# Patient Record
Sex: MALE | Race: WHITE | NOT HISPANIC OR LATINO | Employment: OTHER | URBAN - METROPOLITAN AREA
[De-identification: names, ages, dates, MRNs, and addresses within clinical notes are randomized per-mention and may not be internally consistent; named-entity substitution may affect disease eponyms.]

---

## 2019-01-11 ENCOUNTER — OFFICE VISIT (OUTPATIENT)
Dept: URGENT CARE | Facility: CLINIC | Age: 64
End: 2019-01-11
Payer: COMMERCIAL

## 2019-01-11 VITALS
HEART RATE: 78 BPM | TEMPERATURE: 97.8 F | HEIGHT: 70 IN | SYSTOLIC BLOOD PRESSURE: 132 MMHG | DIASTOLIC BLOOD PRESSURE: 70 MMHG | BODY MASS INDEX: 30.06 KG/M2 | OXYGEN SATURATION: 99 % | WEIGHT: 210 LBS | RESPIRATION RATE: 16 BRPM

## 2019-01-11 DIAGNOSIS — J01.90 ACUTE SINUSITIS, RECURRENCE NOT SPECIFIED, UNSPECIFIED LOCATION: Primary | ICD-10-CM

## 2019-01-11 PROCEDURE — 99283 EMERGENCY DEPT VISIT LOW MDM: CPT | Performed by: PHYSICIAN ASSISTANT

## 2019-01-11 PROCEDURE — G0382 LEV 3 HOSP TYPE B ED VISIT: HCPCS | Performed by: PHYSICIAN ASSISTANT

## 2019-01-11 RX ORDER — FLUTICASONE PROPIONATE 50 MCG
2 SPRAY, SUSPENSION (ML) NASAL DAILY
Refills: 1 | Status: ON HOLD | COMMUNITY
Start: 2018-12-17

## 2019-01-11 RX ORDER — DOXYCYCLINE 100 MG/1
100 CAPSULE ORAL 2 TIMES DAILY
Qty: 14 CAPSULE | Refills: 0 | Status: SHIPPED | OUTPATIENT
Start: 2019-01-11 | End: 2019-01-18

## 2019-01-11 RX ORDER — MIRTAZAPINE 45 MG/1
45 TABLET, FILM COATED ORAL
Refills: 1 | Status: ON HOLD | COMMUNITY
Start: 2018-12-11

## 2019-01-11 RX ORDER — FOLIC ACID 1 MG/1
2000 TABLET ORAL DAILY
Refills: 6 | Status: ON HOLD | COMMUNITY
Start: 2018-12-17

## 2019-01-11 RX ORDER — LOVASTATIN 20 MG/1
20 TABLET ORAL DAILY
Refills: 3 | Status: ON HOLD | COMMUNITY
Start: 2018-12-17

## 2019-01-11 RX ORDER — LANCETS
EACH MISCELLANEOUS 4 TIMES DAILY
Refills: 1 | Status: ON HOLD | COMMUNITY
Start: 2018-12-22

## 2019-01-11 RX ORDER — BLOOD SUGAR DIAGNOSTIC
STRIP MISCELLANEOUS 4 TIMES DAILY
Refills: 1 | Status: ON HOLD | COMMUNITY
Start: 2018-12-17

## 2019-01-11 RX ORDER — LOSARTAN POTASSIUM AND HYDROCHLOROTHIAZIDE 25; 100 MG/1; MG/1
1 TABLET ORAL DAILY
Refills: 0 | Status: ON HOLD | COMMUNITY
Start: 2018-12-17

## 2019-01-11 RX ORDER — ASPIRIN 81 MG/1
81 TABLET, DELAYED RELEASE ORAL DAILY
Refills: 0 | Status: ON HOLD | COMMUNITY
Start: 2018-12-17

## 2019-01-11 RX ORDER — NIFEDIPINE 90 MG/1
90 TABLET, EXTENDED RELEASE ORAL DAILY
Refills: 3 | Status: ON HOLD | COMMUNITY
Start: 2018-12-17

## 2019-01-11 RX ORDER — GLIPIZIDE 10 MG/1
10 TABLET, FILM COATED, EXTENDED RELEASE ORAL 2 TIMES DAILY
Refills: 3 | Status: ON HOLD | COMMUNITY
Start: 2018-12-17

## 2019-01-11 RX ORDER — DIGOXIN 250 UG/1
TABLET ORAL
Refills: 0 | Status: ON HOLD | COMMUNITY
Start: 2018-12-17 | End: 2022-08-09

## 2019-01-11 RX ORDER — KETOCONAZOLE 20 MG/ML
SHAMPOO TOPICAL DAILY
Refills: 6 | Status: ON HOLD | COMMUNITY
Start: 2018-10-10

## 2019-01-11 RX ORDER — PROPRANOLOL HYDROCHLORIDE 160 MG/1
160 CAPSULE, EXTENDED RELEASE ORAL DAILY
Refills: 3 | Status: ON HOLD | COMMUNITY
Start: 2018-12-17

## 2019-01-11 RX ORDER — ARIPIPRAZOLE 30 MG/1
30 TABLET ORAL
Refills: 1 | Status: ON HOLD | COMMUNITY
Start: 2018-12-17 | End: 2022-08-09

## 2019-01-11 RX ORDER — CYCLOBENZAPRINE HCL 10 MG
10 TABLET ORAL
Refills: 1 | Status: ON HOLD | COMMUNITY
Start: 2018-11-21

## 2019-01-11 RX ORDER — SYRINGE AND NEEDLE,INSULIN,1ML 31 GX5/16"
SYRINGE, EMPTY DISPOSABLE MISCELLANEOUS 2 TIMES DAILY
Refills: 0 | Status: ON HOLD | COMMUNITY
Start: 2018-12-22

## 2019-01-11 RX ORDER — INSULIN HUMAN 100 [IU]/ML
INJECTION, SUSPENSION SUBCUTANEOUS
Refills: 6 | Status: ON HOLD | COMMUNITY
Start: 2018-12-27 | End: 2022-08-09

## 2019-01-11 RX ORDER — CLONAZEPAM 1 MG/1
TABLET ORAL 2 TIMES DAILY PRN
Refills: 4 | Status: ON HOLD | COMMUNITY
Start: 2018-12-23

## 2019-01-11 RX ORDER — CETIRIZINE HYDROCHLORIDE 10 MG/1
10 TABLET ORAL DAILY
Refills: 2 | Status: ON HOLD | COMMUNITY
Start: 2018-12-17

## 2019-01-11 RX ORDER — IBUPROFEN 400 MG/1
800 TABLET ORAL 3 TIMES DAILY
Refills: 1 | Status: ON HOLD | COMMUNITY
Start: 2018-11-21

## 2019-01-11 NOTE — PROGRESS NOTES
[facility]        NAME: Stephen Nicholson is a 66 y o  male  : 1955    MRN: [id number]  DATE: 2019  TIME: 5:38 PM    Assessment and Plan   Acute sinusitis, recurrence not specified, unspecified location [J01 90]  1  Acute sinusitis, recurrence not specified, unspecified location  doxycycline monohydrate (MONODOX) 100 mg capsule         Patient Instructions     Patient Instructions   Take medication as directed  Follow up with primary doctor in one week if not improved        Proceed to  ER if symptoms worsen  Chief Complaint     Chief Complaint   Patient presents with    Cold Like Symptoms     Began one week ago  body aches, nasal congestion and cough  History of Present Illness       Pt is a type II diabetic with cc of sinus congestion, body aches, cough with green sputum for the past week  He denies any fever or chills, no sob or cp  He has been using diabetic tussin for cough but it is not helping much  Review of Systems   Review of Systems   Constitutional: Negative for chills and fever  HENT: Positive for congestion, postnasal drip and sinus pressure  Respiratory: Positive for cough and shortness of breath  Cardiovascular: Negative  Musculoskeletal: Negative  Neurological: Negative            Current Medications       Current Outpatient Prescriptions:     ACCU-CHEK FABIANO PLUS test strip, 4 (four) times a day, Disp: , Rfl: 1    ACCU-CHEK FASTCLIX LANCETS MISC, 4 (four) times a day, Disp: , Rfl: 1    ADMELOG 100 UNIT/ML injection, inject 25 UNITS SUBCUTANEOUSLY DAILY, Disp: , Rfl: 6    ARIPiprazole (ABILIFY) 30 mg tablet, Take 30 mg by mouth daily at bedtime, Disp: , Rfl: 1    cetirizine (ZyrTEC) 10 mg tablet, Take 10 mg by mouth daily, Disp: , Rfl: 2    clonazePAM (KlonoPIN) 1 mg tablet, TAKE 1 TABLET BY MOUTH IN THE MORNING AND TAKE 2 TABLETS BY MOUTH AT BEDTIME, Disp: , Rfl: 4    cyclobenzaprine (FLEXERIL) 10 mg tablet, Take 10 mg by mouth 3 (three) times a day, Disp: , Rfl: 1    DIGOX 250 MCG tablet, TAKE 1 TABLET BY MOUTH EVERY DAY BUT DO NOT TAKE ON SUNDAY OR WEDNESDAY, Disp: , Rfl: 0    doxycycline monohydrate (MONODOX) 100 mg capsule, Take 1 capsule (100 mg total) by mouth 2 (two) times a day for 7 days, Disp: 14 capsule, Rfl: 0    fluticasone (FLONASE) 50 mcg/act nasal spray, 2 sprays daily As directed, Disp: , Rfl: 1    folic acid (FOLVITE) 1 mg tablet, Take 2,000 mcg by mouth daily, Disp: , Rfl: 6    glipiZIDE (GLUCOTROL XL) 10 mg 24 hr tablet, Take 10 mg by mouth 2 (two) times a day, Disp: , Rfl: 3    GLOBAL INJECT EASE INSULIN SYR 31G X 5/16" 1 ML MISC, 2 (two) times a day, Disp: , Rfl: 0    GNP ASPIRIN LOW DOSE 81 MG EC tablet, Take 81 mg by mouth daily, Disp: , Rfl: 0    HUMULIN N 100 UNIT/ML subcutaneous injection, INJECT 40 UNITS IN THE MORNING AND 6 UNITS in THE IN THE EVENING AS DIRECTED, Disp: , Rfl: 6    ibuprofen (MOTRIN) 400 mg tablet, Take 800 mg by mouth 3 (three) times a day, Disp: , Rfl: 1    ketoconazole (NIZORAL) 2 % shampoo, daily Use as directed, Disp: , Rfl: 6    losartan-hydrochlorothiazide (HYZAAR) 100-25 MG per tablet, Take 1 tablet by mouth daily, Disp: , Rfl: 0    lovastatin (MEVACOR) 20 mg tablet, Take 20 mg by mouth daily, Disp: , Rfl: 3    metFORMIN (GLUCOPHAGE) 1000 MG tablet, , Disp: , Rfl:     mirtazapine (REMERON) 45 MG tablet, Take 45 mg by mouth daily at bedtime, Disp: , Rfl: 1    NIFEdipine (PROCARDIA XL) 90 mg 24 hr tablet, Take 90 mg by mouth daily, Disp: , Rfl: 3    propranolol (INDERAL LA) 160 mg, Take 160 mg by mouth daily, Disp: , Rfl: 3    Current Allergies     Allergies as of 01/11/2019 - never reviewed   Allergen Reaction Noted    Molds & smuts  01/11/2019            The following portions of the patient's history were reviewed and updated as appropriate: allergies, current medications, past family history, past medical history, past social history, past surgical history and problem list      Past Medical History:   Diagnosis Date    Diabetes mellitus ([facility] [address] )     Hypertension     Mitral valve prolapse        No past surgical history on file  No family history on file  Medications have been verified  Objective   /70   Pulse 78   Temp 97 8 °F (36 6 °C)   Resp 16   Ht 5' 10" (1 778 m)   Wt 95 3 kg (210 lb)   SpO2 99%   BMI 30 13 kg/m²        Physical Exam     Physical Exam   Constitutional: He appears well-developed  No distress  HENT:   Right Ear: Tympanic membrane, external ear and ear canal normal    Left Ear: Tympanic membrane, external ear and ear canal normal    Nose: Mucosal edema and rhinorrhea present  Right sinus exhibits no maxillary sinus tenderness and no frontal sinus tenderness  Left sinus exhibits no maxillary sinus tenderness and no frontal sinus tenderness  Mouth/Throat: Mucous membranes are normal  No oropharyngeal exudate, posterior oropharyngeal edema or posterior oropharyngeal erythema  Post nasal drip   Neck: Normal range of motion  Cardiovascular: Normal rate and regular rhythm  Pulmonary/Chest: Effort normal and breath sounds normal    Lymphadenopathy:     He has no cervical adenopathy  Skin: Skin is warm and dry  Nursing note and vitals reviewed

## 2019-11-05 ENCOUNTER — HOSPITAL ENCOUNTER (EMERGENCY)
Facility: HOSPITAL | Age: 64
Discharge: HOME | End: 2019-11-05
Attending: EMERGENCY MEDICINE | Admitting: EMERGENCY MEDICINE

## 2019-11-05 VITALS
HEIGHT: 69 IN | OXYGEN SATURATION: 97 % | WEIGHT: 238 LBS | DIASTOLIC BLOOD PRESSURE: 81 MMHG | HEART RATE: 94 BPM | BODY MASS INDEX: 35.25 KG/M2 | RESPIRATION RATE: 18 BRPM | TEMPERATURE: 98.5 F | SYSTOLIC BLOOD PRESSURE: 163 MMHG

## 2019-11-05 DIAGNOSIS — S61.219A FINGER LACERATION: Primary | ICD-10-CM

## 2019-11-05 PROCEDURE — 99282 EMERGENCY DEPT VISIT SF MDM: CPT

## 2019-11-05 PROCEDURE — 12002 RPR S/N/AX/GEN/TRNK2.6-7.5CM: CPT | Performed by: EMERGENCY MEDICINE

## 2019-11-05 PROCEDURE — 99282 EMERGENCY DEPT VISIT SF MDM: CPT | Performed by: EMERGENCY MEDICINE

## 2019-11-05 RX ORDER — LIDOCAINE HYDROCHLORIDE 10 MG/ML
5 INJECTION, SOLUTION EPIDURAL; INFILTRATION; INTRACAUDAL; PERINEURAL ONCE
Status: COMPLETED | OUTPATIENT
Start: 2019-11-05 | End: 2019-11-05

## 2019-11-05 RX ORDER — GINSENG 100 MG
1 CAPSULE ORAL ONCE
Status: COMPLETED | OUTPATIENT
Start: 2019-11-05 | End: 2019-11-05

## 2019-11-05 RX ADMIN — LIDOCAINE HYDROCHLORIDE 5 ML: 10 INJECTION, SOLUTION EPIDURAL; INFILTRATION; INTRACAUDAL; PERINEURAL at 10:36

## 2019-11-05 RX ADMIN — BACITRACIN 1 SMALL APPLICATION: 500 OINTMENT TOPICAL at 10:37

## 2019-11-05 NOTE — ED PROVIDER NOTES
History  Chief Complaint   Patient presents with    Laceration     Pt presents to ED c/o laceration to left middle finger  Rates pain 2/10     This is a 73-year-old male who fell off a 1 ft step stool this morning and injured his left 3rd finger on broken glass last tetanus is up-to-date no other areas of injury  He has a 3 cm laceration to the palmar aspect of the left 3rd finger      History provided by:  Patient  Injury   Location:  Left 3rd finger  Quality:  Laceration  Severity:  Moderate  Onset quality:  Sudden  Timing:  Constant  Progression:  Unchanged  Chronicity:  New  Context:  Left 3rd finger laceration on broken glass      Prior to Admission Medications   Prescriptions Last Dose Informant Patient Reported? Taking?    ACCU-CHEK FABIANO PLUS test strip   Yes No   Si (four) times a day   ACCU-CHEK FASTCLIX LANCETS MISC   Yes No   Si (four) times a day   ADMELOG 100 UNIT/ML injection   Yes No   Sig: inject 25 UNITS SUBCUTANEOUSLY DAILY   ARIPiprazole (ABILIFY) 30 mg tablet   Yes No   Sig: Take 30 mg by mouth daily at bedtime   DIGOX 250 MCG tablet   Yes No   Sig: TAKE 1 TABLET BY MOUTH EVERY DAY BUT DO NOT TAKE ON  OR WEDNESDAY   GLOBAL INJECT EASE INSULIN SYR 31G X 5/16" 1 ML MISC   Yes No   Si (two) times a day   GNP ASPIRIN LOW DOSE 81 MG EC tablet   Yes No   Sig: Take 81 mg by mouth daily   HUMULIN N 100 UNIT/ML subcutaneous injection   Yes No   Sig: INJECT 40 UNITS IN THE MORNING AND 6 UNITS in THE IN THE EVENING AS DIRECTED   NIFEdipine (PROCARDIA XL) 90 mg 24 hr tablet   Yes No   Sig: Take 90 mg by mouth daily   cetirizine (ZyrTEC) 10 mg tablet   Yes No   Sig: Take 10 mg by mouth daily   clonazePAM (KlonoPIN) 1 mg tablet   Yes No   Sig: TAKE 1 TABLET BY MOUTH IN THE MORNING AND TAKE 2 TABLETS BY MOUTH AT BEDTIME   cyclobenzaprine (FLEXERIL) 10 mg tablet   Yes No   Sig: Take 10 mg by mouth 3 (three) times a day   fluticasone (FLONASE) 50 mcg/act nasal spray   Yes No   Si sprays daily As directed   folic acid (FOLVITE) 1 mg tablet   Yes No   Sig: Take 2,000 mcg by mouth daily   glipiZIDE (GLUCOTROL XL) 10 mg 24 hr tablet   Yes No   Sig: Take 10 mg by mouth 2 (two) times a day   ibuprofen (MOTRIN) 400 mg tablet   Yes No   Sig: Take 800 mg by mouth 3 (three) times a day   ketoconazole (NIZORAL) 2 % shampoo   Yes No   Sig: daily Use as directed   losartan-hydrochlorothiazide (HYZAAR) 100-25 MG per tablet   Yes No   Sig: Take 1 tablet by mouth daily   lovastatin (MEVACOR) 20 mg tablet   Yes No   Sig: Take 20 mg by mouth daily   metFORMIN (GLUCOPHAGE) 1000 MG tablet   Yes No   mirtazapine (REMERON) 45 MG tablet   Yes No   Sig: Take 45 mg by mouth daily at bedtime   propranolol (INDERAL LA) 160 mg   Yes No   Sig: Take 160 mg by mouth daily      Facility-Administered Medications: None       Past Medical History:   Diagnosis Date    [facility]     Hypertension     Mitral valve prolapse        History reviewed  No pertinent surgical history  History reviewed  No pertinent family history  I have reviewed and agree with the history as documented  Social History     Tobacco Use    Smoking status: Never Smoker    Smokeless tobacco: Never Used   Substance Use Topics    Alcohol use: Yes     Comment: social    Drug use: Never        Review of Systems   Skin: Positive for wound  All other systems reviewed and are negative  Physical Exam  Physical Exam   Constitutional: He is oriented to person, place, and time  He appears well-developed and well-nourished  No distress  HENT:   Head: Normocephalic and atraumatic  Nose: Nose normal    Eyes: Pupils are equal, round, and reactive to light  EOM are normal  Right eye exhibits no discharge  Left eye exhibits no discharge  Neck: No JVD present  No tracheal deviation present  Cardiovascular: Normal rate, regular rhythm and intact distal pulses  Exam reveals no gallop and no friction rub  No murmur heard    Pulmonary/Chest: Effort normal and breath sounds normal  No stridor  No respiratory distress  He has no wheezes  Abdominal: Soft  Bowel sounds are normal    Musculoskeletal: He exhibits no edema, tenderness or deformity  Neurological: He is alert and oriented to person, place, and time  No cranial nerve deficit or sensory deficit  He exhibits normal muscle tone  Skin: He is not diaphoretic  3 cm laceration palmar aspect at base of left 3rd finger   Psychiatric: He has a normal mood and affect  His behavior is normal    Nursing note and vitals reviewed        Vital Signs  ED Triage Vitals [11/05/19 0944]   Temperature Pulse Respirations Blood Pressure SpO2   98 5 °F (36 9 °C) 94 18 163/81 97 %      Temp Source Heart Rate Source Patient Position - Orthostatic VS BP Location FiO2 (%)   Temporal Monitor Sitting Right arm --      Pain Score       3           Vitals:    11/05/19 0944   BP: 163/81   Pulse: 94   Patient Position - Orthostatic VS: Sitting         Visual Acuity      ED Medications  Medications   bacitracin topical ointment 1 small application (has no administration in time range)   lidocaine (PF) (XYLOCAINE-MPF) 1 % injection 5 mL (5 mL Infiltration Given 11/5/19 1036)       Diagnostic Studies  Results Reviewed     None                 No orders to display              Procedures  Laceration repair  Date/Time: 11/5/2019 10:35 AM  Performed by: Rigoberto Jain DO  Authorized by: Rigoberto Jain DO   Body area: upper extremity  Location details: left long finger  Laceration length: 3 cm  Anesthesia: local infiltration    Anesthesia:  Local Anesthetic: lidocaine 1% without epinephrine  Anesthetic total: 4 mL      Procedure Details:  Irrigation solution: saline  Skin closure: 5-0 nylon  Number of sutures: 7  Technique: running  Dressing: antibiotic ointment  Patient tolerance: Patient tolerated the procedure well with no immediate complications             ED Course                               MDM    Disposition  Final diagnoses:   Finger laceration - Left 3rd on broken glass     Time reflects when diagnosis was documented in both MDM as applicable and the Disposition within this note     Time User Action Codes Description Comment    11/5/2019 10:36 AM Tobin Shrestha Add [I38 395O] Finger laceration     11/5/2019 10:36 AM Tobin Shrestha Modify [J57 270N] Finger laceration Left 3rd on broken glass      ED Disposition     ED Disposition Condition Date/Time Comment    Discharge Stable Tue Nov 5, 2019 10:36 AM Susanna Delgado discharge to home/self care  Follow-up Information     Follow up With Specialties Details Why [facility] DO [facility] In 10 days For suture removal [address] [place] [phone]            Patient's Medications   Discharge Prescriptions    No medications on file     No discharge procedures on file      ED Provider  Electronically Signed by           Jayson Suazo DO  11/05/19 1037

## 2020-01-06 ENCOUNTER — HOSPITAL ENCOUNTER (EMERGENCY)
Facility: HOSPITAL | Age: 65
Discharge: HOME | End: 2020-01-06
Attending: EMERGENCY MEDICINE | Admitting: EMERGENCY MEDICINE

## 2020-01-06 VITALS
BODY MASS INDEX: 32.93 KG/M2 | SYSTOLIC BLOOD PRESSURE: 175 MMHG | HEIGHT: 70 IN | WEIGHT: 230 LBS | RESPIRATION RATE: 16 BRPM | HEART RATE: 83 BPM | TEMPERATURE: 97.9 F | DIASTOLIC BLOOD PRESSURE: 83 MMHG | OXYGEN SATURATION: 98 %

## 2020-01-06 DIAGNOSIS — M25.512 CHRONIC PAIN IN LEFT SHOULDER: Primary | ICD-10-CM

## 2020-01-06 DIAGNOSIS — G89.29 CHRONIC PAIN IN LEFT SHOULDER: Primary | ICD-10-CM

## 2020-01-06 PROCEDURE — 99283 EMERGENCY DEPT VISIT LOW MDM: CPT

## 2020-01-06 PROCEDURE — 99284 EMERGENCY DEPT VISIT MOD MDM: CPT | Performed by: PHYSICIAN ASSISTANT

## 2020-01-06 RX ORDER — NAPROXEN 500 MG/1
500 TABLET ORAL 2 TIMES DAILY WITH MEALS
Qty: 20 TABLET | Refills: 0 | Status: ON HOLD | OUTPATIENT
Start: 2020-01-06 | End: 2022-08-09

## 2020-01-06 NOTE — DISCHARGE INSTRUCTIONS
Rest, ice to shoulder  Over the counter lidocaine patches  Take naprosyn twice a day as needed for pain  Call ortho today for a follow up appointment

## 2020-01-06 NOTE — ED PROVIDER NOTES
History  Chief Complaint   Patient presents with    Shoulder Pain     pt presents to ED c/o left shoulder pain that began hurting 4 years ago  The past 4-5 months the pain has gotten worse  States he feels like it pulls out of socket Rates pain 8/10     Patient is a 59 y/o M that presents to the ED with chronic left shoulder pain  He states he had surgery to fix his left rotator cuff muscle many years ago  He does not remember who the surgeon was  He has had pain in his shoulder for many years, but the last 4-5 months the pain worsened and now it feels like it pops out of joint  He is right handed  He denies numbness/tingling  History provided by:  Patient  Shoulder Pain   Location:  Shoulder  Shoulder location:  L shoulder  Injury: no    Pain details:     Quality:  Aching    Radiates to:  Does not radiate    Severity:  Moderate    Onset quality:  Gradual    Duration:  5 months    Timing:  Constant    Progression:  Worsening  Handedness:  Right-handed  Foreign body present:  No foreign bodies  Prior injury to area:  Yes  Relieved by:  Nothing  Worsened by:  Nothing  Ineffective treatments:  NSAIDs  Associated symptoms: no decreased range of motion, no fever, no numbness, no stiffness and no swelling        Prior to Admission Medications   Prescriptions Last Dose Informant Patient Reported? Taking?    ACCU-CHEK FABIANO PLUS test strip   Yes No   Si (four) times a day   ACCU-CHEK FASTCLIX LANCETS MISC   Yes No   Si (four) times a day   ADMELOG 100 UNIT/ML injection   Yes No   Sig: inject 25 UNITS SUBCUTANEOUSLY DAILY   ARIPiprazole (ABILIFY) 30 mg tablet   Yes No   Sig: Take 30 mg by mouth daily at bedtime   DIGOX 250 MCG tablet   Yes No   Sig: TAKE 1 TABLET BY MOUTH EVERY DAY BUT DO NOT TAKE ON  OR WEDNESDAY   GLOBAL INJECT EASE INSULIN SYR 31G X 5/16" 1 ML MISC   Yes No   Si (two) times a day   GNP ASPIRIN LOW DOSE 81 MG EC tablet   Yes No   Sig: Take 81 mg by mouth daily   HUMULIN N 100 UNIT/ML subcutaneous injection   Yes No   Sig: INJECT 40 UNITS IN THE MORNING AND 6 UNITS in THE IN THE EVENING AS DIRECTED   NIFEdipine (PROCARDIA XL) 90 mg 24 hr tablet   Yes No   Sig: Take 90 mg by mouth daily   cetirizine (ZyrTEC) 10 mg tablet   Yes No   Sig: Take 10 mg by mouth daily   clonazePAM (KlonoPIN) 1 mg tablet   Yes No   Sig: TAKE 1 TABLET BY MOUTH IN THE MORNING AND TAKE 2 TABLETS BY MOUTH AT BEDTIME   cyclobenzaprine (FLEXERIL) 10 mg tablet   Yes No   Sig: Take 10 mg by mouth 3 (three) times a day   fluticasone (FLONASE) 50 mcg/act nasal spray   Yes No   Si sprays daily As directed   folic acid (FOLVITE) 1 mg tablet   Yes No   Sig: Take 2,000 mcg by mouth daily   glipiZIDE (GLUCOTROL XL) 10 mg 24 hr tablet   Yes No   Sig: Take 10 mg by mouth 2 (two) times a day   ibuprofen (MOTRIN) 400 mg tablet   Yes No   Sig: Take 800 mg by mouth 3 (three) times a day   ketoconazole (NIZORAL) 2 % shampoo   Yes No   Sig: daily Use as directed   losartan-hydrochlorothiazide (HYZAAR) 100-25 MG per tablet   Yes No   Sig: Take 1 tablet by mouth daily   lovastatin (MEVACOR) 20 mg tablet   Yes No   Sig: Take 20 mg by mouth daily   metFORMIN (GLUCOPHAGE) 1000 MG tablet   Yes No   mirtazapine (REMERON) 45 MG tablet   Yes No   Sig: Take 45 mg by mouth daily at bedtime   propranolol (INDERAL LA) 160 mg   Yes No   Sig: Take 160 mg by mouth daily      Facility-Administered Medications: None       Past Medical History:   Diagnosis Date    [facility] ([facility] [address] )     Hypertension     Mitral valve prolapse        History reviewed  No pertinent surgical history  History reviewed  No pertinent family history  I have reviewed and agree with the history as documented  Social History     Tobacco Use    Smoking status: Never Smoker    Smokeless tobacco: Never Used   Substance Use Topics    Alcohol use: Yes     Comment: social    Drug use: Never        Review of Systems   Constitutional: Negative for chills and fever  HENT: Negative  Respiratory: Negative for cough and shortness of breath  Cardiovascular: Negative for chest pain and leg swelling  Gastrointestinal: Negative for abdominal pain, diarrhea, nausea and vomiting  Musculoskeletal: Negative for stiffness  Left shoulder pain  Skin: Negative for color change and rash  Neurological: Negative for dizziness, weakness and numbness  All other systems reviewed and are negative  Physical Exam  Physical Exam   Constitutional: He is oriented to person, place, and time  He appears well-developed and well-nourished  He is cooperative  He does not appear ill  No distress  HENT:   Head: Normocephalic and atraumatic  Nose: Nose normal    Mouth/Throat: Oropharynx is clear and moist and mucous membranes are normal    Eyes: Conjunctivae are normal    Neck: Normal range of motion  Muscular tenderness (tenderness over the trapezius) present  No spinous process tenderness present  Cardiovascular: Normal rate, regular rhythm and normal heart sounds  Pulses:       Radial pulses are 2+ on the left side  Pulmonary/Chest: Effort normal and breath sounds normal    Musculoskeletal:        Left shoulder: He exhibits tenderness  He exhibits normal range of motion, no bony tenderness, no swelling, no effusion, no deformity, no laceration, normal pulse and normal strength  Left elbow: Normal    Neurological: He is alert and oriented to person, place, and time  He has normal strength  No sensory deficit  Gait normal    Skin: Skin is warm and dry  No bruising and no rash noted  He is not diaphoretic  No erythema  No pallor  Psychiatric: He has a normal mood and affect  His speech is normal  Cognition and memory are normal    Nursing note and vitals reviewed        Vital Signs  ED Triage Vitals   Temperature Pulse Respirations Blood Pressure SpO2   01/06/20 1018 01/06/20 1018 01/06/20 1018 01/06/20 1020 01/06/20 1018   97 9 °F (36 6 °C) 83 16 (!) 175/83 98 % Temp Source Heart Rate Source Patient Position - Orthostatic VS BP Location FiO2 (%)   01/06/20 1018 01/06/20 1018 01/06/20 1018 01/06/20 1018 --   Temporal Monitor Sitting Right arm       Pain Score       01/06/20 1018       8           Vitals:    01/06/20 1018 01/06/20 1020   BP:  (!) 175/83   Pulse: 83    Patient Position - Orthostatic VS: Sitting          Visual Acuity      ED Medications  Medications - No data to display    Diagnostic Studies  Results Reviewed     None                 No orders to display              Procedures  Procedures         ED Course                               MDM  Number of Diagnoses or Management Options  Chronic pain in left shoulder: established and worsening  Diagnosis management comments: Patient with chronic left shoulder pain, worsening over the past 5 months  No h/o trauma, will hold off on imaging  Patient Progress  Patient progress: stable        Disposition  Final diagnoses:   Chronic pain in left shoulder     Time reflects when diagnosis was documented in both MDM as applicable and the Disposition within this note     Time User Action Codes Description Comment    1/6/2020 12:01 PM Victoria Anna Add [U37 064,  G89 29] Chronic pain in left shoulder       ED Disposition     ED Disposition Condition Date/Time Comment    Discharge Stable Mon Jan 6, 2020 12:01 PM Yusef Carmen discharge to home/self care              Follow-up Information     Follow up With Specialties Details Why Contact Info Additional [phone] [facility] Johny Villalpando Orthopedic Surgery Schedule an appointment as soon as possible for a visit  For recheck [phone] [facility] [phone]  [address] [place] [address] Specialists Johny Villalpando, [address] Johny Villalpando, [place], [address]          Discharge Medication List as of 1/6/2020 12:03 PM      START taking these medications    Details   naproxen (NAPROSYN) 500 mg tablet Take 1 tablet (500 mg total) by mouth 2 (two) times a day with meals, Starting Mon 1/6/2020, Normal         CONTINUE these medications which have NOT CHANGED    Details   ACCU-CHEK FABIANO PLUS test strip 4 (four) times a day, Starting Mon 12/17/2018, Historical Med      ACCU-CHEK FASTCLIX LANCETS MISC 4 (four) times a day, Starting Sat 12/22/2018, Historical Med      ADMELOG 100 UNIT/ML injection inject 25 UNITS SUBCUTANEOUSLY DAILY, Historical Med      ARIPiprazole (ABILIFY) 30 mg tablet Take 30 mg by mouth daily at bedtime, Starting Mon 12/17/2018, Historical Med      cetirizine (ZyrTEC) 10 mg tablet Take 10 mg by mouth daily, Starting Mon 12/17/2018, Historical Med      clonazePAM (KlonoPIN) 1 mg tablet TAKE 1 TABLET BY MOUTH IN THE MORNING AND TAKE 2 TABLETS BY MOUTH AT BEDTIME, Historical Med      cyclobenzaprine (FLEXERIL) 10 mg tablet Take 10 mg by mouth 3 (three) times a day, Starting Wed 11/21/2018, Historical Med      DIGOX 250 MCG tablet TAKE 1 TABLET BY MOUTH EVERY DAY BUT DO NOT TAKE ON SUNDAY OR WEDNESDAY, Historical Med      fluticasone (FLONASE) 50 mcg/act nasal spray 2 sprays daily As directed, Starting Mon 12/17/2018, Historical Med      folic acid (FOLVITE) 1 mg tablet Take 2,000 mcg by mouth daily, Starting Mon 12/17/2018, Historical Med      glipiZIDE (GLUCOTROL XL) 10 mg 24 hr tablet Take 10 mg by mouth 2 (two) times a day, Starting Mon 12/17/2018, Historical Med      GLOBAL INJECT EASE INSULIN SYR 31G X 5/16" 1 ML MISC 2 (two) times a day, Starting Sat 12/22/2018, Historical Med      GNP ASPIRIN LOW DOSE 81 MG EC tablet Take 81 mg by mouth daily, Starting Mon 12/17/2018, Historical Med      HUMULIN N 100 UNIT/ML subcutaneous injection INJECT 40 UNITS IN THE MORNING AND 6 UNITS in THE IN THE EVENING AS DIRECTED, Historical Med      ibuprofen (MOTRIN) 400 mg tablet Take 800 mg by mouth 3 (three) times a day, Starting Wed 11/21/2018, Historical Med      ketoconazole (NIZORAL) 2 % shampoo daily Use as directed, Starting Wed 10/10/2018, Historical Med      losartan-hydrochlorothiazide (HYZAAR) 100-25 MG per tablet Take 1 tablet by mouth daily, Starting Mon 12/17/2018, Historical Med      lovastatin (MEVACOR) 20 mg tablet Take 20 mg by mouth daily, Starting Mon 12/17/2018, Historical Med      metFORMIN (GLUCOPHAGE) 1000 MG tablet Starting Wed 1/2/2019, Historical Med      mirtazapine (REMERON) 45 MG tablet Take 45 mg by mouth daily at bedtime, Starting Tue 12/11/2018, Historical Med      NIFEdipine (PROCARDIA XL) 90 mg 24 hr tablet Take 90 mg by mouth daily, Starting Mon 12/17/2018, Historical Med      propranolol (INDERAL LA) 160 mg Take 160 mg by mouth daily, Starting Mon 12/17/2018, Historical Med           No discharge procedures on file      ED Provider  Electronically Signed by           Brianna Mann PA-C  01/06/20 1102

## 2020-01-06 NOTE — ED NOTES
Pt instructed to go back to waiting room, if symptoms worsen to let registration know so they can contact me  Pt verbalized understanding        Stacy Schafer RN  01/06/20 1024

## 2020-04-25 ENCOUNTER — APPOINTMENT (EMERGENCY)
Dept: CT IMAGING | Facility: HOSPITAL | Age: 65
End: 2020-04-25
Payer: COMMERCIAL

## 2020-04-25 ENCOUNTER — HOSPITAL ENCOUNTER (EMERGENCY)
Facility: HOSPITAL | Age: 65
Discharge: HOME | End: 2020-04-25
Attending: EMERGENCY MEDICINE | Admitting: EMERGENCY MEDICINE
Payer: COMMERCIAL

## 2020-04-25 VITALS
BODY MASS INDEX: 34.07 KG/M2 | HEART RATE: 89 BPM | OXYGEN SATURATION: 99 % | HEIGHT: 70 IN | DIASTOLIC BLOOD PRESSURE: 78 MMHG | TEMPERATURE: 98.4 F | WEIGHT: 238 LBS | RESPIRATION RATE: 16 BRPM | SYSTOLIC BLOOD PRESSURE: 145 MMHG

## 2020-04-25 DIAGNOSIS — E04.1 THYROID NODULE: ICD-10-CM
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
## 2020-04-25 DIAGNOSIS — S01.01XA SCALP LACERATION, INITIAL ENCOUNTER: ICD-10-CM

## 2020-04-25 LAB
ANION GAP SERPL CALCULATED.3IONS-SCNC: 13 MMOL/L (ref 4–13)
ATRIAL RATE: 93 BPM
BASOPHILS # BLD AUTO: 0.07 THOUSANDS/ΜL (ref 0–0.1)
BASOPHILS NFR BLD AUTO: 1 % (ref 0–1)
BUN SERPL-MCNC: 19 MG/DL (ref 5–25)
CALCIUM SERPL-MCNC: 8.9 MG/DL (ref 8.3–10.1)
CHLORIDE SERPL-SCNC: 95 MMOL/L (ref 100–108)
CO2 SERPL-SCNC: 26 MMOL/L (ref 21–32)
CREAT SERPL-MCNC: 1.31 MG/DL (ref 0.6–1.3)
EOSINOPHIL # BLD AUTO: 0.34 THOUSAND/ΜL (ref 0–0.61)
EOSINOPHIL NFR BLD AUTO: 3 % (ref 0–6)
ERYTHROCYTE [DISTWIDTH] IN BLOOD BY AUTOMATED COUNT: 13.2 % (ref 11.6–15.1)
ETHANOL SERPL-MCNC: <3 MG/DL (ref 0–3)
GFR SERPL CREATININE-BSD FRML MDRD: 58 ML/MIN/1.73SQ M
GLUCOSE SERPL-MCNC: 320 MG/DL (ref 65–140)
HCT VFR BLD AUTO: 43.4 % (ref 36.5–49.3)
HGB BLD-MCNC: 14.2 G/DL (ref 12–17)
IMM GRANULOCYTES # BLD AUTO: 0.03 THOUSAND/UL (ref 0–0.2)
IMM GRANULOCYTES NFR BLD AUTO: 0 % (ref 0–2)
LYMPHOCYTES # BLD AUTO: 2.74 THOUSANDS/ΜL (ref 0.6–4.47)
LYMPHOCYTES NFR BLD AUTO: 23 % (ref 14–44)
MCH RBC QN AUTO: 28.6 PG (ref 26.8–34.3)
MCHC RBC AUTO-ENTMCNC: 32.7 G/DL (ref 31.4–37.4)
MCV RBC AUTO: 88 FL (ref 82–98)
MONOCYTES # BLD AUTO: 0.83 THOUSAND/ΜL (ref 0.17–1.22)
MONOCYTES NFR BLD AUTO: 7 % (ref 4–12)
NEUTROPHILS # BLD AUTO: 7.88 THOUSANDS/ΜL (ref 1.85–7.62)
NEUTS SEG NFR BLD AUTO: 66 % (ref 43–75)
NRBC BLD AUTO-RTO: 0 /100 WBCS
P AXIS: 47 DEGREES
PLATELET # BLD AUTO: 266 THOUSANDS/UL (ref 149–390)
PMV BLD AUTO: 11.2 FL (ref 8.9–12.7)
POTASSIUM SERPL-SCNC: 3.4 MMOL/L (ref 3.5–5.3)
PR INTERVAL: 144 MS
QRS AXIS: 61 DEGREES
QRSD INTERVAL: 90 MS
QT INTERVAL: 348 MS
QTC INTERVAL: 432 MS
RBC # BLD AUTO: 4.96 MILLION/UL (ref 3.88–5.62)
SODIUM SERPL-SCNC: 134 MMOL/L (ref 136–145)
T WAVE AXIS: 56 DEGREES
TROPONIN I SERPL-MCNC: <0.02 NG/ML
VENTRICULAR RATE: 93 BPM
WBC # BLD AUTO: 11.89 THOUSAND/UL (ref 4.31–10.16)

## 2020-04-25 PROCEDURE — 99284 EMERGENCY DEPT VISIT MOD MDM: CPT | Performed by: PHYSICIAN ASSISTANT

## 2020-04-25 PROCEDURE — 93005 ELECTROCARDIOGRAM TRACING: CPT

## 2020-04-25 PROCEDURE — 84484 ASSAY OF TROPONIN QUANT: CPT | Performed by: PHYSICIAN ASSISTANT

## 2020-04-25 PROCEDURE — 93010 ELECTROCARDIOGRAM REPORT: CPT | Performed by: INTERNAL MEDICINE

## 2020-04-25 PROCEDURE — 36415 COLL VENOUS BLD VENIPUNCTURE: CPT | Performed by: PHYSICIAN ASSISTANT

## 2020-04-25 PROCEDURE — 99285 EMERGENCY DEPT VISIT HI MDM: CPT

## 2020-04-25 PROCEDURE — 80320 DRUG SCREEN QUANTALCOHOLS: CPT | Performed by: PHYSICIAN ASSISTANT

## 2020-04-25 PROCEDURE — 71260 CT THORAX DX C+: CPT

## 2020-04-25 PROCEDURE — 96365 THER/PROPH/DIAG IV INF INIT: CPT

## 2020-04-25 PROCEDURE — 70450 CT HEAD/BRAIN W/O DYE: CPT

## 2020-04-25 PROCEDURE — 80048 BASIC METABOLIC PNL TOTAL CA: CPT | Performed by: PHYSICIAN ASSISTANT

## 2020-04-25 PROCEDURE — 72125 CT NECK SPINE W/O DYE: CPT

## 2020-04-25 PROCEDURE — 85025 COMPLETE CBC W/AUTO DIFF WBC: CPT | Performed by: PHYSICIAN ASSISTANT

## 2020-04-25 PROCEDURE — 74177 CT ABD & PELVIS W/CONTRAST: CPT

## 2020-04-25 RX ORDER — DULOXETIN HYDROCHLORIDE 20 MG/1
20 CAPSULE, DELAYED RELEASE ORAL DAILY
Status: ON HOLD | COMMUNITY
End: 2022-08-09

## 2020-04-25 RX ADMIN — IOHEXOL 100 ML: 350 INJECTION, SOLUTION INTRAVENOUS at 10:45

## 2020-04-25 RX ADMIN — SODIUM CHLORIDE 1000 ML: 0.9 INJECTION, SOLUTION INTRAVENOUS at 11:01

## 2021-03-10 DIAGNOSIS — Z23 ENCOUNTER FOR IMMUNIZATION: ICD-10-CM

## 2021-03-16 ENCOUNTER — TRANSCRIBE ORDERS (OUTPATIENT)
Dept: ADMINISTRATIVE | Facility: HOSPITAL | Age: 66
End: 2021-03-16

## 2021-03-16 DIAGNOSIS — E04.1 NONTOXIC UNINODULAR GOITER: Primary | ICD-10-CM

## 2021-03-20 ENCOUNTER — HOSPITAL ENCOUNTER (OUTPATIENT)
Dept: ULTRASOUND IMAGING | Facility: HOSPITAL | Age: 66
Discharge: HOME | End: 2021-03-20
Payer: COMMERCIAL

## 2021-03-20 DIAGNOSIS — E04.1 NONTOXIC UNINODULAR GOITER: ICD-10-CM

## 2021-03-20 PROCEDURE — 76536 US EXAM OF HEAD AND NECK: CPT

## 2021-03-26 ENCOUNTER — IMMUNIZATIONS (OUTPATIENT)
Dept: FAMILY MEDICINE CLINIC | Facility: HOSPITAL | Age: 66
End: 2021-03-26

## 2021-03-26 DIAGNOSIS — Z23 ENCOUNTER FOR IMMUNIZATION: Primary | ICD-10-CM

## 2021-03-26 PROCEDURE — 0001A SARS-COV-2 / COVID-19 MRNA VACCINE (PFIZER-BIONTECH) 30 MCG: CPT

## 2021-03-26 PROCEDURE — 91300 SARS-COV-2 / COVID-19 MRNA VACCINE (PFIZER-BIONTECH) 30 MCG: CPT

## 2021-04-16 ENCOUNTER — IMMUNIZATIONS (OUTPATIENT)
Dept: FAMILY MEDICINE CLINIC | Facility: HOSPITAL | Age: 66
End: 2021-04-16

## 2021-04-16 DIAGNOSIS — Z23 ENCOUNTER FOR IMMUNIZATION: Primary | ICD-10-CM

## 2021-04-16 PROCEDURE — 91300 SARS-COV-2 / COVID-19 MRNA VACCINE (PFIZER-BIONTECH) 30 MCG: CPT

## 2021-04-16 PROCEDURE — 0002A SARS-COV-2 / COVID-19 MRNA VACCINE (PFIZER-BIONTECH) 30 MCG: CPT

## 2022-04-26 ENCOUNTER — EVALUATION (OUTPATIENT)
Dept: PHYSICAL THERAPY | Facility: CLINIC | Age: 67
End: 2022-04-26
Payer: COMMERCIAL

## 2022-04-26 DIAGNOSIS — Z96.612 STATUS POST TOTAL REPLACEMENT OF LEFT SHOULDER: ICD-10-CM

## 2022-04-26 DIAGNOSIS — G89.29 CHRONIC LEFT SHOULDER PAIN: Primary | ICD-10-CM

## 2022-04-26 DIAGNOSIS — M25.512 CHRONIC LEFT SHOULDER PAIN: Primary | ICD-10-CM

## 2022-04-26 PROCEDURE — 97110 THERAPEUTIC EXERCISES: CPT | Performed by: PHYSICAL THERAPIST

## 2022-04-26 PROCEDURE — 97161 PT EVAL LOW COMPLEX 20 MIN: CPT | Performed by: PHYSICAL THERAPIST

## 2022-04-26 NOTE — PROGRESS NOTES
PT Evaluation     Today's date: 2022  Patient name: Mag Constantino  : 1955  MRN: [id number]  Referring provider: Rachel Cole MD  Dx:   Encounter Diagnosis     ICD-10-CM    1  Chronic left shoulder pain  M25 512     G89 29    2  Status post total replacement of left shoulder  Z96 612                   Assessment  Assessment details: Mag Constantino is a 61 y o  male presenting to outpatient physical therapy at [place] [address] with complaints of L shoulder pain s/p TSA   They present with decreased range of motion, decreased strength, limited flexibility, poor postural awareness, poor body mechanics, poor balance, decreased tolerance to activity and decreased functional mobility due to Chronic left shoulder pain  (primary encounter diagnosis)  Status post total replacement of left shoulder  Carlyle Butler would benefit from skilled physical therapy to address noted impairments in order to allow for full functional return to work and ADL-related activities  Thank you for the referral!  Impairments: abnormal muscle firing, abnormal or restricted ROM, activity intolerance, impaired balance, impaired physical strength, lacks appropriate home exercise program, pain with function and poor body mechanics  Barriers to therapy: n/a  Understanding of Dx/Px/POC: excellent  Goals  ST   Independent with HEP in 2 weeks  2  Decrease pain by 50% in 3 wks  3   Increase shoulder elevation AROM to 90 degrees in 3 weeks     LT  Achieve FOTO score of 55/100 in 6 weeks   2   Able to demo full L shoulder PROM all planes in 6 weeks  3  Strength = 5/5 L shoulder all planes in 6 weeks    4  Able to achieve full L shoulder AROM all planes in 8 weeks  5  Able to reach a shelf OH with 3# in 8 weeks      Plan  Plan details: RE in 6 weeks    Patient would benefit from: skilled physical therapy  Planned modality interventions: cryotherapy, electrical stimulation/Russian stimulation and thermotherapy: hydrocollator packs  Planned therapy interventions: abdominal trunk stabilization, manual therapy, neuromuscular re-education, therapeutic activities, therapeutic exercise, body mechanics training and home exercise program  Frequency: 2x week  Duration in visits: 12  Duration in weeks: 6  Plan of Care beginning date: 4/26/2022  Plan of Care expiration date: 6/10/2022  Treatment plan discussed with: patient        Subjective Evaluation    History of Present Illness  Mechanism of injury: S/p L total shoulder replacement  DOS: 3/11/22    After surgery, he went to [facility] for rehab for 1 day  He reports overall he is doing alright  Strength is not too bad but motion is limited  He has occasional pain to the anterior shoulder and tricep  Rated 8/10  The R shoulder bothers him more than the L, attributes this to the way he sleeps  He sleeps on his R shoulder in the living room on a hospital bed  He lives at home with his wife  He normally ambulates with a SPC for balance  He does not work, he spends his time doing work around [employer]  His wife assists with dressing, bathing, driving  Patient Goals  Patient goals for therapy: decreased edema, decreased pain, improved balance, increased motion, return to sport/leisure activities, return to work, independence with ADLs/IADLs and increased strength          Objective     Postural Observations    Additional Postural Observation Details  L shoulder slight elevation    Tenderness     Additional Tenderness Details  TTP L shoulder anterior joint line, AC joint, posterior joint line, triceps, UT, supraspin, teres    Cervical/Thoracic Screen   Cervical range of motion within normal limits  Thoracic range of motion within normal limits    Active Range of Motion   Left Shoulder   Flexion: 57 degrees with pain  Extension: 55 degrees WFL  Abduction: 53 degrees with pain  External rotation 0°: 70 degrees with pain  Internal rotation 0°: Left shoulder active internal rotation at 0 degrees: to body  WFL    Right Shoulder   Flexion: 130 degrees WFL  Extension: 70 degrees WFL  Abduction: 110 degrees WFL  External rotation BTH: T2 WFL  Internal rotation BTB: T12 WFL    Passive Range of Motion   Left Shoulder   Flexion: 95 degrees with pain  Abduction: 85 degrees with pain  External rotation 45°: 48 degrees with pain  Internal rotation 45°: 70 degrees with pain    Strength/Myotome Testing     Left Shoulder     Planes of Motion   Flexion: 4-   Extension: 4+   Abduction: 4   Adduction: 4+   External rotation at 0°: 4-   Internal rotation at 0°: 4     Isolated Muscles   Biceps: 5   Triceps: 5     Right Shoulder   Normal muscle strength             Precautions: s/p L TSA  Other factors: FALLS RISK  Pt goals:  EPOC: 6/10/22  F/u with referring:      HEP:  Access Code: [email or web address]  URL: [email or web address]  Date: 04/26/2022  Prepared by: Nighat Ricketts    Exercises  · Supine Shoulder External Rotation with Dowel - 20 reps  · Supine Shoulder Flexion Extension AAROM with Dowel - 20 reps  · Standing Shoulder Abduction AAROM with Dowel - 20 reps            Manuals 4/26            L shoulder PROM                                                    Neuro Re-Ed             scap retraction             Prone squeeze                                                                              Ther Ex             pendulums             pulleys             Dowel sh' flex supine x10            Dowel sh' ER supine x10            Dowel sh' abd Stand x10            Sh' iso flex/ext, abd/add, er/ir             arom sh' flex             arom sh' ext             arom sh' ER/IR             arom sh' abd                                                    Ther Activity                                       Gait Training                                       Modalities

## 2022-04-26 NOTE — LETTER
2022    Hamilton Brown MD  [address] [place] [address]  [address] [facility] [address]    Patient: Mag Constantino   YOB: 1955   Date of Visit: 2022     Encounter Diagnosis     ICD-10-CM    1  Chronic left shoulder pain  M25 512     G89 29    2  Status post total replacement of left shoulder  W10 869        Dear Dr Rhett Spring:    Thank you for your recent referral of Mag Constantion  Please review the attached evaluation summary from Juan's recent visit  Please verify that you agree with the plan of care by signing the attached order  If you have any questions or concerns, please do not hesitate to call  I sincerely appreciate the opportunity to share in the care of one of your patients and hope to have another opportunity to work with you in the near future  Sincerely,    Liyah Dorsey, PT      Referring Provider:      I certify that I have read the below Plan of Care and certify the need for these services furnished under this plan of treatment while under my care  Hamilton Brown MD  [address] [place] [address]  [address] [facility] [address]  Via Fax: [phone]          PT Evaluation     Today's date: 2022  Patient name: Mag Constantino  : 1955  MRN: [id number]  Referring provider: Rachel Cole MD  Dx:   Encounter Diagnosis     ICD-10-CM    1  Chronic left shoulder pain  M25 512     G89 29    2   Status post total replacement of left shoulder  Z96 612                   Assessment  Assessment details: Mag Constantino is a 61 y o  male presenting to outpatient physical therapy at [place] [address] with complaints of L shoulder pain s/p TSA   They present with decreased range of motion, decreased strength, limited flexibility, poor postural awareness, poor body mechanics, poor balance, decreased tolerance to activity and decreased functional mobility due to Chronic left shoulder pain  (primary encounter diagnosis)  Status post total replacement of left shoulder  Carlyle Butler would benefit from skilled physical therapy to address noted impairments in order to allow for full functional return to work and ADL-related activities  Thank you for the referral!  Impairments: abnormal muscle firing, abnormal or restricted ROM, activity intolerance, impaired balance, impaired physical strength, lacks appropriate home exercise program, pain with function and poor body mechanics  Barriers to therapy: n/a  Understanding of Dx/Px/POC: excellent  Goals  ST   Independent with HEP in 2 weeks  2  Decrease pain by 50% in 3 wks  3   Increase shoulder elevation AROM to 90 degrees in 3 weeks     LT  Achieve FOTO score of 55/100 in 6 weeks   2   Able to demo full L shoulder PROM all planes in 6 weeks  3  Strength = 5/5 L shoulder all planes in 6 weeks    4  Able to achieve full L shoulder AROM all planes in 8 weeks  5  Able to reach a shelf OH with 3# in 8 weeks      Plan  Plan details: RE in 6 weeks  Patient would benefit from: skilled physical therapy  Planned modality interventions: cryotherapy, electrical stimulation/Russian stimulation and thermotherapy: hydrocollator packs  Planned therapy interventions: abdominal trunk stabilization, manual therapy, neuromuscular re-education, therapeutic activities, therapeutic exercise, body mechanics training and home exercise program  Frequency: 2x week  Duration in visits: 12  Duration in weeks: 6  Plan of Care beginning date: 2022  Plan of Care expiration date: 6/10/2022  Treatment plan discussed with: patient        Subjective Evaluation    History of Present Illness  Mechanism of injury: S/p L total shoulder replacement  DOS: 3/11/22    After surgery, he went to [facility] for rehab for 1 day  He reports overall he is doing alright  Strength is not too bad but motion is limited  He has occasional pain to the anterior shoulder and tricep  Rated 8/10  The R shoulder bothers him more than the L, attributes this to the way he sleeps   He sleeps on his R shoulder in the living room on a hospital bed  He lives at home with his wife  He normally ambulates with a SPC for balance  He does not work, he spends his time doing work around [employer]  His wife assists with dressing, bathing, driving  Patient Goals  Patient goals for therapy: decreased edema, decreased pain, improved balance, increased motion, return to sport/leisure activities, return to work, independence with ADLs/IADLs and increased strength          Objective     Postural Observations    Additional Postural Observation Details  L shoulder slight elevation    Tenderness     Additional Tenderness Details  TTP L shoulder anterior joint line, AC joint, posterior joint line, triceps, UT, supraspin, teres    Cervical/Thoracic Screen   Cervical range of motion within normal limits  Thoracic range of motion within normal limits    Active Range of Motion   Left Shoulder   Flexion: 57 degrees with pain  Extension: 55 degrees WFL  Abduction: 53 degrees with pain  External rotation 0°: 70 degrees with pain  Internal rotation 0°: Left shoulder active internal rotation at 0 degrees: to body  WFL    Right Shoulder   Flexion: 130 degrees WFL  Extension: 70 degrees WFL  Abduction: 110 degrees WFL  External rotation BTH: T2 WFL  Internal rotation BTB: T12 WFL    Passive Range of Motion   Left Shoulder   Flexion: 95 degrees with pain  Abduction: 85 degrees with pain  External rotation 45°: 48 degrees with pain  Internal rotation 45°: 70 degrees with pain    Strength/Myotome Testing     Left Shoulder     Planes of Motion   Flexion: 4-   Extension: 4+   Abduction: 4   Adduction: 4+   External rotation at 0°: 4-   Internal rotation at 0°: 4     Isolated Muscles   Biceps: 5   Triceps: 5     Right Shoulder   Normal muscle strength             Precautions: s/p L TSA  Other factors: FALLS RISK  Pt goals:  EPOC: 6/10/22  F/u with referring:      HEP:  Access Code: [email or web address]  URL: [email or web address]  Date: 04/26/2022  Prepared by: Osmany Ashby    Exercises  · Supine Shoulder External Rotation with Dowel - 20 reps  · Supine Shoulder Flexion Extension AAROM with Dowel - 20 reps  · Standing Shoulder Abduction AAROM with Dowel - 20 reps            Manuals 4/26            L shoulder PROM                                                    Neuro Re-Ed             scap retraction             Prone squeeze                                                                              Ther Ex             pendulums             pulleys             Dowel sh' flex supine x10            Dowel sh' ER supine x10            Dowel sh' abd Stand x10            Sh' iso flex/ext, abd/add, er/ir             arom sh' flex             arom sh' ext             arom sh' ER/IR             arom sh' abd                                                    Ther Activity                                       Gait Training                                       Modalities

## 2022-04-28 ENCOUNTER — OFFICE VISIT (OUTPATIENT)
Dept: PHYSICAL THERAPY | Facility: CLINIC | Age: 67
End: 2022-04-28
Payer: COMMERCIAL

## 2022-04-28 DIAGNOSIS — M25.512 CHRONIC LEFT SHOULDER PAIN: Primary | ICD-10-CM

## 2022-04-28 DIAGNOSIS — G89.29 CHRONIC LEFT SHOULDER PAIN: Primary | ICD-10-CM

## 2022-04-28 DIAGNOSIS — Z96.612 STATUS POST TOTAL REPLACEMENT OF LEFT SHOULDER: ICD-10-CM

## 2022-04-28 PROCEDURE — 97110 THERAPEUTIC EXERCISES: CPT | Performed by: PHYSICAL THERAPIST

## 2022-04-28 PROCEDURE — 97140 MANUAL THERAPY 1/> REGIONS: CPT | Performed by: PHYSICAL THERAPIST

## 2022-04-28 PROCEDURE — 97112 NEUROMUSCULAR REEDUCATION: CPT | Performed by: PHYSICAL THERAPIST

## 2022-04-28 NOTE — PROGRESS NOTES
Daily Note     Today's date: 2022  Patient name: Valentino Jerry  : 1955  MRN: [id number]  Referring provider: Arlene Benites MD  Dx:   Encounter Diagnosis     ICD-10-CM    1  Chronic left shoulder pain  M25 512     G89 29    2  Status post total replacement of left shoulder  Z96 612        Start Time: 818  Stop Time: 8939  Total time in clinic (min): 37 minutes    Subjective: Pt mentioned having pain in the front of the shoulder all night  He said that it's stiff this morning  Objective: See treatment diary below      Assessment: Tolerated treatment well  Pulleys and pendulums felt good  Pt had pain during AROM and isometrics  Responded very well to gentle PROM  Post-session pt was sore but felt good  Patient would benefit from continued PT to address ROM and pain  Plan: Continue per plan of care  Add exercises as pt tolerates  Continue with PROM  Precautions: s/p L TSA  Other factors: FALLS RISK  Pt goals:  EPOC: 6/10/22  F/u with referring:      HEP:  Access Code: [email or web address]  URL: [email or web address]  Date: 2022  Prepared by: Katie Thompson    Exercises  · Supine Shoulder External Rotation with Dowel - 20 reps  · Supine Shoulder Flexion Extension AAROM with Dowel - 20 reps  · Standing Shoulder Abduction AAROM with Dowel - 20 reps            Manuals            L shoulder PROM  MB-JR                                                  Neuro Re-Ed             scap retraction  10x           Prone squeeze                                                                              Ther Ex             pendulums  20x           pulleys  2 min           Dowel sh' flex supine x10 Supine  10x           Dowel sh' ER supine x10 Supine  10x           Dowel sh' abd Stand x10 Stand  10x           Sh' iso flex/ext, abd/add, er/ir  5"x10           arom sh' flex  10x           arom sh' ext             arom sh' ER/IR             arom sh' abd  10x Ther Activity                                       Gait Training                                       Modalities             Cp prn  10 min

## 2022-04-29 ENCOUNTER — HOSPITAL ENCOUNTER (EMERGENCY)
Facility: HOSPITAL | Age: 67
Discharge: HOME | End: 2022-04-29
Attending: EMERGENCY MEDICINE
Payer: COMMERCIAL

## 2022-04-29 ENCOUNTER — APPOINTMENT (EMERGENCY)
Dept: CT IMAGING | Facility: HOSPITAL | Age: 67
End: 2022-04-29
Payer: COMMERCIAL

## 2022-04-29 VITALS
OXYGEN SATURATION: 95 % | HEART RATE: 87 BPM | RESPIRATION RATE: 18 BRPM | HEIGHT: 70 IN | SYSTOLIC BLOOD PRESSURE: 169 MMHG | WEIGHT: 214 LBS | TEMPERATURE: 97 F | BODY MASS INDEX: 30.64 KG/M2 | DIASTOLIC BLOOD PRESSURE: 83 MMHG

## 2022-04-29 DIAGNOSIS — M54.9 BACK PAIN: ICD-10-CM

## 2022-04-29 DIAGNOSIS — K80.20 CHOLELITHIASIS: ICD-10-CM

## 2022-04-29 DIAGNOSIS — R11.0 NAUSEA: Primary | ICD-10-CM

## 2022-04-29 DIAGNOSIS — D72.829 LEUKOCYTOSIS: ICD-10-CM

## 2022-04-29 DIAGNOSIS — R10.9 ABDOMINAL PAIN: ICD-10-CM

## 2022-04-29 LAB
ALBUMIN SERPL BCP-MCNC: 4.1 G/DL (ref 3.5–5)
ALP SERPL-CCNC: 131 U/L (ref 46–116)
ALT SERPL W P-5'-P-CCNC: 16 U/L (ref 12–78)
ANION GAP SERPL CALCULATED.3IONS-SCNC: 10 MMOL/L (ref 4–13)
AST SERPL W P-5'-P-CCNC: 19 U/L (ref 5–45)
BACTERIA UR QL AUTO: NORMAL /HPF
BASOPHILS # BLD AUTO: 0.05 THOUSANDS/ΜL (ref 0–0.1)
BASOPHILS NFR BLD AUTO: 0 % (ref 0–1)
BILIRUB SERPL-MCNC: 0.6 MG/DL (ref 0.2–1)
BILIRUB UR QL STRIP: NEGATIVE
BUN SERPL-MCNC: 11 MG/DL (ref 5–25)
CALCIUM SERPL-MCNC: 9.5 MG/DL (ref 8.3–10.1)
CHLORIDE SERPL-SCNC: 94 MMOL/L (ref 100–108)
CLARITY UR: CLEAR
CO2 SERPL-SCNC: 30 MMOL/L (ref 21–32)
COLOR UR: YELLOW
CREAT SERPL-MCNC: 0.93 MG/DL (ref 0.6–1.3)
EOSINOPHIL # BLD AUTO: 0.1 THOUSAND/ΜL (ref 0–0.61)
EOSINOPHIL NFR BLD AUTO: 1 % (ref 0–6)
ERYTHROCYTE [DISTWIDTH] IN BLOOD BY AUTOMATED COUNT: 12.7 % (ref 11.6–15.1)
GFR SERPL CREATININE-BSD FRML MDRD: 86 ML/MIN/1.73SQ M
GLUCOSE SERPL-MCNC: 161 MG/DL (ref 65–140)
GLUCOSE UR STRIP-MCNC: ABNORMAL MG/DL
HCT VFR BLD AUTO: 46.7 % (ref 36.5–49.3)
HGB BLD-MCNC: 14.5 G/DL (ref 12–17)
HGB UR QL STRIP.AUTO: NEGATIVE
IMM GRANULOCYTES # BLD AUTO: 0.04 THOUSAND/UL (ref 0–0.2)
IMM GRANULOCYTES NFR BLD AUTO: 0 % (ref 0–2)
KETONES UR STRIP-MCNC: ABNORMAL MG/DL
LEUKOCYTE ESTERASE UR QL STRIP: ABNORMAL
LIPASE SERPL-CCNC: 57 U/L (ref 73–393)
LYMPHOCYTES # BLD AUTO: 1.99 THOUSANDS/ΜL (ref 0.6–4.47)
LYMPHOCYTES NFR BLD AUTO: 13 % (ref 14–44)
MCH RBC QN AUTO: 27.5 PG (ref 26.8–34.3)
MCHC RBC AUTO-ENTMCNC: 31 G/DL (ref 31.4–37.4)
MCV RBC AUTO: 89 FL (ref 82–98)
MONOCYTES # BLD AUTO: 0.73 THOUSAND/ΜL (ref 0.17–1.22)
MONOCYTES NFR BLD AUTO: 5 % (ref 4–12)
NEUTROPHILS # BLD AUTO: 12.68 THOUSANDS/ΜL (ref 1.85–7.62)
NEUTS SEG NFR BLD AUTO: 81 % (ref 43–75)
NITRITE UR QL STRIP: NEGATIVE
NON-SQ EPI CELLS URNS QL MICRO: NORMAL /HPF
NRBC BLD AUTO-RTO: 0 /100 WBCS
PH UR STRIP.AUTO: 7.5 [PH]
PLATELET # BLD AUTO: 464 THOUSANDS/UL (ref 149–390)
PMV BLD AUTO: 9.8 FL (ref 8.9–12.7)
POTASSIUM SERPL-SCNC: 3.7 MMOL/L (ref 3.5–5.3)
PROT SERPL-MCNC: 8.5 G/DL (ref 6.4–8.2)
PROT UR STRIP-MCNC: ABNORMAL MG/DL
RBC # BLD AUTO: 5.27 MILLION/UL (ref 3.88–5.62)
RBC #/AREA URNS AUTO: NORMAL /HPF
SODIUM SERPL-SCNC: 134 MMOL/L (ref 136–145)
SP GR UR STRIP.AUTO: 1.01 (ref 1–1.03)
UROBILINOGEN UR QL STRIP.AUTO: 0.2 E.U./DL
WBC # BLD AUTO: 15.59 THOUSAND/UL (ref 4.31–10.16)
WBC #/AREA URNS AUTO: NORMAL /HPF

## 2022-04-29 PROCEDURE — 96374 THER/PROPH/DIAG INJ IV PUSH: CPT

## 2022-04-29 PROCEDURE — 36415 COLL VENOUS BLD VENIPUNCTURE: CPT | Performed by: EMERGENCY MEDICINE

## 2022-04-29 PROCEDURE — 80053 COMPREHEN METABOLIC PANEL: CPT | Performed by: EMERGENCY MEDICINE

## 2022-04-29 PROCEDURE — G1004 CDSM NDSC: HCPCS

## 2022-04-29 PROCEDURE — 74177 CT ABD & PELVIS W/CONTRAST: CPT

## 2022-04-29 PROCEDURE — 99284 EMERGENCY DEPT VISIT MOD MDM: CPT | Performed by: EMERGENCY MEDICINE

## 2022-04-29 PROCEDURE — 96361 HYDRATE IV INFUSION ADD-ON: CPT

## 2022-04-29 PROCEDURE — 83690 ASSAY OF LIPASE: CPT | Performed by: EMERGENCY MEDICINE

## 2022-04-29 PROCEDURE — 81001 URINALYSIS AUTO W/SCOPE: CPT | Performed by: EMERGENCY MEDICINE

## 2022-04-29 PROCEDURE — 99284 EMERGENCY DEPT VISIT MOD MDM: CPT

## 2022-04-29 PROCEDURE — 85025 COMPLETE CBC W/AUTO DIFF WBC: CPT | Performed by: EMERGENCY MEDICINE

## 2022-04-29 PROCEDURE — 96376 TX/PRO/DX INJ SAME DRUG ADON: CPT

## 2022-04-29 RX ORDER — ONDANSETRON 2 MG/ML
4 INJECTION INTRAMUSCULAR; INTRAVENOUS ONCE
Status: COMPLETED | OUTPATIENT
Start: 2022-04-29 | End: 2022-04-29

## 2022-04-29 RX ORDER — MAGNESIUM HYDROXIDE/ALUMINUM HYDROXICE/SIMETHICONE 120; 1200; 1200 MG/30ML; MG/30ML; MG/30ML
30 SUSPENSION ORAL ONCE
Status: COMPLETED | OUTPATIENT
Start: 2022-04-29 | End: 2022-04-29

## 2022-04-29 RX ORDER — ONDANSETRON 4 MG/1
4 TABLET, FILM COATED ORAL EVERY 6 HOURS PRN
Qty: 12 TABLET | Refills: 0 | Status: ON HOLD | OUTPATIENT
Start: 2022-04-29 | End: 2022-08-09

## 2022-04-29 RX ADMIN — IOHEXOL 100 ML: 350 INJECTION, SOLUTION INTRAVENOUS at 20:00

## 2022-04-29 RX ADMIN — ALUMINUM HYDROXIDE, MAGNESIUM HYDROXIDE, AND SIMETHICONE 30 ML: 200; 200; 20 SUSPENSION ORAL at 18:46

## 2022-04-29 RX ADMIN — ONDANSETRON 4 MG: 2 INJECTION INTRAMUSCULAR; INTRAVENOUS at 18:46

## 2022-04-29 RX ADMIN — SODIUM CHLORIDE 500 ML: 0.9 INJECTION, SOLUTION INTRAVENOUS at 18:45

## 2022-04-29 RX ADMIN — ONDANSETRON 4 MG: 2 INJECTION INTRAMUSCULAR; INTRAVENOUS at 21:15

## 2022-04-29 NOTE — ED NOTES
Family at bedside       Jose G Hernandez, [facility]  04/29/22 3316

## 2022-04-29 NOTE — ED PROVIDER NOTES
History  Chief Complaint   Patient presents with    Back Pain     Chronic lower back pain  Has appt with pain management  this coming week  Unable to tolerate the pain today, worse than normal  Pain radiates around to b/l abdomen   Nausea     Nausea, decreased appetite since yesterday  Unable to tolerate any food/drink  Denies V/D     Patient is a 61year old male with a past medical history significant for chronic back pain following with pain management, HTN, DM, psychiatric disorder who presents with abdominal pain and nausea  Patient reports that within the last 2 days he has developed decreased apetite, nausea, and abdominal pain that he describes as a generalized cramping and discomfort, mild to moderate in intensity, radiating throughout the abdomen, constant  Also complains of chronic back pain that is slightly worse than his baseline secondary to having recent shoulder surgery and having to sleep in a awkward position  It is not associated with any numbness, tingling, weakness, loss of bowel or bladder control  Prior to Admission Medications   Prescriptions Last Dose Informant Patient Reported? Taking?    ACCU-CHEK FABIANO PLUS test strip   Yes No   Si (four) times a day   ACCU-CHEK FASTCLIX LANCETS MISC   Yes No   Si (four) times a day   ADMELOG 100 UNIT/ML injection   Yes No   Sig: inject 25 UNITS SUBCUTANEOUSLY DAILY   ARIPiprazole (ABILIFY) 30 mg tablet   Yes No   Sig: Take 30 mg by mouth daily at bedtime   DIGOX 250 MCG tablet   Yes No   Sig: TAKE 1 TABLET BY MOUTH EVERY DAY BUT DO NOT TAKE ON  OR WEDNESDAY   DULoxetine (CYMBALTA) 20 mg capsule   Yes No   Sig: Take 20 mg by mouth daily   GLOBAL INJECT EASE INSULIN SYR 31G X 5/16" 1 ML MISC   Yes No   Si (two) times a day   GNP ASPIRIN LOW DOSE 81 MG EC tablet   Yes No   Sig: Take 81 mg by mouth daily   HUMULIN N 100 UNIT/ML subcutaneous injection   Yes No   Sig: INJECT 40 UNITS IN THE MORNING AND 6 UNITS in THE IN THE EVENING AS DIRECTED   NIFEdipine (PROCARDIA XL) 90 mg 24 hr tablet   Yes No   Sig: Take 90 mg by mouth daily   cetirizine (ZyrTEC) 10 mg tablet   Yes No   Sig: Take 10 mg by mouth daily   clonazePAM (KlonoPIN) 1 mg tablet   Yes No   Sig: TAKE 1 TABLET BY MOUTH IN THE MORNING AND TAKE 2 TABLETS BY MOUTH AT BEDTIME   cyclobenzaprine (FLEXERIL) 10 mg tablet   Yes No   Sig: Take 10 mg by mouth 3 (three) times a day   fluticasone (FLONASE) 50 mcg/act nasal spray   Yes No   Si sprays daily As directed   folic acid (FOLVITE) 1 mg tablet   Yes No   Sig: Take 2,000 mcg by mouth daily   glipiZIDE (GLUCOTROL XL) 10 mg 24 hr tablet   Yes No   Sig: Take 10 mg by mouth 2 (two) times a day   ibuprofen (MOTRIN) 400 mg tablet   Yes No   Sig: Take 800 mg by mouth 3 (three) times a day   ketoconazole (NIZORAL) 2 % shampoo   Yes No   Sig: daily Use as directed   losartan-hydrochlorothiazide (HYZAAR) 100-25 MG per tablet   Yes No   Sig: Take 1 tablet by mouth daily   lovastatin (MEVACOR) 20 mg tablet   Yes No   Sig: Take 20 mg by mouth daily   metFORMIN (GLUCOPHAGE) 1000 MG tablet   Yes No   mirtazapine (REMERON) 45 MG tablet   Yes No   Sig: Take 45 mg by mouth daily at bedtime   naproxen (NAPROSYN) 500 mg tablet   No No   Sig: Take 1 tablet (500 mg total) by mouth 2 (two) times a day with meals   propranolol (INDERAL LA) 160 mg   Yes No   Sig: Take 160 mg by mouth daily      Facility-Administered Medications: None       Past Medical History:   Diagnosis Date    Diabetes mellitus ([facility] [address] )     Hypertension     Mitral valve prolapse        Past Surgical History:   Procedure Laterality Date    JOINT REPLACEMENT Left 2022    Left TSA    THYROID SURGERY      remove cancer       History reviewed  No pertinent family history  I have reviewed and agree with the history as documented      E-Cigarette/Vaping     E-Cigarette/Vaping Substances     Social History     Tobacco Use    Smoking status: Former Smoker     Packs/day: 0 25  Smokeless tobacco: Never Used    Tobacco comment: quit 1981   Substance Use Topics    Alcohol use: Not Currently     Comment: has not drank since january 2022    Drug use: Never       Review of Systems   Constitutional: Positive for appetite change  Negative for chills and fever  HENT: Negative for congestion and rhinorrhea  Eyes: Negative for photophobia and visual disturbance  Respiratory: Negative for cough and shortness of breath  Cardiovascular: Negative for chest pain and palpitations  Gastrointestinal: Positive for abdominal pain and nausea  Negative for abdominal distention, blood in stool, constipation, diarrhea and vomiting  Genitourinary: Negative for decreased urine volume, dysuria, flank pain, hematuria and testicular pain  Musculoskeletal: Positive for back pain (chronic)  Negative for gait problem and neck pain  Skin: Negative for color change and pallor  Neurological: Negative for dizziness, weakness, light-headedness, numbness and headaches  Physical Exam  Physical Exam  Vitals and nursing note reviewed  Constitutional:       General: He is not in acute distress  Appearance: Normal appearance  He is not ill-appearing, toxic-appearing or diaphoretic  HENT:      Head: Normocephalic and atraumatic  Mouth/Throat:      Mouth: Mucous membranes are moist    Eyes:      Conjunctiva/sclera: Conjunctivae normal       Pupils: Pupils are equal, round, and reactive to light  Cardiovascular:      Rate and Rhythm: Normal rate and regular rhythm  Pulses: Normal pulses  Heart sounds: Normal heart sounds  No murmur heard  Pulmonary:      Effort: Pulmonary effort is normal  No respiratory distress  Breath sounds: Normal breath sounds  No stridor  No wheezing, rhonchi or rales  Chest:      Chest wall: No tenderness  Abdominal:      General: Bowel sounds are normal  There is no distension  Palpations: Abdomen is soft  There is no mass  Tenderness: There is generalized abdominal tenderness  There is no right CVA tenderness, left CVA tenderness, guarding or rebound  Negative signs include Pham's sign, Rovsing's sign, McBurney's sign and psoas sign  Hernia: No hernia is present  Musculoskeletal:      Cervical back: Neck supple  Lumbar back: Tenderness present  No swelling, edema, deformity, signs of trauma, lacerations, spasms or bony tenderness  Normal range of motion  Negative right straight leg raise test and negative left straight leg raise test  No scoliosis  Right lower leg: No edema  Left lower leg: No edema  Skin:     General: Skin is warm and dry  Neurological:      General: No focal deficit present  Mental Status: He is alert and oriented to person, place, and time  Mental status is at baseline  Sensory: Sensation is intact  Motor: Motor function is intact  Gait: Gait is intact        Comments: Strength is 5 out of 5 in BLLEs  Sensation intact   Ambulated to the ER room without difficulty    Psychiatric:         Mood and Affect: Mood normal          Behavior: Behavior normal          Vital Signs  ED Triage Vitals [04/29/22 1725]   Temperature Pulse Respirations Blood Pressure SpO2   (!) 97 °F (36 1 °C) 94 18 (!) 174/92 95 %      Temp Source Heart Rate Source Patient Position - Orthostatic VS BP Location FiO2 (%)   Temporal Monitor Sitting Left arm --      Pain Score       10 - Worst Possible Pain           Vitals:    04/29/22 1725 04/29/22 1845 04/29/22 1900   BP: (!) 174/92 (!) 180/87 (!) 181/87   Pulse: 94 94 94   Patient Position - Orthostatic VS: Sitting Sitting          Visual Acuity      ED Medications  Medications   aluminum-magnesium hydroxide-simethicone (MYLANTA) oral suspension 30 mL (30 mL Oral Given 4/29/22 1846)   ondansetron (ZOFRAN) injection 4 mg (4 mg Intravenous Given 4/29/22 1846)   sodium chloride 0 9 % bolus 500 mL (500 mL Intravenous New Bag 4/29/22 1845)   iohexol (OMNIPAQUE) 350 MG/ML injection (SINGLE-DOSE) 100 mL (100 mL Intravenous Given 4/29/22 2000)       Diagnostic Studies  Results Reviewed     Procedure Component Value Units Date/Time    Urine Microscopic [[id number]]  (Normal) Collected: 04/29/22 1833    Lab Status: Final result Specimen: Urine, Clean Catch Updated: 04/29/22 1912     RBC, UA 0-1 /hpf      WBC, UA 1-2 /hpf      Epithelial Cells Occasional /hpf      Bacteria, UA Occasional /hpf     Comprehensive metabolic panel [[id number]]  (Abnormal) Collected: 04/29/22 1829    Lab Status: Final result Specimen: Blood from Arm, Right Updated: 04/29/22 1903     Sodium 134 mmol/L      Potassium 3 7 mmol/L      Chloride 94 mmol/L      CO2 30 mmol/L      ANION GAP 10 mmol/L      BUN 11 mg/dL      Creatinine 0 93 mg/dL      Glucose 161 mg/dL      Calcium 9 5 mg/dL      AST 19 U/L      ALT 16 U/L      Alkaline Phosphatase 131 U/L      Total Protein 8 5 g/dL      Albumin 4 1 g/dL      Total Bilirubin 0 60 mg/dL      eGFR 86 ml/min/1 73sq m     Narrative:      [place] guidelines for Chronic Kidney Disease (CKD):     Stage 1 with normal or high GFR (GFR > 90 mL/min/1 73 square meters)    Stage 2 Mild CKD (GFR = 60-89 mL/min/1 73 square meters)    Stage 3A Moderate CKD (GFR = 45-59 mL/min/1 73 square meters)    Stage 3B Moderate CKD (GFR = 30-44 mL/min/1 73 square meters)    Stage 4 Severe CKD (GFR = 15-29 mL/min/1 73 square meters)    Stage 5 End Stage CKD (GFR <15 mL/min/1 73 square meters)  Note: GFR calculation is accurate only with a steady state creatinine    Lipase [[id number]]  (Abnormal) Collected: 04/29/22 1829    Lab Status: Final result Specimen: Blood from Arm, Right Updated: 04/29/22 1903     Lipase 57 u/L     UA w Reflex to Microscopic w Reflex to Culture [[id number]]  (Abnormal) Collected: 04/29/22 1833    Lab Status: Final result Specimen: Urine, Clean Catch Updated: 04/29/22 1840     Color, UA Yellow     Clarity, UA Clear Specific Gravity, UA 1 015     pH, UA 7 5     Leukocytes, UA Elevated glucose may cause decreased leukocyte values  See urine microscopic for [facility] result/     Nitrite, UA Negative     Protein, UA Trace mg/dl      Glucose, UA >=1000 (1%) mg/dl      Ketones, UA 15 (1+) mg/dl      Urobilinogen, UA 0 2 E U /dl      Bilirubin, UA Negative     Blood, UA Negative    CBC and differential [[id number]]  (Abnormal) Collected: 04/29/22 1829    Lab Status: Final result Specimen: Blood from Arm, Right Updated: 04/29/22 1838     WBC 15 59 Thousand/uL      RBC 5 27 Million/uL      Hemoglobin 14 5 g/dL      Hematocrit 46 7 %      MCV 89 fL      MCH 27 5 pg      MCHC 31 0 g/dL      RDW 12 7 %      MPV 9 8 fL      Platelets 490 Thousands/uL      nRBC 0 /100 WBCs      Neutrophils Relative 81 %      Immat GRANS % 0 %      Lymphocytes Relative 13 %      Monocytes Relative 5 %      Eosinophils Relative 1 %      Basophils Relative 0 %      Neutrophils Absolute 12 68 Thousands/µL      Immature Grans Absolute 0 04 Thousand/uL      Lymphocytes Absolute 1 99 Thousands/µL      Monocytes Absolute 0 73 Thousand/µL      Eosinophils Absolute 0 10 Thousand/µL      Basophils Absolute 0 05 Thousands/µL                  CT abdomen pelvis with contrast   Final Result by Susanna Melgar MD (04/29 2040)      Progression of degenerative changes of the lumbar spine now including L1-2 and L2-3  Consider follow-up MRI if clinically indicated  Cholelithiasis  Workstation performed: [id number]                    Procedures  Procedures         ED Course  ED Course as of 04/29/22 2109 Fri Apr 29, 2022 2106 Discussed results of labs and imaging with patient and his significant other  Discharge instructions given to patient that included "Your CT scan today showed: Progression of degenerative changes of the lumbar spine now including L1-2 and L2-3   Consider follow-up MRI if clinically indicated  Cholelithiasis      Please follow-up with your pain specialist as scheduled  Return to the emergency department for the following, but not limited to numbness, tingling, weakness, fever, loss of bowel or bladder control, persistent vomiting, blood in the stools "                               SBIRT 20yo+      Most Recent Value   SBIRT (24 yo +)    In order to provide better care to our patients, we are screening all of our patients for alcohol and drug use  Would it be okay to ask you these screening questions? Yes Filed at: 04/29/2022 1816   Initial Alcohol Screen: US AUDIT-C     1  How often do you have a drink containing alcohol? 0 Filed at: 04/29/2022 1816   2  How many drinks containing alcohol do you have on a typical day you are drinking? 0 Filed at: 04/29/2022 1816   3a  Male UNDER 65: How often do you have five or more drinks on one occasion? 0 Filed at: 04/29/2022 1816   3b  FEMALE Any Age, or MALE 65+: How often do you have 4 or more drinks on one occassion? 0 Filed at: 04/29/2022 1816   Audit-C Score 0 Filed at: 04/29/2022 1816   JARON: How many times in the past year have you    Used an illegal drug or used a prescription medication for non-medical reasons? Never Filed at: 04/29/2022 1816                    MDM  Number of Diagnoses or Management Options  Diagnosis management comments: Assessment and Plan:   #1 nausea, decreased apetite, abdominal pain  Differential includes gastritis versus pancreatitis versus colitis  Will treat symptomatically  Reassess  Check labs and imaging  #2 chronic back pain with normal neuro exam  Patient has follow up appointment scheduled with pain specialist for next week         Disposition  Final diagnoses:   Nausea   Abdominal pain   Back pain   Cholelithiasis   Leukocytosis     Time reflects when diagnosis was documented in both MDM as applicable and the Disposition within this note     Time User Action Codes Description Comment    4/29/2022  8:51 PM Mazin Hernandez [R11 0] Nausea     4/29/2022  8:51 PM Trina Salinaslong [R10 9] Abdominal pain     4/29/2022  8:51 PM Gwenettdaniele Aguiar Add [M54 9] Back pain     4/29/2022  8:53 PM Titadaniele Cosme Add [K80 20] Cholelithiasis     4/29/2022  9:05 PM Han Braswell, [id number] [E24 022] Leukocytosis       ED Disposition     ED Disposition Condition Date/Time Comment    Discharge Stable Fri Apr 29, 2022  8:47 PM Lowell Enciso discharge to home/self care  Follow-up Information     Follow up With Specialties Details Why Contact Info Additional Information    Stephei Hartley DO Family Medicine Schedule an appointment as soon as possible for a visit in 3 days for re-evaluation [address] Emergency Department Emergency Medicine Go to  As needed, If symptoms worsen, for re-evaluation [address] [place],[address] [id number]  [address] [facility] Emergency Department, [address] [facility], [address] [place], [place] [address]lori Apolinar 10    Your pain specialist  Go to  your scheduled appointment            Patient's Medications   Discharge Prescriptions    ONDANSETRON (ZOFRAN) 4 MG TABLET    Take 1 tablet (4 mg total) by mouth every 6 (six) hours as needed for nausea or vomiting       Start Date: 4/29/2022 End Date: --       Order Dose: 4 mg       Quantity: 12 tablet    Refills: 0       No discharge procedures on file      PDMP Review     None          ED Provider  Electronically Signed by           Valeriy Leigh DO  04/29/22 5652

## 2022-04-29 NOTE — ED NOTES
Patient ambulating to bathroom at this time     Kobi Yen, [facility]  04/29/22 2247

## 2022-04-29 NOTE — ED NOTES
Patient returned from bathroom at this time  Steady gait with ambulation       Alesha Navarro, [facility]  04/29/22 4582

## 2022-04-30 NOTE — DISCHARGE INSTRUCTIONS
Your CT scan today showed: Progression of degenerative changes of the lumbar spine now including L1-2 and L2-3  Consider follow-up MRI if clinically indicated  Cholelithiasis  Please follow-up with your pain specialist as scheduled  Return to the emergency department for the following, but not limited to numbness, tingling, weakness, fever, loss of bowel or bladder control, persistent vomiting, blood in the stools

## 2022-05-03 ENCOUNTER — OFFICE VISIT (OUTPATIENT)
Dept: PHYSICAL THERAPY | Facility: CLINIC | Age: 67
End: 2022-05-03
Payer: COMMERCIAL

## 2022-05-03 DIAGNOSIS — Z96.612 STATUS POST TOTAL REPLACEMENT OF LEFT SHOULDER: ICD-10-CM

## 2022-05-03 DIAGNOSIS — G89.29 CHRONIC LEFT SHOULDER PAIN: Primary | ICD-10-CM

## 2022-05-03 DIAGNOSIS — M25.512 CHRONIC LEFT SHOULDER PAIN: Primary | ICD-10-CM

## 2022-05-03 PROCEDURE — 97110 THERAPEUTIC EXERCISES: CPT

## 2022-05-03 PROCEDURE — 97140 MANUAL THERAPY 1/> REGIONS: CPT

## 2022-05-03 NOTE — PROGRESS NOTES
Daily Note     Today's date: 5/3/2022  Patient name: Nehal Gonsalez  : 1955  MRN: [id number]  Referring provider: Kehinde Morfin MD  Dx:   Encounter Diagnosis     ICD-10-CM    1  Chronic left shoulder pain  M25 512     G89 29    2  Status post total replacement of left shoulder  Z96 612                   Subjective: Pt reports his involved shoulder has been feeling pretty good, but uninvolved R shoulder muscles and into neck has been very sore from wearing the sling but has d/c sling now  Objective: See treatment diary below      Assessment: Tolerated treatment well  Showed improved passive and AA ROM today  Performed STM to R UT and cervical to help relieve muscle pain on uninvolved side with positive results  Patient would benefit from continued PT to address ROM and pain  Plan: Continue per plan of care  Add exercises as pt tolerates  Continue with PROM  Precautions: s/p L TSA  Other factors: FALLS RISK  Pt goals:  EPOC: 6/10/22  F/u with referring:      HEP:  Access Code: [email or web address]  URL: [email or web address]  Date: 2022  Prepared by: Nancy Waldron    Exercises  · Supine Shoulder External Rotation with Dowel - 20 reps  · Supine Shoulder Flexion Extension AAROM with Dowel - 20 reps  · Standing Shoulder Abduction AAROM with Dowel - 20 reps            Manuals  5/3          L shoulder PROM  MB-JR WE          R UT and cervical STM   WE                                    Neuro Re-Ed             scap retraction  10x           Prone squeeze                                                                              Ther Ex             pendulums  20x 20x          pulleys  2 min 3 min          Dowel sh' flex supine x10 Supine  10x Supine  10x          Dowel sh' ER supine x10 Supine  10x Supine  10x          Dowel sh' abd Stand x10 Stand  10x Stand  10x          Sh' iso flex/ext, abd/add, er/ir  5"x10 5"x10 ea          arom sh' flex  10x 10x          arom sh' ext arom sh' ER/IR             arom sh' abd  10x x10                                                 Ther Activity                                       Gait Training                                       Modalities             Cp prn  10 min

## 2022-05-05 ENCOUNTER — OFFICE VISIT (OUTPATIENT)
Dept: PHYSICAL THERAPY | Facility: CLINIC | Age: 67
End: 2022-05-05
Payer: COMMERCIAL

## 2022-05-05 DIAGNOSIS — M25.512 CHRONIC LEFT SHOULDER PAIN: Primary | ICD-10-CM

## 2022-05-05 DIAGNOSIS — Z96.612 STATUS POST TOTAL REPLACEMENT OF LEFT SHOULDER: ICD-10-CM

## 2022-05-05 DIAGNOSIS — G89.29 CHRONIC LEFT SHOULDER PAIN: Primary | ICD-10-CM

## 2022-05-05 PROCEDURE — 97140 MANUAL THERAPY 1/> REGIONS: CPT

## 2022-05-05 PROCEDURE — 97110 THERAPEUTIC EXERCISES: CPT

## 2022-05-05 NOTE — PROGRESS NOTES
Daily Note     Today's date: 2022  Patient name: Willem Cote  : 1955  MRN: [id number]  Referring provider: Jorge Rosen MD  Dx:   Encounter Diagnosis     ICD-10-CM    1  Chronic left shoulder pain  M25 512     G89 29    2  Status post total replacement of left shoulder  Z96 612                   Subjective: Pt reports his neck and uninvolved shoulder are feeling better today  Objective: See treatment diary below      Assessment: Tolerated treatment well  Pt continues to demonstrate improved ROM with less pain  He also shows improved tolerance of the TE's with increased AAROM with cane  Less tension noted t/o cervical and UT area from sling  Progressed HEP today and pt shows good understanding  Patient would benefit from continued PT to address ROM and pain  Plan: Continue per plan of care  Add exercises as pt tolerates  Continue with PROM  Precautions: s/p L TSA  Other factors: FALLS RISK  Pt goals:  EPOC: 6/10/22  F/u with referring:      HEP:  Access Code: [email or web address]  URL: [email or web address]  Date: 2022  Prepared by: Orlando Carroll    Exercises  · Supine Shoulder External Rotation with Dowel - 20 reps  · Supine Shoulder Flexion Extension AAROM with Dowel - 20 reps  · Standing Shoulder Abduction AAROM with Dowel - 20 reps            Manuals 4/26 4/27 5/3 5/5         L shoulder PROM  MB-JR WE WE         R UT and cervical STM   WE WE                                   Neuro Re-Ed             scap retraction  10x  x10         Prone squeeze                                                                              Ther Ex             pendulums  20x 20x 20x         pulleys  2 min 3 min 2'/2'         Dowel sh' flex supine x10 Supine  10x Supine  10x Supine  15x         Dowel sh' ER supine x10 Supine  10x Supine  10x Supine  15x         Dowel sh' abd Stand x10 Stand  10x Stand  10x Stand  x15         Sh' iso flex/ext, abd/add, er/ir  5"x10 5"x10 ea 5"x10 ea arom sh' flex  10x 10x x10         arom sh' ext             arom ' ER/IR             arom ' abd  10x x10 x10         Bent over Rows    x15                                   Ther Activity                                       Gait Training                                       Modalities             Cp prn  10 min

## 2022-05-10 ENCOUNTER — OFFICE VISIT (OUTPATIENT)
Dept: PHYSICAL THERAPY | Facility: CLINIC | Age: 67
End: 2022-05-10
Payer: COMMERCIAL

## 2022-05-10 DIAGNOSIS — G89.29 CHRONIC LEFT SHOULDER PAIN: Primary | ICD-10-CM

## 2022-05-10 DIAGNOSIS — M25.512 CHRONIC LEFT SHOULDER PAIN: Primary | ICD-10-CM

## 2022-05-10 DIAGNOSIS — Z96.612 STATUS POST TOTAL REPLACEMENT OF LEFT SHOULDER: ICD-10-CM

## 2022-05-10 PROCEDURE — 97140 MANUAL THERAPY 1/> REGIONS: CPT | Performed by: PHYSICAL THERAPIST

## 2022-05-10 NOTE — PROGRESS NOTES
Daily Note     Today's date: 5/10/2022  Patient name: Lowell Enciso  : 1955  MRN: [id number]  Referring provider: Glenna Monreal MD  Dx:   Encounter Diagnosis     ICD-10-CM    1  Chronic left shoulder pain  M25 512     G89 29    2  Status post total replacement of left shoulder  Z96 612                   Subjective: Notes still getting pain in his shoulder at the front and back  Late arrival after getting pulled over  Objective: See treatment diary below      Assessment: Brief session due to tardiness from getting pulled over  Noting anterior displacement of L shoulder and weakness with shoulder ER with isometric resistance  Pulling discomfort with ER but no major restriction  Good resistance with isometric ER/IR at 50% effort  Able to perform SL ER without difficulty and less pain with cues for scap retraction as well  Plan: Continue per plan of care  Precautions: s/p L TSA  Other factors: FALLS RISK  Pt goals:  EPOC: 6/10/22  F/u with referring:      HEP:  Access Code: [email or web address]  URL: [email or web address]  Date: 2022  Prepared by: Mazin Hernández    Exercises  · Supine Shoulder External Rotation with Dowel - 20 reps  · Supine Shoulder Flexion Extension AAROM with Dowel - 20 reps  · Standing Shoulder Abduction AAROM with Dowel - 20 reps            Manuals 4/26 4/27 5/3 5/5 5/10        L shoulder PROM  MB-JR WE WE PF        R UT and cervical STM   WE WE         Rhythmic stab     PF                     Neuro Re-Ed             scap retraction  10x  x10 x10        Prone squeeze                                                                              Ther Ex             pendulums  20x 20x 20x         pulleys  2 min 3 min 2'/2' NV        Dowel sh' flex supine x10 Supine  10x Supine  10x Supine  15x NBV        Dowel sh' ER supine x10 Supine  10x Supine  10x Supine  15x NV        Dowel sh' abd Stand x10 Stand  10x Stand  10x Stand  x15 NV        Sh' iso flex/ext, abd/add, er/ir  5"x10 5"x10 ea 5"x10 ea Manual 10x5" ea        arom sh' flex  10x 10x x10         arom sh' ext             arom sh' ER/IR             arom sh' abd  10x x10 x10         Bent over Rows    x15         SL ER     20x                     Ther Activity                                       Gait Training                                       Modalities             Cp prn  10 min

## 2022-05-12 ENCOUNTER — OFFICE VISIT (OUTPATIENT)
Dept: PHYSICAL THERAPY | Facility: CLINIC | Age: 67
End: 2022-05-12
Payer: COMMERCIAL

## 2022-05-12 DIAGNOSIS — G89.29 CHRONIC LEFT SHOULDER PAIN: Primary | ICD-10-CM

## 2022-05-12 DIAGNOSIS — M25.512 CHRONIC LEFT SHOULDER PAIN: Primary | ICD-10-CM

## 2022-05-12 DIAGNOSIS — Z96.612 STATUS POST TOTAL REPLACEMENT OF LEFT SHOULDER: ICD-10-CM

## 2022-05-12 PROCEDURE — 97110 THERAPEUTIC EXERCISES: CPT

## 2022-05-12 NOTE — PROGRESS NOTES
Daily Note     Today's date: 2022  Patient name: Vani Ro  : 1955  MRN: [id number]  Referring provider: Heather Worthy MD  Dx:   Encounter Diagnosis     ICD-10-CM    1  Chronic left shoulder pain  M25 512     G89 29    2  Status post total replacement of left shoulder  Z96 612        Start Time: 0740  Stop Time: 08  Total time in clinic (min): 45 minutes    Subjective: Patient states, "My shoulder joint doesn't bother me  It's my muscles that are painful"  Notes decreased pain following therapy  Objective: See treatment diary below    Assessment: Patient able to complete all assigned TE with no increase in pain  PROM demonstrated steady improvements t/o passive stretching  Plan: Continue per plan of care  Precautions: s/p L TSA  Other factors: FALLS RISK  Pt goals:  EPOC: 6/10/22  F/u with referring:    HEP:  Access Code: [email or web address]  URL: [email or web address]  Date: 2022  Prepared by: Bianca Roula    Exercises  · Supine Shoulder External Rotation with Dowel - 20 reps  · Supine Shoulder Flexion Extension AAROM with Dowel - 20 reps  · Standing Shoulder Abduction AAROM with Dowel - 20 reps    Manuals 4/26 4/27 5/3 5/5 5/10 5/12       L shoulder PROM  MB-JR WE WE PF JM       R UT and cervical STM   WE WE         Rhythmic stab     PF                     Neuro Re-Ed             scap retraction  10x  x10 x10 20x       Prone squeeze                                                                              Ther Ex             pendulums  20x 20x 20x         pulleys  2 min 3 min 2'/2' NV 2' flex       Dowel sh' flex supine x10 Supine  10x Supine  10x Supine  15x NBV Supine 20x       Dowel sh' ER supine x10 Supine  10x Supine  10x Supine  15x NV Supine 20x       Dowel sh' abd Stand x10 Stand  10x Stand  10x Stand  x15 NV Stand 20x       Sh' iso flex/ext, abd/add, er/ir  5"x10 5"x10 ea 5"x10 ea Manual 10x5" ea 5"x10 ea       arom sh' flex  10x 10x x10         arom sh' ext arom sh' ER/IR             arom sh' abd  10x x10 x10         Bent over Rows    x15         SL ER     20x 20x                    Ther Activity                                       Gait Training                                       Modalities             Cp prn  10 min

## 2022-05-17 ENCOUNTER — OFFICE VISIT (OUTPATIENT)
Dept: PHYSICAL THERAPY | Facility: CLINIC | Age: 67
End: 2022-05-17
Payer: COMMERCIAL

## 2022-05-17 DIAGNOSIS — Z96.612 STATUS POST TOTAL REPLACEMENT OF LEFT SHOULDER: ICD-10-CM

## 2022-05-17 DIAGNOSIS — M25.512 CHRONIC LEFT SHOULDER PAIN: Primary | ICD-10-CM

## 2022-05-17 DIAGNOSIS — G89.29 CHRONIC LEFT SHOULDER PAIN: Primary | ICD-10-CM

## 2022-05-17 PROCEDURE — 97140 MANUAL THERAPY 1/> REGIONS: CPT | Performed by: PHYSICAL THERAPIST

## 2022-05-17 PROCEDURE — 97110 THERAPEUTIC EXERCISES: CPT | Performed by: PHYSICAL THERAPIST

## 2022-05-17 NOTE — PROGRESS NOTES
Daily Note     Today's date: 2022  Patient name: Vicente Raza  : 1955  MRN: [id number]  Referring provider: James Galvez MD  Dx:   Encounter Diagnosis     ICD-10-CM    1  Chronic left shoulder pain  M25 512     G89 29    2  Status post total replacement of left shoulder  Z96 612                   Subjective: Notes he tripped and fell yesterday from his wife and notes that he scrapped his arm  Objective: See treatment diary below      Assessment: Able to perform isometric IR without pectoral substitution  Able to perform Supine elevation then progression to standing with cane assist without difficulty  Able to perform isometrics without difficulty as well  Will continue with strengthening as able  Plan: Continue per plan of care  Precautions: s/p L TSA  Other factors: FALLS RISK  Pt goals:  EPOC: 6/10/22  F/u with referring:    HEP:  Access Code: [email or web address]  URL: [email or web address]  Date: 2022  Prepared by: Fito Jesus    Exercises  · Supine Shoulder External Rotation with Dowel - 20 reps  · Supine Shoulder Flexion Extension AAROM with Dowel - 20 reps  · Standing Shoulder Abduction AAROM with Dowel - 20 reps    Manuals 4/26 4/27 5/3 5/5 5/10 5/12 5/17      L shoulder PROM  MB-JR WE WE PF JM PF      R UT and cervical STM   WE WE   PF      Rhythmic stab     PF  PF                   Neuro Re-Ed             scap retraction  10x  x10 x10 20x 20x      Prone squeeze                                                                              Ther Ex             pendulums  20x 20x 20x         pulleys  2 min 3 min 2'/2' NV 2' flex 3 min      Dowel sh' flex supine x10 Supine  10x Supine  10x Supine  15x NBV Supine 20x 20x      Dowel sh' ER supine x10 Supine  10x Supine  10x Supine  15x NV Supine 20x supine 20x      Dowel sh' abd Stand x10 Stand  10x Stand  10x Stand  x15 NV Stand 20x 20x      Sh' iso flex/ext, abd/add, er/ir  5"x10 5"x10 ea 5"x10 ea Manual 10x5" ea 5"x10 ea 5x10"      arom sh' flex  10x 10x x10   x10 with cane      arom sh' ext             arom sh' ER/IR             arom sh' abd  10x x10 x10         Bent over Rows    x15         SL ER     20x 20x 20x      SL ABD and H ABD       20x ea      Ther Activity                                       Gait Training                                       Modalities             Cp prn  10 min

## 2022-05-19 ENCOUNTER — EVALUATION (OUTPATIENT)
Dept: PHYSICAL THERAPY | Facility: CLINIC | Age: 67
End: 2022-05-19
Payer: COMMERCIAL

## 2022-05-19 DIAGNOSIS — Z96.612 STATUS POST TOTAL REPLACEMENT OF LEFT SHOULDER: ICD-10-CM

## 2022-05-19 DIAGNOSIS — G89.29 CHRONIC LEFT SHOULDER PAIN: Primary | ICD-10-CM

## 2022-05-19 DIAGNOSIS — M25.512 CHRONIC LEFT SHOULDER PAIN: Primary | ICD-10-CM

## 2022-05-19 PROCEDURE — 97110 THERAPEUTIC EXERCISES: CPT | Performed by: PHYSICAL THERAPIST

## 2022-05-19 PROCEDURE — 97140 MANUAL THERAPY 1/> REGIONS: CPT | Performed by: PHYSICAL THERAPIST

## 2022-05-19 NOTE — PROGRESS NOTES
PT Re-Evaluation     Today's date: 2022  Patient name: Mag Constantino  : 1955  MRN: [id number]  Referring provider: Rachel Cole MD  Dx:   Encounter Diagnosis     ICD-10-CM    1  Chronic left shoulder pain  M25 512     G89 29    2  Status post total replacement of left shoulder  Z96 612                   Assessment  Assessment details: Mag Constantino is a 61 y o  male presenting to outpatient physical therapy at [place] [address] with complaints of L shoulder pain s/p TSA   They present with decreased range of motion, decreased strength, limited flexibility, poor postural awareness, poor body mechanics, poor balance, decreased tolerance to activity and decreased functional mobility due to Chronic left shoulder pain  (primary encounter diagnosis)  Status post total replacement of left shoulder  Carlyle Butler would benefit from skilled physical therapy to address noted impairments in order to allow for full functional return to work and ADL-related activities  Thank you for the referral!    Re-assessment 22:  Franny Sequeira has attended 7 visits since the initiation of PT and reports a 30% GROC  Clinically demonstrates improved L shoulder ROM and strength in all planes yet continues to have limited ability to perform full AROM and continued RTC weakness into flexion and ABD  Franny Sequeira continues to have pain and discomfort with performance with reaching behind and OH as well suggesting that he will continue to benefit from skilled OPPT to address his remaining deficits, achieve established goals and return to PLOF pain-free  Impairments: abnormal muscle firing, abnormal or restricted ROM, activity intolerance, impaired balance, impaired physical strength, lacks appropriate home exercise program, pain with function and poor body mechanics  Barriers to therapy: n/a  Understanding of Dx/Px/POC: excellent  Goals  ST   Independent with HEP in 2 weeks- met  2   Decrease pain by 50% in 3 wks- partially met  3   Increase shoulder elevation AROM to 90 degrees in 3 weeks- partially met     LT  Achieve FOTO score of 55/100 in 6 weeks - not met  2   Able to demo full L shoulder PROM all planes in 6 weeks- partially met  3  Strength = 5/5 L shoulder all planes in 6 weeks- not met    4  Able to achieve full L shoulder AROM all planes in 8 weeks  - not met  5  Able to reach a shelf OH with 3# in 8 weeks- not met      Plan  Plan details: RE in 6 weeks  Patient would benefit from: skilled physical therapy  Planned modality interventions: cryotherapy, electrical stimulation/Russian stimulation and thermotherapy: hydrocollator packs  Planned therapy interventions: abdominal trunk stabilization, manual therapy, neuromuscular re-education, therapeutic activities, therapeutic exercise, body mechanics training and home exercise program  Frequency: 2x week  Duration in visits: 12  Duration in weeks: 6  Plan of Care beginning date: 2022  Plan of Care expiration date: 2022  Treatment plan discussed with: patient        Subjective Evaluation    History of Present Illness  Mechanism of injury: S/p L total shoulder replacement  DOS: 3/11/22    After surgery, he went to [facility] for [facility] for 1 day  He reports overall he is doing alright  Strength is not too bad but motion is limited  He has occasional pain to the anterior shoulder and tricep  Rated 8/10  The R shoulder bothers him more than the L, attributes this to the way he sleeps  He sleeps on his R shoulder in the living room on a hospital bed  He lives at home with his wife  He normally ambulates with a SPC for balance  He does not work, he spends his time doing work around [employer]  His wife assists with dressing, bathing, driving  Re-assessment 22:  Mariella Gupta has attended 7 visits since the initiation of PT and reports a 30% GROC  Reports improved mobility with OH reaching and lifting as well as flexion and ABD mobility    Denies any difficulty with self grooming but still needs some assistance with donning shirts and putting on a jacket  Notes good sleep overall without disturbance  Notes progression with strength isometrically  Wishes to continue with OPPT in order to 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  F/U with MD in   Pain  Current pain ratin  At best pain ratin  At worst pain rating: 10  Location: L shoulder    Patient Goals  Patient goals for therapy: decreased edema, decreased pain, improved balance, increased motion, return to sport/leisure activities, return to work, independence with ADLs/IADLs and increased strength          Objective     Postural Observations    Additional Postural Observation Details  L shoulder slight elevation    Tenderness     Additional Tenderness Details  TTP L shoulder anterior joint line, AC joint, posterior joint line, triceps, UT, supraspin, teres    Cervical/Thoracic Screen   Cervical range of motion within normal limits  Thoracic range of motion within normal limits    Active Range of Motion   Left Shoulder   Flexion: 87 degrees with pain  Extension: 70 degrees WFL  Abduction: 72 degrees with pain  External rotation 0°: 72 degrees with pain  Internal rotation 0°: Left shoulder active internal rotation at 0 degrees: to [facility]  Internal rotation BTB: L2     Right Shoulder   Flexion: 130 degrees WFL  Extension: 70 degrees WFL  Abduction: 110 degrees WFL  External rotation BTH: T2 WFL  Internal rotation BTB: T12 WFL    Passive Range of Motion   Left Shoulder   Flexion: 140 degrees with pain  Abduction: 110 degrees with pain  External rotation 45°: 65 degrees with pain  Internal rotation 45°: 70 degrees     Strength/Myotome Testing     Left Shoulder     Planes of Motion   Flexion: 4   Extension: 5   Abduction: 4   Adduction: 5   External rotation at 0°: 4   Internal rotation at 0°: 4+     Isolated Muscles   Biceps: 5   Triceps: 5     Right Shoulder   Normal muscle strength             Precautions: s/p L TSA  Other factors: FALLS RISK  Pt goals:  EPOC: 6/10/22  F/u with referring:      HEP:  Access Code: [email or web address]  URL: [email or web address]  Date: 04/26/2022  Prepared by: Pelon Lyons    Exercises  · Supine Shoulder External Rotation with Dowel - 20 reps  · Supine Shoulder Flexion Extension AAROM with Dowel - 20 reps  · Standing Shoulder Abduction AAROM with Dowel - 20 reps            Manuals 4/26 4/27 5/3 5/5 5/10 5/12 5/17 5/19     L shoulder PROM  MB-JR WE WE PF JM PF PF     R UT and cervical STM   WE WE   PF PF     Rhythmic stab     PF  PF PF                  Neuro Re-Ed             scap retraction  10x  x10 x10 20x 20x 20x     Prone squeeze                                                                              Ther Ex             pendulums  20x 20x 20x         pulleys  2 min 3 min 2'/2' NV 2' flex 3 min 3 min      Dowel sh' flex supine x10 Supine  10x Supine  10x Supine  15x NBV Supine 20x 20x 20x     Dowel sh' ER supine x10 Supine  10x Supine  10x Supine  15x NV Supine 20x supine 20x      Dowel sh' abd Stand x10 Stand  10x Stand  10x Stand  x15 NV Stand 20x 20x      Sh' iso flex/ext, abd/add, er/ir  5"x10 5"x10 ea 5"x10 ea Manual 10x5" ea 5"x10 ea 5x10" 5x10" ea     arom sh' flex  10x 10x x10   x10 with cane x10 with cane     arom sh' ext             arom sh' ER/IR             arom sh' abd  10x x10 x10         Bent over Rows    x15         SL ER     20x 20x 20x 20x     SL ABD and H ABD       20x ea 20x      Ther Activity                                       Gait Training                                       Modalities             Cp prn  10 min

## 2022-05-19 NOTE — LETTER
May 19, 2022    Iman Arevalo MD  [address] [place] [address]  [address] [address]    Patient: Boom Saldivar   YOB: 1955   Date of Visit: 2022     Encounter Diagnosis     ICD-10-CM    1  Chronic left shoulder pain  M25 512     G89 29    2  Status post total replacement of left shoulder  J64 024        Dear Dr Trey Engel:    Thank you for your recent referral of Boom Saldivar  Please review the attached evaluation summary from Juan's recent visit  Please verify that you agree with the plan of care by signing the attached order  If you have any questions or concerns, please do not hesitate to call  I sincerely appreciate the opportunity to share in the care of one of your patients and hope to have another opportunity to work with you in the near future  Sincerely,    Adán Schneider, PT      Referring Provider:      I certify that I have read the below Plan of Care and certify the need for these services furnished under this plan of treatment while under my care  Iman Arevalo MD  [address] [place] [address]  [address] [address] [address]  Via Fax: [phone]          PT Re-Evaluation     Today's date: 2022  Patient name: Boom Saldivar  : 1955  MRN: [id number]  Referring provider: Eligio Lundborg, MD  Dx:   Encounter Diagnosis     ICD-10-CM    1  Chronic left shoulder pain  M25 512     G89 29    2   Status post total replacement of left shoulder  Z96 612                   Assessment  Assessment details: Boom Saldivar is a 61 y o  male presenting to outpatient physical therapy at [place] [address] with complaints of L shoulder pain s/p TSA   They present with decreased range of motion, decreased strength, limited flexibility, poor postural awareness, poor body mechanics, poor balance, decreased tolerance to activity and decreased functional mobility due to Chronic left shoulder pain  (primary encounter diagnosis)  Status post total replacement of left shoulder  Moo Pichardo would benefit from skilled physical therapy to address noted impairments in order to allow for full functional return to work and ADL-related activities  Thank you for the referral!    Re-assessment 22:  Rochelle Ga has attended 7 visits since the initiation of PT and reports a 30% GROC  Clinically demonstrates improved L shoulder ROM and strength in all planes yet continues to have limited ability to perform full AROM and continued RTC weakness into flexion and ABD  Rochelle Ga continues to have pain and discomfort with performance with reaching behind and OH as well suggesting that he will continue to benefit from skilled OPPT to address his remaining deficits, achieve established goals and return to PLOF pain-free  Impairments: abnormal muscle firing, abnormal or restricted ROM, activity intolerance, impaired balance, impaired physical strength, lacks appropriate home exercise program, pain with function and poor body mechanics  Barriers to therapy: n/a  Understanding of Dx/Px/POC: excellent  Goals  ST   Independent with HEP in 2 weeks- met  2  Decrease pain by 50% in 3 wks- partially met  3   Increase shoulder elevation AROM to 90 degrees in 3 weeks- partially met     LT  Achieve FOTO score of 55/100 in 6 weeks - not met  2   Able to demo full L shoulder PROM all planes in 6 weeks- partially met  3  Strength = 5/5 L shoulder all planes in 6 weeks- not met    4  Able to achieve full L shoulder AROM all planes in 8 weeks  - not met  5  Able to reach a shelf OH with 3# in 8 weeks- not met      Plan  Plan details: RE in 6 weeks    Patient would benefit from: skilled physical therapy  Planned modality interventions: cryotherapy, electrical stimulation/Russian stimulation and thermotherapy: hydrocollator packs  Planned therapy interventions: abdominal trunk stabilization, manual therapy, neuromuscular re-education, therapeutic activities, therapeutic exercise, body mechanics training and home exercise program  Frequency: 2x week  Duration in visits: 12  Duration in weeks: 6  Plan of Care beginning date: 2022  Plan of Care expiration date: 2022  Treatment plan discussed with: patient        Subjective Evaluation    History of Present Illness  Mechanism of injury: S/p L total shoulder replacement  DOS: 3/11/22    After surgery, he went to [facility] for rehab for 1 day  He reports overall he is doing alright  Strength is not too bad but motion is limited  He has occasional pain to the anterior shoulder and tricep  Rated 8/10  The R shoulder bothers him more than the L, attributes this to the way he sleeps  He sleeps on his R shoulder in the living room on a hospital bed  He lives at home with his wife  He normally ambulates with a SPC for balance  He does not work, he spends his time doing work around [employer]  His wife assists with dressing, bathing, driving  Re-assessment 22:  Florinda Muñoz has attended 7 visits since the initiation of PT and reports a 30% GROC  Reports improved mobility with OH reaching and lifting as well as flexion and ABD mobility  Denies any difficulty with self grooming but still needs some assistance with donning shirts and putting on a jacket  Notes good sleep overall without disturbance  Notes progression with strength isometrically  Wishes to continue with OPPT in order to 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  F/U with MD in       Pain  Current pain ratin  At best pain ratin  At worst pain rating: 10  Location: L shoulder    Patient Goals  Patient goals for therapy: decreased edema, decreased pain, improved balance, increased motion, return to sport/leisure activities, return to work, independence with ADLs/IADLs and increased strength          Objective     Postural Observations    Additional Postural Observation Details  L shoulder slight elevation    Tenderness     Additional Tenderness Details  TTP L shoulder anterior joint line, AC joint, posterior joint line, triceps, UT, supraspin, teres    Cervical/Thoracic Screen   Cervical range of motion within normal limits  Thoracic range of motion within normal limits    Active Range of Motion   Left Shoulder   Flexion: 87 degrees with pain  Extension: 70 degrees WFL  Abduction: 72 degrees with pain  External rotation 0°: 72 degrees with pain  Internal rotation 0°: Left shoulder active internal rotation at 0 degrees: to body  [facility]  Internal rotation BTB: L2     Right Shoulder   Flexion: 130 degrees WFL  Extension: 70 degrees WFL  Abduction: 110 degrees WFL  External rotation BTH: T2 WFL  Internal rotation BTB: T12 WFL    Passive Range of Motion   Left Shoulder   Flexion: 140 degrees with pain  Abduction: 110 degrees with pain  External rotation 45°: 65 degrees with pain  Internal rotation 45°: 70 degrees     Strength/Myotome Testing     Left Shoulder     Planes of Motion   Flexion: 4   Extension: 5   Abduction: 4   Adduction: 5   External rotation at 0°: 4   Internal rotation at 0°: 4+     Isolated Muscles   Biceps: 5   Triceps: 5     Right Shoulder   Normal muscle strength             Precautions: s/p L TSA  Other factors: FALLS RISK  Pt goals:  EPOC: 6/10/22  F/u with referring:      HEP:  Access Code: [email or web address]  URL: [email or web address]  Date: 04/26/2022  Prepared by: Elias Alpers    Exercises  · Supine Shoulder External Rotation with Dowel - 20 reps  · Supine Shoulder Flexion Extension AAROM with Dowel - 20 reps  · Standing Shoulder Abduction AAROM with Dowel - 20 reps            Manuals 4/26 4/27 5/3 5/5 5/10 5/12 5/17 5/19     L shoulder PROM  MB-JR WE WE PF JM PF PF     R UT and cervical STM   WE WE   PF PF     Rhythmic stab     PF  PF PF                  Neuro Re-Ed             scap retraction  10x  x10 x10 20x 20x 20x     Prone squeeze                                                                              Ther Ex             pendulums  20x 20x 20x         pulleys  2 min 3 min 2'/2' NV 2' flex 3 min 3 min      Dowel sh' flex supine x10 Supine  10x Supine  10x Supine  15x NBV Supine 20x 20x 20x     Dowel sh' ER supine x10 Supine  10x Supine  10x Supine  15x NV Supine 20x supine 20x      Dowel sh' abd Stand x10 Stand  10x Stand  10x Stand  x15 NV Stand 20x 20x      Sh' iso flex/ext, abd/add, er/ir  5"x10 5"x10 ea 5"x10 ea Manual 10x5" ea 5"x10 ea 5x10" 5x10" ea     arom sh' flex  10x 10x x10   x10 with cane x10 with cane     arom sh' ext             arom sh' ER/IR             arom sh' abd  10x x10 x10         Bent over Rows    x15         SL ER     20x 20x 20x 20x     SL ABD and H ABD       20x ea 20x      Ther Activity                                       Gait Training                                       Modalities             Cp prn  10 min

## 2022-05-24 ENCOUNTER — OFFICE VISIT (OUTPATIENT)
Dept: PHYSICAL THERAPY | Facility: CLINIC | Age: 67
End: 2022-05-24
Payer: COMMERCIAL

## 2022-05-24 DIAGNOSIS — Z96.612 STATUS POST TOTAL REPLACEMENT OF LEFT SHOULDER: ICD-10-CM

## 2022-05-24 DIAGNOSIS — G89.29 CHRONIC LEFT SHOULDER PAIN: Primary | ICD-10-CM

## 2022-05-24 DIAGNOSIS — M25.512 CHRONIC LEFT SHOULDER PAIN: Primary | ICD-10-CM

## 2022-05-24 PROCEDURE — 97140 MANUAL THERAPY 1/> REGIONS: CPT

## 2022-05-24 PROCEDURE — 97110 THERAPEUTIC EXERCISES: CPT

## 2022-05-24 NOTE — PROGRESS NOTES
Daily Note     Today's date: 2022  Patient name: Steffanie Haskins  : 1955  MRN: [id number]  Referring provider: Fracisco Sandhu MD  Dx:   Encounter Diagnosis     ICD-10-CM    1  Chronic left shoulder pain  M25 512     G89 29    2  Status post total replacement of left shoulder  Z96 612                   Subjective: Pt reports that he is having a bad day today noting increased pain in his low back which limited his sleep  Notes that he took pain meds that have not helped along with injections a few weeks ago that only lasted so long  L shoulder pain upon presentation 3/10  Objective: See treatment diary below      Assessment: Tolerated treatment fair  Pt demonstrated a tight end fell in all shoulder direction with minimal improvements made in ROM following manuals  Poor stability was present with rhythmic stab manually  Challenged with AROM above 90 deg in both flex and abd  Patient demonstrated fatigue post treatment, exhibited good technique with therapeutic exercises and would benefit from continued PT      Plan: Continue per plan of care  Precautions: s/p L TSA  Other factors: FALLS RISK  Pt goals:  EPOC: 6/10/22  F/u with referring:      HEP:  Access Code: [email or web address]  URL: [email or web address]  Date: 2022  Prepared by: Arpit Root    Exercises  · Supine Shoulder External Rotation with Dowel - 20 reps  · Supine Shoulder Flexion Extension AAROM with Dowel - 20 reps  · Standing Shoulder Abduction AAROM with Dowel - 20 reps            Manuals  5/3 5/5 5/10 5/12 5/17 5/19 5/24    L shoulder PROM  MB-JR WE WE PF JM PF PF MM    R UT and cervical STM   WE WE   PF PF MM    Rhythmic stab     PF  PF PF MM                 Neuro Re-Ed             scap retraction  10x  x10 x10 20x 20x 20x 20x    Prone squeeze                                                                              Ther Ex             pendulums  20x 20x 20x         pulleys  2 min 3 min 2'/2' NV 2' flex 3 min 3 min  3 min    Dowel sh' flex supine x10 Supine  10x Supine  10x Supine  15x NBV Supine 20x 20x 20x 20x    Dowel sh' ER supine x10 Supine  10x Supine  10x Supine  15x NV Supine 20x supine 20x  supine 20x    Dowel sh' abd Stand x10 Stand  10x Stand  10x Stand  x15 NV Stand 20x 20x      Sh' iso flex/ext, abd/add, er/ir  5"x10 5"x10 ea 5"x10 ea Manual 10x5" ea 5"x10 ea 5x10" 5x10" ea 5x10" ea    arom sh' flex  10x 10x x10   x10 with cane x10 with cane x10 w cane    arom sh' ext             arom sh' ER/IR             arom sh' abd  10x x10 x10         Bent over Rows    x15         SL ER     20x 20x 20x 20x 20x    SL ABD and H ABD       20x ea 20x  20x ea    Ther Activity                                       Gait Training                                       Modalities             Cp prn  10 min

## 2022-05-26 ENCOUNTER — OFFICE VISIT (OUTPATIENT)
Dept: PHYSICAL THERAPY | Facility: CLINIC | Age: 67
End: 2022-05-26
Payer: COMMERCIAL

## 2022-05-26 DIAGNOSIS — G89.29 CHRONIC LEFT SHOULDER PAIN: Primary | ICD-10-CM

## 2022-05-26 DIAGNOSIS — M25.512 CHRONIC LEFT SHOULDER PAIN: Primary | ICD-10-CM

## 2022-05-26 DIAGNOSIS — Z96.612 STATUS POST TOTAL REPLACEMENT OF LEFT SHOULDER: ICD-10-CM

## 2022-05-26 PROCEDURE — 97110 THERAPEUTIC EXERCISES: CPT

## 2022-05-26 PROCEDURE — 97112 NEUROMUSCULAR REEDUCATION: CPT

## 2022-05-26 PROCEDURE — 97140 MANUAL THERAPY 1/> REGIONS: CPT

## 2022-05-26 NOTE — PROGRESS NOTES
Daily Note     Today's date: 2022  Patient name: Nav Reese  : 1955  MRN: [id number]  Referring provider: Lenora Godinez MD  Dx:   Encounter Diagnosis     ICD-10-CM    1  Chronic left shoulder pain  M25 512     G89 29    2  Status post total replacement of left shoulder  Z96 612                   Subjective: Pt states his range of motion has improved  Objective: See treatment diary below      Assessment: Tolerated treatment well with no increase in pain and min to moderate fatigue  Pt was able to add AROM at mirror today with max VC to decrease UT compensation  Pt continuesto progress toward his goals  Patient would benefit from continued PT      Plan: Continue per plan of care  Precautions: s/p L TSA  Other factors: FALLS RISK  Pt goals:  EPOC: 6/10/22  F/u with referring:      HEP:  Access Code: [email or web address]  URL: [email or web address]  Date: 2022  Prepared by: Doug Slack    Exercises  · Supine Shoulder External Rotation with Dowel - 20 reps  · Supine Shoulder Flexion Extension AAROM with Dowel - 20 reps  · Standing Shoulder Abduction AAROM with Dowel - 20 reps            Manuals 55 5/10 5/12 5/17 5/19 5/24 5/26   L shoulder PROM WE PF JM PF PF MM CF   R UT and cervical STM WE   PF PF MM CF   Rhythmic stab  PF  PF PF MM CF             Neuro Re-Ed          scap retraction x10 x10 20x 20x 20x 20x 20x    Prone squeeze                                                            Ther Ex          pendulums 20x         pulleys 2'/2' NV 2' flex 3 min 3 min  3 min 3 min   Dowel sh' flex Supine  15x NBV Supine 20x 20x 20x 20x supine 20x    Dowel sh' ER Supine  15x NV Supine 20x supine 20x  supine 20x supine 20x    Dowel sh' abd Stand  x15 NV Stand 20x 20x      Sh' iso flex/ext, abd/add, er/ir 5"x10 ea Manual 10x5" ea 5"x10 ea 5x10" 5x10" ea 5x10" ea 5 x 10" ea    arom sh' flex x10   x10 with cane x10 with cane x10 w cane x10 @ mirror   arom sh' ext          arom sh' ER/IR arom sh' scap       10x @ mirror    arom sh' abd x10      10x @ mirror   Bent over Rows x15         SL ER  20x 20x 20x 20x 20x 20x    SL ABD and H ABD    20x ea 20x  20x ea 20x ea    Ther Activity                              Gait Training                              Modalities          Cp prn

## 2022-05-31 ENCOUNTER — OFFICE VISIT (OUTPATIENT)
Dept: PHYSICAL THERAPY | Facility: CLINIC | Age: 67
End: 2022-05-31
Payer: COMMERCIAL

## 2022-05-31 DIAGNOSIS — M25.512 CHRONIC LEFT SHOULDER PAIN: Primary | ICD-10-CM

## 2022-05-31 DIAGNOSIS — G89.29 CHRONIC LEFT SHOULDER PAIN: Primary | ICD-10-CM

## 2022-05-31 DIAGNOSIS — Z96.612 STATUS POST TOTAL REPLACEMENT OF LEFT SHOULDER: ICD-10-CM

## 2022-05-31 PROCEDURE — 97140 MANUAL THERAPY 1/> REGIONS: CPT

## 2022-05-31 PROCEDURE — 97112 NEUROMUSCULAR REEDUCATION: CPT

## 2022-05-31 PROCEDURE — 97110 THERAPEUTIC EXERCISES: CPT

## 2022-05-31 NOTE — PROGRESS NOTES
Daily Note     Today's date: 2022  Patient name: Niki Clark  : 1955  MRN: [id number]  Referring provider: Geovanny Rao MD  Dx:   Encounter Diagnosis     ICD-10-CM    1  Chronic left shoulder pain  M25 512     G89 29    2  Status post total replacement of left shoulder  Z96 612                   Subjective: Pt reports he was sore for about 4 days after LV but notes large improvement once soreness resolved  Objective: See treatment diary below      Assessment: Tolerated treatment well  He continues to improve PROM with each visit  Still has some shoulder elevation and compensation with AROM performed in front of mirror for visual assistance  Patient would benefit from continued PT      Plan: Continue per plan of care  Precautions: s/p L TSA  Other factors: FALLS RISK  Pt goals:  EPOC: 6/10/22  F/u with referring:      HEP:  Access Code: [email or web address]  URL: [email or web address]  Date: 2022  Prepared by: Matias Daly    Exercises  · Supine Shoulder External Rotation with Dowel - 20 reps  · Supine Shoulder Flexion Extension AAROM with Dowel - 20 reps  · Standing Shoulder Abduction AAROM with Dowel - 20 reps            Manuals 5/5 5/10 5/12 5/17 5/19 5/24 5/26 5/31   L shoulder PROM WE PF JM PF PF MM CF WE   R UT and cervical STM WE   PF PF MM CF WE   Rhythmic stab  PF  PF PF MM CF WE              Neuro Re-Ed           scap retraction x10 x10 20x 20x 20x 20x 20x  20   Prone squeeze                                                                  Ther Ex           pendulums 20x          pulleys 2'/2' NV 2' flex 3 min 3 min  3 min 3 min 3'/3'   Dowel sh' flex Supine  15x NBV Supine 20x 20x 20x 20x supine 20x  supine 20x    Dowel sh' ER Supine  15x NV Supine 20x supine 20x  supine 20x supine 20x  supine 20x    Dowel sh' abd Stand  x15 NV Stand 20x 20x       Sh' iso flex/ext, abd/add, er/ir 5"x10 ea Manual 10x5" ea 5"x10 ea 5x10" 5x10" ea 5x10" ea 5 x 10" ea  5"x10 ea   arom sh' flex x10   x10 with cane x10 with cane x10 w cane x10 @ mirror 10x @ mirror   arom sh' ext           arom sh' ER/IR           arom sh' scap       10x @ mirror  10x @ mirror   arom sh' abd x10      10x @ mirror 10x @ mirror   Bent over Rows x15          SL ER  20x 20x 20x 20x 20x 20x  20x   SL ABD and H ABD    20x ea 20x  20x ea 20x ea  20x ea   Ther Activity                                 Gait Training                                 Modalities           Cp prn

## 2022-06-02 ENCOUNTER — OFFICE VISIT (OUTPATIENT)
Dept: PHYSICAL THERAPY | Facility: CLINIC | Age: 67
End: 2022-06-02
Payer: COMMERCIAL

## 2022-06-02 DIAGNOSIS — Z96.612 STATUS POST TOTAL REPLACEMENT OF LEFT SHOULDER: ICD-10-CM

## 2022-06-02 DIAGNOSIS — M25.512 CHRONIC LEFT SHOULDER PAIN: Primary | ICD-10-CM

## 2022-06-02 DIAGNOSIS — G89.29 CHRONIC LEFT SHOULDER PAIN: Primary | ICD-10-CM

## 2022-06-02 PROCEDURE — 97140 MANUAL THERAPY 1/> REGIONS: CPT

## 2022-06-02 PROCEDURE — 97112 NEUROMUSCULAR REEDUCATION: CPT

## 2022-06-02 PROCEDURE — 97110 THERAPEUTIC EXERCISES: CPT

## 2022-06-02 NOTE — PROGRESS NOTES
Daily Note     Today's date: 2022  Patient name: Konstantin Sanders  : 1955  MRN: [id number]  Referring provider: Giovanny Snider MD  Dx:   Encounter Diagnosis     ICD-10-CM    1  Chronic left shoulder pain  M25 512     G89 29    2  Status post total replacement of left shoulder  Z96 612                   Subjective: Pt notes good report from MD- follow up in 3 months  He reports continued improvements  Objective: See treatment diary below      Assessment: Tolerated treatment well  Pt continues to show improved strength and PROM  Less shoulder compensation noted with shoulder flexion in front of mirror for visual cues  Patient would benefit from continued PT      Plan: Continue per plan of care  Precautions: s/p L TSA  Other factors: FALLS RISK  Pt goals:  EPOC: 6/10/22  F/u with referring:      HEP:  Access Code: [email or web address]  URL: [email or web address]  Date: 2022  Prepared by: Jono Duobse    Exercises  · Supine Shoulder External Rotation with Dowel - 20 reps  · Supine Shoulder Flexion Extension AAROM with Dowel - 20 reps  · Standing Shoulder Abduction AAROM with Dowel - 20 reps            Manuals 55 5/10 5/12 5/17 5/19 5/24 5/26 5/31 6/2   L shoulder PROM WE PF JM PF PF MM CF WE WE   R UT and cervical STM WE   PF PF MM CF WE WE pec release   Rhythmic stab  PF  PF PF MM CF WE WE               Neuro Re-Ed            scap retraction x10 x10 20x 20x 20x 20x 20x  20    Prone squeeze                                                                        Ther Ex            pendulums 20x           pulleys 2'/2' NV 2' flex 3 min 3 min  3 min 3 min 3'/3' 3'/3'   Dowel sh' flex Supine  15x NBV Supine 20x 20x 20x 20x supine 20x  supine 20x  Supine  2# cuff  x20   Dowel sh' ER Supine  15x NV Supine 20x supine 20x  supine 20x supine 20x  supine 20x  Supine  x20   Dowel sh' abd Stand  x15 NV Stand 20x 20x        Sh' iso flex/ext, abd/add, er/ir 5"x10 ea Manual 10x5" ea 5"x10 ea 5x10" 5x10" ea 5x10" ea 5 x 10" ea  5"x10 ea 5"x10 ea   arom sh' flex x10   x10 with cane x10 with cane x10 w cane x10 @ mirror 10x @ mirror 10x @ mirror   arom sh' ext            arom sh' ER/IR            arom sh' scap       10x @ mirror  10x @ mirror 10x @ mirror   arom sh' abd x10      10x @ mirror 10x @ mirror 10x @ mirror   Bent over Rows x15           SL ER  20x 20x 20x 20x 20x 20x  20x x20   SL ABD and H ABD    20x ea 20x  20x ea 20x ea  20x ea x20 ea   TB Rows         GTB  x15   TB Ext         GTB  x15               Ther Activity                                    Gait Training                                    Modalities            Cp prn

## 2022-06-09 ENCOUNTER — OFFICE VISIT (OUTPATIENT)
Dept: PHYSICAL THERAPY | Facility: CLINIC | Age: 67
End: 2022-06-09
Payer: COMMERCIAL

## 2022-06-09 DIAGNOSIS — M25.512 CHRONIC LEFT SHOULDER PAIN: Primary | ICD-10-CM

## 2022-06-09 DIAGNOSIS — G89.29 CHRONIC LEFT SHOULDER PAIN: Primary | ICD-10-CM

## 2022-06-09 DIAGNOSIS — Z96.612 STATUS POST TOTAL REPLACEMENT OF LEFT SHOULDER: ICD-10-CM

## 2022-06-09 PROCEDURE — 97110 THERAPEUTIC EXERCISES: CPT

## 2022-06-09 PROCEDURE — 97112 NEUROMUSCULAR REEDUCATION: CPT

## 2022-06-09 PROCEDURE — 97140 MANUAL THERAPY 1/> REGIONS: CPT

## 2022-06-09 NOTE — PROGRESS NOTES
Daily Note     Today's date: 2022  Patient name: Isreal Miller  : 1955  MRN: [id number]  Referring provider: Campbell Martínez MD  Dx:   Encounter Diagnosis     ICD-10-CM    1  Chronic left shoulder pain  M25 512     G89 29    2  Status post total replacement of left shoulder  Z96 612                   Subjective: Pt reports his back has really been bothering him lately so he hasnt been able to focus much on his shoulder but he notes that after therapy he is usually sore but once soreness subsides he is better than previously and gains more function       Objective: See treatment diary below      Assessment: Tolerated treatment well  Pt still have difficulty with active flexion in supine against resistance with just L arm  He is able to do with cane against resistance but most work being done by uninvolved arm  He does benefit from the TB resistive exercises and does well with them  Will continue to progress as able and encouraged pt to work on HEP more often  Patient would benefit from continued PT      Plan: Continue per plan of care  Precautions: s/p L TSA  Other factors: FALLS RISK  Pt goals:  EPOC: 6/10/22  F/u with referring:      HEP:  Access Code: [email or web address]  URL: [email or web address]  Date: 2022  Prepared by: Lisa Point    Exercises  · Supine Shoulder External Rotation with Dowel - 20 reps  · Supine Shoulder Flexion Extension AAROM with Dowel - 20 reps  · Standing Shoulder Abduction AAROM with Dowel - 20 reps            Manuals 5/5 5/10 5/12 5/17 5/19 5/24 5/26 5/31 6 6   L shoulder PROM WE PF JM PF PF MM CF WE WE WE   R UT and cervical STM WE   PF PF MM CF WE WE pec release WE   Rhythmic stab  PF  PF PF MM CF WE WE WE                Neuro Re-Ed             scap retraction x10 x10 20x 20x 20x 20x 20x  20     Prone squeeze                                                                              Ther Ex             pendulums 20x            pulleys 2'/2' NV 2' flex 3 min 3 min  3 min 3 min 3'/3' 3'/3' 3'/3'   Dowel sh' flex Supine  15x NBV Supine 20x 20x 20x 20x supine 20x  supine 20x  Supine  2# cuff  x20 Supine  2# cuff  x20   Dowel sh' ER Supine  15x NV Supine 20x supine 20x  supine 20x supine 20x  supine 20x  Supine  x20 Supine  x20   Dowel sh' abd Stand  x15 NV Stand 20x 20x         Sh' iso flex/ext, abd/add, er/ir 5"x10 ea Manual 10x5" ea 5"x10 ea 5x10" 5x10" ea 5x10" ea 5 x 10" ea  5"x10 ea 5"x10 ea 5"x20 ea   arom sh' flex x10   x10 with cane x10 with cane x10 w cane x10 @ mirror 10x @ mirror 10x @ mirror x10   arom sh' ext             arom sh' ER/IR             arom sh' scap       10x @ mirror  10x @ mirror 10x @ mirror x10   arom sh' abd x10      10x @ mirror 10x @ mirror 10x @ mirror x10   Bent over Rows x15            SL ER  20x 20x 20x 20x 20x 20x  20x x20 20   SL ABD and H ABD    20x ea 20x  20x ea 20x ea  20x ea x20 ea 20   TB Rows         GTB  x15 GTB  x15   TB Ext         GTB  x15 GTB  x15                Ther Activity                                       Gait Training                                       Modalities             Cp prn

## 2022-06-14 ENCOUNTER — APPOINTMENT (OUTPATIENT)
Dept: PHYSICAL THERAPY | Facility: CLINIC | Age: 67
End: 2022-06-14
Payer: COMMERCIAL

## 2022-06-16 ENCOUNTER — OFFICE VISIT (OUTPATIENT)
Dept: PHYSICAL THERAPY | Facility: CLINIC | Age: 67
End: 2022-06-16
Payer: COMMERCIAL

## 2022-06-16 DIAGNOSIS — M25.512 CHRONIC LEFT SHOULDER PAIN: Primary | ICD-10-CM

## 2022-06-16 DIAGNOSIS — G89.29 CHRONIC LEFT SHOULDER PAIN: Primary | ICD-10-CM

## 2022-06-16 DIAGNOSIS — Z96.612 STATUS POST TOTAL REPLACEMENT OF LEFT SHOULDER: ICD-10-CM

## 2022-06-16 PROCEDURE — 97112 NEUROMUSCULAR REEDUCATION: CPT

## 2022-06-16 PROCEDURE — 97110 THERAPEUTIC EXERCISES: CPT

## 2022-06-16 PROCEDURE — 97140 MANUAL THERAPY 1/> REGIONS: CPT

## 2022-06-21 ENCOUNTER — APPOINTMENT (OUTPATIENT)
Dept: RADIOLOGY | Facility: CLINIC | Age: 67
End: 2022-06-21
Payer: COMMERCIAL

## 2022-06-21 ENCOUNTER — OFFICE VISIT (OUTPATIENT)
Dept: PHYSICAL THERAPY | Facility: CLINIC | Age: 67
End: 2022-06-21
Payer: COMMERCIAL

## 2022-06-21 DIAGNOSIS — I15.2 HYPERTENSION ASSOCIATED WITH DIABETES (HCC): ICD-10-CM

## 2022-06-21 DIAGNOSIS — L97.501: ICD-10-CM

## 2022-06-21 DIAGNOSIS — M25.512 CHRONIC LEFT SHOULDER PAIN: Primary | ICD-10-CM

## 2022-06-21 DIAGNOSIS — G89.29 CHRONIC LEFT SHOULDER PAIN: Primary | ICD-10-CM

## 2022-06-21 DIAGNOSIS — M79.675 TOE PAIN, LEFT: ICD-10-CM

## 2022-06-21 DIAGNOSIS — Z96.612 STATUS POST TOTAL REPLACEMENT OF LEFT SHOULDER: ICD-10-CM

## 2022-06-21 DIAGNOSIS — E11.59 HYPERTENSION ASSOCIATED WITH DIABETES (HCC): ICD-10-CM

## 2022-06-21 PROCEDURE — 97140 MANUAL THERAPY 1/> REGIONS: CPT | Performed by: PHYSICAL THERAPIST

## 2022-06-21 PROCEDURE — 73630 X-RAY EXAM OF FOOT: CPT

## 2022-06-21 PROCEDURE — 97110 THERAPEUTIC EXERCISES: CPT | Performed by: PHYSICAL THERAPIST

## 2022-06-21 NOTE — PROGRESS NOTES
Daily Note     Today's date: 2022  Patient name: Mag Constantino  : 1955  MRN: [id number]  Referring provider: Rachel Cole MD  Dx:   Encounter Diagnosis     ICD-10-CM    1  Chronic left shoulder pain  M25 512     G89 29    2  Status post total replacement of left shoulder  Z96 612                   Subjective: Notes feeling continued pain at anterior shoulder and referral down lateral arm and posterior scapula  Objective: See treatment diary below      Assessment: Continues to have pain and discomfort with resisted shoulder IR with subscapularis isolation- poor ability to perform belly press with elevation without pectoral substitution  Able to perform isometrics with HEP for isolated IR and performance of belly press without difficulty  Plan: Continue per plan of care  Precautions: s/p L TSA  Other factors: FALLS RISK  Pt goals:  EPOC: 6/10/22  F/u with referring:      HEP:  Access Code: [email or web address]  URL: [email or web address]  Date: 2022  Prepared by: Liyah Dorsey    Exercises  · Supine Shoulder External Rotation with Dowel - 20 reps  · Supine Shoulder Flexion Extension AAROM with Dowel - 20 reps  · Standing Shoulder Abduction AAROM with Dowel - 20 reps            Manuals    L shoulder PROM WE PF    MM CF WE WE WE   R UT and cervical STM WE PF    MM CF WE WE pec release WE   Rhythmic stab WE     MM CF WE WE WE    25' 35'           Neuro Re-Ed             scap retraction      20x 20x  20     Prone squeeze                                                                              Ther Ex             pendulums 20x            pulleys 3'/3'     3 min 3 min 3'/3' 3'/3' 3'/3'   Dowel sh' flex      20x supine 20x  supine 20x  Supine  2# cuff  x20 Supine  2# cuff  x20   Dowel sh' ER      supine 20x supine 20x  supine 20x  Supine  x20 Supine  x20   Dowel sh' abd             Sh' iso flex/ext, abd/add, er/ir  10x5"    5x10" ea 5 x 10" ea 5"x10 ea 5"x10 ea 5"x20 ea   arom sh' flex      x10 w cane x10 @ mirror 10x @ mirror 10x @ mirror x10   arom sh' ext             arom sh' ER/IR             arom sh' scap       10x @ mirror  10x @ mirror 10x @ mirror x10   arom sh' abd       10x @ mirror 10x @ mirror 10x @ mirror x10   Bent over Rows             SL ER      20x 20x  20x x20 20   SL ABD and H ABD      20x ea 20x ea  20x ea x20 ea 20   TB Rows         GTB  x15 GTB  x15   TB Ext         GTB  x15 GTB  x15   Belly press isometrics  10x5"           Ther Activity                                       Gait Training                                       Modalities             Cp prn

## 2022-06-23 ENCOUNTER — OFFICE VISIT (OUTPATIENT)
Dept: PHYSICAL THERAPY | Facility: CLINIC | Age: 67
End: 2022-06-23
Payer: COMMERCIAL

## 2022-06-23 DIAGNOSIS — M25.512 CHRONIC LEFT SHOULDER PAIN: Primary | ICD-10-CM

## 2022-06-23 DIAGNOSIS — G89.29 CHRONIC LEFT SHOULDER PAIN: Primary | ICD-10-CM

## 2022-06-23 DIAGNOSIS — Z96.612 STATUS POST TOTAL REPLACEMENT OF LEFT SHOULDER: ICD-10-CM

## 2022-06-23 PROCEDURE — 97140 MANUAL THERAPY 1/> REGIONS: CPT

## 2022-06-23 PROCEDURE — 97112 NEUROMUSCULAR REEDUCATION: CPT

## 2022-06-23 PROCEDURE — 97110 THERAPEUTIC EXERCISES: CPT

## 2022-06-23 NOTE — PROGRESS NOTES
Daily Note     Today's date: 2022  Patient name: Tristan Gardner  : 1955  MRN: [id number]  Referring provider: Berkley Richards MD  Dx:   Encounter Diagnosis     ICD-10-CM    1  Chronic left shoulder pain  M25 512     G89 29    2  Status post total replacement of left shoulder  Z96 612                   Subjective: Pt reports his shoulder has been doing decent, still pain with active movement but getting better  His back has really been bothering him  Objective: See treatment diary below      Assessment: Pt still having subscap weakness and difficulty with belly press exercise  His overall PROM does continue to improve and AA only needed minimal assistance today  He is showing progress in some aspects but also still have a lot of pain and weakness  Plan: Continue per plan of care  Precautions: s/p L TSA  Other factors: FALLS RISK  Pt goals:  EPOC: 6/10/22  F/u with referring:      HEP:  Access Code: [email or web address]  URL: [email or web address]/  Date: 2022  Prepared by: Kj Simon    Exercises  · Supine Shoulder External Rotation with Dowel - 20 reps  · Supine Shoulder Flexion Extension AAROM with Dowel - 20 reps  · Standing Shoulder Abduction AAROM with Dowel - 20 reps            Manuals    L shoulder PROM WE PF WE   MM CF WE WE WE   R UT and cervical STM WE PF WE   MM CF WE WE pec release WE   Rhythmic stab WE     MM CF WE WE WE    25' 35' 25'          Neuro Re-Ed             scap retraction      20x 20x  20     Prone squeeze                                                                              Ther Ex             pendulums 20x            pulleys 3'/3'  3'/3'   3 min 3 min 3'/3' 3'/3' 3'/3'   Dowel sh' flex      20x supine 20x  supine 20x  Supine  2# cuff  x20 Supine  2# cuff  x20   Dowel sh' ER      supine 20x supine 20x  supine 20x  Supine  x20 Supine  x20   Dowel sh' abd             Sh' iso flex/ext, abd/add, er/ir  10x5" 5"x20 ea  manual   5x10" ea 5 x 10" ea  5"x10 ea 5"x10 ea 5"x20 ea   arom sh' flex   AA  x10   x10 w cane x10 @ mirror 10x @ mirror 10x @ mirror x10   arom sh' ext             arom sh' ER/IR   AA  x10          arom sh' scap       10x @ mirror  10x @ mirror 10x @ mirror x10   arom sh' abd   AA x10    10x @ mirror 10x @ mirror 10x @ mirror x10   Bent over Rows             SL ER      20x 20x  20x x20 20   SL ABD and H ABD      20x ea 20x ea  20x ea x20 ea 20   TB Rows         GTB  x15 GTB  x15   TB Ext         GTB  x15 GTB  x15   Belly press isometrics  10x5" 5"x15          Ther Activity                                       Gait Training                                       Modalities             Cp prn

## 2022-06-28 ENCOUNTER — APPOINTMENT (OUTPATIENT)
Dept: PHYSICAL THERAPY | Facility: CLINIC | Age: 67
End: 2022-06-28
Payer: COMMERCIAL

## 2022-06-28 ENCOUNTER — OFFICE VISIT (OUTPATIENT)
Dept: PHYSICAL THERAPY | Facility: CLINIC | Age: 67
End: 2022-06-28
Payer: COMMERCIAL

## 2022-06-28 DIAGNOSIS — M25.512 CHRONIC LEFT SHOULDER PAIN: Primary | ICD-10-CM

## 2022-06-28 DIAGNOSIS — Z96.612 STATUS POST TOTAL REPLACEMENT OF LEFT SHOULDER: ICD-10-CM

## 2022-06-28 DIAGNOSIS — G89.29 CHRONIC LEFT SHOULDER PAIN: Primary | ICD-10-CM

## 2022-06-28 PROCEDURE — 97110 THERAPEUTIC EXERCISES: CPT

## 2022-06-28 PROCEDURE — 97140 MANUAL THERAPY 1/> REGIONS: CPT

## 2022-06-28 PROCEDURE — 97112 NEUROMUSCULAR REEDUCATION: CPT

## 2022-06-28 NOTE — PROGRESS NOTES
Daily Note     Today's date: 2022  Patient name: Mag Constantino  : 1955  MRN: [id number]  Referring provider: Rachel Cole MD  Dx:   Encounter Diagnosis     ICD-10-CM    1  Chronic left shoulder pain  M25 512     G89 29    2  Status post total replacement of left shoulder  Z96 612                   Subjective: Pt reports his back is bothering him mostly but shoulder also still has a lot of pain  Objective: See treatment diary below      Assessment: Pt continues to note pain in the shoulder with any active movements, a constant ache which increases to a sharp pain with he moves his shoulder  His PROM continues to look good and continues to improve  Recommended he try icing the shoulder steadily 20 min on 20 min off for a few days to help decrease inflammation  Also trialed IASTM to promote blood flow and healing  Plan: Continue per plan of care  Precautions: s/p L TSA  Other factors: FALLS RISK  Pt goals:  EPOC: 6/10/22  F/u with referring:      HEP:  Access Code: [email or web address]  URL: [email or web address]  Date: 2022  Prepared by: Liyah Dorsey    Exercises  · Supine Shoulder External Rotation with Dowel - 20 reps  · Supine Shoulder Flexion Extension AAROM with Dowel - 20 reps  · Standing Shoulder Abduction AAROM with Dowel - 20 reps            Manuals    L shoulder PROM WE PF WE WE  MM CF WE WE WE   L UT and cervical STM WE PF WE WE  MM CF WE WE pec release WE   Rhythmic stab WE     MM CF WE WE WE   L shoulder/bicep IASTM    WE          25' 35' 25' 30'         Neuro Re-Ed             scap retraction    20  20x 20x  20     Prone squeeze                                                                              Ther Ex             pendulums 20x            pulleys 3'/3'  3'/3' 3'/3'  3 min 3 min 3'/3' 3'/3' 3'/3'   Dowel sh' flex      20x supine 20x  supine 20x  Supine  2# cuff  x20 Supine  2# cuff  x20   Dowel sh' ER      supine 20x supine 20x  supine 20x  Supine  x20 Supine  x20   Dowel sh' abd             Sh' iso flex/ext, abd/add, er/ir  10x5" 5"x20 ea  manual 5"x10 ea manual  5x10" ea 5 x 10" ea  5"x10 ea 5"x10 ea 5"x20 ea   arom sh' flex   AA  x10 AA  x10  x10 w cane x10 @ mirror 10x @ mirror 10x @ mirror x10   arom sh' ext             arom sh' ER/IR   AA  x10 AA  x10         arom sh' scap       10x @ mirror  10x @ mirror 10x @ mirror x10   arom sh' abd   AA x10 AA  x10   10x @ mirror 10x @ mirror 10x @ mirror x10   Bent over Rows             SL ER    x10  20x 20x  20x x20 20   SL ABD and H ABD      20x ea 20x ea  20x ea x20 ea 20   TB Rows         GTB  x15 GTB  x15   TB Ext         GTB  x15 GTB  x15   Belly press isometrics  10x5" 5"x15          Ther Activity                                       Gait Training                                       Modalities             Cp prn

## 2022-06-30 ENCOUNTER — OFFICE VISIT (OUTPATIENT)
Dept: PHYSICAL THERAPY | Facility: CLINIC | Age: 67
End: 2022-06-30
Payer: COMMERCIAL

## 2022-06-30 DIAGNOSIS — M25.512 CHRONIC LEFT SHOULDER PAIN: Primary | ICD-10-CM

## 2022-06-30 DIAGNOSIS — Z96.612 STATUS POST TOTAL REPLACEMENT OF LEFT SHOULDER: ICD-10-CM

## 2022-06-30 DIAGNOSIS — G89.29 CHRONIC LEFT SHOULDER PAIN: Primary | ICD-10-CM

## 2022-06-30 PROCEDURE — 97140 MANUAL THERAPY 1/> REGIONS: CPT | Performed by: PHYSICAL THERAPIST

## 2022-06-30 PROCEDURE — 97110 THERAPEUTIC EXERCISES: CPT | Performed by: PHYSICAL THERAPIST

## 2022-06-30 NOTE — PROGRESS NOTES
Daily Note     Today's date: 2022  Patient name: Mag Constantino  : 1955  MRN: [id number]  Referring provider: Rachel Cole MD  Dx:   Encounter Diagnosis     ICD-10-CM    1  Chronic left shoulder pain  M25 512     G89 29    2  Status post total replacement of left shoulder  Z96 612                   Subjective: Notes feeling continued pain and discomfort with reaching  Objective: See treatment diary below      Assessment: Continues to have pain and discomfort with performance of shoulder ER and end range shoulder flexion  Restriction from periscapular mms at teres major and latissimus dorsi improved post pin it stretch to lateral border of scapula and manual stretch of humerus into flexion and scaption  Progression with added TBand IR/ER and flex  Scap shrug signs noted suggesting continued weakness  Plan: Continue per plan of care  Precautions: s/p L TSA  Other factors: FALLS RISK  Pt goals:  EPOC: 6/10/22  F/u with referring:      HEP:  Access Code: [email or web address]  URL: [email or web address]  Date: 2022  Prepared by: Liyah Dorsey    Exercises  · Supine Shoulder External Rotation with Dowel - 20 reps  · Supine Shoulder Flexion Extension AAROM with Dowel - 20 reps  · Standing Shoulder Abduction AAROM with Dowel - 20 reps            Manuals    L shoulder PROM WE PF WE WE PF MM CF WE WE WE   L UT and cervical STM WE PF WE WE PF MM CF WE WE pec release WE   Rhythmic stab WE    PF MM CF WE WE WE   L shoulder/bicep IASTM    WE          25' 35' 25' 30' 25'        Neuro Re-Ed             scap retraction    20 20x 20x 20x  20     Prone squeeze                                                                              Ther Ex             pendulums 20x            pulleys 3'/3'  3'/3' 3'/3'  3 min 3 min 3'/3' 3'/3' 3'/3'   Dowel sh' flex      20x supine 20x  supine 20x  Supine  2# cuff  x20 Supine  2# cuff  x20   Dowel sh' ER supine 20x supine 20x  supine 20x  Supine  x20 Supine  x20   Dowel sh' abd             Sh' iso flex/ext, abd/add, er/ir  10x5" 5"x20 ea  manual 5"x10 ea manual  5x10" ea 5 x 10" ea  5"x10 ea 5"x10 ea 5"x20 ea   arom sh' flex   AA  x10 AA  x10 10x x10 w cane x10 @ mirror 10x @ mirror 10x @ mirror x10   arom sh' ext             arom sh' ER/IR   AA  x10 AA  x10 Pch 2x10        arom sh' scap     Pch 2x10  10x @ mirror  10x @ mirror 10x @ mirror x10   arom sh' abd   AA x10 AA  x10 2x10  10x @ mirror 10x @ mirror 10x @ mirror x10   Bent over Rows             SL ER    x10  20x 20x  20x x20 20   SL ABD and H ABD      20x ea 20x ea  20x ea x20 ea 20   TB Rows     Machine 45 lbs     GTB  x15 GTB  x15   TB Ext     LPD 45 lbs     GTB  x15 GTB  x15   Belly press isometrics  10x5" 5"x15          Ther Activity             UBE     2/2                     Gait Training                                       Modalities             Cp prn

## 2022-07-05 ENCOUNTER — APPOINTMENT (OUTPATIENT)
Dept: PHYSICAL THERAPY | Facility: CLINIC | Age: 67
End: 2022-07-05
Payer: MEDICARE

## 2022-07-07 ENCOUNTER — APPOINTMENT (OUTPATIENT)
Dept: PHYSICAL THERAPY | Facility: CLINIC | Age: 67
End: 2022-07-07
Payer: MEDICARE

## 2022-07-12 ENCOUNTER — OFFICE VISIT (OUTPATIENT)
Dept: PHYSICAL THERAPY | Facility: CLINIC | Age: 67
End: 2022-07-12
Payer: MEDICARE

## 2022-07-12 DIAGNOSIS — Z96.612 STATUS POST TOTAL REPLACEMENT OF LEFT SHOULDER: ICD-10-CM

## 2022-07-12 DIAGNOSIS — G89.29 CHRONIC LEFT SHOULDER PAIN: Primary | ICD-10-CM

## 2022-07-12 DIAGNOSIS — M25.512 CHRONIC LEFT SHOULDER PAIN: Primary | ICD-10-CM

## 2022-07-12 PROCEDURE — 97110 THERAPEUTIC EXERCISES: CPT

## 2022-07-12 PROCEDURE — 97140 MANUAL THERAPY 1/> REGIONS: CPT

## 2022-07-12 NOTE — PROGRESS NOTES
Daily Note     Today's date: 2022  Patient name: Marta Duncan  : 1955  MRN: [id number]  Referring provider: Bobby Moeller MD  Dx:   Encounter Diagnosis     ICD-10-CM    1  Chronic left shoulder pain  M25 512     G89 29    2  Status post total replacement of left shoulder  Z96 612                   Subjective: Pt reports he is still having a lot of anterior shoulder pain with active movements  His back continues to bother him as well- plans to get script to begin PT for his back  Objective: See treatment diary below      Assessment: Pt having more pain with active shoulder activation today with noted popping most times  Pt has show improved PROM which is almost [facility] in all planes yet weakness is still present  Shown some improvement in IR strength with isometrics       Plan: Continue per plan of care  Precautions: s/p L TSA  Other factors: FALLS RISK  Pt goals:  EPOC: 6/10/22  F/u with referring:      HEP:  Access Code: [email or web address]  URL: [email or web address]  Date: 2022  Prepared by: Elnoria Shaker    Exercises  · Supine Shoulder External Rotation with Dowel - 20 reps  · Supine Shoulder Flexion Extension AAROM with Dowel - 20 reps  · Standing Shoulder Abduction AAROM with Dowel - 20 reps            Manuals        L shoulder PROM WE PF WE WE PF WE       L UT and cervical STM WE PF WE WE PF WE       Rhythmic stab WE    PF WE       L shoulder/bicep IASTM    WE          25' 35' 25' 30' 25'        Neuro Re-Ed             scap retraction    20 20x 20x       Prone squeeze                                                                              Ther Ex             pendulums 20x            pulleys 3'/3'  3'/3' 3'/3'  3'/3'       Dowel sh' flex             Dowel sh' ER             Dowel sh' abd             Sh' iso flex/ext, abd/add, er/ir  10x5" 5"x20 ea  manual 5"x10 ea manual  5"x20 ea       arom sh' flex   AA  x10 AA  x10 10x x15       arom sh' ext x15       arom sh' ER/IR   AA  x10 AA  x10 Pch 2x10 manual resisted       arom sh' scap     Pch 2x10        arom sh' abd   AA x10 AA  x10 2x10        Bent over Rows             SL ER    x10  x20       SL ABD and H ABD      x20       TB Rows     Machine 45 lbs   NV       TB Ext     LPD 45 lbs   NV       Belly press isometrics  10x5" 5"x15          Ther Activity             UBE     2/2 3'/3'                    Gait Training                                       Modalities             Cp prn

## 2022-07-14 ENCOUNTER — APPOINTMENT (OUTPATIENT)
Dept: PHYSICAL THERAPY | Facility: CLINIC | Age: 67
End: 2022-07-14
Payer: MEDICARE

## 2022-07-19 ENCOUNTER — OFFICE VISIT (OUTPATIENT)
Dept: PHYSICAL THERAPY | Facility: CLINIC | Age: 67
End: 2022-07-19
Payer: MEDICARE

## 2022-07-19 DIAGNOSIS — G89.29 CHRONIC LEFT SHOULDER PAIN: Primary | ICD-10-CM

## 2022-07-19 DIAGNOSIS — M25.512 CHRONIC LEFT SHOULDER PAIN: Primary | ICD-10-CM

## 2022-07-19 DIAGNOSIS — Z96.612 STATUS POST TOTAL REPLACEMENT OF LEFT SHOULDER: ICD-10-CM

## 2022-07-19 PROCEDURE — 97110 THERAPEUTIC EXERCISES: CPT

## 2022-07-19 PROCEDURE — 97112 NEUROMUSCULAR REEDUCATION: CPT

## 2022-07-19 PROCEDURE — 97140 MANUAL THERAPY 1/> REGIONS: CPT

## 2022-07-19 NOTE — PROGRESS NOTES
Daily Note     Today's date: 2022  Patient name: Nawaf Michelle  : 1955  MRN: [id number]  Referring provider: Lewis Victoria MD  Dx:   Encounter Diagnosis     ICD-10-CM    1  Chronic left shoulder pain  M25 512     G89 29    2  Status post total replacement of left shoulder  [id number] [id number]                   Subjective: Pt reports due to his back pain he hasnt been working on his shoulder exercises as much as he'd like but he isnt having as much pain in the shoulder as he had, but shoulder just feeling more tight than it does painful  Objective: See treatment diary below      Assessment: Pt showing improved IR/ER strength  Continues to have improved PROM  AROM still challenging past 90 degrees with shoulder elevation due to compensation  Worked on isometrics t/o flexion ROM which was less pain on his back as well  Plan: Continue per plan of care  Precautions: s/p L TSA  Other factors: FALLS RISK  Pt goals:  EPOC: 6/10/22  F/u with referring:      HEP:  Access Code: [email or web address]  URL: [email or web address]  Date: 2022  Prepared by: Margareth Avila    Exercises  · Supine Shoulder External Rotation with Dowel - 20 reps  · Supine Shoulder Flexion Extension AAROM with Dowel - 20 reps  · Standing Shoulder Abduction AAROM with Dowel - 20 reps            Manuals       L shoulder PROM WE PF WE WE PF WE WE      L UT and cervical STM WE PF WE WE PF WE WE      Rhythmic stab WE    PF WE WE      L shoulder/bicep IASTM    WE          25' 35' 25' 30' 25'        Neuro Re-Ed             scap retraction    20 20x 20x 20x      Prone squeeze                                                                              Ther Ex             pendulums 20x            pulleys 3'/3'  3'/3' 3'/3'  3'/3'       Dowel sh' flex             Dowel sh' ER             Dowel sh' abd             Sh' iso flex/ext, abd/add, er/ir  10x5" 5"x20 ea  manual 5"x10 ea manual  5"x20 ea       arom sh' flex   AA  x10 AA  x10 10x x15 x10      arom sh' ext      x15 x10      arom sh' ER/IR   AA  x10 AA  x10 Pch 2x10 manual resisted       arom sh' scap     Pch 2x10  x10      arom sh' abd   AA x10 AA  x10 2x10  x10      Bent over Rows             SL ER    x10  x20 x20      SL ABD and H ABD      x20 x20      TB Rows     Machine 45 lbs   NV Machine 45 lbs  x10      TB Ext     LPD 45 lbs   NV LPD 45 lbs   x10      Belly press isometrics  10x5" 5"x15          Ther Activity             UBE     2/2 3'/3' 3'/3'                   Gait Training                                       Modalities             Cp prn

## 2022-07-21 ENCOUNTER — EVALUATION (OUTPATIENT)
Dept: PHYSICAL THERAPY | Facility: CLINIC | Age: 67
End: 2022-07-21
Payer: MEDICARE

## 2022-07-21 DIAGNOSIS — Z96.612 STATUS POST TOTAL REPLACEMENT OF LEFT SHOULDER: ICD-10-CM

## 2022-07-21 DIAGNOSIS — G89.29 CHRONIC LEFT SHOULDER PAIN: Primary | ICD-10-CM

## 2022-07-21 DIAGNOSIS — M25.512 CHRONIC LEFT SHOULDER PAIN: Primary | ICD-10-CM

## 2022-07-21 PROCEDURE — 97110 THERAPEUTIC EXERCISES: CPT | Performed by: PHYSICAL THERAPIST

## 2022-07-21 PROCEDURE — 97140 MANUAL THERAPY 1/> REGIONS: CPT | Performed by: PHYSICAL THERAPIST

## 2022-07-21 NOTE — PROGRESS NOTES
PT Re-Evaluation     Today's date: 2022  Patient name: Frank Thomson  : 1955  MRN: [id number]  Referring provider: Mariam Villeda MD  Dx:   Encounter Diagnosis     ICD-10-CM    1  Chronic left shoulder pain  M25 512     G89 29    2  Status post total replacement of left shoulder  [id number] [id number]                   Assessment  Assessment details: Frank Thomson is a 61 y o  male presenting to outpatient physical therapy at [place] [address] with complaints of L shoulder pain s/p TSA   They present with decreased range of motion, decreased strength, limited flexibility, poor postural awareness, poor body mechanics, poor balance, decreased tolerance to activity and decreased functional mobility due to Chronic left shoulder pain  (primary encounter diagnosis)  Status post total replacement of left shoulder  Sushma Mcdaniel would benefit from skilled physical therapy to address noted impairments in order to allow for full functional return to work and ADL-related activities  Thank you for the referral!    Re-assessment 22:  Tori Hernández has attended 7 visits since the initiation of PT and reports a 30% GROC  Clinically demonstrates improved L shoulder ROM and strength in all planes yet continues to have limited ability to perform full AROM and continued RTC weakness into flexion and ABD  Tori Hernández continues to have pain and discomfort with performance with reaching behind and OH as well suggesting that he will continue to benefit from skilled OPPT to address his remaining deficits, achieve established goals and return to PLOF pain-free  Re-assessment 22:  Tori Hernández has attended 20 visits since the initiation of PT and reports a 40% GROC  Clinically demonstrates decreased AROM in all planes likely from myofascial restriction on periscapular plane and fear avoidance    His IR/ER strength has greatly improved but still very limited with flexion strength- facilitation of serratus strength and deltoid will be beneficial moving forward  Compliance with HEP will be paramount  Due to his remaining impairments he will continue to benefit from skilled OPPT to address his remaining impairments, achieve established goals and return to PLOF pain-free  Impairments: abnormal muscle firing, abnormal or restricted ROM, activity intolerance, impaired balance, impaired physical strength, lacks appropriate home exercise program, pain with function and poor body mechanics  Barriers to therapy: n/a  Understanding of Dx/Px/POC: excellent  Goals  ST   Independent with HEP in 2 weeks- met  2  Decrease pain by 50% in 3 wks- met  3   Increase shoulder elevation AROM to 90 degrees in 3 weeks- met     LT  Achieve FOTO score of 55/100 in 6 weeks - met  2   Able to demo full L shoulder PROM all planes in 6 weeks- met  3  Strength = 5/5 L shoulder all planes in 6 weeks- partially met    4  Able to achieve full L shoulder AROM all planes in 8 weeks  - not met  5  Able to reach a shelf OH with 3# in 8 weeks- not met      Plan  Plan details: RE in 6 weeks  Patient would benefit from: skilled physical therapy  Planned modality interventions: cryotherapy, electrical stimulation/Russian stimulation and thermotherapy: hydrocollator packs  Planned therapy interventions: abdominal trunk stabilization, manual therapy, neuromuscular re-education, therapeutic activities, therapeutic exercise, body mechanics training and home exercise program  Frequency: 2x week  Duration in visits: 12  Duration in weeks: 6  Plan of Care beginning date: 2022  Plan of Care expiration date: 2022  Treatment plan discussed with: patient        Subjective Evaluation    History of Present Illness  Mechanism of injury: S/p L total shoulder replacement  DOS: 3/11/22    After surgery, he went to [facility] for rehab for 1 day  He reports overall he is doing alright  Strength is not too bad but motion is limited   He has occasional pain to the anterior shoulder and tricep  Rated 8/10  The R shoulder bothers him more than the L, attributes this to the way he sleeps  He sleeps on his R shoulder in the living room on a hospital bed  He lives at home with his wife  He normally ambulates with a SPC for balance  He does not work, he spends his time doing work around [employer]  His wife assists with dressing, bathing, driving  Re-assessment 22:  Sean Willams has attended 7 visits since the initiation of PT and reports a 30% GROC  Reports improved mobility with OH reaching and lifting as well as flexion and ABD mobility  Denies any difficulty with self grooming but still needs some assistance with donning shirts and putting on a jacket  Notes good sleep overall without disturbance  Notes progression with strength isometrically  Wishes to continue with OPPT in order to 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  F/U with MD in   Re-assessment 22:  Sean Willams has attended 21 visits since the initiation of PT and reports a 40% GROC  Reports continued relief from OPPT- still getting superior shoulder discomfort and tricep referral with potential teres major discomfort  Getting pain with pushing up from laying down and discomfort with moving the muscle with walking at times  Still having a lot of weakness with OH reaching and lifting  No F/U with MD   Notes still having trouble with sleeping      Pain  Current pain ratin  At best pain ratin  At worst pain rating: 10  Location: L shoulder    Patient Goals  Patient goals for therapy: decreased edema, decreased pain, improved balance, increased motion, return to sport/leisure activities, return to work, independence with ADLs/IADLs and increased strength          Objective     Postural Observations    Additional Postural Observation Details  L shoulder slight elevation    Tenderness     Additional Tenderness Details  TTP L shoulder anterior joint line, AC joint, posterior joint line, triceps, UT, supraspin, teres    Cervical/Thoracic Screen   Cervical range of motion within normal limits  Thoracic range of motion within normal limits    Active Range of Motion   Left Shoulder   Flexion: 70 degrees with pain  Extension: 70 degrees WFL  Abduction: 60 degrees with pain  External rotation 0°: 55 degrees with pain  Internal rotation 0°: Left shoulder active internal rotation at 0 degrees: to body  [facility]  Internal rotation BTB: L3     Right Shoulder   Flexion: 130 degrees WFL  Extension: 70 degrees WFL  Abduction: 110 degrees WFL  External rotation BTH: T2 WFL  Internal rotation BTB: T12 WFL    Passive Range of Motion   Left Shoulder   Flexion: 155 degrees with pain  Abduction: 124 degrees with pain  External rotation 45°: 65 degrees with pain  Internal rotation 45°: 70 degrees     Strength/Myotome Testing     Left Shoulder     Planes of Motion   Flexion: 4-   Extension: 5   Abduction: 4   Adduction: 5   External rotation at 0°: 5   Internal rotation at 0°: 4+     Isolated Muscles   Biceps: 5   Triceps: 5     Right Shoulder   Normal muscle strength             Precautions: s/p L TSA  Other factors: FALLS RISK  Pt goals:  EPOC: 6/10/22  F/u with referring:      HEP:  Access Code: [email or web address]  URL: [email or web address]  Date: 04/26/2022  Prepared by: Carrie Celis    Exercises  · Supine Shoulder External Rotation with Dowel - 20 reps  · Supine Shoulder Flexion Extension AAROM with Dowel - 20 reps  · Standing Shoulder Abduction AAROM with Dowel - 20 reps            Manuals 6/16 6/21 6/23 6/28 6/30 7/12 7/19 7/21     L shoulder PROM WE PF WE WE PF WE WE PF     L UT and cervical STM WE PF WE WE PF WE WE PF     Rhythmic stab WE    PF WE WE PF     L shoulder/bicep IASTM    WE    PF     Re-assess        PF      25' 35' 25' 30' 25'        Neuro Re-Ed             scap retraction    20 20x 20x 20x      Prone squeeze                                                                              Ther Ex pendulums 20x            pulleys 3'/3'  3'/3' 3'/3'  3'/3'       Dowel sh' flex             Dowel sh' ER             Dowel sh' abd             Sh' iso flex/ext, abd/add, er/ir  10x5" 5"x20 ea  manual 5"x10 ea manual  5"x20 ea       arom sh' flex   AA  x10 AA  x10 10x x15 x10 Supine 20x with SA activaiton     arom sh' ext      x15 x10      arom sh' ER/IR   AA  x10 AA  x10 Pch 2x10 manual resisted  Tband GTB 20x ea     arom sh' scap     Pch 2x10  x10      arom sh' abd   AA x10 AA  x10 2x10  x10      Bent over Rows        STD Tband GTB 20x      SL ER    x10  x20 x20      SL ABD and H ABD      x20 x20      TB Rows     Machine 45 lbs   NV Machine 45 lbs  x10 NV     TB Ext     LPD 45 lbs   NV LPD 45 lbs   x10 NV     Belly press isometrics  10x5" 5"x15          Ther Activity             UBE     2/2 3'/3' 3'/3' 3/3                  Gait Training                                       Modalities             Cp prn

## 2022-07-21 NOTE — LETTER
2022    Devin Gillis MD  [address] [place] [address]  [address] [place] [address]    Patient: Salome Meadows   YOB: 1955   Date of Visit: 2022     Encounter Diagnosis     ICD-10-CM    1  Chronic left shoulder pain  M25 512     G89 29    2  Status post total replacement of left shoulder  [id number]        Dear Dr Raza Lima:    Thank you for your recent referral of Salome Meadows  Please review the attached evaluation summary from Juan's recent visit  Please verify that you agree with the plan of care by signing the attached order  If you have any questions or concerns, please do not hesitate to call  I sincerely appreciate the opportunity to share in the care of one of your patients and hope to have another opportunity to work with you in the near future  Sincerely,    Westley Yousif, PT      Referring Provider:      I certify that I have read the below Plan of Care and certify the need for these services furnished under this plan of treatment while under my care  Devin Gillis MD  [address] [place] [address]  [address] [place] [address]  Via Fax: [phone]          PT Re-Evaluation     Today's date: 2022  Patient name: Salome Meadows  : 1955  MRN: [id number]  Referring provider: Alisson Davis MD  Dx:   Encounter Diagnosis     ICD-10-CM    1  Chronic left shoulder pain  M25 512     G89 29    2   Status post total replacement of left shoulder  [id number] [id number]                   Assessment  Assessment details: Salome Meadows is a 61 y o  male presenting to outpatient physical therapy at [place] [address] with complaints of L shoulder pain s/p TSA   They present with decreased range of motion, decreased strength, limited flexibility, poor postural awareness, poor body mechanics, poor balance, decreased tolerance to activity and decreased functional mobility due to Chronic left shoulder pain  (primary encounter diagnosis)  Status post total replacement of left shoulder  Griffin Coronel would benefit from skilled physical therapy to address noted impairments in order to allow for full functional return to work and ADL-related activities  Thank you for the referral!    Re-assessment 22:  Aleksandr Hightower has attended 7 visits since the initiation of PT and reports a 30% GROC  Clinically demonstrates improved L shoulder ROM and strength in all planes yet continues to have limited ability to perform full AROM and continued RTC weakness into flexion and ABD  Aleksandr Hightower continues to have pain and discomfort with performance with reaching behind and OH as well suggesting that he will continue to benefit from skilled OPPT to address his remaining deficits, achieve established goals and return to PLOF pain-free  Re-assessment 22:  Aleksandr Hightower has attended 20 visits since the initiation of PT and reports a 40% GROC  Clinically demonstrates decreased AROM in all planes likely from myofascial restriction on periscapular plane and fear avoidance  His IR/ER strength has greatly improved but still very limited with flexion strength- facilitation of serratus strength and deltoid will be beneficial moving forward  Compliance with HEP will be paramount  Due to his remaining impairments he will continue to benefit from skilled OPPT to address his remaining impairments, achieve established goals and return to PLOF pain-free  Impairments: abnormal muscle firing, abnormal or restricted ROM, activity intolerance, impaired balance, impaired physical strength, lacks appropriate home exercise program, pain with function and poor body mechanics  Barriers to therapy: n/a  Understanding of Dx/Px/POC: excellent  Goals  ST   Independent with HEP in 2 weeks- met  2  Decrease pain by 50% in 3 wks- met  3   Increase shoulder elevation AROM to 90 degrees in 3 weeks- met     LT  Achieve FOTO score of 55/100 in 6 weeks - met  2   Able to demo full L shoulder PROM all planes in 6 weeks- met  3    Strength = 5/5 L shoulder all planes in 6 weeks- partially met    4  Able to achieve full L shoulder AROM all planes in 8 weeks  - not met  5  Able to reach a shelf OH with 3# in 8 weeks- not met      Plan  Plan details: RE in 6 weeks  Patient would benefit from: skilled physical therapy  Planned modality interventions: cryotherapy, electrical stimulation/Russian stimulation and thermotherapy: hydrocollator packs  Planned therapy interventions: abdominal trunk stabilization, manual therapy, neuromuscular re-education, therapeutic activities, therapeutic exercise, body mechanics training and home exercise program  Frequency: 2x week  Duration in visits: 12  Duration in weeks: 6  Plan of Care beginning date: 7/21/2022  Plan of Care expiration date: 9/22/2022  Treatment plan discussed with: patient        Subjective Evaluation    History of Present Illness  Mechanism of injury: S/p L total shoulder replacement  DOS: 3/11/22    After surgery, he went to [facility] for [facility] for 1 day  He reports overall he is doing alright  Strength is not too bad but motion is limited  He has occasional pain to the anterior shoulder and tricep  Rated 8/10  The R shoulder bothers him more than the L, attributes this to the way he sleeps  He sleeps on his R shoulder in the living room on a hospital bed  He lives at home with his wife  He normally ambulates with a SPC for balance  He does not work, he spends his time doing work around [employer]  His wife assists with dressing, bathing, driving  Re-assessment 5/19/22:  Dmitriy Pattersonon has attended 7 visits since the initiation of PT and reports a 30% GROC  Reports improved mobility with OH reaching and lifting as well as flexion and ABD mobility  Denies any difficulty with self grooming but still needs some assistance with donning shirts and putting on a jacket  Notes good sleep overall without disturbance  Notes progression with strength isometrically    Wishes to continue with OPPT in order to 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  F/U with MD in   Re-assessment 22:  Sean Willams has attended 21 visits since the initiation of PT and reports a 40% GROC  Reports continued relief from OPPT- still getting superior shoulder discomfort and tricep referral with potential teres major discomfort  Getting pain with pushing up from laying down and discomfort with moving the muscle with walking at times  Still having a lot of weakness with OH reaching and lifting  No F/U with MD   Notes still having trouble with sleeping  Pain  Current pain ratin  At best pain ratin  At worst pain rating: 10  Location: L shoulder    Patient Goals  Patient goals for therapy: decreased edema, decreased pain, improved balance, increased motion, return to sport/leisure activities, return to work, independence with ADLs/IADLs and increased strength          Objective     Postural Observations    Additional Postural Observation Details  L shoulder slight elevation    Tenderness     Additional Tenderness Details  TTP L shoulder anterior joint line, AC joint, posterior joint line, triceps, UT, supraspin, teres    Cervical/Thoracic Screen   Cervical range of motion within normal limits  Thoracic range of motion within normal limits    Active Range of Motion   Left Shoulder   Flexion: 70 degrees with pain  Extension: 70 degrees WFL  Abduction: 60 degrees with pain  External rotation 0°: 55 degrees with pain  Internal rotation 0°: Left shoulder active internal rotation at 0 degrees: to [facility]  Internal rotation BTB: L3     Right Shoulder   Flexion: 130 degrees WFL  Extension: 70 degrees WFL  Abduction: 110 degrees WFL  External rotation BTH: T2 WFL  Internal rotation BTB: T12 WFL    Passive Range of Motion   Left Shoulder   Flexion: 155 degrees with pain  Abduction: 124 degrees with pain  External rotation 45°: 65 degrees with pain  Internal rotation 45°: 70 degrees     Strength/Myotome Testing     Left Shoulder     Planes of Motion   Flexion: 4-   Extension: 5   Abduction: 4   Adduction: 5   External rotation at 0°: 5   Internal rotation at 0°: 4+     Isolated Muscles   Biceps: 5   Triceps: 5     Right Shoulder   Normal muscle strength             Precautions: s/p L TSA  Other factors: FALLS RISK  Pt goals:  EPOC: 6/10/22  F/u with referring:      HEP:  Access Code: [email or web address]  URL: [email or web address]  Date: 04/26/2022  Prepared by: Krystyna Pate    Exercises  · Supine Shoulder External Rotation with Dowel - 20 reps  · Supine Shoulder Flexion Extension AAROM with Dowel - 20 reps  · Standing Shoulder Abduction AAROM with Dowel - 20 reps            Manuals 6/16 6/21 6/23 6/28 6/30 7/12 7/19 7/21     L shoulder PROM WE PF WE WE PF WE WE PF     L UT and cervical STM WE PF WE WE PF WE WE PF     Rhythmic stab WE    PF WE WE PF     L shoulder/bicep IASTM    WE    PF     Re-assess        PF      25' 35' 25' 30' 25'        Neuro Re-Ed             scap retraction    20 20x 20x 20x      Prone squeeze                                                                              Ther Ex             pendulums 20x            pulleys 3'/3'  3'/3' 3'/3'  3'/3'       Dowel sh' flex             Dowel sh' ER             Dowel sh' abd             Sh' iso flex/ext, abd/add, er/ir  10x5" 5"x20 ea  manual 5"x10 ea manual  5"x20 ea       arom sh' flex   AA  x10 AA  x10 10x x15 x10 Supine 20x with SA activaiton     arom sh' ext      x15 x10      arom sh' ER/IR   AA  x10 AA  x10 Pch 2x10 manual resisted  Tband GTB 20x ea     arom sh' scap     Pch 2x10  x10      arom sh' abd   AA x10 AA  x10 2x10  x10      Bent over Rows        STD Tband GTB 20x      SL ER    x10  x20 x20      SL ABD and H ABD      x20 x20      TB Rows     Machine 45 lbs   NV Machine 45 lbs  x10 NV     TB Ext     LPD 45 lbs   NV LPD 45 lbs   x10 NV     Belly press isometrics  10x5" 5"x15          Ther Activity             UBE     2/2 3'/3' 3'/3' 3/3                  Gait Training Modalities             Cp prn

## 2022-07-26 ENCOUNTER — OFFICE VISIT (OUTPATIENT)
Dept: PHYSICAL THERAPY | Facility: CLINIC | Age: 67
End: 2022-07-26
Payer: MEDICARE

## 2022-07-26 DIAGNOSIS — M25.512 CHRONIC LEFT SHOULDER PAIN: Primary | ICD-10-CM

## 2022-07-26 DIAGNOSIS — G89.29 CHRONIC LEFT SHOULDER PAIN: Primary | ICD-10-CM

## 2022-07-26 DIAGNOSIS — Z96.612 STATUS POST TOTAL REPLACEMENT OF LEFT SHOULDER: ICD-10-CM

## 2022-07-26 PROCEDURE — 97140 MANUAL THERAPY 1/> REGIONS: CPT

## 2022-07-26 PROCEDURE — 97110 THERAPEUTIC EXERCISES: CPT

## 2022-07-26 PROCEDURE — 97112 NEUROMUSCULAR REEDUCATION: CPT

## 2022-07-26 NOTE — PROGRESS NOTES
Daily Note     Today's date: 2022  Patient name: Allen Late  : 1955  MRN: [id number]  Referring provider: Chitra Longoria MD  Dx:   Encounter Diagnosis     ICD-10-CM    1  Chronic left shoulder pain  M25 512     G89 29    2  Status post total replacement of left shoulder  [id number] [id number]                   Subjective: Pt reports his back has still been bothering him but shoulder feels good for like 3 days after therapy until it starts to tighten up and has less motion  (suggested pulleys for home)    Objective: See treatment diary below      Assessment: Pt showed improved strength and ability today  Able to show improved OH shoulder flexion and strength with TE's  IR/ER strength also improved, now main focus is strength OH  Showed improved motivation and mental health today  Tolerated treatment well  Patient demonstrated fatigue post treatment, exhibited good technique with therapeutic exercises and would benefit from continued PT      Plan: Continue per plan of care  Progress treatment as tolerated  Precautions: s/p L TSA  Other factors: FALLS RISK  Pt goals:  EPOC: 6/10/22  F/u with referring:      HEP:  Access Code: [email or web address]  URL: [email or web address]  Date: 2022  Prepared by: Marlene Askew    Exercises  · Supine Shoulder External Rotation with Dowel - 20 reps  · Supine Shoulder Flexion Extension AAROM with Dowel - 20 reps  · Standing Shoulder Abduction AAROM with Dowel - 20 reps            Manuals     L shoulder PROM WE PF WE WE PF WE WE PF WE    L UT and cervical STM WE PF WE WE PF WE WE PF WE    Rhythmic stab WE    PF WE WE PF WE    L shoulder/bicep IASTM    WE    PF WE    Re-assess        PF      25' 35' 25' 30' 25'        Neuro Re-Ed             scap retraction    20 20x 20x 20x      Prone squeeze                                                                              Ther Ex             pendulums 20x            pulleys 3'/3' 3'/3' 3'/3'  3'/3'       Dowel sh' flex             Dowel sh' ER             Dowel sh' abd             Sh' iso flex/ext, abd/add, er/ir  10x5" 5"x20 ea  manual 5"x10 ea manual  5"x20 ea       SA Wall slides         x20    arom sh' flex   AA  x10 AA  x10 10x x15 x10 Supine 20x with SA activaiton x20    arom sh' ext      x15 x10      arom sh' ER/IR   AA  x10 AA  x10 Pch 2x10 manual resisted  Tband GTB 20x ea GTB  x20    arom sh' scap     Pch 2x10  x10      arom sh' abd   AA x10 AA  x10 2x10  x10      Bent over Rows        STD Tband GTB 20x      SL ER    x10  x20 x20      SL ABD and H ABD      x20 x20      TB Rows     Machine 45 lbs   NV Machine 45 lbs  x10 NV CC 55#  x20    TB Ext     LPD 45 lbs   NV LPD 45 lbs   x10 NV CC 55#  x20    Belly press isometrics  10x5" 5"x15          Ther Activity             UBE     2/2 3'/3' 3'/3' 3/3 3'/3'                 Gait Training                                       Modalities             Cp prn

## 2022-07-28 ENCOUNTER — OFFICE VISIT (OUTPATIENT)
Dept: PHYSICAL THERAPY | Facility: CLINIC | Age: 67
End: 2022-07-28
Payer: MEDICARE

## 2022-07-28 DIAGNOSIS — G89.29 CHRONIC LEFT SHOULDER PAIN: Primary | ICD-10-CM

## 2022-07-28 DIAGNOSIS — M25.512 CHRONIC LEFT SHOULDER PAIN: Primary | ICD-10-CM

## 2022-07-28 DIAGNOSIS — Z96.612 STATUS POST TOTAL REPLACEMENT OF LEFT SHOULDER: ICD-10-CM

## 2022-07-28 PROCEDURE — 97140 MANUAL THERAPY 1/> REGIONS: CPT

## 2022-07-28 PROCEDURE — 97110 THERAPEUTIC EXERCISES: CPT

## 2022-07-28 PROCEDURE — 97112 NEUROMUSCULAR REEDUCATION: CPT

## 2022-07-28 NOTE — PROGRESS NOTES
Daily Note     Today's date: 2022  Patient name: Salinas Briseno  : 1955  MRN: [id number]  Referring provider: Jocelyn Peters MD  Dx:   Encounter Diagnosis     ICD-10-CM    1  Chronic left shoulder pain  M25 512     G89 29    2  Status post total replacement of left shoulder  Z96 612                   Subjective: Pt reports after a couple days post PT his shld stiffness up and gives him pain  Pt reports he HEP is too painful and avoids his AROM ex's  Objective: See treatment diary below      Assessment: Tolerated treatment fair  Patient encouraged to con't AROM cane ex's at home  Trial of AAROM e'xs in supine and SL  Pt reports feeling good immediately following PT but stiffness up later  Pt would benefit from cont'd PT to work on UE strengthening and postural strengthening  Plan: Continue per plan of care  Progress treatment as tolerated  Precautions: s/p L TSA  Other factors: FALLS RISK  Pt goals:  EPOC: 6/10/22  F/u with referring:      HEP:  Access Code: [email or web address]  URL: [email or web address]  Date: 2022  Prepared by: Tanisha Shetty    Exercises  · Supine Shoulder External Rotation with Dowel - 20 reps  · Supine Shoulder Flexion Extension AAROM with Dowel - 20 reps  · Standing Shoulder Abduction AAROM with Dowel - 20 reps            Manuals    L shoulder PROM WE PF WE WE PF WE WE PF WE JK   L UT and cervical STM WE PF WE WE PF WE WE PF WE JK   Rhythmic stab WE    PF WE WE PF WE    L shoulder/bicep IASTM    WE    PF WE    Re-assess        PF      25' 35' 25' 30' 25'     15'   Neuro Re-Ed             scap retraction    20 20x 20x 20x   Prone 20x5"   Prone squeeze             AA shld flex          10x2                                                       Ther Ex             pendulums 20x            pulleys 3'/3'  3'/3' 3'/3'  3'/3'    3'/3'   Dowel sh' flex             Dowel sh' ER             Dowel sh' abd Sh' iso flex/ext, abd/add, er/ir  10x5" 5"x20 ea  manual 5"x10 ea manual  5"x20 ea       SA Wall slides         x20    arom sh' flex   AA  x10 AA  x10 10x x15 x10 Supine 20x with SA activaiton x20 AA supine 10x2   arom sh' ext      x15 x10      arom sh' ER/IR   AA  x10 AA  x10 Pch 2x10 manual resisted  Tband GTB 20x ea GTB  x20 Supine GTB 20   arom sh' scap     Pch 2x10  x10      arom sh' abd   AA x10 AA  x10 2x10  x10   PTB supine 20x   Bent over Rows        STD Tband GTB 20x      SL ER    x10  x20 x20      SL ABD and H ABD      x20 x20   AA scap 10x2   TB Rows     Machine 45 lbs   NV Machine 45 lbs  x10 NV CC 55#  x20 CC 55#  x20   TB Ext     LPD 45 lbs   NV LPD 45 lbs   x10 NV CC 55#  x20 CC 55#  x20   Belly press isometrics  10x5" 5"x15          Ther Activity             UBE     2/2 3'/3' 3'/3' 3/3 3'/3' 3'/3'                Gait Training                                       Modalities             Cp prn

## 2022-07-29 ENCOUNTER — TELEPHONE (OUTPATIENT)
Dept: PSYCHIATRY | Facility: CLINIC | Age: 67
End: 2022-07-29

## 2022-07-29 ENCOUNTER — CONSULT (OUTPATIENT)
Dept: PAIN MEDICINE | Facility: CLINIC | Age: 67
End: 2022-07-29
Payer: MEDICARE

## 2022-07-29 VITALS
TEMPERATURE: 97.9 F | HEIGHT: 70 IN | HEART RATE: 77 BPM | DIASTOLIC BLOOD PRESSURE: 68 MMHG | WEIGHT: 209 LBS | SYSTOLIC BLOOD PRESSURE: 140 MMHG | BODY MASS INDEX: 29.92 KG/M2

## 2022-07-29 DIAGNOSIS — K76.9 LIVER DISEASE: ICD-10-CM

## 2022-07-29 DIAGNOSIS — M47.816 LUMBAR SPONDYLOSIS: ICD-10-CM

## 2022-07-29 DIAGNOSIS — G89.4 CHRONIC PAIN SYNDROME: Primary | ICD-10-CM

## 2022-07-29 DIAGNOSIS — M54.16 LUMBAR RADICULOPATHY: ICD-10-CM

## 2022-07-29 PROBLEM — E11.42 DIABETIC PERIPHERAL NEUROPATHY (HCC): Status: ACTIVE | Noted: 2022-07-29

## 2022-07-29 PROBLEM — J41.1 BRONCHITIS, MUCOPURULENT RECURRENT (HCC): Status: ACTIVE | Noted: 2022-07-29

## 2022-07-29 PROBLEM — E11.9 DM (DIABETES MELLITUS) (HCC): Status: ACTIVE | Noted: 2021-07-30

## 2022-07-29 PROBLEM — M51.27 HERNIATED NUCLEUS PULPOSUS OF LUMBOSACRAL REGION: Status: ACTIVE | Noted: 2022-07-29

## 2022-07-29 PROBLEM — M75.01 BILATERAL ADHESIVE CAPSULITIS OF SHOULDERS: Status: ACTIVE | Noted: 2021-07-30

## 2022-07-29 PROBLEM — E78.00 HYPERCHOLESTEROLEMIA: Status: ACTIVE | Noted: 2021-07-30

## 2022-07-29 PROBLEM — F13.20 BENZODIAZEPINE DEPENDENCE (HCC): Status: ACTIVE | Noted: 2021-07-30

## 2022-07-29 PROBLEM — M75.02 BILATERAL ADHESIVE CAPSULITIS OF SHOULDERS: Status: ACTIVE | Noted: 2021-07-30

## 2022-07-29 PROBLEM — C73 THYROID CANCER (HCC): Status: ACTIVE | Noted: 2021-08-19

## 2022-07-29 PROBLEM — K70.30 CIRRHOSIS, ALCOHOLIC (HCC): Status: ACTIVE | Noted: 2022-07-29

## 2022-07-29 PROBLEM — F10.21 ALCOHOLISM IN REMISSION (HCC): Status: ACTIVE | Noted: 2021-07-30

## 2022-07-29 PROCEDURE — 99204 OFFICE O/P NEW MOD 45 MIN: CPT | Performed by: ANESTHESIOLOGY

## 2022-07-29 NOTE — TELEPHONE ENCOUNTER
Spoke to Patient in regards to referral  Patient stated he is not interested at this time,he has services through [facility]

## 2022-07-29 NOTE — PROGRESS NOTES
Assessment  1  Chronic pain syndrome    2  Lumbar spondylosis    3  Liver disease    4  Lumbar radiculopathy        Plan      I believe that the patient would be a good candidate for spinal cord stimulation given the recalcitrant nature of his pain, has tried and failed multiple conservative measures including physical therapy aquatherapy and injections, and does not want to pursue invasive surgery  I had a long and detailed conversation with Chitra Worrell regarding the nature of spinal cord stimulator, the nature of the trial and reasonable expected outcomes  I provided information for home review  He also understands that there would be a three to five day trial process and the lead would be placed under fluoroscopic guidance  During that time, I would track the medication usage and sleep to help make decisions regarding permanent implantation, if we got to that point  I am also setting him up for an education seminar regarding spinal cord stimulation  Complete risks and benefits of the procedures were all discussed with the patient  I did obtain authorization from medical records to begin the insurance authorization process  He also understands that he will need to undergo psychological testing, which is standard care prior to any implant, to rule out any significant psychological co-morbidities that would interfere with him benefiting from the technology  However prior to the procedure will proceed with one diagnostic medial branch blocks determine if some of his pain may be facet in origin  If he obtained relief using double block paradigm, then I would entertain radiofrequency of the block nerves  I did speak with Neurosurgery who considered spinal surgery and he sees no contraindication in proceeding with the spinal cord stimulation trial     My impressions and treatment recommendations were discussed in detail with the patient who verbalized understanding and had no further questions  Discharge instructions were provided  I personally saw and examined the patient and I agree with the above discussed plan of care  This note is created using dictation transcription  It may contain typographical errors, grammatical errors, improperly dictated words, background noise and other errors  Orders Placed This Encounter   Procedures    X-ray thoracic spine 2 views     Standing Status:   Future     Standing Expiration Date:   7/29/2026     Scheduling Instructions:      Bring along any outside films relating to this procedure   MRI thoracic spine without contrast     Standing Status:   Future     Standing Expiration Date:   7/29/2026     Scheduling Instructions: There is no preparation for this test  Please leave your jewelry and valuables at home, wedding rings are the exception  Magnetic nail polish must be removed prior to arrival for your test  Please bring your insurance cards, a form of photo ID and a list of your medications with you  Arrive 15 minutes prior to your appointment time in order to register  Please bring any prior CT or MRI studies of this area that were not performed at a [facility] facility  To schedule this appointment, please contact Central Scheduling at [phone]  Prior to your appointment, please make sure you complete the MRI Screening Form when you e-Check in for your appointment  This will be available starting 7 days before your appointment in [address]  You may receive an e-mail with an activation code if you do not have a [employer] account  If you do not have access to a device, we will complete your screening at your appointment  Order Specific Question:   What is the patient's sedation requirement? Answer:   No Sedation     Order Specific Question:   Release to patient through [employer]     Answer:   Immediate     Order Specific Question:   Is order priority selected as STAT?      Answer:   No     Order Specific Question: Reason for Exam (FREE TEXT)     Answer:   pre op    X-ray lumbar spine complete 4+ views     Standing Status:   Future     Standing Expiration Date:   7/29/2026     Scheduling Instructions:      Bring along any outside films relating to this procedure   FL spine and pain procedure     Standing Status:   Future     Standing Expiration Date:   7/29/2026     Order Specific Question:   Reason for Exam:     Answer:   Bilateral L3, L4, L5 medial branch with 0 25% bupivacaine     Order Specific Question:   Anticoagulant hold needed? Answer:   no    CBC and Platelet     Standing Status:   Future     Standing Expiration Date:   7/29/2023    Protime-INR     Standing Status:   Future     Standing Expiration Date:   7/29/2023    APTT     Standing Status:   Future     Standing Expiration Date:   7/29/2023    Ambulatory Referral to Psychiatry     Standing Status:   Future     Standing Expiration Date:   7/29/2023     Referral Priority:   Routine     Referral Type:   Consult - AMB     Referral Reason:   Specialty Services Required     Requested Specialty:   Psychiatry     Number of Visits Requested:   1     Expiration Date:   7/29/2023       Referred By: Self  History of Present Illness    Nawaf Michelle is a 74 y o  male who presents for 2nd opinion regarding his chronic low back and lower extremity pain  Patient was seen pain management, underwent epidural steroid injections and intra-articular facet joint injections without sustained relief  Went for surgical evaluation however the patient does not want to proceed with surgical intervention  He is undergone aquatherapy and physical therapy but his pain persists and significant interfering with daily living activities  He does not like taking medications for pain and failed a trial of gabapentin  He reports 15 years of back pain is severe 9/10 on the visual analog scale is constant worse in the evening and afternoon    Describes cramping shooting with a numbing sensation subjective weakness of her lower limbs and uses a cane for ambulation  Walking standing bending all increases symptoms  He is a history of liver disease and diabetes mellitus  I have personally reviewed and/or updated the patient's past medical history, past surgical history, family history, social history, current medications, allergies, and vital signs today  Review of Systems   Constitutional: Positive for unexpected weight change  Respiratory: Negative for shortness of breath  Cardiovascular: Negative for chest pain  Gastrointestinal: Positive for nausea  Negative for constipation, diarrhea and vomiting  Genitourinary: Positive for frequency  Musculoskeletal: Positive for arthralgias and myalgias  Negative for gait problem and joint swelling  Skin: Negative for rash  Neurological: Negative for dizziness, seizures and weakness  Psychiatric/Behavioral: Positive for dysphoric mood  All other systems reviewed and are negative  Patient Active Problem List   Diagnosis    [facility]    Benzodiazepine dependence [facility]    Bilateral adhesive capsulitis of shoulders    Bronchitis, mucopurulent recurrent (HCC)    Cirrhosis, alcoholic ([address] )    Diabetic peripheral neuropathy [facility]    DM (diabetes mellitus) ([facility]    Herniated nucleus pulposus of lumbosacral region    Hypercholesterolemia    Lumbar spondylosis    Thyroid cancer ([address] )       Past Medical History:   Diagnosis Date    Anxiety     Arthritis     Cancer ([address]     Depression     Diabetes mellitus ([address]     Hypertension     Liver disease     Mitral valve prolapse     Thyroid disease        Past Surgical History:   Procedure Laterality Date    JOINT REPLACEMENT Left 03/11/2022    Left TSA    THYROID SURGERY  2021    remove cancer       History reviewed  No pertinent family history      Social History     Occupational History    Not on file   Tobacco Use    Smoking status: Former Smoker     Packs/day: 0 25    Smokeless tobacco: Never Used    Tobacco comment: quit 1981   Substance and Sexual Activity    Alcohol use: Not Currently     Comment: has not drank since january 2022    Drug use: Never    Sexual activity: Not on file       Current Outpatient Medications on File Prior to Visit   Medication Sig    ACCU-CHEK FABIANO PLUS test strip 4 (four) times a day    ACCU-CHEK FASTCLIX LANCETS MISC 4 (four) times a day    ADMELOG 100 UNIT/ML injection inject 25 UNITS SUBCUTANEOUSLY DAILY    cetirizine (ZyrTEC) 10 mg tablet Take 10 mg by mouth daily    clonazePAM (KlonoPIN) 1 mg tablet TAKE 1 TABLET BY MOUTH IN THE MORNING AND TAKE 2 TABLETS BY MOUTH AT BEDTIME    cyclobenzaprine (FLEXERIL) 10 mg tablet Take 10 mg by mouth 3 (three) times a day    DULoxetine (CYMBALTA) 20 mg capsule Take 20 mg by mouth daily    fluticasone (FLONASE) 50 mcg/act nasal spray 2 sprays daily As directed    folic acid (FOLVITE) 1 mg tablet Take 2,000 mcg by mouth daily    glipiZIDE (GLUCOTROL XL) 10 mg 24 hr tablet Take 10 mg by mouth 2 (two) times a day    GLOBAL INJECT EASE INSULIN SYR 31G X 5/16" 1 ML MISC 2 (two) times a day    GNP ASPIRIN LOW DOSE 81 MG EC tablet Take 81 mg by mouth daily    HUMULIN N 100 UNIT/ML subcutaneous injection INJECT 40 UNITS IN THE MORNING AND 6 UNITS in THE IN THE EVENING AS DIRECTED    ibuprofen (MOTRIN) 400 mg tablet Take 800 mg by mouth 3 (three) times a day    ketoconazole (NIZORAL) 2 % shampoo daily Use as directed    losartan-hydrochlorothiazide (HYZAAR) 100-25 MG per tablet Take 1 tablet by mouth daily    lovastatin (MEVACOR) 20 mg tablet Take 20 mg by mouth daily    metFORMIN (GLUCOPHAGE) 1000 MG tablet     mirtazapine (REMERON) 45 MG tablet Take 45 mg by mouth daily at bedtime    NIFEdipine (PROCARDIA XL) 90 mg 24 hr tablet Take 90 mg by mouth daily    propranolol (INDERAL LA) 160 mg Take 160 mg by mouth daily    ARIPiprazole (ABILIFY) 30 mg tablet Take 30 mg by mouth daily at bedtime (Patient not taking: Reported on 7/29/2022)    DIGOX 250 MCG tablet TAKE 1 TABLET BY MOUTH EVERY DAY BUT DO NOT TAKE ON SUNDAY OR WEDNESDAY (Patient not taking: Reported on 7/29/2022)    naproxen (NAPROSYN) 500 mg tablet Take 1 tablet (500 mg total) by mouth 2 (two) times a day with meals (Patient not taking: Reported on 7/29/2022)    ondansetron (ZOFRAN) 4 mg tablet Take 1 tablet (4 mg total) by mouth every 6 (six) hours as needed for nausea or vomiting (Patient not taking: Reported on 7/29/2022)     No current facility-administered medications on file prior to visit  Allergies   Allergen Reactions    Cephalexin Rash    Abilify [Aripiprazole] Other (See Comments)     Shaking      Molds & Smuts        Physical Exam    /68 (BP Location: Left arm, Patient Position: Sitting, Cuff Size: Standard)   Pulse 77   Temp 97 9 °F (36 6 °C)   Ht 5' 10" (1 778 m)   Wt 94 8 kg (209 lb)   BMI 29 99 kg/m²     Constitutional: normal, well developed, well nourished, alert, in no distress and non-toxic and no overt pain behavior  and overweight  Eyes: anicteric  HEENT: grossly intact  Neck: supple, symmetric, trachea midline and no masses   Pulmonary:even and unlabored  Cardiovascular:No edema or pitting edema present  Skin:Normal without rashes or lesions and well hydrated  Psychiatric:Mood and affect appropriate  Neurologic:Cranial Nerves II-XII grossly intact  Musculoskeletal:normal and ambulates with cane, he is tenderness along the lumbar paraspinal region he has chronic left footdrop normal reflex    Imaging  MRI Cervical Spine @ [facility] 4-12-22  CONCLUSION:  1  Multilevel degenerative disc disease  2  Disc bulging at C3-4, C4-5, C5-6, and C6-7   3  Small broad-based disc herniation T1-2    Xray Cervical Spine @ [facility] 3-29-22  IMPRESSION:   1  No evidence of fractures or malalignment     2  Moderate to severe degenerative changes at C5-C6 resulting in moderate bilateral neural foraminal narrowing  Xray Lt Shoulder @ [facility] 3-29-22  IMPRESSION: Left shoulder replacement with no evidence of hardware failure or loosening       Rt Hip Xray @ [facility] 3-8-22  Impression: X-rays were reviewed with Dr Michael Coronado and show bilateral arthritic changes of the hips  No fractures  No lytic or blastic lesions  MRI Shoulder LT @ [facility] 8-28-21  IMPRESSION:  1  No full-thickness rotator cuff tear demonstrated  2  Articular sided partial thickness tear of peripheral supraspinatus tendon  3  Osteoarthritis of the glenohumeral joint with posterior labral tear  MRI Lumbar Spine @ [facility] 3-16-21  IMPRESSION:   1   Multiple levels of the central canal and neuroforaminal narrowing as described above, most pronounced at L3-L4, L4-L5 and L5-S1 levels  Asymmetric to the left small central disc herniation with extrusion at L4-L5  Multilevel lateral recess effacement with abutment of the traversing nerve roots at multiple levels  2   Multilevel Modic type I and type II endplate changes with multilevel Schmorl's node formation  3   Additional findings by  Xray Lumbar Spine and Xray Pelvis  X-rays of the lumbar spine and pelvis were ordered, obtained, and reviewed which reveal severe degenerative disc disease of the spine  No other osseous abnormalities or lesions were noted  X-rays of the hip(s) were ordered, obtained and reviewed that show mild arthritis with some joint space narrowing  No other osseous lesions noted  I have personally reviewed pertinent films in PACS and my interpretation is Multilevel lumbar spondylosis with disc herniation and foraminal narrowing

## 2022-08-02 ENCOUNTER — OFFICE VISIT (OUTPATIENT)
Dept: PHYSICAL THERAPY | Facility: CLINIC | Age: 67
End: 2022-08-02
Payer: MEDICARE

## 2022-08-02 DIAGNOSIS — G89.29 CHRONIC LEFT SHOULDER PAIN: Primary | ICD-10-CM

## 2022-08-02 DIAGNOSIS — Z96.612 STATUS POST TOTAL REPLACEMENT OF LEFT SHOULDER: ICD-10-CM

## 2022-08-02 DIAGNOSIS — M25.512 CHRONIC LEFT SHOULDER PAIN: Primary | ICD-10-CM

## 2022-08-02 PROCEDURE — 97140 MANUAL THERAPY 1/> REGIONS: CPT | Performed by: PHYSICAL THERAPIST

## 2022-08-02 PROCEDURE — 97110 THERAPEUTIC EXERCISES: CPT | Performed by: PHYSICAL THERAPIST

## 2022-08-02 NOTE — PROGRESS NOTES
Daily Note     Today's date: 2022  Patient name: Shimon Jeffrey  : 1955  MRN: [id number]  Referring provider: Brett Chase MD  Dx:   Encounter Diagnosis     ICD-10-CM    1  Chronic left shoulder pain  M25 512     G89 29    2  Status post total replacement of left shoulder  Z96 612                   Subjective: Notes feeling okay, still having good days a bad days with his shoulder  Objective: See treatment diary below      Assessment: Continues to have limited ability to perform OH lifting and reaching  Noting poor scapulohumeral dissociation  Still with excessive UT elevation and pinning of scap with elevation  With manual pinning to lateral border and elevation able to facilitate performance of flexion with less discomfort  Plan: Continue per plan of care  Focus on supine flexion  Precautions: s/p L TSA  Other factors: FALLS RISK  Pt goals:  EPOC: 6/10/22  F/u with referring:      HEP:  Access Code: [email or web address]  URL: [email or web address]  Date: 2022  Prepared by: Delfina Lunsford    Exercises  · Supine Shoulder External Rotation with Dowel - 20 reps  · Supine Shoulder Flexion Extension AAROM with Dowel - 20 reps  · Standing Shoulder Abduction AAROM with Dowel - 20 reps            Manuals    L shoulder PROM PF PF WE WE PF WE WE PF WE JK   L UT and cervical STM PF PF WE WE PF WE WE PF WE JK   Rhythmic stab PF    PF WE WE PF WE    L shoulder/bicep IASTM    WE    PF WE    Re-assess        PF      30' 35' 25' 30' 25'     15'   Neuro Re-Ed             scap retraction    20 20x 20x 20x   Prone 20x5"   Prone squeeze             AA shld flex Supine 10x2         10x2                                                       Ther Ex             pendulums             pulleys 3'/3'  3'/3' 3'/3'  3'/3'    3'/3'   Dowel sh' flex             Dowel sh' ER             Dowel sh' abd             Sh' iso flex/ext, abd/add, er/ir  10x5" 5"x20 ea  manual 5"x10 ea manual  5"x20 ea       SA Wall slides 10x with AA        x20    arom sh' flex Seated 10x  AA  x10 AA  x10 10x x15 x10 Supine 20x with SA activaiton x20 AA supine 10x2   arom sh' ext      x15 x10      arom sh' ER/IR   AA  x10 AA  x10 Pch 2x10 manual resisted  Tband GTB 20x ea GTB  x20 Supine GTB 20   arom sh' scap     Pch 2x10  x10      arom sh' abd   AA x10 AA  x10 2x10  x10   PTB supine 20x   Bent over Rows        STD Tband GTB 20x      SL ER    x10  x20 x20      SL ABD and H ABD      x20 x20   AA scap 10x2   TB Rows     Machine 45 lbs   NV Machine 45 lbs  x10 NV CC 55#  x20 CC 55#  x20   TB Ext     LPD 45 lbs   NV LPD 45 lbs   x10 NV CC 55#  x20 CC 55#  x20   Belly press isometrics  10x5" 5"x15          Ther Activity             UBE     2/2 3'/3' 3'/3' 3/3 3'/3' 3'/3'                Gait Training                                       Modalities             Cp prn

## 2022-08-03 ENCOUNTER — TELEPHONE (OUTPATIENT)
Dept: PAIN MEDICINE | Facility: CLINIC | Age: 67
End: 2022-08-03

## 2022-08-03 NOTE — TELEPHONE ENCOUNTER
Pt has procedure on 8/9/22 and he is in tremendous pain that ibuprofen 800 mg and 3 extra strength tylenol is not helping the pain  Pt pain level 10+/10  Pt ask if you can please call him in something a little stronger      Pt # [phone]

## 2022-08-04 ENCOUNTER — OFFICE VISIT (OUTPATIENT)
Dept: PHYSICAL THERAPY | Facility: CLINIC | Age: 67
End: 2022-08-04
Payer: MEDICARE

## 2022-08-04 DIAGNOSIS — M25.512 CHRONIC LEFT SHOULDER PAIN: Primary | ICD-10-CM

## 2022-08-04 DIAGNOSIS — G89.29 CHRONIC LEFT SHOULDER PAIN: Primary | ICD-10-CM

## 2022-08-04 DIAGNOSIS — Z96.612 STATUS POST TOTAL REPLACEMENT OF LEFT SHOULDER: ICD-10-CM

## 2022-08-04 PROCEDURE — 97140 MANUAL THERAPY 1/> REGIONS: CPT

## 2022-08-04 PROCEDURE — 97110 THERAPEUTIC EXERCISES: CPT

## 2022-08-04 NOTE — PROGRESS NOTES
Daily Note     Today's date: 2022  Patient name: Steven Olivas  : 1955  MRN: [id number]  Referring provider: Derrell Urias MD  Dx:   Encounter Diagnosis     ICD-10-CM    1  Chronic left shoulder pain  M25 512     G89 29    2  Status post total replacement of left shoulder  Z96 612                   Subjective: Pt reports his shoulder has been feeling much better the last week or two, feels he has turned a corner with his shoulder  Objective: See treatment diary below      Assessment: Since significant focus was placed on Lat stretching LV his shoulder complex has been moving more symmetrical  Again stretched Lat with shoulder flexion and his Active supine shoulder flexion mechanics improved  With active shoulder movements he worked until the point of fatigue rather than rep count  Pt showing overall improvement  Plan: Continue per plan of care  Focus on supine flexion  Precautions: s/p L TSA  Other factors: FALLS RISK  Pt goals:  EPOC: 6/10/22  F/u with referring:      HEP:  Access Code: [email or web address]  URL: [email or web address]  Date: 2022  Prepared by: Theodora Bevel    Exercises  · Supine Shoulder External Rotation with Dowel - 20 reps  · Supine Shoulder Flexion Extension AAROM with Dowel - 20 reps  · Standing Shoulder Abduction AAROM with Dowel - 20 reps            Manuals    L shoulder PROM PF WE       WE JK   L UT and cervical STM PF WE       WE JK   Rhythmic stab PF WE       WE    L shoulder/bicep IASTM         WE    Re-assess              27' 25'        15'   Neuro Re-Ed             scap retraction          Prone 20x5"   Prone squeeze             AA shld flex Supine 10x2 AROM  Supine  x30         10x2                                                       Ther Ex             pendulums             pulleys 3'/3' 3'/3'        3'/3'   Dowel sh' flex             Dowel sh' ER             Dowel sh' abd             Sh' iso flex/ext, abd/add, er/ir SA Wall slides 10x with AA        x20    arom sh' flex Seated 10x Seated 10x       x20 AA supine 10x2   arom sh' ext             arom sh' ER/IR         GTB  x20 Supine GTB 20   arom sh' scap             arom sh' abd          PTB supine 20x   Bent over Rows  7 5#  KB  x30           Bent Over Ext  5#  KB  x30           SL ER  x60           SL ABD and H ABD  x30        AA scap 10x2   TB Rows         CC 55#  x20 CC 55#  x20   TB Ext         CC 55#  x20 CC 55#  x20   Belly press isometrics             Ther Activity             UBE         3'/3' 3'/3'                Gait Training                                       Modalities             Cp prn

## 2022-08-04 NOTE — TELEPHONE ENCOUNTER
S/w pt, stated that his pain is significant, no relief with nsaids or tylenol  Stated that it takes 45 min to get out of bed in the morning  Pt confirmed gabapentin in the past with no improvement - stated that it was a low dose but cannot recall the dose  Pt stated that he also took cymbalta 60 mg with no improvement  Pt denied any se's or issues with either medication  Pt stated that gabapentin and cymbalta were prescribed for depression and pain  Advised pt, the writer will d/w Dr Patito Thompson, confirmed 8/9 mbb appt and the pt is pursuing scs trial  Advised pt, the writer will d/w Dr Patito Thompson and cb to advise  Pt verbalized understanding and appreciation

## 2022-08-05 NOTE — TELEPHONE ENCOUNTER
Unfortunately he has tried and failed most medications , we will not prescribe opioids, we czn try the proceduresas scheduled

## 2022-08-09 ENCOUNTER — APPOINTMENT (EMERGENCY)
Dept: RADIOLOGY | Facility: HOSPITAL | Age: 67
DRG: 854 | End: 2022-08-09
Payer: MEDICARE

## 2022-08-09 ENCOUNTER — HOSPITAL ENCOUNTER (OUTPATIENT)
Dept: RADIOLOGY | Facility: CLINIC | Age: 67
Discharge: HOME | DRG: 854 | End: 2022-08-09
Admitting: ANESTHESIOLOGY
Payer: MEDICARE

## 2022-08-09 ENCOUNTER — HOSPITAL ENCOUNTER (INPATIENT)
Facility: HOSPITAL | Age: 67
LOS: 6 days | Discharge: HOME HEALTH | DRG: 854 | End: 2022-08-15
Attending: EMERGENCY MEDICINE | Admitting: INTERNAL MEDICINE
Payer: MEDICARE

## 2022-08-09 VITALS
RESPIRATION RATE: 20 BRPM | SYSTOLIC BLOOD PRESSURE: 114 MMHG | DIASTOLIC BLOOD PRESSURE: 71 MMHG | TEMPERATURE: 98 F | OXYGEN SATURATION: 96 % | HEART RATE: 85 BPM

## 2022-08-09 DIAGNOSIS — L03.116 CELLULITIS OF LEFT FOOT: ICD-10-CM

## 2022-08-09 DIAGNOSIS — E11.621 DIABETIC FOOT ULCER (HCC): Primary | ICD-10-CM

## 2022-08-09 DIAGNOSIS — M47.816 LUMBAR SPONDYLOSIS: ICD-10-CM

## 2022-08-09 DIAGNOSIS — E11.621 DIABETIC ULCER OF LEFT MIDFOOT ASSOCIATED WITH TYPE 2 DIABETES MELLITUS, WITH OTHER ULCER SEVERITY (HCC): ICD-10-CM

## 2022-08-09 DIAGNOSIS — K59.00 CONSTIPATION: ICD-10-CM

## 2022-08-09 DIAGNOSIS — L97.509 DIABETIC FOOT ULCER (HCC): Primary | ICD-10-CM

## 2022-08-09 DIAGNOSIS — L97.428 DIABETIC ULCER OF LEFT MIDFOOT ASSOCIATED WITH TYPE 2 DIABETES MELLITUS, WITH OTHER ULCER SEVERITY (HCC): ICD-10-CM

## 2022-08-09 PROBLEM — A41.9 SEPSIS (HCC): Status: ACTIVE | Noted: 2022-08-09

## 2022-08-09 PROBLEM — L97.529 DIABETIC ULCER OF LEFT FOOT ASSOCIATED WITH TYPE 2 DIABETES MELLITUS (HCC): Status: ACTIVE | Noted: 2022-08-09

## 2022-08-09 PROBLEM — I16.0 HYPERTENSIVE URGENCY: Status: ACTIVE | Noted: 2022-08-09

## 2022-08-09 PROBLEM — I10 ESSENTIAL HYPERTENSION: Status: ACTIVE | Noted: 2022-08-09

## 2022-08-09 LAB
ALBUMIN SERPL BCP-MCNC: 3.8 G/DL (ref 3.5–5)
ALP SERPL-CCNC: 107 U/L (ref 46–116)
ALT SERPL W P-5'-P-CCNC: 15 U/L (ref 12–78)
ANION GAP SERPL CALCULATED.3IONS-SCNC: 10 MMOL/L (ref 4–13)
AST SERPL W P-5'-P-CCNC: 25 U/L (ref 5–45)
BASOPHILS # BLD AUTO: 0.08 THOUSANDS/ΜL (ref 0–0.1)
BASOPHILS NFR BLD AUTO: 1 % (ref 0–1)
BILIRUB SERPL-MCNC: 0.5 MG/DL (ref 0.2–1)
BUN SERPL-MCNC: 7 MG/DL (ref 5–25)
CALCIUM SERPL-MCNC: 9.5 MG/DL (ref 8.3–10.1)
CHLORIDE SERPL-SCNC: 93 MMOL/L (ref 96–108)
CO2 SERPL-SCNC: 33 MMOL/L (ref 21–32)
CREAT SERPL-MCNC: 0.91 MG/DL (ref 0.6–1.3)
CRP SERPL QL: 88.6 MG/L
EOSINOPHIL # BLD AUTO: 0.29 THOUSAND/ΜL (ref 0–0.61)
EOSINOPHIL NFR BLD AUTO: 2 % (ref 0–6)
ERYTHROCYTE [DISTWIDTH] IN BLOOD BY AUTOMATED COUNT: 14.9 % (ref 11.6–15.1)
ERYTHROCYTE [SEDIMENTATION RATE] IN BLOOD: 83 MM/HOUR (ref 0–19)
GFR SERPL CREATININE-BSD FRML MDRD: 88 ML/MIN/1.73SQ M
GLUCOSE SERPL-MCNC: 100 MG/DL (ref 65–140)
HCT VFR BLD AUTO: 43.7 % (ref 36.5–49.3)
HGB BLD-MCNC: 13.8 G/DL (ref 12–17)
IMM GRANULOCYTES # BLD AUTO: 0.06 THOUSAND/UL (ref 0–0.2)
IMM GRANULOCYTES NFR BLD AUTO: 0 % (ref 0–2)
LACTATE SERPL-SCNC: 1.2 MMOL/L (ref 0.5–2)
LYMPHOCYTES # BLD AUTO: 2.12 THOUSANDS/ΜL (ref 0.6–4.47)
LYMPHOCYTES NFR BLD AUTO: 14 % (ref 14–44)
MCH RBC QN AUTO: 27.7 PG (ref 26.8–34.3)
MCHC RBC AUTO-ENTMCNC: 31.6 G/DL (ref 31.4–37.4)
MCV RBC AUTO: 88 FL (ref 82–98)
MONOCYTES # BLD AUTO: 1.01 THOUSAND/ΜL (ref 0.17–1.22)
MONOCYTES NFR BLD AUTO: 7 % (ref 4–12)
NEUTROPHILS # BLD AUTO: 11.69 THOUSANDS/ΜL (ref 1.85–7.62)
NEUTS SEG NFR BLD AUTO: 76 % (ref 43–75)
NRBC BLD AUTO-RTO: 0 /100 WBCS
PLATELET # BLD AUTO: 453 THOUSANDS/UL (ref 149–390)
PMV BLD AUTO: 10.1 FL (ref 8.9–12.7)
POTASSIUM SERPL-SCNC: 3.5 MMOL/L (ref 3.5–5.3)
PROCALCITONIN SERPL-MCNC: 0.09 NG/ML
PROT SERPL-MCNC: 8.9 G/DL (ref 6.4–8.4)
RBC # BLD AUTO: 4.99 MILLION/UL (ref 3.88–5.62)
SODIUM SERPL-SCNC: 136 MMOL/L (ref 135–147)
WBC # BLD AUTO: 15.25 THOUSAND/UL (ref 4.31–10.16)

## 2022-08-09 PROCEDURE — 64494 INJ PARAVERT F JNT L/S 2 LEV: CPT | Performed by: ANESTHESIOLOGY

## 2022-08-09 PROCEDURE — 99285 EMERGENCY DEPT VISIT HI MDM: CPT

## 2022-08-09 PROCEDURE — 86140 C-REACTIVE PROTEIN: CPT | Performed by: INTERNAL MEDICINE

## 2022-08-09 PROCEDURE — 85652 RBC SED RATE AUTOMATED: CPT | Performed by: INTERNAL MEDICINE

## 2022-08-09 PROCEDURE — 99223 1ST HOSP IP/OBS HIGH 75: CPT | Performed by: INTERNAL MEDICINE

## 2022-08-09 PROCEDURE — 36415 COLL VENOUS BLD VENIPUNCTURE: CPT

## 2022-08-09 PROCEDURE — 85025 COMPLETE CBC W/AUTO DIFF WBC: CPT

## 2022-08-09 PROCEDURE — 87040 BLOOD CULTURE FOR BACTERIA: CPT

## 2022-08-09 PROCEDURE — 99284 EMERGENCY DEPT VISIT MOD MDM: CPT

## 2022-08-09 PROCEDURE — 73630 X-RAY EXAM OF FOOT: CPT

## 2022-08-09 PROCEDURE — 84145 PROCALCITONIN (PCT): CPT

## 2022-08-09 PROCEDURE — 83605 ASSAY OF LACTIC ACID: CPT

## 2022-08-09 PROCEDURE — 64493 INJ PARAVERT F JNT L/S 1 LEV: CPT | Performed by: ANESTHESIOLOGY

## 2022-08-09 PROCEDURE — 80053 COMPREHEN METABOLIC PANEL: CPT

## 2022-08-09 RX ORDER — LORATADINE 10 MG/1
10 TABLET ORAL DAILY
Refills: 2 | Status: DISCONTINUED | OUTPATIENT
Start: 2022-08-10 | End: 2022-08-15 | Stop reason: HOSPADM

## 2022-08-09 RX ORDER — LOSARTAN POTASSIUM 50 MG/1
100 TABLET ORAL DAILY
Status: DISCONTINUED | OUTPATIENT
Start: 2022-08-10 | End: 2022-08-15 | Stop reason: HOSPADM

## 2022-08-09 RX ORDER — FOLIC ACID 1 MG/1
2 TABLET ORAL DAILY
Status: DISCONTINUED | OUTPATIENT
Start: 2022-08-10 | End: 2022-08-15 | Stop reason: HOSPADM

## 2022-08-09 RX ORDER — PROPRANOLOL HYDROCHLORIDE 80 MG/1
160 CAPSULE, EXTENDED RELEASE ORAL DAILY
Status: DISCONTINUED | OUTPATIENT
Start: 2022-08-10 | End: 2022-08-15 | Stop reason: HOSPADM

## 2022-08-09 RX ORDER — SERTRALINE HYDROCHLORIDE 25 MG/1
100 TABLET, FILM COATED ORAL DAILY
COMMUNITY

## 2022-08-09 RX ORDER — ASPIRIN 81 MG/1
81 TABLET ORAL DAILY
Refills: 0 | Status: DISCONTINUED | OUTPATIENT
Start: 2022-08-10 | End: 2022-08-15 | Stop reason: HOSPADM

## 2022-08-09 RX ORDER — CLONAZEPAM 1 MG/1
1 TABLET ORAL 2 TIMES DAILY PRN
Status: DISCONTINUED | OUTPATIENT
Start: 2022-08-09 | End: 2022-08-15 | Stop reason: HOSPADM

## 2022-08-09 RX ORDER — LIDOCAINE 50 MG/G
1 PATCH TOPICAL
Status: DISCONTINUED | OUTPATIENT
Start: 2022-08-09 | End: 2022-08-15 | Stop reason: HOSPADM

## 2022-08-09 RX ORDER — INSULIN LISPRO 100 [IU]/ML
37 INJECTION, SOLUTION INTRAVENOUS; SUBCUTANEOUS 2 TIMES DAILY WITH MEALS
Status: DISCONTINUED | OUTPATIENT
Start: 2022-08-10 | End: 2022-08-10

## 2022-08-09 RX ORDER — NIFEDIPINE 30 MG/1
120 TABLET, EXTENDED RELEASE ORAL DAILY
Status: DISCONTINUED | OUTPATIENT
Start: 2022-08-10 | End: 2022-08-15 | Stop reason: HOSPADM

## 2022-08-09 RX ORDER — ONDANSETRON 2 MG/ML
4 INJECTION INTRAMUSCULAR; INTRAVENOUS EVERY 6 HOURS PRN
Status: DISCONTINUED | OUTPATIENT
Start: 2022-08-09 | End: 2022-08-15 | Stop reason: HOSPADM

## 2022-08-09 RX ORDER — FLUTICASONE PROPIONATE 50 MCG
2 SPRAY, SUSPENSION (ML) NASAL DAILY
Status: DISCONTINUED | OUTPATIENT
Start: 2022-08-10 | End: 2022-08-15 | Stop reason: HOSPADM

## 2022-08-09 RX ORDER — HYDROCHLOROTHIAZIDE 25 MG/1
25 TABLET ORAL DAILY
Status: DISCONTINUED | OUTPATIENT
Start: 2022-08-10 | End: 2022-08-14

## 2022-08-09 RX ORDER — BUPIVACAINE HCL/PF 2.5 MG/ML
10 VIAL (ML) INJECTION ONCE
Status: COMPLETED | OUTPATIENT
Start: 2022-08-09 | End: 2022-08-09

## 2022-08-09 RX ORDER — DULOXETIN HYDROCHLORIDE 30 MG/1
60 CAPSULE, DELAYED RELEASE ORAL 2 TIMES DAILY
Status: DISCONTINUED | OUTPATIENT
Start: 2022-08-10 | End: 2022-08-15 | Stop reason: HOSPADM

## 2022-08-09 RX ORDER — NIFEDIPINE 30 MG/1
90 TABLET, EXTENDED RELEASE ORAL DAILY
Status: DISCONTINUED | OUTPATIENT
Start: 2022-08-10 | End: 2022-08-09

## 2022-08-09 RX ORDER — HEPARIN SODIUM 5000 [USP'U]/ML
5000 INJECTION, SOLUTION INTRAVENOUS; SUBCUTANEOUS EVERY 8 HOURS SCHEDULED
Status: DISCONTINUED | OUTPATIENT
Start: 2022-08-09 | End: 2022-08-15 | Stop reason: HOSPADM

## 2022-08-09 RX ORDER — CEFEPIME HYDROCHLORIDE 2 G/50ML
2000 INJECTION, SOLUTION INTRAVENOUS EVERY 12 HOURS
Status: DISCONTINUED | OUTPATIENT
Start: 2022-08-09 | End: 2022-08-15 | Stop reason: HOSPADM

## 2022-08-09 RX ORDER — CEFTRIAXONE 2 G/50ML
2000 INJECTION, SOLUTION INTRAVENOUS ONCE
Status: DISCONTINUED | OUTPATIENT
Start: 2022-08-09 | End: 2022-08-09

## 2022-08-09 RX ORDER — MIRTAZAPINE 15 MG/1
45 TABLET, FILM COATED ORAL
Status: DISCONTINUED | OUTPATIENT
Start: 2022-08-09 | End: 2022-08-15 | Stop reason: HOSPADM

## 2022-08-09 RX ORDER — PRAVASTATIN SODIUM 20 MG
20 TABLET ORAL
Refills: 3 | Status: DISCONTINUED | OUTPATIENT
Start: 2022-08-10 | End: 2022-08-15 | Stop reason: HOSPADM

## 2022-08-09 RX ORDER — INSULIN LISPRO 100 [IU]/ML
1-6 INJECTION, SOLUTION INTRAVENOUS; SUBCUTANEOUS
Status: DISCONTINUED | OUTPATIENT
Start: 2022-08-09 | End: 2022-08-15 | Stop reason: HOSPADM

## 2022-08-09 RX ORDER — ACETAMINOPHEN 325 MG/1
650 TABLET ORAL EVERY 6 HOURS PRN
Status: DISCONTINUED | OUTPATIENT
Start: 2022-08-09 | End: 2022-08-15 | Stop reason: HOSPADM

## 2022-08-09 RX ORDER — DULOXETIN HYDROCHLORIDE 60 MG/1
60 CAPSULE, DELAYED RELEASE ORAL 2 TIMES DAILY
COMMUNITY
Start: 2022-06-07

## 2022-08-09 RX ORDER — GLIPIZIDE 5 MG/1
10 TABLET, FILM COATED, EXTENDED RELEASE ORAL 2 TIMES DAILY
Status: DISCONTINUED | OUTPATIENT
Start: 2022-08-10 | End: 2022-08-10

## 2022-08-09 RX ORDER — INSULIN LISPRO 100 [IU]/ML
1-6 INJECTION, SOLUTION INTRAVENOUS; SUBCUTANEOUS
Status: DISCONTINUED | OUTPATIENT
Start: 2022-08-10 | End: 2022-08-15 | Stop reason: HOSPADM

## 2022-08-09 RX ORDER — CYCLOBENZAPRINE HCL 10 MG
10 TABLET ORAL
Status: DISCONTINUED | OUTPATIENT
Start: 2022-08-09 | End: 2022-08-15 | Stop reason: HOSPADM

## 2022-08-09 RX ORDER — NIFEDIPINE 30 MG/1
TABLET, EXTENDED RELEASE ORAL
COMMUNITY
Start: 2022-07-10

## 2022-08-09 RX ORDER — EMPAGLIFLOZIN 10 MG/1
10 TABLET, FILM COATED ORAL DAILY
COMMUNITY
Start: 2022-07-20

## 2022-08-09 RX ADMIN — CEFEPIME HYDROCHLORIDE 2000 MG: 2 INJECTION, SOLUTION INTRAVENOUS at 22:18

## 2022-08-09 RX ADMIN — BUPIVACAINE HYDROCHLORIDE 10 ML: 2.5 INJECTION, SOLUTION EPIDURAL; INFILTRATION; INTRACAUDAL at 09:39

## 2022-08-09 RX ADMIN — VANCOMYCIN HYDROCHLORIDE 1750 MG: 5 INJECTION, POWDER, LYOPHILIZED, FOR SOLUTION INTRAVENOUS at 23:02

## 2022-08-09 NOTE — ED PROVIDER NOTES
History  Chief Complaint   Patient presents with    Wound Infection     Pt reports being diabetic and two days ago noticed swelling and redness on a boil on his inner left foot  Denies fevers  75 y/o male with PMH DM, HTN, thyroid disease presents today for a wound on the bottom of his left foot  Patient states he has had a callus there for months and has been seeing podiatry every two weeks  States over the past 3 days the wound has opened slightly and he started to develop swelling and redness around the wound and radiating up to the top of his foot  States he saw podiatry today who advised him to go to ER for concern for soft tissue infection  Patient denies any pain because he has neuropathy in his foot  He denies purulent discharge, bleeding, red streaking, decreased ROM of foot, feves, chills, chest pain, shortness of breath  At home he has been doing ice, betadine and antibiotic ointment  Pt had prior surgery on the left foot when he was younger  Pt says he has an allergy to keflex but states he only has diarrhea      History provided by:  Patient   used: No    Foot Injury - Major  Location:  Foot  Injury: no    Foot location:  Sole of L foot  Pain details:     Severity:  No pain    Duration:  3 days    Progression:  Worsening  Ineffective treatments:  None tried  Associated symptoms: no fever        Prior to Admission Medications   Prescriptions Last Dose Informant Patient Reported? Taking?    ACCU-CHEK FABIANO PLUS test strip   Yes No   Si (four) times a day   ACCU-CHEK FASTCLIX LANCETS MISC   Yes No   Si (four) times a day   ADMELOG 100 UNIT/ML injection   Yes No   Si Units 2 (two) times a day with meals   DULoxetine (CYMBALTA) 60 mg delayed release capsule   Yes No   Sig: Take 60 mg by mouth 2 (two) times a day   GLOBAL INJECT EASE INSULIN SYR 31G X 5/16" 1 ML MISC   Yes No   Si (two) times a day   GNP ASPIRIN LOW DOSE 81 MG EC tablet   Yes No   Sig: Take 81 mg by mouth daily   Jardiance 10 MG TABS   Yes No   Sig: Take 10 mg by mouth daily   NIFEdipine (PROCARDIA XL) 30 mg 24 hr tablet   Yes No   Sig: TAKE 1 TABLET BY MOUTH DAILY in addition TO 90mg FOR A total OF 120mg DAILY   NIFEdipine (PROCARDIA XL) 90 mg 24 hr tablet   Yes No   Sig: Take 90 mg by mouth daily   cetirizine (ZyrTEC) 10 mg tablet   Yes No   Sig: Take 10 mg by mouth daily   clonazePAM (KlonoPIN) 1 mg tablet   Yes No   Si (two) times a day as needed for anxiety   cyclobenzaprine (FLEXERIL) 10 mg tablet   Yes No   Sig: Take 10 mg by mouth daily at bedtime as needed for muscle spasms   fluticasone (FLONASE) 50 mcg/act nasal spray   Yes No   Si sprays daily As directed   folic acid (FOLVITE) 1 mg tablet   Yes No   Sig: Take 2,000 mcg by mouth daily   glipiZIDE (GLUCOTROL XL) 10 mg 24 hr tablet   Yes No   Sig: Take 10 mg by mouth 2 (two) times a day   ibuprofen (MOTRIN) 400 mg tablet   Yes No   Sig: Take 800 mg by mouth 3 (three) times a day   ketoconazole (NIZORAL) 2 % shampoo   Yes No   Sig: daily Use as directed   losartan-hydrochlorothiazide (HYZAAR) 100-25 MG per tablet   Yes No   Sig: Take 1 tablet by mouth daily   lovastatin (MEVACOR) 20 mg tablet   Yes No   Sig: Take 20 mg by mouth daily   metFORMIN (GLUCOPHAGE) 1000 MG tablet   Yes No   mirtazapine (REMERON) 45 MG tablet   Yes No   Sig: Take 45 mg by mouth daily at bedtime   propranolol (INDERAL LA) 160 mg   Yes No   Sig: Take 160 mg by mouth daily   sertraline (ZOLOFT) 25 mg tablet   Yes Yes   Sig: Take 75 mg by mouth daily      Facility-Administered Medications: None       Past Medical History:   Diagnosis Date    Anxiety     Arthritis     Cancer [facility]     Depression     [facility]     Hypertension     Liver disease     Mitral valve prolapse     Thyroid disease        Past Surgical History:   Procedure Laterality Date    JOINT REPLACEMENT Left 2022    Left TSA    THYROID SURGERY      remove cancer       History reviewed  No pertinent family history  I have reviewed and agree with the history as documented  E-Cigarette/Vaping    E-Cigarette Use Never User      E-Cigarette/Vaping Substances     Social History     Tobacco Use    Smoking status: Former Smoker     Packs/day: 0 25    Smokeless tobacco: Never Used    Tobacco comment: quit 1981   Vaping Use    Vaping Use: Never used   Substance Use Topics    Alcohol use: Not Currently     Comment: has not drank since january 2022    Drug use: Not Currently     Types: Marijuana       Review of Systems   Constitutional: Negative for chills and fever  HENT: Negative for congestion  Respiratory: Negative for chest tightness and shortness of breath  Cardiovascular: Negative for chest pain  Gastrointestinal: Negative for nausea and vomiting  Musculoskeletal: Positive for joint swelling (foot)  Skin: Positive for color change and wound  Neurological: Negative for headaches  Psychiatric/Behavioral: Negative for behavioral problems and sleep disturbance  All other systems reviewed and are negative  Physical Exam  Physical Exam  Vitals and nursing note reviewed  Constitutional:       General: He is awake  Appearance: Normal appearance  He is well-developed  HENT:      Head: Normocephalic and atraumatic  Right Ear: External ear normal       Left Ear: External ear normal       Nose: Nose normal    Eyes:      General: No scleral icterus  Extraocular Movements: Extraocular movements intact  Cardiovascular:      Rate and Rhythm: Normal rate and regular rhythm  Pulses:           Dorsalis pedis pulses are 2+ on the left side  Posterior tibial pulses are 2+ on the left side  Heart sounds: Normal heart sounds, S1 normal and S2 normal  No murmur heard  No gallop  Pulmonary:      Effort: Pulmonary effort is normal       Breath sounds: Normal breath sounds  No decreased breath sounds, wheezing, rhonchi or rales  Musculoskeletal:         General: Normal range of motion  Cervical back: Normal range of motion  Comments: There is redness and swelling noted to the medial left forefoot as noted in photos below  Patient has full ROM and strength of foot and toes  Cap refill 2 sec  Pulses +2  Patient has had surgeries in the past causing the hindfoot to extend medially  There is no soft tissue swelling in that area   Skin:     General: Skin is warm and dry  Comments: There is a callous noted on the sole of the left foot with surrounding erythema  There is an open area of the callous  There is no purulent drainage, contaminants, foreign bodies, red streaking, crepitus noted  See photo   Neurological:      General: No focal deficit present  Mental Status: He is alert  Psychiatric:         Attention and Perception: Attention and perception normal          Mood and Affect: Mood normal          Behavior: Behavior normal  Behavior is cooperative                         Vital Signs  ED Triage Vitals   Temperature Pulse Respirations Blood Pressure SpO2   08/09/22 1629 08/09/22 1629 08/09/22 1629 08/09/22 1630 08/09/22 1629   98 5 °F (36 9 °C) 89 18 152/76 97 %      Temp Source Heart Rate Source Patient Position - Orthostatic VS BP Location FiO2 (%)   08/09/22 1629 08/09/22 1629 08/09/22 1630 08/09/22 1630 --   Temporal Monitor Sitting Left arm       Pain Score       08/09/22 2148       10 - Worst Possible Pain           Vitals:    08/09/22 1630 08/09/22 1845 08/09/22 2000 08/09/22 2127   BP: 152/76 (!) 184/88 (!) 173/83 (!) 184/86   Pulse:  78 78 79   Patient Position - Orthostatic VS: Sitting   Lying         Visual Acuity      ED Medications  Medications   cefepime (MAXIPIME) IVPB (premix in dextrose) 2,000 mg 50 mL (2,000 mg Intravenous New Bag 8/9/22 2218)   vancomycin (VANCOCIN) 1,750 mg in sodium chloride 0 9 % 500 mL IVPB (1,750 mg Intravenous New Bag 8/9/22 2302)   loratadine (CLARITIN) tablet 10 mg (has no administration in time range)   cyclobenzaprine (FLEXERIL) tablet 10 mg (has no administration in time range)   DULoxetine (CYMBALTA) delayed release capsule 60 mg (has no administration in time range)   fluticasone (FLONASE) 50 mcg/act nasal spray 2 spray (has no administration in time range)   folic acid (FOLVITE) tablet 2 mg (has no administration in time range)   glipiZIDE (GLUCOTROL XL) 24 hr tablet 10 mg (has no administration in time range)   aspirin (ECOTRIN LOW STRENGTH) EC tablet 81 mg (has no administration in time range)   insulin lispro (HumaLOG) 100 units/mL subcutaneous injection 37 Units (has no administration in time range)   pravastatin (PRAVACHOL) tablet 20 mg (has no administration in time range)   mirtazapine (REMERON) tablet 45 mg (45 mg Oral Given 8/10/22 0000)   propranolol (INDERAL LA) 24 hr capsule 160 mg (has no administration in time range)   sertraline (ZOLOFT) tablet 75 mg (has no administration in time range)   clonazePAM (KlonoPIN) tablet 1 mg (has no administration in time range)   acetaminophen (TYLENOL) tablet 650 mg (has no administration in time range)   ondansetron (ZOFRAN) injection 4 mg (has no administration in time range)   heparin (porcine) subcutaneous injection 5,000 Units (5,000 Units Subcutaneous Given 8/10/22 0001)   insulin lispro (HumaLOG) 100 units/mL subcutaneous injection 1-6 Units (has no administration in time range)   insulin lispro (HumaLOG) 100 units/mL subcutaneous injection 1-6 Units (1 Units Subcutaneous Not Given 8/10/22 0002)   lidocaine (LIDODERM) 5 % patch 1 patch (1 patch Topical Medication Applied 8/10/22 0003)   NIFEdipine (PROCARDIA XL) 24 hr tablet 120 mg (has no administration in time range)   losartan (COZAAR) tablet 100 mg (has no administration in time range)   hydrochlorothiazide (HYDRODIURIL) tablet 25 mg (has no administration in time range)   hydrALAZINE (APRESOLINE) injection 5 mg (has no administration in time range)   ketorolac (TORADOL) injection 15 mg (15 mg Intravenous Given 8/10/22 0055)       Diagnostic Studies  Results Reviewed     Procedure Component Value Units Date/Time    Procalcitonin [[id number]]  (Normal) Collected: 08/09/22 2041    Lab Status: Final result Specimen: Blood from Arm, Right Updated: 08/09/22 2333     Procalcitonin 0 09 ng/ml     Sedimentation rate, automated [[id number]]  (Abnormal) Collected: 08/09/22 1907    Lab Status: Final result Specimen: Blood from Arm, Left Updated: 08/09/22 2148     Sed Rate 83 mm/hour     C-reactive protein [[id number]]  (Abnormal) Collected: 08/09/22 1907    Lab Status: Final result Specimen: Blood from Arm, Left Updated: 08/09/22 2118     CRP 88 6 mg/L     Lactic acid [[id number]]  (Normal) Collected: 08/09/22 2040    Lab Status: Final result Specimen: Blood Updated: 08/09/22 2112     LACTIC ACID 1 2 mmol/L     Narrative:      Result may be elevated if tourniquet was used during collection  Blood culture #2 [[id number]] Collected: 08/09/22 1900    Lab Status: In process Specimen: Blood from Arm, Left Updated: 08/09/22 2052    Blood culture #1 [[id number]] Collected: 08/09/22 2046    Lab Status:  In process Specimen: Blood Updated: 08/09/22 2046    Comprehensive metabolic panel [[id number]]  (Abnormal) Collected: 08/09/22 1907    Lab Status: Final result Specimen: Blood from Arm, Left Updated: 08/09/22 1933     Sodium 136 mmol/L      Potassium 3 5 mmol/L      Chloride 93 mmol/L      CO2 33 mmol/L      ANION GAP 10 mmol/L      BUN 7 mg/dL      Creatinine 0 91 mg/dL      Glucose 100 mg/dL      Calcium 9 5 mg/dL      AST 25 U/L      ALT 15 U/L      Alkaline Phosphatase 107 U/L      Total Protein 8 9 g/dL      Albumin 3 8 g/dL      Total Bilirubin 0 50 mg/dL      eGFR 88 ml/min/1 73sq m     Narrative:      [place] guidelines for Chronic Kidney Disease (CKD):     Stage 1 with normal or high GFR (GFR > 90 mL/min/1 73 square meters)    Stage 2 Mild CKD (GFR = 60-89 mL/min/1 73 square meters)    Stage 3A Moderate CKD (GFR = 45-59 mL/min/1 73 square meters)    Stage 3B Moderate CKD (GFR = 30-44 mL/min/1 73 square meters)    Stage 4 Severe CKD (GFR = 15-29 mL/min/1 73 square meters)    Stage 5 End Stage CKD (GFR <15 mL/min/1 73 square meters)  Note: GFR calculation is accurate only with a steady state creatinine    CBC and differential [[id number]]  (Abnormal) Collected: 08/09/22 1907    Lab Status: Final result Specimen: Blood from Arm, Left Updated: 08/09/22 1915     WBC 15 25 Thousand/uL      RBC 4 99 Million/uL      Hemoglobin 13 8 g/dL      Hematocrit 43 7 %      MCV 88 fL      MCH 27 7 pg      MCHC 31 6 g/dL      RDW 14 9 %      MPV 10 1 fL      Platelets 241 Thousands/uL      nRBC 0 /100 WBCs      Neutrophils Relative 76 %      Immat GRANS % 0 %      Lymphocytes Relative 14 %      Monocytes Relative 7 %      Eosinophils Relative 2 %      Basophils Relative 1 %      Neutrophils Absolute 11 69 Thousands/µL      Immature Grans Absolute 0 06 Thousand/uL      Lymphocytes Absolute 2 12 Thousands/µL      Monocytes Absolute 1 01 Thousand/µL      Eosinophils Absolute 0 29 Thousand/µL      Basophils Absolute 0 08 Thousands/µL                  XR foot 3+ views LEFT   Final Result by Stacie Ashraf MD (08/09 2052)   No radiographic evidence of osteomyelitis  Workstation performed: [id number]                    Procedures  Procedures         ED Course  ED Course as of 08/10/22 0200   Tue Aug 09, 2022   2034 [place] texted [facility] for admission                                             MDM  Number of Diagnoses or Management Options  Cellulitis of left foot: new and requires workup  Diabetic foot ulcer [facility]): new and requires workup  Diagnosis management comments: 75 y/o male PMH DM, HTN here for wound of left foot  Patient sees wound care/podiatry and was sent to ER for concern for infection  Patient hypertensive but otherwise vitals stable   See photos in chart for exam findings - open callous noted on sole of left foot with surrounding erythema and swelling  No red streaking  Good pulses and cap refill  Pt denies fevers, chest pain, SOB  Differential cellulitis, osteomyelitis  Ordered CBC, CMP and xray to start  CBC shows elevated white count  Xray read as negative in ER  Started patient on rocephin and added septic workup  Reached out to [facility] for admission to r/o osteomyeltis and patient accepted by Candelario Mcneil for the inpatient med surg floor under Dr Tomasz Lamb  ABX changed to cefepime and vancomycin  Care transferred to the admitting team at this time  Patient stable upon leaving the ER>        Amount and/or Complexity of Data Reviewed  Clinical lab tests: ordered and reviewed  Tests in the radiology section of CPT®: ordered and reviewed    Risk of Complications, Morbidity, and/or Mortality  Presenting problems: moderate  Diagnostic procedures: low  Management options: moderate    Patient Progress  Patient progress: stable      Disposition  Final diagnoses:   Diabetic foot ulcer ([facility] )   Cellulitis of left foot     Time reflects when diagnosis was documented in both MDM as applicable and the Disposition within this note     Time User Action Codes Description Comment    8/9/2022  8:39 PM Kamala Murray [G48 [id number],  L97 [id number]] Diabetic foot ulcer ([facility] )     8/9/2022  8:40 PM Kamala Murray [[id number]] Cellulitis of left foot       ED Disposition     ED Disposition   Admit    Condition   Stable    Date/Time   Tue Aug 9, 2022  8:37 PM    Comment   Case was discussed with Candelario Mcneil PA-C and the patient's admission status was agreed to be Admission Status: inpatient status to the service of Dr Tomasz Lamb              Follow-up Information    None         Current Discharge Medication List      CONTINUE these medications which have NOT CHANGED    Details   sertraline (ZOLOFT) 25 mg tablet Take 75 mg by mouth daily      ACCU-CHEK FABIANO PLUS test strip 4 (four) times a day  Refills: 1      ACCU-CHEK FASTCLIX LANCETS MISC 4 (four) times a day  Refills: 1      ADMELOG 100 UNIT/ML injection 37 Units 2 (two) times a day with meals  Refills: 6      cetirizine (ZyrTEC) 10 mg tablet Take 10 mg by mouth daily  Refills: 2      clonazePAM (KlonoPIN) 1 mg tablet 2 (two) times a day as needed for anxiety  Refills: 4      cyclobenzaprine (FLEXERIL) 10 mg tablet Take 10 mg by mouth daily at bedtime as needed for muscle spasms  Refills: 1      DULoxetine (CYMBALTA) 60 mg delayed release capsule Take 60 mg by mouth 2 (two) times a day      fluticasone (FLONASE) 50 mcg/act nasal spray 2 sprays daily As directed  Refills: 1      folic acid (FOLVITE) 1 mg tablet Take 2,000 mcg by mouth daily  Refills: 6      glipiZIDE (GLUCOTROL XL) 10 mg 24 hr tablet Take 10 mg by mouth 2 (two) times a day  Refills: 3      GLOBAL INJECT EASE INSULIN SYR 31G X 5/16" 1 ML MISC 2 (two) times a day  Refills: 0      GNP ASPIRIN LOW DOSE 81 MG EC tablet Take 81 mg by mouth daily  Refills: 0      ibuprofen (MOTRIN) 400 mg tablet Take 800 mg by mouth 3 (three) times a day  Refills: 1      Jardiance 10 MG TABS Take 10 mg by mouth daily      ketoconazole (NIZORAL) 2 % shampoo daily Use as directed  Refills: 6      losartan-hydrochlorothiazide (HYZAAR) 100-25 MG per tablet Take 1 tablet by mouth daily  Refills: 0      lovastatin (MEVACOR) 20 mg tablet Take 20 mg by mouth daily  Refills: 3      metFORMIN (GLUCOPHAGE) 1000 MG tablet       mirtazapine (REMERON) 45 MG tablet Take 45 mg by mouth daily at bedtime  Refills: 1      NIFEdipine (PROCARDIA XL) 30 mg 24 hr tablet TAKE 1 TABLET BY MOUTH DAILY in addition TO 90mg FOR A total OF 120mg DAILY      NIFEdipine (PROCARDIA XL) 90 mg 24 hr tablet Take 90 mg by mouth daily  Refills: 3      propranolol (INDERAL LA) 160 mg Take 160 mg by mouth daily  Refills: 3             No discharge procedures on file      PDMP Review       Value Time User    PDMP Reviewed  Yes 8/9/2022 11:28 PM Armand Collet, PA-C          ED Provider  Electronically Signed by           Han Smith PA-C  08/10/22 6184

## 2022-08-09 NOTE — DISCHARGE INSTRUCTIONS

## 2022-08-09 NOTE — H&P
History of Present Illness: The patient is a 74 y o  male who presents with complaints of low back pain      Patient Active Problem List   Diagnosis    [facility]    [facility]    Bilateral adhesive capsulitis of shoulders    Bronchitis, mucopurulent recurrent ([facility]    Cirrhosis, alcoholic ([facility]    Diabetic peripheral neuropathy ([facility]    [facility]    Herniated nucleus pulposus of lumbosacral region    Hypercholesterolemia    Lumbar spondylosis    Thyroid cancer ([facility]       Past Medical History:   Diagnosis Date    Anxiety     Arthritis     Cancer [facility]     Depression     Diabetes mellitus ([facility]     Hypertension     Liver disease     Mitral valve prolapse     Thyroid disease        Past Surgical History:   Procedure Laterality Date    JOINT REPLACEMENT Left 03/11/2022    Left TSA    THYROID SURGERY  2021    remove cancer         Current Outpatient Medications:     ACCU-CHEK FABIANO PLUS test strip, 4 (four) times a day, Disp: , Rfl: 1    ACCU-CHEK FASTCLIX LANCETS MISC, 4 (four) times a day, Disp: , Rfl: 1    ADMELOG 100 UNIT/ML injection, inject 25 UNITS SUBCUTANEOUSLY DAILY, Disp: , Rfl: 6    ARIPiprazole (ABILIFY) 30 mg tablet, Take 30 mg by mouth daily at bedtime (Patient not taking: Reported on 7/29/2022), Disp: , Rfl: 1    cetirizine (ZyrTEC) 10 mg tablet, Take 10 mg by mouth daily, Disp: , Rfl: 2    clonazePAM (KlonoPIN) 1 mg tablet, TAKE 1 TABLET BY MOUTH IN THE MORNING AND TAKE 2 TABLETS BY MOUTH AT BEDTIME, Disp: , Rfl: 4    cyclobenzaprine (FLEXERIL) 10 mg tablet, Take 10 mg by mouth 3 (three) times a day, Disp: , Rfl: 1    DIGOX 250 MCG tablet, TAKE 1 TABLET BY MOUTH EVERY DAY BUT DO NOT TAKE ON SUNDAY OR WEDNESDAY (Patient not taking: Reported on 7/29/2022), Disp: , Rfl: 0    DULoxetine (CYMBALTA) 20 mg capsule, Take 20 mg by mouth daily, Disp: , Rfl:     fluticasone (FLONASE) 50 mcg/act nasal spray, 2 sprays daily As directed, Disp: , Rfl: 1    folic acid (FOLVITE) 1 mg tablet, Take 2,000 mcg by mouth daily, Disp: , Rfl: 6    glipiZIDE (GLUCOTROL XL) 10 mg 24 hr tablet, Take 10 mg by mouth 2 (two) times a day, Disp: , Rfl: 3    GLOBAL INJECT EASE INSULIN SYR 31G X 5/16" 1 ML MISC, 2 (two) times a day, Disp: , Rfl: 0    GNP ASPIRIN LOW DOSE 81 MG EC tablet, Take 81 mg by mouth daily, Disp: , Rfl: 0    HUMULIN N 100 UNIT/ML subcutaneous injection, INJECT 40 UNITS IN THE MORNING AND 6 UNITS in THE IN THE EVENING AS DIRECTED, Disp: , Rfl: 6    ibuprofen (MOTRIN) 400 mg tablet, Take 800 mg by mouth 3 (three) times a day, Disp: , Rfl: 1    ketoconazole (NIZORAL) 2 % shampoo, daily Use as directed, Disp: , Rfl: 6    losartan-hydrochlorothiazide (HYZAAR) 100-25 MG per tablet, Take 1 tablet by mouth daily, Disp: , Rfl: 0    lovastatin (MEVACOR) 20 mg tablet, Take 20 mg by mouth daily, Disp: , Rfl: 3    metFORMIN (GLUCOPHAGE) 1000 MG tablet, , Disp: , Rfl:     mirtazapine (REMERON) 45 MG tablet, Take 45 mg by mouth daily at bedtime, Disp: , Rfl: 1    naproxen (NAPROSYN) 500 mg tablet, Take 1 tablet (500 mg total) by mouth 2 (two) times a day with meals (Patient not taking: Reported on 7/29/2022), Disp: 20 tablet, Rfl: 0    NIFEdipine (PROCARDIA XL) 90 mg 24 hr tablet, Take 90 mg by mouth daily, Disp: , Rfl: 3    ondansetron (ZOFRAN) 4 mg tablet, Take 1 tablet (4 mg total) by mouth every 6 (six) hours as needed for nausea or vomiting (Patient not taking: Reported on 7/29/2022), Disp: 12 tablet, Rfl: 0    propranolol (INDERAL LA) 160 mg, Take 160 mg by mouth daily, Disp: , Rfl: 3    Current Facility-Administered Medications:     bupivacaine (PF) (MARCAINE) 0 25 % injection 10 mL, 10 mL, Perineural, Once, Tommy Barrera DO    Allergies   Allergen Reactions    Cephalexin Rash    Abilify [Aripiprazole] Other (See Comments)     Shaking      Molds & Smuts        Physical Exam:   General: Awake, Alert, Oriented x 3, Mood and affect appropriate  Respiratory: Respirations even and unlabored  Cardiovascular: Peripheral pulses intact; no edema  Musculoskeletal Exam:  Slightly antalgic gait    ASA Score: III         Assessment:   1   Lumbar spondylosis        Plan: Bilateral L3, L4, L5 medial branch with 0 25% bupivacaine

## 2022-08-10 PROBLEM — K76.9 LIVER DISEASE: Status: ACTIVE | Noted: 2022-08-10

## 2022-08-10 LAB
ANION GAP SERPL CALCULATED.3IONS-SCNC: 7 MMOL/L (ref 4–13)
BASOPHILS # BLD AUTO: 0.06 THOUSANDS/ΜL (ref 0–0.1)
BASOPHILS NFR BLD AUTO: 1 % (ref 0–1)
BUN SERPL-MCNC: 6 MG/DL (ref 5–25)
CALCIUM SERPL-MCNC: 9.3 MG/DL (ref 8.3–10.1)
CHLORIDE SERPL-SCNC: 97 MMOL/L (ref 96–108)
CO2 SERPL-SCNC: 32 MMOL/L (ref 21–32)
CREAT SERPL-MCNC: 0.82 MG/DL (ref 0.6–1.3)
EOSINOPHIL # BLD AUTO: 0.22 THOUSAND/ΜL (ref 0–0.61)
EOSINOPHIL NFR BLD AUTO: 2 % (ref 0–6)
ERYTHROCYTE [DISTWIDTH] IN BLOOD BY AUTOMATED COUNT: 14.8 % (ref 11.6–15.1)
EST. AVERAGE GLUCOSE BLD GHB EST-MCNC: 140 MG/DL
GFR SERPL CREATININE-BSD FRML MDRD: 92 ML/MIN/1.73SQ M
GLUCOSE SERPL-MCNC: 100 MG/DL (ref 65–140)
GLUCOSE SERPL-MCNC: 115 MG/DL (ref 65–140)
GLUCOSE SERPL-MCNC: 118 MG/DL (ref 65–140)
GLUCOSE SERPL-MCNC: 120 MG/DL (ref 65–140)
GLUCOSE SERPL-MCNC: 186 MG/DL (ref 65–140)
GLUCOSE SERPL-MCNC: 55 MG/DL (ref 65–140)
GLUCOSE SERPL-MCNC: 85 MG/DL (ref 65–140)
HBA1C MFR BLD: 6.5 %
HCT VFR BLD AUTO: 40.4 % (ref 36.5–49.3)
HGB BLD-MCNC: 12.5 G/DL (ref 12–17)
IMM GRANULOCYTES # BLD AUTO: 0.05 THOUSAND/UL (ref 0–0.2)
IMM GRANULOCYTES NFR BLD AUTO: 0 % (ref 0–2)
LYMPHOCYTES # BLD AUTO: 1.86 THOUSANDS/ΜL (ref 0.6–4.47)
LYMPHOCYTES NFR BLD AUTO: 15 % (ref 14–44)
MCH RBC QN AUTO: 26.8 PG (ref 26.8–34.3)
MCHC RBC AUTO-ENTMCNC: 30.9 G/DL (ref 31.4–37.4)
MCV RBC AUTO: 87 FL (ref 82–98)
MONOCYTES # BLD AUTO: 0.98 THOUSAND/ΜL (ref 0.17–1.22)
MONOCYTES NFR BLD AUTO: 8 % (ref 4–12)
NEUTROPHILS # BLD AUTO: 8.9 THOUSANDS/ΜL (ref 1.85–7.62)
NEUTS SEG NFR BLD AUTO: 74 % (ref 43–75)
NRBC BLD AUTO-RTO: 0 /100 WBCS
PLATELET # BLD AUTO: 364 THOUSANDS/UL (ref 149–390)
PMV BLD AUTO: 9.9 FL (ref 8.9–12.7)
POTASSIUM SERPL-SCNC: 3 MMOL/L (ref 3.5–5.3)
PROCALCITONIN SERPL-MCNC: 0.08 NG/ML
RBC # BLD AUTO: 4.67 MILLION/UL (ref 3.88–5.62)
SODIUM SERPL-SCNC: 136 MMOL/L (ref 135–147)
WBC # BLD AUTO: 12.07 THOUSAND/UL (ref 4.31–10.16)

## 2022-08-10 PROCEDURE — 82948 REAGENT STRIP/BLOOD GLUCOSE: CPT

## 2022-08-10 PROCEDURE — 80048 BASIC METABOLIC PNL TOTAL CA: CPT | Performed by: INTERNAL MEDICINE

## 2022-08-10 PROCEDURE — 83036 HEMOGLOBIN GLYCOSYLATED A1C: CPT | Performed by: STUDENT IN AN ORGANIZED HEALTH CARE EDUCATION/TRAINING PROGRAM

## 2022-08-10 PROCEDURE — 87186 SC STD MICRODIL/AGAR DIL: CPT | Performed by: PODIATRIST

## 2022-08-10 PROCEDURE — 99233 SBSQ HOSP IP/OBS HIGH 50: CPT | Performed by: STUDENT IN AN ORGANIZED HEALTH CARE EDUCATION/TRAINING PROGRAM

## 2022-08-10 PROCEDURE — 87070 CULTURE OTHR SPECIMN AEROBIC: CPT | Performed by: PODIATRIST

## 2022-08-10 PROCEDURE — 87077 CULTURE AEROBIC IDENTIFY: CPT | Performed by: PODIATRIST

## 2022-08-10 PROCEDURE — 84145 PROCALCITONIN (PCT): CPT | Performed by: INTERNAL MEDICINE

## 2022-08-10 PROCEDURE — 87205 SMEAR GRAM STAIN: CPT | Performed by: PODIATRIST

## 2022-08-10 PROCEDURE — 85025 COMPLETE CBC W/AUTO DIFF WBC: CPT | Performed by: INTERNAL MEDICINE

## 2022-08-10 RX ORDER — HYDRALAZINE HYDROCHLORIDE 20 MG/ML
5 INJECTION INTRAMUSCULAR; INTRAVENOUS EVERY 6 HOURS PRN
Status: DISCONTINUED | OUTPATIENT
Start: 2022-08-10 | End: 2022-08-15 | Stop reason: HOSPADM

## 2022-08-10 RX ORDER — INSULIN GLARGINE 100 [IU]/ML
37 INJECTION, SOLUTION SUBCUTANEOUS 2 TIMES DAILY WITH MEALS
Status: DISCONTINUED | OUTPATIENT
Start: 2022-08-11 | End: 2022-08-11

## 2022-08-10 RX ORDER — INSULIN GLARGINE 100 [IU]/ML
37 INJECTION, SOLUTION SUBCUTANEOUS 2 TIMES DAILY WITH MEALS
COMMUNITY
Start: 2022-07-10

## 2022-08-10 RX ORDER — KETOROLAC TROMETHAMINE 30 MG/ML
15 INJECTION, SOLUTION INTRAMUSCULAR; INTRAVENOUS EVERY 6 HOURS PRN
Status: DISPENSED | OUTPATIENT
Start: 2022-08-10 | End: 2022-08-12

## 2022-08-10 RX ORDER — POTASSIUM CHLORIDE 20 MEQ/1
40 TABLET, EXTENDED RELEASE ORAL 2 TIMES DAILY
Status: COMPLETED | OUTPATIENT
Start: 2022-08-10 | End: 2022-08-10

## 2022-08-10 RX ORDER — HYDROXYZINE PAMOATE 25 MG/1
50 CAPSULE ORAL
COMMUNITY
Start: 2022-07-02

## 2022-08-10 RX ORDER — INSULIN LISPRO 100 [IU]/ML
18 INJECTION, SOLUTION INTRAVENOUS; SUBCUTANEOUS 2 TIMES DAILY WITH MEALS
Status: DISCONTINUED | OUTPATIENT
Start: 2022-08-11 | End: 2022-08-10

## 2022-08-10 RX ADMIN — POTASSIUM CHLORIDE 40 MEQ: 1500 TABLET, EXTENDED RELEASE ORAL at 17:08

## 2022-08-10 RX ADMIN — LIDOCAINE 5% 1 PATCH: 700 PATCH TOPICAL at 00:03

## 2022-08-10 RX ADMIN — MIRTAZAPINE 45 MG: 15 TABLET, FILM COATED ORAL at 00:00

## 2022-08-10 RX ADMIN — CYCLOBENZAPRINE HYDROCHLORIDE 10 MG: 10 TABLET, FILM COATED ORAL at 15:53

## 2022-08-10 RX ADMIN — LORATADINE 10 MG: 10 TABLET ORAL at 09:45

## 2022-08-10 RX ADMIN — LOSARTAN POTASSIUM 100 MG: 50 TABLET, FILM COATED ORAL at 09:45

## 2022-08-10 RX ADMIN — FLUTICASONE PROPIONATE 2 SPRAY: 50 SPRAY, METERED NASAL at 09:44

## 2022-08-10 RX ADMIN — PROPRANOLOL HYDROCHLORIDE 160 MG: 80 CAPSULE, EXTENDED RELEASE ORAL at 09:45

## 2022-08-10 RX ADMIN — CEFEPIME HYDROCHLORIDE 2000 MG: 2 INJECTION, SOLUTION INTRAVENOUS at 20:41

## 2022-08-10 RX ADMIN — VANCOMYCIN HYDROCHLORIDE 1750 MG: 5 INJECTION, POWDER, LYOPHILIZED, FOR SOLUTION INTRAVENOUS at 21:24

## 2022-08-10 RX ADMIN — CEFEPIME HYDROCHLORIDE 2000 MG: 2 INJECTION, SOLUTION INTRAVENOUS at 09:44

## 2022-08-10 RX ADMIN — DULOXETINE 60 MG: 30 CAPSULE, DELAYED RELEASE ORAL at 21:23

## 2022-08-10 RX ADMIN — INSULIN LISPRO 18 UNITS: 100 INJECTION, SOLUTION INTRAVENOUS; SUBCUTANEOUS at 07:55

## 2022-08-10 RX ADMIN — PRAVASTATIN SODIUM 20 MG: 20 TABLET ORAL at 17:07

## 2022-08-10 RX ADMIN — CLONAZEPAM 1 MG: 1 TABLET ORAL at 10:40

## 2022-08-10 RX ADMIN — INSULIN LISPRO 1 UNITS: 100 INJECTION, SOLUTION INTRAVENOUS; SUBCUTANEOUS at 17:09

## 2022-08-10 RX ADMIN — INSULIN LISPRO 18 UNITS: 100 INJECTION, SOLUTION INTRAVENOUS; SUBCUTANEOUS at 17:08

## 2022-08-10 RX ADMIN — ACETAMINOPHEN 650 MG: 325 TABLET ORAL at 15:53

## 2022-08-10 RX ADMIN — GLIPIZIDE 10 MG: 5 TABLET, FILM COATED, EXTENDED RELEASE ORAL at 09:44

## 2022-08-10 RX ADMIN — HYDROCHLOROTHIAZIDE 25 MG: 25 TABLET ORAL at 09:45

## 2022-08-10 RX ADMIN — HEPARIN SODIUM 5000 UNITS: 5000 INJECTION INTRAVENOUS; SUBCUTANEOUS at 00:01

## 2022-08-10 RX ADMIN — HEPARIN SODIUM 5000 UNITS: 5000 INJECTION INTRAVENOUS; SUBCUTANEOUS at 17:08

## 2022-08-10 RX ADMIN — DULOXETINE 60 MG: 30 CAPSULE, DELAYED RELEASE ORAL at 09:45

## 2022-08-10 RX ADMIN — VANCOMYCIN HYDROCHLORIDE 1750 MG: 5 INJECTION, POWDER, LYOPHILIZED, FOR SOLUTION INTRAVENOUS at 10:41

## 2022-08-10 RX ADMIN — HEPARIN SODIUM 5000 UNITS: 5000 INJECTION INTRAVENOUS; SUBCUTANEOUS at 09:44

## 2022-08-10 RX ADMIN — FOLIC ACID 2 MG: 1 TABLET ORAL at 09:44

## 2022-08-10 RX ADMIN — KETOROLAC TROMETHAMINE 15 MG: 30 INJECTION, SOLUTION INTRAMUSCULAR; INTRAVENOUS at 00:55

## 2022-08-10 RX ADMIN — NIFEDIPINE 120 MG: 30 TABLET, FILM COATED, EXTENDED RELEASE ORAL at 09:45

## 2022-08-10 RX ADMIN — KETOROLAC TROMETHAMINE 15 MG: 30 INJECTION, SOLUTION INTRAMUSCULAR; INTRAVENOUS at 10:41

## 2022-08-10 RX ADMIN — KETOROLAC TROMETHAMINE 15 MG: 30 INJECTION, SOLUTION INTRAMUSCULAR; INTRAVENOUS at 21:41

## 2022-08-10 RX ADMIN — SERTRALINE HYDROCHLORIDE 75 MG: 25 TABLET ORAL at 09:45

## 2022-08-10 RX ADMIN — ASPIRIN 81 MG: 81 TABLET, COATED ORAL at 09:45

## 2022-08-10 RX ADMIN — MIRTAZAPINE 45 MG: 15 TABLET, FILM COATED ORAL at 21:23

## 2022-08-10 RX ADMIN — POTASSIUM CHLORIDE 40 MEQ: 1500 TABLET, EXTENDED RELEASE ORAL at 09:44

## 2022-08-10 NOTE — PLAN OF CARE
Problem: Potential for Falls  Goal: Patient will remain free of falls  Description: INTERVENTIONS:  - Educate patient/family on patient safety including physical limitations  - Instruct patient to call for assistance with activity   - Consult OT/PT to assist with strengthening/mobility   - Keep Call bell within reach  - Keep bed low and locked with side rails adjusted as appropriate  - Keep care items and personal belongings within reach  - Initiate and maintain comfort rounds  - Make Fall Risk Sign visible to staff  - Offer Toileting every 2 Hours, in advance of need  - Initiate/Maintain bed alarm  - Obtain necessary fall risk management equipment:   - Apply yellow socks and bracelet for high fall risk patients  - Consider moving patient to room near nurses station  Outcome: Progressing     Problem: METABOLIC, FLUID AND ELECTROLYTES - ADULT  Goal: Electrolytes maintained within normal limits  Description: INTERVENTIONS:  - Monitor labs and assess patient for signs and symptoms of electrolyte imbalances  - Administer electrolyte replacement as ordered  - Monitor response to electrolyte replacements, including repeat lab results as appropriate  - Instruct patient on fluid and nutrition as appropriate  Outcome: Progressing     Problem: SKIN/TISSUE INTEGRITY - ADULT  Goal: Skin Integrity remains intact(Skin Breakdown Prevention)  Description: Assess:  -Perform Sanjay assessment  -Clean and moisturize skin  -Inspect skin when repositioning, toileting, and assisting with ADLS  -Assess under medical devices  -Assess extremities for adequate circulation and sensation     Bed Management:  -Have minimal linens on bed & keep smooth, unwrinkled  -Change linens as needed when moist or perspiring  -Avoid sitting or lying in one position for more than 2 hours while in bed  -Keep HOB at 30 degrees     Toileting:  -Offer bedside commode  -Assess for incontinence  -Use incontinent care products after each incontinent episode    Activity:  -Mobilize patient 2 times a day  -Encourage activity and walks on unit  -Encourage or provide ROM exercises   -Turn and reposition patient every 2 Hours  -Use appropriate equipment to lift or move patient in bed  -Instruct/ Assist with weight shifting when out of bed in chair  -Consider limitation of chair time 2 hour intervals    Skin Care:  -Avoid use of baby powder, tape, friction and shearing, hot water or constrictive clothing  -Relieve pressure over bony prominences  -Do not massage red bony areas    Next Steps:  -Teach patient strategies to minimize risks   -Consider consults to  interdisciplinary teams   Outcome: Progressing  Goal: Incision(s), wounds(s) or drain site(s) healing without S/S of infection  Description: INTERVENTIONS  - Assess and document dressing, incision, wound bed, drain sites and surrounding tissue  - Provide patient and family education  - Perform skin care/dressing changes  Outcome: Progressing     Problem: HEMATOLOGIC - ADULT  Goal: Maintains hematologic stability  Description: INTERVENTIONS  - Assess for signs and symptoms of bleeding or hemorrhage  - Monitor labs  - Administer supportive blood products/factors as ordered and appropriate  Outcome: Progressing     Problem: MUSCULOSKELETAL - ADULT  Goal: Maintain or return mobility to safest level of function  Description: INTERVENTIONS:  - Assess patient's ability to carry out ADLs; assess patient's baseline for ADL function and identify physical deficits which impact ability to perform ADLs (bathing, care of mouth/teeth, toileting, grooming, dressing, etc )  - Assess/evaluate cause of self-care deficits   - Assess range of motion  - Assess patient's mobility  - Assess patient's need for assistive devices and provide as appropriate  - Encourage maximum independence but intervene and supervise when necessary  - Involve family in performance of ADLs  - Assess for home care needs following discharge   - Consider OT consult to assist with ADL evaluation and planning for discharge  - Provide patient education as appropriate  Outcome: Progressing     Problem: PAIN - ADULT  Goal: Verbalizes/displays adequate comfort level or baseline comfort level  Description: Interventions:  - Encourage patient to monitor pain and request assistance  - Assess pain using appropriate pain scale  - Administer analgesics based on type and severity of pain and evaluate response  - Implement non-pharmacological measures as appropriate and evaluate response  - Consider cultural and social influences on pain and pain management  - Notify physician/advanced practitioner if interventions unsuccessful or patient reports new pain  Outcome: Progressing     Problem: INFECTION - ADULT  Goal: Absence or prevention of progression during hospitalization  Description: INTERVENTIONS:  - Assess and monitor for signs and symptoms of infection  - Monitor lab/diagnostic results  - Monitor all insertion sites, i e  indwelling lines, tubes, and drains  - Monitor endotracheal if appropriate and nasal secretions for changes in amount and color  - [place] appropriate cooling/warming therapies per order  - Administer medications as ordered  - Instruct and encourage patient and family to use good hand hygiene technique  - Identify and instruct in appropriate isolation precautions for identified infection/condition  Outcome: Progressing     Problem: SAFETY ADULT  Goal: Patient will remain free of falls  Description: INTERVENTIONS:  - Educate patient/family on patient safety including physical limitations  - Instruct patient to call for assistance with activity   - Consult OT/PT to assist with strengthening/mobility   - Keep Call bell within reach  - Keep bed low and locked with side rails adjusted as appropriate  - Keep care items and personal belongings within reach  - Initiate and maintain comfort rounds  - Make Fall Risk Sign visible to staff  - Offer Toileting every 2 Hours, in advance of need  - Initiate/Maintain bed alarm  - Obtain necessary fall risk management equipment:   - Apply yellow socks and bracelet for high fall risk patients  - Consider moving patient to room near nurses station  Outcome: Progressing     Problem: DISCHARGE PLANNING  Goal: Discharge to home or other facility with appropriate resources  Description: INTERVENTIONS:  - Identify barriers to discharge w/patient and caregiver  - Arrange for needed discharge resources and transportation as appropriate  - Identify discharge learning needs (meds, wound care, etc )  - Arrange for interpretive services to assist at discharge as needed  - Refer to Case Management Department for coordinating discharge planning if the patient needs post-hospital services based on physician/advanced practitioner order or complex needs related to functional status, cognitive ability, or social support system  Outcome: Progressing     Problem: Knowledge Deficit  Goal: Patient/family/caregiver demonstrates understanding of disease process, treatment plan, medications, and discharge instructions  Description: Complete learning assessment and assess knowledge base  Interventions:  - Provide teaching at level of understanding  - Provide teaching via preferred learning methods  Outcome: Progressing     Problem: Nutrition/Hydration-ADULT  Goal: Nutrient/Hydration intake appropriate for improving, restoring or maintaining nutritional needs  Description: Monitor and assess patient's nutrition/hydration status for malnutrition  Collaborate with interdisciplinary team and initiate plan and interventions as ordered  Monitor patient's weight and dietary intake as ordered or per policy  Utilize nutrition screening tool and intervene as necessary  Determine patient's food preferences and provide high-protein, high-caloric foods as appropriate       INTERVENTIONS:  - Monitor oral intake, urinary output, labs, and treatment plans  - Assess nutrition and hydration status and recommend course of action  - Evaluate amount of meals eaten  - Assist patient with eating if necessary   - Allow adequate time for meals  - Recommend/ encourage appropriate diets, oral nutritional supplements, and vitamin/mineral supplements  - Order, calculate, and assess calorie counts as needed  - Recommend, monitor, and adjust tube feedings and TPN/PPN based on assessed needs  - Assess need for intravenous fluids  - Provide specific nutrition/hydration education as appropriate  - Include patient/family/caregiver in decisions related to nutrition  Outcome: Progressing

## 2022-08-10 NOTE — ASSESSMENT & PLAN NOTE
· Met sepsis due to tachycardia, WBC 15 25  Source of infection cellulitis of diabetic ulcer on left foot  R/O osteo   · Lactic WNL   Procal pending   · IV cefepime and IV vancomycin  · BC pending

## 2022-08-10 NOTE — PHYSICAL THERAPY NOTE
Physical Therapy Cancellation Note       08/10/22 1035   PT Last Visit   PT Visit Date 08/10/22   Note Type   Note type Cancelled Session   Cancel Reasons Medical status   Additional Comments Awaiting podiatry consult/recommendations due to concern for osteomyelitis  Will need weight bearing orders  Jaylan Page, PT

## 2022-08-10 NOTE — PROGRESS NOTES
Vancomycin Assessment    Aroldo Crane is a 74 y o  male who is currently receiving vancomycin for SSTI/bone and joint infection   Relevant clinical data and objective history reviewed:  Creatinine   Date Value Ref Range Status   08/09/2022 0 91 0 60 - 1 30 mg/dL Final     Comment:     Standardized to IDMS reference method   04/29/2022 0 93 0 60 - 1 30 mg/dL Final     Comment:     Standardized to IDMS reference method   04/25/2020 1 31 (H) 0 60 - 1 30 mg/dL Final     Comment:     Standardized to IDMS reference method     BP (!) 173/83   Pulse 78   Temp 98 5 °F (36 9 °C) (Temporal)   Resp 18   SpO2 96%   No intake/output data recorded  Lab Results   Component Value Date/Time    BUN 7 08/09/2022 07:07 PM    WBC 15 25 (H) 08/09/2022 07:07 PM    HGB 13 8 08/09/2022 07:07 PM    HCT 43 7 08/09/2022 07:07 PM    MCV 88 08/09/2022 07:07 PM     (H) 08/09/2022 07:07 PM     Temp Readings from Last 3 Encounters:   08/09/22 98 5 °F (36 9 °C) (Temporal)   08/09/22 98 °F (36 7 °C) (Temporal)   07/29/22 97 9 °F (36 6 °C)     Vancomycin Days of Therapy: 1    Assessment/Plan  The patient is currently on vancomycin utilizing scheduled dosing based on actual body weight  The patient will be started on vancomycin 1750 mg Q 12 H  Pharmacy will also follow closely for s/sx of nephrotoxicity, infusion reactions, and appropriateness of therapy  BMP and CBC will be ordered per protocol  Plan for trough as patient approaches steady state, prior to the 4th  dose at approximately 0900 on 08/11/22  Due to infection severity, will target a trough of 15-20  Pharmacy will continue to follow the patients culture results and clinical progress daily      Kalli Hill, Pharmacist

## 2022-08-10 NOTE — CONSULTS
Consult - Podiatry   Jolene Muñoz 74 y o  male MRN: [id number]  Unit/Bed#: -01 Encounter: [id number]    Assessment/Plan     Cellulitis/abscess left foot/sepsis  · Continue with current IV antibiotics  · X-rays show no destructive changes suggesting osteo  · Elevated CRP and ESR 83 suggestive of possible underlying bone infection  · Rx MRI to rule out underlying osteomyelitis  · WBC 15 25 upon admission, 12 07 today    Diabetic Skin ulceration left forefoot with fat layer exposed  · Chronic wound open since the beginning of June with history of opening and closing  · Wound culture obtained from brown purulent drainage 2 separate sites  · Pending MRI may need/require OR debridement  DM2 with neuropathy, Hypertensive urgency, hypercholesterolemia  · Managed per Internal Medicine  · Recommend patient continue with regular diabetic foot care  · Will require new diabetic shoes in the near future  History of Present Illness     HPI:  Jolene Muñoz is a 74 y o  male who presents with a red hot swollen left foot  Past medical history of DM2,  hypertension, alcoholic cirrhosis, alcoholism, and mitral valve prolapse  Patient relates increased swelling and pain over past 3 days, saw his regular podiatrist earlier today and recommended he go to the ER for evaluation  Patient reports ulcer being open since the beginning of June with it healing and reopening at least twice  Podiatry consult requested to evaluate infection left foot  Patient's chart reviewed noting all pertinent clinical laboratory data  Inpatient consult to Podiatry  Consult performed by: Anthony Ferrari DPM  Consult ordered by: Alisia Dupree PA-C        Review of Systems   Constitutional: Negative  HENT: Negative  Eyes: Negative  Respiratory: Negative  Cardiovascular:  Negative for shortness of breath    Gastrointestinal: Negative      Musculoskeletal:  Denies any pain   Skin:  Open wound with drainage left foot Neurological:  Numb feet        Historical Information   Past Medical History:   Diagnosis Date    Anxiety     Arthritis     [facility]     Depression     Diabetes mellitus ([facility] )     Hypertension     Liver disease     Mitral valve prolapse     Thyroid disease      Past Surgical History:   Procedure Laterality Date    JOINT REPLACEMENT Left 03/11/2022    Left TSA    THYROID SURGERY  2021    remove cancer     Social History   Social History     Substance and Sexual Activity   Alcohol Use Not Currently    Comment: has not drank since january 2022     Social History     Substance and Sexual Activity   Drug Use Not Currently    Types: Marijuana     Social History     Tobacco Use   Smoking Status Former Smoker    Packs/day: 0 25   Smokeless Tobacco Never Used   Tobacco Comment    quit 1981     Family History: History reviewed  No pertinent family history      Meds/Allergies   Medications Prior to Admission   Medication    sertraline (ZOLOFT) 25 mg tablet    ACCU-CHEK FABIANO PLUS test strip    ACCU-CHEK FASTCLIX LANCETS [facility]    ADMELOG 100 UNIT/ML injection    cetirizine (ZyrTEC) 10 mg tablet    clonazePAM (KlonoPIN) 1 mg tablet    cyclobenzaprine (FLEXERIL) 10 mg tablet    DULoxetine (CYMBALTA) 60 mg delayed release capsule    fluticasone (FLONASE) 50 mcg/act nasal spray    folic acid (FOLVITE) 1 mg tablet    glipiZIDE (GLUCOTROL XL) 10 mg 24 hr tablet    GLOBAL INJECT EASE INSULIN SYR 31G X 5/16" 1 ML MISC    GNP ASPIRIN LOW DOSE 81 MG EC tablet    ibuprofen (MOTRIN) 400 mg tablet    Jardiance 10 MG TABS    ketoconazole (NIZORAL) 2 % shampoo    losartan-hydrochlorothiazide (HYZAAR) 100-25 MG per tablet    lovastatin (MEVACOR) 20 mg tablet    metFORMIN (GLUCOPHAGE) 1000 MG tablet    mirtazapine (REMERON) 45 MG tablet    NIFEdipine (PROCARDIA XL) 30 mg 24 hr tablet    NIFEdipine (PROCARDIA XL) 90 mg 24 hr tablet    propranolol (INDERAL LA) 160 mg     Allergies   Allergen Reactions    Cephalexin Rash    Abilify [Aripiprazole] Other (See Comments)     Shaking      Molds & Smuts        Objective   First Vitals:   Blood Pressure: 152/76 (08/09/22 1630)  Pulse: 89 (08/09/22 1629)  Temperature: 98 5 °F (36 9 °C) (08/09/22 1629)  Temp Source: Temporal (08/09/22 1629)  Respirations: 18 (08/09/22 1629)  Height: 5' 10" (177 8 cm) (08/09/22 2127)  Weight - Scale: 93 4 kg (206 lb) (08/09/22 2127)  SpO2: 97 % (08/09/22 1629)    Current Vitals:   Blood Pressure: 161/78 (08/10/22 0624)  Pulse: 87 (08/10/22 0624)  Temperature: 98 9 °F (37 2 °C) (08/10/22 0624)  Temp Source: Oral (08/10/22 8361)  Respirations: 18 (08/09/22 2000)  Height: 5' 10" (177 8 cm) (08/09/22 2127)  Weight - Scale: 93 4 kg (206 lb) (08/09/22 2127)  SpO2: 94 % (08/10/22 0624)        /78 (BP Location: Left arm)   Pulse 87   Temp 98 9 °F (37 2 °C) (Oral)   Resp 18   Ht 5' 10" (1 778 m)   Wt 93 4 kg (206 lb)   SpO2 94%   BMI 29 56 kg/m²     General Appearance:    Alert, cooperative, no distress, resting comfortably in bed  Head:    Normocephalic, without obvious abnormality, atraumatic   Eyes:    PERRL, conjunctiva/corneas clear, EOM's intact            Nose:   Moist mucous membranes, no drainage or sinus tenderness   Throat:   No tenderness, no exudates   Neck:   Supple, symmetrical, trachea midline, no JVD   Back:     Symmetric, no CVA tenderness   Lungs:     Respirations unlabored   Chest wall:    No tenderness or deformity   Heart:    Rate and rhythm noted on last vitals  Abdomen:     Soft, non-tender, bowel sounds active all four quadrants,     no masses, no organomegaly       Lower Ext/Ortho: Previous injury as a child resulting in major rearfoot arthrodesis  (triple arthrodesis, and navicular/cuneiform)  No other gross deformities of either lower extremity noted  Neuro/Vasc: CNII-XII intact   Normal strength  Sensation and reflexes:  Diminished epicritic sensations with loss of protective sensation consistent with advanced diabetic neuropathy  Pulses: Right: DP 1/4, PT 0/4, Left: DP 1/4, PT 0/4  Capillary Filling:  3 Sec, Edema:  +1 left forefoot     Skin: Texture, Tone and Turgor:  Diminished, Digital Hair:  None  Atrophic Changes:  Mild   Lesions:  None  Nail Pathology:  Mild dystrophic changes consistent with mycosis, no tonny-ungual bacterial infections noted  Ulcers/Wounds:  Left foot sub 1st MPJ full-thickness ulcerative breakdown with 3 specific deep areas wounds do not probe deep more than 3 mm that were covered by superficial necrotic slough, purulent drainage noted from 2/3 deep locations consisting of 2-3 small drops  Wound culture obtained  Lab Results:   CBC w/diff  Results from last 7 days   Lab Units 08/10/22  0456   WBC Thousand/uL 12 07*   HEMOGLOBIN g/dL 12 5   HEMATOCRIT % 40 4   PLATELETS Thousands/uL 364   NEUTROS PCT % 74   LYMPHS PCT % 15   MONOS PCT % 8   EOS PCT % 2     BMP  Results from last 7 days   Lab Units 08/10/22  0456   POTASSIUM mmol/L 3 0*   CHLORIDE mmol/L 97   CO2 mmol/L 32   BUN mg/dL 6   CREATININE mg/dL 0 82   CALCIUM mg/dL 9 3     CMP  Results from last 7 days   Lab Units 08/10/22  0456 08/09/22  1907   POTASSIUM mmol/L 3 0* 3 5   CHLORIDE mmol/L 97 93*   CO2 mmol/L 32 33*   BUN mg/dL 6 7   CREATININE mg/dL 0 82 0 91   CALCIUM mg/dL 9 3 9 5   ALK PHOS U/L  --  107   ALT U/L  --  15   AST U/L  --  25     @Culture@  Lab Results   Component Value Date    BLOODCX Received in Microbiology Lab  Culture in Progress  08/09/2022    BLOODCX Received in Microbiology Lab  Culture in Progress  08/09/2022     No results found for: WOUNDCULT      Imaging: I have personally reviewed pertinent films in PACS      EKG, Pathology, and Other Studies: I have personally reviewed pertinent reports        Code Status: Level 1 - Full Code  Advance Directive and Living Will:      Power of :      Please Note: Voice to text dictation software was used in the creation of this document         Ct Shown, DPM

## 2022-08-10 NOTE — ASSESSMENT & PLAN NOTE
· Presented due to new ulcer on his inner left foot with swelling and erythema  Saw podiatry outpatient on 8/9 who recommended he go to the ER for osteomyelitis workup     · Denies fevers or chills   · Met sepsis criteria due to tachycardia, WBC 15 25   · Lactic and procalcitonin WNL  · CRP 88 6  · Sed rate 83  · Home regimen: Glipizide 10 mg bid, Admelog bid, jardiance and metformin   · Hold oral hypoglycemics   · SSI   · A1c pending  · Xray: No radiographic evidence of osteomyelitis  · Continue IV cefepime and vancomycin  · Consult Podiatry   (P) 100

## 2022-08-10 NOTE — ASSESSMENT & PLAN NOTE
· Home regimen: losartan-HCTZ 100-25 mg daily, Nifedipine 90 mg daily, propranolol 160 mg daily   · Hypertensive in the /86  · IV Hydralazine PRN   · Continue to monitor

## 2022-08-10 NOTE — ASSESSMENT & PLAN NOTE
· Presented due to new ulcer on his inner left foot with swelling and erythema  Saw podiatry outpatient on 8/9 who recommended he go to the ER for osteomyelitis workup  · Denies fevers or chills   · Met sepsis criteria due to tachycardia, WBC 15 25   · Lactic and procalcitonin WNL  · CRP 88 6  · Sed rate 83  · Home regimen: Glipizide 10 mg bid, Admelog bid, jardiance and metformin   · Hold oral hypoglycemics   · SSI   · Xray: No radiographic evidence of osteomyelitis  · Started on IV cefepime and vancomycin due to concern for osteo, continue for now  Consider deescalating   · Consult Podiatry   Defer to podiatry if MRI is warranted with negative xray

## 2022-08-10 NOTE — ASSESSMENT & PLAN NOTE
· Met sepsis due to tachycardia, WBC 15 25  Source of infection cellulitis of diabetic ulcer on left foot  R/O osteo   · Lactic WNL     · Procal 0 08   · IV cefepime and IV vancomycin  · BC pending

## 2022-08-10 NOTE — CASE MANAGEMENT
Case Management Assessment & Discharge Planning Note    Patient name Akosua Sender  Location [address] [id number] MRN [id number]  : 1955 Date 8/10/2022       Current Admission Date: 2022  Current Admission Diagnosis:Diabetic ulcer of left foot associated with type 2 diabetes mellitus [facility])   Patient Active Problem List    Diagnosis Date Noted    Diabetic ulcer of left foot associated with type 2 diabetes mellitus ([address] ) 2022    Sepsis ([address] ) 2022    Hypertensive urgency 2022    Bronchitis, mucopurulent recurrent ([address] ) 2022    Cirrhosis, alcoholic ([facility] )     Diabetic peripheral neuropathy ([facility] ) 2022    Herniated nucleus pulposus of lumbosacral region 2022    Thyroid cancer ([facility] ) 2021    Alcoholism in remission ([facility] ) 2021    Benzodiazepine dependence ([facility] ) 2021    Bilateral adhesive capsulitis of shoulders 2021    DM (diabetes mellitus) ([facility] ) 2021    Hypercholesterolemia 2021    Lumbar spondylosis 2021      LOS (days): 1  Geometric Mean LOS (GMLOS) (days): 3 50  Days to GMLOS:2 7     OBJECTIVE:    Risk of Unplanned Readmission Score: 14 7         Current admission status: Inpatient       Preferred Pharmacy:   [facility] [address], [address] [facility] [address]   [address]   [address] [place] [address]  Phone: [phone] Fax: [phone]    Primary Care Provider: Sherrill Lowe DO    Primary Insurance: MEDICARE  Secondary Insurance: [facility]:  [address], [address] - Spouse   Primary Phone: [phone] (Home)               Advance Directives  Does patient have a [address]?: No  Was patient offered paperwork?: Yes (provided)  Does patient currently have a Health Care decision maker?: Yes, please see Health Care Proxy section  Does patient have Advance Directives?: No  Was patient offered paperwork?: Yes (provided)         Readmission Root Cause  30 Day Readmission: No    Patient Information  Admitted from[de-identified] Home  Mental Status: Alert  During Assessment patient was accompanied by: Not accompanied during assessment  Assessment information provided by[de-identified] Patient  Primary Caregiver: Self  Support Systems: Spouse/significant other  Home entry access options   Select all that apply : No steps to enter home  Type of Current Residence: 2 story home  Upon entering residence, is there a bedroom on the main floor (no further steps)?: No  A bedroom is located on the following floor levels of residence (select all that apply):: 2nd Floor  Upon entering residence, is there a bathroom on the main floor (no further steps)?: No  Indicate which floors of current residence have a bathroom (select all the apply):: 2nd Floor  Number of steps to 2nd floor from main floor: One Flight  In the last 12 months, was there a time when you were not able to pay the mortgage or rent on time?: No  In the last 12 months, how many places have you lived?: 1  In the last 12 months, was there a time when you did not have a steady place to sleep or slept in a shelter (including now)?: No  Homeless/housing insecurity resource given?: N/A  Living Arrangements: Lives w/ Spouse/significant other    Activities of Daily Living Prior to Admission  Functional Status: Independent  Completes ADLs independently?: Yes  Ambulates independently?: Yes  Does patient use assisted devices?: No  Does patient currently own DME?: No  Does patient have a history of Outpatient Therapy (PT/OT)?: No  Does the patient have a history of Short-Term Rehab?: No  Does patient have a history of HHC?: Yes (unsure the agency name)  Does patient currently have [address]?: No         Patient Information Continued  Does patient have prescription coverage?: Yes  Within the past 12 months, you worried that your food would run out before you got the money to buy more : Never true  Within the past 12 months, the food you bought just didn't last and you didn't have money to get more : Never true  Food insecurity resource given?: N/A  Does patient receive dialysis treatments?: No  Does patient have a history of substance abuse?: Yes  Historical substance use preference: Alcohol/ETOH  Is patient currently in treatment for substance abuse?: N/A - sober  Does patient have a history of Mental Health Diagnosis?: Yes  Is patient receiving treatment for mental health?: Yes (receives treatment at [facility])  Has patient received inpatient treatment related to mental health in the last 2 years?: No         Means of Transportation  Means of Transport to Appts[de-identified] Drives Self  In the past 12 months, has lack of transportation kept you from medical appointments or from getting medications?: No  In the past 12 months, has lack of transportation kept you from meetings, work, or from getting things needed for daily living?: No  Was application for public transport provided?: N/A        DISCHARGE DETAILS:    Discharge planning discussed with[de-identified] patient  Freedom of Choice: Yes  Comments - Freedom of Choice: no anticipated dc needs  CM contacted family/caregiver?: No- see comments (declined; reports being in contact with family)  Were Treatment Team discharge recommendations reviewed with patient/caregiver?: Yes  Did patient/caregiver verbalize understanding of patient care needs?: Yes  Were patient/caregiver advised of the risks associated with not following Treatment Team discharge recommendations?: Yes    [address]         Is the patient interested in [facility] at discharge?: No    DME Referral Provided  Referral made for DME?: No    Treatment Team Recommendation: Home  Discharge Destination Plan[de-identified] Home  Transport at Discharge : Family     Additional Comments: Met with pt to discuss the role of CM and to discuss any help pt may need prior to dc   Pt lives iwth his wife Kemi Brody and 2 sons in a 2 story home with no CECILIO; bed/bathroom on the 2nd floor  Pt performed ADL's indptly pta, no use of DME  Pt has a hx of HHC but unsure of agency  Pt has a hx of alcohol use from 1981  Pt uses medical marijuana; not daily  Pt has mental health hx; pt is seen through [facility] for the past 22years for depression and anxiety  Pt drives  Pt's family will transport home at dc

## 2022-08-10 NOTE — OCCUPATIONAL THERAPY NOTE
Occupational Therapy Cx Note     Patient Name: Jeffery Ricketts  Today's Date: 8/10/2022  Problem List  Principal Problem:    Diabetic ulcer of left foot associated with type 2 diabetes mellitus ([facility] )  Active Problems:    Hypercholesterolemia    Sepsis ([facility] )    Hypertensive urgency          08/10/22 1044   OT Last Visit   OT Visit Date 08/10/22   Note Type   Note type Cancelled Session   Additional Comments OT orders received  Chart reviewed  Awaiting podiatry consult/recommendationsdue to concern for osteomyelitis  Will hold and follow when appropriate             Butch Mccloud OTR/L

## 2022-08-10 NOTE — H&P
[address]  H&P- Aroldo Crane 1955, 74 y o  male MRN: [id number]  Unit/Bed#: -Fidel Encounter: [id number]  Primary Care Provider: Eloisa Moreno DO   Date and time admitted to hospital: 8/9/2022  6:29 PM    * Diabetic ulcer of left foot associated with type 2 diabetes mellitus ([place] [address] )  Assessment & Plan  · Presented due to new ulcer on his inner left foot with swelling and erythema  Saw podiatry outpatient on 8/9 who recommended he go to the ER for osteomyelitis workup  · Denies fevers or chills   · Met sepsis criteria due to tachycardia, WBC 15 25   · Lactic and procalcitonin WNL  · CRP 88 6  · Sed rate 83  · Home regimen: Glipizide 10 mg bid, Admelog bid, jardiance and metformin   · Hold oral hypoglycemics   · SSI   · Xray: No radiographic evidence of osteomyelitis  · Started on IV cefepime and vancomycin due to concern for osteo, continue for now  Consider deescalating   · Consult Podiatry  Defer to podiatry if MRI is warranted with negative xray               Sepsis ([place] [address] )  Assessment & Plan  · Met sepsis due to tachycardia, WBC 15 25  Source of infection cellulitis of diabetic ulcer on left foot  R/O osteo   · Lactic WNL  Procal pending   · IV cefepime and IV vancomycin  · BC pending     Hypertensive urgency  Assessment & Plan  · Home regimen: losartan-HCTZ 100-25 mg daily, Nifedipine 90 mg daily, propranolol 160 mg daily   · Hypertensive in the /86  · IV Hydralazine PRN   · Continue to monitor     Hypercholesterolemia  Assessment & Plan  · Continue statin     VTE Pharmacologic Prophylaxis: VTE Score: 3 Moderate Risk (Score 3-4) - Pharmacological DVT Prophylaxis Ordered: heparin  Code Status: Level 1 - Full Code   Discussion with family: Patient declined call to   Anticipated Length of Stay: Patient will be admitted on an inpatient basis with an anticipated length of stay of greater than 2 midnights secondary to Diabetic ulcer      Total Time for Visit, including Counseling / Coordination of Care: 60 minutes Greater than 50% of this total time spent on direct patient counseling and coordination of care  Chief Complaint: Foot wound     History of Present Illness:  Jessica Delgado is a 74 y o  male with a PMH of type 2 diabetes, hyperlipidemia, hypertension who presents with swelling and erythema of his left foot  Patient with new diabetic ulcer wound on his inner left foot  Saw his podiatrist today on 08/09 who wanted him to go to the ER for possible osteomyelitis  Denies fevers or chills at home  Compliant with home medications  Review of Systems:  Review of Systems   Constitutional: Negative for fatigue and fever  HENT: Negative for sore throat  Respiratory: Negative for cough, chest tightness and shortness of breath  Cardiovascular: Negative for chest pain  Gastrointestinal: Negative for abdominal distention, abdominal pain, diarrhea, nausea and vomiting  Genitourinary: Negative for difficulty urinating  Musculoskeletal: Negative for arthralgias  Skin: Positive for color change  Left foot swelling   Neurological: Negative for weakness and headaches  Psychiatric/Behavioral: Negative for agitation and behavioral problems  All other systems reviewed and are negative  Past Medical and Surgical History:   Past Medical History:   Diagnosis Date    Anxiety     Arthritis     Cancer ([facility] )     Depression     [facility]     Hypertension     Liver disease     Mitral valve prolapse     Thyroid disease        Past Surgical History:   Procedure Laterality Date    JOINT REPLACEMENT Left 03/11/2022    Left TSA    THYROID SURGERY  2021    remove cancer       Meds/Allergies:  Prior to Admission medications    Medication Sig Start Date End Date Taking?  Authorizing Provider   MONICA FABIANO PLUS test strip 4 (four) times a day 12/17/18   Historical Provider, MD ANDERSON FASTCLIX LANCETS MISC 4 (four) times a day 12/22/18   Historical Provider, MD   ADMELOG 100 UNIT/ML injection inject 25 UNITS SUBCUTANEOUSLY DAILY 1/2/19   Historical Provider, MD   ARIPiprazole (ABILIFY) 30 mg tablet Take 30 mg by mouth daily at bedtime  Patient not taking: Reported on 7/29/2022 12/17/18   Historical Provider, MD   cetirizine (ZyrTEC) 10 mg tablet Take 10 mg by mouth daily 12/17/18   Historical Provider, MD   clonazePAM (KlonoPIN) 1 mg tablet TAKE 1 TABLET BY MOUTH IN THE MORNING AND TAKE 2 TABLETS BY MOUTH AT BEDTIME 12/23/18   Historical Provider, MD   cyclobenzaprine (FLEXERIL) 10 mg tablet Take 10 mg by mouth 3 (three) times a day 11/21/18   Historical Provider, MD   [address] [place] 250 MCG tablet TAKE 1 TABLET BY MOUTH EVERY DAY BUT DO NOT TAKE ON [address] OR WEDNESDAY  Patient not taking: Reported on 7/29/2022 12/17/18   Historical Provider, MD   DULoxetine (CYMBALTA) 20 mg capsule Take 20 mg by mouth daily    Historical Provider, MD   fluticasone (FLONASE) 50 mcg/act nasal spray 2 sprays daily As directed 12/17/18   Historical Provider, MD   folic acid (FOLVITE) 1 mg tablet Take 2,000 mcg by mouth daily 12/17/18   Historical Provider, MD   glipiZIDE (GLUCOTROL XL) 10 mg 24 hr tablet Take 10 mg by mouth 2 (two) times a day 12/17/18   Historical Provider, MD   GLOBAL INJECT EASE INSULIN SYR 31G X 5/16" 1 ML MISC 2 (two) times a day 12/22/18   Historical Provider, MD   GNP ASPIRIN LOW DOSE 81 MG EC tablet Take 81 mg by mouth daily 12/17/18   Historical Provider, MD   HUMULIN N 100 UNIT/ML subcutaneous injection INJECT 40 UNITS IN THE MORNING AND 6 UNITS in THE IN THE EVENING AS DIRECTED 12/27/18   Historical Provider, MD   ibuprofen (MOTRIN) 400 mg tablet Take 800 mg by mouth 3 (three) times a day 11/21/18   Historical Provider, MD   ketoconazole (NIZORAL) 2 % shampoo daily Use as directed 10/10/18   Historical Provider, MD   losartan-hydrochlorothiazide (HYZAAR) 100-25 MG per tablet Take 1 tablet by mouth daily 12/17/18   Historical Provider, MD   lovastatin (MEVACOR) 20 mg tablet Take 20 mg by mouth daily 12/17/18   Historical Provider, MD   metFORMIN (GLUCOPHAGE) 1000 MG tablet  1/2/19   Historical Provider, MD   mirtazapine (REMERON) 45 MG tablet Take 45 mg by mouth daily at bedtime 12/11/18   Historical Provider, MD   naproxen (NAPROSYN) 500 mg tablet Take 1 tablet (500 mg total) by mouth 2 (two) times a day with meals  Patient not taking: Reported on 7/29/2022 1/6/20   Devon Spencer PA-C   NIFEdipine (PROCARDIA XL) 90 mg 24 hr tablet Take 90 mg by mouth daily 12/17/18   Historical Provider, MD   ondansetron (ZOFRAN) 4 mg tablet Take 1 tablet (4 mg total) by mouth every 6 (six) hours as needed for nausea or vomiting  Patient not taking: Reported on 7/29/2022 4/29/22   Alex Brito DO   propranolol (INDERAL LA) 160 mg Take 160 mg by mouth daily 12/17/18   Historical Provider, MD     I have reviewed home medications with patient personally  Allergies: Allergies   Allergen Reactions    Cephalexin Rash    Abilify [Aripiprazole] Other (See Comments)     Shaking      Molds & Smuts        Social History:  Marital Status: /Civil Union   Occupation:  Unknown  Patient Pre-hospital Living Situation: Home  Patient Pre-hospital Level of Mobility: walks  Patient Pre-hospital Diet Restrictions:  Diabetic  Substance Use History:   Social History     Substance and Sexual Activity   Alcohol Use Not Currently    Comment: has not drank since january 2022     Social History     Tobacco Use   Smoking Status Former Smoker    Packs/day: 0 25   Smokeless Tobacco Never Used   Tobacco Comment    quit 1981     Social History     Substance and Sexual Activity   Drug Use Not Currently    Types: Marijuana       Family History:  History reviewed  No pertinent family history      Physical Exam:     Vitals:   Blood Pressure: (!) 184/86 (08/09/22 2127)  Pulse: 79 (08/09/22 2127)  Temperature: 97 6 °F (36 4 °C) (08/09/22 2127)  Temp Source: Oral (08/09/22 2127)  Respirations: 18 (08/09/22 2000)  Height: 5' 10" (177 8 cm) (08/09/22 2127)  Weight - Scale: 93 4 kg (206 lb) (08/09/22 2127)  SpO2: 98 % (08/09/22 2127)    Physical Exam  Vitals and nursing note reviewed  Constitutional:       Appearance: Normal appearance  He is obese  HENT:      Head: Normocephalic  Eyes:      Extraocular Movements: Extraocular movements intact  Pupils: Pupils are equal, round, and reactive to light  Cardiovascular:      Rate and Rhythm: Normal rate and regular rhythm  Heart sounds: No murmur heard  No gallop  Pulmonary:      Effort: No respiratory distress  Breath sounds: Normal breath sounds  No wheezing  Abdominal:      General: Bowel sounds are normal  There is no distension  Tenderness: There is no abdominal tenderness  Musculoskeletal:         General: Swelling present  Normal range of motion  Cervical back: Normal range of motion  Skin:     General: Skin is warm  Findings: Erythema and lesion present  Neurological:      General: No focal deficit present  Mental Status: He is alert and oriented to person, place, and time  Mental status is at baseline  Psychiatric:         Mood and Affect: Mood normal          Behavior: Behavior normal          Thought Content:  Thought content normal           Additional Data:     Lab Results:  Results from last 7 days   Lab Units 08/09/22  1907   WBC Thousand/uL 15 25*   HEMOGLOBIN g/dL 13 8   HEMATOCRIT % 43 7   PLATELETS Thousands/uL 453*   NEUTROS PCT % 76*   LYMPHS PCT % 14   MONOS PCT % 7   EOS PCT % 2     Results from last 7 days   Lab Units 08/09/22  1907   SODIUM mmol/L 136   POTASSIUM mmol/L 3 5   CHLORIDE mmol/L 93*   CO2 mmol/L 33*   BUN mg/dL 7   CREATININE mg/dL 0 91   ANION GAP mmol/L 10   CALCIUM mg/dL 9 5   ALBUMIN g/dL 3 8   TOTAL BILIRUBIN mg/dL 0 50   ALK PHOS U/L 107   ALT U/L 15   AST U/L 25   GLUCOSE RANDOM mg/dL 100         Results from last 7 days   Lab Units 08/10/22  0001   POC GLUCOSE mg/dl 100         Results from last 7 days   Lab Units 08/09/22 2041 08/09/22 2040   LACTIC ACID mmol/L  --  1 2   PROCALCITONIN ng/ml 0 09  --        Imaging: Reviewed radiology reports from this admission including: xray(s)  XR foot 3+ views LEFT   Final Result by Rory Camargo MD (08/09 2052)   No radiographic evidence of osteomyelitis  Workstation performed: [id number]             ** Please Note: This note has been constructed using a voice recognition system   **

## 2022-08-10 NOTE — PLAN OF CARE
Problem: Potential for Falls  Goal: Patient will remain free of falls  Description: INTERVENTIONS:  - Educate patient/family on patient safety including physical limitations  - Instruct patient to call for assistance with activity   - Consult OT/PT to assist with strengthening/mobility   - Keep Call bell within reach  - Keep bed low and locked with side rails adjusted as appropriate  - Keep care items and personal belongings within reach  - Initiate and maintain comfort rounds  - Make Fall Risk Sign visible to staff  - Offer Toileting every 2 Hours, in advance of need  - Initiate/Maintain bed alarm  - Obtain necessary fall risk management equipment:   - Apply yellow socks and bracelet for high fall risk patients  - Consider moving patient to room near nurses station  Outcome: Progressing     Problem: METABOLIC, FLUID AND ELECTROLYTES - ADULT  Goal: Electrolytes maintained within normal limits  Description: INTERVENTIONS:  - Monitor labs and assess patient for signs and symptoms of electrolyte imbalances  - Administer electrolyte replacement as ordered  - Monitor response to electrolyte replacements, including repeat lab results as appropriate  - Instruct patient on fluid and nutrition as appropriate  Outcome: Progressing     Problem: SKIN/TISSUE INTEGRITY - ADULT  Goal: Skin Integrity remains intact(Skin Breakdown Prevention)  Description: Assess:  -Perform Sanjay assessment  -Clean and moisturize skin  -Inspect skin when repositioning, toileting, and assisting with ADLS  -Assess under medical devices  -Assess extremities for adequate circulation and sensation     Bed Management:  -Have minimal linens on bed & keep smooth, unwrinkled  -Change linens as needed when moist or perspiring  -Avoid sitting or lying in one position for more than 2 hours while in bed  -Keep HOB at 30 degrees     Toileting:  -Offer bedside commode  -Assess for incontinence  -Use incontinent care products after each incontinent episode    Activity:  -Mobilize patient 2 times a day  -Encourage activity and walks on unit  -Encourage or provide ROM exercises   -Turn and reposition patient every 2 Hours  -Use appropriate equipment to lift or move patient in bed  -Instruct/ Assist with weight shifting when out of bed in chair  -Consider limitation of chair time 2 hour intervals    Skin Care:  -Avoid use of baby powder, tape, friction and shearing, hot water or constrictive clothing  -Relieve pressure over bony prominences  -Do not massage red bony areas    Next Steps:  -Teach patient strategies to minimize risks   -Consider consults to  interdisciplinary teams   Outcome: Progressing  Goal: Incision(s), wounds(s) or drain site(s) healing without S/S of infection  Description: INTERVENTIONS  - Assess and document dressing, incision, wound bed, drain sites and surrounding tissue  - Provide patient and family education  - Perform skin care/dressing changes  Outcome: Progressing     Problem: HEMATOLOGIC - ADULT  Goal: Maintains hematologic stability  Description: INTERVENTIONS  - Assess for signs and symptoms of bleeding or hemorrhage  - Monitor labs  - Administer supportive blood products/factors as ordered and appropriate  Outcome: Progressing     Problem: MUSCULOSKELETAL - ADULT  Goal: Maintain or return mobility to safest level of function  Description: INTERVENTIONS:  - Assess patient's ability to carry out ADLs; assess patient's baseline for ADL function and identify physical deficits which impact ability to perform ADLs (bathing, care of mouth/teeth, toileting, grooming, dressing, etc )  - Assess/evaluate cause of self-care deficits   - Assess range of motion  - Assess patient's mobility  - Assess patient's need for assistive devices and provide as appropriate  - Encourage maximum independence but intervene and supervise when necessary  - Involve family in performance of ADLs  - Assess for home care needs following discharge   - Consider OT consult to assist with ADL evaluation and planning for discharge  - Provide patient education as appropriate  Outcome: Progressing     Problem: PAIN - ADULT  Goal: Verbalizes/displays adequate comfort level or baseline comfort level  Description: Interventions:  - Encourage patient to monitor pain and request assistance  - Assess pain using appropriate pain scale  - Administer analgesics based on type and severity of pain and evaluate response  - Implement non-pharmacological measures as appropriate and evaluate response  - Consider cultural and social influences on pain and pain management  - Notify physician/advanced practitioner if interventions unsuccessful or patient reports new pain  Outcome: Progressing     Problem: INFECTION - ADULT  Goal: Absence or prevention of progression during hospitalization  Description: INTERVENTIONS:  - Assess and monitor for signs and symptoms of infection  - Monitor lab/diagnostic results  - Monitor all insertion sites, i e  indwelling lines, tubes, and drains  - Monitor endotracheal if appropriate and nasal secretions for changes in amount and color  - Vega appropriate cooling/warming therapies per order  - Administer medications as ordered  - Instruct and encourage patient and family to use good hand hygiene technique  - Identify and instruct in appropriate isolation precautions for identified infection/condition  Outcome: Progressing     Problem: SAFETY ADULT  Goal: Patient will remain free of falls  Description: INTERVENTIONS:  - Educate patient/family on patient safety including physical limitations  - Instruct patient to call for assistance with activity   - Consult OT/PT to assist with strengthening/mobility   - Keep Call bell within reach  - Keep bed low and locked with side rails adjusted as appropriate  - Keep care items and personal belongings within reach  - Initiate and maintain comfort rounds  - Make Fall Risk Sign visible to staff  - Offer Toileting every 2 Hours, in advance of need  - Initiate/Maintain bed alarm  - Obtain necessary fall risk management equipment:   - Apply yellow socks and bracelet for high fall risk patients  - Consider moving patient to room near nurses station  Outcome: Progressing     Problem: DISCHARGE PLANNING  Goal: Discharge to home or other facility with appropriate resources  Description: INTERVENTIONS:  - Identify barriers to discharge w/patient and caregiver  - Arrange for needed discharge resources and transportation as appropriate  - Identify discharge learning needs (meds, wound care, etc )  - Arrange for interpretive services to assist at discharge as needed  - Refer to Case Management Department for coordinating discharge planning if the patient needs post-hospital services based on physician/advanced practitioner order or complex needs related to functional status, cognitive ability, or social support system  Outcome: Progressing     Problem: Knowledge Deficit  Goal: Patient/family/caregiver demonstrates understanding of disease process, treatment plan, medications, and discharge instructions  Description: Complete learning assessment and assess knowledge base  Interventions:  - Provide teaching at level of understanding  - Provide teaching via preferred learning methods  Outcome: Progressing     Problem: Nutrition/Hydration-ADULT  Goal: Nutrient/Hydration intake appropriate for improving, restoring or maintaining nutritional needs  Description: Monitor and assess patient's nutrition/hydration status for malnutrition  Collaborate with interdisciplinary team and initiate plan and interventions as ordered  Monitor patient's weight and dietary intake as ordered or per policy  Utilize nutrition screening tool and intervene as necessary  Determine patient's food preferences and provide high-protein, high-caloric foods as appropriate       INTERVENTIONS:  - Monitor oral intake, urinary output, labs, and treatment plans  - Assess nutrition and hydration status and recommend course of action  - Evaluate amount of meals eaten  - Assist patient with eating if necessary   - Allow adequate time for meals  - Recommend/ encourage appropriate diets, oral nutritional supplements, and vitamin/mineral supplements  - Order, calculate, and assess calorie counts as needed  - Recommend, monitor, and adjust tube feedings and TPN/PPN based on assessed needs  - Assess need for intravenous fluids  - Provide specific nutrition/hydration education as appropriate  - Include patient/family/caregiver in decisions related to nutrition  Outcome: Progressing

## 2022-08-10 NOTE — PROGRESS NOTES
Vancomycin IV Pharmacy-to-Dose Consultation    Steven Olivas is a 74 y o  male who is currently receiving Vancomycin IV with management by the Pharmacy Consult service  Assessment/Plan:  The patient was reviewed  Renal function is stable and no signs or symptoms of nephrotoxicity and/or infusion reactions were documented in the chart  Based on todays assessment, continue current vancomycin (day # 2) dosing of 1750mg IV Q12H, with a plan for trough to be drawn at 0900 on 8/11/22  We will continue to follow the patients culture results and clinical progress daily      Kolton Aguirre, Pharmacist

## 2022-08-10 NOTE — PROGRESS NOTES
[place]  Progress Note - Silvio Edward 1955, 74 y o  male MRN: [id number]  Unit/Bed#: -01 Encounter: [id number]  Primary Care Provider: Vicki Sotomayor DO   Date and time admitted to hospital: 8/9/2022  6:29 PM    * Diabetic ulcer of left foot associated with type 2 diabetes mellitus ([facility] )  Assessment & Plan  · Presented due to new ulcer on his inner left foot with swelling and erythema  Saw podiatry outpatient on 8/9 who recommended he go to the ER for osteomyelitis workup  · Denies fevers or chills   · Met sepsis criteria due to tachycardia, WBC 15 25   · Lactic and procalcitonin WNL  · CRP 88 6  · Sed rate 83  · Home regimen: Glipizide 10 mg bid, Admelog bid, jardiance and metformin   · Hold oral hypoglycemics   · SSI   · A1c pending  · Xray: No radiographic evidence of osteomyelitis  · Continue IV cefepime and vancomycin  · Consult Podiatry   (P) 100            Hypertensive urgency  Assessment & Plan  · Home regimen: losartan-HCTZ 100-25 mg daily, Nifedipine 90 mg daily, propranolol 160 mg daily   · Hypertensive in the /86  · IV Hydralazine PRN   · Continue to monitor     Sepsis ([facility] )  Assessment & Plan  · Met sepsis due to tachycardia, WBC 15 25  Source of infection cellulitis of diabetic ulcer on left foot  R/O osteo   · Lactic WNL  · Procal 0 08   · IV cefepime and IV vancomycin  · BC pending     Hypercholesterolemia  Assessment & Plan  · Continue statin       VTE Pharmacologic Prophylaxis: VTE Score: 3 Moderate Risk (Score 3-4) - Pharmacological DVT Prophylaxis Ordered: heparin  Patient Centered Rounds: I performed bedside rounds with nursing staff today  Discussions with Specialists or Other Care Team Provider: Podiatry, Pt, OT, CM    Education and Discussions with Family / Patient: pt  Time Spent for Care: 30 minutes  More than 50% of total time spent on counseling and coordination of care as described above      Current Length of Stay: 1 day(s)  Current Patient Status: Inpatient   Certification Statement: The patient will continue to require additional inpatient hospital stay due to ulcer  Discharge Plan: Anticipate discharge in 48-72 hrs to home  Code Status: Level 1 - Full Code    Subjective:   Sergio Jacobo is seen in and at bedside  No acute events overnight  Has no current concerns or complaints all symptoms on review of systems was negative discussed plan of care  All questions were answered and addressed    Objective:     Vitals:   Temp (24hrs), Av 3 °F (36 8 °C), Min:97 6 °F (36 4 °C), Max:98 9 °F (37 2 °C)    Temp:  [97 6 °F (36 4 °C)-98 9 °F (37 2 °C)] 98 9 °F (37 2 °C)  HR:  [78-89] 87  Resp:  [18] 18  BP: (152-184)/(76-88) 161/78  SpO2:  [94 %-98 %] 94 %  Body mass index is 29 56 kg/m²  Input and Output Summary (last 24 hours):   No intake or output data in the 24 hours ending 08/10/22 1250    Physical Exam:   Physical Exam  Vitals and nursing note reviewed  Constitutional:       Appearance: Normal appearance  He is obese  HENT:      Head: Normocephalic and atraumatic  Mouth/Throat:      Comments: Poor dentition  Cardiovascular:      Rate and Rhythm: Normal rate and regular rhythm  Pulses: Normal pulses  Heart sounds: Normal heart sounds  Pulmonary:      Effort: Pulmonary effort is normal       Breath sounds: Normal breath sounds  Abdominal:      General: Abdomen is flat  Bowel sounds are normal       Palpations: Abdomen is soft  Musculoskeletal:      Right lower leg: No edema  Left lower leg: Edema present  Skin:     General: Skin is warm  Findings: Erythema present  Neurological:      General: No focal deficit present  Mental Status: He is alert and oriented to person, place, and time            Additional Data:     Labs:  Results from last 7 days   Lab Units 08/10/22  0456   WBC Thousand/uL 12 07*   HEMOGLOBIN g/dL 12 5   HEMATOCRIT % 40 4   PLATELETS Thousands/uL 364   NEUTROS PCT % 74   LYMPHS PCT % 15   MONOS PCT % 8   EOS PCT % 2     Results from last 7 days   Lab Units 08/10/22  0456 08/09/22  1907   SODIUM mmol/L 136 136   POTASSIUM mmol/L 3 0* 3 5   CHLORIDE mmol/L 97 93*   CO2 mmol/L 32 33*   BUN mg/dL 6 7   CREATININE mg/dL 0 82 0 91   ANION GAP mmol/L 7 10   CALCIUM mg/dL 9 3 9 5   ALBUMIN g/dL  --  3 8   TOTAL BILIRUBIN mg/dL  --  0 50   ALK PHOS U/L  --  107   ALT U/L  --  15   AST U/L  --  25   GLUCOSE RANDOM mg/dL 118 100         Results from last 7 days   Lab Units 08/10/22  1118 08/10/22  0740 08/10/22  0001   POC GLUCOSE mg/dl 85 120 100         Results from last 7 days   Lab Units 08/10/22  0456 08/09/22  2041 08/09/22  2040   LACTIC ACID mmol/L  --   --  1 2   PROCALCITONIN ng/ml 0 08 0 09  --        Lines/Drains:  Invasive Devices  Report    Peripheral Intravenous Line  Duration           Peripheral IV 08/09/22 Left Antecubital <1 day                      Imaging: Reviewed radiology reports from this admission including: xray(s)    Recent Cultures (last 7 days):   Results from last 7 days   Lab Units 08/09/22 2046 08/09/22  1900   BLOOD CULTURE  Received in Microbiology Lab  Culture in Progress  Received in Microbiology Lab  Culture in Progress         Last 24 Hours Medication List:   Current Facility-Administered Medications   Medication Dose Route Frequency Provider Last Rate    acetaminophen  650 mg Oral Q6H PRN Elisa Butcher PA-C      aspirin  81 mg Oral Daily Elisa Butcher PA-C      cefepime  2,000 mg Intravenous Q12H Elisa Butcher PA-C 2,000 mg (08/10/22 0944)    clonazePAM  1 mg Oral BID PRN Elisa Butcher PA-C      cyclobenzaprine  10 mg Oral HS PRN Elisa Butcher PA-C      DULoxetine  60 mg Oral BID Elisa Butcher PA-C      fluticasone  2 spray Each Nare Daily Elisa Butcher PA-C      folic acid  2 mg Oral Daily Elisa Butcher PA-C      glipiZIDE  10 mg Oral BID Elisa Butcher PA-C      heparin (porcine)  5,000 Units Subcutaneous [facility] Elisa Butcher PA-C      hydrALAZINE  5 mg Intravenous Q6H PRN Marlynn Coon, PA-C      hydrochlorothiazide  25 mg Oral Daily Marlynn Coon, PA-C      insulin lispro  1-6 Units Subcutaneous TID AC Sailaja ABDIRASHID Salcedo-C      insulin lispro  1-6 Units Subcutaneous HS Marlynn Coon, PA-C      insulin lispro  37 Units Subcutaneous BID With Meals Marrenetta Coon, PA-C      ketorolac  15 mg Intravenous Q6H PRN Mardutchnn Coon, PA-C      lidocaine  1 patch Topical HS Marlynn Coon, PA-C      loratadine  10 mg Oral Daily Marlynn Coon, PA-C      losartan  100 mg Oral Daily Marlynn Coon, PA-C      mirtazapine  45 mg Oral HS Marlynn Coon, PA-C      NIFEdipine  120 mg Oral Daily Marlynn Coon, PA-C      ondansetron  4 mg Intravenous Q6H PRN Marlynn Coon, PA-C      potassium chloride  40 mEq Oral BID Jacki De MD      pravastatin  20 mg Oral Daily With ABDIRASHID Azul-C      propranolol  160 mg Oral Daily Marlynn Coon, PA-C      sertraline  75 mg Oral Daily Marlynn Coon, PA-C      vancomycin  17 5 mg/kg Intravenous Q12H Mariahnn Coon, PA-C 1,750 mg (08/10/22 1041)        Today, Patient Was Seen By: Jacki De MD    **Please Note: This note may have been constructed using a voice recognition system  **

## 2022-08-11 ENCOUNTER — APPOINTMENT (OUTPATIENT)
Dept: PHYSICAL THERAPY | Facility: CLINIC | Age: 67
End: 2022-08-11
Payer: MEDICARE

## 2022-08-11 ENCOUNTER — APPOINTMENT (INPATIENT)
Dept: MRI IMAGING | Facility: HOSPITAL | Age: 67
DRG: 854 | End: 2022-08-11
Payer: MEDICARE

## 2022-08-11 ENCOUNTER — TELEPHONE (OUTPATIENT)
Dept: PAIN MEDICINE | Facility: CLINIC | Age: 67
End: 2022-08-11

## 2022-08-11 LAB
ANION GAP SERPL CALCULATED.3IONS-SCNC: 7 MMOL/L (ref 4–13)
BUN SERPL-MCNC: 5 MG/DL (ref 5–25)
CALCIUM SERPL-MCNC: 9.3 MG/DL (ref 8.3–10.1)
CHLORIDE SERPL-SCNC: 98 MMOL/L (ref 96–108)
CO2 SERPL-SCNC: 31 MMOL/L (ref 21–32)
CREAT SERPL-MCNC: 0.83 MG/DL (ref 0.6–1.3)
ERYTHROCYTE [DISTWIDTH] IN BLOOD BY AUTOMATED COUNT: 14.7 % (ref 11.6–15.1)
GFR SERPL CREATININE-BSD FRML MDRD: 92 ML/MIN/1.73SQ M
GLUCOSE SERPL-MCNC: 126 MG/DL (ref 65–140)
GLUCOSE SERPL-MCNC: 127 MG/DL (ref 65–140)
GLUCOSE SERPL-MCNC: 176 MG/DL (ref 65–140)
GLUCOSE SERPL-MCNC: 194 MG/DL (ref 65–140)
GLUCOSE SERPL-MCNC: 228 MG/DL (ref 65–140)
HCT VFR BLD AUTO: 40.9 % (ref 36.5–49.3)
HGB BLD-MCNC: 12.6 G/DL (ref 12–17)
MAGNESIUM SERPL-MCNC: 1.6 MG/DL (ref 1.6–2.6)
MCH RBC QN AUTO: 26.6 PG (ref 26.8–34.3)
MCHC RBC AUTO-ENTMCNC: 30.8 G/DL (ref 31.4–37.4)
MCV RBC AUTO: 86 FL (ref 82–98)
PLATELET # BLD AUTO: 357 THOUSANDS/UL (ref 149–390)
PMV BLD AUTO: 9.6 FL (ref 8.9–12.7)
POTASSIUM SERPL-SCNC: 3.7 MMOL/L (ref 3.5–5.3)
PROCALCITONIN SERPL-MCNC: 0.07 NG/ML
RBC # BLD AUTO: 4.74 MILLION/UL (ref 3.88–5.62)
SODIUM SERPL-SCNC: 136 MMOL/L (ref 135–147)
VANCOMYCIN TROUGH SERPL-MCNC: 16.9 UG/ML (ref 10–20)
WBC # BLD AUTO: 11.3 THOUSAND/UL (ref 4.31–10.16)

## 2022-08-11 PROCEDURE — 80048 BASIC METABOLIC PNL TOTAL CA: CPT | Performed by: STUDENT IN AN ORGANIZED HEALTH CARE EDUCATION/TRAINING PROGRAM

## 2022-08-11 PROCEDURE — 80202 ASSAY OF VANCOMYCIN: CPT | Performed by: INTERNAL MEDICINE

## 2022-08-11 PROCEDURE — 83735 ASSAY OF MAGNESIUM: CPT | Performed by: STUDENT IN AN ORGANIZED HEALTH CARE EDUCATION/TRAINING PROGRAM

## 2022-08-11 PROCEDURE — 99233 SBSQ HOSP IP/OBS HIGH 50: CPT | Performed by: STUDENT IN AN ORGANIZED HEALTH CARE EDUCATION/TRAINING PROGRAM

## 2022-08-11 PROCEDURE — 73718 MRI LOWER EXTREMITY W/O DYE: CPT

## 2022-08-11 PROCEDURE — 84145 PROCALCITONIN (PCT): CPT | Performed by: STUDENT IN AN ORGANIZED HEALTH CARE EDUCATION/TRAINING PROGRAM

## 2022-08-11 PROCEDURE — G1004 CDSM NDSC: HCPCS

## 2022-08-11 PROCEDURE — 85027 COMPLETE CBC AUTOMATED: CPT | Performed by: STUDENT IN AN ORGANIZED HEALTH CARE EDUCATION/TRAINING PROGRAM

## 2022-08-11 PROCEDURE — 82948 REAGENT STRIP/BLOOD GLUCOSE: CPT

## 2022-08-11 RX ORDER — POTASSIUM CHLORIDE 20 MEQ/1
40 TABLET, EXTENDED RELEASE ORAL ONCE
Status: COMPLETED | OUTPATIENT
Start: 2022-08-11 | End: 2022-08-11

## 2022-08-11 RX ORDER — INSULIN GLARGINE 100 [IU]/ML
37 INJECTION, SOLUTION SUBCUTANEOUS
Status: DISCONTINUED | OUTPATIENT
Start: 2022-08-11 | End: 2022-08-12

## 2022-08-11 RX ORDER — MAGNESIUM SULFATE HEPTAHYDRATE 40 MG/ML
2 INJECTION, SOLUTION INTRAVENOUS ONCE
Status: COMPLETED | OUTPATIENT
Start: 2022-08-11 | End: 2022-08-11

## 2022-08-11 RX ORDER — BACLOFEN 10 MG/1
10 TABLET ORAL 3 TIMES DAILY
Status: COMPLETED | OUTPATIENT
Start: 2022-08-11 | End: 2022-08-13

## 2022-08-11 RX ADMIN — BACLOFEN 10 MG: 10 TABLET ORAL at 17:00

## 2022-08-11 RX ADMIN — CEFEPIME HYDROCHLORIDE 2000 MG: 2 INJECTION, SOLUTION INTRAVENOUS at 09:20

## 2022-08-11 RX ADMIN — FOLIC ACID 2 MG: 1 TABLET ORAL at 09:19

## 2022-08-11 RX ADMIN — BACLOFEN 10 MG: 10 TABLET ORAL at 22:00

## 2022-08-11 RX ADMIN — POTASSIUM CHLORIDE 40 MEQ: 1500 TABLET, EXTENDED RELEASE ORAL at 11:18

## 2022-08-11 RX ADMIN — MAGNESIUM SULFATE HEPTAHYDRATE 2 G: 40 INJECTION, SOLUTION INTRAVENOUS at 11:24

## 2022-08-11 RX ADMIN — LIDOCAINE 5% 1 PATCH: 700 PATCH TOPICAL at 22:27

## 2022-08-11 RX ADMIN — KETOROLAC TROMETHAMINE 15 MG: 30 INJECTION, SOLUTION INTRAMUSCULAR; INTRAVENOUS at 12:20

## 2022-08-11 RX ADMIN — VANCOMYCIN HYDROCHLORIDE 1750 MG: 5 INJECTION, POWDER, LYOPHILIZED, FOR SOLUTION INTRAVENOUS at 11:14

## 2022-08-11 RX ADMIN — DULOXETINE 60 MG: 30 CAPSULE, DELAYED RELEASE ORAL at 09:19

## 2022-08-11 RX ADMIN — INSULIN LISPRO 2 UNITS: 100 INJECTION, SOLUTION INTRAVENOUS; SUBCUTANEOUS at 17:58

## 2022-08-11 RX ADMIN — INSULIN LISPRO 2 UNITS: 100 INJECTION, SOLUTION INTRAVENOUS; SUBCUTANEOUS at 12:21

## 2022-08-11 RX ADMIN — NIFEDIPINE 120 MG: 30 TABLET, FILM COATED, EXTENDED RELEASE ORAL at 09:19

## 2022-08-11 RX ADMIN — FLUTICASONE PROPIONATE 2 SPRAY: 50 SPRAY, METERED NASAL at 09:20

## 2022-08-11 RX ADMIN — ASPIRIN 81 MG: 81 TABLET, COATED ORAL at 09:19

## 2022-08-11 RX ADMIN — HEPARIN SODIUM 5000 UNITS: 5000 INJECTION INTRAVENOUS; SUBCUTANEOUS at 21:00

## 2022-08-11 RX ADMIN — SERTRALINE HYDROCHLORIDE 75 MG: 25 TABLET ORAL at 09:19

## 2022-08-11 RX ADMIN — PRAVASTATIN SODIUM 20 MG: 20 TABLET ORAL at 17:00

## 2022-08-11 RX ADMIN — CEFEPIME HYDROCHLORIDE 2000 MG: 2 INJECTION, SOLUTION INTRAVENOUS at 22:00

## 2022-08-11 RX ADMIN — HEPARIN SODIUM 5000 UNITS: 5000 INJECTION INTRAVENOUS; SUBCUTANEOUS at 14:04

## 2022-08-11 RX ADMIN — MIRTAZAPINE 45 MG: 15 TABLET, FILM COATED ORAL at 22:26

## 2022-08-11 RX ADMIN — KETOROLAC TROMETHAMINE 15 MG: 30 INJECTION, SOLUTION INTRAMUSCULAR; INTRAVENOUS at 22:27

## 2022-08-11 RX ADMIN — HEPARIN SODIUM 5000 UNITS: 5000 INJECTION INTRAVENOUS; SUBCUTANEOUS at 04:40

## 2022-08-11 RX ADMIN — INSULIN GLARGINE 37 UNITS: 100 INJECTION, SOLUTION SUBCUTANEOUS at 22:38

## 2022-08-11 RX ADMIN — LORATADINE 10 MG: 10 TABLET ORAL at 09:19

## 2022-08-11 RX ADMIN — KETOROLAC TROMETHAMINE 15 MG: 30 INJECTION, SOLUTION INTRAMUSCULAR; INTRAVENOUS at 06:18

## 2022-08-11 RX ADMIN — INSULIN GLARGINE 18 UNITS: 100 INJECTION, SOLUTION SUBCUTANEOUS at 09:17

## 2022-08-11 RX ADMIN — HYDROCHLOROTHIAZIDE 25 MG: 25 TABLET ORAL at 09:19

## 2022-08-11 RX ADMIN — LOSARTAN POTASSIUM 100 MG: 50 TABLET, FILM COATED ORAL at 09:19

## 2022-08-11 RX ADMIN — DULOXETINE 60 MG: 30 CAPSULE, DELAYED RELEASE ORAL at 22:00

## 2022-08-11 RX ADMIN — PROPRANOLOL HYDROCHLORIDE 160 MG: 80 CAPSULE, EXTENDED RELEASE ORAL at 09:18

## 2022-08-11 RX ADMIN — INSULIN LISPRO 1 UNITS: 100 INJECTION, SOLUTION INTRAVENOUS; SUBCUTANEOUS at 09:16

## 2022-08-11 NOTE — TELEPHONE ENCOUNTER
----- Message from Jerad Aguayo sent at 8/1/2022 10:59 AM EDT -----  Regarding: Education etc  Shraddha Pantoja,    Can you contact patient to set up in person education with clemente from [place]  I did give the patient on Friday the road map packet  I believe Valerio Garnica faxed over the consent he filled out also        Thank you,  Federica Doty

## 2022-08-11 NOTE — ASSESSMENT & PLAN NOTE
· Presented due to new ulcer on his inner left foot with swelling and erythema  Saw podiatry outpatient on 8/9 who recommended he go to the ER for osteomyelitis workup     · Denies fevers or chills   · Met sepsis criteria due to tachycardia, WBC 15 25   · Lactic and procalcitonin WNL  · CRP 88 6  · Sed rate 83  · Home regimen: Glipizide 10 mg bid, Admelog bid, jardiance and metformin   · Hold oral hypoglycemics   · SSI   · A1c 6 5  · Xray: No radiographic evidence of osteomyelitis  · Continue IV cefepime and vancomycin  · MRI pending  · Consult Podiatry   (P) [phone]

## 2022-08-11 NOTE — PROGRESS NOTES
[facility]  Progress Note - Zeke Pace 1955, 74 y o  male MRN: [id number]  Unit/Bed#: -Fidel Encounter: [id number]  Primary Care Provider: Babar Bill DO   Date and time admitted to hospital: 8/9/2022  6:29 PM    * Diabetic ulcer of left foot associated with type 2 diabetes mellitus ([facility] )  Assessment & Plan  · Presented due to new ulcer on his inner left foot with swelling and erythema  Saw podiatry outpatient on 8/9 who recommended he go to the ER for osteomyelitis workup  · Denies fevers or chills   · Met sepsis criteria due to tachycardia, WBC 15 25   · Lactic and procalcitonin WNL  · CRP 88 6  · Sed rate 83  · Home regimen: Glipizide 10 mg bid, Admelog bid, jardiance and metformin   · Hold oral hypoglycemics   · SSI   · A1c 6 5  · Xray: No radiographic evidence of osteomyelitis  · Continue IV cefepime and vancomycin  · MRI pending  · Consult Podiatry   (P) [phone]            Hypertensive urgency  Assessment & Plan  · Home regimen: losartan-HCTZ 100-25 mg daily, Nifedipine 90 mg daily, propranolol 160 mg daily   · Hypertensive in the /86  · IV Hydralazine PRN   · Continue to monitor     Sepsis ([place] [address] )  Assessment & Plan  · Met sepsis due to tachycardia, WBC 15 25  Source of infection cellulitis of diabetic ulcer on left foot  R/O osteo   · Lactic WNL  · Procal 0 08   · IV cefepime and IV vancomycin  · BC NGTD  · Wound cx pending    Hypercholesterolemia  Assessment & Plan  · Continue statin       VTE Pharmacologic Prophylaxis: VTE Score: 3 Moderate Risk (Score 3-4) - Pharmacological DVT Prophylaxis Ordered: heparin  Patient Centered Rounds: I performed bedside rounds with nursing staff today  Discussions with Specialists or Other Care Team Provider: Podiatry, CM, PT, OT    Education and Discussions with Family / Patient: pt  Time Spent for Care: 30 minutes   More than 50% of total time spent on counseling and coordination of care as described above  Current Length of Stay: 2 day(s)  Current Patient Status: Inpatient   Certification Statement: The patient will continue to require additional inpatient hospital stay due to r/o osteo  Discharge Plan: Anticipate discharge in 24-48 hrs to discharge location to be determined pending rehab evaluations  Code Status: Level 1 - Full Code    Subjective:   Gita Black was seen and examined bedside  No acute events overnight  Patient is very tired today  All other symptoms on review systems is negative  Is complaining of back pain  Objective:     Vitals:   Temp (24hrs), Av 1 °F (36 7 °C), Min:97 7 °F (36 5 °C), Max:98 6 °F (37 °C)    Temp:  [97 7 °F (36 5 °C)-98 6 °F (37 °C)] 97 9 °F (36 6 °C)  HR:  [77-83] 83  Resp:  [16-22] 16  BP: (147-160)/(72-80) 147/80  SpO2:  [92 %-99 %] 98 %  Body mass index is 29 56 kg/m²  Input and Output Summary (last 24 hours):   No intake or output data in the 24 hours ending 22 1230    Physical Exam:   Physical Exam  Vitals and nursing note reviewed  Constitutional:       Appearance: Normal appearance  He is obese  Comments: sleepy   HENT:      Head: Normocephalic and atraumatic  Cardiovascular:      Rate and Rhythm: Normal rate and regular rhythm  Pulses: Normal pulses  Heart sounds: Normal heart sounds  Pulmonary:      Effort: Pulmonary effort is normal       Breath sounds: Normal breath sounds  Abdominal:      General: Abdomen is flat  Bowel sounds are normal       Palpations: Abdomen is soft  Musculoskeletal:      Right lower leg: No edema  Left lower leg: No edema  Skin:     General: Skin is warm  Neurological:      General: No focal deficit present  Mental Status: He is oriented to person, place, and time            Additional Data:     Labs:  Results from last 7 days   Lab Units 22  0448 08/10/22  0456   WBC Thousand/uL 11 30* 12 07*   HEMOGLOBIN g/dL 12 6 12 5   HEMATOCRIT % 40 9 40 4   PLATELETS Thousands/uL 357 364   NEUTROS PCT %  --  74   LYMPHS PCT %  --  15   MONOS PCT %  --  8   EOS PCT %  --  2     Results from last 7 days   Lab Units 08/11/22  0448 08/10/22  0456 08/09/22  1907   SODIUM mmol/L 136   < > 136   POTASSIUM mmol/L 3 7   < > 3 5   CHLORIDE mmol/L 98   < > 93*   CO2 mmol/L 31   < > 33*   BUN mg/dL 5   < > 7   CREATININE mg/dL 0 83   < > 0 91   ANION GAP mmol/L 7   < > 10   CALCIUM mg/dL 9 3   < > 9 5   ALBUMIN g/dL  --   --  3 8   TOTAL BILIRUBIN mg/dL  --   --  0 50   ALK PHOS U/L  --   --  107   ALT U/L  --   --  15   AST U/L  --   --  25   GLUCOSE RANDOM mg/dL 127   < > 100    < > = values in this interval not displayed  Results from last 7 days   Lab Units 08/11/22  1048 08/11/22  0706 08/10/22  2101 08/10/22  2038 08/10/22  1635 08/10/22  1118 08/10/22  0740 08/10/22  0001   POC GLUCOSE mg/dl 228* 176* 115 55* 186* 85 120 100     Results from last 7 days   Lab Units 08/10/22  0456   HEMOGLOBIN A1C % 6 5*     Results from last 7 days   Lab Units 08/11/22  0448 08/10/22  0456 08/09/22  2041 08/09/22  2040   LACTIC ACID mmol/L  --   --   --  1 2   PROCALCITONIN ng/ml 0 07 0 08 0 09  --        Lines/Drains:  Invasive Devices  Report    Peripheral Intravenous Line  Duration           Peripheral IV 08/10/22 Right Antecubital <1 day                      Imaging: No pertinent imaging reviewed  Recent Cultures (last 7 days):   Results from last 7 days   Lab Units 08/09/22 2046 08/09/22  1900   BLOOD CULTURE  No Growth at 24 hrs  No Growth at 24 hrs         Last 24 Hours Medication List:   Current Facility-Administered Medications   Medication Dose Route Frequency Provider Last Rate    acetaminophen  650 mg Oral Q6H PRN Transfer Brittnee, PA-C      aspirin  81 mg Oral Daily Transfer [place], PA-C      cefepime  2,000 mg Intravenous Q12H Linda [place], PA-C 2,000 mg (08/11/22 0920)    clonazePAM  1 mg Oral BID PRN Linda Beaulieu PA-C      cyclobenzaprine  10 mg Oral HS PRN Linda Beaulieu PA-C      DULoxetine  60 mg Oral BID Stacia Carranza PA-C      fluticasone  2 spray Each Nare Daily Stacia Carranza PA-C      folic acid  2 mg Oral Daily Stacia Carranza PA-C      heparin (porcine)  5,000 Units Subcutaneous [facility] Stacia Carranza PA-C      hydrALAZINE  5 mg Intravenous Q6H PRN Stacia Carranza PA-C      hydrochlorothiazide  25 mg Oral Daily Stacia Carranza PA-C      insulin glargine  37 Units Subcutaneous HS Anum Mendosa MD      insulin lispro  1-6 Units Subcutaneous TID AC Sailaja Salcedo PA-C      insulin lispro  1-6 Units Subcutaneous HS Stacia Carranza PA-C      ketorolac  15 mg Intravenous Q6H PRN Stacia Carranza PA-C      lidocaine  1 patch Topical HS Stacia Carranza PA-C      loratadine  10 mg Oral Daily Stacia Carranza PA-C      losartan  100 mg Oral Daily Stacia Carranza PA-C      magnesium sulfate  2 g Intravenous Once Anum Mendosa MD      mirtazapine  45 mg Oral HS Stacia Carranza PA-C      NIFEdipine  120 mg Oral Daily Stacia Carranza PA-C      ondansetron  4 mg Intravenous Q6H PRN Stacia Carranza PA-C      pravastatin  20 mg Oral Daily With DANIEL Azlu      propranolol  160 mg Oral Daily Stacia Carranza PA-C      sertraline  75 mg Oral Daily Stacia Carranza PA-C      vancomycin  17 5 mg/kg Intravenous Q12H Stacia Carranza PA-C 1,750 mg (08/11/22 1114)        Today, Patient Was Seen By: Anum Mendosa MD    **Please Note: This note may have been constructed using a voice recognition system  **

## 2022-08-11 NOTE — PHYSICAL THERAPY NOTE
Physical Therapy Cancellation Note       08/11/22 0921   PT Last Visit   PT Visit Date 08/11/22   Note Type   Note type Cancelled Session   Additional Comments Podiatry recommended MRI to rule out osteomyelitis  Awaiting MRI to be performed and awaiting weight bearing status  Will continue to follow  Josse Page, PT

## 2022-08-11 NOTE — PROGRESS NOTES
Vancomycin IV Pharmacy-to-Dose Consultation    Tye Mei is a 74 y o  male who is currently receiving Vancomycin IV with management by the Pharmacy Consult service  Assessment/Plan:  The patient was reviewed  Renal function is stable and no signs or symptoms of nephrotoxicity and/or infusion reactions were documented in the chart  Patient is currently on vancomycin 1750mg IV Q12H and most recent trough level drawn today prior to 4th dose is 16 9 ug/ml  This is therapeutic level based on goal of 15-20  Based on todays assessment, continue current vancomycin (day # 3) dosing of 1750mg IV Q12H, with a plan for trough to be drawn at 1030 on 8/13/22  We will continue to follow the patients culture results and clinical progress daily      Sangeeta Mendez, Pharmacist

## 2022-08-11 NOTE — TELEPHONE ENCOUNTER
Emailed Tawnya Concepcion and Ld from [place] to please schedule an in person education session w patient in [facility]

## 2022-08-11 NOTE — PROGRESS NOTES
Progress Note - Podiatry  Lita Lane 74 y o  male MRN: [id number]  Unit/Bed#: -01 Encounter: [id number]    Assessment/Plan:  Cellulitis/abscess left foot/sepsis  · Continue with current IV antibiotics  · X-rays show no destructive changes suggesting osteo  · Elevated CRP and ESR 83 suggestive of possible underlying bone infection  · MRI completed this afternoon shows no underlying osteomyelitis  · WBC 15 25 upon admission, 11 30 today     Diabetic Skin ulceration left forefoot with fat layer exposed  · Plan for OR I&D/debridement on 8/13/2020 to early a m , NPO tomorrow at 11:00 p m  · Consent reviewed in detail and signed  All risks benefits alternatives and possible complications reviewed in detail with patient  No promises have been made with regards the anticipated surgical results  · Chronic wound open since the beginning of June with history of opening and closing  · Wound culture +1 g positive cocci in pairs with rare Gram-negative rods  · Continue with alginate dressing every other day      DM2 with neuropathy, Hypertensive urgency, hypercholesterolemia  · Managed per Internal Medicine  · Recommend patient continue with regular diabetic foot care  · Will require new diabetic shoes in the near future        Subjective/Objective   Chief Complaint:   Chief Complaint   Patient presents with    Wound Infection     Pt reports being diabetic and two days ago noticed swelling and redness on a boil on his inner left foot  Denies fevers  Subjective:  79-year-old white male seen resting comfortably in bed  No acute pedal concerns  MRI results reviewed with patient  Surgical plan for his foot reviewed and patient is in agreement  Denies any new pain or discomfort or shortness of breath  Blood pressure 147/80, pulse 83, temperature 97 9 °F (36 6 °C), temperature source Oral, resp  rate 16, height 5' 10" (1 778 m), weight 93 4 kg (206 lb), SpO2 98 %  ,Body mass index is 29 56 kg/m²  Invasive Devices  Report    Peripheral Intravenous Line  Duration           Peripheral IV 08/10/22 Right Antecubital <1 day                Physical Exam:   General appearance: alert, cooperative and no distress  Neuro/Vascular: Normal strength  Sensation and reflexes:  Diminished epicritic sensations with loss of protective sensation consistent with advanced diabetic neuropathy  Pulses: Right: DP 1/4, PT 0/4, Left: DP 1/4, PT 0/4  Capillary Filling:  3 Sec, Edema:  +1 left forefoot  Ulcers/Wounds:  Left foot sub 1st MPJ full-thickness ulcerative breakdown with 3 specific deep areas wounds do not probe deep more than 3 mm that were covered by superficial necrotic slough  2-3 drops of purulent drainage noted from distal medial aspect of wound  Diminished erythema and edema noted  Labs and other studies:   CBC w/diff  Results from last 7 days   Lab Units 08/11/22 0448 08/10/22  0456   WBC Thousand/uL 11 30* 12 07*   HEMOGLOBIN g/dL 12 6 12 5   HEMATOCRIT % 40 9 40 4   PLATELETS Thousands/uL 357 364   NEUTROS PCT %  --  74   LYMPHS PCT %  --  15   MONOS PCT %  --  8   EOS PCT %  --  2     BMP  Results from last 7 days   Lab Units 08/11/22  0448   POTASSIUM mmol/L 3 7   CHLORIDE mmol/L 98   CO2 mmol/L 31   BUN mg/dL 5   CREATININE mg/dL 0 83   CALCIUM mg/dL 9 3     CMP  Results from last 7 days   Lab Units 08/11/22  0448 08/10/22  0456 08/09/22  1907   POTASSIUM mmol/L 3 7   < > 3 5   CHLORIDE mmol/L 98   < > 93*   CO2 mmol/L 31   < > 33*   BUN mg/dL 5   < > 7   CREATININE mg/dL 0 83   < > 0 91   CALCIUM mg/dL 9 3   < > 9 5   ALK PHOS U/L  --   --  107   ALT U/L  --   --  15   AST U/L  --   --  25    < > = values in this interval not displayed         @Culture@  Lab Results   Component Value Date    BLOODCX No Growth at 24 hrs  08/09/2022    BLOODCX No Growth at 24 hrs  08/09/2022     No results found for: WOUNDCULT      Current Facility-Administered Medications:     acetaminophen (TYLENOL) tablet 650 mg, 650 mg, Oral, Q6H PRN, Justin Ramirez PA-C, 650 mg at 08/10/22 1553    aspirin (ECOTRIN LOW STRENGTH) EC tablet 81 mg, 81 mg, Oral, Daily, Sailaja Salcedo PA-C, 81 mg at 08/11/22 0919    baclofen tablet 10 mg, 10 mg, Oral, TID, Stephanie Garzon MD    cefepime (MAXIPIME) IVPB (premix in dextrose) 2,000 mg 50 mL, 2,000 mg, Intravenous, Q12H, Sailaja Salcedo PA-C, Last Rate: 100 mL/hr at 08/11/22 0920, 2,000 mg at 08/11/22 0920    clonazePAM (KlonoPIN) tablet 1 mg, 1 mg, Oral, BID PRN, Justin Ramirez PA-C, 1 mg at 08/10/22 1040    cyclobenzaprine (FLEXERIL) tablet 10 mg, 10 mg, Oral, HS PRN, Justin Ramirez PA-C, 10 mg at 08/10/22 1553    DULoxetine (CYMBALTA) delayed release capsule 60 mg, 60 mg, Oral, BID, Sailaja Salcedo PA-C, 60 mg at 08/11/22 0919    fluticasone (FLONASE) 50 mcg/act nasal spray 2 spray, 2 spray, Each Nare, Daily, Sailaja Salcedo PA-C, 2 spray at 81/08/40 3649    folic acid (FOLVITE) tablet 2 mg, 2 mg, Oral, Daily, Sailaja Salcedo PA-C, 2 mg at 08/11/22 0919    heparin (porcine) subcutaneous injection 5,000 Units, 5,000 Units, Subcutaneous, Q8H [facility], 5,000 Units at 08/11/22 0440 **AND** [CANCELED] Platelet count, , , Once, Justin Ramirez PA-C    hydrALAZINE (APRESOLINE) injection 5 mg, 5 mg, Intravenous, Q6H PRN, Justin Ramirez PA-C    hydrochlorothiazide (HYDRODIURIL) tablet 25 mg, 25 mg, Oral, Daily, Sailaja Salcedo PA-C, 25 mg at 08/11/22 0919    insulin glargine (LANTUS) subcutaneous injection 37 Units 0 37 mL, 37 Units, Subcutaneous, HS, Stephanie Garzon MD    insulin lispro (HumaLOG) 100 units/mL subcutaneous injection 1-6 Units, 1-6 Units, Subcutaneous, TID AC, 2 Units at 08/11/22 1221 **AND** Fingerstick Glucose (POCT), , , TID AC, Sailaja Salcedo PA-C    insulin lispro (HumaLOG) 100 units/mL subcutaneous injection 1-6 Units, 1-6 Units, Subcutaneous, HS, Sailaja Salcedo PA-C    ketorolac (TORADOL) injection 15 mg, 15 mg, Intravenous, Q6H PRN, Sailaja Salcedo PA-C, 15 mg at 08/11/22 1220    lidocaine (LIDODERM) 5 % patch 1 patch, 1 patch, Topical, HS, Sailaja Salcedo PA-C, 1 patch at 08/10/22 0003    loratadine (CLARITIN) tablet 10 mg, 10 mg, Oral, Daily, Sailaja Salcedo PA-C, 10 mg at 08/11/22 0919    losartan (COZAAR) tablet 100 mg, 100 mg, Oral, Daily, Sailaja Salcedo PA-C, 100 mg at 08/11/22 0919    mirtazapine (REMERON) tablet 45 mg, 45 mg, Oral, HS, Sailaja Salcedo PA-C, 45 mg at 08/10/22 2123    NIFEdipine (PROCARDIA XL) 24 hr tablet 120 mg, 120 mg, Oral, Daily, Sailaja Salcedo PA-C, 120 mg at 08/11/22 0919    ondansetron (ZOFRAN) injection 4 mg, 4 mg, Intravenous, Q6H PRN, Armand Collet, PA-C    pravastatin (PRAVACHOL) tablet 20 mg, 20 mg, Oral, Daily With Dinner, Armand Collet, PA-C, 20 mg at 08/10/22 1707    propranolol (INDERAL LA) 24 hr capsule 160 mg, 160 mg, Oral, Daily, Sailaja Salcedo PA-C, 160 mg at 08/11/22 0918    sertraline (ZOLOFT) tablet 75 mg, 75 mg, Oral, Daily, Sailaja Salcedo PA-C, 75 mg at 08/11/22 0919    vancomycin (VANCOCIN) 1,750 mg in sodium chloride 0 9 % 500 mL IVPB, 17 5 mg/kg, Intravenous, Q12H, Sailaja Salcedo PA-C, Last Rate: 250 mL/hr at 08/11/22 1114, 1,750 mg at 08/11/22 1114    Imaging: I have personally reviewed pertinent films in PACS  EKG, Pathology, and Other Studies: I have personally reviewed pertinent reports    VTE Pharmacologic Prophylaxis: Heparin  VTE Mechanical Prophylaxis: sequential compression device    Shabana Connolly DPM

## 2022-08-11 NOTE — ASSESSMENT & PLAN NOTE
· Met sepsis due to tachycardia, WBC 15 25  Source of infection cellulitis of diabetic ulcer on left foot  R/O osteo   · Lactic WNL     · Procal 0 08   · IV cefepime and IV vancomycin  · BC NGTD  · Wound cx pending

## 2022-08-11 NOTE — PLAN OF CARE
Problem: Potential for Falls  Goal: Patient will remain free of falls  Description: INTERVENTIONS:  - Educate patient/family on patient safety including physical limitations  - Instruct patient to call for assistance with activity   - Consult OT/PT to assist with strengthening/mobility   - Keep Call bell within reach  - Keep bed low and locked with side rails adjusted as appropriate  - Keep care items and personal belongings within reach  - Initiate and maintain comfort rounds  - Make Fall Risk Sign visible to staff  - Offer Toileting every 2 Hours, in advance of need  - Initiate/Maintain bed alarm  - Obtain necessary fall risk management equipment:   - Apply yellow socks and bracelet for high fall risk patients  - Consider moving patient to room near nurses station  Outcome: Progressing     Problem: METABOLIC, FLUID AND ELECTROLYTES - ADULT  Goal: Electrolytes maintained within normal limits  Description: INTERVENTIONS:  - Monitor labs and assess patient for signs and symptoms of electrolyte imbalances  - Administer electrolyte replacement as ordered  - Monitor response to electrolyte replacements, including repeat lab results as appropriate  - Instruct patient on fluid and nutrition as appropriate  Outcome: Progressing     Problem: SKIN/TISSUE INTEGRITY - ADULT  Goal: Skin Integrity remains intact(Skin Breakdown Prevention)  Description: Assess:  -Perform Sanjay assessment  -Clean and moisturize skin  -Inspect skin when repositioning, toileting, and assisting with ADLS  -Assess under medical devices  -Assess extremities for adequate circulation and sensation     Bed Management:  -Have minimal linens on bed & keep smooth, unwrinkled  -Change linens as needed when moist or perspiring  -Avoid sitting or lying in one position for more than 2 hours while in bed  -Keep HOB at 30 degrees     Toileting:  -Offer bedside commode  -Assess for incontinence  -Use incontinent care products after each incontinent episode    Activity:  -Mobilize patient 2 times a day  -Encourage activity and walks on unit  -Encourage or provide ROM exercises   -Turn and reposition patient every 2 Hours  -Use appropriate equipment to lift or move patient in bed  -Instruct/ Assist with weight shifting when out of bed in chair  -Consider limitation of chair time 2 hour intervals    Skin Care:  -Avoid use of baby powder, tape, friction and shearing, hot water or constrictive clothing  -Relieve pressure over bony prominences  -Do not massage red bony areas    Next Steps:  -Teach patient strategies to minimize risks   -Consider consults to  interdisciplinary teams   Outcome: Progressing  Goal: Incision(s), wounds(s) or drain site(s) healing without S/S of infection  Description: INTERVENTIONS  - Assess and document dressing, incision, wound bed, drain sites and surrounding tissue  - Provide patient and family education  - Perform skin care/dressing changes  Outcome: Progressing     Problem: HEMATOLOGIC - ADULT  Goal: Maintains hematologic stability  Description: INTERVENTIONS  - Assess for signs and symptoms of bleeding or hemorrhage  - Monitor labs  - Administer supportive blood products/factors as ordered and appropriate  Outcome: Progressing     Problem: MUSCULOSKELETAL - ADULT  Goal: Maintain or return mobility to safest level of function  Description: INTERVENTIONS:  - Assess patient's ability to carry out ADLs; assess patient's baseline for ADL function and identify physical deficits which impact ability to perform ADLs (bathing, care of mouth/teeth, toileting, grooming, dressing, etc )  - Assess/evaluate cause of self-care deficits   - Assess range of motion  - Assess patient's mobility  - Assess patient's need for assistive devices and provide as appropriate  - Encourage maximum independence but intervene and supervise when necessary  - Involve family in performance of ADLs  - Assess for home care needs following discharge   - Consider OT consult to assist with ADL evaluation and planning for discharge  - Provide patient education as appropriate  Outcome: Progressing     Problem: PAIN - ADULT  Goal: Verbalizes/displays adequate comfort level or baseline comfort level  Description: Interventions:  - Encourage patient to monitor pain and request assistance  - Assess pain using appropriate pain scale  - Administer analgesics based on type and severity of pain and evaluate response  - Implement non-pharmacological measures as appropriate and evaluate response  - Consider cultural and social influences on pain and pain management  - Notify physician/advanced practitioner if interventions unsuccessful or patient reports new pain  Outcome: Progressing     Problem: INFECTION - ADULT  Goal: Absence or prevention of progression during hospitalization  Description: INTERVENTIONS:  - Assess and monitor for signs and symptoms of infection  - Monitor lab/diagnostic results  - Monitor all insertion sites, i e  indwelling lines, tubes, and drains  - Monitor endotracheal if appropriate and nasal secretions for changes in amount and color  - [place] appropriate cooling/warming therapies per order  - Administer medications as ordered  - Instruct and encourage patient and family to use good hand hygiene technique  - Identify and instruct in appropriate isolation precautions for identified infection/condition  Outcome: Progressing     Problem: SAFETY ADULT  Goal: Patient will remain free of falls  Description: INTERVENTIONS:  - Educate patient/family on patient safety including physical limitations  - Instruct patient to call for assistance with activity   - Consult OT/PT to assist with strengthening/mobility   - Keep Call bell within reach  - Keep bed low and locked with side rails adjusted as appropriate  - Keep care items and personal belongings within reach  - Initiate and maintain comfort rounds  - Make Fall Risk Sign visible to staff  - Offer Toileting every 2 Hours, in advance of need  - Initiate/Maintain bed alarm  - Obtain necessary fall risk management equipment:   - Apply yellow socks and bracelet for high fall risk patients  - Consider moving patient to room near nurses station  Outcome: Progressing     Problem: DISCHARGE PLANNING  Goal: Discharge to home or other facility with appropriate resources  Description: INTERVENTIONS:  - Identify barriers to discharge w/patient and caregiver  - Arrange for needed discharge resources and transportation as appropriate  - Identify discharge learning needs (meds, wound care, etc )  - Arrange for interpretive services to assist at discharge as needed  - Refer to Case Management Department for coordinating discharge planning if the patient needs post-hospital services based on physician/advanced practitioner order or complex needs related to functional status, cognitive ability, or social support system  Outcome: Progressing     Problem: Knowledge Deficit  Goal: Patient/family/caregiver demonstrates understanding of disease process, treatment plan, medications, and discharge instructions  Description: Complete learning assessment and assess knowledge base  Interventions:  - Provide teaching at level of understanding  - Provide teaching via preferred learning methods  Outcome: Progressing     Problem: Nutrition/Hydration-ADULT  Goal: Nutrient/Hydration intake appropriate for improving, restoring or maintaining nutritional needs  Description: Monitor and assess patient's nutrition/hydration status for malnutrition  Collaborate with interdisciplinary team and initiate plan and interventions as ordered  Monitor patient's weight and dietary intake as ordered or per policy  Utilize nutrition screening tool and intervene as necessary  Determine patient's food preferences and provide high-protein, high-caloric foods as appropriate       INTERVENTIONS:  - Monitor oral intake, urinary output, labs, and treatment plans  - Assess nutrition and hydration status and recommend course of action  - Evaluate amount of meals eaten  - Assist patient with eating if necessary   - Allow adequate time for meals  - Recommend/ encourage appropriate diets, oral nutritional supplements, and vitamin/mineral supplements  - Order, calculate, and assess calorie counts as needed  - Recommend, monitor, and adjust tube feedings and TPN/PPN based on assessed needs  - Assess need for intravenous fluids  - Provide specific nutrition/hydration education as appropriate  - Include patient/family/caregiver in decisions related to nutrition  Outcome: Progressing

## 2022-08-11 NOTE — OCCUPATIONAL THERAPY NOTE
Occupational Therapy Cx Note     Patient Name: Josiane Howard  Today's Date: 8/11/2022  Problem List  Principal Problem:    Diabetic ulcer of left foot associated with type 2 diabetes mellitus ([facility] [address] )  Active Problems:    Hypercholesterolemia    Sepsis ([facility] [address] )    Hypertensive urgency          08/11/22 1137   OT Last Visit   OT Visit Date 08/11/22   Note Type   Note type Cancelled Session   Additional Comments OT orders still pending  Podiatry recommending MRI  May need/require OR debridement  OT to hold             DENNIS Walden/RUBI

## 2022-08-12 ENCOUNTER — ANESTHESIA EVENT (INPATIENT)
Dept: PERIOP | Facility: HOSPITAL | Age: 67
DRG: 854 | End: 2022-08-12
Payer: MEDICARE

## 2022-08-12 ENCOUNTER — ANESTHESIA (INPATIENT)
Dept: PERIOP | Facility: HOSPITAL | Age: 67
DRG: 854 | End: 2022-08-12
Payer: MEDICARE

## 2022-08-12 ENCOUNTER — HOME HEALTH ADMISSION (OUTPATIENT)
Dept: HOME HEALTH SERVICES | Facility: HOME HEALTHCARE | Age: 67
End: 2022-08-12
Payer: MEDICARE

## 2022-08-12 ENCOUNTER — TELEPHONE (OUTPATIENT)
Dept: PAIN MEDICINE | Facility: CLINIC | Age: 67
End: 2022-08-12

## 2022-08-12 LAB
ANION GAP SERPL CALCULATED.3IONS-SCNC: 4 MMOL/L (ref 4–13)
BUN SERPL-MCNC: 8 MG/DL (ref 5–25)
CALCIUM SERPL-MCNC: 9.2 MG/DL (ref 8.3–10.1)
CHLORIDE SERPL-SCNC: 99 MMOL/L (ref 96–108)
CO2 SERPL-SCNC: 31 MMOL/L (ref 21–32)
CREAT SERPL-MCNC: 0.81 MG/DL (ref 0.6–1.3)
ERYTHROCYTE [DISTWIDTH] IN BLOOD BY AUTOMATED COUNT: 14.7 % (ref 11.6–15.1)
GFR SERPL CREATININE-BSD FRML MDRD: 93 ML/MIN/1.73SQ M
GLUCOSE SERPL-MCNC: 121 MG/DL (ref 65–140)
GLUCOSE SERPL-MCNC: 153 MG/DL (ref 65–140)
GLUCOSE SERPL-MCNC: 154 MG/DL (ref 65–140)
GLUCOSE SERPL-MCNC: 161 MG/DL (ref 65–140)
GLUCOSE SERPL-MCNC: 339 MG/DL (ref 65–140)
GLUCOSE SERPL-MCNC: 66 MG/DL (ref 65–140)
HCT VFR BLD AUTO: 39.1 % (ref 36.5–49.3)
HGB BLD-MCNC: 12.4 G/DL (ref 12–17)
MCH RBC QN AUTO: 27.7 PG (ref 26.8–34.3)
MCHC RBC AUTO-ENTMCNC: 31.7 G/DL (ref 31.4–37.4)
MCV RBC AUTO: 87 FL (ref 82–98)
PLATELET # BLD AUTO: 346 THOUSANDS/UL (ref 149–390)
PMV BLD AUTO: 9.6 FL (ref 8.9–12.7)
POTASSIUM SERPL-SCNC: 4.1 MMOL/L (ref 3.5–5.3)
RBC # BLD AUTO: 4.48 MILLION/UL (ref 3.88–5.62)
SODIUM SERPL-SCNC: 134 MMOL/L (ref 135–147)
WBC # BLD AUTO: 10.08 THOUSAND/UL (ref 4.31–10.16)

## 2022-08-12 PROCEDURE — 97163 PT EVAL HIGH COMPLEX 45 MIN: CPT

## 2022-08-12 PROCEDURE — 99233 SBSQ HOSP IP/OBS HIGH 50: CPT | Performed by: STUDENT IN AN ORGANIZED HEALTH CARE EDUCATION/TRAINING PROGRAM

## 2022-08-12 PROCEDURE — 87186 SC STD MICRODIL/AGAR DIL: CPT | Performed by: PODIATRIST

## 2022-08-12 PROCEDURE — 87075 CULTR BACTERIA EXCEPT BLOOD: CPT | Performed by: PODIATRIST

## 2022-08-12 PROCEDURE — 97116 GAIT TRAINING THERAPY: CPT

## 2022-08-12 PROCEDURE — 80048 BASIC METABOLIC PNL TOTAL CA: CPT | Performed by: STUDENT IN AN ORGANIZED HEALTH CARE EDUCATION/TRAINING PROGRAM

## 2022-08-12 PROCEDURE — 87070 CULTURE OTHR SPECIMN AEROBIC: CPT | Performed by: PODIATRIST

## 2022-08-12 PROCEDURE — 87077 CULTURE AEROBIC IDENTIFY: CPT | Performed by: PODIATRIST

## 2022-08-12 PROCEDURE — 87205 SMEAR GRAM STAIN: CPT | Performed by: PODIATRIST

## 2022-08-12 PROCEDURE — 85027 COMPLETE CBC AUTOMATED: CPT | Performed by: STUDENT IN AN ORGANIZED HEALTH CARE EDUCATION/TRAINING PROGRAM

## 2022-08-12 PROCEDURE — 97166 OT EVAL MOD COMPLEX 45 MIN: CPT

## 2022-08-12 PROCEDURE — 82948 REAGENT STRIP/BLOOD GLUCOSE: CPT

## 2022-08-12 PROCEDURE — 0JBR0ZZ EXCISION OF LEFT FOOT SUBCUTANEOUS TISSUE AND FASCIA, OPEN APPROACH: ICD-10-PCS | Performed by: PODIATRIST

## 2022-08-12 RX ORDER — DEXTROSE MONOHYDRATE 25 G/50ML
INJECTION, SOLUTION INTRAVENOUS AS NEEDED
Status: DISCONTINUED | OUTPATIENT
Start: 2022-08-12 | End: 2022-08-12

## 2022-08-12 RX ORDER — FENTANYL CITRATE 50 UG/ML
INJECTION, SOLUTION INTRAMUSCULAR; INTRAVENOUS AS NEEDED
Status: DISCONTINUED | OUTPATIENT
Start: 2022-08-12 | End: 2022-08-12

## 2022-08-12 RX ORDER — INSULIN GLARGINE 100 [IU]/ML
19 INJECTION, SOLUTION SUBCUTANEOUS
Status: DISCONTINUED | OUTPATIENT
Start: 2022-08-12 | End: 2022-08-14

## 2022-08-12 RX ORDER — PROPOFOL 10 MG/ML
INJECTION, EMULSION INTRAVENOUS CONTINUOUS PRN
Status: DISCONTINUED | OUTPATIENT
Start: 2022-08-12 | End: 2022-08-12

## 2022-08-12 RX ORDER — TRAMADOL HYDROCHLORIDE 50 MG/1
50 TABLET ORAL EVERY 6 HOURS PRN
Status: DISCONTINUED | OUTPATIENT
Start: 2022-08-12 | End: 2022-08-15 | Stop reason: HOSPADM

## 2022-08-12 RX ORDER — SODIUM CHLORIDE, SODIUM LACTATE, POTASSIUM CHLORIDE, CALCIUM CHLORIDE 600; 310; 30; 20 MG/100ML; MG/100ML; MG/100ML; MG/100ML
10 INJECTION, SOLUTION INTRAVENOUS CONTINUOUS
Status: DISCONTINUED | OUTPATIENT
Start: 2022-08-12 | End: 2022-08-14

## 2022-08-12 RX ORDER — SODIUM CHLORIDE, SODIUM LACTATE, POTASSIUM CHLORIDE, CALCIUM CHLORIDE 600; 310; 30; 20 MG/100ML; MG/100ML; MG/100ML; MG/100ML
INJECTION, SOLUTION INTRAVENOUS CONTINUOUS PRN
Status: DISCONTINUED | OUTPATIENT
Start: 2022-08-12 | End: 2022-08-12

## 2022-08-12 RX ORDER — FENTANYL CITRATE/PF 50 MCG/ML
25 SYRINGE (ML) INJECTION
Status: DISCONTINUED | OUTPATIENT
Start: 2022-08-12 | End: 2022-08-13

## 2022-08-12 RX ORDER — ONDANSETRON 2 MG/ML
4 INJECTION INTRAMUSCULAR; INTRAVENOUS ONCE AS NEEDED
Status: DISCONTINUED | OUTPATIENT
Start: 2022-08-12 | End: 2022-08-15 | Stop reason: HOSPADM

## 2022-08-12 RX ADMIN — HEPARIN SODIUM 5000 UNITS: 5000 INJECTION INTRAVENOUS; SUBCUTANEOUS at 06:13

## 2022-08-12 RX ADMIN — VANCOMYCIN HYDROCHLORIDE 1750 MG: 5 INJECTION, POWDER, LYOPHILIZED, FOR SOLUTION INTRAVENOUS at 01:11

## 2022-08-12 RX ADMIN — DULOXETINE 60 MG: 30 CAPSULE, DELAYED RELEASE ORAL at 09:24

## 2022-08-12 RX ADMIN — TRAMADOL HYDROCHLORIDE 50 MG: 50 TABLET, COATED ORAL at 22:51

## 2022-08-12 RX ADMIN — BACLOFEN 10 MG: 10 TABLET ORAL at 09:25

## 2022-08-12 RX ADMIN — ASPIRIN 81 MG: 81 TABLET, COATED ORAL at 09:25

## 2022-08-12 RX ADMIN — BACLOFEN 10 MG: 10 TABLET ORAL at 22:49

## 2022-08-12 RX ADMIN — INSULIN LISPRO 1 UNITS: 100 INJECTION, SOLUTION INTRAVENOUS; SUBCUTANEOUS at 09:24

## 2022-08-12 RX ADMIN — TRAMADOL HYDROCHLORIDE 50 MG: 50 TABLET, COATED ORAL at 13:47

## 2022-08-12 RX ADMIN — FLUTICASONE PROPIONATE 2 SPRAY: 50 SPRAY, METERED NASAL at 09:23

## 2022-08-12 RX ADMIN — INSULIN GLARGINE 19 UNITS: 100 INJECTION, SOLUTION SUBCUTANEOUS at 22:54

## 2022-08-12 RX ADMIN — HEPARIN SODIUM 5000 UNITS: 5000 INJECTION INTRAVENOUS; SUBCUTANEOUS at 22:54

## 2022-08-12 RX ADMIN — INSULIN LISPRO 5 UNITS: 100 INJECTION, SOLUTION INTRAVENOUS; SUBCUTANEOUS at 12:21

## 2022-08-12 RX ADMIN — FENTANYL CITRATE 25 MCG: 50 INJECTION, SOLUTION INTRAMUSCULAR; INTRAVENOUS at 17:50

## 2022-08-12 RX ADMIN — BACLOFEN 10 MG: 10 TABLET ORAL at 19:26

## 2022-08-12 RX ADMIN — VANCOMYCIN HYDROCHLORIDE 1750 MG: 5 INJECTION, POWDER, LYOPHILIZED, FOR SOLUTION INTRAVENOUS at 23:49

## 2022-08-12 RX ADMIN — SERTRALINE HYDROCHLORIDE 75 MG: 25 TABLET ORAL at 09:25

## 2022-08-12 RX ADMIN — DEXTROSE MONOHYDRATE 6 G: 500 INJECTION PARENTERAL at 17:39

## 2022-08-12 RX ADMIN — CEFEPIME HYDROCHLORIDE 2000 MG: 2 INJECTION, SOLUTION INTRAVENOUS at 09:24

## 2022-08-12 RX ADMIN — INSULIN LISPRO 1 UNITS: 100 INJECTION, SOLUTION INTRAVENOUS; SUBCUTANEOUS at 22:54

## 2022-08-12 RX ADMIN — PROPOFOL 80 MCG/KG/MIN: 10 INJECTION, EMULSION INTRAVENOUS at 17:57

## 2022-08-12 RX ADMIN — NIFEDIPINE 120 MG: 30 TABLET, FILM COATED, EXTENDED RELEASE ORAL at 09:24

## 2022-08-12 RX ADMIN — ACETAMINOPHEN 650 MG: 325 TABLET ORAL at 04:04

## 2022-08-12 RX ADMIN — PROPOFOL 60 MCG/KG/MIN: 10 INJECTION, EMULSION INTRAVENOUS at 17:36

## 2022-08-12 RX ADMIN — DULOXETINE 60 MG: 30 CAPSULE, DELAYED RELEASE ORAL at 22:48

## 2022-08-12 RX ADMIN — CYCLOBENZAPRINE HYDROCHLORIDE 10 MG: 10 TABLET, FILM COATED ORAL at 02:34

## 2022-08-12 RX ADMIN — LORATADINE 10 MG: 10 TABLET ORAL at 09:25

## 2022-08-12 RX ADMIN — PRAVASTATIN SODIUM 20 MG: 20 TABLET ORAL at 19:26

## 2022-08-12 RX ADMIN — LIDOCAINE 5% 1 PATCH: 700 PATCH TOPICAL at 22:52

## 2022-08-12 RX ADMIN — SODIUM CHLORIDE, SODIUM LACTATE, POTASSIUM CHLORIDE, AND CALCIUM CHLORIDE 10 ML/HR: .6; .31; .03; .02 INJECTION, SOLUTION INTRAVENOUS at 19:58

## 2022-08-12 RX ADMIN — VANCOMYCIN HYDROCHLORIDE 1750 MG: 5 INJECTION, POWDER, LYOPHILIZED, FOR SOLUTION INTRAVENOUS at 12:22

## 2022-08-12 RX ADMIN — CEFEPIME HYDROCHLORIDE 2000 MG: 2 INJECTION, SOLUTION INTRAVENOUS at 22:57

## 2022-08-12 RX ADMIN — SODIUM CHLORIDE, SODIUM LACTATE, POTASSIUM CHLORIDE, AND CALCIUM CHLORIDE: .6; .31; .03; .02 INJECTION, SOLUTION INTRAVENOUS at 17:20

## 2022-08-12 RX ADMIN — FOLIC ACID 2 MG: 1 TABLET ORAL at 09:24

## 2022-08-12 RX ADMIN — LOSARTAN POTASSIUM 100 MG: 50 TABLET, FILM COATED ORAL at 09:24

## 2022-08-12 RX ADMIN — FENTANYL CITRATE 25 MCG: 50 INJECTION, SOLUTION INTRAMUSCULAR; INTRAVENOUS at 17:36

## 2022-08-12 RX ADMIN — PROPRANOLOL HYDROCHLORIDE 160 MG: 80 CAPSULE, EXTENDED RELEASE ORAL at 09:24

## 2022-08-12 RX ADMIN — MIRTAZAPINE 45 MG: 15 TABLET, FILM COATED ORAL at 22:47

## 2022-08-12 RX ADMIN — HYDROCHLOROTHIAZIDE 25 MG: 25 TABLET ORAL at 09:24

## 2022-08-12 NOTE — ANESTHESIA POSTPROCEDURE EVALUATION
Post-Op Assessment Note    CV Status:  Stable  Pain Score: 0    Pain management: adequate     Mental Status:  Alert and awake   Hydration Status:  Euvolemic   PONV Controlled:  Controlled   Airway Patency:  Patent      Post Op Vitals Reviewed: Yes      Staff: CRNA         No complications documented      BP   162/77   Temp   97 8   Pulse  65   Resp   17   SpO2   100% 6LO2 mask

## 2022-08-12 NOTE — PLAN OF CARE
Problem: PAIN - ADULT  Goal: Verbalizes/displays adequate comfort level or baseline comfort level  Description: Interventions:  - Encourage patient to monitor pain and request assistance  - Assess pain using appropriate pain scale  - Administer analgesics based on type and severity of pain and evaluate response  - Implement non-pharmacological measures as appropriate and evaluate response  - Consider cultural and social influences on pain and pain management  - Notify physician/advanced practitioner if interventions unsuccessful or patient reports new pain  Outcome: Progressing     Problem: INFECTION - ADULT  Goal: Absence or prevention of progression during hospitalization  Description: INTERVENTIONS:  - Assess and monitor for signs and symptoms of infection  - Monitor lab/diagnostic results  - Monitor all insertion sites, i e  indwelling lines, tubes, and drains  - Monitor endotracheal if appropriate and nasal secretions for changes in amount and color  - [place] appropriate cooling/warming therapies per order  - Administer medications as ordered  - Instruct and encourage patient and family to use good hand hygiene technique  - Identify and instruct in appropriate isolation precautions for identified infection/condition  Outcome: Progressing     Problem: Knowledge Deficit  Goal: Patient/family/caregiver demonstrates understanding of disease process, treatment plan, medications, and discharge instructions  Description: Complete learning assessment and assess knowledge base    Interventions:  - Provide teaching at level of understanding  - Provide teaching via preferred learning methods  Outcome: Progressing     Problem: DISCHARGE PLANNING  Goal: Discharge to home or other facility with appropriate resources  Description: INTERVENTIONS:  - Identify barriers to discharge w/patient and caregiver  - Arrange for needed discharge resources and transportation as appropriate  - Identify discharge learning needs (meds, wound care, etc )  - Arrange for interpretive services to assist at discharge as needed  - Refer to Case Management Department for coordinating discharge planning if the patient needs post-hospital services based on physician/advanced practitioner order or complex needs related to functional status, cognitive ability, or social support system  Outcome: Progressing

## 2022-08-12 NOTE — PROGRESS NOTES
[place]  Progress Note - Butch Cassette 1955, 74 y o  male MRN: [id number]  Unit/Bed#: -Fidel Encounter: [id number]  Primary Care Provider: Sallie Snellen, DO   Date and time admitted to hospital: 8/9/2022  6:29 PM    * Diabetic ulcer of left foot associated with type 2 diabetes mellitus ([facility] )  Assessment & Plan  · Presented due to new ulcer on his inner left foot with swelling and erythema  Saw podiatry outpatient on 8/9 who recommended he go to the ER for osteomyelitis workup  · Denies fevers or chills   · Met sepsis criteria due to tachycardia, WBC 15 25   · Lactic and procalcitonin WNL  · CRP 88 6  · Sed rate 83  · Home regimen: Glipizide 10 mg bid, Admelog bid, jardiance and metformin   · Hold oral hypoglycemics   · SSI   · A1c 6 5  · Xray: No radiographic evidence of osteomyelitis  · Continue IV cefepime and vancomycin  · MRI:Plantar skin ulceration 1st MTP with subcutaneous edema indicating cellulitis without evidence of drainable abscess collection in this unenhanced study  No evidence of T1 replacement signal to indicate osteomyelitis at this time  · Consult Podiatry   · NPO at midnight, 1/2 lantus  · I&D tomorrow    Hypertensive urgency  Assessment & Plan  · Home regimen: losartan-HCTZ 100-25 mg daily, Nifedipine 90 mg daily, propranolol 160 mg daily   · Hypertensive in the /86  · IV Hydralazine PRN   · Continue to monitor     Sepsis ([facility] )  Assessment & Plan  · Met sepsis due to tachycardia, WBC 15 25  Source of infection cellulitis of diabetic ulcer on left foot  R/O osteo   · Lactic WNL  · Procal 0 08   · IV cefepime and IV vancomycin  · BC NGTD  · Wound cx growing +1 g positive cocci in pairs with rare Gram-negative rods      Hypercholesterolemia  Assessment & Plan  · Continue statin       VTE Pharmacologic Prophylaxis: VTE Score: 3 Moderate Risk (Score 3-4) - Pharmacological DVT Prophylaxis Ordered: heparin  Patient Centered Rounds:  I performed bedside rounds with nursing staff today  Discussions with Specialists or Other Care Team Provider: Podiatry, CM, PT, OT    Education and Discussions with Family / Patient: pt  Time Spent for Care: 30 minutes  More than 50% of total time spent on counseling and coordination of care as described above  Current Length of Stay: 3 day(s)  Current Patient Status: Inpatient   Certification Statement: The patient will continue to require additional inpatient hospital stay due to DM foot ulcer  Discharge Plan: Anticipate discharge in 24-48 hrs to discharge location to be determined pending rehab evaluations  Code Status: Level 1 - Full Code    Subjective:   Peggy Darby was seen and examined at bedside  No acute events overnight  Discussed plan of care  All questions and concerns were answered and addressed  No acute symptoms on review systems  Objective:     Vitals:   Temp (24hrs), Av 9 °F (36 6 °C), Min:97 5 °F (36 4 °C), Max:98 1 °F (36 7 °C)    Temp:  [97 5 °F (36 4 °C)-98 1 °F (36 7 °C)] 97 5 °F (36 4 °C)  HR:  [69-76] 70  Resp:  [16-21] 21  BP: (140-157)/(70-80) 151/80  SpO2:  [96 %-97 %] 96 %  Body mass index is 29 56 kg/m²  Input and Output Summary (last 24 hours): Intake/Output Summary (Last 24 hours) at 2022 1035  Last data filed at 2022 8017  Gross per 24 hour   Intake 1560 ml   Output --   Net 1560 ml       Physical Exam:   Physical Exam  Vitals and nursing note reviewed  Constitutional:       Appearance: Normal appearance  He is obese  HENT:      Head: Normocephalic and atraumatic  Cardiovascular:      Rate and Rhythm: Normal rate and regular rhythm  Pulses: Normal pulses  Heart sounds: Normal heart sounds  Pulmonary:      Effort: Pulmonary effort is normal       Breath sounds: Normal breath sounds  Abdominal:      General: Abdomen is flat  Bowel sounds are normal       Palpations: Abdomen is soft  Musculoskeletal:      Right lower leg: No edema  Left lower leg: No edema  Skin:     General: Skin is warm  Findings: Lesion present  Neurological:      General: No focal deficit present  Mental Status: He is alert and oriented to person, place, and time  Additional Data:     Labs:  Results from last 7 days   Lab Units 08/12/22  0618 08/11/22 0448 08/10/22  0456   WBC Thousand/uL 10 08   < > 12 07*   HEMOGLOBIN g/dL 12 4   < > 12 5   HEMATOCRIT % 39 1   < > 40 4   PLATELETS Thousands/uL 346   < > 364   NEUTROS PCT %  --   --  74   LYMPHS PCT %  --   --  15   MONOS PCT %  --   --  8   EOS PCT %  --   --  2    < > = values in this interval not displayed  Results from last 7 days   Lab Units 08/12/22  0618 08/10/22  0456 08/09/22  1907   SODIUM mmol/L 134*   < > 136   POTASSIUM mmol/L 4 1   < > 3 5   CHLORIDE mmol/L 99   < > 93*   CO2 mmol/L 31   < > 33*   BUN mg/dL 8   < > 7   CREATININE mg/dL 0 81   < > 0 91   ANION GAP mmol/L 4   < > 10   CALCIUM mg/dL 9 2   < > 9 5   ALBUMIN g/dL  --   --  3 8   TOTAL BILIRUBIN mg/dL  --   --  0 50   ALK PHOS U/L  --   --  107   ALT U/L  --   --  15   AST U/L  --   --  25   GLUCOSE RANDOM mg/dL 161*   < > 100    < > = values in this interval not displayed           Results from last 7 days   Lab Units 08/12/22  0726 08/11/22  2225 08/11/22  1701 08/11/22  1048 08/11/22  0706 08/10/22  2101 08/10/22  2038 08/10/22  1635 08/10/22  1118 08/10/22  0740 08/10/22  0001   POC GLUCOSE mg/dl 153* 126 194* 228* 176* 115 55* 186* 85 120 100     Results from last 7 days   Lab Units 08/10/22  0456   HEMOGLOBIN A1C % 6 5*     Results from last 7 days   Lab Units 08/11/22  0448 08/10/22  0456 08/09/22  2041 08/09/22  2040   LACTIC ACID mmol/L  --   --   --  1 2   PROCALCITONIN ng/ml 0 07 0 08 0 09  --        Lines/Drains:  Invasive Devices  Report    Peripheral Intravenous Line  Duration           Peripheral IV 08/10/22 Right Antecubital 1 day                      Imaging: Reviewed radiology reports from this admission including: MRI foot    Recent Cultures (last 7 days):   Results from last 7 days   Lab Units 08/10/22  1818 08/09/22 2046 08/09/22  1900   BLOOD CULTURE   --  No Growth at 48 hrs  No Growth at 48 hrs     GRAM STAIN RESULT  4+ Polys*  1+ Gram positive cocci in pairs*  Rare Gram negative rods*  --   --        Last 24 Hours Medication List:   Current Facility-Administered Medications   Medication Dose Route Frequency Provider Last Rate    acetaminophen  650 mg Oral Q6H PRN Justin Apa, PA-DEANNA      aspirin  81 mg Oral Daily Justin Apa, DANIEL      baclofen  10 mg Oral TID Stephanie Garzon MD      cefepime  2,000 mg Intravenous Q12H Justin Apa, PA-C 2,000 mg (08/12/22 0924)    clonazePAM  1 mg Oral BID PRN Justin Apa, DANIEL      cyclobenzaprine  10 mg Oral HS PRN Justin Apa, DANIEL      DULoxetine  60 mg Oral BID Justin Apa, DANIEL      fluticasone  2 spray Each Nare Daily Justin Apa, DANIEL      folic acid  2 mg Oral Daily Justin Apa, DANIEL      heparin (porcine)  5,000 Units Subcutaneous [facility] Justin Apa, DANIEL      hydrALAZINE  5 mg Intravenous Q6H PRN Justin Apa, DANIEL      hydrochlorothiazide  25 mg Oral Daily Justin Apa, DANIEL      insulin glargine  19 Units Subcutaneous HS Stephanie Garzon MD      insulin lispro  1-6 Units Subcutaneous TID AC Sailaja Salcedo PA-C      insulin lispro  1-6 Units Subcutaneous HS Justin Apa, DANIEL      lidocaine  1 patch Topical HS Justin Apa, DANIEL      loratadine  10 mg Oral Daily Justin Apa, DANIEL      losartan  100 mg Oral Daily Justin Apa, DANIEL      mirtazapine  45 mg Oral HS Sailaja Salcedo PA-C      [START ON 8/13/2022] neomycin-polymyxin B (NEOSPORIN ) irrigation solution   Irrigation Once Salbador Lagos DPM      NIFEdipine  120 mg Oral Daily Justin ApaDANIEL      ondansetron  4 mg Intravenous Q6H PRN Justin Apa, DANIEL      pravastatin  20 mg Oral Daily With DANIEL Azul      propranolol  160 mg Oral Daily Justin Apa, DANIEL      sertraline  75 mg Oral Daily Jama Art PA-C      vancomycin  17 5 mg/kg Intravenous Q12H Jama Art PA-C Stopped (08/12/22 0320)        Today, Patient Was Seen By: Mac Perea MD    **Please Note: This note may have been constructed using a voice recognition system  **

## 2022-08-12 NOTE — TELEPHONE ENCOUNTER
Can you please call the patient and advise him that his bilateral L3 through L5 diagnostic medial branch block diary was negative and at this point it does not appear he would be a candidate to proceed with the confirmatory block or possible radiofrequency ablation procedures  Please let the patient know that at this point I feel it is in his best interest to proceed with the [employer] spinal cord stimulator prior authorization process as per did his conversation with Dr Kodi Clarke at his consultation  Thank you

## 2022-08-12 NOTE — PLAN OF CARE
Problem: Potential for Falls  Goal: Patient will remain free of falls  Description: INTERVENTIONS:  - Educate patient/family on patient safety including physical limitations  - Instruct patient to call for assistance with activity   - Consult OT/PT to assist with strengthening/mobility   - Keep Call bell within reach  - Keep bed low and locked with side rails adjusted as appropriate  - Keep care items and personal belongings within reach  - Initiate and maintain comfort rounds  - Make Fall Risk Sign visible to staff  - Offer Toileting every 2 Hours, in advance of need  - Initiate/Maintain bed alarm  - Obtain necessary fall risk management equipment:   - Apply yellow socks and bracelet for high fall risk patients  - Consider moving patient to room near nurses station  Outcome: Progressing     Problem: METABOLIC, FLUID AND ELECTROLYTES - ADULT  Goal: Electrolytes maintained within normal limits  Description: INTERVENTIONS:  - Monitor labs and assess patient for signs and symptoms of electrolyte imbalances  - Administer electrolyte replacement as ordered  - Monitor response to electrolyte replacements, including repeat lab results as appropriate  - Instruct patient on fluid and nutrition as appropriate  Outcome: Progressing     Problem: SKIN/TISSUE INTEGRITY - ADULT  Goal: Skin Integrity remains intact(Skin Breakdown Prevention)  Description: Assess:  -Perform Sanjay assessment  -Clean and moisturize skin  -Inspect skin when repositioning, toileting, and assisting with ADLS  -Assess under medical devices  -Assess extremities for adequate circulation and sensation     Bed Management:  -Have minimal linens on bed & keep smooth, unwrinkled  -Change linens as needed when moist or perspiring  -Avoid sitting or lying in one position for more than 2 hours while in bed  -Keep HOB at 30 degrees     Toileting:  -Offer bedside commode  -Assess for incontinence  -Use incontinent care products after each incontinent episode    Activity:  -Mobilize patient 2 times a day  -Encourage activity and walks on unit  -Encourage or provide ROM exercises   -Turn and reposition patient every 2 Hours  -Use appropriate equipment to lift or move patient in bed  -Instruct/ Assist with weight shifting when out of bed in chair  -Consider limitation of chair time 2 hour intervals    Skin Care:  -Avoid use of baby powder, tape, friction and shearing, hot water or constrictive clothing  -Relieve pressure over bony prominences  -Do not massage red bony areas    Next Steps:  -Teach patient strategies to minimize risks   -Consider consults to  interdisciplinary teams   Outcome: Progressing  Goal: Incision(s), wounds(s) or drain site(s) healing without S/S of infection  Description: INTERVENTIONS  - Assess and document dressing, incision, wound bed, drain sites and surrounding tissue  - Provide patient and family education  - Perform skin care/dressing changes  Outcome: Progressing     Problem: HEMATOLOGIC - ADULT  Goal: Maintains hematologic stability  Description: INTERVENTIONS  - Assess for signs and symptoms of bleeding or hemorrhage  - Monitor labs  - Administer supportive blood products/factors as ordered and appropriate  Outcome: Progressing     Problem: MUSCULOSKELETAL - ADULT  Goal: Maintain or return mobility to safest level of function  Description: INTERVENTIONS:  - Assess patient's ability to carry out ADLs; assess patient's baseline for ADL function and identify physical deficits which impact ability to perform ADLs (bathing, care of mouth/teeth, toileting, grooming, dressing, etc )  - Assess/evaluate cause of self-care deficits   - Assess range of motion  - Assess patient's mobility  - Assess patient's need for assistive devices and provide as appropriate  - Encourage maximum independence but intervene and supervise when necessary  - Involve family in performance of ADLs  - Assess for home care needs following discharge   - Consider OT consult to assist with ADL evaluation and planning for discharge  - Provide patient education as appropriate  Outcome: Progressing     Problem: PAIN - ADULT  Goal: Verbalizes/displays adequate comfort level or baseline comfort level  Description: Interventions:  - Encourage patient to monitor pain and request assistance  - Assess pain using appropriate pain scale  - Administer analgesics based on type and severity of pain and evaluate response  - Implement non-pharmacological measures as appropriate and evaluate response  - Consider cultural and social influences on pain and pain management  - Notify physician/advanced practitioner if interventions unsuccessful or patient reports new pain  Outcome: Progressing     Problem: INFECTION - ADULT  Goal: Absence or prevention of progression during hospitalization  Description: INTERVENTIONS:  - Assess and monitor for signs and symptoms of infection  - Monitor lab/diagnostic results  - Monitor all insertion sites, i e  indwelling lines, tubes, and drains  - Monitor endotracheal if appropriate and nasal secretions for changes in amount and color  - [place] appropriate cooling/warming therapies per order  - Administer medications as ordered  - Instruct and encourage patient and family to use good hand hygiene technique  - Identify and instruct in appropriate isolation precautions for identified infection/condition  Outcome: Progressing     Problem: SAFETY ADULT  Goal: Patient will remain free of falls  Description: INTERVENTIONS:  - Educate patient/family on patient safety including physical limitations  - Instruct patient to call for assistance with activity   - Consult OT/PT to assist with strengthening/mobility   - Keep Call bell within reach  - Keep bed low and locked with side rails adjusted as appropriate  - Keep care items and personal belongings within reach  - Initiate and maintain comfort rounds  - Make Fall Risk Sign visible to staff  - Offer Toileting every 2 Hours, in advance of need  - Initiate/Maintain bed alarm  - Obtain necessary fall risk management equipment:   - Apply yellow socks and bracelet for high fall risk patients  - Consider moving patient to room near nurses station  Outcome: Progressing     Problem: DISCHARGE PLANNING  Goal: Discharge to home or other facility with appropriate resources  Description: INTERVENTIONS:  - Identify barriers to discharge w/patient and caregiver  - Arrange for needed discharge resources and transportation as appropriate  - Identify discharge learning needs (meds, wound care, etc )  - Arrange for interpretive services to assist at discharge as needed  - Refer to Case Management Department for coordinating discharge planning if the patient needs post-hospital services based on physician/advanced practitioner order or complex needs related to functional status, cognitive ability, or social support system  Outcome: Progressing     Problem: Knowledge Deficit  Goal: Patient/family/caregiver demonstrates understanding of disease process, treatment plan, medications, and discharge instructions  Description: Complete learning assessment and assess knowledge base  Interventions:  - Provide teaching at level of understanding  - Provide teaching via preferred learning methods  Outcome: Progressing     Problem: Nutrition/Hydration-ADULT  Goal: Nutrient/Hydration intake appropriate for improving, restoring or maintaining nutritional needs  Description: Monitor and assess patient's nutrition/hydration status for malnutrition  Collaborate with interdisciplinary team and initiate plan and interventions as ordered  Monitor patient's weight and dietary intake as ordered or per policy  Utilize nutrition screening tool and intervene as necessary  Determine patient's food preferences and provide high-protein, high-caloric foods as appropriate       INTERVENTIONS:  - Monitor oral intake, urinary output, labs, and treatment plans  - Assess nutrition and hydration status and recommend course of action  - Evaluate amount of meals eaten  - Assist patient with eating if necessary   - Allow adequate time for meals  - Recommend/ encourage appropriate diets, oral nutritional supplements, and vitamin/mineral supplements  - Order, calculate, and assess calorie counts as needed  - Recommend, monitor, and adjust tube feedings and TPN/PPN based on assessed needs  - Assess need for intravenous fluids  - Provide specific nutrition/hydration education as appropriate  - Include patient/family/caregiver in decisions related to nutrition  Outcome: Progressing

## 2022-08-12 NOTE — PHYSICAL THERAPY NOTE
PHYSICAL THERAPY EVAL/TX  Physical Therapy Evaluation    Performed at least 2 patient identifiers during session:  Patient Active Problem List   Diagnosis    Alcoholism in remission ([place] [address] )    Benzodiazepine dependence ([place] [address] )    Bilateral adhesive capsulitis of shoulders    Bronchitis, mucopurulent recurrent (HCC)    Cirrhosis, alcoholic ([facility] )    Diabetic peripheral neuropathy (HCC)    DM (diabetes mellitus) ([place] [address] )    Herniated nucleus pulposus of lumbosacral region    Hypercholesterolemia    Lumbar spondylosis    Thyroid cancer (HCC)    Diabetic ulcer of left foot associated with type 2 diabetes mellitus ([facility]  )    Sepsis ([facility]  )    Hypertensive urgency    Liver disease       Past Medical History:   Diagnosis Date    Anxiety     Arthritis     Cancer ([place] [address] )     Depression     Diabetes mellitus ([place] [address] )     Hypertension     Liver disease     Mitral valve prolapse     Thyroid disease        Past Surgical History:   Procedure Laterality Date    JOINT REPLACEMENT Left 03/11/2022    Left TSA    THYROID SURGERY  2021    remove cancer            08/12/22 0955   PT Last Visit   PT Visit Date 08/12/22   Note Type   Note type Evaluation   Pain Assessment   Pain Assessment Tool 0-10   Pain Score 10 - Worst Possible Pain   Pain Location/Orientation Location: Back  (L shoulder)   Hospital Pain Intervention(s) Medication (See MAR); Repositioned   Restrictions/Precautions   Weight Bearing Precautions Per Order Yes   LLE Weight Bearing Per Order PWB  (Per Dr Selam Lindsay)   Other Precautions Pain; Fall Risk;Multiple lines;WBS   Home Living   Type of [address] [place] [address] Two level;Bed/bath upstairs;1/2 bath on main level  (0STE  Recliner on 1st  1st floor setup possible)   [address] [facility]   Prior Function   Level of [address] with ADLs and functional mobility   Lives With Spouse; Son   ADL Assistance Independent   IADLs Independent   Falls in the last 6 months >10   Vocational Retired   General   Additional Pertinent History Reports 11 falls since January  States that he cannot feel the bottom of his feet  He also states that he was no using an AD at the time of all falls  Family/Caregiver Present No   Cognition   Overall Cognitive Status WFL   Arousal/Participation Alert   Orientation Level Oriented X4   Memory Within functional limits   Following Commands Follows all commands and directions without difficulty   Subjective   Subjective "I have pain but its in my back "   RLE Assessment   RLE Assessment WFL   LLE Assessment   LLE Assessment WFL   Bed Mobility   Supine to Sit 5  Supervision   Additional items HOB elevated   Transfers   Sit to Stand 5  Supervision   Additional items Armrests   Stand to Sit 5  Supervision   Additional items Armrests   Ambulation/Elevation   Gait pattern Step to;Short stride; Foward flexed   Gait Assistance 5  Supervision   Additional items Verbal cues  (For partial WB)   Assistive Device SPC;Rolling walker   Distance 10ft with SPC, 40ft with RW   Ambulation/Elevation Additional Comments Unsteady with SPC  Reaching for additional support  Improved balance and sequencing with use of RW  Able to maintain PWB with RW  Balance   Static Sitting Normal   Dynamic Sitting Good   Static Standing Fair +   Dynamic Standing Fair +   Ambulatory Fair +   Endurance Deficit   Endurance Deficit Yes   Endurance Deficit Description Easily fatigued and limited by pain   Activity Tolerance   Activity Tolerance Patient limited by pain   Medical Staff Made Aware OT present at end of P T  session  Nurse Made Aware Alena SULLIVAN   Assessment   Prognosis Good   Problem List Decreased endurance; Impaired balance;Decreased mobility; Impaired sensation;Obesity;Orthopedic restrictions;Pain   Assessment Patient is a 68y/o M who presented with a diabetic ulcer of the L foot  Plan for OR for debridement tomorrow   Patient also with sepsis and hypertensive urgency  He resides with his spouse in a [address] with 0STE  1st floor setup possible  Patient is independent at baseline and sometimes uses a SPC  Current medical status includes obesity, pain, fall risk, PWB status, multiple lines, decreased balance, endurance and mobility  Patient tolerated session well  Educated on PWB status and demonstration provided for ambulation and stair technique  Patient was at a supervision level for all mobility  He was unsteady when using the [facility] and a RW was recommended  Improve gait and balance  Patient is limited by pain in his low back and L shoulder  His is mildly deconditioned and will benefit from ongoing inpatient P T  Recommending home P T  at discharge and a RW  The patient's AM-PAC Basic Mobility Inpatient Short Form Raw Score is 20  A Raw score of greater than 17 suggests the patient may benefit from discharge to home  Please also refer to the recommendation of the Physical Therapist for safe discharge planning  Barriers to Discharge None   Goals   Patient Goals To get better and go home   STG Expiration Date 08/19/22   Short Term Goal #1 1  Perform supine<>sit with HOB flat without the use of bedrails ind 2  Perform sit<>stand transfers mod I 3  Ambulate 100ft with RW at a mod I level 4  Ascend/descend 12 stairs with railing with nonreciprocal pattern supervision level 4  Maintain PWB on the % of the time   PT Treatment Day 0   Plan   Treatment/Interventions Functional transfer training;Elevations; Therapeutic exercise; Endurance training;Equipment eval/education; Bed mobility;Gait training;Spoke to nursing   PT Frequency 3-5x/wk   Recommendation   PT Discharge Recommendation Home with home health rehabilitation   Equipment Recommended [place] walker   AM-PAC Basic Mobility Inpatient   Turning in Bed Without Bedrails 4   Lying on Back to Sitting on Edge of Flat Bed 4   Moving Bed to Chair 3   Standing Up From Chair 3   Walk in Room 3 Climb 3-5 Stairs 3   Basic Mobility Inpatient Raw Score 20   Basic Mobility Standardized Score 43 99   Highest Level Of Mobility   JH-HLM Goal 6: Walk 10 steps or more   JH-HLM Achieved 7: Walk 25 feet or more   Additional Treatment Session   Start Time 0945   End Time 1967   Treatment Assessment Demonstration provided for stair technique  Patient ascended/descended 2 stairs with 1 railing  2 hands on railing  Nonreciprocal pattern  Supervision level  Able to maintain % of the time  End of Consult   Patient Position at End of Consult Bedside chair; All needs within reach   End of Consult Comments OT present at end of session  Chetan Page PT               Patient Name: Salome Meadows  COLEMANZE'H Date: 8/12/2022

## 2022-08-12 NOTE — ASSESSMENT & PLAN NOTE
· Met sepsis due to tachycardia, WBC 15 25  Source of infection cellulitis of diabetic ulcer on left foot  R/O osteo   · Lactic WNL     · Procal 0 08   · IV cefepime and IV vancomycin  · BC NGTD  · Wound cx growing +1 g positive cocci in pairs with rare Gram-negative rods

## 2022-08-12 NOTE — ANESTHESIA PREPROCEDURE EVALUATION
Procedure:  INCISION AND DRAINAGE (I&D) EXTREMITY (Left Foot)  FBS-66 @ 1620, NPO past 0900  Relevant Problems   CARDIO   (+) Hypercholesterolemia   (+) Hypertensive urgency      GI/HEPATIC   (+) Cirrhosis, alcoholic (HCC)   (+) Liver disease      MUSCULOSKELETAL   (+) Bilateral adhesive capsulitis of shoulders   (+) Lumbar spondylosis      NEURO/PSYCH   (+) Diabetic peripheral neuropathy (HCC)      Endocrine   (+) Diabetic ulcer of left foot associated with type 2 diabetes mellitus ([facility] )      Other   (+) Alcoholism in remission ([facility] )        Physical Exam    Airway    Mallampati score: II  TM Distance: >3 FB  Neck ROM: full     Dental   Comment: Multiple Missing Teeth,     Cardiovascular      Pulmonary      Other Findings        Anesthesia Plan  ASA Score- 3     Anesthesia Type- IV sedation with anesthesia with ASA Monitors  Additional Monitors:   Airway Plan:           Plan Factors-Exercise tolerance (METS): <4 METS  Chart reviewed  Patient is not a current smoker  Induction- intravenous  Postoperative Plan-     Informed Consent- Anesthetic plan and risks discussed with patient  I personally reviewed this patient with the CRNA  Discussed and agreed on the Anesthesia Plan with the CRNA         FBS-334 @ 1112 ,Covered with 5 u Humalog 1221    Lab Results   Component Value Date    GLUC 161 (H) 08/12/2022    ALT 15 08/09/2022    AST 25 08/09/2022    BUN 8 08/12/2022    CALCIUM 9 2 08/12/2022    CL 99 08/12/2022    CO2 31 08/12/2022    CREATININE 0 81 08/12/2022    HCT 39 1 08/12/2022    HGB 12 4 08/12/2022    HGBA1C 6 5 (H) 08/10/2022    MG 1 6 08/11/2022     08/12/2022    K 4 1 08/12/2022    WBC 10 08 08/12/2022

## 2022-08-12 NOTE — OP NOTE
OPERATIVE REPORT - Podiatry  PATIENT NAME: Nawaf Michelle    :  1955  MRN: [id number]  Pt Location:  OR ROOM 02    SURGERY DATE: 2022    Surgeon(s) and Role:     * Taty [place] DPM - Primary    Pre-op Diagnosis:  Diabetic foot ulcer ([facility] ) [J32 344, E87 286]    Post-Op Diagnosis Codes:     * Diabetic foot ulcer ([facility] ) [E11 621, L97 509]    Procedure(s) (LRB):  INCISION AND DRAINAGE (I&D) EXTREMITY (Left)    Specimen(s):  ID Type Source Tests Collected by Time Destination   A :  Tissue Foot, Left ANAEROBIC CULTURE AND GRAM STAIN, CULTURE, TISSUE AND GRAM STAIN Taty Oglesby DPM 2022        Estimated Blood Loss:   Minimal    Drains:  * No LDAs found *    Anesthesia Type:   IV Sedation with Anesthesia with 17 ml of 0 5% Bupivacaine and 1% Lidocaine in a 1:1 mixture    Hemostasis:  - Pneumatic Ankle Tourniquet  - Manual compression  - Atraumatic technique    Materials:  * No implants in log *  1/4in Iodofrom packing  4-0 Nylon Suture     Operative Findings:  Grey-necrotic soft tissue present along medial incision  Complications:   None    Procedure and Technique:     Under mild sedation, the patient was brought into the operating room and placed on the operating room table in the supine  position  IV sedation was achieved by anesthesia team and a universal timeout was performed where all parties are in agreement of correct patient, correct procedure and correct site  A pneumatic tourniquet was then placed over the patient's left lower extremity with ample padding  A osuna block was performed consisting of 17 ml of 0 5% Bupivacaine and 1% Lidocaine in a 1:1 mixture  The foot was then prepped and draped in the usual aseptic manner  An esmarch bandage was used to exsangunate the foot and the pneumatic tourniquet was left in place, however was not inflated       Attention was directed to the dorsomedial left foot, where a surgical marking pen was used to draw out planned incision line, taking into consideration underlying neurovascular structures  Utilizing a 15-blade, an approximately 2cm incision was made on the medial left foot  Incision was carried deep with osuna scissors and blunt dissection, taking care to avoid critical neurovascular structures  Grey and necrotic subcutaneous tissue was observed in the surgical wound  All necrotic, infected, and non-viable soft tissue was then resected until adequate clean margins were obtained  Wound was then flushed with copious amount of normal sterile saline  Approximately 12 inches of 1/4in Iodoform packing was then placed into the surgical wound to close remaining dead space after soft tissue resection  Skin edges were then re-approximated utilizing 4-0 Nylon suture in a horizontal mattress technique  Surgical site was then covered with Adaptic, Maxorb, sterile 4x4 gauze, Kerlix, and ACE bandage  The patient tolerated the procedure and anesthesia well without immediate complications and transferred to PACU with vital signs stable  As with many limb salvage procedures, we contemplate the possibility of performing further stages to this procedure  Procedures may include debridements, delayed closure, plastic surgery techniques, or more proximal amputations  This procedure may be considered part of a multi-staged limb salvage treatment plan  Dr Faizan Hernández was present during the entire procedure and participated in all key aspects  SIGNATURE: Stanly Dubin, DPM  DATE: August 12, 2022  TIME: 6:35 PM      Portions of the record may have been created with voice recognition software  Occasional wrong word or "sound a like" substitutions may have occurred due to the inherent limitations of voice recognition software  Read the chart carefully and recognize, using context, where substitutions have occurred

## 2022-08-12 NOTE — TELEPHONE ENCOUNTER
S/w pt's wife, stated that the pt is inpt at [facility], will be having surgery on his L foot tonight  Advised the pt's wife, please have the pt cb when he is dc and ready to resume pain tx options  Pt's wife verbalized understanding and appreciation

## 2022-08-12 NOTE — PROGRESS NOTES
Vancomycin IV Pharmacy-to-Dose Consultation    Nawaf Michelle is a 74 y o  male who is currently receiving Vancomycin IV with management by the Pharmacy Consult service  Assessment/Plan:  The patient was reviewed  Renal function is stable and no signs or symptoms of nephrotoxicity and/or infusion reactions were documented in the chart  Based on todays assessment, continue current vancomycin (day # 4) dosing of 1750 mg Q12H , with a plan for trough to be drawn at 10:30 on 08/13/22  We will continue to follow the patients culture results and clinical progress daily      Jacques Gibson, Pharmacist

## 2022-08-12 NOTE — OCCUPATIONAL THERAPY NOTE
Occupational Therapy Evaluation     Patient Name: Fabio Knapp  Today's Date: 8/12/2022  Problem List  Principal Problem:    Diabetic ulcer of left foot associated with type 2 diabetes mellitus ([facility] )  Active Problems:    Hypercholesterolemia    Sepsis ([facility] )    Hypertensive urgency    Past Medical History  Past Medical History:   Diagnosis Date    Anxiety     Arthritis     Cancer ([facility] )     Depression     Diabetes mellitus ([facility] )     Hypertension     Liver disease     Mitral valve prolapse     Thyroid disease      Past Surgical History  Past Surgical History:   Procedure Laterality Date    JOINT REPLACEMENT Left 03/11/2022    Left TSA    THYROID SURGERY  2021    remove cancer           08/12/22 1007   OT Last Visit   OT Visit Date 08/12/22   Note Type   Note type Evaluation   Additional Comments Pt pending OR tomorrow  Restrictions/Precautions   Weight Bearing Precautions Per Order Yes   LLE Weight Bearing Per Order PWB  (Per Dr Gerrie Dandy)   Other Precautions Fall Risk;Pain;WBS   Pain Assessment   Pain Assessment Tool 0-10   Pain Score 10 - Worst Possible Pain   Pain Location/Orientation Location: Back   Hospital Pain Intervention(s) Repositioned; Rest   Home Living   Type of [address] [place] [address] Two level;Bed/bath upstairs;1/2 bath on main level; Able to live on main level with bedroom/bathroom   Bathroom Shower/Tub Walk-in shower  (on 2nd floor)   [address]   (no AD)   Home Equipment [place]   Prior Function   Level of [place] Independent with ADLs and functional mobility   Lives With Spouse; Son   ADL Assistance Independent   IADLs Independent   Falls in the last 6 months >10   Vocational Retired   Subjective   Subjective Pt received seated in recliner  Pt agreeable to session     ADL   Eating Assistance 7  Independent   Grooming Assistance 7  Independent   UB Bathing Assistance 5  Supervision/Setup   LB Bathing Assistance 5  Supervision/Setup   UB Dressing Assistance 5 Supervision/Setup   LB Dressing Assistance 5  Supervision/Setup   Toileting Assistance  5  Supervision/Setup   Functional Assistance 5  Supervision/Setup   Bed Mobility   Additional Comments Pt received OOB  Transfers   Sit to Stand 5  Supervision   Additional items Armrests   Stand to Sit 5  Supervision   Additional items Armrests   Balance   Static Sitting Normal   Dynamic Sitting Good   Static Standing Fair +   Dynamic Standing Fair +   Activity Tolerance   Activity Tolerance Patient limited by pain   Medical Staff Made Aware PT Gia   RUE Assessment   RUE Assessment WFL   LUE Assessment   LUE Assessment X  (shoulder 0-90 degrees (baseline limitation per pt))   Cognition   Overall Cognitive Status WFL   Arousal/Participation Alert   Attention Within functional limits   Orientation Level Oriented X4   Memory Within functional limits   Following Commands Follows all commands and directions without difficulty   Assessment   Prognosis Good   Assessment Pt is a 74 y o  male seen for OT evaluation at [facility], admitted 8/9/2022 w/ Diabetic ulcer of left foot associated with type 2 diabetes mellitus ([facility] [address] )  MRI indicated no underlying osteo  Plan for OR I&D/debridement on 8/13  Per Dr Rodriguez Daigle, pt is currently PWB with LLE  OT completed expanded  review of pt's medical and social history  Comorbidities affecting pt's functional performance at time of assessment include: Diabetic peripheral neuropathy, liver disease, lumbar spondylosis, b/l adhesive capsulitis of shoulders, and sepsis  Personal factors affecting pt at time of IE include:steps to enter environment, difficulty performing ADLS, difficulty performing IADLS  and decreased functional mobility  Prior to admission, pt was living with family in house with 1st floor set-up  Pt was I  ADLS and IADLS, & required cane PTA   Upon evaluation: Pt requires supervision for functional mobility/transfers, sup for UB ADLs and sup for LB ADLS 2* the following deficits impacting occupational performance: increased pain and orthopedic restrictions  Full objective findings from OT assessment regarding body systems outlined above  Pt educated during this encounter re: ADL strategies as well as appropriate use of DME/AD to maximize safety and independence within home  Based on findings, pt is of moderate complexity  The patient's raw score on the AM-PAC Daily Activity inpatient short form is 21, standardized score is 44 27, greater than 39 4  Patients at this level are likely to benefit from DC to home, which DOES coincide with CURRENT above OT recommendations  However please refer to therapist recommendation for discharge planning given other factors that may influence destination  At this time, OT recommendations at time of discharge are home with no OT needs  Recommend continued PT services as well as use of commode and shower chair once cleared by MD to bathe  Goals   Patient Goals Pt wishes to get better   Plan   Treatment Interventions ADL retraining;Functional transfer training;Patient/family training; Compensatory technique education  (Pt provided with ADL education this encounter )   Goal Expiration Date 08/22/22   OT Treatment Day 0   OT Frequency Eval only; D/C from OT   Recommendation   OT Discharge Recommendation No OT rehabilitation needs  Recommend ongoing PT services  Equipment Recommended Bedside commode  (Also recommend shower chair if pt is cleared to bathe post-op/ is able to manage steps safety to  2nd floor bathroom where shower is located  )   AM-PAC Daily Activity Inpatient   Lower Body Dressing 3   Bathing 3   Toileting 3   Upper Body Dressing 4   Grooming 4   Eating 4   Daily Activity Raw Score 21   Daily Activity Standardized Score (Calc for Raw Score >=11) 44 27   AM-PAC Applied Cognition Inpatient   Following a Speech/Presentation 4   Understanding Ordinary Conversation 4   Taking Medications 4   Remembering Where Things Are Placed or Put Away 4   Remembering List of 4-5 Errands 4   Taking Care of Complicated Tasks 4   Applied Cognition Raw Score 24   Applied Cognition Standardized Score 62 21             Ramin Aguayo OTR/L

## 2022-08-12 NOTE — PLAN OF CARE
Problem: Potential for Falls  Goal: Patient will remain free of falls  Description: INTERVENTIONS:  - Educate patient/family on patient safety including physical limitations  - Instruct patient to call for assistance with activity   - Consult OT/PT to assist with strengthening/mobility   - Keep Call bell within reach  - Keep bed low and locked with side rails adjusted as appropriate  - Keep care items and personal belongings within reach  - Initiate and maintain comfort rounds  - Make Fall Risk Sign visible to staff  - Offer Toileting every 2 Hours, in advance of need  - Initiate/Maintain bed alarm  - Obtain necessary fall risk management equipment:   - Apply yellow socks and bracelet for high fall risk patients  - Consider moving patient to room near nurses station  Outcome: Progressing     Problem: METABOLIC, FLUID AND ELECTROLYTES - ADULT  Goal: Electrolytes maintained within normal limits  Description: INTERVENTIONS:  - Monitor labs and assess patient for signs and symptoms of electrolyte imbalances  - Administer electrolyte replacement as ordered  - Monitor response to electrolyte replacements, including repeat lab results as appropriate  - Instruct patient on fluid and nutrition as appropriate  Outcome: Progressing     Problem: SKIN/TISSUE INTEGRITY - ADULT  Goal: Skin Integrity remains intact(Skin Breakdown Prevention)  Description: Assess:  -Perform [place] assessment  -Clean and moisturize skin  -Inspect skin when repositioning, toileting, and assisting with ADLS  -Assess under medical devices  -Assess extremities for adequate circulation and sensation     Bed Management:  -Have minimal linens on bed & keep smooth, unwrinkled  -Change linens as needed when moist or perspiring  -Avoid sitting or lying in one position for more than 2 hours while in bed  -Keep HOB at 30 degrees     Toileting:  -Offer bedside commode  -Assess for incontinence  -Use incontinent care products after each incontinent episode    Activity:  -Mobilize patient 2 times a day  -Encourage activity and walks on unit  -Encourage or provide ROM exercises   -Turn and reposition patient every 2 Hours  -Use appropriate equipment to lift or move patient in bed  -Instruct/ Assist with weight shifting when out of bed in chair  -Consider limitation of chair time 2 hour intervals    Skin Care:  -Avoid use of baby powder, tape, friction and shearing, hot water or constrictive clothing  -Relieve pressure over bony prominences  -Do not massage red bony areas    Next Steps:  -Teach patient strategies to minimize risks   -Consider consults to  interdisciplinary teams   Outcome: Progressing  Goal: Incision(s), wounds(s) or drain site(s) healing without S/S of infection  Description: INTERVENTIONS  - Assess and document dressing, incision, wound bed, drain sites and surrounding tissue  - Provide patient and family education  - Perform skin care/dressing changes  Outcome: Progressing     Problem: HEMATOLOGIC - ADULT  Goal: Maintains hematologic stability  Description: INTERVENTIONS  - Assess for signs and symptoms of bleeding or hemorrhage  - Monitor labs  - Administer supportive blood products/factors as ordered and appropriate  Outcome: Progressing     Problem: MUSCULOSKELETAL - ADULT  Goal: Maintain or return mobility to safest level of function  Description: INTERVENTIONS:  - Assess patient's ability to carry out ADLs; assess patient's baseline for ADL function and identify physical deficits which impact ability to perform ADLs (bathing, care of mouth/teeth, toileting, grooming, dressing, etc )  - Assess/evaluate cause of self-care deficits   - Assess range of motion  - Assess patient's mobility  - Assess patient's need for assistive devices and provide as appropriate  - Encourage maximum independence but intervene and supervise when necessary  - Involve family in performance of ADLs  - Assess for home care needs following discharge   - Consider OT consult to assist with ADL evaluation and planning for discharge  - Provide patient education as appropriate  Outcome: Progressing     Problem: PAIN - ADULT  Goal: Verbalizes/displays adequate comfort level or baseline comfort level  Description: Interventions:  - Encourage patient to monitor pain and request assistance  - Assess pain using appropriate pain scale  - Administer analgesics based on type and severity of pain and evaluate response  - Implement non-pharmacological measures as appropriate and evaluate response  - Consider cultural and social influences on pain and pain management  - Notify physician/advanced practitioner if interventions unsuccessful or patient reports new pain  Outcome: Progressing     Problem: INFECTION - ADULT  Goal: Absence or prevention of progression during hospitalization  Description: INTERVENTIONS:  - Assess and monitor for signs and symptoms of infection  - Monitor lab/diagnostic results  - Monitor all insertion sites, i e  indwelling lines, tubes, and drains  - Monitor endotracheal if appropriate and nasal secretions for changes in amount and color  - [place] appropriate cooling/warming therapies per order  - Administer medications as ordered  - Instruct and encourage patient and family to use good hand hygiene technique  - Identify and instruct in appropriate isolation precautions for identified infection/condition  Outcome: Progressing     Problem: SAFETY ADULT  Goal: Patient will remain free of falls  Description: INTERVENTIONS:  - Educate patient/family on patient safety including physical limitations  - Instruct patient to call for assistance with activity   - Consult OT/PT to assist with strengthening/mobility   - Keep Call bell within reach  - Keep bed low and locked with side rails adjusted as appropriate  - Keep care items and personal belongings within reach  - Initiate and maintain comfort rounds  - Make Fall Risk Sign visible to staff  - Offer Toileting every 2 Hours, in advance of need  - Initiate/Maintain bed alarm  - Obtain necessary fall risk management equipment:   - Apply yellow socks and bracelet for high fall risk patients  - Consider moving patient to room near nurses station  Outcome: Progressing     Problem: DISCHARGE PLANNING  Goal: Discharge to home or other facility with appropriate resources  Description: INTERVENTIONS:  - Identify barriers to discharge w/patient and caregiver  - Arrange for needed discharge resources and transportation as appropriate  - Identify discharge learning needs (meds, wound care, etc )  - Arrange for interpretive services to assist at discharge as needed  - Refer to Case Management Department for coordinating discharge planning if the patient needs post-hospital services based on physician/advanced practitioner order or complex needs related to functional status, cognitive ability, or social support system  Outcome: Progressing     Problem: Knowledge Deficit  Goal: Patient/family/caregiver demonstrates understanding of disease process, treatment plan, medications, and discharge instructions  Description: Complete learning assessment and assess knowledge base  Interventions:  - Provide teaching at level of understanding  - Provide teaching via preferred learning methods  Outcome: Progressing     Problem: Nutrition/Hydration-ADULT  Goal: Nutrient/Hydration intake appropriate for improving, restoring or maintaining nutritional needs  Description: Monitor and assess patient's nutrition/hydration status for malnutrition  Collaborate with interdisciplinary team and initiate plan and interventions as ordered  Monitor patient's weight and dietary intake as ordered or per policy  Utilize nutrition screening tool and intervene as necessary  Determine patient's food preferences and provide high-protein, high-caloric foods as appropriate       INTERVENTIONS:  - Monitor oral intake, urinary output, labs, and treatment plans  - Assess nutrition and hydration status and recommend course of action  - Evaluate amount of meals eaten  - Assist patient with eating if necessary   - Allow adequate time for meals  - Recommend/ encourage appropriate diets, oral nutritional supplements, and vitamin/mineral supplements  - Order, calculate, and assess calorie counts as needed  - Recommend, monitor, and adjust tube feedings and TPN/PPN based on assessed needs  - Assess need for intravenous fluids  - Provide specific nutrition/hydration education as appropriate  - Include patient/family/caregiver in decisions related to nutrition  Outcome: Progressing     Problem: MOBILITY - ADULT  Goal: Maintain or return to baseline ADL function  Description: INTERVENTIONS:  -  Assess patient's ability to carry out ADLs; assess patient's baseline for ADL function and identify physical deficits which impact ability to perform ADLs (bathing, care of mouth/teeth, toileting, grooming, dressing, etc )  - Assess/evaluate cause of self-care deficits   - Assess range of motion  - Assess patient's mobility; develop plan if impaired  - Assess patient's need for assistive devices and provide as appropriate  - Encourage maximum independence but intervene and supervise when necessary  - Involve family in performance of ADLs  - Assess for home care needs following discharge   - Consider OT consult to assist with ADL evaluation and planning for discharge  - Provide patient education as appropriate  Outcome: Progressing  Goal: Maintains/Returns to pre admission functional level  Description: INTERVENTIONS:  - Perform BMAT or MOVE assessment daily    - Set and communicate daily mobility goal to care team and patient/family/caregiver  - Collaborate with rehabilitation services on mobility goals if consulted  - Perform Range of Motion 4 times a day  - Reposition patient every 2 hours    - Dangle patient 2 times a day  - Stand patient 2 times a day  - Ambulate patient 2 times a day  - Out of bed to chair 2 times a day   - Out of bed for meals 2 times a day  - Out of bed for toileting  - Record patient progress and toleration of activity level   Outcome: Progressing

## 2022-08-12 NOTE — CASE MANAGEMENT
Case Management Discharge Planning Note    Patient name Steven Olivas  Location [address] [id number]/-[id number] MRN [id number]  : 1955 Date 2022       Current Admission Date: 2022  Current Admission Diagnosis:Diabetic ulcer of left foot associated with type 2 diabetes mellitus [facility])   Patient Active Problem List    Diagnosis Date Noted    Liver disease 08/10/2022    Diabetic ulcer of left foot associated with type 2 diabetes mellitus ([facility] ) 2022    Sepsis ([facility] ) 2022    Hypertensive urgency 2022    Bronchitis, mucopurulent recurrent ([facility] ) 2022    Cirrhosis, alcoholic ([facility] )     Diabetic peripheral neuropathy ([facility] ) 2022    Herniated nucleus pulposus of lumbosacral region 2022    Thyroid cancer ([facility] ) 2021    Alcoholism in remission ([facility] ) 2021    [facility] 2021    Bilateral adhesive capsulitis of shoulders 2021    DM (diabetes mellitus) ([facility] ) 2021    Hypercholesterolemia 2021    Lumbar spondylosis 2021      LOS (days): 3  Geometric Mean LOS (GMLOS) (days): 3 50  Days to GMLOS:0 8     OBJECTIVE:  Risk of Unplanned Readmission Score: 15 63         Current admission status: Inpatient   Preferred Pharmacy:   [facility] [address], [address] [facility]   [address]   [address] [place] [address]  Phone: [phone] Fax: [phone]    Primary Care Provider: Vick Long DO    Primary Insurance: MEDICARE  Secondary Insurance: [facility]    DISCHARGE DETAILS:    Freedom of Choice: Yes    Other Referral/Resources/Interventions Provided:  Interventions: [facility]  Referral Comments: referral to [facility]    Treatment Team Recommendation: [facility]  Discharge Destination Plan[de-identified] Home with [place] at Discharge : Family     IMM Given (Date):: 22  IMM Given to[de-identified] Patient     Additional Comments: Met with pt to discuss therapies recommendation for home therapy, a RW, and a bedside commode  Pt is agreeable to same  Pt is requesting -C and [facility] for DC  Referrals sent for both  -C accepted  CM added to pt's dc instructions  Awaiting approved for DME prior to delivery

## 2022-08-12 NOTE — TELEPHONE ENCOUNTER
Provider aware and agree with recommendations  For now we will await a return phone call from the patient when he is doing better  Thank you

## 2022-08-12 NOTE — PLAN OF CARE
Problem: PHYSICAL THERAPY ADULT  Goal: Performs mobility at highest level of function for planned discharge setting  See evaluation for individualized goals  Description: Treatment/Interventions: Functional transfer training, Elevations, Therapeutic exercise, Endurance training, Equipment eval/education, Bed mobility, Gait training, Spoke to nursing  Equipment Recommended: Luis F Dyson       See flowsheet documentation for full assessment, interventions and recommendations  Note: Prognosis: Good  Problem List: Decreased endurance, Impaired balance, Decreased mobility, Impaired sensation, Obesity, Orthopedic restrictions, Pain  Assessment: Patient is a 68y/o M who presented with a diabetic ulcer of the L foot  Plan for OR for debridement tomorrow  Patient also with sepsis and hypertensive urgency  He resides with his spouse in a [facility] with 0STE  1st floor setup possible  Patient is independent at baseline and sometimes uses a SPC  Current medical status includes obesity, pain, fall risk, PWB status, multiple lines, decreased balance, endurance and mobility  Patient tolerated session well  Educated on PWB status and demonstration provided for ambulation and stair technique  Patient was at a supervision level for all mobility  He was unsteady when using the [facility] and a RW was recommended  Improve gait and balance  Patient is limited by pain in his low back and L shoulder  His is mildly deconditioned and will benefit from ongoing inpatient P T  Recommending home P T  at discharge and a RW  The patient's AM-PAC Basic Mobility Inpatient Short Form Raw Score is 20  A Raw score of greater than 17 suggests the patient may benefit from discharge to home  Please also refer to the recommendation of the Physical Therapist for safe discharge planning  Barriers to Discharge: None     PT Discharge Recommendation: Home with home health rehabilitation    See flowsheet documentation for full assessment

## 2022-08-12 NOTE — CASE MANAGEMENT
Case Management Progress Note    Patient name Jenna Roach  Location [address] 232/-58 MRN [id number]  : 1955 Date 2022       LOS (days): 3  Geometric Mean LOS (GMLOS) (days): 3 50  Days to GMLOS:0 7        OBJECTIVE:        Current admission status: Inpatient  Preferred Pharmacy:   [facility] [address], [address] [facility]   [address]   [address] [place] [address]  Phone: [phone] Fax: [phone]    Primary Care Provider: Gala Gamez DO    Primary Insurance: MEDICARE  Secondary Insurance: [facility]    PROGRESS NOTE:    RW and bedside commode approved with $0 copay  CM delivered both to pt's room  Delivery ticket signed

## 2022-08-12 NOTE — PLAN OF CARE
Problem: Potential for Falls  Goal: Patient will remain free of falls  Description: INTERVENTIONS:  - Educate patient/family on patient safety including physical limitations  - Instruct patient to call for assistance with activity   - Consult OT/PT to assist with strengthening/mobility   - Keep Call bell within reach  - Keep bed low and locked with side rails adjusted as appropriate  - Keep care items and personal belongings within reach  - Initiate and maintain comfort rounds  - Make Fall Risk Sign visible to staff  - Offer Toileting every 2 Hours, in advance of need  - Initiate/Maintain bed alarm  - Obtain necessary fall risk management equipment:   - Apply yellow socks and bracelet for high fall risk patients  - Consider moving patient to room near nurses station  Outcome: Progressing     Problem: METABOLIC, FLUID AND ELECTROLYTES - ADULT  Goal: Electrolytes maintained within normal limits  Description: INTERVENTIONS:  - Monitor labs and assess patient for signs and symptoms of electrolyte imbalances  - Administer electrolyte replacement as ordered  - Monitor response to electrolyte replacements, including repeat lab results as appropriate  - Instruct patient on fluid and nutrition as appropriate  Outcome: Progressing     Problem: SKIN/TISSUE INTEGRITY - ADULT  Goal: Skin Integrity remains intact(Skin Breakdown Prevention)  Description: Assess:  -Perform Sanjay assessment  -Clean and moisturize skin  -Inspect skin when repositioning, toileting, and assisting with ADLS  -Assess under medical devices  -Assess extremities for adequate circulation and sensation     Bed Management:  -Have minimal linens on bed & keep smooth, unwrinkled  -Change linens as needed when moist or perspiring  -Avoid sitting or lying in one position for more than 2 hours while in bed  -Keep HOB at 30 degrees     Toileting:  -Offer bedside commode  -Assess for incontinence  -Use incontinent care products after each incontinent episode    Activity:  -Mobilize patient 2 times a day  -Encourage activity and walks on unit  -Encourage or provide ROM exercises   -Turn and reposition patient every 2 Hours  -Use appropriate equipment to lift or move patient in bed  -Instruct/ Assist with weight shifting when out of bed in chair  -Consider limitation of chair time 2 hour intervals    Skin Care:  -Avoid use of baby powder, tape, friction and shearing, hot water or constrictive clothing  -Relieve pressure over bony prominences  -Do not massage red bony areas    Next Steps:  -Teach patient strategies to minimize risks   -Consider consults to  interdisciplinary teams   Outcome: Progressing  Goal: Incision(s), wounds(s) or drain site(s) healing without S/S of infection  Description: INTERVENTIONS  - Assess and document dressing, incision, wound bed, drain sites and surrounding tissue  - Provide patient and family education  - Perform skin care/dressing changes  Outcome: Progressing     Problem: HEMATOLOGIC - ADULT  Goal: Maintains hematologic stability  Description: INTERVENTIONS  - Assess for signs and symptoms of bleeding or hemorrhage  - Monitor labs  - Administer supportive blood products/factors as ordered and appropriate  Outcome: Progressing     Problem: MUSCULOSKELETAL - ADULT  Goal: Maintain or return mobility to safest level of function  Description: INTERVENTIONS:  - Assess patient's ability to carry out ADLs; assess patient's baseline for ADL function and identify physical deficits which impact ability to perform ADLs (bathing, care of mouth/teeth, toileting, grooming, dressing, etc )  - Assess/evaluate cause of self-care deficits   - Assess range of motion  - Assess patient's mobility  - Assess patient's need for assistive devices and provide as appropriate  - Encourage maximum independence but intervene and supervise when necessary  - Involve family in performance of ADLs  - Assess for home care needs following discharge   - Consider OT consult to assist with ADL evaluation and planning for discharge  - Provide patient education as appropriate  Outcome: Progressing     Problem: PAIN - ADULT  Goal: Verbalizes/displays adequate comfort level or baseline comfort level  Description: Interventions:  - Encourage patient to monitor pain and request assistance  - Assess pain using appropriate pain scale  - Administer analgesics based on type and severity of pain and evaluate response  - Implement non-pharmacological measures as appropriate and evaluate response  - Consider cultural and social influences on pain and pain management  - Notify physician/advanced practitioner if interventions unsuccessful or patient reports new pain  Outcome: Progressing     Problem: INFECTION - ADULT  Goal: Absence or prevention of progression during hospitalization  Description: INTERVENTIONS:  - Assess and monitor for signs and symptoms of infection  - Monitor lab/diagnostic results  - Monitor all insertion sites, i e  indwelling lines, tubes, and drains  - Monitor endotracheal if appropriate and nasal secretions for changes in amount and color  - [place] appropriate cooling/warming therapies per order  - Administer medications as ordered  - Instruct and encourage patient and family to use good hand hygiene technique  - Identify and instruct in appropriate isolation precautions for identified infection/condition  Outcome: Progressing     Problem: SAFETY ADULT  Goal: Patient will remain free of falls  Description: INTERVENTIONS:  - Educate patient/family on patient safety including physical limitations  - Instruct patient to call for assistance with activity   - Consult OT/PT to assist with strengthening/mobility   - Keep Call bell within reach  - Keep bed low and locked with side rails adjusted as appropriate  - Keep care items and personal belongings within reach  - Initiate and maintain comfort rounds  - Make Fall Risk Sign visible to staff  - Offer Toileting every 2 Hours, in advance of need  - Initiate/Maintain bed alarm  - Obtain necessary fall risk management equipment:   - Apply yellow socks and bracelet for high fall risk patients  - Consider moving patient to room near nurses station  Outcome: Progressing     Problem: DISCHARGE PLANNING  Goal: Discharge to home or other facility with appropriate resources  Description: INTERVENTIONS:  - Identify barriers to discharge w/patient and caregiver  - Arrange for needed discharge resources and transportation as appropriate  - Identify discharge learning needs (meds, wound care, etc )  - Arrange for interpretive services to assist at discharge as needed  - Refer to Case Management Department for coordinating discharge planning if the patient needs post-hospital services based on physician/advanced practitioner order or complex needs related to functional status, cognitive ability, or social support system  Outcome: Progressing     Problem: Knowledge Deficit  Goal: Patient/family/caregiver demonstrates understanding of disease process, treatment plan, medications, and discharge instructions  Description: Complete learning assessment and assess knowledge base  Interventions:  - Provide teaching at level of understanding  - Provide teaching via preferred learning methods  Outcome: Progressing     Problem: Nutrition/Hydration-ADULT  Goal: Nutrient/Hydration intake appropriate for improving, restoring or maintaining nutritional needs  Description: Monitor and assess patient's nutrition/hydration status for malnutrition  Collaborate with interdisciplinary team and initiate plan and interventions as ordered  Monitor patient's weight and dietary intake as ordered or per policy  Utilize nutrition screening tool and intervene as necessary  Determine patient's food preferences and provide high-protein, high-caloric foods as appropriate       INTERVENTIONS:  - Monitor oral intake, urinary output, labs, and treatment plans  - Assess nutrition and hydration status and recommend course of action  - Evaluate amount of meals eaten  - Assist patient with eating if necessary   - Allow adequate time for meals  - Recommend/ encourage appropriate diets, oral nutritional supplements, and vitamin/mineral supplements  - Order, calculate, and assess calorie counts as needed  - Recommend, monitor, and adjust tube feedings and TPN/PPN based on assessed needs  - Assess need for intravenous fluids  - Provide specific nutrition/hydration education as appropriate  - Include patient/family/caregiver in decisions related to nutrition  Outcome: Progressing

## 2022-08-12 NOTE — ASSESSMENT & PLAN NOTE
· Presented due to new ulcer on his inner left foot with swelling and erythema  Saw podiatry outpatient on 8/9 who recommended he go to the ER for osteomyelitis workup  · Denies fevers or chills   · Met sepsis criteria due to tachycardia, WBC 15 25   · Lactic and procalcitonin WNL  · CRP 88 6  · Sed rate 83  · Home regimen: Glipizide 10 mg bid, Admelog bid, jardiance and metformin   · Hold oral hypoglycemics   · SSI   · A1c 6 5  · Xray: No radiographic evidence of osteomyelitis  · Continue IV cefepime and vancomycin  · MRI:Plantar skin ulceration 1st MTP with subcutaneous edema indicating cellulitis without evidence of drainable abscess collection in this unenhanced study  No evidence of T1 replacement signal to indicate osteomyelitis at this time     · Consult Podiatry   · NPO at midnight, 1/2 lantus  · I&D tomorrow

## 2022-08-13 LAB
ANION GAP SERPL CALCULATED.3IONS-SCNC: 7 MMOL/L (ref 4–13)
BACTERIA WND AEROBE CULT: ABNORMAL
BACTERIA WND AEROBE CULT: ABNORMAL
BUN SERPL-MCNC: 10 MG/DL (ref 5–25)
CALCIUM SERPL-MCNC: 9.5 MG/DL (ref 8.3–10.1)
CHLORIDE SERPL-SCNC: 97 MMOL/L (ref 96–108)
CO2 SERPL-SCNC: 31 MMOL/L (ref 21–32)
CREAT SERPL-MCNC: 0.93 MG/DL (ref 0.6–1.3)
ERYTHROCYTE [DISTWIDTH] IN BLOOD BY AUTOMATED COUNT: 14.7 % (ref 11.6–15.1)
GFR SERPL CREATININE-BSD FRML MDRD: 85 ML/MIN/1.73SQ M
GLUCOSE SERPL-MCNC: 176 MG/DL (ref 65–140)
GLUCOSE SERPL-MCNC: 202 MG/DL (ref 65–140)
GLUCOSE SERPL-MCNC: 204 MG/DL (ref 65–140)
GLUCOSE SERPL-MCNC: 219 MG/DL (ref 65–140)
GLUCOSE SERPL-MCNC: 301 MG/DL (ref 65–140)
GRAM STN SPEC: ABNORMAL
HCT VFR BLD AUTO: 42 % (ref 36.5–49.3)
HGB BLD-MCNC: 13.1 G/DL (ref 12–17)
MCH RBC QN AUTO: 27.6 PG (ref 26.8–34.3)
MCHC RBC AUTO-ENTMCNC: 31.2 G/DL (ref 31.4–37.4)
MCV RBC AUTO: 88 FL (ref 82–98)
PLATELET # BLD AUTO: 432 THOUSANDS/UL (ref 149–390)
PMV BLD AUTO: 9.9 FL (ref 8.9–12.7)
POTASSIUM SERPL-SCNC: 4.6 MMOL/L (ref 3.5–5.3)
RBC # BLD AUTO: 4.75 MILLION/UL (ref 3.88–5.62)
SODIUM SERPL-SCNC: 135 MMOL/L (ref 135–147)
VANCOMYCIN TROUGH SERPL-MCNC: 25.3 UG/ML (ref 10–20)
WBC # BLD AUTO: 10.51 THOUSAND/UL (ref 4.31–10.16)

## 2022-08-13 PROCEDURE — 80048 BASIC METABOLIC PNL TOTAL CA: CPT | Performed by: STUDENT IN AN ORGANIZED HEALTH CARE EDUCATION/TRAINING PROGRAM

## 2022-08-13 PROCEDURE — 80202 ASSAY OF VANCOMYCIN: CPT | Performed by: PHYSICIAN ASSISTANT

## 2022-08-13 PROCEDURE — 85027 COMPLETE CBC AUTOMATED: CPT | Performed by: STUDENT IN AN ORGANIZED HEALTH CARE EDUCATION/TRAINING PROGRAM

## 2022-08-13 PROCEDURE — 82948 REAGENT STRIP/BLOOD GLUCOSE: CPT

## 2022-08-13 PROCEDURE — 99233 SBSQ HOSP IP/OBS HIGH 50: CPT | Performed by: STUDENT IN AN ORGANIZED HEALTH CARE EDUCATION/TRAINING PROGRAM

## 2022-08-13 RX ORDER — DOCUSATE SODIUM 100 MG/1
100 CAPSULE, LIQUID FILLED ORAL 2 TIMES DAILY
Status: DISCONTINUED | OUTPATIENT
Start: 2022-08-13 | End: 2022-08-15 | Stop reason: HOSPADM

## 2022-08-13 RX ORDER — OXYCODONE HYDROCHLORIDE 5 MG/1
5 TABLET ORAL EVERY 6 HOURS PRN
Status: DISCONTINUED | OUTPATIENT
Start: 2022-08-13 | End: 2022-08-15 | Stop reason: HOSPADM

## 2022-08-13 RX ORDER — POLYETHYLENE GLYCOL 3350 17 G/17G
17 POWDER, FOR SOLUTION ORAL DAILY PRN
Status: DISCONTINUED | OUTPATIENT
Start: 2022-08-13 | End: 2022-08-15 | Stop reason: HOSPADM

## 2022-08-13 RX ADMIN — LIDOCAINE 5% 1 PATCH: 700 PATCH TOPICAL at 22:27

## 2022-08-13 RX ADMIN — HEPARIN SODIUM 5000 UNITS: 5000 INJECTION INTRAVENOUS; SUBCUTANEOUS at 13:00

## 2022-08-13 RX ADMIN — TRAMADOL HYDROCHLORIDE 50 MG: 50 TABLET, COATED ORAL at 05:19

## 2022-08-13 RX ADMIN — INSULIN LISPRO 2 UNITS: 100 INJECTION, SOLUTION INTRAVENOUS; SUBCUTANEOUS at 08:47

## 2022-08-13 RX ADMIN — FOLIC ACID 2 MG: 1 TABLET ORAL at 08:49

## 2022-08-13 RX ADMIN — POLYETHYLENE GLYCOL 3350 17 G: 17 POWDER, FOR SOLUTION ORAL at 10:55

## 2022-08-13 RX ADMIN — PROPRANOLOL HYDROCHLORIDE 160 MG: 80 CAPSULE, EXTENDED RELEASE ORAL at 08:49

## 2022-08-13 RX ADMIN — DOCUSATE SODIUM 100 MG: 100 CAPSULE, LIQUID FILLED ORAL at 06:13

## 2022-08-13 RX ADMIN — MIRTAZAPINE 45 MG: 15 TABLET, FILM COATED ORAL at 22:27

## 2022-08-13 RX ADMIN — OXYCODONE HYDROCHLORIDE 5 MG: 5 TABLET ORAL at 14:39

## 2022-08-13 RX ADMIN — FLUTICASONE PROPIONATE 2 SPRAY: 50 SPRAY, METERED NASAL at 08:48

## 2022-08-13 RX ADMIN — TRAMADOL HYDROCHLORIDE 50 MG: 50 TABLET, COATED ORAL at 17:22

## 2022-08-13 RX ADMIN — CEFEPIME HYDROCHLORIDE 2000 MG: 2 INJECTION, SOLUTION INTRAVENOUS at 20:28

## 2022-08-13 RX ADMIN — NIFEDIPINE 120 MG: 30 TABLET, FILM COATED, EXTENDED RELEASE ORAL at 08:48

## 2022-08-13 RX ADMIN — DOCUSATE SODIUM 100 MG: 100 CAPSULE, LIQUID FILLED ORAL at 17:18

## 2022-08-13 RX ADMIN — BACLOFEN 10 MG: 10 TABLET ORAL at 08:48

## 2022-08-13 RX ADMIN — INSULIN LISPRO 4 UNITS: 100 INJECTION, SOLUTION INTRAVENOUS; SUBCUTANEOUS at 10:45

## 2022-08-13 RX ADMIN — HEPARIN SODIUM 5000 UNITS: 5000 INJECTION INTRAVENOUS; SUBCUTANEOUS at 20:29

## 2022-08-13 RX ADMIN — HEPARIN SODIUM 5000 UNITS: 5000 INJECTION INTRAVENOUS; SUBCUTANEOUS at 05:17

## 2022-08-13 RX ADMIN — LORATADINE 10 MG: 10 TABLET ORAL at 08:48

## 2022-08-13 RX ADMIN — INSULIN LISPRO 1 UNITS: 100 INJECTION, SOLUTION INTRAVENOUS; SUBCUTANEOUS at 17:18

## 2022-08-13 RX ADMIN — DULOXETINE 60 MG: 30 CAPSULE, DELAYED RELEASE ORAL at 08:48

## 2022-08-13 RX ADMIN — VANCOMYCIN HYDROCHLORIDE 1750 MG: 5 INJECTION, POWDER, LYOPHILIZED, FOR SOLUTION INTRAVENOUS at 10:38

## 2022-08-13 RX ADMIN — INSULIN GLARGINE 19 UNITS: 100 INJECTION, SOLUTION SUBCUTANEOUS at 22:27

## 2022-08-13 RX ADMIN — LOSARTAN POTASSIUM 100 MG: 50 TABLET, FILM COATED ORAL at 08:48

## 2022-08-13 RX ADMIN — DULOXETINE 60 MG: 30 CAPSULE, DELAYED RELEASE ORAL at 20:29

## 2022-08-13 RX ADMIN — SERTRALINE HYDROCHLORIDE 75 MG: 25 TABLET ORAL at 08:48

## 2022-08-13 RX ADMIN — CEFEPIME HYDROCHLORIDE 2000 MG: 2 INJECTION, SOLUTION INTRAVENOUS at 08:49

## 2022-08-13 RX ADMIN — INSULIN LISPRO 2 UNITS: 100 INJECTION, SOLUTION INTRAVENOUS; SUBCUTANEOUS at 23:05

## 2022-08-13 RX ADMIN — ASPIRIN 81 MG: 81 TABLET, COATED ORAL at 08:48

## 2022-08-13 RX ADMIN — HYDROCHLOROTHIAZIDE 25 MG: 25 TABLET ORAL at 08:48

## 2022-08-13 RX ADMIN — PRAVASTATIN SODIUM 20 MG: 20 TABLET ORAL at 17:18

## 2022-08-13 NOTE — PROGRESS NOTES
[place]  Progress Note - Josiane Howard 1955, 74 y o  male MRN: [id number]  Unit/Bed#: -01 Encounter: [id number]  Primary Care Provider: Denise Paige DO   Date and time admitted to hospital: 8/9/2022  6:29 PM    * Diabetic ulcer of left foot associated with type 2 diabetes mellitus ([facility] [address] )  Assessment & Plan  · Presented due to new ulcer on his inner left foot with swelling and erythema  Saw podiatry outpatient on 8/9 who recommended he go to the ER for osteomyelitis workup  · Denies fevers or chills   · Met sepsis criteria due to tachycardia, WBC 15 25   · Lactic and procalcitonin WNL  · CRP 88 6  · Sed rate 83  · Home regimen: Glipizide 10 mg bid, Admelog bid, jardiance and metformin   · Hold oral hypoglycemics   · SSI   · A1c 6 5  · Xray: No radiographic evidence of osteomyelitis  · Continue IV cefepime and vancomycin  · MRI:Plantar skin ulceration 1st MTP with subcutaneous edema indicating cellulitis without evidence of drainable abscess collection in this unenhanced study  No evidence of T1 replacement signal to indicate osteomyelitis at this time  · Consult Podiatry   · NPO at midnight, 1/2 lantus  · I&D today 8/13    Hypertensive urgency  Assessment & Plan  · Home regimen: losartan-HCTZ 100-25 mg daily, Nifedipine 90 mg daily, propranolol 160 mg daily   · Hypertensive in the /86  · IV Hydralazine PRN   · Continue to monitor     Sepsis ([facility] )  Assessment & Plan  · Met sepsis due to tachycardia, WBC 15 25  Source of infection cellulitis of diabetic ulcer on left foot  R/O osteo   · Lactic WNL  · Procal 0 08   · IV cefepime and IV vancomycin  · BC NGTD  · Wound cx growing +1 g positive cocci in pairs with rare Gram-negative rods      Hypercholesterolemia  Assessment & Plan  · Continue statin        VTE Pharmacologic Prophylaxis: VTE Score: 3 Moderate Risk (Score 3-4) - Pharmacological DVT Prophylaxis Ordered: heparin      Patient Centered Rounds:  I performed bedside rounds with nursing staff today  Discussions with Specialists or Other Care Team Provider: Podiatry, CM, PT, OT     Education and Discussions with Family / Patient: pt       Time Spent for Care: 30 minutes  More than 50% of total time spent on counseling and coordination of care as described above      Current Length of Stay: 3 day(s)  Current Patient Status: Inpatient   Certification Statement: The patient will continue to require additional inpatient hospital stay due to DM foot ulcer  Discharge Plan: Anticipate discharge in 24-48 hrs to discharge location to be determined pending rehab evaluations      Code Status: Level 1 - Full Code     Subjective:   Isreal Monroy was seen and examined at bedside  No acute events overnight  Discussed plan of care  All questions and concerns were answered and addressed  No acute symptoms on review systems  Objective:     Vitals:   Temp (24hrs), Av 6 °F (36 4 °C), Min:97 3 °F (36 3 °C), Max:97 8 °F (36 6 °C)    Temp:  [97 3 °F (36 3 °C)-97 8 °F (36 6 °C)] 97 8 °F (36 6 °C)  HR:  [63-74] 68  Resp:  [12-22] 14  BP: (140-189)/(74-91) 163/88  SpO2:  [95 %-98 %] 95 %  Body mass index is 29 56 kg/m²  Input and Output Summary (last 24 hours): Intake/Output Summary (Last 24 hours) at 2022 0934  Last data filed at 2022 4895  Gross per 24 hour   Intake 513 33 ml   Output --   Net 513 33 ml       Physical Exam:   Physical Exam  Vitals and nursing note reviewed  Constitutional:       Appearance: Normal appearance  He is obese  HENT:      Head: Normocephalic and atraumatic  Cardiovascular:      Rate and Rhythm: Normal rate and regular rhythm  Pulses: Normal pulses  Heart sounds: Normal heart sounds  Pulmonary:      Effort: Pulmonary effort is normal       Breath sounds: Normal breath sounds  Abdominal:      General: Abdomen is flat  Bowel sounds are normal       Palpations: Abdomen is soft     Musculoskeletal:      Right lower leg: No edema  Left lower leg: No edema  Skin:     General: Skin is warm  Neurological:      General: No focal deficit present  Mental Status: He is alert and oriented to person, place, and time  Additional Data:     Labs:  Results from last 7 days   Lab Units 08/12/22  0618 08/11/22 0448 08/10/22  0456   WBC Thousand/uL 10 08   < > 12 07*   HEMOGLOBIN g/dL 12 4   < > 12 5   HEMATOCRIT % 39 1   < > 40 4   PLATELETS Thousands/uL 346   < > 364   NEUTROS PCT %  --   --  74   LYMPHS PCT %  --   --  15   MONOS PCT %  --   --  8   EOS PCT %  --   --  2    < > = values in this interval not displayed  Results from last 7 days   Lab Units 08/12/22  0618 08/10/22  0456 08/09/22  1907   SODIUM mmol/L 134*   < > 136   POTASSIUM mmol/L 4 1   < > 3 5   CHLORIDE mmol/L 99   < > 93*   CO2 mmol/L 31   < > 33*   BUN mg/dL 8   < > 7   CREATININE mg/dL 0 81   < > 0 91   ANION GAP mmol/L 4   < > 10   CALCIUM mg/dL 9 2   < > 9 5   ALBUMIN g/dL  --   --  3 8   TOTAL BILIRUBIN mg/dL  --   --  0 50   ALK PHOS U/L  --   --  107   ALT U/L  --   --  15   AST U/L  --   --  25   GLUCOSE RANDOM mg/dL 161*   < > 100    < > = values in this interval not displayed           Results from last 7 days   Lab Units 08/13/22  0735 08/12/22  2126 08/12/22  1841 08/12/22  1620 08/12/22  1117 08/12/22  0726 08/11/22  2225 08/11/22  1701 08/11/22  1048 08/11/22  0706 08/10/22  2101 08/10/22  2038   POC GLUCOSE mg/dl 204* 154* 121 66 339* 153* 126 194* 228* 176* 115 55*     Results from last 7 days   Lab Units 08/10/22  0456   HEMOGLOBIN A1C % 6 5*     Results from last 7 days   Lab Units 08/11/22  0448 08/10/22  0456 08/09/22  2041 08/09/22  2040   LACTIC ACID mmol/L  --   --   --  1 2   PROCALCITONIN ng/ml 0 07 0 08 0 09  --        Lines/Drains:  Invasive Devices  Report    Peripheral Intravenous Line  Duration           Peripheral IV 08/10/22 Right Antecubital 2 days    Peripheral IV 08/12/22 Right Wrist <1 day Imaging: No pertinent imaging reviewed  Recent Cultures (last 7 days):   Results from last 7 days   Lab Units 08/10/22  1818 08/09/22  2046 08/09/22  1900   BLOOD CULTURE   --  No Growth at 72 hrs  No Growth at 72 hrs     GRAM STAIN RESULT  4+ Polys*  1+ Gram positive cocci in pairs*  Rare Gram negative rods*  --   --    WOUND CULTURE  2+ Growth of Citrobacter koseri*  2+ Growth of   --   --        Last 24 Hours Medication List:   Current Facility-Administered Medications   Medication Dose Route Frequency Provider Last Rate    acetaminophen  650 mg Oral Q6H PRN Donel Pu, PA-C      aspirin  81 mg Oral Daily Donel Pu, PA-C      cefepime  2,000 mg Intravenous Q12H Donel Pu, PA-C 2,000 mg (08/13/22 0849)    clonazePAM  1 mg Oral BID PRN Donel Pu, PA-C      cyclobenzaprine  10 mg Oral HS PRN Donel Pu, PA-C      docusate sodium  100 mg Oral BID Donel Pu, PA-C      DULoxetine  60 mg Oral BID Donel Pu, PA-C      fluticasone  2 spray Each Nare Daily Donel Pu, PA-C      folic acid  2 mg Oral Daily Donel Pu, PA-C      heparin (porcine)  5,000 Units Subcutaneous [facility] Donel Pu, PA-C      hydrALAZINE  5 mg Intravenous Q6H PRN Donel Pu, PA-C      hydrochlorothiazide  25 mg Oral Daily Donel Pu, PA-C      insulin glargine  19 Units Subcutaneous HS Donel Pu, PA-C      insulin lispro  1-6 Units Subcutaneous TID AC Donel Pu, PA-C      insulin lispro  1-6 Units Subcutaneous HS Donel Pu, PA-C      lactated ringers  10 mL/hr Intravenous Continuous Nicole Bae CRNA Stopped (08/13/22 6266)    lidocaine  1 patch Topical HS Donel Pu, PA-C      loratadine  10 mg Oral Daily Donel Pu, PA-C      losartan  100 mg Oral Daily Donel Pu, PA-C      mirtazapine  45 mg Oral HS Donel Pu, PA-C      neomycin-polymyxin B (NEOSPORIN ) irrigation solution   Irrigation Once Cielo Doss, PA-C      NIFEdipine  120 mg Oral Daily Cielo Doss, PA-C      ondansetron  4 mg Intravenous Q6H PRN Cielo Doss, PA-C      ondansetron  4 mg Intravenous Once PRN Matthew Britt CRNA      pravastatin  20 mg Oral Daily With [employer], PA-C      propranolol  160 mg Oral Daily Belfermina Romario, PA-C      sertraline  75 mg Oral Daily Cielo Doss, PA-C      traMADol  50 mg Oral Q6H PRN Cielo Doss, PA-C      vancomycin  17 5 mg/kg Intravenous Q12H Cielo Doss, PA-C 1,750 mg (08/12/22 3804)        Today, Patient Was Seen By: Los Redd MD    **Please Note: This note may have been constructed using a voice recognition system  **

## 2022-08-13 NOTE — PROGRESS NOTES
Vancomycin Assessment    Steven Olivas is a 74 y o  male who is currently receiving vancomycin 1750 mg Q12H (vanco trough 25 3) for skin-soft tissue infection, osteomyelitis     Relevant clinical data and objective history reviewed:  Creatinine   Date Value Ref Range Status   08/13/2022 0 93 0 60 - 1 30 mg/dL Final     Comment:     Standardized to IDMS reference method   08/12/2022 0 81 0 60 - 1 30 mg/dL Final     Comment:     Standardized to IDMS reference method   08/11/2022 0 83 0 60 - 1 30 mg/dL Final     Comment:     Standardized to IDMS reference method     /88 (BP Location: Left arm)   Pulse 68   Temp 97 8 °F (36 6 °C) (Oral)   Resp 14   Ht 5' 10" (1 778 m)   Wt 93 4 kg (206 lb)   SpO2 95%   BMI 29 56 kg/m²   I/O last 3 completed shifts: In: 1380 [P O :480; I V :400; IV Piggyback:500]  Out: -   Lab Results   Component Value Date/Time    BUN 10 08/13/2022 10:37 AM    WBC 10 51 (H) 08/13/2022 10:37 AM    HGB 13 1 08/13/2022 10:37 AM    HCT 42 0 08/13/2022 10:37 AM    MCV 88 08/13/2022 10:37 AM     (H) 08/13/2022 10:37 AM     Temp Readings from Last 3 Encounters:   08/13/22 97 8 °F (36 6 °C) (Oral)   08/09/22 98 °F (36 7 °C) (Temporal)   07/29/22 97 9 °F (36 6 °C)     Vancomycin Days of Therapy: 5    Assessment/Plan  The patient is currently on vancomycin utilizing scheduled dosing  Baseline risks associated with therapy include: dehydration  The patient is receiving 1750 mg Q12H (vanco trough 25 3) with the most recent vancomycin level being at steady-state and supratherapeutic based on a goal of 15-20 (appropriate for most indications); therefore, after clinical evaluation will be changed to 1250 mg Q12H start 0330 hrs 8/14   Pharmacy will continue to follow closely for s/sx of nephrotoxicity, infusion reactions, and appropriateness of therapy  BMP and CBC will be ordered per protocol    Plan for trough as patient approaches steady state, prior to the 4th  dose at approximately 1500 hrs on 8/14/22  Pharmacy will continue to follow the patients culture results and clinical progress daily      Mary Sanchez, Pharmacist

## 2022-08-13 NOTE — PLAN OF CARE
Problem: Potential for Falls  Goal: Patient will remain free of falls  Description: INTERVENTIONS:  - Educate patient/family on patient safety including physical limitations  - Instruct patient to call for assistance with activity   - Consult OT/PT to assist with strengthening/mobility   - Keep Call bell within reach  - Keep bed low and locked with side rails adjusted as appropriate  - Keep care items and personal belongings within reach  - Initiate and maintain comfort rounds  - Make Fall Risk Sign visible to staff  - Offer Toileting every 2 Hours, in advance of need  - Initiate/Maintain bed alarm  - Obtain necessary fall risk management equipment:   - Apply yellow socks and bracelet for high fall risk patients  - Consider moving patient to room near nurses station  Outcome: Progressing     Problem: METABOLIC, FLUID AND ELECTROLYTES - ADULT  Goal: Electrolytes maintained within normal limits  Description: INTERVENTIONS:  - Monitor labs and assess patient for signs and symptoms of electrolyte imbalances  - Administer electrolyte replacement as ordered  - Monitor response to electrolyte replacements, including repeat lab results as appropriate  - Instruct patient on fluid and nutrition as appropriate  Outcome: Progressing     Problem: SKIN/TISSUE INTEGRITY - ADULT  Goal: Skin Integrity remains intact(Skin Breakdown Prevention)  Description: Assess:  -Perform Sanjay assessment  -Clean and moisturize skin  -Inspect skin when repositioning, toileting, and assisting with ADLS  -Assess under medical devices  -Assess extremities for adequate circulation and sensation     Bed Management:  -Have minimal linens on bed & keep smooth, unwrinkled  -Change linens as needed when moist or perspiring  -Avoid sitting or lying in one position for more than 2 hours while in bed  -Keep HOB at 30 degrees     Toileting:  -Offer bedside commode  -Assess for incontinence  -Use incontinent care products after each incontinent episode    Activity:  -Mobilize patient 2 times a day  -Encourage activity and walks on unit  -Encourage or provide ROM exercises   -Turn and reposition patient every 2 Hours  -Use appropriate equipment to lift or move patient in bed  -Instruct/ Assist with weight shifting when out of bed in chair  -Consider limitation of chair time 2 hour intervals    Skin Care:  -Avoid use of baby powder, tape, friction and shearing, hot water or constrictive clothing  -Relieve pressure over bony prominences  -Do not massage red bony areas    Next Steps:  -Teach patient strategies to minimize risks   -Consider consults to  interdisciplinary teams   Outcome: Progressing  Goal: Incision(s), wounds(s) or drain site(s) healing without S/S of infection  Description: INTERVENTIONS  - Assess and document dressing, incision, wound bed, drain sites and surrounding tissue  - Provide patient and family education  - Perform skin care/dressing changes  Outcome: Progressing     Problem: HEMATOLOGIC - ADULT  Goal: Maintains hematologic stability  Description: INTERVENTIONS  - Assess for signs and symptoms of bleeding or hemorrhage  - Monitor labs  - Administer supportive blood products/factors as ordered and appropriate  Outcome: Progressing     Problem: MUSCULOSKELETAL - ADULT  Goal: Maintain or return mobility to safest level of function  Description: INTERVENTIONS:  - Assess patient's ability to carry out ADLs; assess patient's baseline for ADL function and identify physical deficits which impact ability to perform ADLs (bathing, care of mouth/teeth, toileting, grooming, dressing, etc )  - Assess/evaluate cause of self-care deficits   - Assess range of motion  - Assess patient's mobility  - Assess patient's need for assistive devices and provide as appropriate  - Encourage maximum independence but intervene and supervise when necessary  - Involve family in performance of ADLs  - Assess for home care needs following discharge   - Consider OT consult to assist with ADL evaluation and planning for discharge  - Provide patient education as appropriate  Outcome: Progressing     Problem: PAIN - ADULT  Goal: Verbalizes/displays adequate comfort level or baseline comfort level  Description: Interventions:  - Encourage patient to monitor pain and request assistance  - Assess pain using appropriate pain scale  - Administer analgesics based on type and severity of pain and evaluate response  - Implement non-pharmacological measures as appropriate and evaluate response  - Consider cultural and social influences on pain and pain management  - Notify physician/advanced practitioner if interventions unsuccessful or patient reports new pain  Outcome: Progressing     Problem: INFECTION - ADULT  Goal: Absence or prevention of progression during hospitalization  Description: INTERVENTIONS:  - Assess and monitor for signs and symptoms of infection  - Monitor lab/diagnostic results  - Monitor all insertion sites, i e  indwelling lines, tubes, and drains  - Monitor endotracheal if appropriate and nasal secretions for changes in amount and color  - [place] appropriate cooling/warming therapies per order  - Administer medications as ordered  - Instruct and encourage patient and family to use good hand hygiene technique  - Identify and instruct in appropriate isolation precautions for identified infection/condition  Outcome: Progressing     Problem: SAFETY ADULT  Goal: Patient will remain free of falls  Description: INTERVENTIONS:  - Educate patient/family on patient safety including physical limitations  - Instruct patient to call for assistance with activity   - Consult OT/PT to assist with strengthening/mobility   - Keep Call bell within reach  - Keep bed low and locked with side rails adjusted as appropriate  - Keep care items and personal belongings within reach  - Initiate and maintain comfort rounds  - Make Fall Risk Sign visible to staff  - Offer Toileting every 2 Hours, in advance of need  - Initiate/Maintain bed alarm  - Obtain necessary fall risk management equipment:   - Apply yellow socks and bracelet for high fall risk patients  - Consider moving patient to room near nurses station  Outcome: Progressing     Problem: DISCHARGE PLANNING  Goal: Discharge to home or other facility with appropriate resources  Description: INTERVENTIONS:  - Identify barriers to discharge w/patient and caregiver  - Arrange for needed discharge resources and transportation as appropriate  - Identify discharge learning needs (meds, wound care, etc )  - Arrange for interpretive services to assist at discharge as needed  - Refer to Case Management Department for coordinating discharge planning if the patient needs post-hospital services based on physician/advanced practitioner order or complex needs related to functional status, cognitive ability, or social support system  Outcome: Progressing     Problem: Knowledge Deficit  Goal: Patient/family/caregiver demonstrates understanding of disease process, treatment plan, medications, and discharge instructions  Description: Complete learning assessment and assess knowledge base  Interventions:  - Provide teaching at level of understanding  - Provide teaching via preferred learning methods  Outcome: Progressing     Problem: Nutrition/Hydration-ADULT  Goal: Nutrient/Hydration intake appropriate for improving, restoring or maintaining nutritional needs  Description: Monitor and assess patient's nutrition/hydration status for malnutrition  Collaborate with interdisciplinary team and initiate plan and interventions as ordered  Monitor patient's weight and dietary intake as ordered or per policy  Utilize nutrition screening tool and intervene as necessary  Determine patient's food preferences and provide high-protein, high-caloric foods as appropriate       INTERVENTIONS:  - Monitor oral intake, urinary output, labs, and treatment plans  - Assess nutrition and hydration status and recommend course of action  - Evaluate amount of meals eaten  - Assist patient with eating if necessary   - Allow adequate time for meals  - Recommend/ encourage appropriate diets, oral nutritional supplements, and vitamin/mineral supplements  - Order, calculate, and assess calorie counts as needed  - Recommend, monitor, and adjust tube feedings and TPN/PPN based on assessed needs  - Assess need for intravenous fluids  - Provide specific nutrition/hydration education as appropriate  - Include patient/family/caregiver in decisions related to nutrition  Outcome: Progressing     Problem: MOBILITY - ADULT  Goal: Maintain or return to baseline ADL function  Description: INTERVENTIONS:  -  Assess patient's ability to carry out ADLs; assess patient's baseline for ADL function and identify physical deficits which impact ability to perform ADLs (bathing, care of mouth/teeth, toileting, grooming, dressing, etc )  - Assess/evaluate cause of self-care deficits   - Assess range of motion  - Assess patient's mobility; develop plan if impaired  - Assess patient's need for assistive devices and provide as appropriate  - Encourage maximum independence but intervene and supervise when necessary  - Involve family in performance of ADLs  - Assess for home care needs following discharge   - Consider OT consult to assist with ADL evaluation and planning for discharge  - Provide patient education as appropriate  Outcome: Progressing  Goal: Maintains/Returns to pre admission functional level  Description: INTERVENTIONS:  - Perform BMAT or MOVE assessment daily    - Set and communicate daily mobility goal to care team and patient/family/caregiver  - Collaborate with rehabilitation services on mobility goals if consulted  - Perform Range of Motion 5 times a day  - Reposition patient every 2 hours    - Dangle patient 3 times a day  - Stand patient 3 times a day  - Ambulate patient 3 times a day  - Out of bed to chair 3 times a day   - Out of bed for meals 3 times a day  - Out of bed for toileting  - Record patient progress and toleration of activity level   Outcome: Progressing

## 2022-08-13 NOTE — PROGRESS NOTES
Progress Note - Podiatry  Pamela Thomas 74 y o  male MRN: [id number]  Unit/Bed#: -01 Encounter: [id number]    Assessment/Plan:  Cellulitis/abscess left foot/sepsis  · Continue with current IV antibiotics  · X-rays show no destructive changes suggesting osteomyelitis  · Elevated CRP and ESR 83 remain suspicious for underlying bone infection  · MRI  shows no underlying osteomyelitis  · WBC 15 25 upon admission, 10 51 today which is trending upward since surgical I&D on Friday evening, despite clinical presentation reflecting resolution of infection with diminished/resolving erythema edema and calor      Diabetic Skin ulceration left forefoot with fat layer exposed  · POD #1 S/P I&D LFT ulceration with abscess  · Packing removed, wound irrigated with 200 cc NSS  · Dry sterile dressing with silver alginate applied left foot  · Wound culture obtained 8/10/2022 grew Citrobacter koseri  · OR cultures aerobic and anaerobic still pending     DM2 with neuropathy, Hypertensive urgency, hypercholesterolemia  · Managed per Internal Medicine  · Recommend patient continue with regular diabetic foot care  · Will require new diabetic shoes in the near future        Subjective/Objective   Chief Complaint:   Chief Complaint   Patient presents with    Wound Infection     Pt reports being diabetic and two days ago noticed swelling and redness on a boil on his inner left foot  Denies fevers  Subjective:  22-year-old white male seen resting comfortably in bed  No acute pedal concerns  Reports no significant pain from his left foot overnight  Denies any shortness of breath  Blood pressure 163/88, pulse 68, temperature 97 8 °F (36 6 °C), temperature source Oral, resp  rate 14, height 5' 10" (1 778 m), weight 93 4 kg (206 lb), SpO2 95 %  ,Body mass index is 29 56 kg/m²      Invasive Devices  Report    Peripheral Intravenous Line  Duration           Peripheral IV 08/10/22 Right Antecubital 2 days    Peripheral IV 08/12/22 Right Wrist <1 day                Physical Exam:   General appearance: alert, cooperative and no distress  Neuro/Vascular: Normal strength  Sensation and reflexes:  Diminished epicritic sensations with loss of protective sensation consistent with advanced diabetic neuropathy  Pulses: Right: DP 1/4, PT 0/4, Left: DP 1/4, PT 0/4  Capillary Filling:  3 Sec, Edema:  +1 left forefoot  Ulcers/Wounds:  Left foot sub 1st MPJ full-thickness ulcerative breakdown S/P I&D/Debridement  Diminished erythema, calor, and edema noted  After removal packing wound bed appears pink and healthy with bleeding edges  No active necrosis noted  No purulent drainage noted, only mild serosanguineous drainage within bandage noted  Labs and other studies:   CBC w/diff  Results from last 7 days   Lab Units 08/13/22  1037 08/11/22  0448 08/10/22  0456   WBC Thousand/uL 10 51*   < > 12 07*   HEMOGLOBIN g/dL 13 1   < > 12 5   HEMATOCRIT % 42 0   < > 40 4   PLATELETS Thousands/uL 432*   < > 364   NEUTROS PCT %  --   --  74   LYMPHS PCT %  --   --  15   MONOS PCT %  --   --  8   EOS PCT %  --   --  2    < > = values in this interval not displayed  BMP  Results from last 7 days   Lab Units 08/13/22  1037   POTASSIUM mmol/L 4 6   CHLORIDE mmol/L 97   CO2 mmol/L 31   BUN mg/dL 10   CREATININE mg/dL 0 93   CALCIUM mg/dL 9 5     CMP  Results from last 7 days   Lab Units 08/13/22  1037 08/10/22  0456 08/09/22  1907   POTASSIUM mmol/L 4 6   < > 3 5   CHLORIDE mmol/L 97   < > 93*   CO2 mmol/L 31   < > 33*   BUN mg/dL 10   < > 7   CREATININE mg/dL 0 93   < > 0 91   CALCIUM mg/dL 9 5   < > 9 5   ALK PHOS U/L  --   --  107   ALT U/L  --   --  15   AST U/L  --   --  25    < > = values in this interval not displayed         @Culture@  Lab Results   Component Value Date    BLOODCX No Growth at 72 hrs  08/09/2022    BLOODCX No Growth at 72 hrs  08/09/2022     Lab Results   Component Value Date    WOUNDCULT 2+ Growth of Citrobacter koseri (A) 08/10/2022    WOUNDCULT 2+ Growth of  08/10/2022         Current Facility-Administered Medications:     acetaminophen (TYLENOL) tablet 650 mg, 650 mg, Oral, Q6H PRN, Andres ABDIRASHID Cee-C, 650 mg at 08/12/22 0404    aspirin (ECOTRIN LOW STRENGTH) EC tablet 81 mg, 81 mg, Oral, Daily, Andres ABDIRASHID Cee-C, 81 mg at 08/13/22 0848    cefepime (MAXIPIME) IVPB (premix in dextrose) 2,000 mg 50 mL, 2,000 mg, Intravenous, Q12H, Andres ABDIRASHID Cee-DEANNA, Last Rate: 100 mL/hr at 08/13/22 0849, 2,000 mg at 08/13/22 0849    clonazePAM (KlonoPIN) tablet 1 mg, 1 mg, Oral, BID PRN, Andres ABDIRASHID Cee-C, 1 mg at 08/10/22 1040    cyclobenzaprine (FLEXERIL) tablet 10 mg, 10 mg, Oral, HS PRN, Andres ABDIRASHID Cee-C, 10 mg at 08/12/22 0234    docusate sodium (COLACE) capsule 100 mg, 100 mg, Oral, BID, Andres ABDIRASHID Cee-C, 100 mg at 08/13/22 5633    DULoxetine (CYMBALTA) delayed release capsule 60 mg, 60 mg, Oral, BID, Andres ABDIRASHID Cee-C, 60 mg at 08/13/22 0848    fluticasone (FLONASE) 50 mcg/act nasal spray 2 spray, 2 spray, Each Nare, Daily, LupilloABDIRASHID Figueroa-C, 2 spray at 07/75/63 2306    folic acid (FOLVITE) tablet 2 mg, 2 mg, Oral, Daily, LupilloABDIRASHID Figueroa-C, 2 mg at 08/13/22 0849    heparin (porcine) subcutaneous injection 5,000 Units, 5,000 Units, Subcutaneous, Q8H [facility], 5,000 Units at 08/13/22 0517 **AND** [CANCELED] Platelet count, , , Once, Dotty DANIEL Hernández    hydrALAZINE (APRESOLINE) injection 5 mg, 5 mg, Intravenous, Q6H PRN, Andres Cee PA-C    hydrochlorothiazide (HYDRODIURIL) tablet 25 mg, 25 mg, Oral, Daily, Andres Cee PA-C, 25 mg at 08/13/22 0848    insulin glargine (LANTUS) subcutaneous injection 19 Units 0 19 mL, 19 Units, Subcutaneous, HS, Andres Cee PA-C, 19 Units at 08/12/22 2254    insulin lispro (HumaLOG) 100 units/mL subcutaneous injection 1-6 Units, 1-6 Units, Subcutaneous, TID AC, 4 Units at 08/13/22 1045 **AND** Fingerstick Glucose (POCT), , , TID AC, Kateryna Murray PA-C    insulin lispro (HumaLOG) 100 units/mL subcutaneous injection 1-6 Units, 1-6 Units, Subcutaneous, HS, Kateryna Murray PA-C, 1 Units at 08/12/22 2254    lactated ringers infusion, 10 mL/hr, Intravenous, Continuous, Randee Angelo CRNA, Stopped at 08/13/22 0718    lidocaine (LIDODERM) 5 % patch 1 patch, 1 patch, Topical, HS, Kateryna Murray PA-C, 1 patch at 08/12/22 2252    loratadine (CLARITIN) tablet 10 mg, 10 mg, Oral, Daily, Kateryna Murray PA-C, 10 mg at 08/13/22 0848    losartan (COZAAR) tablet 100 mg, 100 mg, Oral, Daily, Kateryna Murray PA-C, 100 mg at 08/13/22 0848    mirtazapine (REMERON) tablet 45 mg, 45 mg, Oral, HS, Kateryna Murray PA-C, 45 mg at 08/12/22 2247    neomycin-polymyxin B (NEOSPORIN-) 1 mL in sodium chloride 0 9 % 1,000 mL irrigation, , Irrigation, Once, Kateryna Murray PA-C    NIFEdipine (PROCARDIA XL) 24 hr tablet 120 mg, 120 mg, Oral, Daily, Kateryna Murray PA-C, 120 mg at 08/13/22 0848    ondansetron (ZOFRAN) injection 4 mg, 4 mg, Intravenous, Q6H PRN, Kateryna Murray PA-C    ondansetron [facility]) injection 4 mg, 4 mg, Intravenous, Once PRN, Anna Lozano CRNA    oxyCODONE (ROXICODONE) IR tablet 5 mg, 5 mg, Oral, Q6H PRN, Yosef Bond MD    polyethylene glycol (MIRALAX) packet 17 g, 17 g, Oral, Daily PRN, Yosef Bond MD, 17 g at 08/13/22 1055    pravastatin (PRAVACHOL) tablet 20 mg, 20 mg, Oral, Daily With Dinner, Kateryna Murray PA-C, 20 mg at 08/12/22 1926    propranolol (INDERAL LA) 24 hr capsule 160 mg, 160 mg, Oral, Daily, Kateryna Murray PA-C, 160 mg at 08/13/22 0849    sertraline (ZOLOFT) tablet 75 mg, 75 mg, Oral, Daily, Kateryna Murray PA-C, 75 mg at 08/13/22 0848    traMADol (ULTRAM) tablet 50 mg, 50 mg, Oral, Q6H PRN, Kateryna Murray PA-C, 50 mg at 08/13/22 0519    [START ON 8/14/2022] vancomycin (VANCOCIN) 1,250 mg in sodium chloride 0 9 % 250 mL IVPB, 1,250 mg, Intravenous, Mohamud Restrepo MD    Imaging: I have personally reviewed pertinent films in PACS  EKG, Pathology, and Other Studies: I have personally reviewed pertinent reports    VTE Pharmacologic Prophylaxis: Heparin  VTE Mechanical Prophylaxis: sequential compression device    Taty Oglesby DPM

## 2022-08-13 NOTE — ASSESSMENT & PLAN NOTE
· Presented due to new ulcer on his inner left foot with swelling and erythema  Saw podiatry outpatient on 8/9 who recommended he go to the ER for osteomyelitis workup  · Denies fevers or chills   · Met sepsis criteria due to tachycardia, WBC 15 25   · Lactic and procalcitonin WNL  · CRP 88 6  · Sed rate 83  · Home regimen: Glipizide 10 mg bid, Admelog bid, jardiance and metformin   · Hold oral hypoglycemics   · SSI   · A1c 6 5  · Xray: No radiographic evidence of osteomyelitis  · Continue IV cefepime and vancomycin  · MRI:Plantar skin ulceration 1st MTP with subcutaneous edema indicating cellulitis without evidence of drainable abscess collection in this unenhanced study  No evidence of T1 replacement signal to indicate osteomyelitis at this time     · Consult Podiatry   · NPO at midnight, 1/2 lantus  · I&D today 8/13

## 2022-08-14 ENCOUNTER — APPOINTMENT (INPATIENT)
Dept: CT IMAGING | Facility: HOSPITAL | Age: 67
DRG: 854 | End: 2022-08-14
Payer: MEDICARE

## 2022-08-14 LAB
ANION GAP SERPL CALCULATED.3IONS-SCNC: 7 MMOL/L (ref 4–13)
BUN SERPL-MCNC: 10 MG/DL (ref 5–25)
CALCIUM SERPL-MCNC: 9.9 MG/DL (ref 8.3–10.1)
CHLORIDE SERPL-SCNC: 96 MMOL/L (ref 96–108)
CO2 SERPL-SCNC: 33 MMOL/L (ref 21–32)
CREAT SERPL-MCNC: 0.8 MG/DL (ref 0.6–1.3)
ERYTHROCYTE [DISTWIDTH] IN BLOOD BY AUTOMATED COUNT: 14.6 % (ref 11.6–15.1)
GFR SERPL CREATININE-BSD FRML MDRD: 93 ML/MIN/1.73SQ M
GLUCOSE SERPL-MCNC: 135 MG/DL (ref 65–140)
GLUCOSE SERPL-MCNC: 165 MG/DL (ref 65–140)
GLUCOSE SERPL-MCNC: 166 MG/DL (ref 65–140)
GLUCOSE SERPL-MCNC: 211 MG/DL (ref 65–140)
GLUCOSE SERPL-MCNC: 225 MG/DL (ref 65–140)
HCT VFR BLD AUTO: 43.9 % (ref 36.5–49.3)
HGB BLD-MCNC: 13.9 G/DL (ref 12–17)
MAGNESIUM SERPL-MCNC: 1.5 MG/DL (ref 1.6–2.6)
MCH RBC QN AUTO: 27.6 PG (ref 26.8–34.3)
MCHC RBC AUTO-ENTMCNC: 31.7 G/DL (ref 31.4–37.4)
MCV RBC AUTO: 87 FL (ref 82–98)
PLATELET # BLD AUTO: 447 THOUSANDS/UL (ref 149–390)
PMV BLD AUTO: 9.7 FL (ref 8.9–12.7)
POTASSIUM SERPL-SCNC: 3.7 MMOL/L (ref 3.5–5.3)
RBC # BLD AUTO: 5.04 MILLION/UL (ref 3.88–5.62)
SODIUM SERPL-SCNC: 136 MMOL/L (ref 135–147)
WBC # BLD AUTO: 10.78 THOUSAND/UL (ref 4.31–10.16)

## 2022-08-14 PROCEDURE — G1004 CDSM NDSC: HCPCS

## 2022-08-14 PROCEDURE — 85027 COMPLETE CBC AUTOMATED: CPT | Performed by: STUDENT IN AN ORGANIZED HEALTH CARE EDUCATION/TRAINING PROGRAM

## 2022-08-14 PROCEDURE — 83735 ASSAY OF MAGNESIUM: CPT | Performed by: STUDENT IN AN ORGANIZED HEALTH CARE EDUCATION/TRAINING PROGRAM

## 2022-08-14 PROCEDURE — 99233 SBSQ HOSP IP/OBS HIGH 50: CPT | Performed by: STUDENT IN AN ORGANIZED HEALTH CARE EDUCATION/TRAINING PROGRAM

## 2022-08-14 PROCEDURE — 80048 BASIC METABOLIC PNL TOTAL CA: CPT | Performed by: STUDENT IN AN ORGANIZED HEALTH CARE EDUCATION/TRAINING PROGRAM

## 2022-08-14 PROCEDURE — 72132 CT LUMBAR SPINE W/DYE: CPT

## 2022-08-14 PROCEDURE — 82948 REAGENT STRIP/BLOOD GLUCOSE: CPT

## 2022-08-14 RX ORDER — INSULIN GLARGINE 100 [IU]/ML
37 INJECTION, SOLUTION SUBCUTANEOUS
Status: DISCONTINUED | OUTPATIENT
Start: 2022-08-14 | End: 2022-08-15 | Stop reason: HOSPADM

## 2022-08-14 RX ORDER — HYDROCHLOROTHIAZIDE 25 MG/1
50 TABLET ORAL DAILY
Status: DISCONTINUED | OUTPATIENT
Start: 2022-08-15 | End: 2022-08-15 | Stop reason: HOSPADM

## 2022-08-14 RX ORDER — LORAZEPAM 2 MG/ML
1 INJECTION INTRAMUSCULAR EVERY 6 HOURS PRN
Status: DISCONTINUED | OUTPATIENT
Start: 2022-08-14 | End: 2022-08-15 | Stop reason: HOSPADM

## 2022-08-14 RX ORDER — LABETALOL HYDROCHLORIDE 5 MG/ML
10 INJECTION, SOLUTION INTRAVENOUS EVERY 6 HOURS PRN
Status: DISCONTINUED | OUTPATIENT
Start: 2022-08-14 | End: 2022-08-15 | Stop reason: HOSPADM

## 2022-08-14 RX ADMIN — HEPARIN SODIUM 5000 UNITS: 5000 INJECTION INTRAVENOUS; SUBCUTANEOUS at 12:30

## 2022-08-14 RX ADMIN — LABETALOL HYDROCHLORIDE 10 MG: 5 INJECTION, SOLUTION INTRAVENOUS at 10:02

## 2022-08-14 RX ADMIN — HYDROCHLOROTHIAZIDE 25 MG: 25 TABLET ORAL at 07:30

## 2022-08-14 RX ADMIN — POLYETHYLENE GLYCOL 3350 17 G: 17 POWDER, FOR SOLUTION ORAL at 09:03

## 2022-08-14 RX ADMIN — DULOXETINE 60 MG: 30 CAPSULE, DELAYED RELEASE ORAL at 20:47

## 2022-08-14 RX ADMIN — SERTRALINE HYDROCHLORIDE 75 MG: 25 TABLET ORAL at 09:03

## 2022-08-14 RX ADMIN — CEFEPIME HYDROCHLORIDE 2000 MG: 2 INJECTION, SOLUTION INTRAVENOUS at 09:03

## 2022-08-14 RX ADMIN — CLONAZEPAM 1 MG: 1 TABLET ORAL at 20:56

## 2022-08-14 RX ADMIN — LOSARTAN POTASSIUM 100 MG: 50 TABLET, FILM COATED ORAL at 07:30

## 2022-08-14 RX ADMIN — CLONAZEPAM 1 MG: 1 TABLET ORAL at 09:06

## 2022-08-14 RX ADMIN — VANCOMYCIN HYDROCHLORIDE 1250 MG: 5 INJECTION, POWDER, LYOPHILIZED, FOR SOLUTION INTRAVENOUS at 04:28

## 2022-08-14 RX ADMIN — INSULIN LISPRO 2 UNITS: 100 INJECTION, SOLUTION INTRAVENOUS; SUBCUTANEOUS at 12:27

## 2022-08-14 RX ADMIN — LORATADINE 10 MG: 10 TABLET ORAL at 09:02

## 2022-08-14 RX ADMIN — ONDANSETRON 4 MG: 2 INJECTION INTRAMUSCULAR; INTRAVENOUS at 06:18

## 2022-08-14 RX ADMIN — DULOXETINE 60 MG: 30 CAPSULE, DELAYED RELEASE ORAL at 09:03

## 2022-08-14 RX ADMIN — PROPRANOLOL HYDROCHLORIDE 160 MG: 80 CAPSULE, EXTENDED RELEASE ORAL at 07:30

## 2022-08-14 RX ADMIN — OXYCODONE HYDROCHLORIDE 5 MG: 5 TABLET ORAL at 09:02

## 2022-08-14 RX ADMIN — PRAVASTATIN SODIUM 20 MG: 20 TABLET ORAL at 17:16

## 2022-08-14 RX ADMIN — HEPARIN SODIUM 5000 UNITS: 5000 INJECTION INTRAVENOUS; SUBCUTANEOUS at 04:28

## 2022-08-14 RX ADMIN — LORAZEPAM 1 MG: 2 INJECTION INTRAMUSCULAR; INTRAVENOUS at 10:02

## 2022-08-14 RX ADMIN — DOCUSATE SODIUM 100 MG: 100 CAPSULE, LIQUID FILLED ORAL at 09:02

## 2022-08-14 RX ADMIN — ASPIRIN 81 MG: 81 TABLET, COATED ORAL at 09:02

## 2022-08-14 RX ADMIN — NIFEDIPINE 120 MG: 30 TABLET, FILM COATED, EXTENDED RELEASE ORAL at 07:30

## 2022-08-14 RX ADMIN — DOCUSATE SODIUM 100 MG: 100 CAPSULE, LIQUID FILLED ORAL at 17:16

## 2022-08-14 RX ADMIN — INSULIN LISPRO 1 UNITS: 100 INJECTION, SOLUTION INTRAVENOUS; SUBCUTANEOUS at 07:32

## 2022-08-14 RX ADMIN — HEPARIN SODIUM 5000 UNITS: 5000 INJECTION INTRAVENOUS; SUBCUTANEOUS at 20:54

## 2022-08-14 RX ADMIN — CEFEPIME HYDROCHLORIDE 2000 MG: 2 INJECTION, SOLUTION INTRAVENOUS at 20:47

## 2022-08-14 RX ADMIN — HYDRALAZINE HYDROCHLORIDE 5 MG: 20 INJECTION, SOLUTION INTRAMUSCULAR; INTRAVENOUS at 08:59

## 2022-08-14 RX ADMIN — MIRTAZAPINE 45 MG: 15 TABLET, FILM COATED ORAL at 22:00

## 2022-08-14 RX ADMIN — LIDOCAINE 5% 1 PATCH: 700 PATCH TOPICAL at 22:00

## 2022-08-14 RX ADMIN — FOLIC ACID 2 MG: 1 TABLET ORAL at 09:03

## 2022-08-14 RX ADMIN — INSULIN LISPRO 2 UNITS: 100 INJECTION, SOLUTION INTRAVENOUS; SUBCUTANEOUS at 17:16

## 2022-08-14 RX ADMIN — FLUTICASONE PROPIONATE 2 SPRAY: 50 SPRAY, METERED NASAL at 09:02

## 2022-08-14 RX ADMIN — VANCOMYCIN HYDROCHLORIDE 1250 MG: 5 INJECTION, POWDER, LYOPHILIZED, FOR SOLUTION INTRAVENOUS at 17:21

## 2022-08-14 RX ADMIN — HYDRALAZINE HYDROCHLORIDE 5 MG: 20 INJECTION, SOLUTION INTRAMUSCULAR; INTRAVENOUS at 03:31

## 2022-08-14 RX ADMIN — IOHEXOL 65 ML: 350 INJECTION, SOLUTION INTRAVENOUS at 13:49

## 2022-08-14 RX ADMIN — TRAMADOL HYDROCHLORIDE 50 MG: 50 TABLET, COATED ORAL at 04:35

## 2022-08-14 NOTE — PROGRESS NOTES
Vancomycin IV Pharmacy-to-Dose Consultation    Gwendolyn Raza is a 74 y o  male who is currently receiving Vancomycin IV with management by the Pharmacy Consult service  Assessment/Plan:  The patient was reviewed  Renal function is stable and no signs or symptoms of nephrotoxicity and/or infusion reactions were documented in the chart  Based on todays assessment, continue current vancomycin (day # 6) dosing of 1250mg every 12 hours, with a plan for trough to be drawn at 1500 on 8/15/22  Currently, wound culture is growing GNR- citrobacter koseri, sensitive to ancef, augmentin, FQ, or bactrim  Possible de-escalation to IV ancef or oral augmentin, FQ or bactrim if necessary  We will continue to follow the patients culture results and clinical progress daily      Zaheer Sanchez, Pharmacist, PharmD, BCPS

## 2022-08-14 NOTE — ASSESSMENT & PLAN NOTE
· Home regimen: losartan-HCTZ 100-25 mg daily, Nifedipine 90 mg daily, propranolol 160 mg daily   · HTN today  · Increased HCTZ to 50  · IV Hydralazine PRN   · Continue to monitor

## 2022-08-14 NOTE — ASSESSMENT & PLAN NOTE
· Met sepsis due to tachycardia, WBC 15 25  Source of infection cellulitis of diabetic ulcer on left foot  R/O osteo   · Lactic WNL     · Procal 0 08   · IV cefepime and IV vancomycin  · BC NGTD  Wound cx growing 2+ Growth of Citrobacter koseri,    4+ Polys Abnormal     1+ Gram positive cocci in pairs     · cx from procedure pending

## 2022-08-14 NOTE — ASSESSMENT & PLAN NOTE
· Presented due to new ulcer on his inner left foot with swelling and erythema  Saw podiatry outpatient on 8/9 who recommended he go to the ER for osteomyelitis workup  · Denies fevers or chills   · Met sepsis criteria due to tachycardia, WBC 15 25   · Lactic and procalcitonin WNL  · CRP 88 6  · Sed rate 83  · Home regimen: Glipizide 10 mg bid, Admelog bid, jardiance and metformin   · Hold oral hypoglycemics   · SSI   · A1c 6 5  · Xray: No radiographic evidence of osteomyelitis  · Continue IV cefepime and vancomycin  · MRI:Plantar skin ulceration 1st MTP with subcutaneous edema indicating cellulitis without evidence of drainable abscess collection in this unenhanced study  No evidence of T1 replacement signal to indicate osteomyelitis at this time     · Consult Podiatry   · I&D 8/12  · Cultures pending

## 2022-08-14 NOTE — PLAN OF CARE
Problem: Potential for Falls  Goal: Patient will remain free of falls  Description: INTERVENTIONS:  - Educate patient/family on patient safety including physical limitations  - Instruct patient to call for assistance with activity   - Consult OT/PT to assist with strengthening/mobility   - Keep Call bell within reach  - Keep bed low and locked with side rails adjusted as appropriate  - Keep care items and personal belongings within reach  - Initiate and maintain comfort rounds  - Make Fall Risk Sign visible to staff  - Offer Toileting every 2 Hours, in advance of need  - Initiate/Maintain bed alarm  - Obtain necessary fall risk management equipment:   - Apply yellow socks and bracelet for high fall risk patients  - Consider moving patient to room near nurses station  Outcome: Progressing     Problem: METABOLIC, FLUID AND ELECTROLYTES - ADULT  Goal: Electrolytes maintained within normal limits  Description: INTERVENTIONS:  - Monitor labs and assess patient for signs and symptoms of electrolyte imbalances  - Administer electrolyte replacement as ordered  - Monitor response to electrolyte replacements, including repeat lab results as appropriate  - Instruct patient on fluid and nutrition as appropriate  Outcome: Progressing     Problem: SKIN/TISSUE INTEGRITY - ADULT  Goal: Skin Integrity remains intact(Skin Breakdown Prevention)  Description: Assess:  -Perform Sanjay assessment  -Clean and moisturize skin  -Inspect skin when repositioning, toileting, and assisting with ADLS  -Assess under medical devices  -Assess extremities for adequate circulation and sensation     Bed Management:  -Have minimal linens on bed & keep smooth, unwrinkled  -Change linens as needed when moist or perspiring  -Avoid sitting or lying in one position for more than 2 hours while in bed  -Keep HOB at 30 degrees     Toileting:  -Offer bedside commode  -Assess for incontinence  -Use incontinent care products after each incontinent episode    Activity:  -Mobilize patient 2 times a day  -Encourage activity and walks on unit  -Encourage or provide ROM exercises   -Turn and reposition patient every 2 Hours  -Use appropriate equipment to lift or move patient in bed  -Instruct/ Assist with weight shifting when out of bed in chair  -Consider limitation of chair time 2 hour intervals    Skin Care:  -Avoid use of baby powder, tape, friction and shearing, hot water or constrictive clothing  -Relieve pressure over bony prominences  -Do not massage red bony areas    Next Steps:  -Teach patient strategies to minimize risks   -Consider consults to  interdisciplinary teams   Outcome: Progressing  Goal: Incision(s), wounds(s) or drain site(s) healing without S/S of infection  Description: INTERVENTIONS  - Assess and document dressing, incision, wound bed, drain sites and surrounding tissue  - Provide patient and family education  - Perform skin care/dressing changes  Outcome: Progressing     Problem: HEMATOLOGIC - ADULT  Goal: Maintains hematologic stability  Description: INTERVENTIONS  - Assess for signs and symptoms of bleeding or hemorrhage  - Monitor labs  - Administer supportive blood products/factors as ordered and appropriate  Outcome: Progressing     Problem: MUSCULOSKELETAL - ADULT  Goal: Maintain or return mobility to safest level of function  Description: INTERVENTIONS:  - Assess patient's ability to carry out ADLs; assess patient's baseline for ADL function and identify physical deficits which impact ability to perform ADLs (bathing, care of mouth/teeth, toileting, grooming, dressing, etc )  - Assess/evaluate cause of self-care deficits   - Assess range of motion  - Assess patient's mobility  - Assess patient's need for assistive devices and provide as appropriate  - Encourage maximum independence but intervene and supervise when necessary  - Involve family in performance of ADLs  - Assess for home care needs following discharge   - Consider OT consult to assist with ADL evaluation and planning for discharge  - Provide patient education as appropriate  Outcome: Progressing     Problem: PAIN - ADULT  Goal: Verbalizes/displays adequate comfort level or baseline comfort level  Description: Interventions:  - Encourage patient to monitor pain and request assistance  - Assess pain using appropriate pain scale  - Administer analgesics based on type and severity of pain and evaluate response  - Implement non-pharmacological measures as appropriate and evaluate response  - Consider cultural and social influences on pain and pain management  - Notify physician/advanced practitioner if interventions unsuccessful or patient reports new pain  Outcome: Progressing     Problem: INFECTION - ADULT  Goal: Absence or prevention of progression during hospitalization  Description: INTERVENTIONS:  - Assess and monitor for signs and symptoms of infection  - Monitor lab/diagnostic results  - Monitor all insertion sites, i e  indwelling lines, tubes, and drains  - Monitor endotracheal if appropriate and nasal secretions for changes in amount and color  - [place] appropriate cooling/warming therapies per order  - Administer medications as ordered  - Instruct and encourage patient and family to use good hand hygiene technique  - Identify and instruct in appropriate isolation precautions for identified infection/condition  Outcome: Progressing     Problem: SAFETY ADULT  Goal: Patient will remain free of falls  Description: INTERVENTIONS:  - Educate patient/family on patient safety including physical limitations  - Instruct patient to call for assistance with activity   - Consult OT/PT to assist with strengthening/mobility   - Keep Call bell within reach  - Keep bed low and locked with side rails adjusted as appropriate  - Keep care items and personal belongings within reach  - Initiate and maintain comfort rounds  - Make Fall Risk Sign visible to staff  - Offer Toileting every 2 Hours, in advance of need  - Initiate/Maintain bed alarm  - Obtain necessary fall risk management equipment:   - Apply yellow socks and bracelet for high fall risk patients  - Consider moving patient to room near nurses station  Outcome: Progressing     Problem: DISCHARGE PLANNING  Goal: Discharge to home or other facility with appropriate resources  Description: INTERVENTIONS:  - Identify barriers to discharge w/patient and caregiver  - Arrange for needed discharge resources and transportation as appropriate  - Identify discharge learning needs (meds, wound care, etc )  - Arrange for interpretive services to assist at discharge as needed  - Refer to Case Management Department for coordinating discharge planning if the patient needs post-hospital services based on physician/advanced practitioner order or complex needs related to functional status, cognitive ability, or social support system  Outcome: Progressing     Problem: Knowledge Deficit  Goal: Patient/family/caregiver demonstrates understanding of disease process, treatment plan, medications, and discharge instructions  Description: Complete learning assessment and assess knowledge base  Interventions:  - Provide teaching at level of understanding  - Provide teaching via preferred learning methods  Outcome: Progressing     Problem: Nutrition/Hydration-ADULT  Goal: Nutrient/Hydration intake appropriate for improving, restoring or maintaining nutritional needs  Description: Monitor and assess patient's nutrition/hydration status for malnutrition  Collaborate with interdisciplinary team and initiate plan and interventions as ordered  Monitor patient's weight and dietary intake as ordered or per policy  Utilize nutrition screening tool and intervene as necessary  Determine patient's food preferences and provide high-protein, high-caloric foods as appropriate       INTERVENTIONS:  - Monitor oral intake, urinary output, labs, and treatment plans  - Assess nutrition and hydration status and recommend course of action  - Evaluate amount of meals eaten  - Assist patient with eating if necessary   - Allow adequate time for meals  - Recommend/ encourage appropriate diets, oral nutritional supplements, and vitamin/mineral supplements  - Order, calculate, and assess calorie counts as needed  - Recommend, monitor, and adjust tube feedings and TPN/PPN based on assessed needs  - Assess need for intravenous fluids  - Provide specific nutrition/hydration education as appropriate  - Include patient/family/caregiver in decisions related to nutrition  Outcome: Progressing     Problem: MOBILITY - ADULT  Goal: Maintain or return to baseline ADL function  Description: INTERVENTIONS:  -  Assess patient's ability to carry out ADLs; assess patient's baseline for ADL function and identify physical deficits which impact ability to perform ADLs (bathing, care of mouth/teeth, toileting, grooming, dressing, etc )  - Assess/evaluate cause of self-care deficits   - Assess range of motion  - Assess patient's mobility; develop plan if impaired  - Assess patient's need for assistive devices and provide as appropriate  - Encourage maximum independence but intervene and supervise when necessary  - Involve family in performance of ADLs  - Assess for home care needs following discharge   - Consider OT consult to assist with ADL evaluation and planning for discharge  - Provide patient education as appropriate  Outcome: Progressing  Goal: Maintains/Returns to pre admission functional level  Description: INTERVENTIONS:  - Perform BMAT or MOVE assessment daily    - Set and communicate daily mobility goal to care team and patient/family/caregiver  - Collaborate with rehabilitation services on mobility goals if consulted  - Perform Range of Motion 2 times a day  - Reposition patient every 2 hours    - Dangle patient 2 times a day  - Stand patient 2 times a day  - Ambulate patient 2 times a day  - Out of bed to chair 2 times a day   - Out of bed for meals 2 times a day  - Out of bed for toileting  - Record patient progress and toleration of activity level   Outcome: Progressing

## 2022-08-14 NOTE — PROGRESS NOTES
[place]  Progress Note - Akosua Sender 1955, 74 y o  male MRN: [id number]  Unit/Bed#: -[facility] Encounter: [id number]  Primary Care Provider: Sherrill Lowe DO   Date and time admitted to hospital: 8/9/2022  6:29 PM    * Diabetic ulcer of left foot associated with type 2 diabetes mellitus ([facility] )  Assessment & Plan  · Presented due to new ulcer on his inner left foot with swelling and erythema  Saw podiatry outpatient on 8/9 who recommended he go to the ER for osteomyelitis workup  · Denies fevers or chills   · Met sepsis criteria due to tachycardia, WBC 15 25   · Lactic and procalcitonin WNL  · CRP 88 6  · Sed rate 83  · Home regimen: Glipizide 10 mg bid, Admelog bid, jardiance and metformin   · Hold oral hypoglycemics   · SSI   · A1c 6 5  · Xray: No radiographic evidence of osteomyelitis  · Continue IV cefepime and vancomycin  · MRI:Plantar skin ulceration 1st MTP with subcutaneous edema indicating cellulitis without evidence of drainable abscess collection in this unenhanced study  No evidence of T1 replacement signal to indicate osteomyelitis at this time  · Consult Podiatry   · I&D 8/12  · Cultures pending    Hypertensive urgency  Assessment & Plan  · Home regimen: losartan-HCTZ 100-25 mg daily, Nifedipine 90 mg daily, propranolol 160 mg daily   · HTN today  · Increased HCTZ to 50  · IV Hydralazine PRN   · Continue to monitor     Sepsis ([place] [address] )  Assessment & Plan  · Met sepsis due to tachycardia, WBC 15 25  Source of infection cellulitis of diabetic ulcer on left foot  R/O osteo   · Lactic WNL     · Procal 0 08   · IV cefepime and IV vancomycin  · BC NGTD  Wound cx growing 2+ Growth of Citrobacter koseri,    4+ Polys Abnormal     1+ Gram positive cocci in pairs     · cx from procedure pending    Hypercholesterolemia  Assessment & Plan  · Continue statin          VTE Pharmacologic Prophylaxis: VTE Score: 3 Moderate Risk (Score 3-4) - Pharmacological DVT Prophylaxis Ordered: heparin      Patient Centered Rounds: I performed bedside rounds with nursing staff today  Discussions with Specialists or Other Care Team Provider: Podiatry, CM, PT, OT     Education and Discussions with Family / Patient: pt       Time Spent for Care: 30 minutes  More than 50% of total time spent on counseling and coordination of care as described above      Current Length of Stay: 3 day(s)  Current Patient Status: Inpatient   Certification Statement: The patient will continue to require additional inpatient hospital stay due to DM foot ulcer  Discharge Plan: Anticipate discharge in 24-48 hrs to discharge location to be determined pending rehab evaluations      Code Status: Level 1 - Full Code     Subjective:   Juan was seen and examined at bedside   No acute events overnight   Discussed plan of care   All questions and concerns were answered and addressed   No acute symptoms on review systems  Objective:     Vitals:   Temp (24hrs), Av °F (36 7 °C), Min:97 7 °F (36 5 °C), Max:98 3 °F (36 8 °C)    Temp:  [97 7 °F (36 5 °C)-98 3 °F (36 8 °C)] 98 °F (36 7 °C)  HR:  [69-75] 73  Resp:  [15-16] 15  BP: (137-220)/() 198/100  SpO2:  [98 %-99 %] 99 %  Body mass index is 29 56 kg/m²  Input and Output Summary (last 24 hours): Intake/Output Summary (Last 24 hours) at 2022 2272  Last data filed at 2022 1933  Gross per 24 hour   Intake 1215 ml   Output --   Net 1215 ml       Physical Exam:   Physical Exam  Vitals and nursing note reviewed  Constitutional:       Appearance: Normal appearance  HENT:      Head: Normocephalic and atraumatic  Cardiovascular:      Rate and Rhythm: Normal rate and regular rhythm  Pulses: Normal pulses  Heart sounds: Normal heart sounds  Pulmonary:      Effort: Pulmonary effort is normal       Breath sounds: Normal breath sounds  Abdominal:      General: Abdomen is flat  Bowel sounds are normal       Palpations: Abdomen is soft  Musculoskeletal:      Right lower leg: No edema  Left lower leg: No edema  Skin:     General: Skin is warm  Neurological:      General: No focal deficit present  Mental Status: He is alert and oriented to person, place, and time  Additional Data:     Labs:  Results from last 7 days   Lab Units 08/14/22  0646 08/11/22  0448 08/10/22  0456   WBC Thousand/uL 10 78*   < > 12 07*   HEMOGLOBIN g/dL 13 9   < > 12 5   HEMATOCRIT % 43 9   < > 40 4   PLATELETS Thousands/uL 447*   < > 364   NEUTROS PCT %  --   --  74   LYMPHS PCT %  --   --  15   MONOS PCT %  --   --  8   EOS PCT %  --   --  2    < > = values in this interval not displayed  Results from last 7 days   Lab Units 08/14/22  0646 08/10/22  0456 08/09/22  1907   SODIUM mmol/L 136   < > 136   POTASSIUM mmol/L 3 7   < > 3 5   CHLORIDE mmol/L 96   < > 93*   CO2 mmol/L 33*   < > 33*   BUN mg/dL 10   < > 7   CREATININE mg/dL 0 80   < > 0 91   ANION GAP mmol/L 7   < > 10   CALCIUM mg/dL 9 9   < > 9 5   ALBUMIN g/dL  --   --  3 8   TOTAL BILIRUBIN mg/dL  --   --  0 50   ALK PHOS U/L  --   --  107   ALT U/L  --   --  15   AST U/L  --   --  25   GLUCOSE RANDOM mg/dL 166*   < > 100    < > = values in this interval not displayed  Results from last 7 days   Lab Units 08/14/22  0714 08/13/22  2159 08/13/22  1617 08/13/22  1044 08/13/22  0735 08/12/22  2126 08/12/22  1841 08/12/22  1620 08/12/22  1117 08/12/22  0726 08/11/22  2225 08/11/22  1701   POC GLUCOSE mg/dl 165* 202* 176* 301* 204* 154* 121 66 339* 153* 126 194*     Results from last 7 days   Lab Units 08/10/22  0456   HEMOGLOBIN A1C % 6 5*     Results from last 7 days   Lab Units 08/11/22  0448 08/10/22  0456 08/09/22  2041 08/09/22  2040   LACTIC ACID mmol/L  --   --   --  1 2   PROCALCITONIN ng/ml 0 07 0 08 0 09  --        Lines/Drains:  Invasive Devices  Report    Peripheral Intravenous Line  Duration           Peripheral IV 08/13/22 Dorsal (posterior); Left Hand <1 day Imaging: Reviewed radiology reports from this admission including: procedure reports    Recent Cultures (last 7 days):   Results from last 7 days   Lab Units 08/10/22  1818 08/09/22 2046 08/09/22  1900   BLOOD CULTURE   --  No Growth After 4 Days  No Growth After 4 Days     GRAM STAIN RESULT  4+ Polys*  1+ Gram positive cocci in pairs*  Rare Gram negative rods*  --   --    WOUND CULTURE  2+ Growth of Citrobacter koseri*  2+ Growth of   --   --        Last 24 Hours Medication List:   Current Facility-Administered Medications   Medication Dose Route Frequency Provider Last Rate    acetaminophen  650 mg Oral Q6H PRN Luciana Allen PA-C      aspirin  81 mg Oral Daily Luciana Allen PA-C      cefepime  2,000 mg Intravenous Q12H BRADLEY AlcantarC 2,000 mg (08/14/22 0903)    clonazePAM  1 mg Oral BID PRN Luciana Allen PA-C      cyclobenzaprine  10 mg Oral HS PRN Luciana Allen PA-C      docusate sodium  100 mg Oral BID Luciana Allen PA-C      DULoxetine  60 mg Oral BID Luciana Allen PA-C      fluticasone  2 spray Each Nare Daily Luciana Allen PA-C      folic acid  2 mg Oral Daily Luciana Allen PA-C      heparin (porcine)  5,000 Units Subcutaneous [address] Luciana Allen PA-C      hydrALAZINE  5 mg Intravenous Q6H PRN Luciana Allen PA-C      [START ON 8/15/2022] hydrochlorothiazide  50 mg Oral Daily Shannan Hyatt MD      insulin glargine  37 Units Subcutaneous HS Shannan Hyatt MD      insulin lispro  1-6 Units Subcutaneous TID AC Luciana Allen PA-C      insulin lispro  1-6 Units Subcutaneous HS Luciana Allen PA-C      lactated ringers  10 mL/hr Intravenous Continuous Beryl Fajardo CRNA Stopped (08/13/22 6022)    lidocaine  1 patch Topical HS Luciana Allen PA-C      loratadine  10 mg Oral Daily Luciana Allen PA-C      losartan  100 mg Oral Daily Luciana Allen PA-C      mirtazapine  45 mg Oral HS Marisel VENEGAS Moisés Meneses PA-C      neomycin-polymyxin B (NEOSPORIN ) irrigation solution   Irrigation Once Vickieestabel Ware PA-C      NIFEdipine  120 mg Oral Daily Celestia DANIEL Ware      ondansetron  4 mg Intravenous Q6H PRN Vickieestabel Ware PA-C      ondansetron  4 mg Intravenous Once PRN Miles Devlin CRNA      oxyCODONE  5 mg Oral Q6H PRN Giorgi Cancino MD      polyethylene glycol  17 g Oral Daily PRN Giorgi Cancino MD      pravastatin  20 mg Oral Daily With Dinner Raina Ware PA-C      propranolol  160 mg Oral Daily Celestia DANIEL Ware      sertraline  75 mg Oral Daily Celestia DANIEL Ware      traMADol  50 mg Oral Q6H PRN Raina Ware PA-C      vancomycin  1,250 mg Intravenous Q12H Giorgi Cancino MD 1,250 mg (08/14/22 5830)        Today, Patient Was Seen By: Giorgi Cancino MD    **Please Note: This note may have been constructed using a voice recognition system  **

## 2022-08-14 NOTE — PLAN OF CARE
Problem: Potential for Falls  Goal: Patient will remain free of falls  Description: INTERVENTIONS:  - Educate patient/family on patient safety including physical limitations  - Instruct patient to call for assistance with activity   - Consult OT/PT to assist with strengthening/mobility   - Keep Call bell within reach  - Keep bed low and locked with side rails adjusted as appropriate  - Keep care items and personal belongings within reach  - Initiate and maintain comfort rounds  - Make Fall Risk Sign visible to staff  - Offer Toileting every 2 Hours, in advance of need  - Initiate/Maintain bed alarm  - Obtain necessary fall risk management equipment:   - Apply yellow socks and bracelet for high fall risk patients  - Consider moving patient to room near nurses station  Outcome: Progressing     Problem: METABOLIC, FLUID AND ELECTROLYTES - ADULT  Goal: Electrolytes maintained within normal limits  Description: INTERVENTIONS:  - Monitor labs and assess patient for signs and symptoms of electrolyte imbalances  - Administer electrolyte replacement as ordered  - Monitor response to electrolyte replacements, including repeat lab results as appropriate  - Instruct patient on fluid and nutrition as appropriate  Outcome: Progressing     Problem: SKIN/TISSUE INTEGRITY - ADULT  Goal: Skin Integrity remains intact(Skin Breakdown Prevention)  Description: Assess:  -Perform Sanjay assessment  -Clean and moisturize skin  -Inspect skin when repositioning, toileting, and assisting with ADLS  -Assess under medical devices  -Assess extremities for adequate circulation and sensation     Bed Management:  -Have minimal linens on bed & keep smooth, unwrinkled  -Change linens as needed when moist or perspiring  -Avoid sitting or lying in one position for more than 2 hours while in bed  -Keep HOB at 30 degrees     Toileting:  -Offer bedside commode  -Assess for incontinence  -Use incontinent care products after each incontinent episode    Activity:  -Mobilize patient 2 times a day  -Encourage activity and walks on unit  -Encourage or provide ROM exercises   -Turn and reposition patient every 2 Hours  -Use appropriate equipment to lift or move patient in bed  -Instruct/ Assist with weight shifting when out of bed in chair  -Consider limitation of chair time 2 hour intervals    Skin Care:  -Avoid use of baby powder, tape, friction and shearing, hot water or constrictive clothing  -Relieve pressure over bony prominences  -Do not massage red bony areas    Next Steps:  -Teach patient strategies to minimize risks   -Consider consults to  interdisciplinary teams   Outcome: Progressing  Goal: Incision(s), wounds(s) or drain site(s) healing without S/S of infection  Description: INTERVENTIONS  - Assess and document dressing, incision, wound bed, drain sites and surrounding tissue  - Provide patient and family education  - Perform skin care/dressing changes  Outcome: Progressing     Problem: HEMATOLOGIC - ADULT  Goal: Maintains hematologic stability  Description: INTERVENTIONS  - Assess for signs and symptoms of bleeding or hemorrhage  - Monitor labs  - Administer supportive blood products/factors as ordered and appropriate  Outcome: Progressing     Problem: MUSCULOSKELETAL - ADULT  Goal: Maintain or return mobility to safest level of function  Description: INTERVENTIONS:  - Assess patient's ability to carry out ADLs; assess patient's baseline for ADL function and identify physical deficits which impact ability to perform ADLs (bathing, care of mouth/teeth, toileting, grooming, dressing, etc )  - Assess/evaluate cause of self-care deficits   - Assess range of motion  - Assess patient's mobility  - Assess patient's need for assistive devices and provide as appropriate  - Encourage maximum independence but intervene and supervise when necessary  - Involve family in performance of ADLs  - Assess for home care needs following discharge   - Consider OT consult to assist with ADL evaluation and planning for discharge  - Provide patient education as appropriate  Outcome: Progressing     Problem: PAIN - ADULT  Goal: Verbalizes/displays adequate comfort level or baseline comfort level  Description: Interventions:  - Encourage patient to monitor pain and request assistance  - Assess pain using appropriate pain scale  - Administer analgesics based on type and severity of pain and evaluate response  - Implement non-pharmacological measures as appropriate and evaluate response  - Consider cultural and social influences on pain and pain management  - Notify physician/advanced practitioner if interventions unsuccessful or patient reports new pain  Outcome: Progressing     Problem: INFECTION - ADULT  Goal: Absence or prevention of progression during hospitalization  Description: INTERVENTIONS:  - Assess and monitor for signs and symptoms of infection  - Monitor lab/diagnostic results  - Monitor all insertion sites, i e  indwelling lines, tubes, and drains  - Monitor endotracheal if appropriate and nasal secretions for changes in amount and color  - [place] appropriate cooling/warming therapies per order  - Administer medications as ordered  - Instruct and encourage patient and family to use good hand hygiene technique  - Identify and instruct in appropriate isolation precautions for identified infection/condition  Outcome: Progressing     Problem: SAFETY ADULT  Goal: Patient will remain free of falls  Description: INTERVENTIONS:  - Educate patient/family on patient safety including physical limitations  - Instruct patient to call for assistance with activity   - Consult OT/PT to assist with strengthening/mobility   - Keep Call bell within reach  - Keep bed low and locked with side rails adjusted as appropriate  - Keep care items and personal belongings within reach  - Initiate and maintain comfort rounds  - Make Fall Risk Sign visible to staff  - Offer Toileting every 2 Hours, in advance of need  - Initiate/Maintain bed alarm  - Obtain necessary fall risk management equipment:   - Apply yellow socks and bracelet for high fall risk patients  - Consider moving patient to room near nurses station  Outcome: Progressing     Problem: DISCHARGE PLANNING  Goal: Discharge to home or other facility with appropriate resources  Description: INTERVENTIONS:  - Identify barriers to discharge w/patient and caregiver  - Arrange for needed discharge resources and transportation as appropriate  - Identify discharge learning needs (meds, wound care, etc )  - Arrange for interpretive services to assist at discharge as needed  - Refer to Case Management Department for coordinating discharge planning if the patient needs post-hospital services based on physician/advanced practitioner order or complex needs related to functional status, cognitive ability, or social support system  Outcome: Progressing     Problem: Knowledge Deficit  Goal: Patient/family/caregiver demonstrates understanding of disease process, treatment plan, medications, and discharge instructions  Description: Complete learning assessment and assess knowledge base  Interventions:  - Provide teaching at level of understanding  - Provide teaching via preferred learning methods  Outcome: Progressing     Problem: Nutrition/Hydration-ADULT  Goal: Nutrient/Hydration intake appropriate for improving, restoring or maintaining nutritional needs  Description: Monitor and assess patient's nutrition/hydration status for malnutrition  Collaborate with interdisciplinary team and initiate plan and interventions as ordered  Monitor patient's weight and dietary intake as ordered or per policy  Utilize nutrition screening tool and intervene as necessary  Determine patient's food preferences and provide high-protein, high-caloric foods as appropriate       INTERVENTIONS:  - Monitor oral intake, urinary output, labs, and treatment plans  - Assess nutrition and hydration status and recommend course of action  - Evaluate amount of meals eaten  - Assist patient with eating if necessary   - Allow adequate time for meals  - Recommend/ encourage appropriate diets, oral nutritional supplements, and vitamin/mineral supplements  - Order, calculate, and assess calorie counts as needed  - Recommend, monitor, and adjust tube feedings and TPN/PPN based on assessed needs  - Assess need for intravenous fluids  - Provide specific nutrition/hydration education as appropriate  - Include patient/family/caregiver in decisions related to nutrition  Outcome: Progressing     Problem: MOBILITY - ADULT  Goal: Maintain or return to baseline ADL function  Description: INTERVENTIONS:  -  Assess patient's ability to carry out ADLs; assess patient's baseline for ADL function and identify physical deficits which impact ability to perform ADLs (bathing, care of mouth/teeth, toileting, grooming, dressing, etc )  - Assess/evaluate cause of self-care deficits   - Assess range of motion  - Assess patient's mobility; develop plan if impaired  - Assess patient's need for assistive devices and provide as appropriate  - Encourage maximum independence but intervene and supervise when necessary  - Involve family in performance of ADLs  - Assess for home care needs following discharge   - Consider OT consult to assist with ADL evaluation and planning for discharge  - Provide patient education as appropriate  Outcome: Progressing  Goal: Maintains/Returns to pre admission functional level  Description: INTERVENTIONS:  - Perform BMAT or MOVE assessment daily    - Set and communicate daily mobility goal to care team and patient/family/caregiver  - Collaborate with rehabilitation services on mobility goals if consulted  - Perform Range of Motion 5 times a day  - Reposition patient every 2 hours    - Dangle patient 3 times a day  - Stand patient 3 times a day  - Ambulate patient 3 times a day  - Out of bed to chair 3 times a day   - Out of bed for meals 3 times a day  - Out of bed for toileting  - Record patient progress and toleration of activity level   Outcome: Progressing

## 2022-08-14 NOTE — PROGRESS NOTES
Progress Note - Podiatry  Frank Thomson 74 y o  male MRN: [id number]  Unit/Bed#: -01 Encounter: [id number]    Assessment/Plan:  Cellulitis/abscess left foot/sepsis  · Continue with current IV antibiotics  · X-rays show no destructive changes suggesting osteomyelitis  · Elevated CRP and ESR 83 remain suspicious for underlying bone infection  · MRI  shows no underlying osteomyelitis  · WBC 15 25 upon admission, 10 51 today which continues to trend upward since surgical I&D on Friday evening, despite good clinical progress reflected by diminished/resolving erythema edema and calor  · Discussed with Internal Medicine and agree with keeping patient on broad-spectrum antibiotics until tomorrow  Patient does have chronic lower back issues and Internal Medicine will explore that as a possible source of underlying infection with CT scan  If WBC does not diminish will hold off on narrowing antibiotic spectrum of coverage and consider Infectious Disease consult      Diabetic Skin ulceration left forefoot with fat layer exposed  · POD #2 S/P I&D LFT ulceration with abscess  · Alginate dressing removed, wound irrigated with NSS  · Dry sterile dressing with silver alginate applied left foot  · Wound culture obtained 8/10/2022 grew Citrobacter koseri  · OR cultures aerobic and anaerobic still pending     DM2 with neuropathy, Hypertensive urgency, hypercholesterolemia  · Managed per Internal Medicine  · Recommend patient continue with regular diabetic foot care  · Will require new diabetic shoes in the near future        Subjective/Objective   Chief Complaint:   Chief Complaint   Patient presents with    Wound Infection     Pt reports being diabetic and two days ago noticed swelling and redness on a boil on his inner left foot  Denies fevers  Subjective:  72-year-old white male seen resting comfortably in bed  Seems somewhat anxious today and is concerned about his high blood pressure over the past 24 hours  No acute pedal concerns  Denies any pain from his left foot overnight  Denies any shortness of breath  Blood pressure (!) 176/86, pulse 73, temperature 98 °F (36 7 °C), temperature source Oral, resp  rate 15, height 5' 10" (1 778 m), weight 93 4 kg (206 lb), SpO2 99 %  ,Body mass index is 29 56 kg/m²  Invasive Devices  Report    Peripheral Intravenous Line  Duration           Peripheral IV 08/13/22 Dorsal (posterior); Left Hand <1 day                Physical Exam:   General appearance: alert, cooperative and no distress  Neuro/Vascular: Normal strength  Sensation and reflexes:  Diminished epicritic sensations with loss of protective sensation consistent with advanced diabetic neuropathy  Pulses: Right: DP 1/4, PT 0/4, Left: DP 1/4, PT 0/4  Capillary Filling:  3 Sec, Edema:  +1 left forefoot  Ulcers/Wounds:  Left foot sub 1st MPJ full-thickness ulcerative breakdown S/P I&D/Debridement  continues to have diminishing erythema, no calor noted, and edema has resolved  Wound bed appears pink and healthy with bleeding edges  No active necrosis noted  No purulent drainage noted, only minimal/mild serosanguineous drainage within bandage noted  Labs and other studies:   CBC w/diff  Results from last 7 days   Lab Units 08/14/22  0646 08/11/22  0448 08/10/22  0456   WBC Thousand/uL 10 78*   < > 12 07*   HEMOGLOBIN g/dL 13 9   < > 12 5   HEMATOCRIT % 43 9   < > 40 4   PLATELETS Thousands/uL 447*   < > 364   NEUTROS PCT %  --   --  74   LYMPHS PCT %  --   --  15   MONOS PCT %  --   --  8   EOS PCT %  --   --  2    < > = values in this interval not displayed       BMP  Results from last 7 days   Lab Units 08/14/22  0646   POTASSIUM mmol/L 3 7   CHLORIDE mmol/L 96   CO2 mmol/L 33*   BUN mg/dL 10   CREATININE mg/dL 0 80   CALCIUM mg/dL 9 9     CMP  Results from last 7 days   Lab Units 08/14/22  0646 08/10/22  0456 08/09/22  1907   POTASSIUM mmol/L 3 7   < > 3 5   CHLORIDE mmol/L 96   < > 93*   CO2 mmol/L 33* < > 33*   BUN mg/dL 10   < > 7   CREATININE mg/dL 0 80   < > 0 91   CALCIUM mg/dL 9 9   < > 9 5   ALK PHOS U/L  --   --  107   ALT U/L  --   --  15   AST U/L  --   --  25    < > = values in this interval not displayed  @Culture@  Lab Results   Component Value Date    BLOODCX No Growth After 4 Days  08/09/2022    BLOODCX No Growth After 4 Days   08/09/2022     Lab Results   Component Value Date    WOUNDCULT 2+ Growth of Citrobacter koseri (A) 08/10/2022    WOUNDCULT 2+ Growth of  08/10/2022         Current Facility-Administered Medications:     acetaminophen (TYLENOL) tablet 650 mg, 650 mg, Oral, Q6H PRN, Andres Cee PA-C, 650 mg at 08/12/22 0404    aspirin (ECOTRIN LOW STRENGTH) EC tablet 81 mg, 81 mg, Oral, Daily, ABDIRASHID Heredia-C, 81 mg at 08/14/22 0902    cefepime (MAXIPIME) IVPB (premix in dextrose) 2,000 mg 50 mL, 2,000 mg, Intravenous, Q12H, ABDIRASHID Heredia-C, Last Rate: 100 mL/hr at 08/14/22 0903, 2,000 mg at 08/14/22 7853    clonazePAM (KlonoPIN) tablet 1 mg, 1 mg, Oral, BID PRN, Andres Cee PA-C, 1 mg at 08/14/22 6575    cyclobenzaprine (FLEXERIL) tablet 10 mg, 10 mg, Oral, HS PRN, Andres Cee PA-C, 10 mg at 08/12/22 0234    docusate sodium (COLACE) capsule 100 mg, 100 mg, Oral, BID, Andres Cee PA-C, 100 mg at 08/14/22 0902    DULoxetine (CYMBALTA) delayed release capsule 60 mg, 60 mg, Oral, BID, Andres Cee PA-C, 60 mg at 08/14/22 4073    fluticasone (FLONASE) 50 mcg/act nasal spray 2 spray, 2 spray, Each Nare, Daily, ABDIRASHID Heredia-C, 2 spray at 95/98/13 0245    folic acid (FOLVITE) tablet 2 mg, 2 mg, Oral, Daily, Andres Cee PA-C, 2 mg at 08/14/22 1193    heparin (porcine) subcutaneous injection 5,000 Units, 5,000 Units, Subcutaneous, Q8H [address], 5,000 Units at 08/14/22 0428 **AND** [CANCELED] Platelet count, , , Once, Dotty DANIEL Hernández    hydrALAZINE (APRESOLINE) injection 5 mg, 5 mg, Intravenous, Q6H PRN, Andres Cee, DANIEL, 5 mg at 08/14/22 0859    [START ON 8/15/2022] hydrochlorothiazide (HYDRODIURIL) tablet 50 mg, 50 mg, Oral, Daily, Gwen Awan MD    insulin glargine (LANTUS) subcutaneous injection 37 Units 0 37 mL, 37 Units, Subcutaneous, HS, Gwen Awan MD    insulin lispro (HumaLOG) 100 units/mL subcutaneous injection 1-6 Units, 1-6 Units, Subcutaneous, TID AC, 1 Units at 08/14/22 0732 **AND** Fingerstick Glucose (POCT), , , TID AC, Maik Moe PA-C    insulin lispro (HumaLOG) 100 units/mL subcutaneous injection 1-6 Units, 1-6 Units, Subcutaneous, HS, Maik Moe PA-C, 2 Units at 08/13/22 2305    labetalol (NORMODYNE) injection 10 mg, 10 mg, Intravenous, Q6H PRN, Gwen Awan MD, 10 mg at 08/14/22 1002    lidocaine (LIDODERM) 5 % patch 1 patch, 1 patch, Topical, HS, Maik Moe PA-C, 1 patch at 08/13/22 2227    loratadine (CLARITIN) tablet 10 mg, 10 mg, Oral, Daily, Maik Moe PA-C, 10 mg at 08/14/22 0902    LORazepam (ATIVAN) injection 1 mg, 1 mg, Intravenous, Q6H PRN, Gwen Awan MD, 1 mg at 08/14/22 1002    losartan (COZAAR) tablet 100 mg, 100 mg, Oral, Daily, Maik Moe PA-C, 100 mg at 08/14/22 0730    mirtazapine (REMERON) tablet 45 mg, 45 mg, Oral, HS, Maik Moe PA-C, 45 mg at 08/13/22 2227    neomycin-polymyxin B (NEOSPORIN-) 1 mL in sodium chloride 0 9 % 1,000 mL irrigation, , Irrigation, Once, Maik Moe PA-C    NIFEdipine (PROCARDIA XL) 24 hr tablet 120 mg, 120 mg, Oral, Daily, Maik Moe PA-C, 120 mg at 08/14/22 0730    ondansetron (ZOFRAN) injection 4 mg, 4 mg, Intravenous, Q6H PRN, Maik Moe PA-C, 4 mg at 08/14/22 0618    ondansetron (ZOFRAN) injection 4 mg, 4 mg, Intravenous, Once PRN, Kumar Breath, CRNA    oxyCODONE (ROXICODONE) IR tablet 5 mg, 5 mg, Oral, Q6H PRN, Gwen Awan MD, 5 mg at 08/14/22 0902    polyethylene glycol (MIRALAX) packet 17 g, 17 g, Oral, Daily PRN, Gwen Awan MD, 17 g at 08/14/22 0903    pravastatin (PRAVACHOL) tablet 20 mg, 20 mg, Oral, Daily With Dinner, Tawana Ireland PA-C, 20 mg at 08/13/22 1718    propranolol (INDERAL LA) 24 hr capsule 160 mg, 160 mg, Oral, Daily, Tawana Ireland PA-C, 160 mg at 08/14/22 0730    sertraline (ZOLOFT) tablet 75 mg, 75 mg, Oral, Daily, Tawana Ireland PA-C, 75 mg at 08/14/22 6852    traMADol (ULTRAM) tablet 50 mg, 50 mg, Oral, Q6H PRN, Tawana Ireland PA-C, 50 mg at 08/14/22 0435    vancomycin (VANCOCIN) 1,250 mg in sodium chloride 0 9 % 250 mL IVPB, 1,250 mg, Intravenous, Q12H, Kera Lopes MD, Last Rate: 166 7 mL/hr at 08/14/22 0428, 1,250 mg at 08/14/22 0428    Imaging: I have personally reviewed pertinent films in PACS  EKG, Pathology, and Other Studies: I have personally reviewed pertinent reports    VTE Pharmacologic Prophylaxis: Heparin  VTE Mechanical Prophylaxis: sequential compression device    Billy Johnson DPM

## 2022-08-15 VITALS
TEMPERATURE: 98 F | RESPIRATION RATE: 15 BRPM | WEIGHT: 206 LBS | DIASTOLIC BLOOD PRESSURE: 76 MMHG | OXYGEN SATURATION: 97 % | HEART RATE: 71 BPM | SYSTOLIC BLOOD PRESSURE: 163 MMHG | BODY MASS INDEX: 29.49 KG/M2 | HEIGHT: 70 IN

## 2022-08-15 PROBLEM — I16.0 HYPERTENSIVE URGENCY: Status: RESOLVED | Noted: 2022-08-09 | Resolved: 2022-08-15

## 2022-08-15 PROBLEM — A41.9 SEPSIS (HCC): Status: RESOLVED | Noted: 2022-08-09 | Resolved: 2022-08-15

## 2022-08-15 LAB
ANION GAP SERPL CALCULATED.3IONS-SCNC: 11 MMOL/L (ref 4–13)
BACTERIA BLD CULT: NORMAL
BACTERIA BLD CULT: NORMAL
BASOPHILS # BLD AUTO: 0.09 THOUSANDS/ΜL (ref 0–0.1)
BASOPHILS NFR BLD AUTO: 1 % (ref 0–1)
BUN SERPL-MCNC: 16 MG/DL (ref 5–25)
CALCIUM SERPL-MCNC: 10 MG/DL (ref 8.3–10.1)
CHLORIDE SERPL-SCNC: 95 MMOL/L (ref 96–108)
CO2 SERPL-SCNC: 28 MMOL/L (ref 21–32)
CREAT SERPL-MCNC: 1.04 MG/DL (ref 0.6–1.3)
EOSINOPHIL # BLD AUTO: 0.3 THOUSAND/ΜL (ref 0–0.61)
EOSINOPHIL NFR BLD AUTO: 3 % (ref 0–6)
ERYTHROCYTE [DISTWIDTH] IN BLOOD BY AUTOMATED COUNT: 14.8 % (ref 11.6–15.1)
GFR SERPL CREATININE-BSD FRML MDRD: 74 ML/MIN/1.73SQ M
GLUCOSE SERPL-MCNC: 187 MG/DL (ref 65–140)
GLUCOSE SERPL-MCNC: 232 MG/DL (ref 65–140)
GLUCOSE SERPL-MCNC: 235 MG/DL (ref 65–140)
HCT VFR BLD AUTO: 43.1 % (ref 36.5–49.3)
HGB BLD-MCNC: 13.2 G/DL (ref 12–17)
IMM GRANULOCYTES # BLD AUTO: 0.05 THOUSAND/UL (ref 0–0.2)
IMM GRANULOCYTES NFR BLD AUTO: 1 % (ref 0–2)
LYMPHOCYTES # BLD AUTO: 2.64 THOUSANDS/ΜL (ref 0.6–4.47)
LYMPHOCYTES NFR BLD AUTO: 27 % (ref 14–44)
MCH RBC QN AUTO: 26.7 PG (ref 26.8–34.3)
MCHC RBC AUTO-ENTMCNC: 30.6 G/DL (ref 31.4–37.4)
MCV RBC AUTO: 87 FL (ref 82–98)
MONOCYTES # BLD AUTO: 0.76 THOUSAND/ΜL (ref 0.17–1.22)
MONOCYTES NFR BLD AUTO: 8 % (ref 4–12)
NEUTROPHILS # BLD AUTO: 5.98 THOUSANDS/ΜL (ref 1.85–7.62)
NEUTS SEG NFR BLD AUTO: 60 % (ref 43–75)
NRBC BLD AUTO-RTO: 0 /100 WBCS
PLATELET # BLD AUTO: 410 THOUSANDS/UL (ref 149–390)
PMV BLD AUTO: 10 FL (ref 8.9–12.7)
POTASSIUM SERPL-SCNC: 3.8 MMOL/L (ref 3.5–5.3)
RBC # BLD AUTO: 4.95 MILLION/UL (ref 3.88–5.62)
SODIUM SERPL-SCNC: 134 MMOL/L (ref 135–147)
WBC # BLD AUTO: 9.82 THOUSAND/UL (ref 4.31–10.16)

## 2022-08-15 PROCEDURE — 85025 COMPLETE CBC W/AUTO DIFF WBC: CPT | Performed by: STUDENT IN AN ORGANIZED HEALTH CARE EDUCATION/TRAINING PROGRAM

## 2022-08-15 PROCEDURE — 97530 THERAPEUTIC ACTIVITIES: CPT

## 2022-08-15 PROCEDURE — 82948 REAGENT STRIP/BLOOD GLUCOSE: CPT

## 2022-08-15 PROCEDURE — 97116 GAIT TRAINING THERAPY: CPT

## 2022-08-15 PROCEDURE — 99239 HOSP IP/OBS DSCHRG MGMT >30: CPT | Performed by: PHYSICIAN ASSISTANT

## 2022-08-15 PROCEDURE — 80048 BASIC METABOLIC PNL TOTAL CA: CPT | Performed by: STUDENT IN AN ORGANIZED HEALTH CARE EDUCATION/TRAINING PROGRAM

## 2022-08-15 RX ORDER — OXYCODONE HYDROCHLORIDE 5 MG/1
10 TABLET ORAL EVERY 6 HOURS PRN
Qty: 10 TABLET | Refills: 0 | Status: SHIPPED | OUTPATIENT
Start: 2022-08-15 | End: 2022-08-25

## 2022-08-15 RX ORDER — POLYETHYLENE GLYCOL 3350 17 G/17G
17 POWDER, FOR SOLUTION ORAL DAILY PRN
Qty: 10 EACH | Refills: 0 | Status: SHIPPED | OUTPATIENT
Start: 2022-08-15

## 2022-08-15 RX ORDER — DOCUSATE SODIUM 100 MG/1
100 CAPSULE, LIQUID FILLED ORAL 2 TIMES DAILY
Qty: 14 CAPSULE | Refills: 0 | Status: SHIPPED | OUTPATIENT
Start: 2022-08-15

## 2022-08-15 RX ORDER — SENNOSIDES 8.6 MG
2 TABLET ORAL 2 TIMES DAILY
Status: DISCONTINUED | OUTPATIENT
Start: 2022-08-15 | End: 2022-08-15 | Stop reason: HOSPADM

## 2022-08-15 RX ORDER — AMOXICILLIN AND CLAVULANATE POTASSIUM 875; 125 MG/1; MG/1
1 TABLET, FILM COATED ORAL EVERY 12 HOURS SCHEDULED
Qty: 14 TABLET | Refills: 0 | Status: SHIPPED | OUTPATIENT
Start: 2022-08-15 | End: 2022-08-22

## 2022-08-15 RX ORDER — SENNOSIDES 8.6 MG
17.2 TABLET ORAL 2 TIMES DAILY
Qty: 28 TABLET | Refills: 0 | Status: SHIPPED | OUTPATIENT
Start: 2022-08-15

## 2022-08-15 RX ADMIN — ASPIRIN 81 MG: 81 TABLET, COATED ORAL at 08:15

## 2022-08-15 RX ADMIN — INSULIN LISPRO 3 UNITS: 100 INJECTION, SOLUTION INTRAVENOUS; SUBCUTANEOUS at 11:10

## 2022-08-15 RX ADMIN — CEFEPIME HYDROCHLORIDE 2000 MG: 2 INJECTION, SOLUTION INTRAVENOUS at 08:17

## 2022-08-15 RX ADMIN — VANCOMYCIN HYDROCHLORIDE 1250 MG: 5 INJECTION, POWDER, LYOPHILIZED, FOR SOLUTION INTRAVENOUS at 05:21

## 2022-08-15 RX ADMIN — POLYETHYLENE GLYCOL 3350 17 G: 17 POWDER, FOR SOLUTION ORAL at 05:14

## 2022-08-15 RX ADMIN — HYDROCHLOROTHIAZIDE 50 MG: 25 TABLET ORAL at 08:16

## 2022-08-15 RX ADMIN — NIFEDIPINE 120 MG: 30 TABLET, FILM COATED, EXTENDED RELEASE ORAL at 08:15

## 2022-08-15 RX ADMIN — CLONAZEPAM 1 MG: 1 TABLET ORAL at 05:20

## 2022-08-15 RX ADMIN — OXYCODONE HYDROCHLORIDE 5 MG: 5 TABLET ORAL at 05:14

## 2022-08-15 RX ADMIN — SERTRALINE HYDROCHLORIDE 75 MG: 25 TABLET ORAL at 08:15

## 2022-08-15 RX ADMIN — DULOXETINE 60 MG: 30 CAPSULE, DELAYED RELEASE ORAL at 08:15

## 2022-08-15 RX ADMIN — DOCUSATE SODIUM 100 MG: 100 CAPSULE, LIQUID FILLED ORAL at 08:15

## 2022-08-15 RX ADMIN — LORATADINE 10 MG: 10 TABLET ORAL at 08:15

## 2022-08-15 RX ADMIN — LOSARTAN POTASSIUM 100 MG: 50 TABLET, FILM COATED ORAL at 08:15

## 2022-08-15 RX ADMIN — OXYCODONE HYDROCHLORIDE 5 MG: 5 TABLET ORAL at 11:09

## 2022-08-15 RX ADMIN — HEPARIN SODIUM 5000 UNITS: 5000 INJECTION INTRAVENOUS; SUBCUTANEOUS at 05:14

## 2022-08-15 RX ADMIN — PROPRANOLOL HYDROCHLORIDE 160 MG: 80 CAPSULE, EXTENDED RELEASE ORAL at 08:16

## 2022-08-15 RX ADMIN — INSULIN LISPRO 1 UNITS: 100 INJECTION, SOLUTION INTRAVENOUS; SUBCUTANEOUS at 08:34

## 2022-08-15 RX ADMIN — SENNOSIDES 17.2 MG: 8.6 TABLET, FILM COATED ORAL at 08:22

## 2022-08-15 RX ADMIN — FOLIC ACID 2 MG: 1 TABLET ORAL at 08:15

## 2022-08-15 NOTE — PLAN OF CARE
Problem: Potential for Falls  Goal: Patient will remain free of falls  Description: INTERVENTIONS:  - Educate patient/family on patient safety including physical limitations  - Instruct patient to call for assistance with activity   - Consult OT/PT to assist with strengthening/mobility   - Keep Call bell within reach  - Keep bed low and locked with side rails adjusted as appropriate  - Keep care items and personal belongings within reach  - Initiate and maintain comfort rounds  - Make Fall Risk Sign visible to staff  - Offer Toileting every 2 Hours, in advance of need  - Initiate/Maintain bed alarm  - Obtain necessary fall risk management equipment:   - Apply yellow socks and bracelet for high fall risk patients  - Consider moving patient to room near nurses station  8/15/2022 1332 by Bhumi Bhatti RN  Outcome: Progressing  8/15/2022 1330 by Bhumi Bhatti RN  Outcome: Progressing     Problem: METABOLIC, FLUID AND ELECTROLYTES - ADULT  Goal: Electrolytes maintained within normal limits  Description: INTERVENTIONS:  - Monitor labs and assess patient for signs and symptoms of electrolyte imbalances  - Administer electrolyte replacement as ordered  - Monitor response to electrolyte replacements, including repeat lab results as appropriate  - Instruct patient on fluid and nutrition as appropriate  8/15/2022 1332 by Bhumi Bhatti RN  Outcome: Progressing  8/15/2022 1330 by Bhumi Bhatti RN  Outcome: Progressing     Problem: SKIN/TISSUE INTEGRITY - ADULT  Goal: Skin Integrity remains intact(Skin Breakdown Prevention)  Description: Assess:  -Perform Sanjay assessment  -Clean and moisturize skin  -Inspect skin when repositioning, toileting, and assisting with ADLS  -Assess under medical devices  -Assess extremities for adequate circulation and sensation     Bed Management:  -Have minimal linens on bed & keep smooth, unwrinkled  -Change linens as needed when moist or perspiring  -Avoid sitting or lying in one position for more than 2 hours while in bed  -Keep HOB at 30 degrees     Toileting:  -Offer bedside commode  -Assess for incontinence  -Use incontinent care products after each incontinent episode    Activity:  -Mobilize patient 2 times a day  -Encourage activity and walks on unit  -Encourage or provide ROM exercises   -Turn and reposition patient every 2 Hours  -Use appropriate equipment to lift or move patient in bed  -Instruct/ Assist with weight shifting when out of bed in chair  -Consider limitation of chair time 2 hour intervals    Skin Care:  -Avoid use of baby powder, tape, friction and shearing, hot water or constrictive clothing  -Relieve pressure over bony prominences  -Do not massage red bony areas    Next Steps:  -Teach patient strategies to minimize risks   -Consider consults to  interdisciplinary teams   8/15/2022 1332 by Shanda Bence, RN  Outcome: Progressing  8/15/2022 1330 by Shanda Bence, RN  Outcome: Progressing  Goal: Incision(s), wounds(s) or drain site(s) healing without S/S of infection  Description: INTERVENTIONS  - Assess and document dressing, incision, wound bed, drain sites and surrounding tissue  - Provide patient and family education  - Perform skin care/dressing changes  8/15/2022 1332 by Shanda Bence, RN  Outcome: Progressing  8/15/2022 1330 by Shanda Bence, RN  Outcome: Progressing     Problem: HEMATOLOGIC - ADULT  Goal: Maintains hematologic stability  Description: INTERVENTIONS  - Assess for signs and symptoms of bleeding or hemorrhage  - Monitor labs  - Administer supportive blood products/factors as ordered and appropriate  8/15/2022 1332 by Shanda Bence, RN  Outcome: Progressing  8/15/2022 1330 by Shanda Bence, RN  Outcome: Progressing     Problem: MUSCULOSKELETAL - ADULT  Goal: Maintain or return mobility to safest level of function  Description: INTERVENTIONS:  - Assess patient's ability to carry out ADLs; assess patient's baseline for ADL function and identify physical deficits which impact ability to perform ADLs (bathing, care of mouth/teeth, toileting, grooming, dressing, etc )  - Assess/evaluate cause of self-care deficits   - Assess range of motion  - Assess patient's mobility  - Assess patient's need for assistive devices and provide as appropriate  - Encourage maximum independence but intervene and supervise when necessary  - Involve family in performance of ADLs  - Assess for home care needs following discharge   - Consider OT consult to assist with ADL evaluation and planning for discharge  - Provide patient education as appropriate  8/15/2022 1332 by Helga Vera RN  Outcome: Progressing  8/15/2022 1330 by Helga Vera RN  Outcome: Progressing     Problem: PAIN - ADULT  Goal: Verbalizes/displays adequate comfort level or baseline comfort level  Description: Interventions:  - Encourage patient to monitor pain and request assistance  - Assess pain using appropriate pain scale  - Administer analgesics based on type and severity of pain and evaluate response  - Implement non-pharmacological measures as appropriate and evaluate response  - Consider cultural and social influences on pain and pain management  - Notify physician/advanced practitioner if interventions unsuccessful or patient reports new pain  8/15/2022 1332 by Helga Vera RN  Outcome: Progressing  8/15/2022 1330 by Helga Vera RN  Outcome: Progressing     Problem: INFECTION - ADULT  Goal: Absence or prevention of progression during hospitalization  Description: INTERVENTIONS:  - Assess and monitor for signs and symptoms of infection  - Monitor lab/diagnostic results  - Monitor all insertion sites, i e  indwelling lines, tubes, and drains  - Monitor endotracheal if appropriate and nasal secretions for changes in amount and color  - [place] appropriate cooling/warming therapies per order  - Administer medications as ordered  - Instruct and encourage patient and family to use good hand hygiene technique  - Identify and instruct in appropriate isolation precautions for identified infection/condition  8/15/2022 1332 by Kathie Gutierres RN  Outcome: Progressing  8/15/2022 1330 by Kathie Gutierres RN  Outcome: Progressing     Problem: SAFETY ADULT  Goal: Patient will remain free of falls  Description: INTERVENTIONS:  - Educate patient/family on patient safety including physical limitations  - Instruct patient to call for assistance with activity   - Consult OT/PT to assist with strengthening/mobility   - Keep Call bell within reach  - Keep bed low and locked with side rails adjusted as appropriate  - Keep care items and personal belongings within reach  - Initiate and maintain comfort rounds  - Make Fall Risk Sign visible to staff  - Offer Toileting every 2 Hours, in advance of need  - Initiate/Maintain bed alarm  - Obtain necessary fall risk management equipment:   - Apply yellow socks and bracelet for high fall risk patients  - Consider moving patient to room near nurses station  8/15/2022 1332 by Kathie Gutierres RN  Outcome: Progressing  8/15/2022 1330 by Kathie Gutierres RN  Outcome: Progressing     Problem: DISCHARGE PLANNING  Goal: Discharge to home or other facility with appropriate resources  Description: INTERVENTIONS:  - Identify barriers to discharge w/patient and caregiver  - Arrange for needed discharge resources and transportation as appropriate  - Identify discharge learning needs (meds, wound care, etc )  - Arrange for interpretive services to assist at discharge as needed  - Refer to Case Management Department for coordinating discharge planning if the patient needs post-hospital services based on physician/advanced practitioner order or complex needs related to functional status, cognitive ability, or social support system  8/15/2022 1332 by Kathie Gutierres RN  Outcome: Progressing  8/15/2022 1330 by Kathie Gutierres RN  Outcome: Progressing     Problem: Knowledge Deficit  Goal: Patient/family/caregiver demonstrates understanding of disease process, treatment plan, medications, and discharge instructions  Description: Complete learning assessment and assess knowledge base  Interventions:  - Provide teaching at level of understanding  - Provide teaching via preferred learning methods  8/15/2022 1332 by Ricky Patel RN  Outcome: Progressing  8/15/2022 1330 by Ricky Patel RN  Outcome: Progressing     Problem: Nutrition/Hydration-ADULT  Goal: Nutrient/Hydration intake appropriate for improving, restoring or maintaining nutritional needs  Description: Monitor and assess patient's nutrition/hydration status for malnutrition  Collaborate with interdisciplinary team and initiate plan and interventions as ordered  Monitor patient's weight and dietary intake as ordered or per policy  Utilize nutrition screening tool and intervene as necessary  Determine patient's food preferences and provide high-protein, high-caloric foods as appropriate       INTERVENTIONS:  - Monitor oral intake, urinary output, labs, and treatment plans  - Assess nutrition and hydration status and recommend course of action  - Evaluate amount of meals eaten  - Assist patient with eating if necessary   - Allow adequate time for meals  - Recommend/ encourage appropriate diets, oral nutritional supplements, and vitamin/mineral supplements  - Order, calculate, and assess calorie counts as needed  - Recommend, monitor, and adjust tube feedings and TPN/PPN based on assessed needs  - Assess need for intravenous fluids  - Provide specific nutrition/hydration education as appropriate  - Include patient/family/caregiver in decisions related to nutrition  8/15/2022 1332 by Ricky Patel RN  Outcome: Progressing  8/15/2022 1330 by Ricky Patel RN  Outcome: Progressing     Problem: MOBILITY - ADULT  Goal: Maintain or return to baseline ADL function  Description: INTERVENTIONS:  -  Assess patient's ability to carry out ADLs; assess patient's baseline for ADL function and identify physical deficits which impact ability to perform ADLs (bathing, care of mouth/teeth, toileting, grooming, dressing, etc )  - Assess/evaluate cause of self-care deficits   - Assess range of motion  - Assess patient's mobility; develop plan if impaired  - Assess patient's need for assistive devices and provide as appropriate  - Encourage maximum independence but intervene and supervise when necessary  - Involve family in performance of ADLs  - Assess for home care needs following discharge   - Consider OT consult to assist with ADL evaluation and planning for discharge  - Provide patient education as appropriate  8/15/2022 1332 by Sherrie Burgos RN  Outcome: Progressing  8/15/2022 1330 by Sherrie Burgos RN  Outcome: Progressing  Goal: Maintains/Returns to pre admission functional level  Description: INTERVENTIONS:  - Perform BMAT or MOVE assessment daily    - Set and communicate daily mobility goal to care team and patient/family/caregiver     - Collaborate with rehabilitation services on mobility goals if consulted  - Out of bed for toileting  - Record patient progress and toleration of activity level   8/15/2022 1332 by Sherrie Burgos RN  Outcome: Progressing  8/15/2022 1330 by Sherrie Burgos RN  Outcome: Progressing

## 2022-08-15 NOTE — ASSESSMENT & PLAN NOTE
· Home regimen: losartan-HCTZ 100-25 mg daily, Nifedipine 90 mg daily, propranolol 160 mg daily   · Increased HCTZ to 50 - blood pressure improved  · Suspect pain partly contributing to elevated BP readings

## 2022-08-15 NOTE — DISCHARGE INSTRUCTIONS
Wound care:  Wash wound soap and water prior to bandage application  Silver Alginate/Adaptic dressing to be applied every other day  Continue with antibiotics x1 week  Call if any signs of increasing infection are noted  Call and schedule follow-up in wound care with Dr Gerrie Dandy in 1 or 2 weeks on Wednesday morning    Any problems please call Dr Gerrie Dandy @ [phone]

## 2022-08-15 NOTE — ASSESSMENT & PLAN NOTE
· Met sepsis due to tachycardia, WBC 15 25  Source of infection cellulitis of diabetic ulcer on left foot  R/O osteo   · Lactic WNL     · Procal 0 08   · IV cefepime and IV vancomycin  · BC NGTD  Wound cx growing 2+ Growth of Citrobacter koseri,    4+ Polys Abnormal     1+ Gram positive cocci in pairs     · Discharge home on augmentin x1 week

## 2022-08-15 NOTE — DISCHARGE SUMMARY
[place]     Discharge- Gwendolyn Slight 1955, 74 y o  male MRN: [id number]  Unit/Bed#: -[facility] Encounter: [id number]  Primary Care Provider: Johan Conway DO   Date and time admitted to hospital: 8/9/2022  6:29 PM    * Diabetic ulcer of left foot associated with type 2 diabetes mellitus ([address] )  Assessment & Plan  · Presented due to new ulcer on his inner left foot with swelling and erythema  Saw podiatry outpatient on 8/9 who recommended he go to the ER for osteomyelitis workup  · Denies fevers or chills   · Met sepsis criteria due to tachycardia, WBC 15 25   · Lactic and procalcitonin WNL  · CRP 88 6  · Sed rate 83  · Home regimen: Glipizide 10 mg bid, Admelog bid, jardiance and metformin   · Hold oral hypoglycemics   · SSI   · A1c 6 5  · Xray: No radiographic evidence of osteomyelitis  · MRI:Plantar skin ulceration 1st MTP with subcutaneous edema indicating cellulitis without evidence of drainable abscess collection in this unenhanced study  No evidence of T1 replacement signal to indicate osteomyelitis at this time  · Consult Podiatry   · I&D 8/12  · Medically stable for discharge home on augmentin x 7 days to complete course  · VNA services have been set up by CM prior to discharge    Hypercholesterolemia  Assessment & Plan  · Continue statin     Hypertensive urgency-resolved as of 8/15/2022  Assessment & Plan  · Home regimen: losartan-HCTZ 100-25 mg daily, Nifedipine 90 mg daily, propranolol 160 mg daily   · Increased HCTZ to 50 - blood pressure improved  · Suspect pain partly contributing to elevated BP readings    Sepsis (HCC)-resolved as of 8/15/2022  Assessment & Plan  · Met sepsis due to tachycardia, WBC 15 25  Source of infection cellulitis of diabetic ulcer on left foot  R/O osteo   · Lactic WNL     · Procal 0 08   · IV cefepime and IV vancomycin  · BC NGTD  Wound cx growing 2+ Growth of Citrobacter koseri,    4+ Polys Abnormal     1+ Gram positive cocci in pairs     · Discharge home on augmentin x1 week    Medical Problems             Resolved Problems  Date Reviewed: 8/15/2022          Resolved    Sepsis ([address] ) 8/15/2022     Resolved by  Jagruti Tomas PA-C    Hypertensive urgency 8/15/2022     Resolved by  Jagruti Tomas PA-C              Discharging Physician / Practitioner: Jagruti Tomas PA-C  PCP: Galo Terrazas DO  Admission Date:   Admission Orders (From admission, onward)     Ordered        08/09/22 2039  INPATIENT ADMISSION  Once                      Discharge Date: 08/15/22    Consultations During Hospital Stay:  · Podiatry  · Case management  · PT/OT    Procedures Performed:   · I&D of left foot on 08/12/2022    Significant Findings / Test Results:   · XR left foot:  No acute abnormalities  No osteomyelitis  · MRI foot/forefoot toes left:  Plantar skin ulceration at 1st MTP with subcutaneous edema indicating cellulitis without evidence of drainable abscess collection  No evidence of osteomyelitis  · CT lumbar spine:  Severe multilevel degenerative spondylosis without significant change  No evidence of diskitis/osteomyelitis no definite epidural collection  · Wound culture from admission growing 2+ growth of Citrobacter  · Blood cultures negative x2 after 5 days    Incidental Findings:   · As above     Test Results Pending at Discharge (will require follow up):   · OR culture results to be followed up by Dr Diaz [facility]     Outpatient Tests Requested:  · None    Complications:  None    Reason for Admission:  Diabetic foot ulcer of left foot    Hospital Course:   Samuel Cassidy is a 74 y o  male patient who originally presented to the hospital on 8/9/2022 due to new foot ulcer  Past medical history significant for type 2 diabetes mellitus, hyperlipidemia, hypertension  Patient presented with worsening swelling and erythema of his left foot and noted to have a new diabetic ulcer on the medial aspect    Patient was admitted for broad-spectrum IV antibiotics and Podiatry was consulted  X-ray was negative for osteomyelitis as well as MRI of the foot  However given clinical appearance and suspicion for underlying infection despite IV antibiotics patient underwent incision and drainage on 08/12  Patient continued on broad-spectrum IV antibiotics postop due to mild leukocytosis  CT lumbar spine had been obtained due to reported low back pain  Patient does have known history of chronic back pain  Leukocytosis resolved and patient remained afebrile  Blood cultures were negative x2 after 5 days  Wound cultures grew Citrobacter  Podiatry recommended discharge on Augmentin x1 week and will follow-up OR culture results following discharge  On day of discharge patient was hemodynamically stable and verbalized understanding for requested outpatient follow-up  Case management arrange for VNA services  Please see above list of diagnoses and related plan for additional information  Condition at Discharge: stable    Discharge Day Visit / Exam:   Subjective:  Denies any complaints, eager for discharge home  Vitals: Blood Pressure: 163/76 (08/15/22 1300)  Pulse: 71 (08/15/22 1300)  Temperature: 98 °F (36 7 °C) (08/15/22 0900)  Temp Source: Oral (08/15/22 0900)  Respirations: 15 (08/15/22 1036)  Height: 5' 10" (177 8 cm) (08/09/22 2127)  Weight - Scale: 93 4 kg (206 lb) (08/09/22 2127)  SpO2: 97 % (08/15/22 1036)  Exam:   Physical Exam  Vitals and nursing note reviewed  Constitutional:       Appearance: He is well-developed  Comments: No acute distress   HENT:      Head: Normocephalic and atraumatic  Eyes:      General: No scleral icterus  Extraocular Movements: Extraocular movements intact  Conjunctiva/sclera: Conjunctivae normal    Cardiovascular:      Rate and Rhythm: Normal rate and regular rhythm  Heart sounds: S1 normal and S2 normal  No murmur heard    Pulmonary:      Effort: Pulmonary effort is normal  No respiratory distress  Breath sounds: Normal breath sounds  No wheezing, rhonchi or rales  Abdominal:      General: Bowel sounds are normal       Palpations: Abdomen is soft  Tenderness: There is no abdominal tenderness  There is no guarding or rebound  Musculoskeletal:      Cervical back: Normal range of motion  Comments: Left foot dressing clean/dry/intact  No extremity edema   Skin:     General: Skin is warm and dry  Neurological:      Mental Status: He is alert and oriented to person, place, and time  Psychiatric:         Mood and Affect: Mood normal          Speech: Speech normal          Behavior: Behavior normal           Discussion with Family: Updated  (wife) at bedside  Discharge instructions/Information to patient and family:   See after visit summary for information provided to patient and family  Provisions for Follow-Up Care:  See after visit summary for information related to follow-up care and any pertinent home health orders  Disposition:   Home with VNA Services (Reminder: Complete face to face encounter)    Planned Readmission: None     Discharge Statement:  I spent 60 minutes discharging the patient  This time was spent on the day of discharge  I had direct contact with the patient on the day of discharge  Greater than 50% of the total time was spent examining patient, answering all patient questions, arranging and discussing plan of care with patient as well as directly providing post-discharge instructions  Additional time then spent on discharge activities  Discharge Medications:  See after visit summary for reconciled discharge medications provided to patient and/or family        **Please Note: This note may have been constructed using a voice recognition system**

## 2022-08-15 NOTE — ASSESSMENT & PLAN NOTE
· Presented due to new ulcer on his inner left foot with swelling and erythema  Saw podiatry outpatient on 8/9 who recommended he go to the ER for osteomyelitis workup  · Denies fevers or chills   · Met sepsis criteria due to tachycardia, WBC 15 25   · Lactic and procalcitonin WNL  · CRP 88 6  · Sed rate 83  · Home regimen: Glipizide 10 mg bid, Admelog bid, jardiance and metformin   · Hold oral hypoglycemics   · SSI   · A1c 6 5  · Xray: No radiographic evidence of osteomyelitis  · MRI:Plantar skin ulceration 1st MTP with subcutaneous edema indicating cellulitis without evidence of drainable abscess collection in this unenhanced study  No evidence of T1 replacement signal to indicate osteomyelitis at this time     · Consult Podiatry   · I&D 8/12  · Medically stable for discharge home on augmentin x 7 days to complete course  · VNA services have been set up by [facility] prior to discharge

## 2022-08-15 NOTE — NURSING NOTE
Pt requested stronger laxative as regularly administered Colace and prn Miralax not effective  [facility] provider notified via [employer]

## 2022-08-15 NOTE — PLAN OF CARE
Problem: PHYSICAL THERAPY ADULT  Goal: Performs mobility at highest level of function for planned discharge setting  See evaluation for individualized goals  Description: Treatment/Interventions: Functional transfer training, Elevations, Therapeutic exercise, Endurance training, Equipment eval/education, Bed mobility, Gait training, Spoke to nursing  Equipment Recommended: Etta Hemphill       See flowsheet documentation for full assessment, interventions and recommendations  Outcome: Adequate for Discharge  Note: Prognosis: Good  Problem List: Impaired balance, Decreased mobility, Pain, Obesity, Orthopedic restrictions  Assessment: Patient was received supine in bed and agreeable to session  Heel offloading shoe issued to patient and sized appropriately  Educated patient on how to don/doff shoe  Patient wore a surgical shoe on the RLE to avoid height discrepancy  Patient progressed this session and performed all mobility at a mod I level  He ambulated in the room with a RW without LOB  Stairs were addressed last session without issue  Patient has good family support  At this time, patient has no further inpatient P T  needs  Will discharge orders  Recommending home P T  The patient's AM-PAC Basic Mobility Inpatient Short Form Raw Score is 24  A Raw score of greater than 17 suggests the patient may benefit from discharge to home  Please also refer to the recommendation of the Physical Therapist for safe discharge planning  Barriers to Discharge: None  Barriers to Discharge Comments: Performed stairs last session  PT Discharge Recommendation: Home with home health rehabilitation    See flowsheet documentation for full assessment

## 2022-08-15 NOTE — CASE MANAGEMENT
Case Management Discharge Planning Note    Patient name Julieta Sheikh  Location [id number] MRN [id number]  : 1955 Date 8/15/2022       Current Admission Date: 2022  Current Admission Diagnosis:Diabetic ulcer of left foot associated with type 2 diabetes mellitus [facility])   Patient Active Problem List    Diagnosis Date Noted    Liver disease 08/10/2022    Diabetic ulcer of left foot associated with type 2 diabetes mellitus ([address] ) 2022    Sepsis ([address] ) 2022    Hypertensive urgency 2022    Bronchitis, mucopurulent recurrent ([address] ) 2022    Cirrhosis, alcoholic ([facility] ) 3733    Diabetic peripheral neuropathy ([facility] [address] ) 2022    Herniated nucleus pulposus of lumbosacral region 2022    Thyroid cancer ([facility] [address] ) 2021    Alcoholism in remission ([facility] [address] ) 2021    Benzodiazepine dependence ([facility] [address] ) 2021    Bilateral adhesive capsulitis of shoulders 2021    DM (diabetes mellitus) ([facility] [address] ) 2021    Hypercholesterolemia 2021    Lumbar spondylosis 2021      LOS (days): 6  Geometric Mean LOS (GMLOS) (days): 3 50  Days to GMLOS:-2 1     OBJECTIVE:  Risk of Unplanned Readmission Score: 17 17         Current admission status: Inpatient   Preferred Pharmacy:   [facility] [address], [address] [facility]   [address]   [address] [place] [address]  Phone: [phone] Fax: [phone]    Primary Care Provider: Saud Humphreys DO    Primary Insurance: MEDICARE  Secondary Insurance: [facility]    DISCHARGE DETAILS:       IMM Given (Date):: 08/15/22  IMM Given to[de-identified] Patient  IMM reviewed with patient, patient agrees with discharge determination  Additional Comments: CM was informed that pt is for dc today  CM informed [facility] via [facility] of dc today  Pt's wife will transport home

## 2022-08-15 NOTE — PROGRESS NOTES
Progress Note - Podiatry  Steven Olivas 74 y o  male MRN: [id number]  Unit/Bed#: -01 Encounter: [id number]    Assessment/Plan:  Cellulitis/abscess left foot/sepsis  · Continue with current IV antibiotics  · X-rays show no destructive changes suggesting osteomyelitis  · Elevated CRP and ESR 83 remain suspicious for underlying bone infection  · MRI  shows no underlying osteomyelitis  · WBC 15 25 upon admission, 9 82 today  · Ok for D/C on PO antibiotics  · Will require VNA to do bandage changes every other day, patient does not think he is capable wife is hesitant      Diabetic Skin ulceration left forefoot with fat layer exposed  · POD #3 S/P I&D LFT ulceration with abscess  · Dry sterile dressing with silver alginate applied left foot  · Wound culture obtained 8/10/2022 grew Citrobacter koseri  · OR cultures prelim Gram Neg Rods in broth only  · Follow-up at [address]  in 1 week     DM2 with neuropathy, Hypertensive urgency, hypercholesterolemia  · Managed per Internal Medicine  · Recommend patient continue with regular diabetic foot care  · Will require new diabetic shoes in the near future        Subjective/Objective   Chief Complaint:   Chief Complaint   Patient presents with    Wound Infection     Pt reports being diabetic and two days ago noticed swelling and redness on a boil on his inner left foot  Denies fevers  Subjective:  37-year-old white male seen resting comfortably in bed with wife at bedside  Anxious to go home today  Denies any new or acute pedal concerns  Denies any pain from his left foot overnight  Denies any shortness of breath  Blood pressure (!) 188/91, pulse 66, temperature 98 °F (36 7 °C), temperature source Oral, resp  rate 15, height 5' 10" (1 778 m), weight 93 4 kg (206 lb), SpO2 97 %  ,Body mass index is 29 56 kg/m²  Invasive Devices  Report    Peripheral Intravenous Line  Duration           Peripheral IV 08/13/22 Dorsal (posterior); Left Hand 1 day Physical Exam:   General appearance: alert, cooperative and no distress  Neuro/Vascular: Normal strength  Sensation and reflexes:  Diminished epicritic sensations with loss of protective sensation consistent with advanced diabetic neuropathy  Pulses: Right: DP 1/4, PT 0/4, Left: DP 1/4, PT 0/4  Capillary Filling:  3 Sec, Edema:  +1 left forefoot  Ulcers/Wounds:  Left foot sub 1st MPJ full-thickness ulcerative breakdown S/P I&D/Debridement  continues to have diminishing erythema, no calor noted, and edema has resolved  Wound bed appears pink and healthy with bleeding edges  No active necrosis noted  No purulent drainage noted, only minimal/mild serosanguineous drainage within bandage noted  Labs and other studies:   CBC w/diff  Results from last 7 days   Lab Units 08/15/22  0440   WBC Thousand/uL 9 82   HEMOGLOBIN g/dL 13 2   HEMATOCRIT % 43 1   PLATELETS Thousands/uL 410*   NEUTROS PCT % 60   LYMPHS PCT % 27   MONOS PCT % 8   EOS PCT % 3     BMP  Results from last 7 days   Lab Units 08/15/22  0440   POTASSIUM mmol/L 3 8   CHLORIDE mmol/L 95*   CO2 mmol/L 28   BUN mg/dL 16   CREATININE mg/dL 1 04   CALCIUM mg/dL 10 0     CMP  Results from last 7 days   Lab Units 08/15/22  0440 08/10/22  0456 08/09/22  1907   POTASSIUM mmol/L 3 8   < > 3 5   CHLORIDE mmol/L 95*   < > 93*   CO2 mmol/L 28   < > 33*   BUN mg/dL 16   < > 7   CREATININE mg/dL 1 04   < > 0 91   CALCIUM mg/dL 10 0   < > 9 5   ALK PHOS U/L  --   --  107   ALT U/L  --   --  15   AST U/L  --   --  25    < > = values in this interval not displayed  @Culture@  Lab Results   Component Value Date    BLOODCX No Growth After 5 Days  08/09/2022    BLOODCX No Growth After 5 Days   08/09/2022     Lab Results   Component Value Date    WOUNDCULT 2+ Growth of Citrobacter koseri (A) 08/10/2022    WOUNDCULT 2+ Growth of  08/10/2022         Current Facility-Administered Medications:     acetaminophen (TYLENOL) tablet 650 mg, 650 mg, Oral, Q6H PRN, Lupillokailash ABDIRASHID Cee-C, 650 mg at 08/12/22 0404    aspirin (ECOTRIN LOW STRENGTH) EC tablet 81 mg, 81 mg, Oral, Daily, Andres ABDIRASHID Cee-C, 81 mg at 08/15/22 0815    cefepime (MAXIPIME) IVPB (premix in dextrose) 2,000 mg 50 mL, 2,000 mg, Intravenous, Q12H, Andres Cee PA-C, Last Rate: 100 mL/hr at 08/15/22 0817, 2,000 mg at 08/15/22 0817    clonazePAM (KlonoPIN) tablet 1 mg, 1 mg, Oral, BID PRN, ABDIRASHID Heredia-DEANNA, 1 mg at 08/15/22 0520    cyclobenzaprine (FLEXERIL) tablet 10 mg, 10 mg, Oral, HS PRN, ABDIRASHID Heredia-C, 10 mg at 08/12/22 0234    docusate sodium (COLACE) capsule 100 mg, 100 mg, Oral, BID, ABDIRASHID Heredia-C, 100 mg at 08/15/22 0815    DULoxetine (CYMBALTA) delayed release capsule 60 mg, 60 mg, Oral, BID, ABDIRASHID Heredia-C, 60 mg at 08/15/22 0815    fluticasone (FLONASE) 50 mcg/act nasal spray 2 spray, 2 spray, Each Nare, Daily, ABDIRASHID Heredia-C, 2 spray at 76/75/04 8157    folic acid (FOLVITE) tablet 2 mg, 2 mg, Oral, Daily, ABDIRASHID Heredia-C, 2 mg at 08/15/22 0815    heparin (porcine) subcutaneous injection 5,000 Units, 5,000 Units, Subcutaneous, Q8H [facility], 5,000 Units at 08/15/22 0514 **AND** [CANCELED] Platelet count, , , Once, Dotty DANIEL Hernández    hydrALAZINE (APRESOLINE) injection 5 mg, 5 mg, Intravenous, Q6H PRN, ABDIRASHID Heredia-C, 5 mg at 08/14/22 0859    hydrochlorothiazide (HYDRODIURIL) tablet 50 mg, 50 mg, Oral, Daily, Aury Burrell MD, 50 mg at 08/15/22 0816    insulin glargine (LANTUS) subcutaneous injection 37 Units 0 37 mL, 37 Units, Subcutaneous, HS, Aury Burrell MD    insulin lispro (HumaLOG) 100 units/mL subcutaneous injection 1-6 Units, 1-6 Units, Subcutaneous, TID AC, 3 Units at 08/15/22 1110 **AND** Fingerstick Glucose (POCT), , , TID AC, Andres Cee PA-C    insulin lispro (HumaLOG) 100 units/mL subcutaneous injection 1-6 Units, 1-6 Units, Subcutaneous, HS, Andres Cee PA-C, 2 Units at 08/13/22 2305    labetalol (NORMODYNE) injection 10 mg, 10 mg, Intravenous, Q6H PRN, Stephanie Garzon MD, 10 mg at 08/14/22 1002    lidocaine (LIDODERM) 5 % patch 1 patch, 1 patch, Topical, HS, Joanie Prasad PA-C, 1 patch at 08/14/22 2200    loratadine (CLARITIN) tablet 10 mg, 10 mg, Oral, Daily, Joanie Prasad PA-C, 10 mg at 08/15/22 0815    LORazepam (ATIVAN) injection 1 mg, 1 mg, Intravenous, Q6H PRN, Stephanie Garzon MD, 1 mg at 08/14/22 1002    losartan (COZAAR) tablet 100 mg, 100 mg, Oral, Daily, Joanie Prasad PA-C, 100 mg at 08/15/22 0815    mirtazapine (REMERON) tablet 45 mg, 45 mg, Oral, HS, Joanie Prasad PA-C, 45 mg at 08/14/22 2200    neomycin-polymyxin B (NEOSPORIN-) 1 mL in sodium chloride 0 9 % 1,000 mL irrigation, , Irrigation, Once, Joanie Prasad PA-C    NIFEdipine (PROCARDIA XL) 24 hr tablet 120 mg, 120 mg, Oral, Daily, Joanie Prasad PA-C, 120 mg at 08/15/22 0815    ondansetron (ZOFRAN) injection 4 mg, 4 mg, Intravenous, Q6H PRN, Joanie Prasad PA-C, 4 mg at 08/14/22 0618    ondansetron (ZOFRAN) injection 4 mg, 4 mg, Intravenous, Once PRN, More Simmons CRNA    oxyCODONE (ROXICODONE) IR tablet 5 mg, 5 mg, Oral, Q6H PRN, Stephanie Garzon MD, 5 mg at 08/15/22 1109    polyethylene glycol (MIRALAX) packet 17 g, 17 g, Oral, Daily PRN, Stephanie Garzon MD, 17 g at 08/15/22 0514    pravastatin (PRAVACHOL) tablet 20 mg, 20 mg, Oral, Daily With Juna Chow PA-C, 20 mg at 08/14/22 1716    propranolol (INDERAL LA) 24 hr capsule 160 mg, 160 mg, Oral, Daily, Joanie Prasad PA-C, 160 mg at 08/15/22 0816    senna (SENOKOT) tablet 17 2 mg, 2 tablet, Oral, BID, Stephanie Garzon MD, 17 2 mg at 08/15/22 2291    sertraline (ZOLOFT) tablet 75 mg, 75 mg, Oral, Daily, Joanie Prasad PA-C, 75 mg at 08/15/22 0815    traMADol (ULTRAM) tablet 50 mg, 50 mg, Oral, Q6H PRN, Joanie Prasad PA-C, 50 mg at 08/14/22 0435    vancomycin (VANCOCIN) 1,250 mg in sodium chloride 0 9 % 250 mL IVPB, 1,250 mg, Intravenous, Q12H, Yosef Bond MD, Last Rate: 166 7 mL/hr at 08/15/22 0521, 1,250 mg at 08/15/22 5332    Imaging: I have personally reviewed pertinent films in PACS  EKG, Pathology, and Other Studies: I have personally reviewed pertinent reports    VTE Pharmacologic Prophylaxis: Heparin  VTE Mechanical Prophylaxis: sequential compression device    Cliff Gutierrez DPM

## 2022-08-15 NOTE — PLAN OF CARE
Problem: Potential for Falls  Goal: Patient will remain free of falls  Description: INTERVENTIONS:  - Educate patient/family on patient safety including physical limitations  - Instruct patient to call for assistance with activity   - Consult OT/PT to assist with strengthening/mobility   - Keep Call bell within reach  - Keep bed low and locked with side rails adjusted as appropriate  - Keep care items and personal belongings within reach  - Initiate and maintain comfort rounds  - Make Fall Risk Sign visible to staff  - Offer Toileting every 2 Hours, in advance of need  - Initiate/Maintain bed alarm  - Obtain necessary fall risk management equipment:   - Apply yellow socks and bracelet for high fall risk patients  - Consider moving patient to room near nurses station  8/15/2022 1533 by Lou Guidry RN  Outcome: Adequate for Discharge  8/15/2022 1332 by Lou Guidry RN  Outcome: Progressing  8/15/2022 1330 by Lou Guidry RN  Outcome: Progressing     Problem: METABOLIC, FLUID AND ELECTROLYTES - ADULT  Goal: Electrolytes maintained within normal limits  Description: INTERVENTIONS:  - Monitor labs and assess patient for signs and symptoms of electrolyte imbalances  - Administer electrolyte replacement as ordered  - Monitor response to electrolyte replacements, including repeat lab results as appropriate  - Instruct patient on fluid and nutrition as appropriate  8/15/2022 1533 by Lou Guidry RN  Outcome: Adequate for Discharge  8/15/2022 1332 by Lou Guidry RN  Outcome: Progressing  8/15/2022 1330 by Lou Guidry RN  Outcome: Progressing     Problem: SKIN/TISSUE INTEGRITY - ADULT  Goal: Skin Integrity remains intact(Skin Breakdown Prevention)  Description: Assess:  -Perform Sanjay assessment  -Clean and moisturize skin  -Inspect skin when repositioning, toileting, and assisting with ADLS  -Assess under medical devices  -Assess extremities for adequate circulation and sensation     Bed Management:  -Have minimal linens on bed & keep smooth, unwrinkled  -Change linens as needed when moist or perspiring  -Avoid sitting or lying in one position for more than 2 hours while in bed  -Keep HOB at 30 degrees     Toileting:  -Offer bedside commode  -Assess for incontinence  -Use incontinent care products after each incontinent episode    Activity:  -Mobilize patient 2 times a day  -Encourage activity and walks on unit  -Encourage or provide ROM exercises   -Turn and reposition patient every 2 Hours  -Use appropriate equipment to lift or move patient in bed  -Instruct/ Assist with weight shifting when out of bed in chair  -Consider limitation of chair time 2 hour intervals    Skin Care:  -Avoid use of baby powder, tape, friction and shearing, hot water or constrictive clothing  -Relieve pressure over bony prominences  -Do not massage red bony areas    Next Steps:  -Teach patient strategies to minimize risks   -Consider consults to  interdisciplinary teams   8/15/2022 1533 by Leora Espinal RN  Outcome: Adequate for Discharge  8/15/2022 1332 by Leora Espinal RN  Outcome: Progressing  8/15/2022 1330 by Leora Espinal RN  Outcome: Progressing  Goal: Incision(s), wounds(s) or drain site(s) healing without S/S of infection  Description: INTERVENTIONS  - Assess and document dressing, incision, wound bed, drain sites and surrounding tissue  - Provide patient and family education  - Perform skin care/dressing changes  8/15/2022 1533 by Leora sEpinal RN  Outcome: Adequate for Discharge  8/15/2022 1332 by Leora Espinal RN  Outcome: Progressing  8/15/2022 1330 by Leora Espinal RN  Outcome: Progressing     Problem: HEMATOLOGIC - ADULT  Goal: Maintains hematologic stability  Description: INTERVENTIONS  - Assess for signs and symptoms of bleeding or hemorrhage  - Monitor labs  - Administer supportive blood products/factors as ordered and appropriate  8/15/2022 1533 by Leora Espinal RN  Outcome: Adequate for Discharge  8/15/2022 1332 by Tank Simmons RN  Outcome: Progressing  8/15/2022 1330 by Tank Simmons RN  Outcome: Progressing     Problem: MUSCULOSKELETAL - ADULT  Goal: Maintain or return mobility to safest level of function  Description: INTERVENTIONS:  - Assess patient's ability to carry out ADLs; assess patient's baseline for ADL function and identify physical deficits which impact ability to perform ADLs (bathing, care of mouth/teeth, toileting, grooming, dressing, etc )  - Assess/evaluate cause of self-care deficits   - Assess range of motion  - Assess patient's mobility  - Assess patient's need for assistive devices and provide as appropriate  - Encourage maximum independence but intervene and supervise when necessary  - Involve family in performance of ADLs  - Assess for home care needs following discharge   - Consider OT consult to assist with ADL evaluation and planning for discharge  - Provide patient education as appropriate  8/15/2022 1533 by Tank Simmons RN  Outcome: Adequate for Discharge  8/15/2022 1332 by Tank Simmons RN  Outcome: Progressing  8/15/2022 1330 by Tank Simmons RN  Outcome: Progressing     Problem: PAIN - ADULT  Goal: Verbalizes/displays adequate comfort level or baseline comfort level  Description: Interventions:  - Encourage patient to monitor pain and request assistance  - Assess pain using appropriate pain scale  - Administer analgesics based on type and severity of pain and evaluate response  - Implement non-pharmacological measures as appropriate and evaluate response  - Consider cultural and social influences on pain and pain management  - Notify physician/advanced practitioner if interventions unsuccessful or patient reports new pain  8/15/2022 1533 by Tank Simmons RN  Outcome: Adequate for Discharge  8/15/2022 1332 by Tank Simmons RN  Outcome: Progressing  8/15/2022 1330 by Tank Simmons RN  Outcome: Progressing     Problem: INFECTION - ADULT  Goal: Absence or prevention of progression during hospitalization  Description: INTERVENTIONS:  - Assess and monitor for signs and symptoms of infection  - Monitor lab/diagnostic results  - Monitor all insertion sites, i e  indwelling lines, tubes, and drains  - Monitor endotracheal if appropriate and nasal secretions for changes in amount and color  - [place] appropriate cooling/warming therapies per order  - Administer medications as ordered  - Instruct and encourage patient and family to use good hand hygiene technique  - Identify and instruct in appropriate isolation precautions for identified infection/condition  8/15/2022 1533 by Chase Marie RN  Outcome: Adequate for Discharge  8/15/2022 1332 by Chase Marie RN  Outcome: Progressing  8/15/2022 1330 by Chase Marie RN  Outcome: Progressing     Problem: SAFETY ADULT  Goal: Patient will remain free of falls  Description: INTERVENTIONS:  - Educate patient/family on patient safety including physical limitations  - Instruct patient to call for assistance with activity   - Consult OT/PT to assist with strengthening/mobility   - Keep Call bell within reach  - Keep bed low and locked with side rails adjusted as appropriate  - Keep care items and personal belongings within reach  - Initiate and maintain comfort rounds  - Make Fall Risk Sign visible to staff  - Offer Toileting every 2 Hours, in advance of need  - Initiate/Maintain bed alarm  - Obtain necessary fall risk management equipment:   - Apply yellow socks and bracelet for high fall risk patients  - Consider moving patient to room near nurses station  8/15/2022 1533 by Chase Marie RN  Outcome: Adequate for Discharge  8/15/2022 1332 by Chase Marie RN  Outcome: Progressing  8/15/2022 1330 by Chase Marie RN  Outcome: Progressing     Problem: DISCHARGE PLANNING  Goal: Discharge to home or other facility with appropriate resources  Description: INTERVENTIONS:  - Identify barriers to discharge w/patient and caregiver  - Arrange for needed discharge resources and transportation as appropriate  - Identify discharge learning needs (meds, wound care, etc )  - Arrange for interpretive services to assist at discharge as needed  - Refer to Case Management Department for coordinating discharge planning if the patient needs post-hospital services based on physician/advanced practitioner order or complex needs related to functional status, cognitive ability, or social support system  8/15/2022 1533 by Bhumi Bhatti RN  Outcome: Adequate for Discharge  8/15/2022 1332 by Bhumi Bhatti RN  Outcome: Progressing  8/15/2022 1330 by Bhumi Bhatti RN  Outcome: Progressing     Problem: Knowledge Deficit  Goal: Patient/family/caregiver demonstrates understanding of disease process, treatment plan, medications, and discharge instructions  Description: Complete learning assessment and assess knowledge base  Interventions:  - Provide teaching at level of understanding  - Provide teaching via preferred learning methods  8/15/2022 1533 by Bhumi Bhatti RN  Outcome: Adequate for Discharge  8/15/2022 1332 by Bhumi Bhatti RN  Outcome: Progressing  8/15/2022 1330 by Bhumi Bhatti RN  Outcome: Progressing     Problem: Nutrition/Hydration-ADULT  Goal: Nutrient/Hydration intake appropriate for improving, restoring or maintaining nutritional needs  Description: Monitor and assess patient's nutrition/hydration status for malnutrition  Collaborate with interdisciplinary team and initiate plan and interventions as ordered  Monitor patient's weight and dietary intake as ordered or per policy  Utilize nutrition screening tool and intervene as necessary  Determine patient's food preferences and provide high-protein, high-caloric foods as appropriate       INTERVENTIONS:  - Monitor oral intake, urinary output, labs, and treatment plans  - Assess nutrition and hydration status and recommend course of action  - Evaluate amount of meals eaten  - Assist patient with eating if necessary - Allow adequate time for meals  - Recommend/ encourage appropriate diets, oral nutritional supplements, and vitamin/mineral supplements  - Order, calculate, and assess calorie counts as needed  - Recommend, monitor, and adjust tube feedings and TPN/PPN based on assessed needs  - Assess need for intravenous fluids  - Provide specific nutrition/hydration education as appropriate  - Include patient/family/caregiver in decisions related to nutrition  8/15/2022 1533 by Marilu Pruitt RN  Outcome: Adequate for Discharge  8/15/2022 1332 by Marilu Pruitt RN  Outcome: Progressing  8/15/2022 1330 by Marilu Pruitt RN  Outcome: Progressing     Problem: MOBILITY - ADULT  Goal: Maintain or return to baseline ADL function  Description: INTERVENTIONS:  -  Assess patient's ability to carry out ADLs; assess patient's baseline for ADL function and identify physical deficits which impact ability to perform ADLs (bathing, care of mouth/teeth, toileting, grooming, dressing, etc )  - Assess/evaluate cause of self-care deficits   - Assess range of motion  - Assess patient's mobility; develop plan if impaired  - Assess patient's need for assistive devices and provide as appropriate  - Encourage maximum independence but intervene and supervise when necessary  - Involve family in performance of ADLs  - Assess for home care needs following discharge   - Consider OT consult to assist with ADL evaluation and planning for discharge  - Provide patient education as appropriate  8/15/2022 1533 by Marilu Pruitt RN  Outcome: Adequate for Discharge  8/15/2022 1332 by Marilu Pruitt RN  Outcome: Progressing  8/15/2022 1330 by Marliu Pruitt RN  Outcome: Progressing  Goal: Maintains/Returns to pre admission functional level  Description: INTERVENTIONS:  - Perform BMAT or MOVE assessment daily    - Set and communicate daily mobility goal to care team and patient/family/caregiver     - Collaborate with rehabilitation services on mobility goals if consulted  - Out of bed for toileting  - Record patient progress and toleration of activity level   8/15/2022 1533 by Isadora Green RN  Outcome: Adequate for Discharge  8/15/2022 1332 by Isadora Green RN  Outcome: Progressing  8/15/2022 1330 by Isadora Green RN  Outcome: Progressing

## 2022-08-15 NOTE — PHYSICAL THERAPY NOTE
PHYSICAL THERAPY NOTE       08/15/22 1208   PT Last Visit   PT Visit Date 08/15/22   Note Type   Note Type Treatment   Pain Assessment   Pain Assessment Tool 0-10   Pain Score 10 - Worst Possible Pain   Pain Location/Orientation Orientation: Lower; Location: Back   Hospital Pain Intervention(s) Medication (See MAR); Repositioned   Restrictions/Precautions   Weight Bearing Precautions Per Order Yes   LLE Weight Bearing Per Order PWB   Braces or Orthoses   (Forefoot offloading shoe)   Other Precautions Pain; Fall Risk;WBS   General   Chart Reviewed Yes   Additional Pertinent History POD #2 S/P I&D LFT ulceration with abscess  Response to Previous Treatment Patient with no complaints from previous session  Family/Caregiver Present Yes   Cognition   Overall Cognitive Status WFL   Arousal/Participation Alert   Attention Within functional limits   Orientation Level Oriented X4   Memory Within functional limits   Following Commands Follows all commands and directions without difficulty   Subjective   Subjective " I am hoping to go home today "   Bed Mobility   Supine to Sit 6  Modified independent   Additional items Verbal cues   Transfers   Sit to Stand 6  Modified independent   Additional items Armrests   Stand to Sit 6  Modified independent   Additional items Armrests   Additional Comments While in seated position, educated on how to don/doff offloading shoe     Ambulation/Elevation   Gait pattern Step to;Short stride   Gait Assistance 6  Modified independent   Additional items Verbal cues   Assistive Device Rolling walker   Distance 50ft   Stair Management Assistance Not tested   Balance   Static Sitting Normal   Dynamic Sitting Normal   Static Standing Good   Dynamic Standing Good   Ambulatory Good   Endurance Deficit   Endurance Deficit No   Activity Tolerance   Activity Tolerance Patient tolerated treatment well   Nurse Made Aware RN, Rodney   Assessment   Prognosis Good   Problem List Impaired balance;Decreased mobility;Pain;Obesity;Orthopedic restrictions   Assessment Patient was received supine in bed and agreeable to session  Heel offloading shoe issued to patient and sized appropriately  Educated patient on how to don/doff shoe  Patient wore a surgical shoe on the RLE to avoid height discrepancy  Patient progressed this session and performed all mobility at a mod I level  He ambulated in the room with a RW without LOB  Stairs were addressed last session without issue  Patient has good family support  At this time, patient has no further inpatient P T  needs  Will discharge orders  Recommending home P T  The patient's AM-[facility] Basic Mobility Inpatient Short Form Raw Score is 24  A Raw score of greater than 17 suggests the patient may benefit from discharge to home  Please also refer to the recommendation of the Physical Therapist for safe discharge planning  Barriers to Discharge None   Barriers to Discharge Comments Performed stairs last session  Goals   Patient Goals To go home today   Plan   Treatment/Interventions   (D/C P T )   Progress Progressing toward goals   Recommendation   PT Discharge Recommendation Home with home health rehabilitation   Additional Comments RW has been issued  Sized RW today for patient     AM-PAC Basic Mobility Inpatient   Turning in Bed Without Bedrails 4   Lying on Back to Sitting on Edge of Flat Bed 4   Moving Bed to Chair 4   Standing Up From Chair 4   Walk in Room 4   Climb 3-5 Stairs 4   Basic Mobility Inpatient Raw Score 24   Basic Mobility Standardized Score 57 68   Highest Level Of Mobility   JH-HLM Goal 8: Walk 250 feet or more   JH-HLM Achieved 7: Walk 25 feet or more   Education   Education Provided Mobility training;Assistive device  (Educated on how to don/doff off loading shoe and on proper use of RW)   Patient Demonstrates acceptance/verbal understanding   End of Consult   Patient Position at End of Consult Bedside chair; All needs within reach   Soha Page, PT            Patient Name: Salome Meadows  PIPPA Date: 8/15/2022

## 2022-08-15 NOTE — DISCHARGE INSTR - AVS FIRST PAGE
Wound care:  Wash wound soap and water prior to bandage application  Silver Alginate/Adaptic dressing to be applied every other day  Continue with antibiotics x1 week  Call if any signs of increasing infection are noted  Call and schedule follow-up in wound care with Dr Gerrie Dandy in 1 or 2 weeks on Wednesday morning  Any problems please call Dr Gerrie Dandy @ [phone]    Follow up with PCP within 1 week for post hospitalization follow up  Check your BP at least daily and keep a log of the readings  Should your pressures be persistently > 170 (top number) please call your PCP

## 2022-08-15 NOTE — NURSING NOTE
Pt often awake during hrly rounds overnight; early this am pt took PRN pain med for back ache and PRN Klonopin for c/o anxiety and PRN Miralax for c/o constipation; then pt dozed throughout rest of early morning

## 2022-08-16 ENCOUNTER — OFFICE VISIT (OUTPATIENT)
Dept: PHYSICAL THERAPY | Facility: CLINIC | Age: 67
End: 2022-08-16
Payer: MEDICARE

## 2022-08-16 DIAGNOSIS — M25.512 CHRONIC LEFT SHOULDER PAIN: Primary | ICD-10-CM

## 2022-08-16 DIAGNOSIS — G89.29 CHRONIC LEFT SHOULDER PAIN: Primary | ICD-10-CM

## 2022-08-16 DIAGNOSIS — Z96.612 STATUS POST TOTAL REPLACEMENT OF LEFT SHOULDER: ICD-10-CM

## 2022-08-16 LAB
BACTERIA SPEC ANAEROBE CULT: NORMAL
BACTERIA TISS AEROBE CULT: ABNORMAL
GRAM STN SPEC: ABNORMAL
GRAM STN SPEC: ABNORMAL

## 2022-08-16 PROCEDURE — 97140 MANUAL THERAPY 1/> REGIONS: CPT

## 2022-08-16 NOTE — PROGRESS NOTES
Daily Note     Today's date: 2022  Patient name: Davy Oviedo  : 1955  MRN: [id number]  Referring provider: Katheryn Worley MD  Dx:   Encounter Diagnosis     ICD-10-CM    1  Chronic left shoulder pain  M25 512     G89 29    2  Status post total replacement of left shoulder  Z96 612                   Subjective: Pt reports his shoulder has been feeling much better the last week or two, feels he has turned a corner with his shoulder  Objective: See treatment diary below      Assessment: Pt arrives more than 15 minutes late for session so session was modified  Worked on improving PROM with stretching due to tightness after a 6 night hospital stay due to infection and he was unable to stretch  Also worked on [employer] and MRE's with good tolerance  Pt was tightness but improved with stretching  Showing large improvement in overall strength  Plan: Continue per plan of care  Focus on supine flexion  Precautions: s/p L TSA  Other factors: FALLS RISK  Pt goals:  EPOC: 6/10/22  F/u with referring:      HEP:  Access Code: [email or web address]  URL: [email or web address]  Date: 2022  Prepared by: erawindy Bash    Exercises  · Supine Shoulder External Rotation with Dowel - 20 reps  · Supine Shoulder Flexion Extension AAROM with Dowel - 20 reps  · Standing Shoulder Abduction AAROM with Dowel - 20 reps            Manuals    L shoulder PROM PF WE WE      WE JK   L UT and cervical STM PF WE WE      WE JK   Rhythmic stab PF WE WE      WE    MRE's   WE          L shoulder/bicep IASTM         WE    Re-assess              ' 25' 25'       15'   Neuro Re-Ed             scap retraction          Prone 20x5"   Prone squeeze             AA shld flex Supine 10x2 AROM  Supine  x30  AROM  Supine  x30        10x2                                                       Ther Ex             pendulums             pulleys 3'/3' 3'/3'        3'/3'   Dowel sh' flex             Dowel sh' ER             Dowel sh' abd             Sh' iso flex/ext, abd/add, er/ir             SA Wall slides 10x with AA        x20    arom sh' flex Seated 10x Seated 10x       x20 AA supine 10x2   arom sh' ext             arom sh' ER/IR         GTB  x20 Supine GTB 20   arom sh' scap             arom sh' abd          PTB supine 20x   Bent over Rows  7 5#  KB  x30           Bent Over Ext  5#  KB  x30           SL ER  x60           SL ABD and H ABD  x30 x30 ea       AA scap 10x2   TB Rows         CC 55#  x20 CC 55#  x20   TB Ext         CC 55#  x20 CC 55#  x20   Belly press isometrics             Ther Activity             UBE         3'/3' 3'/3'                Gait Training                                       Modalities             Cp prn

## 2022-08-17 ENCOUNTER — HOME CARE VISIT (OUTPATIENT)
Dept: HOME HEALTH SERVICES | Facility: HOME HEALTHCARE | Age: 67
End: 2022-08-17
Payer: MEDICARE

## 2022-08-17 VITALS
TEMPERATURE: 96.3 F | DIASTOLIC BLOOD PRESSURE: 70 MMHG | HEART RATE: 78 BPM | SYSTOLIC BLOOD PRESSURE: 122 MMHG | OXYGEN SATURATION: 98 %

## 2022-08-17 VITALS — HEART RATE: 72 BPM | DIASTOLIC BLOOD PRESSURE: 66 MMHG | OXYGEN SATURATION: 97 % | SYSTOLIC BLOOD PRESSURE: 120 MMHG

## 2022-08-17 LAB
DME PARACHUTE DELIVERY DATE ACTUAL: NORMAL
DME PARACHUTE DELIVERY DATE REQUESTED: NORMAL
DME PARACHUTE ITEM DESCRIPTION: NORMAL
DME PARACHUTE ITEM DESCRIPTION: NORMAL
DME PARACHUTE ORDER STATUS: NORMAL
DME PARACHUTE SUPPLIER NAME: NORMAL
DME PARACHUTE SUPPLIER PHONE: NORMAL

## 2022-08-17 PROCEDURE — G0151 HHCP-SERV OF PT,EA 15 MIN: HCPCS

## 2022-08-17 PROCEDURE — G0299 HHS/HOSPICE OF RN EA 15 MIN: HCPCS

## 2022-08-17 PROCEDURE — 400013 VN SOC

## 2022-08-17 PROCEDURE — 10330081 VN NO-PAY CLAIM PROCEDURE

## 2022-08-17 NOTE — CASE COMMUNICATION
[facility] has admitted your patient to [address] [place] [address] service with the following disciplines:      SN, PT and OT  This report is informational only, no responses is needed  Primary focus of home health care wound care   Patient stated goals of care for wound to heal   Anticipated visit pattern and next visit date 8 19 22 4w9w every other day wound care  See medication list - meds all in the home   Significant clinical findingsna   Pot ential barriers to goal achievement - none  Other pertinent information- na     Thank you for allowing us to participate in the care of your patient

## 2022-08-17 NOTE — CASE COMMUNICATION
Ship to Clinician    Branch: [employer]  mc    Wound 1- left foot diabetic ulcer    1x1 8x0 3   Full     All items are ordered by the each unless otherwise noted  PLEASE DO NOT ORDER BY THE BOX  Request specific quantity needed  For Private Insurances order should be for a 2 week period      Cleansers    Saline 100ml [id number] (Not covered by Private Ins)-1      Dry Dressings   (Nonsterile gauze not covered by private insurance)   (Sterile gauze may be requested for insurance rather than nonsterile gauze)    Gauze 4x4 N/S 200 4x4s per unit  [id number] -1   Gauze Kerlix (fluff roll) 4inch sterile 463099-0      Tape    Tape   Mefix 2inch [id number] -1      Calcium Alginates  (In place of Aquacel or Maxsorb)    Alginate with Silver [id number] -1

## 2022-08-17 NOTE — CASE COMMUNICATION
Ship to   Home     Branch: [employer]  mc    Wound 1 left foot diabetic ulcer 1x1 8x0 3      Full     All items are ordered by the each unless otherwise noted    PLEASE DO NOT ORDER BY THE BOX  Request specific quantity needed  For Private Insurances order should be for a 2 week period        [email or web address] [id number] -1      Dry Dressings   (Nonsterile gauze not covered by private insurance)  (Sterile gauze may be requeste d for insurance rather than nonsterile gauze)    Gauze Luiza stretch roll 4inch n/s 12 rolls per unit  [id number] -1    ABD 5x9 282114 -10          Moist Wound Products    Gauze oil emulsion 483316 -2        Miscellaneous Wound Products     4in Ace 567844 -2

## 2022-08-18 ENCOUNTER — APPOINTMENT (OUTPATIENT)
Dept: PHYSICAL THERAPY | Facility: CLINIC | Age: 67
End: 2022-08-18
Payer: MEDICARE

## 2022-08-18 PROCEDURE — 10330064 CLEANSER, WND SEA-CLEANS 6OZ  COLPLT

## 2022-08-18 PROCEDURE — 10330064 BANDAGE, CNFRM 4"X4.1YDS N/S LF (12RL/BG

## 2022-08-18 PROCEDURE — 10330064 DRESSING, OIL EMULSION 3"X3" STR (50/BX

## 2022-08-18 PROCEDURE — 10330064 PAD, ABD 5X9" STR LF (1/PK 20PK/BX) MGM1

## 2022-08-18 PROCEDURE — 10330064 BANDAGE, ELAS SLF-CLSR PREM N/S LF 4X5YD

## 2022-08-19 ENCOUNTER — HOME CARE VISIT (OUTPATIENT)
Dept: HOME HEALTH SERVICES | Facility: HOME HEALTHCARE | Age: 67
End: 2022-08-19
Payer: MEDICARE

## 2022-08-19 VITALS
SYSTOLIC BLOOD PRESSURE: 132 MMHG | TEMPERATURE: 97.5 F | HEART RATE: 84 BPM | OXYGEN SATURATION: 98 % | DIASTOLIC BLOOD PRESSURE: 68 MMHG | RESPIRATION RATE: 16 BRPM

## 2022-08-19 PROCEDURE — G0157 HHC PT ASSISTANT EA 15: HCPCS

## 2022-08-19 PROCEDURE — G0299 HHS/HOSPICE OF RN EA 15 MIN: HCPCS

## 2022-08-20 PROCEDURE — G0180 MD CERTIFICATION HHA PATIENT: HCPCS | Performed by: STUDENT IN AN ORGANIZED HEALTH CARE EDUCATION/TRAINING PROGRAM

## 2022-08-21 ENCOUNTER — HOME CARE VISIT (OUTPATIENT)
Dept: HOME HEALTH SERVICES | Facility: HOME HEALTHCARE | Age: 67
End: 2022-08-21
Payer: MEDICARE

## 2022-08-21 VITALS
OXYGEN SATURATION: 98 % | TEMPERATURE: 97 F | RESPIRATION RATE: 18 BRPM | DIASTOLIC BLOOD PRESSURE: 68 MMHG | SYSTOLIC BLOOD PRESSURE: 128 MMHG | HEART RATE: 78 BPM

## 2022-08-21 PROCEDURE — G0299 HHS/HOSPICE OF RN EA 15 MIN: HCPCS

## 2022-08-22 ENCOUNTER — HOME CARE VISIT (OUTPATIENT)
Dept: HOME HEALTH SERVICES | Facility: HOME HEALTHCARE | Age: 67
End: 2022-08-22
Payer: MEDICARE

## 2022-08-22 VITALS
SYSTOLIC BLOOD PRESSURE: 132 MMHG | HEART RATE: 81 BPM | DIASTOLIC BLOOD PRESSURE: 84 MMHG | TEMPERATURE: 96.9 F | OXYGEN SATURATION: 98 %

## 2022-08-23 ENCOUNTER — APPOINTMENT (OUTPATIENT)
Dept: PHYSICAL THERAPY | Facility: CLINIC | Age: 67
End: 2022-08-23
Payer: MEDICARE

## 2022-08-23 ENCOUNTER — HOME CARE VISIT (OUTPATIENT)
Dept: HOME HEALTH SERVICES | Facility: HOME HEALTHCARE | Age: 67
End: 2022-08-23
Payer: MEDICARE

## 2022-08-23 VITALS
RESPIRATION RATE: 15 BRPM | SYSTOLIC BLOOD PRESSURE: 125 MMHG | HEART RATE: 78 BPM | OXYGEN SATURATION: 97 % | DIASTOLIC BLOOD PRESSURE: 70 MMHG

## 2022-08-23 PROCEDURE — G0299 HHS/HOSPICE OF RN EA 15 MIN: HCPCS

## 2022-08-24 ENCOUNTER — HOME CARE VISIT (OUTPATIENT)
Dept: HOME HEALTH SERVICES | Facility: HOME HEALTHCARE | Age: 67
End: 2022-08-24
Payer: MEDICARE

## 2022-08-24 VITALS — SYSTOLIC BLOOD PRESSURE: 144 MMHG | OXYGEN SATURATION: 97 % | HEART RATE: 79 BPM | DIASTOLIC BLOOD PRESSURE: 80 MMHG

## 2022-08-24 PROCEDURE — G0152 HHCP-SERV OF OT,EA 15 MIN: HCPCS

## 2022-08-24 NOTE — CASE COMMUNICATION
OT referral received and evaluation administered 8/24/22  Pt presents with limited AROM in L shoulder, impaired stability on feet, min impaired safety awareness, decreased I in LB dressing and environmental barriers  Pt to benefit from skilled OT to address functional deficits for improved I, efficiency, and safety performing ADLs/IADLs  Pt agreeable to OT POC of 2 x a week for 2 weeks and 1 x a week the 3rd week

## 2022-08-25 ENCOUNTER — APPOINTMENT (OUTPATIENT)
Dept: PHYSICAL THERAPY | Facility: CLINIC | Age: 67
End: 2022-08-25
Payer: MEDICARE

## 2022-08-25 ENCOUNTER — HOME CARE VISIT (OUTPATIENT)
Dept: HOME HEALTH SERVICES | Facility: HOME HEALTHCARE | Age: 67
End: 2022-08-25
Payer: MEDICARE

## 2022-08-25 VITALS
SYSTOLIC BLOOD PRESSURE: 135 MMHG | RESPIRATION RATE: 16 BRPM | DIASTOLIC BLOOD PRESSURE: 80 MMHG | HEART RATE: 82 BPM | OXYGEN SATURATION: 98 %

## 2022-08-25 PROCEDURE — G0299 HHS/HOSPICE OF RN EA 15 MIN: HCPCS

## 2022-08-25 PROCEDURE — G0157 HHC PT ASSISTANT EA 15: HCPCS

## 2022-08-26 ENCOUNTER — APPOINTMENT (OUTPATIENT)
Dept: RADIOLOGY | Facility: CLINIC | Age: 67
End: 2022-08-26
Payer: MEDICARE

## 2022-08-26 ENCOUNTER — HOME CARE VISIT (OUTPATIENT)
Dept: HOME HEALTH SERVICES | Facility: HOME HEALTHCARE | Age: 67
End: 2022-08-26
Payer: MEDICARE

## 2022-08-26 VITALS — SYSTOLIC BLOOD PRESSURE: 142 MMHG | HEART RATE: 82 BPM | OXYGEN SATURATION: 95 % | DIASTOLIC BLOOD PRESSURE: 89 MMHG

## 2022-08-26 VITALS
OXYGEN SATURATION: 94 % | TEMPERATURE: 97.3 F | HEART RATE: 75 BPM | SYSTOLIC BLOOD PRESSURE: 120 MMHG | DIASTOLIC BLOOD PRESSURE: 78 MMHG | RESPIRATION RATE: 18 BRPM

## 2022-08-26 DIAGNOSIS — G89.4 CHRONIC PAIN SYNDROME: ICD-10-CM

## 2022-08-26 PROCEDURE — 72110 X-RAY EXAM L-2 SPINE 4/>VWS: CPT

## 2022-08-26 PROCEDURE — G0152 HHCP-SERV OF OT,EA 15 MIN: HCPCS

## 2022-08-26 PROCEDURE — 72070 X-RAY EXAM THORAC SPINE 2VWS: CPT

## 2022-08-27 ENCOUNTER — HOME CARE VISIT (OUTPATIENT)
Dept: HOME HEALTH SERVICES | Facility: HOME HEALTHCARE | Age: 67
End: 2022-08-27
Payer: MEDICARE

## 2022-08-27 VITALS
SYSTOLIC BLOOD PRESSURE: 146 MMHG | RESPIRATION RATE: 20 BRPM | HEART RATE: 80 BPM | TEMPERATURE: 97.8 F | DIASTOLIC BLOOD PRESSURE: 86 MMHG | OXYGEN SATURATION: 97 %

## 2022-08-27 PROCEDURE — G0299 HHS/HOSPICE OF RN EA 15 MIN: HCPCS

## 2022-08-27 NOTE — CASE COMMUNICATION
Offered patient a saturday visit during his thursday visit however he declined second visit for this week  TC to MD office to notify that patient was not seen for second visit this week and therefore out of compliance with visit christy

## 2022-08-29 ENCOUNTER — HOME CARE VISIT (OUTPATIENT)
Dept: HOME HEALTH SERVICES | Facility: HOME HEALTHCARE | Age: 67
End: 2022-08-29
Payer: MEDICARE

## 2022-08-29 VITALS — SYSTOLIC BLOOD PRESSURE: 144 MMHG | DIASTOLIC BLOOD PRESSURE: 76 MMHG | HEART RATE: 85 BPM | OXYGEN SATURATION: 96 %

## 2022-08-29 VITALS
HEART RATE: 80 BPM | DIASTOLIC BLOOD PRESSURE: 85 MMHG | SYSTOLIC BLOOD PRESSURE: 135 MMHG | RESPIRATION RATE: 15 BRPM | OXYGEN SATURATION: 7 %

## 2022-08-29 PROCEDURE — G0152 HHCP-SERV OF OT,EA 15 MIN: HCPCS

## 2022-08-29 PROCEDURE — G0299 HHS/HOSPICE OF RN EA 15 MIN: HCPCS

## 2022-08-29 NOTE — CASE COMMUNICATION
Pt agreeable to dc this date after OT interventions touched on each goal  Pt reported he has not been performing his UB HEP, and will wait until his foot is healed to get into the shower  No further skilled OT necessary for in the home at this time

## 2022-08-30 ENCOUNTER — HOME CARE VISIT (OUTPATIENT)
Dept: HOME HEALTH SERVICES | Facility: HOME HEALTHCARE | Age: 67
End: 2022-08-30
Payer: MEDICARE

## 2022-08-30 ENCOUNTER — APPOINTMENT (OUTPATIENT)
Dept: PHYSICAL THERAPY | Facility: CLINIC | Age: 67
End: 2022-08-30
Payer: MEDICARE

## 2022-08-30 PROCEDURE — G0151 HHCP-SERV OF PT,EA 15 MIN: HCPCS

## 2022-08-31 ENCOUNTER — OFFICE VISIT (OUTPATIENT)
Dept: WOUND CARE | Facility: HOSPITAL | Age: 67
End: 2022-08-31
Payer: MEDICARE

## 2022-08-31 ENCOUNTER — HOME CARE VISIT (OUTPATIENT)
Dept: HOME HEALTH SERVICES | Facility: HOME HEALTHCARE | Age: 67
End: 2022-08-31
Payer: MEDICARE

## 2022-08-31 VITALS
SYSTOLIC BLOOD PRESSURE: 148 MMHG | HEIGHT: 69 IN | RESPIRATION RATE: 20 BRPM | DIASTOLIC BLOOD PRESSURE: 86 MMHG | TEMPERATURE: 96.6 F | BODY MASS INDEX: 30.36 KG/M2 | HEART RATE: 84 BPM | WEIGHT: 205 LBS

## 2022-08-31 VITALS — HEART RATE: 76 BPM | OXYGEN SATURATION: 98 % | DIASTOLIC BLOOD PRESSURE: 72 MMHG | SYSTOLIC BLOOD PRESSURE: 116 MMHG

## 2022-08-31 DIAGNOSIS — E11.621 DIABETIC ULCER OF OTHER PART OF LEFT FOOT ASSOCIATED WITH TYPE 2 DIABETES MELLITUS, WITH FAT LAYER EXPOSED (HCC): Primary | ICD-10-CM

## 2022-08-31 DIAGNOSIS — L03.116 CELLULITIS OF LEFT FOOT: ICD-10-CM

## 2022-08-31 DIAGNOSIS — E11.42 DIABETIC PERIPHERAL NEUROPATHY (HCC): ICD-10-CM

## 2022-08-31 DIAGNOSIS — L97.522 DIABETIC ULCER OF OTHER PART OF LEFT FOOT ASSOCIATED WITH TYPE 2 DIABETES MELLITUS, WITH FAT LAYER EXPOSED (HCC): Primary | ICD-10-CM

## 2022-08-31 PROCEDURE — 10330064

## 2022-08-31 PROCEDURE — 99213 OFFICE O/P EST LOW 20 MIN: CPT | Performed by: PODIATRIST

## 2022-08-31 PROCEDURE — 11042 DBRDMT SUBQ TIS 1ST 20SQCM/<: CPT | Performed by: PODIATRIST

## 2022-08-31 RX ORDER — LORAZEPAM 1 MG/1
1 TABLET ORAL 2 TIMES DAILY
COMMUNITY

## 2022-08-31 NOTE — CASE COMMUNICATION
Ship to xx Pt  Home   Insurance [facility]     Wound 1 Left foot      Full X        All items are ordered by the each unless otherwise noted    PLEASE DO NOT ORDER BY THE BOX  Request specific quantity needed  For Private Insurances order should be for a 2 week period    Sub for Puracol:  Dermacol  NOT STOCKED  [id number]   6

## 2022-08-31 NOTE — PROGRESS NOTES
Patient ID: Marta Duncan is a 74 y o  male Date of Birth 1955     Chief Complaint  Chief Complaint   Patient presents with    New Patient Visit     Left foot surgical wound, from surgery on 8/12/2022  Allergies  Cephalexin, Abilify [aripiprazole], and Molds & smuts    Assessment:    No problem-specific Assessment & Plan notes found for this encounter  {Assess/PlanSmartLinks:67111}          Procedures    Plan:  · Finish antibiotics as prescribed  · Rx mupirocin  · Puracol/mupirocin dressing to be changed every other day  · Follow-up 2 weeks  · Continue with forefoot offloading wedge shoe  Wound 08/31/22 Diabetic Ulcer Foot Left;Plantar;Medial (Active)   Wound Image Images linked 08/31/22 0841   Wound Description Pink;Slough 08/31/22 0840   Emely-wound Assessment Callus 08/31/22 0840   Wound Length (cm) 0 3 cm 08/31/22 0840   Wound Width (cm) 1 7 cm 08/31/22 0840   Wound Depth (cm) 0 3 cm 08/31/22 0840   Wound Surface Area (cm^2) 0 51 cm^2 08/31/22 0840   Wound Volume (cm^3) 0 153 cm^3 08/31/22 0840   Calculated Wound Volume (cm^3) 0 15 cm^3 08/31/22 0840   Drainage Amount Small 08/31/22 0840   Drainage Description Yellow 08/31/22 0840   Non-staged Wound Description Full thickness 08/31/22 0840   Dressing Status Intact 08/31/22 0840       Wound 08/31/22 Diabetic Ulcer Foot Left;Plantar;Medial (Active)   Date First Assessed/Time First Assessed: 08/31/22 0839   Pre-Existing Wound: Yes  Primary Wound Type: Diabetic Ulcer  Location: Foot  Wound Location Orientation: Left;Plantar;Medial       [REMOVED] Wound 08/12/22 Foot Left (Removed)   Resolved Date/Resolved Time: 08/31/22 1214  Date First Assessed/Time First Assessed: 08/12/22 1827   Location: Foot  Wound Location Orientation: Left  Wound Description (Comments): 12"  of !//4" iodoform gauze placed in [id number]'sKlingAce Bandag    Subjective:            HPI    {Common ambulatory SmartLinks:88618}    Review of Systems      Objective: Wound 08/31/22 Diabetic Ulcer Foot Left;Plantar;Medial (Active)   Wound Image Images linked 08/31/22 0841   Wound Description Pink;Slough 08/31/22 0840   Emely-wound Assessment Callus 08/31/22 0840   Wound Length (cm) 0 3 cm 08/31/22 0840   Wound Width (cm) 1 7 cm 08/31/22 0840   Wound Depth (cm) 0 3 cm 08/31/22 0840   Wound Surface Area (cm^2) 0 51 cm^2 08/31/22 0840   Wound Volume (cm^3) 0 153 cm^3 08/31/22 0840   Calculated Wound Volume (cm^3) 0 15 cm^3 08/31/22 0840   Drainage Amount Small 08/31/22 0840   Drainage Description Yellow 08/31/22 0840   Non-staged Wound Description Full thickness 08/31/22 0840   Dressing Status Intact 08/31/22 0840       /86   Pulse 84   Temp (!) 96 6 °F (35 9 °C)   Resp 20   Ht 5' 9" (1 753 m)   Wt 93 kg (205 lb)   BMI 30 27 kg/m²     Physical Exam      Wound Instructions:  Orders Placed This Encounter   Procedures    Wound cleansing and dressings     Left foot wound    Wash your hands with soap and water  Remove old dressing, discard into plastic bag and place in trash  Cleanse the wound with saline prior to applying a clean dressing  Do not use tissue or cotton balls  Do not scrub the wound  Pat dry using gauze  Shower yes     Apply mupericin and puracol  to the open wound  Cover with gauze  Secure with tubifast blue  Change dressing every other day  Done today  Return in two weeks     Standing Status:   Future     Standing Expiration Date:   8/31/2023        Diagnosis ICD-10-CM Associated Orders   1   Diabetic ulcer of other part of left foot associated with type 2 diabetes mellitus, with fat layer exposed ([facility] )  L38 260 Wound cleansing and dressings    L96 522 Constitutional: Negative for chills and fever  HENT: Negative for ear pain and sore throat  Eyes: Negative for pain and visual disturbance  Respiratory: Negative for cough and shortness of breath  Cardiovascular: Negative for chest pain and palpitations  Gastrointestinal: Negative for abdominal pain and vomiting  Genitourinary: Negative for dysuria and hematuria  Musculoskeletal: Positive for gait problem  Negative for arthralgias and back pain  Skin: Positive for wound (Diabetic foot ulcer left great toe status post incision drainage)  Negative for color change and rash  Neurological: Positive for numbness  Negative for seizures and syncope  All other systems reviewed and are negative  Objective:       Wound 08/31/22 Diabetic Ulcer Foot Left;Plantar;Medial (Active)   Wound Image Images linked 08/31/22 0841   Wound Description Pink;Slough 08/31/22 0840   Emely-wound Assessment Callus 08/31/22 0840   Wound Length (cm) 0 3 cm 08/31/22 0840   Wound Width (cm) 1 7 cm 08/31/22 0840   Wound Depth (cm) 0 3 cm 08/31/22 0840   Wound Surface Area (cm^2) 0 51 cm^2 08/31/22 0840   Wound Volume (cm^3) 0 153 cm^3 08/31/22 0840   Calculated Wound Volume (cm^3) 0 15 cm^3 08/31/22 0840   Drainage Amount Small 08/31/22 0840   Drainage Description Yellow 08/31/22 0840   Non-staged Wound Description Full thickness 08/31/22 0840   Dressing Status Intact 08/31/22 0840             /86   Pulse 84   Temp (!) 96 6 °F (35 9 °C)   Resp 20   Ht 5' 9" (1 753 m)   Wt 93 kg (205 lb)   BMI 30 27 kg/m²     Physical Exam  Constitutional:       Appearance: Normal appearance  HENT:      Head: Normocephalic  Right Ear: Tympanic membrane normal       Left Ear: Tympanic membrane normal       Nose: No congestion  Mouth/Throat:      Mouth: Mucous membranes are moist       Pharynx: No oropharyngeal exudate or posterior oropharyngeal erythema     Eyes:      Extraocular Movements: Extraocular movements intact  Conjunctiva/sclera: Conjunctivae normal       Pupils: Pupils are equal, round, and reactive to light  Cardiovascular:      Rate and Rhythm: Normal rate and regular rhythm  Pulses: Normal pulses  Pulmonary:      Effort: Pulmonary effort is normal    Abdominal:      Palpations: Abdomen is soft  Feet:      Right foot:      Protective Sensation: 10 sites tested  1 site sensed  Left foot:      Protective Sensation: 10 sites tested  1 site sensed  Skin integrity: Ulcer (Left foot: Subcutaneous depth ulcerative breakdown plantar medial aspect, evidence of resolving infection with superficial skin slough  Deeper area that remains subcutaneous in depth P) present  Skin:     General: Skin is warm and dry  Capillary Refill: Capillary refill takes 2 to 3 seconds  Coloration: Skin is not pale  Findings: No bruising, erythema, lesion or rash  Neurological:      General: No focal deficit present  Mental Status: He is alert  Cranial Nerves: No cranial nerve deficit  Sensory: No sensory deficit  Motor: No weakness  Gait: Gait normal       Deep Tendon Reflexes: Reflexes normal    Psychiatric:         Mood and Affect: Mood normal          Behavior: Behavior normal          Judgment: Judgment normal            Wound Instructions:  Orders Placed This Encounter   Procedures    Wound cleansing and dressings     Left foot wound    Wash your hands with soap and water  Remove old dressing, discard into plastic bag and place in trash  Cleanse the wound with saline prior to applying a clean dressing  Do not use tissue or cotton balls  Do not scrub the wound  Pat dry using gauze  Shower yes     Apply mupericin and puracol  to the open wound    Cover with gauze  Secure with tubifast blue  Change dressing every other day  Done today  Return in two weeks     Standing Status:   Future     Standing Expiration Date:   8/31/2023        Diagnosis ICD-10-CM Associated Orders 1  Diabetic ulcer of other part of left foot associated with type 2 diabetes mellitus, with fat layer exposed ([facility] )  D60 814 Wound cleansing and dressings    I12 092

## 2022-08-31 NOTE — PATIENT INSTRUCTIONS
Orders Placed This Encounter   Procedures    Wound cleansing and dressings     Left foot wound    Wash your hands with soap and water  Remove old dressing, discard into plastic bag and place in trash  Cleanse the wound with saline prior to applying a clean dressing  Do not use tissue or cotton balls  Do not scrub the wound  Pat dry using gauze  Shower yes     Apply mupericin and puracol  to the open wound    Cover with gauze  Secure with tubifast blue  Change dressing every other day  Done today  Return in two weeks     Standing Status:   Future     Standing Expiration Date:   8/31/2023

## 2022-09-01 ENCOUNTER — OFFICE VISIT (OUTPATIENT)
Dept: PAIN MEDICINE | Facility: CLINIC | Age: 67
End: 2022-09-01
Payer: MEDICARE

## 2022-09-01 ENCOUNTER — HOME CARE VISIT (OUTPATIENT)
Dept: HOME HEALTH SERVICES | Facility: HOME HEALTHCARE | Age: 67
End: 2022-09-01
Payer: MEDICARE

## 2022-09-01 VITALS
BODY MASS INDEX: 30.27 KG/M2 | HEIGHT: 69 IN | TEMPERATURE: 98.3 F | SYSTOLIC BLOOD PRESSURE: 125 MMHG | DIASTOLIC BLOOD PRESSURE: 80 MMHG

## 2022-09-01 VITALS — SYSTOLIC BLOOD PRESSURE: 124 MMHG | DIASTOLIC BLOOD PRESSURE: 76 MMHG | HEART RATE: 76 BPM | OXYGEN SATURATION: 98 %

## 2022-09-01 DIAGNOSIS — M54.50 CHRONIC BILATERAL LOW BACK PAIN, UNSPECIFIED WHETHER SCIATICA PRESENT: ICD-10-CM

## 2022-09-01 DIAGNOSIS — G89.29 CHRONIC BILATERAL LOW BACK PAIN, UNSPECIFIED WHETHER SCIATICA PRESENT: ICD-10-CM

## 2022-09-01 DIAGNOSIS — M47.816 LUMBAR SPONDYLOSIS: ICD-10-CM

## 2022-09-01 DIAGNOSIS — G89.4 CHRONIC PAIN SYNDROME: ICD-10-CM

## 2022-09-01 DIAGNOSIS — M54.16 LUMBAR RADICULOPATHY: Primary | ICD-10-CM

## 2022-09-01 DIAGNOSIS — M48.061 SPINAL STENOSIS OF LUMBAR REGION, UNSPECIFIED WHETHER NEUROGENIC CLAUDICATION PRESENT: ICD-10-CM

## 2022-09-01 PROCEDURE — G0151 HHCP-SERV OF PT,EA 15 MIN: HCPCS

## 2022-09-01 PROCEDURE — 99214 OFFICE O/P EST MOD 30 MIN: CPT | Performed by: NURSE PRACTITIONER

## 2022-09-01 RX ORDER — PREGABALIN 50 MG/1
50 CAPSULE ORAL DAILY
Qty: 30 CAPSULE | Refills: 0 | Status: SHIPPED | OUTPATIENT
Start: 2022-09-01 | End: 2022-09-15 | Stop reason: SDUPTHER

## 2022-09-01 NOTE — PROGRESS NOTES
Assessment:  1  Chronic pain syndrome    2  Chronic bilateral low back pain, unspecified whether sciatica present    3  Lumbar spondylosis    4  Spinal stenosis of lumbar region, unspecified whether neurogenic claudication present        Plan:  The patient is a 74 y o  male last seen on 08/09/2022 who presents for a follow up office visit in regards to chronic pain secondary to low back pain, lumbar spondylosis, lumbar disc herniation and spinal stenosis  Patient presents today with ongoing low back pain  He is experiencing increased pain on the right side of his low back which is consistent with sacroiliitis  He is wearing a boot on the left foot, which is affecting his gait  Unfortunately due to his open wound, he is not a candidate for epidural steroid injections at this time  I recommended Voltaren gel to help decrease inflammation and pain  The patient has seen pain management in the past and has undergone multiple injections with little pain relief  He is interested in spinal cord stimulation  He is scheduled for an MRI of the thoracic spine as well as a psychological evaluation  He was made aware that once these are complete we will wait for insurance authorization and then our office will contact him to schedule the trial   Patient is aware that his wound needs to be healed in order for him to move forward with the spinal cord stimulator trial     His family member was asking for pain medication for him to take prior to undergoing the trial   Patient has a history of alcoholism as well as anxiety with panic attacks, and depression  He is currently being followed at [facility]  I would refrain from opioid use as it can worsen depression and cause relapse of his alcoholism  To help with the pain I will start him on Lyrica 50 mg 1 tablet daily  I instructed him to take the medication every day in order for it to be effective    He was also aware that it can take 2 weeks to reach the full effects of the medication  Side effects were reviewed which include drowsiness, dizziness, swelling in the hands and feet, and blurred vision  If side effects occur, he is instructed to contact the office  I also asked that he contact the office in 2 weeks with an update on his pain  If not obtaining adequate pain relief, and having no side effects, I will increase the dose further  A prescription for Lyrica 50 mg was electronically sent to the pharmacy  The patient will follow-up once MRI T spine and psychological evaluation is completed    The patient was advised to contact the office should their symptoms worsen in the interim  The patient was agreeable and verbalized an understanding  History of Present Illness: The patient is a 74 y o  male last seen on 08/09/2022 who presents for a follow up office visit in regards to chronic pain secondary to low back pain, lumbar spondylosis, lumbar disc herniation and spinal stenosis  The patients last office visit was July 29, 2022, in which spinal cord stimulation was discussed  Patient presents today with ongoing low back pain  He states the pain remains unchanged since the last office visit  It continues across the low back  He has noticed an increase in pain on the right side of his low back that radiates into his right hip and groin  His pain is constant described as burning, dull aching, sharp, throbbing, cramping, pressure-like, shooting, numbness, and pins and needles  He is rating his pain a 9/10 on the numeric rating scale  The patient was admitted to the hospital on August 9, 2022 for diabetic ulcer/cellulitis in the left foot  He recently completed Augmentin  He sees wound care regularly  He continues to express interest in spinal cord stimulation  He is scheduled for an MRI of the thoracic spine as well as a psychological evaluation      I have personally reviewed and/or updated the patient's past medical history, past surgical history, family history, social history, current medications, allergies, and vital signs today  Review of Systems:    Review of Systems      Past Medical History:   Diagnosis Date    Anxiety     Arthritis     Cancer [facility] [address] [facility]     Depression     Diabetes mellitus [facility] [address] [facility]     Hypertension     Liver disease     Mitral valve prolapse     Thyroid disease        Past Surgical History:   Procedure Laterality Date    INCISION AND DRAINAGE OF WOUND Left 8/12/2022    Procedure: INCISION AND DRAINAGE (I&D) EXTREMITY;  Surgeon: Lorna Solis DPM;  Location:  MAIN OR;  Service: Podiatry    JOINT REPLACEMENT Left 03/11/2022    Left TSA    THYROID SURGERY  2021    remove cancer       No family history on file      Social History     Occupational History    Not on file   Tobacco Use    Smoking status: Former Smoker     Packs/day: 0 25    Smokeless tobacco: Never Used    Tobacco comment: quit 1981   Vaping Use    Vaping Use: Never used   Substance and Sexual Activity    Alcohol use: Not Currently     Comment: has not drank since january 2022    Drug use: Not Currently     Types: Marijuana    Sexual activity: Not Currently         Current Outpatient Medications:     ACCU-CHEK FABIANO PLUS test strip, 4 (four) times a day, Disp: , Rfl: 1    ACCU-CHEK FASTCLIX LANCETS MISC, 4 (four) times a day, Disp: , Rfl: 1    cetirizine (ZyrTEC) 10 mg tablet, Take 10 mg by mouth daily, Disp: , Rfl: 2    Cholecalciferol 1 25 MG (48571 UT) TABS, Take 1 tablet by mouth daily, Disp: , Rfl:     clonazePAM (KlonoPIN) 1 mg tablet, 2 (two) times a day as needed for anxiety (Patient not taking: Reported on 8/31/2022), Disp: , Rfl: 4    cyclobenzaprine (FLEXERIL) 10 mg tablet, Take 10 mg by mouth daily at bedtime as needed for muscle spasms, Disp: , Rfl: 1    docusate sodium (COLACE) 100 mg capsule, Take 1 capsule (100 mg total) by mouth 2 (two) times a day, Disp: 14 capsule, Rfl: 0    DULoxetine (CYMBALTA) 60 mg delayed release capsule, Take 60 mg by mouth 2 (two) times a day (Patient not taking: No sig reported), Disp: , Rfl:     fluticasone (FLONASE) 50 mcg/act nasal spray, 2 sprays daily As directed, Disp: , Rfl: 1    folic acid (FOLVITE) 1 mg tablet, Take 2,000 mcg by mouth daily, Disp: , Rfl: 6    glipiZIDE (GLUCOTROL XL) 10 mg 24 hr tablet, Take 10 mg by mouth 2 (two) times a day (Patient not taking: Reported on 8/31/2022), Disp: , Rfl: 3    GLOBAL INJECT EASE INSULIN SYR 31G X 5/16" 1 ML MISC, 2 (two) times a day, Disp: , Rfl: 0    GNP ASPIRIN LOW DOSE 81 MG EC tablet, Take 81 mg by mouth daily, Disp: , Rfl: 0    hydrOXYzine pamoate (VISTARIL) 25 mg capsule, Take 50 mg by mouth daily at bedtime, Disp: , Rfl:     ibuprofen (MOTRIN) 400 mg tablet, Take 800 mg by mouth 3 (three) times a day, Disp: , Rfl: 1    Jardiance 10 MG TABS, Take 10 mg by mouth daily, Disp: , Rfl:     ketoconazole (NIZORAL) 2 % shampoo, daily Use as directed, Disp: , Rfl: 6    Lantus SoloStar 100 units/mL SOPN, Inject 37 mg under the skin 2 (two) times a day with meals, Disp: , Rfl:     LORazepam (ATIVAN) 1 mg tablet, Take 1 mg by mouth 2 (two) times a day, Disp: , Rfl:     losartan-hydrochlorothiazide (HYZAAR) 100-25 MG per tablet, Take 1 tablet by mouth daily, Disp: , Rfl: 0    lovastatin (MEVACOR) 20 mg tablet, Take 20 mg by mouth daily, Disp: , Rfl: 3    metFORMIN (GLUCOPHAGE) 1000 MG tablet, , Disp: , Rfl:     mirtazapine (REMERON) 45 MG tablet, Take 45 mg by mouth daily at bedtime, Disp: , Rfl: 1    mupirocin (BACTROBAN) 2 % ointment, Apply topically every other day With bandage change, Disp: 22 g, Rfl: 1    NIFEdipine (PROCARDIA XL) 30 mg 24 hr tablet, TAKE 1 TABLET BY MOUTH DAILY in addition TO 90mg FOR A total OF 120mg DAILY, Disp: , Rfl:     NIFEdipine (PROCARDIA XL) 90 mg 24 hr tablet, Take 90 mg by mouth daily, Disp: , Rfl: 3    polyethylene glycol (MIRALAX) 17 g packet, Take 17 g by mouth daily as needed (constipation), Disp: 10 each, Rfl: 0    propranolol (INDERAL LA) 160 mg, Take 160 mg by mouth daily, Disp: , Rfl: 3    senna (SENOKOT) 8 6 mg, Take 2 tablets (17 2 mg total) by mouth 2 (two) times a day, Disp: 28 tablet, Rfl: 0    sertraline (ZOLOFT) 25 mg tablet, Take 100 mg by mouth daily, Disp: , Rfl:     Allergies   Allergen Reactions    Cephalexin Rash    Abilify [Aripiprazole] Other (See Comments)     Shaking      Molds & Smuts        Physical Exam:    There were no vitals taken for this visit  Constitutional:normal, well developed, well nourished, alert, in no distress and non-toxic and no overt pain behavior  Eyes:anicteric  HEENT:grossly intact  Neck:supple, symmetric, trachea midline and no masses   Pulmonary:even and unlabored  Cardiovascular:No edema or pitting edema present  Skin:Normal without rashes or lesions and well hydrated  Psychiatric:Mood and affect appropriate  Neurologic:Cranial Nerves II-XII grossly intact  Musculoskeletal:normal      Imaging    CT LUMBAR SPINE     INDICATION:   Low back pain, immunocompromised  increasing back pain      COMPARISON: CT abdomen/pelvis dated 4/29/2022      TECHNIQUE:  Contiguous axial images through the lumbar spine were obtained  Sagittal and coronal reconstructions were performed        Radiation dose length product (DLP) for this visit:  644 2 mGy-cm   This examination, like all CT scans performed in the [facility], was performed utilizing techniques to minimize radiation dose exposure, including the use of iterative   reconstruction and automated exposure control        IMAGE QUALITY:  Diagnostic      FINDINGS:     ALIGNMENT:  There are 5 lumbar type vertebral bodies  Normal alignment of the lumbar spine  No spondylolisthesis or spondylolysis      VERTEBRAL BODIES:  No fracture  No lytic or blastic lesion      DEGENERATIVE CHANGES:     Lower Thoracic spine:  No disc herniation   Facet arthropathy and T11-T12 and T12-L1 with mild or moderate foraminal stenosis     Disc spaces: Diffuse disc space narrowing with endplate degenerative changes, prominent Schmorl's nodes and vacuum disc phenomenon is unchanged except for diminished gas in the L1-2 disc space       L1-2:  Disc bulge and facet arthropathy is unchanged  Small amount of degenerative ventral epidural nitrogen gas is again seen in the left lateral recess  Moderate to severe canal stenosis and severe bilateral foraminal stenosis  No significant change      2-3:  Disc bulge and facet arthropathy  At least moderate canal stenosis and moderate severe foraminal stenosis  No significant change        L3-4:  Disc bulge asymmetric to the right and facet arthropathy  Moderate canal stenosis  Lateral recess stenosis  Moderate foraminal stenosis  No significant change      L4-5:  Disc bulge and marginal osteophyte  Facet arthropathy  Severe canal stenosis  Lateral recess stenosis  Severe right and moderate to severe left foraminal stenosis  No significant change      L5-S1:  Disc bulge  Facet arthropathy  Mild canal stenosis  Lateral recess narrowing  Severe right and moderate severe left foraminal stenosis      PARASPINAL SOFT TISSUES:   Normal      IMPRESSION:     Severe multilevel degenerative spondylosis without significant change       No evidence of discitis/osteomyelitis  No definite epidural collection but MRI is more sensitive  Consider further evaluation with contrast enhanced MRI if there is continued clinical concern for infection      No orders to display         No orders of the defined types were placed in this encounter

## 2022-09-02 ENCOUNTER — HOME CARE VISIT (OUTPATIENT)
Dept: HOME HEALTH SERVICES | Facility: HOME HEALTHCARE | Age: 67
End: 2022-09-02
Payer: MEDICARE

## 2022-09-02 VITALS
HEART RATE: 88 BPM | DIASTOLIC BLOOD PRESSURE: 60 MMHG | SYSTOLIC BLOOD PRESSURE: 120 MMHG | OXYGEN SATURATION: 6 % | RESPIRATION RATE: 20 BRPM | TEMPERATURE: 97.6 F

## 2022-09-02 PROCEDURE — 10330064

## 2022-09-02 PROCEDURE — G0299 HHS/HOSPICE OF RN EA 15 MIN: HCPCS

## 2022-09-04 ENCOUNTER — HOME CARE VISIT (OUTPATIENT)
Dept: HOME HEALTH SERVICES | Facility: HOME HEALTHCARE | Age: 67
End: 2022-09-04
Payer: MEDICARE

## 2022-09-04 VITALS
RESPIRATION RATE: 16 BRPM | SYSTOLIC BLOOD PRESSURE: 140 MMHG | OXYGEN SATURATION: 98 % | HEART RATE: 85 BPM | DIASTOLIC BLOOD PRESSURE: 70 MMHG | TEMPERATURE: 97.3 F

## 2022-09-04 PROCEDURE — G0299 HHS/HOSPICE OF RN EA 15 MIN: HCPCS

## 2022-09-04 NOTE — CASE COMMUNICATION
Ship to x Pt  Home or Clinician    Insurance [facility]     Wound 1 left foot           Partialx    All items are ordered by the each unless otherwise noted    PLEASE DO NOT ORDER BY THE BOX  Request specific quantity needed  For Private Insurances order should be for a 2 week period    Dry Dressings   (Nonsterile gauze not covered by private insurance)  (Sterile gauze may be requested for insurance rather than nonsterile gauze)  Gauze Luiza str etch roll 4inch n/s 12 rolls per unit  [id number]  ABD 5x9 [id number]

## 2022-09-06 ENCOUNTER — HOSPITAL ENCOUNTER (OUTPATIENT)
Dept: MRI IMAGING | Facility: HOSPITAL | Age: 67
Discharge: HOME | End: 2022-09-06
Attending: ANESTHESIOLOGY
Payer: MEDICARE

## 2022-09-06 ENCOUNTER — HOME CARE VISIT (OUTPATIENT)
Dept: HOME HEALTH SERVICES | Facility: HOME HEALTHCARE | Age: 67
End: 2022-09-06
Payer: MEDICARE

## 2022-09-06 VITALS
RESPIRATION RATE: 15 BRPM | DIASTOLIC BLOOD PRESSURE: 70 MMHG | SYSTOLIC BLOOD PRESSURE: 132 MMHG | HEART RATE: 78 BPM | OXYGEN SATURATION: 97 %

## 2022-09-06 DIAGNOSIS — G89.4 CHRONIC PAIN SYNDROME: ICD-10-CM

## 2022-09-06 PROCEDURE — G1004 CDSM NDSC: HCPCS

## 2022-09-06 PROCEDURE — 72146 MRI CHEST SPINE W/O DYE: CPT

## 2022-09-06 PROCEDURE — G0157 HHC PT ASSISTANT EA 15: HCPCS

## 2022-09-06 PROCEDURE — 10330064 PAD, ABD 5X9" STR LF (1/PK 20PK/BX) MGM1

## 2022-09-06 PROCEDURE — 10330064 BANDAGE, CNFRM 4"X4.1YDS N/S LF (12RL/BG

## 2022-09-08 ENCOUNTER — HOME CARE VISIT (OUTPATIENT)
Dept: HOME HEALTH SERVICES | Facility: HOME HEALTHCARE | Age: 67
End: 2022-09-08
Payer: MEDICARE

## 2022-09-08 VITALS
DIASTOLIC BLOOD PRESSURE: 60 MMHG | SYSTOLIC BLOOD PRESSURE: 120 MMHG | OXYGEN SATURATION: 96 % | RESPIRATION RATE: 18 BRPM | TEMPERATURE: 97.2 F | HEART RATE: 68 BPM

## 2022-09-08 PROCEDURE — G0299 HHS/HOSPICE OF RN EA 15 MIN: HCPCS

## 2022-09-08 PROCEDURE — 10330064 SALINE, IRR SOL STR 100ML (48/CS) MGM37

## 2022-09-08 PROCEDURE — G0157 HHC PT ASSISTANT EA 15: HCPCS

## 2022-09-09 ENCOUNTER — TELEPHONE (OUTPATIENT)
Dept: PAIN MEDICINE | Facility: CLINIC | Age: 67
End: 2022-09-09

## 2022-09-09 DIAGNOSIS — M48.062 SPINAL STENOSIS OF LUMBAR REGION WITH NEUROGENIC CLAUDICATION: Primary | ICD-10-CM

## 2022-09-09 NOTE — TELEPHONE ENCOUNTER
----- Message from Derek Cano DO sent at 9/9/2022 10:55 AM EDT -----  MRI thoracic spine: There is moderate canal stenosis at the L1-2   level as a result of disc and endplate hypertrophic change as well as left-sided disc herniation      Incidental thyroid nodule within the left lobe of the thyroid gland  A thyroid ultrasound was performed 3/20/2021 which has specific recommendations for both right and left-sided thyroid nodules, which have not yet been backed upon      Needs to follow-up with his PCP regarding his thyroid nodule, please call PCP and let them know to contact patient regarding the lack of studies        Needs surgical evaluation prior to proceeding with any spinal cord stimulation secondary to the moderate stenosis at L1-2

## 2022-09-09 NOTE — TELEPHONE ENCOUNTER
----- Message from Flash Barboza DO sent at 9/9/2022 10:55 AM EDT -----  MRI thoracic spine: There is moderate canal stenosis at the L1-2   level as a result of disc and endplate hypertrophic change as well as left-sided disc herniation      Incidental thyroid nodule within the left lobe of the thyroid gland  A thyroid ultrasound was performed 3/20/2021 which has specific recommendations for both right and left-sided thyroid nodules, which have not yet been backed upon      Needs to follow-up with his PCP regarding his thyroid nodule, please call PCP and let them know to contact patient regarding the lack of studies        Needs surgical evaluation prior to proceeding with any spinal cord stimulation secondary to the moderate stenosis at L1-2

## 2022-09-09 NOTE — TELEPHONE ENCOUNTER
S/w pt, advised of MRI findings  Advised pt to fu w/ PCP re: incidental thyroid findings and fu w/ SL NS  Contact info provided  S/w Dr Saima Coburn office  Advised of above  Faxed 9/6/22 mri report to [phone] for review  Neurosurgery order please

## 2022-09-10 ENCOUNTER — HOME CARE VISIT (OUTPATIENT)
Dept: HOME HEALTH SERVICES | Facility: HOME HEALTHCARE | Age: 67
End: 2022-09-10
Payer: MEDICARE

## 2022-09-10 VITALS
SYSTOLIC BLOOD PRESSURE: 114 MMHG | RESPIRATION RATE: 16 BRPM | DIASTOLIC BLOOD PRESSURE: 62 MMHG | HEART RATE: 64 BPM | OXYGEN SATURATION: 97 %

## 2022-09-10 PROCEDURE — G0299 HHS/HOSPICE OF RN EA 15 MIN: HCPCS

## 2022-09-12 ENCOUNTER — HOME CARE VISIT (OUTPATIENT)
Dept: HOME HEALTH SERVICES | Facility: HOME HEALTHCARE | Age: 67
End: 2022-09-12
Payer: MEDICARE

## 2022-09-12 ENCOUNTER — TELEPHONE (OUTPATIENT)
Dept: BEHAVIORAL/MENTAL HEALTH CLINIC | Facility: CLINIC | Age: 67
End: 2022-09-12

## 2022-09-12 VITALS
OXYGEN SATURATION: 98 % | DIASTOLIC BLOOD PRESSURE: 80 MMHG | RESPIRATION RATE: 15 BRPM | SYSTOLIC BLOOD PRESSURE: 140 MMHG | HEART RATE: 80 BPM

## 2022-09-12 PROCEDURE — G0151 HHCP-SERV OF PT,EA 15 MIN: HCPCS

## 2022-09-12 PROCEDURE — G0299 HHS/HOSPICE OF RN EA 15 MIN: HCPCS

## 2022-09-13 ENCOUNTER — TELEPHONE (OUTPATIENT)
Dept: BEHAVIORAL/MENTAL HEALTH CLINIC | Facility: CLINIC | Age: 67
End: 2022-09-13

## 2022-09-13 ENCOUNTER — DOCUMENTATION (OUTPATIENT)
Dept: BEHAVIORAL/MENTAL HEALTH CLINIC | Facility: CLINIC | Age: 67
End: 2022-09-13

## 2022-09-13 VITALS — OXYGEN SATURATION: 98 % | HEART RATE: 73 BPM | DIASTOLIC BLOOD PRESSURE: 80 MMHG | SYSTOLIC BLOOD PRESSURE: 140 MMHG

## 2022-09-13 NOTE — PROGRESS NOTES
[facility]    Patient Name Fabio Kanpp     Date of Birth: 74 y o  1955      MRN: [id number]    Admission Date: 5/12/2015, though probably earlier but in prior EHR    Date of Transfer: September 13, 2022    Admission Diagnosis:       1  Bipolar Disorder, type II  2  Dysthymic Disorder    Current Diagnosis:     No diagnosis found  Reason for Admission: Ananya Perez presented for treatment due to depression and anxiety  Primary complaints included DEPRESSIVE SYMPTOMS: depressed mood, anhedonia, sadness, low energy, low motivation, ANXIETY SYMPTOMS: feeling nervous, worrying about everyday issues, feeling agitated, racing thoughts and MOOD INSTABILITY SYMPTOMS: irritability, mood swings  Progress in Treatment: Juan was seen for Psychiatric Evaluation, Medication Management, Individual Couseling and Family Couseling  During the course of treatment he addressed health issues, family concerns and dynamics, legal challenges and hx of traumatic events, acceptance of physical and mh issues, acceptance of self with disability, grief and loss of mutliple family members    Episodes of Higher Level of Care: No    Transfer request Initiated by: Psychiatrist: None Therapist: Andrea Chacon LPC, MA    Reason for Transfer Request: Medicare effective 7/1/22, Medicaid secondary, unable to see [facility]    Does this individual need a clinician with specialized training/expertise?: No    Is this client working with any other [facility] Providers/Therapists?  Psychiatrist: None Therapist: None    Other pertinent issues: none noted    Are there any specific individuals who would be a best fit or who have already agreed to accept this transfer request?      Psychiatrist: None   Therapist: Medicare provider; pastoral counselor good, but not required  Rationale: Tracey is very important to client, gives comfort/solace    Attempts to maintain the current therapeutic relationship: Not Applicable    Transfer request routed to Intake for input and/or approval      Comments from other involved providers and/or clinical coordinator: This is an active, existing client, not a new client  He sees his PCP Dr Yajaira Arevalo for medications      Ana Maria Pelaez

## 2022-09-14 ENCOUNTER — OFFICE VISIT (OUTPATIENT)
Dept: WOUND CARE | Facility: HOSPITAL | Age: 67
End: 2022-09-14
Payer: MEDICARE

## 2022-09-14 VITALS
DIASTOLIC BLOOD PRESSURE: 88 MMHG | HEART RATE: 76 BPM | TEMPERATURE: 96.8 F | SYSTOLIC BLOOD PRESSURE: 150 MMHG | RESPIRATION RATE: 16 BRPM

## 2022-09-14 DIAGNOSIS — E11.42 DIABETIC PERIPHERAL NEUROPATHY (HCC): ICD-10-CM

## 2022-09-14 DIAGNOSIS — L97.522 DIABETIC ULCER OF OTHER PART OF LEFT FOOT ASSOCIATED WITH TYPE 2 DIABETES MELLITUS, WITH FAT LAYER EXPOSED (HCC): Primary | ICD-10-CM

## 2022-09-14 DIAGNOSIS — E11.621 DIABETIC ULCER OF OTHER PART OF LEFT FOOT ASSOCIATED WITH TYPE 2 DIABETES MELLITUS, WITH FAT LAYER EXPOSED (HCC): Primary | ICD-10-CM

## 2022-09-14 PROCEDURE — 11042 DBRDMT SUBQ TIS 1ST 20SQCM/<: CPT | Performed by: PODIATRIST

## 2022-09-14 NOTE — PROGRESS NOTES
Patient ID: Davy Oviedo is a 74 y o  male Date of Birth 1955     Chief Complaint  Chief Complaint   Patient presents with    Follow Up Wound Care Visit     Left foot wound       Allergies  Cephalexin, Abilify [aripiprazole], and Molds & smuts    Assessment:    Diabetic ulcer of other part of left foot associated with type 2 diabetes mellitus, with fat layer exposed ([facility] [address] )  -     Wound cleansing and dressings; Future  -     Wound off loading; Future  -     Wound home care; Future    Diabetic peripheral neuropathy ([facility] [address] )    Other orders  -     Debridement        Plan:  · Continue with Puracol/mupirocin every other day  · Continue with wedge shoe for forefoot offloading  · Follow-up 2 weeks  Debridement   Wound 08/31/22 Diabetic Ulcer Foot Left;Plantar;Medial    Universal Protocol:  Consent: Verbal consent obtained  Written consent obtained  Risks and benefits: risks, benefits and alternatives were discussed  Consent given by: patient  Time out: Immediately prior to procedure a "time out" was called to verify the correct patient, procedure, equipment, support staff and site/side marked as required    Patient understanding: patient states understanding of the procedure being performed  Patient identity confirmed: verbally with patient      Performed by: physician  Debridement type: surgical  Level of debridement: subcutaneous tissue  Pain control: lidocaine 4%  Post-debridement measurements  Length (cm): 0 4  Width (cm): 1 5  Depth (cm): 0 2  Percent debrided: 100%  Surface Area (cm^2): 0 6  Area debrided (cm^2): 0 6  Volume (cm^3): 0 12  Tissue and other material debrided: subcutaneous tissue  Devitalized tissue debrided: callus, necrotic debris and slough  Instrument(s) utilized: curette and blade  Bleeding: medium  Hemostasis obtained with: pressure  Procedural pain (0-10): 3  Post-procedural pain: 3   Response to treatment: procedure was tolerated well            Wound 08/31/22 Diabetic Ulcer Foot Left;Plantar;Medial (Active)   Wound Image Images linked 09/14/22 0929   Wound Length (cm) 0 4 cm 09/14/22 0844   Wound Width (cm) 1 5 cm 09/14/22 0844   Wound Depth (cm) 0 2 cm 09/14/22 0844   Wound Surface Area (cm^2) 0 6 cm^2 09/14/22 0844   Wound Volume (cm^3) 0 12 cm^3 09/14/22 0844   Calculated Wound Volume (cm^3) 0 12 cm^3 09/14/22 0844   Change in Wound Size % 20 09/14/22 0844   Drainage Amount Small 09/14/22 0844   Drainage Description Yellow 09/14/22 0844   Non-staged Wound Description Full thickness 09/14/22 0844   Dressing Status Intact 09/14/22 0844       Wound 08/31/22 Diabetic Ulcer Foot Left;Plantar;Medial (Active)   Date First Assessed/Time First Assessed: 08/31/22 0839   Pre-Existing Wound: Yes  Primary Wound Type: Diabetic Ulcer  Location: Foot  Wound Location Orientation: Left;Plantar;Medial       [REMOVED] Wound 08/12/22 Foot Left (Removed)   Resolved Date/Resolved Time: 08/31/22 9640  Date First Assessed/Time First Assessed: 08/12/22 1827   Location: Foot  Wound Location Orientation: Left  Wound Description (Comments): 12"  of !//4" iodoform gauze placed in [id number]'sKlingAce Bandag    Subjective:           9/14/2022:  74year-old type 2 diabetic seen today for follow-up evaluation DFU left great toe, denies any new problems or significant changes  Reports some progress noted with still smaller size of wound  8/31/2022:  62-year-old type 2 diabetic recently discharged from [address] [place] [address] after course of IV antibiotics and bedside debridement infected diabetic foot ulceration left great toe  Denies any new pain or discomfort and reports good progress with diminished erythema edema and drainage        The following portions of the patient's history were reviewed and updated as appropriate: allergies, current medications, past family history, past medical history, past social history, past surgical history and problem list     Review of Systems   Constitutional: Negative for chills and fever  HENT: Negative for ear pain and sore throat  Eyes: Negative for pain and visual disturbance  Respiratory: Negative for cough and shortness of breath  Cardiovascular: Negative for chest pain and palpitations  Gastrointestinal: Negative for abdominal pain and vomiting  Genitourinary: Negative for dysuria and hematuria  Musculoskeletal: Positive for gait problem  Negative for arthralgias and back pain  Skin: Positive for wound (Diabetic foot ulcer left great toe status post incision drainage)  Negative for color change and rash  Neurological: Positive for numbness  Negative for seizures and syncope  All other systems reviewed and are negative  Objective:       Wound 08/31/22 Diabetic Ulcer Foot Left;Plantar;Medial (Active)   Wound Image Images linked 09/14/22 0929   Wound Length (cm) 0 4 cm 09/14/22 0844   Wound Width (cm) 1 5 cm 09/14/22 0844   Wound Depth (cm) 0 2 cm 09/14/22 0844   Wound Surface Area (cm^2) 0 6 cm^2 09/14/22 0844   Wound Volume (cm^3) 0 12 cm^3 09/14/22 0844   Calculated Wound Volume (cm^3) 0 12 cm^3 09/14/22 0844   Change in Wound Size % 20 09/14/22 0844   Drainage Amount Small 09/14/22 0844   Drainage Description Yellow 09/14/22 0844   Non-staged Wound Description Full thickness 09/14/22 0844   Dressing Status Intact 09/14/22 0844                 /88   Pulse 76   Temp (!) 96 8 °F (36 °C)   Resp 16     Physical Exam  Constitutional:       Appearance: Normal appearance  HENT:      Head: Normocephalic  Right Ear: Tympanic membrane normal       Left Ear: Tympanic membrane normal       Nose: No congestion  Mouth/Throat:      Mouth: Mucous membranes are moist       Pharynx: No oropharyngeal exudate or posterior oropharyngeal erythema  Eyes:      Extraocular Movements: Extraocular movements intact  Conjunctiva/sclera: Conjunctivae normal       Pupils: Pupils are equal, round, and reactive to light     Cardiovascular: Rate and Rhythm: Normal rate and regular rhythm  Pulses: Normal pulses  Pulmonary:      Effort: Pulmonary effort is normal    Abdominal:      Palpations: Abdomen is soft  Feet:      Right foot:      Protective Sensation: 10 sites tested  1 site sensed  Left foot:      Protective Sensation: 10 sites tested  1 site sensed  Skin integrity: Ulcer (Left foot: SQ depth ulcerative breakdown plantar medial aspect, evidence of resolving infection with superficial skin flaking  New skin formation noted with increasing granulation  No evidence of infection ) present  Skin:     General: Skin is warm and dry  Capillary Refill: Capillary refill takes 2 to 3 seconds  Coloration: Skin is not pale  Findings: No bruising, erythema, lesion or rash  Neurological:      General: No focal deficit present  Mental Status: He is alert  Cranial Nerves: No cranial nerve deficit  Sensory: No sensory deficit  Motor: No weakness  Gait: Gait normal       Deep Tendon Reflexes: Reflexes normal    Psychiatric:         Mood and Affect: Mood normal          Behavior: Behavior normal          Judgment: Judgment normal            Wound Instructions:  Orders Placed This Encounter   Procedures    Wound cleansing and dressings     Left foot wound     Wash your hands with soap and water  Remove old dressing, discard into plastic bag and place in trash  Cleanse the wound with saline prior to applying a clean dressing  Do not use tissue or cotton balls  Do not scrub the wound  Pat dry using gauze  Shower yes      Apply Mupirocin and puracol(if puracol is not dissolvingcan put Mupirocin over purocol)  to the open wound  Cover with gauze and luz    Secure with tubifast blue  Change dressing every other day  Done today  Return in two weeks     Standing Status:   Future     Standing Expiration Date:   9/14/2023    Wound off loading     Off-loading Instructions:  Continue to wear darco shoe Standing Status:   Future     Standing Expiration Date:   9/14/2023    Wound home care     Continue with home care     Standing Status:   Future     Standing Expiration Date:   9/14/2023    Debridement     This order was created via procedure documentation        Diagnosis ICD-10-CM Associated Orders   1  Diabetic ulcer of other part of left foot associated with type 2 diabetes mellitus, with fat layer exposed ([facility] [address] )  [address] 621 Wound cleansing and dressings    [address] 522 Wound off loading     Wound home care   2   Diabetic peripheral neuropathy ([facility])  [address] 42

## 2022-09-14 NOTE — PATIENT INSTRUCTIONS
Orders Placed This Encounter   Procedures    Wound cleansing and dressings     Left foot wound     Wash your hands with soap and water  Remove old dressing, discard into plastic bag and place in trash  Cleanse the wound with saline prior to applying a clean dressing  Do not use tissue or cotton balls  Do not scrub the wound  Pat dry using gauze  Shower yes      Apply Mupirocin and puracol(if puracol is not dissolvingcan put Mupirocin over purocol)  to the open wound  Cover with gauze and luz    Secure with tubifast blue  Change dressing every other day  Done today  Return in two weeks     Standing Status:   Future     Standing Expiration Date:   9/14/2023    Wound off loading     Off-loading Instructions:  Continue to wear darco shoe     Standing Status:   Future     Standing Expiration Date:   9/14/2023    Wound home care     Continue with home care     Standing Status:   Future     Standing Expiration Date:   9/14/2023

## 2022-09-15 ENCOUNTER — TELEPHONE (OUTPATIENT)
Dept: PAIN MEDICINE | Facility: CLINIC | Age: 67
End: 2022-09-15

## 2022-09-15 ENCOUNTER — HOME CARE VISIT (OUTPATIENT)
Dept: HOME HEALTH SERVICES | Facility: HOME HEALTHCARE | Age: 67
End: 2022-09-15
Payer: MEDICARE

## 2022-09-15 DIAGNOSIS — M79.18 MYOFASCIAL PAIN SYNDROME: ICD-10-CM

## 2022-09-15 DIAGNOSIS — M51.27 HERNIATED NUCLEUS PULPOSUS OF LUMBOSACRAL REGION: ICD-10-CM

## 2022-09-15 DIAGNOSIS — M47.816 LUMBAR SPONDYLOSIS: Primary | ICD-10-CM

## 2022-09-15 DIAGNOSIS — M54.16 LUMBAR RADICULOPATHY: ICD-10-CM

## 2022-09-15 RX ORDER — PREGABALIN 50 MG/1
CAPSULE ORAL
Qty: 90 CAPSULE | Refills: 0 | Status: SHIPPED | OUTPATIENT
Start: 2022-09-15 | End: 2022-09-22

## 2022-09-15 NOTE — TELEPHONE ENCOUNTER
S/w pt and his wife, reviewed 9/1 ov note  Pt confirmed lyrica 50 mg 1 pill per day with no se's or issues  Advised pt, the writer will d/w NM and cb to advise of the next steps  Pt verbalized understanding and appreciation

## 2022-09-15 NOTE — TELEPHONE ENCOUNTER
He should increase the dose to 50 mg 2 times a day for 1 week  If not relief can increase to 50 mg 3 times a day  The dose he is on is the starting dose  Please call with an update in 2 weeks, sooner if side effects   I will send a new script to the pharmacy

## 2022-09-15 NOTE — TELEPHONE ENCOUNTER
LMOM to cb  Provided cb number and office hours  NOTE    To help with the pain I will start him on Lyrica 50 mg 1 tablet daily  I instructed him to take the medication every day in order for it to be effective  He was also aware that it can take 2 weeks to reach the full effects of the medication  Side effects were reviewed which include drowsiness, dizziness, swelling in the hands and feet, and blurred vision  If side effects occur, he is instructed to contact the office  I also asked that he contact the office in 2 weeks with an update on his pain  If not obtaining adequate pain relief, and having no side effects, I will increase the dose further

## 2022-09-15 NOTE — TELEPHONE ENCOUNTER
Patient's wife Nneka Hicks states Lyrica medication isn't helping pt with his 10/10 pain level  He's also taking other pain meds in addition & nothing is helping  She would like to discuss alternative options for his pain relief   Please reach out to her at # [phone], thx

## 2022-09-16 ENCOUNTER — HOSPITAL ENCOUNTER (OUTPATIENT)
Dept: ULTRASOUND IMAGING | Facility: HOSPITAL | Age: 67
End: 2022-09-16
Payer: MEDICARE

## 2022-09-16 ENCOUNTER — HOME CARE VISIT (OUTPATIENT)
Dept: HOME HEALTH SERVICES | Facility: HOME HEALTHCARE | Age: 67
End: 2022-09-16
Payer: MEDICARE

## 2022-09-16 VITALS — SYSTOLIC BLOOD PRESSURE: 178 MMHG | DIASTOLIC BLOOD PRESSURE: 88 MMHG | TEMPERATURE: 97.9 F | HEART RATE: 80 BPM

## 2022-09-16 DIAGNOSIS — C73 THYROID CANCER (HCC): ICD-10-CM

## 2022-09-16 DIAGNOSIS — Z85.850 HX OF THYROID CANCER: ICD-10-CM

## 2022-09-16 LAB
APTT PPP: 28 SEC (ref 24–33)
ERYTHROCYTE [DISTWIDTH] IN BLOOD BY AUTOMATED COUNT: 13.5 % (ref 11.6–15.4)
HCT VFR BLD AUTO: 41.7 % (ref 37.5–51)
HGB BLD-MCNC: 13.8 G/DL (ref 13–17.7)
INR PPP: 1 (ref 0.9–1.2)
MCH RBC QN AUTO: 27.5 PG (ref 26.6–33)
MCHC RBC AUTO-ENTMCNC: 33.1 G/DL (ref 31.5–35.7)
MCV RBC AUTO: 83 FL (ref 79–97)
PLATELET # BLD AUTO: 357 X10E3/UL (ref 150–450)
PROTHROMBIN TIME: 10.1 SEC (ref 9.1–12)
RBC # BLD AUTO: 5.01 X10E6/UL (ref 4.14–5.8)
WBC # BLD AUTO: 10.1 X10E3/UL (ref 3.4–10.8)

## 2022-09-16 PROCEDURE — G0299 HHS/HOSPICE OF RN EA 15 MIN: HCPCS

## 2022-09-16 PROCEDURE — 400013 VN SOC

## 2022-09-16 PROCEDURE — 76536 US EXAM OF HEAD AND NECK: CPT

## 2022-09-16 NOTE — TELEPHONE ENCOUNTER
S/w [facility], advised of above  Pt verbalized understanding and appreciation  Will proceed as directed and cb if questions / issues arise

## 2022-09-18 ENCOUNTER — HOME CARE VISIT (OUTPATIENT)
Dept: HOME HEALTH SERVICES | Facility: HOME HEALTHCARE | Age: 67
End: 2022-09-18
Payer: MEDICARE

## 2022-09-18 VITALS
OXYGEN SATURATION: 96 % | RESPIRATION RATE: 18 BRPM | SYSTOLIC BLOOD PRESSURE: 140 MMHG | HEART RATE: 99 BPM | TEMPERATURE: 97 F | DIASTOLIC BLOOD PRESSURE: 72 MMHG

## 2022-09-18 PROCEDURE — G0299 HHS/HOSPICE OF RN EA 15 MIN: HCPCS

## 2022-09-18 NOTE — CASE COMMUNICATION
Ship to x Pt  Home or Clinician   Insurance [facility]   Wound 1 left foot Partialx    All items are ordered by the each unless otherwise noted  PLEASE DO NOT ORDER BY THE BOX  Request specific quantity needed    Tape  Tape   Mefix 2inc [id number]

## 2022-09-19 ENCOUNTER — TELEPHONE (OUTPATIENT)
Dept: BEHAVIORAL/MENTAL HEALTH CLINIC | Facility: CLINIC | Age: 67
End: 2022-09-19

## 2022-09-19 ENCOUNTER — HOME CARE VISIT (OUTPATIENT)
Dept: HOME HEALTH SERVICES | Facility: HOME HEALTHCARE | Age: 67
End: 2022-09-19
Payer: MEDICARE

## 2022-09-19 VITALS
OXYGEN SATURATION: 95 % | TEMPERATURE: 97.2 F | SYSTOLIC BLOOD PRESSURE: 139 MMHG | RESPIRATION RATE: 20 BRPM | DIASTOLIC BLOOD PRESSURE: 84 MMHG | HEART RATE: 105 BPM

## 2022-09-19 PROCEDURE — G0157 HHC PT ASSISTANT EA 15: HCPCS

## 2022-09-19 PROCEDURE — 10330064 TAPE, RETENTION 2"X10YDS (1/BX24BX/CS)

## 2022-09-20 ENCOUNTER — HOME CARE VISIT (OUTPATIENT)
Dept: HOME HEALTH SERVICES | Facility: HOME HEALTHCARE | Age: 67
End: 2022-09-20
Payer: MEDICARE

## 2022-09-20 VITALS
OXYGEN SATURATION: 98 % | HEART RATE: 88 BPM | DIASTOLIC BLOOD PRESSURE: 75 MMHG | RESPIRATION RATE: 16 BRPM | SYSTOLIC BLOOD PRESSURE: 140 MMHG

## 2022-09-20 PROCEDURE — G0299 HHS/HOSPICE OF RN EA 15 MIN: HCPCS

## 2022-09-20 NOTE — TELEPHONE ENCOUNTER
Left Message - Spoke with Monalisa Null returning her call about release from jury duty letter, request for [employer] letter and need to change therapist due to Medicare/medicaid eff 7/1/22   Advised will write jury letter  No to [employer] letter   C'd 9/27, will call ct

## 2022-09-21 ENCOUNTER — HOME CARE VISIT (OUTPATIENT)
Dept: HOME HEALTH SERVICES | Facility: HOME HEALTHCARE | Age: 67
End: 2022-09-21
Payer: MEDICARE

## 2022-09-21 PROCEDURE — G0157 HHC PT ASSISTANT EA 15: HCPCS

## 2022-09-21 NOTE — TELEPHONE ENCOUNTER
Please D/C the Lyrica    If symptoms persist there is concern for cardiac etiology, recommend evaluation with PCP, cardiologist etc     Follow up in office with Smiley Patel or Dr Yañez Gear

## 2022-09-21 NOTE — TELEPHONE ENCOUNTER
Pt's Lyrica 50 mg was increased to 2 x a day and he is having heart palpitations, shaky hands and very tired  The medication still isn't helping him       Pt # [phone]

## 2022-09-21 NOTE — TELEPHONE ENCOUNTER
S/w pt, stated that he increased lyrica to 2 pills / day 6 days ago as directed by NM  Pt stated that since then he is jittery at times w/ shaky hands  Pt stated that he also feels his heart racing sometimes  Pt stated that these symptoms are not improving with time  Per pt, he does not feel that he is getting any improvement w/ Lyrica at this point  Advised pt, the writer will d/w the doctor on call and cb to advise  Pt verbalized understanding and appreciation

## 2022-09-21 NOTE — TELEPHONE ENCOUNTER
S/w pt, advised of above  Pt stated that he was monitored for mitral valve prolapse in the past however, resolved at this point per pt  Advised pt, the writer will forward this to NM for follow up  It may take time for the symptoms to resolve - please cb on Friday am if the symptoms are not noticeably better  Pt questioned what about his pain? Advised pt, until this situation is resolved / resolving, please use rest, ice / heat, medications as directed or prescribed  Go to the ER if your pain is beyond your control or if you symptoms get worse  Pt verbalized understanding and appreciation

## 2022-09-22 ENCOUNTER — HOME CARE VISIT (OUTPATIENT)
Dept: HOME HEALTH SERVICES | Facility: HOME HEALTHCARE | Age: 67
End: 2022-09-22
Payer: MEDICARE

## 2022-09-22 VITALS
DIASTOLIC BLOOD PRESSURE: 70 MMHG | HEART RATE: 92 BPM | SYSTOLIC BLOOD PRESSURE: 125 MMHG | RESPIRATION RATE: 16 BRPM | OXYGEN SATURATION: 98 %

## 2022-09-22 VITALS
HEART RATE: 110 BPM | OXYGEN SATURATION: 97 % | TEMPERATURE: 97.1 F | DIASTOLIC BLOOD PRESSURE: 78 MMHG | SYSTOLIC BLOOD PRESSURE: 151 MMHG | RESPIRATION RATE: 19 BRPM

## 2022-09-22 PROCEDURE — G0299 HHS/HOSPICE OF RN EA 15 MIN: HCPCS

## 2022-09-22 NOTE — TELEPHONE ENCOUNTER
Would you mind following up with patient to see if he stopped Lyrica and if his heart symptoms resolved

## 2022-09-22 NOTE — TELEPHONE ENCOUNTER
S/w pt, stated that the symptoms started to improve yesterday around 2 - 3 pm  Pt stated that he feels much better today  Advised pt, the writer will d/w NM and cb if there is anything additional  Pt verbalized understanding and appreciation

## 2022-09-23 RX ORDER — CELECOXIB 200 MG/1
CAPSULE ORAL
Qty: 60 CAPSULE | Refills: 1 | Status: SHIPPED | OUTPATIENT
Start: 2022-09-23

## 2022-09-23 NOTE — TELEPHONE ENCOUNTER
We could try Celebrex 200 mg BID with food and he should not take any other NSAIDs except for acetaminophen or tylenol  I sent a script  He should try the Celebrex and give it a good week before we can say it is not working  If he has any side effects or issues he should call our office  I sent a script with a refill to his pharmacy on file

## 2022-09-23 NOTE — TELEPHONE ENCOUNTER
S/w pt's wife, Angelo Salazar  Advised of above  Angelo Lincoln verbalized understanding and appreciation  Will proceed as directed

## 2022-09-23 NOTE — TELEPHONE ENCOUNTER
S/w pt and his wife Carlos Eduardo Powers  Per Carlos Eduardo Powers, the pt stopped lyrica yesterday s/t se's  Stated that he continues with significant pain in his low back / pelvis  Pt is scheduled for an appt re [facility] however, he needs some relief in the meantime  Per Carlos Eduardo Powers, pt is taking Tylenol 2 tabs, ibuprofen 4 tabs and flexeril tid  Also using voltaren gel / max freeze with no relief  Per Carlos Eduardo Powers, pt has tried gabapentin with no relief  Pt was taking cymbalta for depression, 60 mg bid with no se's however he felt he needed more help with his depression - cymbalta was topped and zoloft was prescribed  Tom Constantino, the writer will d/w DG and cb to advise  Carlos Eduardo Gio verbalized understanding and appreciation

## 2022-09-23 NOTE — TELEPHONE ENCOUNTER
Pt wife called to see if anything would be given to pt for pain     Pt can be reached at [phone]

## 2022-09-24 ENCOUNTER — HOME CARE VISIT (OUTPATIENT)
Dept: HOME HEALTH SERVICES | Facility: HOME HEALTHCARE | Age: 67
End: 2022-09-24
Payer: MEDICARE

## 2022-09-24 PROCEDURE — G0299 HHS/HOSPICE OF RN EA 15 MIN: HCPCS

## 2022-09-25 VITALS
TEMPERATURE: 97.1 F | RESPIRATION RATE: 18 BRPM | DIASTOLIC BLOOD PRESSURE: 82 MMHG | HEART RATE: 74 BPM | OXYGEN SATURATION: 98 % | SYSTOLIC BLOOD PRESSURE: 120 MMHG

## 2022-09-26 ENCOUNTER — TELEMEDICINE (OUTPATIENT)
Dept: BEHAVIORAL/MENTAL HEALTH CLINIC | Facility: CLINIC | Age: 67
End: 2022-09-26
Payer: MEDICARE

## 2022-09-26 ENCOUNTER — HOME CARE VISIT (OUTPATIENT)
Dept: HOME HEALTH SERVICES | Facility: HOME HEALTHCARE | Age: 67
End: 2022-09-26
Payer: MEDICARE

## 2022-09-26 VITALS
HEART RATE: 90 BPM | DIASTOLIC BLOOD PRESSURE: 80 MMHG | RESPIRATION RATE: 15 BRPM | OXYGEN SATURATION: 97 % | SYSTOLIC BLOOD PRESSURE: 140 MMHG

## 2022-09-26 DIAGNOSIS — F34.1 DYSTHYMIC DISORDER: ICD-10-CM

## 2022-09-26 DIAGNOSIS — F31.81 BIPOLAR II DISORDER, MOST RECENT EPISODE MAJOR DEPRESSIVE (HCC): Primary | ICD-10-CM

## 2022-09-26 PROCEDURE — 90834 PSYTX W PT 45 MINUTES: CPT | Performed by: PSYCHOLOGIST

## 2022-09-26 PROCEDURE — G0299 HHS/HOSPICE OF RN EA 15 MIN: HCPCS

## 2022-09-27 ENCOUNTER — HOME CARE VISIT (OUTPATIENT)
Dept: HOME HEALTH SERVICES | Facility: HOME HEALTHCARE | Age: 67
End: 2022-09-27

## 2022-09-27 VITALS — HEART RATE: 101 BPM | SYSTOLIC BLOOD PRESSURE: 122 MMHG | OXYGEN SATURATION: 97 % | DIASTOLIC BLOOD PRESSURE: 66 MMHG

## 2022-09-27 PROCEDURE — G0151 HHCP-SERV OF PT,EA 15 MIN: HCPCS

## 2022-09-28 ENCOUNTER — OFFICE VISIT (OUTPATIENT)
Dept: WOUND CARE | Facility: HOSPITAL | Age: 67
End: 2022-09-28
Payer: MEDICARE

## 2022-09-28 ENCOUNTER — HOME CARE VISIT (OUTPATIENT)
Dept: HOME HEALTH SERVICES | Facility: HOME HEALTHCARE | Age: 67
End: 2022-09-28
Payer: MEDICARE

## 2022-09-28 VITALS
DIASTOLIC BLOOD PRESSURE: 82 MMHG | TEMPERATURE: 97.8 F | RESPIRATION RATE: 16 BRPM | SYSTOLIC BLOOD PRESSURE: 130 MMHG | HEART RATE: 90 BPM

## 2022-09-28 DIAGNOSIS — E11.621 DIABETIC ULCER OF OTHER PART OF LEFT FOOT ASSOCIATED WITH TYPE 2 DIABETES MELLITUS, WITH FAT LAYER EXPOSED (HCC): Primary | ICD-10-CM

## 2022-09-28 DIAGNOSIS — L97.522 DIABETIC ULCER OF OTHER PART OF LEFT FOOT ASSOCIATED WITH TYPE 2 DIABETES MELLITUS, WITH FAT LAYER EXPOSED (HCC): Primary | ICD-10-CM

## 2022-09-28 DIAGNOSIS — E11.42 DIABETIC PERIPHERAL NEUROPATHY (HCC): ICD-10-CM

## 2022-09-28 PROCEDURE — 11042 DBRDMT SUBQ TIS 1ST 20SQCM/<: CPT | Performed by: PODIATRIST

## 2022-09-28 PROCEDURE — 10330064 DRESSING, HYDROGEL 4X4 (15/BX 4BX/CS)

## 2022-09-28 NOTE — PATIENT INSTRUCTIONS
Orders Placed This Encounter   Procedures    Wound cleansing and dressings     Left foot    Wash your hands with soap and water  Remove old dressing, discard into plastic bag and place in trash  Cleanse the wound with NSS prior to applying a clean dressing  Do not use tissue or cotton balls  Do not scrub the wound  Pat dry using gauze  Shower YES     Apply moisturizer to skin surrounding wound  Discontinue Puracol and Bactroban  Apply Dermagran gauze to the left foot wound  Cover with gauze  Secure with luz  Change dressing 3x/week    Returning in 3 weeks    Dressings above done at the [facility]  Standing Status:   Future     Standing Expiration Date:   9/28/2023    Wound off loading     Discontinue Darco wedge shoe  Start using surgical postop shoe       Standing Status:   Future     Standing Expiration Date:   9/28/2023    Wound home care     Continue with home care     Standing Status:   Future     Standing Expiration Date:   9/28/2023

## 2022-09-28 NOTE — CASE COMMUNICATION
Ship to XX Pt  Home  Insurance [facility]     Wound 1 Left foot      Full XX         All items are ordered by the each unless otherwise noted    PLEASE DO NOT ORDER BY THE BOX  Request specific quantity needed  For Private Insurances order should be for a 2 week period      [id number] [facility],# [phone]      2

## 2022-09-28 NOTE — PROGRESS NOTES
Patient ID: Frank Thomson is a 74 y o  male Date of Birth 1955     Chief Complaint  Chief Complaint   Patient presents with    Follow Up Wound Care Visit     Left foot       Allergies  Cephalexin, Abilify [aripiprazole], and Molds & smuts    Assessment:    Diabetic ulcer of other part of left foot associated with type 2 diabetes mellitus, with fat layer exposed ([facility] [address] )  -     Wound cleansing and dressings; Future  -     Wound off loading; Future  -     Wound home care; Future    Diabetic peripheral neuropathy ([facility] [address] )    Other orders  -     Debridement            Debridement   Universal Protocol:  Consent: Verbal consent obtained  Written consent obtained  Risks and benefits: risks, benefits and alternatives were discussed  Consent given by: patient  Time out: Immediately prior to procedure a "time out" was called to verify the correct patient, procedure, equipment, support staff and site/side marked as required  Patient understanding: patient states understanding of the procedure being performed  Patient identity confirmed: verbally with patient      Performed by: physician  Debridement type: surgical  Level of debridement: subcutaneous tissue  Pain control: lidocaine 4%  Post-debridement measurements  Length (cm): 1 3  Width (cm): 0 3  Depth (cm): 0 2  Percent debrided: 100%  Surface Area (cm^2): 0 39  Area debrided (cm^2): 0 39  Volume (cm^3): 0 08  Tissue and other material debrided: subcutaneous tissue  Devitalized tissue debrided: callus, necrotic debris and slough  Instrument(s) utilized: nippers, curette and blade  Bleeding: medium  Hemostasis obtained with: pressure  Procedural pain (0-10): 3  Post-procedural pain: 3   Response to treatment: procedure was tolerated well        Plan:  · Discontinue Puracol mupirocin dressing, initiate Dermagran gauze QOD  · Follow-up 3 weeks  · Call if any signs of infection are noted        Wound 08/31/22 Diabetic Ulcer Foot Left;Plantar;Medial (Active)   Wound Image -- (unable to picture ROVER DOWN) 09/28/22 1145   Wound Description Pink;Yellow 09/28/22 1145   Emely-wound Assessment Intact 09/28/22 1145   Wound Length (cm) 1 3 cm 09/28/22 1145   Wound Width (cm) 0 3 cm 09/28/22 1145   Wound Depth (cm) 0 2 cm 09/28/22 1145   Wound Surface Area (cm^2) 0 39 cm^2 09/28/22 1145   Wound Volume (cm^3) 0 078 cm^3 09/28/22 1145   Calculated Wound Volume (cm^3) 0 08 cm^3 09/28/22 1145   Change in Wound Size % 46 67 09/28/22 1145   Drainage Amount Small 09/28/22 1145   Drainage Description Serous 09/28/22 1145   Non-staged Wound Description Full thickness 09/28/22 1145   Dressing Status Intact 09/28/22 1145       Wound 08/31/22 Diabetic Ulcer Foot Left;Plantar;Medial (Active)   Date First Assessed/Time First Assessed: 08/31/22 0839   Pre-Existing Wound: Yes  Primary Wound Type: Diabetic Ulcer  Location: Foot  Wound Location Orientation: Left;Plantar;Medial       [REMOVED] Wound 08/12/22 Foot Left (Removed)   Resolved Date/Resolved Time: 08/31/22 0064  Date First Assessed/Time First Assessed: 08/12/22 1827   Location: Foot  Wound Location Orientation: Left  Wound Description (Comments): 12"  of !//4" iodoform gauze placed in [id number]'sKlingAce Bandag    Subjective:           9/28/2022:  51-year-old type 2 diabetic presents for follow-up evaluation of chronic DFU left foot  Reports slow but steady progress with diminishing size  Denies any new pain or shortness of breath  9/14/2022:  74year-old type 2 diabetic seen today for follow-up evaluation DFU left great toe, denies any new problems or significant changes  Reports some progress noted with still smaller size of wound  8/31/2022:  51-year-old type 2 diabetic recently discharged from [facility] after course of IV antibiotics and bedside debridement infected diabetic foot ulceration left great toe    Denies any new pain or discomfort and reports good progress with diminished erythema edema and drainage  The following portions of the patient's history were reviewed and updated as appropriate: allergies, current medications, past family history, past medical history, past social history, past surgical history and problem list     Review of Systems   Constitutional: Negative for chills and fever  HENT: Negative for ear pain and sore throat  Eyes: Negative for pain and visual disturbance  Respiratory: Negative for cough and shortness of breath  Cardiovascular: Negative for chest pain and palpitations  Gastrointestinal: Negative for abdominal pain and vomiting  Genitourinary: Negative for dysuria and hematuria  Musculoskeletal: Positive for gait problem  Negative for arthralgias and back pain  Skin: Positive for wound (Diabetic foot ulcer left great toe status post incision drainage)  Negative for color change and rash  Neurological: Positive for numbness  Negative for seizures and syncope  All other systems reviewed and are negative  Objective:       Wound 08/31/22 Diabetic Ulcer Foot Left;Plantar;Medial (Active)   Wound Image -- (unable to picture ROVER DOWN) 09/28/22 1145   Wound Description Pink;Yellow 09/28/22 1145   Emely-wound Assessment Intact 09/28/22 1145   Wound Length (cm) 1 3 cm 09/28/22 1145   Wound Width (cm) 0 3 cm 09/28/22 1145   Wound Depth (cm) 0 2 cm 09/28/22 1145   Wound Surface Area (cm^2) 0 39 cm^2 09/28/22 1145   Wound Volume (cm^3) 0 078 cm^3 09/28/22 1145   Calculated Wound Volume (cm^3) 0 08 cm^3 09/28/22 1145   Change in Wound Size % 46 67 09/28/22 1145   Drainage Amount Small 09/28/22 1145   Drainage Description Serous 09/28/22 1145   Non-staged Wound Description Full thickness 09/28/22 1145   Dressing Status Intact 09/28/22 1145       /82   Pulse 90   Temp 97 8 °F (36 6 °C)   Resp 16     Physical Exam  Constitutional:       Appearance: Normal appearance  HENT:      Head: Normocephalic        Right Ear: Tympanic membrane normal       Left Ear: Tympanic membrane normal       Nose: No congestion  Mouth/Throat:      Mouth: Mucous membranes are moist       Pharynx: No oropharyngeal exudate or posterior oropharyngeal erythema  Eyes:      Extraocular Movements: Extraocular movements intact  Conjunctiva/sclera: Conjunctivae normal       Pupils: Pupils are equal, round, and reactive to light  Cardiovascular:      Rate and Rhythm: Normal rate and regular rhythm  Pulses:           Dorsalis pedis pulses are 1+ on the right side and 1+ on the left side  Posterior tibial pulses are 0 on the right side and 0 on the left side  Pulmonary:      Effort: Pulmonary effort is normal    Abdominal:      Palpations: Abdomen is soft  Feet:      Right foot:      Protective Sensation: 10 sites tested  1 site sensed  Toenail Condition: Right toenails are abnormally thick  Left foot:      Protective Sensation: 10 sites tested  1 site sensed  Skin integrity: Ulcer (Left foot: SQ depth ulcerative breakdown plantar medial 1st MPJ  New skin formation noted with diminishing size and increasing granulation  No evidence of infection ) present  Toenail Condition: Left toenails are abnormally thick  Skin:     General: Skin is warm and dry  Capillary Refill: Capillary refill takes 2 to 3 seconds  Coloration: Skin is not pale  Findings: No bruising, erythema, lesion or rash  Neurological:      General: No focal deficit present  Mental Status: He is alert  Cranial Nerves: No cranial nerve deficit  Sensory: No sensory deficit  Motor: No weakness  Gait: Gait normal       Deep Tendon Reflexes: Reflexes normal    Psychiatric:         Mood and Affect: Mood normal          Behavior: Behavior normal          Judgment: Judgment normal            Wound Instructions:  Orders Placed This Encounter   Procedures    Wound cleansing and dressings     Left foot    Wash your hands with soap and water    Remove old dressing, discard into plastic bag and place in trash  Cleanse the wound with NSS prior to applying a clean dressing  Do not use tissue or cotton balls  Do not scrub the wound  Pat dry using gauze  Shower YES     Apply moisturizer to skin surrounding wound  Discontinue Puracol and Bactroban  Apply Dermagran gauze to the left foot wound  Cover with gauze  Secure with luz  Change dressing 3x/week    Returning in 3 weeks    Dressings above done at the [facility] today  Standing Status:   Future     Standing Expiration Date:   9/28/2023    Wound off loading     Discontinue Darco wedge shoe  Start using surgical postop shoe  Standing Status:   Future     Standing Expiration Date:   9/28/2023    Wound home care     Continue with home care     Standing Status:   Future     Standing Expiration Date:   9/28/2023    Debridement     This order was created via procedure documentation        Diagnosis ICD-10-CM Associated Orders   1  Diabetic ulcer of other part of left foot associated with type 2 diabetes mellitus, with fat layer exposed ([facility] [address] )  E11 621 Wound cleansing and dressings    L97 522 Wound off loading     Wound home care   2   Diabetic peripheral neuropathy (HCC)  E11 42

## 2022-09-29 ENCOUNTER — HOME CARE VISIT (OUTPATIENT)
Dept: HOME HEALTH SERVICES | Facility: HOME HEALTHCARE | Age: 67
End: 2022-09-29
Payer: MEDICARE

## 2022-09-29 PROCEDURE — G0157 HHC PT ASSISTANT EA 15: HCPCS

## 2022-09-30 ENCOUNTER — HOME CARE VISIT (OUTPATIENT)
Dept: HOME HEALTH SERVICES | Facility: HOME HEALTHCARE | Age: 67
End: 2022-09-30
Payer: MEDICARE

## 2022-09-30 VITALS
DIASTOLIC BLOOD PRESSURE: 76 MMHG | RESPIRATION RATE: 16 BRPM | SYSTOLIC BLOOD PRESSURE: 132 MMHG | TEMPERATURE: 96.9 F | OXYGEN SATURATION: 100 % | HEART RATE: 96 BPM

## 2022-09-30 PROCEDURE — G0299 HHS/HOSPICE OF RN EA 15 MIN: HCPCS

## 2022-10-03 ENCOUNTER — HOME CARE VISIT (OUTPATIENT)
Dept: HOME HEALTH SERVICES | Facility: HOME HEALTHCARE | Age: 67
End: 2022-10-03
Payer: MEDICARE

## 2022-10-03 VITALS
OXYGEN SATURATION: 97 % | SYSTOLIC BLOOD PRESSURE: 120 MMHG | RESPIRATION RATE: 15 BRPM | HEART RATE: 82 BPM | DIASTOLIC BLOOD PRESSURE: 60 MMHG

## 2022-10-03 PROCEDURE — G0299 HHS/HOSPICE OF RN EA 15 MIN: HCPCS

## 2022-10-04 ENCOUNTER — HOME CARE VISIT (OUTPATIENT)
Dept: HOME HEALTH SERVICES | Facility: HOME HEALTHCARE | Age: 67
End: 2022-10-04
Payer: MEDICARE

## 2022-10-04 PROCEDURE — G0157 HHC PT ASSISTANT EA 15: HCPCS

## 2022-10-04 RX ORDER — TIZANIDINE 2 MG/1
TABLET ORAL
Qty: 60 TABLET | Refills: 1 | Status: SHIPPED | OUTPATIENT
Start: 2022-10-04

## 2022-10-04 NOTE — TELEPHONE ENCOUNTER
I sent a script for Tizanidine 2 mg, 1 PO QID PRN for pain and spasms  He should not drive or operate machinery until he sees how the medication affects him and should call our office if he has any side effects or issues  He should continue the Celebrex with food BID as well

## 2022-10-04 NOTE — TELEPHONE ENCOUNTER
S/w pt's wife, Jabier Lai  Per Jabier Lai, pt had been getting relief w/ celebrex bid as prescribed however, his pain has come back and his pain is severe  Per Jabier Lai, no se's with celebrex  Questioned how to proceed  Mazin Nino, the writer will d/w DG and cb to advise  Jabier Lai verbalized understanding and appreciation

## 2022-10-04 NOTE — TELEPHONE ENCOUNTER
S/w pt's wife levi, advised of above Per Ciera Walton, the pt's pcp stopped the cymbalta and started the pt on zoloft for his depression  Per Ciera Walton, pt continues with depression, pcp is aware of the pt's pain to a degree  Pt stated that he would like to try the tizanidine in place of cyclobenzaprine  Advised pt, anticipate thiis rx will be sent to the pharmacy for pu tomorrow  The writer will cb to review instructions  Pt verbalized understadnign and appreciation

## 2022-10-04 NOTE — TELEPHONE ENCOUNTER
Patient called with a medication update for Celebrex -      Caller stated that the patients pain is 10/10 and patient isnt able to sleep at night and wants to know if the medication can be increased or should he go to the ED-  thank you        anthony [phone]

## 2022-10-04 NOTE — TELEPHONE ENCOUNTER
He is on the max dose of Celebrex and has previously tried and failed Gabapentin and Lyrica and is already on Cymbalta  We could try a different muscle relaxer such as Tizandine 2 mg QID in place of Cyclobenzaprine and see if that is helpful? Otherwise, if his pain is out of control, he may need to go to the ER for further evaluation

## 2022-10-05 ENCOUNTER — HOME CARE VISIT (OUTPATIENT)
Dept: HOME HEALTH SERVICES | Facility: HOME HEALTHCARE | Age: 67
End: 2022-10-05
Payer: MEDICARE

## 2022-10-05 VITALS
RESPIRATION RATE: 20 BRPM | OXYGEN SATURATION: 97 % | TEMPERATURE: 97.6 F | DIASTOLIC BLOOD PRESSURE: 80 MMHG | SYSTOLIC BLOOD PRESSURE: 130 MMHG | HEART RATE: 72 BPM

## 2022-10-05 VITALS
HEART RATE: 82 BPM | TEMPERATURE: 98 F | DIASTOLIC BLOOD PRESSURE: 82 MMHG | SYSTOLIC BLOOD PRESSURE: 137 MMHG | OXYGEN SATURATION: 99 %

## 2022-10-05 PROCEDURE — G0299 HHS/HOSPICE OF RN EA 15 MIN: HCPCS

## 2022-10-05 NOTE — TELEPHONE ENCOUNTER
S/w pt, informed him to stop his Flexeril and reviewed instructions to start Tizanidine  Reviewed s/e  Advised pt to continue Celebrex

## 2022-10-05 NOTE — CASE COMMUNICATION
Patient is agreeable to dc from therapy at next visit  he has a complete HEP at this time and is getting around the best he can  Educated patient how to get new home care referral if needed  B[address] is not signed as SN is still out to see patient

## 2022-10-06 ENCOUNTER — HOME CARE VISIT (OUTPATIENT)
Dept: HOME HEALTH SERVICES | Facility: HOME HEALTHCARE | Age: 67
End: 2022-10-06
Payer: MEDICARE

## 2022-10-06 PROCEDURE — G0151 HHCP-SERV OF PT,EA 15 MIN: HCPCS

## 2022-10-07 ENCOUNTER — HOME CARE VISIT (OUTPATIENT)
Dept: HOME HEALTH SERVICES | Facility: HOME HEALTHCARE | Age: 67
End: 2022-10-07
Payer: MEDICARE

## 2022-10-07 VITALS
RESPIRATION RATE: 18 BRPM | DIASTOLIC BLOOD PRESSURE: 80 MMHG | HEART RATE: 76 BPM | SYSTOLIC BLOOD PRESSURE: 140 MMHG | OXYGEN SATURATION: 98 % | TEMPERATURE: 96.8 F

## 2022-10-07 PROCEDURE — G0299 HHS/HOSPICE OF RN EA 15 MIN: HCPCS

## 2022-10-10 ENCOUNTER — HOME CARE VISIT (OUTPATIENT)
Dept: HOME HEALTH SERVICES | Facility: HOME HEALTHCARE | Age: 67
End: 2022-10-10
Payer: MEDICARE

## 2022-10-10 VITALS
OXYGEN SATURATION: 98 % | SYSTOLIC BLOOD PRESSURE: 135 MMHG | RESPIRATION RATE: 16 BRPM | DIASTOLIC BLOOD PRESSURE: 65 MMHG | HEART RATE: 90 BPM

## 2022-10-10 PROCEDURE — G0299 HHS/HOSPICE OF RN EA 15 MIN: HCPCS

## 2022-10-12 ENCOUNTER — SOCIAL WORK (OUTPATIENT)
Dept: BEHAVIORAL/MENTAL HEALTH CLINIC | Facility: CLINIC | Age: 67
End: 2022-10-12
Payer: MEDICARE

## 2022-10-12 DIAGNOSIS — F41.9 ANXIETY AND DEPRESSION: Primary | ICD-10-CM

## 2022-10-12 DIAGNOSIS — F32.A ANXIETY AND DEPRESSION: Primary | ICD-10-CM

## 2022-10-12 PROCEDURE — 90791 PSYCH DIAGNOSTIC EVALUATION: CPT

## 2022-10-12 NOTE — PATIENT INSTRUCTIONS
Continue to take all medications as prescribed  Attend all scheduled medical appointments    Follow up with therapist     If you are experiencing a mental health emergency, please call 911 for emergent care, or one of the following numbers for someone to talk to 24 hours a day, 7 days a week:  [facility] Intervention:  [address] Crisis Intervention: [address] [facility] Crisis Intervention: [phone]  [address]:  Text [place] to 212536  PA's support and referral hotline: 464.558.1586  Suicide Prevention Lifeline:  808.617.2726
no Active ROM deficits were identified
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
## 2022-10-14 ENCOUNTER — TELEPHONE (OUTPATIENT)
Dept: PAIN MEDICINE | Facility: CLINIC | Age: 67
End: 2022-10-14

## 2022-10-14 NOTE — TELEPHONE ENCOUNTER
Pt wife called to give updated email for Cherry  She said that Binta Lin will be emailing them a letter      Please advise    Thank You

## 2022-10-18 ENCOUNTER — TELEMEDICINE (OUTPATIENT)
Dept: BEHAVIORAL/MENTAL HEALTH CLINIC | Facility: CLINIC | Age: 67
End: 2022-10-18
Payer: MEDICARE

## 2022-10-18 DIAGNOSIS — F31.81 BIPOLAR II DISORDER, MOST RECENT EPISODE MAJOR DEPRESSIVE (HCC): Primary | ICD-10-CM

## 2022-10-18 PROCEDURE — 90834 PSYTX W PT 45 MINUTES: CPT | Performed by: PSYCHOLOGIST
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
## 2022-10-19 ENCOUNTER — OFFICE VISIT (OUTPATIENT)
Dept: WOUND CARE | Facility: HOSPITAL | Age: 67
End: 2022-10-19
Payer: MEDICARE

## 2022-10-19 VITALS
SYSTOLIC BLOOD PRESSURE: 140 MMHG | RESPIRATION RATE: 16 BRPM | TEMPERATURE: 97.6 F | HEART RATE: 64 BPM | DIASTOLIC BLOOD PRESSURE: 78 MMHG

## 2022-10-19 DIAGNOSIS — E11.42 DIABETIC PERIPHERAL NEUROPATHY (HCC): ICD-10-CM

## 2022-10-19 DIAGNOSIS — E11.621 DIABETIC ULCER OF OTHER PART OF LEFT FOOT ASSOCIATED WITH TYPE 2 DIABETES MELLITUS, WITH FAT LAYER EXPOSED (HCC): Primary | ICD-10-CM

## 2022-10-19 DIAGNOSIS — L97.522 DIABETIC ULCER OF OTHER PART OF LEFT FOOT ASSOCIATED WITH TYPE 2 DIABETES MELLITUS, WITH FAT LAYER EXPOSED (HCC): Primary | ICD-10-CM

## 2022-10-19 PROCEDURE — 99212 OFFICE O/P EST SF 10 MIN: CPT | Performed by: PODIATRIST

## 2022-10-19 NOTE — PROGRESS NOTES
Patient ID: Vince Lincoln is a 74 y o  male Date of Birth 1955     Chief Complaint  Chief Complaint   Patient presents with   • Follow Up Wound Care Visit     Left foot wound       Allergies  Cephalexin, Abilify [aripiprazole], and Molds & smuts    Assessment:    Diabetic ulcer of other part of left foot associated with type 2 diabetes mellitus, with fat layer exposed ([address] )  -     Wound cleansing and dressings; Future    Diabetic peripheral neuropathy (HCC)  -     Wound cleansing and dressings; Future        Procedures    Plan:  · Closed 100%, discharged home in stable condition  · Call if any signs of recurrent ulceration noted  · Follow-up with regular podiatrist for diabetic foot care as scheduled  · Recommend patient obtain new diabetic shoes in the near future  Wound 08/31/22 Diabetic Ulcer Foot Left;Plantar;Medial (Active)   Date First Assessed/Time First Assessed: 08/31/22 0839   Pre-Existing Wound: Yes  Primary Wound Type: Diabetic Ulcer  Location: Foot  Wound Location Orientation: Left;Plantar;Medial       [REMOVED] Wound 08/12/22 Foot Left (Removed)   Resolved Date/Resolved Time: 08/31/22 7117  Date First Assessed/Time First Assessed: 08/12/22 1827   Location: Foot  Wound Location Orientation: Left  Wound Description (Comments): 12"  of !//4" iodoform gauze placed in [id number]'sKlingAce Bandag    Subjective:           10/19/2022:  74year-old type 2 diabetic presents for follow-up evaluation of DFU left foot  Reports good progress with no drainage over the past 2 weeks  Denies any new pain/discomfort or shortness of breath  9/28/2022:  82-year-old type 2 diabetic presents for follow-up evaluation of chronic DFU left foot  Reports slow but steady progress with diminishing size  Denies any new pain or shortness of breath  9/14/2022:  74year-old type 2 diabetic seen today for follow-up evaluation DFU left great toe, denies any new problems or significant changes  Reports some progress noted with still smaller size of wound  8/31/2022:  71-year-old type 2 diabetic recently discharged [facility] after course of IV antibiotics and bedside debridement infected diabetic foot ulceration left great toe  Denies any new pain or discomfort and reports good progress with diminished erythema edema and drainage  The following portions of the patient's history were reviewed and updated as appropriate: allergies, current medications, past family history, past medical history, past social history, past surgical history and problem list     Review of Systems   Constitutional: Negative for chills and fever  HENT: Negative for ear pain and sore throat  Eyes: Negative for pain and visual disturbance  Respiratory: Negative for cough and shortness of breath  Cardiovascular: Negative for chest pain and palpitations  Gastrointestinal: Negative for abdominal pain and vomiting  Genitourinary: Negative for dysuria and hematuria  Musculoskeletal: Positive for gait problem  Negative for arthralgias and back pain  Skin: Positive for wound (Diabetic foot ulcer left great toe status post incision drainage)  Negative for color change and rash  Neurological: Positive for numbness  Negative for seizures and syncope  All other systems reviewed and are negative  Objective:            /78   Pulse 64   Temp 97 6 °F (36 4 °C)   Resp 16     Physical Exam  Constitutional:       Appearance: Normal appearance  HENT:      Head: Normocephalic  Right Ear: Tympanic membrane normal       Left Ear: Tympanic membrane normal       Nose: No congestion  Mouth/Throat:      Mouth: Mucous membranes are moist       Pharynx: No oropharyngeal exudate or posterior oropharyngeal erythema  Eyes:      Extraocular Movements: Extraocular movements intact        Conjunctiva/sclera: Conjunctivae normal       Pupils: Pupils are equal, round, and reactive to light  Cardiovascular:      Rate and Rhythm: Normal rate and regular rhythm  Pulses:           Dorsalis pedis pulses are 1+ on the right side and 1+ on the left side  Posterior tibial pulses are 0 on the right side and 0 on the left side  Pulmonary:      Effort: Pulmonary effort is normal    Abdominal:      Palpations: Abdomen is soft  Feet:      Right foot:      Protective Sensation: 10 sites tested  1 site sensed  Toenail Condition: Right toenails are abnormally thick  Left foot:      Protective Sensation: 10 sites tested  1 site sensed  Skin integrity: Ulcer (Left foot: SQ depth ulcerative breakdown plantar medial 1st MPJ   100% New skin formation noted with minimal hyperkeratotic eschar, no signs of infection ) present  Toenail Condition: Left toenails are abnormally thick  Skin:     General: Skin is warm and dry  Capillary Refill: Capillary refill takes 2 to 3 seconds  Coloration: Skin is not pale  Findings: No bruising, erythema, lesion or rash  Neurological:      General: No focal deficit present  Mental Status: He is alert  Cranial Nerves: No cranial nerve deficit  Sensory: No sensory deficit  Motor: No weakness  Gait: Gait normal       Deep Tendon Reflexes: Reflexes normal    Psychiatric:         Mood and Affect: Mood normal          Behavior: Behavior normal          Judgment: Judgment normal            Wound Instructions:  No orders of the defined types were placed in this encounter  No diagnosis found

## 2022-10-19 NOTE — PATIENT INSTRUCTIONS
Orders Placed This Encounter   Procedures    Wound cleansing and dressings     Left foot is healed today at appointment  Follow up with podiatrist for routine foot care       Standing Status:   Future     Standing Expiration Date:   10/19/2023

## 2022-10-20 ENCOUNTER — TELEPHONE (OUTPATIENT)
Dept: PAIN MEDICINE | Facility: MEDICAL CENTER | Age: 67
End: 2022-10-20

## 2022-10-20 NOTE — TELEPHONE ENCOUNTER
S/w pt's wife, she said the pt's L foot ulcer is 100% healed, confirmed with wound care note  Pt has already completed his psych eval with Khadar Burns and his t-spine MRI   Pt has an appt with Dr Jocelyn High on 11/16, does he need to complete that prior to us submitting insurance auth for the trial?

## 2022-10-20 NOTE — TELEPHONE ENCOUNTER
Caller: patients wife     Doctor: Toby Gustafson     Reason for call: pt wife stated that  was cleared from wound specialist that pt is ok to have his injection with [facility] , pt would like to schedule an injection     Call back#: [phone]

## 2022-10-20 NOTE — TELEPHONE ENCOUNTER
Pt had first psych eval visit with Katy Leon on 10/12- his second visit is on 10/26  Per michelle staff message, pt reported that he had not completed education with [employer] yet  I have a confirmation email from [address] [place] [address] @ [employer] from 08/11/22 that she completed education on phone w pt (bc he was in the hospital with family so that was more convenient at the time)  I called pt, to refresh his memory on the education call and had to [facility] for him to please call me back  I also emailed Bia to ask her to please reach out to pt again, since he seemingly forgot it was completed, and now has more questions about the SCS  I responded to Katy Leon and will let her know once I have confirmed with both pt and Bia they have spoken

## 2022-10-20 NOTE — TELEPHONE ENCOUNTER
"So glad to hear the wounds healed  Looked over T spine MRI stenosis seen at L1-L2  In a previous task Dr Laurel Guerra states \"Needs surgical evaluation prior to proceeding with any spinal cord stimulation secondary to the moderate stenosis at L1-2\"    So patient should see Dr Marshall Coles first  If he can call us after that appointment, we can see what Dr Marshall Coles recommends and know how to proceed     "

## 2022-10-25 ENCOUNTER — TELEPHONE (OUTPATIENT)
Dept: BEHAVIORAL/MENTAL HEALTH CLINIC | Facility: CLINIC | Age: 67
End: 2022-10-25

## 2022-10-25 NOTE — TELEPHONE ENCOUNTER
LVM for pt stating that session scheduled for 10/26 at 11:00 am needs to be r/s  Offered 10/28 at 3:15 either virtually or at [facility] office  Provided central scheduling CB #

## 2022-10-28 ENCOUNTER — SOCIAL WORK (OUTPATIENT)
Dept: BEHAVIORAL/MENTAL HEALTH CLINIC | Facility: CLINIC | Age: 67
End: 2022-10-28

## 2022-10-28 DIAGNOSIS — F31.81 BIPOLAR II DISORDER, MOST RECENT EPISODE MAJOR DEPRESSIVE (HCC): Primary | ICD-10-CM

## 2022-10-28 NOTE — PSYCH
Virtual Regular Visit    Verification of patient location:    Patient is located in the following state in which I hold an active license [place]      Assessment/Plan:    Problem List Items Addressed This Visit    None            Reason for visit is   Chief Complaint   Patient presents with   • Virtual Regular Visit        Encounter provider Caity Salguero    Provider located at [address] [place] [address] E  [address] [place] [address]  [phone]      Recent Visits  Date Type Provider Dept   10/25/22 Telephone Caity Salguero [address] [place] [address] Dr Quintero   Showing recent visits within past 7 days and meeting all other requirements  Future Appointments  No visits were found meeting these conditions  Showing future appointments within next 150 days and meeting all other requirements       The patient was identified by name and date of birth  Pk Louie was informed that this is a telemedicine visit and that the visit is being conducted throughthe Soniq[employer] platform  He agrees to proceed     My office door was closed  No one else was in the room  He acknowledged consent and understanding of privacy and security of the video platform  The patient has agreed to participate and understands they can discontinue the visit at any time  Patient is aware this is a billable service  Subjective  Pk Louie is a 74 y o  male presenting to this virtual sessions for the completion of the SCS implant Eval   Pt has not completed BHI 2 previously emailed  He has not completed info session re implant and has concerns related to whether the implant will interfere with his heart rate  Encouraged pt to complete info session to gain understanding of of procedure  Pt agrees to complete the BHI 2 this weekend and will reach out to schedule the Info Session      SOCIAL HISTORY   Significant relationships/Support Network: Wife, Mandaen   Cultural Practices Orthodoxy/Spiritual Beliefs pertinent to treatment: Denies   Additional pertinent historical information includes:    Patient-Identified Strengths: Perseverance, patience, carli and willingness to follow God   Hobbies/Interests: Restoring classic car with son      MENTAL HEALTH HISTORY    Prior inpatient treatment: Denies   Prior outpatient treatment: Saw therapist at [facility] for 20 plus years for depression and anxiety   Recently had to change therapists due to insurance issues and he is scheduled to meet new therapist next week      Prior [address] diagnoses: PTSD, Anxiety and depression   Psychotropic medications: Recently re-started [address] ideation/attempts: Denies   Current self-injurious behavior/ideation: Denies   Past assaults/violent ideation/behavior: Denies   Current violent ideation/behavior: Denies   History of childhood trauma: Denies   Physical abuse: Denies   Sexual abuse/assault: Denies   Domestic violence witness: Denies      FAMILY HISTORY   Family mental health history: Denies   Family substance abuse history: Denies      COMPLIANCE    History of current and past compliance with medical care: Compliant   Medication compliance: Compliant   Appointment compliance: Compliant   Diet/Exercise compliance: Mostly compliant with eating and activity recommendations re diabetes   Cooperation with staff while in hospital: Cooperative    Cooperation with current evaluation: Pleasant      BIOPSYCHOSOCIAL RISK FACTORS FOR CONSIDERATION:     FINANCIAL    ___ Pending Litigation    ___ Application for disability payments/Worker's compensation   _X_  NA      VOCATIONAL:    ___ Physically demanding job   ___ Moderate job dissatisfaction   ___ Extreme job dissatisfaction   _X_ NA      SUBSTANCE USE/ABUSE   ___ Preinjury    ___ Current medication abuse   ___  Alcohol abuse   _X_ NA      MARITAL INTERACTION   ___ Marital dissatisfaction   ___ Spousal reinforcement of pain behavior   _X_ NA     Pt appears to have some confusion related to the multiple providers involved with his care  He seems depressed and self rejecting  He struggles to provide direct answers to inquiries and speech is intermittently circumstantial          HPI     Past Medical History:   Diagnosis Date   • Anxiety    • Arthritis    • Cancer ([facility] [address] [address] )    • Depression    • Diabetes mellitus ([facility] )    • Hypertension    • Liver disease    • Mitral valve prolapse    • Thyroid disease        Past Surgical History:   Procedure Laterality Date   • INCISION AND DRAINAGE OF WOUND Left 8/12/2022    Procedure: INCISION AND DRAINAGE (I&D) EXTREMITY;  Surgeon: Brinda Raymundo DPM;  Location:  MAIN OR;  Service: Podiatry   • JOINT REPLACEMENT Left 03/11/2022    Left TSA   • THYROID SURGERY  2021    remove cancer       Current Outpatient Medications   Medication Sig Dispense Refill   • ACCU-CHEK FABIANO PLUS test strip 4 (four) times a day  1   • ACCU-CHEK FASTCLIX LANCETS MISC 4 (four) times a day  1   • celecoxib (CeleBREX) 200 mg capsule Take 1 PO BID PRN for pain with food and NO other NSAIDS  60 capsule 1   • cetirizine (ZyrTEC) 10 mg tablet Take 10 mg by mouth daily  2   • Cholecalciferol 1 25 MG (53858 UT) TABS Take 1 tablet by mouth daily     • docusate sodium (COLACE) 100 mg capsule Take 1 capsule (100 mg total) by mouth 2 (two) times a day (Patient taking differently: Take 1 capsule by mouth 2 (two) times a day   taking as needed  Indications: Constipation) 14 capsule 0   • DULoxetine (CYMBALTA) 60 mg delayed release capsule Take 60 mg by mouth 2 (two) times a day not taking     • folic acid (FOLVITE) 1 mg tablet Take 2,000 mcg by mouth daily  6   • GLOBAL INJECT EASE INSULIN SYR 31G X 5/16" 1 ML MISC 2 (two) times a day  0   • GNP ASPIRIN LOW DOSE 81 MG EC tablet Take 81 mg by mouth daily  0   • hydrOXYzine pamoate (VISTARIL) 25 mg capsule Take 50 mg by mouth daily at bedtime     • ibuprofen (MOTRIN) 400 mg tablet Take 800 mg by mouth 3 (three) times a day not taking  1   • Jardiance 10 MG TABS Take 10 mg by mouth daily     • ketoconazole (NIZORAL) 2 % shampoo daily Use as directed  6   • Lantus SoloStar 100 units/mL SOPN Inject 37 mg under the skin 2 (two) times a day with meals     • LORazepam (ATIVAN) 1 mg tablet Take 1 mg by mouth 2 (two) times a day as needed     • losartan-hydrochlorothiazide (HYZAAR) 100-25 MG per tablet Take 1 tablet by mouth daily  0   • lovastatin (MEVACOR) 20 mg tablet Take 20 mg by mouth daily  3   • metFORMIN (GLUCOPHAGE) 1000 MG tablet Take 1,000 mg by mouth 2 (two) times a day with meals      • metoprolol succinate (Toprol XL) 50 mg 24 hr tablet Take 50 mg by mouth daily  Indications: High Blood Pressure Disorder     • mirtazapine (REMERON) 45 MG tablet Take 45 mg by mouth daily at bedtime  1   • NIFEdipine (PROCARDIA XL) 30 mg 24 hr tablet TAKE 1 TABLET BY MOUTH DAILY in addition TO 90mg FOR A total OF 120mg DAILY     • NIFEdipine (PROCARDIA XL) 90 mg 24 hr tablet Take 90 mg by mouth daily  3   • polyethylene glycol (MIRALAX) 17 g packet Take 17 g by mouth daily as needed (constipation) 10 each 0   • senna (SENOKOT) 8 6 mg Take 2 tablets (17 2 mg total) by mouth 2 (two) times a day (Patient taking differently: Take 2 tablets by mouth 2 (two) times a day  as needed  Indications: Constipation) 28 tablet 0   • sertraline (ZOLOFT) 25 mg tablet Take 100 mg by mouth daily     • tiZANidine (ZANAFLEX) 2 mg tablet Take 1 PO QID PRN for pain and spasms  60 tablet 1     No current facility-administered medications for this visit  Allergies   Allergen Reactions   • Cephalexin Rash   • Abilify [Aripiprazole] Other (See Comments)     Shaking     • Molds & Smuts        Review of Systems   Psychiatric/Behavioral: Positive for confusion and dysphoric mood  Video Exam    There were no vitals filed for this visit      Physical Exam  Psychiatric: Attention and Perception: Attention and perception normal          Mood and Affect: Mood is depressed  Affect is blunt  Speech: Speech normal          Behavior: Behavior normal  Behavior is cooperative  Thought Content:  Thought content normal          Cognition and Memory: Cognition and memory normal          Judgment: Judgment normal           Visit Time    Visit Start Time: 3:18 pm  Visit Stop Time: 3:48 PM  Total Visit Duration: 30 minutes

## 2022-10-28 NOTE — PATIENT INSTRUCTIONS
Continue to take all medications as prescribed  Attend all scheduled medical appointments    Follow up with therapist     If you are experiencing a mental health emergency, please call 911 for emergent care, or one of the following numbers for someone to talk to 24 hours a day, 7 days a week:  [facility] Intervention:  [address] Crisis Intervention: [facility] Crisis Intervention: 867.822.6580  [address]:  Text [place] to 835228  [place]'s support and referral hotline: 694.626.6401  Suicide Prevention Lifeline:  211.551.4948

## 2022-11-08 ENCOUNTER — TELEPHONE (OUTPATIENT)
Dept: BEHAVIORAL/MENTAL HEALTH CLINIC | Facility: CLINIC | Age: 67
End: 2022-11-08

## 2022-11-08 NOTE — TELEPHONE ENCOUNTER
LVM for pt requesting pt to check email's trash, spam and other folders for link sent by [employer] for BHI 2 assessment  Provided CB number

## 2022-11-15 ENCOUNTER — TELEMEDICINE (OUTPATIENT)
Dept: BEHAVIORAL/MENTAL HEALTH CLINIC | Facility: CLINIC | Age: 67
End: 2022-11-15

## 2022-11-15 DIAGNOSIS — F31.81 BIPOLAR II DISORDER, MOST RECENT EPISODE MAJOR DEPRESSIVE (HCC): Primary | ICD-10-CM

## 2022-11-15 DIAGNOSIS — F32.A ANXIETY AND DEPRESSION: ICD-10-CM

## 2022-11-15 DIAGNOSIS — F41.9 ANXIETY AND DEPRESSION: ICD-10-CM

## 2022-11-15 NOTE — PSYCH
Virtual Regular Visit    Verification of patient location:    Patient is located in the following state in which I hold an active license [place]      Assessment/Plan:    Problem List Items Addressed This Visit    None  Visit Diagnoses     Bipolar II disorder, most recent episode major depressive ([facility] [address] )    -  Primary    Anxiety and depression              Goals addressed in session: Goal 1          Reason for visit is No chief complaint on file  Encounter provider Nirmala Hernández, PhD    Provider located at [address] [place] [id number] [phone]      Recent Visits  Date Type Provider Dept   11/15/22 Telemedicine Nirmala Hernández, PhD Luis A Weber recent visits within past 7 days and meeting all other requirements  Future Appointments  No visits were found meeting these conditions  Showing future appointments within next 150 days and meeting all other requirements       The patient was identified by name and date of birth  Osvaldo Funk was informed that this is a telemedicine visit and that the visit is being conducted throughthe Naut[employer] platform  He agrees to proceed     My office door was closed  No one else was in the room  He acknowledged consent and understanding of privacy and security of the video platform  The patient has agreed to participate and understands they can discontinue the visit at any time  Patient is aware this is a billable service  Subjective  Osvaldo Fukn is a 74 y o  male     D:  Reached client on [employer]  Client stated that he continues to struggle with depression and anxiety  He is worried about his health, and about his children  He has ongoing pain issues and will see a doctor tomorrow about a nerve stimulator  He is trying to use minimal pain meds, and to minimize any surgery    He talked about many deaths in the past couple years, and how he is not sure if he has grieved for any or all of these family members  "Lost dad and brother; niece in a car accident; Lost uncles, aunts, cousins  In last 3 years lost 12 people in our family  Most were old, but it's hard  My son who   There are 8 of us left - brothers and sisters " Provided empathy and support  Talked about coping skills, ways to honor the memory of loved ones, the use of positive self statements  Client relies on his carli as part of his daily coping, and explored how carli, prayer, and scripture reading supported him  A:  I have 5 herniated discs  Recently they spotted something at the bottom of my spinal cord - there nerves are pinched and at the top they are pinched  The pain runs up my nerves - down hip and down leg  Can't stand for too long, can't stand, sit, can't lay on my side, can't lay on my tummy  Tomorrow I have a consultation with a neurologist about a stimulator  I feel anxious and depressed and panicky  I listen to the prayer channels on youtube  Client was casually dressed, friendly and cooperative  He was alert and oriented x3  Mood was low, affect appropriate to situation  Thought process was logical   No issues with SI or HI  Insight and judgment were fair  Client acknowledged some SI in the past, but not attempts  Talked about his late brother, who used to come to Thanksgiving  Mom  8 years, sister Julia  11 years later, father , brother Barbi Espinal  2 weeks later  Dyllan Singh - a "general in [address] [place] [address] "  Kleber Medina and his wife are very good friends of ours  I was healed of alcohol overnight - I was at [facility][de-identified]  Return in 2-3 weeks  Client will begin journal about family members - start with one family member, get copy of picture in book, and write any positive memories he has about her  Continue to visit graves and bring flowers as he has been doing   Make list of positive affirmations/ positive quotes/ verses as way to encourage self, and to lay the foundations for a positive thought network  HPI     Past Medical History:   Diagnosis Date   • Anxiety    • Arthritis    • Cancer ([facility] [address] )    • Depression    • Diabetes mellitus ([facility] [address] )    • Hypertension    • Liver disease    • Mitral valve prolapse    • Thyroid disease        Past Surgical History:   Procedure Laterality Date   • INCISION AND DRAINAGE OF WOUND Left 8/12/2022    Procedure: INCISION AND DRAINAGE (I&D) EXTREMITY;  Surgeon: Shyanne Varghese DPM;  Location:  MAIN OR;  Service: Podiatry   • JOINT REPLACEMENT Left 03/11/2022    Left TSA   • THYROID SURGERY  2021    remove cancer       Current Outpatient Medications   Medication Sig Dispense Refill   • ACCU-CHEK FABIANO PLUS test strip 4 (four) times a day  1   • ACCU-CHEK FASTCLIX LANCETS MISC 4 (four) times a day  1   • Acetaminophen (TYLENOL EXTRA STRENGTH PO) Take 3 tablets by mouth every 6 (six) hours     • celecoxib (CeleBREX) 200 mg capsule Take 1 PO BID PRN for pain with food and NO other NSAIDS   60 capsule 1   • cetirizine (ZyrTEC) 10 mg tablet Take 10 mg by mouth daily  2   • CHOLECALCIFEROL PO Take 5,000 Units by mouth daily     • clonazePAM (KlonoPIN) 1 mg tablet Take 1 mg by mouth 2 (two) times a day     • docusate sodium (COLACE) 100 mg capsule Take 1 capsule (100 mg total) by mouth 2 (two) times a day (Patient not taking: Reported on 11/16/2022) 14 capsule 0   • DULoxetine (CYMBALTA) 60 mg delayed release capsule Take 60 mg by mouth 2 (two) times a day not taking (Patient not taking: Reported on 31/75/4849)     • folic acid (FOLVITE) 1 mg tablet Take 2,000 mcg by mouth daily  6   • GLOBAL INJECT EASE INSULIN SYR 31G X 5/16" 1 ML MISC 2 (two) times a day  0   • GNP ASPIRIN LOW DOSE 81 MG EC tablet Take 81 mg by mouth daily  0   • hydrOXYzine pamoate (VISTARIL) 25 mg capsule Take 50 mg by mouth daily at bedtime     • ibuprofen (MOTRIN) 400 mg tablet Take 800 mg by mouth 3 (three) times a day not taking (Patient not taking: Reported on 11/16/2022)  1   • Jardiance 10 MG TABS Take 10 mg by mouth daily     • ketoconazole (NIZORAL) 2 % shampoo daily Use as directed  6   • Lantus SoloStar 100 units/mL SOPN Inject 37 mg under the skin 2 (two) times a day with meals     • LORazepam (ATIVAN) 1 mg tablet Take 1 mg by mouth 2 (two) times a day as needed (Patient not taking: Reported on 11/16/2022)     • losartan-hydrochlorothiazide (HYZAAR) 100-25 MG per tablet Take 1 tablet by mouth daily  0   • lovastatin (MEVACOR) 20 mg tablet Take 20 mg by mouth daily  3   • metFORMIN (GLUCOPHAGE) 1000 MG tablet Take 1,000 mg by mouth 2 (two) times a day with meals      • metoprolol succinate (TOPROL-XL) 50 mg 24 hr tablet Take 50 mg by mouth daily  Indications: High Blood Pressure Disorder     • mirtazapine (REMERON) 45 MG tablet Take 45 mg by mouth daily at bedtime  1   • NIFEdipine (PROCARDIA XL) 30 mg 24 hr tablet TAKE 1 TABLET BY MOUTH DAILY in addition TO 90mg FOR A total OF 120mg DAILY     • NIFEdipine (PROCARDIA XL) 90 mg 24 hr tablet Take 90 mg by mouth daily  3   • patient supplied medication [employer] leg and back pain relief po, 2 tabs, 4x daily     • polyethylene glycol (MIRALAX) 17 g packet Take 17 g by mouth daily as needed (constipation) (Patient not taking: Reported on 11/16/2022) 10 each 0   • senna (SENOKOT) 8 6 mg Take 2 tablets (17 2 mg total) by mouth 2 (two) times a day (Patient not taking: Reported on 11/16/2022) 28 tablet 0   • sertraline (ZOLOFT) 25 mg tablet Take 100 mg by mouth daily     • tiZANidine (ZANAFLEX) 2 mg tablet Take 1 PO QID PRN for pain and spasms  60 tablet 1     No current facility-administered medications for this visit  Allergies   Allergen Reactions   • Cephalexin Rash   • Abilify [Aripiprazole] Other (See Comments)     Shaking     • Molds & Smuts        Review of Systems    Video Exam    There were no vitals filed for this visit      Physical Exam     Visit Time    Am11/16/22  Start Time: 7214  Stop Time: 1401  Total Visit Time: 56 minutes

## 2022-11-16 ENCOUNTER — OFFICE VISIT (OUTPATIENT)
Dept: NEUROSURGERY | Facility: CLINIC | Age: 67
End: 2022-11-16

## 2022-11-16 ENCOUNTER — TELEPHONE (OUTPATIENT)
Dept: PAIN MEDICINE | Facility: CLINIC | Age: 67
End: 2022-11-16

## 2022-11-16 VITALS
BODY MASS INDEX: 27.63 KG/M2 | SYSTOLIC BLOOD PRESSURE: 128 MMHG | WEIGHT: 193 LBS | TEMPERATURE: 96.2 F | RESPIRATION RATE: 16 BRPM | DIASTOLIC BLOOD PRESSURE: 70 MMHG | HEIGHT: 70 IN | HEART RATE: 74 BPM

## 2022-11-16 DIAGNOSIS — M47.816 LUMBAR SPONDYLOSIS: Primary | ICD-10-CM

## 2022-11-16 RX ORDER — CLONAZEPAM 1 MG/1
1 TABLET ORAL 2 TIMES DAILY
COMMUNITY
Start: 2022-10-24

## 2022-11-16 NOTE — TELEPHONE ENCOUNTER
Caller: Gómez Partida    Doctor: Manuel Lau    Reason for call:  Patients wife called stating that the patient wants to proceed with the stimulator trial after seeing Dr Artis President on 11/16    Patient is having MRI on 12/2        Call back#: [phone]

## 2022-11-16 NOTE — PROGRESS NOTES
Neurosurgery Office Note  Jeffery Ricketts 74 y o  male MRN: [id number]      Assessment/Plan     Lumbar spondylosis  Patient seen for evaluation of back pain prior to spinal cord stimulator trial   • Follows with Dr Gary Ribera with plans for SCS trial  T-spine MRI suggested L1-2 left sided disc herniation with canal and foraminal stenosis  • He has chronic, constant 10/10 LBP for 2 years, worsened in August 2022 after a hospitalization  Wraps around from his back to his waist at his belt-line  The pain also radiates up his back and down both buttocks and hips  It does not radiate into the groin or BLEs, no numbness in the genitals  No BBI  Baseline neuropathy from diabetes  • Last hemoglobin A1c was 6 5 and recently healed a DFU  Failed PT, EBER and MBB  • Exam: low back TTP, HF 4/5 bilaterally d/t pain, otherwise 5/5 with intact sensation, 2+ DTR, no clonus     Imaging:   · MRI T-spine 9/6/22:Degenerative change at the upper thoracic spine, T1-T2 and within the upper lumbar spine at the inferior extent of this exam at the L1-L2 levels  Both levels demonstrate significant foraminal narrowing  There is moderate canal stenosis at the L1-2 level as a result of disc and endplate hypertrophic change as well as left-sided disc herniation  · T-spine xrays 8/26/22: No acute osseous abnormality  · L-spine xrays 8/26/22: Straightening with multilevel moderate to severe degenerative changes  Plan:   • Reviewed imaging with patient, wife and Dr Caroline Combs  He has degenerative changes with disc herniation in the visualized lumbar spine, however not completed evaluated on thoracic spine MRI  • Would like to further evaluate his lumbar spine disease  Lumbar spine flexion/extension xrays ordered as well as MRI lumbar spine wo  • Ok to continue evaluation for spinal cord stimulator  • Follow up as snpx with Dr Caroline Combs once imaging completed  • Reviewed red flag s/s  • Call with any questions or concerns            Diagnoses and all orders for this visit:    Lumbar spondylosis  -     XR spine lumbar complete w bending minimum 6 views; Future  -     MRI lumbar spine wo contrast; Future    Other orders  -     Acetaminophen (TYLENOL EXTRA STRENGTH PO); Take 3 tablets by mouth every 6 (six) hours  -     patient supplied medication; [employer] leg and back pain relief po, 2 tabs, 4x daily  -     clonazePAM (KlonoPIN) 1 mg tablet; Take 1 mg by mouth 2 (two) times a day          I spent 30 minutes with the patient today in which >50% of the time was spent counseling/coordination of care regarding diagnosis, imaging review, symptoms and treatment plan  CHIEF COMPLAINT    Chief Complaint   Patient presents with   • Consult     Lumbar spine evaluation       HISTORY    History of Present Illness     74y o  year old male     74year old gentleman seen for new evaluation of low back pain prior to SCS trial with L1-2 degenerative changes  He has had chronic, constant LBP for 2 years, but it worsened in August 2022 after a hospitalization  The pain is a 10/10 and wraps around from his back to his waist at his belt-line  The pain also radiates up his back and down both buttocks and hips  It does not radiate into the groin, no numbness in the genitals  No BBI  He has no radiating pain in his legs, no numbness or tingling  He has chronic, baseline neuropathy in both feet from diabetes  It is hard for him to get out of a chair due to back pain and weakness vs pain in his hips  Made worse with standing too long, sitting too long  He is unable to lay flat and sleeps in a recliner, but has to change positions frequently due to pain  Pain medications just take the edge off  Last hemoglobin A1c was 6 5 and recently healed a DFU  He does wear an LSO brace which he feels helps his symptoms  He is seen 2 different pain management physicians and has had previous EBRE as well as MBB  The medial branch block helped for about 2 days    He has tried PT         See Discussion    REVIEW OF SYSTEMS    Review of Systems   Gastrointestinal: Positive for abdominal pain  Endocrine: Positive for cold intolerance  Genitourinary:        Some incontinence  Some trouble emptying/finishing, feels pressure in back and waist   Musculoskeletal: Positive for back pain (constant low back pain, radiates up spine and wraps around to front  Occasionally radiates to buttocks and posterior legs) and gait problem (using rolling walker, fell this morning)  Wearing brace   Allergic/Immunologic: Positive for environmental allergies  Neurological: Positive for weakness (legs) and numbness (tingling lower back wraps around sided)  Psychiatric/Behavioral: Positive for decreased concentration, dysphoric mood and sleep disturbance  The patient is nervous/anxious  All other systems reviewed and are negative  Meds/Allergies     Current Outpatient Medications   Medication Sig Dispense Refill   • Acetaminophen (TYLENOL EXTRA STRENGTH PO) Take 3 tablets by mouth every 6 (six) hours     • celecoxib (CeleBREX) 200 mg capsule Take 1 PO BID PRN for pain with food and NO other NSAIDS   60 capsule 1   • cetirizine (ZyrTEC) 10 mg tablet Take 10 mg by mouth daily  2   • CHOLECALCIFEROL PO Take 5,000 Units by mouth daily     • clonazePAM (KlonoPIN) 1 mg tablet Take 1 mg by mouth 2 (two) times a day     • folic acid (FOLVITE) 1 mg tablet Take 2,000 mcg by mouth daily  6   • GNP ASPIRIN LOW DOSE 81 MG EC tablet Take 81 mg by mouth daily  0   • hydrOXYzine pamoate (VISTARIL) 25 mg capsule Take 50 mg by mouth daily at bedtime     • Jardiance 10 MG TABS Take 10 mg by mouth daily     • ketoconazole (NIZORAL) 2 % shampoo daily Use as directed  6   • Lantus SoloStar 100 units/mL SOPN Inject 37 mg under the skin 2 (two) times a day with meals     • losartan-hydrochlorothiazide (HYZAAR) 100-25 MG per tablet Take 1 tablet by mouth daily  0   • lovastatin (MEVACOR) 20 mg tablet Take 20 mg by mouth daily  3   • metFORMIN (GLUCOPHAGE) 1000 MG tablet Take 1,000 mg by mouth 2 (two) times a day with meals      • metoprolol succinate (TOPROL-XL) 50 mg 24 hr tablet Take 50 mg by mouth daily  Indications: High Blood Pressure Disorder     • mirtazapine (REMERON) 45 MG tablet Take 45 mg by mouth daily at bedtime  1   • NIFEdipine (PROCARDIA XL) 30 mg 24 hr tablet TAKE 1 TABLET BY MOUTH DAILY in addition TO 90mg FOR A total OF 120mg DAILY     • NIFEdipine (PROCARDIA XL) 90 mg 24 hr tablet Take 90 mg by mouth daily  3   • patient supplied medication [employer] leg and back pain relief po, 2 tabs, 4x daily     • sertraline (ZOLOFT) 25 mg tablet Take 100 mg by mouth daily     • tiZANidine (ZANAFLEX) 2 mg tablet Take 1 PO QID PRN for pain and spasms  60 tablet 1   • ACCU-CHEK FABIANO PLUS test strip 4 (four) times a day  1   • ACCU-CHEK FASTCLIX LANCETS MISC 4 (four) times a day  1   • docusate sodium (COLACE) 100 mg capsule Take 1 capsule (100 mg total) by mouth 2 (two) times a day (Patient not taking: Reported on 11/16/2022) 14 capsule 0   • DULoxetine (CYMBALTA) 60 mg delayed release capsule Take 60 mg by mouth 2 (two) times a day not taking (Patient not taking: Reported on 11/16/2022)     • GLOBAL INJECT EASE INSULIN SYR 31G X 5/16" 1 ML MISC 2 (two) times a day  0   • ibuprofen (MOTRIN) 400 mg tablet Take 800 mg by mouth 3 (three) times a day not taking (Patient not taking: Reported on 11/16/2022)  1   • LORazepam (ATIVAN) 1 mg tablet Take 1 mg by mouth 2 (two) times a day as needed (Patient not taking: Reported on 11/16/2022)     • polyethylene glycol (MIRALAX) 17 g packet Take 17 g by mouth daily as needed (constipation) (Patient not taking: Reported on 11/16/2022) 10 each 0   • senna (SENOKOT) 8 6 mg Take 2 tablets (17 2 mg total) by mouth 2 (two) times a day (Patient not taking: Reported on 11/16/2022) 28 tablet 0     No current facility-administered medications for this visit         Allergies   Allergen Reactions   • Cephalexin Rash   • Abilify [Aripiprazole] Other (See Comments)     Shaking     • Molds & Smuts        PAST HISTORY    Past Medical History:   Diagnosis Date   • Anxiety    • Arthritis    • Cancer ([facility] [address] )    • Depression    • [facility] )    • Hypertension    • Liver disease    • Mitral valve prolapse    • Thyroid disease        Past Surgical History:   Procedure Laterality Date   • INCISION AND DRAINAGE OF WOUND Left 2022    Procedure: INCISION AND DRAINAGE (I&D) EXTREMITY;  Surgeon: Leticia Dixon DPM;  Location:  MAIN OR;  Service: Podiatry   • JOINT REPLACEMENT Left 2022    Left TSA   • THYROID SURGERY      remove cancer       Social History     Tobacco Use   • Smoking status: Former     Packs/day: 0 25     Types: Cigarettes     Quit date:      Years since quittin 9   • Smokeless tobacco: Never   • Tobacco comments:     quit    Vaping Use   • Vaping Use: Never used   Substance Use Topics   • Alcohol use: Yes     Comment: occasional, social   • Drug use: Not Currently     Types: Marijuana       History reviewed  No pertinent family history  Above history personally reviewed  EXAM    Vitals:Blood pressure 128/70, pulse 74, temperature (!) 96 2 °F (35 7 °C), temperature source Tympanic, resp  rate 16, height 5' 10" (1 778 m), weight 87 5 kg (193 lb)  ,Body mass index is 27 69 kg/m²  Physical Exam  Vitals reviewed  Constitutional:       General: He is awake  Appearance: Normal appearance  HENT:      Head: Normocephalic and atraumatic  Eyes:      Conjunctiva/sclera: Conjunctivae normal    Cardiovascular:      Rate and Rhythm: Normal rate  Pulmonary:      Effort: Pulmonary effort is normal    Musculoskeletal:         General: Tenderness present  Comments: Midline spine low back TTP, tenderness in paraspinal muscles  Limited ROM  Skin:     General: Skin is warm and dry     Neurological:      Mental Status: He is alert and oriented to person, place, and time  Deep Tendon Reflexes:      Reflex Scores:       Patellar reflexes are 2+ on the right side and 2+ on the left side  Psychiatric:         Attention and Perception: Attention and perception normal          Mood and Affect: Mood and affect normal          Speech: Speech normal          Behavior: Behavior normal  Behavior is cooperative  Thought Content: Thought content normal          Cognition and Memory: Cognition and memory normal          Judgment: Judgment normal          Neurologic Exam     Mental Status   Oriented to person, place, and time  Follows 2 step commands  Attention: normal  Concentration: normal    Speech: speech is normal   Knowledge: good  Normal comprehension  Motor Exam   Bilateral HF 4/5 due to pain, otherwise 5/5 throughout     Sensory Exam   Right leg light touch: normal  Left leg light touch: normal    Gait, Coordination, and Reflexes     Reflexes   Right patellar: 2+  Left patellar: 2+  Right ankle clonus: absent  Left ankle clonus: absent  Antalgic gait with walker  MEDICAL DECISION MAKING    Imaging Studies:     No results found  I have personally reviewed pertinent reports     and I have personally reviewed pertinent films in PACS

## 2022-11-16 NOTE — ASSESSMENT & PLAN NOTE
Patient seen for evaluation of back pain prior to spinal cord stimulator trial   • Follows with Dr Tank Pacheco with plans for SCS trial  T-spine MRI suggested L1-2 left sided disc herniation with canal and foraminal stenosis  • He has chronic, constant 10/10 LBP for 2 years, worsened in August 2022 after a hospitalization  Wraps around from his back to his waist at his belt-line  The pain also radiates up his back and down both buttocks and hips  It does not radiate into the groin or BLEs, no numbness in the genitals  No BBI  Baseline neuropathy from diabetes  • Last hemoglobin A1c was 6 5 and recently healed a DFU  Failed PT, EBER and MBB  • Exam: low back TTP, HF 4/5 bilaterally d/t pain, otherwise 5/5 with intact sensation, 2+ DTR, no clonus     Imaging:   · MRI T-spine 9/6/22:Degenerative change at the upper thoracic spine, T1-T2 and within the upper lumbar spine at the inferior extent of this exam at the L1-L2 levels  Both levels demonstrate significant foraminal narrowing  There is moderate canal stenosis at the L1-2 level as a result of disc and endplate hypertrophic change as well as left-sided disc herniation  · T-spine xrays 8/26/22: No acute osseous abnormality  · L-spine xrays 8/26/22: Straightening with multilevel moderate to severe degenerative changes  Plan:   • Reviewed imaging with patient, wife and Dr Tawny Kim  He has degenerative changes with disc herniation in the visualized lumbar spine, however not completed evaluated on thoracic spine MRI  • Would like to further evaluate his lumbar spine disease  Lumbar spine flexion/extension xrays ordered as well as MRI lumbar spine wo  • Ok to continue evaluation for spinal cord stimulator  • Follow up as snpx with Dr Tawny Kim once imaging completed  • Reviewed red flag s/s  • Call with any questions or concerns

## 2022-11-17 NOTE — TELEPHONE ENCOUNTER
S/w pt's wife, pt saw Dr Morris Marroquin and pt would like to proceed with the SCS trial, pt has completed the psych eval and his MRI is scheduled for 12/2 (RN informed pt's wife we will call to review those results)  Karley Carson, can you clarify which company we will be using? Pt's wife also asked if there is anything that the patient can take for the pain in the mean time? Pt taking Voltaren, Celebrex and Tizanidine with no relief of R sided low back pain  Pt has recently tried and failed Lyrica d/t side effects

## 2022-11-17 NOTE — TELEPHONE ENCOUNTER
[employer]    Please ask if patient still has an open wound on the left foot  If yes, we need to wait on trial until that is healed    We dont have much to offer  That's why we are trying the stim  He's tried and failed gabapentin, lyrica, Cymbalta  He is on zoloft so I cant try other anti-depressants for pain  No opioids (see ov note)  He can increase the tizandine 2 mg to 2 tabs

## 2022-11-17 NOTE — TELEPHONE ENCOUNTER
S/w Perry Thomas, confirmed the pt's wound on his L foot has healed  Kalpana Martinez, medication options are very limited  Ok to increase tizanidine to 2 pills as per NM  This may cause drowsiness, please cb if se's or questions arise  Perry Thomas verbalized understanding and appreciation  Will proceed as directed

## 2022-11-21 ENCOUNTER — TELEPHONE (OUTPATIENT)
Dept: BEHAVIORAL/MENTAL HEALTH CLINIC | Facility: CLINIC | Age: 67
End: 2022-11-21

## 2022-11-21 NOTE — TELEPHONE ENCOUNTER
LVM on home number for pt requesting return call re NELLIE benavides   Attempted to contact pt via mobile, msg states number no longer in service

## 2022-12-02 ENCOUNTER — HOSPITAL ENCOUNTER (OUTPATIENT)
Dept: MRI IMAGING | Facility: HOSPITAL | Age: 67
Discharge: HOME | End: 2022-12-02

## 2022-12-02 ENCOUNTER — HOSPITAL ENCOUNTER (OUTPATIENT)
Dept: RADIOLOGY | Facility: HOSPITAL | Age: 67
Discharge: HOME | End: 2022-12-02

## 2022-12-02 DIAGNOSIS — M47.816 LUMBAR SPONDYLOSIS: ICD-10-CM

## 2022-12-06 ENCOUNTER — TELEMEDICINE (OUTPATIENT)
Dept: BEHAVIORAL/MENTAL HEALTH CLINIC | Facility: CLINIC | Age: 67
End: 2022-12-06

## 2022-12-06 DIAGNOSIS — F31.81 BIPOLAR II DISORDER, MOST RECENT EPISODE MAJOR DEPRESSIVE (HCC): Primary | ICD-10-CM

## 2022-12-07 ENCOUNTER — OFFICE VISIT (OUTPATIENT)
Dept: NEUROSURGERY | Facility: CLINIC | Age: 67
End: 2022-12-07

## 2022-12-07 VITALS
HEART RATE: 89 BPM | HEIGHT: 70 IN | TEMPERATURE: 97 F | WEIGHT: 194 LBS | BODY MASS INDEX: 27.77 KG/M2 | SYSTOLIC BLOOD PRESSURE: 146 MMHG | RESPIRATION RATE: 16 BRPM | DIASTOLIC BLOOD PRESSURE: 78 MMHG

## 2022-12-07 DIAGNOSIS — C73 THYROID CANCER (HCC): ICD-10-CM

## 2022-12-07 DIAGNOSIS — E11.9 DM (DIABETES MELLITUS) (HCC): Primary | ICD-10-CM

## 2022-12-07 DIAGNOSIS — G89.4 CHRONIC PAIN SYNDROME: ICD-10-CM

## 2022-12-07 DIAGNOSIS — M47.816 LUMBAR SPONDYLOSIS: ICD-10-CM

## 2022-12-07 RX ORDER — CYCLOBENZAPRINE HCL 10 MG
10 TABLET ORAL
COMMUNITY
Start: 2022-11-29

## 2022-12-07 NOTE — PROGRESS NOTES
Assessment/Plan:    Chronic pain syndrome  · As addressed in HPI    Lumbar spondylosis  · As addressed in HPI   · Reports symptoms and history consistent with chronic pain syndrome     Patient is more interested in undergoing a SCS trial than pursuing with decompression /fusion surgery at this time  · Explained he will return to  neurosurgery either way if SCS is efficacious or not for permanent implant or for further discussion with Dr Tawny Kim for decompression fusion  · Is wearing and LSO brace, reports ordered by his PCP   · Surgery barriers -DM2 uncontrolled recent HgA1C  )ctober 20 2022 7 7, 8/10/22 6 5  · Continued with bilateral hip pain during ROM and limitation in ROM secondary to pain  Imagining   · 12/6/22 MRI Lumbar spine Multifocal marrow edema L1, L2 and superior endplate of L3 with a Schmorl's node indicative of type I Modic endplate changes with acute Schmorl's node  Discitis/osteomyelitis at L1-2 is thought to be less likely given the lack of T2 hyperintensity within the intervertebral disc space  Does the patient have osteoporotic risk factors? Further clinical assessment recommended  2   Advanced multilevel spondylosis as described with multilevel disc herniations  · Imagining impression questions does patient have osteoporotic risk factors  Patient He reports a history of thyroid cancer and surgery in 2020 Right thyroid, subtotal thyroidectomy, he also has a history of alcoholic cirrhosis  In imagining review l1 appears to have a wedge shaped abnormality  SO reports patient has recurrent falls  L1 vertebral body with a wedge appearance / marrow edema  · 12/6/22 Xay Lumbar spine fex/ext/oblique- Lumbar lordosis straightening  Marked degenerative lumbar spine  slight retrolisthesis  No acute osseous abnormality no motion abnormality flexion/extension  Plan   · Reviewed imagining in detail with patient and s/o    · Advised t contact PM, Dr Tank Pacheco request appointment for [facility] trial/further discussion  · If worsening symptoms of pain contact neurosurgery for sooner f/u  · Reviewed red flag symptoms if develop contact the office   · If additional questions or concerns call neurosurgery  · Ordered DXA scan, will review result at [facility] appointment after scs trial and or if he returns sooner  Informed patient I will call him w/ result and forward to his PCP -whom is out of network at [facility]  · AVS teaching handouts           Subjective: RTO visit after initial neurosurgery evaluation 11/16/2022 with Dr Alejandrina Casey, seen for evaluation for spinal cord stimulator (SCS) trial for reassessment and imagining review  Patient ID: Johnny Ruiz is a 74 y o  male     HPI   He has a longstanding history of chronic pain in his bilateral lumbosacral pain with distribution int o both buttocks  to mid buttocks  He has failed conservative multimodal management consiisting of physical therapy and PM intervention treatments injections and medicinal   Reports he treated with gabapentin in the past was not efficacious, Lyrcia -could not tolerate adverse effects  He describes neuropathy symptoms in bilateral feet burning , numbness , tingling, sensation like clumps of wood in fee  Reports HO diabetic left foot ulcer (DFU), as recent as October 2022  Treated with surgical debridement  debridements of callus and eschar  Received recent home care visit in October 2022  He reports ulcer healed  Assessment and imagining review during 11/16/22 visit is as follows   · Reported symptoms consistent with bilateral L1 radiculopathy  · Physical examination  showed weak bilateral  hip flexors (4/5), difficult to ascertain oif pain was a contributing factor  No Hip imagining  · Plan flim of lumbar spine  Demonstrates  Lateral listhesis though difficult to determine if secondary to pain    · A thoracic spine imagining was ordered in preparation for SCS trail which showed   · hemangioma within the central aspect of the T10 vertebral body  At the L1-L2 level there is diffusely heterogeneous marrow signal with endplate erosion and moderate edema  Multilevel thoracic degenerative changes  , no surgically significant stenosis, lower thoracic disc calcification  · Lumbar spine L1-L2 disc changes that warrant further assessment of the lumbar spine  Neurosurgery/Dr Moraes's  recommendation was He can proceed with spinal cord stimulator trial, though lumbar decompression and fusion may benefit some of his symptoms as well  To complete a comprehensive assessment pre approval for SCS trial imagining was ordered MRI lumbar and Xray lumbar 6 Views ,flexion-extension and RTO for imagining review and reassessment  He returns today for f/u visit       I have spent 35 minutes with patient today in which greater than 50% of this time was spent in, assessment, counseling/coordination of care regarding proposed surgical procedure options and impressions, all questions were answered to patient’s satisfaction, who verbalized and understanding and was provided with contact information if additional questions arise           REVIEW OF SYSTEMS  Review of Systems   Constitutional: Positive for appetite change (hasnt been eating , doesnt feel hungry )  HENT: Negative for tinnitus  Eyes: Negative for visual disturbance  Respiratory: Negative for shortness of breath and wheezing  Cardiovascular: Negative for chest pain  Gastrointestinal: Negative  Genitourinary: Negative  Some trouble emptying/finishing, feels pressure in back and waist   Musculoskeletal: Positive for back pain (constant low back pain, radiates up spine and wraps around to front  Occasionally radiates to buttocks and sometimes posterior legs), gait problem (using rolling walker, fell this morning), myalgias (muscle pain/tightness/spams in back ) and neck pain (sometimes feels a little pain )          Wearing brace   Neurological: Positive for numbness (sometimes in his feet from neuropathy  and sometimes in his arms )  Negative for dizziness, tremors, seizures, weakness and headaches  All other systems reviewed and are negative  Meds/Allergies     Current Outpatient Medications   Medication Sig Dispense Refill   • ACCU-CHEK FABIANO PLUS test strip 4 (four) times a day  1   • ACCU-CHEK FASTCLIX LANCETS MISC 4 (four) times a day  1   • Acetaminophen (TYLENOL EXTRA STRENGTH PO) Take 3 tablets by mouth every 6 (six) hours     • celecoxib (CeleBREX) 200 mg capsule Take 1 PO BID PRN for pain with food and NO other NSAIDS   60 capsule 1   • cetirizine (ZyrTEC) 10 mg tablet Take 10 mg by mouth daily  2   • CHOLECALCIFEROL PO Take 5,000 Units by mouth daily     • clonazePAM (KlonoPIN) 1 mg tablet Take 1 mg by mouth 2 (two) times a day     • cyclobenzaprine (FLEXERIL) 10 mg tablet Take 10 mg by mouth daily at bedtime as needed     • folic acid (FOLVITE) 1 mg tablet Take 2,000 mcg by mouth daily  6   • GLOBAL INJECT EASE INSULIN SYR 31G X 5/16" 1 ML MISC 2 (two) times a day  0   • GNP ASPIRIN LOW DOSE 81 MG EC tablet Take 81 mg by mouth daily  0   • hydrOXYzine pamoate (VISTARIL) 25 mg capsule Take 50 mg by mouth daily at bedtime     • ibuprofen (MOTRIN) 400 mg tablet Take 800 mg by mouth 3 (three) times a day not taking  1   • Jardiance 10 MG TABS Take 10 mg by mouth daily     • ketoconazole (NIZORAL) 2 % shampoo daily Use as directed  6   • Lantus SoloStar 100 units/mL SOPN Inject 37 mg under the skin 2 (two) times a day with meals     • losartan-hydrochlorothiazide (HYZAAR) 100-25 MG per tablet Take 1 tablet by mouth daily  0   • lovastatin (MEVACOR) 20 mg tablet Take 20 mg by mouth daily  3   • metFORMIN (GLUCOPHAGE) 1000 MG tablet Take 1,000 mg by mouth 2 (two) times a day with meals      • metoprolol succinate (TOPROL-XL) 50 mg 24 hr tablet Take 75 mg by mouth daily     • mirtazapine (REMERON) 45 MG tablet Take 45 mg by mouth daily at bedtime  1   • NIFEdipine (PROCARDIA XL) 30 mg 24 hr tablet TAKE 1 TABLET BY MOUTH DAILY in addition TO 90mg FOR A total OF 120mg DAILY     • NIFEdipine (PROCARDIA XL) 90 mg 24 hr tablet Take 90 mg by mouth daily  3   • sertraline (ZOLOFT) 25 mg tablet Take 100 mg by mouth daily     • docusate sodium (COLACE) 100 mg capsule Take 1 capsule (100 mg total) by mouth 2 (two) times a day 14 capsule 0   • DULoxetine (CYMBALTA) 60 mg delayed release capsule Take 60 mg by mouth 2 (two) times a day not taking     • LORazepam (ATIVAN) 1 mg tablet Take 1 mg by mouth 2 (two) times a day as needed (Patient not taking: Reported on 11/16/2022)     • patient supplied medication [employer] leg and back pain relief po, 2 tabs, 4x daily (Patient not taking: Reported on 12/7/2022)     • polyethylene glycol (MIRALAX) 17 g packet Take 17 g by mouth daily as needed (constipation) (Patient not taking: Reported on 11/16/2022) 10 each 0   • senna (SENOKOT) 8 6 mg Take 2 tablets (17 2 mg total) by mouth 2 (two) times a day 28 tablet 0   • tiZANidine (ZANAFLEX) 2 mg tablet Take 1 PO QID PRN for pain and spasms  (Patient not taking: Reported on 12/7/2022) 60 tablet 1     No current facility-administered medications for this visit         Allergies   Allergen Reactions   • Cephalexin Rash   • Abilify [Aripiprazole] Other (See Comments)     Shaking     • Molds & Smuts        PAST HISTORY    Past Medical History:   Diagnosis Date   • Anxiety    • Arthritis    • Cancer ([facility] [address] )    • Depression    • Diabetes mellitus ([facility] [address] )    • Hypertension    • Liver disease    • Mitral valve prolapse    • Thyroid disease        Past Surgical History:   Procedure Laterality Date   • INCISION AND DRAINAGE OF WOUND Left 8/12/2022    Procedure: INCISION AND DRAINAGE (I&D) EXTREMITY;  Surgeon: Phil Calloway DPM;  Location:  MAIN OR;  Service: Podiatry   • JOINT REPLACEMENT Left 03/11/2022    Left TSA   • THYROID SURGERY  2021    remove cancer       Social History     Tobacco Use   • Smoking status: Former     Packs/day: 0 25     Types: Cigarettes     Quit date:      Years since quittin 9   • Smokeless tobacco: Never   • Tobacco comments:     quit    Vaping Use   • Vaping Use: Never used   Substance Use Topics   • Alcohol use: Yes     Comment: occasional, social   • Drug use: Not Currently     Types: Marijuana       History reviewed  No pertinent family history  The following portions of the patient's history were reviewed and updated as appropriate: allergies, current medications, past family history, past medical history, past social history, past surgical history and problem list       EXAM    Vitals:Blood pressure 146/78, pulse 89, temperature (!) 97 °F (36 1 °C), temperature source Temporal, resp  rate 16, height 5' 10" (1 778 m), weight 88 kg (194 lb)  ,Body mass index is 27 84 kg/m²  Physical Exam  Vitals and nursing note reviewed  Exam conducted with a chaperone present (significant other)  Constitutional:       General: He is not in acute distress  Appearance: He is normal weight  He is not ill-appearing, toxic-appearing or diaphoretic  HENT:      Head: Normocephalic and atraumatic  Eyes:      General: No scleral icterus  Right eye: No discharge  Left eye: No discharge  Pulmonary:      Effort: Pulmonary effort is normal  No respiratory distress  Musculoskeletal:      Left lower leg: No edema  Skin:     General: Skin is warm and dry  Neurological:      Mental Status: He is alert and oriented to person, place, and time  Sensory: Sensory deficit present  Motor: Weakness (bilateral hips 4/5) present  Coordination: Coordination normal       Gait: Gait is intact  Gait normal    Psychiatric:         Mood and Affect: Mood normal          Behavior: Behavior normal          Neurologic Exam     Mental Status   Oriented to person, place, and time  Level of consciousness: alert    Motor Exam     Strength   Strength 5/5 except as noted  Right iliopsoas: 4/5  Left iliopsoas: 4/5    Sensory Exam   Right leg light touch: decreased from ankle  Left leg light touch: decreased from ankle    Gait, Coordination, and Reflexes     Gait  Gait: normal      Imaging Studies  XR spine lumbar minimum 4 views non injury--Result Date: 12/6/2022  Narrative: LUMBAR SPINE INDICATION:   M47 [id number]: Spondylosis without myelopathy or radiculopathy, lumbar region  COMPARISON:  8/26/2022  CT 8/14/2022  VIEWS:  XR SPINE LUMBAR MINIMUM 4 VIEWS NON INJURY FINDINGS: There are 5 non rib bearing lumbar vertebral bodies  There is no evidence of acute fracture or destructive osseous lesion  L1-L2, L2-L3 slight retrolisthesis  L3-L4 slight spondylolisthesis  Anatomic alignment otherwise  Lumbar lordosis straightening  Minimal levoscoliosis suggested  No significant motion abnormality flexion/extension  L1-L2 marked, L2-L3, L3-L4 moderate, L4-L5 L5-S1 marked disc space narrowing  L1-L2 through L5-S1 prominent osteophytes  The pedicles appear intact  Soft tissues are unremarkable  Impression: Lumbar lordosis straightening  Marked degenerative lumbar spine No acute osseous abnormality nor motion stability Workstation performed: [id number]     MRI lumbar spine wo contrast    Result Date: 12/6/2022  Narrative: MRI LUMBAR SPINE WITHOUT CONTRAST INDICATION: [id number]: Spondylosis without myelopathy or radiculopathy, lumbar region  Low back pain  Difficulty ambulating  COMPARISON:  None  TECHNIQUE:  Multiplanar, multisequence imaging of the lumbar spine was performed     IMAGE QUALITY:  Diagnostic   FINDINGS: VERTEBRAL BODIES:  There are 5 lumbar type vertebral bodies  Marrow edema about the L1-2 intervertebral disc space noted  Additionally, there is marrow edema in the superior endplate of L3 where there is a Schmorl's node  No definite high T2 signal in  the L1-2 intervertebral disc space  Multilevel Schmorl's nodes noted elsewhere    Small hemangioma versus type II Modic endplate changes inferior endplate of V01  Mild levoscoliosis mid lumbar spine centered at the L3 level  SACRUM:  Normal signal within the sacrum  No evidence of insufficiency or stress fracture  DISTAL CORD AND CONUS:    Undulating redundancy of the roots of the cauda equina at the L1-2 level, likely on the basis of spinal stenosis  The conus medullaris terminates at the mid L1 level  PARASPINAL SOFT TISSUES:  Paraspinal soft tissues are unremarkable  LOWER THORACIC DISC SPACES:  Normal disc height and signal   No disc herniation, canal stenosis or foraminal narrowing  LUMBAR DISC SPACES: L1-L2:  There is a central/left paracentral disc herniation, protrusion type  There is bilateral facet hypertrophy and infolding ligamentum flavum  Moderate to severe central canal narrowing  Severe left neural foraminal narrowing  Moderate right neural foraminal narrowing  L2-L3:  There is bilateral facet hypertrophy  There is infolding ligamentum flavum  There is a right neural foraminal disc protrusion  Mild to moderate right neural foraminal narrowing  Mild central canal narrowing  Moderate left neural foraminal narrowing  L3-L4:  There is infolding ligamentum flavum  There is a right neural foraminal disc protrusion  Severe right neural foraminal narrowing  Moderate central canal narrowing  Moderate left neural foraminal narrowing  L4-L5:  There is bilateral facet hypertrophy  There is loss of disc height and signal   There is a left neural foraminal disc protrusion  Severe central canal left neural foraminal narrowing  Moderate right neural foraminal narrowing  L5-S1:  There is loss of disc height and signal   There is a right neural foraminal disc protrusion  Severe right neural foraminal narrowing  Mild central canal narrowing  Moderate to severe left neural foraminal narrowing  OTHER FINDINGS:  None  Impression: 1    Multifocal marrow edema L1, L2 and superior endplate of L3 with a Schmorl's node indicative of type I Modic endplate changes with acute Schmorl's node  Discitis/osteomyelitis at L1-2 is thought to be less likely given the lack of T2 hyperintensity within the intervertebral disc space  Does the patient have osteoporotic risk factors? Further clinical assessment recommended  2   Advanced multilevel spondylosis as described with multilevel disc herniations  Workstation performed: [id number]       I have personally reviewed pertinent reports     and I have personally reviewed pertinent films in PACS

## 2022-12-08 PROBLEM — G89.4 CHRONIC PAIN SYNDROME: Status: ACTIVE | Noted: 2022-12-08

## 2022-12-08 NOTE — ASSESSMENT & PLAN NOTE
· As addressed in HPI   · Reports symptoms and history consistent with chronic pain syndrome     Patient is more interested in undergoing a SCS trial than pursuing with decompression /fusion surgery at this time  · Explained he will return to  neurosurgery either way if SCS is efficacious or not for permanent implant or for further discussion with Dr Xander Dutton for decompression fusion  · Is wearing and LSO brace, reports ordered by his PCP   · Surgery barriers -DM2 uncontrolled recent HgA1C  )ctober 20 2022 7 7, 8/10/22 6 5  · Continued with bilateral hip pain during ROM and limitation in ROM secondary to pain  Imagining   · 12/6/22 MRI Lumbar spine Multifocal marrow edema L1, L2 and superior endplate of L3 with a Schmorl's node indicative of type I Modic endplate changes with acute Schmorl's node  Discitis/osteomyelitis at L1-2 is thought to be less likely given the lack of T2 hyperintensity within the intervertebral disc space  Does the patient have osteoporotic risk factors? Further clinical assessment recommended  2   Advanced multilevel spondylosis as described with multilevel disc herniations  · Imagining impression questions does patient have osteoporotic risk factors  Patient He reports a history of thyroid cancer and surgery in 2020 Right thyroid, subtotal thyroidectomy, he also has a history of alcoholic cirrhosis  In imagining review l1 appears to have a wedge shaped abnormality  SO reports patient has recurrent falls  L1 vertebral body with a wedge appearance / marrow edema  · 12/6/22 Xay Lumbar spine fex/ext/oblique- Lumbar lordosis straightening  Marked degenerative lumbar spine  slight retrolisthesis  No acute osseous abnormality no motion abnormality flexion/extension  Plan   · Reviewed imagining in detail with patient and s/o    · Advised t contact PM, Dr Ernesto Astudillo request appointment for [facility] trial/further discussion  · If worsening symptoms of pain contact neurosurgery for sooner f/u   · Reviewed red flag symptoms if develop contact the office   · If additional questions or concerns call neurosurgery  · Ordered DXA scan, will review result at [facility] appointment after scs trial and or if he returns sooner  Informed patient I will call him w/ result and forward to his PCP -whom is out of network at [facility]     · AVS teaching handouts

## 2022-12-22 ENCOUNTER — TELEPHONE (OUTPATIENT)
Dept: PAIN MEDICINE | Facility: CLINIC | Age: 67
End: 2022-12-22

## 2022-12-22 DIAGNOSIS — M47.816 LUMBAR SPONDYLOSIS: ICD-10-CM

## 2022-12-22 DIAGNOSIS — M51.27 HERNIATED NUCLEUS PULPOSUS OF LUMBOSACRAL REGION: ICD-10-CM

## 2022-12-22 NOTE — TELEPHONE ENCOUNTER
Caller: Mickael Kocher    Doctor:     Reason for call: Please reach back out to patient   Wife said if she doesn't answer to please leave a message on her phone      Call back#: [phone]

## 2022-12-22 NOTE — TELEPHONE ENCOUNTER
Attempted to contact pt  Left a detailed message per Tatyana's request  Advised that the writer is returning the pt's call re: increased pain  Please cb to discuss further  Provided cb number and office hours

## 2022-12-22 NOTE — TELEPHONE ENCOUNTER
Caller: Wood Corbett (pt's wife)    Doctor: Carolina Alba    Reason for call: she would like to know if pt's med insurance has approved Stimulator Trial procedure & pt stopped Celebrex, Zanflex due to those meds not helping his pain level 10/10  She is requesting an alternative pain medication that may help his status       Call back#: [phone]

## 2022-12-23 RX ORDER — CELECOXIB 200 MG/1
CAPSULE ORAL
Qty: 60 CAPSULE | Refills: 2 | Status: SHIPPED | OUTPATIENT
Start: 2022-12-23

## 2022-12-23 NOTE — TELEPHONE ENCOUNTER
S/w pt's wife, Amelia Mata  Per Amelia Mata, pt felt that he was not getting relief with celebrex or tizanidine and therefore stopped these medications ~ 1 month ago  Per Amelia Mata, pt started advil and tylenol and feels that his pain is worse without the tizanidine and celebrex  Confirmed the pt is currently taking flexeril per his pcp w/ the same amt of relief as tizanidine  Questioned if the pt would like to resume celebrex  Amelia Mata verbalized agreement and requested that the rx be sent to professional pharmacy  Justice Regan, the writer will d/w DG  Anticipate this rx will be sent to the pharmacy  The writer will cb if there is any question or change in the plan  Amelia Mata is aware, the pt is to stop any other nsaids with this medication

## 2022-12-23 NOTE — TELEPHONE ENCOUNTER
Caller: Vani Ocampo     Doctor: Juan Luis Veliz     Reason for call:    Patient returning nurses call-transferred to clinical

## 2022-12-23 NOTE — TELEPHONE ENCOUNTER
S/w pt's wife, Nii Kennedy  Advised of above  Tivis Nearing verbalized understanding and appreciation  Stated that she will fu to make an ov w/ DG

## 2022-12-23 NOTE — TELEPHONE ENCOUNTER
Caller: Amelia Mata ( pt Wife )  Doctor: Izzy Young    Reason for call: calling nurse back about his pain     Call back#: [phone]

## 2022-12-23 NOTE — TELEPHONE ENCOUNTER
I sent a 30 day script with 2 refills for the Celebrex to the pharmacy as requested  He should make sure he takes it with food and should not take any other NSAIDs while taking the Celebrex  He will need to scheduled a f/u OV in 12 weeks with our office before he runs out of medication before we can send any more refills in the future OR his PCP can take this medication over and f/u PRN

## 2023-01-07 ENCOUNTER — HOSPITAL ENCOUNTER (EMERGENCY)
Facility: HOSPITAL | Age: 68
Discharge: HOME | End: 2023-01-07
Attending: EMERGENCY MEDICINE

## 2023-01-07 VITALS
SYSTOLIC BLOOD PRESSURE: 121 MMHG | HEART RATE: 91 BPM | HEIGHT: 70 IN | BODY MASS INDEX: 27.77 KG/M2 | RESPIRATION RATE: 16 BRPM | DIASTOLIC BLOOD PRESSURE: 77 MMHG | OXYGEN SATURATION: 98 % | TEMPERATURE: 98.1 F | WEIGHT: 194 LBS

## 2023-01-07 DIAGNOSIS — M54.50 ACUTE EXACERBATION OF CHRONIC LOW BACK PAIN: Primary | ICD-10-CM

## 2023-01-07 DIAGNOSIS — G89.29 ACUTE EXACERBATION OF CHRONIC LOW BACK PAIN: Primary | ICD-10-CM

## 2023-01-07 RX ORDER — METHYLPREDNISOLONE ACETATE 80 MG/ML
120 INJECTION, SUSPENSION INTRA-ARTICULAR; INTRALESIONAL; INTRAMUSCULAR; SOFT TISSUE ONCE
Status: COMPLETED | OUTPATIENT
Start: 2023-01-07 | End: 2023-01-07

## 2023-01-07 RX ORDER — KETOROLAC TROMETHAMINE 30 MG/ML
30 INJECTION, SOLUTION INTRAMUSCULAR; INTRAVENOUS ONCE
Status: COMPLETED | OUTPATIENT
Start: 2023-01-07 | End: 2023-01-07

## 2023-01-07 RX ORDER — METHYLPREDNISOLONE 4 MG/1
4 TABLET ORAL SEE ADMIN INSTRUCTIONS
Qty: 21 TABLET | Refills: 0 | Status: SHIPPED | OUTPATIENT
Start: 2023-01-07 | End: 2023-01-26 | Stop reason: ALTCHOICE

## 2023-01-07 RX ADMIN — KETOROLAC TROMETHAMINE 30 MG: 30 INJECTION, SOLUTION INTRAMUSCULAR; INTRAVENOUS at 21:30

## 2023-01-07 RX ADMIN — METHYLPREDNISOLONE ACETATE 120 MG: 80 INJECTION, SUSPENSION INTRA-ARTICULAR; INTRALESIONAL; INTRAMUSCULAR; SOFT TISSUE at 21:32

## 2023-01-08 NOTE — ED PROVIDER NOTES
History  Chief Complaint   Patient presents with   • Back Pain     Pt to ED with Severe back problems L2 area and needs plate placed  Surgery for temporary stim scheduled  but pain too bad today and needed to come to ER  Denies incontinence  3 tylenol at home and torodol given at 1340 with no relief  Pt states he has relief with steroid shot and taper     Chronic back pain worse today wears a brace  No specific trigger  Feels sharp and radiates around to the front groin area bilaterally sometimes down into the upper legs  No new numbness or weakness but does have chronic neuropathy  No new loss of bladder or bowel control  No saddle anesthesia  Constant pain  Previously Toradol and steroids have helped  He has been taking Tylenol and Flexeril without relief at home  Prior to Admission Medications   Prescriptions Last Dose Informant Patient Reported? Taking?    ACCU-CHEK FABIANO PLUS test strip   Yes No   Si (four) times a day   ACCU-CHEK FASTCLIX LANCETS MISC   Yes No   Si (four) times a day   Acetaminophen (TYLENOL EXTRA STRENGTH PO)   Yes No   Sig: Take 3 tablets by mouth every 6 (six) hours   CHOLECALCIFEROL PO   Yes No   Sig: Take 5,000 Units by mouth daily   DULoxetine (CYMBALTA) 60 mg delayed release capsule   Yes No   Sig: Take 60 mg by mouth 2 (two) times a day not taking   GLOBAL INJECT EASE INSULIN SYR 31G X 5/16" 1 ML MISC   Yes No   Si (two) times a day   GNP ASPIRIN LOW DOSE 81 MG EC tablet   Yes No   Sig: Take 81 mg by mouth daily   Jardiance 10 MG TABS   Yes No   Sig: Take 10 mg by mouth daily   LORazepam (ATIVAN) 1 mg tablet   Yes No   Sig: Take 1 mg by mouth 2 (two) times a day as needed   Patient not taking: Reported on 2022   Lantus SoloStar 100 units/mL SOPN   Yes No   Sig: Inject 37 mg under the skin 2 (two) times a day with meals   NIFEdipine (PROCARDIA XL) 30 mg 24 hr tablet   Yes No   Sig: TAKE 1 TABLET BY MOUTH DAILY in addition TO 90mg FOR A total OF 120mg DAILY   NIFEdipine (PROCARDIA XL) 90 mg 24 hr tablet   Yes No   Sig: Take 90 mg by mouth daily   celecoxib (CeleBREX) 200 mg capsule   No No   Sig: Take 1 PO BID PRN for pain with food and NO other NSAIDS     cetirizine (ZyrTEC) 10 mg tablet   Yes No   Sig: Take 10 mg by mouth daily   clonazePAM (KlonoPIN) 1 mg tablet   Yes No   Sig: Take 1 mg by mouth 2 (two) times a day   cyclobenzaprine (FLEXERIL) 10 mg tablet   Yes No   Sig: Take 10 mg by mouth daily at bedtime as needed   docusate sodium (COLACE) 100 mg capsule   No No   Sig: Take 1 capsule (100 mg total) by mouth 2 (two) times a day   folic acid (FOLVITE) 1 mg tablet   Yes No   Sig: Take 2,000 mcg by mouth daily   hydrOXYzine pamoate (VISTARIL) 25 mg capsule   Yes No   Sig: Take 50 mg by mouth daily at bedtime   ibuprofen (MOTRIN) 400 mg tablet   Yes No   Sig: Take 800 mg by mouth 3 (three) times a day not taking   ketoconazole (NIZORAL) 2 % shampoo   Yes No   Sig: daily Use as directed   losartan-hydrochlorothiazide (HYZAAR) 100-25 MG per tablet   Yes No   Sig: Take 1 tablet by mouth daily   lovastatin (MEVACOR) 20 mg tablet   Yes No   Sig: Take 20 mg by mouth daily   metFORMIN (GLUCOPHAGE) 1000 MG tablet   Yes No   Sig: Take 1,000 mg by mouth 2 (two) times a day with meals    metoprolol succinate (TOPROL-XL) 50 mg 24 hr tablet   Yes No   Sig: Take 75 mg by mouth daily   mirtazapine (REMERON) 45 MG tablet   Yes No   Sig: Take 45 mg by mouth daily at bedtime   patient supplied medication   Yes No   Sig: [employer] leg and back pain relief po, 2 tabs, 4x daily   Patient not taking: Reported on 12/7/2022   senna (SENOKOT) 8 6 mg   No No   Sig: Take 2 tablets (17 2 mg total) by mouth 2 (two) times a day   sertraline (ZOLOFT) 25 mg tablet   Yes No   Sig: Take 100 mg by mouth daily      Facility-Administered Medications: None       Past Medical History:   Diagnosis Date   • Anxiety    • Arthritis    • Cancer ([place] [id number] )    • Depression    • Diabetes mellitus ([place] [address] ) • Hypertension    • Liver disease    • Mitral valve prolapse    • Thyroid disease        Past Surgical History:   Procedure Laterality Date   • INCISION AND DRAINAGE OF WOUND Left 2022    Procedure: INCISION AND DRAINAGE (I&D) EXTREMITY;  Surgeon: Clement Kincaid DPM;  Location:  MAIN OR;  Service: Podiatry   • JOINT REPLACEMENT Left 2022    Left TSA   • THYROID SURGERY      remove cancer       History reviewed  No pertinent family history  I have reviewed and agree with the history as documented  E-Cigarette/Vaping   • E-Cigarette Use Never User      E-Cigarette/Vaping Substances     Social History     Tobacco Use   • Smoking status: Former     Packs/day: 0 25     Types: Cigarettes     Quit date:      Years since quittin 0   • Smokeless tobacco: Never   • Tobacco comments:     quit    Vaping Use   • Vaping Use: Never used   Substance Use Topics   • Alcohol use: Yes     Comment: occasional, social   • Drug use: Not Currently     Types: Marijuana       Review of Systems   Constitutional: Negative for chills and fever  HENT: Negative for rhinorrhea and sore throat  Respiratory: Negative for shortness of breath  Cardiovascular: Negative for chest pain  Gastrointestinal: Negative for constipation, diarrhea, nausea and vomiting  Genitourinary: Negative for dysuria and frequency  Skin: Negative for rash  All other systems reviewed and are negative  Physical Exam  Physical Exam  Vitals and nursing note reviewed  Constitutional:       Appearance: He is well-developed  HENT:      Head: Normocephalic and atraumatic  Right Ear: External ear normal       Left Ear: External ear normal       Nose: Nose normal    Eyes:      Conjunctiva/sclera: Conjunctivae normal       Pupils: Pupils are equal, round, and reactive to light  Cardiovascular:      Rate and Rhythm: Normal rate and regular rhythm  Heart sounds: Normal heart sounds     Pulmonary:      Effort: Pulmonary effort is normal  No respiratory distress  Breath sounds: Normal breath sounds  No wheezing  Abdominal:      General: Bowel sounds are normal  There is no distension  Palpations: Abdomen is soft  Tenderness: There is no abdominal tenderness  Musculoskeletal:         General: No deformity  Normal range of motion  Cervical back: Normal range of motion and neck supple  No bony tenderness  No spinous process tenderness  Thoracic back: No bony tenderness  Lumbar back: Spasms and tenderness present  Negative right straight leg raise test and negative left straight leg raise test         Back:    Skin:     General: Skin is warm and dry  Findings: No rash  Neurological:      General: No focal deficit present  Mental Status: He is alert  GCS: GCS eye subscore is 4  GCS verbal subscore is 5  GCS motor subscore is 6  Sensory: Sensation is intact  Motor: Motor function is intact     Psychiatric:         Mood and Affect: Mood normal          Vital Signs  ED Triage Vitals   Temperature Pulse Respirations Blood Pressure SpO2   01/07/23 1615 01/07/23 1615 01/07/23 1615 01/07/23 1615 01/07/23 1615   97 9 °F (36 6 °C) 95 18 117/70 98 %      Temp Source Heart Rate Source Patient Position - Orthostatic VS BP Location FiO2 (%)   01/07/23 1615 01/07/23 1615 01/07/23 1615 01/07/23 1615 --   Temporal Monitor Lying Left arm       Pain Score       01/07/23 1616       10 - Worst Possible Pain           Vitals:    01/07/23 1615 01/07/23 2129   BP: 117/70 121/77   Pulse: 95 91   Patient Position - Orthostatic VS: Lying          Visual Acuity      ED Medications  Medications   ketorolac (TORADOL) injection 30 mg (30 mg Intramuscular Given 1/7/23 2130)   methylPREDNISolone acetate (DEPO-MEDROL) injection 120 mg (120 mg Intramuscular Given 1/7/23 2132)       Diagnostic Studies  Results Reviewed     None                 No orders to display              Procedures  Procedures ED Course                               SBIRT 20yo+    Flowsheet Row Most Recent Value   SBIRT (25 yo +)    In order to provide better care to our patients, we are screening all of our patients for alcohol and drug use  Would it be okay to ask you these screening questions? No Filed at: 01/07/2023 2136   Initial Alcohol Screen: US AUDIT-C     1  How often do you have a drink containing alcohol? 0 Filed at: 01/07/2023 2136   2  How many drinks containing alcohol do you have on a typical day you are drinking? 0 Filed at: 01/07/2023 2136   3a  Male UNDER 65: How often do you have five or more drinks on one occasion? 0 Filed at: 01/07/2023 2136   3b  FEMALE Any Age, or MALE 65+: How often do you have 4 or more drinks on one occassion? 0 Filed at: 01/07/2023 2136   Audit-C Score 0 Filed at: 01/07/2023 2136   JARON: How many times in the past year have you    Used an illegal drug or used a prescription medication for non-medical reasons? Never Filed at: 01/07/2023 2136                    Medical Decision Making  Differential is cauda equina, acute cord compression - less likely without any saddle anesthesia or loss of bowel or bladder control  Considered ct/ mri spine but No imaging neccessary without new trauma or sign of infection/ nerve deficit  Acute exacerbation of chronic low back pain: acute illness or injury  Risk  Prescription drug management  Disposition  Final diagnoses:   Acute exacerbation of chronic low back pain     Time reflects when diagnosis was documented in both MDM as applicable and the Disposition within this note     Time User Action Codes Description Comment    1/7/2023  9:07 PM Sara Flynn Add [M54 50,  G89 29] Acute exacerbation of chronic low back pain       ED Disposition     ED Disposition   Discharge    Condition   Stable    Date/Time   Sat Jan 7, 2023  9:07 PM    Comment   Osvaldo Funk discharge to home/self care                 Follow-up Information    None Discharge Medication List as of 1/7/2023  9:09 PM      START taking these medications    Details   methylprednisolone (MEDROL) 4 mg tablet Take 1 tablet (4 mg total) by mouth see administration instructions Medrol dose pack take as directed, Starting Sat 1/7/2023, Normal         CONTINUE these medications which have NOT CHANGED    Details   ACCU-CHEK FABIANO PLUS test strip 4 (four) times a day, Starting Mon 12/17/2018, Historical Med      ACCU-CHEK FASTCLIX LANCETS MISC 4 (four) times a day, Starting Sat 12/22/2018, Historical Med      Acetaminophen (TYLENOL EXTRA STRENGTH PO) Take 3 tablets by mouth every 6 (six) hours, Historical Med      celecoxib (CeleBREX) 200 mg capsule Take 1 PO BID PRN for pain with food and NO other NSAIDS , Normal      cetirizine (ZyrTEC) 10 mg tablet Take 10 mg by mouth daily, Starting Mon 12/17/2018, Historical Med      CHOLECALCIFEROL PO Take 5,000 Units by mouth daily, Historical Med      clonazePAM (KlonoPIN) 1 mg tablet Take 1 mg by mouth 2 (two) times a day, Starting Mon 10/24/2022, Historical Med      cyclobenzaprine (FLEXERIL) 10 mg tablet Take 10 mg by mouth daily at bedtime as needed, Starting Tue 11/29/2022, Historical Med      docusate sodium (COLACE) 100 mg capsule Take 1 capsule (100 mg total) by mouth 2 (two) times a day, Starting Mon 8/15/2022, Normal      DULoxetine (CYMBALTA) 60 mg delayed release capsule Take 60 mg by mouth 2 (two) times a day not taking, Starting Tue 6/7/2022, Historical Med      folic acid (FOLVITE) 1 mg tablet Take 2,000 mcg by mouth daily, Starting Mon 12/17/2018, Historical Med      GLOBAL INJECT EASE INSULIN SYR 31G X 5/16" 1 ML MISC 2 (two) times a day, Starting Sat 12/22/2018, Historical Med      GNP ASPIRIN LOW DOSE 81 MG EC tablet Take 81 mg by mouth daily, Starting Mon 12/17/2018, Historical Med      hydrOXYzine pamoate (VISTARIL) 25 mg capsule Take 50 mg by mouth daily at bedtime, Starting Sat 7/2/2022, Historical Med      ibuprofen (MOTRIN) 400 mg tablet Take 800 mg by mouth 3 (three) times a day not taking, Starting Wed 11/21/2018, Historical Med      Jardiance 10 MG TABS Take 10 mg by mouth daily, Starting Wed 7/20/2022, Historical Med      ketoconazole (NIZORAL) 2 % shampoo daily Use as directed, Starting Wed 10/10/2018, Historical Med      Lantus SoloStar 100 units/mL SOPN Inject 37 mg under the skin 2 (two) times a day with meals, Starting Sun 7/10/2022, Historical Med      LORazepam (ATIVAN) 1 mg tablet Take 1 mg by mouth 2 (two) times a day as needed, Historical Med      losartan-hydrochlorothiazide (HYZAAR) 100-25 MG per tablet Take 1 tablet by mouth daily, Starting Mon 12/17/2018, Historical Med      lovastatin (MEVACOR) 20 mg tablet Take 20 mg by mouth daily, Starting Mon 12/17/2018, Historical Med      metFORMIN (GLUCOPHAGE) 1000 MG tablet Take 1,000 mg by mouth 2 (two) times a day with meals , Starting Wed 1/2/2019, Historical Med      metoprolol succinate (TOPROL-XL) 50 mg 24 hr tablet Take 75 mg by mouth daily, Historical Med      mirtazapine (REMERON) 45 MG tablet Take 45 mg by mouth daily at bedtime, Starting Tue 12/11/2018, Historical Med      NIFEdipine (PROCARDIA XL) 30 mg 24 hr tablet TAKE 1 TABLET BY MOUTH DAILY in addition TO 90mg FOR A total OF 120mg DAILY, Historical Med      NIFEdipine (PROCARDIA XL) 90 mg 24 hr tablet Take 90 mg by mouth daily, Starting Mon 12/17/2018, Historical Med      patient supplied medication [employer] leg and back pain relief po, 2 tabs, 4x daily, Historical Med      senna (SENOKOT) 8 6 mg Take 2 tablets (17 2 mg total) by mouth 2 (two) times a day, Starting Mon 8/15/2022, Normal      sertraline (ZOLOFT) 25 mg tablet Take 100 mg by mouth daily, Historical Med             No discharge procedures on file      PDMP Review       Value Time User    PDMP Reviewed  Yes 8/15/2022  3:08 PM Alanis Moseley PA-C          ED Provider  Electronically Signed by           Aldo Jacobs DO  01/07/23 8955

## 2023-01-09 ENCOUNTER — TRANSCRIBE ORDERS (OUTPATIENT)
Dept: PAIN MEDICINE | Facility: CLINIC | Age: 68
End: 2023-01-09

## 2023-01-16 ENCOUNTER — TRANSCRIBE ORDERS (OUTPATIENT)
Dept: PAIN MEDICINE | Facility: CLINIC | Age: 68
End: 2023-01-16

## 2023-01-16 DIAGNOSIS — G89.29 CHRONIC LOW BACK PAIN, UNSPECIFIED BACK PAIN LATERALITY, UNSPECIFIED WHETHER SCIATICA PRESENT: ICD-10-CM

## 2023-01-16 DIAGNOSIS — M48.061 SPINAL STENOSIS OF LUMBAR REGION, UNSPECIFIED WHETHER NEUROGENIC CLAUDICATION PRESENT: ICD-10-CM

## 2023-01-16 DIAGNOSIS — M54.50 CHRONIC LOW BACK PAIN, UNSPECIFIED BACK PAIN LATERALITY, UNSPECIFIED WHETHER SCIATICA PRESENT: ICD-10-CM

## 2023-01-16 DIAGNOSIS — G89.4 CHRONIC PAIN SYNDROME: Primary | ICD-10-CM

## 2023-01-16 DIAGNOSIS — Z79.01 CHRONIC ANTICOAGULATION: ICD-10-CM

## 2023-01-16 DIAGNOSIS — M47.816 LUMBAR SPONDYLOSIS: ICD-10-CM

## 2023-01-16 DIAGNOSIS — Z79.899 ENCOUNTER FOR LONG-TERM (CURRENT) USE OF OTHER MEDICATIONS: ICD-10-CM

## 2023-01-17 ENCOUNTER — OFFICE VISIT (OUTPATIENT)
Dept: PAIN MEDICINE | Facility: CLINIC | Age: 68
End: 2023-01-17

## 2023-01-17 VITALS
SYSTOLIC BLOOD PRESSURE: 148 MMHG | BODY MASS INDEX: 27.35 KG/M2 | HEART RATE: 105 BPM | WEIGHT: 191 LBS | DIASTOLIC BLOOD PRESSURE: 82 MMHG | TEMPERATURE: 97.9 F | HEIGHT: 70 IN

## 2023-01-17 DIAGNOSIS — M48.062 SPINAL STENOSIS OF LUMBAR REGION WITH NEUROGENIC CLAUDICATION: ICD-10-CM

## 2023-01-17 DIAGNOSIS — M47.816 LUMBAR SPONDYLOSIS: ICD-10-CM

## 2023-01-17 DIAGNOSIS — E11.42 DIABETIC PERIPHERAL NEUROPATHY (HCC): ICD-10-CM

## 2023-01-17 DIAGNOSIS — G89.4 CHRONIC PAIN SYNDROME: Primary | ICD-10-CM

## 2023-01-17 DIAGNOSIS — M51.27 HERNIATED NUCLEUS PULPOSUS OF LUMBOSACRAL REGION: ICD-10-CM

## 2023-01-17 NOTE — PATIENT INSTRUCTIONS
Spinal Cord Stimulator Placement   WHAT YOU NEED TO KNOW:   What do I need to know about spinal cord stimulator placement? A spinal cord stimulator (SCS) is a device used to control pain after other treatments have not worked  The SCS delivers a small amount of electrical current to your spinal cord to block pain  SCS placement surgery is done in 2 stages  In the first stage, a temporary SCS is placed and left in for about a week  In the second stage, a permanent SCS is placed if the temporary device reduced your pain  You will get a remote control to turn the pulse generator on and off and adjust the pulses  How do I prepare for surgery? Your surgeon will tell you how to prepare  He or she may tell you not to eat or drink anything after midnight on the day of surgery  Arrange to have someone drive you home when you are discharged from the hospital     Tell your surgeon about all medicines you currently take  Be sure to include any medicine that may cause you to bleed more, such as aspirin or blood thinners  Your surgeon will tell you if you need to stop any of your medicine for the surgery, and when to stop  He or she will tell you which medicines to take or not take on the day of surgery  Tell your surgeon if you have a blood disorder or had a bleeding problem  You may need blood or urine tests to check for infection and make sure your body is okay for surgery  You may also need an MRI to check your spine before your healthcare provider places the SCS  Ask your surgeon for more information about these and other tests you may need  What will happen during surgery? Temporary lead placement:      You may get general anesthesia to keep you asleep during surgery  You may get local anesthesia to numb the surgery area  Local anesthesia allows you to stay awake during the surgery so you can tell your surgeon when your pain decreases  This helps him or her make sure the SCS is in the best spot      Your surgeon will place electrical leads through a small incision or a needle inserted into your back  He or she may need to remove a small piece of bone to insert the leads along your spine  He or she will use an x-ray to make sure the leads are in the correct place  The incision will be covered with bandages  The leads will be connected to wires and attached to a pulse generator placed outside your body  Permanent lead placement:      Your surgeon will remove the temporary lead and replace it with a new, permanent lead through an incision or needle in your back  He or she will make another incision in your abdomen or buttocks  Then he or she will make a small pocket under your skin and place the SCS  Wires will be attached to the SCS  The wires will be tunneled under your skin  Your surgeon will connect the wires to the leads placed in your spine  The incisions will be closed with stitches and covered with a bandage  What should I expect after surgery? You will be taken to a room to rest until you are fully awake  Healthcare providers will monitor you closely for any problems  Do not get out of bed until your healthcare provider says it is okay  SCS settings  on the remote control can be changed to help relieve your pain  Your healthcare provider will show you how to adjust the settings  Medicines  may be given for surgery pain or to prevent a bacterial infection  Ice packs  may be used to decrease the pain from the incisions  What are the risks of spinal cord stimulator placement? The spinal cord stimulator may not work correctly and you may need to have it replaced  You may develop a headache, or your pain may get worse  Surgery may cause you to bleed or to leak spinal fluid  After surgery, you may get an infection at the incision site  You may also get a serious infection near where the leads are placed  This may cause paralysis or become life-threatening    CARE AGREEMENT:   You have the right to help plan your care  Learn about your health condition and how it may be treated  Discuss treatment options with your healthcare providers to decide what care you want to receive  You always have the right to refuse treatment  The above information is an  only  It is not intended as medical advice for individual conditions or treatments  Talk to your doctor, nurse or pharmacist before following any medical regimen to see if it is safe and effective for you  © Copyright [employer] [facility] 2022 Information is for End User's use only and may not be sold, redistributed or otherwise used for commercial purposes   All illustrations and images included in CareNotes® are the copyrighted property of [employer]  or [address] [place] [address] [employer]

## 2023-01-17 NOTE — PROGRESS NOTES
Assessment:  1  Chronic pain syndrome    2  Diabetic peripheral neuropathy ([address] )    3  Herniated nucleus pulposus of lumbosacral region    4  Lumbar spondylosis    5  Spinal stenosis of lumbar region with neurogenic claudication        Plan:  I had a very thorough conversation with the patient regarding the spinal cord stimulator trial and possible permanent implantation  I explained that there is pre-authorization process, including the need to proceed with an education class and psychological evaluation, both of which must be completed, before we can consider proceeding with the spinal cord stimulator trial pre-authorization process with the insurance company  I spent a significant amount of time reviewing the basic theory of spinal cord stimulation as well as the hope that it would provide at least moderate improvement and improve the patient's pain and overall functions with regards to performing activities of daily living and leisure activities with as little pain as possible  I feel that with regards to the stimulator process, I answered the patient's questions to the best of my abilities and until the patient was thoroughly satisfied  I also thoroughly discussed with the patient the realistic expecations of even a possible or eventual permanent Spinal Cord Stimulator and that overall our goal is to reduce the pain by 50%, 50% of the time, understanding that there are still going to be good days and bad days and that the stimulator is to be a tool to help manage the pain, not cure their pain  The patient was agreeable and verbalized an understanding  I discussed with the patient that at this point we will proceed with the spinal cord stimulator trial as scheduled   While the patient was in the office today we did thoroughly review the spinal cord stimulator trial process and discussed what to expect before, during, and after the trial  The pre-procedure instructions were thoroughly reviewed and a copy was sent home with the patient  The patient was given prescriptions for pre-procedure blood work today with the instructions to have them completed prior to the trial  The patient was given ample time to ask questions in regards to the spinal cord stimulator trial and the patient's questions were answered until they were thoroughly satisfied  Complete risks and benefits including bleeding, infection, tissue reaction, nerve injury and allergic reaction were discussed  The approach was demonstrated using models  Verbal and written consent was obtained  The patient will follow-up as scheduled after his stimulator trial for the trial lead removal on January 31, 2023  The patient and his wife are agreeable and verbalized an understanding  History of Present Illness: The patient is a 74 y o  male last seen on 9/1/2022 who presents for a follow up office visit in regards to chronic pain syndrome, as the patient's pain has been ongoing for greater than a year, secondary to herniated lumbar disc spondylosis and stenosis as well as diabetic peripheral neuropathy  The patient currently reports that since his last office visit he continues with the significant and chronic low back pain and radicular symptoms as well as the chronic leg and foot pain secondary to diabetic neuropathy  The patient presents today to proceed with his H&P for his [employer] thoracic lead spinal cord stimulator trial on January 26, 2023 with Dr Vicki Erickson  Current pain medications includes: Celebrex 200 mg twice daily as needed for pain  The patient reports that this regimen is providing normal pain relief  The patient is reporting no side effects from this pain medication regimen  I have personally reviewed and/or updated the patient's past medical history, past surgical history, family history, social history, current medications, allergies, and vital signs today         Review of Systems:    Review of Systems   Respiratory: Negative for shortness of breath  Cardiovascular: Negative for chest pain  Gastrointestinal: Negative for constipation, diarrhea, nausea and vomiting  Musculoskeletal: Positive for gait problem and joint swelling (Joint stiffness)  Negative for arthralgias and myalgias  Skin: Negative for rash  Neurological: Positive for seizures and weakness  Negative for dizziness  All other systems reviewed and are negative  Past Medical History:   Diagnosis Date   • Anxiety    • Arthritis    • Cancer ([facility] [address] )    • Depression    • Diabetes mellitus ([facility] )    • Hypertension    • Liver disease    • Mitral valve prolapse    • Thyroid disease        Past Surgical History:   Procedure Laterality Date   • INCISION AND DRAINAGE OF WOUND Left 2022    Procedure: INCISION AND DRAINAGE (I&D) EXTREMITY;  Surgeon: Delfina Babcock DPM;  Location:  MAIN OR;  Service: Podiatry   • JOINT REPLACEMENT Left 2022    Left TSA   • THYROID SURGERY      remove cancer       History reviewed  No pertinent family history      Social History     Occupational History   • Not on file   Tobacco Use   • Smoking status: Former     Packs/day: 0 25     Types: Cigarettes     Quit date:      Years since quittin 0   • Smokeless tobacco: Never   • Tobacco comments:     quit    Vaping Use   • Vaping Use: Never used   Substance and Sexual Activity   • Alcohol use: Yes     Comment: occasional, social   • Drug use: Not Currently     Types: Marijuana   • Sexual activity: Not Currently         Current Outpatient Medications:   •  Acetaminophen (TYLENOL EXTRA STRENGTH PO), Take 3 tablets by mouth every 6 (six) hours, Disp: , Rfl:   •  celecoxib (CeleBREX) 200 mg capsule, Take 1 PO BID PRN for pain with food and NO other NSAIDS , Disp: 60 capsule, Rfl: 2  •  CHOLECALCIFEROL PO, Take 5,000 Units by mouth daily, Disp: , Rfl:   •  clonazePAM (KlonoPIN) 1 mg tablet, Take 1 mg by mouth 2 (two) times a day, Disp: , Rfl:   •  cyclobenzaprine (FLEXERIL) 10 mg tablet, Take 10 mg by mouth daily at bedtime as needed, Disp: , Rfl:   •  folic acid (FOLVITE) 1 mg tablet, Take 2,000 mcg by mouth daily, Disp: , Rfl: 6  •  GNP ASPIRIN LOW DOSE 81 MG EC tablet, Take 81 mg by mouth daily, Disp: , Rfl: 0  •  hydrOXYzine pamoate (VISTARIL) 25 mg capsule, Take 50 mg by mouth daily at bedtime, Disp: , Rfl:   •  Jardiance 10 MG TABS, Take 10 mg by mouth daily, Disp: , Rfl:   •  Lantus SoloStar 100 units/mL SOPN, Inject 37 mg under the skin 2 (two) times a day with meals, Disp: , Rfl:   •  losartan-hydrochlorothiazide (HYZAAR) 100-25 MG per tablet, Take 1 tablet by mouth daily, Disp: , Rfl: 0  •  lovastatin (MEVACOR) 20 mg tablet, Take 20 mg by mouth daily, Disp: , Rfl: 3  •  metFORMIN (GLUCOPHAGE) 1000 MG tablet, Take 1,000 mg by mouth 2 (two) times a day with meals , Disp: , Rfl:   •  metoprolol succinate (TOPROL-XL) 50 mg 24 hr tablet, Take 75 mg by mouth daily, Disp: , Rfl:   •  mirtazapine (REMERON) 45 MG tablet, Take 45 mg by mouth daily at bedtime, Disp: , Rfl: 1  •  NIFEdipine (PROCARDIA XL) 30 mg 24 hr tablet, TAKE 1 TABLET BY MOUTH DAILY in addition TO 90mg FOR A total OF 120mg DAILY, Disp: , Rfl:   •  NIFEdipine (PROCARDIA XL) 90 mg 24 hr tablet, Take 90 mg by mouth daily, Disp: , Rfl: 3  •  sertraline (ZOLOFT) 25 mg tablet, Take 100 mg by mouth daily, Disp: , Rfl:   •  ACCU-CHEK FABIANO PLUS test strip, 4 (four) times a day, Disp: , Rfl: 1  •  ACCU-CHEK FASTCLIX LANCETS MISC, 4 (four) times a day, Disp: , Rfl: 1  •  cetirizine (ZyrTEC) 10 mg tablet, Take 10 mg by mouth daily (Patient not taking: Reported on 1/17/2023), Disp: , Rfl: 2  •  docusate sodium (COLACE) 100 mg capsule, Take 1 capsule (100 mg total) by mouth 2 (two) times a day (Patient not taking: Reported on 1/17/2023), Disp: 14 capsule, Rfl: 0  •  DULoxetine (CYMBALTA) 60 mg delayed release capsule, Take 60 mg by mouth 2 (two) times a day not taking (Patient not taking: Reported on 1/17/2023), Disp: , Rfl: •  GLOBAL INJECT EASE INSULIN SYR 31G X 5/16" 1 ML MISC, 2 (two) times a day, Disp: , Rfl: 0  •  ibuprofen (MOTRIN) 400 mg tablet, Take 800 mg by mouth 3 (three) times a day not taking (Patient not taking: Reported on 1/17/2023), Disp: , Rfl: 1  •  ketoconazole (NIZORAL) 2 % shampoo, daily Use as directed, Disp: , Rfl: 6  •  LORazepam (ATIVAN) 1 mg tablet, Take 1 mg by mouth 2 (two) times a day as needed (Patient not taking: Reported on 11/16/2022), Disp: , Rfl:   •  methylprednisolone (MEDROL) 4 mg tablet, Take 1 tablet (4 mg total) by mouth see administration instructions Medrol dose pack take as directed (Patient not taking: Reported on 1/17/2023), Disp: 21 tablet, Rfl: 0  •  patient supplied medication, [employer] leg and back pain relief po, 2 tabs, 4x daily (Patient not taking: Reported on 12/7/2022), Disp: , Rfl:   •  senna (SENOKOT) 8 6 mg, Take 2 tablets (17 2 mg total) by mouth 2 (two) times a day (Patient not taking: Reported on 1/17/2023), Disp: 28 tablet, Rfl: 0    Allergies   Allergen Reactions   • Cephalexin Rash   • Abilify [Aripiprazole] Other (See Comments)     Shaking     • Molds & Smuts        Physical Exam:    /82 (BP Location: Left arm, Patient Position: Sitting, Cuff Size: Standard)   Pulse 105   Temp 97 9 °F (36 6 °C)   Ht 5' 10" (1 778 m)   Wt 86 6 kg (191 lb)   BMI 27 41 kg/m²     Constitutional:normal, well developed, well nourished, alert, in no distress and non-toxic and no overt pain behavior  Eyes:anicteric  HEENT:grossly intact  Neck:supple, symmetric, trachea midline and no masses   Pulmonary:even and unlabored  Cardiovascular:No edema or pitting edema present  Skin:Normal without rashes or lesions and well hydrated  Psychiatric:Mood and affect appropriate  Neurologic:Cranial Nerves II-XII grossly intact  Musculoskeletal:The patient's gait is slightly antalgic, painful, but steady with the use of a single cane and a lumbar support brace        Imaging  No orders to display         No orders of the defined types were placed in this encounter

## 2023-01-19 LAB
ALBUMIN SERPL-MCNC: 5 G/DL (ref 3.8–4.8)
ALBUMIN/GLOB SERPL: 2 {RATIO} (ref 1.2–2.2)
ALP SERPL-CCNC: 95 IU/L (ref 44–121)
ALT SERPL-CCNC: 18 IU/L (ref 0–44)
APTT PPP: 28 SEC (ref 24–33)
AST SERPL-CCNC: 22 IU/L (ref 0–40)
BASOPHILS # BLD AUTO: 0.1 X10E3/UL (ref 0–0.2)
BASOPHILS NFR BLD AUTO: 1 %
BILIRUB SERPL-MCNC: 0.3 MG/DL (ref 0–1.2)
BUN SERPL-MCNC: 14 MG/DL (ref 8–27)
BUN/CREAT SERPL: 14 (ref 10–24)
CALCIUM SERPL-MCNC: 10.1 MG/DL (ref 8.6–10.2)
CHLORIDE SERPL-SCNC: 98 MMOL/L (ref 96–106)
CO2 SERPL-SCNC: 24 MMOL/L (ref 20–29)
CREAT SERPL-MCNC: 1.03 MG/DL (ref 0.76–1.27)
EGFR: 81 ML/MIN/1.73
EOSINOPHIL # BLD AUTO: 0.2 X10E3/UL (ref 0–0.4)
EOSINOPHIL NFR BLD AUTO: 2 %
ERYTHROCYTE [DISTWIDTH] IN BLOOD BY AUTOMATED COUNT: 15.2 % (ref 11.6–15.4)
GLOBULIN SER-MCNC: 2.5 G/DL (ref 1.5–4.5)
GLUCOSE SERPL-MCNC: 81 MG/DL (ref 70–99)
HBA1C MFR BLD: 6.5 % (ref 4.8–5.6)
HCT VFR BLD AUTO: 42.7 % (ref 37.5–51)
HGB BLD-MCNC: 13.9 G/DL (ref 13–17.7)
IMM GRANULOCYTES # BLD: 0 X10E3/UL (ref 0–0.1)
IMM GRANULOCYTES NFR BLD: 0 %
INR PPP: 1 (ref 0.9–1.2)
LYMPHOCYTES # BLD AUTO: 2.2 X10E3/UL (ref 0.7–3.1)
LYMPHOCYTES NFR BLD AUTO: 25 %
MCH RBC QN AUTO: 27.2 PG (ref 26.6–33)
MCHC RBC AUTO-ENTMCNC: 32.6 G/DL (ref 31.5–35.7)
MCV RBC AUTO: 84 FL (ref 79–97)
MONOCYTES # BLD AUTO: 0.6 X10E3/UL (ref 0.1–0.9)
MONOCYTES NFR BLD AUTO: 7 %
NEUTROPHILS # BLD AUTO: 5.8 X10E3/UL (ref 1.4–7)
NEUTROPHILS NFR BLD AUTO: 65 %
PLATELET # BLD AUTO: 387 X10E3/UL (ref 150–450)
POTASSIUM SERPL-SCNC: 4.2 MMOL/L (ref 3.5–5.2)
PROT SERPL-MCNC: 7.5 G/DL (ref 6–8.5)
PROTHROMBIN TIME: 9.9 SEC (ref 9.1–12)
RBC # BLD AUTO: 5.11 X10E6/UL (ref 4.14–5.8)
SODIUM SERPL-SCNC: 141 MMOL/L (ref 134–144)
WBC # BLD AUTO: 8.9 X10E3/UL (ref 3.4–10.8)

## 2023-01-25 ENCOUNTER — TELEPHONE (OUTPATIENT)
Dept: NEUROSURGERY | Facility: CLINIC | Age: 68
End: 2023-01-25

## 2023-01-25 NOTE — TELEPHONE ENCOUNTER
Faxed images from patient Dexa Scan done 1/19/23 be pushed through from [facility] Fax # [phone]

## 2023-01-26 ENCOUNTER — TELEPHONE (OUTPATIENT)
Dept: PAIN MEDICINE | Facility: CLINIC | Age: 68
End: 2023-01-26

## 2023-01-26 ENCOUNTER — HOSPITAL ENCOUNTER (OUTPATIENT)
Dept: RADIOLOGY | Facility: CLINIC | Age: 68
Discharge: HOME | End: 2023-01-26
Admitting: ANESTHESIOLOGY

## 2023-01-26 VITALS
DIASTOLIC BLOOD PRESSURE: 78 MMHG | OXYGEN SATURATION: 96 % | TEMPERATURE: 97.3 F | HEART RATE: 100 BPM | RESPIRATION RATE: 18 BRPM | SYSTOLIC BLOOD PRESSURE: 124 MMHG

## 2023-01-26 DIAGNOSIS — M47.816 LUMBAR SPONDYLOSIS: ICD-10-CM

## 2023-01-26 DIAGNOSIS — G89.4 CHRONIC PAIN SYNDROME: ICD-10-CM

## 2023-01-26 DIAGNOSIS — G89.4 CHRONIC PAIN SYNDROME: Primary | ICD-10-CM

## 2023-01-26 DIAGNOSIS — M54.16 LUMBAR RADICULOPATHY: ICD-10-CM

## 2023-01-26 RX ORDER — CIPROFLOXACIN 500 MG/1
500 TABLET, FILM COATED ORAL EVERY 12 HOURS SCHEDULED
Qty: 14 TABLET | Refills: 0 | Status: SHIPPED | OUTPATIENT
Start: 2023-01-26 | End: 2023-02-02

## 2023-01-26 RX ADMIN — LIDOCAINE HYDROCHLORIDE 10 ML: 10; .005 INJECTION, SOLUTION EPIDURAL; INFILTRATION; INTRACAUDAL; PERINEURAL at 11:06

## 2023-01-26 RX ADMIN — SODIUM CHLORIDE 900 MG: 9 INJECTION, SOLUTION INTRAVENOUS at 10:20

## 2023-01-26 NOTE — TELEPHONE ENCOUNTER
S/w pt's wife, Yared Keys, per medical communication consent on file  Chani Fischer is calling to confirm, ok to resume tylenol and flexeril  Artice Borders, ok to resume  No NSAIDS; asa and Celebrex should continue to be held  Chani Fischer verbalized understanding and appreciation

## 2023-01-26 NOTE — TELEPHONE ENCOUNTER
Patient is S/P a [employer] SCS trial c/ SL on 1/26/23  Lead pull scheduled    Please call on 1/27/23 post trial  Thanks

## 2023-01-26 NOTE — DISCHARGE INSTRUCTIONS
"Stimulant Use Disorder-Methamphetamines  Methamphetamines are one of a group of powerful drugs known as stimulants. Methamphetamine is a form of amphetamine. It is rarely used medically but is often misused because of the effects it produces. These effects include:  · A feeling of extreme pleasure (euphoria).  · Alertness.  · High energy.  · Increased sexuality.  Common street names for methamphetamine are meth, crystal, ice, glass, and chalk. This drug can be taken by mouth, smoked, snorted, or dissolved in water and injected.  Stimulants are addictive because they activate regions of the brain that are responsible for producing both the pleasurable sensation of \"reward\" and psychological dependence. Together, these actions account for loss of control and the rapid development of drug dependence. This means the user will become ill without the drug (withdrawal) and will need to keep using it to function.   Stimulant use disorder is use of stimulants that disrupts your daily life. It disrupts relationships with family and friends and how you do your job. Methamphetamine increases blood pressure and heart rate. Use can cause a heart attack or stroke. Methamphetamine can also cause death from irregular heart rate or seizures.  SIGNS AND SYMPTOMS   Signs and symptoms of stimulant use disorder with methamphetamine include:  · Use of methamphetamines in larger amounts or over a longer period than intended.  · Unsuccessful attempts to cut down or control methamphetamine use.  · A lot of time spent obtaining, using, or recovering from the effects of methamphetamines.  · A strong desire or urge to use methamphetamines (craving).  · Continued use of methamphetamines in spite of major problems at work, school, or home because of use.  · Continued use of methamphetamines in spite of relationship problems because of use.  · Giving up or cutting down on important life activities because of use of methamphetamines.  · Use of " SPINE AND PAIN: SPINAL CORD STIMULATION/PERIPHERAL NERVE STIMULATION TRIAL/ PERMANENT IMPLANT DISCHARGE INSTRUCTIONS    ACTIVITY RESTRICTIONS DURING TRIAL PERIOD  Do not drive with the SCS "on"  Do not bend or twist at the waist   Do not lift anything that weighs over 5 pounds  When you lie down, "log roll" (keep spine aligned) to change positions  Do not sleep on your stomach  Limit stair climbing and strenuous activity  CARE OF THE INJECTION SITE  CHECK THE DRESSING DAILY  It should intact  Notify the [facility] if you have any of the following: redness, drainage, swelling, chills or fever over 100°F   Do not change dressing, only reinforce edges if necessary  Keep the dressing dry  Do not shower, (sponge baths only) until evaluated in office  SPECIAL INSTRUCTIONS  Take your temperature daily  Follow-up for lead removal scheduled for ***  The  box is expensive  DO NOT drop or get wet  Do not pull on the cable or leads  Do not disconnect the cable and lead  Do activities that normally cause you to have pain and try different  settings  Obtain post procedure x-ray shortly after procedure if ordered by physician  MEDICATIONS  Continue to take all routine medications  Our office may have instructed you to hold some medications  You may resume _________celebrex, asprin after leads are pulled in office visit  ______________________________________  Take antiobiotic as prescribed:_____________________________________  If you have any problems specifically related to your procedure, please call our office at [phone]  Problems not related to your procedure should be directed at your primary care physician  Contact the company representative with any questions regarding settings or operation of the external  box  As no general anesthesia was used in today's procedure, you should not experience any side effects related to anesthesia  methamphetamines over and over in situations when it is physically hazardous, such as driving a car.  · Continued use of methamphetamines in spite of a physical problem that is likely related to use. Physical problems can include:  ¨ Extreme weight loss.  ¨ Malnutrition.  ¨ Jaw clenching.  ¨ Severe dental problems (meth mouth).  ¨ Lung problems (due to smoking).  ¨ Skin sores (due to scratching).  ¨ Infections such as human immunodeficiency virus (HIV) and hepatitis (from injecting methamphetamines).  · Continued use of methamphetamines in spite of a mental problem that is likely related to use. Mental problems can include:  ¨ Memory problems.  ¨ Schizophrenia-like symptoms.  ¨ Depression.  ¨ Bipolar mood swings.  ¨ Violent behavior.  ¨ Anxiety.  ¨ Sleep problems.  · Need to use more and more methamphetamines to get the same effect, or lessened effect over time with use of the same amount (tolerance).  · Having withdrawal symptoms when methamphetamine use is stopped, or using methamphetamines to reduce or avoid withdrawal symptoms. Withdrawal symptoms include:  ¨ Depressed or irritable mood.  ¨ Low energy or restlessness.  ¨ Bad dreams.  ¨ Too little or too much sleep.  ¨ Increased appetite.  DIAGNOSIS  Stimulant use disorder is diagnosed by your health care provider. You may be asked questions about your methamphetamine use and how it affects your life. A physical exam may be done. A drug screen may be ordered. You may be referred to a mental health professional. The diagnosis of stimulant use disorder requires at least two symptoms within 12 months. The type of stimulant use disorder depends on the number of symptoms you have. The type may be:  · Mild. Two or three signs and symptoms.  · Moderate. Four or five signs and symptoms.  · Severe. Six or more signs and symptoms.  TREATMENT   There are two types of treatment:   · Short-term (urgent) medical treatment. This helps to preserve life and prevent or minimize  damage from the physical or mental problems related to methamphetamine use.    · Long-term substance abuse treatment. This focuses on recovery from use disorder. It is provided by mental health professionals who have training in substance use disorders. It is usually a combination of counseling, support groups, and nonaddictive medicines that can reduce cravings or block the effects of methamphetamine.  HOME CARE INSTRUCTIONS   · Take medicines only as directed by your health care provider.  · Identify the people and activities that trigger your methamphetamine use and avoid them.  · Keep all follow-up visits as directed by your health care provider.  · Brush and floss your teeth every day. Do this 2 times a day if you can. Also use an antibacterial mouthwash.  · Avoid sugary drinks.  · Do not smoke.  SEEK MEDICAL CARE IF:  · Your symptoms get worse or you relapse.  · You are not able to take medicines as directed.    SEEK IMMEDIATE MEDICAL CARE IF:   · You have serious thoughts about hurting yourself or others.  · You have a seizure, chest pain, sudden weakness, or loss of speech or vision.  FOR MORE INFORMATION  · National [place] on Drug Abuse: www.drugabuse.gov  · Substance Abuse and Mental Health Services Administration: www.samhsa.gov     This information is not intended to replace advice given to you by your health care provider. Make sure you discuss any questions you have with your health care provider.     Document Released: 08/23/2005 Document Revised: 01/08/2016 Document Reviewed: 12/31/2014  Elsevier Interactive Patient Education ©2016 [employer]

## 2023-01-26 NOTE — H&P
History of Present Illness: The patient is a 74 y o  male who presents with complaints of low back and leg pain  Past Medical History:   Diagnosis Date   • Anxiety    • Arthritis    • Cancer ([facility] [address] )    • Depression    • Diabetes mellitus ([facility] [address] )    • Hypertension    • Liver disease    • Mitral valve prolapse    • Thyroid disease        Past Surgical History:   Procedure Laterality Date   • INCISION AND DRAINAGE OF WOUND Left 8/12/2022    Procedure: INCISION AND DRAINAGE (I&D) EXTREMITY;  Surgeon: Jacoby Orr DPM;  Location:  MAIN OR;  Service: Podiatry   • JOINT REPLACEMENT Left 03/11/2022    Left TSA   • THYROID SURGERY  2021    remove cancer         Current Outpatient Medications:   •  ACCU-CHEK FABIANO PLUS test strip, 4 (four) times a day, Disp: , Rfl: 1  •  ACCU-CHEK FASTCLIX LANCETS MISC, 4 (four) times a day, Disp: , Rfl: 1  •  Acetaminophen (TYLENOL EXTRA STRENGTH PO), Take 3 tablets by mouth every 6 (six) hours, Disp: , Rfl:   •  celecoxib (CeleBREX) 200 mg capsule, Take 1 PO BID PRN for pain with food and NO other NSAIDS   (Patient not taking: Reported on 1/26/2023), Disp: 60 capsule, Rfl: 2  •  cetirizine (ZyrTEC) 10 mg tablet, Take 10 mg by mouth daily (Patient not taking: Reported on 1/17/2023), Disp: , Rfl: 2  •  CHOLECALCIFEROL PO, Take 5,000 Units by mouth daily, Disp: , Rfl:   •  clonazePAM (KlonoPIN) 1 mg tablet, Take 1 mg by mouth 2 (two) times a day, Disp: , Rfl:   •  cyclobenzaprine (FLEXERIL) 10 mg tablet, Take 10 mg by mouth daily at bedtime as needed, Disp: , Rfl:   •  docusate sodium (COLACE) 100 mg capsule, Take 1 capsule (100 mg total) by mouth 2 (two) times a day (Patient not taking: Reported on 1/17/2023), Disp: 14 capsule, Rfl: 0  •  DULoxetine (CYMBALTA) 60 mg delayed release capsule, Take 60 mg by mouth 2 (two) times a day not taking (Patient not taking: Reported on 1/17/2023), Disp: , Rfl:   •  folic acid (FOLVITE) 1 mg tablet, Take 2,000 mcg by mouth daily, Disp: , Rfl: 6  • GLOBAL INJECT EASE INSULIN SYR 31G X 5/16" 1 ML MISC, 2 (two) times a day, Disp: , Rfl: 0  •  GNP ASPIRIN LOW DOSE 81 MG EC tablet, Take 81 mg by mouth daily, Disp: , Rfl: 0  •  hydrOXYzine pamoate (VISTARIL) 25 mg capsule, Take 50 mg by mouth daily at bedtime, Disp: , Rfl:   •  ibuprofen (MOTRIN) 400 mg tablet, Take 800 mg by mouth 3 (three) times a day not taking (Patient not taking: Reported on 1/17/2023), Disp: , Rfl: 1  •  Jardiance 10 MG TABS, Take 10 mg by mouth daily, Disp: , Rfl:   •  ketoconazole (NIZORAL) 2 % shampoo, daily Use as directed, Disp: , Rfl: 6  •  Lantus SoloStar 100 units/mL SOPN, Inject 37 mg under the skin 2 (two) times a day with meals, Disp: , Rfl:   •  LORazepam (ATIVAN) 1 mg tablet, Take 1 mg by mouth 2 (two) times a day as needed (Patient not taking: Reported on 11/16/2022), Disp: , Rfl:   •  losartan-hydrochlorothiazide (HYZAAR) 100-25 MG per tablet, Take 1 tablet by mouth daily, Disp: , Rfl: 0  •  lovastatin (MEVACOR) 20 mg tablet, Take 20 mg by mouth daily, Disp: , Rfl: 3  •  metFORMIN (GLUCOPHAGE) 1000 MG tablet, Take 1,000 mg by mouth 2 (two) times a day with meals , Disp: , Rfl:   •  methylprednisolone (MEDROL) 4 mg tablet, Take 1 tablet (4 mg total) by mouth see administration instructions Medrol dose pack take as directed (Patient not taking: Reported on 1/17/2023), Disp: 21 tablet, Rfl: 0  •  metoprolol succinate (TOPROL-XL) 50 mg 24 hr tablet, Take 75 mg by mouth daily, Disp: , Rfl:   •  mirtazapine (REMERON) 45 MG tablet, Take 45 mg by mouth daily at bedtime, Disp: , Rfl: 1  •  NIFEdipine (PROCARDIA XL) 30 mg 24 hr tablet, TAKE 1 TABLET BY MOUTH DAILY in addition TO 90mg FOR A total OF 120mg DAILY, Disp: , Rfl:   •  NIFEdipine (PROCARDIA XL) 90 mg 24 hr tablet, Take 90 mg by mouth daily, Disp: , Rfl: 3  •  patient supplied medication, [employer] leg and back pain relief po, 2 tabs, 4x daily (Patient not taking: Reported on 12/7/2022), Disp: , Rfl:   •  senna (SENOKOT) 8 6 mg, Take 2 tablets (17 2 mg total) by mouth 2 (two) times a day (Patient not taking: Reported on 1/17/2023), Disp: 28 tablet, Rfl: 0  •  sertraline (ZOLOFT) 25 mg tablet, Take 100 mg by mouth daily, Disp: , Rfl:     Current Facility-Administered Medications:   •  clindamycin (CLEOCIN) 900 mg in sodium chloride 0 9 % 50 mL IVPB, 900 mg, Intravenous, Once, Tommy Barrera DO, Last Rate: 100 mL/hr at 01/26/23 1020, 900 mg at 01/26/23 1020  •  lidocaine-epinephrine (XYLOCAINE-MPF/EPINEPHRINE) 1%-1:200,000 injection 1 mL, 1 mL, Infiltration, Once, Tommy Barrera DO    Allergies   Allergen Reactions   • Cephalexin Rash   • Abilify [Aripiprazole] Other (See Comments)     Shaking     • Molds & Smuts    • Pregabalin Other (See Comments)       Physical Exam:   Vitals:    01/26/23 1005   BP: 138/82   Pulse: (!) 111   Resp: 18   Temp: (!) 97 3 °F (36 3 °C)   SpO2: 97%     General: Awake, Alert, Oriented x 3, Mood and affect appropriate  Respiratory: Respirations even and unlabored  Cardiovascular: Peripheral pulses intact; no edema  Musculoskeletal Exam: Ambulates with a cane    ASA Score: II    Patient/Chart Verification  Patient ID Verified: Verbal  ID Band Applied: No  Consents Confirmed: Procedural, To be obtained in the Pre-Procedure area  Interval H&P(within 24 hr) Complete (required for Outpatients and Surgery Admit only): To be obtained in the Pre-Procedure area  Allergies Reviewed: Yes  Anticoag/NSAID held?: Yes (CLAUDIA ASA 1/19/23, CLAUDIA celebrex 1/21/23, SL aware)  Currently on antibiotics?: No    Assessment:   1  Chronic pain syndrome    2  Lumbar spondylosis    3   Lumbar radiculopathy        Plan: [employer] SCS TRIAL

## 2023-01-26 NOTE — TELEPHONE ENCOUNTER
Caller: Piper Dominguez     Doctor: Jane Salinas     Reason for call:  Patient returning nurses call-transferred to clinical

## 2023-01-26 NOTE — TELEPHONE ENCOUNTER
Caller: Kings Dutton    Doctor: Ashia Gregg    Reason for call: caller wants to know if patient can take tylenol and flexeril while having the stimulator implanted    Call back#: [phone]- call pt back

## 2023-01-27 NOTE — TELEPHONE ENCOUNTER
S/w pt, stated that he has some pain  S/w clemente from [employer] and reset the device  Confirmed abx, has started them and will take them as directed  Advised pt, limit bending / lifting / twisting to preserve the integrity of the scs lead as well as the dressing  Do not try to replace the dressing, ok to reinforce if necessary  Please call the office if the dressing becomes loose, soiled or if there is any pooling of fluid or bright red blood  Advised pt to take his temp daily and cb if s/s infection present; redness, drainage, swelling at the site or fever 100+  Please contact [employer] rep w/ questions re: the device / pain, call this office will questions re: dressing / procedure site or medication  Pt verbalized understanding and appreciation  Confirmed 1/31/23 appt  Will cb sooner if questions / issues arise

## 2023-01-31 ENCOUNTER — OFFICE VISIT (OUTPATIENT)
Dept: PAIN MEDICINE | Facility: CLINIC | Age: 68
End: 2023-01-31

## 2023-01-31 ENCOUNTER — TELEMEDICINE (OUTPATIENT)
Dept: BEHAVIORAL/MENTAL HEALTH CLINIC | Facility: CLINIC | Age: 68
End: 2023-01-31

## 2023-01-31 VITALS
DIASTOLIC BLOOD PRESSURE: 88 MMHG | TEMPERATURE: 97.9 F | SYSTOLIC BLOOD PRESSURE: 160 MMHG | HEART RATE: 99 BPM | HEIGHT: 70 IN | BODY MASS INDEX: 27.41 KG/M2

## 2023-01-31 DIAGNOSIS — M54.50 CHRONIC BILATERAL LOW BACK PAIN WITHOUT SCIATICA: ICD-10-CM

## 2023-01-31 DIAGNOSIS — M51.27 HERNIATED NUCLEUS PULPOSUS OF LUMBOSACRAL REGION: ICD-10-CM

## 2023-01-31 DIAGNOSIS — M47.816 LUMBAR SPONDYLOSIS: ICD-10-CM

## 2023-01-31 DIAGNOSIS — G89.4 CHRONIC PAIN SYNDROME: Primary | ICD-10-CM

## 2023-01-31 DIAGNOSIS — M48.062 SPINAL STENOSIS OF LUMBAR REGION WITH NEUROGENIC CLAUDICATION: ICD-10-CM

## 2023-01-31 DIAGNOSIS — M54.16 LUMBAR RADICULOPATHY: ICD-10-CM

## 2023-01-31 DIAGNOSIS — F31.81 BIPOLAR II DISORDER, MOST RECENT EPISODE MAJOR DEPRESSIVE (HCC): Primary | ICD-10-CM

## 2023-01-31 DIAGNOSIS — G89.29 CHRONIC BILATERAL LOW BACK PAIN WITHOUT SCIATICA: ICD-10-CM

## 2023-01-31 RX ORDER — CELECOXIB 200 MG/1
CAPSULE ORAL
Qty: 60 CAPSULE | Refills: 2 | Status: SHIPPED | OUTPATIENT
Start: 2023-01-31

## 2023-01-31 NOTE — BH TREATMENT PLAN
Outpatient Behavioral Health Psychotherapy Treatment Plan    Marla Rm  1955     Date of Initial Psychotherapy Assessment: 1/31/23   Date of Current Treatment Plan: 01/31/23  Treatment Plan Target Date: 1/31/23   Treatment Plan Expiration Date: 1/31/23    Diagnosis:   1  Bipolar II disorder, most recent episode major depressive ([facility] [address] )            Area(s) of Need: Help with pain, trouble walking for more than 5 minutes  Long Term Goal 1 (in the client's own words): I want to address my depression issues by writing down my list of coping skills and using them  Stage of Change: Preparation    Target Date for completion: Review in 6 months     Anticipated therapeutic modalities: CBT, Supportive Therapy, Solution Focusedf   People identified to complete this goal: Client, with the help of his family      Objective 1: (identify the means of measuring success in meeting the objective): I want to depression score average to go from an 8 to a 6  Objective 2: (identify the means of measuring success in meeting the objective): I want to use two coping skills per day to see if that helps me manage my depression  I will track in my calendar  Long Term Goal 2 (in the client's own words): I will finding jokes, and inspiring quotes and sharing them with others  Stage of Change: Preparation    Target Date for completion: Review in 5-6 months     Anticipated therapeutic modalities: Solution Focused, and CBT     People identified to complete this goal: Client, with assistance from his family      Objective 1: (identify the means of measuring success in meeting the objective): will find one joke per week and share with others      Objective 2: (identify the means of measuring success in meeting the objective): will measure level of depression                 I am currently under the care of a [facility] psychiatric provider: no    My [facility] psychiatric provider is: other physician    I am currently taking psychiatric medications: NA    I feel that I will be ready for discharge from mental health care when I reach the following (measurable goal/objective): I am not in as much pain, and my depression level is low  For children and adults who have a legal guardian:   Has there been any change to custody orders and/or guardianship status? NA  If yes, attach updated documentation  I have created my Crisis Plan and have been offered a copy of this plan    [address]: Diagnosis and Treatment Plan explained to Jostin Wallace acknowledges an understanding of their diagnosis  Samira Duty agrees to this treatment plan      I have been offered a copy of this Treatment Plan  yes

## 2023-01-31 NOTE — PSYCH
Behavioral Health Psychotherapy Progress Note    Psychotherapy Provided: Individual Psychotherapy     1  Bipolar II disorder, most recent episode major depressive ([address] )            Goals addressed in session: Goal 1     DATA:     Had the stimulator in his spine, but it didn't work  So they took it out  Did a bone density test - bones strong  Surgeon is going to put in plates and screws to strengthen the spine at the hip, where the nerves are pinched  His discs are herniated and the degeneration of spine is pinching  Vena Section they put the plate on, it will lift his spine up (L1 and L2) so that it is even  Had shoulder surgery, was in the hospital not that long ago  I can't go on with the pinched nerve - so I am going to talk with the surgeon and go for it  Might like to work as an auto part's   Can't lift anything at all  Still living with son and his wife  Things are going very well  I have 9 grandchildren who visit and he enjoys them  Client spoke about how his carli has been helping him cope with the pain and depression in his life  He is feeling more hopeful now and considering the surgery that has been recommended  He would like to study theology and help others who are also struggling  During this session, this clinician used the following therapeutic modalities: Supportive Psychotherapy    Substance Abuse was not addressed during this session  If the client is diagnosed with a co-occurring substance use disorder, please indicate any changes in the frequency or amount of use: NONE  Stage of change for addressing substance use diagnoses: No substance use/Not applicable    ASSESSMENT:  Ming Avila presents with a Euthymic/ normal and Depressed mood  Now riding the motorized carts at [facility] because if I walk more than 5 minutes before I feel like I am going to fall    Has neuropathy, which "the doctor says will never go away "  The skin over my feet feels like it is burning  That is 24-7  It never goes away  Sleep is poor  xI have jabbing pain in my feet  Might fall asleep at 2 am or 2:30  Able to take very short naps - 20-30 minutes  his affect is Normal range and intensity, which is congruent, with his mood and the content of the session  The client has made progress on their goals  Ruben Tripathi presents with a minimal risk of suicide, minimal risk of self-harm, and minimal risk of harm to others  For any risk assessment that surpasses a "low" rating, a safety plan must be developed  A safety plan was indicated: no  If yes, describe in detail Reach out to family and friends as needed for support    PLAN:   "I would really like to attend the seminary in [place] and I want to read the Bible from cover to cover "      Between sessions, Ruben Tripathi will continue to use learned coping skills, including prayer, listening to the Bible on Audio, finding inspiring saying and sharing them with others, finding the joke of the day to share with others    At the next session, the therapist will use Cognitive Behavioral Therapy to address anxiety, depression and help client learn additional coping skills       Behavioral Health Treatment Plan and Discharge Planning: Ruben Tripathi is aware of and agrees to continue to work on their treatment plan  They have identified and are working toward their discharge goals   yes    Visit start and stop times:    01/31/23  Start Time: 3647  Stop Time: 1645  Total Visit Time: 43 minutes  Virtual Regular Visit    Verification of patient location:    Patient is located in the following state in which I hold an active license [place]      Assessment/Plan:    Problem List Items Addressed This Visit    None  Visit Diagnoses     Bipolar II disorder, [facility]    -  Primary          Goals addressed in session: Goal 1          Reason for visit is   Chief Complaint   Patient presents with   • Virtual Regular Visit Encounter provider Collette Aline, PhD    Provider located at [address] [place] [address] [address]  [address]  [phone]      Recent Visits  No visits were found meeting these conditions  Showing recent visits within past 7 days and meeting all other requirements  Today's Visits  Date Type Provider Dept   01/31/23 Telemedicine Collette Aline, PhD [facility] today's visits and meeting all other requirements  Future Appointments  No visits were found meeting these conditions  Showing future appointments within next 150 days and meeting all other requirements       The patient was identified by name and date of birth  Rashmi Pate was informed that this is a telemedicine visit and that the visit is being conducted throughthe [employer] platform  He agrees to proceed     My office door was closed  No one else was in the room  He acknowledged consent and understanding of privacy and security of the video platform  The patient has agreed to participate and understands they can discontinue the visit at any time  Patient is aware this is a billable service  Subjective  Rashmi Pate is a 74 y o  male was friendly and cooperative in the session         HPI     Past Medical History:   Diagnosis Date   • Anxiety    • Arthritis    • Cancer ([facility] [address] )    • Depression    • Diabetes mellitus ([facility] [address] )    • Hypertension    • Liver disease    • Mitral valve prolapse    • Thyroid disease        Past Surgical History:   Procedure Laterality Date   • INCISION AND DRAINAGE OF WOUND Left 8/12/2022    Procedure: INCISION AND DRAINAGE (I&D) EXTREMITY;  Surgeon: Quang Loera DPM;  Location: [facility] OR;  Service: Podiatry   • JOINT REPLACEMENT Left 03/11/2022    Left TSA   • THYROID SURGERY  2021    remove cancer       Current Outpatient Medications   Medication Sig Dispense Refill   • ACCU-CHEK FABIANO PLUS test strip 4 (four) times a day  1   • ACCU-CHEK FASTCLIX LANCETS MISC 4 (four) times a day  1   • Acetaminophen (TYLENOL EXTRA STRENGTH PO) Take 3 tablets by mouth every 6 (six) hours     • celecoxib (CeleBREX) 200 mg capsule Take 1 PO BID PRN for pain with food and NO other NSAIDS   60 capsule 2   • CHOLECALCIFEROL PO Take 5,000 Units by mouth daily     • ciprofloxacin (CIPRO) 500 mg tablet Take 1 tablet (500 mg total) by mouth every 12 (twelve) hours for 7 days 14 tablet 0   • clonazePAM (KlonoPIN) 1 mg tablet Take 1 mg by mouth 2 (two) times a day     • cyclobenzaprine (FLEXERIL) 10 mg tablet Take 10 mg by mouth daily at bedtime as needed     • folic acid (FOLVITE) 1 mg tablet Take 2,000 mcg by mouth daily  6   • GLOBAL INJECT EASE INSULIN SYR 31G X 5/16" 1 ML MISC 2 (two) times a day  0   • GNP ASPIRIN LOW DOSE 81 MG EC tablet Take 81 mg by mouth daily  0   • hydrOXYzine pamoate (VISTARIL) 25 mg capsule Take 50 mg by mouth daily at bedtime     • ibuprofen (MOTRIN) 400 mg tablet Take 800 mg by mouth 3 (three) times a day not taking (Patient not taking: Reported on 1/17/2023)  1   • Jardiance 10 MG TABS Take 10 mg by mouth daily     • ketoconazole (NIZORAL) 2 % shampoo daily Use as directed  6   • Lantus SoloStar 100 units/mL SOPN Inject 37 mg under the skin 2 (two) times a day with meals     • losartan-hydrochlorothiazide (HYZAAR) 100-25 MG per tablet Take 1 tablet by mouth daily  0   • lovastatin (MEVACOR) 20 mg tablet Take 20 mg by mouth daily  3   • metFORMIN (GLUCOPHAGE) 1000 MG tablet Take 1,000 mg by mouth 2 (two) times a day with meals      • metoprolol succinate (TOPROL-XL) 50 mg 24 hr tablet Take 75 mg by mouth daily     • mirtazapine (REMERON) 45 MG tablet Take 45 mg by mouth daily at bedtime  1   • NIFEdipine (PROCARDIA XL) 30 mg 24 hr tablet TAKE 1 TABLET BY MOUTH DAILY in addition TO 90mg FOR A total OF 120mg DAILY     • NIFEdipine (PROCARDIA XL) 90 mg 24 hr tablet Take 90 mg by mouth daily  3   • sertraline (ZOLOFT) 25 mg tablet Take 100 mg by mouth daily       No current facility-administered medications for this visit  Allergies   Allergen Reactions   • Cephalexin Rash   • Abilify [Aripiprazole] Other (See Comments)     Shaking     • Molds & Smuts    • Pregabalin Other (See Comments)       Review of Systems    Video Exam    There were no vitals filed for this visit      Physical Exam     Visit Time    01/31/23  Start Time: 5613  Stop Time: 4728  Total Visit Time: 43 minutes

## 2023-01-31 NOTE — PATIENT INSTRUCTIONS
Spinal Cord Stimulator Placement   WHAT YOU NEED TO KNOW:   What do I need to know about spinal cord stimulator placement? A spinal cord stimulator (SCS) is a device used to control pain after other treatments have not worked  The SCS delivers a small amount of electrical current to your spinal cord to block pain  SCS placement surgery is done in 2 stages  In the first stage, a temporary SCS is placed and left in for about a week  In the second stage, a permanent SCS is placed if the temporary device reduced your pain  You will get a remote control to turn the pulse generator on and off and adjust the pulses  How do I prepare for surgery? Your surgeon will tell you how to prepare  He or she may tell you not to eat or drink anything after midnight on the day of surgery  Arrange to have someone drive you home when you are discharged from the hospital     Tell your surgeon about all medicines you currently take  Be sure to include any medicine that may cause you to bleed more, such as aspirin or blood thinners  Your surgeon will tell you if you need to stop any of your medicine for the surgery, and when to stop  He or she will tell you which medicines to take or not take on the day of surgery  Tell your surgeon if you have a blood disorder or had a bleeding problem  You may need blood or urine tests to check for infection and make sure your body is okay for surgery  You may also need an MRI to check your spine before your healthcare provider places the SCS  Ask your surgeon for more information about these and other tests you may need  What will happen during surgery? Temporary lead placement:      You may get general anesthesia to keep you asleep during surgery  You may get local anesthesia to numb the surgery area  Local anesthesia allows you to stay awake during the surgery so you can tell your surgeon when your pain decreases  This helps him or her make sure the SCS is in the best spot      Your surgeon will place electrical leads through a small incision or a needle inserted into your back  He or she may need to remove a small piece of bone to insert the leads along your spine  He or she will use an x-ray to make sure the leads are in the correct place  The incision will be covered with bandages  The leads will be connected to wires and attached to a pulse generator placed outside your body  Permanent lead placement:      Your surgeon will remove the temporary lead and replace it with a new, permanent lead through an incision or needle in your back  He or she will make another incision in your abdomen or buttocks  Then he or she will make a small pocket under your skin and place the SCS  Wires will be attached to the SCS  The wires will be tunneled under your skin  Your surgeon will connect the wires to the leads placed in your spine  The incisions will be closed with stitches and covered with a bandage  What should I expect after surgery? You will be taken to a room to rest until you are fully awake  Healthcare providers will monitor you closely for any problems  Do not get out of bed until your healthcare provider says it is okay  SCS settings  on the remote control can be changed to help relieve your pain  Your healthcare provider will show you how to adjust the settings  Medicines  may be given for surgery pain or to prevent a bacterial infection  Ice packs  may be used to decrease the pain from the incisions  What are the risks of spinal cord stimulator placement? The spinal cord stimulator may not work correctly and you may need to have it replaced  You may develop a headache, or your pain may get worse  Surgery may cause you to bleed or to leak spinal fluid  After surgery, you may get an infection at the incision site  You may also get a serious infection near where the leads are placed  This may cause paralysis or become life-threatening    CARE AGREEMENT:   You have the right to help plan your care  Learn about your health condition and how it may be treated  Discuss treatment options with your healthcare providers to decide what care you want to receive  You always have the right to refuse treatment  The above information is an  only  It is not intended as medical advice for individual conditions or treatments  Talk to your doctor, nurse or pharmacist before following any medical regimen to see if it is safe and effective for you  © Copyright [employer] [facility] 2022 Information is for End User's use only and may not be sold, redistributed or otherwise used for commercial purposes   All illustrations and images included in CareNotes® are the copyrighted property of [employer]  or [address] [place] [address] [employer]

## 2023-01-31 NOTE — PROGRESS NOTES
Assessment:  1  Chronic pain syndrome    2  Chronic bilateral low back pain without sciatica    3  Lumbar radiculopathy    4  Lumbar spondylosis    5  Herniated nucleus pulposus of lumbosacral region    6  Spinal stenosis of lumbar region with neurogenic claudication        Plan:  While the patient was in the office today the temporary stimulator lead was appropriately detached and removed from the insertion site  The lead was completely intact and was pulled without any complications, issues, and/or signs or symptoms of infection  We also thoroughly reviewed the signs or symptoms of infection and advised the patient to give our office a call if they would experience any signs or symptoms of infection  I advised the patient to finish out the antibiotics as prescribed and until they are completely gone  I did advise to take the antibiotics with food and recommended yogurt daily to help prevent gastrointestinal irritation  The patient was agreeable and verbalized an understanding  The lead insertion site was cleaned with alcohol and covered with a dry bandage  I advised the patient that they could shower tomorrow, but that they were not to submerse into water until the lead insertion site is completely healed in approximately 7-10 days  I also advised the patient to keep the lead insertion site covered with a dry bandage until the site is completely healed and intact  The patient verbalized an understanding  At this point in time I advised the patient that he should follow-up with his office visit as scheduled tomorrow with Dr Kimo Graves and then depending from there if he is a surgical candidate and he decides to proceed with surgery, for now, then we will hold off and wait to see how he does and be here as needed    However, if it is deemed that he is not a surgical candidate, he can call our office if he would like to to schedule a follow-up appointment and at that point we will reevaluate any other medication treatment options to see what we can do to try to better manage his pain and provided improvement in his activities of daily living and overall quality of life if possible  The patient and his wife are agreeable and verbalized understanding  I discussed with the patient that at this point time he can follow up with our office on an as-needed basis  I did review the patient that if his pain symptoms should change, worsen, and/or if he would experience any new symptoms he would like to be evaluated for, he should give our office a call  The patient was agreeable and verbalized an understanding  History of Present Illness: The patient is a 74 y o  male last seen on 1/26/2022 who presents for a follow up office visit in regards to chronic pain syndrome, as the patient's pain has been ongoing for greater than a year, secondary to herniated lumbar disks with spondylosis, stenosis and radiculopathy  The patient currently reports that at this point as the patient presents today for his thoracic lead [employer] spinal cord stimulator trial lead removal, that unfortunately even though he does feel there is some improvement in his lower extremity radicular symptoms, numbness and tingling as a result of the stimulator, he finds the stimulation uncomfortable and has not noted any kind of improvement in his bilateral hip or back pain  The patient reports that at this point he does not feel that the stimulator trial was successful at all  The patient reports that he is taking the oral antibiotics as prescribed and currently denies any signs or symptoms of infection  The patient is scheduled for a follow-up office visit tomorrow with Dr Won Richardson for reevaluation of his back pain and radicular symptoms after his bone scan to see if he is a candidate for surgical options, possibly even a fusion    The patient presents today for his stimulator trial lead removal and to discuss treatment options as he is wondering if there is anything else we can prescribe as he has tried and failed so many different medication options to manage his pain  Current pain medications includes: Celebrex 200 mg twice daily as needed for pain  The patient reports that this regimen is providing minimal pain relief  The patient is reporting no side effects from this pain medication regimen  I have personally reviewed and/or updated the patient's past medical history, past surgical history, family history, social history, current medications, allergies, and vital signs today  Review of Systems:    Review of Systems   Respiratory: Negative for shortness of breath  Cardiovascular: Negative for chest pain  Gastrointestinal: Positive for constipation  Negative for diarrhea, nausea and vomiting  Musculoskeletal: Positive for gait problem  Negative for arthralgias, joint swelling (Joint stiffness) and myalgias  Skin: Negative for rash  Neurological: Positive for weakness  Negative for dizziness and seizures  All other systems reviewed and are negative  Past Medical History:   Diagnosis Date   • Anxiety    • Arthritis    • Cancer ([facility] )    • Depression    • Diabetes mellitus ([facility] )    • Hypertension    • Liver disease    • Mitral valve prolapse    • Thyroid disease        Past Surgical History:   Procedure Laterality Date   • INCISION AND DRAINAGE OF WOUND Left 2022    Procedure: INCISION AND DRAINAGE (I&D) EXTREMITY;  Surgeon: Tate Hill DPM;  Location: [facility] OR;  Service: Podiatry   • JOINT REPLACEMENT Left 2022    Left TSA   • THYROID SURGERY      remove cancer       History reviewed  No pertinent family history      Social History     Occupational History   • Not on file   Tobacco Use   • Smoking status: Former     Packs/day: 0 25     Types: Cigarettes     Quit date:      Years since quittin 1   • Smokeless tobacco: Never   • Tobacco comments:     quit    Vaping Use   • Vaping Use: Never used Substance and Sexual Activity   • Alcohol use: Yes     Comment: occasional, social   • Drug use: Not Currently     Types: Marijuana   • Sexual activity: Not Currently         Current Outpatient Medications:   •  Acetaminophen (TYLENOL EXTRA STRENGTH PO), Take 3 tablets by mouth every 6 (six) hours, Disp: , Rfl:   •  celecoxib (CeleBREX) 200 mg capsule, Take 1 PO BID PRN for pain with food and NO other NSAIDS , Disp: 60 capsule, Rfl: 2  •  CHOLECALCIFEROL PO, Take 5,000 Units by mouth daily, Disp: , Rfl:   •  ciprofloxacin (CIPRO) 500 mg tablet, Take 1 tablet (500 mg total) by mouth every 12 (twelve) hours for 7 days, Disp: 14 tablet, Rfl: 0  •  clonazePAM (KlonoPIN) 1 mg tablet, Take 1 mg by mouth 2 (two) times a day, Disp: , Rfl:   •  cyclobenzaprine (FLEXERIL) 10 mg tablet, Take 10 mg by mouth daily at bedtime as needed, Disp: , Rfl:   •  folic acid (FOLVITE) 1 mg tablet, Take 2,000 mcg by mouth daily, Disp: , Rfl: 6  •  GNP ASPIRIN LOW DOSE 81 MG EC tablet, Take 81 mg by mouth daily, Disp: , Rfl: 0  •  hydrOXYzine pamoate (VISTARIL) 25 mg capsule, Take 50 mg by mouth daily at bedtime, Disp: , Rfl:   •  Jardiance 10 MG TABS, Take 10 mg by mouth daily, Disp: , Rfl:   •  ketoconazole (NIZORAL) 2 % shampoo, daily Use as directed, Disp: , Rfl: 6  •  Lantus SoloStar 100 units/mL SOPN, Inject 37 mg under the skin 2 (two) times a day with meals, Disp: , Rfl:   •  losartan-hydrochlorothiazide (HYZAAR) 100-25 MG per tablet, Take 1 tablet by mouth daily, Disp: , Rfl: 0  •  lovastatin (MEVACOR) 20 mg tablet, Take 20 mg by mouth daily, Disp: , Rfl: 3  •  metFORMIN (GLUCOPHAGE) 1000 MG tablet, Take 1,000 mg by mouth 2 (two) times a day with meals , Disp: , Rfl:   •  metoprolol succinate (TOPROL-XL) 50 mg 24 hr tablet, Take 75 mg by mouth daily, Disp: , Rfl:   •  mirtazapine (REMERON) 45 MG tablet, Take 45 mg by mouth daily at bedtime, Disp: , Rfl: 1  •  NIFEdipine (PROCARDIA XL) 30 mg 24 hr tablet, TAKE 1 TABLET BY MOUTH DAILY in addition TO 90mg FOR A total OF 120mg DAILY, Disp: , Rfl:   •  NIFEdipine (PROCARDIA XL) 90 mg 24 hr tablet, Take 90 mg by mouth daily, Disp: , Rfl: 3  •  sertraline (ZOLOFT) 25 mg tablet, Take 100 mg by mouth daily, Disp: , Rfl:   •  ACCU-CHEK FABIANO PLUS test strip, 4 (four) times a day, Disp: , Rfl: 1  •  ACCU-CHEK FASTCLIX LANCETS MISC, 4 (four) times a day, Disp: , Rfl: 1  •  GLOBAL INJECT EASE INSULIN SYR 31G X 5/16" 1 ML MISC, 2 (two) times a day, Disp: , Rfl: 0  •  ibuprofen (MOTRIN) 400 mg tablet, Take 800 mg by mouth 3 (three) times a day not taking (Patient not taking: Reported on 1/17/2023), Disp: , Rfl: 1    Allergies   Allergen Reactions   • Cephalexin Rash   • Abilify [Aripiprazole] Other (See Comments)     Shaking     • Molds & Smuts    • Pregabalin Other (See Comments)       Physical Exam:    /88 (BP Location: Left arm, Patient Position: Sitting, Cuff Size: Standard)   Pulse 99   Temp 97 9 °F (36 6 °C)   Ht 5' 10" (1 778 m)   BMI 27 41 kg/m²     Constitutional:normal, well developed, well nourished, alert, in no distress and non-toxic and no overt pain behavior  Eyes:anicteric  HEENT:grossly intact  Neck:supple, symmetric, trachea midline and no masses   Pulmonary:even and unlabored  Cardiovascular:No edema or pitting edema present  Skin:Normal without rashes or lesions and well hydrated  Psychiatric:Mood and affect appropriate  Neurologic:Cranial Nerves II-XII grossly intact  Musculoskeletal:The patient's gait is antalgic, limping at times, painful, but steady with the use of a single cane  The patient's thoracic lead spinal cord stimulator insertion site was clean, dry, intact, without any signs or symptoms of erythema, edema and or infection  Imaging  No orders to display         No orders of the defined types were placed in this encounter

## 2023-01-31 NOTE — TELEPHONE ENCOUNTER
Faxed another request for images to be pushed through from patients Dexa scan completed at [facility]

## 2023-02-01 ENCOUNTER — OFFICE VISIT (OUTPATIENT)
Dept: NEUROSURGERY | Facility: CLINIC | Age: 68
End: 2023-02-01

## 2023-02-01 VITALS
HEIGHT: 70 IN | BODY MASS INDEX: 27.92 KG/M2 | SYSTOLIC BLOOD PRESSURE: 142 MMHG | OXYGEN SATURATION: 99 % | HEART RATE: 101 BPM | DIASTOLIC BLOOD PRESSURE: 88 MMHG | WEIGHT: 195 LBS | TEMPERATURE: 98 F

## 2023-02-01 DIAGNOSIS — F10.21 ALCOHOLISM IN REMISSION (HCC): ICD-10-CM

## 2023-02-01 DIAGNOSIS — M47.816 LUMBAR SPONDYLOSIS: ICD-10-CM

## 2023-02-01 DIAGNOSIS — M54.16 LUMBAR RADICULOPATHY: ICD-10-CM

## 2023-02-01 DIAGNOSIS — M48.062 SPINAL STENOSIS OF LUMBAR REGION WITH NEUROGENIC CLAUDICATION: Primary | ICD-10-CM

## 2023-02-01 DIAGNOSIS — E11.42 DIABETIC PERIPHERAL NEUROPATHY (HCC): ICD-10-CM

## 2023-02-01 PROBLEM — L97.529 DIABETIC ULCER OF LEFT FOOT ASSOCIATED WITH TYPE 2 DIABETES MELLITUS (HCC): Status: RESOLVED | Noted: 2022-08-09 | Resolved: 2023-02-01

## 2023-02-01 PROBLEM — E11.621 DIABETIC ULCER OF LEFT FOOT ASSOCIATED WITH TYPE 2 DIABETES MELLITUS (HCC): Status: RESOLVED | Noted: 2022-08-09 | Resolved: 2023-02-01

## 2023-02-01 RX ORDER — VANCOMYCIN HYDROCHLORIDE 1 G/200ML
1000 INJECTION, SOLUTION INTRAVENOUS ONCE
OUTPATIENT
Start: 2023-02-01 | End: 2023-02-01

## 2023-02-01 RX ORDER — CHLORHEXIDINE GLUCONATE 0.12 MG/ML
15 RINSE ORAL ONCE
OUTPATIENT
Start: 2023-02-01 | End: 2023-02-01

## 2023-02-01 RX ORDER — IPRATROPIUM BROMIDE 42 UG/1
SPRAY, METERED NASAL 3 TIMES DAILY
COMMUNITY
Start: 2022-06-29

## 2023-02-01 RX ORDER — ACETAMINOPHEN 325 MG/1
975 TABLET ORAL ONCE
OUTPATIENT
Start: 2023-02-01 | End: 2023-02-01

## 2023-02-01 RX ORDER — GABAPENTIN 300 MG/1
300 CAPSULE ORAL ONCE
OUTPATIENT
Start: 2023-02-01 | End: 2023-02-01

## 2023-02-01 NOTE — PROGRESS NOTES
Office Note - Neurosurgery   Jalen Ashford 74 y o  male MRN: [id number]      Assessment:    Patient is stable  56-year-old gentleman with lower back pain and radiculopathy secondary lumbar spondylosis and stenosis  He underwent spinal cord stimulator trial, and while this improves some the burning neuropathic pain in his feet, did not adjust his lower back pain and pain radiating into both hips and into the back of his legs  We discussed an L1-S1 posterior decompression with navigated instrumented fixation fusion and possible extension to additional levels  The goal of surgery is to relieve pressure neural structures and hopefully improve radicular pain and symptoms of neurogenic claudication  Weakness, numbness and back pain are less likely to improve  The risks of surgery were described in detail  1  Risk of general anesthetic with possible cardiac and respiratory complication  Risk of infection and bleeding  2  Risk of neurological injury with new pain, weakness or numbness in the legs or difficulties with bowel and bladder function  Risk of CSF leak was described  3  Possible need for additional lumbar spine surgery in the future  Expected postoperative course, including activity restrictions, expected pain and postoperative medication were reviewed  Patient provided verbal consent to surgical procedure and signed consent form: Yes  He will require medical clearance  He understands that diabetes places him at high risk of wound healing issues and infection  He also understands that surgery will not help his peripheral neuropathy  History, physical examination and diagnostic tests were reviewed and questions answered  Diagnosis, care plan and treatment options were discussed  The patient and spouse/SO understand instructions and will follow up as directed      Plan:    Follow-up: Surgery    Problem List Items Addressed This Visit        Endocrine    Diabetic peripheral neuropathy ([facility] ) Relevant Orders    UA w Reflex to Microscopic w Reflex to Culture    Type and screen    Comprehensive metabolic panel    CBC and differential    APTT    Protime-INR    HEMOGLOBIN A1C W/ EAG ESTIMATION    MRSA culture    EKG 12 lead       Nervous and Auditory    Lumbar radiculopathy    Relevant Orders    Case request operating room: L1-S1 navigated posterior decompression with instrumented fixation fusion and possible extension to additional levels (Completed)    Ambulatory referral to Family Practice    UA w Reflex to Microscopic w Reflex to Culture    Type and screen    Comprehensive metabolic panel    CBC and differential    APTT    Protime-INR    HEMOGLOBIN A1C W/ EAG ESTIMATION    MRSA culture    EKG 12 lead       Musculoskeletal and Integument    Lumbar spondylosis    Relevant Orders    Case request operating room: L1-S1 navigated posterior decompression with instrumented fixation fusion and possible extension to additional levels (Completed)    Ambulatory referral to [facility]    UA w Reflex to Microscopic w Reflex to Culture    Type and screen    Comprehensive metabolic panel    CBC and differential    APTT    Protime-INR    HEMOGLOBIN A1C W/ EAG ESTIMATION    MRSA culture    EKG 12 lead       Other    Alcoholism in remission ([facility] )    Relevant Orders    UA w Reflex to Microscopic w Reflex to Culture    Type and screen    Comprehensive metabolic panel    CBC and differential    APTT    Protime-INR    HEMOGLOBIN A1C W/ EAG ESTIMATION    MRSA culture    EKG 12 lead    Spinal stenosis of lumbar region with neurogenic claudication - Primary    Relevant Orders    Case request operating room: L1-S1 navigated posterior decompression with instrumented fixation fusion and possible extension to additional levels (Completed)    Ambulatory referral to [facility]    UA w Reflex to Microscopic w Reflex to Culture    Type and screen    Comprehensive metabolic panel    CBC and differential    APTT    Protime-INR HEMOGLOBIN A1C W/ EAG ESTIMATION    MRSA culture    EKG 12 lead       Subjective/Objective     Chief Complaint    Lower back pain rating into hips and legs  HPI    77-year-old gentleman being seen in follow-up  He has known lumbar spondylosis and stenosis in addition to diabetic neuropathy  He underwent spinal cord stimulator trial and while it did help his peripheral neuropathy in his feet, it was not helpful with his low back pain rating into both hips and down into his hamstrings  He denies any changes in bowel or bladder function  He has tried a number of nonsurgical pain and strategies limited improvement in his symptoms  He is interested in discussing surgical intervention  EDITH SHARMA personally reviewed and updated  Review of Systems   Constitutional: Positive for appetite change (getting better)  HENT: Negative  Eyes: Negative  Respiratory: Negative  Cardiovascular: Negative  Gastrointestinal: Negative  Some pain with BM, hurts around coccyx to sit   Endocrine: Negative  Genitourinary: Negative  Occasional urinary hesitacy   Musculoskeletal: Positive for back pain (constant low back pain, radiates up spine and wraps around to front  Occasionally radiates to buttocks and sometimes posterior legs), gait problem (using cane, no recent falls ), myalgias (muscle pain/tightness/spams in back ) and neck pain (sometimes feels a little pain )  SCS trial removed yesterday- helped some with neuropathy but not for back pain   Skin: Negative  Allergic/Immunologic: Negative  Neurological: Positive for weakness (sometimes in BLE, L>R) and numbness (sometimes in his feet from neuropathy  and sometimes in his arms )  Hematological: Negative  Psychiatric/Behavioral: Positive for sleep disturbance (pain keeping him up at night)  All other systems reviewed and are negative  Family History    History reviewed  No pertinent family history      Social History    Social History     Socioeconomic History   • Marital status: /Civil Union     Spouse name: Not on file   • Number of children: Not on file   • Years of education: Not on file   • Highest education level: Not on file   Occupational History   • Not on file   Tobacco Use   • Smoking status: Former     Packs/day: 0 25     Types: Cigarettes     Quit date: 0     Years since quittin 1   • Smokeless tobacco: Never   • Tobacco comments:     quit 1981   Vaping Use   • Vaping Use: Never used   Substance and Sexual Activity   • Alcohol use: Yes     Comment: occasional, social   • Drug use: Not Currently     Types: Marijuana   • Sexual activity: Not Currently   Other Topics Concern   • Not on file   Social History Narrative   • Not on file     Social Determinants of Health     Financial Resource Strain: Not on file   Food Insecurity: No Food Insecurity   • Worried About Running Out of Food in the Last Year: Never true   • Ran Out of Food in the Last Year: Never true   Transportation Needs: No Transportation Needs   • Lack of Transportation (Medical): No   • Lack of Transportation (Non-Medical):  No   Physical Activity: Not on file   Stress: Not on file   Social Connections: Not on file   Intimate Partner Violence: Not on file   Housing Stability: Low Risk    • Unable to Pay for Housing in the Last Year: No   • Number of Places Lived in the Last Year: 1   • Unstable Housing in the Last Year: No       Past Medical History    Past Medical History:   Diagnosis Date   • Anxiety    • Arthritis    • Cancer ([facility] )    • Depression    • Diabetes mellitus ([facility] )    • Hypertension    • Liver disease    • Mitral valve prolapse    • Thyroid disease        Surgical History    Past Surgical History:   Procedure Laterality Date   • INCISION AND DRAINAGE OF WOUND Left 2022    Procedure: INCISION AND DRAINAGE (I&D) EXTREMITY;  Surgeon: Linnea Dorado DPM;  Location: [facility] OR;  Service: Podiatry   • JOINT REPLACEMENT Left 03/11/2022    Left TSA   • THYROID SURGERY  2021    remove cancer       Medications      Current Outpatient Medications:   •  ACCU-CHEK FABIANO PLUS test strip, 4 (four) times a day, Disp: , Rfl: 1  •  ACCU-CHEK FASTCLIX LANCETS MISC, 4 (four) times a day, Disp: , Rfl: 1  •  Acetaminophen (TYLENOL EXTRA STRENGTH PO), Take 3 tablets by mouth every 6 (six) hours, Disp: , Rfl:   •  celecoxib (CeleBREX) 200 mg capsule, Take 1 PO BID PRN for pain with food and NO other NSAIDS , Disp: 60 capsule, Rfl: 2  •  CHOLECALCIFEROL PO, Take 5,000 Units by mouth daily, Disp: , Rfl:   •  clonazePAM (KlonoPIN) 1 mg tablet, Take 1 mg by mouth 2 (two) times a day, Disp: , Rfl:   •  cyclobenzaprine (FLEXERIL) 10 mg tablet, Take 10 mg by mouth daily at bedtime as needed, Disp: , Rfl:   •  folic acid (FOLVITE) 1 mg tablet, Take 2,000 mcg by mouth daily, Disp: , Rfl: 6  •  GLOBAL INJECT EASE INSULIN SYR 31G X 5/16" 1 ML MISC, 2 (two) times a day, Disp: , Rfl: 0  •  GNP ASPIRIN LOW DOSE 81 MG EC tablet, Take 81 mg by mouth daily, Disp: , Rfl: 0  •  hydrOXYzine pamoate (VISTARIL) 25 mg capsule, Take 50 mg by mouth daily at bedtime, Disp: , Rfl:   •  ipratropium (ATROVENT) 0 06 % nasal spray, 3 (three) times a day, Disp: , Rfl:   •  Jardiance 10 MG TABS, Take 10 mg by mouth daily, Disp: , Rfl:   •  ketoconazole (NIZORAL) 2 % shampoo, daily Use as directed, Disp: , Rfl: 6  •  Lantus SoloStar 100 units/mL SOPN, Inject 37 mg under the skin 2 (two) times a day with meals, Disp: , Rfl:   •  losartan-hydrochlorothiazide (HYZAAR) 100-25 MG per tablet, Take 1 tablet by mouth daily, Disp: , Rfl: 0  •  lovastatin (MEVACOR) 20 mg tablet, Take 20 mg by mouth daily, Disp: , Rfl: 3  •  metFORMIN (GLUCOPHAGE) 1000 MG tablet, Take 1,000 mg by mouth 2 (two) times a day with meals , Disp: , Rfl:   •  metoprolol succinate (TOPROL-XL) 50 mg 24 hr tablet, Take 75 mg by mouth daily, Disp: , Rfl:   •  mirtazapine (REMERON) 45 MG tablet, Take 45 mg by mouth daily at bedtime, Disp: , Rfl: 1  •  NIFEdipine (PROCARDIA XL) 30 mg 24 hr tablet, TAKE 1 TABLET BY MOUTH DAILY in addition TO 90mg FOR A total OF 120mg DAILY, Disp: , Rfl:   •  NIFEdipine (PROCARDIA XL) 90 mg 24 hr tablet, Take 90 mg by mouth daily, Disp: , Rfl: 3  •  sertraline (ZOLOFT) 25 mg tablet, Take 100 mg by mouth daily, Disp: , Rfl:     Allergies    Allergies   Allergen Reactions   • Cephalexin Rash   • Abilify [Aripiprazole] Other (See Comments)     Shaking     • Molds & Smuts    • Pregabalin Other (See Comments)       The following portions of the patient's history were reviewed and updated as appropriate: allergies, current medications, past family history, past medical history, past social history, past surgical history and problem list     Investigations    I personally reviewed the MRI results with the patient:    MRI of the lumbar spine without contrast dated December 2, 2022  Report personally reviewed  Straightening of lumbar lordosis  Degenerative changes throughout the lumbar spine  Advanced generative changes at L1-2 with biforaminal stenosis and advanced Modic changes in the endplates  At L2-3 there is moderate stenosis secondary degenerative changes  At L3-4 there is milder stenosis  At L4-5 there is severe central and bilateral lateral recess stenosis secondary degenerative changes  L5-S1 there is biforaminal stenosis secondary degenerative changes  Intrathecal contents unremarkable  Visualized caudal aspect of the thoracic spinal cord is unremarkable  Physical Exam    Vitals:  Blood pressure 142/88, pulse 101, temperature 98 °F (36 7 °C), temperature source Temporal, height 5' 10" (1 778 m), weight 88 5 kg (195 lb), SpO2 99 %  ,Body mass index is 27 98 kg/m²  Physical Exam  Vitals reviewed  Constitutional:       General: He is not in acute distress  Eyes:      Extraocular Movements: Extraocular movements intact     Cardiovascular:      Rate and Rhythm: Normal rate and regular rhythm  Pulmonary:      Effort: Pulmonary effort is normal  No respiratory distress  Musculoskeletal:         General: No deformity  Skin:     General: Skin is warm and dry  Neurological:      Mental Status: He is alert and oriented to person, place, and time  Sensory: Sensory deficit present  Motor: No weakness  Gait: Gait abnormal    Psychiatric:         Mood and Affect: Mood normal          Behavior: Behavior normal        Neurologic Exam     Mental Status   Oriented to person, place, and time

## 2023-02-05 ENCOUNTER — HOSPITAL ENCOUNTER (EMERGENCY)
Facility: HOSPITAL | Age: 68
Discharge: HOME | End: 2023-02-05
Attending: EMERGENCY MEDICINE

## 2023-02-05 VITALS
DIASTOLIC BLOOD PRESSURE: 57 MMHG | WEIGHT: 201 LBS | SYSTOLIC BLOOD PRESSURE: 104 MMHG | RESPIRATION RATE: 18 BRPM | BODY MASS INDEX: 28.84 KG/M2 | OXYGEN SATURATION: 95 % | HEART RATE: 98 BPM | TEMPERATURE: 97 F

## 2023-02-05 DIAGNOSIS — M54.41 CHRONIC BILATERAL LOW BACK PAIN WITH BILATERAL SCIATICA: Primary | ICD-10-CM

## 2023-02-05 DIAGNOSIS — G89.29 CHRONIC BILATERAL LOW BACK PAIN WITH BILATERAL SCIATICA: Primary | ICD-10-CM

## 2023-02-05 DIAGNOSIS — M54.16 LUMBAR RADICULOPATHY: ICD-10-CM

## 2023-02-05 DIAGNOSIS — M54.41 ACUTE BILATERAL LOW BACK PAIN WITH BILATERAL SCIATICA: ICD-10-CM

## 2023-02-05 DIAGNOSIS — M54.42 CHRONIC BILATERAL LOW BACK PAIN WITH BILATERAL SCIATICA: Primary | ICD-10-CM

## 2023-02-05 DIAGNOSIS — M54.42 ACUTE BILATERAL LOW BACK PAIN WITH BILATERAL SCIATICA: ICD-10-CM

## 2023-02-05 RX ORDER — PREDNISONE 20 MG/1
40 TABLET ORAL ONCE
Status: COMPLETED | OUTPATIENT
Start: 2023-02-05 | End: 2023-02-05

## 2023-02-05 RX ORDER — KETOROLAC TROMETHAMINE 30 MG/ML
30 INJECTION, SOLUTION INTRAMUSCULAR; INTRAVENOUS ONCE
Status: COMPLETED | OUTPATIENT
Start: 2023-02-05 | End: 2023-02-05

## 2023-02-05 RX ORDER — PREDNISONE 20 MG/1
40 TABLET ORAL DAILY
Qty: 10 TABLET | Refills: 0 | Status: SHIPPED | OUTPATIENT
Start: 2023-02-05 | End: 2023-02-10

## 2023-02-05 RX ADMIN — PREDNISONE 40 MG: 20 TABLET ORAL at 16:52

## 2023-02-05 RX ADMIN — KETOROLAC TROMETHAMINE 30 MG: 30 INJECTION, SOLUTION INTRAMUSCULAR; INTRAVENOUS at 16:52

## 2023-02-05 NOTE — ED PROVIDER NOTES
History  Chief Complaint   Patient presents with   • Back Pain     Patient complaint of sever low back pain " scheduled for surgery , none of the medications are working"     75 y/o M w/ chronic LBP w/ severe DJD and spondylosis / Spinal stenosis with chronic lumbar radiculopathy and prior spinal stimulator ongoing and persistent and now worsening  DM 2 w/ peripheral neuropathy / HTN/HLD / COPD MDAD  He has followed up with Dr Selwyn Bernal of [address] [place] [address] and recently consulted for possible surgical intervention w/ upcoming scheduled laminectomy on  w/ Dr Selwyn Bernal  Patient represents to ED for worsening pain over the last 24 hrs and reports his celebrex is no longer working  He states prednisone usually improves his LBP and is requesting treatment w/ toradol and steroid  No complaints of saddle anesthesia, fever, bowel or bladder incontinence or lower extremity weakness  Prior to Admission Medications   Prescriptions Last Dose Informant Patient Reported? Taking?    ACCU-CHEK FABIANO PLUS test strip   Yes No   Si (four) times a day   ACCU-CHEK FASTCLIX LANCETS MISC   Yes No   Si (four) times a day   Acetaminophen (TYLENOL EXTRA STRENGTH PO)   Yes No   Sig: Take 3 tablets by mouth every 6 (six) hours   CHOLECALCIFEROL PO   Yes No   Sig: Take 5,000 Units by mouth daily   GLOBAL INJECT EASE INSULIN SYR 31G X 5/16" 1 ML MISC   Yes No   Si (two) times a day   GNP ASPIRIN LOW DOSE 81 MG EC tablet   Yes No   Sig: Take 81 mg by mouth daily   Jardiance 10 MG TABS   Yes No   Sig: Take 10 mg by mouth daily   Lantus SoloStar 100 units/mL SOPN   Yes No   Sig: Inject 37 mg under the skin 2 (two) times a day with meals   NIFEdipine (PROCARDIA XL) 30 mg 24 hr tablet   Yes No   Sig: TAKE 1 TABLET BY MOUTH DAILY in addition TO 90mg FOR A total OF 120mg DAILY   NIFEdipine (PROCARDIA XL) 90 mg 24 hr tablet   Yes No   Sig: Take 90 mg by mouth daily   celecoxib (CeleBREX) 200 mg capsule   No No   Sig: Take 1 PO BID PRN for pain with food and NO other NSAIDS  clonazePAM (KlonoPIN) 1 mg tablet   Yes No   Sig: Take 1 mg by mouth 2 (two) times a day   cyclobenzaprine (FLEXERIL) 10 mg tablet   Yes No   Sig: Take 10 mg by mouth daily at bedtime as needed   folic acid (FOLVITE) 1 mg tablet   Yes No   Sig: Take 2,000 mcg by mouth daily   hydrOXYzine pamoate (VISTARIL) 25 mg capsule   Yes No   Sig: Take 50 mg by mouth daily at bedtime   ipratropium (ATROVENT) 0 06 % nasal spray   Yes No   Sig: 3 (three) times a day   ketoconazole (NIZORAL) 2 % shampoo   Yes No   Sig: daily Use as directed   losartan-hydrochlorothiazide (HYZAAR) 100-25 MG per tablet   Yes No   Sig: Take 1 tablet by mouth daily   lovastatin (MEVACOR) 20 mg tablet   Yes No   Sig: Take 20 mg by mouth daily   metFORMIN (GLUCOPHAGE) 1000 MG tablet   Yes No   Sig: Take 1,000 mg by mouth 2 (two) times a day with meals    metoprolol succinate (TOPROL-XL) 50 mg 24 hr tablet   Yes No   Sig: Take 75 mg by mouth daily   mirtazapine (REMERON) 45 MG tablet   Yes No   Sig: Take 45 mg by mouth daily at bedtime   sertraline (ZOLOFT) 25 mg tablet   Yes No   Sig: Take 100 mg by mouth daily      Facility-Administered Medications: None       Past Medical History:   Diagnosis Date   • Anxiety    • Arthritis    • Cancer (HCC)    • Depression    • Diabetes mellitus (HCC)    • Hypertension    • Liver disease    • Mitral valve prolapse    • Thyroid disease        Past Surgical History:   Procedure Laterality Date   • INCISION AND DRAINAGE OF WOUND Left 8/12/2022    Procedure: INCISION AND DRAINAGE (I&D) EXTREMITY;  Surgeon: Vivek Santana DPM;  Location:  MAIN OR;  Service: Podiatry   • JOINT REPLACEMENT Left 03/11/2022    Left TSA   • THYROID SURGERY  2021    remove cancer       No family history on file  I have reviewed and agree with the history as documented      E-Cigarette/Vaping   • E-Cigarette Use Never User      E-Cigarette/Vaping Substances     Social History     Tobacco Use   • Smoking status: Former     Packs/day: 0 25     Types: Cigarettes     Quit date:      Years since quittin 1   • Smokeless tobacco: Never   • Tobacco comments:     quit    Vaping Use   • Vaping Use: Never used   Substance Use Topics   • Alcohol use: Yes     Comment: occasional, social   • Drug use: Not Currently     Types: Marijuana       Review of Systems   Constitutional: Negative for chills and fever  HENT: Negative for ear pain and sore throat  Eyes: Negative for pain and visual disturbance  Respiratory: Negative for cough and shortness of breath  Cardiovascular: Negative for chest pain and palpitations  Gastrointestinal: Negative for abdominal pain and vomiting  Genitourinary: Negative for dysuria and hematuria  Musculoskeletal: Positive for back pain  Negative for arthralgias  Skin: Negative for color change and rash  Neurological: Negative for seizures and syncope  All other systems reviewed and are negative  Physical Exam  Physical Exam  Vitals and nursing note reviewed  Constitutional:       General: He is not in acute distress  Appearance: He is well-developed  HENT:      Head: Normocephalic and atraumatic  Eyes:      Conjunctiva/sclera: Conjunctivae normal    Cardiovascular:      Rate and Rhythm: Normal rate and regular rhythm  Heart sounds: No murmur heard  Pulmonary:      Effort: Pulmonary effort is normal  No respiratory distress  Breath sounds: Normal breath sounds  Abdominal:      Palpations: Abdomen is soft  Tenderness: There is no abdominal tenderness  Musculoskeletal:         General: No swelling  Cervical back: Neck supple  Thoracic back: No spasms  Normal range of motion  No scoliosis  Lumbar back: No spasms, tenderness or bony tenderness  Positive right straight leg raise test and positive left straight leg raise test    Skin:     General: Skin is warm and dry        Capillary Refill: Capillary refill takes less than 2 seconds  Neurological:      Mental Status: He is alert and oriented to person, place, and time  GCS: GCS eye subscore is 4  GCS verbal subscore is 5  GCS motor subscore is 6  Motor: No weakness  Gait: Gait abnormal (walks with cane due to pain)  Deep Tendon Reflexes:      Reflex Scores:       Patellar reflexes are 2+ on the right side and 2+ on the left side  Psychiatric:         Mood and Affect: Mood normal          Vital Signs  ED Triage Vitals [02/05/23 1607]   Temperature Pulse Respirations Blood Pressure SpO2   (!) 97 °F (36 1 °C) 98 18 104/57 95 %      Temp src Heart Rate Source Patient Position - Orthostatic VS BP Location FiO2 (%)   -- -- -- -- --      Pain Score       10 - Worst Possible Pain           Vitals:    02/05/23 1607   BP: 104/57   Pulse: 98         Visual Acuity      ED Medications  Medications   ketorolac (TORADOL) injection 30 mg (30 mg Intramuscular Given 2/5/23 1652)   predniSONE tablet 40 mg (40 mg Oral Given 2/5/23 1652)       Diagnostic Studies  Results Reviewed     None                 No orders to display              Procedures  Procedures         ED Course                Stable ED course  Advised of all findings and testing including abnormalities and defined need for follow up as/where indicated  Medical Decision Making  No red flag back pain symptoms for abscess, trauma, neurological weakness, and no weight loss  Discussed case w/ Dr Sharif Meyer via tiger text to ensure would be ok to give steroids in consideration he is preoperative  He states that he is far enough out that treatment with steroids at this juncture would not significantly effect the operation and would otherwise be safe to use as long as the patient is able to manage his blood sugar at home  DIscussed RTED sx for worsening pain / neuro symptoms or weakness, fevers, or incontinence    Pt agreeable to RTED terms and states will follow up w/ Dr Sharif Meyer as scheduled  DDX considered for Abscess (no clinical s/s of infective process)  / Disk Herniation: no new neuro symptoms or weakness / Acute on Chronic Lumbar radiculopathy  Acute bilateral low back pain with bilateral sciatica: complicated acute illness or injury  Chronic bilateral low back pain with bilateral sciatica: complicated acute illness or injury  Lumbar radiculopathy: self-limited or minor problem  Risk  Prescription drug management  Disposition  Final diagnoses:   Chronic bilateral low back pain with bilateral sciatica   Acute bilateral low back pain with bilateral sciatica   Lumbar radiculopathy     Time reflects when diagnosis was documented in both MDM as applicable and the Disposition within this note     Time User Action Codes Description Comment    2/5/2023  4:55 PM Raya Russell [P58 97,  M54 41,  G89 29] Chronic bilateral low back pain with bilateral sciatica     2/5/2023  4:55 PM Kathleen Murray [M85 05,  M54 41] Acute bilateral low back pain with bilateral sciatica     2/5/2023  5:02 PM Kathleen Murray [E86 09] Lumbar radiculopathy       ED Disposition     ED Disposition   Discharge    Condition   Stable    Date/Time   Sun Feb 5, 2023  4:55 PM    Comment   Traceyyara Carrillo discharge to home/self care  Follow-up Information     Follow up With Specialties Details Why Contact Info Additional Willard Nguyen MD Neurosurgery Go to  Appointment as scheduled  Elgin Olivares DO Family Medicine Schedule an appointment as soon as possible for a visit  Schedule appointment for outpatient follow up  [address] [place] [address] Emergency Department Emergency Medicine Go to  If symptoms worsen with any weakness, loss of bowel or bladder continence   [address] [place],[address] [place] [address] [address] Emergency Department, [address] [place] [address] , Maira, Luige Apolinar [address]          Discharge Medication List as of 2/5/2023  5:05 PM      START taking these medications    Details   predniSONE 20 mg tablet Take 2 tablets (40 mg total) by mouth daily for 5 days, Starting Sun 2/5/2023, Until Fri 2/10/2023, Normal         CONTINUE these medications which have NOT CHANGED    Details   ACCU-CHEK FABIANO PLUS test strip 4 (four) times a day, Starting Mon 12/17/2018, Historical Med      ACCU-CHEK FASTCLIX LANCETS MISC 4 (four) times a day, Starting Sat 12/22/2018, Historical Med      Acetaminophen (TYLENOL EXTRA STRENGTH PO) Take 3 tablets by mouth every 6 (six) hours, Historical Med      celecoxib (CeleBREX) 200 mg capsule Take 1 PO BID PRN for pain with food and NO other NSAIDS , Normal      CHOLECALCIFEROL PO Take 5,000 Units by mouth daily, Historical Med      clonazePAM (KlonoPIN) 1 mg tablet Take 1 mg by mouth 2 (two) times a day, Starting Mon 10/24/2022, Historical Med      cyclobenzaprine (FLEXERIL) 10 mg tablet Take 10 mg by mouth daily at bedtime as needed, Starting Tue 11/29/2022, Historical Med      folic acid (FOLVITE) 1 mg tablet Take 2,000 mcg by mouth daily, Starting Mon 12/17/2018, Historical Med      GLOBAL INJECT EASE INSULIN SYR 31G X 5/16" 1 ML MISC 2 (two) times a day, Starting Sat 12/22/2018, Historical Med      GNP ASPIRIN LOW DOSE 81 MG EC tablet Take 81 mg by mouth daily, Starting Mon 12/17/2018, Historical Med      hydrOXYzine pamoate (VISTARIL) 25 mg capsule Take 50 mg by mouth daily at bedtime, Starting Sat 7/2/2022, Historical Med      ipratropium (ATROVENT) 0 06 % nasal spray 3 (three) times a day, Starting Wed 6/29/2022, Historical Med      Jardiance 10 MG TABS Take 10 mg by mouth daily, Starting Wed 7/20/2022, Historical Med      ketoconazole (NIZORAL) 2 % shampoo daily Use as directed, Starting Wed 10/10/2018, Historical Med      Lantus SoloStar 100 units/mL SOPN Inject 37 mg under the skin 2 (two) times a day with meals, Starting Sun 7/10/2022, Historical Med      losartan-hydrochlorothiazide (HYZAAR) 100-25 MG per tablet Take 1 tablet by mouth daily, Starting Mon 12/17/2018, Historical Med      lovastatin (MEVACOR) 20 mg tablet Take 20 mg by mouth daily, Starting Mon 12/17/2018, Historical Med      metFORMIN (GLUCOPHAGE) 1000 MG tablet Take 1,000 mg by mouth 2 (two) times a day with meals , Starting Wed 1/2/2019, Historical Med      metoprolol succinate (TOPROL-XL) 50 mg 24 hr tablet Take 75 mg by mouth daily, Historical Med      mirtazapine (REMERON) 45 MG tablet Take 45 mg by mouth daily at bedtime, Starting Tue 12/11/2018, Historical Med      NIFEdipine (PROCARDIA XL) 30 mg 24 hr tablet TAKE 1 TABLET BY MOUTH DAILY in addition TO 90mg FOR A total OF 120mg DAILY, Historical Med      NIFEdipine (PROCARDIA XL) 90 mg 24 hr tablet Take 90 mg by mouth daily, Starting Mon 12/17/2018, Historical Med      sertraline (ZOLOFT) 25 mg tablet Take 100 mg by mouth daily, Historical Med             No discharge procedures on file      PDMP Review       Value Time User    PDMP Reviewed  Yes 8/15/2022  3:08 PM Artem Reese PA-C          ED Provider  Electronically Signed by           Lia Nicholson PA-C  02/05/23 7538

## 2023-02-05 NOTE — DISCHARGE INSTRUCTIONS
Trend blood glucose in the outpatient setting and adjust insulin regimens as required if worsening blood glucose  Contact PCP or endocrinologist for directions for management  Return to Emergency Department if development of any signs or symptoms of persistent nausea or vomitting or unable to tolerate oral liquids or solid food

## 2023-02-08 ENCOUNTER — TELEPHONE (OUTPATIENT)
Dept: OTHER | Facility: OTHER | Age: 68
End: 2023-02-08

## 2023-02-08 ENCOUNTER — PATIENT MESSAGE (OUTPATIENT)
Dept: PAIN MEDICINE | Facility: CLINIC | Age: 68
End: 2023-02-08

## 2023-02-08 DIAGNOSIS — M47.816 LUMBAR SPONDYLOSIS: ICD-10-CM

## 2023-02-08 DIAGNOSIS — G89.4 CHRONIC PAIN SYNDROME: Primary | ICD-10-CM

## 2023-02-08 DIAGNOSIS — M54.50 CHRONIC BILATERAL LOW BACK PAIN WITHOUT SCIATICA: ICD-10-CM

## 2023-02-08 DIAGNOSIS — G89.29 CHRONIC BILATERAL LOW BACK PAIN WITHOUT SCIATICA: ICD-10-CM

## 2023-02-08 DIAGNOSIS — M48.062 SPINAL STENOSIS OF LUMBAR REGION WITH NEUROGENIC CLAUDICATION: ICD-10-CM

## 2023-02-08 DIAGNOSIS — M54.16 LUMBAR RADICULOPATHY: ICD-10-CM

## 2023-02-08 RX ORDER — TRAMADOL HYDROCHLORIDE 50 MG/1
TABLET ORAL
Qty: 60 TABLET | Refills: 1 | Status: SHIPPED | OUTPATIENT
Start: 2023-02-08

## 2023-02-08 NOTE — TELEPHONE ENCOUNTER
Patient's wife called that her  surgery would possibly need to be reschedule due to he tested positive for COVID on 2/2/2023   Please her call back to clarify if so since he should be recovered before 2/28 Phone# [phone]

## 2023-02-08 NOTE — TELEPHONE ENCOUNTER
From: Douglas Mccurdy  To: Cass Paniagua  Sent: 2/8/2023 2:58 PM EST  Subject: Urgent [facility], as you know the stimulator did not help Naty Phipps we went to see Dr Madisyn Stark and Naty Phipps has a scheduled surgery for February 28  Now due to Covid as of February 2 it Alireza to be rescheduled so now it’s rescheduled for March 28  in the meantime, Naty Phipps is in extreme back pain and cannot sleep  His balance is off where he has fallen last night  I cannot take the pain anymore please can you help with something for him from now till March 28 as he cannot keep going through all this pain  I am his wife  I control his medicines and what he takes  I definitely need something for him for pain  I can’t go through this anymore seeing him in such pain, we’ve been to the emergency room twice   I took him to the emergency room they gave him 40 mg of prednisone in a pill and a shot of tramadol helped him some and then 40 mg of prednisone for five days, he’s back being in pain again  Please let me know ASAP     Thank you,  Keisha Zuniga and Douglas Mccurdy

## 2023-02-14 ENCOUNTER — TELEMEDICINE (OUTPATIENT)
Dept: BEHAVIORAL/MENTAL HEALTH CLINIC | Facility: CLINIC | Age: 68
End: 2023-02-14

## 2023-02-14 DIAGNOSIS — F31.81 BIPOLAR II DISORDER, MOST RECENT EPISODE MAJOR DEPRESSIVE (HCC): Primary | ICD-10-CM

## 2023-02-14 NOTE — PSYCH
Behavioral Health Psychotherapy Progress Note    Psychotherapy Provided: Individual Psychotherapy     1  Bipolar II disorder, most recent episode major depressive ([facility] [address] )            Goals addressed in session: Goal 1     DATA:   D:  Reached client on amwell embedded  Client was friendly and cooperative  She stated she  Visited his brother's, Marietta Huerta, (2 years in ) grave site, visited his parent's grave sites  [place] sad and drained  Still feeling grief issues related to Grant's death  Memories of playing guitar together, playing together, we did a lot of things together, and now he's not here  I was closest to him  Feel empty, lonely  Grant's 5 children all disowned him  He was  29 years and his wife cheated on him  She remarried and her kids didn't talk to him for 30 years  The last 2 years one of his daughters looked him up because she needed money  He only met one of his 10 grandkids  When he  all the kids came, his ex-wife came  "I choose to forgive him - [facility] "    Client was depressed and frustrated  He is in chronic pain and exploring surgery options  The prior medical intervention did not work  However, he is hopeful that the surgery will bring relief  Provided empathy and support  Celebrated victories and empathized with challenges  A:  Client appeared casually dressed, appropriate to the season  He was sad struggling with feelings of loss and grief  He was alert and oriented x3  Speech was normal in rate, volume, and quantity  Mood was sad, discouraged, with low energy  Affect was consistent with mood  Thought process was logical, clear, coherent and goal directed  No abnormalities of thought were noted  No issues with SI or HI  Insight and judgment were fair  Attention during session was good      Got covid, then took the 5 day meds, lost his sense of taste, very tired, not sleeping well, dry mouth from meds,   "I am on an emotional roller coaster- sometimes I am fine, sometimes I am sad "  Client expressed his feelings of sadness and loss over the death of his brother  P:  Return in 1-2 weeks  Continue to add coping skills to Counseling Journal and practice them daily  Continue to reach out to family and friends for support, engagement, and encouragement  Continue to talk with doctors about options for his back pain  Continue to pray regularly as this can be very supportive and healing for him  During this session, this clinician used the following therapeutic modalities: Client-centered Therapy    Substance Abuse was addressed during this session  If the client is diagnosed with a co-occurring substance use disorder, please indicate any changes in the frequency or amount of use: NO USE  Stage of change for addressing substance use diagnoses: Maintenance    ASSESSMENT:  Butch Redman presents with a Euthymic/ normal and Dysthymic mood  his affect is Normal range and intensity, which is congruent, with his mood and the content of the session  The client has made progress on their goals  Butch Redman presents with a minimal risk of suicide, minimal risk of self-harm, and minimal risk of harm to others  For any risk assessment that surpasses a "low" rating, a safety plan must be developed  A safety plan was indicated: no  If yes, describe in detail reach out to family and friends for support    PLAN: Between sessions, Butch Redman will use learned copingskills, pace himself,   At the next session, the therapist will use Solution-Focused Therapy to address issues of depression, pain management, and coping  Behavioral Health Treatment Plan and Discharge Planning: Butch Redman is aware of and agrees to continue to work on their treatment plan  They have identified and are working toward their discharge goals   yes    Visit start and stop times:    02/20/23  Start Time: 1105  Stop Time: 1147  Total Visit Time: 43 minutes    Virtual Regular Visit    Verification of patient location:    Patient is located in the following state in which I hold an active license [place]      Assessment/Plan:    Problem List Items Addressed This Visit    None  Visit Diagnoses     Bipolar II disorder, most recent episode major depressive ([facility] [address] )    -  Primary          Goals addressed in session: Goal 1          Reason for visit is   Chief Complaint   Patient presents with   • Virtual Regular Visit        Encounter provider Addie Alvarez, PhD    Provider located at [address] [place] [id number] [phone]      Recent Visits  Date Type Provider Dept   02/14/23 Telemedicine Addie Alvarez, PhD Mariah Nobles recent visits within past 7 days and meeting all other requirements  Future Appointments  No visits were found meeting these conditions  Showing future appointments within next 150 days and meeting all other requirements       The patient was identified by name and date of birth  Josiane Howard was informed that this is a telemedicine visit and that the visit is being conducted throughthe [employer] platform  He agrees to proceed     My office door was closed  No one else was in the room  He acknowledged consent and understanding of privacy and security of the video platform  The patient has agreed to participate and understands they can discontinue the visit at any time  Patient is aware this is a billable service  Subjective  Josiane Howard is a 74 y o  male who was friendly and cooperative         HPI     Past Medical History:   Diagnosis Date   • Anxiety    • Arthritis    • Cancer ([facility] [address] )    • Depression    • Diabetes mellitus ([facility] [address] )    • Hypertension    • Liver disease    • Mitral valve prolapse    • PONV (postoperative nausea and vomiting)    • Thyroid disease        Past Surgical History:   Procedure Laterality Date   • INCISION AND DRAINAGE OF WOUND Left 8/12/2022    Procedure: INCISION AND DRAINAGE (I&D) EXTREMITY;  Surgeon: Josse Lennon DPM;  Location: [facility] MAIN OR;  Service: Podiatry   • JOINT REPLACEMENT Left 03/11/2022    Left TSA   • THYROID SURGERY  2021    remove cancer       Current Outpatient Medications   Medication Sig Dispense Refill   • ACCU-CHEK FABIANO PLUS test strip 4 (four) times a day  1   • ACCU-CHEK FASTCLIX LANCETS MISC 4 (four) times a day  1   • Acetaminophen (TYLENOL EXTRA STRENGTH PO) Take 3 tablets by mouth every 6 (six) hours     • celecoxib (CeleBREX) 200 mg capsule Take 1 PO BID PRN for pain with food and NO other NSAIDS   60 capsule 2   • CHOLECALCIFEROL PO Take 5,000 Units by mouth daily     • clonazePAM (KlonoPIN) 1 mg tablet Take 1 mg by mouth 2 (two) times a day     • cyclobenzaprine (FLEXERIL) 10 mg tablet Take 10 mg by mouth daily at bedtime as needed     • folic acid (FOLVITE) 1 mg tablet Take 2,000 mcg by mouth daily  6   • GLOBAL INJECT EASE INSULIN SYR 31G X 5/16" 1 ML MISC 2 (two) times a day  0   • GNP ASPIRIN LOW DOSE 81 MG EC tablet Take 81 mg by mouth daily  0   • hydrOXYzine pamoate (VISTARIL) 25 mg capsule Take 50 mg by mouth daily at bedtime     • ipratropium (ATROVENT) 0 06 % nasal spray 3 (three) times a day     • Jardiance 10 MG TABS Take 10 mg by mouth daily     • ketoconazole (NIZORAL) 2 % shampoo daily Use as directed  6   • Lantus SoloStar 100 units/mL SOPN Inject 37 mg under the skin 2 (two) times a day with meals     • losartan-hydrochlorothiazide (HYZAAR) 100-25 MG per tablet Take 1 tablet by mouth daily  0   • lovastatin (MEVACOR) 20 mg tablet Take 20 mg by mouth daily  3   • metFORMIN (GLUCOPHAGE) 1000 MG tablet Take 1,000 mg by mouth 2 (two) times a day with meals      • metoprolol succinate (TOPROL-XL) 50 mg 24 hr tablet Take 75 mg by mouth daily     • mirtazapine (REMERON) 45 MG tablet Take 45 mg by mouth daily at bedtime  1   • NIFEdipine (PROCARDIA XL) 30 mg 24 hr tablet TAKE 1 TABLET BY MOUTH DAILY in addition TO 90mg FOR A total OF 120mg DAILY     • NIFEdipine (PROCARDIA XL) 90 mg 24 hr tablet Take 90 mg by mouth daily  3   • sertraline (ZOLOFT) 25 mg tablet Take 100 mg by mouth daily     • traMADol (Ultram) 50 mg tablet Take 1 PO BID PRN for pain  60 tablet 1     No current facility-administered medications for this visit  Allergies   Allergen Reactions   • Cephalexin Rash   • Abilify [Aripiprazole] Other (See Comments)     Shaking     • Molds & Smuts    • Pregabalin Other (See Comments)     Lyrica - shaking feeling       Review of Systems    Video Exam    There were no vitals filed for this visit      Physical Exam     02/20/23  Start Time: 4006  Stop Time: 4855  Total Visit Time: 42 minutes

## 2023-02-16 NOTE — PRE-PROCEDURE INSTRUCTIONS
Pre-Surgery Instructions:   Medication Instructions   • Acetaminophen (TYLENOL EXTRA STRENGTH PO) Instructed to take per normal schedule including DOS with sips water   • celecoxib (CeleBREX) 200 mg capsule Instructed to stop all Vitamins and Supplements over the counter 1 week pror to  surgery   • clonazePAM (KlonoPIN) 1 mg tablet Instructed to take per normal schedule including DOS with sips water   • cyclobenzaprine (FLEXERIL) 10 mg tablet Take per normal schedule    • folic acid (FOLVITE) 1 mg tablet Instructed to avoid all Vit/Supp 1 week prior to surgery and to avoid NSAIDs 3 days prior to surgery   • GNP ASPIRIN LOW DOSE 81 MG EC tablet Instructions provided by MD   • hydrOXYzine pamoate (VISTARIL) 25 mg capsule Take per normal schedule    • ipratropium (ATROVENT) 0 06 % nasal spray Take per normal schedule    • Jardiance 10 MG TABS Instructed to take per normal schedule except DOS   • Lantus SoloStar 100 units/mL SOPN Instructed OK to take half of long acting insulin DOS but no Reg insulin DOS   • losartan-hydrochlorothiazide (HYZAAR) 100-25 MG per tablet Instructed to take per normal schedule except DOS   • lovastatin (MEVACOR) 20 mg tablet Instructed to take per normal schedule including DOS with sips water   • metFORMIN (GLUCOPHAGE) 1000 MG tablet Instructed to take per normal schedule except DOS   • metoprolol succinate (TOPROL-XL) 50 mg 24 hr tablet Instructed to take per normal schedule including DOS with sips water   • mirtazapine (REMERON) 45 MG tablet Take per normal schedule    • NIFEdipine (PROCARDIA XL) 30 mg 24 hr tablet Instructed to take per normal schedule including DOS with sips water   • NIFEdipine (PROCARDIA XL) 90 mg 24 hr tablet Instructed to take per normal schedule including DOS with sips water   • sertraline (ZOLOFT) 25 mg tablet Instructed to take per normal schedule including DOS with sips water   • traMADol (Ultram) 50 mg tablet Take per normal schedule as needed    Pre op,medications and showering instructions reviewed per Dimitry's protocol-Patient has hibiclens and Wipes/Coths  Instructed to avoid all ASA and OTC Vit/Supp 1 week prior to surgery and to avoid NSAIDs 3 days prior to surgery per anesthesia instructions  Tylenol ok to take prn  Pt 's wife Verbalized an understanding of all instructions reviewed and offers no concerns at this time

## 2023-02-17 NOTE — PROGRESS NOTES
Daily Note     Today's date: 2022  Patient name: Natalee Smith  : 1955  MRN: [id number]  Referring provider: Gustavo Gutierrez MD  Dx:   Encounter Diagnosis     ICD-10-CM    1  Chronic left shoulder pain  M25 512     G89 29    2  Status post total replacement of left shoulder  Z96 612                   Subjective: Pt reports he has been in a lot of pain lately and cancelled his last appointment and considered cancelling today  Objective: See treatment diary below      Assessment: Modified session today to focus on manual therapy and pt mental health  Pt has been dealing with a lot mentally and needed positive feedback and encouragement  He noted that he knows his numbers are getting better as far as ROM and strength but he is still experiencing a lot of pain in his shoulder and other body parts which has been challenging and weighing on him mentally  He felt better post manual therapy and session was ended at that time  It was discussed and has no thought of harming himself or anyone else  Educated pt on pain and neuromuscular re-education and how pain and mental status can effect pain  Tolerated treatment fair  Patient would benefit from continued PT      Plan: Continue per plan of care  Precautions: s/p L TSA  Other factors: FALLS RISK  Pt goals:  EPOC: 6/10/22  F/u with referring:      HEP:  Access Code: [email or web address]  URL: [email or web address]  Date: 2022  Prepared by: Denzel Aland    Exercises  · Supine Shoulder External Rotation with Dowel - 20 reps  · Supine Shoulder Flexion Extension AAROM with Dowel - 20 reps  · Standing Shoulder Abduction AAROM with Dowel - 20 reps            Manuals  6   L shoulder PROM WE     MM CF WE WE WE   R UT and cervical STM WE     MM CF WE WE pec release WE   Rhythmic stab WE     MM CF WE WE WE    25'            Neuro Re-Ed             scap retraction      20x 20x  20     Prone squeeze Ther Ex             pendulums 20x            pulleys 3'/3'     3 min 3 min 3'/3' 3'/3' 3'/3'   Dowel sh' flex      20x supine 20x  supine 20x  Supine  2# cuff  x20 Supine  2# cuff  x20   Dowel sh' ER      supine 20x supine 20x  supine 20x  Supine  x20 Supine  x20   Dowel sh' abd             Sh' iso flex/ext, abd/add, er/ir      5x10" ea 5 x 10" ea  5"x10 ea 5"x10 ea 5"x20 ea   arom sh' flex      x10 w cane x10 @ mirror 10x @ mirror 10x @ mirror x10   arom sh' ext             arom sh' ER/IR             arom sh' scap       10x @ mirror  10x @ mirror 10x @ mirror x10   arom sh' abd       10x @ mirror 10x @ mirror 10x @ mirror x10   Bent over Rows             SL ER      20x 20x  20x x20 20   SL ABD and H ABD      20x ea 20x ea  20x ea x20 ea 20   TB Rows         GTB  x15 GTB  x15   TB Ext         GTB  x15 GTB  x15                Ther Activity                                       Gait Training                                       Modalities             Cp prn Yes

## 2023-03-14 ENCOUNTER — APPOINTMENT (OUTPATIENT)
Dept: LAB | Facility: CLINIC | Age: 68
End: 2023-03-14

## 2023-03-14 ENCOUNTER — TELEMEDICINE (OUTPATIENT)
Dept: BEHAVIORAL/MENTAL HEALTH CLINIC | Facility: CLINIC | Age: 68
End: 2023-03-14

## 2023-03-14 DIAGNOSIS — M47.816 LUMBAR SPONDYLOSIS: ICD-10-CM

## 2023-03-14 DIAGNOSIS — M48.062 SPINAL STENOSIS OF LUMBAR REGION WITH NEUROGENIC CLAUDICATION: ICD-10-CM

## 2023-03-14 DIAGNOSIS — Z01.812 PRE-OPERATIVE LABORATORY EXAMINATION: ICD-10-CM

## 2023-03-14 DIAGNOSIS — M54.16 LUMBAR RADICULOPATHY: ICD-10-CM

## 2023-03-14 DIAGNOSIS — F10.21 ALCOHOLISM IN REMISSION (HCC): ICD-10-CM

## 2023-03-14 DIAGNOSIS — E11.42 DIABETIC PERIPHERAL NEUROPATHY (HCC): ICD-10-CM

## 2023-03-14 DIAGNOSIS — F31.81 BIPOLAR II DISORDER, MOST RECENT EPISODE MAJOR DEPRESSIVE (HCC): Primary | ICD-10-CM

## 2023-03-14 LAB
ALBUMIN SERPL BCP-MCNC: 3.8 G/DL (ref 3.5–5)
ALP SERPL-CCNC: 78 U/L (ref 46–116)
ALT SERPL W P-5'-P-CCNC: 22 U/L (ref 12–78)
ANION GAP SERPL CALCULATED.3IONS-SCNC: 5 MMOL/L (ref 4–13)
APTT PPP: 27 SECONDS (ref 23–37)
AST SERPL W P-5'-P-CCNC: 18 U/L (ref 5–45)
BASOPHILS # BLD AUTO: 0.07 THOUSANDS/ΜL (ref 0–0.1)
BASOPHILS NFR BLD AUTO: 1 % (ref 0–1)
BILIRUB SERPL-MCNC: 0.23 MG/DL (ref 0.2–1)
BILIRUB UR QL STRIP: NEGATIVE
BUN SERPL-MCNC: 14 MG/DL (ref 5–25)
CALCIUM SERPL-MCNC: 9.6 MG/DL (ref 8.3–10.1)
CHLORIDE SERPL-SCNC: 100 MMOL/L (ref 96–108)
CLARITY UR: CLEAR
CO2 SERPL-SCNC: 31 MMOL/L (ref 21–32)
COLOR UR: COLORLESS
CREAT SERPL-MCNC: 0.89 MG/DL (ref 0.6–1.3)
EOSINOPHIL # BLD AUTO: 0.13 THOUSAND/ΜL (ref 0–0.61)
EOSINOPHIL NFR BLD AUTO: 2 % (ref 0–6)
ERYTHROCYTE [DISTWIDTH] IN BLOOD BY AUTOMATED COUNT: 15.9 % (ref 11.6–15.1)
EST. AVERAGE GLUCOSE BLD GHB EST-MCNC: 134 MG/DL
GFR SERPL CREATININE-BSD FRML MDRD: 89 ML/MIN/1.73SQ M
GLUCOSE P FAST SERPL-MCNC: 85 MG/DL (ref 65–99)
GLUCOSE UR STRIP-MCNC: ABNORMAL MG/DL
HBA1C MFR BLD: 6.3 %
HCT VFR BLD AUTO: 42.1 % (ref 36.5–49.3)
HGB BLD-MCNC: 13.3 G/DL (ref 12–17)
HGB UR QL STRIP.AUTO: NEGATIVE
IMM GRANULOCYTES # BLD AUTO: 0.04 THOUSAND/UL (ref 0–0.2)
IMM GRANULOCYTES NFR BLD AUTO: 0 % (ref 0–2)
INR PPP: 0.89 (ref 0.84–1.19)
KETONES UR STRIP-MCNC: NEGATIVE MG/DL
LEUKOCYTE ESTERASE UR QL STRIP: NEGATIVE
LYMPHOCYTES # BLD AUTO: 2.17 THOUSANDS/ΜL (ref 0.6–4.47)
LYMPHOCYTES NFR BLD AUTO: 24 % (ref 14–44)
MCH RBC QN AUTO: 27.4 PG (ref 26.8–34.3)
MCHC RBC AUTO-ENTMCNC: 31.6 G/DL (ref 31.4–37.4)
MCV RBC AUTO: 87 FL (ref 82–98)
MONOCYTES # BLD AUTO: 0.57 THOUSAND/ΜL (ref 0.17–1.22)
MONOCYTES NFR BLD AUTO: 6 % (ref 4–12)
NEUTROPHILS # BLD AUTO: 5.96 THOUSANDS/ΜL (ref 1.85–7.62)
NEUTS SEG NFR BLD AUTO: 67 % (ref 43–75)
NITRITE UR QL STRIP: NEGATIVE
NRBC BLD AUTO-RTO: 0 /100 WBCS
PH UR STRIP.AUTO: 7 [PH]
PLATELET # BLD AUTO: 421 THOUSANDS/UL (ref 149–390)
PMV BLD AUTO: 10.2 FL (ref 8.9–12.7)
POTASSIUM SERPL-SCNC: 4 MMOL/L (ref 3.5–5.3)
PROT SERPL-MCNC: 7.6 G/DL (ref 6.4–8.4)
PROT UR STRIP-MCNC: NEGATIVE MG/DL
PROTHROMBIN TIME: 12.2 SECONDS (ref 11.6–14.5)
RBC # BLD AUTO: 4.85 MILLION/UL (ref 3.88–5.62)
SODIUM SERPL-SCNC: 136 MMOL/L (ref 135–147)
SP GR UR STRIP.AUTO: 1.01 (ref 1–1.03)
UROBILINOGEN UR STRIP-ACNC: <2 MG/DL
WBC # BLD AUTO: 8.94 THOUSAND/UL (ref 4.31–10.16)
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
## 2023-03-14 NOTE — BH CRISIS PLAN
Client Name: Frank Thomson       Client YOB: 1955  : 1955    Treatment Team (include name and contact information):     Psychotherapist:Aliyah Jha    Psychiatrist: none   Release of information completed: no    :    Release of information completed: no      Healthcare Provider   Name:  Kelsie Santos   Address: [facility]   Telephone Number:     Type of Plan   * Child plans (children 15 yo and younger) must be completed and signed by the child's legal guardian   * Plans for all individuals 15 yo and above must be signed by the client  Plan Type: adolescent/adult (15 and over) Initial      My Personal Strengths are (in the client's own words):  I love working on cars, especially with my boys  I have a car I am working on with my boys and I took it to a car show and it won the trophy  Now it is really looking nice  I have my own little shop (rent) all tools and my car there  The stressors and triggers that may put me at risk are:    loneliness, like when my wife is working and my boys are out, I am alone  It creates a vacuum  I try to use the lonely time to pray     Coping skills I can use to keep myself calm and safe:  [place]/meditate    Coping skills/supports I can use to maintain abstinence from substance use:   Applied for medical marijuana card at recommendation of my doctor  I have used it for pain - it makes me feel "stiff" but the pain comes back within an hour  The people that provide me with help and support: (Include name, contact, and how they can help)   Support person #1: my wife, Brando Curran    * Phone number: [phone]    * How can they help me? She has my food, my clothes washed, helps me, she is amazing  Helped me with my diabetes  Support person #2:my sister, Mamta Alcantar    * Phone number: client was disconnected before he could provide info    * How can they help me? She lives [facility], she's 76   I go to her because she has been part of my prayers  We talk about God, miracles, youtube prayers  Support person #3: my children    * Phone number: client will provide next session    * How can they help me? My boys are great kids  My oldest one is 44  He's good, been sober for 8 years, bought 2 houses, , had another kid  Deirdre Anaya is , I am staying with him now  [facility] - both supervisors-  One at [employer] and my other son works for another company where he supervises 38 people     In the past, the following has helped me in times of crisis:    Calling a family member      If it is an emergency and you need immediate help, call 9-1-1    If there is a possibility of danger to yourself or others, call the following crisis hotline resources:     Adult Crisis Numbers  Suicide Prevention Hotline - Dial 9-8-8  [facility]: [address]: [address]: [address]: [address] [place] [address] Avenue: [address] [place] [address] Street: [address] [place] [address] Avenue: [phone]      In the event your feelings become unmanageable, and you cannot reach your support system, you will call 911 immediately or go to the nearest hospital emergency room

## 2023-03-15 ENCOUNTER — LAB REQUISITION (OUTPATIENT)
Dept: LAB | Facility: HOSPITAL | Age: 68
End: 2023-03-15

## 2023-03-15 ENCOUNTER — TELEPHONE (OUTPATIENT)
Dept: NEUROSURGERY | Facility: CLINIC | Age: 68
End: 2023-03-15

## 2023-03-15 DIAGNOSIS — Z01.812 ENCOUNTER FOR PREPROCEDURAL LABORATORY EXAMINATION: ICD-10-CM

## 2023-03-15 LAB
ABO GROUP BLD: NORMAL
BLD GP AB SCN SERPL QL: NEGATIVE
MRSA NOSE QL CULT: NORMAL
RH BLD: POSITIVE
SPECIMEN EXPIRATION DATE: NORMAL

## 2023-03-15 NOTE — TELEPHONE ENCOUNTER
Received a call from patient's spouse Sharyle Done questioning the results of his recent bloodwork  Returned call to Sharyle Done and advised there is nothing that looks concerning however encouraged they reach out to his PCP for further discussion and management of these values  She stated he has an appt with his PCP on Monday  She was appreciative of the call back

## 2023-03-17 ENCOUNTER — TELEPHONE (OUTPATIENT)
Dept: NEUROSURGERY | Facility: CLINIC | Age: 68
End: 2023-03-17

## 2023-03-17 LAB
ATRIAL RATE: 82 BPM
P AXIS: 37 DEGREES
PR INTERVAL: 136 MS
QRS AXIS: 34 DEGREES
QRSD INTERVAL: 86 MS
QT INTERVAL: 368 MS
QTC INTERVAL: 429 MS
T WAVE AXIS: 35 DEGREES
VENTRICULAR RATE: 82 BPM

## 2023-03-17 NOTE — TELEPHONE ENCOUNTER
Received call on nurseline from patient's spouse stating they had questions regarding the patient's preprocedural ekg, labs, and physical  Will route message to surgery scheduler

## 2023-03-23 ENCOUNTER — HOSPITAL ENCOUNTER (OUTPATIENT)
Dept: NUCLEAR MEDICINE | Facility: HOSPITAL | Age: 68
End: 2023-03-23

## 2023-03-23 ENCOUNTER — HOSPITAL ENCOUNTER (OUTPATIENT)
Dept: NON INVASIVE DIAGNOSTICS | Facility: HOSPITAL | Age: 68
Discharge: HOME | End: 2023-03-23

## 2023-03-23 DIAGNOSIS — E11.29 TYPE 2 DIABETES MELLITUS WITH OTHER DIABETIC KIDNEY COMPLICATION, WITH LONG-TERM CURRENT USE OF INSULIN (HCC): ICD-10-CM

## 2023-03-23 DIAGNOSIS — Z79.4 TYPE 2 DIABETES MELLITUS WITH OTHER DIABETIC KIDNEY COMPLICATION, WITH LONG-TERM CURRENT USE OF INSULIN (HCC): ICD-10-CM

## 2023-03-23 DIAGNOSIS — I10 ESSENTIAL HYPERTENSION, MALIGNANT: ICD-10-CM

## 2023-03-23 DIAGNOSIS — Z82.49 FAMILY HISTORY OF ISCHEMIC HEART DISEASE: ICD-10-CM

## 2023-03-23 DIAGNOSIS — E78.2 MIXED HYPERLIPIDEMIA: ICD-10-CM

## 2023-03-23 LAB
NUC STRESS EJECTION FRACTION: 59 %
SL CV REST NUCLEAR ISOTOPE DOSE: 10.1 MCI
SL CV STRESS NUCLEAR ISOTOPE DOSE: 32.4 MCI
STRESS ANGINA INDEX: 0
STRESS BASELINE HR: 88 BPM
STRESS POST PEAK BP: 120 MMHG
STRESS ST DEPRESSION: 0 MM
STRESS/REST PERFUSION RATIO: 1.06

## 2023-03-23 RX ADMIN — REGADENOSON 0.4 MG: 0.08 INJECTION, SOLUTION INTRAVENOUS at 14:22

## 2023-03-28 ENCOUNTER — APPOINTMENT (OUTPATIENT)
Dept: NUCLEAR MEDICINE | Facility: HOSPITAL | Age: 68
End: 2023-03-28

## 2023-03-28 ENCOUNTER — APPOINTMENT (OUTPATIENT)
Dept: NON INVASIVE DIAGNOSTICS | Facility: HOSPITAL | Age: 68
End: 2023-03-28

## 2023-03-30 LAB
CHEST PAIN STATEMENT: NORMAL
MAX DIASTOLIC BP: 75 MMHG
MAX HEART RATE: 101 BPM
MAX PREDICTED HEART RATE: 155 BPM
MAX. SYSTOLIC BP: 120 MMHG
PROTOCOL NAME: NORMAL
REASON FOR TERMINATION: NORMAL
TARGET HR FORMULA: NORMAL
TEST INDICATION: NORMAL
TIME IN EXERCISE PHASE: NORMAL

## 2023-04-06 RX ORDER — FLUTICASONE PROPIONATE 50 MCG
1 SPRAY, SUSPENSION (ML) NASAL 2 TIMES DAILY
COMMUNITY

## 2023-04-06 NOTE — PRE-PROCEDURE INSTRUCTIONS
Pre-Surgery Instructions:   Medication Instructions   • Acetaminophen (TYLENOL EXTRA STRENGTH PO) Take day of surgery  • CHOLECALCIFEROL PO Stop taking 7 days prior to surgery  • clonazePAM (KlonoPIN) 1 mg tablet Take day of surgery  • cyclobenzaprine (FLEXERIL) 10 mg tablet Uses PRN- OK to take day of surgery   • fluticasone (FLONASE) 50 mcg/act nasal spray Take day of surgery  • folic acid (FOLVITE) 1 mg tablet Stop taking 7 days prior to surgery  • GNP ASPIRIN LOW DOSE 81 MG EC tablet Stop taking 7 days prior to surgery  per surgeon   • hydrOXYzine pamoate (VISTARIL) 50 mg capsule Take night before surgery   • Jardiance 10 MG TABS Instructed to hold 4 days prior to procedure-Last dose 4/6 am    • ketoconazole (NIZORAL) 2 % shampoo Take day of surgery  • Lantus SoloStar 100 units/mL SOPN Take day of surgery  • losartan-hydrochlorothiazide (HYZAAR) 100-25 MG per tablet Hold day of surgery  • lovastatin (MEVACOR) 20 mg tablet Take day of surgery  • metFORMIN (GLUCOPHAGE) 1000 MG tablet Hold day of surgery  • METOPROLOL SUCCINATE ER PO Take day of surgery  • mirtazapine (REMERON) 45 MG tablet Take night before surgery   • NIFEdipine (PROCARDIA XL) 30 mg 24 hr tablet Take day of surgery  • NIFEdipine (PROCARDIA XL) 90 mg 24 hr tablet Take day of surgery  • sertraline (ZOLOFT) 100 mg tablet Take day of surgery  • traMADol (Ultram) 50 mg tablet Uses PRN- OK to take day of surgery    Medication instructions for day surgery reviewed  Please use only a sip of water to take your instructed medications  Avoid all over the counter vitamins, supplements and NSAIDS for one week prior to surgery per anesthesia guidelines  Tylenol is ok to take as needed  You will receive a call one business day prior to surgery with an arrival time and hospital directions  If your surgery is scheduled on a Monday, the hospital will be calling you on the Friday prior to your surgery   If you have not heard from anyone by 8pm, please call the hospital supervisor through the hospital  at [phone]  Zoya Vasques [phone])  Do not eat or drink anything after midnight the night before your surgery, including candy, mints, lifesavers, or chewing gum  Do not drink alcohol 24hrs before your surgery  Try not to smoke at least 24hrs before your surgery  Follow the pre surgery showering instructions as listed in the [facility] Surgical Experience Booklet” or otherwise provided by your surgeon's office  Do not shave the surgical area 48 hours before surgery  Do not apply any lotions, creams, including makeup, cologne, deodorant, or perfumes after showering on the day of your surgery  Has CHG soap/cloths    No contact lenses, eye make-up, or artificial eyelashes  Remove nail polish, including gel polish, and any artificial, gel, or acrylic nails if possible  Remove all jewelry including rings and body piercing jewelry  Wear causal clothing that is easy to take on and off  Consider your type of surgery  Keep any valuables, jewelry, piercings at home  Please bring any specially ordered equipment (sling, braces) if indicated  Arrange for a responsible person to drive you to and from the hospital on the day of your surgery  Visitor Guidelines discussed  Call the surgeon's office with any new illnesses, exposures, or additional questions prior to surgery  Please reference your [facility] Surgical Experience Booklet” for additional information to prepare for your upcoming surgery

## 2023-04-11 ENCOUNTER — APPOINTMENT (OUTPATIENT)
Dept: RADIOLOGY | Facility: HOSPITAL | Age: 68
End: 2023-04-11

## 2023-04-11 ENCOUNTER — HOSPITAL ENCOUNTER (INPATIENT)
Facility: HOSPITAL | Age: 68
LOS: 11 days | End: 2023-04-22
Attending: NEUROLOGICAL SURGERY | Admitting: NEUROLOGICAL SURGERY

## 2023-04-11 ENCOUNTER — APPOINTMENT (INPATIENT)
Dept: RADIOLOGY | Facility: HOSPITAL | Age: 68
End: 2023-04-11

## 2023-04-11 DIAGNOSIS — R33.9 URINARY RETENTION: ICD-10-CM

## 2023-04-11 DIAGNOSIS — G89.4 CHRONIC PAIN SYNDROME: ICD-10-CM

## 2023-04-11 DIAGNOSIS — Z98.1 S/P LUMBAR FUSION: Primary | ICD-10-CM

## 2023-04-11 DIAGNOSIS — R11.2 PONV (POSTOPERATIVE NAUSEA AND VOMITING): ICD-10-CM

## 2023-04-11 DIAGNOSIS — M47.816 LUMBAR SPONDYLOSIS: ICD-10-CM

## 2023-04-11 DIAGNOSIS — C73 THYROID CANCER (HCC): ICD-10-CM

## 2023-04-11 DIAGNOSIS — Z98.890 PONV (POSTOPERATIVE NAUSEA AND VOMITING): ICD-10-CM

## 2023-04-11 DIAGNOSIS — K70.30 ALCOHOLIC CIRRHOSIS OF LIVER WITHOUT ASCITES (HCC): ICD-10-CM

## 2023-04-11 DIAGNOSIS — M54.16 LUMBAR RADICULOPATHY: ICD-10-CM

## 2023-04-11 DIAGNOSIS — E11.42 DIABETIC PERIPHERAL NEUROPATHY (HCC): ICD-10-CM

## 2023-04-11 DIAGNOSIS — M48.062 SPINAL STENOSIS OF LUMBAR REGION WITH NEUROGENIC CLAUDICATION: ICD-10-CM

## 2023-04-11 LAB
GLUCOSE SERPL-MCNC: 213 MG/DL (ref 65–140)
GLUCOSE SERPL-MCNC: 229 MG/DL (ref 65–140)
GLUCOSE SERPL-MCNC: 231 MG/DL (ref 65–140)
GLUCOSE SERPL-MCNC: 251 MG/DL (ref 65–140)
HCT VFR BLD AUTO: 31.7 % (ref 36.5–49.3)
HCT VFR BLD AUTO: 33.8 % (ref 36.5–49.3)
HGB BLD-MCNC: 10 G/DL (ref 12–17)
HGB BLD-MCNC: 10.6 G/DL (ref 12–17)
PLATELET # BLD AUTO: 312 THOUSANDS/UL (ref 149–390)
PMV BLD AUTO: 9.9 FL (ref 8.9–12.7)

## 2023-04-11 PROCEDURE — 01NB0ZZ RELEASE LUMBAR NERVE, OPEN APPROACH: ICD-10-PCS | Performed by: NEUROLOGICAL SURGERY

## 2023-04-11 PROCEDURE — 0SG10AJ FUSION OF 2 OR MORE LUMBAR VERTEBRAL JOINTS WITH INTERBODY FUSION DEVICE, POSTERIOR APPROACH, ANTERIOR COLUMN, OPEN APPROACH: ICD-10-PCS | Performed by: NEUROLOGICAL SURGERY

## 2023-04-11 PROCEDURE — 0RGA0AJ FUSION OF THORACOLUMBAR VERTEBRAL JOINT WITH INTERBODY FUSION DEVICE, POSTERIOR APPROACH, ANTERIOR COLUMN, OPEN APPROACH: ICD-10-PCS | Performed by: NEUROLOGICAL SURGERY

## 2023-04-11 PROCEDURE — 0SG30AJ FUSION OF LUMBOSACRAL JOINT WITH INTERBODY FUSION DEVICE, POSTERIOR APPROACH, ANTERIOR COLUMN, OPEN APPROACH: ICD-10-PCS | Performed by: NEUROLOGICAL SURGERY

## 2023-04-11 DEVICE — SCREW 54840046545 4.75 ATS MAS 6.5X45
Type: IMPLANTABLE DEVICE | Site: SPINE LUMBAR | Status: FUNCTIONAL
Brand: CD HORIZON® SPINAL SYSTEM

## 2023-04-11 DEVICE — BIOLOGICS 7600706 MSTRGRFT PUTY 6 CC KIT
Type: IMPLANTABLE DEVICE | Site: SPINE LUMBAR | Status: FUNCTIONAL
Brand: MASTERGRAFT®PUTTY

## 2023-04-11 RX ORDER — OXYCODONE HYDROCHLORIDE 5 MG/1
10 TABLET ORAL EVERY 4 HOURS PRN
Status: DISCONTINUED | OUTPATIENT
Start: 2023-04-11 | End: 2023-04-15

## 2023-04-11 RX ORDER — SODIUM CHLORIDE, SODIUM LACTATE, POTASSIUM CHLORIDE, CALCIUM CHLORIDE 600; 310; 30; 20 MG/100ML; MG/100ML; MG/100ML; MG/100ML
125 INJECTION, SOLUTION INTRAVENOUS CONTINUOUS
Status: DISCONTINUED | OUTPATIENT
Start: 2023-04-11 | End: 2023-04-12

## 2023-04-11 RX ORDER — MELATONIN
5000 DAILY
Status: DISCONTINUED | OUTPATIENT
Start: 2023-04-12 | End: 2023-04-22 | Stop reason: HOSPADM

## 2023-04-11 RX ORDER — LIDOCAINE 50 MG/G
2 PATCH TOPICAL DAILY
Status: DISCONTINUED | OUTPATIENT
Start: 2023-04-12 | End: 2023-04-12

## 2023-04-11 RX ORDER — MAGNESIUM HYDROXIDE 1200 MG/15ML
LIQUID ORAL AS NEEDED
Status: DISCONTINUED | OUTPATIENT
Start: 2023-04-11 | End: 2023-04-11 | Stop reason: HOSPADM

## 2023-04-11 RX ORDER — GABAPENTIN 300 MG/1
300 CAPSULE ORAL 2 TIMES DAILY
Status: DISCONTINUED | OUTPATIENT
Start: 2023-04-11 | End: 2023-04-15

## 2023-04-11 RX ORDER — NIFEDIPINE 30 MG/1
120 TABLET, EXTENDED RELEASE ORAL EVERY MORNING
Status: DISCONTINUED | OUTPATIENT
Start: 2023-04-12 | End: 2023-04-22 | Stop reason: HOSPADM

## 2023-04-11 RX ORDER — LOSARTAN POTASSIUM 50 MG/1
100 TABLET ORAL EVERY MORNING
Status: DISCONTINUED | OUTPATIENT
Start: 2023-04-11 | End: 2023-04-22 | Stop reason: HOSPADM

## 2023-04-11 RX ORDER — SCOLOPAMINE TRANSDERMAL SYSTEM 1 MG/1
1 PATCH, EXTENDED RELEASE TRANSDERMAL ONCE
Status: DISCONTINUED | OUTPATIENT
Start: 2023-04-11 | End: 2023-04-11

## 2023-04-11 RX ORDER — LIDOCAINE HYDROCHLORIDE AND EPINEPHRINE 10; 10 MG/ML; UG/ML
INJECTION, SOLUTION INFILTRATION; PERINEURAL AS NEEDED
Status: DISCONTINUED | OUTPATIENT
Start: 2023-04-11 | End: 2023-04-11 | Stop reason: HOSPADM

## 2023-04-11 RX ORDER — ONDANSETRON 2 MG/ML
4 INJECTION INTRAMUSCULAR; INTRAVENOUS EVERY 6 HOURS PRN
Status: DISCONTINUED | OUTPATIENT
Start: 2023-04-11 | End: 2023-04-22 | Stop reason: HOSPADM

## 2023-04-11 RX ORDER — HYDROMORPHONE HCL IN WATER/PF 6 MG/30 ML
0.2 PATIENT CONTROLLED ANALGESIA SYRINGE INTRAVENOUS EVERY 4 HOURS PRN
Status: DISCONTINUED | OUTPATIENT
Start: 2023-04-11 | End: 2023-04-17

## 2023-04-11 RX ORDER — HEPARIN SODIUM 5000 [USP'U]/ML
5000 INJECTION, SOLUTION INTRAVENOUS; SUBCUTANEOUS EVERY 8 HOURS SCHEDULED
Status: DISCONTINUED | OUTPATIENT
Start: 2023-04-12 | End: 2023-04-22 | Stop reason: HOSPADM

## 2023-04-11 RX ORDER — MIRTAZAPINE 15 MG/1
30 TABLET, FILM COATED ORAL
Status: DISCONTINUED | OUTPATIENT
Start: 2023-04-11 | End: 2023-04-22 | Stop reason: HOSPADM

## 2023-04-11 RX ORDER — OXYCODONE HYDROCHLORIDE 5 MG/1
5 TABLET ORAL EVERY 4 HOURS PRN
Status: DISCONTINUED | OUTPATIENT
Start: 2023-04-11 | End: 2023-04-15

## 2023-04-11 RX ORDER — SERTRALINE HYDROCHLORIDE 100 MG/1
100 TABLET, FILM COATED ORAL EVERY MORNING
Status: DISCONTINUED | OUTPATIENT
Start: 2023-04-12 | End: 2023-04-22 | Stop reason: HOSPADM

## 2023-04-11 RX ORDER — VANCOMYCIN HYDROCHLORIDE 1 G/200ML
1000 INJECTION, SOLUTION INTRAVENOUS ONCE
Status: COMPLETED | OUTPATIENT
Start: 2023-04-11 | End: 2023-04-11

## 2023-04-11 RX ORDER — ACETAMINOPHEN 325 MG/1
975 TABLET ORAL ONCE
Status: DISCONTINUED | OUTPATIENT
Start: 2023-04-11 | End: 2023-04-11

## 2023-04-11 RX ORDER — ONDANSETRON 2 MG/ML
4 INJECTION INTRAMUSCULAR; INTRAVENOUS EVERY 4 HOURS PRN
Status: DISCONTINUED | OUTPATIENT
Start: 2023-04-11 | End: 2023-04-11

## 2023-04-11 RX ORDER — BUPIVACAINE HYDROCHLORIDE AND EPINEPHRINE 5; 5 MG/ML; UG/ML
INJECTION, SOLUTION EPIDURAL; INTRACAUDAL; PERINEURAL AS NEEDED
Status: DISCONTINUED | OUTPATIENT
Start: 2023-04-11 | End: 2023-04-11 | Stop reason: HOSPADM

## 2023-04-11 RX ORDER — FENTANYL CITRATE/PF 50 MCG/ML
25 SYRINGE (ML) INJECTION
Status: DISCONTINUED | OUTPATIENT
Start: 2023-04-11 | End: 2023-04-11 | Stop reason: HOSPADM

## 2023-04-11 RX ORDER — ONDANSETRON 2 MG/ML
4 INJECTION INTRAMUSCULAR; INTRAVENOUS ONCE
Status: DISCONTINUED | OUTPATIENT
Start: 2023-04-11 | End: 2023-04-11 | Stop reason: HOSPADM

## 2023-04-11 RX ORDER — METHOCARBAMOL 500 MG/1
500 TABLET, FILM COATED ORAL EVERY 6 HOURS SCHEDULED
Status: DISCONTINUED | OUTPATIENT
Start: 2023-04-11 | End: 2023-04-15

## 2023-04-11 RX ORDER — HYDROMORPHONE HCL/PF 1 MG/ML
0.5 SYRINGE (ML) INJECTION
Status: DISCONTINUED | OUTPATIENT
Start: 2023-04-11 | End: 2023-04-11 | Stop reason: HOSPADM

## 2023-04-11 RX ORDER — INSULIN LISPRO 100 [IU]/ML
1-5 INJECTION, SOLUTION INTRAVENOUS; SUBCUTANEOUS
Status: DISCONTINUED | OUTPATIENT
Start: 2023-04-11 | End: 2023-04-14

## 2023-04-11 RX ORDER — INSULIN LISPRO 100 [IU]/ML
1-6 INJECTION, SOLUTION INTRAVENOUS; SUBCUTANEOUS
Status: DISCONTINUED | OUTPATIENT
Start: 2023-04-11 | End: 2023-04-11

## 2023-04-11 RX ORDER — ACETAMINOPHEN 325 MG/1
975 TABLET ORAL EVERY 8 HOURS SCHEDULED
Status: DISCONTINUED | OUTPATIENT
Start: 2023-04-11 | End: 2023-04-22 | Stop reason: HOSPADM

## 2023-04-11 RX ORDER — CHLORHEXIDINE GLUCONATE 0.12 MG/ML
15 RINSE ORAL ONCE
Status: COMPLETED | OUTPATIENT
Start: 2023-04-11 | End: 2023-04-11

## 2023-04-11 RX ORDER — HYDROXYZINE HYDROCHLORIDE 25 MG/1
50 TABLET, FILM COATED ORAL EVERY 6 HOURS PRN
Status: DISCONTINUED | OUTPATIENT
Start: 2023-04-11 | End: 2023-04-22 | Stop reason: HOSPADM

## 2023-04-11 RX ORDER — FOLIC ACID 1 MG/1
2000 TABLET ORAL DAILY
Status: DISCONTINUED | OUTPATIENT
Start: 2023-04-12 | End: 2023-04-22 | Stop reason: HOSPADM

## 2023-04-11 RX ORDER — DOCUSATE SODIUM 100 MG/1
100 CAPSULE, LIQUID FILLED ORAL 2 TIMES DAILY
Status: DISCONTINUED | OUTPATIENT
Start: 2023-04-11 | End: 2023-04-22 | Stop reason: HOSPADM

## 2023-04-11 RX ORDER — HYDROCHLOROTHIAZIDE 25 MG/1
25 TABLET ORAL EVERY MORNING
Status: DISCONTINUED | OUTPATIENT
Start: 2023-04-11 | End: 2023-04-22 | Stop reason: HOSPADM

## 2023-04-11 RX ORDER — VANCOMYCIN HYDROCHLORIDE 1 G/200ML
1000 INJECTION, SOLUTION INTRAVENOUS EVERY 12 HOURS
Status: COMPLETED | OUTPATIENT
Start: 2023-04-11 | End: 2023-04-11

## 2023-04-11 RX ORDER — SENNOSIDES 8.6 MG
1 TABLET ORAL DAILY
Status: DISCONTINUED | OUTPATIENT
Start: 2023-04-12 | End: 2023-04-22 | Stop reason: HOSPADM

## 2023-04-11 RX ORDER — GABAPENTIN 300 MG/1
300 CAPSULE ORAL ONCE
Status: COMPLETED | OUTPATIENT
Start: 2023-04-11 | End: 2023-04-11

## 2023-04-11 RX ADMIN — FENTANYL CITRATE 25 MCG: 50 INJECTION, SOLUTION INTRAMUSCULAR; INTRAVENOUS at 15:41

## 2023-04-11 RX ADMIN — DOCUSATE SODIUM 100 MG: 100 CAPSULE, LIQUID FILLED ORAL at 17:39

## 2023-04-11 RX ADMIN — VANCOMYCIN HYDROCHLORIDE 1000 MG: 1 INJECTION, SOLUTION INTRAVENOUS at 18:09

## 2023-04-11 RX ADMIN — GABAPENTIN 300 MG: 300 CAPSULE ORAL at 06:44

## 2023-04-11 RX ADMIN — LOSARTAN POTASSIUM 100 MG: 50 TABLET, FILM COATED ORAL at 17:39

## 2023-04-11 RX ADMIN — INSULIN LISPRO 2 UNITS: 100 INJECTION, SOLUTION INTRAVENOUS; SUBCUTANEOUS at 21:30

## 2023-04-11 RX ADMIN — METHOCARBAMOL 500 MG: 500 TABLET ORAL at 23:47

## 2023-04-11 RX ADMIN — ACETAMINOPHEN 325MG 975 MG: 325 TABLET ORAL at 17:39

## 2023-04-11 RX ADMIN — METHOCARBAMOL 500 MG: 500 TABLET ORAL at 17:40

## 2023-04-11 RX ADMIN — OXYCODONE HYDROCHLORIDE 10 MG: 5 TABLET ORAL at 20:07

## 2023-04-11 RX ADMIN — CHLORHEXIDINE GLUCONATE 15 ML: 1.2 RINSE ORAL at 06:44

## 2023-04-11 RX ADMIN — HYDROMORPHONE HYDROCHLORIDE 0.2 MG: 0.2 INJECTION, SOLUTION INTRAMUSCULAR; INTRAVENOUS; SUBCUTANEOUS at 23:47

## 2023-04-11 RX ADMIN — INSULIN LISPRO 2 UNITS: 100 INJECTION, SOLUTION INTRAVENOUS; SUBCUTANEOUS at 17:43

## 2023-04-11 RX ADMIN — MIRTAZAPINE 30 MG: 15 TABLET, FILM COATED ORAL at 21:27

## 2023-04-11 RX ADMIN — SODIUM CHLORIDE, SODIUM LACTATE, POTASSIUM CHLORIDE, AND CALCIUM CHLORIDE 125 ML/HR: .6; .31; .03; .02 INJECTION, SOLUTION INTRAVENOUS at 17:37

## 2023-04-11 RX ADMIN — HYDROMORPHONE HYDROCHLORIDE 0.5 MG: 1 INJECTION, SOLUTION INTRAMUSCULAR; INTRAVENOUS; SUBCUTANEOUS at 16:09

## 2023-04-11 RX ADMIN — SCOPALAMINE 1 PATCH: 1 PATCH, EXTENDED RELEASE TRANSDERMAL at 06:43

## 2023-04-11 RX ADMIN — HYDROXYZINE HYDROCHLORIDE 50 MG: 25 TABLET, FILM COATED ORAL at 17:39

## 2023-04-11 RX ADMIN — VANCOMYCIN HYDROCHLORIDE 1000 MG: 1 INJECTION, SOLUTION INTRAVENOUS at 06:55

## 2023-04-11 RX ADMIN — HYDROMORPHONE HYDROCHLORIDE 0.2 MG: 0.2 INJECTION, SOLUTION INTRAMUSCULAR; INTRAVENOUS; SUBCUTANEOUS at 17:40

## 2023-04-11 RX ADMIN — FENTANYL CITRATE 25 MCG: 50 INJECTION, SOLUTION INTRAMUSCULAR; INTRAVENOUS at 15:52

## 2023-04-11 RX ADMIN — HYDROCHLOROTHIAZIDE 25 MG: 25 TABLET ORAL at 17:39

## 2023-04-11 RX ADMIN — GABAPENTIN 300 MG: 300 CAPSULE ORAL at 17:39

## 2023-04-11 NOTE — CONSULTS
[place]  Consult  Name: Kimber George 74 y o  male I MRN: [id number]  Unit/Bed#: -01 SDU I Date of Admission: 4/11/2023   Date of Service: 4/11/2023 I Hospital Day: 0    Inpatient consult to Bismark Zhou performed by: RUIZ Barreto  Consult ordered by: Carmen De Santiago MD          Assessment/Plan   * Spinal stenosis of lumbar region with neurogenic claudication  Assessment & Plan  · Patient with a history of chronic lower back pain and radiculopathy secondary to lumbar spondylosis and stenosis  He follows with neurosurgery, Dr Kenya Escalante  He has had a spinal cord stimulator trial, which helped some but he continued with lower back pain  · Patient is postop day 0 status post T12-S1 fusion and drain placement   · admitted to ICU postoperative  · Acute pain service consulted, may require ketamine infusion for pain control  · Currently patient rating pain 10 out of 10    He has Dilaudid and oxy codon as needed  · Patient is for upright x-rays tonight  · To continue on vancomycin for 24 hours postop  · Starting cardiopulmonary monitoring  · Routine monitoring neurovascular checks  · PT OT ordered    Lumbar spondylosis  Assessment & Plan  See plan above    PONV (postoperative nausea and vomiting)  Assessment & Plan  · Zofran PRN    DM (diabetes mellitus) ([facility] [address] )  Assessment & Plan  Lab Results   Component Value Date    HGBA1C 6 3 (H) 03/14/2023       Recent Labs     04/11/23  0645 04/11/23  1515 04/11/23  1706   POCGLU 251* 229* 213*       Blood Sugar Average: Last 72 hrs:  (P) 231   · Home regiment: jardiance, lantus and glipizide  · Starting SSI ACHS here and jardiance tomorrow  · Carb controlled diet    Cirrhosis, alcoholic (HCC)  Assessment & Plan  · LFTs wnl in march  · Check LFTs in AM    Benzodiazepine dependence ([facility] [address] )  Assessment & Plan  · Holding home klonopin in the setting of acute pain  · Consider restarting tomorrow           History of Present "Illness     HPI: Khalida Crenshaw is a 74 y o  who status post T12-S1 fusion, postop day 0  Admitted to ICU postop for close monitoring  Patient with a past medical history of spinal stenosis in the lumbar region with neurogenic claudication, insulin-dependent diabetes, cirrhosis, medical marijuana use, chronic benzodiazepine use  History obtained from chart review and the patient  ROS: Patient complaining of 10 out of 10 back pain, denies nausea, shortness of breath, chest pain  All other complaints negative      Historical Information   Past Medical History:  No date: Anxiety  No date: Arthritis  No date: Cancer ([address] )  No date: Depression  No date: Diabetes mellitus (HCC)  No date: Hypertension  No date: Liver disease  No date: Mitral valve prolapse  No date: PONV (postoperative nausea and vomiting)  No date: Thyroid disease Past Surgical History:  8/12/2022: INCISION AND DRAINAGE OF WOUND; Left      Comment:  Procedure: INCISION AND DRAINAGE (I&D) EXTREMITY;                 Surgeon: Gurjit Pineda DPM;  Location:  MAIN OR;                 Service: Podiatry  03/11/2022: JOINT REPLACEMENT;  Left      Comment:  Left TSA  2021: THYROID SURGERY      Comment:  remove cancer   Current Outpatient Medications   Medication Instructions   • ACCU-CHEK FABIANO PLUS test strip 4 times daily   • ACCU-CHEK FASTCLIX LANCETS MISC 4 times daily   • Acetaminophen (TYLENOL EXTRA STRENGTH PO) 3 tablets, Oral, Every 6 hours, 500 mg tablet   • CHOLECALCIFEROL PO 5,000 Units, Oral, Daily   • clonazePAM (KLONOPIN) 1 mg, Oral, 2 times daily   • cyclobenzaprine (FLEXERIL) 10 mg, Oral, 3 times daily PRN   • fluticasone (FLONASE) 50 mcg/act nasal spray 1 spray, Nasal, 2 times daily   • folic acid (FOLVITE) 1,485 mcg, Oral, Daily   • GLOBAL INJECT EASE INSULIN SYR 31G X 5/16\" 1 ML MISC 2 times daily   • GNP Aspirin Low Dose 81 mg, Oral, Daily   • hydrOXYzine pamoate (VISTARIL) 50 mg, Oral, Daily at bedtime   • Jardiance 10 mg, Oral, " Every morning   • ketoconazole (NIZORAL) 2 % shampoo 1 application  , Topical, Daily, Use as directed   • Lantus SoloStar 30 Units, Subcutaneous, Every morning   • losartan-hydrochlorothiazide (HYZAAR) 100-25 MG per tablet 1 tablet, Oral, Every morning   • lovastatin (MEVACOR) 20 mg, Oral, Every morning   • metFORMIN (GLUCOPHAGE) 1,000 mg, Oral, 2 times daily with meals   • METOPROLOL SUCCINATE ER PO 75 mg, Oral, Every morning   • mirtazapine (REMERON) 45 mg, Oral, Daily at bedtime   • NIFEdipine (PROCARDIA XL) 120 mg, Oral, Every morning, Takes 90 mg and 30 mg =120 mg every AM   • NIFEdipine (PROCARDIA XL) 120 mg, Oral, Every morning, Takes 90 mg and 30 mg =120 mg every AM   • sertraline (ZOLOFT) 100 mg, Oral, Every morning   • traMADol (Ultram) 50 mg tablet Take 1 PO BID PRN for pain  Allergies   Allergen Reactions   • Abilify [Aripiprazole] Tremor     Shaking     • Cephalexin Rash   • Molds & Smuts Allergic Rhinitis   • Pregabalin Tremor     Lyrica - shaking feeling      Social History     Tobacco Use   • Smoking status: Former     Packs/day: 0 25     Types: Cigarettes     Quit date:      Years since quittin 3   • Smokeless tobacco: Never   • Tobacco comments:     quit    Vaping Use   • Vaping Use: Never used   Substance Use Topics   • Alcohol use: Yes     Comment: Socially   • Drug use: Yes     Frequency: 7 0 times per week     Types: Marijuana     Comment: Medical    History reviewed  No pertinent family history       Objective                            Vitals I/O      Most Recent Min/Max in 24hrs   Temp 98 4 °F (36 9 °C) Temp  Min: 97 6 °F (36 4 °C)  Max: 98 4 °F (36 9 °C)   Pulse 88 Pulse  Min: 76  Max: 88   Resp 13 Resp  Min: 6  Max: 22   /71 BP  Min: 124/58  Max: 187/85   O2 Sat 100 % SpO2  Min: 97 %  Max: 100 %      Intake/Output Summary (Last 24 hours) at 2023 5581  Last data filed at 2023 1600  Gross per 24 hour   Intake 3500 ml   Output 1930 ml   Net 1570 ml         Diet Aj/CHO Controlled; Consistent Carbohydrate Diet Level 2 (5 carb servings/75 grams CHO/meal)     Invasive Monitoring Physical exam    Physical Exam  Constitutional:       General: He is in acute distress  Appearance: He is obese  HENT:      Head: Normocephalic and atraumatic  Nose: Nose normal       Mouth/Throat:      Mouth: Mucous membranes are moist       Pharynx: Oropharynx is clear  Eyes:      Extraocular Movements: Extraocular movements intact  Conjunctiva/sclera: Conjunctivae normal       Pupils: Pupils are equal, round, and reactive to light  Cardiovascular:      Rate and Rhythm: Normal rate and regular rhythm  Pulses: Normal pulses  Heart sounds: Normal heart sounds  Pulmonary:      Effort: Pulmonary effort is normal  No respiratory distress  Breath sounds: Normal breath sounds  No wheezing or rales  Abdominal:      General: Bowel sounds are normal  There is no distension  Palpations: Abdomen is soft  Tenderness: There is no abdominal tenderness  Musculoskeletal:         General: Normal range of motion  Cervical back: Normal range of motion and neck supple  Skin:     General: Skin is warm and dry  Comments: Surgical dressing clean dry and intact   Neurological:      General: No focal deficit present  Mental Status: He is alert and oriented to person, place, and time  Mental status is at baseline  Cranial Nerves: No cranial nerve deficit  Sensory: No sensory deficit  Motor: No weakness  Psychiatric:         Mood and Affect: Mood normal               Diagnostic Studies    EKG: Normal sinus rhythm, heart rate 88 bpm  Imaging:  I have personally reviewed pertinent reports         Medications:  Scheduled PRN   acetaminophen, 975 mg, Q8H [address]  [START ON 4/12/2023] cholecalciferol, 5,000 Units, Daily  docusate sodium, 100 mg, BID  [START ON 4/12/2023] Empagliflozin, 10 mg, QAM  [START ON 1/67/6615] folic acid, 8,647 mcg, Daily  gabapentin, 300 mg, BID  [START ON 4/12/2023] heparin (porcine), 5,000 Units, Q8H [facility]  losartan, 100 mg, QAM   And  hydrochlorothiazide, 25 mg, QAM  insulin lispro, 1-5 Units, 4x Daily (AC & HS)  [START ON 4/12/2023] lidocaine, 2 patch, Daily  methocarbamol, 500 mg, Q6H [facility]  [START ON 4/12/2023] metoprolol succinate, 75 mg, QAM  mirtazapine, 30 mg, HS  [START ON 4/12/2023] NIFEdipine, 120 mg, QAM  [START ON 4/12/2023] senna, 1 tablet, Daily  [START ON 4/12/2023] sertraline, 100 mg, QAM  vancomycin, 1,000 mg, Q12H      HYDROmorphone, 0 2 mg, Q4H PRN  hydrOXYzine HCL, 50 mg, Q6H PRN  lactated ringers, 1,000 mL, Once PRN   And  lactated ringers, 1,000 mL, Once PRN  naloxone, 0 04 mg, Q1MIN PRN  ondansetron, 4 mg, Q6H PRN  oxyCODONE, 10 mg, Q4H PRN  oxyCODONE, 5 mg, Q4H PRN  sodium chloride, 1,000 mL, Once PRN   And  sodium chloride, 1,000 mL, Once PRN       Continuous    lactated ringers, 125 mL/hr, Last Rate: 125 mL/hr (04/11/23 1737)         Labs:    CBC    Recent Labs     04/11/23  1709   HGB 10 6*   HCT 33 8*        BMP    No recent results    Coags    No recent results     Additional Electrolytes  No recent results       Blood Gas    No recent results  No recent results LFTs  No recent results    Infectious  No recent results  Glucose  No recent results            RUIZ Membreno

## 2023-04-11 NOTE — INTERVAL H&P NOTE
"H&P reviewed  After examining the patient I find no changes in the patients condition since the H&P had been written  Patient personally seen and examined  Neurological examination unchanged compared to last office/progress note, with the following exceptions:    BP (!) 187/85   Pulse 77   Temp 97 6 °F (36 4 °C) (Temporal)   Resp 18   Ht 5' 8\" (1 727 m)   Wt 84 5 kg (186 lb 4 6 oz)   SpO2 98%   BMI 28 33 kg/m²      None  Regular cardiac rate and rhythm  No respiratory distress  Abdomen nontender  Normocephalic  Continues to describe pain rating down both legs and in the lower back with motion  Grossly full power in lower extremities aside from 4/5 power on dorsiflexion bilaterally and bilateral knee extension  Reports diminished light touch sensation in stocking distribution  Has stopped antiplatelet/anticoagulation medication as instructed  CODE STATUS personally reviewed with patient and significant other at bedside  He is full code  Post operative instructions and medications have been reviewed with the patient and family  Assessment and Plan:    All questions have been answered to the patient and family satisfaction  Plan to proceed with L1-S1 navigated posterior decompression with instrumented fixation fusion and possible extension to additional levels  Patient also understands that interbody grafts may be placed  They are in agreement with proceeding      "

## 2023-04-11 NOTE — PLAN OF CARE
Problem: Prexisting or High Potential for Compromised Skin Integrity  Goal: Skin integrity is maintained or improved  Description: INTERVENTIONS:  - Identify patients at risk for skin breakdown  - Assess and monitor skin integrity  - Assess and monitor nutrition and hydration status  - Monitor labs   - Assess for incontinence   - Turn and reposition patient  - Assist with mobility/ambulation  - Relieve pressure over bony prominences  - Avoid friction and shearing  - Provide appropriate hygiene as needed including keeping skin clean and dry  - Evaluate need for skin moisturizer/barrier cream  - Collaborate with interdisciplinary team   - Patient/family teaching  - Consider wound care consult   Outcome: Progressing     Problem: MOBILITY - ADULT  Goal: Maintain or return to baseline ADL function  Description: INTERVENTIONS:  -  Assess patient's ability to carry out ADLs; assess patient's baseline for ADL function and identify physical deficits which impact ability to perform ADLs (bathing, care of mouth/teeth, toileting, grooming, dressing, etc )  - Assess/evaluate cause of self-care deficits   - Assess range of motion  - Assess patient's mobility; develop plan if impaired  - Assess patient's need for assistive devices and provide as appropriate  - Encourage maximum independence but intervene and supervise when necessary  - Involve family in performance of ADLs  - Assess for home care needs following discharge   - Consider OT consult to assist with ADL evaluation and planning for discharge  - Provide patient education as appropriate  Outcome: Progressing  Goal: Maintains/Returns to pre admission functional level  Description: INTERVENTIONS:  - Perform BMAT or MOVE assessment daily    - Set and communicate daily mobility goal to care team and patient/family/caregiver     - Collaborate with rehabilitation services on mobility goals if consulted    - Out of bed for toileting  - Record patient progress and toleration of activity level   Outcome: Progressing     Problem: PAIN - ADULT  Goal: Verbalizes/displays adequate comfort level or baseline comfort level  Description: Interventions:  - Encourage patient to monitor pain and request assistance  - Assess pain using appropriate pain scale  - Administer analgesics based on type and severity of pain and evaluate response  - Implement non-pharmacological measures as appropriate and evaluate response  - Consider cultural and social influences on pain and pain management  - Notify physician/advanced practitioner if interventions unsuccessful or patient reports new pain  Outcome: Progressing     Problem: INFECTION - ADULT  Goal: Absence or prevention of progression during hospitalization  Description: INTERVENTIONS:  - Assess and monitor for signs and symptoms of infection  - Monitor lab/diagnostic results  - Monitor all insertion sites, i e  indwelling lines, tubes, and drains  - Monitor endotracheal if appropriate and nasal secretions for changes in amount and color  - [place] appropriate cooling/warming therapies per order  - Administer medications as ordered  - Instruct and encourage patient and family to use good hand hygiene technique  - Identify and instruct in appropriate isolation precautions for identified infection/condition  Outcome: Progressing  Goal: Absence of fever/infection during neutropenic period  Description: INTERVENTIONS:  - Monitor WBC    Outcome: Progressing     Problem: SAFETY ADULT  Goal: Maintain or return to baseline ADL function  Description: INTERVENTIONS:  -  Assess patient's ability to carry out ADLs; assess patient's baseline for ADL function and identify physical deficits which impact ability to perform ADLs (bathing, care of mouth/teeth, toileting, grooming, dressing, etc )  - Assess/evaluate cause of self-care deficits   - Assess range of motion  - Assess patient's mobility; develop plan if impaired  - Assess patient's need for assistive devices and provide as appropriate  - Encourage maximum independence but intervene and supervise when necessary  - Involve family in performance of ADLs  - Assess for home care needs following discharge   - Consider OT consult to assist with ADL evaluation and planning for discharge  - Provide patient education as appropriate  Outcome: Progressing  Goal: Maintains/Returns to pre admission functional level  Description: INTERVENTIONS:  - Perform BMAT or MOVE assessment daily    - Set and communicate daily mobility goal to care team and patient/family/caregiver     - Collaborate with rehabilitation services on mobility goals if consulted    - Out of bed for toileting  - Record patient progress and toleration of activity level   Outcome: Progressing  Goal: Patient will remain free of falls  Description: INTERVENTIONS:  - Educate patient/family on patient safety including physical limitations  - Instruct patient to call for assistance with activity   - Consult OT/PT to assist with strengthening/mobility   - Keep Call bell within reach  - Keep bed low and locked with side rails adjusted as appropriate  - Keep care items and personal belongings within reach  - Initiate and maintain comfort rounds  - Make Fall Risk Sign visible to staff    - Apply yellow socks and bracelet for high fall risk patients  - Consider moving patient to room near nurses station  Outcome: Progressing     Problem: DISCHARGE PLANNING  Goal: Discharge to home or other facility with appropriate resources  Description: INTERVENTIONS:  - Identify barriers to discharge w/patient and caregiver  - Arrange for needed discharge resources and transportation as appropriate  - Identify discharge learning needs (meds, wound care, etc )  - Arrange for interpretive services to assist at discharge as needed  - Refer to Case Management Department for coordinating discharge planning if the patient needs post-hospital services based on physician/advanced practitioner order or complex needs related to functional status, cognitive ability, or social support system  Outcome: Progressing     Problem: Knowledge Deficit  Goal: Patient/family/caregiver demonstrates understanding of disease process, treatment plan, medications, and discharge instructions  Description: Complete learning assessment and assess knowledge base    Interventions:  - Provide teaching at level of understanding  - Provide teaching via preferred learning methods  Outcome: Progressing

## 2023-04-11 NOTE — ASSESSMENT & PLAN NOTE
Lab Results   Component Value Date    HGBA1C 6 3 (H) 03/14/2023       Recent Labs     04/11/23  0645 04/11/23  1515 04/11/23  1706   POCGLU 251* 229* 213*       Blood Sugar Average: Last 72 hrs:  (P) 231   · Home regiment: jardiance, lantus and glipizide  · Starting SSI ACHS here and jardiance tomorrow  · Carb controlled diet

## 2023-04-11 NOTE — QUICK NOTE
Post-Op Check: Neurosurgery    Subjective: patient states his only complaint currently is incisional pain  He states the pain is 10/10, the dilaudid helped for a small amount of time  Currently denies chest pain, shortness of breath, lightheadedness, dizziness  Not out of bed, has not urinated yet  Objective:  Vitals:    04/11/23 1745   BP: (!) 173/90   Pulse: 90   Resp: 18   Temp:    SpO2: 100%     General: patient is in no acute distress, laying in bed  CV: RRR, no murmurs  Pulm: CTA b/l, no adventitious breath sounds  Trachea midline  Abdomen: soft, NTND  Neuro: moves all extremities to gravity  Full AROM  Sensation intact to b/l UE and LE  Skin: incision intact with dressing in place, drain with blood tinged s/s output  A/P:  Lumbar stenosis with neurogenic claudication:  - POD #0 s/p T12-S1 lumbar fusion with decompression  - admit to ICU for post-op monitoring  - APS consult, appreciate assistance with pain control  - continue post op vanco as ordered  - out of bed to chair for meals  xrays tonight  - NV checks  - PT/OT  - Kelly removed in OR, retention protocol  - post-op hypoTN protocol  Monitor H/H, 10 6 which may be a component of dilutional  Monitor drain output    - needs incentive spirometer    DM2  - A1c 6 3 in March 2023  - SSI ordered AC and bedtime, jardiance for tomorrow  - CCD2    HTN:  - continue home meds with hold precautions  - monitor BP, post-op hypoTN protocol    Depression:  - continue home derrick Mendoza PA-C

## 2023-04-11 NOTE — OP NOTE
OPERATIVE REPORT  PATIENT NAME: Carmen Reyes    :  1955  MRN: [id number]  Pt Location: [facility] OR ROOM 03    SURGERY DATE: 2023    Surgeon(s) and Role:     * Gege Andrews MD - Primary     * Katja Oshea PA-C - Assisting    No qualified resident was available  DANIEL was present for the procedure, and provided essential assistance with proper exposure, retraction, hemostasis, instrumentation and decompression, and wound closure, which was necessary secondary to the complex nature of this case  Preop Diagnosis:  Spinal stenosis of lumbar region with neurogenic claudication [M48 062]  Lumbar spondylosis [M47 816]  Lumbar radiculopathy [M54 16]    Post-Op Diagnosis Codes:     * Spinal stenosis of lumbar region with neurogenic claudication [M48 062]     * Lumbar spondylosis [L09 138]     * Lumbar radiculopathy [M54 16]    Procedure(s):  1  T12-S1 navigated posterior instrumented fixation fusion with Medtronic Solera, locally harvested autograft and master graft  2   L1-L2 and L4-5 posterior decompression with bilateral laminectomy and total facetectomy  3   L2-3 and L3-4 posterior decompression with bilateral laminectomy and medial facetectomy  4  L5-S1 bilateral laminoforaminotomy for decompression  Specimen(s):  * No specimens in log *    Estimated Blood Loss:   600 mL    Drains:  Closed/Suction Drain Inferior;Midline Back Bulb 7 Fr  (Active)   Number of days: 0       Urethral Catheter Double-lumen; Latex 16 Fr  (Active)   Output (mL) 1000 mL 23 1201   Number of days: 0       Anesthesia Type:   General    Operative Indications:  Spinal stenosis of lumbar region with neurogenic claudication [M48 062]  Lumbar spondylosis [M47 816]  Lumbar radiculopathy [M54 16]    Operative Findings:  Stable intraoperative electrophysiological monitoring  Severe central and bilateral foraminal stenosis at L1-2 and L4-5 secondary to degenerative changes    Moderate central and biforaminal stenosis at L2-3 and L3-4 secondary degenerative changes  Severe by foraminal stenosis at L5-S1 secondary to facet ligamentous hypertrophy  Complications:   None    Procedure and Technique:  Clinical Note:    The goals and alternatives to the procedure above were discussed with patient and family  Surgery is intended to decompress neural structures and hopefully improve radicular pain  Weakness, numbness and back pain are less likely to improve  The risks of surgery were described in detail  1  Risk of general anesthetic, with possible cardiac and respiratory complication  There is risk of infection and bleeding  2  Risk of neurological injury with new pain, weakness or numbness or difficulties with bowel and bladder function  The risk of CSF leak was described  3  Possible need for revision surgery in the future  Once all questions were answered to their satisfaction, they asked to proceed with surgery  Operating Room Note    The patient was brought to the operating room and placed under general anesthetic  Once all appropriate lines were in position, the patient was carefully turned in the prone position onto a Richie frame  Care was taken to insure that all pressure points were padded and the neck was in a neutral position  Based on external anatomic markers a 20 centimeter midline posterior spine incision was planned  The skin was then prepped and draped in usual sterile fashion  A full surgical timeout was undertaken identifying the site, side and level of surgery  The patient received preoperative antibiotics  One percent lidocaine with 100,000 of epinephrine was used to infiltrate the soft tissue  10 blade was used to incise the skin  Monopolar cautery was used to dissect down and through the muscle fascia  Subperiosteal dissection was undertaken along the spinous processes, lamina and pars bilaterally using monopolar cautery and aquamantis  Care was taken not to disrupt the facet joint capsules    The navigation probe was then affixed to the S1 spinous process  The O arm was then used to obtain intraoperative navigation imaging  The surgical level was then confirmed with OR staff  Using navigation, a matchstick papa was used to drill a  hole at the midpoint of the transverse process lateral to the pars on the right at L3  A navigated tap was then used to prepare the trajectory through the pedicle  A 6 5 x 50 millimeter pedicle screw was then placed under lateral fluoroscopic guidance  In a similar fashion, 5 5 x 45 mm screws were placed bilaterally at T12, L1 and L2, well 6 5 x 50 mm screws were placed bilaterally at L3 and L4, 6 5 x 45 mm screws were placed bilaterally at L5 and 7 5 x 45 mm screws were placed bilaterally at S1  Electrophysiological monitoring remained stable  Satisfied with instrumentation, I turned my attention to decompression  Ball bur was used to drill troughs from the inferior half of the lamina of L1 to the superior half lamina of S1 bilaterally  Ignacia bone cutter was used to remove the spinous processes  Starting at L1-2, bilateral laminotomy and total facetectomy was undertaken with navigated matchstick bur, curved curette and Kerrison punch  Following decompression, I could pass a Rincon through both foramina and along the lateral recess and there is no further compression of neural structures  Navigation demonstrated adequate rostrocaudal decompression  In a similar fashion, L2-3 and L3-4 were decompressed though at these levels medial facetectomies were undertaken  Following decompression I could pass a ball hook along the lateral recess and there is no further compression of neural structures  I could easily pass a Rincon through both foramina  At L4-5 there is severe central and bilateral foraminal stenosis secondary to degenerative changes  Following decompression, I could pass a Rincon through both foramina and a ball hook along the lateral recess  Finally at L5-S1, navigated matchstick bur was used to perform a lamina foraminotomies bilaterally and medial facetectomies for decompression  Following decompression, a Donnis Money could be passed through both foramina  Navigation was used to reconfirm the rostral caudal extent of decompression  Electrophysiological monitoring main stable  200 milliimeter rods were placed bilaterally  Set screws were applied and there was adequate dorota above and below the construct bilaterally  The O arm was reintroduced into the OR to confirm appropriate positioning of all instrumentation and demonstrate adequate decompression  The set screws were finally tightened  The surgical site was irrigated copiously with antibiotic irrigation  The transverse processes and facet joints were decorticated with a matchstick drill  Locally harvested autograft and bone substitute were prepared in a bone mill and placed along the decorticated surface to promote bony fusion  An epidural KAYLEE drain was tunnelled to right of the incision  Vicryl suture was used to approxiate the fascia and muscle while inverted Vicryl was used to approximate the subcutaenous tissues  Stapler was used to approximate the skin edges  0 5% Marcaine with 100,000 epinephrine was used to infiltrate the soft tissue  Miplex dressing was applied  The count was correct at the end of the case and there were no complications  The patient was carefully transferred onto the operating gurney and extubated The patient was transferred to the recovery room where they were noted to be hemodynamically stable and neurologically unchanged  The results of surgery were discussed with patient and family      I was present for the entire procedure    Patient Disposition:  PACU         SIGNATURE: Dagoberto Alcantar MD  DATE: April 11, 2023  TIME: 2:31 PM

## 2023-04-11 NOTE — ASSESSMENT & PLAN NOTE
· Patient with a history of chronic lower back pain and radiculopathy secondary to lumbar spondylosis and stenosis  He follows with neurosurgery, Dr Luciana Navarro  He has had a spinal cord stimulator trial, which helped some but he continued with lower back pain  · Patient is postop day 0 status post T12-S1 fusion and drain placement   · admitted to ICU postoperative  · Acute pain service consulted, may require ketamine infusion for pain control  · Currently patient rating pain 10 out of 10    He has Dilaudid and oxy codon as needed  · Patient is for upright x-rays tonight  · To continue on vancomycin for 24 hours postop  · Starting cardiopulmonary monitoring  · Routine monitoring neurovascular checks  · PT OT ordered

## 2023-04-12 PROBLEM — R33.9 URINARY RETENTION: Status: ACTIVE | Noted: 2023-04-12

## 2023-04-12 LAB
ALBUMIN SERPL BCP-MCNC: 3.5 G/DL (ref 3.5–5)
ALP SERPL-CCNC: 63 U/L (ref 34–104)
ALT SERPL W P-5'-P-CCNC: 16 U/L (ref 7–52)
ANION GAP SERPL CALCULATED.3IONS-SCNC: 5 MMOL/L (ref 4–13)
AST SERPL W P-5'-P-CCNC: 31 U/L (ref 13–39)
BILIRUB SERPL-MCNC: 0.58 MG/DL (ref 0.2–1)
BUN SERPL-MCNC: 11 MG/DL (ref 5–25)
CALCIUM SERPL-MCNC: 8.5 MG/DL (ref 8.4–10.2)
CHLORIDE SERPL-SCNC: 100 MMOL/L (ref 96–108)
CO2 SERPL-SCNC: 33 MMOL/L (ref 21–32)
CREAT SERPL-MCNC: 0.73 MG/DL (ref 0.6–1.3)
ERYTHROCYTE [DISTWIDTH] IN BLOOD BY AUTOMATED COUNT: 16 % (ref 11.6–15.1)
GFR SERPL CREATININE-BSD FRML MDRD: 97 ML/MIN/1.73SQ M
GLUCOSE SERPL-MCNC: 141 MG/DL (ref 65–140)
GLUCOSE SERPL-MCNC: 150 MG/DL (ref 65–140)
GLUCOSE SERPL-MCNC: 169 MG/DL (ref 65–140)
GLUCOSE SERPL-MCNC: 169 MG/DL (ref 65–140)
GLUCOSE SERPL-MCNC: 179 MG/DL (ref 65–140)
HCT VFR BLD AUTO: 28.8 % (ref 36.5–49.3)
HGB BLD-MCNC: 9.3 G/DL (ref 12–17)
MCH RBC QN AUTO: 27.8 PG (ref 26.8–34.3)
MCHC RBC AUTO-ENTMCNC: 32.3 G/DL (ref 31.4–37.4)
MCV RBC AUTO: 86 FL (ref 82–98)
PLATELET # BLD AUTO: 304 THOUSANDS/UL (ref 149–390)
PMV BLD AUTO: 10.2 FL (ref 8.9–12.7)
POTASSIUM SERPL-SCNC: 3.3 MMOL/L (ref 3.5–5.3)
PROT SERPL-MCNC: 6.2 G/DL (ref 6.4–8.4)
RBC # BLD AUTO: 3.35 MILLION/UL (ref 3.88–5.62)
SODIUM SERPL-SCNC: 138 MMOL/L (ref 135–147)
WBC # BLD AUTO: 14.52 THOUSAND/UL (ref 4.31–10.16)

## 2023-04-12 RX ORDER — TAMSULOSIN HYDROCHLORIDE 0.4 MG/1
0.4 CAPSULE ORAL
Status: DISCONTINUED | OUTPATIENT
Start: 2023-04-12 | End: 2023-04-22 | Stop reason: HOSPADM

## 2023-04-12 RX ORDER — GLYCOPYRROLATE 0.2 MG/ML
0.2 INJECTION INTRAMUSCULAR; INTRAVENOUS EVERY 4 HOURS PRN
Status: DISCONTINUED | OUTPATIENT
Start: 2023-04-12 | End: 2023-04-17

## 2023-04-12 RX ORDER — LIDOCAINE 50 MG/G
2 PATCH TOPICAL DAILY
Status: DISCONTINUED | OUTPATIENT
Start: 2023-04-12 | End: 2023-04-22 | Stop reason: HOSPADM

## 2023-04-12 RX ORDER — HALOPERIDOL 5 MG/ML
2 INJECTION INTRAMUSCULAR
Status: DISCONTINUED | OUTPATIENT
Start: 2023-04-12 | End: 2023-04-17

## 2023-04-12 RX ORDER — POTASSIUM CHLORIDE 14.9 MG/ML
20 INJECTION INTRAVENOUS ONCE
Status: COMPLETED | OUTPATIENT
Start: 2023-04-12 | End: 2023-04-12

## 2023-04-12 RX ORDER — CLONAZEPAM 1 MG/1
1 TABLET ORAL 2 TIMES DAILY
Status: DISCONTINUED | OUTPATIENT
Start: 2023-04-12 | End: 2023-04-13

## 2023-04-12 RX ORDER — POTASSIUM CHLORIDE 20 MEQ/1
40 TABLET, EXTENDED RELEASE ORAL ONCE
Status: COMPLETED | OUTPATIENT
Start: 2023-04-12 | End: 2023-04-12

## 2023-04-12 RX ADMIN — FOLIC ACID 2000 MCG: 1 TABLET ORAL at 08:28

## 2023-04-12 RX ADMIN — LOSARTAN POTASSIUM 100 MG: 50 TABLET, FILM COATED ORAL at 08:28

## 2023-04-12 RX ADMIN — POTASSIUM CHLORIDE 20 MEQ: 14.9 INJECTION, SOLUTION INTRAVENOUS at 08:30

## 2023-04-12 RX ADMIN — HEPARIN SODIUM 5000 UNITS: 5000 INJECTION INTRAVENOUS; SUBCUTANEOUS at 17:25

## 2023-04-12 RX ADMIN — HEPARIN SODIUM 5000 UNITS: 5000 INJECTION INTRAVENOUS; SUBCUTANEOUS at 08:27

## 2023-04-12 RX ADMIN — OXYCODONE HYDROCHLORIDE 10 MG: 5 TABLET ORAL at 08:27

## 2023-04-12 RX ADMIN — EMPAGLIFLOZIN 10 MG: 10 TABLET, FILM COATED ORAL at 08:28

## 2023-04-12 RX ADMIN — MIRTAZAPINE 30 MG: 15 TABLET, FILM COATED ORAL at 21:48

## 2023-04-12 RX ADMIN — METHOCARBAMOL 500 MG: 500 TABLET ORAL at 11:08

## 2023-04-12 RX ADMIN — INSULIN LISPRO 1 UNITS: 100 INJECTION, SOLUTION INTRAVENOUS; SUBCUTANEOUS at 17:24

## 2023-04-12 RX ADMIN — GABAPENTIN 300 MG: 300 CAPSULE ORAL at 17:24

## 2023-04-12 RX ADMIN — INSULIN LISPRO 1 UNITS: 100 INJECTION, SOLUTION INTRAVENOUS; SUBCUTANEOUS at 08:29

## 2023-04-12 RX ADMIN — METHOCARBAMOL 500 MG: 500 TABLET ORAL at 06:17

## 2023-04-12 RX ADMIN — NIFEDIPINE 120 MG: 30 TABLET, FILM COATED, EXTENDED RELEASE ORAL at 08:28

## 2023-04-12 RX ADMIN — ACETAMINOPHEN 325MG 975 MG: 325 TABLET ORAL at 00:54

## 2023-04-12 RX ADMIN — OXYCODONE HYDROCHLORIDE 10 MG: 5 TABLET ORAL at 02:24

## 2023-04-12 RX ADMIN — SERTRALINE HYDROCHLORIDE 100 MG: 100 TABLET ORAL at 08:28

## 2023-04-12 RX ADMIN — POTASSIUM CHLORIDE 40 MEQ: 1500 TABLET, EXTENDED RELEASE ORAL at 08:29

## 2023-04-12 RX ADMIN — DOCUSATE SODIUM 100 MG: 100 CAPSULE, LIQUID FILLED ORAL at 17:24

## 2023-04-12 RX ADMIN — CLONAZEPAM 1 MG: 1 TABLET ORAL at 17:24

## 2023-04-12 RX ADMIN — METHOCARBAMOL 500 MG: 500 TABLET ORAL at 17:24

## 2023-04-12 RX ADMIN — DOCUSATE SODIUM 100 MG: 100 CAPSULE, LIQUID FILLED ORAL at 08:28

## 2023-04-12 RX ADMIN — OXYCODONE HYDROCHLORIDE 10 MG: 5 TABLET ORAL at 17:24

## 2023-04-12 RX ADMIN — ACETAMINOPHEN 325MG 975 MG: 325 TABLET ORAL at 08:30

## 2023-04-12 RX ADMIN — HYDROMORPHONE HYDROCHLORIDE 0.2 MG: 0.2 INJECTION, SOLUTION INTRAMUSCULAR; INTRAVENOUS; SUBCUTANEOUS at 15:58

## 2023-04-12 RX ADMIN — ACETAMINOPHEN 325MG 975 MG: 325 TABLET ORAL at 17:24

## 2023-04-12 RX ADMIN — KETAMINE HYDROCHLORIDE 0.1 MG/KG/HR: 10 INJECTION INTRAMUSCULAR; INTRAVENOUS at 10:37

## 2023-04-12 RX ADMIN — INSULIN LISPRO 1 UNITS: 100 INJECTION, SOLUTION INTRAVENOUS; SUBCUTANEOUS at 11:08

## 2023-04-12 RX ADMIN — TAMSULOSIN HYDROCHLORIDE 0.4 MG: 0.4 CAPSULE ORAL at 15:58

## 2023-04-12 RX ADMIN — HYDROMORPHONE HYDROCHLORIDE 0.2 MG: 0.2 INJECTION, SOLUTION INTRAMUSCULAR; INTRAVENOUS; SUBCUTANEOUS at 09:55

## 2023-04-12 RX ADMIN — OXYCODONE HYDROCHLORIDE 10 MG: 5 TABLET ORAL at 12:29

## 2023-04-12 RX ADMIN — HYDROCHLOROTHIAZIDE 25 MG: 25 TABLET ORAL at 08:28

## 2023-04-12 RX ADMIN — Medication 5000 UNITS: at 08:28

## 2023-04-12 RX ADMIN — GABAPENTIN 300 MG: 300 CAPSULE ORAL at 08:29

## 2023-04-12 RX ADMIN — SENNOSIDES 8.6 MG: 8.6 TABLET, FILM COATED ORAL at 08:28

## 2023-04-12 RX ADMIN — SODIUM CHLORIDE, SODIUM LACTATE, POTASSIUM CHLORIDE, AND CALCIUM CHLORIDE 125 ML/HR: .6; .31; .03; .02 INJECTION, SOLUTION INTRAVENOUS at 02:25

## 2023-04-12 RX ADMIN — CLONAZEPAM 1 MG: 1 TABLET ORAL at 10:37

## 2023-04-12 RX ADMIN — METOPROLOL SUCCINATE 75 MG: 50 TABLET, EXTENDED RELEASE ORAL at 08:28

## 2023-04-12 NOTE — PHYSICAL THERAPY NOTE
PHYSICAL THERAPY EVALUATION NOTE    Patient Name: Lisa Hernandez  OYXFM'D Date: 2023    AGE:   74 y o  Mrn:   [id number]  ADMIT DX:  Spinal stenosis of lumbar region with neurogenic claudication [M48 062]  Lumbar spondylosis [M47 816]  Lumbar radiculopathy [M54 16]    Past Medical History:   Diagnosis Date    Anxiety     Arthritis     Cancer ([facility] )     Depression     Diabetes mellitus ([facility] )     Hypertension     Liver disease     Mitral valve prolapse     PONV (postoperative nausea and vomiting)     Thyroid disease      Length Of Stay: 1  PHYSICAL THERAPY EVALUATION :   Patient's identity confirmed via 2 patient identifiers (full name and ) at start of session         23 1149   PT Last Visit   PT Visit Date 23   Note Type   Note type Evaluation   Pain Assessment   Pain Assessment Tool 0-10   Pain Score 8   Pain Location/Orientation Location: Back   Pain Onset/Description Onset: Ongoing  (radiates to waist)   Effect of Pain on Daily Activities limits activity tolerance, quality of movement   Patient's Stated Pain Goal 3   Hospital Pain Intervention(s) Medication (See MAR); Cold applied;Repositioned; Ambulation/increased activity  (pt on ketamine drip)   Restrictions/Precautions   Weight Bearing Precautions Per Order No   Other Precautions Chair Alarm; Bed Alarm;Multiple lines;Telemetry; Fall Risk;Pain   Home Living   Type of [address] [place] [address] One level;Stairs to enter with rails  (1 CECILIO)   Home Equipment Walker;Cane   Additional Comments Pt reports using both SPC or RW PTA   Prior Function   Level of [place] Independent with functional mobility; Needs assistance with ADLs   Lives With Spouse   Receives Help From [facility] in the last 6 months 1 to 4  (3)   General   Additional Pertinent History Pt is POD1 s/p T12-S1 decompression and fusion   Pt s/p APS consult this AM and started on a ketamine drip Family/Caregiver Present No   Cognition   Overall Cognitive Status WFL   Arousal/Participation Alert   Orientation Level Oriented X4   Memory Within functional limits   Following Commands Follows multistep commands with increased time or repetition   Comments Pt pleasant and agreeable throughout session   RLE Assessment   RLE Assessment WFL   Strength RLE   RLE Overall Strength 4-/5   LLE Assessment   LLE Assessment WFL   Strength LLE   LLE Overall Strength 4-/5   Bed Mobility   Supine to Sit Unable to assess   Transfers   Sit to Stand 3  Moderate assistance   Additional items Assist x 2   Stand to Sit 3  Moderate assistance   Additional items Assist x 2   Ambulation/Elevation   Gait pattern Improper Weight shift;Decreased foot clearance; Short stride   Gait Assistance 3  Moderate assist   Additional items Assist x 1   Assistive Device Rolling walker   Distance step forward, back  (additional not possible due to significant pain)   Balance   Static Sitting Poor +   Static Standing Poor   Ambulatory Poor   Activity Tolerance   Activity Tolerance Patient limited by pain   Medical Staff Made Aware OT [place]   Nurse Made Aware Spoke to NATE Frederick   Assessment   Problem List Decreased strength; Impaired balance;Decreased endurance;Decreased mobility;Pain   Assessment Kimber George is a 74 y o  Male who presents to [address] [place] [address] on 4/11/2023 from home for elective T12-S1 decompression and fusion w/ pre-op dx of spinal stenosis  Orders for PT eval and treat received  Pt presents w/ comorbidities of alcoholic cirrhosis, DMII, CPS  At baseline, pt mobilizes mod I w/ RW vs SPC, and reports 3 falls in the last 6 months  Upon evaluation, pt presents w/ the following deficits: weakness, impaired balance, decreased endurance and pain limiting functional mobility  Upon eval, pt requires mod A x 2 for transfers, and mod A x 1 for gait  PT evaluation recommendation is for Level II   During this admission, pt would benefit from continued skilled inpatient PT in the acute care setting in order to address the abovementioned deficits to maximize function and mobility before DC from acute care  Goals   Patient Goals to have less pain, be able to move better   STG Expiration Date 04/22/23   Short Term Goal #1 Patient will: Perform all bed mobility tasks w/ minx1 to improve pt's independence w/ repositioning for decrease risk of skin breakdown, Perform all transfers w/ minx1 consistently from various height surfaces in order to improve I w/ engagement w/ real-world environments/situations, Ambulate at least 25 ft  with roller walker w/ minx1 w/o LOB to facilitate return and engagement w/ previous living environment, Navigate 1 stairs w/ minx1 with unilateral handrail to either improve independence w/ entering home and/or so patient can fully access living areas in home and Increase all balance 1/2 grade to decrease risk for falls   PT Treatment Day 0   Plan   Treatment/Interventions Functional transfer training;LE strengthening/ROM; Elevations; Therapeutic exercise; Endurance training;Patient/family training;Equipment eval/education; Bed mobility;Gait training   PT Frequency 3-5x/wk   Recommendation   [facility] Rehab Discharge Recommendation (PT/OT) Level 2   Equipment Recommended Walker; Wheelchair   AM-PAC Basic Mobility Inpatient   Turning in Flat Bed Without Bedrails 2   Lying on Back to Sitting on Edge of Flat Bed Without Bedrails 1   Moving Bed to Chair 2   Standing Up From Chair Using Arms 1   Walk in Room 2   Climb 3-5 Stairs With Railing 1   Basic Mobility Inpatient Raw Score 9   Turning Head Towards Sound 4   Follow Simple Instructions 3   Low Function Basic Mobility Raw Score  16   Low Function Basic Mobility Standardized Score  25 72   Highest Level Of Mobility   JH-HLM Goal 3: Sit at edge of bed   JH-HLM Achieved 5: Stand (1 or more minutes)   Additional Treatment Session   Start Time 1210   End Time 1218   Treatment Assessment Pt seated OOB in recliner chair  Pt participated in scooting backwards in chair w/ mod A x 2 for each scoot  Spent significant time assisting pt w/ getting comfortable and providing education on use of lumbar towel roll, ice packs  Pt seated OOB in recliner chair at end of session w/ Maral sultana, NATE Vera present in room upon therapist's exit   Additional Treatment Day 1   End of Consult   Patient Position at End of Consult Bedside chair;Bed/Chair alarm activated; All needs within reach       Pt would benefit from skilled inpatient PT during this admission in order to facilitate progress towards goals to maximize functional independence    Wilder Vlea, PT, DPT

## 2023-04-12 NOTE — PLAN OF CARE
Problem: Prexisting or High Potential for Compromised Skin Integrity  Goal: Skin integrity is maintained or improved  Description: INTERVENTIONS:  - Identify patients at risk for skin breakdown  - Assess and monitor skin integrity  - Assess and monitor nutrition and hydration status  - Monitor labs   - Assess for incontinence   - Turn and reposition patient  - Assist with mobility/ambulation  - Relieve pressure over bony prominences  - Avoid friction and shearing  - Provide appropriate hygiene as needed including keeping skin clean and dry  - Evaluate need for skin moisturizer/barrier cream  - Collaborate with interdisciplinary team   - Patient/family teaching  - Consider wound care consult   Outcome: Progressing     Problem: MOBILITY - ADULT  Goal: Maintain or return to baseline ADL function  Description: INTERVENTIONS:  -  Assess patient's ability to carry out ADLs; assess patient's baseline for ADL function and identify physical deficits which impact ability to perform ADLs (bathing, care of mouth/teeth, toileting, grooming, dressing, etc )  - Assess/evaluate cause of self-care deficits   - Assess range of motion  - Assess patient's mobility; develop plan if impaired  - Assess patient's need for assistive devices and provide as appropriate  - Encourage maximum independence but intervene and supervise when necessary  - Involve family in performance of ADLs  - Assess for home care needs following discharge   - Consider OT consult to assist with ADL evaluation and planning for discharge  - Provide patient education as appropriate  Outcome: Progressing  Goal: Maintains/Returns to pre admission functional level  Description: INTERVENTIONS:  - Perform BMAT or MOVE assessment daily    - Set and communicate daily mobility goal to care team and patient/family/caregiver     - Collaborate with rehabilitation services on mobility goals if consulted  - Out of bed for toileting  - Record patient progress and toleration of activity level   Outcome: Progressing     Problem: PAIN - ADULT  Goal: Verbalizes/displays adequate comfort level or baseline comfort level  Description: Interventions:  - Encourage patient to monitor pain and request assistance  - Assess pain using appropriate pain scale  - Administer analgesics based on type and severity of pain and evaluate response  - Implement non-pharmacological measures as appropriate and evaluate response  - Consider cultural and social influences on pain and pain management  - Notify physician/advanced practitioner if interventions unsuccessful or patient reports new pain  Outcome: Progressing     Problem: INFECTION - ADULT  Goal: Absence or prevention of progression during hospitalization  Description: INTERVENTIONS:  - Assess and monitor for signs and symptoms of infection  - Monitor lab/diagnostic results  - Monitor all insertion sites, i e  indwelling lines, tubes, and drains  - Monitor endotracheal if appropriate and nasal secretions for changes in amount and color  - [place] appropriate cooling/warming therapies per order  - Administer medications as ordered  - Instruct and encourage patient and family to use good hand hygiene technique  - Identify and instruct in appropriate isolation precautions for identified infection/condition  Outcome: Progressing  Goal: Absence of fever/infection during neutropenic period  Description: INTERVENTIONS:  - Monitor WBC    Outcome: Progressing     Problem: SAFETY ADULT  Goal: Maintain or return to baseline ADL function  Description: INTERVENTIONS:  -  Assess patient's ability to carry out ADLs; assess patient's baseline for ADL function and identify physical deficits which impact ability to perform ADLs (bathing, care of mouth/teeth, toileting, grooming, dressing, etc )  - Assess/evaluate cause of self-care deficits   - Assess range of motion  - Assess patient's mobility; develop plan if impaired  - Assess patient's need for assistive devices and provide as appropriate  - Encourage maximum independence but intervene and supervise when necessary  - Involve family in performance of ADLs  - Assess for home care needs following discharge   - Consider OT consult to assist with ADL evaluation and planning for discharge  - Provide patient education as appropriate  Outcome: Progressing  Goal: Maintains/Returns to pre admission functional level  Description: INTERVENTIONS:  - Perform BMAT or MOVE assessment daily    - Set and communicate daily mobility goal to care team and patient/family/caregiver     - Collaborate with rehabilitation services on mobility goals if consulted  - Out of bed for toileting  - Record patient progress and toleration of activity level   Outcome: Progressing  Goal: Patient will remain free of falls  Description: INTERVENTIONS:  - Educate patient/family on patient safety including physical limitations  - Instruct patient to call for assistance with activity   - Consult OT/PT to assist with strengthening/mobility   - Keep Call bell within reach  - Keep bed low and locked with side rails adjusted as appropriate  - Keep care items and personal belongings within reach  - Initiate and maintain comfort rounds  - Make Fall Risk Sign visible to staff  - Apply yellow socks and bracelet for high fall risk patients  - Consider moving patient to room near nurses station  Outcome: Progressing     Problem: DISCHARGE PLANNING  Goal: Discharge to home or other facility with appropriate resources  Description: INTERVENTIONS:  - Identify barriers to discharge w/patient and caregiver  - Arrange for needed discharge resources and transportation as appropriate  - Identify discharge learning needs (meds, wound care, etc )  - Arrange for interpretive services to assist at discharge as needed  - Refer to Case Management Department for coordinating discharge planning if the patient needs post-hospital services based on physician/advanced practitioner order or complex needs related to functional status, cognitive ability, or social support system  Outcome: Progressing     Problem: Knowledge Deficit  Goal: Patient/family/caregiver demonstrates understanding of disease process, treatment plan, medications, and discharge instructions  Description: Complete learning assessment and assess knowledge base    Interventions:  - Provide teaching at level of understanding  - Provide teaching via preferred learning methods  Outcome: Progressing

## 2023-04-12 NOTE — PROGRESS NOTES
[place]  Progress Note  Name: Ric Rodas  MRN: [id number]  Unit/Bed#: -01 SDU I Date of Admission: 4/11/2023   Date of Service: 4/12/2023 I Hospital Day: 1    Assessment/Plan   * Spinal stenosis of lumbar region with neurogenic claudication  Assessment & Plan  · Patient with a history of chronic lower back pain and radiculopathy secondary to lumbar spondylosis and stenosis  He follows with neurosurgery, Dr Sapphire Wood  He has had a spinal cord stimulator trial, which helped some but he continued with lower back pain  · Patient is postop day 1 status post T12-S1 fusion and drain placement   · admitted to ICU postoperative  · Acute pain service consulted, may require ketamine infusion for pain control  · Currently patient rating pain 10 out of 10    He has Dilaudid and oxy codon as needed  · Patient is for upright x-rays tonight  · To continue on vancomycin for 24 hours postop  · Starting cardiopulmonary monitoring  · Routine monitoring neurovascular checks  · PT OT ordered    Lumbar spondylosis  Assessment & Plan  See plan above    Urinary retention  Assessment & Plan  · Post op urinary retention  · Straight cathed x1 for 650cc  · Retention protocol    PONV (postoperative nausea and vomiting)  Assessment & Plan  · Zofran PRN    DM (diabetes mellitus) ([facility] )  Assessment & Plan  Lab Results   Component Value Date    HGBA1C 6 3 (H) 03/14/2023       Recent Labs     04/11/23  0645 04/11/23  1515 04/11/23  1706 04/11/23  2129   POCGLU 251* 229* 213* 231*       Blood Sugar Average: Last 72 hrs:  (P) 231   · Home regiment: jardiance, lantus and glipizide  · Starting SSI ACHS here and jardiance tomorrow  · Carb controlled diet    Cirrhosis, alcoholic ([facility] [address] )  Assessment & Plan  · LFTs wnl in march  · Check LFTs in AM    Benzodiazepine dependence ([facility] [address] )  Assessment & Plan  · Holding home klonopin in the setting of acute pain  · Consider restarting tomorrow         ICU Core Measures     A: Assess, Prevent, and Manage Pain · Has pain been assessed? Yes  · Need for changes to pain regimen? Yes   B: Both SAT/SAT  · N/A   C: Choice of Sedation · N/A   D: Delirium · CAM-ICU: Negative   E: Early Mobility  · Plan for early mobility? Yes   F: Family Engagement · Plan for family engagement today? Yes       Review of Invasive Devices:            Prophylaxis:  VTE VTE covered by:  heparin (porcine), Subcutaneous       Stress Ulcer  not ordered       Subjective   HPI:  74 y o  M POD 1 s/p T12-S1 fusion and drain placement  24hr events:   · Straight catheterized x1 for 650cc  · Complains of 10/10 pain but patient admits pain is partial due to chronic pain  · APS tele consult for this morning    Review of Systems   Constitutional: Negative  HENT: Negative  Eyes: Negative  Respiratory: Negative  Cardiovascular: Negative  Gastrointestinal: Negative  Genitourinary:        Urinary retention   Musculoskeletal: Negative  Skin: Negative  Allergic/Immunologic: Negative  Neurological: Negative  Hematological: Negative  Psychiatric/Behavioral: Negative  Objective                            Vitals I/O      Most Recent Min/Max in 24hrs   Temp 98 4 °F (36 9 °C) Temp  Min: 97 6 °F (36 4 °C)  Max: 98 4 °F (36 9 °C)   Pulse 97 Pulse  Min: 76  Max: 101   Resp (!) 28 Resp  Min: 6  Max: 30   /86 BP  Min: 124/58  Max: 189/76   O2 Sat 98 % SpO2  Min: 93 %  Max: 100 %      Intake/Output Summary (Last 24 hours) at 4/12/2023 0356  Last data filed at 4/12/2023 0304  Gross per 24 hour   Intake 4147 92 ml   Output 3280 ml   Net 867 92 ml         Diet Aj/CHO Controlled; Consistent Carbohydrate Diet Level 2 (5 carb servings/75 grams CHO/meal)     Invasive Monitoring Physical exam   N/A Physical Exam  Constitutional:       General: He is not in acute distress  Appearance: He is not ill-appearing  HENT:      Head: Normocephalic and atraumatic        Right Ear: External ear normal       Left Ear: External ear normal       Nose: Nose normal       Mouth/Throat:      Mouth: Mucous membranes are moist       Pharynx: Oropharynx is clear  Eyes:      Pupils: Pupils are equal, round, and reactive to light  Cardiovascular:      Rate and Rhythm: Normal rate and regular rhythm  Pulses: Normal pulses  Pulmonary:      Effort: Pulmonary effort is normal       Breath sounds: Normal breath sounds  Abdominal:      General: Bowel sounds are normal  There is no distension  Palpations: Abdomen is soft  Tenderness: There is no abdominal tenderness  Musculoskeletal:         General: Normal range of motion  Cervical back: Normal range of motion  Skin:     General: Skin is warm and dry  Capillary Refill: Capillary refill takes less than 2 seconds  Comments: KAYLEE drain - dark bloody drainage   Neurological:      General: No focal deficit present  Mental Status: He is alert and oriented to person, place, and time  Diagnostic Studies      EKG: Sinus rhythm  Imaging: I have personally reviewed pertinent reports     and I have personally reviewed pertinent films in PACS     Medications:  Scheduled PRN   acetaminophen, 975 mg, Q8H [facility]  cholecalciferol, 5,000 Units, Daily  docusate sodium, 100 mg, BID  Empagliflozin, 10 mg, QAM  folic acid, 8,612 mcg, Daily  gabapentin, 300 mg, BID  heparin (porcine), 5,000 Units, Q8H [facility]  losartan, 100 mg, QAM   And  hydrochlorothiazide, 25 mg, QAM  insulin lispro, 1-5 Units, 4x Daily (AC & HS)  lidocaine, 2 patch, Daily  methocarbamol, 500 mg, Q6H CHRISTIE  metoprolol succinate, 75 mg, QAM  mirtazapine, 30 mg, HS  NIFEdipine, 120 mg, QAM  senna, 1 tablet, Daily  sertraline, 100 mg, QAM      HYDROmorphone, 0 2 mg, Q4H PRN  hydrOXYzine HCL, 50 mg, Q6H PRN  lactated ringers, 1,000 mL, Once PRN   And  lactated ringers, 1,000 mL, Once PRN  naloxone, 0 04 mg, Q1MIN PRN  ondansetron, 4 mg, Q6H PRN  oxyCODONE, 10 mg, Q4H PRN  oxyCODONE, 5 mg, Q4H PRN  sodium chloride, 1,000 mL, Once PRN   And  sodium chloride, 1,000 mL, Once PRN       Continuous    lactated ringers, 125 mL/hr, Last Rate: 125 mL/hr (04/12/23 0225)         Labs:    CBC    Recent Labs     04/11/23  1709 04/11/23  2136   HGB 10 6* 10 0*   HCT 33 8* 31 7*     --      BMP    No recent results    Coags    No recent results     Additional Electrolytes  No recent results       Blood Gas    No recent results  No recent results LFTs  No recent results    Infectious  No recent results  Glucose  No recent results            No critical care time      Tayler Raw, [address]

## 2023-04-12 NOTE — ASSESSMENT & PLAN NOTE
· Patient with a history of chronic lower back pain and radiculopathy secondary to lumbar spondylosis and stenosis  He follows with neurosurgery, Dr Sapphire Wood  He has had a spinal cord stimulator trial, which helped some but he continued with lower back pain  · Patient is postop day 1 status post T12-S1 fusion and drain placement   · admitted to ICU postoperative  · Acute pain service consulted, may require ketamine infusion for pain control  · Currently patient rating pain 10 out of 10    He has Dilaudid and oxy codon as needed  · Patient is for upright x-rays tonight  · To continue on vancomycin for 24 hours postop  · Starting cardiopulmonary monitoring  · Routine monitoring neurovascular checks  · PT OT ordered

## 2023-04-12 NOTE — TELEMEDICINE
REQUIRED DOCUMENTATION:     1  This service was provided via Telemedicine  2  Provider located at [address] [place] [address]  3  TeleMed provider: Britni Barreto MD   4  Identify all parties in room with patient during tele consult:  none  5  Patient was then informed that this was a Telemedicine visit and that the exam was being conducted confidentially over secure lines  My office door was closed  No one else was in the room  Patient acknowledged consent and understanding of privacy and security of the Telemedicine visit, and gave us permission to have the assistant stay in the room in order to assist with the history and to conduct the exam   I informed the patient that I have reviewed their record in Epic and presented the opportunity for them to ask any questions regarding the visit today  The patient agreed to participate             Consult Note- Acute Pain Service   Kate Brito 74 y o  male MRN: [id number]  Unit/Bed#: -01 SDU Encounter: [id number]               Assessment/Plan     Assessment:   Patient Active Problem List   Diagnosis   • Alcoholism in remission [facility])   • Benzodiazepine dependence ([address] )   • Bilateral adhesive capsulitis of shoulders   • Bronchitis, mucopurulent recurrent ([address] )   • Cirrhosis, alcoholic ([address] )   • Diabetic peripheral neuropathy ([address] )   • DM (diabetes mellitus) ([address] )   • Herniated nucleus pulposus of lumbosacral region   • Hypercholesterolemia   • Lumbar spondylosis   • Thyroid cancer (HCC)   • Liver disease   • Chronic pain syndrome   • Spinal stenosis of lumbar region with neurogenic claudication   • Lumbar radiculopathy   • Chronic bilateral low back pain without sciatica   • PONV (postoperative nausea and vomiting)   • Medical marijuana use   • Urinary retention      Kate Brito is a 74 y o  male with a past medical history significant for hepatosteatosis, bipolar 2 disorder, diabetes mellitus complicated by peripheral neuropathy, chronic pain syndrome in the setting of lumbar spinal stenosis with neurogenic claudication and lumbar radiculopathy status post spinal cord stimulator trial with chronic opioid dependence currently on tramadol 50 mg twice daily at home (follows with  [facility]), anxiety with benzodiazepine dependence ordered clonazepam 1 mg twice daily at home (follows with [facility]) who presents for surgical management of lumbar spondylosis with spinal stenosis  Patient underwent T12-S1 posterior instrumented fixation fusion, L1-L2 and L4-L5 posterior decompression with bilateral laminectomy and total facetectomy, L2-L3 and L3-L4 posterior decompression with bilateral laminectomy and medial facetectomy, L5-S1 bilateral laminoforaminotomy for decompression on 4/11/2023  Acute pain service has been consulted for postoperative pain management  On evaluation, patient reports back pain that wraps around to waist and he describes this is pressure and achy  He states that this limits his ability to sleep  He states that oxycodone is effective for 2 to 3 hours for his back pain however does not touch his pain that wraps around to his waist   He also finds intravenous hydromorphone to not be helpful  We discussed the risk and benefits of ketamine infusion, especially with his hypertension and history of bipolar 2 disorder but he would like to move forward with this  In terms of substance use history, patient states that he is prescribed tramadol and clonazepam which he takes each twice daily  He states that he used to have a problem with alcohol and smoking cigarettes but he states that he quit on his own in 0  He does state that he has 1-2 beers occasionally with his brother but states he is only had alcohol one of the last 30 days  He also has a medical marijuana card however states he does not use this because he did not find this effective for his pain control      Plan:   Start ketamine infusion at 0 1 mg/kg/hr with as needed glycopyrrolate and haloperidol (confirmed medication availability with pharmacy)  Recommend restarting home clonazepam to avoid benzodiazepine withdrawal (1 mg po BID)  Continue acetaminophen 975 mg p o  every 8 hours scheduled  Continue oxycodone 5 mg p o  every 4 hours as needed for moderate pain  Continue oxycodone 10 mg p o  every 4 hours as needed for severe pain  Continue hydromorphone 0 2 mg IV every 4 hours as needed for breakthrough pain  Continue gabapentin 300 mg p o  twice daily  Continue methocarbamol 500 mg p o  every 6 hours scheduled  Continue lidocaine patch x2 to the bilateral paraspinal lumbar region 12 hours on/12 hours off  Continue naloxone as needed for respiratory depression/opioid reversal  Bowel regimen per primary team to avoid opioid-induced constipation    APS will continue to follow  Please contact Acute Pain Service - B via [employer] from [phone] with additional questions or concerns  See Stella or Jeannie for additional contacts and after hours information  History of Present Illness    Admit Date:  4/11/2023  Hospital Day:  1 day  Primary Service:  Neurosurgery  Attending Provider:  Libertad Torres MD  Reason for Consult / Principal Problem: Postoperative pain management  HPI: Frederic Fontanez is a 74 y o  male with a past medical history significant for hepatosteatosis, diabetes mellitus complicated by peripheral neuropathy, chronic pain syndrome in the setting of lumbar spinal stenosis with neurogenic claudication and lumbar radiculopathy status post spinal cord stimulator trial with chronic opioid dependence currently on tramadol 50 mg twice daily at home (follows with [facility]), anxiety with benzodiazepine dependence ordered clonazepam 1 mg twice daily at home (follows with [facility]) who presents for surgical management of lumbar spondylosis with spinal stenosis    Patient underwent T12-S1 posterior instrumented fixation fusion, L1-L2 and L4-L5 posterior decompression with bilateral laminectomy and total facetectomy, L2-L3 and L3-L4 posterior decompression with bilateral laminectomy and medial facetectomy, L5-S1 bilateral laminoforaminotomy for decompression on 4/11/2023  Postoperatively, patient has remained hypertensive and tachycardic at times  He is not requiring supplemental oxygen  He is remained afebrile  Most recent laboratory studies significant for creatinine 0 73 with EGFR 97, AST 31, ALT 16, alkaline phosphatase 63, albumin 3 5, total bilirubin 0 58, WBC 14 52, hemoglobin 9 3 with MCV 86, platelets 789  Current pain location(s): Low to mid back radiating around to bilateral anterior hips  Pain Scale:   10  Quality: Pressure, achy  Current Analgesic regimen:    Acetaminophen 975 mg p o  every 8 hours scheduled  Oxycodone 5 mg p o  every 4 hours as needed for moderate pain  Oxycodone 10 mg p o  every 4 hours as needed for severe pain  Hydromorphone 0 2 mg IV every 4 hours as needed for breakthrough pain  Gabapentin 300 mg p o  twice daily  Methocarbamol 500 mg every 6 hours scheduled  Lidocaine patch x2 to the bilateral paraspinal lumbar area 12 hours on/12 hours off    Pain History: Patient follows with [facility]  He has a history of chronic back pain requiring chronic opioids and is currently on tramadol 50 mg twice daily at home  He has previously tried spinal stimulator trial     I have reviewed the patient's controlled substance dispensing history in the Prescription Drug Monitoring Program in compliance with the [facility] regulations before prescribing any controlled substances       Filled  Written  Sold  ID  Drug  QTY  Days  Prescriber  RX #  Dispenser  Refill  Daily Dose*  Pymt Type       04/06/2023 03/20/2023   2  Clonazepam 1 Mg Tablet 60 00  30  Al Raoul  [id number]   Pro (6511)   4 00 LME  [place]  [place]     03/13/2023 02/08/2023   2  Tramadol Hcl 50 Mg Tablet 60 00  30  Da Gor  [id number]   Pro (6511)   10 00 MME  Private Pay  [place]     02/28/2023  10/24/2022   2  Clonazepam 1 Mg Tablet 60 00  30  Al Raoul [id number]   Pro (6511)   4 00 LME  [place]are [place]     02/08/2023 02/08/2023   2  Tramadol Hcl 50 Mg Tablet 60 00  30  Da Gor  [id number]   Pro (6511)   10 00 MME  Private Pay  [place]     01/25/2023  10/24/2022   2  Clonazepam 1 Mg Tablet 60 00  30  Al Raoul  [id number]   Pro (6511)   4 00 LME  [place]are [place]     12/24/2022  10/24/2022   2  Clonazepam 1 Mg Tablet 60 00  30  Al Raoul  [id number]   Pro (6511)   4 00 LME  [place]are [place]     11/23/2022  10/24/2022   2  Clonazepam 1 Mg Tablet 60 00  30  Al Rauol  [id number]   Pro (6511)   4 00 LME  [place]icare [place]     10/24/2022  10/24/2022   2  Clonazepam 1 Mg Tablet 60 00  30  Al Raoul  [id number]   Pro (6511)   4 00 LME  [place]are [place]     09/23/2022 08/24/2022   2  Lorazepam 1 Mg Tablet 60 00  30  Al Raoul  [id number]   Pro (6511)   2 00 LME  [place]icare [place]     09/15/2022  09/15/2022   2  Pregabalin 50 Mg Capsule 90 00  32  Ni Mar  [id number]   Pro (6511)   0 94 LME  [place]are [place]     09/01/2022 09/01/2022   2  Pregabalin 50 Mg Capsule 30 00  30  Ni Mar  [id number]   Pro (6511)   0 34 LME  [place]are [place]     08/24/2022 08/24/2022   2  Lorazepam 1 Mg Tablet 60 00  30  Al Raoul  [id number]   Pro (6511)   2 00 LME  [place]are [place]     08/15/2022  08/15/2022   2  Oxycodone Hcl (Ir) 5 Mg Tablet 10 00  2  As Gya  [id number]   Pro (6511)   37 50 MME  Private [facility]     08/09/2022 08/09/2022   2  Clonazepam 1 Mg Tablet 60 00  30  Al Raoul  [id number]   Pro (6511)   4 00 LME  [place]are [place]     06/07/2022 12/28/2021   2  Clonazepam 1 Mg Tablet 60 00  30  Al Raoul  [id number]   Pro (6511)   4 00 LME  [facility]          Inpatient consult to Acute Pain Service  Consult performed by: Lenny Washington MD  Consult ordered by: Arlet Cueva MD          Review of Systems   Constitutional: Negative for appetite change, chills, diaphoresis, fatigue, fever and unexpected weight change  HENT: Negative for sore throat  Eyes: Negative for visual disturbance  Respiratory: Negative for cough, chest tightness, shortness of breath and wheezing  Cardiovascular: Negative for chest pain, palpitations and leg swelling  Gastrointestinal: Positive for nausea  Negative for abdominal distention, abdominal pain, blood in stool, constipation, diarrhea and vomiting  Genitourinary: Positive for difficulty urinating  Negative for flank pain and urgency  Musculoskeletal: Positive for back pain  Negative for arthralgias and myalgias  Skin: Negative for pallor and rash  Neurological: Positive for headaches  Negative for dizziness, weakness and light-headedness  Historical Information   Past Medical History:   Diagnosis Date   • Anxiety    • Arthritis    • Cancer ([place] [address] )    • Depression    • Diabetes mellitus ([place] [address] )    • Hypertension    • Liver disease    • Mitral valve prolapse    • PONV (postoperative nausea and vomiting)    • Thyroid disease      Past Surgical History:   Procedure Laterality Date   • INCISION AND DRAINAGE OF WOUND Left 2022    Procedure: INCISION AND DRAINAGE (I&D) EXTREMITY;  Surgeon: Adam Delgado DPM;  Location:  MAIN OR;  Service: Podiatry   • JOINT REPLACEMENT Left 2022    Left TSA   • NM ARTHRODESIS POSTERIOR/PSTLAT TQ 1NTRSPC LUMBAR Bilateral 2023    Procedure: L1-S1 navigated posterior decompression with instrumented fixation fusion;  Surgeon: Yanet Alas MD;  Location:  MAIN OR;  Service: Neurosurgery   • THYROID SURGERY      remove cancer     Social History   Social History     Substance and Sexual Activity   Alcohol Use Yes    Comment: Socially     Social History     Substance and Sexual Activity   Drug Use Yes   • Frequency: 7 0 times per week   • Types: Marijuana    Comment: Medical     Social History     Tobacco Use   Smoking Status Former   • Packs/day: 0 25   • Types: Cigarettes   • Quit date:    • Years since quittin 3   Smokeless Tobacco Never   Tobacco Comments    quit      Family History: History reviewed  No pertinent family history      Meds/Allergies   all current active meds have been reviewed    Allergies   Allergen Reactions   • Abilify [Aripiprazole] Tremor     Shaking     • Cephalexin Rash   • Molds [employer] Smuts Allergic Rhinitis   • Pregabalin Tremor     Lyrica - shaking feeling       Objective   Temp:  [97 8 °F (36 6 °C)-98 8 °F (37 1 °C)] 98 6 °F (37 °C)  HR:  [] 100  Resp:  [6-30] 22  BP: (124-189)/(58-91) 164/76    Intake/Output Summary (Last 24 hours) at 4/12/2023 0856  Last data filed at 4/12/2023 0716  Gross per 24 hour   Intake 5556 25 ml   Output 4585 ml   Net 971 25 ml       Physical Exam  Constitutional:       General: He is in acute distress (mild 2/2 pain)  Appearance: Normal appearance  He is not ill-appearing or toxic-appearing  HENT:      Head: Normocephalic and atraumatic  Pulmonary:      Effort: Pulmonary effort is normal       Comments: RA  Skin:     General: Skin is dry  Coloration: Skin is not jaundiced or pale  Neurological:      Mental Status: He is alert and oriented to person, place, and time  Psychiatric:         Mood and Affect: Mood normal          Behavior: Behavior normal          Lab Results: I have personally reviewed pertinent labs  Imaging Studies: I have personally reviewed pertinent reports  EKG, Pathology, and Other Studies: I have personally reviewed pertinent reports  Please note that the APS provides consultative services regarding pain management only  With the exception of ketamine and epidural infusions and except when indicated, final decisions regarding starting or changing doses of analgesic medications are at the discretion of the consulting service  Off hours consultation and/or medication management is generally not available      Tianna Hook MD  Acute Pain Service

## 2023-04-12 NOTE — PLAN OF CARE
Problem: OCCUPATIONAL THERAPY ADULT  Goal: Performs self-care activities at highest level of function for planned discharge setting  See evaluation for individualized goals  Description: Treatment Interventions: ADL retraining, Functional transfer training, Patient/family training, Compensatory technique education, Equipment evaluation/education          See flowsheet documentation for full assessment, interventions and recommendations  Note: Limitation: Decreased ADL status, Decreased self-care trans, Decreased high-level ADLs, Decreased endurance  Prognosis: Fair  Assessment: Pt is a 74 y o  male seen for OT evaluation at [facility], admitted 4/11/2023 w/ Spinal stenosis of lumbar region with neurogenic claudication  Pt now s/p T12-S1 lumbar fusion with decompression  Comorbidities affecting pt's functional performance at time of assessment include: Benzodiazepine dependence, DM, cirrhosis, Bilateral adhesive capsulitis of shoulders, etc (see chart)   Personal factors affecting pt at time of IE include:steps to enter environment, difficulty performing ADLS, difficulty performing IADLS  and decreased functional mobility  Prior to admission, pt was living with wife and kids in [facility]  Pt required assist with ADLS and IADLS, & required RW/cane PTA  Upon evaluation: Pt requires mod Ax 1-2  for functional mobility/transfers, sup-mod A for UB ADLs and mod-max A for LB ADLS 2* the following deficits impacting occupational performance: decreased strength, decreased balance, decreased tolerance, increased pain and orthopedic restrictions  Full objective findings from OT assessment regarding body systems outlined above  These impairments, as well as risk for falls  limit pt's ability to safely engage in all baseline areas of occupation and mobility   Pt to benefit from continued skilled OT tx while in the hospital to address deficits as defined above and maximize level of functional independence w ADL's and functional mobility  Occupational Performance areas to address include: bathing/shower, toilet hygiene, dressing and functional mobility  This evaluation required an extensive review of medical and/or therapy records and additional review of physical, cognitive and psychosocial history related to functional performance  Based upon functional performance deficits and assessments, this evaluation has been identified as a high complexity evaluation  The patient's raw score on the AM-PAC Daily Activity inpatient short form is 16, standardized score is 35 96, less than 39 4  Patients at this level are likely to benefit from DC to post-acute rehabilitation services  However please refer to therapist recommendation for discharge planning given other factors that may influence destination

## 2023-04-12 NOTE — UTILIZATION REVIEW
Initial Clinical Review    Elective IP surgical procedure  Age/Sex: 74 y o  male  Surgery Date: 4/11/2023  Procedure:  1  T12-S1 navigated posterior instrumented fixation fusion with [employer] Solera, locally harvested autograft and master graft  2   L1-L2 and L4-5 posterior decompression with bilateral laminectomy and total facetectomy  3   L2-3 and L3-4 posterior decompression with bilateral laminectomy and medial facetectomy  4  L5-S1 bilateral laminoforaminotomy for decompression  Anesthesia: General  Operative Findings:   Stable intraoperative electrophysiological monitoring  Severe central and bilateral foraminal stenosis at L1-2 and L4-5 secondary to degenerative changes  Moderate central and biforaminal stenosis at L2-3 and L3-4 secondary degenerative changes  Severe by foraminal stenosis at L5-S1 secondary to facet ligamentous hypertrophy  POD#1 Progress Note: Post op urinary retention  Straight cathed x1 for 650 cc  Back pain around the waist   Reports overall improvement in b/l leg pain and strength in legs  Grossly 5/5 power in lower extremities, overall seems to be improved compared to prior to surgery  Reports improved light touch sensation lower extremities  APS tele-consulted  Pt does admit to anxiety  Pt understands that the goal is to have a pain score of 6-7 out of 10  - ketamine gtt started  Acetaminophen, gabapentin, methocarbamol scheduled  Lidocaine patch  Continue oxy, dilaudid prn  Continue neurovascular checks  Telemetry  Upright x-rays tonight  Hep sq, SCDs  PT/OT  Repeat CBC in AM  Restart pta po meds  Accuchecks w/ ssi  Cons carb diet       Admission Orders: Date/Time/Statement:   Admission Orders (From admission, onward)     Ordered        04/11/23 0730  Inpatient Admission  Once                      Orders Placed This Encounter   Procedures   • Inpatient Admission     Standing Status:   Standing     Number of Occurrences:   1     Order Specific Question:   Level of Care Answer:   Critical Care [15]     Order Specific Question:   Estimated length of stay     Answer:   More than 2 Midnights     Order Specific Question:   Certification     Answer:   I certify that inpatient services are medically necessary for this patient for a duration of greater than two midnights  See H&P and MD Progress Notes for additional information about the patient's course of treatment       Vital Signs:   Date/Time Temp Pulse Resp BP MAP (mmHg) SpO2 Calculated FIO2 (%) - Nasal Cannula O2 Flow Rate (L/min) Nasal Cannula O2 Flow Rate (L/min) O2 Device Patient Position - Orthostatic VS   04/12/23 1030 98 3 °F (36 8 °C) 100 20 157/65 94 96 % -- -- -- None (Room air) Lying   04/12/23 0700 -- 101 13 164/76 109 89 % Abnormal  -- -- -- -- --   04/12/23 0600 -- 100 25 Abnormal  158/71 102 94 % -- -- -- -- --   04/12/23 0530 -- 99 16 172/81 Abnormal  117 96 % -- -- -- -- --   04/12/23 0400 98 8 °F (37 1 °C) 92 18 142/72 101 94 % -- -- -- None (Room air) Lying   04/12/23 0300 -- 97 28 Abnormal  154/86 112 98 % -- -- -- -- --   04/12/23 0046 -- 94 30 Abnormal  189/76 Abnormal  109 98 % -- -- -- -- --   04/12/23 0015 -- 101 25 Abnormal  174/72 Abnormal  103 97 % -- -- -- -- --   04/11/23 2345 98 4 °F (36 9 °C) 94 14 161/77 110 95 % -- -- -- None (Room air) Lying   04/11/23 2330 -- 97 21 149/78 106 98 % -- -- -- -- --   04/11/23 2315 -- 99 29 Abnormal  153/78 109 97 % -- -- -- -- --   04/11/23 1900 97 9 °F (36 6 °C) 92 13 163/82 116 97 % -- -- -- -- --   04/11/23 1745 -- 90 18 173/90 Abnormal  124 100 % -- -- -- None (Room air) --   04/11/23 1710 97 8 °F (36 6 °C) 89 12 178/91 Abnormal  128 99 % 28 -- 2 L/min Nasal cannula Lying   04/11/23 1630 -- 80 10 Abnormal  128/61 87 98 % -- 2 L/min -- -- --   04/11/23 1600 -- 78 14 165/72 103 98 % -- -- -- -- --   04/11/23 1552 -- 78 14 173/75 Abnormal   108 97 % 28 -- 2 L/min -- --   BP: Pt c/o pain not releived with fentany dilaudid to be given at 04/11/23 1552 04/11/23 1500 -- 76 6 Abnormal  130/64 92 100 % -- -- -- -- --   04/11/23 1449 98 4 °F (36 9 °C) 78 22 124/58 83 100 % -- 6 L/min -- Simple mask --   04/11/23 0639 97 6 °F (36 4 °C) 77 18 187/85 Abnormal  -- 98 % -- -- -- None (Room air) --       Pertinent Labs/Diagnostic Test Results:   XR spine lumbar 2 or 3 views injury   Final Result by Danielle Charlton MD (04/11 1639)      Fluoroscopic guidance provided for surgical procedure  Please refer to the separate procedure notes for additional details            XR lumbar spine 2 or 3 views    (Results Pending)         Results from last 7 days   Lab Units 04/12/23  0429 04/11/23 2136 04/11/23  1709   WBC Thousand/uL 14 52*  --   --    HEMOGLOBIN g/dL 9 3* 10 0* 10 6*   HEMATOCRIT % 28 8* 31 7* 33 8*   PLATELETS Thousands/uL 304  --  312     Results from last 7 days   Lab Units 04/12/23  0429   SODIUM mmol/L 138   POTASSIUM mmol/L 3 3*   CHLORIDE mmol/L 100   CO2 mmol/L 33*   ANION GAP mmol/L 5   BUN mg/dL 11   CREATININE mg/dL 0 73   EGFR ml/min/1 73sq m 97   CALCIUM mg/dL 8 5     Results from last 7 days   Lab Units 04/12/23  0429   AST U/L 31   ALT U/L 16   ALK PHOS U/L 63   TOTAL PROTEIN g/dL 6 2*   ALBUMIN g/dL 3 5   TOTAL BILIRUBIN mg/dL 0 58     Results from last 7 days   Lab Units 04/12/23  1029 04/12/23  0621 04/11/23 2129 04/11/23  1706 04/11/23  1515 04/11/23  0645   POC GLUCOSE mg/dl 179* 169* 231* 213* 229* 251*     Results from last 7 days   Lab Units 04/12/23  0429   GLUCOSE RANDOM mg/dL 150*     Scheduled Medications:  acetaminophen, 975 mg, Oral, Q8H CHRISTIE  cholecalciferol, 5,000 Units, Oral, Daily  clonazePAM, 1 mg, Oral, BID  docusate sodium, 100 mg, Oral, BID  Empagliflozin, 10 mg, Oral, QAM  folic acid, 9,982 mcg, Oral, Daily  gabapentin, 300 mg, Oral, BID  heparin (porcine), 5,000 Units, Subcutaneous, Q8H CHRISTIE  losartan, 100 mg, Oral, QAM   And  hydrochlorothiazide, 25 mg, Oral, QAM  insulin lispro, 1-5 Units, Subcutaneous, 4x Daily (AC & HS)  lidocaine, 2 patch, Topical, Daily  methocarbamol, 500 mg, Oral, Q6H [address]  metoprolol succinate, 75 mg, Oral, QAM  mirtazapine, 30 mg, Oral, HS  NIFEdipine, 120 mg, Oral, QAM  senna, 1 tablet, Oral, Daily  sertraline, 100 mg, Oral, QAM  tamsulosin, 0 4 mg, Oral, Daily With Dinner    Continuous IV Infusions:  ketamine, 0 1 mg/kg/hr, Intravenous, Continuous    PRN Meds:  glycopyrrolate, 0 2 mg, Intravenous, Q4H PRN  haloperidol lactate, 2 mg, Intramuscular, Q30 Min PRN  HYDROmorphone, 0 2 mg, Intravenous, Q4H PRN 4/11 x2, 4/12 x1  hydrOXYzine HCL, 50 mg, Oral, Q6H PRN 4/11 x1  lactated ringers, 1,000 mL, Intravenous, Once PRN  naloxone, 0 04 mg, Intravenous, Q1MIN PRN  ondansetron, 4 mg, Intravenous, Q6H PRN  oxyCODONE, 10 mg, Oral, Q4H PRN 4/11 x1, 4/12 x2  oxyCODONE, 5 mg, Oral, Q4H PRN  sodium chloride, 1,000 mL, Intravenous, Once PRN          Network Utilization Review Department  ATTENTION: Please call with any questions or concerns to [phone] and carefully listen to the prompts so that you are directed to the right person  All voicemails are confidential   Doris Moyer all requests for admission clinical reviews, approved or denied determinations and any other requests to dedicated fax number below belonging to the campus where the patient is receiving treatment   List of dedicated fax numbers for the Facilities:  [address] [place] [address] DENIALS (Administrative/Medical Necessity) [phone]   [address] [place] [address] (Maternity/NICU/Pediatrics) [phone]   [address] Kaylyn Cyr [phone]   [facility] [phone]   [place] [phone]   [facility] [address] [phone]

## 2023-04-12 NOTE — PROGRESS NOTES
"Progress Note - Neurosurgery   Josr Kirk 74 y o  male MRN: [id number]  Unit/Bed#: -01 SDU Encounter: [id number]    Assessment:  30-year-old gentleman postop day 1 extensive thoracolumbar decompression and fusion  Overall improvement in symptoms of radiculopathy neurogenic claudication  Expected postoperative pain  Upright plain films are stable  Plan:  1  Continue to monitor neurologic examination  2   Initiate heparin for DVT prophylaxis  SCDs in place  3   Mobilize with PT and OT  Disposition planning  4   Patient catheterized twice for urinary retention  Indicates normal sensation in perineal area and could feel the catheter being placed  Also admits to having some decreased urinary output prior to surgery  We will initiate Flomax  5   ABLA with drop of hemoglobin of 3 points  May be dilutional as well  Repeat CBC tomorrow a m  Patient appears to be asymptomatic  Goal to maintain hemoglobin greater than 8  Monitor drain output  Expected high-volume following extensive thoracolumbar decompression fusion  6   APS consult to be completed today  Patient does admit to anxiety  Appreciate commendations and consider altering medication to address some of his anxiety  Patient understands that the goal is to have a pain score of 6-7 out of 10   7   Some sliding scale for diabetes  Appreciate input from critical care medicine  Anticipate transfer to floor later today  VTE Prophylaxis: Sequential compression device (Venodyne)  and Heparin    Subjective/Objective   Chief Complaint: Back pain around the waist   Reports overall improvement in bilateral leg pain and strength in legs  Vitals: Blood pressure 164/76, pulse 100, temperature 98 6 °F (37 °C), temperature source Oral, resp  rate 22, height 5' 8\" (1 727 m), weight 89 5 kg (197 lb 5 oz), SpO2 95 %  ,Body mass index is 30 kg/m²  Hemodynamic Monitoring: None  Physical Exam:   Physical Exam  Vitals reviewed   " Constitutional:       General: He is not in acute distress  Eyes:      Extraocular Movements: Extraocular movements intact  Cardiovascular:      Rate and Rhythm: Normal rate and regular rhythm  Pulmonary:      Effort: Pulmonary effort is normal  No respiratory distress  Skin:     General: Skin is warm and dry  Neurological:      Mental Status: He is alert and oriented to person, place, and time  Comments: Grossly 5/5 power in lower extremities, overall seems to be improved compared to prior to surgery  Reports improved light touch sensation lower extremities  Psychiatric:         Mood and Affect: Mood normal          Behavior: Behavior normal        Neurologic Exam     Mental Status   Oriented to person, place, and time  Intake/Output                 04/12/23 0701 - 04/13/23 0700     [id number] Total              Intake    I V   1408 3  -- 1408 3    Total Intake 1408 3 -- 1408 3       Output    Drains  50  -- 50    Output (mL) (Closed/Suction Drain Inferior;Midline Back Bulb 7 Fr ) 50 -- 50    Total Output 50 -- 50       Net I/O     1358 3 -- 1358  3        Invasive Devices     Peripheral Intravenous Line  Duration           Peripheral IV 04/11/23 Dorsal (posterior); Right Hand 1 day    Peripheral IV 04/11/23 Dorsal (posterior); Left Hand <1 day          Drain  Duration           Closed/Suction Drain Inferior;Midline Back Bulb 7 Fr  <1 day              Lab Results:   I have personally reviewed pertinent results      Lab Results   Component Value Date    WBC 14 52 (H) 04/12/2023    HGB 9 3 (L) 04/12/2023    HCT 28 8 (L) 04/12/2023    MCV 86 04/12/2023     04/12/2023    MCH 27 8 04/12/2023    MCHC 32 3 04/12/2023    RDW 16 0 (H) 04/12/2023    MPV 10 2 04/12/2023    SODIUM 138 04/12/2023     04/12/2023    CO2 33 (H) 04/12/2023    BUN 11 04/12/2023    CREATININE 0 73 04/12/2023    CALCIUM 8 5 04/12/2023    AST 31 04/12/2023    ALT 16 04/12/2023    ALKPHOS 63 04/12/2023    EGFR 97 04/12/2023       Imaging Studies: I have personally reviewed pertinent reports  and I have personally reviewed pertinent films in PACS     Upright levels lumbar spine dated April 11, 2023  Interval T12-S1 posterior instrumented fixation  Overall stable lumbar alignment with some improvement in lordosis  No evidence of hardware loosening or failure  Other Studies: None

## 2023-04-12 NOTE — PLAN OF CARE
Problem: Prexisting or High Potential for Compromised Skin Integrity  Goal: Skin integrity is maintained or improved  Description: INTERVENTIONS:  - Identify patients at risk for skin breakdown  - Assess and monitor skin integrity  - Assess and monitor nutrition and hydration status  - Monitor labs   - Assess for incontinence   - Turn and reposition patient  - Assist with mobility/ambulation  - Relieve pressure over bony prominences  - Avoid friction and shearing  - Provide appropriate hygiene as needed including keeping skin clean and dry  - Evaluate need for skin moisturizer/barrier cream  - Collaborate with interdisciplinary team   - Patient/family teaching  - Consider wound care consult   Outcome: Progressing     Problem: MOBILITY - ADULT  Goal: Maintain or return to baseline ADL function  Description: INTERVENTIONS:  -  Assess patient's ability to carry out ADLs; assess patient's baseline for ADL function and identify physical deficits which impact ability to perform ADLs (bathing, care of mouth/teeth, toileting, grooming, dressing, etc )  - Assess/evaluate cause of self-care deficits   - Assess range of motion  - Assess patient's mobility; develop plan if impaired  - Assess patient's need for assistive devices and provide as appropriate  - Encourage maximum independence but intervene and supervise when necessary  - Involve family in performance of ADLs  - Assess for home care needs following discharge   - Consider OT consult to assist with ADL evaluation and planning for discharge  - Provide patient education as appropriate  Outcome: Progressing  Goal: Maintains/Returns to pre admission functional level  Description: INTERVENTIONS:  - Perform BMAT or MOVE assessment daily    - Set and communicate daily mobility goal to care team and patient/family/caregiver  - Collaborate with rehabilitation services on mobility goals if consulted  - Perform Range of Motion 3 times a day    - Reposition patient every 2 hours   - Dangle patient 3 times a day  - Stand patient 2 times a day  - Ambulate patient 2 times a day  - Out of bed to chair 2 times a day   - Out of bed for meals 2 times a day  - Out of bed for toileting  - Record patient progress and toleration of activity level   Outcome: Progressing     Problem: PAIN - ADULT  Goal: Verbalizes/displays adequate comfort level or baseline comfort level  Description: Interventions:  - Encourage patient to monitor pain and request assistance  - Assess pain using appropriate pain scale  - Administer analgesics based on type and severity of pain and evaluate response  - Implement non-pharmacological measures as appropriate and evaluate response  - Consider cultural and social influences on pain and pain management  - Notify physician/advanced practitioner if interventions unsuccessful or patient reports new pain  Outcome: Progressing     Problem: INFECTION - ADULT  Goal: Absence or prevention of progression during hospitalization  Description: INTERVENTIONS:  - Assess and monitor for signs and symptoms of infection  - Monitor lab/diagnostic results  - Monitor all insertion sites, i e  indwelling lines, tubes, and drains  - Monitor endotracheal if appropriate and nasal secretions for changes in amount and color  - [place] appropriate cooling/warming therapies per order  - Administer medications as ordered  - Instruct and encourage patient and family to use good hand hygiene technique  - Identify and instruct in appropriate isolation precautions for identified infection/condition  Outcome: Progressing  Goal: Absence of fever/infection during neutropenic period  Description: INTERVENTIONS:  - Monitor WBC    Outcome: Progressing     Problem: SAFETY ADULT  Goal: Maintain or return to baseline ADL function  Description: INTERVENTIONS:  -  Assess patient's ability to carry out ADLs; assess patient's baseline for ADL function and identify physical deficits which impact ability to perform ADLs (bathing, care of mouth/teeth, toileting, grooming, dressing, etc )  - Assess/evaluate cause of self-care deficits   - Assess range of motion  - Assess patient's mobility; develop plan if impaired  - Assess patient's need for assistive devices and provide as appropriate  - Encourage maximum independence but intervene and supervise when necessary  - Involve family in performance of ADLs  - Assess for home care needs following discharge   - Consider OT consult to assist with ADL evaluation and planning for discharge  - Provide patient education as appropriate  Outcome: Progressing  Goal: Maintains/Returns to pre admission functional level  Description: INTERVENTIONS:  - Perform BMAT or MOVE assessment daily    - Set and communicate daily mobility goal to care team and patient/family/caregiver  - Collaborate with rehabilitation services on mobility goals if consulted  - Perform Range of Motion 3 times a day  - Reposition patient every 2 hours    - Dangle patient 2 times a day  - Stand patient 2 times a day  - Ambulate patient 2 times a day  - Out of bed to chair 2 times a day   - Out of bed for meals 2 times a day  - Out of bed for toileting  - Record patient progress and toleration of activity level   Outcome: Progressing  Goal: Patient will remain free of falls  Description: INTERVENTIONS:  - Educate patient/family on patient safety including physical limitations  - Instruct patient to call for assistance with activity   - Consult OT/PT to assist with strengthening/mobility   - Keep Call bell within reach  - Keep bed low and locked with side rails adjusted as appropriate  - Keep care items and personal belongings within reach  - Initiate and maintain comfort rounds  - Make Fall Risk Sign visible to staff  - Offer Toileting every 2 Hours, in advance of need  - Initiate/Maintain bed alarm  - Apply yellow socks and bracelet for high fall risk patients  - Consider moving patient to room near nurses station  Outcome: Progressing     Problem: DISCHARGE PLANNING  Goal: Discharge to home or other facility with appropriate resources  Description: INTERVENTIONS:  - Identify barriers to discharge w/patient and caregiver  - Arrange for needed discharge resources and transportation as appropriate  - Identify discharge learning needs (meds, wound care, etc )  - Arrange for interpretive services to assist at discharge as needed  - Refer to Case Management Department for coordinating discharge planning if the patient needs post-hospital services based on physician/advanced practitioner order or complex needs related to functional status, cognitive ability, or social support system  Outcome: Progressing     Problem: Knowledge Deficit  Goal: Patient/family/caregiver demonstrates understanding of disease process, treatment plan, medications, and discharge instructions  Description: Complete learning assessment and assess knowledge base    Interventions:  - Provide teaching at level of understanding  - Provide teaching via preferred learning methods  Outcome: Progressing

## 2023-04-12 NOTE — CASE MANAGEMENT
Case Management Assessment & Discharge Planning Note    Patient name Enedelia England  Location  SDU/-01 S* MRN [id number]  : 1955 Date 2023       Current Admission Date: 2023  Current Admission Diagnosis:Spinal stenosis of lumbar region with neurogenic claudication   Patient Active Problem List    Diagnosis Date Noted   • Urinary retention 2023   • PONV (postoperative nausea and vomiting)    • Medical marijuana use    • Chronic bilateral low back pain without sciatica 2023   • Lumbar radiculopathy    • Spinal stenosis of lumbar region with neurogenic claudication 2023   • Chronic pain syndrome 2022   • Liver disease 08/10/2022   • Bronchitis, mucopurulent recurrent ([address] ) 2022   • Cirrhosis, alcoholic ([facility] )    • Diabetic peripheral neuropathy ([facility] ) 2022   • Herniated nucleus pulposus of lumbosacral region 2022   • Thyroid cancer ([facility] ) 2021   • Alcoholism in remission ([address] ) 2021   • Benzodiazepine dependence ([facility] ) 2021   • Bilateral adhesive capsulitis of shoulders 2021   • DM (diabetes mellitus) ([facility] ) 2021   • Hypercholesterolemia 2021   • Lumbar spondylosis 2021      LOS (days): 1  Geometric Mean LOS (GMLOS) (days): 2 80  Days to GMLOS:1 5     OBJECTIVE:    Risk of Unplanned Readmission Score: 31 46     Current admission status: Inpatient    Preferred Pharmacy:   [facility] [address], [address] [address]  Phone: [phone] Fax: [phone]    Primary Care Provider: Luli Alvarez DO    Primary Insurance: [place] [facility] REP  Secondary Insurance: Mirian JESUS [place]:  [address], [address] Representative - Spouse   Primary Phone: [phone] (Mobile)               Readmission Root Cause  30 Day Readmission: No    Patient Information  Admitted from[de-identified] Home  Mental Status: Alert  During Assessment patient was accompanied by: Not accompanied during assessment  Assessment information provided by[de-identified] Patient, Other - please comment (chart)  Primary Caregiver: Self  Support Systems: Spouse/significant other, Son, [facility] of Residence: [address] [place] [address] do you live in?: [facility] entry access options   Select all that apply : Stairs  Number of steps to enter home : 1  Type of Current Residence: [place]  In the last 12 months, was there a time when you were not able to pay the mortgage or rent on time?: No  In the last 12 months, how many places have you lived?: 1  In the last 12 months, was there a time when you did not have a steady place to sleep or slept in a shelter (including now)?: No  Homeless/housing insecurity resource given?: N/A  Living Arrangements: Lives w/ Spouse/significant other, Lives w/ Son  Is patient a ?: Yes    Activities of Daily Living Prior to Admission  Functional Status: Independent  Completes ADLs independently?: Yes  Ambulates independently?: Yes (ambulates with walker)  Does patient use assisted devices?: Yes  Assisted Devices (DME) used: Get [employer]  Does patient currently own DME?: Yes  What DME does the patient currently own?: Terris Angelucci, Shower Chair  Does patient have a history of Outpatient Therapy (PT/OT)?: No  Does the patient have a history of Short-Term Rehab?: No  Does patient have a history of HHC?: Yes ([facility])  Does patient currently have [facility]?: No    Patient Information Continued  Income Source: Pension/snf  Does patient have prescription coverage?: Yes  Within the past 12 months, you worried that your food would run out before you got the money to buy more : Never true  Within the past 12 months, the food you bought just didn't last and you didn't have money to get more : Never true  Food insecurity resource given?: N/A  Does patient receive dialysis treatments?: No  Does patient have a history of substance abuse?: Yes  Historical substance use preference: Alcohol/ETOH  Is patient currently in treatment for substance abuse?: N/A - sober  Does patient have a history of Mental Health Diagnosis?: Yes (anxiety and depression)  Has patient received inpatient treatment related to mental health in the last 2 years?: No    Means of Transportation  Means of Transport to Appts[de-identified] Family transport  In the past 12 months, has lack of transportation kept you from medical appointments or from getting medications?: No  In the past 12 months, has lack of transportation kept you from meetings, work, or from getting things needed for daily living?: No  Was application for public transport provided?: N/A    DISCHARGE DETAILS:    Discharge planning discussed with[de-identified] patient  Freedom of Choice: Yes  Comments - Freedom of Choice: Pt presents his plan is to return home  Pt refuses home care services and SNF  Pt reports his wife works in home care agency and will be able to assist Pt at home  Additional Comments: Met with Pt  Pt presents AA&Ox3  Discussed role of case management  Pt lives with wife and son in [facility], [address]  Uses walker  Has cane and shower chair  Denies hx of SNF/MH/D&A  Pt hx of [facility]  Pt refuses VNA and SNF  Pt reports his wife and family drive and goes grocery shopping,  Pt reports his wife works in home care agency and reports that his wife will assist him at home  Pt reports his wife will transport him home

## 2023-04-12 NOTE — UTILIZATION REVIEW
NOTIFICATION OF INPATIENT ADMISSION   AUTHORIZATION REQUEST   SERVICING FACILITY:   [facility]  [address] [place] [address]  Tax ID: [id number]  NPI: [id number] ATTENDING PROVIDER:  Attending Name and NPI#: Ezio Srinivasan Md [[id number]]  Address: [address] [place] [address]  Phone: [phone]   ADMISSION INFORMATION:  Place of Service: [place] [address]  Place of Service Code: 21  Inpatient Admission Date/Time: 4/11/23  7:30 AM  Discharge Date/Time: No discharge date for patient encounter  Admitting Diagnosis Code/Description:  Spinal stenosis of lumbar region with neurogenic claudication [M48 062]  Lumbar spondylosis [M47 816]  Lumbar radiculopathy [M54 16]     UTILIZATION REVIEW CONTACT:  Zenaida Cook Utilization   Network Utilization Review Department  Phone: [phone]  Fax [phone]  Email: [email or web address]  Contact for approvals/pending authorizations, clinical reviews, and discharge  PHYSICIAN ADVISORY SERVICES:  Medical Necessity Denial & [employer] Review  Phone: [phone]  Fax: [phone]  Email: [email or web address]

## 2023-04-12 NOTE — ASSESSMENT & PLAN NOTE
Lab Results   Component Value Date    HGBA1C 6 3 (H) 03/14/2023       Recent Labs     04/11/23  0645 04/11/23  1515 04/11/23  1706 04/11/23 2129   POCGLU 251* 229* 213* 231*       Blood Sugar Average: Last 72 hrs:  (P) 231   · Home regiment: jardiance, lantus and glipizide  · Starting SSI ACHS here and jardiance tomorrow  · Carb controlled diet

## 2023-04-12 NOTE — OCCUPATIONAL THERAPY NOTE
Occupational Therapy Evaluation     Patient Name: Kimber George  Today's Date: 4/12/2023  Problem List  Principal Problem:    Spinal stenosis of lumbar region with neurogenic claudication  Active Problems:    Benzodiazepine dependence ([facility] )    Cirrhosis, alcoholic ([facility])    DM (diabetes mellitus) ([facility] )    Lumbar spondylosis    PONV (postoperative nausea and vomiting)    Medical marijuana use    Urinary retention    Past Medical History  Past Medical History:   Diagnosis Date    Anxiety     Arthritis     Cancer ([facility] )     Depression     Diabetes mellitus ([facility] )     Hypertension     Liver disease     Mitral valve prolapse     PONV (postoperative nausea and vomiting)     Thyroid disease      Past Surgical History  Past Surgical History:   Procedure Laterality Date    INCISION AND DRAINAGE OF WOUND Left 8/12/2022    Procedure: INCISION AND DRAINAGE (I&D) EXTREMITY;  Surgeon: John Burden DPM;  Location:  MAIN OR;  Service: Podiatry    JOINT REPLACEMENT Left 03/11/2022    Left TSA    TN ARTHRODESIS POSTERIOR/PSTLAT TQ 1NTRSPC LUMBAR Bilateral 4/11/2023    Procedure: L1-S1 navigated posterior decompression with instrumented fixation fusion;  Surgeon: Carmen De Santiago MD;  Location:  MAIN OR;  Service: Neurosurgery    THYROID SURGERY  2021    remove cancer           04/12/23 1217   OT Last Visit   OT Visit Date 04/12/23   Note Type   Note type Evaluation   Pain Assessment   Pain Assessment Tool 0-10   Pain Score 8   Pain Location/Orientation Location: Back   Pain Onset/Description Onset: Ongoing   Effect of Pain on Daily Activities limits mobility   Hospital Pain Intervention(s) Repositioned;Cold applied; Rest   Restrictions/Precautions   Weight Bearing Precautions Per Order No   Other Precautions Chair Alarm; Bed Alarm   Home Living   Type of [address] [place] [address] One level;Stairs to enter with rails   Home Equipment Walker;Cane   Prior Function   Level of [place] Independent with ADLs   Lives With Spouse Receives Help From [facility] in the last 6 months 1 to 4   Subjective   Subjective Pt received seated in recliner  Pt endorsing significant back pain but agreeable to session  ADL   Eating Assistance 7  Independent   Grooming Assistance 7  Independent   UB Bathing Assistance 3  Moderate Assistance   LB Bathing Assistance 2  Maximal Assistance   UB Dressing Assistance 3  Moderate Assistance   LB Dressing Assistance 2  Maximal [address]  3  Moderate Assistance   Bed Mobility   Additional Comments Pt received seated in recliner  Transfers   Sit to Stand 3  Moderate assistance   Additional items Assist x 2;Verbal cues   Stand to Sit 3  Moderate assistance   Additional items Assist x 2;Verbal cues   Functional Mobility   Functional Mobility 3  Moderate assistance   Additional Comments x 1, RW  Pt able to tolerated 1-2 steps forward and back to recliner   Balance   Static Sitting Poor +   Dynamic Sitting Poor   Static Standing Poor   Dynamic Standing Poor   Activity Tolerance   Activity Tolerance Patient limited by pain   Medical Staff Made Aware PT Lea   Nurse Made Aware NATE ROMERO Assessment   RUE Assessment   (WFL except b/l shoulders 2/2 baseline pathology)   LUE Assessment   LUE Assessment   (WFL except b/l shoulders 2/2 baseline pathology)   Cognition   Overall Cognitive Status WFL   Arousal/Participation Alert   Attention Within functional limits   Orientation Level Oriented X4   Memory Within functional limits   Following Commands Follows all commands and directions without difficulty   Assessment   Limitation Decreased ADL status; Decreased self-care trans;Decreased high-level ADLs; Decreased endurance   Prognosis Fair   Assessment Pt is a 74 y o  male seen for OT evaluation at [facility], admitted 4/11/2023 w/ Spinal stenosis of lumbar region with neurogenic claudication  Pt now s/p T12-S1 lumbar fusion with decompression   Comorbidities affecting pt's functional performance at time of assessment include: Benzodiazepine dependence, DM, cirrhosis, Bilateral adhesive capsulitis of shoulders, etc (see chart)   Personal factors affecting pt at time of IE include:steps to enter environment, difficulty performing ADLS, difficulty performing IADLS  and decreased functional mobility  Prior to admission, pt was living with wife and kids in [facility]  Pt required assist with ADLS and IADLS, & required RW/cane PTA  Upon evaluation: Pt requires mod Ax 1-2  for functional mobility/transfers, sup-mod A for UB ADLs and mod-max A for LB ADLS 2* the following deficits impacting occupational performance: decreased strength, decreased balance, decreased tolerance, increased pain and orthopedic restrictions  Full objective findings from OT assessment regarding body systems outlined above  These impairments, as well as risk for falls  limit pt's ability to safely engage in all baseline areas of occupation and mobility  Pt to benefit from continued skilled OT tx while in the hospital to address deficits as defined above and maximize level of functional independence w ADL's and functional mobility  Occupational Performance areas to address include: bathing/shower, toilet hygiene, dressing and functional mobility  This evaluation required an extensive review of medical and/or therapy records and additional review of physical, cognitive and psychosocial history related to functional performance  Based upon functional performance deficits and assessments, this evaluation has been identified as a high complexity evaluation  The patient's raw score on the AM-PAC Daily Activity inpatient short form is 16, standardized score is 35 96, less than 39 4  Patients at this level are likely to benefit from DC to post-acute rehabilitation services  However please refer to therapist recommendation for discharge planning given other factors that may influence destination     Goals   Patient Goals Pt wishes for pain to improve   Plan   Treatment Interventions ADL retraining;Functional transfer training;Patient/family training; Compensatory technique education;Equipment evaluation/education   Goal Expiration Date 04/22/23   OT Treatment Day 0   OT Frequency 3-5x/wk   Recommendation   UB Rehab Discharge Recommendation (PT/OT) Level 2  (Pt endorses that he prefers to go home with home therapy)   AM-PAC Daily Activity Inpatient   Lower Body Dressing 1   Bathing 2   Toileting 2   Upper Body Dressing 3   Grooming 4   Eating 4   Daily Activity Raw Score 16   Daily Activity Standardized Score (Calc for Raw Score >=11) 35 96   AM-PAC Applied Cognition Inpatient   Following a Speech/Presentation 4   Understanding Ordinary Conversation 4   Taking Medications 4   Remembering Where Things Are Placed or Put Away 4   Remembering List of 4-5 Errands 4   Taking Care of Complicated Tasks 4   Applied Cognition Raw Score 24   Applied Cognition Standardized Score 62 21   End of Consult   Education Provided Yes   Patient Position at End of Consult Bedside chair;Bed/Chair alarm activated; All needs within reach   Nurse Communication Nurse aware of consult       Pt will achieve the following goals within 10 days  *Pt will complete UB bathing and dressing with sup  *Pt will complete LB bathing and dressing with min Ax 1      * Pt will complete toileting w/ min A x 1 w/ G hygiene/thoroughness using DME PRN    *Pt will perform functional transfers with on/off all surfaces with min A x 1 using DME as needed w/ G balance/safety  *Pt will increase standing tolerance to 2 minutes in order to complete pericare    *Pt will improve functional mobility during ADL/IADL/leisure tasks to min A x 1using DME as needed w/ G balance/safety       Michelle Ortiz OTR/L

## 2023-04-12 NOTE — PLAN OF CARE
Problem: PHYSICAL THERAPY ADULT  Goal: Performs mobility at highest level of function for planned discharge setting  See evaluation for individualized goals  Description: Treatment/Interventions: Functional transfer training, LE strengthening/ROM, Elevations, Therapeutic exercise, Endurance training, Patient/family training, Equipment eval/education, Bed mobility, Gait training  Equipment Recommended: Elizabeth Barnes, Wheelchair       See flowsheet documentation for full assessment, interventions and recommendations  4/12/2023 1414 by Ximena Samaniego, PT  Note:    Problem List: Decreased strength, Impaired balance, Decreased endurance, Decreased mobility, Pain  Assessment: Cassi King is a 74 y o  Male who presents to [address] [place] [address] on 4/11/2023 from home for elective T12-S1 decompression and fusion w/ pre-op dx of spinal stenosis  Orders for PT eval and treat received  Pt presents w/ comorbidities of alcoholic cirrhosis, DMII, CPS  At baseline, pt mobilizes mod I w/ RW vs SPC, and reports 3 falls in the last 6 months  Upon evaluation, pt presents w/ the following deficits: weakness, impaired balance, decreased endurance and pain limiting functional mobility  Upon eval, pt requires mod A x 2 for transfers, and mod A x 1 for gait  PT evaluation recommendation is for Level II  During this admission, pt would benefit from continued skilled inpatient PT in the acute care setting in order to address the abovementioned deficits to maximize function and mobility before DC from acute care  See flowsheet documentation for full assessment

## 2023-04-13 LAB
ANION GAP SERPL CALCULATED.3IONS-SCNC: 7 MMOL/L (ref 4–13)
BASOPHILS # BLD AUTO: 0.07 THOUSANDS/ΜL (ref 0–0.1)
BASOPHILS NFR BLD AUTO: 1 % (ref 0–1)
BUN SERPL-MCNC: 10 MG/DL (ref 5–25)
CALCIUM SERPL-MCNC: 8.6 MG/DL (ref 8.4–10.2)
CHLORIDE SERPL-SCNC: 99 MMOL/L (ref 96–108)
CO2 SERPL-SCNC: 31 MMOL/L (ref 21–32)
CREAT SERPL-MCNC: 0.77 MG/DL (ref 0.6–1.3)
EOSINOPHIL # BLD AUTO: 0.03 THOUSAND/ΜL (ref 0–0.61)
EOSINOPHIL NFR BLD AUTO: 0 % (ref 0–6)
ERYTHROCYTE [DISTWIDTH] IN BLOOD BY AUTOMATED COUNT: 16.6 % (ref 11.6–15.1)
GFR SERPL CREATININE-BSD FRML MDRD: 95 ML/MIN/1.73SQ M
GLUCOSE SERPL-MCNC: 117 MG/DL (ref 65–140)
GLUCOSE SERPL-MCNC: 123 MG/DL (ref 65–140)
GLUCOSE SERPL-MCNC: 145 MG/DL (ref 65–140)
GLUCOSE SERPL-MCNC: 177 MG/DL (ref 65–140)
GLUCOSE SERPL-MCNC: 302 MG/DL (ref 65–140)
HCT VFR BLD AUTO: 27.4 % (ref 36.5–49.3)
HGB BLD-MCNC: 8.6 G/DL (ref 12–17)
IMM GRANULOCYTES # BLD AUTO: 0.06 THOUSAND/UL (ref 0–0.2)
IMM GRANULOCYTES NFR BLD AUTO: 1 % (ref 0–2)
LYMPHOCYTES # BLD AUTO: 2.3 THOUSANDS/ΜL (ref 0.6–4.47)
LYMPHOCYTES NFR BLD AUTO: 18 % (ref 14–44)
MCH RBC QN AUTO: 27.3 PG (ref 26.8–34.3)
MCHC RBC AUTO-ENTMCNC: 31.4 G/DL (ref 31.4–37.4)
MCV RBC AUTO: 87 FL (ref 82–98)
MONOCYTES # BLD AUTO: 1.14 THOUSAND/ΜL (ref 0.17–1.22)
MONOCYTES NFR BLD AUTO: 9 % (ref 4–12)
NEUTROPHILS # BLD AUTO: 9.03 THOUSANDS/ΜL (ref 1.85–7.62)
NEUTS SEG NFR BLD AUTO: 71 % (ref 43–75)
NRBC BLD AUTO-RTO: 0 /100 WBCS
PLATELET # BLD AUTO: 254 THOUSANDS/UL (ref 149–390)
PMV BLD AUTO: 10.1 FL (ref 8.9–12.7)
POTASSIUM SERPL-SCNC: 3.6 MMOL/L (ref 3.5–5.3)
RBC # BLD AUTO: 3.15 MILLION/UL (ref 3.88–5.62)
SODIUM SERPL-SCNC: 137 MMOL/L (ref 135–147)
WBC # BLD AUTO: 12.63 THOUSAND/UL (ref 4.31–10.16)

## 2023-04-13 RX ORDER — CLONAZEPAM 1 MG/1
1 TABLET ORAL 2 TIMES DAILY PRN
Status: DISCONTINUED | OUTPATIENT
Start: 2023-04-13 | End: 2023-04-15

## 2023-04-13 RX ADMIN — HYDROCHLOROTHIAZIDE 25 MG: 25 TABLET ORAL at 08:31

## 2023-04-13 RX ADMIN — OXYCODONE HYDROCHLORIDE 5 MG: 5 TABLET ORAL at 06:37

## 2023-04-13 RX ADMIN — INSULIN LISPRO 1 UNITS: 100 INJECTION, SOLUTION INTRAVENOUS; SUBCUTANEOUS at 16:35

## 2023-04-13 RX ADMIN — SENNOSIDES 8.6 MG: 8.6 TABLET, FILM COATED ORAL at 08:35

## 2023-04-13 RX ADMIN — FOLIC ACID 2000 MCG: 1 TABLET ORAL at 08:40

## 2023-04-13 RX ADMIN — CLONAZEPAM 1 MG: 1 TABLET ORAL at 08:31

## 2023-04-13 RX ADMIN — KETAMINE HYDROCHLORIDE 0.1 MG/KG/HR: 10 INJECTION INTRAMUSCULAR; INTRAVENOUS at 11:32

## 2023-04-13 RX ADMIN — OXYCODONE HYDROCHLORIDE 10 MG: 5 TABLET ORAL at 22:24

## 2023-04-13 RX ADMIN — GABAPENTIN 300 MG: 300 CAPSULE ORAL at 08:32

## 2023-04-13 RX ADMIN — DOCUSATE SODIUM 100 MG: 100 CAPSULE, LIQUID FILLED ORAL at 18:38

## 2023-04-13 RX ADMIN — EMPAGLIFLOZIN 10 MG: 10 TABLET, FILM COATED ORAL at 08:31

## 2023-04-13 RX ADMIN — DOCUSATE SODIUM 100 MG: 100 CAPSULE, LIQUID FILLED ORAL at 08:31

## 2023-04-13 RX ADMIN — HEPARIN SODIUM 5000 UNITS: 5000 INJECTION INTRAVENOUS; SUBCUTANEOUS at 16:38

## 2023-04-13 RX ADMIN — LOSARTAN POTASSIUM 100 MG: 50 TABLET, FILM COATED ORAL at 08:35

## 2023-04-13 RX ADMIN — OXYCODONE HYDROCHLORIDE 10 MG: 5 TABLET ORAL at 16:36

## 2023-04-13 RX ADMIN — TAMSULOSIN HYDROCHLORIDE 0.4 MG: 0.4 CAPSULE ORAL at 16:39

## 2023-04-13 RX ADMIN — SERTRALINE HYDROCHLORIDE 100 MG: 100 TABLET ORAL at 08:35

## 2023-04-13 RX ADMIN — HEPARIN SODIUM 5000 UNITS: 5000 INJECTION INTRAVENOUS; SUBCUTANEOUS at 08:36

## 2023-04-13 RX ADMIN — MIRTAZAPINE 30 MG: 15 TABLET, FILM COATED ORAL at 22:18

## 2023-04-13 RX ADMIN — NIFEDIPINE 120 MG: 30 TABLET, FILM COATED, EXTENDED RELEASE ORAL at 08:35

## 2023-04-13 RX ADMIN — METHOCARBAMOL 500 MG: 500 TABLET ORAL at 11:43

## 2023-04-13 RX ADMIN — INSULIN LISPRO 3 UNITS: 100 INJECTION, SOLUTION INTRAVENOUS; SUBCUTANEOUS at 11:32

## 2023-04-13 RX ADMIN — ACETAMINOPHEN 325MG 975 MG: 325 TABLET ORAL at 16:38

## 2023-04-13 RX ADMIN — METHOCARBAMOL 500 MG: 500 TABLET ORAL at 18:38

## 2023-04-13 RX ADMIN — Medication 5000 UNITS: at 08:40

## 2023-04-13 RX ADMIN — METHOCARBAMOL 500 MG: 500 TABLET ORAL at 00:44

## 2023-04-13 RX ADMIN — METHOCARBAMOL 500 MG: 500 TABLET ORAL at 06:33

## 2023-04-13 RX ADMIN — GABAPENTIN 300 MG: 300 CAPSULE ORAL at 18:38

## 2023-04-13 RX ADMIN — METOPROLOL SUCCINATE 75 MG: 50 TABLET, EXTENDED RELEASE ORAL at 08:34

## 2023-04-13 RX ADMIN — ACETAMINOPHEN 325MG 975 MG: 325 TABLET ORAL at 08:35

## 2023-04-13 RX ADMIN — ACETAMINOPHEN 325MG 975 MG: 325 TABLET ORAL at 00:44

## 2023-04-13 RX ADMIN — OXYCODONE HYDROCHLORIDE 5 MG: 5 TABLET ORAL at 10:26

## 2023-04-13 RX ADMIN — HEPARIN SODIUM 5000 UNITS: 5000 INJECTION INTRAVENOUS; SUBCUTANEOUS at 00:46

## 2023-04-13 NOTE — ASSESSMENT & PLAN NOTE
· Patient with a history of chronic lower back pain and radiculopathy secondary to lumbar spondylosis and stenosis  He follows with neurosurgery, Dr Cindy Esparza  He has had a spinal cord stimulator trial, which helped some but he continued with lower back pain  · Patient is postop day 2 status post T12-S1 fusion and drain placement   · admitted to ICU postoperative  · Acute pain service consulted, Ketamine infusion for pain control  · Currently patient rating pain 6 out of 10  Stating it is tolerable     · Not in any acute distress   · He has Dilaudid and oxycodone as needed  · Completed operative abx   · Continue cardiopulmonary monitoring  · Routine monitoring neurovascular checks  · PT OT ordered

## 2023-04-13 NOTE — PLAN OF CARE
Problem: Prexisting or High Potential for Compromised Skin Integrity  Goal: Skin integrity is maintained or improved  Description: INTERVENTIONS:  - Identify patients at risk for skin breakdown  - Assess and monitor skin integrity  - Assess and monitor nutrition and hydration status  - Monitor labs   - Assess for incontinence   - Turn and reposition patient  - Assist with mobility/ambulation  - Relieve pressure over bony prominences  - Avoid friction and shearing  - Provide appropriate hygiene as needed including keeping skin clean and dry  - Evaluate need for skin moisturizer/barrier cream  - Collaborate with interdisciplinary team   - Patient/family teaching  - Consider wound care consult   Outcome: Progressing     Problem: MOBILITY - ADULT  Goal: Maintain or return to baseline ADL function  Description: INTERVENTIONS:  -  Assess patient's ability to carry out ADLs; assess patient's baseline for ADL function and identify physical deficits which impact ability to perform ADLs (bathing, care of mouth/teeth, toileting, grooming, dressing, etc )  - Assess/evaluate cause of self-care deficits   - Assess range of motion  - Assess patient's mobility; develop plan if impaired  - Assess patient's need for assistive devices and provide as appropriate  - Encourage maximum independence but intervene and supervise when necessary  - Involve family in performance of ADLs  - Assess for home care needs following discharge   - Consider OT consult to assist with ADL evaluation and planning for discharge  - Provide patient education as appropriate  Outcome: Progressing  Goal: Maintains/Returns to pre admission functional level  Description: INTERVENTIONS:  - Perform BMAT or MOVE assessment daily    - Set and communicate daily mobility goal to care team and patient/family/caregiver     - Collaborate with rehabilitation services on mobility goals if consulted    - Out of bed for toileting  - Record patient progress and toleration of activity level   Outcome: Progressing     Problem: PAIN - ADULT  Goal: Verbalizes/displays adequate comfort level or baseline comfort level  Description: Interventions:  - Encourage patient to monitor pain and request assistance  - Assess pain using appropriate pain scale  - Administer analgesics based on type and severity of pain and evaluate response  - Implement non-pharmacological measures as appropriate and evaluate response  - Consider cultural and social influences on pain and pain management  - Notify physician/advanced practitioner if interventions unsuccessful or patient reports new pain  Outcome: Progressing     Problem: INFECTION - ADULT  Goal: Absence or prevention of progression during hospitalization  Description: INTERVENTIONS:  - Assess and monitor for signs and symptoms of infection  - Monitor lab/diagnostic results  - Monitor all insertion sites, i e  indwelling lines, tubes, and drains  - Monitor endotracheal if appropriate and nasal secretions for changes in amount and color  - [place] appropriate cooling/warming therapies per order  - Administer medications as ordered  - Instruct and encourage patient and family to use good hand hygiene technique  - Identify and instruct in appropriate isolation precautions for identified infection/condition  Outcome: Progressing  Goal: Absence of fever/infection during neutropenic period  Description: INTERVENTIONS:  - Monitor WBC    Outcome: Progressing     Problem: SAFETY ADULT  Goal: Maintain or return to baseline ADL function  Description: INTERVENTIONS:  -  Assess patient's ability to carry out ADLs; assess patient's baseline for ADL function and identify physical deficits which impact ability to perform ADLs (bathing, care of mouth/teeth, toileting, grooming, dressing, etc )  - Assess/evaluate cause of self-care deficits   - Assess range of motion  - Assess patient's mobility; develop plan if impaired  - Assess patient's need for assistive devices and provide as appropriate  - Encourage maximum independence but intervene and supervise when necessary  - Involve family in performance of ADLs  - Assess for home care needs following discharge   - Consider OT consult to assist with ADL evaluation and planning for discharge  - Provide patient education as appropriate  Outcome: Progressing  Goal: Maintains/Returns to pre admission functional level  Description: INTERVENTIONS:  - Perform BMAT or MOVE assessment daily    - Set and communicate daily mobility goal to care team and patient/family/caregiver     - Collaborate with rehabilitation services on mobility goals if consulted    - Out of bed for toileting  - Record patient progress and toleration of activity level   Outcome: Progressing  Goal: Patient will remain free of falls  Description: INTERVENTIONS:  - Educate patient/family on patient safety including physical limitations  - Instruct patient to call for assistance with activity   - Consult OT/PT to assist with strengthening/mobility   - Keep Call bell within reach  - Keep bed low and locked with side rails adjusted as appropriate  - Keep care items and personal belongings within reach  - Initiate and maintain comfort rounds  - Make Fall Risk Sign visible to staff    - Apply yellow socks and bracelet for high fall risk patients  - Consider moving patient to room near nurses station  Outcome: Progressing     Problem: DISCHARGE PLANNING  Goal: Discharge to home or other facility with appropriate resources  Description: INTERVENTIONS:  - Identify barriers to discharge w/patient and caregiver  - Arrange for needed discharge resources and transportation as appropriate  - Identify discharge learning needs (meds, wound care, etc )  - Arrange for interpretive services to assist at discharge as needed  - Refer to Case Management Department for coordinating discharge planning if the patient needs post-hospital services based on physician/advanced practitioner order or complex needs related to functional status, cognitive ability, or social support system  Outcome: Progressing     Problem: Knowledge Deficit  Goal: Patient/family/caregiver demonstrates understanding of disease process, treatment plan, medications, and discharge instructions  Description: Complete learning assessment and assess knowledge base    Interventions:  - Provide teaching at level of understanding  - Provide teaching via preferred learning methods  Outcome: Progressing

## 2023-04-13 NOTE — ASSESSMENT & PLAN NOTE
· Post op urinary retention  · Flomax started   · Straight cathed 3 times with high PV residuals   · Retention protocol  · Will likely require a ball

## 2023-04-13 NOTE — CASE MANAGEMENT
Case Management Discharge Planning Note    Patient name Kori Hoffman  Location  SDU/-01 S* MRN [id number]  : 1955 Date 2023       Current Admission Date: 2023  Current Admission Diagnosis:Spinal stenosis of lumbar region with neurogenic claudication   Patient Active Problem List    Diagnosis Date Noted   • Urinary retention 2023   • PONV (postoperative nausea and vomiting)    • Medical marijuana use    • Chronic bilateral low back pain without sciatica 2023   • Lumbar radiculopathy    • Spinal stenosis of lumbar region with neurogenic claudication 2023   • Chronic pain syndrome 2022   • Liver disease 08/10/2022   • Bronchitis, mucopurulent recurrent ([facility] ) 2022   • Cirrhosis, alcoholic ([facility] )    • Diabetic peripheral neuropathy ([facility] ) 2022   • Herniated nucleus pulposus of lumbosacral region 2022   • Thyroid cancer ([facility] ) 2021   • Alcoholism in remission ([facility] ) 2021   • Benzodiazepine dependence ([facility] ) 2021   • Bilateral adhesive capsulitis of shoulders 2021   • DM (diabetes mellitus) ([facility] ) 2021   • Hypercholesterolemia 2021   • Lumbar spondylosis 2021      LOS (days): 2  Geometric Mean LOS (GMLOS) (days): 2 80  Days to GMLOS:0 5     OBJECTIVE:  Risk of Unplanned Readmission Score: 30 76         Current admission status: Inpatient   Preferred Pharmacy:   [facility] [address], [address]  [address]  Phone: [phone] Fax: [phone]    Primary Care Provider: Jose Bhardwaj DO    Primary Insurance: [facility]  Secondary Insurance: [facility]    DISCHARGE DETAILS:    IMM Given (Date):: 23  IMM Given to[de-identified] Patient     Additional Comments: Met with Pt re: discharge planning  Pt now in agreement for SNF and agreeable for blanket SNF referrals within 10 miles   Will need to obtain insurance auth for SNF prior to discharge  [facility] referral sent within 10 miles via [facility]

## 2023-04-13 NOTE — PLAN OF CARE
Problem: Prexisting or High Potential for Compromised Skin Integrity  Goal: Skin integrity is maintained or improved  Description: INTERVENTIONS:  - Identify patients at risk for skin breakdown  - Assess and monitor skin integrity  - Assess and monitor nutrition and hydration status  - Monitor labs   - Assess for incontinence   - Turn and reposition patient  - Assist with mobility/ambulation  - Relieve pressure over bony prominences  - Avoid friction and shearing  - Provide appropriate hygiene as needed including keeping skin clean and dry  - Evaluate need for skin moisturizer/barrier cream  - Collaborate with interdisciplinary team   - Patient/family teaching  - Consider wound care consult   Outcome: Progressing     Problem: MOBILITY - ADULT  Goal: Maintain or return to baseline ADL function  Description: INTERVENTIONS:  -  Assess patient's ability to carry out ADLs; assess patient's baseline for ADL function and identify physical deficits which impact ability to perform ADLs (bathing, care of mouth/teeth, toileting, grooming, dressing, etc )  - Assess/evaluate cause of self-care deficits   - Assess range of motion  - Assess patient's mobility; develop plan if impaired  - Assess patient's need for assistive devices and provide as appropriate  - Encourage maximum independence but intervene and supervise when necessary  - Involve family in performance of ADLs  - Assess for home care needs following discharge   - Consider OT consult to assist with ADL evaluation and planning for discharge  - Provide patient education as appropriate  Outcome: Progressing  Goal: Maintains/Returns to pre admission functional level  Description: INTERVENTIONS:  - Perform BMAT or MOVE assessment daily    - Set and communicate daily mobility goal to care team and patient/family/caregiver  - Collaborate with rehabilitation services on mobility goals if consulted  - Perform Range of Motion 3 times a day    - Reposition patient every 2 hours   - Dangle patient 2 times a day  - Stand patient 2 times a day  - Ambulate patient 2 times a day  - Out of bed to chair 2 times a day   - Out of bed for meals 2 times a day  - Out of bed for toileting  - Record patient progress and toleration of activity level   Outcome: Progressing     Problem: PAIN - ADULT  Goal: Verbalizes/displays adequate comfort level or baseline comfort level  Description: Interventions:  - Encourage patient to monitor pain and request assistance  - Assess pain using appropriate pain scale  - Administer analgesics based on type and severity of pain and evaluate response  - Implement non-pharmacological measures as appropriate and evaluate response  - Consider cultural and social influences on pain and pain management  - Notify physician/advanced practitioner if interventions unsuccessful or patient reports new pain  Outcome: Progressing     Problem: INFECTION - ADULT  Goal: Absence or prevention of progression during hospitalization  Description: INTERVENTIONS:  - Assess and monitor for signs and symptoms of infection  - Monitor lab/diagnostic results  - Monitor all insertion sites, i e  indwelling lines, tubes, and drains  - Monitor endotracheal if appropriate and nasal secretions for changes in amount and color  - [place] appropriate cooling/warming therapies per order  - Administer medications as ordered  - Instruct and encourage patient and family to use good hand hygiene technique  - Identify and instruct in appropriate isolation precautions for identified infection/condition  Outcome: Progressing  Goal: Absence of fever/infection during neutropenic period  Description: INTERVENTIONS:  - Monitor WBC    Outcome: Progressing     Problem: SAFETY ADULT  Goal: Maintain or return to baseline ADL function  Description: INTERVENTIONS:  -  Assess patient's ability to carry out ADLs; assess patient's baseline for ADL function and identify physical deficits which impact ability to perform ADLs (bathing, care of mouth/teeth, toileting, grooming, dressing, etc )  - Assess/evaluate cause of self-care deficits   - Assess range of motion  - Assess patient's mobility; develop plan if impaired  - Assess patient's need for assistive devices and provide as appropriate  - Encourage maximum independence but intervene and supervise when necessary  - Involve family in performance of ADLs  - Assess for home care needs following discharge   - Consider OT consult to assist with ADL evaluation and planning for discharge  - Provide patient education as appropriate  Outcome: Progressing  Goal: Maintains/Returns to pre admission functional level  Description: INTERVENTIONS:  - Perform BMAT or MOVE assessment daily    - Set and communicate daily mobility goal to care team and patient/family/caregiver  - Collaborate with rehabilitation services on mobility goals if consulted  - Perform Range of Motion 3 times a day  - Reposition patient every 2 hours    - Dangle patient 2 times a day  - Stand patient 2 times a day  - Ambulate patient 2 times a day  - Out of bed to chair 2 times a day   - Out of bed for meals 2 times a day  - Out of bed for toileting  - Record patient progress and toleration of activity level   Outcome: Progressing  Goal: Patient will remain free of falls  Description: INTERVENTIONS:  - Educate patient/family on patient safety including physical limitations  - Instruct patient to call for assistance with activity   - Consult OT/PT to assist with strengthening/mobility   - Keep Call bell within reach  - Keep bed low and locked with side rails adjusted as appropriate  - Keep care items and personal belongings within reach  - Initiate and maintain comfort rounds  - Make Fall Risk Sign visible to staff  - Offer Toileting every 2 Hours, in advance of need  - Initiate/Maintain be dalarm  - Apply yellow socks and bracelet for high fall risk patients  - Consider moving patient to room near nurses station  Outcome: Progressing     Problem: DISCHARGE PLANNING  Goal: Discharge to home or other facility with appropriate resources  Description: INTERVENTIONS:  - Identify barriers to discharge w/patient and caregiver  - Arrange for needed discharge resources and transportation as appropriate  - Identify discharge learning needs (meds, wound care, etc )  - Arrange for interpretive services to assist at discharge as needed  - Refer to Case Management Department for coordinating discharge planning if the patient needs post-hospital services based on physician/advanced practitioner order or complex needs related to functional status, cognitive ability, or social support system  Outcome: Progressing     Problem: Knowledge Deficit  Goal: Patient/family/caregiver demonstrates understanding of disease process, treatment plan, medications, and discharge instructions  Description: Complete learning assessment and assess knowledge base    Interventions:  - Provide teaching at level of understanding  - Provide teaching via preferred learning methods  Outcome: Progressing

## 2023-04-13 NOTE — TELEMEDICINE
REQUIRED DOCUMENTATION:     1  This service was provided via Telemedicine  2  Provider located at [facility]  3  TeleMed provider: Subha Kirk MD   4  Identify all parties in room with patient during tele consult:  none  5  Patient was then informed that this was a Telemedicine visit and that the exam was being conducted confidentially over secure lines  My office door was closed  No one else was in the room  Patient acknowledged consent and understanding of privacy and security of the Telemedicine visit, and gave us permission to have the assistant stay in the room in order to assist with the history and to conduct the exam   I informed the patient that I have reviewed their record in Epic and presented the opportunity for them to ask any questions regarding the visit today  The patient agreed to participate  Progress Note - Acute Pain Service    Cassi King 74 y o  male MRN: [id number]  Unit/Bed#: -01 SDU Encounter: [id number]      Assessment:   Cassi King is a 74 y o  male with a past medical history significant for hepatosteatosis, bipolar 2 disorder, diabetes mellitus complicated by peripheral neuropathy, chronic pain syndrome in the setting of lumbar spinal stenosis with neurogenic claudication and lumbar radiculopathy status post spinal cord stimulator trial with chronic opioid dependence currently on tramadol 50 mg twice daily at home (follows with [facility]), anxiety with benzodiazepine dependence ordered clonazepam 1 mg twice daily at home (follows with [address] [place] [address]) who presents for surgical management of lumbar spondylosis with spinal stenosis  Patient underwent T12-S1 posterior instrumented fixation fusion, L1-L2 and L4-L5 posterior decompression with bilateral laminectomy and total facetectomy, L2-L3 and L3-L4 posterior decompression with bilateral laminectomy and medial facetectomy, L5-S1 bilateral laminoforaminotomy for decompression on 4/11/2023    Acute pain service has been consulted for postoperative pain management      In terms of substance use history, patient states that he is prescribed tramadol and clonazepam which he takes each twice daily  He states that he used to have a problem with alcohol and smoking cigarettes but he states that he quit on his own in 0  He does state that he has 1-2 beers occasionally with his brother but states he is only had alcohol one of the last 30 days  He also has a medical marijuana card however states he does not use this because he did not find this effective for his pain control  On evaluation today, patient appears much more comfortable  He is resting in chair and states that he was able to ambulate there himself  He does continue to have some incisional pain as well as pain that wraps around his flanks but states that all this is improving and that the pain in his abdomen has almost gone away  He states he is tolerating ketamine infusion without any issues and upon chart review, his blood pressure and heart rate have actually improved since starting ketamine infusion  Of note, patient reports losing his psychiatrist and therapist recently    He is unable to find these outpatient and is asking for assistance      Plan:   Continue ketamine infusion at 0 1 mg/kg/hr with as needed glycopyrrolate and haloperidol (confirmed medication availability with pharmacy)  Change clonazepam to 1 mg p o  twice daily as needed for anxiety (home med)  Continue acetaminophen 975 mg p o  every 8 hours scheduled  Continue oxycodone 5 mg p o  every 4 hours as needed for moderate pain  Continue oxycodone 10 mg p o  every 4 hours as needed for severe pain  Continue hydromorphone 0 2 mg IV every 4 hours as needed for breakthrough pain  Continue gabapentin 300 mg p o  twice daily  Continue methocarbamol 500 mg p o  every 6 hours scheduled  Continue lidocaine patch x2 to the bilateral paraspinal lumbar region 12 hours on/12 hours off  Continue naloxone as needed for respiratory depression/opioid reversal  Bowel regimen per primary team to avoid opioid-induced constipation  We will discuss outpatient resources for mental health with [facility]    [facility] will continue to follow  Please contact Acute Pain Service - [facility] via [employer] from [phone] with additional questions or concerns  See Stella or Jeannie for additional contacts and after hours information  Pain History  Current pain location(s): Mid to low back wrapping around flanks bilaterally  Pain Scale:   6-8  Quality: Sore, sharp  24 hour history: Patient was started on ketamine infusion which she has been tolerating without issue  He was also restarted on his home clonazepam   Patient has had improvement in his blood pressure and heart rate since starting ketamine infusion  Otherwise he has remained afebrile however is now requiring oxygen by nasal cannula at 2 L  Labs from today significant for creatinine 0 77, leukocytosis with WBC 12 63 and absolute neutrophilia, normocytic anemia with hemoglobin 8 6 and MCV 87     Opioid requirement previous 24 hours:   Oxycodone 25 mg p o  Hydromorphone 0 2 mg IV    Meds/Allergies   all current active meds have been reviewed    Allergies   Allergen Reactions   • Abilify [Aripiprazole] Tremor     Shaking     • Cephalexin Rash   • [employer] Allergic Rhinitis   • Pregabalin Tremor     Lyrica - shaking feeling       Objective     Temp:  [97 2 °F (36 2 °C)-99 °F (37 2 °C)] 98 4 °F (36 9 °C)  HR:  [] 96  Resp:  [14-30] 18  BP: (100-170)/() 142/65    Physical Exam  Vitals and nursing note reviewed  Constitutional:       General: He is not in acute distress  Appearance: Normal appearance  He is not ill-appearing or diaphoretic  Comments: Resting comfortably in chair   HENT:      Head: Normocephalic and atraumatic  Eyes:      General: No scleral icterus  Pulmonary:      Effort: Pulmonary effort is normal  No respiratory distress     Skin:     Coloration: Skin is not jaundiced  Neurological:      Mental Status: He is alert and oriented to person, place, and time  Psychiatric:         Mood and Affect: Mood normal          Behavior: Behavior normal          Thought Content: Thought content normal          Judgment: Judgment normal          Lab Results:   Results from last 7 days   Lab Units 04/13/23  0432   WBC Thousand/uL 12 63*   HEMOGLOBIN g/dL 8 6*   HEMATOCRIT % 27 4*   PLATELETS Thousands/uL 254      Results from last 7 days   Lab Units 04/13/23  0432 04/12/23  0429   POTASSIUM mmol/L 3 6 3 3*   CHLORIDE mmol/L 99 100   CO2 mmol/L 31 33*   BUN mg/dL 10 11   CREATININE mg/dL 0 77 0 73   CALCIUM mg/dL 8 6 8 5   ALK PHOS U/L  --  63   ALT U/L  --  16   AST U/L  --  31       Imaging Studies: I have personally reviewed pertinent reports  EKG, Pathology, and Other Studies: I have personally reviewed pertinent reports  Please note that the APS provides consultative services regarding pain management only  With the exception of ketamine and epidural infusions and except when indicated, final decisions regarding starting or changing doses of analgesic medications are at the discretion of the consulting service  Off hours consultation and/or medication management is generally not available      Candelario Putnam MD  Acute Pain Service

## 2023-04-13 NOTE — ASSESSMENT & PLAN NOTE
Lab Results   Component Value Date    HGBA1C 6 3 (H) 03/14/2023       Recent Labs     04/12/23  0621 04/12/23  1029 04/12/23  1608 04/12/23 2049   POCGLU 169* 179* 169* 141*       Blood Sugar Average: Last 72 hrs:  (P) 197 75   · Home regiment: jardiance, lantus and glipizide  · Jardiance restarted   · Carb controlled diet

## 2023-04-13 NOTE — PROGRESS NOTES
[place]  Progress Note  Name: Zeinab Collier  MRN: [id number]  Unit/Bed#: -01 SDU I Date of Admission: 4/11/2023   Date of Service: 4/13/2023 I Hospital Day: 2    Assessment/Plan   * Spinal stenosis of lumbar region with neurogenic claudication  Assessment & Plan  · Patient with a history of chronic lower back pain and radiculopathy secondary to lumbar spondylosis and stenosis  He follows with neurosurgery, Dr Tatyana Campbell  He has had a spinal cord stimulator trial, which helped some but he continued with lower back pain  · Patient is postop day 2 status post T12-S1 fusion and drain placement   · admitted to ICU postoperative  · Acute pain service consulted, Ketamine infusion for pain control  · Currently patient rating pain 6 out of 10  Stating it is tolerable  · Not in any acute distress   · He has Dilaudid and oxycodone as needed  · Completed operative abx   · Continue cardiopulmonary monitoring  · Routine monitoring neurovascular checks  · PT OT ordered    Lumbar spondylosis  Assessment & Plan  See pain management plan above    Urinary retention  Assessment & Plan  · Post op urinary retention  · Flomax started   · Straight cathed 3 times with high PV residuals   · Retention protocol  · Will likely require a ball       PONV (postoperative nausea and vomiting)  Assessment & Plan  · Denies N/V on exam  · Zofran PRN    DM (diabetes mellitus) ([address] )  Assessment & Plan  Lab Results   Component Value Date    HGBA1C 6 3 (H) 03/14/2023       Recent Labs     04/12/23  0621 04/12/23  1029 04/12/23  1608 04/12/23  2049   POCGLU 169* 179* 169* 141*       Blood Sugar Average: Last 72 hrs:  (P) 197 75   · Home regiment: jardiance, lantus and glipizide  · Jardiance restarted   · Carb controlled diet    Cirrhosis, alcoholic (HCC)  Assessment & Plan  · LFT not elevated   · Benign abdominal exam    Benzodiazepine dependence ([address] )  Assessment & Plan  · Home dose of Klonopin restarted ICU Core Measures     A: Assess, Prevent, and Manage Pain · Has pain been assessed? Yes  · Need for changes to pain regimen? No   B: Both SAT/SAT  · N/A   C: Choice of Sedation · RASS Goal: 0 Alert and Calm  · Need for changes to sedation or analgesia regimen? No   D: Delirium · CAM-ICU: Negative   E: Early Mobility  · Plan for early mobility? Yes   F: Family Engagement · Plan for family engagement today? Yes       Review of Invasive Devices:            Prophylaxis:  VTE VTE covered by:  heparin (porcine), Subcutaneous, 5,000 Units at 04/13/23 0046       Stress Ulcer  not ordered       Subjective   HPI/24hr events: Patient straight cath x3 with high postvoid residuals, will likely need Mendoza catheter placement  Flomax initiated yesterday  Pain well controlled  Placed on nasal cannula overnight  Review of Systems   Constitutional: Negative for chills and fever  Respiratory: Negative for chest tightness and shortness of breath  Cardiovascular: Negative for chest pain  Musculoskeletal: Positive for back pain  All other systems reviewed and are negative  Objective                            Vitals I/O      Most Recent Min/Max in 24hrs   Temp 98 8 °F (37 1 °C) Temp  Min: 97 2 °F (36 2 °C)  Max: 99 °F (37 2 °C)   Pulse 86 Pulse  Min: 86  Max: 106   Resp 17 Resp  Min: 13  Max: 30   /67 BP  Min: 100/55  Max: 172/81   O2 Sat 98 % SpO2  Min: 89 %  Max: 99 %      Intake/Output Summary (Last 24 hours) at 4/13/2023 0437  Last data filed at 4/12/2023 2338  Gross per 24 hour   Intake 2837 78 ml   Output 5145 ml   Net -2307 22 ml         Diet Aj/CHO Controlled; Consistent Carbohydrate Diet Level 2 (5 carb servings/75 grams CHO/meal)     Invasive Monitoring Physical exam   n/a Physical Exam  HENT:      Mouth/Throat:      Mouth: Mucous membranes are moist    Eyes:      Pupils: Pupils are equal, round, and reactive to light  Cardiovascular:      Rate and Rhythm: Normal rate and regular rhythm  Pulses: Normal pulses  Heart sounds: Normal heart sounds  Pulmonary:      Effort: Pulmonary effort is normal       Breath sounds: Normal breath sounds  Abdominal:      General: Abdomen is flat  Palpations: Abdomen is soft  Skin:     General: Skin is warm  Capillary Refill: Capillary refill takes less than 2 seconds  Neurological:      General: No focal deficit present  Mental Status: He is alert and oriented to person, place, and time              Diagnostic Studies      EKG: ST   Imaging:  I have personally reviewed pertinent films in PACS     Medications:  Scheduled PRN   acetaminophen, 975 mg, Q8H [address]  cholecalciferol, 5,000 Units, Daily  clonazePAM, 1 mg, BID  docusate sodium, 100 mg, BID  Empagliflozin, 10 mg, QAM  folic acid, 9,513 mcg, Daily  gabapentin, 300 mg, BID  heparin (porcine), 5,000 Units, Q8H [address]  losartan, 100 mg, QAM   And  hydrochlorothiazide, 25 mg, QAM  insulin lispro, 1-5 Units, 4x Daily (AC & HS)  lidocaine, 2 patch, Daily  methocarbamol, 500 mg, Q6H CHRISTIE  metoprolol succinate, 75 mg, QAM  mirtazapine, 30 mg, HS  NIFEdipine, 120 mg, QAM  senna, 1 tablet, Daily  sertraline, 100 mg, QAM  tamsulosin, 0 4 mg, Daily With Dinner      glycopyrrolate, 0 2 mg, Q4H PRN  haloperidol lactate, 2 mg, Q30 Min PRN  HYDROmorphone, 0 2 mg, Q4H PRN  hydrOXYzine HCL, 50 mg, Q6H PRN  naloxone, 0 04 mg, Q1MIN PRN  ondansetron, 4 mg, Q6H PRN  oxyCODONE, 10 mg, Q4H PRN  oxyCODONE, 5 mg, Q4H PRN       Continuous    ketamine, 0 1 mg/kg/hr, Last Rate: 0 1 mg/kg/hr (04/12/23 1037)         Labs:    CBC    Recent Labs     04/11/23  1709 04/11/23  2136 04/12/23  0429   WBC  --   --  14 52*   HGB 10 6* 10 0* 9 3*   HCT 33 8* 31 7* 28 8*     --  304     BMP    Recent Labs     04/12/23  0429   SODIUM 138   K 3 3*      CO2 33*   AGAP 5   BUN 11   CREATININE 0 73   CALCIUM 8 5       Coags    No recent results     Additional Electrolytes  No recent results       Blood Gas    No recent results  No recent results LFTs  Recent Labs     04/12/23 0429   ALT 16   AST 31   ALKPHOS 63   ALB 3 5   TBILI 0 58       Infectious  No recent results  Glucose  Recent Labs     04/12/23 0429   GLUC 150*                   RUIZ Bosch

## 2023-04-13 NOTE — PROGRESS NOTES
"Progress Note - Katherine Brown 74 y o  male MRN: [id number]    Unit/Bed#: -01 SDU Encounter: [id number]      Assessment:  POD 2 s/p T12- S2 Lumbar decompression and fusion  -on ketamine gtt  -pain control  -monitor drain output  -PT/OT  -OOB as tolerated  -Drain 330 over last 24 hrs  -APS and CC on board    Urinary retention  -straight cath*4   -hx of BPH  -on flomax  -if trial fails, may need ball    ABLA  -most likely from surgery  -Hgb 8 6  -tachycardia, on NC, denies dizziness and cp  -trend labs    DM2, Depression  -SSI,Home meds  -CC on board    Subjective: -  Doing well  Still with significant back pain  Leg pain       Objective:     Vitals: Blood pressure 142/65, pulse 96, temperature 98 4 °F (36 9 °C), temperature source Oral, resp  rate 18, height 5' 8\" (1 727 m), weight 89 kg (196 lb 3 4 oz), SpO2 99 %  ,Body mass index is 29 83 kg/m²  Intake/Output Summary (Last 24 hours) at 4/13/2023 0950  Last data filed at 4/13/2023 0645  Gross per 24 hour   Intake 929 03 ml   Output 4269 ml   Net -3339 97 ml       Physical Exam:   General appearance: alert and oriented, in no acute distress  Head: Normocephalic, without obvious abnormality, atraumatic, sclerae anicteric, mucous membranes moist  Neck: no JVD and supple, symmetrical, trachea midline  Lungs: clear to auscultation, no wheezes or rales  Heart:   Mild tachycardia, regular rhythm, S1-S2 normal, no murmur  Abdomen:  non distended, soft, no guarding, no rebound, active bowel sounds  Extremities:   No edema, redness or tenderness in the calves or thighs  Strength is 5/5  Drain in place  Incision c/d/i with dressing in place  Skin: Warm, dry  Nursing notes and vital signs reviewed      Invasive Devices     Peripheral Intravenous Line  Duration           Peripheral IV 04/11/23 Dorsal (posterior); Right Hand 2 days    Peripheral IV 04/13/23 Right Antecubital <1 day          Drain  Duration           Closed/Suction Drain Inferior;Midline Back Bulb 7 Fr   " 1 day                Lab, Imaging and other studies: I have personally reviewed pertinent reports      VTE Pharmacologic Prophylaxis: Heparin  VTE Mechanical Prophylaxis: sequential compression device

## 2023-04-14 LAB
ANION GAP SERPL CALCULATED.3IONS-SCNC: 11 MMOL/L (ref 4–13)
BASOPHILS # BLD AUTO: 0.09 THOUSANDS/ΜL (ref 0–0.1)
BASOPHILS NFR BLD AUTO: 1 % (ref 0–1)
BUN SERPL-MCNC: 11 MG/DL (ref 5–25)
CALCIUM SERPL-MCNC: 8.5 MG/DL (ref 8.4–10.2)
CHLORIDE SERPL-SCNC: 97 MMOL/L (ref 96–108)
CO2 SERPL-SCNC: 28 MMOL/L (ref 21–32)
CREAT SERPL-MCNC: 0.71 MG/DL (ref 0.6–1.3)
EOSINOPHIL # BLD AUTO: 0.07 THOUSAND/ΜL (ref 0–0.61)
EOSINOPHIL NFR BLD AUTO: 1 % (ref 0–6)
ERYTHROCYTE [DISTWIDTH] IN BLOOD BY AUTOMATED COUNT: 16.7 % (ref 11.6–15.1)
GFR SERPL CREATININE-BSD FRML MDRD: 98 ML/MIN/1.73SQ M
GLUCOSE SERPL-MCNC: 108 MG/DL (ref 65–140)
GLUCOSE SERPL-MCNC: 186 MG/DL (ref 65–140)
GLUCOSE SERPL-MCNC: 188 MG/DL (ref 65–140)
GLUCOSE SERPL-MCNC: 309 MG/DL (ref 65–140)
GLUCOSE SERPL-MCNC: 93 MG/DL (ref 65–140)
HCT VFR BLD AUTO: 26.9 % (ref 36.5–49.3)
HGB BLD-MCNC: 8.4 G/DL (ref 12–17)
IMM GRANULOCYTES # BLD AUTO: 0.09 THOUSAND/UL (ref 0–0.2)
IMM GRANULOCYTES NFR BLD AUTO: 1 % (ref 0–2)
LYMPHOCYTES # BLD AUTO: 2.42 THOUSANDS/ΜL (ref 0.6–4.47)
LYMPHOCYTES NFR BLD AUTO: 18 % (ref 14–44)
MAGNESIUM SERPL-MCNC: 1.7 MG/DL (ref 1.9–2.7)
MCH RBC QN AUTO: 27.5 PG (ref 26.8–34.3)
MCHC RBC AUTO-ENTMCNC: 31.2 G/DL (ref 31.4–37.4)
MCV RBC AUTO: 88 FL (ref 82–98)
MONOCYTES # BLD AUTO: 0.94 THOUSAND/ΜL (ref 0.17–1.22)
MONOCYTES NFR BLD AUTO: 7 % (ref 4–12)
NEUTROPHILS # BLD AUTO: 9.99 THOUSANDS/ΜL (ref 1.85–7.62)
NEUTS SEG NFR BLD AUTO: 72 % (ref 43–75)
NRBC BLD AUTO-RTO: 0 /100 WBCS
PHOSPHATE SERPL-MCNC: 3.3 MG/DL (ref 2.3–4.1)
PLATELET # BLD AUTO: 272 THOUSANDS/UL (ref 149–390)
PMV BLD AUTO: 9.9 FL (ref 8.9–12.7)
POTASSIUM SERPL-SCNC: 3.6 MMOL/L (ref 3.5–5.3)
RBC # BLD AUTO: 3.05 MILLION/UL (ref 3.88–5.62)
SODIUM SERPL-SCNC: 136 MMOL/L (ref 135–147)
WBC # BLD AUTO: 13.6 THOUSAND/UL (ref 4.31–10.16)

## 2023-04-14 PROCEDURE — 0T9B70Z DRAINAGE OF BLADDER WITH DRAINAGE DEVICE, VIA NATURAL OR ARTIFICIAL OPENING: ICD-10-PCS | Performed by: NEUROLOGICAL SURGERY

## 2023-04-14 RX ORDER — POLYETHYLENE GLYCOL 3350 17 G/17G
17 POWDER, FOR SOLUTION ORAL 2 TIMES DAILY
Status: DISCONTINUED | OUTPATIENT
Start: 2023-04-14 | End: 2023-04-22 | Stop reason: HOSPADM

## 2023-04-14 RX ORDER — INSULIN LISPRO 100 [IU]/ML
1-6 INJECTION, SOLUTION INTRAVENOUS; SUBCUTANEOUS
Status: DISCONTINUED | OUTPATIENT
Start: 2023-04-14 | End: 2023-04-19

## 2023-04-14 RX ORDER — POTASSIUM CHLORIDE 20 MEQ/1
20 TABLET, EXTENDED RELEASE ORAL ONCE
Status: COMPLETED | OUTPATIENT
Start: 2023-04-14 | End: 2023-04-14

## 2023-04-14 RX ORDER — MAGNESIUM SULFATE HEPTAHYDRATE 40 MG/ML
2 INJECTION, SOLUTION INTRAVENOUS ONCE
Status: COMPLETED | OUTPATIENT
Start: 2023-04-14 | End: 2023-04-14

## 2023-04-14 RX ADMIN — LOSARTAN POTASSIUM 100 MG: 50 TABLET, FILM COATED ORAL at 08:30

## 2023-04-14 RX ADMIN — METHOCARBAMOL 500 MG: 500 TABLET ORAL at 01:20

## 2023-04-14 RX ADMIN — HYDROCHLOROTHIAZIDE 25 MG: 25 TABLET ORAL at 08:30

## 2023-04-14 RX ADMIN — MAGNESIUM SULFATE HEPTAHYDRATE 2 G: 2 INJECTION, SOLUTION INTRAVENOUS at 08:37

## 2023-04-14 RX ADMIN — KETAMINE HYDROCHLORIDE 0.1 MG/KG/HR: 10 INJECTION INTRAMUSCULAR; INTRAVENOUS at 13:28

## 2023-04-14 RX ADMIN — METOPROLOL SUCCINATE 75 MG: 50 TABLET, EXTENDED RELEASE ORAL at 08:30

## 2023-04-14 RX ADMIN — MIRTAZAPINE 30 MG: 15 TABLET, FILM COATED ORAL at 21:26

## 2023-04-14 RX ADMIN — ACETAMINOPHEN 325MG 975 MG: 325 TABLET ORAL at 08:41

## 2023-04-14 RX ADMIN — SERTRALINE HYDROCHLORIDE 100 MG: 100 TABLET ORAL at 08:16

## 2023-04-14 RX ADMIN — OXYCODONE HYDROCHLORIDE 10 MG: 5 TABLET ORAL at 15:56

## 2023-04-14 RX ADMIN — CLONAZEPAM 1 MG: 1 TABLET ORAL at 16:56

## 2023-04-14 RX ADMIN — INSULIN LISPRO 1 UNITS: 100 INJECTION, SOLUTION INTRAVENOUS; SUBCUTANEOUS at 21:33

## 2023-04-14 RX ADMIN — POLYETHYLENE GLYCOL 3350 17 G: 17 POWDER, FOR SOLUTION ORAL at 17:04

## 2023-04-14 RX ADMIN — TAMSULOSIN HYDROCHLORIDE 0.4 MG: 0.4 CAPSULE ORAL at 16:20

## 2023-04-14 RX ADMIN — GABAPENTIN 300 MG: 300 CAPSULE ORAL at 08:16

## 2023-04-14 RX ADMIN — POLYETHYLENE GLYCOL 3350 17 G: 17 POWDER, FOR SOLUTION ORAL at 08:17

## 2023-04-14 RX ADMIN — NIFEDIPINE 120 MG: 30 TABLET, FILM COATED, EXTENDED RELEASE ORAL at 08:30

## 2023-04-14 RX ADMIN — METHOCARBAMOL 500 MG: 500 TABLET ORAL at 11:57

## 2023-04-14 RX ADMIN — FOLIC ACID 2000 MCG: 1 TABLET ORAL at 08:15

## 2023-04-14 RX ADMIN — HEPARIN SODIUM 5000 UNITS: 5000 INJECTION INTRAVENOUS; SUBCUTANEOUS at 08:24

## 2023-04-14 RX ADMIN — METHOCARBAMOL 500 MG: 500 TABLET ORAL at 18:02

## 2023-04-14 RX ADMIN — INSULIN LISPRO 3 UNITS: 100 INJECTION, SOLUTION INTRAVENOUS; SUBCUTANEOUS at 16:20

## 2023-04-14 RX ADMIN — EMPAGLIFLOZIN 10 MG: 10 TABLET, FILM COATED ORAL at 08:30

## 2023-04-14 RX ADMIN — LIDOCAINE 2 PATCH: 50 PATCH TOPICAL at 08:39

## 2023-04-14 RX ADMIN — ACETAMINOPHEN 325MG 975 MG: 325 TABLET ORAL at 17:01

## 2023-04-14 RX ADMIN — METHOCARBAMOL 500 MG: 500 TABLET ORAL at 05:12

## 2023-04-14 RX ADMIN — DOCUSATE SODIUM 100 MG: 100 CAPSULE, LIQUID FILLED ORAL at 08:16

## 2023-04-14 RX ADMIN — ACETAMINOPHEN 325MG 975 MG: 325 TABLET ORAL at 01:20

## 2023-04-14 RX ADMIN — OXYCODONE HYDROCHLORIDE 5 MG: 5 TABLET ORAL at 21:26

## 2023-04-14 RX ADMIN — HEPARIN SODIUM 5000 UNITS: 5000 INJECTION INTRAVENOUS; SUBCUTANEOUS at 16:20

## 2023-04-14 RX ADMIN — Medication 5000 UNITS: at 08:15

## 2023-04-14 RX ADMIN — OXYCODONE HYDROCHLORIDE 10 MG: 5 TABLET ORAL at 05:16

## 2023-04-14 RX ADMIN — GABAPENTIN 300 MG: 300 CAPSULE ORAL at 17:04

## 2023-04-14 RX ADMIN — OXYCODONE HYDROCHLORIDE 10 MG: 5 TABLET ORAL at 09:32

## 2023-04-14 RX ADMIN — HEPARIN SODIUM 5000 UNITS: 5000 INJECTION INTRAVENOUS; SUBCUTANEOUS at 01:21

## 2023-04-14 RX ADMIN — INSULIN LISPRO 1 UNITS: 100 INJECTION, SOLUTION INTRAVENOUS; SUBCUTANEOUS at 10:58

## 2023-04-14 RX ADMIN — DOCUSATE SODIUM 100 MG: 100 CAPSULE, LIQUID FILLED ORAL at 17:04

## 2023-04-14 RX ADMIN — POTASSIUM CHLORIDE 20 MEQ: 1500 TABLET, EXTENDED RELEASE ORAL at 08:34

## 2023-04-14 RX ADMIN — SENNOSIDES 8.6 MG: 8.6 TABLET, FILM COATED ORAL at 08:16

## 2023-04-14 NOTE — ASSESSMENT & PLAN NOTE
Lab Results   Component Value Date    HGBA1C 6 3 (H) 03/14/2023       Recent Labs     04/13/23  2221 04/14/23  0637 04/14/23  1034 04/14/23  1620   POCGLU 145* 108 188* 309*       Blood Sugar Average: Last 72 hrs:  (P) 195 6     · Home regiment: jardiance, lantus and glipizide  · Jardiance restarted   · Also with SSI, algorithm increased to 3  · Carb controlled diet

## 2023-04-14 NOTE — PLAN OF CARE
Problem: Prexisting or High Potential for Compromised Skin Integrity  Goal: Skin integrity is maintained or improved  Description: INTERVENTIONS:  - Identify patients at risk for skin breakdown  - Assess and monitor skin integrity  - Assess and monitor nutrition and hydration status  - Monitor labs   - Assess for incontinence   - Turn and reposition patient  - Assist with mobility/ambulation  - Relieve pressure over bony prominences  - Avoid friction and shearing  - Provide appropriate hygiene as needed including keeping skin clean and dry  - Evaluate need for skin moisturizer/barrier cream  - Collaborate with interdisciplinary team   - Patient/family teaching  - Consider wound care consult   Outcome: Progressing     Problem: MOBILITY - ADULT  Goal: Maintain or return to baseline ADL function  Description: INTERVENTIONS:  -  Assess patient's ability to carry out ADLs; assess patient's baseline for ADL function and identify physical deficits which impact ability to perform ADLs (bathing, care of mouth/teeth, toileting, grooming, dressing, etc )  - Assess/evaluate cause of self-care deficits   - Assess range of motion  - Assess patient's mobility; develop plan if impaired  - Assess patient's need for assistive devices and provide as appropriate  - Encourage maximum independence but intervene and supervise when necessary  - Involve family in performance of ADLs  - Assess for home care needs following discharge   - Consider OT consult to assist with ADL evaluation and planning for discharge  - Provide patient education as appropriate  Outcome: Progressing  Goal: Maintains/Returns to pre admission functional level  Description: INTERVENTIONS:  - Perform BMAT or MOVE assessment daily    - Set and communicate daily mobility goal to care team and patient/family/caregiver  - Collaborate with rehabilitation services on mobility goals if consulted  - Perform Range of Motion 3 times a day    - Reposition patient every 2 hours   - Dangle patient 3 times a day  - Stand patient 3 times a day  - Ambulate patient 3 times a day  - Out of bed to chair 3 times a day   - Out of bed for meals 3 times a day  - Out of bed for toileting  - Record patient progress and toleration of activity level   Outcome: Progressing     Problem: PAIN - ADULT  Goal: Verbalizes/displays adequate comfort level or baseline comfort level  Description: Interventions:  - Encourage patient to monitor pain and request assistance  - Assess pain using appropriate pain scale  - Administer analgesics based on type and severity of pain and evaluate response  - Implement non-pharmacological measures as appropriate and evaluate response  - Consider cultural and social influences on pain and pain management  - Notify physician/advanced practitioner if interventions unsuccessful or patient reports new pain  Outcome: Progressing     Problem: INFECTION - ADULT  Goal: Absence or prevention of progression during hospitalization  Description: INTERVENTIONS:  - Assess and monitor for signs and symptoms of infection  - Monitor lab/diagnostic results  - Monitor all insertion sites, i e  indwelling lines, tubes, and drains  - Monitor endotracheal if appropriate and nasal secretions for changes in amount and color  - Tower appropriate cooling/warming therapies per order  - Administer medications as ordered  - Instruct and encourage patient and family to use good hand hygiene technique  - Identify and instruct in appropriate isolation precautions for identified infection/condition  Outcome: Progressing  Goal: Absence of fever/infection during neutropenic period  Description: INTERVENTIONS:  - Monitor WBC    Outcome: Progressing     Problem: SAFETY ADULT  Goal: Maintain or return to baseline ADL function  Description: INTERVENTIONS:  -  Assess patient's ability to carry out ADLs; assess patient's baseline for ADL function and identify physical deficits which impact ability to perform ADLs (bathing, care of mouth/teeth, toileting, grooming, dressing, etc )  - Assess/evaluate cause of self-care deficits   - Assess range of motion  - Assess patient's mobility; develop plan if impaired  - Assess patient's need for assistive devices and provide as appropriate  - Encourage maximum independence but intervene and supervise when necessary  - Involve family in performance of ADLs  - Assess for home care needs following discharge   - Consider OT consult to assist with ADL evaluation and planning for discharge  - Provide patient education as appropriate  Outcome: Progressing  Goal: Maintains/Returns to pre admission functional level  Description: INTERVENTIONS:  - Perform BMAT or MOVE assessment daily    - Set and communicate daily mobility goal to care team and patient/family/caregiver  - Collaborate with rehabilitation services on mobility goals if consulted  - Perform Range of Motion 3 times a day  - Reposition patient every 2 hours    - Dangle patient 3 times a day  - Stand patient 3 times a day  - Ambulate patient 3 times a day  - Out of bed to chair 3 times a day   - Out of bed for meals 3 times a day  - Out of bed for toileting  - Record patient progress and toleration of activity level   Outcome: Progressing  Goal: Patient will remain free of falls  Description: INTERVENTIONS:  - Educate patient/family on patient safety including physical limitations  - Instruct patient to call for assistance with activity   - Consult OT/PT to assist with strengthening/mobility   - Keep Call bell within reach  - Keep bed low and locked with side rails adjusted as appropriate  - Keep care items and personal belongings within reach  - Initiate and maintain comfort rounds  - Make Fall Risk Sign visible to staff  - Offer Toileting every 2 Hours, in advance of need  - Initiate/Maintain bed alarm  - Apply yellow socks and bracelet for high fall risk patients  - Consider moving patient to room near nurses station  Outcome: Progressing     Problem: DISCHARGE PLANNING  Goal: Discharge to home or other facility with appropriate resources  Description: INTERVENTIONS:  - Identify barriers to discharge w/patient and caregiver  - Arrange for needed discharge resources and transportation as appropriate  - Identify discharge learning needs (meds, wound care, etc )  - Arrange for interpretive services to assist at discharge as needed  - Refer to Case Management Department for coordinating discharge planning if the patient needs post-hospital services based on physician/advanced practitioner order or complex needs related to functional status, cognitive ability, or social support system  Outcome: Progressing     Problem: Knowledge Deficit  Goal: Patient/family/caregiver demonstrates understanding of disease process, treatment plan, medications, and discharge instructions  Description: Complete learning assessment and assess knowledge base    Interventions:  - Provide teaching at level of understanding  - Provide teaching via preferred learning methods  Outcome: Progressing

## 2023-04-14 NOTE — ASSESSMENT & PLAN NOTE
Lab Results   Component Value Date    HGBA1C 6 3 (H) 03/14/2023       Recent Labs     04/13/23  0642 04/13/23  1127 04/13/23  1617 04/13/23  2221   POCGLU 123 302* 177* 145*       Blood Sugar Average: Last 72 hrs:  (P) 106 [id number]   · Home regiment: jardiance, lantus and glipizide  · Jardiance restarted   · Carb controlled diet

## 2023-04-14 NOTE — TELEMEDICINE
REQUIRED DOCUMENTATION:     1  This service was provided via Telemedicine  2  Provider located at [address] [place] [address]  3  TeleMed provider: Enedina Celis MD   4  Identify all parties in room with patient during tele consult:  Giselle Hawley RN  5  Patient was then informed that this was a Telemedicine visit and that the exam was being conducted confidentially over secure lines  My office door was closed  The patient was notified the following individuals were present in the room Kirk Alonso MD   Patient acknowledged consent and understanding of privacy and security of the Telemedicine visit, and gave us permission to have the assistant stay in the room in order to assist with the history and to conduct the exam   I informed the patient that I have reviewed their record in Epic and presented the opportunity for them to ask any questions regarding the visit today  The patient agreed to participate  Progress Note - Acute Pain Service    Carmen Reyes 74 y o  male MRN: [id number]  Unit/Bed#: -01 SDU Encounter: [id number]      Assessment:   Nubia Britt a 74 y  o  male with a past medical history significant for hepatosteatosis, bipolar 2 disorder, diabetes mellitus complicated by peripheral neuropathy, chronic pain syndrome in the setting of lumbar spinal stenosis with neurogenic claudication and lumbar radiculopathy status post spinal cord stimulator trial with chronic opioid dependence currently on tramadol 50 mg twice daily at home (follows with [facility]), anxiety with benzodiazepine dependence ordered clonazepam 1 mg twice daily at home (follows with [facility]) who presents for surgical management of lumbar spondylosis with spinal stenosis   Patient underwent T12-S1 posterior instrumented fixation fusion, L1-L2 and L4-L5 posterior decompression with bilateral laminectomy and total facetectomy, L2-L3 and L3-L4 posterior decompression with bilateral laminectomy and medial facetectomy, L5-S1 bilateral laminoforaminotomy for decompression on 4/11/2023   Acute pain service has been consulted for postoperative pain management      In terms of substance use history, patient states that he is prescribed tramadol and clonazepam which he takes each twice daily  [facility] states that he used to have a problem with alcohol and smoking cigarettes but he states that he quit on his own in [address] [place] [address] does state that he has 1-2 beers occasionally with his brother but states he is only had alcohol one of the last 30 days  [facility] also has a medical marijuana card however states he does not use this because he did not find this effective for his pain control  Of note, patient reports losing his psychiatrist and therapist recently  He is unable to find these outpatient and is asking for assistance      On evaluation, patient states that his back pain is completely controlled however he continues to have pain that wraps around his bilateral flanks to his infraumbilical region  After speaking with the nurse and patient, it is apparent that he continues to retain urine  Patient states that his pain is relieved by urination    It appears that patient's current pain may be secondary to urinary retention        Plan:   Continue ketamine infusion at 0 1 mg/kg/hr with as needed glycopyrrolate and haloperidol (confirmed medication availability with pharmacy)  Change clonazepam to 1 mg p o  twice daily as needed for anxiety (home med)  Continue acetaminophen 975 mg p o  every 8 hours scheduled  Continue oxycodone 5 mg p o  every 4 hours as needed for moderate pain  Continue oxycodone 10 mg p o  every 4 hours as needed for severe pain  Discontinue intravenous hydromorphone  Continue gabapentin 300 mg p o  twice daily  Continue methocarbamol 500 mg p o  every 6 hours scheduled  Continue lidocaine patch x2 to the bilateral paraspinal lumbar region 12 hours on/12 hours off  Continue naloxone as needed for respiratory depression/opioid reversal  Bowel regimen per primary team to avoid opioid-induced constipation  We will discuss outpatient resources for mental health with [facility]  Recommend trial of Mendoza catheter    APS will continue to follow  Please contact Acute Pain Service - [facility] via [employer] from [phone] with additional questions or concerns  See Stella or Jeannie for additional contacts and after hours information  Pain History  Current pain location(s): Bilateral flanks radiating to infraumbilical region  Pain Scale:   7-10  Quality: Tight, pressure  24 hour history: Over the past 24 hours, patient has continued to have better blood pressure control and is no longer tachycardic  Patient is remained afebrile and is not currently requiring supplemental oxygen  Labs from today significant for creatinine 0 71 with EGFR 98, leukocytosis with WBC 13 60 and absolute neutrophilia, normocytic anemia with hemoglobin 8 4, magnesium 1 7  Opioid requirement previous 24 hours:   Oxycodone 30 mg p o  Meds/Allergies   all current active meds have been reviewed    Allergies   Allergen Reactions   • Abilify [Aripiprazole] Tremor     Shaking     • Cephalexin Rash   • [employer] Allergic Rhinitis   • Pregabalin Tremor     Lyrica - shaking feeling       Objective     Temp:  [97 9 °F (36 6 °C)-98 8 °F (37 1 °C)] 97 9 °F (36 6 °C)  HR:  [] 97  Resp:  [12-38] 20  BP: (117-154)/(55-82) 125/60    Physical Exam  Vitals and nursing note reviewed  Constitutional:       General: He is not in acute distress  Appearance: Normal appearance  HENT:      Head: Normocephalic and atraumatic  Pulmonary:      Effort: Pulmonary effort is normal  No respiratory distress  Skin:     Coloration: Skin is not jaundiced  Neurological:      Mental Status: He is alert and oriented to person, place, and time  Comments: POSS 1   Psychiatric:         Mood and Affect: Mood normal          Behavior: Behavior normal          Thought Content:  Thought content normal  Judgment: Judgment normal          Lab Results:   Results from last 7 days   Lab Units 04/14/23  0450   WBC Thousand/uL 13 60*   HEMOGLOBIN g/dL 8 4*   HEMATOCRIT % 26 9*   PLATELETS Thousands/uL 272      Results from last 7 days   Lab Units 04/14/23  0450 04/13/23  0432 04/12/23  0429   POTASSIUM mmol/L 3 6   < > 3 3*   CHLORIDE mmol/L 97   < > 100   CO2 mmol/L 28   < > 33*   BUN mg/dL 11   < > 11   CREATININE mg/dL 0 71   < > 0 73   CALCIUM mg/dL 8 5   < > 8 5   ALK PHOS U/L  --   --  63   ALT U/L  --   --  16   AST U/L  --   --  31    < > = values in this interval not displayed  Imaging Studies: I have personally reviewed pertinent reports  EKG, Pathology, and Other Studies: I have personally reviewed pertinent reports  Please note that the APS provides consultative services regarding pain management only  With the exception of ketamine and epidural infusions and except when indicated, final decisions regarding starting or changing doses of analgesic medications are at the discretion of the consulting service  Off hours consultation and/or medication management is generally not available      Genoveva Cruz MD  Acute Pain Service

## 2023-04-14 NOTE — ASSESSMENT & PLAN NOTE
· Patient with a history of chronic lower back pain and radiculopathy secondary to lumbar spondylosis and stenosis  He follows with neurosurgery, Dr Leandra Angulo  He has had a spinal cord stimulator trial, which helped some but he continued with lower back pain  · Patient is postop day 3 status post T12-S1 fusion and drain placement   · admitted to ICU postoperative  · Acute pain service consulted, Ketamine infusion for pain control  · Currently patient rating pain 5 out of 10  Stating it is tolerable  · Remains without acute distress  · Continuing prn dilaudid, Oxy  · Completed operative abx   · Cardiopulmonary monitoring  · Routine monitoring neurovascular checks  · PT OT ordered, barrier to discharge   Limited mobility possible concern for retention

## 2023-04-14 NOTE — PROGRESS NOTES
[place]  Progress Note  Name: Carmen Samuels  MRN: [id number]  Unit/Bed#: -01 SDU I Date of Admission: 4/11/2023   Date of Service: 4/14/2023 I Hospital Day: 3    Assessment/Plan   * Spinal stenosis of lumbar region with neurogenic claudication  Assessment & Plan  · Patient with a history of chronic lower back pain and radiculopathy secondary to lumbar spondylosis and stenosis  He follows with neurosurgery, Dr Brown Shaper  He has had a spinal cord stimulator trial, which helped some but he continued with lower back pain  · Patient is postop day 3 status post T12-S1 fusion and drain placement   · admitted to ICU postoperative  · Acute pain service consulted, Ketamine infusion for pain control  · Currently patient rating pain 5 out of 10  Stating it is tolerable  · Not in any acute distress   · He has Dilaudid and oxycodone as needed  · Completed operative abx   · Continue cardiopulmonary monitoring  · Routine monitoring neurovascular checks  · PT OT ordered    Lumbar spondylosis  Assessment & Plan  See pain management plan above    Urinary retention  Assessment & Plan  · Post op urinary retention  · Flomax started   · Straight cathed 3 times with high PV residuals   · Retention protocol  · Will likely require a ball       PONV (postoperative nausea and vomiting)  Assessment & Plan  · Denies N/V on exam  · Zofran PRN    DM (diabetes mellitus) ([facility] )  Assessment & Plan  Lab Results   Component Value Date    HGBA1C 6 3 (H) 03/14/2023       Recent Labs     04/13/23  0642 04/13/23  1127 04/13/23  1617 04/13/23  2221   POCGLU 123 302* 177* 145*       Blood Sugar Average: Last 72 hrs:  (P) 653 [id number]   · Home regiment: jardiance, lantus and glipizide  · Jardiance restarted   · Carb controlled diet    Cirrhosis, alcoholic (HCC)  Assessment & Plan  · LFT not elevated   · Benign abdominal exam    Benzodiazepine dependence ([facility] )  Assessment & Plan  · Home dose of Klonopin restarted              ICU Core Measures     A: Assess, Prevent, and Manage Pain · Has pain been assessed? Yes  · Need for changes to pain regimen? No   B: Both SAT/SAT  · N/A   C: Choice of Sedation · RASS Goal: 0 Alert and Calm  · Need for changes to sedation or analgesia regimen? No   D: Delirium · CAM-ICU: Negative   E: Early Mobility  · Plan for early mobility? Yes   F: Family Engagement · Plan for family engagement today? Yes       Review of Invasive Devices:            Prophylaxis:  VTE VTE covered by:  heparin (porcine), Subcutaneous, 5,000 Units at 04/14/23 0121       Stress Ulcer  not ordered       Subjective   HPI/24hr events: No overnight events  Pain rated a 5 out of 10 in lower back  Continue on pain regimen as per APS  Objective                            Vitals I/O      Most Recent Min/Max in 24hrs   Temp 98 4 °F (36 9 °C) Temp  Min: 98 4 °F (36 9 °C)  Max: 98 8 °F (37 1 °C)   Pulse 87 Pulse  Min: 86  Max: 115   Resp 16 Resp  Min: 12  Max: 38   /59 BP  Min: 117/59  Max: 157/68   O2 Sat 95 % SpO2  Min: 89 %  Max: 99 %      Intake/Output Summary (Last 24 hours) at 4/14/2023 0153  Last data filed at 4/13/2023 2000  Gross per 24 hour   Intake 706 ml   Output 3220 ml   Net -2514 ml         Diet Aj/CHO Controlled; Consistent Carbohydrate Diet Level 2 (5 carb servings/75 grams CHO/meal)     Invasive Monitoring Physical exam   N/A  Physical Exam  HENT:      Mouth/Throat:      Mouth: Mucous membranes are moist    Eyes:      Pupils: Pupils are equal, round, and reactive to light  Cardiovascular:      Rate and Rhythm: Normal rate  Pulses: Normal pulses  Pulmonary:      Effort: Pulmonary effort is normal    Abdominal:      General: Abdomen is flat  Palpations: Abdomen is soft  Skin:     General: Skin is warm  Capillary Refill: Capillary refill takes less than 2 seconds  Neurological:      Mental Status: He is alert  Diagnostic Studies      EKG: NSR  Imaging:   I have personally reviewed pertinent films in PACS     Medications:  Scheduled PRN   acetaminophen, 975 mg, Q8H [facility]  cholecalciferol, 5,000 Units, Daily  docusate sodium, 100 mg, BID  Empagliflozin, 10 mg, QAM  folic acid, 5,480 mcg, Daily  gabapentin, 300 mg, BID  heparin (porcine), 5,000 Units, Q8H [facility]  losartan, 100 mg, QAM   And  hydrochlorothiazide, 25 mg, QAM  insulin lispro, 1-5 Units, 4x Daily (AC & HS)  lidocaine, 2 patch, Daily  methocarbamol, 500 mg, Q6H CHRISTIE  metoprolol succinate, 75 mg, QAM  mirtazapine, 30 mg, HS  NIFEdipine, 120 mg, QAM  senna, 1 tablet, Daily  sertraline, 100 mg, QAM  tamsulosin, 0 4 mg, Daily With Dinner      clonazePAM, 1 mg, BID PRN  glycopyrrolate, 0 2 mg, Q4H PRN  haloperidol lactate, 2 mg, Q30 Min PRN  HYDROmorphone, 0 2 mg, Q4H PRN  hydrOXYzine HCL, 50 mg, Q6H PRN  naloxone, 0 04 mg, Q1MIN PRN  ondansetron, 4 mg, Q6H PRN  oxyCODONE, 10 mg, Q4H PRN  oxyCODONE, 5 mg, Q4H PRN       Continuous    ketamine, 0 1 mg/kg/hr, Last Rate: 0 1 mg/kg/hr (04/13/23 1132)         Labs:    CBC    Recent Labs     04/12/23 0429 04/13/23 0432   WBC 14 52* 12 63*   HGB 9 3* 8 6*   HCT 28 8* 27 4*    254     BMP    Recent Labs     04/12/23 0429 04/13/23 0432   SODIUM 138 137   K 3 3* 3 6    99   CO2 33* 31   AGAP 5 7   BUN 11 10   CREATININE 0 73 0 77   CALCIUM 8 5 8 6       Coags    No recent results     Additional Electrolytes  No recent results       Blood Gas    No recent results  No recent results LFTs  Recent Labs     04/12/23 0429   ALT 16   AST 31   ALKPHOS 63   ALB 3 5   TBILI 0 58       Infectious  No recent results  Glucose  Recent Labs     04/12/23 0429 04/13/23 0432   GLUC 150* [address] [place], [place]ICOL[place]

## 2023-04-14 NOTE — ASSESSMENT & PLAN NOTE
· Patient with a history of chronic lower back pain and radiculopathy secondary to lumbar spondylosis and stenosis  He follows with neurosurgery, Dr Jose E Tamayo  He has had a spinal cord stimulator trial, which helped some but he continued with lower back pain  · Patient is postop day 3 status post T12-S1 fusion and drain placement   · admitted to ICU postoperative  · Acute pain service consulted, Ketamine infusion for pain control  · Currently patient rating pain 5 out of 10  Stating it is tolerable     · Not in any acute distress   · He has Dilaudid and oxycodone as needed  · Completed operative abx   · Continue cardiopulmonary monitoring  · Routine monitoring neurovascular checks  · PT OT ordered

## 2023-04-14 NOTE — PROGRESS NOTES
"Progress Note - Haylee Yi 74 y o  male MRN: [id number]    Unit/Bed#: -01 SDU Encounter: [id number]      Assessment:    POD 3 s/p T12- S2 Lumbar decompression and fusion  -on ketamine gtt  -pain control  -monitor drain output, has been minimal  Will d/c drain this am  -PT/OT  -OOB as tolerated  -APS and CC on board    Urinary retention  -straight cath*4   -hx of BPH  -on flomax  -urinating yesterday but PVRs worse this am  APS recommending ball    ABLA  -most likely from surgery  -Hgb 8 4  -tachycardia, off NC, denies dizziness and cp  -trend labs    DM2, Depression  -SSI,Home meds  -CC on board    Subjective: -  Doing well  Still with significant back pain        Objective:     Vitals: Blood pressure 134/62, pulse 94, temperature 97 9 °F (36 6 °C), temperature source Oral, resp  rate (!) 31, height 5' 8\" (1 727 m), weight 86 kg (189 lb 9 5 oz), SpO2 94 %  ,Body mass index is 28 83 kg/m²  Intake/Output Summary (Last 24 hours) at 4/14/2023 0721  Last data filed at 4/14/2023 0600  Gross per 24 hour   Intake 796 ml   Output 2981 ml   Net -2185 ml       Physical Exam:   General appearance: alert and oriented, in no acute distress  Head: Normocephalic, without obvious abnormality, atraumatic, sclerae anicteric, mucous membranes moist  Neck: no JVD and supple, symmetrical, trachea midline  Lungs: clear to auscultation, no wheezes or rales  Heart:   Mild tachycardia, regular rhythm, S1-S2 normal, no murmur  Abdomen:  non distended, soft, no guarding, no rebound, active bowel sounds  Extremities:   No edema, redness or tenderness in the calves or thighs  Strength is 5/5  Drain in place  Incision c/d/i with dressing in place  Skin: Warm, dry  Nursing notes and vital signs reviewed      Invasive Devices     Peripheral Intravenous Line  Duration           Peripheral IV 04/11/23 Dorsal (posterior); Right Hand 3 days    Peripheral IV 04/13/23 Right Antecubital 1 day          Drain  Duration           " Closed/Suction Drain Inferior;Midline Back Bulb 7 Fr  2 days                Lab, Imaging and other studies: I have personally reviewed pertinent reports      VTE Pharmacologic Prophylaxis: Heparin  VTE Mechanical Prophylaxis: sequential compression device

## 2023-04-14 NOTE — DISCHARGE INSTR - AVS FIRST PAGE
Discharge Instructions  Lumbar decompression and fusion  (laminectomy/laminotomy/foraminotomy/discectomy)      Surgical incisional care:  Maintain dressing in place for 3 days  On postoperative day 3, dressing may be removed and incision may be left open to air  Keep incision clean and dry  Avoid applying creams, lotion or antiseptic to incision area  Check the wound daily  If the incision becomes red, swollen, tender, warm, or has increased drainage please notify physician immediately  If steri-strips are in place, allow them to fall off  If still in place at two week postoperative visit, we will remove them  May shower 3 days after surgery, but do not soak in a tub and no swimming  Incision may be cleaned with water and a mild antimicrobial soap using a clean washcloth  Incision is to be gently patted dry with a clean towel  Continue to change bed linens and pajamas more frequently  Wear clean clothes daily  Staples can be removed 2 weeks after surgery, as long as the incision has nicely healed  It can be done at rehab, if not possible-please make appointment to see surgeon      Activity Restrictions:  No heavy lifting greater than 10lbs and no strenuous activities until cleared  No bending or twisting  No BLTs (bending, lifting, twisting)  Avoid pushing/pulling movements  May walk as tolerated  Encourage at least 4 short walks per day  No driving for at least 2 weeks or until cleared by physician  Postoperative medication:  Take pain medications to relieve incision pain, and muscle relaxants to prevent spasms as directed  Please see after visit summary (AVS) for details  Do not operate heavy machinery or vehicles while taking sedating medications  Use a bowel regimen while on opioids as they induce constipation  Ie  Senokot-S, Miralax, Colace, etc  Increase fiber and water intake  Do not take ibuprofen, Naproxen/Aleve or any NSAID until cleared by surgeon  May take Tylenol instead    If taking Coumadin, Aspirin, or Plavix, you may resume these medications when cleared by Neurosurgery  Please make appointment to see your primary care for better blood sugar control    Follow-up as scheduled for a 2 week incision check  Follow-up as scheduled for 6 week postoperative visit       **Please notify MD if you experience a fever 101F, chills or have increased pain, numbness, tingling, or weakness in your legs and/or bowel/bladder

## 2023-04-14 NOTE — PLAN OF CARE
Problem: Prexisting or High Potential for Compromised Skin Integrity  Goal: Skin integrity is maintained or improved  Description: INTERVENTIONS:  - Identify patients at risk for skin breakdown  - Assess and monitor skin integrity  - Assess and monitor nutrition and hydration status  - Monitor labs   - Assess for incontinence   - Turn and reposition patient  - Assist with mobility/ambulation  - Relieve pressure over bony prominences  - Avoid friction and shearing  - Provide appropriate hygiene as needed including keeping skin clean and dry  - Evaluate need for skin moisturizer/barrier cream  - Collaborate with interdisciplinary team   - Patient/family teaching  - Consider wound care consult   Outcome: Progressing     Problem: MOBILITY - ADULT  Goal: Maintain or return to baseline ADL function  Description: INTERVENTIONS:  -  Assess patient's ability to carry out ADLs; assess patient's baseline for ADL function and identify physical deficits which impact ability to perform ADLs (bathing, care of mouth/teeth, toileting, grooming, dressing, etc )  - Assess/evaluate cause of self-care deficits   - Assess range of motion  - Assess patient's mobility; develop plan if impaired  - Assess patient's need for assistive devices and provide as appropriate  - Encourage maximum independence but intervene and supervise when necessary  - Involve family in performance of ADLs  - Assess for home care needs following discharge   - Consider OT consult to assist with ADL evaluation and planning for discharge  - Provide patient education as appropriate  Outcome: Progressing  Goal: Maintains/Returns to pre admission functional level  Description: INTERVENTIONS:  - Perform BMAT or MOVE assessment daily    - Set and communicate daily mobility goal to care team and patient/family/caregiver     - Collaborate with rehabilitation services on mobility goals if consulted  Problem: PAIN - ADULT  Goal: Verbalizes/displays adequate comfort level or baseline comfort level  Description: Interventions:  - Encourage patient to monitor pain and request assistance  - Assess pain using appropriate pain scale  - Administer analgesics based on type and severity of pain and evaluate response  - Implement non-pharmacological measures as appropriate and evaluate response  - Consider cultural and social influences on pain and pain management  - Notify physician/advanced practitioner if interventions unsuccessful or patient reports new pain  Outcome: Progressing     Problem: INFECTION - ADULT  Goal: Absence or prevention of progression during hospitalization  Description: INTERVENTIONS:  - Assess and monitor for signs and symptoms of infection  - Monitor lab/diagnostic results  - Monitor all insertion sites, i e  indwelling lines, tubes, and drains  - Monitor endotracheal if appropriate and nasal secretions for changes in amount and color  - [place] appropriate cooling/warming therapies per order  - Administer medications as ordered  - Instruct and encourage patient and family to use good hand hygiene technique  - Identify and instruct in appropriate isolation precautions for identified infection/condition  Outcome: Progressing  Goal: Absence of fever/infection during neutropenic period  Description: INTERVENTIONS:  - Monitor WBC    Outcome: Progressing     Problem: SAFETY ADULT  Goal: Maintain or return to baseline ADL function  Description: INTERVENTIONS:  -  Assess patient's ability to carry out ADLs; assess patient's baseline for ADL function and identify physical deficits which impact ability to perform ADLs (bathing, care of mouth/teeth, toileting, grooming, dressing, etc )  - Assess/evaluate cause of self-care deficits   - Assess range of motion  - Assess patient's mobility; develop plan if impaired  - Assess patient's need for assistive devices and provide as appropriate  - Encourage maximum independence but intervene and supervise when necessary  - Involve family in performance of ADLs  - Assess for home care needs following discharge   - Consider OT consult to assist with ADL evaluation and planning for discharge  - Provide patient education as appropriate  Outcome: Progressing  Goal: Maintains/Returns to pre admission functional level  Description: INTERVENTIONS:  - Perform BMAT or MOVE assessment daily    - Set and communicate daily mobility goal to care team and patient/family/caregiver     - Out of bed for toileting  - Record patient progress and toleration of activity level   Outcome: Progressing  Goal: Patient will remain free of falls  Description: INTERVENTIONS:  - Educate patient/family on patient safety including physical limitations  - Instruct patient to call for assistance with activity   - Consult OT/PT to assist with strengthening/mobility   - Keep Call bell within reach  - Keep bed low and locked with side rails adjusted as appropriate  - Keep care items and personal belongings within reach  - Initiate and maintain comfort rounds    - Apply yellow socks and bracelet for high fall risk patients  - Consider moving patient to room near nurses station  Outcome: Progressing     - Out of bed for toileting  - Record patient progress and toleration of activity level   Outcome: Progressing

## 2023-04-14 NOTE — ASSESSMENT & PLAN NOTE
· Post op urinary retention  · Flomax started   · Straight cathed 3 times, ball cath placed 4/14  · Retention protocol

## 2023-04-14 NOTE — CONSULTS
Consultation - Urology   Lisa Hernandez 74 y o  male MRN: [id number]  Unit/Bed#: -01 SDU Encounter: [id number]               Assessment/Plan     Urinary retention  -cont with flomax, ball placement   -decrease opiate use as tolerated as this makes retention worse  -Start bowel regimen to help with constipation as well  -Ambulate the patient  -May have to discharged with ball cath, needs voiding trial-can be done at rehab vs outpatient clinic     S/p T12-S1 Fusion, ABLA, Diabetes  -mngt per primary          History of Present Illness    Admit Date:  4/11/2023  Hospital Day:  3 days  Primary Service:  Neurosurgery  Attending Provider:  Massiel Estrada MD  Physician Requesting Consult: Massiel Estrada MD    HPI: Lisa Hernandez is a 74y o  year old male who presented to the hospital for lumbar fusion  He underwent the surgery on Monday  Pt does have hx of BPH  He had ball placed intra op and had it removed the post surgery on POD 0  He had some rentention issues over the next day and had to be straight cath'ed  He did urinate yesterday with PVRs recorded in 100-200s  This am, the PVRs were worse and he has having some hesitancy with urination, so ball was placed back in  Consults    Review of Systems     CONSTITUTIONAL: Denies any fever, chills, rigors, and weight loss  HEENT: No facial trauma, earache or tinnitus  Denies hearing loss or visual disturbances  CARDIOVASCULAR: No chest pain or palpitations  RESPIRATORY: Denies any cough, hemoptysis, shortness of breath or dyspnea on exertion  GASTROINTESTINAL: No constipation, no diarrhea, no abdominal hernias, no nausea or vomiting  GENITOURINARY: ball in place  NEUROLOGIC:  negative for dizziness, weakness, vertigo, denies headaches  MUSCULOSKELETAL: Denies any muscle or joint pain  SKIN: Denies skin rashes or itching  ENDOCRINE: Denies excessive thirst  Denies intolerance to heat or cold    PSYCHOSOCIAL:  Positive for depression and anxiety Denies any recent memory loss  Historical Information   Past Medical History:   Diagnosis Date   • Anxiety    • Arthritis    • Cancer ([facility] [address] )    • Depression    • Diabetes mellitus ([facility] [address] )    • Hypertension    • Liver disease    • Mitral valve prolapse    • PONV (postoperative nausea and vomiting)    • Thyroid disease      Past Surgical History:   Procedure Laterality Date   • INCISION AND DRAINAGE OF WOUND Left 2022    Procedure: INCISION AND DRAINAGE (I&D) EXTREMITY;  Surgeon: Reynaldo Kerns DPM;  Location:  MAIN OR;  Service: Podiatry   • JOINT REPLACEMENT Left 2022    Left TSA   • TN ARTHRODESIS POSTERIOR/PSTLAT TQ 1NTRSPC LUMBAR Bilateral 2023    Procedure: L1-S1 navigated posterior decompression with instrumented fixation fusion;  Surgeon: Erika Biggs MD;  Location:  MAIN OR;  Service: Neurosurgery   • THYROID SURGERY      remove cancer     Social History   Social History     Substance and Sexual Activity   Alcohol Use Yes    Comment: Socially     Social History     Substance and Sexual Activity   Drug Use Yes   • Frequency: 7 0 times per week   • Types: Marijuana    Comment: Medical     E-Cigarette/Vaping   • E-Cigarette Use Never User      E-Cigarette/Vaping Substances     Social History     Tobacco Use   Smoking Status Former   • Packs/day: 0 25   • Types: Cigarettes   • Quit date:    • Years since quittin 3   Smokeless Tobacco Never   Tobacco Comments    quit      Family History: History reviewed  No pertinent family history      Meds/Allergies   current meds:   Current Facility-Administered Medications   Medication Dose Route Frequency   • acetaminophen (TYLENOL) tablet 975 mg  975 mg Oral Q8H [address]   • cholecalciferol (VITAMIN D3) tablet 5,000 Units  5,000 Units Oral Daily   • clonazePAM (KlonoPIN) tablet 1 mg  1 mg Oral BID PRN   • docusate sodium (COLACE) capsule 100 mg  100 mg Oral BID   • Empagliflozin (JARDIANCE) tablet 10 mg  10 mg Oral QAM   • folic acid (FOLVITE) tablet 2,000 mcg  2,000 mcg Oral Daily   • gabapentin (NEURONTIN) capsule 300 mg  300 mg Oral BID   • glycopyrrolate (ROBINUL) injection 0 2 mg  0 2 mg Intravenous Q4H PRN   • haloperidol lactate (HALDOL) injection 2 mg  2 mg Intramuscular Q30 Min PRN   • heparin (porcine) subcutaneous injection 5,000 Units  5,000 Units Subcutaneous Q8H [facility]   • losartan (COZAAR) tablet 100 mg  100 mg Oral QAM    And   • hydrochlorothiazide (HYDRODIURIL) tablet 25 mg  25 mg Oral QAM   • HYDROmorphone HCl (DILAUDID) injection 0 2 mg  0 2 mg Intravenous Q4H PRN   • hydrOXYzine HCL (ATARAX) tablet 50 mg  50 mg Oral Q6H PRN   • insulin lispro (HumaLOG) 100 units/mL subcutaneous injection 1-5 Units  1-5 Units Subcutaneous 4x Daily (AC & HS)   • ketamine 250 mg (STANDARD CONCENTRATION) IV in sodium chloride 0 9% 250 mL  0 1 mg/kg/hr Intravenous Continuous   • lidocaine (LIDODERM) 5 % patch 2 patch  2 patch Topical Daily   • methocarbamol (ROBAXIN) tablet 500 mg  500 mg Oral Q6H [facility]   • metoprolol succinate (TOPROL-XL) 24 hr tablet 75 mg  75 mg Oral QAM   • mirtazapine (REMERON) tablet 30 mg  30 mg Oral HS   • naloxone (NARCAN) 0 04 mg/mL syringe 0 04 mg  0 04 mg Intravenous Q1MIN PRN   • NIFEdipine (PROCARDIA XL) 24 hr tablet 120 mg  120 mg Oral QAM   • ondansetron (ZOFRAN) injection 4 mg  4 mg Intravenous Q6H PRN   • oxyCODONE (ROXICODONE) IR tablet 10 mg  10 mg Oral Q4H PRN   • oxyCODONE (ROXICODONE) IR tablet 5 mg  5 mg Oral Q4H PRN   • polyethylene glycol (MIRALAX) packet 17 g  17 g Oral BID   • senna (SENOKOT) tablet 8 6 mg  1 tablet Oral Daily   • sertraline (ZOLOFT) tablet 100 mg  100 mg Oral QAM   • tamsulosin (FLOMAX) capsule 0 4 mg  0 4 mg Oral Daily With Dinner         Allergies   Allergen Reactions   • Abilify [Aripiprazole] Tremor     Shaking     • Cephalexin Rash   • Molds [employer] Smuts Allergic Rhinitis   • Pregabalin Tremor     Lyrica - shaking feeling       Objective   Temp:  [97 9 °F (36 6 °C)-98 8 °F (37 1 °C)] 98 2 °F (36 8 °C)  HR:  [81-99] 95  Resp:  [12-38] 23  BP: (117-158)/(56-82) 138/62    Intake/Output Summary (Last 24 hours) at 4/14/2023 1531  Last data filed at 4/14/2023 1400  Gross per 24 hour   Intake 824 85 ml   Output 3133 ml   Net -2308 15 ml       Physical Exam  General appearance: alert and oriented, in no acute distress  Head: Normocephalic, without obvious abnormality, atraumatic, sclerae anicteric, mucous membranes moist  Neck: no JVD and supple, symmetrical, trachea midline  Lungs: clear to auscultation, no wheezes or rales  Heart:   Mild tachycardia, regular rhythm, S1-S2 normal, no murmur  Abdomen:  non distended, soft, no guarding, no rebound, active bowel sounds  Mendoza in place  Extremities:   No edema, redness or tenderness in the calves or thighs  Skin: Warm, dry  Nursing notes and vital signs reviewed        Lab Results:   CBC:   Lab Results   Component Value Date    WBC 13 60 (H) 04/14/2023    HGB 8 4 (L) 04/14/2023    HCT 26 9 (L) 04/14/2023    MCV 88 04/14/2023     04/14/2023    MCH 27 5 04/14/2023    MCHC 31 2 (L) 04/14/2023    RDW 16 7 (H) 04/14/2023    MPV 9 9 04/14/2023    NRBC 0 04/14/2023   , CMP:   Lab Results   Component Value Date    SODIUM 136 04/14/2023    K 3 6 04/14/2023    CL 97 04/14/2023    CO2 28 04/14/2023    BUN 11 04/14/2023    CREATININE 0 71 04/14/2023    CALCIUM 8 5 04/14/2023    EGFR 98 04/14/2023     Imaging Studies: I have personally reviewed pertinent reports  EKG, Pathology, and Other Studies: I have personally reviewed pertinent reports  Counseling / Coordination of Care  Total floor / unit time spent today 15 minutes  Greater than 50% of total time was spent with the patient and / or family counseling and / or coordination of care   A description of the counseling / coordination of care: 30mins

## 2023-04-15 LAB
ANION GAP SERPL CALCULATED.3IONS-SCNC: 11 MMOL/L (ref 4–13)
BUN SERPL-MCNC: 14 MG/DL (ref 5–25)
CALCIUM SERPL-MCNC: 8.9 MG/DL (ref 8.4–10.2)
CHLORIDE SERPL-SCNC: 97 MMOL/L (ref 96–108)
CO2 SERPL-SCNC: 28 MMOL/L (ref 21–32)
CREAT SERPL-MCNC: 0.78 MG/DL (ref 0.6–1.3)
ERYTHROCYTE [DISTWIDTH] IN BLOOD BY AUTOMATED COUNT: 16.9 % (ref 11.6–15.1)
GFR SERPL CREATININE-BSD FRML MDRD: 94 ML/MIN/1.73SQ M
GLUCOSE SERPL-MCNC: 135 MG/DL (ref 65–140)
GLUCOSE SERPL-MCNC: 142 MG/DL (ref 65–140)
GLUCOSE SERPL-MCNC: 177 MG/DL (ref 65–140)
GLUCOSE SERPL-MCNC: 197 MG/DL (ref 65–140)
GLUCOSE SERPL-MCNC: 254 MG/DL (ref 65–140)
HCT VFR BLD AUTO: 26.8 % (ref 36.5–49.3)
HGB BLD-MCNC: 8.4 G/DL (ref 12–17)
MAGNESIUM SERPL-MCNC: 1.9 MG/DL (ref 1.9–2.7)
MCH RBC QN AUTO: 27.5 PG (ref 26.8–34.3)
MCHC RBC AUTO-ENTMCNC: 31.3 G/DL (ref 31.4–37.4)
MCV RBC AUTO: 88 FL (ref 82–98)
PHOSPHATE SERPL-MCNC: 4 MG/DL (ref 2.3–4.1)
PLATELET # BLD AUTO: 344 THOUSANDS/UL (ref 149–390)
PMV BLD AUTO: 10.6 FL (ref 8.9–12.7)
POTASSIUM SERPL-SCNC: 3.5 MMOL/L (ref 3.5–5.3)
RBC # BLD AUTO: 3.06 MILLION/UL (ref 3.88–5.62)
SODIUM SERPL-SCNC: 136 MMOL/L (ref 135–147)
WBC # BLD AUTO: 9.32 THOUSAND/UL (ref 4.31–10.16)

## 2023-04-15 RX ORDER — GABAPENTIN 300 MG/1
300 CAPSULE ORAL 3 TIMES DAILY
Status: DISCONTINUED | OUTPATIENT
Start: 2023-04-15 | End: 2023-04-22 | Stop reason: HOSPADM

## 2023-04-15 RX ORDER — OXYCODONE HYDROCHLORIDE 5 MG/1
10 TABLET ORAL
Status: DISCONTINUED | OUTPATIENT
Start: 2023-04-15 | End: 2023-04-17

## 2023-04-15 RX ORDER — OXYCODONE HYDROCHLORIDE 5 MG/1
5 TABLET ORAL
Status: DISCONTINUED | OUTPATIENT
Start: 2023-04-15 | End: 2023-04-17

## 2023-04-15 RX ORDER — MAGNESIUM SULFATE HEPTAHYDRATE 40 MG/ML
2 INJECTION, SOLUTION INTRAVENOUS ONCE
Status: COMPLETED | OUTPATIENT
Start: 2023-04-15 | End: 2023-04-15

## 2023-04-15 RX ORDER — METHOCARBAMOL 500 MG/1
750 TABLET, FILM COATED ORAL EVERY 6 HOURS SCHEDULED
Status: DISCONTINUED | OUTPATIENT
Start: 2023-04-15 | End: 2023-04-22 | Stop reason: HOSPADM

## 2023-04-15 RX ORDER — POTASSIUM CHLORIDE 20 MEQ/1
20 TABLET, EXTENDED RELEASE ORAL ONCE
Status: COMPLETED | OUTPATIENT
Start: 2023-04-15 | End: 2023-04-15

## 2023-04-15 RX ORDER — METHOCARBAMOL 500 MG/1
500 TABLET, FILM COATED ORAL ONCE
Status: COMPLETED | OUTPATIENT
Start: 2023-04-15 | End: 2023-04-15

## 2023-04-15 RX ORDER — CLONAZEPAM 1 MG/1
1 TABLET ORAL 2 TIMES DAILY
Status: DISCONTINUED | OUTPATIENT
Start: 2023-04-15 | End: 2023-04-22 | Stop reason: HOSPADM

## 2023-04-15 RX ADMIN — ACETAMINOPHEN 325MG 975 MG: 325 TABLET ORAL at 08:32

## 2023-04-15 RX ADMIN — OXYCODONE HYDROCHLORIDE 10 MG: 5 TABLET ORAL at 22:11

## 2023-04-15 RX ADMIN — POTASSIUM CHLORIDE 20 MEQ: 1500 TABLET, EXTENDED RELEASE ORAL at 08:20

## 2023-04-15 RX ADMIN — METHOCARBAMOL 500 MG: 500 TABLET ORAL at 06:17

## 2023-04-15 RX ADMIN — METOPROLOL SUCCINATE 75 MG: 50 TABLET, EXTENDED RELEASE ORAL at 08:18

## 2023-04-15 RX ADMIN — OXYCODONE HYDROCHLORIDE 10 MG: 5 TABLET ORAL at 01:37

## 2023-04-15 RX ADMIN — HYDROMORPHONE HYDROCHLORIDE 0.2 MG: 0.2 INJECTION, SOLUTION INTRAMUSCULAR; INTRAVENOUS; SUBCUTANEOUS at 09:55

## 2023-04-15 RX ADMIN — INSULIN LISPRO 2 UNITS: 100 INJECTION, SOLUTION INTRAVENOUS; SUBCUTANEOUS at 16:42

## 2023-04-15 RX ADMIN — OXYCODONE HYDROCHLORIDE 10 MG: 5 TABLET ORAL at 18:00

## 2023-04-15 RX ADMIN — GABAPENTIN 300 MG: 300 CAPSULE ORAL at 08:19

## 2023-04-15 RX ADMIN — OXYCODONE HYDROCHLORIDE 10 MG: 5 TABLET ORAL at 08:18

## 2023-04-15 RX ADMIN — METHOCARBAMOL 500 MG: 500 TABLET ORAL at 11:42

## 2023-04-15 RX ADMIN — HYDROCHLOROTHIAZIDE 25 MG: 25 TABLET ORAL at 08:20

## 2023-04-15 RX ADMIN — HEPARIN SODIUM 5000 UNITS: 5000 INJECTION INTRAVENOUS; SUBCUTANEOUS at 00:47

## 2023-04-15 RX ADMIN — EMPAGLIFLOZIN 10 MG: 10 TABLET, FILM COATED ORAL at 08:19

## 2023-04-15 RX ADMIN — HEPARIN SODIUM 5000 UNITS: 5000 INJECTION INTRAVENOUS; SUBCUTANEOUS at 08:22

## 2023-04-15 RX ADMIN — LOSARTAN POTASSIUM 100 MG: 50 TABLET, FILM COATED ORAL at 08:20

## 2023-04-15 RX ADMIN — FOLIC ACID 2000 MCG: 1 TABLET ORAL at 08:18

## 2023-04-15 RX ADMIN — OXYCODONE HYDROCHLORIDE 10 MG: 5 TABLET ORAL at 13:19

## 2023-04-15 RX ADMIN — LIDOCAINE 2 PATCH: 50 PATCH TOPICAL at 08:21

## 2023-04-15 RX ADMIN — INSULIN LISPRO 3 UNITS: 100 INJECTION, SOLUTION INTRAVENOUS; SUBCUTANEOUS at 21:32

## 2023-04-15 RX ADMIN — DOCUSATE SODIUM 100 MG: 100 CAPSULE, LIQUID FILLED ORAL at 17:30

## 2023-04-15 RX ADMIN — MAGNESIUM SULFATE HEPTAHYDRATE 2 G: 2 INJECTION, SOLUTION INTRAVENOUS at 08:22

## 2023-04-15 RX ADMIN — ACETAMINOPHEN 325MG 975 MG: 325 TABLET ORAL at 16:41

## 2023-04-15 RX ADMIN — POLYETHYLENE GLYCOL 3350 17 G: 17 POWDER, FOR SOLUTION ORAL at 17:30

## 2023-04-15 RX ADMIN — SERTRALINE HYDROCHLORIDE 100 MG: 100 TABLET ORAL at 08:21

## 2023-04-15 RX ADMIN — MIRTAZAPINE 30 MG: 15 TABLET, FILM COATED ORAL at 22:11

## 2023-04-15 RX ADMIN — HEPARIN SODIUM 5000 UNITS: 5000 INJECTION INTRAVENOUS; SUBCUTANEOUS at 16:41

## 2023-04-15 RX ADMIN — Medication 5000 UNITS: at 08:18

## 2023-04-15 RX ADMIN — DOCUSATE SODIUM 100 MG: 100 CAPSULE, LIQUID FILLED ORAL at 08:19

## 2023-04-15 RX ADMIN — ACETAMINOPHEN 325MG 975 MG: 325 TABLET ORAL at 00:47

## 2023-04-15 RX ADMIN — METHOCARBAMOL 500 MG: 500 TABLET ORAL at 11:14

## 2023-04-15 RX ADMIN — NIFEDIPINE 120 MG: 30 TABLET, FILM COATED, EXTENDED RELEASE ORAL at 08:19

## 2023-04-15 RX ADMIN — METHOCARBAMOL 750 MG: 500 TABLET ORAL at 17:30

## 2023-04-15 RX ADMIN — SENNOSIDES 8.6 MG: 8.6 TABLET, FILM COATED ORAL at 08:19

## 2023-04-15 RX ADMIN — TAMSULOSIN HYDROCHLORIDE 0.4 MG: 0.4 CAPSULE ORAL at 16:41

## 2023-04-15 RX ADMIN — GABAPENTIN 300 MG: 300 CAPSULE ORAL at 22:11

## 2023-04-15 RX ADMIN — POLYETHYLENE GLYCOL 3350 17 G: 17 POWDER, FOR SOLUTION ORAL at 08:22

## 2023-04-15 RX ADMIN — INSULIN LISPRO 1 UNITS: 100 INJECTION, SOLUTION INTRAVENOUS; SUBCUTANEOUS at 11:16

## 2023-04-15 RX ADMIN — CLONAZEPAM 1 MG: 1 TABLET ORAL at 17:30

## 2023-04-15 RX ADMIN — GABAPENTIN 300 MG: 300 CAPSULE ORAL at 16:41

## 2023-04-15 RX ADMIN — METHOCARBAMOL 500 MG: 500 TABLET ORAL at 00:47

## 2023-04-15 RX ADMIN — CLONAZEPAM 1 MG: 1 TABLET ORAL at 06:17

## 2023-04-15 NOTE — TELEMEDICINE
REQUIRED DOCUMENTATION:     1  This service was provided via Telemedicine  2  Provider located at [facility]  3  TeleMed provider: Jimmy Seals MD   4  Identify all parties in room with patient during tele consult:  Nubia Khan RN  5  Patient was then informed that this was a Telemedicine visit and that the exam was being conducted confidentially over secure lines  My office door was closed  No one else was in the room  Patient acknowledged consent and understanding of privacy and security of the Telemedicine visit, and gave us permission to have the assistant stay in the room in order to assist with the history and to conduct the exam   I informed the patient that I have reviewed their record in Epic and presented the opportunity for them to ask any questions regarding the visit today  The patient agreed to participate         Progress Note - Acute Pain Service    Irish Redd 74 y o  male MRN: [id number]  Unit/Bed#: -01 SDU Encounter: [id number]      Assessment:   Principal Problem:    Spinal stenosis of lumbar region with neurogenic claudication  Active Problems:    Benzodiazepine dependence ([address] )    Cirrhosis, alcoholic (HCC)    DM (diabetes mellitus) ([facility] [address] )    Lumbar spondylosis    PONV (postoperative nausea and vomiting)    Urinary retention    Irish Redd is a 74 y o  male  with a past medical history significant for hepatosteatosis, bipolar 2 disorder, diabetes mellitus complicated by peripheral neuropathy, chronic pain syndrome in the setting of lumbar spinal stenosis with neurogenic claudication and lumbar radiculopathy status post spinal cord stimulator trial with chronic opioid dependence currently on tramadol 50 mg twice daily at home (follows with [facility]), anxiety with benzodiazepine dependence ordered clonazepam 1 mg twice daily at home (follows with [facility]) who presents for surgical management of lumbar spondylosis with spinal stenosis   Patient underwent T12-S1 posterior instrumented fixation fusion, L1-L2 and L4-L5 posterior decompression with bilateral laminectomy and total facetectomy, L2-L3 and L3-L4 posterior decompression with bilateral laminectomy and medial facetectomy, L5-S1 bilateral laminoforaminotomy for decompression on 4/11/2023   Acute pain service has been consulted for postoperative pain management  Today, patient is continuing to have severe back and hip pain despite the interventions discussed yesterday  Patient forgot to request clonazepam which he takes BID at home regularly as it was PRN and noticed severe anxiety and with that increased pain  We discussed making this medications scheduled as well as discontinuation of the ketamine gtt as this may worsen his symptoms and we were getting close to planned discontinuation anyways  Patient expressed understanding and was in agreement  In addition, with his increased tightness and spasm pains we will increase his robaxin  He reports trialing gabapentin 100mg BID at home but it did not help in the past, I discussed the various doses and frequency and he was in agreement with the increase of the gabapentin from 300mg BID to TID dosing  He denies any over sedation or nausea/vomiting with his other oral medications at this time  The oxycodone has been helping but does not seem to last long enough for the 4 hr window unfortunately  Plan:    - modify oxycodone 5mg/10mg PO from q4h to q3h PRN moderate/severe pain   - continue dilaudid 0 2mg IV q4h PRN     Multimodal Analgesia:   - discontinue ketamine gtt   - increase robaxin to 750mg PO q6h elia from 500mg   - increase gabapentin to 300mg TID from BID   - modify home clonazepam from BID PRN to BID scheduled   - continue lidocaine 5% patch 12hr on, 12hr off    Bowel Regimen:  - Docusate (Colace) 100 mg PO twice daily  - Polyethylene glycol (Miralax) 17g PO once daily PRN    APS will continue to follow   Please contact Acute Pain Service - [facility] via [employer] from [phone] with additional questions or concerns  See Stella Dennis for additional contacts and after hours information      Pain History  Current pain location(s): back  Pain Scale:   10/10  Quality: aching, tightness, squeezing  24 hour history: see assessment    Opioid requirement previous 24 hours: oxycodone 35mg PO, dilaudid 0 2mg IV    Meds/Allergies   all current active meds have been reviewed and current meds:   Current Facility-Administered Medications   Medication Dose Route Frequency   • acetaminophen (TYLENOL) tablet 975 mg  975 mg Oral Q8H [address]   • cholecalciferol (VITAMIN D3) tablet 5,000 Units  5,000 Units Oral Daily   • clonazePAM (KlonoPIN) tablet 1 mg  1 mg Oral BID   • docusate sodium (COLACE) capsule 100 mg  100 mg Oral BID   • Empagliflozin (JARDIANCE) tablet 10 mg  10 mg Oral QAM   • folic acid (FOLVITE) tablet 2,000 mcg  2,000 mcg Oral Daily   • gabapentin (NEURONTIN) capsule 300 mg  300 mg Oral BID   • glycopyrrolate (ROBINUL) injection 0 2 mg  0 2 mg Intravenous Q4H PRN   • haloperidol lactate (HALDOL) injection 2 mg  2 mg Intramuscular Q30 Min PRN   • heparin (porcine) subcutaneous injection 5,000 Units  5,000 Units Subcutaneous Q8H [address]   • losartan (COZAAR) tablet 100 mg  100 mg Oral QAM    And   • hydrochlorothiazide (HYDRODIURIL) tablet 25 mg  25 mg Oral QAM   • HYDROmorphone HCl (DILAUDID) injection 0 2 mg  0 2 mg Intravenous Q4H PRN   • hydrOXYzine HCL (ATARAX) tablet 50 mg  50 mg Oral Q6H PRN   • insulin lispro (HumaLOG) 100 units/mL subcutaneous injection 1-6 Units  1-6 Units Subcutaneous 4x Daily (AC & HS)   • ketamine 250 mg (STANDARD CONCENTRATION) IV in sodium chloride 0 9% 250 mL  0 1 mg/kg/hr Intravenous Continuous   • lidocaine (LIDODERM) 5 % patch 2 patch  2 patch Topical Daily   • methocarbamol (ROBAXIN) tablet 500 mg  500 mg Oral Once   • methocarbamol (ROBAXIN) tablet 750 mg  750 mg Oral Q6H [address]   • metoprolol succinate (TOPROL-XL) 24 hr tablet 75 mg  75 mg Oral QAM   • mirtazapine (REMERON) tablet 30 mg  30 mg Oral HS   • naloxone (NARCAN) 0 04 mg/mL syringe 0 04 mg  0 04 mg Intravenous Q1MIN PRN   • NIFEdipine (PROCARDIA XL) 24 hr tablet 120 mg  120 mg Oral QAM   • ondansetron (ZOFRAN) injection 4 mg  4 mg Intravenous Q6H PRN   • oxyCODONE (ROXICODONE) IR tablet 10 mg  10 mg Oral Q4H PRN   • oxyCODONE (ROXICODONE) IR tablet 5 mg  5 mg Oral Q4H PRN   • polyethylene glycol (MIRALAX) packet 17 g  17 g Oral BID   • senna (SENOKOT) tablet 8 6 mg  1 tablet Oral Daily   • sertraline (ZOLOFT) tablet 100 mg  100 mg Oral QAM   • tamsulosin (FLOMAX) capsule 0 4 mg  0 4 mg Oral Daily With Dinner       Allergies   Allergen Reactions   • Abilify [Aripiprazole] Tremor     Shaking     • Cephalexin Rash   • [employer] Allergic Rhinitis   • Pregabalin Tremor     Lyrica - shaking feeling       Objective     Temp:  [97 5 °F (36 4 °C)-98 2 °F (36 8 °C)] 97 8 °F (36 6 °C)  HR:  [] 99  Resp:  [17-52] 17  BP: (115-159)/(53-74) 120/66    Physical Exam  Vitals and nursing note reviewed  Constitutional:       Appearance: Normal appearance  He is not ill-appearing, toxic-appearing or diaphoretic  Pulmonary:      Effort: Pulmonary effort is normal       Comments: O2 via NC  Neurological:      Mental Status: He is alert and oriented to person, place, and time  Mental status is at baseline  Psychiatric:         Mood and Affect: Mood normal          Behavior: Behavior normal          Thought Content:  Thought content normal          Judgment: Judgment normal          Lab Results:   Results from last 7 days   Lab Units 04/15/23  0459   WBC Thousand/uL 9 32   HEMOGLOBIN g/dL 8 4*   HEMATOCRIT % 26 8*   PLATELETS Thousands/uL 344      Results from last 7 days   Lab Units 04/15/23  0459 04/13/23  0432 04/12/23  0429   POTASSIUM mmol/L 3 5   < > 3 3*   CHLORIDE mmol/L 97   < > 100   CO2 mmol/L 28   < > 33*   BUN mg/dL 14   < > 11   CREATININE mg/dL 0 78   < > 0 73   CALCIUM mg/dL 8 9   < > 8 5   ALK PHOS U/L  --   --  63   ALT U/L  --   --  16   AST U/L  --   --  31    < > = values in this interval not displayed  Imaging Studies: I have personally reviewed pertinent reports  EKG, Pathology, and Other Studies: I have personally reviewed pertinent reports  Counseling / Coordination of Care  Total floor / unit time spent today 15 minutes  Greater than 50% of total time was spent with the patient and / or family counseling and / or coordination of care  Please note that the APS provides consultative services regarding pain management only  With the exception of ketamine and epidural infusions and except when indicated, final decisions regarding starting or changing doses of analgesic medications are at the discretion of the consulting service  Off hours consultation and/or medication management is generally not available      Sirena Vásquez MD  Acute Pain Service

## 2023-04-15 NOTE — PROGRESS NOTES
Progress Note - Neurosurgery   Bonita Dunne 74 y o  male MRN: [id number]  Unit/Bed#: -01 SDU Encounter: [id number]    Assessment/Plan:    POD 4 s/p T12- S2 Lumbar decompression and fusion  -appreciate pain management assistance- ketamine gtt stopped this AM  -pain control- patient continues to experience R sided muscle pain/spasms    -drain d/c yesterday  -PT/OT- will need rehab  -OOB as tolerated- discussed w/patient importance of ambulation and working with PT  -OK to transfer to med/surg now that ketamine gtt was been discontinued      Urinary retention  -ball in place  -appreciate urology rec's   -hx of BPH  -on flomax     ABLA  -most likely from surgery, Hgb remains stable 8 4 today  -Hgb 8 4 yesterday  -VS remain stable   -trend labs     DM2, Depression  -SSI,Home meds  -CC on board    Subjective/Objective   Chief Complaint: Pain    Subjective: Patient seen and examined  Reports persistent R sided back pain which he describes as a muscle spasm  Denies chest pain or SOB  Motor/sensory intact of the bilateral lower extremities  Denies paresthesias or numbness  Objective:      Intake/Output                 04/15/23 0701 - 04/16/23 0700     [id number] 5072-2191 Total              Intake    P O   120  -- 120    I V   42 2  -- 42 2    Total Intake 162 2 -- 162 2       Output    Urine  680  -- 680    Output (mL) (Urethral Catheter 16 Fr ) 680 -- 680    Total Output 680 -- 680       Net I/O     -517 9 -- -517 9          Invasive Devices     Peripheral Intravenous Line  Duration           Peripheral IV 04/13/23 Right Antecubital 2 days    Peripheral IV 04/15/23 Right Forearm <1 day          Drain  Duration           Urethral Catheter 16 Fr  1 day                Physical Exam:   General appearance: alert and oriented, in no acute distress  Head: Normocephalic, without obvious abnormality, atraumatic, sclerae anicteric, mucous membranes moist  Neck: no JVD and supple, symmetrical, trachea midline  Lungs: "clear to auscultation, no wheezes or rales  Heart:   Mild tachycardia, regular rhythm, S1-S2 normal, no murmur  Abdomen:  non distended, soft, no guarding, no rebound, active bowel sounds  Extremities:   No edema, redness or tenderness in the calves or thighs  Strength is 5/5  Incision c/d/i with dressing in place  Skin: Warm, dry  Nursing notes and vital signs reviewed    Vitals: Blood pressure 116/61, pulse 96, temperature 97 6 °F (36 4 °C), temperature source Oral, resp  rate 20, height 5' 8\" (1 727 m), weight 87 4 kg (192 lb 10 9 oz), SpO2 100 %  ,Body mass index is 29 3 kg/m²  Hemodynamic Monitoring: MAP:  , CPP:  , CVP:  , ICP Mean:      Lab Results: I have personally reviewed pertinent results  Imaging Studies: I have personally reviewed pertinent reports  EKG, Pathology, and Other Studies: I have personally reviewed pertinent reports        VTE Pharmacologic Prophylaxis: Sequential compression device (Venodyne)     VTE Mechanical Prophylaxis: sequential compression device    "

## 2023-04-15 NOTE — PROGRESS NOTES
[place]  Critical Care Progress Note  Name: Hedy Alvares  MRN: [id number]  Unit/Bed#: -01 SDU I Date of Admission: 4/11/2023   Date of Service: 4/15/2023 I Hospital Day: 4    Assessment/Plan   * Spinal stenosis of lumbar region with neurogenic claudication  Assessment & Plan  · Patient with a history of chronic lower back pain and radiculopathy secondary to lumbar spondylosis and stenosis  He follows with neurosurgery, Dr Magaña Favorite  He has had a spinal cord stimulator trial, which helped some but he continued with lower back pain  · Patient is postop day 3 status post T12-S1 fusion and drain placement   · admitted to ICU postoperative  · Acute pain service consulted, Ketamine infusion for pain control  · Currently patient rating pain 5 out of 10  Stating it is tolerable  · Remains without acute distress  · Continuing prn dilaudid, Oxy  · Completed operative abx   · Cardiopulmonary monitoring  · Routine monitoring neurovascular checks  · PT OT ordered, barrier to discharge   Limited mobility possible concern for retention    Lumbar spondylosis  Assessment & Plan  · See pain management plan above    Urinary retention  Assessment & Plan  · Post op urinary retention  · Flomax started   · Straight cathed 3 times, ball cath placed 4/14  · Retention protocol    PONV (postoperative nausea and vomiting)  Assessment & Plan  · Without nausea and vomiting currently  · Zofran PRN (0 doses since admission)    DM (diabetes mellitus) ([address] )  Assessment & Plan  Lab Results   Component Value Date    HGBA1C 6 3 (H) 03/14/2023       Recent Labs     04/13/23  2221 04/14/23  0637 04/14/23  1034 04/14/23  1620   POCGLU 145* 108 188* 309*       Blood Sugar Average: Last 72 hrs:  (P) 195 6     · Home regiment: jardiance, lantus and glipizide  · Jardiance restarted   · Also with SSI, algorithm increased to 3  · Carb controlled diet    Cirrhosis, alcoholic (HCC)  Assessment & Plan  · LFT within normal limits  · Benign abdominal exam    Benzodiazepine dependence ([facility] [address] )  Assessment & Plan  · Home Klonopin restarted     ICU Core Measures     A: Assess, Prevent, and Manage Pain · Has pain been assessed? Yes  · Need for changes to pain regimen? No   B: Both SAT/SAT  · N/A   C: Choice of Sedation · RASS Goal: 0 Alert and Calm  · Need for changes to sedation or analgesia regimen? No   D: Delirium · CAM-ICU: Negative   E: Early Mobility  · Plan for early mobility? Yes   F: Family Engagement · Plan for family engagement today? Yes     Review of Invasive Devices: Ball Plan: Previous voiding trial failed, urology consulted  Prophylaxis:  VTE VTE covered by:  heparin (porcine), Subcutaneous, 5,000 Units at 04/15/23 0047       Stress Ulcer  not ordered       Subjective   HPI/24hr events: 67 YOM POD 4 T12-S2 decompression and fusion  Remains on Ketamin infusion with APS following  Reports pain better controlled since ball catheter inserted for urinary retention  No overnight events  Plan for mobilization with PT, continuing PRN adjunct medications  Review of Systems   Constitutional: Negative  HENT: Negative  Eyes: Negative  Respiratory: Negative for chest tightness and shortness of breath  Cardiovascular: Negative for chest pain  Gastrointestinal: Negative for diarrhea, nausea and vomiting  Endocrine: Negative  Genitourinary: Negative  Musculoskeletal: Positive for back pain  Postoperative pain   Skin: Negative  Allergic/Immunologic: Negative  Neurological: Negative  Hematological: Negative  Psychiatric/Behavioral: Negative           Objective                            Vitals I/O      Most Recent Min/Max in 24hrs   Temp 97 8 °F (36 6 °C) Temp  Min: 97 8 °F (36 6 °C)  Max: 98 2 °F (36 8 °C)   Pulse 86 Pulse  Min: 81  Max: 105   Resp (!) 24 Resp  Min: 14  Max: 31   /59 BP  Min: 115/59  Max: 159/72   O2 Sat 99 % SpO2  Min: 94 %  Max: 100 %      Intake/Output Summary (Last 24 hours) at 4/15/2023 0106  Last data filed at 4/14/2023 2206  Gross per 24 hour   Intake 1139 ml   Output 2455 ml   Net -1316 ml         Diet Aj/CHO Controlled; Consistent Carbohydrate Diet Level 2 (5 carb servings/75 grams CHO/meal)     Invasive Monitoring Physical exam   N/a Physical Exam  Vitals and nursing note reviewed  Constitutional:       General: He is not in acute distress  HENT:      Head: Normocephalic and atraumatic  Right Ear: External ear normal       Left Ear: External ear normal       Nose: Nose normal       Mouth/Throat:      Mouth: Mucous membranes are dry  Pharynx: Oropharynx is clear  Eyes:      Pupils: Pupils are equal, round, and reactive to light  Cardiovascular:      Rate and Rhythm: Normal rate and regular rhythm  Pulses: Normal pulses  Heart sounds: Normal heart sounds  Pulmonary:      Effort: Pulmonary effort is normal       Breath sounds: Normal breath sounds  Abdominal:      General: Abdomen is flat  Palpations: Abdomen is soft  Skin:     General: Skin is warm and dry  Capillary Refill: Capillary refill takes less than 2 seconds  Neurological:      Mental Status: He is alert and oriented to person, place, and time  Diagnostic Studies    EKG: NSR rate 78  Imaging: None new  I have personally reviewed pertinent reports         Medications:  Scheduled PRN   acetaminophen, 975 mg, Q8H [facility]  cholecalciferol, 5,000 Units, Daily  docusate sodium, 100 mg, BID  Empagliflozin, 10 mg, QAM  folic acid, 7,402 mcg, Daily  gabapentin, 300 mg, BID  heparin (porcine), 5,000 Units, Q8H [facility]  losartan, 100 mg, QAM   And  hydrochlorothiazide, 25 mg, QAM  insulin lispro, 1-6 Units, 4x Daily (AC & HS)  lidocaine, 2 patch, Daily  methocarbamol, 500 mg, Q6H CHRISTIE  metoprolol succinate, 75 mg, QAM  mirtazapine, 30 mg, HS  NIFEdipine, 120 mg, QAM  polyethylene glycol, 17 g, BID  senna, 1 tablet, Daily  sertraline, 100 mg, QAM  tamsulosin, 0 4 mg, Daily With Dinner      clonazePAM, 1 mg, BID PRN  glycopyrrolate, 0 2 mg, Q4H PRN  haloperidol lactate, 2 mg, Q30 Min PRN  HYDROmorphone, 0 2 mg, Q4H PRN  hydrOXYzine HCL, 50 mg, Q6H PRN  naloxone, 0 04 mg, Q1MIN PRN  ondansetron, 4 mg, Q6H PRN  oxyCODONE, 10 mg, Q4H PRN  oxyCODONE, 5 mg, Q4H PRN       Continuous    ketamine, 0 1 mg/kg/hr, Last Rate: 0 1 mg/kg/hr (04/14/23 1328)         Labs:    CBC    Recent Labs     04/13/23 0432 04/14/23  0450   WBC 12 63* 13 60*   HGB 8 6* 8 4*   HCT 27 4* 26 9*    272     BMP    Recent Labs     04/13/23 0432 04/14/23  0450   SODIUM 137 136   K 3 6 3 6   CL 99 97   CO2 31 28   AGAP 7 11   BUN 10 11   CREATININE 0 77 0 71   CALCIUM 8 6 8 5       Coags    No recent results     Additional Electrolytes  Recent Labs     04/14/23  0450   MG 1 7*   PHOS 3 3          Blood Gas    No recent results  No recent results LFTs  No recent results    Infectious  No recent results  Glucose  Recent Labs     04/13/23  0432 04/14/23  0450   GLUC [address] Alyssa Holt

## 2023-04-15 NOTE — PHYSICAL THERAPY NOTE
"                                                                                  PHYSICAL THERAPY NOTE       04/15/23 0910   PT Last Visit   PT Visit Date 04/15/23   Note Type   Note Type Treatment   Pain Assessment   Pain Assessment Tool 0-10   Pain Score 10 - Worst Possible Pain   Hospital Pain Intervention(s) Medication (See MAR); Repositioned; Ambulation/increased activity   Restrictions/Precautions   Other Precautions Pain; Fall Risk;Telemetry;Multiple lines; Chair Alarm;Spinal precautions   General   Chart Reviewed Yes   Response to Previous Treatment Patient with no complaints from previous session  Cognition   Overall Cognitive Status WFL   Arousal/Participation Alert   Attention Within functional limits   Orientation Level Oriented X4   Memory Within functional limits   Following Commands Follows all commands and directions without difficulty   Subjective   Subjective \"I'm in too much pain to walk  \"   Bed Mobility   Supine to Sit 4  Minimal assistance   Additional items Bedrails;HOB elevated; Increased time required;Verbal cues  (Verbal cues for log roll technique)   Transfers   Sit to Stand 4  Minimal assistance   Additional items Assist x 1; Armrests; Increased time required;Verbal cues   Stand to Sit 4  Minimal assistance   Additional items Assist x 1; Armrests; Increased time required;Verbal cues   Additional Comments Repeated verbal and tactile cues for hand placement  Poor carryover  2 transfers performed during session as patient attempted to use commode during session  Ambulation/Elevation   Gait pattern Forward Flexion;Decreased foot clearance; Antalgic  (Increased distance from RW)   Gait Assistance 4  Minimal assist   Additional items Assist x 1;Verbal cues; Tactile cues  (Assist of another for line management)   Assistive Device Rolling walker   Distance 3ftx2  (bed to commode and commode to chair)   Balance   Static Sitting Fair +   Dynamic Sitting Fair   Static Standing Fair   Dynamic Standing Fair " Ambulatory Fair   Endurance Deficit   Endurance Deficit Yes   Endurance Deficit Description Limited by pain   Activity Tolerance   Activity Tolerance Patient limited by pain   Nurse Made Aware RNCody present for session  Assessment   Prognosis Guarded   Problem List Decreased strength;Decreased endurance; Impaired balance;Decreased mobility;Obesity;Pain   Assessment Patient was received supine in bed with RN present  Patient requesting to use commode  High pain level at 10/10 despite being medicated ~30 min prior  Patient required education on spinal precautions, log roll technique, proper transfer technique with proper hand placement  Encouraged patient to ambulate in room, patient declined due to pain  He required min A for bed mobility, transfers and a short ambulation distance  Heavy reliance on RW  Patient is extremely limited by pain and is not at his functional baseline  Recommending level 2  The patient's AM-[facility] Basic Mobility Inpatient Short Form Raw Score is 15  A Raw score of less than or equal to 17 suggests the patient may benefit from discharge to post-acute rehabilitation services  Please also refer to the recommendation of the Physical Therapist for safe discharge planning  Barriers to Discharge Inaccessible home environment;Decreased caregiver support   Goals   Patient Goals To have less pain   STG Expiration Date 04/22/23   Plan   Treatment/Interventions Functional transfer training;LE strengthening/ROM; Therapeutic exercise; Endurance training;Patient/family training;Equipment eval/education; Bed mobility;Gait training;Spoke to nursing   Progress Slow progress, medical status limitations   PT Frequency 3-5x/wk   Recommendation   UB Rehab Discharge Recommendation (PT/OT) Level 2   AM-PAC Basic Mobility Inpatient   Turning in Flat Bed Without Bedrails 3   Lying on Back to Sitting on Edge of Flat Bed Without Bedrails 3   Moving Bed to Chair 3   Standing Up From Chair Using Arms 3   Walk in Room 2   Climb 3-5 Stairs With Railing 1   Basic Mobility Inpatient Raw Score 15   Basic Mobility Standardized Score 36 97   Highest Level Of Mobility   JH-HLM Goal 4: Move to chair/commode   JH-HLM Achieved 4: Move to chair/commode   Education   Education Provided Mobility training;Assistive device  (Spinal precautions)   Patient Reinforcement needed   End of Consult   Patient Position at End of Consult Bedside chair; All needs within reach   End of Consult Comments RN present with patient     Agustina Page           Patient Name: Lisa Hernandez  GUERLINE Date: 4/15/2023

## 2023-04-15 NOTE — PLAN OF CARE
Problem: PHYSICAL THERAPY ADULT  Goal: Performs mobility at highest level of function for planned discharge setting  See evaluation for individualized goals  Description: Treatment/Interventions: Functional transfer training, LE strengthening/ROM, Elevations, Therapeutic exercise, Endurance training, Patient/family training, Equipment eval/education, Bed mobility, Gait training  Equipment Recommended: [place], Wheelchair       See flowsheet documentation for full assessment, interventions and recommendations  Outcome: Not Progressing  Note: Prognosis: Guarded  Problem List: Decreased strength, Decreased endurance, Impaired balance, Decreased mobility, Obesity, Pain  Assessment: Patient was received supine in bed with RN present  Patient requesting to use commode  High pain level at 10/10 despite being medicated ~30 min prior  Patient required education on spinal precautions, log roll technique, proper transfer technique with proper hand placement  Encouraged patient to ambulate in room, patient declined due to pain  He required min A for bed mobility, transfers and a short ambulation distance  Heavy reliance on RW  Patient is extremely limited by pain and is not at his functional baseline  Recommending level 2  The patient's AM-PAC Basic Mobility Inpatient Short Form Raw Score is 15  A Raw score of less than or equal to 17 suggests the patient may benefit from discharge to post-acute rehabilitation services  Please also refer to the recommendation of the Physical Therapist for safe discharge planning  Barriers to Discharge: Inaccessible home environment, Decreased caregiver support          See flowsheet documentation for full assessment

## 2023-04-15 NOTE — PLAN OF CARE
Problem: Prexisting or High Potential for Compromised Skin Integrity  Goal: Skin integrity is maintained or improved  Description: INTERVENTIONS:  - Identify patients at risk for skin breakdown  - Assess and monitor skin integrity  - Assess and monitor nutrition and hydration status  - Monitor labs   - Assess for incontinence   - Turn and reposition patient  - Assist with mobility/ambulation  - Relieve pressure over bony prominences  - Avoid friction and shearing  - Provide appropriate hygiene as needed including keeping skin clean and dry  - Evaluate need for skin moisturizer/barrier cream  - Collaborate with interdisciplinary team   - Patient/family teaching  - Consider wound care consult   Outcome: Progressing     Problem: MOBILITY - ADULT  Goal: Maintain or return to baseline ADL function  Description: INTERVENTIONS:  -  Assess patient's ability to carry out ADLs; assess patient's baseline for ADL function and identify physical deficits which impact ability to perform ADLs (bathing, care of mouth/teeth, toileting, grooming, dressing, etc )  - Assess/evaluate cause of self-care deficits   - Assess range of motion  - Assess patient's mobility; develop plan if impaired  - Assess patient's need for assistive devices and provide as appropriate  - Encourage maximum independence but intervene and supervise when necessary  - Involve family in performance of ADLs  - Assess for home care needs following discharge   - Consider OT consult to assist with ADL evaluation and planning for discharge  - Provide patient education as appropriate  Outcome: Progressing  Goal: Maintains/Returns to pre admission functional level  Description: INTERVENTIONS:  - Perform BMAT or MOVE assessment daily    - Set and communicate daily mobility goal to care team and patient/family/caregiver     - Collaborate with rehabilitation services on mobility goals if consulted  - Out of bed for toileting  - Record patient progress and toleration of activity level   Outcome: Progressing     Problem: PAIN - ADULT  Goal: Verbalizes/displays adequate comfort level or baseline comfort level  Description: Interventions:  - Encourage patient to monitor pain and request assistance  - Assess pain using appropriate pain scale  - Administer analgesics based on type and severity of pain and evaluate response  - Implement non-pharmacological measures as appropriate and evaluate response  - Consider cultural and social influences on pain and pain management  - Notify physician/advanced practitioner if interventions unsuccessful or patient reports new pain  Outcome: Progressing     Problem: INFECTION - ADULT  Goal: Absence or prevention of progression during hospitalization  Description: INTERVENTIONS:  - Assess and monitor for signs and symptoms of infection  - Monitor lab/diagnostic results  - Monitor all insertion sites, i e  indwelling lines, tubes, and drains  - Monitor endotracheal if appropriate and nasal secretions for changes in amount and color  - [place] appropriate cooling/warming therapies per order  - Administer medications as ordered  - Instruct and encourage patient and family to use good hand hygiene technique  - Identify and instruct in appropriate isolation precautions for identified infection/condition  Outcome: Progressing  Goal: Absence of fever/infection during neutropenic period  Description: INTERVENTIONS:  - Monitor WBC    Outcome: Progressing     Problem: SAFETY ADULT  Goal: Maintain or return to baseline ADL function  Description: INTERVENTIONS:  -  Assess patient's ability to carry out ADLs; assess patient's baseline for ADL function and identify physical deficits which impact ability to perform ADLs (bathing, care of mouth/teeth, toileting, grooming, dressing, etc )  - Assess/evaluate cause of self-care deficits   - Assess range of motion  - Assess patient's mobility; develop plan if impaired  - Assess patient's need for assistive devices and provide as appropriate  - Encourage maximum independence but intervene and supervise when necessary  - Involve family in performance of ADLs  - Assess for home care needs following discharge   - Consider OT consult to assist with ADL evaluation and planning for discharge  - Provide patient education as appropriate  Outcome: Progressing  Goal: Maintains/Returns to pre admission functional level  Description: INTERVENTIONS:  - Perform BMAT or MOVE assessment daily    - Set and communicate daily mobility goal to care team and patient/family/caregiver     - Collaborate with rehabilitation services on mobility goals if consulted  - Out of bed for toileting  - Record patient progress and toleration of activity level   Outcome: Progressing  Goal: Patient will remain free of falls  Description: INTERVENTIONS:  - Educate patient/family on patient safety including physical limitations  - Instruct patient to call for assistance with activity   - Consult OT/PT to assist with strengthening/mobility   - Keep Call bell within reach  - Keep bed low and locked with side rails adjusted as appropriate  - Keep care items and personal belongings within reach  - Initiate and maintain comfort rounds  - Make Fall Risk Sign visible to staff  - Apply yellow socks and bracelet for high fall risk patients  - Consider moving patient to room near nurses station  Outcome: Progressing     Problem: DISCHARGE PLANNING  Goal: Discharge to home or other facility with appropriate resources  Description: INTERVENTIONS:  - Identify barriers to discharge w/patient and caregiver  - Arrange for needed discharge resources and transportation as appropriate  - Identify discharge learning needs (meds, wound care, etc )  - Arrange for interpretive services to assist at discharge as needed  - Refer to Case Management Department for coordinating discharge planning if the patient needs post-hospital services based on physician/advanced practitioner order or complex needs related to functional status, cognitive ability, or social support system  Outcome: Progressing     Problem: Knowledge Deficit  Goal: Patient/family/caregiver demonstrates understanding of disease process, treatment plan, medications, and discharge instructions  Description: Complete learning assessment and assess knowledge base    Interventions:  - Provide teaching at level of understanding  - Provide teaching via preferred learning methods  Outcome: Progressing

## 2023-04-16 LAB
ANION GAP SERPL CALCULATED.3IONS-SCNC: 8 MMOL/L (ref 4–13)
BUN SERPL-MCNC: 11 MG/DL (ref 5–25)
CALCIUM SERPL-MCNC: 9.2 MG/DL (ref 8.4–10.2)
CHLORIDE SERPL-SCNC: 97 MMOL/L (ref 96–108)
CO2 SERPL-SCNC: 30 MMOL/L (ref 21–32)
CREAT SERPL-MCNC: 0.73 MG/DL (ref 0.6–1.3)
ERYTHROCYTE [DISTWIDTH] IN BLOOD BY AUTOMATED COUNT: 16.6 % (ref 11.6–15.1)
GFR SERPL CREATININE-BSD FRML MDRD: 97 ML/MIN/1.73SQ M
GLUCOSE SERPL-MCNC: 115 MG/DL (ref 65–140)
GLUCOSE SERPL-MCNC: 127 MG/DL (ref 65–140)
GLUCOSE SERPL-MCNC: 161 MG/DL (ref 65–140)
GLUCOSE SERPL-MCNC: 164 MG/DL (ref 65–140)
GLUCOSE SERPL-MCNC: 293 MG/DL (ref 65–140)
HCT VFR BLD AUTO: 28 % (ref 36.5–49.3)
HGB BLD-MCNC: 8.9 G/DL (ref 12–17)
MAGNESIUM SERPL-MCNC: 2 MG/DL (ref 1.9–2.7)
MCH RBC QN AUTO: 27.9 PG (ref 26.8–34.3)
MCHC RBC AUTO-ENTMCNC: 31.8 G/DL (ref 31.4–37.4)
MCV RBC AUTO: 88 FL (ref 82–98)
PLATELET # BLD AUTO: 363 THOUSANDS/UL (ref 149–390)
PMV BLD AUTO: 9.8 FL (ref 8.9–12.7)
POTASSIUM SERPL-SCNC: 3.9 MMOL/L (ref 3.5–5.3)
RBC # BLD AUTO: 3.19 MILLION/UL (ref 3.88–5.62)
SODIUM SERPL-SCNC: 135 MMOL/L (ref 135–147)
WBC # BLD AUTO: 10.14 THOUSAND/UL (ref 4.31–10.16)

## 2023-04-16 RX ORDER — LORATADINE 10 MG/1
5 TABLET ORAL DAILY
Status: DISCONTINUED | OUTPATIENT
Start: 2023-04-17 | End: 2023-04-16

## 2023-04-16 RX ORDER — FLUTICASONE PROPIONATE 50 MCG
1 SPRAY, SUSPENSION (ML) NASAL DAILY
Status: DISCONTINUED | OUTPATIENT
Start: 2023-04-16 | End: 2023-04-22 | Stop reason: HOSPADM

## 2023-04-16 RX ORDER — LORATADINE 10 MG/1
10 TABLET ORAL DAILY
Status: DISCONTINUED | OUTPATIENT
Start: 2023-04-17 | End: 2023-04-22 | Stop reason: HOSPADM

## 2023-04-16 RX ADMIN — METHOCARBAMOL 750 MG: 500 TABLET ORAL at 23:38

## 2023-04-16 RX ADMIN — OXYCODONE HYDROCHLORIDE 10 MG: 5 TABLET ORAL at 08:22

## 2023-04-16 RX ADMIN — DOCUSATE SODIUM 100 MG: 100 CAPSULE, LIQUID FILLED ORAL at 08:12

## 2023-04-16 RX ADMIN — TAMSULOSIN HYDROCHLORIDE 0.4 MG: 0.4 CAPSULE ORAL at 17:33

## 2023-04-16 RX ADMIN — INSULIN LISPRO 1 UNITS: 100 INJECTION, SOLUTION INTRAVENOUS; SUBCUTANEOUS at 08:15

## 2023-04-16 RX ADMIN — DOCUSATE SODIUM 100 MG: 100 CAPSULE, LIQUID FILLED ORAL at 17:33

## 2023-04-16 RX ADMIN — HYDROCHLOROTHIAZIDE 25 MG: 25 TABLET ORAL at 08:13

## 2023-04-16 RX ADMIN — LOSARTAN POTASSIUM 100 MG: 50 TABLET, FILM COATED ORAL at 08:12

## 2023-04-16 RX ADMIN — HYDROMORPHONE HYDROCHLORIDE 0.2 MG: 0.2 INJECTION, SOLUTION INTRAMUSCULAR; INTRAVENOUS; SUBCUTANEOUS at 00:33

## 2023-04-16 RX ADMIN — OXYCODONE HYDROCHLORIDE 10 MG: 5 TABLET ORAL at 18:32

## 2023-04-16 RX ADMIN — HEPARIN SODIUM 5000 UNITS: 5000 INJECTION INTRAVENOUS; SUBCUTANEOUS at 17:34

## 2023-04-16 RX ADMIN — FOLIC ACID 2000 MCG: 1 TABLET ORAL at 08:13

## 2023-04-16 RX ADMIN — ACETAMINOPHEN 325MG 975 MG: 325 TABLET ORAL at 00:32

## 2023-04-16 RX ADMIN — SENNOSIDES 8.6 MG: 8.6 TABLET, FILM COATED ORAL at 08:13

## 2023-04-16 RX ADMIN — SERTRALINE HYDROCHLORIDE 100 MG: 100 TABLET ORAL at 08:13

## 2023-04-16 RX ADMIN — METHOCARBAMOL 750 MG: 500 TABLET ORAL at 19:17

## 2023-04-16 RX ADMIN — LIDOCAINE 2 PATCH: 50 PATCH TOPICAL at 08:14

## 2023-04-16 RX ADMIN — HEPARIN SODIUM 5000 UNITS: 5000 INJECTION INTRAVENOUS; SUBCUTANEOUS at 08:14

## 2023-04-16 RX ADMIN — POLYETHYLENE GLYCOL 3350 17 G: 17 POWDER, FOR SOLUTION ORAL at 08:12

## 2023-04-16 RX ADMIN — NIFEDIPINE 120 MG: 30 TABLET, FILM COATED, EXTENDED RELEASE ORAL at 08:13

## 2023-04-16 RX ADMIN — ACETAMINOPHEN 325MG 975 MG: 325 TABLET ORAL at 23:38

## 2023-04-16 RX ADMIN — HEPARIN SODIUM 5000 UNITS: 5000 INJECTION INTRAVENOUS; SUBCUTANEOUS at 23:38

## 2023-04-16 RX ADMIN — METHOCARBAMOL 750 MG: 500 TABLET ORAL at 00:33

## 2023-04-16 RX ADMIN — INSULIN LISPRO 1 UNITS: 100 INJECTION, SOLUTION INTRAVENOUS; SUBCUTANEOUS at 17:40

## 2023-04-16 RX ADMIN — POLYETHYLENE GLYCOL 3350 17 G: 17 POWDER, FOR SOLUTION ORAL at 17:35

## 2023-04-16 RX ADMIN — FLUTICASONE PROPIONATE 1 SPRAY: 50 SPRAY, METERED NASAL at 09:18

## 2023-04-16 RX ADMIN — GABAPENTIN 300 MG: 300 CAPSULE ORAL at 17:33

## 2023-04-16 RX ADMIN — OXYCODONE HYDROCHLORIDE 5 MG: 5 TABLET ORAL at 04:43

## 2023-04-16 RX ADMIN — OXYCODONE HYDROCHLORIDE 10 MG: 5 TABLET ORAL at 23:37

## 2023-04-16 RX ADMIN — ACETAMINOPHEN 325MG 975 MG: 325 TABLET ORAL at 08:17

## 2023-04-16 RX ADMIN — CLONAZEPAM 1 MG: 1 TABLET ORAL at 08:13

## 2023-04-16 RX ADMIN — EMPAGLIFLOZIN 10 MG: 10 TABLET, FILM COATED ORAL at 08:14

## 2023-04-16 RX ADMIN — HEPARIN SODIUM 5000 UNITS: 5000 INJECTION INTRAVENOUS; SUBCUTANEOUS at 00:33

## 2023-04-16 RX ADMIN — HYDROMORPHONE HYDROCHLORIDE 0.2 MG: 0.2 INJECTION, SOLUTION INTRAMUSCULAR; INTRAVENOUS; SUBCUTANEOUS at 19:25

## 2023-04-16 RX ADMIN — METOPROLOL SUCCINATE 75 MG: 50 TABLET, EXTENDED RELEASE ORAL at 08:13

## 2023-04-16 RX ADMIN — GABAPENTIN 300 MG: 300 CAPSULE ORAL at 20:16

## 2023-04-16 RX ADMIN — METHOCARBAMOL 750 MG: 500 TABLET ORAL at 04:43

## 2023-04-16 RX ADMIN — INSULIN LISPRO 4 UNITS: 100 INJECTION, SOLUTION INTRAVENOUS; SUBCUTANEOUS at 12:04

## 2023-04-16 RX ADMIN — GABAPENTIN 300 MG: 300 CAPSULE ORAL at 08:14

## 2023-04-16 RX ADMIN — METHOCARBAMOL 750 MG: 500 TABLET ORAL at 11:14

## 2023-04-16 RX ADMIN — Medication 5000 UNITS: at 08:13

## 2023-04-16 RX ADMIN — MIRTAZAPINE 30 MG: 15 TABLET, FILM COATED ORAL at 22:24

## 2023-04-16 RX ADMIN — OXYCODONE HYDROCHLORIDE 5 MG: 5 TABLET ORAL at 11:14

## 2023-04-16 RX ADMIN — CLONAZEPAM 1 MG: 1 TABLET ORAL at 20:17

## 2023-04-16 RX ADMIN — ACETAMINOPHEN 325MG 975 MG: 325 TABLET ORAL at 17:33

## 2023-04-16 NOTE — PLAN OF CARE
Problem: Prexisting or High Potential for Compromised Skin Integrity  Goal: Skin integrity is maintained or improved  Description: INTERVENTIONS:  - Identify patients at risk for skin breakdown  - Assess and monitor skin integrity  - Assess and monitor nutrition and hydration status  - Monitor labs   - Assess for incontinence   - Turn and reposition patient  - Assist with mobility/ambulation  - Relieve pressure over bony prominences  - Avoid friction and shearing  - Provide appropriate hygiene as needed including keeping skin clean and dry  - Evaluate need for skin moisturizer/barrier cream  - Collaborate with interdisciplinary team   - Patient/family teaching  - Consider wound care consult   Outcome: Progressing     Problem: MOBILITY - ADULT  Goal: Maintain or return to baseline ADL function  Description: INTERVENTIONS:  -  Assess patient's ability to carry out ADLs; assess patient's baseline for ADL function and identify physical deficits which impact ability to perform ADLs (bathing, care of mouth/teeth, toileting, grooming, dressing, etc )  - Assess/evaluate cause of self-care deficits   - Assess range of motion  - Assess patient's mobility; develop plan if impaired  - Assess patient's need for assistive devices and provide as appropriate  - Encourage maximum independence but intervene and supervise when necessary  - Involve family in performance of ADLs  - Assess for home care needs following discharge   - Consider OT consult to assist with ADL evaluation and planning for discharge  - Provide patient education as appropriate  Outcome: Progressing  Goal: Maintains/Returns to pre admission functional level  Description: INTERVENTIONS:  - Perform BMAT or MOVE assessment daily    - Set and communicate daily mobility goal to care team and patient/family/caregiver  - Collaborate with rehabilitation services on mobility goals if consulted  - Perform Range of Motion  times a day    - Reposition patient every hours   - Dangle patient  times a day  - Stand patient  times a day  - Ambulate patient  times a day  - Out of bed to chair  times a day   - Out of bed for meals  times a day  - Out of bed for toileting  - Record patient progress and toleration of activity level   Outcome: Progressing     Problem: PAIN - ADULT  Goal: Verbalizes/displays adequate comfort level or baseline comfort level  Description: Interventions:  - Encourage patient to monitor pain and request assistance  - Assess pain using appropriate pain scale  - Administer analgesics based on type and severity of pain and evaluate response  - Implement non-pharmacological measures as appropriate and evaluate response  - Consider cultural and social influences on pain and pain management  - Notify physician/advanced practitioner if interventions unsuccessful or patient reports new pain  Outcome: Progressing     Problem: INFECTION - ADULT  Goal: Absence or prevention of progression during hospitalization  Description: INTERVENTIONS:  - Assess and monitor for signs and symptoms of infection  - Monitor lab/diagnostic results  - Monitor all insertion sites, i e  indwelling lines, tubes, and drains  - Monitor endotracheal if appropriate and nasal secretions for changes in amount and color  - [place] appropriate cooling/warming therapies per order  - Administer medications as ordered  - Instruct and encourage patient and family to use good hand hygiene technique  - Identify and instruct in appropriate isolation precautions for identified infection/condition  Outcome: Progressing  Goal: Absence of fever/infection during neutropenic period  Description: INTERVENTIONS:  - Monitor WBC    Outcome: Progressing     Problem: SAFETY ADULT  Goal: Maintain or return to baseline ADL function  Description: INTERVENTIONS:  -  Assess patient's ability to carry out ADLs; assess patient's baseline for ADL function and identify physical deficits which impact ability to perform ADLs (bathing, care of mouth/teeth, toileting, grooming, dressing, etc )  - Assess/evaluate cause of self-care deficits   - Assess range of motion  - Assess patient's mobility; develop plan if impaired  - Assess patient's need for assistive devices and provide as appropriate  - Encourage maximum independence but intervene and supervise when necessary  - Involve family in performance of ADLs  - Assess for home care needs following discharge   - Consider OT consult to assist with ADL evaluation and planning for discharge  - Provide patient education as appropriate  Outcome: Progressing  Goal: Maintains/Returns to pre admission functional level  Description: INTERVENTIONS:  - Perform BMAT or MOVE assessment daily    - Set and communicate daily mobility goal to care team and patient/family/caregiver  - Collaborate with rehabilitation services on mobility goals if consulted  - Perform Range of Motion times a day  - Reposition patient every hours    - Dangle patient  times a day  - Stand patient  times a day  - Ambulate patient  times a day  - Out of bed to chair  times a day   - Out of bed for meals  times a day  - Out of bed for toileting  - Record patient progress and toleration of activity level   Outcome: Progressing  Goal: Patient will remain free of falls  Description: INTERVENTIONS:  - Educate patient/family on patient safety including physical limitations  - Instruct patient to call for assistance with activity   - Consult OT/PT to assist with strengthening/mobility   - Keep Call bell within reach  - Keep bed low and locked with side rails adjusted as appropriate  - Keep care items and personal belongings within reach  - Initiate and maintain comfort rounds  - Make Fall Risk Sign visible to staff  - Offer Toileting every  Hours, in advance of need  - Initiate/Maintain alarm  - Obtain necessary fall risk management equipment:   - Apply yellow socks and bracelet for high fall risk patients  - Consider moving patient to room near nurses station  Outcome: Progressing     Problem: DISCHARGE PLANNING  Goal: Discharge to home or other facility with appropriate resources  Description: INTERVENTIONS:  - Identify barriers to discharge w/patient and caregiver  - Arrange for needed discharge resources and transportation as appropriate  - Identify discharge learning needs (meds, wound care, etc )  - Arrange for interpretive services to assist at discharge as needed  - Refer to Case Management Department for coordinating discharge planning if the patient needs post-hospital services based on physician/advanced practitioner order or complex needs related to functional status, cognitive ability, or social support system  Outcome: Progressing     Problem: Knowledge Deficit  Goal: Patient/family/caregiver demonstrates understanding of disease process, treatment plan, medications, and discharge instructions  Description: Complete learning assessment and assess knowledge base  Interventions:  - Provide teaching at level of understanding  - Provide teaching via preferred learning methods  Outcome: Progressing     Problem: Nutrition/Hydration-ADULT  Goal: Nutrient/Hydration intake appropriate for improving, restoring or maintaining nutritional needs  Description: Monitor and assess patient's nutrition/hydration status for malnutrition  Collaborate with interdisciplinary team and initiate plan and interventions as ordered  Monitor patient's weight and dietary intake as ordered or per policy  Utilize nutrition screening tool and intervene as necessary  Determine patient's food preferences and provide high-protein, high-caloric foods as appropriate       INTERVENTIONS:  - Monitor oral intake, urinary output, labs, and treatment plans  - Assess nutrition and hydration status and recommend course of action  - Evaluate amount of meals eaten  - Assist patient with eating if necessary   - Allow adequate time for meals  - Recommend/ encourage appropriate diets, oral nutritional supplements, and vitamin/mineral supplements  - Order, calculate, and assess calorie counts as needed  - Recommend, monitor, and adjust tube feedings and TPN/PPN based on assessed needs  - Assess need for intravenous fluids  - Provide specific nutrition/hydration education as appropriate  - Include patient/family/caregiver in decisions related to nutrition  Outcome: Progressing     Problem: CARDIOVASCULAR - ADULT  Goal: Maintains optimal cardiac output and hemodynamic stability  Description: INTERVENTIONS:  - Monitor I/O, vital signs and rhythm  - Monitor for S/S and trends of decreased cardiac output  - Administer and titrate ordered vasoactive medications to optimize hemodynamic stability  - Assess quality of pulses, skin color and temperature  - Assess for signs of decreased coronary artery perfusion  - Instruct patient to report change in severity of symptoms  Outcome: Progressing  Goal: Absence of cardiac dysrhythmias or at baseline rhythm  Description: INTERVENTIONS:  - Continuous cardiac monitoring, vital signs, obtain 12 lead EKG if ordered  - Administer antiarrhythmic and heart rate control medications as ordered  - Monitor electrolytes and administer replacement therapy as ordered  Outcome: Progressing     Problem: GENITOURINARY - ADULT  Goal: Urinary catheter remains patent  Description: INTERVENTIONS:  - Assess patency of urinary catheter  - If patient has a chronic ball, consider changing catheter if non-functioning  - Follow guidelines for intermittent irrigation of non-functioning urinary catheter  Outcome: Progressing     Problem: METABOLIC, FLUID AND ELECTROLYTES - ADULT  Goal: Electrolytes maintained within normal limits  Description: INTERVENTIONS:  - Monitor labs and assess patient for signs and symptoms of electrolyte imbalances  - Administer electrolyte replacement as ordered  - Monitor response to electrolyte replacements, including repeat lab results as appropriate  - Instruct patient on fluid and nutrition as appropriate  Outcome: Progressing  Goal: Fluid balance maintained  Description: INTERVENTIONS:  - Monitor labs   - Monitor I/O and WT  - Instruct patient on fluid and nutrition as appropriate  - Assess for signs & symptoms of volume excess or deficit  Outcome: Progressing  Goal: Glucose maintained within target range  Description: INTERVENTIONS:  - Monitor Blood Glucose as ordered  - Assess for signs and symptoms of hyperglycemia and hypoglycemia  - Administer ordered medications to maintain glucose within target range  - Assess nutritional intake and initiate nutrition service referral as needed  Outcome: Progressing     Problem: SKIN/TISSUE INTEGRITY - ADULT  Goal: Skin Integrity remains intact(Skin Breakdown Prevention)  Description: Assess:  -Perform [place] assessment every   -Clean and moisturize skin every   -Inspect skin when repositioning, toileting, and assisting with ADLS  -Assess under medical devices such as  every   -Assess extremities for adequate circulation and sensation     Bed Management:  -Have minimal linens on bed & keep smooth, unwrinkled  -Change linens as needed when moist or perspiring  -Avoid sitting or lying in one position for more than  hours while in bed  -Keep HOB at degrees     Toileting:  -Offer bedside commode  -Assess for incontinence every   -Use incontinent care products after each incontinent episode such as     Activity:  -Mobilize patient  times a day  -Encourage activity and walks on unit  -Encourage or provide ROM exercises   -Turn and reposition patient every  Hours  -Use appropriate equipment to lift or move patient in bed  -Instruct/ Assist with weight shifting every  when out of bed in chair  -Consider limitation of chair time  hour intervals    Skin Care:  -Avoid use of baby powder, tape, friction and shearing, hot water or constrictive clothing  -Relieve pressure over bony prominences using   -Do not massage red bony areas    Next Steps:  -Teach patient strategies to minimize risks such as    -Consider consults to  interdisciplinary teams such as   Outcome: Progressing  Goal: Incision(s), wounds(s) or drain site(s) healing without S/S of infection  Description: INTERVENTIONS  - Assess and document dressing, incision, wound bed, drain sites and surrounding tissue  - Provide patient and family education  - Perform skin care/dressing changes every   Outcome: Progressing     Problem: HEMATOLOGIC - ADULT  Goal: Maintains hematologic stability  Description: INTERVENTIONS  - Assess for signs and symptoms of bleeding or hemorrhage  - Monitor labs  - Administer supportive blood products/factors as ordered and appropriate  Outcome: Progressing     Problem: MUSCULOSKELETAL - ADULT  Goal: Maintain or return mobility to safest level of function  Description: INTERVENTIONS:  - Assess patient's ability to carry out ADLs; assess patient's baseline for ADL function and identify physical deficits which impact ability to perform ADLs (bathing, care of mouth/teeth, toileting, grooming, dressing, etc )  - Assess/evaluate cause of self-care deficits   - Assess range of motion  - Assess patient's mobility  - Assess patient's need for assistive devices and provide as appropriate  - Encourage maximum independence but intervene and supervise when necessary  - Involve family in performance of ADLs  - Assess for home care needs following discharge   - Consider OT consult to assist with ADL evaluation and planning for discharge  - Provide patient education as appropriate  Outcome: Progressing  Goal: Maintain proper alignment of affected body part  Description: INTERVENTIONS:  - Support, maintain and protect limb and body alignment  - Provide patient/ family with appropriate education  Outcome: Progressing     Problem: Potential for Falls  Goal: Patient will remain free of falls  Description: INTERVENTIONS:  - Educate patient/family on patient safety including physical limitations  - Instruct patient to call for assistance with activity   - Consult OT/PT to assist with strengthening/mobility   - Keep Call bell within reach  - Keep bed low and locked with side rails adjusted as appropriate  - Keep care items and personal belongings within reach  - Initiate and maintain comfort rounds  - Make Fall Risk Sign visible to staff  - Offer Toileting every  Hours, in advance of need  - Initiate/Maintain alarm  - Obtain necessary fall risk management equipment:   - Apply yellow socks and bracelet for high fall risk patients  - Consider moving patient to room near nurses station  Outcome: Progressing

## 2023-04-16 NOTE — PLAN OF CARE
Problem: Prexisting or High Potential for Compromised Skin Integrity  Goal: Skin integrity is maintained or improved  Description: INTERVENTIONS:  - Identify patients at risk for skin breakdown  - Assess and monitor skin integrity  - Assess and monitor nutrition and hydration status  - Monitor labs   - Assess for incontinence   - Turn and reposition patient  - Assist with mobility/ambulation  - Relieve pressure over bony prominences  - Avoid friction and shearing  - Provide appropriate hygiene as needed including keeping skin clean and dry  - Evaluate need for skin moisturizer/barrier cream  - Collaborate with interdisciplinary team   - Patient/family teaching  - Consider wound care consult   Outcome: Progressing     Problem: MOBILITY - ADULT  Goal: Maintain or return to baseline ADL function  Description: INTERVENTIONS:  -  Assess patient's ability to carry out ADLs; assess patient's baseline for ADL function and identify physical deficits which impact ability to perform ADLs (bathing, care of mouth/teeth, toileting, grooming, dressing, etc )  - Assess/evaluate cause of self-care deficits   - Assess range of motion  - Assess patient's mobility; develop plan if impaired  - Assess patient's need for assistive devices and provide as appropriate  - Encourage maximum independence but intervene and supervise when necessary  - Involve family in performance of ADLs  - Assess for home care needs following discharge   - Consider OT consult to assist with ADL evaluation and planning for discharge  - Provide patient education as appropriate  Outcome: Progressing  Goal: Maintains/Returns to pre admission functional level  Description: INTERVENTIONS:  - Perform BMAT or MOVE assessment daily    - Set and communicate daily mobility goal to care team and patient/family/caregiver  - Collaborate with rehabilitation services on mobility goals if consulted  - Perform Range of Motion x times a day    - Reposition patient every x hours   - Dangle patient x times a day  - Stand patient x times a day  - Ambulate patient x times a day  - Out of bed to chair x times a day   - Out of bed for meals x times a day  - Out of bed for toileting  - Record patient progress and toleration of activity level   Outcome: Progressing     Problem: PAIN - ADULT  Goal: Verbalizes/displays adequate comfort level or baseline comfort level  Description: Interventions:  - Encourage patient to monitor pain and request assistance  - Assess pain using appropriate pain scale  - Administer analgesics based on type and severity of pain and evaluate response  - Implement non-pharmacological measures as appropriate and evaluate response  - Consider cultural and social influences on pain and pain management  - Notify physician/advanced practitioner if interventions unsuccessful or patient reports new pain  Outcome: Progressing     Problem: INFECTION - ADULT  Goal: Absence or prevention of progression during hospitalization  Description: INTERVENTIONS:  - Assess and monitor for signs and symptoms of infection  - Monitor lab/diagnostic results  - Monitor all insertion sites, i e  indwelling lines, tubes, and drains  - Monitor endotracheal if appropriate and nasal secretions for changes in amount and color  - [place] appropriate cooling/warming therapies per order  - Administer medications as ordered  - Instruct and encourage patient and family to use good hand hygiene technique  - Identify and instruct in appropriate isolation precautions for identified infection/condition  Outcome: Progressing  Goal: Absence of fever/infection during neutropenic period  Description: INTERVENTIONS:  - Monitor WBC    Outcome: Progressing     Problem: SAFETY ADULT  Goal: Maintain or return to baseline ADL function  Description: INTERVENTIONS:  -  Assess patient's ability to carry out ADLs; assess patient's baseline for ADL function and identify physical deficits which impact ability to perform ADLs (bathing, care of mouth/teeth, toileting, grooming, dressing, etc )  - Assess/evaluate cause of self-care deficits   - Assess range of motion  - Assess patient's mobility; develop plan if impaired  - Assess patient's need for assistive devices and provide as appropriate  - Encourage maximum independence but intervene and supervise when necessary  - Involve family in performance of ADLs  - Assess for home care needs following discharge   - Consider OT consult to assist with ADL evaluation and planning for discharge  - Provide patient education as appropriate  Outcome: Progressing  Goal: Maintains/Returns to pre admission functional level  Description: INTERVENTIONS:  - Perform BMAT or MOVE assessment daily    - Set and communicate daily mobility goal to care team and patient/family/caregiver  - Collaborate with rehabilitation services on mobility goals if consulted  - Perform Range of Motion x times a day  - Reposition patient every x hours    - Dangle patient x times a day  - Stand patient x times a day  - Ambulate patient x times a day  - Out of bed to chair x times a day   - Out of bed for meals x times a day  - Out of bed for toileting  - Record patient progress and toleration of activity level   Outcome: Progressing  Goal: Patient will remain free of falls  Description: INTERVENTIONS:  - Educate patient/family on patient safety including physical limitations  - Instruct patient to call for assistance with activity   - Consult OT/PT to assist with strengthening/mobility   - Keep Call bell within reach  - Keep bed low and locked with side rails adjusted as appropriate  - Keep care items and personal belongings within reach  - Initiate and maintain comfort rounds  - Make Fall Risk Sign visible to staff  - Offer Toileting every x Hours, in advance of need  - Initiate/Maintain xalarm  - Obtain necessary fall risk management equipment: x  - Apply yellow socks and bracelet for high fall risk patients  - Consider moving patient to room near nurses station  Outcome: Progressing     Problem: DISCHARGE PLANNING  Goal: Discharge to home or other facility with appropriate resources  Description: INTERVENTIONS:  - Identify barriers to discharge w/patient and caregiver  - Arrange for needed discharge resources and transportation as appropriate  - Identify discharge learning needs (meds, wound care, etc )  - Arrange for interpretive services to assist at discharge as needed  - Refer to Case Management Department for coordinating discharge planning if the patient needs post-hospital services based on physician/advanced practitioner order or complex needs related to functional status, cognitive ability, or social support system  Outcome: Progressing     Problem: Knowledge Deficit  Goal: Patient/family/caregiver demonstrates understanding of disease process, treatment plan, medications, and discharge instructions  Description: Complete learning assessment and assess knowledge base  Interventions:  - Provide teaching at level of understanding  - Provide teaching via preferred learning methods  Outcome: Progressing     Problem: Nutrition/Hydration-ADULT  Goal: Nutrient/Hydration intake appropriate for improving, restoring or maintaining nutritional needs  Description: Monitor and assess patient's nutrition/hydration status for malnutrition  Collaborate with interdisciplinary team and initiate plan and interventions as ordered  Monitor patient's weight and dietary intake as ordered or per policy  Utilize nutrition screening tool and intervene as necessary  Determine patient's food preferences and provide high-protein, high-caloric foods as appropriate       INTERVENTIONS:  - Monitor oral intake, urinary output, labs, and treatment plans  - Assess nutrition and hydration status and recommend course of action  - Evaluate amount of meals eaten  - Assist patient with eating if necessary   - Allow adequate time for meals  - Recommend/ encourage appropriate diets, oral nutritional supplements, and vitamin/mineral supplements  - Order, calculate, and assess calorie counts as needed  - Recommend, monitor, and adjust tube feedings and TPN/PPN based on assessed needs  - Assess need for intravenous fluids  - Provide specific nutrition/hydration education as appropriate  - Include patient/family/caregiver in decisions related to nutrition  Outcome: Progressing     Problem: CARDIOVASCULAR - ADULT  Goal: Maintains optimal cardiac output and hemodynamic stability  Description: INTERVENTIONS:  - Monitor I/O, vital signs and rhythm  - Monitor for S/S and trends of decreased cardiac output  - Administer and titrate ordered vasoactive medications to optimize hemodynamic stability  - Assess quality of pulses, skin color and temperature  - Assess for signs of decreased coronary artery perfusion  - Instruct patient to report change in severity of symptoms  Outcome: Progressing  Goal: Absence of cardiac dysrhythmias or at baseline rhythm  Description: INTERVENTIONS:  - Continuous cardiac monitoring, vital signs, obtain 12 lead EKG if ordered  - Administer antiarrhythmic and heart rate control medications as ordered  - Monitor electrolytes and administer replacement therapy as ordered  Outcome: Progressing     Problem: GENITOURINARY - ADULT  Goal: Urinary catheter remains patent  Description: INTERVENTIONS:  - Assess patency of urinary catheter  - If patient has a chronic ball, consider changing catheter if non-functioning  - Follow guidelines for intermittent irrigation of non-functioning urinary catheter  Outcome: Progressing     Problem: METABOLIC, FLUID AND ELECTROLYTES - ADULT  Goal: Electrolytes maintained within normal limits  Description: INTERVENTIONS:  - Monitor labs and assess patient for signs and symptoms of electrolyte imbalances  - Administer electrolyte replacement as ordered  - Monitor response to electrolyte replacements, including repeat lab results as appropriate  - Instruct patient on fluid and nutrition as appropriate  Outcome: Progressing  Goal: Fluid balance maintained  Description: INTERVENTIONS:  - Monitor labs   - Monitor I/O and WT  - Instruct patient on fluid and nutrition as appropriate  - Assess for signs & symptoms of volume excess or deficit  Outcome: Progressing  Goal: Glucose maintained within target range  Description: INTERVENTIONS:  - Monitor Blood Glucose as ordered  - Assess for signs and symptoms of hyperglycemia and hypoglycemia  - Administer ordered medications to maintain glucose within target range  - Assess nutritional intake and initiate nutrition service referral as needed  Outcome: Progressing     Problem: SKIN/TISSUE INTEGRITY - ADULT  Goal: Skin Integrity remains intact(Skin Breakdown Prevention)  Description: Assess:  -Perform Sanjay assessment every x  -Clean and moisturize skin every x  -Inspect skin when repositioning, toileting, and assisting with ADLS  -Assess under medical devices such as x every x  -Assess extremities for adequate circulation and sensation     Bed Management:  -Have minimal linens on bed & keep smooth, unwrinkled  -Change linens as needed when moist or perspiring  -Avoid sitting or lying in one position for more than x hours while in bed  -[facility] at [facility]     Toileting:  -Offer bedside commode  -Assess for incontinence every x  -Use incontinent care products after each incontinent episode such as x    Activity:  -Mobilize patient x times a day  -Encourage activity and walks on unit  -Encourage or provide ROM exercises   -Turn and reposition patient every x Hours  -Use appropriate equipment to lift or move patient in bed  -Instruct/ Assist with weight shifting every x when out of bed in chair  -Consider limitation of chair time x hour intervals    Skin Care:  -Avoid use of baby powder, tape, friction and shearing, hot water or constrictive clothing  -Relieve pressure over bony prominences using x  -Do not massage red bony areas    Next Steps:  -Teach patient strategies to minimize risks such as x   -Consider consults to  interdisciplinary teams such as x  Outcome: Progressing  Goal: Incision(s), wounds(s) or drain site(s) healing without S/S of infection  Description: INTERVENTIONS  - Assess and document dressing, incision, wound bed, drain sites and surrounding tissue  - Provide patient and family education  - Perform skin care/dressing changes every x  Outcome: Progressing     Problem: HEMATOLOGIC - ADULT  Goal: Maintains hematologic stability  Description: INTERVENTIONS  - Assess for signs and symptoms of bleeding or hemorrhage  - Monitor labs  - Administer supportive blood products/factors as ordered and appropriate  Outcome: Progressing     Problem: MUSCULOSKELETAL - ADULT  Goal: Maintain or return mobility to safest level of function  Description: INTERVENTIONS:  - Assess patient's ability to carry out ADLs; assess patient's baseline for ADL function and identify physical deficits which impact ability to perform ADLs (bathing, care of mouth/teeth, toileting, grooming, dressing, etc )  - Assess/evaluate cause of self-care deficits   - Assess range of motion  - Assess patient's mobility  - Assess patient's need for assistive devices and provide as appropriate  - Encourage maximum independence but intervene and supervise when necessary  - Involve family in performance of ADLs  - Assess for home care needs following discharge   - Consider OT consult to assist with ADL evaluation and planning for discharge  - Provide patient education as appropriate  Outcome: Progressing  Goal: Maintain proper alignment of affected body part  Description: INTERVENTIONS:  - Support, maintain and protect limb and body alignment  - Provide patient/ family with appropriate education  Outcome: Progressing     Problem: Potential for Falls  Goal: Patient will remain free of falls  Description: INTERVENTIONS:  - Educate patient/family on patient safety including physical limitations  - Instruct patient to call for assistance with activity   - Consult OT/PT to assist with strengthening/mobility   - Keep Call bell within reach  - Keep bed low and locked with side rails adjusted as appropriate  - Keep care items and personal belongings within reach  - Initiate and maintain comfort rounds  - Make Fall Risk Sign visible to staff  - Offer Toileting every x Hours, in advance of need  - Initiate/Maintain xalarm  - Obtain necessary fall risk management equipment: x  - Apply yellow socks and bracelet for high fall risk patients  - Consider moving patient to room near nurses station  Outcome: Progressing

## 2023-04-16 NOTE — PLAN OF CARE
Problem: Prexisting or High Potential for Compromised Skin Integrity  Goal: Skin integrity is maintained or improved  Description: INTERVENTIONS:  - Identify patients at risk for skin breakdown  - Assess and monitor skin integrity  - Assess and monitor nutrition and hydration status  - Monitor labs   - Assess for incontinence   - Turn and reposition patient  - Assist with mobility/ambulation  - Relieve pressure over bony prominences  - Avoid friction and shearing  - Provide appropriate hygiene as needed including keeping skin clean and dry  - Evaluate need for skin moisturizer/barrier cream  - Collaborate with interdisciplinary team   - Patient/family teaching  - Consider wound care consult   Outcome: Progressing     Problem: MOBILITY - ADULT  Goal: Maintain or return to baseline ADL function  Description: INTERVENTIONS:  -  Assess patient's ability to carry out ADLs; assess patient's baseline for ADL function and identify physical deficits which impact ability to perform ADLs (bathing, care of mouth/teeth, toileting, grooming, dressing, etc )  - Assess/evaluate cause of self-care deficits   - Assess range of motion  - Assess patient's mobility; develop plan if impaired  - Assess patient's need for assistive devices and provide as appropriate  - Encourage maximum independence but intervene and supervise when necessary  - Involve family in performance of ADLs  - Assess for home care needs following discharge   - Consider OT consult to assist with ADL evaluation and planning for discharge  - Provide patient education as appropriate  Outcome: Progressing  Goal: Maintains/Returns to pre admission functional level  Description: INTERVENTIONS:  - Perform BMAT or MOVE assessment daily    - Set and communicate daily mobility goal to care team and patient/family/caregiver  - Collaborate with rehabilitation services on mobility goals if consulted  - Perform Range of Motion  times a day    - Reposition patient every hours   - Dangle patient  times a day  - Stand patient  times a day  - Ambulate patient  times a day  - Out of bed to chair  times a day   - Out of bed for meals  times a day  - Out of bed for toileting  - Record patient progress and toleration of activity level   Outcome: Progressing     Problem: PAIN - ADULT  Goal: Verbalizes/displays adequate comfort level or baseline comfort level  Description: Interventions:  - Encourage patient to monitor pain and request assistance  - Assess pain using appropriate pain scale  - Administer analgesics based on type and severity of pain and evaluate response  - Implement non-pharmacological measures as appropriate and evaluate response  - Consider cultural and social influences on pain and pain management  - Notify physician/advanced practitioner if interventions unsuccessful or patient reports new pain  Outcome: Progressing     Problem: INFECTION - ADULT  Goal: Absence or prevention of progression during hospitalization  Description: INTERVENTIONS:  - Assess and monitor for signs and symptoms of infection  - Monitor lab/diagnostic results  - Monitor all insertion sites, i e  indwelling lines, tubes, and drains  - Monitor endotracheal if appropriate and nasal secretions for changes in amount and color  - [place] appropriate cooling/warming therapies per order  - Administer medications as ordered  - Instruct and encourage patient and family to use good hand hygiene technique  - Identify and instruct in appropriate isolation precautions for identified infection/condition  Outcome: Progressing  Goal: Absence of fever/infection during neutropenic period  Description: INTERVENTIONS:  - Monitor WBC    Outcome: Progressing     Problem: SAFETY ADULT  Goal: Maintain or return to baseline ADL function  Description: INTERVENTIONS:  -  Assess patient's ability to carry out ADLs; assess patient's baseline for ADL function and identify physical deficits which impact ability to perform ADLs (bathing, care of mouth/teeth, toileting, grooming, dressing, etc )  - Assess/evaluate cause of self-care deficits   - Assess range of motion  - Assess patient's mobility; develop plan if impaired  - Assess patient's need for assistive devices and provide as appropriate  - Encourage maximum independence but intervene and supervise when necessary  - Involve family in performance of ADLs  - Assess for home care needs following discharge   - Consider OT consult to assist with ADL evaluation and planning for discharge  - Provide patient education as appropriate  Outcome: Progressing  Goal: Maintains/Returns to pre admission functional level  Description: INTERVENTIONS:  - Perform BMAT or MOVE assessment daily    - Set and communicate daily mobility goal to care team and patient/family/caregiver  - Collaborate with rehabilitation services on mobility goals if consulted  - Perform Range of Motion  times a day  - Reposition patient every  hours    - Dangle patient  times a day  - Stand patient  times a day  - Ambulate patient  times a day  - Out of bed to chair  times a day   - Out of bed for meals  times a day  - Out of bed for toileting  - Record patient progress and toleration of activity level   Outcome: Progressing  Goal: Patient will remain free of falls  Description: INTERVENTIONS:  - Educate patient/family on patient safety including physical limitations  - Instruct patient to call for assistance with activity   - Consult OT/PT to assist with strengthening/mobility   - Keep Call bell within reach  - Keep bed low and locked with side rails adjusted as appropriate  - Keep care items and personal belongings within reach  - Initiate and maintain comfort rounds  - Make Fall Risk Sign visible to staff  - Offer Toileting every  Hours, in advance of need  - Initiate/Maintain alarm  - Obtain necessary fall risk management equipment:  - Apply yellow socks and bracelet for high fall risk patients  - Consider moving patient to room near nurses station  Outcome: Progressing     Problem: DISCHARGE PLANNING  Goal: Discharge to home or other facility with appropriate resources  Description: INTERVENTIONS:  - Identify barriers to discharge w/patient and caregiver  - Arrange for needed discharge resources and transportation as appropriate  - Identify discharge learning needs (meds, wound care, etc )  - Arrange for interpretive services to assist at discharge as needed  - Refer to Case Management Department for coordinating discharge planning if the patient needs post-hospital services based on physician/advanced practitioner order or complex needs related to functional status, cognitive ability, or social support system  Outcome: Progressing     Problem: Knowledge Deficit  Goal: Patient/family/caregiver demonstrates understanding of disease process, treatment plan, medications, and discharge instructions  Description: Complete learning assessment and assess knowledge base    Interventions:  - Provide teaching at level of understanding  - Provide teaching via preferred learning methods  Outcome: Progressing

## 2023-04-16 NOTE — QUICK NOTE
Patient is off ketamine gtt and no longer requires critical care monitoring  Pulm/Critical Care will sign off    Please call with questions

## 2023-04-17 ENCOUNTER — APPOINTMENT (INPATIENT)
Dept: CT IMAGING | Facility: HOSPITAL | Age: 68
End: 2023-04-17

## 2023-04-17 LAB
ANION GAP SERPL CALCULATED.3IONS-SCNC: 13 MMOL/L (ref 4–13)
BUN SERPL-MCNC: 13 MG/DL (ref 5–25)
CALCIUM SERPL-MCNC: 9.4 MG/DL (ref 8.4–10.2)
CHLORIDE SERPL-SCNC: 94 MMOL/L (ref 96–108)
CO2 SERPL-SCNC: 25 MMOL/L (ref 21–32)
CREAT SERPL-MCNC: 0.88 MG/DL (ref 0.6–1.3)
ERYTHROCYTE [DISTWIDTH] IN BLOOD BY AUTOMATED COUNT: 16.5 % (ref 11.6–15.1)
GFR SERPL CREATININE-BSD FRML MDRD: 90 ML/MIN/1.73SQ M
GLUCOSE SERPL-MCNC: 140 MG/DL (ref 65–140)
GLUCOSE SERPL-MCNC: 144 MG/DL (ref 65–140)
GLUCOSE SERPL-MCNC: 169 MG/DL (ref 65–140)
GLUCOSE SERPL-MCNC: 195 MG/DL (ref 65–140)
GLUCOSE SERPL-MCNC: 224 MG/DL (ref 65–140)
HCT VFR BLD AUTO: 28.3 % (ref 36.5–49.3)
HGB BLD-MCNC: 8.8 G/DL (ref 12–17)
MCH RBC QN AUTO: 27.2 PG (ref 26.8–34.3)
MCHC RBC AUTO-ENTMCNC: 31.1 G/DL (ref 31.4–37.4)
MCV RBC AUTO: 87 FL (ref 82–98)
PLATELET # BLD AUTO: 413 THOUSANDS/UL (ref 149–390)
PMV BLD AUTO: 10.1 FL (ref 8.9–12.7)
POTASSIUM SERPL-SCNC: 3.8 MMOL/L (ref 3.5–5.3)
RBC # BLD AUTO: 3.24 MILLION/UL (ref 3.88–5.62)
SODIUM SERPL-SCNC: 132 MMOL/L (ref 135–147)
WBC # BLD AUTO: 11.44 THOUSAND/UL (ref 4.31–10.16)

## 2023-04-17 RX ORDER — HYDROMORPHONE HYDROCHLORIDE 2 MG/1
2 TABLET ORAL
Status: DISCONTINUED | OUTPATIENT
Start: 2023-04-17 | End: 2023-04-18

## 2023-04-17 RX ORDER — HYDROMORPHONE HYDROCHLORIDE 2 MG/1
4 TABLET ORAL
Status: DISCONTINUED | OUTPATIENT
Start: 2023-04-17 | End: 2023-04-18

## 2023-04-17 RX ADMIN — OXYCODONE HYDROCHLORIDE 10 MG: 5 TABLET ORAL at 05:49

## 2023-04-17 RX ADMIN — METHOCARBAMOL 750 MG: 500 TABLET ORAL at 12:08

## 2023-04-17 RX ADMIN — FLUTICASONE PROPIONATE 1 SPRAY: 50 SPRAY, METERED NASAL at 07:53

## 2023-04-17 RX ADMIN — METHOCARBAMOL 750 MG: 500 TABLET ORAL at 05:49

## 2023-04-17 RX ADMIN — MIRTAZAPINE 30 MG: 15 TABLET, FILM COATED ORAL at 20:09

## 2023-04-17 RX ADMIN — HYDROCHLOROTHIAZIDE 25 MG: 25 TABLET ORAL at 07:47

## 2023-04-17 RX ADMIN — POLYETHYLENE GLYCOL 3350 17 G: 17 POWDER, FOR SOLUTION ORAL at 07:43

## 2023-04-17 RX ADMIN — SENNOSIDES 8.6 MG: 8.6 TABLET, FILM COATED ORAL at 07:47

## 2023-04-17 RX ADMIN — HYDROMORPHONE HYDROCHLORIDE 0.2 MG: 0.2 INJECTION, SOLUTION INTRAMUSCULAR; INTRAVENOUS; SUBCUTANEOUS at 07:43

## 2023-04-17 RX ADMIN — OXYCODONE HYDROCHLORIDE 10 MG: 5 TABLET ORAL at 09:07

## 2023-04-17 RX ADMIN — METHOCARBAMOL 750 MG: 500 TABLET ORAL at 17:02

## 2023-04-17 RX ADMIN — HEPARIN SODIUM 5000 UNITS: 5000 INJECTION INTRAVENOUS; SUBCUTANEOUS at 17:02

## 2023-04-17 RX ADMIN — HYDROMORPHONE HYDROCHLORIDE 2 MG: 2 TABLET ORAL at 12:07

## 2023-04-17 RX ADMIN — NIFEDIPINE 120 MG: 30 TABLET, FILM COATED, EXTENDED RELEASE ORAL at 07:45

## 2023-04-17 RX ADMIN — HYDROMORPHONE HYDROCHLORIDE 4 MG: 2 TABLET ORAL at 15:19

## 2023-04-17 RX ADMIN — CLONAZEPAM 1 MG: 1 TABLET ORAL at 07:46

## 2023-04-17 RX ADMIN — LIDOCAINE 2 PATCH: 50 PATCH TOPICAL at 07:54

## 2023-04-17 RX ADMIN — HYDROXYZINE HYDROCHLORIDE 50 MG: 25 TABLET, FILM COATED ORAL at 03:35

## 2023-04-17 RX ADMIN — INSULIN LISPRO 2 UNITS: 100 INJECTION, SOLUTION INTRAVENOUS; SUBCUTANEOUS at 22:09

## 2023-04-17 RX ADMIN — HYDROMORPHONE HYDROCHLORIDE 0.2 MG: 0.2 INJECTION, SOLUTION INTRAMUSCULAR; INTRAVENOUS; SUBCUTANEOUS at 03:34

## 2023-04-17 RX ADMIN — HYDROXYZINE HYDROCHLORIDE 50 MG: 25 TABLET, FILM COATED ORAL at 12:08

## 2023-04-17 RX ADMIN — HYDROXYZINE HYDROCHLORIDE 50 MG: 25 TABLET, FILM COATED ORAL at 20:10

## 2023-04-17 RX ADMIN — FOLIC ACID 2000 MCG: 1 TABLET ORAL at 07:46

## 2023-04-17 RX ADMIN — CLONAZEPAM 1 MG: 1 TABLET ORAL at 17:02

## 2023-04-17 RX ADMIN — HEPARIN SODIUM 5000 UNITS: 5000 INJECTION INTRAVENOUS; SUBCUTANEOUS at 07:53

## 2023-04-17 RX ADMIN — GABAPENTIN 300 MG: 300 CAPSULE ORAL at 15:19

## 2023-04-17 RX ADMIN — IOHEXOL 100 ML: 350 INJECTION, SOLUTION INTRAVENOUS at 12:30

## 2023-04-17 RX ADMIN — LOSARTAN POTASSIUM 100 MG: 50 TABLET, FILM COATED ORAL at 07:47

## 2023-04-17 RX ADMIN — INSULIN LISPRO 1 UNITS: 100 INJECTION, SOLUTION INTRAVENOUS; SUBCUTANEOUS at 07:56

## 2023-04-17 RX ADMIN — LORATADINE 10 MG: 10 TABLET ORAL at 07:48

## 2023-04-17 RX ADMIN — DOCUSATE SODIUM 100 MG: 100 CAPSULE, LIQUID FILLED ORAL at 07:49

## 2023-04-17 RX ADMIN — INSULIN LISPRO 2 UNITS: 100 INJECTION, SOLUTION INTRAVENOUS; SUBCUTANEOUS at 12:05

## 2023-04-17 RX ADMIN — ACETAMINOPHEN 325MG 975 MG: 325 TABLET ORAL at 17:02

## 2023-04-17 RX ADMIN — EMPAGLIFLOZIN 10 MG: 10 TABLET, FILM COATED ORAL at 07:49

## 2023-04-17 RX ADMIN — ACETAMINOPHEN 325MG 975 MG: 325 TABLET ORAL at 09:07

## 2023-04-17 RX ADMIN — SERTRALINE HYDROCHLORIDE 100 MG: 100 TABLET ORAL at 07:46

## 2023-04-17 RX ADMIN — HYDROMORPHONE HYDROCHLORIDE 4 MG: 2 TABLET ORAL at 20:08

## 2023-04-17 RX ADMIN — METOPROLOL SUCCINATE 75 MG: 50 TABLET, EXTENDED RELEASE ORAL at 07:49

## 2023-04-17 RX ADMIN — TAMSULOSIN HYDROCHLORIDE 0.4 MG: 0.4 CAPSULE ORAL at 15:19

## 2023-04-17 RX ADMIN — Medication 5000 UNITS: at 07:45

## 2023-04-17 RX ADMIN — GABAPENTIN 300 MG: 300 CAPSULE ORAL at 07:48

## 2023-04-17 RX ADMIN — GABAPENTIN 300 MG: 300 CAPSULE ORAL at 20:08

## 2023-04-17 NOTE — PLAN OF CARE
Problem: Prexisting or High Potential for Compromised Skin Integrity  Goal: Skin integrity is maintained or improved  Description: INTERVENTIONS:  - Identify patients at risk for skin breakdown  - Assess and monitor skin integrity  - Assess and monitor nutrition and hydration status  - Monitor labs   - Assess for incontinence   - Turn and reposition patient  - Assist with mobility/ambulation  - Relieve pressure over bony prominences  - Avoid friction and shearing  - Provide appropriate hygiene as needed including keeping skin clean and dry  - Evaluate need for skin moisturizer/barrier cream  - Collaborate with interdisciplinary team   - Patient/family teaching  - Consider wound care consult   Outcome: Progressing     Problem: MOBILITY - ADULT  Goal: Maintain or return to baseline ADL function  Description: INTERVENTIONS:  -  Assess patient's ability to carry out ADLs; assess patient's baseline for ADL function and identify physical deficits which impact ability to perform ADLs (bathing, care of mouth/teeth, toileting, grooming, dressing, etc )  - Assess/evaluate cause of self-care deficits   - Assess range of motion  - Assess patient's mobility; develop plan if impaired  - Assess patient's need for assistive devices and provide as appropriate  - Encourage maximum independence but intervene and supervise when necessary  - Involve family in performance of ADLs  - Assess for home care needs following discharge   - Consider OT consult to assist with ADL evaluation and planning for discharge  - Provide patient education as appropriate  Outcome: Progressing  Goal: Maintains/Returns to pre admission functional level  Description: INTERVENTIONS:  - Perform BMAT or MOVE assessment daily    - Set and communicate daily mobility goal to care team and patient/family/caregiver  - Collaborate with rehabilitation services on mobility goals if consulted  - Perform Range of Motion x times a day    - Reposition patient every x hours   - Dangle patient x times a day  - Stand patient x times a day  - Ambulate patient x times a day  - Out of bed to chair x times a day   - Out of bed for meals x times a day  - Out of bed for toileting  - Record patient progress and toleration of activity level   Outcome: Progressing     Problem: PAIN - ADULT  Goal: Verbalizes/displays adequate comfort level or baseline comfort level  Description: Interventions:  - Encourage patient to monitor pain and request assistance  - Assess pain using appropriate pain scale  - Administer analgesics based on type and severity of pain and evaluate response  - Implement non-pharmacological measures as appropriate and evaluate response  - Consider cultural and social influences on pain and pain management  - Notify physician/advanced practitioner if interventions unsuccessful or patient reports new pain  Outcome: Progressing     Problem: INFECTION - ADULT  Goal: Absence or prevention of progression during hospitalization  Description: INTERVENTIONS:  - Assess and monitor for signs and symptoms of infection  - Monitor lab/diagnostic results  - Monitor all insertion sites, i e  indwelling lines, tubes, and drains  - Monitor endotracheal if appropriate and nasal secretions for changes in amount and color  - Meherrin appropriate cooling/warming therapies per order  - Administer medications as ordered  - Instruct and encourage patient and family to use good hand hygiene technique  - Identify and instruct in appropriate isolation precautions for identified infection/condition  Outcome: Progressing  Goal: Absence of fever/infection during neutropenic period  Description: INTERVENTIONS:  - Monitor WBC    Outcome: Progressing     Problem: SAFETY ADULT  Goal: Maintain or return to baseline ADL function  Description: INTERVENTIONS:  -  Assess patient's ability to carry out ADLs; assess patient's baseline for ADL function and identify physical deficits which impact ability to perform ADLs (bathing, care of mouth/teeth, toileting, grooming, dressing, etc )  - Assess/evaluate cause of self-care deficits   - Assess range of motion  - Assess patient's mobility; develop plan if impaired  - Assess patient's need for assistive devices and provide as appropriate  - Encourage maximum independence but intervene and supervise when necessary  - Involve family in performance of ADLs  - Assess for home care needs following discharge   - Consider OT consult to assist with ADL evaluation and planning for discharge  - Provide patient education as appropriate  Outcome: Progressing  Goal: Maintains/Returns to pre admission functional level  Description: INTERVENTIONS:  - Perform BMAT or MOVE assessment daily    - Set and communicate daily mobility goal to care team and patient/family/caregiver  - Collaborate with rehabilitation services on mobility goals if consulted  - Perform Range of Motion x times a day  - Reposition patient every x hours    - Dangle patient x times a day  - Stand patient x times a day  - Ambulate patient x times a day  - Out of bed to chair x times a day   - Out of bed for meals x times a day  - Out of bed for toileting  - Record patient progress and toleration of activity level   Outcome: Progressing  Goal: Patient will remain free of falls  Description: INTERVENTIONS:  - Educate patient/family on patient safety including physical limitations  - Instruct patient to call for assistance with activity   - Consult OT/PT to assist with strengthening/mobility   - Keep Call bell within reach  - Keep bed low and locked with side rails adjusted as appropriate  - Keep care items and personal belongings within reach  - Initiate and maintain comfort rounds  - Make Fall Risk Sign visible to staff  - Offer Toileting every x Hours, in advance of need  - Initiate/Maintain xalarm  - Obtain necessary fall risk management equipment: x  - Apply yellow socks and bracelet for high fall risk patients  - Consider moving patient to room near nurses station  Outcome: Progressing     Problem: DISCHARGE PLANNING  Goal: Discharge to home or other facility with appropriate resources  Description: INTERVENTIONS:  - Identify barriers to discharge w/patient and caregiver  - Arrange for needed discharge resources and transportation as appropriate  - Identify discharge learning needs (meds, wound care, etc )  - Arrange for interpretive services to assist at discharge as needed  - Refer to Case Management Department for coordinating discharge planning if the patient needs post-hospital services based on physician/advanced practitioner order or complex needs related to functional status, cognitive ability, or social support system  Outcome: Progressing     Problem: Knowledge Deficit  Goal: Patient/family/caregiver demonstrates understanding of disease process, treatment plan, medications, and discharge instructions  Description: Complete learning assessment and assess knowledge base  Interventions:  - Provide teaching at level of understanding  - Provide teaching via preferred learning methods  Outcome: Progressing     Problem: Nutrition/Hydration-ADULT  Goal: Nutrient/Hydration intake appropriate for improving, restoring or maintaining nutritional needs  Description: Monitor and assess patient's nutrition/hydration status for malnutrition  Collaborate with interdisciplinary team and initiate plan and interventions as ordered  Monitor patient's weight and dietary intake as ordered or per policy  Utilize nutrition screening tool and intervene as necessary  Determine patient's food preferences and provide high-protein, high-caloric foods as appropriate       INTERVENTIONS:  - Monitor oral intake, urinary output, labs, and treatment plans  - Assess nutrition and hydration status and recommend course of action  - Evaluate amount of meals eaten  - Assist patient with eating if necessary   - Allow adequate time for meals  - Recommend/ encourage appropriate diets, oral nutritional supplements, and vitamin/mineral supplements  - Order, calculate, and assess calorie counts as needed  - Recommend, monitor, and adjust tube feedings and TPN/PPN based on assessed needs  - Assess need for intravenous fluids  - Provide specific nutrition/hydration education as appropriate  - Include patient/family/caregiver in decisions related to nutrition  Outcome: Progressing     Problem: CARDIOVASCULAR - ADULT  Goal: Maintains optimal cardiac output and hemodynamic stability  Description: INTERVENTIONS:  - Monitor I/O, vital signs and rhythm  - Monitor for S/S and trends of decreased cardiac output  - Administer and titrate ordered vasoactive medications to optimize hemodynamic stability  - Assess quality of pulses, skin color and temperature  - Assess for signs of decreased coronary artery perfusion  - Instruct patient to report change in severity of symptoms  Outcome: Progressing  Goal: Absence of cardiac dysrhythmias or at baseline rhythm  Description: INTERVENTIONS:  - Continuous cardiac monitoring, vital signs, obtain 12 lead EKG if ordered  - Administer antiarrhythmic and heart rate control medications as ordered  - Monitor electrolytes and administer replacement therapy as ordered  Outcome: Progressing     Problem: GENITOURINARY - ADULT  Goal: Urinary catheter remains patent  Description: INTERVENTIONS:  - Assess patency of urinary catheter  - If patient has a chronic ball, consider changing catheter if non-functioning  - Follow guidelines for intermittent irrigation of non-functioning urinary catheter  Outcome: Progressing     Problem: METABOLIC, FLUID AND ELECTROLYTES - ADULT  Goal: Electrolytes maintained within normal limits  Description: INTERVENTIONS:  - Monitor labs and assess patient for signs and symptoms of electrolyte imbalances  - Administer electrolyte replacement as ordered  - Monitor response to electrolyte replacements, including repeat lab results as appropriate  - Instruct patient on fluid and nutrition as appropriate  Outcome: Progressing  Goal: Fluid balance maintained  Description: INTERVENTIONS:  - Monitor labs   - Monitor I/O and WT  - Instruct patient on fluid and nutrition as appropriate  - Assess for signs & symptoms of volume excess or deficit  Outcome: Progressing  Goal: Glucose maintained within target range  Description: INTERVENTIONS:  - Monitor Blood Glucose as ordered  - Assess for signs and symptoms of hyperglycemia and hypoglycemia  - Administer ordered medications to maintain glucose within target range  - Assess nutritional intake and initiate nutrition service referral as needed  Outcome: Progressing     Problem: SKIN/TISSUE INTEGRITY - ADULT  Goal: Skin Integrity remains intact(Skin Breakdown Prevention)  Description: Assess:  -Perform Sanjay assessment every x  -Clean and moisturize skin every x  -Inspect skin when repositioning, toileting, and assisting with ADLS  -Assess under medical devices such as x every x  -Assess extremities for adequate circulation and sensation     Bed Management:  -Have minimal linens on bed & keep smooth, unwrinkled  -Change linens as needed when moist or perspiring  -Avoid sitting or lying in one position for more than x hours while in bed  -Keep HOB at [facility]     Toileting:  -Offer bedside commode  -Assess for incontinence every x  -Use incontinent care products after each incontinent episode such as x    Activity:  -Mobilize patient x times a day  -Encourage activity and walks on unit  -Encourage or provide ROM exercises   -Turn and reposition patient every x Hours  -Use appropriate equipment to lift or move patient in bed  -Instruct/ Assist with weight shifting every x when out of bed in chair  -Consider limitation of chair time x hour intervals    Skin Care:  -Avoid use of baby powder, tape, friction and shearing, hot water or constrictive clothing  -Relieve pressure over bony prominences using x  -Do not massage red bony areas    Next Steps:  -Teach patient strategies to minimize risks such as x   -Consider consults to  interdisciplinary teams such as x  Outcome: Progressing  Goal: Incision(s), wounds(s) or drain site(s) healing without S/S of infection  Description: INTERVENTIONS  - Assess and document dressing, incision, wound bed, drain sites and surrounding tissue  - Provide patient and family education  - Perform skin care/dressing changes every x  Outcome: Progressing     Problem: HEMATOLOGIC - ADULT  Goal: Maintains hematologic stability  Description: INTERVENTIONS  - Assess for signs and symptoms of bleeding or hemorrhage  - Monitor labs  - Administer supportive blood products/factors as ordered and appropriate  Outcome: Progressing     Problem: MUSCULOSKELETAL - ADULT  Goal: Maintain or return mobility to safest level of function  Description: INTERVENTIONS:  - Assess patient's ability to carry out ADLs; assess patient's baseline for ADL function and identify physical deficits which impact ability to perform ADLs (bathing, care of mouth/teeth, toileting, grooming, dressing, etc )  - Assess/evaluate cause of self-care deficits   - Assess range of motion  - Assess patient's mobility  - Assess patient's need for assistive devices and provide as appropriate  - Encourage maximum independence but intervene and supervise when necessary  - Involve family in performance of ADLs  - Assess for home care needs following discharge   - Consider OT consult to assist with ADL evaluation and planning for discharge  - Provide patient education as appropriate  Outcome: Progressing  Goal: Maintain proper alignment of affected body part  Description: INTERVENTIONS:  - Support, maintain and protect limb and body alignment  - Provide patient/ family with appropriate education  Outcome: Progressing     Problem: Potential for Falls  Goal: Patient will remain free of falls  Description: INTERVENTIONS:  - Educate patient/family on patient safety including physical limitations  - Instruct patient to call for assistance with activity   - Consult OT/PT to assist with strengthening/mobility   - Keep Call bell within reach  - Keep bed low and locked with side rails adjusted as appropriate  - Keep care items and personal belongings within reach  - Initiate and maintain comfort rounds  - Make Fall Risk Sign visible to staff  - Offer Toileting every x Hours, in advance of need  - Initiate/Maintain xalarm  - Obtain necessary fall risk management equipment: x  - Apply yellow socks and bracelet for high fall risk patients  - Consider moving patient to room near nurses station  Outcome: Progressing

## 2023-04-17 NOTE — TELEMEDICINE
Hayde Go REQUIRED DOCUMENTATION:     1  This service was provided via Telemedicine  2  Provider located at [facility]  3  TeleMed provider: Abi Calixto MD   4  Identify all parties in room with patient during tele consult:  none  5  Patient was then informed that this was a Telemedicine visit and that the exam was being conducted confidentially over secure lines  My office door was closed  No one else was in the room  Patient acknowledged consent and understanding of privacy and security of the Telemedicine visit, and gave us permission to have the assistant stay in the room in order to assist with the history and to conduct the exam   I informed the patient that I have reviewed their record in Epic and presented the opportunity for them to ask any questions regarding the visit today  The patient agreed to participate  Progress Note - Acute Pain Service    Alisia Ant 74 y o  male MRN: [id number]  Unit/Bed#: -01 Encounter: [id number]      Assessment:   Smita Carmen a 74 y  o  male with a past medical history significant for hepatosteatosis, bipolar 2 disorder, diabetes mellitus complicated by peripheral neuropathy, chronic pain syndrome in the setting of lumbar spinal stenosis with neurogenic claudication and lumbar radiculopathy status post spinal cord stimulator trial with chronic opioid dependence currently on tramadol 50 mg twice daily at home (follows with [facility]), anxiety with benzodiazepine dependence ordered clonazepam 1 mg twice daily at home (follows with ) who presents for surgical management of lumbar spondylosis with spinal stenosis   Patient underwent T12-S1 posterior instrumented fixation fusion, L1-L2 and L4-L5 posterior decompression with bilateral laminectomy and total facetectomy, L2-L3 and L3-L4 posterior decompression with bilateral laminectomy and medial facetectomy, L5-S1 bilateral laminoforaminotomy for decompression on 4/11/2023   Acute pain service has been consulted for postoperative pain management  Ketamine infusion was started postoperatively and was discontinued on 4/16/2023      In terms of substance use history, patient states that he is prescribed tramadol and clonazepam which he takes each twice daily  [facility] states that he used to have a problem with alcohol and smoking cigarettes but he states that he quit on his own in [address] [place] [address] does state that he has 1-2 beers occasionally with his brother but states he is only had alcohol one of the last 30 days  [facility] also has a medical marijuana card however states he does not use this because he did not find this effective for his pain control  Of note, patient reports losing his psychiatrist and therapist recently  [facility] is unable to find these outpatient and is asking for assistance      On evaluation, patient states that his back pain is completely controlled however states that he has right back pain that wraps around his flank to the front which is also present on the left side but to a much lesser extent  He states his pain was somewhat improved with a bowel movement  This pain is tender to palpation    He finds the oxycodone minimally effective         Plan:   Continue clonazepam to 1 mg p o  twice daily (home med)  Continue acetaminophen 975 mg p o  every 8 hours scheduled  Discontinue intravenous hydromorphone  Discontinue oxycodone  Start hydromorphone 2 mg p o  every 3 hours as needed for moderate pain  Start hydromorphone 4 mg p o  every 3 hours as needed for severe pain  Continue gabapentin 300 mg p o  twice daily  Continue methocarbamol 750 mg p o  every 6 hours scheduled  Continue lidocaine patch x2 to the bilateral paraspinal lumbar region 12 hours on/12 hours off  Recommend use of heating pad as often as possible to back/flank avoiding incision  Continue naloxone as needed for respiratory depression/opioid reversal  Bowel regimen per primary team to avoid opioid-induced constipation  Appreciate CRS assistance    [facility] will continue to follow  Please contact Acute Pain Service - [facility] via [employer] from [phone] with additional questions or concerns  See Stella or Jeannie for additional contacts and after hours information  Pain History  Current pain location(s): R flank  Pain Scale:   9-10  Quality: sharp  24 hour history: Patient is remained hemodynamically stable with some tachycardia at times  He is not requiring supplemental oxygen  He is remained afebrile  Labs from today significant for sodium 132, creatinine 0 88 with EGFR 90, WBC 11 44, hemoglobin 8 8 with MCV 87, platelets 916  Opioid requirement previous 24 hours:   Oxycodone 35 mg p o  Hydromorphone 0 6 mg IV    Meds/Allergies   all current active meds have been reviewed    Allergies   Allergen Reactions   • Abilify [Aripiprazole] Tremor     Shaking     • Cephalexin Rash   • [employer] Allergic Rhinitis   • Pregabalin Tremor     Lyrica - shaking feeling       Objective     Temp:  [97 6 °F (36 4 °C)-98 1 °F (36 7 °C)] 97 7 °F (36 5 °C)  HR:  [] 110  Resp:  [18] 18  BP: (110-123)/(63-77) 123/77    Physical Exam  Vitals and nursing note reviewed  Constitutional:       General: He is in acute distress  Appearance: Normal appearance  He is not ill-appearing or toxic-appearing  HENT:      Head: Normocephalic and atraumatic  Eyes:      General: No scleral icterus  Pulmonary:      Effort: Pulmonary effort is normal  No respiratory distress  Genitourinary:     Comments: Mendoza in place  Neurological:      General: No focal deficit present  Mental Status: He is alert and oriented to person, place, and time        Comments: POSS 2   Psychiatric:         Behavior: Behavior normal          Lab Results:   Results from last 7 days   Lab Units 04/17/23  0345   WBC Thousand/uL 11 44*   HEMOGLOBIN g/dL 8 8*   HEMATOCRIT % 28 3*   PLATELETS Thousands/uL 413*      Results from last 7 days   Lab Units 04/17/23  0345 04/13/23  4559 04/12/23  0429   POTASSIUM mmol/L 3 8   < > 3 3*   CHLORIDE mmol/L 94*   < > 100   CO2 mmol/L 25   < > 33*   BUN mg/dL 13   < > 11   CREATININE mg/dL 0 88   < > 0 73   CALCIUM mg/dL 9 4   < > 8 5   ALK PHOS U/L  --   --  63   ALT U/L  --   --  16   AST U/L  --   --  31    < > = values in this interval not displayed  Imaging Studies: I have personally reviewed pertinent reports  EKG, Pathology, and Other Studies: I have personally reviewed pertinent reports  Please note that the APS provides consultative services regarding pain management only  With the exception of ketamine and epidural infusions and except when indicated, final decisions regarding starting or changing doses of analgesic medications are at the discretion of the consulting service  Off hours consultation and/or medication management is generally not available      Lauri Mccain MD  Acute Pain Service

## 2023-04-17 NOTE — PLAN OF CARE
Problem: PHYSICAL THERAPY ADULT  Goal: Performs mobility at highest level of function for planned discharge setting  See evaluation for individualized goals  Description: Treatment/Interventions: Functional transfer training, LE strengthening/ROM, Elevations, Therapeutic exercise, Endurance training, Patient/family training, Equipment eval/education, Bed mobility, Gait training  Equipment Recommended: [place], Wheelchair       See flowsheet documentation for full assessment, interventions and recommendations  Note: Prognosis: Guarded  Problem List: Decreased strength, Decreased endurance, Impaired balance, Decreased mobility, Obesity, Pain  Assessment: Therapist responded to chair alarm going off, entered room to find pt sitting on edge of the leg rest of the recliner chair  RN Seth Daly quickly enters to assist, pt is mod A x 2 to perform stand from recliner chair, and then requires mod A x 2 to perform SPT to EOB  Immediately upon sitting EOB, pt attempts to lay down, however pt too close to edge of bed  Provided education to wait and pt then requires mod A x 2 to return supine in bed  Urine everywhere, pt in room w/ RNs upon therapist exit  Pt continues to require Level 2 upon DC  Barriers to Discharge: Inaccessible home environment, Decreased caregiver support          See flowsheet documentation for full assessment

## 2023-04-17 NOTE — PROGRESS NOTES
"Progress Note - Snow Alvarado 74 y o  male MRN: [id number]    Unit/Bed#: [facility] Encounter: [id number]      Assessment:    POD 7 s/p T12- S1 Lumbar decompression and fusion  -pain controlled well this am  -PT/OT  -OOB as tolerated  -needs to go to rehab-awaiting placement  - Seen and examined with Dr Sapphire Wood, pt states he feels well and may be ok to go to rehab today    Urinary retention  -ball in for three days-voiding on his own  Cont PVRs for now    Tachycardia  -denies cp, sob  -cta neg  -suspect some anxiety  -cont to monitor  -repeat vitals  -not on oxygen, will ambulate and check vitals    ABLA  -most likely from surgery  -Hgb stable,cont to monitor  -tachycardia, off NC, denies dizziness and cp  -trend labs    DM2, Depression  -SSI,Home meds      Subjective: -  Doing well  States back pain better with heating pad      Objective:     Vitals: Blood pressure 123/77, pulse (!) 110, temperature 97 7 °F (36 5 °C), resp  rate 18, height 5' 8\" (1 727 m), weight 83 7 kg (184 lb 8 4 oz), SpO2 99 %  ,Body mass index is 28 06 kg/m²        Intake/Output Summary (Last 24 hours) at 4/17/2023 0902  Last data filed at 4/17/2023 0553  Gross per 24 hour   Intake 840 ml   Output 2550 ml   Net -1710 ml       Physical Exam:   General appearance: alert and oriented, in no acute distress  Head: Normocephalic, without obvious abnormality, atraumatic, sclerae anicteric, mucous membranes moist  Neck: no JVD and supple, symmetrical, trachea midline  Lungs: clear to auscultation, no wheezes or rales  Heart:   Mild tachycardia, regular rhythm, S1-S2 normal, no murmur  Abdomen:  non distended, soft, no guarding, no rebound, active bowel sounds  Extremities:   No edema, redness or tenderness in the calves or thighs  Strength is 5/5  Drain in place  Incision c/d/i with dressing in place  Skin: Warm, dry  Nursing notes and vital signs reviewed      Invasive Devices     Peripheral Intravenous Line  Duration           Peripheral IV 04/15/23 " Right Forearm 2 days          Drain  Duration           Urethral Catheter 16 Fr  2 days                Lab, Imaging and other studies: I have personally reviewed pertinent reports      VTE Pharmacologic Prophylaxis: Heparin  VTE Mechanical Prophylaxis: sequential compression device

## 2023-04-17 NOTE — PHYSICAL THERAPY NOTE
PHYSICAL THERAPY TREATMENT NOTE    Patient Name: Alisia Cain  Today's Date: 4/17/2023 04/17/23 1445   PT Last Visit   PT Visit Date 04/17/23   Note Type   Note Type Treatment   Pain Assessment   Pain Assessment Tool 0-10   Pain Score 7   Pain Location/Orientation Orientation: Lower; Location: Back   Restrictions/Precautions   Weight Bearing Precautions Per Order No   Other Precautions Chair Alarm; Bed Alarm; Fall Risk;Pain   General   Chart Reviewed Yes   Family/Caregiver Present No   Cognition   Arousal/Participation Alert   Attention Within functional limits   Orientation Level Oriented X4   Memory Within functional limits   Following Commands Follows all commands and directions without difficulty   Comments Therapist entered room due to pt's chair alarm going off and pt sitting on the edge of the leg rests   Bed Mobility   Sit to Supine 3  Moderate assistance   Additional items Assist x 2   Transfers   Sit to Stand 3  Moderate assistance   Additional items Assist x 2   Stand to Sit 3  Moderate assistance   Additional items Assist x 2   Stand pivot 3  Moderate assistance   Additional items Assist x 2   Balance   Static Sitting Fair   Static Standing Fair   Ambulatory Zero   Activity Tolerance   Activity Tolerance Patient limited by pain   Nurse Made Aware RN German   Assessment   Problem List Decreased strength;Decreased endurance; Impaired balance;Decreased mobility;Obesity;Pain   Assessment Therapist responded to chair alarm going off, entered room to find pt sitting on edge of the leg rest of the recliner chair  NATE Gonzalez quickly enters to assist, pt is mod A x 2 to perform stand from recliner chair, and then requires mod A x 2 to perform SPT to EOB  Immediately upon sitting EOB, pt attempts to lay down, however pt too close to edge of bed  Provided education to wait and pt then requires mod A x 2 to return supine in bed   Urine everywhere, pt in room w/ RNs upon therapist exit  Pt continues to require Level 2 upon DC   Goals   Patient Goals have less pain   STG Expiration Date 04/22/23   Short Term Goal #1 Patient will: Perform all bed mobility tasks w/ minx1 to improve pt's independence w/ repositioning for decrease risk of skin breakdown, Perform all transfers w/ minx1 consistently from various height surfaces in order to improve I w/ engagement w/ real-world environments/situations, Ambulate at least 25 ft  with roller walker w/ minx1 w/o LOB to facilitate return and engagement w/ previous living environment, Navigate 1 stairs w/ minx1 with unilateral handrail to either improve independence w/ entering home and/or so patient can fully access living areas in home and Increase all balance 1/2 grade to decrease risk for falls   PT Treatment Day 2   Plan   Treatment/Interventions Functional transfer training;LE strengthening/ROM; Endurance training; Therapeutic exercise;Cognitive reorientation;Patient/family training;Bed mobility;Gait training;Equipment eval/education   PT Frequency 3-5x/wk   Recommendation   UB Rehab Discharge Recommendation (PT/OT) Level 2   Equipment Recommended Wheelchair;Walker   AM-PAC Basic Mobility Inpatient   Turning in Flat Bed Without Bedrails 3   Lying on Back to Sitting on Edge of Flat Bed Without Bedrails 3   Moving Bed to Chair 2   Standing Up From Chair Using Arms 1   Walk in Room 1   Climb 3-5 Stairs With Railing 1   Basic Mobility Inpatient Raw Score 11   Basic Mobility Standardized Score 30 25   Turning Head Towards Sound 4   Follow Simple Instructions 3   Low Function Basic Mobility Raw Score  18   Low Function Basic Mobility Standardized Score  29 25   Highest Level Of Mobility   JH-HLM Goal 4: Move to chair/commode   JH-HLM Achieved 4: Move to Long Supply   Education Provided Mobility training  (SAFETY for fall risk)   End of Consult   Patient Position at End of Consult Supine;Bed/Chair alarm activated; All needs within reach     The patient's AM-PAC Basic Mobility Inpatient Short Form Raw Score is 11  A Raw score of less than or equal to 16 suggests the patient may benefit from discharge to post-acute rehabilitation services  Please also refer to the recommendation of the Physical Therapist for safe discharge planning  Pt would continue to benefit from skilled PT during this admission in order to progress patient towards goals to decrease risk of falls and maximize independence       Eric Sandoval, PT, DPT

## 2023-04-17 NOTE — OCCUPATIONAL THERAPY NOTE
Occupational Therapy Cx Note     Patient Name: Letty Bowers  Today's Date: 4/17/2023  Problem List  Principal Problem:    Spinal stenosis of lumbar region with neurogenic claudication  Active Problems:    Benzodiazepine dependence (HCC)    Cirrhosis, alcoholic (HCC)    DM (diabetes mellitus) ([facility] [address] )    Lumbar spondylosis    PONV (postoperative nausea and vomiting)    Urinary retention            04/17/23 1049   OT Last Visit   OT Visit Date 04/17/23   Note Type   Note type Cancelled Session   Additional Comments Attempted to see pt for OT treatment session, however pt declined participating with therapy 2/2 pain  RN made aware             DENNIS Rodríguez/RUBI

## 2023-04-18 LAB
GLUCOSE SERPL-MCNC: 175 MG/DL (ref 65–140)
GLUCOSE SERPL-MCNC: 177 MG/DL (ref 65–140)
GLUCOSE SERPL-MCNC: 191 MG/DL (ref 65–140)
GLUCOSE SERPL-MCNC: 203 MG/DL (ref 65–140)
SARS-COV-2 RNA RESP QL NAA+PROBE: NEGATIVE

## 2023-04-18 RX ORDER — HYDROMORPHONE HYDROCHLORIDE 2 MG/1
4 TABLET ORAL EVERY 4 HOURS PRN
Status: DISCONTINUED | OUTPATIENT
Start: 2023-04-18 | End: 2023-04-22 | Stop reason: HOSPADM

## 2023-04-18 RX ORDER — HYDROMORPHONE HYDROCHLORIDE 2 MG/1
2 TABLET ORAL EVERY 4 HOURS PRN
Status: DISCONTINUED | OUTPATIENT
Start: 2023-04-18 | End: 2023-04-22 | Stop reason: HOSPADM

## 2023-04-18 RX ADMIN — GABAPENTIN 300 MG: 300 CAPSULE ORAL at 08:50

## 2023-04-18 RX ADMIN — GABAPENTIN 300 MG: 300 CAPSULE ORAL at 14:41

## 2023-04-18 RX ADMIN — LORATADINE 10 MG: 10 TABLET ORAL at 08:51

## 2023-04-18 RX ADMIN — METHOCARBAMOL 750 MG: 500 TABLET ORAL at 05:06

## 2023-04-18 RX ADMIN — FOLIC ACID 2000 MCG: 1 TABLET ORAL at 08:51

## 2023-04-18 RX ADMIN — INSULIN LISPRO 1 UNITS: 100 INJECTION, SOLUTION INTRAVENOUS; SUBCUTANEOUS at 08:44

## 2023-04-18 RX ADMIN — METHOCARBAMOL 750 MG: 500 TABLET ORAL at 12:19

## 2023-04-18 RX ADMIN — EMPAGLIFLOZIN 10 MG: 10 TABLET, FILM COATED ORAL at 08:51

## 2023-04-18 RX ADMIN — INSULIN LISPRO 2 UNITS: 100 INJECTION, SOLUTION INTRAVENOUS; SUBCUTANEOUS at 21:20

## 2023-04-18 RX ADMIN — HYDROMORPHONE HYDROCHLORIDE 4 MG: 2 TABLET ORAL at 14:40

## 2023-04-18 RX ADMIN — HYDROMORPHONE HYDROCHLORIDE 4 MG: 2 TABLET ORAL at 08:47

## 2023-04-18 RX ADMIN — HYDROMORPHONE HYDROCHLORIDE 4 MG: 2 TABLET ORAL at 04:06

## 2023-04-18 RX ADMIN — SERTRALINE HYDROCHLORIDE 100 MG: 100 TABLET ORAL at 08:51

## 2023-04-18 RX ADMIN — HEPARIN SODIUM 5000 UNITS: 5000 INJECTION INTRAVENOUS; SUBCUTANEOUS at 17:17

## 2023-04-18 RX ADMIN — LOSARTAN POTASSIUM 100 MG: 50 TABLET, FILM COATED ORAL at 08:50

## 2023-04-18 RX ADMIN — INSULIN LISPRO 2 UNITS: 100 INJECTION, SOLUTION INTRAVENOUS; SUBCUTANEOUS at 17:40

## 2023-04-18 RX ADMIN — METHOCARBAMOL 750 MG: 500 TABLET ORAL at 17:16

## 2023-04-18 RX ADMIN — HEPARIN SODIUM 5000 UNITS: 5000 INJECTION INTRAVENOUS; SUBCUTANEOUS at 00:53

## 2023-04-18 RX ADMIN — HEPARIN SODIUM 5000 UNITS: 5000 INJECTION INTRAVENOUS; SUBCUTANEOUS at 08:52

## 2023-04-18 RX ADMIN — TAMSULOSIN HYDROCHLORIDE 0.4 MG: 0.4 CAPSULE ORAL at 17:16

## 2023-04-18 RX ADMIN — HYDROMORPHONE HYDROCHLORIDE 4 MG: 2 TABLET ORAL at 19:21

## 2023-04-18 RX ADMIN — ACETAMINOPHEN 325MG 975 MG: 325 TABLET ORAL at 00:52

## 2023-04-18 RX ADMIN — HYDROXYZINE HYDROCHLORIDE 50 MG: 25 TABLET, FILM COATED ORAL at 21:20

## 2023-04-18 RX ADMIN — ACETAMINOPHEN 325MG 975 MG: 325 TABLET ORAL at 08:51

## 2023-04-18 RX ADMIN — Medication 5000 UNITS: at 09:03

## 2023-04-18 RX ADMIN — FLUTICASONE PROPIONATE 1 SPRAY: 50 SPRAY, METERED NASAL at 08:56

## 2023-04-18 RX ADMIN — CLONAZEPAM 1 MG: 1 TABLET ORAL at 17:16

## 2023-04-18 RX ADMIN — METOPROLOL SUCCINATE 75 MG: 50 TABLET, EXTENDED RELEASE ORAL at 08:51

## 2023-04-18 RX ADMIN — NIFEDIPINE 120 MG: 30 TABLET, FILM COATED, EXTENDED RELEASE ORAL at 08:52

## 2023-04-18 RX ADMIN — METHOCARBAMOL 750 MG: 500 TABLET ORAL at 00:53

## 2023-04-18 RX ADMIN — INSULIN LISPRO 1 UNITS: 100 INJECTION, SOLUTION INTRAVENOUS; SUBCUTANEOUS at 12:22

## 2023-04-18 RX ADMIN — ACETAMINOPHEN 325MG 975 MG: 325 TABLET ORAL at 17:17

## 2023-04-18 RX ADMIN — CLONAZEPAM 1 MG: 1 TABLET ORAL at 08:50

## 2023-04-18 RX ADMIN — LIDOCAINE 2 PATCH: 50 PATCH TOPICAL at 08:56

## 2023-04-18 RX ADMIN — GABAPENTIN 300 MG: 300 CAPSULE ORAL at 21:20

## 2023-04-18 RX ADMIN — HYDROCHLOROTHIAZIDE 25 MG: 25 TABLET ORAL at 08:51

## 2023-04-18 RX ADMIN — MIRTAZAPINE 30 MG: 15 TABLET, FILM COATED ORAL at 21:20

## 2023-04-18 NOTE — CASE MANAGEMENT
Case Management Discharge Planning Note    Patient name Kate Brito  Location /-97 MRN [id number]  : 1955 Date 2023       Current Admission Date: 2023  Current Admission Diagnosis:Spinal stenosis of lumbar region with neurogenic claudication   Patient Active Problem List    Diagnosis Date Noted   • Urinary retention 2023   • PONV (postoperative nausea and vomiting)    • Medical marijuana use    • Chronic bilateral low back pain without sciatica 2023   • Lumbar radiculopathy    • Spinal stenosis of lumbar region with neurogenic claudication 2023   • Chronic pain syndrome 2022   • Liver disease 08/10/2022   • Bronchitis, mucopurulent recurrent ([facility] [address] ) 2022   • Cirrhosis, alcoholic ([facility] [address] )    • Diabetic peripheral neuropathy ([facility] [address] ) 2022   • Herniated nucleus pulposus of lumbosacral region 2022   • Thyroid cancer ([facility] [address] ) 2021   • Alcoholism in remission ([facility] [address] ) 2021   • Benzodiazepine dependence ([facility] [address] ) 2021   • Bilateral adhesive capsulitis of shoulders 2021   • DM (diabetes mellitus) ([facility] [address] ) 2021   • Hypercholesterolemia 2021   • Lumbar spondylosis 2021      LOS (days): 7  Geometric Mean LOS (GMLOS) (days): 2 80  Days to GMLOS:-4 4     OBJECTIVE:  Risk of Unplanned Readmission Score: 28 42      Current admission status: Inpatient   Preferred Pharmacy:   [facility] [address], [address] [address]  Phone: [phone] Fax: [phone]    Primary Care Provider: Yandel Hough DO    Primary Insurance: [facility]  Secondary Insurance: [place] [address]:    Discharge planning discussed with[de-identified] patient  Freedom of Choice: Yes  Comments - Freedom of Choice: Pt is agreeable to rehab and picked [facility]  Cm discharge support is working on authorization with [facility]    YANN contacted family/caregiver?: No- see comments (pt is alert and oriented)  Were Treatment Team discharge recommendations reviewed with patient/caregiver?: Yes  Did patient/caregiver verbalize understanding of patient care needs?: Yes  Were patient/caregiver advised of the risks associated with not following Treatment Team discharge recommendations?: Yes    [address]         Is the patient interested in [facility] at discharge?: No    DME Referral Provided  Referral made for DME?: No    Other Referral/Resources/Interventions Provided:  Interventions: Short Term Rehab  Referral Comments: Pt is agreeable to rehab and [facility] discharge support is working on authorization with [facility]  Treatment Team Recommendation: Short Term Rehab  Discharge Destination Plan[de-identified] Short Term Rehab  Transport at Discharge : Wheelchair Render Kaushal     IMM Given (Date):: 04/18/23  IMM Given to[de-identified] Patient     Additional Comments: Pt is agreeable to rehab and [facility]  Cm discharge support is working on authorization with [facility]

## 2023-04-18 NOTE — TELEMEDICINE
REQUIRED DOCUMENTATION:     1  This service was provided via Telemedicine  2  Provider located at [facility]  3  TeleMed provider: Azar Daniels MD   4  Identify all parties in room with patient during tele consult:  none  5  Patient was then informed that this was a Telemedicine visit and that the exam was being conducted confidentially over secure lines  My office door was closed  No one else was in the room  Patient acknowledged consent and understanding of privacy and security of the Telemedicine visit, and gave us permission to have the assistant stay in the room in order to assist with the history and to conduct the exam   I informed the patient that I have reviewed their record in Epic and presented the opportunity for them to ask any questions regarding the visit today  The patient agreed to participate  Progress Note - Acute Pain Service    Katherine Brown 74 y o  male MRN: [id number]  Unit/Bed#: -01 Encounter: [id number]      Assessment:   Yosef Alejandro a 74 y  o  male with a past medical history significant for hepatosteatosis, bipolar 2 disorder, diabetes mellitus complicated by peripheral neuropathy, chronic pain syndrome in the setting of lumbar spinal stenosis with neurogenic claudication and lumbar radiculopathy status post spinal cord stimulator trial with chronic opioid dependence currently on tramadol 50 mg twice daily at home (follows with [facility]), anxiety with benzodiazepine dependence ordered clonazepam 1 mg twice daily at home (follows with ) who presents for surgical management of lumbar spondylosis with spinal stenosis   Patient underwent T12-S1 posterior instrumented fixation fusion, L1-L2 and L4-L5 posterior decompression with bilateral laminectomy and total facetectomy, L2-L3 and L3-L4 posterior decompression with bilateral laminectomy and medial facetectomy, L5-S1 bilateral laminoforaminotomy for decompression on 4/11/2023   Acute pain service has been consulted for postoperative pain management  Ketamine infusion was started postoperatively and was discontinued on 4/16/2023      In terms of substance use history, patient states that he is prescribed tramadol and clonazepam which he takes each twice daily  [facility] states that he used to have a problem with alcohol and smoking cigarettes but he states that he quit on his own in [address] [place] [address] does state that he has 1-2 beers occasionally with his brother but states he is only had alcohol one of the last 30 days  [facility] also has a medical marijuana card however states he does not use this because he did not find this effective for his pain control  Of note, patient reports losing his psychiatrist and therapist recently  [facility] is unable to find these outpatient and is asking for assistance      On evaluation, patient refers significant improvement in pain with opioid rotation but he finds that the heating pad was put really made a difference for him  He states that he was able to sleep several hours which is the first time since he has been in the hospital   He continues to have some mild tightness in his hips and waist but he states that this is tolerable    He states that he may be going to short-term rehab today and he feels that he is ready for this      Plan:   Continue clonazepam to 1 mg p o  twice daily (home med)  Continue acetaminophen 975 mg p o  every 8 hours scheduled  Decrease frequency of hydromorphone 2 mg p o  to every 4 hours as needed for moderate pain  Decrease frequency of hydromorphone 4 mg p o  to every 4 hours as needed for severe pain  Continue to wean hydromorphone as tolerated  Continue gabapentin 300 mg p o  twice daily  Continue methocarbamol 750 mg p o  every 6 hours scheduled  Continue lidocaine patch x2 to the bilateral paraspinal lumbar region 12 hours on/12 hours off  Recommend continuing the use of heating pad as often as possible to back/flank avoiding incision and lidocaine patches  Continue naloxone as needed for respiratory depression/opioid reversal  Bowel regimen per primary team to avoid opioid-induced constipation  Appreciate CRS assistance    APS will sign off at this time  Thank you for the consult  All opioids and other analgesics to be written at discretion of primary team  Please contact Acute Pain Service - [facility] via [employer] from [phone] with additional questions or concerns  See Stella or Jeannie for additional contacts and after hours information  Pain History  Current pain location(s): Bilateral hips and waist  Pain Scale:   8-10  Quality: Mild tightness  24 hour history: Patient was opioid rotated from oxycodone to oral hydromorphone  Intravenous hydromorphone was discontinued  Patient was recommended the use of a heating pad  Patient has been mildly tachycardic at times but otherwise largely hemodynamically stable  He has remained afebrile and is not requiring supplemental oxygen  No labs from today  CT PE study without PE or acute finding  Patient has had 3 bowel movements in the past 24 hours  Opioid requirement previous 24 hours:   Hydromorphone 18 mg p o  Meds/Allergies   all current active meds have been reviewed    Allergies   Allergen Reactions   • Abilify [Aripiprazole] Tremor     Shaking     • Cephalexin Rash   • [employer] Allergic Rhinitis   • Pregabalin Tremor     Lyrica - shaking feeling       Objective     Temp:  [97 6 °F (36 4 °C)-98 1 °F (36 7 °C)] 97 9 °F (36 6 °C)  HR:  [] 107  Resp:  [17-19] 18  BP: (110-154)/(66-77) 121/69    Physical Exam  Vitals and nursing note reviewed  Constitutional:       Appearance: Normal appearance  HENT:      Head: Normocephalic and atraumatic  Eyes:      General: No scleral icterus  Pulmonary:      Effort: Pulmonary effort is normal  No respiratory distress  Skin:     Coloration: Skin is not jaundiced or pale  Neurological:      Mental Status: He is alert and oriented to person, place, and time  Comments: POSS 1   Psychiatric:         Mood and Affect: Mood normal          Behavior: Behavior normal          Thought Content: Thought content normal          Judgment: Judgment normal          Lab Results:   Results from last 7 days   Lab Units 04/17/23  0345   WBC Thousand/uL 11 44*   HEMOGLOBIN g/dL 8 8*   HEMATOCRIT % 28 3*   PLATELETS Thousands/uL 413*      Results from last 7 days   Lab Units 04/17/23  0345 04/13/23  0432 04/12/23  0429   POTASSIUM mmol/L 3 8   < > 3 3*   CHLORIDE mmol/L 94*   < > 100   CO2 mmol/L 25   < > 33*   BUN mg/dL 13   < > 11   CREATININE mg/dL 0 88   < > 0 73   CALCIUM mg/dL 9 4   < > 8 5   ALK PHOS U/L  --   --  63   ALT U/L  --   --  16   AST U/L  --   --  31    < > = values in this interval not displayed  Imaging Studies: I have personally reviewed pertinent reports  EKG, Pathology, and Other Studies: I have personally reviewed pertinent reports  Please note that the APS provides consultative services regarding pain management only  With the exception of ketamine and epidural infusions and except when indicated, final decisions regarding starting or changing doses of analgesic medications are at the discretion of the consulting service  Off hours consultation and/or medication management is generally not available      Prudencio Flores MD  Acute Pain Service

## 2023-04-18 NOTE — OCCUPATIONAL THERAPY NOTE
Occupational Therapy Cx Note     Patient Name: Shanta Foley  Today's Date: 4/18/2023  Problem List  Principal Problem:    Spinal stenosis of lumbar region with neurogenic claudication  Active Problems:    Benzodiazepine dependence (HCC)    Cirrhosis, alcoholic (HCC)    DM (diabetes mellitus) ([facility] [address] )    Lumbar spondylosis    PONV (postoperative nausea and vomiting)    Urinary retention            04/18/23 1049   Note Type   Note type Cancelled Session   Additional Comments Multiple attempts made to see pt  Pt most recently endorsing that he needs to eat breakfast prior to working with therapy  Will hold and follow at later time/date             Oskar Viera OTR/L

## 2023-04-18 NOTE — OCCUPATIONAL THERAPY NOTE
Occupational Therapy Tx Note     Patient Name: Patel Ricketts  DCYTS'P Date: 4/18/2023  Problem List  Principal Problem:    Spinal stenosis of lumbar region with neurogenic claudication  Active Problems:    Benzodiazepine dependence (HCC)    Cirrhosis, alcoholic (HCC)    DM (diabetes mellitus) ([facility] [address] )    Lumbar spondylosis    PONV (postoperative nausea and vomiting)    Urinary retention            04/18/23 1125   OT Last Visit   OT Visit Date 04/18/23   Note Type   Note Type Treatment   Pain Assessment   Pain Assessment Tool 0-10   Pain Score 10 - Worst Possible Pain   Hospital Pain Intervention(s) Rest   Restrictions/Precautions   Other Precautions Fall Risk;Pain; Chair Alarm; Bed Alarm   ADL   UB Dressing Assistance 5  Supervision/Setup   UB Dressing Deficit Verbal cueing;Supervision/safety   Bed Mobility   Sit to Supine 4  Minimal assistance   Additional items Assist x 1;Verbal cues; Bedrails; Increased time required   Transfers   Sit to Stand 4  Minimal assistance   Additional items Assist x 1;Verbal cues   Stand to Sit 4  Minimal assistance   Additional items Assist x 1;Verbal cues   Functional Mobility   Functional Mobility 4  Minimal assistance   Additional Comments x 1, RW   Cognition   Overall Cognitive Status WFL   Arousal/Participation Alert   Attention Within functional limits   Orientation Level Oriented X4   Memory Within functional limits   Following Commands Follows all commands and directions without difficulty   Activity Tolerance   Activity Tolerance Patient tolerated treatment well   Medical Staff Made Aware PT NATE Hoyoses   Assessment   Assessment Pt seen for OT tx session with focus on functional balance, functional mobility, ADL status, and transfer safety  Patient agreeable to OT treatment session  Pt received supine in bed  Performed bed mobility with min A x 1 via log rolling technique  Performed UB dressing task with min A   Performed transfers and functional mobility with min Ax 1 with use of RW  Pt continues to be limited by pain  Patient continues to be functioning below baseline level, occupational performance remains limited secondary to factors listed above, and pt at increased risk for falls and injury  The patient's raw score on the AM-PAC Daily Activity inpatient short form is 16, standardized score is 35 96, less than 39 4  Patients at this level are likely to benefit from DC to post-acute rehabilitation services  Please refer to the recommendation of the Occupational Therapist for safe DC planning  Patient to benefit from continued Occupational Therapy treatment while in the hospital to address deficits as defined above and maximize level of functional independence with ADLs and functional mobility  Pt left with call bell in reach, tray table in reach, needs met, chair alarm activated  Plan   Treatment Interventions ADL retraining;Functional transfer training;Patient/family training; Compensatory technique education; Endurance training; Activityengagement   Goal Expiration Date 04/22/23   OT Treatment Day 1   OT Frequency 3-5x/wk   Recommendation   UB Rehab Discharge Recommendation (PT/OT) Level 2   AM-PAC Daily Activity Inpatient   Lower Body Dressing 1   Bathing 2   Toileting 2   Upper Body Dressing 3   Grooming 4   Eating 4   Daily Activity Raw Score 16   Daily Activity Standardized Score (Calc for Raw Score >=11) 35 96   -[facility] Applied Cognition Inpatient   Following a Speech/Presentation 4   Understanding Ordinary Conversation 4   Taking Medications 4   Remembering Where Things Are Placed or Put Away 4   Remembering List of 4-5 Errands 4   Taking Care of Complicated Tasks 4   Applied Cognition Raw Score 24   Applied Cognition Standardized Score 62 21   End of Consult   Education Provided Yes   Patient Position at End of Consult Bed/Chair alarm activated; All needs within reach; Bedside chair   Nurse Communication Nurse aware of consult           DENNIS Barrett/L

## 2023-04-18 NOTE — PLAN OF CARE
Problem: Prexisting or High Potential for Compromised Skin Integrity  Goal: Skin integrity is maintained or improved  Description: INTERVENTIONS:  - Identify patients at risk for skin breakdown  - Assess and monitor skin integrity  - Assess and monitor nutrition and hydration status  - Monitor labs   - Assess for incontinence   - Turn and reposition patient  - Assist with mobility/ambulation  - Relieve pressure over bony prominences  - Avoid friction and shearing  - Provide appropriate hygiene as needed including keeping skin clean and dry  - Evaluate need for skin moisturizer/barrier cream  - Collaborate with interdisciplinary team   - Patient/family teaching  - Consider wound care consult   Outcome: Progressing     Problem: MOBILITY - ADULT  Goal: Maintain or return to baseline ADL function  Description: INTERVENTIONS:  -  Assess patient's ability to carry out ADLs; assess patient's baseline for ADL function and identify physical deficits which impact ability to perform ADLs (bathing, care of mouth/teeth, toileting, grooming, dressing, etc )  - Assess/evaluate cause of self-care deficits   - Assess range of motion  - Assess patient's mobility; develop plan if impaired  - Assess patient's need for assistive devices and provide as appropriate  - Encourage maximum independence but intervene and supervise when necessary  - Involve family in performance of ADLs  - Assess for home care needs following discharge   - Consider OT consult to assist with ADL evaluation and planning for discharge  - Provide patient education as appropriate  Outcome: Progressing     Problem: SAFETY ADULT  Goal: Maintain or return to baseline ADL function  Description: INTERVENTIONS:  -  Assess patient's ability to carry out ADLs; assess patient's baseline for ADL function and identify physical deficits which impact ability to perform ADLs (bathing, care of mouth/teeth, toileting, grooming, dressing, etc )  - Assess/evaluate cause of self-care deficits   - Assess range of motion  - Assess patient's mobility; develop plan if impaired  - Assess patient's need for assistive devices and provide as appropriate  - Encourage maximum independence but intervene and supervise when necessary  - Involve family in performance of ADLs  - Assess for home care needs following discharge   - Consider OT consult to assist with ADL evaluation and planning for discharge  - Provide patient education as appropriate  Outcome: Progressing

## 2023-04-18 NOTE — CASE MANAGEMENT
Parish Jay 52 has received approved authorization from insurance:     Called in by Rep:Arie [phone] P#    Authorization received for: [facility]  Facility: [address] [place] [address] #: [id number] Danial Felix)  Start of Care:04/18/23  Next Review Date:04/20/23  Care Coordinator: Maria Luz Templeton P# [phone]   Submit next review via fax to :[phone]   Care Manager notified: Juanito Boyer

## 2023-04-18 NOTE — PHYSICAL THERAPY NOTE
PHYSICAL THERAPY NOTE       04/18/23 1120   PT Last Visit   PT Visit Date 04/18/23   Note Type   Note Type Treatment   Pain Assessment   Pain Score 10 - Worst Possible Pain   Pain Location/Orientation Location: Back   Hospital Pain Intervention(s) Medication (See MAR)   Bed Mobility   Sit to Supine 4  Minimal assistance   Additional items Assist x 1;Bedrails; Increased time required;Verbal cues  (Verbal cues for log roll technique)   Transfers   Sit to Stand 4  Minimal assistance   Additional items Assist x 1; Armrests; Increased time required;Verbal cues   Stand to Sit 4  Minimal assistance   Additional items Assist x 1; Armrests; Increased time required;Verbal cues   Ambulation/Elevation   Gait pattern Narrow BRANDT; Decreased foot clearance   Gait Assistance 4  Minimal assist   Additional items Assist x 1;Verbal cues; Tactile cues   Assistive Device Rolling walker   Distance 25ft   Ambulation/Elevation Additional Comments Very slow paced gait with heavy reliance on RW  Balance   Static Sitting Good   Dynamic Sitting Fair +   Static Standing Fair +   Dynamic Standing Fair   Ambulatory Fair   Endurance Deficit   Endurance Deficit Yes   Endurance Deficit Description Limited by pain   Activity Tolerance   Activity Tolerance Patient limited by pain   Medical Staff Made Aware Co-treat with OT, [address] [place] [address]   Nurse Made Aware RNVenancio made awre of functional mobility   Assessment   Prognosis Guarded   Problem List Decreased strength;Decreased endurance; Impaired balance;Decreased mobility; Impaired judgement;Pain   Assessment Patient was received supine in bed  Needed encouragement to participate  Patient was educated on spinal precautions and the log roll technique  He performed bed mobility, transfers, and ambulation all at a min A level  Increased time to complete all mobility due to pain   Patient is deconditioned and presents with overall decreased strength, balance, endurance and mobility  Level 2 is recommended  The patient's AM-PAC Basic Mobility Inpatient Short Form Raw Score is 16  A Raw score of less than or equal to 17 suggests the patient may benefit from discharge to post-acute rehabilitation services  Please also refer to the recommendation of the Physical Therapist for safe discharge planning  Barriers to Discharge Inaccessible home environment;Decreased caregiver support   Goals   Patient Goals To have less pain   STG Expiration Date 04/22/23   Plan   Treatment/Interventions Functional transfer training;LE strengthening/ROM; Therapeutic exercise; Endurance training;Patient/family training;Equipment eval/education; Bed mobility;Gait training;Spoke to nursing;OT   Progress Slow progress, decreased activity tolerance   PT Frequency 3-5x/wk   Recommendation   UB Rehab Discharge Recommendation (PT/OT) Level 2   AM-PAC Basic Mobility Inpatient   Turning in Flat Bed Without Bedrails 3   Lying on Back to Sitting on Edge of Flat Bed Without Bedrails 3   Moving Bed to Chair 3   Standing Up From Chair Using Arms 3   Walk in Room 3   Climb 3-5 Stairs With Railing 1   Basic Mobility Inpatient Raw Score 16   Basic Mobility Standardized Score 38 32   Highest Level Of Mobility   JH-HLM Goal 5: Stand one or more mins   JH-HLM Achieved 7: Walk 25 feet or more   Education   Education Provided Mobility training;Assistive device   Patient Reinforcement needed   End of Consult   Patient Position at End of Consult Bedside chair;Bed/Chair alarm activated; All needs within reach     Soha Page            Patient Name: Radha Izaguirre  SAUNDRA'KEATON Date: 4/18/2023

## 2023-04-18 NOTE — CASE MANAGEMENT
Parish Thompson 52 received request for authorization from Care Manager  Authorization request for: Kash [address]   YME:[id number]  Facility MD:  Dr Victoria  NPI: [id number]  Authorization initiated by contacting insurance: [employer] Via: [place]  Pending Reference #:[id number]  Clinicals submitted via:  Fax via [employer]

## 2023-04-18 NOTE — PLAN OF CARE
Problem: PHYSICAL THERAPY ADULT  Goal: Performs mobility at highest level of function for planned discharge setting  See evaluation for individualized goals  Description: Treatment/Interventions: Functional transfer training, LE strengthening/ROM, Elevations, Therapeutic exercise, Endurance training, Patient/family training, Equipment eval/education, Bed mobility, Gait training  Equipment Recommended: Madahvi Curdutch, Wheelchair       See flowsheet documentation for full assessment, interventions and recommendations  Outcome: Progressing  Note: Prognosis: Guarded  Problem List: Decreased strength, Decreased endurance, Impaired balance, Decreased mobility, Impaired judgement, Pain  Assessment: Patient was received supine in bed  Needed encouragement to participate  Patient was educated on spinal precautions and the log roll technique  He performed bed mobility, transfers, and ambulation all at a min A level  Increased time to complete all mobility due to pain  Patient is deconditioned and presents with overall decreased strength, balance, endurance and mobility  Level 2 is recommended  The patient's AM-PAC Basic Mobility Inpatient Short Form Raw Score is 16  A Raw score of less than or equal to 17 suggests the patient may benefit from discharge to post-acute rehabilitation services  Please also refer to the recommendation of the Physical Therapist for safe discharge planning  Barriers to Discharge: Inaccessible home environment, Decreased caregiver support          See flowsheet documentation for full assessment

## 2023-04-18 NOTE — CASE MANAGEMENT
Case Management Discharge Planning Note    Patient name Chica Ashby  Location /-68 MRN [id number]  : 1955 Date 2023       Current Admission Date: 2023  Current Admission Diagnosis:Spinal stenosis of lumbar region with neurogenic claudication   Patient Active Problem List    Diagnosis Date Noted   • Urinary retention 2023   • PONV (postoperative nausea and vomiting)    • Medical marijuana use    • Chronic bilateral low back pain without sciatica 2023   • Lumbar radiculopathy    • Spinal stenosis of lumbar region with neurogenic claudication 2023   • Chronic pain syndrome 2022   • Liver disease 08/10/2022   • Bronchitis, mucopurulent recurrent ([address] ) 2022   • Cirrhosis, alcoholic ([facility] ) 6612   • Diabetic peripheral neuropathy ([facility] ) 2022   • Herniated nucleus pulposus of lumbosacral region 2022   • Thyroid cancer ([facility] ) 2021   • Alcoholism in remission ([address] ) 2021   • Benzodiazepine dependence ([facility] ) 2021   • Bilateral adhesive capsulitis of shoulders 2021   • DM (diabetes mellitus) ([facility] [address] ) 2021   • Hypercholesterolemia 2021   • Lumbar spondylosis 2021      LOS (days): 7  Geometric Mean LOS (GMLOS) (days): 2 80  Days to GMLOS:-4 6     OBJECTIVE:  Risk of Unplanned Readmission Score: 28 48         Current admission status: Inpatient   Preferred Pharmacy:   [facility] [address], [address] [facility]   [address] [facility] [address]  Phone: [phone] Fax: [phone]    Primary Care Provider: Douglas Nieto DO    Primary Insurance: [facility] RE[facility]  Secondary Insurance: [employer] [address] Number: [id number] (OLEG Nahun Hdez)

## 2023-04-19 ENCOUNTER — APPOINTMENT (INPATIENT)
Dept: CT IMAGING | Facility: HOSPITAL | Age: 68
End: 2023-04-19

## 2023-04-19 ENCOUNTER — APPOINTMENT (OUTPATIENT)
Dept: RADIOLOGY | Facility: HOSPITAL | Age: 68
End: 2023-04-19

## 2023-04-19 PROBLEM — D64.9 ANEMIA: Status: ACTIVE | Noted: 2023-04-19

## 2023-04-19 LAB
BACTERIA UR QL AUTO: ABNORMAL /HPF
BASOPHILS # BLD MANUAL: 0 THOUSAND/UL (ref 0–0.1)
BASOPHILS NFR MAR MANUAL: 0 % (ref 0–1)
BILIRUB UR QL STRIP: NEGATIVE
CLARITY UR: ABNORMAL
COLOR UR: YELLOW
EOSINOPHIL # BLD MANUAL: 0 THOUSAND/UL (ref 0–0.4)
EOSINOPHIL NFR BLD MANUAL: 0 % (ref 0–6)
ERYTHROCYTE [DISTWIDTH] IN BLOOD BY AUTOMATED COUNT: 16.6 % (ref 11.6–15.1)
GLUCOSE SERPL-MCNC: 175 MG/DL (ref 65–140)
GLUCOSE SERPL-MCNC: 178 MG/DL (ref 65–140)
GLUCOSE SERPL-MCNC: 183 MG/DL (ref 65–140)
GLUCOSE SERPL-MCNC: 250 MG/DL (ref 65–140)
GLUCOSE UR STRIP-MCNC: ABNORMAL MG/DL
HCT VFR BLD AUTO: 27.2 % (ref 36.5–49.3)
HGB BLD-MCNC: 8.5 G/DL (ref 12–17)
HGB UR QL STRIP.AUTO: NEGATIVE
KETONES UR STRIP-MCNC: ABNORMAL MG/DL
LACTATE SERPL-SCNC: 0.9 MMOL/L (ref 0.5–2)
LEUKOCYTE ESTERASE UR QL STRIP: ABNORMAL
LYMPHOCYTES # BLD AUTO: 1.56 THOUSAND/UL (ref 0.6–4.47)
LYMPHOCYTES # BLD AUTO: 5 % (ref 14–44)
MCH RBC QN AUTO: 27.2 PG (ref 26.8–34.3)
MCHC RBC AUTO-ENTMCNC: 31.3 G/DL (ref 31.4–37.4)
MCV RBC AUTO: 87 FL (ref 82–98)
MONOCYTES # BLD AUTO: 0.62 THOUSAND/UL (ref 0–1.22)
MONOCYTES NFR BLD: 2 % (ref 4–12)
NEUTROPHILS # BLD MANUAL: 28.93 THOUSAND/UL (ref 1.85–7.62)
NEUTS BAND NFR BLD MANUAL: 2 % (ref 0–8)
NEUTS SEG NFR BLD AUTO: 91 % (ref 43–75)
NITRITE UR QL STRIP: NEGATIVE
NON-SQ EPI CELLS URNS QL MICRO: ABNORMAL /HPF
PH UR STRIP.AUTO: 5.5 [PH]
PLATELET # BLD AUTO: 520 THOUSANDS/UL (ref 149–390)
PLATELET BLD QL SMEAR: ABNORMAL
PMV BLD AUTO: 9.4 FL (ref 8.9–12.7)
PROCALCITONIN SERPL-MCNC: 3.29 NG/ML
PROT UR STRIP-MCNC: ABNORMAL MG/DL
RBC # BLD AUTO: 3.12 MILLION/UL (ref 3.88–5.62)
RBC #/AREA URNS AUTO: ABNORMAL /HPF
RBC MORPH BLD: NORMAL
SP GR UR STRIP.AUTO: 1.02 (ref 1–1.03)
UROBILINOGEN UR STRIP-ACNC: <2 MG/DL
WBC # BLD AUTO: 31.11 THOUSAND/UL (ref 4.31–10.16)
WBC #/AREA URNS AUTO: ABNORMAL /HPF

## 2023-04-19 RX ORDER — VANCOMYCIN HYDROCHLORIDE 1 G/200ML
15 INJECTION, SOLUTION INTRAVENOUS EVERY 12 HOURS
Status: DISCONTINUED | OUTPATIENT
Start: 2023-04-19 | End: 2023-04-22

## 2023-04-19 RX ORDER — INSULIN LISPRO 100 [IU]/ML
1-6 INJECTION, SOLUTION INTRAVENOUS; SUBCUTANEOUS
Status: DISCONTINUED | OUTPATIENT
Start: 2023-04-19 | End: 2023-04-22 | Stop reason: HOSPADM

## 2023-04-19 RX ADMIN — VANCOMYCIN HYDROCHLORIDE 1000 MG: 1 INJECTION, SOLUTION INTRAVENOUS at 18:33

## 2023-04-19 RX ADMIN — HYDROMORPHONE HYDROCHLORIDE 4 MG: 2 TABLET ORAL at 18:38

## 2023-04-19 RX ADMIN — LORATADINE 10 MG: 10 TABLET ORAL at 08:01

## 2023-04-19 RX ADMIN — METHOCARBAMOL 750 MG: 500 TABLET ORAL at 12:43

## 2023-04-19 RX ADMIN — HEPARIN SODIUM 5000 UNITS: 5000 INJECTION INTRAVENOUS; SUBCUTANEOUS at 00:31

## 2023-04-19 RX ADMIN — HYDROMORPHONE HYDROCHLORIDE 4 MG: 2 TABLET ORAL at 10:10

## 2023-04-19 RX ADMIN — METHOCARBAMOL 750 MG: 500 TABLET ORAL at 23:50

## 2023-04-19 RX ADMIN — SENNOSIDES 8.6 MG: 8.6 TABLET, FILM COATED ORAL at 12:39

## 2023-04-19 RX ADMIN — MIRTAZAPINE 30 MG: 15 TABLET, FILM COATED ORAL at 22:12

## 2023-04-19 RX ADMIN — INSULIN LISPRO 1 UNITS: 100 INJECTION, SOLUTION INTRAVENOUS; SUBCUTANEOUS at 22:13

## 2023-04-19 RX ADMIN — HEPARIN SODIUM 5000 UNITS: 5000 INJECTION INTRAVENOUS; SUBCUTANEOUS at 17:01

## 2023-04-19 RX ADMIN — INSULIN LISPRO 1 UNITS: 100 INJECTION, SOLUTION INTRAVENOUS; SUBCUTANEOUS at 12:40

## 2023-04-19 RX ADMIN — GABAPENTIN 300 MG: 300 CAPSULE ORAL at 15:49

## 2023-04-19 RX ADMIN — CLONAZEPAM 1 MG: 1 TABLET ORAL at 17:00

## 2023-04-19 RX ADMIN — ACETAMINOPHEN 325MG 975 MG: 325 TABLET ORAL at 08:01

## 2023-04-19 RX ADMIN — LIDOCAINE 2 PATCH: 50 PATCH TOPICAL at 08:08

## 2023-04-19 RX ADMIN — TAMSULOSIN HYDROCHLORIDE 0.4 MG: 0.4 CAPSULE ORAL at 15:49

## 2023-04-19 RX ADMIN — FOLIC ACID 2000 MCG: 1 TABLET ORAL at 08:04

## 2023-04-19 RX ADMIN — SODIUM CHLORIDE, SODIUM LACTATE, POTASSIUM CHLORIDE, AND CALCIUM CHLORIDE 500 ML: .6; .31; .03; .02 INJECTION, SOLUTION INTRAVENOUS at 12:34

## 2023-04-19 RX ADMIN — FLUTICASONE PROPIONATE 1 SPRAY: 50 SPRAY, METERED NASAL at 08:08

## 2023-04-19 RX ADMIN — POLYETHYLENE GLYCOL 3350 17 G: 17 POWDER, FOR SOLUTION ORAL at 08:08

## 2023-04-19 RX ADMIN — PIPERACILLIN AND TAZOBACTAM 3.38 G: 3; .375 INJECTION, POWDER, LYOPHILIZED, FOR SOLUTION INTRAVENOUS at 22:12

## 2023-04-19 RX ADMIN — GABAPENTIN 300 MG: 300 CAPSULE ORAL at 22:12

## 2023-04-19 RX ADMIN — HEPARIN SODIUM 5000 UNITS: 5000 INJECTION INTRAVENOUS; SUBCUTANEOUS at 08:08

## 2023-04-19 RX ADMIN — INSULIN LISPRO 3 UNITS: 100 INJECTION, SOLUTION INTRAVENOUS; SUBCUTANEOUS at 07:59

## 2023-04-19 RX ADMIN — Medication 5000 UNITS: at 08:07

## 2023-04-19 RX ADMIN — PIPERACILLIN AND TAZOBACTAM 3.38 G: 3; .375 INJECTION, POWDER, LYOPHILIZED, FOR SOLUTION INTRAVENOUS at 16:56

## 2023-04-19 RX ADMIN — ACETAMINOPHEN 325MG 975 MG: 325 TABLET ORAL at 00:31

## 2023-04-19 RX ADMIN — CLONAZEPAM 1 MG: 1 TABLET ORAL at 08:04

## 2023-04-19 RX ADMIN — HYDROMORPHONE HYDROCHLORIDE 4 MG: 2 TABLET ORAL at 14:35

## 2023-04-19 RX ADMIN — IOHEXOL 100 ML: 350 INJECTION, SOLUTION INTRAVENOUS at 19:58

## 2023-04-19 RX ADMIN — EMPAGLIFLOZIN 10 MG: 10 TABLET, FILM COATED ORAL at 08:00

## 2023-04-19 RX ADMIN — GABAPENTIN 300 MG: 300 CAPSULE ORAL at 08:01

## 2023-04-19 RX ADMIN — INSULIN LISPRO 1 UNITS: 100 INJECTION, SOLUTION INTRAVENOUS; SUBCUTANEOUS at 17:06

## 2023-04-19 RX ADMIN — METHOCARBAMOL 750 MG: 500 TABLET ORAL at 05:18

## 2023-04-19 RX ADMIN — IOHEXOL 100 ML: 350 INJECTION, SOLUTION INTRAVENOUS at 16:47

## 2023-04-19 RX ADMIN — ACETAMINOPHEN 325MG 975 MG: 325 TABLET ORAL at 17:00

## 2023-04-19 RX ADMIN — METHOCARBAMOL 750 MG: 500 TABLET ORAL at 00:31

## 2023-04-19 RX ADMIN — SERTRALINE HYDROCHLORIDE 100 MG: 100 TABLET ORAL at 08:00

## 2023-04-19 RX ADMIN — METHOCARBAMOL 750 MG: 500 TABLET ORAL at 17:01

## 2023-04-19 RX ADMIN — SODIUM CHLORIDE, SODIUM LACTATE, POTASSIUM CHLORIDE, AND CALCIUM CHLORIDE 500 ML: .6; .31; .03; .02 INJECTION, SOLUTION INTRAVENOUS at 20:52

## 2023-04-19 RX ADMIN — DOCUSATE SODIUM 100 MG: 100 CAPSULE, LIQUID FILLED ORAL at 12:39

## 2023-04-19 NOTE — ASSESSMENT & PLAN NOTE
Lab Results   Component Value Date    HGBA1C 6 3 (H) 03/14/2023       Recent Labs     04/18/23 2027 04/19/23  0733 04/19/23  1127 04/19/23  1613   POCGLU 191* 250* 183* 178*       Blood Sugar Average: Last 72 hrs:  (P) 620 [id number]   Home regimen: Jardiance, metformin, glipizide  Inpatient regimen: Jardiance, sliding scale insulin  Diabetic diet

## 2023-04-19 NOTE — PLAN OF CARE
Problem: MOBILITY - ADULT  Goal: Maintain or return to baseline ADL function  Description: INTERVENTIONS:  -  Assess patient's ability to carry out ADLs; assess patient's baseline for ADL function and identify physical deficits which impact ability to perform ADLs (bathing, care of mouth/teeth, toileting, grooming, dressing, etc )  - Assess/evaluate cause of self-care deficits   - Assess range of motion  - Assess patient's mobility; develop plan if impaired  - Assess patient's need for assistive devices and provide as appropriate  - Encourage maximum independence but intervene and supervise when necessary  - Involve family in performance of ADLs  - Assess for home care needs following discharge   - Consider OT consult to assist with ADL evaluation and planning for discharge  - Provide patient education as appropriate  Outcome: Progressing     Problem: INFECTION - ADULT  Goal: Absence or prevention of progression during hospitalization  Description: INTERVENTIONS:  - Assess and monitor for signs and symptoms of infection  - Monitor lab/diagnostic results  - Monitor all insertion sites, i e  indwelling lines, tubes, and drains  - Monitor endotracheal if appropriate and nasal secretions for changes in amount and color  - [place] appropriate cooling/warming therapies per order  - Administer medications as ordered  - Instruct and encourage patient and family to use good hand hygiene technique  - Identify and instruct in appropriate isolation precautions for identified infection/condition  Outcome: Progressing     Problem: SAFETY ADULT  Goal: Maintain or return to baseline ADL function  Description: INTERVENTIONS:  -  Assess patient's ability to carry out ADLs; assess patient's baseline for ADL function and identify physical deficits which impact ability to perform ADLs (bathing, care of mouth/teeth, toileting, grooming, dressing, etc )  - Assess/evaluate cause of self-care deficits   - Assess range of motion  - Assess patient's mobility; develop plan if impaired  - Assess patient's need for assistive devices and provide as appropriate  - Encourage maximum independence but intervene and supervise when necessary  - Involve family in performance of ADLs  - Assess for home care needs following discharge   - Consider OT consult to assist with ADL evaluation and planning for discharge  - Provide patient education as appropriate  Outcome: Progressing

## 2023-04-19 NOTE — ASSESSMENT & PLAN NOTE
POD8 status post T12-S1 decompression and fusion  Pain well controlled now  Urinating adequately (recent Mendoza removal)  Moving BMs  PT/OT recommended STR    4/19 - pt developed signs of infection including elevated WBC, procal, and fevers  CT lumbar spine - Postsurgical changes seen, no clear signs of infx/abscess formation

## 2023-04-19 NOTE — ASSESSMENT & PLAN NOTE
Lab Results   Component Value Date    HGB 8 5 (L) 04/19/2023    HGB 8 8 (L) 04/17/2023    HGB 8 9 (L) 04/16/2023     Baseline hemoglobin 13  Currently hemoglobin around 8 5  Likely acute blood loss as she is s/p neurosurgical intervention  Stable, continue to trend while inpatient

## 2023-04-19 NOTE — QUICK NOTE
Patient states he still has low back pain associated with the incision, denies lower extremity numbness  He denies saddle anesthesia  States he has felt a little feverish, no chills  Otherwise states he feels well  White count elevated at 31 11  , temp 100 3    Source of infection unknown, soft tissues vs UTI due to repeated catheterization  Start on zosyn and vanco due to recent instrumentation  Blood cultures obtained  CXR performed today was within normal limits, no cardiopulmonary disease noted  Discuss with Dr Crow Steen, order placed for stat CT lumbar spine with and wo contrast  Will make patient npo until scan is done       Gillian Mendoza

## 2023-04-19 NOTE — PROGRESS NOTES
Asya Joseph is a 74 y o  male who is currently ordered Vancomycin IV with management by the Pharmacy Consult service  Relevant clinical data and objective / subjective history reviewed  Vancomycin Assessment:  Indication and Goal AUC/Trough: Soft tissue (goal -600, trough >10), -600, trough >10  Clinical Status: stable  Micro:   Blood culture x 2: pending  Renal Function:  SCr: 0 88 mg/dL  CrCl: 87 1 mL/min  Renal replacement: Not on dialysis  Days of Therapy: 1  Current Dose: 1000mg IV Q12H  Vancomycin Plan:  New Dosing:    Estimated AUC: 503 mcg*hr/mL  Estimated Trough: 16 1 mcg/mL  Next Level: With morning labs at approximately 0500 on 4/21/23  Renal Function Monitoring: Daily BMP and [place] will continue to follow closely for s/sx of nephrotoxicity, infusion reactions and appropriateness of therapy  BMP and CBC will be ordered per protocol  We will continue to follow the patient’s culture results and clinical progress daily      Lucia Rabago, Pharmacist

## 2023-04-19 NOTE — SEPSIS NOTE
Sepsis Note   Letty Bowers 74 y o  male MRN: [id number]  Unit/Bed#: -01 Encounter: [id number]       Initial Sepsis Screening     Row Name 04/19/23 1947                Is the patient's history suggestive of a new or worsening infection? Yes (Proceed)  -CV        Suspected source of infection suspect infection, source unknown  -CV        Indicate SIRS criteria Leukocytosis (WBC > 37986 IJL) OR Leukopenia (WBC <4000 IJL) OR Bandemia (WBC >10% bands); Tachycardia > 90 bpm  -CV        Are two or more of the above signs & symptoms of infection both present and new to the patient? Yes (Proceed)  -CV        Assess for evidence of organ dysfunction: Are any of the below criteria present within 6 hours of suspected infection and SIRS criteria that are NOT considered to be chronic conditions? --  pending labs  -CV              User Key  (r) = Recorded By, (t) = Taken By, (c) = Cosigned By    [address] Name Provider Type    CV Litzy Watson PA-C Physician Assistant                    Body mass index is 27 92 kg/m²    Wt Readings from Last 1 Encounters:   04/19/23 83 3 kg (183 lb 10 3 oz)        Ideal body weight: 68 4 kg (150 lb 12 7 oz)  Adjusted ideal body weight: 74 4 kg (163 lb 14 9 oz)

## 2023-04-19 NOTE — PROGRESS NOTES
"Progress Note - Era Den 74 y o  male MRN: [id number]    Unit/Bed#: MS 318Wan Encounter: [id number]      Assessment:    POD 8 s/p T12- S1 Lumbar decompression and fusion  -pain controlled well this am  -PT/OT  -OOB as tolerated  -needs to go to rehab-awaiting placement  - Seen and examined with Dr Tamar Taylor, pt states he feels well and may be ok to go to rehab today  Awaiting placement this am, agus    Urinary retention  -ball in for three days-voiding on his own  PVRs    Tachycardia  -denies cp, sob  -cta neg  -suspect some anxiety  -low grade temp  Incision are fine, no leakage  Suspect possibly some atelectasis  -cont to monitor  -repeat vitals  -not on oxygen, will ambulate and check vitals    ABLA  -most likely from surgery  -Hgb stable,cont to monitor  -tachycardia, off NC, denies dizziness and cp  -CTA neg  -trend labs    DM2, Depression  -SSI,Home meds      Subjective: -  Doing well  States back pain better with heating pad      Objective:     Vitals: Blood pressure 102/66, pulse 93, temperature 100 2 °F (37 9 °C), resp  rate 22, height 5' 8\" (1 727 m), weight 83 3 kg (183 lb 10 3 oz), SpO2 93 %  ,Body mass index is 27 92 kg/m²        Intake/Output Summary (Last 24 hours) at 4/19/2023 0755  Last data filed at 4/19/2023 0518  Gross per 24 hour   Intake --   Output 1350 ml   Net -1350 ml       Physical Exam:   General appearance: alert and oriented, in no acute distress  Head: Normocephalic, without obvious abnormality, atraumatic, sclerae anicteric, mucous membranes moist  Neck: no JVD and supple, symmetrical, trachea midline  Lungs: clear to auscultation, no wheezes or rales  Heart:   Mild tachycardia, regular rhythm, S1-S2 normal, no murmur  Abdomen:  non distended, soft, no guarding, no rebound, active bowel sounds  Extremities:   No edema, redness or tenderness in the calves or thighs  Strength is 5/5  Incision c/d/i with dressing in place  Skin: Warm, dry  Nursing notes and vital signs " reviewed      Invasive Devices     Peripheral Intravenous Line  Duration           Peripheral IV 04/15/23 Right Forearm 4 days    Peripheral IV 04/17/23 Left Antecubital 1 day                Lab, Imaging and other studies: I have personally reviewed pertinent reports      VTE Pharmacologic Prophylaxis: Heparin  VTE Mechanical Prophylaxis: sequential compression device

## 2023-04-19 NOTE — QUICK NOTE
Pt re-examined  Low grade temp, tachycardia, soft BP  Pt denies any complaints, states he does have urinary urgency  No back pain, cp, sob, drainage from incisions  Will order CXR, UA, blood cx, labs  Repeat labs in the am  500 bolus  Cont to ambulate, pt keeps refusing to ambulate  No abx for now  Cont to control BS tightly  Covid neg   Hold discharge for now  Discussed with Dr Bib Trujillo  Discussed with the patient       Katja Oshea

## 2023-04-19 NOTE — ASSESSMENT & PLAN NOTE
SIRS: Tachycardia, tachypnea, leukocytosis, fever 100 3 (despite taking tylenol 975mg q8h)  Source: unclear  Suspect abdominal vs urinary (Recent catheterization, also on jardiance)  WBC 31k  Procalcitonin 3 2  Lactic pending  UA w/ moderate bacteria, 4-10 WBCs, not reflexed to culture    Blood cultures drawn x 2  Started on vanco/zosyn  CTA chest today w/o any acute signs of infx  CT lumbar spine w/o any acute infx or abscess  Will obtain CT abdomen/pelvis  Continue vanco/zosyn, follow blood cultures

## 2023-04-20 ENCOUNTER — APPOINTMENT (INPATIENT)
Dept: NON INVASIVE DIAGNOSTICS | Facility: HOSPITAL | Age: 68
End: 2023-04-20

## 2023-04-20 PROBLEM — K80.20 GALLSTONES: Status: ACTIVE | Noted: 2023-04-20

## 2023-04-20 PROBLEM — E87.1 HYPONATREMIA: Status: ACTIVE | Noted: 2023-04-20

## 2023-04-20 LAB
ALBUMIN SERPL BCP-MCNC: 3.3 G/DL (ref 3.5–5)
ALP SERPL-CCNC: 71 U/L (ref 34–104)
ALT SERPL W P-5'-P-CCNC: 12 U/L (ref 7–52)
ANION GAP SERPL CALCULATED.3IONS-SCNC: 11 MMOL/L (ref 4–13)
ANISOCYTOSIS BLD QL SMEAR: PRESENT
AST SERPL W P-5'-P-CCNC: 13 U/L (ref 13–39)
BASO STIPL BLD QL SMEAR: PRESENT
BASOPHILS # BLD MANUAL: 0 THOUSAND/UL (ref 0–0.1)
BASOPHILS NFR MAR MANUAL: 0 % (ref 0–1)
BILIRUB SERPL-MCNC: 0.5 MG/DL (ref 0.2–1)
BUN SERPL-MCNC: 12 MG/DL (ref 5–25)
CALCIUM ALBUM COR SERPL-MCNC: 9.3 MG/DL (ref 8.3–10.1)
CALCIUM SERPL-MCNC: 8.7 MG/DL (ref 8.4–10.2)
CHLORIDE SERPL-SCNC: 93 MMOL/L (ref 96–108)
CO2 SERPL-SCNC: 26 MMOL/L (ref 21–32)
CREAT SERPL-MCNC: 1 MG/DL (ref 0.6–1.3)
CREAT UR-MCNC: 59.7 MG/DL
EOSINOPHIL # BLD MANUAL: 0 THOUSAND/UL (ref 0–0.4)
EOSINOPHIL NFR BLD MANUAL: 0 % (ref 0–6)
ERYTHROCYTE [DISTWIDTH] IN BLOOD BY AUTOMATED COUNT: 16.3 % (ref 11.6–15.1)
ERYTHROCYTE [SEDIMENTATION RATE] IN BLOOD: 61 MM/HOUR (ref 0–19)
FERRITIN SERPL-MCNC: 66 NG/ML (ref 8–388)
GFR SERPL CREATININE-BSD FRML MDRD: 78 ML/MIN/1.73SQ M
GLUCOSE SERPL-MCNC: 175 MG/DL (ref 65–140)
GLUCOSE SERPL-MCNC: 189 MG/DL (ref 65–140)
GLUCOSE SERPL-MCNC: 192 MG/DL (ref 65–140)
GLUCOSE SERPL-MCNC: 202 MG/DL (ref 65–140)
GLUCOSE SERPL-MCNC: 299 MG/DL (ref 65–140)
HCT VFR BLD AUTO: 25.4 % (ref 36.5–49.3)
HGB BLD-MCNC: 8.2 G/DL (ref 12–17)
HYPERCHROMIA BLD QL SMEAR: PRESENT
IRON SATN MFR SERPL: 6 % (ref 20–50)
IRON SERPL-MCNC: 11 UG/DL (ref 65–175)
LYMPHOCYTES # BLD AUTO: 1.94 THOUSAND/UL (ref 0.6–4.47)
LYMPHOCYTES # BLD AUTO: 7 % (ref 14–44)
MAGNESIUM SERPL-MCNC: 1.6 MG/DL (ref 1.9–2.7)
MCH RBC QN AUTO: 27.8 PG (ref 26.8–34.3)
MCHC RBC AUTO-ENTMCNC: 32.3 G/DL (ref 31.4–37.4)
MCV RBC AUTO: 86 FL (ref 82–98)
MONOCYTES # BLD AUTO: 1.11 THOUSAND/UL (ref 0–1.22)
MONOCYTES NFR BLD: 4 % (ref 4–12)
NEUTROPHILS # BLD MANUAL: 24.69 THOUSAND/UL (ref 1.85–7.62)
NEUTS BAND NFR BLD MANUAL: 3 % (ref 0–8)
NEUTS SEG NFR BLD AUTO: 86 % (ref 43–75)
OSMOLALITY UR/SERPL-RTO: 282 MMOL/KG (ref 282–298)
PLATELET # BLD AUTO: 490 THOUSANDS/UL (ref 149–390)
PLATELET BLD QL SMEAR: ABNORMAL
PMV BLD AUTO: 9.1 FL (ref 8.9–12.7)
POLYCHROMASIA BLD QL SMEAR: PRESENT
POTASSIUM SERPL-SCNC: 3.5 MMOL/L (ref 3.5–5.3)
PROT SERPL-MCNC: 6.5 G/DL (ref 6.4–8.4)
RBC # BLD AUTO: 2.95 MILLION/UL (ref 3.88–5.62)
SODIUM 24H UR-SCNC: 12 MOL/L
SODIUM SERPL-SCNC: 130 MMOL/L (ref 135–147)
TIBC SERPL-MCNC: 188 UG/DL (ref 250–450)
TSH SERPL DL<=0.05 MIU/L-ACNC: 2.04 UIU/ML (ref 0.45–4.5)
WBC # BLD AUTO: 27.74 THOUSAND/UL (ref 4.31–10.16)

## 2023-04-20 RX ORDER — SODIUM CHLORIDE 9 MG/ML
75 INJECTION, SOLUTION INTRAVENOUS CONTINUOUS
Status: DISPENSED | OUTPATIENT
Start: 2023-04-20 | End: 2023-04-20

## 2023-04-20 RX ORDER — POTASSIUM CHLORIDE 20 MEQ/1
40 TABLET, EXTENDED RELEASE ORAL ONCE
Status: COMPLETED | OUTPATIENT
Start: 2023-04-20 | End: 2023-04-20

## 2023-04-20 RX ADMIN — HYDROCHLOROTHIAZIDE 25 MG: 25 TABLET ORAL at 09:30

## 2023-04-20 RX ADMIN — PIPERACILLIN AND TAZOBACTAM 3.38 G: 3; .375 INJECTION, POWDER, LYOPHILIZED, FOR SOLUTION INTRAVENOUS at 23:05

## 2023-04-20 RX ADMIN — HYDROMORPHONE HYDROCHLORIDE 4 MG: 2 TABLET ORAL at 19:59

## 2023-04-20 RX ADMIN — PIPERACILLIN AND TAZOBACTAM 3.38 G: 3; .375 INJECTION, POWDER, LYOPHILIZED, FOR SOLUTION INTRAVENOUS at 09:42

## 2023-04-20 RX ADMIN — HYDROMORPHONE HYDROCHLORIDE 4 MG: 2 TABLET ORAL at 14:24

## 2023-04-20 RX ADMIN — PIPERACILLIN AND TAZOBACTAM 3.38 G: 3; .375 INJECTION, POWDER, LYOPHILIZED, FOR SOLUTION INTRAVENOUS at 16:50

## 2023-04-20 RX ADMIN — PIPERACILLIN AND TAZOBACTAM 3.38 G: 3; .375 INJECTION, POWDER, LYOPHILIZED, FOR SOLUTION INTRAVENOUS at 03:38

## 2023-04-20 RX ADMIN — SENNOSIDES 8.6 MG: 8.6 TABLET, FILM COATED ORAL at 09:30

## 2023-04-20 RX ADMIN — MIRTAZAPINE 30 MG: 15 TABLET, FILM COATED ORAL at 21:11

## 2023-04-20 RX ADMIN — CLONAZEPAM 1 MG: 1 TABLET ORAL at 09:30

## 2023-04-20 RX ADMIN — HYDROMORPHONE HYDROCHLORIDE 4 MG: 2 TABLET ORAL at 09:28

## 2023-04-20 RX ADMIN — METHOCARBAMOL 750 MG: 500 TABLET ORAL at 05:10

## 2023-04-20 RX ADMIN — EMPAGLIFLOZIN 10 MG: 10 TABLET, FILM COATED ORAL at 09:30

## 2023-04-20 RX ADMIN — HEPARIN SODIUM 5000 UNITS: 5000 INJECTION INTRAVENOUS; SUBCUTANEOUS at 16:57

## 2023-04-20 RX ADMIN — SODIUM CHLORIDE, SODIUM LACTATE, POTASSIUM CHLORIDE, AND CALCIUM CHLORIDE 500 ML: .6; .31; .03; .02 INJECTION, SOLUTION INTRAVENOUS at 16:49

## 2023-04-20 RX ADMIN — Medication 5000 UNITS: at 09:28

## 2023-04-20 RX ADMIN — HEPARIN SODIUM 5000 UNITS: 5000 INJECTION INTRAVENOUS; SUBCUTANEOUS at 09:32

## 2023-04-20 RX ADMIN — FLUTICASONE PROPIONATE 1 SPRAY: 50 SPRAY, METERED NASAL at 09:32

## 2023-04-20 RX ADMIN — FOLIC ACID 2000 MCG: 1 TABLET ORAL at 09:28

## 2023-04-20 RX ADMIN — CLONAZEPAM 1 MG: 1 TABLET ORAL at 17:01

## 2023-04-20 RX ADMIN — INSULIN LISPRO 2 UNITS: 100 INJECTION, SOLUTION INTRAVENOUS; SUBCUTANEOUS at 16:59

## 2023-04-20 RX ADMIN — DOCUSATE SODIUM 100 MG: 100 CAPSULE, LIQUID FILLED ORAL at 09:30

## 2023-04-20 RX ADMIN — METHOCARBAMOL 750 MG: 500 TABLET ORAL at 12:45

## 2023-04-20 RX ADMIN — GABAPENTIN 300 MG: 300 CAPSULE ORAL at 09:31

## 2023-04-20 RX ADMIN — INSULIN LISPRO 1 UNITS: 100 INJECTION, SOLUTION INTRAVENOUS; SUBCUTANEOUS at 09:32

## 2023-04-20 RX ADMIN — VANCOMYCIN HYDROCHLORIDE 1000 MG: 1 INJECTION, SOLUTION INTRAVENOUS at 05:11

## 2023-04-20 RX ADMIN — SERTRALINE HYDROCHLORIDE 100 MG: 100 TABLET ORAL at 09:30

## 2023-04-20 RX ADMIN — HYDROMORPHONE HYDROCHLORIDE 4 MG: 2 TABLET ORAL at 03:51

## 2023-04-20 RX ADMIN — POLYETHYLENE GLYCOL 3350 17 G: 17 POWDER, FOR SOLUTION ORAL at 09:30

## 2023-04-20 RX ADMIN — ACETAMINOPHEN 325MG 975 MG: 325 TABLET ORAL at 09:30

## 2023-04-20 RX ADMIN — INSULIN LISPRO 1 UNITS: 100 INJECTION, SOLUTION INTRAVENOUS; SUBCUTANEOUS at 12:46

## 2023-04-20 RX ADMIN — NIFEDIPINE 120 MG: 30 TABLET, FILM COATED, EXTENDED RELEASE ORAL at 09:29

## 2023-04-20 RX ADMIN — ACETAMINOPHEN 325MG 975 MG: 325 TABLET ORAL at 16:56

## 2023-04-20 RX ADMIN — POTASSIUM CHLORIDE 40 MEQ: 1500 TABLET, EXTENDED RELEASE ORAL at 09:43

## 2023-04-20 RX ADMIN — POLYETHYLENE GLYCOL 3350 17 G: 17 POWDER, FOR SOLUTION ORAL at 17:00

## 2023-04-20 RX ADMIN — GABAPENTIN 300 MG: 300 CAPSULE ORAL at 16:56

## 2023-04-20 RX ADMIN — LOSARTAN POTASSIUM 100 MG: 50 TABLET, FILM COATED ORAL at 09:30

## 2023-04-20 RX ADMIN — METOPROLOL SUCCINATE 75 MG: 50 TABLET, EXTENDED RELEASE ORAL at 09:30

## 2023-04-20 RX ADMIN — GABAPENTIN 300 MG: 300 CAPSULE ORAL at 21:11

## 2023-04-20 RX ADMIN — METHOCARBAMOL 750 MG: 500 TABLET ORAL at 17:00

## 2023-04-20 RX ADMIN — TAMSULOSIN HYDROCHLORIDE 0.4 MG: 0.4 CAPSULE ORAL at 16:56

## 2023-04-20 RX ADMIN — INSULIN LISPRO 4 UNITS: 100 INJECTION, SOLUTION INTRAVENOUS; SUBCUTANEOUS at 23:07

## 2023-04-20 RX ADMIN — LIDOCAINE 2 PATCH: 50 PATCH TOPICAL at 09:31

## 2023-04-20 RX ADMIN — VANCOMYCIN HYDROCHLORIDE 1000 MG: 1 INJECTION, SOLUTION INTRAVENOUS at 18:05

## 2023-04-20 RX ADMIN — LORATADINE 10 MG: 10 TABLET ORAL at 09:30

## 2023-04-20 NOTE — ASSESSMENT & PLAN NOTE
Lab Results   Component Value Date    HGBA1C 6 3 (H) 03/14/2023       Recent Labs     04/19/23  1613 04/19/23  2105 04/20/23  0728 04/20/23  1052   POCGLU 178* 175* 189* 192*       Blood Sugar Average: Last 72 hrs:  (P) 188 [id number]   Home regimen: Jardiance, metformin, glipizide  Inpatient regimen: Jardiance, sliding scale insulin  Diabetic diet

## 2023-04-20 NOTE — PROGRESS NOTES
Ric Hale is a 74 y o  male who is currently ordered Vancomycin IV with management by the Pharmacy Consult service  Relevant clinical data and objective / subjective history reviewed  Vancomycin Assessment:  Indication and Goal AUC/Trough: Soft tissue (goal -600, trough >10), -600, trough >10  Clinical Status: stable  Micro:     Renal Function:  SCr: 1 mg/dL  CrCl: 70 5 mL/min  Renal replacement: Not on dialysis  Days of Therapy: 2  Current Dose: 1000 mg q12h  Vancomycin Plan:  Estimated AUC: 573 mcg*hr/mL  Estimated Trough: 18 8 mcg/mL  Next Level: 0500 on 04/21/23  Renal Function Monitoring: Daily BMP and [place] will continue to follow closely for s/sx of nephrotoxicity, infusion reactions and appropriateness of therapy  BMP and CBC will be ordered per protocol  We will continue to follow the patient’s culture results and clinical progress daily      Nikolai Torres, Pharmacist

## 2023-04-20 NOTE — PLAN OF CARE
Problem: GENITOURINARY - ADULT  Goal: Urinary catheter remains patent  Description: INTERVENTIONS:  - Assess patency of urinary catheter  - If patient has a chronic ball, consider changing catheter if non-functioning  - Follow guidelines for intermittent irrigation of non-functioning urinary catheter  Outcome: Progressing

## 2023-04-20 NOTE — ASSESSMENT & PLAN NOTE
Sodium was low at 130 this morning  Serum osmolarity and urine studies pending  Consider giving IV cautiously

## 2023-04-20 NOTE — CONSULTS
[place]  Consult  Name: Kimber George 74 y o  male I MRN: [id number]  Unit/Bed#: -01 I Date of Admission: 4/11/2023   Date of Service: 4/19/2023 I Hospital Day: 8    Inpatient consult to Internal Medicine  Consult performed by: Nazia Rice PA-C  Consult ordered by: Leon March PA-C          Assessment/Plan   * Sepsis [facility])  Assessment & Plan  SIRS: Tachycardia, tachypnea, leukocytosis, fever 100 3 (despite taking tylenol 975mg q8h)  Source: unclear  Suspect abdominal vs urinary (Recent catheterization, also on jardiance)  WBC 31k  Procalcitonin 3 2  Lactic pending  UA w/ moderate bacteria, 4-10 WBCs, not reflexed to culture    Blood cultures drawn x 2  Started on vanco/zosyn  CTA chest today w/o any acute signs of infx  CT lumbar spine w/o any acute infx or abscess  Will obtain CT abdomen/pelvis  Continue vanco/zosyn, follow blood cultures       Spinal stenosis of lumbar region with neurogenic claudication  Assessment & Plan  POD8 status post T12-S1 decompression and fusion  Pain well controlled now  Urinating adequately (recent Mendoza removal)  Moving BMs  PT/OT recommended STR    4/19 - pt developed signs of infection including elevated WBC, procal, and fevers  CT lumbar spine - Postsurgical changes seen, no clear signs of infx/abscess formation    Anemia  Assessment & Plan  Lab Results   Component Value Date    HGB 8 5 (L) 04/19/2023    HGB 8 8 (L) 04/17/2023    HGB 8 9 (L) 04/16/2023     Baseline hemoglobin 13  Currently hemoglobin around 8 5  Likely acute blood loss as she is s/p neurosurgical intervention  Stable, continue to trend while inpatient      DM (diabetes mellitus) ([facility] [address] )  Assessment & Plan  Lab Results   Component Value Date    HGBA1C 6 3 (H) 03/14/2023       Recent Labs     04/18/23 2027 04/19/23  0733 04/19/23  1127 04/19/23  1613   POCGLU 191* 250* 183* 178*       Blood Sugar Average: Last 72 hrs:  (P) 956 [id number]   Home regimen: Jardiance, metformin, glipizide  Inpatient regimen: Jardiance, sliding scale insulin  Diabetic diet    Cirrhosis, alcoholic ([facility] [address] )  Assessment & Plan  Known alcoholic cirrhosis  LFTs earlier in the week were WNL  No ascites on exam, not jaundiced    Benzodiazepine dependence (HCC)  Assessment & Plan  On Klonopin 1mg BID, continue           VTE Prophylaxis: VTE Score: 3 Moderate Risk (Score 3-4) - Pharmacological DVT Prophylaxis Ordered: heparin  Total Time Spent on Date of Encounter in care of patient: 55 minutes This time was spent on one or more of the following: performing physical exam; counseling and coordination of care; obtaining or reviewing history; documenting in the medical record; reviewing/ordering tests, medications or procedures; communicating with other healthcare professionals and discussing with patient's family/caregivers  Collaboration of Care: Were Recommendations Directly Discussed with Primary Treatment Team? Yes    History of Present Illness:  Lizzy Staples is a 74 y o  male with PMH alcoholic cirrhosis, diabetes, history of thyroid cancer who is originally admitted to the neurosurgical service for scheduled outpatient L1-S1 decompression and fusion  Patient was subsequently admitted to the hospital for pain control (temporarily requiring ketamine gtt), anemia (3point drop in hemoglobin ), and urinary retention (requiring straight cath x4 and eventual Mendoza catheter which has now been removed )  We are consulted due to patient developing sepsis markers while on the floors  Patient is currently POD8 from his neuro surgical intervention  He has been receiving scheduled Tylenol around-the-clock with 975 mg every 8 hours, and was noted to spike a temperature of 100 3 today despite scheduled Tylenol  Labs were checked which revealed leukocytosis with WBC 31,000 and elevated procalcitonin at 3 2  Patient is tachycardic and meeting sepsis criteria    He was started on IV antibiotics empirically with Zosyn/vancomycin due to recent instrumentation  A UA was checked which showed 4-10 WBCs and moderate bacteria, equivocal for UTI  Additionally, he had a CT imaging of his lumbar spine which did not show any signs of infection  Review of Systems:  Review of Systems   Constitutional: Positive for chills  Negative for appetite change, fatigue and fever  HENT: Negative for ear pain, sore throat and trouble swallowing  Eyes: Negative for visual disturbance  Respiratory: Negative for cough, chest tightness, shortness of breath and wheezing  Cardiovascular: Negative for chest pain, palpitations and leg swelling  Gastrointestinal: Negative for abdominal distention, abdominal pain, diarrhea, nausea and vomiting  Endocrine: Negative  Genitourinary: Negative for dysuria, flank pain and hematuria  Musculoskeletal: Negative for arthralgias, gait problem and myalgias  R hip pain   Skin: Negative for pallor  Allergic/Immunologic: Negative for immunocompromised state  Neurological: Negative for dizziness, syncope, light-headedness, numbness and headaches         Past Medical and Surgical History:   Past Medical History:   Diagnosis Date   • Anxiety    • Arthritis    • Cancer ([facility] )    • Depression    • Diabetes mellitus ([facility] )    • Hypertension    • Liver disease    • Mitral valve prolapse    • PONV (postoperative nausea and vomiting)    • Thyroid disease        Past Surgical History:   Procedure Laterality Date   • INCISION AND DRAINAGE OF WOUND Left 8/12/2022    Procedure: INCISION AND DRAINAGE (I&D) EXTREMITY;  Surgeon: Leonie Chapa DPM;  Location:  MAIN OR;  Service: Podiatry   • JOINT REPLACEMENT Left 03/11/2022    Left TSA   • UT ARTHRODESIS POSTERIOR/PSTLAT TQ 1NTRSPC LUMBAR Bilateral 4/11/2023    Procedure: L1-S1 navigated posterior decompression with instrumented fixation fusion;  Surgeon: Juani Hilario MD;  Location:  MAIN OR;  Service: Neurosurgery   • THYROID SURGERY  2021 "remove cancer       Meds/Allergies:  all medications and allergies reviewed    Allergies: Allergies   Allergen Reactions   • Abilify [Aripiprazole] Tremor     Shaking     • Cephalexin Rash   • Molds & Smuts Allergic Rhinitis   • Pregabalin Tremor     Lyrica - shaking feeling       Social History:  Marital Status: /Civil Union  Substance Use History:   Social History     Substance and Sexual Activity   Alcohol Use Yes    Comment: Socially     Social History     Tobacco Use   Smoking Status Former   • Packs/day: 0 25   • Types: Cigarettes   • Quit date:    • Years since quittin 3   Smokeless Tobacco Never   Tobacco Comments    quit      Social History     Substance and Sexual Activity   Drug Use Yes   • Frequency: 7 0 times per week   • Types: Marijuana    Comment: Medical       Family History:  History reviewed  No pertinent family history  Physical Exam:   Vitals:   Blood Pressure: 116/54 (23)  Pulse: 105 (23 1501)  Temperature: 98 9 °F (37 2 °C) (23)  Temp Source: Oral (23 1501)  Respirations: 22 (23 1337)  Height: 5' 8\" (172 7 cm) (23 5196)  Weight - Scale: 83 3 kg (183 lb 10 3 oz) (23 0600)  SpO2: 100 % (23 1501)    Physical Exam  Vitals and nursing note reviewed  Constitutional:       Appearance: Normal appearance  HENT:      Head: Normocephalic and atraumatic  Mouth/Throat:      Mouth: Mucous membranes are moist       Pharynx: Oropharynx is clear  No oropharyngeal exudate  Comments: Poor dentition, but no signs of infection  Eyes:      Extraocular Movements: Extraocular movements intact  Cardiovascular:      Rate and Rhythm: Regular rhythm  Tachycardia present  Pulses: Normal pulses  Heart sounds: Normal heart sounds  No murmur heard  No friction rub  No gallop  Pulmonary:      Effort: Pulmonary effort is normal  No respiratory distress  Breath sounds: Normal breath sounds  No stridor   No " wheezing or rales  Abdominal:      General: Abdomen is flat  Bowel sounds are normal  There is no distension  Palpations: Abdomen is soft  Tenderness: There is no abdominal tenderness  Musculoskeletal:         General: Tenderness ( R hip, mild) present  Right lower leg: No edema  Left lower leg: No edema  Skin:     General: Skin is warm and dry  Neurological:      General: No focal deficit present  Mental Status: He is alert and oriented to person, place, and time  Additional Data:   Lab Results:    Results from last 7 days   Lab Units 04/19/23  1356 04/15/23  0459 04/14/23  0450   WBC Thousand/uL 31 11*   < > 13 60*   HEMOGLOBIN g/dL 8 5*   < > 8 4*   HEMATOCRIT % 27 2*   < > 26 9*   PLATELETS Thousands/uL 520*   < > 272   BANDS PCT % 2  --   --    NEUTROS PCT %  --   --  72   LYMPHS PCT %  --   --  18   LYMPHO PCT % 5*  --   --    MONOS PCT %  --   --  7   MONO PCT % 2*  --   --    EOS PCT % 0  --  1    < > = values in this interval not displayed       Results from last 7 days   Lab Units 04/17/23  0345   SODIUM mmol/L 132*   POTASSIUM mmol/L 3 8   CHLORIDE mmol/L 94*   CO2 mmol/L 25   BUN mg/dL 13   CREATININE mg/dL 0 88   ANION GAP mmol/L 13   CALCIUM mg/dL 9 4   GLUCOSE RANDOM mg/dL 144*             Lab Results   Component Value Date/Time    HGBA1C 6 3 (H) 03/14/2023 10:46 AM    HGBA1C 6 5 (H) 01/18/2023 08:02 AM    HGBA1C 6 5 (H) 08/10/2022 04:56 AM     Results from last 7 days   Lab Units 04/19/23  1613 04/19/23  1127 04/19/23  0733 04/18/23  2027 04/18/23  1626 04/18/23  1120 04/18/23  0726 04/17/23  2159 04/17/23  1627 04/17/23  1145 04/17/23  0720 04/16/23  2109   POC GLUCOSE mg/dl 178* 183* 250* 191* 203* 177* 175* 195* 140 224* 169* 115     Results from last 7 days   Lab Units 04/19/23  1703   PROCALCITONIN ng/ml 3 29*       Imaging: Reviewed radiology reports from this admission including: chest xray and CTA chest  CT spine lumbar w wo contrast   Final Result by Moira Lamas MD (04/19 1602)         1  Postsurgical change from posterior T12-S1 fusion and lumbar laminectomies  Extensive artifact from fusion hardware limits evaluation of the soft tissues  2   Decompression of the bony central canal and the lumbar spine  Multilevel moderate to severe neural foraminal narrowing  3   Epidural space not clearly visualized  Gas and inflammation in the posterior paraspinal soft tissues along the surgical bed  No evidence of organized fluid collection  Workstation performed: [id number]         XR chest 1 view   Final Result by Leticia Cooley MD (04/19 1244)      No acute cardiopulmonary disease  Workstation performed: [id number]         CTA chest pe study   Final Result by Kelsie Gilliam MD (04/17 1331)      1  No evidence of pulmonary embolism   2  No acute pulmonary disease   3  0 5 cm nodular density in the right posterior sulcus  Nonvisualized on prior study but may represent focus of scarring with surrounding vasculature  Given prior smoking history however, based on current Fleischner Society 2017 Guidelines on incidental    pulmonary nodule, optional follow-up CT at 12 months can be considered  4  Cholelithiasis   5  Coronary artery calcification                  Workstation performed: [id number]         XR lumbar spine 2 or 3 views   Final Result by Debbi Dennis DO (04/14 5795)   1  Status post posterior fusion spanning the T12-S1 levels  Hardware appears grossly intact  Workstation performed: [id number]         XR spine lumbar 2 or 3 views injury   Final Result by Bartolo Mclaughlin MD (04/11 7227)      Fluoroscopic guidance provided for surgical procedure  Please refer to the separate procedure notes for additional details         Workstation performed: [id number]         CT abdomen pelvis w contrast    (Results Pending)         ** Please Note: This note may have been constructed using a voice recognition system   **

## 2023-04-20 NOTE — ASSESSMENT & PLAN NOTE
CT abdomen pelvis done showed postoperative changes of the lumbar spine with stabilization rods  Of note, there is some endplate irregularity at L1-L2 more pronounced than the study of April with more similar to MRI in December given differences in modalities    Vacuum phenomenon is   still present  Neurosurgery on board

## 2023-04-20 NOTE — ASSESSMENT & PLAN NOTE
POD8 status post T12-S1 decompression and fusion  Pain well controlled now  Urinating adequately (recent Mendoza removal)  Moving BMs  PT/OT recommended STR    4/19 - pt developed signs of infection including elevated WBC, procal, and fevers  CT lumbar spine - Postsurgical changes seen, no clear signs of infx/abscess formation  CT abdomen pelvis done 4/19/2023 showed postoperative changes of the lumbar spine with stabilization rods  Of note, there is some endplate irregularity at L1-L2 more pronounced than the study of April with more similar to MRI in December given differences in modalities    Vacuum phenomenon is   still present  Neurosurgery on board

## 2023-04-20 NOTE — NURSING NOTE
"Patient refused to take tylenol, robaxin and heparin  Patient stated \" I don't want to take any hard pain meds\"  This RN explained to the patient that the medications that he was receiving at this time were not hard pain medications  Patient continued to refuse      "

## 2023-04-20 NOTE — ASSESSMENT & PLAN NOTE
SIRS: Tachycardia, tachypnea, leukocytosis, fever 100 3 (despite taking tylenol 975mg q8h)  Source: unclear  Suspect abdominal vs urinary (Recent catheterization, also on jardiance)  WBC 31k  Procalcitonin 3 2  Lactic pending  UA w/ moderate bacteria, 4-10 WBCs, not reflexed to culture    Blood cultures drawn x 2  Started on vanco/zosyn  CTA chest today w/o any acute signs of infx  CT lumbar spine w/o any acute infx or abscess  CT abdomen/pelvis showed gallstones with no inflammation and cystitis  Continue vanco/zosyn, follow blood cultures

## 2023-04-20 NOTE — ASSESSMENT & PLAN NOTE
Lab Results   Component Value Date    HGB 8 2 (L) 04/20/2023    HGB 8 5 (L) 04/19/2023    HGB 8 8 (L) 04/17/2023     Baseline hemoglobin 13  Currently hemoglobin around 8 5  Likely acute blood loss as she is s/p neurosurgical intervention  Stable, continue to trend while inpatient

## 2023-04-20 NOTE — PROGRESS NOTES
[place]  Progress Note  Name: Fauzia Tamayo  MRN: [id number]  Unit/Bed#: -01 I Date of Admission: 4/11/2023   Date of Service: 4/20/2023 I Hospital Day: 9    Assessment/Plan   Gallstones  Assessment & Plan  Gallstones without surrounding inflammatory changes noted on CT abdomen done as incidental findings    Hyponatremia  Assessment & Plan  Sodium was low at 130 this morning  Serum osmolarity and urine studies pending  Consider giving IV cautiously    Anemia  Assessment & Plan  Lab Results   Component Value Date    HGB 8 2 (L) 04/20/2023    HGB 8 5 (L) 04/19/2023    HGB 8 8 (L) 04/17/2023     Baseline hemoglobin 13  Currently hemoglobin around 8 5  Likely acute blood loss as she is s/p neurosurgical intervention  Stable, continue to trend while inpatient      Urinary retention  Assessment & Plan  Patient previously had a urinary Mendoza catheter in situ; now discontinued    PONV (postoperative nausea and vomiting)  Assessment & Plan  Antiemetics as needed    Spinal stenosis of lumbar region with neurogenic claudication  Assessment & Plan  POD8 status post T12-S1 decompression and fusion  Pain well controlled now  Urinating adequately (recent Mendoza removal)  Moving BMs  PT/OT recommended STR    4/19 - pt developed signs of infection including elevated WBC, procal, and fevers  CT lumbar spine - Postsurgical changes seen, no clear signs of infx/abscess formation  CT abdomen pelvis done 4/19/2023 showed postoperative changes of the lumbar spine with stabilization rods  Of note, there is some endplate irregularity at L1-L2 more pronounced than the study of April with more similar to MRI in December given differences in modalities  Vacuum phenomenon is   still present  Neurosurgery on board    Lumbar spondylosis  Assessment & Plan  CT abdomen pelvis done showed postoperative changes of the lumbar spine with stabilization rods    Of note, there is some endplate irregularity at L1-L2 more pronounced than the study of April with more similar to MRI in December given differences in modalities  Vacuum phenomenon is   still present  Neurosurgery on board    DM (diabetes mellitus) [facility])  Assessment & Plan  Lab Results   Component Value Date    HGBA1C 6 3 (H) 03/14/2023       Recent Labs     04/19/23  1613 04/19/23  2105 04/20/23  0728 04/20/23  1052   POCGLU 178* 175* 189* 192*       Blood Sugar Average: Last 72 hrs:  (P) 188 [id number]   Home regimen: Jardiance, metformin, glipizide  Inpatient regimen: Jardiance, sliding scale insulin  Diabetic diet    Cirrhosis, alcoholic (HCC)  Assessment & Plan  Known alcoholic cirrhosis  LFTs earlier in the week were WNL  No ascites on exam, not jaundiced    Benzodiazepine dependence (HCC)  Assessment & Plan  On Klonopin 1mg BID, continue    * Sepsis ([facility] [address] )  Assessment & Plan  SIRS: Tachycardia, tachypnea, leukocytosis, fever 100 3 (despite taking tylenol 975mg q8h)  Source: unclear  Suspect abdominal vs urinary (Recent catheterization, also on jardiance)  WBC 31k  Procalcitonin 3 2  Lactic pending  UA w/ moderate bacteria, 4-10 WBCs, not reflexed to culture  Blood cultures drawn x 2  Started on vanco/zosyn  CTA chest today w/o any acute signs of infx  CT lumbar spine w/o any acute infx or abscess  CT abdomen/pelvis showed gallstones with no inflammation and cystitis  Continue vanco/zosyn, follow blood cultures                VTE Pharmacologic Prophylaxis: VTE Score: 3 Moderate Risk (Score 3-4) - Pharmacological DVT Prophylaxis Ordered: enoxaparin (Lovenox)        Total Time Spent on Date of Encounter in care of patient: 45 minutes This time was spent on one or more of the following: performing physical exam; counseling and coordination of care; obtaining or reviewing history; documenting in the medical record; reviewing/ordering tests, medications or procedures; communicating with other healthcare professionals and discussing with patient's family/caregivers  Current Length of Stay: 9 day(s)  Current Patient Status: Inpatient   Certification Statement: The patient will continue to require additional inpatient hospital stay due to Fever in a patient with spinal stenosis status post fusion  Discharge Plan: Anticipate discharge in 48 hrs to rehab facility  Code Status: Level 1 - Full Code    Subjective:   No fresh complaint    Objective:   Patient looks well clinically    Vitals:   Temp (24hrs), Av 2 °F (37 3 °C), Min:98 6 °F (37 °C), Max:99 9 °F (37 7 °C)    Temp:  [98 6 °F (37 °C)-99 9 °F (37 7 °C)] 99 2 °F (37 3 °C)  HR:  [] 103  Resp:  [16] 16  BP: (116-153)/(54-79) 142/79  SpO2:  [96 %-98 %] 96 %  Body mass index is 27 49 kg/m²  Input and Output Summary (last 24 hours): Intake/Output Summary (Last 24 hours) at 2023 1524  Last data filed at 2023 1421  Gross per 24 hour   Intake --   Output 2370 ml   Net -2370 ml       Physical Exam:   Physical Exam  Vitals and nursing note reviewed  Constitutional:       General: He is not in acute distress  Appearance: He is well-developed  HENT:      Head: Normocephalic and atraumatic  Eyes:      Conjunctiva/sclera: Conjunctivae normal    Cardiovascular:      Rate and Rhythm: Normal rate and regular rhythm  Heart sounds: No murmur heard  Pulmonary:      Effort: Pulmonary effort is normal  No respiratory distress  Breath sounds: Normal breath sounds  Abdominal:      Palpations: Abdomen is soft  Tenderness: There is no abdominal tenderness  Musculoskeletal:         General: No swelling  Cervical back: Neck supple  Skin:     General: Skin is warm  Capillary Refill: Capillary refill takes less than 2 seconds  Neurological:      General: No focal deficit present  Mental Status: He is alert and oriented to person, place, and time     Psychiatric:         Mood and Affect: Mood normal             Additional Data:     Labs:  Results from last 7 days   Lab Units 04/20/23  0342 04/15/23  0459 04/14/23  0450   WBC Thousand/uL 27 74*   < > 13 60*   HEMOGLOBIN g/dL 8 2*   < > 8 4*   HEMATOCRIT % 25 4*   < > 26 9*   PLATELETS Thousands/uL 490*   < > 272   BANDS PCT % 3   < >  --    NEUTROS PCT %  --   --  72   LYMPHS PCT %  --   --  18   LYMPHO PCT % 7*   < >  --    MONOS PCT %  --   --  7   MONO PCT % 4   < >  --    EOS PCT % 0   < > 1    < > = values in this interval not displayed  Results from last 7 days   Lab Units 04/20/23  0342   SODIUM mmol/L 130*   POTASSIUM mmol/L 3 5   CHLORIDE mmol/L 93*   CO2 mmol/L 26   BUN mg/dL 12   CREATININE mg/dL 1 00   ANION GAP mmol/L 11   CALCIUM mg/dL 8 7   ALBUMIN g/dL 3 3*   TOTAL BILIRUBIN mg/dL 0 50   ALK PHOS U/L 71   ALT U/L 12   AST U/L 13   GLUCOSE RANDOM mg/dL 175*         Results from last 7 days   Lab Units 04/20/23  1052 04/20/23  0728 04/19/23  2105 04/19/23  1613 04/19/23  1127 04/19/23  0733 04/18/23  2027 04/18/23  1626 04/18/23  1120 04/18/23  0726 04/17/23  2159 04/17/23  1627   POC GLUCOSE mg/dl 192* 189* 175* 178* 183* 250* 191* 203* 177* 175* 195* 140         Results from last 7 days   Lab Units 04/19/23  1941 04/19/23  1703   LACTIC ACID mmol/L 0 9  --    PROCALCITONIN ng/ml  --  3 29*       Lines/Drains:  Invasive Devices     Peripheral Intravenous Line  Duration           Peripheral IV 04/17/23 Left Antecubital 3 days                      Recent Cultures (last 7 days):   Results from last 7 days   Lab Units 04/19/23  1235   BLOOD CULTURE  Received in Microbiology Lab  Culture in Progress  Received in Microbiology Lab  Culture in Progress         Last 24 Hours Medication List:   Current Facility-Administered Medications   Medication Dose Route Frequency Provider Last Rate   • acetaminophen  975 mg Oral [facility] Vaishnavi Robert PA-C     • cholecalciferol  5,000 Units Oral Daily Vaishnavi Robert PA-C     • clonazePAM  1 mg Oral BID Vaishnavi Robert PA-C     • docusate sodium  100 mg Oral BID Heidy Robert PA-C     • fluticasone  1 spray Each Nare Daily [place], [place]     • folic acid  2,068 mcg Oral Daily Vaishnavi Robert PA-C     • gabapentin  300 mg Oral TID Heidy Robert PA-C     • heparin (porcine)  5,000 Units Subcutaneous Q8H [address] Vaishnavi Robert PA-C     • losartan  100 mg Oral QAM Vaishnavi Robert PA-C      And   • hydrochlorothiazide  25 mg Oral QAM Vaishnavi Robert PA-C     • HYDROmorphone  2 mg Oral Q4H PRN Sera Ireland MD      Or   • HYDROmorphone  4 mg Oral Q4H PRN Sera Ireland MD     • hydrOXYzine HCL  50 mg Oral Q6H PRN Heidy Robert PA-C     • insulin lispro  1-6 Units Subcutaneous 4x Daily (AC & HS) Katja Oshea PA-C     • lactated ringers  500 mL Intravenous Once Katja Oshea PA-C     • lidocaine  2 patch Topical Daily Vaishnavi Robert PA-C     • loratadine  10 mg Oral Daily Elsy May [place], [place]     • methocarbamol  750 mg Oral Q6H [address] Vaishnavi Robert PA-C     • metoprolol succinate  75 mg Oral QAM Vaishnavi Robert PA-C     • mirtazapine  30 mg Oral HS Vaishnavi Robert PA-C     • naloxone  0 04 mg Intravenous Q1MIN PRN Heidy Robert PA-C     • NIFEdipine  120 mg Oral QAM Vaishnavi Robert PA-C     • ondansetron  4 mg Intravenous Q6H PRN Heidy Robert PA-C     • piperacillin-tazobactam  3 375 g Intravenous Q6H Gillian Mendoza PA-C     • polyethylene glycol  17 g Oral BID Vaishnavi Robert PA-C     • senna  1 tablet Oral Daily Vaishnavi Robert PA-C     • sertraline  100 mg Oral QAM Vaishnavi Robert PA-C     • tamsulosin  0 4 mg Oral Daily With [employer] DANIEL Robert     • vancomycin  15 mg/kg (Adjusted) Intravenous Q12H Gillian Mendoza PA-C 1,000 mg (04/20/23 0511)        Today, Patient Was Seen By: Marsha Perez MD    **Please Note: This note may have been constructed using a voice recognition system  **

## 2023-04-20 NOTE — PROGRESS NOTES
"Progress Note - Chyna Cordero 74 y o  male MRN: [id number]    Unit/Bed#: [facility] Encounter: [id number]      Assessment:    POD 9 s/p T12- S1 Lumbar decompression and fusion  -pain controlled well this am  -PT/OT  -OOB as tolerated  -low grade temp off 99 still, urine cx pending  -wbc imprpoving  Plan d/w Neurosurgery [facility]-if wbc improving, and may dc with oral antibiotics-still waiting on cause of infection  -CT L wnl, CT abdomen/pelvis pending      Urinary retention  -urinating well now    Tachycardia, elevated wbc  -denies cp, sob  -improving with IVF  -cta neg  -pt has severe anxiety, CTA neg for PE  -duplex ordered  -low grade temp  Incision are fine, no leakage    -cont to monitor  -repeat vitals      ABLA  -most likely from surgery  -Hgb stable,cont to monitor  -tachycardia, off NC, denies dizziness and cp  -CTA neg  -trend labs    DM2, Depression  -SSI,Home meds      Subjective: -  Doing well  Still slow to ambulate  Still with R sided pelvic pain    Objective:     Vitals: Blood pressure 142/79, pulse 103, temperature 99 2 °F (37 3 °C), resp  rate 16, height 5' 8\" (1 727 m), weight 82 kg (180 lb 12 4 oz), SpO2 96 %  ,Body mass index is 27 49 kg/m²        Intake/Output Summary (Last 24 hours) at 4/20/2023 1102  Last data filed at 4/20/2023 1056  Gross per 24 hour   Intake 480 ml   Output 2370 ml   Net -1890 ml       Physical Exam:   General appearance: alert and oriented, in no acute distress  Head: Normocephalic, without obvious abnormality, atraumatic, sclerae anicteric, mucous membranes moist  Neck: no JVD and supple, symmetrical, trachea midline  Lungs: clear to auscultation, no wheezes or rales  Heart:   Mild tachycardia, regular rhythm, S1-S2 normal, no murmur  Abdomen:  non distended, soft, no guarding, no rebound, active bowel sounds  Extremities:   No edema, redness or tenderness in the calves or thighs  Strength is 5/5  Incision c/d/i with staples  Skin: Warm, dry  Nursing notes and vital signs " reviewed      Invasive Devices     Peripheral Intravenous Line  Duration           Peripheral IV 04/17/23 Left Antecubital 2 days                Lab, Imaging and other studies: I have personally reviewed pertinent reports      VTE Pharmacologic Prophylaxis: Heparin  VTE Mechanical Prophylaxis: sequential compression device

## 2023-04-20 NOTE — CASE MANAGEMENT
Case Management Progress Note    Patient name Cassi King  Location /-21 MRN [id number]  : 1955 Date 2023       LOS (days): 9  Geometric Mean LOS (GMLOS) (days): 2 80  Days to GMLOS:-6 4        OBJECTIVE:     Current admission status: Inpatient  Preferred Pharmacy:   [facility] [address], [address]   [address]   [address] [facility]   Phone: [phone] Fax: [phone]    Primary Care Provider: Claudia Gottlieb DO    Primary Insurance: [facility]  Secondary Insurance: [facility]    PROGRESS NOTE:    Pt is not medically stable for discharge at this time

## 2023-04-21 LAB
ALBUMIN SERPL BCP-MCNC: 3.1 G/DL (ref 3.5–5)
ALP SERPL-CCNC: 67 U/L (ref 34–104)
ALT SERPL W P-5'-P-CCNC: 12 U/L (ref 7–52)
ANION GAP SERPL CALCULATED.3IONS-SCNC: 9 MMOL/L (ref 4–13)
AST SERPL W P-5'-P-CCNC: 12 U/L (ref 13–39)
BACTERIA UR CULT: NORMAL
BASOPHILS # BLD AUTO: 0.08 THOUSANDS/ΜL (ref 0–0.1)
BASOPHILS NFR BLD AUTO: 0 % (ref 0–1)
BILIRUB SERPL-MCNC: 0.37 MG/DL (ref 0.2–1)
BUN SERPL-MCNC: 12 MG/DL (ref 5–25)
CA-I BLD-SCNC: 1.17 MMOL/L (ref 1.12–1.32)
CALCIUM ALBUM COR SERPL-MCNC: 9.8 MG/DL (ref 8.3–10.1)
CALCIUM SERPL-MCNC: 9.1 MG/DL (ref 8.4–10.2)
CHLORIDE SERPL-SCNC: 97 MMOL/L (ref 96–108)
CO2 SERPL-SCNC: 28 MMOL/L (ref 21–32)
CREAT SERPL-MCNC: 0.9 MG/DL (ref 0.6–1.3)
CRP SERPL QL: 306.7 MG/L
EOSINOPHIL # BLD AUTO: 0.31 THOUSAND/ΜL (ref 0–0.61)
EOSINOPHIL NFR BLD AUTO: 1 % (ref 0–6)
ERYTHROCYTE [DISTWIDTH] IN BLOOD BY AUTOMATED COUNT: 16.4 % (ref 11.6–15.1)
GFR SERPL CREATININE-BSD FRML MDRD: 89 ML/MIN/1.73SQ M
GLUCOSE SERPL-MCNC: 139 MG/DL (ref 65–140)
GLUCOSE SERPL-MCNC: 188 MG/DL (ref 65–140)
GLUCOSE SERPL-MCNC: 218 MG/DL (ref 65–140)
GLUCOSE SERPL-MCNC: 233 MG/DL (ref 65–140)
GLUCOSE SERPL-MCNC: 286 MG/DL (ref 65–140)
HCT VFR BLD AUTO: 26.6 % (ref 36.5–49.3)
HGB BLD-MCNC: 8.3 G/DL (ref 12–17)
IMM GRANULOCYTES # BLD AUTO: 0.41 THOUSAND/UL (ref 0–0.2)
IMM GRANULOCYTES NFR BLD AUTO: 2 % (ref 0–2)
LYMPHOCYTES # BLD AUTO: 1.7 THOUSANDS/ΜL (ref 0.6–4.47)
LYMPHOCYTES NFR BLD AUTO: 7 % (ref 14–44)
MAGNESIUM SERPL-MCNC: 1.7 MG/DL (ref 1.9–2.7)
MCH RBC QN AUTO: 27 PG (ref 26.8–34.3)
MCHC RBC AUTO-ENTMCNC: 31.2 G/DL (ref 31.4–37.4)
MCV RBC AUTO: 87 FL (ref 82–98)
MONOCYTES # BLD AUTO: 1.29 THOUSAND/ΜL (ref 0.17–1.22)
MONOCYTES NFR BLD AUTO: 6 % (ref 4–12)
NEUTROPHILS # BLD AUTO: 19.18 THOUSANDS/ΜL (ref 1.85–7.62)
NEUTS SEG NFR BLD AUTO: 84 % (ref 43–75)
NRBC BLD AUTO-RTO: 0 /100 WBCS
PLATELET # BLD AUTO: 577 THOUSANDS/UL (ref 149–390)
PMV BLD AUTO: 9.3 FL (ref 8.9–12.7)
POTASSIUM SERPL-SCNC: 4.1 MMOL/L (ref 3.5–5.3)
PROT SERPL-MCNC: 6.3 G/DL (ref 6.4–8.4)
RBC # BLD AUTO: 3.07 MILLION/UL (ref 3.88–5.62)
SODIUM SERPL-SCNC: 134 MMOL/L (ref 135–147)
VANCOMYCIN SERPL-MCNC: 12.4 UG/ML (ref 10–20)
WBC # BLD AUTO: 22.97 THOUSAND/UL (ref 4.31–10.16)

## 2023-04-21 RX ORDER — LANOLIN ALCOHOL/MO/W.PET/CERES
800 CREAM (GRAM) TOPICAL ONCE
Status: CANCELLED | OUTPATIENT
Start: 2023-04-21

## 2023-04-21 RX ORDER — MAGNESIUM SULFATE HEPTAHYDRATE 40 MG/ML
2 INJECTION, SOLUTION INTRAVENOUS ONCE
Status: COMPLETED | OUTPATIENT
Start: 2023-04-21 | End: 2023-04-21

## 2023-04-21 RX ORDER — INSULIN GLARGINE 100 [IU]/ML
6 INJECTION, SOLUTION SUBCUTANEOUS
Status: DISCONTINUED | OUTPATIENT
Start: 2023-04-21 | End: 2023-04-22

## 2023-04-21 RX ORDER — INSULIN LISPRO 100 [IU]/ML
2 INJECTION, SOLUTION INTRAVENOUS; SUBCUTANEOUS
Status: DISCONTINUED | OUTPATIENT
Start: 2023-04-21 | End: 2023-04-22

## 2023-04-21 RX ADMIN — SERTRALINE HYDROCHLORIDE 100 MG: 100 TABLET ORAL at 08:30

## 2023-04-21 RX ADMIN — METHOCARBAMOL 750 MG: 500 TABLET ORAL at 11:22

## 2023-04-21 RX ADMIN — PIPERACILLIN AND TAZOBACTAM 3.38 G: 3; .375 INJECTION, POWDER, LYOPHILIZED, FOR SOLUTION INTRAVENOUS at 21:27

## 2023-04-21 RX ADMIN — Medication 5000 UNITS: at 08:41

## 2023-04-21 RX ADMIN — HYDROCHLOROTHIAZIDE 25 MG: 25 TABLET ORAL at 08:29

## 2023-04-21 RX ADMIN — VANCOMYCIN HYDROCHLORIDE 1000 MG: 1 INJECTION, SOLUTION INTRAVENOUS at 18:07

## 2023-04-21 RX ADMIN — ACETAMINOPHEN 325MG 975 MG: 325 TABLET ORAL at 17:17

## 2023-04-21 RX ADMIN — HYDROMORPHONE HYDROCHLORIDE 2 MG: 2 TABLET ORAL at 17:16

## 2023-04-21 RX ADMIN — LIDOCAINE 2 PATCH: 50 PATCH TOPICAL at 08:38

## 2023-04-21 RX ADMIN — VANCOMYCIN HYDROCHLORIDE 1000 MG: 1 INJECTION, SOLUTION INTRAVENOUS at 04:56

## 2023-04-21 RX ADMIN — INSULIN LISPRO 3 UNITS: 100 INJECTION, SOLUTION INTRAVENOUS; SUBCUTANEOUS at 18:07

## 2023-04-21 RX ADMIN — FOLIC ACID 2000 MCG: 1 TABLET ORAL at 08:41

## 2023-04-21 RX ADMIN — ACETAMINOPHEN 325MG 975 MG: 325 TABLET ORAL at 08:36

## 2023-04-21 RX ADMIN — HEPARIN SODIUM 5000 UNITS: 5000 INJECTION INTRAVENOUS; SUBCUTANEOUS at 08:36

## 2023-04-21 RX ADMIN — METHOCARBAMOL 750 MG: 500 TABLET ORAL at 17:16

## 2023-04-21 RX ADMIN — HYDROMORPHONE HYDROCHLORIDE 4 MG: 2 TABLET ORAL at 00:11

## 2023-04-21 RX ADMIN — DOCUSATE SODIUM 100 MG: 100 CAPSULE, LIQUID FILLED ORAL at 08:30

## 2023-04-21 RX ADMIN — GABAPENTIN 300 MG: 300 CAPSULE ORAL at 17:24

## 2023-04-21 RX ADMIN — MAGNESIUM SULFATE HEPTAHYDRATE 2 G: 2 INJECTION, SOLUTION INTRAVENOUS at 12:10

## 2023-04-21 RX ADMIN — LORATADINE 10 MG: 10 TABLET ORAL at 08:30

## 2023-04-21 RX ADMIN — HEPARIN SODIUM 5000 UNITS: 5000 INJECTION INTRAVENOUS; SUBCUTANEOUS at 17:16

## 2023-04-21 RX ADMIN — ACETAMINOPHEN 325MG 975 MG: 325 TABLET ORAL at 00:13

## 2023-04-21 RX ADMIN — CLONAZEPAM 1 MG: 1 TABLET ORAL at 17:16

## 2023-04-21 RX ADMIN — HEPARIN SODIUM 5000 UNITS: 5000 INJECTION INTRAVENOUS; SUBCUTANEOUS at 00:13

## 2023-04-21 RX ADMIN — LOSARTAN POTASSIUM 100 MG: 50 TABLET, FILM COATED ORAL at 08:29

## 2023-04-21 RX ADMIN — PIPERACILLIN AND TAZOBACTAM 3.38 G: 3; .375 INJECTION, POWDER, LYOPHILIZED, FOR SOLUTION INTRAVENOUS at 04:04

## 2023-04-21 RX ADMIN — HYDROMORPHONE HYDROCHLORIDE 4 MG: 2 TABLET ORAL at 04:55

## 2023-04-21 RX ADMIN — POLYETHYLENE GLYCOL 3350 17 G: 17 POWDER, FOR SOLUTION ORAL at 08:29

## 2023-04-21 RX ADMIN — PIPERACILLIN AND TAZOBACTAM 3.38 G: 3; .375 INJECTION, POWDER, LYOPHILIZED, FOR SOLUTION INTRAVENOUS at 11:23

## 2023-04-21 RX ADMIN — FLUTICASONE PROPIONATE 1 SPRAY: 50 SPRAY, METERED NASAL at 08:38

## 2023-04-21 RX ADMIN — INSULIN LISPRO 2 UNITS: 100 INJECTION, SOLUTION INTRAVENOUS; SUBCUTANEOUS at 08:36

## 2023-04-21 RX ADMIN — HYDROMORPHONE HYDROCHLORIDE 2 MG: 2 TABLET ORAL at 11:23

## 2023-04-21 RX ADMIN — INSULIN LISPRO 1 UNITS: 100 INJECTION, SOLUTION INTRAVENOUS; SUBCUTANEOUS at 21:54

## 2023-04-21 RX ADMIN — HYDROXYZINE HYDROCHLORIDE 50 MG: 25 TABLET, FILM COATED ORAL at 08:43

## 2023-04-21 RX ADMIN — INSULIN LISPRO 2 UNITS: 100 INJECTION, SOLUTION INTRAVENOUS; SUBCUTANEOUS at 18:41

## 2023-04-21 RX ADMIN — SENNOSIDES 8.6 MG: 8.6 TABLET, FILM COATED ORAL at 08:30

## 2023-04-21 RX ADMIN — CLONAZEPAM 1 MG: 1 TABLET ORAL at 08:29

## 2023-04-21 RX ADMIN — HYDROXYZINE HYDROCHLORIDE 50 MG: 25 TABLET, FILM COATED ORAL at 22:00

## 2023-04-21 RX ADMIN — TAMSULOSIN HYDROCHLORIDE 0.4 MG: 0.4 CAPSULE ORAL at 17:16

## 2023-04-21 RX ADMIN — METOPROLOL SUCCINATE 75 MG: 50 TABLET, EXTENDED RELEASE ORAL at 08:30

## 2023-04-21 RX ADMIN — METHOCARBAMOL 750 MG: 500 TABLET ORAL at 04:57

## 2023-04-21 RX ADMIN — INSULIN GLARGINE 6 UNITS: 100 INJECTION, SOLUTION SUBCUTANEOUS at 21:54

## 2023-04-21 RX ADMIN — NIFEDIPINE 120 MG: 30 TABLET, FILM COATED, EXTENDED RELEASE ORAL at 08:30

## 2023-04-21 RX ADMIN — HYDROMORPHONE HYDROCHLORIDE 4 MG: 2 TABLET ORAL at 21:26

## 2023-04-21 RX ADMIN — MIRTAZAPINE 30 MG: 15 TABLET, FILM COATED ORAL at 21:27

## 2023-04-21 RX ADMIN — INSULIN LISPRO 4 UNITS: 100 INJECTION, SOLUTION INTRAVENOUS; SUBCUTANEOUS at 11:32

## 2023-04-21 RX ADMIN — GABAPENTIN 300 MG: 300 CAPSULE ORAL at 08:30

## 2023-04-21 RX ADMIN — PIPERACILLIN AND TAZOBACTAM 3.38 G: 3; .375 INJECTION, POWDER, LYOPHILIZED, FOR SOLUTION INTRAVENOUS at 17:24

## 2023-04-21 RX ADMIN — GABAPENTIN 300 MG: 300 CAPSULE ORAL at 21:27

## 2023-04-21 RX ADMIN — METHOCARBAMOL 750 MG: 500 TABLET ORAL at 00:11

## 2023-04-21 NOTE — NURSING NOTE
Pt had a couple episodes of o2 stat dropping down into the 70/80's when sleeping but rebounding right back up   Nothing that was sustained

## 2023-04-21 NOTE — PROGRESS NOTES
[place]  Progress Note  Name: Gema Patricio  MRN: [id number]  Unit/Bed#: -01 I Date of Admission: 4/11/2023   Date of Service: 4/21/2023 I Hospital Day: 10    Assessment/Plan   Gallstones  Assessment & Plan  Gallstones without surrounding inflammatory changes noted on CT abdomen done as incidental findings    Hyponatremia  Assessment & Plan  Sodium was low at 130 this morning  Serum osmolarity and urine studies pending  Consider giving IV cautiously    Anemia  Assessment & Plan  Lab Results   Component Value Date    HGB 8 3 (L) 04/21/2023    HGB 8 2 (L) 04/20/2023    HGB 8 5 (L) 04/19/2023     Baseline hemoglobin 13  Currently hemoglobin around 8 5  Likely acute blood loss as she is s/p neurosurgical intervention  Stable, continue to trend while inpatient      Urinary retention  Assessment & Plan  Patient previously had a urinary Mendoza catheter in situ; now discontinued    PONV (postoperative nausea and vomiting)  Assessment & Plan  Antiemetics as needed    Spinal stenosis of lumbar region with neurogenic claudication  Assessment & Plan  POD8 status post T12-S1 decompression and fusion  Pain well controlled now  Urinating adequately (recent Mendoza removal)  Moving BMs  PT/OT recommended STR    4/19 - pt developed signs of infection including elevated WBC, procal, and fevers  CT lumbar spine - Postsurgical changes seen, no clear signs of infx/abscess formation  CT abdomen pelvis done 4/19/2023 showed postoperative changes of the lumbar spine with stabilization rods  Of note, there is some endplate irregularity at L1-L2 more pronounced than the study of April with more similar to MRI in December given differences in modalities  Vacuum phenomenon is   still present  Neurosurgery on board    Lumbar spondylosis  Assessment & Plan  CT abdomen pelvis done showed postoperative changes of the lumbar spine with stabilization rods    Of note, there is some endplate irregularity at L1-L2 more pronounced than the study of April with more similar to MRI in December given differences in modalities  Vacuum phenomenon is   still present  Neurosurgery on board    DM (diabetes mellitus) [facility])  Assessment & Plan  Lab Results   Component Value Date    HGBA1C 6 3 (H) 03/14/2023       Recent Labs     04/20/23  2148 04/21/23  0714 04/21/23  1118 04/21/23  1739   POCGLU 299* 218* 286* 233*       Blood Sugar Average: Last 72 hrs:  (P) 890 [id number]   Home regimen: Jardiance, metformin, glipizide  Inpatient regimen: Jardiance, sliding scale insulin  Diabetic diet    Cirrhosis, alcoholic (HCC)  Assessment & Plan  Known alcoholic cirrhosis  LFTs earlier in the week were WNL  No ascites on exam, not jaundiced    Benzodiazepine dependence (HCC)  Assessment & Plan  On Klonopin 1mg BID, continue    * Sepsis ([facility] [address] )  Assessment & Plan  SIRS: Tachycardia, tachypnea, leukocytosis, fever 100 3 (despite taking tylenol 975mg q8h)  Source: unclear  Suspect abdominal vs urinary (Recent catheterization, also on jardiance)  WBC 31k  Procalcitonin 3 2  Lactic pending  UA w/ moderate bacteria, 4-10 WBCs, not reflexed to culture  Blood cultures showed no growth at 48 hours  Started on vanco/zosyn  CTA chest today w/o any acute signs of infx  CT lumbar spine w/o any acute infx or abscess  CT abdomen/pelvis showed gallstones with no inflammation and cystitis  Continue vanco/zosyn  Urine culture done shows no growth  Patient has not had any further episodes of fever                 VTE Pharmacologic Prophylaxis: VTE Score: 3 Moderate Risk (Score 3-4) - Pharmacological DVT Prophylaxis Ordered: enoxaparin (Lovenox)       Total Time Spent on Date of Encounter in care of patient: 45 minutes This time was spent on one or more of the following: performing physical exam; counseling and coordination of care; obtaining or reviewing history; documenting in the medical record; reviewing/ordering tests, medications or procedures; communicating with other healthcare professionals and discussing with patient's family/caregivers  Current Length of Stay: 10 day(s)  Current Patient Status: Inpatient   Certification Statement: The patient will continue to require additional inpatient hospital stay due to Sepsis  Discharge Plan: Anticipate discharge in 24-48 hrs to rehab facility  Code Status: Level 1 - Full Code    Subjective:   No fresh complaint    Objective:   Patient looks well clinically but slightly sleepy this morning    Vitals:   Temp (24hrs), Av 3 °F (36 8 °C), Min:97 6 °F (36 4 °C), Max:99 °F (37 2 °C)    Temp:  [97 6 °F (36 4 °C)-99 °F (37 2 °C)] 98 2 °F (36 8 °C)  HR:  [81-92] 86  Resp:  [16-17] 16  BP: (114-119)/(61-66) 117/63  SpO2:  [91 %-98 %] 95 %  Body mass index is 27 89 kg/m²  Input and Output Summary (last 24 hours): Intake/Output Summary (Last 24 hours) at 2023 1841  Last data filed at 2023 1123  Gross per 24 hour   Intake 480 ml   Output 1850 ml   Net -1370 ml       Physical Exam:   Physical Exam  Vitals and nursing note reviewed  Constitutional:       General: He is not in acute distress  Appearance: He is well-developed  HENT:      Head: Normocephalic and atraumatic  Eyes:      Conjunctiva/sclera: Conjunctivae normal    Cardiovascular:      Rate and Rhythm: Normal rate and regular rhythm  Heart sounds: No murmur heard  Pulmonary:      Effort: Pulmonary effort is normal  No respiratory distress  Breath sounds: Normal breath sounds  Abdominal:      Palpations: Abdomen is soft  Tenderness: There is no abdominal tenderness  Musculoskeletal:         General: No swelling  Cervical back: Neck supple  Skin:     General: Skin is warm  Capillary Refill: Capillary refill takes less than 2 seconds  Neurological:      General: No focal deficit present  Mental Status: He is alert and oriented to person, place, and time     Psychiatric:         Mood and Affect: Mood normal             Additional Data:     Labs:  Results from last 7 days   Lab Units 04/21/23  0514 04/20/23  0342   WBC Thousand/uL 22 97* 27 74*   HEMOGLOBIN g/dL 8 3* 8 2*   HEMATOCRIT % 26 6* 25 4*   PLATELETS Thousands/uL 577* 490*   BANDS PCT %  --  3   NEUTROS PCT % 84*  --    LYMPHS PCT % 7*  --    LYMPHO PCT %  --  7*   MONOS PCT % 6  --    MONO PCT %  --  4   EOS PCT % 1 0     Results from last 7 days   Lab Units 04/21/23  0508   SODIUM mmol/L 134*   POTASSIUM mmol/L 4 1   CHLORIDE mmol/L 97   CO2 mmol/L 28   BUN mg/dL 12   CREATININE mg/dL 0 90   ANION GAP mmol/L 9   CALCIUM mg/dL 9 1   ALBUMIN g/dL 3 1*   TOTAL BILIRUBIN mg/dL 0 37   ALK PHOS U/L 67   ALT U/L 12   AST U/L 12*   GLUCOSE RANDOM mg/dL 139         Results from last 7 days   Lab Units 04/21/23  1739 04/21/23  1118 04/21/23  0714 04/20/23  2148 04/20/23  1535 04/20/23  1052 04/20/23  0728 04/19/23  2105 04/19/23  1613 04/19/23  1127 04/19/23  0733 04/18/23  2027   POC GLUCOSE mg/dl 233* 286* 218* 299* 202* 192* 189* 175* 178* 183* 250* 191*         Results from last 7 days   Lab Units 04/19/23  1941 04/19/23  1703   LACTIC ACID mmol/L 0 9  --    PROCALCITONIN ng/ml  --  3 29*       Lines/Drains:  Invasive Devices     Peripheral Intravenous Line  Duration           Peripheral IV 04/20/23 Dorsal (posterior); Right Forearm <1 day                  Recent Cultures (last 7 days):   Results from last 7 days   Lab Units 04/20/23  1459 04/19/23  1235   BLOOD CULTURE   --  No Growth at 48 hrs  No Growth at 48 hrs     URINE CULTURE  No Growth <1000 cfu/mL  --        Last 24 Hours Medication List:   Current Facility-Administered Medications   Medication Dose Route Frequency Provider Last Rate   • acetaminophen  975 mg Oral Q8H [address] Vaishnavi Robert PA-C     • cholecalciferol  5,000 Units Oral Daily Vaishnavi Robert PA-C     • clonazePAM  1 mg Oral BID Vaishnavi Robert PA-C     • docusate sodium  100 mg Oral BID Jose G Robert PA-C     • fluticasone  1 spray Each Nare Daily Elsy May [place], [place]     • folic acid  9,657 mcg Oral Daily Vaishnavi Robert PA-C     • gabapentin  300 mg Oral TID Michael Robert PA-C     • heparin (porcine)  5,000 Units Subcutaneous Q8H [address] Vaishnavi Robert PA-C     • losartan  100 mg Oral QAM Vaishnavi Robert PA-C      And   • hydrochlorothiazide  25 mg Oral QAM Vaishnavi Robert PA-C     • HYDROmorphone  2 mg Oral Q4H PRN Monique Finch MD      Or   • HYDROmorphone  4 mg Oral Q4H PRN Monique Finch MD     • hydrOXYzine HCL  50 mg Oral Q6H PRN Michael Robert PA-C     • insulin glargine  6 Units Subcutaneous HS Lauren iLn MD     • insulin lispro  1-6 Units Subcutaneous 4x Daily (AC & HS) Katja Oshea PA-C     • insulin lispro  2 Units Subcutaneous TID With Meals Dennis Kwok MD     • lidocaine  2 patch Topical Daily Vaishnavi Robert PA-C     • loratadine  10 mg Oral Daily Elsy May [place], [place]     • methocarbamol  750 mg Oral Q6H [address] Vaishnavi Robert PA-C     • metoprolol succinate  75 mg Oral QAM Vaishnavi Robert PA-C     • mirtazapine  30 mg Oral HS Vaishnavi Robert PA-C     • naloxone  0 04 mg Intravenous Q1MIN PRN Michael Robert PA-C     • NIFEdipine  120 mg Oral QAM Vaishnavi Robert PA-C     • ondansetron  4 mg Intravenous Q6H PRN Michael Robert PA-C     • piperacillin-tazobactam  3 375 g Intravenous Q6H Gillian Mendoza PA-C     • polyethylene glycol  17 g Oral BID Vaishnavi Robert PA-C     • senna  1 tablet Oral Daily Vaishnavi Robert PA-C     • sertraline  100 mg Oral QAM Vaishnavi Robert PA-C     • tamsulosin  0 4 mg Oral Daily With [employer] DANIEL Robert     • vancomycin  15 mg/kg (Adjusted) Intravenous Q12H Gillian Mendoza PA-C 1,000 mg (04/21/23 1807)        Today, Patient Was Seen By: Lauren Lin MD    **Please Note: This note may have been constructed using a voice recognition system  **

## 2023-04-21 NOTE — PROGRESS NOTES
"Progress Note - Blanca Samson 74 y o  male MRN: [id number]    Unit/Bed#: -[facility] Encounter: [id number]      Assessment/Plan:    POD 10 s/p T12- S1 Lumbar decompression and fusion  -pain controlled well this am  -PT/OT  -OOB as tolerated  -low grade temp off 99 still, urine cx pending  -blood cx neg at 48 hrs  -wbc imprpoving  Plan d/w Neurosurgery [facility]-if wbc improving, and may dc with oral antibiotics-still waiting on cause of infection  -CT L wnl, CT abdomen/pelvis wnl        Urinary retention  -urinating well now     Tachycardia, elevated wbc  -denies cp, sob  -improving with IVF  -cta neg  -pt has severe anxiety, CTA neg for PE  -duplex wnl  -incision without drainage, staples to be left in for 2 weeks       ABLA  -most likely from surgery  -Hgb stable,cont to monitor  -tachycardia, off NC, denies dizziness and cp  -CTA neg  -trend labs     DM2, Depression  -SSI,Home meds     Subjective:   Doing ok  Still with R sided pelvic pain    Objective:     Vitals: Blood pressure 117/63, pulse 86, temperature 98 2 °F (36 8 °C), resp  rate 16, height 5' 8\" (1 727 m), weight 83 2 kg (183 lb 6 8 oz), SpO2 95 %  ,Body mass index is 27 89 kg/m²        Intake/Output Summary (Last 24 hours) at 4/21/2023 1503  Last data filed at 4/21/2023 1123  Gross per 24 hour   Intake 480 ml   Output 2250 ml   Net -1770 ml       Physical Exam:   General appearance: alert and oriented, in no acute distress  Head: Normocephalic, without obvious abnormality, atraumatic, sclerae anicteric, mucous membranes moist  Neck: no JVD and supple, symmetrical, trachea midline  Lungs: clear to auscultation, no wheezes or rales  Heart:   Mild tachycardia, regular rhythm, S1-S2 normal, no murmur  Abdomen:  non distended, soft, no guarding, no rebound, active bowel sounds  Extremities:   No edema, redness or tenderness in the calves or thighs  Strength is 5/5  Incision c/d/i with staples  Skin: Warm, dry  Nursing notes and     Invasive Devices     Peripheral " Intravenous Line  Duration           Peripheral IV 04/20/23 Dorsal (posterior); Right Forearm <1 day                Lab, Imaging and other studies: I have personally reviewed pertinent reports      VTE Pharmacologic Prophylaxis: Heparin  VTE Mechanical Prophylaxis: sequential compression device

## 2023-04-21 NOTE — OCCUPATIONAL THERAPY NOTE
Occupational Therapy Tx Note     Patient Name: Faye Moreno  Today's Date: 4/21/2023  Problem List  Principal Problem:    Sepsis ([facility] [address] )  Active Problems:    Benzodiazepine dependence ([facility] [address] )    Cirrhosis, alcoholic ([facility] [address] )    DM (diabetes mellitus) ([facility] [address] )    Lumbar spondylosis    Spinal stenosis of lumbar region with neurogenic claudication    PONV (postoperative nausea and vomiting)    Urinary retention    Anemia    Hyponatremia    Gallstones    Past Medical History  Past Medical History:   Diagnosis Date   • Anxiety    • Arthritis    • Cancer ([facility] [address] )    • Depression    • Diabetes mellitus ([facility] [address] )    • Hypertension    • Liver disease    • Mitral valve prolapse    • PONV (postoperative nausea and vomiting)    • Thyroid disease      Past Surgical History  Past Surgical History:   Procedure Laterality Date   • INCISION AND DRAINAGE OF WOUND Left 8/12/2022    Procedure: INCISION AND DRAINAGE (I&D) EXTREMITY;  Surgeon: Jessica Cain DPM;  Location:  MAIN OR;  Service: Podiatry   • JOINT REPLACEMENT Left 03/11/2022    Left TSA   • AR ARTHRODESIS POSTERIOR/PSTLAT TQ 1NTRSPC LUMBAR Bilateral 4/11/2023    Procedure: L1-S1 navigated posterior decompression with instrumented fixation fusion;  Surgeon: Lanny Webber MD;  Location:  MAIN OR;  Service: Neurosurgery   • THYROID SURGERY  2021    remove cancer         04/21/23 1123   OT Last Visit   OT Visit Date 04/21/23   Note Type   Note Type Treatment   Pain Assessment   Pain Assessment Tool Steinberg-Baker FACES   Steinberg-Baker FACES Pain Rating 6   Pain Location/Orientation Location: Back   Restrictions/Precautions   Weight Bearing Precautions Per Order No   Other Precautions Fall Risk;Pain;Spinal precautions; Bed Alarm   ADL   LB Dressing Assistance 2  Maximal Assistance   LB Dressing Deficit Don/doff R sock; Don/doff L sock   Bed Mobility   Supine to Sit 4  Minimal assistance   Additional items Assist x 1;Verbal cues; Bedrails   Sit to Supine 4  Minimal assistance   Additional items Assist x 1;Verbal cues;LE management; Bedrails   Additional Comments VC required for appropriate log rolling  Transfers   Sit to Stand 4  Minimal assistance   Additional items Assist x 1;Verbal cues   Stand to Sit 4  Minimal assistance   Additional items Assist x 1;Verbal cues   Functional Mobility   Functional Mobility 4  Minimal assistance   Additional Comments x 1, RW  Pt requried VC to take ncessary seated rest break when fatigued  Subjective   Subjective Pt received in supine position  Pt agreeable to session  Cognition   Overall Cognitive Status WFL   Arousal/Participation Alert   Attention Within functional limits   Orientation Level Oriented X4   Memory Within functional limits   Following Commands Follows multistep commands without difficulty   Activity Tolerance   Activity Tolerance Patient limited by fatigue;Patient limited by pain   Medical Staff Made Aware PT Madeleine   Assessment   Assessment Pt seen for OT tx session with focus on functional balance, functional mobility, ADL status, and transfer safety  Patient agreeable to OT treatment session  Pt received supine in bed  Performed bed mobility and transfers with min A x 1  Required VC for safe log rolling technique  Performed functional mobility with min Ax 1 requiring VC to maintain safety as well to taking necessary rest breaks  Patient continues to be functioning below baseline level, continues to require ADL assistance, occupational performance remains limited secondary to factors listed above, and pt at increased risk for falls and injury  The patient's raw score on the AM-PAC Daily Activity inpatient short form is 16, standardized score is 35 96, less than 39 4  Patients at this level are likely to benefit from DC to post-acute rehabilitation services  Please refer to the recommendation of the Occupational Therapist for safe DC planning   Patient to benefit from continued Occupational Therapy treatment while in the hospital to address deficits as defined above and maximize level of functional independence with ADLs and functional mobility  Pt left with call bell in reach, tray table in reach, needs met, bed alarm activated, SCDs on  Plan   Treatment Interventions ADL retraining;Functional transfer training;Patient/family training; Compensatory technique education; Energy conservation   Goal Expiration Date 05/01/23   OT Treatment Day 2   OT Frequency 3-5x/wk   Recommendation   UB Rehab Discharge Recommendation (PT/OT) Level 2   AM-PAC Daily Activity Inpatient   Lower Body Dressing 1   Bathing 2   Toileting 2   Upper Body Dressing 3   Grooming 4   Eating 4   Daily Activity Raw Score 16   Daily Activity Standardized Score (Calc for Raw Score >=11) 35 96   AM-PAC Applied Cognition Inpatient   Following a Speech/Presentation 4   Understanding Ordinary Conversation 4   Taking Medications 4   Remembering Where Things Are Placed or Put Away 4   Remembering List of 4-5 Errands 4   Taking Care of Complicated Tasks 4   Applied Cognition Raw Score 24   Applied Cognition Standardized Score 62 21   End of Consult   Education Provided Yes   Patient Position at End of Consult Bed/Chair alarm activated   Nurse Communication Nurse aware of consult     Pt will achieve the following goals within 10 days      *Pt will complete UB bathing and dressing with mod I     *Pt will complete LB bathing and dressing with mod I       * Pt will complete toileting w/ mod I w/ G hygiene/thoroughness using DME PRN     *Pt will perform functional transfers with on/off all surfaces with Mod I using DME as needed w/ G balance/safety      *Pt will increase standing tolerance to 5 minutes in order to complete sinkside ADL task     *Pt will improve functional mobility during ADL/IADL/leisure tasks to mod I using DME as needed w/ G balance/safety          Case Tamayo OTR/L

## 2023-04-21 NOTE — PLAN OF CARE
Problem: OCCUPATIONAL THERAPY ADULT  Goal: Performs self-care activities at highest level of function for planned discharge setting  See evaluation for individualized goals  Description: Treatment Interventions: ADL retraining, Functional transfer training, Patient/family training, Compensatory technique education, Energy conservation          See flowsheet documentation for full assessment, interventions and recommendations  Outcome: Progressing  Note: Limitation: Decreased ADL status, Decreased self-care trans, Decreased high-level ADLs, Decreased endurance  Prognosis: Fair  Assessment: Pt seen for OT tx session with focus on functional balance, functional mobility, ADL status, and transfer safety  Patient agreeable to OT treatment session  Pt received supine in bed  Performed bed mobility and transfers with min A x 1  Required VC for safe log rolling technique  Performed functional mobility with min Ax 1 requiring VC to maintain safety as well to taking necessary rest breaks  Patient continues to be functioning below baseline level, continues to require ADL assistance, occupational performance remains limited secondary to factors listed above, and pt at increased risk for falls and injury  The patient's raw score on the AM-PAC Daily Activity inpatient short form is 16, standardized score is 35 96, less than 39 4  Patients at this level are likely to benefit from DC to post-acute rehabilitation services  Please refer to the recommendation of the Occupational Therapist for safe DC planning  Patient to benefit from continued Occupational Therapy treatment while in the hospital to address deficits as defined above and maximize level of functional independence with ADLs and functional mobility  Pt left with call bell in reach, tray table in reach, needs met, bed alarm activated, SCDs on

## 2023-04-21 NOTE — ASSESSMENT & PLAN NOTE
SIRS: Tachycardia, tachypnea, leukocytosis, fever 100 3 (despite taking tylenol 975mg q8h)  Source: unclear  Suspect abdominal vs urinary (Recent catheterization, also on jardiance)  WBC 31k  Procalcitonin 3 2  Lactic pending  UA w/ moderate bacteria, 4-10 WBCs, not reflexed to culture    Blood cultures showed no growth at 48 hours  Started on vanco/zosyn  CTA chest today w/o any acute signs of infx  CT lumbar spine w/o any acute infx or abscess  CT abdomen/pelvis showed gallstones with no inflammation and cystitis  Continue vanco/zosyn  Urine culture done shows no growth  Patient has not had any further episodes of fever

## 2023-04-21 NOTE — ASSESSMENT & PLAN NOTE
Lab Results   Component Value Date    HGBA1C 6 3 (H) 03/14/2023       Recent Labs     04/20/23  2148 04/21/23  0714 04/21/23  1118 04/21/23  1739   POCGLU 299* 218* 286* 233*       Blood Sugar Average: Last 72 hrs:  (P) 909 [id number]   Home regimen: Jardiance, metformin, glipizide  Inpatient regimen: Jardiance, sliding scale insulin  Diabetic diet

## 2023-04-21 NOTE — ASSESSMENT & PLAN NOTE
Lab Results   Component Value Date    HGB 8 3 (L) 04/21/2023    HGB 8 2 (L) 04/20/2023    HGB 8 5 (L) 04/19/2023     Baseline hemoglobin 13  Currently hemoglobin around 8 5  Likely acute blood loss as she is s/p neurosurgical intervention  Stable, continue to trend while inpatient

## 2023-04-21 NOTE — CASE MANAGEMENT
Case Management Discharge Planning Note    Patient name Orpha Cutter  Location /-44 MRN [id number]  : 1955 Date 2023       Current Admission Date: 2023  Current Admission Diagnosis:Sepsis [facility])   Patient Active Problem List    Diagnosis Date Noted   • Hyponatremia 2023   • Gallstones 2023   • Anemia 2023   • Urinary retention 2023   • PONV (postoperative nausea and vomiting)    • Medical marijuana use    • Chronic bilateral low back pain without sciatica 2023   • Lumbar radiculopathy    • Spinal stenosis of lumbar region with neurogenic claudication 2023   • Chronic pain syndrome 2022   • Liver disease 08/10/2022   • Sepsis ([address] ) 2022   • Bronchitis, mucopurulent recurrent ([address] ) 2022   • Cirrhosis, alcoholic ([address] )    • Diabetic peripheral neuropathy ([address] ) 2022   • Herniated nucleus pulposus of lumbosacral region 2022   • Thyroid cancer ([address] ) 2021   • Alcoholism in remission ([address] ) 2021   • Benzodiazepine dependence ([address] ) 2021   • Bilateral adhesive capsulitis of shoulders 2021   • DM (diabetes mellitus) ([address] ) 2021   • Hypercholesterolemia 2021   • Lumbar spondylosis 2021      LOS (days): 10  Geometric Mean LOS (GMLOS) (days): 7 30  Days to GMLOS:-3 2     OBJECTIVE:  Risk of Unplanned Readmission Score: 34 59      Current admission status: Inpatient   Preferred Pharmacy:   [facility] [address] , [address] [address]  Phone: [phone] Fax: [phone]    Primary Care Provider: Katt Navarro DO    Primary Insurance: [facility]  Secondary Insurance: [facility]    DISCHARGE DETAILS:    IMM Given (Date):: 23  IMM Given to[de-identified] Patient  Family notified[de-identified] Cm reviewed IMM with pt  Additional Comments: Pt is still agreeable to rehab   Cm informed him that where he goes will depend on bed availability once he is medically stable

## 2023-04-21 NOTE — PHYSICAL THERAPY NOTE
"       PHYSICAL THERAPY NOTE    Patient Name: Kori Hoffman  TJWRD'S Date: 23 1120   PT Last Visit   PT Visit Date 23   Note Type   Note Type Treatment for insurance authorization   Pain Assessment   Pain Assessment Tool Steinberg-Baker FACES   Pain Score 6   Pain Location/Orientation Location: Back   Hospital Pain Intervention(s) Repositioned; Ambulation/increased activity   Restrictions/Precautions   Weight Bearing Precautions Per Order No   Other Precautions Chair Alarm; Bed Alarm; Fall Risk;Spinal precautions;Pain   General   Chart Reviewed Yes   Cognition   Overall Cognitive Status WFL   Arousal/Participation Alert   Attention Within functional limits   Comments Pt identified by name and   Subjective   Subjective \"I'll do what they want me to do  \"   Bed Mobility   Supine to Sit 4  Minimal assistance   Additional items Assist x 1; Increased time required;Verbal cues   Sit to Supine 4  Minimal assistance   Additional items Assist x 1; Increased time required;Verbal cues   Transfers   Sit to Stand 4  Minimal assistance   Additional items Assist x 1;Verbal cues   Stand to Sit 4  Minimal assistance   Additional items Assist x 1; Increased time required;Verbal cues   Ambulation/Elevation   Gait pattern Decreased foot clearance; Improper Weight shift; Forward Flexion; Short stride; Excessively slow   Gait Assistance 4  Minimal assist   Additional items Assist x 1;Verbal cues   Assistive Device Rolling walker   Distance 40` x1   Balance   Static Sitting Fair +   Dynamic Sitting Fair   Static Standing Fair -   Dynamic Standing Poor +   Ambulatory Poor +   Endurance Deficit   Endurance Deficit Yes   Endurance Deficit Description limited ambulation distance, fatigue   Activity Tolerance   Activity Tolerance Patient limited by pain   Nurse Made Aware Per RN, pt appropriate to treat   Assessment   Problem List Decreased strength;Decreased endurance; Impaired balance;Decreased mobility; Impaired " judgement;Pain   Assessment Pt agrees to PT treatment and is cooperative after motivation and x2 attempts  Progress noted as pt is able to ambulate increased distance with min A x1  Requires education on spinal precautions for log rolling in bed  Limited insight into deficits as pt continues to want to ambulate while eyes closed (due to pain) and BLE knees demonstrating soft buckling  Pt requires WC transfer back to bed due to gait degradation and pain  Will continue to benefit from ongoing skilled PT to maximize his functional mobility and increase his level of independence  Goals   Patient Goals to have less pain,walk in tong   STG Expiration Date 05/01/23   Short Term Goal #1 Pt will be able to: (1) perform bed mobility with supervision to promote OOB activity (2) perform sit to stand with supervision to decrease burden of care (3) ambulate at least 200` with supervision and least restrictive AD to increase activity tolerance (4) increase standing balance by 1 grade to decrease risk of falls   Plan   Treatment/Interventions Functional transfer training;LE strengthening/ROM; Therapeutic exercise; Endurance training;Patient/family training;Equipment eval/education;Gait training;Bed mobility   Progress Progressing toward goals   PT Frequency 3-5x/wk   Recommendation   UB Rehab Discharge Recommendation (PT/OT) Level 2   Equipment Recommended [facility] Recommended Wheeled walker   AM-PAC Basic Mobility Inpatient   Turning in Flat Bed Without Bedrails 3   Lying on Back to Sitting on Edge of Flat Bed Without Bedrails 3   Moving Bed to Chair 3   Standing Up From Chair Using Arms 3   Walk in Room 3   Climb 3-5 Stairs With Railing 1   Basic Mobility Inpatient Raw Score 16   Basic Mobility Standardized Score 38 32   Turning Head Towards Sound 4   Follow Simple Instructions 3   Low Function Basic Mobility Raw Score  23   Low Function Basic Mobility Standardized Score  37 22   Highest Level Of Mobility JH-HLM Goal 5: Stand one or more mins   JH-HLM Achieved 7: Walk 25 feet or more   Education   Education Provided Mobility training;Assistive device   Patient Reinforcement needed   End of Consult   Patient Position at End of Consult Supine;Bed/Chair alarm activated; All needs within reach   The patient's AM-[facility] Basic Mobility Inpatient Short Form Raw Score is 16  A Raw score of less than or equal to 16 suggests the patient may benefit from discharge to home, which DOES NOT coincide with CURRENT above PT recommendations  However please refer to therapist recommendation for discharge planning given other factors that may influence destination  Adapted from [employer] -[facility] “6-Clicks” Basic Mobility and Daily Activity Scores With Discharge Destination  Physical Therapy, 2021;101:1-9   DOI: 10 [email or web address]    Yinka Postin

## 2023-04-21 NOTE — PLAN OF CARE
Problem: Prexisting or High Potential for Compromised Skin Integrity  Goal: Skin integrity is maintained or improved  Description: INTERVENTIONS:  - Identify patients at risk for skin breakdown  - Assess and monitor skin integrity  - Assess and monitor nutrition and hydration status  - Monitor labs   - Assess for incontinence   - Turn and reposition patient  - Assist with mobility/ambulation  - Relieve pressure over bony prominences  - Avoid friction and shearing  - Provide appropriate hygiene as needed including keeping skin clean and dry  - Evaluate need for skin moisturizer/barrier cream  - Collaborate with interdisciplinary team   - Patient/family teaching  - Consider wound care consult   Outcome: Progressing     Problem: MOBILITY - ADULT  Goal: Maintain or return to baseline ADL function  Description: INTERVENTIONS:  -  Assess patient's ability to carry out ADLs; assess patient's baseline for ADL function and identify physical deficits which impact ability to perform ADLs (bathing, care of mouth/teeth, toileting, grooming, dressing, etc )  - Assess/evaluate cause of self-care deficits   - Assess range of motion  - Assess patient's mobility; develop plan if impaired  - Assess patient's need for assistive devices and provide as appropriate  - Encourage maximum independence but intervene and supervise when necessary  - Involve family in performance of ADLs  - Assess for home care needs following discharge   - Consider OT consult to assist with ADL evaluation and planning for discharge  - Provide patient education as appropriate  Outcome: Progressing     Problem: PAIN - ADULT  Goal: Verbalizes/displays adequate comfort level or baseline comfort level  Description: Interventions:  - Encourage patient to monitor pain and request assistance  - Assess pain using appropriate pain scale  - Administer analgesics based on type and severity of pain and evaluate response  - Implement non-pharmacological measures as appropriate and evaluate response  - Consider cultural and social influences on pain and pain management  - Notify physician/advanced practitioner if interventions unsuccessful or patient reports new pain  Outcome: Progressing

## 2023-04-21 NOTE — PROGRESS NOTES
Carmen Reyes is a 74 y o  male who is currently ordered Vancomycin IV with management by the Pharmacy Consult service  Relevant clinical data and objective / subjective history reviewed  Vancomycin Assessment:  Indication and Goal AUC/Trough: Soft tissue (goal -600, trough >10), -600, trough >10  Clinical Status: stable  Micro:   NGTD at 24 hours  Renal Function:  SCr: 0 9 mg/dL  CrCl: 86 mL/min  Renal replacement: Not on dialysis  Days of Therapy: 3  Current Dose: 1000mg every 12 hours  Vancomycin Plan: trough level resulted 12 4, continue current dose since level is within goal and therapeutic  New Dosing: No change  Estimated AUC: 471 mcg*hr/mL  Estimated Trough: 15 9 mcg/mL  Next Level: 4/26/23 at 0500  Renal Function Monitoring: Daily BMP and [place] will continue to follow closely for s/sx of nephrotoxicity, infusion reactions and appropriateness of therapy  BMP and CBC will be ordered per protocol  We will continue to follow the patient’s culture results and clinical progress daily      Cathy Cardoso, Pharmacist, PharmD, BCPS

## 2023-04-21 NOTE — PLAN OF CARE
Problem: PAIN - ADULT  Goal: Verbalizes/displays adequate comfort level or baseline comfort level  Description: Interventions:  - Encourage patient to monitor pain and request assistance  - Assess pain using appropriate pain scale  - Administer analgesics based on type and severity of pain and evaluate response  - Implement non-pharmacological measures as appropriate and evaluate response  - Consider cultural and social influences on pain and pain management  - Notify physician/advanced practitioner if interventions unsuccessful or patient reports new pain  Outcome: Progressing     Problem: INFECTION - ADULT  Goal: Absence or prevention of progression during hospitalization  Description: INTERVENTIONS:  - Assess and monitor for signs and symptoms of infection  - Monitor lab/diagnostic results  - Monitor all insertion sites, i e  indwelling lines, tubes, and drains  - Monitor endotracheal if appropriate and nasal secretions for changes in amount and color  - [place] appropriate cooling/warming therapies per order  - Administer medications as ordered  - Instruct and encourage patient and family to use good hand hygiene technique  - Identify and instruct in appropriate isolation precautions for identified infection/condition  Outcome: Progressing  Goal: Absence of fever/infection during neutropenic period  Description: INTERVENTIONS:  - Monitor WBC    Outcome: Progressing

## 2023-04-22 VITALS
TEMPERATURE: 98.5 F | SYSTOLIC BLOOD PRESSURE: 112 MMHG | DIASTOLIC BLOOD PRESSURE: 63 MMHG | HEART RATE: 82 BPM | WEIGHT: 190.04 LBS | HEIGHT: 68 IN | OXYGEN SATURATION: 97 % | BODY MASS INDEX: 28.8 KG/M2 | RESPIRATION RATE: 14 BRPM

## 2023-04-22 LAB
ALBUMIN SERPL BCP-MCNC: 3 G/DL (ref 3.5–5)
ALP SERPL-CCNC: 59 U/L (ref 34–104)
ALT SERPL W P-5'-P-CCNC: 10 U/L (ref 7–52)
ANION GAP SERPL CALCULATED.3IONS-SCNC: 11 MMOL/L (ref 4–13)
AST SERPL W P-5'-P-CCNC: 10 U/L (ref 13–39)
BASOPHILS # BLD AUTO: 0.07 THOUSANDS/ΜL (ref 0–0.1)
BASOPHILS NFR BLD AUTO: 1 % (ref 0–1)
BILIRUB SERPL-MCNC: 0.35 MG/DL (ref 0.2–1)
BUN SERPL-MCNC: 9 MG/DL (ref 5–25)
CALCIUM ALBUM COR SERPL-MCNC: 9.4 MG/DL (ref 8.3–10.1)
CALCIUM SERPL-MCNC: 8.6 MG/DL (ref 8.4–10.2)
CHLORIDE SERPL-SCNC: 98 MMOL/L (ref 96–108)
CO2 SERPL-SCNC: 28 MMOL/L (ref 21–32)
CREAT SERPL-MCNC: 0.79 MG/DL (ref 0.6–1.3)
CRP SERPL QL: 152.3 MG/L
EOSINOPHIL # BLD AUTO: 0.23 THOUSAND/ΜL (ref 0–0.61)
EOSINOPHIL NFR BLD AUTO: 2 % (ref 0–6)
ERYTHROCYTE [DISTWIDTH] IN BLOOD BY AUTOMATED COUNT: 16.3 % (ref 11.6–15.1)
ERYTHROCYTE [SEDIMENTATION RATE] IN BLOOD: 107 MM/HOUR (ref 0–19)
GFR SERPL CREATININE-BSD FRML MDRD: 94 ML/MIN/1.73SQ M
GLUCOSE SERPL-MCNC: 199 MG/DL (ref 65–140)
GLUCOSE SERPL-MCNC: 262 MG/DL (ref 65–140)
GLUCOSE SERPL-MCNC: 285 MG/DL (ref 65–140)
HCT VFR BLD AUTO: 24.9 % (ref 36.5–49.3)
HGB BLD-MCNC: 7.9 G/DL (ref 12–17)
IMM GRANULOCYTES # BLD AUTO: 0.14 THOUSAND/UL (ref 0–0.2)
IMM GRANULOCYTES NFR BLD AUTO: 1 % (ref 0–2)
LYMPHOCYTES # BLD AUTO: 1.62 THOUSANDS/ΜL (ref 0.6–4.47)
LYMPHOCYTES NFR BLD AUTO: 14 % (ref 14–44)
MCH RBC QN AUTO: 27.4 PG (ref 26.8–34.3)
MCHC RBC AUTO-ENTMCNC: 31.7 G/DL (ref 31.4–37.4)
MCV RBC AUTO: 87 FL (ref 82–98)
MONOCYTES # BLD AUTO: 0.75 THOUSAND/ΜL (ref 0.17–1.22)
MONOCYTES NFR BLD AUTO: 7 % (ref 4–12)
NEUTROPHILS # BLD AUTO: 8.44 THOUSANDS/ΜL (ref 1.85–7.62)
NEUTS SEG NFR BLD AUTO: 75 % (ref 43–75)
NRBC BLD AUTO-RTO: 0 /100 WBCS
PLATELET # BLD AUTO: 626 THOUSANDS/UL (ref 149–390)
PMV BLD AUTO: 9.6 FL (ref 8.9–12.7)
POTASSIUM SERPL-SCNC: 3.2 MMOL/L (ref 3.5–5.3)
PROT SERPL-MCNC: 6.3 G/DL (ref 6.4–8.4)
RBC # BLD AUTO: 2.88 MILLION/UL (ref 3.88–5.62)
SARS-COV-2 RNA RESP QL NAA+PROBE: NEGATIVE
SODIUM SERPL-SCNC: 137 MMOL/L (ref 135–147)
WBC # BLD AUTO: 11.25 THOUSAND/UL (ref 4.31–10.16)

## 2023-04-22 RX ORDER — POTASSIUM CHLORIDE 20 MEQ/1
40 TABLET, EXTENDED RELEASE ORAL ONCE
Status: COMPLETED | OUTPATIENT
Start: 2023-04-22 | End: 2023-04-22

## 2023-04-22 RX ORDER — HYDROMORPHONE HYDROCHLORIDE 2 MG/1
2 TABLET ORAL EVERY 6 HOURS PRN
Qty: 15 TABLET | Refills: 0 | Status: SHIPPED | OUTPATIENT
Start: 2023-04-22 | End: 2023-04-27

## 2023-04-22 RX ORDER — DOXYCYCLINE 100 MG/1
100 TABLET ORAL 2 TIMES DAILY
Qty: 28 TABLET | Refills: 0 | Status: SHIPPED | OUTPATIENT
Start: 2023-04-22 | End: 2023-05-06

## 2023-04-22 RX ORDER — SULFAMETHOXAZOLE AND TRIMETHOPRIM 400; 80 MG/1; MG/1
2 TABLET ORAL EVERY 12 HOURS SCHEDULED
Status: DISCONTINUED | OUTPATIENT
Start: 2023-04-22 | End: 2023-04-22 | Stop reason: HOSPADM

## 2023-04-22 RX ORDER — ASPIRIN 81 MG/1
81 TABLET, COATED ORAL DAILY
Refills: 0 | COMMUNITY
Start: 2023-04-25

## 2023-04-22 RX ORDER — INSULIN GLARGINE 100 [IU]/ML
10 INJECTION, SOLUTION SUBCUTANEOUS
Status: DISCONTINUED | OUTPATIENT
Start: 2023-04-22 | End: 2023-04-22 | Stop reason: HOSPADM

## 2023-04-22 RX ORDER — INSULIN LISPRO 100 [IU]/ML
4 INJECTION, SOLUTION INTRAVENOUS; SUBCUTANEOUS
Status: DISCONTINUED | OUTPATIENT
Start: 2023-04-22 | End: 2023-04-22 | Stop reason: HOSPADM

## 2023-04-22 RX ORDER — GABAPENTIN 300 MG/1
300 CAPSULE ORAL 3 TIMES DAILY
Qty: 45 CAPSULE | Refills: 0 | Status: SHIPPED | OUTPATIENT
Start: 2023-04-22

## 2023-04-22 RX ORDER — ACETAMINOPHEN 500 MG
500 TABLET ORAL EVERY 6 HOURS PRN
Refills: 0 | COMMUNITY
Start: 2023-04-22

## 2023-04-22 RX ORDER — DOCUSATE SODIUM 100 MG/1
100 CAPSULE, LIQUID FILLED ORAL 2 TIMES DAILY
Refills: 0 | COMMUNITY
Start: 2023-04-22

## 2023-04-22 RX ORDER — TAMSULOSIN HYDROCHLORIDE 0.4 MG/1
0.4 CAPSULE ORAL
Qty: 15 CAPSULE | Refills: 0 | Status: SHIPPED | OUTPATIENT
Start: 2023-04-22

## 2023-04-22 RX ORDER — POLYETHYLENE GLYCOL 3350 17 G/17G
17 POWDER, FOR SOLUTION ORAL 2 TIMES DAILY
Refills: 0 | COMMUNITY
Start: 2023-04-22

## 2023-04-22 RX ORDER — METHOCARBAMOL 750 MG/1
750 TABLET, FILM COATED ORAL EVERY 6 HOURS SCHEDULED
Qty: 45 TABLET | Refills: 0 | Status: SHIPPED | OUTPATIENT
Start: 2023-04-22

## 2023-04-22 RX ORDER — HYDROMORPHONE HYDROCHLORIDE 2 MG/1
2 TABLET ORAL EVERY 6 HOURS PRN
Qty: 15 TABLET | Refills: 0 | Status: SHIPPED | OUTPATIENT
Start: 2023-04-22 | End: 2023-04-22 | Stop reason: SDUPTHER

## 2023-04-22 RX ADMIN — METHOCARBAMOL 750 MG: 500 TABLET ORAL at 05:19

## 2023-04-22 RX ADMIN — FOLIC ACID 2000 MCG: 1 TABLET ORAL at 08:25

## 2023-04-22 RX ADMIN — NIFEDIPINE 120 MG: 30 TABLET, FILM COATED, EXTENDED RELEASE ORAL at 08:25

## 2023-04-22 RX ADMIN — HYDROXYZINE HYDROCHLORIDE 50 MG: 25 TABLET, FILM COATED ORAL at 05:18

## 2023-04-22 RX ADMIN — INSULIN LISPRO 2 UNITS: 100 INJECTION, SOLUTION INTRAVENOUS; SUBCUTANEOUS at 09:09

## 2023-04-22 RX ADMIN — METHOCARBAMOL 750 MG: 500 TABLET ORAL at 01:00

## 2023-04-22 RX ADMIN — INSULIN LISPRO 3 UNITS: 100 INJECTION, SOLUTION INTRAVENOUS; SUBCUTANEOUS at 09:09

## 2023-04-22 RX ADMIN — INSULIN LISPRO 4 UNITS: 100 INJECTION, SOLUTION INTRAVENOUS; SUBCUTANEOUS at 11:14

## 2023-04-22 RX ADMIN — HYDROMORPHONE HYDROCHLORIDE 2 MG: 2 TABLET ORAL at 07:47

## 2023-04-22 RX ADMIN — VANCOMYCIN HYDROCHLORIDE 1000 MG: 1 INJECTION, SOLUTION INTRAVENOUS at 05:14

## 2023-04-22 RX ADMIN — ACETAMINOPHEN 325MG 975 MG: 325 TABLET ORAL at 00:59

## 2023-04-22 RX ADMIN — CLONAZEPAM 1 MG: 1 TABLET ORAL at 08:25

## 2023-04-22 RX ADMIN — PIPERACILLIN AND TAZOBACTAM 3.38 G: 3; .375 INJECTION, POWDER, LYOPHILIZED, FOR SOLUTION INTRAVENOUS at 04:19

## 2023-04-22 RX ADMIN — ACETAMINOPHEN 325MG 975 MG: 325 TABLET ORAL at 08:32

## 2023-04-22 RX ADMIN — LIDOCAINE 2 PATCH: 50 PATCH TOPICAL at 09:09

## 2023-04-22 RX ADMIN — GABAPENTIN 300 MG: 300 CAPSULE ORAL at 08:25

## 2023-04-22 RX ADMIN — INSULIN LISPRO 4 UNITS: 100 INJECTION, SOLUTION INTRAVENOUS; SUBCUTANEOUS at 11:08

## 2023-04-22 RX ADMIN — SERTRALINE HYDROCHLORIDE 100 MG: 100 TABLET ORAL at 08:25

## 2023-04-22 RX ADMIN — Medication 5000 UNITS: at 08:25

## 2023-04-22 RX ADMIN — HEPARIN SODIUM 5000 UNITS: 5000 INJECTION INTRAVENOUS; SUBCUTANEOUS at 08:25

## 2023-04-22 RX ADMIN — HYDROMORPHONE HYDROCHLORIDE 2 MG: 2 TABLET ORAL at 14:27

## 2023-04-22 RX ADMIN — POTASSIUM CHLORIDE 40 MEQ: 1500 TABLET, EXTENDED RELEASE ORAL at 08:32

## 2023-04-22 RX ADMIN — LOSARTAN POTASSIUM 100 MG: 50 TABLET, FILM COATED ORAL at 08:25

## 2023-04-22 RX ADMIN — METHOCARBAMOL 750 MG: 500 TABLET ORAL at 11:04

## 2023-04-22 RX ADMIN — SULFAMETHOXAZOLE AND TRIMETHOPRIM 2 TABLET: 400; 80 TABLET ORAL at 10:54

## 2023-04-22 RX ADMIN — METOPROLOL SUCCINATE 75 MG: 50 TABLET, EXTENDED RELEASE ORAL at 08:25

## 2023-04-22 RX ADMIN — LORATADINE 10 MG: 10 TABLET ORAL at 08:25

## 2023-04-22 RX ADMIN — HEPARIN SODIUM 5000 UNITS: 5000 INJECTION INTRAVENOUS; SUBCUTANEOUS at 00:59

## 2023-04-22 RX ADMIN — FLUTICASONE PROPIONATE 1 SPRAY: 50 SPRAY, METERED NASAL at 08:33

## 2023-04-22 RX ADMIN — HYDROCHLOROTHIAZIDE 25 MG: 25 TABLET ORAL at 08:25

## 2023-04-22 NOTE — ASSESSMENT & PLAN NOTE
Lab Results   Component Value Date    HGBA1C 6 3 (H) 03/14/2023       Recent Labs     04/21/23  1739 04/21/23  2137 04/22/23  0813 04/22/23  1114   POCGLU 233* 188* 262* 285*       Blood Sugar Average: Last 72 hrs:  (P) 196 [id number]   Home regimen: Jardiance, metformin, glipizide  Inpatient regimen: Jardiance, sliding scale insulin  Diabetic diet

## 2023-04-22 NOTE — ASSESSMENT & PLAN NOTE
Lab Results   Component Value Date    HGB 7 9 (L) 04/22/2023    HGB 8 3 (L) 04/21/2023    HGB 8 2 (L) 04/20/2023     Baseline hemoglobin 13  Currently hemoglobin around 8 5  Likely acute blood loss as she is s/p neurosurgical intervention  Stable, continue to trend while inpatient  PCP needs to closely monitor hemoglobin

## 2023-04-22 NOTE — ASSESSMENT & PLAN NOTE
POD8 status post T12-S1 decompression and fusion  Pain well controlled now  Urinating adequately (recent Mendoza removal)  Moving BMs  PT/OT recommended STR    4/19 - pt developed signs of infection including elevated WBC, procal, and fevers  CT lumbar spine - Postsurgical changes seen, no clear signs of infx/abscess formation  CT abdomen pelvis done 4/19/2023 showed postoperative changes of the lumbar spine with stabilization rods  Of note, there is some endplate irregularity at L1-L2 more pronounced than the study of April with more similar to MRI in December given differences in modalities    Vacuum phenomenon is   still present  Neurosurgery on board  Plan for patient to be discharged to follow-up with neurosurgery as outpatient

## 2023-04-22 NOTE — PROGRESS NOTES
Chica Ashby is a 74 y o  male who is currently ordered Vancomycin IV with management by the Pharmacy Consult service  Relevant clinical data and objective / subjective history reviewed  Vancomycin Assessment:  Indication and Goal AUC/Trough: Soft tissue (goal -600, trough >10), -600, trough >10  Clinical Status: stable  Micro:   NGTD at 48 hours; MRSA swab pending  Renal Function:  SCr: 0 79 mg/dL  CrCl: 99 6 mL/min  Renal replacement: Not on dialysis  Days of Therapy: 4  Current Dose: 1000mg every 12 hours  Vancomycin Plan:  New Dosing: No change  Estimated AUC: 418 mcg*hr/mL  Estimated Trough: 13 8 mcg/mL  Next Level: 4/26/23 at 0500  Renal Function Monitoring: Daily BMP and [place] will continue to follow closely for s/sx of nephrotoxicity, infusion reactions and appropriateness of therapy  BMP and CBC will be ordered per protocol  We will continue to follow the patient’s culture results and clinical progress daily      Sherif Bermudez, Pharmacist, PharmD, BCPS

## 2023-04-22 NOTE — PLAN OF CARE
Problem: PAIN - ADULT  Goal: Verbalizes/displays adequate comfort level or baseline comfort level  Description: Interventions:  - Encourage patient to monitor pain and request assistance  - Assess pain using appropriate pain scale  - Administer analgesics based on type and severity of pain and evaluate response  - Implement non-pharmacological measures as appropriate and evaluate response  - Consider cultural and social influences on pain and pain management  - Notify physician/advanced practitioner if interventions unsuccessful or patient reports new pain  Outcome: Progressing     Problem: INFECTION - ADULT  Goal: Absence or prevention of progression during hospitalization  Description: INTERVENTIONS:  - Assess and monitor for signs and symptoms of infection  - Monitor lab/diagnostic results  - Monitor all insertion sites, i e  indwelling lines, tubes, and drains  - Monitor endotracheal if appropriate and nasal secretions for changes in amount and color  - [place] appropriate cooling/warming therapies per order  - Administer medications as ordered  - Instruct and encourage patient and family to use good hand hygiene technique  - Identify and instruct in appropriate isolation precautions for identified infection/condition  Outcome: Progressing  Goal: Absence of fever/infection during neutropenic period  Description: INTERVENTIONS:  - Monitor WBC    Outcome: Progressing

## 2023-04-22 NOTE — PLAN OF CARE
Problem: Prexisting or High Potential for Compromised Skin Integrity  Goal: Skin integrity is maintained or improved  Description: INTERVENTIONS:  - Identify patients at risk for skin breakdown  - Assess and monitor skin integrity  - Assess and monitor nutrition and hydration status  - Monitor labs   - Assess for incontinence   - Turn and reposition patient  - Assist with mobility/ambulation  - Relieve pressure over bony prominences  - Avoid friction and shearing  - Provide appropriate hygiene as needed including keeping skin clean and dry  - Evaluate need for skin moisturizer/barrier cream  - Collaborate with interdisciplinary team   - Patient/family teaching  - Consider wound care consult   Outcome: Progressing     Problem: MOBILITY - ADULT  Goal: Maintain or return to baseline ADL function  Description: INTERVENTIONS:  -  Assess patient's ability to carry out ADLs; assess patient's baseline for ADL function and identify physical deficits which impact ability to perform ADLs (bathing, care of mouth/teeth, toileting, grooming, dressing, etc )  - Assess/evaluate cause of self-care deficits   - Assess range of motion  - Assess patient's mobility; develop plan if impaired  - Assess patient's need for assistive devices and provide as appropriate  - Encourage maximum independence but intervene and supervise when necessary  - Involve family in performance of ADLs  - Assess for home care needs following discharge   - Consider OT consult to assist with ADL evaluation and planning for discharge  - Provide patient education as appropriate  Outcome: Progressing     Problem: MOBILITY - ADULT  Goal: Maintains/Returns to pre admission functional level  Description: INTERVENTIONS:  - Perform BMAT or MOVE assessment daily    - Set and communicate daily mobility goal to care team and patient/family/caregiver  - Collaborate with rehabilitation services on mobility goals if consulted  - Perform Range of Motion  times a day    - Reposition patient every  hours    - Dangle patient  times a day  - Stand patient  times a day  - Ambulate patient  times a day  - Out of bed to chair  times a day   - Out of bed for meals  times a day  - Out of bed for toileting  - Record patient progress and toleration of activity level   Outcome: Progressing

## 2023-04-22 NOTE — PROGRESS NOTES
"Progress Note - Alisson Soriano 74 y o  male MRN: [id number]    Unit/Bed#: -01 Encounter: [id number]      Assessment/Plan:  POD 11 s/p T12- S1 Lumbar decompression and fusion  -pain controlled well this am  -PT/OT  -OOB as tolerated  -urine cx negative  -blood cx neg at 48 hrs  -wbc improving, plan for d/c to rehab today  -CT L wnl, CT abdomen/pelvis wnl  -d/c on abx, pain medication        Urinary retention  -urinating well now, f/u with urology as outpt     Tachycardia, elevated wbc  -denies cp, sob  -improving with IVF  -cta neg  -pt has severe anxiety, CTA neg for PE  -duplex wnl  -incision without drainage, staples to be left in for 2 weeks        ABLA  -most likely from surgery  -Hgb stable,cont to monitor  -tachycardia, off NC, denies dizziness and cp  -CTA neg       DM2, Depression  -SSI,Home meds    Subjective:   I'm in pain  I didn't get my pain medication last night  Objective:     Vitals: Blood pressure 112/63, pulse 82, temperature 98 5 °F (36 9 °C), resp  rate 14, height 5' 8\" (1 727 m), weight 86 2 kg (190 lb 0 6 oz), SpO2 97 %  ,Body mass index is 28 89 kg/m²  Intake/Output Summary (Last 24 hours) at 4/22/2023 0816  Last data filed at 4/22/2023 0747  Gross per 24 hour   Intake --   Output 2940 ml   Net -2940 ml       Physical Exam: General appearance: alert and oriented, in no acute distress  Lungs: clear to auscultation bilaterally  Heart: regular rate and rhythm, S1, S2 normal, no murmur, click, rub or gallop     Invasive Devices     Peripheral Intravenous Line  Duration           Peripheral IV 04/20/23 Dorsal (posterior); Right Forearm 1 day                Lab, Imaging and other studies: I have personally reviewed pertinent reports      VTE Pharmacologic Prophylaxis: Sequential compression device (Venodyne)   VTE Mechanical Prophylaxis: sequential compression device   "

## 2023-04-22 NOTE — PLAN OF CARE
Problem: Prexisting or High Potential for Compromised Skin Integrity  Goal: Skin integrity is maintained or improved  Description: INTERVENTIONS:  - Identify patients at risk for skin breakdown  - Assess and monitor skin integrity  - Assess and monitor nutrition and hydration status  - Monitor labs   - Assess for incontinence   - Turn and reposition patient  - Assist with mobility/ambulation  - Relieve pressure over bony prominences  - Avoid friction and shearing  - Provide appropriate hygiene as needed including keeping skin clean and dry  - Evaluate need for skin moisturizer/barrier cream  - Collaborate with interdisciplinary team   - Patient/family teaching  - Consider wound care consult   Outcome: Progressing     Problem: MOBILITY - ADULT  Goal: Maintain or return to baseline ADL function  Description: INTERVENTIONS:  -  Assess patient's ability to carry out ADLs; assess patient's baseline for ADL function and identify physical deficits which impact ability to perform ADLs (bathing, care of mouth/teeth, toileting, grooming, dressing, etc )  - Assess/evaluate cause of self-care deficits   - Assess range of motion  - Assess patient's mobility; develop plan if impaired  - Assess patient's need for assistive devices and provide as appropriate  - Encourage maximum independence but intervene and supervise when necessary  - Involve family in performance of ADLs  - Assess for home care needs following discharge   - Consider OT consult to assist with ADL evaluation and planning for discharge  - Provide patient education as appropriate  Outcome: Progressing  Goal: Maintains/Returns to pre admission functional level  Description: INTERVENTIONS:  - Perform BMAT or MOVE assessment daily    - Set and communicate daily mobility goal to care team and patient/family/caregiver  - Collaborate with rehabilitation services on mobility goals if consulted  - Perform Range of Motion  times a day    - Reposition patient every hours   - Dangle patient  times a day  - Stand patient  times a day  - Ambulate patient  times a day  - Out of bed to chair  times a day   - Out of bed for meals  times a day  - Out of bed for toileting  - Record patient progress and toleration of activity level   Outcome: Progressing     Problem: PAIN - ADULT  Goal: Verbalizes/displays adequate comfort level or baseline comfort level  Description: Interventions:  - Encourage patient to monitor pain and request assistance  - Assess pain using appropriate pain scale  - Administer analgesics based on type and severity of pain and evaluate response  - Implement non-pharmacological measures as appropriate and evaluate response  - Consider cultural and social influences on pain and pain management  - Notify physician/advanced practitioner if interventions unsuccessful or patient reports new pain  Outcome: Progressing     Problem: INFECTION - ADULT  Goal: Absence or prevention of progression during hospitalization  Description: INTERVENTIONS:  - Assess and monitor for signs and symptoms of infection  - Monitor lab/diagnostic results  - Monitor all insertion sites, i e  indwelling lines, tubes, and drains  - Monitor endotracheal if appropriate and nasal secretions for changes in amount and color  - [place] appropriate cooling/warming therapies per order  - Administer medications as ordered  - Instruct and encourage patient and family to use good hand hygiene technique  - Identify and instruct in appropriate isolation precautions for identified infection/condition  Outcome: Progressing  Goal: Absence of fever/infection during neutropenic period  Description: INTERVENTIONS:  - Monitor WBC    Outcome: Progressing     Problem: SAFETY ADULT  Goal: Maintain or return to baseline ADL function  Description: INTERVENTIONS:  -  Assess patient's ability to carry out ADLs; assess patient's baseline for ADL function and identify physical deficits which impact ability to perform ADLs (bathing, care of mouth/teeth, toileting, grooming, dressing, etc )  - Assess/evaluate cause of self-care deficits   - Assess range of motion  - Assess patient's mobility; develop plan if impaired  - Assess patient's need for assistive devices and provide as appropriate  - Encourage maximum independence but intervene and supervise when necessary  - Involve family in performance of ADLs  - Assess for home care needs following discharge   - Consider OT consult to assist with ADL evaluation and planning for discharge  - Provide patient education as appropriate  Outcome: Progressing  Goal: Maintains/Returns to pre admission functional level  Description: INTERVENTIONS:  - Perform BMAT or MOVE assessment daily    - Set and communicate daily mobility goal to care team and patient/family/caregiver  - Collaborate with rehabilitation services on mobility goals if consulted  - Perform Range of Motion  times a day  - Reposition patient every  hours    - Dangle patient  times a day  - Stand patient  times a day  - Ambulate patient  times a day  - Out of bed to chair  times a day   - Out of bed for meals  times a day  - Out of bed for toileting  - Record patient progress and toleration of activity level   Outcome: Progressing  Goal: Patient will remain free of falls  Description: INTERVENTIONS:  - Educate patient/family on patient safety including physical limitations  - Instruct patient to call for assistance with activity   - Consult OT/PT to assist with strengthening/mobility   - Keep Call bell within reach  - Keep bed low and locked with side rails adjusted as appropriate  - Keep care items and personal belongings within reach  - Initiate and maintain comfort rounds  - Make Fall Risk Sign visible to staff  - Offer Toileting every 2 Hours, in advance of need  - Initiate/Maintain bed alarm  - Obtain necessary fall risk management equipment: socks  - Apply yellow socks and bracelet for high fall risk patients  - Consider moving patient to room near nurses station  Outcome: Progressing     Problem: DISCHARGE PLANNING  Goal: Discharge to home or other facility with appropriate resources  Description: INTERVENTIONS:  - Identify barriers to discharge w/patient and caregiver  - Arrange for needed discharge resources and transportation as appropriate  - Identify discharge learning needs (meds, wound care, etc )  - Arrange for interpretive services to assist at discharge as needed  - Refer to Case Management Department for coordinating discharge planning if the patient needs post-hospital services based on physician/advanced practitioner order or complex needs related to functional status, cognitive ability, or social support system  Outcome: Progressing     Problem: Knowledge Deficit  Goal: Patient/family/caregiver demonstrates understanding of disease process, treatment plan, medications, and discharge instructions  Description: Complete learning assessment and assess knowledge base  Interventions:  - Provide teaching at level of understanding  - Provide teaching via preferred learning methods  Outcome: Progressing     Problem: Nutrition/Hydration-ADULT  Goal: Nutrient/Hydration intake appropriate for improving, restoring or maintaining nutritional needs  Description: Monitor and assess patient's nutrition/hydration status for malnutrition  Collaborate with interdisciplinary team and initiate plan and interventions as ordered  Monitor patient's weight and dietary intake as ordered or per policy  Utilize nutrition screening tool and intervene as necessary  Determine patient's food preferences and provide high-protein, high-caloric foods as appropriate       INTERVENTIONS:  - Monitor oral intake, urinary output, labs, and treatment plans  - Assess nutrition and hydration status and recommend course of action  - Evaluate amount of meals eaten  - Assist patient with eating if necessary   - Allow adequate time for meals  - Recommend/ encourage appropriate diets, oral nutritional supplements, and vitamin/mineral supplements  - Order, calculate, and assess calorie counts as needed  - Recommend, monitor, and adjust tube feedings and TPN/PPN based on assessed needs  - Assess need for intravenous fluids  - Provide specific nutrition/hydration education as appropriate  - Include patient/family/caregiver in decisions related to nutrition  Outcome: Progressing     Problem: CARDIOVASCULAR - ADULT  Goal: Maintains optimal cardiac output and hemodynamic stability  Description: INTERVENTIONS:  - Monitor I/O, vital signs and rhythm  - Monitor for S/S and trends of decreased cardiac output  - Administer and titrate ordered vasoactive medications to optimize hemodynamic stability  - Assess quality of pulses, skin color and temperature  - Assess for signs of decreased coronary artery perfusion  - Instruct patient to report change in severity of symptoms  Outcome: Progressing  Goal: Absence of cardiac dysrhythmias or at baseline rhythm  Description: INTERVENTIONS:  - Continuous cardiac monitoring, vital signs, obtain 12 lead EKG if ordered  - Administer antiarrhythmic and heart rate control medications as ordered  - Monitor electrolytes and administer replacement therapy as ordered  Outcome: Progressing     Problem: GENITOURINARY - ADULT  Goal: Urinary catheter remains patent  Description: INTERVENTIONS:  - Assess patency of urinary catheter  - If patient has a chronic ball, consider changing catheter if non-functioning  - Follow guidelines for intermittent irrigation of non-functioning urinary catheter  Outcome: Progressing     Problem: METABOLIC, FLUID AND ELECTROLYTES - ADULT  Goal: Electrolytes maintained within normal limits  Description: INTERVENTIONS:  - Monitor labs and assess patient for signs and symptoms of electrolyte imbalances  - Administer electrolyte replacement as ordered  - Monitor response to electrolyte replacements, including repeat lab results as appropriate  - Instruct patient on fluid and nutrition as appropriate  Outcome: Progressing  Goal: Fluid balance maintained  Description: INTERVENTIONS:  - Monitor labs   - Monitor I/O and WT  - Instruct patient on fluid and nutrition as appropriate  - Assess for signs & symptoms of volume excess or deficit  Outcome: Progressing  Goal: Glucose maintained within target range  Description: INTERVENTIONS:  - Monitor Blood Glucose as ordered  - Assess for signs and symptoms of hyperglycemia and hypoglycemia  - Administer ordered medications to maintain glucose within target range  - Assess nutritional intake and initiate nutrition service referral as needed  Outcome: Progressing     Problem: SKIN/TISSUE INTEGRITY - ADULT  Goal: Skin Integrity remains intact(Skin Breakdown Prevention)  Description: Assess:  -Perform Sanjay assessment every   -Clean and moisturize skin every   -Inspect skin when repositioning, toileting, and assisting with ADLS  -Assess under medical devices such as  every   -Assess extremities for adequate circulation and sensation     Bed Management:  -Have minimal linens on bed & keep smooth, unwrinkled  -Change linens as needed when moist or perspiring  -Avoid sitting or lying in one position for more than  hours while in bed  -Keep HOB at degrees     Toileting:  -Offer bedside commode  -Assess for incontinence every  -Use incontinent care products after each incontinent episode such as     Activity:  -Mobilize patient  times a day  -Encourage activity and walks on unit  -Encourage or provide ROM exercises   -Turn and reposition patient every  Hours  -Use appropriate equipment to lift or move patient in bed  -Instruct/ Assist with weight shifting every  when out of bed in chair  -Consider limitation of chair time  hour intervals    Skin Care:  -Avoid use of baby powder, tape, friction and shearing, hot water or constrictive clothing  -Relieve pressure over bony prominences using   -Do not massage red bony areas    Next Steps:  -Teach patient strategies to minimize risks such as    -Consider consults to  interdisciplinary teams such as   Outcome: Progressing  Goal: Incision(s), wounds(s) or drain site(s) healing without S/S of infection  Description: INTERVENTIONS  - Assess and document dressing, incision, wound bed, drain sites and surrounding tissue  - Provide patient and family education  - Perform skin care/dressing changes every   Outcome: Progressing     Problem: HEMATOLOGIC - ADULT  Goal: Maintains hematologic stability  Description: INTERVENTIONS  - Assess for signs and symptoms of bleeding or hemorrhage  - Monitor labs  - Administer supportive blood products/factors as ordered and appropriate  Outcome: Progressing     Problem: MUSCULOSKELETAL - ADULT  Goal: Maintain or return mobility to safest level of function  Description: INTERVENTIONS:  - Assess patient's ability to carry out ADLs; assess patient's baseline for ADL function and identify physical deficits which impact ability to perform ADLs (bathing, care of mouth/teeth, toileting, grooming, dressing, etc )  - Assess/evaluate cause of self-care deficits   - Assess range of motion  - Assess patient's mobility  - Assess patient's need for assistive devices and provide as appropriate  - Encourage maximum independence but intervene and supervise when necessary  - Involve family in performance of ADLs  - Assess for home care needs following discharge   - Consider OT consult to assist with ADL evaluation and planning for discharge  - Provide patient education as appropriate  Outcome: Progressing  Goal: Maintain proper alignment of affected body part  Description: INTERVENTIONS:  - Support, maintain and protect limb and body alignment  - Provide patient/ family with appropriate education  Outcome: Progressing     Problem: Potential for Falls  Goal: Patient will remain free of falls  Description: INTERVENTIONS:  - Educate patient/family on patient safety including physical limitations  - Instruct patient to call for assistance with activity   - Consult OT/PT to assist with strengthening/mobility   - Keep Call bell within reach  - Keep bed low and locked with side rails adjusted as appropriate  - Keep care items and personal belongings within reach  - Initiate and maintain comfort rounds  - Make Fall Risk Sign visible to staff  - Offer Toileting every 2 Hours, in advance of need  - Initiate/Maintain bed alarm  - Obtain necessary fall risk management equipment: socks  - Apply yellow socks and bracelet for high fall risk patients  - Consider moving patient to room near nurses station  Outcome: Progressing

## 2023-04-22 NOTE — PROGRESS NOTES
-- Patient: Harsh Chauhan  -- MRN: [id number]  -- Aidin Request ID: [id number]  -- Level of care reserved: [facility]  -- Partner Reserved: [address] [place] [address],[address] - [address] [facility] [place], [phone]  -- Clinical needs requested:  -- Geography searched: 15 miles around [address]  -- Start of Service:  -- Request sent: 3:13pm EDT on 4/13/2023 by Rodolfo Rahman  -- Partner reserved: 8:00am EDT on 4/22/2023 by Mariana Wilkinson  -- Choice list shared: 7:24am EDT on 4/22/2023 by Mariana Wilkinson

## 2023-04-22 NOTE — ASSESSMENT & PLAN NOTE
CT abdomen pelvis done showed postoperative changes of the lumbar spine with stabilization rods  Of note, there is some endplate irregularity at L1-L2 more pronounced than the study of April with more similar to MRI in December given differences in modalities    Vacuum phenomenon is   still present  Neurosurgery on board  Patient clinically stable for discharge with plans to follow-up with neurosurgery as outpatient

## 2023-04-22 NOTE — CASE MANAGEMENT
Received new referral for SNF  Going to: [facility] #   [id number]  Dr Fernandez Salvage [id number]  Start care: 04/22/2023  Next review:4/25/23  [facility] @ [phone]  Verenice Mayorga ref )# [id number]   Notified: Mandy Cooper

## 2023-04-22 NOTE — CASE MANAGEMENT
Case Management Discharge Planning Note    Patient name Haylee Canales /-75 MRN [id number]  : 1955 Date 2023       Current Admission Date: 2023  Current Admission Diagnosis:Sepsis [facility])   Patient Active Problem List    Diagnosis Date Noted   • Hyponatremia 2023   • Gallstones 2023   • Anemia 2023   • Urinary retention 2023   • PONV (postoperative nausea and vomiting)    • Medical marijuana use    • Chronic bilateral low back pain without sciatica 2023   • Lumbar radiculopathy    • Spinal stenosis of lumbar region with neurogenic claudication 2023   • Chronic pain syndrome 2022   • Liver disease 08/10/2022   • Sepsis ([address] ) 2022   • Bronchitis, mucopurulent recurrent ([address] ) 2022   • Cirrhosis, alcoholic ([address] )    • Diabetic peripheral neuropathy ([address] ) 2022   • Herniated nucleus pulposus of lumbosacral region 2022   • Thyroid cancer ([address] ) 2021   • Alcoholism in remission ([address] ) 2021   • Benzodiazepine dependence ([address] ) 2021   • Bilateral adhesive capsulitis of shoulders 2021   • DM (diabetes mellitus) ([address] ) 2021   • Hypercholesterolemia 2021   • Lumbar spondylosis 2021      LOS (days): 11  Geometric Mean LOS (GMLOS) (days): 7 30  Days to GMLOS:-3 9     OBJECTIVE:  Risk of Unplanned Readmission Score: 37 24         Current admission status: Inpatient   Preferred Pharmacy:   [facility] [address], [address] [address]  Phone: [phone] Fax: [phone]    Primary Care Provider: Robinson Saldivar DO    Primary Insurance: [facility]  Secondary Insurance: [address]:  [facility] can transport patient at 2 pm today  Notified patient, left MOM for his wife Emory Godinez his nurse and Miriam of [place] of transport time

## 2023-04-22 NOTE — CASE MANAGEMENT
Case Management Discharge Planning Note    Patient name Ric Hale  Location /-81 MRN [id number]  : 1955 Date 2023       Current Admission Date: 2023  Current Admission Diagnosis:Sepsis [facility])   Patient Active Problem List    Diagnosis Date Noted   • Hyponatremia 2023   • Gallstones 2023   • Anemia 2023   • Urinary retention 2023   • PONV (postoperative nausea and vomiting)    • Medical marijuana use    • Chronic bilateral low back pain without sciatica 2023   • Lumbar radiculopathy    • Spinal stenosis of lumbar region with neurogenic claudication 2023   • Chronic pain syndrome 2022   • Liver disease 08/10/2022   • Sepsis ([address] ) 2022   • Bronchitis, mucopurulent recurrent ([address] ) 2022   • Cirrhosis, alcoholic ([address] )    • Diabetic peripheral neuropathy ([address] ) 2022   • Herniated nucleus pulposus of lumbosacral region 2022   • Thyroid cancer ([address] ) 2021   • Alcoholism in remission ([address] ) 2021   • Benzodiazepine dependence ([address] ) 2021   • Bilateral adhesive capsulitis of shoulders 2021   • DM (diabetes mellitus) ([address] ) 2021   • Hypercholesterolemia 2021   • Lumbar spondylosis 2021      LOS (days): 11  Geometric Mean LOS (GMLOS) (days): 7 30  Days to GMLOS:-3 7     OBJECTIVE:  Risk of Unplanned Readmission Score: 37 76         Current admission status: Inpatient   Preferred Pharmacy:   [facility] [address], [address] [address]  Phone: [phone] Fax: [phone]    Primary Care Provider: Sonia Worthy DO    Primary Insurance: [facility]  Secondary Insurance: [facility]    DISCHARGE DETAILS:  Continuing to follow patient  Patient is for discharge today  List of accepting provender given to patient and his preference is [employer]   Spoke with Stone Jordan of [facility] and tip bed is available today  Request for SNF auth submitted to  dishcarge support through [employer], Wait auth for BLS and SNF rehab

## 2023-04-22 NOTE — CASE MANAGEMENT
Case Management Discharge Planning Note    Patient name Snow Alvarado  Location /-06 MRN [id number]  : 1955 Date 2023       Current Admission Date: 2023  Current Admission Diagnosis:Sepsis [facility])   Patient Active Problem List    Diagnosis Date Noted   • Hyponatremia 2023   • Gallstones 2023   • Anemia 2023   • Urinary retention 2023   • PONV (postoperative nausea and vomiting)    • Medical marijuana use    • Chronic bilateral low back pain without sciatica 2023   • Lumbar radiculopathy    • Spinal stenosis of lumbar region with neurogenic claudication 2023   • Chronic pain syndrome 2022   • Liver disease 08/10/2022   • Sepsis ([address] ) 2022   • Bronchitis, mucopurulent recurrent ([address] ) 2022   • Cirrhosis, alcoholic ([address] )    • Diabetic peripheral neuropathy ([address] ) 2022   • Herniated nucleus pulposus of lumbosacral region 2022   • Thyroid cancer ([address] ) 2021   • Alcoholism in remission ([address] ) 2021   • Benzodiazepine dependence ([facility] ) 2021   • Bilateral adhesive capsulitis of shoulders 2021   • DM (diabetes mellitus) ([address] ) 2021   • Hypercholesterolemia 2021   • Lumbar spondylosis 2021      LOS (days): 11  Geometric Mean LOS (GMLOS) (days): 7 30  Days to GMLOS:-3 9     OBJECTIVE:  Risk of Unplanned Readmission Score: 37 76         Current admission status: Inpatient   Preferred Pharmacy:   [facility] [address] , [address]   [address]  [facility] [address]  Phone: [phone] Fax: [phone]    Primary Care Provider: Aditya Choudhary DO    Primary Insurance: [facility]  Secondary Insurance: [facility]    DISCHARGE DETAILS:  SNF rehab auth obtained from [facility] discharge support with SOC: 22 and NRD 23  With auth # [id number]  Information given to Marietta Marie at [facility] at [phone]     Request for BLS transport made through roundtrip, wait transport time  Patient made aware of the aove

## 2023-04-22 NOTE — PROGRESS NOTES
[place]  Progress Note  Name: Milena Bermudez  MRN: [id number]  Unit/Bed#: -01 I Date of Admission: 4/11/2023   Date of Service: 4/22/2023 I Hospital Day: 11    Assessment/Plan   Gallstones  Assessment & Plan  Gallstones without surrounding inflammatory changes noted on CT abdomen done as incidental findings    Hyponatremia  Assessment & Plan  Now resolved    Anemia  Assessment & Plan  Lab Results   Component Value Date    HGB 7 9 (L) 04/22/2023    HGB 8 3 (L) 04/21/2023    HGB 8 2 (L) 04/20/2023     Baseline hemoglobin 13  Currently hemoglobin around 8 5  Likely acute blood loss as she is s/p neurosurgical intervention  Stable, continue to trend while inpatient  PCP needs to closely monitor hemoglobin      Urinary retention  Assessment & Plan  Patient previously had a urinary Mendoza catheter in situ; now discontinued    PONV (postoperative nausea and vomiting)  Assessment & Plan  Antiemetics as needed    Spinal stenosis of lumbar region with neurogenic claudication  Assessment & Plan  POD8 status post T12-S1 decompression and fusion  Pain well controlled now  Urinating adequately (recent Mendoza removal)  Moving BMs  PT/OT recommended STR    4/19 - pt developed signs of infection including elevated WBC, procal, and fevers  CT lumbar spine - Postsurgical changes seen, no clear signs of infx/abscess formation  CT abdomen pelvis done 4/19/2023 showed postoperative changes of the lumbar spine with stabilization rods  Of note, there is some endplate irregularity at L1-L2 more pronounced than the study of April with more similar to MRI in December given differences in modalities  Vacuum phenomenon is   still present  Neurosurgery on board  Plan for patient to be discharged to follow-up with neurosurgery as outpatient    Lumbar spondylosis  Assessment & Plan  CT abdomen pelvis done showed postoperative changes of the lumbar spine with stabilization rods    Of note, there is some endplate irregularity at L1-L2 more pronounced than the study of April with more similar to MRI in December given differences in modalities  Vacuum phenomenon is   still present  Neurosurgery on board  Patient clinically stable for discharge with plans to follow-up with neurosurgery as outpatient    DM (diabetes mellitus) [facility])  Assessment & Plan  Lab Results   Component Value Date    HGBA1C 6 3 (H) 03/14/2023       Recent Labs     04/21/23  1739 04/21/23  2137 04/22/23  0813 04/22/23  1114   POCGLU 233* 188* 262* 285*       Blood Sugar Average: Last 72 hrs:  (P) 224 [id number]   Home regimen: Jardiance, metformin, glipizide  Inpatient regimen: Jardiance, sliding scale insulin  Diabetic diet    Cirrhosis, alcoholic (HCC)  Assessment & Plan  Known alcoholic cirrhosis  LFTs earlier in the week were WNL  No ascites on exam, not jaundiced    Benzodiazepine dependence (HCC)  Assessment & Plan  On Klonopin 1mg BID, continue    * Sepsis ([address] )  Assessment & Plan  SIRS: Tachycardia, tachypnea, leukocytosis, fever 100 3 (despite taking tylenol 975mg q8h)  Source: unclear  Suspect abdominal vs urinary (Recent catheterization, also on jardiance)  WBC 31k  Procalcitonin 3 2  Lactic pending  UA w/ moderate bacteria, 4-10 WBCs, not reflexed to culture  Blood cultures showed no growth at 48 hours  Started on vanco/zosyn  CTA chest today w/o any acute signs of infx  CT lumbar spine w/o any acute infx or abscess  CT abdomen/pelvis showed gallstones with no inflammation but there was cystitis  Patient was previously on vanco/zosyn  Urine culture done shows no growth  Patient has not had any further episodes of fever in the last 48 hours  Patient will be discharged on p o  Bactrim for 7 more days for suspected cystitis          VTE Pharmacologic Prophylaxis: VTE Score: 3 Moderate Risk (Score 3-4) - Pharmacological DVT Prophylaxis Ordered: enoxaparin (Lovenox)      Education and Discussions with Family / Patient: Updated contact person (daughter) at bedside  Total Time Spent on Date of Encounter in care of patient: 45 minutes This time was spent on one or more of the following: performing physical exam; counseling and coordination of care; obtaining or reviewing history; documenting in the medical record; reviewing/ordering tests, medications or procedures; communicating with other healthcare professionals and discussing with patient's family/caregivers  Current Length of Stay: 11 day(s)  Current Patient Status: Inpatient   Certification Statement: The patient will continue to require additional inpatient hospital stay due to Patient clinically stable for discharge  Discharge Plan: Anticipate discharge tomorrow to home  Code Status: Level 1 - Full Code    Subjective:   No fresh complaint    Objective:   Patient looks well clinically    Vitals:   Temp (24hrs), Av 4 °F (36 9 °C), Min:98 2 °F (36 8 °C), Max:98 5 °F (36 9 °C)    Temp:  [98 2 °F (36 8 °C)-98 5 °F (36 9 °C)] 98 5 °F (36 9 °C)  HR:  [82-86] 82  Resp:  [14-16] 14  BP: (112-117)/(63) 112/63  SpO2:  [95 %-97 %] 97 %  Body mass index is 28 89 kg/m²  Input and Output Summary (last 24 hours): Intake/Output Summary (Last 24 hours) at 2023 1434  Last data filed at 2023 1401  Gross per 24 hour   Intake --   Output 3435 ml   Net -3435 ml       Physical Exam:   Physical Exam  Vitals and nursing note reviewed  Constitutional:       General: He is not in acute distress  Appearance: He is well-developed  HENT:      Head: Normocephalic and atraumatic  Eyes:      Conjunctiva/sclera: Conjunctivae normal    Cardiovascular:      Rate and Rhythm: Normal rate and regular rhythm  Heart sounds: No murmur heard  Pulmonary:      Effort: Pulmonary effort is normal  No respiratory distress  Breath sounds: Normal breath sounds  Abdominal:      Palpations: Abdomen is soft  Tenderness: There is no abdominal tenderness     Musculoskeletal: General: No swelling  Cervical back: Neck supple  Skin:     General: Skin is warm  Capillary Refill: Capillary refill takes less than 2 seconds  Neurological:      General: No focal deficit present  Mental Status: He is alert and oriented to person, place, and time  Psychiatric:         Mood and Affect: Mood normal             Additional Data:     Labs:  Results from last 7 days   Lab Units 04/22/23  0513 04/21/23  0514 04/20/23  0342   WBC Thousand/uL 11 25*   < > 27 74*   HEMOGLOBIN g/dL 7 9*   < > 8 2*   HEMATOCRIT % 24 9*   < > 25 4*   PLATELETS Thousands/uL 626*   < > 490*   BANDS PCT %  --   --  3   NEUTROS PCT % 75   < >  --    LYMPHS PCT % 14   < >  --    LYMPHO PCT %  --   --  7*   MONOS PCT % 7   < >  --    MONO PCT %  --   --  4   EOS PCT % 2   < > 0    < > = values in this interval not displayed  Results from last 7 days   Lab Units 04/22/23  0513   SODIUM mmol/L 137   POTASSIUM mmol/L 3 2*   CHLORIDE mmol/L 98   CO2 mmol/L 28   BUN mg/dL 9   CREATININE mg/dL 0 79   ANION GAP mmol/L 11   CALCIUM mg/dL 8 6   ALBUMIN g/dL 3 0*   TOTAL BILIRUBIN mg/dL 0 35   ALK PHOS U/L 59   ALT U/L 10   AST U/L 10*   GLUCOSE RANDOM mg/dL 199*         Results from last 7 days   Lab Units 04/22/23  1114 04/22/23  0813 04/21/23  2137 04/21/23  1739 04/21/23  1118 04/21/23  0714 04/20/23  2148 04/20/23  1535 04/20/23  1052 04/20/23  0728 04/19/23  2105 04/19/23  1613   POC GLUCOSE mg/dl 285* 262* 188* 233* 286* 218* 299* 202* 192* 189* 175* 178*         Results from last 7 days   Lab Units 04/19/23  1941 04/19/23  1703   LACTIC ACID mmol/L 0 9  --    PROCALCITONIN ng/ml  --  3 29*       Lines/Drains:  Invasive Devices     None                       Recent Cultures (last 7 days):   Results from last 7 days   Lab Units 04/20/23  1459 04/19/23  1235   BLOOD CULTURE   --  No Growth at 48 hrs  No Growth at 48 hrs     URINE CULTURE  No Growth <1000 cfu/mL  --        Last 24 Hours Medication List:   Current Facility-Administered Medications   Medication Dose Route Frequency Provider Last Rate   • acetaminophen  975 mg Oral Q8H [address] Vaishnavi Robert PA-C     • cholecalciferol  5,000 Units Oral Daily Vaishnavi Robert PA-C     • clonazePAM  1 mg Oral BID Deanna Robert PA-C     • docusate sodium  100 mg Oral BID Deanna Robert PA-C     • fluticasone  1 spray Each Nare Daily [facility] Richard Herrera PA-C     • folic acid  7,559 mcg Oral Daily Vaishnavi Robert PA-C     • gabapentin  300 mg Oral TID Deanna Robert PA-C     • heparin (porcine)  5,000 Units Subcutaneous Q8H [address] Vaishnavi Robert PA-C     • losartan  100 mg Oral QAM Vaishnavi Robert PA-C      And   • hydrochlorothiazide  25 mg Oral QAM Vaishnavi Robert PA-C     • HYDROmorphone  2 mg Oral Q4H PRN Miguel Gaona MD      Or   • HYDROmorphone  4 mg Oral Q4H PRN Miguel Gaona MD     • hydrOXYzine HCL  50 mg Oral Q6H PRN Deanna Robert PA-C     • insulin glargine  10 Units Subcutaneous HS Kenn Zayas MD     • insulin lispro  1-6 Units Subcutaneous 4x Daily (AC & HS) Katja Oshea PA-C     • insulin lispro  4 Units Subcutaneous TID With Meals Olufunmilayo Taft Riedel, MD     • lidocaine  2 patch Topical Daily Vaishnavi Robert PA-C     • loratadine  10 mg Oral Daily [facility] May Saline, [place]     • methocarbamol  750 mg Oral Q6H [address] Vaishnavi Robert PA-C     • metoprolol succinate  75 mg Oral QAM Vaishnavi Robert PA-C     • mirtazapine  30 mg Oral HS Vaishnavi Robert PA-C     • naloxone  0 04 mg Intravenous Q1MIN PRN Deanna Robert PA-C     • NIFEdipine  120 mg Oral QAM Vaishnavi Robert PA-C     • ondansetron  4 mg Intravenous Q6H PRN Vaishnavi Robert PA-C     • polyethylene glycol  17 g Oral BID Vaishnavi Robert PA-C     • senna  1 tablet Oral Daily Vaishnavi Robert PA-C     • sertraline  100 mg Oral QAM Vaishnavi Robert PA-C     • sulfamethoxazole-trimethoprim  2 tablet Oral Q12H [address] Maikel Padgett MD     • tamsulosin  0 4 mg Oral Daily With Audrey Robert PA-C          Today, Patient Was Seen By: Maikel Padgett MD    **Please Note: This note may have been constructed using a voice recognition system  **

## 2023-04-22 NOTE — ASSESSMENT & PLAN NOTE
SIRS: Tachycardia, tachypnea, leukocytosis, fever 100 3 (despite taking tylenol 975mg q8h)  Source: unclear  Suspect abdominal vs urinary (Recent catheterization, also on jardiance)  WBC 31k  Procalcitonin 3 2  Lactic pending  UA w/ moderate bacteria, 4-10 WBCs, not reflexed to culture  Blood cultures showed no growth at 48 hours  Started on vanco/zosyn  CTA chest today w/o any acute signs of infx  CT lumbar spine w/o any acute infx or abscess  CT abdomen/pelvis showed gallstones with no inflammation but there was cystitis  Patient was previously on vanco/zosyn  Urine culture done shows no growth  Patient has not had any further episodes of fever in the last 48 hours  Patient will be discharged on p o   Bactrim for 7 more days for suspected cystitis

## 2023-04-22 NOTE — CASE MANAGEMENT
Case Management Discharge Planning Note    Patient name Kimber George  Location /-81 MRN [id number]  : 1955 Date 2023       Current Admission Date: 2023  Current Admission Diagnosis:Sepsis [facility])   Patient Active Problem List    Diagnosis Date Noted   • Hyponatremia 2023   • Gallstones 2023   • Anemia 2023   • Urinary retention 2023   • PONV (postoperative nausea and vomiting)    • Medical marijuana use    • Chronic bilateral low back pain without sciatica 2023   • Lumbar radiculopathy    • Spinal stenosis of lumbar region with neurogenic claudication 2023   • Chronic pain syndrome 2022   • Liver disease 08/10/2022   • Sepsis ([address] ) 2022   • Bronchitis, mucopurulent recurrent ([address] ) 2022   • Cirrhosis, alcoholic ([facility] [address] )    • Diabetic peripheral neuropathy ([address] ) 2022   • Herniated nucleus pulposus of lumbosacral region 2022   • Thyroid cancer ([address] ) 2021   • Alcoholism in remission ([address] ) 2021   • Benzodiazepine dependence ([facility] [address] ) 2021   • Bilateral adhesive capsulitis of shoulders 2021   • DM (diabetes mellitus) ([facility] [address] ) 2021   • Hypercholesterolemia 2021   • Lumbar spondylosis 2021      LOS (days): 11  Geometric Mean LOS (GMLOS) (days): 7 30  Days to GMLOS:-3 9     OBJECTIVE:  Risk of Unplanned Readmission Score: 37 76         Current admission status: Inpatient   Preferred Pharmacy:   [facility] [address], [address] [address]  Phone: [phone] Fax: [phone]    Primary Care Provider: Valerie Peterson DO    Primary Insurance: [facility] [employer]  Secondary Insurance: [employer] [address]  Number: [id number] ([facility])

## 2023-04-23 LAB — MRSA NOSE QL CULT: NORMAL

## 2023-04-24 LAB
BACTERIA BLD CULT: NORMAL
BACTERIA BLD CULT: NORMAL

## 2023-04-24 NOTE — UTILIZATION REVIEW
NOTIFICATION OF ADMISSION DISCHARGE   This is a Notification of Discharge from [facility]  Please be advised that this patient has been discharge from our facility  Below you will find the admission and discharge date and time including the patient’s disposition  UTILIZATION REVIEW CONTACT:  Husam Dubose  Utilization   Network Utilization Review Department  Phone: [phone] x carefully listen to the prompts  All voicemails are confidential   Email: [email or web address]     ADMISSION INFORMATION  PRESENTATION DATE: 4/11/2023  6:06 AM  OBERVATION ADMISSION DATE:  INPATIENT ADMISSION DATE: 4/11/23  7:30 AM   DISCHARGE DATE: 4/22/2023  3:24 PM   DISPOSITION:Non [facility] Acute Rehab    IMPORTANT INFORMATION:  Send all requests for admission clinical reviews, approved or denied determinations and any other requests to dedicated fax number below belonging to the campus where the patient is receiving treatment   List of dedicated fax numbers:  [address] DENIALS (Administrative/Medical Necessity) [phone]   [address] (Maternity/NICU/Pediatrics) [phone]   [facility] [phone]   [facility] 87 [phone]   [facility] [phone]   220 [facility] [phone]   90 [facility] [phone]   [facility]7 [facility] 119 [phone]   [facility]  [phone]   [facility] [phone]   412 [facility] [address] [facility] [phone]

## 2023-04-24 NOTE — CASE MANAGEMENT
Case Management Progress Note    Patient name Rehan Bonner  Location [address] [id number] MRN [id number]  : 1955 Date 2023       LOS (days): 11  Geometric Mean LOS (GMLOS) (days): 7 30  Days to GMLOS:-4        OBJECTIVE:        Current admission status: Inpatient  Preferred Pharmacy:   [facility] [address] [facility] [address], [address] [facility]   [address]   [address] [facility] [address]  Phone: [phone] Fax: [phone]    Primary Care Provider: Thiago Roberts DO    Primary Insurance: [facility] [employer]  Secondary Insurance: [facility]    PROGRESS NOTE:    Notification made to OP CM Handoff: [facility] OP CM regarding discharge planning and disposition  CM spoke to Irma at [facility] on  to inform of dc on

## 2023-04-25 ENCOUNTER — TELEPHONE (OUTPATIENT)
Dept: UROLOGY | Facility: AMBULATORY SURGERY CENTER | Age: 68
End: 2023-04-25

## 2023-04-25 ENCOUNTER — TELEPHONE (OUTPATIENT)
Dept: NEUROSURGERY | Facility: CLINIC | Age: 68
End: 2023-04-25

## 2023-04-25 ENCOUNTER — TELEMEDICINE (OUTPATIENT)
Dept: NEUROSURGERY | Facility: CLINIC | Age: 68
End: 2023-04-25

## 2023-04-25 DIAGNOSIS — Z98.890 POST-OPERATIVE STATE: Primary | ICD-10-CM

## 2023-04-25 NOTE — PROGRESS NOTES
Virtual Regular Visit    Verification of patient location:    [place] [facility] hold an active license [place]        Reason for visit is   Chief Complaint   Patient presents with   • Virtual Regular Visit        Encounter provider Neurosurgery Nurse Dxe    [facility]  [address] [place] [id number] [phone]      Recent Visits  No visits were found meeting these conditions  Showing recent visits within past 7 days and meeting all other requirements  Today's Visits  Date Type Provider Dept   04/25/23 Telephone Ellen Hoyt 48 today's visits and meeting all other requirements  Future Appointments  No visits were found meeting these conditions  Showing future appointments within next 150 days and meeting all other requirements       The patient was identified by name and date of birth  Burt Renzo was informed that this is a telemedicine visit and that the visit is being conducted through Telephone  My office door was closed  No one else was in the room  He acknowledged consent and understanding of privacy and security of the video platform  The patient has agreed to participate and understands they can discontinue the visit at any time          Past Medical History:   Diagnosis Date   • Anxiety    • Arthritis    • Cancer ([address] )    • Depression    • Diabetes mellitus ([facility] [address] )    • Hypertension    • Liver disease    • Mitral valve prolapse    • PONV (postoperative nausea and vomiting)    • Thyroid disease        Past Surgical History:   Procedure Laterality Date   • INCISION AND DRAINAGE OF WOUND Left 8/12/2022    Procedure: INCISION AND DRAINAGE (I&D) EXTREMITY;  Surgeon: Claire Abbott DPM;  Location:  MAIN OR;  Service: Podiatry   • JOINT REPLACEMENT Left 03/11/2022    Left TSA   • CT ARTHRODESIS POSTERIOR/PSTLAT TQ 1NTRSP "LUMBAR Bilateral 4/11/2023    Procedure: L1-S1 navigated posterior decompression with instrumented fixation fusion;  Surgeon: Tatiana Solis MD;  Location:  MAIN OR;  Service: Neurosurgery   • THYROID SURGERY  2021    remove cancer       Current Outpatient Medications   Medication Sig Dispense Refill   • ACCU-CHEK FABIANO PLUS test strip 4 (four) times a day  1   • ACCU-CHEK FASTCLIX LANCETS MISC 4 (four) times a day  1   • acetaminophen (TYLENOL) 500 mg tablet Take 1 tablet (500 mg total) by mouth every 6 (six) hours as needed for mild pain 500 mg tablet  0   • CHOLECALCIFEROL PO Take 5,000 Units by mouth daily     • clonazePAM (KlonoPIN) 1 mg tablet Take 1 mg by mouth 2 (two) times a day     • docusate sodium (COLACE) 100 mg capsule Take 1 capsule (100 mg total) by mouth 2 (two) times a day  0   • doxycycline (ADOXA) 100 MG tablet Take 1 tablet (100 mg total) by mouth 2 (two) times a day for 14 days 28 tablet 0   • fluticasone (FLONASE) 50 mcg/act nasal spray 1 spray into each nostril 2 (two) times a day     • folic acid (FOLVITE) 1 mg tablet Take 2,000 mcg by mouth daily  6   • gabapentin (NEURONTIN) 300 mg capsule Take 1 capsule (300 mg total) by mouth 3 (three) times a day 45 capsule 0   • GLOBAL INJECT EASE INSULIN SYR 31G X 5/16\" 1 ML MISC 2 (two) times a day  0   • GNP Aspirin Low Dose 81 MG EC tablet Take 1 tablet (81 mg total) by mouth daily Do not start before April 25, 2023   0   • HYDROmorphone (DILAUDID) 2 mg tablet Take 1 tablet (2 mg total) by mouth every 6 (six) hours as needed for moderate pain or severe pain for up to 5 days Max Daily Amount: 8 mg 15 tablet 0   • hydrOXYzine pamoate (VISTARIL) 50 mg capsule Take 50 mg by mouth daily at bedtime     • Jardiance 10 MG TABS Take 10 mg by mouth every morning     • ketoconazole (NIZORAL) 2 % shampoo Apply 1 application   topically daily Use as directed  6   • Lantus SoloStar 100 units/mL SOPN Inject 30 Units under the skin every morning     • " losartan-hydrochlorothiazide (HYZAAR) 100-25 MG per tablet Take 1 tablet by mouth every morning  0   • lovastatin (MEVACOR) 20 mg tablet Take 20 mg by mouth every morning  3   • metFORMIN (GLUCOPHAGE) 1000 MG tablet Take 1,000 mg by mouth 2 (two) times a day with meals      • methocarbamol (ROBAXIN) 750 mg tablet Take 1 tablet (750 mg total) by mouth every 6 (six) hours 45 tablet 0   • METOPROLOL SUCCINATE ER PO Take 75 mg by mouth every morning     • mirtazapine (REMERON) 45 MG tablet Take 45 mg by mouth daily at bedtime  1   • NIFEdipine (PROCARDIA XL) 30 mg 24 hr tablet Take 120 mg by mouth every morning Takes 90 mg and 30 mg =120 mg every AM     • NIFEdipine (PROCARDIA XL) 90 mg 24 hr tablet Take 120 mg by mouth every morning Takes 90 mg and 30 mg =120 mg every AM  3   • polyethylene glycol (MIRALAX) 17 g packet Take 17 g by mouth 2 (two) times a day  0   • sertraline (ZOLOFT) 100 mg tablet Take 100 mg by mouth every morning     • tamsulosin (FLOMAX) 0 4 mg Take 1 capsule (0 4 mg total) by mouth daily with dinner 15 capsule 0     No current facility-administered medications for this visit  Allergies   Allergen Reactions   • Abilify [Aripiprazole] Tremor     Shaking     • Cephalexin Rash   • Molds [employer] Sm[employer] Allergic Rhinitis   • Pregabalin Tremor     Lyrica - shaking feeling                                                                                    Post-Op Visit- Neurosurgery    Chief Complaint:  Patient presents post: L1-S1 navigated posterior decompression with instrumented fixation fusion - Bilateral    History of Present Illness:  Conducted the visit with Lynder Duverney, charge nurse  Lynder Duverney reports patient is doing well overall and denies any incisional issues or fevers  he denies any new weakness, numbness or tingling since the surgery  She stated patient has no complaints and is doing well  Assessment:   There were no vitals filed for this visit  Wound Exam: Incision well approximated    No erythema, edema or drainage present  Location: lumbar spine  Zeb Luong, charge nurse, to email this RN a photo of the incision site once staples are removed  Discussion/Summary:  Doing well postoperatively  Reviewed incision care with Zeb Luong including daily observation for s/s infection including: increased erythema, edema, drainage, dehiscence of incision or fever >101  Should these be observed, she understands that she is to call and/or return immediately for reassessment  Advised to continue cleansing area with mild soap and water and pat dry  Not to apply any lotions, creams, or ointments, & not to submerge in any water for 4 more weeks  Patient is to maintain activity restrictions until cleared by the surgeon  Activity levels were also reviewed with Zeb Luong in detail, patient is to lift no greater than 10 pounds and ambulation is encouraged as tolerated  Verified date/time/location of upcoming POV and reminded to complete x-rays prior to patient's next appointment  Zeb Luong is to call the office with any further questions or concerns, or if any incisional issues or fevers would arise

## 2023-04-25 NOTE — TELEPHONE ENCOUNTER
New Patient    What is the reason for the patient’s appointment? NP- Hosp f/u for urinary retention  Patient has catheter  What office location does the patient prefer? [place]    Does patient have Imaging/Lab Results: In Epic    Have patient records been requested?:  If No, are the records showing in Epic:   N/A    INSURANCE:  Do we accept the patient's insurance or is the patient Self-Pay? [facility]     HISTORY:   Has the patient had any previous Urologist(s)? No    Was the patient seen in the ED? No    Has the patient had any outside testing done? No    Does the patient have a personal history of cancer?   No

## 2023-04-26 ENCOUNTER — TELEPHONE (OUTPATIENT)
Dept: NEUROSURGERY | Facility: CLINIC | Age: 68
End: 2023-04-26

## 2023-04-26 NOTE — TELEPHONE ENCOUNTER
Received call from [facility], patient's spouse on nurseline stating that patient is experiencing severe pain, threatening to kill himself, and his schizophrenia is acting up  She is requesting pain medication, anxiety medication, and states that his blood sugar is not being managed  Explained to her as patient is currently in a facility we cannot prescribe, she would need to take to facility about adjusting medications while he is admitted there  Patient is 2 weeks post op and receiving dilaudid Q6  Additionally explained that we do not manage anxiety, mental health, or diabetes medications  She expressed understanding, stated she would contact the facility  All questions answered at this time, she was appreciative of the callback

## 2023-04-26 NOTE — DISCHARGE SUMMARY
Discharge Summary - Heidy Aiken 74 y o  male MRN: [id number]    Unit/Bed#: -01 Encounter: [id number]    Admission Date:   Admission Orders (From admission, onward)     Ordered        04/11/23 0730  Inpatient Admission  Once                        Admitting Diagnosis: Spinal stenosis of lumbar region with neurogenic claudication [M48 062]  Lumbar spondylosis [M47 816]  Lumbar radiculopathy [M54 16]    HPI: 72yo male with pmhx significant for HTN, HLD, COPD, alcoholic cirrhosis, benzodiazepine dependence for anxiety was admitted on 4/11 for lumbar surgery  Pt had hx of chronic history of back pain  He had known lumbar spondylosis and stenosis  He underwent spinal cord stimulator trial and while this improved some of his burning neuropathic pain, th epain in his legs was still significant  Pt decided to undergo lumbar surgery and was admitted for the same  Procedures Performed: 1  T12-S1 navigated posterior instrumented fixation fusion with [employer] Solera, locally harvested autograft and master graft  2   L1-L2 and L4-5 posterior decompression with bilateral laminectomy and total facetectomy  3   L2-3 and L3-4 posterior decompression with bilateral laminectomy and medial facetectomy  4  L5-S1 bilateral laminoforaminotomy for decompression        Summary of Hospital Course: Pt was admitted for the surgery and did well intra op  He underwent T12-S1 navigated instrumentation  Post op, he was admitted to ICU with acute pain service consultation for ketamine drip and narcotics for pain control  Pt had a drain in place  His ball was discontinued in the OR  Patient was scheduled to get xrays POD 0 as well  He had a drain in place  He had hgb drawn the POD 0  He did lose significant amount of blood in the OR  He was hemodynamically stable post op  POD 0, he did have some unrary retention and had to be catheterized twice  He admitted to some decreased urinary output  Patient was continued on IVF   POD 2 "and 3, patient was still having significant pain  He was still being straight catheterized  He ended up with a ball cath  Patient was reluctant to move with PT/OT  He continued on ketamine gtt till POD 5  He was encouraged multiple times to move  Pt would ambulate to bathroom  His ketamine gtt was discontinued and pt's [ain was controlled on muscle relaxants and narcotics  Patient's hgb remained low, but stable and above 7  He was tachycardic which was initially attributed to ketamine, but after Sunday, it was monitored  Pt reported he has anxiety and his HR stays somewhat high    Significant Findings, Care, Treatment and Services Provided: ***    Complications: ***    Discharge Diagnosis: ***    Medical Problems     Resolved Problems  Date Reviewed: 4/19/2023   None         Condition at Discharge: {condition:66196}         Discharge instructions/Information to patient and family:   See after visit summary for information provided to patient and family  Provisions for Follow-Up Care:  See after visit summary for information related to follow-up care and any pertinent home health orders  PCP: Belem Greer DO    Disposition: {Discharge Disposition:66499}    Planned Readmission: {EXAM; YES/NO:56738::\"No\"}      Discharge Statement   I spent *** minutes discharging the patient  This time was spent on the day of discharge  I had direct contact with the patient on the day of discharge  Additional documentation is required if more than 30 minutes were spent on discharge  Discharge Medications:  See after visit summary for reconciled discharge medications provided to patient and family           " neg as well     Discharge Diagnosis: s/p lumbar fusion    Medical Problems     Resolved Problems  Date Reviewed: 4/19/2023   None         Condition at Discharge: fair         Discharge instructions/Information to patient and family:   See after visit summary for information provided to patient and family  Provisions for Follow-Up Care:  See after visit summary for information related to follow-up care and any pertinent home health orders  PCP: Cristina Armendariz DO    Disposition: Short-term rehab     Planned Readmission: No      Discharge Statement   I spent 15 minutes discharging the patient  This time was spent on the day of discharge  I had direct contact with the patient on the day of discharge  Additional documentation is required if more than 30 minutes were spent on discharge  Discharge Medications:  See after visit summary for reconciled discharge medications provided to patient and family

## 2023-05-03 ENCOUNTER — OFFICE VISIT (OUTPATIENT)
Dept: GASTROENTEROLOGY | Facility: CLINIC | Age: 68
End: 2023-05-03

## 2023-05-03 ENCOUNTER — TELEPHONE (OUTPATIENT)
Dept: GASTROENTEROLOGY | Facility: CLINIC | Age: 68
End: 2023-05-03

## 2023-05-03 VITALS
WEIGHT: 171 LBS | BODY MASS INDEX: 25.91 KG/M2 | DIASTOLIC BLOOD PRESSURE: 57 MMHG | SYSTOLIC BLOOD PRESSURE: 123 MMHG | HEIGHT: 68 IN

## 2023-05-03 DIAGNOSIS — F10.21 ALCOHOLISM IN REMISSION (HCC): ICD-10-CM

## 2023-05-03 DIAGNOSIS — K70.30 ALCOHOLIC CIRRHOSIS, UNSPECIFIED WHETHER ASCITES PRESENT (HCC): ICD-10-CM

## 2023-05-03 DIAGNOSIS — K76.9 LIVER DISEASE: ICD-10-CM

## 2023-05-03 DIAGNOSIS — K80.20 GALLSTONES: ICD-10-CM

## 2023-05-03 DIAGNOSIS — D50.9 IRON DEFICIENCY ANEMIA, UNSPECIFIED IRON DEFICIENCY ANEMIA TYPE: Primary | ICD-10-CM

## 2023-05-03 RX ORDER — POLYETHYLENE GLYCOL 3350, SODIUM SULFATE ANHYDROUS, SODIUM BICARBONATE, SODIUM CHLORIDE, POTASSIUM CHLORIDE 236; 22.74; 6.74; 5.86; 2.97 G/4L; G/4L; G/4L; G/4L; G/4L
4000 POWDER, FOR SOLUTION ORAL ONCE
Qty: 4000 ML | Refills: 0 | Status: SHIPPED | OUTPATIENT
Start: 2023-05-03 | End: 2023-05-03

## 2023-05-03 NOTE — PROGRESS NOTES
[address] - Outpatient Consultation  Kirt Seen 74 y o  male MRN: [id number]  Encounter: [id number]    ASSESSMENT AND PLAN:      1  Iron deficiency anemia, unspecified iron deficiency anemia type  Patient with new onset iron deficiency anemia noted on labs in April 2023  Hemoglobin 7 9  Iron sat of 6%  No overt GI bleeding likely mixed chronic inflammatory anemia with iron deficiency  We will evaluate with an EGD and colonoscopy to rule out any occult sources of GI blood loss  - Colonoscopy; Future  - EGD; Future  - polyethylene glycol (Golytely) 4000 mL solution; Take 4,000 mL by mouth once for 1 dose Take 4000 mL by mouth once for 1 dose  Use as directed  Dispense: 4000 mL; Refill: 0    2  Alcoholic cirrhosis, unspecified whether ascites present ([facility] [address] )  3  Liver disease    Question of whether or not this patient has a history of alcoholic cirrhosis  Less likely in the setting of his most recent lab test and CT imaging that was not remarkable for cirrhosis  Will confirm with ultrasound  - US right upper quadrant; Future    4  Alcoholism in remission ([facility] [address] )  Continued to encourage his cessation from alcohol use  Last used in 1981     5  Gallstones  Noted on CT scan  Will evaluate with ultrasound  Denies any abdominal biliary colic symptoms  Follow up Appointment: For EGD and colonoscopy      I have spent 41 minutes which was spent on one or more of the following: obtaining and reviewing separately obtained history, performing a medically appropriate examination and evaluation, counseling and educating the patient, ordering medications, tests, and procedures, documenting clinical information in the electronic or other health record, and care coordination        Chief Complaint   Patient presents with   [facility] Follow-up    Cirrhosis       HPI:   Patient is a 68-year-old male past medical history of questionable EtOH cirrhosis, COPD, benzodiazepine dependence for anxiety, presenting for a consultation from Dr Joey Garsia for an evaluation of whether or not he has cirrhosis and iron deficiency anemia  Patient was recently in the hospital in April 2023 with spinal cord stimulator trial     Patient states that when he was about 23years old he was told that he had early cirrhosis  He stopped alcohol in 1981  Has not had any complications since  Denies any nausea vomiting jaundice itching  No edema or ascites buildup  No confusion  No GI bleeding  He was noted to have iron deficiency at the hospital   Labs detailed below  Also had unintentional weight loss  About 20 pounds  Did also lose some blood due to surgery  Denies any GI bleeding episodes  No change of bowel habits with diarrhea constipation at this time  Denies any abdominal pain  CMP done on 4/22/2023 with sodium of 137 creatinine of 0 79 AST of 10 ALT of 10 alk phos of 59 total bili of 0 35  CBC with hemoglobin of 7 9  MCV of 87  Platelets of 521  Iron panel with iron saturation of 6% TIBC of 188 and iron of 11  Showing iron deficiency anemia  CT scan reviewed by me on April 2023 showed gallstones without surrounding inflammatory changes  Fatty infiltration of the liver  GI History:  Blood thinners: Denies ASA, antiplatelet, or anticoagulation  NSAID use: Denies  Insulin use: yes    Abdominal Surgical Hx: None  Family Hx: Denies first degree relatives with GI malignancies  GI procedure Hx: colonscopy in 2022    Was unremarkable per patient    Historical Information   Past Medical History:   Diagnosis Date    Anxiety     Arthritis     Cancer ([facility] [address] )     Depression     Diabetes mellitus ([facility] [address] )     Hypertension     Liver disease     Mitral valve prolapse     PONV (postoperative nausea and vomiting)     Thyroid disease      Past Surgical History:   Procedure Laterality Date    COLONOSCOPY      INCISION AND DRAINAGE OF WOUND Left 08/12/2022    Procedure: INCISION AND "DRAINAGE (I&D) EXTREMITY;  Surgeon: Yuri Farr DPM;  Location: [facility] MAIN [facility];  Service: Podiatry    JOINT REPLACEMENT Left 2022    Left TSA    CO ARTHRODESIS POSTERIOR/PSTLAT TQ 1NTRSPC LUMBAR Bilateral 2023    Procedure: L1-S1 navigated posterior decompression with instrumented fixation fusion;  Surgeon: Jeanne Phelps MD;  Location: [facility];  Service: Neurosurgery    THYROID SURGERY      remove cancer     Social History     Substance and Sexual Activity   Alcohol Use Yes    Comment: Socially     Social History     Substance and Sexual Activity   Drug Use Yes    Frequency: 7 0 times per week    Types: Marijuana    Comment: Medical     Social History     Tobacco Use   Smoking Status Former    Packs/day: 0 25    Types: Cigarettes    Quit date: 0    Years since quittin 3   Smokeless Tobacco Never   Tobacco Comments    quit      Family History   Problem Relation Age of Onset    Colon cancer Neg Hx        Meds/Allergies     Current Outpatient Medications:     ACCU-CHEK FABIANO PLUS test strip    ACCU-CHEK FASTCLIX LANCETS MISC    clonazePAM (KlonoPIN) 1 mg tablet    docusate sodium (COLACE) 100 mg capsule    doxycycline (ADOXA) 100 MG tablet    fluticasone (FLONASE) 50 mcg/act nasal spray    folic acid (FOLVITE) 1 mg tablet    gabapentin (NEURONTIN) 300 mg capsule    GLOBAL INJECT EASE INSULIN SYR 31G X 5/16\" 1 ML MISC    GNP Aspirin Low Dose 81 MG EC tablet    hydrOXYzine pamoate (VISTARIL) 50 mg capsule    Jardiance 10 MG TABS    ketoconazole (NIZORAL) 2 % shampoo    Lantus SoloStar 100 units/mL SOPN    losartan-hydrochlorothiazide (HYZAAR) 100-25 MG per tablet    lovastatin (MEVACOR) 20 mg tablet    metFORMIN (GLUCOPHAGE) 1000 MG tablet    methocarbamol (ROBAXIN) 750 mg tablet    METOPROLOL SUCCINATE ER PO    NIFEdipine (PROCARDIA XL) 30 mg 24 hr tablet    NIFEdipine (PROCARDIA XL) 90 mg 24 hr tablet    polyethylene glycol (Golytely) 4000 mL solution    " "polyethylene glycol (MIRALAX) 17 g packet    sertraline (ZOLOFT) 100 mg tablet    tamsulosin (FLOMAX) 0 4 mg    acetaminophen (TYLENOL) 500 mg tablet    CHOLECALCIFEROL PO    mirtazapine (REMERON) 45 MG tablet    Allergies   Allergen Reactions    Abilify [Aripiprazole] Tremor     Shaking      Cephalexin Rash    M[employer] Allergic Rhinitis    Pregabalin Tremor     Lyrica - shaking feeling       PHYSICAL EXAM:    Blood pressure 123/57, height 5' 8\" (1 727 m), weight 77 6 kg (171 lb)  Body mass index is 26 kg/m²  General Appearance: NAD, cooperative, alert  Eyes: Anicteric  GI:  Soft, non-tender, non-distended; normal bowel sounds; no masses, no organomegaly   Rectal: Deferred  Musculoskeletal: No edema  Skin:  No jaundice    Lab Results:   Lab Results   Component Value Date    WBC 11 25 (H) 04/22/2023    WBC 22 97 (H) 04/21/2023    WBC 27 74 (H) 04/20/2023    HGB 7 9 (L) 04/22/2023    HGB 8 3 (L) 04/21/2023    HGB 8 2 (L) 04/20/2023    MCV 87 04/22/2023     (H) 04/22/2023     (H) 04/21/2023     (H) 04/20/2023    INR 0 89 03/14/2023    INR 1 0 01/18/2023    INR 1 0 09/15/2022     Lab Results   Component Value Date    K 3 2 (L) 04/22/2023    CL 98 04/22/2023    CO2 28 04/22/2023    BUN 9 04/22/2023    CREATININE 0 79 04/22/2023    GLUF 85 03/14/2023    CALCIUM 8 6 04/22/2023    CORRECTEDCA 9 4 04/22/2023    AST 10 (L) 04/22/2023    AST 12 (L) 04/21/2023    AST 13 04/20/2023    ALT 10 04/22/2023    ALT 12 04/21/2023    ALT 12 04/20/2023    ALKPHOS 59 04/22/2023    ALKPHOS 67 04/21/2023    ALKPHOS 71 04/20/2023    EGFR 94 04/22/2023     Lab Results   Component Value Date    IRON 11 (L) 04/20/2023    TIBC 188 (L) 04/20/2023    FERRITIN 66 04/20/2023     Lab Results   Component Value Date    LIPASE 57 (L) 04/29/2022       Radiology Results:   XR lumbar spine 2 or 3 views    Result Date: 4/14/2023  Narrative: LUMBAR SPINE INDICATION:   Post lumbar fusion   COMPARISON:  Lumbar spine " radiograph and MR lumbar spine 12/3/2022 VIEWS:  XR SPINE LUMBAR 2 OR 3 VIEWS INJURY FINDINGS: There are 5 non rib bearing lumbar vertebral bodies  There is no evidence of acute fracture or destructive osseous lesion  Posterior fusion hardware seen spanning the T12-S1 levels with overlying surgical staple line and surgical drain  Post laminectomy changes are seen spanning the L1-L5 levels  Hardware appears grossly intact  No evidence of acute malalignment  There is slight retrolisthesis of L1 on L2 which was noted on prior radiograph  Degenerative changes including prominent anterior osteophytosis are seen throughout the lumbar spine  Intervertebral disc height loss and endplate sclerosis are noted, most prominent at the L5-S1 level  The pedicles appear intact  Soft tissues are unremarkable  Impression: 1  Status post posterior fusion spanning the T12-S1 levels  Hardware appears grossly intact  Workstation performed: [id number]     XR spine lumbar 2 or 3 views injury    Result Date: 4/11/2023  Narrative: O-ARM - lumbar spine INDICATION: L1-S1 lumbar fusion   Procedure guidance  COMPARISON:  December 2, 2022 TECHNIQUE: FLUOROSCOPY TIME:   11 60 seconds 3-D DOSE: Radiation dose length product (DLP) for this visit:  0 mGy   TOTAL IMAGES: 0279 FINDINGS: Fluoroscopic guidance provided for surgical procedure  Osseous and soft tissue detail limited by technique  Impression: Fluoroscopic guidance provided for surgical procedure  Please refer to the separate procedure notes for additional details  Workstation performed: [id number]     CT spine lumbar w wo contrast    Result Date: 4/19/2023  Narrative: CT LUMBAR SPINE INDICATION:   Postoperative leukocytosis  COMPARISON: 12/2/2022 4/11/2023 TECHNIQUE:  Contiguous axial images through the lumbar spine were obtained  Sagittal and coronal reconstructions were performed    IV Contrast: 100 mL of iohexol (OMNIPAQUE) Radiation dose length product (DLP) for this visit:  1911 mGy-cm   This examination, like all CT scans performed in the [facility], was performed utilizing techniques to minimize radiation dose exposure, including the use of iterative reconstruction and automated exposure control  IMAGE QUALITY:  Diagnostic  FINDINGS: ALIGNMENT:  There are 5 lumbar type vertebral bodies  Postsurgical change from T12 to S1 posterior fusion and laminectomies VERTEBRAE:  Diffuse sclerosis throughout the lumbar vertebral bodies  Stable to mildly increased erosive changes in the inferior L1 and superior L2 endplates  No evidence of fracture  DEGENERATIVE CHANGES: Lower Thoracic spine:  Stable disc degenerative change without bony central canal stenosis  L1-2:  Decompression of the central canal   Facet osteophytosis  Moderate to severe bilateral neural foraminal narrowing  L2-3:  Decompression of the central canal   Mild bilateral neural foraminal narrowing  L3-4:  Normal decompression of the central canal   Mild right bony neural foraminal narrowing  L4-5:  Posterior disc osteophytes and disc bulge  Decompression of the central canal   Facet osteophytosis  At least moderate bilateral neural foraminal narrowing  L5-S1:  Posterior disc and uncovertebral osteophytes  Facet osteophytosis  No central canal stenosis  Severe right and moderate left neural foraminal narrowing  PARASPINAL SOFT TISSUES:  Inflammation and gas in the posterior paraspinal soft tissues overlying the surgical bed  Epidural soft tissues not clearly visualized due to extensive artifact  Impression: 1  Postsurgical change from posterior T12-S1 fusion and lumbar laminectomies  Extensive artifact from fusion hardware limits evaluation of the soft tissues  2   Decompression of the bony central canal and the lumbar spine  Multilevel moderate to severe neural foraminal narrowing  3   Epidural space not clearly visualized    Gas and inflammation in the posterior paraspinal soft tissues along the surgical bed  No evidence of organized fluid collection  Workstation performed: [id number]     XR chest 1 view    Result Date: 4/19/2023  Narrative: CHEST INDICATION:   Eval for pna  COMPARISON:  4/17/2023 CT EXAM PERFORMED/VIEWS:  XR CHEST 1 VIEW FINDINGS: Cardiomediastinal silhouette appears unremarkable  The lungs are clear  No pneumothorax or pleural effusion  Left shoulder arthroplasty     Impression: No acute cardiopulmonary disease  Workstation performed: [id number]     CTA chest pe study    Result Date: 4/17/2023  Narrative: CTA - CHEST WITH IV CONTRAST - PULMONARY ANGIOGRAM INDICATION:   Rule out PE  Back pain COMPARISON: 4/25/2020 TECHNIQUE: CTA examination of the chest was performed using angiographic technique according to a protocol specifically tailored to evaluate for pulmonary embolism  Multiplanar 2D reformatted images were created from the source data  In addition, coronal 3D MIP postprocessing was performed on the acquisition scanner  Radiation dose length product (DLP) for this visit:  428 21 mGy-cm   This examination, like all CT scans performed in the [facility], was performed utilizing techniques to minimize radiation dose exposure, including the use of iterative  reconstruction and automated exposure control  IV Contrast:  100 mL of iohexol (OMNIPAQUE)  FINDINGS: PULMONARY ARTERIAL TREE:  No pulmonary embolus is seen  LUNGS:  No evidence of acute pulmonary disease  Nodular density noted in the right posterior sulcus image 3/172, measures 0 5 cm  PLEURA:  Unremarkable  HEART/GREAT VESSELS:  Coronary artery calcification  No thoracic aortic aneurysm  MEDIASTINUM AND DIANDRA:  Unremarkable  CHEST WALL AND LOWER NECK:   Status post previous right thyroid lobectomy  VISUALIZED STRUCTURES IN THE UPPER ABDOMEN:  Cholelithiasis  Lobular cortical margins of the kidneys, as seen on prior study from 2020   OSSEOUS STRUCTURES:  No acute fracture or destructive osseous lesion  Impression: 1  No evidence of pulmonary embolism 2  No acute pulmonary disease 3  0 5 cm nodular density in the right posterior sulcus  Nonvisualized on prior study but may represent focus of scarring with surrounding vasculature  Given prior smoking history however, based on current Fleischner Society 2017 Guidelines on incidental  pulmonary nodule, optional follow-up CT at 12 months can be considered  4  Cholelithiasis 5  Coronary artery calcification Workstation performed: [id number]     VAS lower limb venous duplex study, complete bilateral    Result Date: 4/20/2023  Narrative:  [facility] REPORT CLINICAL: Indications: Physician wants to determine patency of the venous system  Operative History: No known cardiovascular surgeries   CONCLUSION: Impression: RIGHT LOWER LIMB: No evidence of acute or chronic deep vein thrombosis  No evidence of superficial thrombophlebitis noted  No evidence of valvular incompetence noted in the deep veins  Popliteal, posterior tibial and anterior tibial arterial Doppler waveforms are biphasic  LEFT LOWER LIMB: No evidence of acute or chronic deep vein thrombosis  No evidence of superficial thrombophlebitis noted  No evidence of valvular incompetence noted in the deep veins  Popliteal, posterior tibial and anterior tibial arterial Doppler waveforms are biphasic  SIGNATURE: Electronically Signed by: Justus Garnica MD, RPVI on 2023-04-20 88:33:58 PM    CT abdomen pelvis w contrast    Result Date: 4/20/2023  Narrative: CT ABDOMEN AND PELVIS WITH IV CONTRAST INDICATION:   Sepsis sepsis without origin, pelvic pain  COMPARISON:  4/29/2022; MRI lumbar spine from 12/2/2022; x-ray from 4/11/2023 TECHNIQUE:  CT examination of the abdomen and pelvis was performed  Multiplanar 2D reformatted images were created from the source data  Radiation dose length product (DLP) for this visit:  777 71 mGy-cm     This examination, like all CT scans performed in the [facility], was performed utilizing techniques to minimize radiation dose exposure, including the use of iterative  reconstruction and automated exposure control  IV Contrast:  100 mL of iohexol (OMNIPAQUE) Enteric Contrast:  Enteric contrast was not administered  FINDINGS: ABDOMEN LOWER CHEST:  No clinically significant abnormality identified in the visualized lower chest  LIVER/BILIARY TREE:  There appears to be fatty infiltration of the liver despite the presence of contrast  GALLBLADDER:  There are gallstone(s) within the gallbladder, without pericholecystic inflammatory changes  SPLEEN:  Tiny hypodensity is seen in the spleen on image 46 similar to the study of 2022  The spleen is borderline in size measuring 13 cm  PANCREAS:  Unremarkable  ADRENAL GLANDS:  Unremarkable  KIDNEYS/URETERS:  No hydronephrosis or urinary tract calculus  One or more sharply circumscribed subcentimeter renal hypodensities are present, too small to accurately characterize, and statistically most likely benign findings  According to recent literature (Radiology 2019) no further workup of these findings is recommended  Contrast is noted within the collecting system probably from prior lumbar spine CT from 4:30 PM STOMACH AND BOWEL:  No small bowel dilatation  Diffuse colonic distention may be related to mild ileus  APPENDIX:  A normal appendix was visualized  ABDOMINOPELVIC CAVITY:  No ascites  No pneumoperitoneum  No lymphadenopathy  VESSELS:  Unremarkable for patient's age  PELVIS REPRODUCTIVE ORGANS:  Prostate hypertrophy indents the bladder  URINARY BLADDER:  Diffuse bladder wall thickening  ABDOMINAL WALL/INGUINAL REGIONS:  Unremarkable  OSSEOUS STRUCTURES:  No acute fracture or destructive osseous lesion  Metallic rods in the lumbar spine are noted which causes streak artifact limiting the examination  Orthopedic hardware appears intact  Skin clips are seen posteriorly  Multilevel degenerative spondylosis noted  Schmorl's node formation is seen at multiple levels  The endplates are slightly more irregular at L1-L2 with grade 1 retrolisthesis as compared to prior study of April  Some signal changes were noted on MRI in December at this level     Vacuum phenomenon is still demonstrated  There is laminectomy changes with postoperative gas also seen in the soft tissues  Impression: Gallstones without surrounding inflammatory change  Diffuse bladder wall thickening could be related to muscular hypertrophy or cystitis  Small amount of gas in the bladder lumen could be related to prior Mendoza catheter placement  No evidence of intra-abdominal collection  Postoperative changes of the lumbar spine with stabilization rods  Of note, there is some endplate irregularity at L1-L2 more pronounced than the study of April with more similar to MRI in December given differences in modalities  Vacuum phenomenon is still present  This may represent severe degenerative erosive change although in the context of sepsis, an inflammatory component is difficult to exclude  Findings should be put in context with the duration of suspected infection   This was discussed with Alisson MENDENHALL on 4/20/2023 Workstation performed: [id number]

## 2023-05-03 NOTE — TELEPHONE ENCOUNTER
Scheduled date of combo (as of today):  Physician performing colonoscopy:Je  Location of colonoscopy:[facility]  Bowel prep reviewed with patient:Vinicio  Instructions reviewed with patient by:TOMÁS  Clearances: no

## 2023-05-08 ENCOUNTER — TELEPHONE (OUTPATIENT)
Dept: OTHER | Facility: OTHER | Age: 68
End: 2023-05-08

## 2023-05-08 NOTE — TELEPHONE ENCOUNTER
Patient called in stating he received a call from [facility] at [address] [facility] office   Called back to verify the address where appointment will take place  Requesting a call back from [facility] once message is received       [facility]

## 2023-05-08 NOTE — TELEPHONE ENCOUNTER
Attempted to call Juan to advise that there is no Clement that works in our office and its possible he was referring to Bone stimulator rep  Left a detailed message encouraging a call back with questions

## 2023-05-10 ENCOUNTER — HOSPITAL ENCOUNTER (OUTPATIENT)
Dept: ULTRASOUND IMAGING | Facility: HOSPITAL | Age: 68
Discharge: HOME | End: 2023-05-10

## 2023-05-10 DIAGNOSIS — C73 THYROID CANCER (HCC): ICD-10-CM

## 2023-05-15 NOTE — PROGRESS NOTES
5/16/2023    Blaze Peña  1955  [id number]      Assessment  -Acute urinary retention  -BPH with lower urinary tract symptoms  -Prostate cancer screening    Discussion/Plan  Gay De La Cruz is a 74 y o  male who presents in consultation    1  BPH with lower urinary tract symptoms, urinary retention- PVR in the office today is 319 mL  He reports difficulties urinating since Mendoza catheter was removed prior to hospital discharge  Cause likely secondary to underlying BPH as well as decreased mobility after recent surgery  Would recommend insertion of indwelling Mendoza catheter and proceeding with cystoscopy to further evaluate bladder outlet  He is amenable with plan  Please refer to nurses note  Indwelling Mendoza catheter inserted without any difficulties  Informed consent for cystoscopy was discussed  PSA ordered  We will perform prostate cancer screening after acute urinary retention has resolved  Maintain indwelling Mendoza catheter to gravity drainage  Patient will follow-up with MD for cystoscopy to further evaluate bladder outlet  He was advised to call sooner with any questions or issues       -All questions answered, patient and wife agrees with plan      History of Present Illness  74 y o  male who presents in consultation today for evaluation of acute urinary retention  Patient accompanied today by his wife  He was recently discharged from the hospital on 4/22/2023 with spinal stenosis, gallstones, and sepsis  Patient underwent spinal surgery on 4/11/2023  He developed acute urinary retention and required CIC and placement of indwelling Mendoza catheter which was removed prior to discharge  Patient currently on tamsulosin 0 4 mg daily  CT scan showed no evidence of hydronephrosis or abnormality, BPH noted with diffuse bladder wall thickening  Since discharge, he has been experiencing increased urinary hesitancy, weak stream, and incomplete bladder emptying    Patient denies any episodes of gross hematuria or dysuria  No prior PSAs available for review  He denies any strong family history of prostate malignancy  Patient denies any additional urologic history, surgical invention, or instrumentation  Review of Systems  Review of Systems   Constitutional: Negative  HENT: Negative  Respiratory: Negative  Cardiovascular: Negative  Gastrointestinal: Negative  Genitourinary: Positive for decreased urine volume and difficulty urinating  Negative for dysuria, flank pain, frequency, hematuria and urgency  Musculoskeletal: Negative  Skin: Negative  Neurological: Negative  Psychiatric/Behavioral: Negative             Past Medical History  Past Medical History:   Diagnosis Date   • Anxiety    • Arthritis    • Cancer ([facility] )    • Depression    • Diabetes mellitus ([facility] )    • Hypertension    • Liver disease    • Mitral valve prolapse    • PONV (postoperative nausea and vomiting)    • Thyroid disease        Past Social History  Past Surgical History:   Procedure Laterality Date   • COLONOSCOPY     • INCISION AND DRAINAGE OF WOUND Left 08/12/2022    Procedure: INCISION AND DRAINAGE (I&D) EXTREMITY;  Surgeon: Brant Knapp DPM;  Location:  MAIN OR;  Service: Podiatry   • JOINT REPLACEMENT Left 03/11/2022    Left TSA   • [place] ARTHRODESIS POSTERIOR/PSTLAT TQ 1NTRSPC LUMBAR Bilateral 04/11/2023    Procedure: L1-S1 navigated posterior decompression with instrumented fixation fusion;  Surgeon: Le Steele MD;  Location:  MAIN OR;  Service: Neurosurgery   • THYROID SURGERY  2021    remove cancer       Past Family History  Family History   Problem Relation Age of Onset   • Colon cancer Neg Hx        Past Social history  Social History     Socioeconomic History   • Marital status: /Civil Union     Spouse name: Not on file   • Number of children: Not on file   • Years of education: Not on file   • Highest education level: Not on file   Occupational History   • Not on file   Tobacco Use   • Smoking status: Former     Packs/day: 0 25     Types: Cigarettes     Quit date:      Years since quittin 3   • Smokeless tobacco: Never   • Tobacco comments:     quit    Vaping Use   • Vaping Use: Never used   Substance and Sexual Activity   • Alcohol use: Yes     Comment: Socially   • Drug use: Yes     Frequency: 7 0 times per week     Types: Marijuana     Comment: Medical   • Sexual activity: Not Currently   Other Topics Concern   • Not on file   Social History Narrative   • Not on file     Social Determinants of Health     Financial Resource Strain: Not on file   Food Insecurity: No Food Insecurity   • Worried About Running Out of Food in the Last Year: Never true   • Ran Out of Food in the Last Year: Never true   Transportation Needs: No Transportation Needs   • Lack of Transportation (Medical): No   • Lack of Transportation (Non-Medical):  No   Physical Activity: Not on file   Stress: Not on file   Social Connections: Not on file   Intimate Partner Violence: Not on file   Housing Stability: Low Risk    • Unable to Pay for Housing in the Last Year: No   • Number of Places Lived in the Last Year: 1   • Unstable Housing in the Last Year: No       Current Medications  Current Outpatient Medications   Medication Sig Dispense Refill   • ACCU-CHEK FABIANO PLUS test strip 4 (four) times a day  1   • ACCU-CHEK FASTCLIX LANCETS MISC 4 (four) times a day  1   • acetaminophen (TYLENOL) 500 mg tablet Take 1 tablet (500 mg total) by mouth every 6 (six) hours as needed for mild pain 500 mg tablet  0   • CHOLECALCIFEROL PO Take 5,000 Units by mouth daily     • clonazePAM (KlonoPIN) 1 mg tablet Take 1 mg by mouth 2 (two) times a day     • docusate sodium (COLACE) 100 mg capsule Take 1 capsule (100 mg total) by mouth 2 (two) times a day  0   • fluticasone (FLONASE) 50 mcg/act nasal spray 1 spray into each nostril 2 (two) times a day     • folic acid (FOLVITE) 1 mg tablet Take 2,000 mcg by mouth daily  6   • gabapentin "(NEURONTIN) 300 mg capsule Take 1 capsule (300 mg total) by mouth 3 (three) times a day 45 capsule 0   • GLOBAL INJECT EASE INSULIN SYR 31G X 5/16\" 1 ML MISC 2 (two) times a day  0   • GNP Aspirin Low Dose 81 MG EC tablet Take 1 tablet (81 mg total) by mouth daily Do not start before April 25, 2023   0   • hydrOXYzine pamoate (VISTARIL) 50 mg capsule Take 50 mg by mouth daily at bedtime     • Jardiance 10 MG TABS Take 10 mg by mouth every morning     • ketoconazole (NIZORAL) 2 % shampoo Apply 1 application  topically daily Use as directed  6   • Lantus SoloStar 100 units/mL SOPN Inject 30 Units under the skin every morning     • losartan-hydrochlorothiazide (HYZAAR) 100-25 MG per tablet Take 1 tablet by mouth every morning  0   • lovastatin (MEVACOR) 20 mg tablet Take 20 mg by mouth every morning  3   • metFORMIN (GLUCOPHAGE) 1000 MG tablet Take 1,000 mg by mouth 2 (two) times a day with meals      • methocarbamol (ROBAXIN) 750 mg tablet Take 1 tablet (750 mg total) by mouth every 6 (six) hours 45 tablet 0   • METOPROLOL SUCCINATE ER PO Take 75 mg by mouth every morning     • mirtazapine (REMERON) 45 MG tablet Take 45 mg by mouth daily at bedtime  1   • NIFEdipine (PROCARDIA XL) 30 mg 24 hr tablet Take 120 mg by mouth every morning Takes 90 mg and 30 mg =120 mg every AM     • NIFEdipine (PROCARDIA XL) 90 mg 24 hr tablet Take 120 mg by mouth every morning Takes 90 mg and 30 mg =120 mg every AM  3   • polyethylene glycol (Golytely) 4000 mL solution Take 4,000 mL by mouth once for 1 dose Take 4000 mL by mouth once for 1 dose  Use as directed 4000 mL 0   • polyethylene glycol (MIRALAX) 17 g packet Take 17 g by mouth 2 (two) times a day  0   • sertraline (ZOLOFT) 100 mg tablet Take 100 mg by mouth every morning     • tamsulosin (FLOMAX) 0 4 mg Take 1 capsule (0 4 mg total) by mouth daily with dinner 15 capsule 0     No current facility-administered medications for this visit         Allergies  Allergies   Allergen " Reactions   • Abilify [Aripiprazole] Tremor     Shaking     • Cephalexin Rash   • Molds & Smuts Allergic Rhinitis   • Pregabalin Tremor     Lyrica - shaking feeling       Past Medical History, Social History, Family History, medications and allergies were reviewed  Vitals  There were no vitals filed for this visit  Physical Exam  Physical Exam  Constitutional:       Appearance: Normal appearance  He is well-developed  HENT:      Head: Normocephalic  Eyes:      Pupils: Pupils are equal, round, and reactive to light  Pulmonary:      Effort: Pulmonary effort is normal    Abdominal:      Palpations: Abdomen is soft  Genitourinary:     Comments: No suprapubic discomfort or distention  Musculoskeletal:         General: Normal range of motion  Cervical back: Normal range of motion  Comments: Wheelchair-bound, assist x2   Skin:     General: Skin is warm and dry  Neurological:      General: No focal deficit present  Mental Status: He is alert and oriented to person, place, and time  Psychiatric:         Mood and Affect: Mood normal          Behavior: Behavior normal          Thought Content: Thought content normal          Judgment: Judgment normal          Results    I have personally reviewed all pertinent lab results and reviewed with patient  No results found for: PSA  Lab Results   Component Value Date    CALCIUM 8 6 04/22/2023    K 3 2 (L) 04/22/2023    CO2 28 04/22/2023    CL 98 04/22/2023    BUN 9 04/22/2023    CREATININE 0 79 04/22/2023     Lab Results   Component Value Date    WBC 11 25 (H) 04/22/2023    HGB 7 9 (L) 04/22/2023    HCT 24 9 (L) 04/22/2023    MCV 87 04/22/2023     (H) 04/22/2023     No results found for this or any previous visit (from the past 1 hour(s))

## 2023-05-16 ENCOUNTER — OFFICE VISIT (OUTPATIENT)
Dept: UROLOGY | Facility: AMBULATORY SURGERY CENTER | Age: 68
End: 2023-05-16

## 2023-05-16 VITALS
OXYGEN SATURATION: 94 % | SYSTOLIC BLOOD PRESSURE: 148 MMHG | RESPIRATION RATE: 18 BRPM | HEART RATE: 104 BPM | DIASTOLIC BLOOD PRESSURE: 68 MMHG

## 2023-05-16 DIAGNOSIS — N40.1 BENIGN PROSTATIC HYPERPLASIA WITH LOWER URINARY TRACT SYMPTOMS, SYMPTOM DETAILS UNSPECIFIED: ICD-10-CM

## 2023-05-16 DIAGNOSIS — Z12.5 SCREENING FOR PROSTATE CANCER: ICD-10-CM

## 2023-05-16 DIAGNOSIS — R33.9 URINARY RETENTION: Primary | ICD-10-CM

## 2023-05-16 LAB — POST-VOID RESIDUAL VOLUME, ML POC: 319 ML

## 2023-05-16 RX ORDER — DOXYCYCLINE 100 MG/1
CAPSULE ORAL
COMMUNITY
Start: 2023-04-22

## 2023-05-16 RX ORDER — METOPROLOL SUCCINATE 50 MG/1
TABLET, EXTENDED RELEASE ORAL
COMMUNITY
Start: 2023-02-18

## 2023-05-17 ENCOUNTER — TRANSCRIBE ORDERS (OUTPATIENT)
Dept: NEUROSURGERY | Facility: CLINIC | Age: 68
End: 2023-05-17

## 2023-05-17 ENCOUNTER — NURSE TRIAGE (OUTPATIENT)
Dept: OTHER | Facility: OTHER | Age: 68
End: 2023-05-17

## 2023-05-17 ENCOUNTER — TELEPHONE (OUTPATIENT)
Dept: NEUROSURGERY | Facility: CLINIC | Age: 68
End: 2023-05-17

## 2023-05-17 DIAGNOSIS — M47.816 LUMBAR SPONDYLOSIS: Primary | ICD-10-CM

## 2023-05-17 NOTE — TELEPHONE ENCOUNTER
"Pt has slight bleeding at insertion site yesterday  Pt cleaned area and applied Vaseline and no further bleeding has occurred  Pt denies pain, fever and all other complaints and states catheter is draining \"normal\" yellow urine well     "

## 2023-05-17 NOTE — TELEPHONE ENCOUNTER
"  Reason for Disposition  • Urinary catheter care, questions about    Answer Assessment - Initial Assessment Questions  1  SYMPTOMS: \"What symptoms are you concerned about? \"      Slight dark blood yesterday at insertion site  Pt cleaned and put vaseline around tubing and it has resolved  2  ONSET:  \"When did the symptoms start? \"     yesterday  3  FEVER: \"Is there a fever? \" If Yes, ask: \"What is the temperature, how was it measured, and when did it start? \"      No fever  4  ABDOMINAL PAIN: \"Is there any abdominal pain? \" (e g , Scale 1-10; or mild, moderate, severe)      No pain  5  URINE COLOR: \"What color is the urine? \"  \"Is there blood present in the urine? \" (e g , clear, yellow, cloudy, tea-colored, blood streaks, bright red)      \"normal\" yellow urine  6  ONSET: \"When was the catheter inserted? \"      yesterday  7  OTHER SYMPTOMS: \"Do you have any other symptoms? \" (e g , back pain, bad urine odor)       There were white crystals in tubing yesterday but not now    Protocols used: URINARY CATHETER SYMPTOMS AND QUESTIONS-ADULT-    "

## 2023-05-17 NOTE — TELEPHONE ENCOUNTER
Regarding:  There're white crystal pebble laying in the hose of my  cathter & small amount of bleeding  ----- Message from Chula Landis sent at 5/17/2023  5:36 PM EDT -----  '' There are white crystal pebble that are laying in the hose of my  cathter and he was having a small amount of bleeding from the hole where the cathter was put in ''

## 2023-05-24 ENCOUNTER — OFFICE VISIT (OUTPATIENT)
Dept: NEUROSURGERY | Facility: CLINIC | Age: 68
End: 2023-05-24

## 2023-05-24 ENCOUNTER — HOSPITAL ENCOUNTER (OUTPATIENT)
Dept: RADIOLOGY | Facility: HOSPITAL | Age: 68
Discharge: HOME | End: 2023-05-24

## 2023-05-24 ENCOUNTER — APPOINTMENT (OUTPATIENT)
Dept: RADIOLOGY | Facility: HOSPITAL | Age: 68
End: 2023-05-24

## 2023-05-24 VITALS
BODY MASS INDEX: 26.83 KG/M2 | TEMPERATURE: 97.8 F | RESPIRATION RATE: 16 BRPM | SYSTOLIC BLOOD PRESSURE: 102 MMHG | HEIGHT: 68 IN | DIASTOLIC BLOOD PRESSURE: 58 MMHG | WEIGHT: 177 LBS | HEART RATE: 82 BPM

## 2023-05-24 DIAGNOSIS — M47.816 LUMBAR SPONDYLOSIS: ICD-10-CM

## 2023-05-24 DIAGNOSIS — Z98.1 STATUS POST LUMBAR SPINAL FUSION: Primary | ICD-10-CM

## 2023-05-24 DIAGNOSIS — Z98.1 STATUS POST LUMBAR SPINAL FUSION: ICD-10-CM

## 2023-05-24 PROBLEM — M48.062 SPINAL STENOSIS OF LUMBAR REGION WITH NEUROGENIC CLAUDICATION: Status: RESOLVED | Noted: 2023-01-17 | Resolved: 2023-05-24

## 2023-05-24 NOTE — PROGRESS NOTES
Office Note - Neurosurgery   Shannon Rojas 74 y o  male MRN: [id number]      Assessment:    Patient is stable  42-year-old gentleman post navigated lumbar decompression and fusion procedure  Patient appears to be at his neurological baseline with regards to gait and balance  He currently has a Mendoza catheter in place and is being followed by urology  He feels that his back pain is worse compared to surgery while his leg pain is stable  Incision is well-healed  No imaging available today for review  Inquired about a new prescription for Dilaudid  I explained that this office does not provide pain medication 6 weeks after surgical intervention  Would recommend he continue to try to optimize over-the-counter pain medications and topical analgesics and may follow further with his PCP  He will be starting physical therapy soon which hopefully will improve some of his pain symptoms  He will try to obtain plain films of the lumbar spine in the next few days for review  Otherwise he will follow-up for shared visit in 3 months time with repeat plain films to check on his progress  History, physical examination and diagnostic tests were reviewed and questions answered  Diagnosis, care plan and treatment options were discussed  The patient and spouse/SO understand instructions and will follow up as directed  Plan:    Follow-up: 3 months    Problem List Items Addressed This Visit        Other    Status post lumbar spinal fusion - Primary    Relevant Orders    XR spine lumbar 2 or 3 views injury       Subjective/Objective     Chief Complaint    Follow-up post lumbar decompression fusion  HPI    Pleasant 42-year-old gentleman accompanied by his wife  He is status post L1-S1 navigated decompression and fusion  The patient's wife feels that his gait is stable compared to prior to surgery  He currently has a Mendoza catheter in place and is being followed up in urology    The patient himself describes more back pain than he had prior to surgery  He is still ambulating with a cane and on occasion feels as if his left leg has poor control  Over-the-counter pain medications are not particularly helpful  He is scheduled to start physical therapy, but must first speak with his insurance  He was not able to obtain a plain film of the lumbar spine prior to today's visit  EDITH SHARMA personally reviewed and updated  Review of Systems   Constitutional: Positive for appetite change (getting better)  HENT: Negative  Eyes: Negative  Respiratory: Negative  Cardiovascular: Negative  Gastrointestinal: Negative  Some pain with BM, hurts around coccyx to sit   Endocrine: Negative  Genitourinary: Negative  Occasional urinary hesitacy   Musculoskeletal: Positive for back pain (constant low back pain, radiates up spine and wraps around to front  Occasionally radiates to buttocks and sometimes posterior legs), gait problem (using cane, no recent falls ), myalgias (muscle pain/tightness/spams in back ) and neck pain (sometimes feels a little pain )  6 WK POV,  L1-S1 JANY POST DECOMP/FUSION- W XRAY     rates pain today 10/10 MID to LBP w occasional b/l Leg pain   6 WK POV,  L1-S1 JANY POST DECOMP/FUSION- W XRAY   rates pain today 10/10 MID to LBP w occasional b/l Leg pain   patient is asking for Diluadi refill   pt did not complete xray    Skin: Negative  Allergic/Immunologic: Negative  Neurological: Positive for weakness (sometimes in BLE, L>R) and numbness (sometimes in his feet from neuropathy  and sometimes in his arms )  Hematological: Negative  Psychiatric/Behavioral: Positive for sleep disturbance (pain keeping him up at night)  All other systems reviewed and are negative        Family History    Family History   Problem Relation Age of Onset   • Colon cancer Neg Hx        Social History    Social History     Socioeconomic History   • Marital status: /Civil Union Spouse name: Not on file   • Number of children: Not on file   • Years of education: Not on file   • Highest education level: Not on file   Occupational History   • Not on file   Tobacco Use   • Smoking status: Former     Packs/day: 0 25     Types: Cigarettes     Quit date: 0     Years since quittin 4   • Smokeless tobacco: Never   • Tobacco comments:     quit 1981   Vaping Use   • Vaping Use: Never used   Substance and Sexual Activity   • Alcohol use: Yes     Comment: Socially   • Drug use: Not Currently     Frequency: 7 0 times per week     Types: Marijuana     Comment: Medical   • Sexual activity: Not Currently   Other Topics Concern   • Not on file   Social History Narrative   • Not on file     Social Determinants of Health     Financial Resource Strain: Not on file   Food Insecurity: No Food Insecurity (2023)    Hunger Vital Sign    • Worried About Running Out of Food in the Last Year: Never true    • Ran Out of Food in the Last Year: Never true   Transportation Needs: No Transportation Needs (2023)    PRAPARE - Transportation    • Lack of Transportation (Medical): No    • Lack of Transportation (Non-Medical):  No   Physical Activity: Not on file   Stress: Not on file   Social Connections: Not on file   Intimate Partner Violence: Not on file   Housing Stability: Low Risk  (2023)    Housing Stability Vital Sign    • Unable to Pay for Housing in the Last Year: No    • Number of Places Lived in the Last Year: Yes    • Unstable Housing in the Last Year: 2       Past Medical History    Past Medical History:   Diagnosis Date   • Anxiety    • Arthritis    • Cancer ([facility] )    • Depression    • Diabetes mellitus ([facility] )    • Hypertension    • Liver disease    • Mitral valve prolapse    • PONV (postoperative nausea and vomiting)    • Thyroid disease        Surgical History    Past Surgical History:   Procedure Laterality Date   • COLONOSCOPY     • INCISION AND DRAINAGE OF WOUND Left 2022 "Procedure: INCISION AND DRAINAGE (I&D) EXTREMITY;  Surgeon: Yvette Sadler DPM;  Location:  MAIN OR;  Service: Podiatry   • JOINT REPLACEMENT Left 03/11/2022    Left TSA   • [place] ARTHRODESIS POSTERIOR/PSTLAT TQ 1NTRSPC LUMBAR Bilateral 04/11/2023    Procedure: L1-S1 navigated posterior decompression with instrumented fixation fusion;  Surgeon: Reyna Young MD;  Location:  MAIN OR;  Service: Neurosurgery   • THYROID SURGERY  2021    remove cancer       Medications      Current Outpatient Medications:   •  ACCU-CHEK FABIANO PLUS test strip, 4 (four) times a day, Disp: , Rfl: 1  •  ACCU-CHEK FASTCLIX LANCETS MISC, 4 (four) times a day, Disp: , Rfl: 1  •  acetaminophen (TYLENOL) 500 mg tablet, Take 1 tablet (500 mg total) by mouth every 6 (six) hours as needed for mild pain 500 mg tablet, Disp: , Rfl: 0  •  CHOLECALCIFEROL PO, Take 5,000 Units by mouth daily, Disp: , Rfl:   •  clonazePAM (KlonoPIN) 1 mg tablet, Take 1 mg by mouth 2 (two) times a day & 3rd pill PRN, Disp: , Rfl:   •  docusate sodium (COLACE) 100 mg capsule, Take 1 capsule (100 mg total) by mouth 2 (two) times a day (Patient taking differently: Take 100 mg by mouth 2 (two) times a day PRN), Disp: , Rfl: 0  •  fluticasone (FLONASE) 50 mcg/act nasal spray, 1 spray into each nostril 2 (two) times a day, Disp: , Rfl:   •  folic acid (FOLVITE) 1 mg tablet, Take 2,000 mcg by mouth daily, Disp: , Rfl: 6  •  GLOBAL INJECT EASE INSULIN SYR 31G X 5/16\" 1 ML MISC, 2 (two) times a day, Disp: , Rfl: 0  •  GNP Aspirin Low Dose 81 MG EC tablet, Take 1 tablet (81 mg total) by mouth daily Do not start before April 25, 2023 , Disp: , Rfl: 0  •  hydrOXYzine pamoate (VISTARIL) 50 mg capsule, Take 50 mg by mouth daily at bedtime, Disp: , Rfl:   •  Jardiance 10 MG TABS, Take 10 mg by mouth every morning, Disp: , Rfl:   •  ketoconazole (NIZORAL) 2 % shampoo, Apply 1 application   topically daily Use as directed, Disp: , Rfl: 6  •  Lantus SoloStar 100 units/mL SOPN, Inject 30 " Units under the skin every morning, Disp: , Rfl:   •  losartan-hydrochlorothiazide (HYZAAR) 100-25 MG per tablet, Take 1 tablet by mouth every morning, Disp: , Rfl: 0  •  lovastatin (MEVACOR) 20 mg tablet, Take 20 mg by mouth every morning, Disp: , Rfl: 3  •  metFORMIN (GLUCOPHAGE) 1000 MG tablet, Take 1,000 mg by mouth 2 (two) times a day with meals , Disp: , Rfl:   •  methocarbamol (ROBAXIN) 750 mg tablet, Take 1 tablet (750 mg total) by mouth every 6 (six) hours, Disp: 45 tablet, Rfl: 0  •  metoprolol succinate (TOPROL-XL) 50 mg 24 hr tablet, TAKE 1 AND 1/2 tabkets BY MOUTH ONCE A DAY, Disp: , Rfl:   •  mirtazapine (REMERON) 45 MG tablet, Take 45 mg by mouth daily at bedtime, Disp: , Rfl: 1  •  NIFEdipine (PROCARDIA XL) 30 mg 24 hr tablet, Take 120 mg by mouth every morning Takes 90 mg and 30 mg =120 mg every AM, Disp: , Rfl:   •  NIFEdipine (PROCARDIA XL) 90 mg 24 hr tablet, Take 120 mg by mouth every morning Takes 90 mg and 30 mg =120 mg every AM, Disp: , Rfl: 3  •  polyethylene glycol (MIRALAX) 17 g packet, Take 17 g by mouth 2 (two) times a day (Patient taking differently: Take 17 g by mouth 2 (two) times a day PRN), Disp: , Rfl: 0  •  sertraline (ZOLOFT) 100 mg tablet, Take 100 mg by mouth every morning, Disp: , Rfl:   •  tamsulosin (FLOMAX) 0 4 mg, Take 1 capsule (0 4 mg total) by mouth daily with dinner, Disp: 15 capsule, Rfl: 0  •  doxycycline monohydrate (MONODOX) 100 mg capsule, TAKE 1 CAPSULE BY MOUTH TWICE DAILY FOR FOURTEEN DAYS (Patient not taking: Reported on 5/24/2023), Disp: , Rfl:   •  gabapentin (NEURONTIN) 300 mg capsule, Take 1 capsule (300 mg total) by mouth 3 (three) times a day (Patient not taking: Reported on 5/24/2023), Disp: 45 capsule, Rfl: 0  •  METOPROLOL SUCCINATE ER PO, Take 75 mg by mouth every morning, Disp: , Rfl:   •  polyethylene glycol (Golytely) 4000 mL solution, Take 4,000 mL by mouth once for 1 dose Take 4000 mL by mouth once for 1 dose   Use as directed, Disp: 4000 mL, "Rfl: 0    Allergies    Allergies   Allergen Reactions   • Abilify [Aripiprazole] Tremor     Shaking     • Cephalexin Rash   • Molds & Smuts Allergic Rhinitis   • Pregabalin Tremor     Lyrica - shaking feeling       The following portions of the patient's history were reviewed and updated as appropriate: allergies, current medications, past family history, past medical history, past social history, past surgical history and problem list     Physical Exam    Vitals:  Blood pressure 102/58, pulse 82, temperature 97 8 °F (36 6 °C), temperature source Temporal, resp  rate 16, height 5' 8\" (1 727 m), weight 80 3 kg (177 lb)  ,Body mass index is 26 91 kg/m²  Physical Exam  Vitals reviewed  Constitutional:       General: He is not in acute distress  Eyes:      Extraocular Movements: Extraocular movements intact  Pulmonary:      Effort: Pulmonary effort is normal  No respiratory distress  Skin:     General: Skin is warm and dry  Comments: Lumbar incision well-healed  Neurological:      Mental Status: He is alert  Comments: Stands from seated position slowly using a cane  Walks with a somewhat unsteady wide-based gait with cane in the right hand  Mildly stooped forward posture  Left leg appears to drag on occasion but patient is able to compensate     Psychiatric:         Mood and Affect: Mood normal          Behavior: Behavior normal        Neurologic Exam  "

## 2023-06-07 ENCOUNTER — TELEPHONE (OUTPATIENT)
Dept: NEUROSURGERY | Facility: CLINIC | Age: 68
End: 2023-06-07

## 2023-06-07 DIAGNOSIS — Z98.1 STATUS POST LUMBAR SPINAL FUSION: Primary | ICD-10-CM

## 2023-06-07 DIAGNOSIS — Z98.890 POST-OPERATIVE STATE: ICD-10-CM

## 2023-06-07 NOTE — TELEPHONE ENCOUNTER
Received a call from [facility] requesting new referral be placed for Home Health services  She states they work with [facility] but since he had surgery will need a new referral  Replaced this order and faxed as requested to [phone]  Contacted Tatyana back to advise

## 2023-06-12 ENCOUNTER — APPOINTMENT (OUTPATIENT)
Dept: RADIOLOGY | Facility: CLINIC | Age: 68
End: 2023-06-12
Payer: COMMERCIAL

## 2023-06-12 DIAGNOSIS — M25.572 LEFT ANKLE PAIN, UNSPECIFIED CHRONICITY: ICD-10-CM

## 2023-06-12 DIAGNOSIS — M79.672 LEFT FOOT PAIN: ICD-10-CM

## 2023-06-12 PROCEDURE — 73630 X-RAY EXAM OF FOOT: CPT

## 2023-06-12 PROCEDURE — 73610 X-RAY EXAM OF ANKLE: CPT

## 2023-06-13 ENCOUNTER — TELEPHONE (OUTPATIENT)
Dept: NEUROSURGERY | Facility: CLINIC | Age: 68
End: 2023-06-13

## 2023-06-13 NOTE — TELEPHONE ENCOUNTER
Received message from spouse requested last office notes/ surgery notes including meds list faxed to [facility] at [phone]    Called spouse and informed her that I faxed the requested information

## 2023-06-18 PROBLEM — A41.9 SEPSIS (HCC): Status: RESOLVED | Noted: 2022-08-09 | Resolved: 2023-06-18

## 2023-06-27 ENCOUNTER — PROCEDURE VISIT (OUTPATIENT)
Dept: UROLOGY | Facility: MEDICAL CENTER | Age: 68
End: 2023-06-27
Payer: COMMERCIAL

## 2023-06-27 DIAGNOSIS — R33.9 URINARY RETENTION: Primary | ICD-10-CM

## 2023-06-27 LAB
SL AMB  POCT GLUCOSE, UA: NORMAL
SL AMB LEUKOCYTE ESTERASE,UA: NORMAL
SL AMB POCT BILIRUBIN,UA: NORMAL
SL AMB POCT BLOOD,UA: NORMAL
SL AMB POCT CLARITY,UA: CLEAR
SL AMB POCT COLOR,UA: YELLOW
SL AMB POCT KETONES,UA: NORMAL
SL AMB POCT NITRITE,UA: POSITIVE
SL AMB POCT PH,UA: 5.5
SL AMB POCT SPECIFIC GRAVITY,UA: <=1.005
SL AMB POCT URINE PROTEIN: NORMAL
SL AMB POCT UROBILINOGEN: 0.2

## 2023-06-27 PROCEDURE — 81003 URINALYSIS AUTO W/O SCOPE: CPT

## 2023-06-27 PROCEDURE — 51702 INSERT TEMP BLADDER CATH: CPT

## 2023-06-27 PROCEDURE — 99211 OFF/OP EST MAY X REQ PHY/QHP: CPT

## 2023-06-27 NOTE — PROGRESS NOTES
6/27/2023  Jaylon Curtis is a 74 y o  male  [id number]    Diagnosis:      Patient presents for ball catheter change managed by our office and the AP team     Plan:  Return to the office in 5 weeks for cysto/TRUS with MD   Patient instructed to call with any questions or concerns in the meantime  Orders Placed This Encounter   Procedures   • POCT urine dip auto non-scope        Vitals:           Procedure:    Universal Protocol:  Consent: Verbal consent obtained  Risks and benefits: risks, benefits and alternatives were discussed  Consent given by: patient  Patient understanding: patient states understanding of the procedure being performed  Patient identity confirmed: verbally with patient      Bladder catheterization    Date/Time: 6/27/2023 2:20 PM    Performed by: Marcelina Padilla RN  Authorized by: Maria C Emmanuel MD    Patient location:  Bedside  Consent:     Consent given by:  Patient  Universal protocol:     Procedure explained and questions answered to patient or proxy's satisfaction: yes      Patient identity confirmed:  Verbally with patient  Pre-procedure details:     Procedure purpose:  Therapeutic    Preparation: Patient was prepped and draped in usual sterile fashion    Anesthesia (see MAR for exact dosages): Anesthesia method:  Topical application    Topical anesthetic:  Lidocaine gel  Procedure details:     Bladder irrigation: no      Catheter insertion:  Temporary indwelling    Approach: natural orifice      Catheter type:  Coude, Ball and latex    Catheter size:  16 Fr    Number of attempts:  1    Successful placement: yes      Urine characteristics:  Clear and yellow  Post-procedure details:     Patient tolerance of procedure: Tolerated well, no immediate complications  Comments:      Pt presented to the office today for cystoscopy with Dr Danielle Pinon   Per the MD recommendations, cystoscopy was not performed based on results of urine dip that was done in the office collected by the medical "assistant  MD instructed nurse that ball catheter should be changed in the office and office procedure be rescheduled at next available  Spoke with patient and informed him that nurse would be changing his ball catheter today and cysto would need to be rescheduled based on results of his urine testing done in the office  Pt understands and asked if he will be placed on antibiotics and if urine will be sent for further testing based on results  Dr Cheryl Wilder declined to send urine out for culture and stated patient \"does not need antibiotics at this time\"  Previous catheter was removed after deflation of intact balloon  Insertion site was cleansed and prepped  #16fr coude catheter inserted and balloon was filled with 10cc of sterile water  Catheter was drained for yellow urine  Catheter was attached to leg drainage bag and patient tolerated well  Pt is scheduled for cysto/TRUS in August with next available MD  He is aware he can contact the office with any further questions or concerns at this time                   Ya Delgado RN  "

## 2023-06-28 ENCOUNTER — TELEPHONE (OUTPATIENT)
Dept: UROLOGY | Facility: AMBULATORY SURGERY CENTER | Age: 68
End: 2023-06-28

## 2023-06-28 ENCOUNTER — TELEPHONE (OUTPATIENT)
Dept: UROLOGY | Facility: MEDICAL CENTER | Age: 68
End: 2023-06-28

## 2023-06-28 DIAGNOSIS — R39.89 SUSPECTED UTI: Primary | ICD-10-CM

## 2023-06-28 NOTE — TELEPHONE ENCOUNTER
Patient's wife calling with a question for Dr Cullen Sigala after yesterday's visit  She stated her  is diabetic and she is wanting to make sure it is safe that he is not being put on an antibiotic for his UTI    He is not currently having any symptoms    She is requesting a call back at [phone]

## 2023-06-28 NOTE — TELEPHONE ENCOUNTER
Patient with urinary retention, ball catheter currently in place  Was scheduled yesterday in the [facility] office with Dr Suzy Benavidez for cystoscopy visit  Urine dip prior to procedure was positive for nitrites  Cystoscopy not performed and patient switched to nurse schedule for ball catheter change due to concern for UTI  Patient questioned need for urine culture and/or antibiotics with nurse, both were declined by doctor  Called and spoke with patient's wife Lo Elena  She states she is concerned about yesterday's visit  She states if her  has an infection she would like it to be treated as he is diabetic and has other issues  She is worried about sepsis  Reviewed I can place order for urine culture to be done today, to treat appropriately for infection if present  Advised since patient has ball catheter, ball should be plugged for 30-60 mins and sample to be given directly from catheter versus a drainage bag  She verbalized understanding on urine culture collection  Advised culture does take 2-3 days to result, myself or another clinical member will call when they become available  Currently patient not having any fever/chills or bladder spasms  Reviewed if he does become symptomatic she should call the office back to report  Patient's wife understood and was thankful for call back and acknowledgement of her concern

## 2023-06-29 ENCOUNTER — APPOINTMENT (OUTPATIENT)
Dept: LAB | Facility: HOSPITAL | Age: 68
End: 2023-06-29
Payer: COMMERCIAL

## 2023-06-29 DIAGNOSIS — R33.9 URINARY RETENTION: ICD-10-CM

## 2023-06-29 PROCEDURE — 87186 SC STD MICRODIL/AGAR DIL: CPT

## 2023-06-29 PROCEDURE — 87086 URINE CULTURE/COLONY COUNT: CPT

## 2023-06-30 NOTE — TELEPHONE ENCOUNTER
Email received from Dr Suzanne Araujo, plan for patient to be switched onto his schedule in the next two weeks for cysto/TRUS  Called and spoke with patient's wife  She was offered sooner ppt next Friday, 7/7  She states she cant make that, she only has off Tuesdays if we could accommodate  Patient scheduled for 7/18 with Dr Suzanne Araujo in [place]  Office address and directions provided  She was thankful for sooner appt  Reviewed urine culture is still pending, I would call Monday with results and treatment if needed  Preferred pharmacy confirmed as [address] [place] [address]

## 2023-07-01 LAB
BACTERIA UR CULT: ABNORMAL
BACTERIA UR CULT: ABNORMAL

## 2023-07-03 DIAGNOSIS — N30.00 ACUTE CYSTITIS WITHOUT HEMATURIA: Primary | ICD-10-CM

## 2023-07-03 RX ORDER — SULFAMETHOXAZOLE AND TRIMETHOPRIM 800; 160 MG/1; MG/1
1 TABLET ORAL EVERY 12 HOURS SCHEDULED
Qty: 4 TABLET | Refills: 0 | Status: SHIPPED | OUTPATIENT
Start: 2023-07-03 | End: 2023-07-05

## 2023-07-03 RX ORDER — SULFAMETHOXAZOLE AND TRIMETHOPRIM 800; 160 MG/1; MG/1
1 TABLET ORAL EVERY 12 HOURS SCHEDULED
Qty: 10 TABLET | Refills: 0 | Status: SHIPPED | OUTPATIENT
Start: 2023-07-03 | End: 2023-07-08

## 2023-07-03 NOTE — TELEPHONE ENCOUNTER
Please let patient know antibiotics were sent to his pharmacy to begin today.   After completing this the second prescription will begin the day before his cystoscopy

## 2023-07-05 ENCOUNTER — TELEPHONE (OUTPATIENT)
Dept: GASTROENTEROLOGY | Facility: CLINIC | Age: 68
End: 2023-07-05

## 2023-07-05 NOTE — TELEPHONE ENCOUNTER
Procedure confirmed  Colonoscopy/Endoscopy     Via: Spoke with patient. Instructions given: Given to Patient at Visit     Prep Given: Golytely    Call the office if there are any questions.

## 2023-07-10 ENCOUNTER — PATIENT MESSAGE (OUTPATIENT)
Dept: NEUROSURGERY | Facility: CLINIC | Age: 68
End: 2023-07-10

## 2023-07-10 DIAGNOSIS — G89.29 CHRONIC BILATERAL LOW BACK PAIN WITHOUT SCIATICA: ICD-10-CM

## 2023-07-10 DIAGNOSIS — M54.50 CHRONIC BILATERAL LOW BACK PAIN WITHOUT SCIATICA: ICD-10-CM

## 2023-07-10 DIAGNOSIS — Z98.1 STATUS POST LUMBAR SPINAL FUSION: Primary | ICD-10-CM

## 2023-07-10 NOTE — TELEPHONE ENCOUNTER
From: Niurka Mancilla  To: Angélica President  Sent: 7/10/2023 8:41 AM EDT  Subject: Niurka Hernández,  Since Juan‘s surgery on his back, he has been very unstable and his hips feel like they’re going to pop out. A lot of pain in his waist buttocks and down the left leg. Can you please send a script to [facility] for a back brace for Juan? He thinks that will help to support his back as he feels his back is going to give out.   Hope to hear from you soon  Sincerely,  Becky Orlando and Niurka Mancilla

## 2023-07-18 ENCOUNTER — PROCEDURE VISIT (OUTPATIENT)
Dept: UROLOGY | Facility: AMBULATORY SURGERY CENTER | Age: 68
End: 2023-07-18
Payer: COMMERCIAL

## 2023-07-18 ENCOUNTER — TELEPHONE (OUTPATIENT)
Dept: UROLOGY | Facility: AMBULATORY SURGERY CENTER | Age: 68
End: 2023-07-18

## 2023-07-18 VITALS
DIASTOLIC BLOOD PRESSURE: 68 MMHG | WEIGHT: 179 LBS | OXYGEN SATURATION: 99 % | HEIGHT: 68 IN | BODY MASS INDEX: 27.13 KG/M2 | SYSTOLIC BLOOD PRESSURE: 148 MMHG | HEART RATE: 85 BPM

## 2023-07-18 DIAGNOSIS — Z98.1 S/P LUMBAR FUSION: ICD-10-CM

## 2023-07-18 DIAGNOSIS — R33.9 URINARY RETENTION: Primary | ICD-10-CM

## 2023-07-18 PROCEDURE — 52000 CYSTOURETHROSCOPY: CPT | Performed by: UROLOGY

## 2023-07-18 RX ORDER — FINASTERIDE 5 MG/1
5 TABLET, FILM COATED ORAL DAILY
Qty: 90 TABLET | Refills: 3 | Status: SHIPPED | OUTPATIENT
Start: 2023-07-18

## 2023-07-18 RX ORDER — TAMSULOSIN HYDROCHLORIDE 0.4 MG/1
0.4 CAPSULE ORAL
Qty: 90 CAPSULE | Refills: 3 | Status: SHIPPED | OUTPATIENT
Start: 2023-07-18

## 2023-07-18 NOTE — PROGRESS NOTES
7/18/2023    Gary Yeung is a 79 y.o. male  [id number]    Diagnosis:      Patient presents for  Clean intermittent catheterization teaching managed by Dr. Cecilia Van:  · Patient provided with catheter samples and a copy of written instructions  · Patient will catheterize 4 x daily or every 6 hours  · Catheter ordered submitted today to [employer] for size 16F coude  · Patient knows to call the office with any concerns, problems with supplies or difficulty passing catheter. Vitals:    07/18/23 1351   BP: 148/68   Pulse: 85   SpO2: 99%   Weight: 81.2 kg (179 lb)   Height: 5' 8" (1.727 m)         Teaching:    Patient and his reviewed CIC instructions and then discussed with them. We went into the bathroom and patient was able to demonstrate self catherization and 225 ml of urine was expelled. He was pleased with the results and was comfortable performing.     Yogesh Fisher RN

## 2023-07-18 NOTE — PROGRESS NOTES
Cystoscopy     Date/Time 7/18/2023 2:00 PM     Performed by  Abner Parra MD   Authorized by Abner Parra MD         Corrie Zuniga is a 70-year-old male with a history of urinary retention and BPH. He has had a Mendoza catheter in place since he underwent major posterior spine surgery in April 2023. He recently had a CT scan of the abdomen pelvis performed in April 2023 which shows diffuse bladder wall thickening. Mendoza catheter was in place at that time. By my best estimate measurements of the prostate are 6.35 cm x 5.76 cm x 7.29 = prostate 140 g      Male cystoscopy procedure note:  Risk and benefits of flexible cystoscopy were discussed. Informed consent was obtained. The patient was placed in the supine position. His genitalia was prepped and draped in a sterile fashion. Viscous lidocaine jelly was instilled into the urethra and flexible cystoscopy was then performed. The urethra, prostatic urethra, and bladder were all thoroughly inspected. Marked lateral lobe coaptation of the prostate was appreciated. Retroflexion did not show a distinct median lobe. His bladder was filled with 500 cc and he was then able to void back almost 300 cc with a residual of approximately 200 cc              Impression: BPH with urinary retention/elevated residual    Plan: I recommend continuing tamsulosin. I will add finasteride. Patient was instructed on clean intermittent catheterization technique in the office today. I recommended he perform CIC 4 times per day after spontaneous voids and keep a voiding log. He will return in follow-up in the next few weeks to reassess. In the interim I am exploring HoLAP versus robotic simple prostatectomy with my partners.

## 2023-07-25 NOTE — TELEPHONE ENCOUNTER
Pt under care of: Dr Lorrie Queen [facility]    Pt left voicemail stating: Pt wife requesting call back regarding cath supplies just received for pt due to [address] sent different type of cath that was given at office     Pt can be reached at: [phone] (wife)

## 2023-07-25 NOTE — TELEPHONE ENCOUNTER
Called Tatyana back and Shantel Olson wants straight tip not coude - will addend script- faxed to [facility]

## 2023-08-10 ENCOUNTER — TELEPHONE (OUTPATIENT)
Dept: NEUROSURGERY | Facility: CLINIC | Age: 68
End: 2023-08-10

## 2023-08-10 DIAGNOSIS — Z98.1 STATUS POST LUMBAR SPINAL FUSION: Primary | ICD-10-CM

## 2023-08-10 DIAGNOSIS — M48.062 SPINAL STENOSIS OF LUMBAR REGION WITH NEUROGENIC CLAUDICATION: ICD-10-CM

## 2023-08-10 DIAGNOSIS — G89.29 CHRONIC BILATERAL LOW BACK PAIN WITHOUT SCIATICA: ICD-10-CM

## 2023-08-10 DIAGNOSIS — M47.816 LUMBAR SPONDYLOSIS: ICD-10-CM

## 2023-08-10 DIAGNOSIS — M54.16 LUMBAR RADICULOPATHY: ICD-10-CM

## 2023-08-10 DIAGNOSIS — F10.21 ALCOHOLISM IN REMISSION (HCC): ICD-10-CM

## 2023-08-10 DIAGNOSIS — M54.50 CHRONIC BILATERAL LOW BACK PAIN WITHOUT SCIATICA: ICD-10-CM

## 2023-08-10 NOTE — TELEPHONE ENCOUNTER
Received a call from [facility] requesting referral for OP PT as he has been discharged from [address] [place] [address] this and returned her call. Encouraged a call back with questions or concerns.

## 2023-08-15 ENCOUNTER — OFFICE VISIT (OUTPATIENT)
Dept: UROLOGY | Facility: CLINIC | Age: 68
End: 2023-08-15

## 2023-08-15 VITALS
SYSTOLIC BLOOD PRESSURE: 156 MMHG | HEIGHT: 68 IN | OXYGEN SATURATION: 98 % | BODY MASS INDEX: 27.22 KG/M2 | HEART RATE: 68 BPM | DIASTOLIC BLOOD PRESSURE: 62 MMHG

## 2023-08-15 DIAGNOSIS — R33.9 URINARY RETENTION: Primary | ICD-10-CM

## 2023-08-15 RX ORDER — IBUPROFEN 800 MG/1
800 TABLET ORAL EVERY 6 HOURS PRN
COMMUNITY

## 2023-08-15 NOTE — PROGRESS NOTES
Office Visit- Urology  [address] 1955 MRN: [id number]      Assessment/Discussion/Plan    79 y.o. male managed by     1. BPH with urinary retention  -140 g prostate  -On Flomax and finasteride. Continue medical regimen  -CIC 4 times a day after spontaneous voids and keep a voiding log -most values are between 50 cc and 150 cc after spontaneous urination. Bladder symptom diary scanned into chart to review  -Plan for CIC 3 times a day and as needed  -Patient would like to consider surgical intervention for BPH. 2.  Prostate cancer screening  -PSA not on file - obtain PSA. Patient to withhold from PSA for 2 weeks due to FRANCY today.   He is to avoid ejaculation, bike riding, motorcycle riding for 5 days prior to test.  Due to prostamegaly would expect some degree of PSA elevation.   -Prostate exam today with asymmetry of right lobe of prostate but prostate is soft with no induration or overt nodules  -If PSA comes back significantly elevated will consider obtainment of MRI of the prostate before proceeding with possible simple prostatectomy/HoLEP procedure        Chief Complaint:   Sung Guardado is a 79 y.o. male presenting to the office for a follow up visit regarding  BPH with urinary retention        Subjective    -80-year-old male with a history of urinary retention and BPH  -He had major spine surgery in April 2023 and subsequently developed urinary retention  -Underwent cystoscopy with Dr. Kurtis Jaimes on 7/18/2023 which at that time demonstrated a 140 g prostate with no bladder lesions  -Patient was instructed to continue with Flomax as well as add finasteride 5 mg daily to his medical regimen  -He was also instructed to self catheterize 4 times a day after spontaneous voids and to keep a voiding log  -Presents to the office today for post CIC teaching evaluation  -Dr. Kurtis Jaimes discussed option of robotic simple prostatectomy versus HoLEP  -Patient denies any problems with CIC at this point  -States that he sometimes does have experience of urgency  -Has been utilizing CIC for 4 times a day  -Denies any dysuria, gross hematuria, flank pain      ROS:   Review of Systems   Constitutional: Negative. Negative for chills, fatigue and fever. HENT: Negative. Respiratory: Negative for shortness of breath. Cardiovascular: Negative for chest pain. Gastrointestinal: Negative. Negative for abdominal pain. Endocrine: Negative. Musculoskeletal: Negative. Skin: Negative. Neurological: Negative. Negative for dizziness and light-headedness. Hematological: Negative. Psychiatric/Behavioral: Negative.           Past Medical History  Past Medical History:   Diagnosis Date   • Anxiety    • Arthritis    • Cancer ([address])    • Depression    • Diabetes mellitus ([address])    • Hypertension    • Liver disease    • Mitral valve prolapse    • PONV (postoperative nausea and vomiting)    • Thyroid disease        Past Surgical History  Past Surgical History:   Procedure Laterality Date   • COLONOSCOPY     • INCISION AND DRAINAGE OF WOUND Left 08/12/2022    Procedure: INCISION AND DRAINAGE (I&D) EXTREMITY;  Surgeon: Denys Dumas DPM;  Location:  MAIN OR;  Service: Podiatry   • JOINT REPLACEMENT Left 03/11/2022    Left TSA   • ME ARTHRODESIS POSTERIOR/PSTLAT TQ 1NTRSPC LUMBAR Bilateral 04/11/2023    Procedure: L1-S1 navigated posterior decompression with instrumented fixation fusion;  Surgeon: Curt Franco MD;  Location:  MAIN OR;  Service: Neurosurgery   • THYROID SURGERY  2021    remove cancer       Past Family History  Family History   Problem Relation Age of Onset   • No Known Problems Father    • No Known Problems Mother    • Colon cancer Neg Hx        Past Social history  Social History     Socioeconomic History   • Marital status: /Civil Union     Spouse name: Not on file   • Number of children: Not on file   • Years of education: Not on file   • Highest education level: Not on file   Occupational History   • Not on file   Tobacco Use   • Smoking status: Former     Packs/day: 0.25     Types: Cigarettes     Quit date:      Years since quittin.6   • Smokeless tobacco: Never   • Tobacco comments:     quit    Vaping Use   • Vaping Use: Some days   Substance and Sexual Activity   • Alcohol use: Yes     Comment: Socially   • Drug use: Yes     Frequency: 7.0 times per week     Types: Marijuana     Comment: Medical   • Sexual activity: Not Currently   Other Topics Concern   • Not on file   Social History Narrative   • Not on file     Social Determinants of Health     Financial Resource Strain: Not on file   Food Insecurity: No Food Insecurity (2023)    Hunger Vital Sign    • Worried About Running Out of Food in the Last Year: Never true    • Ran Out of Food in the Last Year: Never true   Transportation Needs: No Transportation Needs (2023)    PRAPARE - Transportation    • Lack of Transportation (Medical): No    • Lack of Transportation (Non-Medical):  No   Physical Activity: Not on file   Stress: Not on file   Social Connections: Not on file   Intimate Partner Violence: Not on file   Housing Stability: Low Risk  (2023)    Housing Stability Vital Sign    • Unable to Pay for Housing in the Last Year: No    • Number of Places Lived in the Last Year: 1    • Unstable Housing in the Last Year: No       Current Medications  Current Outpatient Medications   Medication Sig Dispense Refill   • ACCU-CHEK FABIANO PLUS test strip 4 (four) times a day  1   • ACCU-CHEK FASTCLIX LANCETS MISC 4 (four) times a day  1   • acetaminophen (TYLENOL) 500 mg tablet Take 1 tablet (500 mg total) by mouth every 6 (six) hours as needed for mild pain 500 mg tablet  0   • CHOLECALCIFEROL PO Take 5,000 Units by mouth daily     • clonazePAM (KlonoPIN) 1 mg tablet Take 1 mg by mouth 2 (two) times a day & 3rd pill PRN     • finasteride (PROSCAR) 5 mg tablet Take 1 tablet (5 mg total) by mouth daily 90 tablet 3   • fluticasone (FLONASE) 50 mcg/act nasal spray 1 spray into each nostril 2 (two) times a day     • folic acid (FOLVITE) 1 mg tablet Take 2,000 mcg by mouth daily  6   • GNP Aspirin Low Dose 81 MG EC tablet Take 1 tablet (81 mg total) by mouth daily Do not start before April 25, 2023.  0   • hydrOXYzine pamoate (VISTARIL) 50 mg capsule Take 50 mg by mouth daily at bedtime     • ibuprofen (MOTRIN) 800 mg tablet Take 800 mg by mouth every 6 (six) hours as needed for mild pain     • Jardiance 10 MG TABS Take 10 mg by mouth every morning     • ketoconazole (NIZORAL) 2 % shampoo Apply 1 application. topically daily Use as directed  6   • Lantus SoloStar 100 units/mL SOPN Inject 30 Units under the skin every morning     • losartan-hydrochlorothiazide (HYZAAR) 100-25 MG per tablet Take 1 tablet by mouth every morning  0   • lovastatin (MEVACOR) 20 mg tablet Take 20 mg by mouth every morning  3   • metFORMIN (GLUCOPHAGE) 1000 MG tablet Take 1,000 mg by mouth 2 (two) times a day with meals      • methocarbamol (ROBAXIN) 750 mg tablet Take 1 tablet (750 mg total) by mouth every 6 (six) hours 45 tablet 0   • METOPROLOL SUCCINATE ER PO Take 75 mg by mouth every morning     • mirtazapine (REMERON) 45 MG tablet Take 45 mg by mouth daily at bedtime  1   • NIFEdipine (PROCARDIA XL) 30 mg 24 hr tablet Take 120 mg by mouth every morning Takes 90 mg and 30 mg =120 mg every AM     • NIFEdipine (PROCARDIA XL) 90 mg 24 hr tablet Take 120 mg by mouth every morning Takes 90 mg and 30 mg =120 mg every AM  3   • polyethylene glycol (Golytely) 4000 mL solution Take 4,000 mL by mouth once for 1 dose Take 4000 mL by mouth once for 1 dose.  Use as directed 4000 mL 0   • sertraline (ZOLOFT) 100 mg tablet Take 100 mg by mouth every morning     • tamsulosin (FLOMAX) 0.4 mg Take 1 capsule (0.4 mg total) by mouth daily with dinner 90 capsule 3   • metoprolol succinate (TOPROL-XL) 50 mg 24 hr tablet TAKE 1 AND 1/2 tabkets BY MOUTH ONCE A DAY (Patient not taking: Reported on 8/15/2023)       No current facility-administered medications for this visit. Allergies  Allergies   Allergen Reactions   • Abilify [Aripiprazole] Tremor     Shaking     • Cephalexin Rash   • Molds & Smuts Allergic Rhinitis   • Pregabalin Tremor     Lyrica - shaking feeling       OBJECTIVE    Vitals   Vitals:    08/15/23 1415   BP: 156/62   BP Location: Right arm   Patient Position: Sitting   Cuff Size: Adult   Pulse: 68   SpO2: 98%   Height: 5' 8" (1.727 m)       PVR:    Physical Exam  Constitutional:       General: He is not in acute distress. Appearance: Normal appearance. He is normal weight. He is not ill-appearing or toxic-appearing. HENT:      Head: Normocephalic and atraumatic. Eyes:      Conjunctiva/sclera: Conjunctivae normal.   Cardiovascular:      Rate and Rhythm: Normal rate. Pulmonary:      Effort: Pulmonary effort is normal. No respiratory distress. Genitourinary:     Comments: -Prostate exam today with asymmetry of right lobe of prostate but prostate is soft with no induration or discrete nodules  Musculoskeletal:         General: Normal range of motion. Comments: Wearing a back brace   Skin:     General: Skin is warm and dry. Neurological:      General: No focal deficit present. Mental Status: He is alert and oriented to person, place, and time. Cranial Nerves: No cranial nerve deficit. Psychiatric:         Mood and Affect: Mood normal.         Behavior: Behavior normal.         Thought Content:  Thought content normal.          Labs:     Lab Results   Component Value Date    CREATININE 0.79 04/22/2023      Lab Results   Component Value Date    HGBA1C 6.3 (H) 03/14/2023     Lab Results   Component Value Date    CALCIUM 8.6 04/22/2023    K 3.2 (L) 04/22/2023    CO2 28 04/22/2023    CL 98 04/22/2023    BUN 9 04/22/2023    CREATININE 0.79 04/22/2023       I have personally reviewed all pertinent lab results and reviewed with patient    Imaging       Tin Cole Giorgio Gaxiola PA-C  Date: 8/15/2023 Time: 2:46 PM  [id number] [facility]    This note was written using fluency dictation software. Please excuse any resulting minor grammatical errors.

## 2023-08-17 ENCOUNTER — TELEPHONE (OUTPATIENT)
Dept: UROLOGY | Facility: CLINIC | Age: 68
End: 2023-08-17

## 2023-08-17 ENCOUNTER — EVALUATION (OUTPATIENT)
Dept: PHYSICAL THERAPY | Facility: CLINIC | Age: 68
End: 2023-08-17
Payer: COMMERCIAL

## 2023-08-17 DIAGNOSIS — M54.50 CHRONIC BILATERAL LOW BACK PAIN WITHOUT SCIATICA: ICD-10-CM

## 2023-08-17 DIAGNOSIS — G89.29 CHRONIC BILATERAL LOW BACK PAIN WITHOUT SCIATICA: ICD-10-CM

## 2023-08-17 DIAGNOSIS — M54.16 LUMBAR RADICULOPATHY: ICD-10-CM

## 2023-08-17 DIAGNOSIS — Z98.1 STATUS POST LUMBAR SPINAL FUSION: ICD-10-CM

## 2023-08-17 DIAGNOSIS — M48.062 SPINAL STENOSIS OF LUMBAR REGION WITH NEUROGENIC CLAUDICATION: ICD-10-CM

## 2023-08-17 DIAGNOSIS — M47.816 LUMBAR SPONDYLOSIS: ICD-10-CM

## 2023-08-17 PROCEDURE — 97110 THERAPEUTIC EXERCISES: CPT

## 2023-08-17 PROCEDURE — 97162 PT EVAL MOD COMPLEX 30 MIN: CPT

## 2023-08-17 NOTE — TELEPHONE ENCOUNTER
Patient to inform him that I spoke with Dr. Segundo Oakes. Dr. Segundo Oakes would like patient to be evaluated by Dr. Jordan Thompson at the [facility] for surgical discussion. Per Dr. Mattie Scherer request please schedule visit with Dr. Segundo Oakes on a day that Dr. Jordan Thompson is shadowing Dr. Segundo Oakse.

## 2023-08-17 NOTE — PROGRESS NOTES
PT Evaluation     Today's date: 2023  Patient name: Evelyn Shaw  : 1955  MRN: [id number]  Referring provider: Perry Chirinos MD  Dx:   Encounter Diagnosis     ICD-10-CM    1. Status post lumbar spinal fusion  Z98.1 Ambulatory Referral to Physical Therapy      2. Chronic bilateral low back pain without sciatica  M54.50 Ambulatory Referral to Physical Therapy    G89.29       3. Spinal stenosis of lumbar region with neurogenic claudication  M48.062 Ambulatory Referral to Physical Therapy      4. Lumbar spondylosis  M47.816 Ambulatory Referral to Physical Therapy      5. Lumbar radiculopathy  M54.16 Ambulatory Referral to Physical Therapy                     Assessment  Assessment details: Evelyn Shaw is a pleasant 79 y.o. male who presents with low back pain with intermittent bilateral leg pain s/p L1-S1 posterior fusion on 2023. He presents with decreased hip and lumbar mobility and generalized bilateral LE weakness. He demonstrates high risk for falls with TUG time of 40 seconds with SPC. He was educated on use of a walker rather than SPC for safety with walking as he has reported multiple falls in the past year. These signs and symptoms are consistent with Status post lumbar spinal fusion, Chronic bilateral low back pain without sciatica, Spinal stenosis of lumbar region with neurogenic claudication, Lumbar spondylosis, Lumbar radiculopathy. He will benefit from skilled PT to address impairments and return to maximum level of function as well as reduce risk for falls. At this time no referral is necessary based on examination.    Impairments: abnormal or restricted ROM, activity intolerance, impaired balance, impaired physical strength, lacks appropriate home exercise program, pain with function, poor posture  and poor body mechanics  Prognosis details: Positive prognostic indicators include positive attitude toward recovery, motivated to improve, high self-efficacy, good understanding of condition, realistic expectations. Negative prognostic indicators include chronicity of symptoms, high symptom irritability. Goals  STG to be achieved in 6 weeks  Patient will have improved lumbar ROM to moderate limitation. Patient will have improved gross BLE strength to 4/5  Patient will have improved TUG time with SPC to 25 seconds  Patient will be able to walk household distances with less difficulty  Patient will be independent with HEP. LTG to be achieved in 12 weeks  Patient will be able to walk community distances with less difficulty  Patient will be able to ascend/descend stairs with less difficulty  Patient will be able to dress independently      Plan  Patient would benefit from: skilled physical therapy  Planned modality interventions: cryotherapy and thermotherapy: hydrocollator packs  Planned therapy interventions: manual therapy, joint mobilization, neuromuscular re-education, therapeutic exercise, therapeutic activities, strengthening, stretching, home exercise program, gait training, functional ROM exercises, body mechanics training, balance, flexibility and patient education  Frequency: 2x week  Plan of Care beginning date: 8/17/2023  Plan of Care expiration date: 11/9/2023  Treatment plan discussed with: patient        Subjective Evaluation    History of Present Illness  Date of surgery: 4/11/2023  Mechanism of injury: Patient reports back pain and bilateral leg pain for at least 2 years. He had tried conservative care like injections, etc prior to surgery. He had a posterior fusion of L1-S1 on 4/11/2023. Since surgery, he has had so much back pain and intermittent pain in bilateral legs. Patient did have home PT up until 2 weeks ago for 5-6 visit. Has difficulty with dressing lower body, walking, going up and down the stairs, ADLs. Notes falling frequently.   Patient Goals  Patient goal: Patient would like to be able to manage back and leg pain and improve overall function Objective     Tenderness     Additional Tenderness Details  TTP to thoracolumbar paraspinals    Active Range of Motion     Lumbar   Flexion:  Restriction level: maximal  Extension:  Restriction level: minimal  Left Hip   External rotation (90/90): 30 degrees with pain  Internal rotation (90/90): 20 degrees with pain    Right Hip   External rotation (90/90): 22 degrees with pain  Internal rotation (90/90): 20 degrees with pain    Strength/Myotome Testing     Left Hip   Planes of Motion   Flexion: 3+    Right Hip   Planes of Motion   Flexion: 3+    Left Knee   Flexion: 3+  Extension: 3+    Right Knee   Flexion: 3+  Extension: 3+    Left Ankle/Foot   Dorsiflexion: 4-    Right Ankle/Foot   Dorsiflexion: 4-    Functional Assessment        Comments  TUG w/ SPC on 8/17/2023: 40 seconds             Precautions: posterior fusion L1-S1 on 4/11/2023; hx of cancer; fall risk  Functional Limitations: walking, stairs, dressing, ADLs  Impairments: lumbar and hip ROM, BLE strength, TUG time  [facility] Code: [email or web address]   POC expiration: 11/9/2023      Manuals 8/17            STM lumbar paraspinals             Bilateral hip PROM             Bilateral hip LAD                          Neuro Re-Ed             hooklying TA nv            hooklying TA w/ march                                                                              Ther Ex             bike             LAQ nv            Seated march HEP            Seated lumbar flexion HEP            LTR             SKTC             Standing 3 way hip nv            Standing knee flexion nv            Standing march             HR                                                    Leg press             Mini squat             Ther Activity             Fwd step up             Lateral step up             Gait Training                                       Modalities

## 2023-08-22 ENCOUNTER — APPOINTMENT (OUTPATIENT)
Dept: PHYSICAL THERAPY | Facility: CLINIC | Age: 68
End: 2023-08-22
Payer: COMMERCIAL

## 2023-08-22 NOTE — TELEPHONE ENCOUNTER
LM for patient about appt 8/29 at 11:30 in [facility] office with Dr. Heena Pathak and Dr. Michaela Cee.

## 2023-08-24 ENCOUNTER — OFFICE VISIT (OUTPATIENT)
Dept: PHYSICAL THERAPY | Facility: CLINIC | Age: 68
End: 2023-08-24
Payer: COMMERCIAL

## 2023-08-24 DIAGNOSIS — M54.50 CHRONIC BILATERAL LOW BACK PAIN WITHOUT SCIATICA: ICD-10-CM

## 2023-08-24 DIAGNOSIS — M47.816 LUMBAR SPONDYLOSIS: ICD-10-CM

## 2023-08-24 DIAGNOSIS — Z98.1 STATUS POST LUMBAR SPINAL FUSION: Primary | ICD-10-CM

## 2023-08-24 DIAGNOSIS — M54.16 LUMBAR RADICULOPATHY: ICD-10-CM

## 2023-08-24 DIAGNOSIS — M48.062 SPINAL STENOSIS OF LUMBAR REGION WITH NEUROGENIC CLAUDICATION: ICD-10-CM

## 2023-08-24 DIAGNOSIS — G89.29 CHRONIC BILATERAL LOW BACK PAIN WITHOUT SCIATICA: ICD-10-CM

## 2023-08-24 PROCEDURE — 97110 THERAPEUTIC EXERCISES: CPT

## 2023-08-24 PROCEDURE — 97140 MANUAL THERAPY 1/> REGIONS: CPT

## 2023-08-24 NOTE — PROGRESS NOTES
Daily Note     Today's date: 2023  Patient name: Campos Magdaleno  : 1955  MRN: [id number]  Referring provider: Carmencita Koyanagi, MD  Dx:   Encounter Diagnosis     ICD-10-CM    1. Status post lumbar spinal fusion  Z98.1       2. Chronic bilateral low back pain without sciatica  M54.50     G89.29       3. Spinal stenosis of lumbar region with neurogenic claudication  M48.062       4. Lumbar spondylosis  M47.816       5. Lumbar radiculopathy  M54.16                      Subjective: Patient reports low back pain and buttock pain this morning      Objective: See treatment diary below      Assessment: Patient tolerated treatment well today. Education on his condition and anatomy and pathophysiology of his surgery. He noted relief after mobility exercises, stretching, and STM.  He will benefit from skilled PT to address impairments to return to PLOF      Plan: progress as tolerated     Precautions: posterior fusion L1-S1 on 2023; hx of cancer; fall risk  Functional Limitations: walking, stairs, dressing, ADLs  Impairments: lumbar and hip ROM, BLE strength, TUG time  [facility] Code: [email or web address]   POC expiration: 2023      Manuals            STM lumbar paraspinals  8'           Bilateral hip PROM  4'           Bilateral hip LAD                          Neuro Re-Ed             hooklying TA nv            hooklying TA / march                                                                              Ther Ex             bike  5' for ROM            LAQ nv            Seated march HEP            Seated lumbar flexion HEP            LTR  15ea           SKTC             Standing 3 way hip nv            Standing knee flexion nv            Standing march             HR             Seated hamstring stretch  20"x2                                     Leg press             Mini squat             Ther Activity             Fwd step up             Lateral step up             Gait Training Modalities

## 2023-08-25 ENCOUNTER — NURSE TRIAGE (OUTPATIENT)
Dept: NEUROSURGERY | Facility: CLINIC | Age: 68
End: 2023-08-25

## 2023-08-25 ENCOUNTER — TELEPHONE (OUTPATIENT)
Dept: NEUROSURGERY | Facility: CLINIC | Age: 68
End: 2023-08-25

## 2023-08-25 DIAGNOSIS — Z98.1 S/P LUMBAR FUSION: Primary | ICD-10-CM

## 2023-08-25 NOTE — TELEPHONE ENCOUNTER
Called patient LMOM -      Needed to move AM appointment to 1:00pm on 8/30/23 as the provider will not be in the office in the AM.       Also reminded the patient to have his x-rays completed today, tomorrow or Monday at the latest for his Wednesday appt.

## 2023-08-26 ENCOUNTER — LAB (OUTPATIENT)
Dept: LAB | Facility: HOSPITAL | Age: 68
End: 2023-08-26
Payer: COMMERCIAL

## 2023-08-26 DIAGNOSIS — Z12.5 SCREENING FOR PROSTATE CANCER: ICD-10-CM

## 2023-08-26 LAB — PSA SERPL-MCNC: 1.16 NG/ML (ref 0–4)

## 2023-08-26 PROCEDURE — G0103 PSA SCREENING: HCPCS

## 2023-08-26 PROCEDURE — 36415 COLL VENOUS BLD VENIPUNCTURE: CPT

## 2023-08-28 ENCOUNTER — HOSPITAL ENCOUNTER (OUTPATIENT)
Dept: RADIOLOGY | Facility: HOSPITAL | Age: 68
Discharge: HOME | End: 2023-08-28
Payer: COMMERCIAL

## 2023-08-28 DIAGNOSIS — Z98.1 S/P LUMBAR FUSION: ICD-10-CM

## 2023-08-28 PROCEDURE — 72100 X-RAY EXAM L-S SPINE 2/3 VWS: CPT

## 2023-08-29 ENCOUNTER — HOSPITAL ENCOUNTER (OUTPATIENT)
Dept: ULTRASOUND IMAGING | Facility: CLINIC | Age: 68
Discharge: HOME | End: 2023-08-29
Payer: COMMERCIAL

## 2023-08-29 ENCOUNTER — APPOINTMENT (OUTPATIENT)
Dept: PHYSICAL THERAPY | Facility: CLINIC | Age: 68
End: 2023-08-29
Payer: COMMERCIAL

## 2023-08-29 ENCOUNTER — TELEPHONE (OUTPATIENT)
Dept: UROLOGY | Facility: AMBULATORY SURGERY CENTER | Age: 68
End: 2023-08-29

## 2023-08-29 ENCOUNTER — OFFICE VISIT (OUTPATIENT)
Dept: UROLOGY | Facility: AMBULATORY SURGERY CENTER | Age: 68
End: 2023-08-29
Payer: COMMERCIAL

## 2023-08-29 VITALS
HEART RATE: 77 BPM | BODY MASS INDEX: 27.22 KG/M2 | DIASTOLIC BLOOD PRESSURE: 64 MMHG | SYSTOLIC BLOOD PRESSURE: 118 MMHG | OXYGEN SATURATION: 98 % | HEIGHT: 68 IN

## 2023-08-29 DIAGNOSIS — N50.82 SCROTAL PAIN: ICD-10-CM

## 2023-08-29 DIAGNOSIS — N40.1 BENIGN PROSTATIC HYPERPLASIA WITH LOWER URINARY TRACT SYMPTOMS, SYMPTOM DETAILS UNSPECIFIED: Primary | ICD-10-CM

## 2023-08-29 DIAGNOSIS — R33.9 URINARY RETENTION: ICD-10-CM

## 2023-08-29 PROBLEM — R39.9 LOWER URINARY TRACT SYMPTOMS: Status: ACTIVE | Noted: 2023-08-29

## 2023-08-29 PROBLEM — R33.8 BENIGN PROSTATIC HYPERPLASIA WITH URINARY RETENTION: Status: ACTIVE | Noted: 2023-08-29

## 2023-08-29 PROCEDURE — 87086 URINE CULTURE/COLONY COUNT: CPT | Performed by: UROLOGY

## 2023-08-29 PROCEDURE — 99214 OFFICE O/P EST MOD 30 MIN: CPT | Performed by: UROLOGY

## 2023-08-29 PROCEDURE — 76870 US EXAM SCROTUM: CPT

## 2023-08-29 PROCEDURE — 87077 CULTURE AEROBIC IDENTIFY: CPT | Performed by: UROLOGY

## 2023-08-29 PROCEDURE — 87186 SC STD MICRODIL/AGAR DIL: CPT | Performed by: UROLOGY

## 2023-08-29 RX ORDER — SULFAMETHOXAZOLE AND TRIMETHOPRIM 800; 160 MG/1; MG/1
1 TABLET ORAL EVERY 12 HOURS SCHEDULED
Qty: 20 TABLET | Refills: 0 | Status: SHIPPED | OUTPATIENT
Start: 2023-08-29 | End: 2023-09-08

## 2023-08-29 NOTE — ASSESSMENT & PLAN NOTE
Patient does CIC 3 times per day. He stated that for the past week or so he has noticed increased discomfort in his right testis. He notes that the testis is slightly swollen compared to the left side. On exam, the right testis is enlarged, indurated, tender to palpation. He appears to have right epididymal orchitis. Urine culture was collected in the office today. I explained to him that he likely has an infection in his right testis, likely brought on by urinary tract infection from his catheterizations. I told him to continue taking Tylenol and ibuprofen for discomfort. I also told him to start Bactrim, which I sent to his pharmacy today. We also discussed the possibility of getting a scrotal ultrasound to rule out any other concerning pathology, and him and his wife were in agreement. PLAN  -Bactrim x10 days sent to pharmacy  -Urine culture sent. Explained to the patient that we will send him a new antibiotic if his urine culture is resistant to Bactrim. -Follow-up scrotal ultrasound  -Told patient and his wife that if he experiences worsening of his symptoms, he needs to call the office or present to the ED emergently. Concerning signs or symptoms including but not limited to: Fevers, chills, nausea, vomiting, worsening swelling, worsening pain.

## 2023-08-29 NOTE — ASSESSMENT & PLAN NOTE
Patient explained that since 2021, he had noticed increased difficulty voiding. He admitted to nocturia x3, weak stream, straining, hesitancy. 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 He is on Flomax and finasteride. He is tolerating his medications well. He will continue to take these medications as directed.       PLAN  -See BPH plan

## 2023-08-29 NOTE — TELEPHONE ENCOUNTER
Next available urodynamics (whichever location opened first)  Follow up rangel dia after urodynamics is complete. ..UDS 12/7

## 2023-08-29 NOTE — LETTER
August 29, 2023     Mariel Ordaz DO  [address]  [address]    Patient: Ceasar Garcia   YOB: 1955   Date of Visit: 8/29/2023       Dear Dr. Chani Kim: Thank you for referring Ceasar Garcia to me for evaluation. Below are my notes for this consultation. If you have questions, please do not hesitate to call me. I look forward to following your patient along with you. Sincerely,        Danya Muñoz MD        CC: No Recipients    Danya Muñoz MD  8/29/2023 11:34 AM  Sign when Signing Visit  Homer Leigh is a 79 y.o. with BPH and retention requiring CIC. March 2023: Surgery with Dr. Nathalie Feliz at [facility]. Underwent L1-S1 posterior decompression with fixation fusion. Required CIC in the postoperative period. When he was discharged to rehab, he was told to stop his catheterizations. Upon urologic follow-up, he was found to be in retention and was told to start self-catheterization. Dr. Tess Hernandez performed cysto 7/18/23. Noted to have large prostate (140 g on imaging). Lateral lobe coaptation. No distinct median lobe appreciated. He has been doing CIC since July 2023. He voids on his own. After first void in AM, once in PM, and last void before bed, he does CIC for residual. These residuals range from  cc. Most catheterization are around 200 cc. Assessment and plan:     Urinary retention / BPH    Patient explained that starting in 2021, he did develop lower urinary tract symptoms including nocturia, straining, weak stream.  The symptoms are overall not bothersome to him. However, after his spinal surgery in March 2023, he failed multiple trials of void during his prolonged hospitalization. He was transition to CIC at that time. He was discharged to rehab and was told to stop doing CIC. On urology follow-up, he was found to be in retention and was started on CIC regimen.     He is currently doing a CIC regimen of 3 times daily. He voids first thing in the morning followed by CIC. He also does a void in the afternoon followed by CIC. He also does a void before bed followed by CIC. He does void intermittently in between these various catheterizations. We reviewed the typical mechanics of voiding physiology today. I explained that the bladder has to have proper neural input in order to squeeze properly to initiate a void. I also explained that in order to avoid, urine has to pass through the bladder and through the channel of the prostate. I explained that if the prostate is enlarged due to pathology such as BPH, it can be more difficult for urine to pass. However, I explained that if his bladder is not optimally functioning, he would have difficulty urinating even with an open channel. We did review that his cystoscopy in the office did reveal an enlarged prostate and bladder outlet obstruction. These findings could certainly contribute to difficulty voiding, however, we did discuss that the underlying lack of bladder function could potentially be the biggest concern. I therefore explained that prior to discussing any bladder outlet procedure, we would need to assess his bladder function. I explained that I wanted him to undergo urodynamics testing. I explained that urodynamics testing would be done in the office. I explained that this would require the use of a catheter in his bladder as well as a sensor placed in his rectum. I explained that this test would allow us to assess the functional contraction of his bladder and could tell us if he would have a better chance of voiding on his own without a catheter after a bladder outlet procedure. Risks of the procedure were explained to the patient and his wife. These risks include but are not limited to infection, failure of diagnosis, changes in blood pressure and heart rate, discomfort during testing.         PLAN  -Next available urodynamics testing  -Will see him in the office after urodynamics testing is complete to review the results and discuss bladder outlet procedures          SCROTAL PAIN    Patient does CIC 3 times per day. He stated that for the past week or so he has noticed increased discomfort in his right testis. He notes that the testis is slightly swollen compared to the left side. On exam, the right testis is enlarged, indurated, tender to palpation. He appears to have right epididymal orchitis. Urine culture was collected in the office today. I explained to him that he likely has an infection in his right testis, likely brought on by urinary tract infection from his catheterizations. I told him to continue taking Tylenol and ibuprofen for discomfort. I also told him to start Bactrim, which I sent to his pharmacy today. We also discussed the possibility of getting a scrotal ultrasound to rule out any other concerning pathology, and him and his wife were in agreement. PLAN  -Bactrim x10 days sent to pharmacy  -Urine culture sent. Explained to the patient that we will send him a new antibiotic if his urine culture is resistant to Bactrim. -Follow-up scrotal ultrasound  -Told patient and his wife that if he experiences worsening of his symptoms, he needs to call the office or present to the ED emergently. Concerning signs or symptoms including but not limited to: Fevers, chills, nausea, vomiting, worsening swelling, worsening pain.               Deshaun Cyr DO        Chief Complaint     BPH, urinary retention      History of Present Illness     Yesy Rainey is a 79 y.o. male presenting in consultation for BPH.    -72-year-old male with a history of urinary retention and BPH  -He had major spine surgery in April 2023 and subsequently developed urinary retention  -Underwent cystoscopy with Dr. Keely Shetty on 7/18/2023 which at that time demonstrated a 140 g prostate with no bladder lesions  -Patient was instructed to continue with Flomax as well as add finasteride 5 mg daily to his medical regimen  -He was also instructed to self catheterize 4 times a day after spontaneous voids and to keep a voiding log  -Presents to the office today for post CIC teaching evaluation  -Dr. Michael Villarreal discussed option of robotic simple prostatectomy versus HoLEP  -Patient denies any problems with CIC at this point  -States that he sometimes does have experience of urgency  -Has been utilizing CIC for 4 times a day  -Denies any dysuria, gross hematuria, flank pain    INTERVAL HISTORY:    Seen in office today for HINES treatment discussion. Still on Flomax and finasteride. PSA 1.16 on 8/26/23. We discussed his baseline voiding sx prior to spinal surgery. He stated that starting around 2021. He experience nocturia x3, staining, weak stream, dribbling, urgency. Denied ever having gross hematuria, incontinence. For his spinal surgery in March 2023, he was hospitalized for 16 days. He had indwelling Mendoza during hospital stay due to failed trial of void. He was transitioned to CIC. He was transferred to rehab and CIC was not continued at that time. He saw urology in office in May 2023 and then had cystoscopy with Dr. Michael Villarreal 7/18/23. Noted to have large prostate (140 g on imaging). Lateral lobe coaptation. No distinct median lobe appreciated. He has been doing CIC since July 2023. He voids on his own. After first void in AM, once in PM, and last void before bed, he does CIC for residual. These residuals range from  cc. Most catheterization are around 200 cc. Since his surgery he is mostly using his cane with close monitoring. (Of note he was walking on his own before surgery). He also stated that for about 1 week he noticed right scrotal swelling and soreness. He denied redness, fevers, chills, difficulties with catheterization.      Review of Systems     Review of Systems   Constitutional: Negative for chills, diaphoresis and fever. HENT: Negative for congestion and ear pain. Respiratory: Negative for cough and shortness of breath. Gastrointestinal: Negative for nausea and vomiting. Genitourinary: Positive for scrotal swelling and testicular pain. Musculoskeletal: Positive for back pain and gait problem. Neurological: Negative for dizziness and headaches. Psychiatric/Behavioral: Negative for agitation and confusion. Allergies     Allergies   Allergen Reactions   • Abilify [Aripiprazole] Tremor     Shaking     • Cephalexin Rash   • Molds [employer] Smuts Allergic Rhinitis   • Pregabalin Tremor     Lyrica - shaking feeling       Physical Exam     Physical Exam  Constitutional:       General: He is not in acute distress. Appearance: He is not diaphoretic. HENT:      Head: Normocephalic and atraumatic. Nose: Nose normal.   Pulmonary:      Effort: Pulmonary effort is normal. No respiratory distress. Abdominal:      General: There is no distension. Tenderness: There is no abdominal tenderness. Genitourinary:     Comments: Uncircumcised, no phimosis, no meatal stenosis. Left testis is soft and nontender. Right testis moderately enlarged, indurated, tender. Musculoskeletal:      Comments: Requires cane to ambulate   Neurological:      Mental Status: He is oriented to person, place, and time.    Psychiatric:         Mood and Affect: Mood normal.         Behavior: Behavior normal.             Vital Signs  Vitals:    08/29/23 1139   BP: 118/64   BP Location: Left arm   Patient Position: Sitting   Cuff Size: Adult   Pulse: 77   SpO2: 98%   Height: 5' 8" (1.727 m)         Current Medications       Current Outpatient Medications:   •  ACCU-CHEK FABIANO PLUS test strip, 4 (four) times a day, Disp: , Rfl: 1  •  ACCU-CHEK FASTCLIX LANCETS MISC, 4 (four) times a day, Disp: , Rfl: 1  •  acetaminophen (TYLENOL) 500 mg tablet, Take 1 tablet (500 mg total) by mouth every 6 (six) hours as needed for mild pain 500 mg tablet, Disp: , Rfl: 0  •  CHOLECALCIFEROL PO, Take 5,000 Units by mouth daily, Disp: , Rfl:   •  clonazePAM (KlonoPIN) 1 mg tablet, Take 1 mg by mouth 2 (two) times a day & 3rd pill PRN, Disp: , Rfl:   •  finasteride (PROSCAR) 5 mg tablet, Take 1 tablet (5 mg total) by mouth daily, Disp: 90 tablet, Rfl: 3  •  fluticasone (FLONASE) 50 mcg/act nasal spray, 1 spray into each nostril 2 (two) times a day, Disp: , Rfl:   •  folic acid (FOLVITE) 1 mg tablet, Take 2,000 mcg by mouth daily, Disp: , Rfl: 6  •  GNP Aspirin Low Dose 81 MG EC tablet, Take 1 tablet (81 mg total) by mouth daily Do not start before April 25, 2023., Disp: , Rfl: 0  •  hydrOXYzine pamoate (VISTARIL) 50 mg capsule, Take 50 mg by mouth daily at bedtime, Disp: , Rfl:   •  ibuprofen (MOTRIN) 800 mg tablet, Take 800 mg by mouth every 6 (six) hours as needed for mild pain, Disp: , Rfl:   •  Jardiance 10 MG TABS, Take 10 mg by mouth every morning, Disp: , Rfl:   •  ketoconazole (NIZORAL) 2 % shampoo, Apply 1 application.  topically daily Use as directed, Disp: , Rfl: 6  •  Lantus SoloStar 100 units/mL SOPN, Inject 30 Units under the skin every morning, Disp: , Rfl:   •  losartan-hydrochlorothiazide (HYZAAR) 100-25 MG per tablet, Take 1 tablet by mouth every morning, Disp: , Rfl: 0  •  lovastatin (MEVACOR) 20 mg tablet, Take 20 mg by mouth every morning, Disp: , Rfl: 3  •  metFORMIN (GLUCOPHAGE) 1000 MG tablet, Take 1,000 mg by mouth 2 (two) times a day with meals , Disp: , Rfl:   •  methocarbamol (ROBAXIN) 750 mg tablet, Take 1 tablet (750 mg total) by mouth every 6 (six) hours, Disp: 45 tablet, Rfl: 0  •  METOPROLOL SUCCINATE ER PO, Take 75 mg by mouth every morning, Disp: , Rfl:   •  mirtazapine (REMERON) 45 MG tablet, Take 45 mg by mouth daily at bedtime, Disp: , Rfl: 1  •  NIFEdipine (PROCARDIA XL) 30 mg 24 hr tablet, Take 120 mg by mouth every morning Takes 90 mg and 30 mg =120 mg every AM, Disp: , Rfl:   •  NIFEdipine (PROCARDIA XL) 90 mg 24 hr tablet, Take 120 mg by mouth every morning Takes 90 mg and 30 mg =120 mg every AM, Disp: , Rfl: 3  •  sertraline (ZOLOFT) 100 mg tablet, Take 100 mg by mouth every morning, Disp: , Rfl:   •  sulfamethoxazole-trimethoprim (BACTRIM DS) 800-160 mg per tablet, Take 1 tablet by mouth every 12 (twelve) hours for 10 days, Disp: 20 tablet, Rfl: 0  •  tamsulosin (FLOMAX) 0.4 mg, Take 1 capsule (0.4 mg total) by mouth daily with dinner, Disp: 90 capsule, Rfl: 3  •  metoprolol succinate (TOPROL-XL) 50 mg 24 hr tablet, TAKE 1 AND 1/2 tabkets BY MOUTH ONCE A DAY (Patient not taking: Reported on 8/15/2023), Disp: , Rfl:   •  polyethylene glycol (Golytely) 4000 mL solution, Take 4,000 mL by mouth once for 1 dose Take 4000 mL by mouth once for 1 dose.  Use as directed, Disp: 4000 mL, Rfl: 0      Active Problems     Patient Active Problem List   Diagnosis   • Alcoholism in remission ([address])   • Benzodiazepine dependence ([address])   • Bilateral adhesive capsulitis of shoulders   • Bronchitis, mucopurulent recurrent (HCC)   • Cirrhosis, alcoholic (HCC)   • Diabetic peripheral neuropathy ([address])   • DM (diabetes mellitus) ([address])   • Herniated nucleus pulposus of lumbosacral region   • Hypercholesterolemia   • Lumbar spondylosis   • Thyroid cancer (HCC)   • Liver disease   • Chronic pain syndrome   • Chronic bilateral low back pain without sciatica   • PONV (postoperative nausea and vomiting)   • Medical marijuana use   • Urinary retention   • Anemia   • Hyponatremia   • Gallstones   • Status post lumbar spinal fusion   • Benign prostatic hyperplasia with urinary retention   • Scrotal pain   • Lower urinary tract symptoms         Past Medical History     Past Medical History:   Diagnosis Date   • Anxiety    • Arthritis    • Cancer ([address])    • Depression    • Diabetes mellitus ([address])    • Hypertension    • Liver disease    • Mitral valve prolapse    • PONV (postoperative nausea and vomiting)    • Thyroid disease Surgical History     Past Surgical History:   Procedure Laterality Date   • COLONOSCOPY     • INCISION AND DRAINAGE OF WOUND Left 2022    Procedure: INCISION AND DRAINAGE (I&D) EXTREMITY;  Surgeon: Ethan Santa DPM;  Location:  MAIN OR;  Service: Podiatry   • JOINT REPLACEMENT Left 2022    Left TSA   • OH ARTHRODESIS POSTERIOR/PSTLAT TQ 1NTRSPC LUMBAR Bilateral 2023    Procedure: L1-S1 navigated posterior decompression with instrumented fixation fusion;  Surgeon: Campbell Field MD;  Location:  MAIN OR;  Service: Neurosurgery   • THYROID SURGERY      remove cancer         Family History     Family History   Problem Relation Age of Onset   • No Known Problems Father    • No Known Problems Mother    • Colon cancer Neg Hx          Social History     Social History     Social History     Tobacco Use   Smoking Status Former   • Packs/day: 0.25   • Types: Cigarettes   • Quit date:    • Years since quittin.6   Smokeless Tobacco Never   Tobacco Comments    quit          Pertinent Lab Values     Lab Results   Component Value Date    CREATININE 0.79 2023       Lab Results   Component Value Date    PSA 1.16 2023       Urine cultures  23: 50-59k E coli pansensitive        Pertinent Imaging     CT abdomen pelvis with contrast 2023: No hydronephrosis, no stones bilaterally; diffuse bladder wall thickening; prostate measured around 140 g      Pertinent Pathology     N/A

## 2023-08-29 NOTE — ASSESSMENT & PLAN NOTE
See urinary retention assessment and plan No No Residual Tumor Seen Histology Text: There were no malignant cells seen in the sections examined.

## 2023-08-29 NOTE — PROGRESS NOTES
UROLOGY FOLLOW-UP ENCOUNTER    Jacki Pandey is a 79 y.o. with BPH and retention requiring CIC. March 2023: Surgery with Dr. Clemencia Blanca at [facility]. Underwent L1-S1 posterior decompression with fixation fusion. Required CIC in the postoperative period. When he was discharged to rehab, he was told to stop his catheterizations. Upon urologic follow-up, he was found to be in retention and was told to start self-catheterization. Dr. Sara Acosta performed cysto 7/18/23. Noted to have large prostate (140 g on imaging). Lateral lobe coaptation. No distinct median lobe appreciated. He has been doing CIC since July 2023. He voids on his own. After first void in AM, once in PM, and last void before bed, he does CIC for residual. These residuals range from  cc. Most catheterization are around 200 cc. Assessment and plan:     Urinary retention / BPH    Patient explained that starting in 2021, he did develop lower urinary tract symptoms including nocturia, straining, weak stream.  The symptoms are overall not bothersome to him. However, after his spinal surgery in March 2023, he failed multiple trials of void during his prolonged hospitalization. He was transition to CIC at that time. He was discharged to rehab and was told to stop doing CIC. On urology follow-up, he was found to be in retention and was started on CIC regimen. He is currently doing a CIC regimen of 3 times daily. He voids first thing in the morning followed by CIC. He also does a void in the afternoon followed by CIC. He also does a void before bed followed by CIC. He does void intermittently in between these various catheterizations. We reviewed the typical mechanics of voiding physiology today. I explained that the bladder has to have proper neural input in order to squeeze properly to initiate a void.   I also explained that in order to avoid, urine has to pass through the bladder and through the channel of the prostate. I explained that if the prostate is enlarged due to pathology such as BPH, it can be more difficult for urine to pass. However, I explained that if his bladder is not optimally functioning, he would have difficulty urinating even with an open channel. We did review that his cystoscopy in the office did reveal an enlarged prostate and bladder outlet obstruction. These findings could certainly contribute to difficulty voiding, however, we did discuss that the underlying lack of bladder function could potentially be the biggest concern. I therefore explained that prior to discussing any bladder outlet procedure, we would need to assess his bladder function. I explained that I wanted him to undergo urodynamics testing. I explained that urodynamics testing would be done in the office. I explained that this would require the use of a catheter in his bladder as well as a sensor placed in his rectum. I explained that this test would allow us to assess the functional contraction of his bladder and could tell us if he would have a better chance of voiding on his own without a catheter after a bladder outlet procedure. Risks of the procedure were explained to the patient and his wife. These risks include but are not limited to infection, failure of diagnosis, changes in blood pressure and heart rate, discomfort during testing. PLAN  -Next available urodynamics testing  -Will see him in the office after urodynamics testing is complete to review the results and discuss bladder outlet procedures          SCROTAL PAIN    Patient does CIC 3 times per day. He stated that for the past week or so he has noticed increased discomfort in his right testis. He notes that the testis is slightly swollen compared to the left side. On exam, the right testis is enlarged, indurated, tender to palpation. He appears to have right epididymal orchitis. Urine culture was collected in the office today.   I explained to him that he likely has an infection in his right testis, likely brought on by urinary tract infection from his catheterizations. I told him to continue taking Tylenol and ibuprofen for discomfort. I also told him to start Bactrim, which I sent to his pharmacy today. We also discussed the possibility of getting a scrotal ultrasound to rule out any other concerning pathology, and him and his wife were in agreement. PLAN  -Bactrim x10 days sent to pharmacy  -Urine culture sent. Explained to the patient that we will send him a new antibiotic if his urine culture is resistant to Bactrim. -Follow-up scrotal ultrasound  -Told patient and his wife that if he experiences worsening of his symptoms, he needs to call the office or present to the ED emergently. Concerning signs or symptoms including but not limited to: Fevers, chills, nausea, vomiting, worsening swelling, worsening pain.               Kristine Layton DO        Chief Complaint     BPH, urinary retention      History of Present Illness     Ceasar Garcia is a 79 y.o. male presenting in consultation for BPH.    -80-year-old male with a history of urinary retention and BPH  -He had major spine surgery in April 2023 and subsequently developed urinary retention  -Underwent cystoscopy with Dr. Tess Hernandez on 7/18/2023 which at that time demonstrated a 140 g prostate with no bladder lesions  -Patient was instructed to continue with Flomax as well as add finasteride 5 mg daily to his medical regimen  -He was also instructed to self catheterize 4 times a day after spontaneous voids and to keep a voiding log  -Presents to the office today for post CIC teaching evaluation  -Dr. Tess Hernandez discussed option of robotic simple prostatectomy versus HoLEP  -Patient denies any problems with CIC at this point  -States that he sometimes does have experience of urgency  -Has been utilizing CIC for 4 times a day  -Denies any dysuria, gross hematuria, flank pain    INTERVAL HISTORY:    Seen in office today for HINES treatment discussion. Still on Flomax and finasteride. PSA 1.16 on 8/26/23. We discussed his baseline voiding sx prior to spinal surgery. He stated that starting around 2021. He experience nocturia x3, staining, weak stream, dribbling, urgency. Denied ever having gross hematuria, incontinence. For his spinal surgery in March 2023, he was hospitalized for 16 days. He had indwelling Mendoza during hospital stay due to failed trial of void. He was transitioned to CIC. He was transferred to rehab and CIC was not continued at that time. He saw urology in office in May 2023 and then had cystoscopy with Dr. Sara Acosta 7/18/23. Noted to have large prostate (140 g on imaging). Lateral lobe coaptation. No distinct median lobe appreciated. He has been doing CIC since July 2023. He voids on his own. After first void in AM, once in PM, and last void before bed, he does CIC for residual. These residuals range from  cc. Most catheterization are around 200 cc. Since his surgery he is mostly using his cane with close monitoring. (Of note he was walking on his own before surgery). He also stated that for about 1 week he noticed right scrotal swelling and soreness. He denied redness, fevers, chills, difficulties with catheterization. Review of Systems     Review of Systems   Constitutional: Negative for chills, diaphoresis and fever. HENT: Negative for congestion and ear pain. Respiratory: Negative for cough and shortness of breath. Gastrointestinal: Negative for nausea and vomiting. Genitourinary: Positive for scrotal swelling and testicular pain. Musculoskeletal: Positive for back pain and gait problem. Neurological: Negative for dizziness and headaches. Psychiatric/Behavioral: Negative for agitation and confusion.            Allergies     Allergies   Allergen Reactions   • Abilify [Aripiprazole] Tremor     Shaking     • Cephalexin Rash   • M[employer] Allergic Rhinitis   • Pregabalin Tremor     Lyrica - shaking feeling       Physical Exam     Physical Exam  Constitutional:       General: He is not in acute distress. Appearance: He is not diaphoretic. HENT:      Head: Normocephalic and atraumatic. Nose: Nose normal.   Pulmonary:      Effort: Pulmonary effort is normal. No respiratory distress. Abdominal:      General: There is no distension. Tenderness: There is no abdominal tenderness. Genitourinary:     Comments: Uncircumcised, no phimosis, no meatal stenosis. Left testis is soft and nontender. Right testis moderately enlarged, indurated, tender. Musculoskeletal:      Comments: Requires cane to ambulate   Neurological:      Mental Status: He is oriented to person, place, and time.    Psychiatric:         Mood and Affect: Mood normal.         Behavior: Behavior normal.             Vital Signs  Vitals:    08/29/23 1139   BP: 118/64   BP Location: Left arm   Patient Position: Sitting   Cuff Size: Adult   Pulse: 77   SpO2: 98%   Height: 5' 8" (1.727 m)         Current Medications       Current Outpatient Medications:   •  ACCU-CHEK FABIANO PLUS test strip, 4 (four) times a day, Disp: , Rfl: 1  •  ACCU-CHEK FASTCLIX LANCETS MISC, 4 (four) times a day, Disp: , Rfl: 1  •  acetaminophen (TYLENOL) 500 mg tablet, Take 1 tablet (500 mg total) by mouth every 6 (six) hours as needed for mild pain 500 mg tablet, Disp: , Rfl: 0  •  CHOLECALCIFEROL PO, Take 5,000 Units by mouth daily, Disp: , Rfl:   •  clonazePAM (KlonoPIN) 1 mg tablet, Take 1 mg by mouth 2 (two) times a day & 3rd pill PRN, Disp: , Rfl:   •  finasteride (PROSCAR) 5 mg tablet, Take 1 tablet (5 mg total) by mouth daily, Disp: 90 tablet, Rfl: 3  •  fluticasone (FLONASE) 50 mcg/act nasal spray, 1 spray into each nostril 2 (two) times a day, Disp: , Rfl:   •  folic acid (FOLVITE) 1 mg tablet, Take 2,000 mcg by mouth daily, Disp: , Rfl: 6  •  GNP Aspirin Low Dose 81 MG EC tablet, Take 1 tablet (81 mg total) by mouth daily Do not start before April 25, 2023., Disp: , Rfl: 0  •  hydrOXYzine pamoate (VISTARIL) 50 mg capsule, Take 50 mg by mouth daily at bedtime, Disp: , Rfl:   •  ibuprofen (MOTRIN) 800 mg tablet, Take 800 mg by mouth every 6 (six) hours as needed for mild pain, Disp: , Rfl:   •  Jardiance 10 MG TABS, Take 10 mg by mouth every morning, Disp: , Rfl:   •  ketoconazole (NIZORAL) 2 % shampoo, Apply 1 application.  topically daily Use as directed, Disp: , Rfl: 6  •  Lantus SoloStar 100 units/mL SOPN, Inject 30 Units under the skin every morning, Disp: , Rfl:   •  losartan-hydrochlorothiazide (HYZAAR) 100-25 MG per tablet, Take 1 tablet by mouth every morning, Disp: , Rfl: 0  •  lovastatin (MEVACOR) 20 mg tablet, Take 20 mg by mouth every morning, Disp: , Rfl: 3  •  metFORMIN (GLUCOPHAGE) 1000 MG tablet, Take 1,000 mg by mouth 2 (two) times a day with meals , Disp: , Rfl:   •  methocarbamol (ROBAXIN) 750 mg tablet, Take 1 tablet (750 mg total) by mouth every 6 (six) hours, Disp: 45 tablet, Rfl: 0  •  METOPROLOL SUCCINATE ER PO, Take 75 mg by mouth every morning, Disp: , Rfl:   •  mirtazapine (REMERON) 45 MG tablet, Take 45 mg by mouth daily at bedtime, Disp: , Rfl: 1  •  NIFEdipine (PROCARDIA XL) 30 mg 24 hr tablet, Take 120 mg by mouth every morning Takes 90 mg and 30 mg =120 mg every AM, Disp: , Rfl:   •  NIFEdipine (PROCARDIA XL) 90 mg 24 hr tablet, Take 120 mg by mouth every morning Takes 90 mg and 30 mg =120 mg every AM, Disp: , Rfl: 3  •  sertraline (ZOLOFT) 100 mg tablet, Take 100 mg by mouth every morning, Disp: , Rfl:   •  sulfamethoxazole-trimethoprim (BACTRIM DS) 800-160 mg per tablet, Take 1 tablet by mouth every 12 (twelve) hours for 10 days, Disp: 20 tablet, Rfl: 0  •  tamsulosin (FLOMAX) 0.4 mg, Take 1 capsule (0.4 mg total) by mouth daily with dinner, Disp: 90 capsule, Rfl: 3  •  metoprolol succinate (TOPROL-XL) 50 mg 24 hr tablet, TAKE 1 AND 1/2 tabkets BY MOUTH ONCE A DAY (Patient not taking: Reported on 8/15/2023), Disp: , Rfl:   •  polyethylene glycol (Golytely) 4000 mL solution, Take 4,000 mL by mouth once for 1 dose Take 4000 mL by mouth once for 1 dose.  Use as directed, Disp: 4000 mL, Rfl: 0      Active Problems     Patient Active Problem List   Diagnosis   • Alcoholism in remission ([address])   • Benzodiazepine dependence ([address])   • Bilateral adhesive capsulitis of shoulders   • Bronchitis, mucopurulent recurrent (HCC)   • Cirrhosis, alcoholic (HCC)   • Diabetic peripheral neuropathy ([address])   • DM (diabetes mellitus) ([address])   • Herniated nucleus pulposus of lumbosacral region   • Hypercholesterolemia   • Lumbar spondylosis   • Thyroid cancer (HCC)   • Liver disease   • Chronic pain syndrome   • Chronic bilateral low back pain without sciatica   • PONV (postoperative nausea and vomiting)   • Medical marijuana use   • Urinary retention   • Anemia   • Hyponatremia   • Gallstones   • Status post lumbar spinal fusion   • Benign prostatic hyperplasia with urinary retention   • Scrotal pain   • Lower urinary tract symptoms         Past Medical History     Past Medical History:   Diagnosis Date   • Anxiety    • Arthritis    • Cancer ([address])    • Depression    • Diabetes mellitus ([address])    • Hypertension    • Liver disease    • Mitral valve prolapse    • PONV (postoperative nausea and vomiting)    • Thyroid disease          Surgical History     Past Surgical History:   Procedure Laterality Date   • COLONOSCOPY     • INCISION AND DRAINAGE OF WOUND Left 08/12/2022    Procedure: INCISION AND DRAINAGE (I&D) EXTREMITY;  Surgeon: Orlando Noyola DPM;  Location:  MAIN OR;  Service: Podiatry   • JOINT REPLACEMENT Left 03/11/2022    Left TSA   • AZ ARTHRODESIS POSTERIOR/PSTLAT TQ 1NTRSPC LUMBAR Bilateral 04/11/2023    Procedure: L1-S1 navigated posterior decompression with instrumented fixation fusion;  Surgeon: Iver Merlin, MD;  Location:  MAIN OR;  Service: Neurosurgery • THYROID SURGERY      remove cancer         Family History     Family History   Problem Relation Age of Onset   • No Known Problems Father    • No Known Problems Mother    • Colon cancer Neg Hx          Social History     Social History     Social History     Tobacco Use   Smoking Status Former   • Packs/day: 0.25   • Types: Cigarettes   • Quit date:    • Years since quittin.6   Smokeless Tobacco Never   Tobacco Comments    quit          Pertinent Lab Values     Lab Results   Component Value Date    CREATININE 0.79 2023       Lab Results   Component Value Date    PSA 1.16 2023       Urine cultures  23: 50-59k E coli pansensitive        Pertinent Imaging     CT abdomen pelvis with contrast 2023: No hydronephrosis, no stones bilaterally; diffuse bladder wall thickening; prostate measured around 140 g      Pertinent Pathology     N/A

## 2023-08-29 NOTE — ASSESSMENT & PLAN NOTE
Patient explained that starting in 2021, he did develop lower urinary tract symptoms including nocturia, straining, weak stream.  The symptoms are overall not bothersome to him. However, after his spinal surgery in March 2023, he failed multiple trials of void during his prolonged hospitalization. He was transition to CIC at that time. He was discharged to rehab and was told to stop doing CIC. On urology follow-up, he was found to be in retention and was started on CIC regimen. He is currently doing a CIC regimen of 3 times daily. He voids first thing in the morning followed by CIC. He also does a void in the afternoon followed by CIC. He also does a void before bed followed by CIC. He does void intermittently in between these various catheterizations. We reviewed the typical mechanics of voiding physiology today. I explained that the bladder has to have proper neural input in order to squeeze properly to initiate a void. I also explained that in order to avoid, urine has to pass through the bladder and through the channel of the prostate. I explained that if the prostate is enlarged due to pathology such as BPH, it can be more difficult for urine to pass. However, I explained that if his bladder is not optimally functioning, he would have difficulty urinating even with an open channel. I therefore explained that prior to discussing any bladder outlet procedure, we would need to assess his bladder function. I explained that I wanted him to undergo urodynamics testing. I explained that urodynamics testing would be done in the office. I explained that this would require the use of a catheter in his bladder as well as a sensor placed in his rectum. I explained that this test would allow us to assess the functional contraction of his bladder and could tell us if he would have a better chance of voiding on his own without a catheter after a bladder outlet procedure.   Risks of the procedure were explained to the patient and his wife. These risks include but are not limited to infection, failure of diagnosis, changes in blood pressure and heart rate, discomfort during testing.         PLAN  -Next available urodynamics testing  -Will see him in the office after urodynamics testing is complete to review the results and discuss bladder outlet procedures

## 2023-08-30 ENCOUNTER — OFFICE VISIT (OUTPATIENT)
Dept: NEUROSURGERY | Facility: CLINIC | Age: 68
End: 2023-08-30
Payer: COMMERCIAL

## 2023-08-30 ENCOUNTER — HOSPITAL ENCOUNTER (EMERGENCY)
Facility: HOSPITAL | Age: 68
Discharge: HOME | End: 2023-08-30
Attending: EMERGENCY MEDICINE
Payer: COMMERCIAL

## 2023-08-30 ENCOUNTER — TELEPHONE (OUTPATIENT)
Age: 68
End: 2023-08-30

## 2023-08-30 VITALS
TEMPERATURE: 98.7 F | RESPIRATION RATE: 14 BRPM | HEART RATE: 77 BPM | DIASTOLIC BLOOD PRESSURE: 95 MMHG | SYSTOLIC BLOOD PRESSURE: 137 MMHG | OXYGEN SATURATION: 99 %

## 2023-08-30 VITALS
BODY MASS INDEX: 27.22 KG/M2 | TEMPERATURE: 98 F | HEIGHT: 68 IN | SYSTOLIC BLOOD PRESSURE: 128 MMHG | OXYGEN SATURATION: 95 % | DIASTOLIC BLOOD PRESSURE: 78 MMHG | HEART RATE: 75 BPM | RESPIRATION RATE: 17 BRPM

## 2023-08-30 DIAGNOSIS — G89.29 CHRONIC LOW BACK PAIN: Primary | ICD-10-CM

## 2023-08-30 DIAGNOSIS — Z98.1 S/P LUMBAR FUSION: Primary | ICD-10-CM

## 2023-08-30 DIAGNOSIS — M54.50 CHRONIC LOW BACK PAIN: Primary | ICD-10-CM

## 2023-08-30 PROCEDURE — 99284 EMERGENCY DEPT VISIT MOD MDM: CPT

## 2023-08-30 PROCEDURE — 96372 THER/PROPH/DIAG INJ SC/IM: CPT

## 2023-08-30 PROCEDURE — 99282 EMERGENCY DEPT VISIT SF MDM: CPT

## 2023-08-30 PROCEDURE — 99214 OFFICE O/P EST MOD 30 MIN: CPT | Performed by: PHYSICIAN ASSISTANT

## 2023-08-30 RX ORDER — KETOROLAC TROMETHAMINE 30 MG/ML
15 INJECTION, SOLUTION INTRAMUSCULAR; INTRAVENOUS ONCE
Status: COMPLETED | OUTPATIENT
Start: 2023-08-30 | End: 2023-08-30

## 2023-08-30 RX ORDER — NAPROXEN 500 MG/1
500 TABLET ORAL 2 TIMES DAILY WITH MEALS
Qty: 30 TABLET | Refills: 0 | Status: SHIPPED | OUTPATIENT
Start: 2023-08-30

## 2023-08-30 RX ORDER — ACETAMINOPHEN 325 MG/1
975 TABLET ORAL ONCE
Status: COMPLETED | OUTPATIENT
Start: 2023-08-30 | End: 2023-08-30

## 2023-08-30 RX ORDER — METHOCARBAMOL 500 MG/1
500 TABLET, FILM COATED ORAL ONCE
Status: COMPLETED | OUTPATIENT
Start: 2023-08-30 | End: 2023-08-30

## 2023-08-30 RX ORDER — LIDOCAINE 50 MG/G
1 PATCH TOPICAL ONCE
Status: DISCONTINUED | OUTPATIENT
Start: 2023-08-30 | End: 2023-08-30 | Stop reason: HOSPADM

## 2023-08-30 RX ADMIN — LIDOCAINE 1 PATCH: 140 PATCH CUTANEOUS at 17:46

## 2023-08-30 RX ADMIN — METHOCARBAMOL 500 MG: 500 TABLET ORAL at 17:50

## 2023-08-30 RX ADMIN — ACETAMINOPHEN 975 MG: 325 TABLET, FILM COATED ORAL at 17:47

## 2023-08-30 RX ADMIN — KETOROLAC TROMETHAMINE 15 MG: 30 INJECTION, SOLUTION INTRAMUSCULAR; INTRAVENOUS at 17:46

## 2023-08-30 NOTE — PROGRESS NOTES
Neurosurgery Office Note  Lola Soria 79 y.o. male MRN: [id number]      Assessment/Plan     Status post lumbar spinal fusion  3 month follow up s/p T12-S1 posterior instrumented fixation fusion, L1-L2 and L4-5 bilateral laminectomy and total facetectomy, L2-3 and L3-4 bilateral laminectomy and medial facetectomy, L5-S1 bilateral laminoforaminotomy 4/11/23 by Dr. Frank Reeder for neurogenic claudication and radiculopathy  · Ongoing back pain with pain into posterior thighs. Known peripheral neuropathy. Self caths during the day. Ambulates with cane. · Participating in PT.  · Exam: Well-healed spinal incision, low back midline spinal TTP, RLE 4/5, LLE 4 -/5, except DF/PF 4/5, LT intact, 1+ DTRs, no clonus    Imaging:  · XR lumbar 8/28/23: Stable appearance of posterior fusion T12-S1    Plan:  · Reviewed x-ray with patient, wife, and Dr. Frank Reeder. His hardware is stable with adequate decompression of his spinal cord/nerves after surgery, though continues with chronic pain in his back and lower extremities. · No neurosurgical invention recommended at this time. · Agree with PT to continue working on core and back strengthening as well as lower extremities. · Recommend following up with PCP for trial of medications to help with his pain. Would also return to pain management. · Follow up in 3-4 months as joint appointment with Dr. Frank Reeder. Will need flex/ex XR prior. · Call sooner with any questions or concerns. Diagnoses and all orders for this visit:    S/P lumbar fusion  -     XR spine lumbar complete w bending minimum 6 views;  Future          I have spent a total time of 40 minutes on 08/30/23 in caring for this patient including Diagnostic results, Instructions for management, Patient and family education, Risk factor reductions, Impressions, Counseling / Coordination of care, Documenting in the medical record, Reviewing / ordering tests, medicine, procedures  , Obtaining or reviewing history   and Communicating with other healthcare professionals . CHIEF COMPLAINT    Chief Complaint   Patient presents with   • Follow-up       HISTORY    History of Present Illness     79y.o. year old male     79-year-old gentleman seen for 3-month follow-up status post T12-S1 posterior instrumented fixation fusion, L1-2 and L4-5 bilateral laminectomy and total facetectomy, L2-3 and L3-4 bilateral laminectomy medial facetectomy, L5-S1 bilateral laminoforaminotomy 4/11/23 by Dr. Jeff Srinivasan for neurogenic claudication and radiculopathy. He has not noted significant improvement since surgery. Patient and wife note that he feels weak from the waist down. He is still having significant pain, constant 8/10 in his back as well as 8/10 pain radiating down the buttocks and posterior thighs stopping at the knees. He describes the pain as cramping/spasms as well as sharp. The left side is worse than the right. He is not sure of any involvement in the feet or calves as he has underlying peripheral neuropathy. The pain is worse with walking and at night. He ambulates with a cane and his wife, though feels unsteady. He self caths 3 times a day. Tylenol and Advil has not been providing much relief, though Robaxin did help a little bit. He is doing outpatient PT twice weekly. Has not seen pain management since surgery. He is intermittently wearing an LSO brace. See Discussion    REVIEW OF SYSTEMS    Review of Systems   Constitutional: Positive for appetite change (getting better). HENT: Negative. Eyes: Negative. Respiratory: Negative. Cardiovascular: Negative. Gastrointestinal: Negative. Some pain with BM, hurts around coccyx to sit   Endocrine: Negative. Genitourinary: Positive for difficulty urinating (self cath does empty enough).         Self cath problems with prostate   Musculoskeletal: Positive for back pain (lower back pain radiates side to sides down both buttock and all the way down his legs to ankles constant pain dull achey feeling to severe ), gait problem (using cane, 2 falls since last seen hit back) and myalgias (muscle pain/tightness/spams in back ). Neck pain: sometimes feels a little pain         6 WK POV,  L1-S1 JANY POST DECOMP/FUSION- W XRAY     rates pain today 10/10 MID to LBP w occasional b/l Leg pain   6 WK POV,  L1-S1 JANY POST DECOMP/FUSION- W XRAY   rates pain today 10/10 MID to LBP w occasional b/l Leg pain   patient is asking for Diluadi refill   pt did not complete xray    Skin: Negative. Allergic/Immunologic: Negative. Neurological: Positive for weakness (sometimes in BLE, L>R) and numbness (sometimes in his feet from neuropathy  and sometimes in his arms ). Hematological: Negative. Psychiatric/Behavioral: Positive for sleep disturbance (pain keeping him up at night). All other systems reviewed and are negative. ROS obtained by MA. Reviewed. See HPI.      Meds/Allergies     Current Outpatient Medications   Medication Sig Dispense Refill   • ACCU-CHEK FABIANO PLUS test strip 4 (four) times a day  1   • ACCU-CHEK FASTCLIX LANCETS MISC 4 (four) times a day  1   • acetaminophen (TYLENOL) 500 mg tablet Take 1 tablet (500 mg total) by mouth every 6 (six) hours as needed for mild pain 500 mg tablet  0   • CHOLECALCIFEROL PO Take 5,000 Units by mouth daily     • clonazePAM (KlonoPIN) 1 mg tablet Take 1 mg by mouth 2 (two) times a day & 3rd pill PRN     • finasteride (PROSCAR) 5 mg tablet Take 1 tablet (5 mg total) by mouth daily 90 tablet 3   • fluticasone (FLONASE) 50 mcg/act nasal spray 1 spray into each nostril 2 (two) times a day     • GNP Aspirin Low Dose 81 MG EC tablet Take 1 tablet (81 mg total) by mouth daily Do not start before April 25, 2023.  0   • hydrOXYzine pamoate (VISTARIL) 50 mg capsule Take 50 mg by mouth daily at bedtime     • ibuprofen (MOTRIN) 800 mg tablet Take 800 mg by mouth every 6 (six) hours as needed for mild pain     • Jardiance 10 MG TABS Take 10 mg by mouth every morning     • ketoconazole (NIZORAL) 2 % shampoo Apply 1 application. topically daily Use as directed  6   • Lantus SoloStar 100 units/mL SOPN Inject 30 Units under the skin every morning     • losartan-hydrochlorothiazide (HYZAAR) 100-25 MG per tablet Take 1 tablet by mouth every morning  0   • lovastatin (MEVACOR) 20 mg tablet Take 20 mg by mouth every morning  3   • metFORMIN (GLUCOPHAGE) 1000 MG tablet Take 1,000 mg by mouth 2 (two) times a day with meals      • methocarbamol (ROBAXIN) 750 mg tablet Take 1 tablet (750 mg total) by mouth every 6 (six) hours 45 tablet 0   • METOPROLOL SUCCINATE ER PO Take 75 mg by mouth every morning     • mirtazapine (REMERON) 45 MG tablet Take 45 mg by mouth daily at bedtime  1   • NIFEdipine (PROCARDIA XL) 30 mg 24 hr tablet Take 120 mg by mouth every morning Takes 90 mg and 30 mg =120 mg every AM     • NIFEdipine (PROCARDIA XL) 90 mg 24 hr tablet Take 120 mg by mouth every morning Takes 90 mg and 30 mg =120 mg every AM  3   • sertraline (ZOLOFT) 100 mg tablet Take 100 mg by mouth every morning     • sulfamethoxazole-trimethoprim (BACTRIM DS) 800-160 mg per tablet Take 1 tablet by mouth every 12 (twelve) hours for 10 days 20 tablet 0   • tamsulosin (FLOMAX) 0.4 mg Take 1 capsule (0.4 mg total) by mouth daily with dinner 90 capsule 3   • folic acid (FOLVITE) 1 mg tablet Take 2,000 mcg by mouth daily  6   • metoprolol succinate (TOPROL-XL) 50 mg 24 hr tablet TAKE 1 AND 1/2 tabkets BY MOUTH ONCE A DAY (Patient not taking: Reported on 8/15/2023)     • polyethylene glycol (Golytely) 4000 mL solution Take 4,000 mL by mouth once for 1 dose Take 4000 mL by mouth once for 1 dose. Use as directed 4000 mL 0     No current facility-administered medications for this visit.        Allergies   Allergen Reactions   • Abilify [Aripiprazole] Tremor     Shaking     • Cephalexin Rash   • Molds & Smuts Allergic Rhinitis   • Pregabalin Tremor     Lyrica - shaking feeling PAST HISTORY    Past Medical History:   Diagnosis Date   • Anxiety    • Arthritis    • Cancer ([address])    • Depression    • Diabetes mellitus ([address])    • Hypertension    • Liver disease    • Mitral valve prolapse    • PONV (postoperative nausea and vomiting)    • Thyroid disease        Past Surgical History:   Procedure Laterality Date   • COLONOSCOPY     • INCISION AND DRAINAGE OF WOUND Left 2022    Procedure: INCISION AND DRAINAGE (I&D) EXTREMITY;  Surgeon: Orlando Noyola DPM;  Location:  MAIN OR;  Service: Podiatry   • JOINT REPLACEMENT Left 2022    Left TSA   • MA ARTHRODESIS POSTERIOR/PSTLAT TQ 1NTRSPC LUMBAR Bilateral 2023    Procedure: L1-S1 navigated posterior decompression with instrumented fixation fusion;  Surgeon: Iver Merlin, MD;  Location:  MAIN OR;  Service: Neurosurgery   • THYROID SURGERY      remove cancer       Social History     Tobacco Use   • Smoking status: Former     Packs/day: 0.25     Types: Cigarettes     Quit date:      Years since quittin.6   • Smokeless tobacco: Never   • Tobacco comments:     quit    Vaping Use   • Vaping Use: Some days   Substance Use Topics   • Alcohol use: Yes     Comment: Socially   • Drug use: Yes     Frequency: 7.0 times per week     Types: Marijuana     Comment: Medical       Family History   Problem Relation Age of Onset   • No Known Problems Father    • No Known Problems Mother    • Colon cancer Neg Hx          Above history personally reviewed. EXAM    Vitals:Blood pressure 128/78, pulse 75, temperature 98 °F (36.7 °C), temperature source Temporal, resp. rate 17, height 5' 8" (1.727 m), SpO2 95 %. ,Body mass index is 27.22 kg/m². Physical Exam  Vitals reviewed. Constitutional:       General: He is awake. Appearance: Normal appearance. HENT:      Head: Normocephalic and atraumatic. Eyes:      Conjunctiva/sclera: Conjunctivae normal.   Cardiovascular:      Rate and Rhythm: Normal rate.    Pulmonary: Effort: Pulmonary effort is normal.   Musculoskeletal:      Comments: Mild low back midline spinal TTP  Well healed spinal incision   Skin:     General: Skin is warm and dry. Neurological:      Mental Status: He is alert and oriented to person, place, and time. Deep Tendon Reflexes:      Reflex Scores:       Patellar reflexes are 1+ on the right side and 1+ on the left side. Achilles reflexes are 1+ on the right side and 1+ on the left side. Psychiatric:         Attention and Perception: Attention and perception normal.         Mood and Affect: Mood and affect normal.         Speech: Speech normal.         Behavior: Behavior normal. Behavior is cooperative. Thought Content: Thought content normal.         Cognition and Memory: Cognition and memory normal.         Judgment: Judgment normal.         Neurologic Exam     Mental Status   Oriented to person, place, and time. Follows 2 step commands. Attention: normal. Concentration: normal.   Speech: speech is normal   Level of consciousness: alert  Knowledge: good. Normal comprehension. Motor Exam   Muscle bulk: normal  Overall muscle tone: normal  RLE - 4/5  LLE -4-/5, except DF/PF 4/5     Sensory Exam   Right leg light touch: normal  Left leg light touch: normal    Gait, Coordination, and Reflexes     Tremor   Resting tremor: absent  Intention tremor: absent  Action tremor: absent    Reflexes   Right patellar: 1+  Left patellar: 1+  Right achilles: 1+  Left achilles: 1+  Right ankle clonus: absent  Left ankle clonus: absent  In wheelchair  Wearing LSO brace         MEDICAL DECISION MAKING    Imaging Studies:     XR spine lumbar 2 or 3 views injury    Result Date: 8/29/2023  Narrative: LUMBAR SPINE INDICATION:  Z98.1: Arthrodesis status. COMPARISON: 5/24/2023 VIEWS:  XR SPINE LUMBAR 2 OR 3 VIEWS INJURY Images: 3 FINDINGS: Stable appearance of posterior dorota and screw fusion T12-S1.  Hardware appears intact without surrounding lucencies. There are 5 non rib bearing lumbar vertebral bodies. There is no evidence of acute fracture or destructive osseous lesion. Unchanged alignment. Moderately advanced multilevel degenerative changes with discogenic disease most pronounced at L4-5 and L5-S1. Bulky bridging paravertebral osteophytes seen anteriorly. Endplate irregularity at L1-2 is grossly stable, better depicted on prior study. Soft tissues are unremarkable. Mild degenerative changes of the hips noted. Impression: Stable appearance of posterior fusion T12-S1. Workstation performed: Alfredo Ram scrotum and testicles    Result Date: 8/29/2023  Narrative: SCROTAL ULTRASOUND INDICATION: N50.82: Scrotal pain. Patient with right testicular/groin pain for 4 days. As per review of electronic medical record, recent urology note from 8/29/2023, right-sided epididymoorchitis suspected on physical exam. COMPARISON: CT of the abdomen and pelvis from 4/19/2023. TECHNIQUE: Ultrasound the scrotal contents was performed with a high frequency linear transducer utilizing volumetric sweep imaging as well as standard still image techniques. Imaging performed in longitudinal and transverse orientation. Color and spectral Doppler evaluation also performed bilaterally. FINDINGS: TESTES: The testes are grossly symmetric and normal in size. RIGHT testis measures  4.8 x 3.2 x 3.0 cm with a volume of 24.0 mL. Normal contour with homogeneous smooth echotexture. No intratesticular mass lesion or calcifications. LEFT testis measures 4.8 x 2.3 x 2.9 cm with a volume of 16.6 mL. Normal contour with homogeneous smooth echotexture. No intratesticular mass lesion or calcifications. Doppler flow within both testes is present. The right testis is hyperemic. EPIDIDYMIDES: The right epididymis is enlarged, measuring 1.9 x 1.7 x 1.4 cm compared to 0.9 x 1.1 x 1.0 cm on the left. The right epididymis is hyperemic and heterogeneous, especially in the region of the tail. There is a 5 x 4 x 4 mm complex left epididymal cyst. HYDROCELE:  No significant fluid present. VARICOCELE:  None present. SCROTUM: There is right-sided scrotal wall thickening. No evidence for extratesticular mass or hernia demonstrated. Impression: Right-sided scrotal wall thickening with hyperemic right testis, and hyperemic, heterogeneous and enlarged right epididymis, most suggestive of right-sided epididymoorchitis. Complex 5 x 4 x 4 mm left epididymal cyst. The study was marked in EPIC for significant notification. Workstation performed: [id number]       I have personally reviewed pertinent reports.    and I have personally reviewed pertinent films in PACS

## 2023-08-30 NOTE — ED PROVIDER NOTES
History  Chief Complaint   Patient presents with   • Back Pain     Patient presents to the ED with c/o back pain, states surgery in march and was told by surgeon that they will not prescribe him pain meds after 6 weeks after surgery. States pain has never gone away and has been dealing with it, states appt with Dr Shi Marie in September but cannot tolerate the pain until then      This is a 80 y/o male with PMH DM with associated diabetic neuropathy, HTN, depression, anxiety, chronic back pain s/p spinal fusion 3 months ago. He presents to the ER today for chronic back pain worsening since his surgery 3 months ago. He actually had a visit with his neurosurgeon today and they said everything is healing well but they were unable to prescribe him any medications for his pain greater than 6 weeks after the surgery and advised him to see pain management. Patient reports since the surgery he has had constant pain in his low back that radiates down his hips to behind his knees. Admits to weakness in his legs that he also states has been since the surgery. The pain and weakness are not new symptoms. He denies any fevers, chills, new numbness or new weakness, pelvic numbness, bowel or bladder incontinence, headaches, abdominal pain, nausea, vomiting. he self-catheterizes for an enlarged prostate which he also states has been going on since prior to the surgery. He states he takes ibuprofen and tylenol for it but its not helping. He has an appointment scheduled with pain management but its not until 2 weeks from now. History provided by:  Patient   used: No        Prior to Admission Medications   Prescriptions Last Dose Informant Patient Reported? Taking?    ACCU-CHEK FABIANO PLUS test strip  Spouse/Significant Other Yes No   Si (four) times a day   ACCU-CHEK FASTCLIX LANCETS MISC  Spouse/Significant Other Yes No   Si (four) times a day   CHOLECALCIFEROL PO  Spouse/Significant Other Yes No   Sig: Take 5,000 Units by mouth daily   GNP Aspirin Low Dose 81 MG EC tablet  Spouse/Significant Other Yes No   Sig: Take 1 tablet (81 mg total) by mouth daily Do not start before 2023. Jardiance 10 MG TABS  Spouse/Significant Other Yes No   Sig: Take 10 mg by mouth every morning   Lantus SoloStar 100 units/mL SOPN  Spouse/Significant Other Yes No   Sig: Inject 30 Units under the skin every morning   METOPROLOL SUCCINATE ER PO  Spouse/Significant Other Yes No   Sig: Take 75 mg by mouth every morning   NIFEdipine (PROCARDIA XL) 30 mg 24 hr tablet  Spouse/Significant Other Yes No   Sig: Take 120 mg by mouth every morning Takes 90 mg and 30 mg =120 mg every AM   NIFEdipine (PROCARDIA XL) 90 mg 24 hr tablet  Spouse/Significant Other Yes No   Sig: Take 120 mg by mouth every morning Takes 90 mg and 30 mg =120 mg every AM   acetaminophen (TYLENOL) 500 mg tablet  Spouse/Significant Other No No   Sig: Take 1 tablet (500 mg total) by mouth every 6 (six) hours as needed for mild pain 500 mg tablet   clonazePAM (KlonoPIN) 1 mg tablet  Spouse/Significant Other Yes No   Sig: Take 1 mg by mouth 2 (two) times a day & 3rd pill PRN   finasteride (PROSCAR) 5 mg tablet  Spouse/Significant Other No No   Sig: Take 1 tablet (5 mg total) by mouth daily   fluticasone (FLONASE) 50 mcg/act nasal spray  Spouse/Significant Other Yes No   Si spray into each nostril 2 (two) times a day   folic acid (FOLVITE) 1 mg tablet  Spouse/Significant Other Yes No   Sig: Take 2,000 mcg by mouth daily   hydrOXYzine pamoate (VISTARIL) 50 mg capsule  Spouse/Significant Other Yes No   Sig: Take 50 mg by mouth daily at bedtime   ibuprofen (MOTRIN) 800 mg tablet  Spouse/Significant Other Yes No   Sig: Take 800 mg by mouth every 6 (six) hours as needed for mild pain   ketoconazole (NIZORAL) 2 % shampoo  Spouse/Significant Other Yes No   Sig: Apply 1 application.  topically daily Use as directed   losartan-hydrochlorothiazide (HYZAAR) 100-25 MG per tablet Spouse/Significant Other Yes No   Sig: Take 1 tablet by mouth every morning   lovastatin (MEVACOR) 20 mg tablet  Spouse/Significant Other Yes No   Sig: Take 20 mg by mouth every morning   metFORMIN (GLUCOPHAGE) 1000 MG tablet  Spouse/Significant Other Yes No   Sig: Take 1,000 mg by mouth 2 (two) times a day with meals    methocarbamol (ROBAXIN) 750 mg tablet  Spouse/Significant Other No No   Sig: Take 1 tablet (750 mg total) by mouth every 6 (six) hours   metoprolol succinate (TOPROL-XL) 50 mg 24 hr tablet  Spouse/Significant Other Yes No   Sig: TAKE 1 AND 1/2 tabkets BY MOUTH ONCE A DAY   Patient not taking: Reported on 8/15/2023   mirtazapine (REMERON) 45 MG tablet  Spouse/Significant Other Yes No   Sig: Take 45 mg by mouth daily at bedtime   polyethylene glycol (Golytely) 4000 mL solution  Spouse/Significant Other No No   Sig: Take 4,000 mL by mouth once for 1 dose Take 4000 mL by mouth once for 1 dose.  Use as directed   sertraline (ZOLOFT) 100 mg tablet  Spouse/Significant Other Yes No   Sig: Take 100 mg by mouth every morning   sulfamethoxazole-trimethoprim (BACTRIM DS) 800-160 mg per tablet   No No   Sig: Take 1 tablet by mouth every 12 (twelve) hours for 10 days   tamsulosin (FLOMAX) 0.4 mg  Spouse/Significant Other No No   Sig: Take 1 capsule (0.4 mg total) by mouth daily with dinner      Facility-Administered Medications: None       Past Medical History:   Diagnosis Date   • Anxiety    • Arthritis    • Cancer ([address])    • Depression    • Diabetes mellitus ([address])    • Hypertension    • Liver disease    • Mitral valve prolapse    • PONV (postoperative nausea and vomiting)    • Thyroid disease        Past Surgical History:   Procedure Laterality Date   • COLONOSCOPY     • INCISION AND DRAINAGE OF WOUND Left 08/12/2022    Procedure: INCISION AND DRAINAGE (I&D) EXTREMITY;  Surgeon: Riccardo Cabrera DPM;  Location:  MAIN OR;  Service: Podiatry   • JOINT REPLACEMENT Left 03/11/2022    Left TSA   • OK ARTHRODESIS POSTERIOR/PSTLAT TQ 1NTRSPC LUMBAR Bilateral 2023    Procedure: L1-S1 navigated posterior decompression with instrumented fixation fusion;  Surgeon: Carmencita Koyanagi, MD;  Location:  MAIN OR;  Service: Neurosurgery   • THYROID SURGERY      remove cancer       Family History   Problem Relation Age of Onset   • No Known Problems Father    • No Known Problems Mother    • Colon cancer Neg Hx      I have reviewed and agree with the history as documented. E-Cigarette/Vaping   • E-Cigarette Use Current Some Day User    • Comments medical marijuana - occ      E-Cigarette/Vaping Substances   • Nicotine No    • THC No    • CBD No    • Flavoring No    • Other No    • Unknown No      Social History     Tobacco Use   • Smoking status: Former     Packs/day: 0.25     Types: Cigarettes     Quit date:      Years since quittin.6   • Smokeless tobacco: Never   • Tobacco comments:     quit    Vaping Use   • Vaping Use: Some days   Substance Use Topics   • Alcohol use: Yes     Comment: Socially   • Drug use: Yes     Frequency: 7.0 times per week     Types: Marijuana     Comment: Medical       Review of Systems   Constitutional: Negative for chills and fever. Respiratory: Negative for shortness of breath. Cardiovascular: Negative for chest pain. Gastrointestinal: Negative for abdominal pain, nausea and vomiting. Genitourinary: Negative for difficulty urinating. Musculoskeletal: Positive for back pain. Skin: Negative for color change. Neurological: Positive for weakness (chronic) and numbness (chronic). Negative for dizziness, light-headedness and headaches. Psychiatric/Behavioral: Negative for behavioral problems and sleep disturbance. All other systems reviewed and are negative. Physical Exam  Physical Exam  Vitals and nursing note reviewed. Constitutional:       General: He is awake. Appearance: Normal appearance. He is well-developed.    HENT:      Head: Normocephalic and atraumatic. Right Ear: External ear normal.      Left Ear: External ear normal.      Nose: Nose normal.   Eyes:      General: No scleral icterus. Extraocular Movements: Extraocular movements intact. Cardiovascular:      Rate and Rhythm: Normal rate and regular rhythm. Pulses:           Radial pulses are 2+ on the right side and 2+ on the left side. Dorsalis pedis pulses are 2+ on the right side and 2+ on the left side. Heart sounds: Normal heart sounds, S1 normal and S2 normal. No murmur heard. No gallop. Pulmonary:      Effort: Pulmonary effort is normal.      Breath sounds: Normal breath sounds. No wheezing, rhonchi or rales. Musculoskeletal:         General: Normal range of motion. Cervical back: Normal range of motion. Back:       Comments: Midline lumbar spinal pain - no step-offs, deformities, ecchymosis, erythema. Incision clear, dry, intact. Wearing LSO brace   Skin:     General: Skin is warm and dry. Neurological:      General: No focal deficit present. Mental Status: He is alert and oriented to person, place, and time. Sensory: Sensory deficit present. Motor: Motor function is intact. Comments: Patient has chronic neuropathy from ankles down with decreased sensation. No new changes in sensation. 4/5 strength bilaterally. Psychiatric:         Attention and Perception: Attention and perception normal.         Mood and Affect: Mood normal.         Behavior: Behavior normal. Behavior is cooperative.          Vital Signs  ED Triage Vitals   Temperature Pulse Respirations Blood Pressure SpO2   08/30/23 1617 08/30/23 1617 08/30/23 1617 08/30/23 1617 08/30/23 1617   98.7 °F (37.1 °C) 80 18 119/60 98 %      Temp Source Heart Rate Source Patient Position - Orthostatic VS BP Location FiO2 (%)   08/30/23 1617 08/30/23 1617 08/30/23 1645 08/30/23 1645 --   Temporal Monitor Sitting Right arm       Pain Score       08/30/23 1617       10 - Worst Possible Pain           Vitals:    08/30/23 1617 08/30/23 1645 08/30/23 1800   BP: 119/60 122/60 137/95   Pulse: 80 72 77   Patient Position - Orthostatic VS:  Sitting Sitting         Visual Acuity  Visual Acuity    Flowsheet Row Most Recent Value   L Pupil Size (mm) 3   R Pupil Size (mm) 3          ED Medications  Medications   ketorolac (TORADOL) injection 15 mg (15 mg Intramuscular Given 8/30/23 1746)   acetaminophen (TYLENOL) tablet 975 mg (975 mg Oral Given 8/30/23 1747)   methocarbamol (ROBAXIN) tablet 500 mg (500 mg Oral Given 8/30/23 1750)       Diagnostic Studies  Results Reviewed     None                 No orders to display              Procedures  Procedures         ED Course                               SBIRT 22yo+    Flowsheet Row Most Recent Value   Initial Alcohol Screen: US AUDIT-C     1. How often do you have a drink containing alcohol? 0 Filed at: 08/30/2023 1614   2. How many drinks containing alcohol do you have on a typical day you are drinking? 0 Filed at: 08/30/2023 1614   3a. Male UNDER 65: How often do you have five or more drinks on one occasion? 0 Filed at: 08/30/2023 1614   3b. FEMALE Any Age, or MALE 65+: How often do you have 4 or more drinks on one occassion? 0 Filed at: 08/30/2023 1614   Audit-C Score 0 Filed at: 08/30/2023 1614   JARON: How many times in the past year have you. .. Used an illegal drug or used a prescription medication for non-medical reasons? Never Filed at: 08/30/2023 1614                    Medical Decision Making  80 y/o male here for chronic low back pain  Clinical diagnosis - symptoms chronic. Vitals stable. No new complaints indicating need for imaging/labwork  Plan: patient saw his neurosurgeon today - he has been having constant pain, weakness since his surgery 5 months ago. He is not here in the ER for any changes. He came here for uncontrolled pain after they were unable to prescribe him any medications.  Per chart review on 4/22/23 he had a CMP done which showed a GFR of 94. Patient was given toradol, robaxin, tylenol and lidocaine patch in ER. Said his pain was much better. Sent prescription for naproxen and advised proper pain medication, keep appointment with pain management. He  was given strict return to ER precautions both verbally and in discharge papers. Patient verbalized understanding and agrees with plan. Risk  OTC drugs. Prescription drug management. Disposition  Final diagnoses:   Chronic low back pain     Time reflects when diagnosis was documented in both MDM as applicable and the Disposition within this note     Time User Action Codes Description Comment    8/30/2023  6:33 PM Cherylin Ports Add [M54.50,  G89.29] Acute exacerbation of chronic low back pain     8/30/2023  6:33 PM Cherylin Ports Remove [M54.50,  G89.29] Acute exacerbation of chronic low back pain     8/30/2023  6:33 PM Cherylin Ports Add [H35.38,  G89.29] Chronic low back pain       ED Disposition     ED Disposition   Discharge    Condition   Stable    Date/Time   Wed Aug 30, 2023  6:33 PM    Comment   Benita Wyatt discharge to home/self care.                Follow-up Information     Follow up With Specialties Details Why Contact Info Additional Information    Jeferson Crum,  Pain Medicine Schedule an appointment as soon as possible for a visit   [address] [facility]  [address] [facility] [address]        [address] [facility] Emergency Department Emergency Medicine Go to  if you develop intense back pain that is worse or different than before, cant feel or move your legs, new numbness, weakness, develop pelvic numbness, bowel or bladder incontinence, fevers, chest pain, shortness of breath [address] [facility] Emergency Department, [address] [facility], [address] [place] [address] Rd,[address]          Discharge Medication List as of 8/30/2023  6:35 PM      START taking these medications    Details   naproxen (Naprosyn) 500 mg tablet Take 1 tablet (500 mg total) by mouth 2 (two) times a day with meals, Starting Wed 8/30/2023, Normal         CONTINUE these medications which have NOT CHANGED    Details   ACCU-CHEK FABIANO PLUS test strip 4 (four) times a day, Starting Mon 12/17/2018, Historical Med      ACCU-CHEK FASTCLIX LANCETS MISC 4 (four) times a day, Starting Sat 12/22/2018, Historical Med      acetaminophen (TYLENOL) 500 mg tablet Take 1 tablet (500 mg total) by mouth every 6 (six) hours as needed for mild pain 500 mg tablet, Starting Sat 4/22/2023, OTC      CHOLECALCIFEROL PO Take 5,000 Units by mouth daily, Historical Med      clonazePAM (KlonoPIN) 1 mg tablet Take 1 mg by mouth 2 (two) times a day & 3rd pill PRN, Starting Mon 10/24/2022, Historical Med      finasteride (PROSCAR) 5 mg tablet Take 1 tablet (5 mg total) by mouth daily, Starting Tue 7/18/2023, Normal      fluticasone (FLONASE) 50 mcg/act nasal spray 1 spray into each nostril 2 (two) times a day, Historical Med      GNP Aspirin Low Dose 81 MG EC tablet Take 1 tablet (81 mg total) by mouth daily Do not start before April 25, 2023., Starting Tue 4/25/2023, Historical Med      hydrOXYzine pamoate (VISTARIL) 50 mg capsule Take 50 mg by mouth daily at bedtime, Starting Sat 7/2/2022, Historical Med      ibuprofen (MOTRIN) 800 mg tablet Take 800 mg by mouth every 6 (six) hours as needed for mild pain, Historical Med      Jardiance 10 MG TABS Take 10 mg by mouth every morning, Starting Wed 7/20/2022, Historical Med      ketoconazole (NIZORAL) 2 % shampoo Apply 1 application.  topically daily Use as directed, Starting Wed 10/10/2018, Historical Med      Lantus SoloStar 100 units/mL SOPN Inject 30 Units under the skin every morning, Starting Sun 7/10/2022, Historical Med      losartan-hydrochlorothiazide (HYZAAR) 100-25 MG per tablet Take 1 tablet by mouth every morning, Starting Mon 12/17/2018, Historical Med lovastatin (MEVACOR) 20 mg tablet Take 20 mg by mouth every morning, Starting Mon 12/17/2018, Historical Med      metFORMIN (GLUCOPHAGE) 1000 MG tablet Take 1,000 mg by mouth 2 (two) times a day with meals , Starting Wed 1/2/2019, Historical Med      methocarbamol (ROBAXIN) 750 mg tablet Take 1 tablet (750 mg total) by mouth every 6 (six) hours, Starting Sat 4/22/2023, Normal      !! METOPROLOL SUCCINATE ER PO Take 75 mg by mouth every morning, Historical Med      mirtazapine (REMERON) 45 MG tablet Take 45 mg by mouth daily at bedtime, Starting Tue 12/11/2018, Historical Med      NIFEdipine (PROCARDIA XL) 30 mg 24 hr tablet Take 120 mg by mouth every morning Takes 90 mg and 30 mg =120 mg every AM, Starting Sun 7/10/2022, Historical Med      NIFEdipine (PROCARDIA XL) 90 mg 24 hr tablet Take 120 mg by mouth every morning Takes 90 mg and 30 mg =120 mg every AM, Starting Mon 12/17/2018, Historical Med      sertraline (ZOLOFT) 100 mg tablet Take 100 mg by mouth every morning, Historical Med      sulfamethoxazole-trimethoprim (BACTRIM DS) 800-160 mg per tablet Take 1 tablet by mouth every 12 (twelve) hours for 10 days, Starting Tue 8/29/2023, Until Fri 9/8/2023, Normal      tamsulosin (FLOMAX) 0.4 mg Take 1 capsule (0.4 mg total) by mouth daily with dinner, Starting Tue 2/37/2803, Normal      folic acid (FOLVITE) 1 mg tablet Take 2,000 mcg by mouth daily, Starting Mon 12/17/2018, Historical Med      !! metoprolol succinate (TOPROL-XL) 50 mg 24 hr tablet TAKE 1 AND 1/2 tabkets BY MOUTH ONCE A DAY, Historical Med      polyethylene glycol (Golytely) 4000 mL solution Take 4,000 mL by mouth once for 1 dose Take 4000 mL by mouth once for 1 dose. Use as directed, Starting Wed 5/3/2023, Normal       !! - Potential duplicate medications found. Please discuss with provider. No discharge procedures on file.     PDMP Review       Value Time User    PDMP Reviewed  Yes 4/22/2023  6:39 AM Katja Diaz PA-C          ED Provider  Electronically Signed by           Rosanne Dean PA-C  08/30/23 0418

## 2023-08-30 NOTE — DISCHARGE INSTRUCTIONS
Take 500 mg robaxin every 12 hours for the next 5 days  Take ibuprofen or tylenol every 4-6 hours for pain. Can alternate them every 3 hours as needed   Take naproxen 500 mg every 12 hours. Note that if you are taking this medication, you cannot also take tylenol. Rest, use heating pads, ice on your back.  Avoid heavy lifting for atleast a week  Lidocaine patches for 12 hours on and 12 hours off   Follow up with pain management  Return to the ER if you develop intense back pain that is worse or different than before, cant feel or move your legs, new numbness, weakness, develop pelvic numbness, bowel or bladder incontinence, fevers, chest pain, shortness of breath

## 2023-08-30 NOTE — ASSESSMENT & PLAN NOTE
3 month follow up s/p T12-S1 posterior instrumented fixation fusion, L1-L2 and L4-5 bilateral laminectomy and total facetectomy, L2-3 and L3-4 bilateral laminectomy and medial facetectomy, L5-S1 bilateral laminoforaminotomy 4/11/23 by Dr. Mina Conley for neurogenic claudication and radiculopathy  · Ongoing back pain with pain into posterior thighs. Known peripheral neuropathy. Self caths during the day. Ambulates with cane. · Participating in PT.  · Exam: Well-healed spinal incision, low back midline spinal TTP, RLE 4/5, LLE 4 -/5, except DF/PF 4/5, LT intact, 1+ DTRs, no clonus    Imaging:  · XR lumbar 8/28/23: Stable appearance of posterior fusion T12-S1    Plan:  · Reviewed x-ray with patient, wife, and Dr. Mina Conley. His hardware is stable with adequate decompression of his spinal cord/nerves after surgery, though continues with chronic pain in his back and lower extremities. · No neurosurgical invention recommended at this time. · Agree with PT to continue working on core and back strengthening as well as lower extremities. · Recommend following up with PCP for trial of medications to help with his pain. Would also return to pain management. · Follow up in 3-4 months as joint appointment with Dr. Mina Conley. Will need flex/ex XR prior. · Call sooner with any questions or concerns.

## 2023-08-30 NOTE — TELEPHONE ENCOUNTER
"Ok to speak with Tatyana, per pt. Advised pt, the medical communication consent of 8/22/23 does not indicate ok to leave a detailed message or Ok to speak with anyone other than the pt - signed by the pt. Advised pt, if he would like that changed, please review his electronic chart via my chart.     Per Tatyana, pt has been referred back to pain management per Dr. Moraes as [facility] does not prescribe medication beyond 6 weeks after surgery. Advised Tatyana, there have been changes to the pt's medical / surgical history that will need to be reviewed before this office will discuss medications. The writer will request that the  staff shea the pt's appt, \"call if sooner\". Would recommend that the pt go to the er for pain 10/10. Tatyana verbalized understanding    SPA clerical staff:  Please shea the pt's appt - call if sooner.   "

## 2023-08-30 NOTE — TELEPHONE ENCOUNTER
Caller: Juan San     Doctor: Dr Barrera    Reason for call: Patient wife calling asking if  can be prescribed something for pain. Patient wife states pain level 10/10 and patient has Re-eval appt scheduled please advise     Call back#: [phone]

## 2023-08-31 ENCOUNTER — APPOINTMENT (OUTPATIENT)
Dept: PHYSICAL THERAPY | Facility: CLINIC | Age: 68
End: 2023-08-31
Payer: COMMERCIAL

## 2023-08-31 LAB — BACTERIA UR CULT: ABNORMAL

## 2023-09-01 ENCOUNTER — TELEPHONE (OUTPATIENT)
Dept: UROLOGY | Facility: AMBULATORY SURGERY CENTER | Age: 68
End: 2023-09-01

## 2023-09-01 DIAGNOSIS — N30.00 ACUTE CYSTITIS WITHOUT HEMATURIA: Primary | ICD-10-CM

## 2023-09-01 RX ORDER — CIPROFLOXACIN 500 MG/1
500 TABLET, FILM COATED ORAL 2 TIMES DAILY
Qty: 10 TABLET | Refills: 0 | Status: SHIPPED | OUTPATIENT
Start: 2023-09-01 | End: 2023-09-06

## 2023-09-01 NOTE — TELEPHONE ENCOUNTER
Patient with BPH and urinary retention. Performing CIC. Urine culture positive for E. coli. Cipro sent to pharmacy. Please call patient and have him start antibiotics today.

## 2023-09-01 NOTE — TELEPHONE ENCOUNTER
Wife called to ask if  should remain on Bactrium and I read the notes from both Dr. Kate Carranza and Dr. Kathy Bagley.

## 2023-09-05 ENCOUNTER — OFFICE VISIT (OUTPATIENT)
Dept: URGENT CARE | Facility: CLINIC | Age: 68
End: 2023-09-05
Payer: COMMERCIAL

## 2023-09-05 ENCOUNTER — APPOINTMENT (OUTPATIENT)
Dept: PHYSICAL THERAPY | Facility: CLINIC | Age: 68
End: 2023-09-05
Payer: COMMERCIAL

## 2023-09-05 VITALS
HEART RATE: 84 BPM | RESPIRATION RATE: 16 BRPM | HEIGHT: 68 IN | SYSTOLIC BLOOD PRESSURE: 144 MMHG | TEMPERATURE: 98.2 F | WEIGHT: 174 LBS | DIASTOLIC BLOOD PRESSURE: 60 MMHG | BODY MASS INDEX: 26.37 KG/M2 | OXYGEN SATURATION: 98 %

## 2023-09-05 DIAGNOSIS — S39.012A STRAIN OF MUSCLE, FASCIA AND TENDON OF LOWER BACK, INITIAL ENCOUNTER: Primary | ICD-10-CM

## 2023-09-05 PROCEDURE — 99213 OFFICE O/P EST LOW 20 MIN: CPT | Performed by: PHYSICIAN ASSISTANT

## 2023-09-05 PROCEDURE — G0463 HOSPITAL OUTPT CLINIC VISIT: HCPCS | Performed by: PHYSICIAN ASSISTANT

## 2023-09-05 NOTE — PATIENT INSTRUCTIONS
Patient was educated to call PCP and ask if steroids is an option due to having diabetes. Patient was told any loss of bowel or bladder control go to ED. Any increased pain go to ED. Back Pain   WHAT YOU NEED TO KNOW:   Back pain is common. You may have back pain and muscle spasms. You may feel sore or stiff on one or both sides of your back. The pain may spread to your lower body. Conditions that affect the spine, joints, or muscles can cause back pain. These may include arthritis, spinal stenosis (narrowing of the spinal column), muscle tension, or breakdown of the spinal discs. DISCHARGE INSTRUCTIONS:   Call your local emergency number (911 in the [address]) if:   You have severe back pain with chest pain. You cannot control your urine or bowel movements. Your pain becomes so severe that you cannot walk. Return to the emergency department if:   You have pain, numbness, or weakness in one or both legs. You have severe back pain, nausea, and vomiting. You have severe back pain that spreads to your side or genital area. Call your doctor if:   You have back pain that does not get better with rest and pain medicine. You have a fever. You have pain that worsens when you are on your back or when you rest.    You have pain that worsens when you cough or sneeze. You lose weight without trying. You have questions or concerns about your condition or care. Medicines: You may need any of the following:  NSAIDs  help decrease swelling and pain or fever. This medicine is available with or without a doctor's order. NSAIDs can cause stomach bleeding or kidney problems in certain people. If you take blood thinner medicine, always ask your healthcare provider if NSAIDs are safe for you. Always read the medicine label and follow directions. Acetaminophen  decreases pain and fever. It is available without a doctor's order. Ask how much to take and how often to take it. Follow directions.  Read the labels of all other medicines you are using to see if they also contain acetaminophen, or ask your doctor or pharmacist. Acetaminophen can cause liver damage if not taken correctly. Muscle relaxers  help decrease muscle spasms and back pain. Prescription pain medicine  may be given. Ask your healthcare provider how to take this medicine safely. Some prescription pain medicines contain acetaminophen. Do not take other medicines that contain acetaminophen without talking to your healthcare provider. Too much acetaminophen may cause liver damage. Prescription pain medicine may cause constipation. Ask your healthcare provider how to prevent or treat constipation. Take your medicine as directed. Contact your healthcare provider if you think your medicine is not helping or if you have side effects. Tell your provider if you are allergic to any medicine. Keep a list of the medicines, vitamins, and herbs you take. Include the amounts, and when and why you take them. Bring the list or the pill bottles to follow-up visits. Carry your medicine list with you in case of an emergency. How to manage your back pain:   Apply ice  on your back for 15 to 20 minutes every hour or as directed. Use an ice pack, or put crushed ice in a plastic bag. Cover it with a towel before you apply it to your skin. Ice helps prevent tissue damage and decreases pain. Apply heat  on your back for 20 to 30 minutes every 2 hours for as many days as directed. Heat helps decrease pain and muscle spasms. Stay active  as much as you can without causing more pain. Bed rest could make your back pain worse. Avoid heavy lifting until your pain is gone. Go to physical therapy  as directed. A physical therapist can teach you exercises to help improve movement and strength, and to decrease pain. Follow up with your doctor in 2 weeks, or as directed:   You might need to see a specialist. Write down your questions so you remember to ask them during your visits. © Copyright [employer] 2022 Information is for End User's use only and may not be sold, redistributed or otherwise used for commercial purposes. The above information is an  only. It is not intended as medical advice for individual conditions or treatments. Talk to your doctor, nurse or pharmacist before following any medical regimen to see if it is safe and effective for you.

## 2023-09-05 NOTE — PROGRESS NOTES
[facility]        NAME: Emery Nails is a 79 y.o. male  : 1955    MRN: [id number]  DATE: 2023  TIME: 6:58 PM    Assessment and Plan   Strain of muscle, fascia and tendon of lower back, initial encounter [S29.238U]  1. Strain of muscle, fascia and tendon of lower back, initial encounter  Ambulatory referral to Spine & Pain Management          Most recent blood sugar I could see was 23  and it was 285  Patient Instructions     Patient was educated to call PCP and ask if steroids is an option due to having diabetes. Patient was told any loss of bowel or bladder control go to ED. Any increased pain go to ED. Chief Complaint     Chief Complaint   Patient presents with   • Back Pain     Pt reports bilateral low lumbar back pain with radiation into buttocks, hips, and legs with onset several weeks ago. Managing symptoms with back brace, robaxin, massage, and heat application without much relief. Denies any injury. History of Present Illness       Patient is here today with wife complaining of back pain and leg pain for months. Patient reports 5 months ago he had a spinal fusion and still has pain. Patient is currently taking Robaxin every 4-6 hours with no relief. Patient reports no loss of bowel or bladder control. Patient is currently in formal PT. Patient reports he saw his neurologist who said they could not prescribe anything for pain and referred him to pain management. Patient reports pain management appointment is on 23. Patient also went to ED on 23 and they gave him a shot of Toradol with no relief and robaxin. Patient is a diabetic. Patient wears a back brace and ambulates with a cane. Review of Systems   Review of Systems   Constitutional: Negative. Respiratory: Negative. Cardiovascular: Negative. Musculoskeletal: Positive for back pain. Psychiatric/Behavioral: Negative.           Current Medications       Current Outpatient Medications:   •  ACCU-CHEK FABIANO PLUS test strip, 4 (four) times a day, Disp: , Rfl: 1  •  ACCU-CHEK FASTCLIX LANCETS MISC, 4 (four) times a day, Disp: , Rfl: 1  •  acetaminophen (TYLENOL) 500 mg tablet, Take 1 tablet (500 mg total) by mouth every 6 (six) hours as needed for mild pain 500 mg tablet, Disp: , Rfl: 0  •  ciprofloxacin (CIPRO) 500 mg tablet, Take 1 tablet (500 mg total) by mouth 2 (two) times a day for 5 days, Disp: 10 tablet, Rfl: 0  •  clonazePAM (KlonoPIN) 1 mg tablet, Take 1 mg by mouth 2 (two) times a day & 3rd pill PRN, Disp: , Rfl:   •  finasteride (PROSCAR) 5 mg tablet, Take 1 tablet (5 mg total) by mouth daily, Disp: 90 tablet, Rfl: 3  •  fluticasone (FLONASE) 50 mcg/act nasal spray, 1 spray into each nostril 2 (two) times a day, Disp: , Rfl:   •  folic acid (FOLVITE) 1 mg tablet, Take 2,000 mcg by mouth daily, Disp: , Rfl: 6  •  GNP Aspirin Low Dose 81 MG EC tablet, Take 1 tablet (81 mg total) by mouth daily Do not start before April 25, 2023., Disp: , Rfl: 0  •  hydrOXYzine pamoate (VISTARIL) 50 mg capsule, Take 50 mg by mouth daily at bedtime, Disp: , Rfl:   •  ibuprofen (MOTRIN) 800 mg tablet, Take 800 mg by mouth every 6 (six) hours as needed for mild pain, Disp: , Rfl:   •  Jardiance 10 MG TABS, Take 10 mg by mouth every morning, Disp: , Rfl:   •  ketoconazole (NIZORAL) 2 % shampoo, Apply 1 application.  topically daily Use as directed, Disp: , Rfl: 6  •  Lantus SoloStar 100 units/mL SOPN, Inject 30 Units under the skin every morning, Disp: , Rfl:   •  losartan-hydrochlorothiazide (HYZAAR) 100-25 MG per tablet, Take 1 tablet by mouth every morning, Disp: , Rfl: 0  •  lovastatin (MEVACOR) 20 mg tablet, Take 20 mg by mouth every morning, Disp: , Rfl: 3  •  metFORMIN (GLUCOPHAGE) 1000 MG tablet, Take 1,000 mg by mouth 2 (two) times a day with meals , Disp: , Rfl:   •  methocarbamol (ROBAXIN) 750 mg tablet, Take 1 tablet (750 mg total) by mouth every 6 (six) hours, Disp: 45 tablet, Rfl: 0  • METOPROLOL SUCCINATE ER PO, Take 75 mg by mouth every morning, Disp: , Rfl:   •  mirtazapine (REMERON) 45 MG tablet, Take 45 mg by mouth daily at bedtime, Disp: , Rfl: 1  •  naproxen (Naprosyn) 500 mg tablet, Take 1 tablet (500 mg total) by mouth 2 (two) times a day with meals, Disp: 30 tablet, Rfl: 0  •  NIFEdipine (PROCARDIA XL) 30 mg 24 hr tablet, Take 120 mg by mouth every morning Takes 90 mg and 30 mg =120 mg every AM, Disp: , Rfl:   •  NIFEdipine (PROCARDIA XL) 90 mg 24 hr tablet, Take 120 mg by mouth every morning Takes 90 mg and 30 mg =120 mg every AM, Disp: , Rfl: 3  •  sertraline (ZOLOFT) 100 mg tablet, Take 100 mg by mouth every morning, Disp: , Rfl:   •  tamsulosin (FLOMAX) 0.4 mg, Take 1 capsule (0.4 mg total) by mouth daily with dinner, Disp: 90 capsule, Rfl: 3  •  CHOLECALCIFEROL PO, Take 5,000 Units by mouth daily, Disp: , Rfl:   •  metoprolol succinate (TOPROL-XL) 50 mg 24 hr tablet, TAKE 1 AND 1/2 tabkets BY MOUTH ONCE A DAY (Patient not taking: Reported on 8/15/2023), Disp: , Rfl:   •  polyethylene glycol (Golytely) 4000 mL solution, Take 4,000 mL by mouth once for 1 dose Take 4000 mL by mouth once for 1 dose.  Use as directed, Disp: 4000 mL, Rfl: 0  •  sulfamethoxazole-trimethoprim (BACTRIM DS) 800-160 mg per tablet, Take 1 tablet by mouth every 12 (twelve) hours for 10 days (Patient not taking: Reported on 9/5/2023), Disp: 20 tablet, Rfl: 0    Current Allergies     Allergies as of 09/05/2023 - Reviewed 09/05/2023   Allergen Reaction Noted   • Abilify [aripiprazole] Tremor 07/29/2022   • Cephalexin Rash 04/27/2021   • Molds & smuts Allergic Rhinitis 01/11/2019   • Pregabalin Tremor 01/26/2023            The following portions of the patient's history were reviewed and updated as appropriate: allergies, current medications, past family history, past medical history, past social history, past surgical history and problem list.     Past Medical History:   Diagnosis Date   • Anxiety    • Arthritis • Cancer [facility])    • Depression    • Diabetes mellitus ([address] )    • Hypertension    • Liver disease    • Mitral valve prolapse    • PONV (postoperative nausea and vomiting)    • Thyroid disease        Past Surgical History:   Procedure Laterality Date   • COLONOSCOPY     • INCISION AND DRAINAGE OF WOUND Left 08/12/2022    Procedure: INCISION AND DRAINAGE (I&D) EXTREMITY;  Surgeon: Buffy Higgins DPM;  Location:  MAIN OR;  Service: Podiatry   • JOINT REPLACEMENT Left 03/11/2022    Left TSA   • ME ARTHRODESIS POSTERIOR/PSTLAT TQ 1NTRSPC LUMBAR Bilateral 04/11/2023    Procedure: L1-S1 navigated posterior decompression with instrumented fixation fusion;  Surgeon: Lor Ansari MD;  Location:  MAIN OR;  Service: Neurosurgery   • THYROID SURGERY  2021    remove cancer       Family History   Problem Relation Age of Onset   • No Known Problems Father    • No Known Problems Mother    • Colon cancer Neg Hx          Medications have been verified. Objective   /60 (BP Location: Right arm, Patient Position: Sitting)   Pulse 84   Temp 98.2 °F (36.8 °C)   Resp 16   Ht 5' 8" (1.727 m)   Wt 78.9 kg (174 lb)   SpO2 98%   BMI 26.46 kg/m²   No LMP for male patient. Physical Exam     Physical Exam  Vitals and nursing note reviewed. Constitutional:       Appearance: Normal appearance. HENT:      Head: Normocephalic. Cardiovascular:      Rate and Rhythm: Normal rate and regular rhythm. Heart sounds: Normal heart sounds. Pulmonary:      Breath sounds: Normal breath sounds. Musculoskeletal:      Comments: Well healed back incision. Pain over lumbar spine and lumbar para-spinals. Lower body motor intact with pain and mild weakness. Pain over right and left calf. Left and right calf are soft to touch. Negative SLR in right and left leg   Neurological:      General: No focal deficit present. Mental Status: He is alert and oriented to person, place, and time.    Psychiatric:         Mood and Affect: Mood normal.         Behavior: Behavior normal.

## 2023-09-07 ENCOUNTER — TELEPHONE (OUTPATIENT)
Dept: PAIN MEDICINE | Facility: CLINIC | Age: 68
End: 2023-09-07

## 2023-09-07 NOTE — TELEPHONE ENCOUNTER
LVM to verify reason for appt with Dr Darron Gillis. Does the patient need to be seen for lower back?     Appt notes state F/U re-referred for PM

## 2023-09-12 ENCOUNTER — APPOINTMENT (OUTPATIENT)
Dept: PHYSICAL THERAPY | Facility: CLINIC | Age: 68
End: 2023-09-12
Payer: COMMERCIAL

## 2023-09-13 ENCOUNTER — TELEPHONE (OUTPATIENT)
Age: 68
End: 2023-09-13

## 2023-09-13 ENCOUNTER — OFFICE VISIT (OUTPATIENT)
Dept: PAIN MEDICINE | Facility: CLINIC | Age: 68
End: 2023-09-13
Payer: COMMERCIAL

## 2023-09-13 VITALS
TEMPERATURE: 98.2 F | BODY MASS INDEX: 26.07 KG/M2 | SYSTOLIC BLOOD PRESSURE: 146 MMHG | WEIGHT: 172 LBS | HEIGHT: 68 IN | DIASTOLIC BLOOD PRESSURE: 68 MMHG | HEART RATE: 77 BPM

## 2023-09-13 DIAGNOSIS — G89.4 CHRONIC PAIN SYNDROME: ICD-10-CM

## 2023-09-13 DIAGNOSIS — F10.21 ALCOHOLISM IN REMISSION (HCC): ICD-10-CM

## 2023-09-13 DIAGNOSIS — Z79.899 MEDICAL MARIJUANA USE: ICD-10-CM

## 2023-09-13 DIAGNOSIS — Z98.1 STATUS POST LUMBAR SPINAL FUSION: Primary | ICD-10-CM

## 2023-09-13 DIAGNOSIS — M79.2 NEUROPATHIC PAIN: ICD-10-CM

## 2023-09-13 PROCEDURE — 99214 OFFICE O/P EST MOD 30 MIN: CPT | Performed by: ANESTHESIOLOGY

## 2023-09-13 RX ORDER — GABAPENTIN 300 MG/1
CAPSULE ORAL
Qty: 120 CAPSULE | Refills: 0 | Status: SHIPPED | OUTPATIENT
Start: 2023-09-13

## 2023-09-13 NOTE — PROGRESS NOTES
Assessment  1. Status post lumbar spinal fusion    2. Medical marijuana use    3. Chronic pain syndrome    4. Alcoholism in remission ([address])    5. Neuropathic pain        Plan    Patient is a pleasant 82-year-old gentleman who is status post T12-S1 posterior instrumented fixation fusion with multilevel bilateral laminectomy and facetectomy. He reports that he is in significant and severe pain. Has been taking Tylenol and ibuprofen. He would not be a candidate for a peripheral spinal cord stimulator trial but in the future he may be a candidate for an open trial however he is only 4 months postop. Currently rates pain 10 on 10 the visual as well as constant he cannot rest and hardly walks with pain. [place] any loss of bowel bladder function. In the past he was on Lyrica and developed side effects. He was on gabapentin but he was on a low dose and felt this was not effective. At this point time I will start him on a slow and low titrating dose of gabapentin to maximum effect. I did review the potential side effects of gabapentin, not limited to, but including dizziness, drowsiness, weakness, tired feeling, nausea, diarrhea, constipation, blurred vision, headache, swelling, dry mouth, or loss of balance or coordination. We will follow-up in approximately 3 weeks time for reevaluation. My impressions and treatment recommendations were discussed in detail with the patient who verbalized understanding and had no further questions. Discharge instructions were provided. I personally saw and examined the patient and I agree with the above discussed plan of care. This note is created using dictation transcription. It may contain typographical errors, grammatical errors, improperly dictated words, background noise and other errors.       New Medications Ordered This Visit   Medications   • gabapentin (NEURONTIN) 300 mg capsule     Sig: Take 1 tablet at bedtime for 6 days, then 1 tablet 2 times daily for 6 days then 1 tablet 3 times a day for 6 days then one tablet 4 times a day     Dispense:  120 capsule     Refill:  0       History of Present Illness    Namita Wynne is a 79 y.o. male status post T12-S1 posterior instrumented fixation fusion with multilevel bilateral laminectomy and facetectomy. He reports significant pain. His function has decreased he believes since the surgery. He has ongoing pain into his back posterior thighs. I have personally reviewed and/or updated the patient's past medical history, past surgical history, family history, social history, current medications, allergies, and vital signs today. Review of Systems   Constitutional: Negative for fever and unexpected weight change. HENT: Negative for trouble swallowing. Eyes: Negative for visual disturbance. Respiratory: Negative for shortness of breath and wheezing. Cardiovascular: Negative for chest pain and palpitations. Gastrointestinal: Positive for constipation and nausea. Negative for diarrhea and vomiting. Endocrine: Negative for cold intolerance, heat intolerance and polydipsia. Genitourinary: Negative for difficulty urinating and frequency. Musculoskeletal: Positive for gait problem and myalgias. Negative for arthralgias and joint swelling (joint stiffness). Skin: Negative for rash. Neurological: Negative for dizziness, seizures, syncope, weakness and headaches. Hematological: Does not bruise/bleed easily. Psychiatric/Behavioral: Negative for dysphoric mood. All other systems reviewed and are negative.       Patient Active Problem List   Diagnosis   • Alcoholism in remission ([address])   • Benzodiazepine dependence ([address])   • Bilateral adhesive capsulitis of shoulders   • Bronchitis, mucopurulent recurrent (HCC)   • Cirrhosis, alcoholic ([address])   • Diabetic peripheral neuropathy ([address])   • DM (diabetes mellitus) ([address])   • Herniated nucleus pulposus of lumbosacral region   • Hypercholesterolemia   • Lumbar spondylosis   • Thyroid cancer (HCC)   • Liver disease   • Chronic pain syndrome   • Chronic bilateral low back pain without sciatica   • PONV (postoperative nausea and vomiting)   • Medical marijuana use   • Urinary retention   • Anemia   • Hyponatremia   • Gallstones   • Status post lumbar spinal fusion   • Benign prostatic hyperplasia with urinary retention   • Scrotal pain   • Lower urinary tract symptoms       Past Medical History:   Diagnosis Date   • Anxiety    • Arthritis    • Cancer ([address])    • Depression    • Diabetes mellitus ([address])    • Hypertension    • Liver disease    • Mitral valve prolapse    • PONV (postoperative nausea and vomiting)    • Thyroid disease        Past Surgical History:   Procedure Laterality Date   • COLONOSCOPY     • INCISION AND DRAINAGE OF WOUND Left 2022    Procedure: INCISION AND DRAINAGE (I&D) EXTREMITY;  Surgeon: Brittni Alex DPM;  Location:  MAIN OR;  Service: Podiatry   • JOINT REPLACEMENT Left 2022    Left TSA   • SD ARTHRODESIS POSTERIOR/PSTLAT TQ 1NTRSPC LUMBAR Bilateral 2023    Procedure: L1-S1 navigated posterior decompression with instrumented fixation fusion;  Surgeon: Brianna Rivera MD;  Location:  MAIN OR;  Service: Neurosurgery   • THYROID SURGERY      remove cancer       Family History   Problem Relation Age of Onset   • No Known Problems Father    • No Known Problems Mother    • Colon cancer Neg Hx        Social History     Occupational History   • Not on file   Tobacco Use   • Smoking status: Former     Packs/day: 0.25     Types: Cigarettes     Quit date:      Years since quittin.7   • Smokeless tobacco: Never   • Tobacco comments:     quit    Vaping Use   • Vaping Use: Some days   Substance and Sexual Activity   • Alcohol use: Yes     Comment: Socially   • Drug use: Yes     Frequency: 7.0 times per week     Types: Marijuana     Comment: Medical   • Sexual activity: Not Currently       Current Outpatient Medications on File Prior to Visit   Medication Sig   • ACCU-CHEK FABIANO PLUS test strip 4 (four) times a day   • ACCU-CHEK FASTCLIX LANCETS MISC 4 (four) times a day   • acetaminophen (TYLENOL) 500 mg tablet Take 1 tablet (500 mg total) by mouth every 6 (six) hours as needed for mild pain 500 mg tablet   • clonazePAM (KlonoPIN) 1 mg tablet Take 1 mg by mouth 2 (two) times a day & 3rd pill PRN   • finasteride (PROSCAR) 5 mg tablet Take 1 tablet (5 mg total) by mouth daily   • fluticasone (FLONASE) 50 mcg/act nasal spray 1 spray into each nostril 2 (two) times a day   • folic acid (FOLVITE) 1 mg tablet Take 2,000 mcg by mouth daily   • GNP Aspirin Low Dose 81 MG EC tablet Take 1 tablet (81 mg total) by mouth daily Do not start before April 25, 2023. • hydrOXYzine pamoate (VISTARIL) 50 mg capsule Take 50 mg by mouth daily at bedtime   • ibuprofen (MOTRIN) 800 mg tablet Take 800 mg by mouth every 6 (six) hours as needed for mild pain   • Jardiance 10 MG TABS Take 10 mg by mouth every morning   • ketoconazole (NIZORAL) 2 % shampoo Apply 1 application.  topically daily Use as directed   • Lantus SoloStar 100 units/mL SOPN Inject 30 Units under the skin every morning   • losartan-hydrochlorothiazide (HYZAAR) 100-25 MG per tablet Take 1 tablet by mouth every morning   • lovastatin (MEVACOR) 20 mg tablet Take 20 mg by mouth every morning   • metFORMIN (GLUCOPHAGE) 1000 MG tablet Take 1,000 mg by mouth 2 (two) times a day with meals    • methocarbamol (ROBAXIN) 750 mg tablet Take 1 tablet (750 mg total) by mouth every 6 (six) hours   • METOPROLOL SUCCINATE ER PO Take 75 mg by mouth every morning   • mirtazapine (REMERON) 45 MG tablet Take 45 mg by mouth daily at bedtime   • NIFEdipine (PROCARDIA XL) 30 mg 24 hr tablet Take 120 mg by mouth every morning Takes 90 mg and 30 mg =120 mg every AM   • NIFEdipine (PROCARDIA XL) 90 mg 24 hr tablet Take 120 mg by mouth every morning Takes 90 mg and 30 mg =120 mg every AM   • sertraline (ZOLOFT) 100 mg tablet Take 100 mg by mouth every morning   • tamsulosin (FLOMAX) 0.4 mg Take 1 capsule (0.4 mg total) by mouth daily with dinner   • CHOLECALCIFEROL PO Take 5,000 Units by mouth daily   • metoprolol succinate (TOPROL-XL) 50 mg 24 hr tablet TAKE 1 AND 1/2 tabkets BY MOUTH ONCE A DAY (Patient not taking: Reported on 8/15/2023)   • naproxen (Naprosyn) 500 mg tablet Take 1 tablet (500 mg total) by mouth 2 (two) times a day with meals (Patient not taking: Reported on 9/13/2023)   • polyethylene glycol (Golytely) 4000 mL solution Take 4,000 mL by mouth once for 1 dose Take 4000 mL by mouth once for 1 dose. Use as directed   • [DISCONTINUED] ARIPiprazole (ABILIFY) 30 mg tablet Take 30 mg by mouth daily at bedtime (Patient not taking: Reported on 7/29/2022)   • [DISCONTINUED] DIGOX 250 MCG tablet TAKE 1 TABLET BY MOUTH EVERY DAY BUT DO NOT TAKE ON SUNDAY OR WEDNESDAY (Patient not taking: Reported on 7/29/2022)   • [DISCONTINUED] glipiZIDE (GLUCOTROL XL) 10 mg 24 hr tablet Take 10 mg by mouth 2 (two) times a day. Indications: Type 2 Diabetes   • [DISCONTINUED] HUMULIN N 100 UNIT/ML subcutaneous injection INJECT 40 UNITS IN THE MORNING AND 6 UNITS in THE IN THE EVENING AS DIRECTED (Patient not taking: Reported on 8/9/2022)   • [DISCONTINUED] ondansetron (ZOFRAN) 4 mg tablet Take 1 tablet (4 mg total) by mouth every 6 (six) hours as needed for nausea or vomiting (Patient not taking: Reported on 7/29/2022)   • [DISCONTINUED] propranolol (INDERAL LA) 160 mg Take 160 mg by mouth daily     No current facility-administered medications on file prior to visit.        Allergies   Allergen Reactions   • Abilify [Aripiprazole] Tremor     Shaking     • Cephalexin Rash   • Molds & Smuts Allergic Rhinitis   • Pregabalin Tremor     Lyrica - shaking feeling       Physical Exam    /68 (BP Location: Left arm, Patient Position: Sitting, Cuff Size: Standard)   Pulse 77   Temp 98.2 °F (36.8 °C)   Ht 5' 8" (1.727 m)   Wt 78 kg (172 lb) BMI 26.15 kg/m²     Constitutional: normal, well developed, well nourished, alert, in no distress and non-toxic and no overt pain behavior. Eyes: anicteric  HEENT: grossly intact  Neck: supple, symmetric, trachea midline and no masses   Pulmonary:even and unlabored  Cardiovascular:No edema or pitting edema present  Skin:Normal without rashes or lesions and well hydrated  Psychiatric:Mood and affect appropriate  Neurologic:Cranial Nerves II-XII grossly intact  Musculoskeletal:normal and antalgic. Extreme difficulty going from sitting to standing sitting position well-healed surgical scar without signs of erythema, deep tendon reflexes diminished but symmetrical bilateral patella and Achilles    Imaging  LUMBAR SPINE @  8-28-23     INDICATION:  Z98.1: Arthrodesis status.     COMPARISON: 5/24/2023     VIEWS:  XR SPINE LUMBAR 2 OR 3 VIEWS INJURY  Images: 3     FINDINGS:     Stable appearance of posterior dorota and screw fusion T12-S1. Hardware appears intact without surrounding lucencies.     There are 5 non rib bearing lumbar vertebral bodies.     There is no evidence of acute fracture or destructive osseous lesion.     Unchanged alignment.     Moderately advanced multilevel degenerative changes with discogenic disease most pronounced at L4-5 and L5-S1. Bulky bridging paravertebral osteophytes seen anteriorly. Endplate irregularity at L1-2 is grossly stable, better depicted on prior study.     Soft tissues are unremarkable.     Mild degenerative changes of the hips noted.     IMPRESSION:     Stable appearance of posterior fusion T12-S1. CT LUMBAR SPINE @  4-19-23     INDICATION:   Postoperative leukocytosis.     COMPARISON: 12/2/2022 4/11/2023     TECHNIQUE:  Contiguous axial images through the lumbar spine were obtained. Sagittal and coronal reconstructions were performed.       IV Contrast: 100 mL of iohexol (OMNIPAQUE)     Radiation dose length product (DLP) for this visit:  1911 mGy-cm .   This examination, like all CT scans performed in the [facility], was performed utilizing techniques to minimize radiation dose exposure, including the use of iterative   reconstruction and automated exposure control.       IMAGE QUALITY:  Diagnostic.     FINDINGS:     ALIGNMENT:  There are 5 lumbar type vertebral bodies. Postsurgical change from T12 to S1 posterior fusion and laminectomies     VERTEBRAE:  Diffuse sclerosis throughout the lumbar vertebral bodies. Stable to mildly increased erosive changes in the inferior L1 and superior L2 endplates. No evidence of fracture.     DEGENERATIVE CHANGES:     Lower Thoracic spine:  Stable disc degenerative change without bony central canal stenosis.     L1-2:  Decompression of the central canal.  Facet osteophytosis. Moderate to severe bilateral neural foraminal narrowing.     L2-3:  Decompression of the central canal.  Mild bilateral neural foraminal narrowing.     L3-4:  Normal decompression of the central canal.  Mild right bony neural foraminal narrowing.     L4-5:  Posterior disc osteophytes and disc bulge. Decompression of the central canal.  Facet osteophytosis. At least moderate bilateral neural foraminal narrowing.     L5-S1:  Posterior disc and uncovertebral osteophytes. Facet osteophytosis. No central canal stenosis. Severe right and moderate left neural foraminal narrowing.     PARASPINAL SOFT TISSUES:  Inflammation and gas in the posterior paraspinal soft tissues overlying the surgical bed. Epidural soft tissues not clearly visualized due to extensive artifact.     IMPRESSION:        1. Postsurgical change from posterior T12-S1 fusion and lumbar laminectomies. Extensive artifact from fusion hardware limits evaluation of the soft tissues. 2.  Decompression of the bony central canal and the lumbar spine. Multilevel moderate to severe neural foraminal narrowing. 3.  Epidural space not clearly visualized.   Gas and inflammation in the posterior paraspinal soft tissues along the surgical bed. No evidence of organized fluid collection. MRI LUMBAR SPINE WITHOUT CONTRAST @  12-2-22     INDICATION: M47.816: Spondylosis without myelopathy or radiculopathy, lumbar region. Low back pain. Difficulty ambulating.     COMPARISON:  None.     TECHNIQUE:  Multiplanar, multisequence imaging of the lumbar spine was performed. .          IMAGE QUALITY:  Diagnostic     FINDINGS:     VERTEBRAL BODIES:  There are 5 lumbar type vertebral bodies. Marrow edema about the L1-2 intervertebral disc space noted. Additionally, there is marrow edema in the superior endplate of L3 where there is a Schmorl's node. No definite high T2 signal in   the L1-2 intervertebral disc space. Multilevel Schmorl's nodes noted elsewhere. Small hemangioma versus type II Modic endplate changes inferior endplate of B91. Mild levoscoliosis mid lumbar spine centered at the L3 level.     SACRUM:  Normal signal within the sacrum. No evidence of insufficiency or stress fracture.     DISTAL CORD AND CONUS:  Undulating redundancy of the roots of the cauda equina at the L1-2 level, likely on the basis of spinal stenosis. The conus medullaris terminates at the mid L1 level.     PARASPINAL SOFT TISSUES:  Paraspinal soft tissues are unremarkable.     LOWER THORACIC DISC SPACES:  Normal disc height and signal.  No disc herniation, canal stenosis or foraminal narrowing.     LUMBAR DISC SPACES:     L1-L2:  There is a central/left paracentral disc herniation, protrusion type. There is bilateral facet hypertrophy and infolding ligamentum flavum. Moderate to severe central canal narrowing. Severe left neural foraminal narrowing. Moderate right   neural foraminal narrowing.     L2-L3:  There is bilateral facet hypertrophy. There is infolding ligamentum flavum. There is a right neural foraminal disc protrusion. Mild to moderate right neural foraminal narrowing. Mild central canal narrowing.   Moderate left neural foraminal   narrowing.     L3-L4:  There is infolding ligamentum flavum. There is a right neural foraminal disc protrusion. Severe right neural foraminal narrowing. Moderate central canal narrowing. Moderate left neural foraminal narrowing.       L4-L5:  There is bilateral facet hypertrophy. There is loss of disc height and signal.  There is a left neural foraminal disc protrusion. Severe central canal left neural foraminal narrowing. Moderate right neural foraminal narrowing.     L5-S1:  There is loss of disc height and signal.  There is a right neural foraminal disc protrusion. Severe right neural foraminal narrowing. Mild central canal narrowing. Moderate to severe left neural foraminal narrowing.     OTHER FINDINGS:  None.     IMPRESSION:     1.  Multifocal marrow edema L1, L2 and superior endplate of L3 with a Schmorl's node indicative of type I Modic endplate changes with acute Schmorl's node. Discitis/osteomyelitis at L1-2 is thought to be less likely given the lack of T2 hyperintensity   within the intervertebral disc space. Does the patient have osteoporotic risk factors? Further clinical assessment recommended. 2.  Advanced multilevel spondylosis as described with multilevel disc herniations.

## 2023-09-13 NOTE — TELEPHONE ENCOUNTER
Pt has questions for the nurse that he forgot to ask in his visit this morning and would like to know if he can get a call back to ask those questions and he also wanted to know what its the highest mg he can go on ibuprofen. Pt can be reached at [phone].

## 2023-09-13 NOTE — TELEPHONE ENCOUNTER
S/w pt, confirmed he should not take more than 3,000 mg of tylenol per day and no more than 2,400 mg of ibuprofen per day. Pt verbalized understanding and appreciation. Will cb if additional questions or issues arise.

## 2023-09-19 ENCOUNTER — OFFICE VISIT (OUTPATIENT)
Dept: PHYSICAL THERAPY | Facility: CLINIC | Age: 68
End: 2023-09-19
Payer: COMMERCIAL

## 2023-09-19 DIAGNOSIS — M54.50 CHRONIC BILATERAL LOW BACK PAIN WITHOUT SCIATICA: ICD-10-CM

## 2023-09-19 DIAGNOSIS — M54.16 LUMBAR RADICULOPATHY: ICD-10-CM

## 2023-09-19 DIAGNOSIS — M47.816 LUMBAR SPONDYLOSIS: ICD-10-CM

## 2023-09-19 DIAGNOSIS — Z98.1 STATUS POST LUMBAR SPINAL FUSION: Primary | ICD-10-CM

## 2023-09-19 DIAGNOSIS — G89.29 CHRONIC BILATERAL LOW BACK PAIN WITHOUT SCIATICA: ICD-10-CM

## 2023-09-19 DIAGNOSIS — M48.062 SPINAL STENOSIS OF LUMBAR REGION WITH NEUROGENIC CLAUDICATION: ICD-10-CM

## 2023-09-19 PROCEDURE — 97140 MANUAL THERAPY 1/> REGIONS: CPT

## 2023-09-19 PROCEDURE — 97110 THERAPEUTIC EXERCISES: CPT

## 2023-09-19 NOTE — PROGRESS NOTES
Daily Note     Today's date: 2023  Patient name: Beau Kimbrough  : 1955  MRN: [id number]  Referring provider: Conchita Miller MD  Dx:   Encounter Diagnosis     ICD-10-CM    1. Status post lumbar spinal fusion  Z98.1       2. Chronic bilateral low back pain without sciatica  M54.50     G89.29       3. Spinal stenosis of lumbar region with neurogenic claudication  M48.062       4. Lumbar spondylosis  M47.816       5. Lumbar radiculopathy  M54.16                      Subjective: Patient reports pain in back and legs today      Objective: See treatment diary below      Assessment: Patient tolerated treatment well today. Required verbal cues for exercise technique. He continues to demonstrate decreased strength, endurance, and mobility.  Will benefit from skilled PT to address impairments and return to PLOF      Plan: progress as tolerated     Precautions: posterior fusion L1-S1 on 2023; hx of cancer; fall risk  Functional Limitations: walking, stairs, dressing, ADLs  Impairments: lumbar and hip ROM, BLE strength, TUG time  [facility] Code: [email or web address]   POC expiration: 2023      Manuals           STM lumbar paraspinals sitting  8' 8'          Bilateral hip PROM  4' 4'          Bilateral hip LAD   2'                       Neuro Re-Ed             hooklying TA nv  nv          hooklying TA / march             Standing pball press   10"x10                                                              Ther Ex             bike  5' for ROM  nv          LAQ nv  2# 2x10          Seated march HEP            Seated lumbar flexion HEP            LTR  15ea 10ea          SKTC             Standing 3 way hip nv            Standing knee flexion nv            Standing march             HR             Seated hamstring stretch  20"x2 20"x3 ea          Seated 3 way pball rollout   5"x5ea                       Leg press             Mini squat             Ther Activity             Fwd step up             Lateral step up             Gait Training                                       Modalities

## 2023-09-21 ENCOUNTER — OFFICE VISIT (OUTPATIENT)
Dept: PHYSICAL THERAPY | Facility: CLINIC | Age: 68
End: 2023-09-21
Payer: COMMERCIAL

## 2023-09-21 DIAGNOSIS — M54.16 LUMBAR RADICULOPATHY: ICD-10-CM

## 2023-09-21 DIAGNOSIS — M48.062 SPINAL STENOSIS OF LUMBAR REGION WITH NEUROGENIC CLAUDICATION: ICD-10-CM

## 2023-09-21 DIAGNOSIS — Z98.1 STATUS POST LUMBAR SPINAL FUSION: Primary | ICD-10-CM

## 2023-09-21 DIAGNOSIS — M54.50 CHRONIC BILATERAL LOW BACK PAIN WITHOUT SCIATICA: ICD-10-CM

## 2023-09-21 DIAGNOSIS — G89.29 CHRONIC BILATERAL LOW BACK PAIN WITHOUT SCIATICA: ICD-10-CM

## 2023-09-21 PROCEDURE — 97110 THERAPEUTIC EXERCISES: CPT | Performed by: PHYSICAL THERAPIST

## 2023-09-21 PROCEDURE — 97530 THERAPEUTIC ACTIVITIES: CPT | Performed by: PHYSICAL THERAPIST

## 2023-09-21 NOTE — PROGRESS NOTES
Daily Note     Today's date: 2023  Patient name: Clementina Joseph  : 1955  MRN: [id number]  Referring provider: Herlinda Joy MD  Dx:   Encounter Diagnosis     ICD-10-CM    1. Status post lumbar spinal fusion  Z98.1       2. Lumbar radiculopathy  M54.16       3. Chronic bilateral low back pain without sciatica  M54.50     G89.29       4. Spinal stenosis of lumbar region with neurogenic claudication  M48.062                      Subjective: Getting B/L LE NT sxs and tightness. Reports that his legs feel like "mush" and his body was taken apart and put together with "bubble gum". Objective: See treatment diary below      Assessment: Continues to have limited endurance with LE strength. With gradual facilitation with seated stretch and then working through AROM of quadriceps mms. With turning and transitions has LOB posterior. Poor ankle strategy and stepping strategy. Trial of manual perturbations with fair response and improved balance with repeated performance. Plan: Continue per plan of care.       Precautions: posterior fusion L1-S1 on 2023; hx of cancer; fall risk  Functional Limitations: walking, stairs, dressing, ADLs  Impairments: lumbar and hip ROM, BLE strength, TUG time  [facility] Code: [email or web address]   POC expiration: 2023      Manuals          STM lumbar paraspinals sitting  8' 8'          Bilateral hip PROM  4' 4'          Bilateral hip LAD   2'                       Neuro Re-Ed             hooklying TA nv  nv          hooklying TA w/ march             Standing pball press   10"x10          Std perturbations in romberg    3 min          STD rows with cues for balance     OTB 2x10                                   Ther Ex             bike  5' for ROM  nv          LAQ nv  2# 2x10 2x10 ea         Seated march HEP            Seated lumbar flexion HEP   3x15"         LTR  15ea 10ea          Sit to stand    Post retro mini squat 5x         SKTC             Side stepping    3 laps 10 ft at mirror         Standing 3 way hip nv            Standing knee flexion nv            Standing march             HR    20x         Seated hamstring stretch  20"x2 20"x3 ea 3x15" ea         Seated 3 way pball rollout   5"x5ea 5x5"                      Leg press             Mini squat    At mirrow with B/L UE support 2x10          Ther Activity             Manual push of therapist in // bars     4 laps 10 ft         Fwd step up             Lateral step up             Gait Training                                       Modalities

## 2023-09-26 ENCOUNTER — OFFICE VISIT (OUTPATIENT)
Dept: PHYSICAL THERAPY | Facility: CLINIC | Age: 68
End: 2023-09-26
Payer: COMMERCIAL

## 2023-09-26 DIAGNOSIS — M54.50 CHRONIC BILATERAL LOW BACK PAIN WITHOUT SCIATICA: ICD-10-CM

## 2023-09-26 DIAGNOSIS — M54.16 LUMBAR RADICULOPATHY: ICD-10-CM

## 2023-09-26 DIAGNOSIS — M48.062 SPINAL STENOSIS OF LUMBAR REGION WITH NEUROGENIC CLAUDICATION: ICD-10-CM

## 2023-09-26 DIAGNOSIS — M47.816 LUMBAR SPONDYLOSIS: Primary | ICD-10-CM

## 2023-09-26 DIAGNOSIS — G89.29 CHRONIC BILATERAL LOW BACK PAIN WITHOUT SCIATICA: ICD-10-CM

## 2023-09-26 DIAGNOSIS — Z98.1 STATUS POST LUMBAR SPINAL FUSION: ICD-10-CM

## 2023-09-26 PROCEDURE — 97530 THERAPEUTIC ACTIVITIES: CPT

## 2023-09-26 PROCEDURE — 97110 THERAPEUTIC EXERCISES: CPT

## 2023-09-26 NOTE — PROGRESS NOTES
Daily Note     Today's date: 2023  Patient name: Celestino Carlson  : 1955  MRN: [id number]  Referring provider: Aracelis Amaral MD  Dx:   Encounter Diagnosis     ICD-10-CM    1. Lumbar spondylosis  M47.816       2. Status post lumbar spinal fusion  Z98.1       3. Lumbar radiculopathy  M54.16       4. Chronic bilateral low back pain without sciatica  M54.50     G89.29       5. Spinal stenosis of lumbar region with neurogenic claudication  M48.062                      Subjective: Reports he was sore after last visit. Is doing a bit better today      Objective: See treatment diary below      Assessment: Patient was fatigued post treatment today. Able to regain balance when LOB with perturbations. Updated HEP given with stretches - did not add dynamic balance to HEP as a precaution for patient safety due to fall risk. Will benefit from skilled PT to continue to address BLE strength and endurance and balance      Plan: Continue per plan of care.       Precautions: posterior fusion L1-S1 on 2023; hx of cancer; fall risk  Functional Limitations: walking, stairs, dressing, ADLs  Impairments: lumbar and hip ROM, BLE strength, TUG time  MoPowered Code: [email or web address]   POC expiration: 2023      Manuals          STM lumbar paraspinals sitting  8' 8'          Bilateral hip PROM  4' 4'          Bilateral hip LAD   2'                       Neuro Re-Ed             hooklying TA nv  nv          hooklying TA / march             Standing pball press   10"x10          Std perturbations in romberg    3 min  3 min        STD rows with cues for balance     OTB 2x10 OTB 2x10                                  Ther Ex             bike  5' for ROM  nv          LAQ nv  2# 2x10 2x10 ea 2x10        Seated march HEP            Seated lumbar flexion HEP   3x15" 3x15"        LTR  15ea 10ea          Sit to stand    Post retro mini squat 5x         SKTC             Side stepping    3 laps 10 ft at mirror 3 laps Standing 3 way hip nv            Standing knee flexion nv            walking march     3 laps in // bars        HR    20x 20x        Seated hamstring stretch  20"x2 20"x3 ea 3x15" ea 3x15" ea        Seated 3 way pball rollout   5"x5ea 5x5" 5x5"                     Leg press             Mini squat    At mirrow with B/L UE support 2x10  2x10        Ther Activity             Manual push of therapist in // bars     4 laps 10 ft 3 laps        Fwd step up             Lateral step up             Gait Training                                       Modalities

## 2023-09-28 ENCOUNTER — OFFICE VISIT (OUTPATIENT)
Dept: PHYSICAL THERAPY | Facility: CLINIC | Age: 68
End: 2023-09-28
Payer: COMMERCIAL

## 2023-09-28 DIAGNOSIS — G89.29 CHRONIC BILATERAL LOW BACK PAIN WITHOUT SCIATICA: ICD-10-CM

## 2023-09-28 DIAGNOSIS — Z98.1 STATUS POST LUMBAR SPINAL FUSION: ICD-10-CM

## 2023-09-28 DIAGNOSIS — M54.50 CHRONIC BILATERAL LOW BACK PAIN WITHOUT SCIATICA: ICD-10-CM

## 2023-09-28 DIAGNOSIS — M47.816 LUMBAR SPONDYLOSIS: Primary | ICD-10-CM

## 2023-09-28 DIAGNOSIS — M48.062 SPINAL STENOSIS OF LUMBAR REGION WITH NEUROGENIC CLAUDICATION: ICD-10-CM

## 2023-09-28 DIAGNOSIS — M54.16 LUMBAR RADICULOPATHY: ICD-10-CM

## 2023-09-28 PROCEDURE — 97110 THERAPEUTIC EXERCISES: CPT | Performed by: PHYSICAL THERAPIST

## 2023-09-28 NOTE — PROGRESS NOTES
Daily Note     Today's date: 2023  Patient name: Margo Coronel  : 1955  MRN: [id number]  Referring provider: Sivan Ireland MD  Dx:   Encounter Diagnosis     ICD-10-CM    1. Lumbar spondylosis  M47.816       2. Status post lumbar spinal fusion  Z98.1       3. Lumbar radiculopathy  M54.16       4. Chronic bilateral low back pain without sciatica  M54.50     G89.29       5. Spinal stenosis of lumbar region with neurogenic claudication  M48.062                      Subjective: Feeling worse today at his low back. Objective: See treatment diary below      Assessment: Limited session due to tardiness upon arrival.  Noting poor tolerance to any Wbing TE today. Able to alleviate sxs with PPT performance over a bolster. Initiate of STD hip extension for PSM activation of L/S. Will benefit from continued TA stability training. Plan: Continue per plan of care.       Precautions: posterior fusion L1-S1 on 2023; hx of cancer; fall risk  Functional Limitations: walking, stairs, dressing, ADLs  Impairments: lumbar and hip ROM, BLE strength, TUG time  Swyft Code: [email or web address]   POC expiration: 2023      Manuals        STM lumbar paraspinals sitting  8' 8'          Bilateral hip PROM  4' 4'          Bilateral hip LAD   2'                       Neuro Re-Ed             hooklying TA nv  nv          hooklying TA / march      10x ea       Standing pball press   10"x10          Std perturbations in romberg    3 min  3 min        STD rows with cues for balance     OTB 2x10 OTB 2x10        TA arms OH      5# 2x10       Iso ADD/ABD with TA      10x3" ea       Ther Ex             bike  5' for ROM  nv          LAQ nv  2# 2x10 2x10 ea 2x10 2x10 ea       Seated march HEP            Seated lumbar flexion HEP   3x15" 3x15"        LTR  15ea 10ea          Sit to stand    Post retro mini squat 5x         SKTC             Side stepping    3 laps 10 ft at mirror 3 laps 3 laps Standing 3 way hip nv            Standing knee flexion nv            walking march     3 laps in // bars        HR    20x 20x 20x       Seated hamstring stretch  20"x2 20"x3 ea 3x15" ea 3x15" ea        Seated 3 way pball rollout   5"x5ea 5x5" 5x5"                     Leg press             Mini squat    At mirrow with B/L UE support 2x10  2x10        Ther Activity             Manual push of therapist in // bars     4 laps 10 ft 3 laps        Fwd step up             Lateral step up             Gait Training                                       Modalities

## 2023-10-02 ENCOUNTER — OFFICE VISIT (OUTPATIENT)
Dept: PAIN MEDICINE | Facility: CLINIC | Age: 68
End: 2023-10-02
Payer: COMMERCIAL

## 2023-10-02 VITALS
DIASTOLIC BLOOD PRESSURE: 68 MMHG | TEMPERATURE: 99.4 F | BODY MASS INDEX: 26.07 KG/M2 | SYSTOLIC BLOOD PRESSURE: 100 MMHG | WEIGHT: 172 LBS | HEIGHT: 68 IN

## 2023-10-02 DIAGNOSIS — M54.50 CHRONIC BILATERAL LOW BACK PAIN, UNSPECIFIED WHETHER SCIATICA PRESENT: ICD-10-CM

## 2023-10-02 DIAGNOSIS — M47.816 LUMBAR SPONDYLOSIS: ICD-10-CM

## 2023-10-02 DIAGNOSIS — G89.4 CHRONIC PAIN SYNDROME: ICD-10-CM

## 2023-10-02 DIAGNOSIS — M79.18 MYOFASCIAL PAIN SYNDROME: ICD-10-CM

## 2023-10-02 DIAGNOSIS — M79.2 NEUROPATHIC PAIN: ICD-10-CM

## 2023-10-02 DIAGNOSIS — G89.29 CHRONIC BILATERAL LOW BACK PAIN, UNSPECIFIED WHETHER SCIATICA PRESENT: ICD-10-CM

## 2023-10-02 DIAGNOSIS — M96.1 POSTLAMINECTOMY SYNDROME, LUMBAR: Primary | ICD-10-CM

## 2023-10-02 DIAGNOSIS — Z98.1 STATUS POST LUMBAR SPINAL FUSION: ICD-10-CM

## 2023-10-02 DIAGNOSIS — M54.16 LUMBAR RADICULOPATHY: ICD-10-CM

## 2023-10-02 PROCEDURE — 99214 OFFICE O/P EST MOD 30 MIN: CPT | Performed by: NURSE PRACTITIONER

## 2023-10-02 RX ORDER — GABAPENTIN 400 MG/1
CAPSULE ORAL
Qty: 120 CAPSULE | Refills: 3 | Status: SHIPPED | OUTPATIENT
Start: 2023-10-02

## 2023-10-02 RX ORDER — METHYLPREDNISOLONE 4 MG/1
TABLET ORAL
Qty: 1 EACH | Refills: 0 | Status: SHIPPED | OUTPATIENT
Start: 2023-10-02

## 2023-10-02 NOTE — PATIENT INSTRUCTIONS
Start steroid pack-check blood sugars frequently when taking the steroids    Increase gabapentin to 400 mg 1 pill 4 times a day    Continue Physical therapy

## 2023-10-02 NOTE — PROGRESS NOTES
Assessment:  1. Postlaminectomy syndrome, lumbar    2. Lumbar spondylosis    3. Myofascial pain syndrome    4. Chronic bilateral low back pain, unspecified whether sciatica present    5. Chronic pain syndrome    6. Status post lumbar spinal fusion    7. Neuropathic pain    8. Lumbar radiculopathy        Plan:  The patient is a 79 y.o. male last seen on 09/13/2023 who presents for a follow up office visit in regards to chronic pain secondary to low back pain, lumbar postlaminectomy syndrome-fusion T12-S1 April 1, 2023, lumbar foraminal stenosis, and lumbar radiculopathy. Patient presents today with ongoing low back and leg pain since his surgery. He is currently still attending physical therapy. He is using medical marijuana for the pain, along with gabapentin 300 mg 1 tab 4 times a day. Patient options are limited. He has an allergy to Lyrica, currently is on Cymbalta and has a history of alcoholic cirrhosis. Opioids should be avoided, and patient is not interested in taking them either. To help decrease pain and inflammation, I will start him on a medrol dose pack. He is to take it as directed on the package insert. He was told to avoid NSAIDS while taking the steroid. He is also diabetic (HGB A1C=6.3). I asked that he check his blood sugar regularly when taking the steroids. I will increase the gabapentin to 400 mg 1 tablet times a day. A prescription was sent to the pharmacy. The patient will follow-up in 12 weeks for medication prescription refill and reevaluation. The patient was advised to contact the office should their symptoms worsen in the interim. The patient was agreeable and verbalized an understanding. History of Present Illness:     The patient is a 79 y.o. male last seen on 09/13/2023 who presents for a follow up office visit in regards to chronic pain secondary to low back pain, lumbar postlaminectomy syndrome-fusion T12-S1 April 1, 2023, lumbar foraminal stenosis, and lumbar radiculopathy. His last office visit was September 13, 2023, in which the gabapentin 300 mg was increased to 4 times a day. Patient presents today with ongoing low back pain. He states the pain radiates up into his mid back, and also down into both legs. He feels weakness in his legs and is using a walker to ambulate. His pain is constant and described as burning, dull aching, sharp, throbbing, cramping, and pressure-like. He is rating his pain a 10/10 on the numeric rating scale. Patient is currently taking gabapentin 300 mg 1 tablet 4 times a day, which he states provides mild pain relief. He denies side effects or bowel or bladder issues. Patient also uses medical marijuana which he states dulls the sharp pains. Patient had a spinal cord stimulator trial in 1/26/23, which provided minimal back pain relief, but did help his legs pain. Spinal cord stimulation was also discussed at the last office visit, but he would need to have an open trial with neurosurgery due to his fusion starting at T12. I have personally reviewed and/or updated the patient's past medical history, past surgical history, family history, social history, current medications, allergies, and vital signs today. Review of Systems:    Review of Systems   Respiratory: Negative for shortness of breath. Cardiovascular: Negative for chest pain. Gastrointestinal: Negative for constipation, diarrhea, nausea and vomiting. Musculoskeletal: Positive for back pain, gait problem and joint swelling (joint stiffness). Negative for arthralgias and myalgias. Decreased ROM  B/L LE Pain   Skin: Negative for rash. Neurological: Negative for dizziness, seizures and weakness. All other systems reviewed and are negative.         Past Medical History:   Diagnosis Date   • Anxiety    • Arthritis    • Cancer ([address])    • Depression    • Diabetes mellitus ([address])    • Hypertension    • Liver disease    • Mitral valve prolapse    • Peripheral neuropathy     Neuropathy   • PONV (postoperative nausea and vomiting)    • Thyroid disease        Past Surgical History:   Procedure Laterality Date   • COLONOSCOPY     • INCISION AND DRAINAGE OF WOUND Left 2022    Procedure: INCISION AND DRAINAGE (I&D) EXTREMITY;  Surgeon: Edwige Todd DPM;  Location:  MAIN OR;  Service: Podiatry   • JOINT REPLACEMENT Left 2022    Left TSA   • [place] ARTHRODESIS POSTERIOR/PSTLAT TQ 1NTRSPC LUMBAR Bilateral 2023    Procedure: L1-S1 navigated posterior decompression with instrumented fixation fusion;  Surgeon: Herlinda Joy MD;  Location:  MAIN OR;  Service: Neurosurgery   • THYROID SURGERY      remove cancer       Family History   Problem Relation Age of Onset   • No Known Problems Father    • No Known Problems Mother    • Colon cancer Neg Hx        Social History     Occupational History   • Not on file   Tobacco Use   • Smoking status: Former     Packs/day: 0.25     Years: 5.00     Total pack years: 1.25     Types: Cigarettes     Start date: 1975     Quit date: 1981     Years since quittin.3   • Smokeless tobacco: Never   • Tobacco comments:     quit    Vaping Use   • Vaping Use: Some days   Substance and Sexual Activity   • Alcohol use:  Yes     Alcohol/week: 4.0 - 7.0 standard drinks of alcohol     Types: 1 - 2 Glasses of wine, 2 - 3 Cans of beer, 1 - 2 Shots of liquor per week     Comment: only on special occasions   • Drug use: Not Currently     Frequency: 7.0 times per week     Types: Marijuana     Comment: Medical Marijuana   • Sexual activity: Yes     Partners: Female     Birth control/protection: None         Current Outpatient Medications:   •  ACCU-CHEK FABIANO PLUS test strip, 4 (four) times a day, Disp: , Rfl: 1  •  ACCU-CHEK FASTCLIX LANCETS MISC, 4 (four) times a day, Disp: , Rfl: 1  •  acetaminophen (TYLENOL) 500 mg tablet, Take 1 tablet (500 mg total) by mouth every 6 (six) hours as needed for mild pain 500 mg tablet, Disp: , Rfl: 0  •  CHOLECALCIFEROL PO, Take 5,000 Units by mouth daily, Disp: , Rfl:   •  clonazePAM (KlonoPIN) 1 mg tablet, Take 1 mg by mouth 2 (two) times a day & 3rd pill PRN, Disp: , Rfl:   •  finasteride (PROSCAR) 5 mg tablet, Take 1 tablet (5 mg total) by mouth daily, Disp: 90 tablet, Rfl: 3  •  fluticasone (FLONASE) 50 mcg/act nasal spray, 1 spray into each nostril 2 (two) times a day, Disp: , Rfl:   •  folic acid (FOLVITE) 1 mg tablet, Take 2,000 mcg by mouth daily, Disp: , Rfl: 6  •  gabapentin (NEURONTIN) 400 mg capsule, Take 1 tablet PO 4 times a day, Disp: 120 capsule, Rfl: 3  •  GNP Aspirin Low Dose 81 MG EC tablet, Take 1 tablet (81 mg total) by mouth daily Do not start before April 25, 2023., Disp: , Rfl: 0  •  hydrOXYzine pamoate (VISTARIL) 50 mg capsule, Take 50 mg by mouth daily at bedtime, Disp: , Rfl:   •  ibuprofen (MOTRIN) 800 mg tablet, Take 800 mg by mouth every 6 (six) hours as needed for mild pain, Disp: , Rfl:   •  Jardiance 10 MG TABS, Take 10 mg by mouth every morning, Disp: , Rfl:   •  ketoconazole (NIZORAL) 2 % shampoo, Apply 1 application. topically daily Use as directed, Disp: , Rfl: 6  •  Lantus SoloStar 100 units/mL SOPN, Inject 30 Units under the skin every morning, Disp: , Rfl:   •  losartan-hydrochlorothiazide (HYZAAR) 100-25 MG per tablet, Take 1 tablet by mouth every morning, Disp: , Rfl: 0  •  lovastatin (MEVACOR) 20 mg tablet, Take 20 mg by mouth every morning, Disp: , Rfl: 3  •  metFORMIN (GLUCOPHAGE) 1000 MG tablet, Take 1,000 mg by mouth 2 (two) times a day with meals , Disp: , Rfl:   •  methocarbamol (ROBAXIN) 750 mg tablet, Take 1 tablet (750 mg total) by mouth every 6 (six) hours, Disp: 45 tablet, Rfl: 0  •  methylPREDNISolone 4 MG tablet therapy pack, Use as directed on package.  Do not take NSAIDS when taking this medication, Disp: 1 each, Rfl: 0  •  METOPROLOL SUCCINATE ER PO, Take 75 mg by mouth every morning, Disp: , Rfl:   •  mirtazapine (REMERON) 45 MG tablet, Take 45 mg by mouth daily at bedtime, Disp: , Rfl: 1  •  NIFEdipine (PROCARDIA XL) 30 mg 24 hr tablet, Take 120 mg by mouth every morning Takes 90 mg and 30 mg =120 mg every AM, Disp: , Rfl:   •  NIFEdipine (PROCARDIA XL) 90 mg 24 hr tablet, Take 120 mg by mouth every morning Takes 90 mg and 30 mg =120 mg every AM, Disp: , Rfl: 3  •  sertraline (ZOLOFT) 100 mg tablet, Take 100 mg by mouth every morning, Disp: , Rfl:   •  tamsulosin (FLOMAX) 0.4 mg, Take 1 capsule (0.4 mg total) by mouth daily with dinner, Disp: 90 capsule, Rfl: 3  •  metoprolol succinate (TOPROL-XL) 50 mg 24 hr tablet, TAKE 1 AND 1/2 tabkets BY MOUTH ONCE A DAY (Patient not taking: Reported on 8/15/2023), Disp: , Rfl:   •  naproxen (Naprosyn) 500 mg tablet, Take 1 tablet (500 mg total) by mouth 2 (two) times a day with meals (Patient not taking: Reported on 9/13/2023), Disp: 30 tablet, Rfl: 0  •  polyethylene glycol (Golytely) 4000 mL solution, Take 4,000 mL by mouth once for 1 dose Take 4000 mL by mouth once for 1 dose. Use as directed, Disp: 4000 mL, Rfl: 0    Allergies   Allergen Reactions   • Abilify [Aripiprazole] Tremor     Shaking     • Cephalexin Rash   • Molds & Smuts Allergic Rhinitis   • Pregabalin Tremor     Lyrica - shaking feeling       Physical Exam:    /68 (BP Location: Left arm, Patient Position: Sitting, Cuff Size: Adult)   Temp 99.4 °F (37.4 °C)   Ht 5' 8" (1.727 m) Comment: verbal  Wt 78 kg (172 lb)   BMI 26.15 kg/m²     Constitutional:normal, well developed, well nourished, alert, in no distress and non-toxic and no overt pain behavior.   Eyes:anicteric  HEENT:grossly intact  Neck:supple, symmetric, trachea midline and no masses   Pulmonary:even and unlabored  Cardiovascular:No edema or pitting edema present  Skin:Normal without rashes or lesions and well hydrated  Psychiatric:Mood and affect appropriate  Neurologic:Cranial Nerves II-XII grossly intact  Musculoskeletal:ambulates with cane      Imaging    CT LUMBAR SPINE 4/19/23 (after surgery)     INDICATION:   Postoperative leukocytosis.     COMPARISON: 12/2/2022 4/11/2023     TECHNIQUE:  Contiguous axial images through the lumbar spine were obtained. Sagittal and coronal reconstructions were performed.       IV Contrast: 100 mL of iohexol (OMNIPAQUE)     Radiation dose length product (DLP) for this visit:  1911 mGy-cm . This examination, like all CT scans performed in the [facility], was performed utilizing techniques to minimize radiation dose exposure, including the use of iterative   reconstruction and automated exposure control.       IMAGE QUALITY:  Diagnostic.     FINDINGS:     ALIGNMENT:  There are 5 lumbar type vertebral bodies. Postsurgical change from T12 to S1 posterior fusion and laminectomies     VERTEBRAE:  Diffuse sclerosis throughout the lumbar vertebral bodies. Stable to mildly increased erosive changes in the inferior L1 and superior L2 endplates. No evidence of fracture.     DEGENERATIVE CHANGES:     Lower Thoracic spine:  Stable disc degenerative change without bony central canal stenosis.     L1-2:  Decompression of the central canal.  Facet osteophytosis. Moderate to severe bilateral neural foraminal narrowing.     L2-3:  Decompression of the central canal.  Mild bilateral neural foraminal narrowing.     L3-4:  Normal decompression of the central canal.  Mild right bony neural foraminal narrowing.     L4-5:  Posterior disc osteophytes and disc bulge. Decompression of the central canal.  Facet osteophytosis. At least moderate bilateral neural foraminal narrowing.     L5-S1:  Posterior disc and uncovertebral osteophytes. Facet osteophytosis. No central canal stenosis. Severe right and moderate left neural foraminal narrowing.     PARASPINAL SOFT TISSUES:  Inflammation and gas in the posterior paraspinal soft tissues overlying the surgical bed.   Epidural soft tissues not clearly visualized due to extensive artifact.     IMPRESSION:        1. Postsurgical change from posterior T12-S1 fusion and lumbar laminectomies. Extensive artifact from fusion hardware limits evaluation of the soft tissues. 2.  Decompression of the bony central canal and the lumbar spine. Multilevel moderate to severe neural foraminal narrowing. 3.  Epidural space not clearly visualized. Gas and inflammation in the posterior paraspinal soft tissues along the surgical bed. No evidence of organized fluid collection. No orders to display         No orders of the defined types were placed in this encounter.

## 2023-10-03 ENCOUNTER — APPOINTMENT (OUTPATIENT)
Dept: PHYSICAL THERAPY | Facility: CLINIC | Age: 68
End: 2023-10-03
Payer: COMMERCIAL

## 2023-10-05 ENCOUNTER — OFFICE VISIT (OUTPATIENT)
Dept: PHYSICAL THERAPY | Facility: CLINIC | Age: 68
End: 2023-10-05
Payer: COMMERCIAL

## 2023-10-05 DIAGNOSIS — M54.16 LUMBAR RADICULOPATHY: ICD-10-CM

## 2023-10-05 DIAGNOSIS — Z98.1 STATUS POST LUMBAR SPINAL FUSION: ICD-10-CM

## 2023-10-05 DIAGNOSIS — M47.816 LUMBAR SPONDYLOSIS: Primary | ICD-10-CM

## 2023-10-05 DIAGNOSIS — M48.062 SPINAL STENOSIS OF LUMBAR REGION WITH NEUROGENIC CLAUDICATION: ICD-10-CM

## 2023-10-05 DIAGNOSIS — M54.50 CHRONIC BILATERAL LOW BACK PAIN WITHOUT SCIATICA: ICD-10-CM

## 2023-10-05 DIAGNOSIS — G89.29 CHRONIC BILATERAL LOW BACK PAIN WITHOUT SCIATICA: ICD-10-CM

## 2023-10-05 PROCEDURE — 97110 THERAPEUTIC EXERCISES: CPT

## 2023-10-05 PROCEDURE — 97112 NEUROMUSCULAR REEDUCATION: CPT

## 2023-10-05 NOTE — PROGRESS NOTES
Daily Note     Today's date: 10/5/2023  Patient name: Lisbet Gutierrez  : 1955  MRN: [id number]  Referring provider: Lucio Ford MD  Dx:   Encounter Diagnosis     ICD-10-CM    1. Lumbar spondylosis  M47.816       2. Status post lumbar spinal fusion  Z98.1       3. Lumbar radiculopathy  M54.16       4. Chronic bilateral low back pain without sciatica  M54.50     G89.29       5. Spinal stenosis of lumbar region with neurogenic claudication  M48.062                      Subjective: Patient feeling most pain in LLE in glute and hamstring      Objective: See treatment diary below      Assessment: Able to perform standing exercises today as noted below in treatment diary - seated exercises performed in between standing exercises. Education on HEP, condition, prognosis. Will benefit from skilled PT to address impairments and return to PLOF      Plan: Continue per plan of care.       Precautions: posterior fusion L1-S1 on 2023; hx of cancer; fall risk  Functional Limitations: walking, stairs, dressing, ADLs  Impairments: lumbar and hip ROM, BLE strength, TUG time  [facility] [email or web address]   POC expiration: 2023      Manuals 8/17 8/24 9/19 9/21  9/28 10/5      STM lumbar paraspinals sitting  8' 8'          Bilateral hip PROM  4' 4'          Bilateral hip LAD   2'                       Neuro Re-Ed             hooklying TA nv  nv          hooklying TA / march      10x ea       Standing pball press   10"x10          Std perturbations in romberg    3 min  3 min        STD rows with cues for balance     OTB 2x10 OTB 2x10        TA arms OH      5# 2x10       Iso ADD/ABD with TA      10x3" ea Seated 10"x10      Ther Ex             bike  5' for ROM  nv          LAQ nv  2# 2x10 2x10 ea 2x10 2x10 ea 2x10ea      Seated march HEP            Seated lumbar flexion HEP   3x15" 3x15"  10x      LTR  15ea 10ea          Sit to stand    Post retro mini squat 5x         SKTC             Side stepping    3 laps 10 ft at mirror 3 laps 3 laps 3 laps      Standing 3 way hip nv            Standing knee flexion nv      2x10 ea      walking march     3 laps in // bars  2 laps in // bars      HR    20x 20x 20x 20x + TR seated      Seated hamstring stretch  20"x2 20"x3 ea 3x15" ea 3x15" ea  3x15"      Seated 3 way pball rollout   5"x5ea 5x5" 5x5"  5"x5      Leg press             Mini squat    At mirrow with B/L UE support 2x10  2x10        Ther Activity             Manual push of therapist in // bars     4 laps 10 ft 3 laps        Fwd step up             Lateral step up             Gait Training                                       Modalities

## 2023-10-09 ENCOUNTER — APPOINTMENT (OUTPATIENT)
Dept: RADIOLOGY | Facility: HOSPITAL | Age: 68
End: 2023-10-09
Payer: COMMERCIAL

## 2023-10-09 ENCOUNTER — APPOINTMENT (EMERGENCY)
Dept: CT IMAGING | Facility: HOSPITAL | Age: 68
End: 2023-10-09
Attending: EMERGENCY MEDICINE
Payer: COMMERCIAL

## 2023-10-09 ENCOUNTER — HOSPITAL ENCOUNTER (INPATIENT)
Facility: HOSPITAL | Age: 68
LOS: 1 days | Discharge: HOME | End: 2023-10-11
Attending: EMERGENCY MEDICINE | Admitting: INTERNAL MEDICINE
Payer: COMMERCIAL

## 2023-10-09 ENCOUNTER — APPOINTMENT (EMERGENCY)
Dept: RADIOLOGY | Facility: HOSPITAL | Age: 68
End: 2023-10-09
Payer: COMMERCIAL

## 2023-10-09 DIAGNOSIS — M79.2 NEUROPATHIC PAIN: ICD-10-CM

## 2023-10-09 DIAGNOSIS — F32.A ANXIETY AND DEPRESSION: ICD-10-CM
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
## 2023-10-09 DIAGNOSIS — N30.00 ACUTE CYSTITIS WITHOUT HEMATURIA: ICD-10-CM

## 2023-10-09 DIAGNOSIS — F41.9 ANXIETY AND DEPRESSION: ICD-10-CM

## 2023-10-09 DIAGNOSIS — S01.01XA LACERATION OF SCALP, INITIAL ENCOUNTER: ICD-10-CM

## 2023-10-09 DIAGNOSIS — R93.7 ABNORMAL CT SCAN, LUMBAR SPINE: ICD-10-CM

## 2023-10-09 DIAGNOSIS — Z98.1 STATUS POST LUMBAR SPINAL FUSION: ICD-10-CM

## 2023-10-09 DIAGNOSIS — R26.2 AMBULATORY DYSFUNCTION: ICD-10-CM

## 2023-10-09 DIAGNOSIS — Z98.1 S/P LUMBAR FUSION: ICD-10-CM

## 2023-10-09 PROBLEM — R09.02 HYPOXIA: Status: ACTIVE | Noted: 2023-10-09

## 2023-10-09 PROBLEM — M54.41 CHRONIC BILATERAL LOW BACK PAIN WITH SCIATICA: Status: ACTIVE | Noted: 2023-10-09

## 2023-10-09 PROBLEM — I10 HYPERTENSION: Status: ACTIVE | Noted: 2023-10-09

## 2023-10-09 PROBLEM — M54.40 CHRONIC BILATERAL LOW BACK PAIN WITH SCIATICA: Status: ACTIVE | Noted: 2023-10-09

## 2023-10-09 PROBLEM — G89.29 CHRONIC BILATERAL LOW BACK PAIN WITH SCIATICA: Status: ACTIVE | Noted: 2023-10-09

## 2023-10-09 PROBLEM — M54.42 CHRONIC BILATERAL LOW BACK PAIN WITH SCIATICA: Status: ACTIVE | Noted: 2023-10-09

## 2023-10-09 LAB
ALBUMIN SERPL BCP-MCNC: 4.4 G/DL (ref 3.5–5)
ALP SERPL-CCNC: 75 U/L (ref 34–104)
ALT SERPL W P-5'-P-CCNC: 12 U/L (ref 7–52)
AMPHETAMINES SERPL QL SCN: NEGATIVE
ANION GAP SERPL CALCULATED.3IONS-SCNC: 7 MMOL/L
APTT PPP: 27 SECONDS (ref 23–37)
AST SERPL W P-5'-P-CCNC: 21 U/L (ref 13–39)
BACTERIA UR QL AUTO: ABNORMAL /HPF
BARBITURATES UR QL: NEGATIVE
BASOPHILS # BLD AUTO: 0.14 THOUSANDS/ÂΜL (ref 0–0.1)
BASOPHILS NFR BLD AUTO: 1 % (ref 0–1)
BENZODIAZ UR QL: NEGATIVE
BILIRUB SERPL-MCNC: 0.32 MG/DL (ref 0.2–1)
BILIRUB UR QL STRIP: NEGATIVE
BUN SERPL-MCNC: 20 MG/DL (ref 5–25)
CALCIUM SERPL-MCNC: 9.2 MG/DL (ref 8.4–10.2)
CHLORIDE SERPL-SCNC: 100 MMOL/L (ref 96–108)
CLARITY UR: ABNORMAL
CO2 SERPL-SCNC: 30 MMOL/L (ref 21–32)
COCAINE UR QL: NEGATIVE
COLOR UR: YELLOW
CREAT SERPL-MCNC: 0.95 MG/DL (ref 0.6–1.3)
EOSINOPHIL # BLD AUTO: 0.36 THOUSAND/ÂΜL (ref 0–0.61)
EOSINOPHIL NFR BLD AUTO: 3 % (ref 0–6)
ERYTHROCYTE [DISTWIDTH] IN BLOOD BY AUTOMATED COUNT: 19.9 % (ref 11.6–15.1)
GFR SERPL CREATININE-BSD FRML MDRD: 83 ML/MIN/1.73SQ M
GLUCOSE SERPL-MCNC: 136 MG/DL (ref 65–140)
GLUCOSE SERPL-MCNC: 174 MG/DL (ref 65–140)
GLUCOSE UR STRIP-MCNC: ABNORMAL MG/DL
HCT VFR BLD AUTO: 31.4 % (ref 36.5–49.3)
HGB BLD-MCNC: 8.6 G/DL (ref 12–17)
HGB UR QL STRIP.AUTO: NEGATIVE
IMM GRANULOCYTES # BLD AUTO: 0.06 THOUSAND/UL (ref 0–0.2)
IMM GRANULOCYTES NFR BLD AUTO: 1 % (ref 0–2)
INR PPP: 0.96 (ref 0.84–1.19)
KETONES UR STRIP-MCNC: NEGATIVE MG/DL
LACTATE SERPL-SCNC: 2 MMOL/L (ref 0.5–2)
LEUKOCYTE ESTERASE UR QL STRIP: ABNORMAL
LYMPHOCYTES # BLD AUTO: 2.87 THOUSANDS/ÂΜL (ref 0.6–4.47)
LYMPHOCYTES NFR BLD AUTO: 23 % (ref 14–44)
MCH RBC QN AUTO: 20.1 PG (ref 26.8–34.3)
MCHC RBC AUTO-ENTMCNC: 27.4 G/DL (ref 31.4–37.4)
MCV RBC AUTO: 73 FL (ref 82–98)
MONOCYTES # BLD AUTO: 0.8 THOUSAND/ÂΜL (ref 0.17–1.22)
MONOCYTES NFR BLD AUTO: 7 % (ref 4–12)
NEUTROPHILS # BLD AUTO: 8.1 THOUSANDS/ÂΜL (ref 1.85–7.62)
NEUTS SEG NFR BLD AUTO: 65 % (ref 43–75)
NITRITE UR QL STRIP: POSITIVE
NON-SQ EPI CELLS URNS QL MICRO: ABNORMAL /HPF
NRBC BLD AUTO-RTO: 0 /100 WBCS
OPIATES UR QL SCN: NEGATIVE
OTHER STN SPEC: ABNORMAL
OXYCODONE+OXYMORPHONE UR QL SCN: NEGATIVE
PCP UR QL: NEGATIVE
PH UR STRIP.AUTO: 6 [PH]
PLATELET # BLD AUTO: 501 THOUSANDS/UL (ref 149–390)
PMV BLD AUTO: 9.6 FL (ref 8.9–12.7)
POTASSIUM SERPL-SCNC: 4.3 MMOL/L (ref 3.5–5.3)
PROCALCITONIN SERPL-MCNC: 0.09 NG/ML
PROT SERPL-MCNC: 7.5 G/DL (ref 6.4–8.4)
PROT UR STRIP-MCNC: NEGATIVE MG/DL
PROTHROMBIN TIME: 13.2 SECONDS (ref 11.6–14.5)
RBC # BLD AUTO: 4.28 MILLION/UL (ref 3.88–5.62)
RBC #/AREA URNS AUTO: ABNORMAL /HPF
SODIUM SERPL-SCNC: 137 MMOL/L (ref 135–147)
SP GR UR STRIP.AUTO: 1.01 (ref 1–1.03)
THC UR QL: POSITIVE
UROBILINOGEN UR STRIP-ACNC: <2 MG/DL
WBC # BLD AUTO: 12.33 THOUSAND/UL (ref 4.31–10.16)
WBC #/AREA URNS AUTO: ABNORMAL /HPF

## 2023-10-09 PROCEDURE — 70450 CT HEAD/BRAIN W/O DYE: CPT

## 2023-10-09 PROCEDURE — 12001 RPR S/N/AX/GEN/TRNK 2.5CM/<: CPT | Performed by: EMERGENCY MEDICINE

## 2023-10-09 PROCEDURE — 85025 COMPLETE CBC W/AUTO DIFF WBC: CPT | Performed by: EMERGENCY MEDICINE

## 2023-10-09 PROCEDURE — 80307 DRUG TEST PRSMV CHEM ANLYZR: CPT | Performed by: INTERNAL MEDICINE

## 2023-10-09 PROCEDURE — 85730 THROMBOPLASTIN TIME PARTIAL: CPT | Performed by: EMERGENCY MEDICINE

## 2023-10-09 PROCEDURE — 99223 1ST HOSP IP/OBS HIGH 75: CPT | Performed by: INTERNAL MEDICINE

## 2023-10-09 PROCEDURE — 83605 ASSAY OF LACTIC ACID: CPT | Performed by: EMERGENCY MEDICINE

## 2023-10-09 PROCEDURE — 87040 BLOOD CULTURE FOR BACTERIA: CPT | Performed by: EMERGENCY MEDICINE

## 2023-10-09 PROCEDURE — 82948 REAGENT STRIP/BLOOD GLUCOSE: CPT

## 2023-10-09 PROCEDURE — 71045 X-RAY EXAM CHEST 1 VIEW: CPT

## 2023-10-09 PROCEDURE — 72125 CT NECK SPINE W/O DYE: CPT

## 2023-10-09 PROCEDURE — G0008 ADMIN INFLUENZA VIRUS VAC: HCPCS | Performed by: INTERNAL MEDICINE

## 2023-10-09 PROCEDURE — 85610 PROTHROMBIN TIME: CPT | Performed by: EMERGENCY MEDICINE

## 2023-10-09 PROCEDURE — 99285 EMERGENCY DEPT VISIT HI MDM: CPT

## 2023-10-09 PROCEDURE — 84145 PROCALCITONIN (PCT): CPT | Performed by: EMERGENCY MEDICINE

## 2023-10-09 PROCEDURE — 96374 THER/PROPH/DIAG INJ IV PUSH: CPT

## 2023-10-09 PROCEDURE — 72131 CT LUMBAR SPINE W/O DYE: CPT

## 2023-10-09 PROCEDURE — 80053 COMPREHEN METABOLIC PANEL: CPT | Performed by: EMERGENCY MEDICINE

## 2023-10-09 PROCEDURE — 72110 X-RAY EXAM L-2 SPINE 4/>VWS: CPT

## 2023-10-09 PROCEDURE — 81001 URINALYSIS AUTO W/SCOPE: CPT | Performed by: INTERNAL MEDICINE

## 2023-10-09 PROCEDURE — 36415 COLL VENOUS BLD VENIPUNCTURE: CPT | Performed by: EMERGENCY MEDICINE

## 2023-10-09 PROCEDURE — 99285 EMERGENCY DEPT VISIT HI MDM: CPT | Performed by: EMERGENCY MEDICINE

## 2023-10-09 PROCEDURE — 99446 NTRPROF PH1/NTRNET/EHR 5-10: CPT | Performed by: PHYSICIAN ASSISTANT

## 2023-10-09 PROCEDURE — 90662 IIV NO PRSV INCREASED AG IM: CPT | Performed by: INTERNAL MEDICINE

## 2023-10-09 RX ORDER — INSULIN GLARGINE 100 [IU]/ML
10 INJECTION, SOLUTION SUBCUTANEOUS
Status: DISCONTINUED | OUTPATIENT
Start: 2023-10-09 | End: 2023-10-10

## 2023-10-09 RX ORDER — TAMSULOSIN HYDROCHLORIDE 0.4 MG/1
0.4 CAPSULE ORAL
Status: DISCONTINUED | OUTPATIENT
Start: 2023-10-09 | End: 2023-10-11 | Stop reason: HOSPADM

## 2023-10-09 RX ORDER — HYDROXYZINE HYDROCHLORIDE 25 MG/1
50 TABLET, FILM COATED ORAL
Status: DISCONTINUED | OUTPATIENT
Start: 2023-10-09 | End: 2023-10-09

## 2023-10-09 RX ORDER — NIFEDIPINE 30 MG/1
120 TABLET, EXTENDED RELEASE ORAL EVERY MORNING
Status: DISCONTINUED | OUTPATIENT
Start: 2023-10-10 | End: 2023-10-11 | Stop reason: HOSPADM

## 2023-10-09 RX ORDER — LIDOCAINE 50 MG/G
1 PATCH TOPICAL ONCE
Status: COMPLETED | OUTPATIENT
Start: 2023-10-09 | End: 2023-10-09

## 2023-10-09 RX ORDER — KETOROLAC TROMETHAMINE 30 MG/ML
15 INJECTION, SOLUTION INTRAMUSCULAR; INTRAVENOUS ONCE
Status: COMPLETED | OUTPATIENT
Start: 2023-10-09 | End: 2023-10-09

## 2023-10-09 RX ORDER — CLONAZEPAM 1 MG/1
1 TABLET ORAL DAILY PRN
Status: DISCONTINUED | OUTPATIENT
Start: 2023-10-09 | End: 2023-10-09

## 2023-10-09 RX ORDER — OXYCODONE HYDROCHLORIDE 5 MG/1
5 TABLET ORAL EVERY 4 HOURS PRN
Status: DISCONTINUED | OUTPATIENT
Start: 2023-10-09 | End: 2023-10-09

## 2023-10-09 RX ORDER — METHOCARBAMOL 500 MG/1
750 TABLET, FILM COATED ORAL EVERY 6 HOURS PRN
Status: DISCONTINUED | OUTPATIENT
Start: 2023-10-09 | End: 2023-10-11 | Stop reason: HOSPADM

## 2023-10-09 RX ORDER — INSULIN LISPRO 100 [IU]/ML
1-5 INJECTION, SOLUTION INTRAVENOUS; SUBCUTANEOUS
Status: DISCONTINUED | OUTPATIENT
Start: 2023-10-09 | End: 2023-10-11 | Stop reason: HOSPADM

## 2023-10-09 RX ORDER — ACETAMINOPHEN 325 MG/1
975 TABLET ORAL EVERY 8 HOURS
Status: DISCONTINUED | OUTPATIENT
Start: 2023-10-09 | End: 2023-10-11 | Stop reason: HOSPADM

## 2023-10-09 RX ORDER — CLONAZEPAM 1 MG/1
1 TABLET ORAL DAILY
Status: DISCONTINUED | OUTPATIENT
Start: 2023-10-10 | End: 2023-10-11

## 2023-10-09 RX ORDER — LIDOCAINE 50 MG/G
1 PATCH TOPICAL DAILY
Status: DISCONTINUED | OUTPATIENT
Start: 2023-10-10 | End: 2023-10-09

## 2023-10-09 RX ORDER — METHOCARBAMOL 500 MG/1
750 TABLET, FILM COATED ORAL EVERY 6 HOURS SCHEDULED
Status: DISCONTINUED | OUTPATIENT
Start: 2023-10-09 | End: 2023-10-09

## 2023-10-09 RX ORDER — MIRTAZAPINE 15 MG/1
45 TABLET, FILM COATED ORAL
Status: DISCONTINUED | OUTPATIENT
Start: 2023-10-09 | End: 2023-10-11 | Stop reason: HOSPADM

## 2023-10-09 RX ORDER — FOLIC ACID 1 MG/1
2000 TABLET ORAL DAILY
Status: DISCONTINUED | OUTPATIENT
Start: 2023-10-10 | End: 2023-10-11 | Stop reason: HOSPADM

## 2023-10-09 RX ORDER — HYDROCHLOROTHIAZIDE 25 MG/1
25 TABLET ORAL EVERY MORNING
Status: DISCONTINUED | OUTPATIENT
Start: 2023-10-10 | End: 2023-10-11 | Stop reason: HOSPADM

## 2023-10-09 RX ORDER — CLONAZEPAM 1 MG/1
1 TABLET ORAL 2 TIMES DAILY
Status: DISCONTINUED | OUTPATIENT
Start: 2023-10-09 | End: 2023-10-09

## 2023-10-09 RX ORDER — PRAVASTATIN SODIUM 20 MG
20 TABLET ORAL
Status: DISCONTINUED | OUTPATIENT
Start: 2023-10-09 | End: 2023-10-11 | Stop reason: HOSPADM

## 2023-10-09 RX ORDER — AMOXICILLIN 250 MG
1 CAPSULE ORAL
Status: DISCONTINUED | OUTPATIENT
Start: 2023-10-09 | End: 2023-10-11 | Stop reason: HOSPADM

## 2023-10-09 RX ORDER — LOSARTAN POTASSIUM 50 MG/1
100 TABLET ORAL EVERY MORNING
Status: DISCONTINUED | OUTPATIENT
Start: 2023-10-10 | End: 2023-10-11 | Stop reason: HOSPADM

## 2023-10-09 RX ORDER — GABAPENTIN 400 MG/1
400 CAPSULE ORAL 3 TIMES DAILY
Status: DISCONTINUED | OUTPATIENT
Start: 2023-10-09 | End: 2023-10-09

## 2023-10-09 RX ORDER — FINASTERIDE 5 MG/1
5 TABLET, FILM COATED ORAL DAILY
Status: DISCONTINUED | OUTPATIENT
Start: 2023-10-09 | End: 2023-10-11 | Stop reason: HOSPADM

## 2023-10-09 RX ORDER — ACETAMINOPHEN 325 MG/1
975 TABLET ORAL ONCE
Status: COMPLETED | OUTPATIENT
Start: 2023-10-09 | End: 2023-10-09

## 2023-10-09 RX ORDER — SERTRALINE HYDROCHLORIDE 100 MG/1
100 TABLET, FILM COATED ORAL EVERY MORNING
Status: DISCONTINUED | OUTPATIENT
Start: 2023-10-10 | End: 2023-10-11 | Stop reason: HOSPADM

## 2023-10-09 RX ORDER — INSULIN LISPRO 100 [IU]/ML
1-6 INJECTION, SOLUTION INTRAVENOUS; SUBCUTANEOUS
Status: DISCONTINUED | OUTPATIENT
Start: 2023-10-10 | End: 2023-10-11 | Stop reason: HOSPADM

## 2023-10-09 RX ORDER — LIDOCAINE HYDROCHLORIDE AND EPINEPHRINE 10; 10 MG/ML; UG/ML
5 INJECTION, SOLUTION INFILTRATION; PERINEURAL ONCE
Status: COMPLETED | OUTPATIENT
Start: 2023-10-09 | End: 2023-10-09

## 2023-10-09 RX ORDER — HYDROMORPHONE HCL IN WATER/PF 6 MG/30 ML
0.2 PATIENT CONTROLLED ANALGESIA SYRINGE INTRAVENOUS EVERY 4 HOURS PRN
Status: DISCONTINUED | OUTPATIENT
Start: 2023-10-09 | End: 2023-10-11

## 2023-10-09 RX ORDER — FLUTICASONE PROPIONATE 50 MCG
1 SPRAY, SUSPENSION (ML) NASAL 2 TIMES DAILY PRN
Status: DISCONTINUED | OUTPATIENT
Start: 2023-10-09 | End: 2023-10-11 | Stop reason: HOSPADM

## 2023-10-09 RX ORDER — POLYETHYLENE GLYCOL 3350 17 G/17G
17 POWDER, FOR SOLUTION ORAL DAILY PRN
Status: DISCONTINUED | OUTPATIENT
Start: 2023-10-09 | End: 2023-10-11 | Stop reason: HOSPADM

## 2023-10-09 RX ORDER — ENOXAPARIN SODIUM 100 MG/ML
40 INJECTION SUBCUTANEOUS DAILY
Status: DISCONTINUED | OUTPATIENT
Start: 2023-10-09 | End: 2023-10-11 | Stop reason: HOSPADM

## 2023-10-09 RX ADMIN — ACETAMINOPHEN 975 MG: 325 TABLET, FILM COATED ORAL at 18:57

## 2023-10-09 RX ADMIN — KETOROLAC TROMETHAMINE 15 MG: 30 INJECTION, SOLUTION INTRAMUSCULAR; INTRAVENOUS at 11:32

## 2023-10-09 RX ADMIN — PRAVASTATIN SODIUM 20 MG: 20 TABLET ORAL at 16:20

## 2023-10-09 RX ADMIN — ACETAMINOPHEN 975 MG: 325 TABLET, FILM COATED ORAL at 11:29

## 2023-10-09 RX ADMIN — Medication 2.5 MG: at 22:01

## 2023-10-09 RX ADMIN — INFLUENZA A VIRUS A/VICTORIA/4897/2022 IVR-238 (H1N1) ANTIGEN (FORMALDEHYDE INACTIVATED), INFLUENZA A VIRUS A/DARWIN/9/2021 SAN-010 (H3N2) ANTIGEN (FORMALDEHYDE INACTIVATED), INFLUENZA B VIRUS B/PHUKET/3073/2013 ANTIGEN (FORMALDEHYDE INACTIVATED), AND INFLUENZA B VIRUS B/MICHIGAN/01/2021 ANTIGEN (FORMALDEHYDE INACTIVATED) 0.7 ML: 60; 60; 60; 60 INJECTION, SUSPENSION INTRAMUSCULAR at 18:57

## 2023-10-09 RX ADMIN — ENOXAPARIN SODIUM 40 MG: 100 INJECTION SUBCUTANEOUS at 16:21

## 2023-10-09 RX ADMIN — TAMSULOSIN HYDROCHLORIDE 0.4 MG: 0.4 CAPSULE ORAL at 16:20

## 2023-10-09 RX ADMIN — FINASTERIDE 5 MG: 5 TABLET, FILM COATED ORAL at 16:20

## 2023-10-09 RX ADMIN — SENNOSIDES AND DOCUSATE SODIUM 1 TABLET: 50; 8.6 TABLET ORAL at 22:01

## 2023-10-09 RX ADMIN — LIDOCAINE HYDROCHLORIDE,EPINEPHRINE BITARTRATE 5 ML: 10; .01 INJECTION, SOLUTION INFILTRATION; PERINEURAL at 12:07

## 2023-10-09 RX ADMIN — LIDOCAINE 1 PATCH: 700 PATCH TOPICAL at 10:40

## 2023-10-09 RX ADMIN — MIRTAZAPINE 45 MG: 15 TABLET, FILM COATED ORAL at 22:01

## 2023-10-09 NOTE — ASSESSMENT & PLAN NOTE
Per patient's wife, patient fell 3 times today  Wife reported patient has ambulatory dysfunction since the back surgery a few months ago. She reported because of the pain his legs are giving up also he rested in bed most of the time since the back surgery. Suspect this is multifactorial: Back pain, deconditioning, and medication side effect  Patient's wife reported patient is drowsy after gabapentin. We will discontinue gabapentin, discontinue hydroxyzine. fall precaution.   PT/OT  Pain management

## 2023-10-09 NOTE — ASSESSMENT & PLAN NOTE
History of urine retention and BPH. Patient he started to retain urine after his back surgery in April  Was seen in urology office as outpatient, had cystoscopy that showed prostate enlarged (140 g). Urologist discussed with him about surgery however patient declined at that time   Patient currently self caths 3 times a day  Had urinary infection in August treated for E.  Coli  Urinary retention protocol  Continue self cath

## 2023-10-09 NOTE — PLAN OF CARE
Problem: MOBILITY - ADULT  Goal: Maintain or return to baseline ADL function  Description: INTERVENTIONS:  -  Assess patient's ability to carry out ADLs; assess patient's baseline for ADL function and identify physical deficits which impact ability to perform ADLs (bathing, care of mouth/teeth, toileting, grooming, dressing, etc.)  - Assess/evaluate cause of self-care deficits   - Assess range of motion  - Assess patient's mobility; develop plan if impaired  - Assess patient's need for assistive devices and provide as appropriate  - Encourage maximum independence but intervene and supervise when necessary  - Involve family in performance of ADLs  - Assess for home care needs following discharge   - Consider OT consult to assist with ADL evaluation and planning for discharge  - Provide patient education as appropriate  Outcome: Progressing  Goal: Maintains/Returns to pre admission functional level  Description: INTERVENTIONS:  - Perform BMAT or MOVE assessment daily.   - Set and communicate daily mobility goal to care team and patient/family/caregiver. - Collaborate with rehabilitation services on mobility goals if consulted  - Perform Range of Motion 3 times a day. - Reposition patient every 2 hours.   - Dangle patient 3 times a day  - Stand patient 3 times a day  - Ambulate patient 3 times a day  - Out of bed to chair 3 times a day   - Out of bed for meals 3 times a day  - Out of bed for toileting  - Record patient progress and toleration of activity level   Outcome: Progressing     Problem: Prexisting or High Potential for Compromised Skin Integrity  Goal: Skin integrity is maintained or improved  Description: INTERVENTIONS:  - Identify patients at risk for skin breakdown  - Assess and monitor skin integrity  - Assess and monitor nutrition and hydration status  - Monitor labs   - Assess for incontinence   - Turn and reposition patient  - Assist with mobility/ambulation  - Relieve pressure over bony prominences  - Avoid friction and shearing  - Provide appropriate hygiene as needed including keeping skin clean and dry  - Evaluate need for skin moisturizer/barrier cream  - Collaborate with interdisciplinary team   - Patient/family teaching  - Consider wound care consult   Outcome: Progressing     Problem: PAIN - ADULT  Goal: Verbalizes/displays adequate comfort level or baseline comfort level  Description: Interventions:  - Encourage patient to monitor pain and request assistance  - Assess pain using appropriate pain scale  - Administer analgesics based on type and severity of pain and evaluate response  - Implement non-pharmacological measures as appropriate and evaluate response  - Consider cultural and social influences on pain and pain management  - Notify physician/advanced practitioner if interventions unsuccessful or patient reports new pain  Outcome: Progressing     Problem: INFECTION - ADULT  Goal: Absence or prevention of progression during hospitalization  Description: INTERVENTIONS:  - Assess and monitor for signs and symptoms of infection  - Monitor lab/diagnostic results  - Monitor all insertion sites, i.e. indwelling lines, tubes, and drains  - Monitor endotracheal if appropriate and nasal secretions for changes in amount and color  - [place] appropriate cooling/warming therapies per order  - Administer medications as ordered  - Instruct and encourage patient and family to use good hand hygiene technique  - Identify and instruct in appropriate isolation precautions for identified infection/condition  Outcome: Progressing  Goal: Absence of fever/infection during neutropenic period  Description: INTERVENTIONS:  - Monitor WBC    Outcome: Progressing     Problem: SAFETY ADULT  Goal: Maintain or return to baseline ADL function  Description: INTERVENTIONS:  -  Assess patient's ability to carry out ADLs; assess patient's baseline for ADL function and identify physical deficits which impact ability to perform ADLs (bathing, care of mouth/teeth, toileting, grooming, dressing, etc.)  - Assess/evaluate cause of self-care deficits   - Assess range of motion  - Assess patient's mobility; develop plan if impaired  - Assess patient's need for assistive devices and provide as appropriate  - Encourage maximum independence but intervene and supervise when necessary  - Involve family in performance of ADLs  - Assess for home care needs following discharge   - Consider OT consult to assist with ADL evaluation and planning for discharge  - Provide patient education as appropriate  Outcome: Progressing  Goal: Maintains/Returns to pre admission functional level  Description: INTERVENTIONS:  - Perform BMAT or MOVE assessment daily.   - Set and communicate daily mobility goal to care team and patient/family/caregiver. - Collaborate with rehabilitation services on mobility goals if consulted  - Perform Range of Motion 3 times a day. - Reposition patient every 2 hours.   - Dangle patient 3 times a day  - Stand patient 3 times a day  - Ambulate patient 3 times a day  - Out of bed to chair 3 times a day   - Out of bed for meals 3 times a day  - Out of bed for toileting  - Record patient progress and toleration of activity level   Outcome: Progressing  Goal: Patient will remain free of falls  Description: INTERVENTIONS:  - Educate patient/family on patient safety including physical limitations  - Instruct patient to call for assistance with activity   - Consult OT/PT to assist with strengthening/mobility   - Keep Call bell within reach  - Keep bed low and locked with side rails adjusted as appropriate  - Keep care items and personal belongings within reach  - Initiate and maintain comfort rounds  - Make Fall Risk Sign visible to staff  - Offer Toileting every 2 Hours, in advance of need  - Initiate/Maintain bed/ chair alarm  - Obtain necessary fall risk management equipment: walker  - Apply yellow socks and bracelet for high fall risk patients  - Consider moving patient to room near nurses station  Outcome: Progressing     Problem: DISCHARGE PLANNING  Goal: Discharge to home or other facility with appropriate resources  Description: INTERVENTIONS:  - Identify barriers to discharge w/patient and caregiver  - Arrange for needed discharge resources and transportation as appropriate  - Identify discharge learning needs (meds, wound care, etc.)  - Arrange for interpretive services to assist at discharge as needed  - Refer to Case Management Department for coordinating discharge planning if the patient needs post-hospital services based on physician/advanced practitioner order or complex needs related to functional status, cognitive ability, or social support system  Outcome: Progressing     Problem: Knowledge Deficit  Goal: Patient/family/caregiver demonstrates understanding of disease process, treatment plan, medications, and discharge instructions  Description: Complete learning assessment and assess knowledge base.   Interventions:  - Provide teaching at level of understanding  - Provide teaching via preferred learning methods  Outcome: Progressing

## 2023-10-09 NOTE — ASSESSMENT & PLAN NOTE
Home medications Zoloft 100 mg, Remeron 45 mg. Wife mentioned patient was tried on a lower dose of Remeron however he did not do well with it and Remeron was increased back to 45 mg daily. Also taking hydroxyzine at bedtime  On clonazepam 1 mg twice a day and 3rd pill as needed  Patient was noted to have pinpoint pupils today. Reviewed patient's medication list.  Not on opiates but on benzodiazepine. We will decrease Klonopin to 1 mg a day, also because patient need opiates to control his pain. Check urine drug.  CTH w/o acute changes

## 2023-10-09 NOTE — ASSESSMENT & PLAN NOTE
Patient was noted to have oxygen saturation in the 70s while he was sleeping. Also patient was snoring. Concerning for MANUEL.   Oxygen on nasal cannula  Will need sleep study as outpatient

## 2023-10-09 NOTE — H&P
[facility]  H&P  Name: Cb Guerra 79 y.o. male I MRN: [id number]  Unit/Bed#: -01 I Date of Admission: 10/9/2023   Date of Service: 10/9/2023 I Hospital Day: 0      Assessment/Plan   * Fall  Assessment & Plan  Per patient's wife, patient fell 3 times today  Wife reported patient has ambulatory dysfunction since the back surgery a few months ago. She reported because of the pain his legs are giving up also he rested in bed most of the time since the back surgery. Suspect this is multifactorial: Back pain, deconditioning, and medication side effect  Patient's wife reported patient is drowsy after gabapentin. We will discontinue gabapentin, discontinue hydroxyzine. fall precaution. PT/OT  Pain management    Hypoxia  Assessment & Plan  Patient was noted to have oxygen saturation in the 70s while he was sleeping. Also patient was snoring. Concerning for MANUEL. Oxygen on nasal cannula  Will need sleep study as outpatient    Hypertension  Assessment & Plan  Continue HCTZ losartan    Anxiety and depression  Assessment & Plan  Home medications Zoloft 100 mg, Remeron 45 mg. Wife mentioned patient was tried on a lower dose of Remeron however he did not do well with it and Remeron was increased back to 45 mg daily. Also taking hydroxyzine at bedtime  On clonazepam 1 mg twice a day and 3rd pill as needed  Patient was noted to have pinpoint pupils today. Reviewed patient's medication list.  Not on opiates but on benzodiazepine. We will decrease Klonopin to 1 mg a day, also because patient need opiates to control his pain. Check urine drug. CTH w/o acute changes       Chronic bilateral low back pain with sciatica  Assessment & Plan  Patient's status post T12-S1 fusion with bilateral laminectomy and facetectomy in April 2023. Patient reported continuous of back pain since, radiating bilateral legs.   Per wife and patient, the pain was disabling, patient mostly resting in bed also with decreased oral intake. Was seen as outpatient by acute pain management and was started on gabapentin that was slowly titrated to 400 mg 4 times a day. Per wife, patient somnolent after gabapentin. Was on steroids before, not currently  Also on medical marijuana-last dose last night. Not on opiates  Because of fall, patient had CT lumbar that showed development of lucency S1 around bilateral screws discerning for loosening. No bone destruction but correlation with infection, clinically   neurosurgery was consulted, recommended upright x-ray lumbar spine  Patient has milld leukocytosis that might be reactive, has normal procalcitonin. No concern of infection at this point. Pain management    Urinary retention  Assessment & Plan  History of urine retention and BPH. Patient he started to retain urine after his back surgery in April  Was seen in urology office as outpatient, had cystoscopy that showed prostate enlarged (140 g). Urologist discussed with him about surgery however patient declined at that time   Patient currently self caths 3 times a day  Had urinary infection in August treated for E. Coli  Urinary retention protocol  Continue self cath     Hypercholesterolemia  Assessment & Plan  Continue statin    DM (diabetes mellitus) ([address] [facility])  Assessment & Plan  Lab Results   Component Value Date    HGBA1C 6.3 (H) 03/14/2023       No results for input(s): "POCGLU" in the last 72 hours. Blood Sugar Average: Last 72 hrs: On Jardiance as outpatient. Also because of poor oral intake patient was not taking his 30 units of Lantus in the morning. Hold oral medication. Start Lantus 10 units at bedtime, add sliding scale insulin. Accu-Chek  Diabetic diet      Diabetic peripheral neuropathy [facility])  Assessment & Plan  Lab Results   Component Value Date    HGBA1C 6.3 (H) 03/14/2023       No results for input(s): "POCGLU" in the last 72 hours. Blood Sugar Average: Last 72 hrs:  Chronic, currently on gabapentin. Had side effects on Lyrica  We will hold gabapentin for now           VTE Pharmacologic Prophylaxis: VTE Score: 4 Moderate Risk (Score 3-4) - Pharmacological DVT Prophylaxis Ordered: enoxaparin (Lovenox). Code Status: Level 3 - DNAR and DNI   Discussion with family: Updated  (wife) at bedside. Anticipated Length of Stay: Patient will be admitted on an inpatient basis with an anticipated length of stay of greater than 2 midnights secondary to fall, back pain . Total Time Spent on Date of Encounter in care of patient: 50 mins. This time was spent on one or more of the following: performing physical exam; counseling and coordination of care; obtaining or reviewing history; documenting in the medical record; reviewing/ordering tests, medications or procedures; communicating with other healthcare professionals and discussing with patient's family/caregivers. Chief Complaint: fall, back pain     History of Present Illness:  Shiela Zaidi is a 79 y.o. male with a PMH of hypertension, diabetes, anxiety and depression, lumbar fusion in April who presents with fall. Manjula Lima Per wife and patient, since the back surgery patient was having persistent back pain that was debilitating; had poor ambulation also decreased appetite. He was seen by APS as outpatient and started on gabapentin that was titrated up to 400 mg 4 times a day. Per wife patient fell 3 times today so he was brought to emergency department. CT lumbar showed developing lucency S1 around bilateral screw, concerning for loosening versus infection. Patient was admitted for further management    Review of Systems:  Review of Systems   Constitutional:  Positive for appetite change and unexpected weight change. Negative for chills and fever. Respiratory:  Negative for cough and shortness of breath. Cardiovascular:  Negative for chest pain. Gastrointestinal:  Negative for abdominal pain, constipation, diarrhea, nausea and vomiting. Genitourinary:  Positive for difficulty urinating. Neurological:  Negative for weakness and numbness. All other systems reviewed and are negative. Past Medical and Surgical History:   Past Medical History:   Diagnosis Date    Anxiety     Arthritis     Cancer ([address])     Depression     Diabetes mellitus ([address])     Hypertension     Liver disease     Mitral valve prolapse     Peripheral neuropathy     Neuropathy    PONV (postoperative nausea and vomiting)     Thyroid disease        Past Surgical History:   Procedure Laterality Date    COLONOSCOPY      INCISION AND DRAINAGE OF WOUND Left 08/12/2022    Procedure: INCISION AND DRAINAGE (I&D) EXTREMITY;  Surgeon: Darryle Pay, DPM;  Location:  MAIN OR;  Service: Podiatry    JOINT REPLACEMENT Left 03/11/2022    Left TSA    HI ARTHRODESIS POSTERIOR/PSTLAT TQ 1NTRSPC LUMBAR Bilateral 04/11/2023    Procedure: L1-S1 navigated posterior decompression with instrumented fixation fusion;  Surgeon: Heavenly Ordaz MD;  Location:  MAIN OR;  Service: Neurosurgery    THYROID SURGERY  2021    remove cancer       Meds/Allergies:  Prior to Admission medications    Medication Sig Start Date End Date Taking?  Authorizing Provider   acetaminophen (TYLENOL) 500 mg tablet Take 1 tablet (500 mg total) by mouth every 6 (six) hours as needed for mild pain 500 mg tablet 4/22/23  Yes Katja Oshea PA-C   CHOLECALCIFEROL PO Take 5,000 Units by mouth daily   Yes Historical Provider, MD   clonazePAM (KlonoPIN) 1 mg tablet Take 1 mg by mouth 2 (two) times a day & 3rd pill PRN 10/24/22  Yes Historical Provider, MD   finasteride (PROSCAR) 5 mg tablet Take 1 tablet (5 mg total) by mouth daily 7/18/23  Yes Venkata Tinoco MD   folic acid (FOLVITE) 1 mg tablet Take 2,000 mcg by mouth daily 12/17/18  Yes Historical Provider, MD   gabapentin (NEURONTIN) 400 mg capsule Take 1 tablet PO 4 times a day 10/2/23  Yes RUIZ Mora   [facility] Aspirin Low Dose 81 MG EC tablet Take 1 tablet (81 mg total) by mouth daily Do not start before April 25, 2023. 4/25/23  Yes Katja Oshea PA-C   hydrOXYzine pamoate (VISTARIL) 50 mg capsule Take 50 mg by mouth daily at bedtime 7/2/22  Yes Historical Provider, MD   Jardiance 10 MG TABS Take 10 mg by mouth every morning 7/20/22  Yes Historical Provider, MD   losartan-hydrochlorothiazide (HYZAAR) 100-25 MG per tablet Take 1 tablet by mouth every morning 12/17/18  Yes Historical Provider, MD   lovastatin (MEVACOR) 20 mg tablet Take 20 mg by mouth every morning 12/17/18  Yes Historical Provider, MD   metFORMIN (GLUCOPHAGE) 1000 MG tablet Take 1,000 mg by mouth 2 (two) times a day with meals  1/2/19  Yes Historical Provider, MD   methocarbamol (ROBAXIN) 750 mg tablet Take 1 tablet (750 mg total) by mouth every 6 (six) hours 4/22/23  Yes Katja Oshea PA-C   METOPROLOL SUCCINATE ER PO Take 75 mg by mouth every morning   Yes Sabra Magaña DO   mirtazapine (REMERON) 45 MG tablet Take 45 mg by mouth daily at bedtime 12/11/18  Yes Historical Provider, MD   NIFEdipine (PROCARDIA XL) 30 mg 24 hr tablet Take 120 mg by mouth every morning Takes 90 mg and 30 mg =120 mg every AM 7/10/22  Yes Historical Provider, MD   NIFEdipine (PROCARDIA XL) 90 mg 24 hr tablet Take 120 mg by mouth every morning Takes 90 mg and 30 mg =120 mg every AM 12/17/18  Yes Historical Provider, MD   sertraline (ZOLOFT) 100 mg tablet Take 100 mg by mouth every morning   Yes Historical Provider, MD   tamsulosin (FLOMAX) 0.4 mg Take 1 capsule (0.4 mg total) by mouth daily with dinner 7/18/23  Yes Phyllis Freeman MD   ACCJACKELINE FABIANO PLUS test strip 4 (four) times a day 12/17/18   Historical Provider, MD ANDERSON FASTCLIX LANCETS MISC 4 (four) times a day 12/22/18   Historical Provider, MD   fluticasone (FLONASE) 50 mcg/act nasal spray 1 spray into each nostril 2 (two) times a day    Historical Provider, MD   ibuprofen (MOTRIN) 800 mg tablet Take 800 mg by mouth every 6 (six) hours as needed for mild pain  Patient not taking: Reported on 10/9/2023    Historical Provider, MD   ketoconazole (NIZORAL) 2 % shampoo Apply 1 application. topically daily Use as directed 10/10/18   Historical Provider, MD Luiz Mcintyrear 100 units/mL SOPN Inject 30 Units under the skin every morning  Patient not taking: Reported on 10/9/2023 7/10/22   Historical Provider, MD   methylPREDNISolone 4 MG tablet therapy pack Use as directed on package. Do not take NSAIDS when taking this medication  Patient not taking: Reported on 10/9/2023 10/2/23   RUIZ Berg   metoprolol succinate (TOPROL-XL) 50 mg 24 hr tablet TAKE 1 AND 1/2 tabkets BY MOUTH ONCE A DAY  Patient not taking: Reported on 8/15/2023 2/18/23   Historical Provider, MD   naproxen (Naprosyn) 500 mg tablet Take 1 tablet (500 mg total) by mouth 2 (two) times a day with meals 8/30/23   Eda Peter PA-C   polyethylene glycol (Golytely) 4000 mL solution Take 4,000 mL by mouth once for 1 dose Take 4000 mL by mouth once for 1 dose. Use as directed 5/3/23 8/15/23  Cathie Kussmaul, MD   ARIPiprazole (ABILIFY) 30 mg tablet Take 30 mg by mouth daily at bedtime  Patient not taking: Reported on 7/29/2022 12/17/18 8/9/22  Historical Provider, MD   [facility] 250 MCG tablet TAKE 1 TABLET BY MOUTH EVERY DAY BUT DO NOT TAKE ON SUNDAY OR WEDNESDAY  Patient not taking: Reported on 7/29/2022 12/17/18 8/9/22  Historical Provider, MD   glipiZIDE (GLUCOTROL XL) 10 mg 24 hr tablet Take 10 mg by mouth 2 (two) times a day.  Indications: Type 2 Diabetes 9/8/22 9/8/22  Sabra Magaña DO   HUMULIN N 100 UNIT/ML subcutaneous injection INJECT 40 UNITS IN THE MORNING AND 6 UNITS in THE IN THE EVENING AS DIRECTED  Patient not taking: Reported on 8/9/2022 12/27/18 8/9/22  Historical Provider, MD   ondansetron (ZOFRAN) 4 mg tablet Take 1 tablet (4 mg total) by mouth every 6 (six) hours as needed for nausea or vomiting  Patient not taking: Reported on 7/29/2022 4/29/22 8/9/22  Dot Westfall Linnea Montemayor,    propranolol (INDERAL LA) 160 mg Take 160 mg by mouth daily 18  Historical Provider, MD     I have reviewed home medications with patient family member. Allergies: Allergies   Allergen Reactions    Abilify [Aripiprazole] Tremor     Shaking      Cephalexin Rash    Molds [employer] Smuts Allergic Rhinitis    Pregabalin Tremor     Lyrica - shaking feeling       Social History:  Marital Status: /Civil Union   Occupation: retired  Patient Pre-hospital Living Situation: With spouse  Patient Pre-hospital Level of Mobility: walks with person assist  Patient Pre-hospital Diet Restrictions: none  Substance Use History:   Social History     Substance and Sexual Activity   Alcohol Use Yes    Alcohol/week: 4.0 - 7.0 standard drinks of alcohol    Types: 1 - 2 Glasses of wine, 2 - 3 Cans of beer, 1 - 2 Shots of liquor per week    Comment: only on special occasions     Social History     Tobacco Use   Smoking Status Former    Packs/day: 0.25    Years: 5.00    Total pack years: 1.25    Types: Cigarettes    Start date: 1975    Quit date: 1981    Years since quittin.3   Smokeless Tobacco Never   Tobacco Comments    quit      Social History     Substance and Sexual Activity   Drug Use Not Currently    Frequency: 7.0 times per week    Types: Marijuana    Comment: Medical Marijuana       Family History:  Family History   Problem Relation Age of Onset    No Known Problems Father     No Known Problems Mother     Colon cancer Neg Hx        Physical Exam:     Vitals:   Blood Pressure: 163/71 (10/09/23 1510)  Pulse: 68 (10/09/23 1510)  Temperature: 98.2 °F (36.8 °C) (10/09/23 1510)  Temp Source: Oral (10/09/23 1030)  Respirations: 18 (10/09/23 1510)  Height: 6' 2" (188 cm) (10/09/23 1510)  Weight - Scale: 81.2 kg (179 lb 0.2 oz) (10/09/23 1510)  SpO2: 98 % (10/09/23 1451)    Physical Exam  Vitals and nursing note reviewed. Constitutional:       General: He is not in acute distress. Appearance: He is not diaphoretic. HENT:      Head: Normocephalic. Eyes:      General: No scleral icterus. Right eye: No discharge. Left eye: No discharge. Comments: Pinpoint pupils, slow reactive    Cardiovascular:      Rate and Rhythm: Normal rate and regular rhythm. Heart sounds: No murmur heard. Pulmonary:      Effort: Pulmonary effort is normal. No respiratory distress. Breath sounds: Normal breath sounds. No wheezing, rhonchi or rales. Abdominal:      General: There is no distension. Palpations: Abdomen is soft. Tenderness: There is no abdominal tenderness. There is no guarding or rebound. Musculoskeletal:      Cervical back: Normal range of motion. Right lower leg: No edema. Left lower leg: No edema. Skin:     General: Skin is warm. Coloration: Skin is pale. Neurological:      Mental Status: He is alert and oriented to person, place, and time. Motor: No weakness. Comments: No weakness LE bilateral, chronic sensory deficit bilateral. No urinary/bowel changes    Psychiatric:         Mood and Affect: Mood normal.         Behavior: Behavior normal.         Thought Content:  Thought content normal.         Judgment: Judgment normal.          Additional Data:     Lab Results:  Results from last 7 days   Lab Units 10/09/23  1039   WBC Thousand/uL 12.33*   HEMOGLOBIN g/dL 8.6*   HEMATOCRIT % 31.4*   PLATELETS Thousands/uL 501*   NEUTROS PCT % 65   LYMPHS PCT % 23   MONOS PCT % 7   EOS PCT % 3     Results from last 7 days   Lab Units 10/09/23  1039   SODIUM mmol/L 137   POTASSIUM mmol/L 4.3   CHLORIDE mmol/L 100   CO2 mmol/L 30   BUN mg/dL 20   CREATININE mg/dL 0.95   ANION GAP mmol/L 7   CALCIUM mg/dL 9.2   ALBUMIN g/dL 4.4   TOTAL BILIRUBIN mg/dL 0.32   ALK PHOS U/L 75   ALT U/L 12   AST U/L 21   GLUCOSE RANDOM mg/dL 174*     Results from last 7 days   Lab Units 10/09/23  1208   INR  0.96             Results from last 7 days   Lab Units 10/09/23  1208   LACTIC ACID mmol/L 2.0   PROCALCITONIN ng/ml 0.09       Lines/Drains:  Invasive Devices       Peripheral Intravenous Line  Duration             Peripheral IV 10/09/23 Left Antecubital <1 day              Drain  Duration             Urethral Catheter Coude 16 Fr. 104 days                  Urinary Catheter:  Goal for removal: Voiding trial when ambulation improves             Imaging: Reviewed radiology reports from this admission including: abdominal/pelvic CT and xray(s)  XR spine lumbar minimum 4 views non injury   Final Result by Jesus Emery MD (10/09 1527)      The orthopedic hardware is intact. No significant spondylolisthesis with flexion or extension. Irregular endplate at B8-Y6. Workstation performed: [id number]         TRAUMA - CT head wo contrast   Final Result by Chioma Shah MD (10/09 1125)      No acute intracranial abnormality. Diffuse atrophic changes. Workstation performed: [id number]         TRAUMA - CT spine cervical wo contrast   Final Result by Chioma Shah MD (10/09 1136)      No cervical spine fracture or traumatic malalignment. Advanced multilevel degenerative changes. Workstation performed: [id number]         TRAUMA - CT spine lumbar wo contrast   Final Result by Chioma Shah MD (10/09 1147)   No evidence of fracture or traumatic malalignment. Chronic findings, as per the body of the report. Development of lucency around the S1 bilateral screws up to 6 mm, concerning for loosening. No destructive changes in the bone but correlate clinically to exclude symptoms of infection. Workstation performed: [id number]         XR Trauma chest portable   Final Result by Geni Moseley MD (10/09 1047)      No acute cardiopulmonary disease.       Findings are stable            Workstation performed: [id number]             EKG and Other Studies Reviewed on Admission:       ** Please Note: This note has been constructed using a voice recognition system.  **

## 2023-10-09 NOTE — NURSING NOTE
Dr. Daniel Chavez made aware pt is lethargic with pinpoint pupils and oxygen saturating dropping in the 70's while asleep. States will come see pt.  Awaiting orders

## 2023-10-09 NOTE — ED PROVIDER NOTES
Emergency Department Trauma Note  Celestino Carlson 79 y.o. male MRN: [id number]  Unit/Bed#: ED 01/ED 01 Encounter: [id number]      Trauma Alert: Trauma Acuity: Trauma Evaluation  Model of Arrival: Mode of Arrival: BLS via Trauma Squad Name and Number: [id number]  Trauma Team: Current Providers  Attending Provider: Yosef Ngo MD  Attending Provider: Aníbal Briones MD  Registered Nurse: Harpal Jones RN  Consulting Physician: Lazarus Ramirez MD  Consultants:     None      History of Present Illness     Chief Complaint:   Chief Complaint   Patient presents with    Fall     HPI:  Celestino Carlson is a 79 y.o. male who presents with fall. Mechanism:Details of Incident: patient reports losing balance and falling to ground after having pain while getting up off commode at home Injury Date: 10/09/23 Injury Time: 0730 Injury Occurence Location - [address] [place] [address] Street: [facility]    79year old male brought by EMS for evaluation after falling this morning around 7:30 am.  Patient states he has had chronic low back pain with surgical fixation this past March. He had a sudden twinge of pain while sitting on the toilet causing him to fall to the left side, striking the back of his head on the tile floor. Back pain has been worse than usual since the fall over the bilateral low back. Patient takes aspirin daily. No anticoagulation. According to his wife, he has been falling frequently with at least 3 falls today. He had been having chills, but no fevers. No bowel issues. He states he has to cath himself three times per day ever since his surgery.       Review of Systems    Historical Information     Immunizations:   Immunization History   Administered Date(s) Administered    COVID-19 PFIZER VACCINE 0.3 ML IM 03/15/2021, 03/26/2021, 04/16/2021    COVID-19 Pfizer vac (Eliazar-sucrose, gray cap) 12 yr+ IM 10/29/2021    Influenza Split 09/29/2011, 09/07/2012    Influenza Split High Dose Preservative Free IM 10/24/2022 Pneumococcal Conjugate Vaccine 20-valent (Pcv20), Polysace 10/24/2022    Pneumococcal Polysaccharide PPV23 2001    Tdap 2012, 2019    Zoster Vaccine Recombinant 2020, 10/29/2020       Past Medical History:   Diagnosis Date    Anxiety     Arthritis     Cancer ([address])     Depression     Diabetes mellitus ([address])     Hypertension     Liver disease     Mitral valve prolapse     Peripheral neuropathy     Neuropathy    PONV (postoperative nausea and vomiting)     Thyroid disease        Family History   Problem Relation Age of Onset    No Known Problems Father     No Known Problems Mother     Colon cancer Neg Hx      Past Surgical History:   Procedure Laterality Date    COLONOSCOPY      INCISION AND DRAINAGE OF WOUND Left 2022    Procedure: INCISION AND DRAINAGE (I&D) EXTREMITY;  Surgeon: Daniel Guallpa DPM;  Location:  MAIN OR;  Service: Podiatry    JOINT REPLACEMENT Left 2022    Left TSA    MI ARTHRODESIS POSTERIOR/PSTLAT TQ 1NTRSPC LUMBAR Bilateral 2023    Procedure: L1-S1 navigated posterior decompression with instrumented fixation fusion;  Surgeon: Rozanna Gaucher, MD;  Location:  MAIN OR;  Service: Neurosurgery    THYROID SURGERY      remove cancer     Social History     Tobacco Use    Smoking status: Former     Packs/day: 0.25     Years: 5.00     Total pack years: 1.25     Types: Cigarettes     Start date: 1975     Quit date: 1981     Years since quittin.3    Smokeless tobacco: Never    Tobacco comments:     quit    Vaping Use    Vaping Use: Some days   Substance Use Topics    Alcohol use:  Yes     Alcohol/week: 4.0 - 7.0 standard drinks of alcohol     Types: 1 - 2 Glasses of wine, 2 - 3 Cans of beer, 1 - 2 Shots of liquor per week     Comment: only on special occasions    Drug use: Not Currently     Frequency: 7.0 times per week     Types: Marijuana     Comment: Medical Marijuana     E-Cigarette/Vaping    E-Cigarette Use Current Some Day User Comments medical marijuana - occ      E-Cigarette/Vaping Substances    Nicotine No     THC No     CBD No     Flavoring No     Other No     Unknown No        Family History: non-contributory    Meds/Allergies   Prior to Admission Medications   Prescriptions Last Dose Informant Patient Reported? Taking? ACCU-CHEK FABIANO PLUS test strip  Self Yes No   Si (four) times a day   ACCU-CHEK FASTCLIX LANCETS MISC  Self Yes No   Si (four) times a day   CHOLECALCIFEROL PO  Self Yes No   Sig: Take 5,000 Units by mouth daily   GNP Aspirin Low Dose 81 MG EC tablet 10/9/2023 Self Yes Yes   Sig: Take 1 tablet (81 mg total) by mouth daily Do not start before 2023.    Jardiance 10 MG TABS  Self Yes No   Sig: Take 10 mg by mouth every morning   Lantus SoloStar 100 units/mL SOPN  Self Yes No   Sig: Inject 30 Units under the skin every morning   METOPROLOL SUCCINATE ER PO  Self Yes No   Sig: Take 75 mg by mouth every morning   NIFEdipine (PROCARDIA XL) 30 mg 24 hr tablet  Self Yes No   Sig: Take 120 mg by mouth every morning Takes 90 mg and 30 mg =120 mg every AM   NIFEdipine (PROCARDIA XL) 90 mg 24 hr tablet  Self Yes No   Sig: Take 120 mg by mouth every morning Takes 90 mg and 30 mg =120 mg every AM   acetaminophen (TYLENOL) 500 mg tablet  Self No No   Sig: Take 1 tablet (500 mg total) by mouth every 6 (six) hours as needed for mild pain 500 mg tablet   clonazePAM (KlonoPIN) 1 mg tablet  Self Yes No   Sig: Take 1 mg by mouth 2 (two) times a day & 3rd pill PRN   finasteride (PROSCAR) 5 mg tablet  Self No No   Sig: Take 1 tablet (5 mg total) by mouth daily   fluticasone (FLONASE) 50 mcg/act nasal spray  Self Yes No   Si spray into each nostril 2 (two) times a day   folic acid (FOLVITE) 1 mg tablet  Self Yes No   Sig: Take 2,000 mcg by mouth daily   gabapentin (NEURONTIN) 400 mg capsule   No No   Sig: Take 1 tablet PO 4 times a day   hydrOXYzine pamoate (VISTARIL) 50 mg capsule  Self Yes No   Sig: Take 50 mg by mouth daily at bedtime   ibuprofen (MOTRIN) 800 mg tablet  Self Yes No   Sig: Take 800 mg by mouth every 6 (six) hours as needed for mild pain   ketoconazole (NIZORAL) 2 % shampoo  Self Yes No   Sig: Apply 1 application. topically daily Use as directed   losartan-hydrochlorothiazide (HYZAAR) 100-25 MG per tablet  Self Yes No   Sig: Take 1 tablet by mouth every morning   lovastatin (MEVACOR) 20 mg tablet  Self Yes No   Sig: Take 20 mg by mouth every morning   metFORMIN (GLUCOPHAGE) 1000 MG tablet  Self Yes No   Sig: Take 1,000 mg by mouth 2 (two) times a day with meals    methocarbamol (ROBAXIN) 750 mg tablet  Self No No   Sig: Take 1 tablet (750 mg total) by mouth every 6 (six) hours   methylPREDNISolone 4 MG tablet therapy pack   No No   Sig: Use as directed on package. Do not take NSAIDS when taking this medication   metoprolol succinate (TOPROL-XL) 50 mg 24 hr tablet  Self Yes No   Sig: TAKE 1 AND 1/2 tabkets BY MOUTH ONCE A DAY   Patient not taking: Reported on 8/15/2023   mirtazapine (REMERON) 45 MG tablet  Self Yes No   Sig: Take 45 mg by mouth daily at bedtime   naproxen (Naprosyn) 500 mg tablet  Self No No   Sig: Take 1 tablet (500 mg total) by mouth 2 (two) times a day with meals   Patient not taking: Reported on 9/13/2023   polyethylene glycol (Golytely) 4000 mL solution  Spouse/Significant Other No No   Sig: Take 4,000 mL by mouth once for 1 dose Take 4000 mL by mouth once for 1 dose.  Use as directed   sertraline (ZOLOFT) 100 mg tablet  Self Yes No   Sig: Take 100 mg by mouth every morning   tamsulosin (FLOMAX) 0.4 mg  Self No No   Sig: Take 1 capsule (0.4 mg total) by mouth daily with dinner      Facility-Administered Medications: None       Allergies   Allergen Reactions    Abilify [Aripiprazole] Tremor     Shaking      Cephalexin Rash    Molds & Smuts Allergic Rhinitis    Pregabalin Tremor     Lyrica - shaking feeling       PHYSICAL EXAM    PE limited by: none    Objective   Vitals:   First set: Temperature: 98.4 °F (36.9 °C) (10/09/23 1030)  Pulse: 73 (10/09/23 1030)  Respirations: 16 (10/09/23 1030)  Blood Pressure: (!) 199/94 (10/09/23 1030)  SpO2: 99 % (10/09/23 1030)    Primary Survey:   (A) Airway: patent  (B) Breathing: bilateral breath sounds  (C) Circulation: Pulses:   normal  (D) Disabliity:  GCS Total:  15  (E) Expose:  Completed    Secondary Survey: (Click on Physical Exam tab above)  Physical Exam  Vitals and nursing note reviewed. HENT:      Head: Normocephalic. Eyes:      Conjunctiva/sclera: Conjunctivae normal.   Cardiovascular:      Rate and Rhythm: Normal rate and regular rhythm. Pulses: Normal pulses. Heart sounds: Normal heart sounds. Pulmonary:      Effort: Pulmonary effort is normal. No respiratory distress. Breath sounds: Normal breath sounds. Abdominal:      General: There is no distension. Palpations: Abdomen is soft. Tenderness: There is no abdominal tenderness. Musculoskeletal:         General: No deformity. Comments: No midline C/T spine tenderness. L spine tenderness. No step offs or deformities. No pelvic tenderness or instability. Skin:     General: Skin is warm and dry. Neurological:      Comments: Slight decrease in sensation between left 1st and 2nd toes. Sensation otherwise intact. 5/5 dorsiflexion and plantar flexion. Cervical spine cleared by clinical criteria?  No (imaging required)      Invasive Devices       Peripheral Intravenous Line  Duration             Peripheral IV 10/09/23 Left Antecubital <1 day              Drain  Duration             Urethral Catheter Coude 16 Fr. 103 days                    Lab Results:   Results Reviewed       Procedure Component Value Units Date/Time    Procalcitonin [[id number]]  (Normal) Collected: 10/09/23 1208    Lab Status: Final result Specimen: Blood from Arm, Left Updated: 10/09/23 1250     Procalcitonin 0.09 ng/ml     Lactic acid, plasma (w/reflex if result > 2.0) [[id number]] (Normal) Collected: 10/09/23 1208    Lab Status: Final result Specimen: Blood from Arm, Left Updated: 10/09/23 1237     LACTIC ACID 2.0 mmol/L     Narrative:      Result may be elevated if tourniquet was used during collection. Benjamin Aj [[id number]]  (Normal) Collected: 10/09/23 1208    Lab Status: Final result Specimen: Blood from Arm, Left Updated: 10/09/23 1234     Protime 13.2 seconds      INR 0.96    APTT [[id number]]  (Normal) Collected: 10/09/23 1208    Lab Status: Final result Specimen: Blood from Arm, Left Updated: 10/09/23 1234     PTT 27 seconds     Blood culture #2 [[id number]] Collected: 10/09/23 1214    Lab Status: In process Specimen: Blood from Arm, Left Updated: 10/09/23 1217    Blood culture #1 [[id number]] Collected: 10/09/23 1208    Lab Status:  In process Specimen: Blood from Arm, Left Updated: 10/09/23 1214    Comprehensive metabolic panel [[id number]]  (Abnormal) Collected: 10/09/23 1039    Lab Status: Final result Specimen: Blood from Arm, Left Updated: 10/09/23 1106     Sodium 137 mmol/L      Potassium 4.3 mmol/L      Chloride 100 mmol/L      CO2 30 mmol/L      ANION GAP 7 mmol/L      BUN 20 mg/dL      Creatinine 0.95 mg/dL      Glucose 174 mg/dL      Calcium 9.2 mg/dL      AST 21 U/L      ALT 12 U/L      Alkaline Phosphatase 75 U/L      Total Protein 7.5 g/dL      Albumin 4.4 g/dL      Total Bilirubin 0.32 mg/dL      eGFR 83 ml/min/1.73sq m     Narrative:      [place] guidelines for Chronic Kidney Disease (CKD):     Stage 1 with normal or high GFR (GFR > 90 mL/min/1.73 square meters)    Stage 2 Mild CKD (GFR = 60-89 mL/min/1.73 square meters)    Stage 3A Moderate CKD (GFR = 45-59 mL/min/1.73 square meters)    Stage 3B Moderate CKD (GFR = 30-44 mL/min/1.73 square meters)    Stage 4 Severe CKD (GFR = 15-29 mL/min/1.73 square meters)    Stage 5 End Stage CKD (GFR <15 mL/min/1.73 square meters)  Note: GFR calculation is accurate only with a steady state creatinine CBC and differential [[id number]]  (Abnormal) Collected: 10/09/23 1039    Lab Status: Final result Specimen: Blood from Arm, Left Updated: 10/09/23 1046     WBC 12.33 Thousand/uL      RBC 4.28 Million/uL      Hemoglobin 8.6 g/dL      Hematocrit 31.4 %      MCV 73 fL      MCH 20.1 pg      MCHC 27.4 g/dL      RDW 19.9 %      MPV 9.6 fL      Platelets 223 Thousands/uL      nRBC 0 /100 WBCs      Neutrophils Relative 65 %      Immat GRANS % 1 %      Lymphocytes Relative 23 %      Monocytes Relative 7 %      Eosinophils Relative 3 %      Basophils Relative 1 %      Neutrophils Absolute 8.10 Thousands/µL      Immature Grans Absolute 0.06 Thousand/uL      Lymphocytes Absolute 2.87 Thousands/µL      Monocytes Absolute 0.80 Thousand/µL      Eosinophils Absolute 0.36 Thousand/µL      Basophils Absolute 0.14 Thousands/µL                    Imaging Studies:   Direct to CT: No  TRAUMA - CT head wo contrast   Final Result by Melody Davis MD (10/09 1125)      No acute intracranial abnormality. Diffuse atrophic changes. Workstation performed: [id number]         TRAUMA - CT spine cervical wo contrast   Final Result by Melody Davis MD (10/09 1136)      No cervical spine fracture or traumatic malalignment. Advanced multilevel degenerative changes. Workstation performed: [id number]         TRAUMA - CT spine lumbar wo contrast   Final Result by Melody Davis MD (10/09 1147)   No evidence of fracture or traumatic malalignment. Chronic findings, as per the body of the report. Development of lucency around the S1 bilateral screws up to 6 mm, concerning for loosening. No destructive changes in the bone but correlate clinically to exclude symptoms of infection. Workstation performed: [id number]         XR Trauma chest portable   Final Result by Caitlin Waterman MD (10/09 1047)      No acute cardiopulmonary disease.       Findings are stable            Workstation performed: [id number] XR spine lumbar minimum 4 views non injury    (Results Pending)         Procedures  Universal Protocol:  Consent: Verbal consent obtained. Risks and benefits: risks, benefits and alternatives were discussed  Consent given by: patient  Required items: required blood products, implants, devices, and special equipment available  Patient identity confirmed: verbally with patient and arm band  Laceration repair    Date/Time: 10/9/2023 12:09 PM    Performed by: Maday Mcfadden MD  Authorized by: Maday Mcfadden MD  Body area: head/neck  Location details: scalp  Laceration length: 2 cm  Foreign bodies: no foreign bodies  Tendon involvement: none  Nerve involvement: none  Vascular damage: no  Anesthesia: local infiltration    Anesthesia:  Local Anesthetic: lidocaine 1% with epinephrine      Procedure Details:  Irrigation solution: saline  Irrigation method: syringe  Debridement: none  Degree of undermining: none  Skin closure: staples (3)  Approximation: close  Approximation difficulty: simple  Patient tolerance: patient tolerated the procedure well with no immediate complications               ED Course  ED Course as of 10/09/23 1353   Mon Oct 09, 2023   1124 Last tetanus 2019 per medical record   1139 Hemoglobin(!): 8.6  Stable chronic anemia. 7.9 five months ago           Medical Decision Making  79year old male presents for evaluation after falling this morning. 2 cm scalp laceration on exam.  Lumbar tenderness. Per medical records, patient underwent lumbar fusion 4/11/23 with Dr. Frank Reeder. CT lumbar concerning for screw loosening vs infection. Only infectious symptom is subjective chills. Nonspecific leukocytosis on labs. Blood cultures ordered. Negative procalcitonin. Lactate WNL. Discussed with neurosurgery. Patient admitted for further evaluation and management as well as possible placement for acute rehab given ambulatory issues.     Amount and/or Complexity of Data Reviewed  Labs: ordered. Decision-making details documented in ED Course. Radiology: ordered and independent interpretation performed. Risk  OTC drugs. Prescription drug management. Disposition  [facility] Transfer: No  Final diagnoses:   Fall, initial encounter   Abnormal CT scan, lumbar spine   Laceration of scalp, initial encounter   Ambulatory dysfunction     Time reflects when diagnosis was documented in both MDM as applicable and the Disposition within this note       Time User Action Codes Description Comment    10/9/2023 11:58 AM Yvette Zepeda Add [Z25. Tamra Boards Fall, initial encounter     10/9/2023 11:58 AM Tyra Martinez Add [R93.7] Abnormal CT scan, lumbar spine     10/9/2023 11:59 AM JuanTyra reese Add [S01.01XA] Laceration of scalp, initial encounter     10/9/2023 12:40 PM Yvette Zpeeda Add [R26.2] Ambulatory dysfunction           ED Disposition       ED Disposition   Admit    Condition   Stable    Date/Time   Mon Oct 9, 2023  1:52 PM    Comment   Case was discussed with JOANNA and the patient's admission status was agreed to be Admission Status: observation status to the service of Dr. Maryanne Merlin . Follow-up Information    None       Patient's Medications   Discharge Prescriptions    No medications on file     No discharge procedures on file.     PDMP Review         Value Time User    PDMP Reviewed  Yes 4/22/2023  6:39 AM Katja Muller PA-C            ED Provider  Electronically Signed by           Dirk May MD  10/09/23 2260

## 2023-10-09 NOTE — ASSESSMENT & PLAN NOTE
Patient's status post T12-S1 fusion with bilateral laminectomy and facetectomy in April 2023. Patient reported continuous of back pain since, radiating bilateral legs. Per wife and patient, the pain was disabling, patient mostly resting in bed also with decreased oral intake. Was seen as outpatient by acute pain management and was started on gabapentin that was slowly titrated to 400 mg 4 times a day. Per wife, patient somnolent after gabapentin. Was on steroids before, not currently  Also on medical marijuana-last dose last night. Not on opiates  Because of fall, patient had CT lumbar that showed development of lucency S1 around bilateral screws discerning for loosening. No bone destruction but correlation with infection, clinically   neurosurgery was consulted, recommended upright x-ray lumbar spine  Patient has milld leukocytosis that might be reactive, has normal procalcitonin. No concern of infection at this point.   Pain management

## 2023-10-09 NOTE — TELEMEDICINE
e-Consult (IPC)     Inpatient consult to Neurosurgery  Consult performed by: Durga Trammell PA-C  Consult ordered by: Judi Encinas MD           Contacted by Dr. Author Guadarrama at [facility]. Hari Brantleyan 79 y.o. male MRN: [id number]  Unit/Bed#: ED 01 Encounter: [id number]    Reason for Consult    Per provider report, patient presents with multiple falls and increased back pain at the level of his hips. Patient is known to the neurosurgery office status post T12-S1 posterior instrumented fixation fusion, L1-2 and L4-5 bilateral laminectomies and total facetectomies, L2-3 and L3-4 bilateral laminectomies and medial facetectomies, L5-S1 bilateral laminal foraminotomies on 4/11/2023 by Dr. Le Lane in the setting of neurogenic claudication and radiculopathy. Last seen in the office 8/30/2023 where he reported no real improvement since surgery; complained of weakness from the waist down, back pain into posterior thighs, ongoing peripheral neuropathy at baseline and continues to self cath. He is scheduled to see NSX in the office 12/2023. Available past medical history,social history, surgical history, medication list, drug allergies and review of systems were reviewed. /75   Pulse 67   Temp 98.4 °F (36.9 °C) (Oral)   Resp 16   SpO2 97%      Clinical exam per provider report, lumbar tenderness on exam, 5 out of 5 dorsiflexion and plantarflexion, slight decrease in sensation between first and second toes on the left, otherwise intact, self caths 3 times a day ever since surgery and that has been unchanged, no bowel complaints. Imaging personally reviewed. · CT spine lumbar wo contrast 10/9/2023: No evidence of fracture or traumatic malalignment. Chronic findings, as per the body of the report. Development of lucency around the S1 bilateral screws up to 6 mm, concerning for loosening. No destructive changes in the bone but correlate clinically to exclude symptoms of infection.     Assessment and Recommendations    1. Imaging reviewed, concern for hardware loosening most notable at bilateral SI screws  2. Plan for upright XR lumbar spine with flexion and extension views to assess stability of hardware  3. Consider infectious work up given slight leukocytosis and subjective chills although currently afebrile  4. No need for transfer at this time  5. Rest of care per primary team  6. Neurosurgery will review imaging once completed  7. Reach out with any questions or concerns    All questions answered. Provider is in agreement with the course of action. 5-10 minutes, >50% of the total time devoted to medical consultative verbal/EMR discussion between providers. Written report will be generated in the EMR.

## 2023-10-09 NOTE — ASSESSMENT & PLAN NOTE
Lab Results   Component Value Date    HGBA1C 6.3 (H) 03/14/2023       No results for input(s): "POCGLU" in the last 72 hours. Blood Sugar Average: Last 72 hrs: On Jardiance as outpatient. Also because of poor oral intake patient was not taking his 30 units of Lantus in the morning. Hold oral medication. Start Lantus 10 units at bedtime, add sliding scale insulin.   Accu-Chek  Diabetic diet

## 2023-10-09 NOTE — ASSESSMENT & PLAN NOTE
Lab Results   Component Value Date    HGBA1C 6.3 (H) 03/14/2023       No results for input(s): "POCGLU" in the last 72 hours. Blood Sugar Average: Last 72 hrs:  Chronic, currently on gabapentin.   Had side effects on Lyrica  We will hold gabapentin for now

## 2023-10-10 ENCOUNTER — APPOINTMENT (OUTPATIENT)
Dept: PHYSICAL THERAPY | Facility: CLINIC | Age: 68
End: 2023-10-10
Payer: COMMERCIAL

## 2023-10-10 ENCOUNTER — TELEPHONE (OUTPATIENT)
Dept: NEUROSURGERY | Facility: CLINIC | Age: 68
End: 2023-10-10

## 2023-10-10 PROBLEM — D50.9 HYPOCHROMIC MICROCYTIC ANEMIA: Status: ACTIVE | Noted: 2023-10-10

## 2023-10-10 LAB
ANION GAP SERPL CALCULATED.3IONS-SCNC: 7 MMOL/L
BUN SERPL-MCNC: 16 MG/DL (ref 5–25)
CALCIUM SERPL-MCNC: 9.1 MG/DL (ref 8.4–10.2)
CHLORIDE SERPL-SCNC: 100 MMOL/L (ref 96–108)
CO2 SERPL-SCNC: 28 MMOL/L (ref 21–32)
CREAT SERPL-MCNC: 0.76 MG/DL (ref 0.6–1.3)
ERYTHROCYTE [DISTWIDTH] IN BLOOD BY AUTOMATED COUNT: 19.8 % (ref 11.6–15.1)
GFR SERPL CREATININE-BSD FRML MDRD: 95 ML/MIN/1.73SQ M
GLUCOSE SERPL-MCNC: 106 MG/DL (ref 65–140)
GLUCOSE SERPL-MCNC: 137 MG/DL (ref 65–140)
GLUCOSE SERPL-MCNC: 158 MG/DL (ref 65–140)
GLUCOSE SERPL-MCNC: 192 MG/DL (ref 65–140)
GLUCOSE SERPL-MCNC: 227 MG/DL (ref 65–140)
HCT VFR BLD AUTO: 29.3 % (ref 36.5–49.3)
HGB BLD-MCNC: 8.4 G/DL (ref 12–17)
MCH RBC QN AUTO: 20.5 PG (ref 26.8–34.3)
MCHC RBC AUTO-ENTMCNC: 28.7 G/DL (ref 31.4–37.4)
MCV RBC AUTO: 72 FL (ref 82–98)
PLATELET # BLD AUTO: 482 THOUSANDS/UL (ref 149–390)
PMV BLD AUTO: 9.9 FL (ref 8.9–12.7)
POTASSIUM SERPL-SCNC: 3.8 MMOL/L (ref 3.5–5.3)
RBC # BLD AUTO: 4.09 MILLION/UL (ref 3.88–5.62)
SODIUM SERPL-SCNC: 135 MMOL/L (ref 135–147)
WBC # BLD AUTO: 9.81 THOUSAND/UL (ref 4.31–10.16)

## 2023-10-10 PROCEDURE — 87186 SC STD MICRODIL/AGAR DIL: CPT | Performed by: INTERNAL MEDICINE

## 2023-10-10 PROCEDURE — 87086 URINE CULTURE/COLONY COUNT: CPT | Performed by: INTERNAL MEDICINE

## 2023-10-10 PROCEDURE — 99232 SBSQ HOSP IP/OBS MODERATE 35: CPT | Performed by: INTERNAL MEDICINE

## 2023-10-10 PROCEDURE — 97163 PT EVAL HIGH COMPLEX 45 MIN: CPT

## 2023-10-10 PROCEDURE — 87077 CULTURE AEROBIC IDENTIFY: CPT | Performed by: INTERNAL MEDICINE

## 2023-10-10 PROCEDURE — 80048 BASIC METABOLIC PNL TOTAL CA: CPT | Performed by: INTERNAL MEDICINE

## 2023-10-10 PROCEDURE — 85027 COMPLETE CBC AUTOMATED: CPT | Performed by: INTERNAL MEDICINE

## 2023-10-10 PROCEDURE — 82948 REAGENT STRIP/BLOOD GLUCOSE: CPT

## 2023-10-10 PROCEDURE — 97167 OT EVAL HIGH COMPLEX 60 MIN: CPT

## 2023-10-10 RX ORDER — LANOLIN ALCOHOL/MO/W.PET/CERES
3 CREAM (GRAM) TOPICAL
Status: DISCONTINUED | OUTPATIENT
Start: 2023-10-10 | End: 2023-10-11 | Stop reason: HOSPADM

## 2023-10-10 RX ORDER — CLONAZEPAM 0.5 MG/1
0.5 TABLET ORAL 2 TIMES DAILY PRN
Status: DISCONTINUED | OUTPATIENT
Start: 2023-10-10 | End: 2023-10-11 | Stop reason: HOSPADM

## 2023-10-10 RX ORDER — GABAPENTIN 300 MG/1
300 CAPSULE ORAL
Status: DISCONTINUED | OUTPATIENT
Start: 2023-10-10 | End: 2023-10-11 | Stop reason: HOSPADM

## 2023-10-10 RX ADMIN — HYDROMORPHONE HYDROCHLORIDE 0.2 MG: 0.2 INJECTION, SOLUTION INTRAMUSCULAR; INTRAVENOUS; SUBCUTANEOUS at 05:48

## 2023-10-10 RX ADMIN — ACETAMINOPHEN 975 MG: 325 TABLET, FILM COATED ORAL at 18:04

## 2023-10-10 RX ADMIN — Medication 3 MG: at 20:46

## 2023-10-10 RX ADMIN — INSULIN LISPRO 1 UNITS: 100 INJECTION, SOLUTION INTRAVENOUS; SUBCUTANEOUS at 08:17

## 2023-10-10 RX ADMIN — ACETAMINOPHEN 975 MG: 325 TABLET, FILM COATED ORAL at 11:44

## 2023-10-10 RX ADMIN — INSULIN LISPRO 2 UNITS: 100 INJECTION, SOLUTION INTRAVENOUS; SUBCUTANEOUS at 11:46

## 2023-10-10 RX ADMIN — HYDROCHLOROTHIAZIDE 25 MG: 25 TABLET ORAL at 08:20

## 2023-10-10 RX ADMIN — ACETAMINOPHEN 975 MG: 325 TABLET, FILM COATED ORAL at 02:06

## 2023-10-10 RX ADMIN — TAMSULOSIN HYDROCHLORIDE 0.4 MG: 0.4 CAPSULE ORAL at 16:11

## 2023-10-10 RX ADMIN — FINASTERIDE 5 MG: 5 TABLET, FILM COATED ORAL at 08:23

## 2023-10-10 RX ADMIN — HYDROMORPHONE HYDROCHLORIDE 0.2 MG: 0.2 INJECTION, SOLUTION INTRAMUSCULAR; INTRAVENOUS; SUBCUTANEOUS at 20:54

## 2023-10-10 RX ADMIN — LOSARTAN POTASSIUM 100 MG: 50 TABLET, FILM COATED ORAL at 08:23

## 2023-10-10 RX ADMIN — FOLIC ACID 2000 MCG: 1 TABLET ORAL at 08:21

## 2023-10-10 RX ADMIN — Medication 2.5 MG: at 02:08

## 2023-10-10 RX ADMIN — GABAPENTIN 300 MG: 300 CAPSULE ORAL at 20:47

## 2023-10-10 RX ADMIN — INSULIN LISPRO 1 UNITS: 100 INJECTION, SOLUTION INTRAVENOUS; SUBCUTANEOUS at 20:46

## 2023-10-10 RX ADMIN — MIRTAZAPINE 45 MG: 15 TABLET, FILM COATED ORAL at 20:46

## 2023-10-10 RX ADMIN — PRAVASTATIN SODIUM 20 MG: 20 TABLET ORAL at 16:12

## 2023-10-10 RX ADMIN — HYDROMORPHONE HYDROCHLORIDE 0.2 MG: 0.2 INJECTION, SOLUTION INTRAMUSCULAR; INTRAVENOUS; SUBCUTANEOUS at 00:53

## 2023-10-10 RX ADMIN — METHOCARBAMOL 750 MG: 500 TABLET ORAL at 00:10

## 2023-10-10 RX ADMIN — NIFEDIPINE 120 MG: 30 TABLET, EXTENDED RELEASE ORAL at 08:19

## 2023-10-10 RX ADMIN — METOPROLOL SUCCINATE 75 MG: 50 TABLET, EXTENDED RELEASE ORAL at 08:22

## 2023-10-10 RX ADMIN — SENNOSIDES AND DOCUSATE SODIUM 1 TABLET: 50; 8.6 TABLET ORAL at 20:47

## 2023-10-10 RX ADMIN — CLONAZEPAM 0.5 MG: 0.5 TABLET ORAL at 16:11

## 2023-10-10 RX ADMIN — SERTRALINE 100 MG: 100 TABLET, FILM COATED ORAL at 08:24

## 2023-10-10 RX ADMIN — CLONAZEPAM 0.5 MG: 0.5 TABLET ORAL at 20:53

## 2023-10-10 RX ADMIN — ASPIRIN 81 MG: 81 TABLET, COATED ORAL at 08:18

## 2023-10-10 RX ADMIN — ENOXAPARIN SODIUM 40 MG: 100 INJECTION SUBCUTANEOUS at 08:18

## 2023-10-10 RX ADMIN — CLONAZEPAM 1 MG: 1 TABLET ORAL at 08:24

## 2023-10-10 NOTE — PLAN OF CARE
Problem: MOBILITY - ADULT  Goal: Maintain or return to baseline ADL function  Description: INTERVENTIONS:  -  Assess patient's ability to carry out ADLs; assess patient's baseline for ADL function and identify physical deficits which impact ability to perform ADLs (bathing, care of mouth/teeth, toileting, grooming, dressing, etc.)  - Assess/evaluate cause of self-care deficits   - Assess range of motion  - Assess patient's mobility; develop plan if impaired  - Assess patient's need for assistive devices and provide as appropriate  - Encourage maximum independence but intervene and supervise when necessary  - Involve family in performance of ADLs  - Assess for home care needs following discharge   - Consider OT consult to assist with ADL evaluation and planning for discharge  - Provide patient education as appropriate  Outcome: Progressing  Goal: Maintains/Returns to pre admission functional level  Description: INTERVENTIONS:  - Perform BMAT or MOVE assessment daily.   - Set and communicate daily mobility goal to care team and patient/family/caregiver. - Collaborate with rehabilitation services on mobility goals if consulted  - Perform Range of Motion 3 times a day. - Reposition patient every 2 hours.   - Dangle patient 3 times a day  - Stand patient 3 times a day  - Ambulate patient 3 times a day  - Out of bed to chair 3 times a day   - Out of bed for meals 3 times a day  - Out of bed for toileting  - Record patient progress and toleration of activity level   Outcome: Progressing     Problem: Prexisting or High Potential for Compromised Skin Integrity  Goal: Skin integrity is maintained or improved  Description: INTERVENTIONS:  - Identify patients at risk for skin breakdown  - Assess and monitor skin integrity  - Assess and monitor nutrition and hydration status  - Monitor labs   - Assess for incontinence   - Turn and reposition patient  - Assist with mobility/ambulation  - Relieve pressure over bony prominences  - Avoid friction and shearing  - Provide appropriate hygiene as needed including keeping skin clean and dry  - Evaluate need for skin moisturizer/barrier cream  - Collaborate with interdisciplinary team   - Patient/family teaching  - Consider wound care consult   Outcome: Progressing     Problem: PAIN - ADULT  Goal: Verbalizes/displays adequate comfort level or baseline comfort level  Description: Interventions:  - Encourage patient to monitor pain and request assistance  - Assess pain using appropriate pain scale  - Administer analgesics based on type and severity of pain and evaluate response  - Implement non-pharmacological measures as appropriate and evaluate response  - Consider cultural and social influences on pain and pain management  - Notify physician/advanced practitioner if interventions unsuccessful or patient reports new pain  Outcome: Progressing     Problem: INFECTION - ADULT  Goal: Absence or prevention of progression during hospitalization  Description: INTERVENTIONS:  - Assess and monitor for signs and symptoms of infection  - Monitor lab/diagnostic results  - Monitor all insertion sites, i.e. indwelling lines, tubes, and drains  - Monitor endotracheal if appropriate and nasal secretions for changes in amount and color  - [place] appropriate cooling/warming therapies per order  - Administer medications as ordered  - Instruct and encourage patient and family to use good hand hygiene technique  - Identify and instruct in appropriate isolation precautions for identified infection/condition  Outcome: Progressing  Goal: Absence of fever/infection during neutropenic period  Description: INTERVENTIONS:  - Monitor WBC    Outcome: Progressing     Problem: SAFETY ADULT  Goal: Maintain or return to baseline ADL function  Description: INTERVENTIONS:  -  Assess patient's ability to carry out ADLs; assess patient's baseline for ADL function and identify physical deficits which impact ability to perform ADLs (bathing, care of mouth/teeth, toileting, grooming, dressing, etc.)  - Assess/evaluate cause of self-care deficits   - Assess range of motion  - Assess patient's mobility; develop plan if impaired  - Assess patient's need for assistive devices and provide as appropriate  - Encourage maximum independence but intervene and supervise when necessary  - Involve family in performance of ADLs  - Assess for home care needs following discharge   - Consider OT consult to assist with ADL evaluation and planning for discharge  - Provide patient education as appropriate  Outcome: Progressing  Goal: Maintains/Returns to pre admission functional level  Description: INTERVENTIONS:  - Perform BMAT or MOVE assessment daily.   - Set and communicate daily mobility goal to care team and patient/family/caregiver. - Collaborate with rehabilitation services on mobility goals if consulted  - Perform Range of Motion 3 times a day. - Reposition patient every 2 hours.   - Dangle patient 3 times a day  - Stand patient 3 times a day  - Ambulate patient 3 times a day  - Out of bed to chair 3 times a day   - Out of bed for meals 3 times a day  - Out of bed for toileting  - Record patient progress and toleration of activity level   Outcome: Progressing  Goal: Patient will remain free of falls  Description: INTERVENTIONS:  - Educate patient/family on patient safety including physical limitations  - Instruct patient to call for assistance with activity   - Consult OT/PT to assist with strengthening/mobility   - Keep Call bell within reach  - Keep bed low and locked with side rails adjusted as appropriate  - Keep care items and personal belongings within reach  - Initiate and maintain comfort rounds  - Make Fall Risk Sign visible to staff  - Offer Toileting every 2 Hours, in advance of need  - Initiate/Maintain bed/ chair alarm  - Obtain necessary fall risk management equipment: walker  - Apply yellow socks and bracelet for high fall risk patients  - Consider moving patient to room near nurses station  Outcome: Progressing     Problem: DISCHARGE PLANNING  Goal: Discharge to home or other facility with appropriate resources  Description: INTERVENTIONS:  - Identify barriers to discharge w/patient and caregiver  - Arrange for needed discharge resources and transportation as appropriate  - Identify discharge learning needs (meds, wound care, etc.)  - Arrange for interpretive services to assist at discharge as needed  - Refer to Case Management Department for coordinating discharge planning if the patient needs post-hospital services based on physician/advanced practitioner order or complex needs related to functional status, cognitive ability, or social support system  Outcome: Progressing     Problem: Knowledge Deficit  Goal: Patient/family/caregiver demonstrates understanding of disease process, treatment plan, medications, and discharge instructions  Description: Complete learning assessment and assess knowledge base.   Interventions:  - Provide teaching at level of understanding  - Provide teaching via preferred learning methods  Outcome: Progressing     Problem: Potential for Falls  Goal: Patient will remain free of falls  Description: INTERVENTIONS:  - Educate patient/family on patient safety including physical limitations  - Instruct patient to call for assistance with activity   - Consult OT/PT to assist with strengthening/mobility   - Keep Call bell within reach  - Keep bed low and locked with side rails adjusted as appropriate  - Keep care items and personal belongings within reach  - Initiate and maintain comfort rounds  - Make Fall Risk Sign visible to staff  - Offer Toileting every 2 Hours, in advance of need  - Initiate/Maintain bed/ chair alarm  - Obtain necessary fall risk management equipment: walker  - Apply yellow socks and bracelet for high fall risk patients  - Consider moving patient to room near nurses station  Outcome: Progressing

## 2023-10-10 NOTE — ASSESSMENT & PLAN NOTE
Recent Labs     10/09/23  1039 10/10/23  0352   HGB 8.6* 8.4*      Anemic since the back surgery back in April   Said he had colonoscopy in the past that were normal   Check iron panel

## 2023-10-10 NOTE — TREATMENT PLAN
Assessment:  · S/p T12-S1 posterior instrumented fixation fusion, L1-2 and L4-5 bilateral laminectomies and total facetectomies, L2-3 and L3-4 bilateral laminectomies and medial facetectomies, L5-S1 bilateral laminal foraminotomies on 4/11/2023 by Dr. Le Lane   · Bilateral SI screw loosening  · Frequent falls  · Acute on chronic back pain  · Self cath, chronic    Imaging:  · XR lumbar spine with bending views 10/9/2023: The orthopedic hardware is intact. No significant spondylolisthesis with flexion or extension. Irregular endplate at T8-C9. Plan:  • Imaging reviewed, no abnormal movement noted within the thoracolumbar spine in the setting of known hardware loosening at SI, no change in alignment. • At this time, would not offer any surgical intervention. While there is hardware loosening, there is no movement noted on bending films and no concern for any spinal instability. Surgery would be high risk of complication with known baseline function and other medical issues.    • No need for transfer at this time  • Continue with multimodal pain regimen, PT, outpatient pain management if warranted  • Agree with rehab placement   • Rest of care per primary team  • Follow up as scheduled in about 2 months with upright XR with bending views as previously recommended  • Neurosurgery to sign off, please reach out with questions or concerns

## 2023-10-10 NOTE — OCCUPATIONAL THERAPY NOTE
Occupational Therapy Evaluation     Patient Name: Lola Soria  Today's Date: 10/10/2023  Problem List  Principal Problem:    Fall  Active Problems:    Diabetic peripheral neuropathy ([address])    DM (diabetes mellitus) ([address])    Hypercholesterolemia    Urinary retention    Chronic bilateral low back pain with sciatica    Anxiety and depression    Hypertension    Hypoxia    Past Medical History  Past Medical History:   Diagnosis Date    Anxiety     Arthritis     Cancer ([address])     Depression     Diabetes mellitus ([address])     Hypertension     Liver disease     Mitral valve prolapse     Peripheral neuropathy     Neuropathy    PONV (postoperative nausea and vomiting)     Thyroid disease      Past Surgical History  Past Surgical History:   Procedure Laterality Date    COLONOSCOPY      INCISION AND DRAINAGE OF WOUND Left 08/12/2022    Procedure: INCISION AND DRAINAGE (I&D) EXTREMITY;  Surgeon: Meredith Mohs, DPM;  Location:  MAIN OR;  Service: Podiatry    JOINT REPLACEMENT Left 03/11/2022    Left TSA    MT ARTHRODESIS POSTERIOR/PSTLAT TQ 1NTRSPC LUMBAR Bilateral 04/11/2023    Procedure: L1-S1 navigated posterior decompression with instrumented fixation fusion;  Surgeon: Lawrence Dupree MD;  Location:  MAIN OR;  Service: Neurosurgery    THYROID SURGERY  2021    remove cancer             10/10/23 1129   OT Last Visit   OT Visit Date 10/10/23   Note Type   Note type Evaluation   Pain Assessment   Pain Assessment Tool 0-10   Pain Score 10 - Worst Possible Pain   Pain Location/Orientation Orientation: Lower; Location: Back   Restrictions/Precautions   Other Precautions Pain; Fall Risk;Cognitive; Chair Alarm   Home Living   Type of [address] [place] [address] Dr One level  (1 CECILIO)   [address] [place] [address] Walker;Reacher;Sock aid;Long-handled shoehorn   Prior Function   Level of [place] Needs assistance with ADLs; Independent with functional mobility; Needs assistance with IADLS   Lives With Spouse   Receives Help From Family   IADLs Family/Friend/Other provides transportation; Family/Friend/Other provides meals; Family/Friend/Other provides medication management   Falls in the last 6 months 1 to 4   Comments Spouse assists with LB dressing, pt does not like use of LHAD per wife. Pt is home alone during day   General   Additional Pertinent History Spinal sx 4/2023   Family/Caregiver Present Yes   Additional General Comments Spouse   Subjective   Subjective "It hurts"   ADL   Eating Assistance 7  Independent   Grooming Assistance 7  Independent   UB Bathing Assistance 6  Modified Independent   LB Bathing Assistance 3  Moderate Assistance   UB Dressing Assistance 7  [address] 3  Moderate [address] Deficit   (pt independently toileting upon entrance)   Bed Mobility   Supine to Sit Unable to assess   Additional Comments Received in bathroom   Transfers   Stand to Sit 5  Supervision   Additional items Armrests; Verbal cues   Toilet transfer 7  Independent   Additional items Standard toilet   Functional Mobility   Functional Mobility 5  Supervision   Additional Comments Greater than functional household distance. Pt required constant verbal cues for RW management as pt consistently pushes RW out of BRANDT & his posture becomes kyphotic. Pt would verbalize understanding immediately after cue & physically correct his posture, however, would quickly return to original posture.    Additional items Rolling walker   Balance   Static Sitting Normal   Dynamic Sitting Good   Static Standing Fair +   Dynamic Standing Fair +   Ambulatory Fair +   Activity Tolerance   Activity Tolerance Patient limited by pain   Medical Staff Made Aware PT Gia   Nurse Made Aware MIGDALIA Figueroa   RUE Assessment   RUE Assessment WFL   LUE Assessment   LUE Assessment WFL   Cognition   Overall Cognitive Status WFL   Arousal/Participation Alert   Attention Attends with cues to redirect   Orientation Level Oriented X4   Memory Decreased recall of precautions   Following Commands Follows one step commands without difficulty   Comments Poor carryover of education, mildly impulsive   Assessment   Limitation Decreased ADL status; Decreased Safe judgement during ADL;Decreased self-care trans;Decreased high-level ADLs   Prognosis Good   Assessment Pt is a 79 y.o. male seen for OT evaluation at [facility], admitted 10/9/2023 w/ Fall. OT completed extensive review of pt's medical and social history. Comorbidities affecting pt's functional performance at time of assessment include: diabetic peripheral neuropathy, DM, hypercholesterolemia, urinary retention, chronic LBP with sciatica, anxiety & depression, hypertension, hypoxia, h/o alcoholism, chronic pain syndrome, liver disease, medical marijuana use, anemia, hyponatremia, s/p lumbar fusion 4/2023. Personal factors affecting pt at time of IE include: steps to enter environment, limited home support, difficulty performing ADLS, difficulty performing IADLS , compliance, decreased initiation and engagement , and environment. Prior to admission, pt was living with spouse in a [facility]. Pt was A w/  ADLS and w/ IADLS, (-) drove, & required use of RW PTA. Upon evaluation: Pt requires S for functional mobility/transfers, independent for UB ADLs and Mod A for LB ADLS 2* the following deficits impacting occupational performance: weakness, decreased strength, decreased balance, decreased tolerance, decreased safety awareness, increased pain, and decreased coping skills. Full objective findings from OT assessment regarding body systems outlined above. Pt to benefit from continued skilled OT tx while in the hospital to address deficits as defined above and maximize level of functional independence w/ ADL's and functional mobility.  Occupational Performance areas to address include: bathing/shower, toilet hygiene, dressing, functional mobility, community mobility, and clothing management. Based on findings, pt is of high complexity. The patient's raw score on the -PAC Daily Activity inpatient short form is 21, standardized score is 44.27, greater than 39.4. Patients at this level are likely to benefit from DC to home. However, please refer to therapist recommendation for discharge planning given other factors that may influence destination. At this time, OT recommendations at time of discharge are DC with Level III resources. Goals   Patient Goals Pt wants to have less pain   Plan   Treatment Interventions Functional transfer training;ADL retraining;Patient/family training; Endurance training; Compensatory technique education;Continued evaluation;Equipment evaluation/education   Goal Expiration Date 10/20/23   OT Treatment Day 0   OT Frequency 1-2x/wk   Discharge Recommendation   UB Rehab Resource Intensity Level III (Minimum Resource Intensity)   -PAC Daily Activity Inpatient   Lower Body Dressing 2   Bathing 3   Toileting 4   Upper Body Dressing 4   Grooming 4   Eating 4   Daily Activity Raw Score 21   Daily Activity Standardized Score (Calc for Raw Score >=11) 44.27   -PAC Applied Cognition Inpatient   Following a Speech/Presentation 4   Understanding Ordinary Conversation 4   Taking Medications 4   Remembering Where Things Are Placed or Put Away 4   Remembering List of 4-5 Errands 4   Taking Care of Complicated Tasks 3   Applied Cognition Raw Score 23   Applied Cognition Standardized Score 53.08   End of Consult   Education Provided Yes;Family or social support of family present for education by provider   Patient Position at End of Consult Bedside chair;Bed/Chair alarm activated; All needs within reach   Nurse Communication Nurse aware of consult     Pt will achieve the following goals within 10 days. *Pt will complete LB bathing and dressing with Mod I & DME PRN.     *Pt will perform functional transfers with on/off all surfaces with Mod I using DME as needed w/ G balance/safety. *Pt will independently identify 3-5 fall risks during ADL routine to ensure home safety upon discharge. *Pt will improve functional mobility during ADL/IADL/leisure tasks to Mod I using DME as needed w/ G balance/safety.       *Pt will demonstrate G carryover of pt/caregiver education and training as appropriate w/ Mod I w/o cues w/ good tolerance      *Assess DME needs    Adrien Ganser, OTR/L

## 2023-10-10 NOTE — NURSING NOTE
2200 Attempted to give scheduled 10 units lantus. Patient states he 24 units in the morning and took it prior to coming to hospital.  Patient states dosage has been changed and that his wife gives him his medications. This RN spoke to wife Puja Ulrich via phone, regarding above. Per Puja Ulrich, patient "only takes the Lantus pen."  When this RN asked if patient took the 24 units of Lantus in the morning prior to coming to the ED, patient's wife stated "he has not taken any insulin since June."  Discussed with Benson Hirsch PA-C, okay to hold this dose as blood sugar 136.

## 2023-10-10 NOTE — ASSESSMENT & PLAN NOTE
Patient was noted to have oxygen saturation in the 70s while he was sleeping. Saturating fine at room air . Also patient was snoring. Concerning for MANUEL.   Oxygen on nasal cannula  Will need sleep study as outpatient

## 2023-10-10 NOTE — UTILIZATION REVIEW
NOTIFICATION OF OBSERVATION ADMISSION   AUTHORIZATION REQUEST   SERVICING FACILITY:   [address] [place] [address]  [address] [facility]Third [address] [address]  Tax ID: [id number]  NPI: [id number] ATTENDING PROVIDER:  Attending Name and NPI#: Rogelio Flores Md [[id number]]  Address: [address] [facility] [address]  Phone: [phone]   ADMISSION INFORMATION:  Place of Service: On [facility] Code: 22 CPT Code:   Admitting Diagnosis Code/Description:  Injury Venita Stone  Fall, initial encounter [W19. XXXA]  Laceration of scalp, initial encounter [S01.01XA]  Abnormal CT scan, lumbar spine [R93.7]  Ambulatory dysfunction [R26.2]  Observation Admission Date/Time: 10/09/2023 13:52pm  Discharge Date/Time: No discharge date for patient encounter. UTILIZATION REVIEW CONTACT:  Iraj Cardenas Utilization   Network Utilization Review Department  Phone: [phone]  Fax [phone]  Email: [email or web address]  Contact for approvals/pending authorizations, clinical reviews, and discharge. PHYSICIAN ADVISORY SERVICES:  Medical Necessity Denial & [facility]-cp-[facility] Review  Phone: [phone]  Fax: [phone]  Email: [email or web address]     DISCHARGE SUPPORT TEAM:  For Patients Discharge Needs & Updates  Phone: [phone] opt. 2 Fax: [phone]  Email: [email or web address]

## 2023-10-10 NOTE — ASSESSMENT & PLAN NOTE
Lab Results   Component Value Date    HGBA1C 6.3 (H) 03/14/2023       Recent Labs     10/09/23  2056 10/10/23  0733 10/10/23  1106 10/10/23  1616   POCGLU 136 158* 227* 137       Blood Sugar Average: Last 72 hrs:  (P) 164.5On Jardiance as outpatient. Also because of poor oral intake patient was not taking his 30 units of Lantus in the morning, since June Hold oral medication.   Continue ssi only   Accu-Chek  Diabetic diet

## 2023-10-10 NOTE — PLAN OF CARE
Problem: PHYSICAL THERAPY ADULT  Goal: Performs mobility at highest level of function for planned discharge setting. See evaluation for individualized goals. Description: Treatment/Interventions: Functional transfer training, LE strengthening/ROM, Elevations, Therapeutic exercise, Endurance training, Patient/family training, Equipment eval/education, Bed mobility, Gait training, Spoke to nursing, OT          See flowsheet documentation for full assessment, interventions and recommendations. Note: Prognosis: Guarded  Problem List: Decreased strength, Decreased endurance, Impaired balance, Decreased mobility, Impaired judgement, Decreased safety awareness, Impaired sensation, Pain  Assessment: Patient is a 67y/o M who presented after multiple falls. Neurosurgery currently signed off and are not recommending further surgery. Current medical status includes pain, spinal precautions, fall risk, bed/chair alarm, decreased sensation, decreased strength, balance, endurance and mobility. Patient was received in bathroom. He performed all mobility at a supervision level. He needed education on proper use of RW as he ambulates with it too far ahead leading to a very flexed posture. With proper use of RW, patients gait is steady. Patient will continue to benefit from ongoing inpatient P.T. Recommending level 3 resources. Low intensity The patient's AM-PAC Basic Mobility Inpatient Short Form Raw Score is 18. A Raw score of greater than 17 suggests the patient may benefit from discharge to home. Please also refer to the recommendation of the Physical Therapist for safe discharge planning. Barriers to Discharge: None          See flowsheet documentation for full assessment.

## 2023-10-10 NOTE — PLAN OF CARE
Problem: OCCUPATIONAL THERAPY ADULT  Goal: Performs self-care activities at highest level of function for planned discharge setting. See evaluation for individualized goals. Description: Treatment Interventions: Functional transfer training, ADL retraining, Patient/family training, Endurance training, Compensatory technique education, Continued evaluation, Equipment evaluation/education          See flowsheet documentation for full assessment, interventions and recommendations. Note: Limitation: Decreased ADL status, Decreased Safe judgement during ADL, Decreased self-care trans, Decreased high-level ADLs  Prognosis: Good  Assessment: Pt is a 79 y.o. male seen for OT evaluation at [address] [place] [address], admitted 10/9/2023 w/ Fall. OT completed extensive review of pt's medical and social history. Comorbidities affecting pt's functional performance at time of assessment include: diabetic peripheral neuropathy, DM, hypercholesterolemia, urinary retention, chronic LBP with sciatica, anxiety & depression, hypertension, hypoxia, h/o alcoholism, chronic pain syndrome, liver disease, medical marijuana use, anemia, hyponatremia, s/p lumbar fusion 4/2023. Personal factors affecting pt at time of IE include: steps to enter environment, limited home support, difficulty performing ADLS, difficulty performing IADLS , compliance, decreased initiation and engagement , and environment. Prior to admission, pt was living with spouse in a [facility]. Pt was A w/  ADLS and w/ IADLS, (-) drove, & required use of RW PTA. Upon evaluation: Pt requires S for functional mobility/transfers, independent for UB ADLs and Mod A for LB ADLS 2* the following deficits impacting occupational performance: weakness, decreased strength, decreased balance, decreased tolerance, decreased safety awareness, increased pain, and decreased coping skills. Full objective findings from OT assessment regarding body systems outlined above.  Pt to benefit from continued skilled OT tx while in the hospital to address deficits as defined above and maximize level of functional independence w/ ADL's and functional mobility. Occupational Performance areas to address include: bathing/shower, toilet hygiene, dressing, functional mobility, community mobility, and clothing management. Based on findings, pt is of high complexity. The patient's raw score on the AM-PAC Daily Activity inpatient short form is 21, standardized score is 44.27, greater than 39.4. Patients at this level are likely to benefit from DC to home. However, please refer to therapist recommendation for discharge planning given other factors that may influence destination. At this time, OT recommendations at time of discharge are DC with Level III resources.

## 2023-10-10 NOTE — TELEPHONE ENCOUNTER
10/12/2023-CALLED PT, LEFT MESSAGE ON MACHINE CONFIRMING 12/20/2023 APT IN [place] AND TO COMPLETE XRAY PRIOR TO APT.       10/10/2023-PT [address] 12/20/2023 SNPX W/LENKA & ISAÍAS W/LUMBAR SPINE XRAY

## 2023-10-10 NOTE — ASSESSMENT & PLAN NOTE
Lab Results   Component Value Date    HGBA1C 6.3 (H) 03/14/2023       Recent Labs     10/09/23  2056 10/10/23  0733 10/10/23  1106 10/10/23  1616   POCGLU 136 158* 227* 137       Blood Sugar Average: Last 72 hrs:  (P) 164.5Chronic, currently on gabapentin.   Had side effects on Lyrica  Will start gabapentin at bedtime only

## 2023-10-10 NOTE — PHYSICAL THERAPY NOTE
PHYSICAL THERAPY EVAL  Physical Therapy Evaluation    Performed at least 2 patient identifiers during session:  Patient Active Problem List   Diagnosis    Alcoholism in remission ([address])    Benzodiazepine dependence ([address])    Bilateral adhesive capsulitis of shoulders    Bronchitis, mucopurulent recurrent ([address])    Cirrhosis, alcoholic ([address])    Diabetic peripheral neuropathy ([address])    DM (diabetes mellitus) ([address])    Herniated nucleus pulposus of lumbosacral region    Hypercholesterolemia    Lumbar spondylosis    Thyroid cancer ([address])    Liver disease    Chronic pain syndrome    Chronic bilateral low back pain without sciatica    PONV (postoperative nausea and vomiting)    Medical marijuana use    Urinary retention    Anemia    Hyponatremia    Gallstones    Status post lumbar spinal fusion    Benign prostatic hyperplasia with urinary retention    Scrotal pain    Lower urinary tract symptoms    Fall    Chronic bilateral low back pain with sciatica    Anxiety and depression    Hypertension    Hypoxia       Past Medical History:   Diagnosis Date    Anxiety     Arthritis     Cancer ([address])     Depression     Diabetes mellitus ([address])     Hypertension     Liver disease     Mitral valve prolapse     Peripheral neuropathy     Neuropathy    PONV (postoperative nausea and vomiting)     Thyroid disease        Past Surgical History:   Procedure Laterality Date    COLONOSCOPY      INCISION AND DRAINAGE OF WOUND Left 08/12/2022    Procedure: INCISION AND DRAINAGE (I&D) EXTREMITY;  Surgeon: Edwige Todd DPM;  Location:  MAIN OR;  Service: Podiatry    JOINT REPLACEMENT Left 03/11/2022    Left TSA    WA ARTHRODESIS POSTERIOR/PSTLAT TQ 1NTRSPC LUMBAR Bilateral 04/11/2023    Procedure: L1-S1 navigated posterior decompression with instrumented fixation fusion;  Surgeon: Herlinda Joy MD;  Location:  MAIN OR;  Service: Neurosurgery    THYROID SURGERY  2021 remove cancer        10/10/23 1130   PT Last Visit   PT Visit Date 10/10/23   Note Type   Note type Evaluation   Pain Assessment   Pain Assessment Tool 0-10   Pain Score 10 - Worst Possible Pain   Pain Location/Orientation   (Low back, B/L LE's)   [facility] Pain Intervention(s) Medication (See MAR); Ambulation/increased activity   Restrictions/Precautions   Other Precautions Pain; Fall Risk;Cognitive; Chair Alarm; Bed Alarm   Home Living   Type of [facility]  One level  (1STE)   Home Equipment Walker   Prior Function   Level of [place] Independent with functional mobility; Needs assistance with ADLs; Needs assistance with IADLS   Lives With Spouse   Receives Help From Family   IADLs Family/Friend/Other provides transportation; Family/Friend/Other provides meals; Family/Friend/Other provides medication management   Falls in the last 6 months 1 to 4   Comments Spouse assists with ADL's. Spouse works-patient is home alone at times   General   Additional Pertinent History Recent spinal surgery this past spring   Family/Caregiver Present Yes   Cognition   Overall Cognitive Status Impaired   Arousal/Participation Alert   Orientation Level Oriented X4   Memory Decreased recall of precautions   Following Commands Follows one step commands with increased time or repetition   Comments Poor carryover   Subjective   Subjective "I have sciatic pain."   Light Touch   RLE Light Touch Impaired   RLE Light Touch Comments Decreased sensation in foot   LLE Light Touch Impaired   LLE Light Touch Comments decreased sensation in foot   Bed Mobility   Additional Comments Received in bathroom on toilet. Transfers   Sit to Stand 5  Supervision   Additional items Armrests   Stand to Sit 5  Supervision   Additional items Armrests   Additional Comments Verbal cues for hand placement as patient pulls up on RW and does not reach back for chair.    Ambulation/Elevation   Gait pattern Forward Flexion;Decreased foot clearance;Narrow BRANDT;Antalgic   Gait Assistance 5  Supervision   Assistive Device Rolling walker   Distance 300ft   Ambulation/Elevation Additional Comments Patient tends to ambulate to far aware from RW which causes increased flexed posture. Repeated cues for proper use of RW. Balance   Static Sitting Normal   Dynamic Sitting Good   Static Standing Fair +   Dynamic Standing Fair +   Ambulatory Fair +   Endurance Deficit   Endurance Deficit Yes   Endurance Deficit Description Limited by pain   Activity Tolerance   Activity Tolerance Patient limited by pain   Medical Staff Made Aware Jessica WHEELER   Nurse Made Aware Carolina SULLIVAN   Assessment   Prognosis Guarded   Problem List Decreased strength;Decreased endurance; Impaired balance;Decreased mobility; Impaired judgement;Decreased safety awareness; Impaired sensation;Pain   Assessment Patient is a 67y/o M who presented after multiple falls. Neurosurgery currently signed off and are not recommending further surgery. Current medical status includes pain, spinal precautions, fall risk, bed/chair alarm, decreased sensation, decreased strength, balance, endurance and mobility. Patient was received in bathroom. He performed all mobility at a supervision level. He needed education on proper use of RW as he ambulates with it too far ahead leading to a very flexed posture. With proper use of RW, patients gait is steady. Patient will continue to benefit from ongoing inpatient P.T. Recommending level 3 resources. Low intensity The patient's AM-PAC Basic Mobility Inpatient Short Form Raw Score is 18. A Raw score of greater than 17 suggests the patient may benefit from discharge to home. Please also refer to the recommendation of the Physical Therapist for safe discharge planning. Barriers to Discharge None   Goals   Patient Goals To have less pain   STG Expiration Date 10/17/23   Short Term Goal #1 1.  Perform supine<>sit using log roll technique mod I level 2. perform sit<>stand transfers with use of UE's mod I level 3. Ambulate 500ft with a RW mod I level 4. Ascend/descend 1 step with RW mod I level   PT Treatment Day 0   Plan   Treatment/Interventions Functional transfer training;LE strengthening/ROM; Elevations; Therapeutic exercise; Endurance training;Patient/family training;Equipment eval/education; Bed mobility;Gait training;Spoke to nursing;OT   PT Frequency 3-5x/wk   Discharge Recommendation   UB Rehab Resource Intensity Level III (Minimum Resource Intensity)   AM-PAC Basic Mobility Inpatient   Turning in Flat Bed Without Bedrails 3   Lying on Back to Sitting on Edge of Flat Bed Without Bedrails 3   Moving Bed to Chair 3   Standing Up From Chair Using Arms 3   Walk in Room 3   Climb 3-5 Stairs With Railing 3   Basic Mobility Inpatient Raw Score 18   Basic Mobility Standardized Score 41.05   Highest Level Of Mobility   JH-HLM Goal 6: Walk 10 steps or more   JH-HLM Achieved 7: Walk 25 feet or more   End of Consult   Patient Position at End of Consult Bedside chair;Bed/Chair alarm activated; All needs within reach     Soha Page, PT           Patient Name: Hari BUENO'R Date: 10/10/2023

## 2023-10-10 NOTE — CASE MANAGEMENT
Case Management Discharge Planning Note    Patient name Yamileth Cano  Location /-73 MRN [id number]  : 1955 Date 10/10/2023       Current Admission Date: 10/9/2023  Current Admission Diagnosis:Fall   Patient Active Problem List    Diagnosis Date Noted    Fall 10/09/2023    Chronic bilateral low back pain with sciatica 10/09/2023    Anxiety and depression 10/09/2023    Hypertension 10/09/2023    Hypoxia 10/09/2023    Benign prostatic hyperplasia with urinary retention 2023    Scrotal pain 2023    Lower urinary tract symptoms 2023    Status post lumbar spinal fusion 2023    Hyponatremia 2023    Gallstones 2023    Anemia 2023    Urinary retention 2023    PONV (postoperative nausea and vomiting)     Medical marijuana use     Chronic bilateral low back pain without sciatica 2023    Chronic pain syndrome 2022    Liver disease 08/10/2022    Bronchitis, mucopurulent recurrent ([address]) 2022    Cirrhosis, alcoholic ([address])     Diabetic peripheral neuropathy ([address]) 2022    Herniated nucleus pulposus of lumbosacral region 2022    Thyroid cancer ([address]) 2021    Alcoholism in remission ([address]) 2021    Benzodiazepine dependence ([address]) 2021    Bilateral adhesive capsulitis of shoulders 2021    DM (diabetes mellitus) ([address]) 2021    Hypercholesterolemia 2021    Lumbar spondylosis 2021      LOS (days): 0  Geometric Mean LOS (GMLOS) (days):   Days to GMLOS:     OBJECTIVE:  Risk of Unplanned Readmission Score: 22.69         Current admission status: Inpatient   Preferred Pharmacy:   [facility].  [address]  Phone: [phone] Fax: [phone]    Primary Care Provider: Lisa Denise DO    Primary Insurance: [facility]  [place] [facility] [place]    DISCHARGE DETAILS: CM met with pt and spouse and gave them information on non-skilled nursing and referred them to call [address] and Adult about their Waiver and their caregiver programs. Pt and spouse agreeable to home health but do not want [facility] again. Referrals made in [place].

## 2023-10-10 NOTE — CASE MANAGEMENT
Case Management Assessment & Discharge Planning Note    Patient name Hari Guardian  Location /-45 MRN [id number]  : 1955 Date 10/10/2023       Current Admission Date: 10/9/2023  Current Admission Diagnosis:Fall   Patient Active Problem List    Diagnosis Date Noted    Fall 10/09/2023    Chronic bilateral low back pain with sciatica 10/09/2023    Anxiety and depression 10/09/2023    Hypertension 10/09/2023    Hypoxia 10/09/2023    Benign prostatic hyperplasia with urinary retention 2023    Scrotal pain 2023    Lower urinary tract symptoms 2023    Status post lumbar spinal fusion 2023    Hyponatremia 2023    Gallstones 2023    Anemia 2023    Urinary retention 2023    PONV (postoperative nausea and vomiting)     Medical marijuana use     Chronic bilateral low back pain without sciatica 2023    Chronic pain syndrome 2022    Liver disease 08/10/2022    Bronchitis, mucopurulent recurrent ([address]) 2022    Cirrhosis, alcoholic ([address])     Diabetic peripheral neuropathy ([address]) 2022    Herniated nucleus pulposus of lumbosacral region 2022    Thyroid cancer ([address]) 2021    Alcoholism in remission ([address]) 2021    Benzodiazepine dependence ([address]) 2021    Bilateral adhesive capsulitis of shoulders 2021    DM (diabetes mellitus) ([address]) 2021    Hypercholesterolemia 2021    Lumbar spondylosis 2021      LOS (days): 0  Geometric Mean LOS (GMLOS) (days):   Days to GMLOS:     OBJECTIVE:              Current admission status: Observation       Preferred Pharmacy:   [facility].  [address]  [address] [address]  Phone: [phone] Fax: [phone]    Primary Care Provider: Toro Fairchild DO    Primary Insurance: [facility]  Secondary Insurance: [facility]    ASSESSMENT:  [address] Jc Nguyen, [address] Representative - Spouse   Primary Phone: [phone] (Mobile)                 Advance Directives  Does patient have a [address]?: No  Was patient offered paperwork?: Yes (declined)  Does patient currently have a Health Care decision maker?: Yes, please see Health Care Proxy section  Does patient have Advance Directives?: No  Was patient offered paperwork?: Yes (declined)  Primary Contact: Deena Fabry, spouse         Readmission Root Cause  30 Day Readmission: No    Patient Information  Admitted from[de-identified] Home  Mental Status: Alert  During Assessment patient was accompanied by: Spouse  Assessment information provided by[de-identified] Patient  Primary Caregiver: Self  Support Systems: Spouse/significant other, Family members, [facility] of Residence: [address] do you live in?: [address] [facility] entry access options. Select all that apply.: Stairs  Number of steps to enter home. : 1  Do the steps have railings?: Yes  Type of Current Residence: [facility]  In the last 12 months, was there a time when you were not able to pay the mortgage or rent on time?: No  In the last 12 months, how many places have you lived?: 1  In the last 12 months, was there a time when you did not have a steady place to sleep or slept in a shelter (including now)?: No  Homeless/housing insecurity resource given?: N/A  Living Arrangements: Lives w/ Spouse/significant other    Activities of Daily Living Prior to Admission  Functional Status: Independent  Completes ADLs independently?: Yes  Ambulates independently?: Yes  Does patient use assisted devices?: Yes  Assisted Devices (DME) used: Straight Guyann Leonard, Bedside Commode, Rollator, Shower Chair  Does patient currently own DME?: Yes  What DME does the patient currently own?: Bedside Commode, Rollator, Shower Chair, Straight Cane, Walker  Does patient have a history of Outpatient Therapy (PT/OT)?: No  Does the patient have a history of Short-Term Rehab?: Yes ([facility])  Does patient have a history of HHC?: Yes ([facility])  Does patient currently have [facility]?: No         Patient Information Continued  Income Source: Pension/[employer]  Does patient have prescription coverage?: Yes  Within the past 12 months, you worried that your food would run out before you got the money to buy more.: Never true  Within the past 12 months, the food you bought just didn't last and you didn't have money to get more.: Never true  Food insecurity resource given?: N/A  Does patient receive dialysis treatments?: No  Does patient have a history of substance abuse?: Yes  Historical substance use preference: Alcohol/ETOH  Is patient currently in treatment for substance abuse?: N/A - sober  Does patient have a history of Mental Health Diagnosis?: Yes (Anxiety and depression)  Is patient receiving treatment for mental health?: Yes  Has patient received inpatient treatment related to mental health in the last 2 years?: No         Means of Transportation  Means of Transport to Appts[de-identified] Family transport  In the past 12 months, has lack of transportation kept you from medical appointments or from getting medications?: No  In the past 12 months, has lack of transportation kept you from meetings, work, or from getting things needed for daily living?: No  Was application for public transport provided?: N/A        DISCHARGE DETAILS:    Discharge planning discussed with[de-identified] Patient and his wife, Nubia Hdz of Choice: Yes     CM contacted family/caregiver?: Yes  Were Treatment Team discharge recommendations reviewed with patient/caregiver?: Yes  Did patient/caregiver verbalize understanding of patient care needs?: Yes  Were patient/caregiver advised of the risks associated with not following Treatment Team discharge recommendations?: Yes    Contacts  Patient Contacts: Nereyda Tamayo, wife  Relationship to Patient[de-identified] Family  Contact Method:  In Person  Reason/Outcome: Continuity of Care, Emergency Contact, Discharge [address]         Is the patient interested in [address] at discharge?: Yes  [facility] requested[de-identified] Physical Therapy, Occupational Kiel Reynoldslin Provider[de-identified] PCP  Home Health Services Needed[de-identified] Evaluate Functional Status and Safety, Gait/ADL Training, Strengthening/Theraputic Exercises to Improve Function, Restore Joint Function Post Joint Replacement  Homebound Criteria Met[de-identified] Uses an Assist Device (i.e. cane, walker, etc)  Supporting Clincal Findings[de-identified] Limited Endurance    DME Referral Provided  Referral made for DME?: No              Treatment Team Recommendation: Home with [address]  Discharge Destination Plan[de-identified] Home with [address] [facility] [address] at Discharge : Family                                      Additional Comments: Met with pt and spouse, Tiffanie Thomas to discuss role of CM and any needs prior to discharge. Pt lives w/ spouse in ranch home w/ 1ste. Pt was indp PTA. Pt uses spc, RW, rollator, shower chair, and BSC. [facility]. Pt has hx alcohol abuse but is sober now and hx anxiety and depression. No IP Psych stays. Pt prefers [facility] use [facility]. Wife will transport at discharge.

## 2023-10-10 NOTE — ASSESSMENT & PLAN NOTE
Per patient's wife, patient fell 3 times yesterday  Wife reported patient has ambulatory dysfunction since the back surgery a few months ago. She reported because of the pain his legs are giving up also he rested in bed most of the time since the back surgery. Suspect this is multifactorial: Back pain, deconditioning, and medication side effect  Patient's wife reported patient is drowsy after gabapentin. Decrease gabapentin to 300 mg to HS only, discontinue hydroxyzine. fall precaution.   PT/OT- recommended level 3   Continue pain management

## 2023-10-10 NOTE — ASSESSMENT & PLAN NOTE
Dx: Lumbar Instability         Authorized # of Visits:  PPO (12 Visits)         Next MD visit: none scheduled  Fall Risk: standard         Precautions: n/a      Last MD Note: 07/28/2021     Goal Number: 12     Subjective: Feeling better.   No adverse effect Patient's status post T12-S1 fusion with bilateral laminectomy and facetectomy in April 2023. Patient reported continuous of back pain since, radiating bilateral legs. Per wife and patient, the pain was disabling, patient mostly resting in bed also with decreased oral intake. Was seen as outpatient by acute pain management and was started on gabapentin that was slowly titrated to 400 mg 4 times a day. Per wife, patient somnolent after gabapentin. Was on steroids before, not currently  Also on medical marijuana  Not on opiates  Because of fall, patient had CT lumbar that showed development of lucency S1 around bilateral screws discerning for loosening. No bone destruction but correlation with infection, clinically   neurosurgery was consulted, recommended upright x-ray lumbar spine. Per neurosurgery, there is a hardware loosening, but there is no movement noted on the XR with bending and there is no spinal instability. No surgery indicated   Patient has milld leukocytosis that might be reactive, has normal procalcitonin. No concern of infection at this point.   Pain management  PT/OT trapezius stretch; Thoracic PA manipulation. Assessment: Responded well.  strength is good overall. Unable to reproduce the arm symptoms. Goals (12 visits):    1. Increase FOTO assessment > 11% from INE to DC.   2. Patient will be aw

## 2023-10-10 NOTE — UTILIZATION REVIEW
Initial Clinical Review  Date: 10/11/23   Day 3: Has surpassed a 2nd midnight with active treatments and services, which include pain control with use of IV analgesia, PT/OT. Ongoing labs, iron panel. Accuchecks with SSI. Neuro surgery on consult. Admission: Date/Time/Statement: 10/9/23 1352 Observation AND CHANGED 10/10/23 1552 INPATIENT RE: PATIENT NEEDS > 2 MIDNIGHT STAY DUE TO CONTINUED NEED FOR PAIN CONTROL AND PT IN SETTING OF CHRONIC PAIN WITH INCREASED RECENT FALLS WITH HISTORY OF LUMBAR SURGERY. Admission Orders (From admission, onward)       Ordered        10/10/23 1552  Inpatient Admission  Once                          Orders Placed This Encounter   Procedures    Inpatient Admission     Standing Status:   Standing     Number of Occurrences:   1     Order Specific Question:   Level of Care     Answer:   Med Surg [16]     Order Specific Question:   Estimated length of stay     Answer:   More than 2 Midnights     Order Specific Question:   Certification     Answer:   I certify that inpatient services are medically necessary for this patient for a duration of greater than two midnights. See H&P and MD Progress Notes for additional information about the patient's course of treatment. ED Arrival Information       Expected   -    Arrival   10/9/2023 10:23    Acuity   Emergent              Means of arrival   Ambulance    Escorted by   [facility])    Service   Hospitalist    Admission type   Emergency              Arrival complaint   fall             Chief Complaint   Patient presents with    Fall       Initial Presentation: 79 y.o. male from home to ED via ems admitted to observation [address] due to  Fall/Hypoxia/Chronic low back pain with sciatica. Presented due to fall after sudden pain while sitting on toilet causing fall to left side, striking head on tile floor about 3 hours prior to arrival.  Back pain worse since fall. On asa. For last 3 days frequent falls.   + chills. Self caths. On exam 2 cm laceration posterior head. Slight decrease in sensation between left 1st and 2nd toes. Sensation otherwise intact. 5/5 dorsiflexion and plantar flexion. Glucose 174. Wbc 12.33.  H&H 8.6/31.4. Ct cervical spine showed degenerative changes. Ct lumbar spine showed chronic findings and Development of lucency around the S1 bilateral screws up to 6 mm, concerning for loosening. In the ED scalp laceration repaired. In the ED given Lidoderm Patch, Toradol and tylenol. Sats to 70s with sleeping. Plan is PT/OT, pain management:  discontinue gabapentin and hydroxyzine. decrease Klonopin to 1 mg a day  Oxygen as needed. Will need OP sleep study. Continue self cath. Hold Jardiance. Start Lantus at bedtime, add SSI. Consult neuro surgery and acute pain service. 10/9/23 per neurosurgery - patient has multiple falls and increased back pain. History of T12-S1 posterior instrumented fixation fusion, L1-2 and L4-5 bilateral laminectomies and total facetectomies, L2-3 and L3-4 bilateral laminectomies and medial facetectomies, L5-S1 bilateral laminal foraminotomies on 4/11/2023. Does not feel improved since surgery. Per imaging concern for hardware loosening most notable at bilateral SI screws. Plan is upright XR lumbar spine with flexion and extension views to assess stability of hardware. Consider infectious disease work up. Date: 10/10/23    Day 2 CHANGED TO INPATIENT:  Has low back pain, rates 10/10. Has decreased sensation in bilateral feet. On exam gait antalgic. Decreased strength and endurance. Continue pain control: On scheduled tylenol. Lidoderm patch. As needed Robaxin. As needed oxycodone and for breakthrough Dilaudid and using both    Per neurosurgery - hardware is intact. No surgical intervention.   Continue with multimodal pain regimen, PT    ED Triage Vitals   Temperature Pulse Respirations Blood Pressure SpO2   10/09/23 1030 10/09/23 1030 10/09/23 1030 10/09/23 1030 10/09/23 1030   98.4 °F (36.9 °C) 73 16 (!) 199/94 99 %      Temp Source Heart Rate Source Patient Position - Orthostatic VS BP Location FiO2 (%)   10/09/23 1030 10/09/23 1030 10/10/23 0733 10/09/23 1510 --   Oral Monitor Sitting Right arm       Pain Score       10/09/23 1129       8          Wt Readings from Last 1 Encounters:   10/09/23 81.2 kg (179 lb 0.2 oz)     Additional Vital Signs:   10/10/23 07:33:46 97 °F (36.1 °C) Abnormal  78 20 161/86 111 99 % None (Room air) Sitting   10/09/23 21:02:04 97 °F (36.1 °C) Abnormal  69 16 150/72 98 98 % None (Room air) --   10/09/23 1510 98.2 °F (36.8 °C) 68 18 163/71 --        Pertinent Labs/Diagnostic Test Results:   XR spine lumbar minimum 4 views non injury   Final Result by Antelmo Dumont MD (10/09 1527)      The orthopedic hardware is intact. No significant spondylolisthesis with flexion or extension. Irregular endplate at K6-T1. Workstation performed: [id number]         TRAUMA - CT head wo contrast   Final Result by Kathy Hart MD (10/09 1125)      No acute intracranial abnormality. Diffuse atrophic changes. Workstation performed: [id number]         TRAUMA - CT spine cervical wo contrast   Final Result by Kathy Hart MD (10/09 1136)      No cervical spine fracture or traumatic malalignment. Advanced multilevel degenerative changes. Workstation performed: [id number]         TRAUMA - CT spine lumbar wo contrast   Final Result by Kathy Hart MD (10/09 1147)   No evidence of fracture or traumatic malalignment. Chronic findings, as per the body of the report. Development of lucency around the S1 bilateral screws up to 6 mm, concerning for loosening. No destructive changes in the bone but correlate clinically to exclude symptoms of infection.             Workstation performed: [id number]         XR Trauma chest portable   Final Result by Dominique Vergara MD (10/09 1047) No acute cardiopulmonary disease.       Findings are stable            Workstation performed: [id number]             Results from last 7 days   Lab Units 10/11/23  0329 10/10/23  0352 10/09/23  1039   WBC Thousand/uL 9.78 9.81 12.33*   HEMOGLOBIN g/dL 8.6* 8.4* 8.6*   HEMATOCRIT % 30.3* 29.3* 31.4*   PLATELETS Thousands/uL 515* 482* 501*   NEUTROS ABS Thousands/µL  --   --  8.10*     Results from last 7 days   Lab Units 10/11/23  0329 10/10/23  0352 10/09/23  1039   SODIUM mmol/L 135 135 137   POTASSIUM mmol/L 3.8 3.8 4.3   CHLORIDE mmol/L 99 100 100   CO2 mmol/L 28 28 30   ANION GAP mmol/L 8 7 7   BUN mg/dL 13 16 20   CREATININE mg/dL 0.81 0.76 0.95   EGFR ml/min/1.73sq m 92 95 83   CALCIUM mg/dL 9.3 9.1 9.2     Results from last 7 days   Lab Units 10/09/23  1039   AST U/L 21   ALT U/L 12   ALK PHOS U/L 75   TOTAL PROTEIN g/dL 7.5   ALBUMIN g/dL 4.4   TOTAL BILIRUBIN mg/dL 0.32     Results from last 7 days   Lab Units 10/11/23  0731 10/10/23  2034 10/10/23  1616 10/10/23  1106 10/10/23  0733 10/09/23  2056   POC GLUCOSE mg/dl 249* 192* 137 227* 158* 136     Results from last 7 days   Lab Units 10/11/23  0329 10/10/23  0352 10/09/23  1039   GLUCOSE RANDOM mg/dL 154* 106 174*     Results from last 7 days   Lab Units 10/09/23  1208   PROTIME seconds 13.2   INR  0.96   PTT seconds 27     Results from last 7 days   Lab Units 10/09/23  1208   PROCALCITONIN ng/ml 0.09     Results from last 7 days   Lab Units 10/09/23  1208   LACTIC ACID mmol/L 2.0     Results from last 7 days   Lab Units 10/09/23  1757   CLARITY UA  Slightly Cloudy   COLOR UA  Yellow   SPEC GRAV UA  1.010   PH UA  6.0   GLUCOSE UA mg/dl 1000 (1%)*   KETONES UA mg/dl Negative   BLOOD UA  Negative   PROTEIN UA mg/dl Negative   NITRITE UA  Positive*   BILIRUBIN UA  Negative   UROBILINOGEN UA (BE) mg/dl <2.0   LEUKOCYTES UA  Large*   WBC UA /hpf 20-30*   RBC UA /hpf None Seen   BACTERIA UA /hpf Innumerable*   EPITHELIAL CELLS WET PREP /hpf Occasional Results from last 7 days   Lab Units 10/09/23  1757   AMPH/METH  Negative   BARBITURATE UR  Negative   BENZODIAZEPINE UR  Negative   COCAINE UR  Negative   OPIATE UR  Negative   PCP UR  Negative   THC UR  Positive*     Results from last 7 days   Lab Units 10/09/23  1214 10/09/23  1208   BLOOD CULTURE  No Growth at 24 hrs. No Growth at 24 hrs. ED Treatment:   Medication Administration from 10/09/2023 1023 to 10/09/2023 1438         Date/Time Order Dose Route Action Comments     10/09/2023 1040 EDT lidocaine (LIDODERM) 5 % patch 1 patch 1 patch Topical Medication Applied --     10/09/2023 1132 EDT ketorolac (TORADOL) injection 15 mg 15 mg Intravenous Given --     10/09/2023 1129 EDT acetaminophen (TYLENOL) tablet 975 mg 975 mg Oral Given --     10/09/2023 1207 EDT lidocaine-epinephrine (XYLOCAINE/EPINEPHRINE) 1 %-1:100,000 injection 5 mL 5 mL Infiltration Given by Other --          Past Medical History:   Diagnosis Date    Anxiety     Arthritis     Cancer ([address])     Depression     Diabetes mellitus ([address])     Hypertension     Liver disease     Mitral valve prolapse     Peripheral neuropathy     Neuropathy    PONV (postoperative nausea and vomiting)     Thyroid disease      Present on Admission:   Diabetic peripheral neuropathy ([address])   DM (diabetes mellitus) ([address])   Hypercholesterolemia   Urinary retention      [facility]  Fall, initial encounter [W19. XXXA]  Laceration of scalp, initial encounter [S01.01XA]  Abnormal CT scan, lumbar spine [R93.7]  Ambulatory dysfunction [R26.2]  Age/Sex: 79 y.o. male  Admission Orders:  Scheduled Medications:  acetaminophen, 975 mg, Oral, Q8H  aspirin, 81 mg, Oral, Daily  clonazePAM, 1 mg, Oral, Daily  enoxaparin, 40 mg, Subcutaneous, Daily  finasteride, 5 mg, Oral, Daily  folic acid, 5,023 mcg, Oral, Daily  losartan, 100 mg, Oral, QAM   And  hydrochlorothiazide, 25 mg, Oral, QAM  insulin glargine, 10 Units, Subcutaneous, HS  insulin lispro, 1-5 Units, Subcutaneous, HS  insulin lispro, 1-6 Units, Subcutaneous, TID AC  metoprolol succinate, 75 mg, Oral, QAM  mirtazapine, 45 mg, Oral, HS  NIFEdipine, 120 mg, Oral, QAM  pravastatin, 20 mg, Oral, Daily With Dinner  senna-docusate sodium, 1 tablet, Oral, HS  sertraline, 100 mg, Oral, QAM  tamsulosin, 0.4 mg, Oral, Daily With Dinner      Continuous IV Infusions:     PRN Meds:  fluticasone, 1 spray, Nasal, BID PRN  HYDROmorphone, 0.2 mg, Intravenous, Q4H PRN x 3 10/10/23 . X 1 10/11/23   methocarbamol, 750 mg, Oral, Q6H PRN x 1 10/10/23   oxyCODONE, 2.5 mg, Oral, Q4H PRN x 1 10/9, x 1 10/10/23 . X 2 10/11/23   polyethylene glycol, 17 g, Oral, Daily PRN    clonazePAM (KlonoPIN) tablet 0.5 mg x 2 10/10/23   Dose: 0.5 mg  Freq: 2 times daily PRN Route: PO  PRN Reason: anxiety  Start: 10/10/23 1553     PT/OT    IP CONSULT TO NEUROSURGERY    Network Utilization Review Department  ATTENTION: Please call with any questions or concerns to [phone] and carefully listen to the prompts so that you are directed to the right person. All voicemails are confidential.   For Discharge needs, contact Care Management DC Support Team at [phone] opt. 2  Send all requests for admission clinical reviews, approved or denied determinations and any other requests to dedicated fax number below belonging to the campus where the patient is receiving treatment.  List of dedicated fax numbers for the Facilities:  [place] DENIALS (Administrative/Medical Necessity) [phone]   DISCHARGE SUPPORT TEAM (NETWORK) [phone] [facility] [phone]   [address] [facility] 1000 [facility] [phone] [address] [place] [address] [phone] 1150 [facility]. [address] [facility] [address] [facility] [address]  [facility] [phone]

## 2023-10-11 VITALS
OXYGEN SATURATION: 98 % | WEIGHT: 179.01 LBS | HEART RATE: 98 BPM | HEIGHT: 74 IN | TEMPERATURE: 96.8 F | BODY MASS INDEX: 22.97 KG/M2 | RESPIRATION RATE: 18 BRPM | SYSTOLIC BLOOD PRESSURE: 176 MMHG | DIASTOLIC BLOOD PRESSURE: 88 MMHG

## 2023-10-11 PROBLEM — R09.02 HYPOXIA: Status: RESOLVED | Noted: 2023-10-09 | Resolved: 2023-10-11

## 2023-10-11 LAB
ANION GAP SERPL CALCULATED.3IONS-SCNC: 8 MMOL/L
BUN SERPL-MCNC: 13 MG/DL (ref 5–25)
CALCIUM SERPL-MCNC: 9.3 MG/DL (ref 8.4–10.2)
CHLORIDE SERPL-SCNC: 99 MMOL/L (ref 96–108)
CO2 SERPL-SCNC: 28 MMOL/L (ref 21–32)
CREAT SERPL-MCNC: 0.81 MG/DL (ref 0.6–1.3)
ERYTHROCYTE [DISTWIDTH] IN BLOOD BY AUTOMATED COUNT: 19.6 % (ref 11.6–15.1)
FERRITIN SERPL-MCNC: 6 NG/ML (ref 24–336)
GFR SERPL CREATININE-BSD FRML MDRD: 92 ML/MIN/1.73SQ M
GLUCOSE SERPL-MCNC: 154 MG/DL (ref 65–140)
GLUCOSE SERPL-MCNC: 219 MG/DL (ref 65–140)
GLUCOSE SERPL-MCNC: 249 MG/DL (ref 65–140)
GLUCOSE SERPL-MCNC: 254 MG/DL (ref 65–140)
GLUCOSE SERPL-MCNC: 276 MG/DL (ref 65–140)
HCT VFR BLD AUTO: 30.3 % (ref 36.5–49.3)
HGB BLD-MCNC: 8.6 G/DL (ref 12–17)
IRON SATN MFR SERPL: 4 % (ref 15–50)
IRON SERPL-MCNC: 14 UG/DL (ref 50–212)
MCH RBC QN AUTO: 20.3 PG (ref 26.8–34.3)
MCHC RBC AUTO-ENTMCNC: 28.4 G/DL (ref 31.4–37.4)
MCV RBC AUTO: 72 FL (ref 82–98)
PLATELET # BLD AUTO: 515 THOUSANDS/UL (ref 149–390)
PMV BLD AUTO: 10.4 FL (ref 8.9–12.7)
POTASSIUM SERPL-SCNC: 3.8 MMOL/L (ref 3.5–5.3)
RBC # BLD AUTO: 4.24 MILLION/UL (ref 3.88–5.62)
SODIUM SERPL-SCNC: 135 MMOL/L (ref 135–147)
TIBC SERPL-MCNC: 371 UG/DL (ref 250–450)
UIBC SERPL-MCNC: 357 UG/DL (ref 155–355)
WBC # BLD AUTO: 9.78 THOUSAND/UL (ref 4.31–10.16)

## 2023-10-11 PROCEDURE — 82948 REAGENT STRIP/BLOOD GLUCOSE: CPT

## 2023-10-11 PROCEDURE — 83540 ASSAY OF IRON: CPT | Performed by: INTERNAL MEDICINE

## 2023-10-11 PROCEDURE — 83550 IRON BINDING TEST: CPT | Performed by: INTERNAL MEDICINE

## 2023-10-11 PROCEDURE — G0426 INPT/ED TELECONSULT50: HCPCS | Performed by: STUDENT IN AN ORGANIZED HEALTH CARE EDUCATION/TRAINING PROGRAM

## 2023-10-11 PROCEDURE — 85027 COMPLETE CBC AUTOMATED: CPT | Performed by: INTERNAL MEDICINE

## 2023-10-11 PROCEDURE — 82728 ASSAY OF FERRITIN: CPT | Performed by: INTERNAL MEDICINE

## 2023-10-11 PROCEDURE — 99239 HOSP IP/OBS DSCHRG MGMT >30: CPT | Performed by: INTERNAL MEDICINE

## 2023-10-11 PROCEDURE — 80048 BASIC METABOLIC PNL TOTAL CA: CPT | Performed by: INTERNAL MEDICINE

## 2023-10-11 RX ORDER — OXYCODONE HYDROCHLORIDE 5 MG/1
2.5 TABLET ORAL 2 TIMES DAILY PRN
Qty: 3 TABLET | Refills: 0 | Status: SHIPPED | OUTPATIENT
Start: 2023-10-11 | End: 2023-10-12 | Stop reason: SDUPTHER

## 2023-10-11 RX ORDER — GABAPENTIN 400 MG/1
400 CAPSULE ORAL
Qty: 30 CAPSULE | Refills: 0 | Status: SHIPPED | OUTPATIENT
Start: 2023-10-11 | End: 2023-10-12

## 2023-10-11 RX ORDER — LIDOCAINE 50 MG/G
1 PATCH TOPICAL DAILY
Qty: 5 PATCH | Refills: 0 | Status: SHIPPED | OUTPATIENT
Start: 2023-10-12

## 2023-10-11 RX ORDER — METHOCARBAMOL 750 MG/1
750 TABLET, FILM COATED ORAL EVERY 8 HOURS PRN
Qty: 15 TABLET | Refills: 0 | Status: SHIPPED | OUTPATIENT
Start: 2023-10-11

## 2023-10-11 RX ORDER — LIDOCAINE 50 MG/G
1 PATCH TOPICAL DAILY
Status: DISCONTINUED | OUTPATIENT
Start: 2023-10-11 | End: 2023-10-11 | Stop reason: HOSPADM

## 2023-10-11 RX ORDER — CLONAZEPAM 1 MG/1
1 TABLET ORAL 2 TIMES DAILY
Status: DISCONTINUED | OUTPATIENT
Start: 2023-10-11 | End: 2023-10-11 | Stop reason: HOSPADM

## 2023-10-11 RX ORDER — LANOLIN ALCOHOL/MO/W.PET/CERES
3 CREAM (GRAM) TOPICAL
Qty: 30 TABLET | Refills: 0 | Status: SHIPPED | OUTPATIENT
Start: 2023-10-11

## 2023-10-11 RX ADMIN — LIDOCAINE 1 PATCH: 700 PATCH TOPICAL at 12:07

## 2023-10-11 RX ADMIN — FINASTERIDE 5 MG: 5 TABLET, FILM COATED ORAL at 08:46

## 2023-10-11 RX ADMIN — HYDROMORPHONE HYDROCHLORIDE 0.2 MG: 0.2 INJECTION, SOLUTION INTRAMUSCULAR; INTRAVENOUS; SUBCUTANEOUS at 00:57

## 2023-10-11 RX ADMIN — ACETAMINOPHEN 975 MG: 325 TABLET, FILM COATED ORAL at 01:32

## 2023-10-11 RX ADMIN — ASPIRIN 81 MG: 81 TABLET, COATED ORAL at 08:46

## 2023-10-11 RX ADMIN — Medication 2.5 MG: at 03:27

## 2023-10-11 RX ADMIN — Medication 2.5 MG: at 08:46

## 2023-10-11 RX ADMIN — SERTRALINE 100 MG: 100 TABLET, FILM COATED ORAL at 08:46

## 2023-10-11 RX ADMIN — METOPROLOL SUCCINATE 75 MG: 50 TABLET, EXTENDED RELEASE ORAL at 08:45

## 2023-10-11 RX ADMIN — INSULIN LISPRO 3 UNITS: 100 INJECTION, SOLUTION INTRAVENOUS; SUBCUTANEOUS at 08:18

## 2023-10-11 RX ADMIN — CLONAZEPAM 1 MG: 1 TABLET ORAL at 08:46

## 2023-10-11 RX ADMIN — HYDROCHLOROTHIAZIDE 25 MG: 25 TABLET ORAL at 08:46

## 2023-10-11 RX ADMIN — ENOXAPARIN SODIUM 40 MG: 100 INJECTION SUBCUTANEOUS at 08:45

## 2023-10-11 RX ADMIN — NIFEDIPINE 120 MG: 30 TABLET, EXTENDED RELEASE ORAL at 08:46

## 2023-10-11 RX ADMIN — FOLIC ACID 2000 MCG: 1 TABLET ORAL at 08:46

## 2023-10-11 RX ADMIN — INSULIN LISPRO 2 UNITS: 100 INJECTION, SOLUTION INTRAVENOUS; SUBCUTANEOUS at 12:09

## 2023-10-11 RX ADMIN — CLONAZEPAM 0.5 MG: 0.5 TABLET ORAL at 14:50

## 2023-10-11 RX ADMIN — ACETAMINOPHEN 975 MG: 325 TABLET, FILM COATED ORAL at 12:09

## 2023-10-11 RX ADMIN — LOSARTAN POTASSIUM 100 MG: 50 TABLET, FILM COATED ORAL at 08:46

## 2023-10-11 NOTE — DISCHARGE INSTR - AVS FIRST PAGE
Please follow up with your pain specialist as soon as possible   Follow up with neurosurgery and your family doctor   Follow up with psychiatry     Use tylenol, ibuprofen as needed. Robaxin as needed for muscle spasm   You can use lidocaine patch on your lower back   Gabapentin at bedtime   Oxycone 2.5 mg twice a day as needed.  Don't use unless you pain is severe

## 2023-10-11 NOTE — ASSESSMENT & PLAN NOTE
History of urine retention and BPH. Patient he started to retain urine after his back surgery in April  Was seen in urology office as outpatient, had cystoscopy that showed prostate enlarged (140 g). Urologist discussed with him about surgery however patient declined at that time   Patient currently self caths 3 times a day  Had urinary infection in August treated for E.  Coli  Urinary retention protocol  Continue self cath   Follow-up with urology as outpatient

## 2023-10-11 NOTE — ASSESSMENT & PLAN NOTE
Home medications Zoloft 100 mg, Remeron 45 mg. Wife mentioned patient was tried on a lower dose of Remeron however he did not do well with it and Remeron was increased back to 45 mg daily. Also taking hydroxyzine at bedtime  On clonazepam 1 mg twice a day and 3rd pill as needed  Patient had episodes of anxiety yesterday also today. Psychiatry consulted, they recommended continuing same management and follow-up as outpatient with psychiatry.

## 2023-10-11 NOTE — CONSULTS
TELEConsultation - [facility] 79 y.o. male MRN: [id number]  Unit/Bed#: -01 Encounter: [id number]    REQUIRED DOCUMENTATION:     1. This service was provided via Telemedicine. 2. Provider located in [place]. 3. TeleMed provider: Carroll Craig MD  4. Identify all parties in room with patient during tele consult: patient  5. After connecting through televideo, patient was identified by name and date of birth. Parent/patient was then informed that this was being conducted confidentially over secure lines. My office door was closed. Parties in the room listed above as per #4. Patient acknowledged consent and understanding of privacy and security of the Telemedicine visit. The patient is aware this is a billable service and understands that he can discontinue the visit at any time. I informed the patient that I have reviewed their record in Epic and presented the opportunity for them to ask any questions regarding the visit today. The patient agreed to participate. Chief Complaint: Depression and anxiety    History of Present Illness   Physician Requesting Consult: Richar Torres MD    Reason for Consult / Principal Problem: Anxiety and Depression    Per H and P:   "Lazarus Bradley is a 79 y.o. male with a PMH of hypertension, diabetes, anxiety and depression, lumbar fusion in April who presents with fall. Radha Nicolas Per wife and patient, since the back surgery patient was having persistent back pain that was debilitating; had poor ambulation also decreased appetite. He was seen by APS as outpatient and started on gabapentin that was titrated up to 400 mg 4 times a day. Per wife patient fell 3 times today so he was brought to emergency department. Home medications Zoloft 100 mg, Remeron 45 mg. Wife mentioned patient was tried on a lower dose of Remeron however he did not do well with it and Remeron was increased back to 45 mg daily.   Also taking hydroxyzine at bedtime  On clonazepam 1 mg twice a day and 3rd pill as needed  Patient was noted to have pinpoint pupils today. Reviewed patient's medication list.  Not on opiates but on benzodiazepine. We will decrease Klonopin to 1 mg a day, also because patient need opiates to control his pain. Check urine drug. CTH w/o acute changes "    On evaluation,    Patient reports feeling anxious, including right now. Reports he feels anxious to leave the hospital, go to work, and work on his car. Is currently retired and wife is supporting the family. Lost his mother and sister 8 and 5 years ago respectively. He worried a lot about his health and A1c due to his sister dying from diabetes. Father and brother  a couple years ago. In the 80s, lost his 4 month old son. He is tearful on interview discussing his family members who have passed away. Reports his mood is 5/10 on average, experiences significant rumination, sleep and appetite poor, tired all the time, concentration poor, denies SI, thoughts of death, past or present. Has a psychiatrist at [address] [place] [address] - rx zoloft 100 mg, remeron 45 mg, clonazepam 1mg bid. Reports his clonazepam was decreased and he is feeling more anxious due to this. Denies HI/AVH. He reports that he has a difficult time with his ruminations especially centered around his grief and we discussed the importance of following up with an outpatient psychiatrist and therapist to which he is amenable for referrals. He discussed some guilt that he feels regarding the fact that he no longer works and the difficulty managing his shoulder pain at times. He acknowledges that he should not take opioids as he is already on benzodiazepines but he does request his Klonopin be increased back to 1 mg twice a day as he is experiencing significant anxiety with his decreased dose. He does not use any opioid medications and has no history of opioid use other than prescribed. He is not currently prescribed any opioids. Reports extremely intermittent alcohol use and medical marijuana use. Denies other substance use. Psychiatric Review Of Systems:  Medication side effects: none  Sleep: no change  Appetite: no change  Hygiene: able to tend to instrumental and basic ADLs  Anxiety Symptoms: endorses as per HPI  Psychotic Symptoms: denies  Depression Symptoms: endorses as per HPI  Manic Symptoms: denies  PTSD Symptoms: denies  Suicidal Thoughts: denies  Homicidal Thoughts: denies    Historical Information   Psychiatric History:   Diagnoses: Depression, Anxiety  Inpatient Hx: denies  [facility]  Medications/Trials: zoloft, remeron, klonopin; has taken other meds but doesn't remember    Substance Abuse History:    Social History     Substance and Sexual Activity   Alcohol Use Yes    Alcohol/week: 4.0 - 7.0 standard drinks of alcohol    Types: 1 - 2 Glasses of wine, 2 - 3 Cans of beer, 1 - 2 Shots of liquor per week    Comment: only on special occasions     Social History     Substance and Sexual Activity   Drug Use Not Currently    Frequency: 7.0 times per week    Types: Marijuana    Comment: Medical Marijuana     Medical marijuana - for pain, smokes 1/4-1/2 joint 3-4 times per month    Alcohol - used to drink in the 80s, drinks 1 beer on occasion but denies other alcohol use, no liquor    I discussed substance abuse with the patient and, if pertinent, discussed risks vs benefits of decreasing frequency of use.     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[employer])  Rest of social history as per below:  Social History     Socioeconomic History    Marital status: /Civil Union     Spouse name: Not on file    Number of children: Not on file    Years of education: Not on file    Highest education level: Not on file   Occupational History    Not on file   Tobacco Use    Smoking status: Former     Packs/day: 0.25     Years: 5.00     Total pack years: 1.25     Types: Cigarettes     Start date: 1975     Quit date: 1981     Years since quittin.3    Smokeless tobacco: Never    Tobacco comments:     quit    Vaping Use    Vaping Use: Some days   Substance and Sexual Activity    Alcohol use: Yes     Alcohol/week: 4.0 - 7.0 standard drinks of alcohol     Types: 1 - 2 Glasses of wine, 2 - 3 Cans of beer, 1 - 2 Shots of liquor per week     Comment: only on special occasions    Drug use: Not Currently     Frequency: 7.0 times per week     Types: Marijuana     Comment: Medical Marijuana    Sexual activity: Yes     Partners: Female     Birth control/protection: None   Other Topics Concern    Not on file   Social History Narrative    Not on file     Social Determinants of Health     Financial Resource Strain: Not on file   Food Insecurity: No Food Insecurity (10/10/2023)    Hunger Vital Sign     Worried About Running Out of Food in the Last Year: Never true     Ran Out of Food in the Last Year: Never true   Transportation Needs: No Transportation Needs (10/10/2023)    PRAPARE - Transportation     Lack of Transportation (Medical): No     Lack of Transportation (Non-Medical):  No   Physical Activity: Not on file   Stress: Not on file   Social Connections: Not on file   Intimate Partner Violence: Not on file   Housing Stability: Low Risk  (10/10/2023)    Housing Stability Vital Sign     Unable to Pay for Housing in the Last Year: No     Number of Places Lived in the Last Year: 1     Unstable Housing in the Last Year: No       Past Medical History:   Diagnosis Date    Anxiety     Arthritis     Cancer ([address] [facility])     Depression     Diabetes mellitus ([address] [facility])     Hypertension     Liver disease     Mitral valve prolapse     Peripheral neuropathy Neuropathy    PONV (postoperative nausea and vomiting)     Thyroid disease        Medical Review Of Systems:  Patient denies headache or dizziness. Patient denies chest pain or palpitations. Patient denies difficulty breathing or wheezing. Patient denies nausea, vomiting, or diarrhea. Patient denies polyuria or polydipsia. Patient denies weakness or numbness. Pertinent positives as per HPI.     Meds/Allergies   Allergies   Allergen Reactions    Abilify [Aripiprazole] Tremor     Shaking      Cephalexin Rash    M[employer] Sm[employer] Allergic Rhinitis    Pregabalin Tremor     Lyrica - shaking feeling     Current Facility-Administered Medications   Medication Dose Route Frequency    acetaminophen (TYLENOL) tablet 975 mg  975 mg Oral Q8H    aspirin (ECOTRIN LOW STRENGTH) EC tablet 81 mg  81 mg Oral Daily    clonazePAM (KlonoPIN) tablet 0.5 mg  0.5 mg Oral BID PRN    clonazePAM (KlonoPIN) tablet 1 mg  1 mg Oral Daily    enoxaparin (LOVENOX) subcutaneous injection 40 mg  40 mg Subcutaneous Daily    finasteride (PROSCAR) tablet 5 mg  5 mg Oral Daily    fluticasone (FLONASE) 50 mcg/act nasal spray 1 spray  1 spray Nasal BID PRN    folic acid (FOLVITE) tablet 2,000 mcg  2,000 mcg Oral Daily    gabapentin (NEURONTIN) capsule 300 mg  300 mg Oral HS    losartan (COZAAR) tablet 100 mg  100 mg Oral QAM    And    hydrochlorothiazide (HYDRODIURIL) tablet 25 mg  25 mg Oral QAM    insulin lispro (HumaLOG) 100 units/mL subcutaneous injection 1-5 Units  1-5 Units Subcutaneous HS    insulin lispro (HumaLOG) 100 units/mL subcutaneous injection 1-6 Units  1-6 Units Subcutaneous TID AC    lidocaine (LIDODERM) 5 % patch 1 patch  1 patch Topical Daily    melatonin tablet 3 mg  3 mg Oral HS    methocarbamol (ROBAXIN) tablet 750 mg  750 mg Oral Q6H PRN    metoprolol succinate (TOPROL-XL) 24 hr tablet 75 mg  75 mg Oral QAM    mirtazapine (REMERON) tablet 45 mg  45 mg Oral HS    NIFEdipine (PROCARDIA XL) 24 hr tablet 120 mg  120 mg Oral QAM oxyCODONE (ROXICODONE) split tablet 2.5 mg  2.5 mg Oral BID PRN    polyethylene glycol (MIRALAX) packet 17 g  17 g Oral Daily PRN    pravastatin (PRAVACHOL) tablet 20 mg  20 mg Oral Daily With Dinner    senna-docusate sodium (SENOKOT S) 8.6-50 mg per tablet 1 tablet  1 tablet Oral HS    sertraline (ZOLOFT) tablet 100 mg  100 mg Oral QAM    tamsulosin (FLOMAX) capsule 0.4 mg  0.4 mg Oral Daily With Dinner       Current Medications:  Current medications as per above. All medications have been reviewed. Risks, benefits, alternatives, and possible side effects of patient's psychiatric medications were discussed with patient. Objective   Vital signs in last 24 hours:  Temp:  [97.2 °F (36.2 °C)-97.5 °F (36.4 °C)] 97.3 °F (36.3 °C)  HR:  [69-91] 91  Resp:  [18] 18  BP: (137-151)/(71-77) 148/76    Mental Status Exam:  Appearance: casually dressed, consistent with stated age  Motor: - psychomotor retardation, no gait abnormalities  Behavior: cooperative, answers questions appropriately, tearful at times  Speech: soft, normal rhythm  Mood: anxious  Affect: constricted, depressed-appearing  Thought Process: linear and goal-oriented  Thought Content: denies auditory hallucinations, denies visual hallucinations, denies delusions  Risk Potential: denies suicidal ideation, plan, or intent. Denies homicidal ideation  Sensorium: Oriented to person, place, time, and situation  Cognition: cognitive ability appears intact but was not quantitatively tested  Consciousness: alert and awake  Attention: intact, able to focus without difficulty  Insight: fair  Judgement: limited      Laboratory results:  I have personally reviewed all pertinent laboratory/tests results.   Recent Results (from the past 48 hour(s))   Protime-INR    Collection Time: 10/09/23 12:08 PM   Result Value Ref Range    Protime 13.2 11.6 - 14.5 seconds    INR 0.96 0.84 - 1.19   APTT    Collection Time: 10/09/23 12:08 PM   Result Value Ref Range    PTT 27 23 - 37 seconds   Blood culture #1    Collection Time: 10/09/23 12:08 PM    Specimen: Arm, Left; Blood   Result Value Ref Range    Blood Culture No Growth at 24 hrs. Lactic acid, plasma (w/reflex if result > 2.0)    Collection Time: 10/09/23 12:08 PM   Result Value Ref Range    LACTIC ACID 2.0 0.5 - 2.0 mmol/L   Procalcitonin    Collection Time: 10/09/23 12:08 PM   Result Value Ref Range    Procalcitonin 0.09 <=0.25 ng/ml   Blood culture #2    Collection Time: 10/09/23 12:14 PM    Specimen: Arm, Left; Blood   Result Value Ref Range    Blood Culture No Growth at 24 hrs.     Urinalysis with microscopic    Collection Time: 10/09/23  5:57 PM   Result Value Ref Range    Color, UA Yellow     Clarity, UA Slightly Cloudy     Specific Gravity, UA 1.010 1.005 - 1.030    pH, UA 6.0 4.5, 5.0, 5.5, 6.0, 6.5, 7.0, 7.5, 8.0    Leukocytes, UA Large (A) Negative    Nitrite, UA Positive (A) Negative    Protein, UA Negative Negative mg/dl    Glucose, UA 1000 (1%) (A) Negative mg/dl    Ketones, UA Negative Negative mg/dl    Urobilinogen, UA <2.0 <2.0 mg/dl mg/dl    Bilirubin, UA Negative Negative    Occult Blood, UA Negative Negative    RBC, UA None Seen None Seen, 2-4 /hpf    WBC, UA 20-30 (A) None Seen, 2-4, 5-60 /hpf    Epithelial Cells Occasional None Seen, Occasional /hpf    Bacteria, UA Innumerable (A) None Seen, Occasional /hpf    OTHER OBSERVATIONS WBCs Clumped  Transitional Epithelial Cells    Rapid drug screen, urine    Collection Time: 10/09/23  5:57 PM   Result Value Ref Range    Amph/Meth UR Negative Negative    Barbiturate Ur Negative Negative    Benzodiazepine Urine Negative Negative    Cocaine Urine Negative Negative    Methadone Urine      Opiate Urine Negative Negative    PCP Ur Negative Negative    THC Urine Positive (A) Negative    Oxycodone Urine Negative Negative   Fingerstick Glucose (POCT)    Collection Time: 10/09/23  8:56 PM   Result Value Ref Range    POC Glucose 136 65 - 140 mg/dl   Basic metabolic panel Collection Time: 10/10/23  3:52 AM   Result Value Ref Range    Sodium 135 135 - 147 mmol/L    Potassium 3.8 3.5 - 5.3 mmol/L    Chloride 100 96 - 108 mmol/L    CO2 28 21 - 32 mmol/L    ANION GAP 7 mmol/L    BUN 16 5 - 25 mg/dL    Creatinine 0.76 0.60 - 1.30 mg/dL    Glucose 106 65 - 140 mg/dL    Calcium 9.1 8.4 - 10.2 mg/dL    eGFR 95 ml/min/1.73sq m   CBC (With Platelets)    Collection Time: 10/10/23  3:52 AM   Result Value Ref Range    WBC 9.81 4.31 - 10.16 Thousand/uL    RBC 4.09 3.88 - 5.62 Million/uL    Hemoglobin 8.4 (L) 12.0 - 17.0 g/dL    Hematocrit 29.3 (L) 36.5 - 49.3 %    MCV 72 (L) 82 - 98 fL    MCH 20.5 (L) 26.8 - 34.3 pg    MCHC 28.7 (L) 31.4 - 37.4 g/dL    RDW 19.8 (H) 11.6 - 15.1 %    Platelets 121 (H) 123 - 390 Thousands/uL    MPV 9.9 8.9 - 12.7 fL   Fingerstick Glucose (POCT)    Collection Time: 10/10/23  7:33 AM   Result Value Ref Range    POC Glucose 158 (H) 65 - 140 mg/dl   Fingerstick Glucose (POCT)    Collection Time: 10/10/23 11:06 AM   Result Value Ref Range    POC Glucose 227 (H) 65 - 140 mg/dl   Fingerstick Glucose (POCT)    Collection Time: 10/10/23  4:16 PM   Result Value Ref Range    POC Glucose 137 65 - 140 mg/dl   Fingerstick Glucose (POCT)    Collection Time: 10/10/23  8:34 PM   Result Value Ref Range    POC Glucose 192 (H) 65 - 140 mg/dl   Basic metabolic panel    Collection Time: 10/11/23  3:29 AM   Result Value Ref Range    Sodium 135 135 - 147 mmol/L    Potassium 3.8 3.5 - 5.3 mmol/L    Chloride 99 96 - 108 mmol/L    CO2 28 21 - 32 mmol/L    ANION GAP 8 mmol/L    BUN 13 5 - 25 mg/dL    Creatinine 0.81 0.60 - 1.30 mg/dL    Glucose 154 (H) 65 - 140 mg/dL    Calcium 9.3 8.4 - 10.2 mg/dL    eGFR 92 ml/min/1.73sq m   CBC    Collection Time: 10/11/23  3:29 AM   Result Value Ref Range    WBC 9.78 4.31 - 10.16 Thousand/uL    RBC 4.24 3.88 - 5.62 Million/uL    Hemoglobin 8.6 (L) 12.0 - 17.0 g/dL    Hematocrit 30.3 (L) 36.5 - 49.3 %    MCV 72 (L) 82 - 98 fL    MCH 20.3 (L) 26.8 - 34.3 pg MCHC 28.4 (L) 31.4 - 37.4 g/dL    RDW 19.6 (H) 11.6 - 15.1 %    Platelets 968 (H) 799 - 390 Thousands/uL    MPV 10.4 8.9 - 12.7 fL   Fingerstick Glucose (POCT)    Collection Time: 10/11/23  7:31 AM   Result Value Ref Range    POC Glucose 249 (H) 65 - 140 mg/dl   Fingerstick Glucose (POCT)    Collection Time: 10/11/23 10:34 AM   Result Value Ref Range    POC Glucose 219 (H) 65 - 140 mg/dl          Assessment/Plan     Assessment: 80-year-old man with history of major depressive disorder and generalized anxiety disorder currently admitted to the hospital status post fall. Patient currently exhibits depressed mood, poor sleep, appetite, energy, concentration and meets criteria for major depressive disorder. He also worries about multiple different things more than half the time for the past several years. He currently takes Zoloft 100 mg daily and Remeron 45 mg daily for depression and anxiety. These medications have been effective along with his clonazepam 1 mg twice daily. He does not currently have outpatient psychiatric treatment but is amenable to resuming outpatient psychiatric follow-up. His primary care doctor prescribes his medications in the meantime. He is also amenable to referral for outpatient psychotherapy. He denies any suicidal thoughts and denies HI or AVH. He does not meet criteria for inpatient psychiatric hospitalization and is stable for discharge with outpatient follow-up. Diagnosis: Major Depressive Disorder, Generalized Anxiety Disorder    Recommendations:   -Continue Zoloft 100 mg daily  -Continue Remeron 45 mg daily  -Recommend increasing clonazepam back to 1 mg twice daily. It is unlikely that his pupillary constriction was due to benzodiazepines as these typically do not impact pupillary size or reactivity. He denies any opioid use and is not prescribed any opioids. No history of overdose.   -Recommend outpatient referrals for psychiatric treatment and psychotherapy  --Please TigerText with any questions or concerns. Diagnoses, available treatment options, alternatives to treatment, and risks vs. benefits of current psychiatric treatment plan were discussed with the patient. Prior records were reviewed in [address] [place] [address]. The case was discussed with the primary team.    Pasquale Gonzalez MD    This note has been constructed using a voice recognition system. There may be translation, syntax, or grammatical errors. If you have any questions, please contact the dictating provider.

## 2023-10-11 NOTE — ASSESSMENT & PLAN NOTE
Per patient's wife, patient fell 3 times yesterday  Wife reported patient has ambulatory dysfunction since the back surgery a few months ago. She reported because of the pain his legs are giving up also he rested in bed most of the time since the back surgery. Suspect this is multifactorial: Back pain, deconditioning, and medication side effect  Patient's wife reported patient is drowsy after gabapentin. Decrease gabapentin to 300 mg to HS only, discontinue hydroxyzine. fall precaution. PT/OT- recommended level 3   Patient will be discharged home.

## 2023-10-11 NOTE — DISCHARGE SUMMARY
[address] [facility]  Discharge- Ryland Jacobo 1955, 79 y.o. male MRN: [id number]  Unit/Bed#: MS Josette-Fidel Encounter: [id number]  Primary Care Provider: Judy Aviles DO   Date and time admitted to hospital: 10/9/2023 10:24 AM    * [address]  Per patient's wife, patient fell 3 times yesterday  Wife reported patient has ambulatory dysfunction since the back surgery a few months ago. She reported because of the pain his legs are giving up also he rested in bed most of the time since the back surgery. Suspect this is multifactorial: Back pain, deconditioning, and medication side effect  Patient's wife reported patient is drowsy after gabapentin. Decrease gabapentin to 300 mg to HS only, discontinue hydroxyzine. fall precaution. PT/OT- recommended level 3   Patient will be discharged home. Hypochromic microcytic anemia  Assessment & Plan  Recent Labs     10/09/23  1039 10/10/23  0352 10/11/23  0329   HGB 8.6* 8.4* 8.6*        Anemic since the back surgery back in April   Said he had colonoscopy in the past that were normal   iron panel -in process. Follow-up with family doctor    Hypertension  Assessment & Plan  Continue HCTZ losartan    Anxiety and depression  Assessment & Plan  Home medications Zoloft 100 mg, Remeron 45 mg. Wife mentioned patient was tried on a lower dose of Remeron however he did not do well with it and Remeron was increased back to 45 mg daily. Also taking hydroxyzine at bedtime  On clonazepam 1 mg twice a day and 3rd pill as needed  Patient had episodes of anxiety yesterday also today. Psychiatry consulted, they recommended continuing same management and follow-up as outpatient with psychiatry. Chronic bilateral low back pain with sciatica  Assessment & Plan  Patient's status post T12-S1 fusion with bilateral laminectomy and facetectomy in April 2023. Patient reported continuous of back pain since, radiating bilateral legs.   Per wife and patient, the pain was disabling, patient mostly resting in bed also with decreased oral intake. Was seen as outpatient by acute pain management and was started on gabapentin that was slowly titrated to 400 mg 4 times a day. Per wife, patient somnolent after gabapentin. Was on steroids before, not currently  Also on medical marijuana  Not on opiates  Because of fall, patient had CT lumbar that showed development of lucency S1 around bilateral screws discerning for loosening. No bone destruction but correlation with infection, clinically   neurosurgery was consulted, recommended upright x-ray lumbar spine. Per neurosurgery, there is a hardware loosening, but there is no movement noted on the XR with bending and there is no spinal instability. No surgery indicated   Patient has milld leukocytosis that might be reactive, has normal procalcitonin. No concern of infection at this point. Patient reported multiple times that his pain is 10 out of 10 however he laid comfortably in bed in no distress. Patient will be discharged on lidocaine patch only, 3 tablets of oxycodone, gabapentin at bedtime also he was advised to take ibuprofen/Den[place] for his pain. Recommended him to have physical therapy as outpatient as scheduled. Recommended outpatient pain management    Urinary retention  Assessment & Plan  History of urine retention and BPH. Patient he started to retain urine after his back surgery in April  Was seen in urology office as outpatient, had cystoscopy that showed prostate enlarged (140 g). Urologist discussed with him about surgery however patient declined at that time   Patient currently self caths 3 times a day  Had urinary infection in August treated for E.  Coli  Urinary retention protocol  Continue self cath   Follow-up with urology as outpatient    Hypercholesterolemia  Assessment & Plan  Continue statin    DM (diabetes mellitus) ([address])  Assessment & Plan  Lab Results   Component Value Date    HGBA1C 6.3 (H) 03/14/2023       Recent Labs     10/10/23  2034 10/11/23  0731 10/11/23  1034 10/11/23  1356   POCGLU 192* 249* 219* 254*         Blood Sugar Average: Last 72 hrs:  (P) 196.5On Jardiance as outpatient. Also because of poor oral intake patient was not taking his 30 units of Lantus in the morning, since June   Continue home medication      Diabetic peripheral neuropathy [facility])  Assessment & Plan  Lab Results   Component Value Date    HGBA1C 6.3 (H) 03/14/2023       Recent Labs     10/10/23  2034 10/11/23  0731 10/11/23  1034 10/11/23  1356   POCGLU 192* 249* 219* 254*         Blood Sugar Average: Last 72 hrs:  (P) 196.5Chronic, currently on gabapentin. Had side effects on Lyrica  Will start gabapentin at bedtime only       Hypoxia-resolved as of 10/11/2023  Assessment & Plan  Patient was noted to have oxygen saturation in the 70s while he was sleeping. Saturating fine at room air . Also patient was snoring. Concerning for MANUEL. Medical Problems       Resolved Problems  Date Reviewed: 10/9/2023            Resolved    Hypoxia 10/11/2023     Resolved by  Jacques Lemons MD        Discharging Physician / Practitioner: Jacques Lemons MD  PCP: Fred Ferris DO  Admission Date:   Admission Orders (From admission, onward)       Ordered        10/10/23 1552  Inpatient Admission  Once            10/09/23 1352  Place in Observation  Once                          Discharge Date: 10/11/23    Consultations During Hospital Stay:  Neurosurgery PT, OT    Procedures Performed:   None    Significant Findings / Test Results:   XR spine lumbar minimum 4 views non injury  Result Date: 10/9/2023  Impression: The orthopedic hardware is intact. No significant spondylolisthesis with flexion or extension. Irregular endplate at K8-J1. Workstation performed: [id number]     TRAUMA - CT spine lumbar wo contrast  Result Date: 10/9/2023  Impression: No evidence of fracture or traumatic malalignment.  Chronic findings, as per the body of the report. Development of lucency around the S1 bilateral screws up to 6 mm, concerning for loosening. No destructive changes in the bone but correlate clinically to exclude symptoms of infection. Workstation performed: [id number]     TRAUMA - CT spine cervical wo contrast  Result Date: 10/9/2023  Impression: No cervical spine fracture or traumatic malalignment. Advanced multilevel degenerative changes. Workstation performed: [id number]     TRAUMA - CT head wo contrast  Result Date: 10/9/2023  Impression: No acute intracranial abnormality. Diffuse atrophic changes. Workstation performed: [id number]     XR Trauma chest portable  Result Date: 10/9/2023  Impression: No acute cardiopulmonary disease. Findings are stable Workstation performed: [id number]       Incidental Findings:   None      Test Results Pending at Discharge (will require follow up): Iron panel     Outpatient Tests Requested:  None    Complications: None    Reason for Admission: Back pain    Hospital Course:   Ryland Jacobo is a 79 y.o. male patient with past medical history of hypertension, diabetes, anxiety and depression, lumbar fusion in April who originally presented to the hospital on 10/9/2023 due to fall. On presentation CT lumbar showed developing lucency S1 around bilateral screw concerning for loosening. Neurosurgery consulted, and x-ray lumbar was done-Per neurosurgery there is no movement noted on bending films and no concern for any spinal instability even if hardware loosening. No recommendations for surgery at that time. Patient reported back pain radiating into his legs since the surgery in April. He reported he cannot sleep well also poor eating and weight loss. Patient was noted to be comfortably be in bed in no distress, even he stated his pain is 10 out of 10. Patient was noted to be very anxious, if psychiatry consulted they recommended no changes in medications but continue the same.   Patient will be discharged and will follow-up with PCP and pain management as outpatient. Please see above list of diagnoses and related plan for additional information. Condition at Discharge: good    Discharge Day Visit / Exam:   Subjective: Back pain  Vitals: Blood Pressure: 148/76 (10/11/23 0731)  Pulse: 91 (10/11/23 0731)  Temperature: (!) 97.3 °F (36.3 °C) (10/11/23 0745)  Temp Source: Temporal (10/11/23 0745)  Respirations: 18 (10/11/23 0731)  Height: 6' 2" (188 cm) (10/09/23 1510)  Weight - Scale: 81.2 kg (179 lb 0.2 oz) (10/09/23 1510)  SpO2: 100 % (10/11/23 0745)  Exam:   Physical Exam  Vitals and nursing note reviewed. Constitutional:       General: He is not in acute distress. Appearance: He is not diaphoretic. HENT:      Head: Normocephalic. Eyes:      General: No scleral icterus. Right eye: No discharge. Left eye: No discharge. Cardiovascular:      Rate and Rhythm: Normal rate and regular rhythm. Heart sounds: No murmur heard. Pulmonary:      Effort: Pulmonary effort is normal. No respiratory distress. Breath sounds: Normal breath sounds. No wheezing, rhonchi or rales. Abdominal:      General: There is no distension. Palpations: Abdomen is soft. Tenderness: There is no abdominal tenderness. There is no guarding or rebound. Musculoskeletal:      Cervical back: Normal range of motion. Right lower leg: No edema. Left lower leg: No edema. Skin:     General: Skin is warm. Neurological:      Mental Status: He is alert and oriented to person, place, and time. Psychiatric:         Mood and Affect: Mood normal.         Behavior: Behavior normal.          Discussion with Family: Attempted to update  (wife and daughter) via phone. Left voicemail. Discharge instructions/Information to patient and family:   See after visit summary for information provided to patient and family.       Provisions for Follow-Up Care:  See after visit summary for information related to follow-up care and any pertinent home health orders. Disposition:   Home with VNA Services (Reminder: Complete face to face encounter)    Planned Readmission: NO     Discharge Statement:  I spent 40 minutes discharging the patient. This time was spent on the day of discharge. I had direct contact with the patient on the day of discharge. Greater than 50% of the total time was spent examining patient, answering all patient questions, arranging and discussing plan of care with patient as well as directly providing post-discharge instructions. Additional time then spent on discharge activities. Discharge Medications:  See after visit summary for reconciled discharge medications provided to patient and/or family.       **Please Note: This note may have been constructed using a voice recognition system** left upper arm

## 2023-10-11 NOTE — NURSING NOTE
Discharge instructions reviewed and provided to patient and wife. Pt taken via wc to front lobby to meet his wife to go home via car.

## 2023-10-11 NOTE — ASSESSMENT & PLAN NOTE
Lab Results   Component Value Date    HGBA1C 6.3 (H) 03/14/2023       Recent Labs     10/10/23  2034 10/11/23  0731 10/11/23  1034 10/11/23  1356   POCGLU 192* 249* 219* 254*         Blood Sugar Average: Last 72 hrs:  (P) 196.5Chronic, currently on gabapentin.   Had side effects on Lyrica  Will start gabapentin at bedtime only

## 2023-10-11 NOTE — CASE MANAGEMENT
Case Management Discharge Planning Note    Patient name Grzegorz Ang  Location /-89 MRN [id number]  : 1955 Date 10/11/2023       Current Admission Date: 10/9/2023  Current Admission Diagnosis:Fall   Patient Active Problem List    Diagnosis Date Noted    Hypochromic microcytic anemia 10/10/2023    Fall 10/09/2023    Chronic bilateral low back pain with sciatica 10/09/2023    Anxiety and depression 10/09/2023    Hypertension 10/09/2023    Hypoxia 10/09/2023    Benign prostatic hyperplasia with urinary retention 2023    Scrotal pain 2023    Lower urinary tract symptoms 2023    Status post lumbar spinal fusion 2023    Hyponatremia 2023    Gallstones 2023    Anemia 2023    Urinary retention 2023    PONV (postoperative nausea and vomiting)     Medical marijuana use     Chronic bilateral low back pain without sciatica 2023    Chronic pain syndrome 2022    Liver disease 08/10/2022    Bronchitis, mucopurulent recurrent ([address]) 2022    Cirrhosis, alcoholic ([address])     Diabetic peripheral neuropathy ([address]) 2022    Herniated nucleus pulposus of lumbosacral region 2022    Thyroid cancer ([address]) 2021    Alcoholism in remission ([address]) 2021    Benzodiazepine dependence ([address]) 2021    Bilateral adhesive capsulitis of shoulders 2021    DM (diabetes mellitus) ([address]) 2021    Hypercholesterolemia 2021    Lumbar spondylosis 2021      LOS (days): 1  Geometric Mean LOS (GMLOS) (days): 3.10  Days to GMLOS:2.3     OBJECTIVE:  Risk of Unplanned Readmission Score: 22.73         Current admission status: Inpatient   Preferred Pharmacy:   [facility].  [address] [facility] [address]  Phone: [phone] Fax: [phone]    Primary Care Provider: Delilah Hall DO    Primary Insurance: [facility]  Secondary Insurance: [place] [facility]    DISCHARGE DETAILS:                                Requested [address] [facility]         Is the patient interested in [address] at discharge?: No         Other Referral/Resources/Interventions Provided:  Interventions: Outpatient PT, Outpatient OT         Treatment Team Recommendation: Home  Discharge Destination Plan[de-identified] Home                     IMM reviewed with patient's caregiver, patient's caregiver agrees with discharge determination. IMM Given (Date):: 10/11/23 (6967c)  IMM Given to[de-identified] Family  Family notified[de-identified] spouse, [place] with pt's spouse and [facility]. Spouse stated that pt has OP therapy already set up. Spouse prefers to keep OP therapy and will transport pt to appts. CM cancelled referral in [place].

## 2023-10-11 NOTE — ASSESSMENT & PLAN NOTE
Lab Results   Component Value Date    HGBA1C 6.3 (H) 03/14/2023       Recent Labs     10/10/23  2034 10/11/23  0731 10/11/23  1034 10/11/23  1356   POCGLU 192* 249* 219* 254*         Blood Sugar Average: Last 72 hrs:  (P) 196.5On Jardiance as outpatient.   Also because of poor oral intake patient was not taking his 30 units of Lantus in the morning, since June   Continue home medication

## 2023-10-11 NOTE — ASSESSMENT & PLAN NOTE
Recent Labs     10/09/23  1039 10/10/23  0352 10/11/23  0329   HGB 8.6* 8.4* 8.6*        Anemic since the back surgery back in April   Said he had colonoscopy in the past that were normal   iron panel -in process.   Follow-up with family doctor

## 2023-10-11 NOTE — PLAN OF CARE
Problem: MOBILITY - ADULT  Goal: Maintain or return to baseline ADL function  Description: INTERVENTIONS:  -  Assess patient's ability to carry out ADLs; assess patient's baseline for ADL function and identify physical deficits which impact ability to perform ADLs (bathing, care of mouth/teeth, toileting, grooming, dressing, etc.)  - Assess/evaluate cause of self-care deficits   - Assess range of motion  - Assess patient's mobility; develop plan if impaired  - Assess patient's need for assistive devices and provide as appropriate  - Encourage maximum independence but intervene and supervise when necessary  - Involve family in performance of ADLs  - Assess for home care needs following discharge   - Consider OT consult to assist with ADL evaluation and planning for discharge  - Provide patient education as appropriate  Outcome: Progressing  Goal: Maintains/Returns to pre admission functional level  Description: INTERVENTIONS:  - Perform BMAT or MOVE assessment daily.   - Set and communicate daily mobility goal to care team and patient/family/caregiver. - Collaborate with rehabilitation services on mobility goals if consulted  - Perform Range of Motion 3 times a day. - Reposition patient every 2 hours.   - Dangle patient 3 times a day  - Stand patient 3 times a day  - Ambulate patient 3 times a day  - Out of bed to chair 3 times a day   - Out of bed for meals 3 times a day  - Out of bed for toileting  - Record patient progress and toleration of activity level   Outcome: Progressing     Problem: Prexisting or High Potential for Compromised Skin Integrity  Goal: Skin integrity is maintained or improved  Description: INTERVENTIONS:  - Identify patients at risk for skin breakdown  - Assess and monitor skin integrity  - Assess and monitor nutrition and hydration status  - Monitor labs   - Assess for incontinence   - Turn and reposition patient  - Assist with mobility/ambulation  - Relieve pressure over bony prominences  - Avoid friction and shearing  - Provide appropriate hygiene as needed including keeping skin clean and dry  - Evaluate need for skin moisturizer/barrier cream  - Collaborate with interdisciplinary team   - Patient/family teaching  - Consider wound care consult   Outcome: Progressing     Problem: PAIN - ADULT  Goal: Verbalizes/displays adequate comfort level or baseline comfort level  Description: Interventions:  - Encourage patient to monitor pain and request assistance  - Assess pain using appropriate pain scale  - Administer analgesics based on type and severity of pain and evaluate response  - Implement non-pharmacological measures as appropriate and evaluate response  - Consider cultural and social influences on pain and pain management  - Notify physician/advanced practitioner if interventions unsuccessful or patient reports new pain  Outcome: Progressing     Problem: INFECTION - ADULT  Goal: Absence or prevention of progression during hospitalization  Description: INTERVENTIONS:  - Assess and monitor for signs and symptoms of infection  - Monitor lab/diagnostic results  - Monitor all insertion sites, i.e. indwelling lines, tubes, and drains  - Monitor endotracheal if appropriate and nasal secretions for changes in amount and color  - [place] appropriate cooling/warming therapies per order  - Administer medications as ordered  - Instruct and encourage patient and family to use good hand hygiene technique  - Identify and instruct in appropriate isolation precautions for identified infection/condition  Outcome: Progressing  Goal: Absence of fever/infection during neutropenic period  Description: INTERVENTIONS:  - Monitor WBC    Outcome: Progressing     Problem: SAFETY ADULT  Goal: Maintain or return to baseline ADL function  Description: INTERVENTIONS:  -  Assess patient's ability to carry out ADLs; assess patient's baseline for ADL function and identify physical deficits which impact ability to perform ADLs (bathing, care of mouth/teeth, toileting, grooming, dressing, etc.)  - Assess/evaluate cause of self-care deficits   - Assess range of motion  - Assess patient's mobility; develop plan if impaired  - Assess patient's need for assistive devices and provide as appropriate  - Encourage maximum independence but intervene and supervise when necessary  - Involve family in performance of ADLs  - Assess for home care needs following discharge   - Consider OT consult to assist with ADL evaluation and planning for discharge  - Provide patient education as appropriate  Outcome: Progressing  Goal: Maintains/Returns to pre admission functional level  Description: INTERVENTIONS:  - Perform BMAT or MOVE assessment daily.   - Set and communicate daily mobility goal to care team and patient/family/caregiver. - Collaborate with rehabilitation services on mobility goals if consulted  - Perform Range of Motion 3 times a day. - Reposition patient every 2 hours.   - Dangle patient 3 times a day  - Stand patient 3 times a day  - Ambulate patient 3 times a day  - Out of bed to chair 3 times a day   - Out of bed for meals 3 times a day  - Out of bed for toileting  - Record patient progress and toleration of activity level   Outcome: Progressing  Goal: Patient will remain free of falls  Description: INTERVENTIONS:  - Educate patient/family on patient safety including physical limitations  - Instruct patient to call for assistance with activity   - Consult OT/PT to assist with strengthening/mobility   - Keep Call bell within reach  - Keep bed low and locked with side rails adjusted as appropriate  - Keep care items and personal belongings within reach  - Initiate and maintain comfort rounds  - Make Fall Risk Sign visible to staff  - Offer Toileting every 2 Hours, in advance of need  - Initiate/Maintain bed/ chair alarm  - Obtain necessary fall risk management equipment: walker  - Apply yellow socks and bracelet for high fall risk patients  - Consider moving patient to room near nurses station  Outcome: Progressing     Problem: DISCHARGE PLANNING  Goal: Discharge to home or other facility with appropriate resources  Description: INTERVENTIONS:  - Identify barriers to discharge w/patient and caregiver  - Arrange for needed discharge resources and transportation as appropriate  - Identify discharge learning needs (meds, wound care, etc.)  - Arrange for interpretive services to assist at discharge as needed  - Refer to Case Management Department for coordinating discharge planning if the patient needs post-hospital services based on physician/advanced practitioner order or complex needs related to functional status, cognitive ability, or social support system  Outcome: Progressing     Problem: Knowledge Deficit  Goal: Patient/family/caregiver demonstrates understanding of disease process, treatment plan, medications, and discharge instructions  Description: Complete learning assessment and assess knowledge base.   Interventions:  - Provide teaching at level of understanding  - Provide teaching via preferred learning methods  Outcome: Progressing     Problem: Potential for Falls  Goal: Patient will remain free of falls  Description: INTERVENTIONS:  - Educate patient/family on patient safety including physical limitations  - Instruct patient to call for assistance with activity   - Consult OT/PT to assist with strengthening/mobility   - Keep Call bell within reach  - Keep bed low and locked with side rails adjusted as appropriate  - Keep care items and personal belongings within reach  - Initiate and maintain comfort rounds  - Make Fall Risk Sign visible to staff  - Offer Toileting every 2 Hours, in advance of need  - Initiate/Maintain bed/ chair alarm  - Obtain necessary fall risk management equipment: walker  - Apply yellow socks and bracelet for high fall risk patients  - Consider moving patient to room near nurses station  Outcome: Progressing

## 2023-10-11 NOTE — UTILIZATION REVIEW
NOTIFICATION OF INPATIENT ADMISSION   AUTHORIZATION REQUEST   SERVICING FACILITY:   [address] [place] [address]  [address] [facility] [address]  Tax ID: [id number]  NPI: [id number] ATTENDING PROVIDER:  Attending Name and NPI#: Abdoulaye Grijalva Md [[id number]]  Address: [address] [facility] [address]  Phone: [phone]   ADMISSION INFORMATION:  Place of Service: [address] [place] [address]  Place of Service Code: 21  Inpatient Admission Date/Time: 10/10/23  3:52 PM  Discharge Date/Time: No discharge date for patient encounter. Admitting Diagnosis Code/Description:  Injury [T14.90XA]  Fall, initial encounter [W19. XXXA]  Laceration of scalp, initial encounter [S01.01XA]  Abnormal CT scan, lumbar spine [R93.7]  Ambulatory dysfunction [R26.2]     UTILIZATION REVIEW CONTACT:  Ethel Eubanks Utilization   Network Utilization Review Department  Phone: [phone]  Fax [phone]  Email: [email or web address]  Contact for approvals/pending authorizations, clinical reviews, and discharge. PHYSICIAN ADVISORY SERVICES:  Medical Necessity Denial & Fizw-wf-Djls Review  Phone: [phone]  Fax: [phone]  Email: [email or web address]     DISCHARGE SUPPORT TEAM:  For Patients Discharge Needs & Updates  Phone: [phone] opt. 2 Fax: [phone]  Email: [email or web address]

## 2023-10-11 NOTE — ASSESSMENT & PLAN NOTE
Patient's status post T12-S1 fusion with bilateral laminectomy and facetectomy in April 2023. Patient reported continuous of back pain since, radiating bilateral legs. Per wife and patient, the pain was disabling, patient mostly resting in bed also with decreased oral intake. Was seen as outpatient by acute pain management and was started on gabapentin that was slowly titrated to 400 mg 4 times a day. Per wife, patient somnolent after gabapentin. Was on steroids before, not currently  Also on medical marijuana  Not on opiates  Because of fall, patient had CT lumbar that showed development of lucency S1 around bilateral screws discerning for loosening. No bone destruction but correlation with infection, clinically   neurosurgery was consulted, recommended upright x-ray lumbar spine. Per neurosurgery, there is a hardware loosening, but there is no movement noted on the XR with bending and there is no spinal instability. No surgery indicated   Patient has milld leukocytosis that might be reactive, has normal procalcitonin. No concern of infection at this point. Patient reported multiple times that his pain is 10 out of 10 however he laid comfortably in bed in no distress. Patient will be discharged on lidocaine patch only, 3 tablets of oxycodone, gabapentin at bedtime also he was advised to take ibuprofen/Denault for his pain. Recommended him to have physical therapy as outpatient as scheduled.   Recommended outpatient pain management

## 2023-10-11 NOTE — NURSING NOTE
This nurse was called to patients room, as PCA said he told her he was nauseous, when this nurse went to the room the patient told me he had thrown up mucous. Pt states he did not eat his lunch, so I checked his BS and it was 254, and PCA documented he ate 75% of his meal. Pt also had an iced tea from home with lots of sugar content. So this result is post prandial.  I told him I could get him some Ginger Ale, however he went on to tell me he is having an anxiety attack and needed medicine. I informed him I would speak with the doctor and get some diet GA in the mean time. As I walked back to the room with his diet GA he was sticking his finger down his throat over the trash can to try and throw up. Then told me he had mucous he couldn't get up. Pt was up/down (lying/sitting) constantly in the bed while this nurse was in the room. Spoke with MD and informed of the situation, agreed to go with the 0.5mg of Klonapin PRN and that should also help the anxiety he feels in his stomach. Gave patient a cup of decaf tea (per his request) and the PRN Klonapin 0.5mg, checked on patient w/in 5 minutes and he is lying in bed texting on his phone, calm cooperative.

## 2023-10-11 NOTE — ASSESSMENT & PLAN NOTE
Patient was noted to have oxygen saturation in the 70s while he was sleeping. Saturating fine at room air . Also patient was snoring. Concerning for MANUEL.

## 2023-10-12 LAB — BACTERIA UR CULT: ABNORMAL

## 2023-10-12 RX ORDER — OXYCODONE HYDROCHLORIDE 5 MG/1
2.5 TABLET ORAL 2 TIMES DAILY PRN
Qty: 3 TABLET | Refills: 0 | Status: SHIPPED | OUTPATIENT
Start: 2023-10-12 | End: 2023-10-13 | Stop reason: SDUPTHER

## 2023-10-12 RX ORDER — GABAPENTIN 400 MG/1
400 CAPSULE ORAL
Qty: 30 CAPSULE | Refills: 0 | Status: SHIPPED | OUTPATIENT
Start: 2023-10-12 | End: 2023-11-11

## 2023-10-12 NOTE — UTILIZATION REVIEW
NOTIFICATION OF ADMISSION DISCHARGE   This is a Notification of Discharge from [facility]. Please be advised that this patient has been discharge from our facility. Below you will find the admission and discharge date and time including the patient’s disposition. UTILIZATION REVIEW CONTACT:  Nilda Kessler  Utilization   Network Utilization Review Department  Phone: [phone] x [phone] carefully listen to the prompts. All voicemails are confidential.  Email: [email or web address]     ADMISSION INFORMATION  PRESENTATION DATE: 10/9/2023 10:24 AM  OBERVATION ADMISSION DATE:   INPATIENT ADMISSION DATE: 10/10/23  3:52 PM   DISCHARGE DATE: 10/11/2023  4:42 PM   DISPOSITION:Home/Self Care    Network Utilization Review Department  ATTENTION: Please call with any questions or concerns to [phone] and carefully listen to the prompts so that you are directed to the right person. All voicemails are confidential.   For Discharge needs, contact Care Management DC Support Team at [phone] opt. [phone]  Send all requests for admission clinical reviews, approved or denied determinations and any other requests to dedicated fax number below belonging to the campus where the patient is receiving treatment.  List of dedicated fax numbers for the Facilities:  [place] DENIALS (Administrative/Medical Necessity) [phone]   DISCHARGE SUPPORT TEAM (Network) [phone] [address] [facility] (Maternity/NICU/Pediatrics) [phone]   [address] [facility] [address] [place] [address] [phone] [address] [facility] [phone]   [address] [place] [address]  [address]  [phone]

## 2023-10-13 RX ORDER — OXYCODONE HYDROCHLORIDE 5 MG/1
2.5 TABLET ORAL 2 TIMES DAILY PRN
Qty: 3 TABLET | Refills: 0 | Status: SHIPPED | OUTPATIENT
Start: 2023-10-13 | End: 2023-10-16

## 2023-10-13 NOTE — CASE MANAGEMENT
Case Management Progress Note    Patient name Grzegorz Delgado  Location /-31 MRN [id number]  : 1955 Date 10/13/2023       LOS (days): 1  Geometric Mean LOS (GMLOS) (days): 3.10  Days to GMLOS:2.1        OBJECTIVE:        Current admission status: Inpatient  Preferred Pharmacy:   [facility].  [address]  [address] [address]  Phone: [phone] Fax: [phone]    Primary Care Provider: Delilah Hall DO    Primary Insurance: [facility]  Secondary Insurance: [facility]    PROGRESS NOTE:    Notification made to OP CM Handoff: [facility] OP CM regarding discharge planning and disposition. Message left for CM to inform of pt's dc to home on 10/11.

## 2023-10-14 LAB
BACTERIA BLD CULT: NORMAL
BACTERIA BLD CULT: NORMAL

## 2023-10-16 PROBLEM — N30.00 ACUTE CYSTITIS WITHOUT HEMATURIA: Status: ACTIVE | Noted: 2023-10-16

## 2023-10-16 RX ORDER — CIPROFLOXACIN 250 MG/1
250 TABLET, FILM COATED ORAL EVERY 12 HOURS SCHEDULED
Qty: 6 TABLET | Refills: 0 | Status: SHIPPED | OUTPATIENT
Start: 2023-10-16 | End: 2023-10-19

## 2023-10-16 NOTE — QUICK NOTE
Patient's UC grew Klebsiella. Called the patient to let him know. He didn't answer.  Called his wife, told her the result and that will order abx for 3 days per sensitivity  Scrip sent to his pharmacy

## 2023-10-17 ENCOUNTER — OFFICE VISIT (OUTPATIENT)
Dept: PHYSICAL THERAPY | Facility: CLINIC | Age: 68
End: 2023-10-17
Payer: COMMERCIAL

## 2023-10-17 DIAGNOSIS — M54.50 CHRONIC BILATERAL LOW BACK PAIN WITHOUT SCIATICA: ICD-10-CM

## 2023-10-17 DIAGNOSIS — M54.16 LUMBAR RADICULOPATHY: ICD-10-CM

## 2023-10-17 DIAGNOSIS — G89.29 CHRONIC BILATERAL LOW BACK PAIN WITHOUT SCIATICA: ICD-10-CM

## 2023-10-17 DIAGNOSIS — M47.816 LUMBAR SPONDYLOSIS: Primary | ICD-10-CM

## 2023-10-17 DIAGNOSIS — Z98.1 STATUS POST LUMBAR SPINAL FUSION: ICD-10-CM

## 2023-10-17 DIAGNOSIS — M48.062 SPINAL STENOSIS OF LUMBAR REGION WITH NEUROGENIC CLAUDICATION: ICD-10-CM

## 2023-10-17 PROCEDURE — 97112 NEUROMUSCULAR REEDUCATION: CPT

## 2023-10-17 PROCEDURE — 97110 THERAPEUTIC EXERCISES: CPT

## 2023-10-17 NOTE — PROGRESS NOTES
Daily Note     Today's date: 10/17/2023  Patient name: Clementina Joseph  : 1955  MRN: [id number]  Referring provider: Herlinda Joy MD  Dx:   Encounter Diagnosis     ICD-10-CM    1. Lumbar spondylosis  M47.816       2. Status post lumbar spinal fusion  Z98.1       3. Lumbar radiculopathy  M54.16       4. Chronic bilateral low back pain without sciatica  M54.50     G89.29       5. Spinal stenosis of lumbar region with neurogenic claudication  M48.062                      Subjective: Patient reports he fell last week and was in the hospital for 2 days. He had fallen because his legs gave out on him and he hit his head. Notes since being out of the hospital he is mainly just tired      Objective: See treatment diary below      Assessment: Patient tolerated treatment fair - he reported fatigue post treatment. He has decreased balance and endurance due to weakness. He will benefit from skilled PT to address impairments and return to PLOF      Plan: Continue per plan of care.       Precautions: posterior fusion L1-S1 on 2023; hx of cancer; fall risk  Functional Limitations: walking, stairs, dressing, ADLs  Impairments: lumbar and hip ROM, BLE strength, TUG time  PacketVideo Code: [email or web address]   POC expiration: 2023      Manuals 8/17 8/24 9/19 9/21  9/28 10/5 10/17     STM lumbar paraspinals sitting  8' 8'          Bilateral hip PROM  4' 4'          Bilateral hip LAD   2'                       Neuro Re-Ed             hooklying TA nv  nv          hooklying / march      10x ea       Standing pball press   10"x10          Std perturbations in romberg    3 min  3 min        STD rows with cues for balance     OTB 2x10 OTB 2x10        TA arms OH      5# 2x10       Iso ADD/ABD with TA      10x3" ea Seated 10"x10 Seated 10"x10     Ther Ex             bike  5' for ROM  nv          LAQ nv  2# 2x10 2x10 ea 2x10 2x10 ea 2x10ea      Seated march HEP            Seated lumbar flexion HEP   3x15" 3x15"  10x 10x     LTR  15ea 10ea          Sit to stand    Post retro mini squat 5x         SKTC             Side stepping    3 laps 10 ft at mirror 3 laps 3 laps 3 laps 3 laps     Standing 3 way hip nv            Standing knee flexion nv      2x10 ea 2x10     walking march     3 laps in // bars  2 laps in // bars 2 laps in // bars     HR    20x 20x 20x 20x + TR seated 20 HR/TR seated     Seated hamstring stretch  20"x2 20"x3 ea 3x15" ea 3x15" ea  3x15" 3x30" ea     Seated 3 way pball rollout   5"x5ea 5x5" 5x5"  5"x5 5"x5     Leg press             Mini squat    At mirrow with B/L UE support 2x10  2x10        Ther Activity             Manual push of therapist in // bars     4 laps 10 ft 3 laps        Fwd step up             Lateral step up             Gait Training                                       Modalities

## 2023-10-19 ENCOUNTER — OFFICE VISIT (OUTPATIENT)
Dept: PHYSICAL THERAPY | Facility: CLINIC | Age: 68
End: 2023-10-19
Payer: COMMERCIAL

## 2023-10-19 DIAGNOSIS — M54.50 CHRONIC BILATERAL LOW BACK PAIN WITHOUT SCIATICA: ICD-10-CM

## 2023-10-19 DIAGNOSIS — M47.816 LUMBAR SPONDYLOSIS: Primary | ICD-10-CM

## 2023-10-19 DIAGNOSIS — G89.29 CHRONIC BILATERAL LOW BACK PAIN WITHOUT SCIATICA: ICD-10-CM

## 2023-10-19 DIAGNOSIS — M54.16 LUMBAR RADICULOPATHY: ICD-10-CM

## 2023-10-19 DIAGNOSIS — Z98.1 STATUS POST LUMBAR SPINAL FUSION: ICD-10-CM

## 2023-10-19 DIAGNOSIS — M48.062 SPINAL STENOSIS OF LUMBAR REGION WITH NEUROGENIC CLAUDICATION: ICD-10-CM

## 2023-10-19 PROCEDURE — 97112 NEUROMUSCULAR REEDUCATION: CPT

## 2023-10-19 PROCEDURE — 97110 THERAPEUTIC EXERCISES: CPT

## 2023-10-19 NOTE — PROGRESS NOTES
Daily Note     Today's date: 10/19/2023  Patient name: Clementina Joseph  : 1955  MRN: [id number]  Referring provider: Herlinda Joy MD  Dx:   Encounter Diagnosis     ICD-10-CM    1. Lumbar spondylosis  M47.816       2. Status post lumbar spinal fusion  Z98.1       3. Lumbar radiculopathy  M54.16       4. Chronic bilateral low back pain without sciatica  M54.50     G89.29       5. Spinal stenosis of lumbar region with neurogenic claudication  M48.062                      Subjective: Patient reports pain in back of both legs today      Objective: See treatment diary below      Assessment: Patient tolerated treatment fair - is fatigued post treatment. Bike for ROM in BLE. Reports pain in waist with standing exercises.  Will re-evaluate next visit to determine further need for skilled PT      Plan: re-evaluation     Precautions: posterior fusion L1-S1 on 2023; hx of cancer; fall risk  Functional Limitations: walking, stairs, dressing, ADLs  Impairments: lumbar and hip ROM, BLE strength, TUG time  TuneCore Code: [email or web address]   POC expiration: 2023      Manuals 8/17 8/24 9/19 9/21  9/28 10/5 10/17 10/19   STM lumbar paraspinals sitting  8' 8'         Bilateral hip PROM  4' 4'         Bilateral hip LAD   2'                     Neuro Re-Ed            hooklying TA nv  nv         hooklying TA / march      10x ea      Standing pball press   10"x10         Std perturbations in romberg    3 min  3 min       STD rows with cues for balance     OTB 2x10 OTB 2x10    OTB 2x10   TA arms OH      5# 2x10      Iso ADD/ABD with TA      10x3" ea Seated 10"x10 Seated 10"x10 Seated 10"x10   Ther Ex            bike  5' for ROM  nv      5' for ROM   LAQ nv  2# 2x10 2x10 ea 2x10 2x10 ea 2x10ea     Seated march HEP           Seated lumbar flexion HEP   3x15" 3x15"  10x 10x 10x   LTR  15ea 10ea         Sit to stand    Post retro mini squat 5x        SKTC            Side stepping    3 laps 10 ft at mirror 3 laps 3 laps 3 laps 3 laps 3 laps   Standing 3 way hip nv           Standing knee flexion nv      2x10 ea 2x10    walking march     3 laps in // bars  2 laps in // bars 2 laps in // bars 2 laps in // bars   HR    20x 20x 20x 20x + TR seated 20 HR/TR seated 20 HR/TR seated   Seated hamstring stretch  20"x2 20"x3 ea 3x15" ea 3x15" ea  3x15" 3x30" ea 3x30" ea   Seated 3 way pball rollout   5"x5ea 5x5" 5x5"  5"x5 5"x5 5"x5   Leg press            Mini squat    At mirrow with B/L UE support 2x10  2x10       Standing hip flex and abd         10ea   Ther Activity            Manual push of therapist in // bars     4 laps 10 ft 3 laps       Fwd step up            Lateral step up            Gait Training                                    Modalities

## 2023-10-24 ENCOUNTER — EVALUATION (OUTPATIENT)
Dept: PHYSICAL THERAPY | Facility: CLINIC | Age: 68
End: 2023-10-24
Payer: COMMERCIAL

## 2023-10-24 DIAGNOSIS — M48.062 SPINAL STENOSIS OF LUMBAR REGION WITH NEUROGENIC CLAUDICATION: ICD-10-CM

## 2023-10-24 DIAGNOSIS — M54.50 CHRONIC BILATERAL LOW BACK PAIN WITHOUT SCIATICA: ICD-10-CM

## 2023-10-24 DIAGNOSIS — Z98.1 STATUS POST LUMBAR SPINAL FUSION: ICD-10-CM

## 2023-10-24 DIAGNOSIS — M47.816 LUMBAR SPONDYLOSIS: Primary | ICD-10-CM

## 2023-10-24 DIAGNOSIS — M54.16 LUMBAR RADICULOPATHY: ICD-10-CM

## 2023-10-24 DIAGNOSIS — G89.29 CHRONIC BILATERAL LOW BACK PAIN WITHOUT SCIATICA: ICD-10-CM

## 2023-10-24 PROCEDURE — 97112 NEUROMUSCULAR REEDUCATION: CPT

## 2023-10-24 PROCEDURE — 97110 THERAPEUTIC EXERCISES: CPT

## 2023-10-24 NOTE — PROGRESS NOTES
PT Re-Evaluation     Today's date: 10/24/2023  Patient name: Benita Wyatt  : 1955  MRN: [id number]  Referring provider: Brianna Rivera MD  Dx:   Encounter Diagnosis     ICD-10-CM    1. Lumbar spondylosis  M47.816       2. Status post lumbar spinal fusion  Z98.1       3. Lumbar radiculopathy  M54.16       4. Chronic bilateral low back pain without sciatica  M54.50     G89.29       5. Spinal stenosis of lumbar region with neurogenic claudication  M48.062                      Assessment  Assessment details: Benita Wyatt is a pleasant 79 y.o. male who presents with low back pain with intermittent bilateral leg pain s/p L1-S1 posterior fusion on 2023. He presents with decreased hip and lumbar mobility and generalized bilateral LE weakness. He demonstrates high risk for falls with TUG time of 40 seconds with SPC. He was educated on use of a walker rather than SPC for safety with walking as he has reported multiple falls in the past year. These signs and symptoms are consistent with Status post lumbar spinal fusion, Chronic bilateral low back pain without sciatica, Spinal stenosis of lumbar region with neurogenic claudication, Lumbar spondylosis, Lumbar radiculopathy. He will benefit from skilled PT to address impairments and return to maximum level of function as well as reduce risk for falls. At this time no referral is necessary based on examination. 10/24/2023: Patient has made progress in BLE strength and lumbar flexion ROM but continues to have pain in lumbar region and BLE. He is limited with ADLs, walking, dressing due to decreased strength and core control. He would benefit from skilled PT to maximize overall function and manage pain.    Impairments: abnormal or restricted ROM, activity intolerance, impaired balance, impaired physical strength, pain with function and poor posture   Prognosis details: Positive prognostic indicators include positive attitude toward recovery, motivated to improve, high self-efficacy, good understanding of condition, realistic expectations. Negative prognostic indicators include chronicity of symptoms, high symptom irritability. Goals  STG to be achieved in 6 weeks  Patient will have improved lumbar ROM to moderate limitation. (Met)  Patient will have improved gross BLE strength to 4/5(good progress)  Patient will have improved TUG time with SPC to 25 seconds(not assessed)  Patient will be able to walk household distances with less difficulty(some progress)  Patient will be independent with HEP. LTG to be achieved in 12 weeks  Patient will be able to walk community distances with less difficulty  Patient will be able to ascend/descend stairs with less difficulty (not assessed)  Patient will be able to dress independently (good progress)      Plan  Patient would benefit from: skilled physical therapy  Planned modality interventions: cryotherapy and thermotherapy: hydrocollator packs  Planned therapy interventions: manual therapy, joint mobilization, neuromuscular re-education, therapeutic exercise, therapeutic activities, strengthening, stretching, home exercise program, gait training, functional ROM exercises, body mechanics training, balance, flexibility and patient education  Frequency: 2x week  Plan of Care beginning date: 10/24/2023  Plan of Care expiration date: 12/19/2023  Treatment plan discussed with: patient        Subjective Evaluation    History of Present Illness  Date of surgery: 4/11/2023  Mechanism of injury: Patient reports back pain and bilateral leg pain for at least 2 years. He had tried conservative care like injections, etc prior to surgery. He had a posterior fusion of L1-S1 on 4/11/2023. Since surgery, he has had so much back pain and intermittent pain in bilateral legs. Patient did have home PT up until 2 weeks ago for 5-6 visit. Has difficulty with dressing lower body, walking, going up and down the stairs, ADLs.  Notes falling frequently. 10/24/2023: Patient continues to have low back pain with pain down back of legs.  He uses cane and walker to walk but has difficulty due to LBP Continued difficulty with dressing, ADLs, etc.   Patient Goals  Patient goal: Patient would like to be able to manage back and leg pain and improve overall function         Objective     Tenderness     Additional Tenderness Details  TTP to thoracolumbar paraspinals    Active Range of Motion     Lumbar   Flexion:  Restriction level: maximal  Extension:  Restriction level: minimal  Left Hip   External rotation (90/90): 30 degrees with pain  Internal rotation (90/90): 20 degrees with pain    Right Hip   External rotation (90/90): 22 degrees with pain  Internal rotation (90/90): 20 degrees with pain    Strength/Myotome Testing     Left Hip   Planes of Motion   Flexion: 4-  Abduction: 4-  Adduction: 4+    Right Hip   Planes of Motion   Flexion: 4-  Abduction: 4-  Adduction: 4+    Left Knee   Flexion: 4-  Extension: 4    Right Knee   Flexion: 4-  Extension: 4    Left Ankle/Foot   Dorsiflexion: 4-    Right Ankle/Foot   Dorsiflexion: 4-    Functional Assessment        Comments  TUG w/ SPC on 8/17/2023: 40 seconds             Precautions: posterior fusion L1-S1 on 4/11/2023; hx of cancer; fall risk  Functional Limitations: walking, stairs, dressing, ADLs  Impairments: lumbar and hip ROM, BLE strength, TUG time  [facility] Code: [email or web address]   POC expiration: 11/9/2023    Manuals 9/21  9/28 10/5 10/17 10/19 10/24    STM lumbar paraspinals sitting           Bilateral hip PROM           Bilateral hip LAD                      Neuro Re-Ed           hooklying TA           hooklying TA w/ march   10x ea        Standing pball press           Std perturbations in romberg 3 min  3 min         STD rows with cues for balance  OTB 2x10 OTB 2x10    OTB 2x10 nv    TA arms OH   5# 2x10        Iso ADD/ABD with TA   10x3" ea Seated 10"x10 Seated 10"x10 Seated 10"x10 Seated 10"x10    Ther Ex bike      5' for ROM nv    LAQ 2x10 ea 2x10 2x10 ea 2x10ea       Seated march           Seated lumbar flexion 3x15" 3x15"  10x 10x 10x 10x    LTR           Sit to stand Post retro mini squat 5x          SKTC           Side stepping 3 laps 10 ft at mirror 3 laps 3 laps 3 laps 3 laps 3 laps nv    Standing 3 way hip           Standing knee flexion    2x10 ea 2x10  nv    walking march  3 laps in // bars  2 laps in // bars 2 laps in // bars 2 laps in // bars nv    HR 20x 20x 20x 20x + TR seated 20 HR/TR seated 20 HR/TR seated 20 HR/TR seated    Seated hamstring stretch 3x15" ea 3x15" ea  3x15" 3x30" ea 3x30" ea 3x30" ea    Seated 3 way pball rollout 5x5" 5x5"  5"x5 5"x5 5"x5 5"x5    Leg press           Mini squat At mirrow with B/L UE support 2x10  2x10     nv    Standing hip flex and abd      10ea nv    Ther Activity           Manual push of therapist in // bars  4 laps 10 ft 3 laps         Fwd step up           Lateral step up           Gait Training                                 Modalities

## 2023-10-26 ENCOUNTER — OFFICE VISIT (OUTPATIENT)
Dept: PHYSICAL THERAPY | Facility: CLINIC | Age: 68
End: 2023-10-26
Payer: COMMERCIAL

## 2023-10-26 DIAGNOSIS — M47.816 LUMBAR SPONDYLOSIS: Primary | ICD-10-CM

## 2023-10-26 DIAGNOSIS — M54.16 LUMBAR RADICULOPATHY: ICD-10-CM

## 2023-10-26 DIAGNOSIS — G89.29 CHRONIC BILATERAL LOW BACK PAIN WITHOUT SCIATICA: ICD-10-CM

## 2023-10-26 DIAGNOSIS — M48.062 SPINAL STENOSIS OF LUMBAR REGION WITH NEUROGENIC CLAUDICATION: ICD-10-CM

## 2023-10-26 DIAGNOSIS — Z98.1 STATUS POST LUMBAR SPINAL FUSION: ICD-10-CM

## 2023-10-26 DIAGNOSIS — M54.50 CHRONIC BILATERAL LOW BACK PAIN WITHOUT SCIATICA: ICD-10-CM

## 2023-10-26 PROCEDURE — 97110 THERAPEUTIC EXERCISES: CPT

## 2023-10-26 PROCEDURE — 97112 NEUROMUSCULAR REEDUCATION: CPT

## 2023-10-26 NOTE — PROGRESS NOTES
Daily Note     Today's date: 10/26/2023  Patient name: Yamileth Cano  : 1955  MRN: [id number]  Referring provider: Meet Zamarripa MD  Dx:   Encounter Diagnosis     ICD-10-CM    1. Lumbar spondylosis  M47.816       2. Status post lumbar spinal fusion  Z98.1       3. Lumbar radiculopathy  M54.16       4. Chronic bilateral low back pain without sciatica  M54.50     G89.29       5. Spinal stenosis of lumbar region with neurogenic claudication  M48.062           Start Time: 0815  Stop Time: 09  Total time in clinic (min): 45 minutes    Subjective: Patient reports he is in a lot of pain 10/10 today. Yesterday was a good day, he donned bilat legs brace hat was helping with pain; however by By 3-4am he starting to have pain in LBP and bilat legs that has remained into today. Objective: See treatment diary below      Assessment: Tolerated treatment well. Patient presents with RW, antalgic gait and significant pain to lower back and bilat legs today. Session focused on pain management- seated and standing exercises with RW (MHP during seated exercise) to address pain with good tolerance to activity. Patient would benefit from further PT to address pain, limitations and restore functional mobility. Plan: Continue per plan of care.       Precautions: posterior fusion L1-S1 on 2023; hx of cancer; fall risk  Functional Limitations: walking, stairs, dressing, ADLs  Impairments: lumbar and hip ROM, BLE strength, TUG time  [facility] Code: [email or web address]   POC expiration: 2023    Manuals 9/21  9/28 10/5 10/17 10/19 10/24 10/26   STM lumbar paraspinals sitting           Bilateral hip PROM           Bilateral hip LAD                      Neuro Re-Ed           hooklying TA           hooklying TA / march   10x ea        Standing pball press           Std perturbations in romberg 3 min  3 min         STD rows with cues for balance  OTB 2x10 OTB 2x10    OTB 2x10 nv OTB x15   TA arms OH   5# 2x10        Iso ADD/ABD with TA   10x3" ea Seated 10"x10 Seated 10"x10 Seated 10"x10 Seated 10"x10 Seated 10"x20   Ther Ex           bike      5' for ROM nv Nv  P!   LAQ 2x10 ea 2x10 2x10 ea 2x10ea       Seated march           Seated lumbar flexion 3x15" 3x15"  10x 10x 10x 10x 25x  RW   LTR           Sit to stand Post retro mini squat 5x          SKTC           Side stepping 3 laps 10 ft at mirror 3 laps 3 laps 3 laps 3 laps 3 laps nv    Standing 3 way hip           Standing knee flexion    2x10 ea 2x10  nv 10 ea RW   walking march  3 laps in // bars  2 laps in // bars 2 laps in // bars 2 laps in // bars nv nv   HR 20x 20x 20x 20x + TR seated 20 HR/TR seated 20 HR/TR seated 20 HR/TR seated 20 HR/TR seated   Seated hamstring stretch 3x15" ea 3x15" ea  3x15" 3x30" ea 3x30" ea 3x30" ea 3x30" ea   Seated 3 way pball rollout 5x5" 5x5"  5"x5 5"x5 5"x5 5"x5 5"x5   Leg press           Mini squat At mirrow with B/L UE support 2x10  2x10     nv 2x10  RW   Standing hip flex and abd      10ea nv Flex/ext  RW 10 ea  B LE   Ther Activity           Manual push of therapist in // bars  4 laps 10 ft 3 laps         Fwd step up           Lateral step up           Gait Training                                 Modalities

## 2023-10-31 ENCOUNTER — APPOINTMENT (OUTPATIENT)
Dept: PHYSICAL THERAPY | Facility: CLINIC | Age: 68
End: 2023-10-31
Payer: COMMERCIAL

## 2023-11-01 ENCOUNTER — TELEPHONE (OUTPATIENT)
Dept: PSYCHIATRY | Facility: CLINIC | Age: 68
End: 2023-11-01

## 2023-11-01 NOTE — TELEPHONE ENCOUNTER
Contacted patient in regards to routine referral and placing patient on proper wait list. Lvm for patient to contact intake department.

## 2023-11-02 ENCOUNTER — APPOINTMENT (OUTPATIENT)
Dept: PHYSICAL THERAPY | Facility: CLINIC | Age: 68
End: 2023-11-02
Payer: COMMERCIAL

## 2023-11-07 ENCOUNTER — OFFICE VISIT (OUTPATIENT)
Dept: PHYSICAL THERAPY | Facility: CLINIC | Age: 68
End: 2023-11-07
Payer: COMMERCIAL

## 2023-11-07 DIAGNOSIS — M54.50 CHRONIC BILATERAL LOW BACK PAIN WITHOUT SCIATICA: ICD-10-CM

## 2023-11-07 DIAGNOSIS — G89.29 CHRONIC BILATERAL LOW BACK PAIN WITHOUT SCIATICA: ICD-10-CM

## 2023-11-07 DIAGNOSIS — M54.16 LUMBAR RADICULOPATHY: ICD-10-CM

## 2023-11-07 DIAGNOSIS — Z98.1 STATUS POST LUMBAR SPINAL FUSION: ICD-10-CM

## 2023-11-07 DIAGNOSIS — M48.062 SPINAL STENOSIS OF LUMBAR REGION WITH NEUROGENIC CLAUDICATION: ICD-10-CM

## 2023-11-07 DIAGNOSIS — M47.816 LUMBAR SPONDYLOSIS: Primary | ICD-10-CM

## 2023-11-07 PROCEDURE — 97110 THERAPEUTIC EXERCISES: CPT

## 2023-11-07 NOTE — PLAN OF CARE
Problem: PHYSICAL THERAPY ADULT  Goal: Performs mobility at highest level of function for planned discharge setting  See evaluation for individualized goals  Description: Treatment/Interventions: Functional transfer training, LE strengthening/ROM, Elevations, Therapeutic exercise, Endurance training, Patient/family training, Equipment eval/education, Bed mobility, Gait training  Equipment Recommended: Nataliia Knox, Wheelchair       See flowsheet documentation for full assessment, interventions and recommendations  Outcome: Progressing  Note: Prognosis: Guarded  Problem List: Decreased strength, Decreased endurance, Impaired balance, Decreased mobility, Impaired judgement, Pain  Assessment: Pt agrees to PT treatment and is cooperative after motivation and x2 attempts  Progress noted as pt is able to ambulate increased distance with min A x1  Requires education on spinal precautions for log rolling in bed  Limited insight into deficits as pt continues to want to ambulate while eyes closed (due to pain) and BLE knees demonstrating soft buckling  Pt requires WC transfer back to bed due to gait degradation and pain  Will continue to benefit from ongoing skilled PT to maximize his functional mobility and increase his level of independence  Barriers to Discharge: Inaccessible home environment, Decreased caregiver support          See flowsheet documentation for full assessment  Cerebral Angiogram  -cbc, bmp, type and screen, pt/ptt/inr @ PST  -medical evaluation   -obtain ENT evaluation note  -preop instructions provided  -continue A/C  -ABO on admit  -ucg on admit

## 2023-11-07 NOTE — PROGRESS NOTES
Daily Note     Today's date: 2023  Patient name: Lynsey Muñoz  : 1955  MRN: [id number]  Referring provider: Katerine Leach MD  Dx:   Encounter Diagnosis     ICD-10-CM    1. Lumbar spondylosis  M47.816       2. Status post lumbar spinal fusion  Z98.1       3. Lumbar radiculopathy  M54.16       4. Chronic bilateral low back pain without sciatica  M54.50     G89.29       5. Spinal stenosis of lumbar region with neurogenic claudication  M48.062                      Subjective: Patient reports having a lot of pain in low back and legs. Notes having "sciatic braces" for legs that do help him. Objective: See treatment diary below      Assessment: Patient tolerated treatment fair. He noted relief with 3 way ball rollout for mobility - educated him on using this when having pain at home as he has a physioball at home. He was fatigued with standing exercises. Bike performed for mobility. Will benefit from skilled PT to address impairments and return to PLOF      Plan: Continue per plan of care.       Precautions: posterior fusion L1-S1 on 2023; hx of cancer; fall risk  Functional Limitations: walking, stairs, dressing, ADLs  Impairments: lumbar and hip ROM, BLE strength, TUG time  [facility] Code: [email or web address]   POC expiration: 2023    Manuals 10/5 10/17 10/19 10/24 10/26 11/7     STM lumbar paraspinals sitting           Bilateral hip PROM           Bilateral hip LAD                      Neuro Re-Ed           hooklying TA           hooklying TA / march           Standing pball press           Std perturbations in romberg           STD rows with cues for balance    OTB 2x10 nv OTB x15 OTB 20x     TA arms OH           Iso ADD/ABD with TA Seated 10"x10 Seated 10"x10 Seated 10"x10 Seated 10"x10 Seated 10"x20 Seated 10"x10     Ther Ex           bike   5' for ROM nv Nv  P! 5'     LAQ 2x10ea          Seated march           Seated lumbar flexion 10x 10x 10x 10x 25x  RW      LTR           Sit to stand SKTC           Side stepping 3 laps 3 laps 3 laps nv       Standing knee flexion 2x10 ea 2x10  nv 10 ea RW      walking march 2 laps in // bars 2 laps in // bars 2 laps in // bars nv nv Standing 20x     HR 20x + TR seated 20 HR/TR seated 20 HR/TR seated 20 HR/TR seated 20 HR/TR seated 20 HR/TR seated     Seated hamstring stretch 3x15" 3x30" ea 3x30" ea 3x30" ea 3x30" ea      Seated 3 way pball rollout 5"x5 5"x5 5"x5 5"x5 5"x5 5"x5 ea     Leg press           Mini squat    nv 2x10  RW      Standing hip flex and abd   10ea nv Flex/ext  RW 10 ea  B LE 20 abd ea     Ther Activity           Manual push of therapist in // bars            Fwd step up           Lateral step up           Gait Training                                 Modalities

## 2023-11-09 ENCOUNTER — OFFICE VISIT (OUTPATIENT)
Dept: PHYSICAL THERAPY | Facility: CLINIC | Age: 68
End: 2023-11-09
Payer: COMMERCIAL

## 2023-11-09 DIAGNOSIS — M48.062 SPINAL STENOSIS OF LUMBAR REGION WITH NEUROGENIC CLAUDICATION: ICD-10-CM

## 2023-11-09 DIAGNOSIS — Z98.1 STATUS POST LUMBAR SPINAL FUSION: ICD-10-CM

## 2023-11-09 DIAGNOSIS — M54.16 LUMBAR RADICULOPATHY: ICD-10-CM

## 2023-11-09 DIAGNOSIS — M54.50 CHRONIC BILATERAL LOW BACK PAIN WITHOUT SCIATICA: ICD-10-CM

## 2023-11-09 DIAGNOSIS — M47.816 LUMBAR SPONDYLOSIS: Primary | ICD-10-CM

## 2023-11-09 DIAGNOSIS — G89.29 CHRONIC BILATERAL LOW BACK PAIN WITHOUT SCIATICA: ICD-10-CM

## 2023-11-09 PROCEDURE — 97110 THERAPEUTIC EXERCISES: CPT

## 2023-11-09 PROCEDURE — 97112 NEUROMUSCULAR REEDUCATION: CPT

## 2023-11-09 NOTE — PROGRESS NOTES
Daily Note     Today's date: 2023  Patient name: Cb Guerra  : 1955  MRN: [id number]  Referring provider: Francisca Gaucher, MD  Dx:   Encounter Diagnosis     ICD-10-CM    1. Lumbar spondylosis  M47.816       2. Status post lumbar spinal fusion  Z98.1       3. Lumbar radiculopathy  M54.16       4. Chronic bilateral low back pain without sciatica  M54.50     G89.29       5. Spinal stenosis of lumbar region with neurogenic claudication  M48.062                      Subjective: Patient reports pain in low back      Objective: See treatment diary below      Assessment: Patient tolerated treatment fair. Progressed exercises with ankle weight resistance today. He is challenged by standing unsupported due to decreased balance and LBP. Will benefit from skilled PT to address impairments and return to maximum level of function      Plan: Continue per plan of care.       Precautions: posterior fusion L1-S1 on 2023; hx of cancer; fall risk  Functional Limitations: walking, stairs, dressing, ADLs  Impairments: lumbar and hip ROM, BLE strength, TUG time  [employer] Code: [email or web address]   POC expiration: 2023    Manuals 10/5 10/17 10/19 10/24 10/26 11/7 11/9    STM lumbar paraspinals sitting           Bilateral hip PROM           Bilateral hip LAD                      Neuro Re-Ed           hooklying TA           hooklying TA w/ march           Standing pball press           Std perturbations in romberg           STD rows with cues for balance    OTB 2x10 nv OTB x15 OTB 20x GTB 20x    Standing BLE stance no BUE support       3x30"    Iso ADD/ABD with TA Seated 10"x10 Seated 10"x10 Seated 10"x10 Seated 10"x10 Seated 10"x20 Seated 10"x10 Seated 10"x10    Ther Ex           bike   5' for ROM nv Nv  P! 5' 5'    LAQ 2x10ea      5# 2x10    Seated march           Seated lumbar flexion 10x 10x 10x 10x 25x  RW      LTR           Sit to stand           SKTC           Side stepping 3 laps 3 laps 3 laps nv       Standing knee flexion 2x10 ea 2x10  nv 10 ea RW      walking march 2 laps in // bars 2 laps in // bars 2 laps in // bars nv nv Standing 20x Standing 1.5# 20x    HR 20x + TR seated 20 HR/TR seated 20 HR/TR seated 20 HR/TR seated 20 HR/TR seated 20 HR/TR seated 20 HR/TR seated    Seated hamstring stretch 3x15" 3x30" ea 3x30" ea 3x30" ea 3x30" ea  3x30"    Seated 3 way pball rollout 5"x5 5"x5 5"x5 5"x5 5"x5 5"x5 ea 5"x5ea    Leg press           Mini squat    nv 2x10  RW  20x    Standing hip flex and abd   10ea nv Flex/ext  RW 10 ea  B LE 20 abd ea 1.5# abd, ext 10ea    Ther Activity           Manual push of therapist in // bars            Fwd step up           Lateral step up           Gait Training                                 Modalities

## 2023-11-14 ENCOUNTER — OFFICE VISIT (OUTPATIENT)
Dept: PHYSICAL THERAPY | Facility: CLINIC | Age: 68
End: 2023-11-14
Payer: COMMERCIAL

## 2023-11-14 DIAGNOSIS — G89.29 CHRONIC BILATERAL LOW BACK PAIN WITHOUT SCIATICA: ICD-10-CM

## 2023-11-14 DIAGNOSIS — Z98.1 STATUS POST LUMBAR SPINAL FUSION: ICD-10-CM

## 2023-11-14 DIAGNOSIS — M47.816 LUMBAR SPONDYLOSIS: Primary | ICD-10-CM

## 2023-11-14 DIAGNOSIS — M48.062 SPINAL STENOSIS OF LUMBAR REGION WITH NEUROGENIC CLAUDICATION: ICD-10-CM

## 2023-11-14 DIAGNOSIS — M54.50 CHRONIC BILATERAL LOW BACK PAIN WITHOUT SCIATICA: ICD-10-CM

## 2023-11-14 DIAGNOSIS — M54.16 LUMBAR RADICULOPATHY: ICD-10-CM

## 2023-11-14 PROCEDURE — 97110 THERAPEUTIC EXERCISES: CPT

## 2023-11-14 PROCEDURE — 97112 NEUROMUSCULAR REEDUCATION: CPT

## 2023-11-14 NOTE — PROGRESS NOTES
Daily Note     Today's date: 2023  Patient name: Benita Wyatt  : 1955  MRN: [id number]  Referring provider: Brianna Rivera MD  Dx:   Encounter Diagnosis     ICD-10-CM    1. Lumbar spondylosis  M47.816       2. Status post lumbar spinal fusion  Z98.1       3. Lumbar radiculopathy  M54.16       4. Chronic bilateral low back pain without sciatica  M54.50     G89.29       5. Spinal stenosis of lumbar region with neurogenic claudication  M48.062                      Subjective: Patient reports pain in low back. Notes he has been doing exercises at home      Objective: See treatment diary below      Assessment: Patient tolerated treatment fair. Is challenged by sidestepping without use of parallel bars. Notes muscle fatigue with hip abduction. Will benefit from skilled PT to address impairments and return to maximum level of function      Plan: Continue per plan of care.       Precautions: posterior fusion L1-S1 on 2023; hx of cancer; fall risk  Functional Limitations: walking, stairs, dressing, ADLs  Impairments: lumbar and hip ROM, BLE strength, TUG time  [employer] Code: [email or web address]   POC expiration: 2023    Manuals 10/26 11/7 11/9 11/14      STM lumbar paraspinals sitting          Bilateral hip PROM          Bilateral hip LAD                    Neuro Re-Ed          hooklying TA          hooklying TA / march          Standing pball press          Std perturbations in romberg          STD rows with cues for balance  OTB x15 OTB 20x GTB 20x       Standing BLE stance no BUE support   3x30"       Iso ADD/ABD with TA Seated 10"x20 Seated 10"x10 Seated 10"x10 Seated 10"x10 ea      Ther Ex          bike Nv  P! 5' 5' 6'      LAQ   5# 2x10       Seated march          Seated lumbar flexion 25x  RW         LTR          Sit to stand          SKTC          Side stepping    3 laps      Standing knee flexion 10 ea RW         walking march nv Standing 20x Standing 1.5# 20x Standing 1.5# 2x20      HR 20 HR/TR seated 20 HR/TR seated 20 HR/TR seated       Seated hamstring stretch 3x30" ea  3x30" 3x30"      Seated 3 way pball rollout 5"x5 5"x5 ea 5"x5ea 5x5"ea      Leg press          Mini squat 2x10  RW  20x       Standing hip flex and abd Flex/ext  RW 10 ea  B LE 20 abd ea 1.5# abd, ext 10ea 1.5# 1abd, ext 10 ea      Ther Activity          Manual push of therapist in // bars           Fwd step up          Lateral step up          Gait Training                              Modalities

## 2023-11-16 ENCOUNTER — OFFICE VISIT (OUTPATIENT)
Dept: PHYSICAL THERAPY | Facility: CLINIC | Age: 68
End: 2023-11-16
Payer: COMMERCIAL

## 2023-11-16 DIAGNOSIS — M54.16 LUMBAR RADICULOPATHY: ICD-10-CM

## 2023-11-16 DIAGNOSIS — G89.29 CHRONIC BILATERAL LOW BACK PAIN WITHOUT SCIATICA: ICD-10-CM

## 2023-11-16 DIAGNOSIS — M47.816 LUMBAR SPONDYLOSIS: Primary | ICD-10-CM

## 2023-11-16 DIAGNOSIS — M48.062 SPINAL STENOSIS OF LUMBAR REGION WITH NEUROGENIC CLAUDICATION: ICD-10-CM

## 2023-11-16 DIAGNOSIS — M54.50 CHRONIC BILATERAL LOW BACK PAIN WITHOUT SCIATICA: ICD-10-CM

## 2023-11-16 DIAGNOSIS — Z98.1 STATUS POST LUMBAR SPINAL FUSION: ICD-10-CM

## 2023-11-16 PROCEDURE — 97112 NEUROMUSCULAR REEDUCATION: CPT

## 2023-11-16 PROCEDURE — 97110 THERAPEUTIC EXERCISES: CPT

## 2023-11-16 NOTE — PROGRESS NOTES
Daily Note     Today's date: 2023  Patient name: Campos Magdaleno  : 1955  MRN: [id number]  Referring provider: Carmencita Koyanagi, MD  Dx:   Encounter Diagnosis     ICD-10-CM    1. Lumbar spondylosis  M47.816       2. Status post lumbar spinal fusion  Z98.1       3. Lumbar radiculopathy  M54.16       4. Chronic bilateral low back pain without sciatica  M54.50     G89.29       5. Spinal stenosis of lumbar region with neurogenic claudication  M48.062                      Subjective: Patient reports pain in low back. Notes being tired this morning      Objective: See treatment diary below      Assessment: Patient tolerated treatment fair. Education on use of walker instead of SPC due to unsteadiness when walking with SPC and LBP. Limited with exercises today due to fatigued and being "tired". Plan: Continue per plan of care.       Precautions: posterior fusion L1-S1 on 2023; hx of cancer; fall risk  Functional Limitations: walking, stairs, dressing, ADLs  Impairments: lumbar and hip ROM, BLE strength, TUG time  [employer] Code: [email or web address]   POC expiration: 2023    Manuals 10/26 11/7 11/9 11/14 11/16     STM lumbar paraspinals sitting          Bilateral hip PROM          Bilateral hip LAD                    Neuro Re-Ed          hooklying TA          hooklying TA w/ march          Standing pball press          Std perturbations in romberg          STD rows with cues for balance  OTB x15 OTB 20x GTB 20x       Standing BLE stance no BUE support   3x30"       Iso ADD/ABD with TA Seated 10"x20 Seated 10"x10 Seated 10"x10 Seated 10"x10 ea Seated 10"x10 ea     Ther Ex          bike Nv  P! 5' 5' 6'      LAQ   5# 2x10       Seated march          Seated lumbar flexion 25x  RW         LTR          Sit to stand          SKTC          Side stepping    3 laps      Standing knee flexion 10 ea RW         walking march nv Standing 20x Standing 1.5# 20x Standing 1.5# 2x20 Standing 1.5# 2x20     HR 20 HR/TR seated 20 HR/TR seated 20 HR/TR seated       Seated hamstring stretch 3x30" ea  3x30" 3x30"      Seated 3 way pball rollout 5"x5 5"x5 ea 5"x5ea 5x5"ea 5x5" ea     Leg press          Mini squat 2x10  RW  20x       Standing hip flex and abd Flex/ext  RW 10 ea  B LE 20 abd ea 1.5# abd, ext 10ea 1.5# 1abd, ext 10 ea 1.5# abd, ext 10 ea     Ther Activity          Manual push of therapist in // bars           Fwd step up          Lateral step up          Gait Training                              Modalities

## 2023-11-21 ENCOUNTER — OFFICE VISIT (OUTPATIENT)
Dept: PHYSICAL THERAPY | Facility: CLINIC | Age: 68
End: 2023-11-21
Payer: COMMERCIAL

## 2023-11-21 DIAGNOSIS — M47.816 LUMBAR SPONDYLOSIS: Primary | ICD-10-CM

## 2023-11-21 DIAGNOSIS — M54.16 LUMBAR RADICULOPATHY: ICD-10-CM

## 2023-11-21 DIAGNOSIS — Z98.1 STATUS POST LUMBAR SPINAL FUSION: ICD-10-CM

## 2023-11-21 DIAGNOSIS — M48.062 SPINAL STENOSIS OF LUMBAR REGION WITH NEUROGENIC CLAUDICATION: ICD-10-CM

## 2023-11-21 PROCEDURE — 97110 THERAPEUTIC EXERCISES: CPT

## 2023-11-21 PROCEDURE — 97112 NEUROMUSCULAR REEDUCATION: CPT

## 2023-11-21 NOTE — PROGRESS NOTES
Daily Note     Today's date: 2023  Patient name: Lisbet Gutierrez  : 1955  MRN: [id number]  Referring provider: Lucio Ford MD  Dx:   Encounter Diagnosis     ICD-10-CM    1. Lumbar spondylosis  M47.816       2. Status post lumbar spinal fusion  Z98.1       3. Lumbar radiculopathy  M54.16       4. Spinal stenosis of lumbar region with neurogenic claudication  M48.062                        Subjective: Patient reports pain in low back      Objective: See treatment diary below      Assessment: Patient tolerated treatment fair. Ambulated w/ [address] - unsteady with walking with [address] - education on using walker instead for safety. Will benefit from skilled PT to address impairments and return to maximum level of function      Plan: Continue per plan of care.       Precautions: posterior fusion L1-S1 on 2023; hx of cancer; fall risk  Functional Limitations: walking, stairs, dressing, ADLs  Impairments: lumbar and hip ROM, BLE strength, TUG time  [employer] Code: [email or web address]   POC expiration: 2023    Manuals 10/26 11/7 11/9 11/14 11/16 11/21    STM lumbar paraspinals sitting          Bilateral hip PROM          Bilateral hip LAD                    Neuro Re-Ed          hooklying TA          hooklying TA w/ march          Standing pball press          Std perturbations in romberg          STD rows with cues for balance  OTB x15 OTB 20x GTB 20x   nv    Standing BLE stance no BUE support   3x30"   nv    Iso ADD/ABD with TA Seated 10"x20 Seated 10"x10 Seated 10"x10 Seated 10"x10 ea Seated 10"x10 ea Seated 10"x10    Ther Ex          bike Nv  P! 5' 5' 6'  6'    LAQ   5# 2x10       Seated march          Seated lumbar flexion 25x  RW     20x    LTR          Sit to stand          SKTC          Side stepping    3 laps  3 laps    Standing knee flexion 10 ea RW         walking march nv Standing 20x Standing 1.5# 20x Standing 1.5# 2x20 Standing 1.5# 2x20 Standing 1.5# 2x20    HR 20 HR/TR seated 20 HR/TR seated 20 HR/TR seated       Seated hamstring stretch 3x30" ea  3x30" 3x30"      Seated 3 way pball rollout 5"x5 5"x5 ea 5"x5ea 5x5"ea 5x5" ea 5"x5 ea    Leg press          Mini squat 2x10  RW  20x       Standing hip flex and abd Flex/ext  RW 10 ea  B LE 20 abd ea 1.5# abd, ext 10ea 1.5# 1abd, ext 10 ea 1.5# abd, ext 10 ea 1.5# abd    Ther Activity          Manual push of therapist in // bars           Fwd step up          Lateral step up          Gait Training                              Modalities

## 2023-11-24 ENCOUNTER — APPOINTMENT (OUTPATIENT)
Dept: PHYSICAL THERAPY | Facility: CLINIC | Age: 68
End: 2023-11-24
Payer: COMMERCIAL

## 2023-11-28 ENCOUNTER — APPOINTMENT (OUTPATIENT)
Dept: PHYSICAL THERAPY | Facility: CLINIC | Age: 68
End: 2023-11-28
Payer: COMMERCIAL

## 2023-11-30 ENCOUNTER — EVALUATION (OUTPATIENT)
Dept: PHYSICAL THERAPY | Facility: CLINIC | Age: 68
End: 2023-11-30
Payer: COMMERCIAL

## 2023-11-30 DIAGNOSIS — Z98.1 STATUS POST LUMBAR SPINAL FUSION: ICD-10-CM

## 2023-11-30 DIAGNOSIS — M47.816 LUMBAR SPONDYLOSIS: Primary | ICD-10-CM

## 2023-11-30 DIAGNOSIS — M48.062 SPINAL STENOSIS OF LUMBAR REGION WITH NEUROGENIC CLAUDICATION: ICD-10-CM

## 2023-11-30 DIAGNOSIS — M54.16 LUMBAR RADICULOPATHY: ICD-10-CM

## 2023-11-30 DIAGNOSIS — M54.50 CHRONIC BILATERAL LOW BACK PAIN WITHOUT SCIATICA: ICD-10-CM

## 2023-11-30 DIAGNOSIS — G89.29 CHRONIC BILATERAL LOW BACK PAIN WITHOUT SCIATICA: ICD-10-CM

## 2023-11-30 PROCEDURE — 97112 NEUROMUSCULAR REEDUCATION: CPT

## 2023-11-30 PROCEDURE — 97110 THERAPEUTIC EXERCISES: CPT

## 2023-11-30 NOTE — PROGRESS NOTES
Daily Note     Today's date: 2023  Patient name: Lazaro Jessica  : 1955  MRN: [id number]  Referring provider: Jon Mcneal MD  Dx:   Encounter Diagnosis     ICD-10-CM    1. Lumbar spondylosis  M47.816       2. Status post lumbar spinal fusion  Z98.1       3. Lumbar radiculopathy  M54.16       4. Spinal stenosis of lumbar region with neurogenic claudication  M48.062       5. Chronic bilateral low back pain without sciatica  M54.50     G89.29                        Subjective: Patient reports he fell yesterday. Denies hitting his head      Objective: See treatment diary below      Assessment: Patient tolerated treatment fair. Observed improved ambulation with SPC today. Verbal cues required for exercise technique.  Will perform re-evaluation next visit to determine further need for skilled PT      Plan: re-evaluation     Precautions: posterior fusion L1-S1 on 2023; hx of cancer; fall risk  Functional Limitations: walking, stairs, dressing, ADLs  Impairments: lumbar and hip ROM, BLE strength, TUG time  [facility] [employer] Code: [email or web address]   POC expiration: 2023    Manuals 10/26 11/7 11/9 11/14 11/16 11/21 11/30   STM lumbar paraspinals sitting          Bilateral hip PROM          Bilateral hip LAD                    Neuro Re-Ed          hooklying TA          hooklying TA / march          Standing pball press          Std perturbations in romberg          STD rows with cues for balance  OTB x15 OTB 20x GTB 20x   nv    Standing BLE stance no BUE support   3x30"   nv    Iso ADD/ABD with TA Seated 10"x20 Seated 10"x10 Seated 10"x10 Seated 10"x10 ea Seated 10"x10 ea Seated 10"x10 Seated 10"X10   Ther Ex          bike Nv  P! 5' 5' 6'  6' 6'   LAQ   5# 2x10    5# x20   Seated march          Seated lumbar flexion 25x  RW     20x 10x   LTR          Sit to stand          SKTC          Side stepping    3 laps  3 laps 3 laps 1.5#   Standing knee flexion 10 ea RW         walking march nv Standing 20x Standing 1.5# 20x Standing 1.5# 2x20 Standing 1.5# 2x20 Standing 1.5# 2x20 Standing 1.5# 2x20   HR 20 HR/TR seated 20 HR/TR seated 20 HR/TR seated       Seated hamstring stretch 3x30" ea  3x30" 3x30"      Seated 3 way pball rollout 5"x5 5"x5 ea 5"x5ea 5x5"ea 5x5" ea 5"x5 ea 5"x5ea   Leg press          Mini squat 2x10  RW  20x       Standing hip flex and abd Flex/ext  RW 10 ea  B LE 20 abd ea 1.5# abd, ext 10ea 1.5# 1abd, ext 10 ea 1.5# abd, ext 10 ea 1.5# abd 1.5# abd 20ea   Ther Activity          Manual push of therapist in // bars           Fwd step up          Lateral step up          Gait Training                              Modalities

## 2023-12-04 ENCOUNTER — DOCUMENTATION (OUTPATIENT)
Dept: BEHAVIORAL/MENTAL HEALTH CLINIC | Facility: CLINIC | Age: 68
End: 2023-12-04

## 2023-12-04 NOTE — PROGRESS NOTES
Psychotherapy Discharge Summary    Preferred Name: Julio Rodriguez  YOB: [birth year]    Admission date to psychotherapy: 12/6/22    Referred by: self    Presenting Problem: chronic pain issues    Course of treatment included : individual therapy     Progress/Outcome of Treatment Goals (brief summary of course of treatment) Provided support around chronic pain and family discord    Treatment Complications (if any): None    Treatment Progress: good    Current [facility] Psychiatric Provider: NA    Discharge Medications include: NA    Discharge Date: 3/14/23    Discharge Diagnosis: No diagnosis found.     Criteria for Discharge: change in Insurance    Aftercare recommendations include (include specific referral names and phone numbers, if appropriate): Continue to seek therapy as needed    Prognosis: good

## 2023-12-05 ENCOUNTER — EVALUATION (OUTPATIENT)
Dept: PHYSICAL THERAPY | Facility: CLINIC | Age: 68
End: 2023-12-05
Payer: COMMERCIAL

## 2023-12-05 DIAGNOSIS — M48.062 SPINAL STENOSIS OF LUMBAR REGION WITH NEUROGENIC CLAUDICATION: ICD-10-CM

## 2023-12-05 DIAGNOSIS — M54.50 CHRONIC BILATERAL LOW BACK PAIN WITHOUT SCIATICA: ICD-10-CM

## 2023-12-05 DIAGNOSIS — M54.16 LUMBAR RADICULOPATHY: ICD-10-CM

## 2023-12-05 DIAGNOSIS — M47.816 LUMBAR SPONDYLOSIS: Primary | ICD-10-CM

## 2023-12-05 DIAGNOSIS — G89.29 CHRONIC BILATERAL LOW BACK PAIN WITHOUT SCIATICA: ICD-10-CM

## 2023-12-05 DIAGNOSIS — Z98.1 STATUS POST LUMBAR SPINAL FUSION: ICD-10-CM

## 2023-12-05 PROCEDURE — 97110 THERAPEUTIC EXERCISES: CPT

## 2023-12-05 NOTE — PROGRESS NOTES
PT Re-Evaluation     Today's date: 2023  Patient name: Beau Kimbrough  : 1955  MRN: [id number]  Referring provider: Conchita Miller MD  Dx:   Encounter Diagnosis     ICD-10-CM    1. Lumbar spondylosis  M47.816       2. Status post lumbar spinal fusion  Z98.1       3. Lumbar radiculopathy  M54.16       4. Spinal stenosis of lumbar region with neurogenic claudication  M48.062       5. Chronic bilateral low back pain without sciatica  M54.50     G89.29                      Assessment  Assessment details: Beau Kimbrough is a pleasant 79 y.o. male who presents with low back pain with intermittent bilateral leg pain s/p L1-S1 posterior fusion on 2023. He presents with decreased hip and lumbar mobility and generalized bilateral LE weakness. He demonstrates high risk for falls with TUG time of 40 seconds with SPC. He was educated on use of a walker rather than SPC for safety with walking as he has reported multiple falls in the past year. These signs and symptoms are consistent with Status post lumbar spinal fusion, Chronic bilateral low back pain without sciatica, Spinal stenosis of lumbar region with neurogenic claudication, Lumbar spondylosis, Lumbar radiculopathy. He will benefit from skilled PT to address impairments and return to maximum level of function as well as reduce risk for falls. At this time no referral is necessary based on examination. 10/24/2023: Patient has made progress in BLE strength and lumbar flexion ROM but continues to have pain in lumbar region and BLE. He is limited with ADLs, walking, dressing due to decreased strength and core control. He would benefit from skilled PT to maximize overall function and manage pain. 2023: Patient demonstrates no significant change in BLE strength, a mild improvement in mobility, and continued decreased balance. These impairments are limiting him with walking with SPC, ADLs, dressing, household chores.  He will benefit from skilled PT for an additional 4 weeks to address impairments and return to maximum level of function and progress HEP independence. Education given on prognosis  Impairments: abnormal or restricted ROM, activity intolerance, impaired balance, impaired physical strength, pain with function and poor posture   Prognosis details: Positive prognostic indicators include positive attitude toward recovery, motivated to improve, high self-efficacy, good understanding of condition, realistic expectations. Negative prognostic indicators include chronicity of symptoms, high symptom irritability. Goals  STG to be achieved in 6 weeks  Patient will have improved lumbar ROM to moderate limitation. (Met)  Patient will have improved gross BLE strength to 4/5(someprogress)  Patient will have improved TUG time with SPC to 25 seconds(not assessed)  Patient will be able to walk household distances with less difficulty(some progress)  Patient will be independent with HEP.     LTG to be achieved in 12 weeks  Patient will be able to walk community distances with less difficulty  Patient will be able to ascend/descend stairs with less difficulty (not assessed)  Patient will be able to dress independently (good progress)      Plan  Patient would benefit from: skilled physical therapy  Planned modality interventions: cryotherapy and thermotherapy: hydrocollator packs  Planned therapy interventions: manual therapy, joint mobilization, neuromuscular re-education, therapeutic exercise, therapeutic activities, strengthening, stretching, home exercise program, gait training, functional ROM exercises, body mechanics training, balance, flexibility and patient education  Frequency: 2x week  Plan of Care beginning date: 12/5/2023  Plan of Care expiration date: 1/2/2024  Treatment plan discussed with: patient        Subjective Evaluation    History of Present Illness  Date of surgery: 4/11/2023  Mechanism of injury: Patient reports back pain and bilateral leg pain for at least 2 years. He had tried conservative care like injections, etc prior to surgery. He had a posterior fusion of L1-S1 on 4/11/2023. Since surgery, he has had so much back pain and intermittent pain in bilateral legs. Patient did have home PT up until 2 weeks ago for 5-6 visit. Has difficulty with dressing lower body, walking, going up and down the stairs, ADLs. Notes falling frequently. 10/24/2023: Patient continues to have low back pain with pain down back of legs. He uses cane and walker to walk but has difficulty due to LBP Continued difficulty with dressing, ADLs, etc.     12/5/2023: Patient reports fatigue and significant pain upon arrival today. Has some days that are better than others.  Continued pain in back, legs that limits him with walking, dressing, ADLs  Patient Goals  Patient goal: Patient would like to be able to manage back and leg pain and improve overall function         Objective     Tenderness     Additional Tenderness Details  TTP to thoracolumbar paraspinals    Active Range of Motion     Lumbar   Flexion:  Restriction level: moderate  Extension:  Restriction level: minimal  Left Hip   External rotation (90/90): 30 degrees with pain  Internal rotation (90/90): 20 degrees with pain    Right Hip   External rotation (90/90): 26 degrees with pain  Internal rotation (90/90): 20 degrees with pain    Strength/Myotome Testing     Left Hip   Planes of Motion   Flexion: 4-  Abduction: 4-  Adduction: 4+    Right Hip   Planes of Motion   Flexion: 4-  Abduction: 4-  Adduction: 4+    Left Knee   Flexion: 4-  Extension: 4    Right Knee   Flexion: 4-  Extension: 4    Left Ankle/Foot   Dorsiflexion: 4-    Right Ankle/Foot   Dorsiflexion: 4-    Functional Assessment        Comments  TUG w/ SPC on 8/17/2023: 40 seconds             Precautions: posterior fusion L1-S1 on 4/11/2023; hx of cancer; fall risk  Functional Limitations: walking, stairs, dressing, ADLs  Impairments: lumbar and hip ROM, BLE strength, TUG time  [facility] Code: [email or web address]   POC expiration: 1/16/2024    Manuals 10/26 11/7 11/9 11/14 11/16 11/21 11/30 12/5   STM lumbar paraspinals sitting           Bilateral hip PROM           Bilateral hip LAD                      Neuro Re-Ed           hooklying TA           hooklying TA w/ march           Standing pball press           Std perturbations in [place]           STD rows with cues for balance  OTB x15 OTB 20x GTB 20x   nv     Standing BLE stance no BUE support   3x30"   nv     Iso ADD/ABD with TA Seated 10"x20 Seated 10"x10 Seated 10"x10 Seated 10"x10 ea Seated 10"x10 ea Seated 10"x10 Seated 10"X10 Seated 10"x10   Ther Ex           bike Nv  P! 5' 5' 6'  6' 6'    LAQ   5# 2x10    5# x20 5# 2x10   Seated march           Seated lumbar flexion 25x  RW     20x 10x 10x   LTR           Sit to stand           SKTC           Side stepping    3 laps  3 laps 3 laps 1.5#    Standing knee flexion 10 ea RW          walking march nv Standing 20x Standing 1.5# 20x Standing 1.5# 2x20 Standing 1.5# 2x20 Standing 1.5# 2x20 Standing 1.5# 2x20    HR 20 HR/TR seated 20 HR/TR seated 20 HR/TR seated        Seated hamstring stretch 3x30" ea  3x30" 3x30"       Seated 3 way pball rollout 5"x5 5"x5 ea 5"x5ea 5x5"ea 5x5" ea 5"x5 ea 5"x5ea 10ea   Leg press           Mini squat 2x10  RW  20x        Standing hip flex and abd Flex/ext  RW 10 ea  B LE 20 abd ea 1.5# abd, ext 10ea 1.5# 1abd, ext 10 ea 1.5# abd, ext 10 ea 1.5# abd 1.5# abd 20ea    Ther Activity           Manual push of therapist in // bars            Fwd step up           Lateral step up           Gait Training                                 Modalities

## 2023-12-06 ENCOUNTER — TELEPHONE (OUTPATIENT)
Dept: NEUROSURGERY | Facility: CLINIC | Age: 68
End: 2023-12-06

## 2023-12-06 NOTE — TELEPHONE ENCOUNTER
----- Message from Melody Arcos sent at 12/6/2023 11:07 AM EST -----  Regarding: Melody Arcos  Contact: [phone]  Twana Closs thank you. Faxing it over now.

## 2023-12-06 NOTE — TELEPHONE ENCOUNTER
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 I am looking to get [address] at [facility] for him. I will be faxing you a form that we need filled out asap so i can return it back to the state for approval."  She was able to fax the form to me and i have no problem completing it if you are in agreement? Please advise. Thanks.

## 2023-12-07 ENCOUNTER — PROCEDURE VISIT (OUTPATIENT)
Dept: UROLOGY | Facility: CLINIC | Age: 68
End: 2023-12-07
Payer: COMMERCIAL

## 2023-12-07 ENCOUNTER — TELEPHONE (OUTPATIENT)
Dept: NEUROSURGERY | Facility: CLINIC | Age: 68
End: 2023-12-07

## 2023-12-07 DIAGNOSIS — N36.44 DETRUSOR SPHINCTER DYSSYNERGIA: ICD-10-CM

## 2023-12-07 DIAGNOSIS — R33.9 INCOMPLETE EMPTYING OF BLADDER: Primary | ICD-10-CM

## 2023-12-07 DIAGNOSIS — R39.15 URGENCY OF URINATION: ICD-10-CM

## 2023-12-07 LAB
SL AMB  POCT GLUCOSE, UA: 4
SL AMB LEUKOCYTE ESTERASE,UA: ABNORMAL
SL AMB POCT BILIRUBIN,UA: ABNORMAL
SL AMB POCT BLOOD,UA: ABNORMAL
SL AMB POCT CLARITY,UA: CLEAR
SL AMB POCT COLOR,UA: YELLOW
SL AMB POCT KETONES,UA: ABNORMAL
SL AMB POCT NITRITE,UA: ABNORMAL
SL AMB POCT PH,UA: 5
SL AMB POCT SPECIFIC GRAVITY,UA: 1.01
SL AMB POCT URINE PROTEIN: ABNORMAL
SL AMB POCT UROBILINOGEN: NORMAL

## 2023-12-07 PROCEDURE — 51784 ANAL/URINARY MUSCLE STUDY: CPT

## 2023-12-07 PROCEDURE — 81002 URINALYSIS NONAUTO W/O SCOPE: CPT

## 2023-12-07 PROCEDURE — 51741 ELECTRO-UROFLOWMETRY FIRST: CPT

## 2023-12-07 PROCEDURE — 51797 INTRAABDOMINAL PRESSURE TEST: CPT

## 2023-12-07 PROCEDURE — 51728 CYSTOMETROGRAM W/VP: CPT

## 2023-12-07 NOTE — PROGRESS NOTES
CC: "I catheterize myself sometimes. Daytime I get about 25-50 ml. Aldean Francisco to empty bladder when monsalve"    CIC volumes are Larger (150-200 ml) after sleeping all night  Last CIC was 2 days ago  Voided this morning  Denies pain /burning with voiding  Strains to void at times    Uroflow:       Voided volume:    388.2 ml       Max flow rate:       14.1  ml/sec       Average flow rate:   5.2  ml/sec       PVR:  175   ml         CMG:       Position:  sitting           Fill sensation:    44 ml,    pdet -5   cmH2O       First urge:          67 ml,    pdet  -1  cmH2O        Normal urge:    227 ml,    pdet  -2  cmH2O       Must urge:         not reached        Permission to void first fill:    230 ml,     pdet -2 cmH2O           Max pdet during void    52 cm H2O       Voided volume:    222 ml    2nd fill sensations:       Fill sensation:     62 ml,  pdet -9 cmH2O       First urge:          91 ml,  pdet -2 cmH2O       Normal urge:    200 ml,  pdet  9 cmH2O         Permission to void 2nd fill:  301 ml,  pdet -3 cmH2O       Max pdet during void:    50 cmH2O       Voided volume:       302 ml    Bladder stability:     unstable at 207 ml  only without leakage during 2nd fill    Compliance:  normal        EMG activity:       Normal during filling,        abnormal during voiding    Comments:  After initial fill and instructed to void with normal urge, fill was restarted    Sensory urgency  with normal urge     DI noted only 1x during 2nd fill without leakage   + EMG during voiding   Calculated PVR at end of test was 0 ml    Urodynamics    Date/Time: 12/7/2023 8:30 AM    Performed by: Dennis Harmon RN  Authorized by: Denny Constantino MD  Universal Protocol:  Consent: Verbal consent obtained. Written consent obtained.   Risks and benefits: risks, benefits and alternatives were discussed  Consent given by: patient  Patient understanding: patient states understanding of the procedure being performed  Patient consent: the patient's understanding of the procedure matches consent given  Procedure consent: procedure consent matches procedure scheduled  Patient identity confirmed: verbally with patient  Procedure - Urodynamics:  Procedure details: CMG, EMG and Complex Uroflow (performed with electronic device)       Voiding Pressure Study: Yes    Intra-abdominal Voiding Pressure Study: Yes    Post-procedure:     Patient tolerance: Patient tolerated procedure well with no immediate complications

## 2023-12-12 ENCOUNTER — APPOINTMENT (OUTPATIENT)
Dept: PHYSICAL THERAPY | Facility: CLINIC | Age: 68
End: 2023-12-12
Payer: COMMERCIAL

## 2023-12-14 ENCOUNTER — APPOINTMENT (OUTPATIENT)
Dept: PHYSICAL THERAPY | Facility: CLINIC | Age: 68
End: 2023-12-14
Payer: COMMERCIAL

## 2023-12-15 PROBLEM — N30.00 ACUTE CYSTITIS WITHOUT HEMATURIA: Status: RESOLVED | Noted: 2023-10-16 | Resolved: 2023-12-15

## 2023-12-19 ENCOUNTER — OFFICE VISIT (OUTPATIENT)
Dept: PHYSICAL THERAPY | Facility: CLINIC | Age: 68
End: 2023-12-19
Payer: COMMERCIAL

## 2023-12-19 DIAGNOSIS — M47.816 LUMBAR SPONDYLOSIS: Primary | ICD-10-CM

## 2023-12-19 DIAGNOSIS — Z98.1 STATUS POST LUMBAR SPINAL FUSION: ICD-10-CM

## 2023-12-19 DIAGNOSIS — M54.16 LUMBAR RADICULOPATHY: ICD-10-CM

## 2023-12-19 DIAGNOSIS — M54.50 CHRONIC BILATERAL LOW BACK PAIN WITHOUT SCIATICA: ICD-10-CM

## 2023-12-19 DIAGNOSIS — M48.062 SPINAL STENOSIS OF LUMBAR REGION WITH NEUROGENIC CLAUDICATION: ICD-10-CM

## 2023-12-19 DIAGNOSIS — G89.29 CHRONIC BILATERAL LOW BACK PAIN WITHOUT SCIATICA: ICD-10-CM

## 2023-12-19 PROCEDURE — 97110 THERAPEUTIC EXERCISES: CPT

## 2023-12-19 NOTE — PROGRESS NOTES
"Daily Note     Today's date: 2023  Patient name: Juan San  : 1955  MRN: [id number]  Referring provider: James Moraes MD  Dx:   Encounter Diagnosis     ICD-10-CM    1. Lumbar spondylosis  M47.816       2. Status post lumbar spinal fusion  Z98.1       3. Lumbar radiculopathy  M54.16       4. Spinal stenosis of lumbar region with neurogenic claudication  M48.062       5. Chronic bilateral low back pain without sciatica  M54.50     G89.29                      Subjective: Patient reports increased pain in waist and legs      Objective: See treatment diary below      Assessment: Patient tolerated treatment fair. Verbal cues for exercises. Decreased endurance with standing activities. Will continue progressing strength as tolerated       Plan: progress per POC as tolerated     Precautions: posterior fusion L1-S1 on 2023; hx of cancer; fall risk  Functional Limitations: walking, stairs, dressing, ADLs  Impairments: lumbar and hip ROM, BLE strength, TUG time  [facility]: [id number]   POC expiration: 2024    Manuals     STM lumbar paraspinals sitting          Bilateral hip PROM          Bilateral hip LAD                    Neuro Re-Ed          hooklying TA          hooklying TA w/ march          Standing pball press          Std perturbations in romberg          STD rows with cues for balance    nv       Standing BLE stance no BUE support   nv       Iso ADD/ABD with TA Seated 10\"x10 ea Seated 10\"x10 ea Seated 10\"x10 Seated 10\"X10 Seated 10\"x10 Seated 10\"x10    Ther Ex          bike 6'  6' 6'      LAQ    5# x20 5# 2x10     Seated march          Seated lumbar flexion   20x 10x 10x 10x    LTR          Sit to stand          SKTC          Side stepping 3 laps  3 laps 3 laps 1.5#      Standing knee flexion          walking march Standing 1.5# 2x20 Standing 1.5# 2x20 Standing 1.5# 2x20 Standing 1.5# 2x20  Standing 20x    HR          Seated hamstring stretch 3x30\"     " "    Seated 3 way pball rollout 5x5\"ea 5x5\" ea 5\"x5 ea 5\"x5ea 10ea 5\"x5ea    Leg press          Mini squat          Standing hip flex and abd 1.5# 1abd, ext 10 ea 1.5# abd, ext 10 ea 1.5# abd 1.5# abd 20ea  Abd 2x10    Ther Activity          Manual push of therapist in // bars           Fwd step up          Lateral step up          Gait Training                              Modalities                                     "

## 2023-12-21 ENCOUNTER — APPOINTMENT (OUTPATIENT)
Dept: PHYSICAL THERAPY | Facility: CLINIC | Age: 68
End: 2023-12-21
Payer: COMMERCIAL

## 2023-12-26 ENCOUNTER — APPOINTMENT (OUTPATIENT)
Dept: PHYSICAL THERAPY | Facility: CLINIC | Age: 68
End: 2023-12-26
Payer: COMMERCIAL

## 2023-12-28 ENCOUNTER — APPOINTMENT (OUTPATIENT)
Dept: PHYSICAL THERAPY | Facility: CLINIC | Age: 68
End: 2023-12-28
Payer: COMMERCIAL

## 2024-01-01 ENCOUNTER — APPOINTMENT (EMERGENCY)
Dept: CT IMAGING | Facility: HOSPITAL | Age: 69
End: 2024-01-01
Payer: COMMERCIAL

## 2024-01-01 ENCOUNTER — HOSPITAL ENCOUNTER (EMERGENCY)
Facility: HOSPITAL | Age: 69
Discharge: HOME | End: 2024-01-02
Attending: EMERGENCY MEDICINE
Payer: COMMERCIAL

## 2024-01-01 DIAGNOSIS — G89.29 ACUTE EXACERBATION OF CHRONIC LOW BACK PAIN: Primary | ICD-10-CM

## 2024-01-01 DIAGNOSIS — M54.50 ACUTE EXACERBATION OF CHRONIC LOW BACK PAIN: Primary | ICD-10-CM

## 2024-01-01 PROCEDURE — 96372 THER/PROPH/DIAG INJ SC/IM: CPT

## 2024-01-01 PROCEDURE — 72131 CT LUMBAR SPINE W/O DYE: CPT

## 2024-01-01 PROCEDURE — 99284 EMERGENCY DEPT VISIT MOD MDM: CPT | Performed by: EMERGENCY MEDICINE

## 2024-01-01 PROCEDURE — G1004 CDSM NDSC: HCPCS

## 2024-01-01 PROCEDURE — 99285 EMERGENCY DEPT VISIT HI MDM: CPT

## 2024-01-01 RX ORDER — DIAZEPAM 5 MG/ML
5 INJECTION, SOLUTION INTRAMUSCULAR; INTRAVENOUS ONCE
Status: COMPLETED | OUTPATIENT
Start: 2024-01-01 | End: 2024-01-01

## 2024-01-01 RX ORDER — KETOROLAC TROMETHAMINE 30 MG/ML
30 INJECTION, SOLUTION INTRAMUSCULAR; INTRAVENOUS ONCE
Status: COMPLETED | OUTPATIENT
Start: 2024-01-01 | End: 2024-01-01

## 2024-01-01 RX ORDER — LIDOCAINE 50 MG/G
1 PATCH TOPICAL ONCE
Status: DISCONTINUED | OUTPATIENT
Start: 2024-01-01 | End: 2024-01-02 | Stop reason: HOSPADM

## 2024-01-01 RX ADMIN — DIAZEPAM 5 MG: 5 INJECTION INTRAMUSCULAR; INTRAVENOUS at 23:12

## 2024-01-01 RX ADMIN — KETOROLAC TROMETHAMINE 30 MG: 30 INJECTION, SOLUTION INTRAMUSCULAR at 23:12

## 2024-01-01 RX ADMIN — LIDOCAINE 1 PATCH: 50 PATCH TOPICAL at 23:10

## 2024-01-02 VITALS
WEIGHT: 178 LBS | TEMPERATURE: 98 F | RESPIRATION RATE: 14 BRPM | OXYGEN SATURATION: 99 % | HEIGHT: 70 IN | BODY MASS INDEX: 25.48 KG/M2 | DIASTOLIC BLOOD PRESSURE: 72 MMHG | SYSTOLIC BLOOD PRESSURE: 148 MMHG | HEART RATE: 84 BPM

## 2024-01-02 NOTE — ED PROVIDER NOTES
History  Chief Complaint   Patient presents with    Back Pain     Pt c/o lower back pain with weakness in legs for a couple of months. States he has fallen within last 2 weeks, denies hitting head/thinners.     Hx from patient, wife, medical records - [facility] chronic back pain, anxiety, peripheral neuropathy, DM, HTN, previous lumbar surgery -, patient c/o Chronic back pain radiates around waist worsening since fall couple weeks ago down 4 steps.  Fell due to chronic weakness.  Did not hit head or pass out, no headache or confusion.  No new loss of bowel or bladder control, no new numbness or weakness.  He is Taking tylenol/ robaxin without relief.   He takes neurontin as well.        Prior to Admission Medications   Prescriptions Last Dose Informant Patient Reported? Taking?   ACCU-CHEK FABIANO PLUS test strip  Self Yes No   Si (four) times a day   ACCU-CHEK FASTCLIX LANCETS MISC  Self Yes No   Si (four) times a day   CHOLECALCIFEROL PO  Self Yes No   Sig: Take 5,000 Units by mouth daily   GNP Aspirin Low Dose 81 MG EC tablet  Self Yes No   Sig: Take 1 tablet (81 mg total) by mouth daily Do not start before 2023.   Jardiance 10 MG TABS  Self Yes No   Sig: Take 10 mg by mouth every morning   METOPROLOL SUCCINATE ER PO  Self Yes No   Sig: Take 75 mg by mouth every morning   NIFEdipine (PROCARDIA XL) 30 mg 24 hr tablet  Self Yes No   Sig: Take 120 mg by mouth every morning Takes 90 mg and 30 mg =120 mg every AM   NIFEdipine (PROCARDIA XL) 90 mg 24 hr tablet  Self Yes No   Sig: Take 120 mg by mouth every morning Takes 90 mg and 30 mg =120 mg every AM   acetaminophen (TYLENOL) 500 mg tablet  Self No No   Sig: Take 1 tablet (500 mg total) by mouth every 6 (six) hours as needed for mild pain 500 mg tablet   clonazePAM (KlonoPIN) 1 mg tablet  Self Yes No   Sig: Take 1 mg by mouth 2 (two) times a day & 3rd pill PRN   finasteride (PROSCAR) 5 mg tablet  Self No No   Sig: Take 1 tablet (5 mg total) by mouth  daily   fluticasone (FLONASE) 50 mcg/act nasal spray  Self Yes No   Si spray into each nostril 2 (two) times a day   folic acid (FOLVITE) 1 mg tablet  Self Yes No   Sig: Take 2,000 mcg by mouth daily   gabapentin (NEURONTIN) 400 mg capsule   No No   Sig: Take 1 capsule (400 mg total) by mouth daily at bedtime   ketoconazole (NIZORAL) 2 % shampoo  Self Yes No   Sig: Apply 1 application. topically daily Use as directed   lidocaine (LIDODERM) 5 %   No No   Sig: Apply 1 patch topically over 12 hours daily Remove & Discard patch within 12 hours or as directed by MD Do not start before 2023.   losartan-hydrochlorothiazide (HYZAAR) 100-25 MG per tablet  Self Yes No   Sig: Take 1 tablet by mouth every morning   lovastatin (MEVACOR) 20 mg tablet  Self Yes No   Sig: Take 20 mg by mouth every morning   melatonin 3 mg   No No   Sig: Take 1 tablet (3 mg total) by mouth daily at bedtime   metFORMIN (GLUCOPHAGE) 1000 MG tablet  Self Yes No   Sig: Take 1,000 mg by mouth 2 (two) times a day with meals    methocarbamol (ROBAXIN) 750 mg tablet   No No   Sig: Take 1 tablet (750 mg total) by mouth every 8 (eight) hours as needed for muscle spasms   mirtazapine (REMERON) 45 MG tablet  Self Yes No   Sig: Take 45 mg by mouth daily at bedtime   sertraline (ZOLOFT) 100 mg tablet  Self Yes No   Sig: Take 100 mg by mouth every morning   tamsulosin (FLOMAX) 0.4 mg  Self No No   Sig: Take 1 capsule (0.4 mg total) by mouth daily with dinner      Facility-Administered Medications: None       Past Medical History:   Diagnosis Date    Anxiety     Arthritis     Cancer (HCC)     Depression     Diabetes mellitus (HCC)     Hypertension     Liver disease     Mitral valve prolapse     Peripheral neuropathy     Neuropathy    PONV (postoperative nausea and vomiting)     Thyroid disease        Past Surgical History:   Procedure Laterality Date    COLONOSCOPY      INCISION AND DRAINAGE OF WOUND Left 2022    Procedure: INCISION AND  DRAINAGE (I&D) EXTREMITY;  Surgeon: Moustapha Cote DPM;  Location:  MAIN OR;  Service: Podiatry    JOINT REPLACEMENT Left 2022    Left TSA    NE ARTHRODESIS POSTERIOR/PSTLAT TQ 1NTRSPC LUMBAR Bilateral 2023    Procedure: L1-S1 navigated posterior decompression with instrumented fixation fusion;  Surgeon: James Moraes MD;  Location:  MAIN OR;  Service: Neurosurgery    THYROID SURGERY      remove cancer       Family History   Problem Relation Age of Onset    No Known Problems Father     No Known Problems Mother     Colon cancer Neg Hx      I have reviewed and agree with the history as documented.    E-Cigarette/Vaping    E-Cigarette Use Current Some Day User     Comments medical marijuana - occ      E-Cigarette/Vaping Substances    Nicotine No     THC No     CBD No     Flavoring No     Other No     Unknown No      Social History     Tobacco Use    Smoking status: Former     Current packs/day: 0.00     Average packs/day: 0.3 packs/day for 5.9 years (1.5 ttl pk-yrs)     Types: Cigarettes     Start date: 1975     Quit date: 1981     Years since quittin.6    Smokeless tobacco: Never    Tobacco comments:     quit    Vaping Use    Vaping status: Some Days   Substance Use Topics    Alcohol use: Yes     Alcohol/week: 4.0 - 7.0 standard drinks of alcohol     Types: 1 - 2 Glasses of wine, 2 - 3 Cans of beer, 1 - 2 Shots of liquor per week     Comment: only on special occasions    Drug use: Yes     Frequency: 7.0 times per week     Types: Marijuana     Comment: Medical Marijuana       Review of Systems   Constitutional:  Negative for chills and fever.   HENT:  Negative for ear pain and sore throat.    Eyes:  Negative for pain and visual disturbance.   Respiratory:  Negative for cough and shortness of breath.    Cardiovascular:  Negative for chest pain and palpitations.   Gastrointestinal:  Negative for abdominal pain and vomiting.   Genitourinary:  Negative for dysuria and hematuria.    Musculoskeletal:  Positive for back pain. Negative for arthralgias.   Skin:  Negative for color change and rash.   Neurological:  Negative for seizures and syncope.   All other systems reviewed and are negative.      Physical Exam  Physical Exam  Vitals and nursing note reviewed.   Constitutional:       General: He is not in acute distress.     Appearance: He is well-developed.   HENT:      Head: Normocephalic and atraumatic.   Eyes:      Conjunctiva/sclera: Conjunctivae normal.   Cardiovascular:      Rate and Rhythm: Normal rate and regular rhythm.      Heart sounds: No murmur heard.  Pulmonary:      Effort: Pulmonary effort is normal. No respiratory distress.      Breath sounds: Normal breath sounds.   Abdominal:      General: There is no distension.      Palpations: Abdomen is soft.      Tenderness: There is no abdominal tenderness.   Musculoskeletal:         General: No swelling.      Cervical back: Neck supple. No tenderness or bony tenderness.      Lumbar back: Tenderness present. No bony tenderness. Normal range of motion. Negative right straight leg raise test and negative left straight leg raise test.   Skin:     General: Skin is warm and dry.      Capillary Refill: Capillary refill takes less than 2 seconds.   Neurological:      General: No focal deficit present.      Mental Status: He is alert.      Sensory: No sensory deficit.      Motor: No weakness.   Psychiatric:         Mood and Affect: Mood normal.         Vital Signs  ED Triage Vitals [01/01/24 2219]   Temperature Pulse Respirations Blood Pressure SpO2   98 °F (36.7 °C) 80 18 (!) 174/82 100 %      Temp Source Heart Rate Source Patient Position - Orthostatic VS BP Location FiO2 (%)   Oral Monitor -- Right arm --      Pain Score       10 - Worst Possible Pain           Vitals:    01/01/24 2219 01/02/24 0141   BP: (!) 174/82 148/72   Pulse: 80 84         Visual Acuity      ED Medications  Medications   diazepam (VALIUM) injection 5 mg (5 mg  Intramuscular Given 1/1/24 2312)   ketorolac (TORADOL) injection 30 mg (30 mg Intramuscular Given 1/1/24 2312)       Diagnostic Studies  Results Reviewed       None                   CT spine lumbar without contrast   Final Result by Anish Ruiz MD (01/02 0127)      No acute fracture or traumatic malalignment.   Chronic degenerative/postsurgical changes as above not significantly changed in appearance compared to prior study dated 10/9/2023.   Similar lucency surrounding bilateral S1 transpedicular screws concerning for loosening.      Workstation performed: [id number]                    Procedures  Procedures         ED Course  ED Course as of 01/02/24 0440   Tue Jan 02, 2024   0138 Extensive conversation with patient and his wife.  No relief with meds given in the ER.  I offered prescription for tramadol and oxycodone.  Tramadol did not help he does not want to take oxycodone.  I offered increasing the Robaxin but he said that does not cannot help because this is pain.  He feels safe going home I actually offered observation admission with physical therapy consult but he declined that as well as he already has outpatient physical therapy.                                             Medical Decision Making  Diff includes exacerbation of chronic back pain, less likely new fracture or radiculopathy, less likely cauda equina or cord compression.    Amount and/or Complexity of Data Reviewed  Radiology: ordered.    Risk  Prescription drug management.             Disposition  Final diagnoses:   Acute exacerbation of chronic low back pain     Time reflects when diagnosis was documented in both MDM as applicable and the Disposition within this note       Time User Action Codes Description Comment    1/2/2024  1:39 AM Osmar Roque [M54.50,  G89.29] Acute exacerbation of chronic low back pain           ED Disposition       ED Disposition   Discharge    Condition   Stable    Date/Time   Tue Jan 2, 2024  1:39 AM    Comment    Juan San discharge to home/self care.                   Follow-up Information       Follow up With Specialties Details Why Contact Info Additional Information    [facility]   [address]  [address]  [facility] [address] [phone] [facility], [address], [place], [place], [address] [phone]            Discharge Medication List as of 1/2/2024  1:40 AM        CONTINUE these medications which have NOT CHANGED    Details   ACCU-CHEK FABIANO PLUS test strip 4 (four) times a day, Starting Mon 12/17/2018, Historical Med      ACCU-CHEK FASTCLIX LANCETS MISC 4 (four) times a day, Starting Sat 12/22/2018, Historical Med      acetaminophen (TYLENOL) 500 mg tablet Take 1 tablet (500 mg total) by mouth every 6 (six) hours as needed for mild pain 500 mg tablet, Starting Sat 4/22/2023, OTC      CHOLECALCIFEROL PO Take 5,000 Units by mouth daily, Historical Med      clonazePAM (KlonoPIN) 1 mg tablet Take 1 mg by mouth 2 (two) times a day & 3rd pill PRN, Starting Mon 10/24/2022, Historical Med      finasteride (PROSCAR) 5 mg tablet Take 1 tablet (5 mg total) by mouth daily, Starting Tue 7/18/2023, Normal      fluticasone (FLONASE) 50 mcg/act nasal spray 1 spray into each nostril 2 (two) times a day, Historical Med      folic acid (FOLVITE) 1 mg tablet Take 2,000 mcg by mouth daily, Starting Mon 12/17/2018, Historical Med      gabapentin (NEURONTIN) 400 mg capsule Take 1 capsule (400 mg total) by mouth daily at bedtime, Starting Thu 10/12/2023, Until Sat 11/11/2023, Normal      GNP Aspirin Low Dose 81 MG EC tablet Take 1 tablet (81 mg total) by mouth daily Do not start before April 25, 2023., Starting Tue 4/25/2023, Historical Med      Jardiance 10 MG TABS Take 10 mg by mouth every morning, Starting Wed 7/20/2022, Historical Med      ketoconazole (NIZORAL) 2 % shampoo Apply 1 application. topically daily Use as  directed, Starting Wed 10/10/2018, Historical Med      lidocaine (LIDODERM) 5 % Apply 1 patch topically over 12 hours daily Remove & Discard patch within 12 hours or as directed by MD Do not start before October 12, 2023., Starting u 10/12/2023, Normal      losartan-hydrochlorothiazide (HYZAAR) 100-25 MG per tablet Take 1 tablet by mouth every morning, Starting Mon 12/17/2018, Historical Med      lovastatin (MEVACOR) 20 mg tablet Take 20 mg by mouth every morning, Starting Mon 12/17/2018, Historical Med      melatonin 3 mg Take 1 tablet (3 mg total) by mouth daily at bedtime, Starting Wed 10/11/2023, Normal      metFORMIN (GLUCOPHAGE) 1000 MG tablet Take 1,000 mg by mouth 2 (two) times a day with meals , Starting Wed 1/2/2019, Historical Med      methocarbamol (ROBAXIN) 750 mg tablet Take 1 tablet (750 mg total) by mouth every 8 (eight) hours as needed for muscle spasms, Starting Wed 10/11/2023, Normal      METOPROLOL SUCCINATE ER PO Take 75 mg by mouth every morning, Historical Med      mirtazapine (REMERON) 45 MG tablet Take 45 mg by mouth daily at bedtime, Starting Tue 12/11/2018, Historical Med      NIFEdipine (PROCARDIA XL) 30 mg 24 hr tablet Take 120 mg by mouth every morning Takes 90 mg and 30 mg =120 mg every AM, Starting Sun 7/10/2022, Historical Med      NIFEdipine (PROCARDIA XL) 90 mg 24 hr tablet Take 120 mg by mouth every morning Takes 90 mg and 30 mg =120 mg every AM, Starting Mon 12/17/2018, Historical Med      sertraline (ZOLOFT) 100 mg tablet Take 100 mg by mouth every morning, Historical Med      tamsulosin (FLOMAX) 0.4 mg Take 1 capsule (0.4 mg total) by mouth daily with dinner, Starting Tue 7/18/2023, Normal             No discharge procedures on file.    PDMP Review         Value Time User    PDMP Reviewed  Yes 4/22/2023  6:39 AM Katja Oshea PA-C            ED Provider  Electronically Signed by             Osmar Roque,   01/02/24 3639

## 2024-01-08 ENCOUNTER — TELEPHONE (OUTPATIENT)
Age: 69
End: 2024-01-08

## 2024-01-08 ENCOUNTER — HOSPITAL ENCOUNTER (OUTPATIENT)
Dept: RADIOLOGY | Facility: HOSPITAL | Age: 69
Discharge: HOME | End: 2024-01-08
Payer: COMMERCIAL

## 2024-01-08 DIAGNOSIS — Z98.1 S/P LUMBAR FUSION: ICD-10-CM

## 2024-01-08 PROCEDURE — 72110 X-RAY EXAM L-2 SPINE 4/>VWS: CPT

## 2024-01-08 NOTE — TELEPHONE ENCOUNTER
Caller: pt spouse Tatyana    Doctor: Bruce    Reason for call: pt was seen in the ER on 1/1/24 for pain. Pt spouse would like to know if pt can be seen sooner than 2/1/24, due to the pain the pt is in.    Call back#: [phone]

## 2024-01-08 NOTE — TELEPHONE ENCOUNTER
Caller: patient spouse    Doctor: Bruce    Reason for call: patient is in sever pain and would like to know what he is able to do, while waiting for his appt. Gabapentin is not helping on the low dose.  Call back#:

## 2024-01-08 NOTE — TELEPHONE ENCOUNTER
Called and LMOM for patient and his wife to inform them at this time we do not any any sooner appointments and I place him on the wait list.

## 2024-01-09 ENCOUNTER — APPOINTMENT (OUTPATIENT)
Dept: PHYSICAL THERAPY | Facility: CLINIC | Age: 69
End: 2024-01-09
Payer: COMMERCIAL

## 2024-01-09 NOTE — ASSESSMENT & PLAN NOTE
Presents for 6 months POV s/p T12-S1 PLDF with Dr. Moraes on 4/11/2023  Multiple ED visits for back pain/falls/weakness since August.  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  Has been doing PT since September.  Follows with Dr. Barrera (pain management) and recommended for possible open SCS trial at [facility].  Relates that since surgery with sharp pain extending from waist, down hamstrings to ankles with associated tingling. Feels he is getting weaker and falling frequently.  Taking high doses of ibuprofen, Tylenol and Robaxin to manage pain.  On exam, Iliopsoas/hamstrings 3/5, otherwise 4/5 throughout.    Imagings:  Lumbar x-rays, 1/8/2024: No acute osseous abnormality. Intact T12-S1 posterior spinal fixation hardware. Unchanged small perihardware lucency surrounding bilateral S1 transpedicular screws, which can be seen with hardware loosening or infection. Small lucent lesions scattered throughout the bilateral iliac bones are not well appreciated by radiography. Differential includes heterogeneous bone marrow, metastasis, multiple myeloma, among other differentials.  CT lumbar spine wo, 1/1/2024: No acute fracture or traumatic malalignment. Chronic degenerative/postsurgical changes as above not significantly changed in appearance compared to prior study dated 10/9/2023. Similar lucency surrounding bilateral S1 transpedicular screws concerning for loosening.    Plan:  Reviewed imaging extensively with patient.  Discussed findings on bilateral iliac bones concerning for malignancy.  He reports weight loss, lack of appetite, and occasional hot flashes/chills.  I have ordered CT pelvis w/wo contrast to further evaluate.   Follow up once imaging has been completed.  Of note, patient is not interested in any further surgery.

## 2024-01-09 NOTE — TELEPHONE ENCOUNTER
S/w Tatyana, Advised of above. Tatyana verbalized understanding. Will wait for the ov with NM / requested to be called if a sooner opening becomes available.

## 2024-01-09 NOTE — TELEPHONE ENCOUNTER
S/w pt's wife, pt is having severe pain below his surgical level, at his tailbone and radiates outward towards his hip and down his legs. Pt went to the ER on 1/1 and they did a CT which did not show anything new. Pt taking Gabapentin 400 mg daily (can not increase due to s/e), Cymbalta, Methocarbamol, Ibuprofen, Tylenol and medical marijuana with no relief. Pt requesting a pain medication to help with the pain. RN explained policies, pt said the medical marijuana does not help anyway. Pt reports oral steroid helped only for 1 week when he took it back in October. Any recommendations?

## 2024-01-09 NOTE — TELEPHONE ENCOUNTER
Secondary to his invasive surgery, he is not a candidate for a spinal cord stimulator trial, and his multiple medical comorbidities will need to be seen in the office, can try and move up [facility] appointment if available, stay with [facility]- but unfortunately not many other options, may want to consider open spinal cord stimulator trial with [facility]

## 2024-01-10 ENCOUNTER — OFFICE VISIT (OUTPATIENT)
Dept: NEUROSURGERY | Facility: CLINIC | Age: 69
End: 2024-01-10
Payer: COMMERCIAL

## 2024-01-10 VITALS
SYSTOLIC BLOOD PRESSURE: 148 MMHG | HEART RATE: 83 BPM | HEIGHT: 70 IN | DIASTOLIC BLOOD PRESSURE: 76 MMHG | TEMPERATURE: 98.6 F | BODY MASS INDEX: 25.54 KG/M2 | OXYGEN SATURATION: 97 %

## 2024-01-10 DIAGNOSIS — M54.50 CHRONIC BILATERAL LOW BACK PAIN WITHOUT SCIATICA: ICD-10-CM

## 2024-01-10 DIAGNOSIS — Z98.1 S/P LUMBAR FUSION: Primary | ICD-10-CM

## 2024-01-10 DIAGNOSIS — G89.29 CHRONIC BILATERAL LOW BACK PAIN WITHOUT SCIATICA: ICD-10-CM

## 2024-01-10 PROCEDURE — 99214 OFFICE O/P EST MOD 30 MIN: CPT | Performed by: NURSE PRACTITIONER

## 2024-01-10 NOTE — PROGRESS NOTES
Neurosurgery Office Note  Juan San 68 y.o. male MRN: [id number]      Assessment/Plan     Status post lumbar spinal fusion  Presents for 6 months POV s/p T12-S1 PLDF with Dr. Moraes on 4/11/2023  Multiple ED visits for back pain/falls/weakness since August.  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  Has been doing PT since September.  Follows with Dr. Barrera (pain management) and recommended for possible open SCS trial at [facility].  Relates that since surgery with sharp pain extending from waist, down hamstrings to ankles with associated tingling. Feels he is getting weaker and falling frequently.  Taking high doses of ibuprofen, Tylenol and Robaxin to manage pain.  On exam, Iliopsoas/hamstrings 3/5, otherwise 4/5 throughout.    Imagings:  Lumbar x-rays, 1/8/2024: No acute osseous abnormality. Intact T12-S1 posterior spinal fixation hardware. Unchanged small perihardware lucency surrounding bilateral S1 transpedicular screws, which can be seen with hardware loosening or infection. Small lucent lesions scattered throughout the bilateral iliac bones are not well appreciated by radiography. Differential includes heterogeneous bone marrow, metastasis, multiple myeloma, among other differentials.  CT lumbar spine wo, 1/1/2024: No acute fracture or traumatic malalignment. Chronic degenerative/postsurgical changes as above not significantly changed in appearance compared to prior study dated 10/9/2023. Similar lucency surrounding bilateral S1 transpedicular screws concerning for loosening.    Plan:  Reviewed imaging extensively with patient.  Discussed findings on bilateral iliac bones concerning for malignancy.  He reports weight loss, lack of appetite, and occasional hot flashes/chills.  I have ordered CT pelvis w/wo contrast to further evaluate.   Follow up once imaging has been completed.  Of note, patient is not interested in any further surgery.         Diagnoses and all orders for this visit:    S/P lumbar  fusion  -     CT pelvis w wo contrast; Future  -     BUN/Creatinine Ratio; Future    Chronic bilateral low back pain without sciatica          I have spent a total time of 40 minutes on 01/10/24 in caring for this patient including Diagnostic results, Instructions for management, Patient and family education, Impressions, Counseling / Coordination of care, Documenting in the medical record, Reviewing / ordering tests, medicine, procedures  , Obtaining or reviewing history  , and Communicating with other healthcare professionals .      CHIEF COMPLAINT    No chief complaint on file.      HISTORY    History of Present Illness     68 y.o. year old male     With past medical history of diabetes, thyroid cancer, mitral valve prolapse, liver cirrhosis, hypertension, who presents for 6-month postoperative visit status post posterior T12-S1 fusion.  He continues to endorse that he has not been doing well since his surgery.  He describes constant sharp and shooting pain from his waist down his hamstrings into his ankles.  Pain has become so bad that he has difficulty walking and 4 months ago started using a wheelchair to mobilize.  He reports multiple falls and has been evaluated in the emergency department multiple times for this pain and because of falling.  He feels his legs are weak and he cannot stand for them for very long.  He is taking 6 ibuprofen at a time for pain as well as Tylenol and high doses of Robaxin.  He has chronic neuropathy at baseline.  He used to self cath but has not needed to recently and denies any bowel or bladder issues.  He does smoke marijuana for pain control which she has been doing for about 3 years.  He endorses weight loss over the past few months as well as occasional chills and hot flashes.  He also reports anorexia but he is unsure if this is secondary to the pain.  He is accompanied by his wife today.        See Discussion    REVIEW OF SYSTEMS    Review of Systems   Constitutional:   Positive for activity change (worn out), appetite change (no appetite) and fatigue.   HENT: Negative.     Eyes: Negative.    Respiratory: Negative.     Cardiovascular: Negative.    Gastrointestinal:  Positive for nausea.        Some pain with BM, hurts around coccyx to sit   Endocrine: Negative.    Genitourinary:  Positive for difficulty urinating (self cath does empty enough).        Self cath problems with prostate   Musculoskeletal:  Positive for arthralgias (cramps, shooting pains w/ movement), back pain (lower back pain (waist area) radiates side to sides down both buttock and all the way down his legs to ankles constant pain dull achey feeling to severe), gait problem (using cane, 2 falls since last seen hit back  1/10 frequent falls, last fall 1/8) and myalgias (muscle pain/tightness/spams in back ). Neck pain: sometimes feels a little pain .       6 WK POV,  L1-S1 JANY POST DECOMP/FUSION- W XRAY     rates pain today 10/10 MID to LBP w occasional b/l Leg pain   6 WK POV,  L1-S1 JANY POST DECOMP/FUSION- W XRAY   rates pain today 10/10 MID to LBP w occasional b/l Leg pain   patient is asking for Diluadi refill   pt did not complete xray    Skin: Negative.    Allergic/Immunologic: Negative.    Neurological:  Positive for weakness (sometimes in BLE) and numbness (sometimes in his feet from neuropathy  and sometimes in his arms + hands).   Hematological: Negative.    Psychiatric/Behavioral:  Positive for sleep disturbance (pain keeping him up at night).    All other systems reviewed and are negative.      ROS obtained by MA. Reviewed. See HPI.     Meds/Allergies     Current Outpatient Medications   Medication Sig Dispense Refill    ACCU-CHEK FABIANO PLUS test strip 4 (four) times a day  1    ACCU-CHEK FASTCLIX LANCETS MISC 4 (four) times a day  1    acetaminophen (TYLENOL) 500 mg tablet Take 1 tablet (500 mg total) by mouth every 6 (six) hours as needed for mild pain 500 mg tablet  0    CHOLECALCIFEROL PO Take 5,000  Units by mouth daily      clonazePAM (KlonoPIN) 1 mg tablet Take 1 mg by mouth 2 (two) times a day & 3rd pill PRN      finasteride (PROSCAR) 5 mg tablet Take 1 tablet (5 mg total) by mouth daily 90 tablet 3    fluticasone (FLONASE) 50 mcg/act nasal spray 1 spray into each nostril 2 (two) times a day      folic acid (FOLVITE) 1 mg tablet Take 2,000 mcg by mouth daily  6    GNP Aspirin Low Dose 81 MG EC tablet Take 1 tablet (81 mg total) by mouth daily Do not start before April 25, 2023.  0    Jardiance 10 MG TABS Take 10 mg by mouth every morning      ketoconazole (NIZORAL) 2 % shampoo Apply 1 application. topically daily Use as directed  6    lidocaine (LIDODERM) 5 % Apply 1 patch topically over 12 hours daily Remove & Discard patch within 12 hours or as directed by MD Do not start before October 12, 2023. 5 patch 0    losartan-hydrochlorothiazide (HYZAAR) 100-25 MG per tablet Take 1 tablet by mouth every morning  0    lovastatin (MEVACOR) 20 mg tablet Take 20 mg by mouth every morning  3    melatonin 3 mg Take 1 tablet (3 mg total) by mouth daily at bedtime 30 tablet 0    metFORMIN (GLUCOPHAGE) 1000 MG tablet Take 1,000 mg by mouth 2 (two) times a day with meals       methocarbamol (ROBAXIN) 750 mg tablet Take 1 tablet (750 mg total) by mouth every 8 (eight) hours as needed for muscle spasms 15 tablet 0    METOPROLOL SUCCINATE ER PO Take 75 mg by mouth every morning      mirtazapine (REMERON) 45 MG tablet Take 45 mg by mouth daily at bedtime  1    NIFEdipine (PROCARDIA XL) 30 mg 24 hr tablet Take 120 mg by mouth every morning Takes 90 mg and 30 mg =120 mg every AM      NIFEdipine (PROCARDIA XL) 90 mg 24 hr tablet Take 120 mg by mouth every morning Takes 90 mg and 30 mg =120 mg every AM  3    sertraline (ZOLOFT) 100 mg tablet Take 100 mg by mouth every morning      tamsulosin (FLOMAX) 0.4 mg Take 1 capsule (0.4 mg total) by mouth daily with dinner 90 capsule 3    gabapentin (NEURONTIN) 400 mg capsule Take 1 capsule  (400 mg total) by mouth daily at bedtime 30 capsule 0     No current facility-administered medications for this visit.       Allergies   Allergen Reactions    Abilify [Aripiprazole] Tremor     Shaking      Cephalexin Rash    Molds & Smuts Allergic Rhinitis    Pregabalin Tremor     Lyrica - shaking feeling       PAST HISTORY    Past Medical History:   Diagnosis Date    Anxiety     Arthritis     Cancer (HCC)     Depression     Diabetes mellitus (HCC)     Hypertension     Liver disease     Mitral valve prolapse     Peripheral neuropathy     Neuropathy    PONV (postoperative nausea and vomiting)     Thyroid disease        Past Surgical History:   Procedure Laterality Date    COLONOSCOPY      INCISION AND DRAINAGE OF WOUND Left 2022    Procedure: INCISION AND DRAINAGE (I&D) EXTREMITY;  Surgeon: Moustapha Cote DPM;  Location:  MAIN OR;  Service: Podiatry    JOINT REPLACEMENT Left 2022    Left TSA    IN ARTHRODESIS POSTERIOR/PSTLAT TQ 1NTRSPC LUMBAR Bilateral 2023    Procedure: L1-S1 navigated posterior decompression with instrumented fixation fusion;  Surgeon: James Moraes MD;  Location:  MAIN OR;  Service: Neurosurgery    THYROID SURGERY      remove cancer       Social History     Tobacco Use    Smoking status: Former     Current packs/day: 0.00     Average packs/day: 0.3 packs/day for 5.9 years (1.5 ttl pk-yrs)     Types: Cigarettes     Start date: 1975     Quit date: 1981     Years since quittin.6    Smokeless tobacco: Never    Tobacco comments:     quit    Vaping Use    Vaping status: Some Days   Substance Use Topics    Alcohol use: Yes     Alcohol/week: 4.0 - 7.0 standard drinks of alcohol     Types: 1 - 2 Glasses of wine, 2 - 3 Cans of beer, 1 - 2 Shots of liquor per week     Comment: only on special occasions    Drug use: Yes     Frequency: 7.0 times per week     Types: Marijuana     Comment: Medical Marijuana       Family History   Problem Relation Age of Onset    No  "Known Problems Father     No Known Problems Mother     Colon cancer Neg Hx          Above history personally reviewed.       EXAM    Vitals:Blood pressure 148/76, pulse 83, temperature 98.6 °F (37 °C), temperature source Temporal, height 5' 10\" (1.778 m), SpO2 97%.,Body mass index is 25.54 kg/m².     Physical Exam  Constitutional:       General: He is not in acute distress.     Appearance: He is well-developed. He is not diaphoretic.   Eyes:      General:         Right eye: No discharge.         Left eye: No discharge.      Extraocular Movements: EOM normal.      Conjunctiva/sclera: Conjunctivae normal.      Pupils: Pupils are equal, round, and reactive to light.   Pulmonary:      Effort: Pulmonary effort is normal. No respiratory distress.   Abdominal:      General: There is no distension.      Tenderness: There is no abdominal tenderness.   Musculoskeletal:         General: Normal range of motion.      Cervical back: Normal range of motion and neck supple.   Skin:     General: Skin is warm and dry.   Neurological:      Mental Status: He is alert and oriented to person, place, and time.      Cranial Nerves: No cranial nerve deficit.      Motor: Weakness present.      Coordination: Finger-Nose-Finger Test normal.      Deep Tendon Reflexes: Reflexes normal.      Reflex Scores:       Tricep reflexes are 1+ on the right side and 1+ on the left side.       Bicep reflexes are 1+ on the right side and 1+ on the left side.       Brachioradialis reflexes are 1+ on the right side and 1+ on the left side.       Patellar reflexes are 1+ on the right side and 1+ on the left side.       Achilles reflexes are 1+ on the right side and 1+ on the left side.  Psychiatric:         Speech: Speech normal.         Behavior: Behavior normal.         Thought Content: Thought content normal.         Judgment: Judgment normal.         Neurologic Exam     Mental Status   Oriented to person, place, and time.   Oriented to person.   Oriented to " place.   Oriented to time. Oriented to year, month and date.   Attention: normal. Concentration: normal.   Speech: speech is normal   Level of consciousness: alert    Cranial Nerves     CN III, IV, VI   Pupils are equal, round, and reactive to light.  Extraocular motions are normal.   Right pupil: Size: 3 mm. Shape: regular. Reactivity: brisk. Consensual response: intact. Accommodation: intact.   Left pupil: Size: 3 mm. Shape: regular. Reactivity: brisk. Consensual response: intact. Accommodation: intact.   Nystagmus: none   Diplopia: none  Conjugate gaze: present    CN V   Right facial sensation deficit: none  Left facial sensation deficit: none    CN VII   Facial expression full, symmetric.     CN VIII   Hearing: intact    CN IX, X   Palate: symmetric    CN XI   Right sternocleidomastoid strength: normal  Left sternocleidomastoid strength: normal  Right trapezius strength: normal  Left trapezius strength: normal    CN XII   Tongue: not atrophic  Fasciculations: absent  Tongue deviation: none    Motor Exam   Muscle bulk: normal  Overall muscle tone: normal  Right arm pronator drift: absent  Left arm pronator drift: absent    Strength   Right deltoid: 5/5  Left deltoid: 5/5  Right biceps: 5/5  Left biceps: 5/5  Right triceps: 5/5  Left triceps: 5/5  Right wrist flexion: 5/5  Left wrist flexion: 5/5  Right wrist extension: 5/5  Left wrist extension: 5/5  Right interossei: 5/5  Left interossei: 5/5  Right iliopsoas: 3/5  Left iliopsoas: 3/5  Right quadriceps: 3/5  Left quadriceps: 3/5  Right hamstrin/5  Left hamstrin/5  Right glutei: 4/5  Left glutei: 4/5  Right anterior tibial: 4/5  Left anterior tibial: 4/5  Right posterior tibial: 4/5  Left posterior tibial: 4/5  Right peroneal: 4/5  Left peroneal: 4/5  Right gastroc: 4/5  Left gastroc: 4/5    Sensory Exam   Right leg light touch: decreased from toes  Left leg light touch: decreased from toes  Proprioception normal.     Gait, Coordination, and Reflexes      Coordination   Finger to nose coordination: normal    Tremor   Resting tremor: absent  Intention tremor: absent  Action tremor: absent    Reflexes   Right brachioradialis: 1+  Left brachioradialis: 1+  Right biceps: 1+  Left biceps: 1+  Right triceps: 1+  Left triceps: 1+  Right patellar: 1+  Left patellar: 1+  Right achilles: 1+  Left achilles: 1+  Right : 1+  Left : 1+        MEDICAL DECISION MAKING    Imaging Studies:     CT spine lumbar without contrast    Result Date: 1/2/2024  Narrative: CT LUMBAR SPINE WITHOUT CONTRAST INDICATION:   Low back pain, trauma fall. COMPARISON: 10/9/2023. TECHNIQUE:  Contiguous axial images through the lumbar spine were obtained. Sagittal and coronal reconstructions were performed. Radiation dose length product (DLP) for this visit:  631.01 mGy-cm .  This examination, like all CT scans performed in the [facility], was performed utilizing techniques to minimize radiation dose exposure, including the use of iterative  reconstruction and automated exposure control. IMAGE QUALITY:  Diagnostic. FINDINGS: ALIGNMENT:  There are 5 lumbar type vertebral bodies. Stable alignment including mild grade 1 retrolisthesis L1 upon L2 and L5 upon S1, favor degenerative in etiology. VERTEBRAE:  No fracture.  No lytic or blastic lesion. Similar multilevel central height loss again like on the basis of discogenic degenerative changes and Schmorl's nodes. Postsurgical changes of prior multilevel laminectomy and T12-S1 posterior fusion reidentified, hardware grossly intact. Similar lucency surrounding the bilateral transpedicular screws at S1. DEGENERATIVE CHANGES: Evaluation of the central canal limited secondary to artifact from patient's surgical hardware. Lower Thoracic spine: Similar discogenic degenerative changes without significant bony central canal narrowing. L1-2: Decompression of the central canal. Facet arthrosis. Similar moderate severe bilateral neuroforaminal  narrowing. L2-3: Decompression of the central canal. Facet arthrosis. Similar mild neuroforaminal narrowing, left greater than. L3-4: Decompression of the central canal. Facet arthrosis. Similar mild right neuroforaminal narrowing. L4-5: Similar posterior disc osteophyte complex and diffuse disc bulge. Facet arthrosis. Similar severe bilateral neuroforaminal narrowing. L5-S1: Similar posterior disc osteophyte complex and disc bulge. Facet arthrosis. No significant central canal narrowing. Similar severe bilateral neuroforaminal narrowing, worse on the right. PARASPINAL SOFT TISSUES:   Normal.     Impression: No acute fracture or traumatic malalignment. Chronic degenerative/postsurgical changes as above not significantly changed in appearance compared to prior study dated 10/9/2023. Similar lucency surrounding bilateral S1 transpedicular screws concerning for loosening. Workstation performed: [id number]       I have personally reviewed pertinent reports.   and I have personally reviewed pertinent films in PACS

## 2024-01-11 ENCOUNTER — OFFICE VISIT (OUTPATIENT)
Dept: PHYSICAL THERAPY | Facility: CLINIC | Age: 69
End: 2024-01-11
Payer: COMMERCIAL

## 2024-01-11 DIAGNOSIS — M47.816 LUMBAR SPONDYLOSIS: Primary | ICD-10-CM

## 2024-01-11 DIAGNOSIS — Z98.1 STATUS POST LUMBAR SPINAL FUSION: ICD-10-CM

## 2024-01-11 PROCEDURE — 97110 THERAPEUTIC EXERCISES: CPT

## 2024-01-11 NOTE — PROGRESS NOTES
"Daily Note     Today's date: 2024  Patient name: Juan San  : 1955  MRN: [id number]  Referring provider: James Moraes MD  Dx:   Encounter Diagnosis     ICD-10-CM    1. Lumbar spondylosis  M47.816       2. Status post lumbar spinal fusion  Z98.1                      Subjective: Patient reports he fell last week down 8 steps onto his R hip and back. Had Xrays done and fusion is good but found 4 \"lesions\" that they will be doing further testing on in the coming weeks.       Objective: See treatment diary below      Assessment: Patient tolerated treatment fair. Continues to have difficulty ambulating due to low back pain and now R hip pain. Uses RW at all times. He has difficulty balancing due to pain on R side but was able to hold on to complete standing TE's in efforts to get him to start WB on involved R side more.     Plan: progress per POC as tolerated     Precautions: posterior fusion L1-S1 on 2023; hx of cancer; fall risk  Functional Limitations: walking, stairs, dressing, ADLs  Impairments: lumbar and hip ROM, BLE strength, TUG time  Dialogic Code: [email or web address]   POC expiration: 2024    Manuals    STM lumbar paraspinals sitting          Bilateral hip PROM          Bilateral hip LAD                    Neuro Re-Ed          hooklying TA          hooklying TA / march          Standing pball press          Std perturbations in romberg          STD rows with cues for balance    nv       Standing BLE stance no BUE support   nv       Iso ADD/ABD with TA Seated 10\"x10 ea Seated 10\"x10 ea Seated 10\"x10 Seated 10\"X10 Seated 10\"x10 Seated 10\"x10 Seated 10\"x10   Ther Ex          bike 6'  6' 6'      LAQ    5# x20 5# 2x10  10x2   Seated march       10x2   Seated lumbar flexion   20x 10x 10x 10x 10x2   LTR          Sit to stand          SKTC          Side stepping 3 laps  3 laps 3 laps 1.5#      Standing knee flexion          walking march Standing 1.5# 2x20 " "Standing 1.5# 2x20 Standing 1.5# 2x20 Standing 1.5# 2x20  Standing 20x Std  x20   HR       X10   Seated hamstring stretch 3x30\"         Seated 3 way pball rollout 5x5\"ea 5x5\" ea 5\"x5 ea 5\"x5ea 10ea 5\"x5ea 5\"x5 ea   Leg press          Mini squat          Standing hip flex and abd 1.5# 1abd, ext 10 ea 1.5# abd, ext 10 ea 1.5# abd 1.5# abd 20ea  Abd 2x10 Abd 10x2   Ther Activity          Manual push of therapist in // bars           Fwd step up          Lateral step up          Gait Training                              Modalities                                     "

## 2024-01-16 ENCOUNTER — APPOINTMENT (OUTPATIENT)
Dept: PHYSICAL THERAPY | Facility: CLINIC | Age: 69
End: 2024-01-16
Payer: COMMERCIAL

## 2024-01-18 ENCOUNTER — EVALUATION (OUTPATIENT)
Dept: PHYSICAL THERAPY | Facility: CLINIC | Age: 69
End: 2024-01-18
Payer: COMMERCIAL

## 2024-01-18 DIAGNOSIS — M48.062 SPINAL STENOSIS OF LUMBAR REGION WITH NEUROGENIC CLAUDICATION: ICD-10-CM

## 2024-01-18 DIAGNOSIS — G89.29 CHRONIC BILATERAL LOW BACK PAIN WITHOUT SCIATICA: ICD-10-CM

## 2024-01-18 DIAGNOSIS — M54.50 CHRONIC BILATERAL LOW BACK PAIN WITHOUT SCIATICA: ICD-10-CM

## 2024-01-18 DIAGNOSIS — M54.16 LUMBAR RADICULOPATHY: ICD-10-CM

## 2024-01-18 DIAGNOSIS — M47.816 LUMBAR SPONDYLOSIS: Primary | ICD-10-CM

## 2024-01-18 DIAGNOSIS — Z98.1 STATUS POST LUMBAR SPINAL FUSION: ICD-10-CM

## 2024-01-18 PROCEDURE — 97112 NEUROMUSCULAR REEDUCATION: CPT

## 2024-01-18 PROCEDURE — 97110 THERAPEUTIC EXERCISES: CPT

## 2024-01-18 NOTE — PROGRESS NOTES
PT Re-Evaluation     Today's date: 2024  Patient name: Juan San  : 1955  MRN: [id number]  Referring provider: James Moraes MD  Dx:   Encounter Diagnosis     ICD-10-CM    1. Lumbar spondylosis  M47.816       2. Status post lumbar spinal fusion  Z98.1       3. Lumbar radiculopathy  M54.16       4. Spinal stenosis of lumbar region with neurogenic claudication  M48.062       5. Chronic bilateral low back pain without sciatica  M54.50     G89.29                      Assessment  Assessment details: Juan San is a pleasant 68 y.o. male who presents with low back pain with intermittent bilateral leg pain s/p L1-S1 posterior fusion on 2023. He presents with decreased hip and lumbar mobility and generalized bilateral LE weakness. He demonstrates high risk for falls with TUG time of 40 seconds with SPC. He was educated on use of a walker rather than SPC for safety with walking as he has reported multiple falls in the past year.  These signs and symptoms are consistent with Status post lumbar spinal fusion, Chronic bilateral low back pain without sciatica, Spinal stenosis of lumbar region with neurogenic claudication, Lumbar spondylosis, Lumbar radiculopathy. He will benefit from skilled PT to address impairments and return to maximum level of function as well as reduce risk for falls. At this time no referral is necessary based on examination.     10/24/2023: Patient has made progress in BLE strength and lumbar flexion ROM but continues to have pain in lumbar region and BLE. He is limited with ADLs, walking, dressing due to decreased strength and core control. He would benefit from skilled PT to maximize overall function and manage pain.     2023: Patient demonstrates no significant change in BLE strength, a mild improvement in mobility, and continued decreased balance. These impairments are limiting him with walking with SPC, ADLs, dressing, household chores. He will benefit from skilled PT  for an additional 4 weeks to address impairments and return to maximum level of function and progress HEP independence. Education given on prognosis      1/18/2024: Patient did not attend PT from 12/19 through 1/11 due to family events and illness. He ambulates with a SPC on L side but when use of cane on R side, he has a trendelenberg gait during RLE stance phase. He has mild strength deficits in BLE and decreased balance. He will benefit from skilled PT to address impairments and return to PLOF.     Impairments: abnormal or restricted ROM, activity intolerance, impaired balance, impaired physical strength, pain with function and poor posture   Prognosis details: Positive prognostic indicators include positive attitude toward recovery, motivated to improve, high self-efficacy, good understanding of condition, realistic expectations.  Negative prognostic indicators include chronicity of symptoms, high symptom irritability.      Goals  STG to be achieved in 6 weeks  Patient will have improved lumbar ROM to moderate limitation.(Met)  Patient will have improved gross BLE strength to 4/5(met)  Patient will have improved TUG time with SPC to 25 seconds(not assessed)  Patient will be able to walk household distances with less difficulty(some progress)  Patient will be independent with HEP.    LTG to be achieved in 12 weeks  Patient will be able to walk community distances with less difficulty  Patient will be able to ascend/descend stairs with less difficulty (some progress  Patient will be able to dress independently (good progress)      Plan  Patient would benefit from: skilled physical therapy  Planned modality interventions: cryotherapy and thermotherapy: hydrocollator packs  Planned therapy interventions: manual therapy, joint mobilization, neuromuscular re-education, therapeutic exercise, therapeutic activities, strengthening, stretching, home exercise program, gait training, functional ROM exercises, body mechanics  training, balance, flexibility and patient education  Frequency: 2x week  Plan of Care beginning date: 1/2/2024  Plan of Care expiration date: 3/14/2024  Treatment plan discussed with: patient        Subjective Evaluation    History of Present Illness  Date of surgery: 4/11/2023  Mechanism of injury: Patient reports back pain and bilateral leg pain for at least 2 years. He had tried conservative care like injections, etc prior to surgery. He had a posterior fusion of L1-S1 on 4/11/2023. Since surgery, he has had so much back pain and intermittent pain in bilateral legs.      Patient did have home PT up until 2 weeks ago for 5-6 visit.     Has difficulty with dressing lower body, walking, going up and down the stairs, ADLs. Notes falling frequently.    10/24/2023: Patient continues to have low back pain with pain down back of legs. He uses cane and walker to walk but has difficulty due to LBP Continued difficulty with dressing, ADLs, etc.     12/5/2023: Patient reports fatigue and significant pain upon arrival today. Has some days that are better than others. Continued pain in back, legs that limits him with walking, dressing, ADLs    1/18/2024: Patient reports still having LBP. Notes using walker if off balance - is using the cane today. He reports he fell a few weeks ago. Is being tested next week for possible lesions seen in spine. Notes difficulty getting around his home - notes they are getting somebody to help him during the day while wife is at work  Patient Goals  Patient goal: Patient would like to be able to manage back and leg pain and improve overall function         Objective     Observations     Additional Observation Details  Trendelenberg when standing on RLE     Tenderness     Additional Tenderness Details  TTP to thoracolumbar paraspinals    Active Range of Motion     Lumbar   Flexion:  Restriction level: moderate  Extension:  Restriction level: minimal  Left Hip   External rotation (90/90): 30  "degrees with pain  Internal rotation (90/90): 20 degrees with pain    Right Hip   External rotation (90/90): 26 degrees with pain  Internal rotation (90/90): 20 degrees with pain    Strength/Myotome Testing     Left Hip   Planes of Motion   Flexion: 4  Abduction: 4-  Adduction: 4+    Right Hip   Planes of Motion   Flexion: 4  Abduction: 4-  Adduction: 4+    Left Knee   Flexion: 4-  Extension: 4+    Right Knee   Flexion: 4-  Extension: 4+    Left Ankle/Foot   Dorsiflexion: 4-    Right Ankle/Foot   Dorsiflexion: 4-    Functional Assessment        Comments  TUG w/ SPC on 8/17/2023: 40 seconds             Precautions: posterior fusion L1-S1 on 4/11/2023; hx of cancer; fall risk  Functional Limitations: walking, stairs, dressing, ADLs  Impairments: lumbar and hip ROM, BLE strength, TUG time  [facility] Code: [id number]   POC expiration: 3/14/2024    Manuals 11/30 12/5 12/19 1/11 1/18     STM lumbar paraspinals sitting          Bilateral hip PROM          Bilateral hip LAD                    Neuro Re-Ed          hooklying TA          hooklying TA w/ march          Standing pball press          Std perturbations in romberg          STD rows with cues for balance           Standing BLE stance no BUE support          Iso ADD/ABD with TA Seated 10\"X10 Seated 10\"x10 Seated 10\"x10 Seated 10\"x10 Seated 10\"x10     Ther Ex          bike 6'    6'     LAQ 5# x20 5# 2x10  10x2      Seated march    10x2 20x     Seated lumbar flexion 10x 10x 10x 10x2 10x     LTR          Sit to stand          SKTC          Side stepping 3 laps 1.5#         Standing knee flexion          walking march Standing 1.5# 2x20  Standing 20x Std  x20      HR    X10      Seated hamstring stretch          Seated 3 way pball rollout 5\"x5ea 10ea 5\"x5ea 5\"x5 ea 5\"x5ea     Leg press          Mini squat          Standing hip flex and abd 1.5# abd 20ea  Abd 2x10 Abd 10x2 Abd 20x     Ther Activity          Manual push of therapist in // bars           Fwd step up        "   Lateral step up          Gait Training                              Modalities

## 2024-01-23 ENCOUNTER — APPOINTMENT (OUTPATIENT)
Dept: LAB | Facility: HOSPITAL | Age: 69
DRG: 552 | End: 2024-01-23
Payer: COMMERCIAL

## 2024-01-23 ENCOUNTER — APPOINTMENT (OUTPATIENT)
Dept: PHYSICAL THERAPY | Facility: CLINIC | Age: 69
End: 2024-01-23
Payer: COMMERCIAL

## 2024-01-23 DIAGNOSIS — Z98.1 S/P LUMBAR FUSION: ICD-10-CM

## 2024-01-23 LAB
BUN SERPL-MCNC: 16 MG/DL (ref 5–25)
CREAT SERPL-MCNC: 0.82 MG/DL (ref 0.6–1.3)
GFR SERPL CREATININE-BSD FRML MDRD: 92 ML/MIN/1.73SQ M

## 2024-01-23 PROCEDURE — 36415 COLL VENOUS BLD VENIPUNCTURE: CPT

## 2024-01-23 PROCEDURE — 84520 ASSAY OF UREA NITROGEN: CPT

## 2024-01-23 PROCEDURE — 82565 ASSAY OF CREATININE: CPT

## 2024-01-24 ENCOUNTER — HOSPITAL ENCOUNTER (OUTPATIENT)
Dept: CT IMAGING | Facility: HOSPITAL | Age: 69
Discharge: HOME | End: 2024-01-24
Payer: COMMERCIAL

## 2024-01-24 ENCOUNTER — APPOINTMENT (EMERGENCY)
Dept: CT IMAGING | Facility: HOSPITAL | Age: 69
DRG: 552 | End: 2024-01-24
Payer: COMMERCIAL

## 2024-01-24 ENCOUNTER — APPOINTMENT (EMERGENCY)
Dept: MRI IMAGING | Facility: HOSPITAL | Age: 69
DRG: 552 | End: 2024-01-24
Payer: COMMERCIAL

## 2024-01-24 ENCOUNTER — HOSPITAL ENCOUNTER (INPATIENT)
Facility: HOSPITAL | Age: 69
LOS: 13 days | Discharge: HOME HEALTH | DRG: 552 | End: 2024-02-06
Attending: EMERGENCY MEDICINE | Admitting: INTERNAL MEDICINE
Payer: COMMERCIAL

## 2024-01-24 DIAGNOSIS — G89.29 ACUTE EXACERBATION OF CHRONIC LOW BACK PAIN: Primary | ICD-10-CM

## 2024-01-24 DIAGNOSIS — M48.061 FORAMINAL STENOSIS OF LUMBAR REGION: ICD-10-CM

## 2024-01-24 DIAGNOSIS — M54.42 CHRONIC BILATERAL LOW BACK PAIN WITH BILATERAL SCIATICA: ICD-10-CM

## 2024-01-24 DIAGNOSIS — M54.50 ACUTE EXACERBATION OF CHRONIC LOW BACK PAIN: Primary | ICD-10-CM

## 2024-01-24 DIAGNOSIS — M86.9 OSTEOMYELITIS (HCC): ICD-10-CM

## 2024-01-24 DIAGNOSIS — M46.40 DISCITIS: ICD-10-CM

## 2024-01-24 DIAGNOSIS — G89.29 CHRONIC BILATERAL LOW BACK PAIN WITH BILATERAL SCIATICA: ICD-10-CM

## 2024-01-24 DIAGNOSIS — M54.41 CHRONIC BILATERAL LOW BACK PAIN WITH BILATERAL SCIATICA: ICD-10-CM

## 2024-01-24 DIAGNOSIS — Z98.1 S/P LUMBAR FUSION: ICD-10-CM

## 2024-01-24 DIAGNOSIS — M46.47 DISCITIS OF LUMBOSACRAL REGION: ICD-10-CM

## 2024-01-24 DIAGNOSIS — E11.65 TYPE 2 DIABETES MELLITUS WITH HYPERGLYCEMIA, WITHOUT LONG-TERM CURRENT USE OF INSULIN (HCC): ICD-10-CM

## 2024-01-24 LAB
ALBUMIN SERPL BCP-MCNC: 3.9 G/DL (ref 3.5–5)
ALP SERPL-CCNC: 71 U/L (ref 34–104)
ALT SERPL W P-5'-P-CCNC: 13 U/L (ref 7–52)
ANION GAP SERPL CALCULATED.3IONS-SCNC: 7 MMOL/L
AST SERPL W P-5'-P-CCNC: 16 U/L (ref 13–39)
BASOPHILS # BLD AUTO: 0.06 THOUSANDS/ÂΜL (ref 0–0.1)
BASOPHILS NFR BLD AUTO: 1 % (ref 0–1)
BILIRUB SERPL-MCNC: 0.22 MG/DL (ref 0.2–1)
BUN SERPL-MCNC: 13 MG/DL (ref 5–25)
CALCIUM SERPL-MCNC: 9.2 MG/DL (ref 8.4–10.2)
CHLORIDE SERPL-SCNC: 97 MMOL/L (ref 96–108)
CO2 SERPL-SCNC: 33 MMOL/L (ref 21–32)
CREAT SERPL-MCNC: 0.75 MG/DL (ref 0.6–1.3)
CRP SERPL QL: 31.9 MG/L
EOSINOPHIL # BLD AUTO: 0.26 THOUSAND/ÂΜL (ref 0–0.61)
EOSINOPHIL NFR BLD AUTO: 3 % (ref 0–6)
ERYTHROCYTE [DISTWIDTH] IN BLOOD BY AUTOMATED COUNT: 18.1 % (ref 11.6–15.1)
ERYTHROCYTE [SEDIMENTATION RATE] IN BLOOD: 41 MM/HOUR (ref 0–19)
EST. AVERAGE GLUCOSE BLD GHB EST-MCNC: 189 MG/DL
GFR SERPL CREATININE-BSD FRML MDRD: 95 ML/MIN/1.73SQ M
GLUCOSE SERPL-MCNC: 123 MG/DL (ref 65–140)
GLUCOSE SERPL-MCNC: 129 MG/DL (ref 65–140)
GLUCOSE SERPL-MCNC: 195 MG/DL (ref 65–140)
HBA1C MFR BLD: 8.2 %
HCT VFR BLD AUTO: 38.1 % (ref 36.5–49.3)
HGB BLD-MCNC: 11.4 G/DL (ref 12–17)
IMM GRANULOCYTES # BLD AUTO: 0.06 THOUSAND/UL (ref 0–0.2)
IMM GRANULOCYTES NFR BLD AUTO: 1 % (ref 0–2)
LACTATE SERPL-SCNC: 1.6 MMOL/L (ref 0.5–2)
LYMPHOCYTES # BLD AUTO: 1.79 THOUSANDS/ÂΜL (ref 0.6–4.47)
LYMPHOCYTES NFR BLD AUTO: 18 % (ref 14–44)
MCH RBC QN AUTO: 24.4 PG (ref 26.8–34.3)
MCHC RBC AUTO-ENTMCNC: 29.9 G/DL (ref 31.4–37.4)
MCV RBC AUTO: 82 FL (ref 82–98)
MONOCYTES # BLD AUTO: 0.61 THOUSAND/ÂΜL (ref 0.17–1.22)
MONOCYTES NFR BLD AUTO: 6 % (ref 4–12)
NEUTROPHILS # BLD AUTO: 7.31 THOUSANDS/ÂΜL (ref 1.85–7.62)
NEUTS SEG NFR BLD AUTO: 71 % (ref 43–75)
NRBC BLD AUTO-RTO: 0 /100 WBCS
PLATELET # BLD AUTO: 373 THOUSANDS/UL (ref 149–390)
PMV BLD AUTO: 9.7 FL (ref 8.9–12.7)
POTASSIUM SERPL-SCNC: 3.7 MMOL/L (ref 3.5–5.3)
PROT SERPL-MCNC: 7.3 G/DL (ref 6.4–8.4)
RBC # BLD AUTO: 4.67 MILLION/UL (ref 3.88–5.62)
SODIUM SERPL-SCNC: 137 MMOL/L (ref 135–147)
WBC # BLD AUTO: 10.09 THOUSAND/UL (ref 4.31–10.16)

## 2024-01-24 PROCEDURE — 96372 THER/PROPH/DIAG INJ SC/IM: CPT

## 2024-01-24 PROCEDURE — 85025 COMPLETE CBC W/AUTO DIFF WBC: CPT | Performed by: EMERGENCY MEDICINE

## 2024-01-24 PROCEDURE — A9585 GADOBUTROL INJECTION: HCPCS | Performed by: EMERGENCY MEDICINE

## 2024-01-24 PROCEDURE — G1004 CDSM NDSC: HCPCS

## 2024-01-24 PROCEDURE — 83036 HEMOGLOBIN GLYCOSYLATED A1C: CPT | Performed by: PHYSICIAN ASSISTANT

## 2024-01-24 PROCEDURE — 99284 EMERGENCY DEPT VISIT MOD MDM: CPT

## 2024-01-24 PROCEDURE — 80053 COMPREHEN METABOLIC PANEL: CPT | Performed by: EMERGENCY MEDICINE

## 2024-01-24 PROCEDURE — 72158 MRI LUMBAR SPINE W/O & W/DYE: CPT

## 2024-01-24 PROCEDURE — 83605 ASSAY OF LACTIC ACID: CPT | Performed by: EMERGENCY MEDICINE

## 2024-01-24 PROCEDURE — 72131 CT LUMBAR SPINE W/O DYE: CPT

## 2024-01-24 PROCEDURE — 96374 THER/PROPH/DIAG INJ IV PUSH: CPT

## 2024-01-24 PROCEDURE — 86140 C-REACTIVE PROTEIN: CPT | Performed by: EMERGENCY MEDICINE

## 2024-01-24 PROCEDURE — 85652 RBC SED RATE AUTOMATED: CPT | Performed by: EMERGENCY MEDICINE

## 2024-01-24 PROCEDURE — 36415 COLL VENOUS BLD VENIPUNCTURE: CPT | Performed by: EMERGENCY MEDICINE

## 2024-01-24 PROCEDURE — 82948 REAGENT STRIP/BLOOD GLUCOSE: CPT

## 2024-01-24 PROCEDURE — 99223 1ST HOSP IP/OBS HIGH 75: CPT | Performed by: PHYSICIAN ASSISTANT

## 2024-01-24 PROCEDURE — 72192 CT PELVIS W/O DYE: CPT

## 2024-01-24 PROCEDURE — 87040 BLOOD CULTURE FOR BACTERIA: CPT | Performed by: EMERGENCY MEDICINE

## 2024-01-24 PROCEDURE — 96375 TX/PRO/DX INJ NEW DRUG ADDON: CPT

## 2024-01-24 PROCEDURE — 96376 TX/PRO/DX INJ SAME DRUG ADON: CPT

## 2024-01-24 PROCEDURE — 72193 CT PELVIS W/DYE: CPT

## 2024-01-24 PROCEDURE — 99285 EMERGENCY DEPT VISIT HI MDM: CPT | Performed by: EMERGENCY MEDICINE

## 2024-01-24 RX ORDER — CLONAZEPAM 1 MG/1
1 TABLET ORAL 2 TIMES DAILY PRN
Status: DISCONTINUED | OUTPATIENT
Start: 2024-01-24 | End: 2024-01-28

## 2024-01-24 RX ORDER — OXYCODONE HYDROCHLORIDE 5 MG/1
5 TABLET ORAL EVERY 4 HOURS PRN
Status: DISCONTINUED | OUTPATIENT
Start: 2024-01-24 | End: 2024-02-06 | Stop reason: HOSPADM

## 2024-01-24 RX ORDER — MAGNESIUM HYDROXIDE/ALUMINUM HYDROXICE/SIMETHICONE 120; 1200; 1200 MG/30ML; MG/30ML; MG/30ML
30 SUSPENSION ORAL EVERY 6 HOURS PRN
Status: DISCONTINUED | OUTPATIENT
Start: 2024-01-24 | End: 2024-02-06 | Stop reason: HOSPADM

## 2024-01-24 RX ORDER — LOSARTAN POTASSIUM 50 MG/1
100 TABLET ORAL DAILY
Status: DISCONTINUED | OUTPATIENT
Start: 2024-01-24 | End: 2024-02-06 | Stop reason: HOSPADM

## 2024-01-24 RX ORDER — FOLIC ACID 1 MG/1
2000 TABLET ORAL DAILY
Status: DISCONTINUED | OUTPATIENT
Start: 2024-01-24 | End: 2024-02-06 | Stop reason: HOSPADM

## 2024-01-24 RX ORDER — METHOCARBAMOL 500 MG/1
500 TABLET, FILM COATED ORAL ONCE
Status: COMPLETED | OUTPATIENT
Start: 2024-01-24 | End: 2024-01-24

## 2024-01-24 RX ORDER — HEPARIN SODIUM 5000 [USP'U]/ML
5000 INJECTION, SOLUTION INTRAVENOUS; SUBCUTANEOUS EVERY 8 HOURS SCHEDULED
Status: DISCONTINUED | OUTPATIENT
Start: 2024-01-24 | End: 2024-01-29

## 2024-01-24 RX ORDER — INSULIN LISPRO 100 [IU]/ML
1-6 INJECTION, SOLUTION INTRAVENOUS; SUBCUTANEOUS
Status: DISCONTINUED | OUTPATIENT
Start: 2024-01-24 | End: 2024-02-06 | Stop reason: HOSPADM

## 2024-01-24 RX ORDER — ONDANSETRON 2 MG/ML
4 INJECTION INTRAMUSCULAR; INTRAVENOUS EVERY 6 HOURS PRN
Status: DISCONTINUED | OUTPATIENT
Start: 2024-01-24 | End: 2024-02-06 | Stop reason: HOSPADM

## 2024-01-24 RX ORDER — HYDROCHLOROTHIAZIDE 25 MG/1
25 TABLET ORAL EVERY MORNING
Status: DISCONTINUED | OUTPATIENT
Start: 2024-01-25 | End: 2024-01-24 | Stop reason: SDUPTHER

## 2024-01-24 RX ORDER — GABAPENTIN 400 MG/1
400 CAPSULE ORAL 2 TIMES DAILY
Status: DISCONTINUED | OUTPATIENT
Start: 2024-01-24 | End: 2024-02-06 | Stop reason: HOSPADM

## 2024-01-24 RX ORDER — KETOROLAC TROMETHAMINE 30 MG/ML
15 INJECTION, SOLUTION INTRAMUSCULAR; INTRAVENOUS ONCE
Status: COMPLETED | OUTPATIENT
Start: 2024-01-24 | End: 2024-01-24

## 2024-01-24 RX ORDER — CLONAZEPAM 1 MG/1
1 TABLET ORAL 2 TIMES DAILY
Status: DISCONTINUED | OUTPATIENT
Start: 2024-01-24 | End: 2024-01-24

## 2024-01-24 RX ORDER — HYDROXYZINE HYDROCHLORIDE 25 MG/1
25 TABLET, FILM COATED ORAL ONCE
Status: COMPLETED | OUTPATIENT
Start: 2024-01-24 | End: 2024-01-24

## 2024-01-24 RX ORDER — SERTRALINE HYDROCHLORIDE 100 MG/1
100 TABLET, FILM COATED ORAL EVERY MORNING
Status: DISCONTINUED | OUTPATIENT
Start: 2024-01-25 | End: 2024-01-24

## 2024-01-24 RX ORDER — GADOBUTROL 604.72 MG/ML
8 INJECTION INTRAVENOUS
Status: COMPLETED | OUTPATIENT
Start: 2024-01-24 | End: 2024-01-24

## 2024-01-24 RX ORDER — CLONAZEPAM 0.5 MG/1
1 TABLET ORAL ONCE
Status: COMPLETED | OUTPATIENT
Start: 2024-01-24 | End: 2024-01-24

## 2024-01-24 RX ORDER — LANOLIN ALCOHOL/MO/W.PET/CERES
3 CREAM (GRAM) TOPICAL
Status: DISCONTINUED | OUTPATIENT
Start: 2024-01-24 | End: 2024-02-06 | Stop reason: HOSPADM

## 2024-01-24 RX ORDER — FINASTERIDE 5 MG/1
5 TABLET, FILM COATED ORAL DAILY
Status: DISCONTINUED | OUTPATIENT
Start: 2024-01-24 | End: 2024-02-06 | Stop reason: HOSPADM

## 2024-01-24 RX ORDER — HYDROCHLOROTHIAZIDE 25 MG/1
25 TABLET ORAL DAILY
Status: DISCONTINUED | OUTPATIENT
Start: 2024-01-24 | End: 2024-02-06 | Stop reason: HOSPADM

## 2024-01-24 RX ORDER — MORPHINE SULFATE 4 MG/ML
4 INJECTION, SOLUTION INTRAMUSCULAR; INTRAVENOUS ONCE
Status: COMPLETED | OUTPATIENT
Start: 2024-01-24 | End: 2024-01-24

## 2024-01-24 RX ORDER — ACETAMINOPHEN 325 MG/1
650 TABLET ORAL EVERY 6 HOURS PRN
Status: DISCONTINUED | OUTPATIENT
Start: 2024-01-24 | End: 2024-02-06 | Stop reason: HOSPADM

## 2024-01-24 RX ORDER — OXYCODONE HYDROCHLORIDE 10 MG/1
10 TABLET ORAL EVERY 4 HOURS PRN
Status: DISCONTINUED | OUTPATIENT
Start: 2024-01-24 | End: 2024-02-06 | Stop reason: HOSPADM

## 2024-01-24 RX ORDER — NIFEDIPINE 30 MG/1
120 TABLET, EXTENDED RELEASE ORAL EVERY MORNING
Status: DISCONTINUED | OUTPATIENT
Start: 2024-01-25 | End: 2024-01-24 | Stop reason: SDUPTHER

## 2024-01-24 RX ORDER — NIFEDIPINE 30 MG/1
120 TABLET, EXTENDED RELEASE ORAL DAILY
Status: DISCONTINUED | OUTPATIENT
Start: 2024-01-24 | End: 2024-02-06 | Stop reason: HOSPADM

## 2024-01-24 RX ORDER — SERTRALINE HYDROCHLORIDE 100 MG/1
100 TABLET, FILM COATED ORAL DAILY
Status: DISCONTINUED | OUTPATIENT
Start: 2024-01-24 | End: 2024-02-06 | Stop reason: HOSPADM

## 2024-01-24 RX ORDER — FLUTICASONE PROPIONATE 50 MCG
1 SPRAY, SUSPENSION (ML) NASAL DAILY PRN
Status: DISCONTINUED | OUTPATIENT
Start: 2024-01-24 | End: 2024-02-06 | Stop reason: HOSPADM

## 2024-01-24 RX ORDER — PRAVASTATIN SODIUM 20 MG
20 TABLET ORAL
Status: DISCONTINUED | OUTPATIENT
Start: 2024-01-24 | End: 2024-02-06 | Stop reason: HOSPADM

## 2024-01-24 RX ORDER — ACETAMINOPHEN 325 MG/1
975 TABLET ORAL ONCE
Status: COMPLETED | OUTPATIENT
Start: 2024-01-24 | End: 2024-01-24

## 2024-01-24 RX ORDER — TAMSULOSIN HYDROCHLORIDE 0.4 MG/1
0.4 CAPSULE ORAL
Status: DISCONTINUED | OUTPATIENT
Start: 2024-01-24 | End: 2024-02-06 | Stop reason: HOSPADM

## 2024-01-24 RX ORDER — MORPHINE SULFATE 4 MG/ML
4 INJECTION, SOLUTION INTRAMUSCULAR; INTRAVENOUS EVERY 4 HOURS PRN
Status: DISCONTINUED | OUTPATIENT
Start: 2024-01-24 | End: 2024-02-06 | Stop reason: HOSPADM

## 2024-01-24 RX ORDER — METHOCARBAMOL 500 MG/1
750 TABLET, FILM COATED ORAL EVERY 8 HOURS PRN
Status: DISCONTINUED | OUTPATIENT
Start: 2024-01-24 | End: 2024-02-06 | Stop reason: HOSPADM

## 2024-01-24 RX ORDER — OXYCODONE HYDROCHLORIDE 5 MG/1
5 TABLET ORAL ONCE
Status: COMPLETED | OUTPATIENT
Start: 2024-01-24 | End: 2024-01-24

## 2024-01-24 RX ORDER — LOSARTAN POTASSIUM 50 MG/1
100 TABLET ORAL EVERY MORNING
Status: DISCONTINUED | OUTPATIENT
Start: 2024-01-25 | End: 2024-01-24

## 2024-01-24 RX ORDER — MIRTAZAPINE 15 MG/1
45 TABLET, FILM COATED ORAL
Status: DISCONTINUED | OUTPATIENT
Start: 2024-01-24 | End: 2024-02-06 | Stop reason: HOSPADM

## 2024-01-24 RX ORDER — LIDOCAINE 50 MG/G
1 PATCH TOPICAL DAILY
Status: DISCONTINUED | OUTPATIENT
Start: 2024-01-24 | End: 2024-02-06 | Stop reason: HOSPADM

## 2024-01-24 RX ADMIN — MORPHINE SULFATE 4 MG: 4 INJECTION, SOLUTION INTRAMUSCULAR; INTRAVENOUS at 14:35

## 2024-01-24 RX ADMIN — OXYCODONE HYDROCHLORIDE 5 MG: 5 TABLET ORAL at 05:35

## 2024-01-24 RX ADMIN — HEPARIN SODIUM 5000 UNITS: 5000 INJECTION INTRAVENOUS; SUBCUTANEOUS at 16:48

## 2024-01-24 RX ADMIN — MORPHINE SULFATE 4 MG: 4 INJECTION, SOLUTION INTRAMUSCULAR; INTRAVENOUS at 09:15

## 2024-01-24 RX ADMIN — HYDROCHLOROTHIAZIDE 25 MG: 25 TABLET ORAL at 08:41

## 2024-01-24 RX ADMIN — IOHEXOL 100 ML: 350 INJECTION, SOLUTION INTRAVENOUS at 19:44

## 2024-01-24 RX ADMIN — PRAVASTATIN SODIUM 20 MG: 20 TABLET ORAL at 16:49

## 2024-01-24 RX ADMIN — OXYCODONE HYDROCHLORIDE 10 MG: 10 TABLET ORAL at 17:12

## 2024-01-24 RX ADMIN — MORPHINE SULFATE 4 MG: 4 INJECTION, SOLUTION INTRAMUSCULAR; INTRAVENOUS at 06:18

## 2024-01-24 RX ADMIN — CLONAZEPAM 1 MG: 1 TABLET ORAL at 17:13

## 2024-01-24 RX ADMIN — MIRTAZAPINE 45 MG: 15 TABLET, FILM COATED ORAL at 21:20

## 2024-01-24 RX ADMIN — GABAPENTIN 400 MG: 400 CAPSULE ORAL at 17:13

## 2024-01-24 RX ADMIN — Medication 3 MG: at 21:20

## 2024-01-24 RX ADMIN — LOSARTAN POTASSIUM 100 MG: 50 TABLET, FILM COATED ORAL at 08:41

## 2024-01-24 RX ADMIN — NIFEDIPINE 120 MG: 30 TABLET, EXTENDED RELEASE ORAL at 08:41

## 2024-01-24 RX ADMIN — ACETAMINOPHEN 325MG 975 MG: 325 TABLET ORAL at 02:27

## 2024-01-24 RX ADMIN — GADOBUTROL 8 ML: 604.72 INJECTION INTRAVENOUS at 10:24

## 2024-01-24 RX ADMIN — OXYCODONE HYDROCHLORIDE 10 MG: 10 TABLET ORAL at 21:20

## 2024-01-24 RX ADMIN — FINASTERIDE 5 MG: 5 TABLET, FILM COATED ORAL at 16:49

## 2024-01-24 RX ADMIN — CLONAZEPAM 1 MG: 0.5 TABLET ORAL at 06:44

## 2024-01-24 RX ADMIN — SERTRALINE HYDROCHLORIDE 100 MG: 100 TABLET ORAL at 08:41

## 2024-01-24 RX ADMIN — KETOROLAC TROMETHAMINE 15 MG: 30 INJECTION, SOLUTION INTRAMUSCULAR; INTRAVENOUS at 02:27

## 2024-01-24 RX ADMIN — ASPIRIN 81 MG: 81 TABLET, COATED ORAL at 16:49

## 2024-01-24 RX ADMIN — MORPHINE SULFATE 4 MG: 4 INJECTION, SOLUTION INTRAMUSCULAR; INTRAVENOUS at 18:35

## 2024-01-24 RX ADMIN — HYDROXYZINE HYDROCHLORIDE 25 MG: 25 TABLET, FILM COATED ORAL at 06:45

## 2024-01-24 RX ADMIN — FOLIC ACID 2000 MCG: 1 TABLET ORAL at 16:49

## 2024-01-24 RX ADMIN — LIDOCAINE 5% 1 PATCH: 700 PATCH TOPICAL at 16:48

## 2024-01-24 RX ADMIN — TAMSULOSIN HYDROCHLORIDE 0.4 MG: 0.4 CAPSULE ORAL at 16:49

## 2024-01-24 RX ADMIN — KETOROLAC TROMETHAMINE 15 MG: 30 INJECTION, SOLUTION INTRAMUSCULAR; INTRAVENOUS at 06:17

## 2024-01-24 RX ADMIN — OXYCODONE HYDROCHLORIDE 5 MG: 5 TABLET ORAL at 02:27

## 2024-01-24 RX ADMIN — METHOCARBAMOL TABLETS 500 MG: 500 TABLET, COATED ORAL at 05:35

## 2024-01-24 NOTE — PLAN OF CARE
Problem: PAIN - ADULT  Goal: Verbalizes/displays adequate comfort level or baseline comfort level  Description: Interventions:  - Encourage patient to monitor pain and request assistance  - Assess pain using appropriate pain scale  - Administer analgesics based on type and severity of pain and evaluate response  - Implement non-pharmacological measures as appropriate and evaluate response  - Consider cultural and social influences on pain and pain management  - Notify physician/advanced practitioner if interventions unsuccessful or patient reports new pain  Outcome: Progressing     Problem: INFECTION - ADULT  Goal: Absence or prevention of progression during hospitalization  Description: INTERVENTIONS:  - Assess and monitor for signs and symptoms of infection  - Monitor lab/diagnostic results  - Monitor all insertion sites, i.e. indwelling lines, tubes, and drains  - Monitor endotracheal if appropriate and nasal secretions for changes in amount and color  - [place] appropriate cooling/warming therapies per order  - Administer medications as ordered  - Instruct and encourage patient and family to use good hand hygiene technique  - Identify and instruct in appropriate isolation precautions for identified infection/condition  Outcome: Progressing  Goal: Absence of fever/infection during neutropenic period  Description: INTERVENTIONS:  - Monitor WBC    Outcome: Progressing     Problem: DISCHARGE PLANNING  Goal: Discharge to home or other facility with appropriate resources  Description: INTERVENTIONS:  - Identify barriers to discharge w/patient and caregiver  - Arrange for needed discharge resources and transportation as appropriate  - Identify discharge learning needs (meds, wound care, etc.)  - Arrange for interpretive services to assist at discharge as needed  - Refer to Case Management Department for coordinating discharge planning if the patient needs post-hospital services based on physician/advanced  practitioner order or complex needs related to functional status, cognitive ability, or social support system  Outcome: Progressing     Problem: Knowledge Deficit  Goal: Patient/family/caregiver demonstrates understanding of disease process, treatment plan, medications, and discharge instructions  Description: Complete learning assessment and assess knowledge base.  Interventions:  - Provide teaching at level of understanding  - Provide teaching via preferred learning methods  Outcome: Progressing     Problem: MUSCULOSKELETAL - ADULT  Goal: Maintain or return mobility to safest level of function  Description: INTERVENTIONS:  - Assess patient's ability to carry out ADLs; assess patient's baseline for ADL function and identify physical deficits which impact ability to perform ADLs (bathing, care of mouth/teeth, toileting, grooming, dressing, etc.)  - Assess/evaluate cause of self-care deficits   - Assess range of motion  - Assess patient's mobility  - Assess patient's need for assistive devices and provide as appropriate  - Encourage maximum independence but intervene and supervise when necessary  - Involve family in performance of ADLs  - Assess for home care needs following discharge   - Consider OT consult to assist with ADL evaluation and planning for discharge  - Provide patient education as appropriate  Outcome: Progressing  Goal: Maintain proper alignment of affected body part  Description: INTERVENTIONS:  - Support, maintain and protect limb and body alignment  - Provide patient/ family with appropriate education  Outcome: Progressing

## 2024-01-24 NOTE — ED PROVIDER NOTES
History  Chief Complaint   Patient presents with   • Back Pain     Pt reports lower back pain that radiates down his legs pt reports back surgery last April, pt followed up with burt recently was scheduled for cat scan Friday, pt reports medical [place] use, pt reports taking 6 ibuprofen and one robaxin at midnight as well as using cream for back pain     68-year-old male with history of alcohol abuse, diabetes, chronic back pain status post lumbar fusion with recent worsening symptoms currently undergoing evaluation for possible metastatic lesions to pelvis through neurosurgery.  Is supposed to get a CT pelvis with and without contrast later today however had worsening pain tonight so came in for evaluation, no new weakness, normally ambulates with a walker and did have 2-3 falls over the last week which has been happening to him since initial diagnosis of back pain.  No head strike, no loss of consciousness, no new sensory complaints on top of his normal neuropathy, no bowel or bladder incontinence or retention        Prior to Admission Medications   Prescriptions Last Dose Informant Patient Reported? Taking?   ACCU-CHEK FABIANO PLUS test strip  Self Yes No   Si (four) times a day   ACCU-CHEK FASTCLIX LANCETS MISC  Self Yes No   Si (four) times a day   CHOLECALCIFEROL PO  Self Yes No   Sig: Take 5,000 Units by mouth daily   GNP Aspirin Low Dose 81 MG EC tablet  Self Yes No   Sig: Take 1 tablet (81 mg total) by mouth daily Do not start before 2023.   Jardiance 10 MG TABS  Self Yes No   Sig: Take 10 mg by mouth every morning   METOPROLOL SUCCINATE ER PO  Self Yes No   Sig: Take 75 mg by mouth every morning   NIFEdipine (PROCARDIA XL) 30 mg 24 hr tablet  Self Yes No   Sig: Take 120 mg by mouth every morning Takes 90 mg and 30 mg =120 mg every AM   NIFEdipine (PROCARDIA XL) 90 mg 24 hr tablet  Self Yes No   Sig: Take 120 mg by mouth every morning Takes 90 mg and 30 mg =120 mg every AM    acetaminophen (TYLENOL) 500 mg tablet  Self No No   Sig: Take 1 tablet (500 mg total) by mouth every 6 (six) hours as needed for mild pain 500 mg tablet   clonazePAM (KlonoPIN) 1 mg tablet  Self Yes No   Sig: Take 1 mg by mouth 2 (two) times a day & 3rd pill PRN   finasteride (PROSCAR) 5 mg tablet  Self No No   Sig: Take 1 tablet (5 mg total) by mouth daily   fluticasone (FLONASE) 50 mcg/act nasal spray  Self Yes No   Si spray into each nostril 2 (two) times a day   folic acid (FOLVITE) 1 mg tablet  Self Yes No   Sig: Take 2,000 mcg by mouth daily   gabapentin (NEURONTIN) 400 mg capsule   No No   Sig: Take 1 capsule (400 mg total) by mouth daily at bedtime   ketoconazole (NIZORAL) 2 % shampoo  Self Yes No   Sig: Apply 1 application. topically daily Use as directed   lidocaine (LIDODERM) 5 %   No No   Sig: Apply 1 patch topically over 12 hours daily Remove & Discard patch within 12 hours or as directed by MD Do not start before 2023.   losartan-hydrochlorothiazide (HYZAAR) 100-25 MG per tablet  Self Yes No   Sig: Take 1 tablet by mouth every morning   lovastatin (MEVACOR) 20 mg tablet  Self Yes No   Sig: Take 20 mg by mouth every morning   melatonin 3 mg   No No   Sig: Take 1 tablet (3 mg total) by mouth daily at bedtime   metFORMIN (GLUCOPHAGE) 1000 MG tablet  Self Yes No   Sig: Take 1,000 mg by mouth 2 (two) times a day with meals    methocarbamol (ROBAXIN) 750 mg tablet   No No   Sig: Take 1 tablet (750 mg total) by mouth every 8 (eight) hours as needed for muscle spasms   mirtazapine (REMERON) 45 MG tablet  Self Yes No   Sig: Take 45 mg by mouth daily at bedtime   sertraline (ZOLOFT) 100 mg tablet  Self Yes No   Sig: Take 100 mg by mouth every morning   tamsulosin (FLOMAX) 0.4 mg  Self No No   Sig: Take 1 capsule (0.4 mg total) by mouth daily with dinner      Facility-Administered Medications: None       Past Medical History:   Diagnosis Date   • Anxiety    • Arthritis    • Cancer (HCC)    •  Depression    • Diabetes mellitus (HCC)    • Hypertension    • Liver disease    • Mitral valve prolapse    • Peripheral neuropathy     Neuropathy   • PONV (postoperative nausea and vomiting)    • Thyroid disease        Past Surgical History:   Procedure Laterality Date   • COLONOSCOPY     • INCISION AND DRAINAGE OF WOUND Left 2022    Procedure: INCISION AND DRAINAGE (I&D) EXTREMITY;  Surgeon: Moustapha Cote DPM;  Location:  MAIN OR;  Service: Podiatry   • JOINT REPLACEMENT Left 2022    Left TSA   • GA ARTHRODESIS POSTERIOR/PSTLAT TQ 1NTRSPC LUMBAR Bilateral 2023    Procedure: L1-S1 navigated posterior decompression with instrumented fixation fusion;  Surgeon: James Moraes MD;  Location:  MAIN OR;  Service: Neurosurgery   • THYROID SURGERY      remove cancer       Family History   Problem Relation Age of Onset   • No Known Problems Father    • No Known Problems Mother    • Colon cancer Neg Hx      I have reviewed and agree with the history as documented.    E-Cigarette/Vaping   • E-Cigarette Use Current Some Day User    • Comments medical marijuana - occ      E-Cigarette/Vaping Substances   • Nicotine No    • THC No    • CBD No    • Flavoring No    • Other No    • Unknown No      Social History     Tobacco Use   • Smoking status: Former     Current packs/day: 0.00     Average packs/day: 0.3 packs/day for 5.9 years (1.5 ttl pk-yrs)     Types: Cigarettes     Start date: 1975     Quit date: 1981     Years since quittin.6   • Smokeless tobacco: Never   • Tobacco comments:     quit    Vaping Use   • Vaping status: Some Days   Substance Use Topics   • Alcohol use: Yes     Alcohol/week: 4.0 - 7.0 standard drinks of alcohol     Types: 1 - 2 Glasses of wine, 2 - 3 Cans of beer, 1 - 2 Shots of liquor per week     Comment: only on special occasions   • Drug use: Yes     Frequency: 7.0 times per week     Types: Marijuana     Comment: Medical Marijuana       Review of Systems    Constitutional:  Negative for appetite change, chills and fever.   HENT:  Negative for rhinorrhea and sore throat.    Eyes:  Negative for photophobia and visual disturbance.   Respiratory:  Negative for cough and shortness of breath.    Cardiovascular:  Negative for chest pain and palpitations.   Gastrointestinal:  Negative for abdominal pain and diarrhea.   Genitourinary:  Negative for dysuria, frequency and urgency.   Musculoskeletal:  Positive for back pain.   Skin:  Negative for rash.   Neurological:  Negative for dizziness and weakness.   All other systems reviewed and are negative.      Physical Exam  Physical Exam  Vitals and nursing note reviewed.   Constitutional:       Appearance: He is well-developed.   HENT:      Head: Normocephalic and atraumatic.      Right Ear: External ear normal.      Left Ear: External ear normal.   Eyes:      Conjunctiva/sclera: Conjunctivae normal.      Pupils: Pupils are equal, round, and reactive to light.   Neck:      Vascular: No JVD.      Trachea: No tracheal deviation.   Cardiovascular:      Rate and Rhythm: Normal rate and regular rhythm.      Heart sounds: Normal heart sounds. No murmur heard.     No friction rub. No gallop.   Pulmonary:      Effort: Pulmonary effort is normal. No respiratory distress.      Breath sounds: No stridor. No wheezing or rales.   Abdominal:      General: There is no distension.      Palpations: Abdomen is soft. There is no mass.      Tenderness: There is no abdominal tenderness. There is no guarding or rebound.   Musculoskeletal:         General: Normal range of motion.      Cervical back: Normal range of motion and neck supple.      Comments: Muscle strength 4 out of 5 bilateral lower extremities, no saddle anesthesia    Midline lumbar pain, no step-offs, well-healed midline scar to the lumbar spine from previous fusion   Skin:     General: Skin is warm and dry.      Coloration: Skin is not pale.      Findings: No erythema or rash.    Neurological:      Mental Status: He is alert and oriented to person, place, and time.      Cranial Nerves: No cranial nerve deficit.         Vital Signs  ED Triage Vitals   Temperature Pulse Respirations Blood Pressure SpO2   01/24/24 0209 01/24/24 0209 01/24/24 0209 01/24/24 0209 01/24/24 0209   98.5 °F (36.9 °C) 86 18 (!) 178/77 98 %      Temp Source Heart Rate Source Patient Position - Orthostatic VS BP Location FiO2 (%)   01/24/24 0209 01/24/24 0209 01/24/24 0209 01/24/24 0209 --   Temporal Monitor Lying Right arm       Pain Score       01/24/24 0227       10 - Worst Possible Pain           Vitals:    01/24/24 0209   BP: (!) 178/77   Pulse: 86   Patient Position - Orthostatic VS: Lying         Visual Acuity      ED Medications  Medications   acetaminophen (TYLENOL) tablet 975 mg (975 mg Oral Given 1/24/24 0227)   ketorolac (TORADOL) injection 15 mg (15 mg Intramuscular Given 1/24/24 0227)   oxyCODONE (ROXICODONE) IR tablet 5 mg (5 mg Oral Given 1/24/24 0227)       Diagnostic Studies  Results Reviewed       None                   CT pelvis wo contrast    (Results Pending)   CT spine lumbar without contrast    (Results Pending)              Procedures  Procedures         ED Course  ED Course as of 01/24/24 2109 Wed Jan 24, 2024   0159 Medical notes reviewed most recent visit to [facility] [place] [facility] 1/11/2024 patient with chronic low back pain difficulty ambulating does have history of spinal fusion, as well as visit with neurosurgery on 1/11/2024, at that point CT pelvis was ordered to evaluate for possible malignancy due to lesions found on patient's iliac bones not appear that he got the CAT scan   0448 discussed with radiology, Dr. Ruiz, possible concern for L5-S1 discitis, osteomyelitis, will order MRI for the morning as well as lab work, possible neurosurgical evaluation and admission depending on outcome of the study   0558 Patient will be signed out to AM physician, Dr. Marlow for  further disposition decision pending MRI evaluation   0558 Hemoglobin(!): 11.4  Known anemia, improved from previous   0616 C-REACTIVE PROTEIN(!): 31.9  Improved from previous                               SBIRT 22yo+      Flowsheet Row Most Recent Value   Initial Alcohol Screen: US AUDIT-C     1. How often do you have a drink containing alcohol? 0 Filed at: 01/24/2024 0211   2. How many drinks containing alcohol do you have on a typical day you are drinking?  0 Filed at: 01/24/2024 0211   3a. Male UNDER 65: How often do you have five or more drinks on one occasion? 0 Filed at: 01/24/2024 0211   3b. FEMALE Any Age, or MALE 65+: How often do you have 4 or more drinks on one occassion? 0 Filed at: 01/24/2024 0211   Audit-C Score 0 Filed at: 01/24/2024 0211   JARON: How many times in the past year have you...    Used an illegal drug or used a prescription medication for non-medical reasons? Weekly Filed at: 01/24/2024 0211                      Medical Decision Making  68-year-old male with low back pain in setting of possible metastatic disease to the pelvis, will obtain CT imaging to evaluate for pathologic fracture, low suspicion for cord compression given no red flag symptoms of compression pathology    Amount and/or Complexity of Data Reviewed  Radiology: ordered.    Risk  OTC drugs.  Prescription drug management.             Disposition  Final diagnoses:   None     ED Disposition       None          Follow-up Information    None         Patient's Medications   Discharge Prescriptions    No medications on file       No discharge procedures on file.    PDMP Review         Value Time User    PDMP Reviewed  Yes 4/22/2023  6:39 AM Katja Oshea PA-C            ED Provider  Electronically Signed by             Adelita Marina DO  01/24/24 1218

## 2024-01-24 NOTE — ASSESSMENT & PLAN NOTE
MRI consistent with discitis/osteomyelitis of L1-2, L4-5, L5-S1 regions.  Neurosurgery consulted  Per ER discussion with neurosurgery, obtain blood cultures and hold off on antibiotics pending results, may need to obtain tissue sample.  Would only initiate abx if patient becomes septic  Consider transfer if patient becomes unstable/develops red flag symptoms

## 2024-01-24 NOTE — ASSESSMENT & PLAN NOTE
"Lab Results   Component Value Date    HGBA1C 6.3 (H) 03/14/2023       No results for input(s): \"POCGLU\" in the last 72 hours.    Blood Sugar Average: Last 72 hrs:    Maintained on metformin and jardiance outpatient.   Hold while admitted, can resume on discharge  SSI with meals while admitted  "

## 2024-01-24 NOTE — ASSESSMENT & PLAN NOTE
Continued on 120 mg nifedipine every morning, Hyzaar, metoprolol  Pressures elevated in ED, patient has not received all home medications, resume home medications and monitor

## 2024-01-24 NOTE — H&P
"[facility]  H&P  Name: Juan San 68 y.o. male I MRN: [id number]  Unit/Bed#: [facility] I Date of Admission: 1/24/2024   Date of Service: 1/24/2024 I Hospital Day: 0      Assessment/Plan   * Discitis of lumbosacral region  Assessment & Plan  MRI consistent with discitis/osteomyelitis of L1-2, L4-5, L5-S1 regions.  Neurosurgery consulted  Per ER discussion with neurosurgery, obtain blood cultures and hold off on antibiotics pending results, may need to obtain tissue sample.  Would only initiate abx if patient becomes septic  Consider transfer if patient becomes unstable/develops red flag symptoms     Herniated nucleus pulposus of lumbosacral region  Assessment & Plan  S/P surgical intervention April 2023    Hypertension  Assessment & Plan  Continued on 120 mg nifedipine every morning, Hyzaar, metoprolol  Pressures elevated in ED, patient has not received all home medications, resume home medications and monitor    Benign prostatic hyperplasia with urinary retention  Assessment & Plan  Continue finasteride and tamsulosin  Urinary retention protocol    Thyroid cancer (HCC)  Assessment & Plan  S/P partial thyroidectomy  Not maintained on levothyroxine - levels have been stable  Follows with Horacio Jackson - in remission    DM (diabetes mellitus) (HCC)  Assessment & Plan  Lab Results   Component Value Date    HGBA1C 6.3 (H) 03/14/2023       No results for input(s): \"POCGLU\" in the last 72 hours.    Blood Sugar Average: Last 72 hrs:    Maintained on metformin and jardiance outpatient.   Hold while admitted, can resume on discharge  SSI with meals while admitted    Cirrhosis, alcoholic (HCC)  Assessment & Plan  Continue outpatient follow-up  Follows with PCP           VTE Pharmacologic Prophylaxis: VTE Score: 3 Moderate Risk (Score 3-4) - Pharmacological DVT Prophylaxis Ordered: heparin.  Code Status: Prior   Discussion with family: Updated  (wife) at bedside.    Anticipated Length of " Stay: Patient will be admitted on an inpatient basis with an anticipated length of stay of greater than 2 midnights secondary to fever, concern for ileitis/osteomyelitis.    Total Time Spent on Date of Encounter in care of patient: 65 mins. This time was spent on one or more of the following: performing physical exam; counseling and coordination of care; obtaining or reviewing history; documenting in the medical record; reviewing/ordering tests, medications or procedures; communicating with other healthcare professionals and discussing with patient's family/caregivers.    Chief Complaint: back pain    History of Present Illness:  Juan San is a 68 y.o. male with a PMH of alcohol abuse, cirrhosis, history of thyroid cancer diabetes, chronic back pain status post lumbar fusion who presents with symptoms/back pain.   Patient currently undergoing outpatient evaluation for possible metastatic lesions to pelvis via neurosurgery.  Patient was to have CT pelvis with and without contrast today however with worsening pain, he decided to come to the emergency department for further evaluation.  He denies new weakness, able to ambulate with a walker at baseline, reported 2-3 falls over the last week. This has been an ongoing issue since initial diagnosis of back pain.   MRI in ED consistent with acute to subacute osteomyelitis/discitis.  Obtaining blood cultures.  Neurosurgery aware of patient.  Discussed with infectious disease. Hold on antibiotics until blood cultures result or patient becomes septic. Blood cultures pending.     Review of Systems:  Review of Systems   Musculoskeletal:  Positive for back pain.   Neurological:  Positive for weakness.   All other systems reviewed and are negative.      Past Medical and Surgical History:   Past Medical History:   Diagnosis Date    Anxiety     Arthritis     Cancer (HCC)     Depression     Diabetes mellitus (HCC)     Hypertension     Liver disease     Mitral valve prolapse      Peripheral neuropathy     Neuropathy    PONV (postoperative nausea and vomiting)     Thyroid disease        Past Surgical History:   Procedure Laterality Date    COLONOSCOPY      INCISION AND DRAINAGE OF WOUND Left 08/12/2022    Procedure: INCISION AND DRAINAGE (I&D) EXTREMITY;  Surgeon: Moustapha Cote DPM;  Location:  MAIN OR;  Service: Podiatry    JOINT REPLACEMENT Left 03/11/2022    Left TSA    NM ARTHRODESIS POSTERIOR/PSTLAT TQ 1NTRSPC LUMBAR Bilateral 04/11/2023    Procedure: L1-S1 navigated posterior decompression with instrumented fixation fusion;  Surgeon: James Moraes MD;  Location:  MAIN OR;  Service: Neurosurgery    THYROID SURGERY  2021    remove cancer       Meds/Allergies:  Prior to Admission medications    Medication Sig Start Date End Date Taking? Authorizing Provider   acetaminophen (TYLENOL) 500 mg tablet Take 1 tablet (500 mg total) by mouth every 6 (six) hours as needed for mild pain 500 mg tablet 4/22/23  Yes Katja Oshea PA-C   CHOLECALCIFEROL PO Take 5,000 Units by mouth daily   Yes Historical Provider, MD   clonazePAM (KlonoPIN) 1 mg tablet Take 1 mg by mouth 2 (two) times a day & 3rd pill PRN 10/24/22  Yes Historical Provider, MD   finasteride (PROSCAR) 5 mg tablet Take 1 tablet (5 mg total) by mouth daily 7/18/23  Yes Benedicto Rice MD   fluticasone (FLONASE) 50 mcg/act nasal spray 1 spray into each nostril daily as needed   Yes Historical Provider, MD   folic acid (FOLVITE) 1 mg tablet Take 2,000 mcg by mouth daily 12/17/18  Yes Historical Provider, MD   gabapentin (NEURONTIN) 400 mg capsule Take 1 capsule (400 mg total) by mouth daily at bedtime 10/12/23 1/24/24 Yes Leah He MD   GNP Aspirin Low Dose 81 MG EC tablet Take 1 tablet (81 mg total) by mouth daily Do not start before April 25, 2023. 4/25/23  Yes Katja Oshea PA-C   Jardiance 10 MG TABS Take 10 mg by mouth every morning 7/20/22  Yes Historical Provider, MD   ketoconazole (NIZORAL) 2 % shampoo Apply 1  application. topically daily Use as directed 10/10/18  Yes Historical Provider, MD   lidocaine (LIDODERM) 5 % Apply 1 patch topically over 12 hours daily Remove & Discard patch within 12 hours or as directed by MD Do not start before October 12, 2023. 10/12/23  Yes Leah He MD   losartan-hydrochlorothiazide (HYZAAR) 100-25 MG per tablet Take 1 tablet by mouth every morning 12/17/18  Yes Historical Provider, MD   lovastatin (MEVACOR) 20 mg tablet Take 20 mg by mouth every morning 12/17/18  Yes Historical Provider, MD   melatonin 3 mg Take 1 tablet (3 mg total) by mouth daily at bedtime 10/11/23  Yes Leah He MD   metFORMIN (GLUCOPHAGE) 1000 MG tablet Take 1,000 mg by mouth 2 (two) times a day with meals  1/2/19  Yes Historical Provider, MD   methocarbamol (ROBAXIN) 750 mg tablet Take 1 tablet (750 mg total) by mouth every 8 (eight) hours as needed for muscle spasms 10/11/23  Yes Leah He MD   METOPROLOL SUCCINATE ER PO Take 75 mg by mouth every morning   Yes Sabra Magaña DO   mirtazapine (REMERON) 45 MG tablet Take 45 mg by mouth daily at bedtime 12/11/18  Yes Historical Provider, MD   NIFEdipine (PROCARDIA XL) 30 mg 24 hr tablet Take 120 mg by mouth every morning Takes 90 mg and 30 mg =120 mg every AM 7/10/22  Yes Historical Provider, MD   NIFEdipine (PROCARDIA XL) 90 mg 24 hr tablet Take 120 mg by mouth every morning Takes 90 mg and 30 mg =120 mg every AM 12/17/18  Yes Historical Provider, MD   sertraline (ZOLOFT) 100 mg tablet Take 100 mg by mouth every morning   Yes Historical Provider, MD   tamsulosin (FLOMAX) 0.4 mg Take 1 capsule (0.4 mg total) by mouth daily with dinner 7/18/23  Yes MD MONICA Payne FABIANO PLUS test strip 4 (four) times a day 12/17/18   Historical ProviderMD ANDERSON FASTCLIX LANCETS MISC 4 (four) times a day 12/22/18   Historical Provider, MD   ARIPiprazole (ABILIFY) 30 mg tablet Take 30 mg by mouth daily at bedtime  Patient  not taking: Reported on 7/29/2022 12/17/18 8/9/22  Historical Provider, MD   DIGOX 250 MCG tablet TAKE 1 TABLET BY MOUTH EVERY DAY BUT DO NOT TAKE ON SUNDAY OR WEDNESDAY  Patient not taking: Reported on 7/29/2022 12/17/18 8/9/22  Historical Provider, MD   glipiZIDE (GLUCOTROL XL) 10 mg 24 hr tablet Take 10 mg by mouth 2 (two) times a day. Indications: Type 2 Diabetes 9/8/22 9/8/22  Sabra Magaña,    HUMULIN N 100 UNIT/ML subcutaneous injection INJECT 40 UNITS IN THE MORNING AND 6 UNITS in THE IN THE EVENING AS DIRECTED  Patient not taking: Reported on 8/9/2022 12/27/18 8/9/22  Historical Provider, MD   ondansetron (ZOFRAN) 4 mg tablet Take 1 tablet (4 mg total) by mouth every 6 (six) hours as needed for nausea or vomiting  Patient not taking: Reported on 7/29/2022 4/29/22 8/9/22  Stefany Urbina DO   propranolol (INDERAL LA) 160 mg Take 160 mg by mouth daily 12/17/18 9/16/22  Historical Provider, MD     I have reviewed home medications with patient family member.    Allergies:   Allergies   Allergen Reactions    Abilify [Aripiprazole] Tremor     Shaking      Cephalexin Rash    Molds & Smuts Allergic Rhinitis    Pregabalin Tremor     Lyrica - shaking feeling       Social History:  Marital Status: /Civil Union   Occupation: non-contributory  Patient Pre-hospital Living Situation: Home  Patient Pre-hospital Level of Mobility: walks with walker - falls 2-3 times weakly  Patient Pre-hospital Diet Restrictions: diabetic  Substance Use History:   Social History     Substance and Sexual Activity   Alcohol Use Yes    Alcohol/week: 4.0 - 7.0 standard drinks of alcohol    Types: 1 - 2 Glasses of wine, 2 - 3 Cans of beer, 1 - 2 Shots of liquor per week    Comment: only on special occasions     Social History     Tobacco Use   Smoking Status Former    Current packs/day: 0.00    Average packs/day: 0.3 packs/day for 5.9 years (1.5 ttl pk-yrs)    Types: Cigarettes    Start date: 7/14/1975    Quit date: 6/1/1981     Years since quittin.6   Smokeless Tobacco Never   Tobacco Comments    quit      Social History     Substance and Sexual Activity   Drug Use Yes    Frequency: 7.0 times per week    Types: Marijuana    Comment: Medical Marijuana       Family History:  Family History   Problem Relation Age of Onset    No Known Problems Father     No Known Problems Mother     Colon cancer Neg Hx        Physical Exam:     Vitals:   Blood Pressure: (!) 186/94 (24 0900)  Pulse: 68 (24 1315)  Temperature: 98.5 °F (36.9 °C) (24 020)  Temp Source: Temporal (24 020)  Respirations: 16 (24 0900)  SpO2: 97 % (24 1315)    Physical Exam  Vitals and nursing note reviewed.   Constitutional:       General: He is not in acute distress.     Appearance: Normal appearance. He is well-developed.   HENT:      Head: Normocephalic and atraumatic.   Eyes:      General: No scleral icterus.     Conjunctiva/sclera: Conjunctivae normal.   Cardiovascular:      Rate and Rhythm: Normal rate and regular rhythm.      Heart sounds: No murmur heard.  Pulmonary:      Effort: Pulmonary effort is normal.      Breath sounds: No wheezing, rhonchi or rales.   Abdominal:      General: There is no distension.      Palpations: Abdomen is soft.   Skin:     General: Skin is warm and dry.   Neurological:      General: No focal deficit present.      Mental Status: He is alert.   Psychiatric:         Mood and Affect: Mood normal.        Additional Data:     Lab Results:  Results from last 7 days   Lab Units 24  0545   WBC Thousand/uL 10.09   HEMOGLOBIN g/dL 11.4*   HEMATOCRIT % 38.1   PLATELETS Thousands/uL 373   NEUTROS PCT % 71   LYMPHS PCT % 18   MONOS PCT % 6   EOS PCT % 3     Results from last 7 days   Lab Units 24  0545   SODIUM mmol/L 137   POTASSIUM mmol/L 3.7   CHLORIDE mmol/L 97   CO2 mmol/L 33*   BUN mg/dL 13   CREATININE mg/dL 0.75   ANION GAP mmol/L 7   CALCIUM mg/dL 9.2   ALBUMIN g/dL 3.9   TOTAL BILIRUBIN mg/dL  0.22   ALK PHOS U/L 71   ALT U/L 13   AST U/L 16   GLUCOSE RANDOM mg/dL 195*                       Lines/Drains:  Invasive Devices       Peripheral Intravenous Line  Duration             Peripheral IV 01/24/24 Distal;Right;Upper;Ventral (anterior) Arm <1 day                        Imaging: Reviewed radiology reports from this admission including: MRI spine  MRI lumbar spine w wo contrast   Final Result by Jin Bassett MD (01/24 3335)      1.  Redemonstrated postsurgical changes status post L1-L5 laminectomies with posterior instrumented fusion spanning T12-S1. Evidence of bilateral S1 pedicle screw loosening, better demonstrated in same-day CT.   2.  Multilevel acute or subacute discitis/osteomyelitis involving postsurgical level L5-S1 and to lesser degree levels L1-2 and L4-5. There is acute or subacute discitis at level L2-3 with minimal subjacent endplate osteomyelitis.   3.  Epidural phlegmonous change pronounced at level L5-S1 without abscess. There is moderate canal stenosis at this level.   4.  Mild ventral epidural phlegmon at level L1-2. Paravertebral inflammation/phlegmonous change in the infected levels without apparent/drainable abscess.   5.  Degenerative change without high-grade canal or foraminal stenosis as detailed.            The study was marked in EPIC for immediate notification.      Workstation performed: [id number]         CT pelvis wo contrast   Final Result by Anish Ruiz MD (01/24 9461)      No acute fracture.      Numerous nonspecific lucencies throughout the bilateral iliac bones possibly reflective of underlying marrow process although appearance is similar to previous studies dating back to 2020. Consider further work-up as clinically warranted.      Above findings discussed with Dr. Marina at 4:40 a.m. on 1/24/2024.      Workstation performed: [id number]         CT spine lumbar without contrast   Final Result by Anish Ruiz MD (01/24 9638)   Addendum (preliminary) 1 of 1 by Anish  MD Joseph (01/24 4621)   ADDENDUM:      Worsened vacuum disc phenomenon at L5-S1 with stable to minimally    progressed erosive endplate changes at this level compared to the prior    recent examinations, possibly degenerative in etiology and/or related to    screw loosening although infectious    etiology i.e. discitis osteomyelitis not excluded. Clinical correlation    advised. Consider further evaluation with MRI.      Above findings discussed with Dr. Marina at 4:40 a.m. on 1/24/2024.      Final      No acute fracture or traumatic malalignment. No significant change compared to prior recent examinations.      Similar chronic degenerative/postsurgical changes as above.      Similar suspected loosening at the bilateral S1 transpedicular screws.      Workstation performed: [id number]             EKG and Other Studies Reviewed on Admission:   EKG: No EKG obtained.    ** Please Note: This note has been constructed using a voice recognition system. **

## 2024-01-24 NOTE — CONSULTS
e-Consult (IPC)     Inpatient consult to Neurosurgery  Consult performed by: Rossi Olvera PA-C  Consult ordered by: Sveta Smith PA-C           Contacted by Moustapha Marlow @ [facility].    Juan San 68 y.o. male MRN: [id number]  Unit/Bed#: ED 04 Encounter: [id number]    Reason for Consult    Per provider report, patient with fever and worsening chronic back pain / falls. Patient is known to the neurosurgery office status post T12-S1 posterior instrumented fixation fusion, L1-2 and L4-5 bilateral laminectomies and total facetectomies, L2-3 and L3-4 bilateral laminectomies and medial facetectomies, L5-S1 bilateral laminal foraminotomies on 4/11/2023 by Dr. Moraes in the setting of neurogenic claudication and radiculopathy. He was last seen on 1/10/24. He does have known bilateral S1 screw lucency with worsening weakness. Now he presents with fever. Available past medical history,social history, surgical history, medication list, drug allergies and review of systems were reviewed.    BP (!) 186/94   Pulse 68   Temp 98.5 °F (36.9 °C) (Temporal)   Resp 16   SpO2 97%      Clinical exam per provider report, .    Imaging personally reviewed.   MRI lumbar spine w/wo, 1/24/24: Epidural phlegmonous change pronounced at level L5-S1 without abscess. There is moderate canal stenosis at this level. Mild ventral epidural phlegmon at level L1-2. Paravertebral inflammation/phlegmonous change in the infected levels without apparent/drainable abscess    Assessment and Recommendations    1. Continue to monitor exam at [facility]  2. ID consulted, appreciate recommendations   CRP 31.9   ESR 41   WBC 10.09   Reportedly febrile but no temp documented   BC x2 drawne and pending   Recommend monitoring off antibiotics if possible / clinically stable  3. If BC negative, may need IR biopsy. Given lack of red flag signs would treat conservatively. No surgery / washout at this time.   4. OK to proceed with infectious work up at [facility].  No transfer at this time but please reach out with any new or concerning findings. We will monitor peripherally.     All questions answered. Provider is in agreement with the course of action.  21-30 minutes, >50% of the total time devoted to medical consultative verbal/EMR discussion between providers. Written report will be generated in the EMR.

## 2024-01-25 ENCOUNTER — APPOINTMENT (INPATIENT)
Dept: RADIOLOGY | Facility: HOSPITAL | Age: 69
DRG: 552 | End: 2024-01-25
Payer: COMMERCIAL

## 2024-01-25 ENCOUNTER — APPOINTMENT (OUTPATIENT)
Dept: PHYSICAL THERAPY | Facility: CLINIC | Age: 69
End: 2024-01-25
Payer: COMMERCIAL

## 2024-01-25 LAB
ANION GAP SERPL CALCULATED.3IONS-SCNC: 11 MMOL/L
BASOPHILS # BLD AUTO: 0.08 THOUSANDS/ÂΜL (ref 0–0.1)
BASOPHILS NFR BLD AUTO: 1 % (ref 0–1)
BUN SERPL-MCNC: 11 MG/DL (ref 5–25)
CALCIUM SERPL-MCNC: 9.6 MG/DL (ref 8.4–10.2)
CHLORIDE SERPL-SCNC: 93 MMOL/L (ref 96–108)
CO2 SERPL-SCNC: 30 MMOL/L (ref 21–32)
CREAT SERPL-MCNC: 0.74 MG/DL (ref 0.6–1.3)
EOSINOPHIL # BLD AUTO: 0.28 THOUSAND/ÂΜL (ref 0–0.61)
EOSINOPHIL NFR BLD AUTO: 2 % (ref 0–6)
ERYTHROCYTE [DISTWIDTH] IN BLOOD BY AUTOMATED COUNT: 17.9 % (ref 11.6–15.1)
GFR SERPL CREATININE-BSD FRML MDRD: 96 ML/MIN/1.73SQ M
GLUCOSE SERPL-MCNC: 138 MG/DL (ref 65–140)
GLUCOSE SERPL-MCNC: 142 MG/DL (ref 65–140)
GLUCOSE SERPL-MCNC: 155 MG/DL (ref 65–140)
GLUCOSE SERPL-MCNC: 196 MG/DL (ref 65–140)
GLUCOSE SERPL-MCNC: 296 MG/DL (ref 65–140)
HCT VFR BLD AUTO: 40.6 % (ref 36.5–49.3)
HGB BLD-MCNC: 12.4 G/DL (ref 12–17)
IMM GRANULOCYTES # BLD AUTO: 0.05 THOUSAND/UL (ref 0–0.2)
IMM GRANULOCYTES NFR BLD AUTO: 0 % (ref 0–2)
LYMPHOCYTES # BLD AUTO: 2.23 THOUSANDS/ÂΜL (ref 0.6–4.47)
LYMPHOCYTES NFR BLD AUTO: 17 % (ref 14–44)
MCH RBC QN AUTO: 24.9 PG (ref 26.8–34.3)
MCHC RBC AUTO-ENTMCNC: 30.5 G/DL (ref 31.4–37.4)
MCV RBC AUTO: 82 FL (ref 82–98)
MONOCYTES # BLD AUTO: 0.95 THOUSAND/ÂΜL (ref 0.17–1.22)
MONOCYTES NFR BLD AUTO: 7 % (ref 4–12)
NEUTROPHILS # BLD AUTO: 9.41 THOUSANDS/ÂΜL (ref 1.85–7.62)
NEUTS SEG NFR BLD AUTO: 73 % (ref 43–75)
NRBC BLD AUTO-RTO: 0 /100 WBCS
PLATELET # BLD AUTO: 397 THOUSANDS/UL (ref 149–390)
PMV BLD AUTO: 10.1 FL (ref 8.9–12.7)
POTASSIUM SERPL-SCNC: 4.2 MMOL/L (ref 3.5–5.3)
RBC # BLD AUTO: 4.98 MILLION/UL (ref 3.88–5.62)
SODIUM SERPL-SCNC: 134 MMOL/L (ref 135–147)
WBC # BLD AUTO: 13 THOUSAND/UL (ref 4.31–10.16)

## 2024-01-25 PROCEDURE — 80048 BASIC METABOLIC PNL TOTAL CA: CPT | Performed by: PHYSICIAN ASSISTANT

## 2024-01-25 PROCEDURE — 85025 COMPLETE CBC W/AUTO DIFF WBC: CPT | Performed by: PHYSICIAN ASSISTANT

## 2024-01-25 PROCEDURE — 99232 SBSQ HOSP IP/OBS MODERATE 35: CPT | Performed by: PHYSICIAN ASSISTANT

## 2024-01-25 PROCEDURE — 0S923ZX DRAINAGE OF LUMBAR VERTEBRAL DISC, PERCUTANEOUS APPROACH, DIAGNOSTIC: ICD-10-PCS | Performed by: RADIOLOGY

## 2024-01-25 PROCEDURE — 82948 REAGENT STRIP/BLOOD GLUCOSE: CPT

## 2024-01-25 PROCEDURE — 72100 X-RAY EXAM L-S SPINE 2/3 VWS: CPT

## 2024-01-25 RX ORDER — SODIUM CHLORIDE 9 MG/ML
75 INJECTION, SOLUTION INTRAVENOUS CONTINUOUS
Status: CANCELLED | OUTPATIENT
Start: 2024-01-25

## 2024-01-25 RX ADMIN — TAMSULOSIN HYDROCHLORIDE 0.4 MG: 0.4 CAPSULE ORAL at 16:16

## 2024-01-25 RX ADMIN — CLONAZEPAM 1 MG: 1 TABLET ORAL at 16:16

## 2024-01-25 RX ADMIN — PRAVASTATIN SODIUM 20 MG: 20 TABLET ORAL at 16:16

## 2024-01-25 RX ADMIN — MORPHINE SULFATE 4 MG: 4 INJECTION, SOLUTION INTRAMUSCULAR; INTRAVENOUS at 20:29

## 2024-01-25 RX ADMIN — MORPHINE SULFATE 4 MG: 4 INJECTION, SOLUTION INTRAMUSCULAR; INTRAVENOUS at 05:40

## 2024-01-25 RX ADMIN — MIRTAZAPINE 45 MG: 15 TABLET, FILM COATED ORAL at 22:02

## 2024-01-25 RX ADMIN — GABAPENTIN 400 MG: 400 CAPSULE ORAL at 10:04

## 2024-01-25 RX ADMIN — INSULIN LISPRO 2 UNITS: 100 INJECTION, SOLUTION INTRAVENOUS; SUBCUTANEOUS at 16:33

## 2024-01-25 RX ADMIN — OXYCODONE HYDROCHLORIDE 10 MG: 10 TABLET ORAL at 17:42

## 2024-01-25 RX ADMIN — FOLIC ACID 2000 MCG: 1 TABLET ORAL at 10:03

## 2024-01-25 RX ADMIN — INSULIN LISPRO 1 UNITS: 100 INJECTION, SOLUTION INTRAVENOUS; SUBCUTANEOUS at 10:22

## 2024-01-25 RX ADMIN — LIDOCAINE 5% 1 PATCH: 700 PATCH TOPICAL at 10:04

## 2024-01-25 RX ADMIN — METOPROLOL SUCCINATE 75 MG: 50 TABLET, EXTENDED RELEASE ORAL at 10:03

## 2024-01-25 RX ADMIN — NIFEDIPINE 120 MG: 30 TABLET, EXTENDED RELEASE ORAL at 10:02

## 2024-01-25 RX ADMIN — HYDROCHLOROTHIAZIDE 25 MG: 25 TABLET ORAL at 10:04

## 2024-01-25 RX ADMIN — GABAPENTIN 400 MG: 400 CAPSULE ORAL at 17:35

## 2024-01-25 RX ADMIN — Medication 3 MG: at 22:02

## 2024-01-25 RX ADMIN — LOSARTAN POTASSIUM 100 MG: 50 TABLET, FILM COATED ORAL at 10:02

## 2024-01-25 RX ADMIN — HEPARIN SODIUM 5000 UNITS: 5000 INJECTION INTRAVENOUS; SUBCUTANEOUS at 16:16

## 2024-01-25 RX ADMIN — SERTRALINE HYDROCHLORIDE 100 MG: 100 TABLET ORAL at 10:04

## 2024-01-25 RX ADMIN — CLONAZEPAM 1 MG: 1 TABLET ORAL at 05:52

## 2024-01-25 RX ADMIN — FINASTERIDE 5 MG: 5 TABLET, FILM COATED ORAL at 10:03

## 2024-01-25 RX ADMIN — ASPIRIN 81 MG: 81 TABLET, COATED ORAL at 10:04

## 2024-01-25 RX ADMIN — HEPARIN SODIUM 5000 UNITS: 5000 INJECTION INTRAVENOUS; SUBCUTANEOUS at 22:02

## 2024-01-25 RX ADMIN — OXYCODONE HYDROCHLORIDE 10 MG: 10 TABLET ORAL at 02:48

## 2024-01-25 RX ADMIN — INSULIN LISPRO 4 UNITS: 100 INJECTION, SOLUTION INTRAVENOUS; SUBCUTANEOUS at 12:34

## 2024-01-25 NOTE — CASE MANAGEMENT
Case Management Assessment & Discharge Planning Note    Patient name Juan San  Location /-01 MRN [id number]  : 1955 Date 2024       Current Admission Date: 2024  Current Admission Diagnosis:Discitis of lumbosacral region   Patient Active Problem List    Diagnosis Date Noted    Discitis of lumbosacral region 2024    Hypochromic microcytic anemia 10/10/2023    Fall 10/09/2023    Chronic bilateral low back pain with sciatica 10/09/2023    Anxiety and depression 10/09/2023    Hypertension 10/09/2023    Benign prostatic hyperplasia with urinary retention 2023    Scrotal pain 2023    Lower urinary tract symptoms 2023    Status post lumbar spinal fusion 2023    Hyponatremia 2023    Gallstones 2023    Anemia 2023    Urinary retention 2023    PONV (postoperative nausea and vomiting)     Medical marijuana use     Chronic bilateral low back pain without sciatica 2023    Chronic pain syndrome 2022    Liver disease 08/10/2022    Bronchitis, mucopurulent recurrent (HCC) 2022    Cirrhosis, alcoholic (HCC) 2022    Diabetic peripheral neuropathy (HCC) 2022    Herniated nucleus pulposus of lumbosacral region 2022    Thyroid cancer (HCC) 2021    Alcoholism in remission (HCC) 2021    Benzodiazepine dependence (HCC) 2021    Bilateral adhesive capsulitis of shoulders 2021    DM (diabetes mellitus) (HCC) 2021    Hypercholesterolemia 2021    Lumbar spondylosis 2021      LOS (days): 1  Geometric Mean LOS (GMLOS) (days): 2.9  Days to GMLOS:1.8     OBJECTIVE:    Risk of Unplanned Readmission Score: 33.22         Current admission status: Inpatient       Preferred Pharmacy:   [facility] [address] [address]  Phone: [phone] Fax: [phone]    Primary Care Provider: Sabra Magaña  DO    Primary Insurance: [employer]  REP  Secondary Insurance: [facility] [place]    ASSESSMENT:  Active Health Care Proxies       Tatyana San Health Care Representative - Spouse   Primary Phone: [phone] (Mobile)                 Advance Directives  Does patient have a Health Care POA?: No  Was patient offered paperwork?: Yes (declined)  Does patient currently have a Health Care decision maker?: Yes, please see Health Care Proxy section  Does patient have Advance Directives?: No  Was patient offered paperwork?: Yes (declined)  Primary Contact: Tatyana San         Readmission Root Cause  30 Day Readmission: No    Patient Information  Admitted from:: Home  Mental Status: Alert  During Assessment patient was accompanied by: Not accompanied during assessment  Assessment information provided by:: Spouse  Primary Caregiver: Self  Support Systems: Spouse/significant other, Family members, Self  County of Residence: [place]  What city do you live in?: [place]  Home entry access options. Select all that apply.: Stairs  Number of steps to enter home.:  (19)  Do the steps have railings?: Yes  Type of Current Residence: Apartment  Floor Level: 1  Upon entering residence, is there a bedroom on the main floor (no further steps)?: Yes  Upon entering residence, is there a bathroom on the main floor (no further steps)?: Yes  Living Arrangements: Lives w/ Spouse/significant other  Is patient a ?: Yes  Is patient active with [facility] ( Affairs)?: No    Activities of Daily Living Prior to Admission  Functional Status: Independent  Completes ADLs independently?: Yes  Ambulates independently?: Yes  Does patient use assisted devices?: Yes  Assisted Devices (DME) used: Walker, Straight Cane  Does patient currently own DME?: Yes  What DME does the patient currently own?: Walker, Straight Cane  Does patient have a history of Outpatient Therapy (PT/OT)?: Yes  Does the patient have a history of Short-Term  Rehab?: Yes  Does patient have a history of HHC?: Yes  Does patient currently have HHC?: No         Patient Information Continued  Income Source: Pension/alf  Does patient have prescription coverage?: Yes  Does patient receive dialysis treatments?: No  Does patient have a history of substance abuse?: No  Does patient have a history of Mental Health Diagnosis?: Yes  Has patient received inpatient treatment related to mental health in the last 2 years?: Yes         Means of Transportation  Means of Transport to App:: Family transport      Housing Stability: Low Risk  (1/25/2024)    Housing Stability Vital Sign     Unable to Pay for Housing in the Last Year: No     Number of Places Lived in the Last Year: 1     Unstable Housing in the Last Year: No   Food Insecurity: No Food Insecurity (1/25/2024)    Hunger Vital Sign     Worried About Running Out of Food in the Last Year: Never true     Ran Out of Food in the Last Year: Never true   Transportation Needs: No Transportation Needs (1/25/2024)    PRAPARE - Transportation     Lack of Transportation (Medical): No     Lack of Transportation (Non-Medical): No   Utilities: Not At Risk (1/25/2024)    [facility] Utilities     Threatened with loss of utilities: No       DISCHARGE DETAILS:    Discharge planning discussed with:: patient wife. Pt in Xray  Freedom of Choice: Yes  Comments - Freedom of Choice: disucssed dc planning and role of Cm  CM contacted family/caregiver?: Yes  Were Treatment Team discharge recommendations reviewed with patient/caregiver?: Yes  Did patient/caregiver verbalize understanding of patient care needs?: Yes       Contacts  Patient Contacts: Tatyana San, wife  Relationship to Patient:: Family  Contact Method: Phone  Phone Number: [phone]  Reason/Outcome: Continuity of Care, Emergency Contact, Discharge Planning    Requested Home Health Care         Is the patient interested in HHC at discharge?: No (pending PT ot recs)    DME Referral  Provided  Referral made for DME?: No    Other Referral/Resources/Interventions Provided:  Interventions: HHC, Short Term Rehab  Referral Comments: Pt/OT to see. Pt with recent falls            Discharge Destination Plan:: Home with Home Health Care, Short Term Rehab  Transport at Discharge : Family, Wheelchair van, BLS Ambulance                                      Additional Comments: Cm spoke with pts wife as pt was with Xray. Pts wife reports that she and pt reside in an apartment with 19 c[address]. Pt has a walker and cane at home. He has a hx of HHc with [facility]. Pts wife reports that [employer] are starting 54 hours a week and he is eligible for more hours if needed. Pt has been to [facility] before but would not want to return. Pt is current with OP PT/OT with [facility]. Pt is not currently driving his wife and other family help with transport when needed. Pts sees PCp through [facility]. neuro dayami consutled. Pt to have CT pelvis. He has a hx of back surgery from April. Cm following. Pt/OT to see

## 2024-01-25 NOTE — PROGRESS NOTES
[facility]  Progress Note  Name: Juan San I  MRN: [id number]  Unit/Bed#: -01 I Date of Admission: 1/24/2024   Date of Service: 1/25/2024 I Hospital Day: 1    Assessment/Plan   * Discitis of lumbosacral region  Assessment & Plan  MRI consistent with discitis/osteomyelitis of L1-2, L4-5, L5-S1 regions.  Neurosurgery consulted  Per ER discussion with neurosurgery, obtain blood cultures and hold off on antibiotics pending results unless patient clinically declines  WBC did increase to 13k but afebrile, hemodynamically stable and clinically appears well  Consider transfer if patient becomes unstable/develops red flag symptoms however exam currently stable  Discussed with ID - recommend IR consult for possible biopsy  Blood cultures pending x2    Hypertension  Assessment & Plan  Continued on 120 mg nifedipine every morning, Hyzaar, metoprolol  Elevated BP on presentation likely multifactorial in setting of pain, missed doses of home antihypertensives  Improved this AM    Benign prostatic hyperplasia with urinary retention  Assessment & Plan  Continue finasteride and tamsulosin  Urinary retention protocol    Thyroid cancer (HCC)  Assessment & Plan  S/P partial thyroidectomy  Not maintained on levothyroxine - levels have been stable  Follows with [facility] - in remission    Herniated nucleus pulposus of lumbosacral region  Assessment & Plan  S/P surgical intervention April 2023    DM (diabetes mellitus) (HCC)  Assessment & Plan  Lab Results   Component Value Date    HGBA1C 8.2 (H) 01/24/2024       Recent Labs     01/24/24  1552 01/24/24  2112 01/25/24  0750 01/25/24  1058   POCGLU 129 123 155* 296*       Blood Sugar Average: Last 72 hrs:  (P) 175.75  Maintained on metformin and jardiance outpatient.   Hold while admitted, can resume on discharge  SSI with meals while admitted    Cirrhosis, alcoholic (HCC)  Assessment & Plan  Continue outpatient follow-up  Follows with PCP          VTE Pharmacologic Prophylaxis: VTE Score: 3 Moderate Risk (Score 3-4) - Pharmacological DVT Prophylaxis Ordered: heparin.    Mobility:   Basic Mobility Inpatient Raw Score: 15  JH-HLM Goal: 4: Move to chair/commode  JH-HLM Achieved: 5: Stand (1 or more minutes)  HLM Goal achieved. Continue to encourage appropriate mobility.    Patient Centered Rounds: I performed bedside rounds with nursing staff today.   Discussions with Specialists or Other Care Team Provider: CM, IR, ID    Education and Discussions with Family / Patient: Attempted to update  (sister) via phone. Unable to contact.    Total Time Spent on Date of Encounter in care of patient: 40 mins. This time was spent on one or more of the following: performing physical exam; counseling and coordination of care; obtaining or reviewing history; documenting in the medical record; reviewing/ordering tests, medications or procedures; communicating with other healthcare professionals and discussing with patient's family/caregivers.    Current Length of Stay: 1 day(s)  Current Patient Status: Inpatient   Certification Statement: The patient will continue to require additional inpatient hospital stay due to blood cultures results, IR eval for bx, ID recs  Discharge Plan: Anticipate discharge in 48 hrs to discharge location to be determined pending rehab evaluations.    Code Status: Level 1 - Full Code    Subjective:   Patient overall feels well.  Back pain well-controlled.  Denies chest pain/palpitations, shortness of breath, nausea/vomiting, abdominal pain.  Does have chronic lower extremity neuropathy which is at baseline per patient.  Denies any lower extremity weakness, saddle anesthesia, bowel or bladder incontinence.    Objective:     Vitals:   Temp (24hrs), Av.6 °F (37 °C), Min:98.1 °F (36.7 °C), Max:98.8 °F (37.1 °C)    Temp:  [98.1 °F (36.7 °C)-98.8 °F (37.1 °C)] 98.1 °F (36.7 °C)  HR:  [88-99] 99  Resp:  [16-18] 16  BP: (143-172)/(81-86)  143/85  SpO2:  [96 %-98 %] 98 %  Body mass index is 26.58 kg/m².     Input and Output Summary (last 24 hours):     Intake/Output Summary (Last 24 hours) at 1/25/2024 1346  Last data filed at 1/25/2024 0835  Gross per 24 hour   Intake 540 ml   Output 1350 ml   Net -810 ml       Physical Exam:   Physical Exam  Vitals and nursing note reviewed.   Constitutional:       General: He is not in acute distress.     Appearance: He is well-developed.      Comments: No acute distress   HENT:      Head: Normocephalic and atraumatic.   Eyes:      General: No scleral icterus.     Extraocular Movements: Extraocular movements intact.      Conjunctiva/sclera: Conjunctivae normal.   Cardiovascular:      Rate and Rhythm: Normal rate and regular rhythm.      Heart sounds: No murmur heard.  Pulmonary:      Effort: Pulmonary effort is normal. No respiratory distress.      Breath sounds: Normal breath sounds. No wheezing, rhonchi or rales.   Abdominal:      General: Bowel sounds are normal.      Palpations: Abdomen is soft.      Tenderness: There is no abdominal tenderness. There is no guarding or rebound.   Musculoskeletal:         General: No swelling.      Cervical back: Normal range of motion.      Comments: Able to move upper/lower extremities bilaterally, no edema   Skin:     General: Skin is warm and dry.      Capillary Refill: Capillary refill takes less than 2 seconds.   Neurological:      General: No focal deficit present.      Mental Status: He is alert and oriented to person, place, and time.      Comments: Strength 5/5 throughout of all extremities   Psychiatric:         Mood and Affect: Mood normal.         Speech: Speech normal.         Behavior: Behavior normal.          Additional Data:     Labs:  Results from last 7 days   Lab Units 01/25/24  0520   WBC Thousand/uL 13.00*   HEMOGLOBIN g/dL 12.4   HEMATOCRIT % 40.6   PLATELETS Thousands/uL 397*   NEUTROS PCT % 73   LYMPHS PCT % 17   MONOS PCT % 7   EOS PCT % 2     Results  from last 7 days   Lab Units 01/25/24  0520 01/24/24  0545   SODIUM mmol/L 134* 137   POTASSIUM mmol/L 4.2 3.7   CHLORIDE mmol/L 93* 97   CO2 mmol/L 30 33*   BUN mg/dL 11 13   CREATININE mg/dL 0.74 0.75   ANION GAP mmol/L 11 7   CALCIUM mg/dL 9.6 9.2   ALBUMIN g/dL  --  3.9   TOTAL BILIRUBIN mg/dL  --  0.22   ALK PHOS U/L  --  71   ALT U/L  --  13   AST U/L  --  16   GLUCOSE RANDOM mg/dL 138 195*         Results from last 7 days   Lab Units 01/25/24  1058 01/25/24  0750 01/24/24  2112 01/24/24  1552   POC GLUCOSE mg/dl 296* 155* 123 129     Results from last 7 days   Lab Units 01/24/24  1519   HEMOGLOBIN A1C % 8.2*     Results from last 7 days   Lab Units 01/24/24  1516   LACTIC ACID mmol/L 1.6       Lines/Drains:  Invasive Devices       Peripheral Intravenous Line  Duration             Peripheral IV 01/24/24 Distal;Right;Upper;Ventral (anterior) Arm 1 day                          Imaging: Reviewed radiology reports from this admission including: MRI spine    Recent Cultures (last 7 days):   Results from last 7 days   Lab Units 01/24/24  1516   BLOOD CULTURE  Received in Microbiology Lab. Culture in Progress.  Received in Microbiology Lab. Culture in Progress.       Last 24 Hours Medication List:   Current Facility-Administered Medications   Medication Dose Route Frequency Provider Last Rate    acetaminophen  650 mg Oral Q6H PRN Sveta Smith PA-C      aluminum-magnesium hydroxide-simethicone  30 mL Oral Q6H PRN Sveta Smith PA-C      aspirin  81 mg Oral Daily Sveta Smith PA-C      clonazePAM  1 mg Oral BID PRN Leatha Shavonne DO Silvio      finasteride  5 mg Oral Daily Sveta Smith PA-C      fluticasone  1 spray Nasal Daily PRN Sveta Smith PA-C      folic acid  2,000 mcg Oral Daily Sveta Smith PA-C      gabapentin  400 mg Oral BID Sveta Smith PA-C      heparin (porcine)  5,000 Units Subcutaneous Q8H CHRISTIE Sveta Smith PA-C      losartan  100 mg Oral Daily Adelita Marina DO      And    hydrochlorothiazide  25 mg Oral Daily  Adelita Marina, DO      insulin lispro  1-6 Units Subcutaneous TID AC Sveta Smith, DANIEL      insulin lispro  1-6 Units Subcutaneous HS Sveta Smith, DANIEL      lidocaine  1 patch Topical Daily Sveta Smith, DANIEL      melatonin  3 mg Oral HS Sveta Smith, DANIEL      methocarbamol  750 mg Oral Q8H PRN Sveta Smith, DANIEL      metoprolol succinate  75 mg Oral QAM Sveta Smith, DANIEL      mirtazapine  45 mg Oral HS Sveta Smith, DANIEL      morphine injection  4 mg Intravenous Q4H PRN Sveta Smith, DANIEL      NIFEdipine  120 mg Oral Daily Adelita Marina,       ondansetron  4 mg Intravenous Q6H PRN Sveta Smith, DANIEL      oxyCODONE  10 mg Oral Q4H PRN Sveta Smith, DANIEL      oxyCODONE  5 mg Oral Q4H PRN Sveta Smith, DANIEL      pravastatin  20 mg Oral Daily With Dinner Sveta Smith, DANIEL      sertraline  100 mg Oral Daily Adelita Marina,       tamsulosin  0.4 mg Oral Daily With Dinner Sveta Smith, DANIEL          Today, Patient Was Seen By: Sintia Soto PA-C    **Please Note: This note may have been constructed using a voice recognition system.**

## 2024-01-25 NOTE — ASSESSMENT & PLAN NOTE
Continued on 120 mg nifedipine every morning, Hyzaar, metoprolol  Elevated BP on presentation likely multifactorial in setting of pain, missed doses of home antihypertensives  Improved this AM

## 2024-01-25 NOTE — ASSESSMENT & PLAN NOTE
S/P partial thyroidectomy  Not maintained on levothyroxine - levels have been stable  Follows with [place] - in remission

## 2024-01-25 NOTE — UTILIZATION REVIEW
Initial Clinical Review  Date: 1/26/24    Day 3: Has surpassed a 2nd midnight with active treatments and services, which include med surg level of care:  lumbar discitis/osteomyelitis:   ongoing labs, pain control.  IR for biopsy, must hold asa for 5 days, ID consult, pain control.     Admission: Date/Time/Statement:   Admission Orders (From admission, onward)       Ordered        01/24/24 1433  INPATIENT ADMISSION  Once                          Orders Placed This Encounter   Procedures    INPATIENT ADMISSION     Standing Status:   Standing     Number of Occurrences:   1     Order Specific Question:   Level of Care     Answer:   Med Surg [16]     Order Specific Question:   Estimated length of stay     Answer:   More than 2 Midnights     Order Specific Question:   Certification     Answer:   I certify that inpatient services are medically necessary for this patient for a duration of greater than two midnights. See H&P and MD Progress Notes for additional information about the patient's course of treatment.     ED Arrival Information       Expected   -    Arrival   1/24/2024 01:57    Acuity   Urgent              Means of arrival   Wheelchair    Escorted by   Family Member    Service   Hospitalist    Admission type   Emergency              Arrival complaint   Servere pain in back from waist to toes             Chief Complaint   Patient presents with    Back Pain     Pt reports lower back pain that radiates down his legs pt reports back surgery last April, pt followed up with [facility] recently was scheduled for cat scan Friday, pt reports medical [place] use, pt reports taking 6 ibuprofen and one robaxin at midnight as well as using cream for back pain       Initial Presentation: 68 y.o. male from home to ED admitted inpatient due to Multilevel acute or subacute discitis/osteomyelitis involving lumbosacral spine/   Epidural phlegmonous change at L5-S1 without abscess/acute on chronic back pain.  History of multiple  surgeries on back.    Presented due to worsening pain starting evening prior to arrival.  Since last week with 2 to 3 falls due to back pain.    On exam: Muscle strength 4 out of 5 bilateral lower extremities, no saddle anesthesia.   Midline lumbar pain, no step-offs, well-healed midline scar to the lumbar spine from previous fusion.  CRP 31.9.  MRI lumbar spine  consistent with discitis/osteomyelitis of L1-2, L4-5, L5-S1 regions   in  the ED given multiple doses of IV analgesia.  Plan: consult neuro surgery.  Monitor off antibiotics.   Optimize pain regimen.   Continue home antihypertensives.   Hold Jardiance and metformin.  Start SSI.     1/24/24 per neurosurgery - Patient with fever and worsened back pain.   status post T12-S1 posterior instrumented fixation fusion, L1-2 and L4-5 bilateral laminectomies and total facetectomies, L2-3 and L3-4 bilateral laminectomies and medial facetectomies, L5-S1 bilateral laminal foraminotomies on 4/11/2023. He does have known bilateral S1 screw lucency with worsening weakness.  Plan:  monitor neuro exam.  Consult ID.   Monitor off antibiotics if possible.    If BC negative, may need IR biopsy. Given lack of red flag signs would treat conservatively. No surgery / washout at this time     Date: 1/25/24    Day 2: Back pain is controlled.  LE neuropathy at baseline.   On exam: strength 5/5 all extremities.  Wbc 13. Discussed with ID - recommend IR consult for possible biopsy. Follow blood cultures. Continue pain control    Per ID 1/25/24:  patient with epidural phlegmon, osteomyelitis/discitis, leukocytosis, type 2 diabetes, relatively recent spinal surgeries. Plan is monitor off antibiotics and IR biopsy.   If develops fever or hypotension, start cefepime and vancomycin  Optimize glucose control    Per IR 1/25/24 per IR- consult for biopsy in patient with lumbar discitis/osteomyelitis.   Need to hold asa for 5 days, and plan is for 1/30/24    ED Triage Vitals   Temperature Pulse  Respirations Blood Pressure SpO2   01/24/24 0209 01/24/24 0209 01/24/24 0209 01/24/24 0209 01/24/24 0209   98.5 °F (36.9 °C) 86 18 (!) 178/77 98 %      Temp Source Heart Rate Source Patient Position - Orthostatic VS BP Location FiO2 (%)   01/24/24 0209 01/24/24 0209 01/24/24 0209 01/24/24 0209 --   Temporal Monitor Lying Right arm       Pain Score       01/24/24 0227       10 - Worst Possible Pain          Wt Readings from Last 1 Encounters:   01/24/24 81.6 kg (180 lb)     Additional Vital Signs:   01/25/24 07:57:42 98.1 °F (36.7 °C) 99 16 143/85 104 98 % -- --   01/24/24 21:14:18 98.6 °F (37 °C) 89 -- 150/86 107 97 % -- --   01/24/24 1816 98.8 °F (37.1 °C) 88 18 172/81 Abnormal  -- -- -- --   01/24/24 1808 -- -- -- -- -- -- None (Room air) --   01/24/24 15:50:59 98.8 °F (37.1 °C) 88 18 172/81 Abnormal  111 96 %       Pertinent Labs/Diagnostic Test Results:   XR spine lumbar 2 or 3 views injury   Final Result by Baljinder Vasquez MD (01/25 5079)      Redemonstrated osseous sequelae of L5-S1 discitis/osteomyelitis.      Instrumented posterior spinal fusion spanning T12-S1. Lucency again seen at the bone-metal interface of the S1 screws consistent with loosening.      Workstation performed: [id number]         MRI lumbar spine w wo contrast   Final Result by Jin Bassett MD (01/24 2011)      1.  Redemonstrated postsurgical changes status post L1-L5 laminectomies with posterior instrumented fusion spanning T12-S1. Evidence of bilateral S1 pedicle screw loosening, better demonstrated in same-day CT.   2.  Multilevel acute or subacute discitis/osteomyelitis involving postsurgical level L5-S1 and to lesser degree levels L1-2 and L4-5. There is acute or subacute discitis at level L2-3 with minimal subjacent endplate osteomyelitis.   3.  Epidural phlegmonous change pronounced at level L5-S1 without abscess. There is moderate canal stenosis at this level.   4.  Mild ventral epidural phlegmon at level L1-2. Paravertebral  inflammation/phlegmonous change in the infected levels without apparent/drainable abscess.   5.  Degenerative change without high-grade canal or foraminal stenosis as detailed.            The study was marked in EPIC for immediate notification.      Workstation performed: [id number]         CT pelvis wo contrast   Final Result by Anish Ruiz MD (01/24 0446)      No acute fracture.      Numerous nonspecific lucencies throughout the bilateral iliac bones possibly reflective of underlying marrow process although appearance is similar to previous studies dating back to 2020. Consider further work-up as clinically warranted.      Above findings discussed with Dr. Marina at 4:40 a.m. on 1/24/2024.      Workstation performed: [id number]         CT spine lumbar without contrast   Final Result by Anish Ruiz MD (01/24 0452)   Addendum (preliminary) 1 of 1 by Anish Ruiz MD (01/24 0452)   ADDENDUM:      Worsened vacuum disc phenomenon at L5-S1 with stable to minimally    progressed erosive endplate changes at this level compared to the prior    recent examinations, possibly degenerative in etiology and/or related to    screw loosening although infectious    etiology i.e. discitis osteomyelitis not excluded. Clinical correlation    advised. Consider further evaluation with MRI.      Above findings discussed with Dr. Marina at 4:40 a.m. on 1/24/2024.      Final      No acute fracture or traumatic malalignment. No significant change compared to prior recent examinations.      Similar chronic degenerative/postsurgical changes as above.      Similar suspected loosening at the bilateral S1 transpedicular screws.      Workstation performed: [id number]         IR biopsy spine    (Results Pending)       Results from last 7 days   Lab Units 01/26/24  0423 01/25/24  0520 01/24/24  0545   WBC Thousand/uL 10.39* 13.00* 10.09   HEMOGLOBIN g/dL 12.6 12.4 11.4*   HEMATOCRIT % 40.0 40.6 38.1   PLATELETS Thousands/uL 424* 397* 373   NEUTROS ABS  Thousands/µL 6.93 9.41* 7.31     Results from last 7 days   Lab Units 01/26/24  0423 01/25/24  0520 01/24/24  0545 01/23/24  1209   SODIUM mmol/L 134* 134* 137  --    POTASSIUM mmol/L 3.8 4.2 3.7  --    CHLORIDE mmol/L 93* 93* 97  --    CO2 mmol/L 32 30 33*  --    ANION GAP mmol/L 9 11 7  --    BUN mg/dL 14 11 13 16   CREATININE mg/dL 0.81 0.74 0.75 0.82   EGFR ml/min/1.73sq m 92 96 95 92   CALCIUM mg/dL 9.9 9.6 9.2  --      Results from last 7 days   Lab Units 01/24/24  0545   AST U/L 16   ALT U/L 13   ALK PHOS U/L 71   TOTAL PROTEIN g/dL 7.3   ALBUMIN g/dL 3.9   TOTAL BILIRUBIN mg/dL 0.22     Results from last 7 days   Lab Units 01/25/24  2113 01/25/24  1613 01/25/24  1058 01/25/24  0750 01/24/24  2112 01/24/24  1552   POC GLUCOSE mg/dl 142* 196* 296* 155* 123 129     Results from last 7 days   Lab Units 01/26/24  0423 01/25/24  0520 01/24/24  0545   GLUCOSE RANDOM mg/dL 136 138 195*     Results from last 7 days   Lab Units 01/24/24  1519   HEMOGLOBIN A1C % 8.2*   EAG mg/dl 189     Results from last 7 days   Lab Units 01/24/24  1516   LACTIC ACID mmol/L 1.6     Results from last 7 days   Lab Units 01/24/24  0545   CRP mg/L 31.9*   SED RATE mm/hour 41*     Results from last 7 days   Lab Units 01/24/24  1516   BLOOD CULTURE  No Growth at 24 hrs.  No Growth at 24 hrs.       ED Treatment:   Medication Administration from 01/24/2024 0157 to 01/24/2024 1546         Date/Time Order Dose Route Action Comments     01/24/2024 0227 EST acetaminophen (TYLENOL) tablet 975 mg 975 mg Oral Given --     01/24/2024 0227 EST ketorolac (TORADOL) injection 15 mg 15 mg Intramuscular Given --     01/24/2024 0227 EST oxyCODONE (ROXICODONE) IR tablet 5 mg 5 mg Oral Given --     01/24/2024 0535 EST oxyCODONE (ROXICODONE) IR tablet 5 mg 5 mg Oral Given --     01/24/2024 0535 EST methocarbamol (ROBAXIN) tablet 500 mg 500 mg Oral Given --     01/24/2024 0618 EST morphine injection 4 mg 4 mg Intravenous Given --     01/24/2024 0617 EST  ketorolac (TORADOL) injection 15 mg 15 mg Intravenous Given --     01/24/2024 0841 EST sertraline (ZOLOFT) tablet 100 mg 100 mg Oral Given --     01/24/2024 0644 EST clonazePAM (KlonoPIN) tablet 1 mg 1 mg Oral Given --     01/24/2024 0841 EST NIFEdipine (PROCARDIA XL) 24 hr tablet 120 mg 120 mg Oral Given --     01/24/2024 0841 EST losartan (COZAAR) tablet 100 mg 100 mg Oral Given --     01/24/2024 0841 EST hydrochlorothiazide (HYDRODIURIL) tablet 25 mg 25 mg Oral Given --     01/24/2024 0645 EST hydrOXYzine HCL (ATARAX) tablet 25 mg 25 mg Oral Given --     01/24/2024 0915 EST morphine injection 4 mg 4 mg Intravenous Given --     01/24/2024 1435 EST morphine injection 4 mg 4 mg Intravenous Given --          Past Medical History:   Diagnosis Date    Anxiety     Arthritis     Cancer (HCC)     Depression     Diabetes mellitus (HCC)     Hypertension     Liver disease     Mitral valve prolapse     Peripheral neuropathy     Neuropathy    PONV (postoperative nausea and vomiting)     Thyroid disease      Present on Admission:   Benign prostatic hyperplasia with urinary retention   Cirrhosis, alcoholic (HCC)   DM (diabetes mellitus) (HCC)   Herniated nucleus pulposus of lumbosacral region   Thyroid cancer (HCC)   Hypertension      Admitting Diagnosis: Discitis [M46.40]  Osteomyelitis (HCC) [M86.9]  Pain [R52]  Acute exacerbation of chronic low back pain [M54.50, G89.29]  Age/Sex: 68 y.o. male  Admission Orders: 1/24/24 1433 inpatient   Scheduled Medications:  aspirin, 81 mg, Oral, Daily  finasteride, 5 mg, Oral, Daily  folic acid, 2,000 mcg, Oral, Daily  gabapentin, 400 mg, Oral, BID  heparin (porcine), 5,000 Units, Subcutaneous, Q8H CHRISTIE  losartan, 100 mg, Oral, Daily   And  hydrochlorothiazide, 25 mg, Oral, Daily  insulin lispro, 1-6 Units, Subcutaneous, TID AC  insulin lispro, 1-6 Units, Subcutaneous, HS  lidocaine, 1 patch, Topical, Daily  melatonin, 3 mg, Oral, HS  metoprolol succinate, 75 mg, Oral, QAM  mirtazapine, 45  mg, Oral, HS  NIFEdipine, 120 mg, Oral, Daily  pravastatin, 20 mg, Oral, Daily With Dinner  sertraline, 100 mg, Oral, Daily  tamsulosin, 0.4 mg, Oral, Daily With Dinner    Continuous IV Infusions: none      PRN Meds:  acetaminophen, 650 mg, Oral, Q6H PRN  aluminum-magnesium hydroxide-simethicone, 30 mL, Oral, Q6H PRN  clonazePAM, 1 mg, Oral, BID PRN x 1 1/25/24   fluticasone, 1 spray, Nasal, Daily PRN  methocarbamol, 750 mg, Oral, Q8H PRN  morphine injection, 4 mg, Intravenous, Q4H PRN x 1 1/24.  X 1 1/25  ondansetron, 4 mg, Intravenous, Q6H PRN  oxyCODONE, 10 mg, Oral, Q4H PRN x 2 1/24.  X 1 1/25/24   oxyCODONE, 5 mg, Oral, Q4H PRN    Spine brace as needed for comfort  PT/OT      IP CONSULT TO INFECTIOUS DISEASES  IP CONSULT TO NEUROSURGERY  INPATIENT CONSULT TO IR    Network Utilization Review Department  ATTENTION: Please call with any questions or concerns to [phone] and carefully listen to the prompts so that you are directed to the right person. All voicemails are confidential.   For Discharge needs, contact Care Management DC Support Team at [phone] opt. 2  Send all requests for admission clinical reviews, approved or denied determinations and any other requests to dedicated fax number below belonging to the campus where the patient is receiving treatment. List of dedicated fax numbers for the Facilities:  FACILITY NAME UR FAX NUMBER   ADMISSION DENIALS (Administrative/Medical Necessity) [phone]   DISCHARGE SUPPORT TEAM (NETWORK) [phone]   PARENT CHILD HEALTH (Maternity/NICU/Pediatrics) [phone]   [facility] [phone]   [facility] [phone]   [facility] [phone]   [facility] [phone]   [facility] [phone]   [facility] [phone]   [facility] [phone]   [facility]  [facility] [phone]   [facility] [phone]   Counts include 234 beds at the [facility] [phone]   [facility] [phone]

## 2024-01-25 NOTE — PHYSICAL THERAPY NOTE
Physical Therapy Cancellation Note       01/25/24 1126   PT Last Visit   PT Visit Date 01/25/24   Note Type   Note type Cancelled Session   Cancel Reasons Other   Additional Comments Awaiting CT of pelvis to be read.     Soha Page

## 2024-01-25 NOTE — OCCUPATIONAL THERAPY NOTE
Occupational Therapy Cancelled Session        Patient Name: Juan San  Today's Date: 1/25/2024 01/25/24 1143   Note Type   Note type Cancelled Session   Additional Comments Awaiting CT of pelvis to be read. Will complete IP OT IE when CT results are read.     Carmenza Medrano, OTR/L

## 2024-01-25 NOTE — ASSESSMENT & PLAN NOTE
Lab Results   Component Value Date    HGBA1C 8.2 (H) 01/24/2024       Recent Labs     01/24/24  1552 01/24/24  2112 01/25/24  0750 01/25/24  1058   POCGLU 129 123 155* 296*       Blood Sugar Average: Last 72 hrs:  (P) 175.75  Maintained on metformin and jardiance outpatient.   Hold while admitted, can resume on discharge  SSI with meals while admitted

## 2024-01-25 NOTE — PLAN OF CARE
Problem: Potential for Falls  Goal: Patient will remain free of falls  Description: INTERVENTIONS:  - Educate patient/family on patient safety including physical limitations  - Instruct patient to call for assistance with activity   - Consult OT/PT to assist with strengthening/mobility   - Keep Call bell within reach  - Keep bed low and locked with side rails adjusted as appropriate  - Keep care items and personal belongings within reach  - Initiate and maintain comfort rounds  - Make Fall Risk Sign visible to staff  - Offer Toileting every x Hours, in advance of need  - Initiate/Maintain xxalarm  - Obtain necessary fall risk management equipment: x  - Apply yellow socks and bracelet for high fall risk patients  - Consider moving patient to room near nurses station  Outcome: Progressing     Problem: PAIN - ADULT  Goal: Verbalizes/displays adequate comfort level or baseline comfort level  Description: Interventions:  - Encourage patient to monitor pain and request assistance  - Assess pain using appropriate pain scale  - Administer analgesics based on type and severity of pain and evaluate response  - Implement non-pharmacological measures as appropriate and evaluate response  - Consider cultural and social influences on pain and pain management  - Notify physician/advanced practitioner if interventions unsuccessful or patient reports new pain  Outcome: Progressing     Problem: INFECTION - ADULT  Goal: Absence or prevention of progression during hospitalization  Description: INTERVENTIONS:  - Assess and monitor for signs and symptoms of infection  - Monitor lab/diagnostic results  - Monitor all insertion sites, i.e. indwelling lines, tubes, and drains  - Monitor endotracheal if appropriate and nasal secretions for changes in amount and color  - [place] appropriate cooling/warming therapies per order  - Administer medications as ordered  - Instruct and encourage patient and family to use good hand hygiene  technique  - Identify and instruct in appropriate isolation precautions for identified infection/condition  Outcome: Progressing  Goal: Absence of fever/infection during neutropenic period  Description: INTERVENTIONS:  - Monitor WBC    Outcome: Progressing     Problem: SAFETY ADULT  Goal: Patient will remain free of falls  Description: INTERVENTIONS:  - Educate patient/family on patient safety including physical limitations  - Instruct patient to call for assistance with activity   - Consult OT/PT to assist with strengthening/mobility   - Keep Call bell within reach  - Keep bed low and locked with side rails adjusted as appropriate  - Keep care items and personal belongings within reach  - Initiate and maintain comfort rounds  - Make Fall Risk Sign visible to staff  - Offer Toileting every xx Hours, in advance of need  - Initiate/Maintain xalarm  - Obtain necessary fall risk management equipment: x  - Apply yellow socks and bracelet for high fall risk patients  - Consider moving patient to room near nurses station  Outcome: Progressing  Goal: Maintain or return to baseline ADL function  Description: INTERVENTIONS:  -  Assess patient's ability to carry out ADLs; assess patient's baseline for ADL function and identify physical deficits which impact ability to perform ADLs (bathing, care of mouth/teeth, toileting, grooming, dressing, etc.)  - Assess/evaluate cause of self-care deficits   - Assess range of motion  - Assess patient's mobility; develop plan if impaired  - Assess patient's need for assistive devices and provide as appropriate  - Encourage maximum independence but intervene and supervise when necessary  - Involve family in performance of ADLs  - Assess for home care needs following discharge   - Consider OT consult to assist with ADL evaluation and planning for discharge  - Provide patient education as appropriate  Outcome: Progressing  Goal: Maintains/Returns to pre admission functional level  Description:  INTERVENTIONS:  - Perform AM-PAC 6 Click Basic Mobility/ Daily Activity assessment daily.  - Set and communicate daily mobility goal to care team and patient/family/caregiver.   - Collaborate with rehabilitation services on mobility goals if consulted  - Perform Range of Motion x times a day.  - Reposition patient every x hours.  - Dangle patient x times a day  - Stand patient x times a day  - Ambulate patient x times a day  - Out of bed to chair x times a day   - Out of bed for meals x times a day  - Out of bed for toileting  - Record patient progress and toleration of activity level   Outcome: Progressing     Problem: DISCHARGE PLANNING  Goal: Discharge to home or other facility with appropriate resources  Description: INTERVENTIONS:  - Identify barriers to discharge w/patient and caregiver  - Arrange for needed discharge resources and transportation as appropriate  - Identify discharge learning needs (meds, wound care, etc.)  - Arrange for interpretive services to assist at discharge as needed  - Refer to Case Management Department for coordinating discharge planning if the patient needs post-hospital services based on physician/advanced practitioner order or complex needs related to functional status, cognitive ability, or social support system  Outcome: Progressing     Problem: Knowledge Deficit  Goal: Patient/family/caregiver demonstrates understanding of disease process, treatment plan, medications, and discharge instructions  Description: Complete learning assessment and assess knowledge base.  Interventions:  - Provide teaching at level of understanding  - Provide teaching via preferred learning methods  Outcome: Progressing     Problem: MUSCULOSKELETAL - ADULT  Goal: Maintain or return mobility to safest level of function  Description: INTERVENTIONS:  - Assess patient's ability to carry out ADLs; assess patient's baseline for ADL function and identify physical deficits which impact ability to perform ADLs  (bathing, care of mouth/teeth, toileting, grooming, dressing, etc.)  - Assess/evaluate cause of self-care deficits   - Assess range of motion  - Assess patient's mobility  - Assess patient's need for assistive devices and provide as appropriate  - Encourage maximum independence but intervene and supervise when necessary  - Involve family in performance of ADLs  - Assess for home care needs following discharge   - Consider OT consult to assist with ADL evaluation and planning for discharge  - Provide patient education as appropriate  Outcome: Progressing  Goal: Maintain proper alignment of affected body part  Description: INTERVENTIONS:  - Support, maintain and protect limb and body alignment  - Provide patient/ family with appropriate education  Outcome: Progressing

## 2024-01-25 NOTE — CONSULTS
e-Consult ([facility])  - Interventional Radiology  Juan San 68 y.o. male MRN: [id number]  Unit/Bed#: -01 Encounter: [id number]          Interventional Radiology has been consulted to evaluate Juan San    We were consulted by neurosurgery/SLIM concerning this patient with lumbar discitis/osteomyelitis.    Inpatient Consult to IR  Consult performed by: Edison Massey MD  Consult ordered by: Sintia Soto PA-C        01/25/24    Assessment/Recommendation:   67 yo male with h/o alcohol abuse, DM, spinal stenosis/back pain s/p extensive lumbar fixation/fusion (status post T12-S1 posterior instrumented fixation fusion, L1-2 and L4-5 bilateral laminectomies and total facetectomies, L2-3 and L3-4 bilateral laminectomies and medial facetectomies, L5-S1 bilateral laminal foraminotomies on 4/11/2023 by Dr. Moraes in the setting of neurogenic claudication and radiculopathy), presented to ED 1/25/2024 with worsening back pain and fever, currently with mild leukocytosis, and CT and MRI 1/24 demonstrating findings c/w multilevel discitis/osteomyelitis L1-2, L2-3, L4-5, L5-S1 without discrete abscess or collection. Awaiting ID consult and results of Bcx's. Asked by neurosurgery for potential bx, should this be unrevealing. Not currently on atbx.     Bx will be challenging due to patient's overall condition, postsurgical state (including artifact from orthopedic hardware), and considerable disc space narrowing. If we proceed, pt will need to withhold ASA for 5 days (last dose 1/25/2024 in am), so tentatively plan for Tuesday 1/30, depending on Bcx results and ID input.    31 + minutes, >50% of the total time devoted to medical consultative verbal/EMR discussion between providers. Written report will be generated in the EMR.     Thank you for allowing Interventional Radiology to participate in the care of Juan San. Please don't hesitate to call or TigerText us with any questions.     Edison Massey  MD

## 2024-01-25 NOTE — ASSESSMENT & PLAN NOTE
MRI consistent with discitis/osteomyelitis of L1-2, L4-5, L5-S1 regions.  Neurosurgery consulted  Per ER discussion with neurosurgery, obtain blood cultures and hold off on antibiotics pending results unless patient clinically declines  WBC did increase to 13k but afebrile, hemodynamically stable and clinically appears well  Consider transfer if patient becomes unstable/develops red flag symptoms however exam currently stable  Discussed with ID - recommend IR consult for possible biopsy  Blood cultures pending x2

## 2024-01-25 NOTE — CONSULTS
Consultation - Infectious Disease   Juan San 68 y.o. male MRN: [id number]  Unit/Bed#: -01 Encounter: [id number]      Assessment/Plan     Assessment:  68-year-old man with epidural phlegmon, osteomyelitis/discitis, leukocytosis, type 2 diabetes, relatively recent spinal surgeries    Plan:  1) Back infection, leukocytosis - Pt. with findings consistent with infection in lumbar and sacral spine.  Agree with neurosurgical assessment that antibiotics can be held at this time, however would of course began broad coverage (vancomycin, cefepime) should patient develop worsening fever, hypotension, etc.  Given clinical stability on admission growth from blood cultures would be surprising, so will anticipate need for IR biopsy of areas suspicious for infection.  Primary team has discussed this with interventional radiology, patient's aspirin will be held and he is slated for surgical intervention on this admission (1/30).    ===> Will CONTINUE TO OBSERVE OFF OF ABX, and plan for IR biopsy this admission as discussed above.  ===> Further antimicrobial treatment plan to be determined by results of ongoing assessment.  Will continue to monitor patient closely for signs and symptoms of worsening/systemic infection.      2) T2DM - Optimize BG control in the setting of active infection.     ===> Discussed w/ 1' team   ===> Will follow     History of Present Illness   Physician Requesting Consult: Tiffany Cobb MD    HPI:   Briefly, this 68-year-old man with type 2 diabetes, hypertension, and chronic low back pain and complex spinal fusion surgery in April 2023 presented to the [facility] emergency department on 1/24 complaining of worsening and severe back pain as well as subjective fevers. Workup at the time of admission was notable for normal white blood cell count (10,900), however patient is now developed leukocytosis (white blood cell count of 13,000). ESR and CRP are also noted to be elevated at 41, and 31.9 respectively.  Recent A1c is elevated at 8.2. MRI with and without contrast done on admission showed edema and phlegmonous change at the L5-S1 level, as well as L4-L5 edema, and S1-S2 edema and phlegmonous change. Blood cultures drawn at the time of admission are thus far without growth. Patient is presently on antibiotics, however reports pain is reasonably well-controlled with current pain management regimen. Physical exam is notable for lack of tenderness over the above-mentioned areas, but is otherwise unremarkable. Review of available records within the [facility] EMR shows no history extensively drug-resistant organisms.    Inpatient consult to Infectious Diseases  Consult performed by: Antwon Shetty MD  Consult ordered by: Sveta Smith PA-C          Review of Systems  A complete review of systems was done and is negative except as per the HPI.     Historical Information   Past Medical History:   Diagnosis Date    Anxiety     Arthritis     Cancer (HCC)     Depression     Diabetes mellitus (HCC)     Hypertension     Liver disease     Mitral valve prolapse     Peripheral neuropathy     Neuropathy    PONV (postoperative nausea and vomiting)     Thyroid disease      Past Surgical History:   Procedure Laterality Date    COLONOSCOPY      INCISION AND DRAINAGE OF WOUND Left 08/12/2022    Procedure: INCISION AND DRAINAGE (I&D) EXTREMITY;  Surgeon: Moustapha Cote DPM;  Location:  MAIN OR;  Service: Podiatry    JOINT REPLACEMENT Left 03/11/2022    Left TSA    HI ARTHRODESIS POSTERIOR/PSTLAT TQ 1NTRSPC LUMBAR Bilateral 04/11/2023    Procedure: L1-S1 navigated posterior decompression with instrumented fixation fusion;  Surgeon: James Moraes MD;  Location:  MAIN OR;  Service: Neurosurgery    THYROID SURGERY  2021    remove cancer     Social History   Social History     Substance and Sexual Activity   Alcohol Use Yes    Alcohol/week: 4.0 - 7.0 standard drinks of alcohol    Types: 1 - 2 Glasses of wine, 2 - 3 Cans of beer, 1 - 2  Shots of liquor per week    Comment: only on special occasions     Social History     Substance and Sexual Activity   Drug Use Yes    Frequency: 7.0 times per week    Types: Marijuana    Comment: Medical Marijuana     E-Cigarette/Vaping    E-Cigarette Use Current Some Day User     Comments medical marijuana - occ      E-Cigarette/Vaping Substances    Nicotine No     THC No     CBD No     Flavoring No     Other No     Unknown No      Social History     Tobacco Use   Smoking Status Former    Current packs/day: 0.00    Average packs/day: 0.3 packs/day for 5.9 years (1.5 ttl pk-yrs)    Types: Cigarettes    Start date: 1975    Quit date: 1981    Years since quittin.6   Smokeless Tobacco Never   Tobacco Comments    quit 1981     Family History: non-contributory    Meds/Allergies   all current active meds have been reviewed    Allergies   Allergen Reactions    Abilify [Aripiprazole] Tremor     Shaking      Cephalexin Rash    M[employer] Allergic Rhinitis    Pregabalin Tremor     Lyrica - shaking feeling       Objective       Intake/Output Summary (Last 24 hours) at 2024 1158  Last data filed at 2024 0835  Gross per 24 hour   Intake 540 ml   Output 1350 ml   Net -810 ml       Invasive Devices:   Peripheral IV 24 Distal;Right;Upper;Ventral (anterior) Arm (Active)   Site Assessment WDL 24 0000   Dressing Type Transparent 24 0000   Line Status Flushed & Clamped 24 0000   Dressing Status Clean;Dry;Intact 24 0000   Dressing Change Due 24   Reason Not Rotated Not due 24       Physical Exam  Gen: AA, NAD, conversant, VS reviewed  ENT: MMM  CV: No m/r/g, S1, S2  Pulm: Lungs CTAB, no respiratory distress  ABD: S/NT/ND  Skin: No rash on exposed skin  Psych: Oriented, nl. affect     Lab Results: I have personally reviewed pertinent labs.  Imaging Studies: I have personally reviewed pertinent reports.   and I have personally reviewed pertinent films  in PACS  EKG, Pathology, and Other Studies: I have personally reviewed pertinent reports.

## 2024-01-26 ENCOUNTER — TELEPHONE (OUTPATIENT)
Dept: NEUROSURGERY | Facility: CLINIC | Age: 69
End: 2024-01-26

## 2024-01-26 LAB
ANION GAP SERPL CALCULATED.3IONS-SCNC: 9 MMOL/L
BASOPHILS # BLD AUTO: 0.06 THOUSANDS/ÂΜL (ref 0–0.1)
BASOPHILS NFR BLD AUTO: 1 % (ref 0–1)
BUN SERPL-MCNC: 14 MG/DL (ref 5–25)
CALCIUM SERPL-MCNC: 9.9 MG/DL (ref 8.4–10.2)
CHLORIDE SERPL-SCNC: 93 MMOL/L (ref 96–108)
CO2 SERPL-SCNC: 32 MMOL/L (ref 21–32)
CREAT SERPL-MCNC: 0.81 MG/DL (ref 0.6–1.3)
EOSINOPHIL # BLD AUTO: 0.33 THOUSAND/ÂΜL (ref 0–0.61)
EOSINOPHIL NFR BLD AUTO: 3 % (ref 0–6)
ERYTHROCYTE [DISTWIDTH] IN BLOOD BY AUTOMATED COUNT: 17.5 % (ref 11.6–15.1)
GFR SERPL CREATININE-BSD FRML MDRD: 92 ML/MIN/1.73SQ M
GLUCOSE SERPL-MCNC: 135 MG/DL (ref 65–140)
GLUCOSE SERPL-MCNC: 136 MG/DL (ref 65–140)
GLUCOSE SERPL-MCNC: 137 MG/DL (ref 65–140)
GLUCOSE SERPL-MCNC: 162 MG/DL (ref 65–140)
GLUCOSE SERPL-MCNC: 232 MG/DL (ref 65–140)
HCT VFR BLD AUTO: 40 % (ref 36.5–49.3)
HGB BLD-MCNC: 12.6 G/DL (ref 12–17)
IMM GRANULOCYTES # BLD AUTO: 0.05 THOUSAND/UL (ref 0–0.2)
IMM GRANULOCYTES NFR BLD AUTO: 1 % (ref 0–2)
LYMPHOCYTES # BLD AUTO: 2.1 THOUSANDS/ÂΜL (ref 0.6–4.47)
LYMPHOCYTES NFR BLD AUTO: 20 % (ref 14–44)
MCH RBC QN AUTO: 25.6 PG (ref 26.8–34.3)
MCHC RBC AUTO-ENTMCNC: 31.5 G/DL (ref 31.4–37.4)
MCV RBC AUTO: 81 FL (ref 82–98)
MONOCYTES # BLD AUTO: 0.92 THOUSAND/ÂΜL (ref 0.17–1.22)
MONOCYTES NFR BLD AUTO: 9 % (ref 4–12)
NEUTROPHILS # BLD AUTO: 6.93 THOUSANDS/ÂΜL (ref 1.85–7.62)
NEUTS SEG NFR BLD AUTO: 66 % (ref 43–75)
NRBC BLD AUTO-RTO: 0 /100 WBCS
PLATELET # BLD AUTO: 424 THOUSANDS/UL (ref 149–390)
PMV BLD AUTO: 9.7 FL (ref 8.9–12.7)
POTASSIUM SERPL-SCNC: 3.8 MMOL/L (ref 3.5–5.3)
RBC # BLD AUTO: 4.92 MILLION/UL (ref 3.88–5.62)
SODIUM SERPL-SCNC: 134 MMOL/L (ref 135–147)
WBC # BLD AUTO: 10.39 THOUSAND/UL (ref 4.31–10.16)

## 2024-01-26 PROCEDURE — 97167 OT EVAL HIGH COMPLEX 60 MIN: CPT

## 2024-01-26 PROCEDURE — 97760 ORTHOTIC MGMT&TRAING 1ST ENC: CPT

## 2024-01-26 PROCEDURE — 99232 SBSQ HOSP IP/OBS MODERATE 35: CPT | Performed by: PHYSICIAN ASSISTANT

## 2024-01-26 PROCEDURE — 85025 COMPLETE CBC W/AUTO DIFF WBC: CPT | Performed by: PHYSICIAN ASSISTANT

## 2024-01-26 PROCEDURE — 80048 BASIC METABOLIC PNL TOTAL CA: CPT | Performed by: PHYSICIAN ASSISTANT

## 2024-01-26 PROCEDURE — 97163 PT EVAL HIGH COMPLEX 45 MIN: CPT

## 2024-01-26 PROCEDURE — 82948 REAGENT STRIP/BLOOD GLUCOSE: CPT

## 2024-01-26 RX ADMIN — Medication 3 MG: at 20:54

## 2024-01-26 RX ADMIN — INSULIN LISPRO 1 UNITS: 100 INJECTION, SOLUTION INTRAVENOUS; SUBCUTANEOUS at 09:31

## 2024-01-26 RX ADMIN — INSULIN LISPRO 3 UNITS: 100 INJECTION, SOLUTION INTRAVENOUS; SUBCUTANEOUS at 12:08

## 2024-01-26 RX ADMIN — OXYCODONE HYDROCHLORIDE 10 MG: 10 TABLET ORAL at 04:13

## 2024-01-26 RX ADMIN — HYDROCHLOROTHIAZIDE 25 MG: 25 TABLET ORAL at 09:33

## 2024-01-26 RX ADMIN — MORPHINE SULFATE 4 MG: 4 INJECTION, SOLUTION INTRAMUSCULAR; INTRAVENOUS at 20:09

## 2024-01-26 RX ADMIN — MORPHINE SULFATE 4 MG: 4 INJECTION, SOLUTION INTRAMUSCULAR; INTRAVENOUS at 06:31

## 2024-01-26 RX ADMIN — FOLIC ACID 2000 MCG: 1 TABLET ORAL at 09:33

## 2024-01-26 RX ADMIN — TAMSULOSIN HYDROCHLORIDE 0.4 MG: 0.4 CAPSULE ORAL at 15:57

## 2024-01-26 RX ADMIN — LOSARTAN POTASSIUM 100 MG: 50 TABLET, FILM COATED ORAL at 09:32

## 2024-01-26 RX ADMIN — GABAPENTIN 400 MG: 400 CAPSULE ORAL at 09:33

## 2024-01-26 RX ADMIN — FINASTERIDE 5 MG: 5 TABLET, FILM COATED ORAL at 09:34

## 2024-01-26 RX ADMIN — LIDOCAINE 5% 1 PATCH: 700 PATCH TOPICAL at 09:34

## 2024-01-26 RX ADMIN — MIRTAZAPINE 45 MG: 15 TABLET, FILM COATED ORAL at 20:54

## 2024-01-26 RX ADMIN — HEPARIN SODIUM 5000 UNITS: 5000 INJECTION INTRAVENOUS; SUBCUTANEOUS at 05:46

## 2024-01-26 RX ADMIN — GABAPENTIN 400 MG: 400 CAPSULE ORAL at 17:26

## 2024-01-26 RX ADMIN — NIFEDIPINE 120 MG: 30 TABLET, EXTENDED RELEASE ORAL at 09:34

## 2024-01-26 RX ADMIN — HEPARIN SODIUM 5000 UNITS: 5000 INJECTION INTRAVENOUS; SUBCUTANEOUS at 14:58

## 2024-01-26 RX ADMIN — METOPROLOL SUCCINATE 75 MG: 50 TABLET, EXTENDED RELEASE ORAL at 09:32

## 2024-01-26 RX ADMIN — SERTRALINE HYDROCHLORIDE 100 MG: 100 TABLET ORAL at 09:32

## 2024-01-26 RX ADMIN — OXYCODONE HYDROCHLORIDE 10 MG: 10 TABLET ORAL at 09:33

## 2024-01-26 RX ADMIN — HEPARIN SODIUM 5000 UNITS: 5000 INJECTION INTRAVENOUS; SUBCUTANEOUS at 20:54

## 2024-01-26 RX ADMIN — MORPHINE SULFATE 4 MG: 4 INJECTION, SOLUTION INTRAMUSCULAR; INTRAVENOUS at 15:58

## 2024-01-26 RX ADMIN — PRAVASTATIN SODIUM 20 MG: 20 TABLET ORAL at 15:57

## 2024-01-26 RX ADMIN — MORPHINE SULFATE 4 MG: 4 INJECTION, SOLUTION INTRAMUSCULAR; INTRAVENOUS at 01:52

## 2024-01-26 NOTE — TREATMENT PLAN
Upright imaging completed and reviewed as below.    Imagin/25 upright lumbar x-rays: Redemonstrated osseous sequelae of L5-S1 discitis/osteomyelitis. Instrumented posterior spinal fusion spanning T12-S1. Lucency again seen at the bone-metal interface of the S1 screws consistent with loosening.    Plan:   Continue to closely monitor neuro exam   Frequent neuro checks per primary team  Maintain normotensive BP goals, MAP > 65   No acute neuro surgical intervention indicated at this time   Case and imaging reviewed this am on rounds   Upright x-rays demonstrate overall stable alignment with noted, stable screw loosening, unchanged from previous outpt imaging   Ongoing infectious workup per primary team   ID consulted    ESR 41, CRP 31.9   BC - no growth at 24hrs   IR consulted for biopsy --> tentative plan for biopsy on Tuesday after 5 days of ASA hold   Please reach out to nsgy if biopsy unable to be achieved by IR   Continue abs per ID recs   Recommend continued conservative management  TLSO brace to be worn as needed for pain   Maintain lumbar precautions   No bending, twisting, or lifting > 5-10lbs   Pain control per primary team   PT/OT   DVT ppx: zhanna Sims for chem dvt ppx from a nsgy standpoint   Medical management per primary team   Social work following for assistance with dispo once medically cleared     Neurosurgery will sign off at this time. Will plan to follow-up with the pt again in approx 4 weeks with repeat upright xrays. Please reach out with any further questions or concerns.

## 2024-01-26 NOTE — PROGRESS NOTES
[facility]  Progress Note  Name: Juan San I  MRN: [id number]  Unit/Bed#: -01 I Date of Admission: 1/24/2024   Date of Service: 1/26/2024 I Hospital Day: 2    Assessment/Plan   * Discitis of lumbosacral region  Assessment & Plan  MRI consistent with discitis/osteomyelitis of L1-2, L4-5, L5-S1 regions.  Neurosurgery consulted  Per ER discussion with neurosurgery, obtain blood cultures and hold off on antibiotics pending results unless patient clinically declines  WBC 10k this AM  Consider transfer if patient becomes unstable/develops red flag symptoms however exam remains stable  Discussed with ID - recommend IR consult for possible biopsy  Earliest will be Tuesday, 1/30 as pt was on ASA and needs to be held for 5 days  Blood cultures negative x2 after 24 hrs    Hypertension  Assessment & Plan  Continued on 120 mg nifedipine every morning, Hyzaar, metoprolol  Elevated BP on presentation likely multifactorial in setting of pain, missed doses of home antihypertensives  Now stable    Benign prostatic hyperplasia with urinary retention  Assessment & Plan  Continue finasteride and tamsulosin  Urinary retention protocol    Thyroid cancer (HCC)  Assessment & Plan  S/P partial thyroidectomy  Not maintained on levothyroxine - levels have been stable  Follows with Horacio Jackson - in remission    Herniated nucleus pulposus of lumbosacral region  Assessment & Plan  S/P surgical intervention April 2023    DM (diabetes mellitus) (HCC)  Assessment & Plan  Lab Results   Component Value Date    HGBA1C 8.2 (H) 01/24/2024       Recent Labs     01/25/24  1613 01/25/24  2113 01/26/24  0800 01/26/24  1135   POCGLU 196* 142* 162* 232*         Blood Sugar Average: Last 72 hrs:  (P) 179.375  Maintained on metformin and jardiance outpatient.   Hold while admitted, can resume on discharge  SSI with meals while admitted    Cirrhosis, alcoholic (HCC)  Assessment & Plan  Continue outpatient follow-up  Follows  with PCP         VTE Pharmacologic Prophylaxis: VTE Score: 3 Moderate Risk (Score 3-4) - Pharmacological DVT Prophylaxis Ordered: heparin.    Mobility:   Basic Mobility Inpatient Raw Score: 19  JH-HLM Goal: 6: Walk 10 steps or more  JH-HLM Achieved: 7: Walk 25 feet or more  HLM Goal achieved. Continue to encourage appropriate mobility.    Patient Centered Rounds: I performed bedside rounds with nursing staff today.   Discussions with Specialists or Other Care Team Provider: YANN    Education and Discussions with Family / Patient: Patient declined call to .     Total Time Spent on Date of Encounter in care of patient: 35 mins. This time was spent on one or more of the following: performing physical exam; counseling and coordination of care; obtaining or reviewing history; documenting in the medical record; reviewing/ordering tests, medications or procedures; communicating with other healthcare professionals and discussing with patient's family/caregivers.    Current Length of Stay: 2 day(s)  Current Patient Status: Inpatient   Certification Statement: The patient will continue to require additional inpatient hospital stay due to spinal biopsy  Discharge Plan: Anticipate discharge in >72 hrs to discharge location to be determined pending rehab evaluations.    Code Status: Level 1 - Full Code    Subjective:   Denies any complaints, back pain is stable.  Denies chest pain/palpitations, shortness of breath, nausea/vomiting, abdominal pain.  Denies saddle anesthesia, urinary/bowel incontinence.    Objective:     Vitals:   Temp (24hrs), Av.3 °F (36.8 °C), Min:97.9 °F (36.6 °C), Max:98.8 °F (37.1 °C)    Temp:  [97.9 °F (36.6 °C)-98.8 °F (37.1 °C)] 97.9 °F (36.6 °C)  HR:  [] 76  Resp:  [20] 20  BP: (132-160)/(80-96) 147/80  SpO2:  [95 %-98 %] 95 %  Body mass index is 26.58 kg/m².     Input and Output Summary (last 24 hours):   No intake or output data in the 24 hours ending 24 1344    Physical  Exam:   Physical Exam  Vitals and nursing note reviewed.   Constitutional:       General: He is not in acute distress.     Appearance: He is well-developed.      Comments: No acute distress   HENT:      Head: Normocephalic and atraumatic.   Eyes:      General: No scleral icterus.     Extraocular Movements: Extraocular movements intact.      Conjunctiva/sclera: Conjunctivae normal.   Cardiovascular:      Rate and Rhythm: Normal rate and regular rhythm.      Heart sounds: No murmur heard.  Pulmonary:      Effort: Pulmonary effort is normal. No respiratory distress.      Breath sounds: Normal breath sounds. No wheezing, rhonchi or rales.   Abdominal:      General: Bowel sounds are normal.      Palpations: Abdomen is soft.      Tenderness: There is no abdominal tenderness. There is no guarding or rebound.   Musculoskeletal:         General: No swelling.      Cervical back: Normal range of motion.      Comments: Strength 5/5 throughout no edema   Skin:     General: Skin is warm and dry.      Capillary Refill: Capillary refill takes less than 2 seconds.   Neurological:      General: No focal deficit present.      Mental Status: He is alert and oriented to person, place, and time.   Psychiatric:         Mood and Affect: Mood normal.         Speech: Speech normal.         Behavior: Behavior normal.          Additional Data:     Labs:  Results from last 7 days   Lab Units 01/26/24  0423   WBC Thousand/uL 10.39*   HEMOGLOBIN g/dL 12.6   HEMATOCRIT % 40.0   PLATELETS Thousands/uL 424*   NEUTROS PCT % 66   LYMPHS PCT % 20   MONOS PCT % 9   EOS PCT % 3     Results from last 7 days   Lab Units 01/26/24  0423 01/25/24  0520 01/24/24  0545   SODIUM mmol/L 134*   < > 137   POTASSIUM mmol/L 3.8   < > 3.7   CHLORIDE mmol/L 93*   < > 97   CO2 mmol/L 32   < > 33*   BUN mg/dL 14   < > 13   CREATININE mg/dL 0.81   < > 0.75   ANION GAP mmol/L 9   < > 7   CALCIUM mg/dL 9.9   < > 9.2   ALBUMIN g/dL  --   --  3.9   TOTAL BILIRUBIN mg/dL  --    --  0.22   ALK PHOS U/L  --   --  71   ALT U/L  --   --  13   AST U/L  --   --  16   GLUCOSE RANDOM mg/dL 136   < > 195*    < > = values in this interval not displayed.         Results from last 7 days   Lab Units 01/26/24  1135 01/26/24  0800 01/25/24  2113 01/25/24  1613 01/25/24  1058 01/25/24  0750 01/24/24  2112 01/24/24  1552   POC GLUCOSE mg/dl 232* 162* 142* 196* 296* 155* 123 129     Results from last 7 days   Lab Units 01/24/24  1519   HEMOGLOBIN A1C % 8.2*     Results from last 7 days   Lab Units 01/24/24  1516   LACTIC ACID mmol/L 1.6       Lines/Drains:  Invasive Devices       Peripheral Intravenous Line  Duration             Peripheral IV 01/24/24 Distal;Right;Upper;Ventral (anterior) Arm 2 days                          Imaging: No pertinent imaging reviewed.    Recent Cultures (last 7 days):   Results from last 7 days   Lab Units 01/24/24  1516   BLOOD CULTURE  No Growth at 24 hrs.  No Growth at 24 hrs.       Last 24 Hours Medication List:   Current Facility-Administered Medications   Medication Dose Route Frequency Provider Last Rate    acetaminophen  650 mg Oral Q6H PRN Sveta Smith PA-C      aluminum-magnesium hydroxide-simethicone  30 mL Oral Q6H PRN Sveta Smith PA-C      clonazePAM  1 mg Oral BID PRN Leatha Shavonne DO Silvio      finasteride  5 mg Oral Daily Sveta Smith PA-C      fluticasone  1 spray Nasal Daily PRN Sveta Smith PA-C      folic acid  2,000 mcg Oral Daily Sveta Smith PA-C      gabapentin  400 mg Oral BID Sveta Smith PA-C      heparin (porcine)  5,000 Units Subcutaneous Q8H CHRISTIE Sveta Smith PA-C      losartan  100 mg Oral Daily Adelita Marina, DO      And    hydroCHLOROthiazide  25 mg Oral Daily Adelita Marina, DO      insulin lispro  1-6 Units Subcutaneous TID AC Sveta Smith PA-C      insulin lispro  1-6 Units Subcutaneous HS Sveta Smith PA-C      lidocaine  1 patch Topical Daily Sveta Smith PA-C      melatonin  3 mg Oral HS Sveta Smith PA-C      methocarbamol  750 mg Oral Q8H PRN Sveta  Luis, DANIEL      metoprolol succinate  75 mg Oral QAM Sveta Smith, DANIEL      mirtazapine  45 mg Oral HS Sveta Smith, DANIEL      morphine injection  4 mg Intravenous Q4H PRN Sveta Smith, DANIEL      NIFEdipine  120 mg Oral Daily Adelita Marina,       ondansetron  4 mg Intravenous Q6H PRN Sveta Smith, DANIEL      oxyCODONE  10 mg Oral Q4H PRN Sveta Smith, DANIEL      oxyCODONE  5 mg Oral Q4H PRN Sveta Smith, DANIEL      pravastatin  20 mg Oral Daily With Dinner Sveta Smith, DANIEL      sertraline  100 mg Oral Daily Adelita Marina,       tamsulosin  0.4 mg Oral Daily With Dinner Sveta Smith, DANIEL          Today, Patient Was Seen By: Sintia Soto PA-C    **Please Note: This note may have been constructed using a voice recognition system.**

## 2024-01-26 NOTE — PLAN OF CARE
Problem: Potential for Falls  Goal: Patient will remain free of falls  Description: INTERVENTIONS:  - Educate patient/family on patient safety including physical limitations  - Instruct patient to call for assistance with activity   - Consult OT/PT to assist with strengthening/mobility   - Keep Call bell within reach  - Keep bed low and locked with side rails adjusted as appropriate  - Keep care items and personal belongings within reach  - Initiate and maintain comfort rounds  - Make Fall Risk Sign visible to staff  - Offer Toileting every  Hours, in advance of need  - Initiate/Maintain alarm  - Obtain necessary fall risk management equipment:   - Apply yellow socks and bracelet for high fall risk patients  - Consider moving patient to room near nurses station  Outcome: Progressing     Problem: PAIN - ADULT  Goal: Verbalizes/displays adequate comfort level or baseline comfort level  Description: Interventions:  - Encourage patient to monitor pain and request assistance  - Assess pain using appropriate pain scale  - Administer analgesics based on type and severity of pain and evaluate response  - Implement non-pharmacological measures as appropriate and evaluate response  - Consider cultural and social influences on pain and pain management  - Notify physician/advanced practitioner if interventions unsuccessful or patient reports new pain  Outcome: Progressing     Problem: INFECTION - ADULT  Goal: Absence or prevention of progression during hospitalization  Description: INTERVENTIONS:  - Assess and monitor for signs and symptoms of infection  - Monitor lab/diagnostic results  - Monitor all insertion sites, i.e. indwelling lines, tubes, and drains  - Monitor endotracheal if appropriate and nasal secretions for changes in amount and color  - [place] appropriate cooling/warming therapies per order  - Administer medications as ordered  - Instruct and encourage patient and family to use good hand hygiene technique  -  Identify and instruct in appropriate isolation precautions for identified infection/condition  Outcome: Progressing  Goal: Absence of fever/infection during neutropenic period  Description: INTERVENTIONS:  - Monitor WBC    Outcome: Progressing     Problem: SAFETY ADULT  Goal: Patient will remain free of falls  Description: INTERVENTIONS:  - Educate patient/family on patient safety including physical limitations  - Instruct patient to call for assistance with activity   - Consult OT/PT to assist with strengthening/mobility   - Keep Call bell within reach  - Keep bed low and locked with side rails adjusted as appropriate  - Keep care items and personal belongings within reach  - Initiate and maintain comfort rounds  - Make Fall Risk Sign visible to staff  - Offer Toileting every  Hours, in advance of need  - Initiate/Maintain alarm  - Obtain necessary fall risk management equipment:   - Apply yellow socks and bracelet for high fall risk patients  - Consider moving patient to room near nurses station  Outcome: Progressing  Goal: Maintain or return to baseline ADL function  Description: INTERVENTIONS:  -  Assess patient's ability to carry out ADLs; assess patient's baseline for ADL function and identify physical deficits which impact ability to perform ADLs (bathing, care of mouth/teeth, toileting, grooming, dressing, etc.)  - Assess/evaluate cause of self-care deficits   - Assess range of motion  - Assess patient's mobility; develop plan if impaired  - Assess patient's need for assistive devices and provide as appropriate  - Encourage maximum independence but intervene and supervise when necessary  - Involve family in performance of ADLs  - Assess for home care needs following discharge   - Consider OT consult to assist with ADL evaluation and planning for discharge  - Provide patient education as appropriate  Outcome: Progressing  Goal: Maintains/Returns to pre admission functional level  Description:  INTERVENTIONS:  - Perform AM-PAC 6 Click Basic Mobility/ Daily Activity assessment daily.  - Set and communicate daily mobility goal to care team and patient/family/caregiver.   - Collaborate with rehabilitation services on mobility goals if consulted  - Perform Range of Motion  times a day.  - Reposition patient every  hours.  - Dangle patient  times a day  - Stand patient  times a day  - Ambulate patient  times a day  - Out of bed to chair  times a day   - Out of bed for meals times a day  - Out of bed for toileting  - Record patient progress and toleration of activity level   Outcome: Progressing     Problem: DISCHARGE PLANNING  Goal: Discharge to home or other facility with appropriate resources  Description: INTERVENTIONS:  - Identify barriers to discharge w/patient and caregiver  - Arrange for needed discharge resources and transportation as appropriate  - Identify discharge learning needs (meds, wound care, etc.)  - Arrange for interpretive services to assist at discharge as needed  - Refer to Case Management Department for coordinating discharge planning if the patient needs post-hospital services based on physician/advanced practitioner order or complex needs related to functional status, cognitive ability, or social support system  Outcome: Progressing     Problem: Knowledge Deficit  Goal: Patient/family/caregiver demonstrates understanding of disease process, treatment plan, medications, and discharge instructions  Description: Complete learning assessment and assess knowledge base.  Interventions:  - Provide teaching at level of understanding  - Provide teaching via preferred learning methods  Outcome: Progressing     Problem: MUSCULOSKELETAL - ADULT  Goal: Maintain or return mobility to safest level of function  Description: INTERVENTIONS:  - Assess patient's ability to carry out ADLs; assess patient's baseline for ADL function and identify physical deficits which impact ability to perform ADLs  (bathing, care of mouth/teeth, toileting, grooming, dressing, etc.)  - Assess/evaluate cause of self-care deficits   - Assess range of motion  - Assess patient's mobility  - Assess patient's need for assistive devices and provide as appropriate  - Encourage maximum independence but intervene and supervise when necessary  - Involve family in performance of ADLs  - Assess for home care needs following discharge   - Consider OT consult to assist with ADL evaluation and planning for discharge  - Provide patient education as appropriate  Outcome: Progressing  Goal: Maintain proper alignment of affected body part  Description: INTERVENTIONS:  - Support, maintain and protect limb and body alignment  - Provide patient/ family with appropriate education  Outcome: Progressing

## 2024-01-26 NOTE — PLAN OF CARE
Problem: OCCUPATIONAL THERAPY ADULT  Goal: Performs self-care activities at highest level of function for planned discharge setting.  See evaluation for individualized goals.  Description: Treatment Interventions: ADL retraining, Functional transfer training, Endurance training, Patient/family training, Equipment evaluation/education, Compensatory technique education, Continued evaluation, Activityengagement, Energy conservation          See flowsheet documentation for full assessment, interventions and recommendations.   Note: Limitation: Decreased ADL status, Decreased Safe judgement during ADL, Decreased endurance, Decreased high-level ADLs, Decreased self-care trans  Prognosis: Good  Assessment: Pt is a 68 y.o. male seen for OT evaluation s/p admission to [facility] on 1/24/2024 due to back pain. Diagnosed with Discitis of lumbosacral region. Personal and env factors supporting pt at time of IE include age, supportive wife, and (A) with all ADLs. Personal and env factors inhibiting engagement in occupations include difficulty completing ADLs and difficulty completing IADLs. Performance deficits that affect the pt’s occupational performance can be seen above. Due to pt's current functional limitations and medical complications pt is functioning below baseline. Pt would benefit from continued skilled OT treatment in order to maximize safety, independence and overall performance with ADLs, functional mobility, and functional transfers in order to achieve highest level of function.     Rehab Resource Intensity Level, OT: III (Minimum Resource Intensity)

## 2024-01-26 NOTE — PHYSICAL THERAPY NOTE
PHYSICAL THERAPY EVAL  Physical Therapy Evaluation    Performed at least 2 patient identifiers during session:  Patient Active Problem List   Diagnosis    Alcoholism in remission (HCC)    Benzodiazepine dependence (HCC)    Bilateral adhesive capsulitis of shoulders    Bronchitis, mucopurulent recurrent (HCC)    Cirrhosis, alcoholic (HCC)    Diabetic peripheral neuropathy (HCC)    DM (diabetes mellitus) (HCC)    Herniated nucleus pulposus of lumbosacral region    Hypercholesterolemia    Lumbar spondylosis    Thyroid cancer (HCC)    Liver disease    Chronic pain syndrome    Chronic bilateral low back pain without sciatica    PONV (postoperative nausea and vomiting)    Medical marijuana use    Urinary retention    Anemia    Hyponatremia    Gallstones    Status post lumbar spinal fusion    Benign prostatic hyperplasia with urinary retention    Scrotal pain    Lower urinary tract symptoms    Fall    Chronic bilateral low back pain with sciatica    Anxiety and depression    Hypertension    Hypochromic microcytic anemia    Discitis of lumbosacral region       Past Medical History:   Diagnosis Date    Anxiety     Arthritis     Cancer (HCC)     Depression     Diabetes mellitus (HCC)     Hypertension     Liver disease     Mitral valve prolapse     Peripheral neuropathy     Neuropathy    PONV (postoperative nausea and vomiting)     Thyroid disease        Past Surgical History:   Procedure Laterality Date    COLONOSCOPY      INCISION AND DRAINAGE OF WOUND Left 08/12/2022    Procedure: INCISION AND DRAINAGE (I&D) EXTREMITY;  Surgeon: Moustapha Cote DPM;  Location:  MAIN OR;  Service: Podiatry    JOINT REPLACEMENT Left 03/11/2022    Left TSA    CT ARTHRODESIS POSTERIOR/PSTLAT TQ 1NTRSPC LUMBAR Bilateral 04/11/2023    Procedure: L1-S1 navigated posterior decompression with instrumented fixation fusion;  Surgeon: James Moraes MD;  Location:   "MAIN OR;  Service: Neurosurgery    THYROID SURGERY  2021    remove cancer            01/26/24 0957   PT Last Visit   PT Visit Date 01/26/24   Note Type   Note type Evaluation;Orthotic Management/Training   Type of Brace   Brace Applied [place] LSO   Patient Position When Brace Applied Seated   Bracing Recommendations   (As needed for comfort.)   Education   Education Provided Yes   End of Consult   Patient Position at End of Consult Supine;All needs within reach;Bed/Chair alarm activated   Nurse Communication Nurse aware of consult, application of brace   Pain Assessment   Pain Assessment Tool 0-10   Pain Score 10 - Worst Possible Pain   Pain Location/Orientation Location: Back   Hospital Pain Intervention(s) Medication (See MAR);Ambulation/increased activity   Restrictions/Precautions   Weight Bearing Precautions Per Order No   Braces or Orthoses LSO   Other Precautions Spinal precautions;Pain;Fall Risk;Bed Alarm   Home Living   Type of Home Apartment   Home Layout   (19STE)   Bathroom Accessibility Accessible   Home Equipment Walker;Cane   Prior Function   Level of [place] Independent with functional mobility;Needs assistance with IADLS;Needs assistance with ADLs   Lives With Spouse   Receives Help From Family;Personal care attendant   IADLs Family/Friend/Other provides transportation;Family/Friend/Other provides meals;Family/Friend/Other provides medication management   Falls in the last 6 months >10   Comments Reports that he will have HHA's when spouse is at work   General   Additional Pertinent History Hx of spinal surgery.   Family/Caregiver Present No   Cognition   Overall Cognitive Status WFL   Arousal/Participation Alert   Attention Within functional limits   Orientation Level Oriented X4   Memory Within functional limits   Following Commands Follows one step commands without difficulty   Subjective   Subjective \"The pain is bad today.\"   RLE Assessment   RLE Assessment WFL   LLE Assessment   LLE " Assessment WFL   Bed Mobility   Rolling R 5  Supervision   Additional items HOB elevated;Bedrails;Increased time required;Verbal cues   Supine to Sit 5  Supervision   Additional items HOB elevated;Bedrails;Increased time required;Verbal cues   Sit to Supine 5  Supervision   Additional items HOB elevated;Bedrails;Increased time required;Verbal cues   Transfers   Sit to Stand 5  Supervision   Additional items Armrests;Verbal cues   Stand to Sit 5  Supervision   Additional items Armrests;Verbal cues   Ambulation/Elevation   Gait pattern Foward flexed  (Increased distance from RW)   Gait Assistance 5  Supervision   Additional items Verbal cues   Assistive Device Rolling walker   Distance 50ft   Balance   Static Sitting Normal   Dynamic Sitting Good   Static Standing Fair +   Dynamic Standing Fair +   Ambulatory Fair +   Endurance Deficit   Endurance Deficit Yes   Endurance Deficit Description Limited by pain   Activity Tolerance   Activity Tolerance Patient limited by pain   Medical Staff Made Aware OTAimee   Nurse Made Aware RNErika   Assessment   Prognosis Guarded   Problem List Decreased strength;Decreased endurance;Impaired balance;Decreased mobility;Impaired sensation;Orthopedic restrictions;Pain   Assessment Patient is a 69y/o M with discitis/osteomyelitis of L1-2, L4-5, L5-S1 regions. Patient resides with spouse in an apartment with steps to enter. He is independent with mobility and has assistance with ADL's/IADL's. Per patient, he is never alone and has caregivers when spouse is working. He uses a SPC and RW at baseline. Current medical status includes pain, spinal precautions, LSO, fall risk, bed/chair alarm, decreased sensation, strength, balance, endurance and mobility. Patient was fitted with an LSO and education was provided. Patient was also educated on spinal precautions as he did not appear to maintain then when sitting edge of bed. Also needed verbal cues for log roll technique to get in/out of  bed. Patient was at a supervision level for bed mobility, transfers, and amb. Increased time to complete due to pain. Heavy reliance on RW. Patient will benefit from level 3 resources. Low intensity. Will need a stair trial as well. The patient's AM-[facility] Basic Mobility Inpatient Short Form Raw Score is 19. A Raw score of greater than 17 suggests the patient may benefit from discharge to home. Please also refer to the recommendation of the Physical Therapist for safe discharge planning.   Barriers to Discharge Inaccessible home environment   Barriers to Discharge Comments Will need a stair trial. Will have assistance to get in/out of apartment   Goals   Patient Goals To have less pain   STG Expiration Date 02/09/24   Short Term Goal #1 1. perform supine<>sit with HOB flat without the use of bedrails ind 2. perform sit<>stand transfers mod I 3. Ambulate 300ft with a RW mod I elvel 4. Ascend/descend a ful flight of steps mod I 5. Maintain spinal precautions 100% of the time. 6. Don/ Doff LSO independently.   PT Treatment Day 0   Plan   Treatment/Interventions LE strengthening/ROM;Functional transfer training;Elevations;Therapeutic exercise;Endurance training;Patient/family training;Equipment eval/education;Bed mobility;Gait training;Spoke to nursing;OT   PT Frequency 3-5x/wk   Discharge Recommendation   Rehab Resource Intensity Level, PT III (Minimum Resource Intensity)   AM-PAC Basic Mobility Inpatient   Turning in Flat Bed Without Bedrails 3   Lying on Back to Sitting on Edge of Flat Bed Without Bedrails 3   Moving Bed to Chair 3   Standing Up From Chair Using Arms 3   Walk in Room 3   Climb 3-5 Stairs With Railing 4   Basic Mobility Inpatient Raw Score 19   Basic Mobility Standardized Score 42.48   Highest Level Of Mobility   JH-HLM Goal 6: Walk 10 steps or more   JH-HLM Achieved 7: Walk 25 feet or more   End of Consult   Patient Position at End of Consult Supine;Bed/Chair alarm activated;All needs within reach    Soha Page, PT             Patient Name: Juan San  Today's Date: 1/26/2024

## 2024-01-26 NOTE — PROGRESS NOTES
Progress Note - Infectious Disease   Juan San 68 y.o. male MRN: [id number]  Unit/Bed#: -01 Encounter: [id number]    Assessment:  68-year-old man with epidural phlegmon, osteomyelitis/discitis, leukocytosis, type 2 diabetes, relatively recent spinal surgeries     Plan:  1) Back infection, leukocytosis - Pt. with findings consistent with infection in lumbar and sacral spine. Agree with neurosurgical assessment that antibiotics can be held at this time, however would of course began broad coverage (vancomycin, cefepime) should patient develop worsening fever, hypotension, etc. will continue to monitor off of antibiotics whilst awaiting washout of antiplatelet agents to facilitate IR guided biopsy and culture.     ===> Will CONTINUE TO OBSERVE OFF OF ABX, and plan for IR biopsy this admission as discussed above.  ===> Further antimicrobial treatment plan to be determined by results of ongoing assessment.  Will continue to monitor patient closely for signs and symptoms of worsening/systemic infection.        2) T2DM - Optimize BG control in the setting of active infection.      ===> Will f/u formally with pt. in 72h, but will monitor relevant clinical data in the interim, which may include changes to the patient's antibiotic regimen as culture data become available. Please do not hesitate to contact ID attending on call should questions arise, or if a change in pt's clinical status should warrant.     Subjective/Objective   Overnight, no acute events.  Patient denies fever, chills, nausea, vomiting.  Blood cultures remain without growth.  Patient reports significant pain with movement, however this is coming under control compared with prior to admission.    Temp:  [97.9 °F (36.6 °C)-98.8 °F (37.1 °C)] 98.2 °F (36.8 °C)  HR:  [72-86] 72  Resp:  [18-20] 18  BP: (130-147)/(70-87) 130/70  SpO2:  [95 %-99 %] 99 %  Temp (24hrs), Av.3 °F (36.8 °C), Min:97.9 °F (36.6 °C), Max:98.8 °F (37.1 °C)  Current:  Temperature: 98.2 °F (36.8 °C)    Physical Exam:   Gen: AA, NAD, conversant, VS reviewed  ENT: MMM  CV: No m/r/g, S1, S2  Pulm: Lungs CTAB, no respiratory distress  ABD: S/NT/ND  Skin: No rash on exposed skin  Psych: Oriented, nl. affect     Invasive Devices       Peripheral Intravenous Line  Duration             Peripheral IV 01/24/24 Distal;Right;Upper;Ventral (anterior) Arm 2 days                    Lab, Imaging and other studies: I have personally reviewed pertinent reports.

## 2024-01-26 NOTE — PLAN OF CARE
Problem: Potential for Falls  Goal: Patient will remain free of falls  Description: INTERVENTIONS:  - Educate patient/family on patient safety including physical limitations  - Instruct patient to call for assistance with activity   - Consult OT/PT to assist with strengthening/mobility   - Keep Call bell within reach  - Keep bed low and locked with side rails adjusted as appropriate  - Keep care items and personal belongings within reach  - Initiate and maintain comfort rounds  - Make Fall Risk Sign visible to staff  - Offer Toileting every 2 Hours, in advance of need  - Initiate/Maintain bed/chair alarm  - Obtain necessary fall risk management equipment  - Apply yellow socks and bracelet for high fall risk patients  - Consider moving patient to room near nurses station  Outcome: Progressing     Problem: INFECTION - ADULT  Goal: Absence or prevention of progression during hospitalization  Description: INTERVENTIONS:  - Assess and monitor for signs and symptoms of infection  - Monitor lab/diagnostic results  - Monitor all insertion sites, i.e. indwelling lines, tubes, and drains  - Monitor endotracheal if appropriate and nasal secretions for changes in amount and color  - [place] appropriate cooling/warming therapies per order  - Administer medications as ordered  - Instruct and encourage patient and family to use good hand hygiene technique  - Identify and instruct in appropriate isolation precautions for identified infection/condition  Outcome: Progressing     Problem: SAFETY ADULT  Goal: Patient will remain free of falls  Description: INTERVENTIONS:  - Educate patient/family on patient safety including physical limitations  - Instruct patient to call for assistance with activity   - Consult OT/PT to assist with strengthening/mobility   - Keep Call bell within reach  - Keep bed low and locked with side rails adjusted as appropriate  - Keep care items and personal belongings within reach  - Initiate and maintain  comfort rounds  - Make Fall Risk Sign visible to staff  - Offer Toileting every 2 Hours, in advance of need  - Initiate/Maintain bed/chair alarm  - Obtain necessary fall risk management equipment  - Apply yellow socks and bracelet for high fall risk patients  - Consider moving patient to room near nurses station  Outcome: Progressing

## 2024-01-26 NOTE — ASSESSMENT & PLAN NOTE
S/P partial thyroidectomy  Not maintained on levothyroxine - levels have been stable  Follows with [facility] - in remission

## 2024-01-26 NOTE — ASSESSMENT & PLAN NOTE
MRI consistent with discitis/osteomyelitis of L1-2, L4-5, L5-S1 regions.  Neurosurgery consulted  Per ER discussion with neurosurgery, obtain blood cultures and hold off on antibiotics pending results unless patient clinically declines  WBC 10k this AM  Consider transfer if patient becomes unstable/develops red flag symptoms however exam remains stable  Discussed with ID - recommend IR consult for possible biopsy  Earliest will be Tuesday, 1/30 as pt was on ASA and needs to be held for 5 days  Blood cultures negative x2 after 24 hrs

## 2024-01-26 NOTE — TELEPHONE ENCOUNTER
1/26/24 - PT AT [facility]  2/23/24 4 [facility]P F/U W/XR PER MARY Ramos PA-C  P [facility]surgical [place] [facility]  Hi,  Can we please schedule this patient for an approximate 4-week hospital follow-up with repeat upright x-rays?  Appointment to be scheduled with an AP solo.  Thank you,  Mary

## 2024-01-26 NOTE — OCCUPATIONAL THERAPY NOTE
Occupational Therapy Evaluation     Patient Name: Juan San  Today's Date: 1/26/2024  Problem List  Principal Problem:    Discitis of lumbosacral region  Active Problems:    Cirrhosis, alcoholic (HCC)    DM (diabetes mellitus) (HCC)    Herniated nucleus pulposus of lumbosacral region    Thyroid cancer (HCC)    Benign prostatic hyperplasia with urinary retention    Hypertension    Past Medical History  Past Medical History:   Diagnosis Date    Anxiety     Arthritis     Cancer (HCC)     Depression     Diabetes mellitus (HCC)     Hypertension     Liver disease     Mitral valve prolapse     Peripheral neuropathy     Neuropathy    PONV (postoperative nausea and vomiting)     Thyroid disease      Past Surgical History  Past Surgical History:   Procedure Laterality Date    COLONOSCOPY      INCISION AND DRAINAGE OF WOUND Left 08/12/2022    Procedure: INCISION AND DRAINAGE (I&D) EXTREMITY;  Surgeon: Moustapha Cote DPM;  Location:  MAIN OR;  Service: Podiatry    JOINT REPLACEMENT Left 03/11/2022    Left TSA    [place] ARTHRODESIS POSTERIOR/PSTLAT TQ 1NTRSPC LUMBAR Bilateral 04/11/2023    Procedure: L1-S1 navigated posterior decompression with instrumented fixation fusion;  Surgeon: James Moraes MD;  Location:  MAIN OR;  Service: Neurosurgery    THYROID SURGERY  2021    remove cancer         01/26/24 0939   OT Last Visit   OT Visit Date 01/26/24   Note Type   Note type Evaluation   Pain Assessment   Pain Assessment Tool FLACC   Pain Score No Pain   Pain Rating: FLACC (Rest) - Face 2   Pain Rating: FLACC (Rest) - Legs 2   Pain Rating: FLACC (Rest) - Activity 1   Pain Rating: FLACC (Rest) - Cry 1   Pain Rating: FLACC (Rest) - Consolability 2   Score: FLACC (Rest) 8   Pain Rating: FLACC (Activity) - Face 1   Pain Rating: FLACC (Activity) - Legs 1   Pain Rating: FLACC (Activity) - Activity 1   Pain Rating: FLACC (Activity) - Cry 1   Pain Rating: FLACC (Activity) - Consolability 1   Score: FLACC (Activity) 5    Restrictions/Precautions   Weight Bearing Precautions Per Order No   Other Precautions Spinal precautions;Fall Risk;Pain   Home Living   Type of Home House   Home Layout Stairs to enter with rails   Bathroom Accessibility Accessible   Home Equipment Walker;Cane;Reacher;Long-handled shoehorn   Additional Comments Pt does not like LHAD, wife assists (I)   Prior Function   Level of [place] Independent with functional mobility;Needs assistance with ADLs;Needs assistance with IADLS   Lives With Spouse   Receives Help From Family;Home health  (Pt reports having home health being setup)   IADLs Family/Friend/Other provides transportation;Family/Friend/Other provides meals;Family/Friend/Other provides medication management   Falls in the last 6 months >10   Lifestyle   Autonomy pta pt was (A) c ADLs and IADLs, wife is away during the day   Reciprocal Relationships wife   Intrinsic Gratification pt did not identify   General   Additional Pertinent History Hx of T12-S1 posterior instrumented fixation fusion, L1-2 and L4-5 bilateral laminectomies and total facetectomies, L2-3 and L3-4 bilateral laminectomies and medial facetectomies, L5-S1 bilateral laminal forminotomies on 4/11/2023. Hx of thyroid cancer, hypertension   Family/Caregiver Present No   Additional General Comments Pt was pleasant and cooperative   ADL   Where Assessed Edge of bed   Eating Assistance 6  Modified independent   Grooming Assistance 4  Minimal Assistance   UB Bathing Assistance 4  Minimal Assistance   LB Bathing Assistance 2  Maximal Assistance   UB Dressing Assistance 5  Supervision/Setup   UB Dressing Deficit Thread RUE;Thread LUE;Increased time to complete;Pull around back;Fasteners   LB Dressing Assistance 2  Maximal Assistance   LB Dressing Deficit Don/doff R sock;Don/doff L sock   Toileting Assistance  4  Minimal Assistance   Bed Mobility   Rolling R 5  Supervision   Additional items HOB elevated;Increased time required   Supine to Sit 5   Supervision   Additional items Increased time required   Sit to Supine 5  Supervision   Additional items Increased time required;HOB elevated   Transfers   Sit to Stand 5  Supervision   Additional items Increased time required   Stand to Sit 5  Supervision   Additional items Increased time required   Functional Mobility   Functional Mobility 5  Supervision   Additional Comments short household distance completed   Additional items Rolling walker   Balance   Static Sitting Normal   Dynamic Sitting Good   Static Standing Fair   Dynamic Standing Fair   Ambulatory Fair   Activity Tolerance   Activity Tolerance Patient limited by pain   Nurse Made Aware RN aware   RUE Assessment   RUE Assessment WFL   LUE Assessment   LUE Assessment WFL   Psychosocial   Psychosocial (WDL) WDL   Cognition   Overall Cognitive Status WFL   Arousal/Participation Alert;Cooperative   Attention Within functional limits   Orientation Level Oriented X4   Memory Within functional limits   Following Commands Follows one step commands without difficulty   Assessment   Limitation Decreased ADL status;Decreased Safe judgement during ADL;Decreased endurance;Decreased high-level ADLs;Decreased self-care trans   Prognosis Good   Assessment Pt is a 68 y.o. male seen for OT evaluation s/p admission to [facility] on 1/24/[facility]24 due to back pain. Diagnosed with Discitis of lumbosacral region. Personal and env factors supporting pt at time of IE include age, supportive wife, and (A) with all ADLs. Personal and env factors inhibiting engagement in occupations include difficulty completing ADLs and difficulty completing IADLs. Performance deficits that affect the pt’s occupational performance can be seen above. Due to pt's current functional limitations and medical complications pt is functioning below baseline. Pt would benefit from continued skilled OT treatment in order to maximize safety, independence and overall performance with ADLs, functional mobility, and  functional transfers in order to achieve highest level of function.   Goals   Patient Goals to get stronger   LTG Time Frame 3-7   Long Term Goal GOALS    1. Pt will improve activity tolerance to G for min 30 min txment sessions for increase engagement in functional tasks    2. Pt will complete bed mobility at a Mod I level w/ G balance/safety demonstrated to decrease caregiver assistance required     3. Pt will complete UB dressing/self care w/ mod I using adaptive device and DME as needed     4. Pt will complete LB dressing/self care w/ mod I using adaptive device and DME as needed    5. Pt will complete toileting w/ mod I w/ G hygiene/thoroughness using DME as needed    6. Pt will improve functional transfers to Mod I on/off all surfaces using DME as needed w/ G balance/safety     7. Pt will improve functional mobility during ADL/IADL/leisure tasks to Mod I using DME as needed w/ G balance/safety   Plan   Treatment Interventions ADL retraining;Functional transfer training;Endurance training;Patient/family training;Equipment evaluation/education;Compensatory technique education;Continued evaluation;Activityengagement;Energy conservation   Goal Expiration Date 02/02/24   OT Treatment Day 0   OT Frequency 2-3x/wk   Discharge Recommendation   Rehab Resource Intensity Level, OT III (Minimum Resource Intensity)   AM-PAC Daily Activity Inpatient   Lower Body Dressing 2   Bathing 3   Toileting 3   Upper Body Dressing 3   Grooming 3   Eating 4   Daily Activity Raw Score 18   Daily Activity Standardized Score (Calc for Raw Score >=11) 38.66   AM-PAC Applied Cognition Inpatient   Following a Speech/Presentation 4   Understanding Ordinary Conversation 4   Taking Medications 4   Remembering Where Things Are Placed or Put Away 3   Remembering List of 4-5 Errands 3   Taking Care of Complicated Tasks 3   Applied Cognition Raw Score 21   Applied Cognition Standardized Score 44.3   End of Consult   Education Provided Yes   Patient  Position at End of Consult Supine;Bed/Chair alarm activated;All needs within reach   Nurse Communication Nurse aware of consult       The patient's raw score on the AM-PAC Daily Activity Inpatient Short Form is 18. A raw score of less than 19 suggests the patient may benefit from discharge to home. Please refer to the recommendation of the Occupational Therapist for safe discharge planning.    Carmenza Medrano, OTR/L

## 2024-01-26 NOTE — UTILIZATION REVIEW
NOTIFICATION OF INPATIENT ADMISSION   AUTHORIZATION REQUEST   SERVICING FACILITY:   [place] [address]  Tax ID: [id number]  NPI: [id number] ATTENDING PROVIDER:  Attending Name and NPI#: Tiffany Cobb Md [[id number]]  Address: [address] [place] [address]  Phone: [phone]   ADMISSION INFORMATION:  Place of Service: Inpatient [facility]  Place of Service Code: 21  Inpatient Admission Date/Time: 1/24/24  2:33 PM  Discharge Date/Time: No discharge date for patient encounter.  Admitting Diagnosis Code/Description:  Discitis [M46.40]  Osteomyelitis (HCC) [M86.9]  Pain [R52]  Acute exacerbation of chronic low back pain [M54.50, G89.29]     UTILIZATION REVIEW CONTACT:  Leah Cast Utilization   Network Utilization Review Department  Phone: [phone]  Fax [phone]  Email: [email or web address]  Contact for approvals/pending authorizations, clinical reviews, and discharge.     PHYSICIAN ADVISORY SERVICES:  Medical Necessity Denial & Hlfc-xp-Ovhm Review  Phone: [phone]  Fax: [phone]  Email: [email or web address]     DISCHARGE SUPPORT TEAM:  For Patients Discharge Needs & Updates  Phone: [phone] opt. 2 Fax: [phone]  Email: [email or web address]

## 2024-01-26 NOTE — PLAN OF CARE
Problem: PHYSICAL THERAPY ADULT  Goal: Performs mobility at highest level of function for planned discharge setting.  See evaluation for individualized goals.  Description: Treatment/Interventions: LE strengthening/ROM, Functional transfer training, Elevations, Therapeutic exercise, Endurance training, Patient/family training, Equipment eval/education, Bed mobility, Gait training, Spoke to nursing, OT          See flowsheet documentation for full assessment, interventions and recommendations.  Note: Prognosis: Guarded  Problem List: Decreased strength, Decreased endurance, Impaired balance, Decreased mobility, Impaired sensation, Orthopedic restrictions, Pain  Assessment: Patient is a 69y/o M with discitis/osteomyelitis of L1-2, L4-5, L5-S1 regions. Patient resides with spouse in an apartment with steps to enter. He is independent with mobility and has assistance with ADL's/IADL's. Per patient, he is never alone and has caregivers when spouse is working. He uses a SPC and RW at baseline. Current medical status includes pain, spinal precautions, LSO, fall risk, bed/chair alarm, decreased sensation, strength, balance, endurance and mobility. Patient was fitted with an LSO and education was provided. Patient was also educated on spinal precautions as he did not appear to maintain then when sitting edge of bed. Also needed verbal cues for log roll technique to get in/out of bed. Patient was at a supervision level for bed mobility, transfers, and amb. Increased time to complete due to pain. Heavy reliance on RW. Patient will benefit from level 3 resources. Low intensity. Will need a stair trial as well. The patient's AM-PAC Basic Mobility Inpatient Short Form Raw Score is 19. A Raw score of greater than 17 suggests the patient may benefit from discharge to home. Please also refer to the recommendation of the Physical Therapist for safe discharge planning.  Barriers to Discharge: Inaccessible home environment  Barriers  to Discharge Comments: Will need a stair trial. Will have assistance to get in/out of apartment  Rehab Resource Intensity Level, PT: III (Minimum Resource Intensity)    See flowsheet documentation for full assessment.

## 2024-01-26 NOTE — ASSESSMENT & PLAN NOTE
Continued on 120 mg nifedipine every morning, Hyzaar, metoprolol  Elevated BP on presentation likely multifactorial in setting of pain, missed doses of home antihypertensives  Now stable

## 2024-01-26 NOTE — ASSESSMENT & PLAN NOTE
Lab Results   Component Value Date    HGBA1C 8.2 (H) 01/24/2024       Recent Labs     01/25/24  1613 01/25/24  2113 01/26/24  0800 01/26/24  1135   POCGLU 196* 142* 162* 232*         Blood Sugar Average: Last 72 hrs:  (P) 179.375  Maintained on metformin and jardiance outpatient.   Hold while admitted, can resume on discharge  SSI with meals while admitted

## 2024-01-27 LAB
ANION GAP SERPL CALCULATED.3IONS-SCNC: 10 MMOL/L
BASOPHILS # BLD AUTO: 0.07 THOUSANDS/ÂΜL (ref 0–0.1)
BASOPHILS NFR BLD AUTO: 1 % (ref 0–1)
BUN SERPL-MCNC: 16 MG/DL (ref 5–25)
CALCIUM SERPL-MCNC: 9.8 MG/DL (ref 8.4–10.2)
CHLORIDE SERPL-SCNC: 94 MMOL/L (ref 96–108)
CO2 SERPL-SCNC: 31 MMOL/L (ref 21–32)
CREAT SERPL-MCNC: 0.76 MG/DL (ref 0.6–1.3)
EOSINOPHIL # BLD AUTO: 0.36 THOUSAND/ÂΜL (ref 0–0.61)
EOSINOPHIL NFR BLD AUTO: 4 % (ref 0–6)
ERYTHROCYTE [DISTWIDTH] IN BLOOD BY AUTOMATED COUNT: 17.2 % (ref 11.6–15.1)
GFR SERPL CREATININE-BSD FRML MDRD: 95 ML/MIN/1.73SQ M
GLUCOSE SERPL-MCNC: 109 MG/DL (ref 65–140)
GLUCOSE SERPL-MCNC: 147 MG/DL (ref 65–140)
GLUCOSE SERPL-MCNC: 147 MG/DL (ref 65–140)
GLUCOSE SERPL-MCNC: 182 MG/DL (ref 65–140)
GLUCOSE SERPL-MCNC: 270 MG/DL (ref 65–140)
HCT VFR BLD AUTO: 39.7 % (ref 36.5–49.3)
HGB BLD-MCNC: 12.1 G/DL (ref 12–17)
IMM GRANULOCYTES # BLD AUTO: 0.04 THOUSAND/UL (ref 0–0.2)
IMM GRANULOCYTES NFR BLD AUTO: 0 % (ref 0–2)
INR PPP: 0.94 (ref 0.84–1.19)
LYMPHOCYTES # BLD AUTO: 2.13 THOUSANDS/ÂΜL (ref 0.6–4.47)
LYMPHOCYTES NFR BLD AUTO: 22 % (ref 14–44)
MCH RBC QN AUTO: 24.8 PG (ref 26.8–34.3)
MCHC RBC AUTO-ENTMCNC: 30.5 G/DL (ref 31.4–37.4)
MCV RBC AUTO: 82 FL (ref 82–98)
MONOCYTES # BLD AUTO: 0.76 THOUSAND/ÂΜL (ref 0.17–1.22)
MONOCYTES NFR BLD AUTO: 8 % (ref 4–12)
NEUTROPHILS # BLD AUTO: 6.52 THOUSANDS/ÂΜL (ref 1.85–7.62)
NEUTS SEG NFR BLD AUTO: 65 % (ref 43–75)
NRBC BLD AUTO-RTO: 0 /100 WBCS
PLATELET # BLD AUTO: 381 THOUSANDS/UL (ref 149–390)
PMV BLD AUTO: 9.7 FL (ref 8.9–12.7)
POTASSIUM SERPL-SCNC: 4 MMOL/L (ref 3.5–5.3)
PROTHROMBIN TIME: 13 SECONDS (ref 11.6–14.5)
RBC # BLD AUTO: 4.87 MILLION/UL (ref 3.88–5.62)
SODIUM SERPL-SCNC: 135 MMOL/L (ref 135–147)
WBC # BLD AUTO: 9.88 THOUSAND/UL (ref 4.31–10.16)

## 2024-01-27 PROCEDURE — 85610 PROTHROMBIN TIME: CPT | Performed by: RADIOLOGY

## 2024-01-27 PROCEDURE — 80048 BASIC METABOLIC PNL TOTAL CA: CPT | Performed by: PHYSICIAN ASSISTANT

## 2024-01-27 PROCEDURE — 82948 REAGENT STRIP/BLOOD GLUCOSE: CPT

## 2024-01-27 PROCEDURE — 99232 SBSQ HOSP IP/OBS MODERATE 35: CPT | Performed by: PHYSICIAN ASSISTANT

## 2024-01-27 PROCEDURE — 85025 COMPLETE CBC W/AUTO DIFF WBC: CPT | Performed by: PHYSICIAN ASSISTANT

## 2024-01-27 RX ADMIN — HYDROCHLOROTHIAZIDE 25 MG: 25 TABLET ORAL at 08:30

## 2024-01-27 RX ADMIN — MIRTAZAPINE 45 MG: 15 TABLET, FILM COATED ORAL at 21:22

## 2024-01-27 RX ADMIN — SERTRALINE HYDROCHLORIDE 100 MG: 100 TABLET ORAL at 08:33

## 2024-01-27 RX ADMIN — NIFEDIPINE 120 MG: 30 TABLET, EXTENDED RELEASE ORAL at 08:32

## 2024-01-27 RX ADMIN — PRAVASTATIN SODIUM 20 MG: 20 TABLET ORAL at 17:49

## 2024-01-27 RX ADMIN — HEPARIN SODIUM 5000 UNITS: 5000 INJECTION INTRAVENOUS; SUBCUTANEOUS at 13:29

## 2024-01-27 RX ADMIN — ACETAMINOPHEN 325MG 650 MG: 325 TABLET ORAL at 14:56

## 2024-01-27 RX ADMIN — HEPARIN SODIUM 5000 UNITS: 5000 INJECTION INTRAVENOUS; SUBCUTANEOUS at 06:41

## 2024-01-27 RX ADMIN — LIDOCAINE 5% 1 PATCH: 700 PATCH TOPICAL at 08:34

## 2024-01-27 RX ADMIN — TAMSULOSIN HYDROCHLORIDE 0.4 MG: 0.4 CAPSULE ORAL at 17:48

## 2024-01-27 RX ADMIN — METOPROLOL SUCCINATE 75 MG: 50 TABLET, EXTENDED RELEASE ORAL at 08:29

## 2024-01-27 RX ADMIN — LOSARTAN POTASSIUM 100 MG: 50 TABLET, FILM COATED ORAL at 08:33

## 2024-01-27 RX ADMIN — FINASTERIDE 5 MG: 5 TABLET, FILM COATED ORAL at 08:30

## 2024-01-27 RX ADMIN — MORPHINE SULFATE 4 MG: 4 INJECTION, SOLUTION INTRAMUSCULAR; INTRAVENOUS at 08:33

## 2024-01-27 RX ADMIN — HEPARIN SODIUM 5000 UNITS: 5000 INJECTION INTRAVENOUS; SUBCUTANEOUS at 21:22

## 2024-01-27 RX ADMIN — GABAPENTIN 400 MG: 400 CAPSULE ORAL at 08:32

## 2024-01-27 RX ADMIN — INSULIN LISPRO 4 UNITS: 100 INJECTION, SOLUTION INTRAVENOUS; SUBCUTANEOUS at 12:01

## 2024-01-27 RX ADMIN — CLONAZEPAM 1 MG: 1 TABLET ORAL at 04:00

## 2024-01-27 RX ADMIN — OXYCODONE HYDROCHLORIDE 10 MG: 10 TABLET ORAL at 21:23

## 2024-01-27 RX ADMIN — METHOCARBAMOL TABLETS 750 MG: 500 TABLET, COATED ORAL at 14:53

## 2024-01-27 RX ADMIN — MORPHINE SULFATE 4 MG: 4 INJECTION, SOLUTION INTRAMUSCULAR; INTRAVENOUS at 00:47

## 2024-01-27 RX ADMIN — ACETAMINOPHEN 325MG 650 MG: 325 TABLET ORAL at 21:22

## 2024-01-27 RX ADMIN — FOLIC ACID 2000 MCG: 1 TABLET ORAL at 08:33

## 2024-01-27 RX ADMIN — CLONAZEPAM 1 MG: 1 TABLET ORAL at 18:08

## 2024-01-27 RX ADMIN — GABAPENTIN 400 MG: 400 CAPSULE ORAL at 17:48

## 2024-01-27 RX ADMIN — FLUTICASONE PROPIONATE 1 SPRAY: 50 SPRAY, METERED NASAL at 10:05

## 2024-01-27 RX ADMIN — Medication 3 MG: at 21:22

## 2024-01-27 RX ADMIN — INSULIN LISPRO 1 UNITS: 100 INJECTION, SOLUTION INTRAVENOUS; SUBCUTANEOUS at 21:27

## 2024-01-27 NOTE — PROGRESS NOTES
[facility]  Progress Note  Name: Juan San I  MRN: [id number]  Unit/Bed#: -[facility]idel I Date of Admission: 1/24/2024   Date of Service: 1/27/2024 I Hospital Day: 3    Assessment/Plan   * Discitis of lumbosacral region  Assessment & Plan  MRI consistent with discitis/osteomyelitis of L1-2, L4-5, L5-S1 regions.  Neurosurgery consulted  Per ER discussion with neurosurgery, obtain blood cultures and hold off on antibiotics pending results unless patient clinically declines  WBC WNL, afebrile  Consider transfer if patient becomes unstable/develops red flag symptoms however exam remains stable  Discussed with ID - recommend IR consult for possible biopsy  Earliest will be Tuesday, 1/30 as pt was on ASA and needs to be held for 5 days  Blood cultures negative x2 after 48 hrs    Hypertension  Assessment & Plan  Continued on 120 mg nifedipine every morning, Hyzaar, metoprolol  Elevated BP on presentation likely multifactorial in setting of pain, missed doses of home antihypertensives  Now stable    Benign prostatic hyperplasia with urinary retention  Assessment & Plan  Continue finasteride and tamsulosin  Urinary retention protocol    Thyroid cancer (HCC)  Assessment & Plan  S/P partial thyroidectomy  Not maintained on levothyroxine - levels have been stable  Follows with [facility] - in remission    Herniated nucleus pulposus of lumbosacral region  Assessment & Plan  S/P surgical intervention April 2023    DM (diabetes mellitus) (HCC)  Assessment & Plan  Lab Results   Component Value Date    HGBA1C 8.2 (H) 01/24/2024       Recent Labs     01/26/24  1135 01/26/24  1617 01/26/24 2036 01/27/24  0737   POCGLU 232* 137 135 147*         Blood Sugar Average: Last 72 hrs:  (P) 168.5623920723743959  Maintained on metformin and jardiance outpatient.   Hold while admitted, can resume on discharge  SSI with meals while admitted    Cirrhosis, alcoholic (HCC)  Assessment & Plan  Continue outpatient  follow-up  Follows with PCP         VTE Pharmacologic Prophylaxis: VTE Score: 3 Moderate Risk (Score 3-4) - Pharmacological DVT Prophylaxis Ordered: heparin.    Mobility:   Basic Mobility Inpatient Raw Score: 23  JH-HLM Goal: 7: Walk 25 feet or more  JH-HLM Achieved: 6: Walk 10 steps or more  HLM Goal NOT achieved. Continue with multidisciplinary rounding and encourage appropriate mobility to improve upon HLM goals.    Patient Centered Rounds: I performed bedside rounds with nursing staff today.   Discussions with Specialists or Other Care Team Provider:     Education and Discussions with Family / Patient: Patient declined call to .     Total Time Spent on Date of Encounter in care of patient: 35 mins. This time was spent on one or more of the following: performing physical exam; counseling and coordination of care; obtaining or reviewing history; documenting in the medical record; reviewing/ordering tests, medications or procedures; communicating with other healthcare professionals and discussing with patient's family/caregivers.    Current Length of Stay: 3 day(s)  Current Patient Status: Inpatient   Certification Statement: The patient will continue to require additional inpatient hospital stay due to plan for spinal biopsy on   Discharge Plan: Anticipate discharge in >72 hrs to home with home services.    Code Status: Level 1 - Full Code    Subjective:   Patient feels better today, back pain is tolerable.  Denies chest pain/palpitations, shortness of breath, nausea/vomiting, abdominal pain, lower extremity weakness, saddle anesthesia, bowel or bladder incontinence.    Objective:     Vitals:   Temp (24hrs), Av.2 °F (36.8 °C), Min:98.1 °F (36.7 °C), Max:98.4 °F (36.9 °C)    Temp:  [98.1 °F (36.7 °C)-98.4 °F (36.9 °C)] 98.4 °F (36.9 °C)  HR:  [] 104  Resp:  [18] 18  BP: (130-140)/(70-84) 140/84  SpO2:  [93 %-99 %] 97 %  Body mass index is 26.58 kg/m².     Input and Output Summary (last  24 hours):     Intake/Output Summary (Last 24 hours) at 1/27/2024 1004  Last data filed at 1/26/2024 2103  Gross per 24 hour   Intake 360 ml   Output --   Net 360 ml       Physical Exam:   Physical Exam  Vitals and nursing note reviewed.   Constitutional:       General: He is not in acute distress.     Appearance: He is well-developed.      Comments: No acute distress   HENT:      Head: Normocephalic and atraumatic.   Eyes:      General: No scleral icterus.     Extraocular Movements: Extraocular movements intact.      Conjunctiva/sclera: Conjunctivae normal.   Cardiovascular:      Rate and Rhythm: Normal rate and regular rhythm.      Heart sounds: S1 normal and S2 normal. No murmur heard.  Pulmonary:      Effort: Pulmonary effort is normal. No respiratory distress.      Breath sounds: Normal breath sounds. No wheezing, rhonchi or rales.   Abdominal:      General: Bowel sounds are normal.      Palpations: Abdomen is soft.      Tenderness: There is no abdominal tenderness. There is no guarding or rebound.   Musculoskeletal:         General: No swelling.      Cervical back: Normal range of motion.      Comments: Able to move upper/lower extremities bilaterally without difficulty, strength 5/5 throughout.  No edema   Skin:     General: Skin is warm and dry.      Capillary Refill: Capillary refill takes less than 2 seconds.   Neurological:      Mental Status: He is alert and oriented to person, place, and time.   Psychiatric:         Mood and Affect: Mood normal.         Speech: Speech normal.         Behavior: Behavior normal.          Additional Data:     Labs:  Results from last 7 days   Lab Units 01/27/24  0031   WBC Thousand/uL 9.88   HEMOGLOBIN g/dL 12.1   HEMATOCRIT % 39.7   PLATELETS Thousands/uL 381   NEUTROS PCT % 65   LYMPHS PCT % 22   MONOS PCT % 8   EOS PCT % 4     Results from last 7 days   Lab Units 01/27/24  0031 01/25/24  0520 01/24/24  0545   SODIUM mmol/L 135   < > 137   POTASSIUM mmol/L 4.0   < > 3.7    CHLORIDE mmol/L 94*   < > 97   CO2 mmol/L 31   < > 33*   BUN mg/dL 16   < > 13   CREATININE mg/dL 0.76   < > 0.75   ANION GAP mmol/L 10   < > 7   CALCIUM mg/dL 9.8   < > 9.2   ALBUMIN g/dL  --   --  3.9   TOTAL BILIRUBIN mg/dL  --   --  0.22   ALK PHOS U/L  --   --  71   ALT U/L  --   --  13   AST U/L  --   --  16   GLUCOSE RANDOM mg/dL 109   < > 195*    < > = values in this interval not displayed.     Results from last 7 days   Lab Units 01/27/24  0031   INR  0.94     Results from last 7 days   Lab Units 01/27/24  0737 01/26/24  2036 01/26/24  1617 01/26/24  1135 01/26/24  0800 01/25/24  2113 01/25/24  1613 01/25/24  1058 01/25/24  0750 01/24/24  2112 01/24/24  1552   POC GLUCOSE mg/dl 147* 135 137 232* 162* 142* 196* 296* 155* 123 129     Results from last 7 days   Lab Units 01/24/24  1519   HEMOGLOBIN A1C % 8.2*     Results from last 7 days   Lab Units 01/24/24  1516   LACTIC ACID mmol/L 1.6       Lines/Drains:  Invasive Devices       Peripheral Intravenous Line  Duration             Peripheral IV 01/24/24 Distal;Right;Upper;Ventral (anterior) Arm 3 days                          Imaging: No pertinent imaging reviewed.    Recent Cultures (last 7 days):   Results from last 7 days   Lab Units 01/24/24  1516   BLOOD CULTURE  No Growth at 48 hrs.  No Growth at 48 hrs.       Last 24 Hours Medication List:   Current Facility-Administered Medications   Medication Dose Route Frequency Provider Last Rate    acetaminophen  650 mg Oral Q6H PRN Sveta Smith PA-C      aluminum-magnesium hydroxide-simethicone  30 mL Oral Q6H PRN Sveta Smith PA-C      clonazePAM  1 mg Oral BID PRN Leatha Anguiano DO      finasteride  5 mg Oral Daily Sveta Smith PA-C      fluticasone  1 spray Nasal Daily PRN Sveta Smith PA-C      folic acid  2,000 mcg Oral Daily Sveta Luis, PA-C      gabapentin  400 mg Oral BID Sveta Smith PA-C      heparin (porcine)  5,000 Units Subcutaneous Q8H [facility] Sveta Smith PA-C      losartan  100 mg Oral Daily  Adelitangoc Marina, DO      And    hydroCHLOROthiazide  25 mg Oral Daily Adelita Marina, DO      insulin lispro  1-6 Units Subcutaneous TID AC Sveta Smith, DANIEL      insulin lispro  1-6 Units Subcutaneous HS Sveta Smith, DANIEL      lidocaine  1 patch Topical Daily Sveta Smith, DANIEL      melatonin  3 mg Oral HS Sveta Smith, DANIEL      methocarbamol  750 mg Oral Q8H PRN Sveta Smith, DANIEL      metoprolol succinate  75 mg Oral QAM Sveta Smith, DANIEL      mirtazapine  45 mg Oral HS Sveta Smith, DANIEL      morphine injection  4 mg Intravenous Q4H PRN Sveta Smith, DANIEL      NIFEdipine  120 mg Oral Daily Adelita Marina DO      ondansetron  4 mg Intravenous Q6H PRN Sveta Smith, DANIEL      oxyCODONE  10 mg Oral Q4H PRN Sveta Smith, DANIEL      oxyCODONE  5 mg Oral Q4H PRN Sveta Smith, DANIEL      pravastatin  20 mg Oral Daily With Dinner Sveta Smith PA-C      sertraline  100 mg Oral Daily Adelita Marina DO      tamsulosin  0.4 mg Oral Daily With Dinner Sveta Smith, DANIEL          Today, Patient Was Seen By: Sintia Soto PA-C    **Please Note: This note may have been constructed using a voice recognition system.**

## 2024-01-27 NOTE — PLAN OF CARE
Problem: Potential for Falls  Goal: Patient will remain free of falls  Description: INTERVENTIONS:  - Educate patient/family on patient safety including physical limitations  - Instruct patient to call for assistance with activity   - Consult OT/PT to assist with strengthening/mobility   - Keep Call bell within reach  - Keep bed low and locked with side rails adjusted as appropriate  - Keep care items and personal belongings within reach  - Initiate and maintain comfort rounds  - Make Fall Risk Sign visible to staff  - Offer Toileting every x Hours, in advance of need  - Initiate/Maintain xalarm  - Obtain necessary fall risk management equipment: x  - Apply yellow socks and bracelet for high fall risk patients  - Consider moving patient to room near nurses station  Outcome: Progressing     Problem: PAIN - ADULT  Goal: Verbalizes/displays adequate comfort level or baseline comfort level  Description: Interventions:  - Encourage patient to monitor pain and request assistance  - Assess pain using appropriate pain scale  - Administer analgesics based on type and severity of pain and evaluate response  - Implement non-pharmacological measures as appropriate and evaluate response  - Consider cultural and social influences on pain and pain management  - Notify physician/advanced practitioner if interventions unsuccessful or patient reports new pain  Outcome: Progressing     Problem: INFECTION - ADULT  Goal: Absence or prevention of progression during hospitalization  Description: INTERVENTIONS:  - Assess and monitor for signs and symptoms of infection  - Monitor lab/diagnostic results  - Monitor all insertion sites, i.e. indwelling lines, tubes, and drains  - Monitor endotracheal if appropriate and nasal secretions for changes in amount and color  - [place] appropriate cooling/warming therapies per order  - Administer medications as ordered  - Instruct and encourage patient and family to use good hand hygiene  technique  - Identify and instruct in appropriate isolation precautions for identified infection/condition  Outcome: Progressing  Goal: Absence of fever/infection during neutropenic period  Description: INTERVENTIONS:  - Monitor WBC    Outcome: Progressing     Problem: SAFETY ADULT  Goal: Patient will remain free of falls  Description: INTERVENTIONS:  - Educate patient/family on patient safety including physical limitations  - Instruct patient to call for assistance with activity   - Consult OT/PT to assist with strengthening/mobility   - Keep Call bell within reach  - Keep bed low and locked with side rails adjusted as appropriate  - Keep care items and personal belongings within reach  - Initiate and maintain comfort rounds  - Make Fall Risk Sign visible to staff  - Offer Toileting every x Hours, in advance of need  - Initiate/Maintain xalarm  - Obtain necessary fall risk management equipment: x  - Apply yellow socks and bracelet for high fall risk patients  - Consider moving patient to room near nurses station  Outcome: Progressing  Goal: Maintain or return to baseline ADL function  Description: INTERVENTIONS:  -  Assess patient's ability to carry out ADLs; assess patient's baseline for ADL function and identify physical deficits which impact ability to perform ADLs (bathing, care of mouth/teeth, toileting, grooming, dressing, etc.)  - Assess/evaluate cause of self-care deficits   - Assess range of motion  - Assess patient's mobility; develop plan if impaired  - Assess patient's need for assistive devices and provide as appropriate  - Encourage maximum independence but intervene and supervise when necessary  - Involve family in performance of ADLs  - Assess for home care needs following discharge   - Consider OT consult to assist with ADL evaluation and planning for discharge  - Provide patient education as appropriate  Outcome: Progressing  Goal: Maintains/Returns to pre admission functional level  Description:  INTERVENTIONS:  - Perform AM-PAC 6 Click Basic Mobility/ Daily Activity assessment daily.  - Set and communicate daily mobility goal to care team and patient/family/caregiver.   - Collaborate with rehabilitation services on mobility goals if consulted  - Perform Range of Motion x times a day.  - Reposition patient every x hours.  - Dangle patient x times a day  - Stand patient x times a day  - Ambulate patient x times a day  - Out of bed to chair x times a day   - Out of bed for meals xxxx times a day  - Out of bed for toileting  - Record patient progress and toleration of activity level   Outcome: Progressing     Problem: DISCHARGE PLANNING  Goal: Discharge to home or other facility with appropriate resources  Description: INTERVENTIONS:  - Identify barriers to discharge w/patient and caregiver  - Arrange for needed discharge resources and transportation as appropriate  - Identify discharge learning needs (meds, wound care, etc.)  - Arrange for interpretive services to assist at discharge as needed  - Refer to Case Management Department for coordinating discharge planning if the patient needs post-hospital services based on physician/advanced practitioner order or complex needs related to functional status, cognitive ability, or social support system  Outcome: Progressing     Problem: Knowledge Deficit  Goal: Patient/family/caregiver demonstrates understanding of disease process, treatment plan, medications, and discharge instructions  Description: Complete learning assessment and assess knowledge base.  Interventions:  - Provide teaching at level of understanding  - Provide teaching via preferred learning methods  Outcome: Progressing     Problem: MUSCULOSKELETAL - ADULT  Goal: Maintain or return mobility to safest level of function  Description: INTERVENTIONS:  - Assess patient's ability to carry out ADLs; assess patient's baseline for ADL function and identify physical deficits which impact ability to perform  ADLs (bathing, care of mouth/teeth, toileting, grooming, dressing, etc.)  - Assess/evaluate cause of self-care deficits   - Assess range of motion  - Assess patient's mobility  - Assess patient's need for assistive devices and provide as appropriate  - Encourage maximum independence but intervene and supervise when necessary  - Involve family in performance of ADLs  - Assess for home care needs following discharge   - Consider OT consult to assist with ADL evaluation and planning for discharge  - Provide patient education as appropriate  Outcome: Progressing  Goal: Maintain proper alignment of affected body part  Description: INTERVENTIONS:  - Support, maintain and protect limb and body alignment  - Provide patient/ family with appropriate education  Outcome: Progressing

## 2024-01-27 NOTE — ASSESSMENT & PLAN NOTE
S/P partial thyroidectomy  Not maintained on levothyroxine - levels have been stable  Follows with [facility] - in remission

## 2024-01-27 NOTE — ASSESSMENT & PLAN NOTE
MRI consistent with discitis/osteomyelitis of L1-2, L4-5, L5-S1 regions.  Neurosurgery consulted  Per ER discussion with neurosurgery, obtain blood cultures and hold off on antibiotics pending results unless patient clinically declines  WBC WNL, afebrile  Consider transfer if patient becomes unstable/develops red flag symptoms however exam remains stable  Discussed with ID - recommend IR consult for possible biopsy  Earliest will be Tuesday, 1/30 as pt was on ASA and needs to be held for 5 days  Blood cultures negative x2 after 48 hrs

## 2024-01-27 NOTE — ASSESSMENT & PLAN NOTE
Lab Results   Component Value Date    HGBA1C 8.2 (H) 01/24/2024       Recent Labs     01/26/24  1135 01/26/24  1617 01/26/24 2036 01/27/24  0737   POCGLU 232* 137 135 147*         Blood Sugar Average: Last 72 hrs:  (P) 168.1029685854355909  Maintained on metformin and jardiance outpatient.   Hold while admitted, can resume on discharge  SSI with meals while admitted

## 2024-01-27 NOTE — PLAN OF CARE
Problem: PAIN - ADULT  Goal: Verbalizes/displays adequate comfort level or baseline comfort level  Description: Interventions:  - Encourage patient to monitor pain and request assistance  - Assess pain using appropriate pain scale  - Administer analgesics based on type and severity of pain and evaluate response  - Implement non-pharmacological measures as appropriate and evaluate response  - Consider cultural and social influences on pain and pain management  - Notify physician/advanced practitioner if interventions unsuccessful or patient reports new pain  Outcome: Progressing     Problem: INFECTION - ADULT  Goal: Absence or prevention of progression during hospitalization  Description: INTERVENTIONS:  - Assess and monitor for signs and symptoms of infection  - Monitor lab/diagnostic results  - Monitor all insertion sites, i.e. indwelling lines, tubes, and drains  - Monitor endotracheal if appropriate and nasal secretions for changes in amount and color  - [place] appropriate cooling/warming therapies per order  - Administer medications as ordered  - Instruct and encourage patient and family to use good hand hygiene technique  - Identify and instruct in appropriate isolation precautions for identified infection/condition  Outcome: Progressing     Problem: MUSCULOSKELETAL - ADULT  Goal: Maintain or return mobility to safest level of function  Description: INTERVENTIONS:  - Assess patient's ability to carry out ADLs; assess patient's baseline for ADL function and identify physical deficits which impact ability to perform ADLs (bathing, care of mouth/teeth, toileting, grooming, dressing, etc.)  - Assess/evaluate cause of self-care deficits   - Assess range of motion  - Assess patient's mobility  - Assess patient's need for assistive devices and provide as appropriate  - Encourage maximum independence but intervene and supervise when necessary  - Involve family in performance of ADLs  - Assess for home care needs  following discharge   - Consider OT consult to assist with ADL evaluation and planning for discharge  - Provide patient education as appropriate  Outcome: Progressing     Problem: Knowledge Deficit  Goal: Patient/family/caregiver demonstrates understanding of disease process, treatment plan, medications, and discharge instructions  Description: Complete learning assessment and assess knowledge base.  Interventions:  - Provide teaching at level of understanding  - Provide teaching via preferred learning methods  Outcome: Progressing     Problem: DISCHARGE PLANNING  Goal: Discharge to home or other facility with appropriate resources  Description: INTERVENTIONS:  - Identify barriers to discharge w/patient and caregiver  - Arrange for needed discharge resources and transportation as appropriate  - Identify discharge learning needs (meds, wound care, etc.)  - Arrange for interpretive services to assist at discharge as needed  - Refer to Case Management Department for coordinating discharge planning if the patient needs post-hospital services based on physician/advanced practitioner order or complex needs related to functional status, cognitive ability, or social support system  Outcome: Progressing

## 2024-01-28 LAB
ANION GAP SERPL CALCULATED.3IONS-SCNC: 10 MMOL/L
BASOPHILS # BLD AUTO: 0.06 THOUSANDS/ÂΜL (ref 0–0.1)
BASOPHILS NFR BLD AUTO: 1 % (ref 0–1)
BUN SERPL-MCNC: 21 MG/DL (ref 5–25)
CALCIUM SERPL-MCNC: 9.9 MG/DL (ref 8.4–10.2)
CHLORIDE SERPL-SCNC: 94 MMOL/L (ref 96–108)
CO2 SERPL-SCNC: 31 MMOL/L (ref 21–32)
CREAT SERPL-MCNC: 0.83 MG/DL (ref 0.6–1.3)
EOSINOPHIL # BLD AUTO: 0.39 THOUSAND/ÂΜL (ref 0–0.61)
EOSINOPHIL NFR BLD AUTO: 4 % (ref 0–6)
ERYTHROCYTE [DISTWIDTH] IN BLOOD BY AUTOMATED COUNT: 17.1 % (ref 11.6–15.1)
GFR SERPL CREATININE-BSD FRML MDRD: 91 ML/MIN/1.73SQ M
GLUCOSE SERPL-MCNC: 114 MG/DL (ref 65–140)
GLUCOSE SERPL-MCNC: 130 MG/DL (ref 65–140)
GLUCOSE SERPL-MCNC: 177 MG/DL (ref 65–140)
GLUCOSE SERPL-MCNC: 190 MG/DL (ref 65–140)
GLUCOSE SERPL-MCNC: 203 MG/DL (ref 65–140)
HCT VFR BLD AUTO: 40.2 % (ref 36.5–49.3)
HGB BLD-MCNC: 12.2 G/DL (ref 12–17)
IMM GRANULOCYTES # BLD AUTO: 0.03 THOUSAND/UL (ref 0–0.2)
IMM GRANULOCYTES NFR BLD AUTO: 0 % (ref 0–2)
LYMPHOCYTES # BLD AUTO: 2.16 THOUSANDS/ÂΜL (ref 0.6–4.47)
LYMPHOCYTES NFR BLD AUTO: 24 % (ref 14–44)
MCH RBC QN AUTO: 25 PG (ref 26.8–34.3)
MCHC RBC AUTO-ENTMCNC: 30.3 G/DL (ref 31.4–37.4)
MCV RBC AUTO: 82 FL (ref 82–98)
MONOCYTES # BLD AUTO: 0.7 THOUSAND/ÂΜL (ref 0.17–1.22)
MONOCYTES NFR BLD AUTO: 8 % (ref 4–12)
NEUTROPHILS # BLD AUTO: 5.61 THOUSANDS/ÂΜL (ref 1.85–7.62)
NEUTS SEG NFR BLD AUTO: 63 % (ref 43–75)
NRBC BLD AUTO-RTO: 0 /100 WBCS
PLATELET # BLD AUTO: 419 THOUSANDS/UL (ref 149–390)
PMV BLD AUTO: 9.7 FL (ref 8.9–12.7)
POTASSIUM SERPL-SCNC: 3.7 MMOL/L (ref 3.5–5.3)
RBC # BLD AUTO: 4.88 MILLION/UL (ref 3.88–5.62)
SODIUM SERPL-SCNC: 135 MMOL/L (ref 135–147)
WBC # BLD AUTO: 8.95 THOUSAND/UL (ref 4.31–10.16)

## 2024-01-28 PROCEDURE — 99232 SBSQ HOSP IP/OBS MODERATE 35: CPT | Performed by: INTERNAL MEDICINE

## 2024-01-28 PROCEDURE — 80048 BASIC METABOLIC PNL TOTAL CA: CPT | Performed by: INTERNAL MEDICINE

## 2024-01-28 PROCEDURE — 82948 REAGENT STRIP/BLOOD GLUCOSE: CPT

## 2024-01-28 PROCEDURE — 85025 COMPLETE CBC W/AUTO DIFF WBC: CPT | Performed by: INTERNAL MEDICINE

## 2024-01-28 RX ORDER — CLONAZEPAM 0.5 MG/1
0.5 TABLET ORAL DAILY PRN
Status: DISCONTINUED | OUTPATIENT
Start: 2024-01-28 | End: 2024-02-06 | Stop reason: HOSPADM

## 2024-01-28 RX ORDER — CLONAZEPAM 1 MG/1
1 TABLET ORAL 2 TIMES DAILY
Status: DISCONTINUED | OUTPATIENT
Start: 2024-01-28 | End: 2024-02-06 | Stop reason: HOSPADM

## 2024-01-28 RX ADMIN — FINASTERIDE 5 MG: 5 TABLET, FILM COATED ORAL at 08:17

## 2024-01-28 RX ADMIN — HEPARIN SODIUM 5000 UNITS: 5000 INJECTION INTRAVENOUS; SUBCUTANEOUS at 05:42

## 2024-01-28 RX ADMIN — Medication 3 MG: at 21:26

## 2024-01-28 RX ADMIN — METHOCARBAMOL TABLETS 750 MG: 500 TABLET, COATED ORAL at 21:56

## 2024-01-28 RX ADMIN — OXYCODONE HYDROCHLORIDE 5 MG: 5 TABLET ORAL at 10:47

## 2024-01-28 RX ADMIN — CLONAZEPAM 1 MG: 1 TABLET ORAL at 10:47

## 2024-01-28 RX ADMIN — ACETAMINOPHEN 325MG 650 MG: 325 TABLET ORAL at 06:02

## 2024-01-28 RX ADMIN — PRAVASTATIN SODIUM 20 MG: 20 TABLET ORAL at 15:30

## 2024-01-28 RX ADMIN — SERTRALINE HYDROCHLORIDE 100 MG: 100 TABLET ORAL at 08:17

## 2024-01-28 RX ADMIN — INSULIN LISPRO 2 UNITS: 100 INJECTION, SOLUTION INTRAVENOUS; SUBCUTANEOUS at 11:52

## 2024-01-28 RX ADMIN — NIFEDIPINE 120 MG: 30 TABLET, EXTENDED RELEASE ORAL at 08:17

## 2024-01-28 RX ADMIN — OXYCODONE HYDROCHLORIDE 10 MG: 10 TABLET ORAL at 21:55

## 2024-01-28 RX ADMIN — CLONAZEPAM 1 MG: 1 TABLET ORAL at 18:13

## 2024-01-28 RX ADMIN — TAMSULOSIN HYDROCHLORIDE 0.4 MG: 0.4 CAPSULE ORAL at 15:30

## 2024-01-28 RX ADMIN — MIRTAZAPINE 45 MG: 15 TABLET, FILM COATED ORAL at 21:26

## 2024-01-28 RX ADMIN — METOPROLOL SUCCINATE 75 MG: 50 TABLET, EXTENDED RELEASE ORAL at 08:17

## 2024-01-28 RX ADMIN — FOLIC ACID 2000 MCG: 1 TABLET ORAL at 08:17

## 2024-01-28 RX ADMIN — MORPHINE SULFATE 4 MG: 4 INJECTION, SOLUTION INTRAMUSCULAR; INTRAVENOUS at 00:25

## 2024-01-28 RX ADMIN — INSULIN LISPRO 1 UNITS: 100 INJECTION, SOLUTION INTRAVENOUS; SUBCUTANEOUS at 16:45

## 2024-01-28 RX ADMIN — GABAPENTIN 400 MG: 400 CAPSULE ORAL at 18:13

## 2024-01-28 RX ADMIN — INSULIN LISPRO 2 UNITS: 100 INJECTION, SOLUTION INTRAVENOUS; SUBCUTANEOUS at 08:16

## 2024-01-28 RX ADMIN — OXYCODONE HYDROCHLORIDE 10 MG: 10 TABLET ORAL at 03:26

## 2024-01-28 RX ADMIN — LIDOCAINE 5% 1 PATCH: 700 PATCH TOPICAL at 08:16

## 2024-01-28 RX ADMIN — LOSARTAN POTASSIUM 100 MG: 50 TABLET, FILM COATED ORAL at 08:16

## 2024-01-28 RX ADMIN — HEPARIN SODIUM 5000 UNITS: 5000 INJECTION INTRAVENOUS; SUBCUTANEOUS at 21:26

## 2024-01-28 RX ADMIN — GABAPENTIN 400 MG: 400 CAPSULE ORAL at 08:17

## 2024-01-28 RX ADMIN — MORPHINE SULFATE 4 MG: 4 INJECTION, SOLUTION INTRAMUSCULAR; INTRAVENOUS at 14:23

## 2024-01-28 RX ADMIN — HYDROCHLOROTHIAZIDE 25 MG: 25 TABLET ORAL at 08:17

## 2024-01-28 NOTE — PLAN OF CARE
Problem: Potential for Falls  Goal: Patient will remain free of falls  Description: INTERVENTIONS:  - Educate patient/family on patient safety including physical limitations  - Instruct patient to call for assistance with activity   - Consult OT/PT to assist with strengthening/mobility   - Keep Call bell within reach  - Keep bed low and locked with side rails adjusted as appropriate  - Keep care items and personal belongings within reach  - Initiate and maintain comfort rounds  - Make Fall Risk Sign visible to staff  - Offer Toileting every 2 Hours, in advance of need  - Initiate/Maintain alarm  - Obtain necessary fall risk management equipment: yellow socks  - Apply yellow socks and bracelet for high fall risk patients  - Consider moving patient to room near nurses station  Outcome: Progressing     Problem: PAIN - ADULT  Goal: Verbalizes/displays adequate comfort level or baseline comfort level  Description: Interventions:  - Encourage patient to monitor pain and request assistance  - Assess pain using appropriate pain scale  - Administer analgesics based on type and severity of pain and evaluate response  - Implement non-pharmacological measures as appropriate and evaluate response  - Consider cultural and social influences on pain and pain management  - Notify physician/advanced practitioner if interventions unsuccessful or patient reports new pain  Outcome: Progressing     Problem: INFECTION - ADULT  Goal: Absence or prevention of progression during hospitalization  Description: INTERVENTIONS:  - Assess and monitor for signs and symptoms of infection  - Monitor lab/diagnostic results  - Monitor all insertion sites, i.e. indwelling lines, tubes, and drains  - Monitor endotracheal if appropriate and nasal secretions for changes in amount and color  - [place] appropriate cooling/warming therapies per order  - Administer medications as ordered  - Instruct and encourage patient and family to use good hand hygiene  technique  - Identify and instruct in appropriate isolation precautions for identified infection/condition  Outcome: Progressing  Goal: Absence of fever/infection during neutropenic period  Description: INTERVENTIONS:  - Monitor WBC    Outcome: Progressing     Problem: SAFETY ADULT  Goal: Patient will remain free of falls  Description: INTERVENTIONS:  - Educate patient/family on patient safety including physical limitations  - Instruct patient to call for assistance with activity   - Consult OT/PT to assist with strengthening/mobility   - Keep Call bell within reach  - Keep bed low and locked with side rails adjusted as appropriate  - Keep care items and personal belongings within reach  - Initiate and maintain comfort rounds  - Make Fall Risk Sign visible to staff  - Offer Toileting every  Hours, in advance of need  - Initiate/Maintain alarm  - Obtain necessary fall risk management equipment:   - Apply yellow socks and bracelet for high fall risk patients  - Consider moving patient to room near nurses station  Outcome: Progressing  Goal: Maintain or return to baseline ADL function  Description: INTERVENTIONS:  -  Assess patient's ability to carry out ADLs; assess patient's baseline for ADL function and identify physical deficits which impact ability to perform ADLs (bathing, care of mouth/teeth, toileting, grooming, dressing, etc.)  - Assess/evaluate cause of self-care deficits   - Assess range of motion  - Assess patient's mobility; develop plan if impaired  - Assess patient's need for assistive devices and provide as appropriate  - Encourage maximum independence but intervene and supervise when necessary  - Involve family in performance of ADLs  - Assess for home care needs following discharge   - Consider OT consult to assist with ADL evaluation and planning for discharge  - Provide patient education as appropriate  Outcome: Progressing  Goal: Maintains/Returns to pre admission functional level  Description:  INTERVENTIONS:  - Perform AM-PAC 6 Click Basic Mobility/ Daily Activity assessment daily.  - Set and communicate daily mobility goal to care team and patient/family/caregiver.   - Collaborate with rehabilitation services on mobility goals if consulted  - Perform Range of Motion  times a day.  - Reposition patient every  hours.  - Dangle patient  times a day  - Stand patient  times a day  - Ambulate patient  times a day  - Out of bed to chair  times a day   - Out of bed for meals  times a day  - Out of bed for toileting  - Record patient progress and toleration of activity level   Outcome: Progressing     Problem: DISCHARGE PLANNING  Goal: Discharge to home or other facility with appropriate resources  Description: INTERVENTIONS:  - Identify barriers to discharge w/patient and caregiver  - Arrange for needed discharge resources and transportation as appropriate  - Identify discharge learning needs (meds, wound care, etc.)  - Arrange for interpretive services to assist at discharge as needed  - Refer to Case Management Department for coordinating discharge planning if the patient needs post-hospital services based on physician/advanced practitioner order or complex needs related to functional status, cognitive ability, or social support system  Outcome: Progressing     Problem: Knowledge Deficit  Goal: Patient/family/caregiver demonstrates understanding of disease process, treatment plan, medications, and discharge instructions  Description: Complete learning assessment and assess knowledge base.  Interventions:  - Provide teaching at level of understanding  - Provide teaching via preferred learning methods  Outcome: Progressing     Problem: MUSCULOSKELETAL - ADULT  Goal: Maintain or return mobility to safest level of function  Description: INTERVENTIONS:  - Assess patient's ability to carry out ADLs; assess patient's baseline for ADL function and identify physical deficits which impact ability to perform ADLs  (bathing, care of mouth/teeth, toileting, grooming, dressing, etc.)  - Assess/evaluate cause of self-care deficits   - Assess range of motion  - Assess patient's mobility  - Assess patient's need for assistive devices and provide as appropriate  - Encourage maximum independence but intervene and supervise when necessary  - Involve family in performance of ADLs  - Assess for home care needs following discharge   - Consider OT consult to assist with ADL evaluation and planning for discharge  - Provide patient education as appropriate  Outcome: Progressing  Goal: Maintain proper alignment of affected body part  Description: INTERVENTIONS:  - Support, maintain and protect limb and body alignment  - Provide patient/ family with appropriate education  Outcome: Progressing

## 2024-01-28 NOTE — PLAN OF CARE
Problem: Potential for Falls  Goal: Patient will remain free of falls  Description: INTERVENTIONS:  - Educate patient/family on patient safety including physical limitations  - Instruct patient to call for assistance with activity   - Consult OT/PT to assist with strengthening/mobility   - Keep Call bell within reach  - Keep bed low and locked with side rails adjusted as appropriate  - Keep care items and personal belongings within reach  - Initiate and maintain comfort rounds  - Make Fall Risk Sign visible to staff  - Offer Toileting every 2 Hours, in advance of need  - Initiate/Maintain call bell alarm  - Obtain necessary fall risk management equipment: call bell usage  - Apply yellow socks and bracelet for high fall risk patients  - Consider moving patient to room near nurses station  Outcome: Progressing     Problem: PAIN - ADULT  Goal: Verbalizes/displays adequate comfort level or baseline comfort level  Description: Interventions:  - Encourage patient to monitor pain and request assistance  - Assess pain using appropriate pain scale  - Administer analgesics based on type and severity of pain and evaluate response  - Implement non-pharmacological measures as appropriate and evaluate response  - Consider cultural and social influences on pain and pain management  - Notify physician/advanced practitioner if interventions unsuccessful or patient reports new pain  Outcome: Progressing     Problem: INFECTION - ADULT  Goal: Absence or prevention of progression during hospitalization  Description: INTERVENTIONS:  - Assess and monitor for signs and symptoms of infection  - Monitor lab/diagnostic results  - Monitor all insertion sites, i.e. indwelling lines, tubes, and drains  - Monitor endotracheal if appropriate and nasal secretions for changes in amount and color  - [place] appropriate cooling/warming therapies per order  - Administer medications as ordered  - Instruct and encourage patient and family to use good  hand hygiene technique  - Identify and instruct in appropriate isolation precautions for identified infection/condition  Outcome: Progressing  Goal: Absence of fever/infection during neutropenic period  Description: INTERVENTIONS:  - Monitor WBC    Outcome: Progressing

## 2024-01-28 NOTE — ASSESSMENT & PLAN NOTE
Lab Results   Component Value Date    HGBA1C 8.2 (H) 01/24/2024       Recent Labs     01/27/24  1719 01/27/24  2035 01/28/24  0730 01/28/24  1059   POCGLU 147* 182* 190* 203*       Blood Sugar Average: Last 72 hrs:  (P) 185.2052400462558747  Maintained on metformin and jardiance outpatient.   Hold while admitted, can resume on discharge  SSI with meals while admitted  Current Accu-Cheks and hypoglycemia protocol

## 2024-01-28 NOTE — PROGRESS NOTES
"[facility]  Progress Note  Name: Juan San I  MRN: [id number]  Unit/Bed#: -Fidel I Date of Admission: 1/24/2024   Date of Service: 1/28/2024 I Hospital Day: 4    Assessment/Plan   * Discitis of lumbosacral region  Assessment & Plan  MRI consistent with discitis/osteomyelitis of L1-2, L4-5, L5-S1 regions.  Neurosurgery consulted  Per ER discussion with neurosurgery, obtain blood cultures and hold off on antibiotics pending results unless patient clinically declines  WBC WNL, afebrile  Consider transfer if patient becomes unstable/develops red flag symptoms however exam remains stable  Discussed with ID - recommend IR consult for possible biopsy  Earliest will be Tuesday, 1/30 as pt was on ASA and needs to be held for 5 days  Blood cultures negative x2 negative so far    Hypertension  Assessment & Plan  Continued on 120 mg nifedipine every morning, Hyzaar, metoprolol    /83   Pulse 81   Temp 98.2 °F (36.8 °C)   Resp 20   Ht 5' 9\" (1.753 m)   Wt 81.6 kg (180 lb)   SpO2 98%   BMI 26.58 kg/m²      Benign prostatic hyperplasia with urinary retention  Assessment & Plan  Continue finasteride and tamsulosin  Urinary retention protocol    Thyroid cancer (HCC)  Assessment & Plan  S/P partial thyroidectomy  Not maintained on levothyroxine - levels have been stable  Follows with [facility] - in remission    Herniated nucleus pulposus of lumbosacral region  Assessment & Plan  S/P surgical intervention April 2023    DM (diabetes mellitus) (HCC)  Assessment & Plan  Lab Results   Component Value Date    HGBA1C 8.2 (H) 01/24/2024       Recent Labs     01/27/24  1719 01/27/24  2035 01/28/24  0730 01/28/24  1059   POCGLU 147* 182* 190* 203*       Blood Sugar Average: Last 72 hrs:  (P) [id number]  Maintained on metformin and jardiance outpatient.   Hold while admitted, can resume on discharge  SSI with meals while admitted  Current Accu-Cheks and hypoglycemia " protocol      Cirrhosis, alcoholic (HCC)  Assessment & Plan  Continue outpatient follow-up  Follows with PCP               VTE Pharmacologic Prophylaxis: VTE Score: 3 Moderate Risk (Score 3-4) - Pharmacological DVT Prophylaxis Ordered: heparin.    Mobility:   Basic Mobility Inpatient Raw Score: 24  JH-HLM Goal: 8: Walk 250 feet or more  JH-HLM Achieved: 7: Walk 25 feet or more  HLM Goal achieved. Continue to encourage appropriate mobility.    Patient Centered Rounds: I performed bedside rounds with nursing staff today.   Discussions with Specialists or Other Care Team Provider: yes    Education and Discussions with Family / Patient: Patient declined call to .     Total Time Spent on Date of Encounter in care of patient: 36 mins. This time was spent on one or more of the following: performing physical exam; counseling and coordination of care; obtaining or reviewing history; documenting in the medical record; reviewing/ordering tests, medications or procedures; communicating with other healthcare professionals and discussing with patient's family/caregivers.    Current Length of Stay: 4 day(s)  Current Patient Status: Inpatient   Certification Statement: The patient will continue to require additional inpatient hospital stay due to pending evaluation  Discharge Plan:  to be determined     Code Status: Level 1 - Full Code    Subjective:   Seen and examined at bedside denied chest pain or shortness of breath  Stable on room air  Rest of ROS negative    Objective:     Vitals:   Temp (24hrs), Av.1 °F (36.7 °C), Min:97.6 °F (36.4 °C), Max:98.2 °F (36.8 °C)    Temp:  [97.6 °F (36.4 °C)-98.2 °F (36.8 °C)] 98.2 °F (36.8 °C)  HR:  [75-81] 81  Resp:  [20] 20  BP: (146-159)/(67-94) 159/83  SpO2:  [96 %-98 %] 98 %  Body mass index is 26.58 kg/m².     Input and Output Summary (last 24 hours):     Intake/Output Summary (Last 24 hours) at 2024 1216  Last data filed at 2024 1301  Gross per 24 hour    Intake 120 ml   Output --   Net 120 ml       Physical Exam:   Physical Exam  Vitals reviewed.   Constitutional:       Appearance: Normal appearance.   HENT:      Head: Atraumatic.      Mouth/Throat:      Mouth: Mucous membranes are moist.   Eyes:      General: No scleral icterus.  Cardiovascular:      Rate and Rhythm: Normal rate and regular rhythm.      Heart sounds: Normal heart sounds.   Abdominal:      General: Bowel sounds are normal.   Musculoskeletal:         General: Tenderness present.      Cervical back: Neck supple.      Right lower leg: No edema.      Left lower leg: No edema.   Skin:     Capillary Refill: Capillary refill takes less than 2 seconds.   Neurological:      Mental Status: He is alert and oriented to person, place, and time. Mental status is at baseline.          Additional Data:     Labs:  Results from last 7 days   Lab Units 01/28/24  0548   WBC Thousand/uL 8.95   HEMOGLOBIN g/dL 12.2   HEMATOCRIT % 40.2   PLATELETS Thousands/uL 419*   NEUTROS PCT % 63   LYMPHS PCT % 24   MONOS PCT % 8   EOS PCT % 4     Results from last 7 days   Lab Units 01/28/24  0548 01/25/24  0520 01/24/24  0545   SODIUM mmol/L 135   < > 137   POTASSIUM mmol/L 3.7   < > 3.7   CHLORIDE mmol/L 94*   < > 97   CO2 mmol/L 31   < > 33*   BUN mg/dL 21   < > 13   CREATININE mg/dL 0.83   < > 0.75   ANION GAP mmol/L 10   < > 7   CALCIUM mg/dL 9.9   < > 9.2   ALBUMIN g/dL  --   --  3.9   TOTAL BILIRUBIN mg/dL  --   --  0.22   ALK PHOS U/L  --   --  71   ALT U/L  --   --  13   AST U/L  --   --  16   GLUCOSE RANDOM mg/dL 130   < > 195*    < > = values in this interval not displayed.     Results from last 7 days   Lab Units 01/27/24  0031   INR  0.94     Results from last 7 days   Lab Units 01/28/24  1059 01/28/24  0730 01/27/24  2035 01/27/24  1719 01/27/24  1102 01/27/24  0737 01/26/24  2036 01/26/24  1617 01/26/24  1135 01/26/24  0800 01/25/24  2113 01/25/24  1613   POC GLUCOSE mg/dl 203* 190* 182* 147* 270* 147* 135 137 232*  162* 142* 196*     Results from last 7 days   Lab Units 01/24/24  1519   HEMOGLOBIN A1C % 8.2*     Results from last 7 days   Lab Units 01/24/24  1516   LACTIC ACID mmol/L 1.6       Lines/Drains:  Invasive Devices       Peripheral Intravenous Line  Duration             Peripheral IV 01/24/24 Distal;Right;Upper;Ventral (anterior) Arm 4 days                          Imaging: No pertinent imaging reviewed.    Recent Cultures (last 7 days):   Results from last 7 days   Lab Units 01/24/24  1516   BLOOD CULTURE  No Growth at 72 hrs.  No Growth at 72 hrs.       Last 24 Hours Medication List:   Current Facility-Administered Medications   Medication Dose Route Frequency Provider Last Rate    acetaminophen  650 mg Oral Q6H PRN Sveta Smith PA-C      aluminum-magnesium hydroxide-simethicone  30 mL Oral Q6H PRN Sveta Smith PA-C      clonazePAM  0.5 mg Oral Daily PRN Tiffany Cobb MD      clonazePAM  1 mg Oral BID Tiffany Cobb MD      finasteride  5 mg Oral Daily Sveta Smith PA-C      fluticasone  1 spray Nasal Daily PRN Sveta Smith PA-C      folic acid  2,000 mcg Oral Daily Sveta Smith PA-C      gabapentin  400 mg Oral BID Sveta Smith PA-C      heparin (porcine)  5,000 Units Subcutaneous Q8H CHRISTIE Sveta Smith PA-C      losartan  100 mg Oral Daily Adelita Marina, DO      And    hydroCHLOROthiazide  25 mg Oral Daily Adelita Marina, DO      insulin lispro  1-6 Units Subcutaneous TID AC Sveta Smith PA-C      insulin lispro  1-6 Units Subcutaneous HS Sveta Smith PA-C      lidocaine  1 patch Topical Daily Sveta Smith PA-C      melatonin  3 mg Oral HS Sveta Smith PA-C      methocarbamol  750 mg Oral Q8H PRN Sveta Smith PA-C      metoprolol succinate  75 mg Oral QAM Sveta Smith PA-C      mirtazapine  45 mg Oral HS Sveta Smith PA-C      morphine injection  4 mg Intravenous Q4H PRN Sveta Smith PA-C      NIFEdipine  120 mg Oral Daily Adelita Marina, DO      ondansetron  4 mg Intravenous Q6H PRN Sveta Smith PA-C      oxyCODONE  10 mg Oral Q4H PRN Sveta  DANIEL Smith      oxyCODONE  5 mg Oral Q4H PRN Sveta Smith PA-C      pravastatin  20 mg Oral Daily With Dinner Sveta Smith PA-C      sertraline  100 mg Oral Daily Adelita Marina DO      tamsulosin  0.4 mg Oral Daily With Dinner Sveta Smith PA-C          Today, Patient Was Seen By: Tiffany Cobb MD    **Please Note: This note may have been constructed using a voice recognition system.**

## 2024-01-28 NOTE — NURSING NOTE
Attempt to place new IV site failed  d/t patient flinching and pulling arm away. Per pt unable to keep still because of pain. Pt refusing labs at this time, requesting them to be done later. Will relay request to oncoming nurse.

## 2024-01-28 NOTE — ASSESSMENT & PLAN NOTE
S/P partial thyroidectomy  Not maintained on levothyroxine - levels have been stable  Follows with [facility] - in remission

## 2024-01-28 NOTE — ASSESSMENT & PLAN NOTE
MRI consistent with discitis/osteomyelitis of L1-2, L4-5, L5-S1 regions.  Neurosurgery consulted  Per ER discussion with neurosurgery, obtain blood cultures and hold off on antibiotics pending results unless patient clinically declines  WBC WNL, afebrile  Consider transfer if patient becomes unstable/develops red flag symptoms however exam remains stable  Discussed with ID - recommend IR consult for possible biopsy  Earliest will be Tuesday, 1/30 as pt was on ASA and needs to be held for 5 days  Blood cultures negative x2 negative so far

## 2024-01-28 NOTE — ASSESSMENT & PLAN NOTE
"Continued on 120 mg nifedipine every morning, Hyzaar, metoprolol    /83   Pulse 81   Temp 98.2 °F (36.8 °C)   Resp 20   Ht 5' 9\" (1.753 m)   Wt 81.6 kg (180 lb)   SpO2 98%   BMI 26.58 kg/m²    "

## 2024-01-29 PROBLEM — M48.061 FORAMINAL STENOSIS OF LUMBAR REGION: Status: ACTIVE | Noted: 2024-01-29

## 2024-01-29 LAB
ANION GAP SERPL CALCULATED.3IONS-SCNC: 10 MMOL/L
BACTERIA BLD CULT: NORMAL
BACTERIA BLD CULT: NORMAL
BASOPHILS # BLD AUTO: 0.09 THOUSANDS/ÂΜL (ref 0–0.1)
BASOPHILS NFR BLD AUTO: 1 % (ref 0–1)
BUN SERPL-MCNC: 20 MG/DL (ref 5–25)
CALCIUM SERPL-MCNC: 10.2 MG/DL (ref 8.4–10.2)
CHLORIDE SERPL-SCNC: 94 MMOL/L (ref 96–108)
CO2 SERPL-SCNC: 29 MMOL/L (ref 21–32)
CREAT SERPL-MCNC: 0.95 MG/DL (ref 0.6–1.3)
EOSINOPHIL # BLD AUTO: 0.41 THOUSAND/ÂΜL (ref 0–0.61)
EOSINOPHIL NFR BLD AUTO: 5 % (ref 0–6)
ERYTHROCYTE [DISTWIDTH] IN BLOOD BY AUTOMATED COUNT: 16.9 % (ref 11.6–15.1)
GFR SERPL CREATININE-BSD FRML MDRD: 83 ML/MIN/1.73SQ M
GLUCOSE SERPL-MCNC: 143 MG/DL (ref 65–140)
GLUCOSE SERPL-MCNC: 195 MG/DL (ref 65–140)
GLUCOSE SERPL-MCNC: 197 MG/DL (ref 65–140)
GLUCOSE SERPL-MCNC: 200 MG/DL (ref 65–140)
GLUCOSE SERPL-MCNC: 214 MG/DL (ref 65–140)
HCT VFR BLD AUTO: 37.6 % (ref 36.5–49.3)
HGB BLD-MCNC: 11.4 G/DL (ref 12–17)
IMM GRANULOCYTES # BLD AUTO: 0.03 THOUSAND/UL (ref 0–0.2)
IMM GRANULOCYTES NFR BLD AUTO: 0 % (ref 0–2)
LYMPHOCYTES # BLD AUTO: 2.21 THOUSANDS/ÂΜL (ref 0.6–4.47)
LYMPHOCYTES NFR BLD AUTO: 27 % (ref 14–44)
MCH RBC QN AUTO: 24.8 PG (ref 26.8–34.3)
MCHC RBC AUTO-ENTMCNC: 30.3 G/DL (ref 31.4–37.4)
MCV RBC AUTO: 82 FL (ref 82–98)
MONOCYTES # BLD AUTO: 0.66 THOUSAND/ÂΜL (ref 0.17–1.22)
MONOCYTES NFR BLD AUTO: 8 % (ref 4–12)
NEUTROPHILS # BLD AUTO: 4.66 THOUSANDS/ÂΜL (ref 1.85–7.62)
NEUTS SEG NFR BLD AUTO: 59 % (ref 43–75)
NRBC BLD AUTO-RTO: 0 /100 WBCS
PLATELET # BLD AUTO: 379 THOUSANDS/UL (ref 149–390)
PMV BLD AUTO: 9.7 FL (ref 8.9–12.7)
POTASSIUM SERPL-SCNC: 3.9 MMOL/L (ref 3.5–5.3)
RBC # BLD AUTO: 4.59 MILLION/UL (ref 3.88–5.62)
SODIUM SERPL-SCNC: 133 MMOL/L (ref 135–147)
WBC # BLD AUTO: 8.06 THOUSAND/UL (ref 4.31–10.16)

## 2024-01-29 PROCEDURE — 85025 COMPLETE CBC W/AUTO DIFF WBC: CPT | Performed by: INTERNAL MEDICINE

## 2024-01-29 PROCEDURE — 82948 REAGENT STRIP/BLOOD GLUCOSE: CPT

## 2024-01-29 PROCEDURE — 99232 SBSQ HOSP IP/OBS MODERATE 35: CPT | Performed by: PHYSICIAN ASSISTANT

## 2024-01-29 PROCEDURE — 80048 BASIC METABOLIC PNL TOTAL CA: CPT | Performed by: INTERNAL MEDICINE

## 2024-01-29 RX ORDER — HEPARIN SODIUM 5000 [USP'U]/ML
5000 INJECTION, SOLUTION INTRAVENOUS; SUBCUTANEOUS EVERY 8 HOURS SCHEDULED
Status: COMPLETED | OUTPATIENT
Start: 2024-01-29 | End: 2024-01-29

## 2024-01-29 RX ADMIN — OXYCODONE HYDROCHLORIDE 10 MG: 10 TABLET ORAL at 04:03

## 2024-01-29 RX ADMIN — TAMSULOSIN HYDROCHLORIDE 0.4 MG: 0.4 CAPSULE ORAL at 16:49

## 2024-01-29 RX ADMIN — MORPHINE SULFATE 4 MG: 4 INJECTION, SOLUTION INTRAMUSCULAR; INTRAVENOUS at 00:20

## 2024-01-29 RX ADMIN — Medication 3 MG: at 21:13

## 2024-01-29 RX ADMIN — GABAPENTIN 400 MG: 400 CAPSULE ORAL at 16:49

## 2024-01-29 RX ADMIN — HEPARIN SODIUM 5000 UNITS: 5000 INJECTION INTRAVENOUS; SUBCUTANEOUS at 12:47

## 2024-01-29 RX ADMIN — OXYCODONE HYDROCHLORIDE 10 MG: 10 TABLET ORAL at 16:49

## 2024-01-29 RX ADMIN — MORPHINE SULFATE 4 MG: 4 INJECTION, SOLUTION INTRAMUSCULAR; INTRAVENOUS at 18:06

## 2024-01-29 RX ADMIN — FOLIC ACID 2000 MCG: 1 TABLET ORAL at 08:03

## 2024-01-29 RX ADMIN — NIFEDIPINE 120 MG: 30 TABLET, EXTENDED RELEASE ORAL at 08:03

## 2024-01-29 RX ADMIN — LIDOCAINE 5% 1 PATCH: 700 PATCH TOPICAL at 08:02

## 2024-01-29 RX ADMIN — FINASTERIDE 5 MG: 5 TABLET, FILM COATED ORAL at 08:03

## 2024-01-29 RX ADMIN — INSULIN LISPRO 2 UNITS: 100 INJECTION, SOLUTION INTRAVENOUS; SUBCUTANEOUS at 08:03

## 2024-01-29 RX ADMIN — SERTRALINE HYDROCHLORIDE 100 MG: 100 TABLET ORAL at 08:03

## 2024-01-29 RX ADMIN — HEPARIN SODIUM 5000 UNITS: 5000 INJECTION INTRAVENOUS; SUBCUTANEOUS at 21:13

## 2024-01-29 RX ADMIN — LOSARTAN POTASSIUM 100 MG: 50 TABLET, FILM COATED ORAL at 08:03

## 2024-01-29 RX ADMIN — OXYCODONE HYDROCHLORIDE 10 MG: 10 TABLET ORAL at 08:03

## 2024-01-29 RX ADMIN — METOPROLOL SUCCINATE 75 MG: 50 TABLET, EXTENDED RELEASE ORAL at 08:03

## 2024-01-29 RX ADMIN — CLONAZEPAM 1 MG: 1 TABLET ORAL at 16:49

## 2024-01-29 RX ADMIN — INSULIN LISPRO 2 UNITS: 100 INJECTION, SOLUTION INTRAVENOUS; SUBCUTANEOUS at 21:13

## 2024-01-29 RX ADMIN — PRAVASTATIN SODIUM 20 MG: 20 TABLET ORAL at 16:49

## 2024-01-29 RX ADMIN — HEPARIN SODIUM 5000 UNITS: 5000 INJECTION INTRAVENOUS; SUBCUTANEOUS at 06:03

## 2024-01-29 RX ADMIN — INSULIN LISPRO 2 UNITS: 100 INJECTION, SOLUTION INTRAVENOUS; SUBCUTANEOUS at 12:48

## 2024-01-29 RX ADMIN — ACETAMINOPHEN 325MG 650 MG: 325 TABLET ORAL at 06:03

## 2024-01-29 RX ADMIN — GABAPENTIN 400 MG: 400 CAPSULE ORAL at 08:03

## 2024-01-29 RX ADMIN — OXYCODONE HYDROCHLORIDE 10 MG: 10 TABLET ORAL at 21:13

## 2024-01-29 RX ADMIN — OXYCODONE HYDROCHLORIDE 10 MG: 10 TABLET ORAL at 12:48

## 2024-01-29 RX ADMIN — MIRTAZAPINE 45 MG: 15 TABLET, FILM COATED ORAL at 21:13

## 2024-01-29 RX ADMIN — HYDROCHLOROTHIAZIDE 25 MG: 25 TABLET ORAL at 08:03

## 2024-01-29 RX ADMIN — CLONAZEPAM 1 MG: 1 TABLET ORAL at 08:03

## 2024-01-29 NOTE — PROGRESS NOTES
[facility]  Progress Note  Name: Juan San I  MRN: [id number]  Unit/Bed#: -01 I Date of Admission: 1/24/2024   Date of Service: 1/29/2024 I Hospital Day: 5    Assessment/Plan   * Discitis of lumbosacral region  Assessment & Plan  MRI consistent with discitis/osteomyelitis of L1-2, L4-5, L5-S1 regions.  Neurosurgery consulted  Per ER discussion with neurosurgery, obtain blood cultures and hold off on antibiotics pending results unless patient clinically declines  WBC WNL, afebrile  Consider transfer if patient becomes unstable/develops red flag symptoms however exam remains stable  Discussed with ID - recommend IR consult for possible biopsy  Earliest will be tomorrow 1/30 as pt was on ASA and needs to be held for 5 days  N.p.o. after midnight  Hold heparin after midnight  Blood cultures negative x2 negative so far    Hypertension  Assessment & Plan  Home regimen includes losartan/HCTZ, metoprolol, nifedipine  Blood pressure stable    Benign prostatic hyperplasia with urinary retention  Assessment & Plan  Continue finasteride and tamsulosin  Urinary retention protocol    Thyroid cancer (HCC)  Assessment & Plan  S/P partial thyroidectomy  Not maintained on levothyroxine - levels have been stable  Follows with [facility] - in remission    Herniated nucleus pulposus of lumbosacral region  Assessment & Plan  S/P surgical intervention April 2023    DM (diabetes mellitus) (HCC)  Assessment & Plan  Lab Results   Component Value Date    HGBA1C 8.2 (H) 01/24/2024       Recent Labs     01/28/24  1552 01/28/24  2040 01/29/24  0749 01/29/24  1058   POCGLU 177* 114 197* 195*       Blood Sugar Average: Last 72 hrs:  (P) [id number]  Maintained on metformin and jardiance outpatient.   Hold while admitted, can resume on discharge  SSI with meals while admitted  Current Accu-Cheks and hypoglycemia protocol      Cirrhosis, alcoholic (HCC)  Assessment & Plan  Continue outpatient  follow-up  Follows with PCP         VTE Pharmacologic Prophylaxis: VTE Score: 3 Moderate Risk (Score 3-4) - Pharmacological DVT Prophylaxis Ordered: heparin.    Mobility:   Basic Mobility Inpatient Raw Score: 24  JH-HLM Goal: 8: Walk 250 feet or more  JH-HLM Achieved: 7: Walk 25 feet or more  HLM Goal NOT achieved. Continue with multidisciplinary rounding and encourage appropriate mobility to improve upon HLM goals.    Patient Centered Rounds: I performed bedside rounds with nursing staff today.   Discussions with Specialists or Other Care Team Provider: YANN    Education and Discussions with Family / Patient: Patient declined call to .     Total Time Spent on Date of Encounter in care of patient: 35 mins. This time was spent on one or more of the following: performing physical exam; counseling and coordination of care; obtaining or reviewing history; documenting in the medical record; reviewing/ordering tests, medications or procedures; communicating with other healthcare professionals and discussing with patient's family/caregivers.    Current Length of Stay: 5 day(s)  Current Patient Status: Inpatient   Certification Statement: The patient will continue to require additional inpatient hospital stay due to spinal biopsy  Discharge Plan: Anticipate discharge in 48-72 hrs to home with home services.    Code Status: Level 1 - Full Code    Subjective:   Patient overall feels well this morning, notes chronic back pain.  Poor sleep overnight.  Denies chest pain/palpitations, shortness of breath, nausea/vomiting, abdominal pain, constipation or diarrhea.  Denies saddle anesthesia, lower extremity weakness, bowel or bladder incontinence.    Objective:     Vitals:   Temp (24hrs), Av.4 °F (36.9 °C), Min:97.9 °F (36.6 °C), Max:98.8 °F (37.1 °C)    Temp:  [97.9 °F (36.6 °C)-98.8 °F (37.1 °C)] 98.1 °F (36.7 °C)  HR:  [65-82] 70  Resp:  [12-22] 22  BP: (133-143)/(72-82) 141/75  SpO2:  [95 %-100 %] 95 %  Body  mass index is 26.58 kg/m².     Input and Output Summary (last 24 hours):     Intake/Output Summary (Last 24 hours) at 1/29/2024 1201  Last data filed at 1/28/2024 1349  Gross per 24 hour   Intake 180 ml   Output --   Net 180 ml       Physical Exam:   Physical Exam  Vitals and nursing note reviewed.   Constitutional:       General: He is not in acute distress.     Appearance: He is well-developed.      Comments: No acute distress   HENT:      Head: Normocephalic and atraumatic.   Eyes:      General: No scleral icterus.     Extraocular Movements: Extraocular movements intact.      Conjunctiva/sclera: Conjunctivae normal.   Cardiovascular:      Rate and Rhythm: Normal rate and regular rhythm.      Heart sounds: No murmur heard.  Pulmonary:      Effort: Pulmonary effort is normal. No respiratory distress.      Breath sounds: Normal breath sounds. No wheezing, rhonchi or rales.   Abdominal:      General: Bowel sounds are normal.      Palpations: Abdomen is soft.      Tenderness: There is no abdominal tenderness. There is no guarding or rebound.   Musculoskeletal:         General: No swelling.      Cervical back: Normal range of motion.      Comments: Able to move upper/lower extremities bilaterally, strength 5/5 throughout.  No edema   Skin:     General: Skin is warm and dry.      Capillary Refill: Capillary refill takes less than 2 seconds.   Neurological:      General: No focal deficit present.      Mental Status: He is alert and oriented to person, place, and time.   Psychiatric:         Mood and Affect: Mood normal.         Speech: Speech normal.         Behavior: Behavior normal.          Additional Data:     Labs:  Results from last 7 days   Lab Units 01/29/24  0405   WBC Thousand/uL 8.06   HEMOGLOBIN g/dL 11.4*   HEMATOCRIT % 37.6   PLATELETS Thousands/uL 379   NEUTROS PCT % 59   LYMPHS PCT % 27   MONOS PCT % 8   EOS PCT % 5     Results from last 7 days   Lab Units 01/29/24  0405 01/25/24  0520 01/24/24  0545    SODIUM mmol/L 133*   < > 137   POTASSIUM mmol/L 3.9   < > 3.7   CHLORIDE mmol/L 94*   < > 97   CO2 mmol/L 29   < > 33*   BUN mg/dL 20   < > 13   CREATININE mg/dL 0.95   < > 0.75   ANION GAP mmol/L 10   < > 7   CALCIUM mg/dL 10.2   < > 9.2   ALBUMIN g/dL  --   --  3.9   TOTAL BILIRUBIN mg/dL  --   --  0.22   ALK PHOS U/L  --   --  71   ALT U/L  --   --  13   AST U/L  --   --  16   GLUCOSE RANDOM mg/dL 214*   < > 195*    < > = values in this interval not displayed.     Results from last 7 days   Lab Units 01/27/24  0031   INR  0.94     Results from last 7 days   Lab Units 01/29/24  1058 01/29/24  0749 01/28/24  2040 01/28/24  1552 01/28/24  1059 01/28/24  0730 01/27/24  2035 01/27/24  1719 01/27/24  1102 01/27/24  0737 01/26/24  2036 01/26/24  1617   POC GLUCOSE mg/dl 195* 197* 114 177* 203* 190* 182* 147* 270* 147* 135 137     Results from last 7 days   Lab Units 01/24/24  1519   HEMOGLOBIN A1C % 8.2*     Results from last 7 days   Lab Units 01/24/24  1516   LACTIC ACID mmol/L 1.6       Lines/Drains:  Invasive Devices       Peripheral Intravenous Line  Duration             Peripheral IV 01/29/24 Left;Ventral (anterior) Forearm <1 day                          Imaging: No pertinent imaging reviewed.    Recent Cultures (last 7 days):   Results from last 7 days   Lab Units 01/24/24  1516   BLOOD CULTURE  No Growth After 4 Days.  No Growth After 4 Days.       Last 24 Hours Medication List:   Current Facility-Administered Medications   Medication Dose Route Frequency Provider Last Rate    acetaminophen  650 mg Oral Q6H PRN Sveta Smith PA-C      aluminum-magnesium hydroxide-simethicone  30 mL Oral Q6H PRN Sveta Smith PA-C      clonazePAM  0.5 mg Oral Daily PRN Tiffany Cobb MD      clonazePAM  1 mg Oral BID Tiffany Cobb MD      finasteride  5 mg Oral Daily Sveta Smith PA-C      fluticasone  1 spray Nasal Daily PRN Sveta Smith PA-C      folic acid  2,000 mcg Oral Daily Sveta Smith PA-C      gabapentin  400 mg Oral BID Sveta  Luis, DANIEL      heparin (porcine)  5,000 Units Subcutaneous Q8H [facility] Sintia Soto, DANIEL      losartan  100 mg Oral Daily Adelita Marina, DO      And    hydroCHLOROthiazide  25 mg Oral Daily Adelita Marina, DO      insulin lispro  1-6 Units Subcutaneous TID AC Sveta Smith, DANIEL      insulin lispro  1-6 Units Subcutaneous HS Sveta Smith, DANIEL      lidocaine  1 patch Topical Daily Sveta Smith, DANIEL      melatonin  3 mg Oral HS Sveta Smith, DANIEL      methocarbamol  750 mg Oral Q8H PRN Sveta Smith, DANIEL      metoprolol succinate  75 mg Oral QAM Sveta Smith, DANIEL      mirtazapine  45 mg Oral HS Sveta Smith, DANIEL      morphine injection  4 mg Intravenous Q4H PRN Sveta Smith, DANIEL      NIFEdipine  120 mg Oral Daily Adelita Marina, DO      ondansetron  4 mg Intravenous Q6H PRN Sveta Smith, DANIEL      oxyCODONE  10 mg Oral Q4H PRN Sveta Smith, DANIEL      oxyCODONE  5 mg Oral Q4H PRN Sveta Smith, DANIEL      pravastatin  20 mg Oral Daily With Dinner Sveta Smith PA-C      sertraline  100 mg Oral Daily Adelita Marina, DO      tamsulosin  0.4 mg Oral Daily With Dinner Sveta Smith, DANIEL          Today, Patient Was Seen By: Sintia Soto PA-C    **Please Note: This note may have been constructed using a voice recognition system.**

## 2024-01-29 NOTE — CASE MANAGEMENT
Case Management Discharge Planning Note    Patient name Juan San  Location /-01 MRN [id number]  : 1955 Date 2024       Current Admission Date: 2024  Current Admission Diagnosis:Discitis of lumbosacral region   Patient Active Problem List    Diagnosis Date Noted    Foraminal stenosis of lumbar region 2024    Discitis of lumbosacral region 2024    Hypochromic microcytic anemia 10/10/2023    Fall 10/09/2023    Chronic bilateral low back pain with sciatica 10/09/2023    Anxiety and depression 10/09/2023    Hypertension 10/09/2023    Benign prostatic hyperplasia with urinary retention 2023    Scrotal pain 2023    Lower urinary tract symptoms 2023    Status post lumbar spinal fusion 2023    Hyponatremia 2023    Gallstones 2023    Anemia 2023    Urinary retention 2023    PONV (postoperative nausea and vomiting)     Medical marijuana use     Chronic bilateral low back pain without sciatica 2023    Lumbar radiculopathy     Chronic pain syndrome 2022    Liver disease 08/10/2022    Bronchitis, mucopurulent recurrent (HCC) 2022    Cirrhosis, alcoholic (HCC) 2022    Diabetic peripheral neuropathy (HCC) 2022    Herniated nucleus pulposus of lumbosacral region 2022    Thyroid cancer (HCC) 2021    Alcoholism in remission (HCC) 2021    Benzodiazepine dependence (HCC) 2021    Bilateral adhesive capsulitis of shoulders 2021    DM (diabetes mellitus) (HCC) 2021    Hypercholesterolemia 2021    Lumbar spondylosis 2021      LOS (days): 5  Geometric Mean LOS (GMLOS) (days): 2.9  Days to GMLOS:-2     OBJECTIVE:  Risk of Unplanned Readmission Score: 37.23         Current admission status: Inpatient   Preferred Pharmacy:   [facility] [address] [address]  Phone: [phone] Fax:  [phone]    Primary Care Provider: Sabra Magaña DO    Primary Insurance: [employer]  REP  Secondary Insurance: [facility]    DISCHARGE DETAILS:        Pt is recommended to have home health by therapy. CM sent referrals via Aidin.                         Requested Home Health Care         Is the patient interested in HHC at discharge?: Yes  Home Health Discipline requested:: Occupational Therapy, Physical Therapy  Home Health Follow-Up Provider:: PCP  Home Health Services Needed:: Evaluate Functional Status and Safety, Gait/ADL Training, Strengthening/Theraputic Exercises to Improve Function  Homebound Criteria Met:: Requires the Assistance of Another Person for Safe Ambulation or to Leave the Home  Supporting Clincal Findings:: Limited Endurance         Other Referral/Resources/Interventions Provided:  Interventions: HHC         Treatment Team Recommendation: Home with Home Health Care  Discharge Destination Plan:: Home with Home Health Care                                IMM Given (Date):: 01/29/24 (913am)  IMM Given to:: Patient

## 2024-01-29 NOTE — ASSESSMENT & PLAN NOTE
S/P partial thyroidectomy  Not maintained on levothyroxine - levels have been stable  Follows with [employer] - in remission

## 2024-01-29 NOTE — PLAN OF CARE
Problem: Potential for Falls  Goal: Patient will remain free of falls  Description: INTERVENTIONS:  - Educate patient/family on patient safety including physical limitations  - Instruct patient to call for assistance with activity   - Consult OT/PT to assist with strengthening/mobility   - Keep Call bell within reach  - Keep bed low and locked with side rails adjusted as appropriate  - Keep care items and personal belongings within reach  - Initiate and maintain comfort rounds  - Make Fall Risk Sign visible to staff  - Offer Toileting every 2 Hours, in advance of need  - Initiate/Maintain call bell alarm  - Obtain necessary fall risk management equipment: call bell usage  - Apply yellow socks and bracelet for high fall risk patients  - Consider moving patient to room near nurses station  Outcome: Progressing     Problem: PAIN - ADULT  Goal: Verbalizes/displays adequate comfort level or baseline comfort level  Description: Interventions:  - Encourage patient to monitor pain and request assistance  - Assess pain using appropriate pain scale  - Administer analgesics based on type and severity of pain and evaluate response  - Implement non-pharmacological measures as appropriate and evaluate response  - Consider cultural and social influences on pain and pain management  - Notify physician/advanced practitioner if interventions unsuccessful or patient reports new pain  Outcome: Progressing     Problem: INFECTION - ADULT  Goal: Absence or prevention of progression during hospitalization  Description: INTERVENTIONS:  - Assess and monitor for signs and symptoms of infection  - Monitor lab/diagnostic results  - Monitor all insertion sites, i.e. indwelling lines, tubes, and drains  - Monitor endotracheal if appropriate and nasal secretions for changes in amount and color  - [place] appropriate cooling/warming therapies per order  - Administer medications as ordered  - Instruct and encourage patient and family to use good  hand hygiene technique  - Identify and instruct in appropriate isolation precautions for identified infection/condition  Outcome: Progressing  Goal: Absence of fever/infection during neutropenic period  Description: INTERVENTIONS:  - Monitor WBC    Outcome: Progressing

## 2024-01-29 NOTE — PLAN OF CARE
Problem: Potential for Falls  Goal: Patient will remain free of falls  Description: INTERVENTIONS:  - Educate patient/family on patient safety including physical limitations  - Instruct patient to call for assistance with activity   - Consult OT/PT to assist with strengthening/mobility   - Keep Call bell within reach  - Keep bed low and locked with side rails adjusted as appropriate  - Keep care items and personal belongings within reach  - Initiate and maintain comfort rounds  - Make Fall Risk Sign visible to staff  - Offer Toileting every  Hours, in advance of need  - Initiate/Maintain alarm  - Obtain necessary fall risk management equipment:   - Apply yellow socks and bracelet for high fall risk patients  - Consider moving patient to room near nurses station  Outcome: Progressing     Problem: PAIN - ADULT  Goal: Verbalizes/displays adequate comfort level or baseline comfort level  Description: Interventions:  - Encourage patient to monitor pain and request assistance  - Assess pain using appropriate pain scale  - Administer analgesics based on type and severity of pain and evaluate response  - Implement non-pharmacological measures as appropriate and evaluate response  - Consider cultural and social influences on pain and pain management  - Notify physician/advanced practitioner if interventions unsuccessful or patient reports new pain  Outcome: Progressing     Problem: INFECTION - ADULT  Goal: Absence or prevention of progression during hospitalization  Description: INTERVENTIONS:  - Assess and monitor for signs and symptoms of infection  - Monitor lab/diagnostic results  - Monitor all insertion sites, i.e. indwelling lines, tubes, and drains  - Monitor endotracheal if appropriate and nasal secretions for changes in amount and color  - [place] appropriate cooling/warming therapies per order  - Administer medications as ordered  - Instruct and encourage patient and family to use good hand hygiene technique  -  Identify and instruct in appropriate isolation precautions for identified infection/condition  Outcome: Progressing  Goal: Absence of fever/infection during neutropenic period  Description: INTERVENTIONS:  - Monitor WBC    Outcome: Progressing     Problem: SAFETY ADULT  Goal: Patient will remain free of falls  Description: INTERVENTIONS:  - Educate patient/family on patient safety including physical limitations  - Instruct patient to call for assistance with activity   - Consult OT/PT to assist with strengthening/mobility   - Keep Call bell within reach  - Keep bed low and locked with side rails adjusted as appropriate  - Keep care items and personal belongings within reach  - Initiate and maintain comfort rounds  - Make Fall Risk Sign visible to staff  - Offer Toileting every  Hours, in advance of need  - Initiate/Maintain alarm  - Obtain necessary fall risk management equipment:   - Apply yellow socks and bracelet for high fall risk patients  - Consider moving patient to room near nurses station  Outcome: Progressing  Goal: Maintain or return to baseline ADL function  Description: INTERVENTIONS:  -  Assess patient's ability to carry out ADLs; assess patient's baseline for ADL function and identify physical deficits which impact ability to perform ADLs (bathing, care of mouth/teeth, toileting, grooming, dressing, etc.)  - Assess/evaluate cause of self-care deficits   - Assess range of motion  - Assess patient's mobility; develop plan if impaired  - Assess patient's need for assistive devices and provide as appropriate  - Encourage maximum independence but intervene and supervise when necessary  - Involve family in performance of ADLs  - Assess for home care needs following discharge   - Consider OT consult to assist with ADL evaluation and planning for discharge  - Provide patient education as appropriate  Outcome: Progressing  Goal: Maintains/Returns to pre admission functional level  Description:  INTERVENTIONS:  - Perform AM-PAC 6 Click Basic Mobility/ Daily Activity assessment daily.  - Set and communicate daily mobility goal to care team and patient/family/caregiver.   - Collaborate with rehabilitation services on mobility goals if consulted  - Perform Range of Motion  times a day.  - Reposition patient every  hours.  - Dangle patient  times a day  - Stand patient  times a day  - Ambulate patient  times a day  - Out of bed to chair  times a day   - Out of bed for meals  times a day  - Out of bed for toileting  - Record patient progress and toleration of activity level   Outcome: Progressing     Problem: DISCHARGE PLANNING  Goal: Discharge to home or other facility with appropriate resources  Description: INTERVENTIONS:  - Identify barriers to discharge w/patient and caregiver  - Arrange for needed discharge resources and transportation as appropriate  - Identify discharge learning needs (meds, wound care, etc.)  - Arrange for interpretive services to assist at discharge as needed  - Refer to Case Management Department for coordinating discharge planning if the patient needs post-hospital services based on physician/advanced practitioner order or complex needs related to functional status, cognitive ability, or social support system  Outcome: Progressing     Problem: Knowledge Deficit  Goal: Patient/family/caregiver demonstrates understanding of disease process, treatment plan, medications, and discharge instructions  Description: Complete learning assessment and assess knowledge base.  Interventions:  - Provide teaching at level of understanding  - Provide teaching via preferred learning methods  Outcome: Progressing     Problem: MUSCULOSKELETAL - ADULT  Goal: Maintain or return mobility to safest level of function  Description: INTERVENTIONS:  - Assess patient's ability to carry out ADLs; assess patient's baseline for ADL function and identify physical deficits which impact ability to perform ADLs  (bathing, care of mouth/teeth, toileting, grooming, dressing, etc.)  - Assess/evaluate cause of self-care deficits   - Assess range of motion  - Assess patient's mobility  - Assess patient's need for assistive devices and provide as appropriate  - Encourage maximum independence but intervene and supervise when necessary  - Involve family in performance of ADLs  - Assess for home care needs following discharge   - Consider OT consult to assist with ADL evaluation and planning for discharge  - Provide patient education as appropriate  Outcome: Progressing  Goal: Maintain proper alignment of affected body part  Description: INTERVENTIONS:  - Support, maintain and protect limb and body alignment  - Provide patient/ family with appropriate education  Outcome: Progressing

## 2024-01-29 NOTE — ASSESSMENT & PLAN NOTE
Lab Results   Component Value Date    HGBA1C 8.2 (H) 01/24/2024       Recent Labs     01/28/24  1552 01/28/24  2040 01/29/24  0749 01/29/24  1058   POCGLU 177* 114 197* 195*       Blood Sugar Average: Last 72 hrs:  (P) [id number]  Maintained on metformin and jardiance outpatient.   Hold while admitted, can resume on discharge  SSI with meals while admitted  Current Accu-Cheks and hypoglycemia protocol

## 2024-01-29 NOTE — PROGRESS NOTES
Patient:  WINSOME LUGO    MRN:  [id number]    Aidin Request ID:  [id number]    Level of care reserved:  Home Health Agency    Partner Reserved:  [facility], [place] [id number] [phone]    Clinical needs requested:    Geography searched:  [id number]    Start of Service:    Request sent:  12:32pm EST on 1/29/2024 by Lona Bloom    Partner reserved:  2:32pm EST on 1/29/2024 by Lona Bloom    Choice list shared:  2:31pm EST on 1/29/2024 by Lona Bloom

## 2024-01-29 NOTE — CASE MANAGEMENT
Case Management Discharge Planning Note    Patient name Juan San  Location /-01 MRN [id number]  : 1955 Date 2024       Current Admission Date: 2024  Current Admission Diagnosis:Discitis of lumbosacral region   Patient Active Problem List    Diagnosis Date Noted    Foraminal stenosis of lumbar region 2024    Discitis of lumbosacral region 2024    Hypochromic microcytic anemia 10/10/2023    Fall 10/09/2023    Chronic bilateral low back pain with sciatica 10/09/2023    Anxiety and depression 10/09/2023    Hypertension 10/09/2023    Benign prostatic hyperplasia with urinary retention 2023    Scrotal pain 2023    Lower urinary tract symptoms 2023    Status post lumbar spinal fusion 2023    Hyponatremia 2023    Gallstones 2023    Anemia 2023    Urinary retention 2023    PONV (postoperative nausea and vomiting)     Medical marijuana use     Chronic bilateral low back pain without sciatica 2023    Lumbar radiculopathy     Chronic pain syndrome 2022    Liver disease 08/10/2022    Bronchitis, mucopurulent recurrent (HCC) 2022    Cirrhosis, alcoholic (HCC) 2022    Diabetic peripheral neuropathy (HCC) 2022    Herniated nucleus pulposus of lumbosacral region 2022    Thyroid cancer (HCC) 2021    Alcoholism in remission (HCC) 2021    Benzodiazepine dependence (HCC) 2021    Bilateral adhesive capsulitis of shoulders 2021    DM (diabetes mellitus) (HCC) 2021    Hypercholesterolemia 2021    Lumbar spondylosis 2021      LOS (days): 5  Geometric Mean LOS (GMLOS) (days): 2.9  Days to GMLOS:-2     OBJECTIVE:  Risk of Unplanned Readmission Score: 37.23         Current admission status: Inpatient   Preferred Pharmacy:   [facility] [address] [address]  Phone: [phone] Fax:  [phone]    Primary Care Provider: Sabra Magaña DO    Primary Insurance: [employer]  REP  Secondary Insurance: [facility]    DISCHARGE DETAILS:     IMM reviewed with patient, patient agrees with discharge determination.           IMM Given (Date):: 01/29/24 (913am)  IMM Given to:: Patient

## 2024-01-29 NOTE — ASSESSMENT & PLAN NOTE
MRI consistent with discitis/osteomyelitis of L1-2, L4-5, L5-S1 regions.  Neurosurgery consulted  Per ER discussion with neurosurgery, obtain blood cultures and hold off on antibiotics pending results unless patient clinically declines  WBC WNL, afebrile  Consider transfer if patient becomes unstable/develops red flag symptoms however exam remains stable  Discussed with ID - recommend IR consult for possible biopsy  Earliest will be tomorrow 1/30 as pt was on ASA and needs to be held for 5 days  N.p.o. after midnight  Hold heparin after midnight  Blood cultures negative x2 negative so far

## 2024-01-30 ENCOUNTER — APPOINTMENT (OUTPATIENT)
Dept: PHYSICAL THERAPY | Facility: CLINIC | Age: 69
End: 2024-01-30
Payer: COMMERCIAL

## 2024-01-30 ENCOUNTER — APPOINTMENT (INPATIENT)
Dept: CT IMAGING | Facility: HOSPITAL | Age: 69
DRG: 552 | End: 2024-01-30
Attending: RADIOLOGY
Payer: COMMERCIAL

## 2024-01-30 LAB
ANION GAP SERPL CALCULATED.3IONS-SCNC: 9 MMOL/L
BASOPHILS # BLD AUTO: 0.07 THOUSANDS/ÂΜL (ref 0–0.1)
BASOPHILS NFR BLD AUTO: 1 % (ref 0–1)
BUN SERPL-MCNC: 21 MG/DL (ref 5–25)
CALCIUM SERPL-MCNC: 9.5 MG/DL (ref 8.4–10.2)
CHLORIDE SERPL-SCNC: 96 MMOL/L (ref 96–108)
CO2 SERPL-SCNC: 28 MMOL/L (ref 21–32)
CREAT SERPL-MCNC: 0.86 MG/DL (ref 0.6–1.3)
EOSINOPHIL # BLD AUTO: 0.46 THOUSAND/ÂΜL (ref 0–0.61)
EOSINOPHIL NFR BLD AUTO: 6 % (ref 0–6)
ERYTHROCYTE [DISTWIDTH] IN BLOOD BY AUTOMATED COUNT: 16.6 % (ref 11.6–15.1)
GFR SERPL CREATININE-BSD FRML MDRD: 90 ML/MIN/1.73SQ M
GLUCOSE SERPL-MCNC: 113 MG/DL (ref 65–140)
GLUCOSE SERPL-MCNC: 150 MG/DL (ref 65–140)
GLUCOSE SERPL-MCNC: 162 MG/DL (ref 65–140)
GLUCOSE SERPL-MCNC: 194 MG/DL (ref 65–140)
GLUCOSE SERPL-MCNC: 99 MG/DL (ref 65–140)
HCT VFR BLD AUTO: 38 % (ref 36.5–49.3)
HGB BLD-MCNC: 11.6 G/DL (ref 12–17)
IMM GRANULOCYTES # BLD AUTO: 0.03 THOUSAND/UL (ref 0–0.2)
IMM GRANULOCYTES NFR BLD AUTO: 0 % (ref 0–2)
LYMPHOCYTES # BLD AUTO: 2.03 THOUSANDS/ÂΜL (ref 0.6–4.47)
LYMPHOCYTES NFR BLD AUTO: 26 % (ref 14–44)
MCH RBC QN AUTO: 25.2 PG (ref 26.8–34.3)
MCHC RBC AUTO-ENTMCNC: 30.5 G/DL (ref 31.4–37.4)
MCV RBC AUTO: 82 FL (ref 82–98)
MONOCYTES # BLD AUTO: 0.67 THOUSAND/ÂΜL (ref 0.17–1.22)
MONOCYTES NFR BLD AUTO: 9 % (ref 4–12)
NEUTROPHILS # BLD AUTO: 4.65 THOUSANDS/ÂΜL (ref 1.85–7.62)
NEUTS SEG NFR BLD AUTO: 58 % (ref 43–75)
NRBC BLD AUTO-RTO: 0 /100 WBCS
PLATELET # BLD AUTO: 395 THOUSANDS/UL (ref 149–390)
PMV BLD AUTO: 9.8 FL (ref 8.9–12.7)
POTASSIUM SERPL-SCNC: 3.8 MMOL/L (ref 3.5–5.3)
RBC # BLD AUTO: 4.61 MILLION/UL (ref 3.88–5.62)
SODIUM SERPL-SCNC: 133 MMOL/L (ref 135–147)
WBC # BLD AUTO: 7.91 THOUSAND/UL (ref 4.31–10.16)

## 2024-01-30 PROCEDURE — 99153 MOD SED SAME PHYS/QHP EA: CPT

## 2024-01-30 PROCEDURE — 88341 IMHCHEM/IMCYTCHM EA ADD ANTB: CPT | Performed by: PATHOLOGY

## 2024-01-30 PROCEDURE — 99152 MOD SED SAME PHYS/QHP 5/>YRS: CPT

## 2024-01-30 PROCEDURE — 85025 COMPLETE CBC W/AUTO DIFF WBC: CPT | Performed by: INTERNAL MEDICINE

## 2024-01-30 PROCEDURE — 88312 SPECIAL STAINS GROUP 1: CPT | Performed by: PATHOLOGY

## 2024-01-30 PROCEDURE — 77011 CT SCAN FOR LOCALIZATION: CPT

## 2024-01-30 PROCEDURE — 88342 IMHCHEM/IMCYTCHM 1ST ANTB: CPT | Performed by: PATHOLOGY

## 2024-01-30 PROCEDURE — 99232 SBSQ HOSP IP/OBS MODERATE 35: CPT | Performed by: PHYSICIAN ASSISTANT

## 2024-01-30 PROCEDURE — 80048 BASIC METABOLIC PNL TOTAL CA: CPT | Performed by: INTERNAL MEDICINE

## 2024-01-30 PROCEDURE — 88305 TISSUE EXAM BY PATHOLOGIST: CPT | Performed by: PATHOLOGY

## 2024-01-30 PROCEDURE — 10009 FNA BX W/CT GDN 1ST LES: CPT

## 2024-01-30 PROCEDURE — 82948 REAGENT STRIP/BLOOD GLUCOSE: CPT

## 2024-01-30 RX ORDER — FENTANYL CITRATE 50 UG/ML
INJECTION, SOLUTION INTRAMUSCULAR; INTRAVENOUS AS NEEDED
Status: COMPLETED | OUTPATIENT
Start: 2024-01-30 | End: 2024-01-30

## 2024-01-30 RX ORDER — SENNOSIDES 8.6 MG
2 TABLET ORAL
Status: DISCONTINUED | OUTPATIENT
Start: 2024-01-30 | End: 2024-02-04

## 2024-01-30 RX ORDER — LIDOCAINE HYDROCHLORIDE 10 MG/ML
INJECTION, SOLUTION EPIDURAL; INFILTRATION; INTRACAUDAL; PERINEURAL AS NEEDED
Status: COMPLETED | OUTPATIENT
Start: 2024-01-30 | End: 2024-01-30

## 2024-01-30 RX ORDER — DOCUSATE SODIUM 100 MG/1
100 CAPSULE, LIQUID FILLED ORAL 2 TIMES DAILY
Status: DISCONTINUED | OUTPATIENT
Start: 2024-01-30 | End: 2024-02-06 | Stop reason: HOSPADM

## 2024-01-30 RX ORDER — POLYETHYLENE GLYCOL 3350 17 G/17G
17 POWDER, FOR SOLUTION ORAL DAILY
Status: DISCONTINUED | OUTPATIENT
Start: 2024-01-31 | End: 2024-02-06 | Stop reason: HOSPADM

## 2024-01-30 RX ORDER — MIDAZOLAM HYDROCHLORIDE 2 MG/2ML
INJECTION, SOLUTION INTRAMUSCULAR; INTRAVENOUS AS NEEDED
Status: COMPLETED | OUTPATIENT
Start: 2024-01-30 | End: 2024-01-30

## 2024-01-30 RX ADMIN — Medication 3 MG: at 21:22

## 2024-01-30 RX ADMIN — CLONAZEPAM 1 MG: 1 TABLET ORAL at 08:00

## 2024-01-30 RX ADMIN — FINASTERIDE 5 MG: 5 TABLET, FILM COATED ORAL at 08:01

## 2024-01-30 RX ADMIN — NIFEDIPINE 120 MG: 30 TABLET, EXTENDED RELEASE ORAL at 08:01

## 2024-01-30 RX ADMIN — ACETAMINOPHEN 325MG 650 MG: 325 TABLET ORAL at 04:05

## 2024-01-30 RX ADMIN — HYDROCHLOROTHIAZIDE 25 MG: 25 TABLET ORAL at 08:00

## 2024-01-30 RX ADMIN — GABAPENTIN 400 MG: 400 CAPSULE ORAL at 16:59

## 2024-01-30 RX ADMIN — OXYCODONE HYDROCHLORIDE 10 MG: 10 TABLET ORAL at 16:56

## 2024-01-30 RX ADMIN — FOLIC ACID 2000 MCG: 1 TABLET ORAL at 08:00

## 2024-01-30 RX ADMIN — LOSARTAN POTASSIUM 100 MG: 50 TABLET, FILM COATED ORAL at 08:00

## 2024-01-30 RX ADMIN — GABAPENTIN 400 MG: 400 CAPSULE ORAL at 08:00

## 2024-01-30 RX ADMIN — SERTRALINE HYDROCHLORIDE 100 MG: 100 TABLET ORAL at 08:00

## 2024-01-30 RX ADMIN — LIDOCAINE HYDROCHLORIDE 8 ML: 10 INJECTION, SOLUTION EPIDURAL; INFILTRATION; INTRACAUDAL; PERINEURAL at 13:18

## 2024-01-30 RX ADMIN — OXYCODONE HYDROCHLORIDE 10 MG: 10 TABLET ORAL at 03:32

## 2024-01-30 RX ADMIN — MORPHINE SULFATE 4 MG: 4 INJECTION, SOLUTION INTRAMUSCULAR; INTRAVENOUS at 19:18

## 2024-01-30 RX ADMIN — OXYCODONE HYDROCHLORIDE 10 MG: 10 TABLET ORAL at 21:22

## 2024-01-30 RX ADMIN — PRAVASTATIN SODIUM 20 MG: 20 TABLET ORAL at 16:09

## 2024-01-30 RX ADMIN — OXYCODONE HYDROCHLORIDE 10 MG: 10 TABLET ORAL at 08:01

## 2024-01-30 RX ADMIN — FENTANYL CITRATE 100 MCG: 50 INJECTION, SOLUTION INTRAMUSCULAR; INTRAVENOUS at 13:18

## 2024-01-30 RX ADMIN — MORPHINE SULFATE 4 MG: 4 INJECTION, SOLUTION INTRAMUSCULAR; INTRAVENOUS at 06:35

## 2024-01-30 RX ADMIN — MIDAZOLAM 2 MG: 1 INJECTION INTRAMUSCULAR; INTRAVENOUS at 13:18

## 2024-01-30 RX ADMIN — TAMSULOSIN HYDROCHLORIDE 0.4 MG: 0.4 CAPSULE ORAL at 16:09

## 2024-01-30 RX ADMIN — SENNOSIDES 17.2 MG: 8.6 TABLET, FILM COATED ORAL at 21:22

## 2024-01-30 RX ADMIN — INSULIN LISPRO 2 UNITS: 100 INJECTION, SOLUTION INTRAVENOUS; SUBCUTANEOUS at 21:23

## 2024-01-30 RX ADMIN — LIDOCAINE 5% 1 PATCH: 700 PATCH TOPICAL at 08:00

## 2024-01-30 RX ADMIN — CLONAZEPAM 1 MG: 1 TABLET ORAL at 16:59

## 2024-01-30 RX ADMIN — MIRTAZAPINE 45 MG: 15 TABLET, FILM COATED ORAL at 21:22

## 2024-01-30 RX ADMIN — MORPHINE SULFATE 4 MG: 4 INJECTION, SOLUTION INTRAMUSCULAR; INTRAVENOUS at 14:18

## 2024-01-30 RX ADMIN — MORPHINE SULFATE 4 MG: 4 INJECTION, SOLUTION INTRAMUSCULAR; INTRAVENOUS at 00:12

## 2024-01-30 RX ADMIN — DOCUSATE SODIUM 100 MG: 100 CAPSULE, LIQUID FILLED ORAL at 21:22

## 2024-01-30 RX ADMIN — METOPROLOL SUCCINATE 75 MG: 50 TABLET, EXTENDED RELEASE ORAL at 08:00

## 2024-01-30 NOTE — SEDATION DOCUMENTATION
Spine bx completed. Patient tolerated well. Band aid to site. Awake and hemodynamically stable for transfer to room. Report and care given to primary RN.

## 2024-01-30 NOTE — PLAN OF CARE

## 2024-01-30 NOTE — ASSESSMENT & PLAN NOTE
Lab Results   Component Value Date    HGBA1C 8.2 (H) 01/24/2024       Recent Labs     01/29/24  1058 01/29/24  1612 01/29/24 2022 01/30/24  0809   POCGLU 195* 143* 200* 162*         Blood Sugar Average: Last 72 hrs:  (P) 179  Maintained on metformin and jardiance outpatient.   Hold while admitted, can resume on discharge  SSI with meals while admitted  Current Accu-Cheks and hypoglycemia protocol

## 2024-01-30 NOTE — PROGRESS NOTES
[facility]  Progress Note  Name: Juan San I  MRN: [id number]  Unit/Bed#: -01 I Date of Admission: 1/24/2024   Date of Service: 1/30/2024 I Hospital Day: 6    Assessment/Plan   * Discitis of lumbosacral region  Assessment & Plan  MRI consistent with discitis/osteomyelitis of L1-2, L4-5, L5-S1 regions.  Neurosurgery consulted  Per ER discussion with neurosurgery, obtain blood cultures and hold off on antibiotics pending results unless patient clinically declines  WBC WNL, afebrile  Consider transfer if patient becomes unstable/develops red flag symptoms however exam remains stable  Discussed with ID - recommend IR consult for possible biopsy  Anticipated 1/30 as pt was on ASA and needs to be held for 5 days  NPO awaiting procedure  Heparin on hold  Blood cultures negative x2 negative so far    Herniated nucleus pulposus of lumbosacral region  Assessment & Plan  S/P surgical intervention April 2023    Hypertension  Assessment & Plan  Home regimen includes losartan/HCTZ, metoprolol, nifedipine  Blood pressure stable    Benign prostatic hyperplasia with urinary retention  Assessment & Plan  Continue finasteride and tamsulosin  Urinary retention protocol    Thyroid cancer (HCC)  Assessment & Plan  S/P partial thyroidectomy  Not maintained on levothyroxine - levels have been stable  Follows with [facility] - in remission    DM (diabetes mellitus) (HCC)  Assessment & Plan  Lab Results   Component Value Date    HGBA1C 8.2 (H) 01/24/2024       Recent Labs     01/29/24  1058 01/29/24  1612 01/29/24  2022 01/30/24  0809   POCGLU 195* 143* 200* 162*         Blood Sugar Average: Last 72 hrs:  (P) 179  Maintained on metformin and jardiance outpatient.   Hold while admitted, can resume on discharge  SSI with meals while admitted  Current Accu-Cheks and hypoglycemia protocol      Cirrhosis, alcoholic (HCC)  Assessment & Plan  Continue outpatient follow-up  Follows with PCP           VTE  Pharmacologic Prophylaxis: VTE Score: 3 Moderate Risk (Score 3-4) - Pharmacological DVT Prophylaxis Ordered: heparin.    Mobility:   Basic Mobility Inpatient Raw Score: 24  JH-HLM Goal: 8: Walk 250 feet or more  JH-HLM Achieved: 8: Walk 250 feet ot more  HLM Goal achieved. Continue to encourage appropriate mobility.    Patient Centered Rounds: I performed bedside rounds with nursing staff today.   Discussions with Specialists or Other Care Team Provider: Appreciate most recent ID note, anticipate further recommendations pending biopsy    Education and Discussions with Family / Patient: Updated  (wife) at bedside.    Total Time Spent on Date of Encounter in care of patient: 35 mins. This time was spent on one or more of the following: performing physical exam; counseling and coordination of care; obtaining or reviewing history; documenting in the medical record; reviewing/ordering tests, medications or procedures; communicating with other healthcare professionals and discussing with patient's family/caregivers.    Current Length of Stay: 6 day(s)  Current Patient Status: Inpatient   Certification Statement: The patient will continue to require additional inpatient hospital stay due to pending spinal biopsy, possible initiation of IV antibiotics  Discharge Plan: Anticipate discharge in 48-72 hrs to home with home services.    Code Status: Level 1 - Full Code    Subjective:   Patient reports persistent pain in his spine.  We discussed his sons addiction to heroin and his fear of being reliant on pain medication.  We discussed that once we establish the source of the pain in his back, we will be able to treat appropriately and hopefully reduce narcotic use.    Objective:     Vitals:   Temp (24hrs), Av.4 °F (36.3 °C), Min:97.3 °F (36.3 °C), Max:97.5 °F (36.4 °C)    Temp:  [97.3 °F (36.3 °C)-97.5 °F (36.4 °C)] 97.3 °F (36.3 °C)  HR:  [69-86] 83  Resp:  [14-16] 14  BP: (127-145)/(68-82) 145/78  SpO2:  [98  %-99 %] 99 %  Body mass index is 26.58 kg/m².     Input and Output Summary (last 24 hours):   No intake or output data in the 24 hours ending 01/30/24 1010    Physical Exam:   Physical Exam  Vitals and nursing note reviewed.   Constitutional:       General: He is not in acute distress.     Appearance: Normal appearance. He is well-developed.   HENT:      Head: Normocephalic and atraumatic.   Eyes:      General: No scleral icterus.     Conjunctiva/sclera: Conjunctivae normal.   Cardiovascular:      Rate and Rhythm: Normal rate and regular rhythm.      Heart sounds: No murmur heard.  Pulmonary:      Effort: Pulmonary effort is normal.      Breath sounds: No wheezing, rhonchi or rales.   Abdominal:      General: There is no distension.      Palpations: Abdomen is soft.   Skin:     General: Skin is warm and dry.   Neurological:      General: No focal deficit present.      Mental Status: He is alert.   Psychiatric:         Mood and Affect: Mood normal.        Additional Data:     Labs:  Results from last 7 days   Lab Units 01/30/24  0335   WBC Thousand/uL 7.91   HEMOGLOBIN g/dL 11.6*   HEMATOCRIT % 38.0   PLATELETS Thousands/uL 395*   NEUTROS PCT % 58   LYMPHS PCT % 26   MONOS PCT % 9   EOS PCT % 6     Results from last 7 days   Lab Units 01/30/24  0335 01/25/24  0520 01/24/24  0545   SODIUM mmol/L 133*   < > 137   POTASSIUM mmol/L 3.8   < > 3.7   CHLORIDE mmol/L 96   < > 97   CO2 mmol/L 28   < > 33*   BUN mg/dL 21   < > 13   CREATININE mg/dL 0.86   < > 0.75   ANION GAP mmol/L 9   < > 7   CALCIUM mg/dL 9.5   < > 9.2   ALBUMIN g/dL  --   --  3.9   TOTAL BILIRUBIN mg/dL  --   --  0.22   ALK PHOS U/L  --   --  71   ALT U/L  --   --  13   AST U/L  --   --  16   GLUCOSE RANDOM mg/dL 99   < > 195*    < > = values in this interval not displayed.     Results from last 7 days   Lab Units 01/27/24  0031   INR  0.94     Results from last 7 days   Lab Units 01/30/24  0809 01/29/24  2022 01/29/24  1612 01/29/24  1058 01/29/24  0749  01/28/24  2040 01/28/24  1552 01/28/24  1059 01/28/24  0730 01/27/24  2035 01/27/24  1719 01/27/24  1102   POC GLUCOSE mg/dl 162* 200* 143* 195* 197* 114 177* 203* 190* 182* 147* 270*     Results from last 7 days   Lab Units 01/24/24  1519   HEMOGLOBIN A1C % 8.2*     Results from last 7 days   Lab Units 01/24/24  1516   LACTIC ACID mmol/L 1.6       Lines/Drains:  Invasive Devices       Peripheral Intravenous Line  Duration             Peripheral IV 01/29/24 Left;Ventral (anterior) Forearm 1 day                          Imaging: Reviewed radiology reports from this admission including: xray(s)    Recent Cultures (last 7 days):   Results from last 7 days   Lab Units 01/24/24  1516   BLOOD CULTURE  No Growth After 5 Days.  No Growth After 5 Days.       Last 24 Hours Medication List:   Current Facility-Administered Medications   Medication Dose Route Frequency Provider Last Rate    acetaminophen  650 mg Oral Q6H PRN Sveta Smith PA-C      aluminum-magnesium hydroxide-simethicone  30 mL Oral Q6H PRN Sveta Smith PA-C      clonazePAM  0.5 mg Oral Daily PRN Tiffany Cobb MD      clonazePAM  1 mg Oral BID Tiffany Cobb MD      finasteride  5 mg Oral Daily Sveta Smith PA-C      fluticasone  1 spray Nasal Daily PRN Sveta Smith PA-C      folic acid  2,000 mcg Oral Daily Sveta Smith PA-C      gabapentin  400 mg Oral BID Sveta Smith PA-C      losartan  100 mg Oral Daily Adelita Marina, DO      And    hydroCHLOROthiazide  25 mg Oral Daily Adelita Marina, DO      insulin lispro  1-6 Units Subcutaneous TID AC Sveta Smith PA-C      insulin lispro  1-6 Units Subcutaneous HS Sveta Smith PA-C      lidocaine  1 patch Topical Daily Sveta Smith PA-C      melatonin  3 mg Oral HS Sveta Smith PA-C      methocarbamol  750 mg Oral Q8H PRN Sveta Smith PA-C      metoprolol succinate  75 mg Oral QAM Sveta Smith PA-C      mirtazapine  45 mg Oral HS Sveta Smith PA-C      morphine injection  4 mg Intravenous Q4H PRN Sveta Smith PA-C      NIFEdipine  120 mg Oral  Daily Adelita Marina,       ondansetron  4 mg Intravenous Q6H PRN Sveta Smith PA-C      oxyCODONE  10 mg Oral Q4H PRN Sveta Smith PA-C      oxyCODONE  5 mg Oral Q4H PRN Sveta Smith PA-C      pravastatin  20 mg Oral Daily With Dinner Sveta Smith PA-C      sertraline  100 mg Oral Daily Adelita Marina DO      tamsulosin  0.4 mg Oral Daily With Dinner Sveta Smith PA-C          Today, Patient Was Seen By: Sveta Smith PA-C    **Please Note: This note may have been constructed using a voice recognition system.**

## 2024-01-30 NOTE — ASSESSMENT & PLAN NOTE
MRI consistent with discitis/osteomyelitis of L1-2, L4-5, L5-S1 regions.  Neurosurgery consulted  Per ER discussion with neurosurgery, obtain blood cultures and hold off on antibiotics pending results unless patient clinically declines  WBC WNL, afebrile  Consider transfer if patient becomes unstable/develops red flag symptoms however exam remains stable  Discussed with ID - recommend IR consult for possible biopsy  Anticipated 1/30 as pt was on ASA and needs to be held for 5 days  NPO awaiting procedure  Heparin on hold  Blood cultures negative x2 negative so far

## 2024-01-30 NOTE — ASSESSMENT & PLAN NOTE
S/P partial thyroidectomy  Not maintained on levothyroxine - levels have been stable  Follows with [place] - in remission

## 2024-01-30 NOTE — PROGRESS NOTES
" Pastoral Care Progress Note    2024  Patient: Juan San : 1955  Admission Date & Time: 2024 0203  MRN: [id number] [facility]: [id number]       24 1200   Clinical Encounter Type   Visited With Patient and family together   Routine Visit Introduction   Referral From Physician   Referral To    [facility] Encounters   [facility] Needs Prayer        Intern visited with pt based on physician referral.  Intern introduced themself to pt and pt's wife and asked how they were feeling today. Pt expressed that he was in pain and that he was receiving a biopsy soon.  Intern discussed pt's feelings around the biopsy. Pt stated that he felt \"anxious\" about the procedure but knows it is something he must do.  Intern prayed with pt and family. Spiritual care remains available.               Chaplaincy Interventions Utilized:   Empowerment: Clarified, confirmed, or reviewed information from treatment team  and Encouraged focus on present    Exploration: Explored spiritual needs & resources, Facilitated story telling, and Identified, evaluated & reinforced appropriate coping strategies    Collaboration: Facilitated respect for spiritual/cultural practice during hospitalization    Relationship Building: Cultivated a relationship of care and support, Listened empathically, Hospitality, and Provided silent and supportive presence    Ritual: Provided prayer    Chaplaincy Outcomes Achieved:  Progressed toward focus on present    Spiritual Coping Strategies Utilized:   Spiritual practices    "

## 2024-01-30 NOTE — PHYSICAL THERAPY NOTE
Physical Therapy Cancellation Note       01/30/24 1327   PT Last Visit   PT Visit Date 01/30/24   Note Type   Note type Cancelled Session   Additional Comments Patient NPO for IR biopsy today. Will continue to follow.     Soha Page

## 2024-01-30 NOTE — PLAN OF CARE

## 2024-01-30 NOTE — OCCUPATIONAL THERAPY NOTE
Occupational Therapy Cancelled Session        Patient Name: Juan San  Today's Date: 1/30/2024 01/30/24 8579   Note Type   Note type Cancelled Session   Cancel Reasons Other   Additional Comments Pt to IR for biopsy today. Will continue to follow pt on OT caseload     DENNIS Broderick/RUBI

## 2024-01-30 NOTE — BRIEF OP NOTE (RAD/CATH)
INTERVENTIONAL RADIOLOGY PROCEDURE NOTE    Date: 1/30/2024    Procedure: L1-L2 disk aspiration  Procedure Summary       Date:  Room / Location:     Anesthesia Start:  Anesthesia Stop:     Procedure:  Diagnosis:     Scheduled Providers:  Responsible Provider:     Anesthesia Type: Not recorded ASA Status: Not recorded            Preoperative diagnosis:   1. Acute exacerbation of chronic low back pain    2. Discitis    3. Osteomyelitis (HCC)    4. Discitis of lumbosacral region    5. Foraminal stenosis of lumbar region    6. Type 2 diabetes mellitus with hyperglycemia, without long-term current use of insulin (HCC)         Postoperative diagnosis: Same.    Surgeon: Laron Alex MD     Assistant: None. No qualified resident was available.    Blood loss: None    Specimens: DISK aspirate     Findings: Under CT fluoro 18 G needle used to aspirate L1-L2 disk.     L4-L5 and L5-S1 disk cannot be accessed due to bony pelvis.    Complications: None immediate.    Anesthesia: conscious sedation and local

## 2024-01-31 LAB
BASOPHILS # BLD AUTO: 0.06 THOUSANDS/ÂΜL (ref 0–0.1)
BASOPHILS NFR BLD AUTO: 1 % (ref 0–1)
EOSINOPHIL # BLD AUTO: 0.39 THOUSAND/ÂΜL (ref 0–0.61)
EOSINOPHIL NFR BLD AUTO: 4 % (ref 0–6)
ERYTHROCYTE [DISTWIDTH] IN BLOOD BY AUTOMATED COUNT: 16.6 % (ref 11.6–15.1)
GLUCOSE SERPL-MCNC: 114 MG/DL (ref 65–140)
GLUCOSE SERPL-MCNC: 114 MG/DL (ref 65–140)
GLUCOSE SERPL-MCNC: 140 MG/DL (ref 65–140)
GLUCOSE SERPL-MCNC: 279 MG/DL (ref 65–140)
HCT VFR BLD AUTO: 36.3 % (ref 36.5–49.3)
HGB BLD-MCNC: 11 G/DL (ref 12–17)
IMM GRANULOCYTES # BLD AUTO: 0.06 THOUSAND/UL (ref 0–0.2)
IMM GRANULOCYTES NFR BLD AUTO: 1 % (ref 0–2)
LYMPHOCYTES # BLD AUTO: 1.88 THOUSANDS/ÂΜL (ref 0.6–4.47)
LYMPHOCYTES NFR BLD AUTO: 19 % (ref 14–44)
MCH RBC QN AUTO: 25.1 PG (ref 26.8–34.3)
MCHC RBC AUTO-ENTMCNC: 30.3 G/DL (ref 31.4–37.4)
MCV RBC AUTO: 83 FL (ref 82–98)
MONOCYTES # BLD AUTO: 0.72 THOUSAND/ÂΜL (ref 0.17–1.22)
MONOCYTES NFR BLD AUTO: 7 % (ref 4–12)
NEUTROPHILS # BLD AUTO: 6.82 THOUSANDS/ÂΜL (ref 1.85–7.62)
NEUTS SEG NFR BLD AUTO: 68 % (ref 43–75)
NRBC BLD AUTO-RTO: 0 /100 WBCS
PLATELET # BLD AUTO: 361 THOUSANDS/UL (ref 149–390)
PMV BLD AUTO: 9.3 FL (ref 8.9–12.7)
RBC # BLD AUTO: 4.39 MILLION/UL (ref 3.88–5.62)
WBC # BLD AUTO: 9.93 THOUSAND/UL (ref 4.31–10.16)

## 2024-01-31 PROCEDURE — 82948 REAGENT STRIP/BLOOD GLUCOSE: CPT

## 2024-01-31 PROCEDURE — 99232 SBSQ HOSP IP/OBS MODERATE 35: CPT | Performed by: PHYSICIAN ASSISTANT

## 2024-01-31 PROCEDURE — 85025 COMPLETE CBC W/AUTO DIFF WBC: CPT | Performed by: PHYSICIAN ASSISTANT

## 2024-01-31 RX ORDER — HEPARIN SODIUM 5000 [USP'U]/ML
5000 INJECTION, SOLUTION INTRAVENOUS; SUBCUTANEOUS EVERY 8 HOURS SCHEDULED
Status: DISCONTINUED | OUTPATIENT
Start: 2024-01-31 | End: 2024-02-06 | Stop reason: HOSPADM

## 2024-01-31 RX ADMIN — OXYCODONE HYDROCHLORIDE 10 MG: 10 TABLET ORAL at 14:08

## 2024-01-31 RX ADMIN — DOCUSATE SODIUM 100 MG: 100 CAPSULE, LIQUID FILLED ORAL at 18:06

## 2024-01-31 RX ADMIN — ACETAMINOPHEN 325MG 650 MG: 325 TABLET ORAL at 05:55

## 2024-01-31 RX ADMIN — PRAVASTATIN SODIUM 20 MG: 20 TABLET ORAL at 15:30

## 2024-01-31 RX ADMIN — OXYCODONE HYDROCHLORIDE 10 MG: 10 TABLET ORAL at 05:54

## 2024-01-31 RX ADMIN — POLYETHYLENE GLYCOL 3350 17 G: 17 POWDER, FOR SOLUTION ORAL at 08:08

## 2024-01-31 RX ADMIN — DOCUSATE SODIUM 100 MG: 100 CAPSULE, LIQUID FILLED ORAL at 08:08

## 2024-01-31 RX ADMIN — LIDOCAINE 5% 1 PATCH: 700 PATCH TOPICAL at 08:08

## 2024-01-31 RX ADMIN — Medication 3 MG: at 22:13

## 2024-01-31 RX ADMIN — METOPROLOL SUCCINATE 75 MG: 50 TABLET, EXTENDED RELEASE ORAL at 08:30

## 2024-01-31 RX ADMIN — HEPARIN SODIUM 5000 UNITS: 5000 INJECTION INTRAVENOUS; SUBCUTANEOUS at 14:08

## 2024-01-31 RX ADMIN — GABAPENTIN 400 MG: 400 CAPSULE ORAL at 18:06

## 2024-01-31 RX ADMIN — MORPHINE SULFATE 4 MG: 4 INJECTION, SOLUTION INTRAMUSCULAR; INTRAVENOUS at 15:17

## 2024-01-31 RX ADMIN — MIRTAZAPINE 45 MG: 15 TABLET, FILM COATED ORAL at 22:13

## 2024-01-31 RX ADMIN — GABAPENTIN 400 MG: 400 CAPSULE ORAL at 08:08

## 2024-01-31 RX ADMIN — NIFEDIPINE 120 MG: 30 TABLET, EXTENDED RELEASE ORAL at 08:30

## 2024-01-31 RX ADMIN — FOLIC ACID 2000 MCG: 1 TABLET ORAL at 08:09

## 2024-01-31 RX ADMIN — OXYCODONE HYDROCHLORIDE 10 MG: 10 TABLET ORAL at 18:07

## 2024-01-31 RX ADMIN — CLONAZEPAM 1 MG: 1 TABLET ORAL at 08:09

## 2024-01-31 RX ADMIN — LOSARTAN POTASSIUM 100 MG: 50 TABLET, FILM COATED ORAL at 08:30

## 2024-01-31 RX ADMIN — HYDROCHLOROTHIAZIDE 25 MG: 25 TABLET ORAL at 08:30

## 2024-01-31 RX ADMIN — TAMSULOSIN HYDROCHLORIDE 0.4 MG: 0.4 CAPSULE ORAL at 15:30

## 2024-01-31 RX ADMIN — CLONAZEPAM 1 MG: 1 TABLET ORAL at 18:06

## 2024-01-31 RX ADMIN — INSULIN LISPRO 4 UNITS: 100 INJECTION, SOLUTION INTRAVENOUS; SUBCUTANEOUS at 12:08

## 2024-01-31 RX ADMIN — FINASTERIDE 5 MG: 5 TABLET, FILM COATED ORAL at 08:09

## 2024-01-31 RX ADMIN — MORPHINE SULFATE 4 MG: 4 INJECTION, SOLUTION INTRAMUSCULAR; INTRAVENOUS at 19:48

## 2024-01-31 RX ADMIN — CLONAZEPAM 0.5 MG: 0.5 TABLET ORAL at 10:35

## 2024-01-31 RX ADMIN — OXYCODONE HYDROCHLORIDE 10 MG: 10 TABLET ORAL at 22:13

## 2024-01-31 RX ADMIN — SERTRALINE HYDROCHLORIDE 100 MG: 100 TABLET ORAL at 08:09

## 2024-01-31 NOTE — PLAN OF CARE
Problem: Potential for Falls  Goal: Patient will remain free of falls  Description: INTERVENTIONS:  - Educate patient/family on patient safety including physical limitations  - Instruct patient to call for assistance with activity   - Consult OT/PT to assist with strengthening/mobility   - Keep Call bell within reach  - Keep bed low and locked with side rails adjusted as appropriate  - Keep care items and personal belongings within reach  - Initiate and maintain comfort rounds  - Make Fall Risk Sign visible to staff  - Offer Toileting every 2 Hours, in advance of need  - Initiate/Maintain alarm  - Obtain necessary fall risk management equipment:   - Apply yellow socks and bracelet for high fall risk patients  - Consider moving patient to room near nurses station  Outcome: Progressing     Problem: PAIN - ADULT  Goal: Verbalizes/displays adequate comfort level or baseline comfort level  Description: Interventions:  - Encourage patient to monitor pain and request assistance  - Assess pain using appropriate pain scale  - Administer analgesics based on type and severity of pain and evaluate response  - Implement non-pharmacological measures as appropriate and evaluate response  - Consider cultural and social influences on pain and pain management  - Notify physician/advanced practitioner if interventions unsuccessful or patient reports new pain  Outcome: Progressing     Problem: INFECTION - ADULT  Goal: Absence or prevention of progression during hospitalization  Description: INTERVENTIONS:  - Assess and monitor for signs and symptoms of infection  - Monitor lab/diagnostic results  - Monitor all insertion sites, i.e. indwelling lines, tubes, and drains  - Monitor endotracheal if appropriate and nasal secretions for changes in amount and color  - [place] appropriate cooling/warming therapies per order  - Administer medications as ordered  - Instruct and encourage patient and family to use good hand hygiene  technique  - Identify and instruct in appropriate isolation precautions for identified infection/condition  Outcome: Progressing  Goal: Absence of fever/infection during neutropenic period  Description: INTERVENTIONS:  - Monitor WBC    Outcome: Progressing     Problem: SAFETY ADULT  Goal: Patient will remain free of falls  Description: INTERVENTIONS:  - Educate patient/family on patient safety including physical limitations  - Instruct patient to call for assistance with activity   - Consult OT/PT to assist with strengthening/mobility   - Keep Call bell within reach  - Keep bed low and locked with side rails adjusted as appropriate  - Keep care items and personal belongings within reach  - Initiate and maintain comfort rounds  - Make Fall Risk Sign visible to staff  - Offer Toileting every  Hours, in advance of need  - Initiate/Maintain alarm  - Obtain necessary fall risk management equipment: walker  - Apply yellow socks and bracelet for high fall risk patients  - Consider moving patient to room near nurses station  Outcome: Progressing  Goal: Maintain or return to baseline ADL function  Description: INTERVENTIONS:  -  Assess patient's ability to carry out ADLs; assess patient's baseline for ADL function and identify physical deficits which impact ability to perform ADLs (bathing, care of mouth/teeth, toileting, grooming, dressing, etc.)  - Assess/evaluate cause of self-care deficits   - Assess range of motion  - Assess patient's mobility; develop plan if impaired  - Assess patient's need for assistive devices and provide as appropriate  - Encourage maximum independence but intervene and supervise when necessary  - Involve family in performance of ADLs  - Assess for home care needs following discharge   - Consider OT consult to assist with ADL evaluation and planning for discharge  - Provide patient education as appropriate  Outcome: Progressing  Goal: Maintains/Returns to pre admission functional  level  Description: INTERVENTIONS:  - Perform AM-PAC 6 Click Basic Mobility/ Daily Activity assessment daily.  - Set and communicate daily mobility goal to care team and patient/family/caregiver.   - Collaborate with rehabilitation services on mobility goals if consulted  - Perform Range of Motion 3 times a day.  - Reposition patient every 2 hours.  - Dangle patient 3 times a day  - Stand patient 3 times a day  - Ambulate patient 3 times a day  - Out of bed to chair 3 times a day   - Out of bed for meals 3 times a day  - Out of bed for toileting  - Record patient progress and toleration of activity level   Outcome: Progressing     Problem: DISCHARGE PLANNING  Goal: Discharge to home or other facility with appropriate resources  Description: INTERVENTIONS:  - Identify barriers to discharge w/patient and caregiver  - Arrange for needed discharge resources and transportation as appropriate  - Identify discharge learning needs (meds, wound care, etc.)  - Arrange for interpretive services to assist at discharge as needed  - Refer to Case Management Department for coordinating discharge planning if the patient needs post-hospital services based on physician/advanced practitioner order or complex needs related to functional status, cognitive ability, or social support system  Outcome: Progressing     Problem: Knowledge Deficit  Goal: Patient/family/caregiver demonstrates understanding of disease process, treatment plan, medications, and discharge instructions  Description: Complete learning assessment and assess knowledge base.  Interventions:  - Provide teaching at level of understanding  - Provide teaching via preferred learning methods  Outcome: Progressing     Problem: MUSCULOSKELETAL - ADULT  Goal: Maintain or return mobility to safest level of function  Description: INTERVENTIONS:  - Assess patient's ability to carry out ADLs; assess patient's baseline for ADL function and identify physical deficits which impact  ability to perform ADLs (bathing, care of mouth/teeth, toileting, grooming, dressing, etc.)  - Assess/evaluate cause of self-care deficits   - Assess range of motion  - Assess patient's mobility  - Assess patient's need for assistive devices and provide as appropriate  - Encourage maximum independence but intervene and supervise when necessary  - Involve family in performance of ADLs  - Assess for home care needs following discharge   - Consider OT consult to assist with ADL evaluation and planning for discharge  - Provide patient education as appropriate  Outcome: Progressing  Goal: Maintain proper alignment of affected body part  Description: INTERVENTIONS:  - Support, maintain and protect limb and body alignment  - Provide patient/ family with appropriate education  Outcome: Progressing     Problem: Prexisting or High Potential for Compromised Skin Integrity  Goal: Skin integrity is maintained or improved  Description: INTERVENTIONS:  - Identify patients at risk for skin breakdown  - Assess and monitor skin integrity  - Assess and monitor nutrition and hydration status  - Monitor labs   - Assess for incontinence   - Turn and reposition patient  - Assist with mobility/ambulation  - Relieve pressure over bony prominences  - Avoid friction and shearing  - Provide appropriate hygiene as needed including keeping skin clean and dry  - Evaluate need for skin moisturizer/barrier cream  - Collaborate with interdisciplinary team   - Patient/family teaching  - Consider wound care consult   Outcome: Progressing

## 2024-01-31 NOTE — ASSESSMENT & PLAN NOTE
MRI consistent with discitis/osteomyelitis of L1-2, L4-5, L5-S1 regions.  Neurosurgery consulted  Per ER discussion with neurosurgery, obtain blood cultures and hold off on antibiotics pending results unless patient clinically declines  WBC WNL, afebrile  Consider transfer if patient becomes unstable/develops red flag symptoms however exam remains stable  Prior provider discussed with ID - recommend IR consult for possible biopsy  Performed 1/30  Heparin resumed 1/31  Blood cultures negative x2 negative so far

## 2024-01-31 NOTE — PROGRESS NOTES
[facility]  Progress Note  Name: Juan San I  MRN: [id number]  Unit/Bed#: -01 I Date of Admission: 1/24/2024   Date of Service: 1/31/2024 I Hospital Day: 7    Assessment/Plan   * Discitis of lumbosacral region  Assessment & Plan  MRI consistent with discitis/osteomyelitis of L1-2, L4-5, L5-S1 regions.  Neurosurgery consulted  Per ER discussion with neurosurgery, obtain blood cultures and hold off on antibiotics pending results unless patient clinically declines  WBC WNL, afebrile  Consider transfer if patient becomes unstable/develops red flag symptoms however exam remains stable  Prior provider discussed with ID - recommend IR consult for possible biopsy  Performed 1/30  Heparin resumed 1/31  Blood cultures negative x2 negative so far    Herniated nucleus pulposus of lumbosacral region  Assessment & Plan  S/P surgical intervention April 2023    Hypertension  Assessment & Plan  Home regimen includes losartan/HCTZ, metoprolol, nifedipine  Blood pressure stable    Benign prostatic hyperplasia with urinary retention  Assessment & Plan  Continue finasteride and tamsulosin  Urinary retention protocol    Thyroid cancer (HCC)  Assessment & Plan  S/P partial thyroidectomy  Not maintained on levothyroxine - levels have been stable  Follows with Hor[facility] - in remission    DM (diabetes mellitus) (HCC)  Assessment & Plan  Lab Results   Component Value Date    HGBA1C 8.2 (H) 01/24/2024       Recent Labs     01/30/24  1207 01/30/24  1605 01/30/24  2104 01/31/24  0753   POCGLU 150* 113 194* 114         Blood Sugar Average: Last 72 hrs:  (P) 165.8926388493908910  Maintained on metformin and jardiance outpatient.   Hold while admitted, can resume on discharge  SSI with meals while admitted  Current Accu-Cheks and hypoglycemia protocol    Cirrhosis, alcoholic (HCC)  Assessment & Plan  Continue outpatient follow-up  Follows with PCP           VTE Pharmacologic Prophylaxis: VTE Score: 3  Moderate Risk (Score 3-4) - Pharmacological DVT Prophylaxis Ordered: heparin.    Mobility:   Basic Mobility Inpatient Raw Score: 24  JH-HLM Goal: 8: Walk 250 feet or more  JH-HLM Achieved: 8: Walk 250 feet ot more  HLM Goal achieved. Continue to encourage appropriate mobility.    Patient Centered Rounds: I performed bedside rounds with nursing staff today.   Discussions with Specialists or Other Care Team Provider: Appreciate most recent IR  note    Education and Discussions with Family / Patient: Updated  (wife) via phone.    Total Time Spent on Date of Encounter in care of patient: 35 mins. This time was spent on one or more of the following: performing physical exam; counseling and coordination of care; obtaining or reviewing history; documenting in the medical record; reviewing/ordering tests, medications or procedures; communicating with other healthcare professionals and discussing with patient's family/caregivers.    Current Length of Stay: 7 day(s)  Current Patient Status: Inpatient   Certification Statement: The patient will continue to require additional inpatient hospital stay due to pending biopsy results, ongoing ID recommendations  Discharge Plan:  Dependent upon biopsy results    Code Status: Level 1 - Full Code    Subjective:   Patient reports pain is 7 out of 10, sitting upright, comfortably eating breakfast.  He is anxious to leave the hospital, discussed he will need a biopsy results prior to discharge, may be on long term antibiotics.    Objective:     Vitals:   Temp (24hrs), Av °F (36.7 °C), Min:97.9 °F (36.6 °C), Max:98.1 °F (36.7 °C)    Temp:  [97.9 °F (36.6 °C)-98.1 °F (36.7 °C)] 97.9 °F (36.6 °C)  HR:  [] 112  Resp:  [14-19] 17  BP: ()/(46-85) 139/72  SpO2:  [94 %-100 %] 99 %  Body mass index is 26.58 kg/m².     Input and Output Summary (last 24 hours):   No intake or output data in the 24 hours ending 24 1141    Physical Exam:   Physical Exam  Vitals and  nursing note reviewed.   Constitutional:       General: He is not in acute distress.     Appearance: Normal appearance. He is well-developed.   HENT:      Head: Normocephalic and atraumatic.   Eyes:      General: No scleral icterus.     Conjunctiva/sclera: Conjunctivae normal.   Cardiovascular:      Rate and Rhythm: Normal rate and regular rhythm.      Heart sounds: No murmur heard.  Pulmonary:      Effort: Pulmonary effort is normal.      Breath sounds: No wheezing, rhonchi or rales.   Abdominal:      General: There is no distension.      Palpations: Abdomen is soft.   Skin:     General: Skin is warm and dry.   Neurological:      General: No focal deficit present.      Mental Status: He is alert.   Psychiatric:         Mood and Affect: Mood normal.        Additional Data:     Labs:  Results from last 7 days   Lab Units 01/31/24  0553   WBC Thousand/uL 9.93   HEMOGLOBIN g/dL 11.0*   HEMATOCRIT % 36.3*   PLATELETS Thousands/uL 361   NEUTROS PCT % 68   LYMPHS PCT % 19   MONOS PCT % 7   EOS PCT % 4     Results from last 7 days   Lab Units 01/30/24  0335   SODIUM mmol/L 133*   POTASSIUM mmol/L 3.8   CHLORIDE mmol/L 96   CO2 mmol/L 28   BUN mg/dL 21   CREATININE mg/dL 0.86   ANION GAP mmol/L 9   CALCIUM mg/dL 9.5   GLUCOSE RANDOM mg/dL 99     Results from last 7 days   Lab Units 01/27/24  0031   INR  0.94     Results from last 7 days   Lab Units 01/31/24  1120 01/31/24  0753 01/30/24  2104 01/30/24  1605 01/30/24  1207 01/30/24  0809 01/29/24  2022 01/29/24  1612 01/29/24  1058 01/29/24  0749 01/28/24  2040 01/28/24  1552   POC GLUCOSE mg/dl 279* 114 194* 113 150* 162* 200* 143* 195* 197* 114 177*     Results from last 7 days   Lab Units 01/24/24  1519   HEMOGLOBIN A1C % 8.2*     Results from last 7 days   Lab Units 01/24/24  1516   LACTIC ACID mmol/L 1.6       Lines/Drains:  Invasive Devices       Peripheral Intravenous Line  Duration             Peripheral IV 01/29/24 Left;Ventral (anterior) Forearm 2 days                           Imaging: Reviewed radiology reports from this admission including: procedure reports    Recent Cultures (last 7 days):   Results from last 7 days   Lab Units 01/24/24  1516   BLOOD CULTURE  No Growth After 5 Days.  No Growth After 5 Days.       Last 24 Hours Medication List:   Current Facility-Administered Medications   Medication Dose Route Frequency Provider Last Rate    acetaminophen  650 mg Oral Q6H PRN Sveta Smith, DANILE      aluminum-magnesium hydroxide-simethicone  30 mL Oral Q6H PRN Sveta Smith, DANIEL      clonazePAM  0.5 mg Oral Daily PRN Tiffany Cobb MD      clonazePAM  1 mg Oral BID Tiffany Cobb MD      docusate sodium  100 mg Oral BID Sailaja Salcedo PA-C      finasteride  5 mg Oral Daily Sveta Smith, DANIEL      fluticasone  1 spray Nasal Daily PRN Sveta Smith PA-C      folic acid  2,000 mcg Oral Daily Sveta Smith, DANIEL      gabapentin  400 mg Oral BID Sveta Smith PA-C      heparin (porcine)  5,000 Units Subcutaneous Q8H CHRISTIE Sveta Smith PA-C      losartan  100 mg Oral Daily Adelita Marina, DO      And    hydroCHLOROthiazide  25 mg Oral Daily Adelita Marina, DO      insulin lispro  1-6 Units Subcutaneous TID AC Sveta Smith PA-C      insulin lispro  1-6 Units Subcutaneous HS Sveta Smith, DANIEL      lidocaine  1 patch Topical Daily Sveta Smith PA-C      melatonin  3 mg Oral HS Sveta Smith PA-C      methocarbamol  750 mg Oral Q8H PRN Sveta Smith PA-C      metoprolol succinate  75 mg Oral QAM Sveta Smith PA-C      mirtazapine  45 mg Oral HS Sveta Smith, DANIEL      morphine injection  4 mg Intravenous Q4H PRN Sveta Smith, DANIEL      NIFEdipine  120 mg Oral Daily Adelita Marina, DO      ondansetron  4 mg Intravenous Q6H PRN Sveta Smith, DANIEL      oxyCODONE  10 mg Oral Q4H PRN Sveta Smith, DANIEL      oxyCODONE  5 mg Oral Q4H PRN Sveta Smith, DANIEL      polyethylene glycol  17 g Oral Daily Sailaja Salcedo PA-C      pravastatin  20 mg Oral Daily With Dinner Sveta Smith PA-C      senna  2 tablet Oral HS Sailaja Salcedo, DANIEL      sertraline  100  mg Oral Daily Adelita Marina DO      tamsulosin  0.4 mg Oral Daily With Dinner Sveta Smith PA-C          Today, Patient Was Seen By: Sveta Smith PA-C    **Please Note: This note may have been constructed using a voice recognition system.**

## 2024-01-31 NOTE — ASSESSMENT & PLAN NOTE
Lab Results   Component Value Date    HGBA1C 8.2 (H) 01/24/2024       Recent Labs     01/30/24  1207 01/30/24  1605 01/30/24  2104 01/31/24  0753   POCGLU 150* 113 194* 114         Blood Sugar Average: Last 72 hrs:  (P) 165.0964388132838464  Maintained on metformin and jardiance outpatient.   Hold while admitted, can resume on discharge  SSI with meals while admitted  Current Accu-Cheks and hypoglycemia protocol

## 2024-01-31 NOTE — PLAN OF CARE
Problem: Potential for Falls  Goal: Patient will remain free of falls  Description: INTERVENTIONS:  - Educate patient/family on patient safety including physical limitations  - Instruct patient to call for assistance with activity   - Consult OT/PT to assist with strengthening/mobility   - Keep Call bell within reach  - Keep bed low and locked with side rails adjusted as appropriate  - Keep care items and personal belongings within reach  - Initiate and maintain comfort rounds  - Make Fall Risk Sign visible to staff  - Offer Toileting every 2 Hours, in advance of need  - Initiate/Maintain alarm  - Obtain necessary fall risk management equipment:   - Apply yellow socks and bracelet for high fall risk patients  - Consider moving patient to room near nurses station  Outcome: Progressing     Problem: PAIN - ADULT  Goal: Verbalizes/displays adequate comfort level or baseline comfort level  Description: Interventions:  - Encourage patient to monitor pain and request assistance  - Assess pain using appropriate pain scale  - Administer analgesics based on type and severity of pain and evaluate response  - Implement non-pharmacological measures as appropriate and evaluate response  - Consider cultural and social influences on pain and pain management  - Notify physician/advanced practitioner if interventions unsuccessful or patient reports new pain  Outcome: Progressing     Problem: INFECTION - ADULT  Goal: Absence or prevention of progression during hospitalization  Description: INTERVENTIONS:  - Assess and monitor for signs and symptoms of infection  - Monitor lab/diagnostic results  - Monitor all insertion sites, i.e. indwelling lines, tubes, and drains  - Monitor endotracheal if appropriate and nasal secretions for changes in amount and color  - [place] appropriate cooling/warming therapies per order  - Administer medications as ordered  - Instruct and encourage patient and family to use good hand hygiene  technique  - Identify and instruct in appropriate isolation precautions for identified infection/condition  Outcome: Progressing  Goal: Absence of fever/infection during neutropenic period  Description: INTERVENTIONS:  - Monitor WBC    Outcome: Progressing     Problem: SAFETY ADULT  Goal: Patient will remain free of falls  Description: INTERVENTIONS:  - Educate patient/family on patient safety including physical limitations  - Instruct patient to call for assistance with activity   - Consult OT/PT to assist with strengthening/mobility   - Keep Call bell within reach  - Keep bed low and locked with side rails adjusted as appropriate  - Keep care items and personal belongings within reach  - Initiate and maintain comfort rounds  - Make Fall Risk Sign visible to staff  - Offer Toileting every 2 Hours, in advance of need  - Initiate/Maintain alarm  - Obtain necessary fall risk management equipment:   - Apply yellow socks and bracelet for high fall risk patients  - Consider moving patient to room near nurses station  Outcome: Progressing  Goal: Maintain or return to baseline ADL function  Description: INTERVENTIONS:  -  Assess patient's ability to carry out ADLs; assess patient's baseline for ADL function and identify physical deficits which impact ability to perform ADLs (bathing, care of mouth/teeth, toileting, grooming, dressing, etc.)  - Assess/evaluate cause of self-care deficits   - Assess range of motion  - Assess patient's mobility; develop plan if impaired  - Assess patient's need for assistive devices and provide as appropriate  - Encourage maximum independence but intervene and supervise when necessary  - Involve family in performance of ADLs  - Assess for home care needs following discharge   - Consider OT consult to assist with ADL evaluation and planning for discharge  - Provide patient education as appropriate  Outcome: Progressing  Goal: Maintains/Returns to pre admission functional level  Description:  INTERVENTIONS:  - Perform AM-PAC 6 Click Basic Mobility/ Daily Activity assessment daily.  - Set and communicate daily mobility goal to care team and patient/family/caregiver.   - Collaborate with rehabilitation services on mobility goals if consulted  - Perform Range of Motion 3 times a day.  - Reposition patient every 2 hours.  - Dangle patient 3 times a day  - Stand patient 3 times a day  - Ambulate patient 3 times a day  - Out of bed to chair 3 times a day   - Out of bed for meals 3 times a day  - Out of bed for toileting  - Record patient progress and toleration of activity level   Outcome: Progressing     Problem: DISCHARGE PLANNING  Goal: Discharge to home or other facility with appropriate resources  Description: INTERVENTIONS:  - Identify barriers to discharge w/patient and caregiver  - Arrange for needed discharge resources and transportation as appropriate  - Identify discharge learning needs (meds, wound care, etc.)  - Arrange for interpretive services to assist at discharge as needed  - Refer to Case Management Department for coordinating discharge planning if the patient needs post-hospital services based on physician/advanced practitioner order or complex needs related to functional status, cognitive ability, or social support system  Outcome: Progressing     Problem: Knowledge Deficit  Goal: Patient/family/caregiver demonstrates understanding of disease process, treatment plan, medications, and discharge instructions  Description: Complete learning assessment and assess knowledge base.  Interventions:  - Provide teaching at level of understanding  - Provide teaching via preferred learning methods  Outcome: Progressing     Problem: MUSCULOSKELETAL - ADULT  Goal: Maintain or return mobility to safest level of function  Description: INTERVENTIONS:  - Assess patient's ability to carry out ADLs; assess patient's baseline for ADL function and identify physical deficits which impact ability to perform ADLs  (bathing, care of mouth/teeth, toileting, grooming, dressing, etc.)  - Assess/evaluate cause of self-care deficits   - Assess range of motion  - Assess patient's mobility  - Assess patient's need for assistive devices and provide as appropriate  - Encourage maximum independence but intervene and supervise when necessary  - Involve family in performance of ADLs  - Assess for home care needs following discharge   - Consider OT consult to assist with ADL evaluation and planning for discharge  - Provide patient education as appropriate  Outcome: Progressing  Goal: Maintain proper alignment of affected body part  Description: INTERVENTIONS:  - Support, maintain and protect limb and body alignment  - Provide patient/ family with appropriate education  Outcome: Progressing     Problem: Prexisting or High Potential for Compromised Skin Integrity  Goal: Skin integrity is maintained or improved  Description: INTERVENTIONS:  - Identify patients at risk for skin breakdown  - Assess and monitor skin integrity  - Assess and monitor nutrition and hydration status  - Monitor labs   - Assess for incontinence   - Turn and reposition patient  - Assist with mobility/ambulation  - Relieve pressure over bony prominences  - Avoid friction and shearing  - Provide appropriate hygiene as needed including keeping skin clean and dry  - Evaluate need for skin moisturizer/barrier cream  - Collaborate with interdisciplinary team   - Patient/family teaching  - Consider wound care consult   Outcome: Progressing

## 2024-01-31 NOTE — PHYSICAL THERAPY NOTE
Physical Therapy Screen    Patient Name: Juan San    Today's Date: 1/31/2024     Problem List  Principal Problem:    Discitis of lumbosacral region  Active Problems:    Cirrhosis, alcoholic (HCC)    DM (diabetes mellitus) (HCC)    Herniated nucleus pulposus of lumbosacral region    Thyroid cancer (HCC)    Benign prostatic hyperplasia with urinary retention    Hypertension       Past Medical History  Past Medical History:   Diagnosis Date    Anxiety     Arthritis     Cancer (HCC)     Depression     Diabetes mellitus (HCC)     Hypertension     Liver disease     Mitral valve prolapse     Peripheral neuropathy     Neuropathy    PONV (postoperative nausea and vomiting)     Thyroid disease         Past Surgical History  Past Surgical History:   Procedure Laterality Date    COLONOSCOPY      INCISION AND DRAINAGE OF WOUND Left 08/12/2022    Procedure: INCISION AND DRAINAGE (I&D) EXTREMITY;  Surgeon: Moustapha Cote DPM;  Location:  MAIN OR;  Service: Podiatry    IR BIOPSY SPINE  1/30/2024    JOINT REPLACEMENT Left 03/11/2022    Left TSA    [place] ARTHRODESIS POSTERIOR/PSTLAT TQ 1NTRSPC LUMBAR Bilateral 04/11/2023    Procedure: L1-S1 navigated posterior decompression with instrumented fixation fusion;  Surgeon: James Moraes MD;  Location:  MAIN OR;  Service: Neurosurgery    THYROID SURGERY  2021    remove cancer        01/31/24 1300   PT Last Visit   PT Visit Date 01/31/24   Note Type   Note type Screen   Additional Comments Chart review completed. Patient is a 24 on the [facility] and has achieved ambulating 240ft independently. No further inpatient P.T. needs. Will sign off and discharge orders.     Soha Page

## 2024-01-31 NOTE — CASE MANAGEMENT
Case Management Discharge Planning Note    Patient name Juan San  Location /-01 MRN [id number]  : 1955 Date 2024       Current Admission Date: 2024  Current Admission Diagnosis:Discitis of lumbosacral region   Patient Active Problem List    Diagnosis Date Noted    Foraminal stenosis of lumbar region 2024    Discitis of lumbosacral region 2024    Hypochromic microcytic anemia 10/10/2023    Fall 10/09/2023    Chronic bilateral low back pain with sciatica 10/09/2023    Anxiety and depression 10/09/2023    Hypertension 10/09/2023    Benign prostatic hyperplasia with urinary retention 2023    Scrotal pain 2023    Lower urinary tract symptoms 2023    Status post lumbar spinal fusion 2023    Hyponatremia 2023    Gallstones 2023    Anemia 2023    Urinary retention 2023    PONV (postoperative nausea and vomiting)     Medical marijuana use     Chronic bilateral low back pain without sciatica 2023    Lumbar radiculopathy     Chronic pain syndrome 2022    Liver disease 08/10/2022    Bronchitis, mucopurulent recurrent (HCC) 2022    Cirrhosis, alcoholic (HCC) 2022    Diabetic peripheral neuropathy (HCC) 2022    Herniated nucleus pulposus of lumbosacral region 2022    Thyroid cancer (HCC) 2021    Alcoholism in remission (HCC) 2021    Benzodiazepine dependence (HCC) 2021    Bilateral adhesive capsulitis of shoulders 2021    DM (diabetes mellitus) (HCC) 2021    Hypercholesterolemia 2021    Lumbar spondylosis 2021      LOS (days): 7  Geometric Mean LOS (GMLOS) (days): 2.9  Days to GMLOS:-4.1     OBJECTIVE:  Risk of Unplanned Readmission Score: 39.84         Current admission status: Inpatient   Preferred Pharmacy:   [facility] [address] [address]  Phone: [phone] Fax:  [phone]    Primary Care Provider: Sabra Magaña DO    Primary Insurance: [employer] RE[employer]  Secondary Insurance: [facility]    DISCHARGE DETAILS:        Continuing to follow patient. Spoke with patient's daughter, Shahla at [phone] and she is concerned about patient's pain control as he get fogetful. Notified Vanna ,patient's nurse of the above.  Was also informed patient resides in a 2nd floor apt and must be able to climb 19 steps to enter. He will be moving with his wife to a one level home on Feb 24 but until then has to be able to climb stairs

## 2024-01-31 NOTE — PROGRESS NOTES
24 1300   Clinical Encounter Type   Visited With Family   Routine Visit Follow-up   Catholic Encounters   Catholic Needs Prayer      Pastoral Care Progress Note    2024  Patient: Juan San : 1955  Admission Date & Time: 2024 0203  MRN: [id number] CSN: [id number]           follow-up visit. Patient said he was doing well and asked for continued prayers. He was eating lunch but I will swing back and offer a prayer. Chaplains remain available. Addition: Juan and I spoke at length about grief, carli and how God is working in his life. We were able to define some miracle moments and identify that he is indeed moving through grief in a healthy manner, at least with what he shared with me.

## 2024-01-31 NOTE — ASSESSMENT & PLAN NOTE
S/P partial thyroidectomy  Not maintained on levothyroxine - levels have been stable  Follows with [place] - in remission

## 2024-02-01 ENCOUNTER — APPOINTMENT (INPATIENT)
Dept: INTERVENTIONAL RADIOLOGY/VASCULAR | Facility: HOSPITAL | Age: 69
DRG: 552 | End: 2024-02-01
Attending: RADIOLOGY
Payer: COMMERCIAL

## 2024-02-01 ENCOUNTER — ANESTHESIA (INPATIENT)
Dept: INTERVENTIONAL RADIOLOGY/VASCULAR | Facility: HOSPITAL | Age: 69
DRG: 552 | End: 2024-02-01
Payer: COMMERCIAL

## 2024-02-01 LAB
BASOPHILS # BLD AUTO: 0.08 THOUSANDS/ÂΜL (ref 0–0.1)
BASOPHILS NFR BLD AUTO: 1 % (ref 0–1)
EOSINOPHIL # BLD AUTO: 0.32 THOUSAND/ÂΜL (ref 0–0.61)
EOSINOPHIL NFR BLD AUTO: 4 % (ref 0–6)
ERYTHROCYTE [DISTWIDTH] IN BLOOD BY AUTOMATED COUNT: 16.5 % (ref 11.6–15.1)
GLUCOSE SERPL-MCNC: 123 MG/DL (ref 65–140)
GLUCOSE SERPL-MCNC: 125 MG/DL (ref 65–140)
GLUCOSE SERPL-MCNC: 165 MG/DL (ref 65–140)
GLUCOSE SERPL-MCNC: 243 MG/DL (ref 65–140)
HCT VFR BLD AUTO: 34.5 % (ref 36.5–49.3)
HGB BLD-MCNC: 10.5 G/DL (ref 12–17)
IMM GRANULOCYTES # BLD AUTO: 0.02 THOUSAND/UL (ref 0–0.2)
IMM GRANULOCYTES NFR BLD AUTO: 0 % (ref 0–2)
LYMPHOCYTES # BLD AUTO: 2 THOUSANDS/ÂΜL (ref 0.6–4.47)
LYMPHOCYTES NFR BLD AUTO: 27 % (ref 14–44)
MCH RBC QN AUTO: 25.4 PG (ref 26.8–34.3)
MCHC RBC AUTO-ENTMCNC: 30.4 G/DL (ref 31.4–37.4)
MCV RBC AUTO: 84 FL (ref 82–98)
MONOCYTES # BLD AUTO: 0.59 THOUSAND/ÂΜL (ref 0.17–1.22)
MONOCYTES NFR BLD AUTO: 8 % (ref 4–12)
NEUTROPHILS # BLD AUTO: 4.37 THOUSANDS/ÂΜL (ref 1.85–7.62)
NEUTS SEG NFR BLD AUTO: 60 % (ref 43–75)
NRBC BLD AUTO-RTO: 0 /100 WBCS
PLATELET # BLD AUTO: 343 THOUSANDS/UL (ref 149–390)
PMV BLD AUTO: 9.6 FL (ref 8.9–12.7)
RBC # BLD AUTO: 4.13 MILLION/UL (ref 3.88–5.62)
WBC # BLD AUTO: 7.38 THOUSAND/UL (ref 4.31–10.16)

## 2024-02-01 PROCEDURE — 62267 INTERDISCAL PERQ ASPIR DX: CPT | Performed by: RADIOLOGY

## 2024-02-01 PROCEDURE — 87205 SMEAR GRAM STAIN: CPT | Performed by: PHYSICIAN ASSISTANT

## 2024-02-01 PROCEDURE — NC001 PR NO CHARGE: Performed by: RADIOLOGY

## 2024-02-01 PROCEDURE — 82948 REAGENT STRIP/BLOOD GLUCOSE: CPT

## 2024-02-01 PROCEDURE — 20220 BONE BIOPSY TROCAR/NDL SUPFC: CPT

## 2024-02-01 PROCEDURE — C1830 POWER BONE MARROW BX NEEDLE: HCPCS

## 2024-02-01 PROCEDURE — 99232 SBSQ HOSP IP/OBS MODERATE 35: CPT | Performed by: PHYSICIAN ASSISTANT

## 2024-02-01 PROCEDURE — 85025 COMPLETE CBC W/AUTO DIFF WBC: CPT | Performed by: PHYSICIAN ASSISTANT

## 2024-02-01 PROCEDURE — 0S943ZX DRAINAGE OF LUMBOSACRAL DISC, PERCUTANEOUS APPROACH, DIAGNOSTIC: ICD-10-PCS | Performed by: INTERNAL MEDICINE

## 2024-02-01 PROCEDURE — 87075 CULTR BACTERIA EXCEPT BLOOD: CPT | Performed by: PHYSICIAN ASSISTANT

## 2024-02-01 PROCEDURE — 87070 CULTURE OTHR SPECIMN AEROBIC: CPT | Performed by: PHYSICIAN ASSISTANT

## 2024-02-01 PROCEDURE — 77003 FLUOROGUIDE FOR SPINE INJECT: CPT | Performed by: RADIOLOGY

## 2024-02-01 RX ORDER — SODIUM CHLORIDE, SODIUM LACTATE, POTASSIUM CHLORIDE, CALCIUM CHLORIDE 600; 310; 30; 20 MG/100ML; MG/100ML; MG/100ML; MG/100ML
10 INJECTION, SOLUTION INTRAVENOUS CONTINUOUS
Status: DISCONTINUED | OUTPATIENT
Start: 2024-02-01 | End: 2024-02-04

## 2024-02-01 RX ORDER — FENTANYL CITRATE 50 UG/ML
INJECTION, SOLUTION INTRAMUSCULAR; INTRAVENOUS AS NEEDED
Status: DISCONTINUED | OUTPATIENT
Start: 2024-02-01 | End: 2024-02-01

## 2024-02-01 RX ORDER — LIDOCAINE HYDROCHLORIDE 10 MG/ML
INJECTION, SOLUTION EPIDURAL; INFILTRATION; INTRACAUDAL; PERINEURAL AS NEEDED
Status: DISCONTINUED | OUTPATIENT
Start: 2024-02-01 | End: 2024-02-01

## 2024-02-01 RX ORDER — PROPOFOL 10 MG/ML
INJECTION, EMULSION INTRAVENOUS AS NEEDED
Status: DISCONTINUED | OUTPATIENT
Start: 2024-02-01 | End: 2024-02-01

## 2024-02-01 RX ORDER — LIDOCAINE HYDROCHLORIDE 10 MG/ML
INJECTION, SOLUTION EPIDURAL; INFILTRATION; INTRACAUDAL; PERINEURAL AS NEEDED
Status: COMPLETED | OUTPATIENT
Start: 2024-02-01 | End: 2024-02-01

## 2024-02-01 RX ORDER — DEXAMETHASONE SODIUM PHOSPHATE 10 MG/ML
INJECTION, SOLUTION INTRAMUSCULAR; INTRAVENOUS AS NEEDED
Status: DISCONTINUED | OUTPATIENT
Start: 2024-02-01 | End: 2024-02-01

## 2024-02-01 RX ORDER — FENTANYL CITRATE/PF 50 MCG/ML
25 SYRINGE (ML) INJECTION
Status: DISCONTINUED | OUTPATIENT
Start: 2024-02-01 | End: 2024-02-06 | Stop reason: HOSPADM

## 2024-02-01 RX ORDER — SODIUM CHLORIDE, SODIUM LACTATE, POTASSIUM CHLORIDE, CALCIUM CHLORIDE 600; 310; 30; 20 MG/100ML; MG/100ML; MG/100ML; MG/100ML
INJECTION, SOLUTION INTRAVENOUS CONTINUOUS PRN
Status: DISCONTINUED | OUTPATIENT
Start: 2024-02-01 | End: 2024-02-01

## 2024-02-01 RX ORDER — SUCCINYLCHOLINE/SOD CL,ISO/PF 100 MG/5ML
SYRINGE (ML) INTRAVENOUS AS NEEDED
Status: DISCONTINUED | OUTPATIENT
Start: 2024-02-01 | End: 2024-02-01

## 2024-02-01 RX ORDER — ONDANSETRON 2 MG/ML
INJECTION INTRAMUSCULAR; INTRAVENOUS AS NEEDED
Status: DISCONTINUED | OUTPATIENT
Start: 2024-02-01 | End: 2024-02-01

## 2024-02-01 RX ADMIN — ONDANSETRON 4 MG: 2 INJECTION INTRAMUSCULAR; INTRAVENOUS at 14:54

## 2024-02-01 RX ADMIN — FENTANYL CITRATE 50 MCG: 50 INJECTION, SOLUTION INTRAMUSCULAR; INTRAVENOUS at 14:21

## 2024-02-01 RX ADMIN — OXYCODONE HYDROCHLORIDE 10 MG: 10 TABLET ORAL at 02:40

## 2024-02-01 RX ADMIN — MORPHINE SULFATE 4 MG: 4 INJECTION, SOLUTION INTRAMUSCULAR; INTRAVENOUS at 09:03

## 2024-02-01 RX ADMIN — LIDOCAINE HYDROCHLORIDE 50 MG: 10 INJECTION, SOLUTION EPIDURAL; INFILTRATION; INTRACAUDAL; PERINEURAL at 14:21

## 2024-02-01 RX ADMIN — MIRTAZAPINE 45 MG: 15 TABLET, FILM COATED ORAL at 21:22

## 2024-02-01 RX ADMIN — CLONAZEPAM 1 MG: 1 TABLET ORAL at 09:05

## 2024-02-01 RX ADMIN — ACETAMINOPHEN 325MG 650 MG: 325 TABLET ORAL at 02:40

## 2024-02-01 RX ADMIN — NIFEDIPINE 120 MG: 30 TABLET, EXTENDED RELEASE ORAL at 09:05

## 2024-02-01 RX ADMIN — INSULIN LISPRO 1 UNITS: 100 INJECTION, SOLUTION INTRAVENOUS; SUBCUTANEOUS at 16:33

## 2024-02-01 RX ADMIN — Medication 100 MG: at 14:21

## 2024-02-01 RX ADMIN — METOPROLOL SUCCINATE 75 MG: 50 TABLET, EXTENDED RELEASE ORAL at 09:05

## 2024-02-01 RX ADMIN — TAMSULOSIN HYDROCHLORIDE 0.4 MG: 0.4 CAPSULE ORAL at 16:20

## 2024-02-01 RX ADMIN — OXYCODONE HYDROCHLORIDE 10 MG: 10 TABLET ORAL at 10:53

## 2024-02-01 RX ADMIN — PRAVASTATIN SODIUM 20 MG: 20 TABLET ORAL at 16:20

## 2024-02-01 RX ADMIN — SODIUM CHLORIDE, SODIUM LACTATE, POTASSIUM CHLORIDE, AND CALCIUM CHLORIDE: .6; .31; .03; .02 INJECTION, SOLUTION INTRAVENOUS at 14:00

## 2024-02-01 RX ADMIN — FENTANYL CITRATE 25 MCG: 50 INJECTION, SOLUTION INTRAMUSCULAR; INTRAVENOUS at 15:24

## 2024-02-01 RX ADMIN — Medication 3 MG: at 21:21

## 2024-02-01 RX ADMIN — HYDROCHLOROTHIAZIDE 25 MG: 25 TABLET ORAL at 09:05

## 2024-02-01 RX ADMIN — SODIUM CHLORIDE, SODIUM LACTATE, POTASSIUM CHLORIDE, AND CALCIUM CHLORIDE 10 ML/HR: .6; .31; .03; .02 INJECTION, SOLUTION INTRAVENOUS at 16:33

## 2024-02-01 RX ADMIN — FOLIC ACID 2000 MCG: 1 TABLET ORAL at 09:05

## 2024-02-01 RX ADMIN — METHOCARBAMOL TABLETS 750 MG: 500 TABLET, COATED ORAL at 10:53

## 2024-02-01 RX ADMIN — HEPARIN SODIUM 5000 UNITS: 5000 INJECTION INTRAVENOUS; SUBCUTANEOUS at 21:21

## 2024-02-01 RX ADMIN — PROPOFOL 200 MG: 10 INJECTION, EMULSION INTRAVENOUS at 14:21

## 2024-02-01 RX ADMIN — OXYCODONE HYDROCHLORIDE 10 MG: 10 TABLET ORAL at 06:39

## 2024-02-01 RX ADMIN — MORPHINE SULFATE 4 MG: 4 INJECTION, SOLUTION INTRAMUSCULAR; INTRAVENOUS at 00:25

## 2024-02-01 RX ADMIN — FENTANYL CITRATE 25 MCG: 50 INJECTION, SOLUTION INTRAMUSCULAR; INTRAVENOUS at 15:19

## 2024-02-01 RX ADMIN — MORPHINE SULFATE 4 MG: 4 INJECTION, SOLUTION INTRAMUSCULAR; INTRAVENOUS at 04:30

## 2024-02-01 RX ADMIN — OXYCODONE HYDROCHLORIDE 10 MG: 10 TABLET ORAL at 20:05

## 2024-02-01 RX ADMIN — INSULIN LISPRO 3 UNITS: 100 INJECTION, SOLUTION INTRAVENOUS; SUBCUTANEOUS at 21:21

## 2024-02-01 RX ADMIN — OXYCODONE HYDROCHLORIDE 10 MG: 10 TABLET ORAL at 16:20

## 2024-02-01 RX ADMIN — METHOCARBAMOL TABLETS 750 MG: 500 TABLET, COATED ORAL at 20:05

## 2024-02-01 RX ADMIN — LOSARTAN POTASSIUM 100 MG: 50 TABLET, FILM COATED ORAL at 09:05

## 2024-02-01 RX ADMIN — DOCUSATE SODIUM 100 MG: 100 CAPSULE, LIQUID FILLED ORAL at 18:23

## 2024-02-01 RX ADMIN — LIDOCAINE 5% 1 PATCH: 700 PATCH TOPICAL at 09:05

## 2024-02-01 RX ADMIN — CLONAZEPAM 1 MG: 1 TABLET ORAL at 18:23

## 2024-02-01 RX ADMIN — DEXAMETHASONE SODIUM PHOSPHATE 10 MG: 10 INJECTION, SOLUTION INTRAMUSCULAR; INTRAVENOUS at 14:40

## 2024-02-01 RX ADMIN — GABAPENTIN 400 MG: 400 CAPSULE ORAL at 09:05

## 2024-02-01 RX ADMIN — LIDOCAINE HYDROCHLORIDE 5 ML: 10 INJECTION, SOLUTION EPIDURAL; INFILTRATION; INTRACAUDAL; PERINEURAL at 14:41

## 2024-02-01 RX ADMIN — DOCUSATE SODIUM 100 MG: 100 CAPSULE, LIQUID FILLED ORAL at 09:05

## 2024-02-01 RX ADMIN — SERTRALINE HYDROCHLORIDE 100 MG: 100 TABLET ORAL at 09:05

## 2024-02-01 RX ADMIN — GABAPENTIN 400 MG: 400 CAPSULE ORAL at 18:23

## 2024-02-01 RX ADMIN — FINASTERIDE 5 MG: 5 TABLET, FILM COATED ORAL at 09:05

## 2024-02-01 RX ADMIN — ACETAMINOPHEN 325MG 650 MG: 325 TABLET ORAL at 21:22

## 2024-02-01 RX ADMIN — SENNOSIDES 17.2 MG: 8.6 TABLET, FILM COATED ORAL at 21:21

## 2024-02-01 NOTE — PROGRESS NOTES
Pastoral Care Progress Note    2024  Patient: Juan San : 1955  Admission Date & Time: 2024 0203  MRN: [id number] [facility]: [id number]       24 1100   Clinical Encounter Type   Visited With Patient   Routine Visit Follow-up   Referral From    Referral To    Anglican Encounters   Anglican Needs Prayer;Literature        Intern followed-up with pt based on  referral.  Intern asked pt how he was feeling today. Pt expressed that he had trouble sleeping due to the pain he is in.  Intern provided pt with a resource on grief and asked if the pt wanted to talk more about it. Pt recounted all of the loss he has had in the past few years and how he has been feeling emotionally. Pt reported that talking about his grief provides some catharsis for a period of time. Pt also recounted the story of when he accepted Gu[place] Trung into his heart and how that made him feel.  Intern prayed with the pt before leaving and reminded him on how to contact chaplains if needed. Spiritual care remains available.               Chaplaincy Interventions Utilized:   Empowerment: Encouraged self-care    Exploration: Explored emotional needs & resources, Explored spiritual needs & resources, Facilitated life review, Facilitated story telling, and Identified, evaluated & reinforced appropriate coping strategies    Collaboration: Facilitated respect for spiritual/cultural practice during hospitalization    Relationship Building: Cultivated a relationship of care and support, Listened empathically, Hospitality, and Provided silent and supportive presence    Ritual: Provided prayer    Chaplaincy Outcomes Achieved:  Catharsis, Distress reduced, Expressed gratitude, Identified meaningful connections, Spiritual resources utilized, and Verbally processed emotions    Spiritual Coping Strategies Utilized:   Spiritual practices and Spiritual comfort

## 2024-02-01 NOTE — OCCUPATIONAL THERAPY NOTE
Occupational Therapy Screen    Patient Name: Juan San  Today's Date: 2/1/2024 02/01/24 1421   OT Last Visit   OT Visit Date 02/01/24   Note Type   Note type Screen;Cancelled Session   Cancel Reasons Other   Additional Comments Pt on OT caseload. Attempted to see pt this PM. Pt CONCEPCIÓN to IR for biopsy. Per RN, pt has been independent to the bathroom for self care. Pt with no ongoing OT needs at this time; reconsult if OT needs arise.     Diana Wen MS, OTR/L

## 2024-02-01 NOTE — BRIEF OP NOTE (RAD/CATH)
INTERVENTIONAL RADIOLOGY PROCEDURE NOTE    Date: 2/1/2024    Procedure: L5-S1 disc space biopsy  Procedure Summary       Date:  Room / Location:     Anesthesia Start:  Anesthesia Stop:     Procedure:  Diagnosis:     Scheduled Providers:  Responsible Provider:     Anesthesia Type: Not recorded ASA Status: Not recorded            Preoperative diagnosis:   1. Acute exacerbation of chronic low back pain    2. Discitis    3. Osteomyelitis (HCC)    4. Discitis of lumbosacral region    5. Foraminal stenosis of lumbar region    6. Type 2 diabetes mellitus with hyperglycemia, without long-term current use of insulin (HCC)         Postoperative diagnosis: Same.    Surgeon: Humble Smith MD     Assistant: None. No qualified resident was available.    Blood loss: None    Specimens: 1 core needle samples placed into sterile cup for cultures.     Findings: Successful L5-S1 disc space biopsy.    Complications: None immediate.    Anesthesia: local and general

## 2024-02-01 NOTE — PLAN OF CARE
Problem: Potential for Falls  Goal: Patient will remain free of falls  Description: INTERVENTIONS:  - Educate patient/family on patient safety including physical limitations  - Instruct patient to call for assistance with activity   - Consult OT/PT to assist with strengthening/mobility   - Keep Call bell within reach  - Keep bed low and locked with side rails adjusted as appropriate  - Keep care items and personal belongings within reach  - Initiate and maintain comfort rounds  - Make Fall Risk Sign visible to staff  - Offer Toileting every 2 Hours, in advance of need  - Initiate/Maintain alarm  - Obtain necessary fall risk management equipment:   - Apply yellow socks and bracelet for high fall risk patients  - Consider moving patient to room near nurses station  Outcome: Progressing     Problem: PAIN - ADULT  Goal: Verbalizes/displays adequate comfort level or baseline comfort level  Description: Interventions:  - Encourage patient to monitor pain and request assistance  - Assess pain using appropriate pain scale  - Administer analgesics based on type and severity of pain and evaluate response  - Implement non-pharmacological measures as appropriate and evaluate response  - Consider cultural and social influences on pain and pain management  - Notify physician/advanced practitioner if interventions unsuccessful or patient reports new pain  Outcome: Progressing     Problem: INFECTION - ADULT  Goal: Absence or prevention of progression during hospitalization  Description: INTERVENTIONS:  - Assess and monitor for signs and symptoms of infection  - Monitor lab/diagnostic results  - Monitor all insertion sites, i.e. indwelling lines, tubes, and drains  - Monitor endotracheal if appropriate and nasal secretions for changes in amount and color  - [place] appropriate cooling/warming therapies per order  - Administer medications as ordered  - Instruct and encourage patient and family to use good hand hygiene  technique  - Identify and instruct in appropriate isolation precautions for identified infection/condition  Outcome: Progressing  Goal: Absence of fever/infection during neutropenic period  Description: INTERVENTIONS:  - Monitor WBC    Outcome: Progressing     Problem: SAFETY ADULT  Goal: Patient will remain free of falls  Description: INTERVENTIONS:  - Educate patient/family on patient safety including physical limitations  - Instruct patient to call for assistance with activity   - Consult OT/PT to assist with strengthening/mobility   - Keep Call bell within reach  - Keep bed low and locked with side rails adjusted as appropriate  - Keep care items and personal belongings within reach  - Initiate and maintain comfort rounds  - Make Fall Risk Sign visible to staff  - Offer Toileting every 2 Hours, in advance of need  - Initiate/Maintain alarm  - Obtain necessary fall risk management equipment:   - Apply yellow socks and bracelet for high fall risk patients  - Consider moving patient to room near nurses station  Outcome: Progressing  Goal: Maintain or return to baseline ADL function  Description: INTERVENTIONS:  -  Assess patient's ability to carry out ADLs; assess patient's baseline for ADL function and identify physical deficits which impact ability to perform ADLs (bathing, care of mouth/teeth, toileting, grooming, dressing, etc.)  - Assess/evaluate cause of self-care deficits   - Assess range of motion  - Assess patient's mobility; develop plan if impaired  - Assess patient's need for assistive devices and provide as appropriate  - Encourage maximum independence but intervene and supervise when necessary  - Involve family in performance of ADLs  - Assess for home care needs following discharge   - Consider OT consult to assist with ADL evaluation and planning for discharge  - Provide patient education as appropriate  Outcome: Progressing  Goal: Maintains/Returns to pre admission functional level  Description:  INTERVENTIONS:  - Perform AM-PAC 6 Click Basic Mobility/ Daily Activity assessment daily.  - Set and communicate daily mobility goal to care team and patient/family/caregiver.   - Collaborate with rehabilitation services on mobility goals if consulted  - Perform Range of Motion 3 times a day.  - Reposition patient every 2 hours.  - Dangle patient 3 times a day  - Stand patient 3 times a day  - Ambulate patient 3 times a day  - Out of bed to chair 3 times a day   - Out of bed for meals 3 times a day  - Out of bed for toileting  - Record patient progress and toleration of activity level   Outcome: Progressing     Problem: DISCHARGE PLANNING  Goal: Discharge to home or other facility with appropriate resources  Description: INTERVENTIONS:  - Identify barriers to discharge w/patient and caregiver  - Arrange for needed discharge resources and transportation as appropriate  - Identify discharge learning needs (meds, wound care, etc.)  - Arrange for interpretive services to assist at discharge as needed  - Refer to Case Management Department for coordinating discharge planning if the patient needs post-hospital services based on physician/advanced practitioner order or complex needs related to functional status, cognitive ability, or social support system  Outcome: Progressing     Problem: Knowledge Deficit  Goal: Patient/family/caregiver demonstrates understanding of disease process, treatment plan, medications, and discharge instructions  Description: Complete learning assessment and assess knowledge base.  Interventions:  - Provide teaching at level of understanding  - Provide teaching via preferred learning methods  Outcome: Progressing     Problem: MUSCULOSKELETAL - ADULT  Goal: Maintain or return mobility to safest level of function  Description: INTERVENTIONS:  - Assess patient's ability to carry out ADLs; assess patient's baseline for ADL function and identify physical deficits which impact ability to perform ADLs  (bathing, care of mouth/teeth, toileting, grooming, dressing, etc.)  - Assess/evaluate cause of self-care deficits   - Assess range of motion  - Assess patient's mobility  - Assess patient's need for assistive devices and provide as appropriate  - Encourage maximum independence but intervene and supervise when necessary  - Involve family in performance of ADLs  - Assess for home care needs following discharge   - Consider OT consult to assist with ADL evaluation and planning for discharge  - Provide patient education as appropriate  Outcome: Progressing  Goal: Maintain proper alignment of affected body part  Description: INTERVENTIONS:  - Support, maintain and protect limb and body alignment  - Provide patient/ family with appropriate education  Outcome: Progressing     Problem: Prexisting or High Potential for Compromised Skin Integrity  Goal: Skin integrity is maintained or improved  Description: INTERVENTIONS:  - Identify patients at risk for skin breakdown  - Assess and monitor skin integrity  - Assess and monitor nutrition and hydration status  - Monitor labs   - Assess for incontinence   - Turn and reposition patient  - Assist with mobility/ambulation  - Relieve pressure over bony prominences  - Avoid friction and shearing  - Provide appropriate hygiene as needed including keeping skin clean and dry  - Evaluate need for skin moisturizer/barrier cream  - Collaborate with interdisciplinary team   - Patient/family teaching  - Consider wound care consult   Outcome: Progressing

## 2024-02-01 NOTE — ASSESSMENT & PLAN NOTE
MRI consistent with discitis/osteomyelitis of L1-2, L4-5, L5-S1 regions.  Neurosurgery consulted  Per ER discussion with neurosurgery, obtain blood cultures and hold off on antibiotics pending results unless patient clinically declines  WBC WNL, afebrile  Consider transfer if patient becomes unstable/develops red flag symptoms however exam remains stable  Prior provider discussed with ID - recommend IR consult for possible biopsy  Performed 1/30 for path  Performed again 2/1 with culture  Blood cultures negative x2 negative so far

## 2024-02-01 NOTE — SEDATION DOCUMENTATION
Lumbar spine bx completed. Band aid to site. Patient tolerated well with anesthesia support. Hemodynamically stable for transfer to PACU for recovery. Specimens hand delivered to lab for cultures.

## 2024-02-01 NOTE — DISCHARGE INSTRUCTIONS
Abscess/fluid collection Aspiration      WHAT YOU NEED TO KNOW:     Today you underwent a fluid collection/abscess aspiration. Usually the fluid is sent to the lab for culture.    You may have some pain associated with the puncture site.     The Procedure is performed with Local anesthesia (Lidocaine) and sometimes with local and IV Sedation.     Peripherally Inserted Central Catheter     WHAT YOU NEED TO KNOW:   A PICC is an IV placed into a large blood vessel near your heart. It is usually inserted through a blood vessel in your arm. Your PICC may have multiple ports. Ports are tubes where you can inject medicine. A PICC can stay in place for several weeks or months. You may need a PICC to get nutrition, medicine, or fluids. Blood samples can be removed from your PICC and sent to the lab for tests.   DISCHARGE INSTRUCTIONS:    [facility], [place] and [place] patients,    Contact Interventional Radiology at [phone]  if:  Blood soaks through your bandage.    Your arm or leg feels warm, tender, and painful. It may look swollen and red.   You have trouble moving your arm.    Your catheter falls out.  You have a fever or swelling, redness, pain, or pus where the catheter was inserted.  Persistent nausea or vomiting.    You cannot flush your catheter, or you feel pain when you flush your catheter.    You see a hole or crack in the tubing of your catheter.    You see fluid leaking from the insertion site.    You run out of supplies to care for your catheter.    You have questions or concerns about your condition or care.                                 After you go Home:    Home care  These tips can help your wound heal:  The wound may drain for the first 2 days. Cover the wound with a clean dry dressing. Change the dressing if it becomes soaked with blood or pus.  If a gauze packing was placed  inside the abscess pocket, you may be told to remove it yourself. You may do this in the shower. Once the packing is removed, you should wash the area in the shower, or clean the area as directed by your provider. Continue to do this until the skin opening has closed. Make sure you wash your hands after changing the packing or cleaning the wound.  If you were prescribed antibiotics, take them as directed until they are all gone.  You may use acetaminophen or ibuprofen to control pain, unless another pain medicine was prescribed. If you have liver disease or ever had a stomach ulcer, talk with your doctor before using these medicines.  Follow-up care  Follow up with your healthcare provider, or as advised. If a gauze packing was put in your wound, it should be removed in 1 to 2 days. Check your wound every day for any signs that the infection is getting worse. The signs are listed below.  When to seek medical advice  Call your healthcare provider right away if any of these occur:  Increasing redness or swelling  Red streaks in the skin leading away from the wound  Increasing local pain or swelling  Continued pus draining from the wound 2 days after treatment  Fever of 100.4ºF (38ºC) or higher, or as directed by your healthcare provider  Abscess  returns when you are at home                                                                                                           Abscess/fluid collection Aspiration      WHAT YOU NEED TO KNOW:     Today you underwent a fluid collection/abscess aspiration. Usually the fluid is sent to the lab for culture.    You may have some pain associated with the puncture site.     The Procedure is performed with Local anesthesia (Lidocaine) and sometimes with local and IV Sedation.                               After you go Home:    Home care  These tips can help your wound heal:  The wound may drain for the first 2 days. Cover the wound with a clean dry dressing. Change the  dressing if it becomes soaked with blood or pus.  If a gauze packing was placed inside the abscess pocket, you may be told to remove it yourself. You may do this in the shower. Once the packing is removed, you should wash the area in the shower, or clean the area as directed by your provider. Continue to do this until the skin opening has closed. Make sure you wash your hands after changing the packing or cleaning the wound.  If you were prescribed antibiotics, take them as directed until they are all gone.  You may use acetaminophen or ibuprofen to control pain, unless another pain medicine was prescribed. If you have liver disease or ever had a stomach ulcer, talk with your doctor before using these medicines.  Follow-up care  Follow up with your healthcare provider, or as advised. If a gauze packing was put in your wound, it should be removed in 1 to 2 days. Check your wound every day for any signs that the infection is getting worse. The signs are listed below.  When to seek medical advice  Call your healthcare provider right away if any of these occur:  Increasing redness or swelling  Red streaks in the skin leading away from the wound  Increasing local pain or swelling  Continued pus draining from the wound 2 days after treatment  Fever of 100.4ºF (38ºC) or higher, or as directed by your healthcare provider  Abscess  returns when you are at home

## 2024-02-01 NOTE — ASSESSMENT & PLAN NOTE
S/P partial thyroidectomy  Not maintained on levothyroxine - levels have been stable  Follows with [facility] - in remission

## 2024-02-01 NOTE — CONSULTS
e-Consult ([facility])  - Interventional Radiology  Juan San 68 y.o. male MRN: [id number]  Unit/Bed#: -01 Encounter: [id number]          Interventional Radiology has been consulted to evaluate Juan San    Inpatient Consult to IR  Consult performed by: Humble Smith MD  Consult ordered by: Sveta Smith PA-C      Inpatient Consult to IR  Consult performed by: Humble Smith MD  Consult ordered by: Sveta Smith PA-C        02/01/24    Assessment/Recommendation:   68 year old male with history of alcohol abuse, cirrhosis, thyroid cancer, DM, chronic back pain s/p lumbar spine fusion presented with back pain and was found to have discitis/osteomyelitis at L5-S1, L1-2 and L4-5 as well as discitis at L2-3 on MRI dated 1/24/2024.    - Plan for L5-S1 disc biopsy today for cultures.  - NPO  - Hold blood thinners      11-20 minutes, >50% of the total time devoted to medical consultative verbal/EMR discussion between providers. Written report will be generated in the EMR.     Thank you for allowing Interventional Radiology to participate in the care of Juan San. Please don't hesitate to call or TigerText us with any questions.     Humble Smith MD

## 2024-02-01 NOTE — ASSESSMENT & PLAN NOTE
Lab Results   Component Value Date    HGBA1C 8.2 (H) 01/24/2024       Recent Labs     01/31/24  1654 01/31/24  2111 02/01/24  0744 02/01/24  1050   POCGLU 114 140 125 123         Blood Sugar Average: Last 72 hrs:  (P) [id number]  Maintained on metformin and jardiance outpatient.   Hold while admitted, can resume on discharge  SSI with meals while admitted  Current Accu-Cheks and hypoglycemia protocol

## 2024-02-01 NOTE — PLAN OF CARE
Problem: Potential for Falls  Goal: Patient will remain free of falls  Description: INTERVENTIONS:  - Educate patient/family on patient safety including physical limitations  - Instruct patient to call for assistance with activity   - Consult OT/PT to assist with strengthening/mobility   - Keep Call bell within reach  - Keep bed low and locked with side rails adjusted as appropriate  - Keep care items and personal belongings within reach  - Initiate and maintain comfort rounds  - Make Fall Risk Sign visible to staff  - Offer Toileting every 2 Hours, in advance of need  - Initiate/Maintain call bell alarm  - Obtain necessary fall risk management equipment: call bell usage  - Apply yellow socks and bracelet for high fall risk patients  - Consider moving patient to room near nurses station  Outcome: Progressing     Problem: PAIN - ADULT  Goal: Verbalizes/displays adequate comfort level or baseline comfort level  Description: Interventions:  - Encourage patient to monitor pain and request assistance  - Assess pain using appropriate pain scale  - Administer analgesics based on type and severity of pain and evaluate response  - Implement non-pharmacological measures as appropriate and evaluate response  - Consider cultural and social influences on pain and pain management  - Notify physician/advanced practitioner if interventions unsuccessful or patient reports new pain  Outcome: Progressing     Problem: INFECTION - ADULT  Goal: Absence or prevention of progression during hospitalization  Description: INTERVENTIONS:  - Assess and monitor for signs and symptoms of infection  - Monitor lab/diagnostic results  - Monitor all insertion sites, i.e. indwelling lines, tubes, and drains  - Monitor endotracheal if appropriate and nasal secretions for changes in amount and color  - [place] appropriate cooling/warming therapies per order  - Administer medications as ordered  - Instruct and encourage patient and family to use good  hand hygiene technique  - Identify and instruct in appropriate isolation precautions for identified infection/condition  Outcome: Progressing  Goal: Absence of fever/infection during neutropenic period  Description: INTERVENTIONS:  - Monitor WBC    Outcome: Progressing

## 2024-02-01 NOTE — ANESTHESIA POSTPROCEDURE EVALUATION
Post-Op Assessment Note    CV Status:  Stable  Pain Score: 0    Pain management: adequate       Mental Status:  Alert and awake   Hydration Status:  Euvolemic   PONV Controlled:  Controlled   Airway Patency:  Patent     Post Op Vitals Reviewed: Yes    No anethesia notable event occurred.    Staff: CRNA               BP   167/72   Temp   98.3   Pulse  69   Resp   17   SpO2   100% 6Lo2 mask

## 2024-02-01 NOTE — ANESTHESIA PREPROCEDURE EVALUATION
Procedure:  IR BIOPSY SPINE    Relevant Problems   ANESTHESIA   (+) PONV (postoperative nausea and vomiting)      CARDIO   (+) Hypercholesterolemia   (+) Hypertension      GI/HEPATIC   (+) Cirrhosis, alcoholic (HCC)      /RENAL   (+) Benign prostatic hyperplasia with urinary retention      HEMATOLOGY   (+) Anemia      NEURO/PSYCH   (+) Anxiety and depression   (+) Chronic pain syndrome      Endocrine   (+) DM (diabetes mellitus) (HCC)   (+) Thyroid cancer (HCC)      Musculoskeletal and Integument   (+) Herniated nucleus pulposus of lumbosacral region      Other   (+) Benzodiazepine dependence (HCC)   (+) Medical marijuana use   (+) Status post lumbar spinal fusion      discitis/osteomyelitis at L5-S1, L1-2 and L4-5 as well as discitis at L2-3 on MRI dated 1/24/2024.          Anesthesia Plan  ASA Score- 3     Anesthesia Type- general with ASA Monitors.         Additional Monitors:     Airway Plan: ETT.           Plan Factors-    Chart reviewed. EKG reviewed.  Existing labs reviewed. Patient summary reviewed.    Patient is not a current smoker.              Induction- intravenous.    Postoperative Plan- . Planned trial extubation    Informed Consent-

## 2024-02-02 LAB
ANION GAP SERPL CALCULATED.3IONS-SCNC: 6 MMOL/L
BASOPHILS # BLD AUTO: 0.06 THOUSANDS/ÂΜL (ref 0–0.1)
BASOPHILS NFR BLD AUTO: 1 % (ref 0–1)
BUN SERPL-MCNC: 25 MG/DL (ref 5–25)
CALCIUM SERPL-MCNC: 9.6 MG/DL (ref 8.4–10.2)
CHLORIDE SERPL-SCNC: 97 MMOL/L (ref 96–108)
CO2 SERPL-SCNC: 31 MMOL/L (ref 21–32)
CREAT SERPL-MCNC: 0.82 MG/DL (ref 0.6–1.3)
EOSINOPHIL # BLD AUTO: 0.19 THOUSAND/ÂΜL (ref 0–0.61)
EOSINOPHIL NFR BLD AUTO: 2 % (ref 0–6)
ERYTHROCYTE [DISTWIDTH] IN BLOOD BY AUTOMATED COUNT: 16.6 % (ref 11.6–15.1)
GFR SERPL CREATININE-BSD FRML MDRD: 92 ML/MIN/1.73SQ M
GLUCOSE SERPL-MCNC: 122 MG/DL (ref 65–140)
GLUCOSE SERPL-MCNC: 151 MG/DL (ref 65–140)
GLUCOSE SERPL-MCNC: 169 MG/DL (ref 65–140)
GLUCOSE SERPL-MCNC: 174 MG/DL (ref 65–140)
GLUCOSE SERPL-MCNC: 175 MG/DL (ref 65–140)
HCT VFR BLD AUTO: 33.6 % (ref 36.5–49.3)
HGB BLD-MCNC: 10.3 G/DL (ref 12–17)
IMM GRANULOCYTES # BLD AUTO: 0.06 THOUSAND/UL (ref 0–0.2)
IMM GRANULOCYTES NFR BLD AUTO: 1 % (ref 0–2)
LYMPHOCYTES # BLD AUTO: 1.83 THOUSANDS/ÂΜL (ref 0.6–4.47)
LYMPHOCYTES NFR BLD AUTO: 23 % (ref 14–44)
MCH RBC QN AUTO: 25.5 PG (ref 26.8–34.3)
MCHC RBC AUTO-ENTMCNC: 30.7 G/DL (ref 31.4–37.4)
MCV RBC AUTO: 83 FL (ref 82–98)
MONOCYTES # BLD AUTO: 0.53 THOUSAND/ÂΜL (ref 0.17–1.22)
MONOCYTES NFR BLD AUTO: 7 % (ref 4–12)
NEUTROPHILS # BLD AUTO: 5.47 THOUSANDS/ÂΜL (ref 1.85–7.62)
NEUTS SEG NFR BLD AUTO: 66 % (ref 43–75)
NRBC BLD AUTO-RTO: 0 /100 WBCS
PLATELET # BLD AUTO: 335 THOUSANDS/UL (ref 149–390)
PMV BLD AUTO: 9.9 FL (ref 8.9–12.7)
POTASSIUM SERPL-SCNC: 4.1 MMOL/L (ref 3.5–5.3)
RBC # BLD AUTO: 4.04 MILLION/UL (ref 3.88–5.62)
SODIUM SERPL-SCNC: 134 MMOL/L (ref 135–147)
WBC # BLD AUTO: 8.14 THOUSAND/UL (ref 4.31–10.16)

## 2024-02-02 PROCEDURE — 88341 IMHCHEM/IMCYTCHM EA ADD ANTB: CPT | Performed by: PATHOLOGY

## 2024-02-02 PROCEDURE — 82948 REAGENT STRIP/BLOOD GLUCOSE: CPT

## 2024-02-02 PROCEDURE — 88312 SPECIAL STAINS GROUP 1: CPT | Performed by: PATHOLOGY

## 2024-02-02 PROCEDURE — 99232 SBSQ HOSP IP/OBS MODERATE 35: CPT | Performed by: PHYSICIAN ASSISTANT

## 2024-02-02 PROCEDURE — 85025 COMPLETE CBC W/AUTO DIFF WBC: CPT | Performed by: PHYSICIAN ASSISTANT

## 2024-02-02 PROCEDURE — 88342 IMHCHEM/IMCYTCHM 1ST ANTB: CPT | Performed by: PATHOLOGY

## 2024-02-02 PROCEDURE — 80048 BASIC METABOLIC PNL TOTAL CA: CPT | Performed by: PHYSICIAN ASSISTANT

## 2024-02-02 PROCEDURE — 88305 TISSUE EXAM BY PATHOLOGIST: CPT | Performed by: PATHOLOGY

## 2024-02-02 RX ADMIN — CLONAZEPAM 1 MG: 1 TABLET ORAL at 17:37

## 2024-02-02 RX ADMIN — HEPARIN SODIUM 5000 UNITS: 5000 INJECTION INTRAVENOUS; SUBCUTANEOUS at 21:40

## 2024-02-02 RX ADMIN — MORPHINE SULFATE 4 MG: 4 INJECTION, SOLUTION INTRAMUSCULAR; INTRAVENOUS at 19:15

## 2024-02-02 RX ADMIN — CLONAZEPAM 1 MG: 1 TABLET ORAL at 08:20

## 2024-02-02 RX ADMIN — DOCUSATE SODIUM 100 MG: 100 CAPSULE, LIQUID FILLED ORAL at 08:21

## 2024-02-02 RX ADMIN — GABAPENTIN 400 MG: 400 CAPSULE ORAL at 17:37

## 2024-02-02 RX ADMIN — Medication 3 MG: at 21:40

## 2024-02-02 RX ADMIN — HEPARIN SODIUM 5000 UNITS: 5000 INJECTION INTRAVENOUS; SUBCUTANEOUS at 04:50

## 2024-02-02 RX ADMIN — POLYETHYLENE GLYCOL 3350 17 G: 17 POWDER, FOR SOLUTION ORAL at 08:21

## 2024-02-02 RX ADMIN — METHOCARBAMOL TABLETS 750 MG: 500 TABLET, COATED ORAL at 17:37

## 2024-02-02 RX ADMIN — INSULIN LISPRO 1 UNITS: 100 INJECTION, SOLUTION INTRAVENOUS; SUBCUTANEOUS at 08:21

## 2024-02-02 RX ADMIN — SERTRALINE HYDROCHLORIDE 100 MG: 100 TABLET ORAL at 08:20

## 2024-02-02 RX ADMIN — SENNOSIDES 17.2 MG: 8.6 TABLET, FILM COATED ORAL at 21:40

## 2024-02-02 RX ADMIN — TAMSULOSIN HYDROCHLORIDE 0.4 MG: 0.4 CAPSULE ORAL at 17:37

## 2024-02-02 RX ADMIN — ACETAMINOPHEN 325MG 650 MG: 325 TABLET ORAL at 10:59

## 2024-02-02 RX ADMIN — MORPHINE SULFATE 4 MG: 4 INJECTION, SOLUTION INTRAMUSCULAR; INTRAVENOUS at 02:09

## 2024-02-02 RX ADMIN — OXYCODONE HYDROCHLORIDE 10 MG: 10 TABLET ORAL at 17:37

## 2024-02-02 RX ADMIN — NIFEDIPINE 120 MG: 30 TABLET, EXTENDED RELEASE ORAL at 08:31

## 2024-02-02 RX ADMIN — FOLIC ACID 2000 MCG: 1 TABLET ORAL at 08:20

## 2024-02-02 RX ADMIN — INSULIN LISPRO 1 UNITS: 100 INJECTION, SOLUTION INTRAVENOUS; SUBCUTANEOUS at 21:41

## 2024-02-02 RX ADMIN — HEPARIN SODIUM 5000 UNITS: 5000 INJECTION INTRAVENOUS; SUBCUTANEOUS at 14:50

## 2024-02-02 RX ADMIN — OXYCODONE HYDROCHLORIDE 10 MG: 10 TABLET ORAL at 10:59

## 2024-02-02 RX ADMIN — FINASTERIDE 5 MG: 5 TABLET, FILM COATED ORAL at 08:20

## 2024-02-02 RX ADMIN — GABAPENTIN 400 MG: 400 CAPSULE ORAL at 08:21

## 2024-02-02 RX ADMIN — OXYCODONE HYDROCHLORIDE 10 MG: 10 TABLET ORAL at 21:40

## 2024-02-02 RX ADMIN — LOSARTAN POTASSIUM 100 MG: 50 TABLET, FILM COATED ORAL at 08:20

## 2024-02-02 RX ADMIN — HYDROCHLOROTHIAZIDE 25 MG: 25 TABLET ORAL at 08:20

## 2024-02-02 RX ADMIN — LIDOCAINE 5% 1 PATCH: 700 PATCH TOPICAL at 08:21

## 2024-02-02 RX ADMIN — ACETAMINOPHEN 325MG 650 MG: 325 TABLET ORAL at 21:40

## 2024-02-02 RX ADMIN — METOPROLOL SUCCINATE 75 MG: 50 TABLET, EXTENDED RELEASE ORAL at 08:20

## 2024-02-02 RX ADMIN — PRAVASTATIN SODIUM 20 MG: 20 TABLET ORAL at 17:37

## 2024-02-02 RX ADMIN — CLONAZEPAM 0.5 MG: 0.5 TABLET ORAL at 21:40

## 2024-02-02 RX ADMIN — MORPHINE SULFATE 4 MG: 4 INJECTION, SOLUTION INTRAMUSCULAR; INTRAVENOUS at 08:32

## 2024-02-02 RX ADMIN — INSULIN LISPRO 1 UNITS: 100 INJECTION, SOLUTION INTRAVENOUS; SUBCUTANEOUS at 12:05

## 2024-02-02 RX ADMIN — DOCUSATE SODIUM 100 MG: 100 CAPSULE, LIQUID FILLED ORAL at 17:37

## 2024-02-02 RX ADMIN — CLONAZEPAM 0.5 MG: 0.5 TABLET ORAL at 02:09

## 2024-02-02 RX ADMIN — MIRTAZAPINE 45 MG: 15 TABLET, FILM COATED ORAL at 21:40

## 2024-02-02 NOTE — PROGRESS NOTES
[facility]  Progress Note  Name: Juan San I  MRN: [id number]  Unit/Bed#: -01 I Date of Admission: 1/24/2024   Date of Service: 2/2/2024 I Hospital Day: 9    Assessment/Plan   * Discitis of lumbosacral region  Assessment & Plan  MRI consistent with discitis/osteomyelitis of L1-2, L4-5, L5-S1 regions.  Neurosurgery consulted  Per ER discussion with neurosurgery, obtain blood cultures and hold off on antibiotics pending results unless patient clinically declines  WBC WNL, afebrile  Consider transfer if patient becomes unstable/develops red flag symptoms however exam remains stable  Prior provider discussed with ID - recommend IR consult for possible biopsy  Performed 1/30 for path  Performed again 2/1 with culture  Blood cultures negative x2 negative so far    Hypertension  Assessment & Plan  Home regimen includes losartan/HCTZ, metoprolol, nifedipine  Blood pressure stable    Benign prostatic hyperplasia with urinary retention  Assessment & Plan  Continue finasteride and tamsulosin  Urinary retention protocol    Thyroid cancer (HCC)  Assessment & Plan  S/P partial thyroidectomy  Not maintained on levothyroxine - levels have been stable  Follows with Horacio [facility] - in remission    Herniated nucleus pulposus of lumbosacral region  Assessment & Plan  S/P surgical intervention April 2023    DM (diabetes mellitus) (HCC)  Assessment & Plan  Lab Results   Component Value Date    HGBA1C 8.2 (H) 01/24/2024       Recent Labs     02/01/24  1632 02/01/24  2046 02/02/24  0749 02/02/24  1045   POCGLU 165* 243* 174* 169*         Blood Sugar Average: Last 72 hrs:  (P) [id number]  Maintained on metformin and jardiance outpatient.   Hold while admitted, can resume on discharge  SSI with meals while admitted  Current Accu-Cheks and hypoglycemia protocol    Cirrhosis, alcoholic (HCC)  Assessment & Plan  Continue outpatient follow-up  Follows with PCP         VTE Pharmacologic Prophylaxis:  VTE Score: 3 Moderate Risk (Score 3-4) - Pharmacological DVT Prophylaxis Ordered: heparin.    Mobility:   Basic Mobility Inpatient Raw Score: 24  JH-HLM Goal: 8: Walk 250 feet or more  JH-HLM Achieved: 8: Walk 250 feet ot more (pt is indep)  HLM Goal achieved. Continue to encourage appropriate mobility.    Patient Centered Rounds: I performed bedside rounds with nursing staff today.   Discussions with Specialists or Other Care Team Provider: YANN    Education and Discussions with Family / Patient: Patient declined call to .     Total Time Spent on Date of Encounter in care of patient: 35 mins. This time was spent on one or more of the following: performing physical exam; counseling and coordination of care; obtaining or reviewing history; documenting in the medical record; reviewing/ordering tests, medications or procedures; communicating with other healthcare professionals and discussing with patient's family/caregivers.    Current Length of Stay: 9 day(s)  Current Patient Status: Inpatient   Certification Statement: The patient will continue to require additional inpatient hospital stay due to awaiting culture results  Discharge Plan: Anticipate discharge in 24-48 hrs to home with home services.    Code Status: Level 1 - Full Code    Subjective:   Patient feels well this morning, no events overnight.  Back pain well-controlled.  Denies chest pain/palpitations, shortness of breath, nausea/vomiting or abdominal pain.  No new neurologic symptoms.    Objective:     Vitals:   Temp (24hrs), Av.2 °F (36.8 °C), Min:97.2 °F (36.2 °C), Max:98.8 °F (37.1 °C)    Temp:  [97.2 °F (36.2 °C)-98.8 °F (37.1 °C)] 97.9 °F (36.6 °C)  HR:  [65-94] 65  Resp:  [14-22] 17  BP: ()/(66-84) 167/78  SpO2:  [96 %-100 %] 100 %  Body mass index is 26.58 kg/m².     Input and Output Summary (last 24 hours):     Intake/Output Summary (Last 24 hours) at 2024 1147  Last data filed at 2024 1013  Gross per 24 hour   Intake  1060 ml   Output --   Net 1060 ml       Physical Exam:   Physical Exam  Vitals and nursing note reviewed.   Constitutional:       General: He is not in acute distress.     Appearance: He is well-developed.      Comments: No acute distress   HENT:      Head: Normocephalic and atraumatic.   Eyes:      General: No scleral icterus.     Extraocular Movements: Extraocular movements intact.      Conjunctiva/sclera: Conjunctivae normal.   Cardiovascular:      Rate and Rhythm: Normal rate and regular rhythm.      Heart sounds: No murmur heard.  Pulmonary:      Effort: Pulmonary effort is normal. No respiratory distress.      Breath sounds: Normal breath sounds. No wheezing, rhonchi or rales.   Abdominal:      General: Bowel sounds are normal.      Palpations: Abdomen is soft.      Tenderness: There is no abdominal tenderness. There is no guarding or rebound.   Musculoskeletal:         General: No swelling.      Cervical back: Normal range of motion.      Comments: Strength 5/5 throughout, no edema   Skin:     General: Skin is warm and dry.      Capillary Refill: Capillary refill takes less than 2 seconds.   Neurological:      General: No focal deficit present.      Mental Status: He is alert and oriented to person, place, and time.   Psychiatric:         Mood and Affect: Mood normal.         Speech: Speech normal.         Behavior: Behavior normal.          Additional Data:     Labs:  Results from last 7 days   Lab Units 02/02/24  0449   WBC Thousand/uL 8.14   HEMOGLOBIN g/dL 10.3*   HEMATOCRIT % 33.6*   PLATELETS Thousands/uL 335   NEUTROS PCT % 66   LYMPHS PCT % 23   MONOS PCT % 7   EOS PCT % 2     Results from last 7 days   Lab Units 02/02/24  0449   SODIUM mmol/L 134*   POTASSIUM mmol/L 4.1   CHLORIDE mmol/L 97   CO2 mmol/L 31   BUN mg/dL 25   CREATININE mg/dL 0.82   ANION GAP mmol/L 6   CALCIUM mg/dL 9.6   GLUCOSE RANDOM mg/dL 175*     Results from last 7 days   Lab Units 01/27/24  0031   INR  0.94     Results from last  7 days   Lab Units 02/02/24  1045 02/02/24  0749 02/01/24  2046 02/01/24  1632 02/01/24  1050 02/01/24  0744 01/31/24  2111 01/31/24  1654 01/31/24  1120 01/31/24  0753 01/30/24  2104 01/30/24  1605   POC GLUCOSE mg/dl 169* 174* 243* 165* 123 125 140 114 279* 114 194* 113               Lines/Drains:  Invasive Devices       Peripheral Intravenous Line  Duration             Peripheral IV 02/01/24 Left Wrist <1 day                          Imaging: No pertinent imaging reviewed.    Recent Cultures (last 7 days):   Results from last 7 days   Lab Units 02/01/24  1455   GRAM STAIN RESULT  No Polys  No organisms seen       Last 24 Hours Medication List:   Current Facility-Administered Medications   Medication Dose Route Frequency Provider Last Rate    acetaminophen  650 mg Oral Q6H PRN Sveta Smith PA-C      aluminum-magnesium hydroxide-simethicone  30 mL Oral Q6H PRN Sveta Smith PA-C      clonazePAM  0.5 mg Oral Daily PRN Tiffany Cobb MD      clonazePAM  1 mg Oral BID Tiffany Cobb MD      docusate sodium  100 mg Oral BID Sailaja Salcedo PA-C      fentaNYL  25 mcg Intravenous Q5 Min PRN Portia Henderson MD      finasteride  5 mg Oral Daily Sveta Smith PA-C      fluticasone  1 spray Nasal Daily PRN Sveta Smith PA-C      folic acid  2,000 mcg Oral Daily Sveta Smith PA-C      gabapentin  400 mg Oral BID Sveta Smith PA-C      heparin (porcine)  5,000 Units Subcutaneous Q8H [facility] Sveta Smith PA-C      losartan  100 mg Oral Daily Adelita Marina, DO      And    hydroCHLOROthiazide  25 mg Oral Daily Adelita Marina, DO      insulin lispro  1-6 Units Subcutaneous TID AC Sveta Smith PA-C      insulin lispro  1-6 Units Subcutaneous HS Sveta Smith PA-C      lactated ringers  10 mL/hr Intravenous Continuous Randee Troisi, CRNA 10 mL/hr (02/01/24 1633)    lidocaine  1 patch Topical Daily Sveta Smith PA-C      melatonin  3 mg Oral HS Sveta Smith PA-C      methocarbamol  750 mg Oral Q8H PRN Sveta Smith PA-C      metoprolol succinate  75 mg Oral QAM  Sveta Smith PA-C      mirtazapine  45 mg Oral HS Sveta Smith PA-C      morphine injection  4 mg Intravenous Q4H PRN Sveta Smith PA-C      NIFEdipine  120 mg Oral Daily Adelita Marina DO      ondansetron  4 mg Intravenous Q6H PRN Sveta Smith, DANIEL      oxyCODONE  10 mg Oral Q4H PRN Sveta Smith, DANIEL      oxyCODONE  5 mg Oral Q4H PRN Sveta Smith PA-C      polyethylene glycol  17 g Oral Daily Sailaja Salcedo PA-C      pravastatin  20 mg Oral Daily With Dinner Sveta Smith PA-C      senna  2 tablet Oral HS Sailaja Salcedo PA-C      sertraline  100 mg Oral Daily Adelita Marina DO      tamsulosin  0.4 mg Oral Daily With Dinner Sveta Smith PA-C          Today, Patient Was Seen By: Sintia Soto PA-C    **Please Note: This note may have been constructed using a voice recognition system.**

## 2024-02-02 NOTE — ASSESSMENT & PLAN NOTE
Lab Results   Component Value Date    HGBA1C 8.2 (H) 01/24/2024       Recent Labs     02/01/24  1632 02/01/24  2046 02/02/24  0749 02/02/24  1045   POCGLU 165* 243* 174* 169*         Blood Sugar Average: Last 72 hrs:  (P) [id number]  Maintained on metformin and jardiance outpatient.   Hold while admitted, can resume on discharge  SSI with meals while admitted  Current Accu-Cheks and hypoglycemia protocol

## 2024-02-02 NOTE — PLAN OF CARE
Problem: Potential for Falls  Goal: Patient will remain free of falls  Description: INTERVENTIONS:  - Educate patient/family on patient safety including physical limitations  - Instruct patient to call for assistance with activity   - Consult OT/PT to assist with strengthening/mobility   - Keep Call bell within reach  - Keep bed low and locked with side rails adjusted as appropriate  - Keep care items and personal belongings within reach  - Initiate and maintain comfort rounds  - Make Fall Risk Sign visible to staff  - Offer Toileting every  Hours, in advance of need  - Initiate/Maintain alarm  - Obtain necessary fall risk management equipment:   - Apply yellow socks and bracelet for high fall risk patients  - Consider moving patient to room near nurses station  Outcome: Progressing     Problem: PAIN - ADULT  Goal: Verbalizes/displays adequate comfort level or baseline comfort level  Description: Interventions:  - Encourage patient to monitor pain and request assistance  - Assess pain using appropriate pain scale  - Administer analgesics based on type and severity of pain and evaluate response  - Implement non-pharmacological measures as appropriate and evaluate response  - Consider cultural and social influences on pain and pain management  - Notify physician/advanced practitioner if interventions unsuccessful or patient reports new pain  Outcome: Progressing     Problem: INFECTION - ADULT  Goal: Absence or prevention of progression during hospitalization  Description: INTERVENTIONS:  - Assess and monitor for signs and symptoms of infection  - Monitor lab/diagnostic results  - Monitor all insertion sites, i.e. indwelling lines, tubes, and drains  - Monitor endotracheal if appropriate and nasal secretions for changes in amount and color  - [place] appropriate cooling/warming therapies per order  - Administer medications as ordered  - Instruct and encourage patient and family to use good hand hygiene technique  -  Identify and instruct in appropriate isolation precautions for identified infection/condition  Outcome: Progressing  Goal: Absence of fever/infection during neutropenic period  Description: INTERVENTIONS:  - Monitor WBC    Outcome: Progressing     Problem: SAFETY ADULT  Goal: Patient will remain free of falls  Description: INTERVENTIONS:  - Educate patient/family on patient safety including physical limitations  - Instruct patient to call for assistance with activity   - Consult OT/PT to assist with strengthening/mobility   - Keep Call bell within reach  - Keep bed low and locked with side rails adjusted as appropriate  - Keep care items and personal belongings within reach  - Initiate and maintain comfort rounds  - Make Fall Risk Sign visible to staff  - Offer Toileting every 2 Hours, in advance of need  - Initiate/Maintain alarm  - Obtain necessary fall risk management equipment:   - Apply yellow socks and bracelet for high fall risk patients  - Consider moving patient to room near nurses station  Outcome: Progressing  Goal: Maintain or return to baseline ADL function  Description: INTERVENTIONS:  - Educate patient/family on patient safety including physical limitations  - Instruct patient to call for assistance with activity   - Consult OT/PT to assist with strengthening/mobility   - Keep Call bell within reach  - Keep bed low and locked with side rails adjusted as appropriate  - Keep care items and personal belongings within reach  - Initiate and maintain comfort rounds  - Make Fall Risk Sign visible to staff  - Offer Toileting every 2 Hours, in advance of need  - Initiate/Maintain alarm  - Obtain necessary fall risk management equipment:  - Apply yellow socks and bracelet for high fall risk patients  - Consider moving patient to room near nurses station  Outcome: Progressing  Goal: Maintains/Returns to pre admission functional level  Description: INTERVENTIONS:  -  Assess patient's ability to carry out ADLs;  assess patient's baseline for ADL function and identify physical deficits which impact ability to perform ADLs (bathing, care of mouth/teeth, toileting, grooming, dressing, etc.)  - Assess/evaluate cause of self-care deficits   - Assess range of motion  - Assess patient's mobility; develop plan if impaired  - Assess patient's need for assistive devices and provide as appropriate  - Encourage maximum independence but intervene and supervise when necessary  - Involve family in performance of ADLs  - Assess for home care needs following discharge   - Consider OT consult to assist with ADL evaluation and planning for discharge  - Provide patient education as appropriate  Outcome: Progressing     Problem: DISCHARGE PLANNING  Goal: Discharge to home or other facility with appropriate resources  Description: INTERVENTIONS:  - Identify barriers to discharge w/patient and caregiver  - Arrange for needed discharge resources and transportation as appropriate  - Identify discharge learning needs (meds, wound care, etc.)  - Arrange for interpretive services to assist at discharge as needed  - Refer to Case Management Department for coordinating discharge planning if the patient needs post-hospital services based on physician/advanced practitioner order or complex needs related to functional status, cognitive ability, or social support system  Outcome: Progressing     Problem: Knowledge Deficit  Goal: Patient/family/caregiver demonstrates understanding of disease process, treatment plan, medications, and discharge instructions  Description: Complete learning assessment and assess knowledge base.  Interventions:  - Provide teaching at level of understanding  - Provide teaching via preferred learning methods  Outcome: Progressing     Problem: MUSCULOSKELETAL - ADULT  Goal: Maintain or return mobility to safest level of function  Description: INTERVENTIONS:  - Assess patient's ability to carry out ADLs; assess patient's baseline for  ADL function and identify physical deficits which impact ability to perform ADLs (bathing, care of mouth/teeth, toileting, grooming, dressing, etc.)  - Assess/evaluate cause of self-care deficits   - Assess range of motion  - Assess patient's mobility  - Assess patient's need for assistive devices and provide as appropriate  - Encourage maximum independence but intervene and supervise when necessary  - Involve family in performance of ADLs  - Assess for home care needs following discharge   - Consider OT consult to assist with ADL evaluation and planning for discharge  - Provide patient education as appropriate  Outcome: Progressing  Goal: Maintain proper alignment of affected body part  Description: INTERVENTIONS:  - Support, maintain and protect limb and body alignment  - Provide patient/ family with appropriate education  Outcome: Progressing     Problem: Prexisting or High Potential for Compromised Skin Integrity  Goal: Skin integrity is maintained or improved  Description: INTERVENTIONS:  - Identify patients at risk for skin breakdown  - Assess and monitor skin integrity  - Assess and monitor nutrition and hydration status  - Monitor labs   - Assess for incontinence   - Turn and reposition patient  - Assist with mobility/ambulation  - Relieve pressure over bony prominences  - Avoid friction and shearing  - Provide appropriate hygiene as needed including keeping skin clean and dry  - Evaluate need for skin moisturizer/barrier cream  - Collaborate with interdisciplinary team   - Patient/family teaching  - Consider wound care consult   Outcome: Progressing

## 2024-02-02 NOTE — ASSESSMENT & PLAN NOTE
S/P partial thyroidectomy  Not maintained on levothyroxine - levels have been stable  Follows with [facility] - in remission

## 2024-02-02 NOTE — PLAN OF CARE
Problem: PAIN - ADULT  Goal: Verbalizes/displays adequate comfort level or baseline comfort level  Description: Interventions:  - Encourage patient to monitor pain and request assistance  - Assess pain using appropriate pain scale  - Administer analgesics based on type and severity of pain and evaluate response  - Implement non-pharmacological measures as appropriate and evaluate response  - Consider cultural and social influences on pain and pain management  - Notify physician/advanced practitioner if interventions unsuccessful or patient reports new pain  Outcome: Progressing     Problem: INFECTION - ADULT  Goal: Absence or prevention of progression during hospitalization  Description: INTERVENTIONS:  - Assess and monitor for signs and symptoms of infection  - Monitor lab/diagnostic results  - Monitor all insertion sites, i.e. indwelling lines, tubes, and drains  - Monitor endotracheal if appropriate and nasal secretions for changes in amount and color  - [place] appropriate cooling/warming therapies per order  - Administer medications as ordered  - Instruct and encourage patient and family to use good hand hygiene technique  - Identify and instruct in appropriate isolation precautions for identified infection/condition  Outcome: Progressing  Goal: Absence of fever/infection during neutropenic period  Description: INTERVENTIONS:  - Monitor WBC    Outcome: Progressing     Problem: SAFETY ADULT  Goal: Maintain or return to baseline ADL function  Description: INTERVENTIONS:  -  Assess patient's ability to carry out ADLs; assess patient's baseline for ADL function and identify physical deficits which impact ability to perform ADLs (bathing, care of mouth/teeth, toileting, grooming, dressing, etc.)  - Assess/evaluate cause of self-care deficits   - Assess range of motion  - Assess patient's mobility; develop plan if impaired  - Assess patient's need for assistive devices and provide as appropriate  - Encourage  53-year-old male with past medical history of childhood asthma presents the ER today for evaluation of headache, cough, congestion, sinus pressure, fatigue, myalgias, wheezing, and shortness of breath that started 2 to 3 days ago. Denies exposure to sick contacts. Past Medical History:   Diagnosis Date    Asthma        Past Surgical History:   Procedure Laterality Date    HX HERNIA REPAIR           No family history on file. Social History     Socioeconomic History    Marital status: SINGLE     Spouse name: Not on file    Number of children: Not on file    Years of education: Not on file    Highest education level: Not on file   Occupational History    Not on file   Tobacco Use    Smoking status: Every Day     Packs/day: 0.50     Types: Cigarettes    Smokeless tobacco: Never   Substance and Sexual Activity    Alcohol use: Yes     Comment: Social    Drug use: No    Sexual activity: Not on file   Other Topics Concern    Not on file   Social History Narrative    Not on file     Social Determinants of Health     Financial Resource Strain: Not on file   Food Insecurity: Not on file   Transportation Needs: Not on file   Physical Activity: Not on file   Stress: Not on file   Social Connections: Not on file   Intimate Partner Violence: Not on file   Housing Stability: Not on file         ALLERGIES: Patient has no known allergies. Review of Systems   Constitutional:  Positive for chills and fatigue. HENT:  Positive for congestion and sinus pressure. Respiratory:  Positive for cough, shortness of breath and wheezing. Cardiovascular:  Negative for chest pain and palpitations. Gastrointestinal:  Negative for abdominal pain and vomiting. Genitourinary:  Negative for dysuria and flank pain. Musculoskeletal:  Positive for myalgias. Neurological:  Positive for headaches. All other systems reviewed and are negative.     Vitals:    08/29/22 1148   BP: 119/77   Pulse: 93   Resp: 16   Temp: 98.4 °F (36.9 maximum independence but intervene and supervise when necessary  - Involve family in performance of ADLs  - Assess for home care needs following discharge   - Consider OT consult to assist with ADL evaluation and planning for discharge  - Provide patient education as appropriate  Outcome: Progressing     Problem: DISCHARGE PLANNING  Goal: Discharge to home or other facility with appropriate resources  Description: INTERVENTIONS:  - Identify barriers to discharge w/patient and caregiver  - Arrange for needed discharge resources and transportation as appropriate  - Identify discharge learning needs (meds, wound care, etc.)  - Arrange for interpretive services to assist at discharge as needed  - Refer to Case Management Department for coordinating discharge planning if the patient needs post-hospital services based on physician/advanced practitioner order or complex needs related to functional status, cognitive ability, or social support system  Outcome: Progressing     Problem: Knowledge Deficit  Goal: Patient/family/caregiver demonstrates understanding of disease process, treatment plan, medications, and discharge instructions  Description: Complete learning assessment and assess knowledge base.  Interventions:  - Provide teaching at level of understanding  - Provide teaching via preferred learning methods  Outcome: Progressing     Problem: MUSCULOSKELETAL - ADULT  Goal: Maintain or return mobility to safest level of function  Description: INTERVENTIONS:  - Assess patient's ability to carry out ADLs; assess patient's baseline for ADL function and identify physical deficits which impact ability to perform ADLs (bathing, care of mouth/teeth, toileting, grooming, dressing, etc.)  - Assess/evaluate cause of self-care deficits   - Assess range of motion  - Assess patient's mobility  - Assess patient's need for assistive devices and provide as appropriate  - Encourage maximum independence but intervene and supervise when  °C)   SpO2: 99%   Weight: 117.9 kg (260 lb)   Height: 5' 11\" (1.803 m)            Physical Exam  Vitals and nursing note reviewed. Constitutional:       General: He is not in acute distress. Appearance: Normal appearance. He is not ill-appearing. HENT:      Head: Normocephalic and atraumatic. Nose: Nose normal.      Mouth/Throat:      Mouth: Mucous membranes are moist.      Pharynx: Oropharynx is clear. Eyes:      Extraocular Movements: Extraocular movements intact. Cardiovascular:      Rate and Rhythm: Normal rate and regular rhythm. Pulses: Normal pulses. Radial pulses are 2+ on the right side and 2+ on the left side. Heart sounds: Normal heart sounds. Pulmonary:      Effort: Tachypnea present. No accessory muscle usage or respiratory distress. Breath sounds: Wheezing present. No rhonchi or rales. Abdominal:      General: Bowel sounds are normal.      Palpations: Abdomen is soft. Musculoskeletal:         General: Normal range of motion. Cervical back: Normal range of motion and neck supple. Skin:     General: Skin is warm and dry. Neurological:      Mental Status: He is alert and oriented to person, place, and time. Mental status is at baseline. Psychiatric:         Mood and Affect: Mood normal.         Behavior: Behavior normal.        [facility]    ED Course as of 08/29/22 1457   Mon Aug 29, 2022   1202 EKG shows sinus rhythm at a rate of 87, normal intervals, normal axis, no ischemic changes.  [RD]      ED Course User Index  [RD] Rahul Cobb MD     VITAL SIGNS:  Patient Vitals for the past 4 hrs:   Temp Pulse Resp BP SpO2   08/29/22 1354 -- 97 -- (!) 141/79 --   08/29/22 1148 98.4 °F (36.9 °C) 93 16 119/77 99 %         LABS:  Recent Results (from the past 6 hour(s))   COVID-19 RAPID TEST    Collection Time: 08/29/22  1:55 PM   Result Value Ref Range    Specimen source Nasopharyngeal      COVID-19 rapid test Not detected NOTD          IMAGING:  XR CHEST PORT Final Result   Normal chest.            Medications During Visit:  Medications   predniSONE (DELTASONE) tablet 60 mg (60 mg Oral Given 8/29/22 1354)   albuterol (PROVENTIL HFA, VENTOLIN HFA, PROAIR HFA) inhaler 4 Puff (4 Puffs Inhalation Given 8/29/22 1354)         DECISION MAKING:  Margaux Betancur is a 31 y.o. male who comes in as above. X-ray clear; COVID-19 negative. Symptoms consistent with reactive airway disease to viral illness. Feeling better and wheezing has improved and only after prednisone and MDI. Discussed symptom control at home as well as isolation return precautions. The clinical decision making for this encounter included ordering and interpreting the above diagnostic tests with comparison to prior studies that are within our EMR. Past medical and surgical histories were reviewed, as were records from recent outpatient and emergency department visits. The above results discussed and reviewed with the patient. Patient verbalized understanding of the care plan, including any changes to current outpatient medication regimen, discussed disease process, symptom control, and follow-up care. Return precautions reviewed. IMPRESSION:  1. Mild reactive airways disease, unspecified whether persistent    2. Viral syndrome        DISPOSITION:  Discharged      Current Discharge Medication List        START taking these medications    Details   guaiFENesin (Mucinex) 1,200 mg Ta12 ER tablet Take 1 Tablet by mouth two (2) times a day. Qty: 20 Tablet, Refills: 0  Start date: 8/29/2022      predniSONE (DELTASONE) 50 mg tablet Take 1 Tablet by mouth daily for 5 days. Qty: 5 Tablet, Refills: 0  Start date: 8/30/2022, End date: 9/4/2022      albuterol (PROVENTIL HFA, VENTOLIN HFA, PROAIR HFA) 90 mcg/actuation inhaler Take 2 Puffs by inhalation every four (4) hours as needed for Wheezing or Shortness of Breath.   Qty: 18 g, Refills: 0  Start date: 8/29/2022              Follow-up Information necessary  - Involve family in performance of ADLs  - Assess for home care needs following discharge   - Consider OT consult to assist with ADL evaluation and planning for discharge  - Provide patient education as appropriate  Outcome: Progressing  Goal: Maintain proper alignment of affected body part  Description: INTERVENTIONS:  - Support, maintain and protect limb and body alignment  - Provide patient/ family with appropriate education  Outcome: Progressing     Problem: Prexisting or High Potential for Compromised Skin Integrity  Goal: Skin integrity is maintained or improved  Description: INTERVENTIONS:  - Identify patients at risk for skin breakdown  - Assess and monitor skin integrity  - Assess and monitor nutrition and hydration status  - Monitor labs   - Assess for incontinence   - Turn and reposition patient  - Assist with mobility/ambulation  - Relieve pressure over bony prominences  - Avoid friction and shearing  - Provide appropriate hygiene as needed including keeping skin clean and dry  - Evaluate need for skin moisturizer/barrier cream  - Collaborate with interdisciplinary team   - Patient/family teaching  - Consider wound care consult   Outcome: Progressing      Follow up With Specialties Details Why Contact Info    Genevieve Rosa MD Internal Medicine Physician Schedule an appointment as soon as possible for a visit  As needed [address]  [phone]                The patient is asked to follow-up with their primary care provider in the next several days. They are to call tomorrow for an appointment. The patient is asked to return promptly for any increased concerns or worsening of symptoms. They can return to this emergency department or any other emergency department.       Procedures

## 2024-02-02 NOTE — QUICK NOTE
Received sepsis time zero for pt - SIRS met with RR 22 and HR 91. SBP noted to be 88mmHg. On review of chart, SBP inaccurate reading as BP immediately after was 133/73 without intervention. No signs of new or worsening infection.

## 2024-02-03 LAB
ANION GAP SERPL CALCULATED.3IONS-SCNC: 6 MMOL/L
BASOPHILS # BLD AUTO: 0.09 THOUSANDS/ÂΜL (ref 0–0.1)
BASOPHILS NFR BLD AUTO: 1 % (ref 0–1)
BUN SERPL-MCNC: 20 MG/DL (ref 5–25)
CALCIUM SERPL-MCNC: 9.4 MG/DL (ref 8.4–10.2)
CHLORIDE SERPL-SCNC: 96 MMOL/L (ref 96–108)
CO2 SERPL-SCNC: 33 MMOL/L (ref 21–32)
CREAT SERPL-MCNC: 0.9 MG/DL (ref 0.6–1.3)
EOSINOPHIL # BLD AUTO: 0.31 THOUSAND/ÂΜL (ref 0–0.61)
EOSINOPHIL NFR BLD AUTO: 3 % (ref 0–6)
ERYTHROCYTE [DISTWIDTH] IN BLOOD BY AUTOMATED COUNT: 16.5 % (ref 11.6–15.1)
GFR SERPL CREATININE-BSD FRML MDRD: 88 ML/MIN/1.73SQ M
GLUCOSE SERPL-MCNC: 107 MG/DL (ref 65–140)
GLUCOSE SERPL-MCNC: 121 MG/DL (ref 65–140)
GLUCOSE SERPL-MCNC: 133 MG/DL (ref 65–140)
GLUCOSE SERPL-MCNC: 201 MG/DL (ref 65–140)
GLUCOSE SERPL-MCNC: 207 MG/DL (ref 65–140)
HCT VFR BLD AUTO: 34.1 % (ref 36.5–49.3)
HGB BLD-MCNC: 10.4 G/DL (ref 12–17)
IMM GRANULOCYTES # BLD AUTO: 0.02 THOUSAND/UL (ref 0–0.2)
IMM GRANULOCYTES NFR BLD AUTO: 0 % (ref 0–2)
LYMPHOCYTES # BLD AUTO: 2.43 THOUSANDS/ÂΜL (ref 0.6–4.47)
LYMPHOCYTES NFR BLD AUTO: 24 % (ref 14–44)
MCH RBC QN AUTO: 25.6 PG (ref 26.8–34.3)
MCHC RBC AUTO-ENTMCNC: 30.5 G/DL (ref 31.4–37.4)
MCV RBC AUTO: 84 FL (ref 82–98)
MONOCYTES # BLD AUTO: 0.77 THOUSAND/ÂΜL (ref 0.17–1.22)
MONOCYTES NFR BLD AUTO: 8 % (ref 4–12)
NEUTROPHILS # BLD AUTO: 6.47 THOUSANDS/ÂΜL (ref 1.85–7.62)
NEUTS SEG NFR BLD AUTO: 64 % (ref 43–75)
NRBC BLD AUTO-RTO: 0 /100 WBCS
PLATELET # BLD AUTO: 357 THOUSANDS/UL (ref 149–390)
PMV BLD AUTO: 10 FL (ref 8.9–12.7)
POTASSIUM SERPL-SCNC: 3.9 MMOL/L (ref 3.5–5.3)
RBC # BLD AUTO: 4.07 MILLION/UL (ref 3.88–5.62)
SODIUM SERPL-SCNC: 135 MMOL/L (ref 135–147)
WBC # BLD AUTO: 10.09 THOUSAND/UL (ref 4.31–10.16)

## 2024-02-03 PROCEDURE — 80048 BASIC METABOLIC PNL TOTAL CA: CPT | Performed by: PHYSICIAN ASSISTANT

## 2024-02-03 PROCEDURE — 99232 SBSQ HOSP IP/OBS MODERATE 35: CPT | Performed by: PHYSICIAN ASSISTANT

## 2024-02-03 PROCEDURE — 82948 REAGENT STRIP/BLOOD GLUCOSE: CPT

## 2024-02-03 PROCEDURE — 85025 COMPLETE CBC W/AUTO DIFF WBC: CPT | Performed by: PHYSICIAN ASSISTANT

## 2024-02-03 RX ORDER — VANCOMYCIN HYDROCHLORIDE 750 MG/150ML
750 INJECTION, SOLUTION INTRAVENOUS EVERY 12 HOURS
Status: DISCONTINUED | OUTPATIENT
Start: 2024-02-04 | End: 2024-02-04

## 2024-02-03 RX ORDER — CEFEPIME HYDROCHLORIDE 2 G/50ML
2000 INJECTION, SOLUTION INTRAVENOUS EVERY 12 HOURS
Status: DISCONTINUED | OUTPATIENT
Start: 2024-02-03 | End: 2024-02-06 | Stop reason: HOSPADM

## 2024-02-03 RX ADMIN — PRAVASTATIN SODIUM 20 MG: 20 TABLET ORAL at 16:30

## 2024-02-03 RX ADMIN — DOCUSATE SODIUM 100 MG: 100 CAPSULE, LIQUID FILLED ORAL at 09:11

## 2024-02-03 RX ADMIN — MORPHINE SULFATE 4 MG: 4 INJECTION, SOLUTION INTRAMUSCULAR; INTRAVENOUS at 23:29

## 2024-02-03 RX ADMIN — METHOCARBAMOL TABLETS 750 MG: 500 TABLET, COATED ORAL at 21:14

## 2024-02-03 RX ADMIN — FINASTERIDE 5 MG: 5 TABLET, FILM COATED ORAL at 09:11

## 2024-02-03 RX ADMIN — GABAPENTIN 400 MG: 400 CAPSULE ORAL at 18:21

## 2024-02-03 RX ADMIN — ASPIRIN 81 MG: 81 TABLET, COATED ORAL at 13:12

## 2024-02-03 RX ADMIN — LOSARTAN POTASSIUM 100 MG: 50 TABLET, FILM COATED ORAL at 09:11

## 2024-02-03 RX ADMIN — NIFEDIPINE 120 MG: 30 TABLET, EXTENDED RELEASE ORAL at 09:11

## 2024-02-03 RX ADMIN — FOLIC ACID 2000 MCG: 1 TABLET ORAL at 09:12

## 2024-02-03 RX ADMIN — HEPARIN SODIUM 5000 UNITS: 5000 INJECTION INTRAVENOUS; SUBCUTANEOUS at 13:12

## 2024-02-03 RX ADMIN — POLYETHYLENE GLYCOL 3350 17 G: 17 POWDER, FOR SOLUTION ORAL at 09:12

## 2024-02-03 RX ADMIN — CEFEPIME HYDROCHLORIDE 2000 MG: 2 INJECTION, SOLUTION INTRAVENOUS at 16:30

## 2024-02-03 RX ADMIN — OXYCODONE HYDROCHLORIDE 10 MG: 10 TABLET ORAL at 03:56

## 2024-02-03 RX ADMIN — LIDOCAINE 5% 1 PATCH: 700 PATCH TOPICAL at 09:12

## 2024-02-03 RX ADMIN — METHOCARBAMOL TABLETS 750 MG: 500 TABLET, COATED ORAL at 03:56

## 2024-02-03 RX ADMIN — MIRTAZAPINE 45 MG: 15 TABLET, FILM COATED ORAL at 21:14

## 2024-02-03 RX ADMIN — CLONAZEPAM 1 MG: 1 TABLET ORAL at 09:12

## 2024-02-03 RX ADMIN — CLONAZEPAM 1 MG: 1 TABLET ORAL at 18:21

## 2024-02-03 RX ADMIN — TAMSULOSIN HYDROCHLORIDE 0.4 MG: 0.4 CAPSULE ORAL at 16:30

## 2024-02-03 RX ADMIN — Medication 3 MG: at 21:14

## 2024-02-03 RX ADMIN — HEPARIN SODIUM 5000 UNITS: 5000 INJECTION INTRAVENOUS; SUBCUTANEOUS at 21:14

## 2024-02-03 RX ADMIN — MORPHINE SULFATE 4 MG: 4 INJECTION, SOLUTION INTRAMUSCULAR; INTRAVENOUS at 00:52

## 2024-02-03 RX ADMIN — GABAPENTIN 400 MG: 400 CAPSULE ORAL at 09:12

## 2024-02-03 RX ADMIN — HEPARIN SODIUM 5000 UNITS: 5000 INJECTION INTRAVENOUS; SUBCUTANEOUS at 06:38

## 2024-02-03 RX ADMIN — SERTRALINE HYDROCHLORIDE 100 MG: 100 TABLET ORAL at 09:12

## 2024-02-03 RX ADMIN — METOPROLOL SUCCINATE 75 MG: 50 TABLET, EXTENDED RELEASE ORAL at 09:11

## 2024-02-03 RX ADMIN — OXYCODONE HYDROCHLORIDE 10 MG: 10 TABLET ORAL at 18:23

## 2024-02-03 RX ADMIN — INSULIN LISPRO 2 UNITS: 100 INJECTION, SOLUTION INTRAVENOUS; SUBCUTANEOUS at 13:12

## 2024-02-03 RX ADMIN — MORPHINE SULFATE 4 MG: 4 INJECTION, SOLUTION INTRAMUSCULAR; INTRAVENOUS at 06:45

## 2024-02-03 RX ADMIN — CLONAZEPAM 0.5 MG: 0.5 TABLET ORAL at 21:14

## 2024-02-03 RX ADMIN — DOCUSATE SODIUM 100 MG: 100 CAPSULE, LIQUID FILLED ORAL at 18:21

## 2024-02-03 RX ADMIN — SENNOSIDES 17.2 MG: 8.6 TABLET, FILM COATED ORAL at 21:14

## 2024-02-03 RX ADMIN — ACETAMINOPHEN 325MG 650 MG: 325 TABLET ORAL at 21:14

## 2024-02-03 RX ADMIN — HYDROCHLOROTHIAZIDE 25 MG: 25 TABLET ORAL at 09:12

## 2024-02-03 RX ADMIN — INSULIN LISPRO 2 UNITS: 100 INJECTION, SOLUTION INTRAVENOUS; SUBCUTANEOUS at 21:27

## 2024-02-03 RX ADMIN — VANCOMYCIN HYDROCHLORIDE 2000 MG: 1 INJECTION, POWDER, LYOPHILIZED, FOR SOLUTION INTRAVENOUS at 17:19

## 2024-02-03 NOTE — PLAN OF CARE
Problem: PAIN - ADULT  Goal: Verbalizes/displays adequate comfort level or baseline comfort level  Description: Interventions:  - Encourage patient to monitor pain and request assistance  - Assess pain using appropriate pain scale  - Administer analgesics based on type and severity of pain and evaluate response  - Implement non-pharmacological measures as appropriate and evaluate response  - Consider cultural and social influences on pain and pain management  - Notify physician/advanced practitioner if interventions unsuccessful or patient reports new pain  Outcome: Progressing     Problem: INFECTION - ADULT  Goal: Absence or prevention of progression during hospitalization  Description: INTERVENTIONS:  - Assess and monitor for signs and symptoms of infection  - Monitor lab/diagnostic results  - Monitor all insertion sites, i.e. indwelling lines, tubes, and drains  - Monitor endotracheal if appropriate and nasal secretions for changes in amount and color  - [place] appropriate cooling/warming therapies per order  - Administer medications as ordered  - Instruct and encourage patient and family to use good hand hygiene technique  - Identify and instruct in appropriate isolation precautions for identified infection/condition  Outcome: Progressing  Goal: Absence of fever/infection during neutropenic period  Description: INTERVENTIONS:  - Monitor WBC    Outcome: Progressing     Problem: SAFETY ADULT  Goal: Maintain or return to baseline ADL function  Description: INTERVENTIONS:  -  Assess patient's ability to carry out ADLs; assess patient's baseline for ADL function and identify physical deficits which impact ability to perform ADLs (bathing, care of mouth/teeth, toileting, grooming, dressing, etc.)  - Assess/evaluate cause of self-care deficits   - Assess range of motion  - Assess patient's mobility; develop plan if impaired  - Assess patient's need for assistive devices and provide as appropriate  - Encourage  maximum independence but intervene and supervise when necessary  - Involve family in performance of ADLs  - Assess for home care needs following discharge   - Consider OT consult to assist with ADL evaluation and planning for discharge  - Provide patient education as appropriate  Outcome: Progressing     Problem: DISCHARGE PLANNING  Goal: Discharge to home or other facility with appropriate resources  Description: INTERVENTIONS:  - Identify barriers to discharge w/patient and caregiver  - Arrange for needed discharge resources and transportation as appropriate  - Identify discharge learning needs (meds, wound care, etc.)  - Arrange for interpretive services to assist at discharge as needed  - Refer to Case Management Department for coordinating discharge planning if the patient needs post-hospital services based on physician/advanced practitioner order or complex needs related to functional status, cognitive ability, or social support system  Outcome: Progressing     Problem: Knowledge Deficit  Goal: Patient/family/caregiver demonstrates understanding of disease process, treatment plan, medications, and discharge instructions  Description: Complete learning assessment and assess knowledge base.  Interventions:  - Provide teaching at level of understanding  - Provide teaching via preferred learning methods  Outcome: Progressing     Problem: MUSCULOSKELETAL - ADULT  Goal: Maintain or return mobility to safest level of function  Description: INTERVENTIONS:  - Assess patient's ability to carry out ADLs; assess patient's baseline for ADL function and identify physical deficits which impact ability to perform ADLs (bathing, care of mouth/teeth, toileting, grooming, dressing, etc.)  - Assess/evaluate cause of self-care deficits   - Assess range of motion  - Assess patient's mobility  - Assess patient's need for assistive devices and provide as appropriate  - Encourage maximum independence but intervene and supervise when  necessary  - Involve family in performance of ADLs  - Assess for home care needs following discharge   - Consider OT consult to assist with ADL evaluation and planning for discharge  - Provide patient education as appropriate  Outcome: Progressing  Goal: Maintain proper alignment of affected body part  Description: INTERVENTIONS:  - Support, maintain and protect limb and body alignment  - Provide patient/ family with appropriate education  Outcome: Progressing     Problem: Prexisting or High Potential for Compromised Skin Integrity  Goal: Skin integrity is maintained or improved  Description: INTERVENTIONS:  - Identify patients at risk for skin breakdown  - Assess and monitor skin integrity  - Assess and monitor nutrition and hydration status  - Monitor labs   - Assess for incontinence   - Turn and reposition patient  - Assist with mobility/ambulation  - Relieve pressure over bony prominences  - Avoid friction and shearing  - Provide appropriate hygiene as needed including keeping skin clean and dry  - Evaluate need for skin moisturizer/barrier cream  - Collaborate with interdisciplinary team   - Patient/family teaching  - Consider wound care consult   Outcome: Progressing

## 2024-02-03 NOTE — ASSESSMENT & PLAN NOTE
MRI consistent with discitis/osteomyelitis of L1-2, L4-5, L5-S1 regions.  Neurosurgery consulted  Per ER discussion with neurosurgery, obtain blood cultures and hold off on antibiotics pending results unless patient clinically declines  WBC WNL, afebrile  Consider transfer if patient becomes unstable/develops red flag symptoms however exam remains stable  Prior provider discussed with ID - recommend IR consult   Performed 1/30 for path  Performed again 2/1 with culture - so far no growth  Blood cultures negative x2 negative to date  Discussed with ID and neurosurgery 2/3 - given elevated ESR/CRP and imaging findings, will empirically treat for OM/discitis.  Likely will require course of IV abx with PICC line placement.  ID to evaluate this afternoon

## 2024-02-03 NOTE — ASSESSMENT & PLAN NOTE
S/P partial thyroidectomy  Not maintained on levothyroxine - levels have been stable  Follows with [place] - in remission

## 2024-02-03 NOTE — PLAN OF CARE
Problem: Potential for Falls  Goal: Patient will remain free of falls  Description: INTERVENTIONS:  - Educate patient/family on patient safety including physical limitations  - Instruct patient to call for assistance with activity   - Consult OT/PT to assist with strengthening/mobility   - Keep Call bell within reach  - Keep bed low and locked with side rails adjusted as appropriate  - Keep care items and personal belongings within reach  - Initiate and maintain comfort rounds  - Make Fall Risk Sign visible to staff  - Offer Toileting every 2 Hours, in advance of need  - Initiate/Maintain call bell alarm  - Obtain necessary fall risk management equipment: call bell usage  - Apply yellow socks and bracelet for high fall risk patients  - Consider moving patient to room near nurses station  Outcome: Progressing     Problem: PAIN - ADULT  Goal: Verbalizes/displays adequate comfort level or baseline comfort level  Description: Interventions:  - Encourage patient to monitor pain and request assistance  - Assess pain using appropriate pain scale  - Administer analgesics based on type and severity of pain and evaluate response  - Implement non-pharmacological measures as appropriate and evaluate response  - Consider cultural and social influences on pain and pain management  - Notify physician/advanced practitioner if interventions unsuccessful or patient reports new pain  Outcome: Progressing     Problem: INFECTION - ADULT  Goal: Absence or prevention of progression during hospitalization  Description: INTERVENTIONS:  - Assess and monitor for signs and symptoms of infection  - Monitor lab/diagnostic results  - Monitor all insertion sites, i.e. indwelling lines, tubes, and drains  - Monitor endotracheal if appropriate and nasal secretions for changes in amount and color  - [place] appropriate cooling/warming therapies per order  - Administer medications as ordered  - Instruct and encourage patient and family to use good  hand hygiene technique  - Identify and instruct in appropriate isolation precautions for identified infection/condition  Outcome: Progressing  Goal: Absence of fever/infection during neutropenic period  Description: INTERVENTIONS:  - Monitor WBC    Outcome: Progressing

## 2024-02-03 NOTE — ASSESSMENT & PLAN NOTE
Lab Results   Component Value Date    HGBA1C 8.2 (H) 01/24/2024       Recent Labs     02/02/24  1602 02/02/24  2138 02/03/24  0825 02/03/24  1115   POCGLU 122 151* 121 207*         Blood Sugar Average: Last 72 hrs:  (P) 160.5  Maintained on metformin and jardiance outpatient.   Hold while admitted, can resume on discharge  SSI with meals while admitted  Current Accu-Cheks and hypoglycemia protocol

## 2024-02-03 NOTE — PROGRESS NOTES
[facility]  Progress Note  Name: Juan San I  MRN: [id number]  Unit/Bed#: -Fidel I Date of Admission: 1/24/2024   Date of Service: 2/3/2024 I Hospital Day: 10    Assessment/Plan   * Discitis of lumbosacral region  Assessment & Plan  MRI consistent with discitis/osteomyelitis of L1-2, L4-5, L5-S1 regions.  Neurosurgery consulted  Per ER discussion with neurosurgery, obtain blood cultures and hold off on antibiotics pending results unless patient clinically declines  WBC WNL, afebrile  Consider transfer if patient becomes unstable/develops red flag symptoms however exam remains stable  Prior provider discussed with ID - recommend IR consult   Performed 1/30 for path  Performed again 2/1 with culture - so far no growth  Blood cultures negative x2 negative to date  Discussed with ID and neurosurgery 2/3 - given elevated ESR/CRP and imaging findings, will empirically treat for OM/discitis.  Likely will require course of IV abx with PICC line placement.  ID to evaluate this afternoon    Hypertension  Assessment & Plan  Home regimen includes losartan/HCTZ, metoprolol, nifedipine  Blood pressure stable    Benign prostatic hyperplasia with urinary retention  Assessment & Plan  Continue finasteride and tamsulosin  Urinary retention protocol    Thyroid cancer (HCC)  Assessment & Plan  S/P partial thyroidectomy  Not maintained on levothyroxine - levels have been stable  Follows with Horacio Jackson - in remission    Herniated nucleus pulposus of lumbosacral region  Assessment & Plan  S/P surgical intervention April 2023    DM (diabetes mellitus) (HCC)  Assessment & Plan  Lab Results   Component Value Date    HGBA1C 8.2 (H) 01/24/2024       Recent Labs     02/02/24  1602 02/02/24  2138 02/03/24  0825 02/03/24  1115   POCGLU 122 151* 121 207*         Blood Sugar Average: Last 72 hrs:  (P) 160.5  Maintained on metformin and jardiance outpatient.   Hold while admitted, can resume on discharge  SSI  with meals while admitted  Current Accu-Cheks and hypoglycemia protocol    Cirrhosis, alcoholic (HCC)  Assessment & Plan  Continue outpatient follow-up  Follows with PCP         VTE Pharmacologic Prophylaxis: VTE Score: 3 Moderate Risk (Score 3-4) - Pharmacological DVT Prophylaxis Ordered: heparin.    Mobility:   Basic Mobility Inpatient Raw Score: 20  JH-HLM Goal: 6: Walk 10 steps or more  JH-HLM Achieved: 8: Walk 250 feet ot more  HLM Goal achieved. Continue to encourage appropriate mobility.    Patient Centered Rounds: I performed bedside rounds with nursing staff today.   Discussions with Specialists or Other Care Team Provider: ID, neurosurgery AP    Education and Discussions with Family / Patient: Patient declined call to .     Total Time Spent on Date of Encounter in care of patient: 45 mins. This time was spent on one or more of the following: performing physical exam; counseling and coordination of care; obtaining or reviewing history; documenting in the medical record; reviewing/ordering tests, medications or procedures; communicating with other healthcare professionals and discussing with patient's family/caregivers.    Current Length of Stay: 10 day(s)  Current Patient Status: Inpatient   Certification Statement: The patient will continue to require additional inpatient hospital stay due to ID recommendations  Discharge Plan: Anticipate discharge in 48-72 hrs to home with home services.    Code Status: Level 1 - Full Code    Subjective:   Patient continues to feel well.  Back pain at baseline.  Denies chest pain/palpitations, shortness of breath, nausea/vomiting, abdominal pain, fever/chills.  Denies lower extremity weakness, saddle anesthesia or bowel/bladder incontinence.    Objective:     Vitals:   Temp (24hrs), Av.9 °F (36.6 °C), Min:97.3 °F (36.3 °C), Max:98.4 °F (36.9 °C)    Temp:  [97.3 °F (36.3 °C)-98.4 °F (36.9 °C)] 97.9 °F (36.6 °C)  HR:  [63-77] 77  Resp:  [14] 14  BP:  (129-162)/(57-83) 162/83  SpO2:  [98 %-100 %] 100 %  Body mass index is 26.58 kg/m².     Input and Output Summary (last 24 hours):     Intake/Output Summary (Last 24 hours) at 2/3/2024 1154  Last data filed at 2/3/2024 1001  Gross per 24 hour   Intake 1080 ml   Output --   Net 1080 ml       Physical Exam:   Physical Exam  Vitals and nursing note reviewed.   Constitutional:       General: He is not in acute distress.     Appearance: He is well-developed.      Comments: No acute distress   HENT:      Head: Normocephalic and atraumatic.   Eyes:      General: No scleral icterus.     Extraocular Movements: Extraocular movements intact.      Conjunctiva/sclera: Conjunctivae normal.   Cardiovascular:      Rate and Rhythm: Normal rate and regular rhythm.      Heart sounds: No murmur heard.  Pulmonary:      Effort: Pulmonary effort is normal. No respiratory distress.      Breath sounds: Normal breath sounds. No wheezing, rhonchi or rales.   Abdominal:      General: Bowel sounds are normal.      Palpations: Abdomen is soft.      Tenderness: There is no abdominal tenderness. There is no guarding or rebound.   Musculoskeletal:         General: No swelling.      Cervical back: Normal range of motion.      Comments: Strength 5/5 throughout, no LE edema   Skin:     General: Skin is warm and dry.      Capillary Refill: Capillary refill takes less than 2 seconds.   Neurological:      Mental Status: He is alert and oriented to person, place, and time.   Psychiatric:         Mood and Affect: Mood normal.         Speech: Speech normal.         Behavior: Behavior normal.          Additional Data:     Labs:  Results from last 7 days   Lab Units 02/03/24  0341   WBC Thousand/uL 10.09   HEMOGLOBIN g/dL 10.4*   HEMATOCRIT % 34.1*   PLATELETS Thousands/uL 357   NEUTROS PCT % 64   LYMPHS PCT % 24   MONOS PCT % 8   EOS PCT % 3     Results from last 7 days   Lab Units 02/03/24  0341   SODIUM mmol/L 135   POTASSIUM mmol/L 3.9   CHLORIDE mmol/L  96   CO2 mmol/L 33*   BUN mg/dL 20   CREATININE mg/dL 0.90   ANION GAP mmol/L 6   CALCIUM mg/dL 9.4   GLUCOSE RANDOM mg/dL 107         Results from last 7 days   Lab Units 02/03/24  1115 02/03/24  0825 02/02/24  2138 02/02/24  1602 02/02/24  1045 02/02/24  0749 02/01/24  2046 02/01/24  1632 02/01/24  1050 02/01/24  0744 01/31/24  2111 01/31/24  1654   POC GLUCOSE mg/dl 207* 121 151* 122 169* 174* 243* 165* 123 125 140 114               Lines/Drains:  Invasive Devices       Peripheral Intravenous Line  Duration             Peripheral IV 02/01/24 Left Wrist 1 day                          Imaging: No pertinent imaging reviewed.    Recent Cultures (last 7 days):   Results from last 7 days   Lab Units 02/01/24  1455   GRAM STAIN RESULT  No Polys  No organisms seen   WOUND CULTURE  No growth       Last 24 Hours Medication List:   Current Facility-Administered Medications   Medication Dose Route Frequency Provider Last Rate    acetaminophen  650 mg Oral Q6H PRN Sveta Smith PA-C      aluminum-magnesium hydroxide-simethicone  30 mL Oral Q6H PRN Sveta Smith PA-C      aspirin  81 mg Oral Daily Sintia Soto PA-C      clonazePAM  0.5 mg Oral Daily PRN Tiffany Cobb MD      clonazePAM  1 mg Oral BID Tiffany Cobb MD      docusate sodium  100 mg Oral BID Sailaja Salcedo PA-C      fentaNYL  25 mcg Intravenous Q5 Min PRN Portia Henderson MD      finasteride  5 mg Oral Daily Sveta Smith PA-C      fluticasone  1 spray Nasal Daily PRN Sveta Smith PA-C      folic acid  2,000 mcg Oral Daily Sveta Smith PA-C      gabapentin  400 mg Oral BID Sveta Smith PA-C      heparin (porcine)  5,000 Units Subcutaneous Q8H CHRISTIE Sveta Smith PA-C      losartan  100 mg Oral Daily Adelita Marina, DO      And    hydroCHLOROthiazide  25 mg Oral Daily Adelita Marina, DO      insulin lispro  1-6 Units Subcutaneous TID AC Sveta Smith PA-C      insulin lispro  1-6 Units Subcutaneous HS Sveta Smith PA-C      lactated ringers  10 mL/hr Intravenous Continuous Randee  Petey, CRNA 10 mL/hr (02/01/24 1633)    lidocaine  1 patch Topical Daily Sveta Smith, DANIEL      melatonin  3 mg Oral HS Sveta Smith, DANIEL      methocarbamol  750 mg Oral Q8H PRN Sveta Smith, DANIEL      metoprolol succinate  75 mg Oral QAM Sveta Smith, DANIEL      mirtazapine  45 mg Oral HS Sveta Smith, DANIEL      morphine injection  4 mg Intravenous Q4H PRN Sveta Smith, DANIEL      NIFEdipine  120 mg Oral Daily Adelita Marina DO      ondansetron  4 mg Intravenous Q6H PRN Sveta Smith, DANIEL      oxyCODONE  10 mg Oral Q4H PRN Sveta Smith, DANIEL      oxyCODONE  5 mg Oral Q4H PRN Sveta Smith, DANIEL      polyethylene glycol  17 g Oral Daily Sailaja Salcedo PA-C      pravastatin  20 mg Oral Daily With Dinner Sveta Smith PA-C      senna  2 tablet Oral HS Sailaja Salcedo PA-C      sertraline  100 mg Oral Daily Adelita Marina DO      tamsulosin  0.4 mg Oral Daily With Dinner Sveta Smith PA-C          Today, Patient Was Seen By: Sintia Soto PA-C    **Please Note: This note may have been constructed using a voice recognition system.**

## 2024-02-03 NOTE — PROGRESS NOTES
Progress Note - Infectious Disease   Juan San 68 y.o. male MRN: [id number]  Unit/Bed#: -01 Encounter: [id number]    Assessment:  68-year-old man with epidural phlegmon, osteomyelitis/discitis, leukocytosis, type 2 diabetes, relatively recent spinal surgeries     Plan:  1) Back infection, leukocytosis - Pt. with findings consistent with infection in lumbar and sacral spine.  Unfortunately, culture thus far without growth.  Will request the microbiology hold culture for total of 7 days, but regardless given symptoms, lab findings, imaging findings, will treat empirically for osteomyelitis/discitis. As culture sample has been obtained, will begin broad antibiotics presently.    ===> Will START VANCOMYCIN, CEFEPIME, and follow patient for toxicity as well as clinical improvement whilst on these agents.  ===> Anticipate total of 42 days of antibiotics. Ideally, will have stable vancomycin dose within the next 48 hours or so, but regardless can place PICC line to facilitate outpatient antibiotic therapy.        2) T2DM - Optimize BG control in the setting of active infection.      ===> Discussed w/ ' team   ===> Will follow     Subjective/Objective   Overnight, no acute events.  Patient denies fever, chills, nausea, vomiting.  Lumbar culture from  without growth.    Temp:  [97.7 °F (36.5 °C)-98.4 °F (36.9 °C)] 97.7 °F (36.5 °C)  HR:  [64-77] 64  Resp:  [14] 14  BP: (143-162)/(78-83) 153/78  SpO2:  [98 %-100 %] 98 %  Temp (24hrs), Av °F (36.7 °C), Min:97.7 °F (36.5 °C), Max:98.4 °F (36.9 °C)  Current: Temperature: 97.7 °F (36.5 °C)    Physical Exam:   Gen: AA, NAD, VS reviewed  ENT: MMM  CV: No m/r/g, S1, S2  Pulm: Lungs CTAB, no respiratory distress  ABD: S/NT/ND  Skin: No rash on exposed skin  Psych: Oriented, nl. affect    Invasive Devices       Peripheral Intravenous Line  Duration             Peripheral IV 24 Left Wrist 2 days                    Lab, Imaging and other studies: I have  personally reviewed pertinent reports.

## 2024-02-04 LAB
ANION GAP SERPL CALCULATED.3IONS-SCNC: 8 MMOL/L
BACTERIA SPEC ANAEROBE CULT: NO GROWTH
BACTERIA WND AEROBE CULT: NO GROWTH
BASOPHILS # BLD AUTO: 0.09 THOUSANDS/ÂΜL (ref 0–0.1)
BASOPHILS NFR BLD AUTO: 1 % (ref 0–1)
BUN SERPL-MCNC: 16 MG/DL (ref 5–25)
CALCIUM SERPL-MCNC: 9 MG/DL (ref 8.4–10.2)
CHLORIDE SERPL-SCNC: 97 MMOL/L (ref 96–108)
CO2 SERPL-SCNC: 29 MMOL/L (ref 21–32)
CREAT SERPL-MCNC: 0.81 MG/DL (ref 0.6–1.3)
EOSINOPHIL # BLD AUTO: 0.28 THOUSAND/ÂΜL (ref 0–0.61)
EOSINOPHIL NFR BLD AUTO: 3 % (ref 0–6)
ERYTHROCYTE [DISTWIDTH] IN BLOOD BY AUTOMATED COUNT: 16.6 % (ref 11.6–15.1)
GFR SERPL CREATININE-BSD FRML MDRD: 92 ML/MIN/1.73SQ M
GLUCOSE SERPL-MCNC: 146 MG/DL (ref 65–140)
GLUCOSE SERPL-MCNC: 151 MG/DL (ref 65–140)
GLUCOSE SERPL-MCNC: 153 MG/DL (ref 65–140)
GLUCOSE SERPL-MCNC: 194 MG/DL (ref 65–140)
GLUCOSE SERPL-MCNC: 201 MG/DL (ref 65–140)
GRAM STN SPEC: NORMAL
GRAM STN SPEC: NORMAL
HCT VFR BLD AUTO: 33.4 % (ref 36.5–49.3)
HGB BLD-MCNC: 10.1 G/DL (ref 12–17)
IMM GRANULOCYTES # BLD AUTO: 0.04 THOUSAND/UL (ref 0–0.2)
IMM GRANULOCYTES NFR BLD AUTO: 0 % (ref 0–2)
LYMPHOCYTES # BLD AUTO: 2.43 THOUSANDS/ÂΜL (ref 0.6–4.47)
LYMPHOCYTES NFR BLD AUTO: 25 % (ref 14–44)
MCH RBC QN AUTO: 25.3 PG (ref 26.8–34.3)
MCHC RBC AUTO-ENTMCNC: 30.2 G/DL (ref 31.4–37.4)
MCV RBC AUTO: 84 FL (ref 82–98)
MONOCYTES # BLD AUTO: 0.86 THOUSAND/ÂΜL (ref 0.17–1.22)
MONOCYTES NFR BLD AUTO: 9 % (ref 4–12)
NEUTROPHILS # BLD AUTO: 6.2 THOUSANDS/ÂΜL (ref 1.85–7.62)
NEUTS SEG NFR BLD AUTO: 62 % (ref 43–75)
NRBC BLD AUTO-RTO: 0 /100 WBCS
PLATELET # BLD AUTO: 333 THOUSANDS/UL (ref 149–390)
PMV BLD AUTO: 9.5 FL (ref 8.9–12.7)
POTASSIUM SERPL-SCNC: 3.8 MMOL/L (ref 3.5–5.3)
RBC # BLD AUTO: 4 MILLION/UL (ref 3.88–5.62)
SODIUM SERPL-SCNC: 134 MMOL/L (ref 135–147)
WBC # BLD AUTO: 9.9 THOUSAND/UL (ref 4.31–10.16)

## 2024-02-04 PROCEDURE — 99232 SBSQ HOSP IP/OBS MODERATE 35: CPT | Performed by: PHYSICIAN ASSISTANT

## 2024-02-04 PROCEDURE — 02HV33Z INSERTION OF INFUSION DEVICE INTO SUPERIOR VENA CAVA, PERCUTANEOUS APPROACH: ICD-10-PCS | Performed by: RADIOLOGY

## 2024-02-04 PROCEDURE — 82948 REAGENT STRIP/BLOOD GLUCOSE: CPT

## 2024-02-04 PROCEDURE — 80048 BASIC METABOLIC PNL TOTAL CA: CPT | Performed by: PHYSICIAN ASSISTANT

## 2024-02-04 PROCEDURE — 85025 COMPLETE CBC W/AUTO DIFF WBC: CPT | Performed by: PHYSICIAN ASSISTANT

## 2024-02-04 RX ORDER — SENNOSIDES 8.6 MG
1 TABLET ORAL 2 TIMES DAILY
Status: DISCONTINUED | OUTPATIENT
Start: 2024-02-04 | End: 2024-02-06 | Stop reason: HOSPADM

## 2024-02-04 RX ORDER — VANCOMYCIN HYDROCHLORIDE 1 G/200ML
1000 INJECTION, SOLUTION INTRAVENOUS EVERY 12 HOURS
Status: DISCONTINUED | OUTPATIENT
Start: 2024-02-04 | End: 2024-02-05

## 2024-02-04 RX ADMIN — MORPHINE SULFATE 4 MG: 4 INJECTION, SOLUTION INTRAMUSCULAR; INTRAVENOUS at 06:06

## 2024-02-04 RX ADMIN — DOCUSATE SODIUM 100 MG: 100 CAPSULE, LIQUID FILLED ORAL at 17:52

## 2024-02-04 RX ADMIN — GABAPENTIN 400 MG: 400 CAPSULE ORAL at 08:53

## 2024-02-04 RX ADMIN — SENNOSIDES 8.6 MG: 8.6 TABLET, FILM COATED ORAL at 13:14

## 2024-02-04 RX ADMIN — HEPARIN SODIUM 5000 UNITS: 5000 INJECTION INTRAVENOUS; SUBCUTANEOUS at 05:00

## 2024-02-04 RX ADMIN — OXYCODONE HYDROCHLORIDE 10 MG: 10 TABLET ORAL at 02:55

## 2024-02-04 RX ADMIN — FOLIC ACID 2000 MCG: 1 TABLET ORAL at 08:53

## 2024-02-04 RX ADMIN — MORPHINE SULFATE 4 MG: 4 INJECTION, SOLUTION INTRAMUSCULAR; INTRAVENOUS at 18:01

## 2024-02-04 RX ADMIN — LOSARTAN POTASSIUM 100 MG: 50 TABLET, FILM COATED ORAL at 08:53

## 2024-02-04 RX ADMIN — SERTRALINE HYDROCHLORIDE 100 MG: 100 TABLET ORAL at 08:54

## 2024-02-04 RX ADMIN — ACETAMINOPHEN 325MG 650 MG: 325 TABLET ORAL at 19:14

## 2024-02-04 RX ADMIN — DOCUSATE SODIUM 100 MG: 100 CAPSULE, LIQUID FILLED ORAL at 08:53

## 2024-02-04 RX ADMIN — INSULIN LISPRO 2 UNITS: 100 INJECTION, SOLUTION INTRAVENOUS; SUBCUTANEOUS at 21:13

## 2024-02-04 RX ADMIN — OXYCODONE HYDROCHLORIDE 10 MG: 10 TABLET ORAL at 15:36

## 2024-02-04 RX ADMIN — POLYETHYLENE GLYCOL 3350 17 G: 17 POWDER, FOR SOLUTION ORAL at 08:53

## 2024-02-04 RX ADMIN — GABAPENTIN 400 MG: 400 CAPSULE ORAL at 17:52

## 2024-02-04 RX ADMIN — Medication 3 MG: at 21:12

## 2024-02-04 RX ADMIN — VANCOMYCIN HYDROCHLORIDE 1000 MG: 1 INJECTION, SOLUTION INTRAVENOUS at 16:01

## 2024-02-04 RX ADMIN — METHOCARBAMOL TABLETS 750 MG: 500 TABLET, COATED ORAL at 21:12

## 2024-02-04 RX ADMIN — CLONAZEPAM 1 MG: 1 TABLET ORAL at 08:53

## 2024-02-04 RX ADMIN — PRAVASTATIN SODIUM 20 MG: 20 TABLET ORAL at 15:36

## 2024-02-04 RX ADMIN — TAMSULOSIN HYDROCHLORIDE 0.4 MG: 0.4 CAPSULE ORAL at 15:36

## 2024-02-04 RX ADMIN — INSULIN LISPRO 2 UNITS: 100 INJECTION, SOLUTION INTRAVENOUS; SUBCUTANEOUS at 08:51

## 2024-02-04 RX ADMIN — HYDROCHLOROTHIAZIDE 25 MG: 25 TABLET ORAL at 08:53

## 2024-02-04 RX ADMIN — MIRTAZAPINE 45 MG: 15 TABLET, FILM COATED ORAL at 21:12

## 2024-02-04 RX ADMIN — CLONAZEPAM 1 MG: 1 TABLET ORAL at 17:52

## 2024-02-04 RX ADMIN — OXYCODONE HYDROCHLORIDE 10 MG: 10 TABLET ORAL at 19:14

## 2024-02-04 RX ADMIN — HEPARIN SODIUM 5000 UNITS: 5000 INJECTION INTRAVENOUS; SUBCUTANEOUS at 21:12

## 2024-02-04 RX ADMIN — VANCOMYCIN HYDROCHLORIDE 750 MG: 750 INJECTION, SOLUTION INTRAVENOUS at 05:36

## 2024-02-04 RX ADMIN — CEFEPIME HYDROCHLORIDE 2000 MG: 2 INJECTION, SOLUTION INTRAVENOUS at 03:04

## 2024-02-04 RX ADMIN — CLONAZEPAM 0.5 MG: 0.5 TABLET ORAL at 21:12

## 2024-02-04 RX ADMIN — NIFEDIPINE 120 MG: 30 TABLET, EXTENDED RELEASE ORAL at 08:53

## 2024-02-04 RX ADMIN — METOPROLOL SUCCINATE 75 MG: 50 TABLET, EXTENDED RELEASE ORAL at 08:53

## 2024-02-04 RX ADMIN — CEFEPIME HYDROCHLORIDE 2000 MG: 2 INJECTION, SOLUTION INTRAVENOUS at 16:01

## 2024-02-04 RX ADMIN — FINASTERIDE 5 MG: 5 TABLET, FILM COATED ORAL at 08:53

## 2024-02-04 RX ADMIN — ASPIRIN 81 MG: 81 TABLET, COATED ORAL at 08:53

## 2024-02-04 RX ADMIN — LIDOCAINE 5% 1 PATCH: 700 PATCH TOPICAL at 08:51

## 2024-02-04 NOTE — PLAN OF CARE
Problem: Potential for Falls  Goal: Patient will remain free of falls  Description: INTERVENTIONS:  - Educate patient/family on patient safety including physical limitations  - Instruct patient to call for assistance with activity   - Consult OT/PT to assist with strengthening/mobility   - Keep Call bell within reach  - Keep bed low and locked with side rails adjusted as appropriate  - Keep care items and personal belongings within reach  - Initiate and maintain comfort rounds  - Make Fall Risk Sign visible to staff  - Offer Toileting every 2 Hours, in advance of need  - Initiate/Maintain call bell alarm  - Obtain necessary fall risk management equipment: call bell usage  - Apply yellow socks and bracelet for high fall risk patients  - Consider moving patient to room near nurses station  Outcome: Progressing     Problem: PAIN - ADULT  Goal: Verbalizes/displays adequate comfort level or baseline comfort level  Description: Interventions:  - Encourage patient to monitor pain and request assistance  - Assess pain using appropriate pain scale  - Administer analgesics based on type and severity of pain and evaluate response  - Implement non-pharmacological measures as appropriate and evaluate response  - Consider cultural and social influences on pain and pain management  - Notify physician/advanced practitioner if interventions unsuccessful or patient reports new pain  Outcome: Progressing     Problem: INFECTION - ADULT  Goal: Absence or prevention of progression during hospitalization  Description: INTERVENTIONS:  - Assess and monitor for signs and symptoms of infection  - Monitor lab/diagnostic results  - Monitor all insertion sites, i.e. indwelling lines, tubes, and drains  - Monitor endotracheal if appropriate and nasal secretions for changes in amount and color  - [place] appropriate cooling/warming therapies per order  - Administer medications as ordered  - Instruct and encourage patient and family to use good  hand hygiene technique  - Identify and instruct in appropriate isolation precautions for identified infection/condition  Outcome: Progressing  Goal: Absence of fever/infection during neutropenic period  Description: INTERVENTIONS:  - Monitor WBC    Outcome: Progressing

## 2024-02-04 NOTE — PROGRESS NOTES
Juan San is a 68 y.o. male who is currently ordered Vancomycin IV with management by the Pharmacy Consult service.  Relevant clinical data and objective / subjective history reviewed.  Vancomycin Assessment:  Indication and Goal AUC/Trough: Bone/joint infection (goal -600, trough >10)  Clinical Status: stable  Micro:     Renal Function:  SCr: 0.81 mg/dL  CrCl: 89.1 mL/min  Renal replacement: Not on dialysis  Days of Therapy: 2  Current Dose: 750 mg q12h  Vancomycin Plan:  New Dosin mg q12h  Estimated AUC: 498 mcg*hr/mL  Estimated Trough: 15.7 mcg/mL  Next Level: 0600 on 24  Renal Function Monitoring: Daily BMP and UOP  Pharmacy will continue to follow closely for s/sx of nephrotoxicity, infusion reactions and appropriateness of therapy.  BMP and CBC will be ordered per protocol. We will continue to follow the patient’s culture results and clinical progress daily.    Sveta Stanley, Pharmacist

## 2024-02-04 NOTE — PROGRESS NOTES
[facility]  Progress Note  Name: Juan San I  MRN: [id number]  Unit/Bed#: -01 I Date of Admission: 1/24/2024   Date of Service: 2/4/2024 I Hospital Day: 11    Assessment/Plan   * Discitis of lumbosacral region  Assessment & Plan  MRI consistent with discitis/osteomyelitis of L1-2, L4-5, L5-S1 regions.  Neurosurgery consulted  Per ER discussion with neurosurgery, obtain blood cultures and hold off on antibiotics pending results unless patient clinically declines  WBC WNL, afebrile  Consider transfer if patient becomes unstable/develops red flag symptoms however exam remains stable  Prior provider discussed with ID - recommend IR consult   Performed 1/30 for path  Performed again 2/1 with culture - no growth  Blood cultures negative x2 negative to date  Discussed with ID and neurosurgery 2/3 - given elevated ESR/CRP and imaging findings, will empirically treat for OM/discitis.  Started on IV cefepime/vancomycin.  Will need PICC line placed - consent placed on chart 2/4.  CM notified that patient will require IV abx on discharge.  ID following    Hypertension  Assessment & Plan  Home regimen includes losartan/HCTZ, metoprolol, nifedipine  Blood pressure stable    Benign prostatic hyperplasia with urinary retention  Assessment & Plan  Continue finasteride and tamsulosin  Urinary retention protocol    Thyroid cancer (HCC)  Assessment & Plan  S/P partial thyroidectomy  Not maintained on levothyroxine - levels have been stable  Follows with Horacio Jackson - in remission    Herniated nucleus pulposus of lumbosacral region  Assessment & Plan  S/P surgical intervention April 2023    DM (diabetes mellitus) (HCC)  Assessment & Plan  Lab Results   Component Value Date    HGBA1C 8.2 (H) 01/24/2024       Recent Labs     02/03/24  1115 02/03/24  1659 02/03/24  2056 02/04/24  0728   POCGLU 207* 133 201* 194*         Blood Sugar Average: Last 72 hrs:  (P) 163.2383359163885208  Maintained on  metformin and jardiance outpatient.   Hold while admitted, can resume on discharge  SSI with meals while admitted  Current Accu-Cheks and hypoglycemia protocol    Cirrhosis, alcoholic (HCC)  Assessment & Plan  Continue outpatient follow-up  Follows with PCP         VTE Pharmacologic Prophylaxis: VTE Score: 3 Moderate Risk (Score 3-4) - Pharmacological DVT Prophylaxis Ordered: heparin.    Mobility:   Basic Mobility Inpatient Raw Score: 24  JH-HLM Goal: 8: Walk 250 feet or more  JH-HLM Achieved: 8: Walk 250 feet ot more  HLM Goal achieved. Continue to encourage appropriate mobility.    Patient Centered Rounds: I performed bedside rounds with nursing staff today.   Discussions with Specialists or Other Care Team Provider: YANN    Education and Discussions with Family / Patient: Patient declined call to .     Total Time Spent on Date of Encounter in care of patient: 40 mins. This time was spent on one or more of the following: performing physical exam; counseling and coordination of care; obtaining or reviewing history; documenting in the medical record; reviewing/ordering tests, medications or procedures; communicating with other healthcare professionals and discussing with patient's family/caregivers.    Current Length of Stay: 11 day(s)  Current Patient Status: Inpatient   Certification Statement: The patient will continue to require additional inpatient hospital stay due to PICC line placement, arranging IV antibiotics for discharge  Discharge Plan: Anticipate discharge in 24-48 hrs to home with home services.    Code Status: Level 1 - Full Code    Subjective:   Patient continues to feel well.  Back pain at baseline.  Denies chest pain/palpitations, shortness of breath, nausea/vomiting, abdominal pain, fever/chills.  No lower extremity weakness or new numbness/tingling.  Denies saddle anesthesia or bowel/bladder incontinence.    Objective:     Vitals:   Temp (24hrs), Av.1 °F (36.7 °C), Min:97.7 °F  (36.5 °C), Max:98.8 °F (37.1 °C)    Temp:  [97.7 °F (36.5 °C)-98.8 °F (37.1 °C)] 97.7 °F (36.5 °C)  HR:  [60-76] 60  BP: (138-153)/(47-78) 138/64  SpO2:  [98 %-99 %] 98 %  Body mass index is 26.58 kg/m².     Input and Output Summary (last 24 hours):     Intake/Output Summary (Last 24 hours) at 2/4/2024 1111  Last data filed at 2/3/2024 1317  Gross per 24 hour   Intake 300 ml   Output --   Net 300 ml       Physical Exam:   Physical Exam  Vitals and nursing note reviewed.   Constitutional:       General: He is not in acute distress.     Appearance: He is well-developed.      Comments: No acute distress   HENT:      Head: Normocephalic and atraumatic.   Eyes:      General: No scleral icterus.     Extraocular Movements: Extraocular movements intact.      Conjunctiva/sclera: Conjunctivae normal.   Cardiovascular:      Rate and Rhythm: Normal rate and regular rhythm.      Heart sounds: No murmur heard.  Pulmonary:      Effort: Pulmonary effort is normal. No respiratory distress.      Breath sounds: Normal breath sounds. No wheezing, rhonchi or rales.   Abdominal:      General: Bowel sounds are normal.      Palpations: Abdomen is soft.      Tenderness: There is no abdominal tenderness. There is no guarding or rebound.   Musculoskeletal:         General: No swelling.      Cervical back: Normal range of motion.      Comments: Strength 5/5 throughout, no edema   Skin:     General: Skin is warm and dry.      Capillary Refill: Capillary refill takes less than 2 seconds.   Neurological:      Mental Status: He is alert and oriented to person, place, and time.   Psychiatric:         Mood and Affect: Mood normal.         Speech: Speech normal.         Behavior: Behavior normal.          Additional Data:     Labs:  Results from last 7 days   Lab Units 02/04/24  0500   WBC Thousand/uL 9.90   HEMOGLOBIN g/dL 10.1*   HEMATOCRIT % 33.4*   PLATELETS Thousands/uL 333   NEUTROS PCT % 62   LYMPHS PCT % 25   MONOS PCT % 9   EOS PCT % 3      Results from last 7 days   Lab Units 02/04/24  0500   SODIUM mmol/L 134*   POTASSIUM mmol/L 3.8   CHLORIDE mmol/L 97   CO2 mmol/L 29   BUN mg/dL 16   CREATININE mg/dL 0.81   ANION GAP mmol/L 8   CALCIUM mg/dL 9.0   GLUCOSE RANDOM mg/dL 151*         Results from last 7 days   Lab Units 02/04/24  0728 02/03/24  2056 02/03/24  1659 02/03/24  1115 02/03/24  0825 02/02/24  2138 02/02/24  1602 02/02/24  1045 02/02/24  0749 02/01/24  2046 02/01/24  1632 02/01/24  1050   POC GLUCOSE mg/dl 194* 201* 133 207* 121 151* 122 169* 174* 243* 165* 123               Lines/Drains:  Invasive Devices       Peripheral Intravenous Line  Duration             Peripheral IV 02/01/24 Left Wrist 2 days                          Imaging: No pertinent imaging reviewed.    Recent Cultures (last 7 days):   Results from last 7 days   Lab Units 02/01/24  1455   GRAM STAIN RESULT  No Polys  No organisms seen   WOUND CULTURE  No growth       Last 24 Hours Medication List:   Current Facility-Administered Medications   Medication Dose Route Frequency Provider Last Rate    acetaminophen  650 mg Oral Q6H PRN Sveta Smith PA-C      aluminum-magnesium hydroxide-simethicone  30 mL Oral Q6H PRN Sveta Smith PA-C      aspirin  81 mg Oral Daily Sintia Soto PA-C      cefepime  2,000 mg Intravenous Q12H Antwon Shetty MD 2,000 mg (02/04/24 0304)    clonazePAM  0.5 mg Oral Daily PRN Tiffany Cobb MD      clonazePAM  1 mg Oral BID Tiffany Cobb MD      docusate sodium  100 mg Oral BID Sailaja Salcedo PA-C      fentaNYL  25 mcg Intravenous Q5 Min PRN Portia Henderson MD      finasteride  5 mg Oral Daily Sveta Smith PA-C      fluticasone  1 spray Nasal Daily PRN Sveta Smith PA-C      folic acid  2,000 mcg Oral Daily Sveta Smith PA-C      gabapentin  400 mg Oral BID Sveta Smith PA-C      heparin (porcine)  5,000 Units Subcutaneous Q8H [facility] Sveta Smith PA-C      losartan  100 mg Oral Daily Adelita Marina, DO      And    hydroCHLOROthiazide  25 mg Oral Daily Adelita Marina,  DO      insulin lispro  1-6 Units Subcutaneous TID AC Sveta Smith PA-C      insulin lispro  1-6 Units Subcutaneous HS Sveta Smith, DANIEL      lidocaine  1 patch Topical Daily Sveta Smith, DANIEL      melatonin  3 mg Oral HS Sveta Smith, DANIEL      methocarbamol  750 mg Oral Q8H PRN Sveta Smith, DANIEL      metoprolol succinate  75 mg Oral QAM Sveta Smith, DANIEL      mirtazapine  45 mg Oral HS Sveta Smith, DANIEL      morphine injection  4 mg Intravenous Q4H PRN Sveta Smith, DANIEL      NIFEdipine  120 mg Oral Daily Adelita Marina, DO      ondansetron  4 mg Intravenous Q6H PRN Sveta Smith, DANIEL      oxyCODONE  10 mg Oral Q4H PRN Sveta Smith, DANIEL      oxyCODONE  5 mg Oral Q4H PRN Sveta Smith, DANIEL      polyethylene glycol  17 g Oral Daily Sailaja Matias, DANIEL      pravastatin  20 mg Oral Daily With Dinner Sveta Smith PA-C      senna  1 tablet Oral BID Sintia Soto PA-C      sertraline  100 mg Oral Daily Adelita Marina, DO      tamsulosin  0.4 mg Oral Daily With Dinner Sveta Smith, DANIEL      vancomycin  1,000 mg Intravenous Q12H Antwon Shetty MD          Today, Patient Was Seen By: Sintia Soto PA-C    **Please Note: This note may have been constructed using a voice recognition system.**

## 2024-02-04 NOTE — PROGRESS NOTES
Juan San is a 68 y.o. male who is currently ordered Vancomycin IV with management by the Pharmacy Consult service.  Relevant clinical data and objective / subjective history reviewed.  Vancomycin Assessment:  Indication and Goal AUC/Trough: Bone/joint infection (goal -600, trough >10)  Clinical Status:  new  Micro:     Renal Function:  SCr: 0.9 mg/dL  CrCl: 80.7 mL/min  Renal replacement: Not on dialysis  Days of Therapy: 1  Current Dose: 2000 mg once  Vancomycin Plan:  New Dosin mg q12h  Estimated AUC: 408 mcg*hr/mL  Estimated Trough: 13.2 mcg/mL  Next Level: random trough with Am labs on    Renal Function Monitoring: Daily BMP and UOP  Pharmacy will continue to follow closely for s/sx of nephrotoxicity, infusion reactions and appropriateness of therapy.  BMP and CBC will be ordered per protocol. We will continue to follow the patient’s culture results and clinical progress daily.    Anne May, Pharmacist

## 2024-02-04 NOTE — PLAN OF CARE
Problem: PAIN - ADULT  Goal: Verbalizes/displays adequate comfort level or baseline comfort level  Description: Interventions:  - Encourage patient to monitor pain and request assistance  - Assess pain using appropriate pain scale  - Administer analgesics based on type and severity of pain and evaluate response  - Implement non-pharmacological measures as appropriate and evaluate response  - Consider cultural and social influences on pain and pain management  - Notify physician/advanced practitioner if interventions unsuccessful or patient reports new pain  Outcome: Progressing     Problem: INFECTION - ADULT  Goal: Absence or prevention of progression during hospitalization  Description: INTERVENTIONS:  - Assess and monitor for signs and symptoms of infection  - Monitor lab/diagnostic results  - Monitor all insertion sites, i.e. indwelling lines, tubes, and drains  - Monitor endotracheal if appropriate and nasal secretions for changes in amount and color  - [place] appropriate cooling/warming therapies per order  - Administer medications as ordered  - Instruct and encourage patient and family to use good hand hygiene technique  - Identify and instruct in appropriate isolation precautions for identified infection/condition  Outcome: Progressing  Goal: Absence of fever/infection during neutropenic period  Description: INTERVENTIONS:  - Monitor WBC    Outcome: Progressing     Problem: SAFETY ADULT  Goal: Maintain or return to baseline ADL function  Description: INTERVENTIONS:  -  Assess patient's ability to carry out ADLs; assess patient's baseline for ADL function and identify physical deficits which impact ability to perform ADLs (bathing, care of mouth/teeth, toileting, grooming, dressing, etc.)  - Assess/evaluate cause of self-care deficits   - Assess range of motion  - Assess patient's mobility; develop plan if impaired  - Assess patient's need for assistive devices and provide as appropriate  - Encourage  maximum independence but intervene and supervise when necessary  - Involve family in performance of ADLs  - Assess for home care needs following discharge   - Consider OT consult to assist with ADL evaluation and planning for discharge  - Provide patient education as appropriate  Outcome: Progressing     Problem: DISCHARGE PLANNING  Goal: Discharge to home or other facility with appropriate resources  Description: INTERVENTIONS:  - Identify barriers to discharge w/patient and caregiver  - Arrange for needed discharge resources and transportation as appropriate  - Identify discharge learning needs (meds, wound care, etc.)  - Arrange for interpretive services to assist at discharge as needed  - Refer to Case Management Department for coordinating discharge planning if the patient needs post-hospital services based on physician/advanced practitioner order or complex needs related to functional status, cognitive ability, or social support system  Outcome: Progressing     Problem: Knowledge Deficit  Goal: Patient/family/caregiver demonstrates understanding of disease process, treatment plan, medications, and discharge instructions  Description: Complete learning assessment and assess knowledge base.  Interventions:  - Provide teaching at level of understanding  - Provide teaching via preferred learning methods  Outcome: Progressing     Problem: MUSCULOSKELETAL - ADULT  Goal: Maintain or return mobility to safest level of function  Description: INTERVENTIONS:  - Assess patient's ability to carry out ADLs; assess patient's baseline for ADL function and identify physical deficits which impact ability to perform ADLs (bathing, care of mouth/teeth, toileting, grooming, dressing, etc.)  - Assess/evaluate cause of self-care deficits   - Assess range of motion  - Assess patient's mobility  - Assess patient's need for assistive devices and provide as appropriate  - Encourage maximum independence but intervene and supervise when  necessary  - Involve family in performance of ADLs  - Assess for home care needs following discharge   - Consider OT consult to assist with ADL evaluation and planning for discharge  - Provide patient education as appropriate  Outcome: Progressing  Goal: Maintain proper alignment of affected body part  Description: INTERVENTIONS:  - Support, maintain and protect limb and body alignment  - Provide patient/ family with appropriate education  Outcome: Progressing     Problem: Prexisting or High Potential for Compromised Skin Integrity  Goal: Skin integrity is maintained or improved  Description: INTERVENTIONS:  - Identify patients at risk for skin breakdown  - Assess and monitor skin integrity  - Assess and monitor nutrition and hydration status  - Monitor labs   - Assess for incontinence   - Turn and reposition patient  - Assist with mobility/ambulation  - Relieve pressure over bony prominences  - Avoid friction and shearing  - Provide appropriate hygiene as needed including keeping skin clean and dry  - Evaluate need for skin moisturizer/barrier cream  - Collaborate with interdisciplinary team   - Patient/family teaching  - Consider wound care consult   Outcome: Progressing

## 2024-02-04 NOTE — ASSESSMENT & PLAN NOTE
S/P partial thyroidectomy  Not maintained on levothyroxine - levels have been stable  Follows with [facility] - in remission

## 2024-02-04 NOTE — ASSESSMENT & PLAN NOTE
Lab Results   Component Value Date    HGBA1C 8.2 (H) 01/24/2024       Recent Labs     02/03/24  1115 02/03/24  1659 02/03/24 2056 02/04/24  0728   POCGLU 207* 133 201* 194*         Blood Sugar Average: Last 72 hrs:  (P) [id number]  Maintained on metformin and jardiance outpatient.   Hold while admitted, can resume on discharge  SSI with meals while admitted  Current Accu-Cheks and hypoglycemia protocol

## 2024-02-04 NOTE — ASSESSMENT & PLAN NOTE
MRI consistent with discitis/osteomyelitis of L1-2, L4-5, L5-S1 regions.  Neurosurgery consulted  Per ER discussion with neurosurgery, obtain blood cultures and hold off on antibiotics pending results unless patient clinically declines  WBC WNL, afebrile  Consider transfer if patient becomes unstable/develops red flag symptoms however exam remains stable  Prior provider discussed with ID - recommend IR consult   Performed 1/30 for path  Performed again 2/1 with culture - no growth  Blood cultures negative x2 negative to date  Discussed with ID and neurosurgery 2/3 - given elevated ESR/CRP and imaging findings, will empirically treat for OM/discitis.  Started on IV cefepime/vancomycin.  Will need PICC line placed - consent placed on chart 2/4.  CM notified that patient will require IV abx on discharge.  ID following   Patient has a form she would like you to sign can she drop it off and wait for it?  It is for Medicaid?

## 2024-02-05 PROBLEM — R29.6 UNWITNESSED FALL: Status: ACTIVE | Noted: 2023-10-09

## 2024-02-05 LAB
ANION GAP SERPL CALCULATED.3IONS-SCNC: 7 MMOL/L
BASOPHILS # BLD AUTO: 0.09 THOUSANDS/ÂΜL (ref 0–0.1)
BASOPHILS NFR BLD AUTO: 1 % (ref 0–1)
BUN SERPL-MCNC: 15 MG/DL (ref 5–25)
CALCIUM SERPL-MCNC: 9.3 MG/DL (ref 8.4–10.2)
CHLORIDE SERPL-SCNC: 97 MMOL/L (ref 96–108)
CO2 SERPL-SCNC: 31 MMOL/L (ref 21–32)
CREAT SERPL-MCNC: 0.89 MG/DL (ref 0.6–1.3)
EOSINOPHIL # BLD AUTO: 0.34 THOUSAND/ÂΜL (ref 0–0.61)
EOSINOPHIL NFR BLD AUTO: 4 % (ref 0–6)
ERYTHROCYTE [DISTWIDTH] IN BLOOD BY AUTOMATED COUNT: 16.5 % (ref 11.6–15.1)
GFR SERPL CREATININE-BSD FRML MDRD: 89 ML/MIN/1.73SQ M
GLUCOSE SERPL-MCNC: 111 MG/DL (ref 65–140)
GLUCOSE SERPL-MCNC: 147 MG/DL (ref 65–140)
GLUCOSE SERPL-MCNC: 159 MG/DL (ref 65–140)
GLUCOSE SERPL-MCNC: 185 MG/DL (ref 65–140)
GLUCOSE SERPL-MCNC: 206 MG/DL (ref 65–140)
HCT VFR BLD AUTO: 35.6 % (ref 36.5–49.3)
HGB BLD-MCNC: 10.6 G/DL (ref 12–17)
IMM GRANULOCYTES # BLD AUTO: 0.04 THOUSAND/UL (ref 0–0.2)
IMM GRANULOCYTES NFR BLD AUTO: 1 % (ref 0–2)
LYMPHOCYTES # BLD AUTO: 2.11 THOUSANDS/ÂΜL (ref 0.6–4.47)
LYMPHOCYTES NFR BLD AUTO: 26 % (ref 14–44)
MCH RBC QN AUTO: 25.1 PG (ref 26.8–34.3)
MCHC RBC AUTO-ENTMCNC: 29.8 G/DL (ref 31.4–37.4)
MCV RBC AUTO: 84 FL (ref 82–98)
MONOCYTES # BLD AUTO: 0.67 THOUSAND/ÂΜL (ref 0.17–1.22)
MONOCYTES NFR BLD AUTO: 8 % (ref 4–12)
NEUTROPHILS # BLD AUTO: 4.94 THOUSANDS/ÂΜL (ref 1.85–7.62)
NEUTS SEG NFR BLD AUTO: 60 % (ref 43–75)
NRBC BLD AUTO-RTO: 0 /100 WBCS
PLATELET # BLD AUTO: 348 THOUSANDS/UL (ref 149–390)
PMV BLD AUTO: 9.7 FL (ref 8.9–12.7)
POTASSIUM SERPL-SCNC: 4 MMOL/L (ref 3.5–5.3)
RBC # BLD AUTO: 4.22 MILLION/UL (ref 3.88–5.62)
SODIUM SERPL-SCNC: 135 MMOL/L (ref 135–147)
VANCOMYCIN SERPL-MCNC: 27.7 UG/ML (ref 10–20)
WBC # BLD AUTO: 8.19 THOUSAND/UL (ref 4.31–10.16)

## 2024-02-05 PROCEDURE — 82948 REAGENT STRIP/BLOOD GLUCOSE: CPT

## 2024-02-05 PROCEDURE — 80048 BASIC METABOLIC PNL TOTAL CA: CPT | Performed by: PHYSICIAN ASSISTANT

## 2024-02-05 PROCEDURE — 99232 SBSQ HOSP IP/OBS MODERATE 35: CPT | Performed by: PHYSICIAN ASSISTANT

## 2024-02-05 PROCEDURE — 80202 ASSAY OF VANCOMYCIN: CPT | Performed by: INTERNAL MEDICINE

## 2024-02-05 PROCEDURE — 85025 COMPLETE CBC W/AUTO DIFF WBC: CPT | Performed by: PHYSICIAN ASSISTANT

## 2024-02-05 RX ADMIN — METHOCARBAMOL TABLETS 750 MG: 500 TABLET, COATED ORAL at 22:09

## 2024-02-05 RX ADMIN — GABAPENTIN 400 MG: 400 CAPSULE ORAL at 18:05

## 2024-02-05 RX ADMIN — ACETAMINOPHEN 325MG 650 MG: 325 TABLET ORAL at 20:57

## 2024-02-05 RX ADMIN — METOPROLOL SUCCINATE 75 MG: 50 TABLET, EXTENDED RELEASE ORAL at 09:07

## 2024-02-05 RX ADMIN — OXYCODONE HYDROCHLORIDE 10 MG: 10 TABLET ORAL at 02:43

## 2024-02-05 RX ADMIN — TAMSULOSIN HYDROCHLORIDE 0.4 MG: 0.4 CAPSULE ORAL at 18:05

## 2024-02-05 RX ADMIN — OXYCODONE HYDROCHLORIDE 10 MG: 10 TABLET ORAL at 11:49

## 2024-02-05 RX ADMIN — FOLIC ACID 2000 MCG: 1 TABLET ORAL at 09:08

## 2024-02-05 RX ADMIN — LIDOCAINE 5% 1 PATCH: 700 PATCH TOPICAL at 09:06

## 2024-02-05 RX ADMIN — HYDROCHLOROTHIAZIDE 25 MG: 25 TABLET ORAL at 09:07

## 2024-02-05 RX ADMIN — SERTRALINE HYDROCHLORIDE 100 MG: 100 TABLET ORAL at 09:07

## 2024-02-05 RX ADMIN — ACETAMINOPHEN 325MG 650 MG: 325 TABLET ORAL at 02:43

## 2024-02-05 RX ADMIN — PRAVASTATIN SODIUM 20 MG: 20 TABLET ORAL at 18:05

## 2024-02-05 RX ADMIN — INSULIN LISPRO 1 UNITS: 100 INJECTION, SOLUTION INTRAVENOUS; SUBCUTANEOUS at 22:11

## 2024-02-05 RX ADMIN — MORPHINE SULFATE 4 MG: 4 INJECTION, SOLUTION INTRAMUSCULAR; INTRAVENOUS at 00:23

## 2024-02-05 RX ADMIN — CEFEPIME HYDROCHLORIDE 2000 MG: 2 INJECTION, SOLUTION INTRAVENOUS at 03:54

## 2024-02-05 RX ADMIN — FINASTERIDE 5 MG: 5 TABLET, FILM COATED ORAL at 09:07

## 2024-02-05 RX ADMIN — INSULIN LISPRO 1 UNITS: 100 INJECTION, SOLUTION INTRAVENOUS; SUBCUTANEOUS at 18:04

## 2024-02-05 RX ADMIN — CLONAZEPAM 1 MG: 1 TABLET ORAL at 09:07

## 2024-02-05 RX ADMIN — Medication 3 MG: at 22:09

## 2024-02-05 RX ADMIN — VANCOMYCIN HYDROCHLORIDE 1000 MG: 1 INJECTION, SOLUTION INTRAVENOUS at 02:42

## 2024-02-05 RX ADMIN — MIRTAZAPINE 45 MG: 15 TABLET, FILM COATED ORAL at 22:09

## 2024-02-05 RX ADMIN — CEFEPIME HYDROCHLORIDE 2000 MG: 2 INJECTION, SOLUTION INTRAVENOUS at 18:04

## 2024-02-05 RX ADMIN — GABAPENTIN 400 MG: 400 CAPSULE ORAL at 09:07

## 2024-02-05 RX ADMIN — LOSARTAN POTASSIUM 100 MG: 50 TABLET, FILM COATED ORAL at 09:07

## 2024-02-05 RX ADMIN — CLONAZEPAM 0.5 MG: 0.5 TABLET ORAL at 22:10

## 2024-02-05 RX ADMIN — NIFEDIPINE 120 MG: 30 TABLET, EXTENDED RELEASE ORAL at 09:07

## 2024-02-05 RX ADMIN — HEPARIN SODIUM 5000 UNITS: 5000 INJECTION INTRAVENOUS; SUBCUTANEOUS at 22:10

## 2024-02-05 RX ADMIN — CLONAZEPAM 1 MG: 1 TABLET ORAL at 18:05

## 2024-02-05 RX ADMIN — OXYCODONE HYDROCHLORIDE 10 MG: 10 TABLET ORAL at 20:57

## 2024-02-05 RX ADMIN — HEPARIN SODIUM 5000 UNITS: 5000 INJECTION INTRAVENOUS; SUBCUTANEOUS at 05:25

## 2024-02-05 RX ADMIN — INSULIN LISPRO 2 UNITS: 100 INJECTION, SOLUTION INTRAVENOUS; SUBCUTANEOUS at 09:06

## 2024-02-05 RX ADMIN — ASPIRIN 81 MG: 81 TABLET, COATED ORAL at 09:07

## 2024-02-05 NOTE — ASSESSMENT & PLAN NOTE
Patient reports he had an unwitnessed fall around 0600 this morning  Describes he was sitting on the edge of the bed; went to stand and his left foot began to slide.  He lost his balance and grabbed bed railing with his left hand and slowly lowered himself to the ground on to his right hip.  Reports left hip pain but believes this is stable and chronic for him  On exam no evidence of trauma.  No ecchymosis, swelling, erythema.  Alert and oriented x4  Denies head strike.  Was able to get himself up off the floor.  Denies any dizziness/lightheadedness, chest pain, shortness of breath that led up to the fall.  Denies any extremity pain.  Is able to ambulate with his walker in the room without difficulty  Discussed possible imaging given report of hip pain but pt is refusing any and all imaging at this time.  Discussed notifying nursing with any changes and patient verbalized understanding

## 2024-02-05 NOTE — PROGRESS NOTES
[facility]  Progress Note  Name: Juan San I  MRN: [id number]  Unit/Bed#: -01 I Date of Admission: 1/24/2024   Date of Service: 2/5/2024 I Hospital Day: 12    Assessment/Plan   * Discitis of lumbosacral region  Assessment & Plan  MRI consistent with discitis/osteomyelitis of L1-2, L4-5, L5-S1 regions.  Neurosurgery consulted  Per ER discussion with neurosurgery, obtain blood cultures and hold off on antibiotics pending results unless patient clinically declines  WBC WNL, afebrile  Consider transfer if patient becomes unstable/develops red flag symptoms however exam remains stable  Prior provider discussed with ID - recommend IR consult   Performed 1/30 for path  Performed again 2/1 with culture - no growth  Blood cultures negative x2 negative to date  Discussed with ID and neurosurgery 2/3 - given elevated ESR/CRP and imaging findings, will empirically treat for OM/discitis.  Started on IV cefepime/vancomycin.  Will need PICC line placed - consent placed on chart 2/4.    Patient medically stable for discharge pending PICC placement and arrangement of IV abx     Hypertension  Assessment & Plan  Home regimen includes losartan/HCTZ, metoprolol, nifedipine  Blood pressure stable    Unwitnessed fall  Assessment & Plan  Patient reports he had an unwitnessed fall around 0600 this morning  Describes he was sitting on the edge of the bed; went to stand and his left foot began to slide.  He lost his balance and grabbed bed railing with his left hand and slowly lowered himself to the ground on to his right hip.  Reports left hip pain but believes this is stable and chronic for him  On exam no evidence of trauma.  No ecchymosis, swelling, erythema.  Alert and oriented x4  Denies head strike.  Was able to get himself up off the floor.  Denies any dizziness/lightheadedness, chest pain, shortness of breath that led up to the fall.  Denies any extremity pain.  Is able to ambulate with his  walker in the room without difficulty  Discussed possible imaging given report of hip pain but pt is refusing any and all imaging at this time.  Discussed notifying nursing with any changes and patient verbalized understanding    Benign prostatic hyperplasia with urinary retention  Assessment & Plan  Continue finasteride and tamsulosin  Urinary retention protocol    Thyroid cancer (HCC)  Assessment & Plan  S/P partial thyroidectomy  Not maintained on levothyroxine - levels have been stable  Follow[facility] - in remission    Herniated nucleus pulposus of lumbosacral region  Assessment & Plan  S/P surgical intervention April 2023    DM (diabetes mellitus) (HCC)  Assessment & Plan  Lab Results   Component Value Date    HGBA1C 8.2 (H) 01/24/2024       Recent Labs     02/04/24  1131 02/04/24  1638 02/04/24  2111 02/05/24  0751   POCGLU 153* 146* 201* 206*         Blood Sugar Average: Last 72 hrs:  (P) 167.2588272719311381  Maintained on metformin and jardiance outpatient.   Hold while admitted, can resume on discharge  SSI with meals while admitted  Current Accu-Cheks and hypoglycemia protocol    Cirrhosis, alcoholic (HCC)  Assessment & Plan  Continue outpatient follow-up  Follows with PCP         VTE Pharmacologic Prophylaxis: VTE Score: 3 Moderate Risk (Score 3-4) - Pharmacological DVT Prophylaxis Ordered: heparin.    Mobility:   Basic Mobility Inpatient Raw Score: 24  JH-HLM Goal: 8: Walk 250 feet or more  JH-HLM Achieved: 8: Walk 250 feet ot more  HLM Goal achieved. Continue to encourage appropriate mobility.    Patient Centered Rounds: I performed bedside rounds with nursing staff today.   Discussions with Specialists or Other Care Team Provider: Discussed with IR - they will attempt to place PICC today but depends on schedule.  Unable to confirm at this time whether can be placed today.  Will discuss with CM and ID.    Education and Discussions with Family / Patient: Patient declined call to .      Total Time Spent on Date of Encounter in care of patient: 40 mins. This time was spent on one or more of the following: performing physical exam; counseling and coordination of care; obtaining or reviewing history; documenting in the medical record; reviewing/ordering tests, medications or procedures; communicating with other healthcare professionals and discussing with patient's family/caregivers.    Current Length of Stay: 12 day(s)  Current Patient Status: Inpatient   Certification Statement: The patient will continue to require additional inpatient hospital stay due to PICC line placement, IV abx arrangements for discharge  Discharge Plan: Anticipate discharge later today or tomorrow to home with home services.    Code Status: Level 1 - Full Code    Subjective:   Patient states he feels well.  Explains he did have an unwitnessed fall earlier this morning.  States that he was sitting on the edge of the bed with his feet on the floor.  When he went to stand his left foot began to slide and he grabbed the bed railing with his left hand.  He then lost his balance and slowly lowered himself to the floor onto his buttocks/right hip.  Denies head strike.  Was able to get up on his own and has been ambulating in his room since with his walker without difficulty.  States that his back and hip pain are chronic and without any changes.  Denied any symptoms leading up to the fall including chest pain/palpitations, lightheadedness or dizziness, shortness of breath, numbness or tingling.  Denies lower extremity weakness, saddle anesthesia, bladder or bowel incontinence.  Declining additional imaging at this time.    Objective:     Vitals:   Temp (24hrs), Av.8 °F (36.6 °C), Min:97.5 °F (36.4 °C), Max:98.1 °F (36.7 °C)    Temp:  [97.5 °F (36.4 °C)-98.1 °F (36.7 °C)] 97.5 °F (36.4 °C)  HR:  [65-71] 68  Resp:  [15-16] 15  BP: (103-141)/(67-83) 141/77  SpO2:  [98 %-100 %] 99 %  Body mass index is 26.58 kg/m².     Input and  Output Summary (last 24 hours):     Intake/Output Summary (Last 24 hours) at 2/5/2024 0819  Last data filed at 2/4/2024 1714  Gross per 24 hour   Intake 740 ml   Output --   Net 740 ml       Physical Exam:   Physical Exam  Vitals and nursing note reviewed.   Constitutional:       General: He is not in acute distress.     Appearance: He is well-developed.      Comments: No acute distress   HENT:      Head: Normocephalic and atraumatic.   Eyes:      General: No scleral icterus.     Extraocular Movements: Extraocular movements intact.      Conjunctiva/sclera: Conjunctivae normal.   Cardiovascular:      Rate and Rhythm: Normal rate and regular rhythm.      Heart sounds: No murmur heard.  Pulmonary:      Effort: Pulmonary effort is normal. No respiratory distress.      Breath sounds: Normal breath sounds. No wheezing, rhonchi or rales.   Abdominal:      General: Bowel sounds are normal.      Palpations: Abdomen is soft.      Tenderness: There is no abdominal tenderness. There is no guarding or rebound.   Musculoskeletal:         General: No swelling.      Cervical back: Normal range of motion.      Comments: Ambulating around room with walker without difficulty.  No extremity edema.  Bilateral hips without ecchymosis or abrasions, nontender to palpation, no evidence of trauma.   Skin:     General: Skin is warm and dry.      Capillary Refill: Capillary refill takes less than 2 seconds.      Findings: No abrasion or ecchymosis.   Neurological:      General: No focal deficit present.      Mental Status: He is alert and oriented to person, place, and time.   Psychiatric:         Mood and Affect: Mood normal.         Speech: Speech normal.         Behavior: Behavior normal.          Additional Data:     Labs:  Results from last 7 days   Lab Units 02/05/24  0331   WBC Thousand/uL 8.19   HEMOGLOBIN g/dL 10.6*   HEMATOCRIT % 35.6*   PLATELETS Thousands/uL 348   NEUTROS PCT % 60   LYMPHS PCT % 26   MONOS PCT % 8   EOS PCT % 4      Results from last 7 days   Lab Units 02/05/24  0331   SODIUM mmol/L 135   POTASSIUM mmol/L 4.0   CHLORIDE mmol/L 97   CO2 mmol/L 31   BUN mg/dL 15   CREATININE mg/dL 0.89   ANION GAP mmol/L 7   CALCIUM mg/dL 9.3   GLUCOSE RANDOM mg/dL 147*         Results from last 7 days   Lab Units 02/05/24  0751 02/04/24  2111 02/04/24  1638 02/04/24  1131 02/04/24  0728 02/03/24  2056 02/03/24  1659 02/03/24  1115 02/03/24  0825 02/02/24  2138 02/02/24  1602 02/02/24  1045   POC GLUCOSE mg/dl 206* 201* 146* 153* 194* 201* 133 207* 121 151* 122 169*               Lines/Drains:  Invasive Devices       Peripheral Intravenous Line  Duration             Peripheral IV 02/04/24 Right Antecubital <1 day                          Imaging: No pertinent imaging reviewed.    Recent Cultures (last 7 days):   Results from last 7 days   Lab Units 02/01/24  1455   GRAM STAIN RESULT  No Polys  No organisms seen   WOUND CULTURE  No growth       Last 24 Hours Medication List:   Current Facility-Administered Medications   Medication Dose Route Frequency Provider Last Rate    acetaminophen  650 mg Oral Q6H PRN Sveta Smith PA-C      aluminum-magnesium hydroxide-simethicone  30 mL Oral Q6H PRN Sveta Smith PA-C      aspirin  81 mg Oral Daily Sintia Soto PA-C      cefepime  2,000 mg Intravenous Q12H Antwon Shetty MD 2,000 mg (02/05/24 0354)    clonazePAM  0.5 mg Oral Daily PRN Tiffany Cobb MD      clonazePAM  1 mg Oral BID Tiffany Cobb MD      docusate sodium  100 mg Oral BID Sailaja Salcedo PA-C      fentaNYL  25 mcg Intravenous Q5 Min PRN Portia Henderson MD      finasteride  5 mg Oral Daily Sveta Smith PA-C      fluticasone  1 spray Nasal Daily PRN Sveta Smith PA-C      folic acid  2,000 mcg Oral Daily Sveta Smith PA-C      gabapentin  400 mg Oral BID Sveta Smith PA-C      heparin (porcine)  5,000 Units Subcutaneous Q8H [facility] Sveta Peabody, PA-C      losartan  100 mg Oral Daily Adelita Marina DO      And    hydroCHLOROthiazide  25 mg Oral Daily  Adelita Marina,       insulin lispro  1-6 Units Subcutaneous TID AC Sveta Smith, DANIEL      insulin lispro  1-6 Units Subcutaneous HS Sveta Smith, DANIEL      lidocaine  1 patch Topical Daily Sveta Smith, DANIEL      melatonin  3 mg Oral HS Sveta Smith, DANIEL      methocarbamol  750 mg Oral Q8H PRN Sveta Smith, DANIEL      metoprolol succinate  75 mg Oral QAM Sveta Smith, DANIEL      mirtazapine  45 mg Oral HS Sveta Smith, DANIEL      morphine injection  4 mg Intravenous Q4H PRN Sveta Smith, DANIEL      NIFEdipine  120 mg Oral Daily Adelita Marina DO      ondansetron  4 mg Intravenous Q6H PRN Sveta Smith, DANIEL      oxyCODONE  10 mg Oral Q4H PRN Sveta Smith, DANIEL      oxyCODONE  5 mg Oral Q4H PRN Sveta Smith, DANIEL      polyethylene glycol  17 g Oral Daily Sailaja Salcedo PA-C      pravastatin  20 mg Oral Daily With Dinner Sveta Smith PA-C      senna  1 tablet Oral BID Sintia Soto PA-C      sertraline  100 mg Oral Daily Adelita Marina DO      tamsulosin  0.4 mg Oral Daily With Dinner Sveta Smith PA-C      [START ON 2/6/2024] vancomycin  1,500 mg Intravenous Q24H Antwon Shetty MD          Today, Patient Was Seen By: Sintia Soto PA-C    **Please Note: This note may have been constructed using a voice recognition system.**

## 2024-02-05 NOTE — ASSESSMENT & PLAN NOTE
Lab Results   Component Value Date    HGBA1C 8.2 (H) 01/24/2024       Recent Labs     02/04/24  1131 02/04/24  1638 02/04/24  2111 02/05/24  0751   POCGLU 153* 146* 201* 206*         Blood Sugar Average: Last 72 hrs:  (P) 167.3190497136717906  Maintained on metformin and jardiance outpatient.   Hold while admitted, can resume on discharge  SSI with meals while admitted  Current Accu-Cheks and hypoglycemia protocol

## 2024-02-05 NOTE — ASSESSMENT & PLAN NOTE
S/P partial thyroidectomy  Not maintained on levothyroxine - levels have been stable  Follows with [facility] - in remission

## 2024-02-05 NOTE — PROGRESS NOTES
Progress Note - Infectious Disease   Juan San 68 y.o. male MRN: [id number]  Unit/Bed#: -01 Encounter: [id number]    Assessment:  68-year-old man with epidural phlegmon, osteomyelitis/discitis, leukocytosis, type 2 diabetes, relatively recent spinal surgeries     Plan:  1) Back infection, leukocytosis - Pt. with findings consistent with infection in lumbar and sacral spine. Unfortunately, culture thus far without growth.  Will request the microbiology hold culture for total of 7 days, but regardless given symptoms, lab findings, imaging findings, will treat empirically for osteomyelitis/discitis.  Fortunately, patient tolerating broad-spectrum antibiotics.  Will continue same for approximately 42 days, the patient will need follow-up imaging prior to discontinuation of antibiotics to ensure resolution of collections noted on admission imaging.     ===> Will CONTINUE VANCOMYCIN, CEFEPIME, and follow patient for toxicity as well as clinical improvement whilst on these agents.    ===> Anticipate total of 42 days of antibiotics (I.E. UNTIL 8 MARCH 2024). Prescription for outpatient antibiotic therapy as well as concomitant laboratory monitoring of same placed in patient's paper chart at the nurses station.    ===> In addition, patient will need follow-up MRI in about 5 weeks (can be arranged with neurosurgery or through my office), to assess for resolution of collections noted on admission imaging as discussed above.        2) T2DM - Optimize BG control in the setting of active infection.      ===> Discussed w/ 1' team   ===> Will sign off, but please do not hesitate to contact us with further questions, or if a change in the patient's clinical status warrants.    ===> Patient does NOT need to schedule f/u with ID on d/c UNLESS a change in clinical status or a recrudescence of symptoms warrants, but was given our contact information should any issues with prescribed post-d/c treatment arise.       Subjective/Objective   Overnight, no acute events.  Patient without fever, chills, nausea, vomiting.  Spinal cultures remain without growth.  Tolerating cefepime, vancomycin well.  Awaiting placement of PICC line, OPAT arrangements.    Temp:  [97.5 °F (36.4 °C)-98.1 °F (36.7 °C)] 97.5 °F (36.4 °C)  HR:  [65-71] 68  Resp:  [15-16] 15  BP: (103-141)/(67-83) 141/77  SpO2:  [98 %-100 %] 99 %  Temp (24hrs), Av.8 °F (36.6 °C), Min:97.5 °F (36.4 °C), Max:98.1 °F (36.7 °C)  Current: Temperature: 97.5 °F (36.4 °C)    Physical Exam:   Gen: AA, NAD, conversant, VS reviewed  ENT: MMM  CV: No m/r/g, S1, S2  Pulm: Lungs CTAB, no respiratory distress  ABD: S/NT/ND  Skin: No rash on exposed skin  Psych: Oriented, nl. affect     Invasive Devices       Peripheral Intravenous Line  Duration             Peripheral IV 24 Right Antecubital <1 day                    Lab, Imaging and other studies: I have personally reviewed pertinent reports.

## 2024-02-05 NOTE — PLAN OF CARE
Problem: PAIN - ADULT  Goal: Verbalizes/displays adequate comfort level or baseline comfort level  Description: Interventions:  - Encourage patient to monitor pain and request assistance  - Assess pain using appropriate pain scale  - Administer analgesics based on type and severity of pain and evaluate response  - Implement non-pharmacological measures as appropriate and evaluate response  - Consider cultural and social influences on pain and pain management  - Notify physician/advanced practitioner if interventions unsuccessful or patient reports new pain  Outcome: Progressing     Problem: INFECTION - ADULT  Goal: Absence or prevention of progression during hospitalization  Description: INTERVENTIONS:  - Assess and monitor for signs and symptoms of infection  - Monitor lab/diagnostic results  - Monitor all insertion sites, i.e. indwelling lines, tubes, and drains  - Monitor endotracheal if appropriate and nasal secretions for changes in amount and color  - [place] appropriate cooling/warming therapies per order  - Administer medications as ordered  - Instruct and encourage patient and family to use good hand hygiene technique  - Identify and instruct in appropriate isolation precautions for identified infection/condition  Outcome: Progressing  Goal: Absence of fever/infection during neutropenic period  Description: INTERVENTIONS:  - Monitor WBC    Outcome: Progressing     Problem: SAFETY ADULT  Goal: Maintain or return to baseline ADL function  Description: INTERVENTIONS:  -  Assess patient's ability to carry out ADLs; assess patient's baseline for ADL function and identify physical deficits which impact ability to perform ADLs (bathing, care of mouth/teeth, toileting, grooming, dressing, etc.)  - Assess/evaluate cause of self-care deficits   - Assess range of motion  - Assess patient's mobility; develop plan if impaired  - Assess patient's need for assistive devices and provide as appropriate  - Encourage  maximum independence but intervene and supervise when necessary  - Involve family in performance of ADLs  - Assess for home care needs following discharge   - Consider OT consult to assist with ADL evaluation and planning for discharge  - Provide patient education as appropriate  Outcome: Progressing     Problem: DISCHARGE PLANNING  Goal: Discharge to home or other facility with appropriate resources  Description: INTERVENTIONS:  - Identify barriers to discharge w/patient and caregiver  - Arrange for needed discharge resources and transportation as appropriate  - Identify discharge learning needs (meds, wound care, etc.)  - Arrange for interpretive services to assist at discharge as needed  - Refer to Case Management Department for coordinating discharge planning if the patient needs post-hospital services based on physician/advanced practitioner order or complex needs related to functional status, cognitive ability, or social support system  Outcome: Progressing     Problem: Knowledge Deficit  Goal: Patient/family/caregiver demonstrates understanding of disease process, treatment plan, medications, and discharge instructions  Description: Complete learning assessment and assess knowledge base.  Interventions:  - Provide teaching at level of understanding  - Provide teaching via preferred learning methods  Outcome: Progressing     Problem: MUSCULOSKELETAL - ADULT  Goal: Maintain or return mobility to safest level of function  Description: INTERVENTIONS:  - Assess patient's ability to carry out ADLs; assess patient's baseline for ADL function and identify physical deficits which impact ability to perform ADLs (bathing, care of mouth/teeth, toileting, grooming, dressing, etc.)  - Assess/evaluate cause of self-care deficits   - Assess range of motion  - Assess patient's mobility  - Assess patient's need for assistive devices and provide as appropriate  - Encourage maximum independence but intervene and supervise when  necessary  - Involve family in performance of ADLs  - Assess for home care needs following discharge   - Consider OT consult to assist with ADL evaluation and planning for discharge  - Provide patient education as appropriate  Outcome: Progressing  Goal: Maintain proper alignment of affected body part  Description: INTERVENTIONS:  - Support, maintain and protect limb and body alignment  - Provide patient/ family with appropriate education  Outcome: Progressing     Problem: Prexisting or High Potential for Compromised Skin Integrity  Goal: Skin integrity is maintained or improved  Description: INTERVENTIONS:  - Identify patients at risk for skin breakdown  - Assess and monitor skin integrity  - Assess and monitor nutrition and hydration status  - Monitor labs   - Assess for incontinence   - Turn and reposition patient  - Assist with mobility/ambulation  - Relieve pressure over bony prominences  - Avoid friction and shearing  - Provide appropriate hygiene as needed including keeping skin clean and dry  - Evaluate need for skin moisturizer/barrier cream  - Collaborate with interdisciplinary team   - Patient/family teaching  - Consider wound care consult   Outcome: Progressing

## 2024-02-05 NOTE — PLAN OF CARE
Problem: Potential for Falls  Goal: Patient will remain free of falls  Description: INTERVENTIONS:  - Educate patient/family on patient safety including physical limitations  - Instruct patient to call for assistance with activity   - Consult OT/PT to assist with strengthening/mobility   - Keep Call bell within reach  - Keep bed low and locked with side rails adjusted as appropriate  - Keep care items and personal belongings within reach  - Initiate and maintain comfort rounds  - Make Fall Risk Sign visible to staff  - Offer Toileting every 2 Hours, in advance of need  - Initiate/Maintain call bell alarm  - Obtain necessary fall risk management equipment: call bell and bed alarm  - Apply yellow socks and bracelet for high fall risk patients  - Consider moving patient to room near nurses station  2/5/2024 0740 by Osmar Diaz LPN  Outcome: Progressing  2/5/2024 0740 by Osmar Diaz LPN  Outcome: Progressing     Problem: PAIN - ADULT  Goal: Verbalizes/displays adequate comfort level or baseline comfort level  Description: Interventions:  - Encourage patient to monitor pain and request assistance  - Assess pain using appropriate pain scale  - Administer analgesics based on type and severity of pain and evaluate response  - Implement non-pharmacological measures as appropriate and evaluate response  - Consider cultural and social influences on pain and pain management  - Notify physician/advanced practitioner if interventions unsuccessful or patient reports new pain  2/5/2024 0740 by Osmar Diaz LPN  Outcome: Progressing  2/5/2024 0740 by Osmar Diaz LPN  Outcome: Progressing     Problem: INFECTION - ADULT  Goal: Absence or prevention of progression during hospitalization  Description: INTERVENTIONS:  - Assess and monitor for signs and symptoms of infection  - Monitor lab/diagnostic results  - Monitor all insertion sites, i.e. indwelling lines, tubes, and drains  - Monitor endotracheal if appropriate and nasal secretions  for changes in amount and color  - [place] appropriate cooling/warming therapies per order  - Administer medications as ordered  - Instruct and encourage patient and family to use good hand hygiene technique  - Identify and instruct in appropriate isolation precautions for identified infection/condition  2/5/2024 0740 by Osmar Diaz LPN  Outcome: Progressing  2/5/2024 0740 by Osmar Diaz LPN  Outcome: Progressing  Goal: Absence of fever/infection during neutropenic period  Description: INTERVENTIONS:  - Monitor WBC    2/5/2024 0740 by Osmar Diaz LPN  Outcome: Progressing  2/5/2024 0740 by Osmar Diaz LPN  Outcome: Progressing

## 2024-02-05 NOTE — PROGRESS NOTES
Patient:  WINSOME LUGO    MRN:  [id number]    Aidin Request ID:  [id number]    Level of care reserved:  Infusion    Partner Reserved:  [facility] - [place]BDIR[place], [place] [address] [phone]    Clinical needs requested:    Geography searched:  [id number]    Start of Service:    Request sent:  8:38am EST on 2/5/2024 by Lona Bloom    Partner reserved:  3:36pm EST on 2/5/2024 by Lona Bloom    Choice list shared:

## 2024-02-05 NOTE — PROGRESS NOTES
Juan San is a 68 y.o. male who is currently ordered Vancomycin IV with management by the Pharmacy Consult service.  Relevant clinical data and objective / subjective history reviewed.  Vancomycin Assessment:  Indication and Goal AUC/Trough: Bone/joint infection (goal -600, trough >10)  Clinical Status: stable  Micro:     Renal Function  Scr:0.89  CrCl: 81.6 mL/min  Renal replacement: Not on dialysis  Days of Therapy: 3  Current Dose: 1000 mg Q12H  Vancomycin Plan:  New Dosin mg Q24H  Estimated AUC: 444 mcg*hr/mL  Estimated Trough: 11.3 mcg/mL  Next Level: random trough on  with AM labs  Renal Function Monitoring: Daily BMP and UOP  Pharmacy will continue to follow closely for s/sx of nephrotoxicity, infusion reactions and appropriateness of therapy.  BMP and CBC will be ordered per protocol. We will continue to follow the patient’s culture results and clinical progress daily.    Anne May, Pharmacist

## 2024-02-06 ENCOUNTER — HOME HEALTH ADMISSION (OUTPATIENT)
Dept: HOME HEALTH SERVICES | Facility: HOME HEALTHCARE | Age: 69
End: 2024-02-06
Payer: COMMERCIAL

## 2024-02-06 ENCOUNTER — APPOINTMENT (INPATIENT)
Dept: INTERVENTIONAL RADIOLOGY/VASCULAR | Facility: HOSPITAL | Age: 69
DRG: 552 | End: 2024-02-06
Payer: COMMERCIAL

## 2024-02-06 VITALS
WEIGHT: 180 LBS | RESPIRATION RATE: 14 BRPM | OXYGEN SATURATION: 95 % | SYSTOLIC BLOOD PRESSURE: 161 MMHG | HEIGHT: 69 IN | HEART RATE: 92 BPM | BODY MASS INDEX: 26.66 KG/M2 | DIASTOLIC BLOOD PRESSURE: 93 MMHG | TEMPERATURE: 98.2 F

## 2024-02-06 PROBLEM — R29.6 UNWITNESSED FALL: Status: RESOLVED | Noted: 2023-10-09 | Resolved: 2024-02-06

## 2024-02-06 LAB
ANION GAP SERPL CALCULATED.3IONS-SCNC: 7 MMOL/L
BASOPHILS # BLD AUTO: 0.07 THOUSANDS/ÂΜL (ref 0–0.1)
BASOPHILS NFR BLD AUTO: 1 % (ref 0–1)
BUN SERPL-MCNC: 16 MG/DL (ref 5–25)
CALCIUM SERPL-MCNC: 9.5 MG/DL (ref 8.4–10.2)
CHLORIDE SERPL-SCNC: 97 MMOL/L (ref 96–108)
CO2 SERPL-SCNC: 29 MMOL/L (ref 21–32)
CREAT SERPL-MCNC: 0.77 MG/DL (ref 0.6–1.3)
EOSINOPHIL # BLD AUTO: 0.37 THOUSAND/ÂΜL (ref 0–0.61)
EOSINOPHIL NFR BLD AUTO: 5 % (ref 0–6)
ERYTHROCYTE [DISTWIDTH] IN BLOOD BY AUTOMATED COUNT: 16.6 % (ref 11.6–15.1)
GFR SERPL CREATININE-BSD FRML MDRD: 94 ML/MIN/1.73SQ M
GLUCOSE SERPL-MCNC: 130 MG/DL (ref 65–140)
GLUCOSE SERPL-MCNC: 145 MG/DL (ref 65–140)
GLUCOSE SERPL-MCNC: 156 MG/DL (ref 65–140)
HCT VFR BLD AUTO: 35.1 % (ref 36.5–49.3)
HGB BLD-MCNC: 10.6 G/DL (ref 12–17)
IMM GRANULOCYTES # BLD AUTO: 0.03 THOUSAND/UL (ref 0–0.2)
IMM GRANULOCYTES NFR BLD AUTO: 0 % (ref 0–2)
LYMPHOCYTES # BLD AUTO: 2.02 THOUSANDS/ÂΜL (ref 0.6–4.47)
LYMPHOCYTES NFR BLD AUTO: 27 % (ref 14–44)
MCH RBC QN AUTO: 25.3 PG (ref 26.8–34.3)
MCHC RBC AUTO-ENTMCNC: 30.2 G/DL (ref 31.4–37.4)
MCV RBC AUTO: 84 FL (ref 82–98)
MONOCYTES # BLD AUTO: 0.56 THOUSAND/ÂΜL (ref 0.17–1.22)
MONOCYTES NFR BLD AUTO: 8 % (ref 4–12)
NEUTROPHILS # BLD AUTO: 4.33 THOUSANDS/ÂΜL (ref 1.85–7.62)
NEUTS SEG NFR BLD AUTO: 59 % (ref 43–75)
NRBC BLD AUTO-RTO: 0 /100 WBCS
PLATELET # BLD AUTO: 358 THOUSANDS/UL (ref 149–390)
PMV BLD AUTO: 10.3 FL (ref 8.9–12.7)
POTASSIUM SERPL-SCNC: 4.1 MMOL/L (ref 3.5–5.3)
RBC # BLD AUTO: 4.19 MILLION/UL (ref 3.88–5.62)
SODIUM SERPL-SCNC: 133 MMOL/L (ref 135–147)
WBC # BLD AUTO: 7.38 THOUSAND/UL (ref 4.31–10.16)

## 2024-02-06 PROCEDURE — 80048 BASIC METABOLIC PNL TOTAL CA: CPT | Performed by: PHYSICIAN ASSISTANT

## 2024-02-06 PROCEDURE — 82948 REAGENT STRIP/BLOOD GLUCOSE: CPT

## 2024-02-06 PROCEDURE — 85025 COMPLETE CBC W/AUTO DIFF WBC: CPT | Performed by: PHYSICIAN ASSISTANT

## 2024-02-06 PROCEDURE — 99239 HOSP IP/OBS DSCHRG MGMT >30: CPT | Performed by: PHYSICIAN ASSISTANT

## 2024-02-06 PROCEDURE — C1751 CATH, INF, PER/CENT/MIDLINE: HCPCS

## 2024-02-06 PROCEDURE — 76937 US GUIDE VASCULAR ACCESS: CPT

## 2024-02-06 RX ORDER — SENNOSIDES 8.6 MG
8.6 TABLET ORAL 2 TIMES DAILY
Qty: 60 TABLET | Refills: 0 | Status: SHIPPED | OUTPATIENT
Start: 2024-02-06

## 2024-02-06 RX ORDER — OXYCODONE HYDROCHLORIDE 10 MG/1
10 TABLET ORAL EVERY 6 HOURS PRN
Qty: 15 TABLET | Refills: 0 | Status: SHIPPED | OUTPATIENT
Start: 2024-02-06 | End: 2024-02-16

## 2024-02-06 RX ORDER — VANCOMYCIN HYDROCHLORIDE 1 G/200ML
1000 INJECTION, SOLUTION INTRAVENOUS EVERY 12 HOURS
Status: DISCONTINUED | OUTPATIENT
Start: 2024-02-06 | End: 2024-02-06 | Stop reason: HOSPADM

## 2024-02-06 RX ADMIN — INSULIN LISPRO 1 UNITS: 100 INJECTION, SOLUTION INTRAVENOUS; SUBCUTANEOUS at 08:30

## 2024-02-06 RX ADMIN — PRAVASTATIN SODIUM 20 MG: 20 TABLET ORAL at 15:07

## 2024-02-06 RX ADMIN — VANCOMYCIN HYDROCHLORIDE 1500 MG: 1 INJECTION, POWDER, LYOPHILIZED, FOR SOLUTION INTRAVENOUS at 00:50

## 2024-02-06 RX ADMIN — SERTRALINE HYDROCHLORIDE 100 MG: 100 TABLET ORAL at 08:32

## 2024-02-06 RX ADMIN — HEPARIN SODIUM 5000 UNITS: 5000 INJECTION INTRAVENOUS; SUBCUTANEOUS at 05:30

## 2024-02-06 RX ADMIN — HYDROCHLOROTHIAZIDE 25 MG: 25 TABLET ORAL at 08:32

## 2024-02-06 RX ADMIN — CLONAZEPAM 1 MG: 1 TABLET ORAL at 08:32

## 2024-02-06 RX ADMIN — OXYCODONE HYDROCHLORIDE 10 MG: 10 TABLET ORAL at 03:35

## 2024-02-06 RX ADMIN — FINASTERIDE 5 MG: 5 TABLET, FILM COATED ORAL at 08:32

## 2024-02-06 RX ADMIN — LOSARTAN POTASSIUM 100 MG: 50 TABLET, FILM COATED ORAL at 08:32

## 2024-02-06 RX ADMIN — FOLIC ACID 2000 MCG: 1 TABLET ORAL at 08:32

## 2024-02-06 RX ADMIN — ASPIRIN 81 MG: 81 TABLET, COATED ORAL at 08:32

## 2024-02-06 RX ADMIN — OXYCODONE HYDROCHLORIDE 10 MG: 10 TABLET ORAL at 09:43

## 2024-02-06 RX ADMIN — CEFEPIME HYDROCHLORIDE 2000 MG: 2 INJECTION, SOLUTION INTRAVENOUS at 03:35

## 2024-02-06 RX ADMIN — METOPROLOL SUCCINATE 75 MG: 50 TABLET, EXTENDED RELEASE ORAL at 08:31

## 2024-02-06 RX ADMIN — NIFEDIPINE 120 MG: 30 TABLET, EXTENDED RELEASE ORAL at 08:32

## 2024-02-06 RX ADMIN — GABAPENTIN 400 MG: 400 CAPSULE ORAL at 08:31

## 2024-02-06 RX ADMIN — TAMSULOSIN HYDROCHLORIDE 0.4 MG: 0.4 CAPSULE ORAL at 15:08

## 2024-02-06 RX ADMIN — MORPHINE SULFATE 4 MG: 4 INJECTION, SOLUTION INTRAMUSCULAR; INTRAVENOUS at 12:43

## 2024-02-06 RX ADMIN — VANCOMYCIN HYDROCHLORIDE 1000 MG: 1 INJECTION, SOLUTION INTRAVENOUS at 13:35

## 2024-02-06 RX ADMIN — CEFEPIME HYDROCHLORIDE 2000 MG: 2 INJECTION, SOLUTION INTRAVENOUS at 15:04

## 2024-02-06 RX ADMIN — ACETAMINOPHEN 325MG 650 MG: 325 TABLET ORAL at 03:35

## 2024-02-06 NOTE — DISCHARGE SUMMARY
[facility]  Discharge- Juan San 1955, 68 y.o. male MRN: [id number]  Unit/Bed#: [facility] Encounter: [id number]  Primary Care Provider: Sabra Magaña DO   Date and time admitted to hospital: 1/24/2024  2:03 AM    * Discitis of lumbosacral region  Assessment & Plan  MRI consistent with discitis/osteomyelitis of L1-2, L4-5, L5-S1 regions.  Neurosurgery consulted  Per ER discussion with neurosurgery, obtain blood cultures and hold off on antibiotics pending results unless patient clinically declines  WBC WNL, afebrile  Consider transfer if patient becomes unstable/develops red flag symptoms however exam remains stable  Prior provider discussed with ID - recommend IR consult   Performed 1/30 for path  Performed again 2/1 with culture - no growth  Blood cultures negative x2 negative to date  Discussed with ID and neurosurgery 2/3 - given elevated ESR/CRP and imaging findings, will empirically treat for OM/discitis.  Started on IV cefepime/vancomycin.  Will need PICC line placed - consent placed on chart 2/4.    Patient medically stable for discharge - s/p PICC line placement and CM has arranged for outpt IV abx  Patient has follow up scheduled later this month with neurosurgery.  ID recommending repeat MRI in approximately 5 weeks    Hypertension  Assessment & Plan  Home regimen includes losartan/HCTZ, metoprolol, nifedipine  Blood pressure stable    Benign prostatic hyperplasia with urinary retention  Assessment & Plan  Continue finasteride and tamsulosin    Thyroid cancer (HCC)  Assessment & Plan  S/P partial thyroidectomy  Not maintained on levothyroxine - levels have been stable  Follows with Horacio Jackson - in remission    Herniated nucleus pulposus of lumbosacral region  Assessment & Plan  S/P surgical intervention April 2023    DM (diabetes mellitus) (HCC)  Assessment & Plan  Lab Results   Component Value Date    HGBA1C 8.2 (H) 01/24/2024       Recent Labs      02/05/24  1628 02/05/24  2018 02/06/24  0737 02/06/24  1051   POCGLU 185* 159* 156* 145*         Blood Sugar Average: Last 72 hrs:  (P) 165.5731094699517208  Maintained on metformin and jardiance outpatient.   Hold while admitted, can resume on discharge    Cirrhosis, alcoholic (HCC)  Assessment & Plan  Continue outpatient follow-up  Follows with PCP      Medical Problems       Resolved Problems  Date Reviewed: 2/6/2024            Resolved    Unwitnessed fall 2/6/2024     Resolved by  Sintia Soto PA-C        Discharging Physician / Practitioner: Sintia Soto PA-C  PCP: Sabra Magaña DO  Admission Date:   Admission Orders (From admission, onward)       Ordered        01/24/24 1433  INPATIENT ADMISSION  Once                          Discharge Date: 02/06/24    Consultations During Hospital Stay:  Neurosurgery  Infectious disease  PT/OT  Case management  Interventional radiology    Procedures Performed:   IR spine biopsy 1/30: Tissue sample obtained, unable to access L4-L5 and L5-S1 disc  Repeat IR spine biopsy with disc aspiration for cultures completed 2/1    Significant Findings / Test Results:   MRI lumbar spine: Redemonstrated postsurgical changes status post L1-L5 laminectomies with posterior instrumented fusion spanning T12-S1. Evidence of bilateral S1 pedicle screw loosening, better demonstrated in same-day CT.  Multilevel acute or subacute discitis/osteomyelitis involving postsurgical level L5-S1 and to lesser degree levels L1-2 and L4-5. There is acute or subacute discitis at level L2-3 with minimal subjacent endplate osteomyelitis.  Epidural phlegmonous change pronounced at level L5-S1 without abscess. There is moderate canal stenosis at this level.  Mild ventral epidural phlegmon at level L1-2. Paravertebral inflammation/phlegmonous change in the infected levels without apparent/drainable abscess.  Degenerative change without high-grade canal or foraminal stenosis as detailed.  Blood  cultures negative to date  Cultures from IR aspiration without growth to date    Incidental Findings:   As above  I reviewed the above mentioned incidental findings with the patient and/or family and they expressed understanding.    Test Results Pending at Discharge (will require follow up):   None     Outpatient Tests Requested:  ID recommending repeat MRI lumbar spine in approximately 5 weeks to be coordinated through ID office or neurosurgery    Complications:  None    Reason for Admission: Discitis/osteomyelitis    Hospital Course:   Juan San is a 68 y.o. male patient who originally presented to the hospital on 1/24/2024 due to back pain.    Past medical history significant for chronic back pain, type 2 diabetes, BPH, thyroid cancer, hypertension, cirrhosis.  Patient presented to the emergency department due to worsening back pain.  Patient had MRI lumbar spine completed which showed concern for possible osteomyelitis/discitis.  Blood cultures were obtained and infectious disease as well as neurosurgery was consulted.  Given that patient was clinically stable, he was monitored off of antibiotics.  Given that patient was on ASA, this had to be held for a 5-day washout prior to IR attempting biopsy/aspiration.  Ultimately cultures were obtained without growth.  Despite negative cultures, given elevated ESR/CRP and imaging findings ID and neurosurgery in agreement to treat for discitis/osteomyelitis empirically.  Patient was started on IV cefepime/vancomycin and PICC line was placed 2/6.  Case management arranged for home VNA services and IV antibiotics following discharge.  Patient will need outpatient follow-up with neurosurgery which is already scheduled for later this month.  He will need repeat MRI lumbar spine in about 5 weeks.  On day of discharge patient was afebrile, hemodynamically stable and verbalized understanding for requested outpatient follow-up.    Please see above list of diagnoses and  "related plan for additional information.     Condition at Discharge: stable    Discharge Day Visit / Exam:   Subjective: Feels well.  Back pain at baseline.  Eager for discharge.  Vitals: Blood Pressure: 145/74 (02/06/24 1420)  Pulse: 94 (02/06/24 0738)  Temperature: 98.2 °F (36.8 °C) (02/06/24 1420)  Temp Source: Temporal (02/05/24 1949)  Respirations: 16 (02/06/24 1420)  Height: 5' 9\" (175.3 cm) (01/24/24 1816)  Weight - Scale: 81.6 kg (180 lb) (01/24/24 1816)  SpO2: 97 % (02/06/24 0738)  Exam:   Physical Exam  Vitals and nursing note reviewed.   Constitutional:       General: He is not in acute distress.     Appearance: He is well-developed.      Comments: No acute distress   HENT:      Head: Normocephalic and atraumatic.   Eyes:      General: No scleral icterus.     Extraocular Movements: Extraocular movements intact.      Conjunctiva/sclera: Conjunctivae normal.   Cardiovascular:      Rate and Rhythm: Normal rate and regular rhythm.      Heart sounds: No murmur heard.  Pulmonary:      Effort: Pulmonary effort is normal. No respiratory distress.      Breath sounds: Normal breath sounds. No wheezing, rhonchi or rales.   Abdominal:      General: Bowel sounds are normal.      Palpations: Abdomen is soft.      Tenderness: There is no abdominal tenderness. There is no guarding or rebound.   Musculoskeletal:         General: No swelling.      Cervical back: Normal range of motion.      Comments: Able to move upper/lower extremities bilaterally, no edema   Skin:     General: Skin is warm and dry.      Capillary Refill: Capillary refill takes less than 2 seconds.   Neurological:      General: No focal deficit present.      Mental Status: He is alert and oriented to person, place, and time.   Psychiatric:         Mood and Affect: Mood normal.         Speech: Speech normal.         Behavior: Behavior normal.          Discussion with Family: Updated  (wife) at bedside.    Discharge instructions/Information to " patient and family:   See after visit summary for information provided to patient and family.      Provisions for Follow-Up Care:  See after visit summary for information related to follow-up care and any pertinent home health orders.      Mobility at time of Discharge:   Basic Mobility Inpatient Raw Score: 24  JH-HLM Goal: 8: Walk 250 feet or more  JH-HLM Achieved: 8: Walk 250 feet ot more  HLM Goal achieved. Continue to encourage appropriate mobility.     Disposition:   Home with VNA Services (Reminder: Complete face to face encounter)    Planned Readmission: None     Discharge Statement:  I spent 65 minutes discharging the patient. This time was spent on the day of discharge. I had direct contact with the patient on the day of discharge. Greater than 50% of the total time was spent examining patient, answering all patient questions, arranging and discussing plan of care with patient as well as directly providing post-discharge instructions.  Additional time then spent on discharge activities.    Discharge Medications:  See after visit summary for reconciled discharge medications provided to patient and/or family.      **Please Note: This note may have been constructed using a voice recognition system**

## 2024-02-06 NOTE — PROCEDURES
PICC Line Insertion    Date/Time: 2/6/2024 8:50 AM    Performed by: Soha Walker  Authorized by: Justin Arriaza MD    Patient location:  [facility] protocol:     Patient identity confirmed:  Verbally with patient and arm band  Pre-procedure details:     Hand hygiene: Hand hygiene performed prior to insertion      Sterile barrier technique: All elements of maximal sterile technique followed      Skin preparation:  ChloraPrep    Skin preparation agent: Skin preparation agent completely dried prior to procedure    Indications:     PICC line indications: long term antibiotics    Anesthesia (see MAR for exact dosages):     Anesthesia method:  Local infiltration    Local anesthetic:  Lidocaine 1% w/o epi  Procedure details:     Location:  [place]    Vessel type: vein      Laterality:  Right    Approach: percutaneous technique used      Patient position:  Flat    Catheter size:  4 Fr    Landmarks identified: yes      Ultrasound guidance: yes      Ultrasound image availability:  Images available in PACS    Sterile ultrasound techniques: Sterile gel and sterile probe covers were used      Successful placement: yes      Cath access vessel: Placement verified under fluoro.    Total catheter length (cm):  42.5    Catheter out on skin (cm):  0    Max flow rate:  999    Arm circumference:  31  Post-procedure details:     Post-procedure:  Securement device placed    Assessment:  Blood return through all ports and free fluid flow    Post-procedure complications: none      Patient tolerance of procedure:  Tolerated well, no immediate complications

## 2024-02-06 NOTE — CASE MANAGEMENT
Case Management Progress Note    Patient name Juan San  Location /-01 MRN [id number]  : 1955 Date 2024       LOS (days): 13  Geometric Mean LOS (GMLOS) (days): 2.9  Days to GMLOS:-10.1        OBJECTIVE:        Current admission status: Inpatient  Preferred Pharmacy:   [facility] [address] [address]  Phone: [phone] Fax: [phone]    Primary Care Provider: Sabra Magaña DO    Primary Insurance: HU[employer]  REP  Secondary Insurance: AETNA  REP    PROGRESS NOTE: Cm spoke with [facility] regarding supply delivery for 5 pm . Pts medication are now both q12 vanco and cefe. [facility] and [facility] aware. Cm confirmed with ID.

## 2024-02-06 NOTE — PLAN OF CARE
Problem: Potential for Falls  Goal: Patient will remain free of falls  Description: INTERVENTIONS:  - Educate patient/family on patient safety including physical limitations  - Instruct patient to call for assistance with activity   - Consult OT/PT to assist with strengthening/mobility   - Keep Call bell within reach  - Keep bed low and locked with side rails adjusted as appropriate  - Keep care items and personal belongings within reach  - Initiate and maintain comfort rounds  - Make Fall Risk Sign visible to staff  - Offer Toileting every 2 Hours, in advance of need  - Initiate/Maintain call bell alarm  - Obtain necessary fall risk management equipment: call bell usage  - Apply yellow socks and bracelet for high fall risk patients  - Consider moving patient to room near nurses station  Outcome: Progressing     Problem: PAIN - ADULT  Goal: Verbalizes/displays adequate comfort level or baseline comfort level  Description: Interventions:  - Encourage patient to monitor pain and request assistance  - Assess pain using appropriate pain scale  - Administer analgesics based on type and severity of pain and evaluate response  - Implement non-pharmacological measures as appropriate and evaluate response  - Consider cultural and social influences on pain and pain management  - Notify physician/advanced practitioner if interventions unsuccessful or patient reports new pain  Outcome: Progressing     Problem: INFECTION - ADULT  Goal: Absence or prevention of progression during hospitalization  Description: INTERVENTIONS:  - Assess and monitor for signs and symptoms of infection  - Monitor lab/diagnostic results  - Monitor all insertion sites, i.e. indwelling lines, tubes, and drains  - Monitor endotracheal if appropriate and nasal secretions for changes in amount and color  - [place] appropriate cooling/warming therapies per order  - Administer medications as ordered  - Instruct and encourage patient and family to use good  hand hygiene technique  - Identify and instruct in appropriate isolation precautions for identified infection/condition  Outcome: Progressing  Goal: Absence of fever/infection during neutropenic period  Description: INTERVENTIONS:  - Monitor WBC    Outcome: Progressing

## 2024-02-06 NOTE — CASE MANAGEMENT
Case Management Progress Note    Patient name Juan San  Location /-01 MRN [id number]  : 1955 Date 2024       LOS (days): 13  Geometric Mean LOS (GMLOS) (days): 2.9  Days to GMLOS:-10.1        OBJECTIVE:        Current admission status: Inpatient  Preferred Pharmacy:   [facility] [address] [address]  Phone: [phone] Fax: [phone]    Primary Care Provider: Sabra Magaña DO    Primary Insurance: [employer] [facility] REP  Secondary Insurance: AETNA  REP    PROGRESS NOTE: [facility] will now be delivering supplies to home between 7pm-9pm. Pt and wife aware

## 2024-02-06 NOTE — ASSESSMENT & PLAN NOTE
Lab Results   Component Value Date    HGBA1C 8.2 (H) 01/24/2024       Recent Labs     02/05/24  1628 02/05/24  2018 02/06/24  0737 02/06/24  1051   POCGLU 185* 159* 156* 145*         Blood Sugar Average: Last 72 hrs:  (P) 165.0346599757586089  Maintained on metformin and jardiance outpatient.   Hold while admitted, can resume on discharge

## 2024-02-06 NOTE — CASE MANAGEMENT
Case Management Progress Note    Patient name Juan San  Location /-01 MRN [id number]  : 1955 Date 2024       LOS (days): 13  Geometric Mean LOS (GMLOS) (days): 2.9  Days to GMLOS:-9.9        OBJECTIVE:        Current admission status: Inpatient  Preferred Pharmacy:   [facility] [address] [address]  Phone: [phone] Fax: [phone]    Primary Care Provider: Sabra Magaña DO    Primary Insurance: [employer] RE[employer]  Secondary Insurance: [facility]    PROGRESS NOTE: Cm follow up for home IV abx. [facility] is able to provide Abx but they are not able to provide SN services due to insurance. Cm messaged [facility] as no other A is accepting to provide nursing based on insurance. [facility] reviewing.

## 2024-02-06 NOTE — DISCHARGE INSTR - AVS FIRST PAGE
Follow-up with PCP within 1 week for posthospitalization follow-up  Continue outpatient follow-up with neurosurgery as previously scheduled  Please contact infectious disease Dr. Shetty at number listed later in these discharge instructions with any questions or concerns regarding antibiotic treatment.  Per Dr. Shetty you may follow up with him in the office as needed.  Dr. Shetty recommends a repeat MRI in about 5 weeks which can be arranged via neurosurgery or ID office  Recommend outpatient follow up with pain management    Return to the emergency department for further evaluation with any chest pain/palpitations, shortness of breath, nausea/vomiting, abdominal pain, fever/chills, new numbness/tingling of the extremities/groin or bowel/bladder incontinence.

## 2024-02-06 NOTE — ASSESSMENT & PLAN NOTE
S/P partial thyroidectomy  Not maintained on levothyroxine - levels have been stable  Follows with [place] - in remission

## 2024-02-06 NOTE — PROGRESS NOTES
Juan aSn is a 68 y.o. male who is currently ordered Vancomycin IV with management by the Pharmacy Consult service.  Relevant clinical data and objective / subjective history reviewed.  Vancomycin Assessment:  Indication and Goal AUC/Trough: Bone/joint infection (goal -600, trough >10), -600, trough >10  Clinical Status: stable  Micro:     Renal Function:  SCr: 0.77 mg/dL  CrCl: 93.7 mL/min  Renal replacement: Not on dialysis  Days of Therapy: 4  Current Dose: 1500mg IV Q24H  Vancomycin Plan:  New Dosinmg IV Q12H  Estimated AUC: 514 mcg*hr/mL  Estimated Trough: 15.7 mcg/mL  Next Level: With AM labs at 0600 on 24  Renal Function Monitoring: Daily BMP and UOP  Pharmacy will continue to follow closely for s/sx of nephrotoxicity, infusion reactions and appropriateness of therapy.  BMP and CBC will be ordered per protocol. We will continue to follow the patient’s culture results and clinical progress daily.    Duong Mckeon, Pharmacist

## 2024-02-06 NOTE — ASSESSMENT & PLAN NOTE
MRI consistent with discitis/osteomyelitis of L1-2, L4-5, L5-S1 regions.  Neurosurgery consulted  Per ER discussion with neurosurgery, obtain blood cultures and hold off on antibiotics pending results unless patient clinically declines  WBC WNL, afebrile  Consider transfer if patient becomes unstable/develops red flag symptoms however exam remains stable  Prior provider discussed with ID - recommend IR consult   Performed 1/30 for path  Performed again 2/1 with culture - no growth  Blood cultures negative x2 negative to date  Discussed with ID and neurosurgery 2/3 - given elevated ESR/CRP and imaging findings, will empirically treat for OM/discitis.  Started on IV cefepime/vancomycin.  Will need PICC line placed - consent placed on chart 2/4.    Patient medically stable for discharge - s/p PICC line placement and  has arranged for outpt IV abx  Patient has follow up scheduled later this month with neurosurgery.  ID recommending repeat MRI in approximately 5 weeks

## 2024-02-06 NOTE — CASE MANAGEMENT
Case Management Progress Note    Patient name Juan San  Location /-01 MRN [id number]  : 1955 Date 2024       LOS (days): 13  Geometric Mean LOS (GMLOS) (days): 2.9  Days to GMLOS:-10.1        OBJECTIVE:        Current admission status: Inpatient  Preferred Pharmacy:   [facility] [address] [address]  Phone: [phone] Fax: [phone]    Primary Care Provider: Sabra Magaña DO    Primary Insurance: [employer]  REP  Secondary Insurance: [employer]  REP    PROGRESS NOTE:Cm confirmed with [facility] that they will provide a 8am and 8pm schedule. They will see pt tomorrow morning  for first medication dose and teach family to administer the 8pm dose.  [facility] will deliver all supplies today  at 5pm to home. Cm spoke with pt and his wife and they are aware that wife will need to be home to sign for supplies and then come back to the hospital to pick pt up for DC.

## 2024-02-06 NOTE — QUICK NOTE
Patient with PICC line placement earlier today with IR.  Per nursing appears to be functioning well without any concerns.  OK to use PICC for IV antibiotics.

## 2024-02-07 ENCOUNTER — HOME CARE VISIT (OUTPATIENT)
Dept: HOME HEALTH SERVICES | Facility: HOME HEALTHCARE | Age: 69
End: 2024-02-07
Payer: COMMERCIAL

## 2024-02-07 VITALS
SYSTOLIC BLOOD PRESSURE: 110 MMHG | WEIGHT: 168 LBS | TEMPERATURE: 98 F | OXYGEN SATURATION: 97 % | DIASTOLIC BLOOD PRESSURE: 70 MMHG | HEART RATE: 66 BPM | BODY MASS INDEX: 24.05 KG/M2 | RESPIRATION RATE: 20 BRPM | HEIGHT: 70 IN

## 2024-02-07 PROCEDURE — 400013 VN SOC

## 2024-02-07 PROCEDURE — G0299 HHS/HOSPICE OF RN EA 15 MIN: HCPCS

## 2024-02-07 PROCEDURE — 10330081 VN NO-PAY CLAIM PROCEDURE

## 2024-02-07 NOTE — CASE MANAGEMENT
Case Management Progress Note    Patient name Juan San  Location /-01 MRN [id number]  : 1955 Date 2024       LOS (days): 13  Geometric Mean LOS (GMLOS) (days): 2.9  Days to GMLOS:-10.2        OBJECTIVE:        Current admission status: Inpatient  Preferred Pharmacy:   [facility] [address] [address]  Phone: [phone] Fax: [phone]    Primary Care Provider: Sabra Magaña DO    Primary Insurance: [employer]  REP  Secondary Insurance: [employer]  REP    PROGRESS NOTE:    Notification made to OP CM Handoff: [facility] OP CM regarding discharge planning and disposition.   Spoke to Yoly in PCP office.

## 2024-02-07 NOTE — UTILIZATION REVIEW
NOTIFICATION OF ADMISSION DISCHARGE   This is a Notification of Discharge from [facility]. Please be advised that this patient has been discharge from our facility. Below you will find the admission and discharge date and time including the patient’s disposition.   UTILIZATION REVIEW CONTACT:  Leah Cast  Utilization   Network Utilization Review Department  Phone: [phone] carefully listen to the prompts. All voicemails are confidential.  Email: [email or web address]     ADMISSION INFORMATION  PRESENTATION DATE: 1/24/2024  2:03 AM  OBERVATION ADMISSION DATE:   INPATIENT ADMISSION DATE: 1/24/24  2:33 PM   DISCHARGE DATE: 2/6/2024  5:16 PM   DISPOSITION:Home with Home Health Care    Network Utilization Review Department  ATTENTION: Please call with any questions or concerns to [phone] and carefully listen to the prompts so that you are directed to the right person. All voicemails are confidential.   For Discharge needs, contact Care Management DC Support Team at [phone] opt. [phone]  Send all requests for admission clinical reviews, approved or denied determinations and any other requests to dedicated fax number below belonging to the campus where the patient is receiving treatment. List of dedicated fax numbers for the Facilities:  FACILITY NAME UR FAX NUMBER   ADMISSION DENIALS (Administrative/Medical Necessity) [phone]   DISCHARGE SUPPORT TEAM ([facility]) [phone]   PARENT CHILD HEALTH (Maternity/NICU/Pediatrics) [phone]   [facility] [phone]   [facility] [phone]   [facility] [phone]   [facility] [phone]   [facility] [phone]   General acute hospital [phone]   [facility] [phone]   [facility]  [phone]   [facility] [phone]   [facility] [phone]   [facility] [phone]   [facility] [phone]

## 2024-02-08 ENCOUNTER — HOME CARE VISIT (OUTPATIENT)
Dept: HOME HEALTH SERVICES | Facility: HOME HEALTHCARE | Age: 69
End: 2024-02-08
Payer: COMMERCIAL

## 2024-02-08 VITALS
OXYGEN SATURATION: 99 % | HEART RATE: 76 BPM | WEIGHT: 178 LBS | BODY MASS INDEX: 25.54 KG/M2 | TEMPERATURE: 97.5 F | RESPIRATION RATE: 20 BRPM | DIASTOLIC BLOOD PRESSURE: 70 MMHG | SYSTOLIC BLOOD PRESSURE: 130 MMHG

## 2024-02-08 PROCEDURE — G0151 HHCP-SERV OF PT,EA 15 MIN: HCPCS

## 2024-02-08 PROCEDURE — G0299 HHS/HOSPICE OF RN EA 15 MIN: HCPCS

## 2024-02-09 ENCOUNTER — TELEPHONE (OUTPATIENT)
Age: 69
End: 2024-02-09

## 2024-02-09 ENCOUNTER — HOME CARE VISIT (OUTPATIENT)
Dept: HOME HEALTH SERVICES | Facility: HOME HEALTHCARE | Age: 69
End: 2024-02-09
Payer: COMMERCIAL

## 2024-02-09 VITALS — OXYGEN SATURATION: 97 % | DIASTOLIC BLOOD PRESSURE: 60 MMHG | SYSTOLIC BLOOD PRESSURE: 110 MMHG | HEART RATE: 78 BPM

## 2024-02-09 VITALS
OXYGEN SATURATION: 98 % | RESPIRATION RATE: 18 BRPM | HEART RATE: 76 BPM | SYSTOLIC BLOOD PRESSURE: 128 MMHG | DIASTOLIC BLOOD PRESSURE: 78 MMHG | TEMPERATURE: 97.8 F

## 2024-02-09 PROCEDURE — G0152 HHCP-SERV OF OT,EA 15 MIN: HCPCS | Performed by: OCCUPATIONAL THERAPIST

## 2024-02-09 PROCEDURE — G0299 HHS/HOSPICE OF RN EA 15 MIN: HCPCS

## 2024-02-09 NOTE — TELEPHONE ENCOUNTER
Incoming call:     Patient discharged on 2/6 from hospital on IV Cefepime and Vancomycin.  [facility] nurse calling with request for lab and other necessary orders.       Please inform nurse when orders are in.  Dennis CAAL [facility] nurse  [phone]    Hospital follow up appointment scheduled virtually due to patient car ride logistics.     All questions answered. No further needs at the moment.

## 2024-02-12 ENCOUNTER — HOME CARE VISIT (OUTPATIENT)
Dept: HOME HEALTH SERVICES | Facility: HOME HEALTHCARE | Age: 69
End: 2024-02-12
Payer: COMMERCIAL

## 2024-02-12 VITALS
TEMPERATURE: 97.6 F | OXYGEN SATURATION: 98 % | DIASTOLIC BLOOD PRESSURE: 60 MMHG | SYSTOLIC BLOOD PRESSURE: 130 MMHG | RESPIRATION RATE: 18 BRPM | HEART RATE: 80 BPM

## 2024-02-12 VITALS — OXYGEN SATURATION: 98 % | SYSTOLIC BLOOD PRESSURE: 120 MMHG | DIASTOLIC BLOOD PRESSURE: 60 MMHG | HEART RATE: 70 BPM

## 2024-02-12 PROCEDURE — G0299 HHS/HOSPICE OF RN EA 15 MIN: HCPCS

## 2024-02-12 PROCEDURE — G0151 HHCP-SERV OF PT,EA 15 MIN: HCPCS

## 2024-02-15 ENCOUNTER — TELEPHONE (OUTPATIENT)
Age: 69
End: 2024-02-15

## 2024-02-15 ENCOUNTER — APPOINTMENT (OUTPATIENT)
Dept: LAB | Facility: HOSPITAL | Age: 69
End: 2024-02-15
Payer: COMMERCIAL

## 2024-02-15 ENCOUNTER — HOME CARE VISIT (OUTPATIENT)
Dept: HOME HEALTH SERVICES | Facility: HOME HEALTHCARE | Age: 69
End: 2024-02-15
Payer: COMMERCIAL

## 2024-02-15 VITALS
SYSTOLIC BLOOD PRESSURE: 160 MMHG | OXYGEN SATURATION: 97 % | DIASTOLIC BLOOD PRESSURE: 76 MMHG | TEMPERATURE: 97.1 F | HEART RATE: 68 BPM | RESPIRATION RATE: 18 BRPM

## 2024-02-15 DIAGNOSIS — M46.47 DISCITIS OF LUMBOSACRAL REGION: ICD-10-CM

## 2024-02-15 LAB
ALBUMIN SERPL BCP-MCNC: 3.9 G/DL (ref 3.5–5)
ALP SERPL-CCNC: 68 U/L (ref 34–104)
ALT SERPL W P-5'-P-CCNC: 12 U/L (ref 7–52)
ANION GAP SERPL CALCULATED.3IONS-SCNC: 7 MMOL/L
AST SERPL W P-5'-P-CCNC: 15 U/L (ref 13–39)
BASOPHILS # BLD AUTO: 0.07 THOUSANDS/ÂΜL (ref 0–0.1)
BASOPHILS NFR BLD AUTO: 1 % (ref 0–1)
BILIRUB SERPL-MCNC: 0.25 MG/DL (ref 0.2–1)
BUN SERPL-MCNC: 20 MG/DL (ref 5–25)
CALCIUM SERPL-MCNC: 9.4 MG/DL (ref 8.4–10.2)
CHLORIDE SERPL-SCNC: 101 MMOL/L (ref 96–108)
CO2 SERPL-SCNC: 30 MMOL/L (ref 21–32)
CREAT SERPL-MCNC: 0.8 MG/DL (ref 0.6–1.3)
EOSINOPHIL # BLD AUTO: 0.28 THOUSAND/ÂΜL (ref 0–0.61)
EOSINOPHIL NFR BLD AUTO: 3 % (ref 0–6)
ERYTHROCYTE [DISTWIDTH] IN BLOOD BY AUTOMATED COUNT: 16.7 % (ref 11.6–15.1)
GFR SERPL CREATININE-BSD FRML MDRD: 93 ML/MIN/1.73SQ M
GLUCOSE SERPL-MCNC: 142 MG/DL (ref 65–140)
HCT VFR BLD AUTO: 34 % (ref 36.5–49.3)
HGB BLD-MCNC: 10.5 G/DL (ref 12–17)
IMM GRANULOCYTES # BLD AUTO: 0.04 THOUSAND/UL (ref 0–0.2)
IMM GRANULOCYTES NFR BLD AUTO: 0 % (ref 0–2)
LYMPHOCYTES # BLD AUTO: 2.05 THOUSANDS/ÂΜL (ref 0.6–4.47)
LYMPHOCYTES NFR BLD AUTO: 23 % (ref 14–44)
MCH RBC QN AUTO: 25.6 PG (ref 26.8–34.3)
MCHC RBC AUTO-ENTMCNC: 30.9 G/DL (ref 31.4–37.4)
MCV RBC AUTO: 83 FL (ref 82–98)
MONOCYTES # BLD AUTO: 0.67 THOUSAND/ÂΜL (ref 0.17–1.22)
MONOCYTES NFR BLD AUTO: 7 % (ref 4–12)
NEUTROPHILS # BLD AUTO: 5.94 THOUSANDS/ÂΜL (ref 1.85–7.62)
NEUTS SEG NFR BLD AUTO: 66 % (ref 43–75)
NRBC BLD AUTO-RTO: 0 /100 WBCS
PLATELET # BLD AUTO: 282 THOUSANDS/UL (ref 149–390)
PMV BLD AUTO: 10.5 FL (ref 8.9–12.7)
POTASSIUM SERPL-SCNC: 4.2 MMOL/L (ref 3.5–5.3)
PROT SERPL-MCNC: 6.6 G/DL (ref 6.4–8.4)
RBC # BLD AUTO: 4.1 MILLION/UL (ref 3.88–5.62)
SODIUM SERPL-SCNC: 138 MMOL/L (ref 135–147)
VANCOMYCIN TROUGH SERPL-MCNC: 15.6 UG/ML (ref 10–20)
WBC # BLD AUTO: 9.05 THOUSAND/UL (ref 4.31–10.16)

## 2024-02-15 PROCEDURE — 80202 ASSAY OF VANCOMYCIN: CPT

## 2024-02-15 PROCEDURE — 36415 COLL VENOUS BLD VENIPUNCTURE: CPT

## 2024-02-15 PROCEDURE — 80053 COMPREHEN METABOLIC PANEL: CPT

## 2024-02-15 PROCEDURE — 85025 COMPLETE CBC W/AUTO DIFF WBC: CPT

## 2024-02-15 PROCEDURE — G0299 HHS/HOSPICE OF RN EA 15 MIN: HCPCS

## 2024-02-15 NOTE — TELEPHONE ENCOUNTER
Radha from [facility] calling regarding pt's virtual appointment for today. Advised that pt is not scheduled for today. Upon reviewing the notes, it appears pt was originally scheduled in error. Pt needs to be seen at [facility] since he was with their team while at [facility]. Radha made aware of this and provided [place]'s phone number. She will assist in setting the pt up for this appointment.

## 2024-02-16 ENCOUNTER — HOME CARE VISIT (OUTPATIENT)
Dept: HOME HEALTH SERVICES | Facility: HOME HEALTHCARE | Age: 69
End: 2024-02-16
Payer: COMMERCIAL

## 2024-02-16 PROCEDURE — G0151 HHCP-SERV OF PT,EA 15 MIN: HCPCS

## 2024-02-19 ENCOUNTER — HOME CARE VISIT (OUTPATIENT)
Dept: HOME HEALTH SERVICES | Facility: HOME HEALTHCARE | Age: 69
End: 2024-02-19
Payer: COMMERCIAL

## 2024-02-19 ENCOUNTER — APPOINTMENT (OUTPATIENT)
Dept: LAB | Facility: HOSPITAL | Age: 69
End: 2024-02-19
Payer: COMMERCIAL

## 2024-02-19 VITALS
HEART RATE: 86 BPM | DIASTOLIC BLOOD PRESSURE: 76 MMHG | RESPIRATION RATE: 18 BRPM | SYSTOLIC BLOOD PRESSURE: 160 MMHG | TEMPERATURE: 97.5 F | OXYGEN SATURATION: 98 %

## 2024-02-19 DIAGNOSIS — M46.42 DISCITIS OF CERVICAL REGION: ICD-10-CM

## 2024-02-19 LAB
ALBUMIN SERPL BCP-MCNC: 3.8 G/DL (ref 3.5–5)
ALP SERPL-CCNC: 77 U/L (ref 34–104)
ALT SERPL W P-5'-P-CCNC: 15 U/L (ref 7–52)
ANION GAP SERPL CALCULATED.3IONS-SCNC: 11 MMOL/L
AST SERPL W P-5'-P-CCNC: 18 U/L (ref 13–39)
BASOPHILS # BLD AUTO: 0.08 THOUSANDS/ÂΜL (ref 0–0.1)
BASOPHILS NFR BLD AUTO: 1 % (ref 0–1)
BILIRUB SERPL-MCNC: 0.22 MG/DL (ref 0.2–1)
BUN SERPL-MCNC: 19 MG/DL (ref 5–25)
CALCIUM SERPL-MCNC: 9.1 MG/DL (ref 8.4–10.2)
CHLORIDE SERPL-SCNC: 101 MMOL/L (ref 96–108)
CO2 SERPL-SCNC: 25 MMOL/L (ref 21–32)
CREAT SERPL-MCNC: 1 MG/DL (ref 0.6–1.3)
EOSINOPHIL # BLD AUTO: 0.34 THOUSAND/ÂΜL (ref 0–0.61)
EOSINOPHIL NFR BLD AUTO: 4 % (ref 0–6)
ERYTHROCYTE [DISTWIDTH] IN BLOOD BY AUTOMATED COUNT: 17 % (ref 11.6–15.1)
GFR SERPL CREATININE-BSD FRML MDRD: 78 ML/MIN/1.73SQ M
GLUCOSE SERPL-MCNC: 228 MG/DL (ref 65–140)
HCT VFR BLD AUTO: 33.4 % (ref 36.5–49.3)
HGB BLD-MCNC: 10.1 G/DL (ref 12–17)
IMM GRANULOCYTES # BLD AUTO: 0.04 THOUSAND/UL (ref 0–0.2)
IMM GRANULOCYTES NFR BLD AUTO: 0 % (ref 0–2)
LYMPHOCYTES # BLD AUTO: 1.86 THOUSANDS/ÂΜL (ref 0.6–4.47)
LYMPHOCYTES NFR BLD AUTO: 20 % (ref 14–44)
MCH RBC QN AUTO: 25.8 PG (ref 26.8–34.3)
MCHC RBC AUTO-ENTMCNC: 30.2 G/DL (ref 31.4–37.4)
MCV RBC AUTO: 85 FL (ref 82–98)
MONOCYTES # BLD AUTO: 0.63 THOUSAND/ÂΜL (ref 0.17–1.22)
MONOCYTES NFR BLD AUTO: 7 % (ref 4–12)
NEUTROPHILS # BLD AUTO: 6.31 THOUSANDS/ÂΜL (ref 1.85–7.62)
NEUTS SEG NFR BLD AUTO: 68 % (ref 43–75)
NRBC BLD AUTO-RTO: 0 /100 WBCS
PLATELET # BLD AUTO: 316 THOUSANDS/UL (ref 149–390)
PMV BLD AUTO: 10.5 FL (ref 8.9–12.7)
POTASSIUM SERPL-SCNC: 4.2 MMOL/L (ref 3.5–5.3)
PROT SERPL-MCNC: 6.5 G/DL (ref 6.4–8.4)
RBC # BLD AUTO: 3.91 MILLION/UL (ref 3.88–5.62)
SODIUM SERPL-SCNC: 137 MMOL/L (ref 135–147)
VANCOMYCIN TROUGH SERPL-MCNC: 16.1 UG/ML (ref 10–20)
WBC # BLD AUTO: 9.26 THOUSAND/UL (ref 4.31–10.16)

## 2024-02-19 PROCEDURE — G0299 HHS/HOSPICE OF RN EA 15 MIN: HCPCS

## 2024-02-19 PROCEDURE — 85025 COMPLETE CBC W/AUTO DIFF WBC: CPT

## 2024-02-19 PROCEDURE — 80202 ASSAY OF VANCOMYCIN: CPT

## 2024-02-19 PROCEDURE — 80053 COMPREHEN METABOLIC PANEL: CPT

## 2024-02-19 PROCEDURE — 36415 COLL VENOUS BLD VENIPUNCTURE: CPT

## 2024-02-20 ENCOUNTER — HOSPITAL ENCOUNTER (OUTPATIENT)
Dept: RADIOLOGY | Facility: HOSPITAL | Age: 69
Discharge: HOME | End: 2024-02-20
Payer: COMMERCIAL

## 2024-02-20 ENCOUNTER — HOME CARE VISIT (OUTPATIENT)
Dept: HOME HEALTH SERVICES | Facility: HOME HEALTHCARE | Age: 69
End: 2024-02-20
Payer: COMMERCIAL

## 2024-02-20 DIAGNOSIS — M86.9 OSTEOMYELITIS (HCC): ICD-10-CM

## 2024-02-20 DIAGNOSIS — M46.40 DISCITIS: ICD-10-CM

## 2024-02-20 DIAGNOSIS — M46.47 DISCITIS OF LUMBOSACRAL REGION: ICD-10-CM

## 2024-02-20 PROCEDURE — 72100 X-RAY EXAM L-S SPINE 2/3 VWS: CPT

## 2024-02-22 ENCOUNTER — HOME CARE VISIT (OUTPATIENT)
Dept: HOME HEALTH SERVICES | Facility: HOME HEALTHCARE | Age: 69
End: 2024-02-22
Payer: COMMERCIAL

## 2024-02-22 PROCEDURE — G0157 HHC PT ASSISTANT EA 15: HCPCS

## 2024-02-23 ENCOUNTER — OFFICE VISIT (OUTPATIENT)
Dept: NEUROSURGERY | Facility: CLINIC | Age: 69
End: 2024-02-23
Payer: COMMERCIAL

## 2024-02-23 VITALS
HEIGHT: 70 IN | OXYGEN SATURATION: 100 % | TEMPERATURE: 98.3 F | RESPIRATION RATE: 20 BRPM | BODY MASS INDEX: 25.62 KG/M2 | WEIGHT: 179 LBS | SYSTOLIC BLOOD PRESSURE: 160 MMHG | DIASTOLIC BLOOD PRESSURE: 80 MMHG | HEART RATE: 80 BPM

## 2024-02-23 DIAGNOSIS — M46.47 DISCITIS OF LUMBOSACRAL REGION: Primary | ICD-10-CM

## 2024-02-23 PROCEDURE — 99214 OFFICE O/P EST MOD 30 MIN: CPT | Performed by: PHYSICIAN ASSISTANT

## 2024-02-23 NOTE — ASSESSMENT & PLAN NOTE
4 week follow up of L5-S1 OM/discitis with epidural phlegmon  IR biopsy with no growth, on Vanc/Cefepime until 3/8 per ID  s/p T12-S1 PLDF with Dr. Moraes 4/11/23  Continues with back pain at waist level that radiates down the buttocks and posterolateral thighs and calves to ankles bilaterally. No BBI. Ambulates very unsteady with a walker. Wearing LSO brace intermittently  Multiple ED visits for back pain/falls/weakness since August.  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  Has been doing PT since September. Follows with Dr. Barrera (pain management) and in the past recommended for possible open SCS trial at [facility].  Exam: healed spinal incision, no midline spinal TTP.  BLE-HF 4-/5, otherwise 4/5.  Diminished LT to bilateral thighs and calves.    Imagings:  XR lumbar 2/20/24: Stable lumbar spine radiographs with redemonstrated postsurgical changes and L5-S1 discitis osteomyelitis complex.     Plan:  Reviewed imaging with patient and wife. Stable changes of OM at L5-S1.   Continue antibiotics per ID.  May wear LSO brace as needed when upright.   Pain control as directed by either PCP or pain management. His current regimen is not providing adequate relief.   Follow up in 6 weeks with XR to see AP. Should be after ID appointment.   Call sooner with any questions or concerns.

## 2024-02-23 NOTE — PROGRESS NOTES
Neurosurgery Office Note  Juan San 68 y.o. male MRN: [id number]      Assessment/Plan     Discitis of lumbosacral region  4 week follow up of L5-S1 OM/discitis with epidural phlegmon  IR biopsy with no growth, on Vanc/Cefepime until 3/8 per ID  s/p T12-S1 PLDF with Dr. Moraes 4/11/23  Continues with back pain at waist level that radiates down the buttocks and posterolateral thighs and calves to ankles bilaterally. No BBI. Ambulates very unsteady with a walker. Wearing LSO brace intermittently  Multiple ED visits for back pain/falls/weakness since August.  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  Has been doing PT since September. Follows with Dr. Barrera (pain management) and in the past recommended for possible open SCS trial at [facility].  Exam: healed spinal incision, no midline spinal TTP.  BLE-HF 4-/5, otherwise 4/5.  Diminished LT to bilateral thighs and calves.    Imagings:  XR lumbar 2/20/24: Stable lumbar spine radiographs with redemonstrated postsurgical changes and L5-S1 discitis osteomyelitis complex.     Plan:  Reviewed imaging with patient and wife. Stable changes of OM at L5-S1.   Continue antibiotics per ID.  May wear LSO brace as needed when upright.   Pain control as directed by either PCP or pain management. His current regimen is not providing adequate relief.   Follow up in 6 weeks with XR to see AP. Should be after ID appointment.   Call sooner with any questions or concerns.        Diagnoses and all orders for this visit:    Discitis of lumbosacral region  -     XR spine lumbar 2 or 3 views injury; Future          I have spent a total time of 40 minutes on 02/23/24 in caring for this patient including Diagnostic results, Instructions for management, Patient and family education, Impressions, Counseling / Coordination of care, Documenting in the medical record, Reviewing / ordering tests, medicine, procedures  , and Obtaining or reviewing history  .      CHIEF COMPLAINT    Chief  Complaint   Patient presents with    Follow-up     S/P lumbar fusion          HISTORY    History of Present Illness     68 y.o. year old male     68 year old gentleman returns for 4 week hospital follow up of L5-S1 OM/discitis. IR biopsy showed no growth, currently being treated with Vanco and Cefepime per ID until 3/8/24. He is wearing his LSO brace intermittently when upright. Unfortunately he continues to have severe back pain at waist level as well as radiating pain down the posterolateral thighs and calves to the ankles. Spasms as well. He has weakness in his legs. The pain is constant regardless of position. Notes baseline neuropathy in his feet. He is very unsteady and has to use a walker. Still having frequent falls including today. Denies BBI. Takes extra strength tylenol, robaxin and gabapentin without significant relief. Doing PT since September. He is s/p T12-S1 posterior instrumented fixation fusion, L1-2 and L4-5 bilateral laminectomies and total facetectomies, L2-3 and L3-4 bilateral laminectomies and medial facetectomies, L5-S1 bilateral laminal foraminotomies 4/11/23 by Dr. Moraes in the setting of neurogenic claudication and radiculopathy.         See Discussion    REVIEW OF SYSTEMS    Review of Systems   Constitutional:  Positive for activity change (worn out), appetite change (no appetite) and fatigue.   HENT: Negative.     Eyes: Negative.    Respiratory: Negative.     Cardiovascular: Negative.    Gastrointestinal:  Positive for nausea.        Some pain with BM, hurts around coccyx to sit   Endocrine: Negative.    Genitourinary:  Positive for difficulty urinating (self cath does empty enough).        Self cath problems with prostate   Musculoskeletal:  Positive for arthralgias (cramps, shooting pains w/ movement), back pain (lower back pain (waist area) radiates side to sides down both buttock and all the way down his legs to ankles constant pain dull achey feeling to severe), gait problem (using  cane, 2 falls since last seen hit back  1/10 frequent falls, last fall 1/8) and myalgias (muscle pain/tightness/spams in back ). Neck pain: sometimes feels a little pain .       6 WK POV,  L1-S1 JANY POST DECOMP/FUSION- W XRAY     rates pain today 10/10 MID to LBP w occasional b/l Leg pain   6 WK POV,  L1-S1 JANY POST DECOMP/FUSION- W XRAY   rates pain today 10/10 MID to LBP w occasional b/l Leg pain   patient is asking for Diluadi refill   pt did not complete xray    Skin: Negative.    Allergic/Immunologic: Negative.    Neurological:  Positive for weakness (sometimes in BLE) and numbness (sometimes in his feet from neuropathy  and sometimes in his arms + hands).   Hematological: Negative.    Psychiatric/Behavioral:  Positive for sleep disturbance (pain keeping him up at night).    All other systems reviewed and are negative.      ROS obtained by MA. Reviewed. See HPI.     Meds/Allergies     Current Outpatient Medications   Medication Sig Dispense Refill    ACCU-CHEK FABIANO PLUS test strip 4 (four) times a day  1    ACCU-CHEK FASTCLIX LANCETS MISC 4 (four) times a day  1    acetaminophen (TYLENOL) 500 mg tablet Take 1 tablet (500 mg total) by mouth every 6 (six) hours as needed for mild pain 500 mg tablet  0    cefepime (MAXIPIME) 2 g injection Inject 2 g into a catheter in a vein 2 (two) times a day. 2oml IVP over 3-5 minutes   Indications: Bacteria in the Blood      CHOLECALCIFEROL PO Take 5,000 Units by mouth daily      clonazePAM (KlonoPIN) 1 mg tablet Take 1 mg by mouth 2 (two) times a day & 3rd pill PRN      finasteride (PROSCAR) 5 mg tablet Take 1 tablet (5 mg total) by mouth daily 90 tablet 3    fluticasone (FLONASE) 50 mcg/act nasal spray 1 spray into each nostril daily as needed      folic acid (FOLVITE) 1 mg tablet Take 2,000 mcg by mouth daily  6    gabapentin (NEURONTIN) 400 mg capsule Take 400 mg by mouth 2 (two) times a day. Indications: Neuropathic Pain      GNP Aspirin Low Dose 81 MG EC tablet Take 1  tablet (81 mg total) by mouth daily Do not start before April 25, 2023.  0    Heparin Na, Pork, Lock Flsh PF 10 units/mL 10 Units by Intracatheter route 2 (two) times a day. Flush PICC 2x daily after IV antibiotic administration  Indications: Prevention of Closure of Indwelling Catheter      Jardiance 10 MG TABS Take 10 mg by mouth every morning      ketoconazole (NIZORAL) 2 % shampoo Apply 1 application. topically daily Use as directed  6    lidocaine (LIDODERM) 5 % Apply 1 patch topically over 12 hours daily Remove & Discard patch within 12 hours or as directed by MD Do not start before October 12, 2023. 5 patch 0    losartan-hydrochlorothiazide (HYZAAR) 100-25 MG per tablet Take 1 tablet by mouth every morning  0    lovastatin (MEVACOR) 20 mg tablet Take 20 mg by mouth every morning  3    melatonin 3 mg Take 1 tablet (3 mg total) by mouth daily at bedtime 30 tablet 0    metFORMIN (GLUCOPHAGE) 1000 MG tablet Take 1,000 mg by mouth 2 (two) times a day with meals       methocarbamol (ROBAXIN) 750 mg tablet Take 1 tablet (750 mg total) by mouth every 8 (eight) hours as needed for muscle spasms 15 tablet 0    METOPROLOL SUCCINATE ER PO Take 75 mg by mouth every morning      mirtazapine (REMERON) 45 MG tablet Take 45 mg by mouth daily at bedtime  1    NIFEdipine (PROCARDIA XL) 90 mg 24 hr tablet Take 120 mg by mouth every morning Takes 90 mg and 30 mg =120 mg every AM  3    senna (SENOKOT) 8.6 mg Take 1 tablet (8.6 mg total) by mouth 2 (two) times a day 60 tablet 0    sertraline (ZOLOFT) 100 mg tablet Take 100 mg by mouth every morning      sodium chloride (Monoject Sodium Chloride Flush) 0.9 % SOLN Inject 10 mL into a catheter in a vein 2 (two) times a day. Use before and after IV antibiotic administration   Indications: FLUSHING      tamsulosin (FLOMAX) 0.4 mg Take 1 capsule (0.4 mg total) by mouth daily with dinner 90 capsule 3    vancomycin (VANCOCIN) 1 g Inject 1 g into a catheter in a vein 2 (two) times a day.  100ml  easy pump    Administer over 1 hour  Indications: Infection caused by Bacteria      gabapentin (NEURONTIN) 400 mg capsule Take 1 capsule (400 mg total) by mouth daily at bedtime 30 capsule 0     No current facility-administered medications for this visit.       Allergies   Allergen Reactions    Abilify [Aripiprazole] Tremor     Shaking      Cephalexin Diarrhea    Molds & Smuts Allergic Rhinitis    Pregabalin Tremor     Lyrica - shaking feeling       PAST HISTORY    Past Medical History:   Diagnosis Date    Anxiety     Arthritis     Cancer (HCC)     Depression     Diabetes mellitus (HCC)     Hypertension     Liver disease     Mitral valve prolapse     Peripheral neuropathy     Neuropathy    PONV (postoperative nausea and vomiting)     Thyroid disease        Past Surgical History:   Procedure Laterality Date    COLONOSCOPY      INCISION AND DRAINAGE OF WOUND Left 2022    Procedure: INCISION AND DRAINAGE (I&D) EXTREMITY;  Surgeon: Moustapha Cote DPM;  Location:  MAIN OR;  Service: Podiatry    IR BIOPSY SPINE  2024    IR BIOPSY SPINE  2024    IR PICC PLACEMENT SINGLE LUMEN  2024    JOINT REPLACEMENT Left 2022    Left TSA    MD ARTHRODESIS POSTERIOR/PSTLAT TQ 1NTRSPC LUMBAR Bilateral 2023    Procedure: L1-S1 navigated posterior decompression with instrumented fixation fusion;  Surgeon: James Moraes MD;  Location:  MAIN OR;  Service: Neurosurgery    THYROID SURGERY      remove cancer       Social History     Tobacco Use    Smoking status: Former     Current packs/day: 0.00     Average packs/day: 0.3 packs/day for 5.9 years (1.5 ttl pk-yrs)     Types: Cigarettes     Start date: 1975     Quit date: 1981     Years since quittin.7    Smokeless tobacco: Never    Tobacco comments:     quit    Vaping Use    Vaping status: Some Days   Substance Use Topics    Alcohol use: Yes     Alcohol/week: 4.0 - 7.0 standard drinks of alcohol     Types: 1 - 2 Glasses of wine, 2  "- 3 Cans of beer, 1 - 2 Shots of liquor per week     Comment: only on special occasions    Drug use: Yes     Frequency: 7.0 times per week     Types: Marijuana     Comment: Medical Marijuana       Family History   Problem Relation Age of Onset    No Known Problems Father     No Known Problems Mother     Colon cancer Neg Hx          Above history personally reviewed.       EXAM    Vitals:Blood pressure 160/80, pulse 80, temperature 98.3 °F (36.8 °C), temperature source Temporal, resp. rate 20, height 5' 10\" (1.778 m), weight 81.2 kg (179 lb), SpO2 100%.,Body mass index is 25.68 kg/m².     Physical Exam  Vitals reviewed.   Constitutional:       General: He is awake.      Appearance: Normal appearance.   HENT:      Head: Normocephalic and atraumatic.   Eyes:      Conjunctiva/sclera: Conjunctivae normal.   Cardiovascular:      Rate and Rhythm: Normal rate.   Pulmonary:      Effort: Pulmonary effort is normal.   Musculoskeletal:      Comments: Healed spinal incision  No midline or paraspinal muscle TTP   Skin:     General: Skin is warm and dry.   Neurological:      Mental Status: He is alert and oriented to person, place, and time.      Deep Tendon Reflexes:      Reflex Scores:       Patellar reflexes are 2+ on the right side and 2+ on the left side.  Psychiatric:         Attention and Perception: Attention and perception normal.         Mood and Affect: Mood and affect normal.         Speech: Speech normal.         Behavior: Behavior normal. Behavior is cooperative.         Thought Content: Thought content normal.         Cognition and Memory: Cognition and memory normal.         Judgment: Judgment normal.         Neurologic Exam     Mental Status   Oriented to person, place, and time.   Follows 2 step commands.   Attention: normal. Concentration: normal.   Speech: speech is normal   Level of consciousness: alert  Knowledge: good.   Normal comprehension.     Motor Exam   Muscle bulk: normal  Overall muscle tone: " normal  BLE - HF 3/5, otherwise 4/5     Sensory Exam   Right leg light touch: diminished LT to lateral thigh and calf.  Left leg light touch: diminished LT to lateral thigh and calf.    Gait, Coordination, and Reflexes     Tremor   Resting tremor: absent  Intention tremor: absent  Action tremor: absent    Reflexes   Right patellar: 2+  Left patellar: 2+  Right ankle clonus: absent  Left ankle clonus: absent  In wheelchair  Unsteady with standing          MEDICAL DECISION MAKING    Imaging Studies:     XR spine lumbar 2 or 3 views injury    Result Date: 2/20/2024  Narrative: LUMBAR SPINE INDICATION:   Discitis, unspecified, site unspecified. Osteomyelitis, unspecified. Discitis, unspecified, lumbosacral region. COMPARISON: 1/25/2024 VIEWS:  XR SPINE LUMBAR 2 OR 3 VIEWS INJURY Images: 2 FINDINGS: There are 5 non rib bearing lumbar vertebral bodies. Redemonstrated sequela L5-S1 discitis osteomyelitis complex with redemonstrated endplate destruction and sclerosis/erosion on both sides of the disc. Alignment is unremarkable. Stable posterior spinal fusion with bilateral rods and pedicle screws spanning T12-S1 with unchanged lucency about the S1 screws, more prominent on the left. The pedicles appear intact. Soft tissues are unremarkable.     Impression: Stable lumbar spine radiographs with redemonstrated postsurgical changes and L5-S1 discitis osteomyelitis complex. Electronically signed: 02/20/2024 02:43 PM Benedicto Dickson MPH,MD    IR PICC line placement single lumen (preferred for home antibiotics/medications)    Result Date: 2/6/2024  Narrative: PICC - CENTRAL VENOUS CATHETER PLACEMENT HISTORY: Back infection. Leukocytosis. PROCEDURE: The patient was brought to the Interventional Radiology Suite and identified verbally and by wrist band.   All elements of maximal sterile barrier technique, cap and mask and sterile gown and sterile gloves and sterile full-body drape and hand hygiene and 2% chlorhexidine for cutaneous  antisepsis.  Sterile ultrasound technique with sterile gel and sterile probe covers was also utilized.  The right arm was surveyed with ultrasound to assess for vessel patency. Under local anesthesia and direct sonographic guidance, a 21 gauge needle was placed into the right basilic vein.  A static sonographic image was saved. This was followed by a .018 guidewire and a sheath and dilator tapered to .018. A 4 Frisian single lumen PICC was advanced under fluoroscopic guidance to the cavoatrial region.  The catheter was cut at 42.5 with 0 external length.  The position was confirmed fluoroscopically.  Blood could be freely aspirated.  Patient experienced no untoward reactions. The procedure was performed by the interventional radiology staff. Fluoroscopy time: 0.5 minutes Images: 1     Impression: Successful placement of a peripheral 4 Frisian single lumen PICC with its tip placed in the cavoatrial region under fluoroscopic guidance. Workstation performed: [id number]     IR biopsy spine    Result Date: 2/1/2024  Narrative: PROCEDURE: Fluoroscopy-guided L5-S1 disc space biopsy Procedural Personnel Attending physician(s): Dr. Smith Pre-procedure diagnosis: Discitis osteomyelitis Post-procedure diagnosis: Same Indication: Patient with back pain noted to have discitis osteomyelitis at L5-S1, L1-L2, L4-L5, and discitis at L2-L3. Biopsy sample needed for cultures. PROCEDURE SUMMARY: - Percutaneous fluoro-guided L5-S1 disc space biopsy PROCEDURE DETAILS: Pre-procedure Consent: Informed consent for the procedure including risks, benefits and alternatives was obtained and time-out was performed prior to the procedure. Preparation: The site was prepared and draped using maximal sterile barrier technique including cutaneous antisepsis. Anesthesia/sedation Level of anesthesia/sedation: General anesthesia Anesthesia/sedation administered by: Anesthesiology Total intra-service sedation time (minutes): 60 Biopsy Local anesthesia was  administered. Under fluoroscopic guidance, a 10-gauge introducer needle was advanced to the L5-S1 disc space. 12-gauge bone biopsy needle was advanced through the introducer needle and was used to obtain 1 core needle sample which was placed into sterile cup for cultures. Needle was removed and bandage applied. Radiation Dose Fluoroscopy time (minutes): 1.4 Reference air kerma (mGy): 13 Additional Details Specimens removed: 1 core needle sample placed into sterile cup for cultures. Estimated blood loss (mL): None Complications: No immediate complications.     Impression: Fluoroscopy guided L5-S1 disc space biopsy. Plan: Specimen sent for cultures. Workstation performed: [id number]     IR biopsy spine    Result Date: 1/30/2024  Narrative: CT-scan guided needle biopsy of L1-L2 disc History: Multilevel discitis osteomyelitis. Radiation dose: 609.46 mGy Concious sedation time: 22 min Technique: This examination, like all CT scans performed in the [facility], was performed utilizing techniques to minimize radiation dose exposure, including the use of iterative reconstruction and automated exposure control. The patient was brought to the CT scanner and placed prone on the table. After axial images were obtained through the region of interest an area of the skin was then marked, prepped, and draped in usual sterile fashion. Lidocaine was administered to the skin and a small skin incision was made. A 18 gauge needle was advanced into the L1-L2 disc and aspiration specimen was obtained. Second additional pass made. The specimen was sent in sterile cup The patient tolerated the procedure well and suffered no complications.     Impression: Impression: Successful percutaneous fine needle aspiration biopsy of L1-L2 disc. Other levels at L4-L5 and L5-S1 could not be accessed due to bony pelvis. Workstation performed: [id number]     CT pelvis w contrast    Result Date: 1/25/2024  Narrative: CT PELVIS WITH IV  CONTRAST INDICATION: Z98.1: Arthrodesis status. Reviewed neurosurgery note 1/10/2024: Status post T12-S1 PLIF 4/11/2023. Multiple ED visits for back pain/fall/weakness. Known screw loosening at bilateral sacrum. Weight loss, lack of appetite, hot flashes/chills. Lisavivien Velascomookie's note from 1/10/2024 was reviewed. This CT was ordered to evaluate the lucencies seen in the bilateral iliac bones on the prior noncontrast pelvic CT. COMPARISON: CT pelvis and MRI lumbar 1/24/2024. TECHNIQUE: CT examination of the pelvis was performed. Multiplanar 2D reformatted images were created from the source data. This examination, like all CT scans performed in the [facility], was performed utilizing techniques to minimize radiation dose exposure, including the use of iterative reconstruction and automated exposure control. Radiation dose length product (DLP) for this visit: 306.11 mGy-cm . IV Contrast: 100 mL of iohexol (OMNIPAQUE) Enteric Contrast: Not administered. FINDINGS: REPRODUCTIVE ORGANS: Enlarged prostate. URINARY BLADDER: Air within the bladder. Urinary bladder wall is also mildly thickened. VISUALIZED BOWEL: Unremarkable. APPENDIX: No findings to suggest appendicitis. ABDOMINOPELVIC CAVITY: No ascites. No pneumoperitoneum. No lymphadenopathy. VISUALIZED VESSELS: Moderate sclerotic calcifications of the visualized aorta. ABDOMINOPELVIC WALL/INGUINAL REGIONS: Unremarkable. BONES: No acute fracture. Partially visualized T12-S1 posterior fusion hardware redemonstrated with lucency surrounding the bilateral S1 pedicle screws. Multilevel discitis/osteomyelitis was better characterized on lumbar MRI 1/24/2024. Numerous lucencies remonstrated throughout the bilateral iliac bones.     Impression: Numerous lucencies remonstrated throughout the bilateral iliac bones. These have nonspecific appearance on CT, but based on prior lumbar spine MR T1-weighted images, these areas are T1 hyperintense and therefore are  consistent with fatty marrow/osteopenia, rather than malignancy. Urinary bladder wall is mildly thickened, for which urinalysis is recommended to rule out urinary tract infection. There is also air in the urinary bladder lumen, which could represent emphysematous cystitis, but also could be due to recent bladder instrumentation. Partially visualized T12-S1 posterior fusion hardware redemonstrated lucency surrounding the bilateral S1 pedicle screws. Multilevel discitis/osteomyelitis was better characterized on lumbar MRI 1/24/2024. Resident: TEJINDER DYER I, the attending radiologist, have reviewed the images and agree with the final report above. Workstation performed: [id number]     XR spine lumbar 2 or 3 views injury    Result Date: 1/25/2024  Narrative: LUMBAR SPINE INDICATION:   L5-S1 osteo. COMPARISON: 1/8/2024 VIEWS:  XR SPINE LUMBAR 2 OR 3 VIEWS INJURY FINDINGS: There are 5 non rib bearing lumbar vertebral bodies. Redemonstrated osseous sequelae of discitis/osteomyelitis at L5-S1, including endplate destruction and mixed subchondral erosion and sclerosis on both sides of the disc. Redemonstrated is instrumented posterior spinal fusion with bilateral rods and pedicle screws spanning T12-S1. Lucency again seen at the bone-metal interface of the S1 screws. Alignment is unchanged. Soft tissues are unremarkable.     Impression: Redemonstrated osseous sequelae of L5-S1 discitis/osteomyelitis. Instrumented posterior spinal fusion spanning T12-S1. Lucency again seen at the bone-metal interface of the S1 screws consistent with loosening. Workstation performed: [id number]       I have personally reviewed pertinent reports.   and I have personally reviewed pertinent films in PACS

## 2024-02-25 ENCOUNTER — HOME CARE VISIT (OUTPATIENT)
Dept: HOME HEALTH SERVICES | Facility: HOME HEALTHCARE | Age: 69
End: 2024-02-25
Payer: COMMERCIAL

## 2024-02-25 NOTE — CASE COMMUNICATION
1054...Patients wife called into office reporting that SN scheduled to go visit tomorrow and she wanted to ensure SN had the correct address, as they recently moved. [address] [place] [address]

## 2024-02-26 ENCOUNTER — APPOINTMENT (OUTPATIENT)
Dept: LAB | Facility: HOSPITAL | Age: 69
End: 2024-02-26
Payer: COMMERCIAL

## 2024-02-26 ENCOUNTER — HOME CARE VISIT (OUTPATIENT)
Dept: HOME HEALTH SERVICES | Facility: HOME HEALTHCARE | Age: 69
End: 2024-02-26
Payer: COMMERCIAL

## 2024-02-26 VITALS
HEART RATE: 104 BPM | TEMPERATURE: 97.7 F | OXYGEN SATURATION: 100 % | SYSTOLIC BLOOD PRESSURE: 136 MMHG | DIASTOLIC BLOOD PRESSURE: 62 MMHG

## 2024-02-26 DIAGNOSIS — M46.47 DISCITIS OF LUMBOSACRAL REGION: ICD-10-CM

## 2024-02-26 DIAGNOSIS — Z16.22 RESISTANCE TO VANCOMYCIN RELATED ANTIBIOTICS: ICD-10-CM

## 2024-02-26 LAB
ALBUMIN SERPL BCP-MCNC: 4.4 G/DL (ref 3.5–5)
ALP SERPL-CCNC: 90 U/L (ref 34–104)
ALT SERPL W P-5'-P-CCNC: 13 U/L (ref 7–52)
ANION GAP SERPL CALCULATED.3IONS-SCNC: 9 MMOL/L
AST SERPL W P-5'-P-CCNC: 18 U/L (ref 13–39)
BASOPHILS # BLD AUTO: 0.08 THOUSANDS/ÂΜL (ref 0–0.1)
BASOPHILS NFR BLD AUTO: 1 % (ref 0–1)
BILIRUB SERPL-MCNC: 0.35 MG/DL (ref 0.2–1)
BUN SERPL-MCNC: 20 MG/DL (ref 5–25)
CALCIUM SERPL-MCNC: 9.8 MG/DL (ref 8.4–10.2)
CHLORIDE SERPL-SCNC: 97 MMOL/L (ref 96–108)
CO2 SERPL-SCNC: 30 MMOL/L (ref 21–32)
CREAT SERPL-MCNC: 0.84 MG/DL (ref 0.6–1.3)
EOSINOPHIL # BLD AUTO: 0.18 THOUSAND/ÂΜL (ref 0–0.61)
EOSINOPHIL NFR BLD AUTO: 1 % (ref 0–6)
ERYTHROCYTE [DISTWIDTH] IN BLOOD BY AUTOMATED COUNT: 16.3 % (ref 11.6–15.1)
GFR SERPL CREATININE-BSD FRML MDRD: 91 ML/MIN/1.73SQ M
GLUCOSE SERPL-MCNC: 214 MG/DL (ref 65–140)
HCT VFR BLD AUTO: 39.3 % (ref 36.5–49.3)
HGB BLD-MCNC: 11.8 G/DL (ref 12–17)
IMM GRANULOCYTES # BLD AUTO: 0.07 THOUSAND/UL (ref 0–0.2)
IMM GRANULOCYTES NFR BLD AUTO: 1 % (ref 0–2)
LYMPHOCYTES # BLD AUTO: 1.44 THOUSANDS/ÂΜL (ref 0.6–4.47)
LYMPHOCYTES NFR BLD AUTO: 11 % (ref 14–44)
MCH RBC QN AUTO: 25.1 PG (ref 26.8–34.3)
MCHC RBC AUTO-ENTMCNC: 30 G/DL (ref 31.4–37.4)
MCV RBC AUTO: 84 FL (ref 82–98)
MONOCYTES # BLD AUTO: 0.69 THOUSAND/ÂΜL (ref 0.17–1.22)
MONOCYTES NFR BLD AUTO: 5 % (ref 4–12)
NEUTROPHILS # BLD AUTO: 10.45 THOUSANDS/ÂΜL (ref 1.85–7.62)
NEUTS SEG NFR BLD AUTO: 81 % (ref 43–75)
NRBC BLD AUTO-RTO: 0 /100 WBCS
PLATELET # BLD AUTO: 369 THOUSANDS/UL (ref 149–390)
PMV BLD AUTO: 10.5 FL (ref 8.9–12.7)
POTASSIUM SERPL-SCNC: 4.5 MMOL/L (ref 3.5–5.3)
PROT SERPL-MCNC: 7.4 G/DL (ref 6.4–8.4)
RBC # BLD AUTO: 4.7 MILLION/UL (ref 3.88–5.62)
SODIUM SERPL-SCNC: 136 MMOL/L (ref 135–147)
VANCOMYCIN TROUGH SERPL-MCNC: 17.9 UG/ML (ref 10–20)
WBC # BLD AUTO: 12.91 THOUSAND/UL (ref 4.31–10.16)

## 2024-02-26 PROCEDURE — 80202 ASSAY OF VANCOMYCIN: CPT

## 2024-02-26 PROCEDURE — G0299 HHS/HOSPICE OF RN EA 15 MIN: HCPCS

## 2024-02-26 PROCEDURE — 85025 COMPLETE CBC W/AUTO DIFF WBC: CPT

## 2024-02-26 PROCEDURE — 36415 COLL VENOUS BLD VENIPUNCTURE: CPT

## 2024-02-26 PROCEDURE — 80053 COMPREHEN METABOLIC PANEL: CPT

## 2024-02-27 ENCOUNTER — HOME CARE VISIT (OUTPATIENT)
Dept: HOME HEALTH SERVICES | Facility: HOME HEALTHCARE | Age: 69
End: 2024-02-27
Payer: COMMERCIAL

## 2024-02-27 ENCOUNTER — TELEPHONE (OUTPATIENT)
Age: 69
End: 2024-02-27

## 2024-02-27 NOTE — TELEPHONE ENCOUNTER
Please schedule follow-up in office as he is not been seen since October with NP/PA.  If pain is severe needs to go to the emergency department.

## 2024-02-27 NOTE — TELEPHONE ENCOUNTER
S/w pt, questioned ok to s/w his wife, levi? Per medical communication consent - no information to be given. Per pt, ok to s/w levi. Per Levi, the pt is taking gabapentin 400 mg bid to help with his pain however, in the hospital it was given only once a day. Levi explained several changes to the pt's gabapentin s/t pain and side effects. Per levi, the pt is having significant pain s/t infection in his spine and chronic back pain however none of his doctors will give him anything to address his pain. Per levi, the pt was given morphine and oxycodone in the hospital and that was the only thing that helped his pain. Advised Levi because there have been significant changes in the pt's medications and health, the pt will need to come into the office to discuss this - changes to the pt's medication are not likely to be made over the phone. Advised Levi to fu in the office as scheduled on 3/4. This office will cb sooner if there is a cancellation. The writer will d/w Dr. Barrera and cb if there is anything different tor trinh Joe verbalized understanding

## 2024-02-27 NOTE — TELEPHONE ENCOUNTER
Caller: Tatyana (Wife)     Doctor:     Reason for call: Patient coming in on Monday 3/4/24. Patient in increase pain and is still taking gabapentin twice day (if take more than two it makes him drowsy and weak) . The gabapentin is not helping with pain and asking what he can do till see on Monday.     Please advise     Call back#: [phone]

## 2024-02-28 ENCOUNTER — HOME CARE VISIT (OUTPATIENT)
Dept: HOME HEALTH SERVICES | Facility: HOME HEALTHCARE | Age: 69
End: 2024-02-28
Payer: COMMERCIAL

## 2024-02-28 VITALS
DIASTOLIC BLOOD PRESSURE: 70 MMHG | SYSTOLIC BLOOD PRESSURE: 132 MMHG | HEART RATE: 75 BPM | OXYGEN SATURATION: 98 % | RESPIRATION RATE: 16 BRPM

## 2024-02-28 PROCEDURE — G0299 HHS/HOSPICE OF RN EA 15 MIN: HCPCS

## 2024-03-01 ENCOUNTER — HOME CARE VISIT (OUTPATIENT)
Dept: HOME HEALTH SERVICES | Facility: HOME HEALTHCARE | Age: 69
End: 2024-03-01
Payer: COMMERCIAL

## 2024-03-04 ENCOUNTER — APPOINTMENT (OUTPATIENT)
Dept: LAB | Facility: CLINIC | Age: 69
End: 2024-03-04
Payer: COMMERCIAL

## 2024-03-04 ENCOUNTER — OFFICE VISIT (OUTPATIENT)
Dept: PAIN MEDICINE | Facility: CLINIC | Age: 69
End: 2024-03-04
Payer: COMMERCIAL

## 2024-03-04 ENCOUNTER — HOME CARE VISIT (OUTPATIENT)
Dept: HOME HEALTH SERVICES | Facility: HOME HEALTHCARE | Age: 69
End: 2024-03-04
Payer: COMMERCIAL

## 2024-03-04 VITALS
TEMPERATURE: 98.3 F | SYSTOLIC BLOOD PRESSURE: 125 MMHG | BODY MASS INDEX: 25.62 KG/M2 | DIASTOLIC BLOOD PRESSURE: 70 MMHG | WEIGHT: 179 LBS | HEIGHT: 70 IN

## 2024-03-04 VITALS
DIASTOLIC BLOOD PRESSURE: 70 MMHG | RESPIRATION RATE: 17 BRPM | SYSTOLIC BLOOD PRESSURE: 138 MMHG | OXYGEN SATURATION: 97 % | HEART RATE: 90 BPM

## 2024-03-04 DIAGNOSIS — M54.16 LUMBAR RADICULOPATHY: ICD-10-CM

## 2024-03-04 DIAGNOSIS — M96.1 POSTLAMINECTOMY SYNDROME, LUMBAR: Primary | ICD-10-CM

## 2024-03-04 DIAGNOSIS — M46.47 DISCITIS OF LUMBOSACRAL REGION: ICD-10-CM

## 2024-03-04 DIAGNOSIS — Z98.1 S/P LUMBAR FUSION: ICD-10-CM

## 2024-03-04 LAB
ALBUMIN SERPL BCP-MCNC: 3.9 G/DL (ref 3.5–5)
ALP SERPL-CCNC: 80 U/L (ref 34–104)
ALT SERPL W P-5'-P-CCNC: 12 U/L (ref 7–52)
ANION GAP SERPL CALCULATED.3IONS-SCNC: 12 MMOL/L
ANISOCYTOSIS BLD QL SMEAR: PRESENT
AST SERPL W P-5'-P-CCNC: 18 U/L (ref 13–39)
BASOPHILS # BLD MANUAL: 0 THOUSAND/UL (ref 0–0.1)
BASOPHILS NFR MAR MANUAL: 0 % (ref 0–1)
BILIRUB SERPL-MCNC: 0.44 MG/DL (ref 0.2–1)
BUN SERPL-MCNC: 19 MG/DL (ref 5–25)
CALCIUM SERPL-MCNC: 9.2 MG/DL (ref 8.4–10.2)
CHLORIDE SERPL-SCNC: 99 MMOL/L (ref 96–108)
CO2 SERPL-SCNC: 24 MMOL/L (ref 21–32)
CREAT SERPL-MCNC: 0.92 MG/DL (ref 0.6–1.3)
EOSINOPHIL # BLD MANUAL: 0 THOUSAND/UL (ref 0–0.4)
EOSINOPHIL NFR BLD MANUAL: 0 % (ref 0–6)
ERYTHROCYTE [DISTWIDTH] IN BLOOD BY AUTOMATED COUNT: 17.1 % (ref 11.6–15.1)
GFR SERPL CREATININE-BSD FRML MDRD: 86 ML/MIN/1.73SQ M
GLUCOSE SERPL-MCNC: 291 MG/DL (ref 65–140)
HCT VFR BLD AUTO: 39.6 % (ref 36.5–49.3)
HGB BLD-MCNC: 11.9 G/DL (ref 12–17)
LYMPHOCYTES # BLD AUTO: 0.92 THOUSAND/UL (ref 0.6–4.47)
LYMPHOCYTES # BLD AUTO: 4 % (ref 14–44)
MCH RBC QN AUTO: 25.9 PG (ref 26.8–34.3)
MCHC RBC AUTO-ENTMCNC: 30.1 G/DL (ref 31.4–37.4)
MCV RBC AUTO: 86 FL (ref 82–98)
METAMYELOCYTES NFR BLD MANUAL: 1 % (ref 0–1)
MONOCYTES # BLD AUTO: 0.46 THOUSAND/UL (ref 0–1.22)
MONOCYTES NFR BLD: 4 % (ref 4–12)
NEUTROPHILS # BLD MANUAL: 10 THOUSAND/UL (ref 1.85–7.62)
NEUTS BAND NFR BLD MANUAL: 26 % (ref 0–8)
NEUTS SEG NFR BLD AUTO: 61 % (ref 43–75)
OVALOCYTES BLD QL SMEAR: PRESENT
PLATELET # BLD AUTO: 352 THOUSANDS/UL (ref 149–390)
PLATELET BLD QL SMEAR: ADEQUATE
PMV BLD AUTO: 10.7 FL (ref 8.9–12.7)
POIKILOCYTOSIS BLD QL SMEAR: PRESENT
POLYCHROMASIA BLD QL SMEAR: PRESENT
POTASSIUM SERPL-SCNC: 3.7 MMOL/L (ref 3.5–5.3)
PROT SERPL-MCNC: 6.7 G/DL (ref 6.4–8.4)
RBC # BLD AUTO: 4.6 MILLION/UL (ref 3.88–5.62)
RBC MORPH BLD: PRESENT
SODIUM SERPL-SCNC: 135 MMOL/L (ref 135–147)
VARIANT LYMPHS # BLD AUTO: 4 %
WBC # BLD AUTO: 11.49 THOUSAND/UL (ref 4.31–10.16)

## 2024-03-04 PROCEDURE — 80053 COMPREHEN METABOLIC PANEL: CPT

## 2024-03-04 PROCEDURE — 36415 COLL VENOUS BLD VENIPUNCTURE: CPT

## 2024-03-04 PROCEDURE — 85027 COMPLETE CBC AUTOMATED: CPT

## 2024-03-04 PROCEDURE — G0299 HHS/HOSPICE OF RN EA 15 MIN: HCPCS

## 2024-03-04 PROCEDURE — 85007 BL SMEAR W/DIFF WBC COUNT: CPT

## 2024-03-04 PROCEDURE — 99214 OFFICE O/P EST MOD 30 MIN: CPT | Performed by: NURSE PRACTITIONER

## 2024-03-04 RX ORDER — CELECOXIB 100 MG/1
100 CAPSULE ORAL 2 TIMES DAILY
Qty: 60 CAPSULE | Refills: 2 | Status: SHIPPED | OUTPATIENT
Start: 2024-03-04 | End: 2024-03-04 | Stop reason: SDUPTHER

## 2024-03-04 RX ORDER — CELECOXIB 100 MG/1
100 CAPSULE ORAL 2 TIMES DAILY
Qty: 60 CAPSULE | Refills: 3 | Status: SHIPPED | OUTPATIENT
Start: 2024-03-04

## 2024-03-04 RX ORDER — GABAPENTIN 400 MG/1
400 CAPSULE ORAL 2 TIMES DAILY
Qty: 60 CAPSULE | Refills: 5 | Status: SHIPPED | OUTPATIENT
Start: 2024-03-04

## 2024-03-04 NOTE — PROGRESS NOTES
Assessment:  1. Postlaminectomy syndrome, lumbar    2. S/P lumbar fusion    3. Lumbar radiculopathy        Plan:  The patient is a 68 y.o. male last seen on 10/02/2023 who presents for a follow up office visit in regards to chronic pain secondary to low back pain, lumbar postlaminectomy syndrome-fusion T12-S1 April 1, 2023, lumbar foraminal stenosis and lumbar radiculopathy.  Patient presents today with ongoing low back and bilateral leg pain.  Since the last office visit he was diagnosed with discitis and osteomyelitis at L5-S1.  He had underwent IV antibiotic treatment.  He is followed by I&D.  Patient biopsies were negative.  He continues with a moderate amount of low back and bilateral leg pain.  Unfortunately I cannot increase the gabapentin more than 400 mg twice a day because it caused side effects in the past.  He also has an allergy to Lyrica.  He was on Cymbalta in the past but is now taking Zoloft.  He uses medical marijuana.  He also has a history of alcohol cirrhosis.  At this time I explained to the patient that I do not have many options to treat his pain.  He would not be a candidate for opioids due to his medical marijuana use and history of alcoholism.  I will start him on Celebrex 100 mg 1 tablet twice a day as needed for the pain.  Side effects include GI irritation so instructed him to take the medication with food.  A prescription was sent to the pharmacy. (GFR 91)    He will also continue on gabapentin 400 mg 1 tablet twice a day and refills were sent to the pharmacy.    I also explained to the patient he would not be a candidate for spinal cord stimulation or injections at that time due to his recent discitis and osteomyelitis    The patient will follow-up in 4 months for medication prescription refill and reevaluation. The patient was advised to contact the office should their symptoms worsen in the interim. The patient was agreeable and verbalized an understanding.        History of Present  Illness:    The patient is a 68 y.o. male last seen on 10/02/2023 who presents for a follow up office visit in regards to chronic pain secondary to low back pain, lumbar postlaminectomy syndrome-fusion T12-S1 April 1, 2023, lumbar foraminal stenosis and lumbar radiculopathy.  Patient's last office visit was October 2, 2023 in which she was prescribed a Medrol Dosepak and the gabapentin was increased to 400 mg today.  The patient presents today with ongoing low back pain.  The pain is worse since the last office visit.  The patient was admitted to the hospital on January 24, 2024 for osteomyelitis and discitis at L5-S1.  He was followed by I&D and treated with IV vancomycin. he will be completing antibiotic treatment on March 8, 2024.  He has been followed by neurosurgery.  He was last seen February 23, 2024, in which he was referred back to our office to manage his pain.  The pain also radiates down both legs.  It is constant and described as burning, dull aching, throbbing, cramping, pressure-like, shooting, numbness, and pins-and-needles.  He is rating his pain a 10/10 on the numeric rating scale    Current medications include gabapentin 400 mg 1 tablet 2 times a day.  He also uses medical marijuana, methocarbamol 750 mg 1 tablet twice a day which is prescribed by PCP    Patient had a spinal cord stimulator trial in 1/26/23, which provided minimal back pain relief, but did help his legs pain. Spinal cord stimulation was also discussed at the last office visit, but he would need to have an open trial with neurosurgery due to his fusion starting at T12.     I have personally reviewed and/or updated the patient's past medical history, past surgical history, family history, social history, current medications, allergies, and vital signs today.       Review of Systems:    Review of Systems   Respiratory:  Negative for shortness of breath.    Cardiovascular:  Negative for chest pain.   Gastrointestinal:  Positive for  nausea. Negative for constipation, diarrhea and vomiting.   Musculoskeletal:  Positive for back pain, gait problem and joint swelling (joint stiffness). Negative for arthralgias and myalgias.        Decreased ROM  B/L LE Pain   Skin:  Positive for rash.   Neurological:  Positive for weakness. Negative for dizziness and seizures.   All other systems reviewed and are negative.        Past Medical History:   Diagnosis Date    Anxiety     Arthritis     Cancer (HCC)     Depression     Diabetes mellitus (HCC)     Hypertension     Liver disease     Mitral valve prolapse     Peripheral neuropathy     Neuropathy    PONV (postoperative nausea and vomiting)     Thyroid disease        Past Surgical History:   Procedure Laterality Date    COLONOSCOPY      INCISION AND DRAINAGE OF WOUND Left 2022    Procedure: INCISION AND DRAINAGE (I&D) EXTREMITY;  Surgeon: Moustapha Cote DPM;  Location:  MAIN OR;  Service: Podiatry    IR BIOPSY SPINE  2024    IR BIOPSY SPINE  2024    IR PICC PLACEMENT SINGLE LUMEN  2024    JOINT REPLACEMENT Left 2022    Left TSA    MD ARTHRODESIS POSTERIOR/PSTLAT TQ 1NTRSPC LUMBAR Bilateral 2023    Procedure: L1-S1 navigated posterior decompression with instrumented fixation fusion;  Surgeon: James Moraes MD;  Location:  MAIN OR;  Service: Neurosurgery    THYROID SURGERY      remove cancer       Family History   Problem Relation Age of Onset    No Known Problems Father     No Known Problems Mother     Colon cancer Neg Hx        Social History     Occupational History    Not on file   Tobacco Use    Smoking status: Former     Current packs/day: 0.00     Average packs/day: 0.3 packs/day for 5.9 years (1.5 ttl pk-yrs)     Types: Cigarettes     Start date: 1975     Quit date: 1981     Years since quittin.7    Smokeless tobacco: Never    Tobacco comments:     quit    Vaping Use    Vaping status: Some Days    Substances: CBD   Substance and Sexual Activity     Alcohol use: Yes     Alcohol/week: 4.0 - 7.0 standard drinks of alcohol     Types: 1 - 2 Glasses of wine, 2 - 3 Cans of beer, 1 - 2 Shots of liquor per week     Comment: only on special occasions    Drug use: Yes     Frequency: 7.0 times per week     Types: Marijuana     Comment: Medical Marijuana    Sexual activity: Yes     Partners: Female     Birth control/protection: None         Current Outpatient Medications:     ACCU-CHEK FABIANO PLUS test strip, 4 (four) times a day, Disp: , Rfl: 1    ACCU-CHEK FASTCLIX LANCETS MISC, 4 (four) times a day, Disp: , Rfl: 1    acetaminophen (TYLENOL) 500 mg tablet, Take 1 tablet (500 mg total) by mouth every 6 (six) hours as needed for mild pain 500 mg tablet, Disp: , Rfl: 0    cefepime (MAXIPIME) 2 g injection, Inject 2 g into a catheter in a vein 2 (two) times a day. 2oml IVP over 3-5 minutes   Indications: Bacteria in the Blood, Disp: , Rfl:     celecoxib (CeleBREX) 100 mg capsule, Take 1 capsule (100 mg total) by mouth 2 (two) times a day Take with food, Disp: 60 capsule, Rfl: 3    CHOLECALCIFEROL PO, Take 5,000 Units by mouth daily, Disp: , Rfl:     clonazePAM (KlonoPIN) 1 mg tablet, Take 1 mg by mouth 2 (two) times a day & 3rd pill PRN, Disp: , Rfl:     finasteride (PROSCAR) 5 mg tablet, Take 1 tablet (5 mg total) by mouth daily, Disp: 90 tablet, Rfl: 3    fluticasone (FLONASE) 50 mcg/act nasal spray, 1 spray into each nostril daily as needed, Disp: , Rfl:     folic acid (FOLVITE) 1 mg tablet, Take 2,000 mcg by mouth daily, Disp: , Rfl: 6    gabapentin (NEURONTIN) 400 mg capsule, Take 1 capsule (400 mg total) by mouth 2 (two) times a day, Disp: 60 capsule, Rfl: 5    Jardiance 10 MG TABS, Take 10 mg by mouth every morning, Disp: , Rfl:     ketoconazole (NIZORAL) 2 % shampoo, Apply 1 application. topically daily Use as directed, Disp: , Rfl: 6    lidocaine (LIDODERM) 5 %, Apply 1 patch topically over 12 hours daily Remove & Discard patch within 12 hours or as directed by MD,  Disp: 15 patch, Rfl: 0    losartan-hydrochlorothiazide (HYZAAR) 100-25 MG per tablet, Take 1 tablet by mouth every morning, Disp: , Rfl: 0    lovastatin (MEVACOR) 20 mg tablet, Take 20 mg by mouth every morning, Disp: , Rfl: 3    melatonin 3 mg, Take 1 tablet (3 mg total) by mouth daily at bedtime, Disp: 30 tablet, Rfl: 0    metFORMIN (GLUCOPHAGE) 1000 MG tablet, Take 1,000 mg by mouth 2 (two) times a day with meals , Disp: , Rfl:     methocarbamol (ROBAXIN) 750 mg tablet, Take 1 tablet (750 mg total) by mouth every 8 (eight) hours as needed for muscle spasms, Disp: 15 tablet, Rfl: 0    METOPROLOL SUCCINATE ER PO, Take 75 mg by mouth every morning, Disp: , Rfl:     mirtazapine (REMERON) 45 MG tablet, Take 45 mg by mouth daily at bedtime, Disp: , Rfl: 1    NIFEdipine (PROCARDIA XL) 90 mg 24 hr tablet, Take 120 mg by mouth every morning Takes 90 mg and 30 mg =120 mg every AM, Disp: , Rfl: 3    senna (SENOKOT) 8.6 mg, Take 1 tablet (8.6 mg total) by mouth 2 (two) times a day, Disp: 60 tablet, Rfl: 0    sertraline (ZOLOFT) 100 mg tablet, Take 100 mg by mouth every morning, Disp: , Rfl:     sodium chloride (Monoject Sodium Chloride Flush) 0.9 % SOLN, Inject 10 mL into a catheter in a vein 2 (two) times a day. Use before and after IV antibiotic administration   Indications: FLUSHING, Disp: , Rfl:     tamsulosin (FLOMAX) 0.4 mg, Take 1 capsule (0.4 mg total) by mouth daily with dinner, Disp: 90 capsule, Rfl: 3    vancomycin (VANCOCIN) 1 g, Inject 1 g into a catheter in a vein 2 (two) times a day. 100ml  easy pump    Administer over 1 hour  Indications: Infection caused by Bacteria, Disp: , Rfl:     gabapentin (NEURONTIN) 400 mg capsule, Take 1 capsule (400 mg total) by mouth daily at bedtime, Disp: 30 capsule, Rfl: 0    GNP Aspirin Low Dose 81 MG EC tablet, Take 1 tablet (81 mg total) by mouth daily Do not start before April 25, 2023., Disp: , Rfl: 0    Heparin Na, Pork, Lock Flsh PF 10 units/mL, 10 Units by Intracatheter  "route 2 (two) times a day. Flush PICC 2x daily after IV antibiotic administration  Indications: Prevention of Closure of Indwelling Catheter, Disp: , Rfl:     Allergies   Allergen Reactions    Abilify [Aripiprazole] Tremor     Shaking      Cephalexin Diarrhea    Molds & Smuts Allergic Rhinitis    Pregabalin Tremor     Lyrica - shaking feeling       Physical Exam:    /70 (BP Location: Left arm, Patient Position: Sitting, Cuff Size: Adult)   Temp 98.3 °F (36.8 °C)   Ht 5' 10\" (1.778 m)   Wt 81.2 kg (179 lb)   BMI 25.68 kg/m²     Constitutional:normal, well developed, well nourished, alert, in no distress and non-toxic and no overt pain behavior.  Eyes:anicteric  HEENT:grossly intact  Neck:supple, symmetric, trachea midline and no masses   Pulmonary:even and unlabored  Cardiovascular:No edema or pitting edema present  Skin:Normal without rashes or lesions and well hydrated  Psychiatric:Mood and affect appropriate  Neurologic:Cranial Nerves II-XII grossly intact  Musculoskeletal: ambulates with walker      Imaging    MRI LUMBAR SPINE WITH AND WITHOUT CONTRAST     INDICATION: lumbar spine with and without contrast to evaluate for diskitis/osteomyelitis.     COMPARISON: Same day CT lumbar spine, and MRI lumbar spine 12/2/2022     TECHNIQUE:  Multiplanar, multisequence imaging of the lumbar spine was performed before and after gadolinium administration. .        IV Contrast:  8 mL of Gadobutrol injection (SINGLE-DOSE)     IMAGE QUALITY:  Diagnostic     FINDINGS:     VERTEBRAL BODIES:  There are 5 lumbar type vertebral bodies. Redemonstrated postsurgical changes status post decompressive laminectomy at L1-L5 with posterior instrumented fusion spanning T12-S1.     There is retrolisthesis at L5-S1. Minimal anterolisthesis at L3-4.     Evidence of acute or subacute discitis/osteomyelitis at level L5-S1 with discal fluid/edema, opposing endplate irregularity with edema and enhancement. There is ventral epidural " phlegmonous change without abscess. There is lateral and anterior   paravertebral phlegmonous change without drainable abscess.     There is disc edema and enhancement in the left level L4-5 with mild opposing endplate enhancement compatible with acute/subacute discitis/osteomyelitis. There is minimal inflammation in the left ventral epidural space. No abscess.     There is disc and endplate edema with enhancement at the level L1-2 consistent with acute or subacute discitis/osteomyelitis. There is no significant epidural phlegmon or abscess. There is mild lateral and anterior paravertebral inflammation/phlegmon   without apparent abscess.     There is acute discitis at level L2-3 with edematous and enhancing disc. There is small component of osteomyelitis and subjacent endplate. There is paravertebral soft tissue phlegmon in the corresponding level.     SACRUM: See above.     Fatty marrow replacing foci in bilateral iliac bones are similar to prior MRI 12/2/2022.     DISTAL CORD AND CONUS:  Normal size and signal within the distal cord and conus.     PARASPINAL SOFT TISSUES: See above     LOWER THORACIC DISC SPACES: Unremarkable     LUMBAR DISC SPACES:     L1-L2: Changes of discitis/osteomyelitis as described above. Posterior decompression and instrumented fusion. No disc bulge. Surgically spacious canal. Limited assessment of the foramen due to artifact from surgical hardware. No high-grade foraminal   stenosis is appreciated.     L2-L3: Changes of discitis and minimal osteomyelitis as described above. Posterior decompression and instrumented fusion. There is minimal left central disc protrusion. No significant foraminal narrowing.     L3-L4: Posterior decompression and instrumented fusion. Minimal anterolisthesis. Mild disc bulge. No canal stenosis. Mild right foraminal narrowing.     L4-L5: Changes of discitis/osteomyelitis as described above. Posterior decompression and instrumented fusion. Disc bulge and  marginal endplate osteophytes. Surgically spacious canal. Mild left foraminal narrowing.     L5-S1: Changes of discitis/osteomyelitis as described above. Prior posterior instrumented fusion. There is combination of disc bulge and phlegmonous change in conjunction with facet arthropathy resulting in moderate canal narrowing and moderate to severe   bilateral foraminal stenosis.        OTHER FINDINGS: Partially imaged significantly distended urinary bladder.     Redemonstrated small right kidney lower pole cortical cyst.     IMPRESSION:     1.  Redemonstrated postsurgical changes status post L1-L5 laminectomies with posterior instrumented fusion spanning T12-S1. Evidence of bilateral S1 pedicle screw loosening, better demonstrated in same-day CT.  2.  Multilevel acute or subacute discitis/osteomyelitis involving postsurgical level L5-S1 and to lesser degree levels L1-2 and L4-5. There is acute or subacute discitis at level L2-3 with minimal subjacent endplate osteomyelitis.  3.  Epidural phlegmonous change pronounced at level L5-S1 without abscess. There is moderate canal stenosis at this level.  4.  Mild ventral epidural phlegmon at level L1-2. Paravertebral inflammation/phlegmonous change in the infected levels without apparent/drainable abscess.  5.  Degenerative change without high-grade canal or foraminal stenosis as detailed.           The study was marked in EPIC for immediate notification.  No orders to display         No orders of the defined types were placed in this encounter.

## 2024-03-05 ENCOUNTER — HOME CARE VISIT (OUTPATIENT)
Dept: HOME HEALTH SERVICES | Facility: HOME HEALTHCARE | Age: 69
End: 2024-03-05
Payer: COMMERCIAL

## 2024-03-05 VITALS
RESPIRATION RATE: 16 BRPM | OXYGEN SATURATION: 98 % | SYSTOLIC BLOOD PRESSURE: 138 MMHG | HEART RATE: 72 BPM | DIASTOLIC BLOOD PRESSURE: 72 MMHG

## 2024-03-05 PROCEDURE — G0299 HHS/HOSPICE OF RN EA 15 MIN: HCPCS

## 2024-03-11 ENCOUNTER — TELEPHONE (OUTPATIENT)
Dept: NEUROSURGERY | Facility: CLINIC | Age: 69
End: 2024-03-11

## 2024-03-11 NOTE — TELEPHONE ENCOUNTER
Received a call from Tatyana stating Juan is having uncontrolled pain and requesting assistance with pain medication.     Returned her call to advise that requests for medications to treat pain should be deferred to PCP or PM, unfortunately we can not prescribe. Left a detailed message stating above and encouraging a call back to determine if he has met with ID as it appears the fup is to happen after this. Suggested perhaps the visit can be moved up depending on that ID visit.

## 2024-03-20 ENCOUNTER — TELEPHONE (OUTPATIENT)
Dept: PAIN MEDICINE | Facility: CLINIC | Age: 69
End: 2024-03-20

## 2024-03-20 NOTE — TELEPHONE ENCOUNTER
Caller: Tatyana     Doctor: Marisel    Reason for call: patient was admitted on Monday 3/11 he was released on 3/14 they sent him home with Tramadol 50 mg every 6 hours, he f/u with primary and they told him to f/u with pain mgt for refills. He has 2 days left medication is working, no side effects. Its gives him relief.     Please advise     Call back#: [phone]

## 2024-03-20 NOTE — TELEPHONE ENCOUNTER
[facility] Discharge Summary obtained and scanned into Media    3/12/24 [facility] MRI lumbar spine scanned in Media as well

## 2024-03-20 NOTE — TELEPHONE ENCOUNTER
S/w pt and obtained verbal consent to s/w pt's wife    Attempted to contact wife Tatyana. [facility] [place] [place]. Provided with cb# and [place].

## 2024-03-20 NOTE — TELEPHONE ENCOUNTER
Can pt's discharge summary be obtained from [facility] and scanned in to media please?

## 2024-03-20 NOTE — TELEPHONE ENCOUNTER
Telephone call from patient that he would like to have a refill on his Tramadol because it really helped him and he would like to add his wife to his communication consent. I let him know he would have to fill out a new one and add her. I told him I would send a message over to the office about refilling his tramadol and having it sent to [facility] of [address] [phone]

## 2024-03-20 NOTE — TELEPHONE ENCOUNTER
"Attempted to contact pt. S/w pt's wife Tatyana, who advised that the pt was recently in [facility] and dc to home with Tramadol. Followed up with the pcp who advised pt to fu with pain management re: tramadol refills. Requested the pt cb to discuss. Provided cb number and office hours. Tatyana verbalized understanding and appreciation.     Per medical communication consent on file, \"no one gets info\".   "

## 2024-03-20 NOTE — TELEPHONE ENCOUNTER
S/w pt's wife Tatyana. [facility] scheduled to discuss med mgt post discharge from hospital.       Pt request tramadol refill. Advised by PCP to contact [facility].

## 2024-03-21 ENCOUNTER — TELEPHONE (OUTPATIENT)
Dept: PAIN MEDICINE | Facility: CLINIC | Age: 69
End: 2024-03-21

## 2024-03-21 NOTE — TELEPHONE ENCOUNTER
Please call patient before appointment today.    As we previously discussed at the last ov,  I will not prescribe him opioids.  Tramadol is an opioid. He can ask his PCP if they feel comfortable prescribing or we can give him a list of other pain providers in the area    Not an opioid candidate (uses THC, history ETOH abuse, and on Clonazepam 1 mg 2-3 times a day )

## 2024-03-21 NOTE — TELEPHONE ENCOUNTER
Please contact patient today before his appointment. Please let him know that we will not be taking over his tramadol.  He can ask his PCP if they feel comfortable prescribing, or we can send him a list of other pain providers in the area.    At the last ov I did tell him, our office would not prescribe any opioids

## 2024-03-21 NOTE — TELEPHONE ENCOUNTER
S/w pt's wife, Tatyana. Advised of above. Tatyana expressed frustration. Stated that she and the pt do not want to keep the ov scheduled for today in light of this telephone call. Requested a list of other area pain providers be emailed. Advised Tatyana. If the information cannot be emailed, it will be mailed to the home address. Tatyana verbalized understanding.     3/21/24 ov cancelled.

## 2024-03-29 ENCOUNTER — HOME HEALTH ADMISSION (OUTPATIENT)
Dept: HOME HEALTH SERVICES | Facility: HOME HEALTHCARE | Age: 69
End: 2024-03-29
Payer: COMMERCIAL

## 2024-03-29 ENCOUNTER — HOME CARE VISIT (OUTPATIENT)
Dept: HOME HEALTH SERVICES | Facility: HOME HEALTHCARE | Age: 69
End: 2024-03-29

## 2024-04-01 ENCOUNTER — HOME CARE VISIT (OUTPATIENT)
Dept: HOME HEALTH SERVICES | Facility: HOME HEALTHCARE | Age: 69
End: 2024-04-01
Payer: COMMERCIAL

## 2024-04-01 VITALS — HEART RATE: 92 BPM | OXYGEN SATURATION: 98 % | SYSTOLIC BLOOD PRESSURE: 138 MMHG | DIASTOLIC BLOOD PRESSURE: 70 MMHG

## 2024-04-01 PROCEDURE — G0151 HHCP-SERV OF PT,EA 15 MIN: HCPCS

## 2024-04-01 PROCEDURE — 400013 VN SOC

## 2024-04-03 ENCOUNTER — HOME CARE VISIT (OUTPATIENT)
Dept: HOME HEALTH SERVICES | Facility: HOME HEALTHCARE | Age: 69
End: 2024-04-03
Payer: COMMERCIAL

## 2024-04-04 ENCOUNTER — HOME CARE VISIT (OUTPATIENT)
Dept: HOME HEALTH SERVICES | Facility: HOME HEALTHCARE | Age: 69
End: 2024-04-04
Payer: COMMERCIAL

## 2024-04-04 PROCEDURE — G0157 HHC PT ASSISTANT EA 15: HCPCS

## 2024-04-08 ENCOUNTER — TELEPHONE (OUTPATIENT)
Dept: NEUROSURGERY | Facility: CLINIC | Age: 69
End: 2024-04-08

## 2024-04-08 NOTE — TELEPHONE ENCOUNTER
LEFT VM AS A SECOND REMINDER TO GET LUMBAR XR, AS WELL AS TO INQUIRE IF PATIENT HAS SEEN I.D.. WE WILL NEED RECORDS.    OFFICE AND FAX NUMBER PROVIDED IN .

## 2024-04-09 ENCOUNTER — HOME CARE VISIT (OUTPATIENT)
Dept: HOME HEALTH SERVICES | Facility: HOME HEALTHCARE | Age: 69
End: 2024-04-09
Payer: COMMERCIAL

## 2024-04-09 ENCOUNTER — TELEPHONE (OUTPATIENT)
Dept: NEUROSURGERY | Facility: CLINIC | Age: 69
End: 2024-04-09

## 2024-04-09 PROCEDURE — G0151 HHCP-SERV OF PT,EA 15 MIN: HCPCS

## 2024-04-09 NOTE — TELEPHONE ENCOUNTER
Pt wife left VM to cancel 4/9/24 appt with KT, left return VM to return our call  so we could reschedule

## 2024-04-12 ENCOUNTER — HOME CARE VISIT (OUTPATIENT)
Dept: HOME HEALTH SERVICES | Facility: HOME HEALTHCARE | Age: 69
End: 2024-04-12
Payer: COMMERCIAL

## 2024-04-12 PROCEDURE — G0151 HHCP-SERV OF PT,EA 15 MIN: HCPCS

## 2024-04-15 ENCOUNTER — HOME CARE VISIT (OUTPATIENT)
Dept: HOME HEALTH SERVICES | Facility: HOME HEALTHCARE | Age: 69
End: 2024-04-15
Payer: COMMERCIAL

## 2024-04-17 ENCOUNTER — HOME CARE VISIT (OUTPATIENT)
Dept: HOME HEALTH SERVICES | Facility: HOME HEALTHCARE | Age: 69
End: 2024-04-17
Payer: COMMERCIAL

## 2024-04-17 VITALS — DIASTOLIC BLOOD PRESSURE: 78 MMHG | OXYGEN SATURATION: 98 % | HEART RATE: 73 BPM | SYSTOLIC BLOOD PRESSURE: 140 MMHG

## 2024-04-17 PROCEDURE — G0151 HHCP-SERV OF PT,EA 15 MIN: HCPCS

## 2024-05-09 ENCOUNTER — HOSPITAL ENCOUNTER (EMERGENCY)
Facility: HOSPITAL | Age: 69
Discharge: HOME | End: 2024-05-09
Attending: EMERGENCY MEDICINE
Payer: COMMERCIAL

## 2024-05-09 ENCOUNTER — APPOINTMENT (OUTPATIENT)
Dept: RADIOLOGY | Facility: HOSPITAL | Age: 69
End: 2024-05-09
Payer: COMMERCIAL

## 2024-05-09 VITALS
BODY MASS INDEX: 27.49 KG/M2 | TEMPERATURE: 98 F | WEIGHT: 192 LBS | DIASTOLIC BLOOD PRESSURE: 61 MMHG | HEIGHT: 70 IN | SYSTOLIC BLOOD PRESSURE: 126 MMHG | OXYGEN SATURATION: 97 % | RESPIRATION RATE: 16 BRPM | HEART RATE: 74 BPM

## 2024-05-09 DIAGNOSIS — M79.643 HAND PAIN: ICD-10-CM
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
## 2024-05-09 PROCEDURE — 99283 EMERGENCY DEPT VISIT LOW MDM: CPT

## 2024-05-09 PROCEDURE — 99284 EMERGENCY DEPT VISIT MOD MDM: CPT

## 2024-05-09 PROCEDURE — 73130 X-RAY EXAM OF HAND: CPT

## 2024-05-10 ENCOUNTER — TELEPHONE (OUTPATIENT)
Age: 69
End: 2024-05-10

## 2024-05-10 NOTE — TELEPHONE ENCOUNTER
Patient is being referred to a orthopedics. Please schedule accordingly.    [facility]   [phone]

## 2024-05-10 NOTE — ED PROVIDER NOTES
History  Chief Complaint   Patient presents with    Fall     5 days ago, hx of balance problems, lost balance and fell onto R hand, denies head strike, takes daily 81mg aspirin, c/o R hand pain and back pain     Patient is a 69 yo M pmhx of chronic back pain arriving for right hand pain. Pat[place] states he had a mechanical fall in his kitchen and caught himself on a can of soup he was holding. Denies striking his head, neck, chest, abdomen, pelvis. Patient states his back pain is at baseline and not new. Patient concerned with right hand pain. Patient had no numbness or tingling in distsal finger. Patient has mild swelling at right second and third MCP. Patient has full ROM of all thumb at CMC, MCP, IP when isolated and in conjunction, no numbness or tingling. Patient's second through fifth digits have full rom of at MCP, PIP, DIP when isolated and when in conjunction with each other. No numbness or tingling. Patient is able to flex and extend all digits.  Patient denies new numbness or tingling in distal extremities. Patient resting comfortably.         Prior to Admission Medications   Prescriptions Last Dose Informant Patient Reported? Taking?   ACCU-CHEK FABIANO PLUS test strip  Self Yes No   Si (four) times a day   ACCU-CHEK FASTCLIX LANCETS MISC  Self Yes No   Si (four) times a day   Azelastine HCl 137 MCG/SPRAY SOLN   Yes No   Si mcg into each nostril 2 (two) times a day as needed (allergies). Indications: Hayfever   CHOLECALCIFEROL PO  Self Yes No   Sig: Take 5,000 Units by mouth daily   GNP Aspirin Low Dose 81 MG EC tablet  Self Yes No   Sig: Take 1 tablet (81 mg total) by mouth daily Do not start before 2023.   Heparin Na, Pork, Lock Flsh PF 10 units/mL  Self Yes No   Sig: 10 Units by Intracatheter route 2 (two) times a day. Flush PICC 2x daily after IV antibiotic administration pt not using  Indications: Prevention of Closure of Indwelling Catheter   Jardiance 10 MG TABS  Self Yes No    Sig: Take 10 mg by mouth every morning   METOPROLOL SUCCINATE ER PO  Self Yes No   Sig: Take 75 mg by mouth every morning   NIFEdipine (PROCARDIA XL) 90 mg 24 hr tablet  Self Yes No   Sig: Take 120 mg by mouth every morning Takes 90 mg and 30 mg =120 mg every AM   acetaminophen (TYLENOL) 500 mg tablet  Self No No   Sig: Take 1 tablet (500 mg total) by mouth every 6 (six) hours as needed for mild pain 500 mg tablet   cefepime (MAXIPIME) 2 g injection  Self Yes No   Sig: Inject 2 g into a catheter in a vein 2 (two) times a day. 2oml IVP over 3-5 minutes  pt not using   Indications: Bacteria in the Blood   celecoxib (CeleBREX) 100 mg capsule   No No   Sig: Take 1 capsule (100 mg total) by mouth 2 (two) times a day Take with food   Patient not taking: Reported on 4/3/2024   cetirizine (ZyrTEC) 10 mg tablet   Yes No   Sig: Take 10 mg by mouth daily. PRN  Indications: Hayfever   clonazePAM (KlonoPIN) 1 mg tablet  Self Yes No   Sig: Take 1 mg by mouth 2 (two) times a day & 3rd pill PRN   finasteride (PROSCAR) 5 mg tablet  Self No No   Sig: Take 1 tablet (5 mg total) by mouth daily   fluticasone (FLONASE) 50 mcg/act nasal spray  Self Yes No   Si spray into each nostril daily as needed   folic acid (FOLVITE) 1 mg tablet  Self Yes No   Sig: Take 2,000 mcg by mouth daily   gabapentin (NEURONTIN) 400 mg capsule   No No   Sig: Take 1 capsule (400 mg total) by mouth daily at bedtime   Patient not taking: Reported on 4/3/2024   gabapentin (NEURONTIN) 400 mg capsule   No No   Sig: Take 1 capsule (400 mg total) by mouth 2 (two) times a day   hydrOXYzine pamoate (VISTARIL) 50 mg capsule   Yes No   Sig: Take 50 mg by mouth 2 (two) times a day. Indications: Feeling Anxious   ketoconazole (NIZORAL) 2 % shampoo  Self Yes No   Sig: Apply 1 application. topically daily Use as directed   lidocaine (LIDODERM) 5 %   No No   Sig: Apply 1 patch topically over 12 hours daily Remove & Discard patch within 12 hours or as directed by MD    loratadine (CLARITIN) 10 mg tablet   Yes No   Sig: Take 10 mg by mouth daily. per med list humberto SALAZAR  Indications: Hayfever   losartan-hydrochlorothiazide (HYZAAR) 100-25 MG per tablet  Self Yes No   Sig: Take 1 tablet by mouth every morning   lovastatin (MEVACOR) 20 mg tablet  Self Yes No   Sig: Take 20 mg by mouth every morning   melatonin 3 mg  Self No No   Sig: Take 1 tablet (3 mg total) by mouth daily at bedtime   metFORMIN (GLUCOPHAGE) 1000 MG tablet  Self Yes No   Sig: Take 1,000 mg by mouth 2 (two) times a day with meals    methocarbamol (ROBAXIN) 750 mg tablet  Self No No   Sig: Take 1 tablet (750 mg total) by mouth every 8 (eight) hours as needed for muscle spasms   Patient taking differently: Take 750 mg by mouth every 6 (six) hours as needed for muscle spasms. Indications: Musculoskeletal Pain   mirtazapine (REMERON) 45 MG tablet  Self Yes No   Sig: Take 45 mg by mouth daily at bedtime pt to take 30mg at bedtime per latest dci    patient supplied medication   Yes No   Sig: medical marijuana as needed per latest dci  Indications: .   senna (SENOKOT) 8.6 mg  Self No No   Sig: Take 1 tablet (8.6 mg total) by mouth 2 (two) times a day   Patient taking differently: Take 8.6 mg by mouth 2 (two) times a day. 2 tabs at bedtime as needed for constipation per latest dci   Indications: Constipation   sertraline (ZOLOFT) 100 mg tablet  Self Yes No   Sig: Take 100 mg by mouth every morning   sodium chloride (Monoject Sodium Chloride Flush) 0.9 % SOLN  Self Yes No   Sig: Inject 10 mL into a catheter in a vein 2 (two) times a day. Use before and after IV antibiotic administration  pt not using   Indications: FLUSHING   tamsulosin (FLOMAX) 0.4 mg  Self No No   Sig: Take 1 capsule (0.4 mg total) by mouth daily with dinner   traMADol (ULTRAM) 50 mg tablet   Yes No   Sig: Take 50 mg by mouth every 6 (six) hours as needed for moderate pain. Indications: Pain   vancomycin (VANCOCIN) 1 g  Self Yes No   Sig: Inject 1 g  into a catheter in a vein 2 (two) times a day. 100ml  easy pump    Administer over 1 hour  pt not using  Indications: Infection caused by Bacteria      Facility-Administered Medications: None       Past Medical History:   Diagnosis Date    Anxiety     Arthritis     Cancer (HCC)     Depression     Diabetes mellitus (HCC)     Hypertension     Liver disease     Mitral valve prolapse     Peripheral neuropathy     Neuropathy    PONV (postoperative nausea and vomiting)     Thyroid disease        Past Surgical History:   Procedure Laterality Date    COLONOSCOPY      INCISION AND DRAINAGE OF WOUND Left 2022    Procedure: INCISION AND DRAINAGE (I&D) EXTREMITY;  Surgeon: Moustapha Cote DPM;  Location:  MAIN OR;  Service: Podiatry    IR BIOPSY SPINE  2024    IR BIOPSY SPINE  2024    IR PICC PLACEMENT SINGLE LUMEN  2024    JOINT REPLACEMENT Left 2022    Left TSA    RI ARTHRODESIS POSTERIOR/PSTLAT TQ 1NTRSPC LUMBAR Bilateral 2023    Procedure: L1-S1 navigated posterior decompression with instrumented fixation fusion;  Surgeon: James Moraes MD;  Location:  MAIN OR;  Service: Neurosurgery    THYROID SURGERY      remove cancer       Family History   Problem Relation Age of Onset    No Known Problems Father     No Known Problems Mother     Colon cancer Neg Hx      I have reviewed and agree with the history as documented.    E-Cigarette/Vaping    E-Cigarette Use Current Some Day User     Comments medical marijuana - occ      E-Cigarette/Vaping Substances    Nicotine No     THC No     CBD Yes     Flavoring No     Other No     Unknown No      Social History     Tobacco Use    Smoking status: Former     Current packs/day: 0.00     Average packs/day: 0.3 packs/day for 5.9 years (1.5 ttl pk-yrs)     Types: Cigarettes     Start date: 1975     Quit date: 1981     Years since quittin.9    Smokeless tobacco: Never    Tobacco comments:     quit    Vaping Use    Vaping status: Some Days     Substances: CBD   Substance Use Topics    Alcohol use: Yes     Alcohol/week: 4.0 - 7.0 standard drinks of alcohol     Types: 1 - 2 Glasses of wine, 2 - 3 Cans of beer, 1 - 2 Shots of liquor per week     Comment: only on special occasions    Drug use: Yes     Frequency: 7.0 times per week     Types: Marijuana     Comment: Medical Marijuana       Review of Systems   Constitutional: Negative.    HENT: Negative.     Eyes: Negative.    Respiratory: Negative.     Cardiovascular: Negative.    Gastrointestinal: Negative.    Endocrine: Negative.    Genitourinary: Negative.    Musculoskeletal:  Positive for arthralgias. Negative for neck pain.   Skin: Negative.    Allergic/Immunologic: Negative.    Neurological: Negative.    Hematological: Negative.    Psychiatric/Behavioral: Negative.     All other systems reviewed and are negative.      Physical Exam  Physical Exam  Vitals and nursing note reviewed.   Constitutional:       Appearance: Normal appearance. He is normal weight.   HENT:      Head: Normocephalic.      Right Ear: External ear normal.      Left Ear: External ear normal.      Nose: Nose normal.      Mouth/Throat:      Mouth: Mucous membranes are moist.      Pharynx: Oropharynx is clear.   Eyes:      Extraocular Movements: Extraocular movements intact.      Conjunctiva/sclera: Conjunctivae normal.      Pupils: Pupils are equal, round, and reactive to light.   Cardiovascular:      Rate and Rhythm: Normal rate and regular rhythm.      Pulses: Normal pulses.      Heart sounds: Normal heart sounds.   Pulmonary:      Effort: Pulmonary effort is normal.      Breath sounds: Normal breath sounds.   Abdominal:      General: Abdomen is flat. Bowel sounds are normal. There is no distension.      Palpations: Abdomen is soft. There is no mass.      Tenderness: There is no abdominal tenderness. There is no right CVA tenderness, left CVA tenderness, guarding or rebound.      Hernia: No hernia is present.   Musculoskeletal:          General: Swelling and tenderness present. Normal range of motion.      Right hand: Tenderness and bony tenderness present. Normal strength. Normal sensation.      Left hand: Normal.        Hands:       Cervical back: Normal range of motion and neck supple.   Skin:     General: Skin is warm.      Capillary Refill: Capillary refill takes less than 2 seconds.   Neurological:      General: No focal deficit present.      Mental Status: He is alert and oriented to person, place, and time. Mental status is at baseline.   Psychiatric:         Mood and Affect: Mood normal.         Behavior: Behavior normal.         Thought Content: Thought content normal.         Judgment: Judgment normal.         Vital Signs  ED Triage Vitals [05/09/24 2103]   Temperature Pulse Respirations Blood Pressure SpO2   98 °F (36.7 °C) 74 16 126/61 97 %      Temp Source Heart Rate Source Patient Position - Orthostatic VS BP Location FiO2 (%)   Oral Monitor Sitting Left arm --      Pain Score       10 - Worst Possible Pain           Vitals:    05/09/24 2103 05/09/24 2115   BP: 126/61 126/61   Pulse: 74 74   Patient Position - Orthostatic VS: Sitting          Visual Acuity      ED Medications  Medications - No data to display    Diagnostic Studies  Results Reviewed       None                   XR hand 3+ views RIGHT   Final Result by Bi Quiroz MD (05/09 2308)      Mild diffuse degenerative changes without acute fracture or subluxation..      Workstation performed: [id number]                    Procedures  Procedures         ED Course                                             Medical Decision Making  DDx: hand fracture, hand sprain  Patient arriving stating isolated hand injury after a fall five days ago. Denies prodrome prior to fall. Patient states has balance issues secondary to prior back surgery.   Discussed specifically that he has no new back pain, no new numbness or tingling. Patient's had no other concerns except for right hand pain.  Patient reports was just informing ED staff of his chronic back pain.  Patient denies any chest pain, headache, nausea/vomiting, blurred double vision, weakness, abdominal pain, pain in pelvis. Review of systems multiple times reveals only a right hand injury/pain. Asked patient multiple times and reports here due to hand pain and swelling following fall. No acute findings on xray. Ace wrap provided. Patient aware to take tylenol and motrin as needed. Referral to hand surgery placed. Discussed follow up with hand surgery. Discussed PRICE.   Reviewed reasons to return to ed.  Patient verbalized understanding of diagnosis and agreement with discharge plan of care as well as understanding of reasons to return to ed.        Amount and/or Complexity of Data Reviewed  Radiology: ordered.             Disposition  Final diagnoses:   Fall, initial encounter   Hand pain     Time reflects when diagnosis was documented in both MDM as applicable and the Disposition within this note       Time User Action Codes Description Comment    5/9/2024 11:12 PM Chyna Raza [W19.XXXA] Fall, initial encounter     5/9/2024 11:12 PM Chyna Raza Add [M79.643] Hand pain           ED Disposition       ED Disposition   Discharge    Condition   Stable    Date/Time   Thu May 9, 2024 11:11 PM    Comment   Juan San discharge to home/self care.                   Follow-up Information       Follow up With Specialties Details Why Contact Info Additional Information    Sabra Magaña, DO Family Medicine Go to  If symptoms worsen [address] [facility] [address] [phone]       Mk Santillan MD Orthopedic Surgery, Hand Surgery Schedule an appointment as soon as possible for a visit in 2 days For further evaluation of symptoms [address] [facility] [address] [phone]        [facility] Emergency Department Emergency Medicine Go to  If symptoms worsen [address] [facility]  [place] [id number] [phone] [facility] Emergency Department, [address] [facility], [place], [place] [address]            Discharge Medication List as of 5/9/2024 11:14 PM        CONTINUE these medications which have NOT CHANGED    Details   ACCU-CHEK FABIANO PLUS test strip 4 (four) times a day, Starting Mon 12/17/2018, Historical Med      ACCU-CHEK FASTCLIX LANCETS MISC 4 (four) times a day, Starting Sat 12/22/2018, Historical Med      acetaminophen (TYLENOL) 500 mg tablet Take 1 tablet (500 mg total) by mouth every 6 (six) hours as needed for mild pain 500 mg tablet, Starting Sat 4/22/2023, OTC      Azelastine HCl 137 MCG/SPRAY SOLN 137 mcg into each nostril 2 (two) times a day as needed (allergies). Indications: Hayfever, Historical Med      cefepime (MAXIPIME) 2 g injection Inject 2 g into a catheter in a vein 2 (two) times a day. 2oml IVP over 3-5 minutes  pt not using   Indications: Bacteria in the Blood, Historical Med      celecoxib (CeleBREX) 100 mg capsule Take 1 capsule (100 mg total) by mouth 2 (two) times a day Take with food, Starting Mon 3/4/2024, Normal      cetirizine (ZyrTEC) 10 mg tablet Take 10 mg by mouth daily. PRN  Indications: Hayfever, Historical Med      CHOLECALCIFEROL PO Take 5,000 Units by mouth daily, Historical Med      clonazePAM (KlonoPIN) 1 mg tablet Take 1 mg by mouth 2 (two) times a day & 3rd pill PRN, Starting Mon 10/24/2022, Historical Med      finasteride (PROSCAR) 5 mg tablet Take 1 tablet (5 mg total) by mouth daily, Starting Tue 7/18/2023, Normal      fluticasone (FLONASE) 50 mcg/act nasal spray 1 spray into each nostril daily as needed, Historical Med      folic acid (FOLVITE) 1 mg tablet Take 2,000 mcg by mouth daily, Starting Mon 12/17/2018, Historical Med      gabapentin (NEURONTIN) 400 mg capsule Take 1 capsule (400 mg total) by mouth 2 (two) times a day, Starting Mon 3/4/2024, Normal      GNP Aspirin Low Dose 81 MG EC tablet Take 1  tablet (81 mg total) by mouth daily Do not start before April 25, 2023., Starting Tue 4/25/2023, Historical Med      Heparin Na, Pork, Lock Flsh PF 10 units/mL 10 Units by Intracatheter route 2 (two) times a day. Flush PICC 2x daily after IV antibiotic administration pt not using  Indications: Prevention of Closure of Indwelling Catheter, Historical Med      hydrOXYzine pamoate (VISTARIL) 50 mg capsule Take 50 mg by mouth 2 (two) times a day. Indications: Feeling Anxious, Historical Med      Jardiance 10 MG TABS Take 10 mg by mouth every morning, Starting Wed 7/20/2022, Historical Med      ketoconazole (NIZORAL) 2 % shampoo Apply 1 application. topically daily Use as directed, Starting Wed 10/10/2018, Historical Med      lidocaine (LIDODERM) 5 % Apply 1 patch topically over 12 hours daily Remove & Discard patch within 12 hours or as directed by MD, Starting Tue 2/27/2024, Normal      loratadine (CLARITIN) 10 mg tablet Take 10 mg by mouth daily. per med list humberto    Indications: Hayfever, Historical Med      losartan-hydrochlorothiazide (HYZAAR) 100-25 MG per tablet Take 1 tablet by mouth every morning, Starting Mon 12/17/2018, Historical Med      lovastatin (MEVACOR) 20 mg tablet Take 20 mg by mouth every morning, Starting Mon 12/17/2018, Historical Med      melatonin 3 mg Take 1 tablet (3 mg total) by mouth daily at bedtime, Starting Wed 10/11/2023, Normal      metFORMIN (GLUCOPHAGE) 1000 MG tablet Take 1,000 mg by mouth 2 (two) times a day with meals , Starting Wed 1/2/2019, Historical Med      methocarbamol (ROBAXIN) 750 mg tablet Take 1 tablet (750 mg total) by mouth every 8 (eight) hours as needed for muscle spasms, Starting Wed 10/11/2023, Normal      METOPROLOL SUCCINATE ER PO Take 75 mg by mouth every morning, Historical Med      mirtazapine (REMERON) 45 MG tablet Take 45 mg by mouth daily at bedtime pt to take 30mg at bedtime per latest dci , Starting Tue 12/11/2018, Historical Med      NIFEdipine  (PROCARDIA XL) 90 mg 24 hr tablet Take 120 mg by mouth every morning Takes 90 mg and 30 mg =120 mg every AM, Starting Mon 12/17/2018, Historical Med      patient supplied medication medical marijuana as needed per latest dci  Indications: ., Historical Med      senna (SENOKOT) 8.6 mg Take 1 tablet (8.6 mg total) by mouth 2 (two) times a day, Starting Tue 2/6/2024, Normal      sertraline (ZOLOFT) 100 mg tablet Take 100 mg by mouth every morning, Historical Med      sodium chloride (Monoject Sodium Chloride Flush) 0.9 % SOLN Inject 10 mL into a catheter in a vein 2 (two) times a day. Use before and after IV antibiotic administration  pt not using   Indications: FLUSHING, Historical Med      tamsulosin (FLOMAX) 0.4 mg Take 1 capsule (0.4 mg total) by mouth daily with dinner, Starting Tue 7/18/2023, Normal      traMADol (ULTRAM) 50 mg tablet Take 50 mg by mouth every 6 (six) hours as needed for moderate pain. Indications: Pain, Historical Med      vancomycin (VANCOCIN) 1 g Inject 1 g into a catheter in a vein 2 (two) times a day. 100ml  easy pump    Administer over 1 hour  pt not using  Indications: Infection caused by Bacteria, Historical Med                 PDMP Review         Value Time User    PDMP Reviewed  Yes 2/6/2024 12:43 PM Sintia Soto PA-C            ED Provider  Electronically Signed by             RUIZ Yang  05/10/24 0041

## 2024-05-18 ENCOUNTER — APPOINTMENT (EMERGENCY)
Dept: CT IMAGING | Facility: HOSPITAL | Age: 69
DRG: 552 | End: 2024-05-18
Payer: COMMERCIAL

## 2024-05-18 ENCOUNTER — HOSPITAL ENCOUNTER (INPATIENT)
Facility: HOSPITAL | Age: 69
LOS: 3 days | Discharge: HOME | DRG: 552 | End: 2024-05-21
Attending: EMERGENCY MEDICINE | Admitting: INTERNAL MEDICINE
Payer: COMMERCIAL

## 2024-05-18 DIAGNOSIS — G89.29 ACUTE EXACERBATION OF CHRONIC LOW BACK PAIN: Primary | ICD-10-CM

## 2024-05-18 DIAGNOSIS — R79.89 ELEVATED LACTIC ACID LEVEL: ICD-10-CM

## 2024-05-18 DIAGNOSIS — G89.29 CHRONIC BILATERAL LOW BACK PAIN WITH BILATERAL SCIATICA: ICD-10-CM

## 2024-05-18 DIAGNOSIS — M54.41 CHRONIC BILATERAL LOW BACK PAIN WITH BILATERAL SCIATICA: ICD-10-CM

## 2024-05-18 DIAGNOSIS — M54.16 LUMBAR RADICULOPATHY: ICD-10-CM

## 2024-05-18 DIAGNOSIS — Z87.39 PERSONAL HISTORY OF OSTEOMYELITIS: ICD-10-CM

## 2024-05-18 DIAGNOSIS — D72.829 LEUKOCYTOSIS: ICD-10-CM

## 2024-05-18 DIAGNOSIS — A08.4 VIRAL GASTROENTERITIS: ICD-10-CM

## 2024-05-18 DIAGNOSIS — M54.42 CHRONIC BILATERAL LOW BACK PAIN WITH BILATERAL SCIATICA: ICD-10-CM

## 2024-05-18 DIAGNOSIS — M54.50 ACUTE EXACERBATION OF CHRONIC LOW BACK PAIN: Primary | ICD-10-CM

## 2024-05-18 DIAGNOSIS — M46.47 DISCITIS OF LUMBOSACRAL REGION: ICD-10-CM

## 2024-05-18 DIAGNOSIS — Z98.1 S/P LUMBAR FUSION: ICD-10-CM

## 2024-05-18 LAB
ALBUMIN SERPL BCP-MCNC: 4.8 G/DL (ref 3.5–5)
ALP SERPL-CCNC: 99 U/L (ref 34–104)
ALT SERPL W P-5'-P-CCNC: 17 U/L (ref 7–52)
ANION GAP SERPL CALCULATED.3IONS-SCNC: 13 MMOL/L (ref 4–13)
AST SERPL W P-5'-P-CCNC: 25 U/L (ref 13–39)
BACTERIA UR QL AUTO: NORMAL /HPF
BASOPHILS # BLD AUTO: 0.06 THOUSANDS/ÂΜL (ref 0–0.1)
BASOPHILS NFR BLD AUTO: 0 % (ref 0–1)
BILIRUB SERPL-MCNC: 0.65 MG/DL (ref 0.2–1)
BILIRUB UR QL STRIP: NEGATIVE
BUN SERPL-MCNC: 13 MG/DL (ref 5–25)
CALCIUM SERPL-MCNC: 9.9 MG/DL (ref 8.4–10.2)
CHLORIDE SERPL-SCNC: 95 MMOL/L (ref 96–108)
CLARITY UR: CLEAR
CO2 SERPL-SCNC: 28 MMOL/L (ref 21–32)
COLOR UR: ABNORMAL
CREAT SERPL-MCNC: 0.78 MG/DL (ref 0.6–1.3)
EOSINOPHIL # BLD AUTO: 0.02 THOUSAND/ÂΜL (ref 0–0.61)
EOSINOPHIL NFR BLD AUTO: 0 % (ref 0–6)
ERYTHROCYTE [DISTWIDTH] IN BLOOD BY AUTOMATED COUNT: 16.7 % (ref 11.6–15.1)
GFR SERPL CREATININE-BSD FRML MDRD: 94 ML/MIN/1.73SQ M
GLUCOSE SERPL-MCNC: 170 MG/DL (ref 65–140)
GLUCOSE UR STRIP-MCNC: ABNORMAL MG/DL
HCT VFR BLD AUTO: 40.5 % (ref 36.5–49.3)
HGB BLD-MCNC: 12.3 G/DL (ref 12–17)
HGB UR QL STRIP.AUTO: ABNORMAL
IMM GRANULOCYTES # BLD AUTO: 0.08 THOUSAND/UL (ref 0–0.2)
IMM GRANULOCYTES NFR BLD AUTO: 1 % (ref 0–2)
KETONES UR STRIP-MCNC: ABNORMAL MG/DL
LACTATE SERPL-SCNC: 2.8 MMOL/L (ref 0.5–2)
LEUKOCYTE ESTERASE UR QL STRIP: NEGATIVE
LYMPHOCYTES # BLD AUTO: 1.79 THOUSANDS/ÂΜL (ref 0.6–4.47)
LYMPHOCYTES NFR BLD AUTO: 12 % (ref 14–44)
MCH RBC QN AUTO: 24.5 PG (ref 26.8–34.3)
MCHC RBC AUTO-ENTMCNC: 30.4 G/DL (ref 31.4–37.4)
MCV RBC AUTO: 81 FL (ref 82–98)
MONOCYTES # BLD AUTO: 1.08 THOUSAND/ÂΜL (ref 0.17–1.22)
MONOCYTES NFR BLD AUTO: 7 % (ref 4–12)
NEUTROPHILS # BLD AUTO: 11.57 THOUSANDS/ÂΜL (ref 1.85–7.62)
NEUTS SEG NFR BLD AUTO: 80 % (ref 43–75)
NITRITE UR QL STRIP: NEGATIVE
NON-SQ EPI CELLS URNS QL MICRO: NORMAL /HPF
NRBC BLD AUTO-RTO: 0 /100 WBCS
OTHER STN SPEC: NORMAL
PH UR STRIP.AUTO: 7.5 [PH]
PLATELET # BLD AUTO: 459 THOUSANDS/UL (ref 149–390)
PMV BLD AUTO: 9.7 FL (ref 8.9–12.7)
POTASSIUM SERPL-SCNC: 3.2 MMOL/L (ref 3.5–5.3)
PROCALCITONIN SERPL-MCNC: 0.07 NG/ML
PROT SERPL-MCNC: 8.5 G/DL (ref 6.4–8.4)
PROT UR STRIP-MCNC: ABNORMAL MG/DL
RBC # BLD AUTO: 5.03 MILLION/UL (ref 3.88–5.62)
RBC #/AREA URNS AUTO: NORMAL /HPF
SODIUM SERPL-SCNC: 136 MMOL/L (ref 135–147)
SP GR UR STRIP.AUTO: 1.01 (ref 1–1.03)
UROBILINOGEN UR STRIP-ACNC: <2 MG/DL
WBC # BLD AUTO: 14.6 THOUSAND/UL (ref 4.31–10.16)
WBC #/AREA URNS AUTO: NORMAL /HPF

## 2024-05-18 PROCEDURE — 80053 COMPREHEN METABOLIC PANEL: CPT | Performed by: EMERGENCY MEDICINE

## 2024-05-18 PROCEDURE — 96375 TX/PRO/DX INJ NEW DRUG ADDON: CPT

## 2024-05-18 PROCEDURE — 72131 CT LUMBAR SPINE W/O DYE: CPT

## 2024-05-18 PROCEDURE — 84145 PROCALCITONIN (PCT): CPT | Performed by: EMERGENCY MEDICINE

## 2024-05-18 PROCEDURE — 96361 HYDRATE IV INFUSION ADD-ON: CPT

## 2024-05-18 PROCEDURE — 85025 COMPLETE CBC W/AUTO DIFF WBC: CPT | Performed by: EMERGENCY MEDICINE

## 2024-05-18 PROCEDURE — 83605 ASSAY OF LACTIC ACID: CPT | Performed by: EMERGENCY MEDICINE

## 2024-05-18 PROCEDURE — 87040 BLOOD CULTURE FOR BACTERIA: CPT | Performed by: EMERGENCY MEDICINE

## 2024-05-18 PROCEDURE — 81001 URINALYSIS AUTO W/SCOPE: CPT | Performed by: EMERGENCY MEDICINE

## 2024-05-18 PROCEDURE — 36415 COLL VENOUS BLD VENIPUNCTURE: CPT | Performed by: EMERGENCY MEDICINE

## 2024-05-18 PROCEDURE — 99285 EMERGENCY DEPT VISIT HI MDM: CPT | Performed by: EMERGENCY MEDICINE

## 2024-05-18 PROCEDURE — 99284 EMERGENCY DEPT VISIT MOD MDM: CPT

## 2024-05-18 PROCEDURE — 96374 THER/PROPH/DIAG INJ IV PUSH: CPT

## 2024-05-18 RX ORDER — KETOROLAC TROMETHAMINE 30 MG/ML
15 INJECTION, SOLUTION INTRAMUSCULAR; INTRAVENOUS ONCE
Status: COMPLETED | OUTPATIENT
Start: 2024-05-18 | End: 2024-05-18

## 2024-05-18 RX ORDER — DIAZEPAM 5 MG/ML
5 INJECTION, SOLUTION INTRAMUSCULAR; INTRAVENOUS ONCE
Status: COMPLETED | OUTPATIENT
Start: 2024-05-18 | End: 2024-05-18

## 2024-05-18 RX ORDER — ONDANSETRON 2 MG/ML
4 INJECTION INTRAMUSCULAR; INTRAVENOUS ONCE
Status: COMPLETED | OUTPATIENT
Start: 2024-05-18 | End: 2024-05-18

## 2024-05-18 RX ORDER — ACETAMINOPHEN 325 MG/1
650 TABLET ORAL ONCE
Status: COMPLETED | OUTPATIENT
Start: 2024-05-18 | End: 2024-05-18

## 2024-05-18 RX ORDER — HYDROMORPHONE HCL/PF 1 MG/ML
0.5 SYRINGE (ML) INJECTION ONCE
Status: COMPLETED | OUTPATIENT
Start: 2024-05-18 | End: 2024-05-18

## 2024-05-18 RX ORDER — POTASSIUM CHLORIDE 20 MEQ/1
40 TABLET, EXTENDED RELEASE ORAL ONCE
Status: COMPLETED | OUTPATIENT
Start: 2024-05-18 | End: 2024-05-18

## 2024-05-18 RX ORDER — SODIUM CHLORIDE 9 MG/ML
125 INJECTION, SOLUTION INTRAVENOUS CONTINUOUS
Status: CANCELLED | OUTPATIENT
Start: 2024-05-18

## 2024-05-18 RX ADMIN — DIAZEPAM 5 MG: 5 INJECTION, SOLUTION INTRAMUSCULAR; INTRAVENOUS at 21:44

## 2024-05-18 RX ADMIN — ONDANSETRON 4 MG: 2 INJECTION INTRAMUSCULAR; INTRAVENOUS at 21:44

## 2024-05-18 RX ADMIN — ACETAMINOPHEN 650 MG: 325 TABLET, FILM COATED ORAL at 21:47

## 2024-05-18 RX ADMIN — POTASSIUM CHLORIDE 40 MEQ: 1500 TABLET, EXTENDED RELEASE ORAL at 23:20

## 2024-05-18 RX ADMIN — SODIUM CHLORIDE 1000 ML: 0.9 INJECTION, SOLUTION INTRAVENOUS at 23:20

## 2024-05-18 RX ADMIN — HYDROMORPHONE HYDROCHLORIDE 0.5 MG: 1 INJECTION, SOLUTION INTRAMUSCULAR; INTRAVENOUS; SUBCUTANEOUS at 22:27

## 2024-05-18 RX ADMIN — SODIUM CHLORIDE 1000 ML: 0.9 INJECTION, SOLUTION INTRAVENOUS at 21:42

## 2024-05-18 RX ADMIN — KETOROLAC TROMETHAMINE 15 MG: 30 INJECTION, SOLUTION INTRAMUSCULAR; INTRAVENOUS at 21:46

## 2024-05-19 ENCOUNTER — APPOINTMENT (INPATIENT)
Dept: MRI IMAGING | Facility: HOSPITAL | Age: 69
DRG: 552 | End: 2024-05-19
Payer: COMMERCIAL

## 2024-05-19 PROBLEM — D72.829 LEUKOCYTOSIS: Status: ACTIVE | Noted: 2024-05-19

## 2024-05-19 PROBLEM — E11.65 TYPE 2 DIABETES MELLITUS WITH HYPERGLYCEMIA, WITHOUT LONG-TERM CURRENT USE OF INSULIN (HCC): Status: ACTIVE | Noted: 2024-05-19

## 2024-05-19 PROBLEM — D72.829 LEUKOCYTOSIS: Chronic | Status: ACTIVE | Noted: 2024-05-19

## 2024-05-19 PROBLEM — E87.20 LACTIC ACIDOSIS: Status: ACTIVE | Noted: 2024-05-19

## 2024-05-19 PROBLEM — A08.4 VIRAL GASTROENTERITIS: Status: ACTIVE | Noted: 2024-05-19

## 2024-05-19 LAB
ANION GAP SERPL CALCULATED.3IONS-SCNC: 11 MMOL/L (ref 4–13)
ATRIAL RATE: 68 BPM
BUN SERPL-MCNC: 11 MG/DL (ref 5–25)
CALCIUM SERPL-MCNC: 9.2 MG/DL (ref 8.4–10.2)
CHLORIDE SERPL-SCNC: 96 MMOL/L (ref 96–108)
CO2 SERPL-SCNC: 28 MMOL/L (ref 21–32)
CREAT SERPL-MCNC: 0.72 MG/DL (ref 0.6–1.3)
CRP SERPL QL: 2.9 MG/L
ERYTHROCYTE [DISTWIDTH] IN BLOOD BY AUTOMATED COUNT: 16.8 % (ref 11.6–15.1)
ERYTHROCYTE [SEDIMENTATION RATE] IN BLOOD: 11 MM/HOUR (ref 0–19)
EST. AVERAGE GLUCOSE BLD GHB EST-MCNC: 180 MG/DL
GFR SERPL CREATININE-BSD FRML MDRD: 97 ML/MIN/1.73SQ M
GLUCOSE SERPL-MCNC: 121 MG/DL (ref 65–140)
GLUCOSE SERPL-MCNC: 160 MG/DL (ref 65–140)
GLUCOSE SERPL-MCNC: 172 MG/DL (ref 65–140)
GLUCOSE SERPL-MCNC: 173 MG/DL (ref 65–140)
GLUCOSE SERPL-MCNC: 186 MG/DL (ref 65–140)
HBA1C MFR BLD: 7.9 %
HCT VFR BLD AUTO: 39.7 % (ref 36.5–49.3)
HGB BLD-MCNC: 12.1 G/DL (ref 12–17)
LACTATE SERPL-SCNC: 1 MMOL/L (ref 0.5–2)
MAGNESIUM SERPL-MCNC: 1.9 MG/DL (ref 1.9–2.7)
MCH RBC QN AUTO: 24.8 PG (ref 26.8–34.3)
MCHC RBC AUTO-ENTMCNC: 30.5 G/DL (ref 31.4–37.4)
MCV RBC AUTO: 82 FL (ref 82–98)
P AXIS: 31 DEGREES
PLATELET # BLD AUTO: 477 THOUSANDS/UL (ref 149–390)
PMV BLD AUTO: 10 FL (ref 8.9–12.7)
POTASSIUM SERPL-SCNC: 2.9 MMOL/L (ref 3.5–5.3)
PR INTERVAL: 136 MS
PROCALCITONIN SERPL-MCNC: 0.07 NG/ML
QRS AXIS: 50 DEGREES
QRSD INTERVAL: 86 MS
QT INTERVAL: 396 MS
QTC INTERVAL: 421 MS
RBC # BLD AUTO: 4.87 MILLION/UL (ref 3.88–5.62)
SODIUM SERPL-SCNC: 135 MMOL/L (ref 135–147)
T WAVE AXIS: 51 DEGREES
VENTRICULAR RATE: 68 BPM
WBC # BLD AUTO: 17.43 THOUSAND/UL (ref 4.31–10.16)

## 2024-05-19 PROCEDURE — 93005 ELECTROCARDIOGRAM TRACING: CPT

## 2024-05-19 PROCEDURE — 85652 RBC SED RATE AUTOMATED: CPT | Performed by: INTERNAL MEDICINE

## 2024-05-19 PROCEDURE — A9585 GADOBUTROL INJECTION: HCPCS | Performed by: INTERNAL MEDICINE

## 2024-05-19 PROCEDURE — 72158 MRI LUMBAR SPINE W/O & W/DYE: CPT

## 2024-05-19 PROCEDURE — 85027 COMPLETE CBC AUTOMATED: CPT | Performed by: PHYSICIAN ASSISTANT

## 2024-05-19 PROCEDURE — 82948 REAGENT STRIP/BLOOD GLUCOSE: CPT

## 2024-05-19 PROCEDURE — 36415 COLL VENOUS BLD VENIPUNCTURE: CPT | Performed by: EMERGENCY MEDICINE

## 2024-05-19 PROCEDURE — 83036 HEMOGLOBIN GLYCOSYLATED A1C: CPT | Performed by: PHYSICIAN ASSISTANT

## 2024-05-19 PROCEDURE — 83605 ASSAY OF LACTIC ACID: CPT | Performed by: EMERGENCY MEDICINE

## 2024-05-19 PROCEDURE — 83735 ASSAY OF MAGNESIUM: CPT | Performed by: PHYSICIAN ASSISTANT

## 2024-05-19 PROCEDURE — NC001 PR NO CHARGE: Performed by: NEUROLOGICAL SURGERY

## 2024-05-19 PROCEDURE — 84145 PROCALCITONIN (PCT): CPT | Performed by: INTERNAL MEDICINE

## 2024-05-19 PROCEDURE — 86140 C-REACTIVE PROTEIN: CPT | Performed by: INTERNAL MEDICINE

## 2024-05-19 PROCEDURE — 99223 1ST HOSP IP/OBS HIGH 75: CPT | Performed by: PHYSICIAN ASSISTANT

## 2024-05-19 PROCEDURE — 93010 ELECTROCARDIOGRAM REPORT: CPT | Performed by: INTERNAL MEDICINE

## 2024-05-19 PROCEDURE — 80048 BASIC METABOLIC PNL TOTAL CA: CPT | Performed by: PHYSICIAN ASSISTANT

## 2024-05-19 RX ORDER — TAMSULOSIN HYDROCHLORIDE 0.4 MG/1
0.4 CAPSULE ORAL
Status: DISCONTINUED | OUTPATIENT
Start: 2024-05-19 | End: 2024-05-21 | Stop reason: HOSPADM

## 2024-05-19 RX ORDER — CLONAZEPAM 1 MG/1
1 TABLET ORAL 2 TIMES DAILY
Status: DISCONTINUED | OUTPATIENT
Start: 2024-05-19 | End: 2024-05-21 | Stop reason: HOSPADM

## 2024-05-19 RX ORDER — CELECOXIB 100 MG/1
100 CAPSULE ORAL 2 TIMES DAILY
Status: DISCONTINUED | OUTPATIENT
Start: 2024-05-19 | End: 2024-05-21 | Stop reason: HOSPADM

## 2024-05-19 RX ORDER — SODIUM CHLORIDE, SODIUM GLUCONATE, SODIUM ACETATE, POTASSIUM CHLORIDE, MAGNESIUM CHLORIDE, SODIUM PHOSPHATE, DIBASIC, AND POTASSIUM PHOSPHATE .53; .5; .37; .037; .03; .012; .00082 G/100ML; G/100ML; G/100ML; G/100ML; G/100ML; G/100ML; G/100ML
125 INJECTION, SOLUTION INTRAVENOUS CONTINUOUS
Status: DISPENSED | OUTPATIENT
Start: 2024-05-19 | End: 2024-05-19

## 2024-05-19 RX ORDER — HYDROXYZINE HYDROCHLORIDE 25 MG/1
50 TABLET, FILM COATED ORAL 2 TIMES DAILY
Status: DISCONTINUED | OUTPATIENT
Start: 2024-05-19 | End: 2024-05-21 | Stop reason: HOSPADM

## 2024-05-19 RX ORDER — FOLIC ACID 1 MG/1
2000 TABLET ORAL DAILY
Status: DISCONTINUED | OUTPATIENT
Start: 2024-05-19 | End: 2024-05-21 | Stop reason: HOSPADM

## 2024-05-19 RX ORDER — LIDOCAINE 50 MG/G
1 PATCH TOPICAL DAILY
Status: DISCONTINUED | OUTPATIENT
Start: 2024-05-20 | End: 2024-05-19

## 2024-05-19 RX ORDER — ENOXAPARIN SODIUM 100 MG/ML
40 INJECTION SUBCUTANEOUS DAILY
Status: DISCONTINUED | OUTPATIENT
Start: 2024-05-19 | End: 2024-05-21 | Stop reason: HOSPADM

## 2024-05-19 RX ORDER — LIDOCAINE 50 MG/G
1 PATCH TOPICAL DAILY
Status: DISCONTINUED | OUTPATIENT
Start: 2024-05-19 | End: 2024-05-20

## 2024-05-19 RX ORDER — VANCOMYCIN HYDROCHLORIDE 1 G/200ML
1000 INJECTION, SOLUTION INTRAVENOUS EVERY 12 HOURS
Status: DISCONTINUED | OUTPATIENT
Start: 2024-05-19 | End: 2024-05-20

## 2024-05-19 RX ORDER — POTASSIUM CHLORIDE 20 MEQ/1
40 TABLET, EXTENDED RELEASE ORAL ONCE
Status: COMPLETED | OUTPATIENT
Start: 2024-05-19 | End: 2024-05-19

## 2024-05-19 RX ORDER — NIFEDIPINE 30 MG/1
120 TABLET, EXTENDED RELEASE ORAL DAILY
Status: DISCONTINUED | OUTPATIENT
Start: 2024-05-19 | End: 2024-05-21 | Stop reason: HOSPADM

## 2024-05-19 RX ORDER — MAGNESIUM HYDROXIDE/ALUMINUM HYDROXICE/SIMETHICONE 120; 1200; 1200 MG/30ML; MG/30ML; MG/30ML
30 SUSPENSION ORAL EVERY 6 HOURS PRN
Status: DISCONTINUED | OUTPATIENT
Start: 2024-05-19 | End: 2024-05-21 | Stop reason: HOSPADM

## 2024-05-19 RX ORDER — HYDROCHLOROTHIAZIDE 25 MG/1
25 TABLET ORAL EVERY MORNING
Status: DISCONTINUED | OUTPATIENT
Start: 2024-05-19 | End: 2024-05-21 | Stop reason: HOSPADM

## 2024-05-19 RX ORDER — INSULIN LISPRO 100 [IU]/ML
1-6 INJECTION, SOLUTION INTRAVENOUS; SUBCUTANEOUS
Status: DISCONTINUED | OUTPATIENT
Start: 2024-05-19 | End: 2024-05-21 | Stop reason: HOSPADM

## 2024-05-19 RX ORDER — ONDANSETRON 2 MG/ML
4 INJECTION INTRAMUSCULAR; INTRAVENOUS EVERY 6 HOURS PRN
Status: DISCONTINUED | OUTPATIENT
Start: 2024-05-19 | End: 2024-05-19

## 2024-05-19 RX ORDER — PRAVASTATIN SODIUM 20 MG
20 TABLET ORAL
Status: DISCONTINUED | OUTPATIENT
Start: 2024-05-19 | End: 2024-05-21 | Stop reason: HOSPADM

## 2024-05-19 RX ORDER — NIFEDIPINE 30 MG/1
30 TABLET, EXTENDED RELEASE ORAL DAILY
Status: DISCONTINUED | OUTPATIENT
Start: 2024-05-19 | End: 2024-05-19

## 2024-05-19 RX ORDER — OXYCODONE HYDROCHLORIDE 5 MG/1
5 TABLET ORAL EVERY 4 HOURS PRN
Status: DISCONTINUED | OUTPATIENT
Start: 2024-05-19 | End: 2024-05-21 | Stop reason: HOSPADM

## 2024-05-19 RX ORDER — HYDROMORPHONE HCL/PF 1 MG/ML
0.5 SYRINGE (ML) INJECTION EVERY 4 HOURS PRN
Status: DISCONTINUED | OUTPATIENT
Start: 2024-05-19 | End: 2024-05-20

## 2024-05-19 RX ORDER — INSULIN LISPRO 100 [IU]/ML
1-5 INJECTION, SOLUTION INTRAVENOUS; SUBCUTANEOUS
Status: DISCONTINUED | OUTPATIENT
Start: 2024-05-19 | End: 2024-05-21 | Stop reason: HOSPADM

## 2024-05-19 RX ORDER — OXYCODONE HYDROCHLORIDE 10 MG/1
10 TABLET ORAL EVERY 4 HOURS PRN
Status: DISCONTINUED | OUTPATIENT
Start: 2024-05-19 | End: 2024-05-21 | Stop reason: HOSPADM

## 2024-05-19 RX ORDER — FINASTERIDE 5 MG/1
5 TABLET, FILM COATED ORAL DAILY
Status: DISCONTINUED | OUTPATIENT
Start: 2024-05-19 | End: 2024-05-21 | Stop reason: HOSPADM

## 2024-05-19 RX ORDER — NIFEDIPINE 90 MG/1
90 TABLET, EXTENDED RELEASE ORAL EVERY MORNING
Status: DISCONTINUED | OUTPATIENT
Start: 2024-05-19 | End: 2024-05-19

## 2024-05-19 RX ORDER — SERTRALINE HYDROCHLORIDE 100 MG/1
100 TABLET, FILM COATED ORAL EVERY MORNING
Status: DISCONTINUED | OUTPATIENT
Start: 2024-05-19 | End: 2024-05-21 | Stop reason: HOSPADM

## 2024-05-19 RX ORDER — HYDROMORPHONE HCL/PF 1 MG/ML
0.5 SYRINGE (ML) INJECTION ONCE
Status: COMPLETED | OUTPATIENT
Start: 2024-05-19 | End: 2024-05-19

## 2024-05-19 RX ORDER — METHOCARBAMOL 500 MG/1
750 TABLET, FILM COATED ORAL EVERY 6 HOURS PRN
Status: DISCONTINUED | OUTPATIENT
Start: 2024-05-19 | End: 2024-05-20

## 2024-05-19 RX ORDER — ACETAMINOPHEN 325 MG/1
975 TABLET ORAL EVERY 8 HOURS SCHEDULED
Status: DISCONTINUED | OUTPATIENT
Start: 2024-05-19 | End: 2024-05-21 | Stop reason: HOSPADM

## 2024-05-19 RX ORDER — LOSARTAN POTASSIUM 50 MG/1
100 TABLET ORAL EVERY MORNING
Status: DISCONTINUED | OUTPATIENT
Start: 2024-05-19 | End: 2024-05-21 | Stop reason: HOSPADM

## 2024-05-19 RX ORDER — MIRTAZAPINE 15 MG/1
45 TABLET, FILM COATED ORAL
Status: DISCONTINUED | OUTPATIENT
Start: 2024-05-19 | End: 2024-05-21 | Stop reason: HOSPADM

## 2024-05-19 RX ORDER — CLONAZEPAM 1 MG/1
1 TABLET ORAL DAILY PRN
Status: DISCONTINUED | OUTPATIENT
Start: 2024-05-19 | End: 2024-05-21 | Stop reason: HOSPADM

## 2024-05-19 RX ORDER — GABAPENTIN 400 MG/1
400 CAPSULE ORAL 2 TIMES DAILY
Status: DISCONTINUED | OUTPATIENT
Start: 2024-05-19 | End: 2024-05-21 | Stop reason: HOSPADM

## 2024-05-19 RX ORDER — ONDANSETRON 2 MG/ML
4 INJECTION INTRAMUSCULAR; INTRAVENOUS EVERY 4 HOURS PRN
Status: DISCONTINUED | OUTPATIENT
Start: 2024-05-19 | End: 2024-05-21 | Stop reason: HOSPADM

## 2024-05-19 RX ORDER — SENNOSIDES 8.6 MG
1 TABLET ORAL
Status: DISCONTINUED | OUTPATIENT
Start: 2024-05-19 | End: 2024-05-21 | Stop reason: HOSPADM

## 2024-05-19 RX ORDER — ONDANSETRON 2 MG/ML
4 INJECTION INTRAMUSCULAR; INTRAVENOUS ONCE
Status: COMPLETED | OUTPATIENT
Start: 2024-05-19 | End: 2024-05-19

## 2024-05-19 RX ORDER — GADOBUTROL 604.72 MG/ML
8 INJECTION INTRAVENOUS
Status: COMPLETED | OUTPATIENT
Start: 2024-05-19 | End: 2024-05-19

## 2024-05-19 RX ADMIN — CLONAZEPAM 1 MG: 1 TABLET ORAL at 10:07

## 2024-05-19 RX ADMIN — LIDOCAINE 5% 1 PATCH: 700 PATCH TOPICAL at 21:36

## 2024-05-19 RX ADMIN — ACETAMINOPHEN 975 MG: 325 TABLET, FILM COATED ORAL at 06:02

## 2024-05-19 RX ADMIN — PRAVASTATIN SODIUM 20 MG: 20 TABLET ORAL at 15:31

## 2024-05-19 RX ADMIN — OXYCODONE HYDROCHLORIDE 10 MG: 10 TABLET ORAL at 10:06

## 2024-05-19 RX ADMIN — NIFEDIPINE 120 MG: 30 TABLET, EXTENDED RELEASE ORAL at 10:06

## 2024-05-19 RX ADMIN — INSULIN LISPRO 1 UNITS: 100 INJECTION, SOLUTION INTRAVENOUS; SUBCUTANEOUS at 11:08

## 2024-05-19 RX ADMIN — CLONAZEPAM 1 MG: 1 TABLET ORAL at 18:19

## 2024-05-19 RX ADMIN — CELECOXIB 100 MG: 100 CAPSULE ORAL at 18:19

## 2024-05-19 RX ADMIN — POTASSIUM CHLORIDE 40 MEQ: 1500 TABLET, EXTENDED RELEASE ORAL at 09:09

## 2024-05-19 RX ADMIN — SERTRALINE 100 MG: 100 TABLET, FILM COATED ORAL at 10:11

## 2024-05-19 RX ADMIN — ONDANSETRON 4 MG: 2 INJECTION INTRAMUSCULAR; INTRAVENOUS at 21:51

## 2024-05-19 RX ADMIN — ONDANSETRON 4 MG: 2 INJECTION INTRAMUSCULAR; INTRAVENOUS at 09:06

## 2024-05-19 RX ADMIN — ACETAMINOPHEN 975 MG: 325 TABLET, FILM COATED ORAL at 21:30

## 2024-05-19 RX ADMIN — ACETAMINOPHEN 975 MG: 325 TABLET, FILM COATED ORAL at 13:32

## 2024-05-19 RX ADMIN — OXYCODONE HYDROCHLORIDE 10 MG: 10 TABLET ORAL at 06:02

## 2024-05-19 RX ADMIN — SODIUM CHLORIDE, SODIUM GLUCONATE, SODIUM ACETATE, POTASSIUM CHLORIDE, MAGNESIUM CHLORIDE, SODIUM PHOSPHATE, DIBASIC, AND POTASSIUM PHOSPHATE 125 ML/HR: .53; .5; .37; .037; .03; .012; .00082 INJECTION, SOLUTION INTRAVENOUS at 06:02

## 2024-05-19 RX ADMIN — HYDROXYZINE HYDROCHLORIDE 50 MG: 25 TABLET ORAL at 10:06

## 2024-05-19 RX ADMIN — OXYCODONE HYDROCHLORIDE 10 MG: 10 TABLET ORAL at 15:30

## 2024-05-19 RX ADMIN — OXYCODONE HYDROCHLORIDE 10 MG: 10 TABLET ORAL at 01:05

## 2024-05-19 RX ADMIN — HYDROMORPHONE HYDROCHLORIDE 0.5 MG: 1 INJECTION, SOLUTION INTRAMUSCULAR; INTRAVENOUS; SUBCUTANEOUS at 21:50

## 2024-05-19 RX ADMIN — INSULIN LISPRO 1 UNITS: 100 INJECTION, SOLUTION INTRAVENOUS; SUBCUTANEOUS at 21:57

## 2024-05-19 RX ADMIN — ONDANSETRON 4 MG: 2 INJECTION INTRAMUSCULAR; INTRAVENOUS at 13:32

## 2024-05-19 RX ADMIN — HYDROXYZINE HYDROCHLORIDE 50 MG: 25 TABLET ORAL at 18:19

## 2024-05-19 RX ADMIN — TAMSULOSIN HYDROCHLORIDE 0.4 MG: 0.4 CAPSULE ORAL at 15:30

## 2024-05-19 RX ADMIN — LOSARTAN POTASSIUM 100 MG: 50 TABLET, FILM COATED ORAL at 10:07

## 2024-05-19 RX ADMIN — HYDROMORPHONE HYDROCHLORIDE 0.5 MG: 1 INJECTION, SOLUTION INTRAMUSCULAR; INTRAVENOUS; SUBCUTANEOUS at 13:32

## 2024-05-19 RX ADMIN — HYDROCHLOROTHIAZIDE 25 MG: 25 TABLET ORAL at 10:07

## 2024-05-19 RX ADMIN — HYDROMORPHONE HYDROCHLORIDE 0.5 MG: 1 INJECTION, SOLUTION INTRAMUSCULAR; INTRAVENOUS; SUBCUTANEOUS at 18:35

## 2024-05-19 RX ADMIN — VANCOMYCIN HYDROCHLORIDE 1000 MG: 1 INJECTION, SOLUTION INTRAVENOUS at 18:20

## 2024-05-19 RX ADMIN — GADOBUTROL 8 ML: 604.72 INJECTION INTRAVENOUS at 12:40

## 2024-05-19 RX ADMIN — ONDANSETRON 4 MG: 2 INJECTION INTRAMUSCULAR; INTRAVENOUS at 04:55

## 2024-05-19 RX ADMIN — GABAPENTIN 400 MG: 400 CAPSULE ORAL at 20:25

## 2024-05-19 RX ADMIN — POTASSIUM CHLORIDE 40 MEQ: 1500 TABLET, EXTENDED RELEASE ORAL at 09:22

## 2024-05-19 RX ADMIN — VANCOMYCIN HYDROCHLORIDE 1750 MG: 1 INJECTION, POWDER, LYOPHILIZED, FOR SOLUTION INTRAVENOUS at 06:02

## 2024-05-19 RX ADMIN — HYDROMORPHONE HYDROCHLORIDE 0.5 MG: 1 INJECTION, SOLUTION INTRAMUSCULAR; INTRAVENOUS; SUBCUTANEOUS at 09:22

## 2024-05-19 RX ADMIN — ONDANSETRON 4 MG: 2 INJECTION INTRAMUSCULAR; INTRAVENOUS at 01:38

## 2024-05-19 RX ADMIN — METOPROLOL SUCCINATE 75 MG: 50 TABLET, EXTENDED RELEASE ORAL at 10:06

## 2024-05-19 RX ADMIN — OXYCODONE HYDROCHLORIDE 10 MG: 10 TABLET ORAL at 20:29

## 2024-05-19 RX ADMIN — MIRTAZAPINE 45 MG: 15 TABLET, FILM COATED ORAL at 21:30

## 2024-05-19 RX ADMIN — INSULIN LISPRO 1 UNITS: 100 INJECTION, SOLUTION INTRAVENOUS; SUBCUTANEOUS at 07:36

## 2024-05-19 NOTE — ASSESSMENT & PLAN NOTE
WBC at 14.60K on admit   Unclear if truly with infection, CT with possible discitis versus degeneration at site of prior discitis  High suspicion that leukocytosis is actually secondary to viral gastroenteritis, will continue IV vancomycin for now until MRI lumbar spine performed to determine if discitis is acute  Trend CBC and fever curve

## 2024-05-19 NOTE — ASSESSMENT & PLAN NOTE
Lactic acid at 2.8, improved status post IVF to 1.0  Lactic acidosis secondary to dehydration from GI losses from viral gastroenteritis  Continue IVF

## 2024-05-19 NOTE — ASSESSMENT & PLAN NOTE
Developed nausea and emesis approximately 3 days ago with generalized abdominal pain  Wife with similar symptoms  Suspect viral gastroenteritis  Continue supportive care with antiemetics and IVF

## 2024-05-19 NOTE — ASSESSMENT & PLAN NOTE
Home regimen: Hyzaar 100-25 Mg daily, nifedipine 120 Mg daily, metoprolol 75 Mg daily  BP elevated, suspect multifactorial as patient had not taken all of his BP meds yet and he was in pain  Will continue home medications and trend BP per unit protocol

## 2024-05-19 NOTE — ASSESSMENT & PLAN NOTE
Admission in January 2024 requiring IR biopsy and IV vancomycin and cefepime  Neurosurgery consulted due to CT findings on admit  Continue vancomycin until MRI performed

## 2024-05-19 NOTE — ASSESSMENT & PLAN NOTE
"Presents to the ED with worsening lower back pain for the last week, seen in [facility] on 5/17 and discharged home  History of discitis in January 2024 requiring IV antibiotics and IR biopsy that showed no growth  - completed IV vancomycin and cefepime outpatient on 3/08/2024  History of lumbar fusion April 2023  CT L-spine: \"There is a spinal fusion spanning T12-L 1 levels.  There is a retrolisthesis of L1 on L2 and a similar retrolisthesis of L5 on S1.  Generalized sclerosis of the lumbar, sacral, and pelvic marrow.  Disc degeneration and/or discitis at that L1-L2 and L5-S1 levels.  These changes are not necessarily acute.  No paraspinous mass or hematoma\"  received multiple pain medications in the ED, however continued to be in pain  ED spoke with neurosurgery-recommend admission for MRI and pain control  MRI lumbar spine ordered  Pain control: Oxy 5/10 as needed and Dilaudid for breakthrough pain, Tylenol 975 every 8 hours  Ordered vancomycin until MRI obtained due to questionable discitis on CT scan, however patient without any fevers or chills  "

## 2024-05-19 NOTE — ED PROVIDER NOTES
History  Chief Complaint   Patient presents with    Pain     Pt has back surgery over a year ago. Pt was just seen last night at [facility] for his chronic back pain. Pt has not followed up with anyone for his chronic back pain. Pt was suppose to see a pain specialist      69 yo male presents with acute on chronic pain secondary to low back pain x past week.  Denies recent trauma, is clearly uncomfortable and unable to find position of comfort.  Was seen at [facility] yesterday for same - they did blood work and sent him home on tramadol which is not touching his pain.  He denies bowel/bladder, saddle anesthesia, reflexes are intact.  Pt is s/p lumbar postlaminectomy syndrome-fusion T12-S1 by Dr Moraes 2023 due to lumbar foraminal stenosis and lumbar radiculopathy.  He was admitted to the hospital on 2024 for osteomyelitis and discitis at L5-S1.  He was followed by I&D and treated with IV vancomycin and completed antibiotic treatment on 2024. . Denies fever/chills.      History provided by:  Patient   used: No    Back Pain  Location:  Lumbar spine  Quality:  Aching (spasms)  Radiates to:  R posterior upper leg and L posterior upper leg  Pain severity:  Severe  Onset quality:  Gradual  Duration:  1 week  Timing:  Constant  Progression:  Waxing and waning  Chronicity: acute on chronic.  Relieved by:  Nothing  Worsened by:  Movement  Associated symptoms: no abdominal pain, no bladder incontinence, no bowel incontinence, no chest pain, no dysuria, no fever, no paresthesias and no tingling        Prior to Admission Medications   Prescriptions Last Dose Informant Patient Reported? Taking?   ACCU-CHEK FABIANO PLUS test strip  Self Yes No   Si (four) times a day   ACCU-CHEK FASTCLIX LANCETS MISC  Self Yes No   Si (four) times a day   GNP Aspirin Low Dose 81 MG EC tablet Past Week Self Yes Yes   Sig: Take 1 tablet (81 mg total) by mouth daily Do not start before ,  .   Jardiance 10 MG TABS Past Week Self Yes Yes   Sig: Take 10 mg by mouth every morning   METOPROLOL SUCCINATE ER PO 2024 Self Yes Yes   Sig: Take 75 mg by mouth every morning   NIFEdipine (PROCARDIA XL) 90 mg 24 hr tablet 2024 Self Yes Yes   Sig: Take 120 mg by mouth every morning Takes 90 mg and 30 mg =120 mg every AM   acetaminophen (TYLENOL) 500 mg tablet Past Week Self No Yes   Sig: Take 1 tablet (500 mg total) by mouth every 6 (six) hours as needed for mild pain 500 mg tablet   Patient taking differently: Take 500 mg by mouth every 8 (eight) hours as needed for mild pain 500 mg tablet   celecoxib (CeleBREX) 100 mg capsule Past Week  No Yes   Sig: Take 1 capsule (100 mg total) by mouth 2 (two) times a day Take with food   clonazePAM (KlonoPIN) 1 mg tablet Past Week Self Yes Yes   Sig: Take 1 mg by mouth 2 (two) times a day & 3rd pill PRN   finasteride (PROSCAR) 5 mg tablet Past Week Self No Yes   Sig: Take 1 tablet (5 mg total) by mouth daily   fluticasone (FLONASE) 50 mcg/act nasal spray Past Week Self Yes Yes   Si spray into each nostril daily as needed   folic acid (FOLVITE) 1 mg tablet Past Week Self Yes Yes   Sig: Take 2,000 mcg by mouth daily   gabapentin (NEURONTIN) 400 mg capsule Past Week  No Yes   Sig: Take 1 capsule (400 mg total) by mouth 2 (two) times a day   hydrOXYzine pamoate (VISTARIL) 50 mg capsule Past Week  Yes Yes   Sig: Take 50 mg by mouth 2 (two) times a day. Indications: Feeling Anxious   ketoconazole (NIZORAL) 2 % shampoo  Self Yes No   Sig: Apply 1 application. topically daily Use as directed   lidocaine (LIDODERM) 5 % Past Week  No Yes   Sig: Apply 1 patch topically over 12 hours daily Remove & Discard patch within 12 hours or as directed by MD   loratadine (CLARITIN) 10 mg tablet Not Taking  Yes No   Sig: Take 10 mg by mouth daily. per med list humberto SALAZAR  Indications: Hayfever   Patient not taking: Reported on 2024   losartan-hydrochlorothiazide (HYZAAR)  100-25 MG per tablet Past Week Self Yes Yes   Sig: Take 1 tablet by mouth every morning   lovastatin (MEVACOR) 20 mg tablet Past Week Self Yes Yes   Sig: Take 20 mg by mouth every morning   melatonin 3 mg Not Taking Self No No   Sig: Take 1 tablet (3 mg total) by mouth daily at bedtime   Patient not taking: Reported on 5/19/2024   metFORMIN (GLUCOPHAGE) 1000 MG tablet Past Week Self Yes Yes   Sig: Take 1,000 mg by mouth 2 (two) times a day with meals    methocarbamol (ROBAXIN) 750 mg tablet Past Week Self No Yes   Sig: Take 1 tablet (750 mg total) by mouth every 8 (eight) hours as needed for muscle spasms   Patient taking differently: Take 750 mg by mouth every 6 (six) hours as needed for muscle spasms   mirtazapine (REMERON) 45 MG tablet Past Week Self Yes Yes   Sig: Take 45 mg by mouth daily at bedtime   patient supplied medication Past Week  Yes Yes   Sig: medical marijuana as needed per latest dci  Indications: .   senna (SENOKOT) 8.6 mg Past Week Self No Yes   Sig: Take 1 tablet (8.6 mg total) by mouth 2 (two) times a day   Patient taking differently: Take 8.6 mg by mouth 2 (two) times a day 2 tabs at bedtime as needed for constipation per latest dci   sertraline (ZOLOFT) 100 mg tablet Past Week Self Yes Yes   Sig: Take 100 mg by mouth every morning   tamsulosin (FLOMAX) 0.4 mg Past Week Self No Yes   Sig: Take 1 capsule (0.4 mg total) by mouth daily with dinner   traMADol (ULTRAM) 50 mg tablet Past Week  Yes Yes   Sig: Take 50 mg by mouth every 6 (six) hours as needed for moderate pain. Indications: Pain      Facility-Administered Medications: None       Past Medical History:   Diagnosis Date    Anxiety     Arthritis     Cancer (HCC)     Chronic back pain     Depression     Diabetes mellitus (HCC)     Hypertension     Liver disease     Mitral valve prolapse     Peripheral neuropathy     Neuropathy    PONV (postoperative nausea and vomiting)     Thyroid disease        Past Surgical History:   Procedure  Laterality Date    COLONOSCOPY      INCISION AND DRAINAGE OF WOUND Left 2022    Procedure: INCISION AND DRAINAGE (I&D) EXTREMITY;  Surgeon: Moustapha Cote DPM;  Location:  MAIN OR;  Service: Podiatry    IR BIOPSY SPINE  2024    IR BIOPSY SPINE  2024    IR PICC PLACEMENT SINGLE LUMEN  2024    JOINT REPLACEMENT Left 2022    Left TSA    DE ARTHRODESIS POSTERIOR/PSTLAT TQ 1NTRSPC LUMBAR Bilateral 2023    Procedure: L1-S1 navigated posterior decompression with instrumented fixation fusion;  Surgeon: James Moraes MD;  Location:  MAIN OR;  Service: Neurosurgery    THYROID SURGERY      remove cancer       Family History   Problem Relation Age of Onset    No Known Problems Father     No Known Problems Mother     Colon cancer Neg Hx      I have reviewed and agree with the history as documented.    E-Cigarette/Vaping    E-Cigarette Use Current Some Day User     Comments medical marijuana - occ      E-Cigarette/Vaping Substances    Nicotine No     THC No     CBD Yes     Flavoring No     Other No     Unknown No      Social History     Tobacco Use    Smoking status: Former     Current packs/day: 0.00     Average packs/day: 0.3 packs/day for 5.9 years (1.5 ttl pk-yrs)     Types: Cigarettes     Start date: 1975     Quit date: 1981     Years since quittin.9    Smokeless tobacco: Never    Tobacco comments:     quit    Vaping Use    Vaping status: Some Days    Substances: CBD   Substance Use Topics    Alcohol use: Not Currently     Alcohol/week: 4.0 - 7.0 standard drinks of alcohol     Types: 1 - 2 Glasses of wine, 2 - 3 Cans of beer, 1 - 2 Shots of liquor per week     Comment: only on special occasions    Drug use: Yes     Frequency: 7.0 times per week     Types: Marijuana     Comment: Medical Marijuana       Review of Systems   Constitutional:  Negative for chills and fever.   HENT:  Negative for ear pain and sore throat.    Eyes:  Negative for pain and visual disturbance.    Respiratory:  Negative for cough and shortness of breath.    Cardiovascular:  Negative for chest pain and palpitations.   Gastrointestinal:  Negative for abdominal pain, bowel incontinence and vomiting.   Genitourinary:  Negative for bladder incontinence, dysuria and hematuria.   Musculoskeletal:  Positive for back pain (radiates down b/l post LE). Negative for arthralgias.   Skin:  Negative for color change and rash.   Neurological:  Negative for tingling, seizures, syncope and paresthesias.   All other systems reviewed and are negative.      Physical Exam  Physical Exam  Vitals and nursing note reviewed.   Constitutional:       General: He is in acute distress (uncomfortable appearing).      Appearance: He is well-developed.   HENT:      Head: Normocephalic and atraumatic.      Mouth/Throat:      Mouth: Mucous membranes are moist.   Eyes:      Extraocular Movements: Extraocular movements intact.      Conjunctiva/sclera: Conjunctivae normal.   Cardiovascular:      Rate and Rhythm: Normal rate and regular rhythm.      Heart sounds: No murmur heard.  Pulmonary:      Effort: Pulmonary effort is normal. No respiratory distress.      Breath sounds: Normal breath sounds.   Abdominal:      Palpations: Abdomen is soft.      Tenderness: There is no abdominal tenderness.   Musculoskeletal:         General: Tenderness (diffuse lower back pain - no skin changes noted) present. No swelling.      Cervical back: Neck supple.   Skin:     General: Skin is warm and dry.      Capillary Refill: Capillary refill takes less than 2 seconds.   Neurological:      General: No focal deficit present.      Mental Status: He is alert and oriented to person, place, and time.      Deep Tendon Reflexes: Reflexes normal.   Psychiatric:         Mood and Affect: Mood normal.         Vital Signs  ED Triage Vitals   Temperature Pulse Respirations Blood Pressure SpO2   05/18/24 2115 05/18/24 2115 05/18/24 2115 05/18/24 2115 05/18/24 2115   98.1 °F  (36.7 °C) 89 18 158/84 99 %      Temp Source Heart Rate Source Patient Position - Orthostatic VS BP Location FiO2 (%)   05/18/24 2115 05/18/24 2115 05/19/24 0045 05/19/24 0045 --   Temporal Monitor Lying Left arm       Pain Score       05/18/24 2146       10 - Worst Possible Pain           Vitals:    05/18/24 2115 05/18/24 2200 05/18/24 2230 05/19/24 0045   BP: 158/84 (!) 218/100 (!) 195/81 (!) 199/84   Pulse: 89 77 87 60   Patient Position - Orthostatic VS:    Lying         Visual Acuity      ED Medications  Medications   oxyCODONE (ROXICODONE) IR tablet 5 mg ( Oral See Alternative 5/19/24 0105)     Or   oxyCODONE (ROXICODONE) immediate release tablet 10 mg (10 mg Oral Given 5/19/24 0105)   HYDROmorphone (DILAUDID) injection 0.5 mg (has no administration in time range)   ondansetron (ZOFRAN) injection 4 mg (has no administration in time range)   celecoxib (CeleBREX) capsule 100 mg (has no administration in time range)   clonazePAM (KlonoPIN) tablet 1 mg (has no administration in time range)   clonazePAM (KlonoPIN) tablet 1 mg (has no administration in time range)   finasteride (PROSCAR) tablet 5 mg (has no administration in time range)   folic acid (FOLVITE) tablet 2,000 mcg (has no administration in time range)   gabapentin (NEURONTIN) capsule 400 mg (has no administration in time range)   aspirin (ECOTRIN LOW STRENGTH) EC tablet 81 mg (has no administration in time range)   hydrOXYzine HCL (ATARAX) tablet 50 mg (has no administration in time range)   losartan (COZAAR) tablet 100 mg (has no administration in time range)     And   hydroCHLOROthiazide tablet 25 mg (has no administration in time range)   pravastatin (PRAVACHOL) tablet 20 mg (has no administration in time range)   metoprolol succinate (TOPROL-XL) 24 hr tablet 75 mg (has no administration in time range)   mirtazapine (REMERON) tablet 45 mg (has no administration in time range)   sertraline (ZOLOFT) tablet 100 mg (has no administration in time range)    tamsulosin (FLOMAX) capsule 0.4 mg (has no administration in time range)   methocarbamol (ROBAXIN) tablet 750 mg (has no administration in time range)   acetaminophen (TYLENOL) tablet 975 mg (has no administration in time range)   senna (SENOKOT) tablet 8.6 mg (has no administration in time range)   aluminum-magnesium hydroxide-simethicone (MAALOX) oral suspension 30 mL (has no administration in time range)   enoxaparin (LOVENOX) subcutaneous injection 40 mg (has no administration in time range)   insulin lispro (HumALOG/ADMELOG) 100 units/mL subcutaneous injection 1-6 Units (has no administration in time range)   insulin lispro (HumALOG/ADMELOG) 100 units/mL subcutaneous injection 1-5 Units (has no administration in time range)   NIFEdipine (PROCARDIA XL) 24 hr tablet 120 mg (has no administration in time range)   vancomycin (VANCOCIN) 1750 mg in sodium chloride 0.9% 500 mL IVPB (has no administration in time range)   vancomycin (VANCOCIN) IVPB (premix in dextrose) 1,000 mg 200 mL (has no administration in time range)   sodium chloride 0.9 % bolus 1,000 mL (0 mL Intravenous Stopped 5/18/24 2323)   ondansetron (ZOFRAN) injection 4 mg (4 mg Intravenous Given 5/18/24 2144)   ketorolac (TORADOL) injection 15 mg (15 mg Intravenous Given 5/18/24 2146)   diazepam (VALIUM) injection 5 mg (5 mg Intravenous Given 5/18/24 2144)   acetaminophen (TYLENOL) tablet 650 mg (650 mg Oral Given 5/18/24 2147)   HYDROmorphone (DILAUDID) injection 0.5 mg (0.5 mg Intravenous Given 5/18/24 2227)   potassium chloride (Klor-Con M20) CR tablet 40 mEq (40 mEq Oral Given 5/18/24 2320)   sodium chloride 0.9 % bolus 1,000 mL (1,000 mL Intravenous New Bag 5/18/24 2320)   ondansetron (ZOFRAN) injection 4 mg (4 mg Intravenous Given 5/19/24 0138)       Diagnostic Studies  Results Reviewed       Procedure Component Value Units Date/Time    Lactic acid 2 Hours [[id number]]  (Normal) Collected: 05/19/24 0017    Lab Status: Final result Specimen: Blood  from Arm, Left Updated: 05/19/24 0039     LACTIC ACID 1.0 mmol/L     Narrative:      Result may be elevated if tourniquet was used during collection.    Blood culture #1 [[id number]] Collected: 05/18/24 2320    Lab Status: In process Specimen: Blood from Hand, Left Updated: 05/18/24 2348    UA w Reflex to Microscopic w Reflex to Culture [[id number]]  (Abnormal) Collected: 05/18/24 2145    Lab Status: Final result Specimen: Urine, Other Updated: 05/18/24 2225     Color, UA Light Yellow     Clarity, UA Clear     Specific Gravity, UA 1.015     pH, UA 7.5     Leukocytes, UA Negative     Nitrite, UA Negative     Protein,  (2+) mg/dl      Glucose,  (3/10%) mg/dl      Ketones, UA Trace mg/dl      Urobilinogen, UA <2.0 mg/dl      Bilirubin, UA Negative     Occult Blood, UA Trace    Procalcitonin [[id number]]  (Normal) Collected: 05/18/24 2140    Lab Status: Final result Specimen: Blood from Arm, Left Updated: 05/18/24 2220     Procalcitonin 0.07 ng/ml     Urine Microscopic [[id number]] Collected: 05/18/24 2145    Lab Status: Final result Specimen: Urine, Other Updated: 05/18/24 2215     RBC, UA 2-4 /hpf      WBC, UA 0-1 /hpf      Epithelial Cells Occasional /hpf      Bacteria, UA Occasional /hpf      OTHER OBSERVATIONS Yeast Cells Present    Lactic acid, plasma (w/reflex if result > 2.0) [[id number]]  (Abnormal) Collected: 05/18/24 2140    Lab Status: Final result Specimen: Blood from Arm, Left Updated: 05/18/24 2208     LACTIC ACID 2.8 mmol/L     Narrative:      Result may be elevated if tourniquet was used during collection.    Comprehensive metabolic panel [[id number]]  (Abnormal) Collected: 05/18/24 2140    Lab Status: Final result Specimen: Blood from Arm, Left Updated: 05/18/24 2207     Sodium 136 mmol/L      Potassium 3.2 mmol/L      Chloride 95 mmol/L      CO2 28 mmol/L      ANION GAP 13 mmol/L      BUN 13 mg/dL      Creatinine 0.78 mg/dL      Glucose 170 mg/dL      Calcium 9.9 mg/dL      AST 25 U/L      ALT  17 U/L      Alkaline Phosphatase 99 U/L      Total Protein 8.5 g/dL      Albumin 4.8 g/dL      Total Bilirubin 0.65 mg/dL      eGFR 94 ml/min/1.73sq m     Narrative:      National Kid[employer] Disease Foundation guidelines for Chronic Kidney Disease (CKD):     Stage 1 with normal or high GFR (GFR > 90 mL/min/1.73 square meters)    Stage 2 Mild CKD (GFR = 60-89 mL/min/1.73 square meters)    Stage 3A Moderate CKD (GFR = 45-59 mL/min/1.73 square meters)    Stage 3B Moderate CKD (GFR = 30-44 mL/min/1.73 square meters)    Stage 4 Severe CKD (GFR = 15-29 mL/min/1.73 square meters)    Stage 5 End Stage CKD (GFR <15 mL/min/1.73 square meters)  Note: GFR calculation is accurate only with a steady state creatinine    CBC and differential [[id number]]  (Abnormal) Collected: 05/18/24 2140    Lab Status: Final result Specimen: Blood from Arm, Left Updated: 05/18/24 2153     WBC 14.60 Thousand/uL      RBC 5.03 Million/uL      Hemoglobin 12.3 g/dL      Hematocrit 40.5 %      MCV 81 fL      MCH 24.5 pg      MCHC 30.4 g/dL      RDW 16.7 %      MPV 9.7 fL      Platelets 459 Thousands/uL      nRBC 0 /100 WBCs      Segmented % 80 %      Immature Grans % 1 %      Lymphocytes % 12 %      Monocytes % 7 %      Eosinophils Relative 0 %      Basophils Relative 0 %      Absolute Neutrophils 11.57 Thousands/µL      Absolute Immature Grans 0.08 Thousand/uL      Absolute Lymphocytes 1.79 Thousands/µL      Absolute Monocytes 1.08 Thousand/µL      Eosinophils Absolute 0.02 Thousand/µL      Basophils Absolute 0.06 Thousands/µL     Blood culture #2 [[id number]] Collected: 05/18/24 2140    Lab Status: In process Specimen: Blood from Arm, Left Updated: 05/18/24 2150                   CT lumbar spine without contrast    (Results Pending)   MRI inpatient order    (Results Pending)              Procedures  Procedures         ED Course  ED Course as of 05/19/24 0409   Sat May 18, 2024   2113 Pt seen and examined. 67 yo male presents with acute on chronic pain  secondary to low back pain x past week.  Denies recent trauma, is clearly uncomfortable and unable to find position of comfort.  Was seen at [facility] yesterday for same - they did blood work and sent him home on tramadol which is not touching his pain.  He denies bowel/bladder, saddle anesthesia, reflexes are intact.  Pt is s/p lumbar postlaminectomy syndrome-fusion T12-S1 by Dr Moraes April 11, 2023 due to lumbar foraminal stenosis and lumbar radiculopathy.  He was admitted to the hospital on January 24, 2024 for osteomyelitis and discitis at L5-S1.  He was followed by I&D and treated with IV vancomycin and completed antibiotic treatment on March 8, 2024. . Denies fever/chills.  With pain out of proportion, will send lactate, blood cx x 2 and procal with basic labs.   2225 Pt still in pain - dilaudid 0.5mg ordered. WBC 14.6, Lact 2.8, IVF infusing and 2nd L ordered.  K 3.2 - oral kdur ordered.   2238 IV vanco ordered.  On call APC Marchetti TT NSx.                                             Medical Decision Making  Amount and/or Complexity of Data Reviewed  Labs: ordered.  Radiology: ordered.    Risk  OTC drugs.  Prescription drug management.  Decision regarding hospitalization.             Disposition  Final diagnoses:   Acute exacerbation of chronic low back pain   Leukocytosis   Elevated lactic acid level   Personal history of osteomyelitis     Time reflects when diagnosis was documented in both MDM as applicable and the Disposition within this note       Time User Action Codes Description Comment    5/18/2024 10:52 PM Kassie Pham Add [M54.50,  G89.29] Acute exacerbation of chronic low back pain     5/18/2024 10:52 PM Kassie Pham Add [D72.829] Leukocytosis     5/18/2024 10:53 PM Kassie Pham Add [R79.89] Elevated lactic acid level     5/18/2024 10:53 PM Kassie Pham [Z87.39] Personal history of osteomyelitis     5/19/2024  3:15 AM Marisel Delgado Add [M46.47] Discitis of lumbosacral  region           ED Disposition       ED Disposition   Admit    Condition   Stable    Date/Time   Sat May 18, 2024 10:52 PM    Comment   Case was discussed with Marisel Delgado and the patient's admission status was agreed to be Admission Status: inpatient status to the service of Dr. Arriaza .               Follow-up Information    None         Current Discharge Medication List        CONTINUE these medications which have NOT CHANGED    Details   acetaminophen (TYLENOL) 500 mg tablet Take 1 tablet (500 mg total) by mouth every 6 (six) hours as needed for mild pain 500 mg tablet  Refills: 0    Associated Diagnoses: S/P lumbar fusion      celecoxib (CeleBREX) 100 mg capsule Take 1 capsule (100 mg total) by mouth 2 (two) times a day Take with food  Qty: 60 capsule, Refills: 3    Associated Diagnoses: Postlaminectomy syndrome, lumbar      clonazePAM (KlonoPIN) 1 mg tablet Take 1 mg by mouth 2 (two) times a day & 3rd pill PRN      finasteride (PROSCAR) 5 mg tablet Take 1 tablet (5 mg total) by mouth daily  Qty: 90 tablet, Refills: 3    Associated Diagnoses: Urinary retention      fluticasone (FLONASE) 50 mcg/act nasal spray 1 spray into each nostril daily as needed      folic acid (FOLVITE) 1 mg tablet Take 2,000 mcg by mouth daily  Refills: 6      gabapentin (NEURONTIN) 400 mg capsule Take 1 capsule (400 mg total) by mouth 2 (two) times a day  Qty: 60 capsule, Refills: 5    Associated Diagnoses: Postlaminectomy syndrome, lumbar; Lumbar radiculopathy      GNP Aspirin Low Dose 81 MG EC tablet Take 1 tablet (81 mg total) by mouth daily Do not start before April 25, 2023.  Refills: 0      hydrOXYzine pamoate (VISTARIL) 50 mg capsule Take 50 mg by mouth 2 (two) times a day. Indications: Feeling Anxious      Jardiance 10 MG TABS Take 10 mg by mouth every morning      lidocaine (LIDODERM) 5 % Apply 1 patch topically over 12 hours daily Remove & Discard patch within 12 hours or as directed by MD  Qty: 15 patch, Refills: 0     Associated Diagnoses: S/P lumbar fusion      losartan-hydrochlorothiazide (HYZAAR) 100-25 MG per tablet Take 1 tablet by mouth every morning  Refills: 0      lovastatin (MEVACOR) 20 mg tablet Take 20 mg by mouth every morning  Refills: 3      metFORMIN (GLUCOPHAGE) 1000 MG tablet Take 1,000 mg by mouth 2 (two) times a day with meals       methocarbamol (ROBAXIN) 750 mg tablet Take 1 tablet (750 mg total) by mouth every 8 (eight) hours as needed for muscle spasms  Qty: 15 tablet, Refills: 0    Associated Diagnoses: S/P lumbar fusion      METOPROLOL SUCCINATE ER PO Take 75 mg by mouth every morning      mirtazapine (REMERON) 45 MG tablet Take 45 mg by mouth daily at bedtime  Refills: 1      NIFEdipine (PROCARDIA XL) 90 mg 24 hr tablet Take 120 mg by mouth every morning Takes 90 mg and 30 mg =120 mg every AM  Refills: 3      patient supplied medication medical marijuana as needed per latest dci  Indications: .      senna (SENOKOT) 8.6 mg Take 1 tablet (8.6 mg total) by mouth 2 (two) times a day  Qty: 60 tablet, Refills: 0    Associated Diagnoses: Chronic bilateral low back pain with bilateral sciatica      sertraline (ZOLOFT) 100 mg tablet Take 100 mg by mouth every morning      tamsulosin (FLOMAX) 0.4 mg Take 1 capsule (0.4 mg total) by mouth daily with dinner  Qty: 90 capsule, Refills: 3    Associated Diagnoses: S/P lumbar fusion      traMADol (ULTRAM) 50 mg tablet Take 50 mg by mouth every 6 (six) hours as needed for moderate pain. Indications: Pain      ACCU-CHEK FABIANO PLUS test strip 4 (four) times a day  Refills: 1      ACCU-CHEK FASTCLIX LANCETS MISC 4 (four) times a day  Refills: 1      ketoconazole (NIZORAL) 2 % shampoo Apply 1 application. topically daily Use as directed  Refills: 6      loratadine (CLARITIN) 10 mg tablet Take 10 mg by mouth daily. per med list humberto  PC  Indications: Hayfever      melatonin 3 mg Take 1 tablet (3 mg total) by mouth daily at bedtime  Qty: 30 tablet, Refills: 0     Associated Diagnoses: Anxiety and depression             No discharge procedures on file.    PDMP Review         Value Time User    PDMP Reviewed  Yes 2/6/2024 12:43 PM Sintia Soto PA-C            ED Provider  Electronically Signed by             Kassie Pham DO  05/19/24 0404

## 2024-05-19 NOTE — ASSESSMENT & PLAN NOTE
Status post partial thyroidectomy  Not maintained on Synthroid as levels have been stable  Follows with [facility], remains in remission

## 2024-05-19 NOTE — PLAN OF CARE
Problem: Prexisting or High Potential for Compromised Skin Integrity  Goal: Skin integrity is maintained or improved  Description: INTERVENTIONS:  - Identify patients at risk for skin breakdown  - Assess and monitor skin integrity  - Assess and monitor nutrition and hydration status  - Monitor labs   - Assess for incontinence   - Turn and reposition patient  - Assist with mobility/ambulation  - Relieve pressure over bony prominences  - Avoid friction and shearing  - Provide appropriate hygiene as needed including keeping skin clean and dry  - Evaluate need for skin moisturizer/barrier cream  - Collaborate with interdisciplinary team   - Patient/family teaching  - Consider wound care consult   Outcome: Progressing     Problem: PAIN - ADULT  Goal: Verbalizes/displays adequate comfort level or baseline comfort level  Description: Interventions:  - Encourage patient to monitor pain and request assistance  - Assess pain using appropriate pain scale  - Administer analgesics based on type and severity of pain and evaluate response  - Implement non-pharmacological measures as appropriate and evaluate response  - Consider cultural and social influences on pain and pain management  - Notify physician/advanced practitioner if interventions unsuccessful or patient reports new pain  Outcome: Progressing     Problem: SAFETY ADULT  Goal: Patient will remain free of falls  Description: INTERVENTIONS:  - Educate patient/family on patient safety including physical limitations  - Instruct patient to call for assistance with activity   - Consult OT/PT to assist with strengthening/mobility   - Keep Call bell within reach  - Keep bed low and locked with side rails adjusted as appropriate  - Keep care items and personal belongings within reach  - Initiate and maintain comfort rounds  - Make Fall Risk Sign visible to staff  - Offer Toileting every 2 Hours, in advance of need  - Obtain necessary fall risk management equipment: nonskid  footwear  - Apply yellow socks and bracelet for high fall risk patients  - Consider moving patient to room near nurses station  Outcome: Progressing     Problem: DISCHARGE PLANNING  Goal: Discharge to home or other facility with appropriate resources  Description: INTERVENTIONS:  - Identify barriers to discharge w/patient and caregiver  - Arrange for needed discharge resources and transportation as appropriate  - Identify discharge learning needs (meds, wound care, etc.)  - Arrange for interpretive services to assist at discharge as needed  - Refer to Case Management Department for coordinating discharge planning if the patient needs post-hospital services based on physician/advanced practitioner order or complex needs related to functional status, cognitive ability, or social support system  Outcome: Progressing     Problem: Knowledge Deficit  Goal: Patient/family/caregiver demonstrates understanding of disease process, treatment plan, medications, and discharge instructions  Description: Complete learning assessment and assess knowledge base.  Interventions:  - Provide teaching at level of understanding  - Provide teaching via preferred learning methods  Outcome: Progressing     Problem: METABOLIC, FLUID AND ELECTROLYTES - ADULT  Goal: Fluid balance maintained  Description: INTERVENTIONS:  - Monitor labs   - Monitor I/O and WT  - Instruct patient on fluid and nutrition as appropriate  - Assess for signs & symptoms of volume excess or deficit  Outcome: Progressing  Goal: Glucose maintained within target range  Description: INTERVENTIONS:  - Monitor Blood Glucose as ordered  - Assess for signs and symptoms of hyperglycemia and hypoglycemia  - Administer ordered medications to maintain glucose within target range  - Assess nutritional intake and initiate nutrition service referral as needed  Outcome: Progressing     Problem: MUSCULOSKELETAL - ADULT  Goal: Maintain or return mobility to safest level of  function  Description: INTERVENTIONS:  - Assess patient's ability to carry out ADLs; assess patient's baseline for ADL function and identify physical deficits which impact ability to perform ADLs (bathing, care of mouth/teeth, toileting, grooming, dressing, etc.)  - Assess/evaluate cause of self-care deficits   - Assess range of motion  - Assess patient's mobility  - Assess patient's need for assistive devices and provide as appropriate  - Encourage maximum independence but intervene and supervise when necessary  - Involve family in performance of ADLs  - Assess for home care needs following discharge   - Consider OT consult to assist with ADL evaluation and planning for discharge  - Provide patient education as appropriate  Outcome: Progressing

## 2024-05-19 NOTE — H&P
"[facility]  H&P  Name: Juan San 68 y.o. male I MRN: [id number]  Unit/Bed#: -01 I Date of Admission: 5/18/2024   Date of Service: 5/19/2024 I Hospital Day: 1      Assessment & Plan   * Acute on chronic bilateral low back pain with sciatica  Assessment & Plan  Presents to the ED with worsening lower back pain for the last week, seen in [facility] on 5/17 and discharged home  History of discitis in January 2024 requiring IV antibiotics and IR biopsy that showed no growth  - completed IV vancomycin and cefepime outpatient on 3/08/2024  History of lumbar fusion April 2023  CT L-spine: \"There is a spinal fusion spanning T12-L 1 levels.  There is a retrolisthesis of L1 on L2 and a similar retrolisthesis of L5 on S1.  Generalized sclerosis of the lumbar, sacral, and pelvic marrow.  Disc degeneration and/or discitis at that L1-L2 and L5-S1 levels.  These changes are not necessarily acute.  No paraspinous mass or hematoma\"  received multiple pain medications in the ED, however continued to be in pain  ED spoke with neurosurgery-recommend admission for MRI and pain control  MRI lumbar spine ordered  Pain control: Oxy 5/10 as needed and Dilaudid for breakthrough pain, Tylenol 975 every 8 hours  Ordered vancomycin until MRI obtained due to questionable discitis on CT scan, however patient without any fevers or chills    Discitis of lumbosacral region  Assessment & Plan  Admission in January 2024 requiring IR biopsy and IV vancomycin and cefepime  Neurosurgery consulted due to CT findings on admit  Continue vancomycin until MRI performed    Leukocytosis  Assessment & Plan  WBC at 14.60K on admit   Unclear if truly with infection, CT with possible discitis versus degeneration at site of prior discitis  High suspicion that leukocytosis is actually secondary to viral gastroenteritis, will continue IV vancomycin for now until MRI lumbar spine performed to determine if discitis is " "acute  Trend CBC and fever curve    Viral gastroenteritis  Assessment & Plan  Developed nausea and emesis approximately 3 days ago with generalized abdominal pain  Wife with similar symptoms  Suspect viral gastroenteritis  Continue supportive care with antiemetics and IVF    Lactic acidosis  Assessment & Plan  Lactic acid at 2.8, improved status post IVF to 1.0  Lactic acidosis secondary to dehydration from GI losses from viral gastroenteritis  Continue IVF    Type 2 diabetes mellitus with hyperglycemia, without long-term current use of insulin (HCC)  Assessment & Plan  Lab Results   Component Value Date    HGBA1C 8.2 (H) 01/24/2024       No results for input(s): \"POCGLU\" in the last 72 hours.    Blood Sugar Average: Last 72 hrs:  Home regimen: Metformin 1000 mg twice daily and Jardiance 10 Mg daily  Placed on SSI while inpatient  Update hemoglobin A1c    Hypertension  Assessment & Plan  Home regimen: Hyzaar 100-25 Mg daily, nifedipine 120 Mg daily, metoprolol 75 Mg daily  BP elevated, suspect multifactorial as patient had not taken all of his BP meds yet and he was in pain  Will continue home medications and trend BP per unit protocol    Cirrhosis, alcoholic (HCC)  Assessment & Plan  Managed by PCP  Continue outpatient follow-up    Thyroid cancer (HCC)  Assessment & Plan  Status post partial thyroidectomy  Not maintained on Synthroid as levels have been stable  Follows with Horacio Jackson, remains in remission    Benign prostatic hyperplasia with urinary retention  Assessment & Plan  Continue home Flomax and finasteride           VTE Pharmacologic Prophylaxis: VTE Score: 3 Moderate Risk (Score 3-4) - Pharmacological DVT Prophylaxis Ordered: enoxaparin (Lovenox).  Code Status: Level 1 - Full Code   Discussion with family: Updated  (wife) via phone.    Anticipated Length of Stay: Patient will be admitted on an inpatient basis with an anticipated length of stay of greater than 2 midnights secondary to acute " "on chronic low back pain, history of discitis.    Chief Complaint: \"My back pain worsened\"    History of Present Illness:  Juan San is a 68 y.o. male with a PMH of chronic low back pain, discitis January 2024, NIDDM 2, HTN, thyroid cancer status postresection, and BPH who presents with acute on chronic back pain that worsened in the last week.  Pain is in the central lumbar area and radiates to bilateral legs, similar to chronic back pain.  Also endorses NV. No fever or chills.     Review of Systems:  Review of Systems   Constitutional:  Negative for chills and fever.   HENT:  Negative for congestion.    Respiratory:  Negative for cough and shortness of breath.    Cardiovascular:  Negative for chest pain and leg swelling.   Gastrointestinal:  Positive for abdominal pain, nausea and vomiting.   Musculoskeletal:  Positive for back pain.   All other systems reviewed and are negative.      Past Medical and Surgical History:   Past Medical History:   Diagnosis Date    Anxiety     Arthritis     Cancer (HCC)     Chronic back pain     Depression     Diabetes mellitus (HCC)     Hypertension     Liver disease     Mitral valve prolapse     Peripheral neuropathy     Neuropathy    PONV (postoperative nausea and vomiting)     Thyroid disease        Past Surgical History:   Procedure Laterality Date    COLONOSCOPY      INCISION AND DRAINAGE OF WOUND Left 08/12/2022    Procedure: INCISION AND DRAINAGE (I&D) EXTREMITY;  Surgeon: Moustapha Cote DPM;  Location:  MAIN OR;  Service: Podiatry    IR BIOPSY SPINE  1/30/2024    IR BIOPSY SPINE  2/1/2024    IR PICC PLACEMENT SINGLE LUMEN  2/6/2024    JOINT REPLACEMENT Left 03/11/2022    Left TSA    IL ARTHRODESIS POSTERIOR/PSTLAT TQ 1NTRSPC LUMBAR Bilateral 04/11/2023    Procedure: L1-S1 navigated posterior decompression with instrumented fixation fusion;  Surgeon: James Moraes MD;  Location:  MAIN OR;  Service: Neurosurgery    THYROID SURGERY  2021    remove cancer "       Meds/Allergies:  Prior to Admission medications    Medication Sig Start Date End Date Taking? Authorizing Provider   acetaminophen (TYLENOL) 500 mg tablet Take 1 tablet (500 mg total) by mouth every 6 (six) hours as needed for mild pain 500 mg tablet  Patient taking differently: Take 500 mg by mouth every 8 (eight) hours as needed for mild pain 500 mg tablet 4/22/23  Yes Katja Oshea PA-C   celecoxib (CeleBREX) 100 mg capsule Take 1 capsule (100 mg total) by mouth 2 (two) times a day Take with food 3/4/24  Yes RUIZ Logan   clonazePAM (KlonoPIN) 1 mg tablet Take 1 mg by mouth 2 (two) times a day & 3rd pill PRN 10/24/22  Yes Historical Provider, MD   finasteride (PROSCAR) 5 mg tablet Take 1 tablet (5 mg total) by mouth daily 7/18/23  Yes Benedicto Rice MD   fluticasone (FLONASE) 50 mcg/act nasal spray 1 spray into each nostril daily as needed   Yes Historical Provider, MD   folic acid (FOLVITE) 1 mg tablet Take 2,000 mcg by mouth daily 12/17/18  Yes Historical Provider, MD   gabapentin (NEURONTIN) 400 mg capsule Take 1 capsule (400 mg total) by mouth 2 (two) times a day 3/4/24  Yes RUIZ Logan Aspirin Low Dose 81 MG EC tablet Take 1 tablet (81 mg total) by mouth daily Do not start before April 25, 2023. 4/25/23  Yes Katja Oshea PA-C   hydrOXYzine pamoate (VISTARIL) 50 mg capsule Take 50 mg by mouth 2 (two) times a day. Indications: Feeling Anxious   Yes Historical Provider, MD   Jardiance 10 MG TABS Take 10 mg by mouth every morning 7/20/22  Yes Historical Provider, MD   lidocaine (LIDODERM) 5 % Apply 1 patch topically over 12 hours daily Remove & Discard patch within 12 hours or as directed by MD 2/27/24  Yes Harinder Marin MD   losartan-hydrochlorothiazide (HYZAAR) 100-25 MG per tablet Take 1 tablet by mouth every morning 12/17/18  Yes Historical Provider, MD   lovastatin (MEVACOR) 20 mg tablet Take 20 mg by mouth every morning 12/17/18  Yes Historical  Provider, MD   metFORMIN (GLUCOPHAGE) 1000 MG tablet Take 1,000 mg by mouth 2 (two) times a day with meals  1/2/19  Yes Historical Provider, MD   methocarbamol (ROBAXIN) 750 mg tablet Take 1 tablet (750 mg total) by mouth every 8 (eight) hours as needed for muscle spasms  Patient taking differently: Take 750 mg by mouth every 6 (six) hours as needed for muscle spasms 10/11/23  Yes Leah He MD   METOPROLOL SUCCINATE ER PO Take 75 mg by mouth every morning   Yes Sabra Magaña DO   mirtazapine (REMERON) 45 MG tablet Take 45 mg by mouth daily at bedtime 12/11/18  Yes Historical Provider, MD   NIFEdipine (PROCARDIA XL) 90 mg 24 hr tablet Take 120 mg by mouth every morning Takes 90 mg and 30 mg =120 mg every AM 12/17/18  Yes Historical Provider, MD   patient supplied medication medical marijuana as needed per latest dci  Indications: .   Yes Historical Provider, MD   senna (SENOKOT) 8.6 mg Take 1 tablet (8.6 mg total) by mouth 2 (two) times a day  Patient taking differently: Take 8.6 mg by mouth 2 (two) times a day 2 tabs at bedtime as needed for constipation per latest dci 2/6/24  Yes Sintia Soto PA-C   sertraline (ZOLOFT) 100 mg tablet Take 100 mg by mouth every morning   Yes Historical Provider, MD   tamsulosin (FLOMAX) 0.4 mg Take 1 capsule (0.4 mg total) by mouth daily with dinner 7/18/23  Yes Benedicto Rice MD   traMADol (ULTRAM) 50 mg tablet Take 50 mg by mouth every 6 (six) hours as needed for moderate pain. Indications: Pain   Yes Historical Provider, MD   ACCU-ELLIE FABIANO PLUS test strip 4 (four) times a day 12/17/18   Historical Provider, MD ANDERSON FASTCLIX LANCETS MISC 4 (four) times a day 12/22/18   Historical Provider, MD   ketoconazole (NIZORAL) 2 % shampoo Apply 1 application. topically daily Use as directed 10/10/18   Historical Provider, MD   loratadine (CLARITIN) 10 mg tablet Take 10 mg by mouth daily. per med list humberto SALAZAR  Indications: Hayfever  Patient not  taking: Reported on 5/19/2024    Historical Provider, MD   melatonin 3 mg Take 1 tablet (3 mg total) by mouth daily at bedtime  Patient not taking: Reported on 5/19/2024 10/11/23   Leah He MD   ARIPiprazole (ABILIFY) 30 mg tablet Take 30 mg by mouth daily at bedtime  Patient not taking: Reported on 7/29/2022 12/17/18 8/9/22  Historical Provider, MD   Azelastine HCl 137 MCG/SPRAY SOLN 137 mcg into each nostril 2 (two) times a day as needed (allergies). Indications: Hayfever  Patient not taking: Reported on 5/19/2024 5/19/24  Historical Provider, MD   cefepime (MAXIPIME) 2 g injection Inject 2 g into a catheter in a vein 2 (two) times a day. 2oml IVP over 3-5 minutes  pt not using   Indications: Bacteria in the Blood  Patient not taking: Reported on 5/19/2024 5/19/24  Antwon Shetty MD   cetirizine (ZyrTEC) 10 mg tablet Take 10 mg by mouth daily. PRN  Indications: Hayfever  Patient not taking: Reported on 5/19/2024 5/19/24  Historical Provider, MD   CHOLECALCIFEROL PO Take 5,000 Units by mouth daily  Patient not taking: Reported on 5/19/2024 5/19/24  Historical Provider, MD   DIGOX 250 MCG tablet TAKE 1 TABLET BY MOUTH EVERY DAY BUT DO NOT TAKE ON SUNDAY OR WEDNESDAY  Patient not taking: Reported on 7/29/2022 12/17/18 8/9/22  Historical Provider, MD   gabapentin (NEURONTIN) 400 mg capsule Take 1 capsule (400 mg total) by mouth daily at bedtime  Patient not taking: Reported on 4/3/2024 10/12/23 5/19/24  Leah He MD   glipiZIDE (GLUCOTROL XL) 10 mg 24 hr tablet Take 10 mg by mouth 2 (two) times a day. Indications: Type 2 Diabetes 9/8/22 9/8/22  Sabra Magaña DO   Heparin Na, Pork, Lock Flsh PF 10 units/mL 10 Units by Intracatheter route 2 (two) times a day. Flush PICC 2x daily after IV antibiotic administration pt not using  Indications: Prevention of Closure of Indwelling Catheter  Patient not taking: Reported on 5/19/2024 5/19/24  Antwon Shetty MD   HUMULIN N 100 UNIT/ML  subcutaneous injection INJECT 40 UNITS IN THE MORNING AND 6 UNITS in THE IN THE EVENING AS DIRECTED  Patient not taking: Reported on 8/9/2022 12/27/18 8/9/22  Historical Provider, MD   ondansetron (ZOFRAN) 4 mg tablet Take 1 tablet (4 mg total) by mouth every 6 (six) hours as needed for nausea or vomiting  Patient not taking: Reported on 7/29/2022 4/29/22 8/9/22  Stefany Urbina DO   propranolol (INDERAL LA) 160 mg Take 160 mg by mouth daily 12/17/18 9/16/22  Historical Provider, MD   sodium chloride (Monoject Sodium Chloride Flush) 0.9 % SOLN Inject 10 mL into a catheter in a vein 2 (two) times a day. Use before and after IV antibiotic administration  pt not using   Indications: FLUSHING  Patient not taking: Reported on 5/19/2024 5/19/24  Antwon Shetty MD   vancomycin (VANCOCIN) 1 g Inject 1 g into a catheter in a vein 2 (two) times a day. 100ml  easy pump    Administer over 1 hour  pt not using  Indications: Infection caused by Bacteria  Patient not taking: Reported on 5/19/2024 5/19/24  Antwon Shetty MD     I have reviewed home medications with patient personally.    Allergies:   Allergies   Allergen Reactions    Abilify [Aripiprazole] Tremor     Shaking      Cephalexin Diarrhea    Molds & Smuts Allergic Rhinitis    Pregabalin Tremor     Lyrica - shaking feeling       Social History:  Marital Status: /Civil Union   Occupation: Retired  Patient Pre-hospital Living Situation: Home, With spouse  Patient Pre-hospital Level of Mobility: walks  Patient Pre-hospital Diet Restrictions: None  Substance Use History:   Social History     Substance and Sexual Activity   Alcohol Use Not Currently    Alcohol/week: 4.0 - 7.0 standard drinks of alcohol    Types: 1 - 2 Glasses of wine, 2 - 3 Cans of beer, 1 - 2 Shots of liquor per week    Comment: only on special occasions     Social History     Tobacco Use   Smoking Status Former    Current packs/day: 0.00    Average packs/day: 0.3 packs/day for 5.9 years (1.5 ttl  "pk-yrs)    Types: Cigarettes    Start date: 1975    Quit date: 1981    Years since quittin.9   Smokeless Tobacco Never   Tobacco Comments    quit      Social History     Substance and Sexual Activity   Drug Use Yes    Frequency: 7.0 times per week    Types: Marijuana    Comment: Medical Marijuana       Family History:  Family History   Problem Relation Age of Onset    No Known Problems Father     No Known Problems Mother     Colon cancer Neg Hx        Physical Exam:     Vitals:   Blood Pressure: (!) 199/84 (24)  Pulse: 60 (24)  Temperature: (!) 97 °F (36.1 °C) (24)  Temp Source: Temporal (24)  Respirations: 18 (24)  Height: 5' 9\" (175.3 cm) (24)  Weight - Scale: 77.8 kg (171 lb 8.3 oz) (24)  SpO2: 99 % (24)    Physical Exam  Vitals and nursing note reviewed.   Constitutional:       Appearance: Normal appearance.      Comments: Attempted to see patient and was sleeping soundly, on reevaluation he appeared uncomfortable when he was awake and indicated that his pain had increased again.  He is conversational and cooperative   HENT:      Head: Normocephalic.      Mouth/Throat:      Mouth: Mucous membranes are moist.   Eyes:      Conjunctiva/sclera: Conjunctivae normal.   Cardiovascular:      Rate and Rhythm: Normal rate and regular rhythm.   Pulmonary:      Effort: Pulmonary effort is normal.      Breath sounds: Normal breath sounds.   Abdominal:      General: Abdomen is flat.      Palpations: Abdomen is soft.      Tenderness: There is no abdominal tenderness.   Musculoskeletal:         General: Normal range of motion.      Cervical back: Normal range of motion.      Right lower leg: No edema.      Left lower leg: No edema.      Comments: Surgical scar midline of back that has healed well.  Mild tenderness to palpation over the midline spinous process of the lumbar spine.   Skin:     General: Skin is warm and dry. "      Coloration: Skin is not pale.   Neurological:      General: No focal deficit present.      Mental Status: He is alert.      Sensory: No sensory deficit.      Motor: No weakness.      Comments: Moving bilateral lower extremities equally.  No sensory deficit to bilateral lower extremities.   Psychiatric:         Mood and Affect: Mood normal.         Thought Content: Thought content normal.          Additional Data:     Lab Results:  Results from last 7 days   Lab Units 05/18/24  2140   WBC Thousand/uL 14.60*   HEMOGLOBIN g/dL 12.3   HEMATOCRIT % 40.5   PLATELETS Thousands/uL 459*   SEGS PCT % 80*   LYMPHO PCT % 12*   MONO PCT % 7   EOS PCT % 0     Results from last 7 days   Lab Units 05/18/24  2140   SODIUM mmol/L 136   POTASSIUM mmol/L 3.2*   CHLORIDE mmol/L 95*   CO2 mmol/L 28   BUN mg/dL 13   CREATININE mg/dL 0.78   ANION GAP mmol/L 13   CALCIUM mg/dL 9.9   ALBUMIN g/dL 4.8   TOTAL BILIRUBIN mg/dL 0.65   ALK PHOS U/L 99   ALT U/L 17   AST U/L 25   GLUCOSE RANDOM mg/dL 170*                 Results from last 7 days   Lab Units 05/19/24  0017 05/18/24  2140   LACTIC ACID mmol/L 1.0 2.8*   PROCALCITONIN ng/ml  --  0.07       Lines/Drains:  Invasive Devices       Peripheral Intravenous Line  Duration             Peripheral IV 05/18/24 Left Antecubital <1 day                        Imaging: Reviewed radiology reports from this admission including: Reviewed V rad read of CT lumbar spine, impression above  CT lumbar spine without contrast    (Results Pending)   MRI inpatient order    (Results Pending)       EKG and Other Studies Reviewed on Admission:   EKG:  Personal interpretation NSR with PACs.  No acute ischemia.  Normal QTc..    ** Please Note: This note has been constructed using a voice recognition system. **

## 2024-05-19 NOTE — TELEMEDICINE
e-Consult (IPC)   Inpatient consult to Neurosurgery  Consult performed by: Ashely Ramos PA-C  Consult ordered by: Marisel Delgado PA-C         Contacted by Dr. Kassie Pham.    Juan San 68 y.o. male MRN: [id number]  Unit/Bed#: -01 Encounter: [id number]    Reason for Consult  Per provider report, patient is a 67yo M with a history of chronic low back pain and lumbar radiculopathy who is known to the [facility] neurosurgery office as he is s/p prior T12-S1 PLDF with Dr. Moraes on 4/11/2023.  Unfortunately in January 2024, the patient was noted to have osteomyelitis/discitis of the lumbar spine, specifically at L5-S1.  He underwent an IR biopsy which was negative for any growth.  Patient was ultimately treated with cefepime and vancomycin through 3/8/2024 per ID.  Patient was managed conservatively for this by the neurosurgery team with a LSO brace and close outpatient follow-up.  Patient was last seen on 2/23/2024 in our office.  At this appointment, the patient was directed to follow-up in additional 6 weeks, but unfortunately the patient was lost to follow-up.    Patient presented to the [facility] last night with complaints of acute on chronic LBP with rachel LE radiculopathy.  Per report, the patient states his pain has significantly worsened over the last week.  Patient was seen the night prior at [facility] where he underwent some blood work and was sent home with tramadol.  Patient Bob presented last night with complaints that his pain was not improved with the tramadol and still remains severe.  He denied any bowel/bladder incontinence, urinary retention, saddle anesthesia.  Patient received pain medications in the ER and a workup was started per the ED team.  He was noted to have a white blood cell count of 14.6, lactate of 2.8 and blood cultures were pending.  Patient received a dose of IV vancomycin in the ER as well as IV fluids and was admitted for pain control.  Neurosurgery was  "made aware and a consult was placed.    Available past medical history,social history, surgical history, medication list, drug allergies and review of systems were reviewed.    BP (!) 199/84 (BP Location: Left arm)   Pulse 60   Temp (!) 97 °F (36.1 °C) (Temporal)   Resp 18   Ht 5' 9\" (1.753 m)   Wt 77.8 kg (171 lb 8.3 oz)   SpO2 99%   BMI 25.33 kg/m²      Clinical exam:   GCS 15   No focal deficits   Tenderness noted over lumbar spine, no skin changes     Imaging:   CT L-spine: pending final radiology read   MRI L-spine ordered     Assessment and Recommendations  Continue to closely monitor neuro exam   Frequent neuro checks per primary team   Recommend further workup with an MRI L-spine w/wo contrast   Recommend lumbar flexion/extension x-rays to assess the hardware  Agree with repeat/ongoing infectious workup  Follow-up blood cultures  Consider ESR/CRP  Continue pain control with multimodal pain regimen per primary team   Can trial an LSO brace prn again if it provides comfort to the patient  DVT ppx: zhanna Sims for chem dvt ppx from a nsgy standpoint   Medical management per primary team   PT/OT   Social work following for assistance with dispo once medically cleared     Neurosurgery will follow from the periphery and review imaging once completed. Please reach out with any further questions or concerns.     All questions answered. Provider is in agreement with the course of action. 11-20 minutes, >50% of the total time devoted to medical consultative verbal/EMR discussion between providers. Written report will be generated in the EMR.    "

## 2024-05-19 NOTE — PROGRESS NOTES
Juan San is a 68 y.o. male who is currently ordered Vancomycin IV with management by the Pharmacy Consult service.  Relevant clinical data and objective / subjective history reviewed.  Vancomycin Assessment:  Indication and Goal AUC/Trough: Bone/joint infection (goal -600, trough >10)  Clinical Status:  New  Micro:   Pending  Renal Function:  SCr: 0.72 mg/dL  CrCl: 93.2 mL/min  Renal replacement: Not on dialysis  Days of Therapy: 1  Current Dose: 1750mg once loading dose  Vancomycin Plan:  New Dosinmg every 12 hours  Estimated AUC: 418 mcg*hr/mL  Estimated Trough: 10.9 mcg/mL  Next Level: 24 at 0530  Renal Function Monitoring: Daily BMP and UOP  Pharmacy will continue to follow closely for s/sx of nephrotoxicity, infusion reactions and appropriateness of therapy.  BMP and CBC will be ordered per protocol. We will continue to follow the patient’s culture results and clinical progress daily.    Bi Perez, Pharmacist, PharmD, BCPS

## 2024-05-19 NOTE — ASSESSMENT & PLAN NOTE
"Lab Results   Component Value Date    HGBA1C 8.2 (H) 01/24/2024       No results for input(s): \"POCGLU\" in the last 72 hours.    Blood Sugar Average: Last 72 hrs:  Home regimen: Metformin 1000 mg twice daily and Jardiance 10 Mg daily  Placed on SSI while inpatient  Update hemoglobin A1c  "

## 2024-05-20 ENCOUNTER — TELEPHONE (OUTPATIENT)
Dept: NEUROSURGERY | Facility: CLINIC | Age: 69
End: 2024-05-20

## 2024-05-20 ENCOUNTER — APPOINTMENT (INPATIENT)
Dept: RADIOLOGY | Facility: HOSPITAL | Age: 69
DRG: 552 | End: 2024-05-20
Payer: COMMERCIAL

## 2024-05-20 LAB
ANION GAP SERPL CALCULATED.3IONS-SCNC: 5 MMOL/L (ref 4–13)
BASOPHILS # BLD AUTO: 0.06 THOUSANDS/ÂΜL (ref 0–0.1)
BASOPHILS NFR BLD AUTO: 1 % (ref 0–1)
BUN SERPL-MCNC: 17 MG/DL (ref 5–25)
CALCIUM SERPL-MCNC: 9.1 MG/DL (ref 8.4–10.2)
CHLORIDE SERPL-SCNC: 96 MMOL/L (ref 96–108)
CO2 SERPL-SCNC: 31 MMOL/L (ref 21–32)
CREAT SERPL-MCNC: 0.73 MG/DL (ref 0.6–1.3)
EOSINOPHIL # BLD AUTO: 0.19 THOUSAND/ÂΜL (ref 0–0.61)
EOSINOPHIL NFR BLD AUTO: 2 % (ref 0–6)
ERYTHROCYTE [DISTWIDTH] IN BLOOD BY AUTOMATED COUNT: 16.4 % (ref 11.6–15.1)
GFR SERPL CREATININE-BSD FRML MDRD: 96 ML/MIN/1.73SQ M
GLUCOSE SERPL-MCNC: 141 MG/DL (ref 65–140)
GLUCOSE SERPL-MCNC: 191 MG/DL (ref 65–140)
GLUCOSE SERPL-MCNC: 204 MG/DL (ref 65–140)
GLUCOSE SERPL-MCNC: 270 MG/DL (ref 65–140)
HCT VFR BLD AUTO: 37.3 % (ref 36.5–49.3)
HGB BLD-MCNC: 11.6 G/DL (ref 12–17)
IMM GRANULOCYTES # BLD AUTO: 0.04 THOUSAND/UL (ref 0–0.2)
IMM GRANULOCYTES NFR BLD AUTO: 0 % (ref 0–2)
LYMPHOCYTES # BLD AUTO: 2.51 THOUSANDS/ÂΜL (ref 0.6–4.47)
LYMPHOCYTES NFR BLD AUTO: 23 % (ref 14–44)
MCH RBC QN AUTO: 25.3 PG (ref 26.8–34.3)
MCHC RBC AUTO-ENTMCNC: 31.1 G/DL (ref 31.4–37.4)
MCV RBC AUTO: 81 FL (ref 82–98)
MONOCYTES # BLD AUTO: 0.88 THOUSAND/ÂΜL (ref 0.17–1.22)
MONOCYTES NFR BLD AUTO: 8 % (ref 4–12)
NEUTROPHILS # BLD AUTO: 7.32 THOUSANDS/ÂΜL (ref 1.85–7.62)
NEUTS SEG NFR BLD AUTO: 66 % (ref 43–75)
NRBC BLD AUTO-RTO: 0 /100 WBCS
PLATELET # BLD AUTO: 336 THOUSANDS/UL (ref 149–390)
PMV BLD AUTO: 9.6 FL (ref 8.9–12.7)
POTASSIUM SERPL-SCNC: 3.8 MMOL/L (ref 3.5–5.3)
RBC # BLD AUTO: 4.59 MILLION/UL (ref 3.88–5.62)
SODIUM SERPL-SCNC: 132 MMOL/L (ref 135–147)
WBC # BLD AUTO: 11 THOUSAND/UL (ref 4.31–10.16)

## 2024-05-20 PROCEDURE — NC001 PR NO CHARGE: Performed by: NEUROLOGICAL SURGERY

## 2024-05-20 PROCEDURE — 99232 SBSQ HOSP IP/OBS MODERATE 35: CPT | Performed by: PHYSICIAN ASSISTANT

## 2024-05-20 PROCEDURE — 80048 BASIC METABOLIC PNL TOTAL CA: CPT | Performed by: INTERNAL MEDICINE

## 2024-05-20 PROCEDURE — 85025 COMPLETE CBC W/AUTO DIFF WBC: CPT | Performed by: INTERNAL MEDICINE

## 2024-05-20 PROCEDURE — 82948 REAGENT STRIP/BLOOD GLUCOSE: CPT

## 2024-05-20 PROCEDURE — 72110 X-RAY EXAM L-2 SPINE 4/>VWS: CPT

## 2024-05-20 RX ORDER — METHOCARBAMOL 500 MG/1
750 TABLET, FILM COATED ORAL EVERY 6 HOURS SCHEDULED
Status: DISCONTINUED | OUTPATIENT
Start: 2024-05-20 | End: 2024-05-21 | Stop reason: HOSPADM

## 2024-05-20 RX ORDER — HYDROMORPHONE HCL/PF 1 MG/ML
0.5 SYRINGE (ML) INJECTION
Status: DISCONTINUED | OUTPATIENT
Start: 2024-05-20 | End: 2024-05-21 | Stop reason: HOSPADM

## 2024-05-20 RX ORDER — LIDOCAINE 50 MG/G
2 PATCH TOPICAL DAILY
Status: DISCONTINUED | OUTPATIENT
Start: 2024-05-20 | End: 2024-05-21 | Stop reason: HOSPADM

## 2024-05-20 RX ADMIN — OXYCODONE HYDROCHLORIDE 10 MG: 10 TABLET ORAL at 09:13

## 2024-05-20 RX ADMIN — ACETAMINOPHEN 975 MG: 325 TABLET, FILM COATED ORAL at 14:33

## 2024-05-20 RX ADMIN — HYDROXYZINE HYDROCHLORIDE 50 MG: 25 TABLET ORAL at 09:14

## 2024-05-20 RX ADMIN — LIDOCAINE 5% 1 PATCH: 700 PATCH TOPICAL at 09:13

## 2024-05-20 RX ADMIN — OXYCODONE HYDROCHLORIDE 10 MG: 10 TABLET ORAL at 01:40

## 2024-05-20 RX ADMIN — HYDROXYZINE HYDROCHLORIDE 50 MG: 25 TABLET ORAL at 17:15

## 2024-05-20 RX ADMIN — HYDROMORPHONE HYDROCHLORIDE 0.5 MG: 1 INJECTION, SOLUTION INTRAMUSCULAR; INTRAVENOUS; SUBCUTANEOUS at 21:14

## 2024-05-20 RX ADMIN — FINASTERIDE 5 MG: 5 TABLET, FILM COATED ORAL at 09:14

## 2024-05-20 RX ADMIN — SERTRALINE 100 MG: 100 TABLET, FILM COATED ORAL at 09:13

## 2024-05-20 RX ADMIN — ASPIRIN 81 MG: 81 TABLET, COATED ORAL at 09:13

## 2024-05-20 RX ADMIN — FOLIC ACID 2000 MCG: 1 TABLET ORAL at 09:14

## 2024-05-20 RX ADMIN — CELECOXIB 100 MG: 100 CAPSULE ORAL at 09:14

## 2024-05-20 RX ADMIN — METHOCARBAMOL 750 MG: 500 TABLET ORAL at 11:21

## 2024-05-20 RX ADMIN — VANCOMYCIN HYDROCHLORIDE 1000 MG: 1 INJECTION, SOLUTION INTRAVENOUS at 05:28

## 2024-05-20 RX ADMIN — METHOCARBAMOL 750 MG: 500 TABLET ORAL at 17:14

## 2024-05-20 RX ADMIN — ENOXAPARIN SODIUM 40 MG: 40 INJECTION SUBCUTANEOUS at 09:12

## 2024-05-20 RX ADMIN — HYDROCHLOROTHIAZIDE 25 MG: 25 TABLET ORAL at 09:14

## 2024-05-20 RX ADMIN — HYDROMORPHONE HYDROCHLORIDE 0.5 MG: 1 INJECTION, SOLUTION INTRAMUSCULAR; INTRAVENOUS; SUBCUTANEOUS at 11:21

## 2024-05-20 RX ADMIN — CLONAZEPAM 1 MG: 1 TABLET ORAL at 17:15

## 2024-05-20 RX ADMIN — INSULIN LISPRO 2 UNITS: 100 INJECTION, SOLUTION INTRAVENOUS; SUBCUTANEOUS at 12:03

## 2024-05-20 RX ADMIN — GABAPENTIN 400 MG: 400 CAPSULE ORAL at 09:14

## 2024-05-20 RX ADMIN — TAMSULOSIN HYDROCHLORIDE 0.4 MG: 0.4 CAPSULE ORAL at 15:31

## 2024-05-20 RX ADMIN — ONDANSETRON 4 MG: 2 INJECTION INTRAMUSCULAR; INTRAVENOUS at 09:07

## 2024-05-20 RX ADMIN — HYDROMORPHONE HYDROCHLORIDE 0.5 MG: 1 INJECTION, SOLUTION INTRAMUSCULAR; INTRAVENOUS; SUBCUTANEOUS at 15:24

## 2024-05-20 RX ADMIN — INSULIN LISPRO 2 UNITS: 100 INJECTION, SOLUTION INTRAVENOUS; SUBCUTANEOUS at 17:17

## 2024-05-20 RX ADMIN — OXYCODONE HYDROCHLORIDE 10 MG: 10 TABLET ORAL at 14:34

## 2024-05-20 RX ADMIN — INSULIN LISPRO 2 UNITS: 100 INJECTION, SOLUTION INTRAVENOUS; SUBCUTANEOUS at 21:28

## 2024-05-20 RX ADMIN — CELECOXIB 100 MG: 100 CAPSULE ORAL at 17:15

## 2024-05-20 RX ADMIN — HYDROMORPHONE HYDROCHLORIDE 0.5 MG: 1 INJECTION, SOLUTION INTRAMUSCULAR; INTRAVENOUS; SUBCUTANEOUS at 05:26

## 2024-05-20 RX ADMIN — GABAPENTIN 400 MG: 400 CAPSULE ORAL at 21:13

## 2024-05-20 RX ADMIN — ACETAMINOPHEN 975 MG: 325 TABLET, FILM COATED ORAL at 21:13

## 2024-05-20 RX ADMIN — OXYCODONE HYDROCHLORIDE 10 MG: 10 TABLET ORAL at 17:15

## 2024-05-20 RX ADMIN — ACETAMINOPHEN 975 MG: 325 TABLET, FILM COATED ORAL at 05:24

## 2024-05-20 RX ADMIN — NIFEDIPINE 120 MG: 30 TABLET, EXTENDED RELEASE ORAL at 09:14

## 2024-05-20 RX ADMIN — METHOCARBAMOL 750 MG: 500 TABLET ORAL at 21:24

## 2024-05-20 RX ADMIN — METOPROLOL SUCCINATE 75 MG: 50 TABLET, EXTENDED RELEASE ORAL at 09:13

## 2024-05-20 RX ADMIN — HYDROMORPHONE HYDROCHLORIDE 0.5 MG: 1 INJECTION, SOLUTION INTRAMUSCULAR; INTRAVENOUS; SUBCUTANEOUS at 00:44

## 2024-05-20 RX ADMIN — MIRTAZAPINE 45 MG: 15 TABLET, FILM COATED ORAL at 21:13

## 2024-05-20 RX ADMIN — CLONAZEPAM 1 MG: 1 TABLET ORAL at 09:13

## 2024-05-20 RX ADMIN — LIDOCAINE 5% 2 PATCH: 700 PATCH TOPICAL at 10:42

## 2024-05-20 RX ADMIN — HYDROMORPHONE HYDROCHLORIDE 0.5 MG: 1 INJECTION, SOLUTION INTRAMUSCULAR; INTRAVENOUS; SUBCUTANEOUS at 18:04

## 2024-05-20 RX ADMIN — LOSARTAN POTASSIUM 100 MG: 50 TABLET, FILM COATED ORAL at 09:13

## 2024-05-20 RX ADMIN — PRAVASTATIN SODIUM 20 MG: 20 TABLET ORAL at 15:31

## 2024-05-20 NOTE — ASSESSMENT & PLAN NOTE
Status post partial thyroidectomy  Not maintained on Synthroid as levels have been stable  Follows with [facility], remains in remission

## 2024-05-20 NOTE — QUICK NOTE
Patient c/o 10/10 back & leg pain unrelieved by breakthrough dilaudid. Pain medication not yet due. Interventions of hot/cold application offered but pt denied. Per Dr.Dobrovolschi chao to give oxycodone 10mg early.

## 2024-05-20 NOTE — PROGRESS NOTES
Juan San is a 68 y.o. male who is currently ordered Vancomycin IV with management by the Pharmacy Consult service.  Relevant clinical data and objective / subjective history reviewed.  Vancomycin Assessment:  Indication and Goal AUC/Trough: Bone/joint infection (goal -600, trough >10)  Clinical Status: stable  Micro:   Pending  Renal Function:  SCr: 0.73 mg/dL  CrCl: 99.5 mL/min  Renal replacement: Not on dialysis  Days of Therapy: 2  Current Dose: 1000mg every 12 hours  Vancomycin Plan:  New Dosing: No change  Estimated AUC: 439 mcg*hr/mL  Estimated Trough: 11.7 mcg/mL  Next Level: 5/21/24 at 0530  Renal Function Monitoring: Daily BMP and UOP  Pharmacy will continue to follow closely for s/sx of nephrotoxicity, infusion reactions and appropriateness of therapy.  BMP and CBC will be ordered per protocol. We will continue to follow the patient’s culture results and clinical progress daily.    Bi Perez, Pharmacist, PharmD, BCPS

## 2024-05-20 NOTE — ASSESSMENT & PLAN NOTE
WBC at 14.60K on admit   Unclear if truly with infection, CT with possible discitis versus degeneration at site of prior discitis  High suspicion that leukocytosis is actually secondary to viral gastroenteritis  Trend CBC and fever curve  Monitor off IV vancomycin

## 2024-05-20 NOTE — ASSESSMENT & PLAN NOTE
Admission in January 2024 requiring IR biopsy and IV vancomycin and cefepime  Neurosurgery consulted due to CT findings on admit  No new findings per Neurosurgery  Outpatient follow up  Monitor off antibiotics

## 2024-05-20 NOTE — CASE MANAGEMENT
Case Management Assessment & Discharge Planning Note    Patient name Juan San  Location /-01 MRN [id number]  : 1955 Date 2024       Current Admission Date: 2024  Current Admission Diagnosis:Acute on chronic bilateral low back pain with sciatica   Patient Active Problem List    Diagnosis Date Noted Date Diagnosed    Leukocytosis 2024     Lactic acidosis 2024     Type 2 diabetes mellitus with hyperglycemia, without long-term current use of insulin (HCC) 2024     Viral gastroenteritis 2024     Foraminal stenosis of lumbar region 2024     Discitis of lumbosacral region 2024     Hypochromic microcytic anemia 10/10/2023     Acute on chronic bilateral low back pain with sciatica 10/09/2023     Anxiety and depression 10/09/2023     Hypertension 10/09/2023     Benign prostatic hyperplasia with urinary retention 2023     Scrotal pain 2023     Lower urinary tract symptoms 2023     Status post lumbar spinal fusion 2023     Hyponatremia 2023     Gallstones 2023     Anemia 2023     Urinary retention 2023     PONV (postoperative nausea and vomiting)      Medical marijuana use      Chronic bilateral low back pain without sciatica 2023     Lumbar radiculopathy      Chronic pain syndrome 2022     Liver disease 08/10/2022     Bronchitis, mucopurulent recurrent (HCC) 2022     Cirrhosis, alcoholic (HCC) 2022     Diabetic peripheral neuropathy (HCC) 2022     Herniated nucleus pulposus of lumbosacral region 2022     Thyroid cancer (HCC) 2021     Alcoholism in remission (HCC) 2021     Benzodiazepine dependence (HCC) 2021     Bilateral adhesive capsulitis of shoulders 2021     DM (diabetes mellitus) (HCC) 2021     Hypercholesterolemia 2021     Lumbar spondylosis 2021       LOS (days): 2  Geometric Mean LOS (GMLOS) (days): 2.9  Days to  GMLOS:1.2     OBJECTIVE:    Risk of Unplanned Readmission Score: 33.42         Current admission status: Inpatient       Preferred Pharmacy:   [facility] [address] [address]  Phone: [phone] Fax: [phone]    Primary Care Provider: Sabra Magaña DO    Primary Insurance: [employer]  Secondary Insurance: [employer] [facility] [employer] [facility]    ASSESSMENT:  Active Health Care Proxies       Tatyana San Health Care Representative - Spouse   Primary Phone: [phone] (Mobile)                 Advance Directives  Does patient have a Health Care POA?: No  Was patient offered paperwork?: Yes (declined)  Does patient currently have a Health Care decision maker?: Yes, please see Health Care Proxy section  Does patient have Advance Directives?: No  Was patient offered paperwork?: Yes  Primary Contact: Tatyana San, spouse         Readmission Root Cause  30 Day Readmission: No    Patient Information  Admitted from:: Home  Mental Status: Alert  During Assessment patient was accompanied by: Not accompanied during assessment  Assessment information provided by:: Patient  Primary Caregiver: Self  Support Systems: Family members, Self  County of Residence: [place]  What [facility] do you live in?: [place]  Home entry access options. Select all that apply.: No steps to enter home  Type of Current Residence: Apartment  Floor Level: 1  Upon entering residence, is there a bedroom on the main floor (no further steps)?: Yes  Upon entering residence, is there a bathroom on the main floor (no further steps)?: Yes  Living Arrangements: Lives w/ Spouse/significant other    Activities of Daily Living Prior to Admission  Functional Status: Independent  Completes ADLs independently?: Yes  Ambulates independently?: Yes  Does patient use assisted devices?: Yes  Assisted Devices (DME) used: Walker, Straight Cane  Does patient currently own DME?:  Yes  What DME does the patient currently own?: Lazaro Silva  Does patient have a history of Outpatient Therapy (PT/OT)?: Yes  Does the patient have a history of Short-Term Rehab?: Yes ([facility])  Does patient have a history of HHC?: Yes ([facility])  Does patient currently have HHC?: No         Patient Information Continued  Income Source: Pension/correction  Does patient have prescription coverage?: Yes  Does patient receive dialysis treatments?: No  Does patient have a history of substance abuse?: No  Does patient have a history of Mental Health Diagnosis?: No         Means of Transportation  Means of Transport to [facility]:: Family transport      Social Determinants of Health (SDOH)      Flowsheet Row Most Recent Value   Housing Stability    In the last 12 months, was there a time when you were not able to pay the mortgage or rent on time? N   In the past 12 months, how many times have you moved where you were living? 1   At any time in the past 12 months, were you homeless or living in a shelter (including now)? N   Transportation Needs    In the past 12 months, has lack of transportation kept you from medical appointments or from getting medications? no   In the past 12 months, has lack of transportation kept you from meetings, work, or from getting things needed for daily living? No   Food Insecurity    Within the past 12 months, you worried that your food would run out before you got the money to buy more. Never true   Within the past 12 months, the food you bought just didn't last and you didn't have money to get more. Never true   Utilities    In the past 12 months has the electric, gas, oil, or water company threatened to shut off services in your home? No            DISCHARGE DETAILS:    Discharge planning discussed with:: Pt and his spouse, Tatyana  Freedom of Choice: Yes     CM contacted family/caregiver?: Yes  Were Treatment Team discharge recommendations reviewed with patient/caregiver?:  Yes  Did patient/caregiver verbalize understanding of patient care needs?: Yes  Were patient/caregiver advised of the risks associated with not following Treatment Team discharge recommendations?: Yes    Contacts  Patient Contacts: Tatyana San, wife  Relationship to Patient:: Family  Contact Method: In Person  Reason/Outcome: Continuity of Care, Emergency Contact, Discharge Planning    Requested Home Health Care         Is the patient interested in HHC at discharge?: No    DME Referral Provided  Referral made for DME?: No              Treatment Team Recommendation: Home  Discharge Destination Plan:: Home  Transport at Discharge : Family                                      Additional Comments: CM met with pt and his wife at bedside to discuss role of CM and any needs prior to discharge. Pt lives w/ spouse in 1st flr apartment w/ 0STE. Indp PTA. Owns cane, walker. Hx STR through [place], OP PT, and HH through [facility] and [facility]. Pt gets 54hrs/wk of private care through [facility]. No hx DA/MH. Pt requested a new walker. CM found that he had received one in 2022 and insurance only covers 1 every 5 years. Pt prefers to use [facility] in [place]. Spouse will transport at discharge.

## 2024-05-20 NOTE — PLAN OF CARE
Problem: Prexisting or High Potential for Compromised Skin Integrity  Goal: Skin integrity is maintained or improved  Description: INTERVENTIONS:  - Identify patients at risk for skin breakdown  - Assess and monitor skin integrity  - Assess and monitor nutrition and hydration status  - Monitor labs   - Assess for incontinence   - Turn and reposition patient  - Assist with mobility/ambulation  - Relieve pressure over bony prominences  - Avoid friction and shearing  - Provide appropriate hygiene as needed including keeping skin clean and dry  - Evaluate need for skin moisturizer/barrier cream  - Collaborate with interdisciplinary team   - Patient/family teaching  - Consider wound care consult   Outcome: Progressing     Problem: PAIN - ADULT  Goal: Verbalizes/displays adequate comfort level or baseline comfort level  Description: Interventions:  - Encourage patient to monitor pain and request assistance  - Assess pain using appropriate pain scale  - Administer analgesics based on type and severity of pain and evaluate response  - Implement non-pharmacological measures as appropriate and evaluate response  - Consider cultural and social influences on pain and pain management  - Notify physician/advanced practitioner if interventions unsuccessful or patient reports new pain  Outcome: Progressing     Problem: SAFETY ADULT  Goal: Patient will remain free of falls  Description: INTERVENTIONS:  - Educate patient/family on patient safety including physical limitations  - Instruct patient to call for assistance with activity   - Consult OT/PT to assist with strengthening/mobility   - Keep Call bell within reach  - Keep bed low and locked with side rails adjusted as appropriate  - Keep care items and personal belongings within reach  - Initiate and maintain comfort rounds  - Make Fall Risk Sign visible to staff  - Offer Toileting every 2 Hours, in advance of need  - Obtain necessary fall risk management equipment: nonskid  footwear  - Apply yellow socks and bracelet for high fall risk patients  - Consider moving patient to room near nurses station  Outcome: Progressing     Problem: DISCHARGE PLANNING  Goal: Discharge to home or other facility with appropriate resources  Description: INTERVENTIONS:  - Identify barriers to discharge w/patient and caregiver  - Arrange for needed discharge resources and transportation as appropriate  - Identify discharge learning needs (meds, wound care, etc.)  - Arrange for interpretive services to assist at discharge as needed  - Refer to Case Management Department for coordinating discharge planning if the patient needs post-hospital services based on physician/advanced practitioner order or complex needs related to functional status, cognitive ability, or social support system  Outcome: Progressing     Problem: Knowledge Deficit  Goal: Patient/family/caregiver demonstrates understanding of disease process, treatment plan, medications, and discharge instructions  Description: Complete learning assessment and assess knowledge base.  Interventions:  - Provide teaching at level of understanding  - Provide teaching via preferred learning methods  Outcome: Progressing     Problem: METABOLIC, FLUID AND ELECTROLYTES - ADULT  Goal: Fluid balance maintained  Description: INTERVENTIONS:  - Monitor labs   - Monitor I/O and WT  - Instruct patient on fluid and nutrition as appropriate  - Assess for signs & symptoms of volume excess or deficit  Outcome: Progressing  Goal: Glucose maintained within target range  Description: INTERVENTIONS:  - Monitor Blood Glucose as ordered  - Assess for signs and symptoms of hyperglycemia and hypoglycemia  - Administer ordered medications to maintain glucose within target range  - Assess nutritional intake and initiate nutrition service referral as needed  Outcome: Progressing     Problem: MUSCULOSKELETAL - ADULT  Goal: Maintain or return mobility to safest level of  function  Description: INTERVENTIONS:  - Assess patient's ability to carry out ADLs; assess patient's baseline for ADL function and identify physical deficits which impact ability to perform ADLs (bathing, care of mouth/teeth, toileting, grooming, dressing, etc.)  - Assess/evaluate cause of self-care deficits   - Assess range of motion  - Assess patient's mobility  - Assess patient's need for assistive devices and provide as appropriate  - Encourage maximum independence but intervene and supervise when necessary  - Involve family in performance of ADLs  - Assess for home care needs following discharge   - Consider OT consult to assist with ADL evaluation and planning for discharge  - Provide patient education as appropriate  Outcome: Progressing     Problem: Potential for Falls  Goal: Patient will remain free of falls  Description: INTERVENTIONS:  - Educate patient/family on patient safety including physical limitations  - Instruct patient to call for assistance with activity   - Consult OT/PT to assist with strengthening/mobility   - Keep Call bell within reach  - Keep bed low and locked with side rails adjusted as appropriate  - Keep care items and personal belongings within reach  - Initiate and maintain comfort rounds  - Make Fall Risk Sign visible to staff  - Offer Toileting every 2 Hours, in advance of need  - Obtain necessary fall risk management equipment: nonskid footwear  - Apply yellow socks and bracelet for high fall risk patients  - Consider moving patient to room near nurses station  Outcome: Progressing

## 2024-05-20 NOTE — ASSESSMENT & PLAN NOTE
Lab Results   Component Value Date    HGBA1C 7.9 (H) 05/19/2024       Recent Labs     05/19/24  0706 05/19/24  1107 05/19/24  1636 05/19/24  2110   POCGLU 172* 186* 121 160*       Blood Sugar Average: Last 72 hrs:  (P) 159.75Home regimen: Metformin 1000 mg twice daily and Jardiance 10 Mg daily  Placed on SSI while inpatient  Update hemoglobin A1c

## 2024-05-20 NOTE — PROGRESS NOTES
"[facility]  Progress Note  Name: Juan San I  MRN: [id number]  Unit/Bed#: -01 I Date of Admission: 5/18/2024   Date of Service: 5/20/2024 I Hospital Day: 2    Assessment & Plan   * Acute on chronic bilateral low back pain with sciatica  Assessment & Plan  Presents to the ED with worsening lower back pain for the last week, seen in [facility] on 5/17 and discharged home  History of discitis in January 2024 requiring IV antibiotics and IR biopsy that showed no growth  - completed IV vancomycin and cefepime outpatient on 3/08/2024  History of lumbar fusion April 2023  CT L-spine: \"There is a spinal fusion spanning T12-L 1 levels.  There is a retrolisthesis of L1 on L2 and a similar retrolisthesis of L5 on S1.  Generalized sclerosis of the lumbar, sacral, and pelvic marrow.  Disc degeneration and/or discitis at that L1-L2 and L5-S1 levels.  These changes are not necessarily acute.  No paraspinous mass or hematoma\"  MRI lumbar spine  - stable findings  Suspect musculoskeletal strain in the setting of recent gastroenteritis.   Pain control: Oxy 5/10 as needed and Dilaudid for breakthrough pain, Tylenol 975 every 8 hours  Ordered vancomycin until MRI obtained due to questionable discitis on CT scan, however patient without any fevers or chills, ESR/CRP normal, procal normal, will monitor off antibiotics for now.   Outpatient follow up with neurosurgery     Discitis of lumbosacral region  Assessment & Plan  Admission in January 2024 requiring IR biopsy and IV vancomycin and cefepime  Neurosurgery consulted due to CT findings on admit  No new findings per Neurosurgery  Outpatient follow up  Monitor off antibiotics     Leukocytosis  Assessment & Plan  WBC at 14.60K on admit   Unclear if truly with infection, CT with possible discitis versus degeneration at site of prior discitis  High suspicion that leukocytosis is actually secondary to viral gastroenteritis  Trend CBC and " fever curve  Monitor off IV vancomycin    Lactic acidosis  Assessment & Plan  Lactic acid at 2.8, improved status post IVF to 1.0  Lactic acidosis secondary to dehydration from GI losses from viral gastroenteritis    Viral gastroenteritis  Assessment & Plan  Developed nausea and emesis approximately 3 days ago with generalized abdominal pain  Wife with similar symptoms  Suspect viral gastroenteritis  improved    Type 2 diabetes mellitus with hyperglycemia, without long-term current use of insulin (HCC)  Assessment & Plan  Lab Results   Component Value Date    HGBA1C 7.9 (H) 05/19/2024       Recent Labs     05/19/24  0706 05/19/24  1107 05/19/24  1636 05/19/24  2110   POCGLU 172* 186* 121 160*       Blood Sugar Average: Last 72 hrs:  (P) 159.75Home regimen: Metformin 1000 mg twice daily and Jardiance 10 Mg daily  Placed on SSI while inpatient  Update hemoglobin A1c    Hypertension  Assessment & Plan  Home regimen: Hyzaar 100-25 Mg daily, nifedipine 120 Mg daily, metoprolol 75 Mg daily  BP elevated, suspect multifactorial as patient had not taken all of his BP meds yet and he was in pain  Will continue home medications and trend BP per unit protocol    Cirrhosis, alcoholic (HCC)  Assessment & Plan  Managed by PCP  Continue outpatient follow-up    Thyroid cancer (HCC)  Assessment & Plan  Status post partial thyroidectomy  Not maintained on Synthroid as levels have been stable  Follows with Horacio Jackson, remains in remission             VTE Pharmacologic Prophylaxis: VTE Score: 3 Moderate Risk (Score 3-4) - Pharmacological DVT Prophylaxis Ordered: enoxaparin (Lovenox).    Mobility:   Basic Mobility Inpatient Raw Score: 21  JH-HLM Goal: 6: Walk 10 steps or more  JH-HLM Achieved: 6: Walk 10 steps or more  JH-HLM Goal achieved. Continue to encourage appropriate mobility.    Patient Centered Rounds: I performed bedside rounds with nursing staff today.   Discussions with Specialists or Other Care Team Provider: case  management    Education and Discussions with Family / Patient: Updated  (wife) at bedside.    Total Time Spent on Date of Encounter in care of patient: 35 mins. This time was spent on one or more of the following: performing physical exam; counseling and coordination of care; obtaining or reviewing history; documenting in the medical record; reviewing/ordering tests, medications or procedures; communicating with other healthcare professionals and discussing with patient's family/caregivers.    Current Length of Stay: 2 day(s)  Current Patient Status: Inpatient   Certification Statement: The patient will continue to require additional inpatient hospital stay due to PT evals, pain control, monitor off antibiotics  Discharge Plan: Anticipate discharge in 24-48 hrs to discharge location to be determined pending rehab evaluations.    Code Status: Level 1 - Full Code    Subjective:   C/O lumbar back pain radiating around his sides and down the back of his legs.     Objective:     Vitals:   Temp (24hrs), Av.7 °F (36.5 °C), Min:97.6 °F (36.4 °C), Max:97.8 °F (36.6 °C)    Temp:  [97.6 °F (36.4 °C)-97.8 °F (36.6 °C)] 97.6 °F (36.4 °C)  HR:  [66-80] 80  Resp:  [18] 18  BP: (154-163)/(73-80) 154/80  SpO2:  [99 %] 99 %  Body mass index is 25.33 kg/m².     Input and Output Summary (last 24 hours):     Intake/Output Summary (Last 24 hours) at 2024 1125  Last data filed at 2024 0401  Gross per 24 hour   Intake 560 ml   Output 950 ml   Net -390 ml       Physical Exam:   Physical Exam  Constitutional:       General: He is not in acute distress.     Appearance: He is well-developed.   HENT:      Head: Normocephalic and atraumatic.   Cardiovascular:      Rate and Rhythm: Normal rate and regular rhythm.      Heart sounds: No murmur heard.  Pulmonary:      Effort: Pulmonary effort is normal. No respiratory distress.      Breath sounds: Normal breath sounds. No wheezing or rales.   Abdominal:      General:  Bowel sounds are normal. There is no distension.      Palpations: Abdomen is soft.   Musculoskeletal:         General: Tenderness (low back tenderness to palpation) present.      Cervical back: Normal range of motion and neck supple.   Skin:     General: Skin is warm and dry.      Findings: No rash.   Neurological:      Mental Status: He is alert and oriented to person, place, and time.      Cranial Nerves: No cranial nerve deficit.          Additional Data:     Labs:  Results from last 7 days   Lab Units 05/20/24  0523   WBC Thousand/uL 11.00*   HEMOGLOBIN g/dL 11.6*   HEMATOCRIT % 37.3   PLATELETS Thousands/uL 336   SEGS PCT % 66   LYMPHO PCT % 23   MONO PCT % 8   EOS PCT % 2     Results from last 7 days   Lab Units 05/20/24  0523 05/19/24  0525 05/18/24  2140   SODIUM mmol/L 132*   < > 136   POTASSIUM mmol/L 3.8   < > 3.2*   CHLORIDE mmol/L 96   < > 95*   CO2 mmol/L 31   < > 28   BUN mg/dL 17   < > 13   CREATININE mg/dL 0.73   < > 0.78   ANION GAP mmol/L 5   < > 13   CALCIUM mg/dL 9.1   < > 9.9   ALBUMIN g/dL  --   --  4.8   TOTAL BILIRUBIN mg/dL  --   --  0.65   ALK PHOS U/L  --   --  99   ALT U/L  --   --  17   AST U/L  --   --  25   GLUCOSE RANDOM mg/dL 141*   < > 170*    < > = values in this interval not displayed.         Results from last 7 days   Lab Units 05/19/24  2110 05/19/24  1636 05/19/24  1107 05/19/24  0706   POC GLUCOSE mg/dl 160* 121 186* 172*     Results from last 7 days   Lab Units 05/19/24  0525   HEMOGLOBIN A1C % 7.9*     Results from last 7 days   Lab Units 05/19/24  0525 05/19/24  0017 05/18/24  2140   LACTIC ACID mmol/L  --  1.0 2.8*   PROCALCITONIN ng/ml 0.07  --  0.07       Lines/Drains:  Invasive Devices       Peripheral Intravenous Line  Duration             Peripheral IV 05/18/24 Left Antecubital 1 day    Peripheral IV 05/19/24 Right;Upper;Ventral (anterior) Arm <1 day                          Imaging: Reviewed radiology reports from this admission including: MRI spine    Recent  Cultures (last 7 days):   Results from last 7 days   Lab Units 05/18/24  2320 05/18/24  2140   BLOOD CULTURE  No Growth at 24 hrs. No Growth at 24 hrs.       Last 24 Hours Medication List:   Current Facility-Administered Medications   Medication Dose Route Frequency Provider Last Rate    acetaminophen  975 mg Oral Q8H [facility] Marisel Delgado PA-C      aluminum-magnesium hydroxide-simethicone  30 mL Oral Q6H PRN Marisel Delgado PA-C      aspirin  81 mg Oral Daily ABDIRASHID Park-DEANNA      celecoxib  100 mg Oral BID Marisel Delgado, PA-DEANNA      clonazePAM  1 mg Oral BID Marisel Delgado, DANIEL      clonazePAM  1 mg Oral Daily PRN Marisel Delgado PA-C      enoxaparin  40 mg Subcutaneous Daily Marisel Delgado PA-C      finasteride  5 mg Oral Daily Marisel Delgado PA-C      folic acid  2,000 mcg Oral Daily Marisel Delgado PA-C      gabapentin  400 mg Oral BID Marisel Delgado PA-C      losartan  100 mg Oral QAM Marisel Delgado PA-C      And    hydroCHLOROthiazide  25 mg Oral QAM Marisel Delgado PA-C      HYDROmorphone  0.5 mg Intravenous Q4H PRN Marisel Delgado PA-C      hydrOXYzine HCL  50 mg Oral BID Marisel Delgado PA-C      insulin lispro  1-5 Units Subcutaneous HS Marisel Delgado PA-C      insulin lispro  1-6 Units Subcutaneous TID AC Marisel Delgado PA-C      lidocaine  2 patch Topical Daily Janee Zuleta PA-C      methocarbamol  750 mg Oral Q6H [facility] Janee Zuleta PA-C      metoprolol succinate  75 mg Oral QAM Marisel Delgado PA-C      mirtazapine  45 mg Oral HS Marisel Delgado PA-C      NIFEdipine  120 mg Oral Daily Marisel Delgado PA-C      ondansetron  4 mg Intravenous Q4H PRN Marisel Delgado PA-C      oxyCODONE  5 mg Oral Q4H PRN Marisel Delgado PA-C      Or    oxyCODONE  10 mg Oral Q4H PRN Marisel Delgado PA-C      pravastatin  20 mg Oral Daily With Dinner Marisel Delgado PA-C      senna  1 tablet Oral HS PRN Marisel Delgado PA-C      sertraline   100 mg Oral QAM Marisel Delgado PA-C      tamsulosin  0.4 mg Oral Daily With Dinner Marisel Delgado PA-C          Today, Patient Was Seen By: Janee Zuleta PA-C    **Please Note: This note may have been constructed using a voice recognition system.**

## 2024-05-20 NOTE — UTILIZATION REVIEW
NOTIFICATION OF INPATIENT ADMISSION   AUTHORIZATION REQUEST   SERVICING FACILITY:   [place] [address]  Tax ID: [id number]  NPI: [id number] ATTENDING PROVIDER:  Attending Name and NPI#: Leah He Md [[id number]]  Address: [address] [place] [address]  Phone: [phone]   ADMISSION INFORMATION:  Place of Service: Inpatient [facility]  Place of Service Code: 21  Inpatient Admission Date/Time: 5/18/24 10:54 PM  Discharge Date/Time: No discharge date for patient encounter.  Admitting Diagnosis Code/Description:  Back pain [M54.9]  Leukocytosis [D72.829]  Personal history of osteomyelitis [Z87.39]  Elevated lactic acid level [R79.89]  Acute exacerbation of chronic low back pain [M54.50, G89.29]     UTILIZATION REVIEW CONTACT:  Ashly Resendiz, Utilization   Network Utilization Review Department  Phone: [phone]  Fax: [phone]  Email: [email or web address]  Contact for approvals/pending authorizations, clinical reviews, and discharge.     PHYSICIAN ADVISORY SERVICES:  Medical Necessity Denial & Epvb-[email or web address] Review  Phone: [phone]  Fax: [phone]  Email: [email or web address]     DISCHARGE SUPPORT TEAM:  For Patients Discharge Needs & Updates  Phone: [phone] opt. 2 Fax: [phone]  Email: [email or web address]

## 2024-05-20 NOTE — UTILIZATION REVIEW
Initial Clinical Review    Admission: Date/Time/Statement:   Admission Orders (From admission, onward)       Ordered        05/18/24 2254  INPATIENT ADMISSION  Once                          Orders Placed This Encounter   Procedures    INPATIENT ADMISSION     Standing Status:   Standing     Number of Occurrences:   1     Order Specific Question:   Level of Care     Answer:   Med Surg [16]     Order Specific Question:   Estimated length of stay     Answer:   More than 2 Midnights     Order Specific Question:   Certification     Answer:   I certify that inpatient services are medically necessary for this patient for a duration of greater than two midnights. See H&P and MD Progress Notes for additional information about the patient's course of treatment.     ED Arrival Information       Expected   -    Arrival   5/18/2024 21:13    Acuity   Urgent              Means of arrival   Ambulance    Escorted by   [facility]    Service   Hospitalist    Admission type   Emergency              Arrival complaint   Lower back pain             Chief Complaint   Patient presents with    Pain     Pt has back surgery over a year ago. Pt was just seen last night at [facility] for his chronic back pain. Pt has not followed up with anyone for his chronic back pain. Pt was suppose to see a pain specialist        Initial Presentation: 68 y.o. male  to ED via EMS from home.    Admitted to inpatient with Dx: acute on chronic bilateral low back pain with Sciatica/Viral gastroenteritis/type 2 DM with hyperglycemia.  Presented to ED with low back pain radiating to posterior upper legs  starting week prior to arrival.  Developed nausea and emesis approximately 3 days ago with generalized abdominal pain.   Seen at outside ED yesterday for back pain and sent home on tramadol, ineffective.  Completed IV antibiotics for discitis on 3/8/24.  PMHx:  chronic low back pain, discitis January 2024, NIDDM 2, HTN, thyroid cancer status postresection,  "and BPH.   On exam: acute distress.  Appears uncomfortable.   Diffuse tenderness lower back.  Lactic acid 2.8.  K 3.2.  glucose 170.  Wbc 14.60.    Imaging shows:  \"Stable posterior fusion hardware T12-S1 with decompressive laminectomies at the L1 through the L5 levels. Persistent lucency surrounding the bilateral S1 screws suggestive of loosening.   Prior discitis/osteomyelitis at the L5-S1 level with interval increased endplate irregularity and lucency along the endplates compared to 1/24/2024 suggestive of worsening disease. There is again suggestion of moderate ventral epidural phlegmonous changes with narrowing of the thecal sac\"    ED treatment:  1 liter IVF bolus x 2, Zofran,  Toradol and Valium, Dilaudid and potassium repletion. ED spoke with neurosurgery-recommend admission for MRI and pain control.     Plan includes pain control:  Oxy 5/10 as needed and Dilaudid for breakthrough pain, Tylenol 975 every 8 hours .  MRI Lumbar spine.  Start vancomycin.  Consult  neurosurgery.   Antiemetics as needed.  Continue IVF.   Hold metformin and Jardiance.   Start SSI.      Anticipated Length of Stay/Certification Statement: Patient will be admitted on an inpatient basis with an anticipated length of stay of greater than 2 midnights secondary to acute on chronic low back pain, history of discitis.     Date: 5/19/24    Day 2: continued back pain radiates to butt and legs.  Rates 10/10.    Some dyspnea on exertion.   On exam:  lungs diminished breath sounds. When awakens appears uncomfortable.  MRI L spine pending and on vancomycin.  Continue pain control.  Requires IV analgesia.     5/19/24 per neurosurgery - history of chronic low back pain and lumbar radiculopathy. s/p prior T12-S1 PLDF on 4/11/2023.  January 2024, the patient was noted to have osteomyelitis/discitis of the lumbar spine, specifically at L5-S1.  He underwent an IR biopsy which was negative for any growth.  Patient was ultimately treated with cefepime " and vancomycin through 3/8/2024 per ID.   Plan: monitor neuro exam. MRI lumbar spine.   Lumbar flexion X-rays to assess hardware.   Follow blood cultures.   Pain control.   DVT ppx.   PT/OT.      Patient has crossed 3 midnights and requires ongoing care    5/20/2024 .  Patient presents with  ongoing low back pain radiating around to  his sides and back of legs.   On exam tender to low back.    Abnormal labs or imaging:  MRI L spine stable postoperative findings and loosening S1 pedicle screw. No abscess. No significant central stenosis.   Diagnosis/Plan    acute on chronic low back pain with sciatica.  Suspect musculoskeletal strain in the setting of recent gastroenteritis.   monitor off antibiotics.  Consult ID.   Pain control.  OP follow up with neuro surgery.  Continue SSI.     ED Triage Vitals   Temperature Pulse Respirations Blood Pressure SpO2   05/18/24 2115 05/18/24 2115 05/18/24 2115 05/18/24 2115 05/18/24 2115   98.1 °F (36.7 °C) 89 18 158/84 99 %      Temp Source Heart Rate Source Patient Position - Orthostatic VS BP Location FiO2 (%)   05/18/24 2115 05/18/24 2115 05/19/24 0045 05/19/24 0045 --   Temporal Monitor Lying Left arm       Pain Score       05/18/24 2146       10 - Worst Possible Pain          Wt Readings from Last 1 Encounters:   05/19/24 77.8 kg (171 lb 8.3 oz)     Additional Vital Signs:   05/20/24 0913 -- 80 -- 154/80 -- -- -- --   05/19/24 2029 97.6 °F (36.4 °C) -- -- -- -- -- -- Lying   05/19/24 1900 97.6 °F (36.4 °C) -- -- 157/73 -- -- -- Lying   05/19/24 1449 97.8 °F (36.6 °C) 66 18 163/77 106 99 % None (Room air) --   05/19/24 07:08:52 96.8 °F (36 °C) Abnormal  64 -- 195/86 Abnormal  122 99 % -- --   05/19/24 0105 -- -- -- -- -- -- None (Room air) --   05/19/24 00:45:15 97 °F (36.1 °C) Abnormal  60 18 199/84 Abnormal  122 99 % None (Room air) Lying   05/18/24 2230 -- 87 19 195/81 Abnormal  116 99 % None (Room air) --   05/18/24 2200 -- 77 -- 218/100 Abnormal  143 95 % --      Pertinent  Labs/Diagnostic Test Results:   MRI lumbar spine w wo contrast   Final Result by E. Alec Schoenberger, MD (05/19 5389)   Stable postoperative changes status post fusion from T12-S1. Stable loosening of the S1 pedicle screws with surrounding marrow edema that may be due to infection but could also be due to reactive edema   Sequela of discitis/osteomyelitis again seen from L1-2 to L5-S1.   Residual enhancing ventral epidural soft tissue at L5-S1 may represent granulation tissue and/or phlegmon. Similar prevertebral enhancement at L5 and S1   No abscess.   No compression of the conus or cauda equina      Workstation performed: [id number]         CT lumbar spine without contrast   Final Result by Perez Dias MD (05/19 7474)   Stable posterior fusion hardware T12-S1 with decompressive laminectomies at the L1 through the L5 levels. Persistent lucency surrounding the bilateral S1 screws suggestive of loosening.      Prior discitis/osteomyelitis at the L5-S1 level with interval increased endplate irregularity and lucency along the endplates compared to 1/24/2024 suggestive of worsening disease. There is again suggestion of moderate ventral epidural phlegmonous    changes with narrowing of the thecal sac. Follow-up MRI of the lumbar spine with contrast is recommended.      Persistent endplate irregularity at the L1-2, L2-3, and L4-5 levels likely related to known subacute discitis/osteomyelitis.      The study was marked in EPIC for immediate notification.            Workstation performed: [id number]         XR spine lumbar minimum 4 views non injury    (Results Pending)     5/19/24 ecg Sinus rhythm with Premature atrial complexes  Otherwise normal ECG  When compared with ECG of 14-MAR-2023 11:16,  Premature atrial complexes are now Present  Sinus rhythm with Premature atrial complexes  Otherwise normal ECG  When compared with ECG of 14-MAR-2023 11:16,  Premature atrial complexes are now Present    Results from last  7 days   Lab Units 05/20/24  0523 05/19/24  0525 05/18/24  2140   WBC Thousand/uL 11.00* 17.43* 14.60*   HEMOGLOBIN g/dL 11.6* 12.1 12.3   HEMATOCRIT % 37.3 39.7 40.5   PLATELETS Thousands/uL 336 477* 459*   TOTAL NEUT ABS Thousands/µL 7.32  --  11.57*     Results from last 7 days   Lab Units 05/20/24  0523 05/19/24  0525 05/18/24  2140   SODIUM mmol/L 132* 135 136   POTASSIUM mmol/L 3.8 2.9* 3.2*   CHLORIDE mmol/L 96 96 95*   CO2 mmol/L 31 28 28   ANION GAP mmol/L 5 11 13   BUN mg/dL 17 11 13   CREATININE mg/dL 0.73 0.72 0.78   EGFR ml/min/1.73sq m 96 97 94   CALCIUM mg/dL 9.1 9.2 9.9   MAGNESIUM mg/dL  --  1.9  --      Results from last 7 days   Lab Units 05/18/24  2140   AST U/L 25   ALT U/L 17   ALK PHOS U/L 99   TOTAL PROTEIN g/dL 8.5*   ALBUMIN g/dL 4.8   TOTAL BILIRUBIN mg/dL 0.65     Results from last 7 days   Lab Units 05/19/24  2110 05/19/24  1636 05/19/24  1107 05/19/24  0706   POC GLUCOSE mg/dl 160* 121 186* 172*     Results from last 7 days   Lab Units 05/20/24  0523 05/19/24  0525 05/18/24  2140   GLUCOSE RANDOM mg/dL 141* 173* 170*     Results from last 7 days   Lab Units 05/19/24  0525   HEMOGLOBIN A1C % 7.9*   EAG mg/dl 180     Results from last 7 days   Lab Units 05/19/24  0525 05/18/24  2140   PROCALCITONIN ng/ml 0.07 0.07     Results from last 7 days   Lab Units 05/19/24  0017 05/18/24  2140   LACTIC ACID mmol/L 1.0 2.8*     Results from last 7 days   Lab Units 05/19/24  0525   CRP mg/L 2.9   SED RATE mm/hour 11     Results from last 7 days   Lab Units 05/18/24  2145   CLARITY UA  Clear   COLOR UA  Light Yellow   SPEC GRAV UA  1.015   PH UA  7.5   GLUCOSE UA mg/dl 300 (3/10%)*   KETONES UA mg/dl Trace*   BLOOD UA  Trace*   PROTEIN UA mg/dl 100 (2+)*   NITRITE UA  Negative   BILIRUBIN UA  Negative   UROBILINOGEN UA (BE) mg/dl <2.0   LEUKOCYTES UA  Negative   WBC UA /hpf 0-1   RBC UA /hpf 2-4   BACTERIA UA /hpf Occasional   EPITHELIAL CELLS WET PREP /hpf Occasional     Results from last 7 days    Lab Units 05/18/24 2320 05/18/24 2140   BLOOD CULTURE  No Growth at 24 hrs. No Growth at 24 hrs.       ED Treatment:   Medication Administration from 05/18/2024 2112 to 05/19/2024 0035         Date/Time Order Dose Route Action Comments     05/18/2024 2142 EDT sodium chloride 0.9 % bolus 1,000 mL 1,000 mL Intravenous New Bag --     05/18/2024 2144 EDT ondansetron (ZOFRAN) injection 4 mg 4 mg Intravenous Given --     05/18/2024 2146 EDT ketorolac (TORADOL) injection 15 mg 15 mg Intravenous Given --     05/18/2024 2144 EDT diazepam (VALIUM) injection 5 mg 5 mg Intravenous Given --     05/18/2024 2147 EDT acetaminophen (TYLENOL) tablet 650 mg 650 mg Oral Given --     05/18/2024 2227 EDT HYDROmorphone (DILAUDID) injection 0.5 mg 0.5 mg Intravenous Given --     05/18/2024 2320 EDT potassium chloride (Klor-Con M20) CR tablet 40 mEq 40 mEq Oral Given --     05/18/2024 2320 EDT sodium chloride 0.9 % bolus 1,000 mL 1,000 mL Intravenous New Bag --          Past Medical History:   Diagnosis Date    Anxiety     Arthritis     Cancer (HCC)     Chronic back pain     Depression     Diabetes mellitus (HCC)     Hypertension     Liver disease     Mitral valve prolapse     Peripheral neuropathy     Neuropathy    PONV (postoperative nausea and vomiting)     Thyroid disease      Present on Admission:   Cirrhosis, alcoholic (HCC)   Hypertension   Discitis of lumbosacral region   Acute on chronic bilateral low back pain with sciatica   Thyroid cancer (HCC)      Admitting Diagnosis: Back pain [M54.9]  Leukocytosis [D72.829]  Personal history of osteomyelitis [Z87.39]  Elevated lactic acid level [R79.89]  Acute exacerbation of chronic low back pain [M54.50, G89.29]  Age/Sex: 68 y.o. male  Admission Orders:  5/18/24 2254 inpatient   Scheduled Medications:  acetaminophen, 975 mg, Oral, Q8H CHRISTIE  aspirin, 81 mg, Oral, Daily  celecoxib, 100 mg, Oral, BID  clonazePAM, 1 mg, Oral, BID  enoxaparin, 40 mg, Subcutaneous, Daily  finasteride, 5 mg,  Oral, Daily  folic acid, 2,000 mcg, Oral, Daily  gabapentin, 400 mg, Oral, BID  losartan, 100 mg, Oral, QAM   And  hydroCHLOROthiazide, 25 mg, Oral, QAM  hydrOXYzine HCL, 50 mg, Oral, BID  insulin lispro, 1-5 Units, Subcutaneous, HS  insulin lispro, 1-6 Units, Subcutaneous, TID AC  lidocaine, 2 patch, Topical, Daily  methocarbamol, 750 mg, Oral, Q6H CHRISTIE  metoprolol succinate, 75 mg, Oral, QAM  mirtazapine, 45 mg, Oral, HS  NIFEdipine, 120 mg, Oral, Daily  pravastatin, 20 mg, Oral, Daily With Dinner  sertraline, 100 mg, Oral, QAM  tamsulosin, 0.4 mg, Oral, Daily With Dinner    potassium chloride (Klor-Con M20) CR tablet 40 mEq  Dose: 40 mEq  Freq: Once Route: PO  Start: 05/19/24 0815 End: 05/19/24 0909   potassium chloride (Klor-Con M20) CR tablet 40 mEq  Dose: 40 mEq  Freq: Once Route: PO  Start: 05/19/24 1015 End: 05/19/24 0922    vancomycin (VANCOCIN) 1750 mg in sodium chloride 0.9% 500 mL IVPB  Dose: 1,750 mg  Freq: Once Route: IV  Start: 05/19/24 0600 End: 05/19/24 0802  vancomycin (VANCOCIN) IVPB (premix in dextrose) 1,000 mg 200 mL  Dose: 1,000 mg  Freq: Every 12 hours Route: IV  Last Dose: 1,000 mg (05/20/24 0528)  Start: 05/19/24 1800 End: 05/20/24 1113    Continuous IV Infusions:  multi-electrolyte (PLASMALYTE-A/ISOLYTE-S PH 7.4) IV solution  Rate: 125 mL/hr Dose: 125 mL/hr  Freq: Continuous Route: IV  Last Dose: Stopped (05/20/24 0740)  Start: 05/19/24 0530 End: 05/19/24 1801     PRN Meds:  aluminum-magnesium hydroxide-simethicone, 30 mL, Oral, Q6H PRN  clonazePAM, 1 mg, Oral, Daily PRN  HYDROmorphone, 0.5 mg, Intravenous, Q4H PRN - x 4 5/19.  X 3 5/20  ondansetron, 4 mg, Intravenous, Q4H PRN x 5 5/19.  X 1 5/20  oxyCODONE, 5 mg, Oral, Q4H PRN   Or  oxyCODONE, 10 mg, Oral, Q4H PRN x 5 5/19.  X 2 5/20  senna, 1 tablet, Oral, HS PRN    PT/OT    IP CONSULT TO NEUROSURGERY  IP CONSULT TO INFECTIOUS DISEASES    Network Utilization Review Department  ATTENTION: Please call with any questions or concerns to  [phone] and carefully listen to the prompts so that you are directed to the right person. All voicemails are confidential.   For Discharge needs, contact Care Management DC Support Team at [phone] opt. [phone]  Send all requests for admission clinical reviews, approved or denied determinations and any other requests to dedicated fax number below belonging to the campus where the patient is receiving treatment. List of dedicated fax numbers for the Facilities:  FACILITY NAME UR FAX NUMBER   ADMISSION DENIALS (Administrative/Medical Necessity) [phone]   DISCHARGE SUPPORT TEAM (NETWORK) [phone]   PARENT CHILD HEALTH (Maternity/NICU/Pediatrics) [phone]   [facility] [phone]   [facility] [phone]   [facility] [phone]   [facility] [phone]   [facility] [phone]   [facility] [phone]   [facility] [phone]   [facility] [phone]   [facility] [phone]   [facility] [phone]   [facility] [phone]   [facility] [phone]

## 2024-05-20 NOTE — PROGRESS NOTES
MRI lumbar spine w/wo reviewed with attending demonstrating stable postoperative findings and loosening S1 pedicle screw. No abscess.  No significant central stenosis.     ESR/CRP normal. Blood cultures negative at 24H. Ongoing infectious work-up per primary team.   Pain control per primary team.     Recommended lumbar flexion/extension Xrays now ordered and pending.     No indication for transfer at this time.   Will plan for outpatient follow-up with Dr. Moraes    Call with questions/concerns.

## 2024-05-20 NOTE — ASSESSMENT & PLAN NOTE
"Presents to the ED with worsening lower back pain for the last week, seen in [facility] on 5/17 and discharged home  History of discitis in January 2024 requiring IV antibiotics and IR biopsy that showed no growth  - completed IV vancomycin and cefepime outpatient on 3/08/2024  History of lumbar fusion April 2023  CT L-spine: \"There is a spinal fusion spanning T12-L 1 levels.  There is a retrolisthesis of L1 on L2 and a similar retrolisthesis of L5 on S1.  Generalized sclerosis of the lumbar, sacral, and pelvic marrow.  Disc degeneration and/or discitis at that L1-L2 and L5-S1 levels.  These changes are not necessarily acute.  No paraspinous mass or hematoma\"  MRI lumbar spine  - stable findings  Suspect musculoskeletal strain in the setting of recent gastroenteritis.   Pain control: Oxy 5/10 as needed and Dilaudid for breakthrough pain, Tylenol 975 every 8 hours  Ordered vancomycin until MRI obtained due to questionable discitis on CT scan, however patient without any fevers or chills, ESR/CRP normal, procal normal, will monitor off antibiotics for now.   Outpatient follow up with neurosurgery   "

## 2024-05-20 NOTE — TELEPHONE ENCOUNTER
5/22/24 - PT DISCHARGED TO HOME  5/29/24 HFU W/ISAÍAS W/ 5/19/24 MRI     5/20/24 - PT IN [facility]   5/29/24 HFU W/ISAÍAS W/ 5/19/24 MRI

## 2024-05-20 NOTE — OCCUPATIONAL THERAPY NOTE
Occupational Therapy Screen Note     Patient Name: Juan San  Today's Date: 5/20/2024  Problem List  Principal Problem:    Acute on chronic bilateral low back pain with sciatica  Active Problems:    Cirrhosis, alcoholic (HCC)    Thyroid cancer (HCC)    Hypertension    Discitis of lumbosacral region    Leukocytosis    Lactic acidosis    Type 2 diabetes mellitus with hyperglycemia, without long-term current use of insulin (HCC)    Viral gastroenteritis              05/20/24 1626   OT Last Visit   OT Visit Date 05/20/24   Note Type   Note type Screen   Additional Comments OT orders received, chart reviewed. Pt's AMPAC score 24 for daily (indicating independent for ADLs), 21 for basic & documented as having achieved HLM goal of 7. Pt has caregivers 54 hrs/wk. No acute OT needs indicated at this time, DC OT orders.       Jessica Page, OTR/L

## 2024-05-20 NOTE — ASSESSMENT & PLAN NOTE
Lactic acid at 2.8, improved status post IVF to 1.0  Lactic acidosis secondary to dehydration from GI losses from viral gastroenteritis

## 2024-05-20 NOTE — PHYSICAL THERAPY NOTE
PHYSICAL THERAPY SCREEN NOTE      Patient Name: Juan San  Today's Date: 5/20/2024 05/20/24 2607   Note Type   Note type Screen   Additional Comments PT orders received, chart review performed. Pt w/ AMPAC of 21, achieved -HLM 7 of walking 25 ft. Pt limited due to chronic LBP, has caregivers 54 hours/week and family assistance. Pt w/ no acute PT needs at this time, will DC PT. Recommend OPPT/comprehensive spine program as this may assist w/ his chronic LBP       Lea Johnson, PT, DPT

## 2024-05-20 NOTE — ASSESSMENT & PLAN NOTE
Developed nausea and emesis approximately 3 days ago with generalized abdominal pain  Wife with similar symptoms  Suspect viral gastroenteritis  improved

## 2024-05-21 VITALS
OXYGEN SATURATION: 99 % | HEIGHT: 69 IN | SYSTOLIC BLOOD PRESSURE: 186 MMHG | WEIGHT: 171.52 LBS | BODY MASS INDEX: 25.4 KG/M2 | RESPIRATION RATE: 18 BRPM | TEMPERATURE: 96.8 F | DIASTOLIC BLOOD PRESSURE: 88 MMHG | HEART RATE: 91 BPM

## 2024-05-21 LAB
ANION GAP SERPL CALCULATED.3IONS-SCNC: 8 MMOL/L (ref 4–13)
BASOPHILS # BLD AUTO: 0.06 THOUSANDS/ÂΜL (ref 0–0.1)
BASOPHILS NFR BLD AUTO: 1 % (ref 0–1)
BUN SERPL-MCNC: 20 MG/DL (ref 5–25)
CALCIUM SERPL-MCNC: 9.6 MG/DL (ref 8.4–10.2)
CHLORIDE SERPL-SCNC: 95 MMOL/L (ref 96–108)
CO2 SERPL-SCNC: 32 MMOL/L (ref 21–32)
CREAT SERPL-MCNC: 0.92 MG/DL (ref 0.6–1.3)
EOSINOPHIL # BLD AUTO: 0.26 THOUSAND/ÂΜL (ref 0–0.61)
EOSINOPHIL NFR BLD AUTO: 3 % (ref 0–6)
ERYTHROCYTE [DISTWIDTH] IN BLOOD BY AUTOMATED COUNT: 16.3 % (ref 11.6–15.1)
GFR SERPL CREATININE-BSD FRML MDRD: 86 ML/MIN/1.73SQ M
GLUCOSE SERPL-MCNC: 145 MG/DL (ref 65–140)
GLUCOSE SERPL-MCNC: 152 MG/DL (ref 65–140)
GLUCOSE SERPL-MCNC: 179 MG/DL (ref 65–140)
HCT VFR BLD AUTO: 39.7 % (ref 36.5–49.3)
HGB BLD-MCNC: 12.1 G/DL (ref 12–17)
IMM GRANULOCYTES # BLD AUTO: 0.04 THOUSAND/UL (ref 0–0.2)
IMM GRANULOCYTES NFR BLD AUTO: 0 % (ref 0–2)
LYMPHOCYTES # BLD AUTO: 1.76 THOUSANDS/ÂΜL (ref 0.6–4.47)
LYMPHOCYTES NFR BLD AUTO: 19 % (ref 14–44)
MCH RBC QN AUTO: 24.7 PG (ref 26.8–34.3)
MCHC RBC AUTO-ENTMCNC: 30.5 G/DL (ref 31.4–37.4)
MCV RBC AUTO: 81 FL (ref 82–98)
MONOCYTES # BLD AUTO: 0.76 THOUSAND/ÂΜL (ref 0.17–1.22)
MONOCYTES NFR BLD AUTO: 8 % (ref 4–12)
NEUTROPHILS # BLD AUTO: 6.65 THOUSANDS/ÂΜL (ref 1.85–7.62)
NEUTS SEG NFR BLD AUTO: 69 % (ref 43–75)
NRBC BLD AUTO-RTO: 0 /100 WBCS
PLATELET # BLD AUTO: 356 THOUSANDS/UL (ref 149–390)
PMV BLD AUTO: 9.8 FL (ref 8.9–12.7)
POTASSIUM SERPL-SCNC: 3.5 MMOL/L (ref 3.5–5.3)
RBC # BLD AUTO: 4.89 MILLION/UL (ref 3.88–5.62)
SODIUM SERPL-SCNC: 135 MMOL/L (ref 135–147)
WBC # BLD AUTO: 9.53 THOUSAND/UL (ref 4.31–10.16)

## 2024-05-21 PROCEDURE — 80048 BASIC METABOLIC PNL TOTAL CA: CPT | Performed by: INTERNAL MEDICINE

## 2024-05-21 PROCEDURE — 99239 HOSP IP/OBS DSCHRG MGMT >30: CPT | Performed by: PHYSICIAN ASSISTANT

## 2024-05-21 PROCEDURE — 82948 REAGENT STRIP/BLOOD GLUCOSE: CPT

## 2024-05-21 PROCEDURE — 85025 COMPLETE CBC W/AUTO DIFF WBC: CPT | Performed by: INTERNAL MEDICINE

## 2024-05-21 RX ORDER — METHOCARBAMOL 750 MG/1
750 TABLET, FILM COATED ORAL EVERY 6 HOURS PRN
Start: 2024-05-21

## 2024-05-21 RX ORDER — OXYCODONE HYDROCHLORIDE 5 MG/1
5 TABLET ORAL EVERY 4 HOURS PRN
Qty: 30 TABLET | Refills: 0 | Status: SHIPPED | OUTPATIENT
Start: 2024-05-21 | End: 2024-05-31

## 2024-05-21 RX ORDER — SENNOSIDES 8.6 MG
8.6 TABLET ORAL 2 TIMES DAILY
Start: 2024-05-21

## 2024-05-21 RX ORDER — LORATADINE 10 MG/1
10 TABLET ORAL DAILY
Status: DISCONTINUED | OUTPATIENT
Start: 2024-05-21 | End: 2024-05-21 | Stop reason: HOSPADM

## 2024-05-21 RX ORDER — ONDANSETRON 4 MG/1
4 TABLET, ORALLY DISINTEGRATING ORAL EVERY 6 HOURS PRN
Qty: 30 TABLET | Refills: 0 | Status: SHIPPED | OUTPATIENT
Start: 2024-05-21

## 2024-05-21 RX ORDER — ACETAMINOPHEN 500 MG
1000 TABLET ORAL EVERY 8 HOURS
Start: 2024-05-21

## 2024-05-21 RX ADMIN — OXYCODONE HYDROCHLORIDE 10 MG: 10 TABLET ORAL at 12:47

## 2024-05-21 RX ADMIN — LOSARTAN POTASSIUM 100 MG: 50 TABLET, FILM COATED ORAL at 08:10

## 2024-05-21 RX ADMIN — METHOCARBAMOL 750 MG: 500 TABLET ORAL at 05:21

## 2024-05-21 RX ADMIN — FOLIC ACID 2000 MCG: 1 TABLET ORAL at 08:09

## 2024-05-21 RX ADMIN — HYDROCHLOROTHIAZIDE 25 MG: 25 TABLET ORAL at 08:11

## 2024-05-21 RX ADMIN — HYDROXYZINE HYDROCHLORIDE 50 MG: 25 TABLET ORAL at 08:10

## 2024-05-21 RX ADMIN — CELECOXIB 100 MG: 100 CAPSULE ORAL at 08:10

## 2024-05-21 RX ADMIN — ENOXAPARIN SODIUM 40 MG: 40 INJECTION SUBCUTANEOUS at 08:11

## 2024-05-21 RX ADMIN — LIDOCAINE 5% 2 PATCH: 700 PATCH TOPICAL at 08:09

## 2024-05-21 RX ADMIN — LORATADINE 10 MG: 10 TABLET ORAL at 11:24

## 2024-05-21 RX ADMIN — INSULIN LISPRO 1 UNITS: 100 INJECTION, SOLUTION INTRAVENOUS; SUBCUTANEOUS at 08:15

## 2024-05-21 RX ADMIN — CLONAZEPAM 1 MG: 1 TABLET ORAL at 08:11

## 2024-05-21 RX ADMIN — INSULIN LISPRO 1 UNITS: 100 INJECTION, SOLUTION INTRAVENOUS; SUBCUTANEOUS at 11:26

## 2024-05-21 RX ADMIN — ONDANSETRON 4 MG: 2 INJECTION INTRAMUSCULAR; INTRAVENOUS at 08:12

## 2024-05-21 RX ADMIN — ASPIRIN 81 MG: 81 TABLET, COATED ORAL at 08:10

## 2024-05-21 RX ADMIN — NIFEDIPINE 120 MG: 30 TABLET, EXTENDED RELEASE ORAL at 08:09

## 2024-05-21 RX ADMIN — METHOCARBAMOL 750 MG: 500 TABLET ORAL at 11:25

## 2024-05-21 RX ADMIN — SERTRALINE 100 MG: 100 TABLET, FILM COATED ORAL at 08:09

## 2024-05-21 RX ADMIN — METOPROLOL SUCCINATE 75 MG: 50 TABLET, EXTENDED RELEASE ORAL at 08:10

## 2024-05-21 RX ADMIN — GABAPENTIN 400 MG: 400 CAPSULE ORAL at 08:10

## 2024-05-21 RX ADMIN — ACETAMINOPHEN 975 MG: 325 TABLET, FILM COATED ORAL at 05:21

## 2024-05-21 RX ADMIN — OXYCODONE HYDROCHLORIDE 10 MG: 10 TABLET ORAL at 08:12

## 2024-05-21 RX ADMIN — FINASTERIDE 5 MG: 5 TABLET, FILM COATED ORAL at 08:10

## 2024-05-21 RX ADMIN — OXYCODONE HYDROCHLORIDE 10 MG: 10 TABLET ORAL at 00:57

## 2024-05-21 RX ADMIN — HYDROMORPHONE HYDROCHLORIDE 0.5 MG: 1 INJECTION, SOLUTION INTRAMUSCULAR; INTRAVENOUS; SUBCUTANEOUS at 09:39

## 2024-05-21 RX ADMIN — ACETAMINOPHEN 975 MG: 325 TABLET, FILM COATED ORAL at 12:47

## 2024-05-21 NOTE — PLAN OF CARE
Problem: Prexisting or High Potential for Compromised Skin Integrity  Goal: Skin integrity is maintained or improved  Description: INTERVENTIONS:  - Identify patients at risk for skin breakdown  - Assess and monitor skin integrity  - Assess and monitor nutrition and hydration status  - Monitor labs   - Assess for incontinence   - Turn and reposition patient  - Assist with mobility/ambulation  - Relieve pressure over bony prominences  - Avoid friction and shearing  - Provide appropriate hygiene as needed including keeping skin clean and dry  - Evaluate need for skin moisturizer/barrier cream  - Collaborate with interdisciplinary team   - Patient/family teaching  - Consider wound care consult   Outcome: Progressing     Problem: PAIN - ADULT  Goal: Verbalizes/displays adequate comfort level or baseline comfort level  Description: Interventions:  - Encourage patient to monitor pain and request assistance  - Assess pain using appropriate pain scale  - Administer analgesics based on type and severity of pain and evaluate response  - Implement non-pharmacological measures as appropriate and evaluate response  - Consider cultural and social influences on pain and pain management  - Notify physician/advanced practitioner if interventions unsuccessful or patient reports new pain  Outcome: Progressing     Problem: SAFETY ADULT  Goal: Patient will remain free of falls  Description: INTERVENTIONS:  - Educate patient/family on patient safety including physical limitations  - Instruct patient to call for assistance with activity   - Consult OT/PT to assist with strengthening/mobility   - Keep Call bell within reach  - Keep bed low and locked with side rails adjusted as appropriate  - Keep care items and personal belongings within reach  - Initiate and maintain comfort rounds  - Make Fall Risk Sign visible to staff  - Offer Toileting every 2 Hours, in advance of need  - Obtain necessary fall risk management equipment: nonskid  footwear  - Apply yellow socks and bracelet for high fall risk patients  - Consider moving patient to room near nurses station  Outcome: Progressing     Problem: DISCHARGE PLANNING  Goal: Discharge to home or other facility with appropriate resources  Description: INTERVENTIONS:  - Identify barriers to discharge w/patient and caregiver  - Arrange for needed discharge resources and transportation as appropriate  - Identify discharge learning needs (meds, wound care, etc.)  - Arrange for interpretive services to assist at discharge as needed  - Refer to Case Management Department for coordinating discharge planning if the patient needs post-hospital services based on physician/advanced practitioner order or complex needs related to functional status, cognitive ability, or social support system  Outcome: Progressing     Problem: Knowledge Deficit  Goal: Patient/family/caregiver demonstrates understanding of disease process, treatment plan, medications, and discharge instructions  Description: Complete learning assessment and assess knowledge base.  Interventions:  - Provide teaching at level of understanding  - Provide teaching via preferred learning methods  Outcome: Progressing

## 2024-05-21 NOTE — CASE MANAGEMENT
Case Management Discharge Planning Note    Patient name Juan San  Location /-01 MRN [id number]  : 1955 Date 2024       Current Admission Date: 2024  Current Admission Diagnosis:Acute on chronic bilateral low back pain with sciatica   Patient Active Problem List    Diagnosis Date Noted Date Diagnosed    Leukocytosis 2024     Lactic acidosis 2024     Type 2 diabetes mellitus with hyperglycemia, without long-term current use of insulin (HCC) 2024     Viral gastroenteritis 2024     Foraminal stenosis of lumbar region 2024     Discitis of lumbosacral region 2024     Hypochromic microcytic anemia 10/10/2023     Acute on chronic bilateral low back pain with sciatica 10/09/2023     Anxiety and depression 10/09/2023     Hypertension 10/09/2023     Benign prostatic hyperplasia with urinary retention 2023     Scrotal pain 2023     Lower urinary tract symptoms 2023     Status post lumbar spinal fusion 2023     Hyponatremia 2023     Gallstones 2023     Anemia 2023     Urinary retention 2023     PONV (postoperative nausea and vomiting)      Medical marijuana use      Chronic bilateral low back pain without sciatica 2023     Lumbar radiculopathy      Chronic pain syndrome 2022     Liver disease 08/10/2022     Bronchitis, mucopurulent recurrent (HCC) 2022     Cirrhosis, alcoholic (HCC) 2022     Diabetic peripheral neuropathy (HCC) 2022     Herniated nucleus pulposus of lumbosacral region 2022     Thyroid cancer (HCC) 2021     Alcoholism in remission (HCC) 2021     Benzodiazepine dependence (HCC) 2021     Bilateral adhesive capsulitis of shoulders 2021     DM (diabetes mellitus) (HCC) 2021     Hypercholesterolemia 2021     Lumbar spondylosis 2021       LOS (days): 3  Geometric Mean LOS (GMLOS) (days): 2.9  Days to GMLOS:0.3      OBJECTIVE:  Risk of Unplanned Readmission Score: 30.88         Current admission status: Inpatient   Preferred Pharmacy:   [facility] [address] [address]  Phone: [phone] Fax: [phone]    Primary Care Provider: Sabra Magaña DO    Primary Insurance: [employer]  Secondary Insurance: [facility]    DISCHARGE DETAILS:     IMM reviewed with patient's caregiver, patient's caregiver agrees with discharge determination.        IMM Given (Date):: 05/21/24 (101p)  IMM Given to:: Family  Family notified:: Tatyana San, spouse

## 2024-05-21 NOTE — DISCHARGE SUMMARY
"Formerly [facility]  Discharge- Juan San 1955, 68 y.o. male MRN: [id number]  Unit/Bed#: [facility] Encounter: [id number]  Primary Care Provider: Sabra Magaña DO   Date and time admitted to hospital: 5/18/2024  9:14 PM    * Acute on chronic bilateral low back pain with sciatica  Assessment & Plan  Presents to the ED with worsening lower back pain for the last week, seen in [facility] on 5/17 and discharged home  History of discitis in January 2024 requiring IV antibiotics and IR biopsy that showed no growth  - completed IV vancomycin and cefepime outpatient on 3/08/2024  History of lumbar fusion April 2023  CT L-spine: \"There is a spinal fusion spanning T12-L 1 levels.  There is a retrolisthesis of L1 on L2 and a similar retrolisthesis of L5 on S1.  Generalized sclerosis of the lumbar, sacral, and pelvic marrow.  Disc degeneration and/or discitis at that L1-L2 and L5-S1 levels.  These changes are not necessarily acute.  No paraspinous mass or hematoma\"  MRI lumbar spine  - stable findings  Upright XR stable per Neurosurgery review.   Suspect musculoskeletal strain in the setting of recent gastroenteritis.   Pain control: Oxy 5/10 as needed and Dilaudid for breakthrough pain, Tylenol 975 every 8 hours  Ordered vancomycin until MRI obtained due to questionable discitis on CT scan, however patient without any fevers or chills, ESR/CRP normal, procal normal, will monitor off antibiotics for now.   Outpatient follow up with neurosurgery     Discitis of lumbosacral region  Assessment & Plan  Admission in January 2024 requiring IR biopsy and IV vancomycin and cefepime  Neurosurgery consulted due to CT findings on admit  No new findings per Neurosurgery  Outpatient follow up  Monitor off antibiotics     Leukocytosis  Assessment & Plan  WBC at 14.60K on admit , now 9.53  High suspicion that leukocytosis is actually secondary to viral gastroenteritis  Stable off IV " vancomycin    Lactic acidosis  Assessment & Plan  Lactic acid at 2.8, improved status post IVF to 1.0  Lactic acidosis secondary to dehydration from GI losses from viral gastroenteritis    Viral gastroenteritis  Assessment & Plan  Developed nausea and emesis approximately 3 days ago with generalized abdominal pain  Wife with similar symptoms  Suspect viral gastroenteritis  improved    Type 2 diabetes mellitus with hyperglycemia, without long-term current use of insulin (HCC)  Assessment & Plan  Lab Results   Component Value Date    HGBA1C 7.9 (H) 05/19/2024       Recent Labs     05/20/24  1650 05/20/24  2128 05/21/24  0748 05/21/24  1051   POCGLU 204* 270* 179* 152*         Blood Sugar Average: Last 72 hrs:  (P) [id number] regimen: Metformin 1000 mg twice daily and Jardiance 10 Mg daily  Placed on SSI while inpatient  Update hemoglobin A1c    Hypertension  Assessment & Plan  Home regimen: Hyzaar 100-25 Mg daily, nifedipine 120 Mg daily, metoprolol 75 Mg daily  BP elevated, suspect multifactorial as patient had not taken all of his BP meds yet and he was in pain  Will continue home medications and trend BP per unit protocol    Cirrhosis, alcoholic (HCC)  Assessment & Plan  Managed by PCP  Continue outpatient follow-up    Thyroid cancer (HCC)  Assessment & Plan  Status post partial thyroidectomy  Not maintained on Synthroid as levels have been stable  Follows with Horacio [facility], remains in remission      Medical Problems       Resolved Problems  Date Reviewed: 5/21/2024   None       Discharging Physician / Practitioner: Janee Zuleta PA-C  PCP: Sabra Magaña DO  Admission Date:   Admission Orders (From admission, onward)       Ordered        05/18/24 6104  INPATIENT ADMISSION  Once                          Discharge Date: 05/21/24    Consultations During Hospital Stay:  Neurosurgery    Procedures Performed:     XR lumbar spine -stable per neurosurgery review    MRI lumbar spine  Stable postoperative  changes status post fusion from T12-S1. Stable loosening of the S1 pedicle screws with surrounding marrow edema that may be due to infection but could also be due to reactive edema  Sequela of discitis/osteomyelitis again seen from L1-2 to L5-S1.  Residual enhancing ventral epidural soft tissue at L5-S1 may represent granulation tissue and/or phlegmon. Similar prevertebral enhancement at L5 and S1  No abscess.  No compression of the conus or cauda equina    CT lumbar spine  Stable posterior fusion hardware T12-S1 with decompressive laminectomies at the L1 through the L5 levels. Persistent lucency surrounding the bilateral S1 screws suggestive of loosening.     Prior discitis/osteomyelitis at the L5-S1 level with interval increased endplate irregularity and lucency along the endplates compared to 1/24/2024 suggestive of worsening disease. There is again suggestion of moderate ventral epidural phlegmonous   changes with narrowing of the thecal sac. Follow-up MRI of the lumbar spine with contrast is recommended.     Persistent endplate irregularity at the L1-2, L2-3, and L4-5 levels likely related to known subacute discitis/osteomyelitis.    Significant Findings / Test Results:   See above    Incidental Findings:   none     Test Results Pending at Discharge (will require follow up):   none     Outpatient Tests Requested:  none    Complications:  none    Reason for Admission: back pain    Hospital Course:   Juan San is a 68 y.o. male patient who originally presented to the hospital on 5/18/2024 due to acute on chronic back pain.  Patient has a history of recent discitis and recently completed antibiotics on 3/8/2024.  He was noted to have a leukocytosis and persistent discitis symptoms on CT scanning, therefore he was admitted for infectious workup.  Neurosurgery was consulted.  MRI of the lumbar spine was obtained.  Neurosurgery reviewed MRI and felt that his findings were stable.  No neurosurgery intervention  "needed.  They will follow-up with the patient next week.  Doubt recurrent infection given ESR/CRP within normal limits, procalcitonin normal, leukocytosis resolved, and patient remained stable off IV antibiotics.  Suspect patient's leukocytosis was from a recent gastroenteritis.  Patient also likely had an exacerbation in his back pain secondary to vomiting from the gastroenteritis.  On day of discharge, patient is nontoxic-appearing, afebrile, leukocytosis resolved.  Discharge home with a weeks worth of oxycodone.  He will follow-up with neurosurgery next week.  Patient was instructed to return to the hospital if he develops any fever.    Please see above list of diagnoses and related plan for additional information.     Condition at Discharge: good    Discharge Day Visit / Exam:   Subjective: Patient complains of bilateral sciatica pain.  He is ambulating without difficulty.  States the pain medicine does help when he takes it.  Vitals: Blood Pressure: (!) 186/88 (05/21/24 0800)  Pulse: 91 (05/21/24 0800)  Temperature: (!) 96.8 °F (36 °C) (05/20/24 1623)  Temp Source: Temporal (05/20/24 1623)  Respirations: 18 (05/19/24 1449)  Height: 5' 9\" (175.3 cm) (05/19/24 0105)  Weight - Scale: 77.8 kg (171 lb 8.3 oz) (05/19/24 0105)  SpO2: 99 % (05/21/24 0800)  Exam:   Physical Exam  Constitutional:       General: He is not in acute distress.     Appearance: He is well-developed.   HENT:      Head: Normocephalic and atraumatic.   Cardiovascular:      Rate and Rhythm: Normal rate and regular rhythm.      Heart sounds: No murmur heard.  Pulmonary:      Effort: Pulmonary effort is normal. No respiratory distress.      Breath sounds: Normal breath sounds. No wheezing or rales.   Abdominal:      General: Bowel sounds are normal. There is no distension.      Palpations: Abdomen is soft.   Musculoskeletal:      Cervical back: Normal range of motion and neck supple.   Skin:     General: Skin is warm and dry.      Findings: No rash. "   Neurological:      Mental Status: He is alert and oriented to person, place, and time.      Cranial Nerves: No cranial nerve deficit.          Discussion with Family: Updated  (wife) at bedside.    Discharge instructions/Information to patient and family:   See after visit summary for information provided to patient and family.      Provisions for Follow-Up Care:  See after visit summary for information related to follow-up care and any pertinent home health orders.      Mobility at time of Discharge:   Basic Mobility Inpatient Raw Score: 23  JH-HLM Goal: 7: Walk 25 feet or more  JH-HLM Achieved: 7: Walk 25 feet or more  HLM Goal achieved. Continue to encourage appropriate mobility.     Disposition:   Home    Planned Readmission: none     Discharge Statement:  I spent 45 minutes discharging the patient. This time was spent on the day of discharge. I had direct contact with the patient on the day of discharge. Greater than 50% of the total time was spent examining patient, answering all patient questions, arranging and discussing plan of care with patient as well as directly providing post-discharge instructions.  Additional time then spent on discharge activities.    Discharge Medications:  See after visit summary for reconciled discharge medications provided to patient and/or family.      **Please Note: This note may have been constructed using a voice recognition system**

## 2024-05-21 NOTE — ASSESSMENT & PLAN NOTE
Status post partial thyroidectomy  Not maintained on Synthroid as levels have been stable  Follows with [facility], remains in remission

## 2024-05-21 NOTE — PLAN OF CARE
Problem: Prexisting or High Potential for Compromised Skin Integrity  Goal: Skin integrity is maintained or improved  Description: INTERVENTIONS:  - Identify patients at risk for skin breakdown  - Assess and monitor skin integrity  - Assess and monitor nutrition and hydration status  - Monitor labs   - Assess for incontinence   - Turn and reposition patient  - Assist with mobility/ambulation  - Relieve pressure over bony prominences  - Avoid friction and shearing  - Provide appropriate hygiene as needed including keeping skin clean and dry  - Evaluate need for skin moisturizer/barrier cream  - Collaborate with interdisciplinary team   - Patient/family teaching  - Consider wound care consult   5/21/2024 1241 by Verito Roper RN  Outcome: Adequate for Discharge  5/21/2024 1023 by Verito Roper RN  Outcome: Progressing     Problem: PAIN - ADULT  Goal: Verbalizes/displays adequate comfort level or baseline comfort level  Description: Interventions:  - Encourage patient to monitor pain and request assistance  - Assess pain using appropriate pain scale  - Administer analgesics based on type and severity of pain and evaluate response  - Implement non-pharmacological measures as appropriate and evaluate response  - Consider cultural and social influences on pain and pain management  - Notify physician/advanced practitioner if interventions unsuccessful or patient reports new pain  5/21/2024 1241 by Verito Roper RN  Outcome: Adequate for Discharge  5/21/2024 1023 by Verito Roper RN  Outcome: Progressing     Problem: SAFETY ADULT  Goal: Patient will remain free of falls  Description: INTERVENTIONS:  - Educate patient/family on patient safety including physical limitations  - Instruct patient to call for assistance with activity   - Consult OT/PT to assist with strengthening/mobility   - Keep Call bell within reach  - Keep bed low and locked with side rails adjusted as appropriate  - Keep care items and personal  belongings within reach  - Initiate and maintain comfort rounds  - Make Fall Risk Sign visible to staff  - Offer Toileting every 2 Hours, in advance of need  - Obtain necessary fall risk management equipment: nonskid footwear  - Apply yellow socks and bracelet for high fall risk patients  - Consider moving patient to room near nurses station  5/21/2024 1241 by Verito Roper RN  Outcome: Adequate for Discharge  5/21/2024 1023 by Verito Roper RN  Outcome: Progressing     Problem: DISCHARGE PLANNING  Goal: Discharge to home or other facility with appropriate resources  Description: INTERVENTIONS:  - Identify barriers to discharge w/patient and caregiver  - Arrange for needed discharge resources and transportation as appropriate  - Identify discharge learning needs (meds, wound care, etc.)  - Arrange for interpretive services to assist at discharge as needed  - Refer to Case Management Department for coordinating discharge planning if the patient needs post-hospital services based on physician/advanced practitioner order or complex needs related to functional status, cognitive ability, or social support system  5/21/2024 1241 by Verito Roper RN  Outcome: Adequate for Discharge  5/21/2024 1023 by Verito Roper RN  Outcome: Progressing     Problem: Knowledge Deficit  Goal: Patient/family/caregiver demonstrates understanding of disease process, treatment plan, medications, and discharge instructions  Description: Complete learning assessment and assess knowledge base.  Interventions:  - Provide teaching at level of understanding  - Provide teaching via preferred learning methods  5/21/2024 1241 by Verito Roper RN  Outcome: Adequate for Discharge  5/21/2024 1023 by Verito Roper RN  Outcome: Progressing     Problem: METABOLIC, FLUID AND ELECTROLYTES - ADULT  Goal: Fluid balance maintained  Description: INTERVENTIONS:  - Monitor labs   - Monitor I/O and WT  - Instruct patient on fluid and nutrition as appropriate  -  Assess for signs & symptoms of volume excess or deficit  5/21/2024 1241 by Verito Roper RN  Outcome: Adequate for Discharge  5/21/2024 1023 by Verito Roper RN  Outcome: Progressing  Goal: Glucose maintained within target range  Description: INTERVENTIONS:  - Monitor Blood Glucose as ordered  - Assess for signs and symptoms of hyperglycemia and hypoglycemia  - Administer ordered medications to maintain glucose within target range  - Assess nutritional intake and initiate nutrition service referral as needed  5/21/2024 1241 by Verito Roper RN  Outcome: Adequate for Discharge  5/21/2024 1023 by Verito Roper RN  Outcome: Progressing     Problem: MUSCULOSKELETAL - ADULT  Goal: Maintain or return mobility to safest level of function  Description: INTERVENTIONS:  - Assess patient's ability to carry out ADLs; assess patient's baseline for ADL function and identify physical deficits which impact ability to perform ADLs (bathing, care of mouth/teeth, toileting, grooming, dressing, etc.)  - Assess/evaluate cause of self-care deficits   - Assess range of motion  - Assess patient's mobility  - Assess patient's need for assistive devices and provide as appropriate  - Encourage maximum independence but intervene and supervise when necessary  - Involve family in performance of ADLs  - Assess for home care needs following discharge   - Consider OT consult to assist with ADL evaluation and planning for discharge  - Provide patient education as appropriate  5/21/2024 1241 by Verito Roper RN  Outcome: Adequate for Discharge  5/21/2024 1023 by Verito Roper RN  Outcome: Progressing     Problem: Potential for Falls  Goal: Patient will remain free of falls  Description: INTERVENTIONS:  - Educate patient/family on patient safety including physical limitations  - Instruct patient to call for assistance with activity   - Consult OT/PT to assist with strengthening/mobility   - Keep Call bell within reach  - Keep bed low and locked  with side rails adjusted as appropriate  - Keep care items and personal belongings within reach  - Initiate and maintain comfort rounds  - Make Fall Risk Sign visible to staff  - Offer Toileting every 2 Hours, in advance of need  - Obtain necessary fall risk management equipment: nonskid footwear  - Apply yellow socks and bracelet for high fall risk patients  - Consider moving patient to room near nurses station  5/21/2024 1241 by Verito Roper RN  Outcome: Adequate for Discharge  5/21/2024 1023 by Verito Roper, RN  Outcome: Progressing

## 2024-05-21 NOTE — ASSESSMENT & PLAN NOTE
"Presents to the ED with worsening lower back pain for the last week, seen in [facility] on 5/17 and discharged home  History of discitis in January 2024 requiring IV antibiotics and IR biopsy that showed no growth  - completed IV vancomycin and cefepime outpatient on 3/08/2024  History of lumbar fusion April 2023  CT L-spine: \"There is a spinal fusion spanning T12-L 1 levels.  There is a retrolisthesis of L1 on L2 and a similar retrolisthesis of L5 on S1.  Generalized sclerosis of the lumbar, sacral, and pelvic marrow.  Disc degeneration and/or discitis at that L1-L2 and L5-S1 levels.  These changes are not necessarily acute.  No paraspinous mass or hematoma\"  MRI lumbar spine  - stable findings  Upright XR stable per Neurosurgery review.   Suspect musculoskeletal strain in the setting of recent gastroenteritis.   Pain control: Oxy 5/10 as needed and Dilaudid for breakthrough pain, Tylenol 975 every 8 hours  Ordered vancomycin until MRI obtained due to questionable discitis on CT scan, however patient without any fevers or chills, ESR/CRP normal, procal normal, will monitor off antibiotics for now.   Outpatient follow up with neurosurgery   "

## 2024-05-21 NOTE — ASSESSMENT & PLAN NOTE
WBC at 14.60K on admit , now 9.53  High suspicion that leukocytosis is actually secondary to viral gastroenteritis  Stable off IV vancomycin

## 2024-05-21 NOTE — CONSULTS
Vancomycin IV Pharmacy-to-Dose Consultation     Vancomycin has been discontinued.  Pharmacy will sign off.  Please contact or re-consult with questions.    Sveta Stanley, Pharmacist

## 2024-05-22 NOTE — TELEPHONE ENCOUNTER
05/22/2024- CALLED PT AND LEFT MESSAGE ON MACHINE CONFIRMING 05/29/2024 APT W/ MRI NEEDED PRIOR. MRI WAS SCHEDULED AND COMPLETED ON 05/19/2024

## 2024-05-22 NOTE — CASE MANAGEMENT
Case Management Progress Note    Patient name Juan San  Location /-01 MRN [id number]  : 1955 Date 2024       LOS (days): 3  Geometric Mean LOS (GMLOS) (days): 2.9  Days to GMLOS:0.3        OBJECTIVE:        Current admission status: Inpatient  Preferred Pharmacy:   [facility] [address] [address]  Phone: [phone] Fax: [phone]    Primary Care Provider: Sabra Magaña DO    [facility]  Secondary Insurance: [facility]    PROGRESS NOTE:  Notification made to OP CM Handoff: [facility] OP CM regarding discharge planning and disposition.   Spoke with Yoly TIJERINA

## 2024-05-23 RX ORDER — METOPROLOL SUCCINATE 50 MG/1
TABLET, EXTENDED RELEASE ORAL
COMMUNITY
Start: 2024-04-03

## 2024-05-23 RX ORDER — CEFEPIME HYDROCHLORIDE 2 G/1
INJECTION, POWDER, FOR SOLUTION INTRAVENOUS
COMMUNITY
Start: 2024-03-07

## 2024-05-23 RX ORDER — AZELASTINE HYDROCHLORIDE 137 UG/1
SPRAY, METERED NASAL EVERY 12 HOURS
COMMUNITY

## 2024-05-23 RX ORDER — VANCOMYCIN HYDROCHLORIDE 10 G/1
INJECTION, POWDER, LYOPHILIZED, FOR SOLUTION INTRAVENOUS
COMMUNITY
Start: 2024-02-29

## 2024-05-23 NOTE — UTILIZATION REVIEW
NOTIFICATION OF ADMISSION DISCHARGE   This is a Notification of Discharge from [facility]. Please be advised that this patient has been discharge from our facility. Below you will find the admission and discharge date and time including the patient’s disposition.   UTILIZATION REVIEW CONTACT:  Ashly Resendiz  Utilization   Network Utilization Review Department  Phone: [phone] x carefully listen to the prompts. All voicemails are confidential.  Email: [email or web address]     ADMISSION INFORMATION  PRESENTATION DATE: 5/18/2024  9:14 PM  OBERVATION ADMISSION DATE:   INPATIENT ADMISSION DATE: 5/18/24 10:54 PM   DISCHARGE DATE: 5/21/2024  2:14 PM   DISPOSITION:Home/Self Care    Network Utilization Review Department  ATTENTION: Please call with any questions or concerns to [phone] and carefully listen to the prompts so that you are directed to the right person. All voicemails are confidential.   For Discharge needs, contact Care Management DC Support Team at [phone] opt. 2  Send all requests for admission clinical reviews, approved or denied determinations and any other requests to dedicated fax number below belonging to the campus where the patient is receiving treatment. List of dedicated fax numbers for the Facilities:  FACILITY NAME UR FAX NUMBER   ADMISSION DENIALS (Administrative/Medical Necessity) [phone]   DISCHARGE SUPPORT TEAM ([facility]) [phone]   PARENT CHILD HEALTH (Maternity/NICU/Pediatrics) [phone]   [facility] [phone]   [facility] [phone]   [facility] [phone]   [facility] [phone]   [facility] [phone]   [facility] [phone]   [facility] [phone]   [facility] [phone]  "  [facility] [phone]   [facility] [phone]   [facility] [phone]   [facility] [phone]        [facility]  Discharge- Juan San 1955, 68 y.o. male MRN: [id number]  Unit/Bed#: MS Camilo-01 Encounter: [id number]  Primary Care Provider: Sabra Magaña DO   Date and time admitted to hospital: 5/18/2024  9:14 PM     * Acute on chronic bilateral low back pain with sciatica  Assessment & Plan  Presents to the ED with worsening lower back pain for the last week, seen in [facility] on 5/17 and discharged home  History of discitis in January 2024 requiring IV antibiotics and IR biopsy that showed no growth  - completed IV vancomycin and cefepime outpatient on 3/08/2024  History of lumbar fusion April 2023  CT L-spine: \"There is a spinal fusion spanning T12-L 1 levels.  There is a retrolisthesis of L1 on L2 and a similar retrolisthesis of L5 on S1.  Generalized sclerosis of the lumbar, sacral, and pelvic marrow.  Disc degeneration and/or discitis at that L1-L2 and L5-S1 levels.  These changes are not necessarily acute.  No paraspinous mass or hematoma\"  MRI lumbar spine  - stable findings  Upright XR stable per Neurosurgery review.   Suspect musculoskeletal strain in the setting of recent gastroenteritis.   Pain control: Oxy 5/10 as needed and Dilaudid for breakthrough pain, Tylenol 975 every 8 hours  Ordered vancomycin until MRI obtained due to questionable discitis on CT scan, however patient without any fevers or chills, ESR/CRP normal, procal normal, will monitor off antibiotics for now.   Outpatient follow up with neurosurgery      Discitis of lumbosacral region  Assessment & Plan  Admission in January 2024 requiring IR biopsy and IV vancomycin and cefepime  Neurosurgery consulted due to CT findings on admit  No new findings per " Neurosurgery  Outpatient follow up  Monitor off antibiotics      Leukocytosis  Assessment & Plan  WBC at 14.60K on admit , now 9.53  High suspicion that leukocytosis is actually secondary to viral gastroenteritis  Stable off IV vancomycin     Lactic acidosis  Assessment & Plan  Lactic acid at 2.8, improved status post IVF to 1.0  Lactic acidosis secondary to dehydration from GI losses from viral gastroenteritis     Viral gastroenteritis  Assessment & Plan  Developed nausea and emesis approximately 3 days ago with generalized abdominal pain  Wife with similar symptoms  Suspect viral gastroenteritis  improved     Type 2 diabetes mellitus with hyperglycemia, without long-term current use of insulin (HCC)  Assessment & Plan        Lab Results   Component Value Date     HGBA1C 7.9 (H) 05/19/2024                Recent Labs     05/20/24  1650 05/20/24  2128 05/21/24  0748 05/21/24  1051   POCGLU 204* 270* 179* 152*            Blood Sugar Average: Last 72 hrs:  (P) [id number] regimen: Metformin 1000 mg twice daily and Jardiance 10 Mg daily  Placed on SSI while inpatient  Update hemoglobin A1c     Hypertension  Assessment & Plan  Home regimen: Hyzaar 100-25 Mg daily, nifedipine 120 Mg daily, metoprolol 75 Mg daily  BP elevated, suspect multifactorial as patient had not taken all of his BP meds yet and he was in pain  Will continue home medications and trend BP per unit protocol     Cirrhosis, alcoholic (HCC)  Assessment & Plan  Managed by PCP  Continue outpatient follow-up     Thyroid cancer (HCC)  Assessment & Plan  Status post partial thyroidectomy  Not maintained on Synthroid as levels have been stable  Follows with Horacio Jackson, remains in remission        Medical Problems         Resolved Problems  Date Reviewed: 5/21/2024   None         Discharging Physician / Practitioner: Janee Zuleta PA-C  PCP: Sabra Magaña DO  Admission Date:   Admission Orders (From admission, onward)          Ordered          05/18/24 2254   INPATIENT ADMISSION  Once                               Discharge Date: 05/21/24     Consultations During Hospital Stay:  Neurosurgery     Procedures Performed:      XR lumbar spine -stable per neurosurgery review     MRI lumbar spine  Stable postoperative changes status post fusion from T12-S1. Stable loosening of the S1 pedicle screws with surrounding marrow edema that may be due to infection but could also be due to reactive edema  Sequela of discitis/osteomyelitis again seen from L1-2 to L5-S1.  Residual enhancing ventral epidural soft tissue at L5-S1 may represent granulation tissue and/or phlegmon. Similar prevertebral enhancement at L5 and S1  No abscess.  No compression of the conus or cauda equina     CT lumbar spine  Stable posterior fusion hardware T12-S1 with decompressive laminectomies at the L1 through the L5 levels. Persistent lucency surrounding the bilateral S1 screws suggestive of loosening.     Prior discitis/osteomyelitis at the L5-S1 level with interval increased endplate irregularity and lucency along the endplates compared to 1/24/2024 suggestive of worsening disease. There is again suggestion of moderate ventral epidural phlegmonous   changes with narrowing of the thecal sac. Follow-up MRI of the lumbar spine with contrast is recommended.     Persistent endplate irregularity at the L1-2, L2-3, and L4-5 levels likely related to known subacute discitis/osteomyelitis.     Significant Findings / Test Results:   See above     Incidental Findings:   none      Test Results Pending at Discharge (will require follow up):   none     Outpatient Tests Requested:  none     Complications:  none     Reason for Admission: back pain     Hospital Course:   Juan San is a 68 y.o. male patient who originally presented to the hospital on 5/18/2024 due to acute on chronic back pain.  Patient has a history of recent discitis and recently completed antibiotics on 3/8/2024.  He was noted to have  "a leukocytosis and persistent discitis symptoms on CT scanning, therefore he was admitted for infectious workup.  Neurosurgery was consulted.  MRI of the lumbar spine was obtained.  Neurosurgery reviewed MRI and felt that his findings were stable.  No neurosurgery intervention needed.  They will follow-up with the patient next week.  Doubt recurrent infection given ESR/CRP within normal limits, procalcitonin normal, leukocytosis resolved, and patient remained stable off IV antibiotics.  Suspect patient's leukocytosis was from a recent gastroenteritis.  Patient also likely had an exacerbation in his back pain secondary to vomiting from the gastroenteritis.  On day of discharge, patient is nontoxic-appearing, afebrile, leukocytosis resolved.  Discharge home with a weeks worth of oxycodone.  He will follow-up with neurosurgery next week.  Patient was instructed to return to the hospital if he develops any fever.     Please see above list of diagnoses and related plan for additional information.      Condition at Discharge: good     Discharge Day Visit / Exam:   Subjective: Patient complains of bilateral sciatica pain.  He is ambulating without difficulty.  States the pain medicine does help when he takes it.  Vitals: Blood Pressure: (!) 186/88 (05/21/24 0800)  Pulse: 91 (05/21/24 0800)  Temperature: (!) 96.8 °F (36 °C) (05/20/24 1623)  Temp Source: Temporal (05/20/24 1623)  Respirations: 18 (05/19/24 1449)  Height: 5' 9\" (175.3 cm) (05/19/24 0105)  Weight - Scale: 77.8 kg (171 lb 8.3 oz) (05/19/24 0105)  SpO2: 99 % (05/21/24 0800)  Exam:   Physical Exam  Constitutional:       General: He is not in acute distress.     Appearance: He is well-developed.   HENT:      Head: Normocephalic and atraumatic.   Cardiovascular:      Rate and Rhythm: Normal rate and regular rhythm.      Heart sounds: No murmur heard.  Pulmonary:      Effort: Pulmonary effort is normal. No respiratory distress.      Breath sounds: Normal breath " sounds. No wheezing or rales.   Abdominal:      General: Bowel sounds are normal. There is no distension.      Palpations: Abdomen is soft.   Musculoskeletal:      Cervical back: Normal range of motion and neck supple.   Skin:     General: Skin is warm and dry.      Findings: No rash.   Neurological:      Mental Status: He is alert and oriented to person, place, and time.      Cranial Nerves: No cranial nerve deficit.            Discussion with Family: Updated  (wife) at bedside.     Discharge instructions/Information to patient and family:   See after visit summary for information provided to patient and family.       Provisions for Follow-Up Care:  See after visit summary for information related to follow-up care and any pertinent home health orders.       Mobility at time of Discharge:   Basic Mobility Inpatient Raw Score: 23  JH-HLM Goal: 7: Walk 25 feet or more  JH-HLM Achieved: 7: Walk 25 feet or more  HLM Goal achieved. Continue to encourage appropriate mobility.     Disposition:   Home     Planned Readmission: none     Discharge Statement:  I spent 45 minutes discharging the patient. This time was spent on the day of discharge. I had direct contact with the patient on the day of discharge. Greater than 50% of the total time was spent examining patient, answering all patient questions, arranging and discussing plan of care with patient as well as directly providing post-discharge instructions.  Additional time then spent on discharge activities.     Discharge Medications:  See after visit summary for reconciled discharge medications provided to patient and/or family.       **Please Note: This note may have been constructed using a voice recognition system**

## 2024-05-23 NOTE — TELEPHONE ENCOUNTER
PHQ-9 Total Score: 18 (1/26/2024 11:03 AM)  Thoughts that you would be better off dead, or of hurting yourself in some way: 0 (1/26/2024 11:03 AM)  - Mood surrounding alcohol use, would like to address this and then consider alternative medications for mood in future  - Follow up in 1 mo   Patient is being referred to a orthopedics. Please schedule accordingly.    [facility]   [phone]

## 2024-05-23 NOTE — PROGRESS NOTES
UROLOGY FOLLOW-UP ENCOUNTER    Juan San is a 68 y.o. male with BPH requiring CIC    Anticoagulation: ASA81    March 2023: Surgery with Dr. Moraes at [facility]. Underwent L1-S1 posterior decompression with fixation fusion.     Required CIC in the postoperative period.  When he was discharged to rehab, he was told to stop his catheterizations.     Upon urologic follow-up, he was found to be in retention and was told to start self-catheterization.     Dr. Rice performed cysto 7/18/23. Noted to have large prostate (140 g on imaging). Lateral lobe coaptation. No distinct median lobe appreciated.     Started CIC July 2023. Also able to void on his own.     As of August 2023 visit: After first void in AM, once in PM, and last void before bed, he does CIC for residual. These residuals range from  cc. Most catheterization are around 200 cc.       Stopped CIC around Nov 2023        UDS 12/7/23:  Uroflow:       Voided volume:    388.2 ml       Max flow rate:       14.1  ml/sec       Average flow rate:   5.2  ml/sec       PVR:  175   ml          CMG:       Position:  sitting           Fill sensation:    44 ml,    pdet -5   cmH2O       First urge:          67 ml,    pdet  -1  cmH2O        Normal urge:    227 ml,    pdet  -2  cmH2O       Must urge:         not reached        Permission to void first fill:    230 ml,     pdet -2 cmH2O           Max pdet during void    52 cm H2O       Voided volume:    222 ml     2nd fill sensations:       Fill sensation:     62 ml,  pdet -9 cmH2O       First urge:          91 ml,  pdet -2 cmH2O       Normal urge:    200 ml,  pdet  9 cmH2O         Permission to void 2nd fill:  301 ml,  pdet -3 cmH2O       Max pdet during void:    50 cmH2O       Voided volume:       302 ml     Bladder stability:     unstable at 207 ml  only without leakage during 2nd fill     Compliance:  normal         EMG activity:       Normal during filling,        abnormal during voiding     Comments:  After  initial fill and instructed to void with normal urge, fill was restarted    Sensory urgency  with normal urge              DI noted only 1x during 2nd fill without leakage              + EMG during voiding              Calculated PVR at end of test was 0 ml    Admission mid May 2024 for exacerbation of lower back pain with sciatica    U dip neg on 5/24/24    PVR 64 cc on 5/24/24    Office visit 5/24/24: Still on finasteride and Flomax. Overall happy with voiding. Nocturia x1. Feels like he empties his bladder. Good stream.      Assessment and plan:     BPH/LUTS    Urologic history as detailed above.  Of note, since I saw him in August and [place], he has weaned off his CIC since November 2023.  As instructed he got his urodynamic testing in December 2023.  This essentially showed that he empties his bladder well.  There was some questionable EMG activity with voiding, but I did explain to him that this could be artifact.  He had some sensory urgency, but this is around 200 cc, which I explained to him is not severe.    Overall, he is very happy with his voiding and happy to continue with the Flomax and finasteride.  His PVR was in the 60s today.  I explained that he appears to empty his bladder well.  He only wakes up once a night to urinate.  He feels like he has a good stream.    He is happy to continue with medical management and avoid surgery, I happened to agree with him on this.    Since he lives close to [place], we will set him up for a P visit in [place] in about 9 months.  If his voiding is good at that time, he can continue yearly visits and continue the Flomax and finasteride.  If his voiding gets worse, he can be referred back to me for surgical discussion.  Given the size of his prostate, if he did need an outlet surgery, I would likely lean towards HoLEP for him.    We also reviewed his urine dip today.  This was negative for infection.    Diabetic peripheral neuropathy    Recently seen in the  "hospital for sciatica.  Follows with PCP who will manage this.          PLAN  -Cont finasteride and Flomax  -AP fu in around 9 months for Pvr and symptom check. If happy with symptoms, can go on yearly visits. If unhappy, can refer back to me for surgical discussion.        Patient's wife, Tatyana, present in office today and assisted with history        In our clinic encounter today we:  -Reviewed the following in-office tests: u dip, pvr  -Reviewed the following imaging tests: urodynamics  -I have reviewed my interpretation of the following imaging tests: urodynamics  -Managed the following medications: Continued on Flomax and finasteride        Portions of the above record have been created with voice recognition software.  Occasional wrong word or \"sound alike\" substitution may have occurred due to the inherent limitations of voice recognition software.  Read the chart carefully and recognize, using context, where substitution may have occurred.      Gurinder Duque, DO        Chief Complaint     BPH/LUTS      History of Present Illness     See summary above    No fevers or chills        The following portions of the patient's history were reviewed and updated as appropriate: allergies, current medications, past family history, past medical history, past social history, past surgical history and problem list.        Review of Systems     Review of Systems   Constitutional:  Negative for chills and fever.   Respiratory:  Negative for cough and shortness of breath.    Genitourinary:  Negative for dysuria and hematuria.   Neurological:  Negative for dizziness and headaches.   Psychiatric/Behavioral:  Negative for agitation and behavioral problems.            Allergies     Allergies   Allergen Reactions    Abilify [Aripiprazole] Tremor     Shaking      Cephalexin Diarrhea    Molds [employer] Smuts Allergic Rhinitis    Pregabalin Tremor     Lyrica - shaking feeling       Physical Exam     Physical Exam  Constitutional:  " "     General: He is not in acute distress.  HENT:      Head: Normocephalic and atraumatic.   Pulmonary:      Effort: Pulmonary effort is normal. No respiratory distress.   Abdominal:      General: Abdomen is flat.      Palpations: Abdomen is soft.      Tenderness: There is no right CVA tenderness or left CVA tenderness.   Skin:     General: Skin is warm and dry.   Neurological:      Mental Status: He is alert and oriented to person, place, and time.   Psychiatric:         Mood and Affect: Mood normal.         Behavior: Behavior normal.             Vital Signs  Vitals:    05/24/24 0828   BP: 156/64   BP Location: Left arm   Patient Position: Sitting   Cuff Size: Standard   Pulse: 81   SpO2: 98%   Weight: 80.7 kg (178 lb)   Height: 5' 9\" (1.753 m)         Current Medications       Current Outpatient Medications:     ACCU-CHEK FABIANO PLUS test strip, 4 (four) times a day, Disp: , Rfl: 1    ACCU-CHEK FASTCLIX LANCETS MISC, 4 (four) times a day, Disp: , Rfl: 1    acetaminophen (TYLENOL) 500 mg tablet, Take 2 tablets (1,000 mg total) by mouth every 8 (eight) hours 500 mg tablet, Disp: , Rfl:     ceFEPime (MAXIPIME) 2 g injection, , Disp: , Rfl:     celecoxib (CeleBREX) 100 mg capsule, Take 1 capsule (100 mg total) by mouth 2 (two) times a day Take with food, Disp: 60 capsule, Rfl: 3    clonazePAM (KlonoPIN) 1 mg tablet, Take 1 mg by mouth 2 (two) times a day & 3rd pill PRN, Disp: , Rfl:     finasteride (PROSCAR) 5 mg tablet, Take 1 tablet (5 mg total) by mouth daily, Disp: 90 tablet, Rfl: 3    fluticasone (FLONASE) 50 mcg/act nasal spray, 1 spray into each nostril daily as needed, Disp: , Rfl:     folic acid (FOLVITE) 1 mg tablet, Take 2,000 mcg by mouth daily, Disp: , Rfl: 6    gabapentin (NEURONTIN) 400 mg capsule, Take 1 capsule (400 mg total) by mouth 2 (two) times a day, Disp: 60 capsule, Rfl: 5    GNP Aspirin Low Dose 81 MG EC tablet, Take 1 tablet (81 mg total) by mouth daily Do not start before April 25, 2023., " Disp: , Rfl: 0    hydrOXYzine pamoate (VISTARIL) 50 mg capsule, Take 50 mg by mouth 2 (two) times a day. Indications: Feeling Anxious, Disp: , Rfl:     Jardiance 10 MG TABS, Take 10 mg by mouth every morning, Disp: , Rfl:     ketoconazole (NIZORAL) 2 % shampoo, Apply 1 application. topically daily Use as directed, Disp: , Rfl: 6    lidocaine (LIDODERM) 5 %, Apply 1 patch topically over 12 hours daily Remove & Discard patch within 12 hours or as directed by MD, Disp: 15 patch, Rfl: 0    loratadine (CLARITIN) 10 mg tablet, Take 10 mg by mouth daily. [facility]  Indications: Hayfever, Disp: , Rfl:     losartan-hydrochlorothiazide (HYZAAR) 100-25 MG per tablet, Take 1 tablet by mouth every morning, Disp: , Rfl: 0    lovastatin (MEVACOR) 20 mg tablet, Take 20 mg by mouth every morning, Disp: , Rfl: 3    metFORMIN (GLUCOPHAGE) 1000 MG tablet, Take 1,000 mg by mouth 2 (two) times a day with meals , Disp: , Rfl:     methocarbamol (ROBAXIN) 750 mg tablet, Take 1 tablet (750 mg total) by mouth every 6 (six) hours as needed for muscle spasms, Disp: , Rfl:     metoprolol succinate (TOPROL-XL) 50 mg 24 hr tablet, TAKE 1 AND 1/2 TABLET BY MOUTH ONCE DAILY, Disp: , Rfl:     mirtazapine (REMERON) 45 MG tablet, Take 45 mg by mouth daily at bedtime, Disp: , Rfl: 1    NIFEdipine (PROCARDIA XL) 30 mg 24 hr tablet, TAKE 1 TABLET BY MOUTH EVERY DAY IN ADDITION TO 90MG FOR A TOTAL OF 120MG EVERY DAY, Disp: , Rfl:     NIFEdipine (PROCARDIA XL) 90 mg 24 hr tablet, Take 120 mg by mouth every morning Takes 90 mg and 30 mg =120 mg every AM, Disp: , Rfl: 3    ondansetron (Zofran ODT) 4 mg disintegrating tablet, Take 1 tablet (4 mg total) by mouth every 6 (six) hours as needed for nausea or vomiting, Disp: 30 tablet, Rfl: 0    oxyCODONE (ROXICODONE) 5 immediate release tablet, Take 1 tablet (5 mg total) by mouth every 4 (four) hours as needed for moderate pain for up to 10 days Max Daily Amount: 30 mg, Disp: 30 tablet, Rfl: 0     patient supplied medication, medical marijuana as needed per latest dci  Indications: ., Disp: , Rfl:     senna (SENOKOT) 8.6 mg, Take 1 tablet (8.6 mg total) by mouth 2 (two) times a day 2 tabs at bedtime as needed for constipation per latest dci, Disp: , Rfl:     sertraline (ZOLOFT) 100 mg tablet, Take 100 mg by mouth every morning, Disp: , Rfl:     tamsulosin (FLOMAX) 0.4 mg, Take 1 capsule (0.4 mg total) by mouth daily with dinner, Disp: 90 capsule, Rfl: 3    Azelastine HCl 137 MCG/SPRAY SOLN, Every 12 hours (Patient not taking: Reported on 5/24/2024), Disp: , Rfl:     Cholecalciferol 125 MCG (5000 UT) capsule, every 24 hours (Patient not taking: Reported on 5/24/2024), Disp: , Rfl:     Vancomycin HCl 10 g SOLR, , Disp: , Rfl:       Active Problems     Patient Active Problem List   Diagnosis    Alcoholism in remission (HCC)    Benzodiazepine dependence (HCC)    Bilateral adhesive capsulitis of shoulders    Bronchitis, mucopurulent recurrent (HCC)    Cirrhosis, alcoholic (HCC)    Diabetic peripheral neuropathy (HCC)    DM (diabetes mellitus) (HCC)    Herniated nucleus pulposus of lumbosacral region    Hypercholesterolemia    Lumbar spondylosis    Thyroid cancer (HCC)    Liver disease    Chronic pain syndrome    Lumbar radiculopathy    Chronic bilateral low back pain without sciatica    PONV (postoperative nausea and vomiting)    Medical marijuana use    Urinary retention    Anemia    Hyponatremia    Gallstones    Status post lumbar spinal fusion    Benign prostatic hyperplasia with urinary retention    Scrotal pain    Lower urinary tract symptoms    Acute on chronic bilateral low back pain with sciatica    Anxiety and depression    Hypertension    Hypochromic microcytic anemia    Discitis of lumbosacral region    Foraminal stenosis of lumbar region    Leukocytosis    Lactic acidosis    Type 2 diabetes mellitus with hyperglycemia, without long-term current use of insulin (HCC)    Viral gastroenteritis         Past  Medical History     Past Medical History:   Diagnosis Date    Anxiety     Arthritis     Cancer (HCC)     Chronic back pain     Depression     Diabetes mellitus (HCC)     Hypertension     Liver disease     Mitral valve prolapse     Peripheral neuropathy     Neuropathy    PONV (postoperative nausea and vomiting)     Thyroid disease          Surgical History     Past Surgical History:   Procedure Laterality Date    COLONOSCOPY      INCISION AND DRAINAGE OF WOUND Left 2022    Procedure: INCISION AND DRAINAGE (I&D) EXTREMITY;  Surgeon: Moustapha Cote DPM;  Location:  MAIN OR;  Service: Podiatry    IR BIOPSY SPINE  2024    IR BIOPSY SPINE  2024    IR PICC PLACEMENT SINGLE LUMEN  2024    JOINT REPLACEMENT Left 2022    Left TSA    ME ARTHRODESIS POSTERIOR/PSTLAT TQ 1NTRSPC LUMBAR Bilateral 2023    Procedure: L1-S1 navigated posterior decompression with instrumented fixation fusion;  Surgeon: James Moraes MD;  Location:  MAIN OR;  Service: Neurosurgery    THYROID SURGERY      remove cancer         Family History     Family History   Problem Relation Age of Onset    No Known Problems Father     No Known Problems Mother     Colon cancer Neg Hx          Social History     Social History     Social History     Tobacco Use   Smoking Status Former    Current packs/day: 0.00    Average packs/day: 0.3 packs/day for 5.9 years (1.5 ttl pk-yrs)    Types: Cigarettes    Start date: 1975    Quit date: 1981    Years since quittin.0   Smokeless Tobacco Never   Tobacco Comments    quit          Pertinent Lab Values     Lab Results   Component Value Date    CREATININE 0.92 2024       Lab Results   Component Value Date    PSA 1.16 2023         Pertinent Imaging     N/A      Pertinent Pathology     N/A

## 2024-05-24 ENCOUNTER — OFFICE VISIT (OUTPATIENT)
Dept: UROLOGY | Facility: CLINIC | Age: 69
End: 2024-05-24
Payer: COMMERCIAL

## 2024-05-24 VITALS
HEART RATE: 81 BPM | HEIGHT: 69 IN | WEIGHT: 178 LBS | BODY MASS INDEX: 26.36 KG/M2 | OXYGEN SATURATION: 98 % | DIASTOLIC BLOOD PRESSURE: 64 MMHG | SYSTOLIC BLOOD PRESSURE: 156 MMHG

## 2024-05-24 DIAGNOSIS — E11.42 DIABETIC PERIPHERAL NEUROPATHY (HCC): ICD-10-CM

## 2024-05-24 DIAGNOSIS — N40.1 BENIGN PROSTATIC HYPERPLASIA WITH LOWER URINARY TRACT SYMPTOMS, SYMPTOM DETAILS UNSPECIFIED: Primary | ICD-10-CM

## 2024-05-24 LAB
BACTERIA BLD CULT: NORMAL
BACTERIA BLD CULT: NORMAL
POST-VOID RESIDUAL VOLUME, ML POC: 64 ML
SL AMB  POCT GLUCOSE, UA: NORMAL
SL AMB LEUKOCYTE ESTERASE,UA: NORMAL
SL AMB POCT BILIRUBIN,UA: NORMAL
SL AMB POCT BLOOD,UA: NORMAL
SL AMB POCT CLARITY,UA: CLEAR
SL AMB POCT COLOR,UA: YELLOW
SL AMB POCT KETONES,UA: NORMAL
SL AMB POCT NITRITE,UA: NORMAL
SL AMB POCT PH,UA: 5
SL AMB POCT SPECIFIC GRAVITY,UA: 1.1
SL AMB POCT URINE PROTEIN: NORMAL
SL AMB POCT UROBILINOGEN: NORMAL

## 2024-05-24 PROCEDURE — 51798 US URINE CAPACITY MEASURE: CPT | Performed by: UROLOGY

## 2024-05-24 PROCEDURE — 81002 URINALYSIS NONAUTO W/O SCOPE: CPT | Performed by: UROLOGY

## 2024-05-24 PROCEDURE — 99214 OFFICE O/P EST MOD 30 MIN: CPT | Performed by: UROLOGY

## 2024-05-24 RX ORDER — NIFEDIPINE 30 MG/1
TABLET, EXTENDED RELEASE ORAL
COMMUNITY
Start: 2024-02-20

## 2024-05-29 ENCOUNTER — OFFICE VISIT (OUTPATIENT)
Dept: NEUROSURGERY | Facility: CLINIC | Age: 69
End: 2024-05-29
Payer: COMMERCIAL

## 2024-05-29 VITALS
HEART RATE: 70 BPM | DIASTOLIC BLOOD PRESSURE: 78 MMHG | OXYGEN SATURATION: 98 % | RESPIRATION RATE: 17 BRPM | TEMPERATURE: 98.4 F | SYSTOLIC BLOOD PRESSURE: 158 MMHG

## 2024-05-29 DIAGNOSIS — K70.30 ALCOHOLIC CIRRHOSIS, UNSPECIFIED WHETHER ASCITES PRESENT (HCC): ICD-10-CM

## 2024-05-29 DIAGNOSIS — M54.42 ACUTE BILATERAL LOW BACK PAIN WITH BILATERAL SCIATICA: Primary | ICD-10-CM

## 2024-05-29 DIAGNOSIS — M54.41 ACUTE BILATERAL LOW BACK PAIN WITH BILATERAL SCIATICA: Primary | ICD-10-CM

## 2024-05-29 DIAGNOSIS — E11.65 TYPE 2 DIABETES MELLITUS WITH HYPERGLYCEMIA, WITHOUT LONG-TERM CURRENT USE OF INSULIN (HCC): ICD-10-CM

## 2024-05-29 DIAGNOSIS — T84.9XXD FAILURE OF JOINT FUSION, SUBSEQUENT ENCOUNTER: ICD-10-CM

## 2024-05-29 PROBLEM — T84.9XXA FAILURE OF JOINT FUSION (HCC): Status: ACTIVE | Noted: 2024-05-29

## 2024-05-29 PROCEDURE — 99215 OFFICE O/P EST HI 40 MIN: CPT | Performed by: NEUROLOGICAL SURGERY

## 2024-05-29 NOTE — ASSESSMENT & PLAN NOTE
Ongoing acute episodes of lower back pain and bilateral leg pains in the setting of more chronic pain.  Serial imaging demonstrates loosening of instrumentation at S1 and progressive degenerative changes at L5-S1.  There is also concern for an indolent infection.  I explained that he will likely always have an element of chronic pain but that some of his pain may be mechanical in nature.    We discussed reopening of lumbar incision for revision of L5-S1 instrumentation and extension of fusion to the pelvis/possible additional levels.    The goal of surgery is to relieve pressure neural structures and hopefully improve radicular pain and symptoms of neurogenic claudication. Weakness, numbness and back pain are less likely to improve. The risks of surgery were described in detail.    1. Risk of general anesthetic with possible cardiac and respiratory complication. Risk of infection and bleeding.  2. Risk of neurological injury with new pain, weakness or numbness in the legs or difficulties with bowel and bladder function. Risk of CSF leak was described.  3. Possible need for additional lumbar spine surgery in the future.    Expected postoperative course and medications were reviewed with the patient and with his wife.  It is quite possible that he will require inpatient rehab afterwards given his degree of deconditioning.  Postoperative pain management may be somewhat challenging as well and the APS service will be consulted for further evaluation and management on admission.    All his questions were answered to his satisfaction.  Written and verbal consent were personally obtained, the patient wishes to discuss further with his wife prior to scheduling surgery.  Will contact this office any questions arise.

## 2024-05-29 NOTE — ASSESSMENT & PLAN NOTE
Numerous episodes of acute on chronic lower back pain.  While he has chronic pain regardless of position, it is exacerbated by at least 35 to 40% when standing.  There may be a mechanical component to his presentation given known loosening of hardware at S1.  He has tried a number of nonsurgical pain management strategies with limited improvement in his symptoms.  There is also possible concern about indolent infection at the L5-S1 level.

## 2024-05-29 NOTE — ASSESSMENT & PLAN NOTE
Places patient at slightly higher risk of blood loss around the time of surgery.  While his initial surgery did require blood transfusion, the proposed surgery would be somewhat more limited and is less likely to require transfusion, though possible need for transfusion remains.

## 2024-05-29 NOTE — PROGRESS NOTES
Neurosurgery Office Note  Juan San 68 y.o. male MRN: [id number]      Assessment & Plan     Acute on chronic bilateral low back pain with sciatica  Numerous episodes of acute on chronic lower back pain.  While he has chronic pain regardless of position, it is exacerbated by at least 35 to 40% when standing.  There may be a mechanical component to his presentation given known loosening of hardware at S1.  He has tried a number of nonsurgical pain management strategies with limited improvement in his symptoms.  There is also possible concern about indolent infection at the L5-S1 level.    Failure of joint fusion (HCC)  Ongoing acute episodes of lower back pain and bilateral leg pains in the setting of more chronic pain.  Serial imaging demonstrates loosening of instrumentation at S1 and progressive degenerative changes at L5-S1.  There is also concern for an indolent infection.  I explained that he will likely always have an element of chronic pain but that some of his pain may be mechanical in nature.    We discussed reopening of lumbar incision for revision of L5-S1 instrumentation and extension of fusion to the pelvis/possible additional levels.    The goal of surgery is to relieve pressure neural structures and hopefully improve radicular pain and symptoms of neurogenic claudication. Weakness, numbness and back pain are less likely to improve. The risks of surgery were described in detail.    1. Risk of general anesthetic with possible cardiac and respiratory complication. Risk of infection and bleeding.  2. Risk of neurological injury with new pain, weakness or numbness in the legs or difficulties with bowel and bladder function. Risk of CSF leak was described.  3. Possible need for additional lumbar spine surgery in the future.    Expected postoperative course and medications were reviewed with the patient and with his wife.  It is quite possible that he will require inpatient rehab afterwards given his  degree of deconditioning.  Postoperative pain management may be somewhat challenging as well and the APS service will be consulted for further evaluation and management on admission.    All his questions were answered to his satisfaction.  Written and verbal consent were personally obtained, the patient wishes to discuss further with his wife prior to scheduling surgery.  Will contact this office any questions arise.    Type 2 diabetes mellitus with hyperglycemia, without long-term current use of insulin (HCC)  Places patient at high risk of wound healing issues and infection.  Ideally hemoglobin A1c will be less than 7.5 around the time of surgery.  Lab Results   Component Value Date    HGBA1C 7.9 (H) 05/19/2024       Cirrhosis, alcoholic (HCC)  Places patient at slightly higher risk of blood loss around the time of surgery.  While his initial surgery did require blood transfusion, the proposed surgery would be somewhat more limited and is less likely to require transfusion, though possible need for transfusion remains.    I have spent a total time of 40 minutes on 05/29/24 in caring for this patient including Diagnostic results, Prognosis, Risks and benefits of tx options, Instructions for management, Patient and family education, Risk factor reductions, Impressions, and Counseling / Coordination of care.    Subjective/Objective     Chief Complaint    Lower back and bilateral leg pain.    HPI    68-year-old gentleman made by his wife today.  He underwent an extensive lumbar decompression and fusion in April 2023 for chronic lower back and bilateral leg pain.  Unfortunately his symptoms did not improve and approximately every 6 weeks or so he gets an exacerbation of his more chronic pain.  He describes severe pain when lying down or sitting down in his lower back and into his legs which becomes 35 to 40% worse with standing.  He indicates that his bowel and bladder function have actually improved since surgery.   His current pain medications are listed below.    His symptoms have resulted in numerous admissions through the emergency department.  His pain is generally controlled to some extent with in-hospital pain medication, but are not sufficiently controlled per the patient and his wife on an outpatient basis.  He denies any fever, chills or sweats but has been noted to have a leukocytosis on numerous admissions.  He has had serial imaging concerning for loosening of hardware at L5-S1 and possible osteomyelitis discitis the blood cultures have been persistently negative.  On past visits we have discussed revision surgery but at that time he was not interested.  He presents today for further surgical discussion.        EDITH SHARMA personally reviewed and updated.    Review of Systems   Respiratory:          Daily user medical marijuana, takes for pain   Musculoskeletal:  Positive for back pain (radiates to hip/waist/BLE) and gait problem (in wheelchair, no falls w/in last week). Negative for myalgias.   Neurological:  Positive for weakness (BLE) and numbness (Hx diabetes/diabetic neuropathy, N/T can also accompany back/waist pain).   Psychiatric/Behavioral:  Positive for sleep disturbance (due to pain). The patient is not nervous/anxious (sadness due to pain).    All other systems reviewed and are negative.      Family History    Family History   Problem Relation Age of Onset    No Known Problems Father     No Known Problems Mother     Colon cancer Neg Hx        Social History    Social History     Socioeconomic History    Marital status: /Civil Union     Spouse name: Not on file    Number of children: Not on file    Years of education: Not on file    Highest education level: Not on file   Occupational History    Not on file   Tobacco Use    Smoking status: Some Days     Current packs/day: 0.00     Average packs/day: 0.3 packs/day for 5.9 years (1.5 ttl pk-yrs)     Types: Cigarettes     Start date: 7/14/1975     Last  attempt to quit: 1981     Years since quittin.0    Smokeless tobacco: Never    Tobacco comments:     quit ; smokes when in pain as of 26   Vaping Use    Vaping status: Some Days    Substances: THC, CBD   Substance and Sexual Activity    Alcohol use: Yes    Drug use: Yes     Frequency: 7.0 times per week     Types: Marijuana     Comment: Medical Marijuana, takes for pain, daily, vape    Sexual activity: Yes     Partners: Female     Birth control/protection: None   Other Topics Concern    Not on file   Social History Narrative    Not on file     Social Determinants of Health     Financial Resource Strain: Not on file   Food Insecurity: No Food Insecurity (2024)    Hunger Vital Sign     Worried About Running Out of Food in the Last Year: Never true     Ran Out of Food in the Last Year: Never true   Transportation Needs: No Transportation Needs (2024)    PRAPARE - Transportation     Lack of Transportation (Medical): No     Lack of Transportation (Non-Medical): No   Physical Activity: Not on file   Stress: Not on file   Social Connections: Not on file   Intimate Partner Violence: Not on file   Housing Stability: Low Risk  (2024)    Housing Stability Vital Sign     Unable to Pay for Housing in the Last Year: No     Number of Times Moved in the Last Year: 1     Homeless in the Last Year: No       Past Medical History    Past Medical History:   Diagnosis Date    Anxiety     Arthritis     Cancer (HCC)     Chronic back pain     Depression     Diabetes mellitus (HCC)     Hypertension     Liver disease     Mitral valve prolapse     Peripheral neuropathy     Neuropathy    PONV (postoperative nausea and vomiting)     Thyroid disease        Surgical History    Past Surgical History:   Procedure Laterality Date    COLONOSCOPY      INCISION AND DRAINAGE OF WOUND Left 2022    Procedure: INCISION AND DRAINAGE (I&D) EXTREMITY;  Surgeon: Moustapha Cote DPM;  Location:  MAIN OR;  Service:  Podiatry    IR BIOPSY SPINE  1/30/2024    IR BIOPSY SPINE  2/1/2024    IR PICC PLACEMENT SINGLE LUMEN  2/6/2024    JOINT REPLACEMENT Left 03/11/2022    Left TSA    AL ARTHRODESIS POSTERIOR/PSTLAT TQ 1NTRSPC LUMBAR Bilateral 04/11/2023    Procedure: L1-S1 navigated posterior decompression with instrumented fixation fusion;  Surgeon: James Moraes MD;  Location:  MAIN OR;  Service: Neurosurgery    THYROID SURGERY  2021    remove cancer       Medications      Current Outpatient Medications:     ACCU-CHEK FABIANO PLUS test strip, 4 (four) times a day, Disp: , Rfl: 1    ACCU-CHEK FASTCLIX LANCETS MISC, 4 (four) times a day, Disp: , Rfl: 1    acetaminophen (TYLENOL) 500 mg tablet, Take 2 tablets (1,000 mg total) by mouth every 8 (eight) hours 500 mg tablet, Disp: , Rfl:     ceFEPime (MAXIPIME) 2 g injection, , Disp: , Rfl:     celecoxib (CeleBREX) 100 mg capsule, Take 1 capsule (100 mg total) by mouth 2 (two) times a day Take with food, Disp: 60 capsule, Rfl: 3    clonazePAM (KlonoPIN) 1 mg tablet, Take 1 mg by mouth 2 (two) times a day & 3rd pill PRN, Disp: , Rfl:     finasteride (PROSCAR) 5 mg tablet, Take 1 tablet (5 mg total) by mouth daily, Disp: 90 tablet, Rfl: 3    folic acid (FOLVITE) 1 mg tablet, Take 2,000 mcg by mouth daily, Disp: , Rfl: 6    gabapentin (NEURONTIN) 400 mg capsule, Take 1 capsule (400 mg total) by mouth 2 (two) times a day, Disp: 60 capsule, Rfl: 5    GNP Aspirin Low Dose 81 MG EC tablet, Take 1 tablet (81 mg total) by mouth daily Do not start before April 25, 2023., Disp: , Rfl: 0    hydrOXYzine pamoate (VISTARIL) 50 mg capsule, Take 50 mg by mouth 2 (two) times a day. Indications: Feeling Anxious, Disp: , Rfl:     Jardiance 10 MG TABS, Take 10 mg by mouth every morning, Disp: , Rfl:     ketoconazole (NIZORAL) 2 % shampoo, Apply 1 application. topically daily Use as directed, Disp: , Rfl: 6    lidocaine (LIDODERM) 5 %, Apply 1 patch topically over 12 hours daily Remove & Discard patch within 12  hours or as directed by MD, Disp: 15 patch, Rfl: 0    loratadine (CLARITIN) 10 mg tablet, Take 10 mg by mouth daily. [facility]  Indications: Hayfever, Disp: , Rfl:     losartan-hydrochlorothiazide (HYZAAR) 100-25 MG per tablet, Take 1 tablet by mouth every morning, Disp: , Rfl: 0    lovastatin (MEVACOR) 20 mg tablet, Take 20 mg by mouth every morning, Disp: , Rfl: 3    metFORMIN (GLUCOPHAGE) 1000 MG tablet, Take 1,000 mg by mouth 2 (two) times a day with meals , Disp: , Rfl:     methocarbamol (ROBAXIN) 750 mg tablet, Take 1 tablet (750 mg total) by mouth every 6 (six) hours as needed for muscle spasms, Disp: , Rfl:     metoprolol succinate (TOPROL-XL) 50 mg 24 hr tablet, TAKE 1 AND 1/2 TABLET BY MOUTH ONCE DAILY, Disp: , Rfl:     mirtazapine (REMERON) 45 MG tablet, Take 45 mg by mouth daily at bedtime, Disp: , Rfl: 1    NIFEdipine (PROCARDIA XL) 30 mg 24 hr tablet, TAKE 1 TABLET BY MOUTH EVERY DAY IN ADDITION TO 90MG FOR A TOTAL OF 120MG EVERY DAY, Disp: , Rfl:     NIFEdipine (PROCARDIA XL) 90 mg 24 hr tablet, Take 120 mg by mouth every morning Takes 90 mg and 30 mg =120 mg every AM, Disp: , Rfl: 3    ondansetron (Zofran ODT) 4 mg disintegrating tablet, Take 1 tablet (4 mg total) by mouth every 6 (six) hours as needed for nausea or vomiting, Disp: 30 tablet, Rfl: 0    oxyCODONE (ROXICODONE) 5 immediate release tablet, Take 1 tablet (5 mg total) by mouth every 4 (four) hours as needed for moderate pain for up to 10 days Max Daily Amount: 30 mg, Disp: 30 tablet, Rfl: 0    patient supplied medication, medical marijuana as needed per latest dci  Indications: ., Disp: , Rfl:     senna (SENOKOT) 8.6 mg, Take 1 tablet (8.6 mg total) by mouth 2 (two) times a day 2 tabs at bedtime as needed for constipation per latest dci, Disp: , Rfl:     sertraline (ZOLOFT) 100 mg tablet, Take 100 mg by mouth every morning, Disp: , Rfl:     tamsulosin (FLOMAX) 0.4 mg, Take 1 capsule (0.4 mg total) by mouth daily with  dinner, Disp: 90 capsule, Rfl: 3    Azelastine HCl 137 MCG/SPRAY SOLN, Every 12 hours (Patient not taking: Reported on 5/29/2024), Disp: , Rfl:     Cholecalciferol 125 MCG (5000 UT) capsule, every 24 hours (Patient not taking: Reported on 5/24/2024), Disp: , Rfl:     fluticasone (FLONASE) 50 mcg/act nasal spray, 1 spray into each nostril daily as needed (Patient not taking: Reported on 5/29/2024), Disp: , Rfl:     Vancomycin HCl 10 g SOLR, , Disp: , Rfl:     Allergies    Allergies   Allergen Reactions    Abilify [Aripiprazole] Tremor     Shaking      Cephalexin Diarrhea    Molds & Smuts Allergic Rhinitis    Pregabalin Tremor     Lyrica - shaking feeling       The following portions of the patient's history were reviewed and updated as appropriate: allergies, current medications, past family history, past medical history, past social history, past surgical history, and problem list.    Investigations    I personally reviewed the CT, MRI, and XRAY results with the patient:    Upright plain film lumbar spine dated May 20, 2024. There is bilateral pedicle screw and stabilizing dorota fixation at the T12-S1 levels .  Stable lucency around the S1 pedicle screws  Stable hardware alignment. Grade 1 retrolisthesis at L1-L2, similar to prior. No evidence of dynamic instability. Stable endplate irregularity and subchondral sclerosis at L5-S1. Poor definition of the endplates at L1-L2.    MRI of the lumbar spine with and without contrast dated May 19, 2024. Stable postoperative changes status post fusion from T12-S1. Stable loosening of the S1 pedicle screws with surrounding marrow edema that may be due to infection but could also be due to reactive edema. Sequela of discitis/osteomyelitis again seen from L1-2 to L5-S1. Residual enhancing ventral epidural soft tissue at L5-S1 may represent granulation tissue and/or phlegmon. Similar prevertebral enhancement at L5 and S1. No abscess. No compression of the conus or cauda  equina.    CT scan of the lumbar spine without contrast dated May 18, 2024. Stable posterior fusion hardware T12-S1 with decompressive laminectomies at the L1 through the L5 levels. Persistent lucency surrounding the bilateral S1 screws suggestive of loosening. Prior discitis/osteomyelitis at the L5-S1 level with interval increased endplate irregularity and lucency along the endplates compared to 1/24/2024 suggestive of worsening disease. There is again suggestion of moderate ventral epidural phlegmonous changes with narrowing of the thecal sac. Follow-up MRI of the lumbar spine with contrast is recommended. Persistent endplate irregularity at the L1-2, L2-3, and L4-5 levels likely related to known subacute discitis/osteomyelitis.    Physical Exam    Vitals:  Blood pressure 158/78, pulse 70, temperature 98.4 °F (36.9 °C), temperature source Temporal, resp. rate 17, SpO2 98%.,There is no height or weight on file to calculate BMI.    Physical Exam  Vitals reviewed.   Constitutional:       General: He is not in acute distress.     Appearance: He is ill-appearing.   Eyes:      Extraocular Movements: Extraocular movements intact.   Pulmonary:      Effort: Pulmonary effort is normal. No respiratory distress.   Musculoskeletal:         General: No deformity.      Comments: Tender to palpation over inferior aspect of lumbar incision.   Skin:     General: Skin is warm and dry.      Comments: Lumbar incision is well-healed.   Neurological:      Mental Status: He is alert and oriented to person, place, and time.      Sensory: Sensory deficit present.      Motor: Weakness present.      Gait: Gait abnormal.      Comments: 4/5 power in lower extremities diffusely, somewhat antalgic in nature.  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  Stands from seated position slowly.  Unable to take steps but is able to hold own body weight.   Psychiatric:         Mood and Affect: Mood normal.         Behavior:  Behavior normal.     Neurologic Exam     Mental Status   Oriented to person, place, and time.

## 2024-05-29 NOTE — ASSESSMENT & PLAN NOTE
Places patient at high risk of wound healing issues and infection.  Ideally hemoglobin A1c will be less than 7.5 around the time of surgery.  Lab Results   Component Value Date    HGBA1C 7.9 (H) 05/19/2024

## 2024-05-30 NOTE — TELEPHONE ENCOUNTER
Patient is being referred to a orthopedics. Please schedule accordingly.     [facility]   [phone]

## 2024-06-07 DIAGNOSIS — M54.16 LUMBAR RADICULOPATHY, ACUTE: ICD-10-CM

## 2024-06-07 DIAGNOSIS — T84.9XXD FAILURE OF JOINT FUSION, SUBSEQUENT ENCOUNTER: Primary | ICD-10-CM

## 2024-06-07 RX ORDER — GABAPENTIN 300 MG/1
300 CAPSULE ORAL ONCE
OUTPATIENT
Start: 2024-06-07 | End: 2024-06-07

## 2024-06-07 RX ORDER — CHLORHEXIDINE GLUCONATE ORAL RINSE 1.2 MG/ML
15 SOLUTION DENTAL ONCE
OUTPATIENT
Start: 2024-06-07 | End: 2024-06-07

## 2024-06-07 RX ORDER — ACETAMINOPHEN 325 MG/1
975 TABLET ORAL ONCE
OUTPATIENT
Start: 2024-06-07 | End: 2024-06-07

## 2024-06-13 ENCOUNTER — TELEPHONE (OUTPATIENT)
Age: 69
End: 2024-06-13

## 2024-06-18 PROBLEM — A08.4 VIRAL GASTROENTERITIS: Status: RESOLVED | Noted: 2024-05-19 | Resolved: 2024-06-18

## 2024-06-19 ENCOUNTER — TELEPHONE (OUTPATIENT)
Dept: NEUROSURGERY | Facility: CLINIC | Age: 69
End: 2024-06-19

## 2024-06-24 NOTE — PROGRESS NOTES
[address] [facility]  Progress Note  Name: Ibis Shi  MRN: [id number]  Unit/Bed#: -01 I Date of Admission: 10/9/2023   Date of Service: 10/10/2023 I Hospital Day: 0    Assessment/Plan   * Fall  Assessment & Plan  Per patient's wife, patient fell 3 times yesterday  Wife reported patient has ambulatory dysfunction since the back surgery a few months ago. She reported because of the pain his legs are giving up also he rested in bed most of the time since the back surgery. Suspect this is multifactorial: Back pain, deconditioning, and medication side effect  Patient's wife reported patient is drowsy after gabapentin. Decrease gabapentin to 300 mg to HS only, discontinue hydroxyzine. fall precaution. PT/OT- recommended level 3   Continue pain management    Hypochromic microcytic anemia  Assessment & Plan  Recent Labs     10/09/23  1039 10/10/23  0352   HGB 8.6* 8.4*      Anemic since the back surgery back in April   Said he had colonoscopy in the past that were normal   Check iron panel     Hypoxia  Assessment & Plan  Patient was noted to have oxygen saturation in the 70s while he was sleeping. Saturating fine at room air . Also patient was snoring. Concerning for MANUEL. Oxygen on nasal cannula  Will need sleep study as outpatient    Hypertension  Assessment & Plan  Continue HCTZ losartan    Anxiety and depression  Assessment & Plan  Home medications Zoloft 100 mg, Remeron 45 mg. Wife mentioned patient was tried on a lower dose of Remeron however he did not do well with it and Remeron was increased back to 45 mg daily. Also taking hydroxyzine at bedtime  On clonazepam 1 mg twice a day and 3rd pill as needed  Patient was noted to have pinpoint pupils yesterday. Reviewed patient's medication list.  Not on opiates but on benzodiazepine. Urine drug test - positive for THC only.  CTH w/o acute changes   Klonopin was decreased to 1 mg daily, add 0.5 mg bid prn today   Pupils still slightly smaller but more reactive today        Chronic bilateral low back pain with sciatica  Assessment & Plan  Patient's status post T12-S1 fusion with bilateral laminectomy and facetectomy in April 2023. Patient reported continuous of back pain since, radiating bilateral legs. Per wife and patient, the pain was disabling, patient mostly resting in bed also with decreased oral intake. Was seen as outpatient by acute pain management and was started on gabapentin that was slowly titrated to 400 mg 4 times a day. Per wife, patient somnolent after gabapentin. Was on steroids before, not currently  Also on medical marijuana  Not on opiates  Because of fall, patient had CT lumbar that showed development of lucency S1 around bilateral screws discerning for loosening. No bone destruction but correlation with infection, clinically   neurosurgery was consulted, recommended upright x-ray lumbar spine. Per neurosurgery, there is a hardware loosening, but there is no movement noted on the XR with bending and there is no spinal instability. No surgery indicated   Patient has milld leukocytosis that might be reactive, has normal procalcitonin. No concern of infection at this point. Pain management  PT/OT     Urinary retention  Assessment & Plan  History of urine retention and BPH. Patient he started to retain urine after his back surgery in April  Was seen in urology office as outpatient, had cystoscopy that showed prostate enlarged (140 g). Urologist discussed with him about surgery however patient declined at that time   Patient currently self caths 3 times a day  Had urinary infection in August treated for E.  Coli  Urinary retention protocol  Continue self cath     Hypercholesterolemia  Assessment & Plan  Continue statin    DM (diabetes mellitus) [facility])  Assessment & Plan  Lab Results   Component Value Date    HGBA1C 6.3 (H) 03/14/2023       Recent Labs     10/09/23  2056 10/10/23  0733 10/10/23  1106 10/10/23  1616 POCGLU 136 158* 227* 137       Blood Sugar Average: Last 72 hrs:  (P) 164.5On Jardiance as outpatient. Also because of poor oral intake patient was not taking his 30 units of Lantus in the morning, since    Hold oral medication. Continue ssi only   Accu-Chek  Diabetic diet      Diabetic peripheral neuropathy [facility])  Assessment & Plan  Lab Results   Component Value Date    HGBA1C 6.3 (H) 2023       Recent Labs     10/09/23  2056 10/10/23  0733 10/10/23  1106 10/10/23  1616   POCGLU 136 158* 227* 137       Blood Sugar Average: Last 72 hrs:  (P) 164.5Chronic, currently on gabapentin. Had side effects on Lyrica  Will start gabapentin at bedtime only                  VTE Pharmacologic Prophylaxis: VTE Score: 4 Moderate Risk (Score 3-4) - Pharmacological DVT Prophylaxis Ordered: enoxaparin (Lovenox). Patient Centered Rounds: I performed bedside rounds with nursing staff today. Discussions with Specialists or Other Care Team Provider: cm, pt, ot     Education and Discussions with Family / Patient: Updated  (wife) at bedside. Total Time Spent on Date of Encounter in care of patient: 30 mins. This time was spent on one or more of the following: performing physical exam; counseling and coordination of care; obtaining or reviewing history; documenting in the medical record; reviewing/ordering tests, medications or procedures; communicating with other healthcare professionals and discussing with patient's family/caregivers. Current Length of Stay: 0 day(s)  Current Patient Status: Inpatient   Certification Statement: The patient will continue to require additional inpatient hospital stay due to placement   Discharge Plan: Anticipate discharge in 24-48 hrs to rehab facility. Code Status: Level 3 - DNAR and DNI    Subjective:   Patient said he didn't sleep last night because of pain. Improved with opiates.  No other complaints     Objective:     Vitals:   Temp (24hrs), Av.2 °F (36.2 °C), Min:97 °F (36.1 °C), Max:97.5 °F (36.4 °C)    Temp:  [97 °F (36.1 °C)-97.5 °F (36.4 °C)] 97.5 °F (36.4 °C)  HR:  [69-78] 69  Resp:  [16-20] 18  BP: (150-161)/(72-86) 151/77  SpO2:  [97 %-99 %] 97 %  Body mass index is 22.98 kg/m². Input and Output Summary (last 24 hours): Intake/Output Summary (Last 24 hours) at 10/10/2023 1648  Last data filed at 10/10/2023 0358  Gross per 24 hour   Intake --   Output 1600 ml   Net -1600 ml       Physical Exam:   Physical Exam  Vitals and nursing note reviewed. Constitutional:       General: He is not in acute distress. Appearance: He is not diaphoretic. HENT:      Head: Normocephalic. Eyes:      General: No scleral icterus. Right eye: No discharge. Left eye: No discharge. Cardiovascular:      Rate and Rhythm: Normal rate and regular rhythm. Pulmonary:      Effort: Pulmonary effort is normal. No respiratory distress. Breath sounds: Normal breath sounds. No wheezing, rhonchi or rales. Abdominal:      General: There is no distension. Palpations: Abdomen is soft. Tenderness: There is no abdominal tenderness. There is no guarding or rebound. Musculoskeletal:      Cervical back: Normal range of motion. Right lower leg: No edema. Left lower leg: No edema. Skin:     General: Skin is warm. Coloration: Skin is pale. Neurological:      Mental Status: He is alert and oriented to person, place, and time. Psychiatric:         Mood and Affect: Mood normal.         Behavior: Behavior normal.         Thought Content:  Thought content normal.         Judgment: Judgment normal.          Additional Data:     Labs:  Results from last 7 days   Lab Units 10/10/23  0352 10/09/23  1039   WBC Thousand/uL 9.81 12.33*   HEMOGLOBIN g/dL 8.4* 8.6*   HEMATOCRIT % 29.3* 31.4*   PLATELETS Thousands/uL 482* 501*   NEUTROS PCT %  --  65   LYMPHS PCT %  --  23   MONOS PCT %  --  7   EOS PCT %  --  3     Results from last 7 days   Lab Units 10/10/23  0352 10/09/23  1039   SODIUM mmol/L 135 137   POTASSIUM mmol/L 3.8 4.3   CHLORIDE mmol/L 100 100   CO2 mmol/L 28 30   BUN mg/dL 16 20   CREATININE mg/dL 0.76 0.95   ANION GAP mmol/L 7 7   CALCIUM mg/dL 9.1 9.2   ALBUMIN g/dL  --  4.4   TOTAL BILIRUBIN mg/dL  --  0.32   ALK PHOS U/L  --  75   ALT U/L  --  12   AST U/L  --  21   GLUCOSE RANDOM mg/dL 106 174*     Results from last 7 days   Lab Units 10/09/23  1208   INR  0.96     Results from last 7 days   Lab Units 10/10/23  1616 10/10/23  1106 10/10/23  0733 10/09/23  2056   POC GLUCOSE mg/dl 137 227* 158* 136         Results from last 7 days   Lab Units 10/09/23  1208   LACTIC ACID mmol/L 2.0   PROCALCITONIN ng/ml 0.09       Lines/Drains:  Invasive Devices       Peripheral Intravenous Line  Duration             Peripheral IV 10/09/23 Left Antecubital 1 day                          Imaging: Reviewed radiology reports from this admission including: xray(s)    Recent Cultures (last 7 days):   Results from last 7 days   Lab Units 10/09/23  1214 10/09/23  1208   BLOOD CULTURE  No Growth at 24 hrs. No Growth at 24 hrs.        Last 24 Hours Medication List:   Current Facility-Administered Medications   Medication Dose Route Frequency Provider Last Rate    acetaminophen  975 mg Oral Q8H Sylwia Barrera MD      aspirin  81 mg Oral Daily Sylwia Barrera MD      clonazePAM  0.5 mg Oral BID PRN Sylwia Barrera MD      clonazePAM  1 mg Oral Daily Sylwia Barrera MD      enoxaparin  40 mg Subcutaneous Daily Sylwia Barrera MD      finasteride  5 mg Oral Daily Sylwia Barrera MD      fluticasone  1 spray Nasal BID PRN Sylwia Barrera MD      folic acid  7,210 mcg Oral Daily Sylwia Barrera MD      gabapentin  300 mg Oral HS Sylwia Barrera MD      losartan  100 mg Oral QAM Sylwia Barrera MD      And    hydrochlorothiazide  25 mg Oral QAM Sylwia Barrera MD      HYDROmorphone  0.2 mg Intravenous Q4H PRN Lorne Snellen Michelle Neumann MD      insulin lispro  1-5 Units Subcutaneous HS Lindsey Bloodtricia, MD      insulin lispro  1-6 Units Subcutaneous TID AC Lindsey Hansen, MD      methocarbamol  750 mg Oral Q6H PRN Lindsey Hansen, MD      metoprolol succinate  75 mg Oral QAM Lindsey Hansen, MD      mirtazapine  45 mg Oral HS Lindsey Hansen, MD      NIFEdipine  120 mg Oral QAM Lindsey Hansen, MD      oxyCODONE  2.5 mg Oral Q4H PRN Lindsey Hansen, MD      polyethylene glycol  17 g Oral Daily PRN Lindsey Hansen, MD      pravastatin  20 mg Oral Daily With Karla Ge MD      senna-docusate sodium  1 tablet Oral HS Lindsey Hansen, MD      sertraline  100 mg Oral QAM Lindsey Hansen MD      tamsulosin  0.4 mg Oral Daily With Karla Ge MD          Today, Patient Was Seen By: Lindsey Hansen MD    **Please Note: This note may have been constructed using a voice recognition system. ** [facility]

## 2024-06-27 ENCOUNTER — APPOINTMENT (EMERGENCY)
Dept: MRI IMAGING | Facility: HOSPITAL | Age: 69
End: 2024-06-27
Payer: COMMERCIAL

## 2024-06-27 ENCOUNTER — APPOINTMENT (EMERGENCY)
Dept: CT IMAGING | Facility: HOSPITAL | Age: 69
End: 2024-06-27
Payer: COMMERCIAL

## 2024-06-27 ENCOUNTER — APPOINTMENT (EMERGENCY)
Dept: RADIOLOGY | Facility: HOSPITAL | Age: 69
End: 2024-06-27
Payer: COMMERCIAL

## 2024-06-27 ENCOUNTER — HOSPITAL ENCOUNTER (OUTPATIENT)
Facility: HOSPITAL | Age: 69
Setting detail: OBSERVATION
Discharge: ANOTHER HOSPITAL | End: 2024-06-28
Attending: EMERGENCY MEDICINE | Admitting: INTERNAL MEDICINE
Payer: COMMERCIAL

## 2024-06-27 DIAGNOSIS — M46.47 DISCITIS OF LUMBOSACRAL REGION: ICD-10-CM

## 2024-06-27 DIAGNOSIS — T84.9XXD FAILURE OF JOINT FUSION, SUBSEQUENT ENCOUNTER: Primary | ICD-10-CM

## 2024-06-27 LAB
ALBUMIN SERPL BCG-MCNC: 4.5 G/DL (ref 3.5–5)
ALP SERPL-CCNC: 79 U/L (ref 34–104)
ALT SERPL W P-5'-P-CCNC: 10 U/L (ref 7–52)
ANION GAP SERPL CALCULATED.3IONS-SCNC: 13 MMOL/L (ref 4–13)
APTT PPP: 30 SECONDS (ref 23–37)
AST SERPL W P-5'-P-CCNC: 16 U/L (ref 13–39)
BACTERIA UR QL AUTO: NORMAL /HPF
BASOPHILS # BLD MANUAL: 0 THOUSAND/UL (ref 0–0.1)
BASOPHILS NFR MAR MANUAL: 0 % (ref 0–1)
BILIRUB SERPL-MCNC: 0.39 MG/DL (ref 0.2–1)
BILIRUB UR QL STRIP: NEGATIVE
BUN SERPL-MCNC: 13 MG/DL (ref 5–25)
CALCIUM SERPL-MCNC: 10 MG/DL (ref 8.4–10.2)
CHLORIDE SERPL-SCNC: 91 MMOL/L (ref 96–108)
CLARITY UR: CLEAR
CO2 SERPL-SCNC: 29 MMOL/L (ref 21–32)
COLOR UR: ABNORMAL
CREAT SERPL-MCNC: 0.83 MG/DL (ref 0.6–1.3)
DACRYOCYTES BLD QL SMEAR: PRESENT
EOSINOPHIL # BLD MANUAL: 0 THOUSAND/UL (ref 0–0.4)
EOSINOPHIL NFR BLD MANUAL: 0 % (ref 0–6)
ERYTHROCYTE [DISTWIDTH] IN BLOOD BY AUTOMATED COUNT: 18.1 % (ref 11.6–15.1)
GFR SERPL CREATININE-BSD FRML MDRD: 91 ML/MIN/1.73SQ M
GLUCOSE SERPL-MCNC: 234 MG/DL (ref 65–140)
GLUCOSE UR STRIP-MCNC: ABNORMAL MG/DL
HCT VFR BLD AUTO: 39.9 % (ref 36.5–49.3)
HGB BLD-MCNC: 12.1 G/DL (ref 12–17)
HGB UR QL STRIP.AUTO: NEGATIVE
HYPERCHROMIA BLD QL SMEAR: PRESENT
INR PPP: 0.97 (ref 0.84–1.19)
KETONES UR STRIP-MCNC: ABNORMAL MG/DL
LACTATE SERPL-SCNC: 2.5 MMOL/L (ref 0.5–2)
LEUKOCYTE ESTERASE UR QL STRIP: NEGATIVE
LYMPHOCYTES # BLD AUTO: 0.92 THOUSAND/UL (ref 0.6–4.47)
LYMPHOCYTES # BLD AUTO: 8 % (ref 14–44)
MCH RBC QN AUTO: 24.1 PG (ref 26.8–34.3)
MCHC RBC AUTO-ENTMCNC: 30.3 G/DL (ref 31.4–37.4)
MCV RBC AUTO: 80 FL (ref 82–98)
MICROCYTES BLD QL AUTO: PRESENT
MONOCYTES # BLD AUTO: 0.34 THOUSAND/UL (ref 0–1.22)
MONOCYTES NFR BLD: 3 % (ref 4–12)
NEUTROPHILS # BLD MANUAL: 10.19 THOUSAND/UL (ref 1.85–7.62)
NEUTS SEG NFR BLD AUTO: 89 % (ref 43–75)
NITRITE UR QL STRIP: NEGATIVE
NON-SQ EPI CELLS URNS QL MICRO: NORMAL /HPF
OVALOCYTES BLD QL SMEAR: PRESENT
PH UR STRIP.AUTO: 8 [PH]
PLATELET # BLD AUTO: 375 THOUSANDS/UL (ref 149–390)
PLATELET BLD QL SMEAR: ADEQUATE
PMV BLD AUTO: 10.2 FL (ref 8.9–12.7)
POIKILOCYTOSIS BLD QL SMEAR: PRESENT
POTASSIUM SERPL-SCNC: 3.9 MMOL/L (ref 3.5–5.3)
PROCALCITONIN SERPL-MCNC: 0.09 NG/ML
PROT SERPL-MCNC: 8 G/DL (ref 6.4–8.4)
PROT UR STRIP-MCNC: ABNORMAL MG/DL
PROTHROMBIN TIME: 13.2 SECONDS (ref 11.6–14.5)
RBC # BLD AUTO: 5.02 MILLION/UL (ref 3.88–5.62)
RBC #/AREA URNS AUTO: NORMAL /HPF
RBC MORPH BLD: PRESENT
SODIUM SERPL-SCNC: 133 MMOL/L (ref 135–147)
SP GR UR STRIP.AUTO: 1.01 (ref 1–1.03)
UROBILINOGEN UR STRIP-ACNC: <2 MG/DL
VANCOMYCIN SERPL-MCNC: 10.8 UG/ML (ref 10–20)
WBC # BLD AUTO: 11.45 THOUSAND/UL (ref 4.31–10.16)
WBC #/AREA URNS AUTO: NORMAL /HPF

## 2024-06-27 PROCEDURE — 85730 THROMBOPLASTIN TIME PARTIAL: CPT

## 2024-06-27 PROCEDURE — 96361 HYDRATE IV INFUSION ADD-ON: CPT

## 2024-06-27 PROCEDURE — 96375 TX/PRO/DX INJ NEW DRUG ADDON: CPT

## 2024-06-27 PROCEDURE — 96365 THER/PROPH/DIAG IV INF INIT: CPT

## 2024-06-27 PROCEDURE — 80202 ASSAY OF VANCOMYCIN: CPT

## 2024-06-27 PROCEDURE — A9585 GADOBUTROL INJECTION: HCPCS | Performed by: ORTHOPAEDIC SURGERY

## 2024-06-27 PROCEDURE — 85007 BL SMEAR W/DIFF WBC COUNT: CPT

## 2024-06-27 PROCEDURE — 74177 CT ABD & PELVIS W/CONTRAST: CPT

## 2024-06-27 PROCEDURE — 85610 PROTHROMBIN TIME: CPT

## 2024-06-27 PROCEDURE — 71046 X-RAY EXAM CHEST 2 VIEWS: CPT

## 2024-06-27 PROCEDURE — 99285 EMERGENCY DEPT VISIT HI MDM: CPT

## 2024-06-27 PROCEDURE — 36415 COLL VENOUS BLD VENIPUNCTURE: CPT

## 2024-06-27 PROCEDURE — 72158 MRI LUMBAR SPINE W/O & W/DYE: CPT

## 2024-06-27 PROCEDURE — 99284 EMERGENCY DEPT VISIT MOD MDM: CPT

## 2024-06-27 PROCEDURE — 84145 PROCALCITONIN (PCT): CPT

## 2024-06-27 PROCEDURE — 80053 COMPREHEN METABOLIC PANEL: CPT

## 2024-06-27 PROCEDURE — 85027 COMPLETE CBC AUTOMATED: CPT

## 2024-06-27 PROCEDURE — 83605 ASSAY OF LACTIC ACID: CPT

## 2024-06-27 PROCEDURE — 81001 URINALYSIS AUTO W/SCOPE: CPT

## 2024-06-27 PROCEDURE — 87040 BLOOD CULTURE FOR BACTERIA: CPT

## 2024-06-27 RX ORDER — MORPHINE SULFATE 10 MG/ML
8 INJECTION, SOLUTION INTRAMUSCULAR; INTRAVENOUS ONCE
Status: COMPLETED | OUTPATIENT
Start: 2024-06-27 | End: 2024-06-27

## 2024-06-27 RX ORDER — VANCOMYCIN HYDROCHLORIDE 1 G/200ML
1000 INJECTION, SOLUTION INTRAVENOUS ONCE
Status: COMPLETED | OUTPATIENT
Start: 2024-06-27 | End: 2024-06-28

## 2024-06-27 RX ORDER — GADOBUTROL 604.72 MG/ML
8 INJECTION INTRAVENOUS
Status: COMPLETED | OUTPATIENT
Start: 2024-06-27 | End: 2024-06-27

## 2024-06-27 RX ORDER — HYDROMORPHONE HCL/PF 1 MG/ML
0.5 SYRINGE (ML) INJECTION ONCE
Status: COMPLETED | OUTPATIENT
Start: 2024-06-27 | End: 2024-06-27

## 2024-06-27 RX ORDER — CEFEPIME HYDROCHLORIDE 2 G/50ML
2000 INJECTION, SOLUTION INTRAVENOUS ONCE
Status: COMPLETED | OUTPATIENT
Start: 2024-06-27 | End: 2024-06-27

## 2024-06-27 RX ORDER — VANCOMYCIN HYDROCHLORIDE 1 G/200ML
1000 INJECTION, SOLUTION INTRAVENOUS EVERY 12 HOURS
Status: DISCONTINUED | OUTPATIENT
Start: 2024-06-28 | End: 2024-06-28 | Stop reason: HOSPADM

## 2024-06-27 RX ORDER — HYDROMORPHONE HCL/PF 1 MG/ML
1 SYRINGE (ML) INJECTION ONCE
Status: DISCONTINUED | OUTPATIENT
Start: 2024-06-27 | End: 2024-06-27

## 2024-06-27 RX ADMIN — GADOBUTROL 8 ML: 604.72 INJECTION INTRAVENOUS at 23:37

## 2024-06-27 RX ADMIN — MORPHINE SULFATE 8 MG: 10 INJECTION INTRAVENOUS at 22:47

## 2024-06-27 RX ADMIN — SODIUM CHLORIDE 1000 ML: 0.9 INJECTION, SOLUTION INTRAVENOUS at 22:30

## 2024-06-27 RX ADMIN — HYDROMORPHONE HYDROCHLORIDE 0.5 MG: 1 INJECTION, SOLUTION INTRAMUSCULAR; INTRAVENOUS; SUBCUTANEOUS at 20:56

## 2024-06-27 RX ADMIN — IOHEXOL 100 ML: 350 INJECTION, SOLUTION INTRAVENOUS at 21:12

## 2024-06-27 RX ADMIN — CEFEPIME HYDROCHLORIDE 2000 MG: 2 INJECTION, SOLUTION INTRAVENOUS at 21:31

## 2024-06-28 VITALS
SYSTOLIC BLOOD PRESSURE: 178 MMHG | HEART RATE: 70 BPM | OXYGEN SATURATION: 97 % | DIASTOLIC BLOOD PRESSURE: 82 MMHG | HEIGHT: 69 IN | BODY MASS INDEX: 26.22 KG/M2 | WEIGHT: 177.03 LBS | TEMPERATURE: 97.5 F | RESPIRATION RATE: 17 BRPM

## 2024-06-28 PROBLEM — A41.9 SEPSIS WITHOUT ACUTE ORGAN DYSFUNCTION (HCC): Status: ACTIVE | Noted: 2024-05-19

## 2024-06-28 PROBLEM — G06.2 EPIDURAL ABSCESS: Status: ACTIVE | Noted: 2024-06-28

## 2024-06-28 LAB
ANION GAP SERPL CALCULATED.3IONS-SCNC: 9 MMOL/L (ref 4–13)
BASOPHILS # BLD AUTO: 0.06 THOUSANDS/ÂΜL (ref 0–0.1)
BASOPHILS NFR BLD AUTO: 1 % (ref 0–1)
BUN SERPL-MCNC: 14 MG/DL (ref 5–25)
CALCIUM SERPL-MCNC: 9.4 MG/DL (ref 8.4–10.2)
CHLORIDE SERPL-SCNC: 94 MMOL/L (ref 96–108)
CO2 SERPL-SCNC: 33 MMOL/L (ref 21–32)
CREAT SERPL-MCNC: 0.87 MG/DL (ref 0.6–1.3)
EOSINOPHIL # BLD AUTO: 0.01 THOUSAND/ÂΜL (ref 0–0.61)
EOSINOPHIL NFR BLD AUTO: 0 % (ref 0–6)
ERYTHROCYTE [DISTWIDTH] IN BLOOD BY AUTOMATED COUNT: 17.9 % (ref 11.6–15.1)
GFR SERPL CREATININE-BSD FRML MDRD: 89 ML/MIN/1.73SQ M
GLUCOSE P FAST SERPL-MCNC: 184 MG/DL (ref 65–99)
GLUCOSE SERPL-MCNC: 184 MG/DL (ref 65–140)
GLUCOSE SERPL-MCNC: 195 MG/DL (ref 65–140)
GLUCOSE SERPL-MCNC: 284 MG/DL (ref 65–140)
HCT VFR BLD AUTO: 36.8 % (ref 36.5–49.3)
HGB BLD-MCNC: 11.4 G/DL (ref 12–17)
IMM GRANULOCYTES # BLD AUTO: 0.09 THOUSAND/UL (ref 0–0.2)
IMM GRANULOCYTES NFR BLD AUTO: 1 % (ref 0–2)
LACTATE SERPL-SCNC: 1.8 MMOL/L (ref 0.5–2)
LYMPHOCYTES # BLD AUTO: 1.48 THOUSANDS/ÂΜL (ref 0.6–4.47)
LYMPHOCYTES NFR BLD AUTO: 11 % (ref 14–44)
MCH RBC QN AUTO: 24.2 PG (ref 26.8–34.3)
MCHC RBC AUTO-ENTMCNC: 31 G/DL (ref 31.4–37.4)
MCV RBC AUTO: 78 FL (ref 82–98)
MONOCYTES # BLD AUTO: 1.04 THOUSAND/ÂΜL (ref 0.17–1.22)
MONOCYTES NFR BLD AUTO: 8 % (ref 4–12)
NEUTROPHILS # BLD AUTO: 10.57 THOUSANDS/ÂΜL (ref 1.85–7.62)
NEUTS SEG NFR BLD AUTO: 79 % (ref 43–75)
NRBC BLD AUTO-RTO: 0 /100 WBCS
PLATELET # BLD AUTO: 387 THOUSANDS/UL (ref 149–390)
PMV BLD AUTO: 9.9 FL (ref 8.9–12.7)
POTASSIUM SERPL-SCNC: 3.8 MMOL/L (ref 3.5–5.3)
RBC # BLD AUTO: 4.72 MILLION/UL (ref 3.88–5.62)
SODIUM SERPL-SCNC: 136 MMOL/L (ref 135–147)
WBC # BLD AUTO: 13.25 THOUSAND/UL (ref 4.31–10.16)

## 2024-06-28 PROCEDURE — 80048 BASIC METABOLIC PNL TOTAL CA: CPT | Performed by: INTERNAL MEDICINE

## 2024-06-28 PROCEDURE — 36415 COLL VENOUS BLD VENIPUNCTURE: CPT

## 2024-06-28 PROCEDURE — 96361 HYDRATE IV INFUSION ADD-ON: CPT

## 2024-06-28 PROCEDURE — 85025 COMPLETE CBC W/AUTO DIFF WBC: CPT | Performed by: INTERNAL MEDICINE

## 2024-06-28 PROCEDURE — 99222 1ST HOSP IP/OBS MODERATE 55: CPT | Performed by: PHYSICIAN ASSISTANT

## 2024-06-28 PROCEDURE — 96376 TX/PRO/DX INJ SAME DRUG ADON: CPT

## 2024-06-28 PROCEDURE — 83605 ASSAY OF LACTIC ACID: CPT

## 2024-06-28 PROCEDURE — 82948 REAGENT STRIP/BLOOD GLUCOSE: CPT

## 2024-06-28 PROCEDURE — 96367 TX/PROPH/DG ADDL SEQ IV INF: CPT

## 2024-06-28 PROCEDURE — 99239 HOSP IP/OBS DSCHRG MGMT >30: CPT | Performed by: HOSPITALIST

## 2024-06-28 RX ORDER — MAGNESIUM HYDROXIDE/ALUMINUM HYDROXICE/SIMETHICONE 120; 1200; 1200 MG/30ML; MG/30ML; MG/30ML
30 SUSPENSION ORAL EVERY 6 HOURS PRN
Status: DISCONTINUED | OUTPATIENT
Start: 2024-06-28 | End: 2024-06-28 | Stop reason: HOSPADM

## 2024-06-28 RX ORDER — CLONAZEPAM 1 MG/1
1 TABLET ORAL 2 TIMES DAILY
Status: DISCONTINUED | OUTPATIENT
Start: 2024-06-28 | End: 2024-06-28 | Stop reason: HOSPADM

## 2024-06-28 RX ORDER — CLONAZEPAM 1 MG/1
1 TABLET ORAL DAILY PRN
Status: DISCONTINUED | OUTPATIENT
Start: 2024-06-28 | End: 2024-06-28 | Stop reason: HOSPADM

## 2024-06-28 RX ORDER — ESCITALOPRAM OXALATE 20 MG/1
20 TABLET ORAL DAILY
Status: CANCELLED | OUTPATIENT
Start: 2024-06-29

## 2024-06-28 RX ORDER — SENNOSIDES 8.6 MG
8.6 TABLET ORAL 2 TIMES DAILY
Status: CANCELLED | OUTPATIENT
Start: 2024-06-28

## 2024-06-28 RX ORDER — METHOCARBAMOL 500 MG/1
750 TABLET, FILM COATED ORAL EVERY 6 HOURS PRN
Status: CANCELLED | OUTPATIENT
Start: 2024-06-28

## 2024-06-28 RX ORDER — ENOXAPARIN SODIUM 100 MG/ML
40 INJECTION SUBCUTANEOUS DAILY
Status: CANCELLED | OUTPATIENT
Start: 2024-06-29

## 2024-06-28 RX ORDER — NICOTINE 21 MG/24HR
1 PATCH, TRANSDERMAL 24 HOURS TRANSDERMAL DAILY PRN
Status: CANCELLED | OUTPATIENT
Start: 2024-06-28

## 2024-06-28 RX ORDER — ONDANSETRON 2 MG/ML
4 INJECTION INTRAMUSCULAR; INTRAVENOUS EVERY 6 HOURS PRN
Status: DISCONTINUED | OUTPATIENT
Start: 2024-06-28 | End: 2024-06-28 | Stop reason: HOSPADM

## 2024-06-28 RX ORDER — NICOTINE 21 MG/24HR
1 PATCH, TRANSDERMAL 24 HOURS TRANSDERMAL DAILY PRN
Status: DISCONTINUED | OUTPATIENT
Start: 2024-06-28 | End: 2024-06-28 | Stop reason: HOSPADM

## 2024-06-28 RX ORDER — OXYCODONE HYDROCHLORIDE 5 MG/1
5 TABLET ORAL EVERY 4 HOURS PRN
Status: DISCONTINUED | OUTPATIENT
Start: 2024-06-28 | End: 2024-06-28 | Stop reason: HOSPADM

## 2024-06-28 RX ORDER — FOLIC ACID 1 MG/1
2000 TABLET ORAL DAILY
Status: CANCELLED | OUTPATIENT
Start: 2024-06-29

## 2024-06-28 RX ORDER — INSULIN LISPRO 100 [IU]/ML
1-5 INJECTION, SOLUTION INTRAVENOUS; SUBCUTANEOUS
Status: CANCELLED | OUTPATIENT
Start: 2024-06-28

## 2024-06-28 RX ORDER — CELECOXIB 100 MG/1
100 CAPSULE ORAL 2 TIMES DAILY
Status: CANCELLED | OUTPATIENT
Start: 2024-06-28

## 2024-06-28 RX ORDER — LOSARTAN POTASSIUM 50 MG/1
100 TABLET ORAL EVERY MORNING
Status: DISCONTINUED | OUTPATIENT
Start: 2024-06-28 | End: 2024-06-28 | Stop reason: HOSPADM

## 2024-06-28 RX ORDER — HYDROMORPHONE HCL/PF 1 MG/ML
1 SYRINGE (ML) INJECTION ONCE
Status: COMPLETED | OUTPATIENT
Start: 2024-06-28 | End: 2024-06-28

## 2024-06-28 RX ORDER — FOLIC ACID 1 MG/1
2000 TABLET ORAL DAILY
Status: DISCONTINUED | OUTPATIENT
Start: 2024-06-28 | End: 2024-06-28 | Stop reason: HOSPADM

## 2024-06-28 RX ORDER — NIFEDIPINE 30 MG/1
120 TABLET, EXTENDED RELEASE ORAL EVERY MORNING
Status: DISCONTINUED | OUTPATIENT
Start: 2024-06-28 | End: 2024-06-28 | Stop reason: HOSPADM

## 2024-06-28 RX ORDER — GABAPENTIN 400 MG/1
400 CAPSULE ORAL 2 TIMES DAILY
Status: CANCELLED | OUTPATIENT
Start: 2024-06-28

## 2024-06-28 RX ORDER — CLONAZEPAM 1 MG/1
1 TABLET ORAL 2 TIMES DAILY
Status: CANCELLED | OUTPATIENT
Start: 2024-06-28

## 2024-06-28 RX ORDER — HYDROCHLOROTHIAZIDE 25 MG/1
25 TABLET ORAL EVERY MORNING
Status: DISCONTINUED | OUTPATIENT
Start: 2024-06-28 | End: 2024-06-28 | Stop reason: HOSPADM

## 2024-06-28 RX ORDER — SENNOSIDES 8.6 MG
8.6 TABLET ORAL 2 TIMES DAILY
Status: DISCONTINUED | OUTPATIENT
Start: 2024-06-28 | End: 2024-06-28 | Stop reason: HOSPADM

## 2024-06-28 RX ORDER — ESCITALOPRAM OXALATE 20 MG/1
20 TABLET ORAL DAILY
Status: DISCONTINUED | OUTPATIENT
Start: 2024-06-28 | End: 2024-06-28 | Stop reason: HOSPADM

## 2024-06-28 RX ORDER — PRAVASTATIN SODIUM 20 MG
20 TABLET ORAL
Status: DISCONTINUED | OUTPATIENT
Start: 2024-06-28 | End: 2024-06-28 | Stop reason: HOSPADM

## 2024-06-28 RX ORDER — OXYCODONE HYDROCHLORIDE 5 MG/1
5 TABLET ORAL EVERY 4 HOURS PRN
Status: CANCELLED | OUTPATIENT
Start: 2024-06-28

## 2024-06-28 RX ORDER — VANCOMYCIN HYDROCHLORIDE 1 G/200ML
1000 INJECTION, SOLUTION INTRAVENOUS EVERY 12 HOURS
Status: CANCELLED | OUTPATIENT
Start: 2024-06-28

## 2024-06-28 RX ORDER — GABAPENTIN 400 MG/1
400 CAPSULE ORAL 2 TIMES DAILY
Status: DISCONTINUED | OUTPATIENT
Start: 2024-06-28 | End: 2024-06-28 | Stop reason: HOSPADM

## 2024-06-28 RX ORDER — CLONAZEPAM 1 MG/1
1 TABLET ORAL DAILY PRN
Status: CANCELLED | OUTPATIENT
Start: 2024-06-28

## 2024-06-28 RX ORDER — ONDANSETRON 2 MG/ML
4 INJECTION INTRAMUSCULAR; INTRAVENOUS EVERY 6 HOURS PRN
Status: CANCELLED | OUTPATIENT
Start: 2024-06-28

## 2024-06-28 RX ORDER — FINASTERIDE 5 MG/1
5 TABLET, FILM COATED ORAL DAILY
Status: CANCELLED | OUTPATIENT
Start: 2024-06-29

## 2024-06-28 RX ORDER — ACETAMINOPHEN 325 MG/1
975 TABLET ORAL EVERY 8 HOURS SCHEDULED
Status: CANCELLED | OUTPATIENT
Start: 2024-06-28

## 2024-06-28 RX ORDER — CEFEPIME HYDROCHLORIDE 2 G/50ML
2000 INJECTION, SOLUTION INTRAVENOUS EVERY 12 HOURS
Status: DISCONTINUED | OUTPATIENT
Start: 2024-06-28 | End: 2024-06-28 | Stop reason: HOSPADM

## 2024-06-28 RX ORDER — CEFEPIME HYDROCHLORIDE 2 G/50ML
2000 INJECTION, SOLUTION INTRAVENOUS EVERY 12 HOURS
Status: CANCELLED | OUTPATIENT
Start: 2024-06-28

## 2024-06-28 RX ORDER — TAMSULOSIN HYDROCHLORIDE 0.4 MG/1
0.4 CAPSULE ORAL
Status: DISCONTINUED | OUTPATIENT
Start: 2024-06-28 | End: 2024-06-28 | Stop reason: HOSPADM

## 2024-06-28 RX ORDER — TAMSULOSIN HYDROCHLORIDE 0.4 MG/1
0.4 CAPSULE ORAL
Status: CANCELLED | OUTPATIENT
Start: 2024-06-28

## 2024-06-28 RX ORDER — CELECOXIB 100 MG/1
100 CAPSULE ORAL 2 TIMES DAILY
Status: DISCONTINUED | OUTPATIENT
Start: 2024-06-28 | End: 2024-06-28 | Stop reason: HOSPADM

## 2024-06-28 RX ORDER — ACETAMINOPHEN 325 MG/1
975 TABLET ORAL EVERY 8 HOURS SCHEDULED
Status: DISCONTINUED | OUTPATIENT
Start: 2024-06-28 | End: 2024-06-28 | Stop reason: HOSPADM

## 2024-06-28 RX ORDER — INSULIN LISPRO 100 [IU]/ML
1-5 INJECTION, SOLUTION INTRAVENOUS; SUBCUTANEOUS
Status: DISCONTINUED | OUTPATIENT
Start: 2024-06-28 | End: 2024-06-28 | Stop reason: HOSPADM

## 2024-06-28 RX ORDER — HYDROXYZINE HYDROCHLORIDE 25 MG/1
50 TABLET, FILM COATED ORAL 2 TIMES DAILY
Status: DISCONTINUED | OUTPATIENT
Start: 2024-06-28 | End: 2024-06-28 | Stop reason: HOSPADM

## 2024-06-28 RX ORDER — HYDROXYZINE HYDROCHLORIDE 25 MG/1
50 TABLET, FILM COATED ORAL 2 TIMES DAILY
Status: CANCELLED | OUTPATIENT
Start: 2024-06-28

## 2024-06-28 RX ORDER — HYDROCHLOROTHIAZIDE 25 MG/1
25 TABLET ORAL EVERY MORNING
Status: CANCELLED | OUTPATIENT
Start: 2024-06-29

## 2024-06-28 RX ORDER — LOSARTAN POTASSIUM 50 MG/1
100 TABLET ORAL EVERY MORNING
Status: CANCELLED | OUTPATIENT
Start: 2024-06-29

## 2024-06-28 RX ORDER — LORATADINE 10 MG/1
10 TABLET ORAL DAILY
Status: DISCONTINUED | OUTPATIENT
Start: 2024-06-28 | End: 2024-06-28 | Stop reason: HOSPADM

## 2024-06-28 RX ORDER — HYDROMORPHONE HCL/PF 1 MG/ML
0.5 SYRINGE (ML) INJECTION
Status: DISCONTINUED | OUTPATIENT
Start: 2024-06-28 | End: 2024-06-28 | Stop reason: HOSPADM

## 2024-06-28 RX ORDER — MIRTAZAPINE 15 MG/1
45 TABLET, FILM COATED ORAL
Status: CANCELLED | OUTPATIENT
Start: 2024-06-28

## 2024-06-28 RX ORDER — ENOXAPARIN SODIUM 100 MG/ML
40 INJECTION SUBCUTANEOUS DAILY
Status: DISCONTINUED | OUTPATIENT
Start: 2024-06-28 | End: 2024-06-28 | Stop reason: HOSPADM

## 2024-06-28 RX ORDER — NIFEDIPINE 30 MG/1
30 TABLET, EXTENDED RELEASE ORAL DAILY
Status: DISCONTINUED | OUTPATIENT
Start: 2024-06-28 | End: 2024-06-28

## 2024-06-28 RX ORDER — INSULIN LISPRO 100 [IU]/ML
1-6 INJECTION, SOLUTION INTRAVENOUS; SUBCUTANEOUS
Status: DISCONTINUED | OUTPATIENT
Start: 2024-06-28 | End: 2024-06-28 | Stop reason: HOSPADM

## 2024-06-28 RX ORDER — METHOCARBAMOL 500 MG/1
750 TABLET, FILM COATED ORAL EVERY 6 HOURS PRN
Status: DISCONTINUED | OUTPATIENT
Start: 2024-06-28 | End: 2024-06-28 | Stop reason: HOSPADM

## 2024-06-28 RX ORDER — PRAVASTATIN SODIUM 20 MG
20 TABLET ORAL
Status: CANCELLED | OUTPATIENT
Start: 2024-06-28

## 2024-06-28 RX ORDER — HYDROMORPHONE HCL/PF 1 MG/ML
0.5 SYRINGE (ML) INJECTION
Status: CANCELLED | OUTPATIENT
Start: 2024-06-28

## 2024-06-28 RX ORDER — INSULIN LISPRO 100 [IU]/ML
1-6 INJECTION, SOLUTION INTRAVENOUS; SUBCUTANEOUS
Status: CANCELLED | OUTPATIENT
Start: 2024-06-28

## 2024-06-28 RX ORDER — LORATADINE 10 MG/1
10 TABLET ORAL DAILY
Status: CANCELLED | OUTPATIENT
Start: 2024-06-29

## 2024-06-28 RX ORDER — MAGNESIUM HYDROXIDE/ALUMINUM HYDROXICE/SIMETHICONE 120; 1200; 1200 MG/30ML; MG/30ML; MG/30ML
30 SUSPENSION ORAL EVERY 6 HOURS PRN
Status: CANCELLED | OUTPATIENT
Start: 2024-06-28

## 2024-06-28 RX ORDER — MIRTAZAPINE 15 MG/1
45 TABLET, FILM COATED ORAL
Status: DISCONTINUED | OUTPATIENT
Start: 2024-06-28 | End: 2024-06-28 | Stop reason: HOSPADM

## 2024-06-28 RX ORDER — SERTRALINE HYDROCHLORIDE 100 MG/1
100 TABLET, FILM COATED ORAL EVERY MORNING
Status: DISCONTINUED | OUTPATIENT
Start: 2024-06-28 | End: 2024-06-28

## 2024-06-28 RX ORDER — NIFEDIPINE 30 MG/1
120 TABLET, EXTENDED RELEASE ORAL EVERY MORNING
Status: CANCELLED | OUTPATIENT
Start: 2024-06-29

## 2024-06-28 RX ORDER — FINASTERIDE 5 MG/1
5 TABLET, FILM COATED ORAL DAILY
Status: DISCONTINUED | OUTPATIENT
Start: 2024-06-28 | End: 2024-06-28 | Stop reason: HOSPADM

## 2024-06-28 RX ADMIN — METOPROLOL SUCCINATE 75 MG: 50 TABLET, EXTENDED RELEASE ORAL at 09:12

## 2024-06-28 RX ADMIN — FINASTERIDE 5 MG: 5 TABLET, FILM COATED ORAL at 09:15

## 2024-06-28 RX ADMIN — GABAPENTIN 400 MG: 400 CAPSULE ORAL at 09:14

## 2024-06-28 RX ADMIN — LORATADINE 10 MG: 10 TABLET ORAL at 09:13

## 2024-06-28 RX ADMIN — CELECOXIB 100 MG: 100 CAPSULE ORAL at 09:13

## 2024-06-28 RX ADMIN — ESCITALOPRAM OXALATE 20 MG: 20 TABLET ORAL at 10:36

## 2024-06-28 RX ADMIN — VANCOMYCIN HYDROCHLORIDE 1000 MG: 1 INJECTION, SOLUTION INTRAVENOUS at 00:08

## 2024-06-28 RX ADMIN — ACETAMINOPHEN 975 MG: 325 TABLET, FILM COATED ORAL at 06:05

## 2024-06-28 RX ADMIN — VANCOMYCIN HYDROCHLORIDE 1000 MG: 1 INJECTION, SOLUTION INTRAVENOUS at 10:37

## 2024-06-28 RX ADMIN — NIFEDIPINE 120 MG: 30 TABLET, EXTENDED RELEASE ORAL at 09:12

## 2024-06-28 RX ADMIN — HYDROCHLOROTHIAZIDE 25 MG: 25 TABLET ORAL at 09:15

## 2024-06-28 RX ADMIN — HYDROMORPHONE HYDROCHLORIDE 0.5 MG: 1 INJECTION, SOLUTION INTRAMUSCULAR; INTRAVENOUS; SUBCUTANEOUS at 06:09

## 2024-06-28 RX ADMIN — CLONAZEPAM 1 MG: 1 TABLET ORAL at 09:14

## 2024-06-28 RX ADMIN — HYDROXYZINE HYDROCHLORIDE 50 MG: 25 TABLET ORAL at 09:15

## 2024-06-28 RX ADMIN — ASPIRIN 81 MG: 81 TABLET, COATED ORAL at 09:15

## 2024-06-28 RX ADMIN — FOLIC ACID 2000 MCG: 1 TABLET ORAL at 09:15

## 2024-06-28 RX ADMIN — CEFEPIME HYDROCHLORIDE 2000 MG: 2 INJECTION, SOLUTION INTRAVENOUS at 09:16

## 2024-06-28 RX ADMIN — LOSARTAN POTASSIUM 100 MG: 50 TABLET, FILM COATED ORAL at 09:15

## 2024-06-28 RX ADMIN — ENOXAPARIN SODIUM 40 MG: 40 INJECTION SUBCUTANEOUS at 09:12

## 2024-06-28 RX ADMIN — SENNOSIDES 8.6 MG: 8.6 TABLET, FILM COATED ORAL at 09:15

## 2024-06-28 RX ADMIN — INSULIN LISPRO 2 UNITS: 100 INJECTION, SOLUTION INTRAVENOUS; SUBCUTANEOUS at 08:36

## 2024-06-28 RX ADMIN — CLONAZEPAM 1 MG: 1 TABLET ORAL at 13:26

## 2024-06-28 RX ADMIN — INSULIN LISPRO 2 UNITS: 100 INJECTION, SOLUTION INTRAVENOUS; SUBCUTANEOUS at 12:17

## 2024-06-28 RX ADMIN — HYDROMORPHONE HYDROCHLORIDE 1 MG: 1 INJECTION, SOLUTION INTRAMUSCULAR; INTRAVENOUS; SUBCUTANEOUS at 01:22

## 2024-06-28 RX ADMIN — OXYCODONE HYDROCHLORIDE 5 MG: 5 TABLET ORAL at 13:27

## 2024-06-28 RX ADMIN — OXYCODONE HYDROCHLORIDE 5 MG: 5 TABLET ORAL at 04:15

## 2024-06-28 NOTE — H&P
"[facility]  H&P  Name: Juan San 68 y.o. male I MRN: [id number]  Unit/Bed#: -01 I Date of Admission: 6/27/2024   Date of Service: 6/28/2024 I Hospital Day: 0      Assessment & Plan   * Discitis of lumbosacral region  Assessment & Plan  Presents with worsening lower back pain radiating to bilateral LE (L> R)  Extensive history of lumbar fusion in April 2023 complicated by discitis in January 2024 requiring IV antibiotics-completed IV vancomycin and cefepime outpatient 3/8/2024  Was evaluated by [facility] neurosurgery team for second opinion due to ongoing pain on 6/11 and was taken to the OR for washout on 6/14 and started on IV vancomycin via PICC line x 8 weeks through 8/9 per JESS Rincon, ID  MRI lumbar spine obtained in the ED: \"Persistent L5-S1 discitis, concerning for residual infection. Severe L5-S1 central canal stenosis and neural foraminal narrowing. Fluid collection along the left lateral aspect of the L5-S1 disc and epidural space measuring 3.5 x 1.2 x 1.3 cm.\"  Patient accepted in transfer to [facility], however pending bed availability  Per ED, neurosurgery at [facility] felt that lack of enhancement of collection was reassuring-recommends continuing IV antibiotics and transfer once bed available  Dr Antwon Capone (neurosurgery) phone number directly is [phone]   Pain control with oxycodone 2.5/5 mg as needed, scheduled Tylenol, Robaxin, and IV Dilaudid for breakthrough pain    Sepsis without acute organ dysfunction (HCC)  Assessment & Plan  SIRS criteria: Tachycardia and tachypnea, also with leukocytosis  Possible discitis/fluid collection of epidural space noted on MRI L-spine  Patient does have lactic acidosis, however takes metformin for diabetes and admits to decreased oral intake-suspect that this is lactic acidosis secondary to this not to endorgan dysfunction  Continue vancomycin and cefepime  Follow-up blood cultures  Plan to transfer to [facility] once bed " "available    Type 2 diabetes mellitus with hyperglycemia, without long-term current use of insulin (HCC)  Assessment & Plan  Lab Results   Component Value Date    HGBA1C 7.9 (H) 05/19/2024       No results for input(s): \"POCGLU\" in the last 72 hours.    Blood Sugar Average: Last 72 hrs:  Home regimen: Metformin 1000 mg twice daily and Jardiance 10 Mg daily  Hold oral medications and placed on SSI AC/at bedtime while inpatient      Hypertension  Assessment & Plan  Continue home HCTZ 25 Mg daily, losartan 100 Mg daily, Toprol 75 Mg daily, nifedipine 120 Mg daily    Cirrhosis, alcoholic (HCC)  Assessment & Plan  No transaminitis, and T. bili and alk phos are normal    Thyroid cancer (HCC)  Assessment & Plan  Status post partial thyroidectomy  Not maintained on Synthroid as levels have been stable  Follows with Horacio Jackson, remains in remission    Benign prostatic hyperplasia with urinary retention  Assessment & Plan  Continue home finasteride and Flomax  History of urinary retention with severe pain-placed on urinary retention protocol           VTE Pharmacologic Prophylaxis: VTE Score: 4 Moderate Risk (Score 3-4) - Pharmacological DVT Prophylaxis Ordered: enoxaparin (Lovenox).  Code Status: Level 1 - Full Code   Discussion with family: Patient declined call to .     Anticipated Length of Stay: Patient will be admitted on an observation basis with an anticipated length of stay of less than 2 midnights secondary to discitis.    Chief Complaint: \" I have a lot of pain in my back today\"    History of Present Illness:  Juan San is a 68 y.o. male with a PMH of lumbar spinal fusion complicated by discitis requiring IV antibiotics x 2 (currently undergoing IV vancomycin therapy via PICC line), HTN, NIDDM 2, and thyroid cancer status post partial thyroidectomy who presents with increase in lower back pain today.  Patient reports pain radiates down bilateral lower legs, left greater than right.  Reports taking " Tylenol and tramadol at home without significant improvement.  Denies saddle anesthesia.  No new numbness or tingling.  Reports feeling weaker when walking.  No fevers or chills.  Denies chest pain or dyspnea.  No other acute symptoms.    Review of Systems:  Review of Systems   Constitutional:  Negative for chills and fever.   HENT:  Negative for congestion.    Respiratory:  Negative for cough and shortness of breath.    Cardiovascular:  Negative for chest pain and leg swelling.   Gastrointestinal:  Negative for abdominal pain, diarrhea, nausea and vomiting.   Musculoskeletal:  Positive for back pain and gait problem.   Neurological:  Positive for weakness (Feels weaker when walking). Negative for numbness.   All other systems reviewed and are negative.      Past Medical and Surgical History:   Past Medical History:   Diagnosis Date    Anxiety     Arthritis     Cancer (HCC)     Chronic back pain     Depression     Diabetes mellitus (HCC)     Hypertension     Liver disease     Mitral valve prolapse     Peripheral neuropathy     Neuropathy    PONV (postoperative nausea and vomiting)     Thyroid disease        Past Surgical History:   Procedure Laterality Date    COLONOSCOPY      INCISION AND DRAINAGE OF WOUND Left 08/12/2022    Procedure: INCISION AND DRAINAGE (I&D) EXTREMITY;  Surgeon: Moustapha Cote DPM;  Location:  MAIN OR;  Service: Podiatry    IR BIOPSY SPINE  1/30/2024    IR BIOPSY SPINE  2/1/2024    IR PICC PLACEMENT SINGLE LUMEN  2/6/2024    JOINT REPLACEMENT Left 03/11/2022    Left TSA    CT ARTHRODESIS POSTERIOR/PSTLAT TQ 1NTRSPC LUMBAR Bilateral 04/11/2023    Procedure: L1-S1 navigated posterior decompression with instrumented fixation fusion;  Surgeon: James Moraes MD;  Location:  MAIN OR;  Service: Neurosurgery    THYROID SURGERY  2021    remove cancer       Meds/Allergies:  Prior to Admission medications    Medication Sig Start Date End Date Taking? Authorizing Provider   celecoxib (CeleBREX) 100  mg capsule Take 1 capsule (100 mg total) by mouth 2 (two) times a day Take with food 3/4/24  Yes RUIZ Logan   clonazePAM (KlonoPIN) 1 mg tablet Take 1 mg by mouth 2 (two) times a day & 3rd pill PRN 10/24/22  Yes Historical Provider, MD   finasteride (PROSCAR) 5 mg tablet Take 1 tablet (5 mg total) by mouth daily 7/18/23  Yes Benedicto Rice MD   fluticasone (FLONASE) 50 mcg/act nasal spray 1 spray into each nostril daily as needed   Yes Historical Provider, MD   folic acid (FOLVITE) 1 mg tablet Take 2,000 mcg by mouth daily 12/17/18  Yes Historical Provider, MD   gabapentin (NEURONTIN) 400 mg capsule Take 1 capsule (400 mg total) by mouth 2 (two) times a day  Patient taking differently: Take 100 mg by mouth daily 3/4/24  Yes RUIZ Logan   GNP Aspirin Low Dose 81 MG EC tablet Take 1 tablet (81 mg total) by mouth daily Do not start before April 25, 2023. 4/25/23  Yes Katja Oshea PA-C   hydrOXYzine pamoate (VISTARIL) 50 mg capsule Take 50 mg by mouth 2 (two) times a day. Indications: Feeling Anxious   Yes Historical Provider, MD   Jardiance 10 MG TABS Take 10 mg by mouth every morning 7/20/22  Yes Historical Provider, MD   loratadine (CLARITIN) 10 mg tablet Take 10 mg by mouth daily. per med list humberto    Indications: Hayfever   Yes Historical Provider, MD   losartan-hydrochlorothiazide (HYZAAR) 100-25 MG per tablet Take 1 tablet by mouth every morning 12/17/18  Yes Historical Provider, MD   lovastatin (MEVACOR) 20 mg tablet Take 20 mg by mouth every morning 12/17/18  Yes Historical Provider, MD   metFORMIN (GLUCOPHAGE) 1000 MG tablet Take 1,000 mg by mouth 2 (two) times a day with meals  1/2/19  Yes Historical Provider, MD   methocarbamol (ROBAXIN) 750 mg tablet Take 1 tablet (750 mg total) by mouth every 6 (six) hours as needed for muscle spasms 5/21/24  Yes Janee Zuleta PA-C   metoprolol succinate (TOPROL-XL) 50 mg 24 hr tablet TAKE 1 AND 1/2 TABLET BY MOUTH ONCE  DAILY 4/3/24  Yes Historical Provider, MD   mirtazapine (REMERON) 45 MG tablet Take 45 mg by mouth daily at bedtime 12/11/18  Yes Historical Provider, MD   NIFEdipine (PROCARDIA XL) 30 mg 24 hr tablet TAKE 1 TABLET BY MOUTH EVERY DAY IN ADDITION TO 90MG FOR A TOTAL OF 120MG EVERY DAY 2/20/24  Yes Historical Provider, MD   NIFEdipine (PROCARDIA XL) 90 mg 24 hr tablet Take 120 mg by mouth every morning Takes 90 mg and 30 mg =120 mg every AM 12/17/18  Yes Historical Provider, MD   senna (SENOKOT) 8.6 mg Take 1 tablet (8.6 mg total) by mouth 2 (two) times a day 2 tabs at bedtime as needed for constipation per latest dci 5/21/24  Yes Janee Zuleta PA-C   sertraline (ZOLOFT) 100 mg tablet Take 100 mg by mouth every morning   Yes Historical Provider, MD   tamsulosin (FLOMAX) 0.4 mg Take 1 capsule (0.4 mg total) by mouth daily with dinner 7/18/23  Yes Benedicto Rice MD   Vancomycin HCl 10 g SOLR  2/29/24  Yes Historical Provider, MD ANDERSON FABIANO PLUS test strip 4 (four) times a day 12/17/18   Historical Provider, MD ANDERSON FASTCLIX LANCETS MISC 4 (four) times a day 12/22/18   Historical Provider, MD   acetaminophen (TYLENOL) 500 mg tablet Take 2 tablets (1,000 mg total) by mouth every 8 (eight) hours 500 mg tablet 5/21/24   Janee Zuleta PA-C   Azelastine HCl 137 MCG/SPRAY SOLN Every 12 hours  Patient not taking: Reported on 5/29/2024    Historical Provider, MD   ketoconazole (NIZORAL) 2 % shampoo Apply 1 application. topically daily Use as directed 10/10/18   Historical Provider, MD   lidocaine (LIDODERM) 5 % Apply 1 patch topically over 12 hours daily Remove & Discard patch within 12 hours or as directed by MD 2/27/24   Harinder Marin MD   ondansetron (Zofran ODT) 4 mg disintegrating tablet Take 1 tablet (4 mg total) by mouth every 6 (six) hours as needed for nausea or vomiting 5/21/24   Janee Zuleta PA-C   patient supplied medication medical marijuana as needed per latest dci  Indications: .     Historical Provider, MD   ARIPiprazole (ABILIFY) 30 mg tablet Take 30 mg by mouth daily at bedtime  Patient not taking: Reported on 7/29/2022 12/17/18 8/9/22  Historical Provider, MD   ceFEPime (MAXIPIME) 2 g injection  3/7/24 6/28/24  Historical Provider, MD   Cholecalciferol 125 MCG (5000 UT) capsule every 24 hours  Patient not taking: Reported on 5/24/2024 6/28/24  Historical Provider, MD   DIGOX 250 MCG tablet TAKE 1 TABLET BY MOUTH EVERY DAY BUT DO NOT TAKE ON SUNDAY OR WEDNESDAY  Patient not taking: Reported on 7/29/2022 12/17/18 8/9/22  Historical Provider, MD   glipiZIDE (GLUCOTROL XL) 10 mg 24 hr tablet Take 10 mg by mouth 2 (two) times a day. Indications: Type 2 Diabetes 9/8/22 9/8/22  Sabra Magaña DO   HUMULIN N 100 UNIT/ML subcutaneous injection INJECT 40 UNITS IN THE MORNING AND 6 UNITS in THE IN THE EVENING AS DIRECTED  Patient not taking: Reported on 8/9/2022 12/27/18 8/9/22  Historical Provider, MD   ondansetron (ZOFRAN) 4 mg tablet Take 1 tablet (4 mg total) by mouth every 6 (six) hours as needed for nausea or vomiting  Patient not taking: Reported on 7/29/2022 4/29/22 8/9/22  Stefany Urbina DO   propranolol (INDERAL LA) 160 mg Take 160 mg by mouth daily 12/17/18 9/16/22  Historical Provider, MD     I have reviewed home medications with patient personally.    Allergies:   Allergies   Allergen Reactions    Abilify [Aripiprazole] Tremor     Shaking      Cephalexin Diarrhea    Molds & Smuts Allergic Rhinitis    Pregabalin Tremor     Lyrica - shaking feeling       Social History:  Marital Status: /Civil Union   Occupation: Not assessed  Patient Pre-hospital Living Situation: Home, With spouse  Patient Pre-hospital Level of Mobility: walks  Patient Pre-hospital Diet Restrictions: None  Substance Use History:   Social History     Substance and Sexual Activity   Alcohol Use Yes     Social History     Tobacco Use   Smoking Status Some Days    Current packs/day: 0.00    Average packs/day:  "0.3 packs/day for 5.9 years (1.5 ttl pk-yrs)    Types: Cigarettes    Start date: 1975    Last attempt to quit: 1981    Years since quittin.1   Smokeless Tobacco Never   Tobacco Comments    quit ; smokes when in pain as of 26     Social History     Substance and Sexual Activity   Drug Use Yes    Frequency: 7.0 times per week    Types: Marijuana    Comment: Medical Marijuana, takes for pain, daily, vape       Family History:  Family History   Problem Relation Age of Onset    No Known Problems Father     No Known Problems Mother     Colon cancer Neg Hx        Physical Exam:     Vitals:   Blood Pressure: 163/83 (24 0402)  Pulse: 71 (24 0402)  Temperature: 98.6 °F (37 °C) (24 0352)  Temp Source: Oral (24)  Respirations: 17 (24 0200)  Height: 5' 9\" (175.3 cm) (24)  Weight - Scale: 80.3 kg (177 lb) (24)  SpO2: 100 % (24 0402)    Physical Exam  Vitals and nursing note reviewed.   Constitutional:       General: He is not in acute distress.     Appearance: Normal appearance. He is not ill-appearing.      Comments: Pleasant and conversational.  Winces in pain with some movements.   HENT:      Head: Normocephalic.      Nose: Nose normal.      Mouth/Throat:      Mouth: Mucous membranes are moist.   Eyes:      Conjunctiva/sclera: Conjunctivae normal.   Cardiovascular:      Rate and Rhythm: Normal rate and regular rhythm.      Pulses: Normal pulses.   Pulmonary:      Effort: Pulmonary effort is normal.      Breath sounds: Normal breath sounds.   Abdominal:      Palpations: Abdomen is soft.      Tenderness: There is no abdominal tenderness.   Musculoskeletal:      Cervical back: Normal range of motion.      Comments: Lower lumbar spinous process tenderness to palpation.  Positive straight leg raise on the left.  5 out of 5 strength in bilateral lower extremities.  Sensory intact distally.   Skin:     General: Skin is warm and dry.      Coloration: " Skin is not pale.   Neurological:      General: No focal deficit present.      Mental Status: He is alert and oriented to person, place, and time.   Psychiatric:         Mood and Affect: Mood normal.         Thought Content: Thought content normal.          Additional Data:     Lab Results:  Results from last 7 days   Lab Units 06/28/24  0416   WBC Thousand/uL 13.25*   HEMOGLOBIN g/dL 11.4*   HEMATOCRIT % 36.8   PLATELETS Thousands/uL 387   SEGS PCT % 79*   LYMPHO PCT % 11*   MONO PCT % 8   EOS PCT % 0     Results from last 7 days   Lab Units 06/27/24  2042   SODIUM mmol/L 133*   POTASSIUM mmol/L 3.9   CHLORIDE mmol/L 91*   CO2 mmol/L 29   BUN mg/dL 13   CREATININE mg/dL 0.83   ANION GAP mmol/L 13   CALCIUM mg/dL 10.0   ALBUMIN g/dL 4.5   TOTAL BILIRUBIN mg/dL 0.39   ALK PHOS U/L 79   ALT U/L 10   AST U/L 16   GLUCOSE RANDOM mg/dL 234*     Results from last 7 days   Lab Units 06/27/24  2042   INR  0.97         Lab Results   Component Value Date    HGBA1C 7.9 (H) 05/19/2024    HGBA1C 8.2 (H) 01/24/2024    HGBA1C 6.3 (H) 03/14/2023     Results from last 7 days   Lab Units 06/28/24  0007 06/27/24 2042   LACTIC ACID mmol/L 1.8 2.5*   PROCALCITONIN ng/ml  --  0.09       Lines/Drains:  Invasive Devices       Peripheral Intravenous Line  Duration             Peripheral IV 06/27/24 Left Antecubital <1 day                        Imaging: Reviewed radiology reports from this admission including: abdominal/pelvic CT and MRI spine  MRI lumbar spine w wo contrast   Final Result by Rik Sagastume MD (06/28 0055)         1. Persistent L5-S1 discitis, concerning for residual infection. Severe L5-S1 central canal stenosis and neural foraminal narrowing   2. Fluid collection along the left lateral aspect of the L5-S1 disc and epidural space measuring 3.5 x 1.2 x 1.3 cm. Recommend neurosurgical consultation.         The study was marked in EPIC for immediate notification.               Workstation performed: [id number]          CT abdomen pelvis with contrast   Final Result by Willian Mina DO (06/27 2246)   Limited evaluation of the spine with redemonstrated findings as detailed above. CT is overall limited for evaluation of the spine especially for evaluating aforementioned epidural collections. Consider further evaluation with contrast-enhanced MRI of the    lumbar spine.         Workstation performed: [id number]         CT recon only lumbar spine (No Charge)   Final Result by Willian Mina DO (06/27 2246)   Limited evaluation of the spine with redemonstrated findings as detailed above. CT is overall limited for evaluation of the spine especially for evaluating aforementioned epidural collections. Consider further evaluation with contrast-enhanced MRI of the    lumbar spine.         Workstation performed: [id number]         XR chest pa & lateral    (Results Pending)       EKG and Other Studies Reviewed on Admission:   EKG: No EKG obtained.    ** Please Note: This note has been constructed using a voice recognition system. **

## 2024-06-28 NOTE — ASSESSMENT & PLAN NOTE
"Presents with worsening lower back pain radiating to bilateral LE (L> R)  Extensive history of lumbar fusion in April 2023 complicated by discitis in January 2024 requiring IV antibiotics-completed IV vancomycin and cefepime outpatient 3/8/2024  Was evaluated by [facility] neurosurgery team for second opinion due to ongoing pain on 6/11 and was taken to the OR for washout on 6/14 and started on IV vancomycin via PICC line x 8 weeks through 8/9 per Xu Candler, ID  MRI lumbar spine obtained in the ED: \"Persistent L5-S1 discitis, concerning for residual infection. Severe L5-S1 central canal stenosis and neural foraminal narrowing. Fluid collection along the left lateral aspect of the L5-S1 disc and epidural space measuring 3.5 x 1.2 x 1.3 cm.\"  Patient accepted in transfer to [facility], however pending bed availability  Per ED, neurosurgery at [facility] felt that lack of enhancement of collection was reassuring-recommends continuing IV antibiotics and transfer once bed available  Dr Antwon Capone (neurosurgery) phone number directly is [phone]   Pain control with oxycodone 2.5/5 mg as needed, scheduled Tylenol, Robaxin, and IV Dilaudid for breakthrough pain  "

## 2024-06-28 NOTE — PROGRESS NOTES
Juan San is a 68 y.o. male who is currently ordered Vancomycin IV with management by the Pharmacy Consult service.  Relevant clinical data and objective / subjective history reviewed.  Vancomycin Assessment:  Indication and Goal AUC/Trough: Bone/joint infection (goal -600, trough >10)  Clinical Status: stable  Micro:   Pending  Renal Function:  SCr: 0.87 mg/dL  CrCl: 83.5 mL/min  Renal replacement: Not on dialysis  Days of Therapy: 2 - Vancomycin therapy until 8/4/24   Current Dose: 1000mg every 12 hours  Vancomycin Plan:  New Dosing: No change  Estimated AUC: 519 mcg*hr/mL  Estimated Trough: 15 mcg/mL  Next Level: 6/30/24 at 0600  Renal Function Monitoring: Daily BMP and UOP  Pharmacy will continue to follow closely for s/sx of nephrotoxicity, infusion reactions and appropriateness of therapy.  BMP and CBC will be ordered per protocol. We will continue to follow the patient’s culture results and clinical progress daily. Also, cefepime will be adjusted if necessary.    Bi Perez, Pharmacist, PharmD, BCPS

## 2024-06-28 NOTE — ASSESSMENT & PLAN NOTE
SIRS criteria: Tachycardia and tachypnea, also with leukocytosis  Possible discitis/fluid collection of epidural space noted on MRI L-spine  Patient does have lactic acidosis, however takes metformin for diabetes and admits to decreased oral intake-suspect that this is lactic acidosis secondary to this not to endorgan dysfunction  Continue vancomycin and cefepime  Follow-up blood cultures  Plan to transfer to [facility]

## 2024-06-28 NOTE — ASSESSMENT & PLAN NOTE
Continue home HCTZ 25 Mg daily, losartan 100 Mg daily, Toprol 75 Mg daily, nifedipine 120 Mg daily

## 2024-06-28 NOTE — ASSESSMENT & PLAN NOTE
Continue home finasteride and Flomax  History of urinary retention with severe pain-placed on urinary retention protocol

## 2024-06-28 NOTE — ASSESSMENT & PLAN NOTE
"Presents with worsening lower back pain radiating to bilateral LE (L> R)  Extensive history of lumbar fusion in April 2023 complicated by discitis in January 2024 requiring IV antibiotics-completed IV vancomycin and cefepime outpatient 3/8/2024  Was evaluated by [facility] neurosurgery team for second opinion due to ongoing pain on 6/11 and was taken to the OR for washout on 6/14 and started on IV vancomycin via PICC line x 8 weeks through 8/9 per Xu [place], ID  MRI lumbar spine obtained in the ED: \"Persistent L5-S1 discitis, concerning for residual infection. Severe L5-S1 central canal stenosis and neural foraminal narrowing. Fluid collection along the left lateral aspect of the L5-S1 disc and epidural space measuring 3.5 x 1.2 x 1.3 cm.\"  Patient accepted in transfer to [facility], however pending bed availability  Per ED, neurosurgery at [facility] felt that lack of enhancement of collection was reassuring-recommends continuing IV antibiotics and transfer once bed available  Dr Antwon Capone (neurosurgery) phone number directly is [phone]   Pain control with oxycodone 2.5/5 mg as needed, scheduled Tylenol, Robaxin, and IV Dilaudid for breakthrough pain  "

## 2024-06-28 NOTE — ASSESSMENT & PLAN NOTE
"Lab Results   Component Value Date    HGBA1C 7.9 (H) 05/19/2024       No results for input(s): \"POCGLU\" in the last 72 hours.    Blood Sugar Average: Last 72 hrs:  Home regimen: Metformin 1000 mg twice daily and Jardiance 10 Mg daily  Hold oral medications and placed on SSI AC/at bedtime while inpatient    "

## 2024-06-28 NOTE — ED PROVIDER NOTES
"History  Chief Complaint   Patient presents with    Back Pain    Vomiting     Patient presents to ER from home for reports of worsening lumbar back pain s/p I&D of abscess at L5 a week ago. Had a spinal surgery done there in 2023 at site. Patient now has a PICC line in [facility] for IV abx. Patient reports pain radiates into waist and down R leg, now reporting n/v. Trouble ambulating, uses walker at baseline.      Patient is a 68-year-old male past medical history of recent I&D of \"abscess in my back.\"  Patient arriving for worsening lower back pain that is radiating down bilateral legs R worse than left and his legs feel weaker when ambulating.  Patient denies any current urinary or fecal incontinence but reports that a week ago he had urgency trying to get to the bathroom states he was in too much pain to walk quickly but was aware he needed to urinate.  Patient denies any saddle anesthesias any perineal paresthesias.  States that he had surgery at [place] and was discharged with a PICC line for vancomycin.  While patient states that he feels weaker when he is walking, he has 5 out of 5 strength and is able to do a straight leg lift bilaterally.  Patient denies any new numbness or tingling states that he has a history of neuropathy that is unchanged.  Patient has well-appearing incision with no gross erythema, no crepitus, no lymphangitic streaking, no pain out of proportion.        Prior to Admission Medications   Prescriptions Last Dose Informant Patient Reported? Taking?   ACCU-CHEK FABIANO PLUS test strip  Spouse/Significant Other Yes No   Si (four) times a day   ACCU-CHEK FASTCLIX LANCETS MISC  Spouse/Significant Other Yes No   Si (four) times a day   Azelastine HCl 137 MCG/SPRAY SOLN  Spouse/Significant Other Yes No   Sig: Every 12 hours   Patient not taking: Reported on 2024   Cholecalciferol 125 MCG (5000 UT) capsule  Spouse/Significant Other Yes No   Sig: every 24 hours   Patient " not taking: Reported on 2024   GNP Aspirin Low Dose 81 MG EC tablet  Spouse/Significant Other Yes No   Sig: Take 1 tablet (81 mg total) by mouth daily Do not start before 2023.   Jardiance 10 MG TABS  Spouse/Significant Other Yes No   Sig: Take 10 mg by mouth every morning   NIFEdipine (PROCARDIA XL) 30 mg 24 hr tablet  Spouse/Significant Other Yes No   Sig: TAKE 1 TABLET BY MOUTH EVERY DAY IN ADDITION TO 90MG FOR A TOTAL OF 120MG EVERY DAY   NIFEdipine (PROCARDIA XL) 90 mg 24 hr tablet  Spouse/Significant Other Yes No   Sig: Take 120 mg by mouth every morning Takes 90 mg and 30 mg =120 mg every AM   Vancomycin HCl 10 g SOLR  Spouse/Significant Other Yes No   Patient not taking: Reported on 2024   acetaminophen (TYLENOL) 500 mg tablet  Spouse/Significant Other No No   Sig: Take 2 tablets (1,000 mg total) by mouth every 8 (eight) hours 500 mg tablet   ceFEPime (MAXIPIME) 2 g injection  Spouse/Significant Other Yes No   celecoxib (CeleBREX) 100 mg capsule  Spouse/Significant Other No No   Sig: Take 1 capsule (100 mg total) by mouth 2 (two) times a day Take with food   clonazePAM (KlonoPIN) 1 mg tablet  Spouse/Significant Other Yes No   Sig: Take 1 mg by mouth 2 (two) times a day & 3rd pill PRN   finasteride (PROSCAR) 5 mg tablet  Spouse/Significant Other No No   Sig: Take 1 tablet (5 mg total) by mouth daily   fluticasone (FLONASE) 50 mcg/act nasal spray  Spouse/Significant Other Yes No   Si spray into each nostril daily as needed   Patient not taking: Reported on 2024   folic acid (FOLVITE) 1 mg tablet  Spouse/Significant Other Yes No   Sig: Take 2,000 mcg by mouth daily   gabapentin (NEURONTIN) 400 mg capsule  Spouse/Significant Other No No   Sig: Take 1 capsule (400 mg total) by mouth 2 (two) times a day   hydrOXYzine pamoate (VISTARIL) 50 mg capsule  Spouse/Significant Other Yes No   Sig: Take 50 mg by mouth 2 (two) times a day. Indications: Feeling Anxious   ketoconazole (NIZORAL) 2 %  shampoo  Spouse/Significant Other Yes No   Sig: Apply 1 application. topically daily Use as directed   lidocaine (LIDODERM) 5 %  Spouse/Significant Other No No   Sig: Apply 1 patch topically over 12 hours daily Remove & Discard patch within 12 hours or as directed by MD   loratadine (CLARITIN) 10 mg tablet  Spouse/Significant Other Yes No   Sig: Take 10 mg by mouth daily. per med list humberto    Indications: Hayfever   losartan-hydrochlorothiazide (HYZAAR) 100-25 MG per tablet  Spouse/Significant Other Yes No   Sig: Take 1 tablet by mouth every morning   lovastatin (MEVACOR) 20 mg tablet  Spouse/Significant Other Yes No   Sig: Take 20 mg by mouth every morning   metFORMIN (GLUCOPHAGE) 1000 MG tablet  Spouse/Significant Other Yes No   Sig: Take 1,000 mg by mouth 2 (two) times a day with meals    methocarbamol (ROBAXIN) 750 mg tablet  Spouse/Significant Other No No   Sig: Take 1 tablet (750 mg total) by mouth every 6 (six) hours as needed for muscle spasms   metoprolol succinate (TOPROL-XL) 50 mg 24 hr tablet  Spouse/Significant Other Yes No   Sig: TAKE 1 AND 1/2 TABLET BY MOUTH ONCE DAILY   mirtazapine (REMERON) 45 MG tablet  Spouse/Significant Other Yes No   Sig: Take 45 mg by mouth daily at bedtime   ondansetron (Zofran ODT) 4 mg disintegrating tablet  Spouse/Significant Other No No   Sig: Take 1 tablet (4 mg total) by mouth every 6 (six) hours as needed for nausea or vomiting   patient supplied medication  Spouse/Significant Other Yes No   Sig: medical marijuana as needed per latest dci  Indications: .   senna (SENOKOT) 8.6 mg  Spouse/Significant Other No No   Sig: Take 1 tablet (8.6 mg total) by mouth 2 (two) times a day 2 tabs at bedtime as needed for constipation per latest dci   sertraline (ZOLOFT) 100 mg tablet  Spouse/Significant Other Yes No   Sig: Take 100 mg by mouth every morning   tamsulosin (FLOMAX) 0.4 mg  Spouse/Significant Other No No   Sig: Take 1 capsule (0.4 mg total) by mouth daily with  dinner      Facility-Administered Medications: None       Past Medical History:   Diagnosis Date    Anxiety     Arthritis     Cancer (HCC)     Chronic back pain     Depression     Diabetes mellitus (HCC)     Hypertension     Liver disease     Mitral valve prolapse     Peripheral neuropathy     Neuropathy    PONV (postoperative nausea and vomiting)     Thyroid disease        Past Surgical History:   Procedure Laterality Date    COLONOSCOPY      INCISION AND DRAINAGE OF WOUND Left 2022    Procedure: INCISION AND DRAINAGE (I&D) EXTREMITY;  Surgeon: Moustapha Cote DPM;  Location:  MAIN OR;  Service: Podiatry    IR BIOPSY SPINE  2024    IR BIOPSY SPINE  2024    IR PICC PLACEMENT SINGLE LUMEN  2024    JOINT REPLACEMENT Left 2022    Left TSA    [place] ARTHRODESIS POSTERIOR/PSTLAT TQ 1NTRSPC LUMBAR Bilateral 2023    Procedure: L1-S1 navigated posterior decompression with instrumented fixation fusion;  Surgeon: James Moraes MD;  Location:  MAIN OR;  Service: Neurosurgery    THYROID SURGERY      remove cancer       Family History   Problem Relation Age of Onset    No Known Problems Father     No Known Problems Mother     Colon cancer Neg Hx      I have reviewed and agree with the history as documented.    E-Cigarette/Vaping    E-Cigarette Use Current Some Day User     Comments medical marijuana - occ      E-Cigarette/Vaping Substances    Nicotine No     THC Yes     CBD Yes     Flavoring No     Other No     Unknown No      Social History     Tobacco Use    Smoking status: Some Days     Current packs/day: 0.00     Average packs/day: 0.3 packs/day for 5.9 years (1.5 ttl pk-yrs)     Types: Cigarettes     Start date: 1975     Last attempt to quit: 1981     Years since quittin.1    Smokeless tobacco: Never    Tobacco comments:     quit ; smokes when in pain as of 26   Vaping Use    Vaping status: Some Days    Substances: THC, CBD   Substance Use Topics    Alcohol use: Yes     Drug use: Yes     Frequency: 7.0 times per week     Types: Marijuana     Comment: Medical Marijuana, takes for pain, daily, vape       Review of Systems   Constitutional: Negative.    HENT: Negative.     Eyes: Negative.    Respiratory: Negative.     Cardiovascular: Negative.    Gastrointestinal: Negative.    Endocrine: Negative.    Genitourinary: Negative.    Musculoskeletal:  Positive for back pain.   Skin: Negative.    Allergic/Immunologic: Negative.    Neurological: Negative.    Hematological: Negative.    Psychiatric/Behavioral: Negative.     All other systems reviewed and are negative.      Physical Exam  Physical Exam  Musculoskeletal:      Cervical back: Normal.      Thoracic back: Normal.      Lumbar back: Tenderness present. No swelling, deformity, lacerations or spasms. Negative right straight leg raise test and negative left straight leg raise test.        Back:    Neurological:      General: No focal deficit present.      Mental Status: He is alert and oriented to person, place, and time.      GCS: GCS eye subscore is 4. GCS verbal subscore is 5. GCS motor subscore is 6.      Comments: 5/5 upper extremity strength, no numbness or tingling   5/5 lower extremity strength, no numbness or tingling          Vital Signs  ED Triage Vitals   Temperature Pulse Respirations Blood Pressure SpO2   06/27/24 2005 06/27/24 2002 06/27/24 2002 06/27/24 2002 06/27/24 2002   97.7 °F (36.5 °C) 94 20 (!) 185/87 100 %      Temp Source Heart Rate Source Patient Position - Orthostatic VS BP Location FiO2 (%)   06/27/24 2005 06/27/24 2002 06/27/24 2002 06/27/24 2002 --   Oral Monitor Sitting Left arm       Pain Score       06/27/24 2002       9           Vitals:    06/27/24 2002 06/27/24 2100 06/27/24 2230   BP: (!) 185/87 170/80 (!) 171/65   Pulse: 94 89 90   Patient Position - Orthostatic VS: Sitting           Visual Acuity      ED Medications  Medications   vancomycin (VANCOCIN) IVPB (premix in dextrose) 1,000 mg 200 mL  (1,000 mg Intravenous New Bag 6/28/24 0008)   vancomycin (VANCOCIN) IVPB (premix in dextrose) 1,000 mg 200 mL (has no administration in time range)   HYDROmorphone (DILAUDID) injection 0.5 mg (0.5 mg Intravenous Given 6/27/24 2056)   iohexol (OMNIPAQUE) 350 MG/ML injection (SINGLE-DOSE) 100 mL (100 mL Intravenous Given 6/27/24 2112)   cefepime (MAXIPIME) IVPB (premix in dextrose) 2,000 mg 50 mL (0 mg Intravenous Stopped 6/27/24 2201)   sodium chloride 0.9 % bolus 1,000 mL (1,000 mL Intravenous New Bag 6/27/24 2230)   morphine injection 8 mg (8 mg Intravenous Given 6/27/24 2247)   Gadobutrol injection (SINGLE-DOSE) SOLN 8 mL (8 mL Intravenous Given 6/27/24 2337)       Diagnostic Studies  Results Reviewed       Procedure Component Value Units Date/Time    Lactic acid 2 Hours [[id number]] Collected: 06/28/24 0007    Lab Status: In process Specimen: Blood from Arm, Right Updated: 06/28/24 0014    Vancomycin, random [[id number]]  (Normal) Collected: 06/27/24 2245    Lab Status: Final result Specimen: Blood from Arm, Left Updated: 06/27/24 2332     Vancomycin Rm 10.8 ug/mL     RBC Morphology Reflex Test [[id number]] Collected: 06/27/24 2042    Lab Status: Final result Specimen: Blood from Arm, Left Updated: 06/27/24 2201    Urine Microscopic [[id number]]  (Normal) Collected: 06/27/24 2053    Lab Status: Final result Specimen: Urine, Other Updated: 06/27/24 2157     RBC, UA 1-2 /hpf      WBC, UA 0-1 /hpf      Epithelial Cells None Seen /hpf      Bacteria, UA Occasional /hpf     Narrative:      Microscopic performed by Opal Byrne.     CBC and differential [[id number]]  (Abnormal) Collected: 06/27/24 2042    Lab Status: Final result Specimen: Blood from Arm, Left Updated: 06/27/24 2154     WBC 11.45 Thousand/uL      RBC 5.02 Million/uL      Hemoglobin 12.1 g/dL      Hematocrit 39.9 %      MCV 80 fL      MCH 24.1 pg      MCHC 30.3 g/dL      RDW 18.1 %      MPV 10.2 fL      Platelets 375 Thousands/uL     Narrative:      This is  an appended report.  These results have been appended to a previously verified report.    Manual Differential(PHLEBS Do Not Order) [[id number]]  (Abnormal) Collected: 06/27/24 2042    Lab Status: Final result Specimen: Blood from Arm, Left Updated: 06/27/24 2154     Segmented % 89 %      Lymphocytes % 8 %      Monocytes % 3 %      Eosinophils % 0 %      Basophils % 0 %      Absolute Neutrophils 10.19 Thousand/uL      Absolute Lymphocytes 0.92 Thousand/uL      Absolute Monocytes 0.34 Thousand/uL      Absolute Eosinophils 0.00 Thousand/uL      Absolute Basophils 0.00 Thousand/uL      Total Counted --     RBC Morphology Present     Platelet Estimate Adequate     Hypochromia Present     Microcytes Present     Ovalocytes Present     Poikilocytes Present     Tear Drop Cells Present    Procalcitonin [[id number]]  (Normal) Collected: 06/27/24 2042    Lab Status: Final result Specimen: Blood from Arm, Left Updated: 06/27/24 2140     Procalcitonin 0.09 ng/ml     Comprehensive metabolic panel [[id number]]  (Abnormal) Collected: 06/27/24 2042    Lab Status: Final result Specimen: Blood from Arm, Left Updated: 06/27/24 2129     Sodium 133 mmol/L      Potassium 3.9 mmol/L      Chloride 91 mmol/L      CO2 29 mmol/L      ANION GAP 13 mmol/L      BUN 13 mg/dL      Creatinine 0.83 mg/dL      Glucose 234 mg/dL      Calcium 10.0 mg/dL      AST 16 U/L      ALT 10 U/L      Alkaline Phosphatase 79 U/L      Total Protein 8.0 g/dL      Albumin 4.5 g/dL      Total Bilirubin 0.39 mg/dL      eGFR 91 ml/min/1.73sq m     Narrative:      National Kid[employer] Disease Foundation guidelines for Chronic Kidney Disease (CKD):     Stage 1 with normal or high GFR (GFR > 90 mL/min/1.73 square meters)    Stage 2 Mild CKD (GFR = 60-89 mL/min/1.73 square meters)    Stage 3A Moderate CKD (GFR = 45-59 mL/min/1.73 square meters)    Stage 3B Moderate CKD (GFR = 30-44 mL/min/1.73 square meters)    Stage 4 Severe CKD (GFR = 15-29 mL/min/1.73 square meters)    Stage 5 End  Stage CKD (GFR <15 mL/min/1.73 square meters)  Note: GFR calculation is accurate only with a steady state creatinine    Lactic acid [[id number]]  (Abnormal) Collected: 06/27/24 2042    Lab Status: Final result Specimen: Blood from Arm, Left Updated: 06/27/24 2128     LACTIC ACID 2.5 mmol/L     Narrative:      Result may be elevated if tourniquet was used during collection.    Protime-INR [[id number]]  (Normal) Collected: 06/27/24 2042    Lab Status: Final result Specimen: Blood from Arm, Left Updated: 06/27/24 2122     Protime 13.2 seconds      INR 0.97    APTT [[id number]]  (Normal) Collected: 06/27/24 2042    Lab Status: Final result Specimen: Blood from Arm, Left Updated: 06/27/24 2122     PTT 30 seconds     UA w Reflex to Microscopic w Reflex to Culture [[id number]]  (Abnormal) Collected: 06/27/24 2053    Lab Status: Final result Specimen: Urine, Other Updated: 06/27/24 2105     Color, UA Light Yellow     Clarity, UA Clear     Specific Gravity, UA 1.015     pH, UA 8.0     Leukocytes, UA Negative     Nitrite, UA Negative     Protein,  (2+) mg/dl      Glucose, UA 1000 (1%) mg/dl      Ketones, UA 10 (1+) mg/dl      Urobilinogen, UA <2.0 mg/dl      Bilirubin, UA Negative     Occult Blood, UA Negative    Blood culture #1 [[id number]] Collected: 06/27/24 2042    Lab Status: In process Specimen: Blood from Arm, Left Updated: 06/27/24 2103    Blood culture #2 [[id number]] Collected: 06/27/24 2042    Lab Status: In process Specimen: Blood from Hand, Left Updated: 06/27/24 2103                   CT abdomen pelvis with contrast   Final Result by Willian Mina DO (06/27 2246)   Limited evaluation of the spine with redemonstrated findings as detailed above. CT is overall limited for evaluation of the spine especially for evaluating aforementioned epidural collections. Consider further evaluation with contrast-enhanced MRI of the    lumbar spine.         Workstation performed: [id number]         CT recon only lumbar spine (No  Charge)   Final Result by Willian Mina DO (06/27 2246)   Limited evaluation of the spine with redemonstrated findings as detailed above. CT is overall limited for evaluation of the spine especially for evaluating aforementioned epidural collections. Consider further evaluation with contrast-enhanced MRI of the    lumbar spine.         Workstation performed: [id number]         XR chest pa & lateral    (Results Pending)   MRI lumbar spine w wo contrast    (Results Pending)              Procedures  Procedures         ED Course                               SBIRT 20yo+      Flowsheet Row Most Recent Value   Initial Alcohol Screen: US AUDIT-C     1. How often do you have a drink containing alcohol? 0 Filed at: 06/27/2024 2004   2. How many drinks containing alcohol do you have on a typical day you are drinking?  0 Filed at: 06/27/2024 2004   3b. FEMALE Any Age, or MALE 65+: How often do you have 4 or more drinks on one occassion? 0 Filed at: 06/27/2024 2004   Audit-C Score 0 Filed at: 06/27/2024 2004   JARON: How many times in the past year have you...    Used an illegal drug or used a prescription medication for non-medical reasons? Never Filed at: 06/27/2024 2004                      Medical Decision Making  Differential diagnosis including discitis, postop abscess  Patient presenting with worsening lower back pain that radiates down his right leg.  Patient states that the radiation down the right leg is new however has no symptoms of saddle anesthesia, perineal paresthesia, urinary/fecal incontinence or retention.  Patient endorses is a subjective fever a few nights ago.  Given worsening pain, there is concern for post op abscess. Will treat pain and obtain septic work up. Patient states he is unable to tolerate p.o. at this time because of the pain and has nausea and vomiting.  He has no chest pain or shortness of breath.  Patient states he feels weaker when he tries to walk with his walker.   Patient is currently  being treated via a PICC with 1 g of vancomycin twice daily.  With plans to change to doxycycline in August.  Blood work shows a leukocytosis, and a lactic acidosis.  Patient was provided with cefepime, and will provide vancomycin based on trough level from recommendation of pharmacy.   Case was discussed with [facility] Dr Antwon Capone, and in agreement with workup so far, with recommendation for MRI to be obtained with and without contrast while awaiting transfer. Dr Antwon Capone accepts the patient to [facility] as an urgent basis, which per their definition can take up to 24 hours.  Recommendation for admit to obs at this facility. MRI is able to be obtained tonight.   Dr Antwon Capone phone number directly is [phone].   Case was discussed with Dr. Parham, aware patient is planned for transfer to [facility] with disposition to admit to obs at this facility at transfer could take up to 24 hours, or if MRI results are grossly abnormal will reach back out to [facility] to discuss changing status of urgency.     Amount and/or Complexity of Data Reviewed  Labs: ordered.  Radiology: ordered.    Risk  Prescription drug management.             Disposition  Final diagnoses:   Failure of joint fusion, subsequent encounter     Time reflects when diagnosis was documented in both MDM as applicable and the Disposition within this note       Time User Action Codes Description Comment    6/27/2024 11:41 PM Bi Perez Add [T84.9XXD] Failure of joint fusion, subsequent encounter           ED Disposition       None          Follow-up Information    None         Patient's Medications   Discharge Prescriptions    No medications on file       No discharge procedures on file.    PDMP Review         Value Time User    PDMP Reviewed  Yes 2/6/2024 12:43 PM Sintia Soto PA-C            ED Provider  Electronically Signed by             RUIZ Yang  06/28/24 0031

## 2024-06-28 NOTE — CASE MANAGEMENT
Case Management Discharge Planning Note    Patient name Juan San  Location /-01 MRN [id number]  : 1955 Date 2024       Current Admission Date: 2024  Current Admission Diagnosis:Discitis of lumbosacral region   Patient Active Problem List    Diagnosis Date Noted Date Diagnosed    Failure of joint fusion ([facility]) 2024     Sepsis without acute organ dysfunction ([facility]) 2024     Lactic acidosis 2024     Type 2 diabetes mellitus with hyperglycemia, without long-term current use of insulin ([facility]) 2024     Foraminal stenosis of lumbar region 2024     Discitis of lumbosacral region 2024     Hypochromic microcytic anemia 10/10/2023     Acute on chronic bilateral low back pain with sciatica 10/09/2023     Anxiety and depression 10/09/2023     Hypertension 10/09/2023     Benign prostatic hyperplasia with urinary retention 2023     Scrotal pain 2023     Lower urinary tract symptoms 2023     Status post lumbar spinal fusion 2023     Hyponatremia 2023     Gallstones 2023     Anemia 2023     Urinary retention 2023     PONV (postoperative nausea and vomiting)      Medical marijuana use      Chronic bilateral low back pain without sciatica 2023     Lumbar radiculopathy      Chronic pain syndrome 2022     Liver disease 08/10/2022     Bronchitis, mucopurulent recurrent (HCC) 2022     Cirrhosis, alcoholic (HCC) 2022     Diabetic peripheral neuropathy (HCC) 2022     Herniated nucleus pulposus of lumbosacral region 2022     Thyroid cancer (HCC) 2021     Alcoholism in remission (HCC) 2021     Benzodiazepine dependence (HCC) 2021     Bilateral adhesive capsulitis of shoulders 2021     DM (diabetes mellitus) (HCC) 2021     Hypercholesterolemia 2021     Lumbar spondylosis 2021       LOS (days): 0  Geometric Mean LOS (GMLOS) (days):   Days to GMLOS:      OBJECTIVE:            Current admission status: Observation   Preferred Pharmacy:   [facility] [address] [address]  Phone: [phone] Fax: [phone]    Primary Care Provider: Sabra Magaña DO    Primary Insurance: [employer]  Secondary Insurance: [facility]    DISCHARGE DETAILS:           Notification made to OP CM Handoff: [facility] OP CM regarding discharge planning and disposition.     CM left message for Yoly at [facility] Office.

## 2024-06-28 NOTE — PLAN OF CARE
Problem: Potential for Falls  Goal: Patient will remain free of falls  Description: INTERVENTIONS:  - Educate patient/family on patient safety including physical limitations  - Instruct patient to call for assistance with activity   - Consult OT/PT to assist with strengthening/mobility   - Keep Call bell within reach  - Keep bed low and locked with side rails adjusted as appropriate  - Keep care items and personal belongings within reach  - Initiate and maintain comfort rounds  - Make Fall Risk Sign visible to staff  - Apply yellow socks and bracelet for high fall risk patients  - Consider moving patient to room near nurses station  Outcome: Progressing     Problem: PAIN - ADULT  Goal: Verbalizes/displays adequate comfort level or baseline comfort level  Description: Interventions:  - Encourage patient to monitor pain and request assistance  - Assess pain using appropriate pain scale  - Administer analgesics based on type and severity of pain and evaluate response  - Implement non-pharmacological measures as appropriate and evaluate response  - Consider cultural and social influences on pain and pain management  - Notify physician/advanced practitioner if interventions unsuccessful or patient reports new pain  Outcome: Progressing     Problem: INFECTION - ADULT  Goal: Absence or prevention of progression during hospitalization  Description: INTERVENTIONS:  - Assess and monitor for signs and symptoms of infection  - Monitor lab/diagnostic results  - Monitor all insertion sites, i.e. indwelling lines, tubes, and drains  - Monitor endotracheal if appropriate and nasal secretions for changes in amount and color  - [place] appropriate cooling/warming therapies per order  - Administer medications as ordered  - Instruct and encourage patient and family to use good hand hygiene technique  - Identify and instruct in appropriate isolation precautions for identified infection/condition  Outcome: Progressing  Goal: Absence  of fever/infection during neutropenic period  Description: INTERVENTIONS:  - Monitor WBC    Outcome: Progressing

## 2024-06-28 NOTE — CASE MANAGEMENT
Case Management Discharge Planning Note    Patient name Juan San  Location /-01 MRN [id number]  : 1955 Date 2024       Current Admission Date: 2024  Current Admission Diagnosis:Discitis of lumbosacral region   Patient Active Problem List    Diagnosis Date Noted Date Diagnosed    Failure of joint fusion ([facility]) 2024     Sepsis without acute organ dysfunction ([facility]) 2024     Lactic acidosis 2024     Type 2 diabetes mellitus with hyperglycemia, without long-term current use of insulin ([facility]) 2024     Foraminal stenosis of lumbar region 2024     Discitis of lumbosacral region 2024     Hypochromic microcytic anemia 10/10/2023     Acute on chronic bilateral low back pain with sciatica 10/09/2023     Anxiety and depression 10/09/2023     Hypertension 10/09/2023     Benign prostatic hyperplasia with urinary retention 2023     Scrotal pain 2023     Lower urinary tract symptoms 2023     Status post lumbar spinal fusion 2023     Hyponatremia 2023     Gallstones 2023     Anemia 2023     Urinary retention 2023     PONV (postoperative nausea and vomiting)      Medical marijuana use      Chronic bilateral low back pain without sciatica 2023     Lumbar radiculopathy      Chronic pain syndrome 2022     Liver disease 08/10/2022     Bronchitis, mucopurulent recurrent (HCC) 2022     Cirrhosis, alcoholic (HCC) 2022     Diabetic peripheral neuropathy (HCC) 2022     Herniated nucleus pulposus of lumbosacral region 2022     Thyroid cancer (HCC) 2021     Alcoholism in remission (HCC) 2021     Benzodiazepine dependence (HCC) 2021     Bilateral adhesive capsulitis of shoulders 2021     DM (diabetes mellitus) (HCC) 2021     Hypercholesterolemia 2021     Lumbar spondylosis 2021       LOS (days): 0  Geometric Mean LOS (GMLOS) (days):   Days to GMLOS:      OBJECTIVE:            Current admission status: Observation   Preferred Pharmacy:   [facility] [address] [address]  Phone: [phone] Fax: [phone]    Primary Care Provider: Sabra Magaña DO    Primary Insurance: [employer] RE[employer]  Secondary Insurance: [facility]    DISCHARGE DETAILS:        Pt is being transferred to [facility] for Persistent L5-S1 discitis, concerning for residual infection. Severe L5-S1 central canal stenosis and neural foraminal narrowing. CM completed medical necessity for transport and placed it on chart.

## 2024-06-28 NOTE — ASSESSMENT & PLAN NOTE
Status post partial thyroidectomy  Not maintained on Synthroid as levels have been stable  Follows with [facility], remains in remission

## 2024-06-28 NOTE — ASSESSMENT & PLAN NOTE
Lab Results   Component Value Date    HGBA1C 7.9 (H) 05/19/2024       Recent Labs     06/28/24  0710 06/28/24  1203   POCGLU 195* 284*       Blood Sugar Average: Last 72 hrs:  (P) 239.5Home regimen: Metformin 1000 mg twice daily and Jardiance 10 Mg daily  Hold oral medications and placed on SSI AC/at bedtime while inpatient

## 2024-06-28 NOTE — ED NOTES
Pt has only received about 300 mL of Sodium Chloride since fluids were stopped for MRI.     Dorcas Kerr RN  06/28/24 0026

## 2024-06-28 NOTE — DISCHARGE SUMMARY
Formerly [facility]  Discharge- Juan San 1955, 68 y.o. male MRN: [id number]  Unit/Bed#: -[facility] Encounter: [id number]  Primary Care Provider: Sabra Magaña DO   Date and time admitted to hospital: 6/27/2024  8:05 PM    Type 2 diabetes mellitus with hyperglycemia, without long-term current use of insulin (HCC)  Assessment & Plan  Lab Results   Component Value Date    HGBA1C 7.9 (H) 05/19/2024       Recent Labs     06/28/24  0710 06/28/24  1203   POCGLU 195* 284*       Blood Sugar Average: Last 72 hrs:  (P) 239.5Home regimen: Metformin 1000 mg twice daily and Jardiance 10 Mg daily  Hold oral medications and placed on SSI AC/at bedtime while inpatient      Sepsis without acute organ dysfunction (HCC)  Assessment & Plan  SIRS criteria: Tachycardia and tachypnea, also with leukocytosis  Possible discitis/fluid collection of epidural space noted on MRI L-spine  Patient does have lactic acidosis, however takes metformin for diabetes and admits to decreased oral intake-suspect that this is lactic acidosis secondary to this not to endorgan dysfunction  Continue vancomycin and cefepime  Follow-up blood cultures  Plan to transfer to [facility]    Hypertension  Assessment & Plan  Continue home HCTZ 25 Mg daily, losartan 100 Mg daily, Toprol 75 Mg daily, nifedipine 120 Mg daily    Benign prostatic hyperplasia with urinary retention  Assessment & Plan  Continue home finasteride and Flomax  History of urinary retention with severe pain-placed on urinary retention protocol    Thyroid cancer (HCC)  Assessment & Plan  Status post partial thyroidectomy  Not maintained on Synthroid as levels have been stable  Follows with [facility], remains in remission    Cirrhosis, alcoholic (HCC)  Assessment & Plan  No transaminitis, and T. bili and alk phos are normal    * Discitis of lumbosacral region  Assessment & Plan  Presents with worsening lower back pain radiating to bilateral LE (L> R)  Extensive  "history of lumbar fusion in April 2023 complicated by discitis in January 2024 requiring IV antibiotics-completed IV vancomycin and cefepime outpatient 3/8/2024  Was evaluated by [facility] neurosurgery team for second opinion due to ongoing pain on 6/11 and was taken to the OR for washout on 6/14 and started on IV vancomycin via PICC line x 8 weeks through 8/9 per Xu [place], ID  MRI lumbar spine obtained in the ED: \"Persistent L5-S1 discitis, concerning for residual infection. Severe L5-S1 central canal stenosis and neural foraminal narrowing. Fluid collection along the left lateral aspect of the L5-S1 disc and epidural space measuring 3.5 x 1.2 x 1.3 cm.\"  Patient accepted in transfer to [facility], however pending bed availability  Per ED, neurosurgery at [facility] felt that lack of enhancement of collection was reassuring-recommends continuing IV antibiotics and transfer once bed available  Dr Antwon Capone (neurosurgery) phone number directly is [phone]   Pain control with oxycodone 2.5/5 mg as needed, scheduled Tylenol, Robaxin, and IV Dilaudid for breakthrough pain     Hospital Course:     Juan San is a 68 y.o. male patient who originally presented to the hospital on   Admission Orders (From admission, onward)       Ordered        06/28/24 0226  Place in Observation  Once                         due to worsening back pain in the setting of discitis of the lumbosacral region.  Patient has extensive spinal history including history of discitis, requiring procedures and long-term antibiotics.  Patient was recently treated at [facility] and being transferred there for further evaluation.    Please see above list of diagnoses and related plan for additional information.     Physical Exam:    GEN: No acute distress, comfortable  HEEENT: No JVD, PERRLA, no scleral icterus  RESP: Lungs clear to auscultation bilaterally  CV: RRR, +s1/s2   ABD: SOFT NON TENDER, POSITIVE BOWEL SOUNDS, NO DISTENTION  PSYCH: CALM  NEURO: " Mentation baseline, NO FOCAL DEFICITS  SKIN: NO RASH  EXTREM: NO EDEMA    CONSULTING PROVIDERS   IP CONSULT TO PHARMACY    PROCEDURES PERFORMED  * No surgery found *    RADIOLOGY RESULTS  MRI lumbar spine w wo contrast    Result Date: 6/28/2024  Narrative: MRI LUMBAR SPINE WITH AND WITHOUT CONTRAST INDICATION: Back pain. COMPARISON: 5/19/2024. TECHNIQUE:  Multiplanar, multisequence imaging of the lumbar spine was performed before and after gadolinium administration. . IV Contrast:  8 mL of Gadobutrol injection (SINGLE-DOSE) IMAGE QUALITY:  Diagnostic FINDINGS: VERTEBRAL BODIES: Normal alignment. Visualization due to extensive susceptibility artifact from the lumbar fusion hardware SACRUM: Limited visualization. DISTAL CORD AND CONUS: Normal visualized. PARASPINAL SOFT TISSUES:  Paraspinal soft tissues are unremarkable. LOWER THORACIC DISC SPACES: Limited visualization. Disc and plate degenerative change. No significant central canal stenosis. LUMBAR DISC SPACES: L1-L2: Posterior disc bulge and disc osteophytes. No central canal stenosis. Neural foramina not well visualized. L2-L3: Posterior disc osteophytes. No central canal stenosis. Neural foramina not well visualized. L3-L4: Mild posterior disc bulge and disc osteophytes. No central canal stenosis. No neuroforaminal narrowing. L4-L5: Posterior disc bulge and disc osteophytes. No central canal stenosis. Limited visualization of the neuroforamina without significant narrowing. L5-S1: T2 hyperintensity throughout the disc. Posterior disc bulge and disc osteophytes. Facet osteophytosis. Severe central canal stenosis and neuroforaminal narrowing. POSTCONTRAST IMAGING: Fluid collection along the left lateral aspect of the L5-S1 disc, neural foramina and dorsal epidural space measuring 3.5 x 1.2 x 1.3 cm. Granulation tissue in the posterior paraspinal soft tissues at L5-S1 OTHER FINDINGS:  None.     Impression: 1. Persistent L5-S1 discitis, concerning for residual  infection. Severe L5-S1 central canal stenosis and neural foraminal narrowing 2. Fluid collection along the left lateral aspect of the L5-S1 disc and epidural space measuring 3.5 x 1.2 x 1.3 cm. Recommend neurosurgical consultation. The study was marked in EPIC for immediate notification. Workstation performed: [id number]     CT abdomen pelvis with contrast    Result Date: 6/27/2024  Narrative: CT ABDOMEN AND PELVIS WITH IV CONTRAST INDICATION: back pain. COMPARISON: MRI lumbar spine dated 5/19/2024, CT lumbar spine dated 5/18/2024 TECHNIQUE: CT examination of the abdomen and pelvis was performed. Multiplanar 2D reformatted images were created from the source data. This examination, like all CT scans performed in the [facility], was performed utilizing techniques to minimize radiation dose exposure, including the use of iterative reconstruction and automated exposure control. Radiation dose length product (DLP) for this visit: 711.11 mGy-cm  (accession [id number]), 0 mGy-cm  (accession [id number]) IV Contrast: 100 mL of iohexol (OMNIPAQUE) Enteric Contrast: Not administered. FINDINGS: ABDOMEN LOWER CHEST: No clinically significant abnormality in the visualized lower chest. LIVER/BILIARY TREE: Unremarkable. GALLBLADDER: Cholelithiasis. SPLEEN: Unremarkable. PANCREAS: Unremarkable. ADRENAL GLANDS: Unremarkable. KIDNEYS/URETERS: No hydronephrosis or urinary tract calculi. Subcentimeter hypoattenuating renal lesion(s), too small to characterize but statistically likely benign, which do not warrant follow-up (Radiology June 2019). STOMACH AND BOWEL: Unremarkable. APPENDIX: No findings to suggest appendicitis. ABDOMINOPELVIC CAVITY: No ascites. No pneumoperitoneum. No lymphadenopathy. VESSELS: Atherosclerosis without abdominal aortic aneurysm. PELVIS REPRODUCTIVE ORGANS: Prostatomegaly again noted. URINARY BLADDER: Unremarkable. ABDOMINAL WALL/INGUINAL REGIONS: Unremarkable. BONES: Limited evaluation of the  spine secondary to streak artifact from postoperative posterior fusion from T12-S1. There is again noted to be loosening of the bilateral pedicle screws at S1. Redemonstrated findings of prior discitis/osteomyelitis at L1-L2 and L5-S1. Similar-appearing lucencies scattered throughout the bilateral iliac bone.     Impression: Limited evaluation of the spine with redemonstrated findings as detailed above. CT is overall limited for evaluation of the spine especially for evaluating aforementioned epidural collections. Consider further evaluation with contrast-enhanced MRI of the  lumbar spine. Workstation performed: [id number]     CT recon only lumbar spine (No Charge)    Result Date: 6/27/2024  Narrative: CT ABDOMEN AND PELVIS WITH IV CONTRAST INDICATION: back pain. COMPARISON: MRI lumbar spine dated 5/19/2024, CT lumbar spine dated 5/18/2024 TECHNIQUE: CT examination of the abdomen and pelvis was performed. Multiplanar 2D reformatted images were created from the source data. This examination, like all CT scans performed in the [facility], was performed utilizing techniques to minimize radiation dose exposure, including the use of iterative reconstruction and automated exposure control. Radiation dose length product (DLP) for this visit: 711.11 mGy-cm  (accession [id number]), 0 mGy-cm  (accession [id number]) IV Contrast: 100 mL of iohexol (OMNIPAQUE) Enteric Contrast: Not administered. FINDINGS: ABDOMEN LOWER CHEST: No clinically significant abnormality in the visualized lower chest. LIVER/BILIARY TREE: Unremarkable. GALLBLADDER: Cholelithiasis. SPLEEN: Unremarkable. PANCREAS: Unremarkable. ADRENAL GLANDS: Unremarkable. KIDNEYS/URETERS: No hydronephrosis or urinary tract calculi. Subcentimeter hypoattenuating renal lesion(s), too small to characterize but statistically likely benign, which do not warrant follow-up (Radiology June 2019). STOMACH AND BOWEL: Unremarkable. APPENDIX: No findings to suggest  appendicitis. ABDOMINOPELVIC CAVITY: No ascites. No pneumoperitoneum. No lymphadenopathy. VESSELS: Atherosclerosis without abdominal aortic aneurysm. PELVIS REPRODUCTIVE ORGANS: Prostatomegaly again noted. URINARY BLADDER: Unremarkable. ABDOMINAL WALL/INGUINAL REGIONS: Unremarkable. BONES: Limited evaluation of the spine secondary to streak artifact from postoperative posterior fusion from T12-S1. There is again noted to be loosening of the bilateral pedicle screws at S1. Redemonstrated findings of prior discitis/osteomyelitis at L1-L2 and L5-S1. Similar-appearing lucencies scattered throughout the bilateral iliac bone.     Impression: Limited evaluation of the spine with redemonstrated findings as detailed above. CT is overall limited for evaluation of the spine especially for evaluating aforementioned epidural collections. Consider further evaluation with contrast-enhanced MRI of the  lumbar spine. Workstation performed: [id number]     US-OUTSIDE    Result Date: 6/25/2024  Narrative: This result has an attachment that is not available.      LABS  Results from last 7 days   Lab Units 06/28/24  0416 06/27/24 2042   WBC Thousand/uL 13.25* 11.45*   HEMOGLOBIN g/dL 11.4* 12.1   HEMATOCRIT % 36.8 39.9   MCV fL 78* 80*   TOTAL NEUT ABS Thousand/uL  --  10.19*   PLATELETS Thousands/uL 387 375   INR   --  0.97     Results from last 7 days   Lab Units 06/28/24  0416 06/27/24 2042 06/25/24  0920   SODIUM mmol/L 136 133* 139   POTASSIUM mmol/L 3.8 3.9 4.6   CHLORIDE mmol/L 94* 91* 98*   CO2 mmol/L 33* 29 27   BUN mg/dL 14 13 16   CREATININE mg/dL 0.87 0.83 0.74   CALCIUM mg/dL 9.4 10.0 9.3   ALBUMIN g/dL  --  4.5 4.3   TOTAL BILIRUBIN mg/dL  --  0.39 0.3   ALK PHOS U/L  --  79 85   ALT U/L  --  10 11*   AST U/L  --  16 17   EGFR ml/min/1.73sq m 89 91 100   GLUCOSE RANDOM mg/dL 184* 234* 172*                  Results from last 7 days   Lab Units 06/28/24  1203 06/28/24  0710   POC GLUCOSE mg/dl 284* 195*             Results  from last 7 days   Lab Units 06/28/24  0007 06/27/24 2042   LACTIC ACID mmol/L 1.8 2.5*   PROCALCITONIN ng/ml  --  0.09           Cultures:   Results from last 7 days   Lab Units 06/27/24 2053   COLOR UA  Light Yellow   CLARITY UA  Clear   SPEC GRAV UA  1.015   PH UA  8.0   LEUKOCYTES UA  Negative   NITRITE UA  Negative   GLUCOSE UA mg/dl 1000 (1%)*   KETONES UA mg/dl 10 (1+)*   BILIRUBIN UA  Negative   BLOOD UA  Negative      Results from last 7 days   Lab Units 06/27/24 2053   RBC UA /hpf 1-2   WBC UA /hpf 0-1   EPITHELIAL CELLS WET PREP /hpf None Seen   BACTERIA UA /hpf Occasional      Results from last 7 days   Lab Units 06/27/24 2042   BLOOD CULTURE  Received in Microbiology Lab. Culture in Progress.  Received in Microbiology Lab. Culture in Progress.         Condition at Discharge:  acute      Discharge instructions/Information to patient and family:   See after visit summary for information provided to patient and family.  The above hospital course, results, incidental findings, need for follow up was discussed in detail with the patient and/or family.    Provisions for Follow-Up Care:  See after visit summary for information related to follow-up care and any pertinent home health orders.      Disposition:     Other: [facility]       Discharge Statement:  I spent 41 minutes discharging the patient. This time was spent on the day of discharge. I had direct contact with the patient on the day of discharge. Greater than 50% of the total time was spent examining patient, answering all patient questions, arranging and discussing plan of care with patient as well as directly providing post-discharge instructions.  Additional time then spent on discharge activities.    Discharge Medications:  See after visit summary for reconciled discharge medications provided to patient and family.      ** Please Note: This note has been constructed using a voice recognition system **

## 2024-06-28 NOTE — ASSESSMENT & PLAN NOTE
Status post partial thyroidectomy  Not maintained on Synthroid as levels have been stable  Follows with [place], remains in remission

## 2024-06-28 NOTE — ASSESSMENT & PLAN NOTE
SIRS criteria: Tachycardia and tachypnea, also with leukocytosis  Possible discitis/fluid collection of epidural space noted on MRI L-spine  Patient does have lactic acidosis, however takes metformin for diabetes and admits to decreased oral intake-suspect that this is lactic acidosis secondary to this not to endorgan dysfunction  Continue vancomycin and cefepime  Follow-up blood cultures  Plan to transfer to [facility] once bed available

## 2024-06-28 NOTE — ASSESSMENT & PLAN NOTE
"Presents with worsening lower back pain radiating to bilateral LE (L> R)  Extensive history of lumbar fusion in April 2023 complicated by discitis in January 2024 requiring IV antibiotics-completed IV vancomycin and cefepime outpatient 3/8/2024  Was evaluated by [facility] neurosurgery team for second opinion due to ongoing pain on 6/11 and was taken to the OR for washout on 6/14 and started on IV vancomycin via PICC line x 8 weeks through 8/9 per Xu [place], ID  MRI lumbar spine obtained in the ED: \"Persistent L5-S1 discitis, concerning for residual infection. Severe L5-S1 central canal stenosis and neural foraminal narrowing. Fluid collection along the left lateral aspect of the L5-S1 disc and epidural space measuring 3.5 x 1.2 x 1.3 cm.\"  Patient accepted in transfer to [facility], however pending bed availability  Per ED, neurosurgery at [facility] felt that lack of enhancement of collection was reassuring-recommends continuing IV antibiotics and transfer once bed available  Dr Antwon Capone (neurosurgery) phone number directly is [phone]   Pain control with oxycodone 2.5/5 mg as needed, scheduled Tylenol, Robaxin, and IV Dilaudid for breakthrough pain  "

## 2024-06-28 NOTE — TRANSPORTATION MEDICAL NECESSITY
"Section I - General Information    Name of Patient: Juan San                 : 1955    Medicare #: [id number]  Transport Date: 24 (PCS is valid for round trips on this date and for all repetitive trips in the 60-day range as noted below.)  Origin: [facility] MED SURG UNIT                                                         Destination: [facility]   Is the pt's stay covered under Medicare Part A (PPS/DRG)   []     Closest appropriate facility? If no, why is transport to more distant facility required? Yes  If hospice pt, is this transport related to pt's terminal illness? NA       Section II - Medical Necessity Questionnaire  Ambulance transportation is medically necessary only if other means of transport are contraindicated or would be potentially harmful to the patient. To meet this requirement, the patient must either be \"bed confined\" or suffer from a condition such that transport by means other than ambulance is contraindicated by the patient's condition. The following questions must be answered by the medical professional signing below for this form to be valid:    1)  Describe the MEDICAL CONDITION (physical and/or mental) of this patient AT THE TIME OF AMBULANCE TRANSPORT that requires the patient to be transported in an ambulance and why transport by other means is contraindicated by the patient's condition:Persistent L5-S1 discitis, concerning for residual infection. Severe L5-S1 central canal stenosis and neural foraminal narrowing     2) Is the patient \"bed confined\" as defined below?     Yes  To be \"be confined\" the patient must satisfy all three of the following conditions: (1) unable to get up from bed without Assistance; AND (2) unable to ambulate; AND (3) unable to sit in a chair or wheelchair.    3) Can this patient safely be transported by car or wheelchair van (i.e., seated during transport without a medical attendant or monitoring)?   " No    4) In addition to completing questions 1-3 above, please check any of the following conditions that apply*:   *Note: supporting documentation for any boxes checked must be maintained in the patient's medical records.  If hosp-hosp transfer, describe services needed at 2nd facility not available at 1st facility?   Moderate/severe pain on movement   Unable to tolerate seated position for time needed to transport       Section III - Signature of Physician or Healthcare Professional  I certify that the above information is true and correct based on my evaluation of this patient, and represent that the patient requires transport by ambulance and that other forms of transport are contraindicated. I understand that this information will be used by the Centers for Medicare and Medicaid Services (CMS) to support the determination of medical necessity for ambulance services, and I represent that I have personal knowledge of the patient's condition at time of transport.    []  If this box is checked, I also certify that the patient is physically or mentally incapable of signing the ambulance service's claim and that the institution with which I am affiliated has furnished care, services, or assistance to the patient.    My signature below is made on behalf of the patient pursuant to 42 CFR §424.36(b)(4). In accordance with 42 CFR §424.37, the specific reason(s) that the patient is physically or mentally incapable of signing the claim form is as follows: .      Signature of Physician* or Healthcare Professional______________________________________________________________  Signature Date 06/28/24 (For scheduled repetitive transports, this form is not valid for transports performed more than 60 days after this date)    Printed Name & Credentials of Physician or Healthcare Professional (MD, DO, RN, etc.) Lona Bloom RN  *Form must be signed by patient's attending physician for scheduled, repetitive transports. For  non-repetitive, unscheduled ambulance transports, if unable to obtain the signature of the attending physician, any of the following may sign (choose appropriate option below)  [] Physician Assistant []  Clinical Nurse Specialist []  Registered Nurse  []  Nurse Practitioner  [x] Discharge Planner

## 2024-06-28 NOTE — ED NOTES
Pt given 1 mg dilaudid, pt advised that will not help his pain but agreed to try the medication anyway.     Dorcas Kerr RN  06/28/24 0129

## 2024-06-30 LAB
BACTERIA BLD CULT: NORMAL
BACTERIA BLD CULT: NORMAL

## 2024-07-03 LAB
BACTERIA BLD CULT: NORMAL
BACTERIA BLD CULT: NORMAL

## 2024-07-13 ENCOUNTER — HOSPITAL ENCOUNTER (INPATIENT)
Facility: HOSPITAL | Age: 69
LOS: 1 days | Discharge: HOME HEALTH | DRG: 683 | End: 2024-07-15
Attending: EMERGENCY MEDICINE | Admitting: INTERNAL MEDICINE
Payer: COMMERCIAL

## 2024-07-13 ENCOUNTER — APPOINTMENT (EMERGENCY)
Dept: RADIOLOGY | Facility: HOSPITAL | Age: 69
DRG: 683 | End: 2024-07-13
Payer: COMMERCIAL

## 2024-07-13 DIAGNOSIS — L03.90 CELLULITIS: ICD-10-CM

## 2024-07-13 DIAGNOSIS — L97.511 VENOUS STASIS ULCER OF OTHER PART OF RIGHT FOOT LIMITED TO BREAKDOWN OF SKIN, UNSPECIFIED WHETHER VARICOSE VEINS PRESENT (HCC): ICD-10-CM

## 2024-07-13 DIAGNOSIS — I83.015 VENOUS STASIS ULCER OF OTHER PART OF RIGHT FOOT LIMITED TO BREAKDOWN OF SKIN, UNSPECIFIED WHETHER VARICOSE VEINS PRESENT (HCC): ICD-10-CM

## 2024-07-13 DIAGNOSIS — M54.16 LUMBAR RADICULOPATHY: ICD-10-CM

## 2024-07-13 DIAGNOSIS — L97.811 VENOUS STASIS ULCER OF OTHER PART OF RIGHT LOWER LEG LIMITED TO BREAKDOWN OF SKIN WITHOUT VARICOSE VEINS (HCC): ICD-10-CM

## 2024-07-13 DIAGNOSIS — I87.2 VENOUS STASIS ULCER OF OTHER PART OF RIGHT LOWER LEG LIMITED TO BREAKDOWN OF SKIN WITHOUT VARICOSE VEINS (HCC): ICD-10-CM

## 2024-07-13 DIAGNOSIS — M96.1 POSTLAMINECTOMY SYNDROME, LUMBAR: ICD-10-CM

## 2024-07-13 DIAGNOSIS — R60.0 LOCALIZED EDEMA: ICD-10-CM

## 2024-07-13 DIAGNOSIS — L97.909 LEG ULCER (HCC): ICD-10-CM

## 2024-07-13 DIAGNOSIS — R60.0 LOWER EXTREMITY EDEMA: ICD-10-CM

## 2024-07-13 DIAGNOSIS — R60.0 BILATERAL LOWER EXTREMITY EDEMA: Primary | ICD-10-CM

## 2024-07-13 DIAGNOSIS — N41.9 PROSTATITIS: ICD-10-CM

## 2024-07-13 DIAGNOSIS — M46.47 DISCITIS OF LUMBOSACRAL REGION: ICD-10-CM

## 2024-07-13 PROBLEM — N17.9 AKI (ACUTE KIDNEY INJURY) (HCC): Status: ACTIVE | Noted: 2024-07-13

## 2024-07-13 PROBLEM — I83.009 VENOUS STASIS ULCERS (HCC): Status: ACTIVE | Noted: 2024-07-13

## 2024-07-13 LAB
ALBUMIN SERPL BCG-MCNC: 3.7 G/DL (ref 3.5–5)
ALP SERPL-CCNC: 60 U/L (ref 34–104)
ALT SERPL W P-5'-P-CCNC: 7 U/L (ref 7–52)
ANION GAP SERPL CALCULATED.3IONS-SCNC: 6 MMOL/L (ref 4–13)
AST SERPL W P-5'-P-CCNC: 13 U/L (ref 13–39)
BACTERIA UR QL AUTO: ABNORMAL /HPF
BASOPHILS # BLD AUTO: 0.05 THOUSANDS/ÂΜL (ref 0–0.1)
BASOPHILS NFR BLD AUTO: 1 % (ref 0–1)
BILIRUB SERPL-MCNC: 0.25 MG/DL (ref 0.2–1)
BILIRUB UR QL STRIP: NEGATIVE
BNP SERPL-MCNC: 117 PG/ML (ref 0–100)
BUN SERPL-MCNC: 20 MG/DL (ref 5–25)
CALCIUM SERPL-MCNC: 8.6 MG/DL (ref 8.4–10.2)
CHLORIDE SERPL-SCNC: 99 MMOL/L (ref 96–108)
CLARITY UR: CLEAR
CO2 SERPL-SCNC: 27 MMOL/L (ref 21–32)
COLOR UR: YELLOW
CREAT SERPL-MCNC: 1.55 MG/DL (ref 0.6–1.3)
EOSINOPHIL # BLD AUTO: 0.41 THOUSAND/ÂΜL (ref 0–0.61)
EOSINOPHIL NFR BLD AUTO: 4 % (ref 0–6)
ERYTHROCYTE [DISTWIDTH] IN BLOOD BY AUTOMATED COUNT: 19.1 % (ref 11.6–15.1)
FERRITIN SERPL-MCNC: 8 NG/ML (ref 24–336)
GFR SERPL CREATININE-BSD FRML MDRD: 45 ML/MIN/1.73SQ M
GLUCOSE SERPL-MCNC: 132 MG/DL (ref 65–140)
GLUCOSE SERPL-MCNC: 91 MG/DL (ref 65–140)
GLUCOSE UR STRIP-MCNC: ABNORMAL MG/DL
HCT VFR BLD AUTO: 29.6 % (ref 36.5–49.3)
HGB BLD-MCNC: 8.8 G/DL (ref 12–17)
HGB UR QL STRIP.AUTO: NEGATIVE
IMM GRANULOCYTES # BLD AUTO: 0.05 THOUSAND/UL (ref 0–0.2)
IMM GRANULOCYTES NFR BLD AUTO: 1 % (ref 0–2)
IRON SATN MFR SERPL: 15 % (ref 15–50)
IRON SERPL-MCNC: 46 UG/DL (ref 50–212)
KETONES UR STRIP-MCNC: NEGATIVE MG/DL
LEUKOCYTE ESTERASE UR QL STRIP: ABNORMAL
LYMPHOCYTES # BLD AUTO: 1.87 THOUSANDS/ÂΜL (ref 0.6–4.47)
LYMPHOCYTES NFR BLD AUTO: 18 % (ref 14–44)
MCH RBC QN AUTO: 24 PG (ref 26.8–34.3)
MCHC RBC AUTO-ENTMCNC: 29.7 G/DL (ref 31.4–37.4)
MCV RBC AUTO: 81 FL (ref 82–98)
MONOCYTES # BLD AUTO: 0.85 THOUSAND/ÂΜL (ref 0.17–1.22)
MONOCYTES NFR BLD AUTO: 8 % (ref 4–12)
NEUTROPHILS # BLD AUTO: 7.06 THOUSANDS/ÂΜL (ref 1.85–7.62)
NEUTS SEG NFR BLD AUTO: 68 % (ref 43–75)
NITRITE UR QL STRIP: NEGATIVE
NON-SQ EPI CELLS URNS QL MICRO: ABNORMAL /HPF
NRBC BLD AUTO-RTO: 0 /100 WBCS
OTHER STN SPEC: ABNORMAL
PH UR STRIP.AUTO: 5.5 [PH]
PLATELET # BLD AUTO: 282 THOUSANDS/UL (ref 149–390)
PMV BLD AUTO: 9.8 FL (ref 8.9–12.7)
POTASSIUM SERPL-SCNC: 3.9 MMOL/L (ref 3.5–5.3)
PROT SERPL-MCNC: 6.6 G/DL (ref 6.4–8.4)
PROT UR STRIP-MCNC: NEGATIVE MG/DL
RBC # BLD AUTO: 3.66 MILLION/UL (ref 3.88–5.62)
RBC #/AREA URNS AUTO: ABNORMAL /HPF
SODIUM SERPL-SCNC: 132 MMOL/L (ref 135–147)
SP GR UR STRIP.AUTO: 1.01 (ref 1–1.03)
TIBC SERPL-MCNC: 303 UG/DL (ref 250–450)
UIBC SERPL-MCNC: 257 UG/DL (ref 155–355)
UROBILINOGEN UR STRIP-ACNC: <2 MG/DL
VANCOMYCIN SERPL-MCNC: 22.5 UG/ML (ref 10–20)
WBC # BLD AUTO: 10.29 THOUSAND/UL (ref 4.31–10.16)
WBC #/AREA URNS AUTO: ABNORMAL /HPF

## 2024-07-13 PROCEDURE — 71045 X-RAY EXAM CHEST 1 VIEW: CPT

## 2024-07-13 PROCEDURE — 87086 URINE CULTURE/COLONY COUNT: CPT

## 2024-07-13 PROCEDURE — 99223 1ST HOSP IP/OBS HIGH 75: CPT

## 2024-07-13 PROCEDURE — 82948 REAGENT STRIP/BLOOD GLUCOSE: CPT

## 2024-07-13 PROCEDURE — 80202 ASSAY OF VANCOMYCIN: CPT

## 2024-07-13 PROCEDURE — 99285 EMERGENCY DEPT VISIT HI MDM: CPT

## 2024-07-13 PROCEDURE — 80053 COMPREHEN METABOLIC PANEL: CPT | Performed by: EMERGENCY MEDICINE

## 2024-07-13 PROCEDURE — 81001 URINALYSIS AUTO W/SCOPE: CPT

## 2024-07-13 PROCEDURE — 83540 ASSAY OF IRON: CPT

## 2024-07-13 PROCEDURE — 82728 ASSAY OF FERRITIN: CPT

## 2024-07-13 PROCEDURE — 87106 FUNGI IDENTIFICATION YEAST: CPT

## 2024-07-13 PROCEDURE — 99285 EMERGENCY DEPT VISIT HI MDM: CPT | Performed by: EMERGENCY MEDICINE

## 2024-07-13 PROCEDURE — 36415 COLL VENOUS BLD VENIPUNCTURE: CPT | Performed by: EMERGENCY MEDICINE

## 2024-07-13 PROCEDURE — 83880 ASSAY OF NATRIURETIC PEPTIDE: CPT | Performed by: EMERGENCY MEDICINE

## 2024-07-13 PROCEDURE — 83550 IRON BINDING TEST: CPT

## 2024-07-13 PROCEDURE — 85025 COMPLETE CBC W/AUTO DIFF WBC: CPT | Performed by: EMERGENCY MEDICINE

## 2024-07-13 RX ORDER — HYDROXYZINE HYDROCHLORIDE 25 MG/1
50 TABLET, FILM COATED ORAL
Status: DISCONTINUED | OUTPATIENT
Start: 2024-07-13 | End: 2024-07-15 | Stop reason: HOSPADM

## 2024-07-13 RX ORDER — VANCOMYCIN HYDROCHLORIDE 1 G/200ML
12.5 INJECTION, SOLUTION INTRAVENOUS EVERY 24 HOURS
Status: DISCONTINUED | OUTPATIENT
Start: 2024-07-14 | End: 2024-07-13

## 2024-07-13 RX ORDER — VANCOMYCIN HYDROCHLORIDE 1 G/200ML
12.5 INJECTION, SOLUTION INTRAVENOUS EVERY 12 HOURS
Status: DISCONTINUED | OUTPATIENT
Start: 2024-07-13 | End: 2024-07-13

## 2024-07-13 RX ORDER — CEFTRIAXONE 1 G/50ML
1000 INJECTION, SOLUTION INTRAVENOUS ONCE
Status: COMPLETED | OUTPATIENT
Start: 2024-07-13 | End: 2024-07-13

## 2024-07-13 RX ORDER — FUROSEMIDE 10 MG/ML
60 INJECTION INTRAMUSCULAR; INTRAVENOUS ONCE
Status: COMPLETED | OUTPATIENT
Start: 2024-07-13 | End: 2024-07-13

## 2024-07-13 RX ORDER — NIFEDIPINE 30 MG/1
30 TABLET, EXTENDED RELEASE ORAL DAILY
Status: DISCONTINUED | OUTPATIENT
Start: 2024-07-14 | End: 2024-07-15 | Stop reason: HOSPADM

## 2024-07-13 RX ORDER — ACETAMINOPHEN 325 MG/1
650 TABLET ORAL EVERY 6 HOURS PRN
Status: DISCONTINUED | OUTPATIENT
Start: 2024-07-13 | End: 2024-07-15 | Stop reason: HOSPADM

## 2024-07-13 RX ORDER — HYDROXYZINE HYDROCHLORIDE 25 MG/1
25 TABLET, FILM COATED ORAL
Status: DISCONTINUED | OUTPATIENT
Start: 2024-07-13 | End: 2024-07-13

## 2024-07-13 RX ORDER — OXYCODONE HYDROCHLORIDE 5 MG/1
5 TABLET ORAL EVERY 6 HOURS PRN
Status: DISCONTINUED | OUTPATIENT
Start: 2024-07-13 | End: 2024-07-15 | Stop reason: HOSPADM

## 2024-07-13 RX ORDER — VANCOMYCIN HYDROCHLORIDE 1 G/200ML
1000 INJECTION, SOLUTION INTRAVENOUS DAILY PRN
Status: DISCONTINUED | OUTPATIENT
Start: 2024-07-13 | End: 2024-07-15

## 2024-07-13 RX ORDER — LEVOFLOXACIN 250 MG/1
750 TABLET, FILM COATED ORAL EVERY OTHER DAY
Status: DISCONTINUED | OUTPATIENT
Start: 2024-07-14 | End: 2024-07-15

## 2024-07-13 RX ORDER — INSULIN LISPRO 100 [IU]/ML
1-5 INJECTION, SOLUTION INTRAVENOUS; SUBCUTANEOUS
Status: DISCONTINUED | OUTPATIENT
Start: 2024-07-13 | End: 2024-07-15 | Stop reason: HOSPADM

## 2024-07-13 RX ORDER — LEVOFLOXACIN 250 MG/1
500 TABLET, FILM COATED ORAL EVERY 24 HOURS
Status: DISCONTINUED | OUTPATIENT
Start: 2024-07-14 | End: 2024-07-13

## 2024-07-13 RX ORDER — CLONAZEPAM 1 MG/1
1 TABLET ORAL 2 TIMES DAILY
Status: DISCONTINUED | OUTPATIENT
Start: 2024-07-13 | End: 2024-07-15 | Stop reason: HOSPADM

## 2024-07-13 RX ORDER — FOLIC ACID 1 MG/1
2000 TABLET ORAL DAILY
Status: DISCONTINUED | OUTPATIENT
Start: 2024-07-14 | End: 2024-07-15 | Stop reason: HOSPADM

## 2024-07-13 RX ORDER — INSULIN LISPRO 100 [IU]/ML
1-6 INJECTION, SOLUTION INTRAVENOUS; SUBCUTANEOUS
Status: DISCONTINUED | OUTPATIENT
Start: 2024-07-14 | End: 2024-07-15 | Stop reason: HOSPADM

## 2024-07-13 RX ORDER — MIRTAZAPINE 15 MG/1
30 TABLET, FILM COATED ORAL
Status: DISCONTINUED | OUTPATIENT
Start: 2024-07-13 | End: 2024-07-15 | Stop reason: HOSPADM

## 2024-07-13 RX ORDER — TAMSULOSIN HYDROCHLORIDE 0.4 MG/1
0.4 CAPSULE ORAL
Status: DISCONTINUED | OUTPATIENT
Start: 2024-07-14 | End: 2024-07-15 | Stop reason: HOSPADM

## 2024-07-13 RX ORDER — FINASTERIDE 5 MG/1
5 TABLET, FILM COATED ORAL DAILY
Status: DISCONTINUED | OUTPATIENT
Start: 2024-07-14 | End: 2024-07-15 | Stop reason: HOSPADM

## 2024-07-13 RX ORDER — NIFEDIPINE 30 MG/1
90 TABLET, EXTENDED RELEASE ORAL DAILY
Status: DISCONTINUED | OUTPATIENT
Start: 2024-07-14 | End: 2024-07-15 | Stop reason: HOSPADM

## 2024-07-13 RX ORDER — PRAVASTATIN SODIUM 20 MG
20 TABLET ORAL
Status: DISCONTINUED | OUTPATIENT
Start: 2024-07-14 | End: 2024-07-15 | Stop reason: HOSPADM

## 2024-07-13 RX ORDER — HEPARIN SODIUM 5000 [USP'U]/ML
5000 INJECTION, SOLUTION INTRAVENOUS; SUBCUTANEOUS EVERY 8 HOURS SCHEDULED
Status: DISCONTINUED | OUTPATIENT
Start: 2024-07-13 | End: 2024-07-15 | Stop reason: HOSPADM

## 2024-07-13 RX ORDER — HYDRALAZINE HYDROCHLORIDE 20 MG/ML
5 INJECTION INTRAMUSCULAR; INTRAVENOUS EVERY 6 HOURS PRN
Status: DISCONTINUED | OUTPATIENT
Start: 2024-07-13 | End: 2024-07-15 | Stop reason: HOSPADM

## 2024-07-13 RX ORDER — GABAPENTIN 400 MG/1
400 CAPSULE ORAL 2 TIMES DAILY
Status: DISCONTINUED | OUTPATIENT
Start: 2024-07-13 | End: 2024-07-15 | Stop reason: HOSPADM

## 2024-07-13 RX ADMIN — CEFTRIAXONE 1000 MG: 1 INJECTION, SOLUTION INTRAVENOUS at 19:20

## 2024-07-13 RX ADMIN — MIRTAZAPINE 30 MG: 15 TABLET, FILM COATED ORAL at 22:14

## 2024-07-13 RX ADMIN — CLONAZEPAM 1 MG: 1 TABLET ORAL at 22:14

## 2024-07-13 RX ADMIN — OXYCODONE HYDROCHLORIDE 5 MG: 5 TABLET ORAL at 22:14

## 2024-07-13 RX ADMIN — HEPARIN SODIUM 5000 UNITS: 5000 INJECTION INTRAVENOUS; SUBCUTANEOUS at 22:22

## 2024-07-13 RX ADMIN — HYDROXYZINE HYDROCHLORIDE 50 MG: 25 TABLET ORAL at 22:14

## 2024-07-13 RX ADMIN — FUROSEMIDE 60 MG: 10 INJECTION, SOLUTION INTRAMUSCULAR; INTRAVENOUS at 22:19

## 2024-07-13 RX ADMIN — GABAPENTIN 400 MG: 400 CAPSULE ORAL at 22:14

## 2024-07-13 NOTE — ASSESSMENT & PLAN NOTE
Lab Results   Component Value Date    HGBA1C 7.9 (H) 05/19/2024     Hold home PO metformin/jardiance  ISS + accucheks   Diabetic diet

## 2024-07-13 NOTE — ASSESSMENT & PLAN NOTE
Currently being treated for prostatitis with PO Levaquin since July 4th with [facility]  Continue PO Levaquin 500 mg daily through 8/10

## 2024-07-13 NOTE — ED PROVIDER NOTES
History  Chief Complaint   Patient presents with    Wound Check     Patient presents to ED for open wounds to bilateral feet after laying in the sun on Tuesday.  Patient seen at PCP and was told the blisters were consistent with too much time in the sun.    Patient has a history of diabetes.    Patient currently receiving Vancomycin through his PICC line post back surgery with infection.      Patient is a 68 year old male who presents with multiples ulcerations to bilateral feet/ legs associated with bilateral lower extremity edema. Patient states that he was out in the sun on Tuesday while wearing crocs on his feet and pants.  He states that the next morning he woke up with both legs significantly swollen and developed ulcerations.  The ulcerations initially were just ulcerations, but they are becoming worse as he took a needle and popped them and then removed the overlying skin.  Denies any fevers, chills, chest pain, shortness of breath.  Patient reports that he was evaluated by his primary care doctor who told him that these are likely sunburn blisters and to apply Neosporin to them.          Prior to Admission Medications   Prescriptions Last Dose Informant Patient Reported? Taking?   ACCU-CHEK FABIANO PLUS test strip  Spouse/Significant Other Yes No   Si (four) times a day   ACCU-CHEK FASTCLIX LANCETS MISC  Spouse/Significant Other Yes No   Si (four) times a day   Azelastine HCl 137 MCG/SPRAY SOLN  Spouse/Significant Other Yes No   Sig: Every 12 hours   Patient not taking: Reported on 2024   GNP Aspirin Low Dose 81 MG EC tablet  Spouse/Significant Other Yes No   Sig: Take 1 tablet (81 mg total) by mouth daily Do not start before 2023.   Jardiance 10 MG TABS  Spouse/Significant Other Yes No   Sig: Take 10 mg by mouth every morning   NIFEdipine (PROCARDIA XL) 30 mg 24 hr tablet  Spouse/Significant Other Yes No   Sig: TAKE 1 TABLET BY MOUTH EVERY DAY IN ADDITION TO 90MG FOR A TOTAL OF 120MG  EVERY DAY   NIFEdipine (PROCARDIA XL) 90 mg 24 hr tablet  Spouse/Significant Other Yes No   Sig: Take 120 mg by mouth every morning Takes 90 mg and 30 mg =120 mg every AM   Vancomycin HCl 10 g SOLR  Spouse/Significant Other Yes No   acetaminophen (TYLENOL) 500 mg tablet  Spouse/Significant Other No No   Sig: Take 2 tablets (1,000 mg total) by mouth every 8 (eight) hours 500 mg tablet   celecoxib (CeleBREX) 100 mg capsule  Spouse/Significant Other No No   Sig: Take 1 capsule (100 mg total) by mouth 2 (two) times a day Take with food   clonazePAM (KlonoPIN) 1 mg tablet  Spouse/Significant Other Yes No   Sig: Take 1 mg by mouth 2 (two) times a day & 3rd pill PRN   finasteride (PROSCAR) 5 mg tablet  Spouse/Significant Other No No   Sig: Take 1 tablet (5 mg total) by mouth daily   fluticasone (FLONASE) 50 mcg/act nasal spray  Spouse/Significant Other Yes No   Si spray into each nostril daily as needed   folic acid (FOLVITE) 1 mg tablet  Spouse/Significant Other Yes No   Sig: Take 2,000 mcg by mouth daily   gabapentin (NEURONTIN) 400 mg capsule  Spouse/Significant Other No No   Sig: Take 1 capsule (400 mg total) by mouth 2 (two) times a day   Patient taking differently: Take 100 mg by mouth daily   hydrOXYzine pamoate (VISTARIL) 50 mg capsule  Spouse/Significant Other Yes No   Sig: Take 50 mg by mouth 2 (two) times a day. Indications: Feeling Anxious   ketoconazole (NIZORAL) 2 % shampoo  Spouse/Significant Other Yes No   Sig: Apply 1 application. topically daily Use as directed   lidocaine (LIDODERM) 5 %  Spouse/Significant Other No No   Sig: Apply 1 patch topically over 12 hours daily Remove & Discard patch within 12 hours or as directed by MD   loratadine (CLARITIN) 10 mg tablet  Spouse/Significant Other Yes No   Sig: Take 10 mg by mouth daily. per med list humberto SALAZAR  Indications: Hayfever   losartan-hydrochlorothiazide (HYZAAR) 100-25 MG per tablet  Spouse/Significant Other Yes No   Sig: Take 1 tablet by mouth  every morning   lovastatin (MEVACOR) 20 mg tablet  Spouse/Significant Other Yes No   Sig: Take 20 mg by mouth every morning   metFORMIN (GLUCOPHAGE) 1000 MG tablet  Spouse/Significant Other Yes No   Sig: Take 1,000 mg by mouth 2 (two) times a day with meals    methocarbamol (ROBAXIN) 750 mg tablet  Spouse/Significant Other No No   Sig: Take 1 tablet (750 mg total) by mouth every 6 (six) hours as needed for muscle spasms   metoprolol succinate (TOPROL-XL) 50 mg 24 hr tablet  Spouse/Significant Other Yes No   Sig: TAKE 1 AND 1/2 TABLET BY MOUTH ONCE DAILY   mirtazapine (REMERON) 45 MG tablet  Spouse/Significant Other Yes No   Sig: Take 45 mg by mouth daily at bedtime   ondansetron (Zofran ODT) 4 mg disintegrating tablet  Spouse/Significant Other No No   Sig: Take 1 tablet (4 mg total) by mouth every 6 (six) hours as needed for nausea or vomiting   patient supplied medication  Spouse/Significant Other Yes No   Sig: medical marijuana as needed per latest dci  Indications: .   senna (SENOKOT) 8.6 mg  Spouse/Significant Other No No   Sig: Take 1 tablet (8.6 mg total) by mouth 2 (two) times a day 2 tabs at bedtime as needed for constipation per latest dci   sertraline (ZOLOFT) 100 mg tablet  Spouse/Significant Other Yes No   Sig: Take 100 mg by mouth every morning   tamsulosin (FLOMAX) 0.4 mg  Spouse/Significant Other No No   Sig: Take 1 capsule (0.4 mg total) by mouth daily with dinner      Facility-Administered Medications: None       Past Medical History:   Diagnosis Date    Anxiety     Arthritis     Cancer (HCC)     Chronic back pain     Depression     Diabetes mellitus (HCC)     Hypertension     Liver disease     Mitral valve prolapse     Peripheral neuropathy     Neuropathy    PONV (postoperative nausea and vomiting)     Thyroid disease        Past Surgical History:   Procedure Laterality Date    COLONOSCOPY      INCISION AND DRAINAGE OF WOUND Left 08/12/2022    Procedure: INCISION AND DRAINAGE (I&D) EXTREMITY;   Surgeon: Moustapha Cote DPM;  Location:  MAIN OR;  Service: Podiatry    IR BIOPSY SPINE  2024    IR BIOPSY SPINE  2024    IR PICC PLACEMENT SINGLE LUMEN  2024    JOINT REPLACEMENT Left 2022    Left TSA    MS ARTHRODESIS POSTERIOR/PSTLAT TQ 1NTRSPC LUMBAR Bilateral 2023    Procedure: L1-S1 navigated posterior decompression with instrumented fixation fusion;  Surgeon: James Moraes MD;  Location:  MAIN OR;  Service: Neurosurgery    THYROID SURGERY      remove cancer       Family History   Problem Relation Age of Onset    No Known Problems Father     No Known Problems Mother     Colon cancer Neg Hx      I have reviewed and agree with the history as documented.    E-Cigarette/Vaping    E-Cigarette Use Current Some Day User     Comments medical marijuana - occ      E-Cigarette/Vaping Substances    Nicotine No     THC Yes     CBD Yes     Flavoring No     Other No     Unknown No      Social History     Tobacco Use    Smoking status: Some Days     Current packs/day: 0.00     Average packs/day: 0.3 packs/day for 5.9 years (1.5 ttl pk-yrs)     Types: Cigarettes     Start date: 1975     Last attempt to quit: 1981     Years since quittin.1    Smokeless tobacco: Never    Tobacco comments:     quit ; smokes when in pain as of 26   Vaping Use    Vaping status: Some Days    Substances: THC, CBD   Substance Use Topics    Alcohol use: Yes    Drug use: Yes     Frequency: 7.0 times per week     Types: Marijuana     Comment: Medical Marijuana, takes for pain, daily, vape       Review of Systems   Constitutional:  Negative for chills and fever.   Respiratory:  Negative for chest tightness and shortness of breath.    Cardiovascular:  Positive for leg swelling. Negative for chest pain and palpitations.   Gastrointestinal:  Negative for abdominal pain, nausea and vomiting.   Skin:  Positive for color change and wound.   Neurological:  Negative for weakness and numbness.       Physical  Exam  Physical Exam  Vitals and nursing note reviewed.   Constitutional:       General: He is not in acute distress.     Appearance: Normal appearance. He is not ill-appearing, toxic-appearing or diaphoretic.   HENT:      Head: Normocephalic and atraumatic.      Mouth/Throat:      Mouth: Mucous membranes are moist.   Eyes:      Conjunctiva/sclera: Conjunctivae normal.      Pupils: Pupils are equal, round, and reactive to light.   Cardiovascular:      Rate and Rhythm: Normal rate and regular rhythm.      Pulses: Normal pulses.      Heart sounds: Normal heart sounds. No murmur heard.  Pulmonary:      Effort: Pulmonary effort is normal. No respiratory distress.      Breath sounds: Normal breath sounds. No stridor. No wheezing, rhonchi or rales.   Chest:      Chest wall: No tenderness.   Abdominal:      General: Bowel sounds are normal. There is no distension.      Palpations: Abdomen is soft.      Tenderness: There is no abdominal tenderness. There is no guarding or rebound.   Musculoskeletal:      Right lower leg: Edema present.      Left lower leg: Edema present.      Comments: Bilateral lower extremity edema with ulceration that have erythema spreading from them and are warm to touch   Skin:     General: Skin is warm and dry.   Neurological:      General: No focal deficit present.      Mental Status: He is alert and oriented to person, place, and time. Mental status is at baseline.         Vital Signs  ED Triage Vitals [07/13/24 1717]   Temperature Pulse Respirations Blood Pressure SpO2   98.3 °F (36.8 °C) 83 18 112/68 98 %      Temp Source Heart Rate Source Patient Position - Orthostatic VS BP Location FiO2 (%)   Oral Monitor Sitting Left arm --      Pain Score       No Pain           Vitals:    07/13/24 1717   BP: 112/68   Pulse: 83   Patient Position - Orthostatic VS: Sitting         Visual Acuity  Visual Acuity      Flowsheet Row Most Recent Value   L Pupil Size (mm) 3   R Pupil Size (mm) 3            ED  Medications  Medications   cefTRIAXone (ROCEPHIN) IVPB (premix in dextrose) 1,000 mg 50 mL (has no administration in time range)       Diagnostic Studies  Results Reviewed       Procedure Component Value Units Date/Time    B-Type Natriuretic Peptide(BNP) [[id number]]  (Abnormal) Collected: 07/13/24 1757    Lab Status: Final result Specimen: Blood from Arm, Left Updated: 07/13/24 1823      pg/mL     Comprehensive metabolic panel [[id number]]  (Abnormal) Collected: 07/13/24 1757    Lab Status: Final result Specimen: Blood from Arm, Left Updated: 07/13/24 1816     Sodium 132 mmol/L      Potassium 3.9 mmol/L      Chloride 99 mmol/L      CO2 27 mmol/L      ANION GAP 6 mmol/L      BUN 20 mg/dL      Creatinine 1.55 mg/dL      Glucose 132 mg/dL      Calcium 8.6 mg/dL      AST 13 U/L      ALT 7 U/L      Alkaline Phosphatase 60 U/L      Total Protein 6.6 g/dL      Albumin 3.7 g/dL      Total Bilirubin 0.25 mg/dL      eGFR 45 ml/min/1.73sq m     Narrative:      National Kid[employer] guidelines for Chronic Kidney Disease (CKD):     Stage 1 with normal or high GFR (GFR > 90 mL/min/1.73 square meters)    Stage 2 Mild CKD (GFR = 60-89 mL/min/1.73 square meters)    Stage 3A Moderate CKD (GFR = 45-59 mL/min/1.73 square meters)    Stage 3B Moderate CKD (GFR = 30-44 mL/min/1.73 square meters)    Stage 4 Severe CKD (GFR = 15-29 mL/min/1.73 square meters)    Stage 5 End Stage CKD (GFR <15 mL/min/1.73 square meters)  Note: GFR calculation is accurate only with a steady state creatinine    CBC and differential [[id number]]  (Abnormal) Collected: 07/13/24 1757    Lab Status: Final result Specimen: Blood from Arm, Left Updated: 07/13/24 1802     WBC 10.29 Thousand/uL      RBC 3.66 Million/uL      Hemoglobin 8.8 g/dL      Hematocrit 29.6 %      MCV 81 fL      MCH 24.0 pg      MCHC 29.7 g/dL      RDW 19.1 %      MPV 9.8 fL      Platelets 282 Thousands/uL      nRBC 0 /100 WBCs      Segmented % 68 %      Immature Grans % 1 %       Lymphocytes % 18 %      Monocytes % 8 %      Eosinophils Relative 4 %      Basophils Relative 1 %      Absolute Neutrophils 7.06 Thousands/µL      Absolute Immature Grans 0.05 Thousand/uL      Absolute Lymphocytes 1.87 Thousands/µL      Absolute Monocytes 0.85 Thousand/µL      Eosinophils Absolute 0.41 Thousand/µL      Basophils Absolute 0.05 Thousands/µL                    XR chest 1 view portable   ED Interpretation by Stefany Urbina DO (07/13 1802)   No acute abnormalities as interpreted by me independently         Final Result by Mery Mckee MD (07/13 1829)      Mild pulmonary venous congestion.            Workstation performed: [id number]                    Procedures  Procedures         ED Course  ED Course as of 07/13/24 1902   Sat Jul 13, 2024   1848 Due to concern for mild cellulitis of the LEs surrounding the ulcerations and patient already being on PO levaquin and IV vanco, discussed with hospitalist to admit for observation and give dose of rocephin presently.                                  SBIRT 22yo+      Flowsheet Row Most Recent Value   Initial Alcohol Screen: US AUDIT-C     1. How often do you have a drink containing alcohol? 0 Filed at: 07/13/2024 1719   2. How many drinks containing alcohol do you have on a typical day you are drinking?  0 Filed at: 07/13/2024 1719   3b. FEMALE Any Age, or MALE 65+: How often do you have 4 or more drinks on one occassion? 0 Filed at: 07/13/2024 1719   Audit-C Score 0 Filed at: 07/13/2024 1719   JARON: How many times in the past year have you...    Used an illegal drug or used a prescription medication for non-medical reasons? Never Filed at: 07/13/2024 1719                      Medical Decision Making  Assessment and plan:  As patient has no sunburn lines or tan skin in the areas of the crocs were not covering, I am concerned that this is not due to sunburn and that patient is having lower extremity edema with ulcerations due to skin breakdown.   There is a bit of erythema around the blister/ulcer sites.  Will check basic labs to evaluate for leukocytosis, anemia, electrolyte abnormalities, kidney and liver function; chest x-ray to evaluate for pulmonary vascular congestion/pleural fusions.    Review of medical records, specifically from discharge summary from 6/28/2024 shows that the patient has a past medical history significant for diabetes, hypertension, BPH with urinary retention, thyroid cancer status post partial thyroidectomy, alcoholic cirrhosis, discitis of lumbosacral region.  Patient is on IV vancomycin via PICC line through 8/9 per [facility] infectious disease.  Per report from patient's wife, patient is also currently on Levaquin from 7/4 for prostatitis.    Amount and/or Complexity of Data Reviewed  Labs: ordered.  Radiology: ordered and independent interpretation performed.    Risk  Prescription drug management.  Decision regarding hospitalization.                 Disposition  Final diagnoses:   Bilateral lower extremity edema   Leg ulcer, bilateral (HCC)   Cellulitis     Time reflects when diagnosis was documented in both MDM as applicable and the Disposition within this note       Time User Action Codes Description Comment    7/13/2024  6:42 PM Stefany Urbina [R60.0] Bilateral lower extremity edema     7/13/2024  6:43 PM Stefany Urbina Add [L97.909] Leg ulcer (HCC)     7/13/2024  6:43 PM Stefany Urbina Modify [L97.909] Leg ulcer, bilateral (HCC)     7/13/2024  6:50 PM Stefany Urbina Add [L03.90] Cellulitis           ED Disposition       ED Disposition   Admit    Condition   Stable    Date/Time   Sat Jul 13, 2024 1844    Comment   Case was discussed with hospitalist and the patient's admission status was agreed to be Admission Status: observation status to the service of Dr. Cobb .               Follow-up Information    None         Patient's Medications   Discharge Prescriptions    No medications on file       No discharge  procedures on file.    PDMP Review         Value Time User    PDMP Reviewed  Yes 2/6/2024 12:43 PM Sintia Soto PA-C            ED Provider  Electronically Signed by             Stefany Urbina DO  07/13/24 [id number]

## 2024-07-13 NOTE — ASSESSMENT & PLAN NOTE
Presented due to bilateral lower extremity ulcers which began as thin-walled blisters which ruptured 1 to 2 days ago with serous fluid output, no purulence.  Notably with worsening lower extremity edema for the past 1 to 2 weeks.  Suspect worsening venous stasis ulcers in setting of LE edema  Not meeting SIRS criteria, clinically without purulence or s/sx of infection  Continue home PO levaquin and IV vanc   Podiatry consult/wound care  IV lasix x 1

## 2024-07-13 NOTE — ASSESSMENT & PLAN NOTE
Appears volume overloaded with LE edema, CXR with pulmonary vascular congestion,   Not maintained on diuretics at baseline, recently started on HCTZ 1 week ago  Stable on RA  Will trial IV lasix 60 mg x 1, monitor UOP & renal function  May require further diuresis in am   Echo pending (none available per chart review)  Venous duplex pending

## 2024-07-13 NOTE — PLAN OF CARE
Problem: Potential for Falls  Goal: Patient will remain free of falls  Description: INTERVENTIONS:  - Educate patient/family on patient safety including physical limitations  - Instruct patient to call for assistance with activity   - Consult OT/PT to assist with strengthening/mobility   - Keep Call bell within reach  - Keep bed low and locked with side rails adjusted as appropriate  - Keep care items and personal belongings within reach  - Initiate and maintain comfort rounds  - Make Fall Risk Sign visible to staff  - Offer Toileting every 2 Hours, in advance of need  - Initiate/Maintain bedalarm  - Obtain necessary fall risk management equipment: non skiid footwear, urinal  - Apply yellow socks and bracelet for high fall risk patients  - Consider moving patient to room near nurses station  7/13/2024 1959 by Lisha Benavidez RN  Outcome: Progressing  7/13/2024 1958 by Lisha Benavidez RN  Outcome: Progressing     Problem: PAIN - ADULT  Goal: Verbalizes/displays adequate comfort level or baseline comfort level  Description: Interventions:  - Encourage patient to monitor pain and request assistance  - Assess pain using appropriate pain scale  - Administer analgesics based on type and severity of pain and evaluate response  - Implement non-pharmacological measures as appropriate and evaluate response  - Consider cultural and social influences on pain and pain management  - Notify physician/advanced practitioner if interventions unsuccessful or patient reports new pain  7/13/2024 1959 by Lisha Benavidez RN  Outcome: Progressing  7/13/2024 1958 by Lisha Benavidez RN  Outcome: Progressing     Problem: INFECTION - ADULT  Goal: Absence or prevention of progression during hospitalization  Description: INTERVENTIONS:  - Assess and monitor for signs and symptoms of infection  - Monitor lab/diagnostic results  - Monitor all insertion sites, i.e. indwelling lines, tubes, and drains  - Monitor endotracheal if appropriate  and nasal secretions for changes in amount and color  - [place] appropriate cooling/warming therapies per order  - Administer medications as ordered  - Instruct and encourage patient and family to use good hand hygiene technique  - Identify and instruct in appropriate isolation precautions for identified infection/condition  7/13/2024 1959 by Lisha Benavidez RN  Outcome: Progressing  7/13/2024 1958 by Lisha Benavidez RN  Outcome: Progressing  Goal: Absence of fever/infection during neutropenic period  Description: INTERVENTIONS:  - Monitor WBC    7/13/2024 1959 by Lisha Benavidez RN  Outcome: Progressing  7/13/2024 1958 by Lisha Benavidez RN  Outcome: Progressing     Problem: SAFETY ADULT  Goal: Patient will remain free of falls  Description: INTERVENTIONS:  - Educate patient/family on patient safety including physical limitations  - Instruct patient to call for assistance with activity   - Consult OT/PT to assist with strengthening/mobility   - Keep Call bell within reach  - Keep bed low and locked with side rails adjusted as appropriate  - Keep care items and personal belongings within reach  - Initiate and maintain comfort rounds  - Make Fall Risk Sign visible to staff  - Offer Toileting every 2 Hours, in advance of need  - Initiate/Maintain bed alarm  - Obtain necessary fall risk management equipment: non skid footwear  - Apply yellow socks and bracelet for high fall risk patients  - Consider moving patient to room near nurses station  7/13/2024 1959 by Lisha Benavidez RN  Outcome: Progressing  7/13/2024 1958 by Lisha Benavidez RN  Outcome: Progressing  Goal: Maintain or return to baseline ADL function  Description: INTERVENTIONS:  -  Assess patient's ability to carry out ADLs; assess patient's baseline for ADL function and identify physical deficits which impact ability to perform ADLs (bathing, care of mouth/teeth, toileting, grooming, dressing, etc.)  - Assess/evaluate cause of self-care  deficits   - Assess range of motion  - Assess patient's mobility; develop plan if impaired  - Assess patient's need for assistive devices and provide as appropriate  - Encourage maximum independence but intervene and supervise when necessary  - Involve family in performance of ADLs  - Assess for home care needs following discharge   - Consider OT consult to assist with ADL evaluation and planning for discharge  - Provide patient education as appropriate  7/13/2024 1959 by Lisha Benavidez RN  Outcome: Progressing  7/13/2024 1958 by Lisha Benavidez RN  Outcome: Progressing  Goal: Maintains/Returns to pre admission functional level  Description: INTERVENTIONS:  - Perform AM-PAC 6 Click Basic Mobility/ Daily Activity assessment daily.  - Set and communicate daily mobility goal to care team and patient/family/caregiver.   - Collaborate with rehabilitation services on mobility goals if consulted  - Perform Range of Motion 2 times a day.  - Reposition patient every 2 hours.  - Dangle patient 2 times a day  - Stand patient 2 times a day  - Ambulate patient 2 times a day  - Out of bed to chair 2 times a day   - Out of bed for meals 2 times a day  - Out of bed for toileting  - Record patient progress and toleration of activity level   7/13/2024 1959 by Lisha Benavidez RN  Outcome: Progressing  7/13/2024 1958 by Lisha Benavidez RN  Outcome: Progressing     Problem: DISCHARGE PLANNING  Goal: Discharge to home or other facility with appropriate resources  Description: INTERVENTIONS:  - Identify barriers to discharge w/patient and caregiver  - Arrange for needed discharge resources and transportation as appropriate  - Identify discharge learning needs (meds, wound care, etc.)  - Arrange for interpretive services to assist at discharge as needed  - Refer to Case Management Department for coordinating discharge planning if the patient needs post-hospital services based on physician/advanced practitioner order or complex  needs related to functional status, cognitive ability, or social support system  7/13/2024 1959 by Lisha Benavidez RN  Outcome: Progressing  7/13/2024 1958 by Lisha Benavidez RN  Outcome: Progressing     Problem: Knowledge Deficit  Goal: Patient/family/caregiver demonstrates understanding of disease process, treatment plan, medications, and discharge instructions  Description: Complete learning assessment and assess knowledge base.  Interventions:  - Provide teaching at level of understanding  - Provide teaching via preferred learning methods  7/13/2024 1959 by Lisha Benavidez RN  Outcome: Progressing  7/13/2024 1958 by Lisha Benavidez RN  Outcome: Progressing     Problem: METABOLIC, FLUID AND ELECTROLYTES - ADULT  Goal: Glucose maintained within target range  Description: INTERVENTIONS:  - Monitor Blood Glucose as ordered  - Assess for signs and symptoms of hyperglycemia and hypoglycemia  - Administer ordered medications to maintain glucose within target range  - Assess nutritional intake and initiate nutrition service referral as needed  7/13/2024 1959 by Lisha Benavidez RN  Outcome: Progressing  7/13/2024 1958 by Lisha Benavidez RN  Outcome: Progressing     Problem: SKIN/TISSUE INTEGRITY - ADULT  Goal: Skin Integrity remains intact(Skin Breakdown Prevention)  Description: Assess:  -Perform Sanjay assessment every shift  -Clean and moisturize skin every daily and prn  -Inspect skin when repositioning, toileting, and assisting with ADLS  -Assess under medical devices such as masimo every shift  -Assess extremities for adequate circulation and sensation     Bed Management:  -Have minimal linens on bed & keep smooth, unwrinkled  -Change linens as needed when moist or perspiring  -Avoid sitting or lying in one position for more than 2 hours while in bed  -Keep HOB at 30degrees     Toileting:  -Offer bedside commode      Activity:  -Mobilize patient 4 times a day  -Encourage activity and walks on  unit  -Encourage or provide ROM exercises   -Use appropriate equipment to lift or move patient in bed      Skin Care:  -Avoid use of baby powder, tape, friction and shearing, hot water or constrictive clothing  -Do not massage red bony areas    Next Steps:  -Teach patient strategies to minimize risks such as  weight shifting   -Consider consults to  interdisciplinary teams such as nutrition, wound  7/13/2024 1959 by Lisha Benavidez RN  Outcome: Progressing  7/13/2024 1958 by Lisha Benavidez RN  Outcome: Progressing  Goal: Incision(s), wounds(s) or drain site(s) healing without S/S of infection  Description: INTERVENTIONS  - Assess and document dressing, incision, wound bed, drain sites and surrounding tissue  - Provide patient and family education  - Perform skin care/dressing changes every shift  7/13/2024 1959 by Lisha Benavidez RN  Outcome: Progressing  7/13/2024 1958 by Lisha Benavidez RN  Outcome: Progressing

## 2024-07-14 LAB
ALBUMIN SERPL BCG-MCNC: 3.7 G/DL (ref 3.5–5)
ALP SERPL-CCNC: 61 U/L (ref 34–104)
ALT SERPL W P-5'-P-CCNC: 5 U/L (ref 7–52)
ANION GAP SERPL CALCULATED.3IONS-SCNC: 8 MMOL/L (ref 4–13)
AST SERPL W P-5'-P-CCNC: 12 U/L (ref 13–39)
BASOPHILS # BLD AUTO: 0.08 THOUSANDS/ÂΜL (ref 0–0.1)
BASOPHILS NFR BLD AUTO: 1 % (ref 0–1)
BILIRUB SERPL-MCNC: 0.32 MG/DL (ref 0.2–1)
BUN SERPL-MCNC: 19 MG/DL (ref 5–25)
CALCIUM SERPL-MCNC: 9.1 MG/DL (ref 8.4–10.2)
CHLORIDE SERPL-SCNC: 99 MMOL/L (ref 96–108)
CO2 SERPL-SCNC: 29 MMOL/L (ref 21–32)
CREAT SERPL-MCNC: 1.35 MG/DL (ref 0.6–1.3)
D DIMER PPP FEU-MCNC: 1.42 UG/ML FEU
EOSINOPHIL # BLD AUTO: 0.46 THOUSAND/ÂΜL (ref 0–0.61)
EOSINOPHIL NFR BLD AUTO: 5 % (ref 0–6)
ERYTHROCYTE [DISTWIDTH] IN BLOOD BY AUTOMATED COUNT: 19.2 % (ref 11.6–15.1)
GFR SERPL CREATININE-BSD FRML MDRD: 53 ML/MIN/1.73SQ M
GLUCOSE SERPL-MCNC: 107 MG/DL (ref 65–140)
GLUCOSE SERPL-MCNC: 118 MG/DL (ref 65–140)
GLUCOSE SERPL-MCNC: 162 MG/DL (ref 65–140)
GLUCOSE SERPL-MCNC: 169 MG/DL (ref 65–140)
GLUCOSE SERPL-MCNC: 91 MG/DL (ref 65–140)
HCT VFR BLD AUTO: 30.6 % (ref 36.5–49.3)
HGB BLD-MCNC: 9.4 G/DL (ref 12–17)
IMM GRANULOCYTES # BLD AUTO: 0.04 THOUSAND/UL (ref 0–0.2)
IMM GRANULOCYTES NFR BLD AUTO: 0 % (ref 0–2)
LYMPHOCYTES # BLD AUTO: 1.79 THOUSANDS/ÂΜL (ref 0.6–4.47)
LYMPHOCYTES NFR BLD AUTO: 18 % (ref 14–44)
MCH RBC QN AUTO: 24.6 PG (ref 26.8–34.3)
MCHC RBC AUTO-ENTMCNC: 30.7 G/DL (ref 31.4–37.4)
MCV RBC AUTO: 80 FL (ref 82–98)
MONOCYTES # BLD AUTO: 0.83 THOUSAND/ÂΜL (ref 0.17–1.22)
MONOCYTES NFR BLD AUTO: 8 % (ref 4–12)
NEUTROPHILS # BLD AUTO: 6.96 THOUSANDS/ÂΜL (ref 1.85–7.62)
NEUTS SEG NFR BLD AUTO: 68 % (ref 43–75)
NRBC BLD AUTO-RTO: 0 /100 WBCS
PLATELET # BLD AUTO: 292 THOUSANDS/UL (ref 149–390)
PMV BLD AUTO: 10.1 FL (ref 8.9–12.7)
POTASSIUM SERPL-SCNC: 4 MMOL/L (ref 3.5–5.3)
PROT SERPL-MCNC: 6.4 G/DL (ref 6.4–8.4)
RBC # BLD AUTO: 3.82 MILLION/UL (ref 3.88–5.62)
SODIUM SERPL-SCNC: 136 MMOL/L (ref 135–147)
VANCOMYCIN SERPL-MCNC: 21.8 UG/ML (ref 10–20)
WBC # BLD AUTO: 10.16 THOUSAND/UL (ref 4.31–10.16)

## 2024-07-14 PROCEDURE — 80053 COMPREHEN METABOLIC PANEL: CPT

## 2024-07-14 PROCEDURE — 99214 OFFICE O/P EST MOD 30 MIN: CPT | Performed by: PODIATRIST

## 2024-07-14 PROCEDURE — 85379 FIBRIN DEGRADATION QUANT: CPT | Performed by: INTERNAL MEDICINE

## 2024-07-14 PROCEDURE — 80202 ASSAY OF VANCOMYCIN: CPT | Performed by: INTERNAL MEDICINE

## 2024-07-14 PROCEDURE — 82948 REAGENT STRIP/BLOOD GLUCOSE: CPT

## 2024-07-14 PROCEDURE — 99233 SBSQ HOSP IP/OBS HIGH 50: CPT | Performed by: INTERNAL MEDICINE

## 2024-07-14 PROCEDURE — 85025 COMPLETE CBC W/AUTO DIFF WBC: CPT

## 2024-07-14 RX ORDER — DOXYCYCLINE HYCLATE 100 MG/1
100 CAPSULE ORAL 2 TIMES DAILY
COMMUNITY
Start: 2024-08-10

## 2024-07-14 RX ORDER — METHOCARBAMOL 500 MG/1
500 TABLET, FILM COATED ORAL EVERY 6 HOURS PRN
Status: DISCONTINUED | OUTPATIENT
Start: 2024-07-14 | End: 2024-07-15 | Stop reason: HOSPADM

## 2024-07-14 RX ORDER — FUROSEMIDE 10 MG/ML
20 INJECTION INTRAMUSCULAR; INTRAVENOUS ONCE
Status: COMPLETED | OUTPATIENT
Start: 2024-07-14 | End: 2024-07-14

## 2024-07-14 RX ORDER — OXYCODONE HYDROCHLORIDE 5 MG/1
5 TABLET ORAL ONCE
Status: COMPLETED | OUTPATIENT
Start: 2024-07-14 | End: 2024-07-14

## 2024-07-14 RX ORDER — VANCOMYCIN HYDROCHLORIDE 5 G/1
INJECTION, POWDER, LYOPHILIZED, FOR SOLUTION INTRAVENOUS
COMMUNITY
Start: 2024-07-10 | End: 2024-07-17 | Stop reason: CLARIF

## 2024-07-14 RX ADMIN — HEPARIN SODIUM 5000 UNITS: 5000 INJECTION INTRAVENOUS; SUBCUTANEOUS at 15:44

## 2024-07-14 RX ADMIN — NIFEDIPINE 90 MG: 30 TABLET, EXTENDED RELEASE ORAL at 08:08

## 2024-07-14 RX ADMIN — ACETAMINOPHEN 650 MG: 325 TABLET, FILM COATED ORAL at 22:19

## 2024-07-14 RX ADMIN — OXYCODONE HYDROCHLORIDE 5 MG: 5 TABLET ORAL at 22:18

## 2024-07-14 RX ADMIN — TAMSULOSIN HYDROCHLORIDE 0.4 MG: 0.4 CAPSULE ORAL at 15:35

## 2024-07-14 RX ADMIN — HEPARIN SODIUM 5000 UNITS: 5000 INJECTION INTRAVENOUS; SUBCUTANEOUS at 22:18

## 2024-07-14 RX ADMIN — PRAVASTATIN SODIUM 20 MG: 20 TABLET ORAL at 15:35

## 2024-07-14 RX ADMIN — HYDROXYZINE HYDROCHLORIDE 50 MG: 25 TABLET ORAL at 22:19

## 2024-07-14 RX ADMIN — GABAPENTIN 400 MG: 400 CAPSULE ORAL at 08:06

## 2024-07-14 RX ADMIN — CLONAZEPAM 1 MG: 1 TABLET ORAL at 17:37

## 2024-07-14 RX ADMIN — ACETAMINOPHEN 650 MG: 325 TABLET, FILM COATED ORAL at 15:52

## 2024-07-14 RX ADMIN — GABAPENTIN 400 MG: 400 CAPSULE ORAL at 17:37

## 2024-07-14 RX ADMIN — OXYCODONE HYDROCHLORIDE 5 MG: 5 TABLET ORAL at 04:34

## 2024-07-14 RX ADMIN — FUROSEMIDE 20 MG: 10 INJECTION, SOLUTION INTRAMUSCULAR; INTRAVENOUS at 15:30

## 2024-07-14 RX ADMIN — MIRTAZAPINE 30 MG: 15 TABLET, FILM COATED ORAL at 22:18

## 2024-07-14 RX ADMIN — ACETAMINOPHEN 650 MG: 325 TABLET, FILM COATED ORAL at 09:30

## 2024-07-14 RX ADMIN — HEPARIN SODIUM 5000 UNITS: 5000 INJECTION INTRAVENOUS; SUBCUTANEOUS at 04:36

## 2024-07-14 RX ADMIN — OXYCODONE HYDROCHLORIDE 5 MG: 5 TABLET ORAL at 17:42

## 2024-07-14 RX ADMIN — NIFEDIPINE 30 MG: 30 TABLET, EXTENDED RELEASE ORAL at 08:07

## 2024-07-14 RX ADMIN — LEVOFLOXACIN 750 MG: 250 TABLET, FILM COATED ORAL at 08:06

## 2024-07-14 RX ADMIN — CLONAZEPAM 1 MG: 1 TABLET ORAL at 08:06

## 2024-07-14 RX ADMIN — FINASTERIDE 5 MG: 5 TABLET, FILM COATED ORAL at 08:06

## 2024-07-14 RX ADMIN — ACETAMINOPHEN 650 MG: 325 TABLET, FILM COATED ORAL at 01:10

## 2024-07-14 RX ADMIN — INSULIN LISPRO 1 UNITS: 100 INJECTION, SOLUTION INTRAVENOUS; SUBCUTANEOUS at 13:00

## 2024-07-14 RX ADMIN — FOLIC ACID 2000 MCG: 1 TABLET ORAL at 08:07

## 2024-07-14 RX ADMIN — METHOCARBAMOL TABLETS 500 MG: 500 TABLET, COATED ORAL at 17:42

## 2024-07-14 NOTE — PROGRESS NOTES
Juan San is a 69 y.o. male who is currently ordered Vancomycin IV with management by the Pharmacy Consult service.  Relevant clinical data and objective / subjective history reviewed.  Vancomycin Assessment:  Indication and Goal AUC/Trough: Bone/joint infection (goal -600, trough >10)  Clinical Status: stable  Micro:   Pending  Renal Function:  SCr: 1.35 mg/dL  CrCl: 58.7 mL/min  Renal replacement: Not on dialysis  Days of Therapy: 2 in hospital (patient is on 8 weeks vanco IV therapy outpatient until Aug 9 2024)   Current Dose: 1000mg daily PRN if level </=15  Vancomycin Plan: random level resulted 21.8. Since the level is >15, no dose will be given today  New Dosing: No change  Next Level: 7/15/24 at 0600  Renal Function Monitoring: Daily BMP and UOP  Pharmacy will continue to follow closely for s/sx of nephrotoxicity, infusion reactions and appropriateness of therapy.  BMP and CBC will be ordered per protocol.     Also, patient is on levaquin 500mg daily until Aug 10 2024 per outpatient ID provider. We will continue to follow the patient’s culture results and clinical progress daily.    Bi Perez, Pharmacist, PharmD, BCPS

## 2024-07-14 NOTE — PROGRESS NOTES
[facility]  Progress Note  Name: Juan San I  MRN: [id number]  Unit/Bed#: -01 I Date of Admission: 7/13/2024   Date of Service: 7/14/2024 I Hospital Day: 0    Assessment & Plan   LIGIA (acute kidney injury) (HCC)  Assessment & Plan  Cr baseline appears 0.8  Cr on admission 1.5  Suspect volume overload however notably patient on chronic NSAIDs + levaquin for prostatitis  Bladder scan + retention protocol  UA pending   Hold IVF given edema, trial IV lasix x 1  Hold ARB/HCTZ  Trend BMP  Renally dose medications    Lower extremity edema  Assessment & Plan  Appears volume overloaded with LE edema, CXR with pulmonary vascular congestion,   Not maintained on diuretics at baseline, recently started on HCTZ 1 week ago  Stable on RA  Will trial IV lasix 60 mg x 1, monitor UOP & renal function- addtl lasix today  Echo pending (none available per chart review)  Venous duplex pending - d dimer elevted    Prostatitis  Assessment & Plan  Currently being treated for prostatitis with PO Levaquin since July 4th with [facility]  Continue PO Levaquin 500 mg daily through 8/10    Type 2 diabetes mellitus with hyperglycemia, without long-term current use of insulin ([facility])  Assessment & Plan  Lab Results   Component Value Date    HGBA1C 7.9 (H) 05/19/2024     Hold home PO metformin/jardiance  ISS + accucheks   Diabetic diet      Discitis of lumbosacral region  Assessment & Plan  Previously admitted [facility] June 2024, transferred to [facility] where patient was recently discharged from  Continue home vancomycin via PICC line through 8/10, then patient is to transition to doxycycline 100 mg twice daily  Continue home pain medication including gabapentin  Holding home NSAIDs    Hypertension  Assessment & Plan  Continue home nifedipine  Holding home losartan-HCTZ due to renal function  IV hydralazine as needed    * Venous stasis ulcers (HCC)  Assessment & Plan  Presented due to bilateral lower  extremity ulcers which began as thin-walled blisters which ruptured 1 to 2 days ago with serous fluid output, no purulence.  Notably with worsening lower extremity edema for the past 1 to 2 weeks.  Suspect worsening venous stasis ulcers in setting of LE edema  Not meeting SIRS criteria, clinically without purulence or s/sx of infection  Continue home PO levaquin and IV vanc   Podiatry consult/wound care  IV lasix x 1             VTE  Prophylaxis:   Pharmacologic: in place, heparin  Mechanical VTE Prophylaxis in Place: Yes    Patient Centered Rounds: I have performed bedside rounds with nursing staff today.    Discussions with Specialists or Other Care Team Provider: case management    Education and Discussions with Family / Patient: edinsonnina wife    Mobility:   Basic Mobility Inpatient Raw Score: 21  JH-HLM Goal: 6: Walk 10 steps or more  JH-HLM Achieved: 6: Walk 10 steps or more  JH-HLM Goal achieved. Continue to encourage appropriate mobility.    Total Time Spent on Date of Encounter in care of patient: 55 mins. This time was spent on one or more of the following: performing physical exam; counseling and coordination of care; obtaining or reviewing history; documenting in the medical record; reviewing/ordering tests, medications or procedures; communicating with other healthcare professionals and discussing with patient's family/caregivers.      Current Length of Stay: 0 day(s)    Current Patient Status: Observation        Code Status: Level 1 - Full Code    Discharge Plan: Pt will require continued inpatient hospitalization.    Subjective:     Has lower extremity edema    Patient is seen and examined at bedside.  All other ROS are negative.    Objective:     Vitals:   Temp (24hrs), Av.2 °F (36.8 °C), Min:98.2 °F (36.8 °C), Max:98.3 °F (36.8 °C)    Temp:  [98.2 °F (36.8 °C)-98.3 °F (36.8 °C)] 98.2 °F (36.8 °C)  HR:  [72-83] 72  Resp:  [16-18] 16  BP: (112-123)/(61-68) 123/61  SpO2:  [98 %] 98 %  Body mass  index is 27.35 kg/m².     Input and Output Summary (last 24 hours):       Intake/Output Summary (Last 24 hours) at 7/14/2024 1440  Last data filed at 7/14/2024 0801  Gross per 24 hour   Intake --   Output 1775 ml   Net -1775 ml       Physical Exam:       GEN: No acute distress, comfortable  HEEENT: No JVD, PERRLA, no scleral icterus  RESP: Lungs clear to auscultation bilaterally  CV: RRR, +s1/s2   ABD: SOFT NON TENDER, POSITIVE BOWEL SOUNDS, NO DISTENTION  PSYCH: CALM  NEURO: [place] baseline, NO FOCAL DEFICITS  SKIN: NO RASH  EXTREM: ++EDEMA    Additional Data:     Labs:    Results from last 7 days   Lab Units 07/14/24  0443   WBC Thousand/uL 10.16   HEMOGLOBIN g/dL 9.4*   HEMATOCRIT % 30.6*   PLATELETS Thousands/uL 292   SEGS PCT % 68   LYMPHO PCT % 18   MONO PCT % 8   EOS PCT % 5     Results from last 7 days   Lab Units 07/14/24  0443   SODIUM mmol/L 136   POTASSIUM mmol/L 4.0   CHLORIDE mmol/L 99   CO2 mmol/L 29   BUN mg/dL 19   CREATININE mg/dL 1.35*   ANION GAP mmol/L 8   CALCIUM mg/dL 9.1   ALBUMIN g/dL 3.7   TOTAL BILIRUBIN mg/dL 0.32   ALK PHOS U/L 61   ALT U/L 5*   AST U/L 12*   GLUCOSE RANDOM mg/dL 162*         Results from last 7 days   Lab Units 07/14/24  1110 07/14/24  0701 07/13/24 2012   POC GLUCOSE mg/dl 169* 118 91               Lines/Drains:  Invasive Devices       Peripherally Inserted Central Catheter Line  Duration             PICC Line 06/03/24 Right Brachial 41 days              Peripheral Intravenous Line  Duration             Peripheral IV 07/13/24 Left Antecubital <1 day                    Telemetry:        * I Have Reviewed All Lab Data Listed Above.           Imaging:     Results for orders placed during the hospital encounter of 07/13/24    XR chest 1 view portable    Narrative  XR CHEST PORTABLE    INDICATION: leg swelling.    COMPARISON: CXR and abdomen CT 6/27/2024 and chest CT 4/17/2023.    FINDINGS: Right PICC in mid SVC.    Mild pulmonary venous congestion. No pneumothorax or  pleural effusion. Mild calcification over the right midlung is in the anterior chest wall on CT.    Normal cardiomediastinal silhouette.    Bones are unremarkable for age. Barely imaged thoracolumbar fusion hardware. Left shoulder arthroplasty.    Normal upper abdomen.    Impression  Mild pulmonary venous congestion.        Workstation performed: [id number]    Results for orders placed during the hospital encounter of 06/27/24    XR chest pa & lateral    Narrative  XR CHEST PA & LATERAL    INDICATION: reports chills.    COMPARISON: Chest radiograph 10/9/2023.    FINDINGS:    No consolidation or edema. Scattered right lung calcified granulomas. No pneumothorax or pleural effusion.    Normal cardiomediastinal silhouette.    Partially visualized left shoulder arthroplasty. Partially visualized lumbar spine fusion.    Normal upper abdomen.    Impression  No acute cardiopulmonary disease.        Workstation performed: [id number]      *I have reviewed all imaging reports listed above      Recent Cultures (last 7 days):           Last 24 Hours Medication List:   Current Facility-Administered Medications   Medication Dose Route Frequency Provider Last Rate    acetaminophen  650 mg Oral Q6H PRN Diana Mcdonald PA-C      clonazePAM  1 mg Oral BID Diana Mcdonald PA-C      finasteride  5 mg Oral Daily Diana Mcdonald PA-C      folic acid  2,000 mcg Oral Daily Diana Mcdonald PA-C      furosemide  20 mg Intravenous Once Justin Arriaza MD      gabapentin  400 mg Oral BID Diana Mcdonald PA-C      heparin (porcine)  5,000 Units Subcutaneous Q8H CHRISTIE Diana Mcdonald PA-C      hydrALAZINE  5 mg Intravenous Q6H PRN Diana Mcdonald PA-C      hydrOXYzine HCL  50 mg Oral HS Diana Mcdonald PA-C      insulin lispro  1-5 Units Subcutaneous HS Diana Mcdonald PA-C      insulin lispro  1-6 Units Subcutaneous TID AC Diana Drew  DANIEL Mcdonald      levofloxacin  750 mg Oral Every Other Day Diana Mcdonald PA-C      mirtazapine  30 mg Oral HS Diana Mcdonald PA-C      NIFEdipine  30 mg Oral Daily Diana Mcdonald PA-C      NIFEdipine  90 mg Oral Daily Diana Mcdonald PA-C      oxyCODONE  5 mg Oral Q6H PRN Devin Tan MD      pravastatin  20 mg Oral Daily With Dinner Diana Mcdonald PA-C      tamsulosin  0.4 mg Oral Daily With Dinner Diana Mcdonald PA-C      vancomycin  1,000 mg Intravenous Daily PRN Tiffany Cobb MD          Today, Patient Was Seen By: Justin Arriaza MD    ** Please Note: Dictation voice to text software may have been used in the creation of this document. **

## 2024-07-14 NOTE — PROGRESS NOTES
Juan San is a 68 y.o. male who is currently ordered Vancomycin IV with management by the Pharmacy Consult service.  Relevant clinical data and objective / subjective history reviewed.  Vancomycin Assessment:  Indication and Goal AUC/Trough: Bone/joint infection (goal -600, trough >10)  Clinical Status: worsening  Micro:     Renal Function:  SCr: 1.55 mg/dL  CrCl: 51.8 mL/min  Renal replacement: Not on dialysis  Days of Therapy: 1 in hospital (patient is on 8 weeks vanco IV therapy outpatient)  Current Dose: 1000mg every 12 hours  Vancomycin Plan: patient is on 1000mg every 12 hours outpatient regimen of vancomycin for total treatment of 8 weeks (until per Aug 9 2024). Per patient, patient received AM dose of vancomycin 1000mg. Patient's Scr is not at baseline (baseline Scr ~1.00), it maybe acutely elevated and random level was drawn and resulted 22.5. Will hold tonight dose and transition to pulse dosing.  New Dosinmg daily PRN if level</=15  Next Level: 24 at 0600  Renal Function Monitoring: Daily BMP and UOP  Pharmacy will continue to follow closely for s/sx of nephrotoxicity, infusion reactions and appropriateness of therapy.  BMP and CBC will be ordered per protocol. We will continue to follow the patient’s culture results and clinical progress daily.    Bi Perez, Pharmacist, PharmD, BCPS

## 2024-07-14 NOTE — CONSULTS
[facility] Podiatry - Consultation    Patient Information:   Juan San 69 y.o. male MRN: [id number]  Unit/Bed#: -01 Encounter: [id number]  PCP: Sabra Magaña DO  Date of Admission:  7/13/2024  Date of Consultation: 07/14/24  Requesting Physician: Justin Arriaza MD      ASSESSMENT:    Juan San is a 69 y.o. male with:    Venous stasis ulcerations bilateral lower extremities  Type 2 diabetes with peripheral neuropathy  Lower extremity edema, LIGIA, prostatitis, discitis of lumbosacral region, hypertension    Plan:  Initial inpatient hospital consultation and bilateral lower extremity examination.  I personally viewed patient's medical records, ED visit note, slim H&P, pertinent blood work and imaging.  Venous stasis ulcerations x 2 on the right, 1 on the left, all 3 areas were irrigated with saline, single-layer Xeroform covered with 4 x 4, Kerlix and Ace wrap was applied, we appreciate nursing assistance with 3 times a week dressing changes, nursing orders were placed, discharge instructions were placed, patient will follow-up at wound management center.  Ace bandage for compression, frequent elevation, low-sodium diet, proper protein intake was reviewed with patient as well as proper glycemic control.  I also reviewed proper diabetic footcare, shoe gear and daily foot inspection.  Thank you for consultation, we will gladly follow along and see patient weekly, if any acute needs arise, please call us immediately.  Patient is stable for discharge from a wound care/podiatric perspective.  Remainder of care per primary team.    SUBJECTIVE    History of Present Illness:    Juan San is a 69 y.o. male with past medical history of type 2 diabetes not on insulin, hypertension, hyperlipidemia, prior lumbar fusion complicated by discitis maintained on long-term IV vancomycin who presented to ED yesterday with worsening lower extremity edema, blisters/wounds.  He reports edema has been worsening over  the last 1 to 2 weeks.  He states he popped his blisters with a sterile needle and they refilled.  Patient was seen by PCP initialized on HCTZ without improvement in symptoms.  He denies any nausea, vomiting, fever, chills, shortness of breath.  Podiatry has been consulted for wound care of bilateral legs.       Review of Systems:    Constitutional: Negative.    HENT: Negative.    Eyes: Negative.    Respiratory: Negative.    Cardiovascular: Negative.    Gastrointestinal: Negative.    Musculoskeletal: Negative  Skin: Wounds bilateral legs  Neurological: Negative  Psych: Negative.     Past Medical and Surgical History:     Past Medical History:   Diagnosis Date    Anxiety     Arthritis     Cancer (HCC)     Chronic back pain     Depression     Diabetes mellitus (HCC)     Hypertension     Liver disease     Mitral valve prolapse     Peripheral neuropathy     Neuropathy    PONV (postoperative nausea and vomiting)     Thyroid disease        Past Surgical History:   Procedure Laterality Date    COLONOSCOPY      INCISION AND DRAINAGE OF WOUND Left 08/12/2022    Procedure: INCISION AND DRAINAGE (I&D) EXTREMITY;  Surgeon: Moustapha Cote DPM;  Location:  MAIN OR;  Service: Podiatry    IR BIOPSY SPINE  1/30/2024    IR BIOPSY SPINE  2/1/2024    IR PICC PLACEMENT SINGLE LUMEN  2/6/2024    JOINT REPLACEMENT Left 03/11/2022    Left TSA    ID ARTHRODESIS POSTERIOR/PSTLAT TQ 1NTRSPC LUMBAR Bilateral 04/11/2023    Procedure: L1-S1 navigated posterior decompression with instrumented fixation fusion;  Surgeon: James Moraes MD;  Location:  MAIN OR;  Service: Neurosurgery    THYROID SURGERY  2021    remove cancer       Meds/Allergies:      Medications Prior to Admission:     ACCU-CHEK FABIANO PLUS test strip    ACCU-CHEK FASTCLIX LANCETS MISC    acetaminophen (TYLENOL) 500 mg tablet    Azelastine HCl 137 MCG/SPRAY SOLN    celecoxib (CeleBREX) 100 mg capsule    clonazePAM (KlonoPIN) 1 mg tablet    finasteride (PROSCAR) 5 mg tablet     fluticasone (FLONASE) 50 mcg/act nasal spray    folic acid (FOLVITE) 1 mg tablet    gabapentin (NEURONTIN) 400 mg capsule    GNP Aspirin Low Dose 81 MG EC tablet    hydrOXYzine pamoate (VISTARIL) 50 mg capsule    Jardiance 10 MG TABS    ketoconazole (NIZORAL) 2 % shampoo    lidocaine (LIDODERM) 5 %    loratadine (CLARITIN) 10 mg tablet    losartan-hydrochlorothiazide (HYZAAR) 100-25 MG per tablet    lovastatin (MEVACOR) 20 mg tablet    metFORMIN (GLUCOPHAGE) 1000 MG tablet    methocarbamol (ROBAXIN) 750 mg tablet    metoprolol succinate (TOPROL-XL) 50 mg 24 hr tablet    mirtazapine (REMERON) 45 MG tablet    NIFEdipine (PROCARDIA XL) 30 mg 24 hr tablet    NIFEdipine (PROCARDIA XL) 90 mg 24 hr tablet    ondansetron (Zofran ODT) 4 mg disintegrating tablet    patient supplied medication    senna (SENOKOT) 8.6 mg    sertraline (ZOLOFT) 100 mg tablet    tamsulosin (FLOMAX) 0.4 mg    Vancomycin HCl 10 g SOLR    Allergies   Allergen Reactions    Abilify [Aripiprazole] Tremor     Shaking      Cephalexin Diarrhea    Molds & Smuts Allergic Rhinitis    Pregabalin Tremor     Lyrica - shaking feeling       Social History:     Marital Status: /Civil Union    Substance Use History:   Social History     Substance and Sexual Activity   Alcohol Use Yes     Social History     Tobacco Use   Smoking Status Some Days    Current packs/day: 0.00    Average packs/day: 0.3 packs/day for 5.9 years (1.5 ttl pk-yrs)    Types: Cigarettes    Start date: 1975    Last attempt to quit: 1981    Years since quittin.1   Smokeless Tobacco Never   Tobacco Comments    quit ; smokes when in pain as of 26     Social History     Substance and Sexual Activity   Drug Use Yes    Frequency: 7.0 times per week    Types: Marijuana    Comment: Medical Marijuana, takes for pain, daily, vape       Family History:    Family History   Problem Relation Age of Onset    No Known Problems Father     No Known Problems Mother     Colon cancer Neg  "Hx          OBJECTIVE:    Vitals:   Blood Pressure: 123/61 (07/13/24 2230)  Pulse: 72 (07/13/24 2230)  Temperature: 98.2 °F (36.8 °C) (07/13/24 2230)  Temp Source: Temporal (07/13/24 2230)  Respirations: 16 (07/13/24 2230)  Height: 5' 9\" (175.3 cm) (07/13/24 2230)  Weight - Scale: 84 kg (185 lb 3.2 oz) (07/14/24 0600)  SpO2: 98 % (07/13/24 1717)    Physical Exam    General Appearance: Alert, cooperative, no distress.  HEENT: Head normocephalic, atraumatic, without obvious abnormality.  Psychiatric: AAOx3  Lower Extremity:  Vascular:   Pedal pulses are present. CRT < 3 seconds at the digits. +2/4 edema noted at bilateral lower extremities. Pedal hair is absent. Skin temperature is WNL bilaterally.    Musculoskeletal:  MMT is 5/5 in all muscle compartments bilaterally. ROM at the 1st MPJ and ankle joint are WNL bilaterally with the leg extended. No Pain on palpation of BL LE. No gross deformities noted.     Dermatological:   Lower extremity wounds as noted below:    Wound 1 located anterior right lower extremity, measures approximately 3.5 cm x 3.5 cm x 0.1 cm  without sinus tracking or undermining. Wound bed appears eschar with no drainage.  Deepest tissue noted in base is subq. Malodor is not noticed. Wound edge appears Attached. Emely-wound skin appears intact. Probe to bone is negative . Signs of infection are not present at this time.     Wound 2 located dorsal right foot, measures approximately 4.5 cm x 4.5 cm x 0.1 cm  without sinus tracking or undermining. Wound bed appears eschar with no drainage.  Deepest tissue noted in base is subq. Malodor is not noticed. Wound edge appears Attached. Emely-wound skin appears intact. Probe to bone is negative . Signs of infection are not present at this time.     Wound 3 located anterior left foot, measures approximately 1.3 cm x 2.0 cm x 0.1 cm  without sinus tracking or undermining. Wound bed appears granular with no drainage.  Deepest tissue noted in base is subq. Malodor " "is not noticed. Wound edge appears Attached. Emely-wound skin appears intact. Probe to bone is negative . Signs of infection are not present at this time.     Neurological:  Gross sensation is absent. Protective sensation is absent. Patient Reports numbness and/or paresthesias.        Additional Data:     Lab Results: I have personally reviewed pertinent labs including:    Results from last 7 days   Lab Units 07/14/24  0443   WBC Thousand/uL 10.16   HEMOGLOBIN g/dL 9.4*   HEMATOCRIT % 30.6*   PLATELETS Thousands/uL 292   SEGS PCT % 68   LYMPHO PCT % 18   MONO PCT % 8   EOS PCT % 5     Results from last 7 days   Lab Units 07/14/24  0443   POTASSIUM mmol/L 4.0   CHLORIDE mmol/L 99   CO2 mmol/L 29   BUN mg/dL 19   CREATININE mg/dL 1.35*   CALCIUM mg/dL 9.1   ALK PHOS U/L 61   ALT U/L 5*   AST U/L 12*           Cultures: I have personally reviewed pertinent cultures including:              Imaging: I have personally reviewed pertinent films in PACS.  Pathology, and Other Studies: I have personally reviewed pertinent reports.        ** Please Note: Portions of the record may have been created with voice recognition software. Occasional wrong word or \"sound a like\" substitutions may have occurred due to the inherent limitations of voice recognition software. Read the chart carefully and recognize, using context, where substitutions have occurred. **   "

## 2024-07-14 NOTE — ASSESSMENT & PLAN NOTE
Cr baseline appears 0.8  Cr on admission 1.5  Suspect volume overload however notably patient on chronic NSAIDs + levaquin for prostatitis  Bladder scan + retention protocol  UA pending   Hold IVF given edema, trial IV lasix x 1  Hold ARB/HCTZ  Trend BMP  Renally dose medications

## 2024-07-14 NOTE — ASSESSMENT & PLAN NOTE
Continue home nifedipine  Holding home losartan-HCTZ due to renal function  IV hydralazine as needed

## 2024-07-14 NOTE — ASSESSMENT & PLAN NOTE
Previously admitted [facility] [place] June 2024, transferred to [facility] where patient was recently discharged from  Continue home vancomycin via PICC line through 8/10, then patient is to transition to doxycycline 100 mg twice daily  Continue home pain medication including gabapentin  Holding home NSAIDs

## 2024-07-14 NOTE — ASSESSMENT & PLAN NOTE
Appears volume overloaded with LE edema, CXR with pulmonary vascular congestion,   Not maintained on diuretics at baseline, recently started on HCTZ 1 week ago  Stable on RA  Will trial IV lasix 60 mg x 1, monitor UOP & renal function- addtl lasix today  Echo pending (none available per chart review)  Venous duplex pending - d dimer elevted

## 2024-07-14 NOTE — H&P
[facility]  H&P  Name: Juan San 68 y.o. male I MRN: [id number]  Unit/Bed#: -01 I Date of Admission: 7/13/2024   Date of Service: 7/13/2024 I Hospital Day: 0      Assessment & Plan   * Venous stasis ulcers (HCC)  Assessment & Plan  Presented due to bilateral lower extremity ulcers which began as thin-walled blisters which ruptured 1 to 2 days ago with serous fluid output, no purulence.  Notably with worsening lower extremity edema for the past 1 to 2 weeks.  Suspect worsening venous stasis ulcers in setting of LE edema  Not meeting SIRS criteria, clinically without purulence or s/sx of infection  Continue home PO levaquin and IV vanc   Podiatry consult/wound care  IV lasix x 1    Lower extremity edema  Assessment & Plan  Appears volume overloaded with LE edema, CXR with pulmonary vascular congestion,   Not maintained on diuretics at baseline, recently started on HCTZ 1 week ago  Stable on RA  Will trial IV lasix 60 mg x 1, monitor UOP & renal function  May require further diuresis in am   Echo pending (none available per chart review)  Venous duplex pending     LIGIA (acute kidney injury) (HCC)  Assessment & Plan  Cr baseline appears 0.8  Cr on admission 1.5  Suspect volume overload however notably patient on chronic NSAIDs + levaquin for prostatitis  Bladder scan + retention protocol  UA pending   Hold IVF given edema, trial IV lasix x 1  Hold ARB/HCTZ  Trend BMP  Renally dose medications    Prostatitis  Assessment & Plan  Currently being treated for prostatitis with PO Levaquin since July 4th with [facility]  Continue PO Levaquin 500 mg daily through 8/10    Type 2 diabetes mellitus with hyperglycemia, without long-term current use of insulin (HCC)  Assessment & Plan  Lab Results   Component Value Date    HGBA1C 7.9 (H) 05/19/2024     Hold home PO metformin/jardiance  ISS + accucheks   Diabetic diet      Discitis of lumbosacral region  Assessment & Plan  Previously admitted  [facility] June 2024, transferred to [facility] where patient was recently discharged from  Continue home vancomycin via PICC line through 8/10, then patient is to transition to doxycycline 100 mg twice daily  Continue home pain medication including gabapentin  Holding home NSAIDs    Hypertension  Assessment & Plan  Continue home nifedipine  Holding home losartan-HCTZ due to renal function  IV hydralazine as needed           VTE Pharmacologic Prophylaxis: VTE Score: 3 Moderate Risk (Score 3-4) - Pharmacological DVT Prophylaxis Ordered: heparin.  Code Status: Level 1 - Full Code   Discussion with family: Updated  (wife) at bedside.    Anticipated Length of Stay: Patient will be admitted on an observation basis with an anticipated length of stay of less than 2 midnights secondary to IV diuresis .    Total Time Spent on Date of Encounter in care of patient: 60 mins. This time was spent on one or more of the following: performing physical exam; counseling and coordination of care; obtaining or reviewing history; documenting in the medical record; reviewing/ordering tests, medications or procedures; communicating with other healthcare professionals and discussing with patient's family/caregivers.    Chief Complaint: LE wounds, edema     History of Present Illness:  Juan San is a 68 y.o. male with a PMH of type 2 diabetes not on insulin, hypertension, hyperlipidemia, prior lumbar fusion complicated by discitis maintained on long-term IV vancomycin currently who presents with worsening lower extremity edema as well as bilateral lower extremity ulcers.  Patient reports edema has been worsening over the past 1 to 2 weeks.  He developed blisters several days ago which were thin-walled, with serous/watery fluid output after blisters ruptured.  He questioned whether blisters were secondary to edema versus sunburn blisters however no evidence of sunburn on exam.  Patient was seen by PCP and initiated on HCTZ  without improvement in symptoms.  He denies any fevers or chills, chest pain, cough, shortness of breath, nausea/vomiting, abdominal pain, diarrhea, urinary symptoms.  Denies any pain in bilateral lower extremities.  He is currently maintained on oral Levaquin for prostatitis from [facility] beginning on July 4 and is chronically on IV vancomycin for discitis via indwelling PICC line.     Review of Systems:  Review of Systems   Constitutional:  Negative for chills, fatigue and fever.   HENT:  Negative for congestion, rhinorrhea and sore throat.    Eyes:  Negative for visual disturbance.   Respiratory:  Negative for cough, chest tightness, shortness of breath and wheezing.    Cardiovascular:  Positive for leg swelling. Negative for chest pain and palpitations.   Gastrointestinal:  Negative for abdominal pain, constipation, diarrhea, nausea and vomiting.   Genitourinary:  Negative for difficulty urinating, dysuria and frequency.   Musculoskeletal:  Negative for arthralgias and myalgias.   Skin:  Positive for wound. Negative for rash.   Neurological:  Negative for dizziness, light-headedness and headaches.   All other systems reviewed and are negative.      Past Medical and Surgical History:   Past Medical History:   Diagnosis Date    Anxiety     Arthritis     Cancer (HCC)     Chronic back pain     Depression     Diabetes mellitus (HCC)     Hypertension     Liver disease     Mitral valve prolapse     Peripheral neuropathy     Neuropathy    PONV (postoperative nausea and vomiting)     Thyroid disease        Past Surgical History:   Procedure Laterality Date    COLONOSCOPY      INCISION AND DRAINAGE OF WOUND Left 08/12/2022    Procedure: INCISION AND DRAINAGE (I&D) EXTREMITY;  Surgeon: Moustapha Cote DPM;  Location:  MAIN OR;  Service: Podiatry    IR BIOPSY SPINE  1/30/2024    IR BIOPSY SPINE  2/1/2024    IR PICC PLACEMENT SINGLE LUMEN  2/6/2024    JOINT REPLACEMENT Left 03/11/2022    Left TSA    LA ARTHRODESIS  POSTERIOR/PSTLAT TQ 1NTRSPC LUMBAR Bilateral 04/11/2023    Procedure: L1-S1 navigated posterior decompression with instrumented fixation fusion;  Surgeon: James Moraes MD;  Location:  MAIN OR;  Service: Neurosurgery    THYROID SURGERY  2021    remove cancer       Meds/Allergies:  Prior to Admission medications    Medication Sig Start Date End Date Taking? Authorizing Provider   MONICA FABIANO PLUS test strip 4 (four) times a day 12/17/18   Historical Provider, MD ANDERSON FASTCLIX LANCETS MISC 4 (four) times a day 12/22/18   Historical Provider, MD   acetaminophen (TYLENOL) 500 mg tablet Take 2 tablets (1,000 mg total) by mouth every 8 (eight) hours 500 mg tablet 5/21/24   Janee Zuleta PA-C   Azelastine HCl 137 MCG/SPRAY SOLN Every 12 hours  Patient not taking: Reported on 5/29/2024    Historical Provider, MD   celecoxib (CeleBREX) 100 mg capsule Take 1 capsule (100 mg total) by mouth 2 (two) times a day Take with food 3/4/24   RUIZ Logan   clonazePAM (KlonoPIN) 1 mg tablet Take 1 mg by mouth 2 (two) times a day & 3rd pill PRN 10/24/22   Historical Provider, MD   finasteride (PROSCAR) 5 mg tablet Take 1 tablet (5 mg total) by mouth daily 7/18/23   Benedicto Rice MD   fluticasone (FLONASE) 50 mcg/act nasal spray 1 spray into each nostril daily as needed    Historical Provider, MD   folic acid (FOLVITE) 1 mg tablet Take 2,000 mcg by mouth daily 12/17/18   Historical Provider, MD   gabapentin (NEURONTIN) 400 mg capsule Take 1 capsule (400 mg total) by mouth 2 (two) times a day  Patient taking differently: Take 100 mg by mouth daily 3/4/24   RUIZ Logan   GNP Aspirin Low Dose 81 MG EC tablet Take 1 tablet (81 mg total) by mouth daily Do not start before April 25, 2023. 4/25/23   Katja Oshea PA-C   hydrOXYzine pamoate (VISTARIL) 50 mg capsule Take 50 mg by mouth 2 (two) times a day. Indications: Feeling Anxious    Historical Provider, MD   Jardiance 10 MG TABS Take 10 mg  by mouth every morning 7/20/22   Historical Provider, MD   ketoconazole (NIZORAL) 2 % shampoo Apply 1 application. topically daily Use as directed 10/10/18   Historical Provider, MD   lidocaine (LIDODERM) 5 % Apply 1 patch topically over 12 hours daily Remove & Discard patch within 12 hours or as directed by MD 2/27/24   Harinder Marin MD   loratadine (CLARITIN) 10 mg tablet Take 10 mg by mouth daily. per med [facility]  Indications: Hayfever    Historical Provider, MD   losartan-hydrochlorothiazide (HYZAAR) 100-25 MG per tablet Take 1 tablet by mouth every morning 12/17/18   Historical Provider, MD   lovastatin (MEVACOR) 20 mg tablet Take 20 mg by mouth every morning 12/17/18   Historical Provider, MD   metFORMIN (GLUCOPHAGE) 1000 MG tablet Take 1,000 mg by mouth 2 (two) times a day with meals  1/2/19   Historical Provider, MD   methocarbamol (ROBAXIN) 750 mg tablet Take 1 tablet (750 mg total) by mouth every 6 (six) hours as needed for muscle spasms 5/21/24   Janee Zuleta PA-C   metoprolol succinate (TOPROL-XL) 50 mg 24 hr tablet Take 50 mg by mouth 2 (two) times a day 4/3/24   Historical Provider, MD   mirtazapine (REMERON) 45 MG tablet Take 45 mg by mouth daily at bedtime 12/11/18   Historical Provider, MD   NIFEdipine (PROCARDIA XL) 30 mg 24 hr tablet TAKE 1 TABLET BY MOUTH EVERY DAY IN ADDITION TO 90MG FOR A TOTAL OF 120MG EVERY DAY 2/20/24   Historical Provider, MD   NIFEdipine (PROCARDIA XL) 90 mg 24 hr tablet Take 120 mg by mouth every morning Takes 90 mg and 30 mg =120 mg every AM 12/17/18   Historical Provider, MD   ondansetron (Zofran ODT) 4 mg disintegrating tablet Take 1 tablet (4 mg total) by mouth every 6 (six) hours as needed for nausea or vomiting 5/21/24   Janee Zuleta PA-C   patient supplied medication medical marijuana as needed per latest dci  Indications: .    Historical Provider, MD   senna (SENOKOT) 8.6 mg Take 1 tablet (8.6 mg total) by mouth 2 (two) times a day 2 tabs at  bedtime as needed for constipation per latest dci 5/21/24   Janee Zuleta PA-C   sertraline (ZOLOFT) 100 mg tablet Take 100 mg by mouth every morning    Historical Provider, MD   tamsulosin (FLOMAX) 0.4 mg Take 1 capsule (0.4 mg total) by mouth daily with dinner 7/18/23   Benedicto Rice MD   Vancomycin HCl 10 g SOLR  2/29/24   Historical Provider, MD   ARIPiprazole (ABILIFY) 30 mg tablet Take 30 mg by mouth daily at bedtime  Patient not taking: Reported on 7/29/2022 12/17/18 8/9/22  Historical Provider, MD   DIGOX 250 MCG tablet TAKE 1 TABLET BY MOUTH EVERY DAY BUT DO NOT TAKE ON SUNDAY OR WEDNESDAY  Patient not taking: Reported on 7/29/2022 12/17/18 8/9/22  Historical Provider, MD   glipiZIDE (GLUCOTROL XL) 10 mg 24 hr tablet Take 10 mg by mouth 2 (two) times a day. Indications: Type 2 Diabetes 9/8/22 9/8/22  Sabra Magaña DO   HUMULIN N 100 UNIT/ML subcutaneous injection INJECT 40 UNITS IN THE MORNING AND 6 UNITS in THE IN THE EVENING AS DIRECTED  Patient not taking: Reported on 8/9/2022 12/27/18 8/9/22  Historical Provider, MD   ondansetron (ZOFRAN) 4 mg tablet Take 1 tablet (4 mg total) by mouth every 6 (six) hours as needed for nausea or vomiting  Patient not taking: Reported on 7/29/2022 4/29/22 8/9/22  Stefany Urbina DO   propranolol (INDERAL LA) 160 mg Take 160 mg by mouth daily 12/17/18 9/16/22  Historical Provider, MD     I have reviewed home medications with patient personally.    Allergies:   Allergies   Allergen Reactions    Abilify [Aripiprazole] Tremor     Shaking      Cephalexin Diarrhea    Molds & Smuts Allergic Rhinitis    Pregabalin Tremor     Lyrica - shaking feeling       Social History:  Marital Status: /Civil Union   Occupation: Not assessed   Patient Pre-hospital Living Situation: Home, With spouse  Patient Pre-hospital Level of Mobility: walks with walker  Patient Pre-hospital Diet Restrictions: none   Substance Use History:   Social History     Substance and Sexual  Activity   Alcohol Use Yes     Social History     Tobacco Use   Smoking Status Some Days    Current packs/day: 0.00    Average packs/day: 0.3 packs/day for 5.9 years (1.5 ttl pk-yrs)    Types: Cigarettes    Start date: 1975    Last attempt to quit: 1981    Years since quittin.1   Smokeless Tobacco Never   Tobacco Comments    quit ; smokes when in pain as of 26     Social History     Substance and Sexual Activity   Drug Use Yes    Frequency: 7.0 times per week    Types: Marijuana    Comment: Medical Marijuana, takes for pain, daily, vape       Family History:  Family History   Problem Relation Age of Onset    No Known Problems Father     No Known Problems Mother     Colon cancer Neg Hx        Physical Exam:     Vitals:   Blood Pressure: 112/68 (24)  Pulse: 83 (24)  Temperature: 98.2 °F (36.8 °C) (24)  Temp Source: Temporal (24)  Respirations: 18 (24)  SpO2: 98 % (24)    Physical Exam  Vitals and nursing note reviewed.   Constitutional:       General: He is not in acute distress.     Appearance: He is well-developed. He is not ill-appearing.   HENT:      Head: Normocephalic and atraumatic.   Eyes:      General:         Right eye: No discharge.         Left eye: No discharge.      Extraocular Movements: Extraocular movements intact.      Conjunctiva/sclera: Conjunctivae normal.   Cardiovascular:      Rate and Rhythm: Normal rate and regular rhythm.      Heart sounds: No murmur heard.  Pulmonary:      Effort: Pulmonary effort is normal. No respiratory distress.      Breath sounds: Normal breath sounds. No wheezing or rhonchi.      Comments: Stable on room air, minimal crackles at bases.  No tachypnea or increased work of breathing.  Abdominal:      General: Bowel sounds are normal. There is no distension.      Palpations: Abdomen is soft.      Tenderness: There is no abdominal tenderness.   Musculoskeletal:      Cervical back: Neck  supple.      Right lower leg: Edema present.      Left lower leg: Edema present.      Comments: 2-3+ bilateral pitting edema with bilateral venous stasis ulcers.  Minimal surrounding erythema, no evidence of purulence or infection.   Skin:     General: Skin is warm and dry.      Capillary Refill: Capillary refill takes less than 2 seconds.      Findings: Lesion present.   Neurological:      General: No focal deficit present.      Mental Status: He is alert and oriented to person, place, and time. Mental status is at baseline.   Psychiatric:         Mood and Affect: Mood normal.         Behavior: Behavior normal.          Additional Data:     Lab Results:  Results from last 7 days   Lab Units 07/13/24  1757   WBC Thousand/uL 10.29*   HEMOGLOBIN g/dL 8.8*   HEMATOCRIT % 29.6*   PLATELETS Thousands/uL 282   SEGS PCT % 68   LYMPHO PCT % 18   MONO PCT % 8   EOS PCT % 4     Results from last 7 days   Lab Units 07/13/24  1757   SODIUM mmol/L 132*   POTASSIUM mmol/L 3.9   CHLORIDE mmol/L 99   CO2 mmol/L 27   BUN mg/dL 20   CREATININE mg/dL 1.55*   ANION GAP mmol/L 6   CALCIUM mg/dL 8.6   ALBUMIN g/dL 3.7   TOTAL BILIRUBIN mg/dL 0.25   ALK PHOS U/L 60   ALT U/L 7   AST U/L 13   GLUCOSE RANDOM mg/dL 132         Results from last 7 days   Lab Units 07/13/24 2012   POC GLUCOSE mg/dl 91     Lab Results   Component Value Date    HGBA1C 7.9 (H) 05/19/2024    HGBA1C 8.2 (H) 01/24/2024    HGBA1C 6.3 (H) 03/14/2023           Lines/Drains:  Invasive Devices       Peripherally Inserted Central Catheter Line  Duration             PICC Line 06/03/24 Right Brachial 40 days              Peripheral Intravenous Line  Duration             Peripheral IV 07/13/24 Left Antecubital <1 day                    Central Line:  Goal for removal: N/A - Chronic PICC           Imaging: Reviewed radiology reports from this admission including: chest xray  XR chest 1 view portable   ED Interpretation by Stefany Urbina DO (07/13 1802)   No acute  abnormalities as interpreted by me independently         Final Result by Mery Mckee MD (07/13 1829)      Mild pulmonary venous congestion.            Workstation performed: [id number]          VAS VENOUS DUPLEX - LOWER LIMB BILATERAL    (Results Pending)       EKG and Other Studies Reviewed on Admission:   EKG: No EKG obtained.    ** Please Note: This note has been constructed using a voice recognition system. **

## 2024-07-14 NOTE — UTILIZATION REVIEW
Initial Clinical Review      OBS order 7/13 1850 converted to IP on 7/14 1442 for treatment of venous stasis ulcers     Admission: Date/Time/Statement:   Admission Orders (From admission, onward)       Ordered        07/14/24 1442  INPATIENT ADMISSION  Once            07/13/24 1850  Place in Observation  Once                           INPATIENT ADMISSION     Standing Status:   Standing     Number of Occurrences:   1     Order Specific Question:   Level of Care     Answer:   Med Surg [16]     Order Specific Question:   Estimated length of stay     Answer:   More than 2 Midnights     Order Specific Question:   Certification     Answer:   I certify that inpatient services are medically necessary for this patient for a duration of greater than two midnights. See H&P and MD Progress Notes for additional information about the patient's course of treatment.     ED Arrival Information       Expected   -    Arrival   7/13/2024 17:03    Acuity   Urgent              Means of arrival   Walk-In    Escorted by   Spouse    Service   Hospitalist    Admission type   Emergency              Arrival complaint   Blisters on rt/lt  ft             Chief Complaint   Patient presents with    Wound Check     Patient presents to ED for open wounds to bilateral feet after laying in the sun on Tuesday.  Patient seen at PCP and was told the blisters were consistent with too much time in the sun.    Patient has a history of diabetes.    Patient currently receiving Vancomycin through his PICC line post back surgery with infection.        Initial Presentation: 69 y.o. male to ED from home w/  PMH of type 2 diabetes not on insulin, hypertension, hyperlipidemia, prior lumbar fusion complicated by discitis maintained on long-term IV vancomycin currently who presents with worsening lower extremity edema as well as bilateral lower extremity ulcers.  Patient reports edema has been worsening over the past 1 to 2 weeks.  He developed blisters several days  ago which were thin-walled, with serous/watery fluid output after blisters ruptured.   saw PCP and started on HCTZ w/o improvement .   maintained on oral Levaquin for prostatitis from [facility] beginning on July 4 and is chronically on IV vancomycin for discitis via indwelling PICC line. Appears volume overloaded w/ LE edema , CXR w/ pulm vascular congestion ,  . CR 1.5 , baseline 0.8 . Admitted OBS status w/ LE edema, LIGIA , venous stasis ulcers . Plan to cont po levaquin  and iv vanco , podiatry consult , wound care , IV lasix x1, monitor UOP , renal function , echo , venous duplex . Ua pending , hold ARB/HCTZ , trend BMP . DM SSI and monitor . Hold home po metformin /jardiance. HTN iv hydralazine prn .     Anticipated Length of Stay/Certification Statement:  Patient will be admitted on an observation basis with an anticipated length of stay of less than 2 midnights secondary to IV diuresis .     7/14 Podiatry Consult   Venous stasis ulcerations bilateral lower extremities .   Venous stasis ulcerations x 2 on the right, 1 on the left, all 3 areas were irrigated with saline, single-layer Xeroform covered with 4 x 4, Kerlix and Ace wrap was applied, we appreciate nursing assistance with 3 times a week dressing changes , f/u wound mngt . Ace bandage for compression . Low Na diet .     7/14 IM Note   LE edema , hold IVF and trial IV lasix x1. -1775 ml output last 24 hrs . Podiatry consulted for wounds . Cont home po levaquin . D-dimer elevated , venous duplex pending . Echo pending     Date: 7/15  Day 3: Has surpassed a 2nd midnight with active treatments and services. Echo completed EF 65 % . DC to home       ED Triage Vitals [07/13/24 1717]   Temperature Pulse Respirations Blood Pressure SpO2 Pain Score   98.3 °F (36.8 °C) 83 18 112/68 98 % No Pain     Weight (last 2 days)       Date/Time Weight    07/15/24 0920 78.9 (174)    07/15/24 0600 79 (174.2)    07/14/24 0600 84 (185.2)    07/13/24 2230 89.1 (196.43)             Vital Signs (last 3 days)       Date/Time Temp Pulse Resp BP SpO2 O2 Device Patient Position - Orthostatic VS Yovanny Coma Scale Score Pain    07/15/24 0920 -- 72 -- 123/61 -- -- -- -- --    07/15/24 0809 -- -- -- 158/79 -- -- -- -- --    07/15/24 0800 -- -- -- -- -- -- -- 15 --    07/15/24 0707 97.3 °F (36.3 °C) -- -- 159/75 -- -- Sitting -- No Pain    07/15/24 0148 -- -- -- -- -- -- -- -- 10 - Worst Possible Pain    07/14/24 2319 -- -- -- -- -- -- -- -- 8    07/14/24 2300 -- -- -- -- -- -- -- 15 --    07/14/24 2036 97.5 °F (36.4 °C) -- -- -- -- -- -- -- --    07/14/24 1631 -- -- -- -- -- -- -- -- 10 - Worst Possible Pain    07/14/24 1552 -- -- -- -- -- -- -- -- 10 - Worst Possible Pain    07/14/24 1511 97.7 °F (36.5 °C) -- -- -- -- -- -- -- --    07/14/24 0930 -- -- -- -- -- -- -- -- 10 - Worst Possible Pain    07/14/24 0845 -- -- -- -- 97 % None (Room air) -- 15 5    07/14/24 0434 -- -- -- -- -- -- -- -- 9 07/14/24 0110 -- -- -- -- -- -- -- -- 9 07/13/24 2230 98.2 °F (36.8 °C) 72 16 123/61 -- -- -- -- --    07/13/24 2214 -- -- -- -- -- -- -- -- 9 07/13/24 2008 -- -- -- -- -- -- -- -- No Pain    07/13/24 1948 -- -- -- -- -- -- -- 15 No Pain    07/13/24 1940 98.2 °F (36.8 °C) -- -- -- -- None (Room air) Lying -- --    07/13/24 1845 -- -- -- -- -- None (Room air) -- 15 --    07/13/24 1717 98.3 °F (36.8 °C) 83 18 112/68 98 % None (Room air) Sitting -- No Pain              Pertinent Labs/Diagnostic Test Results:   Radiology:  XR chest 1 view portable   ED Interpretation by Stefany Urbina DO (07/13 1802)   No acute abnormalities as interpreted by me independently         Final Interpretation by Mery Mckee MD (07/13 1829)      Mild pulmonary venous congestion.            Workstation performed: [id number]          VAS VENOUS DUPLEX - LOWER LIMB BILATERAL    (Results Pending)     Cardiology:  Echo complete w/ contrast if indicated   Final Result by Rodney Kauffman MD (07/15 4936)         Left Ventricle: Left ventricular cavity size is normal. Wall thickness    is normal. The left ventricular ejection fraction is 65%. Systolic    function is normal. Wall motion is normal. Diastolic function is mildly    abnormal, consistent with grade I (abnormal) relaxation.     Mitral Valve: There is mild annular calcification. There is trace    regurgitation.     Tricuspid Valve: There is mild regurgitation.     Pulmonary Artery: The pulmonary artery systolic pressure is normal.           GI:  No orders to display           Results from last 7 days   Lab Units 07/15/24  0404 07/14/24  0443 07/13/24  1757   WBC Thousand/uL 8.55 10.16 10.29*   HEMOGLOBIN g/dL 11.3* 9.4* 8.8*   HEMATOCRIT % 37.4 30.6* 29.6*   PLATELETS Thousands/uL 264 292 282   TOTAL NEUT ABS Thousands/µL 5.92 6.96 7.06         Results from last 7 days   Lab Units 07/15/24  0404 07/14/24 0443 07/13/24  1757 07/10/24  0900   SODIUM mmol/L 139 136 132* 138   POTASSIUM mmol/L 3.5 4.0 3.9 4.7   CHLORIDE mmol/L 96 99 99 99*   CO2 mmol/L 34* 29 27 28   ANION GAP mmol/L 9 8 6 11   BUN mg/dL 15 19 20 20   CREATININE mg/dL 1.16 1.35* 1.55* 1.11   EGFR ml/min/1.73sq m 64 53 45 73   CALCIUM mg/dL 9.3 9.1 8.6 9.2     Results from last 7 days   Lab Units 07/14/24 0443 07/13/24  1757 07/10/24  0900   AST U/L 12* 13 15   ALT U/L 5* 7 9*   ALK PHOS U/L 61 60 80   TOTAL PROTEIN g/dL 6.4 6.6 6.7   ALBUMIN g/dL 3.7 3.7 4.2   TOTAL BILIRUBIN mg/dL 0.32 0.25 0.3     Results from last 7 days   Lab Units 07/15/24  1035 07/15/24  0704 07/14/24  2142 07/14/24  1558 07/14/24  1110 07/14/24  0701 07/13/24 2012   POC GLUCOSE mg/dl 120 151* 91 107 169* 118 91     Results from last 7 days   Lab Units 07/15/24  0404 07/14/24 0443 07/13/24  1757 07/10/24  0900   GLUCOSE RANDOM mg/dL 105 162* 132 120*       Results from last 7 days   Lab Units 07/13/24  1757   BNP pg/mL 117*     Results from last 7 days   Lab Units 07/13/24  1757   FERRITIN ng/mL 8*   IRON SATURATION % 15    IRON ug/dL 46*   TIBC ug/dL 303     Results from last 7 days   Lab Units 07/13/24 2031   CLARITY UA  Clear   COLOR UA  Yellow   SPEC GRAV UA  1.010   PH UA  5.5   GLUCOSE UA mg/dl 1000 (1%)*   KETONES UA mg/dl Negative   BLOOD UA  Negative   PROTEIN UA mg/dl Negative   NITRITE UA  Negative   BILIRUBIN UA  Negative   UROBILINOGEN UA (BE) mg/dl <2.0   LEUKOCYTES UA  Large*   WBC UA /hpf 10-20*   RBC UA /hpf None Seen   BACTERIA UA /hpf Occasional   EPITHELIAL CELLS WET PREP /hpf None Seen       Results from last 7 days   Lab Units 07/15/24  0404 07/14/24  0443 07/13/24 2209   VANCOMYCIN RM ug/mL 13.6 21.8* 22.5*       ED Treatment-Medication Administration from 07/13/2024 1703 to 07/13/2024 1933         Date/Time Order Dose Route Action     07/13/2024 1920 cefTRIAXone (ROCEPHIN) IVPB (premix in dextrose) 1,000 mg 50 mL 1,000 mg Intravenous New Bag            Past Medical History:   Diagnosis Date    Anxiety     Arthritis     Cancer (HCC)     Chronic back pain     Depression     Diabetes mellitus (HCC)     Hypertension     Liver disease     Mitral valve prolapse     Peripheral neuropathy     Neuropathy    PONV (postoperative nausea and vomiting)     Thyroid disease      Present on Admission:   Type 2 diabetes mellitus with hyperglycemia, without long-term current use of insulin (HCC)   Discitis of lumbosacral region   Hypertension      Admitting Diagnosis: Cellulitis [L03.90]  Leg ulcer (HCC) [L97.909]  Visit for wound check [Z51.89]  Bilateral lower extremity edema [R60.0]  Age/Sex: 69 y.o. male  Admission Orders:  Scheduled Medications:  clonazePAM, 1 mg, Oral, BID  finasteride, 5 mg, Oral, Daily  folic acid, 2,000 mcg, Oral, Daily  gabapentin, 400 mg, Oral, BID  heparin (porcine), 5,000 Units, Subcutaneous, Q8H CHRISTIE  hydrOXYzine HCL, 50 mg, Oral, HS  insulin lispro, 1-5 Units, Subcutaneous, HS  insulin lispro, 1-6 Units, Subcutaneous, TID AC  levofloxacin, 500 mg, Oral, Q24H  mirtazapine, 30 mg, Oral,  HS  NIFEdipine, 30 mg, Oral, Daily  NIFEdipine, 90 mg, Oral, Daily  pravastatin, 20 mg, Oral, Daily With Dinner  tamsulosin, 0.4 mg, Oral, Daily With Dinner  vancomycin, 750 mg, Intravenous, Q12H      Continuous IV Infusions:     PRN Meds:  acetaminophen, 650 mg, Oral, Q6H PRN  hydrALAZINE, 5 mg, Intravenous, Q6H PRN  oxyCODONE, 5 mg, Oral, Q6H PRN  vancomycin, 1,000 mg, Intravenous, Daily PRN    Fingerstick ac and hs   PT OT eval   Bladder scan   Weights   I&O   Up and OOB       IP CONSULT TO PHARMACY  IP CONSULT TO PODIATRY    Network Utilization Review Department  ATTENTION: Please call with any questions or concerns to [phone] and carefully listen to the prompts so that you are directed to the right person. All voicemails are confidential.   For Discharge needs, contact Care Management DC Support Team at [phone] opt. 2  Send all requests for admission clinical reviews, approved or denied determinations and any other requests to dedicated fax number below belonging to the [place] where the patient is receiving treatment. List of dedicated fax numbers for the Facilities:  FACILITY NAME UR FAX NUMBER   ADMISSION DENIALS (Administrative/Medical Necessity) [phone]   DISCHARGE SUPPORT TEAM (NETWORK) [phone]   PARENT CHILD HEALTH (Maternity/NICU/Pediatrics) [phone]   [facility] [phone]   [facility] [phone]   [facility] [phone]   [facility] [phone]   [facility] [phone]   [facility] [phone]   [facility] [phone]   [facility] [phone]   [facility] [phone]   [facility] [phone]   [facility] [phone]   [facility]  [place] [phone]

## 2024-07-14 NOTE — DISCHARGE INSTR - OTHER ORDERS
Irrigate all leg/foot wounds with saline, single-layer Xeroform cover with 4 x 4's, wrap legs with Kerlix and 6 inch Ace wrap from toes to below knee for compression.  Please do dressing 3 times a week.

## 2024-07-14 NOTE — ASSESSMENT & PLAN NOTE
Previously admitted [place] June 2024, transferred to [facility] where patient was recently discharged from  Continue home vancomycin via PICC line through 8/10, then patient is to transition to doxycycline 100 mg twice daily  Continue home pain medication including gabapentin  Holding home NSAIDs

## 2024-07-15 ENCOUNTER — APPOINTMENT (INPATIENT)
Dept: NON INVASIVE DIAGNOSTICS | Facility: HOSPITAL | Age: 69
DRG: 683 | End: 2024-07-15
Payer: COMMERCIAL

## 2024-07-15 ENCOUNTER — HOME HEALTH ADMISSION (OUTPATIENT)
Dept: HOME HEALTH SERVICES | Facility: HOME HEALTHCARE | Age: 69
End: 2024-07-15
Payer: COMMERCIAL

## 2024-07-15 VITALS
OXYGEN SATURATION: 98 % | HEART RATE: 71 BPM | BODY MASS INDEX: 25.77 KG/M2 | TEMPERATURE: 97.5 F | HEIGHT: 69 IN | SYSTOLIC BLOOD PRESSURE: 135 MMHG | DIASTOLIC BLOOD PRESSURE: 67 MMHG | WEIGHT: 174 LBS | RESPIRATION RATE: 18 BRPM

## 2024-07-15 LAB
ANION GAP SERPL CALCULATED.3IONS-SCNC: 9 MMOL/L (ref 4–13)
AORTIC ROOT: 2.8 CM
APICAL FOUR CHAMBER EJECTION FRACTION: 69 %
BASOPHILS # BLD AUTO: 0.06 THOUSANDS/ÂΜL (ref 0–0.1)
BASOPHILS NFR BLD AUTO: 1 % (ref 0–1)
BSA FOR ECHO PROCEDURE: 1.95 M2
BUN SERPL-MCNC: 15 MG/DL (ref 5–25)
CALCIUM SERPL-MCNC: 9.3 MG/DL (ref 8.4–10.2)
CHLORIDE SERPL-SCNC: 96 MMOL/L (ref 96–108)
CO2 SERPL-SCNC: 34 MMOL/L (ref 21–32)
CREAT SERPL-MCNC: 1.16 MG/DL (ref 0.6–1.3)
DOP CALC LVOT AREA: 3.14 CM2
DOP CALC LVOT DIAMETER: 2 CM
E WAVE DECELERATION TIME: 274 MS
E/A RATIO: 0.76
EOSINOPHIL # BLD AUTO: 0.27 THOUSAND/ÂΜL (ref 0–0.61)
EOSINOPHIL NFR BLD AUTO: 3 % (ref 0–6)
ERYTHROCYTE [DISTWIDTH] IN BLOOD BY AUTOMATED COUNT: 19.4 % (ref 11.6–15.1)
FRACTIONAL SHORTENING: 34 (ref 28–44)
GFR SERPL CREATININE-BSD FRML MDRD: 64 ML/MIN/1.73SQ M
GLUCOSE SERPL-MCNC: 105 MG/DL (ref 65–140)
GLUCOSE SERPL-MCNC: 120 MG/DL (ref 65–140)
GLUCOSE SERPL-MCNC: 151 MG/DL (ref 65–140)
GLUCOSE SERPL-MCNC: 153 MG/DL (ref 65–140)
HCT VFR BLD AUTO: 37.4 % (ref 36.5–49.3)
HGB BLD-MCNC: 11.3 G/DL (ref 12–17)
IMM GRANULOCYTES # BLD AUTO: 0.04 THOUSAND/UL (ref 0–0.2)
IMM GRANULOCYTES NFR BLD AUTO: 1 % (ref 0–2)
INTERVENTRICULAR SEPTUM IN DIASTOLE (PARASTERNAL SHORT AXIS VIEW): 0.9 CM
INTERVENTRICULAR SEPTUM: 0.9 CM (ref 0.6–1.1)
LAAS-AP2: 19 CM2
LAAS-AP4: 20.4 CM2
LEFT ATRIUM AREA SYSTOLE SINGLE PLANE A4C: 19.9 CM2
LEFT ATRIUM SIZE: 4 CM
LEFT ATRIUM VOLUME (MOD BIPLANE): 56 ML
LEFT ATRIUM VOLUME INDEX (MOD BIPLANE): 28.7 ML/M2
LEFT INTERNAL DIMENSION IN SYSTOLE: 3.1 CM (ref 2.1–4)
LEFT VENTRICULAR INTERNAL DIMENSION IN DIASTOLE: 4.7 CM (ref 3.5–6)
LEFT VENTRICULAR POSTERIOR WALL IN END DIASTOLE: 0.9 CM
LEFT VENTRICULAR STROKE VOLUME: 65 ML
LVSV (TEICH): 65 ML
LYMPHOCYTES # BLD AUTO: 1.52 THOUSANDS/ÂΜL (ref 0.6–4.47)
LYMPHOCYTES NFR BLD AUTO: 18 % (ref 14–44)
MCH RBC QN AUTO: 24.1 PG (ref 26.8–34.3)
MCHC RBC AUTO-ENTMCNC: 30.2 G/DL (ref 31.4–37.4)
MCV RBC AUTO: 80 FL (ref 82–98)
MONOCYTES # BLD AUTO: 0.74 THOUSAND/ÂΜL (ref 0.17–1.22)
MONOCYTES NFR BLD AUTO: 9 % (ref 4–12)
MV E'TISSUE VEL-SEP: 9 CM/S
MV PEAK A VEL: 1.02 M/S
MV PEAK E VEL: 78 CM/S
MV STENOSIS PRESSURE HALF TIME: 80 MS
MV VALVE AREA P 1/2 METHOD: 2.75
NEUTROPHILS # BLD AUTO: 5.92 THOUSANDS/ÂΜL (ref 1.85–7.62)
NEUTS SEG NFR BLD AUTO: 68 % (ref 43–75)
NRBC BLD AUTO-RTO: 0 /100 WBCS
PLATELET # BLD AUTO: 264 THOUSANDS/UL (ref 149–390)
PMV BLD AUTO: 9.7 FL (ref 8.9–12.7)
POTASSIUM SERPL-SCNC: 3.5 MMOL/L (ref 3.5–5.3)
RBC # BLD AUTO: 4.68 MILLION/UL (ref 3.88–5.62)
RIGHT ATRIUM AREA SYSTOLE A4C: 16.7 CM2
RIGHT VENTRICLE ID DIMENSION: 3.2 CM
SL CV LEFT ATRIUM LENGTH A2C: 6.3 CM
SL CV LV EF: 65
SL CV PED ECHO LEFT VENTRICLE DIASTOLIC VOLUME (MOD BIPLANE) 2D: 104 ML
SL CV PED ECHO LEFT VENTRICLE SYSTOLIC VOLUME (MOD BIPLANE) 2D: 39 ML
SODIUM SERPL-SCNC: 139 MMOL/L (ref 135–147)
TR MAX PG: 30 MMHG
TR PEAK VELOCITY: 2.8 M/S
TRICUSPID ANNULAR PLANE SYSTOLIC EXCURSION: 3.1 CM
TRICUSPID VALVE PEAK REGURGITATION VELOCITY: 2.75 M/S
VANCOMYCIN SERPL-MCNC: 13.6 UG/ML (ref 10–20)
WBC # BLD AUTO: 8.55 THOUSAND/UL (ref 4.31–10.16)

## 2024-07-15 PROCEDURE — 93970 EXTREMITY STUDY: CPT | Performed by: SURGERY

## 2024-07-15 PROCEDURE — 99239 HOSP IP/OBS DSCHRG MGMT >30: CPT | Performed by: INTERNAL MEDICINE

## 2024-07-15 PROCEDURE — 80048 BASIC METABOLIC PNL TOTAL CA: CPT | Performed by: INTERNAL MEDICINE

## 2024-07-15 PROCEDURE — 82948 REAGENT STRIP/BLOOD GLUCOSE: CPT

## 2024-07-15 PROCEDURE — 80202 ASSAY OF VANCOMYCIN: CPT | Performed by: INTERNAL MEDICINE

## 2024-07-15 PROCEDURE — 93306 TTE W/DOPPLER COMPLETE: CPT

## 2024-07-15 PROCEDURE — 93306 TTE W/DOPPLER COMPLETE: CPT | Performed by: INTERNAL MEDICINE

## 2024-07-15 PROCEDURE — 85025 COMPLETE CBC W/AUTO DIFF WBC: CPT | Performed by: INTERNAL MEDICINE

## 2024-07-15 PROCEDURE — 93970 EXTREMITY STUDY: CPT

## 2024-07-15 RX ORDER — FUROSEMIDE 20 MG/1
20 TABLET ORAL DAILY PRN
Qty: 30 TABLET | Refills: 0 | Status: SHIPPED | OUTPATIENT
Start: 2024-07-15 | End: 2024-08-14

## 2024-07-15 RX ORDER — GABAPENTIN 100 MG/1
100 CAPSULE ORAL DAILY
Qty: 30 CAPSULE | Refills: 0 | Status: SHIPPED | OUTPATIENT
Start: 2024-07-15 | End: 2024-08-14

## 2024-07-15 RX ORDER — LEVOFLOXACIN 500 MG/1
500 TABLET, FILM COATED ORAL DAILY
COMMUNITY
Start: 2024-07-04 | End: 2024-08-12

## 2024-07-15 RX ORDER — VANCOMYCIN HYDROCHLORIDE 750 MG/150ML
750 INJECTION, SOLUTION INTRAVENOUS EVERY 12 HOURS
Status: DISCONTINUED | OUTPATIENT
Start: 2024-07-15 | End: 2024-07-15 | Stop reason: HOSPADM

## 2024-07-15 RX ORDER — LEVOFLOXACIN 250 MG/1
500 TABLET, FILM COATED ORAL EVERY 24 HOURS
Status: DISCONTINUED | OUTPATIENT
Start: 2024-07-15 | End: 2024-07-15 | Stop reason: HOSPADM

## 2024-07-15 RX ORDER — HYDROCHLOROTHIAZIDE 25 MG/1
25 TABLET ORAL EVERY MORNING
COMMUNITY
Start: 2024-07-11 | End: 2024-07-15

## 2024-07-15 RX ADMIN — OXYCODONE HYDROCHLORIDE 5 MG: 5 TABLET ORAL at 01:48

## 2024-07-15 RX ADMIN — FOLIC ACID 2000 MCG: 1 TABLET ORAL at 08:01

## 2024-07-15 RX ADMIN — OXYCODONE HYDROCHLORIDE 5 MG: 5 TABLET ORAL at 16:42

## 2024-07-15 RX ADMIN — GABAPENTIN 400 MG: 400 CAPSULE ORAL at 17:45

## 2024-07-15 RX ADMIN — METHOCARBAMOL TABLETS 500 MG: 500 TABLET, COATED ORAL at 01:48

## 2024-07-15 RX ADMIN — NIFEDIPINE 90 MG: 30 TABLET, EXTENDED RELEASE ORAL at 08:00

## 2024-07-15 RX ADMIN — INSULIN LISPRO 1 UNITS: 100 INJECTION, SOLUTION INTRAVENOUS; SUBCUTANEOUS at 07:58

## 2024-07-15 RX ADMIN — ACETAMINOPHEN 650 MG: 325 TABLET, FILM COATED ORAL at 11:38

## 2024-07-15 RX ADMIN — FINASTERIDE 5 MG: 5 TABLET, FILM COATED ORAL at 08:01

## 2024-07-15 RX ADMIN — HEPARIN SODIUM 5000 UNITS: 5000 INJECTION INTRAVENOUS; SUBCUTANEOUS at 05:42

## 2024-07-15 RX ADMIN — GABAPENTIN 400 MG: 400 CAPSULE ORAL at 08:01

## 2024-07-15 RX ADMIN — OXYCODONE HYDROCHLORIDE 5 MG: 5 TABLET ORAL at 07:54

## 2024-07-15 RX ADMIN — INSULIN LISPRO 1 UNITS: 100 INJECTION, SOLUTION INTRAVENOUS; SUBCUTANEOUS at 16:46

## 2024-07-15 RX ADMIN — TAMSULOSIN HYDROCHLORIDE 0.4 MG: 0.4 CAPSULE ORAL at 16:42

## 2024-07-15 RX ADMIN — VANCOMYCIN HYDROCHLORIDE 750 MG: 750 INJECTION, SOLUTION INTRAVENOUS at 05:42

## 2024-07-15 RX ADMIN — CLONAZEPAM 1 MG: 1 TABLET ORAL at 08:00

## 2024-07-15 RX ADMIN — VANCOMYCIN HYDROCHLORIDE 750 MG: 750 INJECTION, SOLUTION INTRAVENOUS at 17:45

## 2024-07-15 RX ADMIN — LEVOFLOXACIN 500 MG: 250 TABLET, FILM COATED ORAL at 08:05

## 2024-07-15 RX ADMIN — METHOCARBAMOL TABLETS 500 MG: 500 TABLET, COATED ORAL at 11:38

## 2024-07-15 RX ADMIN — CLONAZEPAM 1 MG: 1 TABLET ORAL at 17:45

## 2024-07-15 RX ADMIN — NIFEDIPINE 30 MG: 30 TABLET, EXTENDED RELEASE ORAL at 08:15

## 2024-07-15 RX ADMIN — PRAVASTATIN SODIUM 20 MG: 20 TABLET ORAL at 16:42

## 2024-07-15 NOTE — DISCHARGE SUMMARY
[facility]  Discharge- Juan San 1955, 69 y.o. male MRN: [id number]  Unit/Bed#: [facility] Encounter: [id number]  Primary Care Provider: Sabra Magaña DO   Date and time admitted to hospital: 7/13/2024  5:24 PM    * Venous stasis ulcers (HCC)  Assessment & Plan  Presented due to bilateral lower extremity ulcers which began as thin-walled blisters which ruptured 1 to 2 days ago with serous fluid output, no purulence.  Notably with worsening lower extremity edema for the past 1 to 2 weeks.  Suspect worsening venous stasis ulcers in setting of LE edema  Not meeting SIRS criteria, clinically without purulence or s/sx of infection  Continue home PO levaquin and IV vanc   Dietary consulted, appreciate recommendations.  Continue wound care outpatient.    LIGIA (acute kidney injury) (HCC)  Assessment & Plan  Recent Labs     07/13/24  1757 07/14/24  0443 07/15/24  0404   CREATININE 1.55* 1.35* 1.16   EGFR 45 53 64     Estimated Creatinine Clearance: 61.8 mL/min (by C-G formula based on SCr of 1.16 mg/dL).   Cr baseline appears 0.8  Cr on admission 1.5  Creatinine improved with IV Lasix.  Stable  Follow-up with family doctor  Lasix as needed    Lower extremity edema  Assessment & Plan  Appears volume overloaded with LE edema, CXR with pulmonary vascular congestion,   Not maintained on diuretics at baseline, recently started on HCTZ 1 week ago  Stable on RA  Echo showed good ejection fraction, diastolic dysfunction 1.  Lower extremity ultrasound showed no DVT  Patient's lower extremity edema much improved.  Discharged on Lasix as needed    Prostatitis  Assessment & Plan  Currently being treated for prostatitis with PO Levaquin since July 4th with [facility]  Continue PO Levaquin 500 mg daily through 8/10    Type 2 diabetes mellitus with hyperglycemia, without long-term current use of insulin ([facility])  Assessment & Plan  Lab Results   Component Value Date    HGBA1C 7.9 (H) 05/19/2024      Continue Home medications      Discitis of lumbosacral region  Assessment & Plan  Previously admitted [facility] June 2024, transferred to [facility] where patient was recently discharged from  Continue home vancomycin via PICC line through 8/10, then patient is to transition to doxycycline 100 mg twice daily  Continue home pain medication including gabapentin      Hypertension  Assessment & Plan  Continue home pressure medication except HCTZ  Take Lasix as needed        Medical Problems       Resolved Problems  Date Reviewed: 7/13/2024   None       Discharging Physician / Practitioner: Leah He MD  PCP: Sabra Magaña DO  Admission Date:   Admission Orders (From admission, onward)       Ordered        07/14/24 1442  INPATIENT ADMISSION  Once            07/13/24 1850  Place in Observation  Once                          Discharge Date: 07/15/24    Consultations During Hospital Stay:  Podiatry    Procedures Performed:   None    Significant Findings / Test Results:   XR chest 1 view portable  Result Date: 7/13/2024  Impression: Mild pulmonary venous congestion.     Incidental Findings:   None    Test Results Pending at Discharge (will require follow up):   none     Outpatient Tests Requested:  none    Complications:  none    Reason for Admission: Lower extremity wounds and edema    Hospital Course:   Juan San is a 69 y.o. male patient with past medical history of type 2 diabetes not on insulin, hypertension, hyperlipidemia, prior lumbar fusion complicated by discitis maintained on long-term IV vancomycin who originally presented to the hospital on 7/13/2024 due to lower extremity edema worsened in the past 1 to 2 weeks along with blisters filled with water bilateral feet.  And received 2 times IV Lasix, lower extremity edema much improved.  She consulted for his legs, recommended wound care.  Patient already on antibiotics, his legs did not appear to be infected.    Eager to be discharged home  "today.  Recommended Lasix 20 mg daily as needed.  Follow-up with family doctor in 1 week.      Please see above list of diagnoses and related plan for additional information.     Condition at Discharge: good    Discharge Day Visit / Exam:   Subjective:  no complaints   Vitals: Blood Pressure: 135/67 (07/15/24 1527)  Pulse: 71 (07/15/24 1527)  Temperature: 97.5 °F (36.4 °C) (07/15/24 1527)  Temp Source: Temporal (07/15/24 1527)  Respirations: 18 (07/15/24 1527)  Height: 5' 9\" (175.3 cm) (07/15/24 0920)  Weight - Scale: 78.9 kg (174 lb) (07/15/24 0920)  SpO2: 98 % (07/15/24 1527)  Exam:   Physical Exam  Vitals and nursing note reviewed.   Constitutional:       General: He is not in acute distress.     Appearance: He is not diaphoretic.   HENT:      Head: Normocephalic.   Eyes:      General: No scleral icterus.        Right eye: No discharge.         Left eye: No discharge.   Cardiovascular:      Rate and Rhythm: Normal rate and regular rhythm.      Heart sounds: No murmur heard.  Pulmonary:      Effort: Pulmonary effort is normal. No respiratory distress.      Breath sounds: Normal breath sounds. No wheezing, rhonchi or rales.   Abdominal:      General: There is no distension.      Palpations: Abdomen is soft.      Tenderness: There is no abdominal tenderness. There is no guarding or rebound.   Musculoskeletal:      Cervical back: Normal range of motion.      Right lower leg: No edema.      Left lower leg: No edema.   Skin:     General: Skin is warm.      Coloration: Skin is pale.   Neurological:      Mental Status: He is alert and oriented to person, place, and time.   Psychiatric:         Mood and Affect: Mood normal.         Behavior: Behavior normal.         Thought Content: Thought content normal.         Judgment: Judgment normal.          Discussion with Family: Updated  (wife) via phone.    Discharge instructions/Information to patient and family:   See after visit summary for information " provided to patient and family.      Provisions for Follow-Up Care:  See after visit summary for information related to follow-up care and any pertinent home health orders.      Mobility at time of Discharge:   Basic Mobility Inpatient Raw Score: 21  JH-HLM Goal: 6: Walk 10 steps or more  JH-HLM Achieved: 6: Walk 10 steps or more  HLM Goal achieved. Continue to encourage appropriate mobility.     Disposition:   Home with VNA Services (Reminder: Complete face to face encounter)    Planned Readmission: no     Discharge Statement:  I spent 38 minutes discharging the patient. This time was spent on the day of discharge. I had direct contact with the patient on the day of discharge. Greater than 50% of the total time was spent examining patient, answering all patient questions, arranging and discussing plan of care with patient as well as directly providing post-discharge instructions.  Additional time then spent on discharge activities.    Discharge Medications:  See after visit summary for reconciled discharge medications provided to patient and/or family.      **Please Note: This note may have been constructed using a voice recognition system**

## 2024-07-15 NOTE — PHYSICAL THERAPY NOTE
PHYSICAL THERAPY SCREEN NOTE        Patient Name: Juan San  Today's Date: 7/15/2024     07/15/24 1152   PT Last Visit   PT Visit Date 07/15/24   Note Type   Note type Screen   Additional Comments PT orders received, chart review performed. Spoke w/ Dr. He, patient w/ [facility], is ambulating around the room, pt w/ no mobility concerns. Pt w/ no acute PT needs, will Will DC PT.       Lea Johnson, PT, DPT

## 2024-07-15 NOTE — DISCHARGE INSTR - AVS FIRST PAGE
Stop taking hydrochlorothiazide.  Take Lasix as needed     The rest of your medications, continue as before.  No changes     Follow-up with your family doctor in 1 week

## 2024-07-15 NOTE — OCCUPATIONAL THERAPY NOTE
Occupational Therapy Screen     Patient Name: Juan San  Today's Date: 7/15/2024     07/15/24 1151   OT Last Visit   OT Visit Date 07/15/24   Note Type   Note type Screen   Additional Comments OT orders received and chart reviewed. Spoke to MD Lynn who states pt is currently independent and states no therapy needs.  Recommend pt continue to be OOB for meals, ambulation to/from BR, perform self care tasks, and mobility in hallway with nursing.  At this time, OT recommendations at time of discharge are return home at [facility]. No acute OT needs identified at this time; please re-consult if OT needs arise during remainder of hospital stay.     Diana Wen MS, OTR/L

## 2024-07-15 NOTE — PROGRESS NOTES
Juan San is a 69 y.o. male who is currently ordered Vancomycin IV with management by the Pharmacy Consult service.  Relevant clinical data and objective / subjective history reviewed.  Vancomycin Assessment:  Indication and Goal AUC/Trough: Bone/joint infection (goal -600, trough >10)  Clinical Status: stable  Micro:   UC Pending  Renal Function:  SCr: 1.16 mg/dL  CrCl: 61.8 mL/min  Renal replacement: Not on dialysis  Days of Therapy:  3 in hospital (patient is on 8 weeks vanco IV therapy outpatient until Aug 9 2024)   Current Dose: 1000mg daily PRN if level </=15  Vancomycin Plan: random level resulted 13.6 and Scr has improved and at baseline. Thus, will transition to scheduled dosing as below.  New Dosinmg every 12 hours  Estimated AUC: 465 mcg*hr/mL  Estimated Trough: 14.5 mcg/mL  Next Level: 24 at 0530  Renal Function Monitoring: Daily BMP and UOP  Pharmacy will continue to follow closely for s/sx of nephrotoxicity, infusion reactions and appropriateness of therapy.  BMP and CBC will be ordered per protocol.     Also, patient is on levaquin 500mg daily until Aug 10 2024 per outpatient ID provider. We will continue to follow the patient’s culture results and clinical progress daily.    Bi Perez, Pharmacist, PharmD, BCPS

## 2024-07-15 NOTE — ASSESSMENT & PLAN NOTE
Previously admitted [place] June 2024, transferred to [facility] where patient was recently discharged from  Continue home vancomycin via PICC line through 8/10, then patient is to transition to doxycycline 100 mg twice daily  Continue home pain medication including gabapentin

## 2024-07-15 NOTE — ASSESSMENT & PLAN NOTE
Recent Labs     07/13/24  1757 07/14/24  0443 07/15/24  0404   CREATININE 1.55* 1.35* 1.16   EGFR 45 53 64     Estimated Creatinine Clearance: 61.8 mL/min (by C-G formula based on SCr of 1.16 mg/dL).   Cr baseline appears 0.8  Cr on admission 1.5  Creatinine improved with IV Lasix.  Stable  Follow-up with family doctor  Lasix as needed

## 2024-07-15 NOTE — UTILIZATION REVIEW
NOTIFICATION OF INPATIENT ADMISSION   AUTHORIZATION REQUEST   SERVICING FACILITY:   [place] [address]  Tax ID: [id number]  NPI: [id number] ATTENDING PROVIDER:  Attending Name and NPI#: Leah He Md [[id number]]  Address: [address] [place] [address]  Phone: [phone]   ADMISSION INFORMATION:  Place of Service: Inpatient [facility] ([place], [place])  Place of Service Code: 21  Inpatient Admission Date/Time: 7/14/24  2:42 PM  Discharge Date/Time: No discharge date for patient encounter.  Admitting Diagnosis Code/Description:  Cellulitis [L03.90]  Leg ulcer (HCC) [L97.909]  Visit for wound check [Z51.89]  Bilateral lower extremity edema [R60.0]     UTILIZATION REVIEW CONTACT:  Ashly Resendiz, Utilization   Network Utilization Review Department  Phone: [phone]  Fax: [phone]  Email: [email or web address]  Contact for approvals/pending authorizations, clinical reviews, and discharge.     PHYSICIAN ADVISORY SERVICES:  Medical Necessity Denial & Tkot-yh-Hkvf Review  Phone: [phone]  Fax: [phone]  Email: [email or web address]     DISCHARGE SUPPORT TEAM:  For Patients Discharge Needs & Updates  Phone: [phone] opt. 2 Fax: [phone]  Email: [email or web address]

## 2024-07-15 NOTE — CASE MANAGEMENT
Case Management Discharge Planning Note    Patient name Juan San  Location /-01 MRN [id number]  : 1955 Date 7/15/2024       Current Admission Date: 2024  Current Admission Diagnosis:Venous stasis ulcers (HCC)   Patient Active Problem List    Diagnosis Date Noted Date Diagnosed    Prostatitis 2024     Lower extremity edema 2024     Venous stasis ulcers (HCC) 2024     LIGIA (acute kidney injury) (HCC) 2024     Failure of joint fusion (HCC) 2024     Sepsis without acute organ dysfunction (HCC) 2024     Lactic acidosis 2024     Type 2 diabetes mellitus with hyperglycemia, without long-term current use of insulin (HCC) 2024     Foraminal stenosis of lumbar region 2024     Discitis of lumbosacral region 2024     Hypochromic microcytic anemia 10/10/2023     Acute on chronic bilateral low back pain with sciatica 10/09/2023     Anxiety and depression 10/09/2023     Hypertension 10/09/2023     Benign prostatic hyperplasia with urinary retention 2023     Scrotal pain 2023     Lower urinary tract symptoms 2023     Status post lumbar spinal fusion 2023     Hyponatremia 2023     Gallstones 2023     Anemia 2023     Urinary retention 2023     PONV (postoperative nausea and vomiting)      Medical marijuana use      Chronic bilateral low back pain without sciatica 2023     Lumbar radiculopathy      Chronic pain syndrome 2022     Liver disease 08/10/2022     Bronchitis, mucopurulent recurrent (HCC) 2022     Cirrhosis, alcoholic (HCC) 2022     Diabetic peripheral neuropathy (HCC) 2022     Herniated nucleus pulposus of lumbosacral region 2022     Thyroid cancer (HCC) 2021     Alcoholism in remission (HCC) 2021     Benzodiazepine dependence (HCC) 2021     Bilateral adhesive capsulitis of shoulders 2021     DM (diabetes mellitus) (HCC)  07/30/2021     Hypercholesterolemia 07/30/2021     Lumbar spondylosis 07/30/2021       LOS (days): 1  Geometric Mean LOS (GMLOS) (days): 3.1  Days to GMLOS:2.2     OBJECTIVE:  Risk of Unplanned Readmission Score: 35.19         Current admission status: Inpatient   Preferred Pharmacy:   [facility] [place] [address] [address]  Phone: [phone] Fax: [phone]    Primary Care Provider: Sabra Magaña DO    Primary Insurance: [employer] REP  Secondary [facility]: [facility]    DISCHARGE DETAILS:    Requested Home Health Care         Is the patient interested in HHC at discharge?: Yes  Home Health Discipline requested:: Nursing  Home Health Agency Name:: Other ([facility])  HHA External Referral Reason (only applicable if external HHA name selected): Patient has established relationship with provider  Home Health Follow-Up Provider:: PCP  Home Health Services Needed:: Administration of IV, IM or SC Medications  Homebound Criteria Met:: Requires the Assistance of Another Person for Safe Ambulation or to Leave the Home  Supporting Clincal Findings:: Limited Endurance    Other Referral/Resources/Interventions Provided:  Interventions: HHC  Referral Comments: Referral for WHITNEY sent to [facility] via [facility].    Additional Comments: Per chart review, pt is current with [facility] to assist with lab draws and dressing changes related to home IV ABX. Per provider, pt will be stable for discharge home today with no change to ABX plan that was in place prior to admission. CM attempted to call [facility] to inform of plan for discharge today however was on hold for 25 minutes without option to speak with anyone or leave a voicemail. CM sent referral to [facility] via [employer] informing of plan for discharge home today with resumption of ABX plan with no changes.

## 2024-07-15 NOTE — PLAN OF CARE
Problem: Potential for Falls  Goal: Patient will remain free of falls  Description: INTERVENTIONS:  - Educate patient/family on patient safety including physical limitations  - Instruct patient to call for assistance with activity   - Consult OT/PT to assist with strengthening/mobility   - Keep Call bell within reach  - Keep bed low and locked with side rails adjusted as appropriate  - Keep care items and personal belongings within reach  - Initiate and maintain comfort rounds  - Make Fall Risk Sign visible to staff  -- Apply yellow socks and bracelet for high fall risk patients  - Consider moving patient to room near nurses station  Outcome: Progressing     Problem: PAIN - ADULT  Goal: Verbalizes/displays adequate comfort level or baseline comfort level  Description: Interventions:  - Encourage patient to monitor pain and request assistance  - Assess pain using appropriate pain scale  - Administer analgesics based on type and severity of pain and evaluate response  - Implement non-pharmacological measures as appropriate and evaluate response  - Consider cultural and social influences on pain and pain management  - Notify physician/advanced practitioner if interventions unsuccessful or patient reports new pain  Outcome: Progressing     Problem: INFECTION - ADULT  Goal: Absence or prevention of progression during hospitalization  Description: INTERVENTIONS:  - Assess and monitor for signs and symptoms of infection  - Monitor lab/diagnostic results  - Monitor all insertion sites, i.e. indwelling lines, tubes, and drains  - Monitor endotracheal if appropriate and nasal secretions for changes in amount and color  - [place] appropriate cooling/warming therapies per order  - Administer medications as ordered  - Instruct and encourage patient and family to use good hand hygiene technique  - Identify and instruct in appropriate isolation precautions for identified infection/condition  Outcome: Progressing  Goal: Absence  of fever/infection during neutropenic period  Description: INTERVENTIONS:  - Monitor WBC    Outcome: Progressing     Problem: SAFETY ADULT  Goal: Patient will remain free of falls  Description: INTERVENTIONS:  - Educate patient/family on patient safety including physical limitations  - Instruct patient to call for assistance with activity   - Consult OT/PT to assist with strengthening/mobility   - Keep Call bell within reach  - Keep bed low and locked with side rails adjusted as appropriate  - Keep care items and personal belongings within reach  - Initiate and maintain comfort rounds  - Make Fall Risk Sign visible to staff  - Apply yellow socks and bracelet for high fall risk patients  - Consider moving patient to room near nurses station  Outcome: Progressing  Goal: Maintain or return to baseline ADL function  Description: INTERVENTIONS:  -  Assess patient's ability to carry out ADLs; assess patient's baseline for ADL function and identify physical deficits which impact ability to perform ADLs (bathing, care of mouth/teeth, toileting, grooming, dressing, etc.)  - Assess/evaluate cause of self-care deficits   - Assess range of motion  - Assess patient's mobility; develop plan if impaired  - Assess patient's need for assistive devices and provide as appropriate  - Encourage maximum independence but intervene and supervise when necessary  - Involve family in performance of ADLs  - Assess for home care needs following discharge   - Consider OT consult to assist with ADL evaluation and planning for discharge  - Provide patient education as appropriate  Outcome: Progressing  Goal: Maintains/Returns to pre admission functional level  Description: INTERVENTIONS:  - Perform AM-PAC 6 Click Basic Mobility/ Daily Activity assessment daily.  - Set and communicate daily mobility goal to care team and patient/family/caregiver.   - Collaborate with rehabilitation services on mobility goals if consulted  - Perform Range of Motion  2 times a day.  - Reposition patient every 2 hours.  - Dangle patient 2 times a day  - Stand patient 2 times a day  - Ambulate patient 2 times a day  - Out of bed to chair 2 times a day   - Out of bed for meals 2 times a day  - Out of bed for toileting  - Record patient progress and toleration of activity level   Outcome: Progressing

## 2024-07-15 NOTE — ASSESSMENT & PLAN NOTE
Appears volume overloaded with LE edema, CXR with pulmonary vascular congestion,   Not maintained on diuretics at baseline, recently started on HCTZ 1 week ago  Stable on RA  Echo showed good ejection fraction, diastolic dysfunction 1.  Lower extremity ultrasound showed no DVT  Patient's lower extremity edema much improved.  Discharged on Lasix as needed

## 2024-07-15 NOTE — CASE MANAGEMENT
Case Management Discharge Planning Note    Patient name Juan San  Location /-01 MRN [id number]  : 1955 Date 7/15/2024       Current Admission Date: 2024  Current Admission Diagnosis:Venous stasis ulcers (HCC)   Patient Active Problem List    Diagnosis Date Noted Date Diagnosed    Prostatitis 2024     Lower extremity edema 2024     Venous stasis ulcers (HCC) 2024     LIGIA (acute kidney injury) (HCC) 2024     Failure of joint fusion (HCC) 2024     Sepsis without acute organ dysfunction (HCC) 2024     Lactic acidosis 2024     Type 2 diabetes mellitus with hyperglycemia, without long-term current use of insulin (HCC) 2024     Foraminal stenosis of lumbar region 2024     Discitis of lumbosacral region 2024     Hypochromic microcytic anemia 10/10/2023     Acute on chronic bilateral low back pain with sciatica 10/09/2023     Anxiety and depression 10/09/2023     Hypertension 10/09/2023     Benign prostatic hyperplasia with urinary retention 2023     Scrotal pain 2023     Lower urinary tract symptoms 2023     Status post lumbar spinal fusion 2023     Hyponatremia 2023     Gallstones 2023     Anemia 2023     Urinary retention 2023     PONV (postoperative nausea and vomiting)      Medical marijuana use      Chronic bilateral low back pain without sciatica 2023     Lumbar radiculopathy      Chronic pain syndrome 2022     Liver disease 08/10/2022     Bronchitis, mucopurulent recurrent (HCC) 2022     Cirrhosis, alcoholic (HCC) 2022     Diabetic peripheral neuropathy (HCC) 2022     Herniated nucleus pulposus of lumbosacral region 2022     Thyroid cancer (HCC) 2021     Alcoholism in remission (HCC) 2021     Benzodiazepine dependence (HCC) 2021     Bilateral adhesive capsulitis of shoulders 2021     DM (diabetes mellitus) (HCC)  07/30/2021     Hypercholesterolemia 07/30/2021     Lumbar spondylosis 07/30/2021       LOS (days): 1  Geometric Mean LOS (GMLOS) (days): 3.1  Days to GMLOS:2.1     OBJECTIVE:  Risk of Unplanned Readmission Score: 35.19         Current admission status: Inpatient   Preferred Pharmacy:   [facility] [address] [address]  Phone: [phone] Fax: [phone]    Primary Care Provider: Sabra Magaña DO    Primary Insurance: [employer] REP  Secondary Insurance: [facility]    DISCHARGE DETAILS:    Additional Comments: Per [facility] communication, pt is only current with [facility] infusion and not [facility] home health. CM called [facility] and confirmed same with their staff, pt's home address is not within the service area for their home health so they are not able to accept for wound care. [facility] sent to  [facility] as [facility] was open with [facility] in the past,  [facility] able to accept for [facility] for wound care. CM left  for pt's wife informing of same, Dr. He to update agency on home health order.

## 2024-07-15 NOTE — ASSESSMENT & PLAN NOTE
Presented due to bilateral lower extremity ulcers which began as thin-walled blisters which ruptured 1 to 2 days ago with serous fluid output, no purulence.  Notably with worsening lower extremity edema for the past 1 to 2 weeks.  Suspect worsening venous stasis ulcers in setting of LE edema  Not meeting SIRS criteria, clinically without purulence or s/sx of infection  Continue home PO levaquin and IV vanc   Dietary consulted, appreciate recommendations.  Continue wound care outpatient.

## 2024-07-16 LAB — BACTERIA UR CULT: ABNORMAL

## 2024-07-16 NOTE — UTILIZATION REVIEW
NOTIFICATION OF ADMISSION DISCHARGE   This is a Notification of Discharge from [facility]. Please be advised that this patient has been discharge from our facility. Below you will find the admission and discharge date and time including the patient’s disposition.   UTILIZATION REVIEW CONTACT:  Isabela Bills  Utilization   Network Utilization Review Department  Phone: [phone] x carefully listen to the prompts. All voicemails are confidential.  Email: [email or web address]     ADMISSION INFORMATION  PRESENTATION DATE: 7/13/2024  5:24 PM  OBERVATION ADMISSION DATE: 07/13/2024 1850  INPATIENT ADMISSION DATE: 7/14/24  2:42 PM   DISCHARGE DATE: 7/15/2024  7:53 PM   DISPOSITION:Home with Home Health Care    Network Utilization Review Department  ATTENTION: Please call with any questions or concerns to [phone] and carefully listen to the prompts so that you are directed to the right person. All voicemails are confidential.   For Discharge needs, contact Care Management DC Support Team at [phone] opt. 2  Send all requests for admission clinical reviews, approved or denied determinations and any other requests to dedicated fax number below belonging to the campus where the patient is receiving treatment. List of dedicated fax numbers for the Facilities:  FACILITY NAME UR FAX NUMBER   ADMISSION DENIALS (Administrative/Medical Necessity) [phone]   DISCHARGE SUPPORT TEAM ([facility]) [phone]   PARENT CHILD HEALTH (Maternity/NICU/Pediatrics) [phone]   [facility] [phone]   [facility] [phone]   [facility] [phone]   [facility] [phone]   [facility] [phone]   [facility] [phone]   [facility] [phone]   [facility]  [facility] [phone]   [facility] [phone]   Critical access hospital [phone]   [facility] [phone]   [facility] [phone]

## 2024-07-16 NOTE — CASE MANAGEMENT
Case Management Progress Note    Patient name Juan San  Location /-01 MRN [id number]  : 1955 Date 2024       LOS (days): 1  Geometric Mean LOS (GMLOS) (days): 3.1  Days to GMLOS:1.9        OBJECTIVE:        Current admission status: Inpatient  Preferred Pharmacy:   [facility] [address] [address]  Phone: [phone] Fax: [phone]    Primary Care Provider: Sabra Magaña DO    [facility]  Secondary Insurance: [facility]    PROGRESS NOTE:    Notification made to OP CM Handoff: [facility] OP CM regarding discharge planning and disposition.   Spoke with Yoly LERNER

## 2024-07-17 ENCOUNTER — HOME CARE VISIT (OUTPATIENT)
Dept: HOME HEALTH SERVICES | Facility: HOME HEALTHCARE | Age: 69
End: 2024-07-17
Payer: COMMERCIAL

## 2024-07-17 VITALS
DIASTOLIC BLOOD PRESSURE: 62 MMHG | TEMPERATURE: 99.7 F | SYSTOLIC BLOOD PRESSURE: 94 MMHG | RESPIRATION RATE: 18 BRPM | HEART RATE: 81 BPM | OXYGEN SATURATION: 94 %

## 2024-07-17 PROCEDURE — G0299 HHS/HOSPICE OF RN EA 15 MIN: HCPCS

## 2024-07-17 PROCEDURE — 400013 VN SOC

## 2024-07-17 RX ORDER — VANCOMYCIN HYDROCHLORIDE 1 G/20ML
1 INJECTION, POWDER, LYOPHILIZED, FOR SOLUTION INTRAVENOUS 2 TIMES DAILY
Qty: 46 EACH | Refills: 0 | Status: SHIPPED | OUTPATIENT
Start: 2024-07-17 | End: 2024-07-17

## 2024-07-17 NOTE — CASE MANAGEMENT
Case Management Progress Note    Patient name Juan San  Location /-01 MRN [id number]  : 1955 Date 2024       LOS (days): 1  Geometric Mean LOS (GMLOS) (days): 3.1  Days to GMLOS:1.9        OBJECTIVE:        Current admission status: Inpatient  Preferred Pharmacy:   [facility] [address] [address]  Phone: [phone] Fax: [phone]    Primary Care Provider: Sabra Magaña DO    Primary Insurance: [employer] REP  Secondary Insurance: [facility]    PROGRESS NOTE:    Message received from [facility] requesting a new script. YANN spoke to Vanessa [phone] who requested script be faxed to her at fax# [phone] for resumption of care.  Fax sent.

## 2024-07-18 ENCOUNTER — HOME CARE VISIT (OUTPATIENT)
Dept: HOME HEALTH SERVICES | Facility: HOME HEALTHCARE | Age: 69
End: 2024-07-18
Payer: COMMERCIAL

## 2024-07-18 PROCEDURE — 10330064 DRESSING, XEROFORM STR 5X9" (50/BX)"

## 2024-07-18 PROCEDURE — 10330064 TAPE, ADHSV TRANSPORE WHT 2 (6RL/BX 10B"

## 2024-07-18 PROCEDURE — 10330064 SPONGE, GAUZE 8PLY N/S 4X4" (200/PK 20P"

## 2024-07-18 PROCEDURE — 10330064 BANDAGE, CNFRM 4X4.1YDS N/S LF (12RL/BG"

## 2024-07-18 PROCEDURE — 10330064 BANDAGE, ELAS SLF-CLSR PREM N/S LF 4X5YD

## 2024-07-18 NOTE — CASE COMMUNICATION
TC from PCP office reporting tax not recieved please resend. Confirmed fax # is correct.  Back office please fax to PCP Sabra Magaña DO Fax: [phone]   Patient Juan San  [birth year]   Medication discrepancies or Major drug interactions: Missing the following medications: gabapentin, vibramycin, Flonase, aspirin, azelastine, ketonazole, lidocaine, loratadine, and sertraline.   Major Drug-Drug interactions as below. Simon novoa continuation of lexapro.  levofloxacin and escitalopram   Additive QT interval prolongation may occur during coadministration of moderate-risk QT-prolonging agents, escitalopram and levofloxacin.   levofloxacin and escitalopram   Additive QT interval prolongation may occur during coadministration of moderate-risk QT-prolonging agents, escitalopram and levofloxacin.   escitalopram, sertraline and mirtazapine   Additive seroto nergic effects may occur during coadministration of selective serotonin reuptake inhibitors (SSRIs) and mirtazapine, and the risk of developing serotonin syndrome may be increased.   Abnormal clinical findings: Hypotension 94/62 HR 81 Temp 99.7. Nonsymptomatic. Patient reports chronic fatigue SOB with moderate exertion 10-20 feet.   Would you like a BMP to follow up on LIGIA?   Patient wants physical therapy for deconditioning.   Patient  reports very poor short term memory and needs waiver aid for daily living and wife manages all medical needs. Wife reports patient has short term memory loss and is confused most of the time and has depression and anxiety. Patient reports being on medical marijuana vape.   Offered MSW to assist with advanced directives and patient refused stating already having an MSW. Patient reports unable to understand waiver aide due to language bar riroberto carlos.   TC to Dr. Ward to report on arrival patients wounds dry and open to air. Patient reports those were the instructions when leaving. Dr. Ward reports wounds should never be  left open to air to follow same wound care orders on AVS. Notified Dr. Ward wife agrees to learn wound care on Tuesdays 8 to 430 and Thursdays before 930 am RN to do wound care on Saturdays until wife independent then [facility] will discharge. (Patient reports  wife home on Saturdays after 1030 am.)   Response needed, please respond via Other: Phone [phone]   [facility] Admitted your patient to Home Health service with the following disciplines: SN and PT   Patient stated goals of care: wounds healed   Potential barriers to goal achievement: patients confusion   Primary focus of home health care:Wound   Anticipated visit pattern and next visit date: 2w1 3w2 or until wife independe nt in wound care then DC   Thank you for allowing us to participate in the care of your patient.   Tommy Nino

## 2024-07-18 NOTE — CASE COMMUNICATION
Back office please fax to PCP Sabra Magaña DO Fax: [phone]   Patient Juan San  [birth year]    Medication discrepancies or Major drug interactions:  Missing the following medications: gabapentin, vibramycin, Flonase, aspirin, azelastine, ketonazole, lidocaine, loratadine, and sertraline.   Major Drug-Drug interactions as below. Ok to continue?  levofloxacin and escitalopram  Additive QT interval prolongation may occur du ring coadministration of moderate-risk QT-prolonging agents, escitalopram and levofloxacin.  levofloxacin and escitalopram  Additive QT interval prolongation may occur during coadministration of moderate-risk QT-prolonging agents, escitalopram and levofloxacin.  escitalopram, sertraline and mirtazapine  Additive serotonergic effects may occur during coadministration of selective serotonin reuptake inhibitors (SSRIs) and mirtazapine, and  the risk of developing serotonin syndrome may be increased.     Abnormal clinical findings: Hypotension 94/62 HR 81 Temp 99.7. Nonsymptomatic. Patient reports chronic fatigue SOB with moderate exertion 10-20 feet.   Would you like a [facility] to follow up on LIGIA?  Patient wants physical therapy for deconditioning.  Patient reports very poor short term memory and needs waiver aid for daily living and wife manages all medical needs. Wife repor ts patient has short term memory loss and is confused most of the time and has depression and anxiety. Patient reports being on medical marijuana vape.   Offered MSW to assist with advanced directives and patient refused stating already having an MSW. Patient reports unable to understand waiver aide due to language barrier.   TC to Dr. Ward to report on arrival patients wounds dry and open to air. Patient reports those were the instru ctions when leaving. Dr. Ward reports wounds should never be left open to air to follow same wound care orders on AVS. Notified Dr. Ward wife agrees to learn wound care on   8 to 430 and Thursdays before 930 am RN to do wound care on Saturdays until wife independent then [facility] will discharge. (Patient reports wife home on Saturdays after 1030 am.)  Response needed, please respond via Other: Phone [phone]   [facility] Ad[facility] your patient to Home Health service with the following disciplines: SN and PT  Patient stated goals of care: wounds healed  Potential barriers to goal achievement: patients confusion  Primary focus of home health care:Wound  Anticipated visit pattern and next visit date: 2w1 3w2 or until wife independent in wound care then DC  Thank you for allowing us to participate in the care of your patient.      Tommy Nino

## 2024-07-18 NOTE — CASE COMMUNICATION
TC to [facility] care left VM to confirm fax received and being addressed by PCP. Also notified that discharging MD ok continuation of lexapro.

## 2024-07-18 NOTE — CASE COMMUNICATION
Ship to -Clinician     Ordering Leah Jimenez MD;     Wound 1 BLE venous stasus ulcers Full x         Frequency 3x/wk    Gauze 4x4 N/S 200 4x4s per unit  155614 -1   Gauze Luiza stretch roll 4inch n/s 12 rolls per unit  516116 -1  Transpore white  2in 130141 -1  Gauze Xeroform 5x9 967876 -1  4in Ace 592643 -4

## 2024-07-18 NOTE — CASE COMMUNICATION
Ship to Pt. Home    Ordering Leah Jimenez MD;     Wound 1 BLE venous stasus ulcers Full x         Frequency 3x/wk    Gauze Kerlix (fluff roll) 4inch sterile 357855 -12  Gauze Xeroform 5x9 006331 -2  6 in ACE - 8

## 2024-07-18 NOTE — CASE COMMUNICATION
In basket message from Tavno Allen,   Continue medications as before the admission. If he was taking lexapro, continue. Infectious disease started him on levoflox for infection, we just continued his meds. He should f/u with family doctor for blood work or any medication changes in the future. Thank you

## 2024-07-19 ENCOUNTER — HOME CARE VISIT (OUTPATIENT)
Dept: HOME HEALTH SERVICES | Facility: HOME HEALTHCARE | Age: 69
End: 2024-07-19
Payer: COMMERCIAL

## 2024-07-19 VITALS — HEART RATE: 71 BPM | OXYGEN SATURATION: 98 % | DIASTOLIC BLOOD PRESSURE: 76 MMHG | SYSTOLIC BLOOD PRESSURE: 136 MMHG

## 2024-07-19 PROCEDURE — G0151 HHCP-SERV OF PT,EA 15 MIN: HCPCS

## 2024-07-19 NOTE — UTILIZATION REVIEW
NOTIFICATION OF ADMISSION DISCHARGE   This is a Notification of Discharge from [facility]. Please be advised that this patient has been discharge from our facility. Below you will find the admission and discharge date and time including the patient’s disposition.   UTILIZATION REVIEW CONTACT:  Ashly Resendiz  Utilization   Network Utilization Review Department  Phone: [phone] x carefully listen to the prompts. All voicemails are confidential.  Email: [email or web address]     ADMISSION INFORMATION  PRESENTATION DATE: 7/13/2024  5:24 PM  OBERVATION ADMISSION DATE: 07/13/2024 1850  INPATIENT ADMISSION DATE: 7/14/24  2:42 PM   DISCHARGE DATE: 7/15/2024  7:53 PM   DISPOSITION:Home with Home Health Care    Network Utilization Review Department  ATTENTION: Please call with any questions or concerns to [phone] and carefully listen to the prompts so that you are directed to the right person. All voicemails are confidential.   For Discharge needs, contact Care Management DC Support Team at [phone] opt. 2  Send all requests for admission clinical reviews, approved or denied determinations and any other requests to dedicated fax number below belonging to the campus where the patient is receiving treatment. List of dedicated fax numbers for the Facilities:  FACILITY NAME UR FAX NUMBER   ADMISSION DENIALS (Administrative/Medical Necessity) [phone]   DISCHARGE SUPPORT TEAM ([facility]) [phone]   PARENT CHILD HEALTH (Maternity/NICU/Pediatrics) [phone]   [facility] [phone]   [facility] [phone]   [facility] [phone]   [facility] [phone]   [facility] [phone]   [facility] [phone]   [facility] [phone]   [facility]  [facility] [phone]   [facility] [phone]   [facility] [phone]   [facility] [phone]   [facility] [phone]        [facility]  Discharge- Juan Jaswinder 1955, 69 y.o. male MRN: [id number]  Unit/Bed#: -01 Encounter: [id number]  Primary Care Provider: Sabra Magaña DO   Date and time admitted to hospital: 7/13/2024  5:24 PM     * Venous stasis ulcers (HCC)  Assessment & Plan  Presented due to bilateral lower extremity ulcers which began as thin-walled blisters which ruptured 1 to 2 days ago with serous fluid output, no purulence.  Notably with worsening lower extremity edema for the past 1 to 2 weeks.  Suspect worsening venous stasis ulcers in setting of LE edema  Not meeting SIRS criteria, clinically without purulence or s/sx of infection  Continue home PO levaquin and IV vanc   Dietary consulted, appreciate recommendations.  Continue wound care outpatient.     LIGIA (acute kidney injury) (HCC)  Assessment & Plan        Recent Labs     07/13/24  1757 07/14/24  0443 07/15/24  0404   CREATININE 1.55* 1.35* 1.16   EGFR 45 53 64      Estimated Creatinine Clearance: 61.8 mL/min (by C-G formula based on SCr of 1.16 mg/dL).   Cr baseline appears 0.8  Cr on admission 1.5  Creatinine improved with IV Lasix.  Stable  Follow-up with family doctor  Lasix as needed     Lower extremity edema  Assessment & Plan  Appears volume overloaded with LE edema, CXR with pulmonary vascular congestion,   Not maintained on diuretics at baseline, recently started on HCTZ 1 week ago  Stable on RA  Echo showed good ejection fraction, diastolic dysfunction 1.  Lower extremity ultrasound showed no DVT  Patient's lower extremity edema much improved.  Discharged on Lasix as needed     Prostatitis  Assessment & Plan  Currently being treated for  prostatitis with PO Levaquin since July 4th with [facility]  Continue PO Levaquin 500 mg daily through 8/10     Type 2 diabetes mellitus with hyperglycemia, without long-term current use of insulin ([facility])  Assessment & Plan        Lab Results   Component Value Date     HGBA1C 7.9 (H) 05/19/2024      Continue Home medications        Discitis of lumbosacral region  Assessment & Plan  Previously admitted [facility] June 2024, transferred to [facility] where patient was recently discharged from  Continue home vancomycin via PICC line through 8/10, then patient is to transition to doxycycline 100 mg twice daily  Continue home pain medication including gabapentin        Hypertension  Assessment & Plan  Continue home pressure medication except HCTZ  Take Lasix as needed           Medical Problems         Resolved Problems  Date Reviewed: 7/13/2024   None         Discharging Physician / Practitioner: Leah He MD  PCP: Sabra Magaña DO  Admission Date:   Admission Orders (From admission, onward)          Ordered         07/14/24 1442   INPATIENT ADMISSION  Once             07/13/24 1850   Place in Observation  Once                               Discharge Date: 07/15/24     Consultations During Hospital Stay:  Podiatry     Procedures Performed:   None     Significant Findings / Test Results:   XR chest 1 view portable  Result Date: 7/13/2024  Impression: Mild pulmonary venous congestion.      Incidental Findings:   None     Test Results Pending at Discharge (will require follow up):   none     Outpatient Tests Requested:  none     Complications:  none     Reason for Admission: Lower extremity wounds and edema     Hospital Course:   Juan San is a 69 y.o. male patient with past medical history of type 2 diabetes not on insulin, hypertension, hyperlipidemia, prior lumbar fusion complicated by discitis maintained on long-term IV vancomycin who originally presented to the hospital on 7/13/2024 due to lower  "extremity edema worsened in the past 1 to 2 weeks along with blisters filled with water bilateral feet.  And received 2 times IV Lasix, lower extremity edema much improved.  She consulted for his legs, recommended wound care.  Patient already on antibiotics, his legs did not appear to be infected.     Eager to be discharged home today.  Recommended Lasix 20 mg daily as needed.  Follow-up with family doctor in 1 week.        Please see above list of diagnoses and related plan for additional information.      Condition at Discharge: good     Discharge Day Visit / Exam:   Subjective:  no complaints   Vitals: Blood Pressure: 135/67 (07/15/24 1527)  Pulse: 71 (07/15/24 1527)  Temperature: 97.5 °F (36.4 °C) (07/15/24 1527)  Temp Source: Temporal (07/15/24 1527)  Respirations: 18 (07/15/24 1527)  Height: 5' 9\" (175.3 cm) (07/15/24 0920)  Weight - Scale: 78.9 kg (174 lb) (07/15/24 0920)  SpO2: 98 % (07/15/24 1527)  Exam:   Physical Exam  Vitals and nursing note reviewed.   Constitutional:       General: He is not in acute distress.     Appearance: He is not diaphoretic.   HENT:      Head: Normocephalic.   Eyes:      General: No scleral icterus.        Right eye: No discharge.         Left eye: No discharge.   Cardiovascular:      Rate and Rhythm: Normal rate and regular rhythm.      Heart sounds: No murmur heard.  Pulmonary:      Effort: Pulmonary effort is normal. No respiratory distress.      Breath sounds: Normal breath sounds. No wheezing, rhonchi or rales.   Abdominal:      General: There is no distension.      Palpations: Abdomen is soft.      Tenderness: There is no abdominal tenderness. There is no guarding or rebound.   Musculoskeletal:      Cervical back: Normal range of motion.      Right lower leg: No edema.      Left lower leg: No edema.   Skin:     General: Skin is warm.      Coloration: Skin is pale.   Neurological:      Mental Status: He is alert and oriented to person, place, and time.   Psychiatric:        "  Mood and Affect: Mood normal.         Behavior: Behavior normal.         Thought Content: Thought content normal.         Judgment: Judgment normal.            Discussion with Family: Updated  (wife) via phone.     Discharge instructions/Information to patient and family:   See after visit summary for information provided to patient and family.       Provisions for Follow-Up Care:  See after visit summary for information related to follow-up care and any pertinent home health orders.       Mobility at time of Discharge:   Basic Mobility Inpatient Raw Score: 21  JH-HLM Goal: 6: Walk 10 steps or more  JH-HLM Achieved: 6: Walk 10 steps or more  HLM Goal achieved. Continue to encourage appropriate mobility.     Disposition:   Home with VNA Services (Reminder: Complete face to face encounter)     Planned Readmission: no     Discharge Statement:  I spent 38 minutes discharging the patient. This time was spent on the day of discharge. I had direct contact with the patient on the day of discharge. Greater than 50% of the total time was spent examining patient, answering all patient questions, arranging and discussing plan of care with patient as well as directly providing post-discharge instructions.  Additional time then spent on discharge activities.     Discharge Medications:  See after visit summary for reconciled discharge medications provided to patient and/or family.       **Please Note: This note may have been constructed using a voice recognition system**

## 2024-07-20 ENCOUNTER — HOME CARE VISIT (OUTPATIENT)
Dept: HOME HEALTH SERVICES | Facility: HOME HEALTHCARE | Age: 69
End: 2024-07-20
Payer: COMMERCIAL

## 2024-07-20 VITALS
HEART RATE: 68 BPM | RESPIRATION RATE: 18 BRPM | OXYGEN SATURATION: 97 % | WEIGHT: 175 LBS | TEMPERATURE: 97.9 F | SYSTOLIC BLOOD PRESSURE: 140 MMHG | DIASTOLIC BLOOD PRESSURE: 70 MMHG | BODY MASS INDEX: 25.84 KG/M2

## 2024-07-20 PROCEDURE — G0299 HHS/HOSPICE OF RN EA 15 MIN: HCPCS

## 2024-07-23 ENCOUNTER — HOME CARE VISIT (OUTPATIENT)
Dept: HOME HEALTH SERVICES | Facility: HOME HEALTHCARE | Age: 69
End: 2024-07-23
Payer: COMMERCIAL

## 2024-07-23 VITALS
OXYGEN SATURATION: 97 % | DIASTOLIC BLOOD PRESSURE: 70 MMHG | HEART RATE: 72 BPM | SYSTOLIC BLOOD PRESSURE: 120 MMHG | TEMPERATURE: 97.3 F | RESPIRATION RATE: 15 BRPM

## 2024-07-23 VITALS
BODY MASS INDEX: 26.83 KG/M2 | WEIGHT: 177 LBS | DIASTOLIC BLOOD PRESSURE: 68 MMHG | SYSTOLIC BLOOD PRESSURE: 122 MMHG | RESPIRATION RATE: 18 BRPM | TEMPERATURE: 97.4 F | OXYGEN SATURATION: 97 % | HEART RATE: 75 BPM | HEIGHT: 68 IN

## 2024-07-23 PROCEDURE — G0180 MD CERTIFICATION HHA PATIENT: HCPCS | Performed by: INTERNAL MEDICINE

## 2024-07-23 PROCEDURE — G0299 HHS/HOSPICE OF RN EA 15 MIN: HCPCS

## 2024-07-23 PROCEDURE — G0157 HHC PT ASSISTANT EA 15: HCPCS

## 2024-07-24 ENCOUNTER — HOSPITAL ENCOUNTER (OUTPATIENT)
Facility: MEDICAL CENTER | Age: 69
Discharge: HOME | End: 2024-07-24
Payer: COMMERCIAL

## 2024-07-24 ENCOUNTER — OFFICE VISIT (OUTPATIENT)
Dept: WOUND CARE | Facility: HOSPITAL | Age: 69
End: 2024-07-24
Payer: COMMERCIAL

## 2024-07-24 VITALS
HEART RATE: 76 BPM | RESPIRATION RATE: 16 BRPM | HEIGHT: 68 IN | WEIGHT: 180 LBS | DIASTOLIC BLOOD PRESSURE: 70 MMHG | BODY MASS INDEX: 27.28 KG/M2 | SYSTOLIC BLOOD PRESSURE: 120 MMHG | TEMPERATURE: 98.9 F

## 2024-07-24 DIAGNOSIS — I87.311 IDIOPATHIC CHRONIC VENOUS HYPERTENSION OF RIGHT LOWER EXTREMITY WITH ULCER (HCC): Primary | ICD-10-CM

## 2024-07-24 DIAGNOSIS — L97.919 IDIOPATHIC CHRONIC VENOUS HYPERTENSION OF RIGHT LOWER EXTREMITY WITH ULCER (HCC): Primary | ICD-10-CM

## 2024-07-24 DIAGNOSIS — E11.42 DIABETIC PERIPHERAL NEUROPATHY (HCC): ICD-10-CM

## 2024-07-24 DIAGNOSIS — N41.2 PROSTATIC ABSCESS: ICD-10-CM

## 2024-07-24 PROCEDURE — 11042 DBRDMT SUBQ TIS 1ST 20SQCM/<: CPT | Performed by: PODIATRIST

## 2024-07-24 PROCEDURE — 72197 MRI PELVIS W/O & W/DYE: CPT

## 2024-07-24 PROCEDURE — 99213 OFFICE O/P EST LOW 20 MIN: CPT | Performed by: PODIATRIST

## 2024-07-24 PROCEDURE — 11045 DBRDMT SUBQ TISS EACH ADDL: CPT | Performed by: PODIATRIST

## 2024-07-24 PROCEDURE — 76377 3D RENDER W/INTRP POSTPROCES: CPT

## 2024-07-24 PROCEDURE — A9585 GADOBUTROL INJECTION: HCPCS | Performed by: RADIOLOGY

## 2024-07-24 RX ORDER — GADOBUTROL 604.72 MG/ML
8 INJECTION INTRAVENOUS
Status: COMPLETED | OUTPATIENT
Start: 2024-07-24 | End: 2024-07-24

## 2024-07-24 RX ADMIN — GADOBUTROL 8 ML: 604.72 INJECTION INTRAVENOUS at 14:00

## 2024-07-24 NOTE — PROGRESS NOTES
Patient ID: Juan San is a 69 y.o. male Date of Birth 1955       Chief Complaint   Patient presents with    New Patient Visit     Right foot wounds x2        Allergies:  Abilify [aripiprazole], Cephalexin, Molds & smuts, and Pregabalin    Diagnosis:  1. Idiopathic chronic venous hypertension of right lower extremity with ulcer (HCC)  -     Wound cleansing and dressings; Future  -     Wound Procedure Treatment  2. Diabetic peripheral neuropathy (HCC)  -     Wound cleansing and dressings; Future  -     Wound Procedure Treatment     Diagnosis ICD-10-CM Associated Orders   1. Idiopathic chronic venous hypertension of right lower extremity with ulcer (HCC)  I87.311 Wound cleansing and dressings    L97.919 Wound Procedure Treatment      2. Diabetic peripheral neuropathy (HCC)  E11.42 Wound cleansing and dressings     Wound Procedure Treatment           Assessment & Plan:  Venous stasis ulceration right lower leg and right dorsal foot complicated by type 2 diabetes with peripheral neuropathy, both wounds were debrided through subcuticular tissues and dressed with Dermagran gauze dry dressing, patient's wife was educated on how to change dressing 3 times a week as he has recently been discharged from visiting nurses.  She will use prophase to cleanse the wounds.  All supplies were ordered, we will use Tubigrip for compression he will keep these on during all waking hours.  Patient was dispensed a surgical shoe right foot to remove pressure from his ulcerated areas, he is advised he can also use a soft slipper with a rubber sole as long as there is no pressure or rubbing.  I personally viewed patient's medical records, hospitalization records from recent [facility], lower extremity venous Dopplers, previous podiatry notes.  Today I reviewed frequent elevation, low-sodium diet, proper protein intake, proper glycemic control, strict pressure and friction reduction, patient and wife understand and  "agree with the plan and will follow-up in 2 weeks.    Debridement   Wound 07/13/24 Venous Ulcer Foot Anterior;Right    Universal Protocol:  Consent: Verbal consent obtained.  Risks and benefits: risks, benefits and alternatives were discussed  Consent given by: patient  Time out: Immediately prior to procedure a \"time out\" was called to verify the correct patient, procedure, equipment, support staff and site/side marked as required.  Patient understanding: patient states understanding of the procedure being performed  Patient identity confirmed: verbally with patient    Debridement Details  Performed by: physician  Debridement type: surgical  Level of debridement: subcutaneous tissue  Pain control: lidocaine 4%      Post-debridement measurements  Length (cm): 3.7  Width (cm): 3.7  Depth (cm): 0.2  Percent debrided: 100%  Surface Area (cm^2): 13.69  Area Debrided (cm^2): 13.69  Volume (cm^3): 2.74    Tissue and other material debrided: subcutaneous tissue  Devitalized tissue debrided: biofilm, fibrin, necrotic debris, slough and eschar  Instrument(s) utilized: blade and forceps  Bleeding: small  Hemostasis obtained with: pressure  Procedural pain (0-10): insensate  Post-procedural pain: insensate   Response to treatment: procedure was tolerated well    Debridement   Wound 07/13/24 Venous Ulcer Pretibial Distal;Right    Universal Protocol:  Consent: Verbal consent obtained.  Risks and benefits: risks, benefits and alternatives were discussed  Consent given by: patient  Time out: Immediately prior to procedure a \"time out\" was called to verify the correct patient, procedure, equipment, support staff and site/side marked as required.  Patient understanding: patient states understanding of the procedure being performed  Patient identity confirmed: verbally with patient    Debridement Details  Performed by: physician  Debridement type: surgical  Level of debridement: subcutaneous tissue  Pain control: lidocaine " 4%      Post-debridement measurements  Length (cm): 3.7  Width (cm): 3.2  Depth (cm): 0.3  Percent debrided: 100%  Surface Area (cm^2): 11.84  Area Debrided (cm^2): 11.84  Volume (cm^3): 3.55    Tissue and other material debrided: subcutaneous tissue  Devitalized tissue debrided: biofilm, fibrin, necrotic debris, slough and eschar  Instrument(s) utilized: blade and forceps  Bleeding: small  Hemostasis obtained with: pressure  Procedural pain (0-10): insensate  Post-procedural pain: insensate   Response to treatment: procedure was tolerated well                       Subjective:   Juan presents today with his wife for care of right foot and lower leg venous stasis ulcerations, I recently had the privilege of taking care of him when he was hospitalized at [facility].  Patient was sent home with visiting nurses and dressing instructions, patient's wife states they were discharged from visiting nurses after 2 visits, she did not have any supplies, wounds have been left open to air.  Patient has been wearing regular shoe gear.  He does not complain of any nausea, vomiting, fever, chills.          The following portions of the patient's history were reviewed and updated as appropriate:   Patient Active Problem List   Diagnosis    Alcoholism in remission (HCC)    Benzodiazepine dependence (HCC)    Bilateral adhesive capsulitis of shoulders    Bronchitis, mucopurulent recurrent (HCC)    Cirrhosis, alcoholic (HCC)    Diabetic peripheral neuropathy (HCC)    DM (diabetes mellitus) (HCC)    Herniated nucleus pulposus of lumbosacral region    Hypercholesterolemia    Lumbar spondylosis    Thyroid cancer (HCC)    Liver disease    Chronic pain syndrome    Lumbar radiculopathy    Chronic bilateral low back pain without sciatica    PONV (postoperative nausea and vomiting)    Medical marijuana use    Urinary retention    Anemia    Hyponatremia    Gallstones    Status post lumbar spinal fusion    Benign prostatic hyperplasia  with urinary retention    Scrotal pain    Lower urinary tract symptoms    Acute on chronic bilateral low back pain with sciatica    Anxiety and depression    Hypertension    Hypochromic microcytic anemia    Discitis of lumbosacral region    Foraminal stenosis of lumbar region    Sepsis without acute organ dysfunction (HCC)    Lactic acidosis    Type 2 diabetes mellitus with hyperglycemia, without long-term current use of insulin (HCC)    Failure of joint fusion (HCC)    Prostatitis    Lower extremity edema    Venous stasis ulcers (HCC)    LIGIA (acute kidney injury) (HCC)     Past Medical History:   Diagnosis Date    Anxiety     Arthritis     Cancer (HCC)     Chronic back pain     Depression     Diabetes mellitus (HCC)     Hypertension     Liver disease     Mitral valve prolapse     Peripheral neuropathy     Neuropathy    PONV (postoperative nausea and vomiting)     Thyroid disease      Past Surgical History:   Procedure Laterality Date    COLONOSCOPY      INCISION AND DRAINAGE OF WOUND Left 08/12/2022    Procedure: INCISION AND DRAINAGE (I&D) EXTREMITY;  Surgeon: Moustapha Cote DPM;  Location:  MAIN OR;  Service: Podiatry    IR BIOPSY SPINE  1/30/2024    IR BIOPSY SPINE  2/1/2024    IR PICC PLACEMENT SINGLE LUMEN  2/6/2024    JOINT REPLACEMENT Left 03/11/2022    Left TSA    FL ARTHRODESIS POSTERIOR/PSTLAT TQ 1NTRSPC LUMBAR Bilateral 04/11/2023    Procedure: L1-S1 navigated posterior decompression with instrumented fixation fusion;  Surgeon: James Moraes MD;  Location:  MAIN OR;  Service: Neurosurgery    THYROID SURGERY  2021    remove cancer     Social History     Socioeconomic History    Marital status: /Civil Union     Spouse name: None    Number of children: None    Years of education: None    Highest education level: None   Occupational History    None   Tobacco Use    Smoking status: Some Days     Current packs/day: 0.00     Average packs/day: 0.3 packs/day for 5.9 years (1.5 ttl pk-yrs)     Types:  Cigarettes     Start date: 1975     Last attempt to quit: 1981     Years since quittin.1    Smokeless tobacco: Never    Tobacco comments:     quit ; smokes when in pain as of 26   Vaping Use    Vaping status: Some Days    Substances: THC, CBD   Substance and Sexual Activity    Alcohol use: Yes    Drug use: Yes     Frequency: 7.0 times per week     Types: Marijuana     Comment: Medical Marijuana, takes for pain, daily, vape    Sexual activity: Yes     Partners: Female     Birth control/protection: None   Other Topics Concern    None   Social History Narrative    None     Social Determinants of Health     Financial Resource Strain: Not on file   Food Insecurity: Patient Declined (2024)    Received from [facility]    Hunger Vital Sign     Worried About Running Out of Food in the Last Year: Patient declined     Ran Out of Food in the Last Year: Patient declined   Transportation Needs: Patient Declined (2024)    Received from [facility]    PRAPARE - Transportation     Lack of Transportation (Medical): Patient declined     Lack of Transportation (Non-Medical): Patient declined   Physical Activity: Not on file   Stress: Not on file   Social Connections: Not on file   Intimate Partner Violence: Not on file   Housing Stability: Low Risk  (2024)    Housing Stability Vital Sign     Unable to Pay for Housing in the Last Year: No     Number of Times Moved in the Last Year: 1     Homeless in the Last Year: No        Current Outpatient Medications:     DAPTOMYCIN IV, Inject 480 mg into a catheter in a vein daily, Disp: , Rfl:     ACCU-CHEK FABIANO PLUS test strip, 4 (four) times a day, Disp: , Rfl: 1    ACCU-CHEK FASTCLIX LANCETS MISC, 4 (four) times a day, Disp: , Rfl: 1    acetaminophen (TYLENOL) 500 mg tablet, Take 2 tablets (1,000 mg total) by mouth every 8 (eight) hours 500 mg tablet, Disp: , Rfl:     Azelastine HCl 137 MCG/SPRAY  SOLN, 1 spray into each nostril Every 12 hours, Disp: , Rfl:     clonazePAM (KlonoPIN) 1 mg tablet, Take 1 mg by mouth 2 (two) times a day & 3rd pill PRN, Disp: , Rfl:     [START ON 8/10/2024] doxycycline hyclate (VIBRAMYCIN) 100 mg capsule, Take 100 mg by mouth 2 (two) times a day Do not start before August 10, 2024., Disp: , Rfl:     escitalopram (Lexapro) 20 mg tablet, Take 20 mg by mouth daily. Indications: ., Disp: , Rfl:     finasteride (PROSCAR) 5 mg tablet, Take 1 tablet (5 mg total) by mouth daily, Disp: 90 tablet, Rfl: 3    fluticasone (FLONASE) 50 mcg/act nasal spray, 1 spray into each nostril daily as needed, Disp: , Rfl:     folic acid (FOLVITE) 1 mg tablet, Take 2,000 mcg by mouth daily, Disp: , Rfl: 6    furosemide (LASIX) 20 mg tablet, Take 1 tablet (20 mg total) by mouth daily as needed (for weight gain more than 3 lbs overnight or more than 5 lbs in a week, or lower extremity swelling), Disp: 30 tablet, Rfl: 0    gabapentin (NEURONTIN) 100 mg capsule, Take 1 capsule (100 mg total) by mouth daily, Disp: 30 capsule, Rfl: 0    GNP Aspirin Low Dose 81 MG EC tablet, Take 1 tablet (81 mg total) by mouth daily Do not start before April 25, 2023., Disp: , Rfl: 0    hydrOXYzine pamoate (VISTARIL) 50 mg capsule, Take 50 mg by mouth 2 (two) times a day. Indications: Feeling Anxious, Disp: , Rfl:     Jardiance 10 MG TABS, Take 10 mg by mouth every morning, Disp: , Rfl:     ketoconazole (NIZORAL) 2 % shampoo, Apply 1 application. topically daily Apply to scalp, Disp: , Rfl: 6    Lactobacillus Acid-Pectin (Acidophilus/Pectin) CAPS, Take 1 capsule by mouth daily. Dr Carmenza Szymanski  Indications: 52 days while on abx, Disp: , Rfl:     levofloxacin (LEVAQUIN) 500 mg tablet, Take 500 mg by mouth daily, Disp: , Rfl:     lidocaine (LIDODERM) 5 %, Apply 1 patch topically over 12 hours daily Remove & Discard patch within 12 hours or as directed by MD, Disp: 15 patch, Rfl: 0    loratadine (CLARITIN) 10 mg tablet, Take 10  mg by mouth daily. per med [facility]  Indications: Hayfever, Disp: , Rfl:     lovastatin (MEVACOR) 20 mg tablet, Take 20 mg by mouth every morning, Disp: , Rfl: 3    metFORMIN (GLUCOPHAGE) 1000 MG tablet, Take 1,000 mg by mouth 2 (two) times a day with meals , Disp: , Rfl:     methocarbamol (ROBAXIN) 750 mg tablet, Take 1 tablet (750 mg total) by mouth every 6 (six) hours as needed for muscle spasms, Disp: , Rfl:     metoprolol succinate (TOPROL-XL) 50 mg 24 hr tablet, Take 50 mg by mouth 2 (two) times a day, Disp: , Rfl:     mirtazapine (REMERON) 45 MG tablet, Take 45 mg by mouth daily at bedtime, Disp: , Rfl: 1    NIFEdipine (PROCARDIA XL) 30 mg 24 hr tablet, TAKE 1 TABLET BY MOUTH EVERY DAY IN ADDITION TO 90MG FOR A TOTAL OF 120MG EVERY DAY, Disp: , Rfl:     NIFEdipine (PROCARDIA XL) 90 mg 24 hr tablet, Take 120 mg by mouth every morning Takes 90 mg and 30 mg =120 mg every AM, Disp: , Rfl: 3    ondansetron (Zofran ODT) 4 mg disintegrating tablet, Take 1 tablet (4 mg total) by mouth every 6 (six) hours as needed for nausea or vomiting, Disp: 30 tablet, Rfl: 0    oxyCODONE (ROXICODONE) 5 immediate release tablet, Take 5 mg by mouth every 6 (six) hours as needed for severe pain. Dr. Katie MELCHOR  Indications: ., Disp: , Rfl:     patient supplied medication, medical marijuana vape as needed per latest [facility]  Indications: ., Disp: , Rfl:     senna (SENOKOT) 8.6 mg, Take 1 tablet (8.6 mg total) by mouth 2 (two) times a day 2 tabs at bedtime as needed for constipation per latest [facility], Disp: , Rfl:     sertraline (ZOLOFT) 100 mg tablet, Take 100 mg by mouth every morning, Disp: , Rfl:     Sodium Chloride Flush (Normal Saline Flush) 0.9 % SOLN, Inject 10-20 mL into a catheter in a vein every 8 (eight) hours as needed (Before after antibiotic and as needed for patency). Indications: ., Disp: , Rfl:     tamsulosin (FLOMAX) 0.4 mg, Take 1 capsule (0.4 mg total) by mouth daily with dinner, Disp: 90 capsule,  "Rfl: 3    Vancomycin HCl 1000 MG/10ML SOLN, Inject 1,000 mg into a catheter in a vein 2 (two) times a day for 23 days (Patient not taking: Reported on 7/24/2024), Disp: 10 mL, Rfl: 0    VANCOMYCIN HCL IV, Inject 1,000 mg into a catheter in a vein every 12 (twelve) hours. Dr. BETTY Sales [facility] [employer] 100 ml NSS over 1 hour done by [facility] [phone]  Indications: . (Patient not taking: Reported on 7/24/2024), Disp: , Rfl:   No current facility-administered medications for this visit.  Family History   Problem Relation Age of Onset    No Known Problems Father     No Known Problems Mother     Colon cancer Neg Hx        Review of Systems   Constitutional:  Negative for chills and fever.   HENT:  Negative for ear pain and sore throat.    Eyes:  Negative for pain and visual disturbance.   Respiratory:  Negative for cough and shortness of breath.    Cardiovascular:  Negative for chest pain and palpitations.   Gastrointestinal:  Negative for abdominal pain and vomiting.   Genitourinary:  Negative for dysuria and hematuria.   Musculoskeletal:  Positive for gait problem. Negative for arthralgias and back pain.   Skin:  Positive for color change and wound. Negative for rash.   Neurological:  Positive for numbness. Negative for seizures and syncope.   Psychiatric/Behavioral: Negative.     All other systems reviewed and are negative.        Objective:  /70   Pulse 76   Temp 98.9 °F (37.2 °C)   Resp 16   Ht 5' 8\" (1.727 m)   Wt 81.6 kg (180 lb)   BMI 27.37 kg/m²     Physical Exam  Constitutional:       Appearance: Normal appearance. He is normal weight.   HENT:      Head: Normocephalic and atraumatic.      Right Ear: External ear normal.      Left Ear: External ear normal.      Nose: Nose normal.      Mouth/Throat:      Mouth: Mucous membranes are moist.      Pharynx: Oropharynx is clear.   Eyes:      Conjunctiva/sclera: Conjunctivae normal.      Pupils: Pupils are equal, round, and reactive to light. "   Cardiovascular:      Pulses:           Dorsalis pedis pulses are detected w/ Doppler on the right side and detected w/ Doppler on the left side.        Posterior tibial pulses are detected w/ Doppler on the right side and detected w/ Doppler on the left side.   Pulmonary:      Effort: Pulmonary effort is normal.   Musculoskeletal:      Cervical back: Normal range of motion.      Right lower le+ Pitting Edema present.      Left lower le+ Pitting Edema present.   Skin:     General: Skin is warm and dry.      Capillary Refill: Capillary refill takes less than 2 seconds.   Neurological:      General: No focal deficit present.      Mental Status: He is alert and oriented to person, place, and time. Mental status is at baseline.      Sensory: Sensory deficit present.      Coordination: Coordination abnormal.      Gait: Gait abnormal.      Deep Tendon Reflexes: Reflexes abnormal.   Psychiatric:         Mood and Affect: Mood normal.         Behavior: Behavior normal.         Thought Content: Thought content normal.         Judgment: Judgment normal.           Wound 24 Venous Ulcer Foot Anterior;Right (Active)   Wound Image Images linked 24 1557   Wound Description Brown;Yellow;Dry;Eschar 24 1523   Emely-wound Assessment Dry;Edema;Pink 24 1523   Wound Length (cm) 3.5 cm 24 1523   Wound Width (cm) 3.7 cm 24 1523   Wound Depth (cm) 0 cm 24 1523   Wound Surface Area (cm^2) 12.95 cm^2 24 1523   Wound Volume (cm^3) 0 cm^3 24 1523   Calculated Wound Volume (cm^3) 0 cm^3 24 1523   Change in Wound Size % 100 24 1523   Drainage Amount Small 24 1523   Drainage Description Serosanguineous 24 1523   Dressing Status Intact 24 1523       Wound 24 Venous Ulcer Pretibial Distal;Right (Active)   Wound Image Images linked 24 1546   Wound Description Slough;Yellow;Pink;Brown;Eschar 24 1522   Emely-wound Assessment Dry;Pink;Purple  07/24/24 1522   Wound Length (cm) 3.7 cm 07/24/24 1522   Wound Width (cm) 3 cm 07/24/24 1522   Wound Depth (cm) 0.1 cm 07/24/24 1522   Wound Surface Area (cm^2) 11.1 cm^2 07/24/24 1522   Wound Volume (cm^3) 1.11 cm^3 07/24/24 1522   Calculated Wound Volume (cm^3) 1.11 cm^3 07/24/24 1522   Change in Wound Size % 62.5 07/24/24 1522   Drainage Amount Small 07/24/24 1522   Drainage Description Serosanguineous 07/24/24 1522   Dressing Status Intact 07/24/24 1522       [REMOVED] Wound 07/13/24 Venous Ulcer Foot Anterior;Left;Medial (Removed)   Wound Image Images linked 07/24/24 1513   Wound Description Epithelialization 07/24/24 1513   Wound Length (cm) 0 cm 07/24/24 1513   Wound Width (cm) 0 cm 07/24/24 1513   Wound Depth (cm) 0 cm 07/24/24 1513   Wound Surface Area (cm^2) 0 cm^2 07/24/24 1513   Wound Volume (cm^3) 0 cm^3 07/24/24 1513   Calculated Wound Volume (cm^3) 0 cm^3 07/24/24 1513   Change in Wound Size % 100 07/24/24 1513   Drainage Amount None 07/24/24 1513   Dressing Status Intact 07/24/24 1513          Results from last 6 Months   Lab Units 02/01/24  1455   WOUND CULTURE  No growth        VAS VENOUS DUPLEX - LOWER LIMB BILATERAL    Result Date: 7/15/2024  Narrative:  [facility]PORT [facility]IN[facility]: Indications: Localized edema [R60.0].  Patient presents with bilateral lower extremity edema since last week. Operative History: No known cardiovascular surgeries   CONCLUSION:  Impression: RIGHT LOWER LIMB: No evidence of acute or chronic deep vein thrombosis. No evidence of superficial thrombophlebitis noted. Doppler evaluation shows a normal response to augmentation maneuvers. Popliteal, posterior tibial and anterior tibial arterial Doppler waveform's are triphasic. Tech Note: There is an echogenic structure located in the inguinal region measuring approximately 1.88cm suggestive of enlarged lymphatic channels.  LEFT LOWER LIMB: No evidence of acute or chronic deep vein thrombosis. No evidence of  "superficial thrombophlebitis noted. Doppler evaluation shows a normal response to augmentation maneuvers. Popliteal, posterior tibial and anterior tibial arterial Doppler waveform's are triphasic.  Technical findings were faxed to the patient's chart.  SIGNATURE: Electronically Signed by: SADAF MCCOY MD on 2024-07-15 03:28:45 PM      Wound Instructions:  Orders Placed This Encounter   Procedures    Wound cleansing and dressings     Right foot and leg wounds:    Cleanse wounds with prophase. Pat dry  Apply dermagran gauze to wounds   Cover with gauze   Secure with rolled gauze and tape   Change dressing 3 times a week and as needed for excessive drainage     Wear only surgical shoe at all times--do not wear regular shoe at this time       Elastic Tubular Stocking--size F to bilateral legs     Tubular elastic bandage: Apply from base of toes to behind the knee. Apply in AM, may remove for sleep.    Avoid prolonged standing in one place.    Elevate leg(s) above the level of the heart when sitting or as much as possible.     Standing Status:   Future     Standing Expiration Date:   8/7/2024    Wound Procedure Treatment     This order was created via procedure documentation    Debridement     This order was created via procedure documentation    Debridement     This order was created via procedure documentation         Yoly Ward, GARRICK, DPM, FACFAS    Portions of the record may have been created with voice recognition software. Occasional wrong word or \"sound a like\" substitutions may have occurred due to the inherent limitations of voice recognition software. Read the chart carefully and recognize, using context, where substitutions have occurred.    "

## 2024-07-24 NOTE — PROGRESS NOTES
Wound Procedure Treatment    Performed by: Zaida Mtz RN  Authorized by: Yoly Ward DPM    Associated wounds:   Wound 07/13/24 Venous Ulcer Foot Anterior;Right  Wound 07/13/24 Venous Ulcer Pretibial Distal;Right  Wound cleansed with:  Wound cleanser  Applied primary dressing:  Dermagran  Applied secondary dressing:  Gauze  Dressing secured with:  Elastic tubular stocking, Size F, Luiza and Tape  Offloading device appllied:  Surgical shoe

## 2024-07-24 NOTE — PATIENT INSTRUCTIONS
Orders Placed This Encounter   Procedures    Wound cleansing and dressings     Right foot and leg wounds:    Cleanse wounds with prophase. Pat dry  Apply dermagran gauze to wounds   Cover with gauze   Secure with rolled gauze and tape   Change dressing 3 times a week and as needed for excessive drainage     Wear only surgical shoe at all times--do not wear regular shoe at this time       Elastic Tubular Stocking--size F to bilateral legs     Tubular elastic bandage: Apply from base of toes to behind the knee. Apply in AM, may remove for sleep.    Avoid prolonged standing in one place.    Elevate leg(s) above the level of the heart when sitting or as much as possible.     Standing Status:   Future     Standing Expiration Date:   8/7/2024

## 2024-07-26 ENCOUNTER — HOME CARE VISIT (OUTPATIENT)
Dept: HOME HEALTH SERVICES | Facility: HOME HEALTHCARE | Age: 69
End: 2024-07-26
Payer: COMMERCIAL

## 2024-07-26 DIAGNOSIS — R33.9 URINARY RETENTION: ICD-10-CM

## 2024-07-26 PROCEDURE — G0157 HHC PT ASSISTANT EA 15: HCPCS

## 2024-07-26 RX ORDER — FINASTERIDE 5 MG/1
5 TABLET, FILM COATED ORAL DAILY
Qty: 100 TABLET | Refills: 1 | Status: SHIPPED | OUTPATIENT
Start: 2024-07-26

## 2024-07-28 VITALS
DIASTOLIC BLOOD PRESSURE: 81 MMHG | HEART RATE: 74 BPM | TEMPERATURE: 95.4 F | SYSTOLIC BLOOD PRESSURE: 121 MMHG | OXYGEN SATURATION: 97 % | RESPIRATION RATE: 17 BRPM

## 2024-07-29 ENCOUNTER — HOME CARE VISIT (OUTPATIENT)
Dept: HOME HEALTH SERVICES | Facility: HOME HEALTHCARE | Age: 69
End: 2024-07-29
Payer: COMMERCIAL

## 2024-07-29 PROCEDURE — G0157 HHC PT ASSISTANT EA 15: HCPCS

## 2024-07-31 ENCOUNTER — HOME CARE VISIT (OUTPATIENT)
Dept: HOME HEALTH SERVICES | Facility: HOME HEALTHCARE | Age: 69
End: 2024-07-31
Payer: COMMERCIAL

## 2024-08-07 ENCOUNTER — OFFICE VISIT (OUTPATIENT)
Dept: WOUND CARE | Facility: HOSPITAL | Age: 69
End: 2024-08-07
Payer: COMMERCIAL

## 2024-08-07 VITALS
DIASTOLIC BLOOD PRESSURE: 72 MMHG | TEMPERATURE: 96.1 F | RESPIRATION RATE: 16 BRPM | SYSTOLIC BLOOD PRESSURE: 144 MMHG | HEART RATE: 76 BPM

## 2024-08-07 DIAGNOSIS — L97.919 IDIOPATHIC CHRONIC VENOUS HYPERTENSION OF RIGHT LOWER EXTREMITY WITH ULCER (HCC): Primary | ICD-10-CM

## 2024-08-07 DIAGNOSIS — E11.42 DIABETIC PERIPHERAL NEUROPATHY (HCC): ICD-10-CM

## 2024-08-07 DIAGNOSIS — I87.311 IDIOPATHIC CHRONIC VENOUS HYPERTENSION OF RIGHT LOWER EXTREMITY WITH ULCER (HCC): Primary | ICD-10-CM

## 2024-08-07 PROCEDURE — 11042 DBRDMT SUBQ TIS 1ST 20SQCM/<: CPT | Performed by: PODIATRIST

## 2024-08-07 RX ORDER — LIDOCAINE 40 MG/G
CREAM TOPICAL ONCE
Status: COMPLETED | OUTPATIENT
Start: 2024-08-07 | End: 2024-08-07

## 2024-08-07 RX ADMIN — LIDOCAINE: 40 CREAM TOPICAL at 15:36

## 2024-08-07 NOTE — PATIENT INSTRUCTIONS
Orders Placed This Encounter   Procedures    Debridement Venous Ulcer Anterior;Right Foot     This order was created via procedure documentation    Debridement Venous Ulcer Distal;Right Pretibial     This order was created via procedure documentation    Wound cleansing and dressings     Right foot and leg wounds:     Cleanse wounds with prophase. Pat dry  Apply dermagran gauze to wounds   Cover with gauze   Secure with rolled gauze and tape   Change dressing 3 times a week and as needed for excessive drainage      Wear only surgical shoe at all times--do not wear regular shoe at this time         Elastic Tubular Stocking--size F to bilateral legs      Tubular elastic bandage: Apply from base of toes to behind the knee. Apply in AM, may remove for sleep.     Avoid prolonged standing in one place.     Elevate leg(s) above the level of the heart when sitting or as much as possible.     Standing Status:   Future     Standing Expiration Date:   8/21/2024       Orders Placed This Encounter   Procedures    Debridement Venous Ulcer Anterior;Right Foot     This order was created via procedure documentation    Debridement Venous Ulcer Distal;Right Pretibial     This order was created via procedure documentation    Wound cleansing and dressings     Right foot and leg wounds:     Cleanse wounds with prophase. Pat dry  Apply dermagran gauze to wounds   Cover with gauze   Secure with rolled gauze and tape   Change dressing 3 times a week and as needed for excessive drainage      Wear only surgical shoe at all times--do not wear regular shoe at this time         Elastic Tubular Stocking--size F to bilateral legs      Tubular elastic bandage: Apply from base of toes to behind the knee. Apply in AM, may remove for sleep.     Avoid prolonged standing in one place.     Elevate leg(s) above the level of the heart when sitting or as much as possible.     Standing Status:   Future     Standing Expiration Date:   8/21/2024       Orders  Placed This Encounter   Procedures    Debridement Venous Ulcer Anterior;Right Foot     This order was created via procedure documentation    Debridement Venous Ulcer Distal;Right Pretibial     This order was created via procedure documentation    Wound cleansing and dressings     Right foot and leg wounds:     Cleanse wounds with prophase. Pat dry  Apply dermagran gauze to wounds   Cover with gauze   Secure with rolled gauze and tape   Change dressing 3 times a week and as needed for excessive drainage      Wear only surgical shoe at all times--do not wear regular shoe at this time         Elastic Tubular Stocking--size F to bilateral legs      Tubular elastic bandage: Apply from base of toes to behind the knee. Apply in AM, may remove for sleep.     Avoid prolonged standing in one place.     Elevate leg(s) above the level of the heart when sitting or as much as possible.     Standing Status:   Future     Standing Expiration Date:   8/21/2024

## 2024-08-07 NOTE — PROGRESS NOTES
"Patient ID: Juan San is a 69 y.o. male Date of Birth 1955       Chief Complaint   Patient presents with    Follow Up Wound Care Visit     Right foot and leg wounds        Allergies:  Abilify [aripiprazole], Cephalexin, Molds & smuts, and Pregabalin    Diagnosis:  1. Idiopathic chronic venous hypertension of right lower extremity with ulcer (HCC)  -     lidocaine (LMX) 4 % cream  2. Diabetic peripheral neuropathy (HCC)  -     lidocaine (LMX) 4 % cream     Diagnosis ICD-10-CM Associated Orders   1. Idiopathic chronic venous hypertension of right lower extremity with ulcer (HCC)  I87.311 lidocaine (LMX) 4 % cream    L97.919       2. Diabetic peripheral neuropathy (HCC)  E11.42 lidocaine (LMX) 4 % cream           Assessment & Plan:  Venous stasis ulceration right lower leg and dorsal right foot, these are complicated by type 2 diabetes with peripheral neuropathy.  Both wounds were debrided through subcuticular tissues and dressed with Dermagran gauze dry dressing, patient's wife will continue to do the same 3 times a week, she will use prophase to cleanse all wounds.  Patient may use cast cover to shower.  Do not get foot and leg wet.  Patient to continue with use of Tubigrip for compression during all waking hours.  Continue surgical shoe right foot to remove pressure from the ulcerated areas.  Today I reviewed frequent elevation, low-sodium diet, proper protein intake, proper glycemic control, strict pressure and friction reduction with patient and wife, they understand and agree with the plan and will follow-up in 2 weeks.    Debridement   Wound 07/13/24 Venous Ulcer Foot Anterior;Right    Universal Protocol:  Consent: Verbal consent obtained.  Risks and benefits: risks, benefits and alternatives were discussed  Consent given by: patient  Time out: Immediately prior to procedure a \"time out\" was called to verify the correct patient, procedure, equipment, support staff and site/side marked as " "required.  Patient understanding: patient states understanding of the procedure being performed  Patient identity confirmed: verbally with patient    Debridement Details  Performed by: physician  Debridement type: surgical  Level of debridement: subcutaneous tissue  Pain control: lidocaine 4%      Post-debridement measurements  Length (cm): 2.7  Width (cm): 3.7  Depth (cm): 0.3  Percent debrided: 100%  Surface Area (cm^2): 9.99  Area Debrided (cm^2): 9.99  Volume (cm^3): 3    Tissue and other material debrided: subcutaneous tissue  Devitalized tissue debrided: biofilm, fibrin, necrotic debris and slough  Instrument(s) utilized: blade  Bleeding: small  Hemostasis obtained with: pressure  Procedural pain (0-10): insensate  Post-procedural pain: insensate   Response to treatment: procedure was tolerated well    Debridement   Wound 07/13/24 Venous Ulcer Pretibial Distal;Right    Universal Protocol:  Consent: Verbal consent obtained.  Risks and benefits: risks, benefits and alternatives were discussed  Consent given by: patient  Time out: Immediately prior to procedure a \"time out\" was called to verify the correct patient, procedure, equipment, support staff and site/side marked as required.  Patient understanding: patient states understanding of the procedure being performed  Patient identity confirmed: verbally with patient    Debridement Details  Performed by: physician  Debridement type: surgical  Level of debridement: subcutaneous tissue  Pain control: lidocaine 4%      Post-debridement measurements  Length (cm): 3  Width (cm): 2.6  Depth (cm): 0.3  Percent debrided: 100%  Surface Area (cm^2): 7.8  Area Debrided (cm^2): 7.8  Volume (cm^3): 2.34    Tissue and other material debrided: subcutaneous tissue  Devitalized tissue debrided: biofilm, fibrin, necrotic debris and slough  Instrument(s) utilized: blade  Bleeding: small  Hemostasis obtained with: pressure  Procedural pain (0-10): insensate  Post-procedural pain: " insensate   Response to treatment: procedure was tolerated well                       Subjective:   Juan presents today for care of right foot and lower leg venous stasis ulceration complicated by diabetes.  His wife has been changing dressings as directed.  He states he is trying to eat more and move more.  No new complaints.          The following portions of the patient's history were reviewed and updated as appropriate:   Patient Active Problem List   Diagnosis    Alcoholism in remission (HCC)    Benzodiazepine dependence (HCC)    Bilateral adhesive capsulitis of shoulders    Bronchitis, mucopurulent recurrent (HCC)    Cirrhosis, alcoholic (HCC)    Diabetic peripheral neuropathy (HCC)    DM (diabetes mellitus) (HCC)    Herniated nucleus pulposus of lumbosacral region    Hypercholesterolemia    Lumbar spondylosis    Thyroid cancer (HCC)    Liver disease    Chronic pain syndrome    Lumbar radiculopathy    Chronic bilateral low back pain without sciatica    PONV (postoperative nausea and vomiting)    Medical marijuana use    Urinary retention    Anemia    Hyponatremia    Gallstones    Status post lumbar spinal fusion    Benign prostatic hyperplasia with urinary retention    Scrotal pain    Lower urinary tract symptoms    Acute on chronic bilateral low back pain with sciatica    Anxiety and depression    Hypertension    Hypochromic microcytic anemia    Discitis of lumbosacral region    Foraminal stenosis of lumbar region    Sepsis without acute organ dysfunction (HCC)    Lactic acidosis    Type 2 diabetes mellitus with hyperglycemia, without long-term current use of insulin (HCC)    Failure of joint fusion (HCC)    Prostatitis    Lower extremity edema    Venous stasis ulcers (HCC)    LIGIA (acute kidney injury) (HCC)     Past Medical History:   Diagnosis Date    Anxiety     Arthritis     Cancer (HCC)     Chronic back pain     Depression     Diabetes mellitus (HCC)     Hypertension     Liver disease     Mitral valve  prolapse     Peripheral neuropathy     Neuropathy    PONV (postoperative nausea and vomiting)     Thyroid disease      Past Surgical History:   Procedure Laterality Date    COLONOSCOPY      INCISION AND DRAINAGE OF WOUND Left 2022    Procedure: INCISION AND DRAINAGE (I&D) EXTREMITY;  Surgeon: Moustapha Cote DPM;  Location:  MAIN OR;  Service: Podiatry    IR BIOPSY SPINE  2024    IR BIOPSY SPINE  2024    IR PICC PLACEMENT SINGLE LUMEN  2024    JOINT REPLACEMENT Left 2022    Left TSA    CO ARTHRODESIS POSTERIOR/PSTLAT TQ 1NTRSPC LUMBAR Bilateral 2023    Procedure: L1-S1 navigated posterior decompression with instrumented fixation fusion;  Surgeon: James Moraes MD;  Location:  MAIN OR;  Service: Neurosurgery    THYROID SURGERY      remove cancer     Social History     Socioeconomic History    Marital status: /Civil Union     Spouse name: Not on file    Number of children: Not on file    Years of education: Not on file    Highest education level: Not on file   Occupational History    Not on file   Tobacco Use    Smoking status: Some Days     Current packs/day: 0.00     Average packs/day: 0.3 packs/day for 5.9 years (1.5 ttl pk-yrs)     Types: Cigarettes     Start date: 1975     Last attempt to quit: 1981     Years since quittin.2    Smokeless tobacco: Never    Tobacco comments:     quit ; smokes when in pain as of 26   Vaping Use    Vaping status: Some Days    Substances: THC, CBD   Substance and Sexual Activity    Alcohol use: Yes    Drug use: Yes     Frequency: 7.0 times per week     Types: Marijuana     Comment: Medical Marijuana, takes for pain, daily, vape    Sexual activity: Yes     Partners: Female     Birth control/protection: None   Other Topics Concern    Not on file   Social History Narrative    Not on file     Social Determinants of Health     Financial Resource Strain: Not on file   Food Insecurity: Patient Declined (2024)     Received from [facility]    Hunger Vital Sign     Worried About Running Out of Food in the Last Year: Patient declined     Ran Out of Food in the Last Year: Patient declined   Transportation Needs: Patient Declined (6/28/2024)    Received from [facility]    PRAPARE - Transportation     Lack of Transportation (Medical): Patient declined     Lack of Transportation (Non-Medical): Patient declined   Physical Activity: Not on file   Stress: Not on file   Social Connections: Not on file   Intimate Partner Violence: Not on file   Housing Stability: Low Risk  (5/20/2024)    Housing Stability Vital Sign     Unable to Pay for Housing in the Last Year: No     Number of Times Moved in the Last Year: 1     Homeless in the Last Year: No        Current Outpatient Medications:     ACCU-CHEK FABIANO PLUS test strip, 4 (four) times a day, Disp: , Rfl: 1    ACCU-CHEK FASTCLIX LANCETS MISC, 4 (four) times a day, Disp: , Rfl: 1    acetaminophen (TYLENOL) 500 mg tablet, Take 2 tablets (1,000 mg total) by mouth every 8 (eight) hours 500 mg tablet, Disp: , Rfl:     Azelastine HCl 137 MCG/SPRAY SOLN, 1 spray into each nostril Every 12 hours, Disp: , Rfl:     clonazePAM (KlonoPIN) 1 mg tablet, Take 1 mg by mouth 2 (two) times a day & 3rd pill PRN, Disp: , Rfl:     DAPTOMYCIN IV, Inject 480 mg into a catheter in a vein daily, Disp: , Rfl:     [START ON 8/10/2024] doxycycline hyclate (VIBRAMYCIN) 100 mg capsule, Take 100 mg by mouth 2 (two) times a day Do not start before August 10, 2024., Disp: , Rfl:     escitalopram (Lexapro) 20 mg tablet, Take 20 mg by mouth daily. Indications: ., Disp: , Rfl:     finasteride (PROSCAR) 5 mg tablet, TAKE 1 TABLET BY MOUTH DAILY, Disp: 100 tablet, Rfl: 1    fluticasone (FLONASE) 50 mcg/act nasal spray, 1 spray into each nostril daily as needed, Disp: , Rfl:     folic acid (FOLVITE) 1 mg tablet, Take 2,000 mcg by mouth daily, Disp: , Rfl: 6     furosemide (LASIX) 20 mg tablet, Take 1 tablet (20 mg total) by mouth daily as needed (for weight gain more than 3 lbs overnight or more than 5 lbs in a week, or lower extremity swelling), Disp: 30 tablet, Rfl: 0    gabapentin (NEURONTIN) 100 mg capsule, Take 1 capsule (100 mg total) by mouth daily, Disp: 30 capsule, Rfl: 0    GNP Aspirin Low Dose 81 MG EC tablet, Take 1 tablet (81 mg total) by mouth daily Do not start before April 25, 2023., Disp: , Rfl: 0    hydrOXYzine pamoate (VISTARIL) 50 mg capsule, Take 50 mg by mouth 2 (two) times a day. Indications: Feeling Anxious, Disp: , Rfl:     Jardiance 10 MG TABS, Take 10 mg by mouth every morning, Disp: , Rfl:     ketoconazole (NIZORAL) 2 % shampoo, Apply 1 application. topically daily Apply to scalp, Disp: , Rfl: 6    Lactobacillus Acid-Pectin (Acidophilus/Pectin) CAPS, Take 1 capsule by mouth daily. Dr Carmenza Szymanski  Indications: 52 days while on abx, Disp: , Rfl:     levofloxacin (LEVAQUIN) 500 mg tablet, Take 500 mg by mouth daily, Disp: , Rfl:     lidocaine (LIDODERM) 5 %, Apply 1 patch topically over 12 hours daily Remove & Discard patch within 12 hours or as directed by MD, Disp: 15 patch, Rfl: 0    loratadine (CLARITIN) 10 mg tablet, Take 10 mg by mouth daily. per med list humberto SALAZAR  Indications: Hayfever, Disp: , Rfl:     lovastatin (MEVACOR) 20 mg tablet, Take 20 mg by mouth every morning, Disp: , Rfl: 3    metFORMIN (GLUCOPHAGE) 1000 MG tablet, Take 1,000 mg by mouth 2 (two) times a day with meals , Disp: , Rfl:     methocarbamol (ROBAXIN) 750 mg tablet, Take 1 tablet (750 mg total) by mouth every 6 (six) hours as needed for muscle spasms, Disp: , Rfl:     metoprolol succinate (TOPROL-XL) 50 mg 24 hr tablet, Take 50 mg by mouth 2 (two) times a day, Disp: , Rfl:     mirtazapine (REMERON) 45 MG tablet, Take 45 mg by mouth daily at bedtime, Disp: , Rfl: 1    NIFEdipine (PROCARDIA XL) 30 mg 24 hr tablet, TAKE 1 TABLET BY MOUTH EVERY DAY IN ADDITION TO  90MG FOR A TOTAL OF 120MG EVERY DAY, Disp: , Rfl:     NIFEdipine (PROCARDIA XL) 90 mg 24 hr tablet, Take 120 mg by mouth every morning Takes 90 mg and 30 mg =120 mg every AM, Disp: , Rfl: 3    ondansetron (Zofran ODT) 4 mg disintegrating tablet, Take 1 tablet (4 mg total) by mouth every 6 (six) hours as needed for nausea or vomiting, Disp: 30 tablet, Rfl: 0    oxyCODONE (ROXICODONE) 5 immediate release tablet, Take 5 mg by mouth every 6 (six) hours as needed for severe pain. Dr. Katie MELCHOR  Indications: ., Disp: , Rfl:     patient supplied medication, medical marijuana vape as needed per latest dci  Indications: ., Disp: , Rfl:     senna (SENOKOT) 8.6 mg, Take 1 tablet (8.6 mg total) by mouth 2 (two) times a day 2 tabs at bedtime as needed for constipation per latest dci, Disp: , Rfl:     sertraline (ZOLOFT) 100 mg tablet, Take 100 mg by mouth every morning, Disp: , Rfl:     Sodium Chloride Flush (Normal Saline Flush) 0.9 % SOLN, Inject 10-20 mL into a catheter in a vein every 8 (eight) hours as needed (Before after antibiotic and as needed for patency). Indications: ., Disp: , Rfl:     tamsulosin (FLOMAX) 0.4 mg, Take 1 capsule (0.4 mg total) by mouth daily with dinner, Disp: 90 capsule, Rfl: 3    Vancomycin HCl 1000 MG/10ML SOLN, Inject 1,000 mg into a catheter in a vein 2 (two) times a day for 23 days (Patient not taking: Reported on 7/24/2024), Disp: 10 mL, Rfl: 0    VANCOMYCIN HCL IV, Inject 1,000 mg into a catheter in a vein every 12 (twelve) hours. Dr. BETTY Sales [facility] [employer] 100 ml NSS over 1 hour done by [facility] [phone]  Indications: . (Patient not taking: Reported on 7/24/2024), Disp: , Rfl:   No current facility-administered medications for this visit.  Family History   Problem Relation Age of Onset    No Known Problems Father     No Known Problems Mother     Colon cancer Neg Hx        Review of Systems   Constitutional:  Negative for chills and fever.   HENT:  Negative for ear pain and  sore throat.    Eyes:  Negative for pain and visual disturbance.   Respiratory:  Negative for cough and shortness of breath.    Cardiovascular:  Negative for chest pain and palpitations.   Gastrointestinal:  Negative for abdominal pain and vomiting.   Genitourinary:  Negative for dysuria and hematuria.   Musculoskeletal:  Positive for gait problem. Negative for arthralgias and back pain.   Skin:  Positive for color change and wound. Negative for rash.   Neurological:  Positive for numbness. Negative for seizures and syncope.   Psychiatric/Behavioral: Negative.     All other systems reviewed and are negative.        Objective:  /72   Pulse 76   Temp (!) 96.1 °F (35.6 °C)   Resp 16     Physical Exam  Constitutional:       Appearance: Normal appearance. He is normal weight.   HENT:      Head: Normocephalic and atraumatic.      Right Ear: External ear normal.      Left Ear: External ear normal.      Nose: Nose normal.      Mouth/Throat:      Mouth: Mucous membranes are moist.      Pharynx: Oropharynx is clear.   Eyes:      Conjunctiva/sclera: Conjunctivae normal.      Pupils: Pupils are equal, round, and reactive to light.   Cardiovascular:      Pulses: Normal pulses.           Dorsalis pedis pulses are 2+ on the right side and 2+ on the left side.        Posterior tibial pulses are 2+ on the right side and 2+ on the left side.   Pulmonary:      Effort: Pulmonary effort is normal.   Musculoskeletal:      Cervical back: Normal range of motion.      Right lower le+ Pitting Edema present.      Left lower le+ Pitting Edema present.   Skin:     General: Skin is warm and dry.      Capillary Refill: Capillary refill takes less than 2 seconds.   Neurological:      General: No focal deficit present.      Mental Status: He is alert and oriented to person, place, and time. Mental status is at baseline.      Sensory: Sensory deficit present.      Coordination: Coordination abnormal.      Gait: Gait abnormal.       Deep Tendon Reflexes: Reflexes abnormal.   Psychiatric:         Mood and Affect: Mood normal.         Behavior: Behavior normal.         Thought Content: Thought content normal.         Judgment: Judgment normal.           Wound 07/13/24 Venous Ulcer Foot Anterior;Right (Active)   Wound Image Images linked 08/07/24 1545   Wound Description Pink;Yellow;Slough 08/07/24 1534   Emely-wound Assessment Pink;Maceration 08/07/24 1534   Wound Length (cm) 2.7 cm 08/07/24 1534   Wound Width (cm) 3.7 cm 08/07/24 1534   Wound Depth (cm) 0.1 cm 08/07/24 1534   Wound Surface Area (cm^2) 9.99 cm^2 08/07/24 1534   Wound Volume (cm^3) 0.999 cm^3 08/07/24 1534   Calculated Wound Volume (cm^3) 1 cm^3 08/07/24 1534   Change in Wound Size % 63.37 08/07/24 1534   Drainage Amount Moderate 08/07/24 1534   Drainage Description Serosanguineous;Yellow 08/07/24 1534   Non-staged Wound Description Full thickness 08/07/24 1534   Dressing Status Intact 08/07/24 1534       Wound 07/13/24 Venous Ulcer Pretibial Distal;Right (Active)   Wound Image Images linked 08/07/24 1543   Wound Description Slough;Yellow;Pink;Epithelialization 08/07/24 1533   Emely-wound Assessment Pink;Maceration 08/07/24 1533   Wound Length (cm) 3 cm 08/07/24 1533   Wound Width (cm) 2.6 cm 08/07/24 1533   Wound Depth (cm) 0.1 cm 08/07/24 1533   Wound Surface Area (cm^2) 7.8 cm^2 08/07/24 1533   Wound Volume (cm^3) 0.78 cm^3 08/07/24 1533   Calculated Wound Volume (cm^3) 0.78 cm^3 08/07/24 1533   Change in Wound Size % 73.65 08/07/24 1533   Drainage Amount Moderate 08/07/24 1533   Drainage Description Serosanguineous;Yellow 08/07/24 1533   Non-staged Wound Description Full thickness 08/07/24 1533   Dressing Status Intact 08/07/24 1533                MRI prostate multiparametric wo w contrast    Result Date: 8/1/2024  Narrative: MULTIPARAMETRIC MRI OF THE PROSTATE WITH AND WITHOUT CONTRAST-WITH 3-D POSTPROCESSING INDICATION: N41.2: Abscess of prostate. Prostatic abscess. PSA LEVEL:  1.16 ng/mL on 8/26/2023. PRIOR BIOPSY DATE: No prior biopsy. BIOPSY RESULTS: Not applicable. COMPARISON: MRI prostate 7/1/2024. TECHNIQUE: Multiparametric MRI of the prostate was performed with and without contrast on a 3T MRI. CONTRAST: Gadobutrol (Gadavist) 8 mL of Gadobutrol injection (SINGLE-DOSE) TECHNICAL LIMITATIONS: None. FINDINGS: PROSTATE: Size: 3.3 x 4.6 x 3.7 cm = 29.4 mL. PSAD= 0.039 ng/mL2 Hemorrhage: None. Benign prostatic hyperplasia (BPH): Mild. A 2.2 x 2.3 x 1.8 cm mildly T1 hypointense, intermediate T2 signal intensity, diffusion restricting, rim-enhancing lesion in the left mid to apex prostate involving both the transition zone and the peripheral zone (5/25, 3/21), previously measured 2.0 x 1.9 x 1.8 cm on MRI 7/1/2024, again suspicious for prostatic abscess in the appropriate clinical setting. A 2.0 x 1.7 x 1.6 cm mildly T1 hypointense, intermediate T2 signal intensity, diffusion restricting, or enhancing lesion in the right mid to apex prostate involving the transition zone and the peripheral zone (5/24, 3/19), previously 1.7 x 1.7 x 1.3 cm on MRI 7/1/2024, again suspicious for prostatic abscess in the appropriate clinical setting. Evaluation for prostate malignancy is limited in the setting of prostatitis/prostatic abscess. However, no dominant lesion is apparent. Note: Clinically significant cancer is defined on pathology/histology as [place] score greater than or equal to 7, and/or volume of greater than or equal to 0.5 mL, and/or extraprostatic extension. SEMINAL VESICLES : Unremarkable. URINARY BLADDER: Partially distended. Circumferential bladder wall thickening. Findings of discitis/osteomyelitis at L5-S1 and the fluid collection along the left lateral aspect of the L5-S1 disc and epidural space are better characterized on recent MRI lumbar spine 6/27/2024. LYMPH NODES: No pelvic lymphadenopathy. BONES: No suspicious osseous lesion. Susceptibility artifact from lumbar fusion hardware.  OTHER: Trace free pelvic fluid, slightly decreased since prior study.     Impression: 1. Bilateral prostatic abscesses measuring up to 2.3 cm on the left and 2.0 cm on the right, similar to slightly increased in size since prior MRI 7/1/2024 2. Again, evaluation for prostatic malignancy is limited in the setting of prostatitis/prostatic abscess. Consider follow-up imaging as clinically warranted to confirm resolution of the prostatic abscesses. 3. Circumferential bladder wall thickening, which may be due to underdistention alone or in combination with cystitis. Recommend correlation with urinalysis. 4. Calculated prostate volume of 29.4 mL. The study was marked in EPIC for significant notification. Workstation performed: [id number]      VAS VENOUS DUPLEX - LOWER LIMB BILATERAL    Result Date: 7/15/2024  Narrative:  [facility] REPORT [facility]INICAL: Indications: Localized edema [R60.0].  Patient presents with bilateral lower extremity edema since last week. Operative History: No known cardiovascular surgeries   CONCLUSION:  Impression: RIGHT LOWER LIMB: No evidence of acute or chronic deep vein thrombosis. No evidence of superficial thrombophlebitis noted. Doppler evaluation shows a normal response to augmentation maneuvers. Popliteal, posterior tibial and anterior tibial arterial Doppler waveform's are triphasic. Tech Note: There is an echogenic structure located in the inguinal region measuring approximately 1.88cm suggestive of enlarged lymphatic channels.  LEFT LOWER LIMB: No evidence of acute or chronic deep vein thrombosis. No evidence of superficial thrombophlebitis noted. Doppler evaluation shows a normal response to augmentation maneuvers. Popliteal, posterior tibial and anterior tibial arterial Doppler waveform's are triphasic.  Technical findings were faxed to the patient's chart.  SIGNATURE: Electronically Signed by: SADAF MCCOY MD on 2024-07-15 03:28:45 PM    Echo complete w/ contrast if  "indicated    Result Date: 7/15/2024  Narrative:   Left Ventricle: Left ventricular cavity size is normal. Wall thickness is normal. The left ventricular ejection fraction is 65%. Systolic function is normal. Wall motion is normal. Diastolic function is mildly abnormal, consistent with grade I (abnormal) relaxation.   Mitral Valve: There is mild annular calcification. There is trace regurgitation.   Tricuspid Valve: There is mild regurgitation.   Pulmonary Artery: The pulmonary artery systolic pressure is normal.     XR chest 1 view portable    Result Date: 7/13/2024  Narrative: XR CHEST PORTABLE INDICATION: leg swelling. COMPARISON: CXR and abdomen CT 6/27/2024 and chest CT 4/17/2023. FINDINGS: Right PICC in mid SVC. Mild pulmonary venous congestion. No pneumothorax or pleural effusion. Mild calcification over the right midlung is in the anterior chest wall on CT. Normal cardiomediastinal silhouette. Bones are unremarkable for age. Barely imaged thoracolumbar fusion hardware. Left shoulder arthroplasty. Normal upper abdomen.     Impression: Mild pulmonary venous congestion. Workstation performed: [id number]       Wound Instructions:  Orders Placed This Encounter   Procedures    Debridement     This order was created via procedure documentation    Debridement     This order was created via procedure documentation         Yoly Ward DPM, GARRICK, FACFAS    Portions of the record may have been created with voice recognition software. Occasional wrong word or \"sound a like\" substitutions may have occurred due to the inherent limitations of voice recognition software. Read the chart carefully and recognize, using context, where substitutions have occurred.    "

## 2024-08-07 NOTE — PROGRESS NOTES
Wound Procedure Treatment    Performed by: Zaida Mtz RN  Authorized by: Yoly Ward DPM    Associated wounds:   Wound 07/13/24 Venous Ulcer Foot Anterior;Right  Wound 07/13/24 Venous Ulcer Pretibial Distal;Right  Wound cleansed with:  NSS  Applied primary dressing:  Dermagran  Applied secondary dressing:  Gauze  Dressing secured with:  Luiza, Tape, Elastic tubular stocking and Size F

## 2024-08-13 DIAGNOSIS — Z98.1 S/P LUMBAR FUSION: ICD-10-CM

## 2024-08-13 RX ORDER — TAMSULOSIN HYDROCHLORIDE 0.4 MG/1
0.4 CAPSULE ORAL
Qty: 90 CAPSULE | Refills: 1 | Status: SHIPPED | OUTPATIENT
Start: 2024-08-13

## 2024-08-15 ENCOUNTER — HOSPITAL ENCOUNTER (INPATIENT)
Facility: HOSPITAL | Age: 69
LOS: 5 days | Discharge: HOME | DRG: 559 | End: 2024-08-20
Attending: EMERGENCY MEDICINE | Admitting: FAMILY MEDICINE
Payer: MEDICARE

## 2024-08-15 ENCOUNTER — APPOINTMENT (EMERGENCY)
Dept: RADIOLOGY | Facility: HOSPITAL | Age: 69
DRG: 559 | End: 2024-08-15
Payer: MEDICARE

## 2024-08-15 ENCOUNTER — APPOINTMENT (EMERGENCY)
Dept: CT IMAGING | Facility: HOSPITAL | Age: 69
DRG: 559 | End: 2024-08-15
Payer: MEDICARE

## 2024-08-15 DIAGNOSIS — A41.9 SEVERE SEPSIS (HCC): ICD-10-CM

## 2024-08-15 DIAGNOSIS — M46.47 DISCITIS OF LUMBOSACRAL REGION: ICD-10-CM

## 2024-08-15 DIAGNOSIS — I83.009 VENOUS STASIS ULCERS (HCC): ICD-10-CM

## 2024-08-15 DIAGNOSIS — R78.81 BACTEREMIA: ICD-10-CM

## 2024-08-15 DIAGNOSIS — R11.2 NAUSEA AND VOMITING: Primary | ICD-10-CM

## 2024-08-15 DIAGNOSIS — L97.909 VENOUS STASIS ULCERS (HCC): ICD-10-CM

## 2024-08-15 DIAGNOSIS — R65.20 SEVERE SEPSIS (HCC): ICD-10-CM

## 2024-08-15 DIAGNOSIS — I10 PRIMARY HYPERTENSION: ICD-10-CM

## 2024-08-15 DIAGNOSIS — A41.9 SEPSIS WITHOUT ACUTE ORGAN DYSFUNCTION, DUE TO UNSPECIFIED ORGANISM (HCC): ICD-10-CM

## 2024-08-15 DIAGNOSIS — N41.1 CHRONIC PROSTATITIS: ICD-10-CM

## 2024-08-15 LAB
2HR DELTA HS TROPONIN: 1 NG/L
ALBUMIN SERPL BCG-MCNC: 4.6 G/DL (ref 3.5–5)
ALP SERPL-CCNC: 82 U/L (ref 34–104)
ALT SERPL W P-5'-P-CCNC: 7 U/L (ref 7–52)
ANION GAP SERPL CALCULATED.3IONS-SCNC: 17 MMOL/L (ref 4–13)
ANISOCYTOSIS BLD QL SMEAR: PRESENT
APTT PPP: 33 SECONDS (ref 23–34)
AST SERPL W P-5'-P-CCNC: 13 U/L (ref 13–39)
BACTERIA UR QL AUTO: NORMAL /HPF
BASOPHILS # BLD MANUAL: 0 THOUSAND/UL (ref 0–0.1)
BASOPHILS NFR MAR MANUAL: 0 % (ref 0–1)
BILIRUB SERPL-MCNC: 0.49 MG/DL (ref 0.2–1)
BILIRUB UR QL STRIP: NEGATIVE
BUN SERPL-MCNC: 19 MG/DL (ref 5–25)
CALCIUM SERPL-MCNC: 9.9 MG/DL (ref 8.4–10.2)
CARDIAC TROPONIN I PNL SERPL HS: 8 NG/L
CARDIAC TROPONIN I PNL SERPL HS: 9 NG/L
CHLORIDE SERPL-SCNC: 98 MMOL/L (ref 96–108)
CLARITY UR: CLEAR
CO2 SERPL-SCNC: 24 MMOL/L (ref 21–32)
COLOR UR: ABNORMAL
CREAT SERPL-MCNC: 0.85 MG/DL (ref 0.6–1.3)
CRP SERPL QL: 11.5 MG/L
EOSINOPHIL # BLD MANUAL: 0 THOUSAND/UL (ref 0–0.4)
EOSINOPHIL NFR BLD MANUAL: 0 % (ref 0–6)
ERYTHROCYTE [DISTWIDTH] IN BLOOD BY AUTOMATED COUNT: 18.3 % (ref 11.6–15.1)
ERYTHROCYTE [SEDIMENTATION RATE] IN BLOOD: 32 MM/HOUR (ref 0–19)
FLUAV RNA RESP QL NAA+PROBE: NEGATIVE
FLUBV RNA RESP QL NAA+PROBE: NEGATIVE
GFR SERPL CREATININE-BSD FRML MDRD: 90 ML/MIN/1.73SQ M
GLUCOSE SERPL-MCNC: 170 MG/DL (ref 65–140)
GLUCOSE SERPL-MCNC: 188 MG/DL (ref 65–140)
GLUCOSE UR STRIP-MCNC: ABNORMAL MG/DL
HCT VFR BLD AUTO: 35.5 % (ref 36.5–49.3)
HGB BLD-MCNC: 11.2 G/DL (ref 12–17)
HGB UR QL STRIP.AUTO: ABNORMAL
INR PPP: 0.99 (ref 0.85–1.19)
KETONES UR STRIP-MCNC: ABNORMAL MG/DL
LACTATE SERPL-SCNC: 2.8 MMOL/L (ref 0.5–2)
LACTATE SERPL-SCNC: 4.7 MMOL/L (ref 0.5–2)
LEUKOCYTE ESTERASE UR QL STRIP: ABNORMAL
LIPASE SERPL-CCNC: 9 U/L (ref 11–82)
LYMPHOCYTES # BLD AUTO: 0.39 THOUSAND/UL (ref 0.6–4.47)
LYMPHOCYTES # BLD AUTO: 3 % (ref 14–44)
MCH RBC QN AUTO: 24.9 PG (ref 26.8–34.3)
MCHC RBC AUTO-ENTMCNC: 31.5 G/DL (ref 31.4–37.4)
MCV RBC AUTO: 79 FL (ref 82–98)
MONOCYTES # BLD AUTO: 0 THOUSAND/UL (ref 0–1.22)
MONOCYTES NFR BLD: 0 % (ref 4–12)
MUCOUS THREADS UR QL AUTO: NORMAL
NEUTROPHILS # BLD MANUAL: 12.56 THOUSAND/UL (ref 1.85–7.62)
NEUTS SEG NFR BLD AUTO: 97 % (ref 43–75)
NITRITE UR QL STRIP: NEGATIVE
NON-SQ EPI CELLS URNS QL MICRO: NORMAL /HPF
PH UR STRIP.AUTO: 6 [PH]
PLATELET # BLD AUTO: 458 THOUSANDS/UL (ref 149–390)
PLATELET BLD QL SMEAR: ADEQUATE
PMV BLD AUTO: 10.5 FL (ref 8.9–12.7)
POTASSIUM SERPL-SCNC: 3.5 MMOL/L (ref 3.5–5.3)
PROCALCITONIN SERPL-MCNC: 0.07 NG/ML
PROT SERPL-MCNC: 7.9 G/DL (ref 6.4–8.4)
PROT UR STRIP-MCNC: ABNORMAL MG/DL
PROTHROMBIN TIME: 13.6 SECONDS (ref 12.3–15)
RBC # BLD AUTO: 4.5 MILLION/UL (ref 3.88–5.62)
RBC #/AREA URNS AUTO: NORMAL /HPF
RBC MORPH BLD: PRESENT
RSV RNA RESP QL NAA+PROBE: NEGATIVE
SARS-COV-2 RNA RESP QL NAA+PROBE: NEGATIVE
SODIUM SERPL-SCNC: 139 MMOL/L (ref 135–147)
SP GR UR STRIP.AUTO: 1.01 (ref 1–1.03)
UROBILINOGEN UR STRIP-ACNC: <2 MG/DL
WBC # BLD AUTO: 12.95 THOUSAND/UL (ref 4.31–10.16)
WBC #/AREA URNS AUTO: NORMAL /HPF

## 2024-08-15 PROCEDURE — 82948 REAGENT STRIP/BLOOD GLUCOSE: CPT

## 2024-08-15 PROCEDURE — 71045 X-RAY EXAM CHEST 1 VIEW: CPT

## 2024-08-15 PROCEDURE — 99223 1ST HOSP IP/OBS HIGH 75: CPT | Performed by: PHYSICIAN ASSISTANT

## 2024-08-15 PROCEDURE — 87040 BLOOD CULTURE FOR BACTERIA: CPT | Performed by: PHYSICIAN ASSISTANT

## 2024-08-15 PROCEDURE — 85007 BL SMEAR W/DIFF WBC COUNT: CPT | Performed by: EMERGENCY MEDICINE

## 2024-08-15 PROCEDURE — 93005 ELECTROCARDIOGRAM TRACING: CPT

## 2024-08-15 PROCEDURE — 84484 ASSAY OF TROPONIN QUANT: CPT | Performed by: PHYSICIAN ASSISTANT

## 2024-08-15 PROCEDURE — 81001 URINALYSIS AUTO W/SCOPE: CPT | Performed by: PHYSICIAN ASSISTANT

## 2024-08-15 PROCEDURE — 80053 COMPREHEN METABOLIC PANEL: CPT | Performed by: EMERGENCY MEDICINE

## 2024-08-15 PROCEDURE — 0241U HB NFCT DS VIR RESP RNA 4 TRGT: CPT | Performed by: PHYSICIAN ASSISTANT

## 2024-08-15 PROCEDURE — 99285 EMERGENCY DEPT VISIT HI MDM: CPT

## 2024-08-15 PROCEDURE — 96361 HYDRATE IV INFUSION ADD-ON: CPT

## 2024-08-15 PROCEDURE — 85027 COMPLETE CBC AUTOMATED: CPT | Performed by: EMERGENCY MEDICINE

## 2024-08-15 PROCEDURE — 96375 TX/PRO/DX INJ NEW DRUG ADDON: CPT

## 2024-08-15 PROCEDURE — 96365 THER/PROPH/DIAG IV INF INIT: CPT

## 2024-08-15 PROCEDURE — 85652 RBC SED RATE AUTOMATED: CPT | Performed by: PHYSICIAN ASSISTANT

## 2024-08-15 PROCEDURE — 83605 ASSAY OF LACTIC ACID: CPT | Performed by: PHYSICIAN ASSISTANT

## 2024-08-15 PROCEDURE — 72132 CT LUMBAR SPINE W/DYE: CPT

## 2024-08-15 PROCEDURE — 86140 C-REACTIVE PROTEIN: CPT | Performed by: PHYSICIAN ASSISTANT

## 2024-08-15 PROCEDURE — 96367 TX/PROPH/DG ADDL SEQ IV INF: CPT

## 2024-08-15 PROCEDURE — 84145 PROCALCITONIN (PCT): CPT | Performed by: PHYSICIAN ASSISTANT

## 2024-08-15 PROCEDURE — 85610 PROTHROMBIN TIME: CPT | Performed by: PHYSICIAN ASSISTANT

## 2024-08-15 PROCEDURE — 83690 ASSAY OF LIPASE: CPT | Performed by: EMERGENCY MEDICINE

## 2024-08-15 PROCEDURE — 99285 EMERGENCY DEPT VISIT HI MDM: CPT | Performed by: PHYSICIAN ASSISTANT

## 2024-08-15 PROCEDURE — 85730 THROMBOPLASTIN TIME PARTIAL: CPT | Performed by: PHYSICIAN ASSISTANT

## 2024-08-15 PROCEDURE — 36415 COLL VENOUS BLD VENIPUNCTURE: CPT

## 2024-08-15 RX ORDER — METOCLOPRAMIDE HYDROCHLORIDE 5 MG/ML
10 INJECTION INTRAMUSCULAR; INTRAVENOUS ONCE
Status: COMPLETED | OUTPATIENT
Start: 2024-08-15 | End: 2024-08-15

## 2024-08-15 RX ORDER — HYDROMORPHONE HCL/PF 1 MG/ML
0.5 SYRINGE (ML) INJECTION EVERY 2 HOUR PRN
Status: DISCONTINUED | OUTPATIENT
Start: 2024-08-15 | End: 2024-08-20 | Stop reason: HOSPADM

## 2024-08-15 RX ORDER — LACTOBACILLUS ACIDOPHILUS / LACTOBACILLUS BULGARICUS 100 MILLION CFU STRENGTH
1 GRANULES ORAL
Status: DISCONTINUED | OUTPATIENT
Start: 2024-08-15 | End: 2024-08-20 | Stop reason: HOSPADM

## 2024-08-15 RX ORDER — METHOCARBAMOL 500 MG/1
750 TABLET, FILM COATED ORAL EVERY 6 HOURS PRN
Status: DISCONTINUED | OUTPATIENT
Start: 2024-08-15 | End: 2024-08-20 | Stop reason: HOSPADM

## 2024-08-15 RX ORDER — NIFEDIPINE 30 MG/1
30 TABLET, EXTENDED RELEASE ORAL DAILY
Status: DISCONTINUED | OUTPATIENT
Start: 2024-08-16 | End: 2024-08-19

## 2024-08-15 RX ORDER — GABAPENTIN 100 MG/1
100 CAPSULE ORAL DAILY
Status: DISCONTINUED | OUTPATIENT
Start: 2024-08-16 | End: 2024-08-20 | Stop reason: HOSPADM

## 2024-08-15 RX ORDER — LABETALOL HYDROCHLORIDE 5 MG/ML
10 INJECTION, SOLUTION INTRAVENOUS ONCE
Status: COMPLETED | OUTPATIENT
Start: 2024-08-15 | End: 2024-08-15

## 2024-08-15 RX ORDER — CEFEPIME HYDROCHLORIDE 2 G/50ML
2000 INJECTION, SOLUTION INTRAVENOUS EVERY 12 HOURS
Status: DISCONTINUED | OUTPATIENT
Start: 2024-08-16 | End: 2024-08-15

## 2024-08-15 RX ORDER — MORPHINE SULFATE 4 MG/ML
4 INJECTION, SOLUTION INTRAMUSCULAR; INTRAVENOUS ONCE
Status: COMPLETED | OUTPATIENT
Start: 2024-08-15 | End: 2024-08-15

## 2024-08-15 RX ORDER — METOPROLOL SUCCINATE 50 MG/1
50 TABLET, EXTENDED RELEASE ORAL 2 TIMES DAILY
Status: DISCONTINUED | OUTPATIENT
Start: 2024-08-15 | End: 2024-08-20 | Stop reason: HOSPADM

## 2024-08-15 RX ORDER — ONDANSETRON 2 MG/ML
4 INJECTION INTRAMUSCULAR; INTRAVENOUS EVERY 6 HOURS PRN
Status: DISCONTINUED | OUTPATIENT
Start: 2024-08-15 | End: 2024-08-19

## 2024-08-15 RX ORDER — ACETAMINOPHEN 325 MG/1
650 TABLET ORAL EVERY 6 HOURS PRN
Status: DISCONTINUED | OUTPATIENT
Start: 2024-08-15 | End: 2024-08-20 | Stop reason: HOSPADM

## 2024-08-15 RX ORDER — INSULIN LISPRO 100 [IU]/ML
1-6 INJECTION, SOLUTION INTRAVENOUS; SUBCUTANEOUS
Status: DISCONTINUED | OUTPATIENT
Start: 2024-08-16 | End: 2024-08-20 | Stop reason: HOSPADM

## 2024-08-15 RX ORDER — CEFEPIME HYDROCHLORIDE 2 G/50ML
2000 INJECTION, SOLUTION INTRAVENOUS ONCE
Status: COMPLETED | OUTPATIENT
Start: 2024-08-15 | End: 2024-08-15

## 2024-08-15 RX ORDER — VANCOMYCIN HYDROCHLORIDE 1 G/200ML
1000 INJECTION, SOLUTION INTRAVENOUS EVERY 12 HOURS
Status: DISCONTINUED | OUTPATIENT
Start: 2024-08-16 | End: 2024-08-18

## 2024-08-15 RX ORDER — FINASTERIDE 5 MG/1
5 TABLET, FILM COATED ORAL DAILY
Status: DISCONTINUED | OUTPATIENT
Start: 2024-08-16 | End: 2024-08-20 | Stop reason: HOSPADM

## 2024-08-15 RX ORDER — PRAVASTATIN SODIUM 20 MG
20 TABLET ORAL
Status: DISCONTINUED | OUTPATIENT
Start: 2024-08-15 | End: 2024-08-20 | Stop reason: HOSPADM

## 2024-08-15 RX ORDER — OXYCODONE HYDROCHLORIDE 10 MG/1
10 TABLET ORAL EVERY 4 HOURS PRN
Status: DISCONTINUED | OUTPATIENT
Start: 2024-08-15 | End: 2024-08-20 | Stop reason: HOSPADM

## 2024-08-15 RX ORDER — OXYCODONE HYDROCHLORIDE 5 MG/1
5 TABLET ORAL EVERY 4 HOURS PRN
Status: DISCONTINUED | OUTPATIENT
Start: 2024-08-15 | End: 2024-08-20 | Stop reason: HOSPADM

## 2024-08-15 RX ORDER — SODIUM CHLORIDE 9 MG/ML
125 INJECTION, SOLUTION INTRAVENOUS CONTINUOUS
Status: DISCONTINUED | OUTPATIENT
Start: 2024-08-15 | End: 2024-08-19

## 2024-08-15 RX ORDER — SENNOSIDES 8.6 MG
8.6 TABLET ORAL 2 TIMES DAILY
Status: DISCONTINUED | OUTPATIENT
Start: 2024-08-15 | End: 2024-08-20 | Stop reason: HOSPADM

## 2024-08-15 RX ORDER — NIFEDIPINE 10 MG/1
30 CAPSULE ORAL ONCE
Status: COMPLETED | OUTPATIENT
Start: 2024-08-15 | End: 2024-08-15

## 2024-08-15 RX ORDER — INSULIN LISPRO 100 [IU]/ML
1-6 INJECTION, SOLUTION INTRAVENOUS; SUBCUTANEOUS
Status: DISCONTINUED | OUTPATIENT
Start: 2024-08-15 | End: 2024-08-20 | Stop reason: HOSPADM

## 2024-08-15 RX ORDER — TAMSULOSIN HYDROCHLORIDE 0.4 MG/1
0.4 CAPSULE ORAL
Status: DISCONTINUED | OUTPATIENT
Start: 2024-08-15 | End: 2024-08-20 | Stop reason: HOSPADM

## 2024-08-15 RX ORDER — CEFEPIME HYDROCHLORIDE 2 G/50ML
2000 INJECTION, SOLUTION INTRAVENOUS EVERY 8 HOURS
Status: DISCONTINUED | OUTPATIENT
Start: 2024-08-16 | End: 2024-08-20 | Stop reason: HOSPADM

## 2024-08-15 RX ORDER — MIRTAZAPINE 15 MG/1
45 TABLET, FILM COATED ORAL
Status: DISCONTINUED | OUTPATIENT
Start: 2024-08-15 | End: 2024-08-20 | Stop reason: HOSPADM

## 2024-08-15 RX ORDER — LABETALOL HYDROCHLORIDE 5 MG/ML
10 INJECTION, SOLUTION INTRAVENOUS ONCE
Status: DISCONTINUED | OUTPATIENT
Start: 2024-08-15 | End: 2024-08-15

## 2024-08-15 RX ORDER — NIFEDIPINE 30 MG/1
90 TABLET, EXTENDED RELEASE ORAL EVERY MORNING
Status: DISCONTINUED | OUTPATIENT
Start: 2024-08-16 | End: 2024-08-19

## 2024-08-15 RX ORDER — HYDROXYZINE HYDROCHLORIDE 25 MG/1
50 TABLET, FILM COATED ORAL 2 TIMES DAILY PRN
Status: DISCONTINUED | OUTPATIENT
Start: 2024-08-15 | End: 2024-08-20 | Stop reason: HOSPADM

## 2024-08-15 RX ORDER — POLYETHYLENE GLYCOL 3350 17 G/17G
17 POWDER, FOR SOLUTION ORAL DAILY PRN
Status: DISCONTINUED | OUTPATIENT
Start: 2024-08-15 | End: 2024-08-20 | Stop reason: HOSPADM

## 2024-08-15 RX ORDER — ESCITALOPRAM OXALATE 20 MG/1
20 TABLET ORAL DAILY
Status: DISCONTINUED | OUTPATIENT
Start: 2024-08-16 | End: 2024-08-20 | Stop reason: HOSPADM

## 2024-08-15 RX ORDER — CLONAZEPAM 1 MG/1
1 TABLET ORAL 2 TIMES DAILY
Status: DISCONTINUED | OUTPATIENT
Start: 2024-08-15 | End: 2024-08-20 | Stop reason: HOSPADM

## 2024-08-15 RX ORDER — HYDROMORPHONE HCL/PF 1 MG/ML
1 SYRINGE (ML) INJECTION ONCE
Status: COMPLETED | OUTPATIENT
Start: 2024-08-15 | End: 2024-08-15

## 2024-08-15 RX ORDER — LIDOCAINE 50 MG/G
1 PATCH TOPICAL DAILY
Status: DISCONTINUED | OUTPATIENT
Start: 2024-08-16 | End: 2024-08-20 | Stop reason: HOSPADM

## 2024-08-15 RX ADMIN — VANCOMYCIN HYDROCHLORIDE 1500 MG: 5 INJECTION, POWDER, LYOPHILIZED, FOR SOLUTION INTRAVENOUS at 16:08

## 2024-08-15 RX ADMIN — CEFEPIME HYDROCHLORIDE 2000 MG: 2 INJECTION, SOLUTION INTRAVENOUS at 15:47

## 2024-08-15 RX ADMIN — SODIUM CHLORIDE 1000 ML: 0.9 INJECTION, SOLUTION INTRAVENOUS at 14:46

## 2024-08-15 RX ADMIN — CEFEPIME HYDROCHLORIDE 2000 MG: 2 INJECTION, SOLUTION INTRAVENOUS at 23:55

## 2024-08-15 RX ADMIN — SODIUM CHLORIDE 409 ML: 0.9 INJECTION, SOLUTION INTRAVENOUS at 16:19

## 2024-08-15 RX ADMIN — MORPHINE SULFATE 4 MG: 4 INJECTION INTRAVENOUS at 15:07

## 2024-08-15 RX ADMIN — METOCLOPRAMIDE 10 MG: 5 INJECTION, SOLUTION INTRAMUSCULAR; INTRAVENOUS at 14:46

## 2024-08-15 RX ADMIN — INSULIN LISPRO 1 UNITS: 100 INJECTION, SOLUTION INTRAVENOUS; SUBCUTANEOUS at 23:53

## 2024-08-15 RX ADMIN — METHOCARBAMOL TABLETS 750 MG: 500 TABLET, COATED ORAL at 20:04

## 2024-08-15 RX ADMIN — LACTOBACILLUS ACIDOPHILUS / LACTOBACILLUS BULGARICUS 1 PACKET: 100 MILLION CFU STRENGTH GRANULES at 20:02

## 2024-08-15 RX ADMIN — METOPROLOL SUCCINATE 50 MG: 50 TABLET, EXTENDED RELEASE ORAL at 20:03

## 2024-08-15 RX ADMIN — IOHEXOL 100 ML: 350 INJECTION, SOLUTION INTRAVENOUS at 14:52

## 2024-08-15 RX ADMIN — HYDROMORPHONE HYDROCHLORIDE 1 MG: 1 INJECTION, SOLUTION INTRAMUSCULAR; INTRAVENOUS; SUBCUTANEOUS at 16:23

## 2024-08-15 RX ADMIN — SODIUM CHLORIDE 1000 ML: 0.9 INJECTION, SOLUTION INTRAVENOUS at 15:56

## 2024-08-15 RX ADMIN — MIRTAZAPINE 45 MG: 15 TABLET, FILM COATED ORAL at 22:36

## 2024-08-15 RX ADMIN — OXYCODONE HYDROCHLORIDE 10 MG: 10 TABLET ORAL at 20:03

## 2024-08-15 RX ADMIN — NIFEDIPINE 30 MG: 10 CAPSULE ORAL at 22:37

## 2024-08-15 RX ADMIN — SODIUM CHLORIDE 125 ML/HR: 0.9 INJECTION, SOLUTION INTRAVENOUS at 20:05

## 2024-08-15 RX ADMIN — PRAVASTATIN SODIUM 20 MG: 20 TABLET ORAL at 20:03

## 2024-08-15 RX ADMIN — SENNOSIDES 8.6 MG: 8.6 TABLET, FILM COATED ORAL at 20:02

## 2024-08-15 RX ADMIN — TAMSULOSIN HYDROCHLORIDE 0.4 MG: 0.4 CAPSULE ORAL at 20:02

## 2024-08-15 RX ADMIN — LABETALOL HYDROCHLORIDE 10 MG: 5 INJECTION, SOLUTION INTRAVENOUS at 20:04

## 2024-08-15 RX ADMIN — CLONAZEPAM 1 MG: 1 TABLET ORAL at 20:03

## 2024-08-15 NOTE — ASSESSMENT & PLAN NOTE
Severely elevated blood pressures into the 220s  Multifactorial as he did not take his home antihypertensive this morning and he is in severe back pain  Treat pain with analgesic regimen  Resume home regimen of Procardia 120 mg daily, Toprol-XL 50 mg twice daily  Monitor blood pressure trend closely   details…

## 2024-08-15 NOTE — ED PROVIDER NOTES
History  Chief Complaint   Patient presents with    Vomiting     Patient reports to ED c/o nausea and vomiting since this morning. Unable to keep food, fluids or medication down. Reports back pain that radiates down bilateral legs, hx of back surgery.      Patient is a 70 y/o M with h/o DM, HTN, chronic back pain that presents to the ED with nausea, vomiting that started this morning. He states he has low back pain radiating into his pelvis.  He denies bowel/bladder incontinence or new weakness.  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  He is followed by DR. Call of [facility].  He states he is scheduled to get surgery Aug 27 to remove the hardware.  He has been on IV doxycycline, his last dose was yesterday and he is now getting oral doxycycline.  No fevers, but he has chills.  He denies sick contacts.        History provided by:  Patient  Vomiting  Associated symptoms: chills    Associated symptoms: no abdominal pain, no cough, no diarrhea and no fever        Prior to Admission Medications   Prescriptions Last Dose Informant Patient Reported? Taking?   ACCU-CHEK FABIANO PLUS test strip  Spouse/Significant Other Yes No   Si (four) times a day   ACCU-CHEK FASTCLIX LANCETS MISC  Spouse/Significant Other Yes No   Si (four) times a day   Azelastine HCl 137 MCG/SPRAY SOLN  Spouse/Significant Other Yes No   Si spray into each nostril Every 12 hours   DAPTOMYCIN IV  Spouse/Significant Other Yes No   Sig: Inject 480 mg into a catheter in a vein daily   GNP Aspirin Low Dose 81 MG EC tablet  Spouse/Significant Other Yes No   Sig: Take 1 tablet (81 mg total) by mouth daily Do not start before 2023.   Jardiance 10 MG TABS  Spouse/Significant Other Yes No   Sig: Take 10 mg by mouth every morning   Lactobacillus Acid-Pectin (Acidophilus/Pectin) CAPS   Yes No   Sig: Take 1 capsule by mouth daily. Dr Carmenza Szymanski  Indications: 52 days while on abx    NIFEdipine (PROCARDIA XL) 30 mg 24 hr tablet  Spouse/Significant Other Yes No   Sig: TAKE 1 TABLET BY MOUTH EVERY DAY IN ADDITION TO 90MG FOR A TOTAL OF 120MG EVERY DAY   NIFEdipine (PROCARDIA XL) 90 mg 24 hr tablet  Spouse/Significant Other Yes No   Sig: Take 120 mg by mouth every morning Takes 90 mg and 30 mg =120 mg every AM   Sodium Chloride Flush (Normal Saline Flush) 0.9 % SOLN   Yes No   Sig: Inject 10-20 mL into a catheter in a vein every 8 (eight) hours as needed (Before after antibiotic and as needed for patency). Indications: .   VANCOMYCIN HCL IV   Yes No   Sig: Inject 1,000 mg into a catheter in a vein every 12 (twelve) hours. Dr. BETTY Sales [facility] 100 ml NSS over 1 hour done by [facility] [phone]  Indications: .   Patient not taking: Reported on 2024   acetaminophen (TYLENOL) 500 mg tablet  Spouse/Significant Other No No   Sig: Take 2 tablets (1,000 mg total) by mouth every 8 (eight) hours 500 mg tablet   clonazePAM (KlonoPIN) 1 mg tablet  Spouse/Significant Other Yes No   Sig: Take 1 mg by mouth 2 (two) times a day & 3rd pill PRN   doxycycline hyclate (VIBRAMYCIN) 100 mg capsule   Yes No   Sig: Take 100 mg by mouth 2 (two) times a day Do not start before August 10, 2024.   escitalopram (Lexapro) 20 mg tablet   Yes No   Sig: Take 20 mg by mouth daily. Indications: .   finasteride (PROSCAR) 5 mg tablet   No No   Sig: TAKE 1 TABLET BY MOUTH DAILY   fluticasone (FLONASE) 50 mcg/act nasal spray  Spouse/Significant Other Yes No   Si spray into each nostril daily as needed   folic acid (FOLVITE) 1 mg tablet  Spouse/Significant Other Yes No   Sig: Take 2,000 mcg by mouth daily   furosemide (LASIX) 20 mg tablet   No No   Sig: Take 1 tablet (20 mg total) by mouth daily as needed (for weight gain more than 3 lbs overnight or more than 5 lbs in a week, or lower extremity swelling)   gabapentin (NEURONTIN) 100 mg capsule   No No   Sig: Take 1 capsule (100 mg total) by mouth daily   hydrOXYzine  pamoate (VISTARIL) 50 mg capsule  Spouse/Significant Other Yes No   Sig: Take 50 mg by mouth 2 (two) times a day. Indications: Feeling Anxious   ketoconazole (NIZORAL) 2 % shampoo  Spouse/Significant Other Yes No   Sig: Apply 1 application. topically daily Apply to scalp   lidocaine (LIDODERM) 5 %  Spouse/Significant Other No No   Sig: Apply 1 patch topically over 12 hours daily Remove & Discard patch within 12 hours or as directed by MD   loratadine (CLARITIN) 10 mg tablet  Spouse/Significant Other Yes No   Sig: [facility] 10 [facility]  Indications: Hayfever   lovastatin (MEVACOR) 20 mg tablet  Spouse/Significant Other Yes No   Sig: Take 20 mg by mouth every morning   metFORMIN (GLUCOPHAGE) 1000 MG tablet  Spouse/Significant Other Yes No   Sig: Take 1,000 mg by mouth 2 (two) times a day with meals    methocarbamol (ROBAXIN) 750 mg tablet  Spouse/Significant Other No No   Sig: Take 1 tablet (750 mg total) by mouth every 6 (six) hours as needed for muscle spasms   metoprolol succinate (TOPROL-XL) 50 mg 24 hr tablet  Spouse/Significant Other Yes No   Sig: Take 50 mg by mouth 2 (two) times a day   mirtazapine (REMERON) 45 MG tablet  Spouse/Significant Other Yes No   Sig: Take 45 mg by mouth daily at bedtime   ondansetron (Zofran ODT) 4 mg disintegrating tablet  Spouse/Significant Other No No   Sig: Take 1 tablet (4 mg total) by mouth every 6 (six) hours as needed for nausea or vomiting   oxyCODONE (ROXICODONE) 5 immediate release tablet   Yes No   Sig: Take 5 mg by mouth every 6 (six) hours as needed for severe pain. Dr. Katie MELCHOR  Indications: .   patient supplied medication  Spouse/Significant Other Yes No   Sig: medical marijuana vape as needed per latest dci  Indications: .   senna (SENOKOT) 8.6 mg  Spouse/Significant Other No No   Sig: Take 1 tablet (8.6 mg total) by mouth 2 (two) times a day 2 tabs at bedtime as needed for constipation per latest dci   sertraline  (ZOLOFT) 100 mg tablet  Spouse/Significant Other Yes No   Sig: Take 100 mg by mouth every morning   tamsulosin (FLOMAX) 0.4 mg   No No   Sig: TAKE 1 CAPSULE BY MOUTH DAILY WITH DINNER      Facility-Administered Medications: None       Past Medical History:   Diagnosis Date    Anxiety     Arthritis     Cancer (HCC)     Chronic back pain     Depression     Diabetes mellitus (HCC)     Hypertension     Liver disease     Mitral valve prolapse     Peripheral neuropathy     Neuropathy    PONV (postoperative nausea and vomiting)     Thyroid disease        Past Surgical History:   Procedure Laterality Date    COLONOSCOPY      INCISION AND DRAINAGE OF WOUND Left 2022    Procedure: INCISION AND DRAINAGE (I&D) EXTREMITY;  Surgeon: Moustapha Cote DPM;  Location:  MAIN OR;  Service: Podiatry    IR BIOPSY SPINE  2024    IR BIOPSY SPINE  2024    IR PICC PLACEMENT SINGLE LUMEN  2024    JOINT REPLACEMENT Left 2022    Left TSA    HI ARTHRODESIS POSTERIOR/PSTLAT TQ 1NTRSPC LUMBAR Bilateral 2023    Procedure: L1-S1 navigated posterior decompression with instrumented fixation fusion;  Surgeon: James Moraes MD;  Location:  MAIN OR;  Service: Neurosurgery    THYROID SURGERY      remove cancer       Family History   Problem Relation Age of Onset    No Known Problems Father     No Known Problems Mother     Colon cancer Neg Hx      I have reviewed and agree with the history as documented.    E-Cigarette/Vaping    E-Cigarette Use Current Some Day User     Comments medical marijuana - occ      E-Cigarette/Vaping Substances    Nicotine No     THC Yes     CBD Yes     Flavoring No     Other No     Unknown No      Social History     Tobacco Use    Smoking status: Some Days     Current packs/day: 0.00     Average packs/day: 0.3 packs/day for 5.9 years (1.5 ttl pk-yrs)     Types: Cigarettes     Start date: 1975     Last attempt to quit: 1981     Years since quittin.2    Smokeless tobacco: Never     Tobacco comments:     quit 1981; smokes when in pain as of 5/29/24   Vaping Use    Vaping status: Some Days    Substances: THC, CBD   Substance Use Topics    Alcohol use: Yes    Drug use: Yes     Frequency: 7.0 times per week     Types: Marijuana     Comment: Medical Marijuana, takes for pain, daily, vape       Review of Systems   Constitutional:  Positive for chills. Negative for fever.   Respiratory:  Negative for cough and shortness of breath.    Cardiovascular:  Negative for chest pain.   Gastrointestinal:  Positive for nausea and vomiting. Negative for abdominal pain and diarrhea.   Musculoskeletal:  Positive for back pain.   Skin:  Negative for color change and rash.   Neurological:  Negative for dizziness, weakness, light-headedness and numbness.   Psychiatric/Behavioral:  Negative for confusion.    All other systems reviewed and are negative.      Physical Exam  Physical Exam  Vitals and nursing note reviewed.   Constitutional:       General: He is not in acute distress.     Appearance: Normal appearance. He is well-developed and well-groomed. He is not ill-appearing or diaphoretic.   HENT:      Head: Normocephalic and atraumatic.      Right Ear: Hearing normal.      Left Ear: Hearing normal.      Nose: Nose normal.   Eyes:      Conjunctiva/sclera: Conjunctivae normal.   Cardiovascular:      Rate and Rhythm: Regular rhythm. Tachycardia present.      Heart sounds: Normal heart sounds.   Pulmonary:      Effort: Pulmonary effort is normal.      Breath sounds: Normal breath sounds.   Abdominal:      General: Abdomen is flat. Bowel sounds are normal.      Palpations: Abdomen is soft.      Tenderness: There is no abdominal tenderness.   Musculoskeletal:         General: Normal range of motion.      Cervical back: Normal range of motion.      Right lower leg: No edema.      Left lower leg: No edema.      Comments: Venous stasis ulcer to right foot and ankle.    Skin:     General: Skin is warm and dry.    Neurological:      General: No focal deficit present.      Mental Status: He is alert and oriented to person, place, and time.      Cranial Nerves: No cranial nerve deficit.      Sensory: No sensory deficit.      Motor: No weakness.   Psychiatric:         Behavior: Behavior is cooperative.         Vital Signs  ED Triage Vitals [08/15/24 1359]   Temperature Pulse Respirations Blood Pressure SpO2   (!) 97.1 °F (36.2 °C) 102 20 (!) 189/92 100 %      Temp Source Heart Rate Source Patient Position - Orthostatic VS BP Location FiO2 (%)   Temporal Monitor Sitting Right arm --      Pain Score       10 - Worst Possible Pain           Vitals:    08/15/24 1700 08/15/24 1715 08/15/24 1730 08/15/24 1816   BP: (!) 217/96 (!) 214/85 (!) 220/98 (!) 204/104   Pulse: 96 96 95 99   Patient Position - Orthostatic VS:    Lying         Visual Acuity      ED Medications  Medications   sodium chloride 0.9 % bolus 1,000 mL (0 mL Intravenous Stopped 8/15/24 1607)   metoclopramide (REGLAN) injection 10 mg (10 mg Intravenous Given 8/15/24 1446)   iohexol (OMNIPAQUE) 350 MG/ML injection (MULTI-DOSE) 100 mL (100 mL Intravenous Given 8/15/24 1452)   morphine injection 4 mg (4 mg Intravenous Given 8/15/24 1507)   cefepime (MAXIPIME) IVPB (premix in dextrose) 2,000 mg 50 mL (0 mg Intravenous Stopped 8/15/24 1617)   vancomycin (VANCOCIN) 1500 mg in sodium chloride 0.9% 250 mL IVPB (0 mg Intravenous Stopped 8/15/24 1738)   sodium chloride 0.9 % bolus 1,000 mL (1,000 mL Intravenous New Bag 8/15/24 1556)   HYDROmorphone (DILAUDID) injection 1 mg (1 mg Intravenous Given 8/15/24 1623)   sodium chloride 0.9 % bolus 500 mL (0 mL Intravenous Stopped 8/15/24 1703)       Diagnostic Studies  Results Reviewed       Procedure Component Value Units Date/Time    Sedimentation rate, automated [[id number]]  (Abnormal) Collected: 08/15/24 1401    Lab Status: Final result Specimen: Blood from Arm, Right Updated: 08/15/24 1738     Sed Rate 32 mm/hour     HS Troponin I  2hr [[id number]]  (Normal) Collected: 08/15/24 1653    Lab Status: Final result Specimen: Blood from Arm, Right Updated: 08/15/24 1721     hs TnI 2hr 9 ng/L      Delta 2hr hsTnI 1 ng/L     RBC Morphology Reflex Test [[id number]] Collected: 08/15/24 1401    Lab Status: Final result Specimen: Blood from Arm, Right Updated: 08/15/24 1701    CBC and differential [[id number]]  (Abnormal) Collected: 08/15/24 1401    Lab Status: Final result Specimen: Blood from Arm, Right Updated: 08/15/24 1608     WBC 12.95 Thousand/uL      RBC 4.50 Million/uL      Hemoglobin 11.2 g/dL      Hematocrit 35.5 %      MCV 79 fL      MCH 24.9 pg      MCHC 31.5 g/dL      RDW 18.3 %      MPV 10.5 fL      Platelets 458 Thousands/uL     Narrative:      This is an appended report.  These results have been appended to a previously verified report.    Manual Differential(PHLEBS Do Not Order) [[id number]]  (Abnormal) Collected: 08/15/24 1401    Lab Status: Final result Specimen: Blood from Arm, Right Updated: 08/15/24 1608     Segmented % 97 %      Lymphocytes % 3 %      Monocytes % 0 %      Eosinophils % 0 %      Basophils % 0 %      Absolute Neutrophils 12.56 Thousand/uL      Absolute Lymphocytes 0.39 Thousand/uL      Absolute Monocytes 0.00 Thousand/uL      Absolute Eosinophils 0.00 Thousand/uL      Absolute Basophils 0.00 Thousand/uL      Total Counted --     RBC Morphology Present     Platelet Estimate Adequate     Anisocytosis Present    Procalcitonin [[id number]]  (Normal) Collected: 08/15/24 1438    Lab Status: Final result Specimen: Blood from Arm, Left Updated: 08/15/24 1542     Procalcitonin 0.07 ng/ml     Lactic acid [[id number]]  (Abnormal) Collected: 08/15/24 1438    Lab Status: Final result Specimen: Blood from Arm, Left Updated: 08/15/24 1541     LACTIC ACID 4.7 mmol/L     Narrative:      Result may be elevated if tourniquet was used during collection.    FLU/RSV/COVID - if FLU/RSV clinically relevant [[id number]]  (Normal) Collected:  08/15/24 1437    Lab Status: Final result Specimen: Nares from Nose Updated: 08/15/24 1525     SARS-CoV-2 Negative     INFLUENZA A PCR Negative     INFLUENZA B PCR Negative     RSV PCR Negative    Narrative:      This test has been performed using the CoV-2/Flu/RSV plus assay on the [employer] platform. This test has been validated by the  and verified by the performing laboratory.     This test is designed to amplify and detect the following: nucleocapsid (N), envelope (E), and RNA-dependent RNA polymerase (RdRP) genes of the SARS-CoV-2 genome; matrix (M), basic polymerase (PB2), and acidic protein (PA) segments of the influenza A genome; matrix (M) and non-structural protein (NS) segments of the influenza B genome, and the nucleocapsid genes of RSV A and RSV B.     Positive results are indicative of the presence of Flu A, Flu B, RSV, and/or SARS-CoV-2 RNA. Positive results for SARS-CoV-2 or suspected novel influenza should be reported to state, local, or federal health departments according to local reporting requirements.      All results should be assessed in conjunction with clinical presentation and other laboratory markers for clinical management.     FOR PEDIATRIC PATIENTS - copy/paste COVID Guidelines URL to browser: [email or web address]       HS Troponin 0hr (reflex protocol) [[id number]]  (Normal) Collected: 08/15/24 1438    Lab Status: Final result Specimen: Blood from Arm, Left Updated: 08/15/24 1513     hs TnI 0hr 8 ng/L     Protime-INR [[id number]]  (Normal) Collected: 08/15/24 1438    Lab Status: Final result Specimen: Blood from Arm, Left Updated: 08/15/24 1501     Protime 13.6 seconds      INR 0.99    Narrative:      INR Therapeutic Range    Indication                                             INR Range      Atrial Fibrillation                                               2.0-3.0  Hypercoagulable State                                     2.0.2.3  Left Ventricular Asist Device                            2.0-3.0  Mechanical Heart Valve                                  -    Aortic(with afib, MI, embolism, HF, LA enlargement,    and/or coagulopathy)                                     2.0-3.0 (2.5-3.5)     Mitral                                                             2.5-3.5  Prosthetic/Bioprosthetic Heart Valve               2.0-3.0  Venous thromboembolism (VTE: VT, PE        2.0-3.0    APTT [[id number]]  (Normal) Collected: 08/15/24 1438    Lab Status: Final result Specimen: Blood from Arm, Left Updated: 08/15/24 1501     PTT 33 seconds     Urine Microscopic [[id number]]  (Normal) Collected: 08/15/24 1436    Lab Status: Final result Specimen: Urine, Clean Catch Updated: 08/15/24 1500     RBC, UA 2-4 /hpf      WBC, UA 2-4 /hpf      Epithelial Cells None Seen /hpf      Bacteria, UA Occasional /hpf      MUCUS THREADS None Seen    UA w Reflex to Microscopic w Reflex to Culture [[id number]]  (Abnormal) Collected: 08/15/24 1436    Lab Status: Final result Specimen: Urine, Clean Catch Updated: 08/15/24 1459     Color, UA Light Yellow     Clarity, UA Clear     Specific Gravity, UA 1.010     pH, UA 6.0     Leukocytes, UA Small     Nitrite, UA Negative     Protein, UA Trace mg/dl      Glucose, UA 1000 (1%) mg/dl      Ketones, UA 10 (1+) mg/dl      Urobilinogen, UA <2.0 mg/dl      Bilirubin, UA Negative     Occult Blood, UA Trace    C-reactive protein [[id number]]  (Abnormal) Collected: 08/15/24 1401    Lab Status: Final result Specimen: Blood from Arm, Right Updated: 08/15/24 1456     CRP 11.5 mg/L     Blood culture #1 [[id number]] Collected: 08/15/24 1443    Lab Status: In process Specimen: Blood from Arm, Right Updated: 08/15/24 1452    Blood culture #2 [[id number]] Collected: 08/15/24 1438    Lab Status: In process Specimen: Blood from Arm, Left Updated: 08/15/24 1444    Comprehensive metabolic panel [[id number]]  (Abnormal) Collected: 08/15/24 1401     Lab Status: Final result Specimen: Blood from Arm, Right Updated: 08/15/24 1440     Sodium 139 mmol/L      Potassium 3.5 mmol/L      Chloride 98 mmol/L      CO2 24 mmol/L      ANION GAP 17 mmol/L      BUN 19 mg/dL      Creatinine 0.85 mg/dL      Glucose 188 mg/dL      Calcium 9.9 mg/dL      AST 13 U/L      ALT 7 U/L      Alkaline Phosphatase 82 U/L      Total Protein 7.9 g/dL      Albumin 4.6 g/dL      Total Bilirubin 0.49 mg/dL      eGFR 90 ml/min/1.73sq m     Narrative:      National Kidney Disease Foundation guidelines for Chronic Kidney Disease (CKD):     Stage 1 with normal or high GFR (GFR > 90 mL/min/1.73 square meters)    Stage 2 Mild CKD (GFR = 60-89 mL/min/1.73 square meters)    Stage 3A Moderate CKD (GFR = 45-59 mL/min/1.73 square meters)    Stage 3B Moderate CKD (GFR = 30-44 mL/min/1.73 square meters)    Stage 4 Severe CKD (GFR = 15-29 mL/min/1.73 square meters)    Stage 5 End Stage CKD (GFR <15 mL/min/1.73 square meters)  Note: GFR calculation is accurate only with a steady state creatinine    Lipase [[id number]]  (Abnormal) Collected: 08/15/24 1401    Lab Status: Final result Specimen: Blood from Arm, Right Updated: 08/15/24 1440     Lipase 9 u/L                    XR chest 1 view portable   ED Interpretation by Libertad Hansen PA-C (08/15 [id number])   No acute abnormalities.       CT spine lumbar w contrast   Final Result by Paresh Estrada MD (08/15 3354)      No significant change since prior examination from 6/27/2024. No new areas of discitis osteomyelitis identified.      Stable chronic discitis osteomyelitis at L5-S1 and lesser extent L1-L2. Endplate irregularity again noted at L2-L3 and L4-L5, stable.      No discrete paraspinal collections are seen. Limited evaluation for a collection within the spinal canal secondary to streak artifact from orthopedic hardware.      Workstation performed: [id number]                    Procedures  ECG 12 Lead Documentation Only    Date/Time: 8/15/2024 2:15  PM    Performed by: Libertad Hansen PA-C  Authorized by: Libertad Hansen PA-C    Indications / Diagnosis:  Tachycardia  ECG reviewed by me, the ED Provider: yes    Patient location:  ED  Previous ECG:     Previous ECG:  Compared to current    Similarity:  No change  Rate:     ECG rate:  80  Rhythm:     Rhythm: sinus rhythm    Ectopy:     Ectopy: PAC    ST segments:     ST segments:  Normal  T waves:     T waves: normal             ED Course  ED Course as of 08/15/24 1833   Thu Aug 15, 2024   1535 Sepsis alert called.    1611 Transfer order placed to talk to patient's neurosurgeon at [facility].    1631 Spoke with Dr. Capone at [facility], given patient's stable CT no need for transfer at this time.  He suggests continuing abx to treat sepsis.    1724 Patient given ice chips for po challenge.                               Initial Sepsis Screening       Row Name 08/15/24 1534                Is the patient's history suggestive of a new or worsening infection? Yes (Proceed)  -CD        Suspected source of infection implant or prosthesis infection  -CD        Indicate SIRS criteria Tachycardia > 90 bpm;Leukocytosis (WBC > 18276 IJL) OR Leukopenia (WBC <4000 IJL) OR Bandemia (WBC >10% bands)  -CD        Are two or more of the above signs & symptoms of infection both present and new to the patient? Yes (Proceed)  -CD        Assess for evidence of organ dysfunction: Are any of the below criteria present within 6 hours of suspected infection and SIRS criteria that are NOT considered to be chronic conditions? Lactate >/equal 4.0  -CD        Date of presentation of septic shock 08/15/24  -CD        Time of presentation of septic shock 1535  -CD        Fluid Resuscitation: 30 ml/kg IV fluid bolus will be given based on actual body weight  -CD        Is the patient is persistently hypotensive in the hour after fluid bolus administration? If yes, patient meets criteria for vasopressor use. NO  -CD        Sepsis  "Note: Click \"NEXT\" below (NOT \"close\") to generate sepsis note based on above information. --                  User Key  (r) = Recorded By, (t) = Taken By, (c) = Cosigned By      Initials Name Provider Type    JACKLYN Hansen PA-C Physician Assistant                  Default Flowsheet Data (Last 720 Hours)       Sepsis Reassess       Row Name 08/15/24 1659                   Repeat Volume Status and Tissue Perfusion Assessment Performed    Date of Reassessment: 08/15/24  -        Time of Reassessment: 1659  -        Sepsis Reassessment Note: Click \"NEXT\" below (NOT \"close\") to generate sepsis reassessment note. YES (proceed by clicking \"NEXT\")  -        Repeat Volume Status and Tissue Perfusion Assessment Performed --                  User Key  (r) = Recorded By, (t) = Taken By, (c) = Cosigned By      Initials Name Provider Type    JACKLYN Hansen PA-C Physician Assistant                  SBIRT 22yo+      Flowsheet Row Most Recent Value   Initial Alcohol Screen: US AUDIT-C     1. How often do you have a drink containing alcohol? 0 Filed at: 08/15/2024 1358   2. How many drinks containing alcohol do you have on a typical day you are drinking?  0 Filed at: 08/15/2024 1358   3a. Male UNDER 65: How often do you have five or more drinks on one occasion? 0 Filed at: 08/15/2024 1358   3b. FEMALE Any Age, or MALE 65+: How often do you have 4 or more drinks on one occassion? 0 Filed at: 08/15/2024 1358   Audit-C Score 0 Filed at: 08/15/2024 1358   JARON: How many times in the past year have you...    Used an illegal drug or used a prescription medication for non-medical reasons? Never Filed at: 08/15/2024 1358                      Medical Decision Making  Patient with N/V worsening back pain, h/o recurrent infections due to infected hardware, will order labs, CT scan to r/o new osteo/abscess.  Patient with chronic discitis, no new infection present, d/w neurosurgery from [place], will admit here for IV " abx, no need for transfer at this time.   CXR negative for pneumonia.  Patient with severe sepsis, concern for bacteremia, blood cultures pending.     Amount and/or Complexity of Data Reviewed  External Data Reviewed: labs and notes.  Labs: ordered.  Radiology: ordered and independent interpretation performed.  Discussion of management or test interpretation with external provider(s): D/w neurosurgery    Risk  Prescription drug management.  Decision regarding hospitalization.                 Disposition  Final diagnoses:   Nausea and vomiting   Severe sepsis (HCC)   Bacteremia     Time reflects when diagnosis was documented in both MDM as applicable and the Disposition within this note       Time User Action Codes Description Comment    8/15/2024  5:12 PM Libertad Hansen [R11.2] Nausea and vomiting     8/15/2024  5:12 PM Libertad Hansen Add [A41.9,  R65.20] Severe sepsis (HCC)     8/15/2024  5:12 PM Libertad Hansen [R78.81] Bacteremia           ED Disposition       ED Disposition   Admit    Condition   Stable    Date/Time   Thu Aug 15, 2024 1712    Comment   Case was discussed with Dr. Hunt and the patient's admission status was agreed to be Admission Status: inpatient status to the service of Dr. Hunt .               Follow-up Information    None         Current Discharge Medication List        CONTINUE these medications which have NOT CHANGED    Details   ACCU-CHEK FABIANO PLUS test strip 4 (four) times a day  Refills: 1      ACCU-CHEK FASTCLIX LANCETS MISC 4 (four) times a day  Refills: 1      acetaminophen (TYLENOL) 500 mg tablet Take 2 tablets (1,000 mg total) by mouth every 8 (eight) hours 500 mg tablet    Associated Diagnoses: S/P lumbar fusion      Azelastine HCl 137 MCG/SPRAY SOLN 1 spray into each nostril Every 12 hours      clonazePAM (KlonoPIN) 1 mg tablet Take 1 mg by mouth 2 (two) times a day & 3rd pill PRN      DAPTOMYCIN IV Inject 480 mg into a catheter in a vein daily       doxycycline hyclate (VIBRAMYCIN) 100 mg capsule Take 100 mg by mouth 2 (two) times a day Do not start before August 10, 2024.      escitalopram (Lexapro) 20 mg tablet Take 20 mg by mouth daily. Indications: .      finasteride (PROSCAR) 5 mg tablet TAKE 1 TABLET BY MOUTH DAILY  Qty: 100 tablet, Refills: 1    Comments: This prescription was filled on 5/7/2024. Any refills authorized will be placed on file.  Associated Diagnoses: Urinary retention      fluticasone (FLONASE) 50 mcg/act nasal spray 1 spray into each nostril daily as needed      folic acid (FOLVITE) 1 mg tablet Take 2,000 mcg by mouth daily  Refills: 6      furosemide (LASIX) 20 mg tablet Take 1 tablet (20 mg total) by mouth daily as needed (for weight gain more than 3 lbs overnight or more than 5 lbs in a week, or lower extremity swelling)  Qty: 30 tablet, Refills: 0    Associated Diagnoses: Lower extremity edema      gabapentin (NEURONTIN) 100 mg capsule Take 1 capsule (100 mg total) by mouth daily  Qty: 30 capsule, Refills: 0    Associated Diagnoses: Postlaminectomy syndrome, lumbar; Lumbar radiculopathy      GNP Aspirin Low Dose 81 MG EC tablet Take 1 tablet (81 mg total) by mouth daily Do not start before April 25, 2023.  Refills: 0      hydrOXYzine pamoate (VISTARIL) 50 mg capsule Take 50 mg by mouth 2 (two) times a day. Indications: Feeling Anxious      Jardiance 10 MG TABS Take 10 mg by mouth every morning      ketoconazole (NIZORAL) 2 % shampoo Apply 1 application. topically daily Apply to scalp  Refills: 6      Lactobacillus Acid-Pectin (Acidophilus/Pectin) CAPS Take 1 capsule by mouth daily. Dr Carmenza Szymanski  Indications: 52 days while on abx      lidocaine (LIDODERM) 5 % Apply 1 patch topically over 12 hours daily Remove & Discard patch within 12 hours or as directed by MD  Qty: 15 patch, Refills: 0    Associated Diagnoses: S/P lumbar fusion      loratadine (CLARITIN) 10 mg tablet Take 10 mg by mouth daily. per med list humberto SALAZAR   Indications: Hayfever      lovastatin (MEVACOR) 20 mg tablet Take 20 mg by mouth every morning  Refills: 3      metFORMIN (GLUCOPHAGE) 1000 MG tablet Take 1,000 mg by mouth 2 (two) times a day with meals       methocarbamol (ROBAXIN) 750 mg tablet Take 1 tablet (750 mg total) by mouth every 6 (six) hours as needed for muscle spasms    Associated Diagnoses: S/P lumbar fusion      metoprolol succinate (TOPROL-XL) 50 mg 24 hr tablet Take 50 mg by mouth 2 (two) times a day      mirtazapine (REMERON) 45 MG tablet Take 45 mg by mouth daily at bedtime  Refills: 1      NIFEdipine (PROCARDIA XL) 30 mg 24 hr tablet TAKE 1 TABLET BY MOUTH EVERY DAY IN ADDITION TO 90MG FOR A TOTAL OF 120MG EVERY DAY      NIFEdipine (PROCARDIA XL) 90 mg 24 hr tablet Take 120 mg by mouth every morning Takes 90 mg and 30 mg =120 mg every AM  Refills: 3      ondansetron (Zofran ODT) 4 mg disintegrating tablet Take 1 tablet (4 mg total) by mouth every 6 (six) hours as needed for nausea or vomiting  Qty: 30 tablet, Refills: 0    Associated Diagnoses: Viral gastroenteritis      oxyCODONE (ROXICODONE) 5 immediate release tablet Take 5 mg by mouth every 6 (six) hours as needed for severe pain. Dr. Katie MELCHOR  Indications: .      patient supplied medication medical marijuana vape as needed per latest dci  Indications: .      senna (SENOKOT) 8.6 mg Take 1 tablet (8.6 mg total) by mouth 2 (two) times a day 2 tabs at bedtime as needed for constipation per latest dci    Associated Diagnoses: Chronic bilateral low back pain with bilateral sciatica      sertraline (ZOLOFT) 100 mg tablet Take 100 mg by mouth every morning      Sodium Chloride Flush (Normal Saline Flush) 0.9 % SOLN Inject 10-20 mL into a catheter in a vein every 8 (eight) hours as needed (Before after antibiotic and as needed for patency). Indications: .      tamsulosin (FLOMAX) 0.4 mg TAKE 1 CAPSULE BY MOUTH DAILY WITH DINNER  Qty: 90 capsule, Refills: 1    Associated Diagnoses:  S/P lumbar fusion      VANCOMYCIN HCL IV Inject 1,000 mg into a catheter in a vein every 12 (twelve) hours. Dr. BETTY Sales Via Eclipse 100 ml NSS over 1 hour done by [facility] [phone]  Indications: .             No discharge procedures on file.    PDMP Review         Value Time User    PDMP Reviewed  Yes 7/15/2024 10:16 AM Leah He MD            ED Provider  Electronically Signed by             Libertad Hansen PA-C  08/15/24 0500

## 2024-08-15 NOTE — ASSESSMENT & PLAN NOTE
Patient is status post lumbar spinal fusion at [facility]  He is on oral doxycycline suppressive therapy  History of infected hardware in back and follows with neurosurgery  Case was discussed with his primary neurosurgeon, zhanna to stay at [facility] for antibiotics, blood cultures and pain control.

## 2024-08-15 NOTE — ASSESSMENT & PLAN NOTE
Acute on chronic back pain status post lumbar fusion  Follows with [facility]  Pain control with lidocaine, Tylenol, oxycodone, Dilaudid as needed

## 2024-08-15 NOTE — ASSESSMENT & PLAN NOTE
Acute on chronic back pain status post lumbar fusion  Follows with [facility]  Pain control with lidocaine, Tylenol, oxycodone, Dilaudid as needed

## 2024-08-15 NOTE — ASSESSMENT & PLAN NOTE
Meeting sepsis criteria with tachycardia, tachypnea, leukocytosis  Possibly secondary to hardware infection and back versus bacteremia  Lactic acid is 4.7  Repeat lactic acid now  Start IV hydration  Start IV cefepime and vancomycin  Follow-up blood cultures  ID consult

## 2024-08-15 NOTE — H&P
"[facility]  H&P  Name: Juan San 69 y.o. male I MRN: [id number]  Unit/Bed#: -01 I Date of Admission: 8/15/2024   Date of Service: 8/15/2024 I Hospital Day: 0      Assessment & Plan   * Sepsis with acute organ dysfunction (HCC)  Assessment & Plan  Meeting sepsis criteria with tachycardia, tachypnea, leukocytosis  Possibly secondary to hardware infection and back versus bacteremia  Lactic acid is 4.7  Repeat lactic acid now  Start IV hydration  Start IV cefepime and vancomycin  Follow-up blood cultures  ID consult    Hypertensive urgency  Assessment & Plan  Severely elevated blood pressures into the 220s  Multifactorial as he did not take his home antihypertensive this morning and he is in severe back pain  Treat pain with analgesic regimen  Resume home regimen of Procardia 120 mg daily, Toprol-XL 50 mg twice daily  Give short acting Procardia and labetalol tonight  Monitor blood pressure trend closely    Type 2 diabetes mellitus with hyperglycemia, without long-term current use of insulin ([facility])  Assessment & Plan  Lab Results   Component Value Date    HGBA1C 7.9 (H) 05/19/2024       No results for input(s): \"POCGLU\" in the last 72 hours.    Blood Sugar Average: Last 72 hrs:  A1c 7.9  Hold metformin and start sliding scale while hospitalized      Anxiety and depression  Assessment & Plan  Resume home Lexapro, Atarax, Remeron    Acute on chronic bilateral low back pain with sciatica  Assessment & Plan  Acute on chronic back pain status post lumbar fusion  Follows with [facility]  Pain control with lidocaine, Tylenol, oxycodone, Dilaudid as needed    Status post lumbar spinal fusion  Assessment & Plan  Patient is status post lumbar spinal fusion at [facility]  He is on oral doxycycline suppressive therapy  History of infected hardware in back and follows with neurosurgery  Case was discussed with his primary neurosurgeon, okay to stay at [facility]" [facility] for antibiotics, blood cultures and pain control.         VTE Pharmacologic Prophylaxis: VTE Score: 2 Low Risk (Score 0-2) - Encourage Ambulation.  Code Status: Level 1 - Full Code     Anticipated Length of Stay: Patient will be admitted on an inpatient basis with an anticipated length of stay of greater than 2 midnights secondary to severe sepsis due to hardware infection and back, possible bacteremia.    Total Time for Visit, including Counseling / Coordination of Care: 75 Minutes. Greater than 50% of this total time spent on direct patient counseling and coordination of care.    Chief Complaint: Chills, acute on chronic back pain    History of Present Illness:  Juan San is a 69 y.o. male with a PMH of hypertension, type 2 diabetes, chronic back pain status post lumbar fusion on oral doxycycline therapy for hardware infection who presents with acute on chronic back pain, chills and nausea.  Symptoms starting over the last 24 hours.  Came into the ER due to severe nausea, did not take his home antihypertensive regimen prior to coming into the ER.  Case was discussed with his neurosurgeon, zhanna to stay at [facility].  He recommends antibiotics at this time as well as pain control.    Review of Systems:  Review of Systems   Constitutional:  Positive for chills. Negative for activity change, appetite change, fatigue and fever.   HENT:  Negative for congestion, rhinorrhea, sinus pressure and sore throat.    Eyes:  Negative for photophobia, pain and visual disturbance.   Respiratory:  Negative for cough, shortness of breath and wheezing.    Cardiovascular:  Negative for chest pain, palpitations and leg swelling.   Gastrointestinal:  Positive for nausea. Negative for abdominal distention, abdominal pain, constipation, diarrhea and vomiting.   Endocrine: Negative for cold intolerance, heat intolerance, polydipsia and polyuria.   Genitourinary:  Negative for difficulty urinating, dysuria, flank pain,  frequency and hematuria.   Musculoskeletal:  Positive for back pain. Negative for arthralgias and joint swelling.   Skin:  Negative for color change, pallor and rash.   Allergic/Immunologic: Negative.    Neurological:  Negative for dizziness, syncope, weakness, light-headedness and headaches.   Hematological: Negative.    Psychiatric/Behavioral: Negative.         Past Medical and Surgical History:   Past Medical History:   Diagnosis Date    Anxiety     Arthritis     Cancer (HCC)     Chronic back pain     Depression     Diabetes mellitus (HCC)     Hypertension     Liver disease     Mitral valve prolapse     Peripheral neuropathy     Neuropathy    PONV (postoperative nausea and vomiting)     Thyroid disease        Past Surgical History:   Procedure Laterality Date    COLONOSCOPY      INCISION AND DRAINAGE OF WOUND Left 08/12/2022    Procedure: INCISION AND DRAINAGE (I&D) EXTREMITY;  Surgeon: Moustapha Cote DPM;  Location:  MAIN OR;  Service: Podiatry    IR BIOPSY SPINE  1/30/2024    IR BIOPSY SPINE  2/1/2024    IR PICC PLACEMENT SINGLE LUMEN  2/6/2024    JOINT REPLACEMENT Left 03/11/2022    Left TSA    MA ARTHRODESIS POSTERIOR/PSTLAT TQ 1NTRSPC LUMBAR Bilateral 04/11/2023    Procedure: L1-S1 navigated posterior decompression with instrumented fixation fusion;  Surgeon: James Moraes MD;  Location:  MAIN OR;  Service: Neurosurgery    THYROID SURGERY  2021    remove cancer       Meds/Allergies:  Prior to Admission medications    Medication Sig Start Date End Date Taking? Authorizing Provider   NANDAU-ELLIE FABIANO PLUS test strip 4 (four) times a day 12/17/18   Historical ProviderMD ANDERSON FASTCLIX LANCETS MISC 4 (four) times a day 12/22/18   Historical Provider, MD   acetaminophen (TYLENOL) 500 mg tablet Take 2 tablets (1,000 mg total) by mouth every 8 (eight) hours 500 mg tablet 5/21/24   Janee Zuleta PA-C   Azelastine HCl 137 MCG/SPRAY SOLN 1 spray into each nostril Every 12 hours    Historical Provider, MD    clonazePAM (KlonoPIN) 1 mg tablet Take 1 mg by mouth 2 (two) times a day & 3rd pill PRN 10/24/22   Historical Provider, MD   DAPTOMYCIN IV Inject 480 mg into a catheter in a vein daily    Historical Provider, MD   doxycycline hyclate (VIBRAMYCIN) 100 mg capsule Take 100 mg by mouth 2 (two) times a day Do not start before August 10, 2024. 8/10/24   Historical Provider, MD   escitalopram (Lexapro) 20 mg tablet Take 20 mg by mouth daily. Indications: . 5/29/24   Sabra Magaña DO   finasteride (PROSCAR) 5 mg tablet TAKE 1 TABLET BY MOUTH DAILY 7/26/24   Benedicto Rice MD   fluticasone (FLONASE) 50 mcg/act nasal spray 1 spray into each nostril daily as needed    Historical Provider, MD   folic acid (FOLVITE) 1 mg tablet Take 2,000 mcg by mouth daily 12/17/18   Historical Provider, MD   furosemide (LASIX) 20 mg tablet Take 1 tablet (20 mg total) by mouth daily as needed (for weight gain more than 3 lbs overnight or more than 5 lbs in a week, or lower extremity swelling) 7/15/24 8/14/24  Leah He MD   gabapentin (NEURONTIN) 100 mg capsule Take 1 capsule (100 mg total) by mouth daily 7/15/24 8/14/24  Leah He MD   GNP Aspirin Low Dose 81 MG EC tablet Take 1 tablet (81 mg total) by mouth daily Do not start before April 25, 2023. 4/25/23   Katja Oshea PA-C   hydrOXYzine pamoate (VISTARIL) 50 mg capsule Take 50 mg by mouth 2 (two) times a day. Indications: Feeling Anxious    Historical Provider, MD   Jardiance 10 MG TABS Take 10 mg by mouth every morning 7/20/22   Historical Provider, MD   ketoconazole (NIZORAL) 2 % shampoo Apply 1 application. topically daily Apply to scalp 10/10/18   Historical Provider, MD   Lactobacillus Acid-Pectin (Acidophilus/Pectin) CAPS Take 1 capsule by mouth daily. Dr Carmezna Szymanski  Indications: 52 days while on abx 7/1/24 8/22/24  Historical Provider, MD   lidocaine (LIDODERM) 5 % Apply 1 patch topically over 12 hours daily Remove & Discard patch within 12  hours or as directed by MD 2/27/24   Harinder Marin MD   loratadine (CLARITIN) 10 mg tablet Take 10 mg by mouth daily. [facility]  Indications: Hayfever    Historical Provider, MD   lovastatin (MEVACOR) 20 mg tablet Take 20 mg by mouth every morning 12/17/18   Historical Provider, MD   metFORMIN (GLUCOPHAGE) 1000 MG tablet Take 1,000 mg by mouth 2 (two) times a day with meals  1/2/19   Historical Provider, MD   methocarbamol (ROBAXIN) 750 mg tablet Take 1 tablet (750 mg total) by mouth every 6 (six) hours as needed for muscle spasms 5/21/24   Janee Zuleta PA-C   metoprolol succinate (TOPROL-XL) 50 mg 24 hr tablet Take 50 mg by mouth 2 (two) times a day 4/3/24   Historical Provider, MD   mirtazapine (REMERON) 45 MG tablet Take 45 mg by mouth daily at bedtime 12/11/18   Historical Provider, MD   NIFEdipine (PROCARDIA XL) 30 mg 24 hr tablet TAKE 1 TABLET BY MOUTH EVERY DAY IN ADDITION TO 90MG FOR A TOTAL OF 120MG EVERY DAY 2/20/24   Historical Provider, MD   NIFEdipine (PROCARDIA XL) 90 mg 24 hr tablet Take 120 mg by mouth every morning Takes 90 mg and 30 mg =120 mg every AM 12/17/18   Historical Provider, MD   ondansetron (Zofran ODT) 4 mg disintegrating tablet Take 1 tablet (4 mg total) by mouth every 6 (six) hours as needed for nausea or vomiting 5/21/24   Janee Zuleta PA-C   oxyCODONE (ROXICODONE) 5 immediate release tablet Take 5 mg by mouth every 6 (six) hours as needed for severe pain. Dr. Katie MELCHOR  Indications: . 7/16/24   Historical Provider, MD   patient supplied medication medical marijuana vape as needed per latest dci  Indications: .    Historical Provider, MD   senna (SENOKOT) 8.6 mg Take 1 tablet (8.6 mg total) by mouth 2 (two) times a day 2 tabs at bedtime as needed for constipation per latest dci 5/21/24   Janee Zuleta PA-C   sertraline (ZOLOFT) 100 mg tablet Take 100 mg by mouth every morning    Historical Provider, MD   Sodium Chloride Flush (Normal Saline  Flush) 0.9 % SOLN Inject 10-20 mL into a catheter in a vein every 8 (eight) hours as needed (Before after antibiotic and as needed for patency). Indications: .    Historical Provider, MD   tamsulosin (FLOMAX) 0.4 mg TAKE 1 CAPSULE BY MOUTH DAILY WITH DINNER 8/13/24   Benedicto Rice MD   VANCOMYCIN HCL IV Inject 1,000 mg into a catheter in a vein every 12 (twelve) hours. Dr. BETTY Sales Via Eclipse 100 ml NSS over 1 hour done by [facility] [phone]  Indications: .  Patient not taking: Reported on 7/24/2024 7/2/24   Historical Provider, MD   ARIPiprazole (ABILIFY) 30 mg tablet Take 30 mg by mouth daily at bedtime  Patient not taking: Reported on 7/29/2022 12/17/18 8/9/22  Historical Provider, MD   DIGOX 250 MCG tablet TAKE 1 TABLET BY MOUTH EVERY DAY BUT DO NOT TAKE ON SUNDAY OR WEDNESDAY  Patient not taking: Reported on 7/29/2022 12/17/18 8/9/22  Historical Provider, MD   glipiZIDE (GLUCOTROL XL) 10 mg 24 hr tablet Take 10 mg by mouth 2 (two) times a day. Indications: Type 2 Diabetes 9/8/22 9/8/22  Sabra Magaña DO   HUMULIN N 100 UNIT/ML subcutaneous injection INJECT 40 UNITS IN THE MORNING AND 6 UNITS in THE IN THE EVENING AS DIRECTED  Patient not taking: Reported on 8/9/2022 12/27/18 8/9/22  Historical Provider, MD   ondansetron (ZOFRAN) 4 mg tablet Take 1 tablet (4 mg total) by mouth every 6 (six) hours as needed for nausea or vomiting  Patient not taking: Reported on 7/29/2022 4/29/22 8/9/22  Stefany Urbina DO   propranolol (INDERAL LA) 160 mg Take 160 mg by mouth daily 12/17/18 9/16/22  Historical Provider, MD       All medications reviewed.    Allergies:   Allergies   Allergen Reactions    Abilify [Aripiprazole] Tremor     Shaking      Cephalexin Diarrhea    Molds & Smuts Allergic Rhinitis    Pregabalin Tremor     Lyrica - shaking feeling       Social History:  Marital Status: /Civil Union     Substance Use History:   Social History     Substance and Sexual Activity   Alcohol Use  "Never     Social History     Tobacco Use   Smoking Status Former    Current packs/day: 0.00    Average packs/day: 0.3 packs/day for 5.9 years (1.5 ttl pk-yrs)    Types: Cigarettes    Start date: 1975    Quit date: 1981    Years since quittin.2   Smokeless Tobacco Never   Tobacco Comments    quit ; smokes when in pain as of 26     Social History     Substance and Sexual Activity   Drug Use Yes    Frequency: 7.0 times per week    Types: Marijuana    Comment: Medical Marijuana, takes for pain, daily, vape       Family History:  Non-contributory    Physical Exam:     Vitals:   Blood Pressure: (!) 204/104 (08/15/24 181)  Pulse: 99 (08/15/24 1816)  Temperature: 97.8 °F (36.6 °C) (08/15/24 1816)  Temp Source: Oral (08/15/24 1816)  Respirations: 16 (08/15/24 1816)  Height: 5' 8\" (172.7 cm) (08/15/24 1359)  Weight - Scale: 80.3 kg (177 lb) (08/15/24 1359)  SpO2: 100 % (08/15/24 181)    Physical Exam  Vitals and nursing note reviewed.   Constitutional:       General: He is not in acute distress.     Appearance: Normal appearance.   HENT:      Head: Normocephalic and atraumatic.      Mouth/Throat:      Mouth: Mucous membranes are moist.   Eyes:      General: No scleral icterus.     Extraocular Movements: Extraocular movements intact.      Pupils: Pupils are equal, round, and reactive to light.   Cardiovascular:      Rate and Rhythm: Regular rhythm. Tachycardia present.      Heart sounds: Normal heart sounds. No murmur heard.     No friction rub.   Pulmonary:      Effort: Pulmonary effort is normal.      Breath sounds: Normal breath sounds. No wheezing or rhonchi.   Abdominal:      General: Bowel sounds are normal. There is no distension.      Palpations: Abdomen is soft.      Tenderness: There is no abdominal tenderness. There is no guarding.   Musculoskeletal:         General: No swelling.      Cervical back: Neck supple.      Right lower leg: No edema.      Left lower leg: No edema.   Skin:     " General: Skin is warm and dry.      Capillary Refill: Capillary refill takes less than 2 seconds.      Coloration: Skin is not jaundiced or pale.   Neurological:      General: No focal deficit present.      Mental Status: He is alert and oriented to person, place, and time. Mental status is at baseline.          Additional Data:     Lab Results:  Results from last 7 days   Lab Units 08/15/24  1401   WBC Thousand/uL 12.95*   HEMOGLOBIN g/dL 11.2*   HEMATOCRIT % 35.5*   PLATELETS Thousands/uL 458*   LYMPHO PCT % 3*   MONO PCT % 0*   EOS PCT % 0     Results from last 7 days   Lab Units 08/15/24  1401   SODIUM mmol/L 139   POTASSIUM mmol/L 3.5   CHLORIDE mmol/L 98   CO2 mmol/L 24   BUN mg/dL 19   CREATININE mg/dL 0.85   ANION GAP mmol/L 17*   CALCIUM mg/dL 9.9   ALBUMIN g/dL 4.6   TOTAL BILIRUBIN mg/dL 0.49   ALK PHOS U/L 82   ALT U/L 7   AST U/L 13   GLUCOSE RANDOM mg/dL 188*     Results from last 7 days   Lab Units 08/15/24  1438   INR  0.99             Results from last 7 days   Lab Units 08/15/24  1438   LACTIC ACID mmol/L 4.7*   PROCALCITONIN ng/ml 0.07       Imaging: All pertinent imaging reviewed.  XR chest 1 view portable   ED Interpretation by Libertad Hansen PA-C (08/15 1657)   No acute abnormalities.       CT spine lumbar w contrast   Final Result by Paresh Estrada MD (08/15 1550)      No significant change since prior examination from 6/27/2024. No new areas of discitis osteomyelitis identified.      Stable chronic discitis osteomyelitis at L5-S1 and lesser extent L1-L2. Endplate irregularity again noted at L2-L3 and L4-L5, stable.      No discrete paraspinal collections are seen. Limited evaluation for a collection within the spinal canal secondary to streak artifact from orthopedic hardware.      Workstation performed: [id number]             EKG and Other Studies Reviewed on Admission:   Prior pertinent studies and records reviewed in [facility]/Care Everywhere.    ** Please Note: This note has been  constructed using a voice recognition system. **

## 2024-08-15 NOTE — SEPSIS NOTE
"  Sepsis Note   Juan aSn 69 y.o. male MRN: [id number]  Unit/Bed#: ED 09 Encounter: [id number]       Initial Sepsis Screening       Row Name 08/15/24 1534                Is the patient's history suggestive of a new or worsening infection? Yes (Proceed)  -CD        Suspected source of infection implant or prosthesis infection  -CD        Indicate SIRS criteria Tachycardia > 90 bpm;Leukocytosis (WBC > 94125 IJL) OR Leukopenia (WBC <4000 IJL) OR Bandemia (WBC >10% bands)  -CD        Are two or more of the above signs & symptoms of infection both present and new to the patient? Yes (Proceed)  -CD        Assess for evidence of organ dysfunction: Are any of the below criteria present within 6 hours of suspected infection and SIRS criteria that are NOT considered to be chronic conditions? Lactate >/equal 4.0  -CD        Date of presentation of septic shock 08/15/24  -CD        Time of presentation of septic shock 1535  -CD        Fluid Resuscitation: 30 ml/kg IV fluid bolus will be given based on actual body weight  -CD        Is the patient is persistently hypotensive in the hour after fluid bolus administration? If yes, patient meets criteria for vasopressor use. NO  -CD        Sepsis Note: Click \"NEXT\" below (NOT \"close\") to generate sepsis note based on above information. --                  User Key  (r) = Recorded By, (t) = Taken By, (c) = Cosigned By      Initials Name Provider Type    CD Libertad Hansen PA-C Physician Assistant                        Body mass index is 26.91 kg/m².  Wt Readings from Last 1 Encounters:   08/15/24 80.3 kg (177 lb)     IBW (Ideal Body Weight): 68.4 kg    Ideal body weight: 68.4 kg (150 lb 12.7 oz)  Adjusted ideal body weight: 73.2 kg (161 lb 4.4 oz)    "

## 2024-08-15 NOTE — ASSESSMENT & PLAN NOTE
Severely elevated blood pressures into the 220s  Multifactorial as he did not take his home antihypertensive this morning and he is in severe back pain  Treat pain with analgesic regimen  Resume home regimen of Procardia 120 mg daily, Toprol-XL 50 mg twice daily  Give short acting Procardia and labetalol tonight  Monitor blood pressure trend closely

## 2024-08-15 NOTE — ASSESSMENT & PLAN NOTE
"Lab Results   Component Value Date    HGBA1C 7.9 (H) 05/19/2024       No results for input(s): \"POCGLU\" in the last 72 hours.    Blood Sugar Average: Last 72 hrs:  A1c 7.9  Hold metformin and start sliding scale while hospitalized    "

## 2024-08-15 NOTE — ASSESSMENT & PLAN NOTE
Meeting sepsis criteria with tachycardia, tachypnea, leukocytosis  Possibly secondary to hardware infection and back versus bacteremia  Lactic acid is 4.7  Repeat lactic acid now  Cont  IV hydration  cont IV cefepime and vancomycin  Follow-up blood cultures  ID consult - MRI prostate pending

## 2024-08-15 NOTE — ASSESSMENT & PLAN NOTE
Patient is status post lumbar spinal fusion at [facility]  He is on oral doxycycline suppressive therapy  History of infected hardware in back and follows with neurosurgery  Case was discussed with his primary neurosurgeon, zhanna to stay at [facility] for antibiotics, blood cultures and pain control.

## 2024-08-15 NOTE — SEPSIS NOTE
"  Sepsis Note   Juan San 69 y.o. male MRN: [id number]  Unit/Bed#: ED 09 Encounter: [id number]       Initial Sepsis Screening       Row Name 08/15/24 1534                Is the patient's history suggestive of a new or worsening infection? Yes (Proceed)  -CD        Suspected source of infection implant or prosthesis infection  -CD        Indicate SIRS criteria Tachycardia > 90 bpm;Leukocytosis (WBC > 12268 IJL) OR Leukopenia (WBC <4000 IJL) OR Bandemia (WBC >10% bands)  -CD        Are two or more of the above signs & symptoms of infection both present and new to the patient? Yes (Proceed)  -CD        Assess for evidence of organ dysfunction: Are any of the below criteria present within 6 hours of suspected infection and SIRS criteria that are NOT considered to be chronic conditions? Lactate >/equal 4.0  -CD        Date of presentation of septic shock 08/15/24  -        Time of presentation of septic shock 1535  -CD        Fluid Resuscitation: 30 ml/kg IV fluid bolus will be given based on actual body weight  -CD        Is the patient is persistently hypotensive in the hour after fluid bolus administration? If yes, patient meets criteria for vasopressor use. NO  -CD        Sepsis Note: Click \"NEXT\" below (NOT \"close\") to generate sepsis note based on above information. --                  User Key  (r) = Recorded By, (t) = Taken By, (c) = Cosigned By      Initials Name Provider Type    CD Libertad Hansen PA-C Physician Assistant                    Default Flowsheet Data (Last 720 Hours)       Sepsis Reassess       Row Name 08/15/24 1659                   Repeat Volume Status and Tissue Perfusion Assessment Performed    Date of Reassessment: 08/15/24  -        Time of Reassessment: 1659  -CD        Sepsis Reassessment Note: Click \"NEXT\" below (NOT \"close\") to generate sepsis reassessment note. YES (proceed by clicking \"NEXT\")  -CD        Repeat Volume Status and Tissue Perfusion Assessment Performed -- "                  User Key  (r) = Recorded By, (t) = Taken By, (c) = Cosigned By      Initials Name Provider Type    CD Libertad Hansen PA-C Physician Assistant                    Body mass index is 26.91 kg/m².  Wt Readings from Last 1 Encounters:   08/15/24 80.3 kg (177 lb)     IBW (Ideal Body Weight): 68.4 kg    Ideal body weight: 68.4 kg (150 lb 12.7 oz)  Adjusted ideal body weight: 73.2 kg (161 lb 4.4 oz)

## 2024-08-16 LAB
ALBUMIN SERPL BCG-MCNC: 4 G/DL (ref 3.5–5)
ALP SERPL-CCNC: 68 U/L (ref 34–104)
ALT SERPL W P-5'-P-CCNC: 7 U/L (ref 7–52)
ANION GAP SERPL CALCULATED.3IONS-SCNC: 8 MMOL/L (ref 4–13)
AST SERPL W P-5'-P-CCNC: 13 U/L (ref 13–39)
ATRIAL RATE: 80 BPM
BASOPHILS # BLD AUTO: 0.04 THOUSANDS/ÂΜL (ref 0–0.1)
BASOPHILS NFR BLD AUTO: 0 % (ref 0–1)
BILIRUB SERPL-MCNC: 0.43 MG/DL (ref 0.2–1)
BUN SERPL-MCNC: 15 MG/DL (ref 5–25)
CALCIUM SERPL-MCNC: 8.8 MG/DL (ref 8.4–10.2)
CHLORIDE SERPL-SCNC: 105 MMOL/L (ref 96–108)
CO2 SERPL-SCNC: 28 MMOL/L (ref 21–32)
CREAT SERPL-MCNC: 0.7 MG/DL (ref 0.6–1.3)
EOSINOPHIL # BLD AUTO: 0.01 THOUSAND/ÂΜL (ref 0–0.61)
EOSINOPHIL NFR BLD AUTO: 0 % (ref 0–6)
ERYTHROCYTE [DISTWIDTH] IN BLOOD BY AUTOMATED COUNT: 18.4 % (ref 11.6–15.1)
GFR SERPL CREATININE-BSD FRML MDRD: 97 ML/MIN/1.73SQ M
GLUCOSE SERPL-MCNC: 114 MG/DL (ref 65–140)
GLUCOSE SERPL-MCNC: 131 MG/DL (ref 65–140)
GLUCOSE SERPL-MCNC: 151 MG/DL (ref 65–140)
GLUCOSE SERPL-MCNC: 152 MG/DL (ref 65–140)
GLUCOSE SERPL-MCNC: 173 MG/DL (ref 65–140)
HCT VFR BLD AUTO: 34.1 % (ref 36.5–49.3)
HGB BLD-MCNC: 10.7 G/DL (ref 12–17)
IMM GRANULOCYTES # BLD AUTO: 0.11 THOUSAND/UL (ref 0–0.2)
IMM GRANULOCYTES NFR BLD AUTO: 1 % (ref 0–2)
LYMPHOCYTES # BLD AUTO: 1.36 THOUSANDS/ÂΜL (ref 0.6–4.47)
LYMPHOCYTES NFR BLD AUTO: 13 % (ref 14–44)
MCH RBC QN AUTO: 25 PG (ref 26.8–34.3)
MCHC RBC AUTO-ENTMCNC: 31.4 G/DL (ref 31.4–37.4)
MCV RBC AUTO: 80 FL (ref 82–98)
MONOCYTES # BLD AUTO: 1.01 THOUSAND/ÂΜL (ref 0.17–1.22)
MONOCYTES NFR BLD AUTO: 10 % (ref 4–12)
NEUTROPHILS # BLD AUTO: 8.15 THOUSANDS/ÂΜL (ref 1.85–7.62)
NEUTS SEG NFR BLD AUTO: 76 % (ref 43–75)
NRBC BLD AUTO-RTO: 0 /100 WBCS
P AXIS: 69 DEGREES
PLATELET # BLD AUTO: 395 THOUSANDS/UL (ref 149–390)
PMV BLD AUTO: 9.9 FL (ref 8.9–12.7)
POTASSIUM SERPL-SCNC: 3.1 MMOL/L (ref 3.5–5.3)
PR INTERVAL: 154 MS
PROT SERPL-MCNC: 6.7 G/DL (ref 6.4–8.4)
QRS AXIS: 78 DEGREES
QRSD INTERVAL: 78 MS
QT INTERVAL: 396 MS
QTC INTERVAL: 456 MS
RBC # BLD AUTO: 4.28 MILLION/UL (ref 3.88–5.62)
SODIUM SERPL-SCNC: 141 MMOL/L (ref 135–147)
T WAVE AXIS: 71 DEGREES
VENTRICULAR RATE: 80 BPM
WBC # BLD AUTO: 10.68 THOUSAND/UL (ref 4.31–10.16)

## 2024-08-16 PROCEDURE — 85025 COMPLETE CBC W/AUTO DIFF WBC: CPT | Performed by: PHYSICIAN ASSISTANT

## 2024-08-16 PROCEDURE — 80053 COMPREHEN METABOLIC PANEL: CPT | Performed by: PHYSICIAN ASSISTANT

## 2024-08-16 PROCEDURE — 99232 SBSQ HOSP IP/OBS MODERATE 35: CPT | Performed by: HOSPITALIST

## 2024-08-16 PROCEDURE — 82948 REAGENT STRIP/BLOOD GLUCOSE: CPT

## 2024-08-16 PROCEDURE — 99222 1ST HOSP IP/OBS MODERATE 55: CPT | Performed by: PODIATRIST

## 2024-08-16 PROCEDURE — 93010 ELECTROCARDIOGRAM REPORT: CPT | Performed by: INTERNAL MEDICINE

## 2024-08-16 RX ORDER — POTASSIUM CHLORIDE 1500 MG/1
40 TABLET, EXTENDED RELEASE ORAL ONCE
Status: COMPLETED | OUTPATIENT
Start: 2024-08-16 | End: 2024-08-16

## 2024-08-16 RX ADMIN — POTASSIUM CHLORIDE 40 MEQ: 1500 TABLET, EXTENDED RELEASE ORAL at 15:00

## 2024-08-16 RX ADMIN — NIFEDIPINE 90 MG: 30 TABLET, EXTENDED RELEASE ORAL at 08:40

## 2024-08-16 RX ADMIN — VANCOMYCIN HYDROCHLORIDE 1000 MG: 1 INJECTION, SOLUTION INTRAVENOUS at 03:57

## 2024-08-16 RX ADMIN — PRAVASTATIN SODIUM 20 MG: 20 TABLET ORAL at 15:53

## 2024-08-16 RX ADMIN — CEFEPIME HYDROCHLORIDE 2000 MG: 2 INJECTION, SOLUTION INTRAVENOUS at 15:54

## 2024-08-16 RX ADMIN — HYDROMORPHONE HYDROCHLORIDE 0.5 MG: 1 INJECTION, SOLUTION INTRAMUSCULAR; INTRAVENOUS; SUBCUTANEOUS at 23:09

## 2024-08-16 RX ADMIN — ESCITALOPRAM OXALATE 20 MG: 20 TABLET ORAL at 08:25

## 2024-08-16 RX ADMIN — LACTOBACILLUS ACIDOPHILUS / LACTOBACILLUS BULGARICUS 1 PACKET: 100 MILLION CFU STRENGTH GRANULES at 15:53

## 2024-08-16 RX ADMIN — TAMSULOSIN HYDROCHLORIDE 0.4 MG: 0.4 CAPSULE ORAL at 15:53

## 2024-08-16 RX ADMIN — LACTOBACILLUS ACIDOPHILUS / LACTOBACILLUS BULGARICUS 1 PACKET: 100 MILLION CFU STRENGTH GRANULES at 13:22

## 2024-08-16 RX ADMIN — METOPROLOL SUCCINATE 50 MG: 50 TABLET, EXTENDED RELEASE ORAL at 20:55

## 2024-08-16 RX ADMIN — METHOCARBAMOL TABLETS 750 MG: 500 TABLET, COATED ORAL at 05:31

## 2024-08-16 RX ADMIN — METOPROLOL SUCCINATE 50 MG: 50 TABLET, EXTENDED RELEASE ORAL at 08:25

## 2024-08-16 RX ADMIN — INSULIN LISPRO 1 UNITS: 100 INJECTION, SOLUTION INTRAVENOUS; SUBCUTANEOUS at 16:12

## 2024-08-16 RX ADMIN — SODIUM CHLORIDE 125 ML/HR: 0.9 INJECTION, SOLUTION INTRAVENOUS at 03:56

## 2024-08-16 RX ADMIN — LACTOBACILLUS ACIDOPHILUS / LACTOBACILLUS BULGARICUS 1 PACKET: 100 MILLION CFU STRENGTH GRANULES at 08:26

## 2024-08-16 RX ADMIN — HYDROMORPHONE HYDROCHLORIDE 0.5 MG: 1 INJECTION, SOLUTION INTRAMUSCULAR; INTRAVENOUS; SUBCUTANEOUS at 18:18

## 2024-08-16 RX ADMIN — MIRTAZAPINE 45 MG: 15 TABLET, FILM COATED ORAL at 20:56

## 2024-08-16 RX ADMIN — CEFEPIME HYDROCHLORIDE 2000 MG: 2 INJECTION, SOLUTION INTRAVENOUS at 23:09

## 2024-08-16 RX ADMIN — OXYCODONE HYDROCHLORIDE 10 MG: 10 TABLET ORAL at 05:31

## 2024-08-16 RX ADMIN — NIFEDIPINE 30 MG: 30 TABLET, EXTENDED RELEASE ORAL at 08:40

## 2024-08-16 RX ADMIN — FINASTERIDE 5 MG: 5 TABLET, FILM COATED ORAL at 08:25

## 2024-08-16 RX ADMIN — SODIUM CHLORIDE 125 ML/HR: 0.9 INJECTION, SOLUTION INTRAVENOUS at 15:00

## 2024-08-16 RX ADMIN — METHOCARBAMOL TABLETS 750 MG: 500 TABLET, COATED ORAL at 15:06

## 2024-08-16 RX ADMIN — OXYCODONE HYDROCHLORIDE 10 MG: 10 TABLET ORAL at 15:06

## 2024-08-16 RX ADMIN — SENNOSIDES 8.6 MG: 8.6 TABLET, FILM COATED ORAL at 18:10

## 2024-08-16 RX ADMIN — OXYCODONE HYDROCHLORIDE 10 MG: 10 TABLET ORAL at 00:11

## 2024-08-16 RX ADMIN — SENNOSIDES 8.6 MG: 8.6 TABLET, FILM COATED ORAL at 08:27

## 2024-08-16 RX ADMIN — GABAPENTIN 100 MG: 100 CAPSULE ORAL at 08:26

## 2024-08-16 RX ADMIN — CEFEPIME HYDROCHLORIDE 2000 MG: 2 INJECTION, SOLUTION INTRAVENOUS at 08:25

## 2024-08-16 RX ADMIN — ASPIRIN 81 MG: 81 TABLET, COATED ORAL at 08:25

## 2024-08-16 RX ADMIN — INSULIN LISPRO 1 UNITS: 100 INJECTION, SOLUTION INTRAVENOUS; SUBCUTANEOUS at 13:22

## 2024-08-16 RX ADMIN — OXYCODONE HYDROCHLORIDE 10 MG: 10 TABLET ORAL at 20:56

## 2024-08-16 RX ADMIN — CLONAZEPAM 1 MG: 1 TABLET ORAL at 18:10

## 2024-08-16 RX ADMIN — VANCOMYCIN HYDROCHLORIDE 1000 MG: 1 INJECTION, SOLUTION INTRAVENOUS at 16:19

## 2024-08-16 RX ADMIN — HYDROXYZINE HYDROCHLORIDE 50 MG: 25 TABLET ORAL at 16:18

## 2024-08-16 RX ADMIN — CLONAZEPAM 1 MG: 1 TABLET ORAL at 08:25

## 2024-08-16 NOTE — PROGRESS NOTES
Juan San is a 69 y.o. male who is currently ordered Vancomycin IV with management by the Pharmacy Consult service.  Relevant clinical data and objective / subjective history reviewed.  Vancomycin Assessment:  Indication and Goal AUC/Trough: Bacteremia (goal -600, trough >10)  Clinical Status:  initial dosing  Micro:     Renal Function:  SCr: 0.85 mg/dL  CrCl: 81.6 mL/min  Renal replacement: Not on dialysis  Days of Therapy: 1  Current Dose: 1500 mg once   Vancomycin Plan:  New Dosin mg Q 12 H   Estimated AUC: 516 mcg*hr/mL  Estimated Trough: 14.6 mcg/mL  Next Level: Trough @ 0400 on 24 prior to 0430 dose with AM labs  Renal Function Monitoring: Daily BMP and UOP  Pharmacy will continue to follow closely for s/sx of nephrotoxicity, infusion reactions and appropriateness of therapy.  BMP and CBC will be ordered per protocol. We will continue to follow the patient’s culture results and clinical progress daily.    Hiren Fleming, Pharmacist

## 2024-08-16 NOTE — PROGRESS NOTES
"[facility]  Progress Note  Name: Juan San I  MRN: [id number]  Unit/Bed#: -01 I Date of Admission: 8/15/2024   Date of Service: 8/16/2024 I Hospital Day: 1    Assessment & Plan   Type 2 diabetes mellitus with hyperglycemia, without long-term current use of insulin (HCC)  Assessment & Plan  Lab Results   Component Value Date    HGBA1C 7.9 (H) 05/19/2024       No results for input(s): \"POCGLU\" in the last 72 hours.    Blood Sugar Average: Last 72 hrs:  A1c 7.9  Hold metformin and start sliding scale while hospitalized      Anxiety and depression  Assessment & Plan  Resume home Lexapro, Atarax, Remeron    Acute on chronic bilateral low back pain with sciatica  Assessment & Plan  Acute on chronic back pain status post lumbar fusion  Follows with [facility]  Pain control with lidocaine, Tylenol, oxycodone, Dilaudid as needed    Status post lumbar spinal fusion  Assessment & Plan  Patient is status post lumbar spinal fusion at [facility]  He is on oral doxycycline suppressive therapy  History of infected hardware in back and follows with neurosurgery  Case was discussed with his primary neurosurgeon, zhanna to stay at [facility] for antibiotics, blood cultures and pain control.    Hypertensive urgency  Assessment & Plan  Severely elevated blood pressures into the 220s  Multifactorial as he did not take his home antihypertensive this morning and he is in severe back pain  Treat pain with analgesic regimen  Resume home regimen of Procardia 120 mg daily, Toprol-XL 50 mg twice daily  Monitor blood pressure trend closely    * Sepsis with acute organ dysfunction (HCC)  Assessment & Plan  Meeting sepsis criteria with tachycardia, tachypnea, leukocytosis  Possibly secondary to hardware infection and back versus bacteremia  Lactic acid is 4.7  Repeat lactic acid now  Cont  IV hydration  cont IV cefepime and vancomycin  Follow-up blood cultures  ID " consult - MRI prostate pending            VTE  Prophylaxis:   Pharmacologic: in place  Mechanical VTE Prophylaxis in Place: Yes    Patient Centered Rounds: I have performed bedside rounds with nursing staff today.    Discussions with Specialists or Other Care Team Provider: case management    Education and Discussions with Family / Patient: yes    Mobility:   Basic Mobility Inpatient Raw Score: 21  JH-HLM Goal: 6: Walk 10 steps or more  JH-HLM Achieved: 7: Walk 25 feet or more      Current Length of Stay: 1 day(s)    Current Patient Status: Inpatient        Code Status: Level 1 - Full Code    Discharge Plan: Pt will require continued inpatient hospitalization.    Subjective:      Pt doing ok   Denies fever or chills    Patient is seen and examined at bedside.  All other ROS are negative.    Objective:     Vitals:   Temp (24hrs), Av.1 °F (36.7 °C), Min:97.7 °F (36.5 °C), Max:98.5 °F (36.9 °C)    Temp:  [97.7 °F (36.5 °C)-98.5 °F (36.9 °C)] 97.7 °F (36.5 °C)  HR:  [] 77  Resp:  [16-20] 18  BP: (145-236)/() 176/93  SpO2:  [98 %-100 %] 100 %  Body mass index is 25.17 kg/m².     Input and Output Summary (last 24 hours):       Intake/Output Summary (Last 24 hours) at 2024 1431  Last data filed at 2024 0501  Gross per 24 hour   Intake 2069 ml   Output 2075 ml   Net -6 ml       Physical Exam:       GEN: No acute distress, comfortable  HEEENT: No JVD, PERRLA, no scleral icterus  RESP: Lungs clear to auscultation bilaterally  CV: RRR, +s1/s2   ABD: SOFT NON TENDER, POSITIVE BOWEL SOUNDS, NO DISTENTION  PSYCH: CALM  NEURO: Mentation baseline, NO FOCAL DEFICITS  SKIN: NO RASH  EXTREM: NO EDEMA    Additional Data:     Labs:    Results from last 7 days   Lab Units 24  0507   WBC Thousand/uL 10.68*   HEMOGLOBIN g/dL 10.7*   HEMATOCRIT % 34.1*   PLATELETS Thousands/uL 395*   SEGS PCT % 76*   LYMPHO PCT % 13*   MONO PCT % 10   EOS PCT % 0     Results from last 7 days   Lab Units 24  0937    SODIUM mmol/L 141   POTASSIUM mmol/L 3.1*   CHLORIDE mmol/L 105   CO2 mmol/L 28   BUN mg/dL 15   CREATININE mg/dL 0.70   ANION GAP mmol/L 8   CALCIUM mg/dL 8.8   ALBUMIN g/dL 4.0   TOTAL BILIRUBIN mg/dL 0.43   ALK PHOS U/L 68   ALT U/L 7   AST U/L 13   GLUCOSE RANDOM mg/dL 151*     Results from last 7 days   Lab Units 08/15/24  1438   INR  0.99     Results from last 7 days   Lab Units 08/16/24  1150 08/16/24  0800 08/15/24  1942   POC GLUCOSE mg/dl 173* 131 170*         Results from last 7 days   Lab Units 08/15/24  1839 08/15/24  1438   LACTIC ACID mmol/L 2.8* 4.7*   PROCALCITONIN ng/ml  --  0.07       Lines/Drains:  Invasive Devices       Peripherally Inserted Central Catheter Line  Duration             PICC Line 06/03/24 Right Brachial 74 days              Peripheral Intravenous Line  Duration             Peripheral IV 08/15/24 Distal;Left;Upper;Ventral (anterior) Arm <1 day    Peripheral IV 08/15/24 Right Antecubital <1 day                    Telemetry:        * I Have Reviewed All Lab Data Listed Above.           Imaging:     Results for orders placed during the hospital encounter of 08/15/24    XR chest 1 view portable    Narrative  XR CHEST PORTABLE    INDICATION: vomiting.    COMPARISON: Chest radiograph July 13, 2024    FINDINGS:    Clear lungs. No pneumothorax or pleural effusion.    Normal cardiomediastinal silhouette.    Partially imaged left shoulder arthroplasty. Partially imaged thoracolumbar spinal hardware. No acute osseous abnormality.    Normal upper abdomen.    Impression  No acute cardiopulmonary disease.        Workstation performed: [id number]    Results for orders placed during the hospital encounter of 06/27/24    XR chest pa & lateral    Narrative  XR CHEST PA & LATERAL    INDICATION: reports chills.    COMPARISON: Chest radiograph 10/9/2023.    FINDINGS:    No consolidation or edema. Scattered right lung calcified granulomas. No pneumothorax or pleural effusion.    Normal  cardiomediastinal silhouette.    Partially visualized left shoulder arthroplasty. Partially visualized lumbar spine fusion.    Normal upper abdomen.    Impression  No acute cardiopulmonary disease.        Workstation performed: [id number]      *I have reviewed all imaging reports listed above      Recent Cultures (last 7 days):     Results from last 7 days   Lab Units 08/15/24  1443 08/15/24  1438   BLOOD CULTURE  Received in Microbiology Lab. Culture in Progress. Received in Microbiology Lab. Culture in Progress.       Last 24 Hours Medication List:   Current Facility-Administered Medications   Medication Dose Route Frequency Provider Last Rate    acetaminophen  650 mg Oral Q6H PRN Silvio Barrios PA-C      aspirin  81 mg Oral Daily Silvio Barrios PA-C      cefepime  2,000 mg Intravenous Q8H Julia Quiroz MD 2,000 mg (08/16/24 0825)    clonazePAM  1 mg Oral BID Silvio Barrios PA-C      escitalopram  20 mg Oral Daily Silvio Barrios PA-C      finasteride  5 mg Oral Daily Silvio Barrios PA-C      gabapentin  100 mg Oral Daily Silvio Barrios PA-C      HYDROmorphone  0.5 mg Intravenous Q2H PRN Silvio Barrios PA-C      hydrOXYzine HCL  50 mg Oral BID PRN Silvio Barrios PA-C      insulin lispro  1-6 Units Subcutaneous TID AC Silvio Barrios PA-C      insulin lispro  1-6 Units Subcutaneous HS Silvio Barrios PA-C      lactobacillus acidophilus-bulgaricus  1 packet Oral TID With Meals Silvio Barrios PA-C      lidocaine  1 patch Topical Daily Silvio Barrios PA-C      methocarbamol  750 mg Oral Q6H PRN Silvio Barrios PA-C      metoprolol succinate  50 mg Oral BID Silvio Barrios PA-C      mirtazapine  45 mg Oral HS Silvio Barrios PA-C      NIFEdipine  30 mg Oral Daily Silvio Barrios PA-C      NIFEdipine  90 mg Oral QAM Silvio Barrios PA-C      ondansetron  4 mg Intravenous Q6H PRN Silvio Barrios PA-C      oxyCODONE  5 mg Oral Q4H PRN Silvio Barrios PA-C      Or    oxyCODONE  10 mg Oral Q4H  PRN Silvio Barrios PA-C      polyethylene glycol  17 g Oral Daily PRN Silvio Barrios PA-C      potassium chloride  40 mEq Oral Once Harinder Marin MD      pravastatin  20 mg Oral Daily With Dinner Silvio Barrios PA-C      senna  8.6 mg Oral BID Silvio Barrios PA-C      sodium chloride  125 mL/hr Intravenous Continuous Silvio Barrios PA-C 125 mL/hr (08/16/24 0356)    tamsulosin  0.4 mg Oral Daily With Dinner Silvio Barrios PA-C      vancomycin  1,000 mg Intravenous Q12H Silvio Barrios PA-C 1,000 mg (08/16/24 0357)        Today, Patient Was Seen By: Harinder Marin MD    ** Please Note: Dictation voice to text software may have been used in the creation of this document. **

## 2024-08-16 NOTE — CASE MANAGEMENT
Case Management Assessment & Discharge Planning Note    Patient name Juan San  Location /-01 MRN [id number]  : 1955 Date 2024       Current Admission Date: 8/15/2024  Current Admission Diagnosis:Sepsis with acute organ dysfunction (HCC)   Patient Active Problem List    Diagnosis Date Noted Date Diagnosed    Prostatitis 2024     Lower extremity edema 2024     Venous stasis ulcers (HCC) 2024     LIGIA (acute kidney injury) (HCC) 2024     Failure of joint fusion (HCC) 2024     Sepsis with acute organ dysfunction (HCC) 2024     Lactic acidosis 2024     Type 2 diabetes mellitus with hyperglycemia, without long-term current use of insulin (HCC) 2024     Foraminal stenosis of lumbar region 2024     Discitis of lumbosacral region 2024     Hypochromic microcytic anemia 10/10/2023     Acute on chronic bilateral low back pain with sciatica 10/09/2023     Anxiety and depression 10/09/2023     Hypertension 10/09/2023     Benign prostatic hyperplasia with urinary retention 2023     Scrotal pain 2023     Lower urinary tract symptoms 2023     Status post lumbar spinal fusion 2023     Hyponatremia 2023     Gallstones 2023     Anemia 2023     Urinary retention 2023     PONV (postoperative nausea and vomiting)      Medical marijuana use      Chronic bilateral low back pain without sciatica 2023     Lumbar radiculopathy      Chronic pain syndrome 2022     Liver disease 08/10/2022     Hypertensive urgency 2022     Bronchitis, mucopurulent recurrent (HCC) 2022     Cirrhosis, alcoholic (HCC) 2022     Diabetic peripheral neuropathy (HCC) 2022     Herniated nucleus pulposus of lumbosacral region 2022     Thyroid cancer (HCC) 2021     Alcoholism in remission (HCC) 2021     Benzodiazepine dependence (HCC) 2021     Bilateral adhesive capsulitis  of shoulders 07/30/2021     DM (diabetes mellitus) (HCC) 07/30/2021     Hypercholesterolemia 07/30/2021     Lumbar spondylosis 07/30/2021       LOS (days): 1  Geometric Mean LOS (GMLOS) (days): 5.1  Days to GMLOS:4.4     OBJECTIVE:    Risk of Unplanned Readmission Score: 48.37         Current admission status: Inpatient       Preferred Pharmacy:   [facility] [address] [address]  Phone: [phone] Fax: [phone]    Primary Care Provider: Sabra Magaña DO    Primary Insurance: MEDICARE  Secondary Insurance: [facility]    ASSESSMENT:  Active Health Care Proxies       Tatyana San Health Care Representative - Spouse   Primary Phone: [phone] (Mobile)                 Advance Directives  Does patient have a Health Care POA?: No  Was patient offered paperwork?: Yes  Does patient currently have a Health Care decision maker?: Yes, please see Health Care Proxy section  Does patient have Advance Directives?: No  Was patient offered paperwork?: Yes  Primary Contact: Tatyana San         Readmission Root Cause  30 Day Readmission: No    Patient Information  Admitted from:: Home  Mental Status: Alert  During Assessment patient was accompanied by: Not accompanied during assessment  Assessment information provided by:: Patient  Primary Caregiver: Self  Support Systems: Spouse/significant other, Self, Home care staff, Other (Comment) ([facility] Home infusion)  County of Residence: [place]  What city do you live in?: [place]  Home entry access options. Select all that apply.: Stairs  Number of steps to enter home.: 1  Do the steps have railings?: Yes  Type of Current Residence: [facility]  Living Arrangements: Lives w/ Spouse/significant other  Is patient a ?: Yes  Is patient active with [facility] ( [facility])?: No    Activities of Daily Living Prior to Admission  Functional Status:  Independent  Completes ADLs independently?: Yes  Ambulates independently?: Yes (Pt stated there is pain when he walks.)  Does patient use assisted devices?: Yes  Assisted Devices (DME) used: Walker, Straight Cane, Shower Chair, Other (Comment) (Grab bars for the shower)  Does patient currently own DME?: Yes  What DME does the patient currently own?: Shower Chair, Straight Cane, Walker, Other (Comment) (Grab bars for the shower)  Does patient have a history of Outpatient Therapy (PT/OT)?: Yes  Does the patient have a history of Short-Term Rehab?: Yes ([facility])  Does patient have a history of HHC?: Yes ([facility])  Does patient currently have HHC?: Yes    Current Home Health Care  Type of Current Home Care Services: Nurse visit, Home health aide  Current Home Health Agency:: Other (please enter name in comment) ([facility])  Current Home Health Follow-Up Provider:: PCP    Patient Information Continued  Income Source: Unemployed  Does patient have prescription coverage?: Yes  Does patient receive dialysis treatments?: No  Does patient have a history of substance abuse?: Yes  Historical substance use preference: Alcohol/ETOH  Is patient currently in treatment for substance abuse?: N/A - sober  Does patient have a history of Mental Health Diagnosis?: Yes  Is patient receiving treatment for mental health?: Yes  Has patient received inpatient treatment related to mental health in the last 2 years?: No         Means of Transportation  Means of Transport to Appts:: Family transport      Social Determinants of Health (SDOH)      Flowsheet Row Most Recent Value   Housing Stability    In the last 12 months, was there a time when you were not able to pay the mortgage or rent on time? N   In the past 12 months, how many times have you moved where you were living? 1   At any time in the past 12 months, were you homeless or living in a shelter (including now)? N   Transportation Needs    In the past  12 months, has lack of transportation kept you from medical appointments or from getting medications? no   In the past 12 months, has lack of transportation kept you from meetings, work, or from getting things needed for daily living? No   Food Insecurity    Within the past 12 months, you worried that your food would run out before you got the money to buy more. Never true   Within the past 12 months, the food you bought just didn't last and you didn't have money to get more. Never true   Utilities    In the past 12 months has the electric, gas, oil, or water company threatened to shut off services in your home? No            DISCHARGE DETAILS:    Discharge planning discussed with:: Pt  Freedom of Choice: Yes     CM contacted family/caregiver?: No- see comments (Pt is in contact with family)  Were Treatment Team discharge recommendations reviewed with patient/caregiver?: Yes  Did patient/caregiver verbalize understanding of patient care needs?: Yes  Were patient/caregiver advised of the risks associated with not following Treatment Team discharge recommendations?: Yes         Requested Home Health Care         Is the patient interested in HHC at discharge?: Yes  Home Health Discipline requested:: Nursing  Home Health Agency Name:: Other  HHA External Referral Reason (only applicable if external HHA name selected): Patient has established relationship with provider ([facility])  Home Health Follow-Up Provider:: PCP  Home Health Services Needed:: Administration of IV, IM or SC Medications  Homebound Criteria Met:: Uses an Assist Device (i.e. cane, walker, etc), Requires the Assistance of Another Person for Safe Ambulation or to Leave the Home  Supporting Clincal Findings:: Limited Endurance    DME Referral Provided  Referral made for DME?: No    Other Referral/Resources/Interventions Provided:  Interventions: HHC  Referral Comments: CM made referral to [facility] for resumption of care  referral.         Treatment Team Recommendation: Home with Home Health Care  Discharge Destination Plan:: Home with Home Health Care                                         Additional Comments: Cm met with pt at bedside to discern discharge needs. Pt lives w/ spouse in a 1sh, 1 CECILIO, 1st fl setup. Recently moved 9 months ago. Pt reports being able to walk and perform ADLs independently PTA. Noted pain while walking, however. Pt uses and owns as needed a cane, a walker, a shower chair, and grab bars in the shower. Reports a hx of STR through [facility], a hx of [facility] trhough [facility] and [facility], and reports a hc of OPPT. Pt is current with [facility]. Pt also receives care home care through spouse 6 days a week, who is employed through [employer]. Spouse provides wound care. Pt reports no current SDOH concerns, has been sober from ETOH since 1981, and reports anxiety and depression with no inpt psych hx. Is not current with a mental health therapist or psychiatrist, receiving meds through PCP. Does not have medical POA. CM provided information on how to obtain one. Spouse would be transport home.

## 2024-08-16 NOTE — PROGRESS NOTES
Crisis Response  called to meet with patient in med surg who received news today that his brother . Patient was emotionally distraught at the time and nursing needed assistance.      met with the patient at bedside and engaged in conversation that started with story telling and life review. Patient was sad that he couldn't see his brother because he is hospitalized.  helped him process this but reinforced the importance of his own spiritual care to support his physical healing process. Patient agreed.     Patient's wife and grandchildren arrived and  included them in the prayers - for peace and understanding at the loss of his brother, but also to ask for intercession to help the patient heal and return home.     Patient was emotionally encouraged and smiling.     Spiritual care remains available as needed.        24 1800   Clinical Encounter Type   Visited With Patient;Patient and family together   Crisis Visit Critical Care   Referral From Nurse   Referral To    Cheondoism Encounters   Che[facility]ism Needs Prayer   Patient Spiritual Encounters   Spiritual Assessment 3   Suffering Severity 5   Fear Level 4   Feelings of Loneliness None evidenced   Feelings of Hopelessness Yes, as related to being able to see his brother who passed away.   Coping 3   Social Interaction 100% of the time   Grief Resolution 5

## 2024-08-16 NOTE — PLAN OF CARE

## 2024-08-16 NOTE — CONSULTS
Consultation - Infectious Disease   Juankailash San 69 y.o. male MRN: [id number]  Unit/Bed#: -01 Encounter: [id number]      Assessment & Plan   1.Nausea/Vomiting/Leukocytosis/Post-op Lumbar discitis/osteo with hardware infxn/Chronic abx suppression: Pt presented with c/o N/V x 1 day. Pt was taking Doxycycline for chronic abx suppression of his lumbar spine infxn with involvement of his hardware following lumbar laminectomies with T12-S1 fusion on 4/11/23.    1/24 to 2/6/24: Re-hospitalized at [facility] with increasing back pain. CT of lumbar spine showed lucency @S1 bilat transpedicular screws suggestive of loosening. MRI of lumbar spine showed post-decompressive laminectomy at L1-L5 with fusion spanning T12-S1. +Phlegmon but no abscess. IR did disc aspiration at L1-2 on 1/30/24 and at L5-S1 on 2/1. CX's were neg. Pt was d/c'd on 6 week course of Vanco and Cefepime which were completed on 3/8/24    6/11/24: Pt saw Dr. Call at [facility] for second opinion. Washout of lumbar spine was discussed.    6/12/24: Pt presented to the ER at [facility]"" with worsening back pain and was transferred to [facility]. ESR was 20 and CRP was 0.1    6/13 to 6/18/24: Pt was hospitalized at [facility] and underwent to the OR for washout of his lumbar spine on 6/14. OR cx's were neg. He was d/c'd on Vanco to complete 6 week course of IV abx and then would be placed on Doxycycline for chronic abx suppression until decision to remove hardware was made. Stop date for IV Vanco was 8/9/24 6/28 to 7/3/24: Pt was re-admitted at [facility] and was dx'd with prostatitis with two prostatic abscesses seen on MRI study. He was d/c'd on po Levaquin to be completed on 8/12.    7/22/24: Vanco was changed to Dapto because of rising creatinine    8/9/24: Dapto was completed    8/10/24: Pt started po Doxycycline for chronic abx suppression    8/12/24: Pt completed course of Levaquin for prostatitis    On admission, Pt did not have fever but WBC  count was mildly elevated at 12.9K. UA did not show any pyuria. I reviewed admission CXR which did not show any pulm infilts. CT of L-spine showed lucency surrounding S1 pedicle screws c/w loosening, There was discitis/osteo at L5-S1 and to a lesser extent at L1-2. Pt was started on Vanco and Cefepime. ESR is 32 and CRP is 11.5    Pt feels better today. No further N/V. WBC count has decreased to 10.6K. Bld cx's are neg so far.     I'm wondering if N/V are due to po Doxycycline which he started taking on 8/10 for chronic abx suppression for his lumbar discitis with involvement of hardware.     - Cont Vanco and Cefepime while bld cx's are pending  - Would do repeat prostate MRI to see if abscesses have resolved after prolonged course of po Levaquin which was completed on   - If bld cx's are neg, will stop Vanco and Cefepime and would restart po Doxycycline to see if N/V recurs  - Pt will f/u with Neurosurgery - Dr. Call and ID - Dr. Miles at [facility]  - Will monitor for the development of toxicity to current abx tx  - Will review Pt's EMR daily and re-eval the Pt on Monday but please contact the ID physician OC if any new probs occur over the next couple of days.       Discussed case with Dr. Harinder Marin        History of Present Illness   Physician Requesting Consult: Harinder Marin MD  Reason for Consult / Principal Problem: N/V and chronic back pain radiating to his leg    HPI: Juan San is a 69 y.o. year old male with chronic back pain due to DDD - s/p lumbar laminectomies with T12-S1 fusion c/b L5-S1 epidural infxn in setting of discitis/osteo with hardware, recent prostatitis with bilat prostatic abscesses, DM, HT, anxiety, and depression who was admitted on  8/15 with c/o N/V x 1 day. Pt was taking Doxycycline for chronic abx suppression of his lumbar spine infxn with involvement of his hardware.     Pt's history consists of the followin/11 to 23: Hospitalized at [facility]. Pt  underwent lumbar laminectomies and T12-S1 fusion on 4/11/23. He had elevated WBC count post-op. CT of lumbar spine was done on 4/19 mildly increased erosive changes at L1-2 endplates with gas and inflammation in posterior paraspinal soft tissues along surgical bed - no abscess. He was d/c'd on Bactrim x 7 days for possible UTI.     1/24 to 2/6/24: Re-hospitalized at [facility] with increasing back pain. CT of lumbar spine showed lucency @S1 bilat transpedicular screws suggestive of loosening. MRI of lumbar spine showed post-decompressive laminectomy at L1-L5 with fusion spanning T12-S1. +Phlegmon but no abscess. IR did disc aspiration at L1-2 on 1/30/24 and at L5-S1 on 2/1. CX's were neg. Pt was d/c'd on 6 week course of Vanco and Cefepime which were completed on 3/8/24    6/11/24: Pt saw Dr. Call at [facility] for second opinion. Washout of lumbar spine was discussed.    6/12/24: Pt presented to the ER at [facility] with worsening back pain and was transferred to [facility]. ESR was 20 and CRP was 0.1    6/13 to 6/18/24: Pt was hospitalized at [facility] and underwent to the OR for washout of his lumbar spine on 6/14. OR cx's were neg. He was d/c'd on Vanco to complete 6 week course of IV abx and then would be placed on Doxycycline for chronic abx suppression until decision to remove hardware was made. Stop date for IV Vanco was 8/9/24 6/28 to 7/3/24: Pt was re-admitted at [facility] and was dx'd with prostatitis with two prostatic abscesses seen on MRI study. He was d/c'd on po Levaquin to be completed on 8/12.    7/22/24: Vanco was changed to Dapto because of rising creatinine    8/9/24: Dapto was completed    8/10/24: Pt started po Doxycycline for chronic abx suppression    8/12/24: Pt completed course of Levaquin for prostatitis    On admission, Pt did not have fever but WBC count was mildly elevated at 12.9K. UA did not show any pyuria. I reviewed admission CXR which did not show any pulm infilts. CT of  L-spine showed lucency surrounding S1 pedicle screws c/w loosening, There was discitis/osteo at L5-S1 and to a lesser extent at L1-2. Pt was started on Vanco and Cefepime. ESR is 32 and CRP is 11.5    Pt feels better today. No further N/V. WBC count has decreased to 10.6K. Bld cx's are neg so far.     Pt denied fever, chills, cough, SOB, CP, abd pain, diarrhea, or dysuria        Inpatient consult to Infectious Diseases  Consult performed by: Julia Quiroz MD  Consult ordered by: Silvio Barrios PA-C          ROS: See HPI. 14 systems reviewed, remainder is neg.    Historical Information   Past Medical History:   Diagnosis Date    Anxiety     Arthritis     Cancer (HCC)     Chronic back pain     Depression     Diabetes mellitus (HCC)     Hypertension     Liver disease     Mitral valve prolapse     Peripheral neuropathy     Neuropathy    PONV (postoperative nausea and vomiting)     Thyroid disease      Past Surgical History:   Procedure Laterality Date    COLONOSCOPY      INCISION AND DRAINAGE OF WOUND Left 08/12/2022    Procedure: INCISION AND DRAINAGE (I&D) EXTREMITY;  Surgeon: Moustapha Cote DPM;  Location:  MAIN OR;  Service: Podiatry    IR BIOPSY SPINE  1/30/2024    IR BIOPSY SPINE  2/1/2024    IR PICC PLACEMENT SINGLE LUMEN  2/6/2024    JOINT REPLACEMENT Left 03/11/2022    Left TSA    CO ARTHRODESIS POSTERIOR/PSTLAT TQ 1NTRSPC LUMBAR Bilateral 04/11/2023    Procedure: L1-S1 navigated posterior decompression with instrumented fixation fusion;  Surgeon: James Moraes MD;  Location:  MAIN OR;  Service: Neurosurgery    THYROID SURGERY  2021    remove cancer     Social History   Social History     Substance and Sexual Activity   Alcohol Use Never     Social History     Substance and Sexual Activity   Drug Use Yes    Frequency: 7.0 times per week    Types: Marijuana    Comment: Medical Marijuana, takes for pain, daily, vape     Social History     Tobacco Use   Smoking Status Former    Current packs/day: 0.00     Average packs/day: 0.3 packs/day for 5.9 years (1.5 ttl pk-yrs)    Types: Cigarettes    Start date: 1975    Quit date: 1981    Years since quittin.2   Smokeless Tobacco Never   Tobacco Comments    quit ; smokes when in pain as of 26     Family History   Problem Relation Age of Onset    No Known Problems Father     No Known Problems Mother     Colon cancer Neg Hx        Meds/Allergies   MEDS:  Vanco: #2  Cefepime: #2      Current Facility-Administered Medications:     acetaminophen (TYLENOL) tablet 650 mg, 650 mg, Oral, Q6H PRN, Silvio Barrios PA-C    aspirin (ECOTRIN LOW STRENGTH) EC tablet 81 mg, 81 mg, Oral, Daily, Silvio Barrios PA-C, 81 mg at 24 0825    cefepime (MAXIPIME) IVPB (premix in dextrose) 2,000 mg 50 mL, 2,000 mg, Intravenous, Q8H, Julia Quiroz MD, Last Rate: 100 mL/hr at 24 1554, 2,000 mg at 24 1554    clonazePAM (KlonoPIN) tablet 1 mg, 1 mg, Oral, BID, Silvio Barrios PA-C, 1 mg at 24 0825    escitalopram (LEXAPRO) tablet 20 mg, 20 mg, Oral, Daily, Silvio Barrios PA-C, 20 mg at 24 0825    finasteride (PROSCAR) tablet 5 mg, 5 mg, Oral, Daily, Silvio Barrios PA-C, 5 mg at 24 0825    gabapentin (NEURONTIN) capsule 100 mg, 100 mg, Oral, Daily, Silvio Barrios PA-C, 100 mg at 24 0826    HYDROmorphone (DILAUDID) injection 0.5 mg, 0.5 mg, Intravenous, Q2H PRN, Silvio Barrios PA-C    hydrOXYzine HCL (ATARAX) tablet 50 mg, 50 mg, Oral, BID PRN, Silvio Barrios PA-C    insulin lispro (HumALOG/ADMELOG) 100 units/mL subcutaneous injection 1-6 Units, 1-6 Units, Subcutaneous, TID AC, 1 Units at 24 1322 **AND** Fingerstick Glucose (POCT), , , TID AC, Silvio Barrios PA-C    insulin lispro (HumALOG/ADMELOG) 100 units/mL subcutaneous injection 1-6 Units, 1-6 Units, Subcutaneous, HS, Silvio Barrios PA-C, 1 Units at 08/15/24 8273    lactobacillus acidophilus-bulgaricus (FLORANEX) packet 1 packet, 1 packet, Oral, TID With Meals, Silvio  DANIEL Barrios, 1 packet at 08/16/24 1553    lidocaine (LIDODERM) 5 % patch 1 patch, 1 patch, Topical, Daily, Silvio Barrios PA-C    methocarbamol (ROBAXIN) tablet 750 mg, 750 mg, Oral, Q6H PRN, Silvio Barrios PA-C, 750 mg at 08/16/24 1506    metoprolol succinate (TOPROL-XL) 24 hr tablet 50 mg, 50 mg, Oral, BID, Silvio Barrios PA-C, 50 mg at 08/16/24 0825    mirtazapine (REMERON) tablet 45 mg, 45 mg, Oral, HS, Silvio Barrios PA-C, 45 mg at 08/15/24 2236    NIFEdipine (PROCARDIA XL) 24 hr tablet 30 mg, 30 mg, Oral, Daily, Silvio Barrios PA-C, 30 mg at 08/16/24 0840    NIFEdipine (PROCARDIA XL) 24 hr tablet 90 mg, 90 mg, Oral, QAM, Silvio Barrios PA-C, 90 mg at 08/16/24 0840    ondansetron (ZOFRAN) injection 4 mg, 4 mg, Intravenous, Q6H PRN, Silvio Barrios PA-C    oxyCODONE (ROXICODONE) IR tablet 5 mg, 5 mg, Oral, Q4H PRN **OR** oxyCODONE (ROXICODONE) immediate release tablet 10 mg, 10 mg, Oral, Q4H PRN, Silvio Barrios PA-C, 10 mg at 08/16/24 1506    polyethylene glycol (MIRALAX) packet 17 g, 17 g, Oral, Daily PRN, Silvio Barrios PA-C    pravastatin (PRAVACHOL) tablet 20 mg, 20 mg, Oral, Daily With Dinner, Silvio Barrios PA-C, 20 mg at 08/16/24 1553    senna (SENOKOT) tablet 8.6 mg, 8.6 mg, Oral, BID, Silvio Barrios PA-C, 8.6 mg at 08/16/24 0827    sodium chloride 0.9 % infusion, 125 mL/hr, Intravenous, Continuous, Silvio Barrios PA-C, Last Rate: 125 mL/hr at 08/16/24 1500, 125 mL/hr at 08/16/24 1500    tamsulosin (FLOMAX) capsule 0.4 mg, 0.4 mg, Oral, Daily With Dinner, Silvio Barrios PA-C, 0.4 mg at 08/16/24 1553    vancomycin (VANCOCIN) IVPB (premix in dextrose) 1,000 mg 200 mL, 1,000 mg, Intravenous, Q12H, Silvio Barrios PA-C, Last Rate: 200 mL/hr at 08/16/24 0357, 1,000 mg at 08/16/24 0357    Allergies   Allergen Reactions    Abilify [Aripiprazole] Tremor     Shaking      Cephalexin Diarrhea    Molds & Smuts Allergic Rhinitis    Pregabalin Tremor     Lyrica - shaking feeling          Intake/Output Summary (Last 24 hours) at 8/16/2024 1557  Last data filed at 8/16/2024 0501  Gross per 24 hour   Intake 2069 ml   Output 1525 ml   Net 544 ml       PE:  WD, WN, WM who has chronic low back pain  VSS, Tmax: 98.5  HEENT:  No scleral icterus, pharynx clear  NECK: Supple  CARDIAC:  RRR, nml S1, S2  LUNGS:  Clear  ABDOMEN:  +BS, soft, nontender  MUSCULOSKELETAL:  No calf tenderness  EXTREMITIES:  No LE edema  SKIN:  +Well-healed lumbar incision - no redness or drainage  NEURO:  Grossly nonfocal    Invasive Devices:   PICC Line 06/03/24 Right Brachial (Active)       Peripheral IV 08/15/24 Right Antecubital (Active)   Site Assessment WDL;Clean;Dry;Intact 08/16/24 0800   Dressing Type Transparent 08/16/24 0800   Line Status Flushed;Infusing 08/16/24 0800   Dressing Status Clean;Dry;Intact 08/16/24 0800   Dressing Change Due 08/19/24 08/16/24 0500   Reason Not Rotated Not due 08/16/24 0500       Peripheral IV 08/15/24 Distal;Left;Upper;Ventral (anterior) Arm (Active)   Site Assessment WDL;Clean;Dry;Intact 08/16/24 0800   Dressing Type Transparent 08/16/24 0800   Line Status Occluded 08/16/24 0800   Dressing Status Clean;Dry;Intact 08/16/24 0800   Dressing Change Due 08/19/24 08/16/24 0500   Reason Not Rotated Not due 08/16/24 0500           Lab Results:   Admission on 08/15/2024   Component Date Value    WBC 08/15/2024 12.95 (H)     RBC 08/15/2024 4.50     Hemoglobin 08/15/2024 11.2 (L)     Hematocrit 08/15/2024 35.5 (L)     MCV 08/15/2024 79 (L)     MCH 08/15/2024 24.9 (L)     MCHC 08/15/2024 31.5     RDW 08/15/2024 18.3 (H)     MPV 08/15/2024 10.5     Platelets 08/15/2024 458 (H)     Sodium 08/15/2024 139     Potassium 08/15/2024 3.5     Chloride 08/15/2024 98     CO2 08/15/2024 24     ANION GAP 08/15/2024 17 (H)     BUN 08/15/2024 19     Creatinine 08/15/2024 0.85     Glucose 08/15/2024 188 (H)     Calcium 08/15/2024 9.9     AST 08/15/2024 13     ALT 08/15/2024 7     Alkaline Phosphatase 08/15/2024  82     Total Protein 08/15/2024 7.9     Albumin 08/15/2024 4.6     Total Bilirubin 08/15/2024 0.49     eGFR 08/15/2024 90     Lipase 08/15/2024 9 (L)     Ventricular Rate 08/15/2024 80     Atrial Rate 08/15/2024 80     IL Interval 08/15/2024 154     QRSD Interval 08/15/2024 78     QT Interval 08/15/2024 396     QTC Interval 08/15/2024 456     P Axis 08/15/2024 69     QRS [place] 08/15/2024 78     T Wave [place] 08/15/2024 71     LACTIC ACID 08/15/2024 4.7 (HH)     Procalcitonin 08/15/2024 0.07     Protime 08/15/2024 13.6     INR 08/15/2024 0.99     PTT 08/15/2024 33     Blood Culture 08/15/2024 Received in Microbiology Lab. Culture in Progress.     Blood Culture 08/15/2024 Received in Microbiology Lab. Culture in Progress.     Color, UA 08/15/2024 Light Yellow     Clarity, UA 08/15/2024 Clear     Specific Gravity, UA 08/15/2024 1.010     pH, UA 08/15/2024 6.0     Leukocytes, UA 08/15/2024 Small (A)     Nitrite, UA 08/15/2024 Negative     Protein, UA 08/15/2024 Trace (A)     Glucose, UA 08/15/2024 1000 (1%) (A)     Ketones, UA 08/15/2024 10 (1+) (A)     Urobilinogen, UA 08/15/2024 <2.0     Bilirubin, UA 08/15/2024 Negative     Occult Blood, UA 08/15/2024 Trace (A)     SARS-CoV-2 08/15/2024 Negative     INFLUENZA A PCR 08/15/2024 Negative     INFLUENZA B PCR 08/15/2024 Negative     RSV PCR 08/15/2024 Negative     hs TnI 0hr 08/15/2024 8     Sed Rate 08/15/2024 32 (H)     CRP 08/15/2024 11.5 (H)     RBC, UA 08/15/2024 2-4     WBC, UA 08/15/2024 2-4     Epithelial Cells 08/15/2024 None Seen     Bacteria, UA 08/15/2024 Occasional     MUCUS THREADS 08/15/2024 None Seen     hs TnI 2hr 08/15/2024 9     Delta 2hr hsTnI 08/15/2024 1     Segmented % 08/15/2024 97 (H)     Lymphocytes % 08/15/2024 3 (L)     Monocytes % 08/15/2024 0 (L)     Eosinophils % 08/15/2024 0     Basophils % 08/15/2024 0     Absolute Neutrophils 08/15/2024 12.56 (H)     Absolute Lymphocytes 08/15/2024 0.39 (L)     Absolute Monocytes 08/15/2024 0.00     Absolute  Eosinophils 08/15/2024 0.00     Absolute Basophils 08/15/2024 0.00     RBC Morphology 08/15/2024 Present     Platelet Estimate 08/15/2024 Adequate     Anisocytosis 08/15/2024 Present     LACTIC ACID 08/15/2024 2.8 (H)     POC Glucose 08/15/2024 170 (H)     Sodium 08/16/2024 141     Potassium 08/16/2024 3.1 (L)     Chloride 08/16/2024 105     CO2 08/16/2024 28     ANION GAP 08/16/2024 8     BUN 08/16/2024 15     Creatinine 08/16/2024 0.70     Glucose 08/16/2024 151 (H)     Calcium 08/16/2024 8.8     AST 08/16/2024 13     ALT 08/16/2024 7     Alkaline Phosphatase 08/16/2024 68     Total Protein 08/16/2024 6.7     Albumin 08/16/2024 4.0     Total Bilirubin 08/16/2024 0.43     eGFR 08/16/2024 97     WBC 08/16/2024 10.68 (H)     RBC 08/16/2024 4.28     Hemoglobin 08/16/2024 10.7 (L)     Hematocrit 08/16/2024 34.1 (L)     MCV 08/16/2024 80 (L)     MCH 08/16/2024 25.0 (L)     MCHC 08/16/2024 31.4     RDW 08/16/2024 18.4 (H)     MPV 08/16/2024 9.9     Platelets 08/16/2024 395 (H)     nRBC 08/16/2024 0     Segmented % 08/16/2024 76 (H)     Immature Grans % 08/16/2024 1     Lymphocytes % 08/16/2024 13 (L)     Monocytes % 08/16/2024 10     Eosinophils Relative 08/16/2024 0     Basophils Relative 08/16/2024 0     Absolute Neutrophils 08/16/2024 8.15 (H)     Absolute Immature Grans 08/16/2024 0.11     Absolute Lymphocytes 08/16/2024 1.36     Absolute Monocytes 08/16/2024 1.01     Eosinophils Absolute 08/16/2024 0.01     Basophils Absolute 08/16/2024 0.04     POC Glucose 08/16/2024 131     POC Glucose 08/16/2024 173 (H)      Imaging Studies: I have personally reviewed pertinent films in PACS  EKG, Pathology, and Other Studies: I have personally reviewed pertinent reports.      Culture  Lab Results   Component Value Date    BLOODCX Received in Microbiology Lab. Culture in Progress. 08/15/2024    BLOODCX Received in Microbiology Lab. Culture in Progress. 08/15/2024    BLOODCX No Growth After 5 Days. 06/27/2024    BLOODCX No Growth  "After 5 Days. 06/27/2024    BLOODCX No Growth After 5 Days. 05/18/2024    BLOODCX No Growth After 5 Days. 05/18/2024    BLOODCX No Growth After 5 Days. 01/24/2024    BLOODCX No Growth After 5 Days. 01/24/2024    BLOODCX No Growth After 5 Days. 10/09/2023    BLOODCX No Growth After 5 Days. 10/09/2023    BLOODCX No Growth After 5 Days. 04/19/2023    BLOODCX No Growth After 5 Days. 04/19/2023    BLOODCX No Growth After 5 Days. 08/09/2022    BLOODCX No Growth After 5 Days. 08/09/2022     Lab Results   Component Value Date    WOUNDCULT No growth 02/01/2024    WOUNDCULT 2+ Growth of Citrobacter koseri (A) 08/10/2022    WOUNDCULT 2+ Growth of 08/10/2022     Lab Results   Component Value Date    URINECX 60,000-69,000 cfu/ml Candida glabrata (A) 07/13/2024    URINECX >100,000 cfu/ml Klebsiella pneumoniae (A) 10/10/2023    URINECX >100,000 cfu/ml Escherichia coli (A) 08/29/2023    URINECX 50,000-59,000 cfu/ml Escherichia coli (A) 06/29/2023    URINECX <10,000 cfu/ml Gram Negative Vahe (A) 06/29/2023    URINECX No Growth <1000 cfu/mL 04/20/2023     No results found for: \"SPUTUMCULTUR\"    Principal Problem:    Sepsis with acute organ dysfunction (HCC)  Active Problems:    Hypertensive urgency    Status post lumbar spinal fusion    Acute on chronic bilateral low back pain with sciatica    Anxiety and depression    Type 2 diabetes mellitus with hyperglycemia, without long-term current use of insulin (HCC)         "

## 2024-08-16 NOTE — PROGRESS NOTES
Juan San is a 69 y.o. male who is currently ordered Vancomycin IV with management by the Pharmacy Consult service.  Relevant clinical data and objective / subjective history reviewed.  Vancomycin Assessment:  Indication and Goal AUC/Trough: Bacteremia (goal -600, trough >10), -600, trough >10  Clinical Status: improving  Micro:     Renal Function:  SCr: 0.7 mg/dL  CrCl: 99.1 mL/min  Renal replacement: Not on dialysis  Days of Therapy: 2  Current Dose: 1000 mg Q12h  Vancomycin Plan:  New Dosin mg Q 12 H  Estimated AUC: 435 mcg*hr/mL  Estimated Trough: 11.4 mcg/mL  Next Level:with AM labs on 24  Renal Function Monitoring: Daily BMP and UOP  Pharmacy will continue to follow closely for s/sx of nephrotoxicity, infusion reactions and appropriateness of therapy.  BMP and CBC will be ordered per protocol. We will continue to follow the patient’s culture results and clinical progress daily.    Anne May, Pharmacist

## 2024-08-16 NOTE — UTILIZATION REVIEW
Initial Clinical Review    Admission: Date/Time/Statement:   Admission Orders (From admission, onward)       Ordered        08/15/24 1712  INPATIENT ADMISSION  Once                          Orders Placed This Encounter   Procedures    INPATIENT ADMISSION     Standing Status:   Standing     Number of Occurrences:   1     Order Specific Question:   Level of Care     Answer:   Med Surg [16]     Order Specific Question:   Estimated length of stay     Answer:   More than 2 Midnights     Order Specific Question:   Certification     Answer:   I certify that inpatient services are medically necessary for this patient for a duration of greater than two midnights. See H&P and MD Progress Notes for additional information about the patient's course of treatment.     ED Arrival Information       Expected   -    Arrival   8/15/2024 13:42    Acuity   Emergent              Means of arrival   Wheelchair    Escorted by   Family Member    Service   Hospitalist    Admission type   Emergency              Arrival complaint   vomiting, back pain             Chief Complaint   Patient presents with    Vomiting     Patient reports to ED c/o nausea and vomiting since this morning. Unable to keep food, fluids or medication down. Reports back pain that radiates down bilateral legs, hx of back surgery.        Initial Presentation: 69 y.o. male  to ED via private vehicle from home.    Admitted to inpatient with Dx: Sepsis/hypertensive urgency/Type 2 DM with hyperglycemia/status post lumbar fusion.  Presented to ED with nausea and vomiting starting morning of arrival. + chills.   Has low back pain radiating to pelvis, on doxycycline, IV until yesterday, now po due to recurrent back infections and scheduled for hardware removal at [facility] on 8/27/24. PMHx:  hypertension, type 2 diabetes, chronic back pain status post lumbar fusion on oral doxycycline therapy for hardware infection . On exam: tachycardia.  Venous stasis ulcer to right foot and ankle.  "  Sed rate  32.  Wbc 12.95.  H&H 11.2/35.5.  lactic acid 4.7.  CRP 11.5. Imaging shows \"Stable chronic discitis osteomyelitis at L5-S1 and lesser extent L1-L2. Endplate irregularity again noted at L2-L3 and L4-L5, stable\". ED treatment:  discussed with TONI Rincon and no need transfer, suggests continuing antibiotics for sepsis.    Given 2.409 L of IVF, Reglan, IV analgesia x 2, started on cefepime and vancomycin.    Plan includes to continue IV hydration, cefepime and vancomycin.  Follow cultures.  Consult ID.  Monitor BP, treat pain, Resume home regimen of Procardia 120 mg daily, Toprol-XL 50 mg twice daily. Give short acting Procardia and labetalol tonight. Hold home metformin and start SSI.  Resume home Lexapro, Atarax, Remeron     Anticipated Length of Stay/Certification Statement: Patient will be admitted on an inpatient basis with an anticipated length of stay of greater than 2 midnights secondary to severe sepsis due to hardware infection and back, possible bacteremia.     Date: 8/16/24    Day 2:  has continued back pain, rates 9/10.  Some urinary urgency.   On exam - decreased LLE & RLE sensation with numbness.  Venous stasis ulcers. K 3.1.  wbc 10.68.  blood cultures in progress.  ID pending.  Continue antibiotics. Pain control.  Monitor BP on home medications and control pain.  Consult Podiatry     8/16/24 per ID - \"Nausea/Vomiting/Leukocytosis/Post-op Lumbar discitis/osteo with hardware infxn/Chronic abx suppression: Pt presented with c/o N/V x 1 day. Pt was taking Doxycycline for chronic abx suppression of his lumbar spine infxn with involvement of his hardware following lumbar laminectomies with T12-S1 fusion on 4/11/23\"   possible etiology of nausea & vomiting could be oral doxycycline.  Recommend to continue vancomycin and cefepime while blood cultures pending.  Repeat prostate MRI to see if abscesses have resolved after prolonged course of po Levaquin which was completed on 8/12.  IF blood cultures " negative, stop IV antibiotics and restart doxycycline. Follow up with neurosurgery at [place].      ED Triage Vitals [08/15/24 1359]   Temperature Pulse Respirations Blood Pressure SpO2 Pain Score   (!) 97.1 °F (36.2 °C) 102 20 (!) 189/92 100 % 10 - Worst Possible Pain     Weight (last 2 days)       Date/Time Weight    08/15/24 1900 75.1 (165.57)    08/15/24 1359 80.3 (177)            Vital Signs (last 3 days)       Date/Time Temp Pulse Resp BP MAP (mmHg) SpO2 O2 Device Patient Position - Orthostatic VS [place] Coma Scale Score Pain    08/16/24 0833 -- -- -- -- -- -- None (Room air) -- 15 9    08/16/24 08:03:24 97.7 °F (36.5 °C) 77 18 176/93 121 100 % -- -- -- --    08/16/24 0500 -- -- -- -- -- -- -- -- 15 --    08/15/24 22:36:56 -- 91 -- 145/76 99 98 % -- -- -- --    08/15/24 19:45:10 98.5 °F (36.9 °C) 96 18 146/73 97 99 % -- -- -- --    08/15/24 1944 98.5 °F (36.9 °C) -- 18 146/73 -- -- -- Lying -- 10 - Worst Possible Pain    08/15/24 1843 -- -- -- -- -- -- -- -- -- 10 - Worst Possible Pain    08/15/24 18:16:49 97.8 °F (36.6 °C) 99 16 204/104 137 100 % None (Room air) Lying -- --    08/15/24 1730 -- 95 20 220/98 141 100 % -- -- -- --    08/15/24 1715 -- 96 19 214/85 134 100 % -- -- -- --    08/15/24 1700 -- 96 20 217/96 138 100 % -- -- -- --    08/15/24 1646 -- -- -- -- -- 100 % -- -- -- --    08/15/24 1645 -- 100 18 161/102 116 -- -- -- -- --    08/15/24 1630 -- 97 20 216/98 140 100 % -- -- -- --    08/15/24 1623 -- -- -- -- -- -- -- -- -- 10 - Worst Possible Pain    08/15/24 1615 -- 99 20 208/98 141 100 % -- -- -- --    08/15/24 1600 -- 100 20 230/98 140 100 % -- -- -- --    08/15/24 1530 -- 99 20 202/90 129 99 % -- -- -- --    08/15/24 1507 -- -- -- -- -- -- -- -- -- 10 - Worst Possible Pain    08/15/24 1500 -- 96 20 236/109 156 100 % -- -- -- --    08/15/24 1430 -- 93 19 225/94 135 100 % None (Room air) Lying -- --    08/15/24 1424 -- -- -- -- -- -- None (Room air) -- 15 --    08/15/24 1416 -- 77 20 219/95 137  100 % -- -- -- --    08/15/24 1359 97.1 °F (36.2 °C) 102 20 189/92 -- 100 % None (Room air) Sitting -- 10 - Worst Possible Pain            Pertinent Labs/Diagnostic Test Results:   Radiology:  XR chest 1 view portable   ED Interpretation by Libertad Hansen PA-C (08/15 [id number])   No acute abnormalities.       Final Interpretation by Silvio Cao MD (08/16 4875)      No acute cardiopulmonary disease.            Workstation performed: [id number]         CT spine lumbar w contrast   Final Interpretation by Paresh Estrada MD (08/15 8791)      No significant change since prior examination from 6/27/2024. No new areas of discitis osteomyelitis identified.      Stable chronic discitis osteomyelitis at L5-S1 and lesser extent L1-L2. Endplate irregularity again noted at L2-L3 and L4-L5, stable.      No discrete paraspinal collections are seen. Limited evaluation for a collection within the spinal canal secondary to streak artifact from orthopedic hardware.      Workstation performed: [id number]           Cardiology:  Date/Time: 8/15/2024 2:15 PM  Indications / Diagnosis:  Tachycardia  ECG reviewed by me, the ED Provider: yes    Patient location:  ED  Previous ECG:     Previous ECG:  Compared to current    Similarity:  No change  Rate:     ECG rate:  80  Rhythm:     Rhythm: sinus rhythm    Ectopy:     Ectopy: PAC    ST segments:     ST segments:  Normal  T waves:     T waves: normal      Results from last 7 days   Lab Units 08/15/24  1437   SARS-COV-2  Negative     Results from last 7 days   Lab Units 08/16/24  0507 08/15/24  1401   WBC Thousand/uL 10.68* 12.95*   HEMOGLOBIN g/dL 10.7* 11.2*   HEMATOCRIT % 34.1* 35.5*   PLATELETS Thousands/uL 395* 458*   TOTAL NEUT ABS Thousands/µL 8.15*  --      Results from last 7 days   Lab Units 08/16/24  0507 08/15/24  1401 08/13/24  1445   SODIUM mmol/L 141 139 138   POTASSIUM mmol/L 3.1* 3.5 4.3   CHLORIDE mmol/L 105 98 98*   CO2 mmol/L 28 24 24   ANION GAP mmol/L 8 17* 16*   BUN  mg/dL 15 19 24*   CREATININE mg/dL 0.70 0.85 1.17   EGFR ml/min/1.73sq m 97 90 68   CALCIUM mg/dL 8.8 9.9 9.0     Results from last 7 days   Lab Units 08/16/24  0507 08/15/24  1401 08/13/24  1445   AST U/L 13 13 12*   ALT U/L 7 7 7*   ALK PHOS U/L 68 82 69   TOTAL PROTEIN g/dL 6.7 7.9 6.7   ALBUMIN g/dL 4.0 4.6 4.1   TOTAL BILIRUBIN mg/dL 0.43 0.49 0.3     Results from last 7 days   Lab Units 08/16/24  1150 08/16/24  0800 08/15/24  1942   POC GLUCOSE mg/dl 173* 131 170*     Results from last 7 days   Lab Units 08/16/24  0507 08/15/24  1401 08/13/24  1445   GLUCOSE RANDOM mg/dL 151* 188* 174*     Results from last 7 days   Lab Units 08/13/24  1445   CK TOTAL U/L 54     Results from last 7 days   Lab Units 08/15/24  1653 08/15/24  1438   HS TNI 0HR ng/L  --  8   HS TNI 2HR ng/L 9  --    HSTNI D2 ng/L 1  --      Results from last 7 days   Lab Units 08/15/24  1438   PROTIME seconds 13.6   INR  0.99   PTT seconds 33     Results from last 7 days   Lab Units 08/15/24  1438   PROCALCITONIN ng/ml 0.07     Results from last 7 days   Lab Units 08/15/24  1839 08/15/24  1438   LACTIC ACID mmol/L 2.8* 4.7*     Results from last 7 days   Lab Units 08/15/24  1401   LIPASE u/L 9*     Results from last 7 days   Lab Units 08/15/24  1401   CRP mg/L 11.5*   SED RATE mm/hour 32*     Results from last 7 days   Lab Units 08/15/24  1436   CLARITY UA  Clear   COLOR UA  Light Yellow   SPEC GRAV UA  1.010   PH UA  6.0   GLUCOSE UA mg/dl 1000 (1%)*   KETONES UA mg/dl 10 (1+)*   BLOOD UA  Trace*   PROTEIN UA mg/dl Trace*   NITRITE UA  Negative   BILIRUBIN UA  Negative   UROBILINOGEN UA (BE) mg/dl <2.0   LEUKOCYTES UA  Small*   WBC UA /hpf 2-4   RBC UA /hpf 2-4   BACTERIA UA /hpf Occasional   EPITHELIAL CELLS WET PREP /hpf None Seen   MUCUS THREADS  None Seen     Results from last 7 days   Lab Units 08/15/24  1437   INFLUENZA A PCR  Negative   INFLUENZA B PCR  Negative   RSV PCR  Negative     Results from last 7 days   Lab Units 08/15/24  1443  08/15/24  1438   BLOOD CULTURE  Received in Microbiology Lab. Culture in Progress. Received in Microbiology Lab. Culture in Progress.       ED Treatment-Medication Administration from 08/15/2024 1342 to 08/15/2024 1810         Date/Time Order Dose Route Action     08/15/2024 1446 sodium chloride 0.9 % bolus 1,000 mL 1,000 mL Intravenous New Bag     08/15/2024 1446 metoclopramide (REGLAN) injection 10 mg 10 mg Intravenous Given     08/15/2024 1507 morphine injection 4 mg 4 mg Intravenous Given     08/15/2024 1547 cefepime (MAXIPIME) IVPB (premix in dextrose) 2,000 mg 50 mL 2,000 mg Intravenous New Bag     08/15/2024 1608 vancomycin (VANCOCIN) 1500 mg in sodium chloride 0.9% 250 mL IVPB 1,500 mg Intravenous New Bag     08/15/2024 1556 sodium chloride 0.9 % bolus 1,000 mL 1,000 mL Intravenous New Bag     08/15/2024 1623 HYDROmorphone (DILAUDID) injection 1 mg 1 mg Intravenous Given     08/15/2024 1619 sodium chloride 0.9 % bolus 500 mL 409 mL Intravenous New Bag            Past Medical History:   Diagnosis Date    Anxiety     Arthritis     Cancer (HCC)     Chronic back pain     Depression     Diabetes mellitus (HCC)     Hypertension     Liver disease     Mitral valve prolapse     Peripheral neuropathy     Neuropathy    PONV (postoperative nausea and vomiting)     Thyroid disease      Present on Admission:   Sepsis with acute organ dysfunction (HCC)   Type 2 diabetes mellitus with hyperglycemia, without long-term current use of insulin (HCC)   Hypertensive urgency   Acute on chronic bilateral low back pain with sciatica   Anxiety and depression      Admitting Diagnosis: Bacteremia [R78.81]  Vomiting [R11.10]  Nausea and vomiting [R11.2]  Severe sepsis (HCC) [A41.9, R65.20]  Age/Sex: 69 y.o. male  Admission Orders:  Scheduled Medications:  aspirin, 81 mg, Oral, Daily  cefepime, 2,000 mg, Intravenous, Q8H  clonazePAM, 1 mg, Oral, BID  escitalopram, 20 mg, Oral, Daily  finasteride, 5 mg, Oral, Daily  gabapentin, 100 mg,  Oral, Daily  insulin lispro, 1-6 Units, Subcutaneous, TID AC  insulin lispro, 1-6 Units, Subcutaneous, HS  lactobacillus acidophilus-bulgaricus, 1 packet, Oral, TID With Meals  lidocaine, 1 patch, Topical, Daily  metoprolol succinate, 50 mg, Oral, BID  mirtazapine, 45 mg, Oral, HS  NIFEdipine, 30 mg, Oral, Daily  NIFEdipine, 90 mg, Oral, QAM  pravastatin, 20 mg, Oral, Daily With Dinner  senna, 8.6 mg, Oral, BID  tamsulosin, 0.4 mg, Oral, Daily With Dinner  vancomycin, 1,000 mg, Intravenous, Q12H    labetalol (NORMODYNE) injection 10 mg  Dose: 10 mg  Freq: Once Route: IV  Start: 08/15/24 1900 End: 08/15/24 2004  NIFEdipine (PROCARDIA) capsule 30 mg  Dose: 30 mg  Freq: Once Route: PO  Start: 08/15/24 1900 End: 08/15/24 2237    Continuous IV Infusions:  sodium chloride, 125 mL/hr, Intravenous, Continuous      PRN Meds:  acetaminophen, 650 mg, Oral, Q6H PRN  HYDROmorphone, 0.5 mg, Intravenous, Q2H PRN  hydrOXYzine HCL, 50 mg, Oral, BID PRN  methocarbamol, 750 mg, Oral, Q6H PRN x 1 8/15.  X 1 8/16  ondansetron, 4 mg, Intravenous, Q6H PRN  oxyCODONE, 5 mg, Oral, Q4H PRN   Or  oxyCODONE, 10 mg, Oral, Q4H PRN x 1 8/15.  X 2 8/16  polyethylene glycol, 17 g, Oral, Daily PRN        IP CONSULT TO INFECTIOUS DISEASES  IP CONSULT TO PODIATRY    Network Utilization Review Department  ATTENTION: Please call with any questions or concerns to [phone] and carefully listen to the prompts so that you are directed to the right person. All voicemails are confidential.   For Discharge needs, contact Care Management DC Support Team at [phone] opt. 2  Send all requests for admission clinical reviews, approved or denied determinations and any other requests to dedicated fax number below belonging to the campus where the patient is receiving treatment. List of dedicated fax numbers for the Facilities:  FACILITY NAME UR FAX NUMBER   ADMISSION DENIALS (Administrative/Medical Necessity) [phone]   DISCHARGE SUPPORT TEAM (NETWORK)  [phone]   PARENT CHILD HEALTH (Maternity/NICU/Pediatrics) [phone]   [facility] [phone]   [facility] [phone]   [facility] [phone]   [facility] [phone]   [facility] [phone]   [facility] [phone]   [facility] [phone]   [facility] [phone]   [facility] [phone]   [facility] [phone]   [facility] [phone]   [facility] [phone]

## 2024-08-16 NOTE — CASE MANAGEMENT
Case Management Progress Note    Patient name Juan San  Location /-01 MRN [id number]  : 1955 Date 2024       LOS (days): 1  Geometric Mean LOS (GMLOS) (days): 5.1  Days to GMLOS:4.2        OBJECTIVE:        Current admission status: Inpatient  Preferred Pharmacy:   [facility] [address] [address]  Phone: [phone] Fax: [phone]    Primary Care Provider: Sabra Magaña DO    Primary Insurance: MEDICARE  Secondary Insurance: [facility]    PROGRESS NOTE:  CM contacted [facility] to confirm patient is still current. Maureen, the admissions coordinator at [facility], informed CM that pt was discharged from [facility] on  and the PICC line was removed on . CM kept referral open in [place] in the event Pt needs infusion care at discharge. [facility] is aware of open referral. Pt was informed of his discharge from [facility]3], and he understood.

## 2024-08-17 ENCOUNTER — APPOINTMENT (INPATIENT)
Dept: MRI IMAGING | Facility: HOSPITAL | Age: 69
DRG: 559 | End: 2024-08-17
Payer: MEDICARE

## 2024-08-17 LAB
ALBUMIN SERPL BCG-MCNC: 4.5 G/DL (ref 3.5–5)
ALP SERPL-CCNC: 80 U/L (ref 34–104)
ALT SERPL W P-5'-P-CCNC: 9 U/L (ref 7–52)
ANION GAP SERPL CALCULATED.3IONS-SCNC: 12 MMOL/L (ref 4–13)
AST SERPL W P-5'-P-CCNC: 21 U/L (ref 13–39)
BASOPHILS # BLD AUTO: 0.11 THOUSANDS/ÂΜL (ref 0–0.1)
BASOPHILS NFR BLD AUTO: 1 % (ref 0–1)
BILIRUB SERPL-MCNC: 0.58 MG/DL (ref 0.2–1)
BUN SERPL-MCNC: 10 MG/DL (ref 5–25)
CALCIUM SERPL-MCNC: 9.2 MG/DL (ref 8.4–10.2)
CHLORIDE SERPL-SCNC: 101 MMOL/L (ref 96–108)
CO2 SERPL-SCNC: 26 MMOL/L (ref 21–32)
CREAT SERPL-MCNC: 0.67 MG/DL (ref 0.6–1.3)
EOSINOPHIL # BLD AUTO: 0.8 THOUSAND/ÂΜL (ref 0–0.61)
EOSINOPHIL NFR BLD AUTO: 6 % (ref 0–6)
ERYTHROCYTE [DISTWIDTH] IN BLOOD BY AUTOMATED COUNT: 18.6 % (ref 11.6–15.1)
GFR SERPL CREATININE-BSD FRML MDRD: 99 ML/MIN/1.73SQ M
GLUCOSE SERPL-MCNC: 120 MG/DL (ref 65–140)
GLUCOSE SERPL-MCNC: 145 MG/DL (ref 65–140)
GLUCOSE SERPL-MCNC: 151 MG/DL (ref 65–140)
GLUCOSE SERPL-MCNC: 188 MG/DL (ref 65–140)
HCT VFR BLD AUTO: 37.3 % (ref 36.5–49.3)
HGB BLD-MCNC: 11.5 G/DL (ref 12–17)
IMM GRANULOCYTES # BLD AUTO: 0.03 THOUSAND/UL (ref 0–0.2)
IMM GRANULOCYTES NFR BLD AUTO: 0 % (ref 0–2)
LYMPHOCYTES # BLD AUTO: 1.59 THOUSANDS/ÂΜL (ref 0.6–4.47)
LYMPHOCYTES NFR BLD AUTO: 13 % (ref 14–44)
MCH RBC QN AUTO: 24.8 PG (ref 26.8–34.3)
MCHC RBC AUTO-ENTMCNC: 30.8 G/DL (ref 31.4–37.4)
MCV RBC AUTO: 81 FL (ref 82–98)
MONOCYTES # BLD AUTO: 0.97 THOUSAND/ÂΜL (ref 0.17–1.22)
MONOCYTES NFR BLD AUTO: 8 % (ref 4–12)
NEUTROPHILS # BLD AUTO: 9.01 THOUSANDS/ÂΜL (ref 1.85–7.62)
NEUTS SEG NFR BLD AUTO: 72 % (ref 43–75)
NRBC BLD AUTO-RTO: 0 /100 WBCS
PLATELET # BLD AUTO: 373 THOUSANDS/UL (ref 149–390)
PMV BLD AUTO: 9.9 FL (ref 8.9–12.7)
POTASSIUM SERPL-SCNC: 3.1 MMOL/L (ref 3.5–5.3)
PROT SERPL-MCNC: 7.6 G/DL (ref 6.4–8.4)
RBC # BLD AUTO: 4.63 MILLION/UL (ref 3.88–5.62)
SODIUM SERPL-SCNC: 139 MMOL/L (ref 135–147)
VANCOMYCIN TROUGH SERPL-MCNC: 12.6 UG/ML (ref 10–20)
WBC # BLD AUTO: 12.51 THOUSAND/UL (ref 4.31–10.16)

## 2024-08-17 PROCEDURE — 82948 REAGENT STRIP/BLOOD GLUCOSE: CPT

## 2024-08-17 PROCEDURE — 99232 SBSQ HOSP IP/OBS MODERATE 35: CPT | Performed by: HOSPITALIST

## 2024-08-17 PROCEDURE — 80202 ASSAY OF VANCOMYCIN: CPT | Performed by: HOSPITALIST

## 2024-08-17 PROCEDURE — 85025 COMPLETE CBC W/AUTO DIFF WBC: CPT | Performed by: HOSPITALIST

## 2024-08-17 PROCEDURE — 80053 COMPREHEN METABOLIC PANEL: CPT | Performed by: HOSPITALIST

## 2024-08-17 PROCEDURE — 72197 MRI PELVIS W/O & W/DYE: CPT

## 2024-08-17 PROCEDURE — A9585 GADOBUTROL INJECTION: HCPCS | Performed by: HOSPITALIST

## 2024-08-17 RX ORDER — GADOBUTROL 604.72 MG/ML
7.5 INJECTION INTRAVENOUS
Status: COMPLETED | OUTPATIENT
Start: 2024-08-17 | End: 2024-08-17

## 2024-08-17 RX ORDER — METOCLOPRAMIDE HYDROCHLORIDE 5 MG/ML
10 INJECTION INTRAMUSCULAR; INTRAVENOUS ONCE
Status: COMPLETED | OUTPATIENT
Start: 2024-08-17 | End: 2024-08-17

## 2024-08-17 RX ADMIN — SENNOSIDES 8.6 MG: 8.6 TABLET, FILM COATED ORAL at 17:18

## 2024-08-17 RX ADMIN — CEFEPIME HYDROCHLORIDE 2000 MG: 2 INJECTION, SOLUTION INTRAVENOUS at 15:04

## 2024-08-17 RX ADMIN — NIFEDIPINE 30 MG: 30 TABLET, EXTENDED RELEASE ORAL at 08:26

## 2024-08-17 RX ADMIN — OXYCODONE HYDROCHLORIDE 10 MG: 10 TABLET ORAL at 23:52

## 2024-08-17 RX ADMIN — NIFEDIPINE 90 MG: 30 TABLET, EXTENDED RELEASE ORAL at 08:22

## 2024-08-17 RX ADMIN — SODIUM CHLORIDE 125 ML/HR: 0.9 INJECTION, SOLUTION INTRAVENOUS at 21:05

## 2024-08-17 RX ADMIN — SODIUM CHLORIDE 125 ML/HR: 0.9 INJECTION, SOLUTION INTRAVENOUS at 00:29

## 2024-08-17 RX ADMIN — FINASTERIDE 5 MG: 5 TABLET, FILM COATED ORAL at 08:23

## 2024-08-17 RX ADMIN — HYDROMORPHONE HYDROCHLORIDE 0.5 MG: 1 INJECTION, SOLUTION INTRAMUSCULAR; INTRAVENOUS; SUBCUTANEOUS at 17:18

## 2024-08-17 RX ADMIN — ONDANSETRON 4 MG: 2 INJECTION, SOLUTION INTRAMUSCULAR; INTRAVENOUS at 03:48

## 2024-08-17 RX ADMIN — METHOCARBAMOL TABLETS 750 MG: 500 TABLET, COATED ORAL at 15:04

## 2024-08-17 RX ADMIN — LACTOBACILLUS ACIDOPHILUS / LACTOBACILLUS BULGARICUS 1 PACKET: 100 MILLION CFU STRENGTH GRANULES at 15:57

## 2024-08-17 RX ADMIN — CLONAZEPAM 1 MG: 1 TABLET ORAL at 17:18

## 2024-08-17 RX ADMIN — OXYCODONE HYDROCHLORIDE 10 MG: 10 TABLET ORAL at 02:36

## 2024-08-17 RX ADMIN — PRAVASTATIN SODIUM 20 MG: 20 TABLET ORAL at 15:58

## 2024-08-17 RX ADMIN — HYDROXYZINE HYDROCHLORIDE 50 MG: 25 TABLET ORAL at 12:46

## 2024-08-17 RX ADMIN — GABAPENTIN 100 MG: 100 CAPSULE ORAL at 08:23

## 2024-08-17 RX ADMIN — OXYCODONE HYDROCHLORIDE 10 MG: 10 TABLET ORAL at 15:04

## 2024-08-17 RX ADMIN — OXYCODONE HYDROCHLORIDE 10 MG: 10 TABLET ORAL at 07:48

## 2024-08-17 RX ADMIN — GADOBUTROL 7.5 ML: 604.72 INJECTION INTRAVENOUS at 11:52

## 2024-08-17 RX ADMIN — VANCOMYCIN HYDROCHLORIDE 1000 MG: 1 INJECTION, SOLUTION INTRAVENOUS at 17:18

## 2024-08-17 RX ADMIN — LIDOCAINE 1 PATCH: 50 PATCH CUTANEOUS at 08:24

## 2024-08-17 RX ADMIN — LACTOBACILLUS ACIDOPHILUS / LACTOBACILLUS BULGARICUS 1 PACKET: 100 MILLION CFU STRENGTH GRANULES at 08:22

## 2024-08-17 RX ADMIN — CEFEPIME HYDROCHLORIDE 2000 MG: 2 INJECTION, SOLUTION INTRAVENOUS at 08:24

## 2024-08-17 RX ADMIN — METOCLOPRAMIDE 10 MG: 5 INJECTION, SOLUTION INTRAMUSCULAR; INTRAVENOUS at 06:26

## 2024-08-17 RX ADMIN — HYDROMORPHONE HYDROCHLORIDE 0.5 MG: 1 INJECTION, SOLUTION INTRAMUSCULAR; INTRAVENOUS; SUBCUTANEOUS at 20:58

## 2024-08-17 RX ADMIN — OXYCODONE HYDROCHLORIDE 10 MG: 10 TABLET ORAL at 19:40

## 2024-08-17 RX ADMIN — CLONAZEPAM 1 MG: 1 TABLET ORAL at 08:23

## 2024-08-17 RX ADMIN — ASPIRIN 81 MG: 81 TABLET, COATED ORAL at 08:22

## 2024-08-17 RX ADMIN — METOPROLOL SUCCINATE 50 MG: 50 TABLET, EXTENDED RELEASE ORAL at 08:23

## 2024-08-17 RX ADMIN — ESCITALOPRAM OXALATE 20 MG: 20 TABLET ORAL at 08:23

## 2024-08-17 RX ADMIN — TAMSULOSIN HYDROCHLORIDE 0.4 MG: 0.4 CAPSULE ORAL at 15:58

## 2024-08-17 RX ADMIN — VANCOMYCIN HYDROCHLORIDE 1000 MG: 1 INJECTION, SOLUTION INTRAVENOUS at 03:52

## 2024-08-17 RX ADMIN — TRIMETHOBENZAMIDE HYDROCHLORIDE 200 MG: 100 INJECTION INTRAMUSCULAR at 05:15

## 2024-08-17 RX ADMIN — HYDROMORPHONE HYDROCHLORIDE 0.5 MG: 1 INJECTION, SOLUTION INTRAMUSCULAR; INTRAVENOUS; SUBCUTANEOUS at 12:33

## 2024-08-17 RX ADMIN — METOPROLOL SUCCINATE 50 MG: 50 TABLET, EXTENDED RELEASE ORAL at 20:59

## 2024-08-17 RX ADMIN — INSULIN LISPRO 1 UNITS: 100 INJECTION, SOLUTION INTRAVENOUS; SUBCUTANEOUS at 21:00

## 2024-08-17 RX ADMIN — SENNOSIDES 8.6 MG: 8.6 TABLET, FILM COATED ORAL at 08:23

## 2024-08-17 RX ADMIN — MIRTAZAPINE 45 MG: 15 TABLET, FILM COATED ORAL at 20:59

## 2024-08-17 RX ADMIN — INSULIN LISPRO 1 UNITS: 100 INJECTION, SOLUTION INTRAVENOUS; SUBCUTANEOUS at 12:33

## 2024-08-17 RX ADMIN — CEFEPIME HYDROCHLORIDE 2000 MG: 2 INJECTION, SOLUTION INTRAVENOUS at 23:52

## 2024-08-17 NOTE — UTILIZATION REVIEW
NOTIFICATION OF INPATIENT ADMISSION   AUTHORIZATION REQUEST   SERVICING FACILITY:   [place] [address]  Tax ID: [id number]  NPI: [id number] ATTENDING PROVIDER:  Attending Name and NPI#: Harinder Marin Md [[id number]]  Address: [address] [place] [address]  Phone: [phone]   ADMISSION INFORMATION:  Place of Service: Inpatient [facility]  Place of Service Code: 21  Inpatient Admission Date/Time: 8/15/24  5:12 PM  Discharge Date/Time: No discharge date for patient encounter.  Admitting Diagnosis Code/Description:  Bacteremia [R78.81]  Vomiting [R11.10]  Nausea and vomiting [R11.2]  Severe sepsis (HCC) [A41.9, R65.20]     UTILIZATION REVIEW CONTACT:  Ashly Resendiz, Utilization   Network Utilization Review Department  Phone: [phone]  Fax: [phone]  Email: [email or web address]  Contact for approvals/pending authorizations, clinical reviews, and discharge.     PHYSICIAN ADVISORY SERVICES:  Medical Necessity Denial & [place] Review  Phone: [phone]  Fax: [phone]  Email: [email or web address]     DISCHARGE SUPPORT TEAM:  For Patients Discharge Needs & Updates  Phone: [phone] opt. [phone] Fax: [phone]  Email: [email or web address]

## 2024-08-17 NOTE — CONSULTS
Consult - Podiatry   Juan San 69 y.o. male MRN: [id number]  Unit/Bed#: -01 Encounter: [id number]    Assessment & Plan     Idiopathic chronic venous hypertension of right foot with ulcer (HCC)   Idiopathic chronic venous hypertension of right leg with ulcer (HCC)    Dermagran gauze dressing to be changed every other day per nursing staff  Patient should continue follow-up at the wound care center as scheduled upon discharge.  No clinical signs of infection, podiatry will sign off and follow-up as needed if any issues develop.  Overall wounds have been progressing satisfactorily.    Type 2 diabetes with hyperglycemia, anxiety/depression, chronic bilateral low back pain, status post lumbar spinal fusion, hypertensive urgency, sepsis with acute organ dysfunction.  Managed per internal medicine, and infectious disease      History of Present Illness     HPI:  Juan San is a 69 y.o. male who presents with chronic back pain status post lumbar fusion at [facility].  Patient reports acute on chronic back pain with chills and nausea.  The symptoms developed within the past 24 hours of admission..    Podiatry consult requested to evaluate wounds on right foot/leg.    Patient's chart reviewed noting all pertinent clinical laboratory data.    Inpatient consult to Podiatry  Consult performed by: Moustapha Cote DPM  Consult ordered by: Jennifer Hunt MD        Review of Systems   Constitutional: Negative.    HENT: Negative.    Eyes: Negative.    Respiratory: Negative.    Cardiovascular: Negative denies any shortness of breath  Gastrointestinal: Negative.    Musculoskeletal: Limited ambulation due to chronic back pain  Skin: X 2 open wounds right foot and ankle  Neurological: Grossly intact but diminished sensation in toes.      Historical Information   Past Medical History:   Diagnosis Date    Anxiety     Arthritis     Cancer (HCC)     Chronic back pain     Depression     Diabetes mellitus (HCC)      Hypertension     Liver disease     Mitral valve prolapse     Peripheral neuropathy     Neuropathy    PONV (postoperative nausea and vomiting)     Thyroid disease      Past Surgical History:   Procedure Laterality Date    COLONOSCOPY      INCISION AND DRAINAGE OF WOUND Left 2022    Procedure: INCISION AND DRAINAGE (I&D) EXTREMITY;  Surgeon: Moustapha Cote DPM;  Location:  MAIN OR;  Service: Podiatry    IR BIOPSY SPINE  2024    IR BIOPSY SPINE  2024    IR PICC PLACEMENT SINGLE LUMEN  2024    JOINT REPLACEMENT Left 2022    Left TSA    CO ARTHRODESIS POSTERIOR/PSTLAT TQ 1NTRSPC LUMBAR Bilateral 2023    Procedure: L1-S1 navigated posterior decompression with instrumented fixation fusion;  Surgeon: James Moraes MD;  Location:  MAIN OR;  Service: Neurosurgery    THYROID SURGERY      remove cancer     Social History   Social History     Substance and Sexual Activity   Alcohol Use Never     Social History     Substance and Sexual Activity   Drug Use Yes    Frequency: 7.0 times per week    Types: Marijuana    Comment: Medical Marijuana, takes for pain, daily, vape     Social History     Tobacco Use   Smoking Status Former    Current packs/day: 0.00    Average packs/day: 0.3 packs/day for 5.9 years (1.5 ttl pk-yrs)    Types: Cigarettes    Start date: 1975    Quit date: 1981    Years since quittin.2   Smokeless Tobacco Never   Tobacco Comments    quit ; smokes when in pain as of 26     Family History:   Family History   Problem Relation Age of Onset    No Known Problems Father     No Known Problems Mother     Colon cancer Neg Hx        Meds/Allergies     Medications Prior to Admission:     acetaminophen (TYLENOL) 500 mg tablet    clonazePAM (KlonoPIN) 1 mg tablet    escitalopram (Lexapro) 20 mg tablet    finasteride (PROSCAR) 5 mg tablet    folic acid (FOLVITE) 1 mg tablet    GNP Aspirin Low Dose 81 MG EC tablet    hydrOXYzine pamoate (VISTARIL) 50 mg capsule    " Jardiance 10 MG TABS    ketoconazole (NIZORAL) 2 % shampoo    Lactobacillus Acid-Pectin (Acidophilus/Pectin) CAPS    lovastatin (MEVACOR) 20 mg tablet    metFORMIN (GLUCOPHAGE) 1000 MG tablet    methocarbamol (ROBAXIN) 750 mg tablet    metoprolol succinate (TOPROL-XL) 50 mg 24 hr tablet    mirtazapine (REMERON) 45 MG tablet    NIFEdipine (PROCARDIA XL) 30 mg 24 hr tablet    NIFEdipine (PROCARDIA XL) 90 mg 24 hr tablet    ondansetron (Zofran ODT) 4 mg disintegrating tablet    oxyCODONE (ROXICODONE) 5 immediate release tablet    patient supplied medication    senna (SENOKOT) 8.6 mg    sertraline (ZOLOFT) 100 mg tablet    Sodium Chloride Flush (Normal Saline Flush) 0.9 % SOLN    tamsulosin (FLOMAX) 0.4 mg    ACCU-CHEK FABIANO PLUS test strip    ACCU-CHEK FASTCLIX LANCETS MISC    Azelastine HCl 137 MCG/SPRAY SOLN    DAPTOMYCIN IV    doxycycline hyclate (VIBRAMYCIN) 100 mg capsule    fluticasone (FLONASE) 50 mcg/act nasal spray    furosemide (LASIX) 20 mg tablet    gabapentin (NEURONTIN) 100 mg capsule    lidocaine (LIDODERM) 5 %    loratadine (CLARITIN) 10 mg tablet    VANCOMYCIN HCL IV  Allergies   Allergen Reactions    Abilify [Aripiprazole] Tremor     Shaking      Cephalexin Diarrhea    Molds & Smuts Allergic Rhinitis    Pregabalin Tremor     Lyrica - shaking feeling       Objective   First Vitals:   Blood Pressure: (!) 189/92 (08/15/24 1359)  Pulse: 102 (08/15/24 1359)  Temperature: (!) 97.1 °F (36.2 °C) (08/15/24 1359)  Temp Source: Temporal (08/15/24 1359)  Respirations: 20 (08/15/24 1359)  Height: 5' 8\" (172.7 cm) (08/15/24 1359)  Weight - Scale: 80.3 kg (177 lb) (08/15/24 1359)  SpO2: 100 % (08/15/24 1359)    Current Vitals:   Blood Pressure: 150/74 (08/16/24 1614)  Pulse: 77 (08/16/24 0803)  Temperature: 97.7 °F (36.5 °C) (08/16/24 0803)  Temp Source: Oral (08/15/24 2236)  Respirations: 18 (08/16/24 0803)  Height: 5' 8\" (172.7 cm) (08/15/24 1900)  Weight - Scale: 75.1 kg (165 lb 9.1 oz) (08/15/24 1900)  SpO2: " "100 % (08/16/24 0803)        /74   Pulse 77   Temp 97.7 °F (36.5 °C)   Resp 18   Ht 5' 8\" (1.727 m)   Wt 75.1 kg (165 lb 9.1 oz)   SpO2 100%   BMI 25.17 kg/m²     General Appearance:    Alert, cooperative, no distress, resting comfortably in bed with family at bedside.   Head:    Normocephalic, without obvious abnormality, atraumatic   Eyes:    PERRL, conjunctiva/corneas clear, EOM's intact            Nose:   Moist mucous membranes, no drainage or sinus tenderness   Throat:   No tenderness, no exudates   Neck:   Supple, symmetrical, trachea midline, no JVD   Back:     Symmetric, no CVA tenderness   Lungs:     Respirations unlabored   Chest wall:    No tenderness or deformity   Heart:    Rate and rhythm noted on last vitals.    Abdomen:     Soft, non-tender, bowel sounds active all four quadrants,     no masses, no organomegaly       Lower Ext/Ortho: No gross deformities of the lower extremity noted.     Neuro/Vasc: CNII-XII intact. Normal strength  Sensation and reflexes: Grossly intact  Pulses: Right: DP 1/4, PT 1/4, Left: DP 1/4, PT 1/4  Capillary Filling: 3-4 Sec, Edema: +1 edema bilateral     Skin: Texture, Tone and Turgor: Diminished, Digital Hair: None  Atrophic Changes: Mild  Lesions: None  Nail Pathology: Multiple dystrophic nails consistent with mycosis.  Ulcers/Wounds: Full-thickness ulcerations noted right lower extremity anterior forefoot overlying the third MPJ measuring approximately 3 cm x 3 cm x 0.2 and a smaller 2 x 1 x 0.2 cm breakdown anterior right ankle.  No signs of clinical infection.  No wound dehiscence.                       Lab Results:   CBC w/diff  Results from last 7 days   Lab Units 08/16/24  0507   WBC Thousand/uL 10.68*   HEMOGLOBIN g/dL 10.7*   HEMATOCRIT % 34.1*   PLATELETS Thousands/uL 395*   SEGS PCT % 76*   LYMPHO PCT % 13*   MONO PCT % 10   EOS PCT % 0     BMP  Results from last 7 days   Lab Units 08/16/24  0507   POTASSIUM mmol/L 3.1*   CHLORIDE mmol/L 105   CO2 " mmol/L 28   BUN mg/dL 15   CREATININE mg/dL 0.70   CALCIUM mg/dL 8.8     CMP  Results from last 7 days   Lab Units 08/16/24  0507   POTASSIUM mmol/L 3.1*   CHLORIDE mmol/L 105   CO2 mmol/L 28   BUN mg/dL 15   CREATININE mg/dL 0.70   CALCIUM mg/dL 8.8   ALK PHOS U/L 68   ALT U/L 7   AST U/L 13     @Culture@  Lab Results   Component Value Date    BLOODCX No Growth at 24 hrs. 08/15/2024    BLOODCX No Growth at 24 hrs. 08/15/2024    BLOODCX No Growth After 5 Days. 06/27/2024    BLOODCX No Growth After 5 Days. 06/27/2024    BLOODCX No Growth After 5 Days. 05/18/2024    BLOODCX No Growth After 5 Days. 05/18/2024    BLOODCX No Growth After 5 Days. 01/24/2024    BLOODCX No Growth After 5 Days. 01/24/2024    BLOODCX No Growth After 5 Days. 10/09/2023    BLOODCX No Growth After 5 Days. 10/09/2023    BLOODCX No Growth After 5 Days. 04/19/2023    BLOODCX No Growth After 5 Days. 04/19/2023    BLOODCX No Growth After 5 Days. 08/09/2022    BLOODCX No Growth After 5 Days. 08/09/2022    URINECX 60,000-69,000 cfu/ml Candida glabrata (A) 07/13/2024    URINECX >100,000 cfu/ml Klebsiella pneumoniae (A) 10/10/2023    URINECX >100,000 cfu/ml Escherichia coli (A) 08/29/2023    URINECX 50,000-59,000 cfu/ml Escherichia coli (A) 06/29/2023    URINECX <10,000 cfu/ml Gram Negative Vahe (A) 06/29/2023    URINECX No Growth <1000 cfu/mL 04/20/2023     Lab Results   Component Value Date    WOUNDCULT No growth 02/01/2024         Imaging: I have personally reviewed pertinent films in PACS      EKG, Pathology, and Other Studies: I have personally reviewed pertinent reports.      Code Status: Level 1 - Full Code  Advance Directive and Living Will:      Power of :      Please Note: Voice to text dictation software was used in the creation of this document.       Moustapha Cote DPM

## 2024-08-17 NOTE — QUICK NOTE
No fever but WBC count is mildly elevated at 12.5K. Bld cx's are neg. UA did not show any pyuria. Pt completed long course of po Levaquin on 8/12 for tx of prostatitis with bilat prostate abscesses. MRI of prostate is pending to re-eval his prostatitis with prostatic abscesses. Pt finished 6 week course of Vanco which was changed to Dapto because of rising creatinine on 8/9. Not clear if his N/V PTA was due to Doxycycline that Pt started taking on 8/10 for chronic abx suppression of lumbar discitis/osteo in setting of hardware.    - Cont Vanco and Cefepime for now  - Will follow WBC count   - Awaiting results of bld cx's  - If bld cx's remain neg, planning to stop Vanco tomorrow and restart Doxycycline   - Will monitor for development of N/V when Doxycycline is restarted  - Cont Cefepime while MRI of prostate is pending

## 2024-08-17 NOTE — ASSESSMENT & PLAN NOTE
Acute on chronic back pain status post lumbar fusion  Follows with [facility]  Pain control with lidocaine, Tylenol, oxycodone, Dilaudid as needed

## 2024-08-17 NOTE — ASSESSMENT & PLAN NOTE
Severely elevated blood pressures into the 220s  Multifactorial as he did not take his home antihypertensive this morning and he is in severe back pain  Treat pain with analgesic regimen  Resume home regimen of Procardia 120 mg daily, Toprol-XL 50 mg twice daily  Monitor blood pressure trend closely

## 2024-08-17 NOTE — PROGRESS NOTES
[facility]  Progress Note  Name: Juan San I  MRN: [id number]  Unit/Bed#: -01 I Date of Admission: 8/15/2024   Date of Service: 8/17/2024 I Hospital Day: 2    Assessment & Plan   Type 2 diabetes mellitus with hyperglycemia, without long-term current use of insulin (HCC)  Assessment & Plan  Lab Results   Component Value Date    HGBA1C 7.9 (H) 05/19/2024       Recent Labs     08/16/24  0800 08/16/24  1150 08/16/24  1611 08/16/24 2052   POCGLU 131 173* 152* 114       Blood Sugar Average: Last 72 hrs:  (P) 148A1c 7.9  Hold metformin and start sliding scale while hospitalized      Anxiety and depression  Assessment & Plan  Resume home Lexapro, Atarax, Remeron    Acute on chronic bilateral low back pain with sciatica  Assessment & Plan  Acute on chronic back pain status post lumbar fusion  Follows with [facility]  Pain control with lidocaine, Tylenol, oxycodone, Dilaudid as needed    Status post lumbar spinal fusion  Assessment & Plan  Patient is status post lumbar spinal fusion at [facility]  He is on oral doxycycline suppressive therapy  History of infected hardware in back and follows with neurosurgery  Case was discussed with his primary neurosurgeon, zhanna to stay at [facility] for antibiotics, blood cultures and pain control.    Hypertensive urgency  Assessment & Plan  Severely elevated blood pressures into the 220s  Multifactorial as he did not take his home antihypertensive this morning and he is in severe back pain  Treat pain with analgesic regimen  Resume home regimen of Procardia 120 mg daily, Toprol-XL 50 mg twice daily  Monitor blood pressure trend closely    * Sepsis with acute organ dysfunction (HCC)  Assessment & Plan  Meeting sepsis criteria with tachycardia, tachypnea, leukocytosis  Possibly secondary to hardware infection and back versus bacteremia  Lactic acid is 4.7  Repeat lactic acid now  Cont  IV hydration  cont IV  cefepime and vancomycin   blood cultures neg thus far  ID consult - MRI prostate pending            VTE  Prophylaxis:   Pharmacologic: in place  Mechanical VTE Prophylaxis in Place: Yes    Patient Centered Rounds: I have performed bedside rounds with nursing staff today.    Discussions with Specialists or Other Care Team Provider: case management    Education and Discussions with Family / Patient: yes    Mobility:   Basic Mobility Inpatient Raw Score: 21  JH-HLM Goal: 6: Walk 10 steps or more  JH-HLM Achieved: 7: Walk 25 feet or more      Current Length of Stay: 2 day(s)    Current Patient Status: Inpatient        Code Status: Level 1 - Full Code    Discharge Plan: Pt will require continued inpatient hospitalization.    Subjective:   Pt denies fever  Chronic back issues present    Patient is seen and examined at bedside.  All other ROS are negative.    Objective:     Vitals:   Temp (24hrs), Av.7 °F (36.5 °C), Min:97.7 °F (36.5 °C), Max:97.7 °F (36.5 °C)    Temp:  [97.7 °F (36.5 °C)] 97.7 °F (36.5 °C)  HR:  [] 100  Resp:  [18] 18  BP: (145-150)/(69-94) 149/94  SpO2:  [98 %-100 %] 100 %  Body mass index is 25.17 kg/m².     Input and Output Summary (last 24 hours):       Intake/Output Summary (Last 24 hours) at 2024 0858  Last data filed at 2024 0701  Gross per 24 hour   Intake 1740 ml   Output 2420 ml   Net -680 ml       Physical Exam:       GEN: No acute distress, comfortable  HEEENT: No JVD, PERRLA, no scleral icterus  RESP: Lungs clear to auscultation bilaterally  CV: RRR, +s1/s2   ABD: SOFT NON TENDER, POSITIVE BOWEL SOUNDS, NO DISTENTION  PSYCH: CALM  NEURO: Mentation baseline, NO FOCAL DEFICITS  SKIN: NO RASH  EXTREM: NO EDEMA    Additional Data:     Labs:    Results from last 7 days   Lab Units 24  0344   WBC Thousand/uL 12.51*   HEMOGLOBIN g/dL 11.5*   HEMATOCRIT % 37.3   PLATELETS Thousands/uL 373   SEGS PCT % 72   LYMPHO PCT % 13*   MONO PCT % 8   EOS PCT % 6     Results from last 7  days   Lab Units 08/17/24  0344   SODIUM mmol/L 139   POTASSIUM mmol/L 3.1*   CHLORIDE mmol/L 101   CO2 mmol/L 26   BUN mg/dL 10   CREATININE mg/dL 0.67   ANION GAP mmol/L 12   CALCIUM mg/dL 9.2   ALBUMIN g/dL 4.5   TOTAL BILIRUBIN mg/dL 0.58   ALK PHOS U/L 80   ALT U/L 9   AST U/L 21   GLUCOSE RANDOM mg/dL 120     Results from last 7 days   Lab Units 08/15/24  1438   INR  0.99     Results from last 7 days   Lab Units 08/16/24  2052 08/16/24  1611 08/16/24  1150 08/16/24  0800 08/15/24  1942   POC GLUCOSE mg/dl 114 152* 173* 131 170*         Results from last 7 days   Lab Units 08/15/24  1839 08/15/24  1438   LACTIC ACID mmol/L 2.8* 4.7*   PROCALCITONIN ng/ml  --  0.07       Lines/Drains:  Invasive Devices       Peripherally Inserted Central Catheter Line  Duration             PICC Line 06/03/24 Right Brachial 75 days              Peripheral Intravenous Line  Duration             Peripheral IV 08/15/24 Distal;Left;Upper;Ventral (anterior) Arm 1 day    Peripheral IV 08/15/24 Right Antecubital 1 day                    Telemetry:        * I Have Reviewed All Lab Data Listed Above.           Imaging:     Results for orders placed during the hospital encounter of 08/15/24    XR chest 1 view portable    Narrative  XR CHEST PORTABLE    INDICATION: vomiting.    COMPARISON: Chest radiograph July 13, 2024    FINDINGS:    Clear lungs. No pneumothorax or pleural effusion.    Normal cardiomediastinal silhouette.    Partially imaged left shoulder arthroplasty. Partially imaged thoracolumbar spinal hardware. No acute osseous abnormality.    Normal upper abdomen.    Impression  No acute cardiopulmonary disease.        Workstation performed: [id number]    Results for orders placed during the hospital encounter of 06/27/24    XR chest pa & lateral    Narrative  XR CHEST PA & LATERAL    INDICATION: reports chills.    COMPARISON: Chest radiograph 10/9/2023.    FINDINGS:    No consolidation or edema. Scattered right lung calcified  granulomas. No pneumothorax or pleural effusion.    Normal cardiomediastinal silhouette.    Partially visualized left shoulder arthroplasty. Partially visualized lumbar spine fusion.    Normal upper abdomen.    Impression  No acute cardiopulmonary disease.        Workstation performed: [id number]      *I have reviewed all imaging reports listed above      Recent Cultures (last 7 days):     Results from last 7 days   Lab Units 08/15/24  1443 08/15/24  1438   BLOOD CULTURE  No Growth at 24 hrs. No Growth at 24 hrs.       Last 24 Hours Medication List:   Current Facility-Administered Medications   Medication Dose Route Frequency Provider Last Rate    acetaminophen  650 mg Oral Q6H PRN Silvio Barrios PA-C      aspirin  81 mg Oral Daily Silvio Barrios PA-C      cefepime  2,000 mg Intravenous Q8H Julia Quiroz MD 2,000 mg (08/17/24 0824)    clonazePAM  1 mg Oral BID Silvio Barrios PA-C      escitalopram  20 mg Oral Daily Silvio Brarios PA-C      finasteride  5 mg Oral Daily Silvio Barrios PA-C      gabapentin  100 mg Oral Daily Silvio Barrios PA-C      HYDROmorphone  0.5 mg Intravenous Q2H PRN Silvio Barrios PA-C      hydrOXYzine HCL  50 mg Oral BID PRN Silvio Barrios PA-C      insulin lispro  1-6 Units Subcutaneous TID AC Silvio Barrios PA-C      insulin lispro  1-6 Units Subcutaneous HS Silvio Barrios PA-C      lactobacillus acidophilus-bulgaricus  1 packet Oral TID With Meals Silvio Barrios PA-C      lidocaine  1 patch Topical Daily Silvio Barrios PA-C      methocarbamol  750 mg Oral Q6H PRN Silvio Barrios PA-C      metoprolol succinate  50 mg Oral BID Silvio Barrios PA-C      mirtazapine  45 mg Oral HS Silvio Barrios PA-C      NIFEdipine  30 mg Oral Daily Silvio Barrios PA-C      NIFEdipine  90 mg Oral QAM Silvio Barrios PA-C      ondansetron  4 mg Intravenous Q6H PRN Silvio Barrios PA-C      oxyCODONE  5 mg Oral Q4H PRN Silvio Barrios PA-C      Or    oxyCODONE  10 mg Oral Q4H PRN  Silvio Barrios PA-C      polyethylene glycol  17 g Oral Daily PRN Silvio Barrios PA-C      pravastatin  20 mg Oral Daily With Dinner Silvio Barrios PA-C      senna  8.6 mg Oral BID Silvio Barriso PA-C      sodium chloride  125 mL/hr Intravenous Continuous Silvio Barrios PA-C 125 mL/hr (08/17/24 0029)    tamsulosin  0.4 mg Oral Daily With Dinner Silvio Barrios PA-C      vancomycin  1,000 mg Intravenous Q12H Silvio Barrios PA-C 1,000 mg (08/17/24 0352)        Today, Patient Was Seen By: Harinder Marin MD    ** Please Note: Dictation voice to text software may have been used in the creation of this document. **

## 2024-08-17 NOTE — ASSESSMENT & PLAN NOTE
Meeting sepsis criteria with tachycardia, tachypnea, leukocytosis  Possibly secondary to hardware infection and back versus bacteremia  Lactic acid is 4.7  Repeat lactic acid now  Cont  IV hydration  cont IV cefepime and vancomycin   blood cultures neg thus far  ID consult - MRI prostate pending

## 2024-08-17 NOTE — ASSESSMENT & PLAN NOTE
Lab Results   Component Value Date    HGBA1C 7.9 (H) 05/19/2024       Recent Labs     08/16/24  0800 08/16/24  1150 08/16/24  1611 08/16/24 2052   POCGLU 131 173* 152* 114       Blood Sugar Average: Last 72 hrs:  (P) 148A1c 7.9  Hold metformin and start sliding scale while hospitalized

## 2024-08-17 NOTE — PROGRESS NOTES
Juan San is a 69 y.o. male who is currently ordered Vancomycin IV with management by the Pharmacy Consult service.  Relevant clinical data and objective / subjective history reviewed.  Vancomycin Assessment:  Indication and Goal AUC/Trough: Bacteremia (goal -600, trough >10), -600, trough >10  Clinical Status: stable  Micro:     Renal Function:  SCr: 0.67 mg/dL  CrCl: >100 mL/min  Renal replacement: Not on dialysis  Days of Therapy: 3  Current Dose: 1000 mg Q12h  Vancomycin Plan:  New Dosing: no change  Estimated AUC: 485 mcg*hr/mL  Estimated Trough: 13.2 mcg/mL  Next Level: 0600 on 8/24  Renal Function Monitoring: Daily BMP and UOP  Pharmacy will continue to follow closely for s/sx of nephrotoxicity, infusion reactions and appropriateness of therapy.  BMP and CBC will be ordered per protocol. We will continue to follow the patient’s culture results and clinical progress daily.    Freddy Avitia, Pharmacist

## 2024-08-17 NOTE — ASSESSMENT & PLAN NOTE
Patient is status post lumbar spinal fusion at [facility]  He is on oral doxycycline suppressive therapy  History of infected hardware in back and follows with neurosurgery  Case was discussed with his primary neurosurgeon, zhanna to stay at [facility] for antibiotics, blood cultures and pain control.

## 2024-08-18 LAB
ALBUMIN SERPL BCG-MCNC: 4 G/DL (ref 3.5–5)
ALP SERPL-CCNC: 72 U/L (ref 34–104)
ALT SERPL W P-5'-P-CCNC: 10 U/L (ref 7–52)
ANION GAP SERPL CALCULATED.3IONS-SCNC: 10 MMOL/L (ref 4–13)
AST SERPL W P-5'-P-CCNC: 22 U/L (ref 13–39)
BASOPHILS # BLD AUTO: 0.07 THOUSANDS/ÂΜL (ref 0–0.1)
BASOPHILS NFR BLD AUTO: 1 % (ref 0–1)
BILIRUB SERPL-MCNC: 0.44 MG/DL (ref 0.2–1)
BUN SERPL-MCNC: 10 MG/DL (ref 5–25)
CALCIUM SERPL-MCNC: 8.6 MG/DL (ref 8.4–10.2)
CHLORIDE SERPL-SCNC: 103 MMOL/L (ref 96–108)
CO2 SERPL-SCNC: 25 MMOL/L (ref 21–32)
CREAT SERPL-MCNC: 0.69 MG/DL (ref 0.6–1.3)
EOSINOPHIL # BLD AUTO: 0.53 THOUSAND/ÂΜL (ref 0–0.61)
EOSINOPHIL NFR BLD AUTO: 5 % (ref 0–6)
ERYTHROCYTE [DISTWIDTH] IN BLOOD BY AUTOMATED COUNT: 18.4 % (ref 11.6–15.1)
GFR SERPL CREATININE-BSD FRML MDRD: 98 ML/MIN/1.73SQ M
GLUCOSE SERPL-MCNC: 100 MG/DL (ref 65–140)
GLUCOSE SERPL-MCNC: 200 MG/DL (ref 65–140)
GLUCOSE SERPL-MCNC: 85 MG/DL (ref 65–140)
GLUCOSE SERPL-MCNC: 94 MG/DL (ref 65–140)
HCT VFR BLD AUTO: 34.2 % (ref 36.5–49.3)
HGB BLD-MCNC: 10.5 G/DL (ref 12–17)
IMM GRANULOCYTES # BLD AUTO: 0.06 THOUSAND/UL (ref 0–0.2)
IMM GRANULOCYTES NFR BLD AUTO: 1 % (ref 0–2)
LYMPHOCYTES # BLD AUTO: 1.68 THOUSANDS/ÂΜL (ref 0.6–4.47)
LYMPHOCYTES NFR BLD AUTO: 14 % (ref 14–44)
MCH RBC QN AUTO: 24.6 PG (ref 26.8–34.3)
MCHC RBC AUTO-ENTMCNC: 30.7 G/DL (ref 31.4–37.4)
MCV RBC AUTO: 80 FL (ref 82–98)
MONOCYTES # BLD AUTO: 0.98 THOUSAND/ÂΜL (ref 0.17–1.22)
MONOCYTES NFR BLD AUTO: 8 % (ref 4–12)
NEUTROPHILS # BLD AUTO: 8.54 THOUSANDS/ÂΜL (ref 1.85–7.62)
NEUTS SEG NFR BLD AUTO: 71 % (ref 43–75)
NRBC BLD AUTO-RTO: 0 /100 WBCS
PLATELET # BLD AUTO: 327 THOUSANDS/UL (ref 149–390)
PMV BLD AUTO: 9.7 FL (ref 8.9–12.7)
POTASSIUM SERPL-SCNC: 3.3 MMOL/L (ref 3.5–5.3)
PROT SERPL-MCNC: 6.9 G/DL (ref 6.4–8.4)
RBC # BLD AUTO: 4.26 MILLION/UL (ref 3.88–5.62)
SODIUM SERPL-SCNC: 138 MMOL/L (ref 135–147)
WBC # BLD AUTO: 11.86 THOUSAND/UL (ref 4.31–10.16)

## 2024-08-18 PROCEDURE — 80053 COMPREHEN METABOLIC PANEL: CPT | Performed by: HOSPITALIST

## 2024-08-18 PROCEDURE — 82948 REAGENT STRIP/BLOOD GLUCOSE: CPT

## 2024-08-18 PROCEDURE — 85025 COMPLETE CBC W/AUTO DIFF WBC: CPT | Performed by: HOSPITALIST

## 2024-08-18 PROCEDURE — 99232 SBSQ HOSP IP/OBS MODERATE 35: CPT | Performed by: HOSPITALIST

## 2024-08-18 RX ORDER — DOXYCYCLINE 100 MG/1
100 CAPSULE ORAL EVERY 12 HOURS SCHEDULED
Status: DISCONTINUED | OUTPATIENT
Start: 2024-08-18 | End: 2024-08-20 | Stop reason: HOSPADM

## 2024-08-18 RX ORDER — POTASSIUM CHLORIDE 1500 MG/1
40 TABLET, EXTENDED RELEASE ORAL ONCE
Status: COMPLETED | OUTPATIENT
Start: 2024-08-18 | End: 2024-08-18

## 2024-08-18 RX ADMIN — OXYCODONE HYDROCHLORIDE 10 MG: 10 TABLET ORAL at 19:58

## 2024-08-18 RX ADMIN — MIRTAZAPINE 45 MG: 15 TABLET, FILM COATED ORAL at 21:09

## 2024-08-18 RX ADMIN — METOPROLOL SUCCINATE 50 MG: 50 TABLET, EXTENDED RELEASE ORAL at 08:13

## 2024-08-18 RX ADMIN — INSULIN LISPRO 2 UNITS: 100 INJECTION, SOLUTION INTRAVENOUS; SUBCUTANEOUS at 11:11

## 2024-08-18 RX ADMIN — ESCITALOPRAM OXALATE 20 MG: 20 TABLET ORAL at 08:13

## 2024-08-18 RX ADMIN — DOXYCYCLINE 100 MG: 100 CAPSULE ORAL at 21:09

## 2024-08-18 RX ADMIN — HYDROMORPHONE HYDROCHLORIDE 0.5 MG: 1 INJECTION, SOLUTION INTRAMUSCULAR; INTRAVENOUS; SUBCUTANEOUS at 22:05

## 2024-08-18 RX ADMIN — LACTOBACILLUS ACIDOPHILUS / LACTOBACILLUS BULGARICUS 1 PACKET: 100 MILLION CFU STRENGTH GRANULES at 08:12

## 2024-08-18 RX ADMIN — NIFEDIPINE 30 MG: 30 TABLET, EXTENDED RELEASE ORAL at 08:13

## 2024-08-18 RX ADMIN — HYDROMORPHONE HYDROCHLORIDE 0.5 MG: 1 INJECTION, SOLUTION INTRAMUSCULAR; INTRAVENOUS; SUBCUTANEOUS at 06:30

## 2024-08-18 RX ADMIN — ONDANSETRON 4 MG: 2 INJECTION, SOLUTION INTRAMUSCULAR; INTRAVENOUS at 23:37

## 2024-08-18 RX ADMIN — PRAVASTATIN SODIUM 20 MG: 20 TABLET ORAL at 15:48

## 2024-08-18 RX ADMIN — NIFEDIPINE 90 MG: 30 TABLET, EXTENDED RELEASE ORAL at 08:13

## 2024-08-18 RX ADMIN — HYDROMORPHONE HYDROCHLORIDE 0.5 MG: 1 INJECTION, SOLUTION INTRAMUSCULAR; INTRAVENOUS; SUBCUTANEOUS at 08:45

## 2024-08-18 RX ADMIN — LIDOCAINE 1 PATCH: 50 PATCH CUTANEOUS at 08:12

## 2024-08-18 RX ADMIN — LACTOBACILLUS ACIDOPHILUS / LACTOBACILLUS BULGARICUS 1 PACKET: 100 MILLION CFU STRENGTH GRANULES at 11:36

## 2024-08-18 RX ADMIN — CEFEPIME HYDROCHLORIDE 2000 MG: 2 INJECTION, SOLUTION INTRAVENOUS at 15:48

## 2024-08-18 RX ADMIN — FINASTERIDE 5 MG: 5 TABLET, FILM COATED ORAL at 08:12

## 2024-08-18 RX ADMIN — CLONAZEPAM 1 MG: 1 TABLET ORAL at 08:13

## 2024-08-18 RX ADMIN — GABAPENTIN 100 MG: 100 CAPSULE ORAL at 08:13

## 2024-08-18 RX ADMIN — SENNOSIDES 8.6 MG: 8.6 TABLET, FILM COATED ORAL at 17:05

## 2024-08-18 RX ADMIN — TAMSULOSIN HYDROCHLORIDE 0.4 MG: 0.4 CAPSULE ORAL at 15:48

## 2024-08-18 RX ADMIN — HYDROMORPHONE HYDROCHLORIDE 0.5 MG: 1 INJECTION, SOLUTION INTRAMUSCULAR; INTRAVENOUS; SUBCUTANEOUS at 01:52

## 2024-08-18 RX ADMIN — CEFEPIME HYDROCHLORIDE 2000 MG: 2 INJECTION, SOLUTION INTRAVENOUS at 23:37

## 2024-08-18 RX ADMIN — LACTOBACILLUS ACIDOPHILUS / LACTOBACILLUS BULGARICUS 1 PACKET: 100 MILLION CFU STRENGTH GRANULES at 15:48

## 2024-08-18 RX ADMIN — OXYCODONE HYDROCHLORIDE 10 MG: 10 TABLET ORAL at 08:12

## 2024-08-18 RX ADMIN — SENNOSIDES 8.6 MG: 8.6 TABLET, FILM COATED ORAL at 08:13

## 2024-08-18 RX ADMIN — OXYCODONE HYDROCHLORIDE 10 MG: 10 TABLET ORAL at 15:48

## 2024-08-18 RX ADMIN — METOPROLOL SUCCINATE 50 MG: 50 TABLET, EXTENDED RELEASE ORAL at 21:09

## 2024-08-18 RX ADMIN — VANCOMYCIN HYDROCHLORIDE 1000 MG: 1 INJECTION, SOLUTION INTRAVENOUS at 06:31

## 2024-08-18 RX ADMIN — HYDROMORPHONE HYDROCHLORIDE 0.5 MG: 1 INJECTION, SOLUTION INTRAMUSCULAR; INTRAVENOUS; SUBCUTANEOUS at 11:10

## 2024-08-18 RX ADMIN — DOXYCYCLINE 100 MG: 100 CAPSULE ORAL at 11:36

## 2024-08-18 RX ADMIN — SODIUM CHLORIDE 125 ML/HR: 0.9 INJECTION, SOLUTION INTRAVENOUS at 06:31

## 2024-08-18 RX ADMIN — OXYCODONE HYDROCHLORIDE 10 MG: 10 TABLET ORAL at 04:10

## 2024-08-18 RX ADMIN — POTASSIUM CHLORIDE 40 MEQ: 1500 TABLET, EXTENDED RELEASE ORAL at 08:14

## 2024-08-18 RX ADMIN — ASPIRIN 81 MG: 81 TABLET, COATED ORAL at 08:13

## 2024-08-18 RX ADMIN — CLONAZEPAM 1 MG: 1 TABLET ORAL at 17:05

## 2024-08-18 RX ADMIN — CEFEPIME HYDROCHLORIDE 2000 MG: 2 INJECTION, SOLUTION INTRAVENOUS at 08:12

## 2024-08-18 NOTE — ASSESSMENT & PLAN NOTE
Patient is status post lumbar spinal fusion at [facility]  He is on oral doxycycline suppressive therapy  History of infected hardware in back and follows with neurosurgery  Case was discussed with his primary neurosurgeon, zhanna to stay at [place] [facility] for antibiotics, blood cultures and pain control.

## 2024-08-18 NOTE — ASSESSMENT & PLAN NOTE
Acute on chronic back pain status post lumbar fusion  Follows with [facility]  Pain control with lidocaine, Tylenol, oxycodone, Dilaudid as needed

## 2024-08-18 NOTE — QUICK NOTE
No fever but WBC count conts to be mildly elevated. MRI of prostate was done today and results are pending. Bld cx's are neg.     - Will stop Vanco  - Will restart Doxycycline for chronic abx suppression of his lumbar discitis with presence of hardware as per his ID physician, Dr. Miles, at [facility]  - Cont Cefepime for now while results of MRI of prostate are still pending  - Will monitor for the development of N/V with restart of po Doxycycline

## 2024-08-18 NOTE — PROGRESS NOTES
Juan San is a 69 y.o. male who is currently ordered Vancomycin IV with management by the Pharmacy Consult service.  Relevant clinical data and objective / subjective history reviewed.  Vancomycin Assessment:  Indication and Goal AUC/Trough: Bacteremia (goal -600, trough >10), -600, trough >10  Clinical Status:  stable  Micro:     Renal Function:  SCr: 0.69 mg/dL  CrCl: 100.5 mL/min  Renal replacement: Not on dialysis  Days of Therapy: 4  Current Dose: 1000 mg Q12h  Vancomycin Plan:  New Dosing: no change  Estimated AUC: 499 mcg*hr/mL  Estimated Trough: 13.8 mcg/mL  Next Level: 0400 on 08/24/24  Renal Function Monitoring: Daily BMP and UOP  Pharmacy will continue to follow closely for s/sx of nephrotoxicity, infusion reactions and appropriateness of therapy.  BMP and CBC will be ordered per protocol. We will continue to follow the patient’s culture results and clinical progress daily.    Hiren Fleming, Pharmacist

## 2024-08-18 NOTE — QUICK NOTE
The patient’s vancomycin therapy has been completed / discontinued. Thank you for allowing us to take part in this patient's care. Pharmacy will sign-off now; please call or re-consult if there are any questions.       Erik RossD

## 2024-08-18 NOTE — ASSESSMENT & PLAN NOTE
Lab Results   Component Value Date    HGBA1C 7.9 (H) 05/19/2024       Recent Labs     08/16/24 2052 08/17/24  1212 08/17/24  1532 08/17/24 2055   POCGLU 114 188* 145* 151*         Blood Sugar Average: Last 72 hrs:  (P) 153A1c 7.9  Hold metformin and start sliding scale while hospitalized

## 2024-08-19 LAB
ALBUMIN SERPL BCG-MCNC: 4.2 G/DL (ref 3.5–5)
ALP SERPL-CCNC: 86 U/L (ref 34–104)
ALT SERPL W P-5'-P-CCNC: 13 U/L (ref 7–52)
ANION GAP SERPL CALCULATED.3IONS-SCNC: 16 MMOL/L (ref 4–13)
AST SERPL W P-5'-P-CCNC: 20 U/L (ref 13–39)
BASOPHILS # BLD AUTO: 0.03 THOUSANDS/ÂΜL (ref 0–0.1)
BASOPHILS NFR BLD AUTO: 0 % (ref 0–1)
BILIRUB SERPL-MCNC: 0.56 MG/DL (ref 0.2–1)
BUN SERPL-MCNC: 9 MG/DL (ref 5–25)
CALCIUM SERPL-MCNC: 9.2 MG/DL (ref 8.4–10.2)
CHLORIDE SERPL-SCNC: 98 MMOL/L (ref 96–108)
CO2 SERPL-SCNC: 23 MMOL/L (ref 21–32)
CREAT SERPL-MCNC: 0.71 MG/DL (ref 0.6–1.3)
EOSINOPHIL # BLD AUTO: 0.03 THOUSAND/ÂΜL (ref 0–0.61)
EOSINOPHIL NFR BLD AUTO: 0 % (ref 0–6)
ERYTHROCYTE [DISTWIDTH] IN BLOOD BY AUTOMATED COUNT: 17.9 % (ref 11.6–15.1)
GFR SERPL CREATININE-BSD FRML MDRD: 97 ML/MIN/1.73SQ M
GLUCOSE SERPL-MCNC: 101 MG/DL (ref 65–140)
GLUCOSE SERPL-MCNC: 107 MG/DL (ref 65–140)
GLUCOSE SERPL-MCNC: 139 MG/DL (ref 65–140)
GLUCOSE SERPL-MCNC: 193 MG/DL (ref 65–140)
GLUCOSE SERPL-MCNC: 202 MG/DL (ref 65–140)
HCT VFR BLD AUTO: 39.4 % (ref 36.5–49.3)
HGB BLD-MCNC: 12.2 G/DL (ref 12–17)
IMM GRANULOCYTES # BLD AUTO: 0.04 THOUSAND/UL (ref 0–0.2)
IMM GRANULOCYTES NFR BLD AUTO: 0 % (ref 0–2)
LYMPHOCYTES # BLD AUTO: 0.67 THOUSANDS/ÂΜL (ref 0.6–4.47)
LYMPHOCYTES NFR BLD AUTO: 5 % (ref 14–44)
MCH RBC QN AUTO: 24.9 PG (ref 26.8–34.3)
MCHC RBC AUTO-ENTMCNC: 31 G/DL (ref 31.4–37.4)
MCV RBC AUTO: 80 FL (ref 82–98)
MONOCYTES # BLD AUTO: 0.45 THOUSAND/ÂΜL (ref 0.17–1.22)
MONOCYTES NFR BLD AUTO: 3 % (ref 4–12)
NEUTROPHILS # BLD AUTO: 13.22 THOUSANDS/ÂΜL (ref 1.85–7.62)
NEUTS SEG NFR BLD AUTO: 92 % (ref 43–75)
NRBC BLD AUTO-RTO: 0 /100 WBCS
PLATELET # BLD AUTO: 372 THOUSANDS/UL (ref 149–390)
PMV BLD AUTO: 10.2 FL (ref 8.9–12.7)
POTASSIUM SERPL-SCNC: 3 MMOL/L (ref 3.5–5.3)
PROT SERPL-MCNC: 8.1 G/DL (ref 6.4–8.4)
RBC # BLD AUTO: 4.9 MILLION/UL (ref 3.88–5.62)
SODIUM SERPL-SCNC: 137 MMOL/L (ref 135–147)
WBC # BLD AUTO: 14.44 THOUSAND/UL (ref 4.31–10.16)

## 2024-08-19 PROCEDURE — 99223 1ST HOSP IP/OBS HIGH 75: CPT | Performed by: PHYSICIAN ASSISTANT

## 2024-08-19 PROCEDURE — 82948 REAGENT STRIP/BLOOD GLUCOSE: CPT

## 2024-08-19 PROCEDURE — 99233 SBSQ HOSP IP/OBS HIGH 50: CPT | Performed by: STUDENT IN AN ORGANIZED HEALTH CARE EDUCATION/TRAINING PROGRAM

## 2024-08-19 PROCEDURE — 85025 COMPLETE CBC W/AUTO DIFF WBC: CPT | Performed by: HOSPITALIST

## 2024-08-19 PROCEDURE — 80053 COMPREHEN METABOLIC PANEL: CPT | Performed by: HOSPITALIST

## 2024-08-19 RX ORDER — METOCLOPRAMIDE HYDROCHLORIDE 5 MG/ML
10 INJECTION INTRAMUSCULAR; INTRAVENOUS EVERY 6 HOURS PRN
Status: DISCONTINUED | OUTPATIENT
Start: 2024-08-19 | End: 2024-08-19

## 2024-08-19 RX ORDER — ONDANSETRON 2 MG/ML
4 INJECTION INTRAMUSCULAR; INTRAVENOUS EVERY 4 HOURS PRN
Status: DISCONTINUED | OUTPATIENT
Start: 2024-08-19 | End: 2024-08-20 | Stop reason: HOSPADM

## 2024-08-19 RX ORDER — NIFEDIPINE 30 MG/1
60 TABLET, EXTENDED RELEASE ORAL 2 TIMES DAILY
Status: DISCONTINUED | OUTPATIENT
Start: 2024-08-20 | End: 2024-08-20 | Stop reason: HOSPADM

## 2024-08-19 RX ORDER — METOCLOPRAMIDE HYDROCHLORIDE 5 MG/ML
10 INJECTION INTRAMUSCULAR; INTRAVENOUS EVERY 8 HOURS
Status: DISCONTINUED | OUTPATIENT
Start: 2024-08-19 | End: 2024-08-20 | Stop reason: HOSPADM

## 2024-08-19 RX ORDER — CLONAZEPAM 1 MG/1
1 TABLET ORAL ONCE
Status: COMPLETED | OUTPATIENT
Start: 2024-08-19 | End: 2024-08-19

## 2024-08-19 RX ORDER — POTASSIUM CHLORIDE 1500 MG/1
40 TABLET, EXTENDED RELEASE ORAL
Status: COMPLETED | OUTPATIENT
Start: 2024-08-19 | End: 2024-08-19

## 2024-08-19 RX ORDER — MAGNESIUM SULFATE HEPTAHYDRATE 40 MG/ML
2 INJECTION, SOLUTION INTRAVENOUS ONCE
Status: COMPLETED | OUTPATIENT
Start: 2024-08-19 | End: 2024-08-19

## 2024-08-19 RX ORDER — LISINOPRIL 5 MG/1
5 TABLET ORAL DAILY
Status: DISCONTINUED | OUTPATIENT
Start: 2024-08-19 | End: 2024-08-20 | Stop reason: HOSPADM

## 2024-08-19 RX ORDER — ENOXAPARIN SODIUM 100 MG/ML
30 INJECTION SUBCUTANEOUS
Status: DISCONTINUED | OUTPATIENT
Start: 2024-08-19 | End: 2024-08-20 | Stop reason: HOSPADM

## 2024-08-19 RX ADMIN — LACTOBACILLUS ACIDOPHILUS / LACTOBACILLUS BULGARICUS 1 PACKET: 100 MILLION CFU STRENGTH GRANULES at 16:58

## 2024-08-19 RX ADMIN — METOPROLOL SUCCINATE 50 MG: 50 TABLET, EXTENDED RELEASE ORAL at 07:52

## 2024-08-19 RX ADMIN — NIFEDIPINE 30 MG: 30 TABLET, EXTENDED RELEASE ORAL at 07:52

## 2024-08-19 RX ADMIN — POTASSIUM CHLORIDE 40 MEQ: 1500 TABLET, EXTENDED RELEASE ORAL at 20:58

## 2024-08-19 RX ADMIN — OXYCODONE HYDROCHLORIDE 10 MG: 10 TABLET ORAL at 08:03

## 2024-08-19 RX ADMIN — OXYCODONE HYDROCHLORIDE 10 MG: 10 TABLET ORAL at 16:50

## 2024-08-19 RX ADMIN — POTASSIUM CHLORIDE 40 MEQ: 1500 TABLET, EXTENDED RELEASE ORAL at 16:50

## 2024-08-19 RX ADMIN — POTASSIUM CHLORIDE 40 MEQ: 1500 TABLET, EXTENDED RELEASE ORAL at 10:49

## 2024-08-19 RX ADMIN — LACTOBACILLUS ACIDOPHILUS / LACTOBACILLUS BULGARICUS 1 PACKET: 100 MILLION CFU STRENGTH GRANULES at 08:03

## 2024-08-19 RX ADMIN — TRIMETHOBENZAMIDE HYDROCHLORIDE 200 MG: 100 INJECTION INTRAMUSCULAR at 02:49

## 2024-08-19 RX ADMIN — CLONAZEPAM 1 MG: 1 TABLET ORAL at 07:52

## 2024-08-19 RX ADMIN — HYDROMORPHONE HYDROCHLORIDE 0.5 MG: 1 INJECTION, SOLUTION INTRAMUSCULAR; INTRAVENOUS; SUBCUTANEOUS at 19:58

## 2024-08-19 RX ADMIN — INSULIN LISPRO 1 UNITS: 100 INJECTION, SOLUTION INTRAVENOUS; SUBCUTANEOUS at 07:50

## 2024-08-19 RX ADMIN — SENNOSIDES 8.6 MG: 8.6 TABLET, FILM COATED ORAL at 17:55

## 2024-08-19 RX ADMIN — GABAPENTIN 100 MG: 100 CAPSULE ORAL at 07:51

## 2024-08-19 RX ADMIN — MAGNESIUM SULFATE HEPTAHYDRATE 2 G: 2 INJECTION, SOLUTION INTRAVENOUS at 10:48

## 2024-08-19 RX ADMIN — OXYCODONE HYDROCHLORIDE 10 MG: 10 TABLET ORAL at 12:37

## 2024-08-19 RX ADMIN — FINASTERIDE 5 MG: 5 TABLET, FILM COATED ORAL at 07:52

## 2024-08-19 RX ADMIN — NIFEDIPINE 90 MG: 30 TABLET, EXTENDED RELEASE ORAL at 07:54

## 2024-08-19 RX ADMIN — LIDOCAINE 1 PATCH: 50 PATCH CUTANEOUS at 07:55

## 2024-08-19 RX ADMIN — METOCLOPRAMIDE 10 MG: 5 INJECTION, SOLUTION INTRAMUSCULAR; INTRAVENOUS at 19:58

## 2024-08-19 RX ADMIN — METOCLOPRAMIDE 10 MG: 5 INJECTION, SOLUTION INTRAMUSCULAR; INTRAVENOUS at 12:37

## 2024-08-19 RX ADMIN — CLONAZEPAM 1 MG: 1 TABLET ORAL at 17:52

## 2024-08-19 RX ADMIN — ENOXAPARIN SODIUM 30 MG: 30 INJECTION SUBCUTANEOUS at 12:01

## 2024-08-19 RX ADMIN — MIRTAZAPINE 45 MG: 15 TABLET, FILM COATED ORAL at 20:58

## 2024-08-19 RX ADMIN — TAMSULOSIN HYDROCHLORIDE 0.4 MG: 0.4 CAPSULE ORAL at 16:50

## 2024-08-19 RX ADMIN — LACTOBACILLUS ACIDOPHILUS / LACTOBACILLUS BULGARICUS 1 PACKET: 100 MILLION CFU STRENGTH GRANULES at 12:01

## 2024-08-19 RX ADMIN — SODIUM CHLORIDE 125 ML/HR: 0.9 INJECTION, SOLUTION INTRAVENOUS at 01:41

## 2024-08-19 RX ADMIN — SENNOSIDES 8.6 MG: 8.6 TABLET, FILM COATED ORAL at 07:52

## 2024-08-19 RX ADMIN — OXYCODONE HYDROCHLORIDE 10 MG: 10 TABLET ORAL at 20:58

## 2024-08-19 RX ADMIN — DOXYCYCLINE 100 MG: 100 CAPSULE ORAL at 07:52

## 2024-08-19 RX ADMIN — PRAVASTATIN SODIUM 20 MG: 20 TABLET ORAL at 16:50

## 2024-08-19 RX ADMIN — CLONAZEPAM 1 MG: 1 TABLET ORAL at 23:26

## 2024-08-19 RX ADMIN — ESCITALOPRAM OXALATE 20 MG: 20 TABLET ORAL at 07:52

## 2024-08-19 RX ADMIN — DOXYCYCLINE 100 MG: 100 CAPSULE ORAL at 19:58

## 2024-08-19 RX ADMIN — LISINOPRIL 5 MG: 5 TABLET ORAL at 10:49

## 2024-08-19 RX ADMIN — CEFEPIME HYDROCHLORIDE 2000 MG: 2 INJECTION, SOLUTION INTRAVENOUS at 16:50

## 2024-08-19 RX ADMIN — METOCLOPRAMIDE 10 MG: 5 INJECTION, SOLUTION INTRAMUSCULAR; INTRAVENOUS at 00:31

## 2024-08-19 RX ADMIN — CEFEPIME HYDROCHLORIDE 2000 MG: 2 INJECTION, SOLUTION INTRAVENOUS at 22:45

## 2024-08-19 RX ADMIN — ASPIRIN 81 MG: 81 TABLET, COATED ORAL at 07:51

## 2024-08-19 RX ADMIN — METOCLOPRAMIDE 10 MG: 5 INJECTION, SOLUTION INTRAMUSCULAR; INTRAVENOUS at 06:15

## 2024-08-19 RX ADMIN — CEFEPIME HYDROCHLORIDE 2000 MG: 2 INJECTION, SOLUTION INTRAVENOUS at 07:51

## 2024-08-19 RX ADMIN — SODIUM CHLORIDE 125 ML/HR: 0.9 INJECTION, SOLUTION INTRAVENOUS at 10:49

## 2024-08-19 RX ADMIN — METOPROLOL SUCCINATE 50 MG: 50 TABLET, EXTENDED RELEASE ORAL at 19:58

## 2024-08-19 NOTE — ASSESSMENT & PLAN NOTE
Acute on chronic back pain status post lumbar fusion  Follows with [facility]  Pain control with lidocaine, Tylenol, oxycodone, Dilaudid as needed

## 2024-08-19 NOTE — ASSESSMENT & PLAN NOTE
Patient is status post lumbar spinal fusion at [facility]  He is on oral doxycycline suppressive therapy  History of infected hardware in back and follows with neurosurgery  Case was discussed with his primary neurosurgeon, zhanna to stay at [facility] for antibiotics, blood cultures and pain control.

## 2024-08-19 NOTE — ASSESSMENT & PLAN NOTE
Patient is status post lumbar spinal fusion at [facility] [address]  He is on oral doxycycline suppressive therapy  History of infected hardware in back and follows with neurosurgery  Case was discussed with his primary neurosurgeon, zhanna to stay at [facility] for antibiotics, blood cultures and pain control.

## 2024-08-19 NOTE — ASSESSMENT & PLAN NOTE
Severely elevated blood pressures into the 220s  Multifactorial as he did not take his home antihypertensive this morning and he is in severe back pain  Treat pain with analgesic regimen  Changed Procardia 60 bid   Toprol-XL 50 mg twice daily  Added lisinopril 5 mg qd  Measure BP q4h  Monitor blood pressure trend closely

## 2024-08-19 NOTE — ASSESSMENT & PLAN NOTE
Lab Results   Component Value Date    HGBA1C 7.9 (H) 05/19/2024       Recent Labs     08/19/24  1617 08/19/24  2047 08/20/24  0743 08/20/24  1039   POCGLU 101 107 130 217*       Blood Sugar Average: Last 72 hrs:  (P) 145.1930203689769905B1i 7.9  Hold metformin and start sliding scale while hospitalized

## 2024-08-19 NOTE — PLAN OF CARE
Problem: Potential for Falls  Goal: Patient will remain free of falls  Description: INTERVENTIONS:  - Educate patient/family on patient safety including physical limitations  - Instruct patient to call for assistance with activity   - Consult OT/PT to assist with strengthening/mobility   - Keep Call bell within reach  - Keep bed low and locked with side rails adjusted as appropriate  - Keep care items and personal belongings within reach  - Initiate and maintain comfort rounds  - Make Fall Risk Sign visible to staff  - Offer Toileting every 2 Hours, in advance of need  - Initiate/Maintain bed alarm  - Obtain necessary fall risk management equipment: socks  - Apply yellow socks and bracelet for high fall risk patients  - Consider moving patient to room near nurses station  Outcome: Progressing     Problem: PAIN - ADULT  Goal: Verbalizes/displays adequate comfort level or baseline comfort level  Description: Interventions:  - Encourage patient to monitor pain and request assistance  - Assess pain using appropriate pain scale  - Administer analgesics based on type and severity of pain and evaluate response  - Implement non-pharmacological measures as appropriate and evaluate response  - Consider cultural and social influences on pain and pain management  - Notify physician/advanced practitioner if interventions unsuccessful or patient reports new pain  Outcome: Progressing     Problem: INFECTION - ADULT  Goal: Absence or prevention of progression during hospitalization  Description: INTERVENTIONS:  - Assess and monitor for signs and symptoms of infection  - Monitor lab/diagnostic results  - Monitor all insertion sites, i.e. indwelling lines, tubes, and drains  - Administer medications as ordered  - Instruct and encourage patient and family to use good hand hygiene technique  - Identify and instruct in appropriate isolation precautions for identified infection/condition  Outcome: Progressing     Problem: SAFETY  ADULT  Goal: Patient will remain free of falls  Description: INTERVENTIONS:  - Educate patient/family on patient safety including physical limitations  - Instruct patient to call for assistance with activity   - Consult OT/PT to assist with strengthening/mobility   - Keep Call bell within reach  - Keep bed low and locked with side rails adjusted as appropriate  - Keep care items and personal belongings within reach  - Initiate and maintain comfort rounds  - Make Fall Risk Sign visible to staff  - Offer Toileting every 2 Hours, in advance of need  - Initiate/Maintain bed alarm  - Obtain necessary fall risk management equipment: socks  - Apply yellow socks and bracelet for high fall risk patients  - Consider moving patient to room near nurses station  Outcome: Progressing

## 2024-08-19 NOTE — PROGRESS NOTES
[facility]  Progress Note  Name: Juan San I  MRN: [id number]  Unit/Bed#: -01 I Date of Admission: 8/15/2024   Date of Service: 8/19/2024 I Hospital Day: 4    Assessment & Plan   * Sepsis with acute organ dysfunction (HCC)  Assessment & Plan  Meeting sepsis criteria with tachycardia, tachypnea, leukocytosis  Possibly secondary to hardware infection and back versus bacteremia  Lactic acid cleared  Cont  IV hydration  cont IV cefepime day 4  Continue Doxy   blood cultures NGTD  MRI prostate 1.  The previously described peripherally enhancing lesions within the transitional zone of the prostate gland appear minimally improved from the prior MRI, but overall minimally changed since 7/1/2024. While these are concerning for abscesses, given the overall lack of change, this may not represent the source of sepsis. 2.  Abnormal fluid signal and productive changes at the L5-S1 level of the lumbar spine, consistent with discitis osteomyelitis. Correlation with lumbar spine MRI is recommended.   Leukocytosis worsening  Discussed with ID    Acute on chronic bilateral low back pain with sciatica  Assessment & Plan  Acute on chronic back pain status post lumbar fusion  Follows with [facility]  Pain control with lidocaine, Tylenol, oxycodone, Dilaudid as needed    Hypertensive urgency  Assessment & Plan  Severely elevated blood pressures into the 220s  Multifactorial as he did not take his home antihypertensive this morning and he is in severe back pain  Treat pain with analgesic regimen  Changed Procardia 60 bid   Toprol-XL 50 mg twice daily  Added lisinopril 5 mg qd  Measure BP q4h  Monitor blood pressure trend closely    Status post lumbar spinal fusion  Assessment & Plan  Patient is status post lumbar spinal fusion at [facility]  He is on oral doxycycline suppressive therapy  History of infected hardware in back and follows with neurosurgery  Case was  discussed with his primary neurosurgeon, zhanna to stay at [facility] for antibiotics, blood cultures and pain control.    Type 2 diabetes mellitus with hyperglycemia, without long-term current use of insulin ([facility])  Assessment & Plan  Lab Results   Component Value Date    HGBA1C 7.9 (H) 05/19/2024       Recent Labs     08/18/24  1031 08/18/24  1600 08/18/24  2046 08/19/24  0655   POCGLU 200* 85 94 193*       Blood Sugar Average: Last 72 hrs:  (P) 147.5940967410044869A8m 7.9  Hold metformin and start sliding scale while hospitalized      Anxiety and depression  Assessment & Plan  Resume home Lexapro, Atarax, Remeron               VTE Pharmacologic Prophylaxis: VTE Score: 2 Moderate Risk (Score 3-4) - Pharmacological DVT Prophylaxis Ordered: enoxaparin (Lovenox).    Mobility:   Basic Mobility Inpatient Raw Score: 21  JH-HLM Goal: 6: Walk 10 steps or more  JH-HLM Achieved: 6: Walk 10 steps or more  JH-HLM Goal achieved. Continue to encourage appropriate mobility.    Patient Centered Rounds: I performed bedside rounds with nursing staff today.   Discussions with Specialists or Other Care Team Provider: YANN SOLARES    Education and Discussions with Family / Patient: Patient declined call to .     Total Time Spent on Date of Encounter in care of patient: 58 mins. This time was spent on one or more of the following: performing physical exam; counseling and coordination of care; obtaining or reviewing history; documenting in the medical record; reviewing/ordering tests, medications or procedures; communicating with other healthcare professionals and discussing with patient's family/caregivers.    Current Length of Stay: 4 day(s)  Current Patient Status: Inpatient   Certification Statement: The patient will continue to require additional inpatient hospital stay due to sepsis  Discharge Plan: Anticipate discharge in >72 hrs to home.    Code Status: Level 1 - Full Code    Subjective:   Juan seen and examined  at bedside.  No acute events overnight.  Discussed plan of care.  All questions and concerns were answered and addressed.  Continues to have some back pain however overall much improved.  MRI showing possible osteo awaiting for further recommendations from ID as discussed with them earlier.  Will continue p.o. Doxy and cefepime. Will dc IVF    Objective:     Vitals:   Temp (24hrs), Av.6 °F (36.4 °C), Min:97.3 °F (36.3 °C), Max:97.9 °F (36.6 °C)    Temp:  [97.3 °F (36.3 °C)-97.9 °F (36.6 °C)] 97.9 °F (36.6 °C)  HR:  [] 105  Resp:  [16] 16  BP: (121-178)/(64-96) 178/96  SpO2:  [97 %-99 %] 98 %  Body mass index is 25.17 kg/m².     Input and Output Summary (last 24 hours):     Intake/Output Summary (Last 24 hours) at 2024 1044  Last data filed at 2024 0823  Gross per 24 hour   Intake 850 ml   Output 1320 ml   Net -470 ml       Physical Exam:   Physical Exam  Vitals and nursing note reviewed.   Constitutional:       General: He is not in acute distress.     Appearance: He is not ill-appearing.   HENT:      Head: Normocephalic and atraumatic.   Cardiovascular:      Rate and Rhythm: Normal rate and regular rhythm.      Pulses: Normal pulses.      Heart sounds: Normal heart sounds.   Pulmonary:      Effort: Pulmonary effort is normal.      Breath sounds: Normal breath sounds.   Abdominal:      General: Abdomen is flat. Bowel sounds are normal.      Palpations: Abdomen is soft.   Musculoskeletal:      Right lower leg: No edema.      Left lower leg: No edema.   Skin:     General: Skin is warm.   Neurological:      General: No focal deficit present.      Mental Status: He is alert and oriented to person, place, and time.          Additional Data:     Labs:  Results from last 7 days   Lab Units 24  0321   WBC Thousand/uL 14.44*   HEMOGLOBIN g/dL 12.2   HEMATOCRIT % 39.4   PLATELETS Thousands/uL 372   SEGS PCT % 92*   LYMPHO PCT % 5*   MONO PCT % 3*   EOS PCT % 0     Results from last 7 days   Lab Units  08/19/24  0321   SODIUM mmol/L 137   POTASSIUM mmol/L 3.0*   CHLORIDE mmol/L 98   CO2 mmol/L 23   BUN mg/dL 9   CREATININE mg/dL 0.71   ANION GAP mmol/L 16*   CALCIUM mg/dL 9.2   ALBUMIN g/dL 4.2   TOTAL BILIRUBIN mg/dL 0.56   ALK PHOS U/L 86   ALT U/L 13   AST U/L 20   GLUCOSE RANDOM mg/dL 202*     Results from last 7 days   Lab Units 08/15/24  1438   INR  0.99     Results from last 7 days   Lab Units 08/19/24  0655 08/18/24  2046 08/18/24  1600 08/18/24  1031 08/17/24  2055 08/17/24  1532 08/17/24  1212 08/16/24  2052 08/16/24  1611 08/16/24  1150 08/16/24  0800 08/15/24  1942   POC GLUCOSE mg/dl 193* 94 85 200* 151* 145* 188* 114 152* 173* 131 170*         Results from last 7 days   Lab Units 08/15/24  1839 08/15/24  1438   LACTIC ACID mmol/L 2.8* 4.7*   PROCALCITONIN ng/ml  --  0.07       Lines/Drains:  Invasive Devices       Peripherally Inserted Central Catheter Line  Duration             PICC Line 06/03/24 Right Brachial 77 days              Peripheral Intravenous Line  Duration             Peripheral IV 08/15/24 Right Antecubital 3 days    Peripheral IV 08/17/24 Left;Ventral (anterior) Forearm 2 days                    Central Line:  Goal for removal: N/A - Chronic PICC             Imaging: Reviewed radiology reports from this admission including: MRI spine    Recent Cultures (last 7 days):   Results from last 7 days   Lab Units 08/15/24  1443 08/15/24  1438   BLOOD CULTURE  No Growth at 72 hrs. No Growth at 72 hrs.       Last 24 Hours Medication List:   Current Facility-Administered Medications   Medication Dose Route Frequency Provider Last Rate    acetaminophen  650 mg Oral Q6H PRN Silvio Barrios PA-C      aspirin  81 mg Oral Daily Silvio Barrios PA-C      cefepime  2,000 mg Intravenous Q8H Julia Quiroz MD 2,000 mg (08/19/24 0751)    clonazePAM  1 mg Oral BID Silvio Barrios PA-C      doxycycline hyclate  100 mg Oral Q12H [facility] Julia Quiroz MD      escitalopram  20 mg Oral Daily Silvio Barrios PA-C       finasteride  5 mg Oral Daily Silvio Barrios PA-C      gabapentin  100 mg Oral Daily Silvio Barrios PA-C      HYDROmorphone  0.5 mg Intravenous Q2H PRN Silvio Barrios PA-C      hydrOXYzine HCL  50 mg Oral BID PRN Silvio Barrios PA-C      insulin lispro  1-6 Units Subcutaneous TID AC Silvio Barrios PA-C      insulin lispro  1-6 Units Subcutaneous HS Silvio Barrios PA-C      lactobacillus acidophilus-bulgaricus  1 packet Oral TID With Meals Silvio Barrios PA-C      lidocaine  1 patch Topical Daily Silvio Barrios PA-C      lisinopril  5 mg Oral Daily Shweta Shannon MD      magnesium sulfate  2 g Intravenous Once Shweta Shannon MD      methocarbamol  750 mg Oral Q6H PRN Silvio Barrios PA-C      metoclopramide  10 mg Intravenous Q6H PRN Sailaja Salcedo PA-C      metoprolol succinate  50 mg Oral BID Silvio Barrios PA-C      mirtazapine  45 mg Oral HS Silvio Barrios PA-C      NIFEdipine  60 mg Oral BID Shweta Shannon MD      oxyCODONE  5 mg Oral Q4H PRN Silvio Barrios PA-C      Or    oxyCODONE  10 mg Oral Q4H PRN Silvio Barrios PA-C      polyethylene glycol  17 g Oral Daily PRN Silvio Barrios PA-C      potassium chloride  40 mEq Oral TID With Meals Shweta Shannon MD      pravastatin  20 mg Oral Daily With Dinner Silvio Barrios PA-C      senna  8.6 mg Oral BID Silvio Barrios PA-C      sodium chloride  125 mL/hr Intravenous Continuous Silvio Barrios PA-C 125 mL/hr (08/19/24 0141)    tamsulosin  0.4 mg Oral Daily With Dinner Silvio Barrios PA-C      trimethobenzamide  200 mg Intramuscular Q6H PRN Sailaja Salcedo PA-C          Today, Patient Was Seen By: Shweta Shannon MD    **Please Note: This note may have been constructed using a voice recognition system.**

## 2024-08-19 NOTE — NURSING NOTE
Pt having unrelieved nausea. RN gave zofran, reglan, and tigan. Pt still having unrelieved nausea and asking for antiemetics. RN attempted to educate pt on trying to eat food with oral pills and abx. Pt has not eaten in two days and is still taking oral pain meds along with oral abx. RN tried to encourage pt to eat crackers or applesauce or drink fluids to help coat the stomach. Pt stating he cannot eat anything. Provider made aware of pts unrelieved nausea and poor appetite with oral meds/abx. RN will continue to monitor and will give antiemetics when due to help relieve nausea.

## 2024-08-19 NOTE — ASSESSMENT & PLAN NOTE
Meeting sepsis criteria with tachycardia, tachypnea, leukocytosis  Possibly secondary to hardware infection and back versus bacteremia  Lactic acid cleared  Dc'd IV hydration  Completed IV cefepime day 5  Continue Doxy 100 bid  blood cultures NGTD  MRI prostate 1.  The previously described peripherally enhancing lesions within the transitional zone of the prostate gland appear minimally improved from the prior MRI, but overall minimally changed since 7/1/2024. While these are concerning for abscesses, given the overall lack of change, this may not represent the source of sepsis. 2.  Abnormal fluid signal and productive changes at the L5-S1 level of the lumbar spine, consistent with discitis osteomyelitis. Correlation with lumbar spine MRI is recommended.   Leukocytosis improved    Pt to f/u with PCP, ID, neurosurgery and urology outpt-> continue doxy 100 bid and will dc'd with Levaquin 750 qd for 15 more days

## 2024-08-19 NOTE — ASSESSMENT & PLAN NOTE
Acute on chronic back pain status post lumbar fusion  Follows with [facility]  Pain control with lidocaine, Tylenol, oxycodone, Dilaudid as needed

## 2024-08-19 NOTE — ASSESSMENT & PLAN NOTE
Meeting sepsis criteria with tachycardia, tachypnea, leukocytosis  Possibly secondary to hardware infection and back versus bacteremia  Lactic acid cleared  Cont  IV hydration  cont IV cefepime day 4  Continue Doxy   blood cultures NGTD  MRI prostate 1.  The previously described peripherally enhancing lesions within the transitional zone of the prostate gland appear minimally improved from the prior MRI, but overall minimally changed since 7/1/2024. While these are concerning for abscesses, given the overall lack of change, this may not represent the source of sepsis. 2.  Abnormal fluid signal and productive changes at the L5-S1 level of the lumbar spine, consistent with discitis osteomyelitis. Correlation with lumbar spine MRI is recommended.   Leukocytosis worsening  Discussed with ID

## 2024-08-19 NOTE — ASSESSMENT & PLAN NOTE
Lab Results   Component Value Date    HGBA1C 7.9 (H) 05/19/2024       Recent Labs     08/18/24  1031 08/18/24  1600 08/18/24 2046 08/19/24  0655   POCGLU 200* 85 94 193*       Blood Sugar Average: Last 72 hrs:  (P) 147.7452358098206983K1p 7.9  Hold metformin and start sliding scale while hospitalized

## 2024-08-19 NOTE — CONSULTS
Consultation - Juan San 69 y.o. male MRN: [id number]    Unit/Bed#: -01 Encounter: [id number]      Assessment & Plan     Assessment:  Prostate abscesses, sepsis POA  Pt is 70 y/o male with pmh for DM, h/o ETOH abuse, ETOH cirrhosis, HLD, chronic back pain s/p fusion and recent lami for eval of abscess (6/14/24) sent home with PICC and abx,Thyroid ca, BPH and HTN who presents to the hospital for acute on chronic back pain.     MRI pelvis:   1.  The previously described peripherally enhancing lesions within the transitional zone of the prostate gland appear minimally improved from the prior MRI, but overall minimally changed since 7/1/2024. While these are concerning for abscesses, given the overall lack of change, this may not represent the source of sepsis.  2.  Abnormal fluid signal and productive changes at the L5-S1 level of the lumbar spine, consistent with discitis osteomyelitis. Correlation with lumbar spine MRI is recommended.    VSS, afeb  WBC 14.44/11.86  Hgb 12.2/10.5  Creat 0.71/0.69    Plan:  MRI reviewed by Dr Willingham  Plan to monitor for now  No urologic intervention at this time  Prostate abscess vs discitis with osteo as possible sources  ID on board      History of Present Illness     HPI: Juan San is a 69 y.o. year old male with pmh for DM, h/o ETOH abuse, ETOH cirrhosis, HLD, chronic back pain s/p fusion and recent lami for eval of abscess (6/14/24) sent home with PICC and abx,Thyroid ca, BPH and HTN who presents to the hospital for acute on chronic back pain. He does report difficulty initiating a stream when urinating. He also reports that he does not feel like he empties completely. He denies any abd pain. His pain is mostly lower back and he states it runs down both buttocks and into his legs. He denies fevers but, reports occasional chills. He denies any change in his bowels.     Inpatient consult to Urology  Consult performed by: Diandra Steele PA-C  Consult ordered by:  Shweta Shannon MD          Review of Systems   Constitutional:  Positive for chills. Negative for fever.   HENT:  Negative for ear pain and sore throat.    Eyes:  Negative for pain and visual disturbance.   Respiratory:  Negative for cough and shortness of breath.    Cardiovascular:  Negative for chest pain and palpitations.   Gastrointestinal:  Negative for abdominal pain and vomiting.   Genitourinary:  Negative for dysuria and hematuria.        Reports some difficulty initiating his stream and does not feel he empties completely.    Musculoskeletal:  Positive for back pain. Negative for arthralgias.   Skin:  Negative for color change and rash.   Neurological:  Negative for seizures and syncope.   All other systems reviewed and are negative.      Historical Information   Past Medical History:   Diagnosis Date    Anxiety     Arthritis     Cancer (HCC)     Chronic back pain     Depression     Diabetes mellitus (HCC)     Hypertension     Liver disease     Mitral valve prolapse     Peripheral neuropathy     Neuropathy    PONV (postoperative nausea and vomiting)     Thyroid disease      Past Surgical History:   Procedure Laterality Date    COLONOSCOPY      INCISION AND DRAINAGE OF WOUND Left 08/12/2022    Procedure: INCISION AND DRAINAGE (I&D) EXTREMITY;  Surgeon: Moustapha Coet DPM;  Location:  MAIN OR;  Service: Podiatry    IR BIOPSY SPINE  1/30/2024    IR BIOPSY SPINE  2/1/2024    IR PICC PLACEMENT SINGLE LUMEN  2/6/2024    JOINT REPLACEMENT Left 03/11/2022    Left TSA    [place] ARTHRODESIS POSTERIOR/PSTLAT TQ 1NTRSPC LUMBAR Bilateral 04/11/2023    Procedure: L1-S1 navigated posterior decompression with instrumented fixation fusion;  Surgeon: James Moraes MD;  Location:  MAIN OR;  Service: Neurosurgery    THYROID SURGERY  2021    remove cancer     Social History   Social History     Substance and Sexual Activity   Alcohol Use Never     Social History     Substance and Sexual Activity   Drug Use Yes    Frequency:  "7.0 times per week    Types: Marijuana    Comment: Medical Marijuana, takes for pain, daily, vape     Social History     Tobacco Use   Smoking Status Former    Current packs/day: 0.00    Average packs/day: 0.3 packs/day for 5.9 years (1.5 ttl pk-yrs)    Types: Cigarettes    Start date: 1975    Quit date: 1981    Years since quittin.2   Smokeless Tobacco Never   Tobacco Comments    quit ; smokes when in pain as of 26     E-Cigarette/Vaping    E-Cigarette Use Former User     Comments medical marijuana - occ      E-Cigarette/Vaping Substances    Nicotine No     THC Yes     CBD Yes     Flavoring No     Other No     Unknown No       Family History: non-contributory    Meds/Allergies   all current active meds have been reviewed  Allergies   Allergen Reactions    Abilify [Aripiprazole] Tremor     Shaking      Cephalexin Diarrhea    Molds & Smuts Allergic Rhinitis    Pregabalin Tremor     Lyrica - shaking feeling       Objective   Vitals: Blood pressure 151/78, pulse 66, temperature 97.5 °F (36.4 °C), temperature source Temporal, resp. rate 16, height 5' 8\" (1.727 m), weight 75.1 kg (165 lb 9.1 oz), SpO2 97%.    Intake/Output Summary (Last 24 hours) at 2024 1621  Last data filed at 2024 1049  Gross per 24 hour   Intake 1425 ml   Output 1320 ml   Net 105 ml     Invasive Devices       Peripheral Intravenous Line  Duration             Peripheral IV 24 Left;Ventral (anterior) Forearm 2 days                    Physical Exam  Vitals and nursing note reviewed.   Constitutional:       General: He is not in acute distress.     Appearance: He is well-developed.   HENT:      Head: Normocephalic and atraumatic.   Eyes:      Conjunctiva/sclera: Conjunctivae normal.   Cardiovascular:      Rate and Rhythm: Normal rate and regular rhythm.      Heart sounds: No murmur heard.  Pulmonary:      Effort: Pulmonary effort is normal. No respiratory distress.      Breath sounds: Normal breath sounds. "   Abdominal:      General: Abdomen is flat. Bowel sounds are normal. There is no distension.      Palpations: Abdomen is soft.      Tenderness: There is no abdominal tenderness.   Musculoskeletal:         General: No swelling.      Cervical back: Neck supple.   Skin:     General: Skin is warm and dry.      Capillary Refill: Capillary refill takes less than 2 seconds.   Neurological:      General: No focal deficit present.      Mental Status: He is alert and oriented to person, place, and time.   Psychiatric:         Mood and Affect: Mood normal.         Behavior: Behavior normal.         Lab Results: I have personally reviewed pertinent reports.    Imaging Studies: I have personally reviewed pertinent reports.    EKG, Pathology, and Other Studies: I have personally reviewed pertinent reports.    VTE Prophylaxis: Enoxaparin (Lovenox)    Code Status: Level 1 - Full Code  Advance Directive and Living Will:      Power of :    POLST:      Counseling / Coordination of Care  Total floor / unit time spent today 30 minutes. Greater than 50% of total time was spent with the patient and / or family counseling and / or coordination of care. A description of the counseling / coordination of care: in my consultation note.

## 2024-08-20 VITALS
HEART RATE: 65 BPM | WEIGHT: 165.57 LBS | OXYGEN SATURATION: 94 % | TEMPERATURE: 97.3 F | RESPIRATION RATE: 18 BRPM | DIASTOLIC BLOOD PRESSURE: 73 MMHG | HEIGHT: 68 IN | SYSTOLIC BLOOD PRESSURE: 149 MMHG | BODY MASS INDEX: 25.09 KG/M2

## 2024-08-20 LAB
ANION GAP SERPL CALCULATED.3IONS-SCNC: 6 MMOL/L (ref 4–13)
BACTERIA BLD CULT: NORMAL
BACTERIA BLD CULT: NORMAL
BASOPHILS # BLD AUTO: 0.08 THOUSANDS/ÂΜL (ref 0–0.1)
BASOPHILS NFR BLD AUTO: 1 % (ref 0–1)
BUN SERPL-MCNC: 15 MG/DL (ref 5–25)
CALCIUM SERPL-MCNC: 9 MG/DL (ref 8.4–10.2)
CHLORIDE SERPL-SCNC: 104 MMOL/L (ref 96–108)
CO2 SERPL-SCNC: 26 MMOL/L (ref 21–32)
CREAT SERPL-MCNC: 0.81 MG/DL (ref 0.6–1.3)
EOSINOPHIL # BLD AUTO: 0.39 THOUSAND/ÂΜL (ref 0–0.61)
EOSINOPHIL NFR BLD AUTO: 4 % (ref 0–6)
ERYTHROCYTE [DISTWIDTH] IN BLOOD BY AUTOMATED COUNT: 18.2 % (ref 11.6–15.1)
GFR SERPL CREATININE-BSD FRML MDRD: 91 ML/MIN/1.73SQ M
GLUCOSE SERPL-MCNC: 130 MG/DL (ref 65–140)
GLUCOSE SERPL-MCNC: 155 MG/DL (ref 65–140)
GLUCOSE SERPL-MCNC: 217 MG/DL (ref 65–140)
HCT VFR BLD AUTO: 33.6 % (ref 36.5–49.3)
HGB BLD-MCNC: 10.5 G/DL (ref 12–17)
IMM GRANULOCYTES # BLD AUTO: 0.05 THOUSAND/UL (ref 0–0.2)
IMM GRANULOCYTES NFR BLD AUTO: 1 % (ref 0–2)
LYMPHOCYTES # BLD AUTO: 2.39 THOUSANDS/ÂΜL (ref 0.6–4.47)
LYMPHOCYTES NFR BLD AUTO: 23 % (ref 14–44)
MAGNESIUM SERPL-MCNC: 1.7 MG/DL (ref 1.9–2.7)
MCH RBC QN AUTO: 25.4 PG (ref 26.8–34.3)
MCHC RBC AUTO-ENTMCNC: 31.3 G/DL (ref 31.4–37.4)
MCV RBC AUTO: 81 FL (ref 82–98)
MONOCYTES # BLD AUTO: 1.04 THOUSAND/ÂΜL (ref 0.17–1.22)
MONOCYTES NFR BLD AUTO: 10 % (ref 4–12)
NEUTROPHILS # BLD AUTO: 6.37 THOUSANDS/ÂΜL (ref 1.85–7.62)
NEUTS SEG NFR BLD AUTO: 61 % (ref 43–75)
NRBC BLD AUTO-RTO: 0 /100 WBCS
PLATELET # BLD AUTO: 316 THOUSANDS/UL (ref 149–390)
PMV BLD AUTO: 10.3 FL (ref 8.9–12.7)
POTASSIUM SERPL-SCNC: 3.9 MMOL/L (ref 3.5–5.3)
RBC # BLD AUTO: 4.14 MILLION/UL (ref 3.88–5.62)
SODIUM SERPL-SCNC: 136 MMOL/L (ref 135–147)
WBC # BLD AUTO: 10.32 THOUSAND/UL (ref 4.31–10.16)

## 2024-08-20 PROCEDURE — 82948 REAGENT STRIP/BLOOD GLUCOSE: CPT

## 2024-08-20 PROCEDURE — 83735 ASSAY OF MAGNESIUM: CPT | Performed by: STUDENT IN AN ORGANIZED HEALTH CARE EDUCATION/TRAINING PROGRAM

## 2024-08-20 PROCEDURE — 80048 BASIC METABOLIC PNL TOTAL CA: CPT | Performed by: STUDENT IN AN ORGANIZED HEALTH CARE EDUCATION/TRAINING PROGRAM

## 2024-08-20 PROCEDURE — 99232 SBSQ HOSP IP/OBS MODERATE 35: CPT | Performed by: UROLOGY

## 2024-08-20 PROCEDURE — 85025 COMPLETE CBC W/AUTO DIFF WBC: CPT | Performed by: STUDENT IN AN ORGANIZED HEALTH CARE EDUCATION/TRAINING PROGRAM

## 2024-08-20 PROCEDURE — 99239 HOSP IP/OBS DSCHRG MGMT >30: CPT | Performed by: STUDENT IN AN ORGANIZED HEALTH CARE EDUCATION/TRAINING PROGRAM

## 2024-08-20 RX ORDER — NIFEDIPINE 30 MG/1
60 TABLET, EXTENDED RELEASE ORAL 2 TIMES DAILY
Qty: 120 TABLET | Refills: 0 | Status: SHIPPED | OUTPATIENT
Start: 2024-08-20 | End: 2024-09-19

## 2024-08-20 RX ORDER — LEVOFLOXACIN 750 MG/1
750 TABLET, FILM COATED ORAL EVERY 24 HOURS
Qty: 15 TABLET | Refills: 0 | Status: SHIPPED | OUTPATIENT
Start: 2024-08-20 | End: 2024-09-04

## 2024-08-20 RX ORDER — LISINOPRIL 5 MG/1
5 TABLET ORAL DAILY
Qty: 30 TABLET | Refills: 0 | Status: SHIPPED | OUTPATIENT
Start: 2024-08-21 | End: 2024-09-20

## 2024-08-20 RX ORDER — MAGNESIUM SULFATE HEPTAHYDRATE 40 MG/ML
2 INJECTION, SOLUTION INTRAVENOUS ONCE
Status: COMPLETED | OUTPATIENT
Start: 2024-08-20 | End: 2024-08-20

## 2024-08-20 RX ORDER — OXYCODONE HYDROCHLORIDE 5 MG/1
5 TABLET ORAL EVERY 6 HOURS PRN
Qty: 15 TABLET | Refills: 0 | Status: SHIPPED | OUTPATIENT
Start: 2024-08-20

## 2024-08-20 RX ADMIN — LIDOCAINE 1 PATCH: 50 PATCH CUTANEOUS at 07:49

## 2024-08-20 RX ADMIN — DOXYCYCLINE 100 MG: 100 CAPSULE ORAL at 08:04

## 2024-08-20 RX ADMIN — ENOXAPARIN SODIUM 30 MG: 30 INJECTION SUBCUTANEOUS at 07:49

## 2024-08-20 RX ADMIN — INSULIN LISPRO 2 UNITS: 100 INJECTION, SOLUTION INTRAVENOUS; SUBCUTANEOUS at 10:56

## 2024-08-20 RX ADMIN — METOCLOPRAMIDE 10 MG: 5 INJECTION, SOLUTION INTRAMUSCULAR; INTRAVENOUS at 11:45

## 2024-08-20 RX ADMIN — FINASTERIDE 5 MG: 5 TABLET, FILM COATED ORAL at 07:49

## 2024-08-20 RX ADMIN — SENNOSIDES 8.6 MG: 8.6 TABLET, FILM COATED ORAL at 07:49

## 2024-08-20 RX ADMIN — CLONAZEPAM 1 MG: 1 TABLET ORAL at 07:49

## 2024-08-20 RX ADMIN — MAGNESIUM SULFATE HEPTAHYDRATE 2 G: 2 INJECTION, SOLUTION INTRAVENOUS at 08:50

## 2024-08-20 RX ADMIN — ESCITALOPRAM OXALATE 20 MG: 20 TABLET ORAL at 07:49

## 2024-08-20 RX ADMIN — OXYCODONE HYDROCHLORIDE 10 MG: 10 TABLET ORAL at 12:32

## 2024-08-20 RX ADMIN — HYDROMORPHONE HYDROCHLORIDE 0.5 MG: 1 INJECTION, SOLUTION INTRAMUSCULAR; INTRAVENOUS; SUBCUTANEOUS at 04:29

## 2024-08-20 RX ADMIN — OXYCODONE HYDROCHLORIDE 10 MG: 10 TABLET ORAL at 07:46

## 2024-08-20 RX ADMIN — LACTOBACILLUS ACIDOPHILUS / LACTOBACILLUS BULGARICUS 1 PACKET: 100 MILLION CFU STRENGTH GRANULES at 11:45

## 2024-08-20 RX ADMIN — LACTOBACILLUS ACIDOPHILUS / LACTOBACILLUS BULGARICUS 1 PACKET: 100 MILLION CFU STRENGTH GRANULES at 07:46

## 2024-08-20 RX ADMIN — GABAPENTIN 100 MG: 100 CAPSULE ORAL at 07:49

## 2024-08-20 RX ADMIN — HYDROMORPHONE HYDROCHLORIDE 0.5 MG: 1 INJECTION, SOLUTION INTRAMUSCULAR; INTRAVENOUS; SUBCUTANEOUS at 00:40

## 2024-08-20 RX ADMIN — CEFEPIME HYDROCHLORIDE 2000 MG: 2 INJECTION, SOLUTION INTRAVENOUS at 07:46

## 2024-08-20 RX ADMIN — ASPIRIN 81 MG: 81 TABLET, COATED ORAL at 07:49

## 2024-08-20 RX ADMIN — METOCLOPRAMIDE 10 MG: 5 INJECTION, SOLUTION INTRAMUSCULAR; INTRAVENOUS at 03:07

## 2024-08-20 RX ADMIN — NIFEDIPINE 60 MG: 30 TABLET, EXTENDED RELEASE ORAL at 07:48

## 2024-08-20 RX ADMIN — METOPROLOL SUCCINATE 50 MG: 50 TABLET, EXTENDED RELEASE ORAL at 07:48

## 2024-08-20 RX ADMIN — OXYCODONE HYDROCHLORIDE 10 MG: 10 TABLET ORAL at 03:07

## 2024-08-20 RX ADMIN — LISINOPRIL 5 MG: 5 TABLET ORAL at 07:49

## 2024-08-20 NOTE — PLAN OF CARE
Problem: Potential for Falls  Goal: Patient will remain free of falls  Description: INTERVENTIONS:  - Educate patient/family on patient safety including physical limitations  - Instruct patient to call for assistance with activity   - Consult OT/PT to assist with strengthening/mobility   - Keep Call bell within reach  - Keep bed low and locked with side rails adjusted as appropriate  - Keep care items and personal belongings within reach  - Initiate and maintain comfort rounds  - Make Fall Risk Sign visible to staff  - Offer Toileting every 2 Hours, in advance of need  - Initiate/Maintain bed alarm  - Obtain necessary fall risk management equipment: socks  - Apply yellow socks and bracelet for high fall risk patients  - Consider moving patient to room near nurses station  Outcome: Progressing     Problem: PAIN - ADULT  Goal: Verbalizes/displays adequate comfort level or baseline comfort level  Description: Interventions:  - Encourage patient to monitor pain and request assistance  - Assess pain using appropriate pain scale  - Administer analgesics based on type and severity of pain and evaluate response  - Implement non-pharmacological measures as appropriate and evaluate response  - Consider cultural and social influences on pain and pain management  - Notify physician/advanced practitioner if interventions unsuccessful or patient reports new pain  Outcome: Progressing     Problem: INFECTION - ADULT  Goal: Absence or prevention of progression during hospitalization  Description: INTERVENTIONS:  - Assess and monitor for signs and symptoms of infection  - Monitor lab/diagnostic results  - Monitor all insertion sites, i.e. indwelling lines, tubes, and drains  - Administer medications as ordered  - Instruct and encourage patient and family to use good hand hygiene technique  - Identify and instruct in appropriate isolation precautions for identified infection/condition  Outcome: Progressing     Problem: SAFETY  ADULT  Goal: Maintain or return to baseline ADL function  Description: INTERVENTIONS:  -  Assess patient's ability to carry out ADLs; assess patient's baseline for ADL function and identify physical deficits which impact ability to perform ADLs (bathing, care of mouth/teeth, toileting, grooming, dressing, etc.)  - Assess/evaluate cause of self-care deficits   - Assess range of motion  - Assess patient's mobility; develop plan if impaired  - Assess patient's need for assistive devices and provide as appropriate  - Encourage maximum independence but intervene and supervise when necessary  - Involve family in performance of ADLs  - Assess for home care needs following discharge   - Consider OT consult to assist with ADL evaluation and planning for discharge  - Provide patient education as appropriate  Outcome: Progressing

## 2024-08-20 NOTE — DISCHARGE INSTR - AVS FIRST PAGE
You are to follow-up with your PCP in 1 to 2 weeks    You are to follow-up with your primary infectious disease and neurosurgical team at [facility]    You are to follow-up with Dr. Cote for your venous stasis wounds    You are to follow-up with urology    You will be discharged with the following regimen:  Lisinopril 5 mg to be taken daily  Your Procardia has been changed to 60 mg to be taken twice a day to allow for better blood pressure control  Levaquin 750 mg to be taken daily for 15 more days  You are to continue your doxycycline 100 mg to be taken twice a day

## 2024-08-20 NOTE — DISCHARGE SUMMARY
Formerly [facility]  Discharge- Juan San 1955, 69 y.o. male MRN: [id number]  Unit/Bed#: -Fidel Encounter: [id number]  Primary Care Provider: Sabra Magaña DO   Date and time admitted to hospital: 8/15/2024  2:07 PM    * Sepsis without acute organ dysfunction (HCC)  Assessment & Plan  Meeting sepsis criteria with tachycardia, tachypnea, leukocytosis  Possibly secondary to hardware infection and back versus bacteremia  Lactic acid cleared  Dc'd IV hydration  Completed IV cefepime day 5  Continue Doxy 100 bid  blood cultures NGTD  MRI prostate 1.  The previously described peripherally enhancing lesions within the transitional zone of the prostate gland appear minimally improved from the prior MRI, but overall minimally changed since 7/1/2024. While these are concerning for abscesses, given the overall lack of change, this may not represent the source of sepsis. 2.  Abnormal fluid signal and productive changes at the L5-S1 level of the lumbar spine, consistent with discitis osteomyelitis. Correlation with lumbar spine MRI is recommended.   Leukocytosis improved    Pt to f/u with PCP, ID, neurosurgery and urology outpt-> continue doxy 100 bid and will dc'd with Levaquin 750 qd for 15 more days    Acute on chronic bilateral low back pain with sciatica  Assessment & Plan  Acute on chronic back pain status post lumbar fusion  Follows with [facility]  Pain control with lidocaine, Tylenol, oxycodone, Dilaudid as needed    Hypertensive urgency  Assessment & Plan  Severely elevated blood pressures into the 220s  Multifactorial as he did not take his home antihypertensive this morning and he is in severe back pain  Treat pain with analgesic regimen  Changed Procardia 60 bid   Toprol-XL 50 mg twice daily  Added lisinopril 5 mg qd  Measure BP q4h  Monitor blood pressure trend closely    Status post lumbar spinal fusion  Assessment & Plan  Patient is status post lumbar spinal  fusion at [facility]  He is on oral doxycycline suppressive therapy  History of infected hardware in back and follows with neurosurgery  Case was discussed with his primary neurosurgeon, zhanna to stay at [facility] for antibiotics, blood cultures and pain control.    Type 2 diabetes mellitus with hyperglycemia, without long-term current use of insulin (HCC)  Assessment & Plan  Lab Results   Component Value Date    HGBA1C 7.9 (H) 05/19/2024       Recent Labs     08/19/24  1617 08/19/24  2047 08/20/24  0743 08/20/24  1039   POCGLU 101 107 130 217*       Blood Sugar Average: Last 72 hrs:  (P) 145.4212485105964387Q2k 7.9  Hold metformin and start sliding scale while hospitalized      Anxiety and depression  Assessment & Plan  Resume home Lexapro, Atarax, Remeron        Medical Problems       Resolved Problems  Date Reviewed: 8/18/2024   None       Discharging Physician / Practitioner: Shweta Shannon MD  PCP: Sabra Magaña DO  Admission Date:   Admission Orders (From admission, onward)       Ordered        08/15/24 1712  INPATIENT ADMISSION  Once                          Discharge Date: 08/20/24    Consultations During Hospital Stay:  ID  Podiatry  Urology  Case management      Procedures Performed:   MRI pelvis male wo and w contrast   Final Result      1.  The previously described peripherally enhancing lesions within the transitional zone of the prostate gland appear minimally improved from the prior MRI, but overall minimally changed since 7/1/2024. While these are concerning for abscesses, given the    overall lack of change, this may not represent the source of sepsis.   2.  Abnormal fluid signal and productive changes at the L5-S1 level of the lumbar spine, consistent with discitis osteomyelitis. Correlation with lumbar spine MRI is recommended.      Workstation performed: [id number]         XR chest 1 view portable   ED Interpretation   No acute abnormalities.       Final Result       No acute cardiopulmonary disease.            Workstation performed: [id number]         CT spine lumbar w contrast   Final Result      No significant change since prior examination from 6/27/2024. No new areas of discitis osteomyelitis identified.      Stable chronic discitis osteomyelitis at L5-S1 and lesser extent L1-L2. Endplate irregularity again noted at L2-L3 and L4-L5, stable.      No discrete paraspinal collections are seen. Limited evaluation for a collection within the spinal canal secondary to streak artifact from orthopedic hardware.      Workstation performed: [id number]               Significant Findings / Test Results:   See above    Incidental Findings:   See above   I reviewed the above mentioned incidental findings with the patient and/or family and they expressed understanding.    Test Results Pending at Discharge (will require follow up):   none     Outpatient Tests Requested:  none    Complications:  none known at this time    Reason for Admission: Sepsis    Hospital Course:   Juan San is a 69 y.o. male patient who originally presented to the hospital on 8/15/2024 due to chills and acute on chronic back pain.  In the ED patient was noted to have/meet sepsis criteria and was noted to have hypertensive urgency.  Patient was started on IV fluids and broad-spectrum antibiotics.  Blood cultures were obtained ID was consulted.  Patient was initially continued on home regimen.  Blood pressure slightly improved however continued to be elevated Procardia was changed to 60 twice daily for better blood pressure control and lisinopril was added.  ID recommended MRI prostate.  Blood cultures were negative.  MRI prostate showing possible prostatic abscesses.  Urology was consulted.  Urology recommended no intervention at this time.  Blood pressures have significantly improved.  He has otherwise remained hemodynamically stable afebrile in no acute respiratory distress.  Leukocytosis almost resolved.  He  "has been medically cleared for discharge by internal medicine and urology and ID.  He is to follow-up with podiatry, PCP ID neurosurgery and urology.  He will be discharged with lisinopril 5 mg daily his Procardia has been changed to 60 mg twice daily he is to continue doxycycline 100 mg twice daily and is to continue Levaquin 750 mg daily for 15 days.    Hospital Course: No notes on file        Please see above list of diagnoses and related plan for additional information.     Condition at Discharge: stable    Discharge Day Visit / Exam:   Subjective:  Juan was seen and examined at bedside.  No acute events overnight.  Discussed plan of care.  All questions and concerns were answered and addressed.  Has no acute complaints at this time.  Continues to have some back pain but overall is feeling much improved.  Patient also clinically improved.  Vitals: Blood Pressure: 149/73 (08/20/24 0747)  Pulse: 65 (08/20/24 0719)  Temperature: (!) 97.3 °F (36.3 °C) (08/20/24 0719)  Temp Source: Temporal (08/20/24 0719)  Respirations: 18 (08/19/24 1959)  Height: 5' 8\" (172.7 cm) (08/15/24 1900)  Weight - Scale: 75.1 kg (165 lb 9.1 oz) (08/15/24 1900)  SpO2: 94 % (08/20/24 0719)  Exam:   Physical Exam  Vitals and nursing note reviewed.   Constitutional:       General: He is not in acute distress.     Appearance: He is not ill-appearing.   HENT:      Head: Normocephalic and atraumatic.   Cardiovascular:      Rate and Rhythm: Normal rate and regular rhythm.      Pulses: Normal pulses.      Heart sounds: Normal heart sounds.   Pulmonary:      Effort: Pulmonary effort is normal.      Breath sounds: Normal breath sounds.   Abdominal:      General: Abdomen is flat. Bowel sounds are normal.      Palpations: Abdomen is soft.   Musculoskeletal:      Right lower leg: No edema.      Left lower leg: No edema.   Skin:     General: Skin is warm.   Neurological:      General: No focal deficit present.      Mental Status: He is alert and " oriented to person, place, and time.          Discussion with Family: Updated  (wife) via phone.    Discharge instructions/Information to patient and family:   See after visit summary for information provided to patient and family.      Provisions for Follow-Up Care:  See after visit summary for information related to follow-up care and any pertinent home health orders.      Mobility at time of Discharge:   Basic Mobility Inpatient Raw Score: 20  JH-HLM Goal: 6: Walk 10 steps or more  JH-HLM Achieved: 6: Walk 10 steps or more  HLM Goal achieved. Continue to encourage appropriate mobility.     Disposition:   Home    Planned Readmission: no     Discharge Statement:  I spent 40 minutes discharging the patient. This time was spent on the day of discharge. I had direct contact with the patient on the day of discharge. Greater than 50% of the total time was spent examining patient, answering all patient questions, arranging and discussing plan of care with patient as well as directly providing post-discharge instructions.  Additional time then spent on discharge activities.    Discharge Medications:  See after visit summary for reconciled discharge medications provided to patient and/or family.      **Please Note: This note may have been constructed using a voice recognition system**

## 2024-08-20 NOTE — PLAN OF CARE
Problem: Potential for Falls  Goal: Patient will remain free of falls  Description: INTERVENTIONS:  - Educate patient/family on patient safety including physical limitations  - Instruct patient to call for assistance with activity   - Consult OT/PT to assist with strengthening/mobility   - Keep Call bell within reach  - Keep bed low and locked with side rails adjusted as appropriate  - Keep care items and personal belongings within reach  - Initiate and maintain comfort rounds  - Make Fall Risk Sign visible to staff  - Offer Toileting every 2 Hours, in advance of need  - Initiate/Maintain bed alarm  - Obtain necessary fall risk management equipment: socks  - Apply yellow socks and bracelet for high fall risk patients  - Consider moving patient to room near nurses station  Outcome: Progressing     Problem: PAIN - ADULT  Goal: Verbalizes/displays adequate comfort level or baseline comfort level  Description: Interventions:  - Encourage patient to monitor pain and request assistance  - Assess pain using appropriate pain scale  - Administer analgesics based on type and severity of pain and evaluate response  - Implement non-pharmacological measures as appropriate and evaluate response  - Consider cultural and social influences on pain and pain management  - Notify physician/advanced practitioner if interventions unsuccessful or patient reports new pain  Outcome: Progressing     Problem: INFECTION - ADULT  Goal: Absence or prevention of progression during hospitalization  Description: INTERVENTIONS:  - Assess and monitor for signs and symptoms of infection  - Monitor lab/diagnostic results  - Monitor all insertion sites, i.e. indwelling lines, tubes, and drains  - Administer medications as ordered  - Instruct and encourage patient and family to use good hand hygiene technique  - Identify and instruct in appropriate isolation precautions for identified infection/condition  Outcome: Progressing     Problem: SAFETY  ADULT  Goal: Patient will remain free of falls  Description: INTERVENTIONS:  - Educate patient/family on patient safety including physical limitations  - Instruct patient to call for assistance with activity   - Consult OT/PT to assist with strengthening/mobility   - Keep Call bell within reach  - Keep bed low and locked with side rails adjusted as appropriate  - Keep care items and personal belongings within reach  - Initiate and maintain comfort rounds  - Make Fall Risk Sign visible to staff  - Offer Toileting every 2 Hours, in advance of need  - Initiate/Maintain bed alarm  - Obtain necessary fall risk management equipment: socks  - Apply yellow socks and bracelet for high fall risk patients  - Consider moving patient to room near nurses station  Outcome: Progressing  Goal: Maintain or return to baseline ADL function  Description: INTERVENTIONS:  -  Assess patient's ability to carry out ADLs; assess patient's baseline for ADL function and identify physical deficits which impact ability to perform ADLs (bathing, care of mouth/teeth, toileting, grooming, dressing, etc.)  - Assess/evaluate cause of self-care deficits   - Assess range of motion  - Assess patient's mobility; develop plan if impaired  - Assess patient's need for assistive devices and provide as appropriate  - Encourage maximum independence but intervene and supervise when necessary  - Involve family in performance of ADLs  - Assess for home care needs following discharge   - Consider OT consult to assist with ADL evaluation and planning for discharge  - Provide patient education as appropriate  Outcome: Progressing  Goal: Maintains/Returns to pre admission functional level  Description: INTERVENTIONS:  - Perform AM-PAC 6 Click Basic Mobility/ Daily Activity assessment daily.  - Set and communicate daily mobility goal to care team and patient/family/caregiver.   - Collaborate with rehabilitation services on mobility goals if consulted  - Perform Range  of Motion 3 times a day.  - Reposition patient every 2 hours.  - Dangle patient 3 times a day  - Stand patient 3 times a day  - Ambulate patient 3 times a day  - Out of bed to chair 3 times a day   - Out of bed for meals 3 times a day  - Out of bed for toileting  - Record patient progress and toleration of activity level   Outcome: Progressing     Problem: DISCHARGE PLANNING  Goal: Discharge to home or other facility with appropriate resources  Description: INTERVENTIONS:  - Identify barriers to discharge w/patient and caregiver  - Arrange for needed discharge resources and transportation as appropriate  - Identify discharge learning needs (meds, wound care, etc.)  - Arrange for interpretive services to assist at discharge as needed  - Refer to Case Management Department for coordinating discharge planning if the patient needs post-hospital services based on physician/advanced practitioner order or complex needs related to functional status, cognitive ability, or social support system  Outcome: Progressing     Problem: Knowledge Deficit  Goal: Patient/family/caregiver demonstrates understanding of disease process, treatment plan, medications, and discharge instructions  Description: Complete learning assessment and assess knowledge base.  Interventions:  - Provide teaching at level of understanding  - Provide teaching via preferred learning methods  Outcome: Progressing     Problem: Prexisting or High Potential for Compromised Skin Integrity  Goal: Skin integrity is maintained or improved  Description: INTERVENTIONS:  - Identify patients at risk for skin breakdown  - Assess and monitor skin integrity  - Assess and monitor nutrition and hydration status  - Monitor labs   - Assess for incontinence   - Turn and reposition patient  - Assist with mobility/ambulation  - Relieve pressure over bony prominences  - Avoid friction and shearing  - Provide appropriate hygiene as needed including keeping skin clean and dry  -  Evaluate need for skin moisturizer/barrier cream  - Collaborate with interdisciplinary team   - Patient/family teaching  - Consider wound care consult   Outcome: Progressing

## 2024-08-20 NOTE — QUICK NOTE
No fever and WBC count has decreased to 10.3K. Bld cx's were neg. Vanco was discontinued and po Doxycycline was restarted on 8/18 for chronic abx suppression of lumbar infxn in setting of hardware. Cefepime was continued while MRI of prostate was pending. MRI study showed peripherally enhancing lesions within the transitional zone of the prostate gland which appear minimally improved from the prior MRI, but overall minimally changed since 7/1/2024. While these are concerning for abscesses, given the overall lack of change.  Urology saw the Pt and no surgical intervention is needed at this time - prostatic lesions will be monitored.    - Cont Doxycycline for chronic abx suppression  - When Pt is ready for d/c, would tx with Levaquin 750 mg po Q24hrs x 15 more days to complete an additional 3 week course of abx tx for persistent prostatic abscesses  - If no recurrence of N/V since Doxycycline was restarted, OK to d/c Pt today if cleared medically.  - Pt should f/u with ID and Neurosurgery at [facility] and Urology at [facility]

## 2024-08-20 NOTE — CASE MANAGEMENT
Case Management Discharge Planning Note    Patient name Juan San  Location /-01 MRN [id number]  : 1955 Date 2024       Current Admission Date: 8/15/2024  Current Admission Diagnosis:Sepsis with acute organ dysfunction (HCC)   Patient Active Problem List    Diagnosis Date Noted Date Diagnosed    Prostatitis 2024     Lower extremity edema 2024     Venous stasis ulcers (HCC) 2024     LIGIA (acute kidney injury) (HCC) 2024     Failure of joint fusion (HCC) 2024     Sepsis with acute organ dysfunction (HCC) 2024     Lactic acidosis 2024     Type 2 diabetes mellitus with hyperglycemia, without long-term current use of insulin (HCC) 2024     Foraminal stenosis of lumbar region 2024     Discitis of lumbosacral region 2024     Hypochromic microcytic anemia 10/10/2023     Acute on chronic bilateral low back pain with sciatica 10/09/2023     Anxiety and depression 10/09/2023     Hypertension 10/09/2023     Benign prostatic hyperplasia with urinary retention 2023     Scrotal pain 2023     Lower urinary tract symptoms 2023     Status post lumbar spinal fusion 2023     Hyponatremia 2023     Gallstones 2023     Anemia 2023     Urinary retention 2023     PONV (postoperative nausea and vomiting)      Medical marijuana use      Chronic bilateral low back pain without sciatica 2023     Lumbar radiculopathy      Chronic pain syndrome 2022     Liver disease 08/10/2022     Hypertensive urgency 2022     Bronchitis, mucopurulent recurrent (HCC) 2022     Cirrhosis, alcoholic (HCC) 2022     Diabetic peripheral neuropathy (HCC) 2022     Herniated nucleus pulposus of lumbosacral region 2022     Thyroid cancer (HCC) 2021     Alcoholism in remission (HCC) 2021     Benzodiazepine dependence (HCC) 2021     Bilateral adhesive capsulitis of shoulders  07/30/2021     DM (diabetes mellitus) (HCC) 07/30/2021     Hypercholesterolemia 07/30/2021     Lumbar spondylosis 07/30/2021       LOS (days): 5  Geometric Mean LOS (GMLOS) (days): 5.1  Days to GMLOS:0.3     OBJECTIVE:  Risk of Unplanned Readmission Score: 51.26         Current admission status: Inpatient   Preferred Pharmacy:   [facility] [address] [address]  Phone: [phone] Fax: [phone]    Primary Care Provider: Sabra Magaña DO    Primary Insurance: MEDICARE  Secondary Insurance: [facility] [place]    DISCHARGE DETAILS:        Notification made to OP CM Handoff: [facility] OP CM regarding discharge planning and disposition.           CM left message for Nieves at [facility] office with a handoff.

## 2024-08-20 NOTE — CASE MANAGEMENT
Case Management Discharge Planning Note    Patient name Juan San  Location /-01 MRN [id number]  : 1955 Date 2024       Current Admission Date: 8/15/2024  Current Admission Diagnosis:Sepsis with acute organ dysfunction (HCC)   Patient Active Problem List    Diagnosis Date Noted Date Diagnosed    Prostatitis 2024     Lower extremity edema 2024     Venous stasis ulcers (HCC) 2024     LIGIA (acute kidney injury) (HCC) 2024     Failure of joint fusion (HCC) 2024     Sepsis with acute organ dysfunction (HCC) 2024     Lactic acidosis 2024     Type 2 diabetes mellitus with hyperglycemia, without long-term current use of insulin (HCC) 2024     Foraminal stenosis of lumbar region 2024     Discitis of lumbosacral region 2024     Hypochromic microcytic anemia 10/10/2023     Acute on chronic bilateral low back pain with sciatica 10/09/2023     Anxiety and depression 10/09/2023     Hypertension 10/09/2023     Benign prostatic hyperplasia with urinary retention 2023     Scrotal pain 2023     Lower urinary tract symptoms 2023     Status post lumbar spinal fusion 2023     Hyponatremia 2023     Gallstones 2023     Anemia 2023     Urinary retention 2023     PONV (postoperative nausea and vomiting)      Medical marijuana use      Chronic bilateral low back pain without sciatica 2023     Lumbar radiculopathy      Chronic pain syndrome 2022     Liver disease 08/10/2022     Hypertensive urgency 2022     Bronchitis, mucopurulent recurrent (HCC) 2022     Cirrhosis, alcoholic (HCC) 2022     Diabetic peripheral neuropathy (HCC) 2022     Herniated nucleus pulposus of lumbosacral region 2022     Thyroid cancer (HCC) 2021     Alcoholism in remission (HCC) 2021     Benzodiazepine dependence (HCC) 2021     Bilateral adhesive capsulitis of shoulders  07/30/2021     DM (diabetes mellitus) (HCC) 07/30/2021     Hypercholesterolemia 07/30/2021     Lumbar spondylosis 07/30/2021       LOS (days): 5  Geometric Mean LOS (GMLOS) (days): 5.1  Days to GMLOS:0.5     OBJECTIVE:  Risk of Unplanned Readmission Score: 50.54         Current admission status: Inpatient   Preferred Pharmacy:   [facility] [address] [address]  Phone: [phone] Fax: [phone]    Primary Care Provider: Sabra Magaña DO    Primary Insurance: MEDICARE  Secondary Insurance: [facility]    DISCHARGE DETAILS:        IMM reviewed with patient, patient agrees with discharge determination.    IMM Given (Date):: 08/20/24 (836am)  IMM Given to:: Patient

## 2024-08-20 NOTE — INCIDENTAL FINDINGS
The following findings require follow up:  Radiographic finding   Finding: MRI pelvis male wo and w contrast: 1. The previously described peripherally enhancing lesions within the transitional zone of the prostate gland appear minimally improved from the prior MRI, but overall minimally changed since 7/1/2024. While these are concerning for abscesses, given the, overall lack of change, this may not represent the source of sepsis., 2. Abnormal fluid signal and productive changes at the L5-S1 level of the lumbar spine, consistent with discitis osteomyelitis. Correlation with lumbar spine MRI is recommended    Follow up required: yes   Follow up should be done within 1-2 weeks     Please notify the following clinician to assist with the follow up:   PCP primary ID and neurosurgery team at [facility]    Incidental finding results were discussed with the Patient by Shweta Shannon MD on 08/20/24.   They expressed understanding and all questions answered.

## 2024-08-20 NOTE — PROGRESS NOTES
"Progress Note - Juan San 69 y.o. male MRN: [id number]    Unit/Bed#: -01 Encounter: [id number]      Assessment:  Prostate abscesses, sepsis POA  Pt is 70 y/o male with pmh for DM, h/o ETOH abuse, ETOH cirrhosis, HLD, chronic back pain s/p fusion and recent lami for eval of abscess (6/14/24) sent home with PICC and abx,Thyroid ca, BPH and HTN who presents to the hospital for acute on chronic back pain.      MRI pelvis:   1.  The previously described peripherally enhancing lesions within the transitional zone of the prostate gland appear minimally improved from the prior MRI, but overall minimally changed since 7/1/2024. While these are concerning for abscesses, given the overall lack of change, this may not represent the source of sepsis.  2.  Abnormal fluid signal and productive changes at the L5-S1 level of the lumbar spine, consistent with discitis osteomyelitis. Correlation with lumbar spine MRI is recommended.     VSS, afeb  WBC 10.32/14.44/11.86  Hgb 10.5/12.2  Creat 0.81/0.71     Plan:  MRI reviewed yesterday   Plan to monitor for now  No urologic intervention at this time  Prostate abscess vs discitis with osteo as possible sources  ID on board      Subjective:   I feel better today. I still have back pain.     Objective:     Vitals: Blood pressure 149/73, pulse 65, temperature (!) 97.3 °F (36.3 °C), temperature source Temporal, resp. rate 18, height 5' 8\" (1.727 m), weight 75.1 kg (165 lb 9.1 oz), SpO2 94%.,Body mass index is 25.17 kg/m².      Intake/Output Summary (Last 24 hours) at 8/20/2024 0906  Last data filed at 8/20/2024 0719  Gross per 24 hour   Intake 1245 ml   Output 500 ml   Net 745 ml       Physical Exam: General appearance: alert and oriented, in no acute distress  Lungs: clear to auscultation bilaterally  Heart: regular rate and rhythm, S1, S2 normal, no murmur, click, rub or gallop  Abdomen: soft, non-tender; bowel sounds normal; no masses,  no organomegaly     Invasive Devices  "      Peripheral Intravenous Line  Duration             Peripheral IV 08/17/24 Left;Ventral (anterior) Forearm 2 days                    Lab, Imaging and other studies: I have personally reviewed pertinent reports.    VTE Pharmacologic Prophylaxis: Enoxaparin (Lovenox)  VTE Mechanical Prophylaxis: sequential compression device

## 2024-08-20 NOTE — UTILIZATION REVIEW
Continued Stay Review    Date: 8/20/24                           Current Patient Class: Inpatient  Current Level of Care: med surg    HPI:69 y.o. male initially admitted on 8/15/24 inpatient:  Sepsis.  Prostate abscess vs discitis with osteo as possible sources     Presented to ED with nausea and vomiting starting morning of arrival. + chills.  Acute on chronic back pain.  Post-op Lumbar discitis/osteo with hardware infxn/Chronic abx suppression  PMH:  DM, h/o ETOH abuse, ETOH cirrhosis, HLD, chronic back pain s/p fusion and recent lami for eval of abscess (6/14/24) sent home with PICC and abx,Thyroid ca, BPH and HTN     Assessment/Plan:     8/20/2024 .  Patient presents with  continued back pain, rates as 10/10, using IV Dilaudid and oxycodone.  On IV Reglan started yesterday for nausea.      On exam abdomen soft.    Abnormal labs or imaging: MRI study showed peripherally enhancing lesions within the transitional zone of the prostate gland which appear minimally improved from the prior MRI, but overall minimally changed since 7/1/2024.    glucose 130> 217.  Mg 1.7.  wbc 10.32.  H&H 10.5/33.6.   blood cultures from 8/15/24 no growth.    Diagnosis/Plan    Sepsis/Prostate abscess/hypertensive urgency.  Per Urology, no surgical intervention for prostate abscess.  Plan is to continue doxycycline for chronic suppressions.  Continue IV cefepime.   F/u with ID and Neurosurgery at [facility] and Urology at [facility].   Pain control with lidocaine, Tylenol, oxycodone, Dilaudid as needed .  Monitor BP and Procardia changed, Toprol  XL continues, lisinopril added.   Metformin on hold, SSI continues.  IV Reglan, monitor intake.     Vital Signs (last 3 days)       Date/Time Temp Pulse Resp BP MAP (mmHg) SpO2 O2 Device Patient Position - Orthostatic VS Yovanny Coma Scale Score Pain    08/20/24 0900 -- -- -- -- -- -- None (Room air) -- -- 3    08/20/24 0747 -- -- -- 149/73 -- -- -- -- -- --    08/20/24 0746 -- -- -- -- -- -- -- --  -- 10 - Worst Possible Pain    08/20/24 07:19:34 97.3 °F (36.3 °C) 65 -- 149/73 98 94 % None (Room air) Lying -- --    08/20/24 04:29:52 -- 63 -- 130/70 90 98 % -- -- -- --    08/20/24 0429 -- -- -- -- -- -- -- -- -- 8 08/20/24 0400 -- -- -- 130/70 -- -- -- -- -- --    08/20/24 0307 -- -- -- -- -- -- -- -- -- 5    08/20/24 0040 -- -- -- -- -- -- -- -- -- 6    08/19/24 2058 -- -- -- -- -- -- -- -- -- 8 08/19/24 19:59:20 97.7 °F (36.5 °C) 77 18 158/81 107 97 % None (Room air) Lying -- 8    08/19/24 1650 -- -- -- -- -- -- -- -- -- 10 - Worst Possible Pain    08/19/24 15:53:50 97.5 °F (36.4 °C) 66 -- 151/78 102 97 % None (Room air) Lying -- --    08/19/24 1237 -- -- -- -- -- -- -- -- -- 10 - Worst Possible Pain    08/19/24 10:51:33 -- 71 -- 164/88 113 98 % -- -- -- --    08/19/24 1049 -- -- -- 164/88 -- -- -- -- -- --    08/19/24 0803 -- -- -- -- -- -- -- -- -- 10 - Worst Possible Pain    08/19/24 07:49:25 97.9 °F (36.6 °C) 105 -- 178/96 123 98 % None (Room air) -- 15 --    08/18/24 2205 -- -- -- -- -- -- -- -- -- 9    08/18/24 2000 -- -- -- -- -- -- -- -- 15 --    08/18/24 1958 -- -- -- -- -- -- -- -- -- 10 - Worst Possible Pain    08/18/24 19:50:55 97.7 °F (36.5 °C) 80 -- 140/78 99 97 % None (Room air) Lying -- --    08/18/24 15:41:46 97.3 °F (36.3 °C) 68 16 121/64 83 99 % None (Room air) Lying -- --    08/18/24 1110 -- -- -- -- -- -- -- -- -- 10 - Worst Possible Pain    08/18/24 1052 -- -- -- -- -- -- None (Room air) -- 15 10 - Worst Possible Pain    08/18/24 0845 -- -- -- -- -- -- -- -- -- 10 - Worst Possible Pain    08/18/24 08:23:37 -- 76 17 154/79 104 99 % -- -- -- --    08/18/24 0812 -- -- -- -- -- -- -- -- -- 8    08/18/24 0730 -- -- -- -- -- -- -- -- -- 7    08/18/24 0630 -- -- -- -- -- -- -- -- -- 10 - Worst Possible Pain    08/18/24 0410 -- -- -- -- -- -- -- -- -- 10 - Worst Possible Pain    08/18/24 0152 -- -- -- -- -- -- -- -- -- 10 - Worst Possible Pain    08/17/24 2352 -- -- -- -- -- -- -- -- -- 10  - Worst Possible Pain    08/17/24 2100 -- -- -- -- -- -- -- -- 15 --    08/17/24 2058 -- -- -- -- -- -- -- -- -- 10 - Worst Possible Pain    08/17/24 1940 -- -- -- -- -- -- -- -- -- 10 - Worst Possible Pain    08/17/24 19:10:48 97.7 °F (36.5 °C) 67 -- 140/72 95 97 % None (Room air) Lying -- --    08/17/24 15:03:41 97.5 °F (36.4 °C) -- -- 159/80 106 -- -- -- -- --    08/17/24 0924 -- -- -- -- -- -- -- -- 15 --    08/17/24 08:27:12 97.7 °F (36.5 °C) 100 18 149/94 112 100 % None (Room air) Lying -- --    08/17/24 0804 -- -- -- -- -- -- -- -- -- 10 - Worst Possible Pain    08/17/24 0236 -- -- -- -- -- -- -- -- -- 10 - Worst Possible Pain            Pertinent Labs/Diagnostic Results:   Radiology:  MRI pelvis male wo and w contrast   Final Interpretation by Ignacio Cardoso MD (08/19 0932)      1.  The previously described peripherally enhancing lesions within the transitional zone of the prostate gland appear minimally improved from the prior MRI, but overall minimally changed since 7/1/2024. While these are concerning for abscesses, given the    overall lack of change, this may not represent the source of sepsis.   2.  Abnormal fluid signal and productive changes at the L5-S1 level of the lumbar spine, consistent with discitis osteomyelitis. Correlation with lumbar spine MRI is recommended.      Workstation performed: [id number]         XR chest 1 view portable   ED Interpretation by Libertad Hansen PA-C (08/15 1018)   No acute abnormalities.       Final Interpretation by Silvio Cao MD (08/16 5249)      No acute cardiopulmonary disease.            Workstation performed: [id number]         CT spine lumbar w contrast   Final Interpretation by Paresh Estrada MD (08/15 7445)      No significant change since prior examination from 6/27/2024. No new areas of discitis osteomyelitis identified.      Stable chronic discitis osteomyelitis at L5-S1 and lesser extent L1-L2. Endplate irregularity again noted at L2-L3  and L4-L5, stable.      No discrete paraspinal collections are seen. Limited evaluation for a collection within the spinal canal secondary to streak artifact from orthopedic hardware.      Workstation performed: [id number]           Cardiology:  ECG 12 lead   Final Result by Amarjit Blackmon MD (08/16 1401)   Sinus rhythm with Premature atrial complexes   Otherwise normal ECG   When compared with ECG of 19-MAY-2024 02:10,   No significant change was found   Confirmed by Amarjit Blackmon ([id number]) on 8/16/2024 2:01:52 PM        Results from last 7 days   Lab Units 08/15/24  1437   SARS-COV-2  Negative     Results from last 7 days   Lab Units 08/20/24 0447 08/19/24 0321 08/18/24 0344 08/17/24  0344 08/16/24  0507   WBC Thousand/uL 10.32* 14.44* 11.86* 12.51* 10.68*   HEMOGLOBIN g/dL 10.5* 12.2 10.5* 11.5* 10.7*   HEMATOCRIT % 33.6* 39.4 34.2* 37.3 34.1*   PLATELETS Thousands/uL 316 372 327 373 395*   TOTAL NEUT ABS Thousands/µL 6.37 13.22* 8.54* 9.01* 8.15*     Results from last 7 days   Lab Units 08/20/24 0447 08/19/24 0321 08/18/24 0344 08/17/24  0344 08/16/24  0507   SODIUM mmol/L 136 137 138 139 141   POTASSIUM mmol/L 3.9 3.0* 3.3* 3.1* 3.1*   CHLORIDE mmol/L 104 98 103 101 105   CO2 mmol/L 26 23 25 26 28   ANION GAP mmol/L 6 16* 10 12 8   BUN mg/dL 15 9 10 10 15   CREATININE mg/dL 0.81 0.71 0.69 0.67 0.70   EGFR ml/min/1.73sq m 91 97 98 99 97   CALCIUM mg/dL 9.0 9.2 8.6 9.2 8.8   MAGNESIUM mg/dL 1.7*  --   --   --   --      Results from last 7 days   Lab Units 08/19/24 0321 08/18/24 0344 08/17/24  0344 08/16/24  0507 08/15/24  1401   AST U/L 20 22 21 13 13   ALT U/L 13 10 9 7 7   ALK PHOS U/L 86 72 80 68 82   TOTAL PROTEIN g/dL 8.1 6.9 7.6 6.7 7.9   ALBUMIN g/dL 4.2 4.0 4.5 4.0 4.6   TOTAL BILIRUBIN mg/dL 0.56 0.44 0.58 0.43 0.49     Results from last 7 days   Lab Units 08/20/24  1039 08/20/24  0743 08/19/24  2047 08/19/24  1617 08/19/24  1137 08/19/24  0655 08/18/24  2046 08/18/24  1600 08/18/24  1031  08/17/24  2055 08/17/24  1532 08/17/24  1212   POC GLUCOSE mg/dl 217* 130 107 101 139 193* 94 85 200* 151* 145* 188*     Results from last 7 days   Lab Units 08/20/24  0447 08/19/24  0321 08/18/24  0344 08/17/24  0344 08/16/24  0507 08/15/24  1401 08/13/24  1445   GLUCOSE RANDOM mg/dL 155* 202* 100 120 151* 188* 174*     Results from last 7 days   Lab Units 08/13/24  1445   CK TOTAL U/L 54     Results from last 7 days   Lab Units 08/15/24  1653 08/15/24  1438   HS TNI 0HR ng/L  --  8   HS TNI 2HR ng/L 9  --    HSTNI D2 ng/L 1  --      Results from last 7 days   Lab Units 08/15/24  1438   PROTIME seconds 13.6   INR  0.99   PTT seconds 33     Results from last 7 days   Lab Units 08/15/24  1438   PROCALCITONIN ng/ml 0.07     Results from last 7 days   Lab Units 08/15/24  1839 08/15/24  1438   LACTIC ACID mmol/L 2.8* 4.7*     Results from last 7 days   Lab Units 08/15/24  1401   LIPASE u/L 9*     Results from last 7 days   Lab Units 08/15/24  1401   CRP mg/L 11.5*   SED RATE mm/hour 32*     Results from last 7 days   Lab Units 08/15/24  1436   CLARITY UA  Clear   COLOR UA  Light Yellow   SPEC GRAV UA  1.010   PH UA  6.0   GLUCOSE UA mg/dl 1000 (1%)*   KETONES UA mg/dl 10 (1+)*   BLOOD UA  Trace*   PROTEIN UA mg/dl Trace*   NITRITE UA  Negative   BILIRUBIN UA  Negative   UROBILINOGEN UA (BE) mg/dl <2.0   LEUKOCYTES UA  Small*   WBC UA /hpf 2-4   RBC UA /hpf 2-4   BACTERIA UA /hpf Occasional   EPITHELIAL CELLS WET PREP /hpf None Seen   MUCUS THREADS  None Seen     Results from last 7 days   Lab Units 08/15/24  1437   INFLUENZA A PCR  Negative   INFLUENZA B PCR  Negative   RSV PCR  Negative     Results from last 7 days   Lab Units 08/15/24  1443 08/15/24  1438   BLOOD CULTURE  No Growth After 4 Days. No Growth After 4 Days.     Results from last 7 days   Lab Units 08/17/24  0344   VANCOMYCIN TR ug/mL 12.6       Medications:   Scheduled Medications:  aspirin, 81 mg, Oral, Daily  cefepime, 2,000 mg, Intravenous,  Q8H  clonazePAM, 1 mg, Oral, BID  doxycycline hyclate, 100 mg, Oral, Q12H CHRISTIE  enoxaparin, 30 mg, Subcutaneous, Q24H CHRISTIE  escitalopram, 20 mg, Oral, Daily  finasteride, 5 mg, Oral, Daily  gabapentin, 100 mg, Oral, Daily  insulin lispro, 1-6 Units, Subcutaneous, TID AC  insulin lispro, 1-6 Units, Subcutaneous, HS  lactobacillus acidophilus-bulgaricus, 1 packet, Oral, TID With Meals  lidocaine, 1 patch, Topical, Daily  lisinopril, 5 mg, Oral, Daily  metoclopramide, 10 mg, Intravenous, Q8H  metoprolol succinate, 50 mg, Oral, BID  mirtazapine, 45 mg, Oral, HS  NIFEdipine, 60 mg, Oral, BID  pravastatin, 20 mg, Oral, Daily With Dinner  senna, 8.6 mg, Oral, BID  tamsulosin, 0.4 mg, Oral, Daily With Dinner    magnesium sulfate 2 g/50 mL IVPB (premix) 2 g  Dose: 2 g  Freq: Once Route: IV  Last Dose: 2 g (08/20/24 0850)  Start: 08/20/24 0730 End: 08/20/24 1050  NIFEdipine (PROCARDIA XL) 24 hr tablet 30 mg  Dose: 30 mg  Freq: Daily Route: PO  Start: 08/16/24 0900 End: 08/19/24 1033    Continuous IV Infusions: dc 8/19/24     PRN Meds:  acetaminophen, 650 mg, Oral, Q6H PRN  HYDROmorphone, 0.5 mg, Intravenous, Q2H PRN x 2 8/20  hydrOXYzine HCL, 50 mg, Oral, BID PRN  methocarbamol, 750 mg, Oral, Q6H PRN  ondansetron, 4 mg, Intravenous, Q4H PRN  oxyCODONE, 5 mg, Oral, Q4H PRN   Or  oxyCODONE, 10 mg, Oral, Q4H PRN x 3 8/20  polyethylene glycol, 17 g, Oral, Daily PRN  trimethobenzamide, 200 mg, Intramuscular, Q6H PRN    Discharge Plan:  to be determined.     Network Utilization Review Department  ATTENTION: Please call with any questions or concerns to [phone] and carefully listen to the prompts so that you are directed to the right person. All voicemails are confidential.   For Discharge needs, contact Care Management DC Support Team at [phone] opt. [phone]  Send all requests for admission clinical reviews, approved or denied determinations and any other requests to dedicated fax number below belonging to the campus where the  patient is receiving treatment. List of dedicated fax numbers for the Facilities:  FACILITY NAME UR FAX NUMBER   ADMISSION DENIALS (Administrative/Medical Necessity) [phone]   DISCHARGE SUPPORT TEAM (NETWORK) [phone]   PARENT CHILD HEALTH (Maternity/NICU/Pediatrics) [phone]   [facility] [phone]   [facility] [phone]   [facility] [phone]   [facility] [phone]   [facility] [phone]   [facility] [phone]   [facility] [phone]   [facility] [phone]   [facility] [phone]   [facility] [phone]   [facility] [phone]   [facility] [phone]

## 2024-08-20 NOTE — CASE MANAGEMENT
Case Management Discharge Planning Note    Patient name Juan San  Location /-01 MRN [id number]  : 1955 Date 2024       Current Admission Date: 8/15/2024  Current Admission Diagnosis:Sepsis with acute organ dysfunction (HCC)   Patient Active Problem List    Diagnosis Date Noted Date Diagnosed    Prostatitis 2024     Lower extremity edema 2024     Venous stasis ulcers (HCC) 2024     LIGIA (acute kidney injury) (HCC) 2024     Failure of joint fusion (HCC) 2024     Sepsis with acute organ dysfunction (HCC) 2024     Lactic acidosis 2024     Type 2 diabetes mellitus with hyperglycemia, without long-term current use of insulin (HCC) 2024     Foraminal stenosis of lumbar region 2024     Discitis of lumbosacral region 2024     Hypochromic microcytic anemia 10/10/2023     Acute on chronic bilateral low back pain with sciatica 10/09/2023     Anxiety and depression 10/09/2023     Hypertension 10/09/2023     Benign prostatic hyperplasia with urinary retention 2023     Scrotal pain 2023     Lower urinary tract symptoms 2023     Status post lumbar spinal fusion 2023     Hyponatremia 2023     Gallstones 2023     Anemia 2023     Urinary retention 2023     PONV (postoperative nausea and vomiting)      Medical marijuana use      Chronic bilateral low back pain without sciatica 2023     Lumbar radiculopathy      Chronic pain syndrome 2022     Liver disease 08/10/2022     Hypertensive urgency 2022     Bronchitis, mucopurulent recurrent (HCC) 2022     Cirrhosis, alcoholic (HCC) 2022     Diabetic peripheral neuropathy (HCC) 2022     Herniated nucleus pulposus of lumbosacral region 2022     Thyroid cancer (HCC) 2021     Alcoholism in remission (HCC) 2021     Benzodiazepine dependence (HCC) 2021     Bilateral adhesive capsulitis of shoulders  07/30/2021     DM (diabetes mellitus) (HCC) 07/30/2021     Hypercholesterolemia 07/30/2021     Lumbar spondylosis 07/30/2021       LOS (days): 5  Geometric Mean LOS (GMLOS) (days): 5.1  Days to GMLOS:0.3     OBJECTIVE:  Risk of Unplanned Readmission Score: 50.61         Current admission status: Inpatient   Preferred Pharmacy:   [facility] [address] [address]  Phone: [phone] Fax: [phone]    Primary Care Provider: Sabra Magaña DO    Primary Insurance: MEDICARE  Secondary Insurance: [facility]    DISCHARGE DETAILS:        [place] [facility] [place] not have any home health needs.           Requested Home Health Care         Is the patient interested in HHC at discharge?: No

## 2024-08-22 NOTE — ASSESSMENT & PLAN NOTE
Lab Results   Component Value Date    HGBA1C 7.9 (H) 05/19/2024       Recent Labs     05/20/24  1650 05/20/24  2128 05/21/24  0748 05/21/24  1051   POCGLU 204* 270* 179* 152*         Blood Sugar Average: Last 72 hrs:  (P) [id number] regimen: Metformin 1000 mg twice daily and Jardiance 10 Mg daily  Placed on SSI while inpatient  Update hemoglobin A1c   [Follow-Up: _____] : a [unfilled] follow-up visit

## 2024-09-04 ENCOUNTER — OFFICE VISIT (OUTPATIENT)
Dept: WOUND CARE | Facility: HOSPITAL | Age: 69
End: 2024-09-04
Payer: COMMERCIAL

## 2024-09-04 VITALS — TEMPERATURE: 97 F

## 2024-09-04 DIAGNOSIS — E11.42 DIABETIC PERIPHERAL NEUROPATHY (HCC): ICD-10-CM

## 2024-09-04 DIAGNOSIS — L97.919 IDIOPATHIC CHRONIC VENOUS HYPERTENSION OF RIGHT LOWER EXTREMITY WITH ULCER (HCC): Primary | ICD-10-CM

## 2024-09-04 DIAGNOSIS — I87.311 IDIOPATHIC CHRONIC VENOUS HYPERTENSION OF RIGHT LOWER EXTREMITY WITH ULCER (HCC): Primary | ICD-10-CM

## 2024-09-04 DIAGNOSIS — E11.621 DIABETIC ULCER OF OTHER PART OF LEFT FOOT ASSOCIATED WITH TYPE 2 DIABETES MELLITUS, WITH FAT LAYER EXPOSED (HCC): ICD-10-CM

## 2024-09-04 DIAGNOSIS — L97.522 DIABETIC ULCER OF OTHER PART OF LEFT FOOT ASSOCIATED WITH TYPE 2 DIABETES MELLITUS, WITH FAT LAYER EXPOSED (HCC): ICD-10-CM

## 2024-09-04 PROCEDURE — 97597 DBRDMT OPN WND 1ST 20 CM/<: CPT | Performed by: PODIATRIST

## 2024-09-04 NOTE — PROGRESS NOTES
Patient ID: Juan San is a 69 y.o. male Date of Birth 1955       Chief Complaint   Patient presents with   • Follow Up Wound Care Visit     Left foot wounds.       Allergies:  Abilify [aripiprazole], Cephalexin, Molds & smuts, and Pregabalin    Diagnosis:  1. Idiopathic chronic venous hypertension of right lower extremity with ulcer (HCC)  -     Wound cleansing and dressings Venous Ulcer Distal;Right Pretibial; Future  -     Wound Procedure Treatment Venous Ulcer Distal;Right Pretibial  2. Diabetic peripheral neuropathy (HCC)  -     Wound cleansing and dressings Venous Ulcer Distal;Right Pretibial; Future  -     Wound Procedure Treatment Venous Ulcer Distal;Right Pretibial  3. Diabetic ulcer of other part of left foot associated with type 2 diabetes mellitus, with fat layer exposed (HCC)  -     Wound cleansing and dressings Venous Ulcer Distal;Right Pretibial; Future  -     Wound Procedure Treatment Venous Ulcer Distal;Right Pretibial     Diagnosis ICD-10-CM Associated Orders   1. Idiopathic chronic venous hypertension of right lower extremity with ulcer (HCC)  I87.311 Wound cleansing and dressings Venous Ulcer Distal;Right Pretibial    L97.919 Wound Procedure Treatment Venous Ulcer Distal;Right Pretibial      2. Diabetic peripheral neuropathy (HCC)  E11.42 Wound cleansing and dressings Venous Ulcer Distal;Right Pretibial     Wound Procedure Treatment Venous Ulcer Distal;Right Pretibial      3. Diabetic ulcer of other part of left foot associated with type 2 diabetes mellitus, with fat layer exposed (HCC)  E11.621 Wound cleansing and dressings Venous Ulcer Distal;Right Pretibial    L97.522 Wound Procedure Treatment Venous Ulcer Distal;Right Pretibial           Assessment & Plan:  Venous stasis ulceration right lower leg, this was debrided through selective tissues and dressed with silicone bordered foam, patient may get foot and leg wet in shower.  Dressing is to be changed 3 times a week.  Venous stasis  "ulceration dorsal right foot is well epithelialized and healed, Skin-Prep was applied to help mature the tissue.  Patient will continue use of Tubigrip for compression during all waking hours.  He may transition to his soft upper/fabric shoes from surgical shoe on the right foot, increase ambulation as tolerated.  Today reviewed frequent elevation, low-sodium diet, proper protein intake, proper glycemic control, pressure and friction reduction, patient wife understand and agree with the plan and will follow-up in 1 week.    Debridement   Wound 07/13/24 Venous Ulcer Pretibial Distal;Right    Universal Protocol:  procedure performed by consultantConsent: Verbal consent obtained.  Risks and benefits: risks, benefits and alternatives were discussed  Consent given by: patient  Time out: Immediately prior to procedure a \"time out\" was called to verify the correct patient, procedure, equipment, support staff and site/side marked as required.  Patient understanding: patient states understanding of the procedure being performed  Patient identity confirmed: verbally with patient    Debridement Details  Performed by: physician  Debridement type: selective  Pain control: lidocaine 4%      Post-debridement measurements  Length (cm): 0.2  Width (cm): 0.2  Depth (cm): 0.1  Percent debrided: 100%  Surface Area (cm^2): 0.04  Area Debrided (cm^2): 0.04  Volume (cm^3): 0    Devitalized tissue debrided: biofilm, callus and slough  Instrument(s) utilized: blade  Bleeding: small  Hemostasis obtained with: pressure  Procedural pain (0-10): insensate  Post-procedural pain: insensate   Response to treatment: procedure was tolerated well       Subjective:   Juan presents today for evaluation and care of right foot and lower leg venous stasis ulcerations, wife has been changing dressings as directed, he states everything on his leg is looking and feeling much better, no new complaints.        The following portions of the patient's history " were reviewed and updated as appropriate:   Patient Active Problem List   Diagnosis   • Alcoholism in remission (HCC)   • Benzodiazepine dependence (HCC)   • Bilateral adhesive capsulitis of shoulders   • Bronchitis, mucopurulent recurrent (HCC)   • Cirrhosis, alcoholic (HCC)   • Diabetic peripheral neuropathy (HCC)   • DM (diabetes mellitus) (HCC)   • Herniated nucleus pulposus of lumbosacral region   • Hypercholesterolemia   • Lumbar spondylosis   • Thyroid cancer (HCC)   • Hypertensive urgency   • Liver disease   • Chronic pain syndrome   • Lumbar radiculopathy   • Chronic bilateral low back pain without sciatica   • PONV (postoperative nausea and vomiting)   • Medical marijuana use   • Urinary retention   • Anemia   • Hyponatremia   • Gallstones   • Status post lumbar spinal fusion   • Benign prostatic hyperplasia with urinary retention   • Scrotal pain   • Lower urinary tract symptoms   • Acute on chronic bilateral low back pain with sciatica   • Anxiety and depression   • Hypertension   • Hypochromic microcytic anemia   • Discitis of lumbosacral region   • Foraminal stenosis of lumbar region   • Sepsis with acute organ dysfunction (HCC)   • Lactic acidosis   • Type 2 diabetes mellitus with hyperglycemia, without long-term current use of insulin (HCC)   • Failure of joint fusion (HCC)   • Prostatitis   • Lower extremity edema   • Venous stasis ulcers (HCC)   • LIGIA (acute kidney injury) (HCC)     Past Medical History:   Diagnosis Date   • Anxiety    • Arthritis    • Cancer (HCC)    • Chronic back pain    • Depression    • Diabetes mellitus (HCC)    • Hypertension    • Liver disease    • Mitral valve prolapse    • Peripheral neuropathy     Neuropathy   • PONV (postoperative nausea and vomiting)    • Thyroid disease      Past Surgical History:   Procedure Laterality Date   • COLONOSCOPY     • INCISION AND DRAINAGE OF WOUND Left 08/12/2022    Procedure: INCISION AND DRAINAGE (I&D) EXTREMITY;  Surgeon: Moustapha  GARRICK Cote;  Location:  MAIN OR;  Service: Podiatry   • IR BIOPSY SPINE  2024   • IR BIOPSY SPINE  2024   • IR PICC PLACEMENT SINGLE LUMEN  2024   • JOINT REPLACEMENT Left 2022    Left TSA   • [place] ARTHRODESIS POSTERIOR/PSTLAT TQ 1NTRSPC LUMBAR Bilateral 2023    Procedure: L1-S1 navigated posterior decompression with instrumented fixation fusion;  Surgeon: James Moraes MD;  Location:  MAIN OR;  Service: Neurosurgery   • THYROID SURGERY      remove cancer     Social History     Socioeconomic History   • Marital status: /Civil Union     Spouse name: Not on file   • Number of children: Not on file   • Years of education: Not on file   • Highest education level: Not on file   Occupational History   • Not on file   Tobacco Use   • Smoking status: Former     Current packs/day: 0.00     Average packs/day: 0.3 packs/day for 5.9 years (1.5 ttl pk-yrs)     Types: Cigarettes     Start date: 1975     Quit date: 1981     Years since quittin.2   • Smokeless tobacco: Never   • Tobacco comments:     quit ; smokes when in pain as of 26   Vaping Use   • Vaping status: Former   • Substances: THC, CBD   Substance and Sexual Activity   • Alcohol use: Never   • Drug use: Yes     Frequency: 7.0 times per week     Types: Marijuana     Comment: Medical Marijuana, takes for pain, daily, vape   • Sexual activity: Yes     Partners: Female     Birth control/protection: None   Other Topics Concern   • Not on file   Social History Narrative   • Not on file     Social Determinants of Health     Financial Resource Strain: Not on file   Food Insecurity: No Food Insecurity (2024)    Hunger Vital Sign    • Worried About Running Out of Food in the Last Year: Never true    • Ran Out of Food in the Last Year: Never true   Transportation Needs: No Transportation Needs (2024)    PRAPARE - Transportation    • Lack of Transportation (Medical): No    • Lack of Transportation  (Non-Medical): No   Physical Activity: Not on file   Stress: Not on file   Social Connections: Not on file   Intimate Partner Violence: Not on file   Housing Stability: Low Risk  (8/16/2024)    Housing Stability Vital Sign    • Unable to Pay for Housing in the Last Year: No    • Number of Times Moved in the Last Year: 1    • Homeless in the Last Year: No        Current Outpatient Medications:   •  ACCU-CHEK FABIANO PLUS test strip, 4 (four) times a day, Disp: , Rfl: 1  •  ACCU-CHEK FASTCLIX LANCETS MISC, 4 (four) times a day, Disp: , Rfl: 1  •  acetaminophen (TYLENOL) 500 mg tablet, Take 2 tablets (1,000 mg total) by mouth every 8 (eight) hours 500 mg tablet, Disp: , Rfl:   •  clonazePAM (KlonoPIN) 1 mg tablet, Take 1 mg by mouth 2 (two) times a day & 3rd pill PRN, Disp: , Rfl:   •  doxycycline hyclate (VIBRAMYCIN) 100 mg capsule, Take 100 mg by mouth 2 (two) times a day Do not start before August 10, 2024., Disp: , Rfl:   •  escitalopram (Lexapro) 20 mg tablet, Take 20 mg by mouth daily. Indications: ., Disp: , Rfl:   •  finasteride (PROSCAR) 5 mg tablet, TAKE 1 TABLET BY MOUTH DAILY, Disp: 100 tablet, Rfl: 1  •  folic acid (FOLVITE) 1 mg tablet, Take 2,000 mcg by mouth daily, Disp: , Rfl: 6  •  furosemide (LASIX) 20 mg tablet, Take 1 tablet (20 mg total) by mouth daily as needed (for weight gain more than 3 lbs overnight or more than 5 lbs in a week, or lower extremity swelling), Disp: 30 tablet, Rfl: 0  •  gabapentin (NEURONTIN) 100 mg capsule, Take 1 capsule (100 mg total) by mouth daily, Disp: 30 capsule, Rfl: 0  •  GNP Aspirin Low Dose 81 MG EC tablet, Take 1 tablet (81 mg total) by mouth daily Do not start before April 25, 2023., Disp: , Rfl: 0  •  hydrOXYzine pamoate (VISTARIL) 50 mg capsule, Take 50 mg by mouth 2 (two) times a day. Indications: Feeling Anxious, Disp: , Rfl:   •  Jardiance 10 MG TABS, Take 10 mg by mouth every morning, Disp: , Rfl:   •  levofloxacin (LEVAQUIN) 750 mg tablet, Take 1 tablet (750  mg total) by mouth every 24 hours for 15 days, Disp: 15 tablet, Rfl: 0  •  lidocaine (LIDODERM) 5 %, Apply 1 patch topically over 12 hours daily Remove & Discard patch within 12 hours or as directed by MD, Disp: 15 patch, Rfl: 0  •  lisinopril (ZESTRIL) 5 mg tablet, Take 1 tablet (5 mg total) by mouth daily, Disp: 30 tablet, Rfl: 0  •  lovastatin (MEVACOR) 20 mg tablet, Take 20 mg by mouth every morning, Disp: , Rfl: 3  •  metFORMIN (GLUCOPHAGE) 1000 MG tablet, Take 1,000 mg by mouth 2 (two) times a day with meals , Disp: , Rfl:   •  methocarbamol (ROBAXIN) 750 mg tablet, Take 1 tablet (750 mg total) by mouth every 6 (six) hours as needed for muscle spasms, Disp: , Rfl:   •  metoprolol succinate (TOPROL-XL) 50 mg 24 hr tablet, Take 50 mg by mouth 2 (two) times a day, Disp: , Rfl:   •  mirtazapine (REMERON) 45 MG tablet, Take 45 mg by mouth daily at bedtime, Disp: , Rfl: 1  •  NIFEdipine (PROCARDIA XL) 30 mg 24 hr tablet, Take 2 tablets (60 mg total) by mouth 2 (two) times a day, Disp: 120 tablet, Rfl: 0  •  ondansetron (Zofran ODT) 4 mg disintegrating tablet, Take 1 tablet (4 mg total) by mouth every 6 (six) hours as needed for nausea or vomiting, Disp: 30 tablet, Rfl: 0  •  oxyCODONE (ROXICODONE) 5 immediate release tablet, Take 1 tablet (5 mg total) by mouth every 6 (six) hours as needed for severe pain Dr. Katie Hearn CRNP Max Daily Amount: 20 mg, Disp: 15 tablet, Rfl: 0  •  patient supplied medication, medical marijuana vape as needed per latest dci  Indications: ., Disp: , Rfl:   •  senna (SENOKOT) 8.6 mg, Take 1 tablet (8.6 mg total) by mouth 2 (two) times a day 2 tabs at bedtime as needed for constipation per latest dci, Disp: , Rfl:   •  tamsulosin (FLOMAX) 0.4 mg, TAKE 1 CAPSULE BY MOUTH DAILY WITH DINNER, Disp: 90 capsule, Rfl: 1  Family History   Problem Relation Age of Onset   • No Known Problems Father    • No Known Problems Mother    • Colon cancer Neg Hx        Review of Systems    Constitutional:  Negative for chills and fever.   HENT:  Negative for ear pain and sore throat.    Eyes:  Negative for pain and visual disturbance.   Respiratory:  Negative for cough and shortness of breath.    Cardiovascular:  Negative for chest pain and palpitations.   Gastrointestinal:  Negative for abdominal pain and vomiting.   Genitourinary:  Negative for dysuria and hematuria.   Musculoskeletal:  Positive for gait problem. Negative for arthralgias and back pain.   Skin:  Positive for color change and wound. Negative for rash.   Neurological:  Positive for numbness. Negative for seizures and syncope.   Psychiatric/Behavioral: Negative.     All other systems reviewed and are negative.        Objective:  Temp (!) 97 °F (36.1 °C)     Physical Exam  Constitutional:       Appearance: Normal appearance. He is normal weight.   HENT:      Head: Normocephalic and atraumatic.      Right Ear: External ear normal.      Left Ear: External ear normal.      Nose: Nose normal.      Mouth/Throat:      Mouth: Mucous membranes are moist.      Pharynx: Oropharynx is clear.   Eyes:      Conjunctiva/sclera: Conjunctivae normal.      Pupils: Pupils are equal, round, and reactive to light.   Cardiovascular:      Pulses: Normal pulses.           Dorsalis pedis pulses are 2+ on the right side and 2+ on the left side.        Posterior tibial pulses are 2+ on the right side and 2+ on the left side.   Pulmonary:      Effort: Pulmonary effort is normal.   Musculoskeletal:      Cervical back: Normal range of motion.      Right lower leg: No edema.      Left lower leg: No edema.   Skin:     General: Skin is warm and dry.      Capillary Refill: Capillary refill takes less than 2 seconds.   Neurological:      General: No focal deficit present.      Mental Status: He is alert and oriented to person, place, and time. Mental status is at baseline.      Sensory: Sensory deficit present.      Coordination: Coordination abnormal.      Gait: Gait  abnormal.      Deep Tendon Reflexes: Reflexes abnormal.   Psychiatric:         Mood and Affect: Mood normal.         Behavior: Behavior normal.         Thought Content: Thought content normal.         Judgment: Judgment normal.         Wound 07/13/24 Venous Ulcer Pretibial Distal;Right (Active)   Wound Image Images linked 09/04/24 0915   Wound Description Granulation tissue 09/04/24 0915   Emely-wound Assessment Intact;Scar Tissue 09/04/24 0915   Wound Length (cm) 0.2 cm 09/04/24 0915   Wound Width (cm) 0.2 cm 09/04/24 0915   Wound Depth (cm) 0.1 cm 09/04/24 0915   Wound Surface Area (cm^2) 0.04 cm^2 09/04/24 0915   Wound Volume (cm^3) 0.004 cm^3 09/04/24 0915   Calculated Wound Volume (cm^3) 0 cm^3 09/04/24 0915   Change in Wound Size % 100 09/04/24 0915   Drainage Amount Small 09/04/24 0915   Non-staged Wound Description Full thickness 09/04/24 0915   Dressing Status Intact 09/04/24 0915       [REMOVED] Wound 07/13/24 Venous Ulcer Foot Anterior;Right (Removed)   Wound Image Images linked 09/04/24 0916   Wound Description Epithelialization 09/04/24 0916   Emely-wound Assessment Intact 09/04/24 0916   Wound Length (cm) 0 cm 09/04/24 0916   Wound Width (cm) 0 cm 09/04/24 0916   Wound Depth (cm) 0 cm 09/04/24 0916   Wound Surface Area (cm^2) 0 cm^2 09/04/24 0916   Wound Volume (cm^3) 0 cm^3 09/04/24 0916   Calculated Wound Volume (cm^3) 0 cm^3 09/04/24 0916   Change in Wound Size % 100 09/04/24 0916   Drainage Amount None 09/04/24 0916   Non-staged Wound Description Not applicable 09/04/24 0916   Dressing Status Intact 09/04/24 0916              Wound Instructions:  Orders Placed This Encounter   Procedures   • Wound cleansing and dressings Venous Ulcer Distal;Right Pretibial     Right distal leg wound    Wash your hands with soap and water.  Remove old dressing, discard into plastic bag and place in trash.  Cleanse the wound with soap and water prior to applying a clean dressing. Do not use tissue or cotton balls. Do  "not scrub the wound. Pat dry using gauze.  Shower yes     Apply silicone border gauze to the open wound.      Change dressing twice weekly    May wear regular shoe     Standing Status:   Future     Standing Expiration Date:   9/11/2024   • Wound Procedure Treatment Venous Ulcer Distal;Right Pretibial     This order was created via procedure documentation   • Debridement Venous Ulcer Distal;Right Pretibial     This order was created via procedure documentation         Yoly Ward DPM, GARRICK, FACFAS    Portions of the record may have been created with voice recognition software. Occasional wrong word or \"sound a like\" substitutions may have occurred due to the inherent limitations of voice recognition software. Read the chart carefully and recognize, using context, where substitutions have occurred.      "

## 2024-09-04 NOTE — PROGRESS NOTES
Wound Procedure Treatment Venous Ulcer Distal;Right Pretibial    Performed by: Opal Avalos RN  Authorized by: Yoly Ward DPM    Associated wounds:   Wound 07/13/24 Venous Ulcer Pretibial Distal;Right  Wound cleansed with:  Soap and water  Applied primary dressing:  Silicone bordered foam

## 2024-09-04 NOTE — PATIENT INSTRUCTIONS
Orders Placed This Encounter   Procedures    Wound cleansing and dressings Venous Ulcer Distal;Right Pretibial     Right distal leg wound    Wash your hands with soap and water.  Remove old dressing, discard into plastic bag and place in trash.  Cleanse the wound with soap and water prior to applying a clean dressing. Do not use tissue or cotton balls. Do not scrub the wound. Pat dry using gauze.  Shower yes     Apply silicone border gauze to the open wound.      Change dressing twice weekly    May wear regular shoe     Standing Status:   Future     Standing Expiration Date:   9/11/2024

## 2024-09-06 ENCOUNTER — HOSPITAL ENCOUNTER (EMERGENCY)
Facility: HOSPITAL | Age: 69
Discharge: HOME | End: 2024-09-06
Attending: EMERGENCY MEDICINE
Payer: COMMERCIAL

## 2024-09-06 VITALS
DIASTOLIC BLOOD PRESSURE: 67 MMHG | TEMPERATURE: 98.3 F | SYSTOLIC BLOOD PRESSURE: 139 MMHG | OXYGEN SATURATION: 100 % | HEART RATE: 84 BPM | RESPIRATION RATE: 19 BRPM

## 2024-09-06 DIAGNOSIS — L56.8 PHOTOSENSITIVITY DERMATITIS: Primary | ICD-10-CM

## 2024-09-06 LAB
ALBUMIN SERPL BCG-MCNC: 3.9 G/DL (ref 3.5–5)
ALP SERPL-CCNC: 71 U/L (ref 34–104)
ALT SERPL W P-5'-P-CCNC: 9 U/L (ref 7–52)
ANION GAP SERPL CALCULATED.3IONS-SCNC: 8 MMOL/L (ref 4–13)
AST SERPL W P-5'-P-CCNC: 32 U/L (ref 13–39)
BASOPHILS # BLD AUTO: 0.04 THOUSANDS/ÂΜL (ref 0–0.1)
BASOPHILS NFR BLD AUTO: 1 % (ref 0–1)
BILIRUB SERPL-MCNC: 0.49 MG/DL (ref 0.2–1)
BUN SERPL-MCNC: 25 MG/DL (ref 5–25)
CALCIUM SERPL-MCNC: 9 MG/DL (ref 8.4–10.2)
CHLORIDE SERPL-SCNC: 104 MMOL/L (ref 96–108)
CO2 SERPL-SCNC: 25 MMOL/L (ref 21–32)
CREAT SERPL-MCNC: 1.01 MG/DL (ref 0.6–1.3)
CRP SERPL QL: 7.4 MG/L
EOSINOPHIL # BLD AUTO: 0.13 THOUSAND/ÂΜL (ref 0–0.61)
EOSINOPHIL NFR BLD AUTO: 2 % (ref 0–6)
ERYTHROCYTE [DISTWIDTH] IN BLOOD BY AUTOMATED COUNT: 18.7 % (ref 11.6–15.1)
GFR SERPL CREATININE-BSD FRML MDRD: 76 ML/MIN/1.73SQ M
GLUCOSE SERPL-MCNC: 66 MG/DL (ref 65–140)
HCT VFR BLD AUTO: 33.2 % (ref 36.5–49.3)
HGB BLD-MCNC: 10.7 G/DL (ref 12–17)
IMM GRANULOCYTES # BLD AUTO: 0.05 THOUSAND/UL (ref 0–0.2)
IMM GRANULOCYTES NFR BLD AUTO: 1 % (ref 0–2)
LACTATE SERPL-SCNC: 0.9 MMOL/L (ref 0.5–2)
LYMPHOCYTES # BLD AUTO: 2.02 THOUSANDS/ÂΜL (ref 0.6–4.47)
LYMPHOCYTES NFR BLD AUTO: 24 % (ref 14–44)
MCH RBC QN AUTO: 26.2 PG (ref 26.8–34.3)
MCHC RBC AUTO-ENTMCNC: 32.2 G/DL (ref 31.4–37.4)
MCV RBC AUTO: 81 FL (ref 82–98)
MONOCYTES # BLD AUTO: 0.41 THOUSAND/ÂΜL (ref 0.17–1.22)
MONOCYTES NFR BLD AUTO: 5 % (ref 4–12)
NEUTROPHILS # BLD AUTO: 5.75 THOUSANDS/ÂΜL (ref 1.85–7.62)
NEUTS SEG NFR BLD AUTO: 67 % (ref 43–75)
NRBC BLD AUTO-RTO: 0 /100 WBCS
PLATELET # BLD AUTO: 205 THOUSANDS/UL (ref 149–390)
PMV BLD AUTO: 10 FL (ref 8.9–12.7)
POTASSIUM SERPL-SCNC: 4 MMOL/L (ref 3.5–5.3)
PROCALCITONIN SERPL-MCNC: 0.07 NG/ML
PROT SERPL-MCNC: 6.7 G/DL (ref 6.4–8.4)
RBC # BLD AUTO: 4.09 MILLION/UL (ref 3.88–5.62)
SODIUM SERPL-SCNC: 137 MMOL/L (ref 135–147)
WBC # BLD AUTO: 8.4 THOUSAND/UL (ref 4.31–10.16)

## 2024-09-06 PROCEDURE — 99284 EMERGENCY DEPT VISIT MOD MDM: CPT | Performed by: EMERGENCY MEDICINE

## 2024-09-06 PROCEDURE — 87040 BLOOD CULTURE FOR BACTERIA: CPT | Performed by: EMERGENCY MEDICINE

## 2024-09-06 PROCEDURE — 83605 ASSAY OF LACTIC ACID: CPT | Performed by: EMERGENCY MEDICINE

## 2024-09-06 PROCEDURE — 36415 COLL VENOUS BLD VENIPUNCTURE: CPT | Performed by: EMERGENCY MEDICINE

## 2024-09-06 PROCEDURE — 99282 EMERGENCY DEPT VISIT SF MDM: CPT

## 2024-09-06 PROCEDURE — 85025 COMPLETE CBC W/AUTO DIFF WBC: CPT | Performed by: EMERGENCY MEDICINE

## 2024-09-06 PROCEDURE — 86140 C-REACTIVE PROTEIN: CPT | Performed by: EMERGENCY MEDICINE

## 2024-09-06 PROCEDURE — 80053 COMPREHEN METABOLIC PANEL: CPT | Performed by: EMERGENCY MEDICINE

## 2024-09-06 PROCEDURE — 84145 PROCALCITONIN (PCT): CPT | Performed by: EMERGENCY MEDICINE

## 2024-09-07 NOTE — ED PROVIDER NOTES
History  Chief Complaint   Patient presents with    Hand Swelling     Pt reports having redness in his right hand and right side of his face that has been going on since . Pt reports finishing Levaquin yesterday and feels like there is no relief.      This is a 69-year-old male who presents with redness and warmth to the right hand and right side of his face over the past 2 weeks.  He does have a history of chronic back infection and is on chronic doxycycline suppression therapy.  He was recently admitted here for sepsis treated with cefepime and discharged on Levaquin which she just finished.  Back pain is unchanged denies any fever did have chills several days ago.  Rash is not itchy or painful.      History provided by:  Patient and spouse  Medical Problem  Location:  Right hand and right side of face  Quality:  Erythema and warmth  Severity:  Moderate  Onset quality:  Gradual  Duration:  2 weeks  Timing:  Constant  Progression:  Worsening  Chronicity:  New  Context:  Right face and hand redness and warmth  Associated symptoms: rash    Associated symptoms: no fever        Prior to Admission Medications   Prescriptions Last Dose Informant Patient Reported? Taking?   ACCU-CHEK FABIANO PLUS test strip  Spouse/Significant Other Yes No   Si (four) times a day   ACCU-CHEK FASTCLIX LANCETS MISC  Spouse/Significant Other Yes No   Si (four) times a day   GNP Aspirin Low Dose 81 MG EC tablet Not Taking Spouse/Significant Other Yes No   Sig: Take 1 tablet (81 mg total) by mouth daily Do not start before 2023.   Patient not taking: Reported on 2024   Jardiance 10 MG TABS 2024 Spouse/Significant Other Yes Yes   Sig: Take 10 mg by mouth every morning   NIFEdipine (PROCARDIA XL) 30 mg 24 hr tablet   No No   Sig: Take 2 tablets (60 mg total) by mouth 2 (two) times a day   acetaminophen (TYLENOL) 500 mg tablet 2024 Spouse/Significant Other No Yes   Sig: Take 2 tablets (1,000 mg total) by  mouth every 8 (eight) hours 500 mg tablet   clonazePAM (KlonoPIN) 1 mg tablet 9/6/2024 Spouse/Significant Other Yes Yes   Sig: Take 1 mg by mouth 2 (two) times a day & 3rd pill PRN   doxycycline hyclate (VIBRAMYCIN) 100 mg capsule 9/6/2024  Yes Yes   Sig: Take 100 mg by mouth 2 (two) times a day Do not start before August 10, 2024.   escitalopram (Lexapro) 20 mg tablet 9/6/2024  Yes Yes   Sig: Take 20 mg by mouth daily. Indications: .   finasteride (PROSCAR) 5 mg tablet 9/6/2024  No Yes   Sig: TAKE 1 TABLET BY MOUTH DAILY   folic acid (FOLVITE) 1 mg tablet 9/6/2024 Spouse/Significant Other Yes Yes   Sig: Take 2,000 mcg by mouth daily   furosemide (LASIX) 20 mg tablet   No No   Sig: Take 1 tablet (20 mg total) by mouth daily as needed (for weight gain more than 3 lbs overnight or more than 5 lbs in a week, or lower extremity swelling)   gabapentin (NEURONTIN) 100 mg capsule   No No   Sig: Take 1 capsule (100 mg total) by mouth daily   hydrOXYzine pamoate (VISTARIL) 50 mg capsule 9/6/2024 Spouse/Significant Other Yes Yes   Sig: Take 50 mg by mouth 2 (two) times a day. Indications: Feeling Anxious   lidocaine (LIDODERM) 5 % 9/6/2024 Spouse/Significant Other No Yes   Sig: Apply 1 patch topically over 12 hours daily Remove & Discard patch within 12 hours or as directed by MD   lisinopril (ZESTRIL) 5 mg tablet Not Taking  No No   Sig: Take 1 tablet (5 mg total) by mouth daily   Patient not taking: Reported on 9/6/2024   lovastatin (MEVACOR) 20 mg tablet 9/6/2024 Spouse/Significant Other Yes Yes   Sig: Take 20 mg by mouth every morning   metFORMIN (GLUCOPHAGE) 1000 MG tablet 9/6/2024 Spouse/Significant Other Yes Yes   Sig: Take 1,000 mg by mouth 2 (two) times a day with meals    methocarbamol (ROBAXIN) 750 mg tablet 9/6/2024 Spouse/Significant Other No Yes   Sig: Take 1 tablet (750 mg total) by mouth every 6 (six) hours as needed for muscle spasms   metoprolol succinate (TOPROL-XL) 50 mg 24 hr tablet 9/6/2024  Spouse/Significant Other Yes Yes   Sig: Take 50 mg by mouth 2 (two) times a day   mirtazapine (REMERON) 45 MG tablet 9/6/2024 Spouse/Significant Other Yes Yes   Sig: Take 45 mg by mouth daily at bedtime   ondansetron (Zofran ODT) 4 mg disintegrating tablet  Spouse/Significant Other No No   Sig: Take 1 tablet (4 mg total) by mouth every 6 (six) hours as needed for nausea or vomiting   oxyCODONE (ROXICODONE) 5 immediate release tablet 9/6/2024  No Yes   Sig: Take 1 tablet (5 mg total) by mouth every 6 (six) hours as needed for severe pain Dr. Katie Hearn CRNP Max Daily Amount: 20 mg   patient supplied medication  Spouse/Significant Other Yes No   Sig: medical marijuana vape as needed per latest dci  Indications: .   senna (SENOKOT) 8.6 mg  Spouse/Significant Other No No   Sig: Take 1 tablet (8.6 mg total) by mouth 2 (two) times a day 2 tabs at bedtime as needed for constipation per latest dci   tamsulosin (FLOMAX) 0.4 mg 9/6/2024  No Yes   Sig: TAKE 1 CAPSULE BY MOUTH DAILY WITH DINNER      Facility-Administered Medications: None       Past Medical History:   Diagnosis Date    Anxiety     Arthritis     Cancer (HCC)     Chronic back pain     Depression     Diabetes mellitus (HCC)     Hypertension     Liver disease     Mitral valve prolapse     Peripheral neuropathy     Neuropathy    PONV (postoperative nausea and vomiting)     Thyroid disease        Past Surgical History:   Procedure Laterality Date    COLONOSCOPY      INCISION AND DRAINAGE OF WOUND Left 08/12/2022    Procedure: INCISION AND DRAINAGE (I&D) EXTREMITY;  Surgeon: Moustapha Cote DPM;  Location:  MAIN OR;  Service: Podiatry    IR BIOPSY SPINE  1/30/2024    IR BIOPSY SPINE  2/1/2024    IR PICC PLACEMENT SINGLE LUMEN  2/6/2024    JOINT REPLACEMENT Left 03/11/2022    Left TSA    NE ARTHRODESIS POSTERIOR/PSTLAT TQ 1NTRSPC LUMBAR Bilateral 04/11/2023    Procedure: L1-S1 navigated posterior decompression with instrumented fixation fusion;  Surgeon:  James Moraes MD;  Location:  MAIN OR;  Service: Neurosurgery    THYROID SURGERY      remove cancer       Family History   Problem Relation Age of Onset    No Known Problems Father     No Known Problems Mother     Colon cancer Neg Hx      I have reviewed and agree with the history as documented.    E-Cigarette/Vaping    E-Cigarette Use Former User     Comments medical marijuana - occ      E-Cigarette/Vaping Substances    Nicotine No     THC Yes     CBD Yes     Flavoring No     Other No     Unknown No      Social History     Tobacco Use    Smoking status: Former     Current packs/day: 0.00     Average packs/day: 0.3 packs/day for 5.9 years (1.5 ttl pk-yrs)     Types: Cigarettes     Start date: 1975     Quit date: 1981     Years since quittin.2    Smokeless tobacco: Never    Tobacco comments:     quit ; smokes when in pain as of 26   Vaping Use    Vaping status: Former    Substances: THC, CBD   Substance Use Topics    Alcohol use: Never    Drug use: Yes     Frequency: 7.0 times per week     Types: Marijuana     Comment: Medical Marijuana, takes for pain, daily, vape       Review of Systems   Constitutional:  Positive for chills. Negative for fever.   Musculoskeletal:  Positive for back pain.        Chronic back pain unchanged   Skin:  Positive for rash.   All other systems reviewed and are negative.      Physical Exam  Physical Exam  Vitals and nursing note reviewed.   Constitutional:       General: He is not in acute distress.     Appearance: He is not ill-appearing, toxic-appearing or diaphoretic.   HENT:      Head: Normocephalic and atraumatic.      Right Ear: External ear normal.      Left Ear: External ear normal.   Eyes:      General:         Right eye: No discharge.         Left eye: No discharge.      Extraocular Movements: Extraocular movements intact.      Pupils: Pupils are equal, round, and reactive to light.   Cardiovascular:      Rate and Rhythm: Normal rate and regular  rhythm.      Pulses: Normal pulses.      Heart sounds: No murmur heard.     No friction rub. No gallop.   Pulmonary:      Effort: No respiratory distress.      Breath sounds: No stridor. No wheezing, rhonchi or rales.   Abdominal:      Palpations: Abdomen is soft.      Tenderness: There is no abdominal tenderness.   Musculoskeletal:      Cervical back: Normal range of motion and neck supple.      Right lower leg: No edema.      Left lower leg: No edema.   Skin:     Coloration: Skin is not jaundiced.      Findings: Erythema and rash present. No bruising.      Comments: Erythema and warmth to the right side of his face and dorsal aspect of the right hand no vesicles or pustules   Neurological:      General: No focal deficit present.      Mental Status: He is alert and oriented to person, place, and time.      Cranial Nerves: No cranial nerve deficit.   Psychiatric:         Mood and Affect: Mood normal.         Behavior: Behavior normal.         Vital Signs  ED Triage Vitals [09/06/24 1759]   Temperature Pulse Respirations Blood Pressure SpO2   98.3 °F (36.8 °C) 81 20 119/74 99 %      Temp Source Heart Rate Source Patient Position - Orthostatic VS BP Location FiO2 (%)   Temporal Monitor Sitting Right arm --      Pain Score       --           Vitals:    09/06/24 1759 09/06/24 2300   BP: 119/74 139/67   Pulse: 81 84   Patient Position - Orthostatic VS: Sitting          Visual Acuity      ED Medications  Medications - No data to display    Diagnostic Studies  Results Reviewed       Procedure Component Value Units Date/Time    Procalcitonin [[id number]]  (Normal) Collected: 09/06/24 2149    Lab Status: Final result Specimen: Blood from Arm, Right Updated: 09/06/24 2223     Procalcitonin 0.07 ng/ml     Lactic acid, plasma (w/reflex if result > 2.0) [[id number]]  (Normal) Collected: 09/06/24 2149    Lab Status: Final result Specimen: Blood from Arm, Right Updated: 09/06/24 2217     LACTIC ACID 0.9 mmol/L     Narrative:       Result may be elevated if tourniquet was used during collection.    C-reactive protein [[id number]]  (Abnormal) Collected: 09/06/24 2149    Lab Status: Final result Specimen: Blood from Arm, Right Updated: 09/06/24 2217     CRP 7.4 mg/L     Comprehensive metabolic panel [[id number]] Collected: 09/06/24 2149    Lab Status: Final result Specimen: Blood from Arm, Right Updated: 09/06/24 2217     Sodium 137 mmol/L      Potassium 4.0 mmol/L      Chloride 104 mmol/L      CO2 25 mmol/L      ANION GAP 8 mmol/L      BUN 25 mg/dL      Creatinine 1.01 mg/dL      Glucose 66 mg/dL      Calcium 9.0 mg/dL      AST 32 U/L      ALT 9 U/L      Alkaline Phosphatase 71 U/L      Total Protein 6.7 g/dL      Albumin 3.9 g/dL      Total Bilirubin 0.49 mg/dL      eGFR 76 ml/min/1.73sq m     Narrative:      [employer] guidelines for Chronic Kidney Disease (CKD):     Stage 1 with normal or high GFR (GFR > 90 mL/min/1.73 square meters)    Stage 2 Mild CKD (GFR = 60-89 mL/min/1.73 square meters)    Stage 3A Moderate CKD (GFR = 45-59 mL/min/1.73 square meters)    Stage 3B Moderate CKD (GFR = 30-44 mL/min/1.73 square meters)    Stage 4 Severe CKD (GFR = 15-29 mL/min/1.73 square meters)    Stage 5 End Stage CKD (GFR <15 mL/min/1.73 square meters)  Note: GFR calculation is accurate only with a steady state creatinine    Blood culture #1 [[id number]] Collected: 09/06/24 2206    Lab Status: In process Specimen: Blood from Hand, Left Updated: 09/06/24 2211    CBC and differential [[id number]]  (Abnormal) Collected: 09/06/24 2149    Lab Status: Final result Specimen: Blood from Arm, Right Updated: 09/06/24 2158     WBC 8.40 Thousand/uL      RBC 4.09 Million/uL      Hemoglobin 10.7 g/dL      Hematocrit 33.2 %      MCV 81 fL      MCH 26.2 pg      MCHC 32.2 g/dL      RDW 18.7 %      MPV 10.0 fL      Platelets 205 Thousands/uL      nRBC 0 /100 WBCs      Segmented % 67 %      Immature Grans % 1 %      Lymphocytes % 24 %      Monocytes %  5 %      Eosinophils Relative 2 %      Basophils Relative 1 %      Absolute Neutrophils 5.75 Thousands/µL      Absolute Immature Grans 0.05 Thousand/uL      Absolute Lymphocytes 2.02 Thousands/µL      Absolute Monocytes 0.41 Thousand/µL      Eosinophils Absolute 0.13 Thousand/µL      Basophils Absolute 0.04 Thousands/µL     Blood culture #2 [[id number]] Collected: 09/06/24 2149    Lab Status: In process Specimen: Blood from Arm, Right Updated: 09/06/24 2155                   No orders to display              Procedures  Procedures         ED Course  ED Course as of 09/06/24 2341   Fri Sep 06, 2024   2309 Discussed case with infectious disease.  Patient does sit out in the sun and this is most likely photosensitivity would not change educations at this time or add antibiotics use sun protection and follow-up with his primary care physician                                 [place] 20yo+      Flowsheet Row Most Recent Value   Initial Alcohol Screen: US AUDIT-C     1. How often do you have a drink containing alcohol? 0 Filed at: 09/06/2024 1950   2. How many drinks containing alcohol do you have on a typical day you are drinking?  0 Filed at: 09/06/2024 1950   3b. FEMALE Any Age, or MALE 65+: How often do you have 4 or more drinks on one occassion? 0 Filed at: 09/06/2024 1950   Audit-C Score 0 Filed at: 09/06/2024 1950   JARON: How many times in the past year have you...    Used an illegal drug or used a prescription medication for non-medical reasons? Never Filed at: 09/06/2024 1950                      Medical Decision Making  Face and hand redness and warmth rule out cellulitis will check CBC and inflammatory markers    Amount and/or Complexity of Data Reviewed  Labs: ordered.                 Disposition  Final diagnoses:   Photosensitivity dermatitis     Time reflects when diagnosis was documented in both MDM as applicable and the Disposition within this note       Time User Action Codes Description Comment    9/6/2024  11:10 PM Moustapha Marlow [L56.8] Photosensitivity dermatitis           ED Disposition       ED Disposition   Discharge    Condition   Stable    Date/Time   Fri Sep 6, 2024 8720    Comment   Juan San discharge to home/self care.                   Follow-up Information       Follow up With Specialties Details Why Contact Info    Sabra Magaña DO Family Medicine   [address] [facility] [address]  [phone]              Discharge Medication List as of 9/6/2024 11:11 PM        CONTINUE these medications which have NOT CHANGED    Details   acetaminophen (TYLENOL) 500 mg tablet Take 2 tablets (1,000 mg total) by mouth every 8 (eight) hours 500 mg tablet, Starting Tue 5/21/2024, No Print      clonazePAM (KlonoPIN) 1 mg tablet Take 1 mg by mouth 2 (two) times a day & 3rd pill PRN, Starting Mon 10/24/2022, Historical Med      doxycycline hyclate (VIBRAMYCIN) 100 mg capsule Take 100 mg by mouth 2 (two) times a day Do not start before August 10, 2024., Starting Sat 8/10/2024, Historical Med      escitalopram (Lexapro) 20 mg tablet Take 20 mg by mouth daily. Indications: ., Starting Wed 5/29/2024, Historical Med      finasteride (PROSCAR) 5 mg tablet TAKE 1 TABLET BY MOUTH DAILY, Starting Fri 7/26/2024, Normal      folic acid (FOLVITE) 1 mg tablet Take 2,000 mcg by mouth daily, Starting Mon 12/17/2018, Historical Med      hydrOXYzine pamoate (VISTARIL) 50 mg capsule Take 50 mg by mouth 2 (two) times a day. Indications: Feeling Anxious, Historical Med      Jardiance 10 MG TABS Take 10 mg by mouth every morning, Starting Wed 7/20/2022, Historical Med      lidocaine (LIDODERM) 5 % Apply 1 patch topically over 12 hours daily Remove & Discard patch within 12 hours or as directed by MD, Starting Tue 2/27/2024, Normal      lovastatin (MEVACOR) 20 mg tablet Take 20 mg by mouth every morning, Starting Mon 12/17/2018, Historical Med      metFORMIN (GLUCOPHAGE) 1000 MG tablet Take 1,000 mg by mouth 2 (two) times a  day with meals , Starting Wed 1/2/2019, Historical Med      methocarbamol (ROBAXIN) 750 mg tablet Take 1 tablet (750 mg total) by mouth every 6 (six) hours as needed for muscle spasms, Starting Tue 5/21/2024, No Print      metoprolol succinate (TOPROL-XL) 50 mg 24 hr tablet Take 50 mg by mouth 2 (two) times a day, Starting Wed 4/3/2024, Historical Med      mirtazapine (REMERON) 45 MG tablet Take 45 mg by mouth daily at bedtime, Starting Tue 12/11/2018, Historical Med      oxyCODONE (ROXICODONE) 5 immediate release tablet Take 1 tablet (5 mg total) by mouth every 6 (six) hours as needed for severe pain Dr. Katie MELCHOR Max Daily Amount: 20 mg, Starting Tue 8/20/2024, Normal      tamsulosin (FLOMAX) 0.4 mg TAKE 1 CAPSULE BY MOUTH DAILY WITH DINNER, Starting Tue 8/13/2024, Normal      ACCU-CHEK FABIANO PLUS test strip 4 (four) times a day, Starting Mon 12/17/2018, Historical Med      ACCU-CHEK FASTCLIX LANCETS MISC 4 (four) times a day, Starting Sat 12/22/2018, Historical Med      furosemide (LASIX) 20 mg tablet Take 1 tablet (20 mg total) by mouth daily as needed (for weight gain more than 3 lbs overnight or more than 5 lbs in a week, or lower extremity swelling), Starting Mon 7/15/2024, Until Wed 8/14/2024 at 2359, Normal      gabapentin (NEURONTIN) 100 mg capsule Take 1 capsule (100 mg total) by mouth daily, Starting Mon 7/15/2024, Until Wed 8/14/2024, Normal      GNP Aspirin Low Dose 81 MG EC tablet Take 1 tablet (81 mg total) by mouth daily Do not start before April 25, 2023., Starting Tue 4/25/2023, Historical Med      lisinopril (ZESTRIL) 5 mg tablet Take 1 tablet (5 mg total) by mouth daily, Starting Wed 8/21/2024, Until Fri 9/20/2024, Normal      NIFEdipine (PROCARDIA XL) 30 mg 24 hr tablet Take 2 tablets (60 mg total) by mouth 2 (two) times a day, Starting Tue 8/20/2024, Until Thu 9/19/2024, Normal      ondansetron (Zofran ODT) 4 mg disintegrating tablet Take 1 tablet (4 mg total) by mouth every 6  (six) hours as needed for nausea or vomiting, Starting Tue 5/21/2024, Normal      patient supplied medication medical marijuana vape as needed per latest dci  Indications: ., Historical Med      senna (SENOKOT) 8.6 mg Take 1 tablet (8.6 mg total) by mouth 2 (two) times a day 2 tabs at bedtime as needed for constipation per latest dci, Starting Tue 5/21/2024, No Print             No discharge procedures on file.    PDMP Review         Value Time User    PDMP Reviewed  Yes 7/15/2024 10:16 AM Leah He MD            ED Provider  Electronically Signed by                   Moustapha Marlow DO  09/06/24 3626

## 2024-09-11 ENCOUNTER — OFFICE VISIT (OUTPATIENT)
Dept: WOUND CARE | Facility: HOSPITAL | Age: 69
End: 2024-09-11
Payer: COMMERCIAL

## 2024-09-11 VITALS
SYSTOLIC BLOOD PRESSURE: 112 MMHG | RESPIRATION RATE: 17 BRPM | TEMPERATURE: 97.9 F | DIASTOLIC BLOOD PRESSURE: 52 MMHG | HEART RATE: 80 BPM

## 2024-09-11 DIAGNOSIS — E11.42 DIABETIC PERIPHERAL NEUROPATHY (HCC): ICD-10-CM

## 2024-09-11 DIAGNOSIS — I87.311 IDIOPATHIC CHRONIC VENOUS HYPERTENSION OF RIGHT LOWER EXTREMITY WITH ULCER (HCC): Primary | ICD-10-CM

## 2024-09-11 DIAGNOSIS — L97.919 IDIOPATHIC CHRONIC VENOUS HYPERTENSION OF RIGHT LOWER EXTREMITY WITH ULCER (HCC): Primary | ICD-10-CM

## 2024-09-11 PROCEDURE — 99212 OFFICE O/P EST SF 10 MIN: CPT | Performed by: PODIATRIST

## 2024-09-11 NOTE — PROGRESS NOTES
Patient ID: Juan San is a 69 y.o. male Date of Birth 1955       Chief Complaint   Patient presents with    Follow Up Wound Care Visit     Right lower leg ulcer       Allergies:  Abilify [aripiprazole], Cephalexin, Molds & smuts, and Pregabalin    Diagnosis:  1. Idiopathic chronic venous hypertension of right lower extremity with ulcer (HCC)  -     Wound cleansing and dressings; Future  2. Diabetic peripheral neuropathy (HCC)  -     Wound cleansing and dressings; Future     Diagnosis ICD-10-CM Associated Orders   1. Idiopathic chronic venous hypertension of right lower extremity with ulcer (HCC)  I87.311 Wound cleansing and dressings    L97.919       2. Diabetic peripheral neuropathy (HCC)  E11.42 Wound cleansing and dressings           Assessment & Plan:  Venous stasis ulceration right lower leg is well epithelialized and healed, patient may discontinue all dressings, get leg and foot wet in shower, moisturize.  Patient to continue to use Tubigrip for compression during all waking hours.  He may weight-bear as tolerated.  Today reviewed proper diabetic footcare, shoe gear, daily foot inspection, frequent elevation, low-sodium diet, proper protein intake, proper glycemic control, patient will follow-up with [facility] podiatry in the next 3 to 4 weeks to establish diabetic footcare.  At this time as his wound is are well-healed he is discharged from [place] [facility] and will follow-up as needed.  Patient and wife understand and agree with the plan.    Procedures - none    Subjective:   Juan presents today for evaluation care of right lower leg venous stasis ulceration, wife has been changing dressings as directed, no new complaints.          The following portions of the patient's history were reviewed and updated as appropriate:   Patient Active Problem List   Diagnosis    Alcoholism in remission (HCC)    Benzodiazepine dependence (HCC)    Bilateral adhesive capsulitis of shoulders     Bronchitis, mucopurulent recurrent (HCC)    Cirrhosis, alcoholic (HCC)    Diabetic peripheral neuropathy (HCC)    DM (diabetes mellitus) (HCC)    Herniated nucleus pulposus of lumbosacral region    Hypercholesterolemia    Lumbar spondylosis    Thyroid cancer (HCC)    Hypertensive urgency    Liver disease    Chronic pain syndrome    Lumbar radiculopathy    Chronic bilateral low back pain without sciatica    PONV (postoperative nausea and vomiting)    Medical marijuana use    Urinary retention    Anemia    Hyponatremia    Gallstones    Status post lumbar spinal fusion    Benign prostatic hyperplasia with urinary retention    Scrotal pain    Lower urinary tract symptoms    Acute on chronic bilateral low back pain with sciatica    Anxiety and depression    Hypertension    Hypochromic microcytic anemia    Discitis of lumbosacral region    Foraminal stenosis of lumbar region    Sepsis with acute organ dysfunction (HCC)    Lactic acidosis    Type 2 diabetes mellitus with hyperglycemia, without long-term current use of insulin (HCC)    Failure of joint fusion (HCC)    Prostatitis    Lower extremity edema    Venous stasis ulcers (HCC)    LIGIA (acute kidney injury) (HCC)     Past Medical History:   Diagnosis Date    Anxiety     Arthritis     Cancer (HCC)     Chronic back pain     Depression     Diabetes mellitus (HCC)     Hypertension     Liver disease     Mitral valve prolapse     Peripheral neuropathy     Neuropathy    PONV (postoperative nausea and vomiting)     Thyroid disease      Past Surgical History:   Procedure Laterality Date    COLONOSCOPY      INCISION AND DRAINAGE OF WOUND Left 08/12/2022    Procedure: INCISION AND DRAINAGE (I&D) EXTREMITY;  Surgeon: Moustapha Cote DPM;  Location:  MAIN OR;  Service: Podiatry    IR BIOPSY SPINE  1/30/2024    IR BIOPSY SPINE  2/1/2024    IR PICC PLACEMENT SINGLE LUMEN  2/6/2024    JOINT REPLACEMENT Left 03/11/2022    Left TSA    OH ARTHRODESIS POSTERIOR/PSTLAT TQ 1NTRSPC LUMBAR  Bilateral 2023    Procedure: L1-S1 navigated posterior decompression with instrumented fixation fusion;  Surgeon: James Moraes MD;  Location:  MAIN OR;  Service: Neurosurgery    THYROID SURGERY      remove cancer     Social History     Socioeconomic History    Marital status: /Civil Union     Spouse name: Not on file    Number of children: Not on file    Years of education: Not on file    Highest education level: Not on file   Occupational History    Not on file   Tobacco Use    Smoking status: Former     Current packs/day: 0.00     Average packs/day: 0.3 packs/day for 5.9 years (1.5 ttl pk-yrs)     Types: Cigarettes     Start date: 1975     Quit date: 1981     Years since quittin.3    Smokeless tobacco: Never    Tobacco comments:     quit ; smokes when in pain as of 26   Vaping Use    Vaping status: Former    Substances: THC, CBD   Substance and Sexual Activity    Alcohol use: Never    Drug use: Yes     Frequency: 7.0 times per week     Types: Marijuana     Comment: Medical Marijuana, takes for pain, daily, vape    Sexual activity: Yes     Partners: Female     Birth control/protection: None   Other Topics Concern    Not on file   Social History Narrative    Not on file     Social Determinants of Health     Financial Resource Strain: Not on file   Food Insecurity: No Food Insecurity (2024)    Hunger Vital Sign     Worried About Running Out of Food in the Last Year: Never true     Ran Out of Food in the Last Year: Never true   Transportation Needs: No Transportation Needs (2024)    PRAPARE - Transportation     Lack of Transportation (Medical): No     Lack of Transportation (Non-Medical): No   Physical Activity: Not on file   Stress: Not on file   Social Connections: Not on file   Intimate Partner Violence: Not on file   Housing Stability: Low Risk  (2024)    Housing Stability Vital Sign     Unable to Pay for Housing in the Last Year: No     Number of Times  Moved in the Last Year: 1     Homeless in the Last Year: No        Current Outpatient Medications:     ACCU-CHEK FABIANO PLUS test strip, 4 (four) times a day, Disp: , Rfl: 1    ACCU-CHEK FASTCLIX LANCETS MISC, 4 (four) times a day, Disp: , Rfl: 1    acetaminophen (TYLENOL) 500 mg tablet, Take 2 tablets (1,000 mg total) by mouth every 8 (eight) hours 500 mg tablet, Disp: , Rfl:     clonazePAM (KlonoPIN) 1 mg tablet, Take 1 mg by mouth 2 (two) times a day & 3rd pill PRN, Disp: , Rfl:     doxycycline hyclate (VIBRAMYCIN) 100 mg capsule, Take 100 mg by mouth 2 (two) times a day Do not start before August 10, 2024., Disp: , Rfl:     escitalopram (Lexapro) 20 mg tablet, Take 20 mg by mouth daily. Indications: ., Disp: , Rfl:     finasteride (PROSCAR) 5 mg tablet, TAKE 1 TABLET BY MOUTH DAILY, Disp: 100 tablet, Rfl: 1    folic acid (FOLVITE) 1 mg tablet, Take 2,000 mcg by mouth daily, Disp: , Rfl: 6    furosemide (LASIX) 20 mg tablet, Take 1 tablet (20 mg total) by mouth daily as needed (for weight gain more than 3 lbs overnight or more than 5 lbs in a week, or lower extremity swelling), Disp: 30 tablet, Rfl: 0    gabapentin (NEURONTIN) 100 mg capsule, Take 1 capsule (100 mg total) by mouth daily, Disp: 30 capsule, Rfl: 0    GNP Aspirin Low Dose 81 MG EC tablet, Take 1 tablet (81 mg total) by mouth daily Do not start before April 25, 2023. (Patient not taking: Reported on 9/6/2024), Disp: , Rfl: 0    hydrOXYzine pamoate (VISTARIL) 50 mg capsule, Take 50 mg by mouth 2 (two) times a day. Indications: Feeling Anxious, Disp: , Rfl:     Jardiance 10 MG TABS, Take 10 mg by mouth every morning, Disp: , Rfl:     lidocaine (LIDODERM) 5 %, Apply 1 patch topically over 12 hours daily Remove & Discard patch within 12 hours or as directed by MD, Disp: 15 patch, Rfl: 0    lisinopril (ZESTRIL) 5 mg tablet, Take 1 tablet (5 mg total) by mouth daily (Patient not taking: Reported on 9/6/2024), Disp: 30 tablet, Rfl: 0    lovastatin (MEVACOR)  20 mg tablet, Take 20 mg by mouth every morning, Disp: , Rfl: 3    metFORMIN (GLUCOPHAGE) 1000 MG tablet, Take 1,000 mg by mouth 2 (two) times a day with meals , Disp: , Rfl:     methocarbamol (ROBAXIN) 750 mg tablet, Take 1 tablet (750 mg total) by mouth every 6 (six) hours as needed for muscle spasms, Disp: , Rfl:     metoprolol succinate (TOPROL-XL) 50 mg 24 hr tablet, Take 50 mg by mouth 2 (two) times a day, Disp: , Rfl:     mirtazapine (REMERON) 45 MG tablet, Take 45 mg by mouth daily at bedtime, Disp: , Rfl: 1    NIFEdipine (PROCARDIA XL) 30 mg 24 hr tablet, Take 2 tablets (60 mg total) by mouth 2 (two) times a day, Disp: 120 tablet, Rfl: 0    ondansetron (Zofran ODT) 4 mg disintegrating tablet, Take 1 tablet (4 mg total) by mouth every 6 (six) hours as needed for nausea or vomiting, Disp: 30 tablet, Rfl: 0    oxyCODONE (ROXICODONE) 5 immediate release tablet, Take 1 tablet (5 mg total) by mouth every 6 (six) hours as needed for severe pain Dr. Katie Hearn CRNP Max Daily Amount: 20 mg, Disp: 15 tablet, Rfl: 0    patient supplied medication, medical marijuana vape as needed per latest dci  Indications: ., Disp: , Rfl:     senna (SENOKOT) 8.6 mg, Take 1 tablet (8.6 mg total) by mouth 2 (two) times a day 2 tabs at bedtime as needed for constipation per latest dci, Disp: , Rfl:     tamsulosin (FLOMAX) 0.4 mg, TAKE 1 CAPSULE BY MOUTH DAILY WITH DINNER, Disp: 90 capsule, Rfl: 1  Family History   Problem Relation Age of Onset    No Known Problems Father     No Known Problems Mother     Colon cancer Neg Hx        Review of Systems   Constitutional:  Negative for chills and fever.   HENT:  Negative for ear pain and sore throat.    Eyes:  Negative for pain and visual disturbance.   Respiratory:  Negative for cough and shortness of breath.    Cardiovascular:  Negative for chest pain and palpitations.   Gastrointestinal:  Negative for abdominal pain and vomiting.   Genitourinary:  Negative for dysuria and  hematuria.   Musculoskeletal:  Positive for gait problem. Negative for arthralgias and back pain.   Skin:  Positive for color change and wound. Negative for rash.   Neurological:  Positive for numbness. Negative for seizures and syncope.   Psychiatric/Behavioral: Negative.     All other systems reviewed and are negative.        Objective:  /52   Pulse 80   Temp 97.9 °F (36.6 °C)   Resp 17     Physical Exam  Constitutional:       Appearance: Normal appearance. He is normal weight.   HENT:      Head: Normocephalic and atraumatic.      Right Ear: External ear normal.      Left Ear: External ear normal.      Nose: Nose normal.      Mouth/Throat:      Mouth: Mucous membranes are moist.      Pharynx: Oropharynx is clear.   Eyes:      Conjunctiva/sclera: Conjunctivae normal.      Pupils: Pupils are equal, round, and reactive to light.   Cardiovascular:      Pulses: Normal pulses.           Dorsalis pedis pulses are 2+ on the right side and 2+ on the left side.        Posterior tibial pulses are 2+ on the right side and 2+ on the left side.   Pulmonary:      Effort: Pulmonary effort is normal.   Musculoskeletal:      Cervical back: Normal range of motion.      Right lower leg: No edema.      Left lower leg: No edema.   Skin:     General: Skin is warm and dry.      Capillary Refill: Capillary refill takes less than 2 seconds.   Neurological:      General: No focal deficit present.      Mental Status: He is alert and oriented to person, place, and time. Mental status is at baseline.      Sensory: Sensory deficit present.      Coordination: Coordination abnormal.      Gait: Gait abnormal.      Deep Tendon Reflexes: Reflexes abnormal.   Psychiatric:         Mood and Affect: Mood normal.         Behavior: Behavior normal.         Thought Content: Thought content normal.         Judgment: Judgment normal.           [REMOVED] Wound 07/13/24 Venous Ulcer Pretibial Distal;Right (Removed)   Wound Image Images linked 09/11/24  1506   Wound Description Epithelialization 09/11/24 1506   Emely-wound Assessment Intact 09/11/24 1506   Wound Length (cm) 0 cm 09/11/24 1506   Wound Width (cm) 0 cm 09/11/24 1506   Wound Depth (cm) 0 cm 09/11/24 1506   Wound Surface Area (cm^2) 0 cm^2 09/11/24 1506   Wound Volume (cm^3) 0 cm^3 09/11/24 1506   Calculated Wound Volume (cm^3) 0 cm^3 09/11/24 1506   Change in Wound Size % 100 09/11/24 1506   Drainage Amount None 09/11/24 1506   Non-staged Wound Description Not applicable 09/11/24 1506                MRI pelvis sacrum,coccyx, si jts wo and w contrast    Result Date: 8/28/2024  Narrative: This study is being used for reference films only. There is no [facility] interpretation for this study.    MRI pelvis male wo and w contrast    Result Date: 8/19/2024  Narrative: STUDY: ENHANCED MRI OF THE PELVIS CLINICAL INFORMATION: Age/Gender: 69 years / Male History: Sepsis; prostate abscess; bacteremia. Nausea and vomiting PROCEDURE: MRI of the pelvis was performed using a 1.5 Clau magnet. Sequences include axial T1 weighted in/out of phase images, multiplanar T2 weighted images, diffusion weighted images and fat suppressed T1 weighted images obtained before and after IV gadolinium contrast administration. INTRAVENOUS CONTRAST: 7.5 mL of gadobutrol COMPARISON: 7/24/2024 MRI and MRI pelvis from [facility] on 7/1/2024. FINDINGS: PROSTATE AND SEMINAL VESICLES: The prostate measures 4.3 x 2.9 x 4.6 cm, minimally decreased in size compared with the prior MRI. There is no apparent hemorrhage within the prostate gland. Within the incisional zone, there are bilateral, heterogeneously T2 lesions, with peripheral enhancement and no central enhancement. These measure 1.9 x 1.3 x 1.7 on the right and 2.1 x 1.7 x 2.1 cm on the left. There is mild restricted diffusion within the left greater than right lesion, improved since the prior study. These have not grossly changed in size. Normal seminal vesicles. These  findings minimally changed from 7/1/2024 BLADDER: Mild to moderate diffuse urinary bladder wall thickening and trabeculation. PERITONEUM/EXTRAPERITONEUM: No ascites. LYMPH NODES: No pelvic lymphadenopathy. VISUALIZED BOWEL: Nondilated. PELVIC WALL: Unremarkable. BONES: Abnormal fluid signal is seen involving the L5-S1 level of the lumbar spine, with adjacent sclerosis/productive bony changes.     Impression: 1.  The previously described peripherally enhancing lesions within the transitional zone of the prostate gland appear minimally improved from the prior MRI, but overall minimally changed since 7/1/2024. While these are concerning for abscesses, given the  overall lack of change, this may not represent the source of sepsis. 2.  Abnormal fluid signal and productive changes at the L5-S1 level of the lumbar spine, consistent with discitis osteomyelitis. Correlation with lumbar spine MRI is recommended. Workstation performed: [id number]     XR chest 1 view portable    Result Date: 8/16/2024  Narrative: XR CHEST PORTABLE INDICATION: vomiting. COMPARISON: Chest radiograph July 13, 2024 FINDINGS: Clear lungs. No pneumothorax or pleural effusion. Normal cardiomediastinal silhouette. Partially imaged left shoulder arthroplasty. Partially imaged thoracolumbar spinal hardware. No acute osseous abnormality. Normal upper abdomen.     Impression: No acute cardiopulmonary disease. Workstation performed: [id number]     CT spine lumbar w contrast    Result Date: 8/15/2024  Narrative: CT LUMBAR SPINE WITHOUT CONTRAST INDICATION:   back pain, h/o hardware in back with infection. COMPARISON: 6/27/2024 MRI and CT. TECHNIQUE:  Contiguous axial images through the lumbar spine were obtained. Sagittal and coronal reconstructions were performed. IV Contrast: 100 mL of iohexol (OMNIPAQUE) Radiation dose length product (DLP) for this visit:  892 mGy-cm .  This examination, like all CT scans performed in the [facility], was  performed utilizing techniques to minimize radiation dose exposure, including the use of iterative reconstruction and automated exposure control. IMAGE QUALITY:  Diagnostic. FINDINGS: ALIGNMENT:  There are 5 lumbar type vertebral bodies. The patient is status post spinal fusion from the level of T12-S1. There is retrolisthesis of L5-S1. There is lucency again noted surrounding the S1 pedicle screws consistent with hardware loosening. VERTEBRAE: There are stable findings of chronic discitis osteomyelitis at L5-S1 as well as to a lesser extent at L1-L2. There is also endplate irregularity noted at L2-L3 and L4-L5, stable. There are no new areas of discitis osteomyelitis identified. There is a heterogeneous appearance of the bones. DEGENERATIVE CHANGES: Lower Thoracic spine: Normal lower thoracic disc spaces. Lumbar Spine: There are degenerative changes as described on the prior imaging. PARASPINAL SOFT TISSUES: No discrete paraspinal collections are identified. Streak artifact limits evaluation for evaluation of a collection within the spinal canal. OTHER: Unremarkable     Impression: No significant change since prior examination from 6/27/2024. No new areas of discitis osteomyelitis identified. Stable chronic discitis osteomyelitis at L5-S1 and lesser extent L1-L2. Endplate irregularity again noted at L2-L3 and L4-L5, stable. No discrete paraspinal collections are seen. Limited evaluation for a collection within the spinal canal secondary to streak artifact from orthopedic hardware. Workstation performed: [id number]       Wound Instructions:  Orders Placed This Encounter   Procedures    Wound cleansing and dressings     Your wound is healed. Continue good daily skin care. Moisturize daily  Follow up with Dr Ward at her office in 3-4 weeks  Call wound care center immediately if wound re-opens   Thank you for choosing [facility] wound care center     Standing Status:   Future     Standing Expiration Date:    "9/11/2024         Yoly Ward, GARRICK, DPM, FACFAS    Portions of the record may have been created with voice recognition software. Occasional wrong word or \"sound a like\" substitutions may have occurred due to the inherent limitations of voice recognition software. Read the chart carefully and recognize, using context, where substitutions have occurred.    "

## 2024-09-11 NOTE — PATIENT INSTRUCTIONS
Orders Placed This Encounter   Procedures    Wound cleansing and dressings     Your wound is healed. Continue good daily skin care. Moisturize daily  Follow up with Dr Ward at her office in 3-4 weeks  Call wound care center immediately if wound re-opens   Thank you for choosing [facility] wound care center     Standing Status:   Future     Standing Expiration Date:   9/11/2024

## 2024-09-12 LAB
BACTERIA BLD CULT: NORMAL
BACTERIA BLD CULT: NORMAL

## 2024-09-14 PROBLEM — R65.20 SEPSIS WITH ACUTE ORGAN DYSFUNCTION (HCC): Status: RESOLVED | Noted: 2024-05-19 | Resolved: 2024-09-14

## 2024-09-14 PROBLEM — A41.9 SEPSIS WITH ACUTE ORGAN DYSFUNCTION (HCC): Status: RESOLVED | Noted: 2024-05-19 | Resolved: 2024-09-14

## 2024-09-20 ENCOUNTER — HOSPITAL ENCOUNTER (EMERGENCY)
Facility: HOSPITAL | Age: 69
Discharge: HOME | End: 2024-09-20
Attending: EMERGENCY MEDICINE
Payer: COMMERCIAL

## 2024-09-20 ENCOUNTER — APPOINTMENT (EMERGENCY)
Dept: CT IMAGING | Facility: HOSPITAL | Age: 69
End: 2024-09-20
Attending: EMERGENCY MEDICINE
Payer: COMMERCIAL

## 2024-09-20 ENCOUNTER — APPOINTMENT (EMERGENCY)
Dept: RADIOLOGY | Facility: HOSPITAL | Age: 69
End: 2024-09-20
Payer: COMMERCIAL

## 2024-09-20 VITALS
RESPIRATION RATE: 18 BRPM | DIASTOLIC BLOOD PRESSURE: 67 MMHG | TEMPERATURE: 98 F | HEART RATE: 67 BPM | OXYGEN SATURATION: 99 % | SYSTOLIC BLOOD PRESSURE: 144 MMHG

## 2024-09-20 DIAGNOSIS — M54.9 BACK PAIN: ICD-10-CM
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
## 2024-09-20 LAB
ANION GAP SERPL CALCULATED.3IONS-SCNC: 11 MMOL/L (ref 4–13)
ATRIAL RATE: 73 BPM
BASOPHILS # BLD AUTO: 0.03 THOUSANDS/ΜL (ref 0–0.1)
BASOPHILS NFR BLD AUTO: 0 % (ref 0–1)
BUN SERPL-MCNC: 10 MG/DL (ref 5–25)
CALCIUM SERPL-MCNC: 9.5 MG/DL (ref 8.4–10.2)
CHLORIDE SERPL-SCNC: 100 MMOL/L (ref 96–108)
CO2 SERPL-SCNC: 29 MMOL/L (ref 21–32)
CREAT SERPL-MCNC: 0.87 MG/DL (ref 0.6–1.3)
EOSINOPHIL # BLD AUTO: 0.06 THOUSAND/ΜL (ref 0–0.61)
EOSINOPHIL NFR BLD AUTO: 1 % (ref 0–6)
ERYTHROCYTE [DISTWIDTH] IN BLOOD BY AUTOMATED COUNT: 18.8 % (ref 11.6–15.1)
GFR SERPL CREATININE-BSD FRML MDRD: 89 ML/MIN/1.73SQ M
GLUCOSE SERPL-MCNC: 141 MG/DL (ref 65–140)
HCT VFR BLD AUTO: 37.7 % (ref 36.5–49.3)
HGB BLD-MCNC: 12 G/DL (ref 12–17)
IMM GRANULOCYTES # BLD AUTO: 0.03 THOUSAND/UL (ref 0–0.2)
IMM GRANULOCYTES NFR BLD AUTO: 0 % (ref 0–2)
LYMPHOCYTES # BLD AUTO: 1.42 THOUSANDS/ΜL (ref 0.6–4.47)
LYMPHOCYTES NFR BLD AUTO: 21 % (ref 14–44)
MCH RBC QN AUTO: 26.3 PG (ref 26.8–34.3)
MCHC RBC AUTO-ENTMCNC: 31.8 G/DL (ref 31.4–37.4)
MCV RBC AUTO: 83 FL (ref 82–98)
MONOCYTES # BLD AUTO: 0.59 THOUSAND/ΜL (ref 0.17–1.22)
MONOCYTES NFR BLD AUTO: 9 % (ref 4–12)
NEUTROPHILS # BLD AUTO: 4.68 THOUSANDS/ΜL (ref 1.85–7.62)
NEUTS SEG NFR BLD AUTO: 69 % (ref 43–75)
NRBC BLD AUTO-RTO: 0 /100 WBCS
P AXIS: 46 DEGREES
PLATELET # BLD AUTO: 345 THOUSANDS/UL (ref 149–390)
PMV BLD AUTO: 10.2 FL (ref 8.9–12.7)
POTASSIUM SERPL-SCNC: 3.7 MMOL/L (ref 3.5–5.3)
PR INTERVAL: 134 MS
QRS AXIS: 69 DEGREES
QRSD INTERVAL: 76 MS
QT INTERVAL: 406 MS
QTC INTERVAL: 447 MS
RBC # BLD AUTO: 4.57 MILLION/UL (ref 3.88–5.62)
SODIUM SERPL-SCNC: 140 MMOL/L (ref 135–147)
T WAVE AXIS: 70 DEGREES
VENTRICULAR RATE: 73 BPM
WBC # BLD AUTO: 6.81 THOUSAND/UL (ref 4.31–10.16)

## 2024-09-20 PROCEDURE — 80048 BASIC METABOLIC PNL TOTAL CA: CPT | Performed by: EMERGENCY MEDICINE

## 2024-09-20 PROCEDURE — 93010 ELECTROCARDIOGRAM REPORT: CPT | Performed by: INTERNAL MEDICINE

## 2024-09-20 PROCEDURE — 96374 THER/PROPH/DIAG INJ IV PUSH: CPT

## 2024-09-20 PROCEDURE — 96376 TX/PRO/DX INJ SAME DRUG ADON: CPT

## 2024-09-20 PROCEDURE — 36415 COLL VENOUS BLD VENIPUNCTURE: CPT | Performed by: EMERGENCY MEDICINE

## 2024-09-20 PROCEDURE — 85025 COMPLETE CBC W/AUTO DIFF WBC: CPT | Performed by: EMERGENCY MEDICINE

## 2024-09-20 PROCEDURE — 73030 X-RAY EXAM OF SHOULDER: CPT

## 2024-09-20 PROCEDURE — 99285 EMERGENCY DEPT VISIT HI MDM: CPT

## 2024-09-20 PROCEDURE — 93005 ELECTROCARDIOGRAM TRACING: CPT

## 2024-09-20 PROCEDURE — 99284 EMERGENCY DEPT VISIT MOD MDM: CPT | Performed by: EMERGENCY MEDICINE

## 2024-09-20 PROCEDURE — 72131 CT LUMBAR SPINE W/O DYE: CPT

## 2024-09-20 RX ORDER — HYDROMORPHONE HCL/PF 1 MG/ML
1 SYRINGE (ML) INJECTION ONCE
Status: COMPLETED | OUTPATIENT
Start: 2024-09-20 | End: 2024-09-20

## 2024-09-20 RX ORDER — HYDROMORPHONE HCL/PF 1 MG/ML
0.5 SYRINGE (ML) INJECTION ONCE
Status: COMPLETED | OUTPATIENT
Start: 2024-09-20 | End: 2024-09-20

## 2024-09-20 RX ADMIN — HYDROMORPHONE HYDROCHLORIDE 1 MG: 1 INJECTION, SOLUTION INTRAMUSCULAR; INTRAVENOUS; SUBCUTANEOUS at 13:34

## 2024-09-20 RX ADMIN — HYDROMORPHONE HYDROCHLORIDE 0.5 MG: 1 INJECTION, SOLUTION INTRAMUSCULAR; INTRAVENOUS; SUBCUTANEOUS at 12:05

## 2024-09-20 NOTE — ED PROVIDER NOTES
1. Fall, initial encounter    2. Back pain      ED Disposition       ED Disposition   Discharge    Condition   Stable    Date/Time   Fri Sep 20, 2024  3:00 PM    Comment   Juan San discharge to home/self care.                   Assessment & Plan       Medical Decision Making  Amount and/or Complexity of Data Reviewed  Labs: ordered.  Radiology: ordered.    Risk  Prescription drug management.      69 yom with known lumbar osteomyelitis presenting with 2 low mechanism mechanical falls today and yesterday. Obtain XR shoulder, CT lumbar spine and labs. Symptom control.                 Medications   HYDROmorphone (DILAUDID) injection 0.5 mg (0.5 mg Intravenous Given 9/20/24 1205)   HYDROmorphone (DILAUDID) injection 1 mg (1 mg Intravenous Given 9/20/24 1334)       History of Present Illness       HPI    69 yom with b/l low back and left shoulder pain after 2 low mechanism mechanical falls. Attempting to get off couch and chronic back pain caused him to fall onto his butt. Reports constant, chronic low back pain and he is scheduled with neurosx at [facility] for revision of prior surgery, currently on doxy for known osteomyelitis of lumbar spine. No chest pain, SOB, syncope, NVD.     Review of Systems   Musculoskeletal:  Positive for arthralgias and back pain.           Objective     ED Triage Vitals   Temperature Pulse Blood Pressure Respirations SpO2 Patient Position - Orthostatic VS   09/20/24 1100 09/20/24 1105 09/20/24 1105 09/20/24 1100 09/20/24 1100 09/20/24 1100   98 °F (36.7 °C) (!) 110 135/85 18 99 % Sitting      Temp Source Heart Rate Source BP Location FiO2 (%) Pain Score    09/20/24 1100 09/20/24 1100 09/20/24 1100 -- 09/20/24 1205    Temporal Monitor Right arm  10 - Worst Possible Pain        Physical Exam  Vitals and nursing note reviewed.   Constitutional:       General: He is not in acute distress.     Appearance: He is well-developed.   HENT:      Head: Normocephalic and atraumatic.      Right Ear:  External ear normal.      Left Ear: External ear normal.      Nose: Nose normal.   Eyes:      General: No scleral icterus.  Pulmonary:      Effort: Pulmonary effort is normal. No respiratory distress.   Abdominal:      General: There is no distension.      Palpations: Abdomen is soft.      Tenderness: There is no abdominal tenderness.   Musculoskeletal:         General: Tenderness present. No deformity. Normal range of motion.      Cervical back: Normal range of motion and neck supple.      Comments: TTP lumbar spine midline and paraspinal musculature. No evidence of saddle anesthesia., 5/5 strength b/l UE and LE   Skin:     General: Skin is warm.      Findings: No rash.   Neurological:      General: No focal deficit present.      Mental Status: He is alert.      Gait: Gait normal.   Psychiatric:         Mood and Affect: Mood normal.         Labs Reviewed   CBC AND DIFFERENTIAL - Abnormal       Result Value    WBC 6.81      RBC 4.57      Hemoglobin 12.0      Hematocrit 37.7      MCV 83      MCH 26.3 (*)     MCHC 31.8      RDW 18.8 (*)     MPV 10.2      Platelets 345      nRBC 0      Segmented % 69      Immature Grans % 0      Lymphocytes % 21      Monocytes % 9      Eosinophils Relative 1      Basophils Relative 0      Absolute Neutrophils 4.68      Absolute Immature Grans 0.03      Absolute Lymphocytes 1.42      Absolute Monocytes 0.59      Eosinophils Absolute 0.06      Basophils Absolute 0.03     BASIC METABOLIC PANEL - Abnormal    Sodium 140      Potassium 3.7      Chloride 100      CO2 29      ANION GAP 11      BUN 10      Creatinine 0.87      Glucose 141 (*)     Calcium 9.5      eGFR 89      Narrative:     National Kid[employer] Foundation guidelines for Chronic Kidney Disease (CKD):     Stage 1 with normal or high GFR (GFR > 90 mL/min/1.73 square meters)    Stage 2 Mild CKD (GFR = 60-89 mL/min/1.73 square meters)    Stage 3A Moderate CKD (GFR = 45-59 mL/min/1.73 square meters)    Stage 3B Moderate CKD (GFR =  30-44 mL/min/1.73 square meters)    Stage 4 Severe CKD (GFR = 15-29 mL/min/1.73 square meters)    Stage 5 End Stage CKD (GFR <15 mL/min/1.73 square meters)  Note: GFR calculation is accurate only with a steady state creatinine     XR shoulder 2+ views LEFT   Final Interpretation by Maggie Young MD ( [id number])   Technically limited study, as above.         Computerized Assisted Algorithm (CAA) may have been used to analyze all applicable images.         Workstation performed: [id number]         CT spine lumbar without contrast   Final Interpretation by Paresh Estrada MD ( [id number])      No acute fractures of the lumbar spine as clinically questioned.      Stable findings of chronic multilevel discitis osteomyelitis as described above and most notable at L5-S1. Epidural soft tissue at this level may be on the basis of scar and/or chronic phlegmon in a similar prior examinations dating back to at least    2024.      Stable lucency surrounding the S1 screws consistent with hardware loosening.      Workstation performed: [id number]             Procedures    ED Medication and Procedure Management   Prior to Admission Medications   Prescriptions Last Dose Informant Patient Reported? Taking?   ACCU-CHEK FABIANO PLUS test strip  Spouse/Significant Other Yes No   Si (four) times a day   ACCU-CHEK FASTCLIX LANCETS MISC  Spouse/Significant Other Yes No   Si (four) times a day   GNP Aspirin Low Dose 81 MG EC tablet  Spouse/Significant Other Yes No   Sig: Take 1 tablet (81 mg total) by mouth daily Do not start before 2023.   Patient not taking: Reported on 2024   Jardiance 10 MG TABS  Spouse/Significant Other Yes No   Sig: Take 10 mg by mouth every morning   NIFEdipine (PROCARDIA XL) 30 mg 24 hr tablet   No No   Sig: Take 2 tablets (60 mg total) by mouth 2 (two) times a day   acetaminophen (TYLENOL) 500 mg tablet  Spouse/Significant Other No No   Sig: Take 2 tablets (1,000 mg total) by mouth every 8  (eight) hours 500 mg tablet   clonazePAM (KlonoPIN) 1 mg tablet  Spouse/Significant Other Yes No   Sig: Take 1 mg by mouth 2 (two) times a day & 3rd pill PRN   doxycycline hyclate (VIBRAMYCIN) 100 mg capsule   Yes No   Sig: Take 100 mg by mouth 2 (two) times a day Do not start before August 10, 2024.   escitalopram (Lexapro) 20 mg tablet   Yes No   Sig: Take 20 mg by mouth daily. Indications: .   finasteride (PROSCAR) 5 mg tablet   No No   Sig: TAKE 1 TABLET BY MOUTH DAILY   folic acid (FOLVITE) 1 mg tablet  Spouse/Significant Other Yes No   Sig: Take 2,000 mcg by mouth daily   furosemide (LASIX) 20 mg tablet   No No   Sig: Take 1 tablet (20 mg total) by mouth daily as needed (for weight gain more than 3 lbs overnight or more than 5 lbs in a week, or lower extremity swelling)   gabapentin (NEURONTIN) 100 mg capsule   No No   Sig: Take 1 capsule (100 mg total) by mouth daily   hydrOXYzine pamoate (VISTARIL) 50 mg capsule  Spouse/Significant Other Yes No   Sig: Take 50 mg by mouth 2 (two) times a day. Indications: Feeling Anxious   lidocaine (LIDODERM) 5 %  Spouse/Significant Other No No   Sig: Apply 1 patch topically over 12 hours daily Remove & Discard patch within 12 hours or as directed by MD   lisinopril (ZESTRIL) 5 mg tablet   No No   Sig: Take 1 tablet (5 mg total) by mouth daily   Patient not taking: Reported on 9/6/2024   lovastatin (MEVACOR) 20 mg tablet  Spouse/Significant Other Yes No   Sig: Take 20 mg by mouth every morning   metFORMIN (GLUCOPHAGE) 1000 MG tablet  Spouse/Significant Other Yes No   Sig: Take 1,000 mg by mouth 2 (two) times a day with meals    methocarbamol (ROBAXIN) 750 mg tablet  Spouse/Significant Other No No   Sig: Take 1 tablet (750 mg total) by mouth every 6 (six) hours as needed for muscle spasms   metoprolol succinate (TOPROL-XL) 50 mg 24 hr tablet  Spouse/Significant Other Yes No   Sig: Take 50 mg by mouth 2 (two) times a day   mirtazapine (REMERON) 45 MG tablet  Spouse/Significant  Other Yes No   Sig: Take 45 mg by mouth daily at bedtime   ondansetron (Zofran ODT) 4 mg disintegrating tablet  Spouse/Significant Other No No   Sig: Take 1 tablet (4 mg total) by mouth every 6 (six) hours as needed for nausea or vomiting   oxyCODONE (ROXICODONE) 5 immediate release tablet   No No   Sig: Take 1 tablet (5 mg total) by mouth every 6 (six) hours as needed for severe pain Dr. Katie Hearn CRNP Max Daily Amount: 20 mg   patient supplied medication  Spouse/Significant Other Yes No   Sig: medical marijuana vape as needed per latest dci  Indications: .   senna (SENOKOT) 8.6 mg  Spouse/Significant Other No No   Sig: Take 1 tablet (8.6 mg total) by mouth 2 (two) times a day 2 tabs at bedtime as needed for constipation per latest dci   tamsulosin (FLOMAX) 0.4 mg   No No   Sig: TAKE 1 CAPSULE BY MOUTH DAILY WITH DINNER      Facility-Administered Medications: None     Discharge Medication List as of 9/20/2024  3:00 PM        CONTINUE these medications which have NOT CHANGED    Details   ACCU-CHEK FABIANO PLUS test strip 4 (four) times a day, Starting Mon 12/17/2018, Historical Med      ACCU-CHEK FASTCLIX LANCETS MISC 4 (four) times a day, Starting Sat 12/22/2018, Historical Med      acetaminophen (TYLENOL) 500 mg tablet Take 2 tablets (1,000 mg total) by mouth every 8 (eight) hours 500 mg tablet, Starting Tue 5/21/2024, No Print      clonazePAM (KlonoPIN) 1 mg tablet Take 1 mg by mouth 2 (two) times a day & 3rd pill PRN, Starting Mon 10/24/2022, Historical Med      doxycycline hyclate (VIBRAMYCIN) 100 mg capsule Take 100 mg by mouth 2 (two) times a day Do not start before August 10, 2024., Starting Sat 8/10/2024, Historical Med      escitalopram (Lexapro) 20 mg tablet Take 20 mg by mouth daily. Indications: ., Starting Wed 5/29/2024, Historical Med      finasteride (PROSCAR) 5 mg tablet TAKE 1 TABLET BY MOUTH DAILY, Starting Fri 7/26/2024, Normal      folic acid (FOLVITE) 1 mg tablet Take 2,000 mcg by mouth  daily, Starting Mon 12/17/2018, Historical Med      furosemide (LASIX) 20 mg tablet Take 1 tablet (20 mg total) by mouth daily as needed (for weight gain more than 3 lbs overnight or more than 5 lbs in a week, or lower extremity swelling), Starting Mon 7/15/2024, Until Wed 8/14/2024 at 2359, Normal      gabapentin (NEURONTIN) 100 mg capsule Take 1 capsule (100 mg total) by mouth daily, Starting Mon 7/15/2024, Until Wed 8/14/2024, Normal      GNP Aspirin Low Dose 81 MG EC tablet Take 1 tablet (81 mg total) by mouth daily Do not start before April 25, 2023., Starting Tue 4/25/2023, Historical Med      hydrOXYzine pamoate (VISTARIL) 50 mg capsule Take 50 mg by mouth 2 (two) times a day. Indications: Feeling Anxious, Historical Med      Jardiance 10 MG TABS Take 10 mg by mouth every morning, Starting Wed 7/20/2022, Historical Med      lidocaine (LIDODERM) 5 % Apply 1 patch topically over 12 hours daily Remove & Discard patch within 12 hours or as directed by MD, Starting Tue 2/27/2024, Normal      lisinopril (ZESTRIL) 5 mg tablet Take 1 tablet (5 mg total) by mouth daily, Starting Wed 8/21/2024, Until Fri 9/20/2024, Normal      lovastatin (MEVACOR) 20 mg tablet Take 20 mg by mouth every morning, Starting Mon 12/17/2018, Historical Med      metFORMIN (GLUCOPHAGE) 1000 MG tablet Take 1,000 mg by mouth 2 (two) times a day with meals , Starting Wed 1/2/2019, Historical Med      methocarbamol (ROBAXIN) 750 mg tablet Take 1 tablet (750 mg total) by mouth every 6 (six) hours as needed for muscle spasms, Starting Tue 5/21/2024, No Print      metoprolol succinate (TOPROL-XL) 50 mg 24 hr tablet Take 50 mg by mouth 2 (two) times a day, Starting Wed 4/3/2024, Historical Med      mirtazapine (REMERON) 45 MG tablet Take 45 mg by mouth daily at bedtime, Starting Tue 12/11/2018, Historical Med      NIFEdipine (PROCARDIA XL) 30 mg 24 hr tablet Take 2 tablets (60 mg total) by mouth 2 (two) times a day, Starting Tue 8/20/2024, Until Thu  9/19/2024, Normal      ondansetron (Zofran ODT) 4 mg disintegrating tablet Take 1 tablet (4 mg total) by mouth every 6 (six) hours as needed for nausea or vomiting, Starting Tue 5/21/2024, Normal      oxyCODONE (ROXICODONE) 5 immediate release tablet Take 1 tablet (5 mg total) by mouth every 6 (six) hours as needed for severe pain Dr. Katie Hearn CRNP Max Daily Amount: 20 mg, Starting Tue 8/20/2024, Normal      patient supplied medication medical marijuana vape as needed per latest dci  Indications: ., Historical Med      senna (SENOKOT) 8.6 mg Take 1 tablet (8.6 mg total) by mouth 2 (two) times a day 2 tabs at bedtime as needed for constipation per latest dci, Starting Tue 5/21/2024, No Print      tamsulosin (FLOMAX) 0.4 mg TAKE 1 CAPSULE BY MOUTH DAILY WITH DINNER, Starting Tue 8/13/2024, Normal           No discharge procedures on file.     Willian Perry,   09/20/24 1939

## 2024-10-06 ENCOUNTER — APPOINTMENT (OUTPATIENT)
Dept: RADIOLOGY | Facility: HOSPITAL | Age: 69
DRG: 092 | End: 2024-10-06
Payer: MEDICARE

## 2024-10-06 ENCOUNTER — APPOINTMENT (EMERGENCY)
Dept: RADIOLOGY | Facility: HOSPITAL | Age: 69
DRG: 092 | End: 2024-10-06
Payer: MEDICARE

## 2024-10-06 ENCOUNTER — APPOINTMENT (EMERGENCY)
Dept: CT IMAGING | Facility: HOSPITAL | Age: 69
DRG: 092 | End: 2024-10-06
Payer: MEDICARE

## 2024-10-06 ENCOUNTER — APPOINTMENT (OUTPATIENT)
Dept: CT IMAGING | Facility: HOSPITAL | Age: 69
DRG: 092 | End: 2024-10-06
Payer: MEDICARE

## 2024-10-06 ENCOUNTER — HOSPITAL ENCOUNTER (INPATIENT)
Facility: HOSPITAL | Age: 69
LOS: 1 days | Discharge: HOME HEALTH | DRG: 092 | End: 2024-10-08
Attending: EMERGENCY MEDICINE | Admitting: STUDENT IN AN ORGANIZED HEALTH CARE EDUCATION/TRAINING PROGRAM
Payer: MEDICARE

## 2024-10-06 DIAGNOSIS — R11.2 NAUSEA AND VOMITING: ICD-10-CM
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
## 2024-10-06 DIAGNOSIS — M25.552 BILATERAL HIP PAIN: ICD-10-CM

## 2024-10-06 DIAGNOSIS — M25.551 BILATERAL HIP PAIN: ICD-10-CM

## 2024-10-06 DIAGNOSIS — M54.50 LOW BACK PAIN: Primary | ICD-10-CM

## 2024-10-06 DIAGNOSIS — R79.89 ELEVATED TROPONIN: ICD-10-CM

## 2024-10-06 PROBLEM — R26.2 AMBULATORY DYSFUNCTION: Status: ACTIVE | Noted: 2024-10-06

## 2024-10-06 PROBLEM — I16.1 HYPERTENSIVE EMERGENCY: Status: ACTIVE | Noted: 2022-08-09

## 2024-10-06 PROBLEM — E87.6 HYPOKALEMIA: Status: ACTIVE | Noted: 2024-10-06

## 2024-10-06 PROBLEM — R35.0 BENIGN PROSTATIC HYPERPLASIA WITH URINARY FREQUENCY: Status: ACTIVE | Noted: 2023-08-29

## 2024-10-06 PROBLEM — R65.10 SIRS (SYSTEMIC INFLAMMATORY RESPONSE SYNDROME) (HCC): Status: ACTIVE | Noted: 2024-10-06

## 2024-10-06 PROBLEM — E87.29 METABOLIC ACIDOSIS, INCREASED ANION GAP: Status: ACTIVE | Noted: 2024-10-06

## 2024-10-06 LAB
2HR DELTA HS TROPONIN: -6 NG/L
4HR DELTA HS TROPONIN: 8 NG/L
ALBUMIN SERPL BCG-MCNC: 4.3 G/DL (ref 3.5–5)
ALP SERPL-CCNC: 83 U/L (ref 34–104)
ALT SERPL W P-5'-P-CCNC: 11 U/L (ref 7–52)
ANION GAP SERPL CALCULATED.3IONS-SCNC: 13 MMOL/L (ref 4–13)
ANION GAP SERPL CALCULATED.3IONS-SCNC: 19 MMOL/L (ref 4–13)
AST SERPL W P-5'-P-CCNC: 17 U/L (ref 13–39)
B-OH-BUTYR SERPL-MCNC: 2.04 MMOL/L (ref 0.02–0.27)
BASE EX.OXY STD BLDV CALC-SCNC: 77.3 % (ref 60–80)
BASE EXCESS BLDV CALC-SCNC: 2.7 MMOL/L
BASOPHILS # BLD MANUAL: 0 THOUSAND/UL (ref 0–0.1)
BASOPHILS NFR MAR MANUAL: 0 % (ref 0–1)
BILIRUB SERPL-MCNC: 0.41 MG/DL (ref 0.2–1)
BUN SERPL-MCNC: 10 MG/DL (ref 5–25)
BUN SERPL-MCNC: 9 MG/DL (ref 5–25)
CALCIUM SERPL-MCNC: 9.1 MG/DL (ref 8.4–10.2)
CALCIUM SERPL-MCNC: 9.9 MG/DL (ref 8.4–10.2)
CARDIAC TROPONIN I PNL SERPL HS: 129 NG/L
CARDIAC TROPONIN I PNL SERPL HS: 135 NG/L
CARDIAC TROPONIN I PNL SERPL HS: 143 NG/L
CHLORIDE SERPL-SCNC: 93 MMOL/L (ref 96–108)
CHLORIDE SERPL-SCNC: 96 MMOL/L (ref 96–108)
CK SERPL-CCNC: 54 U/L (ref 39–308)
CO2 SERPL-SCNC: 26 MMOL/L (ref 21–32)
CO2 SERPL-SCNC: 28 MMOL/L (ref 21–32)
CREAT SERPL-MCNC: 0.68 MG/DL (ref 0.6–1.3)
CREAT SERPL-MCNC: 0.76 MG/DL (ref 0.6–1.3)
CRP SERPL QL: 31.7 MG/L
EOSINOPHIL # BLD MANUAL: 0 THOUSAND/UL (ref 0–0.4)
EOSINOPHIL NFR BLD MANUAL: 0 % (ref 0–6)
ERYTHROCYTE [DISTWIDTH] IN BLOOD BY AUTOMATED COUNT: 18.5 % (ref 11.6–15.1)
ERYTHROCYTE [SEDIMENTATION RATE] IN BLOOD: 62 MM/HOUR (ref 0–19)
GFR SERPL CREATININE-BSD FRML MDRD: 94 ML/MIN/1.73SQ M
GFR SERPL CREATININE-BSD FRML MDRD: 98 ML/MIN/1.73SQ M
GLUCOSE P FAST SERPL-MCNC: 146 MG/DL (ref 65–99)
GLUCOSE SERPL-MCNC: 131 MG/DL (ref 65–140)
GLUCOSE SERPL-MCNC: 146 MG/DL (ref 65–140)
GLUCOSE SERPL-MCNC: 149 MG/DL (ref 65–140)
GLUCOSE SERPL-MCNC: 208 MG/DL (ref 65–140)
HCO3 BLDV-SCNC: 27.6 MMOL/L (ref 24–30)
HCT VFR BLD AUTO: 37.2 % (ref 36.5–49.3)
HGB BLD-MCNC: 11.8 G/DL (ref 12–17)
LACTATE SERPL-SCNC: 1.1 MMOL/L (ref 0.5–2)
LIPASE SERPL-CCNC: <6 U/L (ref 11–82)
LYMPHOCYTES # BLD AUTO: 0.6 THOUSAND/UL (ref 0.6–4.47)
LYMPHOCYTES # BLD AUTO: 5 % (ref 14–44)
MAGNESIUM SERPL-MCNC: 1.2 MG/DL (ref 1.9–2.7)
MCH RBC QN AUTO: 26.2 PG (ref 26.8–34.3)
MCHC RBC AUTO-ENTMCNC: 31.7 G/DL (ref 31.4–37.4)
MCV RBC AUTO: 83 FL (ref 82–98)
MONOCYTES # BLD AUTO: 0.24 THOUSAND/UL (ref 0–1.22)
MONOCYTES NFR BLD: 2 % (ref 4–12)
NEUTROPHILS # BLD MANUAL: 11.16 THOUSAND/UL (ref 1.85–7.62)
NEUTS SEG NFR BLD AUTO: 93 % (ref 43–75)
O2 CT BLDV-SCNC: 12.8 ML/DL
PCO2 BLDV: 43.5 MM HG (ref 42–50)
PH BLDV: 7.42 [PH] (ref 7.3–7.4)
PLATELET # BLD AUTO: 428 THOUSANDS/UL (ref 149–390)
PLATELET BLD QL SMEAR: ADEQUATE
PMV BLD AUTO: 10.1 FL (ref 8.9–12.7)
PO2 BLDV: 43.5 MM HG (ref 35–45)
POTASSIUM SERPL-SCNC: 3.4 MMOL/L (ref 3.5–5.3)
POTASSIUM SERPL-SCNC: 3.5 MMOL/L (ref 3.5–5.3)
PROT SERPL-MCNC: 7.8 G/DL (ref 6.4–8.4)
RBC # BLD AUTO: 4.5 MILLION/UL (ref 3.88–5.62)
RBC MORPH BLD: NORMAL
SODIUM SERPL-SCNC: 137 MMOL/L (ref 135–147)
SODIUM SERPL-SCNC: 138 MMOL/L (ref 135–147)
WBC # BLD AUTO: 12 THOUSAND/UL (ref 4.31–10.16)

## 2024-10-06 PROCEDURE — 83605 ASSAY OF LACTIC ACID: CPT | Performed by: PHYSICIAN ASSISTANT

## 2024-10-06 PROCEDURE — 99223 1ST HOSP IP/OBS HIGH 75: CPT | Performed by: STUDENT IN AN ORGANIZED HEALTH CARE EDUCATION/TRAINING PROGRAM

## 2024-10-06 PROCEDURE — 83036 HEMOGLOBIN GLYCOSYLATED A1C: CPT | Performed by: PHYSICIAN ASSISTANT

## 2024-10-06 PROCEDURE — 99285 EMERGENCY DEPT VISIT HI MDM: CPT

## 2024-10-06 PROCEDURE — 82948 REAGENT STRIP/BLOOD GLUCOSE: CPT

## 2024-10-06 PROCEDURE — 84484 ASSAY OF TROPONIN QUANT: CPT

## 2024-10-06 PROCEDURE — 96375 TX/PRO/DX INJ NEW DRUG ADDON: CPT

## 2024-10-06 PROCEDURE — 80048 BASIC METABOLIC PNL TOTAL CA: CPT | Performed by: PHYSICIAN ASSISTANT

## 2024-10-06 PROCEDURE — 82805 BLOOD GASES W/O2 SATURATION: CPT | Performed by: PHYSICIAN ASSISTANT

## 2024-10-06 PROCEDURE — 36415 COLL VENOUS BLD VENIPUNCTURE: CPT

## 2024-10-06 PROCEDURE — 71045 X-RAY EXAM CHEST 1 VIEW: CPT

## 2024-10-06 PROCEDURE — 83735 ASSAY OF MAGNESIUM: CPT | Performed by: PHYSICIAN ASSISTANT

## 2024-10-06 PROCEDURE — 96374 THER/PROPH/DIAG INJ IV PUSH: CPT

## 2024-10-06 PROCEDURE — 85007 BL SMEAR W/DIFF WBC COUNT: CPT

## 2024-10-06 PROCEDURE — 80053 COMPREHEN METABOLIC PANEL: CPT

## 2024-10-06 PROCEDURE — 99284 EMERGENCY DEPT VISIT MOD MDM: CPT

## 2024-10-06 PROCEDURE — 83690 ASSAY OF LIPASE: CPT

## 2024-10-06 PROCEDURE — 70450 CT HEAD/BRAIN W/O DYE: CPT

## 2024-10-06 PROCEDURE — 82010 KETONE BODYS QUAN: CPT | Performed by: PHYSICIAN ASSISTANT

## 2024-10-06 PROCEDURE — 93005 ELECTROCARDIOGRAM TRACING: CPT

## 2024-10-06 PROCEDURE — 82550 ASSAY OF CK (CPK): CPT

## 2024-10-06 PROCEDURE — 73521 X-RAY EXAM HIPS BI 2 VIEWS: CPT

## 2024-10-06 PROCEDURE — 74177 CT ABD & PELVIS W/CONTRAST: CPT

## 2024-10-06 PROCEDURE — 96361 HYDRATE IV INFUSION ADD-ON: CPT

## 2024-10-06 PROCEDURE — 96376 TX/PRO/DX INJ SAME DRUG ADON: CPT

## 2024-10-06 PROCEDURE — 85652 RBC SED RATE AUTOMATED: CPT

## 2024-10-06 PROCEDURE — 85027 COMPLETE CBC AUTOMATED: CPT

## 2024-10-06 PROCEDURE — 86140 C-REACTIVE PROTEIN: CPT

## 2024-10-06 RX ORDER — LABETALOL HYDROCHLORIDE 5 MG/ML
10 INJECTION, SOLUTION INTRAVENOUS ONCE
Status: COMPLETED | OUTPATIENT
Start: 2024-10-06 | End: 2024-10-06

## 2024-10-06 RX ORDER — CLONAZEPAM 1 MG/1
1 TABLET ORAL 2 TIMES DAILY
Status: DISCONTINUED | OUTPATIENT
Start: 2024-10-06 | End: 2024-10-06

## 2024-10-06 RX ORDER — POTASSIUM CHLORIDE 1500 MG/1
40 TABLET, EXTENDED RELEASE ORAL ONCE
Status: COMPLETED | OUTPATIENT
Start: 2024-10-06 | End: 2024-10-06

## 2024-10-06 RX ORDER — INSULIN LISPRO 100 [IU]/ML
1-6 INJECTION, SOLUTION INTRAVENOUS; SUBCUTANEOUS
Status: DISCONTINUED | OUTPATIENT
Start: 2024-10-06 | End: 2024-10-08 | Stop reason: HOSPADM

## 2024-10-06 RX ORDER — SENNOSIDES 8.6 MG
8.6 TABLET ORAL 2 TIMES DAILY
Status: DISCONTINUED | OUTPATIENT
Start: 2024-10-06 | End: 2024-10-08 | Stop reason: HOSPADM

## 2024-10-06 RX ORDER — MAGNESIUM HYDROXIDE/ALUMINUM HYDROXICE/SIMETHICONE 120; 1200; 1200 MG/30ML; MG/30ML; MG/30ML
30 SUSPENSION ORAL ONCE
Status: COMPLETED | OUTPATIENT
Start: 2024-10-06 | End: 2024-10-06

## 2024-10-06 RX ORDER — SODIUM CHLORIDE, SODIUM GLUCONATE, SODIUM ACETATE, POTASSIUM CHLORIDE, MAGNESIUM CHLORIDE, SODIUM PHOSPHATE, DIBASIC, AND POTASSIUM PHOSPHATE .53; .5; .37; .037; .03; .012; .00082 G/100ML; G/100ML; G/100ML; G/100ML; G/100ML; G/100ML; G/100ML
75 INJECTION, SOLUTION INTRAVENOUS CONTINUOUS
Status: DISCONTINUED | OUTPATIENT
Start: 2024-10-06 | End: 2024-10-07

## 2024-10-06 RX ORDER — FOLIC ACID 1 MG/1
2000 TABLET ORAL DAILY
Status: DISCONTINUED | OUTPATIENT
Start: 2024-10-07 | End: 2024-10-08 | Stop reason: HOSPADM

## 2024-10-06 RX ORDER — PANTOPRAZOLE SODIUM 40 MG/10ML
40 INJECTION, POWDER, LYOPHILIZED, FOR SOLUTION INTRAVENOUS EVERY 12 HOURS SCHEDULED
Status: DISCONTINUED | OUTPATIENT
Start: 2024-10-06 | End: 2024-10-08 | Stop reason: HOSPADM

## 2024-10-06 RX ORDER — LIDOCAINE 50 MG/G
1 PATCH TOPICAL DAILY
Status: DISCONTINUED | OUTPATIENT
Start: 2024-10-06 | End: 2024-10-08 | Stop reason: HOSPADM

## 2024-10-06 RX ORDER — MIRTAZAPINE 15 MG/1
45 TABLET, FILM COATED ORAL
Status: DISCONTINUED | OUTPATIENT
Start: 2024-10-06 | End: 2024-10-08 | Stop reason: HOSPADM

## 2024-10-06 RX ORDER — ACETAMINOPHEN 325 MG/1
650 TABLET ORAL EVERY 6 HOURS PRN
Status: DISCONTINUED | OUTPATIENT
Start: 2024-10-06 | End: 2024-10-08 | Stop reason: HOSPADM

## 2024-10-06 RX ORDER — ONDANSETRON 2 MG/ML
4 INJECTION INTRAMUSCULAR; INTRAVENOUS EVERY 6 HOURS PRN
Status: DISCONTINUED | OUTPATIENT
Start: 2024-10-06 | End: 2024-10-08 | Stop reason: HOSPADM

## 2024-10-06 RX ORDER — GABAPENTIN 100 MG/1
100 CAPSULE ORAL DAILY
Status: DISCONTINUED | OUTPATIENT
Start: 2024-10-06 | End: 2024-10-08 | Stop reason: HOSPADM

## 2024-10-06 RX ORDER — MAGNESIUM SULFATE HEPTAHYDRATE 40 MG/ML
2 INJECTION, SOLUTION INTRAVENOUS ONCE
Status: COMPLETED | OUTPATIENT
Start: 2024-10-06 | End: 2024-10-06

## 2024-10-06 RX ORDER — KETOROLAC TROMETHAMINE 30 MG/ML
15 INJECTION, SOLUTION INTRAMUSCULAR; INTRAVENOUS ONCE
Status: COMPLETED | OUTPATIENT
Start: 2024-10-06 | End: 2024-10-06

## 2024-10-06 RX ORDER — METHOCARBAMOL 500 MG/1
750 TABLET, FILM COATED ORAL EVERY 6 HOURS PRN
Status: DISCONTINUED | OUTPATIENT
Start: 2024-10-06 | End: 2024-10-08 | Stop reason: HOSPADM

## 2024-10-06 RX ORDER — ONDANSETRON 2 MG/ML
4 INJECTION INTRAMUSCULAR; INTRAVENOUS ONCE
Status: COMPLETED | OUTPATIENT
Start: 2024-10-06 | End: 2024-10-06

## 2024-10-06 RX ORDER — CLONAZEPAM 1 MG/1
1 TABLET ORAL 2 TIMES DAILY
Status: DISCONTINUED | OUTPATIENT
Start: 2024-10-06 | End: 2024-10-08 | Stop reason: HOSPADM

## 2024-10-06 RX ORDER — FINASTERIDE 5 MG/1
5 TABLET, FILM COATED ORAL DAILY
Status: DISCONTINUED | OUTPATIENT
Start: 2024-10-06 | End: 2024-10-08 | Stop reason: HOSPADM

## 2024-10-06 RX ORDER — PRAVASTATIN SODIUM 20 MG
20 TABLET ORAL
Status: DISCONTINUED | OUTPATIENT
Start: 2024-10-06 | End: 2024-10-08 | Stop reason: HOSPADM

## 2024-10-06 RX ORDER — TRAMADOL HYDROCHLORIDE 50 MG/1
50 TABLET ORAL EVERY 6 HOURS PRN
Status: DISCONTINUED | OUTPATIENT
Start: 2024-10-06 | End: 2024-10-08 | Stop reason: HOSPADM

## 2024-10-06 RX ORDER — ENOXAPARIN SODIUM 100 MG/ML
40 INJECTION SUBCUTANEOUS DAILY
Status: DISCONTINUED | OUTPATIENT
Start: 2024-10-07 | End: 2024-10-08 | Stop reason: HOSPADM

## 2024-10-06 RX ORDER — OXYCODONE HYDROCHLORIDE 5 MG/1
5 TABLET ORAL EVERY 6 HOURS PRN
Status: DISCONTINUED | OUTPATIENT
Start: 2024-10-06 | End: 2024-10-07

## 2024-10-06 RX ORDER — HYDROMORPHONE HCL/PF 1 MG/ML
0.5 SYRINGE (ML) INJECTION ONCE
Status: COMPLETED | OUTPATIENT
Start: 2024-10-06 | End: 2024-10-06

## 2024-10-06 RX ORDER — LANOLIN ALCOHOL/MO/W.PET/CERES
6 CREAM (GRAM) TOPICAL
Status: DISCONTINUED | OUTPATIENT
Start: 2024-10-06 | End: 2024-10-08 | Stop reason: HOSPADM

## 2024-10-06 RX ORDER — NIFEDIPINE 30 MG/1
120 TABLET, EXTENDED RELEASE ORAL DAILY
Status: DISCONTINUED | OUTPATIENT
Start: 2024-10-06 | End: 2024-10-08 | Stop reason: HOSPADM

## 2024-10-06 RX ORDER — METOPROLOL SUCCINATE 50 MG/1
50 TABLET, EXTENDED RELEASE ORAL 2 TIMES DAILY
Status: DISCONTINUED | OUTPATIENT
Start: 2024-10-06 | End: 2024-10-08 | Stop reason: HOSPADM

## 2024-10-06 RX ORDER — ESCITALOPRAM OXALATE 20 MG/1
20 TABLET ORAL DAILY
Status: DISCONTINUED | OUTPATIENT
Start: 2024-10-07 | End: 2024-10-08 | Stop reason: HOSPADM

## 2024-10-06 RX ORDER — TAMSULOSIN HYDROCHLORIDE 0.4 MG/1
0.4 CAPSULE ORAL
Status: DISCONTINUED | OUTPATIENT
Start: 2024-10-06 | End: 2024-10-08 | Stop reason: HOSPADM

## 2024-10-06 RX ADMIN — NIFEDIPINE 120 MG: 30 TABLET, FILM COATED, EXTENDED RELEASE ORAL at 16:01

## 2024-10-06 RX ADMIN — METOPROLOL SUCCINATE 50 MG: 50 TABLET, EXTENDED RELEASE ORAL at 21:14

## 2024-10-06 RX ADMIN — POTASSIUM CHLORIDE 40 MEQ: 1500 TABLET, EXTENDED RELEASE ORAL at 14:11

## 2024-10-06 RX ADMIN — SENNOSIDES 8.6 MG: 8.6 TABLET, FILM COATED ORAL at 17:58

## 2024-10-06 RX ADMIN — OXYCODONE HYDROCHLORIDE 5 MG: 5 TABLET ORAL at 14:17

## 2024-10-06 RX ADMIN — ALUMINUM HYDROXIDE, MAGNESIUM HYDROXIDE, AND SIMETHICONE 30 ML: 200; 200; 20 SUSPENSION ORAL at 10:18

## 2024-10-06 RX ADMIN — SODIUM CHLORIDE 1000 ML: 0.9 INJECTION, SOLUTION INTRAVENOUS at 08:36

## 2024-10-06 RX ADMIN — ONDANSETRON 4 MG: 2 INJECTION, SOLUTION INTRAMUSCULAR; INTRAVENOUS at 09:01

## 2024-10-06 RX ADMIN — SODIUM CHLORIDE, SODIUM GLUCONATE, SODIUM ACETATE, POTASSIUM CHLORIDE, MAGNESIUM CHLORIDE, SODIUM PHOSPHATE, DIBASIC, AND POTASSIUM PHOSPHATE 75 ML/HR: .53; .5; .37; .037; .03; .012; .00082 INJECTION, SOLUTION INTRAVENOUS at 14:16

## 2024-10-06 RX ADMIN — CLONAZEPAM 1 MG: 1 TABLET ORAL at 15:49

## 2024-10-06 RX ADMIN — PANTOPRAZOLE SODIUM 40 MG: 40 INJECTION, POWDER, FOR SOLUTION INTRAVENOUS at 21:07

## 2024-10-06 RX ADMIN — PRAVASTATIN SODIUM 20 MG: 20 TABLET ORAL at 15:49

## 2024-10-06 RX ADMIN — METOPROLOL SUCCINATE 50 MG: 50 TABLET, EXTENDED RELEASE ORAL at 14:11

## 2024-10-06 RX ADMIN — Medication 6 MG: at 21:07

## 2024-10-06 RX ADMIN — HYDROMORPHONE HYDROCHLORIDE 0.5 MG: 1 INJECTION, SOLUTION INTRAMUSCULAR; INTRAVENOUS; SUBCUTANEOUS at 10:18

## 2024-10-06 RX ADMIN — GABAPENTIN 100 MG: 100 CAPSULE ORAL at 14:11

## 2024-10-06 RX ADMIN — PANTOPRAZOLE SODIUM 40 MG: 40 INJECTION, POWDER, FOR SOLUTION INTRAVENOUS at 16:01

## 2024-10-06 RX ADMIN — FINASTERIDE 5 MG: 5 TABLET, FILM COATED ORAL at 14:11

## 2024-10-06 RX ADMIN — MIRTAZAPINE 45 MG: 15 TABLET, FILM COATED ORAL at 21:07

## 2024-10-06 RX ADMIN — MAGNESIUM SULFATE IN WATER 2 G: 40 INJECTION, SOLUTION INTRAVENOUS at 15:49

## 2024-10-06 RX ADMIN — HYDROMORPHONE HYDROCHLORIDE 0.5 MG: 1 INJECTION, SOLUTION INTRAMUSCULAR; INTRAVENOUS; SUBCUTANEOUS at 12:34

## 2024-10-06 RX ADMIN — OXYCODONE HYDROCHLORIDE 5 MG: 5 TABLET ORAL at 19:55

## 2024-10-06 RX ADMIN — TAMSULOSIN HYDROCHLORIDE 0.4 MG: 0.4 CAPSULE ORAL at 15:49

## 2024-10-06 RX ADMIN — KETOROLAC TROMETHAMINE 15 MG: 30 INJECTION, SOLUTION INTRAMUSCULAR at 09:01

## 2024-10-06 RX ADMIN — LIDOCAINE 1 PATCH: 700 PATCH TOPICAL at 14:11

## 2024-10-06 RX ADMIN — LABETALOL HYDROCHLORIDE 10 MG: 5 INJECTION, SOLUTION INTRAVENOUS at 12:49

## 2024-10-06 RX ADMIN — ONDANSETRON 4 MG: 2 INJECTION, SOLUTION INTRAMUSCULAR; INTRAVENOUS at 12:34

## 2024-10-06 RX ADMIN — IOHEXOL 100 ML: 350 INJECTION, SOLUTION INTRAVENOUS at 10:10

## 2024-10-06 NOTE — Clinical Note
Case was discussed with CODY and the patient's admission status was agreed to be Admission Status: observation status to the service of Dr. ROSS .

## 2024-10-06 NOTE — ASSESSMENT & PLAN NOTE
Elevated troponin 135, 129  EKG without ischemic changes  Follow-up 4-hour troponin  Suspect in setting of elevated blood pressures  Monitor on telemetry

## 2024-10-06 NOTE — ASSESSMENT & PLAN NOTE
"Lab Results   Component Value Date    HGBA1C 7.9 (H) 05/19/2024       No results for input(s): \"POCGLU\" in the last 72 hours.    Blood Sugar Average: Last 72 hrs:      "

## 2024-10-06 NOTE — ASSESSMENT & PLAN NOTE
"Lab Results   Component Value Date    HGBA1C 7.9 (H) 05/19/2024       No results for input(s): \"POCGLU\" in the last 72 hours.    Blood Sugar Average: Last 72 hrs:  Hold metformin, Jardiance  SSI plus Accu-Chek  Check updated hgba1c    "

## 2024-10-06 NOTE — ASSESSMENT & PLAN NOTE
Presented to the emergency department following mechanical fall and subsequent hip pain  Accelerated blood pressures on admission, SBP in the 200s  S/p IV labetalol x 1  CT head negative for acute intracranial abnormalities  Home regimen includes Toprol XL 50 mg twice daily, Procardia 60 mg twice daily -resume antihypertensive regimen  May also be driven by poor pain control  Monitor BP per unit protocol

## 2024-10-06 NOTE — H&P
"H&P - Hospitalist   Name: Juan San 69 y.o. male I MRN: [id number]  Unit/Bed#: -01 I Date of Admission: 10/6/2024   Date of Service: 10/6/2024 I Hospital Day: 0     Assessment & Plan  Hypertensive urgency  Presented to the emergency department following mechanical fall and subsequent hip pain  Accelerated blood pressures on admission, SBP in the 200s  S/p IV labetalol x 1  CT head negative for acute intracranial abnormalities  Home regimen includes Toprol XL 50 mg twice daily, Procardia 60 mg twice daily -resume antihypertensive regimen  May also be driven by poor pain control  Monitor BP per unit protocol  Nausea and vomiting  Patient with several episodes of bilious vomiting  CT abdomen/pelvis unremarkable  CT head negative for acute intracranial abnormalities  Continue supportive care with IVF, antiemetics  Trial clear liquid diet and advance as tolerated  Elevated troponin I level  Elevated troponin 135, 129  EKG without ischemic changes  Follow-up 4-hour troponin  Suspect in setting of elevated blood pressures  Monitor on telemetry  Chronic pain syndrome  Home regimen includes tramadol 50 mg every 8 hours as needed  Last filled 10/3/2024 per PDMP  Type 2 diabetes mellitus with hyperglycemia, without long-term current use of insulin (HCC)  Lab Results   Component Value Date    HGBA1C 7.9 (H) 05/19/2024       No results for input(s): \"POCGLU\" in the last 72 hours.    Blood Sugar Average: Last 72 hrs:  Hold metformin, Jardiance  SSI plus Accu-Chek  Check updated hgba1c    Thyroid cancer (HCC)  S/p partial thyroidectomy  Not maintained on Synthroid  Follows with Horacio Jackson  Benign prostatic hyperplasia with urinary frequency  Continue Flomax, Proscar  Ambulatory dysfunction  Presented to the emergency department with bilateral hip pain following mechanical fall  CT recon lumbar spine pending  Continue pain control  Fall precautions  PT/OT evaluation  SIRS (systemic inflammatory response syndrome) " "(HCC)  Patient met SIRS criteria with tachycardia, leukocytosis in setting of vomiting  CT abdomen/pelvis without acute findings  Check CXR, UA  Trend WBC and fever curve  Monitor off antibiotics  Hypokalemia  Potassium 3.4  Repleted  Check magnesium  Repeat BMP  Metabolic acidosis, increased anion gap  Anion gap noted to be 19 on admission  Suspect related to recent vomiting, CT abdomen unremarkable  S/p 1 L IVF in the ED  Check lactic acid, repeat BMP, VBG, beta hydroxybutyrate given hyperglycemia    VTE Pharmacologic Prophylaxis: VTE Score: 3 Moderate Risk (Score 3-4) - Pharmacological DVT Prophylaxis Ordered: enoxaparin (Lovenox).  Code Status: Level 1 - Full Code   Discussion with family: Patient declined call to .     Anticipated Length of Stay: Patient will be admitted on an observation basis with an anticipated length of stay of less than 2 midnights secondary to hypertensive urgency, nausea/vomiting.    History of Present Illness   Chief Complaint: Back pain    Juan San is a 69 y.o. male with a PMH of type 2 diabetes, history of thyroid cancer, BPH, chronic pain syndrome, who presents with back pain s/p fall.    Patient presents to the emergency department with worsening low back and bilateral hip pain following a mechanical fall earlier this morning.  Denies chest pain/palpitations, shortness of breath.  Admitted to nausea and dry heaving prior to coming to the hospital as well as in the ER but currently nausea has resolved after medication.  Denies headache, visual changes, numbness/tingling, extremity weakness.  Notes that his abdomen feels \"sore\" likely due to episodes of dry heaving.  Expressed that he had 2 brothers recently pass away which has been an added stressor.    Review of Systems   Constitutional:  Positive for fatigue. Negative for chills, fever and unexpected weight change.   HENT:  Negative for congestion, sore throat and trouble swallowing.    Eyes:  Negative for " photophobia, pain and visual disturbance.   Respiratory:  Negative for cough, shortness of breath and wheezing.    Cardiovascular:  Negative for chest pain, palpitations and leg swelling.   Gastrointestinal:  Positive for nausea. Negative for abdominal pain, constipation, diarrhea and vomiting.   Endocrine: Negative for polyuria.   Genitourinary:  Negative for difficulty urinating, dysuria, flank pain, hematuria and urgency.   Musculoskeletal:  Positive for back pain and gait problem. Negative for myalgias, neck pain and neck stiffness.   Skin:  Negative for pallor and rash.   Neurological:  Negative for dizziness, tremors, syncope, speech difficulty, weakness, light-headedness and headaches.   Hematological:  Does not bruise/bleed easily.   Psychiatric/Behavioral:  Negative for agitation and confusion.        Historical Information   Past Medical History:   Diagnosis Date    Anxiety     Arthritis     Cancer (HCC)     Chronic back pain     Depression     Diabetes mellitus (HCC)     Hypertension     Liver disease     Mitral valve prolapse     Peripheral neuropathy     Neuropathy    PONV (postoperative nausea and vomiting)     Thyroid disease      Past Surgical History:   Procedure Laterality Date    COLONOSCOPY      INCISION AND DRAINAGE OF WOUND Left 08/12/2022    Procedure: INCISION AND DRAINAGE (I&D) EXTREMITY;  Surgeon: Moustapha Cote DPM;  Location:  MAIN OR;  Service: Podiatry    IR BIOPSY SPINE  1/30/2024    IR BIOPSY SPINE  2/1/2024    IR PICC PLACEMENT SINGLE LUMEN  2/6/2024    JOINT REPLACEMENT Left 03/11/2022    Left TSA    NC ARTHRODESIS POSTERIOR/PSTLAT TQ 1NTRSPC LUMBAR Bilateral 04/11/2023    Procedure: L1-S1 navigated posterior decompression with instrumented fixation fusion;  Surgeon: James Moraes MD;  Location:  MAIN OR;  Service: Neurosurgery    THYROID SURGERY  2021    remove cancer     Social History     Tobacco Use    Smoking status: Former     Current packs/day: 0.00     Average  packs/day: 0.3 packs/day for 5.9 years (1.5 ttl pk-yrs)     Types: Cigarettes     Start date: 1975     Quit date: 1981     Years since quittin.3    Smokeless tobacco: Never    Tobacco comments:     quit ; smokes when in pain as of 26   Vaping Use    Vaping status: Former    Substances: THC, CBD   Substance and Sexual Activity    Alcohol use: Never    Drug use: Yes     Frequency: 7.0 times per week     Types: Marijuana     Comment: Medical Marijuana, takes for pain, daily, vape    Sexual activity: Yes     Partners: Female     Birth control/protection: None     E-Cigarette/Vaping    E-Cigarette Use Former User     Comments medical marijuana - occ      E-Cigarette/Vaping Substances    Nicotine No     THC Yes     CBD Yes     Flavoring No     Other No     Unknown No      Family History   Problem Relation Age of Onset    No Known Problems Father     No Known Problems Mother     Colon cancer Neg Hx      Social History:  Marital Status: /Civil Union   Occupation:   Patient Pre-hospital Living Situation: Home  Patient Pre-hospital Level of Mobility: unable to be assessed at time of evaluation  Patient Pre-hospital Diet Restrictions:     Objective :  Temp:  [98.4 °F (36.9 °C)] 98.4 °F (36.9 °C)  HR:  [] 95  BP: (187-222)/(86-98) 187/89  Resp:  [18-22] 22  SpO2:  [98 %-100 %] 98 %  O2 Device: None (Room air)    Physical Exam  Vitals and nursing note reviewed.   Constitutional:       Appearance: Normal appearance.      Comments: No acute distress   HENT:      Head: Normocephalic.   Eyes:      General: No scleral icterus.     Extraocular Movements: Extraocular movements intact.      Conjunctiva/sclera: Conjunctivae normal.   Cardiovascular:      Rate and Rhythm: Normal rate and regular rhythm.      Heart sounds: Normal heart sounds. No murmur heard.  Pulmonary:      Effort: Pulmonary effort is normal. No respiratory distress.      Breath sounds: Normal breath sounds. No wheezing, rhonchi or  rales.   Abdominal:      General: Bowel sounds are normal.      Palpations: Abdomen is soft.      Tenderness: There is no abdominal tenderness. There is no guarding or rebound.   Musculoskeletal:      Cervical back: Normal range of motion.      Comments: Able to move upper/lower extremities bilaterally, no edema   Skin:     General: Skin is warm and dry.   Neurological:      General: No focal deficit present.      Mental Status: He is alert and oriented to person, place, and time.   Psychiatric:         Mood and Affect: Mood normal.         Speech: Speech normal.         Behavior: Behavior normal.         Lines/Drains:            Lab Results: I have reviewed the following results:  Results from last 7 days   Lab Units 10/06/24  0836   WBC Thousand/uL 12.00*   HEMOGLOBIN g/dL 11.8*   HEMATOCRIT % 37.2   PLATELETS Thousands/uL 428*   LYMPHO PCT % 5*   MONO PCT % 2*   EOS PCT % 0     Results from last 7 days   Lab Units 10/06/24  0836   SODIUM mmol/L 138   POTASSIUM mmol/L 3.4*   CHLORIDE mmol/L 93*   CO2 mmol/L 26   BUN mg/dL 10   CREATININE mg/dL 0.76   ANION GAP mmol/L 19*   CALCIUM mg/dL 9.9   ALBUMIN g/dL 4.3   TOTAL BILIRUBIN mg/dL 0.41   ALK PHOS U/L 83   ALT U/L 11   AST U/L 17   GLUCOSE RANDOM mg/dL 208*             Lab Results   Component Value Date    HGBA1C 7.9 (H) 05/19/2024    HGBA1C 8.2 (H) 01/24/2024    HGBA1C 6.3 (H) 03/14/2023           Imaging Results Review: I reviewed radiology reports from this admission including: CT abdomen/pelvis and CT head.  Other Study Results Review: EKG was reviewed.     Administrative Statements   I have spent a total time of 70 minutes in caring for this patient on the day of the visit/encounter including Diagnostic results, Patient and family education, Counseling / Coordination of care, Documenting in the medical record, Reviewing / ordering tests, medicine, procedures  , and Obtaining or reviewing history  .    ** Please Note: This note has been constructed using a  voice recognition system. **

## 2024-10-06 NOTE — ASSESSMENT & PLAN NOTE
Anion gap noted to be 19 on admission  Suspect related to recent vomiting, CT abdomen unremarkable  S/p 1 L IVF in the ED  Check lactic acid, repeat BMP, VBG, beta hydroxybutyrate given hyperglycemia

## 2024-10-06 NOTE — ASSESSMENT & PLAN NOTE
Patient with several episodes of bilious vomiting  CT abdomen/pelvis unremarkable  CT head negative for acute intracranial abnormalities  Continue supportive care with IVF, antiemetics  Trial clear liquid diet and advance as tolerated

## 2024-10-06 NOTE — ASSESSMENT & PLAN NOTE
Presented to the emergency department with bilateral hip pain following mechanical fall  CT recon lumbar spine pending  Continue pain control  Fall precautions  PT/OT evaluation

## 2024-10-06 NOTE — ASSESSMENT & PLAN NOTE
Patient met SIRS criteria with tachycardia, leukocytosis in setting of vomiting  CT abdomen/pelvis without acute findings  Check CXR, UA  Trend WBC and fever curve  Monitor off antibiotics

## 2024-10-06 NOTE — PLAN OF CARE
Problem: Potential for Falls  Goal: Patient will remain free of falls  Description: INTERVENTIONS:  - Educate patient/family on patient safety including physical limitations  - Instruct patient to call for assistance with activity   - Consult OT/PT to assist with strengthening/mobility   - Keep Call bell within reach  - Keep bed low and locked with side rails adjusted as appropriate  - Keep care items and personal belongings within reach  - Initiate and maintain comfort rounds  - Make Fall Risk Sign visible to staff  - Offer Toileting every 2 Hours, in advance of need  - Initiate/Maintain bed/chair alarm  - Obtain necessary fall risk management equipment:   - Apply yellow socks and bracelet for high fall risk patients  - Consider moving patient to room near nurses station  Outcome: Progressing     Problem: PAIN - ADULT  Goal: Verbalizes/displays adequate comfort level or baseline comfort level  Description: Interventions:  - Encourage patient to monitor pain and request assistance  - Assess pain using appropriate pain scale  - Administer analgesics based on type and severity of pain and evaluate response  - Implement non-pharmacological measures as appropriate and evaluate response  - Consider cultural and social influences on pain and pain management  - Notify physician/advanced practitioner if interventions unsuccessful or patient reports new pain  Outcome: Progressing     Problem: INFECTION - ADULT  Goal: Absence or prevention of progression during hospitalization  Description: INTERVENTIONS:  - Assess and monitor for signs and symptoms of infection  - Monitor lab/diagnostic results  - Monitor all insertion sites, i.e. indwelling lines, tubes, and drains  - Monitor endotracheal if appropriate and nasal secretions for changes in amount and color  - [place] appropriate cooling/warming therapies per order  - Administer medications as ordered  - Instruct and encourage patient and family to use good hand hygiene  technique  - Identify and instruct in appropriate isolation precautions for identified infection/condition  Outcome: Progressing  Goal: Absence of fever/infection during neutropenic period  Description: INTERVENTIONS:  - Monitor WBC    Outcome: Progressing     Problem: SAFETY ADULT  Goal: Patient will remain free of falls  Description: INTERVENTIONS:  - Educate patient/family on patient safety including physical limitations  - Instruct patient to call for assistance with activity   - Consult OT/PT to assist with strengthening/mobility   - Keep Call bell within reach  - Keep bed low and locked with side rails adjusted as appropriate  - Keep care items and personal belongings within reach  - Initiate and maintain comfort rounds  - Make Fall Risk Sign visible to staff  - Offer Toileting every 2 Hours, in advance of need  - Initiate/Maintain bed/chair alarm  - Obtain necessary fall risk management equipment:   - Apply yellow socks and bracelet for high fall risk patients  - Consider moving patient to room near nurses station  Outcome: Progressing  Goal: Maintain or return to baseline ADL function  Description: INTERVENTIONS:  -  Assess patient's ability to carry out ADLs; assess patient's baseline for ADL function and identify physical deficits which impact ability to perform ADLs (bathing, care of mouth/teeth, toileting, grooming, dressing, etc.)  - Assess/evaluate cause of self-care deficits   - Assess range of motion  - Assess patient's mobility; develop plan if impaired  - Assess patient's need for assistive devices and provide as appropriate  - Encourage maximum independence but intervene and supervise when necessary  - Involve family in performance of ADLs  - Assess for home care needs following discharge   - Consider OT consult to assist with ADL evaluation and planning for discharge  - Provide patient education as appropriate  Outcome: Progressing  Goal: Maintains/Returns to pre admission functional  level  Description: INTERVENTIONS:  - Perform AM-PAC 6 Click Basic Mobility/ Daily Activity assessment daily.  - Set and communicate daily mobility goal to care team and patient/family/caregiver.   - Collaborate with rehabilitation services on mobility goals if consulted  - Perform Range of Motion 3 times a day.  - Reposition patient every 3 hours.  - Dangle patient 3 times a day  - Stand patient 3 times a day  - Ambulate patient 3 times a day  - Out of bed to chair 3 times a day   - Out of bed for meals 3 times a day  - Out of bed for toileting  - Record patient progress and toleration of activity level   Outcome: Progressing     Problem: DISCHARGE PLANNING  Goal: Discharge to home or other facility with appropriate resources  Description: INTERVENTIONS:  - Identify barriers to discharge w/patient and caregiver  - Arrange for needed discharge resources and transportation as appropriate  - Identify discharge learning needs (meds, wound care, etc.)  - Arrange for interpretive services to assist at discharge as needed  - Refer to Case Management Department for coordinating discharge planning if the patient needs post-hospital services based on physician/advanced practitioner order or complex needs related to functional status, cognitive ability, or social support system  Outcome: Progressing     Problem: Knowledge Deficit  Goal: Patient/family/caregiver demonstrates understanding of disease process, treatment plan, medications, and discharge instructions  Description: Complete learning assessment and assess knowledge base.  Interventions:  - Provide teaching at level of understanding  - Provide teaching via preferred learning methods  Outcome: Progressing     Problem: SKIN/TISSUE INTEGRITY - ADULT  Goal: Skin Integrity remains intact(Skin Breakdown Prevention)  Description: Assess:  -Perform Sanjay assessment every shift  -Clean and moisturize skin every shift and PRN  -Inspect skin when repositioning, toileting, and  assisting with ADLS  -Assess under medical devices  -Assess extremities for adequate circulation and sensation     Bed Management:  -Have minimal linens on bed & keep smooth, unwrinkled  -Change linens as needed when moist or perspiring  -Avoid sitting or lying in one position for more than 2 hours while in bed  -Keep HOB at 30 degrees     Toileting:  -Offer bedside commode  -Assess for incontinence   -Use incontinent care products after each incontinent episode     Activity:  -Mobilize patient 2 times a day  -Encourage activity and walks on unit  -Encourage or provide ROM exercises   -Turn and reposition patient every 2 Hours  -Use appropriate equipment to lift or move patient in bed  -Instruct/ Assist with weight shifting every 2 when out of bed in chair  -Consider limitation of chair time 2 hour intervals    Skin Care:  -Avoid use of baby powder, tape, friction and shearing, hot water or constrictive clothing  -Relieve pressure over bony prominences   -Do not massage red bony areas    Next Steps:  -Teach patient strategies to minimize risks   -Consider consults to  interdisciplinary teams   Outcome: Progressing  Goal: Incision(s), wounds(s) or drain site(s) healing without S/S of infection  Description: INTERVENTIONS  - Assess and document dressing, incision, wound bed, drain sites and surrounding tissue  - Provide patient and family education  - Perform skin care/dressing changes every shift and PRN  Outcome: Progressing     Problem: MOBILITY - ADULT  Goal: Maintain or return to baseline ADL function  Description: INTERVENTIONS:  -  Assess patient's ability to carry out ADLs; assess patient's baseline for ADL function and identify physical deficits which impact ability to perform ADLs (bathing, care of mouth/teeth, toileting, grooming, dressing, etc.)  - Assess/evaluate cause of self-care deficits   - Assess range of motion  - Assess patient's mobility; develop plan if impaired  - Assess patient's need for  assistive devices and provide as appropriate  - Encourage maximum independence but intervene and supervise when necessary  - Involve family in performance of ADLs  - Assess for home care needs following discharge   - Consider OT consult to assist with ADL evaluation and planning for discharge  - Provide patient education as appropriate  Outcome: Progressing  Goal: Maintains/Returns to pre admission functional level  Description: INTERVENTIONS:  - Perform AM-PAC 6 Click Basic Mobility/ Daily Activity assessment daily.  - Set and communicate daily mobility goal to care team and patient/family/caregiver.   - Collaborate with rehabilitation services on mobility goals if consulted  - Perform Range of Motion 3 times a day.  - Reposition patient every 3 hours.  - Dangle patient 3 times a day  - Stand patient 3 times a day  - Ambulate patient 3 times a day  - Out of bed to chair 3 times a day   - Out of bed for meals 3 times a day  - Out of bed for toileting  - Record patient progress and toleration of activity level   Outcome: Progressing

## 2024-10-06 NOTE — ED PROVIDER NOTES
Final diagnoses:   Low back pain   Fall, initial encounter   Bilateral hip pain   Nausea and vomiting   Elevated troponin     ED Disposition       ED Disposition   Admit    Condition   Stable    Date/Time   Sun Oct 6, 2024 12:38 PM    Comment   Case was discussed with Dr. Shannon and the patient's admission status was agreed to be Admission Status: observation status to the service of Dr. Shannon.               Assessment & Plan       Medical Decision Making  DDx including but not limited to: Strain, sprain, sciatica, arthritis, complication of hardware, hip fracture, atypical ACS, GERD, gastritis, gastroenteritis    Patient with trip and fall at home today.  Of note, he also has nausea and vomiting without abdominal pain or tenderness.  Will obtain EKG and troponin to further evaluate for atypical ACS.  Will obtain chest x-ray.  Will obtain labs and CT imaging to further evaluate for acute cause for nausea and vomiting as well as recon of lumbar spine given bony tenderness at L5.  Will medicate with fluids and Toradol.        Problems Addressed:  Bilateral hip pain: acute illness or injury  Elevated troponin: acute illness or injury  Fall, initial encounter: acute illness or injury  Low back pain: chronic illness or injury  Nausea and vomiting: acute illness or injury    Amount and/or Complexity of Data Reviewed  Labs: ordered.  Radiology: ordered and independent interpretation performed. Decision-making details documented in ED Course.  ECG/medicine tests: ordered and independent interpretation performed.    Risk  OTC drugs.  Prescription drug management.  Decision regarding hospitalization.        ED Course as of 10/06/24 1300   [place] Oct 06, 2024   1010 Patient updated on results.  He notes his pain is slightly improved with Toradol but he continues endorse 8 out of 10 low back and bilateral hip pain.  I reviewed with him the x-rays overall look stable.  I do not appreciate any hip fracture.  Will dose with  Dilaudid while awaiting further workup   1151 CT abdomen pelvis with contrast  IMPRESSION:     1. No acute findings in the abdomen or pelvis. No acute fracture.     2. Stable appearance of the lumbar spine compared to CT 9/20/2024.     3. Prostatomegaly with hypodense foci bilaterally. Please refer to previous MRI of the pelvis from 8/17/2024. Correlate for any  suspicion of prostatitis.        1215 Patient updated on CT results.  We discussed once more his full workup including the elevated troponin which is nonspecific given otherwise stable EKG and absence of chest pain and shortness of breath.  Plan to discuss with cardiology   1225 Case discussed with on-call cardiologist Zachery, he recommends cardiac obs.  Discussed with Dr. Shannon, given elevated blood pressures, will obtain CT head and given to have available       Medications   sodium chloride 0.9 % bolus 1,000 mL (1,000 mL Intravenous New Bag 10/6/24 0836)   ondansetron (ZOFRAN) injection 4 mg (4 mg Intravenous Given 10/6/24 0901)   ketorolac (TORADOL) injection 15 mg (15 mg Intravenous Given 10/6/24 0901)   HYDROmorphone (DILAUDID) injection 0.5 mg (0.5 mg Intravenous Given 10/6/24 1018)   aluminum-magnesium hydroxide-simethicone (MAALOX) oral suspension 30 mL (30 mL Oral Given 10/6/24 1018)   iohexol (OMNIPAQUE) 350 MG/ML injection (MULTI-DOSE) 100 mL (100 mL Intravenous Given 10/6/24 1010)   ondansetron (ZOFRAN) injection 4 mg (4 mg Intravenous Given 10/6/24 1234)   HYDROmorphone (DILAUDID) injection 0.5 mg (0.5 mg Intravenous Given 10/6/24 1234)   labetalol (NORMODYNE) injection 10 mg (10 mg Intravenous Given 10/6/24 1249)       ED Risk Strat Scores                           SBIRT 20yo+      Flowsheet Row Most Recent Value   Initial Alcohol Screen: US AUDIT-C     1. How often do you have a drink containing alcohol? 0 Filed at: 10/06/2024 0815   Audit-C Score 0 Filed at: 10/06/2024 0815   JARON: How many times in the past year have you...    Used an  illegal drug or used a prescription medication for non-medical reasons? Never Filed at: 10/06/2024 0815                            History of Present Illness       Chief Complaint   Patient presents with    Hip Pain     Patient presents to the ER with bilateral hip pain post falling in his home ~0130 this morning.       Past Medical History:   Diagnosis Date    Anxiety     Arthritis     Cancer (HCC)     Chronic back pain     Depression     Diabetes mellitus (HCC)     Hypertension     Liver disease     Mitral valve prolapse     Peripheral neuropathy     Neuropathy    PONV (postoperative nausea and vomiting)     Thyroid disease       Past Surgical History:   Procedure Laterality Date    COLONOSCOPY      INCISION AND DRAINAGE OF WOUND Left 2022    Procedure: INCISION AND DRAINAGE (I&D) EXTREMITY;  Surgeon: Moustapha Cote DPM;  Location:  MAIN OR;  Service: Podiatry    IR BIOPSY SPINE  2024    IR BIOPSY SPINE  2024    IR PICC PLACEMENT SINGLE LUMEN  2024    JOINT REPLACEMENT Left 2022    Left TSA    MO ARTHRODESIS POSTERIOR/PSTLAT TQ 1NTRSPC LUMBAR Bilateral 2023    Procedure: L1-S1 navigated posterior decompression with instrumented fixation fusion;  Surgeon: James Moraes MD;  Location:  MAIN OR;  Service: Neurosurgery    THYROID SURGERY      remove cancer      Family History   Problem Relation Age of Onset    No Known Problems Father     No Known Problems Mother     Colon cancer Neg Hx       Social History     Tobacco Use    Smoking status: Former     Current packs/day: 0.00     Average packs/day: 0.3 packs/day for 5.9 years (1.5 ttl pk-yrs)     Types: Cigarettes     Start date: 1975     Quit date: 1981     Years since quittin.3    Smokeless tobacco: Never    Tobacco comments:     quit ; smokes when in pain as of 26   Vaping Use    Vaping status: Former    Substances: THC, CBD   Substance Use Topics    Alcohol use: Never    Drug use: Yes     Frequency:  7.0 times per week     Types: Marijuana     Comment: Medical Marijuana, takes for pain, daily, vape      E-Cigarette/Vaping    E-Cigarette Use Former User     Comments medical marijuana - occ       E-Cigarette/Vaping Substances    Nicotine No     THC Yes     CBD Yes     Flavoring No     Other No     Unknown No       I have reviewed and agree with the history as documented.     The patient is a 69-year-old male with PMH of anxiety/depression, arthritis, HTN, T2DM, liver disease, and chronic low back pain brought in by EMS after trip and fall this morning.  The patient was walking and fell onto his left side.  He is wife was able to help him get up but he was unable to walk.  On arrival, the patient endorses bilateral hip pain and acute low back pain.  He denies hitting his head, loss of consciousness, aspirin use, and blood thinner use.  He denies lightheadedness or dizziness, chest pain or shortness of breath, and palpitations prior to the fall.  He does report starting with nausea and vomiting yesterday.  He feels like he has reflux coming up and he has been belching more.  He denies fevers, chills, abdominal pain, flank pain, and urinary complaints.      History provided by:  Patient   used: No        Review of Systems   Constitutional:  Negative for chills and fever.   Respiratory:  Negative for cough and shortness of breath.    Cardiovascular:  Negative for chest pain, palpitations and leg swelling.   Gastrointestinal:  Positive for nausea and vomiting. Negative for abdominal pain, constipation and diarrhea.   Genitourinary: Negative.    Musculoskeletal:  Positive for arthralgias (Bilateral hips), back pain and gait problem. Negative for joint swelling and neck pain.   Skin:  Negative for rash and wound.   Neurological:  Negative for dizziness, syncope, light-headedness and headaches.   All other systems reviewed and are negative.          Objective       ED Triage Vitals [10/06/24 0812]    Temperature Pulse Blood Pressure Respirations SpO2 Patient Position - Orthostatic VS   98.4 °F (36.9 °C) (!) 115 (!) 222/98 18 99 % Lying      Temp Source Heart Rate Source BP Location FiO2 (%) Pain Score    Tympanic Monitor Right arm -- 10 - Worst Possible Pain      Vitals      Date and Time Temp Pulse SpO2 Resp BP Pain Score FACES Pain Rating User   10/06/24 1234 -- -- -- -- -- 8 -- MANISH   10/06/24 1200 -- 95 99 % 22 200/86 9 -- CAC   10/06/24 1123 -- 95 100 % 20 195/88 -- -- AL   10/06/24 1122 -- -- -- -- -- 9 -- AL   10/06/24 1018 -- -- -- -- -- 9 -- CAC   10/06/24 0943 -- -- -- -- -- 9 -- CAC   10/06/24 0930 -- 92 100 % 19 201/86 9 -- CAC   10/06/24 0901 -- -- -- -- -- 10 - Worst Possible Pain -- CAC   10/06/24 0812 98.4 °F (36.9 °C) 115 99 % 18 222/98 10 - Worst Possible Pain -- CAC            Physical Exam  Vitals and nursing note reviewed.   Constitutional:       General: He is not in acute distress.     Appearance: Normal appearance. He is well-developed. He is not ill-appearing, toxic-appearing or diaphoretic.   HENT:      Head: Normocephalic and atraumatic. No contusion or laceration.      Jaw: There is normal jaw occlusion. No tenderness, swelling, pain on movement or malocclusion.      Right Ear: No hemotympanum.      Left Ear: No hemotympanum.      Nose: Nose normal. No nasal deformity or signs of injury.      Mouth/Throat:      Lips: Pink.      Mouth: Mucous membranes are moist.      Pharynx: Oropharynx is clear. Uvula midline.   Eyes:      General: Lids are normal. Vision grossly intact. Gaze aligned appropriately.      Extraocular Movements: Extraocular movements intact.      Right eye: No nystagmus.      Left eye: No nystagmus.      Conjunctiva/sclera: Conjunctivae normal.      Pupils: Pupils are equal, round, and reactive to light.   Neck:      Trachea: Phonation normal. No abnormal tracheal secretions.   Cardiovascular:      Rate and Rhythm: Normal rate and regular rhythm.      Pulses:            Radial pulses are 2+ on the right side and 2+ on the left side.        Posterior tibial pulses are 2+ on the right side and 2+ on the left side.      Heart sounds: Normal heart sounds, S1 normal and S2 normal.   Pulmonary:      Effort: Pulmonary effort is normal. No tachypnea or respiratory distress.      Breath sounds: Normal breath sounds and air entry.   Abdominal:      Palpations: Abdomen is soft.      Tenderness: There is no abdominal tenderness.   Musculoskeletal:         General: Normal range of motion.      Right shoulder: Normal.      Left shoulder: Normal.      Right elbow: Normal.      Left elbow: Normal.      Right wrist: Normal.      Left wrist: Normal.      Right hand: Normal.      Left hand: Normal.      Cervical back: Normal, full passive range of motion without pain, normal range of motion and neck supple. No bony tenderness. No spinous process tenderness.      Thoracic back: Normal. No bony tenderness.      Lumbar back: Bony tenderness (L5) present. No swelling, edema, deformity or signs of trauma. Normal range of motion. Negative right straight leg raise test and negative left straight leg raise test.      Right hip: Normal. No deformity or bony tenderness. Normal range of motion.      Left hip: Normal. No deformity or bony tenderness. Normal range of motion.      Right knee: Normal.      Left knee: Normal.      Right ankle: Normal.      Left ankle: Normal.      Right foot: Normal.      Left foot: Normal.   Skin:     General: Skin is warm and dry.      Capillary Refill: Capillary refill takes less than 2 seconds.      Findings: No rash or wound.   Neurological:      General: No focal deficit present.      Mental Status: He is alert and oriented to person, place, and time. Mental status is at baseline.      Cranial Nerves: Cranial nerves 2-12 are intact.      Sensory: Sensation is intact.      Motor: Motor function is intact.      Coordination: Coordination is intact.   Psychiatric:          Behavior: Behavior is cooperative.         Results Reviewed       Procedure Component Value Units Date/Time    HS Troponin I 4hr [[id number]] Collected: 10/06/24 1249    Lab Status: In process Specimen: Blood from Arm, Right Updated: 10/06/24 1259    Sedimentation rate, automated [[id number]]  (Abnormal) Collected: 10/06/24 0836    Lab Status: Final result Specimen: Blood from Arm, Right Updated: 10/06/24 1224     Sed Rate 62 mm/hour     C-reactive protein [[id number]]  (Abnormal) Collected: 10/06/24 0836    Lab Status: Final result Specimen: Blood from Arm, Right Updated: 10/06/24 1223     CRP 31.7 mg/L     CK [[id number]]     Lab Status: No result Specimen: Blood     HS Troponin I 2hr [[id number]]  (Abnormal) Collected: 10/06/24 1103    Lab Status: Final result Specimen: Blood from Arm, Right Updated: 10/06/24 1132     hs TnI 2hr 129 ng/L      Delta 2hr hsTnI -6 ng/L     RBC Morphology Reflex Test [[id number]] Collected: 10/06/24 0836    Lab Status: Final result Specimen: Blood from Arm, Right Updated: 10/06/24 1001    CBC and differential [[id number]]  (Abnormal) Collected: 10/06/24 0836    Lab Status: Final result Specimen: Blood from Arm, Right Updated: 10/06/24 0935     WBC 12.00 Thousand/uL      RBC 4.50 Million/uL      Hemoglobin 11.8 g/dL      Hematocrit 37.2 %      MCV 83 fL      MCH 26.2 pg      MCHC 31.7 g/dL      RDW 18.5 %      MPV 10.1 fL      Platelets 428 Thousands/uL     Narrative:      This is an appended report.  These results have been appended to a previously verified report.    Manual Differential(PHLEBS Do Not Order) [[id number]]  (Abnormal) Collected: 10/06/24 0836    Lab Status: Final result Specimen: Blood from Arm, Right Updated: 10/06/24 0935     Segmented % 93 %      Lymphocytes % 5 %      Monocytes % 2 %      Eosinophils % 0 %      Basophils % 0 %      Absolute Neutrophils 11.16 Thousand/uL      Absolute Lymphocytes 0.60 Thousand/uL      Absolute Monocytes 0.24 Thousand/uL      Absolute  Eosinophils 0.00 Thousand/uL      Absolute Basophils 0.00 Thousand/uL      Total Counted --     RBC Morphology Normal     Platelet Estimate Adequate    HS Troponin 0hr (reflex protocol) [[id number]]  (Abnormal) Collected: 10/06/24 0836    Lab Status: Final result Specimen: Blood from Arm, Right Updated: 10/06/24 0904     hs TnI 0hr 135 ng/L     Comprehensive metabolic panel [[id number]]  (Abnormal) Collected: 10/06/24 0836    Lab Status: Final result Specimen: Blood from Arm, Right Updated: 10/06/24 0857     Sodium 138 mmol/L      Potassium 3.4 mmol/L      Chloride 93 mmol/L      CO2 26 mmol/L      ANION GAP 19 mmol/L      BUN 10 mg/dL      Creatinine 0.76 mg/dL      Glucose 208 mg/dL      Calcium 9.9 mg/dL      AST 17 U/L      ALT 11 U/L      Alkaline Phosphatase 83 U/L      Total Protein 7.8 g/dL      Albumin 4.3 g/dL      Total Bilirubin 0.41 mg/dL      eGFR 94 ml/min/1.73sq m     Narrative:      [employer] guidelines for Chronic Kidney Disease (CKD):     Stage 1 with normal or high GFR (GFR > 90 mL/min/1.73 square meters)    Stage 2 Mild CKD (GFR = 60-89 mL/min/1.73 square meters)    Stage 3A Moderate CKD (GFR = 45-59 mL/min/1.73 square meters)    Stage 3B Moderate CKD (GFR = 30-44 mL/min/1.73 square meters)    Stage 4 Severe CKD (GFR = 15-29 mL/min/1.73 square meters)    Stage 5 End Stage CKD (GFR <15 mL/min/1.73 square meters)  Note: GFR calculation is accurate only with a steady state creatinine    Lipase [[id number]]  (Abnormal) Collected: 10/06/24 0836    Lab Status: Final result Specimen: Blood from Arm, Right Updated: 10/06/24 0857     Lipase <6 u/L             CT abdomen pelvis with contrast   Final Interpretation by Alfredo Dalal MD (10/06 1143)      1. No acute findings in the abdomen or pelvis. No acute fracture.      2. Stable appearance of the lumbar spine compared to CT 9/20/2024.      3. Prostatomegaly with hypodense foci bilaterally. Please refer to previous MRI of the pelvis  from 8/17/2024. Correlate for any  suspicion of prostatitis.         Resident: KALEY IBARRA I, the attending radiologist, have reviewed the images and agree with the final report above.      Workstation performed: [id number]         XR hips bilateral with ap pelvis 2 vw   ED Interpretation by RUIZ Wang (10/06 0955)   No acute fracture identified by me.      CT recon only lumbar spine    (Results Pending)   CT head without contrast    (Results Pending)       ECG 12 Lead Documentation Only    Date/Time: 10/6/2024 8:55 AM    Performed by: RUIZ Wang  Authorized by: RUIZ Wang    Indications / Diagnosis:  Nausea and vomiting  ECG reviewed by me, the ED Provider: yes    Patient location:  ED  Previous ECG:     Previous ECG:  Compared to current    Comparison ECG info:  September 20, 2024    Similarity:  Changes noted (Sinus tachycardia has replaced sinus rhythm)    Comparison to cardiac monitor: Yes    Interpretation:     Interpretation: non-specific    Rate:     ECG rate:  109    ECG rate assessment: tachycardic    Rhythm:     Rhythm: sinus tachycardia    Ectopy:     Ectopy: none    QRS:     QRS axis:  Normal    QRS intervals:  Normal  Conduction:     Conduction: normal    ST segments:     ST segments:  Normal  T waves:     T waves: normal    Other findings:     Other findings: prolonged qTc interval    Comments:      Sinus tachycardia, normal axis, normal intervals, no acute ischemic changes read by me  ECG 12 Lead Documentation Only    Date/Time: 10/6/2024 12:15 PM    Performed by: RUIZ Wang  Authorized by: RUIZ Wang    Indications / Diagnosis:  Elevated troponin  ECG reviewed by me, the ED Provider: yes    Patient location:  ED  Previous ECG:     Previous ECG:  Compared to current    Comparison ECG info:  EKG from this morning, no acute changes    Comparison to cardiac monitor: Yes    Interpretation:     Interpretation: normal    Rate:     ECG rate:  97    ECG rate  assessment: normal    Rhythm:     Rhythm: sinus rhythm    Ectopy:     Ectopy: none    QRS:     QRS axis:  Normal    QRS intervals:  Normal  Conduction:     Conduction: normal    ST segments:     ST segments:  Normal  T waves:     T waves: normal    Comments:      Sinus rhythm, normal axis, normal intervals, no acute ischemic changes read by me      ED Medication and Procedure Management   Prior to Admission Medications   Prescriptions Last Dose Informant Patient Reported? Taking?   ACCU-CHEK FABIANO PLUS test strip  Spouse/Significant Other Yes No   Si (four) times a day   ACCU-CHEK FASTCLIX LANCETS MISC  Spouse/Significant Other Yes No   Si (four) times a day   GNP Aspirin Low Dose 81 MG EC tablet  Spouse/Significant Other Yes No   Sig: Take 1 tablet (81 mg total) by mouth daily Do not start before 2023.   Patient not taking: Reported on 2024   Jardiance 10 MG TABS  Spouse/Significant Other Yes No   Sig: Take 10 mg by mouth every morning   NIFEdipine (PROCARDIA XL) 30 mg 24 hr tablet   No No   Sig: Take 2 tablets (60 mg total) by mouth 2 (two) times a day   acetaminophen (TYLENOL) 500 mg tablet  Spouse/Significant Other No No   Sig: Take 2 tablets (1,000 mg total) by mouth every 8 (eight) hours 500 mg tablet   clonazePAM (KlonoPIN) 1 mg tablet  Spouse/Significant Other Yes No   Sig: Take 1 mg by mouth 2 (two) times a day & 3rd pill PRN   doxycycline hyclate (VIBRAMYCIN) 100 mg capsule   Yes No   Sig: Take 100 mg by mouth 2 (two) times a day Do not start before August 10, 2024.   escitalopram (Lexapro) 20 mg tablet   Yes No   Sig: Take 20 mg by mouth daily. Indications: .   finasteride (PROSCAR) 5 mg tablet   No No   Sig: TAKE 1 TABLET BY MOUTH DAILY   folic acid (FOLVITE) 1 mg tablet  Spouse/Significant Other Yes No   Sig: Take 2,000 mcg by mouth daily   furosemide (LASIX) 20 mg tablet   No No   Sig: Take 1 tablet (20 mg total) by mouth daily as needed (for weight gain more than 3 lbs overnight  or more than 5 lbs in a week, or lower extremity swelling)   gabapentin (NEURONTIN) 100 mg capsule   No No   Sig: Take 1 capsule (100 mg total) by mouth daily   hydrOXYzine pamoate (VISTARIL) 50 mg capsule  Spouse/Significant Other Yes No   Sig: Take 50 mg by mouth 2 (two) times a day. Indications: Feeling Anxious   lidocaine (LIDODERM) 5 %  Spouse/Significant Other No No   Sig: Apply 1 patch topically over 12 hours daily Remove & Discard patch within 12 hours or as directed by MD   lisinopril (ZESTRIL) 5 mg tablet   No No   Sig: Take 1 tablet (5 mg total) by mouth daily   Patient not taking: Reported on 9/6/2024   lovastatin (MEVACOR) 20 mg tablet  Spouse/Significant Other Yes No   Sig: Take 20 mg by mouth every morning   metFORMIN (GLUCOPHAGE) 1000 MG tablet  Spouse/Significant Other Yes No   Sig: Take 1,000 mg by mouth 2 (two) times a day with meals    methocarbamol (ROBAXIN) 750 mg tablet  Spouse/Significant Other No No   Sig: Take 1 tablet (750 mg total) by mouth every 6 (six) hours as needed for muscle spasms   metoprolol succinate (TOPROL-XL) 50 mg 24 hr tablet  Spouse/Significant Other Yes No   Sig: Take 50 mg by mouth 2 (two) times a day   mirtazapine (REMERON) 45 MG tablet  Spouse/Significant Other Yes No   Sig: Take 45 mg by mouth daily at bedtime   ondansetron (Zofran ODT) 4 mg disintegrating tablet  Spouse/Significant Other No No   Sig: Take 1 tablet (4 mg total) by mouth every 6 (six) hours as needed for nausea or vomiting   oxyCODONE (ROXICODONE) 5 immediate release tablet   No No   Sig: Take 1 tablet (5 mg total) by mouth every 6 (six) hours as needed for severe pain Dr. Katie MELHCOR Max Daily Amount: 20 mg   patient supplied medication  Spouse/Significant Other Yes No   Sig: medical marijuana vape as needed per latest dci  Indications: .   senna (SENOKOT) 8.6 mg  Spouse/Significant Other No No   Sig: Take 1 tablet (8.6 mg total) by mouth 2 (two) times a day 2 tabs at bedtime as needed for  constipation per latest dci   tamsulosin (FLOMAX) 0.4 mg   No No   Sig: TAKE 1 CAPSULE BY MOUTH DAILY WITH DINNER      Facility-Administered Medications: None     Patient's Medications   Discharge Prescriptions    No medications on file     No discharge procedures on file.  ED SEPSIS DOCUMENTATION   Time reflects when diagnosis was documented in both MDM as applicable and the Disposition within this note       Time User Action Codes Description Comment    10/6/2024 12:39 PM Syl Casas Add [M54.50] Low back pain     10/6/2024 12:39 PM Syl Casas Add [W19.XXXA] Fall, initial encounter     10/6/2024 12:39 PM Syl Casas Add [M25.551,  M25.552] Bilateral hip pain     10/6/2024 12:39 PM Syl Casas Add [R11.2] Nausea and vomiting     10/6/2024 12:56 PM Syl Casas Add [R79.89] Elevated troponin            Initial Sepsis Screening       Row Name 10/06/24 0845                Is the patient's history suggestive of a new or worsening infection? No  -TS                  User Key  (r) = Recorded By, (t) = Taken By, (c) = Cosigned By      Initials Name Provider Type    TS RUIZ Wang Nurse Practitioner                       RUIZ Wang  10/06/24 1259       RUIZ Wang  10/06/24 1300

## 2024-10-07 ENCOUNTER — HOME HEALTH ADMISSION (OUTPATIENT)
Dept: HOME HEALTH SERVICES | Facility: HOME HEALTHCARE | Age: 69
End: 2024-10-07
Payer: COMMERCIAL

## 2024-10-07 LAB
ANION GAP SERPL CALCULATED.3IONS-SCNC: 10 MMOL/L (ref 4–13)
ATRIAL RATE: 109 BPM
BACTERIA UR QL AUTO: ABNORMAL /HPF
BASE EX.OXY STD BLDV CALC-SCNC: 91.3 % (ref 60–80)
BASE EXCESS BLDV CALC-SCNC: 1.6 MMOL/L
BASOPHILS # BLD AUTO: 0.04 THOUSANDS/ΜL (ref 0–0.1)
BASOPHILS NFR BLD AUTO: 0 % (ref 0–1)
BILIRUB UR QL STRIP: NEGATIVE
BUN SERPL-MCNC: 9 MG/DL (ref 5–25)
CALCIUM SERPL-MCNC: 9 MG/DL (ref 8.4–10.2)
CARDIAC TROPONIN I PNL SERPL HS: 135 NG/L (ref 8–18)
CHLORIDE SERPL-SCNC: 100 MMOL/L (ref 96–108)
CLARITY UR: CLEAR
CO2 SERPL-SCNC: 29 MMOL/L (ref 21–32)
COLOR UR: YELLOW
CREAT SERPL-MCNC: 0.69 MG/DL (ref 0.6–1.3)
EOSINOPHIL # BLD AUTO: 0.05 THOUSAND/ΜL (ref 0–0.61)
EOSINOPHIL NFR BLD AUTO: 1 % (ref 0–6)
ERYTHROCYTE [DISTWIDTH] IN BLOOD BY AUTOMATED COUNT: 18.9 % (ref 11.6–15.1)
EST. AVERAGE GLUCOSE BLD GHB EST-MCNC: 123 MG/DL
GFR SERPL CREATININE-BSD FRML MDRD: 98 ML/MIN/1.73SQ M
GLUCOSE P FAST SERPL-MCNC: 169 MG/DL (ref 65–99)
GLUCOSE SERPL-MCNC: 123 MG/DL (ref 65–140)
GLUCOSE SERPL-MCNC: 128 MG/DL (ref 65–140)
GLUCOSE SERPL-MCNC: 148 MG/DL (ref 65–140)
GLUCOSE SERPL-MCNC: 169 MG/DL (ref 65–140)
GLUCOSE SERPL-MCNC: 179 MG/DL (ref 65–140)
GLUCOSE UR STRIP-MCNC: ABNORMAL MG/DL
HBA1C MFR BLD: 5.9 %
HCO3 BLDV-SCNC: 25.4 MMOL/L (ref 24–30)
HCT VFR BLD AUTO: 37.1 % (ref 36.5–49.3)
HGB BLD-MCNC: 11.8 G/DL (ref 12–17)
HGB UR QL STRIP.AUTO: NEGATIVE
IMM GRANULOCYTES # BLD AUTO: 0.07 THOUSAND/UL (ref 0–0.2)
IMM GRANULOCYTES NFR BLD AUTO: 1 % (ref 0–2)
KETONES UR STRIP-MCNC: ABNORMAL MG/DL
LEUKOCYTE ESTERASE UR QL STRIP: ABNORMAL
LYMPHOCYTES # BLD AUTO: 1.5 THOUSANDS/ΜL (ref 0.6–4.47)
LYMPHOCYTES NFR BLD AUTO: 14 % (ref 14–44)
MAGNESIUM SERPL-MCNC: 1.9 MG/DL (ref 1.9–2.7)
MCH RBC QN AUTO: 26.2 PG (ref 26.8–34.3)
MCHC RBC AUTO-ENTMCNC: 31.8 G/DL (ref 31.4–37.4)
MCV RBC AUTO: 82 FL (ref 82–98)
MONOCYTES # BLD AUTO: 0.77 THOUSAND/ΜL (ref 0.17–1.22)
MONOCYTES NFR BLD AUTO: 7 % (ref 4–12)
MUCOUS THREADS UR QL AUTO: ABNORMAL
NEUTROPHILS # BLD AUTO: 8.04 THOUSANDS/ΜL (ref 1.85–7.62)
NEUTS SEG NFR BLD AUTO: 77 % (ref 43–75)
NITRITE UR QL STRIP: NEGATIVE
NON-SQ EPI CELLS URNS QL MICRO: ABNORMAL /HPF
NRBC BLD AUTO-RTO: 0 /100 WBCS
O2 CT BLDV-SCNC: 16.3 ML/DL
PCO2 BLDV: 37.2 MM HG (ref 42–50)
PH BLDV: 7.45 [PH] (ref 7.3–7.4)
PH UR STRIP.AUTO: 7 [PH]
PLATELET # BLD AUTO: 436 THOUSANDS/UL (ref 149–390)
PMV BLD AUTO: 9.8 FL (ref 8.9–12.7)
PO2 BLDV: 70.5 MM HG (ref 35–45)
POTASSIUM SERPL-SCNC: 3.7 MMOL/L (ref 3.5–5.3)
PR INTERVAL: 152 MS
PROT UR STRIP-MCNC: ABNORMAL MG/DL
QRS AXIS: 80 DEGREES
QRSD INTERVAL: 74 MS
QT INTERVAL: 366 MS
QTC INTERVAL: 492 MS
RBC # BLD AUTO: 4.5 MILLION/UL (ref 3.88–5.62)
RBC #/AREA URNS AUTO: ABNORMAL /HPF
SODIUM SERPL-SCNC: 139 MMOL/L (ref 135–147)
SP GR UR STRIP.AUTO: 1.01 (ref 1–1.03)
T WAVE AXIS: 80 DEGREES
UROBILINOGEN UR STRIP-ACNC: <2 MG/DL
VENTRICULAR RATE: 109 BPM
WBC # BLD AUTO: 10.47 THOUSAND/UL (ref 4.31–10.16)
WBC #/AREA URNS AUTO: ABNORMAL /HPF

## 2024-10-07 PROCEDURE — 85025 COMPLETE CBC W/AUTO DIFF WBC: CPT | Performed by: PHYSICIAN ASSISTANT

## 2024-10-07 PROCEDURE — 84484 ASSAY OF TROPONIN QUANT: CPT | Performed by: PHYSICIAN ASSISTANT

## 2024-10-07 PROCEDURE — 82805 BLOOD GASES W/O2 SATURATION: CPT | Performed by: PHYSICIAN ASSISTANT

## 2024-10-07 PROCEDURE — 82948 REAGENT STRIP/BLOOD GLUCOSE: CPT

## 2024-10-07 PROCEDURE — 93010 ELECTROCARDIOGRAM REPORT: CPT | Performed by: INTERNAL MEDICINE

## 2024-10-07 PROCEDURE — 81001 URINALYSIS AUTO W/SCOPE: CPT | Performed by: PHYSICIAN ASSISTANT

## 2024-10-07 PROCEDURE — 83735 ASSAY OF MAGNESIUM: CPT | Performed by: PHYSICIAN ASSISTANT

## 2024-10-07 PROCEDURE — 87086 URINE CULTURE/COLONY COUNT: CPT | Performed by: PHYSICIAN ASSISTANT

## 2024-10-07 PROCEDURE — 99233 SBSQ HOSP IP/OBS HIGH 50: CPT | Performed by: PHYSICIAN ASSISTANT

## 2024-10-07 PROCEDURE — 80048 BASIC METABOLIC PNL TOTAL CA: CPT | Performed by: PHYSICIAN ASSISTANT

## 2024-10-07 PROCEDURE — 97167 OT EVAL HIGH COMPLEX 60 MIN: CPT

## 2024-10-07 PROCEDURE — 97163 PT EVAL HIGH COMPLEX 45 MIN: CPT

## 2024-10-07 RX ORDER — OXYCODONE HYDROCHLORIDE 5 MG/1
5 TABLET ORAL EVERY 4 HOURS PRN
Status: DISCONTINUED | OUTPATIENT
Start: 2024-10-07 | End: 2024-10-08 | Stop reason: HOSPADM

## 2024-10-07 RX ADMIN — OXYCODONE HYDROCHLORIDE 5 MG: 5 TABLET ORAL at 08:22

## 2024-10-07 RX ADMIN — FOLIC ACID 2000 MCG: 1 TABLET ORAL at 08:22

## 2024-10-07 RX ADMIN — NIFEDIPINE 120 MG: 30 TABLET, FILM COATED, EXTENDED RELEASE ORAL at 08:23

## 2024-10-07 RX ADMIN — ASPIRIN 81 MG: 81 TABLET, COATED ORAL at 08:22

## 2024-10-07 RX ADMIN — PRAVASTATIN SODIUM 20 MG: 20 TABLET ORAL at 18:08

## 2024-10-07 RX ADMIN — METOPROLOL SUCCINATE 50 MG: 50 TABLET, EXTENDED RELEASE ORAL at 08:23

## 2024-10-07 RX ADMIN — OXYCODONE HYDROCHLORIDE 5 MG: 5 TABLET ORAL at 02:23

## 2024-10-07 RX ADMIN — OXYCODONE HYDROCHLORIDE 5 MG: 5 TABLET ORAL at 22:08

## 2024-10-07 RX ADMIN — ESCITALOPRAM OXALATE 20 MG: 20 TABLET ORAL at 08:23

## 2024-10-07 RX ADMIN — METHOCARBAMOL TABLETS 750 MG: 500 TABLET, COATED ORAL at 10:35

## 2024-10-07 RX ADMIN — LIDOCAINE 1 PATCH: 700 PATCH TOPICAL at 08:28

## 2024-10-07 RX ADMIN — TRAMADOL HYDROCHLORIDE 50 MG: 50 TABLET, COATED ORAL at 20:43

## 2024-10-07 RX ADMIN — PANTOPRAZOLE SODIUM 40 MG: 40 INJECTION, POWDER, FOR SOLUTION INTRAVENOUS at 22:08

## 2024-10-07 RX ADMIN — OXYCODONE HYDROCHLORIDE 5 MG: 5 TABLET ORAL at 18:09

## 2024-10-07 RX ADMIN — CLONAZEPAM 1 MG: 1 TABLET ORAL at 08:23

## 2024-10-07 RX ADMIN — TRAMADOL HYDROCHLORIDE 50 MG: 50 TABLET, COATED ORAL at 00:43

## 2024-10-07 RX ADMIN — TRIMETHOBENZAMIDE HYDROCHLORIDE 200 MG: 100 INJECTION INTRAMUSCULAR at 12:05

## 2024-10-07 RX ADMIN — INSULIN LISPRO 1 UNITS: 100 INJECTION, SOLUTION INTRAVENOUS; SUBCUTANEOUS at 22:12

## 2024-10-07 RX ADMIN — ENOXAPARIN SODIUM 40 MG: 100 INJECTION SUBCUTANEOUS at 08:27

## 2024-10-07 RX ADMIN — FINASTERIDE 5 MG: 5 TABLET, FILM COATED ORAL at 08:23

## 2024-10-07 RX ADMIN — CLONAZEPAM 1 MG: 1 TABLET ORAL at 18:08

## 2024-10-07 RX ADMIN — ONDANSETRON 4 MG: 2 INJECTION INTRAMUSCULAR; INTRAVENOUS at 09:05

## 2024-10-07 RX ADMIN — GABAPENTIN 100 MG: 100 CAPSULE ORAL at 08:23

## 2024-10-07 RX ADMIN — SENNOSIDES 8.6 MG: 8.6 TABLET, FILM COATED ORAL at 18:09

## 2024-10-07 RX ADMIN — TRIMETHOBENZAMIDE HYDROCHLORIDE 200 MG: 100 INJECTION INTRAMUSCULAR at 22:08

## 2024-10-07 RX ADMIN — PANTOPRAZOLE SODIUM 40 MG: 40 INJECTION, POWDER, FOR SOLUTION INTRAVENOUS at 08:21

## 2024-10-07 RX ADMIN — METOPROLOL SUCCINATE 50 MG: 50 TABLET, EXTENDED RELEASE ORAL at 22:08

## 2024-10-07 RX ADMIN — MIRTAZAPINE 45 MG: 15 TABLET, FILM COATED ORAL at 22:07

## 2024-10-07 RX ADMIN — OXYCODONE HYDROCHLORIDE 5 MG: 5 TABLET ORAL at 13:14

## 2024-10-07 RX ADMIN — SENNOSIDES 8.6 MG: 8.6 TABLET, FILM COATED ORAL at 08:23

## 2024-10-07 RX ADMIN — Medication 6 MG: at 22:08

## 2024-10-07 RX ADMIN — TRAMADOL HYDROCHLORIDE 50 MG: 50 TABLET, COATED ORAL at 10:35

## 2024-10-07 RX ADMIN — ACETAMINOPHEN 650 MG: 325 TABLET ORAL at 02:42

## 2024-10-07 RX ADMIN — TAMSULOSIN HYDROCHLORIDE 0.4 MG: 0.4 CAPSULE ORAL at 18:08

## 2024-10-07 NOTE — PROGRESS NOTES
Progress Note - Hospitalist   Name: Juan San 69 y.o. male I MRN: [id number]  Unit/Bed#: -01 I Date of Admission: 10/6/2024   Date of Service: 10/7/2024 I Hospital Day: 0    Assessment & Plan  Hypertensive urgency  Resolved  Presented to the emergency department following mechanical fall and subsequent hip pain  Accelerated blood pressures on admission, SBP in the 200s  S/p IV labetalol x 1  CT head negative for acute intracranial abnormalities  Home regimen includes Toprol XL 50 mg twice daily, Procardia 60 mg twice daily -continue this antihypertensive regimen  May also be driven by poor pain control  Monitor BP per unit protocol  Nausea and vomiting  Reports recurrent episodes intermittently at home, he feels related to excessive post nasal drip but he also admits to frequent use ( medical MJ for back pain)  Suspect this may be cyclic vomiting syndrome  CTH negative but does show mucus retention cysts in maxillary sinuses, reports he has been on Zyrtec with little relief  CT abdomen/pelvis unremarkable  Continue supportive care with IVF, antiemetics  Advancing to full liquids this evening   May benefit from ENT referral on discharge for treatment/management of post nasal drip and sinus cysts  Discussed with pt and wife that if these episodes continue to recur, may consider stopping MJ use as it could be triggering cyclic vomiting  Elevated troponin I level  Elevated troponin 135-->129-->143-->135  EKG without ischemic changes  Troponins peaked  Suspect in setting of elevated blood pressures  Monitor on telemetry  Chronic pain syndrome  Home regimen includes tramadol 50 mg every 8 hours as needed  Last filled 10/3/2024 per PDMP  Difficulty with acute on chronic pain control presently following fall, has required oxycodone for severe pain  May need to follow up with pain management on discharge  Type 2 diabetes mellitus with hyperglycemia, without long-term current use of insulin (HCC)  Lab Results    Component Value Date    HGBA1C 5.9 (H) 10/06/2024       Recent Labs     10/06/24  1629 10/06/24  2055 10/07/24  0721 10/07/24  1138   POCGLU 131 149* 123 148*       Blood Sugar Average: Last 72 hrs:  (P) 137.75Hold metformin, Jardiance  SSI plus Accu-Chek  Updated A1c 5.9    Thyroid cancer (HCC)  S/p partial thyroidectomy  Not maintained on Synthroid  Follows with Horacio Jackson  Benign prostatic hyperplasia with urinary frequency  Continue Flomax, Proscar  Ambulatory dysfunction  Presented to the emergency department with bilateral hip pain following mechanical fall  CT recon lumbar spine reveals extensive disc disease but unchanged from prior study in September   Continue pain control, difficulty achieving adequate control today, regimen adjusted  Pt has back surgery planned in near future per pt's wife  Fall precautions  PT/OT evaluation-level III  SIRS (systemic inflammatory response syndrome) ([facility])  Tachycardia resolved and leukocytosis nearly resolved without abx  Patient met SIRS criteria with tachycardia, leukocytosis in setting of vomiting  CT abdomen/pelvis without acute findings with exception of hypodensities on prostate  UA bland  No e/o prostatitis  Trend WBC and fever curve  Monitor off antibiotics, low suspicion for infectious process  Hypokalemia  Potassium 3.4, mag 1.2  Resolved following repletion  Repeat BMP  Metabolic acidosis, increased anion gap  Anion gap noted to be 19 on admission, resolved   Lactic acid wnl  BHB elevated   Suspect related to recent vomiting/starvation,  S/p 1 L IVF in the ED and maintenance IVF  Unclear accuracy of repeat VBG today, reveals alkalosis, AG closed on BMP today  Diet advanced, if tolerating will DC IVF and check AM BMP    VTE Pharmacologic Prophylaxis: VTE Score: 3 Moderate Risk (Score 3-4) - Pharmacological DVT Prophylaxis Ordered: enoxaparin (Lovenox).    Mobility:   Basic Mobility Inpatient Raw Score: 22  -HLM Goal: 7: Walk 25 feet or more  JH-HLM Achieved:  7: Walk 25 feet or more  -[facility] Goal achieved. Continue to encourage appropriate mobility.    Patient Centered Rounds: I performed bedside rounds with nursing staff today.   Discussions with Specialists or Other Care Team Provider: case management, nursing    Education and Discussions with Family / Patient: Updated  (wife) via phone.    Current Length of Stay: 0 day(s)  Current Patient Status: Inpatient   Certification Statement: The patient will continue to require additional inpatient hospital stay due to awaiting tolerance of diet advancement and improvement inhip pain  Discharge Plan: Anticipate discharge in 24-48 hrs to home.    Code Status: Level 1 - Full Code    Subjective   Patient reports being nauseous all morning although he denies any vomiting.  He tells me he has not eaten for 3 days but he wants to try some clears this morning.  Denies abdominal pain.  Reports his last bowel movement was 2 days ago and was normal.  He is having severe bilateral hip pain after falling at home.  He feels his nausea is related to continued postnasal drip.  He says he wakes up in the morning and feels he has mucus in his throat and some reflux symptoms.  He has tried Zyrtec with little improvement.    Objective :  Temp:  [98.7 °F (37.1 °C)-99.4 °F (37.4 °C)] 99.4 °F (37.4 °C)  HR:  [72-76] 72  BP: (126-164)/(62-83) 132/73  Resp:  [15-20] 15  SpO2:  [95 %-99 %] 99 %  O2 Device: None (Room air)    Body mass index is 27.32 kg/m².     Input and Output Summary (last 24 hours):     Intake/Output Summary (Last 24 hours) at 10/7/2024 1555  Last data filed at 10/7/2024 1139  Gross per 24 hour   Intake 360 ml   Output 500 ml   Net -140 ml       Physical Exam  Vitals and nursing note reviewed.   Constitutional:       General: He is not in acute distress.     Appearance: Normal appearance. He is normal weight. He is not ill-appearing or toxic-appearing.   HENT:      Head: Normocephalic and atraumatic.      Right Ear:  External ear normal.      Left Ear: External ear normal.      Nose: Nose normal.      Mouth/Throat:      Mouth: Mucous membranes are moist.      Pharynx: Oropharynx is clear.   Eyes:      Conjunctiva/sclera: Conjunctivae normal.   Cardiovascular:      Rate and Rhythm: Normal rate and regular rhythm.      Pulses: Normal pulses.      Heart sounds: Normal heart sounds. No murmur heard.     No gallop.   Pulmonary:      Effort: Pulmonary effort is normal. No respiratory distress.      Breath sounds: Normal breath sounds. No stridor. No wheezing, rhonchi or rales.   Abdominal:      General: Abdomen is flat. Bowel sounds are normal. There is no distension.      Palpations: Abdomen is soft. There is no mass.      Tenderness: There is no abdominal tenderness. There is no guarding or rebound.      Hernia: No hernia is present.   Musculoskeletal:         General: Tenderness present.      Cervical back: Normal range of motion.      Comments: Diffusely tender over bilateral hip joint laterally, no deformity noted. Full ROM   Skin:     General: Skin is warm.   Neurological:      Mental Status: He is alert. Mental status is at baseline.   Psychiatric:         Mood and Affect: Mood normal.           Lines/Drains:        Telemetry:  Telemetry Orders (From admission, onward)               24 Hour Telemetry Monitoring  Continuous x 24 Hours (Telem)           Question:  Reason for 24 Hour Telemetry  Answer:  Syncope suspected to be cardiac in origin                     Telemetry Reviewed: Normal Sinus Rhythm  Indication for Continued Telemetry Use: No indication for continued use. Will discontinue.                Lab Results: I have reviewed the following results:   Results from last 7 days   Lab Units 10/07/24  0904   WBC Thousand/uL 10.47*   HEMOGLOBIN g/dL 11.8*   HEMATOCRIT % 37.1   PLATELETS Thousands/uL 436*   SEGS PCT % 77*   LYMPHO PCT % 14   MONO PCT % 7   EOS PCT % 1     Results from last 7 days   Lab Units  10/07/24  0904 10/06/24  1536 10/06/24  0836   SODIUM mmol/L 139   < > 138   POTASSIUM mmol/L 3.7   < > 3.4*   CHLORIDE mmol/L 100   < > 93*   CO2 mmol/L 29   < > 26   BUN mg/dL 9   < > 10   CREATININE mg/dL 0.69   < > 0.76   ANION GAP mmol/L 10   < > 19*   CALCIUM mg/dL 9.0   < > 9.9   ALBUMIN g/dL  --   --  4.3   TOTAL BILIRUBIN mg/dL  --   --  0.41   ALK PHOS U/L  --   --  83   ALT U/L  --   --  11   AST U/L  --   --  17   GLUCOSE RANDOM mg/dL 169*   < > 208*    < > = values in this interval not displayed.         Results from last 7 days   Lab Units 10/07/24  1138 10/07/24  0721 10/06/24  2055 10/06/24  1629   POC GLUCOSE mg/dl 148* 123 149* 131     Results from last 7 days   Lab Units 10/06/24  0836   HEMOGLOBIN A1C % 5.9*     Results from last 7 days   Lab Units 10/06/24  1536   LACTIC ACID mmol/L 1.1       Recent Cultures (last 7 days):         Imaging Results Review: I reviewed radiology reports from this admission including: CT abdomen/pelvis.  Other Study Results Review: EKG was reviewed.     Last 24 Hours Medication List:     Current Facility-Administered Medications:     acetaminophen (TYLENOL) tablet 650 mg, Q6H PRN    aspirin (ECOTRIN LOW STRENGTH) EC tablet 81 mg, Daily    clonazePAM (KlonoPIN) tablet 1 mg, BID    enoxaparin (LOVENOX) subcutaneous injection 40 mg, Daily    escitalopram (LEXAPRO) tablet 20 mg, Daily    finasteride (PROSCAR) tablet 5 mg, Daily    folic acid (FOLVITE) tablet 2,000 mcg, Daily    gabapentin (NEURONTIN) capsule 100 mg, Daily    insulin lispro (HumALOG/ADMELOG) 100 units/mL subcutaneous injection 1-6 Units, TID AC **AND** Fingerstick Glucose (POCT), TID AC    insulin lispro (HumALOG/ADMELOG) 100 units/mL subcutaneous injection 1-6 Units, HS    lidocaine (LIDODERM) 5 % patch 1 patch, Daily    melatonin tablet 6 mg, HS    methocarbamol (ROBAXIN) tablet 750 mg, Q6H PRN    metoprolol succinate (TOPROL-XL) 24 hr tablet 50 mg, BID    mirtazapine (REMERON) tablet 45 mg, HS     NIFEdipine (PROCARDIA XL) 24 hr tablet 120 mg, Daily    ondansetron (ZOFRAN) injection 4 mg, Q6H PRN    oxyCODONE (ROXICODONE) IR tablet 5 mg, Q4H PRN    pantoprazole (PROTONIX) injection 40 mg, Q12H CHRISTIE    pravastatin (PRAVACHOL) tablet 20 mg, Daily With Dinner    senna (SENOKOT) tablet 8.6 mg, BID    tamsulosin (FLOMAX) capsule 0.4 mg, Daily With Dinner    traMADol (ULTRAM) tablet 50 mg, Q6H PRN    trimethobenzamide (TIGAN) IM injection 200 mg, Q6H PRN    Administrative Statements   Today, Patient Was Seen By: Betty Li PA-C  I have spent a total time of 45 minutes in caring for this patient on the day of the visit/encounter including Diagnostic results, Prognosis, Risks and benefits of tx options, Instructions for management, Impressions, Counseling / Coordination of care, Documenting in the medical record, Reviewing / ordering tests, medicine, procedures  , Obtaining or reviewing history  , and Communicating with other healthcare professionals .    **Please Note: This note may have been constructed using a voice recognition system.**

## 2024-10-07 NOTE — ASSESSMENT & PLAN NOTE
Resolved  Presented to the emergency department following mechanical fall and subsequent hip pain  Accelerated blood pressures on admission, SBP in the 200s  S/p IV labetalol x 1  CT head negative for acute intracranial abnormalities  Home regimen includes Toprol XL 50 mg twice daily, Procardia 60 mg twice daily -continue this antihypertensive regimen  May also be driven by poor pain control  Monitor BP per unit protocol

## 2024-10-07 NOTE — ASSESSMENT & PLAN NOTE
Home regimen includes tramadol 50 mg every 8 hours as needed  Last filled 10/3/2024 per PDMP  Difficulty with acute on chronic pain control presently following fall, has required oxycodone for severe pain  May need to follow up with pain management on discharge

## 2024-10-07 NOTE — ASSESSMENT & PLAN NOTE
Elevated troponin 135-->129-->143-->135  EKG without ischemic changes  Troponins peaked  Suspect in setting of elevated blood pressures  Monitor on telemetry

## 2024-10-07 NOTE — PHYSICAL THERAPY NOTE
PHYSICAL THERAPY EVALUATION NOTE      Patient Name: Juan San  Today's Date: 10/7/2024    AGE:   69 y.o.  Mrn:   [id number]  ADMIT DX:  Low back pain [M54.50]  Back pain [M54.9]  Nausea and vomiting [R11.2]  Elevated troponin [R79.89]  Bilateral hip pain [M25.551, M25.552]  Fall, initial encounter [W19.XXXA]    Past Medical History:   Diagnosis Date    Anxiety     Arthritis     Cancer (HCC)     Chronic back pain     Depression     Diabetes mellitus (HCC)     Hypertension     Liver disease     Mitral valve prolapse     Peripheral neuropathy     Neuropathy    PONV (postoperative nausea and vomiting)     Thyroid disease      Length Of Stay: 0  PHYSICAL THERAPY EVALUATION :   Patient's identity confirmed via 2 patient identifiers (full name and ) at start of session       10/07/24 1007   PT Last Visit   PT Visit Date 10/07/24   Note Type   Note type Evaluation   Pain Assessment   Pain Assessment Tool 0-10   Pain Score 10 - Worst Possible Pain   Pain Location/Orientation Orientation: Lower;Location: Back  (chronic LBP)   Effect of Pain on Daily Activities limits mobility and quality of movements   Patient's Stated Pain Goal No pain   Hospital Pain Intervention(s) Repositioned;Ambulation/increased activity;Emotional support   Restrictions/Precautions   Weight Bearing Precautions Per Order No   Other Precautions Chair Alarm;Bed Alarm;Fall Risk;Pain   Home Living   Type of Home House   Home Layout One level;Stairs to enter with rails  (1 CECILIO)   Home Equipment Walker;Cane   Additional Comments Pt reports using RW at baseline for functional mobility   Prior Function   Level of [place] Independent with ADLs;Independent with functional mobility   Lives With Spouse   Receives Help From Family   IADLs Family/Friend/Other provides transportation   Falls in the last 6 months 1 to 4  (1; reason for admission)   Comments Usually I w/ ADLs  but reports his SO assists when he has flare ups   General   Family/Caregiver Present No   Cognition   Overall Cognitive Status WFL   Arousal/Participation Alert   Attention Within functional limits   Orientation Level Oriented X4   Memory Within functional limits   Following Commands Follows all commands and directions without difficulty   Comments Patient is pleasant and agreeable to participate in PT evaluation. Reports he's been OOB multiple times already   RLE Assessment   RLE Assessment WFL   Strength RLE   RLE Overall Strength 4-/5   LLE Assessment   LLE Assessment WFL   Strength LLE   LLE Overall Strength 4-/5   Bed Mobility   Rolling L 6  Modified independent   Supine to Sit 6  Modified independent   Transfers   Sit to Stand 6  Modified independent   Stand to Sit 6  Modified independent   Additional Comments Performed STS x 2 from EOB w/ consistent modified I w/ increased time   Ambulation/Elevation   Gait pattern Decreased foot clearance;Antalgic;Short stride;Knees flexed   Gait Assistance 5  Supervision   Additional items Assist x 1   Assistive Device Rolling walker   Distance 35 ft   Ambulation/Elevation Additional Comments Pt w/ no LOB or unsteadiness during mobility   Balance   Static Sitting Fair +   Static Standing Fair   Ambulatory Fair -   Activity Tolerance   Activity Tolerance (S)  Patient limited by pain  (main limiting factor)   Medical Staff Made Aware LIAM Bangura   Nurse Made Aware NATE Gee   Assessment   Problem List Decreased strength;Decreased endurance;Decreased mobility;Impaired balance;Pain   Assessment Juan San is a 69 y.o. Male who presents to [facility] on 10/6/2024 from home w/ c/o fall w/ worsening acute on chronic LBP and diagnosis of HTN urgency. Orders for PT eval and treat received. Pt presents w/ comorbidities of CPS, BPH, DMII, chronic low back pain w/ sciatica w/ recent hx of lumbar spinal fusion, anxiety and depression. At baseline, pt mobilizes modified I w/ RW, and reports  + falls in the last 6 months. Upon evaluation, pt presents w/ the following deficits: weakness, altered sensation, impaired balance, decreased endurance, and pain limiting functional mobility. Upon eval, pt requires modified I for bed mobility, modified I for transfers, and supervision for gait. Based on this PT evaluation today, patient's discharge recommendation is for Level III - Low Rehab Resource Intensity. During this admission, pt would benefit from continued skilled inpatient PT in the acute care setting in order to address the abovementioned deficits to maximize function and mobility before DC from acute care.   Goals   Patient Goals to have less pain   STG Expiration Date 10/17/24   Short Term Goal #1 Patient will: Perform all bed mobility tasks independently to improve pt's independence w/ repositioning for decrease risk of skin breakdown, Perform all transfers independently consistently from various height surfaces in order to improve I w/ engagement w/ real-world environments/situations, Ambulate at least 100 ft. with roller walker modified independent w/o LOB to facilitate return and engagement w/ previous living environment, and Navigate 1 stairs w/ supervision with unilateral handrail to either improve independence w/ entering home and/or so patient can fully access living areas in home   PT Treatment Day 0   Plan   Treatment/Interventions Functional transfer training;LE strengthening/ROM;Elevations;Therapeutic exercise;Endurance training;Patient/family training;Equipment eval/education;Bed mobility;Gait training   PT Frequency 1-2x/wk   Discharge Recommendation   Rehab Resource Intensity Level, PT III (Minimum Resource Intensity)   Equipment Recommended Walker   AM-PAC Basic Mobility Inpatient   Turning in Flat Bed Without Bedrails 4   Lying on Back to Sitting on Edge of Flat Bed Without Bedrails 4   Moving Bed to Chair 4   Standing Up From Chair Using Arms 4   Walk in Room 3   Climb 3-5 Stairs With  [place] 3   Basic Mobility Inpatient Raw Score 22   Basic Mobility Standardized Score 47.4   [facility] Highest Level Of Mobility   -HL Goal 7: Walk 25 feet or more   -HLM Achieved 7: Walk 25 feet or more   End of Consult   Patient Position at End of Consult Bedside chair;Bed/Chair alarm activated;All needs within reach         The patient's AM-PAC Basic Mobility Inpatient Short Form Raw Score is 22, Standardized Score is 47.4. A standardized score greater than 38.32 (raw score of 16) suggests the patient may benefit from discharge to home which may not coincide with above PT recommendations. However please refer to therapist recommendation for discharge planning given other factors that may influence destination.    Pt would benefit from skilled inpatient PT during this admission in order to facilitate progress towards goals to maximize functional independence    Lea Johnson PT, DPT

## 2024-10-07 NOTE — CASE MANAGEMENT
Case Management Discharge Planning Note    Patient name Juan San  Location /-01 MRN [id number]  : 1955 Date 10/7/2024       Current Admission Date: 10/6/2024  Current Admission Diagnosis:Nausea and vomiting   Patient Active Problem List    Diagnosis Date Noted Date Diagnosed    Elevated troponin I level 10/06/2024     Ambulatory dysfunction 10/06/2024     SIRS (systemic inflammatory response syndrome) ([facility]) 10/06/2024     Hypokalemia 10/06/2024     Metabolic acidosis, increased anion gap 10/06/2024     Prostatitis 2024     Lower extremity edema 2024     Venous stasis ulcers ([facility]) 2024     LIGIA (acute kidney injury) ([facility]) 2024     Failure of joint fusion ([facility]) 2024     Lactic acidosis 2024     Type 2 diabetes mellitus with hyperglycemia, without long-term current use of insulin ([facility]) 2024     Foraminal stenosis of lumbar region 2024     Discitis of lumbosacral region 2024     Hypochromic microcytic anemia 10/10/2023     Acute on chronic bilateral low back pain with sciatica 10/09/2023     Anxiety and depression 10/09/2023     Hypertension 10/09/2023     Benign prostatic hyperplasia with urinary frequency 2023     Scrotal pain 2023     Lower urinary tract symptoms 2023     Status post lumbar spinal fusion 2023     Hyponatremia 2023     Gallstones 2023     Anemia 2023     Urinary retention 2023     Nausea and vomiting      Medical marijuana use      Chronic bilateral low back pain without sciatica 2023     Lumbar radiculopathy      Chronic pain syndrome 2022     Liver disease 08/10/2022     Hypertensive urgency 2022     Bronchitis, mucopurulent recurrent (HCC) 2022     Cirrhosis, alcoholic (HCC) 2022     Diabetic peripheral neuropathy (HCC) 2022     Herniated nucleus pulposus of lumbosacral region 2022     Thyroid cancer (HCC) 2021      Alcoholism in remission ([facility]) 07/30/2021     Benzodiazepine dependence ([facility]) 07/30/2021     Bilateral adhesive capsulitis of shoulders 07/30/2021     DM (diabetes mellitus) ([facility]) 07/30/2021     Hypercholesterolemia 07/30/2021     Lumbar spondylosis 07/30/2021       LOS (days): 0  Geometric Mean LOS (GMLOS) (days): 2.9  Days to GMLOS:2.7     OBJECTIVE:  Risk of Unplanned Readmission Score: 56.23         Current admission status: Inpatient   Preferred Pharmacy:   [facility] [address] [address]  Phone: [phone] Fax: [phone]    Primary Care Provider: Sabra Magaña DO    Primary Insurance: MEDICARE  Secondary Insurance:     DISCHARGE DETAILS:                                          Other Referral/Resources/Interventions Provided:  Interventions: [facility]  Referral Comments: [facility] reserved in [facility]         Treatment Team Recommendation: Home with Home Health Care  Discharge Destination Plan:: Home with Home Health Care

## 2024-10-07 NOTE — CASE MANAGEMENT
Case Management Assessment & Discharge Planning Note    Patient name Juan San  Location /-01 MRN [id number]  : 1955 Date 10/7/2024       Current Admission Date: 10/6/2024  Current Admission Diagnosis:Hypertensive urgency   Patient Active Problem List    Diagnosis Date Noted Date Diagnosed    Elevated troponin I level 10/06/2024     Ambulatory dysfunction 10/06/2024     SIRS (systemic inflammatory response syndrome) ([facility]) 10/06/2024     Hypokalemia 10/06/2024     Metabolic acidosis, increased anion gap 10/06/2024     Prostatitis 2024     Lower extremity edema 2024     Venous stasis ulcers ([facility]) 2024     LIGIA (acute kidney injury) ([facility]) 2024     Failure of joint fusion ([facility]) 2024     Lactic acidosis 2024     Type 2 diabetes mellitus with hyperglycemia, without long-term current use of insulin ([facility]) 2024     Foraminal stenosis of lumbar region 2024     Discitis of lumbosacral region 2024     Hypochromic microcytic anemia 10/10/2023     Acute on chronic bilateral low back pain with sciatica 10/09/2023     Anxiety and depression 10/09/2023     Hypertension 10/09/2023     Benign prostatic hyperplasia with urinary frequency 2023     Scrotal pain 2023     Lower urinary tract symptoms 2023     Status post lumbar spinal fusion 2023     Hyponatremia 2023     Gallstones 2023     Anemia 2023     Urinary retention 2023     Nausea and vomiting      Medical marijuana use      Chronic bilateral low back pain without sciatica 2023     Lumbar radiculopathy      Chronic pain syndrome 2022     Liver disease 08/10/2022     Hypertensive urgency 2022     Bronchitis, mucopurulent recurrent (HCC) 2022     Cirrhosis, alcoholic (HCC) 2022     Diabetic peripheral neuropathy (HCC) 2022     Herniated nucleus pulposus of lumbosacral region 2022     Thyroid cancer (HCC)  08/19/2021     Alcoholism in remission ([facility]) 07/30/2021     Benzodiazepine dependence (HCC) 07/30/2021     Bilateral adhesive capsulitis of shoulders 07/30/2021     DM (diabetes mellitus) (HCC) 07/30/2021     Hypercholesterolemia 07/30/2021     Lumbar spondylosis 07/30/2021       LOS (days): 0  Geometric Mean LOS (GMLOS) (days): 2.9  Days to GMLOS:2.8     OBJECTIVE:    Risk of Unplanned Readmission Score: 57.39         Current admission status: Inpatient       Preferred Pharmacy:   [facility] [address] [address]  Phone: [phone] Fax: [phone]    Primary Care Provider: Sabra Magaña DO    Primary Insurance: MEDICARE  Secondary Insurance:     ASSESSMENT:  Active Health Care Proxies       Jaswinder Tatyana Health Care Representative - Spouse   Primary Phone: [phone] (Mobile)                 Advance Directives  Does patient have a Health Care POA?: No  Was patient offered paperwork?: Yes  Does patient currently have a Health Care decision maker?: Yes, please see Health Care Proxy section  Does patient have Advance Directives?: No  Was patient offered paperwork?: Yes  Primary Contact: Tatyana San, Spouse.         Readmission Root Cause  30 Day Readmission: No    Patient Information  Admitted from:: Home  Mental Status: Alert  During Assessment patient was accompanied by: Not accompanied during assessment  Assessment information provided by:: Spouse, Patient  Primary Caregiver: Self  Caregiver's Name:: Tatyana San  Caregiver's Relationship to Patient:: Family Member  Caregiver's Telephone Number:: [phone] (M)  Support Systems: Self, Spouse/significant other, Children, Private Caregivers  County of Residence: [place]  What city do you live in?: [place]  Home entry access options. Select all that apply.: Stairs  Number of steps to enter home.: 1  Type of Current Residence: [facility]  Living Arrangements: Lives w/  Spouse/significant other  Is patient a ?: Yes  Is patient active with [facility] ( [facility])?: No    Activities of Daily Living Prior to Admission  Functional Status: Assistance  Completes ADLs independently?: Yes  Ambulates independently?: No  Level of ambulatory dependence: Assistance  Does patient use assisted devices?: Yes  Assisted Devices (DME) used: Walker, Straight Cane, Shower Chair, Other (Comment) (Grab bars for the shower.)  Does patient currently own DME?: Yes  What DME does the patient currently own?: Shower Chair, Straight Cane, Walker, Other (Comment) (Grab bars for the shower.)  Does patient have a history of Outpatient Therapy (PT/OT)?: Yes  Does the patient have a history of Short-Term Rehab?: Yes  Does patient have a history of HHC?: Yes  Does patient currently have HHC?: No    Current Home Health Care  Home Health Agency Name:: Other    Patient Information Continued  Income Source: Unemployed  Does patient have prescription coverage?: Yes  Does patient receive dialysis treatments?: No  Does patient have a history of substance abuse?: Yes  Historical substance use preference: Alcohol/ETOH  Is patient currently in treatment for substance abuse?: N/A - sober  Does patient have a history of Mental Health Diagnosis?: Yes  Is patient receiving treatment for mental health?: Yes  Has patient received inpatient treatment related to mental health in the last 2 years?: No         Means of Transportation  Means of Transport to Appts:: Family transport      Social Determinants of Health (SDOH)      Flowsheet Row Most Recent Value   Housing Stability    In the last 12 months, was there a time when you were not able to pay the mortgage or rent on time? N   In the past 12 months, how many times have you moved where you were living? 0   At any time in the past 12 months, were you homeless or living in a shelter (including now)? N   Transportation Needs    In the past 12 months, has lack of transportation  kept you from medical appointments or from getting medications? no   In the past 12 months, has lack of transportation kept you from meetings, work, or from getting things needed for daily living? No   Food Insecurity    Within the past 12 months, you worried that your food would run out before you got the money to buy more. Never true   Utilities    In the past 12 months has the electric, gas, oil, or water company threatened to shut off services in your home? No            DISCHARGE DETAILS:    Discharge planning discussed with:: Pt and spouse  Freedom of Choice: Yes     CM contacted family/caregiver?: Yes  Were Treatment Team discharge recommendations reviewed with patient/caregiver?: Yes  Did patient/caregiver verbalize understanding of patient care needs?: Yes  Were patient/caregiver advised of the risks associated with not following Treatment Team discharge recommendations?: Yes    Contacts  Patient Contacts: Tatyana Jaswinder, wife  Relationship to Patient:: Family  Contact Method: Phone  Phone Number: [phone]  Reason/Outcome: Continuity of Care, Emergency Contact, Discharge Planning    Requested Home Health Care         Is the patient interested in HHC at discharge?: Yes  Home Health Discipline requested:: Occupational Therapy, Physical Therapy  Home Health Agency Name:: Other  HHA External Referral Reason (only applicable if external HHA name selected): Patient has established relationship with provider  Home Health Follow-Up Provider:: PCP  Home Health Services Needed:: Strengthening/Theraputic Exercises to Improve Function, Evaluate Functional Status and Safety, Gait/ADL Training  Homebound Criteria Met:: Uses an Assist Device (i.e. cane, walker, etc), Requires the Assistance of Another Person for Safe Ambulation or to Leave the Home  Supporting Clincal Findings:: Limited Endurance, Fatigues Easliy in Short Distances    DME Referral Provided  Referral made for DME?: No    Other  Referral/Resources/Interventions Provided:  Interventions: HHC, HHA  Referral Comments: CM also sent list of non-skilled providers to pt's spouse's email.         Treatment Team Recommendation: Home with Home Health Care  Discharge Destination Plan:: Home with Home Health Care                                         Additional Comments: Cm met with pt at bedside and spoke with spouse over the phone to discern discharge needs. Pt lives with spouse in a 1sh, 1STE, 1st fl setup. Pt reports being independent with walking and ADLs PTA. Does not drive. Uses and owns a walker, cane, shower chair, and grab bars for the shower. Hx of STR, HHC through [facility], and [facility]. [place], but not service connected. No SDOH, mental health, or D&A concerns. Pt's spouse stated that pt had a Home Health aid through [facility], but the caregiver they were working with stopped working at the agency. They have been trying to find a replacement, but with little success. Pt has been without the home health aide for about a month, and has had two falls since then, per Spouse. CM emailed Tatyana the list of non-skilled providers in the area. Per spouse, they receive HHA through the waiver program. CM also explained that pt was recommended for HHC. Pt and Tatyana agreeable. Referrals sent via [employer]. Awaiting Accepting.

## 2024-10-07 NOTE — PLAN OF CARE
Problem: Potential for Falls  Goal: Patient will remain free of falls  Description: INTERVENTIONS:  - Educate patient/family on patient safety including physical limitations  - Instruct patient to call for assistance with activity   - Consult OT/PT to assist with strengthening/mobility   - Keep Call bell within reach  - Keep bed low and locked with side rails adjusted as appropriate  - Keep care items and personal belongings within reach  - Initiate and maintain comfort rounds  - Make Fall Risk Sign visible to staff  - Offer Toileting every 2 Hours, in advance of need  - Initiate/Maintain bed/chair alarm  - Obtain necessary fall risk management equipment:   - Apply yellow socks and bracelet for high fall risk patients  - Consider moving patient to room near nurses station  Outcome: Progressing     Problem: PAIN - ADULT  Goal: Verbalizes/displays adequate comfort level or baseline comfort level  Description: Interventions:  - Encourage patient to monitor pain and request assistance  - Assess pain using appropriate pain scale  - Administer analgesics based on type and severity of pain and evaluate response  - Implement non-pharmacological measures as appropriate and evaluate response  - Consider cultural and social influences on pain and pain management  - Notify physician/advanced practitioner if interventions unsuccessful or patient reports new pain  Outcome: Progressing     Problem: INFECTION - ADULT  Goal: Absence or prevention of progression during hospitalization  Description: INTERVENTIONS:  - Assess and monitor for signs and symptoms of infection  - Monitor lab/diagnostic results  - Monitor all insertion sites, i.e. indwelling lines, tubes, and drains  - Monitor endotracheal if appropriate and nasal secretions for changes in amount and color  - [place] appropriate cooling/warming therapies per order  - Administer medications as ordered  - Instruct and encourage patient and family to use good hand hygiene  technique  - Identify and instruct in appropriate isolation precautions for identified infection/condition  Outcome: Progressing  Goal: Absence of fever/infection during neutropenic period  Description: INTERVENTIONS:  - Monitor WBC    Outcome: Progressing     Problem: SAFETY ADULT  Goal: Patient will remain free of falls  Description: INTERVENTIONS:  - Educate patient/family on patient safety including physical limitations  - Instruct patient to call for assistance with activity   - Consult OT/PT to assist with strengthening/mobility   - Keep Call bell within reach  - Keep bed low and locked with side rails adjusted as appropriate  - Keep care items and personal belongings within reach  - Initiate and maintain comfort rounds  - Make Fall Risk Sign visible to staff  - Offer Toileting every 2 Hours, in advance of need  - Initiate/Maintain bed/chair alarm  - Obtain necessary fall risk management equipment:   - Apply yellow socks and bracelet for high fall risk patients  - Consider moving patient to room near nurses station  Outcome: Progressing  Goal: Maintain or return to baseline ADL function  Description: INTERVENTIONS:  -  Assess patient's ability to carry out ADLs; assess patient's baseline for ADL function and identify physical deficits which impact ability to perform ADLs (bathing, care of mouth/teeth, toileting, grooming, dressing, etc.)  - Assess/evaluate cause of self-care deficits   - Assess range of motion  - Assess patient's mobility; develop plan if impaired  - Assess patient's need for assistive devices and provide as appropriate  - Encourage maximum independence but intervene and supervise when necessary  - Involve family in performance of ADLs  - Assess for home care needs following discharge   - Consider OT consult to assist with ADL evaluation and planning for discharge  - Provide patient education as appropriate  Outcome: Progressing  Goal: Maintains/Returns to pre admission functional  level  Description: INTERVENTIONS:  - Perform AM-PAC 6 Click Basic Mobility/ Daily Activity assessment daily.  - Set and communicate daily mobility goal to care team and patient/family/caregiver.   - Collaborate with rehabilitation services on mobility goals if consulted  - Perform Range of Motion 3 times a day.  - Reposition patient every 3 hours.  - Dangle patient 3 times a day  - Stand patient 3 times a day  - Ambulate patient 3 times a day  - Out of bed to chair 3 times a day   - Out of bed for meals 3 times a day  - Out of bed for toileting  - Record patient progress and toleration of activity level   Outcome: Progressing     Problem: DISCHARGE PLANNING  Goal: Discharge to home or other facility with appropriate resources  Description: INTERVENTIONS:  - Identify barriers to discharge w/patient and caregiver  - Arrange for needed discharge resources and transportation as appropriate  - Identify discharge learning needs (meds, wound care, etc.)  - Arrange for interpretive services to assist at discharge as needed  - Refer to Case Management Department for coordinating discharge planning if the patient needs post-hospital services based on physician/advanced practitioner order or complex needs related to functional status, cognitive ability, or social support system  Outcome: Progressing     Problem: Knowledge Deficit  Goal: Patient/family/caregiver demonstrates understanding of disease process, treatment plan, medications, and discharge instructions  Description: Complete learning assessment and assess knowledge base.  Interventions:  - Provide teaching at level of understanding  - Provide teaching via preferred learning methods  Outcome: Progressing     Problem: SKIN/TISSUE INTEGRITY - ADULT  Goal: Skin Integrity remains intact(Skin Breakdown Prevention)  Description: Assess:  -Perform [place] assessment every shift  -Clean and moisturize skin every shift and PRN  -Inspect skin when repositioning, toileting, and  assisting with ADLS  -Assess under medical devices  -Assess extremities for adequate circulation and sensation     Bed Management:  -Have minimal linens on bed & keep smooth, unwrinkled  -Change linens as needed when moist or perspiring  -Avoid sitting or lying in one position for more than 2 hours while in bed  -Keep HOB at 30 degrees     Toileting:  -Offer bedside commode  -Assess for incontinence   -Use incontinent care products after each incontinent episode     Activity:  -Mobilize patient 2 times a day  -Encourage activity and walks on unit  -Encourage or provide ROM exercises   -Turn and reposition patient every 2 Hours  -Use appropriate equipment to lift or move patient in bed  -Instruct/ Assist with weight shifting every 2 when out of bed in chair  -Consider limitation of chair time 2 hour intervals    Skin Care:  -Avoid use of baby powder, tape, friction and shearing, hot water or constrictive clothing  -Relieve pressure over bony prominences   -Do not massage red bony areas    Next Steps:  -Teach patient strategies to minimize risks   -Consider consults to  interdisciplinary teams   Outcome: Progressing  Goal: Incision(s), wounds(s) or drain site(s) healing without S/S of infection  Description: INTERVENTIONS  - Assess and document dressing, incision, wound bed, drain sites and surrounding tissue  - Provide patient and family education  - Perform skin care/dressing changes every shift and PRN  Outcome: Progressing     Problem: MOBILITY - ADULT  Goal: Maintain or return to baseline ADL function  Description: INTERVENTIONS:  -  Assess patient's ability to carry out ADLs; assess patient's baseline for ADL function and identify physical deficits which impact ability to perform ADLs (bathing, care of mouth/teeth, toileting, grooming, dressing, etc.)  - Assess/evaluate cause of self-care deficits   - Assess range of motion  - Assess patient's mobility; develop plan if impaired  - Assess patient's need for  assistive devices and provide as appropriate  - Encourage maximum independence but intervene and supervise when necessary  - Involve family in performance of ADLs  - Assess for home care needs following discharge   - Consider OT consult to assist with ADL evaluation and planning for discharge  - Provide patient education as appropriate  Outcome: Progressing  Goal: Maintains/Returns to pre admission functional level  Description: INTERVENTIONS:  - Perform AM-PAC 6 Click Basic Mobility/ Daily Activity assessment daily.  - Set and communicate daily mobility goal to care team and patient/family/caregiver.   - Collaborate with rehabilitation services on mobility goals if consulted  - Perform Range of Motion 3 times a day.  - Reposition patient every 3 hours.  - Dangle patient 3 times a day  - Stand patient 3 times a day  - Ambulate patient 3 times a day  - Out of bed to chair 3 times a day   - Out of bed for meals 3 times a day  - Out of bed for toileting  - Record patient progress and toleration of activity level   Outcome: Progressing

## 2024-10-07 NOTE — PLAN OF CARE
Problem: PHYSICAL THERAPY ADULT  Goal: Performs mobility at highest level of function for planned discharge setting.  See evaluation for individualized goals.  Description: Treatment/Interventions: Functional transfer training, LE strengthening/ROM, Elevations, Therapeutic exercise, Endurance training, Patient/family training, Equipment eval/education, Bed mobility, Gait training  Equipment Recommended: Walker       See flowsheet documentation for full assessment, interventions and recommendations.  10/7/2024 1233 by Lea Johnson, PT  Note:    Problem List: Decreased strength, Decreased endurance, Decreased mobility, Impaired balance, Pain  Assessment: Juan San is a 69 y.o. Male who [facility] from home w/ c/o fall w/ worsening acute on chronic LBP and diagnosis of HTN urgency. Orders for PT eval and treat received. Pt presents w/ comorbidities of CPS, BPH, DMII, chronic low back pain w/ sciatica w/ recent hx of lumbar spinal fusion, anxiety and depression. At baseline, pt mobilizes modified I w/ RW, and reports + falls in the last 6 months. Upon evaluation, pt presents w/ the following deficits: weakness, altered sensation, impaired balance, decreased endurance, and pain limiting functional mobility. Upon eval, pt requires modified I for bed mobility, modified I for transfers, and supervision for gait. Based on this PT evaluation today, patient's discharge recommendation is for Level III - Low Rehab Resource Intensity. During this admission, pt would benefit from continued skilled inpatient PT in the acute care setting in order to address the abovementioned deficits to maximize function and mobility before DC from acute care.        Rehab Resource Intensity Level, PT: III (Minimum Resource Intensity)    See flowsheet documentation for full assessment.

## 2024-10-07 NOTE — WOUND OSTOMY CARE
Consult Note - Wound   Juan San 69 y.o. male MRN: [id number]  Unit/Bed#: -01 Encounter: [id number]      History and Present Illness:  69 year old male presents to emergency room with worsening low back and bilateral hip pain after a mechanical fall at home.  Significant medical history includes Thyroid Cancer, Hypokalemia, DM II and Benign Prostatic Hyperplasia.    Assessment Findings:   Patient lying in bed on standard mattress resting.  Alert, oriented, pleasant and agreeable to assessment.  On a clear liquid diet.  Patient reports high level of pain in lower back, medication patch intact on lower left back.  Pain levels reported to primary nurse for medication request.  Heels pink and blanchable.  There are healed wounds /scar tissue on the right tibia that patient reports looked like the new ones on the left. Venous stasis wound etiology possibly, as there is history of that. Continent of bowel and bladder.  Normally able to ambulate independently, but recently pain has made this very difficult.  Able to turn in bed independently.  Sacrum is intact and blanchable.      Anterior Left Foot-Fragile pink partial thickness wound bed with small amount of serosanguineous drainage.  Emely wound area is pink erythema.  Possibly venous stasis in etiology.  Left Anterior/Inner D2 Toe-Pink partial thickness wound bed with scant amount of serosanguineous drainage. Emely wound is dry pink erythema.    Traumatic Lower Left Back-Partial thickness pink with tan hue wound.  Emely wound is pink erythema. Etiology unknown.  No drainage.      See flowsheet for wound details.    Wound Care Plan:   1-Silicone Cream/Hydraguard lotion to sacrum, buttocks and bilateral heels twice daily and as needed.  2-Elevate heels off of bed/chair surface to offload pressure.  3-Offloading air cushion in chair when out of bed.  4-Apply moisturizing skin cream to body daily and as needed.  5-Turn/reposition every 2 hours while in bed and  weight shift frequently while in chair for pressure re-distribution on skin.   6-Left Foot Anterior, Second Toe Inferior and D2 Anterior toe-Cleanse with saline, pat dry.  Apply silver alginate to the open areas, cover with ABD and wrap with gauze.  Change daily and as needed.  7-Cleanse with soap and water, pat dry.  Apply silicone edged foam to wound bed.  Change every three days or as needed.    Wound care team to follow.  Plan of care reviewed with primary RN.    Wound 10/07/24 Foot Anterior;Left (Active)   Wound Image   10/07/24 1248   Wound Description Drainage;Fragile;Pink 10/07/24 1248   Emely-wound Assessment Pink;Erythema 10/07/24 1248   Wound Length (cm) 5 cm 10/07/24 1248   Wound Width (cm) 3.1 cm 10/07/24 1248   Wound Depth (cm) 0.1 cm 10/07/24 1248   Wound Surface Area (cm^2) 15.5 cm^2 10/07/24 1248   Wound Volume (cm^3) 1.55 cm^3 10/07/24 1248   Calculated Wound Volume (cm^3) 1.55 cm^3 10/07/24 1248   Drainage Amount Small 10/07/24 1248   Drainage Description Serosanguineous 10/07/24 1248   Non-staged Wound Description Partial thickness 10/07/24 1248   Treatments Cleansed;Irrigation with NSS;Site care 10/07/24 1248   Dressing Calcium Alginate with Silver;ABD;Gauze 10/07/24 1248   Wound packed? No 10/07/24 1248   Dressing Changed New 10/07/24 1248   Patient Tolerance Tolerated well 10/07/24 1248   Dressing Status Clean;Dry;Intact 10/07/24 1248       Wound 10/07/24 Venous Ulcer Toe D2, second Anterior;Left;Inner (Active)   Wound Image   10/07/24 1251   Wound Description [place] 10/07/24 1251   Emely-wound Assessment Dry;Pink;Erythema 10/07/24 1251   Wound Length (cm) 1 cm 10/07/24 1251   Wound Width (cm) 0.5 cm 10/07/24 1251   Wound Depth (cm) 0.1 cm 10/07/24 1251   Wound Surface Area (cm^2) 0.5 cm^2 10/07/24 1251   Wound Volume (cm^3) 0.05 cm^3 10/07/24 1251   Calculated Wound Volume (cm^3) 0.05 cm^3 10/07/24 1251   Drainage Amount Scant 10/07/24 1251   Drainage Description Serosanguineous 10/07/24 1251    Non-staged Wound Description Partial thickness 10/07/24 1251   Treatments Cleansed;Irrigation with NSS;Site care 10/07/24 1251   Dressing ABD;Calcium Alginate with Silver;Gauze 10/07/24 1251   Wound packed? No 10/07/24 1251   Dressing Changed New 10/07/24 1251   Patient Tolerance Tolerated well 10/07/24 1251   Dressing Status Clean;Dry;Intact 10/07/24 1251       Wound 10/07/24 Traumatic Other (Comment) Back Left;Lower (Active)   Wound Image   10/07/24 1251   Wound Description Pink;[place] 10/07/24 1251   Emely-wound Assessment Erythema;[place] 10/07/24 1251   Wound Length (cm) 2 cm 10/07/24 1251   Wound Width (cm) 2.7 cm 10/07/24 1251   Wound Depth (cm) 1 cm 10/07/24 1251   Wound Surface Area (cm^2) 5.4 cm^2 10/07/24 1251   Wound Volume (cm^3) 5.4 cm^3 10/07/24 1251   Calculated Wound Volume (cm^3) 5.4 cm^3 10/07/24 1251   Drainage Amount None 10/07/24 1251   Non-staged Wound Description Partial thickness 10/07/24 1251   Dressing Foam, Silicon (eg. Allevyn, etc) 10/07/24 1251   Wound packed? No 10/07/24 1251   Dressing Changed New 10/07/24 1251   Patient Tolerance Tolerated well 10/07/24 1251   Dressing Status Clean;Dry;Intact 10/07/24 1251         Isha Carlisle, RN, BSN

## 2024-10-07 NOTE — ASSESSMENT & PLAN NOTE
Presented to the emergency department with bilateral hip pain following mechanical fall  CT recon lumbar spine reveals extensive disc disease but unchanged from prior study in September   Continue pain control, difficulty achieving adequate control today, regimen adjusted  Pt has back surgery planned in near future per pt's wife  Fall precautions  PT/OT evaluation-level III

## 2024-10-07 NOTE — DISCHARGE INSTR - OTHER ORDERS
Wound Care Plan:   1-Silicone Cream/Hydraguard lotion to sacrum, buttocks and bilateral heels twice daily and as needed.  2-Elevate heels off of bed/chair surface to offload pressure.  3-Offloading air cushion in chair when out of bed.  4-Apply moisturizing skin cream to body daily and as needed.  5-Turn/reposition every 2 hours while in bed and weight shift frequently while in chair for pressure re-distribution on skin.   6-Left Foot Anterior, Second Toe Inferior and D2 Anterior toe-Cleanse with saline, pat dry.  Apply silver alginate to the open areas, cover with ABD and wrap with gauze.  Change daily and as needed.  7-Cleanse with soap and water, pat dry.  Apply silicone edged foam to wound bed.  Change every three days or as needed.

## 2024-10-07 NOTE — ASSESSMENT & PLAN NOTE
Tachycardia resolved and leukocytosis nearly resolved without abx  Patient met SIRS criteria with tachycardia, leukocytosis in setting of vomiting  CT abdomen/pelvis without acute findings with exception of hypodensities on prostate  UA bland  No e/o prostatitis  Trend WBC and fever curve  Monitor off antibiotics, low suspicion for infectious process

## 2024-10-07 NOTE — CASE MANAGEMENT
Case Management Discharge Planning Note    Patient name Juan San  Location /-01 MRN [id number]  : 1955 Date 10/7/2024       Current Admission Date: 10/6/2024  Current Admission Diagnosis:Hypertensive urgency   Patient Active Problem List    Diagnosis Date Noted Date Diagnosed    Elevated troponin I level 10/06/2024     Ambulatory dysfunction 10/06/2024     SIRS (systemic inflammatory response syndrome) ([facility]) 10/06/2024     Hypokalemia 10/06/2024     Metabolic acidosis, increased anion gap 10/06/2024     Prostatitis 2024     Lower extremity edema 2024     Venous stasis ulcers ([facility]) 2024     LIGIA (acute kidney injury) ([facility]) 2024     Failure of joint fusion ([facility]) 2024     Lactic acidosis 2024     Type 2 diabetes mellitus with hyperglycemia, without long-term current use of insulin ([facility]) 2024     Foraminal stenosis of lumbar region 2024     Discitis of lumbosacral region 2024     Hypochromic microcytic anemia 10/10/2023     Acute on chronic bilateral low back pain with sciatica 10/09/2023     Anxiety and depression 10/09/2023     Hypertension 10/09/2023     Benign prostatic hyperplasia with urinary frequency 2023     Scrotal pain 2023     Lower urinary tract symptoms 2023     Status post lumbar spinal fusion 2023     Hyponatremia 2023     Gallstones 2023     Anemia 2023     Urinary retention 2023     Nausea and vomiting      Medical marijuana use      Chronic bilateral low back pain without sciatica 2023     Lumbar radiculopathy      Chronic pain syndrome 2022     Liver disease 08/10/2022     Hypertensive urgency 2022     Bronchitis, mucopurulent recurrent (HCC) 2022     Cirrhosis, alcoholic (HCC) 2022     Diabetic peripheral neuropathy (HCC) 2022     Herniated nucleus pulposus of lumbosacral region 2022     Thyroid cancer (HCC) 2021      Alcoholism in remission ([facility]) 07/30/2021     Benzodiazepine dependence ([facility]) 07/30/2021     Bilateral adhesive capsulitis of shoulders 07/30/2021     DM (diabetes mellitus) (HCC) 07/30/2021     Hypercholesterolemia 07/30/2021     Lumbar spondylosis 07/30/2021       LOS (days): 0  Geometric Mean LOS (GMLOS) (days): 2.9  Days to GMLOS:2.8     OBJECTIVE:  Risk of Unplanned Readmission Score: 57.39         Current admission status: Inpatient   Preferred Pharmacy:   [facility] [address] [address]  Phone: [phone] Fax: [phone]    Primary Care Provider: Sabra Magaña DO    Primary Insurance: MEDICARE  Secondary Insurance:     DISCHARGE DETAILS:                                Requested Home Health Care         Is the patient interested in HHC at discharge?: Yes  Home Health Discipline requested:: Occupational Therapy, Physical Therapy  Home Health Agency Name:: Other  HHA External Referral Reason (only applicable if external HHA name selected): Patient has established relationship with provider  Home Health Follow-Up Provider:: PCP  Home Health Services Needed:: Strengthening/Theraputic Exercises to Improve Function, Evaluate Functional Status and Safety, Gait/ADL Training  Homebound Criteria Met:: Uses an Assist Device (i.e. cane, walker, etc), Requires the Assistance of Another Person for Safe Ambulation or to Leave the Home  Supporting Clincal Findings:: Limited Endurance, Fatigues Easliy in Short Distances    DME Referral Provided  Referral made for DME?: No    Other Referral/Resources/Interventions Provided:  Interventions: HHC  Referral Comments: HHC referrals made in [place]. Confirmed with the spouse that pt had [facility] in the past.

## 2024-10-07 NOTE — ASSESSMENT & PLAN NOTE
Lab Results   Component Value Date    HGBA1C 5.9 (H) 10/06/2024       Recent Labs     10/06/24  1629 10/06/24  2055 10/07/24  0721 10/07/24  1138   POCGLU 131 149* 123 148*       Blood Sugar Average: Last 72 hrs:  (P) 137.75Hold metformin, Jardiance  SSI plus Accu-Chek  Updated A1c 5.9

## 2024-10-07 NOTE — ASSESSMENT & PLAN NOTE
Reports recurrent episodes intermittently at home, he feels related to excessive post nasal drip but he also admits to frequent use ( medical MJ for back pain)  Suspect this may be cyclic vomiting syndrome  CTH negative but does show mucus retention cysts in maxillary sinuses, reports he has been on Zyrtec with little relief  CT abdomen/pelvis unremarkable  Continue supportive care with IVF, antiemetics  Advancing to full liquids this evening   May benefit from ENT referral on discharge for treatment/management of post nasal drip and sinus cysts  Discussed with pt and wife that if these episodes continue to recur, may consider stopping MJ use as it could be triggering cyclic vomiting

## 2024-10-07 NOTE — UTILIZATION REVIEW
Initial Clinical Review    Admission: Date/Time/Statement: 10/6/24 1239 observation and CHANGED 10/7/24 1140 INPATIENT RE: PATIENT NEEDS > 2 MIDNIGHT STAY DUE TO GAIT DYSFUNCTION AND PAIN CONTROL  Admission Orders (From admission, onward)       Ordered        10/07/24 1140  INPATIENT ADMISSION  Once            10/06/24 1239  Place in Observation  Once                          Orders Placed This Encounter   Procedures    INPATIENT ADMISSION     Standing Status:   Standing     Number of Occurrences:   1     Order Specific Question:   Level of Care     Answer:   Med Surg [16]     Order Specific Question:   Estimated length of stay     Answer:   More than 2 Midnights     Order Specific Question:   Certification     Answer:   I certify that inpatient services are medically necessary for this patient for a duration of greater than two midnights. See H&P and MD Progress Notes for additional information about the patient's course of treatment.     ED Arrival Information       Expected   -    Arrival   10/6/2024 08:07    Acuity   Emergent              Means of arrival   Ambulance    Escorted by   [facility]    Service   Hospitalist    Admission type   Emergency              Arrival complaint   Vomiting             Chief Complaint   Patient presents with    Hip Pain     Patient presents to the ER with bilateral hip pain post falling in his home ~0130 this morning.       Initial Presentation: 69 y.o. male  to ED via EMS from home.    Admitted to observation AND CONVERTED TO INPATIENT with Dx: Hypertensive Urgency/Nausea and vomiting/elevated troponin.  Presented to ED with bilateral hip and back pain and on day of arrival fall on left side and wife assisted up and unable to walk. Nausea and vomiting starting yesterday.  PMHx: type 2 diabetes, history of thyroid cancer, BPH, chronic pain syndrome. On exam: bony tenderness L5.  Sed rate 62.  CRP 31.7.  wbc 12.  Hs tnl 135> 129/-6. K 3.4.  glucose 208.    ED treatment:   given IVF bolus of 1 liter, Zofran,  3 doses of IV analgesia and Labetalol IV.    Plan includes  to monitor BP, continue home antihypertensives.   Continue IVF and antiemetics, trial of clears.  Telemetry.  Trend troponin.  Pain control.  Hold metformin and Jardiance.  Check A1c and start SSI.  Check lactic acid, repeat BMP, VBG, beta hydroxybutyrate given hyperglycemia PT/OT.  Monitor off antibiotics.       Anticipated Length of Stay/Certification Statement: Patient will be admitted on an observation basis with an anticipated length of stay of less than 2 midnights secondary to hypertensive urgency, nausea/vomiting.     Date: 10/7/24   Day 2:  Complaints of ongoing low back pain.   Pain limits mobility.  With PT has decreased foot clearance, antalgic gait, knees flexed.  Continue pain control.  PT/OT.      Patient has crossed 3 midnights and requires ongoing care    10/8/2024 .  Patient presents with  ability to tolerate diet.  Feels pain not controlled, will be having spinal surgery with [facility].  + grief over close and recent death of brothers.   On exam alert and oriented.   Abnormal labs or imaging:  glucose 206.   Mg 1.8.   Diagnosis/Plan    Hypertensive Urgency/Nausea and vomiting, feels related to post nasal drip but also admits to frequent medical marijuana use for back pain/elevated troponin related to hpt/Chronic pain/Type 2 DM/Ambulatory dysfunction/SIRS. Replete electrolytes.  Pain control.      ED Treatment-Medication Administration from 10/06/2024 0807 to 10/06/2024 1322         Date/Time Order Dose Route Action     10/06/2024 0836 sodium chloride 0.9 % bolus 1,000 mL 1,000 mL Intravenous New Bag     10/06/2024 0901 ondansetron (ZOFRAN) injection 4 mg 4 mg Intravenous Given     10/06/2024 0901 ketorolac (TORADOL) injection 15 mg 15 mg Intravenous Given     10/06/2024 1018 HYDROmorphone (DILAUDID) injection 0.5 mg 0.5 mg Intravenous Given     10/06/2024 1018 aluminum-magnesium hydroxide-simethicone  (MAALOX) oral suspension 30 mL 30 mL Oral Given     10/06/2024 1234 ondansetron (ZOFRAN) injection 4 mg 4 mg Intravenous Given     10/06/2024 1234 HYDROmorphone (DILAUDID) injection 0.5 mg 0.5 mg Intravenous Given     10/06/2024 1249 labetalol (NORMODYNE) injection 10 mg 10 mg Intravenous Given            Scheduled Medications:  aspirin, 81 mg, Oral, Daily  clonazePAM, 1 mg, Oral, BID  enoxaparin, 40 mg, Subcutaneous, Daily  escitalopram, 20 mg, Oral, Daily  finasteride, 5 mg, Oral, Daily  folic acid, 2,000 mcg, Oral, Daily  gabapentin, 100 mg, Oral, Daily  insulin lispro, 1-6 Units, Subcutaneous, TID AC  insulin lispro, 1-6 Units, Subcutaneous, HS  lidocaine, 1 patch, Topical, Daily  melatonin, 6 mg, Oral, HS  metoprolol succinate, 50 mg, Oral, BID  mirtazapine, 45 mg, Oral, HS  NIFEdipine, 120 mg, Oral, Daily  pantoprazole, 40 mg, Intravenous, Q12H CHRISTIE  pravastatin, 20 mg, Oral, Daily With Dinner  senna, 8.6 mg, Oral, BID  tamsulosin, 0.4 mg, Oral, Daily With Dinner    magnesium sulfate 2 g/50 mL IVPB (premix) 2 g  Dose: 2 g  Freq: Once Route: IV  Last Dose: 2 g (10/06/24 1549)  Start: 10/06/24 1445 End: 10/06/24 1749  potassium chloride (Klor-Con M20) CR tablet 40 mEq  Dose: 40 mEq  Freq: Once Route: PO  Start: 10/06/24 1430 End: 10/06/24 1411    Continuous IV Infusions:  multi-electrolyte (PLASMALYTE-A/ISOLYTE-S PH 7.4) IV solution  Rate: 75 mL/hr Dose: 75 mL/hr  Freq: Continuous Route: IV  Last Dose: 75 mL/hr (10/06/24 1416)  Start: 10/06/24 1345 End: 10/07/24 0015     PRN Meds:  acetaminophen, 650 mg, Oral, Q6H PRN x 1 10/7  methocarbamol, 750 mg, Oral, Q6H PRN x 1 10/7  ondansetron, 4 mg, Intravenous, Q6H PRN  oxyCODONE, 5 mg, Oral, Q6H PRN  traMADol, 50 mg, Oral, Q6H PRN  x 1 10/7      ED Triage Vitals [10/06/24 0812]   Temperature Pulse Respirations Blood Pressure SpO2 Pain Score   98.4 °F (36.9 °C) (!) 115 18 (!) 222/98 99 % 10 - Worst Possible Pain     Weight (last 2 days)       Date/Time Weight     10/06/24 13:28:33 81.5 (179.68)    10/06/24 0812 80.3 (177)            Vital Signs (last 3 days)       Date/Time Temp Pulse Resp BP MAP (mmHg) SpO2 O2 Device Patient Position - Orthostatic VS Pain    10/08/24 1434 -- -- -- -- -- -- -- -- 10 - Worst Possible Pain    10/08/24 1208 -- -- -- -- -- -- -- -- 10 - Worst Possible Pain    10/08/24 0947 -- -- -- -- -- -- -- -- 8    10/08/24 07:35:53 98.5 °F (36.9 °C) -- 20 152/83 106 -- None (Room air) -- No Pain    10/07/24 23:10:27 99.2 °F (37.3 °C) -- 17 151/83 106 -- -- -- --    10/07/24 2300 -- -- -- -- -- -- None (Room air) -- 8    10/07/24 2208 -- -- -- -- -- -- -- -- 9    10/07/24 2043 -- -- -- -- -- -- -- -- 9    10/07/24 1809 -- -- -- -- -- -- -- -- 10 - Worst Possible Pain    10/07/24 15:52:55 99.4 °F (37.4 °C) -- 15 132/73 93 -- -- -- --    10/07/24 1314 -- -- -- -- -- -- -- -- 10 - Worst Possible Pain    10/07/24 1035 -- -- -- -- -- -- -- -- 10 - Worst Possible Pain    10/07/24 1020 -- -- -- -- -- -- -- -- 10 - Worst Possible Pain    10/07/24 1007 -- -- -- -- -- -- -- -- 10 - Worst Possible Pain    10/07/24 0822 -- -- -- -- -- -- -- -- 10 - Worst Possible Pain    10/07/24 0746 -- -- -- -- -- -- None (Room air) -- 8    10/07/24 03:52:16 98.8 °F (37.1 °C) 72 20 131/73 92 99 % None (Room air) -- --    10/07/24 0242 -- -- -- -- -- -- -- -- 8    10/07/24 0223 -- -- -- -- -- -- -- -- 10 - Worst Possible Pain    10/07/24 0043 -- -- -- -- -- -- -- -- 10 - Worst Possible Pain    10/06/24 22:14:55 98.7 °F (37.1 °C) -- -- 126/62 83 -- -- -- 7    10/06/24 21:14:14 -- 76 -- 164/83 110 98 % -- -- --    10/06/24 1955 -- -- -- -- -- -- -- -- 10 - Worst Possible Pain    10/06/24 1700 -- -- -- -- -- 95 % None (Room air) -- --    10/06/24 14:51:58 99.7 °F (37.6 °C) 82 -- 161/80 107 94 % -- -- --    10/06/24 1417 -- -- -- -- -- -- -- -- 10 - Worst Possible Pain    10/06/24 1343 -- -- -- -- -- -- -- -- 10 - Worst Possible Pain    10/06/24 13:28:33 98.6 °F (37 °C) 95 20 187/89 122 98  % -- -- --    10/06/24 1234 -- -- -- -- -- -- -- -- 8    10/06/24 1200 -- 95 22 200/86 124 99 % -- -- 9    10/06/24 1123 -- 95 20 195/88 127 100 % None (Room air) Lying --    10/06/24 1122 -- -- -- -- -- -- -- -- 9    10/06/24 1018 -- -- -- -- -- -- -- -- 9    10/06/24 0943 -- -- -- -- -- -- -- -- 9    10/06/24 0930 -- 92 19 201/86 124 100 % None (Room air) Lying 9    10/06/24 0901 -- -- -- -- -- -- -- -- 10 - Worst Possible Pain    10/06/24 0812 98.4 °F (36.9 °C) 115 18 222/98 -- 99 % None (Room air) Lying 10 - Worst Possible Pain            Pertinent Labs/Diagnostic Test Results:   Radiology:  XR chest portable   Final Interpretation by Raya Hernandez MD (10/07 1636)      No acute cardiopulmonary disease.            Workstation performed: [id number]         CT head without contrast   Final Interpretation by Deven Thomson MD (10/06 3262)      No acute intracranial abnormality.                  Workstation performed: [id number]         CT abdomen pelvis with contrast   Final Interpretation by Alfredo Dalal MD (10/06 2710)      1. No acute findings in the abdomen or pelvis. No acute fracture.      2. Stable appearance of the lumbar spine compared to CT 9/20/2024.      3. Prostatomegaly with hypodense foci bilaterally. Please refer to previous MRI of the pelvis from 8/17/2024. Correlate for any  suspicion of prostatitis.         Resident: KALEY IBARRA I, the attending radiologist, have reviewed the images and agree with the final report above.      Workstation performed: [id number]         CT recon only lumbar spine   Final Interpretation by Alfredo Dalal MD (10/06 9017)      No interval change from 9/20/2024; no acute fracture. Please see above for additional details.         Workstation performed: [id number]         XR hips bilateral with ap pelvis 2 vw   ED Interpretation by RUIZ Wang (10/06 0955)   No acute fracture identified by me.      Final Interpretation by Deven Thomson MD (10/07 0744)       No acute osseous abnormality.   Postoperative change after T12-S1 posterior fixation with lucency surrounding the sacral transpedicular screws concerning for loosening as before.   Degenerative changes as described.         Computerized Assisted Algorithm (CAA) may have been used to analyze all applicable images.            Workstation performed: [id number]           Cardiology:  ECG 12 lead   Final Result by Rodney Kauffman MD (10/07 0736)   Sinus tachycardia   Otherwise normal ECG   When compared with ECG of 20-SEP-2024 12:20,   Vent. rate has increased BY  36 BPM   Confirmed by Rodney Kauffman ([id number]) on 10/7/2024 7:36:10 AM        Date/Time: 10/6/2024 8:55 AM  Indications / Diagnosis:  Nausea and vomiting  ECG reviewed by the ED Provider: yes    Patient location:  ED  Previous ECG:     Previous ECG:  Compared to current    Comparison ECG info:  September 20, 2024    Similarity:  Changes noted (Sinus tachycardia has replaced sinus rhythm)    Comparison to cardiac monitor: Yes    Interpretation:     Interpretation: non-specific    Rate:     ECG rate:  109    ECG rate assessment: tachycardic    Rhythm:     Rhythm: sinus tachycardia    Ectopy:     Ectopy: none    QRS:     QRS axis:  Normal    QRS intervals:  Normal  Conduction:     Conduction: normal    ST segments:     ST segments:  Normal  T waves:     T waves: normal    Other findings:     Other findings: prolonged qTc interval    Comments:      Sinus tachycardia, normal axis, normal intervals, no acute ischemic changes    Date/Time: 10/6/2024 12:15 PM  Indications / Diagnosis:  Elevated troponin  ECG reviewed by  the ED Provider: yes    Patient location:  ED  Previous ECG:     Previous ECG:  Compared to current    Comparison ECG info:  EKG from this morning, no acute changes    Comparison to cardiac monitor: Yes    Interpretation:     Interpretation: normal    Rate:     ECG rate:  97    ECG rate assessment: normal    Rhythm:     Rhythm: sinus rhythm    Ectopy:      Ectopy: none    QRS:     QRS axis:  Normal    QRS intervals:  Normal  Conduction:     Conduction: normal    ST segments:     ST segments:  Normal  T waves:     T waves: normal    Comments:      Sinus rhythm, normal axis, normal intervals, no acute ischemic changes    GI:  No orders to display       Results from last 7 days   Lab Units 10/08/24  0532 10/07/24  0904 10/06/24  0836   WBC Thousand/uL 9.70 10.47* 12.00*   HEMOGLOBIN g/dL 10.7* 11.8* 11.8*   HEMATOCRIT % 34.4* 37.1 37.2   PLATELETS Thousands/uL 400* 436* 428*   TOTAL NEUT ABS Thousands/µL 6.32 8.04*  --      Results from last 7 days   Lab Units 10/08/24  0532 10/07/24  0904 10/06/24  1536 10/06/24  0836   SODIUM mmol/L 137 139 137 138   POTASSIUM mmol/L 3.5 3.7 3.5 3.4*   CHLORIDE mmol/L 100 100 96 93*   CO2 mmol/L 28 29 28 26   ANION GAP mmol/L 9 10 13 19*   BUN mg/dL 10 9 9 10   CREATININE mg/dL 0.64 0.69 0.68 0.76   EGFR ml/min/1.73sq m 101 98 98 94   CALCIUM mg/dL 8.8 9.0 9.1 9.9   MAGNESIUM mg/dL 1.8* 1.9  --  1.2*     Results from last 7 days   Lab Units 10/06/24  0836   AST U/L 17   ALT U/L 11   ALK PHOS U/L 83   TOTAL PROTEIN g/dL 7.8   ALBUMIN g/dL 4.3   TOTAL BILIRUBIN mg/dL 0.41     Results from last 7 days   Lab Units 10/08/24  1157 10/08/24  0734 10/07/24  2112 10/07/24  1555 10/07/24  1138 10/07/24  0721 10/06/24  2055 10/06/24  1629   POC GLUCOSE mg/dl 206* 135 179* 128 148* 123 149* 131     Results from last 7 days   Lab Units 10/08/24  0532 10/07/24  0904 10/06/24  1536 10/06/24  0836   GLUCOSE RANDOM mg/dL 112 169* 146* 208*     Results from last 7 days   Lab Units 10/06/24  0836   HEMOGLOBIN A1C % 5.9*   EAG mg/dl 123     Beta- Hydroxybutyrate   Date Value Ref Range Status   10/06/2024 2.04 (H) 0.02 - 0.27 mmol/L Final      Results from last 7 days   Lab Units 10/07/24  0904 10/06/24  1536   PH JANE  7.452* 7.420*   PCO2 JANE mm Hg 37.2* 43.5   PO2 JANE mm Hg 70.5* 43.5   HCO3 JANE mmol/L 25.4 27.6   BASE EXC JANE mmol/L 1.6 2.7   O2  CONTENT JANE ml/dL 16.3 12.8   O2 HGB, VENOUS % 91.3* 77.3     Results from last 7 days   Lab Units 10/06/24  0836   CK TOTAL U/L 54     Results from last 7 days   Lab Units 10/06/24  1249 10/06/24  1103 10/06/24  0836   HS TNI 0HR ng/L  --   --  135*   HS TNI 2HR ng/L  --  129*  --    HSTNI D2 ng/L  --  -6  --    HS TNI 4HR ng/L 143*  --   --    HSTNI D4 ng/L 8  --   --      Results from last 7 days   Lab Units 10/06/24  1536   LACTIC ACID mmol/L 1.1     Results from last 7 days   Lab Units 10/06/24  0836   LIPASE u/L <6*     Results from last 7 days   Lab Units 10/06/24  0836   CRP mg/L 31.7*   SED RATE mm/hour 62*     Results from last 7 days   Lab Units 10/07/24  0238   CLARITY UA  Clear   COLOR UA  Yellow   SPEC GRAV UA  1.010   PH UA  7.0   GLUCOSE UA mg/dl 1000 (1%)*   KETONES UA mg/dl 10 (1+)*   BLOOD UA  Negative   PROTEIN UA mg/dl 30 (1+)*   NITRITE UA  Negative   BILIRUBIN UA  Negative   UROBILINOGEN UA (BE) mg/dl <2.0   LEUKOCYTES UA  Trace*   WBC UA /hpf 10-20*   RBC UA /hpf None Seen   BACTERIA UA /hpf None Seen   EPITHELIAL CELLS WET PREP /hpf None Seen   MUCUS THREADS  None Seen         Past Medical History:   Diagnosis Date    Anxiety     Arthritis     Cancer (HCC)     Chronic back pain     Depression     Diabetes mellitus (HCC)     Hypertension     Liver disease     Mitral valve prolapse     Peripheral neuropathy     Neuropathy    PONV (postoperative nausea and vomiting)     Thyroid disease      Present on Admission:   Hypertensive urgency   Nausea and vomiting   Chronic pain syndrome   Type 2 diabetes mellitus with hyperglycemia, without long-term current use of insulin (HCC)   Thyroid cancer (HCC)   Benign prostatic hyperplasia with urinary frequency   Hypokalemia      Admitting Diagnosis: Low back pain [M54.50]  Back pain [M54.9]  Nausea and vomiting [R11.2]  Elevated troponin [R79.89]  Bilateral hip pain [M25.551, M25.552]  Fall, initial encounter [W19.XXXA]  Age/Sex: 69 y.o. male    Network  Utilization Review Department  ATTENTION: Please call with any questions or concerns to [phone] and carefully listen to the prompts so that you are directed to the right person. All voicemails are confidential.   For Discharge needs, contact Care Management DC Support Team at [phone] opt. [phone]  Send all requests for admission clinical reviews, approved or denied determinations and any other requests to dedicated fax number below belonging to the campus where the patient is receiving treatment. List of dedicated fax numbers for the Facilities:  FACILITY NAME UR FAX NUMBER   ADMISSION DENIALS (Administrative/Medical Necessity) [phone]   DISCHARGE SUPPORT TEAM (NETWORK) [phone]   PARENT CHILD HEALTH (Maternity/NICU/Pediatrics) [phone]   [facility] [phone]   [facility] [phone]   [facility] [phone]   [facility] [phone]   [facility] [phone]   [facility] [phone]   [facility] [phone]   [facility] [phone]   [facility] [phone]   [facility] [phone]   [facility] [phone]   [facility] [phone]

## 2024-10-07 NOTE — OCCUPATIONAL THERAPY NOTE
Occupational Therapy Evaluation      Juan San    10/7/2024    Principal Problem:    Hypertensive urgency  Active Problems:    Thyroid cancer (HCC)    Chronic pain syndrome    Nausea and vomiting    Benign prostatic hyperplasia with urinary frequency    Type 2 diabetes mellitus with hyperglycemia, without long-term current use of insulin (HCC)    Elevated troponin I level    Ambulatory dysfunction    SIRS (systemic inflammatory response syndrome) (HCC)    Hypokalemia    Metabolic acidosis, increased anion gap      Past Medical History:   Diagnosis Date    Anxiety     Arthritis     Cancer (HCC)     Chronic back pain     Depression     Diabetes mellitus (HCC)     Hypertension     Liver disease     Mitral valve prolapse     Peripheral neuropathy     Neuropathy    PONV (postoperative nausea and vomiting)     Thyroid disease        Past Surgical History:   Procedure Laterality Date    COLONOSCOPY      INCISION AND DRAINAGE OF WOUND Left 08/12/2022    Procedure: INCISION AND DRAINAGE (I&D) EXTREMITY;  Surgeon: Moustapha Cote DPM;  Location:  MAIN OR;  Service: Podiatry    IR BIOPSY SPINE  1/30/2024    IR BIOPSY SPINE  2/1/2024    IR PICC PLACEMENT SINGLE LUMEN  2/6/2024    JOINT REPLACEMENT Left 03/11/2022    Left TSA    DC ARTHRODESIS POSTERIOR/PSTLAT TQ 1NTRSPC LUMBAR Bilateral 04/11/2023    Procedure: L1-S1 navigated posterior decompression with instrumented fixation fusion;  Surgeon: James Moraes MD;  Location:  MAIN OR;  Service: Neurosurgery    THYROID SURGERY  2021    remove cancer        10/07/24 1020   OT Last Visit   OT Visit Date 10/07/24   Note Type   Note type Evaluation   Pain Assessment   Pain Assessment Tool 0-10   Pain Score 10 - Worst Possible Pain   Pain Location/Orientation Orientation: Lower;Location: Back  (chronic)   Hospital Pain Intervention(s) Repositioned;Ambulation/increased activity   Restrictions/Precautions   Weight Bearing Precautions Per Order No   Other Precautions Chair  Alarm;Bed Alarm;Fall Risk;Pain   Home Living   Type of Home House   Home Layout One level  (1STE)   Bathroom Equipment Shower chair;Grab bars in shower   Home Equipment Walker;Cane   Prior Function   Level of [place] Independent with ADLs;Independent with functional mobility   Lives With Spouse   IADLs Family/Friend/Other provides transportation   Falls in the last 6 months 1 to 4   Comments Reports spouse can assist as needed when he has high pain, but doesn't typically require assistance   ADL   Eating Assistance 7  Independent   Grooming Assistance 7  Independent   UB Bathing Assistance 6  Modified Independent   LB Bathing Assistance 6  Modified Independent   UB Dressing Assistance 6  Modified independent   LB Dressing Assistance 6  Modified independent   Toileting Assistance  6  Modified independent   Additional Comments Limited only 2* pain   Bed Mobility   Supine to Sit 6  Modified independent   Transfers   Sit to Stand 6  Modified independent   Stand to Sit 6  Modified independent   Stand pivot 6  Modified independent  (RW)   Functional Mobility   Functional Mobility 5  Supervision   Additional items Rolling walker   Activity Tolerance   Activity Tolerance Patient limited by pain   Medical Staff Made Aware PT Lea   Nurse Made Aware NATE ROMERO Assessment   RUE Assessment WFL   LUE Assessment   LUE Assessment WFL   Hand Function   Gross Motor Coordination Functional   Fine Motor Coordination Functional   Cognition   Overall Cognitive Status WFL   Arousal/Participation Alert;Cooperative   Attention Within functional limits   Orientation Level Oriented X4   Memory Within functional limits   Following Commands Follows all commands and directions without difficulty   Assessment   Prognosis Good   Assessment Pt is a 69 y.o. male seen for OT evaluation at [facility], admitted 10/6/2024 w/ Hypertensive urgency.  OT completed extensive review of pt's medical and social history. Comorbidities  affecting pt's functional performance at time of assessment include: thyroid cancer, chronic pain, DM type 2, ambulatory dysfunction, chronic low back pain, anxiety & depression, HTN. Prior to admission, pt was living with spouse, in 1SH, 1STE, and was independent with ADL/IADL, using RW. Upon evaluation, pt presents to OT close to functional baseline, only restricted by pain. The patient's raw score on the AM-PAC Daily Activity inpatient short form is 23, standardized score is 51.12, greater than 39.4. Patients at this level are likely to benefit from DC to home. Based on findings, pt is of high complexity, due to medical comorbidities. Pt seen as a co-eval with PT due to the patient's co-morbidities, clinically unstable presentation, and present impairments which are a regression from the patient's baseline. At this time, OT recommendations at time of discharge are level 4. No further acute OT needs indicated at this time - Recommend pt continue to be OOB for meals, ambulation to/from BR, perform self care tasks, and mobility in hallway with nursing. D/C from OT caseload with above recommendations.   Goals   Patient Goals less pain, though also states pain is 10/10 at baseline   Plan   OT Frequency Eval only   Discharge Recommendation   Rehab Resource Intensity Level, OT No post-acute rehabilitation needs   AM-PAC Daily Activity Inpatient   Lower Body Dressing 3   Bathing 4   Toileting 4   Upper Body Dressing 4   Grooming 4   Eating 4   Daily Activity Raw Score 23   Daily Activity Standardized Score (Calc for Raw Score >=11) 51.12   AM-PAC Applied Cognition Inpatient   Following a Speech/Presentation 4   Understanding Ordinary Conversation 4   Taking Medications 4   Remembering Where Things Are Placed or Put Away 4   Remembering List of 4-5 Errands 4   Taking Care of Complicated Tasks 4   Applied Cognition Raw Score 24   Applied Cognition Standardized Score 62.21     Diana Wen MS, OTR/L

## 2024-10-07 NOTE — PROGRESS NOTES
Patient:  WINSOME LUGO    MRN:  [id number]    Aidin Request ID:  [id number]    Level of care reserved:  Home Health Agency    Partner Reserved:  [facility], [place] [address] [phone]    Clinical needs requested:    Geography searched:  [phone]    Start of Service:    Request sent:  2:34pm EDT on 10/7/2024 by Justice Steele    Partner reserved:  4:14pm EDT on 10/7/2024 by Justice Steele    Choice list shared:  3:17pm EDT on 10/7/2024 by Justice Steele

## 2024-10-07 NOTE — PLAN OF CARE
Problem: PAIN - ADULT  Goal: Verbalizes/displays adequate comfort level or baseline comfort level  Description: Interventions:  - Encourage patient to monitor pain and request assistance  - Assess pain using appropriate pain scale  - Administer analgesics based on type and severity of pain and evaluate response  - Implement non-pharmacological measures as appropriate and evaluate response  - Consider cultural and social influences on pain and pain management  - Notify physician/advanced practitioner if interventions unsuccessful or patient reports new pain  Outcome: Progressing     Problem: INFECTION - ADULT  Goal: Absence or prevention of progression during hospitalization  Description: INTERVENTIONS:  - Assess and monitor for signs and symptoms of infection  - Monitor lab/diagnostic results  - Monitor all insertion sites, i.e. indwelling lines, tubes, and drains  - Monitor endotracheal if appropriate and nasal secretions for changes in amount and color  - [place] appropriate cooling/warming therapies per order  - Administer medications as ordered  - Instruct and encourage patient and family to use good hand hygiene technique  - Identify and instruct in appropriate isolation precautions for identified infection/condition  Outcome: Progressing  Goal: Absence of fever/infection during neutropenic period  Description: INTERVENTIONS:  - Monitor WBC    Outcome: Progressing

## 2024-10-07 NOTE — ASSESSMENT & PLAN NOTE
Anion gap noted to be 19 on admission, resolved   Lactic acid wnl  BHB elevated   Suspect related to recent vomiting/starvation,  S/p 1 L IVF in the ED and maintenance IVF  Unclear accuracy of repeat VBG today, reveals alkalosis, AG closed on BMP today  Diet advanced, if tolerating will DC IVF and check AM BMP

## 2024-10-07 NOTE — PROGRESS NOTES
Patient:  WINSOME LUGO    MRN:  [id number]    Aidin Request ID:  [id number]    Level of care reserved:  Home Health Agency    Partner Reserved:  [facility], [place] [address] [phone]    Clinical needs requested:    Geography searched:  [id number]    Start of Service:    Request sent:  2:34pm EDT on 10/7/2024 by Justice Steele    Partner reserved:  4:14pm EDT on 10/7/2024 by Justice Steele    Choice list shared:  3:17pm EDT on 10/7/2024 by Justice Steele

## 2024-10-08 VITALS
HEART RATE: 72 BPM | WEIGHT: 179.68 LBS | BODY MASS INDEX: 27.23 KG/M2 | OXYGEN SATURATION: 99 % | RESPIRATION RATE: 20 BRPM | SYSTOLIC BLOOD PRESSURE: 152 MMHG | HEIGHT: 68 IN | TEMPERATURE: 98.5 F | DIASTOLIC BLOOD PRESSURE: 83 MMHG

## 2024-10-08 LAB
ANION GAP SERPL CALCULATED.3IONS-SCNC: 9 MMOL/L (ref 4–13)
BACTERIA UR CULT: NORMAL
BASOPHILS # BLD AUTO: 0.06 THOUSANDS/ΜL (ref 0–0.1)
BASOPHILS NFR BLD AUTO: 1 % (ref 0–1)
BUN SERPL-MCNC: 10 MG/DL (ref 5–25)
CALCIUM SERPL-MCNC: 8.8 MG/DL (ref 8.4–10.2)
CHLORIDE SERPL-SCNC: 100 MMOL/L (ref 96–108)
CO2 SERPL-SCNC: 28 MMOL/L (ref 21–32)
CREAT SERPL-MCNC: 0.64 MG/DL (ref 0.6–1.3)
EOSINOPHIL # BLD AUTO: 0.11 THOUSAND/ΜL (ref 0–0.61)
EOSINOPHIL NFR BLD AUTO: 1 % (ref 0–6)
ERYTHROCYTE [DISTWIDTH] IN BLOOD BY AUTOMATED COUNT: 18.7 % (ref 11.6–15.1)
GFR SERPL CREATININE-BSD FRML MDRD: 101 ML/MIN/1.73SQ M
GLUCOSE SERPL-MCNC: 112 MG/DL (ref 65–140)
GLUCOSE SERPL-MCNC: 135 MG/DL (ref 65–140)
GLUCOSE SERPL-MCNC: 206 MG/DL (ref 65–140)
HCT VFR BLD AUTO: 34.4 % (ref 36.5–49.3)
HGB BLD-MCNC: 10.7 G/DL (ref 12–17)
IMM GRANULOCYTES # BLD AUTO: 0.04 THOUSAND/UL (ref 0–0.2)
IMM GRANULOCYTES NFR BLD AUTO: 0 % (ref 0–2)
LYMPHOCYTES # BLD AUTO: 2.27 THOUSANDS/ΜL (ref 0.6–4.47)
LYMPHOCYTES NFR BLD AUTO: 23 % (ref 14–44)
MAGNESIUM SERPL-MCNC: 1.8 MG/DL (ref 1.9–2.7)
MCH RBC QN AUTO: 25.9 PG (ref 26.8–34.3)
MCHC RBC AUTO-ENTMCNC: 31.1 G/DL (ref 31.4–37.4)
MCV RBC AUTO: 83 FL (ref 82–98)
MONOCYTES # BLD AUTO: 0.9 THOUSAND/ΜL (ref 0.17–1.22)
MONOCYTES NFR BLD AUTO: 9 % (ref 4–12)
NEUTROPHILS # BLD AUTO: 6.32 THOUSANDS/ΜL (ref 1.85–7.62)
NEUTS SEG NFR BLD AUTO: 66 % (ref 43–75)
NRBC BLD AUTO-RTO: 0 /100 WBCS
PLATELET # BLD AUTO: 400 THOUSANDS/UL (ref 149–390)
PMV BLD AUTO: 9.8 FL (ref 8.9–12.7)
POTASSIUM SERPL-SCNC: 3.5 MMOL/L (ref 3.5–5.3)
RBC # BLD AUTO: 4.13 MILLION/UL (ref 3.88–5.62)
SODIUM SERPL-SCNC: 137 MMOL/L (ref 135–147)
WBC # BLD AUTO: 9.7 THOUSAND/UL (ref 4.31–10.16)

## 2024-10-08 PROCEDURE — 99239 HOSP IP/OBS DSCHRG MGMT >30: CPT | Performed by: NURSE PRACTITIONER

## 2024-10-08 PROCEDURE — 80048 BASIC METABOLIC PNL TOTAL CA: CPT | Performed by: PHYSICIAN ASSISTANT

## 2024-10-08 PROCEDURE — 83735 ASSAY OF MAGNESIUM: CPT | Performed by: PHYSICIAN ASSISTANT

## 2024-10-08 PROCEDURE — 82948 REAGENT STRIP/BLOOD GLUCOSE: CPT

## 2024-10-08 PROCEDURE — 85025 COMPLETE CBC W/AUTO DIFF WBC: CPT | Performed by: PHYSICIAN ASSISTANT

## 2024-10-08 RX ORDER — TRAMADOL HYDROCHLORIDE 50 MG/1
50 TABLET ORAL EVERY 6 HOURS PRN
Qty: 10 TABLET | Refills: 0 | Status: SHIPPED | OUTPATIENT
Start: 2024-10-08 | End: 2024-10-09

## 2024-10-08 RX ORDER — TRAMADOL HYDROCHLORIDE 50 MG/1
50 TABLET ORAL EVERY 6 HOURS PRN
Qty: 10 TABLET | Refills: 0 | Status: SHIPPED | OUTPATIENT
Start: 2024-10-08 | End: 2024-10-08

## 2024-10-08 RX ADMIN — PANTOPRAZOLE SODIUM 40 MG: 40 INJECTION, POWDER, FOR SOLUTION INTRAVENOUS at 09:51

## 2024-10-08 RX ADMIN — FOLIC ACID 2000 MCG: 1 TABLET ORAL at 09:47

## 2024-10-08 RX ADMIN — ESCITALOPRAM OXALATE 20 MG: 20 TABLET ORAL at 09:47

## 2024-10-08 RX ADMIN — METOPROLOL SUCCINATE 50 MG: 50 TABLET, EXTENDED RELEASE ORAL at 09:47

## 2024-10-08 RX ADMIN — GABAPENTIN 100 MG: 100 CAPSULE ORAL at 09:47

## 2024-10-08 RX ADMIN — CLONAZEPAM 1 MG: 1 TABLET ORAL at 09:47

## 2024-10-08 RX ADMIN — INSULIN LISPRO 2 UNITS: 100 INJECTION, SOLUTION INTRAVENOUS; SUBCUTANEOUS at 13:13

## 2024-10-08 RX ADMIN — NIFEDIPINE 120 MG: 30 TABLET, FILM COATED, EXTENDED RELEASE ORAL at 09:47

## 2024-10-08 RX ADMIN — OXYCODONE HYDROCHLORIDE 5 MG: 5 TABLET ORAL at 14:34

## 2024-10-08 RX ADMIN — TRAMADOL HYDROCHLORIDE 50 MG: 50 TABLET, COATED ORAL at 12:08

## 2024-10-08 RX ADMIN — OXYCODONE HYDROCHLORIDE 5 MG: 5 TABLET ORAL at 09:47

## 2024-10-08 RX ADMIN — FINASTERIDE 5 MG: 5 TABLET, FILM COATED ORAL at 09:47

## 2024-10-08 RX ADMIN — OXYCODONE HYDROCHLORIDE 5 MG: 5 TABLET ORAL at 02:36

## 2024-10-08 RX ADMIN — LIDOCAINE 1 PATCH: 700 PATCH TOPICAL at 09:46

## 2024-10-08 RX ADMIN — ASPIRIN 81 MG: 81 TABLET, COATED ORAL at 09:47

## 2024-10-08 RX ADMIN — ENOXAPARIN SODIUM 40 MG: 100 INJECTION SUBCUTANEOUS at 09:46

## 2024-10-08 RX ADMIN — SENNOSIDES 8.6 MG: 8.6 TABLET, FILM COATED ORAL at 09:47

## 2024-10-08 NOTE — ASSESSMENT & PLAN NOTE
Presented to the emergency department with bilateral hip pain following mechanical fall  CT recon lumbar spine reveals extensive disc disease but unchanged from prior study in September   Pain control regimen as an outpatient consists of tylenol and robaxin which does not adequately treat him but he has no prescribing physician for his chronic pain. Had a long discussion that this will need to be addressed with the [place] surgeons who are planning on spinal surgery at the end of the month.   Fall precautions  PT/OT evaluation-level III- he is able to go home today, discussed with his wife

## 2024-10-08 NOTE — DISCHARGE SUMMARY
Discharge Summary - Hospitalist   Name: Juan San 69 y.o. male I MRN: [id number]  Unit/Bed#: -01 I Date of Admission: 10/6/2024   Date of Service: 10/8/2024 I Hospital Day: 1     Assessment & Plan  Hypertensive urgency  Resolved  Presented to the emergency department following mechanical fall and subsequent hip pain  Accelerated blood pressures on admission, SBP in the 200s  S/p IV labetalol x 1  CT head negative for acute intracranial abnormalities  Home regimen includes Toprol XL 50 mg twice daily, Procardia 60 mg twice daily -continue this antihypertensive regimen  May also be driven by poor pain control  This has stabilized.   Nausea and vomiting  Reports recurrent episodes intermittently at home, he feels related to excessive post nasal drip but he also admits to frequent use ( medical MJ for back pain)  Suspect this may be cyclic vomiting syndrome  CTH negative but does show mucus retention cysts in maxillary sinuses, reports he has been on Zyrtec with little relief  CT abdomen/pelvis unremarkable  Continue supportive care with IVF, antiemetics  Advanced to regular diet and tolerated that well   May benefit from ENT referral on discharge for treatment/management of post nasal drip and sinus cysts  Discussed with pt and wife that if these episodes continue to recur, may consider stopping MJ use as it could be triggering cyclic vomiting. Unfortunately the marijuana helps with this pain.   Elevated troponin I level  Elevated troponin 135-->129-->143-->135  EKG without ischemic changes  Troponins peaked  Suspect in setting of elevated blood pressures  Chronic pain syndrome  Home regimen includes tramadol 50 mg every 8 hours as needed  Last filled 10/3/2024 per PDMP  Difficulty with acute on chronic pain control presently following fall, has required oxycodone for severe pain however none of his outpatient providers prescribe this medication for him   Encouraged to follow up with pain management on  discharge  Type 2 diabetes mellitus with hyperglycemia, without long-term current use of insulin (HCC)  Lab Results   Component Value Date    HGBA1C 5.9 (H) 10/06/2024       Recent Labs     10/07/24  1555 10/07/24  2112 10/08/24  0734 10/08/24  1157   POCGLU 128 179* 135 206*       Blood Sugar Average: Last 72 hrs:  (P) 149.875Held  metformin and Jardiance while inpatient, can resume at discharge   SSI plus Accu-Chek  Updated A1c 5.9    Thyroid cancer (HCC)  S/p partial thyroidectomy  Not maintained on Synthroid  Follows with Horacio Jackson  Benign prostatic hyperplasia with urinary frequency  Continue Flomax, Proscar  Ambulatory dysfunction  Presented to the emergency department with bilateral hip pain following mechanical fall  CT recon lumbar spine reveals extensive disc disease but unchanged from prior study in September   Pain control regimen as an outpatient consists of tylenol and robaxin which does not adequately treat him but he has no prescribing physician for his chronic pain. Had a long discussion that this will need to be addressed with the [place] surgeons who are planning on spinal surgery at the end of the month.   Fall precautions  PT/OT evaluation-level III- he is able to go home today, discussed with his wife   SIRS (systemic inflammatory response syndrome) (HCC)  Tachycardia resolved and leukocytosis nearly resolved without abx  Patient met SIRS criteria with tachycardia, leukocytosis in setting of vomiting  CT abdomen/pelvis without acute findings with exception of hypodensities on prostate  UA bland  No e/o prostatitis  Trend WBC and fever curve  Monitored off antibiotics, low suspicion for infectious process  Hypokalemia  Potassium 3.4, mag 1.2 on admission   Resolved following repletion  Metabolic acidosis, increased anion gap  Anion gap noted to be 19 on admission, resolved   Lactic acid wnl  BHB elevated   Suspect related to recent vomiting/starvation,  S/p 1 L IVF in the ED and maintenance  IVF  Resolved on repeat labs      Medical Problems       Resolved Problems  Date Reviewed: 10/8/2024   None       Discharging Physician / Practitioner: RUIZ Page  PCP: Sabra Magaña DO  Admission Date:   Admission Orders (From admission, onward)       Ordered        10/07/24 1140  INPATIENT ADMISSION  Once            10/06/24 1239  Place in Observation  Once                          Discharge Date: 10/08/24    Consultations During Hospital Stay:  none    Procedures Performed:   CXR negative   CT head no acute intracranial hemorrhage   CT A/P No acute findings in the abdomen or pelvis. No acute fracture.     2. Stable appearance of the lumbar spine compared to CT 9/20/2024.     3. Prostatomegaly with hypodense foci bilaterally. Please refer to previous MRI of the pelvis from 8/17/2024. Correlate for any  suspicion of prostatitis.     Significant Findings / Test Results:   none    Incidental Findings:   none       Test Results Pending at Discharge (will require follow up):   none     Outpatient Tests Requested:  non    Complications:  none    Reason for Admission: nausea and vomiting     Hospital Course:   Juan San is a 69 y.o. male patient who originally presented to the hospital on 10/6/2024 due to nausea and vomiting.  Patient has chronic pain in his bilateral hips for which she smokes marijuana.  He has had this for many years and has difficulty obtaining adequate pain control as an outpatient.  He states his primary care provider will not prescribe opioids for him.  He also had a headache and a fall at home.  Trauma scans did not reveal any fractures.  CT of the head was negative.  His blood pressure was elevated on admission likely due to the stress of the fall and pain.  We attempted to control his pain here and upon further discussion with the patient, and his wife, he uses marijuana at home for pain control.  The marijuana could be causing cyclic vomiting syndrome as he has these  "episodes every few weeks.  Discussion about focusing on the pain control with alternative means occurred with him and his wife.  He is experiencing extreme grief from the loss of his brothers within days of each other last week.  He is pending spinal surgery with [facility]con in the near future and discussion regarding pain control with that team should occur perioperatively.  His symptoms have resolved and he is tolerating his diet well.  He is stable to be discharged home today.          Please see above list of diagnoses and related plan for additional information.     Condition at Discharge: stable    Discharge Day Visit / Exam:   Subjective: His pain is not well-controlled overall but he does feel well enough to go home today.  He would like to go to grief class tonight.  Vitals: Blood Pressure: 152/83 (10/08/24 0735)  Pulse: 72 (10/07/24 0352)  Temperature: 98.5 °F (36.9 °C) (10/08/24 0735)  Temp Source: Oral (10/06/24 1328)  Respirations: 20 (10/08/24 0735)  Height: 5' 8\" (172.7 cm) (10/06/24 1328)  Weight - Scale: 81.5 kg (179 lb 10.8 oz) (10/06/24 1328)  SpO2: 99 % (10/07/24 0352)  Physical Exam  Constitutional:       Appearance: He is not ill-appearing.   HENT:      Head: Normocephalic.      Mouth/Throat:      Mouth: Mucous membranes are moist.   Cardiovascular:      Rate and Rhythm: Normal rate and regular rhythm.      Pulses: Normal pulses.      Heart sounds: Normal heart sounds.   Pulmonary:      Effort: Pulmonary effort is normal.      Breath sounds: Normal breath sounds.   Abdominal:      General: Abdomen is flat.      Palpations: Abdomen is soft.   Musculoskeletal:         General: No swelling. Normal range of motion.   Skin:     General: Skin is warm and dry.      Capillary Refill: Capillary refill takes less than 2 seconds.   Neurological:      General: No focal deficit present.      Mental Status: He is alert and oriented to person, place, and time.   Psychiatric:         Mood and Affect: Mood normal.  "        Behavior: Behavior normal.            Discussion with Family: Updated  (wife) at bedside.    Discharge instructions/Information to patient and family:   See after visit summary for information provided to patient and family.      Provisions for Follow-Up Care:  See after visit summary for information related to follow-up care and any pertinent home health orders.      Mobility at time of Discharge:   Basic Mobility Inpatient Raw Score: 22  JH-HLM Goal: 7: Walk 25 feet or more  JH-HLM Achieved: 7: Walk 25 feet or more  HLM Goal achieved. Continue to encourage appropriate mobility.     Disposition:   Home    Planned Readmission: not anticipated     Discharge Medications:  See after visit summary for reconciled discharge medications provided to patient and/or family.      Administrative Statements   Discharge Statement:  I have spent a total time of 38 minutes in caring for this patient on the day of the visit/encounter. >30 minutes of time was spent on: Instructions for management, Patient and family education, Importance of tx compliance, Counseling / Coordination of care, Documenting in the medical record, and Reviewing / ordering tests, medicine, procedures  .    **Please Note: This note may have been constructed using a voice recognition system**

## 2024-10-08 NOTE — PLAN OF CARE
Problem: Potential for Falls  Goal: Patient will remain free of falls  Description: INTERVENTIONS:  - Educate patient/family on patient safety including physical limitations  - Instruct patient to call for assistance with activity   - Consult OT/PT to assist with strengthening/mobility   - Keep Call bell within reach  - Keep bed low and locked with side rails adjusted as appropriate  - Keep care items and personal belongings within reach  - Initiate and maintain comfort rounds  - Make Fall Risk Sign visible to staff  - Offer Toileting every 2 Hours, in advance of need  - Initiate/Maintain bed/chair alarm  - Obtain necessary fall risk management equipment:   - Apply yellow socks and bracelet for high fall risk patients  - Consider moving patient to room near nurses station  Outcome: Progressing     Problem: PAIN - ADULT  Goal: Verbalizes/displays adequate comfort level or baseline comfort level  Description: Interventions:  - Encourage patient to monitor pain and request assistance  - Assess pain using appropriate pain scale  - Administer analgesics based on type and severity of pain and evaluate response  - Implement non-pharmacological measures as appropriate and evaluate response  - Consider cultural and social influences on pain and pain management  - Notify physician/advanced practitioner if interventions unsuccessful or patient reports new pain  Outcome: Progressing     Problem: INFECTION - ADULT  Goal: Absence or prevention of progression during hospitalization  Description: INTERVENTIONS:  - Assess and monitor for signs and symptoms of infection  - Monitor lab/diagnostic results  - Monitor all insertion sites, i.e. indwelling lines, tubes, and drains  - Monitor endotracheal if appropriate and nasal secretions for changes in amount and color  - [place] appropriate cooling/warming therapies per order  - Administer medications as ordered  - Instruct and encourage patient and family to use good hand hygiene  technique  - Identify and instruct in appropriate isolation precautions for identified infection/condition  Outcome: Progressing  Goal: Absence of fever/infection during neutropenic period  Description: INTERVENTIONS:  - Monitor WBC    Outcome: Progressing     Problem: SAFETY ADULT  Goal: Patient will remain free of falls  Description: INTERVENTIONS:  - Educate patient/family on patient safety including physical limitations  - Instruct patient to call for assistance with activity   - Consult OT/PT to assist with strengthening/mobility   - Keep Call bell within reach  - Keep bed low and locked with side rails adjusted as appropriate  - Keep care items and personal belongings within reach  - Initiate and maintain comfort rounds  - Make Fall Risk Sign visible to staff  - Offer Toileting every 2 Hours, in advance of need  - Initiate/Maintain bed/chair alarm  - Obtain necessary fall risk management equipment:   - Apply yellow socks and bracelet for high fall risk patients  - Consider moving patient to room near nurses station  Outcome: Progressing  Goal: Maintain or return to baseline ADL function  Description: INTERVENTIONS:  -  Assess patient's ability to carry out ADLs; assess patient's baseline for ADL function and identify physical deficits which impact ability to perform ADLs (bathing, care of mouth/teeth, toileting, grooming, dressing, etc.)  - Assess/evaluate cause of self-care deficits   - Assess range of motion  - Assess patient's mobility; develop plan if impaired  - Assess patient's need for assistive devices and provide as appropriate  - Encourage maximum independence but intervene and supervise when necessary  - Involve family in performance of ADLs  - Assess for home care needs following discharge   - Consider OT consult to assist with ADL evaluation and planning for discharge  - Provide patient education as appropriate  Outcome: Progressing  Goal: Maintains/Returns to pre admission functional  level  Description: INTERVENTIONS:  - Perform AM-PAC 6 Click Basic Mobility/ Daily Activity assessment daily.  - Set and communicate daily mobility goal to care team and patient/family/caregiver.   - Collaborate with rehabilitation services on mobility goals if consulted  - Perform Range of Motion 3 times a day.  - Reposition patient every 3 hours.  - Dangle patient 3 times a day  - Stand patient 3 times a day  - Ambulate patient 3 times a day  - Out of bed to chair 3 times a day   - Out of bed for meals 3 times a day  - Out of bed for toileting  - Record patient progress and toleration of activity level   Outcome: Progressing     Problem: DISCHARGE PLANNING  Goal: Discharge to home or other facility with appropriate resources  Description: INTERVENTIONS:  - Identify barriers to discharge w/patient and caregiver  - Arrange for needed discharge resources and transportation as appropriate  - Identify discharge learning needs (meds, wound care, etc.)  - Arrange for interpretive services to assist at discharge as needed  - Refer to Case Management Department for coordinating discharge planning if the patient needs post-hospital services based on physician/advanced practitioner order or complex needs related to functional status, cognitive ability, or social support system  Outcome: Progressing     Problem: Knowledge Deficit  Goal: Patient/family/caregiver demonstrates understanding of disease process, treatment plan, medications, and discharge instructions  Description: Complete learning assessment and assess knowledge base.  Interventions:  - Provide teaching at level of understanding  - Provide teaching via preferred learning methods  Outcome: Progressing     Problem: SKIN/TISSUE INTEGRITY - ADULT  Goal: Skin Integrity remains intact(Skin Breakdown Prevention)  Description: Assess:  -Perform Sanjay assessment every shift  -Clean and moisturize skin every shift and PRN  -Inspect skin when repositioning, toileting, and  assisting with ADLS  -Assess under medical devices  -Assess extremities for adequate circulation and sensation     Bed Management:  -Have minimal linens on bed & keep smooth, unwrinkled  -Change linens as needed when moist or perspiring  -Avoid sitting or lying in one position for more than 2 hours while in bed  -Keep HOB at 30 degrees     Toileting:  -Offer bedside commode  -Assess for incontinence   -Use incontinent care products after each incontinent episode     Activity:  -Mobilize patient 2 times a day  -Encourage activity and walks on unit  -Encourage or provide ROM exercises   -Turn and reposition patient every 2 Hours  -Use appropriate equipment to lift or move patient in bed  -Instruct/ Assist with weight shifting every 2 when out of bed in chair  -Consider limitation of chair time 2 hour intervals    Skin Care:  -Avoid use of baby powder, tape, friction and shearing, hot water or constrictive clothing  -Relieve pressure over bony prominences   -Do not massage red bony areas    Next Steps:  -Teach patient strategies to minimize risks   -Consider consults to  interdisciplinary teams   Outcome: Progressing  Goal: Incision(s), wounds(s) or drain site(s) healing without S/S of infection  Description: INTERVENTIONS  - Assess and document dressing, incision, wound bed, drain sites and surrounding tissue  - Provide patient and family education  - Perform skin care/dressing changes every shift and PRN  Outcome: Progressing     Problem: MOBILITY - ADULT  Goal: Maintain or return to baseline ADL function  Description: INTERVENTIONS:  -  Assess patient's ability to carry out ADLs; assess patient's baseline for ADL function and identify physical deficits which impact ability to perform ADLs (bathing, care of mouth/teeth, toileting, grooming, dressing, etc.)  - Assess/evaluate cause of self-care deficits   - Assess range of motion  - Assess patient's mobility; develop plan if impaired  - Assess patient's need for  assistive devices and provide as appropriate  - Encourage maximum independence but intervene and supervise when necessary  - Involve family in performance of ADLs  - Assess for home care needs following discharge   - Consider OT consult to assist with ADL evaluation and planning for discharge  - Provide patient education as appropriate  Outcome: Progressing  Goal: Maintains/Returns to pre admission functional level  Description: INTERVENTIONS:  - Perform AM-PAC 6 Click Basic Mobility/ Daily Activity assessment daily.  - Set and communicate daily mobility goal to care team and patient/family/caregiver.   - Collaborate with rehabilitation services on mobility goals if consulted  - Perform Range of Motion 3 times a day.  - Reposition patient every 3 hours.  - Dangle patient 3 times a day  - Stand patient 3 times a day  - Ambulate patient 3 times a day  - Out of bed to chair 3 times a day   - Out of bed for meals 3 times a day  - Out of bed for toileting  - Record patient progress and toleration of activity level   Outcome: Progressing

## 2024-10-08 NOTE — ASSESSMENT & PLAN NOTE
Resolved  Presented to the emergency department following mechanical fall and subsequent hip pain  Accelerated blood pressures on admission, SBP in the 200s  S/p IV labetalol x 1  CT head negative for acute intracranial abnormalities  Home regimen includes Toprol XL 50 mg twice daily, Procardia 60 mg twice daily -continue this antihypertensive regimen  May also be driven by poor pain control  This has stabilized.

## 2024-10-08 NOTE — ASSESSMENT & PLAN NOTE
Tachycardia resolved and leukocytosis nearly resolved without abx  Patient met SIRS criteria with tachycardia, leukocytosis in setting of vomiting  CT abdomen/pelvis without acute findings with exception of hypodensities on prostate  UA bland  No e/o prostatitis  Trend WBC and fever curve  Monitored off antibiotics, low suspicion for infectious process

## 2024-10-08 NOTE — ASSESSMENT & PLAN NOTE
Elevated troponin 135-->129-->143-->135  EKG without ischemic changes  Troponins peaked  Suspect in setting of elevated blood pressures

## 2024-10-08 NOTE — CASE MANAGEMENT
Case Management Discharge Planning Note    Patient name Juan San  Location /-01 MRN [id number]  : 1955 Date 10/8/2024       Current Admission Date: 10/6/2024  Current Admission Diagnosis:Nausea and vomiting   Patient Active Problem List    Diagnosis Date Noted Date Diagnosed    Elevated troponin I level 10/06/2024     Ambulatory dysfunction 10/06/2024     SIRS (systemic inflammatory response syndrome) ([facility]) 10/06/2024     Hypokalemia 10/06/2024     Metabolic acidosis, increased anion gap 10/06/2024     Prostatitis 2024     Lower extremity edema 2024     Venous stasis ulcers ([facility]) 2024     LIGIA (acute kidney injury) ([facility]) 2024     Failure of joint fusion ([facility]) 2024     Lactic acidosis 2024     Type 2 diabetes mellitus with hyperglycemia, without long-term current use of insulin ([facility]) 2024     Foraminal stenosis of lumbar region 2024     Discitis of lumbosacral region 2024     Hypochromic microcytic anemia 10/10/2023     Acute on chronic bilateral low back pain with sciatica 10/09/2023     Anxiety and depression 10/09/2023     Hypertension 10/09/2023     Benign prostatic hyperplasia with urinary frequency 2023     Scrotal pain 2023     Lower urinary tract symptoms 2023     Status post lumbar spinal fusion 2023     Hyponatremia 2023     Gallstones 2023     Anemia 2023     Urinary retention 2023     Nausea and vomiting      Medical marijuana use      Chronic bilateral low back pain without sciatica 2023     Lumbar radiculopathy      Chronic pain syndrome 2022     Liver disease 08/10/2022     Hypertensive urgency 2022     Bronchitis, mucopurulent recurrent (HCC) 2022     Cirrhosis, alcoholic (HCC) 2022     Diabetic peripheral neuropathy (HCC) 2022     Herniated nucleus pulposus of lumbosacral region 2022     Thyroid cancer (HCC) 2021      Alcoholism in remission (HCC) 07/30/2021     Benzodiazepine dependence (HCC) 07/30/2021     Bilateral adhesive capsulitis of shoulders 07/30/2021     DM (diabetes mellitus) (HCC) 07/30/2021     Hypercholesterolemia 07/30/2021     Lumbar spondylosis 07/30/2021       LOS (days): 1  Geometric Mean LOS (GMLOS) (days): 2.9  Days to GMLOS:1.8     OBJECTIVE:  Risk of Unplanned Readmission Score: 55.97         Current admission status: Inpatient   Preferred Pharmacy:   [facility] [address] [address]  Phone: [phone] Fax: [phone]    Primary Care Provider: Sabra Magaña DO    Primary Insurance: MEDICARE  Secondary Insurance: [facility]    DISCHARGE DETAILS:                         Notification made to OP CM Handoff: [facility] OP CM regarding discharge planning and disposition.

## 2024-10-08 NOTE — ASSESSMENT & PLAN NOTE
Anion gap noted to be 19 on admission, resolved   Lactic acid wnl  BHB elevated   Suspect related to recent vomiting/starvation,  S/p 1 L IVF in the ED and maintenance IVF  Resolved on repeat labs

## 2024-10-08 NOTE — ASSESSMENT & PLAN NOTE
Reports recurrent episodes intermittently at home, he feels related to excessive post nasal drip but he also admits to frequent use ( medical MJ for back pain)  Suspect this may be cyclic vomiting syndrome  CTH negative but does show mucus retention cysts in maxillary sinuses, reports he has been on Zyrtec with little relief  CT abdomen/pelvis unremarkable  Continue supportive care with IVF, antiemetics  Advanced to regular diet and tolerated that well   May benefit from ENT referral on discharge for treatment/management of post nasal drip and sinus cysts  Discussed with pt and wife that if these episodes continue to recur, may consider stopping MJ use as it could be triggering cyclic vomiting. Unfortunately the marijuana helps with this pain.

## 2024-10-08 NOTE — PLAN OF CARE
Problem: Potential for Falls  Goal: Patient will remain free of falls  Description: INTERVENTIONS:  - Educate patient/family on patient safety including physical limitations  - Instruct patient to call for assistance with activity   - Consult OT/PT to assist with strengthening/mobility   - Keep Call bell within reach  - Keep bed low and locked with side rails adjusted as appropriate  - Keep care items and personal belongings within reach  - Initiate and maintain comfort rounds  - Make Fall Risk Sign visible to staff  - Offer Toileting every 2 Hours, in advance of need  - Initiate/Maintain bed/chair alarm  - Obtain necessary fall risk management equipment:   - Apply yellow socks and bracelet for high fall risk patients  - Consider moving patient to room near nurses station  Outcome: Progressing     Problem: SAFETY ADULT  Goal: Maintain or return to baseline ADL function  Description: INTERVENTIONS:  -  Assess patient's ability to carry out ADLs; assess patient's baseline for ADL function and identify physical deficits which impact ability to perform ADLs (bathing, care of mouth/teeth, toileting, grooming, dressing, etc.)  - Assess/evaluate cause of self-care deficits   - Assess range of motion  - Assess patient's mobility; develop plan if impaired  - Assess patient's need for assistive devices and provide as appropriate  - Encourage maximum independence but intervene and supervise when necessary  - Involve family in performance of ADLs  - Assess for home care needs following discharge   - Consider OT consult to assist with ADL evaluation and planning for discharge  - Provide patient education as appropriate  Outcome: Progressing

## 2024-10-08 NOTE — ASSESSMENT & PLAN NOTE
Home regimen includes tramadol 50 mg every 8 hours as needed  Last filled 10/3/2024 per PDMP  Difficulty with acute on chronic pain control presently following fall, has required oxycodone for severe pain however none of his outpatient providers prescribe this medication for him   Encouraged to follow up with pain management on discharge

## 2024-10-08 NOTE — ASSESSMENT & PLAN NOTE
Lab Results   Component Value Date    HGBA1C 5.9 (H) 10/06/2024       Recent Labs     10/07/24  1555 10/07/24  2112 10/08/24  0734 10/08/24  1157   POCGLU 128 179* 135 206*       Blood Sugar Average: Last 72 hrs:  (P) 149.875Held  metformin and Jardiance while inpatient, can resume at discharge   SSI plus Accu-Chek  Updated A1c 5.9

## 2024-10-08 NOTE — CASE MANAGEMENT
Case Management Discharge Planning Note    Patient name Juan San  Location /-01 MRN [id number]  : 1955 Date 10/8/2024       Current Admission Date: 10/6/2024  Current Admission Diagnosis:Nausea and vomiting   Patient Active Problem List    Diagnosis Date Noted Date Diagnosed    Elevated troponin I level 10/06/2024     Ambulatory dysfunction 10/06/2024     SIRS (systemic inflammatory response syndrome) ([facility]) 10/06/2024     Hypokalemia 10/06/2024     Metabolic acidosis, increased anion gap 10/06/2024     Prostatitis 2024     Lower extremity edema 2024     Venous stasis ulcers ([facility]) 2024     LIGIA (acute kidney injury) ([facility]) 2024     Failure of joint fusion ([facility]) 2024     Lactic acidosis 2024     Type 2 diabetes mellitus with hyperglycemia, without long-term current use of insulin ([facility]) 2024     Foraminal stenosis of lumbar region 2024     Discitis of lumbosacral region 2024     Hypochromic microcytic anemia 10/10/2023     Acute on chronic bilateral low back pain with sciatica 10/09/2023     Anxiety and depression 10/09/2023     Hypertension 10/09/2023     Benign prostatic hyperplasia with urinary frequency 2023     Scrotal pain 2023     Lower urinary tract symptoms 2023     Status post lumbar spinal fusion 2023     Hyponatremia 2023     Gallstones 2023     Anemia 2023     Urinary retention 2023     Nausea and vomiting      Medical marijuana use      Chronic bilateral low back pain without sciatica 2023     Lumbar radiculopathy      Chronic pain syndrome 2022     Liver disease 08/10/2022     Hypertensive urgency 2022     Bronchitis, mucopurulent recurrent (HCC) 2022     Cirrhosis, alcoholic (HCC) 2022     Diabetic peripheral neuropathy (HCC) 2022     Herniated nucleus pulposus of lumbosacral region 2022     Thyroid cancer (HCC) 2021      Alcoholism in remission (HCC) 07/30/2021     Benzodiazepine dependence (HCC) 07/30/2021     Bilateral adhesive capsulitis of shoulders 07/30/2021     DM (diabetes mellitus) (HCC) 07/30/2021     Hypercholesterolemia 07/30/2021     Lumbar spondylosis 07/30/2021       LOS (days): 1  Geometric Mean LOS (GMLOS) (days): 2.9  Days to GMLOS:1.8     OBJECTIVE:  Risk of Unplanned Readmission Score: 55.97         Current admission status: Inpatient   Preferred Pharmacy:   [facility] [address] [address]  Phone: [phone] Fax: [phone]    Primary Care Provider: Sabra Magaña DO    Primary Insurance: MEDICARE  Secondary Insurance: [facility]    DISCHARGE DETAILS:                                                                                        IMM Given (Date):: 10/08/24  IMM Given to:: Patient  Family notified:: Tatyana, spouse.     IMM reviewed with patient and caregiver, patient and caregiver agrees with discharge determination.

## 2024-10-09 LAB
ATRIAL RATE: 97 BPM
PR INTERVAL: 114 MS
QRS AXIS: 76 DEGREES
QRSD INTERVAL: 76 MS
QT INTERVAL: 370 MS
QTC INTERVAL: 469 MS
T WAVE AXIS: 69 DEGREES
VENTRICULAR RATE: 97 BPM

## 2024-10-09 PROCEDURE — 93010 ELECTROCARDIOGRAM REPORT: CPT | Performed by: INTERNAL MEDICINE

## 2024-10-09 RX ORDER — TRAMADOL HYDROCHLORIDE 50 MG/1
50 TABLET ORAL EVERY 6 HOURS PRN
Qty: 10 TABLET | Refills: 0 | Status: SHIPPED | OUTPATIENT
Start: 2024-10-09 | End: 2024-10-09

## 2024-10-09 RX ORDER — TRAMADOL HYDROCHLORIDE 50 MG/1
50 TABLET ORAL EVERY 6 HOURS PRN
Qty: 10 TABLET | Refills: 0 | Status: SHIPPED | OUTPATIENT
Start: 2024-10-09 | End: 2024-10-19

## 2024-10-10 ENCOUNTER — HOME CARE VISIT (OUTPATIENT)
Dept: HOME HEALTH SERVICES | Facility: HOME HEALTHCARE | Age: 69
End: 2024-10-10
Payer: COMMERCIAL

## 2024-10-10 PROCEDURE — G0151 HHCP-SERV OF PT,EA 15 MIN: HCPCS

## 2024-10-10 PROCEDURE — 400013 VN SOC

## 2024-10-11 ENCOUNTER — HOME CARE VISIT (OUTPATIENT)
Dept: HOME HEALTH SERVICES | Facility: HOME HEALTHCARE | Age: 69
End: 2024-10-11
Payer: MEDICARE

## 2024-10-11 VITALS — SYSTOLIC BLOOD PRESSURE: 130 MMHG | DIASTOLIC BLOOD PRESSURE: 70 MMHG | OXYGEN SATURATION: 96 % | HEART RATE: 72 BPM

## 2024-10-11 PROCEDURE — G0321 AUDIO-ONLY HHS: HCPCS

## 2024-10-12 ENCOUNTER — HOME CARE VISIT (OUTPATIENT)
Dept: HOME HEALTH SERVICES | Facility: HOME HEALTHCARE | Age: 69
End: 2024-10-12
Payer: MEDICARE

## 2024-10-12 VITALS
TEMPERATURE: 97.8 F | RESPIRATION RATE: 20 BRPM | DIASTOLIC BLOOD PRESSURE: 80 MMHG | OXYGEN SATURATION: 97 % | HEART RATE: 78 BPM | SYSTOLIC BLOOD PRESSURE: 142 MMHG

## 2024-10-12 PROCEDURE — G0299 HHS/HOSPICE OF RN EA 15 MIN: HCPCS

## 2024-10-13 ENCOUNTER — HOME CARE VISIT (OUTPATIENT)
Dept: HOME HEALTH SERVICES | Facility: HOME HEALTHCARE | Age: 69
End: 2024-10-13
Payer: COMMERCIAL

## 2024-10-13 VITALS
OXYGEN SATURATION: 99 % | TEMPERATURE: 97.6 F | DIASTOLIC BLOOD PRESSURE: 70 MMHG | SYSTOLIC BLOOD PRESSURE: 122 MMHG | HEART RATE: 70 BPM | RESPIRATION RATE: 20 BRPM

## 2024-10-13 PROCEDURE — G0299 HHS/HOSPICE OF RN EA 15 MIN: HCPCS

## 2024-10-14 ENCOUNTER — HOME CARE VISIT (OUTPATIENT)
Dept: HOME HEALTH SERVICES | Facility: HOME HEALTHCARE | Age: 69
End: 2024-10-14
Payer: COMMERCIAL

## 2024-10-14 VITALS
TEMPERATURE: 97.8 F | SYSTOLIC BLOOD PRESSURE: 120 MMHG | DIASTOLIC BLOOD PRESSURE: 60 MMHG | OXYGEN SATURATION: 98 % | HEART RATE: 72 BPM | RESPIRATION RATE: 20 BRPM

## 2024-10-14 PROCEDURE — 10330064 DRESSING, HYDROCELLULAR ADH STR FOAM 4X"

## 2024-10-14 PROCEDURE — G0299 HHS/HOSPICE OF RN EA 15 MIN: HCPCS

## 2024-10-14 PROCEDURE — 10330064 CLEANSER, WND SEA-CLEANS 6OZ  COLPLT

## 2024-10-14 PROCEDURE — 10330064 BANDAGE, CNFRM 4X4.1YDS N/S LF (12RL/BG"

## 2024-10-14 PROCEDURE — 10330064 DRESSING, CALCIUM ALGINATE AG SHEET 4X4"

## 2024-10-15 ENCOUNTER — HOME CARE VISIT (OUTPATIENT)
Dept: HOME HEALTH SERVICES | Facility: HOME HEALTHCARE | Age: 69
End: 2024-10-15
Payer: COMMERCIAL

## 2024-10-15 VITALS — OXYGEN SATURATION: 98 % | DIASTOLIC BLOOD PRESSURE: 70 MMHG | HEART RATE: 67 BPM | SYSTOLIC BLOOD PRESSURE: 136 MMHG

## 2024-10-15 VITALS
DIASTOLIC BLOOD PRESSURE: 64 MMHG | RESPIRATION RATE: 20 BRPM | SYSTOLIC BLOOD PRESSURE: 130 MMHG | TEMPERATURE: 98.2 F | HEART RATE: 76 BPM | OXYGEN SATURATION: 98 %

## 2024-10-15 PROCEDURE — 10330064 PAD, ABD 5X9 STR LF (1/PK 20PK/BX) MGM1"

## 2024-10-15 PROCEDURE — 10330064 DRESSING, HYDROGEL 4X4 (15/BX 4BX/CS)

## 2024-10-15 PROCEDURE — 10330064 FOAM, ADH SIL W/BORDER SACRAL 7X7" (10/"

## 2024-10-15 PROCEDURE — 10330064 DRESSING, HYDROCELLULAR FM N/ADH W/FILM

## 2024-10-15 PROCEDURE — G0299 HHS/HOSPICE OF RN EA 15 MIN: HCPCS

## 2024-10-15 PROCEDURE — 10330064 DRESSING, CALCIUM ALGINATE AG SHEET 4X4"

## 2024-10-15 PROCEDURE — 10330064 BANDAGE, CNFRM 4X4.1YDS N/S LF (12RL/BG"

## 2024-10-15 PROCEDURE — 10330064 CLEANSER, WND SEA-CLEANS 6OZ  COLPLT

## 2024-10-15 PROCEDURE — 10330064 TAPE, ADHSV TRANSPORE WHT 2 (6RL/BX 10B"

## 2024-10-15 PROCEDURE — G0157 HHC PT ASSISTANT EA 15: HCPCS

## 2024-10-15 PROCEDURE — G0152 HHCP-SERV OF OT,EA 15 MIN: HCPCS | Performed by: OCCUPATIONAL THERAPIST

## 2024-10-16 ENCOUNTER — HOME CARE VISIT (OUTPATIENT)
Dept: HOME HEALTH SERVICES | Facility: HOME HEALTHCARE | Age: 69
End: 2024-10-16
Payer: COMMERCIAL

## 2024-10-16 VITALS
TEMPERATURE: 95.6 F | RESPIRATION RATE: 16 BRPM | OXYGEN SATURATION: 96 % | DIASTOLIC BLOOD PRESSURE: 60 MMHG | HEART RATE: 76 BPM | SYSTOLIC BLOOD PRESSURE: 123 MMHG

## 2024-10-16 PROCEDURE — G0155 HHCP-SVS OF CSW,EA 15 MIN: HCPCS

## 2024-10-17 ENCOUNTER — HOME CARE VISIT (OUTPATIENT)
Dept: HOME HEALTH SERVICES | Facility: HOME HEALTHCARE | Age: 69
End: 2024-10-17
Payer: COMMERCIAL

## 2024-10-17 PROCEDURE — G0180 MD CERTIFICATION HHA PATIENT: HCPCS | Performed by: INTERNAL MEDICINE

## 2024-10-17 NOTE — CASE COMMUNICATION
"Phoned patient at request of MSW to discuss possible OT re-eval.  Patient reported he showers and dresses himself with wife present for safety. Due to back pain, spouse currently prepares all meals and completes IADLs. Patient stated his primary problem is \"I can't straighten up. My legs feel tired and achy and I have so much pain. I'd like to get my legs stronger to move better.\" Discussed PT will focus on these things. [facility] and Sloan sanabria, please refer OT again if patient's pain subsides and he is able and willing to work toward ADL goals. Patient reports he perfers female providers, but is ok with [facility] continuing to provide PT."

## 2024-10-21 ENCOUNTER — HOME CARE VISIT (OUTPATIENT)
Dept: HOME HEALTH SERVICES | Facility: HOME HEALTHCARE | Age: 69
End: 2024-10-21
Payer: COMMERCIAL

## 2024-10-22 ENCOUNTER — HOME CARE VISIT (OUTPATIENT)
Dept: HOME HEALTH SERVICES | Facility: HOME HEALTHCARE | Age: 69
End: 2024-10-22
Payer: COMMERCIAL

## 2024-10-22 VITALS
TEMPERATURE: 96.5 F | SYSTOLIC BLOOD PRESSURE: 121 MMHG | RESPIRATION RATE: 15 BRPM | DIASTOLIC BLOOD PRESSURE: 80 MMHG | OXYGEN SATURATION: 97 % | HEART RATE: 76 BPM

## 2024-10-22 VITALS
RESPIRATION RATE: 20 BRPM | OXYGEN SATURATION: 97 % | HEART RATE: 78 BPM | SYSTOLIC BLOOD PRESSURE: 140 MMHG | TEMPERATURE: 98.1 F

## 2024-10-22 PROCEDURE — G0157 HHC PT ASSISTANT EA 15: HCPCS

## 2024-10-22 PROCEDURE — G0299 HHS/HOSPICE OF RN EA 15 MIN: HCPCS

## 2024-10-24 ENCOUNTER — OFFICE VISIT (OUTPATIENT)
Dept: WOUND CARE | Facility: HOSPITAL | Age: 69
End: 2024-10-24
Payer: COMMERCIAL

## 2024-10-24 VITALS
TEMPERATURE: 96.5 F | HEIGHT: 68 IN | HEART RATE: 68 BPM | RESPIRATION RATE: 17 BRPM | DIASTOLIC BLOOD PRESSURE: 74 MMHG | WEIGHT: 158 LBS | BODY MASS INDEX: 23.95 KG/M2 | SYSTOLIC BLOOD PRESSURE: 138 MMHG

## 2024-10-24 DIAGNOSIS — R26.2 AMBULATORY DYSFUNCTION: ICD-10-CM

## 2024-10-24 DIAGNOSIS — S31.829A WOUND OF BUTTOCK, LEFT, INITIAL ENCOUNTER: ICD-10-CM

## 2024-10-24 DIAGNOSIS — E11.621 DIABETIC ULCER OF TOE OF LEFT FOOT ASSOCIATED WITH TYPE 2 DIABETES MELLITUS, WITH FAT LAYER EXPOSED (HCC): Primary | ICD-10-CM

## 2024-10-24 DIAGNOSIS — L97.522 DIABETIC ULCER OF TOE OF LEFT FOOT ASSOCIATED WITH TYPE 2 DIABETES MELLITUS, WITH FAT LAYER EXPOSED (HCC): Primary | ICD-10-CM

## 2024-10-24 DIAGNOSIS — M54.50 CHRONIC MIDLINE LOW BACK PAIN, UNSPECIFIED WHETHER SCIATICA PRESENT: ICD-10-CM

## 2024-10-24 DIAGNOSIS — E11.42 DIABETIC PERIPHERAL NEUROPATHY (HCC): ICD-10-CM

## 2024-10-24 DIAGNOSIS — G89.29 CHRONIC MIDLINE LOW BACK PAIN, UNSPECIFIED WHETHER SCIATICA PRESENT: ICD-10-CM

## 2024-10-24 PROCEDURE — 97597 DBRDMT OPN WND 1ST 20 CM/<: CPT | Performed by: FAMILY MEDICINE

## 2024-10-24 PROCEDURE — 99213 OFFICE O/P EST LOW 20 MIN: CPT | Performed by: FAMILY MEDICINE

## 2024-10-24 PROCEDURE — 99204 OFFICE O/P NEW MOD 45 MIN: CPT | Performed by: FAMILY MEDICINE

## 2024-10-24 RX ORDER — LIDOCAINE HYDROCHLORIDE 40 MG/ML
5 SOLUTION TOPICAL ONCE
Status: COMPLETED | OUTPATIENT
Start: 2024-10-24 | End: 2024-10-24

## 2024-10-24 RX ADMIN — LIDOCAINE HYDROCHLORIDE 5 ML: 40 SOLUTION TOPICAL at 08:51

## 2024-10-24 NOTE — PROGRESS NOTES
Wound Procedure Treatment Traumatic Left;Lower Back    Performed by: Naty Burns RN  Authorized by: Rodney Stroud MD    Associated wounds:   Wound 10/07/24 Traumatic Other (Comment) Back Left;Lower  Wound cleansed with:  NSS  Applied primary dressing:  Hydrocolloid

## 2024-10-24 NOTE — PROGRESS NOTES
Patient ID: Juan San is a 69 y.o. male Date of Birth 1955       Chief Complaint   Patient presents with    New Patient Visit     Patient presents today with multiple wounds to left foot and buttock. Wife reports wounds have been present for 2-3 weeks.       Allergies:  Abilify [aripiprazole], Cephalexin, Molds & smuts, and Pregabalin    Diagnosis:      Diagnosis ICD-10-CM Associated Orders   1. Diabetic ulcer of toe of left foot associated with type 2 diabetes mellitus, with fat layer exposed ([facility])  E11.621 lidocaine (XYLOCAINE) 4 % topical solution 5 mL    L97.522 Wound Procedure Treatment Diabetic Ulcer Anterior;Left Toe D1, great     Wound cleansing and dressings Diabetic Ulcer Anterior;Left Toe D1, great     Debridement      2. Wound of buttock, left, initial encounter  S31.829A Wound Procedure Treatment Traumatic Left;Lower Back     Wound cleansing and dressings Traumatic Left;Lower Back     Debridement      3. Chronic midline low back pain, unspecified whether sciatica present  M54.50     G89.29       4. Diabetic peripheral neuropathy ([facility])  E11.42       5. Ambulatory dysfunction  R26.2               Assessment & Plan:  DFU 1 of the base of the left hallux.   Selective débridement.  Dermagran every other day.   A1C results reviewed with the patient today.  Good control with last A1c of 5.9.  Injury to the left buttock.  Full-thickness.  No signs of infection.  Selective debridement.  Hydrocolloid every 4 days and as needed displacement.  Discussed offloading.  Chronic low back pain status post multiple surgeries.  Waiting for more surgery after wound is closed.  History of falls.           Subjective:   10/24/2024: First visit for this 69-year-old male comes to the wound center because of wound of the left buttock and left hallux.  Patient states that about 3 weeks ago he was using an IcyHot and fell asleep while laying on his left buttock.  Developed a wound.  VNA has been coming to the house and  using Dermagran.  Very slow progress.  About the same time, he developed a large blister at the base of the left hallux.  This subsequently ruptured and was left with an ulceration.  Patient has a history of venous stasis.  He did have some more swelling of his legs.  VNA has been using Dermagran and this is well.  Slow improvement to both.  Patient has type 2 diabetes, with peripheral neuropathy.  In addition, he has chronic low back pain status post multiple surgeries and is waiting for more surgery after his wounds heal.  He has ambulatory dysfunction with a history of falls.  He does use a walker or wheelchair.        The following portions of the patient's history were reviewed and updated as appropriate:   Patient Active Problem List   Diagnosis    Alcoholism in remission (HCC)    Benzodiazepine dependence (HCC)    Bilateral adhesive capsulitis of shoulders    Bronchitis, mucopurulent recurrent (HCC)    Cirrhosis, alcoholic (HCC)    Diabetic peripheral neuropathy (HCC)    DM (diabetes mellitus) (HCC)    Herniated nucleus pulposus of lumbosacral region    Hypercholesterolemia    Lumbar spondylosis    Thyroid cancer (HCC)    Hypertensive urgency    Liver disease    Chronic pain syndrome    Lumbar radiculopathy    Chronic bilateral low back pain without sciatica    Nausea and vomiting    Medical marijuana use    Urinary retention    Anemia    Hyponatremia    Gallstones    Status post lumbar spinal fusion    Benign prostatic hyperplasia with urinary frequency    Scrotal pain    Lower urinary tract symptoms    Acute on chronic bilateral low back pain with sciatica    Anxiety and depression    Hypertension    Hypochromic microcytic anemia    Discitis of lumbosacral region    Foraminal stenosis of lumbar region    Lactic acidosis    Type 2 diabetes mellitus with hyperglycemia, without long-term current use of insulin (HCC)    Failure of joint fusion (HCC)    Prostatitis    Lower extremity edema    Venous stasis  ulcers (HCC)    LIGIA (acute kidney injury) (HCC)    Elevated troponin I level    Ambulatory dysfunction    SIRS (systemic inflammatory response syndrome) (HCC)    Hypokalemia    Metabolic acidosis, increased anion gap     Past Medical History:   Diagnosis Date    Anxiety     Arthritis     Cancer (HCC)     Chronic back pain     Depression     Diabetes mellitus (HCC)     Hypertension     Liver disease     Mitral valve prolapse     Peripheral neuropathy     Neuropathy    PONV (postoperative nausea and vomiting)     Thyroid disease      Past Surgical History:   Procedure Laterality Date    COLONOSCOPY      INCISION AND DRAINAGE OF WOUND Left 2022    Procedure: INCISION AND DRAINAGE (I&D) EXTREMITY;  Surgeon: Moustapha Cote DPM;  Location: [facility]IN OR;  Service: Podiatry    IR BIOPSY SPINE  2024    IR BIOPSY SPINE  2024    IR PICC PLACEMENT SINGLE LUMEN  2024    JOINT REPLACEMENT Left 2022    Left TSA    KY ARTHRODESIS POSTERIOR/PSTLAT TQ 1NTRSPC LUMBAR Bilateral 2023    Procedure: L1-S1 navigated posterior decompression with instrumented fixation fusion;  Surgeon: James Moraes MD;  Location:  MAIN OR;  Service: Neurosurgery    THYROID SURGERY      remove cancer     Family History   Problem Relation Age of Onset    No Known Problems Father     No Known Problems Mother     Colon cancer Neg Hx       Social History     Socioeconomic History    Marital status: /Civil Union     Spouse name: Not on file    Number of children: Not on file    Years of education: Not on file    Highest education level: Not on file   Occupational History    Not on file   Tobacco Use    Smoking status: Former     Current packs/day: 0.00     Average packs/day: 0.3 packs/day for 5.9 years (1.5 ttl pk-yrs)     Types: Cigarettes     Start date: 1975     Quit date: 1981     Years since quittin.4    Smokeless tobacco: Never    Tobacco comments:     quit ; smokes when in pain as of 26    Vaping Use    Vaping status: Former    Substances: THC, CBD   Substance and Sexual Activity    Alcohol use: Never    Drug use: Yes     Frequency: 7.0 times per week     Types: Marijuana     Comment: Medical Marijuana, takes for pain, daily, vape    Sexual activity: Yes     Partners: Female     Birth control/protection: None   Other Topics Concern    Not on file   Social History Narrative    Not on file     Social Determinants of Health     Financial Resource Strain: Not on file   Food Insecurity: No Food Insecurity (10/8/2024)    Nursing - Inadequate Food Risk Classification     Worried About Running Out of Food in the Last Year: Never true     Ran Out of Food in the Last Year: Never true     Ran Out of Food in the Last Year: Not on file   Transportation Needs: No Transportation Needs (10/10/2024)    OASIS : Transportation     Lack of Transportation (Medical): No     Lack of Transportation (Non-Medical): No     Patient Unable or Declines to Respond: No   Physical Activity: Not on file   Stress: Not on file   Social Connections: Not on file   Intimate Partner Violence: Not on file   Housing Stability: Low Risk  (10/8/2024)    Housing Stability Vital Sign     Unable to Pay for Housing in the Last Year: No     Number of Times Moved in the Last Year: 0     Homeless in the Last Year: No        Current Outpatient Medications:     ACCU-CHEK FABIANO PLUS test strip, 4 (four) times a day, Disp: , Rfl: 1    ACCU-CHEK FASTCLIX LANCETS MISC, 4 (four) times a day, Disp: , Rfl: 1    acetaminophen (TYLENOL) 500 mg tablet, Take 2 tablets (1,000 mg total) by mouth every 8 (eight) hours 500 mg tablet, Disp: , Rfl:     clonazePAM (KlonoPIN) 1 mg tablet, Take 1 mg by mouth 2 (two) times a day & 3rd pill PRN for anxiety , Disp: , Rfl:     doxycycline hyclate (VIBRAMYCIN) 100 mg capsule, Take 100 mg by mouth 2 (two) times a day Do not start before August 10, 2024., Disp: , Rfl:     escitalopram (Lexapro) 20 mg tablet, Take 20 mg by  mouth daily. Indications: depression, Disp: , Rfl:     finasteride (PROSCAR) 5 mg tablet, TAKE 1 TABLET BY MOUTH DAILY, Disp: 100 tablet, Rfl: 1    folic acid (FOLVITE) 1 mg tablet, Take 2,000 mcg by mouth daily, Disp: , Rfl: 6    furosemide (LASIX) 20 mg tablet, Take 1 tablet (20 mg total) by mouth daily as needed (for weight gain more than 3 lbs overnight or more than 5 lbs in a week, or lower extremity swelling), Disp: 30 tablet, Rfl: 0    furosemide (LASIX) 20 mg tablet, Take 20 mg by mouth. take 1 tab by mouth daily as needed for weight gain more than 3lbs overnight or more than 5lb in a week, or LE swelling., Disp: , Rfl:     gabapentin (NEURONTIN) 100 mg capsule, Take 1 capsule (100 mg total) by mouth daily, Disp: 30 capsule, Rfl: 0    hydrOXYzine pamoate (VISTARIL) 50 mg capsule, Take 50 mg by mouth 2 (two) times a day. Indications: Feeling Anxious, Disp: , Rfl:     Jardiance 10 MG TABS, Take 10 mg by mouth every morning, Disp: , Rfl:     lidocaine (LIDODERM) 5 %, Apply 1 patch topically over 12 hours daily Remove & Discard patch within 12 hours or as directed by MD (Patient taking differently: Apply 1 patch topically daily. Remove & Discard patch within 12 hours or as directed by MD  applied to back for back pain ), Disp: 15 patch, Rfl: 0    lovastatin (MEVACOR) 20 mg tablet, Take 20 mg by mouth every morning, Disp: , Rfl: 3    metFORMIN (GLUCOPHAGE) 1000 MG tablet, Take 1,000 mg by mouth 2 (two) times a day with meals , Disp: , Rfl:     methocarbamol (ROBAXIN) 750 mg tablet, Take 1 tablet (750 mg total) by mouth every 6 (six) hours as needed for muscle spasms, Disp: , Rfl:     metoprolol succinate (TOPROL-XL) 50 mg 24 hr tablet, Take 50 mg by mouth 2 (two) times a day, Disp: , Rfl:     mirtazapine (REMERON) 45 MG tablet, Take 45 mg by mouth daily at bedtime, Disp: , Rfl: 1    NIFEdipine (PROCARDIA XL) 30 mg 24 hr tablet, Take 2 tablets (60 mg total) by mouth 2 (two) times a day, Disp: 120 tablet, Rfl: 0     "ondansetron (Zofran ODT) 4 mg disintegrating tablet, Take 1 tablet (4 mg total) by mouth every 6 (six) hours as needed for nausea or vomiting, Disp: 30 tablet, Rfl: 0    patient supplied medication, medical marijuana vape as needed per latest dci  Indications: ., Disp: , Rfl:     senna (SENOKOT) 8.6 mg, Take 1 tablet (8.6 mg total) by mouth 2 (two) times a day 2 tabs at bedtime as needed for constipation per latest dci, Disp: , Rfl:     tamsulosin (FLOMAX) 0.4 mg, TAKE 1 CAPSULE BY MOUTH DAILY WITH DINNER, Disp: 90 capsule, Rfl: 1  No current facility-administered medications for this visit.    Review of Systems   Constitutional:  Negative for appetite change, chills, fatigue, fever and unexpected weight change.   HENT:  Negative for congestion, hearing loss and postnasal drip.    Respiratory:  Negative for cough and shortness of breath.    Cardiovascular:  Positive for leg swelling.   Genitourinary:  Negative for urgency.   Musculoskeletal:  Positive for gait problem (Walker or wheelchair).   Skin:  Positive for wound (Right hallux and left buttock). Negative for rash.   Neurological:  Positive for numbness (Both legs).   Hematological:  Does not bruise/bleed easily.   Psychiatric/Behavioral:  Positive for dysphoric mood. The patient is not nervous/anxious.        Objective:  /74   Pulse 68   Temp (!) 96.5 °F (35.8 °C)   Resp 17   Ht 5' 8\" (1.727 m)   Wt 71.7 kg (158 lb)   BMI 24.02 kg/m²   Pain Score: 0-No pain     Physical Exam  Vitals and nursing note reviewed.   Constitutional:       Appearance: Normal appearance. He is well-developed and normal weight.   HENT:      Head: Normocephalic and atraumatic.   Cardiovascular:      Rate and Rhythm: Normal rate.      Pulses:           Dorsalis pedis pulses are 1+ on the left side.   Pulmonary:      Effort: Pulmonary effort is normal.   Musculoskeletal:      Left foot: Deformity present.        Feet:    Feet:      Left foot:      Protective Sensation: 3 sites " tested.  0 sites sensed.      Skin integrity: Ulcer present.      Comments: DFU 1, no signs of infection.  Some fibrin.  Few areas with slough.  Skin:     General: Skin is warm and dry.      Findings: Wound present. No erythema.             Comments: Ulceration, full thickness of the left buttock.  No signs of infection.  Some fibrin noted.  No malodor.   Neurological:      Mental Status: He is alert and oriented to person, place, and time.   Psychiatric:         Attention and Perception: Attention normal.         Mood and Affect: Mood and affect normal.         Behavior: Behavior is cooperative.         Cognition and Memory: Cognition normal.               Wound 10/07/24 Traumatic Other (Comment) Back Left;Lower (Active)   Wound Image Images linked 10/24/24 0829   Wound Description Pink;Yellow 10/24/24 0829   Emely-wound Assessment Pink 10/24/24 0829   Wound Length (cm) 0.9 cm 10/24/24 0829   Wound Width (cm) 1.7 cm 10/24/24 0829   Wound Depth (cm) 0.1 cm 10/24/24 0829   Wound Surface Area (cm^2) 1.53 cm^2 10/24/24 0829   Wound Volume (cm^3) 0.153 cm^3 10/24/24 0829   Calculated Wound Volume (cm^3) 0.15 cm^3 10/24/24 0829   Change in Wound Size % 97.22 10/24/24 0829   Drainage Amount Moderate 10/24/24 0829   Drainage Description Serosanguineous 10/24/24 0829   Non-staged Wound Description Full thickness 10/24/24 0829   Dressing Status Intact 10/24/24 0829       Wound 10/13/24 Diabetic Ulcer Toe D1, great Anterior;Left (Active)   Wound Image Images linked 10/24/24 0827   Wound Description Epithelialization;Yellow;Pink 10/24/24 0827   Emely-wound Assessment Scaly 10/24/24 0827   Wound Length (cm) 3.6 cm 10/24/24 0827   Wound Width (cm) 2.5 cm 10/24/24 0827   Wound Depth (cm) 0.1 cm 10/24/24 0827   Wound Surface Area (cm^2) 9 cm^2 10/24/24 0827   Wound Volume (cm^3) 0.9 cm^3 10/24/24 0827   Calculated Wound Volume (cm^3) 0.9 cm^3 10/24/24 0827   Change in Wound Size % -8900 10/24/24 0827   Drainage Amount Moderate  "10/24/24 0827   Drainage Description Serosanguineous 10/24/24 0827   Non-staged Wound Description Full thickness 10/24/24 0827   Dressing Status Intact 10/24/24 0827        Debridement   Wound 10/07/24 Traumatic Other (Comment) Back Left;Lower    Universal Protocol:  procedure performed by consultantConsent: Verbal consent obtained. Written consent obtained.  Consent given by: patient  Time out: Immediately prior to procedure a \"time out\" was called to verify the correct patient, procedure, equipment, support staff and site/side marked as required.  Patient understanding: patient states understanding of the procedure being performed  Patient identity confirmed: verbally with patient    Debridement Details  Performed by: physician  Debridement type: selective  Pain control: lidocaine 4%      Post-debridement measurements  Length (cm): 0.9  Width (cm): 1.7  Depth (cm): 0.1  Percent debrided: 100%  Surface Area (cm^2): 1.53  Area Debrided (cm^2): 1.53  Volume (cm^3): 0.15    Devitalized tissue debrided: fibrin  Instrument(s) utilized: curette  Bleeding: small  Hemostasis obtained with: pressure  Procedural pain (0-10): 2  Post-procedural pain: 0   Response to treatment: procedure was tolerated well    Debridement   Wound 10/13/24 Diabetic Ulcer Toe D1, great Anterior;Left    Universal Protocol:  procedure performed by consultantConsent: Verbal consent obtained. Written consent obtained.  Consent given by: patient  Time out: Immediately prior to procedure a \"time out\" was called to verify the correct patient, procedure, equipment, support staff and site/side marked as required.  Patient understanding: patient states understanding of the procedure being performed  Patient identity confirmed: verbally with patient    Debridement Details  Performed by: physician  Debridement type: selective  Pain control: lidocaine 4%      Post-debridement measurements  Length (cm): 3.6  Width (cm): 2.5  Depth (cm): 0.1  Percent debrided: " 100%  Surface Area (cm^2): 9  Area Debrided (cm^2): 9  Volume (cm^3): 0.9    Devitalized tissue debrided: fibrin and slough  Instrument(s) utilized: curette  Bleeding: small  Hemostasis obtained with: not applicable  Procedural pain (0-10): insensate  Post-procedural pain: insensate   Response to treatment: procedure was tolerated well               Lab Results   Component Value Date    HGBA1C 5.9 (H) 10/06/2024       Wound Instructions:  Orders Placed This Encounter   Procedures    Wound Procedure Treatment Traumatic Left;Lower Back     This order was created via procedure documentation    Wound Procedure Treatment Diabetic Ulcer Anterior;Left Toe D1, great     This order was created via procedure documentation    Wound cleansing and dressings Traumatic Left;Lower Back     Wash your hands with soap and water.  Remove old dressing, discard into plastic bag and place in trash.  Cleanse the wound with soap and water or wound cleanser prior to applying a clean dressing. Do not use tissue or cotton balls. Do not scrub the wound. Pat dry using gauze.  Shower yes   Apply skin prep to skin surrounding wound  Apply hydrocolloid to the left back wound.  VNA or wife to change dressing every 4 days and as needed per dislodgment     Standing Status:   Future     Standing Expiration Date:   11/7/2024    Wound cleansing and dressings Diabetic Ulcer Anterior;Left Toe D1, great     Wash your hands with soap and water.  Remove old dressing, discard into plastic bag and place in trash.  Cleanse the wound with soap and water or wound cleanser prior to applying a clean dressing. Do not use tissue or cotton balls. Do not scrub the wound. Pat dry using gauze.  Shower yes   Apply Dermagran to the left foot/toe wound.  Cover with gauze  Secure with rolled gauze and tape  VNA or wife to change dressing every other day    Keep pressure off of foot wound as much as possible     Standing Status:   Future     Standing Expiration Date:    "11/7/2024    Debridement     This order was created via procedure documentation    Debridement     This order was created via procedure documentation             Rodney Stroud MD, CHT, CWS    Portions of the record may have been created with voice recognition software. Occasional wrong word or \"sound alike\" substitutions may have occurred due to the inherent limitations of voice recognition software. Read the chart carefully and recognize, using context, where substitutions have occurred.      "

## 2024-10-24 NOTE — PATIENT INSTRUCTIONS
Orders Placed This Encounter   Procedures    Wound cleansing and dressings Traumatic Left;Lower Back     Wash your hands with soap and water.  Remove old dressing, discard into plastic bag and place in trash.  Cleanse the wound with soap and water or wound cleanser prior to applying a clean dressing. Do not use tissue or cotton balls. Do not scrub the wound. Pat dry using gauze.  Shower yes   Apply skin prep to skin surrounding wound  Apply hydrocolloid to the left back wound.  VNA or wife to change dressing every 4 days and as needed per dislodgment     Standing Status:   Future     Standing Expiration Date:   11/7/2024    Wound cleansing and dressings Diabetic Ulcer Anterior;Left Toe D1, great     Wash your hands with soap and water.  Remove old dressing, discard into plastic bag and place in trash.  Cleanse the wound with soap and water or wound cleanser prior to applying a clean dressing. Do not use tissue or cotton balls. Do not scrub the wound. Pat dry using gauze.  Shower yes   Apply Dermagran to the left foot/toe wound.  Cover with gauze  Secure with rolled gauze and tape  VNA or wife to change dressing every other day    Keep pressure off of foot wound as much as possible     Standing Status:   Future     Standing Expiration Date:   11/7/2024

## 2024-10-24 NOTE — PROGRESS NOTES
Wound Procedure Treatment Diabetic Ulcer Anterior;Left Toe D1, great    Performed by: Naty Burns RN  Authorized by: Rodney Stroud MD    Associated wounds:   Wound 10/13/24 Diabetic Ulcer Toe D1, great Anterior;Left  Wound cleansed with:  NSS  Applied primary dressing:  Dermagran  Applied secondary dressing:  Gauze  Dressing secured with:  Luiza and Tape  Offloading device appllied:  Surgical shoe

## 2024-10-25 ENCOUNTER — HOME CARE VISIT (OUTPATIENT)
Dept: HOME HEALTH SERVICES | Facility: HOME HEALTHCARE | Age: 69
End: 2024-10-25
Payer: COMMERCIAL

## 2024-10-25 VITALS — OXYGEN SATURATION: 97 % | DIASTOLIC BLOOD PRESSURE: 78 MMHG | SYSTOLIC BLOOD PRESSURE: 134 MMHG | HEART RATE: 79 BPM

## 2024-10-25 PROCEDURE — G0151 HHCP-SERV OF PT,EA 15 MIN: HCPCS

## 2024-10-29 ENCOUNTER — HOME CARE VISIT (OUTPATIENT)
Dept: HOME HEALTH SERVICES | Facility: HOME HEALTHCARE | Age: 69
End: 2024-10-29
Payer: COMMERCIAL

## 2024-10-29 VITALS
OXYGEN SATURATION: 100 % | BODY MASS INDEX: 23.95 KG/M2 | TEMPERATURE: 97.3 F | DIASTOLIC BLOOD PRESSURE: 70 MMHG | RESPIRATION RATE: 18 BRPM | HEIGHT: 68 IN | HEART RATE: 66 BPM | SYSTOLIC BLOOD PRESSURE: 138 MMHG | WEIGHT: 158 LBS

## 2024-10-29 VITALS
DIASTOLIC BLOOD PRESSURE: 78 MMHG | RESPIRATION RATE: 16 BRPM | SYSTOLIC BLOOD PRESSURE: 146 MMHG | TEMPERATURE: 97.1 F | HEART RATE: 72 BPM | OXYGEN SATURATION: 96 %

## 2024-10-29 PROCEDURE — G0157 HHC PT ASSISTANT EA 15: HCPCS

## 2024-10-29 PROCEDURE — G0299 HHS/HOSPICE OF RN EA 15 MIN: HCPCS

## 2024-10-30 PROCEDURE — 10330064 FOAM, ADH SIL W/BORDER SACRAL 7X7" (10/"

## 2024-10-30 PROCEDURE — 10330064 DRESSING, HYDROGEL 4X4 (15/BX 4BX/CS)

## 2024-10-30 PROCEDURE — 10330064 BANDAGE, CNFRM 4X4.1YDS N/S LF (12RL/BG"

## 2024-10-30 PROCEDURE — 10330064 PAD, ABD 5X9 STR LF (1/PK 20PK/BX) MGM1"

## 2024-10-30 PROCEDURE — 10330064 DRESSING, HYDROCELLULAR FM N/ADH W/FILM

## 2024-11-01 ENCOUNTER — HOME CARE VISIT (OUTPATIENT)
Dept: HOME HEALTH SERVICES | Facility: HOME HEALTHCARE | Age: 69
End: 2024-11-01
Payer: COMMERCIAL

## 2024-11-05 ENCOUNTER — HOME CARE VISIT (OUTPATIENT)
Dept: HOME HEALTH SERVICES | Facility: HOME HEALTHCARE | Age: 69
End: 2024-11-05
Payer: COMMERCIAL

## 2024-11-05 VITALS
OXYGEN SATURATION: 98 % | TEMPERATURE: 97.6 F | DIASTOLIC BLOOD PRESSURE: 72 MMHG | HEIGHT: 68 IN | SYSTOLIC BLOOD PRESSURE: 138 MMHG | HEART RATE: 81 BPM | WEIGHT: 158 LBS | BODY MASS INDEX: 23.95 KG/M2 | RESPIRATION RATE: 18 BRPM

## 2024-11-05 PROCEDURE — G0299 HHS/HOSPICE OF RN EA 15 MIN: HCPCS

## 2024-11-07 ENCOUNTER — OFFICE VISIT (OUTPATIENT)
Dept: WOUND CARE | Facility: HOSPITAL | Age: 69
End: 2024-11-07
Payer: COMMERCIAL

## 2024-11-07 VITALS
HEART RATE: 60 BPM | DIASTOLIC BLOOD PRESSURE: 90 MMHG | SYSTOLIC BLOOD PRESSURE: 140 MMHG | RESPIRATION RATE: 17 BRPM | TEMPERATURE: 96.7 F

## 2024-11-07 DIAGNOSIS — L97.522 DIABETIC ULCER OF TOE OF LEFT FOOT ASSOCIATED WITH TYPE 2 DIABETES MELLITUS, WITH FAT LAYER EXPOSED (HCC): Primary | ICD-10-CM

## 2024-11-07 DIAGNOSIS — L98.412 NON-PRESSURE CHRONIC ULCER OF BUTTOCK WITH FAT LAYER EXPOSED (HCC): ICD-10-CM

## 2024-11-07 DIAGNOSIS — E11.621 DIABETIC ULCER OF TOE OF LEFT FOOT ASSOCIATED WITH TYPE 2 DIABETES MELLITUS, WITH FAT LAYER EXPOSED (HCC): Primary | ICD-10-CM

## 2024-11-07 PROCEDURE — 97597 DBRDMT OPN WND 1ST 20 CM/<: CPT | Performed by: FAMILY MEDICINE

## 2024-11-07 NOTE — PROGRESS NOTES
Wound Procedure Treatment Diabetic Ulcer Anterior;Left Toe D1, great    Performed by: Naty Burns RN  Authorized by: Rodney Stroud MD    Associated wounds:   Wound 10/13/24 Diabetic Ulcer Toe D1, great Anterior;Left  Wound cleansed with:  Soap and water  Applied primary dressing:  Dermagran  Applied secondary dressing:  Gauze  Dressing secured with:  Luiza and Tape

## 2024-11-07 NOTE — PATIENT INSTRUCTIONS
Orders Placed This Encounter   Procedures    Wound cleansing and dressings Diabetic Ulcer Anterior;Left Toe D1, great     Wash your hands with soap and water.  Remove old dressing, discard into plastic bag and place in trash.  Cleanse the wound with soap and water or wound cleanser prior to applying a clean dressing. Do not use tissue or cotton balls. Do not scrub the wound. Pat dry using gauze.  Shower yes   Apply Dermagran to the left foot/toe wound.  Cover with gauze  Secure with rolled gauze and tape  VNA or wife to change dressing every other day     Keep pressure off of foot wound as much as possible     Standing Status:   Future     Standing Expiration Date:   11/21/2024

## 2024-11-07 NOTE — PROGRESS NOTES
Patient ID: Juan San is a 69 y.o. male Date of Birth 1955       Chief Complaint   Patient presents with    Follow Up Wound Care Visit     Left foot wound       Allergies:  Abilify [aripiprazole], Cephalexin, Molds & smuts, and Pregabalin    Diagnosis:      Diagnosis ICD-10-CM Associated Orders   1. Diabetic ulcer of toe of left foot associated with type 2 diabetes mellitus, with fat layer exposed (HCC)  E11.621 Wound cleansing and dressings Diabetic Ulcer Anterior;Left Toe D1, great    L97.522 Wound Procedure Treatment Diabetic Ulcer Anterior;Left Toe D1, great      2. 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  Selective debridement.  Continue with Dermagran.  Follow-up in 2 weeks.   A1C results reviewed with the patient today.  Left buttock ulcer is now closed.         Subjective:   11/7/2024: Follow-up nonpressure ulcer of the left buttock.  Patient states that is now closed.  Also follow-up DFU of the left hallux.  Continues to improve with Dermagran.  No new complaints.  Denies any fever or chills.    10/24/2024: First visit for this 69-year-old male comes to the wound center because of wound of the left buttock and left hallux.  Patient states that about 3 weeks ago he was using an IcyHot and fell asleep while laying on his left buttock.  Developed a wound.  VNA has been coming to the house and using Dermagran.  Very slow progress.  About the same time, he developed a large blister at the base of the left hallux.  This subsequently ruptured and was left with an ulceration.  Patient has a history of venous stasis.  He did have some more swelling of his legs.  VNA has been using Dermagran and this is well.  Slow improvement to both.  Patient has type 2 diabetes, with peripheral neuropathy.  In addition, he has chronic low back pain status post multiple surgeries and is waiting for more  surgery after his wounds heal.  He has ambulatory dysfunction with a history of falls.  He does use a walker or wheelchair.        The following portions of the patient's history were reviewed and updated as appropriate:   Patient Active Problem List   Diagnosis    Alcoholism in remission (HCC)    Benzodiazepine dependence (HCC)    Bilateral adhesive capsulitis of shoulders    Bronchitis, mucopurulent recurrent (HCC)    Cirrhosis, alcoholic (HCC)    Diabetic peripheral neuropathy (HCC)    DM (diabetes mellitus) (HCC)    Herniated nucleus pulposus of lumbosacral region    Hypercholesterolemia    Lumbar spondylosis    Thyroid cancer (HCC)    Hypertensive urgency    Liver disease    Chronic pain syndrome    Lumbar radiculopathy    Chronic bilateral low back pain without sciatica    Nausea and vomiting    Medical marijuana use    Urinary retention    Anemia    Hyponatremia    Gallstones    Status post lumbar spinal fusion    Benign prostatic hyperplasia with urinary frequency    Scrotal pain    Lower urinary tract symptoms    Acute on chronic bilateral low back pain with sciatica    Anxiety and depression    Hypertension    Hypochromic microcytic anemia    Discitis of lumbosacral region    Foraminal stenosis of lumbar region    Lactic acidosis    Type 2 diabetes mellitus with hyperglycemia, without long-term current use of insulin (HCC)    Failure of joint fusion (HCC)    Prostatitis    Lower extremity edema    Venous stasis ulcers (HCC)    LIGIA (acute kidney injury) (HCC)    Elevated troponin I level    Ambulatory dysfunction    SIRS (systemic inflammatory response syndrome) (HCC)    Hypokalemia    Metabolic acidosis, increased anion gap     Past Medical History:   Diagnosis Date    Anxiety     Arthritis     Cancer (HCC)     Chronic back pain     Depression     Diabetes mellitus (HCC)     Hypertension     Liver disease     Mitral valve prolapse     Peripheral neuropathy     Neuropathy    PONV (postoperative nausea and  vomiting)     Thyroid disease      Past Surgical History:   Procedure Laterality Date    COLONOSCOPY      INCISION AND DRAINAGE OF WOUND Left 2022    Procedure: INCISION AND DRAINAGE (I&D) EXTREMITY;  Surgeon: Moustapha Cote DPM;  Location: [facility];  Service: Podiatry    IR BIOPSY SPINE  2024    IR BIOPSY SPINE  2024    IR PICC PLACEMENT SINGLE LUMEN  2024    JOINT REPLACEMENT Left 2022    Left TSA    KS ARTHRODESIS POSTERIOR/PSTLAT TQ 1NTRSPC LUMBAR Bilateral 2023    Procedure: L1-S1 navigated posterior decompression with instrumented fixation fusion;  Surgeon: James Moraes MD;  Location:  MAIN OR;  Service: Neurosurgery    THYROID SURGERY      remove cancer     Family History   Problem Relation Age of Onset    No Known Problems Father     No Known Problems Mother     Colon cancer Neg Hx       Social History     Socioeconomic History    Marital status: /Civil Union     Spouse name: Not on file    Number of children: Not on file    Years of education: Not on file    Highest education level: Not on file   Occupational History    Not on file   Tobacco Use    Smoking status: Former     Current packs/day: 0.00     Average packs/day: 0.3 packs/day for 5.9 years (1.5 ttl pk-yrs)     Types: Cigarettes     Start date: 1975     Quit date: 1981     Years since quittin.4    Smokeless tobacco: Never    Tobacco comments:     quit ; smokes when in pain as of 26   Vaping Use    Vaping status: Former    Substances: THC, CBD   Substance and Sexual Activity    Alcohol use: Never    Drug use: Yes     Frequency: 7.0 times per week     Types: Marijuana     Comment: Medical Marijuana, takes for pain, daily, vape    Sexual activity: Yes     Partners: Female     Birth control/protection: None   Other Topics Concern    Not on file   Social History Narrative    Not on file     Social Determinants of Health     Financial Resource Strain: Not on file   Food Insecurity: No  Food Insecurity (10/8/2024)    Nursing - Inadequate Food Risk Classification     Worried About Running Out of Food in the Last Year: Never true     Ran Out of Food in the Last Year: Never true     Ran Out of Food in the Last Year: Not on file   Transportation Needs: No Transportation Needs (10/10/2024)    OASIS : Transportation     Lack of Transportation (Medical): No     Lack of Transportation (Non-Medical): No     Patient Unable or Declines to Respond: No   Physical Activity: Not on file   Stress: Not on file   Social Connections: Not on file   Intimate Partner Violence: Not on file   Housing Stability: Low Risk  (10/8/2024)    Housing Stability Vital Sign     Unable to Pay for Housing in the Last Year: No     Number of Times Moved in the Last Year: 0     Homeless in the Last Year: No        Current Outpatient Medications:     ACCU-CHEK FABIANO PLUS test strip, 4 (four) times a day, Disp: , Rfl: 1    ACCU-CHEK FASTCLIX LANCETS MISC, 4 (four) times a day, Disp: , Rfl: 1    acetaminophen (TYLENOL) 500 mg tablet, Take 2 tablets (1,000 mg total) by mouth every 8 (eight) hours 500 mg tablet, Disp: , Rfl:     clonazePAM (KlonoPIN) 1 mg tablet, Take 1 mg by mouth 2 (two) times a day & 3rd pill PRN for anxiety , Disp: , Rfl:     doxycycline hyclate (VIBRAMYCIN) 100 mg capsule, Take 100 mg by mouth 2 (two) times a day Do not start before August 10, 2024., Disp: , Rfl:     escitalopram (Lexapro) 20 mg tablet, Take 20 mg by mouth daily. Indications: depression, Disp: , Rfl:     finasteride (PROSCAR) 5 mg tablet, TAKE 1 TABLET BY MOUTH DAILY, Disp: 100 tablet, Rfl: 1    folic acid (FOLVITE) 1 mg tablet, Take 2,000 mcg by mouth daily, Disp: , Rfl: 6    furosemide (LASIX) 20 mg tablet, Take 1 tablet (20 mg total) by mouth daily as needed (for weight gain more than 3 lbs overnight or more than 5 lbs in a week, or lower extremity swelling), Disp: 30 tablet, Rfl: 0    furosemide (LASIX) 20 mg tablet, Take 20 mg by mouth. take 1  tab by mouth daily as needed for weight gain more than 3lbs overnight or more than 5lb in a week, or LE swelling., Disp: , Rfl:     gabapentin (NEURONTIN) 100 mg capsule, Take 1 capsule (100 mg total) by mouth daily, Disp: 30 capsule, Rfl: 0    hydrOXYzine pamoate (VISTARIL) 50 mg capsule, Take 50 mg by mouth 2 (two) times a day. Indications: Feeling Anxious, Disp: , Rfl:     Jardiance 10 MG TABS, Take 10 mg by mouth every morning, Disp: , Rfl:     lidocaine (LIDODERM) 5 %, Apply 1 patch topically over 12 hours daily Remove & Discard patch within 12 hours or as directed by MD (Patient taking differently: Apply 1 patch topically daily. Remove & Discard patch within 12 hours or as directed by MD  applied to back for back pain ), Disp: 15 patch, Rfl: 0    lovastatin (MEVACOR) 20 mg tablet, Take 20 mg by mouth every morning, Disp: , Rfl: 3    metFORMIN (GLUCOPHAGE) 1000 MG tablet, Take 1,000 mg by mouth 2 (two) times a day with meals , Disp: , Rfl:     methocarbamol (ROBAXIN) 750 mg tablet, Take 1 tablet (750 mg total) by mouth every 6 (six) hours as needed for muscle spasms, Disp: , Rfl:     metoprolol succinate (TOPROL-XL) 50 mg 24 hr tablet, Take 50 mg by mouth 2 (two) times a day, Disp: , Rfl:     mirtazapine (REMERON) 45 MG tablet, Take 45 mg by mouth daily at bedtime, Disp: , Rfl: 1    NIFEdipine (PROCARDIA XL) 30 mg 24 hr tablet, Take 2 tablets (60 mg total) by mouth 2 (two) times a day, Disp: 120 tablet, Rfl: 0    ondansetron (Zofran ODT) 4 mg disintegrating tablet, Take 1 tablet (4 mg total) by mouth every 6 (six) hours as needed for nausea or vomiting, Disp: 30 tablet, Rfl: 0    patient supplied medication, medical marijuana vape as needed per latest dci  Indications: ., Disp: , Rfl:     senna (SENOKOT) 8.6 mg, Take 1 tablet (8.6 mg total) by mouth 2 (two) times a day 2 tabs at bedtime as needed for constipation per latest dci, Disp: , Rfl:     tamsulosin (FLOMAX) 0.4 mg, TAKE 1 CAPSULE BY MOUTH DAILY WITH  DINNER, Disp: 90 capsule, Rfl: 1    Review of Systems   Constitutional:  Negative for chills, fatigue and fever.   HENT:  Negative for congestion, hearing loss and postnasal drip.    Respiratory:  Negative for cough and shortness of breath.    Cardiovascular:  Positive for leg swelling.   Genitourinary:  Negative for urgency.   Musculoskeletal:  Positive for back pain and gait problem (Walker or wheelchair).   Skin:  Positive for wound (Left hallux and left buttock (pt states closed)). Negative for rash.   Neurological:  Positive for numbness (Both legs).   Hematological:  Does not bruise/bleed easily.   Psychiatric/Behavioral:  Positive for dysphoric mood. The patient is not nervous/anxious.        Objective:  /90   Pulse 60   Temp (!) 96.7 °F (35.9 °C)   Resp 17   Pain Score: 0-No pain     Physical Exam  Vitals and nursing note reviewed.   Constitutional:       Appearance: Normal appearance. He is well-developed and normal weight.   HENT:      Head: Normocephalic and atraumatic.   Cardiovascular:      Rate and Rhythm: Normal rate.      Pulses:           Dorsalis pedis pulses are 1+ on the left side.   Pulmonary:      Effort: Pulmonary effort is normal.   Musculoskeletal:      Left foot: Deformity present.        Feet:    Feet:      Left foot:      Protective Sensation: 3 sites tested.  0 sites sensed.      Skin integrity: Ulcer present.      Comments: DFU 1, no signs of infection.  Some fibrin.  Few areas with slough.  Skin:     General: Skin is warm and dry.      Findings: Wound present. No erythema.             Comments: Pressure ulcer of the left buttock is now closed.   Neurological:      Mental Status: He is alert and oriented to person, place, and time.   Psychiatric:         Attention and Perception: Attention normal.         Mood and Affect: Mood and affect normal.         Behavior: Behavior is cooperative.         Cognition and Memory: Cognition normal.         Wound 10/13/24 Diabetic Ulcer Toe  "D1, great Anterior;Left (Active)   Wound Image Images linked 11/07/24 0919   Wound Description Epithelialization;Pink;Pale;Yellow 11/07/24 0919   Emely-wound Assessment Scaly;Callus;Pink 11/07/24 0919   Wound Length (cm) 1.1 cm 11/07/24 0919   Wound Width (cm) 1.5 cm 11/07/24 0919   Wound Depth (cm) 0.1 cm 11/07/24 0919   Wound Surface Area (cm^2) 1.65 cm^2 11/07/24 0919   Wound Volume (cm^3) 0.165 cm^3 11/07/24 0919   Calculated Wound Volume (cm^3) 0.17 cm^3 11/07/24 0919   Change in Wound Size % -1600 11/07/24 0919   Drainage Amount Moderate 11/07/24 0919   Drainage Description Serosanguineous;Tan 11/07/24 0919   Non-staged Wound Description Full thickness 11/07/24 0919   Dressing Status Intact 11/07/24 0919        Debridement   Wound 10/13/24 Diabetic Ulcer Toe D1, great Anterior;Left    Universal Protocol:  procedure performed by consultantConsent: Verbal consent obtained. Written consent obtained.  Consent given by: patient  Time out: Immediately prior to procedure a \"time out\" was called to verify the correct patient, procedure, equipment, support staff and site/side marked as required.  Patient understanding: patient states understanding of the procedure being performed  Patient identity confirmed: verbally with patient    Debridement Details  Performed by: physician  Debridement type: selective  Pain control: lidocaine 4%      Post-debridement measurements  Length (cm): 1.1  Width (cm): 1.5  Depth (cm): 0.1  Percent debrided: 100%  Surface Area (cm^2): 1.65  Area Debrided (cm^2): 1.65  Volume (cm^3): 0.17    Devitalized tissue debrided: fibrin  Instrument(s) utilized: blade  Bleeding: small  Hemostasis obtained with: pressure  Procedural pain (0-10): insensate  Post-procedural pain: insensate   Response to treatment: procedure was tolerated well               Lab Results   Component Value Date    HGBA1C 5.9 (H) 10/06/2024       Wound Instructions:  Orders Placed This Encounter   Procedures    Wound cleansing " "and dressings Diabetic Ulcer Anterior;Left Toe D1, great     Wash your hands with soap and water.  Remove old dressing, discard into plastic bag and place in trash.  Cleanse the wound with soap and water or wound cleanser prior to applying a clean dressing. Do not use tissue or cotton balls. Do not scrub the wound. Pat dry using gauze.  Shower yes   Apply Dermagran to the left foot/toe wound.  Cover with gauze  Secure with rolled gauze and tape  VNA or wife to change dressing every other day     Keep pressure off of foot wound as much as possible     Standing Status:   Future     Standing Expiration Date:   11/21/2024    Wound Procedure Treatment Diabetic Ulcer Anterior;Left Toe D1, great     This order was created via procedure documentation             Rodney Stroud MD, CHT, CWS    Portions of the record may have been created with voice recognition software. Occasional wrong word or \"sound alike\" substitutions may have occurred due to the inherent limitations of voice recognition software. Read the chart carefully and recognize, using context, where substitutions have occurred.      "

## 2024-11-12 ENCOUNTER — HOME CARE VISIT (OUTPATIENT)
Dept: HOME HEALTH SERVICES | Facility: HOME HEALTHCARE | Age: 69
End: 2024-11-12
Payer: COMMERCIAL

## 2024-11-15 ENCOUNTER — HOME CARE VISIT (OUTPATIENT)
Dept: HOME HEALTH SERVICES | Facility: HOME HEALTHCARE | Age: 69
End: 2024-11-15
Payer: COMMERCIAL

## 2024-11-15 VITALS
OXYGEN SATURATION: 98 % | RESPIRATION RATE: 16 BRPM | HEART RATE: 80 BPM | SYSTOLIC BLOOD PRESSURE: 140 MMHG | TEMPERATURE: 97.8 F | DIASTOLIC BLOOD PRESSURE: 68 MMHG

## 2024-11-15 PROCEDURE — G0299 HHS/HOSPICE OF RN EA 15 MIN: HCPCS

## 2024-11-18 ENCOUNTER — HOME CARE VISIT (OUTPATIENT)
Dept: HOME HEALTH SERVICES | Facility: HOME HEALTHCARE | Age: 69
End: 2024-11-18
Payer: COMMERCIAL

## 2024-11-18 VITALS
OXYGEN SATURATION: 97 % | HEART RATE: 78 BPM | DIASTOLIC BLOOD PRESSURE: 70 MMHG | RESPIRATION RATE: 20 BRPM | TEMPERATURE: 98 F | SYSTOLIC BLOOD PRESSURE: 140 MMHG

## 2024-11-18 PROCEDURE — G0299 HHS/HOSPICE OF RN EA 15 MIN: HCPCS

## 2024-11-21 ENCOUNTER — HOME CARE VISIT (OUTPATIENT)
Dept: HOME HEALTH SERVICES | Facility: HOME HEALTHCARE | Age: 69
End: 2024-11-21
Payer: COMMERCIAL

## 2024-11-21 ENCOUNTER — OFFICE VISIT (OUTPATIENT)
Dept: WOUND CARE | Facility: HOSPITAL | Age: 69
End: 2024-11-21
Payer: COMMERCIAL

## 2024-11-21 VITALS
DIASTOLIC BLOOD PRESSURE: 78 MMHG | SYSTOLIC BLOOD PRESSURE: 138 MMHG | HEART RATE: 64 BPM | TEMPERATURE: 96.1 F | RESPIRATION RATE: 16 BRPM

## 2024-11-21 DIAGNOSIS — L97.522 DIABETIC ULCER OF TOE OF LEFT FOOT ASSOCIATED WITH TYPE 2 DIABETES MELLITUS, WITH FAT LAYER EXPOSED (HCC): Primary | ICD-10-CM

## 2024-11-21 DIAGNOSIS — E11.621 DIABETIC ULCER OF TOE OF LEFT FOOT ASSOCIATED WITH TYPE 2 DIABETES MELLITUS, WITH FAT LAYER EXPOSED (HCC): Primary | ICD-10-CM

## 2024-11-21 PROCEDURE — 99212 OFFICE O/P EST SF 10 MIN: CPT | Performed by: FAMILY MEDICINE

## 2024-11-21 NOTE — PATIENT INSTRUCTIONS
Orders Placed This Encounter   Procedures    Wound cleansing and dressings Diabetic Ulcer Anterior;Left Toe D1, great     Your wound is healed. Skin is fragile--silicone bordered foam dressing applied today for protection. Leave this intact for a few days, can stay on for a week if stays clean dry and intact. Then remove and leave open to air. Gently cleanse area when washing.  Moisturize daily.  Call wound care center if wound re-opens  Thank you for choosing [facility] wound care     Standing Status:   Future     Expiration Date:   11/21/2024

## 2024-11-21 NOTE — PROGRESS NOTES
Patient ID: Juan San is a 69 y.o. male Date of Birth 1955       Chief Complaint   Patient presents with    Follow Up Wound Care Visit     Left foot wound        Allergies:  Abilify [aripiprazole], Cephalexin, Molds & smuts, and Pregabalin    Diagnosis:      Diagnosis ICD-10-CM Associated Orders   1. Diabetic ulcer of toe of left foot associated with type 2 diabetes mellitus, with fat layer exposed ([facility])  E11.621 Wound cleansing and dressings Diabetic Ulcer Anterior;Left Toe D1, great    L97.522               Assessment & Plan:  DFU of the left foot.  It appears recently epithelialized.  Protect with bordered foam for the next few days and keep an eye on it.  Discharge from wound center.  Return to clinic should there be any concern about reopening.   A1C results reviewed with the patient today.         Subjective:   11/21/2024: Follow-up DFU of the left hallux.  Patient believes it might be closed.  No complaints.    11/7/2024: Follow-up nonpressure ulcer of the left buttock.  Patient states that is now closed.  Also follow-up DFU of the left hallux.  Continues to improve with Dermagran.  No new complaints.  Denies any fever or chills.    10/24/2024: First visit for this 69-year-old male comes to the wound center because of wound of the left buttock and left hallux.  Patient states that about 3 weeks ago he was using an IcyHot and fell asleep while laying on his left buttock.  Developed a wound.  VNA has been coming to the house and using Dermagran.  Very slow progress.  About the same time, he developed a large blister at the base of the left hallux.  This subsequently ruptured and was left with an ulceration.  Patient has a history of venous stasis.  He did have some more swelling of his legs.  VNA has been using Dermagran and this is well.  Slow improvement to both.  Patient has type 2 diabetes, with peripheral neuropathy.  In addition, he has chronic low back pain status post multiple surgeries and is  waiting for more surgery after his wounds heal.  He has ambulatory dysfunction with a history of falls.  He does use a walker or wheelchair.        The following portions of the patient's history were reviewed and updated as appropriate:   Patient Active Problem List   Diagnosis    Alcoholism in remission (HCC)    Benzodiazepine dependence (HCC)    Bilateral adhesive capsulitis of shoulders    Bronchitis, mucopurulent recurrent (HCC)    Cirrhosis, alcoholic (HCC)    Diabetic peripheral neuropathy (HCC)    DM (diabetes mellitus) (HCC)    Herniated nucleus pulposus of lumbosacral region    Hypercholesterolemia    Lumbar spondylosis    Thyroid cancer (HCC)    Hypertensive urgency    Liver disease    Chronic pain syndrome    Lumbar radiculopathy    Chronic bilateral low back pain without sciatica    Nausea and vomiting    Medical marijuana use    Urinary retention    Anemia    Hyponatremia    Gallstones    Status post lumbar spinal fusion    Benign prostatic hyperplasia with urinary frequency    Scrotal pain    Lower urinary tract symptoms    Acute on chronic bilateral low back pain with sciatica    Anxiety and depression    Hypertension    Hypochromic microcytic anemia    Discitis of lumbosacral region    Foraminal stenosis of lumbar region    Lactic acidosis    Type 2 diabetes mellitus with hyperglycemia, without long-term current use of insulin (HCC)    Failure of joint fusion (HCC)    Prostatitis    Lower extremity edema    Venous stasis ulcers (HCC)    LIGIA (acute kidney injury) (HCC)    Elevated troponin I level    Ambulatory dysfunction    SIRS (systemic inflammatory response syndrome) (HCC)    Hypokalemia    Metabolic acidosis, increased anion gap     Past Medical History:   Diagnosis Date    Anxiety     Arthritis     Cancer (HCC)     Chronic back pain     Depression     Diabetes mellitus (HCC)     Hypertension     Liver disease     Mitral valve prolapse     Peripheral neuropathy     Neuropathy    PONV  (postoperative nausea and vomiting)     Thyroid disease      Past Surgical History:   Procedure Laterality Date    COLONOSCOPY      INCISION AND DRAINAGE OF WOUND Left 2022    Procedure: INCISION AND DRAINAGE (I&D) EXTREMITY;  Surgeon: Moustapha Cote DPM;  Location:  MAIN OR;  Service: Podiatry    IR BIOPSY SPINE  2024    IR BIOPSY SPINE  2024    IR PICC PLACEMENT SINGLE LUMEN  2024    JOINT REPLACEMENT Left 2022    Left TSA    MD ARTHRODESIS POSTERIOR/PSTLAT TQ 1NTRSPC LUMBAR Bilateral 2023    Procedure: L1-S1 navigated posterior decompression with instrumented fixation fusion;  Surgeon: James Moraes MD;  Location:  MAIN OR;  Service: Neurosurgery    THYROID SURGERY      remove cancer     Family History   Problem Relation Age of Onset    No Known Problems Father     No Known Problems Mother     Colon cancer Neg Hx       Social History     Socioeconomic History    Marital status: /Civil Union     Spouse name: Not on file    Number of children: Not on file    Years of education: Not on file    Highest education level: Not on file   Occupational History    Not on file   Tobacco Use    Smoking status: Former     Current packs/day: 0.00     Average packs/day: 0.3 packs/day for 5.9 years (1.5 ttl pk-yrs)     Types: Cigarettes     Start date: 1975     Quit date: 1981     Years since quittin.5    Smokeless tobacco: Never    Tobacco comments:     quit ; smokes when in pain as of 26   Vaping Use    Vaping status: Former    Substances: THC, CBD   Substance and Sexual Activity    Alcohol use: Never    Drug use: Yes     Frequency: 7.0 times per week     Types: Marijuana     Comment: Medical Marijuana, takes for pain, daily, vape    Sexual activity: Yes     Partners: Female     Birth control/protection: None   Other Topics Concern    Not on file   Social History Narrative    Not on file     Social Drivers of Health     Financial Resource Strain: Not on file    Food Insecurity: No Food Insecurity (10/8/2024)    Nursing - Inadequate Food Risk Classification     Worried About Running Out of Food in the Last Year: Never true     Ran Out of Food in the Last Year: Never true     Ran Out of Food in the Last Year: Not on file   Transportation Needs: No Transportation Needs (10/10/2024)    OASIS : Transportation     Lack of Transportation (Medical): No     Lack of Transportation (Non-Medical): No     Patient Unable or Declines to Respond: No   Physical Activity: Not on file   Stress: Not on file   Social Connections: Not on file   Intimate Partner Violence: Not on file   Housing Stability: Low Risk  (10/8/2024)    Housing Stability Vital Sign     Unable to Pay for Housing in the Last Year: No     Number of Times Moved in the Last Year: 0     Homeless in the Last Year: No        Current Outpatient Medications:     ACCU-CHEK FABIANO PLUS test strip, 4 (four) times a day, Disp: , Rfl: 1    ACCU-CHEK FASTCLIX LANCETS MISC, 4 (four) times a day, Disp: , Rfl: 1    acetaminophen (TYLENOL) 500 mg tablet, Take 2 tablets (1,000 mg total) by mouth every 8 (eight) hours 500 mg tablet, Disp: , Rfl:     clonazePAM (KlonoPIN) 1 mg tablet, Take 1 mg by mouth 2 (two) times a day & 3rd pill PRN for anxiety , Disp: , Rfl:     doxycycline hyclate (VIBRAMYCIN) 100 mg capsule, Take 100 mg by mouth 2 (two) times a day Do not start before August 10, 2024., Disp: , Rfl:     escitalopram (Lexapro) 20 mg tablet, Take 20 mg by mouth daily. Indications: depression, Disp: , Rfl:     finasteride (PROSCAR) 5 mg tablet, TAKE 1 TABLET BY MOUTH DAILY, Disp: 100 tablet, Rfl: 1    folic acid (FOLVITE) 1 mg tablet, Take 2,000 mcg by mouth daily, Disp: , Rfl: 6    furosemide (LASIX) 20 mg tablet, Take 1 tablet (20 mg total) by mouth daily as needed (for weight gain more than 3 lbs overnight or more than 5 lbs in a week, or lower extremity swelling), Disp: 30 tablet, Rfl: 0    furosemide (LASIX) 20 mg tablet, Take  20 mg by mouth. take 1 tab by mouth daily as needed for weight gain more than 3lbs overnight or more than 5lb in a week, or LE swelling., Disp: , Rfl:     gabapentin (NEURONTIN) 100 mg capsule, Take 1 capsule (100 mg total) by mouth daily, Disp: 30 capsule, Rfl: 0    hydrOXYzine pamoate (VISTARIL) 50 mg capsule, Take 50 mg by mouth 2 (two) times a day. Indications: Feeling Anxious, Disp: , Rfl:     Jardiance 10 MG TABS, Take 10 mg by mouth every morning, Disp: , Rfl:     lidocaine (LIDODERM) 5 %, Apply 1 patch topically over 12 hours daily Remove & Discard patch within 12 hours or as directed by MD (Patient taking differently: Apply 1 patch topically daily. Remove & Discard patch within 12 hours or as directed by MD  applied to back for back pain ), Disp: 15 patch, Rfl: 0    lovastatin (MEVACOR) 20 mg tablet, Take 20 mg by mouth every morning, Disp: , Rfl: 3    metFORMIN (GLUCOPHAGE) 1000 MG tablet, Take 1,000 mg by mouth 2 (two) times a day with meals , Disp: , Rfl:     methocarbamol (ROBAXIN) 750 mg tablet, Take 1 tablet (750 mg total) by mouth every 6 (six) hours as needed for muscle spasms, Disp: , Rfl:     metoprolol succinate (TOPROL-XL) 50 mg 24 hr tablet, Take 50 mg by mouth 2 (two) times a day, Disp: , Rfl:     mirtazapine (REMERON) 45 MG tablet, Take 45 mg by mouth daily at bedtime, Disp: , Rfl: 1    NIFEdipine (PROCARDIA XL) 30 mg 24 hr tablet, Take 2 tablets (60 mg total) by mouth 2 (two) times a day, Disp: 120 tablet, Rfl: 0    ondansetron (Zofran ODT) 4 mg disintegrating tablet, Take 1 tablet (4 mg total) by mouth every 6 (six) hours as needed for nausea or vomiting, Disp: 30 tablet, Rfl: 0    patient supplied medication, medical marijuana vape as needed per latest dci  Indications: ., Disp: , Rfl:     senna (SENOKOT) 8.6 mg, Take 1 tablet (8.6 mg total) by mouth 2 (two) times a day 2 tabs at bedtime as needed for constipation per latest dci, Disp: , Rfl:     tamsulosin (FLOMAX) 0.4 mg, TAKE 1 CAPSULE  BY MOUTH DAILY WITH DINNER, Disp: 90 capsule, Rfl: 1    Review of Systems   Constitutional:  Negative for chills and fever.   HENT:  Negative for congestion and hearing loss.    Respiratory:  Negative for cough and shortness of breath.    Cardiovascular:  Negative for leg swelling.   Genitourinary:  Negative for urgency.   Musculoskeletal:  Positive for back pain and gait problem (Walker or wheelchair).   Skin:  Positive for wound (Left hallux and left buttock (pt states closed)). Negative for rash.   Neurological:  Positive for numbness (Both legs).   Hematological:  Does not bruise/bleed easily.   Psychiatric/Behavioral:  Positive for dysphoric mood. The patient is not nervous/anxious.        Objective:  /78   Pulse 64   Temp (!) 96.1 °F (35.6 °C)   Resp 16         Physical Exam  Vitals and nursing note reviewed.   Constitutional:       Appearance: Normal appearance. He is well-developed and normal weight.   HENT:      Head: Normocephalic and atraumatic.   Cardiovascular:      Rate and Rhythm: Normal rate.      Pulses:           Dorsalis pedis pulses are 1+ on the left side.   Pulmonary:      Effort: Pulmonary effort is normal.   Musculoskeletal:      Left foot: Deformity present.        Feet:    Feet:      Left foot:      Protective Sensation: 3 sites tested.  0 sites sensed.      Skin integrity: Ulcer present.      Comments: DFU 1, appears to be recently epithelialized.  Skin:     General: Skin is warm and dry.      Findings: Wound present. No erythema.             Comments: Pressure ulcer of the left buttock is now closed.   Neurological:      Mental Status: He is alert and oriented to person, place, and time.   Psychiatric:         Attention and Perception: Attention normal.         Mood and Affect: Mood and affect normal.         Behavior: Behavior is cooperative.         Cognition and Memory: Cognition normal.         [REMOVED] Wound 10/13/24 Diabetic Ulcer Toe D1, great Anterior;Left (Removed)  "  Wound Image Images linked 11/21/24 0904   Wound Description Epithelialization 11/21/24 0904   Wound Length (cm) 0 cm 11/21/24 0904   Wound Width (cm) 0 cm 11/21/24 0904   Wound Depth (cm) 0 cm 11/21/24 0904   Wound Surface Area (cm^2) 0 cm^2 11/21/24 0904   Wound Volume (cm^3) 0 cm^3 11/21/24 0904   Calculated Wound Volume (cm^3) 0 cm^3 11/21/24 0904   Change in Wound Size % 100 11/21/24 0904   Drainage Amount None 11/21/24 0904   Dressing Status Intact 11/21/24 0904                  Lab Results   Component Value Date    HGBA1C 5.9 (H) 10/06/2024       Wound Instructions:  Orders Placed This Encounter   Procedures    Wound cleansing and dressings Diabetic Ulcer Anterior;Left Toe D1, great     Your wound is healed. Skin is fragile--silicone bordered foam dressing applied today for protection. Leave this intact for a few days, can stay on for a week if stays clean dry and intact. Then remove and leave open to air. Gently cleanse area when washing.  Moisturize daily.  Call wound care center if wound re-opens  Thank you for choosing [facility] wound care     Standing Status:   Future     Expiration Date:   11/21/2024             Rodney Stroud MD, CHT, CWS    Portions of the record may have been created with voice recognition software. Occasional wrong word or \"sound alike\" substitutions may have occurred due to the inherent limitations of voice recognition software. Read the chart carefully and recognize, using context, where substitutions have occurred.    "

## 2024-11-25 ENCOUNTER — HOME CARE VISIT (OUTPATIENT)
Dept: HOME HEALTH SERVICES | Facility: HOME HEALTHCARE | Age: 69
End: 2024-11-25
Payer: COMMERCIAL

## 2024-11-26 ENCOUNTER — OFFICE VISIT (OUTPATIENT)
Dept: FAMILY MEDICINE CLINIC | Facility: CLINIC | Age: 69
End: 2024-11-26
Payer: COMMERCIAL

## 2024-11-26 VITALS
DIASTOLIC BLOOD PRESSURE: 84 MMHG | BODY MASS INDEX: 27.01 KG/M2 | HEIGHT: 68 IN | WEIGHT: 178.2 LBS | RESPIRATION RATE: 17 BRPM | OXYGEN SATURATION: 97 % | TEMPERATURE: 97.6 F | SYSTOLIC BLOOD PRESSURE: 150 MMHG | HEART RATE: 96 BPM

## 2024-11-26 DIAGNOSIS — M47.816 LUMBAR SPONDYLOSIS: ICD-10-CM

## 2024-11-26 DIAGNOSIS — Z79.899 MEDICAL MARIJUANA USE: ICD-10-CM

## 2024-11-26 DIAGNOSIS — Z98.1 STATUS POST LUMBAR SPINAL FUSION: ICD-10-CM

## 2024-11-26 DIAGNOSIS — E11.65 TYPE 2 DIABETES MELLITUS WITH HYPERGLYCEMIA, WITHOUT LONG-TERM CURRENT USE OF INSULIN (HCC): ICD-10-CM

## 2024-11-26 DIAGNOSIS — R26.2 AMBULATORY DYSFUNCTION: ICD-10-CM

## 2024-11-26 DIAGNOSIS — Z23 ENCOUNTER FOR IMMUNIZATION: ICD-10-CM

## 2024-11-26 DIAGNOSIS — Z76.89 ENCOUNTER TO ESTABLISH CARE: Primary | ICD-10-CM

## 2024-11-26 DIAGNOSIS — F13.20 SEDATIVE, HYPNOTIC OR ANXIOLYTIC DEPENDENCE, UNCOMPLICATED (HCC): ICD-10-CM

## 2024-11-26 DIAGNOSIS — F10.21 ALCOHOLISM IN REMISSION (HCC): ICD-10-CM

## 2024-11-26 DIAGNOSIS — F32.A ANXIETY AND DEPRESSION: ICD-10-CM

## 2024-11-26 DIAGNOSIS — F31.81 BIPOLAR II DISORDER (HCC): ICD-10-CM

## 2024-11-26 DIAGNOSIS — M54.16 LUMBAR RADICULOPATHY: ICD-10-CM

## 2024-11-26 DIAGNOSIS — F41.9 ANXIETY AND DEPRESSION: ICD-10-CM

## 2024-11-26 DIAGNOSIS — J41.1 BRONCHITIS, MUCOPURULENT RECURRENT (HCC): ICD-10-CM

## 2024-11-26 DIAGNOSIS — I10 PRIMARY HYPERTENSION: ICD-10-CM

## 2024-11-26 PROCEDURE — 90471 IMMUNIZATION ADMIN: CPT

## 2024-11-26 PROCEDURE — 90662 IIV NO PRSV INCREASED AG IM: CPT

## 2024-11-26 PROCEDURE — 99204 OFFICE O/P NEW MOD 45 MIN: CPT

## 2024-11-26 RX ORDER — BUSPIRONE HYDROCHLORIDE 5 MG/1
5 TABLET ORAL 2 TIMES DAILY PRN
COMMUNITY
Start: 2024-09-23

## 2024-11-26 RX ORDER — GABAPENTIN 400 MG/1
1 CAPSULE ORAL 2 TIMES DAILY
COMMUNITY
Start: 2024-10-19

## 2024-11-26 RX ORDER — PREDNISONE 10 MG/1
TABLET ORAL
COMMUNITY
Start: 2024-11-17

## 2024-11-26 RX ORDER — LACTOBACILLUS RHAMNOSUS GG 10B CELL
1 CAPSULE ORAL DAILY
COMMUNITY
Start: 2024-07-30

## 2024-11-26 RX ORDER — OXYCODONE AND ACETAMINOPHEN 5; 325 MG/1; MG/1
TABLET ORAL
COMMUNITY
Start: 2024-11-17

## 2024-11-26 RX ORDER — POLYETHYLENE GLYCOL 3350 17 G/17G
17 POWDER, FOR SOLUTION ORAL
COMMUNITY
Start: 2024-06-18

## 2024-11-26 RX ORDER — METOCLOPRAMIDE 10 MG/1
10 TABLET ORAL
COMMUNITY
Start: 2024-11-16

## 2024-11-26 RX ORDER — KETOCONAZOLE 20 MG/ML
SHAMPOO, SUSPENSION TOPICAL DAILY
COMMUNITY
Start: 2024-11-16

## 2024-11-26 RX ORDER — NIFEDIPINE 90 MG/1
90 TABLET, FILM COATED, EXTENDED RELEASE ORAL DAILY
COMMUNITY
Start: 2024-11-20

## 2024-11-26 NOTE — ASSESSMENT & PLAN NOTE
Following with Neurosurgery through [facility], [place].  Orders:    Ambulatory Referral to Complex Care Management Program; Future

## 2024-11-26 NOTE — ASSESSMENT & PLAN NOTE
through insurance is trying to help find pt a psychiatrist to manage medications. Comes to me on clonazepam 1 mg, buspirone 5 mg, and escitalopram 20 mg. Currently stable. No medication changes today.   Orders:    Ambulatory Referral to Complex Care Management Program; Future

## 2024-11-26 NOTE — ASSESSMENT & PLAN NOTE
through insurance is trying to help find pt a psychiatrist to manage medications. Comes to me on clonazepam 1 mg, buspirone 5 mg, and escitalopram 20 mg. Currently stable. No medication changes today.     Depression Screening Follow-up Plan: Patient's depression screening was positive with a PHQ-9 score of 8. Patient with underlying depression and was advised to continue current medications as prescribed.    Orders:    Ambulatory Referral to Complex Care Management Program; Future

## 2024-11-26 NOTE — ASSESSMENT & PLAN NOTE
Helps with chronic pain. Continue use as needed.  Orders:    Ambulatory Referral to Complex Care Management Program; Future

## 2024-11-26 NOTE — ASSESSMENT & PLAN NOTE
Continue use of walker. Continue home PT.  Orders:    Ambulatory Referral to Complex Care Management Program; Future

## 2024-11-26 NOTE — ASSESSMENT & PLAN NOTE
Following with Neurosurgery through [facility]. Pt requesting pain medication today. Advised pt to contact neurosurgery provider as they are managing his current condition. Continue use of OTC pain medication and medical marijuana as needed.   Orders:    Ambulatory Referral to Complex Care Management Program; Future

## 2024-11-26 NOTE — ASSESSMENT & PLAN NOTE
Follows with Urology. Urinary retention as a result of past spinal surgery. Stable on tamsulosin 0.4 mg and finasteride 5 mg as prescribed by previous provider. Neurosurgery provider anticipates continued need for medications after upcoming surgery in January. Following with Neurosurgery through [facility].  Orders:    Ambulatory Referral to Complex Care Management Program; Future

## 2024-11-26 NOTE — PROGRESS NOTES
Name: Juan San      : 1955      MRN: [id number]  Encounter Provider: RUIZ Hanks  Encounter Date: 2024   Encounter department: [facility]  :  Assessment & Plan  Encounter to establish care         Primary hypertension  Stable on metoprolol 50 mg and nifedipine 90 mg and nifedipine 60 mg. Not currently taking furosemide 20 mg. No medication changes today. The patient will obtain the lab work ordered today. The patient will be notified of results when available and also any further recommendations.     Orders:    CBC and differential; Future    Comprehensive metabolic panel; Future    Lipid Panel with Direct LDL reflex; Future    TSH, 3rd generation with Free T4 reflex; Future    Ambulatory Referral to Complex Care Management Program; Future    Type 2 diabetes mellitus with hyperglycemia, without long-term current use of insulin (HCC)  Comes to me on metformin 1000 mg and jardiance 10 mg. Also on lovastatin 20 mg. No medication changes today. The patient will obtain the lab work ordered today. The patient will be notified of results when available and also any further recommendations.    Lab Results   Component Value Date    HGBA1C 5.9 (H) 10/06/2024     Orders:    Hemoglobin A1C; Future    Albumin / creatinine urine ratio; Future    Ambulatory referral to Endocrinology; Future    Ambulatory Referral to Complex Care Management Program; Future    Medical marijuana use  Helps with chronic pain. Continue use as needed.  Orders:    Ambulatory Referral to Complex Care Management Program; Future    Anxiety and depression   through insurance is trying to help find pt a psychiatrist to manage medications. Comes to me on clonazepam 1 mg, buspirone 5 mg, and escitalopram 20 mg. Currently stable. No medication changes today.     Depression Screening Follow-up Plan: Patient's depression screening was positive with a PHQ-9 score of 8. Patient with  underlying depression and was advised to continue current medications as prescribed.    Orders:    Ambulatory Referral to Complex Care Management Program; Future    Status post lumbar spinal fusion  Follows with Urology. Urinary retention as a result of past spinal surgery. Stable on tamsulosin 0.4 mg and finasteride 5 mg as prescribed by previous provider. Neurosurgery provider anticipates continued need for medications after upcoming surgery in January. [facility].  Orders:    Ambulatory Referral to Complex Care Management Program; Future    Encounter for immunization    Orders:    influenza vaccine, high-dose, PF 0.5 mL (Fluzone High Dose)    Bipolar II disorder (HCC)   through insurance is trying to help find pt a psychiatrist to manage medications. Comes to me on clonazepam 1 mg, buspirone 5 mg, and escitalopram 20 mg. Currently stable. No medication changes today.   Orders:    Ambulatory Referral to Complex Care Management Program; Future    Sedative, hypnotic or anxiolytic dependence, uncomplicated (HCC)  Comes to me on clonazepam 1 mg. Continue as prescribed  Orders:    Ambulatory Referral to Complex Care Management Program; Future    Bronchitis, mucopurulent recurrent ([facility])  Stable. No current symptoms.       Alcoholism in remission ([facility])  Stable.   Orders:    Ambulatory Referral to Complex Care Management Program; Future    Lumbar radiculopathy  Following with Neurosurgery through [facility]. Pt requesting pain medication today. Advised pt to contact neurosurgery provider as they are managing his current condition. Continue use of OTC pain medication and medical marijuana as needed.   Orders:    Ambulatory Referral to Complex Care Management Program; Future    Lumbar spondylosis  [facility]  Orders:    Ambulatory Referral to Complex Care Management Program; Future    Ambulatory dysfunction  Continue use of walker. Continue home  PT.  Orders:    Ambulatory Referral to Complex Care Management Program; Future    Previous primary care records unavailable for review today. Complex Care Management referral today d/t multiple chronic health problems. The patient will obtain the lab work ordered today prior to the follow up appointment. The results and further recommendations will be discussed at follow up. Follow up as needed or at next regularly scheduled appointment.        Depression Screening and Follow-up Plan: Patient's depression screening was positive with a PHQ-9 score of 8. Patient with underlying depression and was advised to continue current medications as prescribed.     History of Present Illness     Juan San is a 69 y.o. year old male who presents today to [facility]. He is accompanied by his wife, Tatyana.  Physical therapy - [facility]'s  Letter to give to company for wheelchair - electric scooter - [employer] - diagnosis and findings of condition  Neurology        Review of Systems   Constitutional: Negative.  Negative for chills, fatigue and fever.   HENT: Negative.  Negative for congestion, ear pain, rhinorrhea and sore throat.    Eyes: Negative.  Negative for pain and visual disturbance.   Respiratory: Negative.  Negative for cough and shortness of breath.    Cardiovascular: Negative.  Negative for chest pain, palpitations and leg swelling.   Gastrointestinal:  Negative for abdominal pain, constipation, diarrhea, nausea and vomiting.   Endocrine: Negative.    Genitourinary: Negative.  Negative for dysuria, frequency and urgency.   Musculoskeletal:  Positive for arthralgias, back pain, gait problem, myalgias and neck pain. Negative for joint swelling.   Skin: Negative.  Negative for rash.   Allergic/Immunologic: Negative.    Neurological:  Negative for dizziness, weakness, light-headedness and headaches.   Hematological: Negative.    Psychiatric/Behavioral: Negative.       Past Medical History   Past Medical History:    Diagnosis Date    Allergic     Anemia     Anxiety     Arthritis     Cancer (HCC)     Chronic back pain     Depression     Diabetes mellitus (HCC)     Hypertension     Liver disease     Mitral valve prolapse     Peripheral neuropathy     Neuropathy    PONV (postoperative nausea and vomiting)     Thyroid disease      Past Surgical History:   Procedure Laterality Date    COLONOSCOPY      INCISION AND DRAINAGE OF WOUND Left 08/12/2022    Procedure: INCISION AND DRAINAGE (I&D) EXTREMITY;  Surgeon: Moustapha Cote DPM;  Location:  MAIN OR;  Service: Podiatry    IR BIOPSY SPINE  1/30/2024    IR BIOPSY SPINE  2/1/2024    IR PICC PLACEMENT SINGLE LUMEN  2/6/2024    JOINT REPLACEMENT Left 03/11/2022    Left TSA    OR ARTHRODESIS POSTERIOR/PSTLAT TQ 1NTRSPC LUMBAR Bilateral 04/11/2023    Procedure: L1-S1 navigated posterior decompression with instrumented fixation fusion;  Surgeon: James Moraes MD;  Location:  MAIN OR;  Service: Neurosurgery    THYROID SURGERY  2021    remove cancer     Family History   Problem Relation Age of Onset    Hypertension Father     Dementia Mother     Diabetes Mother     Colon cancer Neg Hx       reports that he quit smoking about 43 years ago. His smoking use included cigarettes. He started smoking about 49 years ago. He has a 1.5 pack-year smoking history. He has never used smokeless tobacco. He reports that he does not currently use alcohol after a past usage of about 4.0 - 7.0 standard drinks of alcohol per week. He reports that he does not currently use drugs after having used the following drugs: Marijuana. Frequency: 7.00 times per week.  Current Outpatient Medications on File Prior to Visit   Medication Sig Dispense Refill    ACCU-CHEK FABIANO PLUS test strip 4 (four) times a day  1    ACCU-CHEK FASTCLIX LANCETS MISC 4 (four) times a day  1    acetaminophen (TYLENOL) 500 mg tablet Take 2 tablets (1,000 mg total) by mouth every 8 (eight) hours 500 mg tablet      busPIRone (BUSPAR) 5 mg  tablet Take 5 mg by mouth 2 (two) times a day as needed      CANNABIDIOL PO medical marijuana as needed per latest dci  Indications: .      clonazePAM (KlonoPIN) 1 mg tablet Take 1 mg by mouth 2 (two) times a day & 3rd pill PRN for anxiety       doxycycline hyclate (VIBRAMYCIN) 100 mg capsule Take 100 mg by mouth 2 (two) times a day Do not start before August 10, 2024.      escitalopram (Lexapro) 20 mg tablet Take 20 mg by mouth daily. Indications: depression      finasteride (PROSCAR) 5 mg tablet TAKE 1 TABLET BY MOUTH DAILY 100 tablet 1    folic acid (FOLVITE) 1 mg tablet Take 2,000 mcg by mouth daily  6    furosemide (LASIX) 20 mg tablet Take 20 mg by mouth. take 1 tab by mouth daily as needed for weight gain more than 3lbs overnight or more than 5lb in a week, or LE swelling.      gabapentin (NEURONTIN) 400 mg capsule Take 1 capsule by mouth 2 (two) times a day      hydrOXYzine pamoate (VISTARIL) 50 mg capsule Take 50 mg by mouth 2 (two) times a day. Indications: Feeling Anxious      Jardiance 10 MG TABS Take 10 mg by mouth every morning      ketoconazole (NIZORAL) 2 % shampoo daily Use as directed      Lactobacillus Rhamnosus, GG, (Culturelle) CAPS Take 1 capsule by mouth daily      lidocaine (LIDODERM) 5 % Apply 1 patch topically over 12 hours daily Remove & Discard patch within 12 hours or as directed by MD 15 patch 0    metoclopramide (REGLAN) 10 mg tablet Take 10 mg by mouth 2 (two) times a day before meals      naloxone (NARCAN) 4 mg/0.1 mL nasal spray 4 mg into each nostril      NIFEdipine (ADALAT CC) 90 mg 24 hr tablet Take 90 mg by mouth daily      ondansetron (Zofran ODT) 4 mg disintegrating tablet Take 1 tablet (4 mg total) by mouth every 6 (six) hours as needed for nausea or vomiting 30 tablet 0    oxyCODONE-acetaminophen (PERCOCET) 5-325 mg per tablet TAKE 1 TABLET BY MOUTH EVERY 8 HOURS AS NEEDED FOR SEVERE PAIN (PAIN SCORE 7-10) MAX DAILY AMOUNT 3 TABLETS      patient supplied medication medical  marijuana vape as needed per latest dci  Indications: .      polyethylene glycol (MIRALAX) 17 g packet Take 17 g by mouth      predniSONE 10 mg tablet Take 4 tabs x3 days, 3 tabs x3 days, 2 tabs x3 days, 1 tab x3 days      senna (SENOKOT) 8.6 mg Take 1 tablet (8.6 mg total) by mouth 2 (two) times a day 2 tabs at bedtime as needed for constipation per latest dci      tamsulosin (FLOMAX) 0.4 mg TAKE 1 CAPSULE BY MOUTH DAILY WITH DINNER 90 capsule 1    lovastatin (MEVACOR) 20 mg tablet Take 20 mg by mouth every morning  3    metFORMIN (GLUCOPHAGE) 1000 MG tablet Take 1,000 mg by mouth 2 (two) times a day with meals       methocarbamol (ROBAXIN) 750 mg tablet Take 1 tablet (750 mg total) by mouth every 6 (six) hours as needed for muscle spasms      metoprolol succinate (TOPROL-XL) 50 mg 24 hr tablet Take 50 mg by mouth 2 (two) times a day      mirtazapine (REMERON) 45 MG tablet Take 45 mg by mouth daily at bedtime  1    NIFEdipine (PROCARDIA XL) 30 mg 24 hr tablet Take 2 tablets (60 mg total) by mouth 2 (two) times a day 120 tablet 0    [DISCONTINUED] ARIPiprazole (ABILIFY) 30 mg tablet Take 30 mg by mouth daily at bedtime (Patient not taking: Reported on 7/29/2022)  1    [DISCONTINUED] DIGOX 250 MCG tablet TAKE 1 TABLET BY MOUTH EVERY DAY BUT DO NOT TAKE ON SUNDAY OR WEDNESDAY (Patient not taking: Reported on 7/29/2022)  0    [DISCONTINUED] furosemide (LASIX) 20 mg tablet Take 1 tablet (20 mg total) by mouth daily as needed (for weight gain more than 3 lbs overnight or more than 5 lbs in a week, or lower extremity swelling) 30 tablet 0    [DISCONTINUED] gabapentin (NEURONTIN) 100 mg capsule Take 1 capsule (100 mg total) by mouth daily 30 capsule 0    [DISCONTINUED] glipiZIDE (GLUCOTROL XL) 10 mg 24 hr tablet Take 10 mg by mouth 2 (two) times a day. Indications: Type 2 Diabetes  3    [DISCONTINUED] HUMULIN N 100 UNIT/ML subcutaneous injection INJECT 40 UNITS IN THE MORNING AND 6 UNITS in THE IN THE EVENING AS DIRECTED  "(Patient not taking: Reported on 8/9/2022)  6    [DISCONTINUED] ondansetron (ZOFRAN) 4 mg tablet Take 1 tablet (4 mg total) by mouth every 6 (six) hours as needed for nausea or vomiting (Patient not taking: Reported on 7/29/2022) 12 tablet 0    [DISCONTINUED] propranolol (INDERAL LA) 160 mg Take 160 mg by mouth daily  3     No current facility-administered medications on file prior to visit.     Allergies   Allergen Reactions    Abilify [Aripiprazole] Tremor     Shaking      Cephalexin Diarrhea    Molds & Smuts Allergic Rhinitis     Other Reaction(s): Allergic Rhinitis      Red itchy eye, congestion    Pregabalin Tremor     Lyrica - shaking feeling           Objective   /84   Pulse 96   Temp 97.6 °F (36.4 °C) (Tympanic)   Resp 17   Ht 5' 8\" (1.727 m)   Wt 80.8 kg (178 lb 3.2 oz)   SpO2 97%   BMI 27.10 kg/m²      Physical Exam  Vitals and nursing note reviewed.   Constitutional:       General: He is not in acute distress.     Appearance: Normal appearance. He is overweight. He is not ill-appearing.   HENT:      Head: Normocephalic and atraumatic.   Cardiovascular:      Rate and Rhythm: Normal rate and regular rhythm.      Heart sounds: Normal heart sounds. No murmur heard.  Pulmonary:      Effort: Pulmonary effort is normal. No respiratory distress.      Breath sounds: Normal breath sounds. No wheezing.   Musculoskeletal:      Cervical back: Decreased range of motion.      Thoracic back: Decreased range of motion.      Lumbar back: Decreased range of motion.   Skin:     General: Skin is warm and dry.      Capillary Refill: Capillary refill takes less than 2 seconds.   Neurological:      General: No focal deficit present.      Mental Status: He is alert and oriented to person, place, and time.      Motor: Weakness present.      Gait: Gait abnormal.      Comments: Walker present for gait assistance   Psychiatric:         Mood and Affect: Mood normal.         Behavior: Behavior normal. Behavior is " cooperative.

## 2024-11-26 NOTE — ASSESSMENT & PLAN NOTE
Comes to me on metformin 1000 mg and jardiance 10 mg. Also on lovastatin 20 mg. No medication changes today. The patient will obtain the lab work ordered today. The patient will be notified of results when available and also any further recommendations.    Lab Results   Component Value Date    HGBA1C 5.9 (H) 10/06/2024     Orders:    Hemoglobin A1C; Future    Albumin / creatinine urine ratio; Future    Ambulatory referral to Endocrinology; Future    Ambulatory Referral to Complex Care Management Program; Future

## 2024-11-26 NOTE — ASSESSMENT & PLAN NOTE
Stable on metoprolol 50 mg and nifedipine 90 mg and nifedipine 60 mg. Not currently taking furosemide 20 mg. No medication changes today. The patient will obtain the lab work ordered today. The patient will be notified of results when available and also any further recommendations.     Orders:    CBC and differential; Future    Comprehensive metabolic panel; Future    Lipid Panel with Direct LDL reflex; Future    TSH, 3rd generation with Free T4 reflex; Future    Ambulatory Referral to Complex Care Management Program; Future

## 2024-12-03 ENCOUNTER — PATIENT OUTREACH (OUTPATIENT)
Dept: CASE MANAGEMENT | Facility: HOSPITAL | Age: 69
End: 2024-12-03

## 2024-12-03 DIAGNOSIS — Z75.4 INADEQUATE COMMUNITY RESOURCES: Primary | ICD-10-CM

## 2024-12-03 NOTE — PROGRESS NOTES
PCP referral received. Chart reviewed and call placed to patient. Patients wife Tatyana answered phone and patient was unavailable. I introduced myself and explained the role of the RNCM and CCM services. Tatyana states patient is doing well and they are in need of help with finding a psychiatrist to manage patients medications and someone for talk therapy. She states he was going to [facility] but his insurance changed. She states the PCP does not want to be the one to manage his psych medications. We discussed making a referral to SWCM and she would like a referral placed.     A referral was placed to Ambulatory SWCM.    RNCM to follow.

## 2024-12-05 ENCOUNTER — PATIENT OUTREACH (OUTPATIENT)
Dept: CASE MANAGEMENT | Facility: OTHER | Age: 69
End: 2024-12-05

## 2024-12-05 NOTE — PROGRESS NOTES
Chart review indicates that an order was placed to follow up with pt regarding inadequate community resources including help finding psychiatry to monitor and prescribe medications. Pt had OV on 11/26/24, pt with anxiety, depression and Bipolar Disorder, SW at [facility] is trying to help pt find OP MH. Pt is currently taking medication. PHQ-9 score 8, no SI. OP RN had a conversation with pt wife who reported continued difficulty finding OP MH for patient and SW referral was placed. No prior OP SWCM involvement found.     SWCM outreached pt to discuss above. Pt did answer, VM left. SWCM will await return call.

## 2024-12-06 ENCOUNTER — TELEPHONE (OUTPATIENT)
Age: 69
End: 2024-12-06

## 2024-12-06 ENCOUNTER — TELEPHONE (OUTPATIENT)
Dept: FAMILY MEDICINE CLINIC | Facility: CLINIC | Age: 69
End: 2024-12-06

## 2024-12-06 DIAGNOSIS — M54.42 ACUTE BILATERAL LOW BACK PAIN WITH BILATERAL SCIATICA: Primary | ICD-10-CM

## 2024-12-06 DIAGNOSIS — M54.41 ACUTE BILATERAL LOW BACK PAIN WITH BILATERAL SCIATICA: Primary | ICD-10-CM

## 2024-12-06 DIAGNOSIS — M54.42 ACUTE BILATERAL LOW BACK PAIN WITH BILATERAL SCIATICA: ICD-10-CM

## 2024-12-06 DIAGNOSIS — M54.41 ACUTE BILATERAL LOW BACK PAIN WITH BILATERAL SCIATICA: ICD-10-CM

## 2024-12-06 RX ORDER — TRAMADOL HYDROCHLORIDE 50 MG/1
50 TABLET ORAL 3 TIMES DAILY PRN
Qty: 42 TABLET | Refills: 0 | Status: SHIPPED | OUTPATIENT
Start: 2024-12-06 | End: 2024-12-20

## 2024-12-06 NOTE — TELEPHONE ENCOUNTER
Patient's wife called. Notified her that Tramadol order is placed by Kamila for patient's Back Pain. ABBY Tanner told that order is in place.  LUHA

## 2024-12-06 NOTE — PROGRESS NOTES
Received message patient is experiencing increased pain. Pt was previously referred to Neurosurgeon who is treating the condition. Pt reports being referred back to PCP from Neurosurgery. Will cover short term pain control with Tramadol as prescribed today. PDMP reviewed. Opioid agreement to be obtained at next visit.

## 2024-12-06 NOTE — TELEPHONE ENCOUNTER
Patient wife called and stated that patient is going to be having back surgery in January but until then he is in a great deal of pain. The surgeon told her to reach out to patient's PCP and see if there is anything that can be prescribed until the surgery in January. If something is prescribed please send it to [facility] in [facility].      Please review and call patient wife.

## 2024-12-07 ENCOUNTER — NURSE TRIAGE (OUTPATIENT)
Dept: OTHER | Facility: OTHER | Age: 69
End: 2024-12-07

## 2024-12-07 NOTE — TELEPHONE ENCOUNTER
"Reason for Disposition  • Pharmacy calling with prescription question and triager answers question    Answer Assessment - Initial Assessment Questions  1. NAME of MEDICINE: \"What medicine(s) are you calling about?\"        Tramadol    2. QUESTION: \"What is your question?\" (e.g., double dose of medicine, side effect)        Pharmacy needing to know if naloxone is prescribed and if the provider is aware of the medication interactions.     3. PRESCRIBER: \"Who prescribed the medicine?\" Reason: if prescribed by specialist, call should be referred to that group.        Kamila Reyes    4. SYMPTOMS: \"Do you have any symptoms?\" If Yes, ask: \"What symptoms are you having?\"  \"How bad are the symptoms (e.g., mild, moderate, severe)        Pain    Provided information to the pharmacy and they stated the patient will be able to  the medication shortly. The pharmacy will call the patient directly. Informed patient's spouse and they verbalized understanding.    Protocols used: Medication Question Call-Adult-    "

## 2024-12-07 NOTE — TELEPHONE ENCOUNTER
"Regarding: medication issue  ----- Message from Kateryna CANDELARIO sent at 12/7/2024 10:34 AM EST -----  \"My  was prescribed Tramadol and we went to pick it up at [facility] last night but they said there was a possible interaction and needed to clear it by the doctor\"    "

## 2024-12-08 ENCOUNTER — RA CDI HCC (OUTPATIENT)
Dept: OTHER | Facility: HOSPITAL | Age: 69
End: 2024-12-08

## 2024-12-09 RX ORDER — TRAMADOL HYDROCHLORIDE 50 MG/1
TABLET ORAL
Qty: 42 TABLET | Refills: 0 | OUTPATIENT
Start: 2024-12-09

## 2024-12-09 NOTE — TELEPHONE ENCOUNTER
Call the patient and confirm about these two meds he picked up Tramadol from the pharmacy and he's also taking clonazepam 2 times daily ( sometimes 3 times). I confirm it because of pharmacy comment.

## 2024-12-11 ENCOUNTER — PATIENT OUTREACH (OUTPATIENT)
Dept: CASE MANAGEMENT | Facility: OTHER | Age: 69
End: 2024-12-11

## 2024-12-11 NOTE — LETTER
[address] [facility] [address]  [phone]    Re: Unable to reach   12/11/2024       Dear Juan,    I am a [facility]  and wanted to be certain you had information to contact me should you desire assistance with or have questions about non-medical aspects of your care such as [but not limited to] medical insurance, housing, transportation, material needs, or emergency needs.  If I do not have an answer I will assist you in finding the appropriate agency or individual who can help.      Please feel free to contact me at [phone]. Thank You.    Sincerely,         Zuri Valdez MSW,LSW

## 2024-12-11 NOTE — PROGRESS NOTES
YAMILE LERNER received a referral from RN CM  regarding patient's need for assistance establishing mental health resources. YAMILE LERNER team has made multiple attempts to contact patient/patient's wife to assist, however, attempts to reach patient have been unsuccessful at this time. UTR letter sent. Referral will be closed, however, YAMILE LERNER will be available if call back is received.

## 2024-12-13 LAB
ALBUMIN SERPL-MCNC: 4.4 G/DL (ref 3.9–4.9)
ALBUMIN/CREAT UR: 222 MG/G CREAT (ref 0–29)
ALP SERPL-CCNC: 68 IU/L (ref 44–121)
ALT SERPL-CCNC: 13 IU/L (ref 0–44)
AST SERPL-CCNC: 17 IU/L (ref 0–40)
BASOPHILS # BLD AUTO: 0.1 X10E3/UL (ref 0–0.2)
BASOPHILS NFR BLD AUTO: 1 %
BILIRUB SERPL-MCNC: <0.2 MG/DL (ref 0–1.2)
BUN SERPL-MCNC: 16 MG/DL (ref 8–27)
BUN/CREAT SERPL: 14 (ref 10–24)
CALCIUM SERPL-MCNC: 9.6 MG/DL (ref 8.6–10.2)
CHLORIDE SERPL-SCNC: 99 MMOL/L (ref 96–106)
CHOLEST SERPL-MCNC: 144 MG/DL (ref 100–199)
CO2 SERPL-SCNC: 24 MMOL/L (ref 20–29)
CREAT SERPL-MCNC: 1.12 MG/DL (ref 0.76–1.27)
CREAT UR-MCNC: 26.2 MG/DL
EGFR: 72 ML/MIN/1.73
EOSINOPHIL # BLD AUTO: 0.3 X10E3/UL (ref 0–0.4)
EOSINOPHIL NFR BLD AUTO: 4 %
ERYTHROCYTE [DISTWIDTH] IN BLOOD BY AUTOMATED COUNT: 15.7 % (ref 11.6–15.4)
EST. AVERAGE GLUCOSE BLD GHB EST-MCNC: 140 MG/DL
GLOBULIN SER-MCNC: 2.2 G/DL (ref 1.5–4.5)
GLUCOSE SERPL-MCNC: 196 MG/DL (ref 70–99)
HBA1C MFR BLD: 6.5 % (ref 4.8–5.6)
HCT VFR BLD AUTO: 35.6 % (ref 37.5–51)
HDLC SERPL-MCNC: 58 MG/DL
HGB BLD-MCNC: 10.7 G/DL (ref 13–17.7)
IMM GRANULOCYTES # BLD: 0 X10E3/UL (ref 0–0.1)
IMM GRANULOCYTES NFR BLD: 0 %
LDLC SERPL CALC-MCNC: 60 MG/DL (ref 0–99)
LDLC/HDLC SERPL: 1 RATIO (ref 0–3.6)
LYMPHOCYTES # BLD AUTO: 3 X10E3/UL (ref 0.7–3.1)
LYMPHOCYTES NFR BLD AUTO: 32 %
MCH RBC QN AUTO: 26 PG (ref 26.6–33)
MCHC RBC AUTO-ENTMCNC: 30.1 G/DL (ref 31.5–35.7)
MCV RBC AUTO: 87 FL (ref 79–97)
MICROALBUMIN UR-MCNC: 58.2 UG/ML
MONOCYTES # BLD AUTO: 0.7 X10E3/UL (ref 0.1–0.9)
MONOCYTES NFR BLD AUTO: 7 %
NEUTROPHILS # BLD AUTO: 5.3 X10E3/UL (ref 1.4–7)
NEUTROPHILS NFR BLD AUTO: 56 %
PLATELET # BLD AUTO: 378 X10E3/UL (ref 150–450)
POTASSIUM SERPL-SCNC: 4.1 MMOL/L (ref 3.5–5.2)
PROT SERPL-MCNC: 6.6 G/DL (ref 6–8.5)
RBC # BLD AUTO: 4.11 X10E6/UL (ref 4.14–5.8)
SL AMB VLDL CHOLESTEROL CALC: 26 MG/DL (ref 5–40)
SODIUM SERPL-SCNC: 141 MMOL/L (ref 134–144)
TRIGL SERPL-MCNC: 153 MG/DL (ref 0–149)
TSH SERPL DL<=0.005 MIU/L-ACNC: 2.64 UIU/ML (ref 0.45–4.5)
WBC # BLD AUTO: 9.3 X10E3/UL (ref 3.4–10.8)

## 2024-12-16 ENCOUNTER — RESULTS FOLLOW-UP (OUTPATIENT)
Dept: FAMILY MEDICINE CLINIC | Facility: CLINIC | Age: 69
End: 2024-12-16

## 2024-12-17 ENCOUNTER — CONSULT (OUTPATIENT)
Dept: FAMILY MEDICINE CLINIC | Facility: CLINIC | Age: 69
End: 2024-12-17
Payer: COMMERCIAL

## 2024-12-17 ENCOUNTER — PATIENT OUTREACH (OUTPATIENT)
Dept: CASE MANAGEMENT | Facility: HOSPITAL | Age: 69
End: 2024-12-17

## 2024-12-17 VITALS
WEIGHT: 178 LBS | OXYGEN SATURATION: 96 % | HEIGHT: 68 IN | DIASTOLIC BLOOD PRESSURE: 60 MMHG | RESPIRATION RATE: 17 BRPM | BODY MASS INDEX: 26.98 KG/M2 | SYSTOLIC BLOOD PRESSURE: 150 MMHG | HEART RATE: 75 BPM | TEMPERATURE: 97.4 F

## 2024-12-17 DIAGNOSIS — F41.9 ANXIETY AND DEPRESSION: ICD-10-CM

## 2024-12-17 DIAGNOSIS — M96.0 PSEUDARTHROSIS AFTER FUSION OR ARTHRODESIS: ICD-10-CM

## 2024-12-17 DIAGNOSIS — E11.65 TYPE 2 DIABETES MELLITUS WITH HYPERGLYCEMIA, WITHOUT LONG-TERM CURRENT USE OF INSULIN (HCC): ICD-10-CM

## 2024-12-17 DIAGNOSIS — F32.A ANXIETY AND DEPRESSION: ICD-10-CM

## 2024-12-17 DIAGNOSIS — R26.2 AMBULATORY DYSFUNCTION: ICD-10-CM

## 2024-12-17 DIAGNOSIS — M54.42 ACUTE BILATERAL LOW BACK PAIN WITH BILATERAL SCIATICA: ICD-10-CM

## 2024-12-17 DIAGNOSIS — M46.20 SPINAL ABSCESS (HCC): ICD-10-CM

## 2024-12-17 DIAGNOSIS — E78.00 HYPERCHOLESTEROLEMIA: ICD-10-CM

## 2024-12-17 DIAGNOSIS — L30.9 ECZEMA, UNSPECIFIED TYPE: ICD-10-CM

## 2024-12-17 DIAGNOSIS — Z98.890 PERSONAL HISTORY OF SURGERY TO HEART AND GREAT VESSELS, PRESENTING HAZARDS TO HEALTH: ICD-10-CM

## 2024-12-17 DIAGNOSIS — I10 PRIMARY HYPERTENSION: ICD-10-CM

## 2024-12-17 DIAGNOSIS — M54.41 ACUTE BILATERAL LOW BACK PAIN WITH BILATERAL SCIATICA: ICD-10-CM

## 2024-12-17 DIAGNOSIS — F31.81 BIPOLAR II DISORDER (HCC): ICD-10-CM

## 2024-12-17 DIAGNOSIS — M40.205 KYPHOSIS OF THORACOLUMBAR REGION, UNSPECIFIED KYPHOSIS TYPE: ICD-10-CM

## 2024-12-17 DIAGNOSIS — Z01.818 PRE-OPERATIVE EXAMINATION: Primary | ICD-10-CM

## 2024-12-17 DIAGNOSIS — G89.4 CHRONIC PAIN SYNDROME: ICD-10-CM

## 2024-12-17 DIAGNOSIS — M46.47 DISCITIS OF LUMBOSACRAL REGION: ICD-10-CM

## 2024-12-17 PROCEDURE — G2211 COMPLEX E/M VISIT ADD ON: HCPCS

## 2024-12-17 PROCEDURE — 99214 OFFICE O/P EST MOD 30 MIN: CPT

## 2024-12-17 RX ORDER — TRIAMCINOLONE ACETONIDE 1 MG/G
OINTMENT TOPICAL 2 TIMES DAILY
Qty: 30 G | Refills: 1 | Status: SHIPPED | OUTPATIENT
Start: 2024-12-17

## 2024-12-17 RX ORDER — TRAMADOL HYDROCHLORIDE 50 MG/1
50 TABLET ORAL 3 TIMES DAILY PRN
Qty: 42 TABLET | Refills: 0 | Status: SHIPPED | OUTPATIENT
Start: 2024-12-17 | End: 2024-12-31

## 2024-12-17 NOTE — ASSESSMENT & PLAN NOTE
Stable on metoprolol 50 mg and nifedipine 90 mg and nifedipine 60 mg. Not currently taking furosemide 20 mg. No medication changes today. Continue heart healthy diet.

## 2024-12-17 NOTE — ASSESSMENT & PLAN NOTE
Following with Neurosurgery through [facility]. 11/26 note reviewed.

## 2024-12-17 NOTE — ASSESSMENT & PLAN NOTE
Labs from 12/12/24 reviewed with pt. Continue current medical therapies as prescribed.  Lab Results   Component Value Date    CHOLESTEROL 144 12/12/2024     Lab Results   Component Value Date    HDL 58 12/12/2024     Lab Results   Component Value Date    TRIG 153 (H) 12/12/2024     Lab Results   Component Value Date    LDLCALC 60 12/12/2024

## 2024-12-17 NOTE — PROGRESS NOTES
Pre-operative Clearance  Name: Juan San      : 1955      MRN: [id number]  Encounter Provider: RUIZ Hanks  Encounter Date: 2024   Encounter department: [facility]    Assessment & Plan  Pre-operative examination  Clearance pending ECG results. The patient will be notified of results when available and also any further recommendations.       Discitis of lumbosacral region  Following with Neurosurgery through [facility] note reviewed.       Spinal abscess (HCC)  Following with Neurosurgery through [facility] note reviewed.       Kyphosis of thoracolumbar region, unspecified kyphosis type  Following with Neurosurgery through [facility] note reviewed.       Acute bilateral low back pain with bilateral sciatica  Following with Neurosurgery through [facility] note reviewed. Willing to bridge pain medication until upcoming surgery 1/10/2025. Tramadol 50 mg reordered today. PDMP reviewed. No red flags. An opioid/controlled substance agreement was signed today. We discussed the importance of limiting medication use, risk for dependence, tolerance, fall safety risk, and other specifics outlined in the agreement. He will be completing rehab and physical therapy after his upcoming spinal surgery, which will hopefully be beneficial for improving his pain.  Orders:  •  traMADol (ULTRAM) 50 mg tablet; Take 1 tablet (50 mg total) by mouth 3 (three) times a day as needed for moderate pain for up to 14 days    Pseudarthrosis after fusion or arthrodesis  Following with Neurosurgery through [facility] note reviewed.       Personal history of surgery to heart and great vessels, presenting hazards to health  Pending ECG - possible future referral to Cardiology.       Primary hypertension  Stable on metoprolol 50 mg and nifedipine 90 mg and nifedipine 60 mg. Not currently taking furosemide 20 mg. No medication changes today. Continue heart healthy diet.       Type 2  diabetes mellitus with hyperglycemia, without long-term current use of insulin (HCC)  Continue current medical therapies as prescribed. Recent labs reviewed with pt. Continue healthy lifestyle modifications.  Lab Results   Component Value Date    HGBA1C 6.4 (H) 12/17/2024          Anxiety and depression  Continue current medical therapies as prescribed.  Depression Screening Follow-up Plan: Patient's depression screening was positive with a PHQ-9 score of 20. Patient with underlying depression and was advised to continue current medications as prescribed.          Bipolar II disorder (HCC)  Continue current medical therapies as prescribed.       Ambulatory dysfunction  Continue use of walker or wheelchair depending on pain and level of ambulatory dysfunction.       Chronic pain syndrome  Continue current medical therapies as prescribed.       Hypercholesterolemia  Labs from 12/12/24 reviewed with pt. Continue current medical therapies as prescribed.  Lab Results   Component Value Date    CHOLESTEROL 144 12/12/2024     Lab Results   Component Value Date    HDL 58 12/12/2024     Lab Results   Component Value Date    TRIG 153 (H) 12/12/2024     Lab Results   Component Value Date    LDLCALC 60 12/12/2024           Eczema, unspecified type  Triamcinolone ointment to pharmacy. Pt instructed on use.  Orders:  •  triamcinolone (KENALOG) 0.1 % ointment; Apply topically 2 (two) times a day    Pre-operative Clearance:     Revised Cardiac Risk Index:  RCI RISK CLASS II (1 risk factor, risk of major cardiac complications approximately 1.3%)    Clearance:  Patient is not medically optimized for proposed surgery.  They are not cleared for surgery at this time.       Possible need for further cardiac testing pending ECG results.    Medication Instructions:   - Avoid herbs or non-directed vitamins one week prior to surgery    - Avoid aspirin containing medications or non-steroidal anti-inflammatory drugs one week preceding surgery     - May take tylenol for pain up until the night before surgery    - 5HT3 Antagonist (ie, zofran):  Continue to take this medication on your normal schedule.   - Alpha-1 Adrenergic Blocker (ie, tamsulosin, doxazosin, alfusozin): Continue to take this medication on your normal schedule.  - Antidepressants: Continue to take this medication on your normal schedule.  - Antiepileptic meds: Continue to take this medication on your normal schedule.  - Benzodiazepines (ie, alprazolam, lorazepam, diazepam):  If the medication is needed, continue to take it on your normal schedule.  - Beta blockers:  Continue to take this medication on your normal schedule.  - Buspirone: Continue to take this medication on your normal schedule.  - Calcium channel blockers: Continue to take this medication on your normal schedule.  - Corticosteroids: Continue to take this medication on your normal schedule.  - Diuretics: Continue taking this medication up to the evening before surgery/procedure, but do not take in the morning of the day of surgery/procedure.  - Hyperlipidemia meds: Continue to take this medication on your normal schedule.  - Opioids: Continue to take this medication on your normal schedule.      Medicine Instructions for Adults with Diabetes (NO Bowel Prep)    Follow these instructions when a BOWEL PREP is NOT required for your procedure or surgery!    NOTE:  GLP Agonists taken weekly: do not take in the 7 days before your procedure. **Bariatric surgery: do not take 4 weeks prior to your procedure.    SGLT-2 Inhibitors: do not take in the 4 days before your procedure    On the Day Before Surgery/Procedure  If you are having a procedure (e.g., Colonoscopy) or surgery which DOES NOT require a bowel prep, follow the directions below based on the type of medicine you take for your diabetes.  Type of Medicine You Take Examples What to Do   Pre-Mixed Insulin Intermediate  Lwtcmju17/25, Pimaauq15/30, Novolog 70/30, Regular Insulin  Take 1/2 your regular dose the evening before our procedure.   Rapid/Fast Acting  Insulin and/or Long-Acting Insulin Humalog U200, NovoLog, Apidra,  Lantus, Levemir, Tresiba, Toujeo,  Fias, Basaglar Take your FULL regular dose the day before procedure.   Oral Diabetic Medicines (sulfonylurea) Glipizide/Glimepiride/  Glucotrol Take your regular dose with dinner the evening before your procedure.   Other Oral Diabetic Medicines Metformin, Glucophage, Glucophage  XR, Riomet, Glumetza, Actose,  Avandia, Gl set, Prandin Take your regular dose with dinner the evening before your procedure   GLP Agonists Adlyxin, Byetta, Bydureon,  Ozempic, Soliqua, Tanzeum,  Trulicity, Victoza, Saxenda,  Rybelsus, Wegovy, Mounjaro, Zepbound If taken daily, take as normal  If taken weekly, do not take this medicine for 7 days before your procedure including the day of the procedure (resume taking after the procedure). **Bariatric surgery: do not take 4 weeks prior to procedure   SGLT-2 Inhibitors Jardiance, Invokana, Farxiga, Steglatro, Brenzavvy, Qtern, Segluromet Glyxambi, Synjardy, Synjardy XR, Invokamet, InvokametXR, Trijary XR, Xigduo X Do not take for 4 days before your procedure including the day of the procedure (resume taking after the procedure)   This educational material has been approved by the Patient Education Advisory Committee.    On the Day of Surgery/Procedure  Follow the directions below based on the type of medicine you take for your diabetes.  Type of Medicine You Take  Examples What to Do   Long-Acting Insulin Lantus, Levemir, Tresiba,  Toujeo, Basaglar, Semglee If you normally take your Long Acting Insulin in the morning, take the full dose as scheduled.   GLP-I Agonists Adlyxin, Byetta, Bydureon,  Ozempic, Soliqua, Tanzeum,  Trulicity, Victoza, Saxenda,  Rybelsus, Mounjaro Do NOT take this medicine on the day of your procedure (resume taking after the procedure)   Except for the morning Long-Acting Insulin, DO NOT  "take ANY diabetic medicine on the day of your procedure unless you were instructed by the doctor who manages your diabetes medicines.  Continue to check your blood sugars.  If you have an insulin pump, ask your endocrinologist for instructions at least 3 days before your procedure. NOTE: If you are not able to ask your endocrinologist in advance, on the day of the procedure set your insulin pump to your basal rate only. Bring your insulin pump supplies to the hospital.         History of Present Illness   {?Quick Links Encounters * My Last Note * Last Note in Specialty * Snapshot * Since Last Visit * History :55829}  Juan San is a 69 y.o. year old male who presents today for preoperative clearance. He will be having spinal surgery in January. He is accompanied by his wife, Tatyana.      Pre-op Exam  Surgery: Spinal Surgery  Anticipated Date of Surgery: 1/10/2025  Surgeon: Dr. Zachariah Call    Reports back pain that \"is more there than not\". The pain runs down into his left leg and causes him to have to drag his leg when he walks sometimes. He reports the pain medication helps to take the edge off. He also uses heat and massage. He takes Tylenol, ibuprofen, gabapentin, robaxin.       Previous history of bleeding disorders or clots?: No  Previous Anesthesia reaction?: No  Prolonged steroid use in the last 6 months?: No    Assessment of Cardiac Risk:   - Unstable or severe angina or MI in the last 6 weeks or history of stent placement in the last year?: No   - Decompensated heart failure (e.g. New onset heart failure, NYHA  Class IV heart failure, or worsening existing heart failure)?: No  - Significant arrhythmias such as high grade AV block, symptomatic ventricular arrhythmia, newly recognized ventricular tachycardia, supraventricular tachycardia with resting heart rate >100, or symptomatic bradycardia?: No  - Severe heart valve disease including aortic stenosis or symptomatic mitral stenosis?: No  "     Pre-operative Risk Factors:  Elevated-risk surgery: Yes    History of cerebrovascular disease: No    History of ischemic heart disease: No  Pre-operative treatment with insulin: No  Pre-operative creatinine >2 mg/dL: No    History of congestive heart failure: No    Duke Activity Status Index (DASI):   DASI Total Score: 7.2  METs: 3.6    Medications of Perioperative Concern:   NSAIDs, Calcium channel blockers, Beta blockers, Metformin and SGLT2 inhibitors    Review of Systems   Constitutional: Negative.  Negative for chills, fatigue and fever.   HENT: Negative.  Negative for congestion, ear pain, rhinorrhea and sore throat.    Eyes: Negative.  Negative for pain and visual disturbance.   Respiratory: Negative.  Negative for cough and shortness of breath.    Cardiovascular: Negative.  Negative for chest pain, palpitations and leg swelling.   Gastrointestinal:  Negative for abdominal pain, constipation, diarrhea, nausea and vomiting.   Endocrine: Negative.    Genitourinary: Negative.  Negative for dysuria, frequency and urgency.   Musculoskeletal:  Positive for arthralgias, back pain, gait problem and myalgias. Negative for joint swelling, neck pain and neck stiffness.   Skin: Negative.  Negative for rash.   Allergic/Immunologic: Negative.    Neurological:  Negative for dizziness, weakness, light-headedness and headaches.   Hematological: Negative.    Psychiatric/Behavioral: Negative.       Past Medical History   Past Medical History:   Diagnosis Date   • Allergic    • Anemia    • Anxiety    • Arthritis    • Cancer (HCC)    • Chronic back pain    • Depression    • Diabetes mellitus (HCC)    • Hypertension    • Liver disease    • Mitral valve prolapse    • Peripheral neuropathy     Neuropathy   • PONV (postoperative nausea and vomiting)    • Thyroid disease      Past Surgical History:   Procedure Laterality Date   • COLONOSCOPY     • INCISION AND DRAINAGE OF WOUND Left 08/12/2022    Procedure: INCISION AND DRAINAGE  (I&D) EXTREMITY;  Surgeon: Moustapha Cote DPM;  Location:  MAIN OR;  Service: Podiatry   • IR BIOPSY SPINE  2024   • IR BIOPSY SPINE  2024   • IR PICC PLACEMENT SINGLE LUMEN  2024   • JOINT REPLACEMENT Left 2022    Left TSA   • NM ARTHRODESIS POSTERIOR/PSTLAT TQ 1NTRSPC LUMBAR Bilateral 2023    Procedure: L1-S1 navigated posterior decompression with instrumented fixation fusion;  Surgeon: James Moraes MD;  Location:  MAIN OR;  Service: Neurosurgery   • THYROID SURGERY      remove cancer     Family History   Problem Relation Age of Onset   • Hypertension Father    • Dementia Mother    • Diabetes Mother    • Colon cancer Neg Hx      Social History     Tobacco Use   • Smoking status: Former     Current packs/day: 0.00     Average packs/day: 0.3 packs/day for 5.9 years (1.5 ttl pk-yrs)     Types: Cigarettes     Start date: 1975     Quit date: 1981     Years since quittin.5   • Smokeless tobacco: Never   • Tobacco comments:     quit ; smokes when in pain as of 26   Vaping Use   • Vaping status: Former   • Substances: THC, CBD   Substance and Sexual Activity   • Alcohol use: Not Currently     Alcohol/week: 4.0 - 7.0 standard drinks of alcohol     Types: 1 - 2 Glasses of wine, 2 - 3 Cans of beer, 1 - 2 Shots of liquor per week     Comment: only on special occasions   • Drug use: Not Currently     Frequency: 7.0 times per week     Types: Marijuana     Comment: Medical Marijuana   • Sexual activity: Yes     Partners: Female     Birth control/protection: None     Current Outpatient Medications on File Prior to Visit   Medication Sig   • ACCU-CHEK FABIANO PLUS test strip 4 (four) times a day   • ACCU-CHEK FASTCLIX LANCETS MISC 4 (four) times a day   • acetaminophen (TYLENOL) 500 mg tablet Take 2 tablets (1,000 mg total) by mouth every 8 (eight) hours 500 mg tablet   • busPIRone (BUSPAR) 5 mg tablet Take 5 mg by mouth 2 (two) times a day as needed   • CANNABIDIOL PO  medical marijuana as needed per latest dci  Indications: .   • clonazePAM (KlonoPIN) 1 mg tablet Take 1 mg by mouth 2 (two) times a day & 3rd pill PRN for anxiety    • doxycycline hyclate (VIBRAMYCIN) 100 mg capsule Take 100 mg by mouth 2 (two) times a day Do not start before August 10, 2024.   • escitalopram (Lexapro) 20 mg tablet Take 20 mg by mouth daily. Indications: depression   • finasteride (PROSCAR) 5 mg tablet TAKE 1 TABLET BY MOUTH DAILY   • folic acid (FOLVITE) 1 mg tablet Take 2,000 mcg by mouth daily   • furosemide (LASIX) 20 mg tablet Take 20 mg by mouth. take 1 tab by mouth daily as needed for weight gain more than 3lbs overnight or more than 5lb in a week, or LE swelling.   • gabapentin (NEURONTIN) 400 mg capsule Take 1 capsule by mouth 2 (two) times a day   • hydrOXYzine pamoate (VISTARIL) 50 mg capsule Take 50 mg by mouth 2 (two) times a day. Indications: Feeling Anxious   • Jardiance 10 MG TABS Take 10 mg by mouth every morning   • ketoconazole (NIZORAL) 2 % shampoo daily Use as directed   • Lactobacillus Rhamnosus, GG, (Culturelle) CAPS Take 1 capsule by mouth daily   • lidocaine (LIDODERM) 5 % Apply 1 patch topically over 12 hours daily Remove & Discard patch within 12 hours or as directed by MD   • lovastatin (MEVACOR) 20 mg tablet Take 20 mg by mouth every morning   • metFORMIN (GLUCOPHAGE) 1000 MG tablet Take 1,000 mg by mouth 2 (two) times a day with meals    • methocarbamol (ROBAXIN) 750 mg tablet Take 1 tablet (750 mg total) by mouth every 6 (six) hours as needed for muscle spasms   • metoclopramide (REGLAN) 10 mg tablet Take 10 mg by mouth 2 (two) times a day before meals   • metoprolol succinate (TOPROL-XL) 50 mg 24 hr tablet Take 50 mg by mouth 2 (two) times a day   • mirtazapine (REMERON) 45 MG tablet Take 45 mg by mouth daily at bedtime   • naloxone (NARCAN) 4 mg/0.1 mL nasal spray 4 mg into each nostril   • NIFEdipine (ADALAT CC) 90 mg 24 hr tablet Take 90 mg by mouth daily   •  ondansetron (Zofran ODT) 4 mg disintegrating tablet Take 1 tablet (4 mg total) by mouth every 6 (six) hours as needed for nausea or vomiting   • oxyCODONE-acetaminophen (PERCOCET) 5-325 mg per tablet TAKE 1 TABLET BY MOUTH EVERY 8 HOURS AS NEEDED FOR SEVERE PAIN (PAIN SCORE 7-10) MAX DAILY AMOUNT 3 TABLETS   • patient supplied medication medical marijuana vape as needed per latest dci  Indications: .   • polyethylene glycol (MIRALAX) 17 g packet Take 17 g by mouth   • predniSONE 10 mg tablet Take 4 tabs x3 days, 3 tabs x3 days, 2 tabs x3 days, 1 tab x3 days   • senna (SENOKOT) 8.6 mg Take 1 tablet (8.6 mg total) by mouth 2 (two) times a day 2 tabs at bedtime as needed for constipation per latest dci   • tamsulosin (FLOMAX) 0.4 mg TAKE 1 CAPSULE BY MOUTH DAILY WITH DINNER   • NIFEdipine (PROCARDIA XL) 30 mg 24 hr tablet Take 2 tablets (60 mg total) by mouth 2 (two) times a day   • [DISCONTINUED] ARIPiprazole (ABILIFY) 30 mg tablet Take 30 mg by mouth daily at bedtime (Patient not taking: Reported on 7/29/2022)   • [DISCONTINUED] DIGOX 250 MCG tablet TAKE 1 TABLET BY MOUTH EVERY DAY BUT DO NOT TAKE ON SUNDAY OR WEDNESDAY (Patient not taking: Reported on 7/29/2022)   • [DISCONTINUED] glipiZIDE (GLUCOTROL XL) 10 mg 24 hr tablet Take 10 mg by mouth 2 (two) times a day. Indications: Type 2 Diabetes   • [DISCONTINUED] HUMULIN N 100 UNIT/ML subcutaneous injection INJECT 40 UNITS IN THE MORNING AND 6 UNITS in THE IN THE EVENING AS DIRECTED (Patient not taking: Reported on 8/9/2022)   • [DISCONTINUED] ondansetron (ZOFRAN) 4 mg tablet Take 1 tablet (4 mg total) by mouth every 6 (six) hours as needed for nausea or vomiting (Patient not taking: Reported on 7/29/2022)   • [DISCONTINUED] propranolol (INDERAL LA) 160 mg Take 160 mg by mouth daily     Allergies   Allergen Reactions   • Abilify [Aripiprazole] Tremor     Shaking     • Cephalexin Diarrhea   • Molds & Smuts Allergic Rhinitis     Other Reaction(s): Allergic Rhinitis       "Red itchy eye, congestion   • Pregabalin Tremor     Lyrica - shaking feeling     Objective {?Quick Links Trend Vitals * Enter New Vitals * Results Review * Timeline (Adult) * Labs * Imaging * Cardiology * Procedures * Lung Cancer Screening * Surgical eConsent :09054}  /60 (BP Location: Left arm, Patient Position: Sitting, Cuff Size: Large)   Pulse 75   Temp (!) 97.4 °F (36.3 °C) (Tympanic)   Resp 17   Ht 5' 8\" (1.727 m)   Wt 80.7 kg (178 lb)   SpO2 96%   BMI 27.06 kg/m²     Physical Exam  Vitals and nursing note reviewed.   Constitutional:       General: He is not in acute distress.     Appearance: Normal appearance. He is not ill-appearing.   HENT:      Head: Normocephalic and atraumatic.      Right Ear: Tympanic membrane, ear canal and external ear normal.      Left Ear: Tympanic membrane, ear canal and external ear normal.      Ears:      Comments: Scaling rash of b/l external ear and canals     Nose: Nose normal. No congestion.      Mouth/Throat:      Lips: Pink.      Mouth: Mucous membranes are moist.      Pharynx: Oropharynx is clear. No posterior oropharyngeal erythema.   Eyes:      Extraocular Movements: Extraocular movements intact.      Conjunctiva/sclera: Conjunctivae normal.      Pupils: Pupils are equal, round, and reactive to light.   Cardiovascular:      Rate and Rhythm: Normal rate and regular rhythm.      Heart sounds: Normal heart sounds. No murmur heard.  Pulmonary:      Effort: Pulmonary effort is normal. No respiratory distress.      Breath sounds: Normal breath sounds. No wheezing.   Abdominal:      General: Abdomen is flat. Bowel sounds are normal.      Palpations: Abdomen is soft.      Tenderness: There is no abdominal tenderness.   Musculoskeletal:         General: No tenderness. Normal range of motion.      Cervical back: Normal range of motion and neck supple. No tenderness.      Comments: Wheelchair for assistance   Lymphadenopathy:      Cervical: No cervical adenopathy. "   Skin:     General: Skin is warm and dry.      Capillary Refill: Capillary refill takes less than 2 seconds.      Findings: No bruising or rash.   Neurological:      General: No focal deficit present.      Mental Status: He is alert and oriented to person, place, and time.   Psychiatric:         Mood and Affect: Mood normal.         Behavior: Behavior normal.           RUIZ Hanks

## 2024-12-17 NOTE — PATIENT INSTRUCTIONS
Pre-operative Medication Instructions    Avoid herbs or non-directed vitamins one week prior to surgery  Avoid aspirin containing medications or non-steroidal anti-inflammatory drugs one week preceding surgery  May take tylenol for pain up until the night before surgery    5HT3 Antagonists       Medication Name     ondansetron (Zofran ODT) 4 mg disintegrating tablet     ondansetron (ZOFRAN) 4 mg tablet (Discontinued)        Continue to take this medication on your normal schedule.  If this is an oral medication and you take it in the morning, then you may take this medicine with a sip of water.    Alpha-1 Adrenergic Blockers       Medication Name     tamsulosin (FLOMAX) 0.4 mg        Continue to take this medication on your normal schedule.  If this is an oral medication and you take it in the morning, then you may take this medicine with a sip of water.    Antidepressant Meds       Medication Name     escitalopram (Lexapro) 20 mg tablet     mirtazapine (REMERON) 45 MG tablet        Continue to take this medication on your normal schedule.  If this is an oral medication and you take it in the morning, then you may take this medicine with a sip of water.    Seizure Medication       Medication Name     CANNABIDIOL PO     clonazePAM (KlonoPIN) 1 mg tablet     gabapentin (NEURONTIN) 400 mg capsule        Continue to take this medication on your normal schedule.  If this is an oral medication and you take it in the morning, then you may take this medicine with a sip of water.    Benzodiazepine Antagonist       Medication Name     clonazePAM (KlonoPIN) 1 mg tablet        If this medication is needed please continue to take on your normal schedule.  If you take it in the morning, then you may take this medicine with a sip of water.    Beta Blockers       Medication Name     metoprolol succinate (TOPROL-XL) 50 mg 24 hr tablet     propranolol (INDERAL LA) 160 mg (Discontinued)        Continue to take this heart medication on  your normal schedule.  If this is an oral medication and you take it in the morning, then you may take this medicine with a sip of water.    Buspirone       Medication Name     busPIRone (BUSPAR) 5 mg tablet        Continue to take this medication on your normal schedule.  If this is an oral medication and you take it in the morning, then you may take this medicine with a sip of water.    Calcium Channel Blockers       Medication Name     NIFEdipine (ADALAT CC) 90 mg 24 hr tablet     NIFEdipine (PROCARDIA XL) 30 mg 24 hr tablet        Continue to take this heart medication on your normal schedule.  If this is an oral medication and you take it in the morning, then you may take this medicine with a sip of water.    Steroid Medications       Medication Name     predniSONE 10 mg tablet        Continue to take this medication on your normal schedule.  If this is an oral medication and you take it in the morning, then you may take this medicine with a sip of water.    Diuretics       Medication Name     furosemide (LASIX) 20 mg tablet        Continue this medication up to the evening before surgery/procedure, but do not take the morning of the day of surgery.    Cholesterol lowering meds       Medication Name     lovastatin (MEVACOR) 20 mg tablet        Continue to take this medication on your normal schedule.  If this is an oral medication and you take it in the morning, then you may take this medicine with a sip of water.    Opioid Medications       Medication Name     oxyCODONE-acetaminophen (PERCOCET) 5-325 mg per tablet     traMADol (ULTRAM) 50 mg tablet     traMADol (ULTRAM) 50 mg tablet (Discontinued)        Continue to take this medication on your normal schedule.  If this is an oral medication and you take it in the morning, then you may take this medicine with a sip of water.    Diabetic Medications       Medication Name     Jardiance 10 MG TABS     metFORMIN (GLUCOPHAGE) 1000 MG tablet     glipiZIDE (GLUCOTROL  XL) 10 mg 24 hr tablet (Discontinued)     HUMULIN N 100 UNIT/ML subcutaneous injection (Discontinued)          Medicine Instructions for Adults with Diabetes (NO Bowel Prep)    Follow these instructions when a BOWEL PREP is NOT required for your procedure or surgery!    NOTE:  GLP Agonists taken weekly: do not take in the 7 days before your procedure. **Bariatric surgery: do not take 4 weeks prior to your procedure.    SGLT-2 Inhibitors: do not take in the 4 days before your procedure    On the Day Before Surgery/Procedure  If you are having a procedure (e.g., Colonoscopy) or surgery which DOES NOT require a bowel prep, follow the directions below based on the type of medicine you take for your diabetes.  Type of Medicine You Take Examples What to Do   Pre-Mixed Insulin Intermediate  Fziqmot02/25, Manklbv81/30, Novolog 70/30, Regular Insulin Take 1/2 your regular dose the evening before our procedure.   Rapid/Fast Acting  Insulin and/or Long-Acting Insulin Humalog U200, NovoLog, Apidra,  Lantus, Levemir, Tresiba, Toujeo,  Fias, Basaglar Take your FULL regular dose the day before procedure.   Oral Diabetic Medicines (sulfonylurea) Glipizide/Glimepiride/  Glucotrol Take your regular dose with dinner the evening before your procedure.   Other Oral Diabetic Medicines Metformin, Glucophage, Glucophage  XR, Riomet, Glumetza, Actose,  Avandia, Gl set, Prandin Take your regular dose with dinner the evening before your procedure   GLP Agonists Adlyxin, Byetta, Bydureon,  Ozempic, Soliqua, Tanzeum,  Trulicity, Victoza, Saxenda,  Rybelsus, Wegovy, Mounjaro, Zepbound If taken daily, take as normal  If taken weekly, do not take this medicine for 7 days before your procedure including the day of the procedure (resume taking after the procedure). **Bariatric surgery: do not take 4 weeks prior to procedure   SGLT-2 Inhibitors Jardiance, Invokana, Farxiga, Steglatro, Brenzavvy, Qtern, Segluromet Glyxambi, Synjardy, Synjardy XR,  Invokamet, InvokametXR, Trijary XR, Xigduo X Do not take for 4 days before your procedure including the day of the procedure (resume taking after the procedure)   This educational material has been approved by the Patient Education Advisory Committee.    On the Day of Surgery/Procedure  Follow the directions below based on the type of medicine you take for your diabetes.  Type of Medicine You Take  Examples What to Do   Long-Acting Insulin Lantus, Levemir, Tresiba,  Toujeo, Basaglar, Semglee If you normally take your Long Acting Insulin in the morning, take the full dose as scheduled.   GLP-I Agonists Adlyxin, Byetta, Bydureon,  Ozempic, Soliqua, Tanzeum,  Trulicity, Victoza, Saxenda,  Rybelsus, Mounjaro Do NOT take this medicine on the day of your procedure (resume taking after the procedure)   Except for the morning Long-Acting Insulin, DO NOT take ANY diabetic medicine on the day of your procedure unless you were instructed by the doctor who manages your diabetes medicines.  Continue to check your blood sugars.  If you have an insulin pump, ask your endocrinologist for instructions at least 3 days before your procedure. NOTE: If you are not able to ask your endocrinologist in advance, on the day of the procedure set your insulin pump to your basal rate only. Bring your insulin pump supplies to the hospital.    If you have any questions about taking your diabetes medicines prior to your procedure, please contact the doctor who manages your diabetes medicines.

## 2024-12-17 NOTE — PROGRESS NOTES
Chart review follow up on SWCM referral. SW[facility] referral received and outreach to pt attempted without success.     RNCM removed self from care team since referral was for social needs not medical.     -4

## 2024-12-17 NOTE — ASSESSMENT & PLAN NOTE
Following with Neurosurgery through [facility]. 11/26 note reviewed. Willing to bridge pain medication until upcoming surgery 1/10/2025. Tramadol 50 mg reordered today. PDMP reviewed. No red flags. An opioid/controlled substance agreement was signed today. We discussed the importance of limiting medication use, risk for dependence, tolerance, fall safety risk, and other specifics outlined in the agreement. He will be completing rehab and physical therapy after his upcoming spinal surgery, which will hopefully be beneficial for improving his pain.  Orders:  •  traMADol (ULTRAM) 50 mg tablet; Take 1 tablet (50 mg total) by mouth 3 (three) times a day as needed for moderate pain for up to 14 days

## 2024-12-17 NOTE — ASSESSMENT & PLAN NOTE
Continue current medical therapies as prescribed. Recent labs reviewed with pt. Continue healthy lifestyle modifications.  Lab Results   Component Value Date    HGBA1C 6.4 (H) 12/17/2024

## 2024-12-19 DIAGNOSIS — E11.65 TYPE 2 DIABETES MELLITUS WITH HYPERGLYCEMIA, WITHOUT LONG-TERM CURRENT USE OF INSULIN (HCC): ICD-10-CM

## 2024-12-19 DIAGNOSIS — M48.061 FORAMINAL STENOSIS OF LUMBAR REGION: ICD-10-CM

## 2024-12-19 DIAGNOSIS — R94.31 ABNORMAL ECG: Primary | ICD-10-CM

## 2024-12-19 DIAGNOSIS — Z01.818 PRE-OPERATIVE EXAMINATION: ICD-10-CM

## 2024-12-19 DIAGNOSIS — M46.47 DISCITIS OF LUMBOSACRAL REGION: ICD-10-CM

## 2024-12-19 NOTE — PROGRESS NOTES
Abnormal ECG through [facility] on 12/17 for pre-op testing prior to spine surgery on 1/10/25. Referral to Cardiology placed for evaluation and cardiac clearance.

## 2024-12-20 DIAGNOSIS — F41.9 ANXIETY AND DEPRESSION: ICD-10-CM

## 2024-12-20 DIAGNOSIS — F32.A ANXIETY AND DEPRESSION: ICD-10-CM

## 2024-12-20 DIAGNOSIS — E11.65 TYPE 2 DIABETES MELLITUS WITH HYPERGLYCEMIA, WITHOUT LONG-TERM CURRENT USE OF INSULIN (HCC): Primary | ICD-10-CM

## 2024-12-20 RX ORDER — CLONAZEPAM 1 MG/1
1 TABLET ORAL 2 TIMES DAILY
Start: 2024-12-20

## 2024-12-20 NOTE — PROGRESS NOTES
Metformin refilled. Clonazepam associated with dx - filled 12/11 per PDMP. No refill due. Will work on tapering off medication.

## 2025-01-02 ENCOUNTER — HOSPITAL ENCOUNTER (EMERGENCY)
Facility: HOSPITAL | Age: 70
Discharge: HOME | End: 2025-01-02
Attending: EMERGENCY MEDICINE
Payer: COMMERCIAL

## 2025-01-02 VITALS
HEART RATE: 99 BPM | RESPIRATION RATE: 18 BRPM | OXYGEN SATURATION: 98 % | DIASTOLIC BLOOD PRESSURE: 87 MMHG | SYSTOLIC BLOOD PRESSURE: 172 MMHG | TEMPERATURE: 98.1 F

## 2025-01-02 DIAGNOSIS — F32.A ANXIETY AND DEPRESSION: ICD-10-CM

## 2025-01-02 DIAGNOSIS — M54.9 BACK PAIN: Primary | ICD-10-CM

## 2025-01-02 DIAGNOSIS — E78.00 HYPERCHOLESTEROLEMIA: ICD-10-CM

## 2025-01-02 DIAGNOSIS — F41.9 ANXIETY AND DEPRESSION: ICD-10-CM

## 2025-01-02 DIAGNOSIS — M54.30 SCIATICA: ICD-10-CM

## 2025-01-02 DIAGNOSIS — A08.4 VIRAL GASTROENTERITIS: ICD-10-CM

## 2025-01-02 DIAGNOSIS — F31.81 BIPOLAR II DISORDER (HCC): Primary | ICD-10-CM

## 2025-01-02 PROCEDURE — 96372 THER/PROPH/DIAG INJ SC/IM: CPT

## 2025-01-02 PROCEDURE — 99283 EMERGENCY DEPT VISIT LOW MDM: CPT

## 2025-01-02 PROCEDURE — 99284 EMERGENCY DEPT VISIT MOD MDM: CPT | Performed by: EMERGENCY MEDICINE

## 2025-01-02 RX ORDER — MIRTAZAPINE 45 MG/1
45 TABLET, FILM COATED ORAL
Qty: 30 TABLET | Refills: 1 | Status: SHIPPED | OUTPATIENT
Start: 2025-01-02

## 2025-01-02 RX ORDER — ONDANSETRON 2 MG/ML
4 INJECTION INTRAMUSCULAR; INTRAVENOUS ONCE
Status: COMPLETED | OUTPATIENT
Start: 2025-01-02 | End: 2025-01-02

## 2025-01-02 RX ORDER — KETOROLAC TROMETHAMINE 30 MG/ML
30 INJECTION, SOLUTION INTRAMUSCULAR; INTRAVENOUS ONCE
Status: COMPLETED | OUTPATIENT
Start: 2025-01-02 | End: 2025-01-02

## 2025-01-02 RX ORDER — OXYCODONE AND ACETAMINOPHEN 5; 325 MG/1; MG/1
1 TABLET ORAL EVERY 6 HOURS PRN
Qty: 20 TABLET | Refills: 0 | Status: SHIPPED | OUTPATIENT
Start: 2025-01-02 | End: 2025-01-12

## 2025-01-02 RX ORDER — LOVASTATIN 20 MG/1
20 TABLET ORAL EVERY MORNING
Qty: 90 TABLET | Refills: 1 | Status: SHIPPED | OUTPATIENT
Start: 2025-01-02

## 2025-01-02 RX ORDER — HYDROMORPHONE HCL/PF 1 MG/ML
1 SYRINGE (ML) INJECTION ONCE
Status: COMPLETED | OUTPATIENT
Start: 2025-01-02 | End: 2025-01-02

## 2025-01-02 RX ORDER — CLONAZEPAM 1 MG/1
1 TABLET ORAL 2 TIMES DAILY
Qty: 60 TABLET | Refills: 0 | Status: SHIPPED | OUTPATIENT
Start: 2025-01-02

## 2025-01-02 RX ADMIN — KETOROLAC TROMETHAMINE 30 MG: 30 INJECTION, SOLUTION INTRAMUSCULAR; INTRAVENOUS at 17:30

## 2025-01-02 RX ADMIN — HYDROMORPHONE HYDROCHLORIDE 1 MG: 1 INJECTION, SOLUTION INTRAMUSCULAR; INTRAVENOUS; SUBCUTANEOUS at 17:30

## 2025-01-02 RX ADMIN — ONDANSETRON 4 MG: 2 INJECTION, SOLUTION INTRAMUSCULAR; INTRAVENOUS at 17:30

## 2025-01-02 NOTE — ED PROVIDER NOTES
Time reflects when diagnosis was documented in both MDM as applicable and the Disposition within this note       Time User Action Codes Description Comment    1/2/2025  5:28 PM Moustapha Marlow Add [A08.4] Viral gastroenteritis     1/2/2025  5:33 PM Moustapha Marlow Add [M54.9] Back pain     1/2/2025  5:33 PM Moustapha Marlow Add [M54.30] Sciatica           ED Disposition       ED Disposition   Discharge    Condition   Stable    Date/Time   Thu Jan 2, 2025  5:35 PM    Comment   Juan Jaswinder discharge to home/self care.                   Assessment & Plan       Medical Decision Making  Back pain nausea and vomiting no acute focal neurologic deficits will treat symptomatically follow-up with neurosurgery    Risk  Prescription drug management.             Medications   HYDROmorphone (DILAUDID) injection 1 mg (1 mg Intramuscular Given 1/2/25 1730)   ketorolac (TORADOL) injection 30 mg (30 mg Intramuscular Given 1/2/25 1730)   ondansetron (ZOFRAN) injection 4 mg (4 mg Intramuscular Given 1/2/25 1730)       ED Risk Strat Scores                          SBIRT 22yo+      Flowsheet Row Most Recent Value   Initial Alcohol Screen: US AUDIT-C     1. How often do you have a drink containing alcohol? 0 Filed at: 01/02/2025 1154   2. How many drinks containing alcohol do you have on a typical day you are drinking?  0 Filed at: 01/02/2025 1159   3a. Male UNDER 65: How often do you have five or more drinks on one occasion? 0 Filed at: 01/02/2025 115   3b. FEMALE Any Age, or MALE 65+: How often do you have 4 or more drinks on one occassion? 0 Filed at: 01/02/2025 1159   Audit-C Score 0 Filed at: 01/02/2025 1157   JARON: How many times in the past year have you...    Used an illegal drug or used a prescription medication for non-medical reasons? Never Filed at: 01/02/2025 1157                            History of Present Illness       Chief Complaint   Patient presents with    Back Pain     Ongoing back pain for >1 year; pain got worse 2  days ago -- went to [facility] and got IV dilaudid and zofran, rx for tramadol.  Wife states tramadol hasn't been working. Vomiting due to too much pain -- was prescribed zofran but didn't take any today       Past Medical History:   Diagnosis Date    Allergic     Anemia     Anxiety     Arthritis     Cancer (HCC)     Chronic back pain     Depression     Diabetes mellitus (HCC)     Hypertension     Liver disease     Mitral valve prolapse     Peripheral neuropathy     Neuropathy    PONV (postoperative nausea and vomiting)     Thyroid disease       Past Surgical History:   Procedure Laterality Date    COLONOSCOPY      INCISION AND DRAINAGE OF WOUND Left 2022    Procedure: INCISION AND DRAINAGE (I&D) EXTREMITY;  Surgeon: Moustapha Cote DPM;  Location:  MAIN OR;  Service: Podiatry    IR BIOPSY SPINE  2024    IR BIOPSY SPINE  2024    IR PICC PLACEMENT SINGLE LUMEN  2024    JOINT REPLACEMENT Left 2022    Left TSA    ID ARTHRODESIS POSTERIOR/PSTLAT TQ 1NTRSPC LUMBAR Bilateral 2023    Procedure: L1-S1 navigated posterior decompression with instrumented fixation fusion;  Surgeon: James Moraes MD;  Location:  MAIN OR;  Service: Neurosurgery    THYROID SURGERY      remove cancer      Family History   Problem Relation Age of Onset    Hypertension Father     Dementia Mother     Diabetes Mother     Colon cancer Neg Hx       Social History     Tobacco Use    Smoking status: Former     Current packs/day: 0.00     Average packs/day: 0.3 packs/day for 5.9 years (1.5 ttl pk-yrs)     Types: Cigarettes     Start date: 1975     Quit date: 1981     Years since quittin.6    Smokeless tobacco: Never    Tobacco comments:     quit ; smokes when in pain as of 26   Vaping Use    Vaping status: Former    Substances: THC, CBD   Substance Use Topics    Alcohol use: Not Currently     Alcohol/week: 4.0 - 7.0 standard drinks of alcohol     Types: 1 - 2 Glasses of wine, 2 - 3 Cans of beer, 1 - 2  Shots of liquor per week     Comment: only on special occasions    Drug use: Not Currently     Frequency: 7.0 times per week     Types: Marijuana     Comment: Medical Marijuana      E-Cigarette/Vaping    E-Cigarette Use Former User     Comments medical marijuana - occ       E-Cigarette/Vaping Substances    Nicotine No     THC Yes     CBD Yes     Flavoring No     Other No     Unknown No       I have reviewed and agree with the history as documented.     This is a 69-year-old male with a history of chronic back pain followed at [facility] supposed to have surgery on the 24th of this month seen at [facility] yesterday taking tramadol without pain relief also has nausea and vomiting secondary to the pain.  Denies any incontinence of urine or stool no perineal numbness he is ambulatory with a cane.  No focal paralysis or loss of sensation.  He has known spinal infection initially treated with PICC line and antibiotics and is currently on doxycycline denies any fevers.      History provided by:  Patient and spouse  Medical Problem  Location:  Low back  Quality:  Sharp pain down the left leg  Severity:  Severe  Onset quality:  Gradual  Duration:  12 months  Timing:  Constant  Progression:  Waxing and waning  Chronicity:  Chronic  Context:  Low back pain  Ineffective treatments:  Tramadol  Associated symptoms: nausea and vomiting    Associated symptoms: no fever        Review of Systems   Constitutional:  Negative for fever.   Gastrointestinal:  Positive for nausea and vomiting.   Musculoskeletal:  Positive for back pain.   All other systems reviewed and are negative.          Objective       ED Triage Vitals   Temperature Pulse Blood Pressure Respirations SpO2 Patient Position - Orthostatic VS   01/02/25 1158 01/02/25 1158 01/02/25 1158 01/02/25 1158 01/02/25 1158 01/02/25 1158   98.1 °F (36.7 °C) 99 (!) 172/87 18 98 % Sitting      Temp Source Heart Rate Source BP Location FiO2 (%) Pain Score     01/02/25 1158 -- 01/02/25 1158 -- 01/02/25 1730    Temporal  Left arm  10 - Worst Possible Pain      Vitals      Date and Time Temp Pulse SpO2 Resp BP Pain Score FACES Pain Rating User   01/02/25 1730 -- -- -- -- -- 10 - Worst Possible Pain --    01/02/25 1158 98.1 °F (36.7 °C) 99 98 % 18 172/87 -- -- KP            Physical Exam  Vitals and nursing note reviewed.   Constitutional:       Appearance: He is not ill-appearing or toxic-appearing.   HENT:      Head: Normocephalic and atraumatic.      Right Ear: External ear normal.      Left Ear: External ear normal.   Eyes:      General:         Right eye: No discharge.         Left eye: No discharge.      Extraocular Movements: Extraocular movements intact.      Pupils: Pupils are equal, round, and reactive to light.   Cardiovascular:      Rate and Rhythm: Normal rate and regular rhythm.      Pulses: Normal pulses.      Heart sounds: No murmur heard.     No friction rub. No gallop.   Pulmonary:      Effort: No respiratory distress.      Breath sounds: No stridor. No wheezing, rhonchi or rales.   Abdominal:      General: There is no distension.      Palpations: Abdomen is soft.      Tenderness: There is no abdominal tenderness.   Musculoskeletal:         General: Tenderness present.      Cervical back: No rigidity.      Right lower leg: No edema.      Left lower leg: No edema.      Comments: Left gluteal tenderness   Skin:     General: Skin is warm and dry.      Coloration: Skin is not jaundiced.      Findings: No bruising, erythema or rash.   Neurological:      General: No focal deficit present.      Mental Status: He is alert and oriented to person, place, and time.      Cranial Nerves: No cranial nerve deficit.      Sensory: No sensory deficit.      Motor: No weakness.      Coordination: Coordination normal.      Deep Tendon Reflexes: Reflexes normal.   Psychiatric:         Mood and Affect: Mood normal.         Thought Content: Thought content normal.          Results Reviewed       None            No orders to display       Procedures    ED Medication and Procedure Management   Prior to Admission Medications   Prescriptions Last Dose Informant Patient Reported? Taking?   ACCU-CHEK FABIANO PLUS test strip  Spouse/Significant Other Yes No   Si (four) times a day   ACCU-CHEK FASTCLIX LANCETS MISC  Spouse/Significant Other Yes No   Si (four) times a day   CANNABIDIOL PO  Spouse/Significant Other Yes No   Sig: medical marijuana as needed per latest dci  Indications: .   Jardiance 10 MG TABS  Spouse/Significant Other Yes No   Sig: Take 10 mg by mouth every morning   Lactobacillus Rhamnosus, GG, (Culturelle) CAPS  Spouse/Significant Other Yes No   Sig: Take 1 capsule by mouth daily   NIFEdipine (ADALAT CC) 90 mg 24 hr tablet  Spouse/Significant Other Yes No   Sig: Take 90 mg by mouth daily   NIFEdipine (PROCARDIA XL) 30 mg 24 hr tablet   No No   Sig: Take 2 tablets (60 mg total) by mouth 2 (two) times a day   acetaminophen (TYLENOL) 500 mg tablet  Spouse/Significant Other No No   Sig: Take 2 tablets (1,000 mg total) by mouth every 8 (eight) hours 500 mg tablet   busPIRone (BUSPAR) 5 mg tablet  Spouse/Significant Other Yes No   Sig: Take 5 mg by mouth 2 (two) times a day as needed   clonazePAM (KlonoPIN) 1 mg tablet   No No   Sig: Take 1 tablet (1 mg total) by mouth 2 (two) times a day & 3rd pill PRN for anxiety   doxycycline hyclate (VIBRAMYCIN) 100 mg capsule  Spouse/Significant Other Yes No   Sig: Take 100 mg by mouth 2 (two) times a day Do not start before August 10, 2024.   escitalopram (Lexapro) 20 mg tablet  Spouse/Significant Other Yes No   Sig: Take 20 mg by mouth daily. Indications: depression   finasteride (PROSCAR) 5 mg tablet  Spouse/Significant Other No No   Sig: TAKE 1 TABLET BY MOUTH DAILY   folic acid (FOLVITE) 1 mg tablet  Spouse/Significant Other Yes No   Sig: Take 2,000 mcg by mouth daily   furosemide (LASIX) 20 mg tablet  Spouse/Significant  Other Yes No   Sig: Take 20 mg by mouth. take 1 tab by mouth daily as needed for weight gain more than 3lbs overnight or more than 5lb in a week, or LE swelling.   gabapentin (NEURONTIN) 400 mg capsule  Spouse/Significant Other Yes No   Sig: Take 1 capsule by mouth 2 (two) times a day   hydrOXYzine pamoate (VISTARIL) 50 mg capsule  Spouse/Significant Other Yes No   Sig: Take 50 mg by mouth 2 (two) times a day. Indications: Feeling Anxious   ketoconazole (NIZORAL) 2 % shampoo  Spouse/Significant Other Yes No   Sig: daily Use as directed   lidocaine (LIDODERM) 5 %  Spouse/Significant Other No No   Sig: Apply 1 patch topically over 12 hours daily Remove & Discard patch within 12 hours or as directed by MD   lovastatin (MEVACOR) 20 mg tablet   No No   Sig: Take 1 tablet (20 mg total) by mouth every morning   metFORMIN (GLUCOPHAGE) 1000 MG tablet   No No   Sig: Take 1 tablet (1,000 mg total) by mouth 2 (two) times a day with meals   methocarbamol (ROBAXIN) 750 mg tablet  Spouse/Significant Other No No   Sig: Take 1 tablet (750 mg total) by mouth every 6 (six) hours as needed for muscle spasms   metoclopramide (REGLAN) 10 mg tablet  Spouse/Significant Other Yes No   Sig: Take 10 mg by mouth 2 (two) times a day before meals   metoprolol succinate (TOPROL-XL) 50 mg 24 hr tablet  Spouse/Significant Other Yes No   Sig: Take 50 mg by mouth 2 (two) times a day   mirtazapine (REMERON) 45 MG tablet   No No   Sig: Take 1 tablet (45 mg total) by mouth daily at bedtime   naloxone (NARCAN) 4 mg/0.1 mL nasal spray  Spouse/Significant Other Yes No   Si mg into each nostril   ondansetron (Zofran ODT) 4 mg disintegrating tablet  Spouse/Significant Other No No   Sig: Take 1 tablet (4 mg total) by mouth every 6 (six) hours as needed for nausea or vomiting   oxyCODONE-acetaminophen (PERCOCET) 5-325 mg per tablet  Spouse/Significant Other Yes No   Sig: TAKE 1 TABLET BY MOUTH EVERY 8 HOURS AS NEEDED FOR SEVERE PAIN (PAIN SCORE 7-10) MAX  DAILY AMOUNT 3 TABLETS   oxyCODONE-acetaminophen (PERCOCET) 5-325 mg per tablet   No Yes   Sig: Take 1 tablet by mouth every 6 (six) hours as needed for moderate pain for up to 10 days Max Daily Amount: 4 tablets   patient supplied medication  Spouse/Significant Other Yes No   Sig: medical marijuana vape as needed per latest dci  Indications: .   polyethylene glycol (MIRALAX) 17 g packet  Spouse/Significant Other Yes No   Sig: Take 17 g by mouth   predniSONE 10 mg tablet  Spouse/Significant Other Yes No   Sig: Take 4 tabs x3 days, 3 tabs x3 days, 2 tabs x3 days, 1 tab x3 days   senna (SENOKOT) 8.6 mg  Spouse/Significant Other No No   Sig: Take 1 tablet (8.6 mg total) by mouth 2 (two) times a day 2 tabs at bedtime as needed for constipation per latest dci   tamsulosin (FLOMAX) 0.4 mg  Spouse/Significant Other No No   Sig: TAKE 1 CAPSULE BY MOUTH DAILY WITH DINNER   triamcinolone (KENALOG) 0.1 % ointment   No No   Sig: Apply topically 2 (two) times a day      Facility-Administered Medications: None     Current Discharge Medication List        START taking these medications    Details   clonazePAM (KlonoPIN) 1 mg tablet Take 1 tablet (1 mg total) by mouth 2 (two) times a day & 3rd pill PRN for anxiety  Qty: 60 tablet, Refills: 0    Associated Diagnoses: Anxiety and depression      lovastatin (MEVACOR) 20 mg tablet Take 1 tablet (20 mg total) by mouth every morning  Qty: 90 tablet, Refills: 1    Associated Diagnoses: Hypercholesterolemia      mirtazapine (REMERON) 45 MG tablet Take 1 tablet (45 mg total) by mouth daily at bedtime  Qty: 30 tablet, Refills: 1    Associated Diagnoses: Bipolar II disorder (HCC)           CONTINUE these medications which have CHANGED    Details   oxyCODONE-acetaminophen (PERCOCET) 5-325 mg per tablet Take 1 tablet by mouth every 6 (six) hours as needed for moderate pain for up to 10 days Max Daily Amount: 4 tablets  Qty: 20 tablet, Refills: 0    Associated Diagnoses: Back pain; Sciatica            CONTINUE these medications which have NOT CHANGED    Details   ACCU-CHEK FABIANO PLUS test strip 4 (four) times a day  Refills: 1      ACCU-CHEK FASTCLIX LANCETS MISC 4 (four) times a day  Refills: 1      acetaminophen (TYLENOL) 500 mg tablet Take 2 tablets (1,000 mg total) by mouth every 8 (eight) hours 500 mg tablet    Associated Diagnoses: S/P lumbar fusion      busPIRone (BUSPAR) 5 mg tablet Take 5 mg by mouth 2 (two) times a day as needed      CANNABIDIOL PO medical marijuana as needed per latest dci  Indications: .      doxycycline hyclate (VIBRAMYCIN) 100 mg capsule Take 100 mg by mouth 2 (two) times a day Do not start before August 10, 2024.      escitalopram (Lexapro) 20 mg tablet Take 20 mg by mouth daily. Indications: depression      finasteride (PROSCAR) 5 mg tablet TAKE 1 TABLET BY MOUTH DAILY  Qty: 100 tablet, Refills: 1    Comments: This prescription was filled on 5/7/2024. Any refills authorized will be placed on file.  Associated Diagnoses: Urinary retention      folic acid (FOLVITE) 1 mg tablet Take 2,000 mcg by mouth daily  Refills: 6      furosemide (LASIX) 20 mg tablet Take 20 mg by mouth. take 1 tab by mouth daily as needed for weight gain more than 3lbs overnight or more than 5lb in a week, or LE swelling.      gabapentin (NEURONTIN) 400 mg capsule Take 1 capsule by mouth 2 (two) times a day      hydrOXYzine pamoate (VISTARIL) 50 mg capsule Take 50 mg by mouth 2 (two) times a day. Indications: Feeling Anxious      Jardiance 10 MG TABS Take 10 mg by mouth every morning      ketoconazole (NIZORAL) 2 % shampoo daily Use as directed      Lactobacillus Rhamnosus, GG, (Culturelle) CAPS Take 1 capsule by mouth daily      lidocaine (LIDODERM) 5 % Apply 1 patch topically over 12 hours daily Remove & Discard patch within 12 hours or as directed by MD  Qty: 15 patch, Refills: 0    Associated Diagnoses: S/P lumbar fusion      metFORMIN (GLUCOPHAGE) 1000 MG tablet Take 1 tablet (1,000 mg total) by  mouth 2 (two) times a day with meals  Qty: 60 tablet, Refills: 5    Associated Diagnoses: Type 2 diabetes mellitus with hyperglycemia, without long-term current use of insulin (HCC)      methocarbamol (ROBAXIN) 750 mg tablet Take 1 tablet (750 mg total) by mouth every 6 (six) hours as needed for muscle spasms    Associated Diagnoses: S/P lumbar fusion      metoclopramide (REGLAN) 10 mg tablet Take 10 mg by mouth 2 (two) times a day before meals      metoprolol succinate (TOPROL-XL) 50 mg 24 hr tablet Take 50 mg by mouth 2 (two) times a day      naloxone (NARCAN) 4 mg/0.1 mL nasal spray 4 mg into each nostril      NIFEdipine (ADALAT CC) 90 mg 24 hr tablet Take 90 mg by mouth daily      NIFEdipine (PROCARDIA XL) 30 mg 24 hr tablet Take 2 tablets (60 mg total) by mouth 2 (two) times a day  Qty: 120 tablet, Refills: 0    Associated Diagnoses: Primary hypertension      ondansetron (Zofran ODT) 4 mg disintegrating tablet Take 1 tablet (4 mg total) by mouth every 6 (six) hours as needed for nausea or vomiting  Qty: 30 tablet, Refills: 0    Associated Diagnoses: Viral gastroenteritis      patient supplied medication medical marijuana vape as needed per latest dci  Indications: .      polyethylene glycol (MIRALAX) 17 g packet Take 17 g by mouth      predniSONE 10 mg tablet Take 4 tabs x3 days, 3 tabs x3 days, 2 tabs x3 days, 1 tab x3 days      senna (SENOKOT) 8.6 mg Take 1 tablet (8.6 mg total) by mouth 2 (two) times a day 2 tabs at bedtime as needed for constipation per latest dci    Associated Diagnoses: Chronic bilateral low back pain with bilateral sciatica      tamsulosin (FLOMAX) 0.4 mg TAKE 1 CAPSULE BY MOUTH DAILY WITH DINNER  Qty: 90 capsule, Refills: 1    Associated Diagnoses: S/P lumbar fusion      triamcinolone (KENALOG) 0.1 % ointment Apply topically 2 (two) times a day  Qty: 30 g, Refills: 1    Associated Diagnoses: Eczema, unspecified type           No discharge procedures on file.  ED SEPSIS DOCUMENTATION    Time reflects when diagnosis was documented in both MDM as applicable and the Disposition within this note       Time User Action Codes Description Comment    1/2/2025  5:28 PM Moustapha Marlow [A08.4] Viral gastroenteritis     1/2/2025  5:33 PM Moustapha Marlow [M54.9] Back pain     1/2/2025  5:33 PM Moustapha Marlow [M54.30] Sciatica                  Moustapha Marlow DO  01/02/25 1730

## 2025-01-02 NOTE — TELEPHONE ENCOUNTER
Reason for call: Last order by previous PCP  [x] Refill   [] Prior Auth  [] Other:     Office:   [x] PCP/Provider -   [] Specialty/Provider -     Medication:   Mirtazapine  45 mg daily - completely out of medication   Lovastatin  20 mg daily   Klonopin    1 mg BID with a 3rd PRN     Dose/Frequency:     Quantity:     Pharmacy: [facility] on file     Does the patient have enough for 3 days?   [] Yes   [x] No - Send as HP to POD

## 2025-01-03 ENCOUNTER — VBI (OUTPATIENT)
Dept: FAMILY MEDICINE CLINIC | Facility: CLINIC | Age: 70
End: 2025-01-03

## 2025-01-03 NOTE — TELEPHONE ENCOUNTER
01/03/25 12:19 PM    Patient contacted post ED visit, first outreach attempt made. Message was left for patient to return a call to the VBI Department at [facility]: Phone [phone].    Thank you.  Kathleen Chávez MA  [facility]

## 2025-01-07 NOTE — TELEPHONE ENCOUNTER
01/07/25 12:25 PM    Patient contacted post ED visit, second outreach attempt made. Message was left for patient to return a call to the VBI Department at [facility]: Phone [phone].    Thank you.  Kathleen Chávez MA  [facility]

## 2025-01-08 NOTE — TELEPHONE ENCOUNTER
01/08/25 12:28 PM    Patient contacted post ED visit, VBI department spoke with patient/caregiver and outreach was successful.    Thank you.  Kathleen Chávez MA  [facility]

## 2025-01-15 DIAGNOSIS — Z98.1 S/P LUMBAR FUSION: ICD-10-CM

## 2025-01-16 ENCOUNTER — TELEPHONE (OUTPATIENT)
Age: 70
End: 2025-01-16

## 2025-01-16 ENCOUNTER — HOSPITAL ENCOUNTER (EMERGENCY)
Facility: HOSPITAL | Age: 70
Discharge: HOME | End: 2025-01-16
Attending: EMERGENCY MEDICINE
Payer: COMMERCIAL

## 2025-01-16 VITALS
DIASTOLIC BLOOD PRESSURE: 68 MMHG | BODY MASS INDEX: 26.07 KG/M2 | HEIGHT: 68 IN | WEIGHT: 172 LBS | RESPIRATION RATE: 16 BRPM | OXYGEN SATURATION: 99 % | HEART RATE: 72 BPM | TEMPERATURE: 98.2 F | SYSTOLIC BLOOD PRESSURE: 123 MMHG

## 2025-01-16 DIAGNOSIS — E11.42 DIABETIC PERIPHERAL NEUROPATHY (HCC): ICD-10-CM

## 2025-01-16 DIAGNOSIS — M54.50 ACUTE EXACERBATION OF CHRONIC LOW BACK PAIN: Primary | ICD-10-CM

## 2025-01-16 DIAGNOSIS — G89.29 ACUTE EXACERBATION OF CHRONIC LOW BACK PAIN: Primary | ICD-10-CM

## 2025-01-16 DIAGNOSIS — L21.9 SEBORRHEIC DERMATITIS: Primary | ICD-10-CM

## 2025-01-16 DIAGNOSIS — Z98.1 S/P LUMBAR FUSION: ICD-10-CM

## 2025-01-16 DIAGNOSIS — M54.16 LUMBAR RADICULOPATHY: ICD-10-CM

## 2025-01-16 PROCEDURE — 99283 EMERGENCY DEPT VISIT LOW MDM: CPT

## 2025-01-16 PROCEDURE — 96372 THER/PROPH/DIAG INJ SC/IM: CPT

## 2025-01-16 PROCEDURE — 99284 EMERGENCY DEPT VISIT MOD MDM: CPT | Performed by: EMERGENCY MEDICINE

## 2025-01-16 RX ORDER — GABAPENTIN 400 MG/1
400 CAPSULE ORAL 2 TIMES DAILY
Qty: 60 CAPSULE | Refills: 0 | OUTPATIENT
Start: 2025-01-16

## 2025-01-16 RX ORDER — PREDNISONE 10 MG/1
TABLET ORAL
Refills: 0 | OUTPATIENT
Start: 2025-01-16

## 2025-01-16 RX ORDER — OXYCODONE AND ACETAMINOPHEN 5; 325 MG/1; MG/1
1 TABLET ORAL ONCE
Refills: 0 | Status: COMPLETED | OUTPATIENT
Start: 2025-01-16 | End: 2025-01-16

## 2025-01-16 RX ORDER — OXYCODONE AND ACETAMINOPHEN 5; 325 MG/1; MG/1
1 TABLET ORAL EVERY 6 HOURS PRN
Qty: 12 TABLET | Refills: 0 | Status: SHIPPED | OUTPATIENT
Start: 2025-01-16 | End: 2025-01-26

## 2025-01-16 RX ORDER — KETOCONAZOLE 20 MG/ML
1 SHAMPOO, SUSPENSION TOPICAL 2 TIMES WEEKLY
Qty: 120 ML | Refills: 1 | Status: SHIPPED | OUTPATIENT
Start: 2025-01-16

## 2025-01-16 RX ORDER — KETOROLAC TROMETHAMINE 30 MG/ML
30 INJECTION, SOLUTION INTRAMUSCULAR; INTRAVENOUS ONCE
Status: COMPLETED | OUTPATIENT
Start: 2025-01-16 | End: 2025-01-16

## 2025-01-16 RX ORDER — GABAPENTIN 400 MG/1
400 CAPSULE ORAL 2 TIMES DAILY
Qty: 60 CAPSULE | Refills: 0 | Status: SHIPPED | OUTPATIENT
Start: 2025-01-16

## 2025-01-16 RX ORDER — LIDOCAINE 50 MG/G
1 PATCH TOPICAL DAILY
Qty: 30 PATCH | Refills: 0 | Status: SHIPPED | OUTPATIENT
Start: 2025-01-16

## 2025-01-16 RX ORDER — METHOCARBAMOL 750 MG/1
TABLET, FILM COATED ORAL
Qty: 60 TABLET | Refills: 0 | OUTPATIENT
Start: 2025-01-16

## 2025-01-16 RX ORDER — METHOCARBAMOL 750 MG/1
750 TABLET, FILM COATED ORAL EVERY 6 HOURS PRN
Qty: 60 TABLET | Refills: 0 | Status: SHIPPED | OUTPATIENT
Start: 2025-01-16 | End: 2025-01-20 | Stop reason: ALTCHOICE

## 2025-01-16 RX ORDER — KETOCONAZOLE 20 MG/ML
SHAMPOO, SUSPENSION TOPICAL DAILY
Refills: 0 | OUTPATIENT
Start: 2025-01-16

## 2025-01-16 RX ORDER — LIDOCAINE 50 MG/G
1 PATCH TOPICAL ONCE
Status: DISCONTINUED | OUTPATIENT
Start: 2025-01-16 | End: 2025-01-17 | Stop reason: HOSPADM

## 2025-01-16 RX ADMIN — OXYCODONE HYDROCHLORIDE AND ACETAMINOPHEN 1 TABLET: 5; 325 TABLET ORAL at 22:50

## 2025-01-16 RX ADMIN — KETOROLAC TROMETHAMINE 30 MG: 30 INJECTION, SOLUTION INTRAMUSCULAR; INTRAVENOUS at 22:49

## 2025-01-16 RX ADMIN — LIDOCAINE 5% 1 PATCH: 700 PATCH TOPICAL at 22:49

## 2025-01-16 NOTE — TELEPHONE ENCOUNTER
Forwarding to PA team for review.     Detail Level: Detailed General Sunscreen Counseling: I recommended a broad spectrum sunscreen with a SPF of 30 or higher.  I explained that SPF 30 sunscreens block approximately 97 percent of the sun's harmful rays.  Sunscreens should be applied at least 15 minutes prior to expected sun exposure and then every 2 hours after that as long as sun exposure continues. If swimming or exercising sunscreen should be reapplied every 45 minutes to an hour after getting wet or sweating.  One ounce, or the equivalent of a shot glass full of sunscreen, is adequate to protect the skin not covered by a bathing suit. I also recommended a lip balm with a sunscreen as well. Sun protective clothing can be used in lieu of sunscreen but must be worn the entire time you are exposed to the sun's rays.

## 2025-01-16 NOTE — TELEPHONE ENCOUNTER
Please let Juan know that I understand the prednisone helps with the pain, but he will be having surgery next week and steroid use prior to surgery can cause surgical complications such as hypertension, elevated blood sugar, increased risk for infection, delayed wound healing, and fluid retention. I recommend he reach out to his Neurosurgeon.

## 2025-01-16 NOTE — TELEPHONE ENCOUNTER
Called pt and his wife said his surgery got canceled because of insurance issue. They don't know the next surgery date yet.

## 2025-01-16 NOTE — TELEPHONE ENCOUNTER
Reason for call:   [x] Prior Auth  [] Other:     Caller:  [] Patient  [x] Pharmacy  [id number] - [place], [place] - [address]         Ordering Provider:   [x] PCP/Provider - Zuleyma KNOTT / [place]

## 2025-01-17 ENCOUNTER — VBI (OUTPATIENT)
Dept: FAMILY MEDICINE CLINIC | Facility: CLINIC | Age: 70
End: 2025-01-17

## 2025-01-17 NOTE — LETTER
[facility]  [address]  [place] [id number] [phone]    Date: 01/23/25    Juan San  [address]  [place] [address]    Dear Juan:                                                                                                                                Thank you for choosing [facility] emergency department for care.  Your primary care provider wants to make sure that your ongoing medical care is being addressed. If you require follow up care as a result of your emergency department visit, there are a few things the practice would like you to know.                As part of the network's continuing commitment to caring for our patients, we have added more same day appointments and have extended office hours to meet your medical needs. After hours, on-call physicians are available via your primary care provider's main office line.               We encourage you to contact our office prior to seeking treatment to discuss your symptoms with the medical staff.  Together, we can determine the correct course of action.  A majority of non-emergent conditions such as: common cold, flu-like symptoms, fevers, strains/sprains, dislocations, minor burns, cuts and animal bites can be treated at [facility] facilities. Diagnostic testing is available at some sites.               Of course, if you are experiencing a life threatening medical emergency call 911 or proceed directly to the nearest emergency room.    Your nearest [facility] facility is conveniently located at:    [facility]  [address] [place] [address]  [phone]  SKIP THE WAIT  Conveniently offered at most [facility] locations  [place] your spot online at [email or web address] or on the [facility]    Sincerely,    [facility]  Dept: [phone]

## 2025-01-17 NOTE — ED PROVIDER NOTES
Time reflects when diagnosis was documented in both MDM as applicable and the Disposition within this note       Time User Action Codes Description Comment    1/16/2025 11:06 PM Carlo Henderson [M54.50,  G89.29] Acute exacerbation of chronic low back pain           ED Disposition       ED Disposition   Discharge    Condition   Stable    Date/Time   Thu Jan 16, 2025 11:04 PM    Comment   Juan San discharge to home/self care.                   Assessment & Plan       Medical Decision Making  69-year-old male in ED with complaint of acute exacerbation of chronic back pain.  Patient with no new neurologic deficits at this time.  I manage his pain with IM Toradol, Lidoderm patch and 1 Percocet.  Will discharge patient to home with a prescription for Lidoderm patches and Percocet.  I strongly recommended outpatient follow-up with pain and spine physicians.  Patient is in agreement with this current treatment plan.    Risk  Prescription drug management.             Medications   lidocaine (LIDODERM) 5 % patch 1 patch (1 patch Topical Medication Applied 1/16/25 2249)   ketorolac (TORADOL) injection 30 mg (30 mg Intramuscular Given 1/16/25 2249)   oxyCODONE-acetaminophen (PERCOCET) 5-325 mg per tablet 1 tablet (1 tablet Oral Given 1/16/25 2250)       ED Risk Strat Scores                          SBIRT 20yo+      Flowsheet Row Most Recent Value   Initial Alcohol Screen: US AUDIT-C     1. How often do you have a drink containing alcohol? 0 Filed at: 01/16/2025 2250   2. How many drinks containing alcohol do you have on a typical day you are drinking?  0 Filed at: 01/16/2025 2250   3a. Male UNDER 65: How often do you have five or more drinks on one occasion? 0 Filed at: 01/16/2025 2250   3b. FEMALE Any Age, or MALE 65+: How often do you have 4 or more drinks on one occassion? 0 Filed at: 01/16/2025 2250   Audit-C Score 0 Filed at: 01/16/2025 2250   JARON: How many times in the past year have you...    Used an  illegal drug or used a prescription medication for non-medical reasons? Never Filed at: 2025 3867                            History of Present Illness       Chief Complaint   Patient presents with    Back Pain     Pt reports severe lower back pain that started last night. Pt states that the pain is worse on the L side and reports that the pain radiates down both of his legs. Pt denies any injury.        Past Medical History:   Diagnosis Date    Allergic     Anemia     Anxiety     Arthritis     Cancer (HCC)     Chronic back pain     Depression     Diabetes mellitus (HCC)     Hypertension     Liver disease     Mitral valve prolapse     Peripheral neuropathy     Neuropathy    PONV (postoperative nausea and vomiting)     Thyroid disease       Past Surgical History:   Procedure Laterality Date    COLONOSCOPY      INCISION AND DRAINAGE OF WOUND Left 2022    Procedure: INCISION AND DRAINAGE (I&D) EXTREMITY;  Surgeon: Moustapha Cote DPM;  Location:  MAIN OR;  Service: Podiatry    IR BIOPSY SPINE  2024    IR BIOPSY SPINE  2024    IR PICC PLACEMENT SINGLE LUMEN  2024    JOINT REPLACEMENT Left 2022    Left TSA    NE ARTHRODESIS POSTERIOR/PSTLAT TQ 1NTRSPC LUMBAR Bilateral 2023    Procedure: L1-S1 navigated posterior decompression with instrumented fixation fusion;  Surgeon: James Moraes MD;  Location:  MAIN OR;  Service: Neurosurgery    THYROID SURGERY      remove cancer      Family History   Problem Relation Age of Onset    Hypertension Father     Dementia Mother     Diabetes Mother     Colon cancer Neg Hx       Social History     Tobacco Use    Smoking status: Former     Current packs/day: 0.00     Average packs/day: 0.3 packs/day for 5.9 years (1.5 ttl pk-yrs)     Types: Cigarettes     Start date: 1975     Quit date: 1981     Years since quittin.6    Smokeless tobacco: Never    Tobacco comments:     quit ; smokes when in pain as of 26   Vaping Use     Vaping status: Former    Substances: THC, CBD   Substance Use Topics    Alcohol use: Not Currently     Alcohol/week: 4.0 - 7.0 standard drinks of alcohol     Types: 1 - 2 Glasses of wine, 2 - 3 Cans of beer, 1 - 2 Shots of liquor per week     Comment: only on special occasions    Drug use: Not Currently     Frequency: 7.0 times per week     Types: Marijuana     Comment: Medical Marijuana      E-Cigarette/Vaping    E-Cigarette Use Former User     Comments medical marijuana - occ       E-Cigarette/Vaping Substances    Nicotine No     THC Yes     CBD Yes     Flavoring No     Other No     Unknown No       I have reviewed and agree with the history as documented.     Patient is a 69-year-old male with a history of chronic back pain presents emergency department with complaint of exacerbation of same.  He localizes the worst of his pain to the left side of his back and states that he radiates down his left lower extremity.  He denies recent trauma.  He denies fevers or chills.  He denies bowel or bladder incontinence and saddle anesthesia.  Patient last took Tylenol at approximately 6 PM and ibuprofen at 1 PM for pain relief, without improvement.      History provided by:  Patient   used: No    Back Pain  Associated symptoms: no abdominal pain, no chest pain, no dysuria and no fever        Review of Systems   Constitutional:  Negative for chills and fever.   Respiratory:  Negative for cough, shortness of breath and wheezing.    Cardiovascular:  Negative for chest pain and palpitations.   Gastrointestinal:  Negative for abdominal pain, constipation, diarrhea, nausea and vomiting.   Genitourinary:  Negative for dysuria, flank pain, hematuria and urgency.   Musculoskeletal:  Positive for back pain and gait problem. Negative for joint swelling.   Skin:  Negative for color change and rash.   All other systems reviewed and are negative.          Objective       ED Triage Vitals [01/16/25 2105]   Temperature  Pulse Blood Pressure Respirations SpO2 Patient Position - Orthostatic VS   98.2 °F (36.8 °C) 72 123/68 16 99 % Sitting      Temp Source Heart Rate Source BP Location FiO2 (%) Pain Score    Oral Monitor Left arm -- 10 - Worst Possible Pain      Vitals      Date and Time Temp Pulse SpO2 Resp BP Pain Score FACES Pain Rating User   25 2249 -- -- -- -- -- 10 - Worst Possible Pain -- AM   25 2105 98.2 °F (36.8 °C) 72 99 % 16 123/68 10 - Worst Possible Pain -- RN            Physical Exam  Vitals and nursing note reviewed.   Constitutional:       Appearance: He is well-developed.   HENT:      Head: Normocephalic and atraumatic.   Eyes:      Pupils: Pupils are equal, round, and reactive to light.   Cardiovascular:      Rate and Rhythm: Normal rate and regular rhythm.      Heart sounds: Normal heart sounds.   Pulmonary:      Effort: Pulmonary effort is normal.      Breath sounds: Normal breath sounds.   Abdominal:      General: Bowel sounds are normal. There is no distension.      Palpations: Abdomen is soft. There is no mass.      Tenderness: There is no abdominal tenderness. There is no guarding or rebound.   Musculoskeletal:         General: Tenderness present. No swelling or deformity.      Cervical back: Normal range of motion and neck supple.   Skin:     General: Skin is warm and dry.      Capillary Refill: Capillary refill takes less than 2 seconds.   Neurological:      General: No focal deficit present.      Mental Status: He is alert and oriented to person, place, and time.   Psychiatric:         Behavior: Behavior normal.         Thought Content: Thought content normal.         Judgment: Judgment normal.         Results Reviewed       None            No orders to display       Procedures    ED Medication and Procedure Management   Prior to Admission Medications   Prescriptions Last Dose Informant Patient Reported? Taking?   ACCU-CHEK FABIANO PLUS test strip  Spouse/Significant Other Yes No   Si (four)  times a day   ACCU-CHEK FASTCLIX LANCETS MISC  Spouse/Significant Other Yes No   Si (four) times a day   CANNABIDIOL PO  Spouse/Significant Other Yes No   Sig: medical marijuana as needed per latest dci  Indications: .   Jardiance 10 MG TABS  Spouse/Significant Other Yes No   Sig: Take 10 mg by mouth every morning   Lactobacillus Rhamnosus, GG, (Culturelle) CAPS  Spouse/Significant Other Yes No   Sig: Take 1 capsule by mouth daily   NIFEdipine (ADALAT CC) 90 mg 24 hr tablet  Spouse/Significant Other Yes No   Sig: Take 90 mg by mouth daily   NIFEdipine (PROCARDIA XL) 30 mg 24 hr tablet   No No   Sig: Take 2 tablets (60 mg total) by mouth 2 (two) times a day   acetaminophen (TYLENOL) 500 mg tablet  Spouse/Significant Other No No   Sig: Take 2 tablets (1,000 mg total) by mouth every 8 (eight) hours 500 mg tablet   busPIRone (BUSPAR) 5 mg tablet  Spouse/Significant Other Yes No   Sig: Take 5 mg by mouth 2 (two) times a day as needed   clonazePAM (KlonoPIN) 1 mg tablet   No No   Sig: Take 1 tablet (1 mg total) by mouth 2 (two) times a day & 3rd pill PRN for anxiety   doxycycline hyclate (VIBRAMYCIN) 100 mg capsule  Spouse/Significant Other Yes No   Sig: Take 100 mg by mouth 2 (two) times a day Do not start before August 10, 2024.   escitalopram (Lexapro) 20 mg tablet  Spouse/Significant Other Yes No   Sig: Take 20 mg by mouth daily. Indications: depression   finasteride (PROSCAR) 5 mg tablet  Spouse/Significant Other No No   Sig: TAKE 1 TABLET BY MOUTH DAILY   folic acid (FOLVITE) 1 mg tablet  Spouse/Significant Other Yes No   Sig: Take 2,000 mcg by mouth daily   furosemide (LASIX) 20 mg tablet  Spouse/Significant Other Yes No   Sig: Take 20 mg by mouth. take 1 tab by mouth daily as needed for weight gain more than 3lbs overnight or more than 5lb in a week, or LE swelling.   gabapentin (NEURONTIN) 400 mg capsule   No No   Sig: Take 1 capsule (400 mg total) by mouth 2 (two) times a day   hydrOXYzine pamoate (VISTARIL)  50 mg capsule  Spouse/Significant Other Yes No   Sig: Take 50 mg by mouth 2 (two) times a day. Indications: Feeling Anxious   ketoconazole (NIZORAL) 2 % shampoo   No No   Sig: Apply 1 Application topically 2 (two) times a week Use as directed   lidocaine (LIDODERM) 5 %  Spouse/Significant Other No No   Sig: Apply 1 patch topically over 12 hours daily Remove & Discard patch within 12 hours or as directed by MD   lovastatin (MEVACOR) 20 mg tablet   No No   Sig: Take 1 tablet (20 mg total) by mouth every morning   metFORMIN (GLUCOPHAGE) 1000 MG tablet   No No   Sig: Take 1 tablet (1,000 mg total) by mouth 2 (two) times a day with meals   methocarbamol (ROBAXIN) 750 mg tablet   No No   Sig: Take 1 tablet (750 mg total) by mouth every 6 (six) hours as needed for muscle spasms   metoclopramide (REGLAN) 10 mg tablet  Spouse/Significant Other Yes No   Sig: Take 10 mg by mouth 2 (two) times a day before meals   metoprolol succinate (TOPROL-XL) 50 mg 24 hr tablet  Spouse/Significant Other Yes No   Sig: Take 50 mg by mouth 2 (two) times a day   mirtazapine (REMERON) 45 MG tablet   No No   Sig: Take 1 tablet (45 mg total) by mouth daily at bedtime   naloxone (NARCAN) 4 mg/0.1 mL nasal spray  Spouse/Significant Other Yes No   Si mg into each nostril   ondansetron (Zofran ODT) 4 mg disintegrating tablet  Spouse/Significant Other No No   Sig: Take 1 tablet (4 mg total) by mouth every 6 (six) hours as needed for nausea or vomiting   patient supplied medication  Spouse/Significant Other Yes No   Sig: medical marijuana vape as needed per latest dci  Indications: .   polyethylene glycol (MIRALAX) 17 g packet  Spouse/Significant Other Yes No   Sig: Take 17 g by mouth   predniSONE 10 mg tablet  Spouse/Significant Other Yes No   Sig: Take 4 tabs x3 days, 3 tabs x3 days, 2 tabs x3 days, 1 tab x3 days   senna (SENOKOT) 8.6 mg  Spouse/Significant Other No No   Sig: Take 1 tablet (8.6 mg total) by mouth 2 (two) times a day 2 tabs at  bedtime as needed for constipation per latest dci   tamsulosin (FLOMAX) 0.4 mg  Spouse/Significant Other No No   Sig: TAKE 1 CAPSULE BY MOUTH DAILY WITH DINNER   triamcinolone (KENALOG) 0.1 % ointment   No No   Sig: Apply topically 2 (two) times a day      Facility-Administered Medications: None     Discharge Medication List as of 1/16/2025 11:13 PM        START taking these medications    Details   oxyCODONE-acetaminophen (Percocet) 5-325 mg per tablet Take 1 tablet by mouth every 6 (six) hours as needed for moderate pain for up to 10 days Max Daily Amount: 4 tablets, Starting Thu 1/16/2025, Until Sun 1/26/2025 at 2359, Normal           CONTINUE these medications which have NOT CHANGED    Details   methocarbamol (ROBAXIN) 750 mg tablet Take 1 tablet (750 mg total) by mouth every 6 (six) hours as needed for muscle spasms, Starting Thu 1/16/2025, Normal      ACCU-CHEK FABIANO PLUS test strip 4 (four) times a day, Starting Mon 12/17/2018, Historical Med      ACCU-CHEK FASTCLIX LANCETS MISC 4 (four) times a day, Starting Sat 12/22/2018, Historical Med      acetaminophen (TYLENOL) 500 mg tablet Take 2 tablets (1,000 mg total) by mouth every 8 (eight) hours 500 mg tablet, Starting Tue 5/21/2024, No Print      busPIRone (BUSPAR) 5 mg tablet Take 5 mg by mouth 2 (two) times a day as needed, Starting Mon 9/23/2024, Historical Med      CANNABIDIOL PO medical marijuana as needed per latest dci  Indications: ., Historical Med      clonazePAM (KlonoPIN) 1 mg tablet Take 1 tablet (1 mg total) by mouth 2 (two) times a day & 3rd pill PRN for anxiety, Starting Thu 1/2/2025, Normal      doxycycline hyclate (VIBRAMYCIN) 100 mg capsule Take 100 mg by mouth 2 (two) times a day Do not start before August 10, 2024., Starting Sat 8/10/2024, Historical Med      escitalopram (Lexapro) 20 mg tablet Take 20 mg by mouth daily. Indications: depression, Starting Wed 5/29/2024, Historical Med      finasteride (PROSCAR) 5 mg tablet TAKE 1 TABLET BY  MOUTH DAILY, Starting Fri 7/26/2024, Normal      folic acid (FOLVITE) 1 mg tablet Take 2,000 mcg by mouth daily, Starting Mon 12/17/2018, Historical Med      furosemide (LASIX) 20 mg tablet Take 20 mg by mouth. take 1 tab by mouth daily as needed for weight gain more than 3lbs overnight or more than 5lb in a week, or LE swelling., Historical Med      gabapentin (NEURONTIN) 400 mg capsule Take 1 capsule (400 mg total) by mouth 2 (two) times a day, Starting Thu 1/16/2025, Normal      hydrOXYzine pamoate (VISTARIL) 50 mg capsule Take 50 mg by mouth 2 (two) times a day. Indications: Feeling Anxious, Historical Med      Jardiance 10 MG TABS Take 10 mg by mouth every morning, Starting Wed 7/20/2022, Historical Med      ketoconazole (NIZORAL) 2 % shampoo Apply 1 Application topically 2 (two) times a week Use as directed, Starting Thu 1/16/2025, Normal      Lactobacillus Rhamnosus, GG, (Culturelle) CAPS Take 1 capsule by mouth daily, Starting Tue 7/30/2024, Historical Med      lidocaine (LIDODERM) 5 % Apply 1 patch topically over 12 hours daily Remove & Discard patch within 12 hours or as directed by MD, Starting Tue 2/27/2024, Normal      lovastatin (MEVACOR) 20 mg tablet Take 1 tablet (20 mg total) by mouth every morning, Starting Thu 1/2/2025, Normal      metFORMIN (GLUCOPHAGE) 1000 MG tablet Take 1 tablet (1,000 mg total) by mouth 2 (two) times a day with meals, Starting Fri 12/20/2024, Normal      metoclopramide (REGLAN) 10 mg tablet Take 10 mg by mouth 2 (two) times a day before meals, Starting Sat 11/16/2024, Historical Med      metoprolol succinate (TOPROL-XL) 50 mg 24 hr tablet Take 50 mg by mouth 2 (two) times a day, Starting Wed 4/3/2024, Historical Med      mirtazapine (REMERON) 45 MG tablet Take 1 tablet (45 mg total) by mouth daily at bedtime, Starting Thu 1/2/2025, Normal      naloxone (NARCAN) 4 mg/0.1 mL nasal spray 4 mg into each nostril, Starting Tue 6/18/2024, Historical Med      NIFEdipine (ADALAT CC)  90 mg 24 hr tablet Take 90 mg by mouth daily, Starting Wed 11/20/2024, Historical Med      ondansetron (Zofran ODT) 4 mg disintegrating tablet Take 1 tablet (4 mg total) by mouth every 6 (six) hours as needed for nausea or vomiting, Starting Tue 5/21/2024, Normal      patient supplied medication medical marijuana vape as needed per latest dci  Indications: ., Historical Med      polyethylene glycol (MIRALAX) 17 g packet Take 17 g by mouth, Starting Tue 6/18/2024, Historical Med      predniSONE 10 mg tablet Take 4 tabs x3 days, 3 tabs x3 days, 2 tabs x3 days, 1 tab x3 days, Historical Med      senna (SENOKOT) 8.6 mg Take 1 tablet (8.6 mg total) by mouth 2 (two) times a day 2 tabs at bedtime as needed for constipation per latest dci, Starting Tue 5/21/2024, No Print      tamsulosin (FLOMAX) 0.4 mg TAKE 1 CAPSULE BY MOUTH DAILY WITH DINNER, Starting Tue 8/13/2024, Normal      triamcinolone (KENALOG) 0.1 % ointment Apply topically 2 (two) times a day, Starting Tue 12/17/2024, Normal           No discharge procedures on file.  ED SEPSIS DOCUMENTATION   Time reflects when diagnosis was documented in both MDM as applicable and the Disposition within this note       Time User Action Codes Description Comment    1/16/2025 11:06 PM Carlo Henderson [M54.50,  G89.29] Acute exacerbation of chronic low back pain                  Carlo Henderson DO  01/16/25 [id number]

## 2025-01-17 NOTE — TELEPHONE ENCOUNTER
01/17/25 12:51 PM    Patient contacted post ED visit, first outreach attempt made. Message was left for patient to return a call to the VBI Department at [facility]: Phone [phone].    Thank you.  Kathleen Chávez MA  [facility]

## 2025-01-17 NOTE — DISCHARGE INSTRUCTIONS
Return to the ER for further concerns or worsening symptoms  Follow up with your primary care physician and Pain/Spine in 1-2 days  Take medication as prescribed

## 2025-01-20 ENCOUNTER — TELEPHONE (OUTPATIENT)
Dept: FAMILY MEDICINE CLINIC | Facility: CLINIC | Age: 70
End: 2025-01-20

## 2025-01-20 DIAGNOSIS — M62.838 MUSCLE SPASM: ICD-10-CM

## 2025-01-20 DIAGNOSIS — M54.16 LUMBAR RADICULOPATHY: Primary | ICD-10-CM

## 2025-01-20 RX ORDER — CYCLOBENZAPRINE HCL 5 MG
5 TABLET ORAL 3 TIMES DAILY PRN
Qty: 60 TABLET | Refills: 0 | Status: SHIPPED | OUTPATIENT
Start: 2025-01-20

## 2025-01-20 NOTE — TELEPHONE ENCOUNTER
Called and left VM requesting further information. Prior auth needed for methocarbamol - suggesting switching to flexeril or tizanidine. Pt prescribed both medications in past. Unable to determine effectiveness through chart review. Requesting more information from pt and spouse.

## 2025-01-20 NOTE — TELEPHONE ENCOUNTER
Patient Spouse called reports patient has taken Flexeril in past and has worked. Requesting for alternative to be prescribed.        Please review.  Thank you

## 2025-01-20 NOTE — PROGRESS NOTES
Prior auth needed for robaxin. Requesting alternate medication. Has tolerated flexeril in the past. Cyclobenzaprine 5 mg sent to pharmacy.  
Pt S/P dental work done 2 days agio C/O toothache for 2 days .

## 2025-01-20 NOTE — TELEPHONE ENCOUNTER
Insurance is requesting medication to be switched to Flexeril or Tizanidine. Is patient able to take either of those, or would provider like to proceed with the PA?   Patient wanting to know about MRI results for lower back right side.

## 2025-01-21 DIAGNOSIS — I10 PRIMARY HYPERTENSION: ICD-10-CM

## 2025-01-22 RX ORDER — NIFEDIPINE 30 MG/1
30 TABLET, EXTENDED RELEASE ORAL DAILY
Qty: 90 TABLET | Refills: 1 | Status: SHIPPED | OUTPATIENT
Start: 2025-01-22 | End: 2025-07-21

## 2025-01-22 NOTE — TELEPHONE ENCOUNTER
01/22/25 12:32 PM    Patient contacted post ED visit, second outreach attempt made. Message was left for patient to return a call to the VBI Department at [facility]: Phone [phone].    Thank you.  Kathleen Chávez MA  [facility]

## 2025-01-23 NOTE — TELEPHONE ENCOUNTER
01/23/25 2:00 PM    Patient contacted post ED visit, third outreach attempt made. Message was left for patient to return a call to either the VBI Department at [facility]: Phone [phone] or the PCP office.     Thank you.  Kathleen Chávez MA  [facility]      01/23/25 2:01 PM    Patient contacted post ED visit, phone outreaches were unsuccessful and a MyChart letter has been sent to the patient as follow-up.    Thank you.  Kathleen Chávez MA  [facility]

## 2025-02-03 DIAGNOSIS — R33.9 URINARY RETENTION: ICD-10-CM

## 2025-02-04 RX ORDER — FINASTERIDE 5 MG/1
5 TABLET, FILM COATED ORAL DAILY
Qty: 100 TABLET | Refills: 0 | Status: ON HOLD | OUTPATIENT
Start: 2025-02-04

## 2025-02-05 ENCOUNTER — TELEPHONE (OUTPATIENT)
Dept: FAMILY MEDICINE CLINIC | Facility: CLINIC | Age: 70
End: 2025-02-05

## 2025-02-05 DIAGNOSIS — G89.29 CHRONIC BILATERAL LOW BACK PAIN WITHOUT SCIATICA: Primary | ICD-10-CM

## 2025-02-05 DIAGNOSIS — M54.50 CHRONIC BILATERAL LOW BACK PAIN WITHOUT SCIATICA: Primary | ICD-10-CM

## 2025-02-05 RX ORDER — TRAMADOL HYDROCHLORIDE 50 MG/1
50 TABLET ORAL EVERY 8 HOURS PRN
Qty: 42 TABLET | Refills: 0 | Status: ON HOLD | OUTPATIENT
Start: 2025-02-05 | End: 2025-02-19

## 2025-02-05 NOTE — PROGRESS NOTES
Pt with chronic back pain following with [facility] requesting pain medication refill. Surgery delayed - March - d/t insurance issues. PDMP reviewed. Tramadol 50 mg to pharmacy. Pt instructed to limit use.

## 2025-02-10 ENCOUNTER — TELEPHONE (OUTPATIENT)
Age: 70
End: 2025-02-10

## 2025-02-10 DIAGNOSIS — F41.9 ANXIETY AND DEPRESSION: Primary | ICD-10-CM

## 2025-02-10 DIAGNOSIS — F32.A ANXIETY AND DEPRESSION: Primary | ICD-10-CM

## 2025-02-10 RX ORDER — HYDROXYZINE PAMOATE 50 MG/1
50 CAPSULE ORAL 2 TIMES DAILY
Qty: 60 CAPSULE | Refills: 3 | Status: SHIPPED | OUTPATIENT
Start: 2025-02-10

## 2025-02-10 NOTE — TELEPHONE ENCOUNTER
PA for hydrOXYzine pamoate (VISTARIL) 50 mg capsule SUBMITTED to [facility]    via    []CMM-KEY:   [x]Surescripts-Case ID # [id number]  []Availity-Auth ID # NDC #   []Faxed to plan   []Other website   []Phone call Case ID #    [x]PA sent as URGENT    All office notes, labs and other pertaining documents and studies sent. Clinical questions answered. Awaiting determination from insurance company.     Turnaround time for your insurance to make a decision on your Prior Authorization can take 7-21 business days.

## 2025-02-10 NOTE — TELEPHONE ENCOUNTER
Reason for call: Patient requesting PA for medication. This was previously ordered by Dr. Sabra Magaña. Will need order from current PCP. Patient has no more medication.  [x] Prior Auth  [] Other:     Caller:  [x] Patient  [] Pharmacy  Name:   Address:   Callback Number:     Medication: hydrOXYzine pamoate (VISTARIL) 50 mg capsule     Dose/Frequency:  Take 50 mg by mouth 2 (two) times a day     Quantity: historical    Ordering Provider:   [x] PCP/Provider - previous PCP  [] Speciality/Provider -     Has the patient tried other medications and failed? If failed, which medications did they fail?    [] No   [] Yes -     Is the patient's insurance updated in EPIC?   [x] Yes   [] No     Is a copy of the patient's insurance scanned in EPIC?   [x] Yes   [] No

## 2025-02-10 NOTE — TELEPHONE ENCOUNTER
Once the medication is ordered by the new PCP, the PA team can start a PA. Please let PA team know when provider places the script.

## 2025-02-11 DIAGNOSIS — M54.16 LUMBAR RADICULOPATHY: ICD-10-CM

## 2025-02-11 DIAGNOSIS — E11.42 DIABETIC PERIPHERAL NEUROPATHY (HCC): ICD-10-CM

## 2025-02-11 RX ORDER — GABAPENTIN 400 MG/1
400 CAPSULE ORAL 2 TIMES DAILY
Qty: 60 CAPSULE | Refills: 5 | Status: SHIPPED | OUTPATIENT
Start: 2025-02-11

## 2025-02-11 NOTE — TELEPHONE ENCOUNTER
PA for hydrOXYzine pamoate (VISTARIL) 50 mg capsule APPROVED     Date(s) approved 01/01/25-12/31/25    Case # [id number]    Patient advised by          []i-Nalysishart Message  []Phone call   [x]LMOM  []L/M to call office as no active Communication consent on file  []Unable to leave detailed message as VM not approved on Communication consent       Pharmacy advised by    [x]Fax  []Phone call    Approval letter scanned into Media Yes

## 2025-02-11 NOTE — TELEPHONE ENCOUNTER
A prior authorization has been started for hydrOXYzine Pamoate 50 mg capsules.  Thank you     KEY:  [id number]

## 2025-02-13 ENCOUNTER — TELEPHONE (OUTPATIENT)
Age: 70
End: 2025-02-13

## 2025-02-13 NOTE — TELEPHONE ENCOUNTER
Yao from [facility] was looking for PA for back surgery. I did not find anything and told him that patient would have to call us for surgery details to start PA along with Pre Op clearance if needed.    Thank you!!

## 2025-02-16 ENCOUNTER — HOSPITAL ENCOUNTER (INPATIENT)
Facility: HOSPITAL | Age: 70
LOS: 4 days | Discharge: NURSING, REHAB OR LONG-TERM CARE | DRG: 683 | End: 2025-02-20
Attending: EMERGENCY MEDICINE | Admitting: HOSPITALIST
Payer: COMMERCIAL

## 2025-02-16 DIAGNOSIS — F32.A ANXIETY AND DEPRESSION: ICD-10-CM

## 2025-02-16 DIAGNOSIS — E86.0 DEHYDRATION: ICD-10-CM

## 2025-02-16 DIAGNOSIS — G89.29 CHRONIC BILATERAL LOW BACK PAIN WITHOUT SCIATICA: ICD-10-CM

## 2025-02-16 DIAGNOSIS — N17.9 ACUTE KIDNEY INJURY (HCC): Primary | ICD-10-CM

## 2025-02-16 DIAGNOSIS — M54.50 CHRONIC BILATERAL LOW BACK PAIN WITHOUT SCIATICA: ICD-10-CM

## 2025-02-16 DIAGNOSIS — K20.90 ESOPHAGITIS: ICD-10-CM

## 2025-02-16 DIAGNOSIS — D64.9 ANEMIA, UNSPECIFIED TYPE: ICD-10-CM

## 2025-02-16 DIAGNOSIS — F41.9 ANXIETY AND DEPRESSION: ICD-10-CM

## 2025-02-16 DIAGNOSIS — R63.8 DECREASED ORAL INTAKE: ICD-10-CM

## 2025-02-16 LAB
2HR DELTA HS TROPONIN: 0 NG/L
4HR DELTA HS TROPONIN: -1 NG/L
ALBUMIN SERPL BCG-MCNC: 3.6 G/DL (ref 3.5–5)
ALP SERPL-CCNC: 55 U/L (ref 34–104)
ALT SERPL W P-5'-P-CCNC: 38 U/L (ref 7–52)
AMPHETAMINES SERPL QL SCN: NEGATIVE
ANION GAP SERPL CALCULATED.3IONS-SCNC: 14 MMOL/L (ref 4–13)
APAP SERPL-MCNC: 2 UG/ML (ref 10–20)
APTT PPP: 23 SECONDS (ref 23–34)
AST SERPL W P-5'-P-CCNC: 32 U/L (ref 13–39)
ATRIAL RATE: 82 BPM
BACTERIA UR QL AUTO: ABNORMAL /HPF
BARBITURATES UR QL: NEGATIVE
BASOPHILS # BLD AUTO: 0.05 THOUSANDS/ΜL (ref 0–0.1)
BASOPHILS NFR BLD AUTO: 1 % (ref 0–1)
BENZODIAZ UR QL: NEGATIVE
BILIRUB SERPL-MCNC: 0.42 MG/DL (ref 0.2–1)
BILIRUB UR QL STRIP: NEGATIVE
BUDDING YEAST: PRESENT
BUN SERPL-MCNC: 72 MG/DL (ref 5–25)
CALCIUM SERPL-MCNC: 8.9 MG/DL (ref 8.4–10.2)
CARDIAC TROPONIN I PNL SERPL HS: 5 NG/L (ref ?–50)
CARDIAC TROPONIN I PNL SERPL HS: 6 NG/L (ref ?–50)
CARDIAC TROPONIN I PNL SERPL HS: 6 NG/L (ref ?–50)
CHLORIDE SERPL-SCNC: 107 MMOL/L (ref 96–108)
CLARITY UR: CLEAR
CO2 SERPL-SCNC: 17 MMOL/L (ref 21–32)
COCAINE UR QL: NEGATIVE
COLOR UR: YELLOW
CREAT SERPL-MCNC: 2.56 MG/DL (ref 0.6–1.3)
EOSINOPHIL # BLD AUTO: 0.02 THOUSAND/ΜL (ref 0–0.61)
EOSINOPHIL NFR BLD AUTO: 0 % (ref 0–6)
ERYTHROCYTE [DISTWIDTH] IN BLOOD BY AUTOMATED COUNT: 18.9 % (ref 11.6–15.1)
ETHANOL SERPL-MCNC: <10 MG/DL
FENTANYL UR QL SCN: NEGATIVE
FLUAV AG UPPER RESP QL IA.RAPID: NEGATIVE
FLUBV AG UPPER RESP QL IA.RAPID: NEGATIVE
GFR SERPL CREATININE-BSD FRML MDRD: 24 ML/MIN/1.73SQ M
GLUCOSE SERPL-MCNC: 170 MG/DL (ref 65–140)
GLUCOSE SERPL-MCNC: 189 MG/DL (ref 65–140)
GLUCOSE UR STRIP-MCNC: ABNORMAL MG/DL
HCT VFR BLD AUTO: 29.9 % (ref 36.5–49.3)
HGB BLD-MCNC: 9.8 G/DL (ref 12–17)
HGB UR QL STRIP.AUTO: NEGATIVE
HYALINE CASTS #/AREA URNS LPF: ABNORMAL /LPF
HYDROCODONE UR QL SCN: NEGATIVE
IMM GRANULOCYTES # BLD AUTO: 0.08 THOUSAND/UL (ref 0–0.2)
IMM GRANULOCYTES NFR BLD AUTO: 1 % (ref 0–2)
INR PPP: 1.09 (ref 0.85–1.19)
KETONES UR STRIP-MCNC: NEGATIVE MG/DL
LACTATE SERPL-SCNC: 2.9 MMOL/L (ref 0.5–2)
LACTATE SERPL-SCNC: 3.2 MMOL/L (ref 0.5–2)
LEUKOCYTE ESTERASE UR QL STRIP: NEGATIVE
LYMPHOCYTES # BLD AUTO: 3.47 THOUSANDS/ΜL (ref 0.6–4.47)
LYMPHOCYTES NFR BLD AUTO: 36 % (ref 14–44)
MCH RBC QN AUTO: 25.7 PG (ref 26.8–34.3)
MCHC RBC AUTO-ENTMCNC: 32.8 G/DL (ref 31.4–37.4)
MCV RBC AUTO: 79 FL (ref 82–98)
METHADONE UR QL: NEGATIVE
MONOCYTES # BLD AUTO: 0.46 THOUSAND/ΜL (ref 0.17–1.22)
MONOCYTES NFR BLD AUTO: 5 % (ref 4–12)
NEUTROPHILS # BLD AUTO: 5.51 THOUSANDS/ΜL (ref 1.85–7.62)
NEUTS SEG NFR BLD AUTO: 57 % (ref 43–75)
NITRITE UR QL STRIP: NEGATIVE
NON-SQ EPI CELLS URNS QL MICRO: ABNORMAL /HPF
NRBC BLD AUTO-RTO: 3 /100 WBCS
OPIATES UR QL SCN: NEGATIVE
OTHER STN SPEC: ABNORMAL
OXYCODONE+OXYMORPHONE UR QL SCN: NEGATIVE
P AXIS: 56 DEGREES
PCP UR QL: NEGATIVE
PH UR STRIP.AUTO: 5.5 [PH]
PLATELET # BLD AUTO: 325 THOUSANDS/UL (ref 149–390)
PMV BLD AUTO: 10.9 FL (ref 8.9–12.7)
POTASSIUM SERPL-SCNC: 3.8 MMOL/L (ref 3.5–5.3)
PR INTERVAL: 120 MS
PROCALCITONIN SERPL-MCNC: 0.31 NG/ML
PROT SERPL-MCNC: 5.7 G/DL (ref 6.4–8.4)
PROT UR STRIP-MCNC: ABNORMAL MG/DL
PROTHROMBIN TIME: 14.6 SECONDS (ref 12.3–15)
QRS AXIS: 66 DEGREES
QRSD INTERVAL: 88 MS
QT INTERVAL: 376 MS
QTC INTERVAL: 439 MS
RBC # BLD AUTO: 3.81 MILLION/UL (ref 3.88–5.62)
RBC #/AREA URNS AUTO: ABNORMAL /HPF
SALICYLATES SERPL-MCNC: <5 MG/DL (ref 3–20)
SARS-COV+SARS-COV-2 AG RESP QL IA.RAPID: NEGATIVE
SODIUM SERPL-SCNC: 138 MMOL/L (ref 135–147)
SP GR UR STRIP.AUTO: 1.01 (ref 1–1.03)
T WAVE AXIS: 196 DEGREES
T4 FREE SERPL-MCNC: 0.85 NG/DL (ref 0.61–1.12)
THC UR QL: POSITIVE
TSH SERPL DL<=0.05 MIU/L-ACNC: 6.24 UIU/ML (ref 0.45–4.5)
UROBILINOGEN UR STRIP-ACNC: <2 MG/DL
VENTRICULAR RATE: 82 BPM
WBC # BLD AUTO: 9.59 THOUSAND/UL (ref 4.31–10.16)
WBC #/AREA URNS AUTO: ABNORMAL /HPF
WBC CASTS URNS QL MICRO: ABNORMAL /LPF

## 2025-02-16 PROCEDURE — 99285 EMERGENCY DEPT VISIT HI MDM: CPT | Performed by: EMERGENCY MEDICINE

## 2025-02-16 PROCEDURE — 87154 CUL TYP ID BLD PTHGN 6+ TRGT: CPT | Performed by: EMERGENCY MEDICINE

## 2025-02-16 PROCEDURE — 84145 PROCALCITONIN (PCT): CPT | Performed by: EMERGENCY MEDICINE

## 2025-02-16 PROCEDURE — 84484 ASSAY OF TROPONIN QUANT: CPT | Performed by: EMERGENCY MEDICINE

## 2025-02-16 PROCEDURE — 81001 URINALYSIS AUTO W/SCOPE: CPT | Performed by: HOSPITALIST

## 2025-02-16 PROCEDURE — 80053 COMPREHEN METABOLIC PANEL: CPT | Performed by: EMERGENCY MEDICINE

## 2025-02-16 PROCEDURE — 99222 1ST HOSP IP/OBS MODERATE 55: CPT | Performed by: HOSPITALIST

## 2025-02-16 PROCEDURE — 87040 BLOOD CULTURE FOR BACTERIA: CPT | Performed by: EMERGENCY MEDICINE

## 2025-02-16 PROCEDURE — 83605 ASSAY OF LACTIC ACID: CPT | Performed by: EMERGENCY MEDICINE

## 2025-02-16 PROCEDURE — 83605 ASSAY OF LACTIC ACID: CPT | Performed by: HOSPITALIST

## 2025-02-16 PROCEDURE — 84439 ASSAY OF FREE THYROXINE: CPT | Performed by: EMERGENCY MEDICINE

## 2025-02-16 PROCEDURE — 85730 THROMBOPLASTIN TIME PARTIAL: CPT | Performed by: EMERGENCY MEDICINE

## 2025-02-16 PROCEDURE — 99284 EMERGENCY DEPT VISIT MOD MDM: CPT

## 2025-02-16 PROCEDURE — 85610 PROTHROMBIN TIME: CPT | Performed by: EMERGENCY MEDICINE

## 2025-02-16 PROCEDURE — 80143 DRUG ASSAY ACETAMINOPHEN: CPT | Performed by: EMERGENCY MEDICINE

## 2025-02-16 PROCEDURE — 84484 ASSAY OF TROPONIN QUANT: CPT | Performed by: HOSPITALIST

## 2025-02-16 PROCEDURE — 85025 COMPLETE CBC W/AUTO DIFF WBC: CPT | Performed by: EMERGENCY MEDICINE

## 2025-02-16 PROCEDURE — 82948 REAGENT STRIP/BLOOD GLUCOSE: CPT

## 2025-02-16 PROCEDURE — 93010 ELECTROCARDIOGRAM REPORT: CPT | Performed by: INTERNAL MEDICINE

## 2025-02-16 PROCEDURE — 87804 INFLUENZA ASSAY W/OPTIC: CPT | Performed by: EMERGENCY MEDICINE

## 2025-02-16 PROCEDURE — 96360 HYDRATION IV INFUSION INIT: CPT

## 2025-02-16 PROCEDURE — 80307 DRUG TEST PRSMV CHEM ANLYZR: CPT | Performed by: HOSPITALIST

## 2025-02-16 PROCEDURE — 82077 ASSAY SPEC XCP UR&BREATH IA: CPT | Performed by: EMERGENCY MEDICINE

## 2025-02-16 PROCEDURE — 87811 SARS-COV-2 COVID19 W/OPTIC: CPT | Performed by: EMERGENCY MEDICINE

## 2025-02-16 PROCEDURE — 93005 ELECTROCARDIOGRAM TRACING: CPT

## 2025-02-16 PROCEDURE — 80179 DRUG ASSAY SALICYLATE: CPT | Performed by: EMERGENCY MEDICINE

## 2025-02-16 PROCEDURE — 36415 COLL VENOUS BLD VENIPUNCTURE: CPT | Performed by: EMERGENCY MEDICINE

## 2025-02-16 PROCEDURE — 84443 ASSAY THYROID STIM HORMONE: CPT | Performed by: EMERGENCY MEDICINE

## 2025-02-16 RX ORDER — DOXYCYCLINE 100 MG/1
100 CAPSULE ORAL 2 TIMES DAILY
Status: DISCONTINUED | OUTPATIENT
Start: 2025-02-16 | End: 2025-02-21 | Stop reason: HOSPADM

## 2025-02-16 RX ORDER — LIDOCAINE 50 MG/G
1 PATCH TOPICAL DAILY
Status: DISCONTINUED | OUTPATIENT
Start: 2025-02-17 | End: 2025-02-21 | Stop reason: HOSPADM

## 2025-02-16 RX ORDER — FINASTERIDE 5 MG/1
5 TABLET, FILM COATED ORAL DAILY
Status: DISCONTINUED | OUTPATIENT
Start: 2025-02-17 | End: 2025-02-21 | Stop reason: HOSPADM

## 2025-02-16 RX ORDER — FOLIC ACID 1 MG/1
2000 TABLET ORAL DAILY
Status: DISCONTINUED | OUTPATIENT
Start: 2025-02-17 | End: 2025-02-21 | Stop reason: HOSPADM

## 2025-02-16 RX ORDER — PRAVASTATIN SODIUM 20 MG
20 TABLET ORAL
Status: DISCONTINUED | OUTPATIENT
Start: 2025-02-16 | End: 2025-02-21 | Stop reason: HOSPADM

## 2025-02-16 RX ORDER — PANTOPRAZOLE SODIUM 40 MG/10ML
40 INJECTION, POWDER, LYOPHILIZED, FOR SOLUTION INTRAVENOUS
Status: DISCONTINUED | OUTPATIENT
Start: 2025-02-17 | End: 2025-02-19

## 2025-02-16 RX ORDER — NIFEDIPINE 30 MG/1
90 TABLET, EXTENDED RELEASE ORAL DAILY
Status: DISCONTINUED | OUTPATIENT
Start: 2025-02-17 | End: 2025-02-17

## 2025-02-16 RX ORDER — BUSPIRONE HYDROCHLORIDE 5 MG/1
5 TABLET ORAL 2 TIMES DAILY PRN
Status: DISCONTINUED | OUTPATIENT
Start: 2025-02-16 | End: 2025-02-21 | Stop reason: HOSPADM

## 2025-02-16 RX ORDER — INSULIN LISPRO 100 [IU]/ML
1-6 INJECTION, SOLUTION INTRAVENOUS; SUBCUTANEOUS
Status: DISCONTINUED | OUTPATIENT
Start: 2025-02-16 | End: 2025-02-21 | Stop reason: HOSPADM

## 2025-02-16 RX ORDER — NIFEDIPINE 30 MG/1
30 TABLET, EXTENDED RELEASE ORAL DAILY
Status: DISCONTINUED | OUTPATIENT
Start: 2025-02-17 | End: 2025-02-17

## 2025-02-16 RX ORDER — ENOXAPARIN SODIUM 100 MG/ML
30 INJECTION SUBCUTANEOUS DAILY
Status: DISCONTINUED | OUTPATIENT
Start: 2025-02-17 | End: 2025-02-21 | Stop reason: HOSPADM

## 2025-02-16 RX ORDER — CYCLOBENZAPRINE HCL 5 MG
5 TABLET ORAL 3 TIMES DAILY PRN
Status: DISCONTINUED | OUTPATIENT
Start: 2025-02-16 | End: 2025-02-21 | Stop reason: HOSPADM

## 2025-02-16 RX ORDER — CLONAZEPAM 1 MG/1
1 TABLET ORAL 2 TIMES DAILY
Status: DISCONTINUED | OUTPATIENT
Start: 2025-02-16 | End: 2025-02-21 | Stop reason: HOSPADM

## 2025-02-16 RX ORDER — ESCITALOPRAM OXALATE 20 MG/1
20 TABLET ORAL DAILY
Status: DISCONTINUED | OUTPATIENT
Start: 2025-02-17 | End: 2025-02-21 | Stop reason: HOSPADM

## 2025-02-16 RX ORDER — HYDROXYZINE HYDROCHLORIDE 25 MG/1
50 TABLET, FILM COATED ORAL 2 TIMES DAILY
Status: DISCONTINUED | OUTPATIENT
Start: 2025-02-16 | End: 2025-02-21 | Stop reason: HOSPADM

## 2025-02-16 RX ORDER — SODIUM CHLORIDE, SODIUM LACTATE, POTASSIUM CHLORIDE, CALCIUM CHLORIDE 600; 310; 30; 20 MG/100ML; MG/100ML; MG/100ML; MG/100ML
50 INJECTION, SOLUTION INTRAVENOUS CONTINUOUS
Status: DISCONTINUED | OUTPATIENT
Start: 2025-02-16 | End: 2025-02-18

## 2025-02-16 RX ORDER — SENNOSIDES 8.6 MG
8.6 TABLET ORAL 2 TIMES DAILY
Status: DISCONTINUED | OUTPATIENT
Start: 2025-02-16 | End: 2025-02-21 | Stop reason: HOSPADM

## 2025-02-16 RX ORDER — GABAPENTIN 100 MG/1
200 CAPSULE ORAL 2 TIMES DAILY
Status: DISCONTINUED | OUTPATIENT
Start: 2025-02-16 | End: 2025-02-17

## 2025-02-16 RX ORDER — ACETAMINOPHEN 325 MG/1
975 TABLET ORAL EVERY 8 HOURS SCHEDULED
Status: DISCONTINUED | OUTPATIENT
Start: 2025-02-16 | End: 2025-02-21 | Stop reason: HOSPADM

## 2025-02-16 RX ORDER — POLYETHYLENE GLYCOL 3350 17 G/17G
17 POWDER, FOR SOLUTION ORAL DAILY
Status: DISCONTINUED | OUTPATIENT
Start: 2025-02-17 | End: 2025-02-21 | Stop reason: HOSPADM

## 2025-02-16 RX ORDER — TRAMADOL HYDROCHLORIDE 50 MG/1
50 TABLET ORAL EVERY 12 HOURS PRN
Status: DISPENSED | OUTPATIENT
Start: 2025-02-16 | End: 2025-02-19

## 2025-02-16 RX ORDER — METOPROLOL SUCCINATE 50 MG/1
50 TABLET, EXTENDED RELEASE ORAL 2 TIMES DAILY
Status: DISCONTINUED | OUTPATIENT
Start: 2025-02-16 | End: 2025-02-21 | Stop reason: HOSPADM

## 2025-02-16 RX ORDER — MIRTAZAPINE 15 MG/1
45 TABLET, FILM COATED ORAL
Status: DISCONTINUED | OUTPATIENT
Start: 2025-02-16 | End: 2025-02-21 | Stop reason: HOSPADM

## 2025-02-16 RX ADMIN — METOPROLOL SUCCINATE 50 MG: 50 TABLET, EXTENDED RELEASE ORAL at 22:15

## 2025-02-16 RX ADMIN — HYDROXYZINE HYDROCHLORIDE 50 MG: 25 TABLET, FILM COATED ORAL at 18:35

## 2025-02-16 RX ADMIN — GABAPENTIN 200 MG: 100 CAPSULE ORAL at 22:15

## 2025-02-16 RX ADMIN — ACETAMINOPHEN 975 MG: 325 TABLET, FILM COATED ORAL at 22:16

## 2025-02-16 RX ADMIN — SODIUM CHLORIDE, SODIUM LACTATE, POTASSIUM CHLORIDE, AND CALCIUM CHLORIDE 100 ML/HR: .6; .31; .03; .02 INJECTION, SOLUTION INTRAVENOUS at 18:37

## 2025-02-16 RX ADMIN — SODIUM CHLORIDE 1000 ML: 0.9 INJECTION, SOLUTION INTRAVENOUS at 16:24

## 2025-02-16 RX ADMIN — MIRTAZAPINE 45 MG: 15 TABLET, FILM COATED ORAL at 22:16

## 2025-02-16 RX ADMIN — PRAVASTATIN SODIUM 20 MG: 20 TABLET ORAL at 18:35

## 2025-02-16 RX ADMIN — DOXYCYCLINE 100 MG: 100 CAPSULE ORAL at 18:35

## 2025-02-16 RX ADMIN — STANDARDIZED SENNA CONCENTRATE 8.6 MG: 8.6 TABLET ORAL at 18:35

## 2025-02-16 NOTE — ASSESSMENT & PLAN NOTE
Pt has hx of multiple spinal issues including discitis   Cont suppressive doxycycline  Cont to follow up w [facility]

## 2025-02-16 NOTE — H&P
H&P - Hospitalist   Name: Juan San 69 y.o. male I MRN: [id number]  Unit/Bed#: ED 11 I Date of Admission: 2/16/2025   Date of Service: 2/16/2025 I Hospital Day: 0     Assessment & Plan  Acute kidney injury (HCC)  Avoid hypotension, nephrotoxins, and NSAIDS if possible    IV fluids  Baseline Cr <1. Cr on presentation 2.56, markedly elevated from baseline  Bolused 1 L in the ER  Recheck Cr in the AM  If persists consult nephrology, check renal US    Lumbar spondylosis  Pt has hx of multiple spinal issues including discitis   Cont suppressive doxycycline  Cont to follow up w [facility]  Hypertension  Cont antihypertensives with hold parameters.  Decreased oral intake  Pt has non tender abdomen   Pt is using medical marijuana which in past was thought to be contributing to GI sx  Will give IV PPI  Consult GI  Diet as tolerated   Pt has NSAID usage in setting of his back issues which I have avoid discontinuing and describes epigastric pain/burning         Chief Complaint   Patient presents with    Failure To Thrive     Pt coming from home. Pt reports daughter is concerned for lack of nutrition the last few weeks. Pt reports losing both his brothers 2 weeks ago on the same day        HPI:  Juan San is a 69 y.o. male who presents with decreased oral intake. Pt has not been eating well for the last 2 weeks. Also reports depression related to health and passing of family member. Pt has chronic back pain and is apparently due for another back surgery in a few weeks at [facility]. Pt uses marijuana. Pt has had poor oral intake in the past and been admitted for similar. No chest pain. No dysuria. No focal deficits. No dyspnea. No fever. No diarrhea. No other complaints    Historical Information   Past Medical History:   Diagnosis Date    Allergic     Anemia     Anxiety     Arthritis     Cancer (HCC)     Chronic back pain     Depression     Diabetes mellitus (HCC)     Hypertension     Liver disease     Mitral valve prolapse      Peripheral neuropathy     Neuropathy    PONV (postoperative nausea and vomiting)     Thyroid disease      Past Surgical History:   Procedure Laterality Date    COLONOSCOPY      INCISION AND DRAINAGE OF WOUND Left 2022    Procedure: INCISION AND DRAINAGE (I&D) EXTREMITY;  Surgeon: Moustapha Cote DPM;  Location:  MAIN OR;  Service: Podiatry    IR BIOPSY SPINE  2024    IR BIOPSY SPINE  2024    IR PICC PLACEMENT SINGLE LUMEN  2024    JOINT REPLACEMENT Left 2022    Left TSA    NM ARTHRODESIS POSTERIOR/PSTLAT TQ 1NTRSPC LUMBAR Bilateral 2023    Procedure: L1-S1 navigated posterior decompression with instrumented fixation fusion;  Surgeon: James Moraes MD;  Location:  MAIN OR;  Service: Neurosurgery    THYROID SURGERY      remove cancer     Social History   Social History     Substance and Sexual Activity   Alcohol Use Not Currently    Alcohol/week: 4.0 - 7.0 standard drinks of alcohol    Types: 1 - 2 Glasses of wine, 2 - 3 Cans of beer, 1 - 2 Shots of liquor per week    Comment: only on special occasions     Social History     Substance and Sexual Activity   Drug Use Not Currently    Frequency: 7.0 times per week    Types: Marijuana    Comment: Medical Marijuana     Social History     Tobacco Use   Smoking Status Former    Current packs/day: 0.00    Average packs/day: 0.3 packs/day for 5.9 years (1.5 ttl pk-yrs)    Types: Cigarettes    Start date: 1975    Quit date: 1981    Years since quittin.7   Smokeless Tobacco Never   Tobacco Comments    quit ; smokes when in pain as of 26     Family History   Problem Relation Age of Onset    Hypertension Father     Dementia Mother     Diabetes Mother     Colon cancer Neg Hx        Meds/Allergies   Allergies   Allergen Reactions    Abilify [Aripiprazole] Tremor     Shaking      Cephalexin Diarrhea    Molds & Smuts Allergic Rhinitis     Other Reaction(s): Allergic Rhinitis      Red itchy eye, congestion    Pregabalin  Tremor     Lyrica - shaking feeling       Meds:    Current Facility-Administered Medications:     insulin lispro (HumALOG/ADMELOG) 100 units/mL subcutaneous injection 1-6 Units, 1-6 Units, Subcutaneous, TID AC **AND** [START ON 2/17/2025] Fingerstick Glucose (POCT), , , TID AC, Harinder Marin MD    [START ON 2/17/2025] pantoprazole (PROTONIX) injection 40 mg, 40 mg, Intravenous, Q24H CHRISTIE, Harinder Marin MD    Current Outpatient Medications:     ACCU-CHEK FABIANO PLUS test strip, 4 (four) times a day, Disp: , Rfl: 1    ACCU-CHEK FASTCLIX LANCETS MISC, 4 (four) times a day, Disp: , Rfl: 1    acetaminophen (TYLENOL) 500 mg tablet, Take 2 tablets (1,000 mg total) by mouth every 8 (eight) hours 500 mg tablet, Disp: , Rfl:     busPIRone (BUSPAR) 5 mg tablet, Take 5 mg by mouth 2 (two) times a day as needed, Disp: , Rfl:     CANNABIDIOL PO, medical marijuana as needed per latest dci  Indications: ., Disp: , Rfl:     clonazePAM (KlonoPIN) 1 mg tablet, Take 1 tablet (1 mg total) by mouth 2 (two) times a day & 3rd pill PRN for anxiety, Disp: 60 tablet, Rfl: 0    cyclobenzaprine (FLEXERIL) 5 mg tablet, Take 1 tablet (5 mg total) by mouth 3 (three) times a day as needed for muscle spasms, Disp: 60 tablet, Rfl: 0    doxycycline hyclate (VIBRAMYCIN) 100 mg capsule, Take 100 mg by mouth 2 (two) times a day Do not start before August 10, 2024., Disp: , Rfl:     escitalopram (Lexapro) 20 mg tablet, Take 20 mg by mouth daily. Indications: depression, Disp: , Rfl:     finasteride (PROSCAR) 5 mg tablet, Take 1 tablet (5 mg total) by mouth daily, Disp: 100 tablet, Rfl: 0    folic acid (FOLVITE) 1 mg tablet, Take 2,000 mcg by mouth daily, Disp: , Rfl: 6    furosemide (LASIX) 20 mg tablet, Take 20 mg by mouth. take 1 tab by mouth daily as needed for weight gain more than 3lbs overnight or more than 5lb in a week, or LE swelling., Disp: , Rfl:     gabapentin (NEURONTIN) 400 mg capsule, Take 1 capsule by mouth twice daily, Disp:  60 capsule, Rfl: 5    hydrOXYzine pamoate (VISTARIL) 50 mg capsule, Take 1 capsule (50 mg total) by mouth 2 (two) times a day, Disp: 60 capsule, Rfl: 3    Jardiance 10 MG TABS, Take 10 mg by mouth every morning, Disp: , Rfl:     ketoconazole (NIZORAL) 2 % shampoo, Apply 1 Application topically 2 (two) times a week Use as directed, Disp: 120 mL, Rfl: 1    Lactobacillus Rhamnosus, GG, (Culturelle) CAPS, Take 1 capsule by mouth daily, Disp: , Rfl:     lidocaine (LIDODERM) 5 %, Apply 1 patch topically over 12 hours daily Remove & Discard patch within 12 hours or as directed by MD, Disp: 15 patch, Rfl: 0    lidocaine (Lidoderm) 5 %, Apply 1 patch topically over 12 hours daily Remove & Discard patch within 12 hours or as directed by MD, Disp: 30 patch, Rfl: 0    lovastatin (MEVACOR) 20 mg tablet, Take 1 tablet (20 mg total) by mouth every morning, Disp: 90 tablet, Rfl: 1    metFORMIN (GLUCOPHAGE) 1000 MG tablet, Take 1 tablet (1,000 mg total) by mouth 2 (two) times a day with meals, Disp: 60 tablet, Rfl: 5    metoclopramide (REGLAN) 10 mg tablet, Take 10 mg by mouth 2 (two) times a day before meals, Disp: , Rfl:     metoprolol succinate (TOPROL-XL) 50 mg 24 hr tablet, Take 50 mg by mouth 2 (two) times a day, Disp: , Rfl:     mirtazapine (REMERON) 45 MG tablet, Take 1 tablet (45 mg total) by mouth daily at bedtime, Disp: 30 tablet, Rfl: 1    naloxone (NARCAN) 4 mg/0.1 mL nasal spray, 4 mg into each nostril, Disp: , Rfl:     NIFEdipine (ADALAT CC) 90 mg 24 hr tablet, Take 90 mg by mouth daily, Disp: , Rfl:     NIFEdipine (PROCARDIA XL) 30 mg 24 hr tablet, Take 1 tablet (30 mg total) by mouth daily With nifedipine 90 mg to total 120 mg daily, Disp: 90 tablet, Rfl: 1    ondansetron (Zofran ODT) 4 mg disintegrating tablet, Take 1 tablet (4 mg total) by mouth every 6 (six) hours as needed for nausea or vomiting, Disp: 30 tablet, Rfl: 0    patient supplied medication, medical marijuana vape as needed per latest dci  Indications:  ., Disp: , Rfl:     polyethylene glycol (MIRALAX) 17 g packet, Take 17 g by mouth, Disp: , Rfl:     predniSONE 10 mg tablet, Take 4 tabs x3 days, 3 tabs x3 days, 2 tabs x3 days, 1 tab x3 days, Disp: , Rfl:     senna (SENOKOT) 8.6 mg, Take 1 tablet (8.6 mg total) by mouth 2 (two) times a day 2 tabs at bedtime as needed for constipation per latest dci, Disp: , Rfl:     tamsulosin (FLOMAX) 0.4 mg, TAKE 1 CAPSULE BY MOUTH DAILY WITH DINNER, Disp: 90 capsule, Rfl: 1    traMADol (Ultram) 50 mg tablet, Take 1 tablet (50 mg total) by mouth every 8 (eight) hours as needed for severe pain for up to 14 days, Disp: 42 tablet, Rfl: 0    triamcinolone (KENALOG) 0.1 % ointment, Apply topically 2 (two) times a day, Disp: 30 g, Rfl: 1    Not in a hospital admission.      Review of Systems:    A complete and comprehensive 14 point organ system review was performed and all other systems are negative other than stated above in the HPI    Current Vitals:   Blood Pressure: 90/62 (02/16/25 1630)  Pulse: 67 (02/16/25 1630)  Temperature: 97.6 °F (36.4 °C) (02/16/25 1548)  Temp Source: Temporal (02/16/25 1548)  Respirations: (!) 11 (02/16/25 1630)  SpO2: 99 % (02/16/25 1630)  SPO2 RA Rest      Flowsheet Row ED to Hosp-Admission (Current) from 2/16/2025 in  [facility] Emergency Department   SpO2 99 %   SpO2 Activity At Rest   O2 Device None (Room air)   O2 Flow Rate --          No intake or output data in the 24 hours ending 02/16/25 1747  There is no height or weight on file to calculate BMI.     Physical Exam:       General: well appearing, no acute distress  HEENT: atraumatic, PERRLA, moist mucosa, normal pharynx, normal tonsils and adenoids, normal tongue, no fluid in sinuses  Neck: Trachea midline, no carotid bruit, no masses  Respiratory: normal chest wall expansion, CTA B, no r/r/w, no rubs  Cardiovascular: RRR, no m/r/g, Normal S1 and S2  Abdomen: Soft, non-tender, non-distended, normal bowel sounds in all  "quadrants, no hepatosplenomegaly, no tympany  Rectal: deferred  Musculoskeletal: normal ROM in upper and lower extremities  Integumentary: warm, dry, and pink, with no rash, purpura, or petechia  Heme/Lymph: no lymphadenopathy, no bruises  Neurological: Cranial Nerves II-XII grossly intact; no focal deficits in sensation or strength  Psychiatric: cooperative with normal mood and affect    Lab Results:   CBC:   Lab Results   Component Value Date    WBC 9.59 02/16/2025    HGB 9.8 (L) 02/16/2025    HCT 29.9 (L) 02/16/2025    MCV 79 (L) 02/16/2025     02/16/2025    RBC 3.81 (L) 02/16/2025    MCH 25.7 (L) 02/16/2025    MCHC 32.8 02/16/2025    RDW 18.9 (H) 02/16/2025    MPV 10.9 02/16/2025    NRBC 3 02/16/2025     CMP:  Lab Results   Component Value Date     02/16/2025     (L) 12/17/2024    CO2 17 (L) 02/16/2025    CO2 30 12/17/2024    BUN 72 (H) 02/16/2025    BUN 24 (H) 12/17/2024    BUN 15 08/03/2021    CREATININE 2.56 (H) 02/16/2025    CREATININE 0.89 12/17/2024    CALCIUM 8.9 02/16/2025    CALCIUM 10.2 12/17/2024    AST 32 02/16/2025    AST 18 12/17/2024    ALT 38 02/16/2025    ALT 12 (L) 12/17/2024    ALKPHOS 55 02/16/2025    ALKPHOS 65 12/17/2024    EGFR 24 02/16/2025    EGFR 94 12/17/2024     Lab Results   Component Value Date    TROPONINI <0.02 04/25/2020    CKTOTAL 54 10/06/2024    CKTOTAL 54 08/13/2024     Coagulation:   Lab Results   Component Value Date    PT 11.1 12/17/2024    INR 1.0 12/17/2024    APTT 28.2 12/17/2024    Urinalysis:  Lab Results   Component Value Date    COLORU Yellow 10/07/2024    CLARITYU Clear 10/07/2024    SPECGRAV 1.010 10/07/2024    PHUR 7.0 10/07/2024    LEUKOCYTESUR Trace (A) 10/07/2024    NITRITE Negative 10/07/2024    GLUCOSEU 1000 (1%) (A) 10/07/2024    KETONESU 10 (1+) (A) 10/07/2024    BILIRUBINUR Negative 10/07/2024    BLOODU Negative 10/07/2024      Amylase: No results found for: \"AMYLASE\"  Lipase:   Lab Results   Component Value Date    LIPASE <6 (L) " "10/06/2024        Imaging: No results found.  EKG, Pathology, and Other Studies: I have personally reviewed the results. Interp : sinus rhythm w sinus arrhythmia   VTE   Prophylaxis: In place    Code Status: Prior    Anticipated Length of Stay:  Patient will be admitted on an Inpatient basis with an anticipated length of stay of  greater 2 midnights.     D/w wife     \"This note has been constructed using a voice recognition system\"      Harinder Marin MD  2/16/2025, 5:47 PM        "

## 2025-02-16 NOTE — ED PROVIDER NOTES
Time reflects when diagnosis was documented in both MDM as applicable and the Disposition within this note       Time User Action Codes Description Comment    2/16/2025  5:01 PM Moustapha Marlow Add [N17.9] Acute kidney injury (HCC)     2/16/2025  5:01 PM Moustapha Marlow Add [E86.0] Dehydration     2/16/2025  5:46 PM Harinder Marin Add [R63.8] Decreased oral intake           ED Disposition       ED Disposition   Admit    Condition   Stable    Date/Time   Sun Feb 16, 2025  5:21 PM    Comment   Case was discussed with christ and the patient's admission status was agreed to be Admission Status: inpatient status to the service of Dr. Marin .               Assessment & Plan       Medical Decision Making  Generalized weakness differential includes dehydration infection will give IV fluids check labs    Amount and/or Complexity of Data Reviewed  Labs: ordered.    Risk  Decision regarding hospitalization.             Medications   pantoprazole (PROTONIX) injection 40 mg (has no administration in time range)   insulin lispro (HumALOG/ADMELOG) 100 units/mL subcutaneous injection 1-6 Units (has no administration in time range)   sodium chloride 0.9 % bolus 1,000 mL (1,000 mL Intravenous New Bag 2/16/25 1624)       ED Risk Strat Scores   HEART Risk Score      Flowsheet Row Most Recent Value   Heart Score Risk Calculator    History 0 Filed at: 02/16/2025 1716   ECG 1 Filed at: 02/16/2025 1716   Age 2 Filed at: 02/16/2025 1716   Risk Factors 1 Filed at: 02/16/2025 1716   Troponin 0 Filed at: 02/16/2025 1716   HEART Score 4 Filed at: 02/16/2025 1716          HEART Risk Score      Flowsheet Row Most Recent Value   Heart Score Risk Calculator    History 0 Filed at: 02/16/2025 1716   ECG 1 Filed at: 02/16/2025 1716   Age 2 Filed at: 02/16/2025 1716   Risk Factors 1 Filed at: 02/16/2025 1716   Troponin 0 Filed at: 02/16/2025 1716   HEART Score 4 Filed at: 02/16/2025 1716                                                  History of Present Illness        Chief Complaint   Patient presents with    Failure To Thrive     Pt coming from home. Pt reports daughter is concerned for lack of nutrition the last few weeks. Pt reports losing both his brothers 2 weeks ago on the same day       Past Medical History:   Diagnosis Date    Allergic     Anemia     Anxiety     Arthritis     Cancer (HCC)     Chronic back pain     Depression     Diabetes mellitus (HCC)     Hypertension     Liver disease     Mitral valve prolapse     Peripheral neuropathy     Neuropathy    PONV (postoperative nausea and vomiting)     Thyroid disease       Past Surgical History:   Procedure Laterality Date    COLONOSCOPY      INCISION AND DRAINAGE OF WOUND Left 2022    Procedure: INCISION AND DRAINAGE (I&D) EXTREMITY;  Surgeon: Moustapha Cote DPM;  Location:  MAIN OR;  Service: Podiatry    IR BIOPSY SPINE  2024    IR BIOPSY SPINE  2024    IR PICC PLACEMENT SINGLE LUMEN  2024    JOINT REPLACEMENT Left 2022    Left TSA    WA ARTHRODESIS POSTERIOR/PSTLAT TQ 1NTRSPC LUMBAR Bilateral 2023    Procedure: L1-S1 navigated posterior decompression with instrumented fixation fusion;  Surgeon: James Moraes MD;  Location:  MAIN OR;  Service: Neurosurgery    THYROID SURGERY      remove cancer      Family History   Problem Relation Age of Onset    Hypertension Father     Dementia Mother     Diabetes Mother     Colon cancer Neg Hx       Social History     Tobacco Use    Smoking status: Former     Current packs/day: 0.00     Average packs/day: 0.3 packs/day for 5.9 years (1.5 ttl pk-yrs)     Types: Cigarettes     Start date: 1975     Quit date: 1981     Years since quittin.7    Smokeless tobacco: Never    Tobacco comments:     quit ; smokes when in pain as of 26   Vaping Use    Vaping status: Former    Substances: THC, CBD   Substance Use Topics    Alcohol use: Not Currently     Alcohol/week: 4.0 - 7.0 standard drinks of alcohol     Types: 1 - 2 Glasses  of wine, 2 - 3 Cans of beer, 1 - 2 Shots of liquor per week     Comment: only on special occasions    Drug use: Not Currently     Frequency: 7.0 times per week     Types: Marijuana     Comment: Medical Marijuana      E-Cigarette/Vaping    E-Cigarette Use Former User     Comments medical marijuana - occ       E-Cigarette/Vaping Substances    Nicotine No     THC Yes     CBD Yes     Flavoring No     Other No     Unknown No       I have reviewed and agree with the history as documented.     .  .this is a 69-year-old male who presents from home via ambulance with generalized weakness decreased p.o. intake.  Had 2 brothers passed away in August family was concerned with dehydration and malnutrition he does have diarrhea and is currently on doxycycline for spinal infection and plan to have revision at a later date.  Denies any suicidal plans generalized.  Initial blood pressure in the emergency room was 90/54      History provided by:  Patient  Medical Problem  Location:  Generalized  Quality:  Weakness and depression  Severity:  Severe  Onset quality:  Gradual  Duration:  1 week  Timing:  Constant  Progression:  Worsening  Chronicity:  New  Context:  Generalized weakness decreased p.o. intake  Associated symptoms: diarrhea    Associated symptoms: no abdominal pain, no chest pain and no shortness of breath        Review of Systems   Constitutional:  Positive for appetite change.   Respiratory:  Negative for shortness of breath.    Cardiovascular:  Negative for chest pain.   Gastrointestinal:  Positive for diarrhea. Negative for abdominal pain.   Neurological:  Positive for weakness.   Psychiatric/Behavioral:  Positive for dysphoric mood. Negative for suicidal ideas.    All other systems reviewed and are negative.          Objective       ED Triage Vitals [02/16/25 1548]   Temperature Pulse Blood Pressure Respirations SpO2 Patient Position - Orthostatic VS   97.6 °F (36.4 °C) 78 90/54 18 99 % Sitting      Temp Source Heart  Rate Source BP Location FiO2 (%) Pain Score    Temporal Monitor Left arm -- --      Vitals      Date and Time Temp Pulse SpO2 Resp BP Pain Score FACES Pain Rating User   02/16/25 1821 96 °F (35.6 °C) 66 99 % -- 98/49 -- -- DII   02/16/25 1820 96 °F (35.6 °C) 68 99 % -- 98/49 -- -- DII   02/16/25 1700 -- 64 100 % 16 105/55 -- -- LK   02/16/25 1630 -- 67 99 % 11 90/62 -- -- LK   02/16/25 1600 -- 73 100 % 14 107/57 -- -- LK   02/16/25 1548 97.6 °F (36.4 °C) 78 99 % 18 90/54 -- -- CM            Physical Exam  Vitals and nursing note reviewed.   Constitutional:       Appearance: He is ill-appearing. He is not toxic-appearing.   HENT:      Head: Normocephalic and atraumatic.      Right Ear: Tympanic membrane, ear canal and external ear normal.      Left Ear: Tympanic membrane, ear canal and external ear normal.      Mouth/Throat:      Mouth: Mucous membranes are dry.   Eyes:      General:         Right eye: No discharge.         Left eye: No discharge.      Extraocular Movements: Extraocular movements intact.      Pupils: Pupils are equal, round, and reactive to light.   Cardiovascular:      Rate and Rhythm: Normal rate and regular rhythm.      Pulses: Normal pulses.      Heart sounds: No murmur heard.     No friction rub. No gallop.   Pulmonary:      Effort: No respiratory distress.      Breath sounds: No stridor. No wheezing, rhonchi or rales.   Abdominal:      General: There is no distension.      Palpations: Abdomen is soft.      Tenderness: There is no abdominal tenderness. There is no guarding.   Musculoskeletal:         General: No swelling, tenderness, deformity or signs of injury.      Cervical back: Neck supple. No rigidity.      Right lower leg: No edema.      Left lower leg: No edema.   Skin:     General: Skin is warm and dry.      Findings: No erythema or rash.   Neurological:      General: No focal deficit present.      Mental Status: He is alert and oriented to person, place, and time.      Cranial Nerves:  No cranial nerve deficit.      Sensory: No sensory deficit.      Motor: Weakness present.   Psychiatric:         Thought Content: Thought content normal.      Comments: Flat affect denying any suicidal ideation         Results Reviewed       Procedure Component Value Units Date/Time    HS Troponin I 4hr [[id number]]     Lab Status: No result Specimen: Blood     Protime-INR [[id number]]  (Normal) Collected: 02/16/25 1737    Lab Status: Final result Specimen: Blood from Arm, Left Updated: 02/16/25 1756     Protime 14.6 seconds      INR 1.09    Narrative:      INR Therapeutic Range    Indication                                             INR Range      Atrial Fibrillation                                               2.0-3.0  Hypercoagulable State                                    2.0.2.3  Left Ventricular Asist Device                            2.0-3.0  Mechanical Heart Valve                                  -    Aortic(with afib, MI, embolism, HF, LA enlargement,    and/or coagulopathy)                                     2.0-3.0 (2.5-3.5)     Mitral                                                             2.5-3.5  Prosthetic/Bioprosthetic Heart Valve               2.0-3.0  Venous thromboembolism (VTE: VT, PE        2.0-3.0    APTT [[id number]]  (Normal) Collected: 02/16/25 1737    Lab Status: Final result Specimen: Blood from Arm, Left Updated: 02/16/25 1756     PTT 23 seconds     TSH, 3rd generation with Free T4 reflex [[id number]]  (Abnormal) Collected: 02/16/25 1612    Lab Status: Final result Specimen: Blood from Arm, Left Updated: 02/16/25 1728     TSH 3RD GENERATON 6.241 uIU/mL     Procalcitonin [[id number]]  (Abnormal) Collected: 02/16/25 1612    Lab Status: Final result Specimen: Blood from Arm, Left Updated: 02/16/25 1728     Procalcitonin 0.31 ng/ml     T4, free [[id number]] Collected: 02/16/25 1612    Lab Status: In process Specimen: Blood from Arm, Left Updated: 02/16/25 1728    HS Troponin I 2hr  [[id number]]     Lab Status: No result Specimen: Blood     HS Troponin 0hr (reflex protocol) [[id number]]  (Normal) Collected: 02/16/25 1612    Lab Status: Final result Specimen: Blood from Arm, Left Updated: 02/16/25 1703     hs TnI 0hr 6 ng/L     Lactic acid, plasma (w/reflex if result > 2.0) [[id number]]  (Abnormal) Collected: 02/16/25 1620    Lab Status: Final result Specimen: Blood from Arm, Left Updated: 02/16/25 1701     LACTIC ACID 3.2 mmol/L     Narrative:      Result may be elevated if tourniquet was used during collection.    Lactic acid 2 Hours [[id number]]     Lab Status: No result Specimen: Blood     Comprehensive metabolic panel [[id number]]  (Abnormal) Collected: 02/16/25 1612    Lab Status: Final result Specimen: Blood from Arm, Left Updated: 02/16/25 1657     Sodium 138 mmol/L      Potassium 3.8 mmol/L      Chloride 107 mmol/L      CO2 17 mmol/L      ANION GAP 14 mmol/L      BUN 72 mg/dL      Creatinine 2.56 mg/dL      Glucose 170 mg/dL      Calcium 8.9 mg/dL      AST 32 U/L      ALT 38 U/L      Alkaline Phosphatase 55 U/L      Total Protein 5.7 g/dL      Albumin 3.6 g/dL      Total Bilirubin 0.42 mg/dL      eGFR 24 ml/min/1.73sq m     Narrative:      National Kid[employer] guidelines for Chronic Kidney Disease (CKD):     Stage 1 with normal or high GFR (GFR > 90 mL/min/1.73 square meters)    Stage 2 Mild CKD (GFR = 60-89 mL/min/1.73 square meters)    Stage 3A Moderate CKD (GFR = 45-59 mL/min/1.73 square meters)    Stage 3B Moderate CKD (GFR = 30-44 mL/min/1.73 square meters)    Stage 4 Severe CKD (GFR = 15-29 mL/min/1.73 square meters)    Stage 5 End Stage CKD (GFR <15 mL/min/1.73 square meters)  Note: GFR calculation is accurate only with a steady state creatinine    Salicylate level [[id number]]  (Normal) Collected: 02/16/25 1612    Lab Status: Final result Specimen: Blood from Arm, Left Updated: 02/16/25 1657     Salicylate Lvl <5 mg/dL     Acetaminophen level-If concentration is  detectable, please discuss with medical  on call. [[id number]]  (Abnormal) Collected: 02/16/25 1612    Lab Status: Final result Specimen: Blood from Arm, Left Updated: 02/16/25 1657     Acetaminophen Level 2 ug/mL     FLU/COVID Rapid Antigen (30 min. TAT) - Preferred screening test in ED [[id number]]  (Normal) Collected: 02/16/25 1612    Lab Status: Final result Specimen: Nares from Nose Updated: 02/16/25 1656     SARS COV Rapid Antigen Negative     Influenza A Rapid Antigen Negative     Influenza B Rapid Antigen Negative    Narrative:      This test has been performed using the [employer] Dragon Nel 2 FLU+SARS Antigen test under the Emergency Use Authorization (EUA). This test has been validated by the  and verified by the performing laboratory. The Nel uses lateral flow immunofluorescent sandwich assay to detect SARS-COV, Influenza A and Influenza B Antigen.     The Quidel Nel 2 SARS Antigen test does not differentiate between SARS-CoV and SARS-CoV-2.     Negative results are presumptive and may be confirmed with a molecular assay, if necessary, for patient management. Negative results do not rule out SARS-CoV-2 or influenza infection and should not be used as the sole basis for treatment or patient management decisions. A negative test result may occur if the level of antigen in a sample is below the limit of detection of this test.     Positive results are indicative of the presence of viral antigens, but do not rule out bacterial infection or co-infection with other viruses.     All test results should be used as an adjunct to clinical observations and other information available to the provider.    FOR PEDIATRIC PATIENTS - copy/paste COVID Guidelines URL to browser: [email or web address]    Ethanol [[id number]]  (Normal) Collected: 02/16/25 1612    Lab Status: Final result Specimen: Blood from Arm, Left Updated: 02/16/25 1656     Ethanol Lvl  <10 mg/dL     Blood culture #1 [[id number]] Collected: 25    Lab Status: In process Specimen: Blood from Arm, Left Updated: 25 1645    Blood culture #2 [[id number]] Collected: 25    Lab Status: In process Specimen: Blood from Arm, Left Updated: 25 1645    CBC and differential [[id number]]  (Abnormal) Collected: 25    Lab Status: Final result Specimen: Blood from Arm, Left Updated: 25 1640     WBC 9.59 Thousand/uL      RBC 3.81 Million/uL      Hemoglobin 9.8 g/dL      Hematocrit 29.9 %      MCV 79 fL      MCH 25.7 pg      MCHC 32.8 g/dL      RDW 18.9 %      MPV 10.9 fL      Platelets 325 Thousands/uL      nRBC 3 /100 WBCs      Segmented % 57 %      Immature Grans % 1 %      Lymphocytes % 36 %      Monocytes % 5 %      Eosinophils Relative 0 %      Basophils Relative 1 %      Absolute Neutrophils 5.51 Thousands/µL      Absolute Immature Grans 0.08 Thousand/uL      Absolute Lymphocytes 3.47 Thousands/µL      Absolute Monocytes 0.46 Thousand/µL      Eosinophils Absolute 0.02 Thousand/µL      Basophils Absolute 0.05 Thousands/µL     UA w Reflex to Microscopic w Reflex to Culture [[id number]]     Lab Status: No result Specimen: Urine     Rapid drug screen, urine [[id number]]     Lab Status: No result Specimen: Urine             No orders to display       ECG 12 Lead Documentation Only    Date/Time: 2025 4:35 PM    Performed by: Moustapha Marlow DO  Authorized by: Moustapha Marlow DO    ECG reviewed by me, the ED Provider: yes    Patient location:  ED  Rate:     ECG rate:  82  Rhythm:     Rhythm: sinus rhythm    Conduction:     Conduction: normal    ST segments:     ST segments:  Non-specific      ED Medication and Procedure Management   Prior to Admission Medications   Prescriptions Last Dose Informant Patient Reported? Taking?   ACCU-CHEK FABIANO PLUS test strip  Spouse/Significant Other Yes No   Si (four) times a day   ACCU-CHEK FASTCLIX LANCETS MIS   Spouse/Significant Other Yes No   Si (four) times a day   CANNABIDIOL PO  Spouse/Significant Other Yes No   Sig: medical marijuana as needed per latest dci  Indications: .   Jardiance 10 MG TABS  Spouse/Significant Other Yes No   Sig: Take 10 mg by mouth every morning   Lactobacillus Rhamnosus, GG, (Culturelle) CAPS  Spouse/Significant Other Yes No   Sig: Take 1 capsule by mouth daily   NIFEdipine (ADALAT CC) 90 mg 24 hr tablet  Spouse/Significant Other Yes No   Sig: Take 90 mg by mouth daily   NIFEdipine (PROCARDIA XL) 30 mg 24 hr tablet   No No   Sig: Take 1 tablet (30 mg total) by mouth daily With nifedipine 90 mg to total 120 mg daily   acetaminophen (TYLENOL) 500 mg tablet  Spouse/Significant Other No No   Sig: Take 2 tablets (1,000 mg total) by mouth every 8 (eight) hours 500 mg tablet   busPIRone (BUSPAR) 5 mg tablet  Spouse/Significant Other Yes No   Sig: Take 5 mg by mouth 2 (two) times a day as needed   clonazePAM (KlonoPIN) 1 mg tablet   No No   Sig: Take 1 tablet (1 mg total) by mouth 2 (two) times a day & 3rd pill PRN for anxiety   cyclobenzaprine (FLEXERIL) 5 mg tablet   No No   Sig: Take 1 tablet (5 mg total) by mouth 3 (three) times a day as needed for muscle spasms   doxycycline hyclate (VIBRAMYCIN) 100 mg capsule  Spouse/Significant Other Yes No   Sig: Take 100 mg by mouth 2 (two) times a day Do not start before August 10, 2024.   escitalopram (Lexapro) 20 mg tablet  Spouse/Significant Other Yes No   Sig: Take 20 mg by mouth daily. Indications: depression   finasteride (PROSCAR) 5 mg tablet   No No   Sig: Take 1 tablet (5 mg total) by mouth daily   folic acid (FOLVITE) 1 mg tablet  Spouse/Significant Other Yes No   Sig: Take 2,000 mcg by mouth daily   furosemide (LASIX) 20 mg tablet  Spouse/Significant Other Yes No   Sig: Take 20 mg by mouth. take 1 tab by mouth daily as needed for weight gain more than 3lbs overnight or more than 5lb in a week, or LE swelling.   gabapentin (NEURONTIN) 400 mg  capsule   No No   Sig: Take 1 capsule by mouth twice daily   hydrOXYzine pamoate (VISTARIL) 50 mg capsule   No No   Sig: Take 1 capsule (50 mg total) by mouth 2 (two) times a day   ketoconazole (NIZORAL) 2 % shampoo   No No   Sig: Apply 1 Application topically 2 (two) times a week Use as directed   lidocaine (LIDODERM) 5 %  Spouse/Significant Other No No   Sig: Apply 1 patch topically over 12 hours daily Remove & Discard patch within 12 hours or as directed by MD   lidocaine (Lidoderm) 5 %   No No   Sig: Apply 1 patch topically over 12 hours daily Remove & Discard patch within 12 hours or as directed by MD   lovastatin (MEVACOR) 20 mg tablet   No No   Sig: Take 1 tablet (20 mg total) by mouth every morning   metFORMIN (GLUCOPHAGE) 1000 MG tablet   No No   Sig: Take 1 tablet (1,000 mg total) by mouth 2 (two) times a day with meals   metoclopramide (REGLAN) 10 mg tablet  Spouse/Significant Other Yes No   Sig: Take 10 mg by mouth 2 (two) times a day before meals   metoprolol succinate (TOPROL-XL) 50 mg 24 hr tablet  Spouse/Significant Other Yes No   Sig: Take 50 mg by mouth 2 (two) times a day   mirtazapine (REMERON) 45 MG tablet   No No   Sig: Take 1 tablet (45 mg total) by mouth daily at bedtime   naloxone (NARCAN) 4 mg/0.1 mL nasal spray  Spouse/Significant Other Yes No   Si mg into each nostril   ondansetron (Zofran ODT) 4 mg disintegrating tablet  Spouse/Significant Other No No   Sig: Take 1 tablet (4 mg total) by mouth every 6 (six) hours as needed for nausea or vomiting   patient supplied medication  Spouse/Significant Other Yes No   Sig: medical marijuana vape as needed per latest dci  Indications: .   polyethylene glycol (MIRALAX) 17 g packet  Spouse/Significant Other Yes No   Sig: Take 17 g by mouth   predniSONE 10 mg tablet  Spouse/Significant Other Yes No   Sig: Take 4 tabs x3 days, 3 tabs x3 days, 2 tabs x3 days, 1 tab x3 days   senna (SENOKOT) 8.6 mg  Spouse/Significant Other No No   Sig: Take 1  tablet (8.6 mg total) by mouth 2 (two) times a day 2 tabs at bedtime as needed for constipation per latest dci   tamsulosin (FLOMAX) 0.4 mg  Spouse/Significant Other No No   Sig: TAKE 1 CAPSULE BY MOUTH DAILY WITH DINNER   traMADol (Ultram) 50 mg tablet   No No   Sig: Take 1 tablet (50 mg total) by mouth every 8 (eight) hours as needed for severe pain for up to 14 days   triamcinolone (KENALOG) 0.1 % ointment   No No   Sig: Apply topically 2 (two) times a day      Facility-Administered Medications: None     Current Discharge Medication List        CONTINUE these medications which have NOT CHANGED    Details   ACCU-CHEK FABIANO PLUS test strip 4 (four) times a day  Refills: 1      ACCU-CHEK FASTCLIX LANCETS MISC 4 (four) times a day  Refills: 1      acetaminophen (TYLENOL) 500 mg tablet Take 2 tablets (1,000 mg total) by mouth every 8 (eight) hours 500 mg tablet    Associated Diagnoses: S/P lumbar fusion      busPIRone (BUSPAR) 5 mg tablet Take 5 mg by mouth 2 (two) times a day as needed      CANNABIDIOL PO medical marijuana as needed per latest dci  Indications: .      clonazePAM (KlonoPIN) 1 mg tablet Take 1 tablet (1 mg total) by mouth 2 (two) times a day & 3rd pill PRN for anxiety  Qty: 60 tablet, Refills: 0    Associated Diagnoses: Anxiety and depression      cyclobenzaprine (FLEXERIL) 5 mg tablet Take 1 tablet (5 mg total) by mouth 3 (three) times a day as needed for muscle spasms  Qty: 60 tablet, Refills: 0    Associated Diagnoses: Lumbar radiculopathy; Muscle spasm      doxycycline hyclate (VIBRAMYCIN) 100 mg capsule Take 100 mg by mouth 2 (two) times a day Do not start before August 10, 2024.      escitalopram (Lexapro) 20 mg tablet Take 20 mg by mouth daily. Indications: depression      finasteride (PROSCAR) 5 mg tablet Take 1 tablet (5 mg total) by mouth daily  Qty: 100 tablet, Refills: 0    Comments: This prescription was filled on 5/7/2024. Any refills authorized will be placed on file.  Associated  Diagnoses: Urinary retention      folic acid (FOLVITE) 1 mg tablet Take 2,000 mcg by mouth daily  Refills: 6      furosemide (LASIX) 20 mg tablet Take 20 mg by mouth. take 1 tab by mouth daily as needed for weight gain more than 3lbs overnight or more than 5lb in a week, or LE swelling.      gabapentin (NEURONTIN) 400 mg capsule Take 1 capsule by mouth twice daily  Qty: 60 capsule, Refills: 5    Associated Diagnoses: Diabetic peripheral neuropathy (HCC); Lumbar radiculopathy      hydrOXYzine pamoate (VISTARIL) 50 mg capsule Take 1 capsule (50 mg total) by mouth 2 (two) times a day  Qty: 60 capsule, Refills: 3    Associated Diagnoses: Anxiety and depression      Jardiance 10 MG TABS Take 10 mg by mouth every morning      ketoconazole (NIZORAL) 2 % shampoo Apply 1 Application topically 2 (two) times a week Use as directed  Qty: 120 mL, Refills: 1    Associated Diagnoses: Seborrheic dermatitis      Lactobacillus Rhamnosus, GG, (Culturelle) CAPS Take 1 capsule by mouth daily      !! lidocaine (LIDODERM) 5 % Apply 1 patch topically over 12 hours daily Remove & Discard patch within 12 hours or as directed by MD  Qty: 15 patch, Refills: 0    Associated Diagnoses: S/P lumbar fusion      !! lidocaine (Lidoderm) 5 % Apply 1 patch topically over 12 hours daily Remove & Discard patch within 12 hours or as directed by MD  Qty: 30 patch, Refills: 0    Associated Diagnoses: Acute exacerbation of chronic low back pain      lovastatin (MEVACOR) 20 mg tablet Take 1 tablet (20 mg total) by mouth every morning  Qty: 90 tablet, Refills: 1    Associated Diagnoses: Hypercholesterolemia      metFORMIN (GLUCOPHAGE) 1000 MG tablet Take 1 tablet (1,000 mg total) by mouth 2 (two) times a day with meals  Qty: 60 tablet, Refills: 5    Associated Diagnoses: Type 2 diabetes mellitus with hyperglycemia, without long-term current use of insulin (HCC)      metoclopramide (REGLAN) 10 mg tablet Take 10 mg by mouth 2 (two) times a day before meals       metoprolol succinate (TOPROL-XL) 50 mg 24 hr tablet Take 50 mg by mouth 2 (two) times a day      mirtazapine (REMERON) 45 MG tablet Take 1 tablet (45 mg total) by mouth daily at bedtime  Qty: 30 tablet, Refills: 1    Associated Diagnoses: Bipolar II disorder (HCC)      naloxone (NARCAN) 4 mg/0.1 mL nasal spray 4 mg into each nostril      !! NIFEdipine (ADALAT CC) 90 mg 24 hr tablet Take 90 mg by mouth daily      !! NIFEdipine (PROCARDIA XL) 30 mg 24 hr tablet Take 1 tablet (30 mg total) by mouth daily With nifedipine 90 mg to total 120 mg daily  Qty: 90 tablet, Refills: 1    Associated Diagnoses: Primary hypertension      ondansetron (Zofran ODT) 4 mg disintegrating tablet Take 1 tablet (4 mg total) by mouth every 6 (six) hours as needed for nausea or vomiting  Qty: 30 tablet, Refills: 0    Associated Diagnoses: Viral gastroenteritis      patient supplied medication medical marijuana vape as needed per latest dci  Indications: .      polyethylene glycol (MIRALAX) 17 g packet Take 17 g by mouth      predniSONE 10 mg tablet Take 4 tabs x3 days, 3 tabs x3 days, 2 tabs x3 days, 1 tab x3 days      senna (SENOKOT) 8.6 mg Take 1 tablet (8.6 mg total) by mouth 2 (two) times a day 2 tabs at bedtime as needed for constipation per latest dci    Associated Diagnoses: Chronic bilateral low back pain with bilateral sciatica      tamsulosin (FLOMAX) 0.4 mg TAKE 1 CAPSULE BY MOUTH DAILY WITH DINNER  Qty: 90 capsule, Refills: 1    Associated Diagnoses: S/P lumbar fusion      traMADol (Ultram) 50 mg tablet Take 1 tablet (50 mg total) by mouth every 8 (eight) hours as needed for severe pain for up to 14 days  Qty: 42 tablet, Refills: 0    Associated Diagnoses: Chronic bilateral low back pain without sciatica      triamcinolone (KENALOG) 0.1 % ointment Apply topically 2 (two) times a day  Qty: 30 g, Refills: 1    Associated Diagnoses: Eczema, unspecified type       !! - Potential duplicate medications found. Please discuss with  provider.        No discharge procedures on file.  ED SEPSIS DOCUMENTATION   Time reflects when diagnosis was documented in both MDM as applicable and the Disposition within this note       Time User Action Codes Description Comment    2/16/2025  5:01 PM Moustapha Marlow Add [N17.9] Acute kidney injury (HCC)     2/16/2025  5:01 PM Moustapha Marlow Add [E86.0] Dehydration     2/16/2025  5:46 PM Harinder Marin Add [R63.8] Decreased oral intake            Initial Sepsis Screening       Row Name 02/16/25 6466                Is the patient's history suggestive of a new or worsening infection? No  SIRS criteria is negative patient appears dehydrated on examination no sign of infection  -KAYLEE                  User Key  (r) = Recorded By, (t) = Taken By, (c) = Cosigned By      Initials Name Provider Type    KAYLEE Marlow DO Physician                       Moustapha Marlow DO  02/16/25 5840

## 2025-02-16 NOTE — ASSESSMENT & PLAN NOTE
Pt has non tender abdomen   Pt is using medical marijuana which in past was thought to be contributing to GI sx  Will give IV PPI  Consult GI  Diet as tolerated   Pt has NSAID usage in setting of his back issues which I have avoid discontinuing and describes epigastric pain/burning

## 2025-02-16 NOTE — ASSESSMENT & PLAN NOTE
Avoid hypotension, nephrotoxins, and NSAIDS if possible    IV fluids  Baseline Cr <1. Cr on presentation 2.56, markedly elevated from baseline  Bolused 1 L in the ER  Recheck Cr in the AM  If persists consult nephrology, check renal US

## 2025-02-17 ENCOUNTER — TELEPHONE (OUTPATIENT)
Dept: FAMILY MEDICINE CLINIC | Facility: CLINIC | Age: 70
End: 2025-02-17

## 2025-02-17 PROBLEM — K52.9 CHRONIC DIARRHEA: Status: ACTIVE | Noted: 2025-02-17

## 2025-02-17 LAB
ALBUMIN SERPL BCG-MCNC: 2.9 G/DL (ref 3.5–5)
ALP SERPL-CCNC: 45 U/L (ref 34–104)
ALT SERPL W P-5'-P-CCNC: 28 U/L (ref 7–52)
ANION GAP SERPL CALCULATED.3IONS-SCNC: 9 MMOL/L (ref 4–13)
AST SERPL W P-5'-P-CCNC: 22 U/L (ref 13–39)
BASOPHILS # BLD AUTO: 0.06 THOUSANDS/ΜL (ref 0–0.1)
BASOPHILS NFR BLD AUTO: 1 % (ref 0–1)
BILIRUB SERPL-MCNC: 0.4 MG/DL (ref 0.2–1)
BUN SERPL-MCNC: 62 MG/DL (ref 5–25)
CALCIUM ALBUM COR SERPL-MCNC: 8.9 MG/DL (ref 8.3–10.1)
CALCIUM SERPL-MCNC: 8 MG/DL (ref 8.4–10.2)
CHLORIDE SERPL-SCNC: 112 MMOL/L (ref 96–108)
CO2 SERPL-SCNC: 19 MMOL/L (ref 21–32)
CREAT SERPL-MCNC: 2.04 MG/DL (ref 0.6–1.3)
EOSINOPHIL # BLD AUTO: 0.07 THOUSAND/ΜL (ref 0–0.61)
EOSINOPHIL NFR BLD AUTO: 1 % (ref 0–6)
ERYTHROCYTE [DISTWIDTH] IN BLOOD BY AUTOMATED COUNT: 18.8 % (ref 11.6–15.1)
FERRITIN SERPL-MCNC: 137 NG/ML (ref 24–336)
GFR SERPL CREATININE-BSD FRML MDRD: 32 ML/MIN/1.73SQ M
GLUCOSE SERPL-MCNC: 109 MG/DL (ref 65–140)
GLUCOSE SERPL-MCNC: 62 MG/DL (ref 65–140)
GLUCOSE SERPL-MCNC: 70 MG/DL (ref 65–140)
GLUCOSE SERPL-MCNC: 80 MG/DL (ref 65–140)
HCT VFR BLD AUTO: 25.2 % (ref 36.5–49.3)
HGB BLD-MCNC: 8.3 G/DL (ref 12–17)
IMM GRANULOCYTES # BLD AUTO: 0.04 THOUSAND/UL (ref 0–0.2)
IMM GRANULOCYTES NFR BLD AUTO: 1 % (ref 0–2)
IRON SATN MFR SERPL: 86 % (ref 15–50)
IRON SERPL-MCNC: 104 UG/DL (ref 50–212)
LYMPHOCYTES # BLD AUTO: 3.11 THOUSANDS/ΜL (ref 0.6–4.47)
LYMPHOCYTES NFR BLD AUTO: 42 % (ref 14–44)
MCH RBC QN AUTO: 25.9 PG (ref 26.8–34.3)
MCHC RBC AUTO-ENTMCNC: 32.9 G/DL (ref 31.4–37.4)
MCV RBC AUTO: 79 FL (ref 82–98)
MONOCYTES # BLD AUTO: 0.44 THOUSAND/ΜL (ref 0.17–1.22)
MONOCYTES NFR BLD AUTO: 6 % (ref 4–12)
NEUTROPHILS # BLD AUTO: 3.72 THOUSANDS/ΜL (ref 1.85–7.62)
NEUTS SEG NFR BLD AUTO: 49 % (ref 43–75)
NRBC BLD AUTO-RTO: 2 /100 WBCS
PLATELET # BLD AUTO: 254 THOUSANDS/UL (ref 149–390)
PMV BLD AUTO: 10.7 FL (ref 8.9–12.7)
POTASSIUM SERPL-SCNC: 3.1 MMOL/L (ref 3.5–5.3)
PROT SERPL-MCNC: 4.7 G/DL (ref 6.4–8.4)
RBC # BLD AUTO: 3.2 MILLION/UL (ref 3.88–5.62)
SODIUM SERPL-SCNC: 140 MMOL/L (ref 135–147)
TIBC SERPL-MCNC: 120.4 UG/DL (ref 250–450)
TRANSFERRIN SERPL-MCNC: 86 MG/DL (ref 203–362)
UIBC SERPL-MCNC: <55 UG/DL (ref 155–355)
WBC # BLD AUTO: 7.44 THOUSAND/UL (ref 4.31–10.16)

## 2025-02-17 PROCEDURE — 99233 SBSQ HOSP IP/OBS HIGH 50: CPT | Performed by: STUDENT IN AN ORGANIZED HEALTH CARE EDUCATION/TRAINING PROGRAM

## 2025-02-17 PROCEDURE — 99223 1ST HOSP IP/OBS HIGH 75: CPT | Performed by: INTERNAL MEDICINE

## 2025-02-17 PROCEDURE — 82948 REAGENT STRIP/BLOOD GLUCOSE: CPT

## 2025-02-17 PROCEDURE — 85025 COMPLETE CBC W/AUTO DIFF WBC: CPT | Performed by: HOSPITALIST

## 2025-02-17 PROCEDURE — 83540 ASSAY OF IRON: CPT | Performed by: PHYSICIAN ASSISTANT

## 2025-02-17 PROCEDURE — 80053 COMPREHEN METABOLIC PANEL: CPT | Performed by: HOSPITALIST

## 2025-02-17 PROCEDURE — 83550 IRON BINDING TEST: CPT | Performed by: PHYSICIAN ASSISTANT

## 2025-02-17 PROCEDURE — 82728 ASSAY OF FERRITIN: CPT | Performed by: PHYSICIAN ASSISTANT

## 2025-02-17 RX ORDER — NIFEDIPINE 30 MG/1
90 TABLET, EXTENDED RELEASE ORAL DAILY
Status: DISCONTINUED | OUTPATIENT
Start: 2025-02-18 | End: 2025-02-21 | Stop reason: HOSPADM

## 2025-02-17 RX ORDER — OXYCODONE HYDROCHLORIDE 5 MG/1
5 TABLET ORAL EVERY 6 HOURS PRN
Refills: 0 | Status: DISCONTINUED | OUTPATIENT
Start: 2025-02-17 | End: 2025-02-21 | Stop reason: HOSPADM

## 2025-02-17 RX ORDER — GABAPENTIN 300 MG/1
300 CAPSULE ORAL 3 TIMES DAILY
Status: DISCONTINUED | OUTPATIENT
Start: 2025-02-17 | End: 2025-02-21 | Stop reason: HOSPADM

## 2025-02-17 RX ORDER — NIFEDIPINE 30 MG/1
30 TABLET, EXTENDED RELEASE ORAL DAILY
Status: DISCONTINUED | OUTPATIENT
Start: 2025-02-18 | End: 2025-02-21 | Stop reason: HOSPADM

## 2025-02-17 RX ADMIN — DOXYCYCLINE 100 MG: 100 CAPSULE ORAL at 17:53

## 2025-02-17 RX ADMIN — FINASTERIDE 5 MG: 5 TABLET, FILM COATED ORAL at 08:39

## 2025-02-17 RX ADMIN — MIRTAZAPINE 45 MG: 15 TABLET, FILM COATED ORAL at 21:11

## 2025-02-17 RX ADMIN — HYDROXYZINE HYDROCHLORIDE 50 MG: 25 TABLET, FILM COATED ORAL at 08:38

## 2025-02-17 RX ADMIN — SODIUM CHLORIDE, SODIUM LACTATE, POTASSIUM CHLORIDE, AND CALCIUM CHLORIDE 100 ML/HR: .6; .31; .03; .02 INJECTION, SOLUTION INTRAVENOUS at 04:33

## 2025-02-17 RX ADMIN — FOLIC ACID 2000 MCG: 1 TABLET ORAL at 08:38

## 2025-02-17 RX ADMIN — NIFEDIPINE 90 MG: 30 TABLET, FILM COATED, EXTENDED RELEASE ORAL at 09:03

## 2025-02-17 RX ADMIN — ENOXAPARIN SODIUM 30 MG: 100 INJECTION SUBCUTANEOUS at 08:38

## 2025-02-17 RX ADMIN — METOPROLOL SUCCINATE 50 MG: 50 TABLET, EXTENDED RELEASE ORAL at 08:38

## 2025-02-17 RX ADMIN — SODIUM CHLORIDE, SODIUM LACTATE, POTASSIUM CHLORIDE, AND CALCIUM CHLORIDE 50 ML/HR: .6; .31; .03; .02 INJECTION, SOLUTION INTRAVENOUS at 19:46

## 2025-02-17 RX ADMIN — CYCLOBENZAPRINE HYDROCHLORIDE 5 MG: 5 TABLET, FILM COATED ORAL at 17:58

## 2025-02-17 RX ADMIN — ESCITALOPRAM OXALATE 20 MG: 20 TABLET ORAL at 08:38

## 2025-02-17 RX ADMIN — OXYCODONE HYDROCHLORIDE 5 MG: 5 TABLET ORAL at 12:11

## 2025-02-17 RX ADMIN — ACETAMINOPHEN 975 MG: 325 TABLET, FILM COATED ORAL at 06:17

## 2025-02-17 RX ADMIN — STANDARDIZED SENNA CONCENTRATE 8.6 MG: 8.6 TABLET ORAL at 08:38

## 2025-02-17 RX ADMIN — GABAPENTIN 300 MG: 300 CAPSULE ORAL at 17:53

## 2025-02-17 RX ADMIN — ACETAMINOPHEN 975 MG: 325 TABLET, FILM COATED ORAL at 13:30

## 2025-02-17 RX ADMIN — STANDARDIZED SENNA CONCENTRATE 8.6 MG: 8.6 TABLET ORAL at 17:53

## 2025-02-17 RX ADMIN — LIDOCAINE 5% 1 PATCH: 700 PATCH TOPICAL at 08:38

## 2025-02-17 RX ADMIN — NIFEDIPINE 30 MG: 30 TABLET, FILM COATED, EXTENDED RELEASE ORAL at 09:03

## 2025-02-17 RX ADMIN — CLONAZEPAM 1 MG: 1 TABLET ORAL at 08:39

## 2025-02-17 RX ADMIN — DOXYCYCLINE 100 MG: 100 CAPSULE ORAL at 08:39

## 2025-02-17 RX ADMIN — GABAPENTIN 300 MG: 300 CAPSULE ORAL at 21:11

## 2025-02-17 RX ADMIN — CLONAZEPAM 1 MG: 1 TABLET ORAL at 17:53

## 2025-02-17 RX ADMIN — PANTOPRAZOLE SODIUM 40 MG: 40 INJECTION, POWDER, FOR SOLUTION INTRAVENOUS at 08:38

## 2025-02-17 RX ADMIN — PRAVASTATIN SODIUM 20 MG: 20 TABLET ORAL at 17:53

## 2025-02-17 RX ADMIN — GABAPENTIN 200 MG: 100 CAPSULE ORAL at 08:38

## 2025-02-17 RX ADMIN — TRAMADOL HYDROCHLORIDE 50 MG: 50 TABLET, COATED ORAL at 10:38

## 2025-02-17 RX ADMIN — ACETAMINOPHEN 975 MG: 325 TABLET, FILM COATED ORAL at 21:11

## 2025-02-17 RX ADMIN — HYDROXYZINE HYDROCHLORIDE 50 MG: 25 TABLET, FILM COATED ORAL at 17:53

## 2025-02-17 NOTE — CASE MANAGEMENT
Case Management Assessment & Discharge Planning Note    Patient name Juan San  Location /-01 MRN [id number]  : 1955 Date 2025       Current Admission Date: 2025  Current Admission Diagnosis:Acute kidney injury (HCC)   Patient Active Problem List    Diagnosis Date Noted Date Diagnosed    Chronic diarrhea 2025     Decreased oral intake 2025     Bipolar II disorder (HCC) 2024     Elevated troponin I level 10/06/2024     Ambulatory dysfunction 10/06/2024     SIRS (systemic inflammatory response syndrome) (HCC) 10/06/2024     Hypokalemia 10/06/2024     Metabolic acidosis, increased anion gap 10/06/2024     Prostatitis 2024     Lower extremity edema 2024     Venous stasis ulcers ([facility]) 2024     Acute kidney injury (HCC) 2024     Failure of joint fusion ([facility]) 2024     Lactic acidosis 2024     Type 2 diabetes mellitus with hyperglycemia, without long-term current use of insulin ([facility]) 2024     Foraminal stenosis of lumbar region 2024     Discitis of lumbosacral region 2024     Iron deficiency anemia 10/10/2023     Acute on chronic bilateral low back pain with sciatica 10/09/2023     Anxiety and depression 10/09/2023     Hypertension 10/09/2023     Benign prostatic hyperplasia with urinary frequency 2023     Scrotal pain 2023     Lower urinary tract symptoms 2023     Status post lumbar spinal fusion 2023     Hyponatremia 2023     Gallstones 2023     Anemia 2023     Urinary retention 2023     Nausea and vomiting      Medical marijuana use      Chronic bilateral low back pain without sciatica 2023     Lumbar radiculopathy      Chronic pain syndrome 2022     Liver disease 08/10/2022     Hypertensive urgency 2022     Bronchitis, mucopurulent recurrent (HCC) 2022     Cirrhosis, alcoholic (HCC) 2022     Diabetic peripheral neuropathy (HCC)  07/29/2022     Herniated nucleus pulposus of lumbosacral region 07/29/2022     Thyroid cancer (HCC) 08/19/2021     Alcoholism in remission (HCC) 07/30/2021     Benzodiazepine dependence (HCC) 07/30/2021     Bilateral adhesive capsulitis of shoulders 07/30/2021     DM (diabetes mellitus) (HCC) 07/30/2021     Hypercholesterolemia 07/30/2021     Lumbar spondylosis 07/30/2021       LOS (days): 1  Geometric Mean LOS (GMLOS) (days): 3  Days to GMLOS:2     OBJECTIVE:    Risk of Unplanned Readmission Score: 60.53         Current admission status: Inpatient       Preferred Pharmacy:   [facility] [address] [place] [address] [address] [address]  Phone: [phone] Fax: [phone]    Primary Care Provider: RUIZ Hanks    Primary Insurance: [employer]  Secondary Insurance: [facility]    ASSESSMENT:  Active Health Care Proxies       Tatyana San Health Care Representative - Spouse   Primary Phone: [phone] (Mobile)                 Advance Directives  Does patient have a Health Care POA?: No  Was patient offered paperwork?: Yes  Does patient currently have a Health Care decision maker?: Yes, please see Health Care Proxy section  Does patient have Advance Directives?: No  Was patient offered paperwork?: Yes  Primary Contact: Hope Joe.         Readmission Root Cause  30 Day Readmission: No    Patient Information  Admitted from:: Home  Mental Status: Alert  During Assessment patient was accompanied by: Not accompanied during assessment  Assessment information provided by:: Patient  Primary Caregiver: Self  Support Systems: Self, Spouse/significant other, Children  County of Residence: [place]  What city do you live in?: [place]  Home entry access options. Select all that apply.: Stairs  Number of steps to enter home.: 1  Type of Current Residence: [facility]  Living Arrangements: Lives w/ Spouse/significant other  Is  patient a ?: Yes  Is patient active with [facility] ([facility])?: No    Activities of Daily Living Prior to Admission  Functional Status: Assistance  Completes ADLs independently?: No  Level of ADL dependence: Assistance  Ambulates independently?: No  Level of ambulatory dependence: Assistance  Does patient use assisted devices?: Yes  Assisted Devices (DME) used: Straight Cane, Walker, Shower Chair, Other (Comment) (Grab bars)  Does patient currently own DME?: Yes  What DME does the patient currently own?: Shower Chair, Straight Cane, Walker, Other (Comment) (Grab bars for shower.)  Does patient have a history of Outpatient Therapy (PT/OT)?: Yes  Does the patient have a history of Short-Term Rehab?: Yes  Does patient have a history of HHC?: Yes ([facility])  Does patient currently have HHC?: No         Patient Information Continued  Income Source: SSI/SSD  Does patient have prescription coverage?: Yes  Does patient receive dialysis treatments?: No  Does patient have a history of substance abuse?: Yes  Historical substance use preference: Alcohol/ETOH  Is patient currently in treatment for substance abuse?: N/A - sober  Does patient have a history of Mental Health Diagnosis?: No         Means of Transportation  Means of Transport to Appts:: Family transport          DISCHARGE DETAILS:    Discharge planning discussed with:: Pt  Freedom of Choice: Yes     CM contacted family/caregiver?: No- see comments (Pt is in contact with family.)  Were Treatment Team discharge recommendations reviewed with patient/caregiver?: Yes  Did patient/caregiver verbalize understanding of patient care needs?: Yes  Were patient/caregiver advised of the risks associated with not following Treatment Team discharge recommendations?: Yes         Requested Home Health Care         Is the patient interested in HHC at discharge?: No    DME Referral Provided  Referral made for DME?: No    Other Referral/Resources/Interventions  Provided:  Interventions: Other (Specify)  Referral Comments: Awaiting recs.                                                      Additional Comments: CM met with pt at bedside to discern discharge needs. Pt lives with spouse in a 1sh, 1ste, 1st fl setup. Pt reports pain and difficulty walking and needing assistance with ADLs recently. Per spouse, pt has had recent falls--in home pharmacy and at home. Spouse Tatyana is a HHA and assists with ADLs, rides, groceries. Pt uses and owns a walker, cane, shower chair, and grab bars for the shower. Reports hx of STR, HHC, and OPPT. Not current with HHC or HHAs. HHA was in the home, but has stopped, and family is searching for another HHA. Reports hx of D&A and inpatient psych more than two years ago. Family provides transport. Does not have medical POA. Spouse will be transport, pending discharge rec.

## 2025-02-17 NOTE — ASSESSMENT & PLAN NOTE
69-year-old male with history of lumbar radiculopathy, status post lumbar fusion, discitis, chronic osteomyelitis on suppressive antibiotics, type 2 diabetes, bipolar 2 disorder, iron deficiency anemia who presents with poor oral intake for 2 weeks found to have LIGIA. On NSAIDs chronically for pain. Also with chronic diarrhea in the setting of suppressive antibiotics.     Labs significant for LIGIA, creatinine 2.56 on admission, currently 2.04 after IV hydration. Anemia, hemoglobin 8.3, baseline appears to be 10-11. History of iron deficiency in the past. Was recommended to undergo EGD/colonoscopy in 2023, but not completed.    Suspect erosive gastritis or peptic ulcer disease in the setting of chronic NSAID use, cannot rule out malignancy given history of iron deficiency anemia.    -Continue Protonix 40 mg daily  -Continue IV fluids for treatment of LIGIA  -Recommend EGD/colonoscopy, tentative plan mid-to-late week as patient is currently being treated for LIGIA  -Avoid NSAIDs  -Encourage diet as tolerated

## 2025-02-17 NOTE — ASSESSMENT & PLAN NOTE
Anemia, hemoglobin 8.3, baseline appears to be 10-11. History of iron deficiency in the past.  He has no current overt bleeding. Was recommended to undergo EGD/colonoscopy in 2023, but not completed. Differential includes erosive esophagitis/gastritis/duodenitis, peptic ulcer, malabsorption, AVM, large polyp, malignancy.    -Plan for inpatient EGD/colonoscopy as discussed above  -Check iron panel  -He would likely benefit from IV iron infusions

## 2025-02-17 NOTE — ASSESSMENT & PLAN NOTE
He describes chronic diarrhea for months since starting on suppressive antibiotics for spinal infection. Low suspicion for infection given chronicity. Likely antibiotic associated diarrhea.    -Plan for colonoscopy as discussed above

## 2025-02-17 NOTE — UTILIZATION REVIEW
Initial Clinical Review    Admission: Date/Time/Statement:   Admission Orders (From admission, onward)       Ordered        02/16/25 1722  INPATIENT ADMISSION  Once                          Orders Placed This Encounter   Procedures    INPATIENT ADMISSION     Standing Status:   Standing     Number of Occurrences:   1     Level of Care:   Med Surg [16]     Estimated length of stay:   More than 2 Midnights     Certification:   I certify that inpatient services are medically necessary for this patient for a duration of greater than two midnights. See H&P and MD Progress Notes for additional information about the patient's course of treatment.     ED Arrival Information       Expected   -    Arrival   2/16/2025 15:47    Acuity   Emergent              Means of arrival   Ambulance    Escorted by   [facility]    Service   Hospitalist    Admission type   Emergency              Arrival complaint   passed out             Chief Complaint   Patient presents with    Failure To Thrive     Pt coming from home. Pt reports daughter is concerned for lack of nutrition the last few weeks. Pt reports losing both his brothers 2 weeks ago on the same day       Initial Presentation: 69 y.o. male to ED from home w/ PMHX lumbar spondylosis , HTN .  Pt c/o dec po intake x2 weeks . Depression r/t passing of family member . Has chronic back pain and scheduled for back sx in a few weeks at [facility]. Found to have Cr 2.56 , baseline <1 . Given 1l IVF in ED . Admitted IP status w/ LIGIA , dec po intake . Plan to cont IVF , recheck Cr in am , consult nephrology m check renal US . Cont suppressive doxycline . HTN cont antihypertensives . Dec po intake IV PPI , consult GI , diet as carlene .    Date:  2/17  Day 2: plan for RGD/colonoscopy on Wed/Thurs. C/o cont back pain . Dec po intake and chronic diarrhea. Cont IVF and potentially initiate clear liq diet tomorrow . Cr improved to 2.04 from 2.56. monitor in am .    2/17 GI Consult   Suspect  erosive gastritis or peptic ulcer disease in the setting of chronic NSAID use, cannot rule out malignancy given history of iron deficiency anemia. . Plan Protonix , IVF , check Fe panl , EGD/colonoscopy . Cont IVF and monitor CR .     Date: 2/18  Day 3: Has surpassed a 2nd midnight with active treatments and services.  IVF dec to 50 hr . Cr improved to 2.04 from 2.56. cont PPI . Plan per GI is for possible EGD on Wed/Thurs if cont to improve. Possible inc clear liq diet tomorrow . Anticipate discharge in >72 hrs to discharge location to be determined pending rehab evaluations.         ED Treatment-Medication Administration from 02/16/2025 1547 to 02/16/2025 1804         Date/Time Order Dose Route Action     02/16/2025 1624 sodium chloride 0.9 % bolus 1,000 mL 1,000 mL Intravenous New Bag            Scheduled Medications:  acetaminophen, 975 mg, Oral, Q8H CHRISTIE  clonazePAM, 1 mg, Oral, BID  doxycycline hyclate, 100 mg, Oral, BID  enoxaparin, 30 mg, Subcutaneous, Daily  escitalopram, 20 mg, Oral, Daily  finasteride, 5 mg, Oral, Daily  folic acid, 2,000 mcg, Oral, Daily  gabapentin, 200 mg, Oral, BID  hydrOXYzine HCL, 50 mg, Oral, BID  insulin lispro, 1-6 Units, Subcutaneous, TID AC  lidocaine, 1 patch, Topical, Daily  metoprolol succinate, 50 mg, Oral, BID  mirtazapine, 45 mg, Oral, HS  NIFEdipine, 30 mg, Oral, Daily  NIFEdipine, 90 mg, Oral, Daily  pantoprazole, 40 mg, Intravenous, Q24H CHRISTIE  polyethylene glycol, 17 g, Oral, Daily  pravastatin, 20 mg, Oral, Daily With Dinner  senna, 8.6 mg, Oral, BID      Continuous IV Infusions:  lactated ringers, 100 mL/hr, Intravenous, Continuous      PRN Meds:  busPIRone, 5 mg, Oral, BID PRN  cyclobenzaprine, 5 mg, Oral, TID PRN  traMADol, 50 mg, Oral, Q12H PRN      ED Triage Vitals   Temperature Pulse Respirations Blood Pressure SpO2 Pain Score   02/16/25 1548 02/16/25 1548 02/16/25 1548 02/16/25 1548 02/16/25 1548 02/16/25 2204   97.6 °F (36.4 °C) 78 18 90/54 99 % 2     Weight  (last 2 days)       Date/Time Weight    02/16/25 22:04:48 71 (156.53)    02/16/25 1811 69.4 (153)            Vital Signs (last 3 days)       Date/Time Temp Pulse Resp BP MAP (mmHg) SpO2 O2 Device Patient Position - Orthostatic VS [place] Coma Scale Score Pain    02/17/25 07:04:31 98.1 °F (36.7 °C) 63 18 147/66 93 99 % None (Room air) -- -- --    02/17/25 0617 -- -- -- -- -- -- -- -- -- 5    02/17/25 0015 -- -- -- -- -- -- None (Room air) -- 15 2    02/16/25 2215 -- -- -- -- -- -- -- -- -- 2    02/16/25 22:14:08 -- 78 -- 126/65 85 98 % -- -- -- --    02/16/25 22:04:48 97.7 °F (36.5 °C) 67 14 113/61 78 99 % -- Lying -- 2    02/16/25 18:21:02 96 °F (35.6 °C) 66 -- 98/49 65 99 % -- -- -- --    02/16/25 18:20:07 96 °F (35.6 °C) 68 -- 98/49 65 99 % -- -- -- --    02/16/25 1812 -- -- -- -- -- -- None (Room air) -- 14 --    02/16/25 1700 -- 64 16 105/55 76 100 % None (Room air) -- -- --    02/16/25 1630 -- 67 11 90/62 72 99 % None (Room air) -- -- --    02/16/25 1621 -- -- -- -- -- -- -- -- 15 --    02/16/25 1600 -- 73 14 107/57 78 100 % None (Room air) -- -- --    02/16/25 1548 97.6 °F (36.4 °C) 78 18 90/54 -- 99 % None (Room air) Sitting -- --              Pertinent Labs/Diagnostic Test Results:   Radiology:  No orders to display     Cardiology:  ECG 12 lead   Final Result by Feliberto Rendon MD (02/16 4139)   Sinus rhythm with Sinus arrhythmia   T wave abnormality, consider anterior ischemia   Abnormal ECG   When compared with ECG of 06-Oct-2024 11:56,   Nonspecific T wave abnormality now evident in Inferior leads   T wave inversion now evident in Anterior leads   Confirmed by Feliberto Rendon ([id number]) on 2/16/2025 5:41:38 PM        GI:  No orders to display           Results from last 7 days   Lab Units 02/17/25  0623 02/16/25  1612   WBC Thousand/uL 7.44 9.59   HEMOGLOBIN g/dL 8.3* 9.8*   HEMATOCRIT % 25.2* 29.9*   PLATELETS Thousands/uL 254 325   TOTAL NEUT ABS Thousands/µL 3.72 5.51         Results from last 7 days    Lab Units 02/17/25  0623 02/16/25  1612   SODIUM mmol/L 140 138   POTASSIUM mmol/L 3.1* 3.8   CHLORIDE mmol/L 112* 107   CO2 mmol/L 19* 17*   ANION GAP mmol/L 9 14*   BUN mg/dL 62* 72*   CREATININE mg/dL 2.04* 2.56*   EGFR ml/min/1.73sq m 32 24   CALCIUM mg/dL 8.0* 8.9     Results from last 7 days   Lab Units 02/17/25  0623 02/16/25  1612   AST U/L 22 32   ALT U/L 28 38   ALK PHOS U/L 45 55   TOTAL PROTEIN g/dL 4.7* 5.7*   ALBUMIN g/dL 2.9* 3.6   TOTAL BILIRUBIN mg/dL 0.40 0.42     Results from last 7 days   Lab Units 02/17/25  0749 02/16/25 2008   POC GLUCOSE mg/dl 70 189*     Results from last 7 days   Lab Units 02/17/25  0623 02/16/25  1612   GLUCOSE RANDOM mg/dL 62* 170*     Results from last 7 days   Lab Units 02/16/25 2041 02/16/25 1955 02/16/25  1612   HS TNI 0HR ng/L  --   --  6   HS TNI 2HR ng/L  --  6  --    HSTNI D2 ng/L  --  0  --    HS TNI 4HR ng/L 5  --   --    HSTNI D4 ng/L -1  --   --        Results from last 7 days   Lab Units 02/16/25  1737   PROTIME seconds 14.6   INR  1.09   PTT seconds 23     Results from last 7 days   Lab Units 02/16/25  1612   TSH 3RD GENERATON uIU/mL 6.241*     Results from last 7 days   Lab Units 02/16/25  1612   PROCALCITONIN ng/ml 0.31*     Results from last 7 days   Lab Units 02/16/25 2041 02/16/25  1620   LACTIC ACID mmol/L 2.9* 3.2*     Results from last 7 days   Lab Units 02/16/25  1937   CLARITY UA  Clear   COLOR UA  Yellow   SPEC GRAV UA  1.015   PH UA  5.5   GLUCOSE UA mg/dl 150 (3/20%)*   KETONES UA mg/dl Negative   BLOOD UA  Negative   PROTEIN UA mg/dl 30 (1+)*   NITRITE UA  Negative   BILIRUBIN UA  Negative   UROBILINOGEN UA (BE) mg/dl <2.0   LEUKOCYTES UA  Negative   WBC UA /hpf 0-1   RBC UA /hpf 0-1   BACTERIA UA /hpf Occasional   EPITHELIAL CELLS WET PREP /hpf Occasional     Results from last 7 days   Lab Units 02/16/25 1936   AMPH/METH  Negative   BARBITURATE UR  Negative   BENZODIAZEPINE UR  Negative   COCAINE UR  Negative   METHADONE URINE  Negative    OPIATE UR  Negative   PCP UR  Negative   THC UR  Positive*     Results from last 7 days   Lab Units 02/16/25  1612   ETHANOL LVL mg/dL <10   ACETAMINOPHEN LVL ug/mL 2*   SALICYLATE LVL mg/dL <5       Results from last 7 days   Lab Units 02/16/25  1612   BLOOD CULTURE  Received in Microbiology Lab. Culture in Progress.  Received in Microbiology Lab. Culture in Progress.       Past Medical History:   Diagnosis Date    Allergic     Anemia     Anxiety     Arthritis     Cancer (HCC)     Chronic back pain     Depression     Diabetes mellitus (HCC)     Hypertension     Liver disease     Mitral valve prolapse     Peripheral neuropathy     Neuropathy    PONV (postoperative nausea and vomiting)     Thyroid disease      Present on Admission:   Lumbar spondylosis   Hypertension   Iron deficiency anemia      Admitting Diagnosis: Dehydration [E86.0]  Acute kidney injury (HCC) [N17.9]  Decreased oral intake [R63.8]  Age/Sex: 69 y.o. male    Network Utilization Review Department  ATTENTION: Please call with any questions or concerns to [phone] and carefully listen to the prompts so that you are directed to the right person. All voicemails are confidential.   For Discharge needs, contact Care Management DC Support Team at [phone] opt. [phone]  Send all requests for admission clinical reviews, approved or denied determinations and any other requests to dedicated fax number below belonging to the campus where the patient is receiving treatment. List of dedicated fax numbers for the Facilities:  FACILITY NAME UR FAX NUMBER   ADMISSION DENIALS (Administrative/Medical Necessity) [phone]   DISCHARGE SUPPORT TEAM (NETWORK) [phone]   PARENT CHILD HEALTH (Maternity/NICU/Pediatrics) [phone]   [facility] [phone]   [facility] [phone]   On license of [facility] [phone]   [facility] [phone]   [facility]  [facility] [phone]   [facility] [phone]   [facility] [phone]   [facility] [phone]   [facility] [phone]   [facility] [phone]   [facility] [phone]   [facility] [phone]

## 2025-02-17 NOTE — ASSESSMENT & PLAN NOTE
Pt has non tender abdomen   Pt is using medical marijuana which in past was thought to be contributing to GI sx  Continue  IV PPI qd  Diet as tolerated   Pt has NSAID usage in setting of his back issues which I have avoid discontinuing and describes epigastric pain/burning  Discussed with GI possible EGD colon on Wed/Thurs if pt continues to imrpove

## 2025-02-17 NOTE — CONSULTS
Consultation - Gastroenterology   Name: Juan San 69 y.o. male I MRN: [id number]  Unit/Bed#: -01 I Date of Admission: 2/16/2025   Date of Service: 2/17/2025 I Hospital Day: 1   Inpatient consult to gastroenterology  Consult performed by: Valeri Hoffmann PA-C  Consult ordered by: Harinder Marin MD        Physician Requesting Evaluation: Shweta Shannon MD   Reason for Evaluation / Principal Problem: decreased oral intake    Assessment & Plan  Decreased oral intake  69-year-old male with history of lumbar radiculopathy, status post lumbar fusion, discitis, chronic osteomyelitis on suppressive antibiotics, type 2 diabetes, bipolar 2 disorder, iron deficiency anemia who presents with poor oral intake for 2 weeks found to have LIGIA. On NSAIDs chronically for pain. Also with chronic diarrhea in the setting of suppressive antibiotics.     Labs significant for LIGIA, creatinine 2.56 on admission, currently 2.04 after IV hydration. Anemia, hemoglobin 8.3, baseline appears to be 10-11. History of iron deficiency in the past. Was recommended to undergo EGD/colonoscopy in 2023, but not completed.    Suspect erosive gastritis or peptic ulcer disease in the setting of chronic NSAID use, cannot rule out malignancy given history of iron deficiency anemia.    -Continue Protonix 40 mg daily  -Continue IV fluids for treatment of LIGIA  -Recommend EGD/colonoscopy, tentative plan mid-to-late week as patient is currently being treated for LIGIA  -Avoid NSAIDs  -Encourage diet as tolerated  Iron deficiency anemia  Anemia, hemoglobin 8.3, baseline appears to be 10-11. History of iron deficiency in the past.  He has no current overt bleeding. Was recommended to undergo EGD/colonoscopy in 2023, but not completed. Differential includes erosive esophagitis/gastritis/duodenitis, peptic ulcer, malabsorption, AVM, large polyp, malignancy.    -Plan for inpatient EGD/colonoscopy as discussed above  -Check iron panel  -He would likely  benefit from IV iron infusions  Chronic diarrhea  He describes chronic diarrhea for months since starting on suppressive antibiotics for spinal infection. Low suspicion for infection given chronicity. Likely antibiotic associated diarrhea.    -Plan for colonoscopy as discussed above  Acute kidney injury (HCC)  In the setting of reduced oral intake, likely prerenal LIGIA.  Creatinine 2.56 on admission, currently 2 after IV fluids.    -Continue IV fluids for hydration  -Appreciate management per internal medicine  I have discussed the above management plan in detail with the primary service.     History of Present Illness   HPI:  Juan San is a 69 y.o. male with history of discitis of lumbosacral region, lumbar radiculopathy, status post lumbar fusion, chronic osteomyelitis, type 2 diabetes, bipolar 2 disorder, iron deficiency anemia who presents with poor oral intake.    He has been on suppressive doxycycline for months due to spinal infection.  He endorses diarrhea for months.  He describes loose watery stools, up to 3 times daily.  He denies blood in the stool.  He denies abdominal pain.  He does endorse poor appetite particularly over the past 2 weeks.  He denies postprandial abdominal pain..  No vomiting.  No complaints of significant reflux or dysphagia.  He does take ibuprofen multiple times a week, and he has been on this for months due to chronic pain.  He does not take PPI medication.  He has lost weight as well.    He has history of iron deficiency anemia and saw our physician in the office back in 2023.  He was advised to undergo EGD and colonoscopy, however these procedures were not done.  He has never had endoscopy before.    He is a former smoker.    Review of Systems   Constitutional:  Positive for appetite change, fatigue and unexpected weight change. Negative for chills and fever.   HENT:  Negative for ear pain and sore throat.    Eyes:  Negative for pain and visual disturbance.   Respiratory:   Negative for cough and shortness of breath.    Cardiovascular:  Negative for chest pain and palpitations.   Gastrointestinal:  Positive for diarrhea. Negative for abdominal pain and vomiting.   Genitourinary:  Negative for dysuria and hematuria.   Musculoskeletal:  Positive for back pain. Negative for arthralgias.   Skin:  Negative for color change and rash.   Neurological:  Positive for weakness. Negative for seizures and syncope.   All other systems reviewed and are negative.    Medical History Review: I have reviewed the patient's PMH, PSH, Social History, Family History, Meds, and Allergies     Objective :  Temp:  [96 °F (35.6 °C)-98.1 °F (36.7 °C)] 98.1 °F (36.7 °C)  HR:  [63-78] 63  BP: ()/(49-66) 147/66  Resp:  [11-18] 18  SpO2:  [98 %-100 %] 99 %  O2 Device: None (Room air)    Physical Exam  Vitals and nursing note reviewed.   Constitutional:       General: He is not in acute distress.     Appearance: He is well-developed.   HENT:      Head: Normocephalic and atraumatic.   Eyes:      Conjunctiva/sclera: Conjunctivae normal.   Cardiovascular:      Rate and Rhythm: Normal rate and regular rhythm.      Heart sounds: No murmur heard.  Pulmonary:      Effort: Pulmonary effort is normal. No respiratory distress.      Breath sounds: Normal breath sounds.   Abdominal:      Palpations: Abdomen is soft.      Tenderness: There is no abdominal tenderness.   Musculoskeletal:         General: No swelling.      Cervical back: Neck supple.   Skin:     General: Skin is warm and dry.   Neurological:      Mental Status: He is alert.   Psychiatric:         Mood and Affect: Mood normal.           Lab Results: I have reviewed the following results:CBC/BMP:   .     02/17/25  0623   WBC 7.44   HGB 8.3*   HCT 25.2*      SODIUM 140   K 3.1*   *   CO2 19*   BUN 62*   CREATININE 2.04*   GLUC 62*    , Creatinine Clearance: Estimated Creatinine Clearance: 34 mL/min (A) (by C-G formula based on SCr of 2.04 mg/dL (H)).,  LFTs:   .     02/17/25  0623   AST 22   ALT 28   ALB 2.9*   TBILI 0.40   ALKPHOS 45    , PTT/INR:  .     02/16/25  1737   PTT 23   INR 1.09    , Troponin,BNP:  .     02/16/25  1612 02/16/25  1955   HSTNI0 6  --    HSTNI2  --  6    , Lactic Acid:   .     02/16/25  2041   LACTICACID 2.9*

## 2025-02-17 NOTE — ASSESSMENT & PLAN NOTE
In the setting of reduced oral intake, likely prerenal LIGIA.  Creatinine 2.56 on admission, currently 2 after IV fluids.    -Continue IV fluids for hydration  -Appreciate management per internal medicine

## 2025-02-17 NOTE — TELEPHONE ENCOUNTER
Spoke to Tatyana, wife notified [facility] call our office for PA for back surgery, advised her to call [facility] and notify the doctor's office that is doing the surgery they need to call Kate to get PA.

## 2025-02-17 NOTE — ASSESSMENT & PLAN NOTE
Avoid hypotension, nephrotoxins, and NSAIDS if possible    IV fluids decreased to 50 cc/hr  Baseline Cr <1. Cr on presentation 2.56, markedly elevated from baseline  Bolused 1 L in the ER  Continue to trend  Improving

## 2025-02-17 NOTE — UTILIZATION REVIEW
NOTIFICATION OF INPATIENT ADMISSION   AUTHORIZATION REQUEST   SERVICING FACILITY:   [place] [address]  Tax ID: [id number]  NPI: [id number] ATTENDING PROVIDER:  Attending Name and NPI#: Shweta Shannon Md [[id number]]  Address: [address] [place] [address]  Phone: [phone]   ADMISSION INFORMATION:  Place of Service: Inpatient [facility]  Place of Service Code: 21  Inpatient Admission Date/Time: 2/16/25  5:22 PM  Discharge Date/Time: No discharge date for patient encounter.  Admitting Diagnosis Code/Description:  Dehydration [E86.0]  Acute kidney injury (HCC) [N17.9]  Decreased oral intake [R63.8]     UTILIZATION REVIEW CONTACT:  Anaid Guardado Utilization   Network Utilization Review Department  Phone: [phone]  Fax: [phone]  Email: [email or web address]  Contact for approvals/pending authorizations, clinical reviews, and discharge.     PHYSICIAN ADVISORY SERVICES:  Medical Necessity Denial & Uucp-je-Sacl Review  Phone: [phone]  Fax: [phone]  Email: [email or web address]     DISCHARGE SUPPORT TEAM:  For Patients Discharge Needs & Updates  Phone: [phone] opt. 2 Fax: [phone]  Email: [email or web address]

## 2025-02-17 NOTE — PLAN OF CARE

## 2025-02-17 NOTE — PROGRESS NOTES
Progress Note - Hospitalist   Name: Juan San 69 y.o. male I MRN: [id number]  Unit/Bed#: -01 I Date of Admission: 2/16/2025   Date of Service: 2/17/2025 I Hospital Day: 1    Assessment & Plan  Acute kidney injury (HCC)  Avoid hypotension, nephrotoxins, and NSAIDS if possible    IV fluids decreased to 50 cc/hr  Baseline Cr <1. Cr on presentation 2.56, markedly elevated from baseline  Bolused 1 L in the ER  Continue to trend  Improving     Lumbar spondylosis  Pt has hx of multiple spinal issues including discitis   Cont suppressive doxycycline  Cont to follow up w [facility]  Hypertension  Cont antihypertensives with hold parameters.  Decreased oral intake  Pt has non tender abdomen   Pt is using medical marijuana which in past was thought to be contributing to GI sx  Continue  IV PPI qd  Diet as tolerated   Pt has NSAID usage in setting of his back issues which I have avoid discontinuing and describes epigastric pain/burning  Discussed with GI possible EGD colon on Wed/Thurs if pt continues to imrpove      VTE Pharmacologic Prophylaxis: VTE Score: 5 High Risk (Score >/= 5) - Pharmacological DVT Prophylaxis Ordered: enoxaparin (Lovenox). Sequential Compression Devices Ordered.    Mobility:   Basic Mobility Inpatient Raw Score: 18  JH-HLM Goal: 6: Walk 10 steps or more  JH-HLM Achieved: 5: Stand (1 or more minutes)  JH-HLM Goal NOT achieved. Continue with multidisciplinary rounding and encourage appropriate mobility to improve upon JH-HLM goals.    Patient Centered Rounds: I performed bedside rounds with nursing staff today.   Discussions with Specialists or Other Care Team Provider: GI, CM    Education and Discussions with Family / Patient: Updated  (wife) via phone.    Current Length of Stay: 1 day(s)  Current Patient Status: Inpatient   Certification Statement: The patient will continue to require additional inpatient hospital stay due to antony  Discharge Plan: Anticipate discharge in >72 hrs to  discharge location to be determined pending rehab evaluations.    Code Status: Level 1 - Full Code    Fortino Martinez was seen and examined at bedside.  No acute events overnight.  Discussed plan of care.  All questions and concerns were answered and addressed.  States that he is feeling improved however continues to have significant back pain.  Continues to have a decreased oral intake and chronic diarrhea.  Discussed with GI will continue IV fluids at this time potentially initiating a clear liquid diet tomorrow with potential EGD colonoscopy on Wednesday however most likely Thursday.    Objective :  Temp:  [96 °F (35.6 °C)-97.7 °F (36.5 °C)] 97.7 °F (36.5 °C)  HR:  [64-78] 78  BP: ()/(49-65) 126/65  Resp:  [11-18] 14  SpO2:  [98 %-100 %] 98 %  O2 Device: None (Room air)    Body mass index is 23.8 kg/m².     Input and Output Summary (last 24 hours):     Intake/Output Summary (Last 24 hours) at 2/17/2025 0633  Last data filed at 2/17/2025 0201  Gross per 24 hour   Intake 960 ml   Output 750 ml   Net 210 ml       Physical Exam  Vitals and nursing note reviewed.   Constitutional:       General: He is not in acute distress.     Appearance: He is ill-appearing (chronically).   HENT:      Head: Normocephalic and atraumatic.   Cardiovascular:      Rate and Rhythm: Normal rate and regular rhythm.      Pulses: Normal pulses.      Heart sounds: Normal heart sounds.   Pulmonary:      Effort: Pulmonary effort is normal.      Breath sounds: Normal breath sounds.   Abdominal:      General: Abdomen is flat. Bowel sounds are normal.      Palpations: Abdomen is soft.   Musculoskeletal:      Right lower leg: No edema.      Left lower leg: No edema.   Skin:     General: Skin is warm.   Neurological:      General: No focal deficit present.      Mental Status: He is alert and oriented to person, place, and time.           Lines/Drains:              Lab Results: I have reviewed the following results:   Results from last 7  days   Lab Units 02/16/25  1612   WBC Thousand/uL 9.59   HEMOGLOBIN g/dL 9.8*   HEMATOCRIT % 29.9*   PLATELETS Thousands/uL 325   SEGS PCT % 57   LYMPHO PCT % 36   MONO PCT % 5   EOS PCT % 0     Results from last 7 days   Lab Units 02/16/25  1612   SODIUM mmol/L 138   POTASSIUM mmol/L 3.8   CHLORIDE mmol/L 107   CO2 mmol/L 17*   BUN mg/dL 72*   CREATININE mg/dL 2.56*   ANION GAP mmol/L 14*   CALCIUM mg/dL 8.9   ALBUMIN g/dL 3.6   TOTAL BILIRUBIN mg/dL 0.42   ALK PHOS U/L 55   ALT U/L 38   AST U/L 32   GLUCOSE RANDOM mg/dL 170*     Results from last 7 days   Lab Units 02/16/25  1737   INR  1.09     Results from last 7 days   Lab Units 02/16/25  2008   POC GLUCOSE mg/dl 189*         Results from last 7 days   Lab Units 02/16/25  2041 02/16/25  1620 02/16/25  1612   LACTIC ACID mmol/L 2.9* 3.2*  --    PROCALCITONIN ng/ml  --   --  0.31*       Recent Cultures (last 7 days):   Results from last 7 days   Lab Units 02/16/25  1612   BLOOD CULTURE  Received in Microbiology Lab. Culture in Progress.  Received in Microbiology Lab. Culture in Progress.       Imaging Results Review: No pertinent imaging studies reviewed.  Other Study Results Review: No additional pertinent studies reviewed.    Last 24 Hours Medication List:     Current Facility-Administered Medications:     acetaminophen (TYLENOL) tablet 975 mg, Q8H CHRISTIE    busPIRone (BUSPAR) tablet 5 mg, BID PRN    clonazePAM (KlonoPIN) tablet 1 mg, BID    cyclobenzaprine (FLEXERIL) tablet 5 mg, TID PRN    doxycycline hyclate (VIBRAMYCIN) capsule 100 mg, BID    enoxaparin (LOVENOX) subcutaneous injection 30 mg, Daily    escitalopram (LEXAPRO) tablet 20 mg, Daily    finasteride (PROSCAR) tablet 5 mg, Daily    folic acid (FOLVITE) tablet 2,000 mcg, Daily    gabapentin (NEURONTIN) capsule 200 mg, BID    hydrOXYzine HCL (ATARAX) tablet 50 mg, BID    insulin lispro (HumALOG/ADMELOG) 100 units/mL subcutaneous injection 1-6 Units, TID AC **AND** Fingerstick Glucose (POCT), TID AC     lactated ringers infusion, Continuous, Last Rate: 100 mL/hr (02/17/25 0433)    lidocaine (LIDODERM) 5 % patch 1 patch, Daily    metoprolol succinate (TOPROL-XL) 24 hr tablet 50 mg, BID    mirtazapine (REMERON) tablet 45 mg, HS    NIFEdipine (PROCARDIA XL) 24 hr tablet 30 mg, Daily    NIFEdipine (PROCARDIA XL) 24 hr tablet 90 mg, Daily    pantoprazole (PROTONIX) injection 40 mg, Q24H CHRISTIE    polyethylene glycol (MIRALAX) packet 17 g, Daily    pravastatin (PRAVACHOL) tablet 20 mg, Daily With Dinner    senna (SENOKOT) tablet 8.6 mg, BID    traMADol (ULTRAM) tablet 50 mg, Q12H PRN    Administrative Statements   Today, Patient Was Seen By: Shweta Shannon MD  I have spent a total time of 56 minutes in caring for this patient on the day of the visit/encounter including Diagnostic results, Prognosis, Risks and benefits of tx options, Instructions for management, Patient and family education, Importance of tx compliance, Risk factor reductions, Impressions, Counseling / Coordination of care, Documenting in the medical record, Reviewing/placing orders in the medical record (including tests, medications, and/or procedures), Obtaining or reviewing history  , and Communicating with other healthcare professionals .    **Please Note: This note may have been constructed using a voice recognition system.**

## 2025-02-18 LAB
ANION GAP SERPL CALCULATED.3IONS-SCNC: 10 MMOL/L (ref 4–13)
BUN SERPL-MCNC: 45 MG/DL (ref 5–25)
CALCIUM SERPL-MCNC: 7.7 MG/DL (ref 8.4–10.2)
CHLORIDE SERPL-SCNC: 110 MMOL/L (ref 96–108)
CO2 SERPL-SCNC: 20 MMOL/L (ref 21–32)
CREAT SERPL-MCNC: 1.55 MG/DL (ref 0.6–1.3)
ERYTHROCYTE [DISTWIDTH] IN BLOOD BY AUTOMATED COUNT: 18.6 % (ref 11.6–15.1)
GFR SERPL CREATININE-BSD FRML MDRD: 45 ML/MIN/1.73SQ M
GLUCOSE SERPL-MCNC: 108 MG/DL (ref 65–140)
GLUCOSE SERPL-MCNC: 126 MG/DL (ref 65–140)
GLUCOSE SERPL-MCNC: 152 MG/DL (ref 65–140)
GLUCOSE SERPL-MCNC: 155 MG/DL (ref 65–140)
HCT VFR BLD AUTO: 24.8 % (ref 36.5–49.3)
HGB BLD-MCNC: 8.3 G/DL (ref 12–17)
MAGNESIUM SERPL-MCNC: 1.1 MG/DL (ref 1.9–2.7)
MCH RBC QN AUTO: 25.8 PG (ref 26.8–34.3)
MCHC RBC AUTO-ENTMCNC: 33.5 G/DL (ref 31.4–37.4)
MCV RBC AUTO: 77 FL (ref 82–98)
PLATELET # BLD AUTO: 282 THOUSANDS/UL (ref 149–390)
PMV BLD AUTO: 10.7 FL (ref 8.9–12.7)
POTASSIUM SERPL-SCNC: 3.1 MMOL/L (ref 3.5–5.3)
RBC # BLD AUTO: 3.22 MILLION/UL (ref 3.88–5.62)
SODIUM SERPL-SCNC: 140 MMOL/L (ref 135–147)
WBC # BLD AUTO: 6.74 THOUSAND/UL (ref 4.31–10.16)

## 2025-02-18 PROCEDURE — 80048 BASIC METABOLIC PNL TOTAL CA: CPT | Performed by: STUDENT IN AN ORGANIZED HEALTH CARE EDUCATION/TRAINING PROGRAM

## 2025-02-18 PROCEDURE — 82948 REAGENT STRIP/BLOOD GLUCOSE: CPT

## 2025-02-18 PROCEDURE — 97116 GAIT TRAINING THERAPY: CPT

## 2025-02-18 PROCEDURE — 97163 PT EVAL HIGH COMPLEX 45 MIN: CPT

## 2025-02-18 PROCEDURE — 99232 SBSQ HOSP IP/OBS MODERATE 35: CPT | Performed by: PHYSICIAN ASSISTANT

## 2025-02-18 PROCEDURE — 97167 OT EVAL HIGH COMPLEX 60 MIN: CPT

## 2025-02-18 PROCEDURE — 85027 COMPLETE CBC AUTOMATED: CPT | Performed by: STUDENT IN AN ORGANIZED HEALTH CARE EDUCATION/TRAINING PROGRAM

## 2025-02-18 PROCEDURE — 99232 SBSQ HOSP IP/OBS MODERATE 35: CPT | Performed by: INTERNAL MEDICINE

## 2025-02-18 PROCEDURE — 83735 ASSAY OF MAGNESIUM: CPT | Performed by: STUDENT IN AN ORGANIZED HEALTH CARE EDUCATION/TRAINING PROGRAM

## 2025-02-18 RX ORDER — POTASSIUM CHLORIDE 1500 MG/1
40 TABLET, EXTENDED RELEASE ORAL
Status: COMPLETED | OUTPATIENT
Start: 2025-02-18 | End: 2025-02-18

## 2025-02-18 RX ORDER — POTASSIUM CHLORIDE 1500 MG/1
40 TABLET, EXTENDED RELEASE ORAL ONCE
Status: COMPLETED | OUTPATIENT
Start: 2025-02-18 | End: 2025-02-18

## 2025-02-18 RX ORDER — MAGNESIUM SULFATE HEPTAHYDRATE 40 MG/ML
4 INJECTION, SOLUTION INTRAVENOUS ONCE
Status: COMPLETED | OUTPATIENT
Start: 2025-02-18 | End: 2025-02-18

## 2025-02-18 RX ORDER — SODIUM CHLORIDE, SODIUM LACTATE, POTASSIUM CHLORIDE, CALCIUM CHLORIDE 600; 310; 30; 20 MG/100ML; MG/100ML; MG/100ML; MG/100ML
100 INJECTION, SOLUTION INTRAVENOUS CONTINUOUS
Status: DISCONTINUED | OUTPATIENT
Start: 2025-02-18 | End: 2025-02-20

## 2025-02-18 RX ADMIN — DOXYCYCLINE 100 MG: 100 CAPSULE ORAL at 09:08

## 2025-02-18 RX ADMIN — SODIUM CHLORIDE, SODIUM LACTATE, POTASSIUM CHLORIDE, AND CALCIUM CHLORIDE 100 ML/HR: .6; .31; .03; .02 INJECTION, SOLUTION INTRAVENOUS at 12:11

## 2025-02-18 RX ADMIN — OXYCODONE HYDROCHLORIDE 5 MG: 5 TABLET ORAL at 17:19

## 2025-02-18 RX ADMIN — NIFEDIPINE 90 MG: 30 TABLET, FILM COATED, EXTENDED RELEASE ORAL at 09:08

## 2025-02-18 RX ADMIN — FOLIC ACID 2000 MCG: 1 TABLET ORAL at 09:07

## 2025-02-18 RX ADMIN — LIDOCAINE 5% 1 PATCH: 700 PATCH TOPICAL at 09:13

## 2025-02-18 RX ADMIN — POLYETHYLENE GLYCOL 3350, SODIUM SULFATE ANHYDROUS, SODIUM BICARBONATE, SODIUM CHLORIDE, POTASSIUM CHLORIDE 4000 ML: 236; 22.74; 6.74; 5.86; 2.97 POWDER, FOR SOLUTION ORAL at 16:21

## 2025-02-18 RX ADMIN — OXYCODONE HYDROCHLORIDE 5 MG: 5 TABLET ORAL at 23:57

## 2025-02-18 RX ADMIN — POTASSIUM CHLORIDE 40 MEQ: 1500 TABLET, EXTENDED RELEASE ORAL at 17:19

## 2025-02-18 RX ADMIN — DOXYCYCLINE 100 MG: 100 CAPSULE ORAL at 17:19

## 2025-02-18 RX ADMIN — STANDARDIZED SENNA CONCENTRATE 8.6 MG: 8.6 TABLET ORAL at 09:08

## 2025-02-18 RX ADMIN — NIFEDIPINE 30 MG: 30 TABLET, FILM COATED, EXTENDED RELEASE ORAL at 09:08

## 2025-02-18 RX ADMIN — POTASSIUM CHLORIDE 40 MEQ: 1500 TABLET, EXTENDED RELEASE ORAL at 12:11

## 2025-02-18 RX ADMIN — SODIUM CHLORIDE, SODIUM LACTATE, POTASSIUM CHLORIDE, AND CALCIUM CHLORIDE 100 ML/HR: .6; .31; .03; .02 INJECTION, SOLUTION INTRAVENOUS at 14:24

## 2025-02-18 RX ADMIN — MAGNESIUM SULFATE HEPTAHYDRATE 4 G: 40 INJECTION, SOLUTION INTRAVENOUS at 09:20

## 2025-02-18 RX ADMIN — METOPROLOL SUCCINATE 50 MG: 50 TABLET, EXTENDED RELEASE ORAL at 21:20

## 2025-02-18 RX ADMIN — POTASSIUM CHLORIDE 40 MEQ: 1500 TABLET, EXTENDED RELEASE ORAL at 09:08

## 2025-02-18 RX ADMIN — MIRTAZAPINE 45 MG: 15 TABLET, FILM COATED ORAL at 21:20

## 2025-02-18 RX ADMIN — ACETAMINOPHEN 975 MG: 325 TABLET, FILM COATED ORAL at 05:31

## 2025-02-18 RX ADMIN — ESCITALOPRAM OXALATE 20 MG: 20 TABLET ORAL at 09:08

## 2025-02-18 RX ADMIN — TRAMADOL HYDROCHLORIDE 50 MG: 50 TABLET, COATED ORAL at 09:08

## 2025-02-18 RX ADMIN — ENOXAPARIN SODIUM 30 MG: 100 INJECTION SUBCUTANEOUS at 09:08

## 2025-02-18 RX ADMIN — GABAPENTIN 300 MG: 300 CAPSULE ORAL at 09:08

## 2025-02-18 RX ADMIN — CLONAZEPAM 1 MG: 1 TABLET ORAL at 17:19

## 2025-02-18 RX ADMIN — HYDROXYZINE HYDROCHLORIDE 50 MG: 25 TABLET, FILM COATED ORAL at 09:08

## 2025-02-18 RX ADMIN — INSULIN LISPRO 1 UNITS: 100 INJECTION, SOLUTION INTRAVENOUS; SUBCUTANEOUS at 12:11

## 2025-02-18 RX ADMIN — GABAPENTIN 300 MG: 300 CAPSULE ORAL at 17:19

## 2025-02-18 RX ADMIN — CLONAZEPAM 1 MG: 1 TABLET ORAL at 09:08

## 2025-02-18 RX ADMIN — PRAVASTATIN SODIUM 20 MG: 20 TABLET ORAL at 17:19

## 2025-02-18 RX ADMIN — STANDARDIZED SENNA CONCENTRATE 8.6 MG: 8.6 TABLET ORAL at 17:19

## 2025-02-18 RX ADMIN — INSULIN LISPRO 1 UNITS: 100 INJECTION, SOLUTION INTRAVENOUS; SUBCUTANEOUS at 17:20

## 2025-02-18 RX ADMIN — POLYETHYLENE GLYCOL 3350 17 G: 17 POWDER, FOR SOLUTION ORAL at 09:08

## 2025-02-18 RX ADMIN — FINASTERIDE 5 MG: 5 TABLET, FILM COATED ORAL at 09:08

## 2025-02-18 RX ADMIN — PANTOPRAZOLE SODIUM 40 MG: 40 INJECTION, POWDER, FOR SOLUTION INTRAVENOUS at 09:08

## 2025-02-18 RX ADMIN — ACETAMINOPHEN 975 MG: 325 TABLET, FILM COATED ORAL at 21:20

## 2025-02-18 RX ADMIN — GABAPENTIN 300 MG: 300 CAPSULE ORAL at 21:20

## 2025-02-18 RX ADMIN — METOPROLOL SUCCINATE 50 MG: 50 TABLET, EXTENDED RELEASE ORAL at 09:08

## 2025-02-18 RX ADMIN — HYDROXYZINE HYDROCHLORIDE 50 MG: 25 TABLET, FILM COATED ORAL at 17:19

## 2025-02-18 NOTE — ASSESSMENT & PLAN NOTE
Avoid hypotension, nephrotoxins, and NSAIDS if possible    Baseline Cr <1. Cr on presentation 2.56, markedly elevated from baseline  Improved to 1.55 with IVF  Plan for EGD/colonoscopy tomorrow.  We will be more aggressive with IVF and electrolyte repletion given he will undergo bowel prep.

## 2025-02-18 NOTE — PLAN OF CARE
Problem: OCCUPATIONAL THERAPY ADULT  Goal: Performs self-care activities at highest level of function for planned discharge setting.  See evaluation for individualized goals.  Description: Treatment Interventions: ADL retraining, Functional transfer training, UE strengthening/ROM, Endurance training, Cognitive reorientation, Patient/family training, Equipment evaluation/education, Compensatory technique education, Continued evaluation          See flowsheet documentation for full assessment, interventions and recommendations.   Note: Limitation: Decreased ADL status, Decreased UE strength, Decreased cognition, Decreased Safe judgement during ADL, Decreased endurance, Decreased high-level ADLs, Decreased self-care trans  Prognosis: Fair  Assessment: Pt is a 69 y.o. male seen for OT evaluation at [facility], admitted 2/16/2025 w/ Acute kidney injury (HCC). Extensive chart review completed by OT. Comorbidities affecting the pt's functional performance include a significant PMH of: Lumbar spondylosis, anemia, LIGIA, alcoholism, cirrhosis, diabetic peripheral neuropathy, DM, herniated nucleus pulposus of lumbosacral region, hypercholesterolemia, thyroid cancer, chronic pain syndrome, urinary retention, hyponatremia, hypokalemia, bipolar II disorder. Personal factors affecting pt at time of IE include: steps to enter environment, limited home support, difficulty performing ADLS, difficulty performing IADLS , limited insight into deficits, flat affect, health management , and environment. PTA pt was living in a 1 SH w/ 1 CECILIO, was  A w/ ADLs and A w/ IADLS, and IND with functional mobility with use of rollator, (-) driving, (+) home alone during day while spouse is home. Upon OT IE pt demonstrated min Ax1 for functional transfers, min Ax1 for functional mobility with RW, modA for UB ADLs and modA for LB ADLS 2* the following deficits impacting occupational performance: weakness, decreased strength, decreased balance, decreased  tolerance, impaired memory, impaired sequencing, impaired problem solving, decreased safety awareness, and increased pain. Based on OT IE, pt demonstrates the need for continued skilled OT tx focused on eating, grooming, bathing/shower, toilet hygiene, dressing, functional mobility, and clothing management during their stay in the hospital to address the stated deficits and maximize their level of functional independence w/ their ADLS and functional mobility. The patient's raw score on the AM-PAC Daily Activity inpatient short form is 17, standardized score is 37.26, less than 39.4. Patients at this level are likely to benefit from DC to post-acute rehabilitation services. However, please refer to therapist recommendation for discharge planning given other factors that may influence destination. At this time, OT recommendations at time of discharge are DC with Level II - Moderate Rehab Resource Intensity resources.     Rehab Resource Intensity Level, OT: II (Moderate Resource Intensity)

## 2025-02-18 NOTE — ASSESSMENT & PLAN NOTE
In the setting of reduced oral intake, likely prerenal LIGIA.      -Continue IV fluids for hydration  -Appreciate management per internal medicine

## 2025-02-18 NOTE — PROGRESS NOTES
Progress Note - Hospitalist   Name: Juan San 69 y.o. male I MRN: [id number]  Unit/Bed#: -01 I Date of Admission: 2/16/2025   Date of Service: 2/18/2025 I Hospital Day: 2    Assessment & Plan  Acute kidney injury (HCC)  Avoid hypotension, nephrotoxins, and NSAIDS if possible    Baseline Cr <1. Cr on presentation 2.56, markedly elevated from baseline  Improved to 1.55 with IVF  Plan for EGD/colonoscopy tomorrow.  We will be more aggressive with IVF and electrolyte repletion given he will undergo bowel prep.  Lumbar spondylosis  Pt has hx of multiple spinal issues including discitis   Cont suppressive doxycycline  Cont to follow up w [facility]  Hypertension  Cont antihypertensives with hold parameters.  Decreased oral intake  Pt has non tender abdomen   Pt is using medical marijuana which in past was thought to be contributing to GI sx  Continue  IV PPI qd  Diet as tolerated   Pt has NSAID usage in setting of his back issues which I have avoid discontinuing and describes epigastric pain/burning  Plan for EGD/colonoscopy tomorrow    Iron deficiency anemia  Mild,  possibly in setting of erosive esophagitis    Chronic diarrhea  Plan for colonoscopy tomorrow    VTE Pharmacologic Prophylaxis: VTE Score: 5 High Risk (Score >/= 5) - Pharmacological DVT Prophylaxis Ordered: enoxaparin (Lovenox). Sequential Compression Devices Ordered.    Mobility:   Basic Mobility Inpatient Raw Score: 17  JH-HLM Goal: 5: Stand one or more mins  JH-HLM Achieved: 6: Walk 10 steps or more  JH-HLM Goal achieved. Continue to encourage appropriate mobility.    Patient Centered Rounds: I performed bedside rounds with nursing staff today.   Discussions with Specialists or Other Care Team Provider: Neurology    Education and Discussions with Family / Patient: Updated family at bedside    Current Length of Stay: 2 day(s)  Current Patient Status: Inpatient   Certification Statement: The patient will continue to require additional inpatient  hospital stay due to EGD/colonoscopy tomorrow  Discharge Plan: Anticipate discharge in 48-72 hrs to home.    Code Status: Level 1 - Full Code    Subjective   No acute events overnight. Still has diarrhea, and burning with eating    Objective :  Temp:  [97.6 °F (36.4 °C)-98.4 °F (36.9 °C)] 97.6 °F (36.4 °C)  HR:  [58-70] 58  BP: (102-158)/(52-78) 158/78  Resp:  [12-18] 12  SpO2:  [92 %-100 %] 99 %  O2 Device: None (Room air)    Body mass index is 23.8 kg/m².     Input and Output Summary (last 24 hours):     Intake/Output Summary (Last 24 hours) at 2/18/2025 1420  Last data filed at 2/18/2025 1106  Gross per 24 hour   Intake 1080 ml   Output 1225 ml   Net -145 ml       Physical Exam  Vitals and nursing note reviewed.   Constitutional:       Appearance: Normal appearance.   HENT:      Head: Normocephalic and atraumatic.      Mouth/Throat:      Mouth: Mucous membranes are moist.      Pharynx: Oropharynx is clear. No oropharyngeal exudate.   Eyes:      Extraocular Movements: Extraocular movements intact.   Cardiovascular:      Rate and Rhythm: Normal rate and regular rhythm.      Pulses: Normal pulses.      Heart sounds: Normal heart sounds. No murmur heard.     No friction rub. No gallop.   Pulmonary:      Effort: Pulmonary effort is normal. No respiratory distress.      Breath sounds: Normal breath sounds. No stridor. No wheezing or rales.   Abdominal:      General: Abdomen is flat. Bowel sounds are normal. There is no distension.      Palpations: Abdomen is soft.      Tenderness: There is no abdominal tenderness.   Musculoskeletal:      Right lower leg: No edema.      Left lower leg: No edema.   Skin:     General: Skin is warm and dry.   Neurological:      General: No focal deficit present.      Mental Status: He is alert and oriented to person, place, and time.           Lines/Drains:              Lab Results: I have reviewed the following results:   Results from last 7 days   Lab Units 02/18/25  0403 02/17/25  0694    WBC Thousand/uL 6.74 7.44   HEMOGLOBIN g/dL 8.3* 8.3*   HEMATOCRIT % 24.8* 25.2*   PLATELETS Thousands/uL 282 254   SEGS PCT %  --  49   LYMPHO PCT %  --  42   MONO PCT %  --  6   EOS PCT %  --  1     Results from last 7 days   Lab Units 02/18/25  0403 02/17/25  0623   SODIUM mmol/L 140 140   POTASSIUM mmol/L 3.1* 3.1*   CHLORIDE mmol/L 110* 112*   CO2 mmol/L 20* 19*   BUN mg/dL 45* 62*   CREATININE mg/dL 1.55* 2.04*   ANION GAP mmol/L 10 9   CALCIUM mg/dL 7.7* 8.0*   ALBUMIN g/dL  --  2.9*   TOTAL BILIRUBIN mg/dL  --  0.40   ALK PHOS U/L  --  45   ALT U/L  --  28   AST U/L  --  22   GLUCOSE RANDOM mg/dL 126 62*     Results from last 7 days   Lab Units 02/16/25  1737   INR  1.09     Results from last 7 days   Lab Units 02/18/25  1111 02/18/25  0822 02/17/25  1712 02/17/25  1152 02/17/25  0749 02/16/25  2008   POC GLUCOSE mg/dl 155* 108 80 109 70 189*         Results from last 7 days   Lab Units 02/16/25  2041 02/16/25  1620 02/16/25  1612   LACTIC ACID mmol/L 2.9* 3.2*  --    PROCALCITONIN ng/ml  --   --  0.31*       Recent Cultures (last 7 days):   Results from last 7 days   Lab Units 02/16/25  1612   BLOOD CULTURE  No Growth at 24 hrs.   GRAM STAIN RESULT  Gram positive cocci in clusters*       Imaging Results Review: No pertinent imaging studies reviewed.  Other Study Results Review: No additional pertinent studies reviewed.    Last 24 Hours Medication List:     Current Facility-Administered Medications:     acetaminophen (TYLENOL) tablet 975 mg, Q8H CHRISTIE    busPIRone (BUSPAR) tablet 5 mg, BID PRN    clonazePAM (KlonoPIN) tablet 1 mg, BID    cyclobenzaprine (FLEXERIL) tablet 5 mg, TID PRN    doxycycline hyclate (VIBRAMYCIN) capsule 100 mg, BID    enoxaparin (LOVENOX) subcutaneous injection 30 mg, Daily    escitalopram (LEXAPRO) tablet 20 mg, Daily    finasteride (PROSCAR) tablet 5 mg, Daily    folic acid (FOLVITE) tablet 2,000 mcg, Daily    gabapentin (NEURONTIN) capsule 300 mg, TID    hydrOXYzine HCL (ATARAX)  tablet 50 mg, BID    insulin lispro (HumALOG/ADMELOG) 100 units/mL subcutaneous injection 1-6 Units, TID AC **AND** Fingerstick Glucose (POCT), TID AC    lactated ringers infusion, Continuous, Last Rate: 100 mL/hr (02/18/25 1211)    lidocaine (LIDODERM) 5 % patch 1 patch, Daily    metoprolol succinate (TOPROL-XL) 24 hr tablet 50 mg, BID    mirtazapine (REMERON) tablet 45 mg, HS    NIFEdipine (PROCARDIA XL) 24 hr tablet 30 mg, Daily    NIFEdipine (PROCARDIA XL) 24 hr tablet 90 mg, Daily    oxyCODONE (ROXICODONE) IR tablet 5 mg, Q6H PRN    pantoprazole (PROTONIX) injection 40 mg, Q24H CHRISTIE    polyethylene glycol (GOLYTELY) bowel prep 4,000 mL, Once    polyethylene glycol (MIRALAX) packet 17 g, Daily    potassium chloride (Klor-Con M20) CR tablet 40 mEq, TID With Meals    pravastatin (PRAVACHOL) tablet 20 mg, Daily With Dinner    senna (SENOKOT) tablet 8.6 mg, BID    traMADol (ULTRAM) tablet 50 mg, Q12H PRN    Administrative Statements   Today, Patient Was Seen By: Dada Hedrick PA-C      **Please Note: This note may have been constructed using a voice recognition system.**

## 2025-02-18 NOTE — PROGRESS NOTES
Progress Note - Gastroenterology   Name: Juan San 69 y.o. male I MRN: [id number]  Unit/Bed#: -01 I Date of Admission: 2/16/2025   Date of Service: 2/18/2025 I Hospital Day: 2    Assessment & Plan  Decreased oral intake  69-year-old male with history of lumbar radiculopathy, status post lumbar fusion, discitis, chronic osteomyelitis on suppressive antibiotics, type 2 diabetes, bipolar 2 disorder, iron deficiency anemia who presents with poor oral intake for 2 weeks found to have LIGIA.   On NSAIDs chronically for pain. Also with chronic diarrhea in the setting of suppressive antibiotics.     Labs significant for LIGIA, creatinine 2.56 on admission, currently 1.55. Anemia, hemoglobin 8.3, baseline appears to be 10-11.   EGD/colonoscopy in 2023, but not completed.    Suspect erosive gastritis or peptic ulcer disease in the setting of chronic NSAID use, cannot rule out malignancy given history of iron deficiency anemia.    -Continue Protonix 40 mg daily  -Continue IV fluids for treatment of LIGIA  -Recommend EGD/colonoscopy, tentative plan mid-to-late week as patient is currently being treated for LIGIA  -Avoid NSAIDs  -Encourage diet as tolerated  Iron deficiency anemia  Anemia, hemoglobin 8.3, baseline appears to be 10-11. History of iron deficiency in the past.  He has no current overt bleeding. Was recommended to undergo EGD/colonoscopy in 2023, but not completed.   Iron panel 2/17; iron 104, iron sat 86, TIBC 120.4, ferritin 137.  Differential includes erosive esophagitis/gastritis/duodenitis, peptic ulcer, malabsorption, AVM, large polyp, malignancy.    -Monitor hemoglobin, transfuse less than 7.  -Plan for inpatient EGD/colonoscopy as discussed above  Chronic diarrhea  He describes chronic diarrhea for months since starting on suppressive antibiotics for spinal infection. Low suspicion for infection given chronicity. Likely antibiotic associated diarrhea.    Potassium 3.1, magnesium 1.1 with a.m. labs  today.    -Replete electrolytes before patient may tolerate colon prep.  -Plan for colonoscopy as discussed above  Acute kidney injury (HCC)  In the setting of reduced oral intake, likely prerenal LIGIA.      -Continue IV fluids for hydration  -Appreciate management per internal medicine        Subjective   Patient denies abdominal pain, nausea or vomiting.  Patient states he has had diarrhea now for couple days.  He does complain of lower back pain.  Discussed with patient encouraging him out of bed and ambulating.  Discussed with patient we are currently monitoring his lab work in order to plan for possible inpatient EGD and colonoscopy.    Objective :  Temp:  [97.7 °F (36.5 °C)-98.4 °F (36.9 °C)] 98.4 °F (36.9 °C)  HR:  [62-70] 68  BP: (102-150)/(52-74) 150/71  Resp:  [16-18] 16  SpO2:  [92 %-100 %] 100 %  O2 Device: None (Room air)    Physical Exam  Vitals and nursing note reviewed.   HENT:      Head: Normocephalic.      Nose: Nose normal.      Mouth/Throat:      Mouth: Mucous membranes are dry.   Eyes:      General: No scleral icterus.     Extraocular Movements: Extraocular movements intact.   Cardiovascular:      Rate and Rhythm: Normal rate and regular rhythm.      Heart sounds: Normal heart sounds.   Pulmonary:      Breath sounds: Normal breath sounds.   Abdominal:      General: Bowel sounds are normal. There is no distension.      Palpations: Abdomen is soft.      Tenderness: There is no abdominal tenderness.   Musculoskeletal:         General: No swelling. Normal range of motion.      Cervical back: Normal range of motion.      Comments: Patient does complain of lower back pain.   Skin:     General: Skin is warm and dry.      Coloration: Skin is not jaundiced.   Neurological:      Mental Status: He is alert and oriented to person, place, and time.   Psychiatric:         Mood and Affect: Mood normal.           Lab Results: I have reviewed the following results:

## 2025-02-18 NOTE — ASSESSMENT & PLAN NOTE
He describes chronic diarrhea for months since starting on suppressive antibiotics for spinal infection. Low suspicion for infection given chronicity. Likely antibiotic associated diarrhea.    Potassium 3.1, magnesium 1.1 with a.m. labs today.    -Replete electrolytes before patient may tolerate colon prep.  -Plan for colonoscopy as discussed above

## 2025-02-18 NOTE — PHYSICAL THERAPY NOTE
PHYSICAL THERAPY EVALUATION NOTE          Patient Name: Juan San  Today's Date: 2025    AGE:   69 y.o.  Mrn:   [id number]  ADMIT DX:  Dehydration [E86.0]  Acute kidney injury (HCC) [N17.9]  Decreased oral intake [R63.8]    Past Medical History:   Diagnosis Date    Allergic     Anemia     Anxiety     Arthritis     Cancer (HCC)     Chronic back pain     Depression     Diabetes mellitus (HCC)     Hypertension     Liver disease     Mitral valve prolapse     Peripheral neuropathy     Neuropathy    PONV (postoperative nausea and vomiting)     Thyroid disease      Length Of Stay: 2  PHYSICAL THERAPY EVALUATION :   Patient's identity confirmed via 2 patient identifiers (full name and ) at start of session       25 1328   PT Last Visit   PT Visit Date 25   Note Type   Note type Evaluation   Pain Assessment   Pain Assessment Tool 0-10   Pain Score 10 - Worst Possible Pain   Pain Location/Orientation Orientation: Lower;Location: Back   Patient's Stated Pain Goal No pain   Hospital Pain Intervention(s) Repositioned;Ambulation/increased activity;Emotional support;Heat applied   Restrictions/Precautions   Weight Bearing Precautions Per Order No   Other Precautions Chair Alarm;Bed Alarm;Fall Risk;Multiple lines;Spinal precautions;Pain   Home Living   Type of Home House   Home Layout One level  (1 CECILIO)   Bathroom Shower/Tub Walk-in shower   Bathroom Equipment Grab bars in shower;Shower chair   Home Equipment Walker  (RW, rollator)   Additional Comments Juan reports ambulating w/ rollator PTA   Prior Function   Level of [place] Independent with functional mobility;Needs assistance with ADLs   Lives With (S)  Spouse  (Spouse reports she works long hours during the day, so Juan is often home alone)   Receives Help From Family   IADLs Family/Friend/Other provides transportation;Family/Friend/Other provides medication  management;Family/Friend/Other provides meals   Falls in the last 6 months 5 to 10   Vocational Retired   General   Family/Caregiver Present Yes  (Wife, Tatyana)   Cognition   Arousal/Participation Alert   Orientation Level Oriented to person;Oriented to place;Oriented to time   Memory Decreased recall of recent events   Following Commands Follows one step commands with increased time or repetition   Comments Pleasant and motivated.   RLE Assessment   RLE Assessment WFL   Strength RLE   RLE Overall Strength 3+/5   LLE Assessment   LLE Assessment WFL   Strength LLE   LLE Overall Strength 3+/5   Vision-Basic Assessment   Current Vision No visual deficits   Bed Mobility   Additional Comments Juan is already OOB in recliner chair upon arrival   Transfers   Sit to Stand 4  Minimal assistance   Additional items Assist x 1;Increased time required   Stand to Sit 4  Minimal assistance   Additional items Assist x 1   Ambulation/Elevation   Gait pattern Decreased foot clearance;Forward Flexion;Knees flexed;Excessively slow   Gait Assistance 4  Minimal assist   Additional items Assist x 1   Assistive Device Rolling walker   Distance 10 ft   Ambulation/Elevation Additional Comments Pt reports fatigue and needing to sit down   Balance   Static Sitting Fair +   Static Standing Poor +   Ambulatory Poor +   Activity Tolerance   Activity Tolerance Patient limited by pain  (chronic LBP)   Medical Staff Made Aware LIAM Lopez   Nurse Made Aware NATE Gutierrez   Assessment   Problem List Decreased strength;Decreased endurance;Impaired balance;Decreased mobility;Pain   Assessment Juan San is a 69 y.o. Male who [facility] from home w/ c/o worsening balance and falls and diagnosis of LIGIA. Orders for PT eval and treat received. Pt presents w/ comorbidities of lumbar spondylosis, HTN, chornic diarrhea, alcoholism in remission, DMII, CPS. At baseline, pt mobilizes supervision w/ RW, and reports + falls in the last 6  months. Upon evaluation, pt presents w/ the following deficits: weakness, impaired balance, decreased endurance, and pain limiting functional mobility. Upon eval, pt requires  min A x 1 for transfers, and min A x 1 for gait. Based on this PT evaluation today, patient's discharge recommendation is for Level II - Moderate Rehab Resource Intensity. During this admission, pt would benefit from continued skilled inpatient PT in the acute care setting in order to address the abovementioned deficits to maximize function and mobility before DC from acute care.   Barriers to Discharge Inaccessible home environment;Decreased caregiver support   Goals   Patient Goals to get stronger for his first granddaughter in July and a wedding in October   STG Expiration Date 02/28/25   Short Term Goal #1 Patient will: Perform all bed mobility tasks w/ supervision to improve pt's independence w/ repositioning for decrease risk of skin breakdown, Perform all transfers w/ supervision consistently from various height surfaces in order to improve I w/ engagement w/ real-world environments/situations, Ambulate at least 75 ft. with roller walker w/ supervision w/o LOB to facilitate return and engagement w/ previous living environment, and Navigate 2 stairs w/ supervision with unilateral handrail to either improve independence w/ entering home and/or so patient can fully access living areas in home   PT Treatment Day 0   Plan   Treatment/Interventions Functional transfer training;LE strengthening/ROM;Elevations;Therapeutic exercise;Endurance training;Patient/family training;Equipment eval/education;Bed mobility;Gait training   PT Frequency 3-5x/wk   Discharge Recommendation   Rehab Resource Intensity Level, PT II (Moderate Resource Intensity)  (vs 3 pending level of A family can provide)   AM-PAC Basic Mobility Inpatient   Turning in Flat Bed Without Bedrails 3   Lying on Back to Sitting on Edge of Flat Bed Without Bedrails 3   Moving Bed to  Chair 3   Standing Up From Chair Using Arms 3   Walk in Room 3   Climb 3-5 Stairs With Railing 2   Basic Mobility Inpatient Raw Score 17   Basic Mobility Standardized Score 39.67   [facility] Highest Level Of Mobility   -[facility] [place] Goal 5: Stand one or more mins   -HL Achieved 6: Walk 10 steps or more   Additional Treatment Session   Start Time 1331   End Time 1341   Treatment Assessment Juan is seated at EOB. After 3 minute seated break, requires min A x 1 for STS from EOB. He then is able to ambulate 10 ft w/ RW w/ min A x 1. Seated OOB in recliner chair   Equipment Use RW   Additional Treatment Day 1   End of Consult   Patient Position at End of Consult Bedside chair;Bed/Chair alarm activated;All needs within reach       Pt would benefit from skilled inpatient PT during this admission in order to facilitate progress towards goals to maximize functional independence    Lea Johnson PT, DPT

## 2025-02-18 NOTE — OCCUPATIONAL THERAPY NOTE
Occupational Therapy Evaluation     Patient Name: Juan San  Today's Date: 2/18/2025  Problem List  Principal Problem:    Acute kidney injury (HCC)  Active Problems:    Lumbar spondylosis    Hypertension    Iron deficiency anemia    Decreased oral intake    Chronic diarrhea    Past Medical History  Past Medical History:   Diagnosis Date    Allergic     Anemia     Anxiety     Arthritis     Cancer (HCC)     Chronic back pain     Depression     Diabetes mellitus (HCC)     Hypertension     Liver disease     Mitral valve prolapse     Peripheral neuropathy     Neuropathy    PONV (postoperative nausea and vomiting)     Thyroid disease      Past Surgical History  Past Surgical History:   Procedure Laterality Date    COLONOSCOPY      INCISION AND DRAINAGE OF WOUND Left 08/12/2022    Procedure: INCISION AND DRAINAGE (I&D) EXTREMITY;  Surgeon: Moustapha Cote DPM;  Location:  MAIN OR;  Service: Podiatry    IR BIOPSY SPINE  1/30/2024    IR BIOPSY SPINE  2/1/2024    IR PICC PLACEMENT SINGLE LUMEN  2/6/2024    JOINT REPLACEMENT Left 03/11/2022    Left TSA    NV ARTHRODESIS POSTERIOR/PSTLAT TQ 1NTRSPC LUMBAR Bilateral 04/11/2023    Procedure: L1-S1 navigated posterior decompression with instrumented fixation fusion;  Surgeon: James Moraes MD;  Location:  MAIN OR;  Service: Neurosurgery    THYROID SURGERY  2021    remove cancer         02/18/25 1320   OT Last Visit   OT Visit Date 02/18/25   Note Type   Note type Evaluation   Pain Assessment   Pain Assessment Tool 0-10   Pain Score 10 - Worst Possible Pain   Pain Location/Orientation Orientation: Lower;Location: Back   Patient's Stated Pain Goal No pain   Restrictions/Precautions   Weight Bearing Precautions Per Order No   Other Precautions Chair Alarm;Bed Alarm;Cognitive;Fall Risk;Pain;Multiple lines   Home Living   Type of Home House   Home Layout One level;Performs ADLs on one level;Able to live on main level with bedroom/bathroom  (1 CECILIO)   Bathroom Shower/Tub  "Walk-in shower   Bathroom Toilet Standard   Bathroom Equipment Grab bars in shower;Shower chair   Home Equipment Walker  (rollator and regular walker)   Prior Function   Level of [place] Independent with functional mobility;Needs assistance with ADLs;Needs assistance with IADLS   Lives With Spouse   Receives Help From (S)  Family  (spouse reports she works long hours during the day; pt is home alone during the day)   IADLs Family/Friend/Other provides transportation;Family/Friend/Other provides medication management;Family/Friend/Other provides meals   Falls in the last 6 months (S)  5 to 10  (recent increase in # of falls)   Vocational Retired   Lifestyle   Autonomy Pt/spouse state PTA he as A with ADL/IADLs, and IND with functional mobility with use of rollator walker.   Reciprocal Relationships spouse   General   Family/Caregiver Present Yes   Subjective   Subjective \"I dont know the date.\"   ADL   Eating Assistance 5  Supervision/Setup   Grooming Assistance 5  Supervision/Setup   UB Bathing Assistance 3  Moderate Assistance   LB Bathing Assistance 3  Moderate Assistance   UB Dressing Assistance 3  Moderate Assistance   LB Dressing Assistance 3  Moderate Assistance   Toileting Assistance  3  Moderate Assistance   Bed Mobility   Additional Comments Pt OOB in recliner chair upon arrival for OT evalaution   Transfers   Sit to Stand 4  Minimal assistance   Additional items Assist x 1;Armrests;Increased time required;Verbal cues   Stand to Sit 4  Minimal assistance   Additional items Assist x 1;Armrests;Increased time required;Verbal cues   Additional Comments VC's for hand placement   Functional Mobility   Functional Mobility 4  Minimal assistance   Additional Comments x1   Additional items Rolling walker  (ambulated in room ~35 ft with seated rest break after 15ft)   Balance   Static Sitting Fair +   Dynamic Sitting Fair   Static Standing Fair -   Dynamic Standing Poor +   Activity Tolerance   Activity " Tolerance Patient tolerated treatment well   Medical Staff Made Aware PT Lea   Nurse Made Aware NATE Gutierrez   RUNORMA Assessment   RUE Assessment WFL   LUE Assessment   LUE Assessment WFL   Hand Function   Gross Motor Coordination Functional   Fine Motor Coordination Functional   Vision-Basic Assessment   Current Vision No visual deficits   Psychosocial   Psychosocial (WDL) WDL   Cognition   Overall Cognitive Status Impaired   Arousal/Participation Alert;Cooperative   Attention Attends with cues to redirect   Orientation Level Oriented to person;Oriented to place;Oriented to time   Memory Decreased short term memory;Decreased recall of recent events;Decreased recall of precautions   Following Commands Follows one step commands with increased time or repetition   Comments Pt willing to participate in OT evaluation. Pt with decreased safety awareness and sligh confusion t/o session   Assessment   Limitation Decreased ADL status;Decreased UE strength;Decreased cognition;Decreased Safe judgement during ADL;Decreased endurance;Decreased high-level ADLs;Decreased self-care trans   Prognosis Fair   Assessment Pt is a 69 y.o. male seen for OT evaluation at [facility], admitted 2/16/2025 w/ Acute kidney injury (HCC). Extensive chart review completed by OT. Comorbidities affecting the pt's functional performance include a significant PMH of: Lumbar spondylosis, anemia, LIGIA, alcoholism, cirrhosis, diabetic peripheral neuropathy, DM, herniated nucleus pulposus of lumbosacral region, hypercholesterolemia, thyroid cancer, chronic pain syndrome, urinary retention, hyponatremia, hypokalemia, bipolar II disorder. Personal factors affecting pt at time of IE include: steps to enter environment, limited home support, difficulty performing ADLS, difficulty performing IADLS , limited insight into deficits, flat affect, health management , and environment. PTA pt was living in a 1 SH w/ 1 CECILIO, was  A w/ ADLs and A w/ IADLS, and IND with functional  mobility with use of rollator, (-) driving, (+) home alone during day while spouse is home. Upon OT IE pt demonstrated min Ax1 for functional transfers, min Ax1 for functional mobility with RW, modA for UB ADLs and modA for LB ADLS 2* the following deficits impacting occupational performance: weakness, decreased strength, decreased balance, decreased tolerance, impaired memory, impaired sequencing, impaired problem solving, decreased safety awareness, and increased pain. Based on OT IE, pt demonstrates the need for continued skilled OT tx focused on eating, grooming, bathing/shower, toilet hygiene, dressing, functional mobility, and clothing management during their stay in the hospital to address the stated deficits and maximize their level of functional independence w/ their ADLS and functional mobility. The patient's raw score on the AM-PAC Daily Activity inpatient short form is 17, standardized score is 37.26, less than 39.4. Patients at this level are likely to benefit from DC to post-acute rehabilitation services. However, please refer to therapist recommendation for discharge planning given other factors that may influence destination. At this time, OT recommendations at time of discharge are DC with Level II - Moderate Rehab Resource Intensity resources.   Goals   Patient Goals To get stronger   Plan   Treatment Interventions ADL retraining;Functional transfer training;UE strengthening/ROM;Endurance training;Cognitive reorientation;Patient/family training;Equipment evaluation/education;Compensatory technique education;Continued evaluation   Goal Expiration Date 02/28/25   OT Treatment Day 0   OT Frequency 2-3x/wk   Discharge Recommendation   Rehab Resource Intensity Level, OT II (Moderate Resource Intensity)   AM-PAC Daily Activity Inpatient   Lower Body Dressing 2   Bathing 2   Toileting 2   Upper Body Dressing 3   Grooming 4   Eating 4   Daily Activity Raw Score 17   Daily Activity Standardized Score (Calc  for Raw Score >=11) 37.26   AM-PAC Applied Cognition Inpatient   Following a Speech/Presentation 2   Understanding Ordinary Conversation 3   Taking Medications 3   Remembering Where Things Are Placed or Put Away 2   Remembering List of 4-5 Errands 2   Taking Care of Complicated Tasks 2   Applied Cognition Raw Score 14   Applied Cognition Standardized Score 32.02   End of Consult   Education Provided Yes   Patient Position at End of Consult Bedside chair;Bed/Chair alarm activated;All needs within reach   Nurse Communication Nurse aware of consult       Pt will complete the following goals within 10 days:     Pt will complete grooming with IND.    Pt will complete UB bathing and dressing with IND.    Pt will complete LB bathing and dressing with Griselda  & DME PRN.    Pt will complete toileting w/ Griselda  w/ G hygiene/thoroughness using DME PRN    Pt will improve functional mobility during ADL/IADL/leisure tasks to Griselda  using DME as needed w/ G balance/safety.    Pt will perform functional transfers with Griselda in order to facilitate completion of  ADL routine.    Pt will increase standing tolerance to 10+ minutes in order to complete ADL routine.    Pt will demonstrate G carryover of pt/caregiver education and training as appropriate w/ Mod I w/o cues w/ good tolerance.    Pt will improve functional activity tolerance to 10+ minutes of sustained functional tasks to increase participation in basic self-care and decrease assistance level.     Pt will identify 3-5 fall risks to ensure safety upon discharge.    Madeleine Marcos), OTR/L

## 2025-02-18 NOTE — ASSESSMENT & PLAN NOTE
69-year-old male with history of lumbar radiculopathy, status post lumbar fusion, discitis, chronic osteomyelitis on suppressive antibiotics, type 2 diabetes, bipolar 2 disorder, iron deficiency anemia who presents with poor oral intake for 2 weeks found to have LIGIA.   On NSAIDs chronically for pain. Also with chronic diarrhea in the setting of suppressive antibiotics.     Labs significant for LIGIA, creatinine 2.56 on admission, currently 1.55. Anemia, hemoglobin 8.3, baseline appears to be 10-11.   EGD/colonoscopy in 2023, but not completed.    Suspect erosive gastritis or peptic ulcer disease in the setting of chronic NSAID use, cannot rule out malignancy given history of iron deficiency anemia.    -Continue Protonix 40 mg daily  -Continue IV fluids for treatment of LIGIA  -Recommend EGD/colonoscopy, tentative plan mid-to-late week as patient is currently being treated for LIGIA  -Avoid NSAIDs  -Encourage diet as tolerated

## 2025-02-18 NOTE — CASE MANAGEMENT
Case Management Discharge Planning Note    Patient name Juan San  Location /-01 MRN [id number]  : 1955 Date 2025       Current Admission Date: 2025  Current Admission Diagnosis:Acute kidney injury (HCC)   Patient Active Problem List    Diagnosis Date Noted Date Diagnosed    Chronic diarrhea 2025     Decreased oral intake 2025     Bipolar II disorder (HCC) 2024     Elevated troponin I level 10/06/2024     Ambulatory dysfunction 10/06/2024     SIRS (systemic inflammatory response syndrome) (HCC) 10/06/2024     Hypokalemia 10/06/2024     Metabolic acidosis, increased anion gap 10/06/2024     Prostatitis 2024     Lower extremity edema 2024     Venous stasis ulcers (HCC) 2024     Acute kidney injury (HCC) 2024     Failure of joint fusion ([facility]) 2024     Lactic acidosis 2024     Type 2 diabetes mellitus with hyperglycemia, without long-term current use of insulin (HCC) 2024     Foraminal stenosis of lumbar region 2024     Discitis of lumbosacral region 2024     Iron deficiency anemia 10/10/2023     Acute on chronic bilateral low back pain with sciatica 10/09/2023     Anxiety and depression 10/09/2023     Hypertension 10/09/2023     Benign prostatic hyperplasia with urinary frequency 2023     Scrotal pain 2023     Lower urinary tract symptoms 2023     Status post lumbar spinal fusion 2023     Hyponatremia 2023     Gallstones 2023     Anemia 2023     Urinary retention 2023     Nausea and vomiting      Medical marijuana use      Chronic bilateral low back pain without sciatica 2023     Lumbar radiculopathy      Chronic pain syndrome 2022     Liver disease 08/10/2022     Hypertensive urgency 2022     Bronchitis, mucopurulent recurrent (HCC) 2022     Cirrhosis, alcoholic (HCC) 2022     Diabetic peripheral neuropathy (HCC) 2022      Herniated nucleus pulposus of lumbosacral region 07/29/2022     Thyroid cancer (HCC) 08/19/2021     Alcoholism in remission (HCC) 07/30/2021     Benzodiazepine dependence (HCC) 07/30/2021     Bilateral adhesive capsulitis of shoulders 07/30/2021     DM (diabetes mellitus) (HCC) 07/30/2021     Hypercholesterolemia 07/30/2021     Lumbar spondylosis 07/30/2021       LOS (days): 2  Geometric Mean LOS (GMLOS) (days): 3  Days to GMLOS:1     OBJECTIVE:  Risk of Unplanned Readmission Score: 61.84         Current admission status: Inpatient   Preferred Pharmacy:   [facility] [address] [place] [address] [address]  [address] [facility] [address]  Phone: [phone] Fax: [phone]    Primary Care Provider: RUIZ Hanks    Primary Insurance: [employer]  Secondary Insurance: [facility]    DISCHARGE DETAILS:                                          Other Referral/Resources/Interventions Provided:  Interventions: Short Term Rehab  Referral Comments: Met with pt and spouse to review PTOT's rec for STR. Pt and spouse agreeable to plan. Referrals in [place]. Pt is not on isos, no behaviors, BM 02/18. [facility] are choice. Cm to follow. Pt requires auth.         Treatment Team Recommendation: Short Term Rehab  Discharge Destination Plan:: Short Term Rehab

## 2025-02-18 NOTE — PLAN OF CARE
Problem: Potential for Falls  Goal: Patient will remain free of falls  Description: INTERVENTIONS:  - Educate patient/family on patient safety including physical limitations  - Instruct patient to call for assistance with activity   - Consult OT/PT to assist with strengthening/mobility   - Keep Call bell within reach  - Keep bed low and locked with side rails adjusted as appropriate  - Keep care items and personal belongings within reach  - Initiate and maintain comfort rounds  - Make Fall Risk Sign visible to staff  - Offer Toileting every 2 Hours, in advance of need  - Initiate/Maintain 2alarm  - Obtain necessary fall risk management equipment: 2  - Apply yellow socks and bracelet for high fall risk patients  - Consider moving patient to room near nurses station  Outcome: Progressing     Problem: PAIN - ADULT  Goal: Verbalizes/displays adequate comfort level or baseline comfort level  Description: Interventions:  - Encourage patient to monitor pain and request assistance  - Assess pain using appropriate pain scale  - Administer analgesics based on type and severity of pain and evaluate response  - Implement non-pharmacological measures as appropriate and evaluate response  - Consider cultural and social influences on pain and pain management  - Notify physician/advanced practitioner if interventions unsuccessful or patient reports new pain  Outcome: Progressing     Problem: INFECTION - ADULT  Goal: Absence or prevention of progression during hospitalization  Description: INTERVENTIONS:  - Assess and monitor for signs and symptoms of infection  - Monitor lab/diagnostic results  - Monitor all insertion sites, i.e. indwelling lines, tubes, and drains  - Monitor endotracheal if appropriate and nasal secretions for changes in amount and color  - Moran appropriate cooling/warming therapies per order  - Administer medications as ordered  - Instruct and encourage patient and family to use good hand hygiene  technique  - Identify and instruct in appropriate isolation precautions for identified infection/condition  Outcome: Progressing  Goal: Absence of fever/infection during neutropenic period  Description: INTERVENTIONS:  - Monitor WBC    Outcome: Progressing     Problem: SAFETY ADULT  Goal: Patient will remain free of falls  Description: INTERVENTIONS:  - Educate patient/family on patient safety including physical limitations  - Instruct patient to call for assistance with activity   - Consult OT/PT to assist with strengthening/mobility   - Keep Call bell within reach  - Keep bed low and locked with side rails adjusted as appropriate  - Keep care items and personal belongings within reach  - Initiate and maintain comfort rounds  - Make Fall Risk Sign visible to staff  - Offer Toileting every 2 Hours, in advance of need  - Initiate/Maintain 2alarm  - Obtain necessary fall risk management equipment: 2  - Apply yellow socks and bracelet for high fall risk patients  - Consider moving patient to room near nurses station  Outcome: Progressing  Goal: Maintain or return to baseline ADL function  Description: INTERVENTIONS:  -  Assess patient's ability to carry out ADLs; assess patient's baseline for ADL function and identify physical deficits which impact ability to perform ADLs (bathing, care of mouth/teeth, toileting, grooming, dressing, etc.)  - Assess/evaluate cause of self-care deficits   - Assess range of motion  - Assess patient's mobility; develop plan if impaired  - Assess patient's need for assistive devices and provide as appropriate  - Encourage maximum independence but intervene and supervise when necessary  - Involve family in performance of ADLs  - Assess for home care needs following discharge   - Consider OT consult to assist with ADL evaluation and planning for discharge  - Provide patient education as appropriate  Outcome: Progressing  Goal: Maintains/Returns to pre admission functional level  Description:  INTERVENTIONS:  - Perform AM-PAC 6 Click Basic Mobility/ Daily Activity assessment daily.  - Set and communicate daily mobility goal to care team and patient/family/caregiver.   - Collaborate with rehabilitation services on mobility goals if consulted  - Perform Range of Motion 2 times a day.  - Reposition patient every 2 hours.  - Dangle patient 2 times a day  - Stand patient 2 times a day  - Ambulate patient 2 times a day  - Out of bed to chair 2 times a day   - Out of bed for meals 2 times a day  - Out of bed for toileting  - Record patient progress and toleration of activity level   Outcome: Progressing     Problem: DISCHARGE PLANNING  Goal: Discharge to home or other facility with appropriate resources  Description: INTERVENTIONS:  - Identify barriers to discharge w/patient and caregiver  - Arrange for needed discharge resources and transportation as appropriate  - Identify discharge learning needs (meds, wound care, etc.)  - Arrange for interpretive services to assist at discharge as needed  - Refer to Case Management Department for coordinating discharge planning if the patient needs post-hospital services based on physician/advanced practitioner order or complex needs related to functional status, cognitive ability, or social support system  Outcome: Progressing     Problem: Knowledge Deficit  Goal: Patient/family/caregiver demonstrates understanding of disease process, treatment plan, medications, and discharge instructions  Description: Complete learning assessment and assess knowledge base.  Interventions:  - Provide teaching at level of understanding  - Provide teaching via preferred learning methods  Outcome: Progressing     Problem: Prexisting or High Potential for Compromised Skin Integrity  Goal: Skin integrity is maintained or improved  Description: INTERVENTIONS:  - Identify patients at risk for skin breakdown  - Assess and monitor skin integrity  - Assess and monitor nutrition and hydration  status  - Monitor labs   - Assess for incontinence   - Turn and reposition patient  - Assist with mobility/ambulation  - Relieve pressure over bony prominences  - Avoid friction and shearing  - Provide appropriate hygiene as needed including keeping skin clean and dry  - Evaluate need for skin moisturizer/barrier cream  - Collaborate with interdisciplinary team   - Patient/family teaching  - Consider wound care consult   Outcome: Progressing

## 2025-02-18 NOTE — ASSESSMENT & PLAN NOTE
Anemia, hemoglobin 8.3, baseline appears to be 10-11. History of iron deficiency in the past.  He has no current overt bleeding. Was recommended to undergo EGD/colonoscopy in 2023, but not completed.   Iron panel 2/17; iron 104, iron sat 86, TIBC 120.4, ferritin 137.  Differential includes erosive esophagitis/gastritis/duodenitis, peptic ulcer, malabsorption, AVM, large polyp, malignancy.    -Monitor hemoglobin, transfuse less than 7.  -Plan for inpatient EGD/colonoscopy as discussed above

## 2025-02-18 NOTE — ASSESSMENT & PLAN NOTE
Pt has non tender abdomen   Pt is using medical marijuana which in past was thought to be contributing to GI sx  Continue  IV PPI qd  Diet as tolerated   Pt has NSAID usage in setting of his back issues which I have avoid discontinuing and describes epigastric pain/burning  Plan for EGD/colonoscopy tomorrow

## 2025-02-18 NOTE — PLAN OF CARE
Problem: PHYSICAL THERAPY ADULT  Goal: Performs mobility at highest level of function for planned discharge setting.  See evaluation for individualized goals.  Description: Treatment/Interventions: Functional transfer training, LE strengthening/ROM, Elevations, Therapeutic exercise, Endurance training, Patient/family training, Equipment eval/education, Bed mobility, Gait training          See flowsheet documentation for full assessment, interventions and recommendations.  2/18/2025 1556 by Lea Johnson, PT  Note:    Problem List: Decreased strength, Decreased endurance, Impaired balance, Decreased mobility, Pain  Assessment: Juan San is a 69 y.o. Male who presents to [facility] on [facility]/16[facility]2025 from home w/ c/o worsening balance and falls and diagnosis of LIGIA. Orders for PT eval and treat received. Pt presents w/ comorbidities of lumbar spondylosis, HTN, chornic diarrhea, alcoholism in remission, DMII, CPS. At baseline, pt mobilizes supervision w/ RW, and reports + falls in the last 6 months. Upon evaluation, pt presents w/ the following deficits: weakness, impaired balance, decreased endurance, and pain limiting functional mobility. Upon eval, pt requires  min A x 1 for transfers, and min A x 1 for gait. Based on this PT evaluation today, patient's discharge recommendation is for Level II - Moderate Rehab Resource Intensity. During this admission, pt would benefit from continued skilled inpatient PT in the acute care setting in order to address the abovementioned deficits to maximize function and mobility before DC from acute care.  Barriers to Discharge: Inaccessible home environment, Decreased caregiver support     Rehab Resource Intensity Level, PT: II (Moderate Resource Intensity) (vs 3 pending level of A family can provide)    See flowsheet documentation for full assessment.

## 2025-02-19 ENCOUNTER — ANESTHESIA EVENT (INPATIENT)
Dept: GASTROENTEROLOGY | Facility: HOSPITAL | Age: 70
DRG: 683 | End: 2025-02-19
Payer: COMMERCIAL

## 2025-02-19 ENCOUNTER — ANESTHESIA (INPATIENT)
Dept: GASTROENTEROLOGY | Facility: HOSPITAL | Age: 70
DRG: 683 | End: 2025-02-19
Payer: COMMERCIAL

## 2025-02-19 ENCOUNTER — APPOINTMENT (INPATIENT)
Dept: GASTROENTEROLOGY | Facility: HOSPITAL | Age: 70
DRG: 683 | End: 2025-02-19
Payer: COMMERCIAL

## 2025-02-19 LAB
ALBUMIN SERPL BCG-MCNC: 3 G/DL (ref 3.5–5)
ALP SERPL-CCNC: 50 U/L (ref 34–104)
ALT SERPL W P-5'-P-CCNC: 33 U/L (ref 7–52)
ANION GAP SERPL CALCULATED.3IONS-SCNC: 5 MMOL/L (ref 4–13)
AST SERPL W P-5'-P-CCNC: 29 U/L (ref 13–39)
BASOPHILS # BLD AUTO: 0.04 THOUSANDS/ΜL (ref 0–0.1)
BASOPHILS NFR BLD AUTO: 1 % (ref 0–1)
BILIRUB SERPL-MCNC: 0.6 MG/DL (ref 0.2–1)
BUN SERPL-MCNC: 30 MG/DL (ref 5–25)
CALCIUM ALBUM COR SERPL-MCNC: 8.6 MG/DL (ref 8.3–10.1)
CALCIUM SERPL-MCNC: 7.8 MG/DL (ref 8.4–10.2)
CHLORIDE SERPL-SCNC: 114 MMOL/L (ref 96–108)
CO2 SERPL-SCNC: 23 MMOL/L (ref 21–32)
CREAT SERPL-MCNC: 1.12 MG/DL (ref 0.6–1.3)
EOSINOPHIL # BLD AUTO: 0.05 THOUSAND/ΜL (ref 0–0.61)
EOSINOPHIL NFR BLD AUTO: 1 % (ref 0–6)
ERYTHROCYTE [DISTWIDTH] IN BLOOD BY AUTOMATED COUNT: 18.6 % (ref 11.6–15.1)
GFR SERPL CREATININE-BSD FRML MDRD: 67 ML/MIN/1.73SQ M
GLUCOSE SERPL-MCNC: 182 MG/DL (ref 65–140)
GLUCOSE SERPL-MCNC: 63 MG/DL (ref 65–140)
GLUCOSE SERPL-MCNC: 86 MG/DL (ref 65–140)
HCT VFR BLD AUTO: 23.2 % (ref 36.5–49.3)
HGB BLD-MCNC: 7.9 G/DL (ref 12–17)
IMM GRANULOCYTES # BLD AUTO: 0.04 THOUSAND/UL (ref 0–0.2)
IMM GRANULOCYTES NFR BLD AUTO: 1 % (ref 0–2)
LYMPHOCYTES # BLD AUTO: 3.29 THOUSANDS/ΜL (ref 0.6–4.47)
LYMPHOCYTES NFR BLD AUTO: 45 % (ref 14–44)
MAGNESIUM SERPL-MCNC: 1.5 MG/DL (ref 1.9–2.7)
MCH RBC QN AUTO: 25.6 PG (ref 26.8–34.3)
MCHC RBC AUTO-ENTMCNC: 34.1 G/DL (ref 31.4–37.4)
MCV RBC AUTO: 75 FL (ref 82–98)
MONOCYTES # BLD AUTO: 0.56 THOUSAND/ΜL (ref 0.17–1.22)
MONOCYTES NFR BLD AUTO: 8 % (ref 4–12)
NEUTROPHILS # BLD AUTO: 3.32 THOUSANDS/ΜL (ref 1.85–7.62)
NEUTS SEG NFR BLD AUTO: 44 % (ref 43–75)
NRBC BLD AUTO-RTO: 2 /100 WBCS
PLATELET # BLD AUTO: 248 THOUSANDS/UL (ref 149–390)
PMV BLD AUTO: 10.2 FL (ref 8.9–12.7)
POTASSIUM SERPL-SCNC: 4.1 MMOL/L (ref 3.5–5.3)
PROT SERPL-MCNC: 4.8 G/DL (ref 6.4–8.4)
RBC # BLD AUTO: 3.08 MILLION/UL (ref 3.88–5.62)
SODIUM SERPL-SCNC: 142 MMOL/L (ref 135–147)
WBC # BLD AUTO: 7.3 THOUSAND/UL (ref 4.31–10.16)

## 2025-02-19 PROCEDURE — 88342 IMHCHEM/IMCYTCHM 1ST ANTB: CPT | Performed by: PATHOLOGY

## 2025-02-19 PROCEDURE — 88341 IMHCHEM/IMCYTCHM EA ADD ANTB: CPT | Performed by: PATHOLOGY

## 2025-02-19 PROCEDURE — 80053 COMPREHEN METABOLIC PANEL: CPT | Performed by: PHYSICIAN ASSISTANT

## 2025-02-19 PROCEDURE — 99232 SBSQ HOSP IP/OBS MODERATE 35: CPT | Performed by: PHYSICIAN ASSISTANT

## 2025-02-19 PROCEDURE — 0DJD8ZZ INSPECTION OF LOWER INTESTINAL TRACT, VIA NATURAL OR ARTIFICIAL OPENING ENDOSCOPIC: ICD-10-PCS | Performed by: INTERNAL MEDICINE

## 2025-02-19 PROCEDURE — 85025 COMPLETE CBC W/AUTO DIFF WBC: CPT | Performed by: PHYSICIAN ASSISTANT

## 2025-02-19 PROCEDURE — 0DB98ZX EXCISION OF DUODENUM, VIA NATURAL OR ARTIFICIAL OPENING ENDOSCOPIC, DIAGNOSTIC: ICD-10-PCS | Performed by: INTERNAL MEDICINE

## 2025-02-19 PROCEDURE — 0DB28ZX EXCISION OF MIDDLE ESOPHAGUS, VIA NATURAL OR ARTIFICIAL OPENING ENDOSCOPIC, DIAGNOSTIC: ICD-10-PCS | Performed by: INTERNAL MEDICINE

## 2025-02-19 PROCEDURE — 82948 REAGENT STRIP/BLOOD GLUCOSE: CPT

## 2025-02-19 PROCEDURE — 43239 EGD BIOPSY SINGLE/MULTIPLE: CPT | Performed by: INTERNAL MEDICINE

## 2025-02-19 PROCEDURE — 88305 TISSUE EXAM BY PATHOLOGIST: CPT | Performed by: PATHOLOGY

## 2025-02-19 PROCEDURE — 45378 DIAGNOSTIC COLONOSCOPY: CPT | Performed by: INTERNAL MEDICINE

## 2025-02-19 PROCEDURE — 83735 ASSAY OF MAGNESIUM: CPT | Performed by: PHYSICIAN ASSISTANT

## 2025-02-19 PROCEDURE — 0DB68ZX EXCISION OF STOMACH, VIA NATURAL OR ARTIFICIAL OPENING ENDOSCOPIC, DIAGNOSTIC: ICD-10-PCS | Performed by: INTERNAL MEDICINE

## 2025-02-19 PROCEDURE — 88312 SPECIAL STAINS GROUP 1: CPT | Performed by: PATHOLOGY

## 2025-02-19 RX ORDER — PANTOPRAZOLE SODIUM 40 MG/10ML
40 INJECTION, POWDER, LYOPHILIZED, FOR SOLUTION INTRAVENOUS EVERY 12 HOURS SCHEDULED
Status: DISCONTINUED | OUTPATIENT
Start: 2025-02-19 | End: 2025-02-21 | Stop reason: HOSPADM

## 2025-02-19 RX ORDER — MAGNESIUM SULFATE HEPTAHYDRATE 40 MG/ML
2 INJECTION, SOLUTION INTRAVENOUS ONCE
Status: COMPLETED | OUTPATIENT
Start: 2025-02-19 | End: 2025-02-19

## 2025-02-19 RX ORDER — PROPOFOL 10 MG/ML
INJECTION, EMULSION INTRAVENOUS AS NEEDED
Status: DISCONTINUED | OUTPATIENT
Start: 2025-02-19 | End: 2025-02-19

## 2025-02-19 RX ORDER — SODIUM CHLORIDE 9 MG/ML
INJECTION, SOLUTION INTRAVENOUS CONTINUOUS PRN
Status: DISCONTINUED | OUTPATIENT
Start: 2025-02-19 | End: 2025-02-19

## 2025-02-19 RX ADMIN — INSULIN LISPRO 1 UNITS: 100 INJECTION, SOLUTION INTRAVENOUS; SUBCUTANEOUS at 18:24

## 2025-02-19 RX ADMIN — NIFEDIPINE 90 MG: 30 TABLET, FILM COATED, EXTENDED RELEASE ORAL at 09:21

## 2025-02-19 RX ADMIN — SODIUM CHLORIDE, SODIUM LACTATE, POTASSIUM CHLORIDE, AND CALCIUM CHLORIDE 100 ML/HR: .6; .31; .03; .02 INJECTION, SOLUTION INTRAVENOUS at 02:58

## 2025-02-19 RX ADMIN — FINASTERIDE 5 MG: 5 TABLET, FILM COATED ORAL at 09:18

## 2025-02-19 RX ADMIN — PROPOFOL 20 MG: 10 INJECTION, EMULSION INTRAVENOUS at 12:05

## 2025-02-19 RX ADMIN — STANDARDIZED SENNA CONCENTRATE 8.6 MG: 8.6 TABLET ORAL at 09:18

## 2025-02-19 RX ADMIN — ACETAMINOPHEN 975 MG: 325 TABLET, FILM COATED ORAL at 14:29

## 2025-02-19 RX ADMIN — NIFEDIPINE 30 MG: 30 TABLET, FILM COATED, EXTENDED RELEASE ORAL at 09:21

## 2025-02-19 RX ADMIN — DOXYCYCLINE 100 MG: 100 CAPSULE ORAL at 09:18

## 2025-02-19 RX ADMIN — ENOXAPARIN SODIUM 30 MG: 100 INJECTION SUBCUTANEOUS at 09:18

## 2025-02-19 RX ADMIN — GABAPENTIN 300 MG: 300 CAPSULE ORAL at 18:21

## 2025-02-19 RX ADMIN — PRAVASTATIN SODIUM 20 MG: 20 TABLET ORAL at 18:21

## 2025-02-19 RX ADMIN — ACETAMINOPHEN 975 MG: 325 TABLET, FILM COATED ORAL at 21:03

## 2025-02-19 RX ADMIN — HYDROXYZINE HYDROCHLORIDE 50 MG: 25 TABLET, FILM COATED ORAL at 18:21

## 2025-02-19 RX ADMIN — SODIUM CHLORIDE: 0.9 INJECTION, SOLUTION INTRAVENOUS at 11:24

## 2025-02-19 RX ADMIN — METOPROLOL SUCCINATE 50 MG: 50 TABLET, EXTENDED RELEASE ORAL at 21:03

## 2025-02-19 RX ADMIN — HYDROXYZINE HYDROCHLORIDE 50 MG: 25 TABLET, FILM COATED ORAL at 09:18

## 2025-02-19 RX ADMIN — OXYCODONE HYDROCHLORIDE 5 MG: 5 TABLET ORAL at 22:24

## 2025-02-19 RX ADMIN — DOXYCYCLINE 100 MG: 100 CAPSULE ORAL at 18:21

## 2025-02-19 RX ADMIN — GABAPENTIN 300 MG: 300 CAPSULE ORAL at 21:02

## 2025-02-19 RX ADMIN — MIRTAZAPINE 45 MG: 15 TABLET, FILM COATED ORAL at 21:03

## 2025-02-19 RX ADMIN — STANDARDIZED SENNA CONCENTRATE 8.6 MG: 8.6 TABLET ORAL at 18:21

## 2025-02-19 RX ADMIN — MAGNESIUM SULFATE HEPTAHYDRATE 2 G: 40 INJECTION, SOLUTION INTRAVENOUS at 09:19

## 2025-02-19 RX ADMIN — CLONAZEPAM 1 MG: 1 TABLET ORAL at 18:21

## 2025-02-19 RX ADMIN — METOPROLOL SUCCINATE 50 MG: 50 TABLET, EXTENDED RELEASE ORAL at 09:18

## 2025-02-19 RX ADMIN — MAGNESIUM SULFATE HEPTAHYDRATE 2 G: 40 INJECTION, SOLUTION INTRAVENOUS at 14:28

## 2025-02-19 RX ADMIN — PROPOFOL 50 MG: 10 INJECTION, EMULSION INTRAVENOUS at 11:58

## 2025-02-19 RX ADMIN — SODIUM CHLORIDE, SODIUM LACTATE, POTASSIUM CHLORIDE, AND CALCIUM CHLORIDE 100 ML/HR: .6; .31; .03; .02 INJECTION, SOLUTION INTRAVENOUS at 18:24

## 2025-02-19 RX ADMIN — PANTOPRAZOLE SODIUM 40 MG: 40 INJECTION, POWDER, FOR SOLUTION INTRAVENOUS at 09:19

## 2025-02-19 RX ADMIN — PROPOFOL 20 MG: 10 INJECTION, EMULSION INTRAVENOUS at 12:12

## 2025-02-19 RX ADMIN — ESCITALOPRAM OXALATE 20 MG: 20 TABLET ORAL at 09:18

## 2025-02-19 RX ADMIN — PANTOPRAZOLE SODIUM 40 MG: 40 INJECTION, POWDER, LYOPHILIZED, FOR SOLUTION INTRAVENOUS at 21:02

## 2025-02-19 RX ADMIN — ACETAMINOPHEN 975 MG: 325 TABLET, FILM COATED ORAL at 06:07

## 2025-02-19 RX ADMIN — PROPOFOL 90 MG: 10 INJECTION, EMULSION INTRAVENOUS at 11:53

## 2025-02-19 RX ADMIN — OXYCODONE HYDROCHLORIDE 5 MG: 5 TABLET ORAL at 16:28

## 2025-02-19 RX ADMIN — PROPOFOL 20 MG: 10 INJECTION, EMULSION INTRAVENOUS at 12:09

## 2025-02-19 RX ADMIN — FOLIC ACID 2000 MCG: 1 TABLET ORAL at 09:18

## 2025-02-19 RX ADMIN — GABAPENTIN 300 MG: 300 CAPSULE ORAL at 09:18

## 2025-02-19 RX ADMIN — OXYCODONE HYDROCHLORIDE 5 MG: 5 TABLET ORAL at 06:07

## 2025-02-19 RX ADMIN — CLONAZEPAM 1 MG: 1 TABLET ORAL at 09:18

## 2025-02-19 NOTE — CASE MANAGEMENT
Case Management Discharge Planning Note    Patient name Juan San  Location /-01 MRN [id number]  : 1955 Date 2025       Current Admission Date: 2025  Current Admission Diagnosis:Acute kidney injury (HCC)   Patient Active Problem List    Diagnosis Date Noted Date Diagnosed    Chronic diarrhea 2025     Decreased oral intake 2025     Bipolar II disorder (HCC) 2024     Elevated troponin I level 10/06/2024     Ambulatory dysfunction 10/06/2024     SIRS (systemic inflammatory response syndrome) (HCC) 10/06/2024     Hypokalemia 10/06/2024     Metabolic acidosis, increased anion gap 10/06/2024     Prostatitis 2024     Lower extremity edema 2024     Venous stasis ulcers (HCC) 2024     Acute kidney injury (HCC) 2024     Failure of joint fusion (HCC) 2024     Lactic acidosis 2024     Type 2 diabetes mellitus with hyperglycemia, without long-term current use of insulin (HCC) 2024     Foraminal stenosis of lumbar region 2024     Discitis of lumbosacral region 2024     Iron deficiency anemia 10/10/2023     Acute on chronic bilateral low back pain with sciatica 10/09/2023     Anxiety and depression 10/09/2023     Hypertension 10/09/2023     Benign prostatic hyperplasia with urinary frequency 2023     Scrotal pain 2023     Lower urinary tract symptoms 2023     Status post lumbar spinal fusion 2023     Hyponatremia 2023     Gallstones 2023     Anemia 2023     Urinary retention 2023     Nausea and vomiting      Medical marijuana use      Chronic bilateral low back pain without sciatica 2023     Lumbar radiculopathy      Chronic pain syndrome 2022     Liver disease 08/10/2022     Hypertensive urgency 2022     Bronchitis, mucopurulent recurrent (HCC) 2022     Cirrhosis, alcoholic (HCC) 2022     Diabetic peripheral neuropathy (HCC) 2022      Herniated nucleus pulposus of lumbosacral region 07/29/2022     Thyroid cancer (HCC) 08/19/2021     Alcoholism in remission (HCC) 07/30/2021     Benzodiazepine dependence (HCC) 07/30/2021     Bilateral adhesive capsulitis of shoulders 07/30/2021     DM (diabetes mellitus) (HCC) 07/30/2021     Hypercholesterolemia 07/30/2021     Lumbar spondylosis 07/30/2021       LOS (days): 3  Geometric Mean LOS (GMLOS) (days): 3  Days to GMLOS:0.3     OBJECTIVE:  Risk of Unplanned Readmission Score: 60.26         Current admission status: Inpatient   Preferred Pharmacy:   [facility] [address] [place] [address] [address] [address]  Phone: [phone] Fax: [phone]    Primary Care Provider: RUIZ Hanks    Primary Insurance: [employer]  Secondary Insurance: [facility] [place]    DISCHARGE DETAILS:                                          Other Referral/Resources/Interventions Provided:  Interventions: Short Term Rehab  Referral Comments: [facility] reserved in [place], per pt preference. [facility] informed CM that pt could not use marijuana while at facility, as pt has a medical marijuana card. Pt expressed that he is agreeable to not using medical marijuana in any form while at [facility]. Tasked CM DC support for auth.         Treatment Team Recommendation: Short Term Rehab  Discharge Destination Plan:: Short Term Rehab

## 2025-02-19 NOTE — ASSESSMENT & PLAN NOTE
Presented to the emergency department due to concern for dehydration, poor oral intake  Baseline creatinine 0.6-1.0   Presented with creatinine of 2.56 suspect in setting of hypovolemia, NSAID use  Creatinine improving with IV hydration, down to 1.12  Trend BMP

## 2025-02-19 NOTE — PROGRESS NOTES
"Per Az, , patient okay to drink bowel prep up until 4 hrs prior to colonoscopy. Patient only has drank half at this time. RN encouraged pt to keep drinking with little success. Pt educated on the importance of completing bowel prep so that colonoscopy can be completed. Plan of care ongoing.    0420: Pt drank 3/4 of bowel prep and stated \"I'm not drinking no more.\"  "

## 2025-02-19 NOTE — QUICK NOTE
Brief EGD report    Impression:    Normal duodenum.  Biopsies taken for celiac disease  Mild/moderate antral gastritis.  Biopsies taken for H. Pylori  Multiple superficial ulcers in the mid esophagus.  Biopsies taken  RECOMMENDATION:  Will call with biopsy results in 1 to 2 weeks  PPI twice a day  Colonoscopy now       Rodney Bryant MD

## 2025-02-19 NOTE — ANESTHESIA POSTPROCEDURE EVALUATION
Post-Op Assessment Note    Last Filed PACU Vitals:  Vitals Value Taken Time   Temp     Pulse 75 02/19/25 1308   /74 02/19/25 1226   Resp 12 02/19/25 1226   SpO2 97 % 02/19/25 1308   Vitals shown include unfiled device data.    Modified Dang:     Vitals Value Taken Time   Activity 2 02/19/25 1240   Respiration 2 02/19/25 1240   Circulation 2 02/19/25 1240   Consciousness 2 02/19/25 1240   Oxygen Saturation 2 02/19/25 1240     Modified Dang Score: 10

## 2025-02-19 NOTE — PLAN OF CARE
Problem: Potential for Falls  Goal: Patient will remain free of falls  Description: INTERVENTIONS:  - Educate patient/family on patient safety including physical limitations  - Instruct patient to call for assistance with activity   - Consult OT/PT to assist with strengthening/mobility   - Keep Call bell within reach  - Keep bed low and locked with side rails adjusted as appropriate  - Keep care items and personal belongings within reach  - Initiate and maintain comfort rounds  - Make Fall Risk Sign visible to staff  - Offer Toileting every 2 Hours, in advance of need  - Initiate/Maintain 2alarm  - Obtain necessary fall risk management equipment: 2  - Apply yellow socks and bracelet for high fall risk patients  - Consider moving patient to room near nurses station  Outcome: Progressing     Problem: PAIN - ADULT  Goal: Verbalizes/displays adequate comfort level or baseline comfort level  Description: Interventions:  - Encourage patient to monitor pain and request assistance  - Assess pain using appropriate pain scale  - Administer analgesics based on type and severity of pain and evaluate response  - Implement non-pharmacological measures as appropriate and evaluate response  - Consider cultural and social influences on pain and pain management  - Notify physician/advanced practitioner if interventions unsuccessful or patient reports new pain  Outcome: Progressing     Problem: INFECTION - ADULT  Goal: Absence or prevention of progression during hospitalization  Description: INTERVENTIONS:  - Assess and monitor for signs and symptoms of infection  - Monitor lab/diagnostic results  - Monitor all insertion sites, i.e. indwelling lines, tubes, and drains  - Monitor endotracheal if appropriate and nasal secretions for changes in amount and color  - [place] appropriate cooling/warming therapies per order  - Administer medications as ordered  - Instruct and encourage patient and family to use good hand hygiene  technique  - Identify and instruct in appropriate isolation precautions for identified infection/condition  Outcome: Progressing  Goal: Absence of fever/infection during neutropenic period  Description: INTERVENTIONS:  - Monitor WBC    Outcome: Progressing     Problem: SAFETY ADULT  Goal: Patient will remain free of falls  Description: INTERVENTIONS:  - Educate patient/family on patient safety including physical limitations  - Instruct patient to call for assistance with activity   - Consult OT/PT to assist with strengthening/mobility   - Keep Call bell within reach  - Keep bed low and locked with side rails adjusted as appropriate  - Keep care items and personal belongings within reach  - Initiate and maintain comfort rounds  - Make Fall Risk Sign visible to staff  - Offer Toileting every 2 Hours, in advance of need  - Initiate/Maintain 2alarm  - Obtain necessary fall risk management equipment: 2  - Apply yellow socks and bracelet for high fall risk patients  - Consider moving patient to room near nurses station  Outcome: Progressing  Goal: Maintain or return to baseline ADL function  Description: INTERVENTIONS:  -  Assess patient's ability to carry out ADLs; assess patient's baseline for ADL function and identify physical deficits which impact ability to perform ADLs (bathing, care of mouth/teeth, toileting, grooming, dressing, etc.)  - Assess/evaluate cause of self-care deficits   - Assess range of motion  - Assess patient's mobility; develop plan if impaired  - Assess patient's need for assistive devices and provide as appropriate  - Encourage maximum independence but intervene and supervise when necessary  - Involve family in performance of ADLs  - Assess for home care needs following discharge   - Consider OT consult to assist with ADL evaluation and planning for discharge  - Provide patient education as appropriate  Outcome: Progressing  Goal: Maintains/Returns to pre admission functional level  Description:  INTERVENTIONS:  - Perform AM-PAC 6 Click Basic Mobility/ Daily Activity assessment daily.  - Set and communicate daily mobility goal to care team and patient/family/caregiver.   - Collaborate with rehabilitation services on mobility goals if consulted  - Perform Range of Motion 2 times a day.  - Reposition patient every 2 hours.  - Dangle patient 2 times a day  - Stand patient 2 times a day  - Ambulate patient 2 times a day  - Out of bed to chair 2 times a day   - Out of bed for meals 2 times a day  - Out of bed for toileting  - Record patient progress and toleration of activity level   Outcome: Progressing     Problem: DISCHARGE PLANNING  Goal: Discharge to home or other facility with appropriate resources  Description: INTERVENTIONS:  - Identify barriers to discharge w/patient and caregiver  - Arrange for needed discharge resources and transportation as appropriate  - Identify discharge learning needs (meds, wound care, etc.)  - Arrange for interpretive services to assist at discharge as needed  - Refer to Case Management Department for coordinating discharge planning if the patient needs post-hospital services based on physician/advanced practitioner order or complex needs related to functional status, cognitive ability, or social support system  Outcome: Progressing     Problem: Knowledge Deficit  Goal: Patient/family/caregiver demonstrates understanding of disease process, treatment plan, medications, and discharge instructions  Description: Complete learning assessment and assess knowledge base.  Interventions:  - Provide teaching at level of understanding  - Provide teaching via preferred learning methods  Outcome: Progressing     Problem: Prexisting or High Potential for Compromised Skin Integrity  Goal: Skin integrity is maintained or improved  Description: INTERVENTIONS:  - Identify patients at risk for skin breakdown  - Assess and monitor skin integrity  - Assess and monitor nutrition and hydration  status  - Monitor labs   - Assess for incontinence   - Turn and reposition patient  - Assist with mobility/ambulation  - Relieve pressure over bony prominences  - Avoid friction and shearing  - Provide appropriate hygiene as needed including keeping skin clean and dry  - Evaluate need for skin moisturizer/barrier cream  - Collaborate with interdisciplinary team   - Patient/family teaching  - Consider wound care consult   Outcome: Progressing

## 2025-02-19 NOTE — ANESTHESIA POSTPROCEDURE EVALUATION
Post-Op Assessment Note    CV Status:  Stable  Pain Score: 0    Pain management: adequate       Mental Status:  Sleepy   Hydration Status:  Stable   PONV Controlled:  None   Airway Patency:  Patent     Post Op Vitals Reviewed: Yes    No anethesia notable event occurred.    Staff: Anesthesiologist, CRNA           Last Filed PACU Vitals:  Vitals Value Taken Time   Temp     Pulse 60    /55    Resp 14    SpO2 100

## 2025-02-19 NOTE — CASE MANAGEMENT
Case Management Discharge Planning Note    Patient name Juan San  Location /-01 MRN [id number]  : 1955 Date 2025       Current Admission Date: 2025  Current Admission Diagnosis:Acute kidney injury (HCC)   Patient Active Problem List    Diagnosis Date Noted Date Diagnosed    PONV (postoperative nausea and vomiting)      Chronic diarrhea 2025     Decreased oral intake 2025     Bipolar II disorder (HCC) 2024     Elevated troponin I level 10/06/2024     Ambulatory dysfunction 10/06/2024     SIRS (systemic inflammatory response syndrome) (HCC) 10/06/2024     Hypokalemia 10/06/2024     Metabolic acidosis, increased anion gap 10/06/2024     Prostatitis 2024     Lower extremity edema 2024     Venous stasis ulcers ([facility]) 2024     Acute kidney injury (HCC) 2024     Failure of joint fusion ([facility]) 2024     Lactic acidosis 2024     Type 2 diabetes mellitus with hyperglycemia, without long-term current use of insulin ([facility]) 2024     Foraminal stenosis of lumbar region 2024     Discitis of lumbosacral region 2024     Iron deficiency anemia 10/10/2023     Acute on chronic bilateral low back pain with sciatica 10/09/2023     Anxiety and depression 10/09/2023     Hypertension 10/09/2023     Benign prostatic hyperplasia with urinary frequency 2023     Scrotal pain 2023     Lower urinary tract symptoms 2023     Status post lumbar spinal fusion 2023     Hyponatremia 2023     Gallstones 2023     Anemia 2023     Urinary retention 2023     Nausea and vomiting      Medical marijuana use      Chronic bilateral low back pain without sciatica 2023     Lumbar radiculopathy      Chronic pain syndrome 2022     Liver disease 08/10/2022     Hypertensive urgency 2022     Bronchitis, mucopurulent recurrent (HCC) 2022     Cirrhosis, alcoholic (HCC) 2022     Diabetic  peripheral neuropathy (HCC) 07/29/2022     Herniated nucleus pulposus of lumbosacral region 07/29/2022     Thyroid cancer (HCC) 08/19/2021     Alcoholism in remission (HCC) 07/30/2021     Benzodiazepine dependence (HCC) 07/30/2021     Bilateral adhesive capsulitis of shoulders 07/30/2021     DM (diabetes mellitus) (HCC) 07/30/2021     Hypercholesterolemia 07/30/2021     Lumbar spondylosis 07/30/2021       LOS (days): 3  Geometric Mean LOS (GMLOS) (days): 3  Days to GMLOS:0     OBJECTIVE:  Risk of Unplanned Readmission Score: 60.4         Current admission status: Inpatient   Preferred Pharmacy:   [facility] [address] [place] [id number]  Phone: [phone] Fax: [phone]    Primary Care Provider: RUIZ Hanks    Primary Insurance: [facility]  Secondary Insurance: [facility] [place]    DISCHARGE DETAILS:                                                                                        IMM Given (Date):: 02/19/25  IMM Given to:: Patient   IMM reviewed with patient, patient agrees with discharge determination.

## 2025-02-19 NOTE — ASSESSMENT & PLAN NOTE
Possibly in setting of erosive esophagitis/PUD in setting of NSAID use  Hgb 7.9 this AM.  No reports of bleeding; suspect partly dilutional in setting of IVF  EGD/colonoscopy planned for today  Trend CBC

## 2025-02-19 NOTE — CASE MANAGEMENT
[facility] received request for authorization from Care Manager.  Authorization request submitted for: [facility]  Facility Name: [facility] NPI: [id number]  Facility MD:Moustapha Schafer NPI: [id number]  Authorization initiated by contacting insurance: [employer]   Via: [employer]   Clinicals submitted via Portal attachment   Pending Reference #: [id number]     Per patient's insurance, no prior authorization request is required for BLS () transport.     Care Manager notified: Justice Agosto     Updates to authorization status will be noted in chart. Please reach out to CM for updates on any clinical information.

## 2025-02-19 NOTE — PROGRESS NOTES
Progress Note - Hospitalist   Name: Juan San 69 y.o. male I MRN: [id number]  Unit/Bed#: -01 I Date of Admission: 2/16/2025   Date of Service: 2/19/2025 I Hospital Day: 3    Assessment & Plan  Acute kidney injury (HCC)  Presented to the emergency department due to concern for dehydration, poor oral intake  Baseline creatinine 0.6-1.0   Presented with creatinine of 2.56 suspect in setting of hypovolemia, NSAID use  Creatinine improving with IV hydration, down to 1.12  Trend BMP  Decreased oral intake  Patient with ongoing reported poor oral intake   Reports intermittent epigastric discomfort, concerned for PUD - counseled avoiding NSAIDs  Plan for EGD/colonoscopy today    Lumbar spondylosis  Pt has hx of multiple spinal issues including discitis   Cont suppressive doxycycline  Cont to follow up w [facility]  Hypertension  Cont antihypertensives with hold parameters.  Iron deficiency anemia  Possibly in setting of erosive esophagitis/PUD in setting of NSAID use  Hgb 7.9 this AM.  No reports of bleeding; suspect partly dilutional in setting of IVF  EGD/colonoscopy planned for today  Trend CBC    Chronic diarrhea  Plan for colonoscopy today    VTE Pharmacologic Prophylaxis: VTE Score: 5 High Risk (Score >/= 5) - Pharmacological DVT Prophylaxis Ordered: enoxaparin (Lovenox). Sequential Compression Devices Ordered.    Mobility:   Basic Mobility Inpatient Raw Score: 17  JH-HLM Goal: 5: Stand one or more mins  JH-HLM Achieved: 6: Walk 10 steps or more  JH-HLM Goal achieved. Continue to encourage appropriate mobility.    Patient Centered Rounds: I performed bedside rounds with nursing staff today.   Discussions with Specialists or Other Care Team Provider: CM, GI    Education and Discussions with Family / Patient: Updated  (wife) via phone.    Current Length of Stay: 3 day(s)  Current Patient Status: Inpatient   Certification Statement: The patient will continue to require additional inpatient hospital  stay due to EGD/colonoscopy, disposition planning  Discharge Plan: Anticipate discharge in 24-48 hrs to rehab facility.    Code Status: Level 1 - Full Code    Subjective   Patient overall feels well, completed GoLytely prep overnight.  Denies chest pain/palpitations, nausea/vomiting, abdominal pain.    Objective :  Temp:  [97.6 °F (36.4 °C)-98 °F (36.7 °C)] 98 °F (36.7 °C)  HR:  [58-77] 71  BP: (116-158)/(72-78) 136/76  Resp:  [12-18] 16  SpO2:  [98 %-100 %] 100 %  O2 Device: None (Room air)    Body mass index is 23.8 kg/m².     Input and Output Summary (last 24 hours):     Intake/Output Summary (Last 24 hours) at 2/19/2025 0833  Last data filed at 2/19/2025 0537  Gross per 24 hour   Intake 3994.33 ml   Output 150 ml   Net 3844.33 ml       Physical Exam  Vitals and nursing note reviewed.   Constitutional:       Appearance: Normal appearance.      Comments: No acute distress   HENT:      Head: Normocephalic.   Eyes:      General: No scleral icterus.     Extraocular Movements: Extraocular movements intact.      Conjunctiva/sclera: Conjunctivae normal.   Cardiovascular:      Rate and Rhythm: Normal rate and regular rhythm.      Heart sounds: Normal heart sounds. No murmur heard.  Pulmonary:      Effort: Pulmonary effort is normal. No respiratory distress.      Breath sounds: Normal breath sounds. No wheezing, rhonchi or rales.   Abdominal:      General: Bowel sounds are normal.      Palpations: Abdomen is soft.      Tenderness: There is no abdominal tenderness. There is no guarding or rebound.   Musculoskeletal:      Cervical back: Normal range of motion.      Comments: Able to move upper/lower extremities bilaterally, no edema   Skin:     General: Skin is warm and dry.   Neurological:      Mental Status: He is alert and oriented to person, place, and time.   Psychiatric:         Mood and Affect: Mood normal.         Speech: Speech normal.         Behavior: Behavior normal.           Lines/Drains:              Lab  Results: I have reviewed the following results:   Results from last 7 days   Lab Units 02/19/25  0302   WBC Thousand/uL 7.30   HEMOGLOBIN g/dL 7.9*   HEMATOCRIT % 23.2*   PLATELETS Thousands/uL 248   SEGS PCT % 44   LYMPHO PCT % 45*   MONO PCT % 8   EOS PCT % 1     Results from last 7 days   Lab Units 02/19/25  0302   SODIUM mmol/L 142   POTASSIUM mmol/L 4.1   CHLORIDE mmol/L 114*   CO2 mmol/L 23   BUN mg/dL 30*   CREATININE mg/dL 1.12   ANION GAP mmol/L 5   CALCIUM mg/dL 7.8*   ALBUMIN g/dL 3.0*   TOTAL BILIRUBIN mg/dL 0.60   ALK PHOS U/L 50   ALT U/L 33   AST U/L 29   GLUCOSE RANDOM mg/dL 63*     Results from last 7 days   Lab Units 02/16/25  1737   INR  1.09     Results from last 7 days   Lab Units 02/19/25  0810 02/18/25  1602 02/18/25  1111 02/18/25  0822 02/17/25  1712 02/17/25  1152 02/17/25  0749 02/16/25  2008   POC GLUCOSE mg/dl 86 152* 155* 108 80 109 70 189*         Results from last 7 days   Lab Units 02/16/25  2041 02/16/25  1620 02/16/25  1612   LACTIC ACID mmol/L 2.9* 3.2*  --    PROCALCITONIN ng/ml  --   --  0.31*       Recent Cultures (last 7 days):   Results from last 7 days   Lab Units 02/16/25  1612   BLOOD CULTURE  No Growth at 48 hrs.   GRAM STAIN RESULT  Gram positive cocci in clusters*       Imaging Results Review: No pertinent imaging studies reviewed.  Other Study Results Review: No additional pertinent studies reviewed.    Last 24 Hours Medication List:     Current Facility-Administered Medications:     acetaminophen (TYLENOL) tablet 975 mg, Q8H CHRISTIE    busPIRone (BUSPAR) tablet 5 mg, BID PRN    clonazePAM (KlonoPIN) tablet 1 mg, BID    cyclobenzaprine (FLEXERIL) tablet 5 mg, TID PRN    doxycycline hyclate (VIBRAMYCIN) capsule 100 mg, BID    enoxaparin (LOVENOX) subcutaneous injection 30 mg, Daily    escitalopram (LEXAPRO) tablet 20 mg, Daily    finasteride (PROSCAR) tablet 5 mg, Daily    folic acid (FOLVITE) tablet 2,000 mcg, Daily    gabapentin (NEURONTIN) capsule 300 mg, TID     hydrOXYzine HCL (ATARAX) tablet 50 mg, BID    insulin lispro (HumALOG/ADMELOG) 100 units/mL subcutaneous injection 1-6 Units, TID AC **AND** Fingerstick Glucose (POCT), TID AC    lactated ringers infusion, Continuous, Last Rate: 100 mL/hr (02/19/25 0258)    lidocaine (LIDODERM) 5 % patch 1 patch, Daily    magnesium sulfate 2 g/50 mL IVPB (premix) 2 g, Once **FOLLOWED BY** magnesium sulfate 2 g/50 mL IVPB (premix) 2 g, Once    metoprolol succinate (TOPROL-XL) 24 hr tablet 50 mg, BID    mirtazapine (REMERON) tablet 45 mg, HS    NIFEdipine (PROCARDIA XL) 24 hr tablet 30 mg, Daily    NIFEdipine (PROCARDIA XL) 24 hr tablet 90 mg, Daily    oxyCODONE (ROXICODONE) IR tablet 5 mg, Q6H PRN    pantoprazole (PROTONIX) injection 40 mg, Q24H CHRISTIE    polyethylene glycol (MIRALAX) packet 17 g, Daily    pravastatin (PRAVACHOL) tablet 20 mg, Daily With Dinner    senna (SENOKOT) tablet 8.6 mg, BID    traMADol (ULTRAM) tablet 50 mg, Q12H PRN    Administrative Statements   Today, Patient Was Seen By: Sintia Soto PA-C  I have spent a total time of 35 minutes in caring for this patient on the day of the visit/encounter including Diagnostic results, Instructions for management, Patient and family education, Importance of tx compliance, Risk factor reductions, Impressions, Counseling / Coordination of care, Documenting in the medical record, Reviewing/placing orders in the medical record (including tests, medications, and/or procedures), Obtaining or reviewing history  , and Communicating with other healthcare professionals .    **Please Note: This note may have been constructed using a voice recognition system.**

## 2025-02-19 NOTE — QUICK NOTE
Brief colonoscopy report    IMPRESSION:  Medium hemorrhoids      RECOMMENDATION:  Repeat screening colonoscopy in 10 years, due: 2/17/2035   Resume regular diet and medications              Rodney Bryant MD

## 2025-02-19 NOTE — ANESTHESIA PREPROCEDURE EVALUATION
Procedure:  EGD  COLONOSCOPY - eval anemia, poor appetite    Relevant Problems   ANESTHESIA  No issues with prior GI procedures   (+) PONV (postoperative nausea and vomiting)      CARDIO   (+) Hypercholesterolemia   (+) Hypertension      ENDO   (+) Type 2 diabetes mellitus with hyperglycemia, without long-term current use of insulin (HCC)      GI/HEPATIC   (+) Cirrhosis, alcoholic (HCC)      /RENAL   (+) Acute kidney injury (HCC)      HEMATOLOGY   (+) Iron deficiency anemia      MUSCULOSKELETAL   (+) Chronic bilateral low back pain without sciatica   (+) Discitis of lumbosacral region      NEURO/PSYCH   (+) Anxiety and depression   (+) Chronic bilateral low back pain without sciatica   (+) Chronic pain syndrome   (+) Diabetic peripheral neuropathy (HCC)      PULMONARY  Mild nasal congestion, denies cough/SOB   (-) Sleep apnea   (-) Smoking      Endocrine   (+) DM (diabetes mellitus) (HCC)      Behavioral Health   (+) Alcoholism in remission (HCC)   (+) Medical marijuana use      Oncology   (+) Thyroid cancer (HCC)      Other   (+) Benign prostatic hyperplasia with urinary frequency      Physical Exam    Airway    Mallampati score: I  TM Distance: >3 FB  Neck ROM: full     Dental   Comment: Few intact lower teeth, denies loose     Cardiovascular      Pulmonary      Other Findings      Lab Results   Component Value Date    WBC 7.30 02/19/2025    HGB 7.9 (L) 02/19/2025     02/19/2025     Lab Results   Component Value Date    SODIUM 142 02/19/2025    K 4.1 02/19/2025    BUN 30 (H) 02/19/2025    CREATININE 1.12 02/19/2025    EGFR 67 02/19/2025     Lab Results   Component Value Date    HGBA1C 6.4 (H) 12/17/2024     Anesthesia Plan  ASA Score- 3     Anesthesia Type- IV sedation with anesthesia with ASA Monitors.         Additional Monitors:     Airway Plan:            Plan Factors-    Chart reviewed.   Existing labs reviewed. Patient summary reviewed.    Patient is not a current smoker.              Induction-  intravenous.    Postoperative Plan-     Perioperative Resuscitation Plan - Level 1 - Full Code.       Informed Consent- Anesthetic plan and risks discussed with patient.  I personally reviewed this patient with the CRNA. Discussed and agreed on the Anesthesia Plan with the CRNA..      NPO Status:  Vitals Value Taken Time   Date of last liquid 02/19/25 02/19/25 1112   Time of last liquid 0900 02/19/25 1112   Date of last solid 02/17/25 02/19/25 1112   Time of last solid     Meds taken 0900

## 2025-02-19 NOTE — ASSESSMENT & PLAN NOTE
Patient with ongoing reported poor oral intake   Reports intermittent epigastric discomfort, concerned for PUD - counseled avoiding NSAIDs  Plan for EGD/colonoscopy today

## 2025-02-20 ENCOUNTER — TELEPHONE (OUTPATIENT)
Dept: GASTROENTEROLOGY | Facility: CLINIC | Age: 70
End: 2025-02-20

## 2025-02-20 VITALS
RESPIRATION RATE: 18 BRPM | HEIGHT: 68 IN | HEART RATE: 73 BPM | DIASTOLIC BLOOD PRESSURE: 77 MMHG | BODY MASS INDEX: 23.64 KG/M2 | WEIGHT: 156 LBS | TEMPERATURE: 98.5 F | SYSTOLIC BLOOD PRESSURE: 144 MMHG | OXYGEN SATURATION: 100 %

## 2025-02-20 PROBLEM — K20.90 ESOPHAGITIS: Status: ACTIVE | Noted: 2025-02-20

## 2025-02-20 LAB
ANION GAP SERPL CALCULATED.3IONS-SCNC: 7 MMOL/L (ref 4–13)
BACTERIA BLD CULT: ABNORMAL
BASOPHILS # BLD AUTO: 0.05 THOUSANDS/ΜL (ref 0–0.1)
BASOPHILS NFR BLD AUTO: 1 % (ref 0–1)
BUN SERPL-MCNC: 15 MG/DL (ref 5–25)
CALCIUM SERPL-MCNC: 8.2 MG/DL (ref 8.4–10.2)
CHLORIDE SERPL-SCNC: 107 MMOL/L (ref 96–108)
CO2 SERPL-SCNC: 27 MMOL/L (ref 21–32)
CREAT SERPL-MCNC: 0.92 MG/DL (ref 0.6–1.3)
EOSINOPHIL # BLD AUTO: 0.1 THOUSAND/ΜL (ref 0–0.61)
EOSINOPHIL NFR BLD AUTO: 1 % (ref 0–6)
ERYTHROCYTE [DISTWIDTH] IN BLOOD BY AUTOMATED COUNT: 18.9 % (ref 11.6–15.1)
GFR SERPL CREATININE-BSD FRML MDRD: 85 ML/MIN/1.73SQ M
GLUCOSE SERPL-MCNC: 113 MG/DL (ref 65–140)
GLUCOSE SERPL-MCNC: 123 MG/DL (ref 65–140)
GLUCOSE SERPL-MCNC: 226 MG/DL (ref 65–140)
GLUCOSE SERPL-MCNC: 95 MG/DL (ref 65–140)
GRAM STN SPEC: ABNORMAL
HCT VFR BLD AUTO: 24 % (ref 36.5–49.3)
HGB BLD-MCNC: 8.1 G/DL (ref 12–17)
IMM GRANULOCYTES # BLD AUTO: 0.03 THOUSAND/UL (ref 0–0.2)
IMM GRANULOCYTES NFR BLD AUTO: 0 % (ref 0–2)
LYMPHOCYTES # BLD AUTO: 3.23 THOUSANDS/ΜL (ref 0.6–4.47)
LYMPHOCYTES NFR BLD AUTO: 43 % (ref 14–44)
MCH RBC QN AUTO: 26 PG (ref 26.8–34.3)
MCHC RBC AUTO-ENTMCNC: 33.8 G/DL (ref 31.4–37.4)
MCV RBC AUTO: 77 FL (ref 82–98)
MECA+MECC ISLT/SPM QL: DETECTED
MONOCYTES # BLD AUTO: 0.53 THOUSAND/ΜL (ref 0.17–1.22)
MONOCYTES NFR BLD AUTO: 7 % (ref 4–12)
NEUTROPHILS # BLD AUTO: 3.52 THOUSANDS/ΜL (ref 1.85–7.62)
NEUTS SEG NFR BLD AUTO: 48 % (ref 43–75)
NRBC BLD AUTO-RTO: 1 /100 WBCS
PLATELET # BLD AUTO: 185 THOUSANDS/UL (ref 149–390)
PMV BLD AUTO: 11.7 FL (ref 8.9–12.7)
POTASSIUM SERPL-SCNC: 3.4 MMOL/L (ref 3.5–5.3)
RBC # BLD AUTO: 3.12 MILLION/UL (ref 3.88–5.62)
S EPIDERMIDIS DNA BLD POS QL NAA+NON-PRB: DETECTED
SODIUM SERPL-SCNC: 141 MMOL/L (ref 135–147)
WBC # BLD AUTO: 7.46 THOUSAND/UL (ref 4.31–10.16)

## 2025-02-20 PROCEDURE — 99239 HOSP IP/OBS DSCHRG MGMT >30: CPT | Performed by: PHYSICIAN ASSISTANT

## 2025-02-20 PROCEDURE — NC001 PR NO CHARGE: Performed by: PHYSICIAN ASSISTANT

## 2025-02-20 PROCEDURE — 97535 SELF CARE MNGMENT TRAINING: CPT

## 2025-02-20 PROCEDURE — 85025 COMPLETE CBC W/AUTO DIFF WBC: CPT | Performed by: STUDENT IN AN ORGANIZED HEALTH CARE EDUCATION/TRAINING PROGRAM

## 2025-02-20 PROCEDURE — 99232 SBSQ HOSP IP/OBS MODERATE 35: CPT | Performed by: INTERNAL MEDICINE

## 2025-02-20 PROCEDURE — 82948 REAGENT STRIP/BLOOD GLUCOSE: CPT

## 2025-02-20 PROCEDURE — 80048 BASIC METABOLIC PNL TOTAL CA: CPT | Performed by: PHYSICIAN ASSISTANT

## 2025-02-20 RX ORDER — POTASSIUM CHLORIDE 1500 MG/1
40 TABLET, EXTENDED RELEASE ORAL ONCE
Status: COMPLETED | OUTPATIENT
Start: 2025-02-20 | End: 2025-02-20

## 2025-02-20 RX ORDER — CLONAZEPAM 1 MG/1
1 TABLET ORAL 2 TIMES DAILY
Qty: 20 TABLET | Refills: 0 | Status: SHIPPED | OUTPATIENT
Start: 2025-02-20

## 2025-02-20 RX ORDER — TRAMADOL HYDROCHLORIDE 50 MG/1
50 TABLET ORAL EVERY 8 HOURS PRN
Qty: 10 TABLET | Refills: 0 | Status: SHIPPED | OUTPATIENT
Start: 2025-02-20

## 2025-02-20 RX ORDER — PANTOPRAZOLE SODIUM 40 MG/1
40 TABLET, DELAYED RELEASE ORAL 2 TIMES DAILY
Start: 2025-02-20

## 2025-02-20 RX ADMIN — METOPROLOL SUCCINATE 50 MG: 50 TABLET, EXTENDED RELEASE ORAL at 09:08

## 2025-02-20 RX ADMIN — HYDROXYZINE HYDROCHLORIDE 50 MG: 25 TABLET, FILM COATED ORAL at 09:07

## 2025-02-20 RX ADMIN — ENOXAPARIN SODIUM 30 MG: 100 INJECTION SUBCUTANEOUS at 09:06

## 2025-02-20 RX ADMIN — POTASSIUM CHLORIDE 40 MEQ: 1500 TABLET, EXTENDED RELEASE ORAL at 10:55

## 2025-02-20 RX ADMIN — SODIUM CHLORIDE, SODIUM LACTATE, POTASSIUM CHLORIDE, AND CALCIUM CHLORIDE 100 ML/HR: .6; .31; .03; .02 INJECTION, SOLUTION INTRAVENOUS at 02:59

## 2025-02-20 RX ADMIN — ACETAMINOPHEN 975 MG: 325 TABLET, FILM COATED ORAL at 13:03

## 2025-02-20 RX ADMIN — INSULIN LISPRO 2 UNITS: 100 INJECTION, SOLUTION INTRAVENOUS; SUBCUTANEOUS at 12:06

## 2025-02-20 RX ADMIN — STANDARDIZED SENNA CONCENTRATE 8.6 MG: 8.6 TABLET ORAL at 17:42

## 2025-02-20 RX ADMIN — POLYETHYLENE GLYCOL 3350 17 G: 17 POWDER, FOR SOLUTION ORAL at 09:06

## 2025-02-20 RX ADMIN — FINASTERIDE 5 MG: 5 TABLET, FILM COATED ORAL at 09:08

## 2025-02-20 RX ADMIN — STANDARDIZED SENNA CONCENTRATE 8.6 MG: 8.6 TABLET ORAL at 09:07

## 2025-02-20 RX ADMIN — ACETAMINOPHEN 975 MG: 325 TABLET, FILM COATED ORAL at 06:14

## 2025-02-20 RX ADMIN — DOXYCYCLINE 100 MG: 100 CAPSULE ORAL at 17:42

## 2025-02-20 RX ADMIN — HYDROXYZINE HYDROCHLORIDE 50 MG: 25 TABLET, FILM COATED ORAL at 17:42

## 2025-02-20 RX ADMIN — ESCITALOPRAM OXALATE 20 MG: 20 TABLET ORAL at 09:08

## 2025-02-20 RX ADMIN — CLONAZEPAM 1 MG: 1 TABLET ORAL at 17:42

## 2025-02-20 RX ADMIN — FOLIC ACID 2000 MCG: 1 TABLET ORAL at 09:07

## 2025-02-20 RX ADMIN — CLONAZEPAM 1 MG: 1 TABLET ORAL at 09:07

## 2025-02-20 RX ADMIN — GABAPENTIN 300 MG: 300 CAPSULE ORAL at 09:07

## 2025-02-20 RX ADMIN — GABAPENTIN 300 MG: 300 CAPSULE ORAL at 17:42

## 2025-02-20 RX ADMIN — DOXYCYCLINE 100 MG: 100 CAPSULE ORAL at 09:07

## 2025-02-20 RX ADMIN — PRAVASTATIN SODIUM 20 MG: 20 TABLET ORAL at 17:42

## 2025-02-20 RX ADMIN — PANTOPRAZOLE SODIUM 40 MG: 40 INJECTION, POWDER, LYOPHILIZED, FOR SOLUTION INTRAVENOUS at 09:06

## 2025-02-20 NOTE — CASE MANAGEMENT
Case Management Discharge Planning Note    Patient name Juan San  Location /-01 MRN [id number]  : 1955 Date 2025       Current Admission Date: 2025  Current Admission Diagnosis:Acute kidney injury (HCC)   Patient Active Problem List    Diagnosis Date Noted Date Diagnosed    Esophagitis 2025     PONV (postoperative nausea and vomiting)      Chronic diarrhea 2025     Decreased oral intake 2025     Bipolar II disorder (HCC) 2024     Elevated troponin I level 10/06/2024     Ambulatory dysfunction 10/06/2024     SIRS (systemic inflammatory response syndrome) (HCC) 10/06/2024     Hypokalemia 10/06/2024     Metabolic acidosis, increased anion gap 10/06/2024     Prostatitis 2024     Lower extremity edema 2024     Venous stasis ulcers ([facility]) 2024     Acute kidney injury (HCC) 2024     Failure of joint fusion ([facility]) 2024     Lactic acidosis 2024     Type 2 diabetes mellitus with hyperglycemia, without long-term current use of insulin ([facility]) 2024     Foraminal stenosis of lumbar region 2024     Discitis of lumbosacral region 2024     Iron deficiency anemia 10/10/2023     Acute on chronic bilateral low back pain with sciatica 10/09/2023     Anxiety and depression 10/09/2023     Hypertension 10/09/2023     Benign prostatic hyperplasia with urinary frequency 2023     Scrotal pain 2023     Lower urinary tract symptoms 2023     Status post lumbar spinal fusion 2023     Hyponatremia 2023     Gallstones 2023     Anemia 2023     Urinary retention 2023     Nausea and vomiting      Medical marijuana use      Chronic bilateral low back pain without sciatica 2023     Lumbar radiculopathy      Chronic pain syndrome 2022     Liver disease 08/10/2022     Hypertensive urgency 2022     Bronchitis, mucopurulent recurrent (HCC) 2022     Cirrhosis, alcoholic (HCC)  07/29/2022     Diabetic peripheral neuropathy (HCC) 07/29/2022     Herniated nucleus pulposus of lumbosacral region 07/29/2022     Thyroid cancer (HCC) 08/19/2021     Alcoholism in remission (HCC) 07/30/2021     Benzodiazepine dependence (HCC) 07/30/2021     Bilateral adhesive capsulitis of shoulders 07/30/2021     DM (diabetes mellitus) (HCC) 07/30/2021     Hypercholesterolemia 07/30/2021     Lumbar spondylosis 07/30/2021       LOS (days): 4  Geometric Mean LOS (GMLOS) (days): 3  Days to GMLOS:-0.9     OBJECTIVE:  Risk of Unplanned Readmission Score: 53.87         Current admission status: Inpatient   Preferred Pharmacy:   [facility] [address] [place] [address] [address] [address]  Phone: [phone] Fax: [phone]    Primary Care Provider: RUIZ Hanks    [facility] Insurance: [facility]  Secondary Insurance: [facility]    DISCHARGE DETAILS:                                          Other Referral/Resources/Interventions Provided:  Interventions: Transportation, Short Term Rehab  Referral Comments: Auth approved. WC Van transport claimed in Roundtrip for 6pm for pt to attend [facility] at [facility]. Informed pt, pt's spouse, RN, JOANNA and [facility]. Review out of pocket cost of transport with pt. Provided RN with report numbers.         Treatment Team Recommendation: Short Term Rehab  Discharge Destination Plan:: Short Term Rehab  Transport at Discharge : Wheelchair van     Number/Name of Dispatcher: [phone]  Transported by (Company and Unit #): Kendra  ETA of Transport (Date): 02/20/25  ETA of Transport (Time): 1800

## 2025-02-20 NOTE — ASSESSMENT & PLAN NOTE
Patient with ongoing reported poor oral intake   Reports intermittent epigastric discomfort, concerned for PUD - counseled avoiding NSAIDs  EGD/colonoscopy with esophageal ulcers, gastritis.  Increase PPI to BID

## 2025-02-20 NOTE — ASSESSMENT & PLAN NOTE
69-year-old male with history of lumbar radiculopathy, status post lumbar fusion, discitis, chronic osteomyelitis on suppressive antibiotics, type 2 diabetes, bipolar 2 disorder, iron deficiency anemia who presents with poor oral intake for 2 weeks found to have LIGIA.   On NSAIDs chronically for pain.    EGD 2/19 w/ gastritis and mid-esophageal ulcers (related to doxycycline)      -Pantoprazole 40 mg p.o. twice a day  -Stressed the importance of sitting upright and drinking enough water with his medications (and particularly doxycycline)  -Avoid NSAIDs  -Encourage diet as tolerated

## 2025-02-20 NOTE — ASSESSMENT & PLAN NOTE
Presented to the emergency department due to concern for dehydration, poor oral intake  Baseline creatinine 0.6-1.0   Presented with creatinine of 2.56 suspect in setting of hypovolemia, NSAID use  Creatinine improved to 0.92  LIGIA resolved - discontinue IVF  Monitor as outpatient

## 2025-02-20 NOTE — ASSESSMENT & PLAN NOTE
In the setting of reduced oral intake, likely prerenal LIGIA.      -management per internal medicine

## 2025-02-20 NOTE — ASSESSMENT & PLAN NOTE
Possibly in setting of erosive esophagitis/PUD in setting of NSAID use  Hgb 7.9 this AM.  No reports of bleeding; suspect partly dilutional in setting of IVF  EGD/colonoscopy without active bleeding; noted esophageal ulcers, gastritis  Increase protonix to BID  Trend CBC

## 2025-02-20 NOTE — CASE MANAGEMENT
Per [employer], SNF auth still pending.     Escalation email sent to [employer] Team requesting expedite of determination.     CM notified: Justice Agosto

## 2025-02-20 NOTE — TELEPHONE ENCOUNTER
Rodney Bryant MD  P Gastroenterology [facility]  Going to rehab today.  Follow-up office visit with me in 6 to 8 weeks

## 2025-02-20 NOTE — ASSESSMENT & PLAN NOTE
Pt has hx of multiple spinal issues including discitis   Cont suppressive doxycycline  Cont to follow up w [facility]  PT/OT recommending rehab.  Medically stable for discharge, awaiting insurance authorization

## 2025-02-20 NOTE — PROGRESS NOTES
Progress Note - Hospitalist   Name: Juan San 69 y.o. male I MRN: [id number]  Unit/Bed#: -01 I Date of Admission: 2/16/2025   Date of Service: 2/20/2025 I Hospital Day: 4    Assessment & Plan  Acute kidney injury (HCC)  Presented to the emergency department due to concern for dehydration, poor oral intake  Baseline creatinine 0.6-1.0   Presented with creatinine of 2.56 suspect in setting of hypovolemia, NSAID use  Creatinine improved to 0.92  LIGIA resolved - discontinue IVF  Monitor as outpatient  Decreased oral intake  Patient with ongoing reported poor oral intake   Reports intermittent epigastric discomfort, concerned for PUD - counseled avoiding NSAIDs  EGD/colonoscopy with esophageal ulcers, gastritis.  Increase PPI to BID    Lumbar spondylosis  Pt has hx of multiple spinal issues including discitis   Cont suppressive doxycycline  Cont to follow up w [facility]  PT/OT recommending rehab.  Medically stable for discharge, awaiting insurance authorization  Hypertension  Cont antihypertensives with hold parameters.  Iron deficiency anemia  Possibly in setting of erosive esophagitis/PUD in setting of NSAID use  Hgb 7.9 this AM.  No reports of bleeding; suspect partly dilutional in setting of IVF  EGD/colonoscopy without active bleeding; noted esophageal ulcers, gastritis  Increase protonix to BID  Trend CBC      VTE Pharmacologic Prophylaxis: VTE Score: 5 High Risk (Score >/= 5) - Pharmacological DVT Prophylaxis Ordered: enoxaparin (Lovenox). Sequential Compression Devices Ordered.    Mobility:   Basic Mobility Inpatient Raw Score: 19  JH-HLM Goal: 6: Walk 10 steps or more  JH-HLM Achieved: 6: Walk 10 steps or more  JH-HLM Goal achieved. Continue to encourage appropriate mobility.    Patient Centered Rounds: I performed bedside rounds with nursing staff today.   Discussions with Specialists or Other Care Team Provider: CM, GI    Education and Discussions with Family / Patient: Attempted to update   (wife) via phone. Left voicemail.     Current Length of Stay: 4 day(s)  Current Patient Status: Inpatient   Certification Statement: The patient will continue to require additional inpatient hospital stay due to insurance auth pending  Discharge Plan: Anticipate discharge later today or tomorrow to rehab facility.    Code Status: Level 1 - Full Code    Subjective   Patient feels well.  Tolerating diet.  Denies chest pain/palpitations, shortness of breath, nausea/vomiting, abdominal pain.    Objective :  Temp:  [97.8 °F (36.6 °C)-98.4 °F (36.9 °C)] 98 °F (36.7 °C)  HR:  [63-76] 76  BP: (120-172)/(64-74) 120/70  Resp:  [12-18] 18  SpO2:  [97 %-100 %] 99 %  O2 Device: None (Room air)    Body mass index is 23.72 kg/m².     Input and Output Summary (last 24 hours):     Intake/Output Summary (Last 24 hours) at 2/20/2025 1204  Last data filed at 2/20/2025 1151  Gross per 24 hour   Intake 2200 ml   Output 1675 ml   Net 525 ml       Physical Exam  Vitals and nursing note reviewed.   Constitutional:       Appearance: Normal appearance.      Comments: No acute distress   HENT:      Head: Normocephalic.   Eyes:      General: No scleral icterus.     Extraocular Movements: Extraocular movements intact.      Conjunctiva/sclera: Conjunctivae normal.   Cardiovascular:      Rate and Rhythm: Normal rate and regular rhythm.      Heart sounds: Normal heart sounds. No murmur heard.  Pulmonary:      Effort: Pulmonary effort is normal. No respiratory distress.      Breath sounds: Normal breath sounds. No wheezing, rhonchi or rales.   Abdominal:      General: Bowel sounds are normal.      Palpations: Abdomen is soft.      Tenderness: There is no abdominal tenderness. There is no guarding or rebound.   Musculoskeletal:      Cervical back: Normal range of motion.      Comments: Able to move upper/lower ext bilaterally, no edema   Skin:     General: Skin is warm and dry.   Neurological:      Mental Status: He is alert and oriented to person,  place, and time.   Psychiatric:         Mood and Affect: Mood normal.         Speech: Speech normal.         Behavior: Behavior normal.           Lines/Drains:              Lab Results: I have reviewed the following results:   Results from last 7 days   Lab Units 02/20/25  0345   WBC Thousand/uL 7.46   HEMOGLOBIN g/dL 8.1*   HEMATOCRIT % 24.0*   PLATELETS Thousands/uL 185   SEGS PCT % 48   LYMPHO PCT % 43   MONO PCT % 7   EOS PCT % 1     Results from last 7 days   Lab Units 02/20/25  0949 02/19/25  0302   SODIUM mmol/L 141 142   POTASSIUM mmol/L 3.4* 4.1   CHLORIDE mmol/L 107 114*   CO2 mmol/L 27 23   BUN mg/dL 15 30*   CREATININE mg/dL 0.92 1.12   ANION GAP mmol/L 7 5   CALCIUM mg/dL 8.2* 7.8*   ALBUMIN g/dL  --  3.0*   TOTAL BILIRUBIN mg/dL  --  0.60   ALK PHOS U/L  --  50   ALT U/L  --  33   AST U/L  --  29   GLUCOSE RANDOM mg/dL 123 63*     Results from last 7 days   Lab Units 02/16/25  1737   INR  1.09     Results from last 7 days   Lab Units 02/20/25  1150 02/20/25  0817 02/19/25  1723 02/19/25  0810 02/18/25  1602 02/18/25  1111 02/18/25  0822 02/17/25  1712 02/17/25  1152 02/17/25  0749 02/16/25  2008   POC GLUCOSE mg/dl 226* 113 182* 86 152* 155* 108 80 109 70 189*         Results from last 7 days   Lab Units 02/16/25  2041 02/16/25  1620 02/16/25  1612   LACTIC ACID mmol/L 2.9* 3.2*  --    PROCALCITONIN ng/ml  --   --  0.31*       Recent Cultures (last 7 days):   Results from last 7 days   Lab Units 02/16/25  1612   BLOOD CULTURE  Staphylococcus epidermidis*  No Growth at 72 hrs.   GRAM STAIN RESULT  Gram positive cocci in clusters*       Imaging Results Review: I reviewed radiology reports from this admission including: procedure reports.  Other Study Results Review: No additional pertinent studies reviewed.    Last 24 Hours Medication List:     Current Facility-Administered Medications:     acetaminophen (TYLENOL) tablet 975 mg, Q8H CHRISTIE    busPIRone (BUSPAR) tablet 5 mg, BID PRN    clonazePAM (KlonoPIN)  tablet 1 mg, BID    cyclobenzaprine (FLEXERIL) tablet 5 mg, TID PRN    doxycycline hyclate (VIBRAMYCIN) capsule 100 mg, BID    enoxaparin (LOVENOX) subcutaneous injection 30 mg, Daily    escitalopram (LEXAPRO) tablet 20 mg, Daily    finasteride (PROSCAR) tablet 5 mg, Daily    folic acid (FOLVITE) tablet 2,000 mcg, Daily    gabapentin (NEURONTIN) capsule 300 mg, TID    hydrOXYzine HCL (ATARAX) tablet 50 mg, BID    insulin lispro (HumALOG/ADMELOG) 100 units/mL subcutaneous injection 1-6 Units, TID AC **AND** Fingerstick Glucose (POCT), TID AC    lidocaine (LIDODERM) 5 % patch 1 patch, Daily    metoprolol succinate (TOPROL-XL) 24 hr tablet 50 mg, BID    mirtazapine (REMERON) tablet 45 mg, HS    NIFEdipine (PROCARDIA XL) 24 hr tablet 30 mg, Daily    NIFEdipine (PROCARDIA XL) 24 hr tablet 90 mg, Daily    oxyCODONE (ROXICODONE) IR tablet 5 mg, Q6H PRN    pantoprazole (PROTONIX) injection 40 mg, Q12H CHRISTIE    polyethylene glycol (MIRALAX) packet 17 g, Daily    pravastatin (PRAVACHOL) tablet 20 mg, Daily With Dinner    senna (SENOKOT) tablet 8.6 mg, BID    Administrative Statements   Today, Patient Was Seen By: Sintia Soto PA-C  I have spent a total time of 35 minutes in caring for this patient on the day of the visit/encounter including Diagnostic results, Instructions for management, Patient and family education, Importance of tx compliance, Risk factor reductions, Impressions, Counseling / Coordination of care, Documenting in the medical record, Reviewing/placing orders in the medical record (including tests, medications, and/or procedures), and Communicating with other healthcare professionals .    **Please Note: This note may have been constructed using a voice recognition system.**

## 2025-02-20 NOTE — DISCHARGE SUMMARY
Discharge Summary - Hospitalist   Name: Juan San 69 y.o. male I MRN: [id number]  Unit/Bed#: -01 I Date of Admission: 2/16/2025   Date of Service: 2/20/2025 I Hospital Day: 4     Assessment & Plan  Acute kidney injury (HCC)  Presented to the emergency department due to concern for dehydration, poor oral intake  Baseline creatinine 0.6-1.0   Presented with creatinine of 2.56 suspect in setting of hypovolemia, NSAID use  Creatinine improved to 0.92  LIGIA resolved - discontinue IVF  Monitor as outpatient  Esophagitis  Noted on EGD 2/19 with esophageal ulcers  Discussed with GI -patient on doxycycline chronically at this time.  Given location of ulcers suspect may be caused by doxycycline if taken too close to when he is lying down  Recommend taking doxycycline and remaining upright with adequate fluid intake  Avoid NSAIDs  Continue PPI BID  Decreased oral intake  Patient with ongoing reported poor oral intake   Reports intermittent epigastric discomfort, concerned for PUD - counseled avoiding NSAIDs  EGD/colonoscopy with esophageal ulcers, gastritis.  Increase PPI to BID    Lumbar spondylosis  Pt has hx of multiple spinal issues including discitis   Cont suppressive doxycycline  [facility]  [facility] recommending [facility]hab.  Medically stable for discharge today  Hypertension  Cont antihypertensives with hold parameters.  Iron deficiency anemia  Possibly in setting of erosive esophagitis/PUD in setting of NSAID use  Hgb 7.9 this AM.  No reports of bleeding; suspect partly dilutional in setting of IVF  EGD/colonoscopy without active bleeding; noted esophageal ulcers, gastritis  Increase protonix to BID  Trend CBC as outpt       Medical Problems       Resolved Problems  Date Reviewed: 12/17/2024   None       Discharging Physician / Practitioner: Sintia Soto PA-C  PCP: RUIZ Hanks  Admission Date:   Admission Orders (From admission, onward)       Ordered        02/16/25 9527   INPATIENT ADMISSION  Once                          Discharge Date: 02/20/25    Consultations During Hospital Stay:  GI  PT/OT    Procedures Performed:   EGD 2/19: The duodenum appeared normal. Performed random biopsy using biopsy forceps.  Moderate erythematous mucosa in the stomach; performed cold forceps biopsy.  Multiple ulcers in the middle third of the esophagus with clean base (Russ III); performed cold forceps biopsy  Colonoscopy 2/19:  Internal medium hemorrhoids     Significant Findings / Test Results:   Patient creatinine 2.56  Blood culture positive x 1 for Staph epidermidis consistent with contaminant  Flu/COVID-negative    Incidental Findings:   None     Test Results Pending at Discharge (will require follow up):   None     Outpatient Tests Requested:  None    Complications:  None    Reason for Admission: Acute kidney injury    Hospital Course:   Juan San is a 69 y.o. male patient who originally presented to the hospital on 2/16/2025 due to decreased intake.    Past medical history significant for iron deficiency anemia, chronic pain, lumbar spondylosis.  Patient presented to the emergency department due to ongoing poor intake, epigastric pain.  Noted to have acute kidney injury, started on IV fluids.  Creatinine returned to baseline.  Gastroenterology was consulted due to reports of epigastric pain, underwent EGD/colonoscopy which showed esophagitis with esophageal ulcers.  Given location of ulcers, suspect patient may be lying down shortly after taking medications or not drinking enough water.  No evidence of active bleeding.  Recommended to continue Protonix twice daily.  Gastroenterology will follow up regarding biopsy results.  On day of discharge patient was afebrile, hemodynamically stable, tolerating diet and verbalized understanding for requested outpatient follow-up.  Stable for discharge to rehab today.    Please see above list of diagnoses and related plan for additional  information.     Condition at Discharge: stable    Discharge Day Visit / Exam:   * Please refer to separate progress note for these details *    Discussion with Family: Patient declined call to .     Discharge instructions/Information to patient and family:   See after visit summary for information provided to patient and family.      Provisions for Follow-Up Care:  See after visit summary for information related to follow-up care and any pertinent home health orders.      Mobility at time of Discharge:   Basic Mobility Inpatient Raw Score: 19  JH-HLM Goal: 6: Walk 10 steps or more  JH-HLM Achieved: 6: Walk 10 steps or more  HLM Goal achieved. Continue to encourage appropriate mobility.     Disposition:   Other Skilled Nursing Facility at [facility]    Planned Readmission: None    Discharge Medications:  See after visit summary for reconciled discharge medications provided to patient and/or family.      Administrative Statements   Discharge Statement:  I have spent a total time of 60 minutes in caring for this patient on the day of the visit/encounter. >30 minutes of time was spent on: Diagnostic results, Instructions for management, Patient and family education, Importance of tx compliance, Risk factor reductions, Impressions, Counseling / Coordination of care, Documenting in the medical record, Reviewing / ordering tests, medicine, procedures  , and Communicating with other healthcare professionals .    **Please Note: This note may have been constructed using a voice recognition system**

## 2025-02-20 NOTE — PROGRESS NOTES
Progress Note - Gastroenterology   Name: Juan San 69 y.o. male I MRN: [id number]  Unit/Bed#: -01 I Date of Admission: 2/16/2025   Date of Service: 2/20/2025 I Hospital Day: 4    Assessment & Plan  Decreased oral intake  69-year-old male with history of lumbar radiculopathy, status post lumbar fusion, discitis, chronic osteomyelitis on suppressive antibiotics, type 2 diabetes, bipolar 2 disorder, iron deficiency anemia who presents with poor oral intake for 2 weeks found to have LIGIA.   On NSAIDs chronically for pain.    EGD 2/19 w/ gastritis and mid-esophageal ulcers (related to doxycycline)      -Pantoprazole 40 mg p.o. twice a day  -Stressed the importance of sitting upright and drinking enough water with his medications (and particularly doxycycline)  -Avoid NSAIDs  -Encourage diet as tolerated  Esophagitis  Mid esophageal ulcer suspect pill related rather than reflux  Iron deficiency anemia  Anemia, hemoglobin 8.1 today, baseline appears to be 10-11. History of iron deficiency in the past.  He has no current overt bleeding.   Iron panel 2/17; iron 104, iron sat 86, TIBC 120.4, ferritin 137.  Carries a diagnosis of iron deficiency anemia but believe there is a significant bone and of anemia of chronic disease as well  EGD 2/19 with gastritis and esophageal ulcers.  Colonoscopy 2/19 with hemorrhoids    -Follow for now  Acute kidney injury (HCC)  In the setting of reduced oral intake, likely prerenal LIGIA.      -management per internal medicine      Okay to discharge to rehab today.  I will arrange outpatient follow-up with me      I have discussed the above management plan in detail with the primary service.     Subjective   Tolerating regular diet.  No nausea/vomiting/pain    Objective :  Temp:  [97.8 °F (36.6 °C)-98.7 °F (37.1 °C)] 98 °F (36.7 °C)  HR:  [63-80] 76  BP: (120-175)/(64-81) 120/70  Resp:  [12-18] 17  SpO2:  [97 %-100 %] 99 %  O2 Device: None (Room air)    Physical Exam  General appearance:  alert, appears stated age and cooperative  Eyes: PERLLA, EOMI, no icterus   Head: Normocephalic, without obvious abnormality, atraumatic  Lungs: clear to auscultation bilaterally  Heart: regular rate and rhythm, S1, S2 normal, no murmur, click, rub or gallop  Abdomen: soft, non-tender; bowel sounds normal; no masses,  no organomegaly  Extremities: extremities normal, atraumatic, no cyanosis or edema  Neurologic: Grossly normal      Lab Results: I have reviewed the following results:CBC/BMP:   .     02/20/25  0345   WBC 7.46   HGB 8.1*   HCT 24.0*                    no

## 2025-02-20 NOTE — ASSESSMENT & PLAN NOTE
Noted on EGD 2/19 with esophageal ulcers  Discussed with GI -patient on doxycycline chronically at this time.  Given location of ulcers suspect may be caused by doxycycline if taken too close to when he is lying down  Recommend taking doxycycline and remaining upright with adequate fluid intake  Avoid NSAIDs  Continue PPI BID

## 2025-02-20 NOTE — PLAN OF CARE
Problem: Potential for Falls  Goal: Patient will remain free of falls  Description: INTERVENTIONS:  - Educate patient/family on patient safety including physical limitations  - Instruct patient to call for assistance with activity   - Consult OT/PT to assist with strengthening/mobility   - Keep Call bell within reach  - Keep bed low and locked with side rails adjusted as appropriate  - Keep care items and personal belongings within reach  - Initiate and maintain comfort rounds  - Make Fall Risk Sign visible to staff  - Offer Toileting every 2 Hours, in advance of need  - Initiate/Maintain 2alarm  - Obtain necessary fall risk management equipment: 2  - Apply yellow socks and bracelet for high fall risk patients  - Consider moving patient to room near nurses station  Outcome: Progressing     Problem: PAIN - ADULT  Goal: Verbalizes/displays adequate comfort level or baseline comfort level  Description: Interventions:  - Encourage patient to monitor pain and request assistance  - Assess pain using appropriate pain scale  - Administer analgesics based on type and severity of pain and evaluate response  - Implement non-pharmacological measures as appropriate and evaluate response  - Consider cultural and social influences on pain and pain management  - Notify physician/advanced practitioner if interventions unsuccessful or patient reports new pain  Outcome: Progressing     Problem: INFECTION - ADULT  Goal: Absence or prevention of progression during hospitalization  Description: INTERVENTIONS:  - Assess and monitor for signs and symptoms of infection  - Monitor lab/diagnostic results  - Monitor all insertion sites, i.e. indwelling lines, tubes, and drains  - Monitor endotracheal if appropriate and nasal secretions for changes in amount and color  - [place] appropriate cooling/warming therapies per order  - Administer medications as ordered  - Instruct and encourage patient and family to use good hand hygiene  technique  - Identify and instruct in appropriate isolation precautions for identified infection/condition  Outcome: Progressing  Goal: Absence of fever/infection during neutropenic period  Description: INTERVENTIONS:  - Monitor WBC    Outcome: Progressing

## 2025-02-20 NOTE — OCCUPATIONAL THERAPY NOTE
"  Occupational Therapy Treatment Note     Patient Name: Juan San  Today's Date: 2/20/2025  Problem List  Principal Problem:    Acute kidney injury (HCC)  Active Problems:    Lumbar spondylosis    Hypertension    Iron deficiency anemia    Decreased oral intake    Chronic diarrhea    PONV (postoperative nausea and vomiting)    Esophagitis        02/20/25 1442   OT Last Visit   OT Visit Date 02/20/25   Note Type   Note Type Treatment   Pain Assessment   Pain Assessment Tool 0-10   Pain Score No Pain   Restrictions/Precautions   Weight Bearing Precautions Per Order No   Other Precautions Chair Alarm;Bed Alarm;Fall Risk;Multiple lines;Spinal precautions;Pain   ADL   LB Dressing Assistance 5  Supervision/Setup   LB Dressing Deficit Use of adaptive equipment;Thread RLE into pants;Thread LLE into pants;Don/doff L sock;Don/doff R sock;Increased time to complete   LB Dressing Comments Pt able to don/doff socks with use of sock aid and don pants with use of reacher   Bed Mobility   Additional Comments Pt OOB in recliner chair upon arrival for OT treatment session   Subjective   Subjective \"Thank you for showing me this stuff.\"   Cognition   Overall Cognitive Status WFL   Arousal/Participation Alert;Cooperative   Attention Attends with cues to redirect   Orientation Level Oriented X4   Memory Decreased recall of precautions;Decreased recall of recent events   Following Commands Follows one step commands with increased time or repetition   Comments Pt willing to participate in OT treatment session   Activity Tolerance   Activity Tolerance Patient tolerated treatment well   Medical Staff Made Aware NATE Hanson   Assessment   Assessment Pt seen for OT tx session with focus on LB ADLs. Patient agreeable to OT treatment session. Pt received seated OOB to Recliner.  Pt demonstrated good sitting balance and trunk control. Discussed LB dressing techniques and demonstrated use of LHAE for LB dressing to increase independence. " Demonstrated use of LHAE. Pt trailed sock aid x4 times for donning socks and reacher for donning pants with supervision seated in recliner chair. Pt with good understanding of benefit and use of equipment. Educated patient on how to obtain equipment if desired at a later time. Pt continues to be functioning below baseline level, occupational performance remains limited secondary to factors listed above, and pt at increased risk for falls and injury. The patient's raw score on the AM-PAC Daily Activity inpatient short form is 19, standardized score is 40.22, greater than and less than 39.4. Patients at this level are likely to benefit from DC to home. Please refer to the recommendation of the Occupational Therapist for safe DC planning.  From OT standpoint, recommendation at time of D/C would be DC with Level II - Moderate Rehab Resource Intensity resources. Patient to benefit from continued Occupational Therapy treatment while in the hospital to address deficits as defined above and maximize level of functional independence with ADLs and functional mobility. Pt left seated OOB to Recliner with call bell in reach, tray table in reach, needs met, chair alarm activated, and RN informed.   Plan   Treatment Interventions ADL retraining;Functional transfer training;UE strengthening/ROM;Endurance training;Cognitive reorientation;Patient/family training;Equipment evaluation/education;Compensatory technique education;Continued evaluation   Goal Expiration Date 02/28/25   OT Treatment Day 0   OT Frequency 2-3x/wk   Discharge Recommendation   Rehab Resource Intensity Level, OT II (Moderate Resource Intensity)   AM-PAC Daily Activity Inpatient   Lower Body Dressing 3   Bathing 3   Toileting 3   Upper Body Dressing 3   Grooming 3   Eating 4   Daily Activity Raw Score 19   Daily Activity Standardized Score (Calc for Raw Score >=11) 40.22   AM-PAC Applied Cognition Inpatient   Following a Speech/Presentation 3   Understanding  Ordinary Conversation 3   Taking Medications 3   Remembering Where Things Are Placed or Put Away 3   Remembering List of 4-5 Errands 3   Taking Care of Complicated Tasks 3   Applied Cognition Raw Score 18   Applied Cognition Standardized Score 38.07   End of Consult   Education Provided Yes   Patient Position at End of Consult Bedside chair;Bed/Chair alarm activated;All needs within reach   Nurse Communication Nurse aware of consult         Madeleine Dooley (Barbara), OTR/L

## 2025-02-20 NOTE — ASSESSMENT & PLAN NOTE
Anemia, hemoglobin 8.1 today, baseline appears to be 10-11. History of iron deficiency in the past.  He has no current overt bleeding.   Iron panel 2/17; iron 104, iron sat 86, TIBC 120.4, ferritin 137.  Carries a diagnosis of iron deficiency anemia but believe there is a significant bone and of anemia of chronic disease as well  EGD 2/19 with gastritis and esophageal ulcers.  Colonoscopy 2/19 with hemorrhoids    -Follow for now

## 2025-02-20 NOTE — PROGRESS NOTES
Patient:  WINSOME LUGO    MRN:  [id number]    Aidin Request ID:  [id number]    Level of care reserved:  Skilled Nursing Facility    Partner Reserved:  Rosanne Cuevas, ABDIRASHID Ziegler [id number] [phone]    Clinical needs requested:    Geography searched:  15 miles around [id number]    Start of Service:    Request sent:  5:11pm EST on 2/18/2025 by Justice Steele    Partner reserved:  9:57am EST on 2/19/2025 by Justice Steele    Choice list shared:  9:57am EST on 2/19/2025 by Justice Steele

## 2025-02-20 NOTE — ASSESSMENT & PLAN NOTE
Possibly in setting of erosive esophagitis/PUD in setting of NSAID use  Hgb 7.9 this AM.  No reports of bleeding; suspect partly dilutional in setting of IVF  EGD/colonoscopy without active bleeding; noted esophageal ulcers, gastritis  Increase protonix to BID  Trend CBC as outpt

## 2025-02-20 NOTE — CASE MANAGEMENT
Case Management Discharge Planning Note    Patient name Juan San  Location /-01 MRN [id number]  : 1955 Date 2025       Current Admission Date: 2025  Current Admission Diagnosis:Acute kidney injury (HCC)   Patient Active Problem List    Diagnosis Date Noted Date Diagnosed    Esophagitis 2025     PONV (postoperative nausea and vomiting)      Chronic diarrhea 2025     Decreased oral intake 2025     Bipolar II disorder (HCC) 2024     Elevated troponin I level 10/06/2024     Ambulatory dysfunction 10/06/2024     SIRS (systemic inflammatory response syndrome) (HCC) 10/06/2024     Hypokalemia 10/06/2024     Metabolic acidosis, increased anion gap 10/06/2024     Prostatitis 2024     Lower extremity edema 2024     Venous stasis ulcers ([facility]) 2024     Acute kidney injury (HCC) 2024     Failure of joint fusion ([facility]) 2024     Lactic acidosis 2024     Type 2 diabetes mellitus with hyperglycemia, without long-term current use of insulin ([facility]) 2024     Foraminal stenosis of lumbar region 2024     Discitis of lumbosacral region 2024     Iron deficiency anemia 10/10/2023     Acute on chronic bilateral low back pain with sciatica 10/09/2023     Anxiety and depression 10/09/2023     Hypertension 10/09/2023     Benign prostatic hyperplasia with urinary frequency 2023     Scrotal pain 2023     Lower urinary tract symptoms 2023     Status post lumbar spinal fusion 2023     Hyponatremia 2023     Gallstones 2023     Anemia 2023     Urinary retention 2023     Nausea and vomiting      Medical marijuana use      Chronic bilateral low back pain without sciatica 2023     Lumbar radiculopathy      Chronic pain syndrome 2022     Liver disease 08/10/2022     Hypertensive urgency 2022     Bronchitis, mucopurulent recurrent (HCC) 2022     Cirrhosis, alcoholic (HCC)  07/29/2022     Diabetic peripheral neuropathy (HCC) 07/29/2022     Herniated nucleus pulposus of lumbosacral region 07/29/2022     Thyroid cancer (HCC) 08/19/2021     Alcoholism in remission (HCC) 07/30/2021     Benzodiazepine dependence (HCC) 07/30/2021     Bilateral adhesive capsulitis of shoulders 07/30/2021     DM (diabetes mellitus) (HCC) 07/30/2021     Hypercholesterolemia 07/30/2021     Lumbar spondylosis 07/30/2021       LOS (days): 4  Geometric Mean LOS (GMLOS) (days): 3  Days to GMLOS:-0.9     OBJECTIVE:  Risk of Unplanned Readmission Score: 53.87         Current admission status: Inpatient   Preferred Pharmacy:   [facility] [address] [place] [address] - [address] [place] [id number]  Phone: [phone] Fax: [phone]    Primary Care Provider: RUIZ Hanks    Primary Insurance: [employer]BERTO SIMS  Secondary Insurance: [facility]    DISCHARGE DETAILS:                                                                                                               Facility Insurance Auth Number: [id number]

## 2025-02-20 NOTE — PLAN OF CARE
Problem: OCCUPATIONAL THERAPY ADULT  Goal: Performs self-care activities at highest level of function for planned discharge setting.  See evaluation for individualized goals.  Description: Treatment Interventions: ADL retraining, Functional transfer training, UE strengthening/ROM, Endurance training, Cognitive reorientation, Patient/family training, Equipment evaluation/education, Compensatory technique education, Continued evaluation          See flowsheet documentation for full assessment, interventions and recommendations.   Note: Limitation: Decreased ADL status, Decreased UE strength, Decreased cognition, Decreased Safe judgement during ADL, Decreased endurance, Decreased high-level ADLs, Decreased self-care trans  Prognosis: Fair  Assessment: Pt seen for OT tx session with focus on LB ADLs. Patient agreeable to OT treatment session. Pt received seated OOB to Recliner.  Pt demonstrated good sitting balance and trunk control. Discussed LB dressing techniques and demonstrated use of LHAE for LB dressing to increase independence. Demonstrated use of LHAE. Pt trailed sock aid x4 times for donning socks and reacher for donning pants with supervision seated in recliner chair. Pt with good understanding of benefit and use of equipment. Educated patient on how to obtain equipment if desired at a later time. Pt continues to be functioning below baseline level, occupational performance remains limited secondary to factors listed above, and pt at increased risk for falls and injury. The patient's raw score on the AM-PAC Daily Activity inpatient short form is 19, standardized score is 40.22, greater than and less than 39.4. Patients at this level are likely to benefit from DC to home. Please refer to the recommendation of the Occupational Therapist for safe DC planning.  From OT standpoint, recommendation at time of D/C would be DC with Level II - Moderate Rehab Resource Intensity resources. Patient to benefit from  continued Occupational Therapy treatment while in the hospital to address deficits as defined above and maximize level of functional independence with ADLs and functional mobility. Pt left seated OOB to Recliner with call bell in reach, tray table in reach, needs met, chair alarm activated, and RN informed.     Rehab Resource Intensity Level, OT: II (Moderate Resource Intensity)

## 2025-02-20 NOTE — PLAN OF CARE
Problem: Potential for Falls  Goal: Patient will remain free of falls  Description: INTERVENTIONS:  - Educate patient/family on patient safety including physical limitations  - Instruct patient to call for assistance with activity   - Consult OT/PT to assist with strengthening/mobility   - Keep Call bell within reach  - Keep bed low and locked with side rails adjusted as appropriate  - Keep care items and personal belongings within reach  - Initiate and maintain comfort rounds  - Make Fall Risk Sign visible to staff  - Offer Toileting every 2 Hours, in advance of need  - Initiate/Maintain 2alarm  - Obtain necessary fall risk management equipment: 2  - Apply yellow socks and bracelet for high fall risk patients  - Consider moving patient to room near nurses station  Outcome: Progressing     Problem: PAIN - ADULT  Goal: Verbalizes/displays adequate comfort level or baseline comfort level  Description: Interventions:  - Encourage patient to monitor pain and request assistance  - Assess pain using appropriate pain scale  - Administer analgesics based on type and severity of pain and evaluate response  - Implement non-pharmacological measures as appropriate and evaluate response  - Consider cultural and social influences on pain and pain management  - Notify physician/advanced practitioner if interventions unsuccessful or patient reports new pain  Outcome: Progressing     Problem: INFECTION - ADULT  Goal: Absence or prevention of progression during hospitalization  Description: INTERVENTIONS:  - Assess and monitor for signs and symptoms of infection  - Monitor lab/diagnostic results  - Monitor all insertion sites, i.e. indwelling lines, tubes, and drains  - Monitor endotracheal if appropriate and nasal secretions for changes in amount and color  - [place] appropriate cooling/warming therapies per order  - Administer medications as ordered  - Instruct and encourage patient and family to use good hand hygiene  technique  - Identify and instruct in appropriate isolation precautions for identified infection/condition  Outcome: Progressing  Goal: Absence of fever/infection during neutropenic period  Description: INTERVENTIONS:  - Monitor WBC    Outcome: Progressing     Problem: SAFETY ADULT  Goal: Patient will remain free of falls  Description: INTERVENTIONS:  - Educate patient/family on patient safety including physical limitations  - Instruct patient to call for assistance with activity   - Consult OT/PT to assist with strengthening/mobility   - Keep Call bell within reach  - Keep bed low and locked with side rails adjusted as appropriate  - Keep care items and personal belongings within reach  - Initiate and maintain comfort rounds  - Make Fall Risk Sign visible to staff  - Offer Toileting every 2 Hours, in advance of need  - Initiate/Maintain 2alarm  - Obtain necessary fall risk management equipment: 2  - Apply yellow socks and bracelet for high fall risk patients  - Consider moving patient to room near nurses station  Outcome: Progressing  Goal: Maintain or return to baseline ADL function  Description: INTERVENTIONS:  -  Assess patient's ability to carry out ADLs; assess patient's baseline for ADL function and identify physical deficits which impact ability to perform ADLs (bathing, care of mouth/teeth, toileting, grooming, dressing, etc.)  - Assess/evaluate cause of self-care deficits   - Assess range of motion  - Assess patient's mobility; develop plan if impaired  - Assess patient's need for assistive devices and provide as appropriate  - Encourage maximum independence but intervene and supervise when necessary  - Involve family in performance of ADLs  - Assess for home care needs following discharge   - Consider OT consult to assist with ADL evaluation and planning for discharge  - Provide patient education as appropriate  Outcome: Progressing  Goal: Maintains/Returns to pre admission functional level  Description:  INTERVENTIONS:  - Perform AM-PAC 6 Click Basic Mobility/ Daily Activity assessment daily.  - Set and communicate daily mobility goal to care team and patient/family/caregiver.   - Collaborate with rehabilitation services on mobility goals if consulted  - Perform Range of Motion 2 times a day.  - Reposition patient every 2 hours.  - Dangle patient 2 times a day  - Stand patient 2 times a day  - Ambulate patient 2 times a day  - Out of bed to chair 2 times a day   - Out of bed for meals 2 times a day  - Out of bed for toileting  - Record patient progress and toleration of activity level   Outcome: Progressing     Problem: DISCHARGE PLANNING  Goal: Discharge to home or other facility with appropriate resources  Description: INTERVENTIONS:  - Identify barriers to discharge w/patient and caregiver  - Arrange for needed discharge resources and transportation as appropriate  - Identify discharge learning needs (meds, wound care, etc.)  - Arrange for interpretive services to assist at discharge as needed  - Refer to Case Management Department for coordinating discharge planning if the patient needs post-hospital services based on physician/advanced practitioner order or complex needs related to functional status, cognitive ability, or social support system  Outcome: Progressing     Problem: Knowledge Deficit  Goal: Patient/family/caregiver demonstrates understanding of disease process, treatment plan, medications, and discharge instructions  Description: Complete learning assessment and assess knowledge base.  Interventions:  - Provide teaching at level of understanding  - Provide teaching via preferred learning methods  Outcome: Progressing     Problem: Prexisting or High Potential for Compromised Skin Integrity  Goal: Skin integrity is maintained or improved  Description: INTERVENTIONS:  - Identify patients at risk for skin breakdown  - Assess and monitor skin integrity  - Assess and monitor nutrition and hydration  status  - Monitor labs   - Assess for incontinence   - Turn and reposition patient  - Assist with mobility/ambulation  - Relieve pressure over bony prominences  - Avoid friction and shearing  - Provide appropriate hygiene as needed including keeping skin clean and dry  - Evaluate need for skin moisturizer/barrier cream  - Collaborate with interdisciplinary team   - Patient/family teaching  - Consider wound care consult   Outcome: Progressing

## 2025-02-20 NOTE — CASE MANAGEMENT
[facility] has received APPROVED authorization.  Insurance: [employer]   Auth obtained via Insurance Rep: Zoe CHUN#: [phone]  Authorization received for: [facility]  Facility: [facility]   Authorization #: [id number]   Start of Care: 02/20  Next Review Date: 02/26  Submit next review to #: [phone]     Care Manager notified: Justice Agosto     Please reach out to  for updates on any clinical information.

## 2025-02-20 NOTE — PROGRESS NOTES
"Intended visit with pt \"Juan\" who is with medical staff at this time. Interfaith blessing offered outside of pt room. Available to follow upon request.     Thank you!       02/20/25 1200   Clinical Encounter Type   Visited With Patient not available   Routine Visit Introduction       "

## 2025-02-20 NOTE — ASSESSMENT & PLAN NOTE
Pt has hx of multiple spinal issues including discitis   Cont suppressive doxycycline  Cont to follow up w [facility]  PT/OT recommending rehab.  Medically stable for discharge today

## 2025-02-21 ENCOUNTER — NURSING HOME VISIT (OUTPATIENT)
Dept: FAMILY MEDICINE CLINIC | Facility: CLINIC | Age: 70
End: 2025-02-21
Payer: COMMERCIAL

## 2025-02-21 ENCOUNTER — PATIENT OUTREACH (OUTPATIENT)
Dept: CASE MANAGEMENT | Facility: OTHER | Age: 70
End: 2025-02-21

## 2025-02-21 ENCOUNTER — TRANSITIONAL CARE MANAGEMENT (OUTPATIENT)
Dept: FAMILY MEDICINE CLINIC | Facility: CLINIC | Age: 70
End: 2025-02-21

## 2025-02-21 DIAGNOSIS — N17.9 ACUTE KIDNEY INJURY (HCC): ICD-10-CM

## 2025-02-21 DIAGNOSIS — E11.65 TYPE 2 DIABETES MELLITUS WITH HYPERGLYCEMIA, WITHOUT LONG-TERM CURRENT USE OF INSULIN (HCC): ICD-10-CM

## 2025-02-21 DIAGNOSIS — K20.90 ESOPHAGITIS: Primary | ICD-10-CM

## 2025-02-21 DIAGNOSIS — M51.27 HERNIATED NUCLEUS PULPOSUS OF LUMBOSACRAL REGION: ICD-10-CM

## 2025-02-21 DIAGNOSIS — Z98.1 STATUS POST LUMBAR SPINAL FUSION: ICD-10-CM

## 2025-02-21 DIAGNOSIS — M54.42 ACUTE BILATERAL LOW BACK PAIN WITH BILATERAL SCIATICA: ICD-10-CM

## 2025-02-21 DIAGNOSIS — M47.816 LUMBAR SPONDYLOSIS: ICD-10-CM

## 2025-02-21 DIAGNOSIS — M54.41 ACUTE BILATERAL LOW BACK PAIN WITH BILATERAL SCIATICA: ICD-10-CM

## 2025-02-21 LAB — BACTERIA BLD CULT: NORMAL

## 2025-02-21 PROCEDURE — 99306 1ST NF CARE HIGH MDM 50: CPT | Performed by: FAMILY MEDICINE

## 2025-02-21 NOTE — UTILIZATION REVIEW
NOTIFICATION OF ADMISSION DISCHARGE   This is a Notification of Discharge from [facility]. Please be advised that this patient has been discharge from our facility. Below you will find the admission and discharge date and time including the patient’s disposition.   UTILIZATION REVIEW CONTACT:  Ashly Resendiz  Utilization   Network Utilization Review Department  Phone: [phone] x carefully listen to the prompts. All voicemails are confidential.  Email: [email or web address]     ADMISSION INFORMATION  PRESENTATION DATE: 2/16/2025  3:47 PM  OBERVATION ADMISSION DATE: N/A  INPATIENT ADMISSION DATE: 2/16/25  5:22 PM   DISCHARGE DATE: 2/20/2025  8:45 AM   DISPOSITION:Non [facility] SNF/TCU/SNU    Network Utilization Review Department  ATTENTION: Please call with any questions or concerns to [phone] and carefully listen to the prompts so that you are directed to the right person. All voicemails are confidential.   For Discharge needs, contact Care Management DC Support Team at [phone] opt. 2  Send all requests for admission clinical reviews, approved or denied determinations and any other requests to dedicated fax number below belonging to the campus where the patient is receiving treatment. List of dedicated fax numbers for the Facilities:  FACILITY NAME UR FAX NUMBER   ADMISSION DENIALS (Administrative/Medical Necessity) [phone]   DISCHARGE SUPPORT TEAM ([facility]) [phone]   PARENT CHILD HEALTH (Maternity/NICU/Pediatrics) [phone]   [facility] [phone]   [facility] [phone]   [facility] [phone]   General acute hospital [phone]   formerly [facility] [phone]   [facility] [phone]   [facility] [phone]   [facility]  [phone]   [facility] [phone]   [facility] [phone]   [facility] [phone]   [facility] [phone]

## 2025-02-21 NOTE — PROGRESS NOTES
[facility] [place]  [address] [place] [address]  Facility: [facility]    NAME: Juan San  AGE: 69 y.o. SEX: male    DATE OF ENCOUNTER: 2/21/2025    Code status:  Full Code    Assessment and Plan     1. Esophagitis  2. Acute kidney injury (HCC)  3. Herniated nucleus pulposus of lumbosacral region  4. Lumbar spondylosis  5. Acute bilateral low back pain with bilateral sciatica  6. Type 2 diabetes mellitus with hyperglycemia, without long-term current use of insulin (HCC)  7. Status post lumbar spinal fusion      All medications and routine orders were reviewed and updated as needed.    Plan discussed with: Patient    Chief Complaint     Seen for admission at Nursing Home    History of Present Illness     69-year-old male here after hospitalization for acute kidney injury.  The patient has been on doxycycline chronically because of an infection in the hardware of his back.  He frequently takes the medication and then lies down.  As a result he was found to have esophageal ulcers and esophagitis.  He needs to continue this medication.  His intake had been diminished significantly.  This resulted in the acute kidney injury.  He is currently taking omeprazole which is providing some relief.  He continues to have chronic back pain and uses tramadol on a regular basis.  Will also occasionally use muscle relaxers.  He is under the care of the neurosurgeon.  He notes a slow urinary stream.  Bowels have been stable.  He denies any dyspnea.  He has a history of alcohol use that is in remission.    HISTORY:  Past Medical History:   Diagnosis Date    Allergic     Anemia     Anxiety     Arthritis     Cancer (HCC)     Chronic back pain     Depression     Diabetes mellitus (HCC)     Hypertension     Liver disease     Mitral valve prolapse     Peripheral neuropathy     Neuropathy    PONV (postoperative nausea and vomiting)     Thyroid disease      Past Surgical History:   Procedure Laterality Date     COLONOSCOPY      INCISION AND DRAINAGE OF WOUND Left 2022    Procedure: INCISION AND DRAINAGE (I&D) EXTREMITY;  Surgeon: Moustapha Cote DPM;  Location: [facility] MAIN OR;  Service: Podiatry    IR BIOPSY SPINE  2024    IR BIOPSY SPINE  2024    IR PICC PLACEMENT SINGLE LUMEN  2024    JOINT REPLACEMENT Left 2022    Left TSA    MT ARTHRODESIS POSTERIOR/PSTLAT TQ 1NTRSPC LUMBAR Bilateral 2023    Procedure: L1-S1 navigated posterior decompression with instrumented fixation fusion;  Surgeon: James Moraes MD;  Location: [facility] MAIN OR;  Service: Neurosurgery    THYROID SURGERY      remove cancer     Family History   Problem Relation Age of Onset    Hypertension Father     Dementia Mother     Diabetes Mother     Colon cancer Neg Hx      Social History     Socioeconomic History    Marital status: /Civil Union     Spouse name: None    Number of children: None    Years of education: None    Highest education level: None   Occupational History    None   Tobacco Use    Smoking status: Former     Current packs/day: 0.00     Average packs/day: 0.3 packs/day for 5.9 years (1.5 ttl pk-yrs)     Types: Cigarettes     Start date: 1975     Quit date: 1981     Years since quittin.7    Smokeless tobacco: Never    Tobacco comments:     quit ; smokes when in pain as of 26   Vaping Use    Vaping status: Former    Substances: THC, CBD   Substance and Sexual Activity    Alcohol use: Not Currently     Alcohol/week: 4.0 - 7.0 standard drinks of alcohol     Types: 1 - 2 Glasses of wine, 2 - 3 Cans of beer, 1 - 2 Shots of liquor per week     Comment: only on special occasions    Drug use: Not Currently     Frequency: 7.0 times per week     Types: Marijuana     Comment: Medical Marijuana    Sexual activity: Yes     Partners: Female     Birth control/protection: None   Other Topics Concern    None   Social History Narrative    None     Social Drivers of Health     Financial Resource Strain: Not  on file   Food Insecurity: Patient Unable To Answer (2025)    Nursing - Inadequate Food Risk Classification     Worried About Running Out of Food in the Last Year: Never true     Ran Out of Food in the Last Year: Never true     Ran Out of Food in the Last Year: Patient unable to answer   Transportation Needs: Patient Unable To Answer (2025)    Nursing - Transportation Risk Classification     Lack of Transportation: Not on file     Lack of Transportation: Patient unable to answer   Physical Activity: Not on file   Stress: Not on file   Social Connections: Not on file   Intimate Partner Violence: Patient Unable To Answer (2025)    Nursing IPS     Feels Physically and Emotionally Safe: Not on file     Physically Hurt by Someone: Not on file     Humiliated or Emotionally Abused by Someone: Not on file     Physically Hurt by Someone: Patient unable to answer     Hurt or Threatened by Someone: Patient unable to answer   Housing Stability: Patient Unable To Answer (2025)    Nursing: Inadequate Housing Risk Classification     Has Housing: Not on file     Worried About Losing Housing: Not on file     Unable to Get Utilities: Not on file     Unable to Pay for Housing in the Last Year: Patient unable to answer     Has Housin       Allergies:  Allergies   Allergen Reactions    Abilify [Aripiprazole] Tremor     Shaking      Cephalexin Diarrhea    Molds & Smuts Allergic Rhinitis     Other Reaction(s): Allergic Rhinitis      Red itchy eye, congestion    Pregabalin Tremor     Lyrica - shaking feeling       Review of Systems     Review of Systems   Constitutional:  Negative for activity change, appetite change, chills, diaphoresis, fatigue and unexpected weight change.   HENT:  Negative for congestion, ear discharge, ear pain, hearing loss, nosebleeds and rhinorrhea.    Eyes:  Negative for pain, redness, itching and visual disturbance.   Respiratory:  Negative for cough, choking, chest tightness and  shortness of breath.    Cardiovascular:  Negative for chest pain and leg swelling.   Gastrointestinal:  Positive for abdominal pain and nausea. Negative for blood in stool, constipation and diarrhea.   Endocrine: Negative for cold intolerance, polydipsia and polyphagia.   Genitourinary:  Negative for dysuria, frequency, hematuria and urgency.   Musculoskeletal:  Positive for back pain and gait problem. Negative for arthralgias, joint swelling, neck pain and neck stiffness.   Skin:  Negative for color change and rash.   Allergic/Immunologic: Negative for environmental allergies and food allergies.   Neurological:  Positive for weakness. Negative for dizziness, tremors, seizures, speech difficulty, numbness and headaches.   Hematological:  Negative for adenopathy. Does not bruise/bleed easily.   Psychiatric/Behavioral:  Positive for dysphoric mood. Negative for behavioral problems, hallucinations and self-injury.        Medications and orders     All medications reviewed and updated in long term EMR.      Objective     Vitals: per nursing home record    Physical Exam  Constitutional:       General: He is not in acute distress.     Appearance: He is well-developed. He is not diaphoretic.   HENT:      Head: Normocephalic and atraumatic.      Right Ear: External ear normal.      Left Ear: External ear normal.      Nose: Nose normal.      Mouth/Throat:      Pharynx: No oropharyngeal exudate.   Eyes:      General: No scleral icterus.        Right eye: No discharge.         Left eye: No discharge.      Conjunctiva/sclera: Conjunctivae normal.      Pupils: Pupils are equal, round, and reactive to light.   Neck:      Thyroid: No thyromegaly.   Cardiovascular:      Rate and Rhythm: Normal rate and regular rhythm.      Heart sounds: Normal heart sounds. No murmur heard.  Pulmonary:      Effort: Pulmonary effort is normal.      Breath sounds: No wheezing or rales.      Comments: Diminished breath sounds  Abdominal:       General: Bowel sounds are normal.      Palpations: Abdomen is soft. There is no mass.      Tenderness: There is abdominal tenderness. There is no guarding.   Musculoskeletal:         General: Tenderness present. Normal range of motion.      Cervical back: Normal range of motion and neck supple.   Lymphadenopathy:      Cervical: No cervical adenopathy.   Skin:     General: Skin is warm and dry.   Neurological:      Mental Status: He is alert and oriented to person, place, and time.      Motor: Weakness present.      Deep Tendon Reflexes: Reflexes are normal and symmetric.   Psychiatric:         Thought Content: Thought content normal.         Judgment: Judgment normal.         Pertinent Laboratory/Diagnostic Studies:   The following labs/studies were reviewed please see chart or hospital paperwork for details.  Diagnostic studies from the hospital were reviewed    - Admit for PT OT medical therapy.  We will monitor his labs.  He will continue with Mobic omeprazole.  Continue to use tramadol for pain relief.  Encourage fluid intake.    Moustapha Wallace,   2/21/2025 4:15 PM

## 2025-02-24 ENCOUNTER — NURSING HOME VISIT (OUTPATIENT)
Dept: FAMILY MEDICINE CLINIC | Facility: CLINIC | Age: 70
End: 2025-02-24
Payer: COMMERCIAL

## 2025-02-24 ENCOUNTER — PATIENT OUTREACH (OUTPATIENT)
Dept: CASE MANAGEMENT | Facility: OTHER | Age: 70
End: 2025-02-24

## 2025-02-24 DIAGNOSIS — M54.16 LUMBAR RADICULOPATHY: ICD-10-CM

## 2025-02-24 DIAGNOSIS — E11.65 TYPE 2 DIABETES MELLITUS WITH HYPERGLYCEMIA, WITHOUT LONG-TERM CURRENT USE OF INSULIN (HCC): ICD-10-CM

## 2025-02-24 DIAGNOSIS — Z98.1 STATUS POST LUMBAR SPINAL FUSION: ICD-10-CM

## 2025-02-24 DIAGNOSIS — K20.90 ESOPHAGITIS: Primary | ICD-10-CM

## 2025-02-24 PROCEDURE — 99308 SBSQ NF CARE LOW MDM 20: CPT | Performed by: FAMILY MEDICINE

## 2025-02-24 NOTE — PROGRESS NOTES
Outpatient care management referral via HRR report 2/21/25. Discharged 2/20/25 to [facility]. Email sent to facility to inform them I will be following the patient during their skilled stay.  This Admin Coordinator will continue to monitor via chart review.

## 2025-02-24 NOTE — PROGRESS NOTES
[facility]  [address] [place] [address]  Facility: [facility]    NAME: Juan San  AGE: 69 y.o. SEX: male    DATE OF ENCOUNTER: 2/24/2025    Code status:  DNR w/ Hospitalization    Assessment and Plan     1. Esophagitis  2. Type 2 diabetes mellitus with hyperglycemia, without long-term current use of insulin (HCC)  3. Lumbar radiculopathy  4. Status post lumbar spinal fusion      All medications and routine orders were reviewed and updated as needed.    Plan discussed with: Patient    Chief Complaint     Interim evaluation    History of Present Illness     Patient is seen for interim evaluation.  He reports frequent loose bowel movements as a result of his chronic antibiotic use.  His pain is insufficiently controlled.  He is expecting to have to go to the [facility] for further care for his back    The following portions of the patient's history were reviewed and updated as appropriate: current medications, past family history, past medical history, past social history, past surgical history and problem list.    Allergies:  Allergies   Allergen Reactions    Abilify [Aripiprazole] Tremor     Shaking      Cephalexin Diarrhea    Molds & Smuts Allergic Rhinitis     Other Reaction(s): Allergic Rhinitis      Red itchy eye, congestion    Pregabalin Tremor     Lyrica - shaking feeling       Review of Systems     Review of Systems   Constitutional:  Negative for activity change, appetite change, chills, diaphoresis, fatigue and unexpected weight change.   HENT:  Negative for congestion, ear discharge, ear pain, hearing loss, nosebleeds and rhinorrhea.    Eyes:  Negative for pain, redness, itching and visual disturbance.   Respiratory:  Negative for cough, choking, chest tightness and shortness of breath.    Cardiovascular:  Negative for chest pain and leg swelling.   Gastrointestinal:  Positive for diarrhea. Negative for abdominal pain, blood in stool, constipation  and nausea.   Endocrine: Negative for cold intolerance, polydipsia and polyphagia.   Genitourinary:  Negative for dysuria, frequency, hematuria and urgency.   Musculoskeletal:  Positive for back pain and gait problem. Negative for arthralgias, joint swelling, neck pain and neck stiffness.   Skin:  Negative for color change and rash.   Allergic/Immunologic: Negative for environmental allergies and food allergies.   Neurological:  Positive for weakness. Negative for dizziness, tremors, seizures, speech difficulty, numbness and headaches.   Hematological:  Does not bruise/bleed easily.   Psychiatric/Behavioral:  Negative for behavioral problems, dysphoric mood, hallucinations and self-injury.        Medications and orders     All medications reviewed and updated in longterm EMR.      Objective     Vitals: per nursing home records    Physical Exam  Constitutional:       General: He is not in acute distress.     Appearance: He is well-developed. He is not diaphoretic.   HENT:      Head: Normocephalic and atraumatic.      Right Ear: External ear normal.      Left Ear: External ear normal.      Nose: Nose normal.      Mouth/Throat:      Pharynx: No oropharyngeal exudate.   Eyes:      General: No scleral icterus.        Right eye: No discharge.         Left eye: No discharge.      Conjunctiva/sclera: Conjunctivae normal.      Pupils: Pupils are equal, round, and reactive to light.   Neck:      Thyroid: No thyromegaly.   Cardiovascular:      Rate and Rhythm: Normal rate and regular rhythm.      Heart sounds: Normal heart sounds. No murmur heard.  Pulmonary:      Effort: Pulmonary effort is normal.      Breath sounds: Normal breath sounds. No wheezing or rales.   Abdominal:      General: Bowel sounds are normal.      Palpations: Abdomen is soft. There is no mass.      Tenderness: There is no abdominal tenderness. There is no guarding.   Musculoskeletal:         General: Tenderness present. Normal range of motion.       Cervical back: Normal range of motion and neck supple.   Lymphadenopathy:      Cervical: No cervical adenopathy.   Skin:     General: Skin is warm and dry.   Neurological:      Mental Status: He is alert and oriented to person, place, and time.      Motor: Weakness present.      Deep Tendon Reflexes: Reflexes are normal and symmetric.   Psychiatric:         Thought Content: Thought content normal.         Judgment: Judgment normal.         Pertinent Laboratory/Diagnostic Studies:     The following studies were reviewed please see chart or hospital paperwork for details.    Space for lab dictation no new diagnostics    - Add Florastor.  Increase tramadol to 50 mg every 6 hours around-the-clock    Moustapha Wallace DO  2/24/2025 1:19 PM

## 2025-02-25 ENCOUNTER — RESULTS FOLLOW-UP (OUTPATIENT)
Dept: GASTROENTEROLOGY | Facility: CLINIC | Age: 70
End: 2025-02-25

## 2025-02-25 PROCEDURE — 88341 IMHCHEM/IMCYTCHM EA ADD ANTB: CPT | Performed by: PATHOLOGY

## 2025-02-25 PROCEDURE — 88312 SPECIAL STAINS GROUP 1: CPT | Performed by: PATHOLOGY

## 2025-02-25 PROCEDURE — 88305 TISSUE EXAM BY PATHOLOGIST: CPT | Performed by: PATHOLOGY

## 2025-02-25 PROCEDURE — 88342 IMHCHEM/IMCYTCHM 1ST ANTB: CPT | Performed by: PATHOLOGY

## 2025-02-26 DIAGNOSIS — F31.81 BIPOLAR II DISORDER (HCC): ICD-10-CM

## 2025-02-27 ENCOUNTER — NURSING HOME VISIT (OUTPATIENT)
Dept: FAMILY MEDICINE CLINIC | Facility: CLINIC | Age: 70
End: 2025-02-27
Payer: COMMERCIAL

## 2025-02-27 ENCOUNTER — PATIENT OUTREACH (OUTPATIENT)
Dept: CASE MANAGEMENT | Facility: OTHER | Age: 70
End: 2025-02-27

## 2025-02-27 DIAGNOSIS — M54.41 ACUTE BILATERAL LOW BACK PAIN WITH BILATERAL SCIATICA: ICD-10-CM

## 2025-02-27 DIAGNOSIS — N17.9 ACUTE KIDNEY INJURY (HCC): ICD-10-CM

## 2025-02-27 DIAGNOSIS — M54.42 ACUTE BILATERAL LOW BACK PAIN WITH BILATERAL SCIATICA: ICD-10-CM

## 2025-02-27 DIAGNOSIS — F10.21 ALCOHOLISM IN REMISSION (HCC): ICD-10-CM

## 2025-02-27 DIAGNOSIS — T84.9XXD FAILURE OF JOINT FUSION, SUBSEQUENT ENCOUNTER: ICD-10-CM

## 2025-02-27 DIAGNOSIS — K20.90 ESOPHAGITIS: Primary | ICD-10-CM

## 2025-02-27 PROCEDURE — 99308 SBSQ NF CARE LOW MDM 20: CPT | Performed by: FAMILY MEDICINE

## 2025-02-27 RX ORDER — MIRTAZAPINE 45 MG/1
45 TABLET, FILM COATED ORAL
Qty: 30 TABLET | Refills: 5 | Status: SHIPPED | OUTPATIENT
Start: 2025-02-27

## 2025-02-27 NOTE — PROGRESS NOTES
[facility]  [address] [place] [address]  Facility: [facility]    NAME: Juan San  AGE: 69 y.o. SEX: male    DATE OF ENCOUNTER: 2/27/2025    Code status:  Full Code    Assessment and Plan     1. Esophagitis  2. Acute bilateral low back pain with bilateral sciatica  3. Failure of joint fusion, subsequent encounter  4. Acute kidney injury (HCC)  5. Alcoholism in remission (HCC)      All medications and routine orders were reviewed and updated as needed.    Plan discussed with: Patient    Chief Complaint     Interim evaluation    History of Present Illness     Patient is seen for interim evaluation.  He is ambulating well with his walker.  His pain is not sufficiently controlled however.  He is not having any nausea or heartburn.  Bowels are stable.  He denies dyspnea.    The following portions of the patient's history were reviewed and updated as appropriate: current medications, past family history, past medical history, past social history, past surgical history and problem list.    Allergies:  Allergies   Allergen Reactions    Abilify [Aripiprazole] Tremor     Shaking      Cephalexin Diarrhea    Molds & Smuts Allergic Rhinitis     Other Reaction(s): Allergic Rhinitis      Red itchy eye, congestion    Pregabalin Tremor     Lyrica - shaking feeling       Review of Systems     Review of Systems   Constitutional:  Negative for activity change, appetite change, chills, diaphoresis, fatigue and unexpected weight change.   HENT:  Negative for congestion, ear discharge, ear pain, hearing loss, nosebleeds and rhinorrhea.    Eyes:  Negative for pain, redness, itching and visual disturbance.   Respiratory:  Negative for cough, choking, chest tightness and shortness of breath.    Cardiovascular:  Negative for chest pain and leg swelling.   Gastrointestinal:  Negative for abdominal pain, blood in stool, constipation, diarrhea and nausea.   Endocrine: Negative for cold intolerance,  polydipsia and polyphagia.   Genitourinary:  Negative for dysuria, frequency, hematuria and urgency.   Musculoskeletal:  Positive for back pain and gait problem. Negative for arthralgias, joint swelling, neck pain and neck stiffness.   Skin:  Negative for color change and rash.   Allergic/Immunologic: Negative for environmental allergies and food allergies.   Neurological:  Positive for weakness. Negative for dizziness, tremors, seizures, speech difficulty, numbness and headaches.   Hematological:  Negative for adenopathy. Does not bruise/bleed easily.   Psychiatric/Behavioral:  Negative for behavioral problems, dysphoric mood, hallucinations and self-injury.        Medications and orders     All medications reviewed and updated in halfway EMR.      Objective     Vitals: per nursing home records    Physical Exam  Constitutional:       General: He is not in acute distress.     Appearance: He is well-developed. He is not diaphoretic.   HENT:      Head: Normocephalic and atraumatic.      Right Ear: External ear normal.      Left Ear: External ear normal.      Nose: Nose normal.      Mouth/Throat:      Pharynx: No oropharyngeal exudate.   Eyes:      General: No scleral icterus.        Right eye: No discharge.         Left eye: No discharge.      Conjunctiva/sclera: Conjunctivae normal.      Pupils: Pupils are equal, round, and reactive to light.   Neck:      Thyroid: No thyromegaly.   Cardiovascular:      Rate and Rhythm: Normal rate. Rhythm irregular.      Heart sounds: Normal heart sounds. No murmur heard.  Pulmonary:      Effort: Pulmonary effort is normal.      Breath sounds: Normal breath sounds. No wheezing or rales.   Abdominal:      General: Bowel sounds are normal.      Palpations: Abdomen is soft. There is no mass.      Tenderness: There is no abdominal tenderness. There is no guarding.   Musculoskeletal:         General: Tenderness present. Normal range of motion.      Cervical back: Normal range of  motion and neck supple.   Lymphadenopathy:      Cervical: No cervical adenopathy.   Skin:     General: Skin is warm and dry.   Neurological:      Mental Status: He is alert and oriented to person, place, and time.      Motor: Weakness present.      Deep Tendon Reflexes: Reflexes are normal and symmetric.   Psychiatric:         Thought Content: Thought content normal.         Judgment: Judgment normal.         Pertinent Laboratory/Diagnostic Studies:     The following studies were reviewed please see chart or hospital paperwork for details.    Space for lab dictation labs are stable    - Continue the current regimen.  Consider increasing tramadol to 100 mg every 6 hours    Moustapha Wallace DO  2/27/2025 12:44 PM

## 2025-03-03 ENCOUNTER — NURSING HOME VISIT (OUTPATIENT)
Dept: FAMILY MEDICINE CLINIC | Facility: CLINIC | Age: 70
End: 2025-03-03
Payer: COMMERCIAL

## 2025-03-03 DIAGNOSIS — E11.42 DIABETIC PERIPHERAL NEUROPATHY (HCC): ICD-10-CM

## 2025-03-03 DIAGNOSIS — D50.9 IRON DEFICIENCY ANEMIA, UNSPECIFIED IRON DEFICIENCY ANEMIA TYPE: ICD-10-CM

## 2025-03-03 DIAGNOSIS — M54.42 ACUTE BILATERAL LOW BACK PAIN WITH BILATERAL SCIATICA: Primary | ICD-10-CM

## 2025-03-03 DIAGNOSIS — T84.9XXD FAILURE OF JOINT FUSION, SUBSEQUENT ENCOUNTER: ICD-10-CM

## 2025-03-03 DIAGNOSIS — E11.65 TYPE 2 DIABETES MELLITUS WITH HYPERGLYCEMIA, WITHOUT LONG-TERM CURRENT USE OF INSULIN (HCC): ICD-10-CM

## 2025-03-03 DIAGNOSIS — M54.41 ACUTE BILATERAL LOW BACK PAIN WITH BILATERAL SCIATICA: Primary | ICD-10-CM

## 2025-03-03 PROCEDURE — 99308 SBSQ NF CARE LOW MDM 20: CPT | Performed by: FAMILY MEDICINE

## 2025-03-03 NOTE — PROGRESS NOTES
[facility]  [address] [place] [address]  Facility: [facility]    NAME: Juan San  AGE: 69 y.o. SEX: male    DATE OF ENCOUNTER: 3/3/2025    Code status:  DNR w/ Hospitalization    Assessment and Plan     1. Acute bilateral low back pain with bilateral sciatica  2. Failure of joint fusion, subsequent encounter  3. Type 2 diabetes mellitus with hyperglycemia, without long-term current use of insulin (HCC)  4. Iron deficiency anemia, unspecified iron deficiency anemia type  5. Diabetic peripheral neuropathy (HCC)      All medications and routine orders were reviewed and updated as needed.    Plan discussed with: Patient    Chief Complaint     Interim evaluation    History of Present Illness     Is seen for interim evaluation.  We increased his tramadol to 100 mg every 6 hours.  This resulted in improved pain relief.  Continues to have some swelling in his bilateral lower extremities.  Also suffers from neuropathy.  He is moving fairly well with therapy.    The following portions of the patient's history were reviewed and updated as appropriate: current medications, past family history, past medical history, past social history, past surgical history and problem list.    Allergies:  Allergies   Allergen Reactions    Abilify [Aripiprazole] Tremor     Shaking      Cephalexin Diarrhea    Molds & Smuts Allergic Rhinitis     Other Reaction(s): Allergic Rhinitis      Red itchy eye, congestion    Pregabalin Tremor     Lyrica - shaking feeling       Review of Systems     Review of Systems   Constitutional:  Negative for activity change, appetite change, chills, diaphoresis, fatigue and unexpected weight change.   HENT:  Negative for congestion, ear discharge, ear pain, hearing loss, nosebleeds and rhinorrhea.    Eyes:  Negative for pain, redness, itching and visual disturbance.   Respiratory:  Negative for cough, choking, chest tightness and shortness of breath.    Cardiovascular:   Positive for leg swelling. Negative for chest pain.   Gastrointestinal:  Negative for abdominal pain, blood in stool, constipation, diarrhea and nausea.   Endocrine: Negative for cold intolerance, polydipsia and polyphagia.   Genitourinary:  Negative for dysuria, frequency, hematuria and urgency.   Musculoskeletal:  Positive for back pain and gait problem. Negative for arthralgias, joint swelling, neck pain and neck stiffness.   Skin:  Negative for color change and rash.   Allergic/Immunologic: Negative for environmental allergies and food allergies.   Neurological:  Positive for tremors and weakness. Negative for dizziness, seizures, speech difficulty, numbness and headaches.   Hematological:  Negative for adenopathy. Does not bruise/bleed easily.   Psychiatric/Behavioral:  Negative for behavioral problems, dysphoric mood, hallucinations and self-injury.        Medications and orders     All medications reviewed and updated in shelter EMR.      Objective     Vitals: per nursing home records    Physical Exam  Constitutional:       General: He is not in acute distress.     Appearance: He is well-developed. He is not diaphoretic.   HENT:      Head: Normocephalic and atraumatic.      Right Ear: External ear normal.      Left Ear: External ear normal.      Nose: Nose normal.      Mouth/Throat:      Pharynx: No oropharyngeal exudate.   Eyes:      General: No scleral icterus.        Right eye: No discharge.         Left eye: No discharge.      Conjunctiva/sclera: Conjunctivae normal.      Pupils: Pupils are equal, round, and reactive to light.   Neck:      Thyroid: No thyromegaly.   Cardiovascular:      Rate and Rhythm: Normal rate and regular rhythm.      Heart sounds: Normal heart sounds. No murmur heard.  Pulmonary:      Effort: Pulmonary effort is normal.      Breath sounds: Normal breath sounds. No wheezing or rales.   Abdominal:      General: Bowel sounds are normal.      Palpations: Abdomen is soft. There is  no mass.      Tenderness: There is no abdominal tenderness. There is no guarding.   Musculoskeletal:         General: Tenderness present. Normal range of motion.      Cervical back: Normal range of motion and neck supple.      Right lower leg: Edema present.      Left lower leg: Edema present.   Lymphadenopathy:      Cervical: No cervical adenopathy.   Skin:     General: Skin is warm and dry.   Neurological:      Mental Status: He is alert and oriented to person, place, and time.      Motor: Weakness present.      Deep Tendon Reflexes: Reflexes are normal and symmetric.   Psychiatric:         Thought Content: Thought content normal.         Judgment: Judgment normal.         Pertinent Laboratory/Diagnostic Studies:     The following studies were reviewed please see chart or hospital paperwork for details.    Space for lab dictation no new diagnostics    - Add Tubigrip's.    Moustapha Wallace,   3/3/2025 1:42 PM

## 2025-03-06 ENCOUNTER — TELEPHONE (OUTPATIENT)
Dept: GASTROENTEROLOGY | Facility: CLINIC | Age: 70
End: 2025-03-06

## 2025-03-06 ENCOUNTER — NURSING HOME VISIT (OUTPATIENT)
Dept: FAMILY MEDICINE CLINIC | Facility: CLINIC | Age: 70
End: 2025-03-06
Payer: COMMERCIAL

## 2025-03-06 ENCOUNTER — PATIENT OUTREACH (OUTPATIENT)
Dept: CASE MANAGEMENT | Facility: OTHER | Age: 70
End: 2025-03-06

## 2025-03-06 DIAGNOSIS — K20.90 ESOPHAGITIS: Primary | ICD-10-CM

## 2025-03-06 DIAGNOSIS — M54.42 ACUTE BILATERAL LOW BACK PAIN WITH BILATERAL SCIATICA: ICD-10-CM

## 2025-03-06 DIAGNOSIS — M54.41 ACUTE BILATERAL LOW BACK PAIN WITH BILATERAL SCIATICA: ICD-10-CM

## 2025-03-06 DIAGNOSIS — E11.42 DIABETIC PERIPHERAL NEUROPATHY (HCC): ICD-10-CM

## 2025-03-06 DIAGNOSIS — E11.65 TYPE 2 DIABETES MELLITUS WITH HYPERGLYCEMIA, WITHOUT LONG-TERM CURRENT USE OF INSULIN (HCC): ICD-10-CM

## 2025-03-06 PROCEDURE — 99315 NF DSCHRG MGMT 30 MIN/LESS: CPT | Performed by: FAMILY MEDICINE

## 2025-03-06 NOTE — TELEPHONE ENCOUNTER
----- Message from RUIZ Madera sent at 3/6/2025  2:59 PM EST -----  Regarding: schedule appt  Good afternoon,    Can we please contact this patient and have him scheduled for a follow-up hospitalization office visit with Dr. Brynat.  He was recently discharged from [facility].  Thank you.  Norman

## 2025-03-06 NOTE — PROGRESS NOTES
[facility]  [address] [place] [address]  Facility: [facility]    NAME: Juan San  AGE: 69 y.o. SEX: male    DATE OF ENCOUNTER: 3/6/2025    Code status:  DNR w/ Hospitalization    Assessment and Plan     1. Esophagitis  2. Diabetic peripheral neuropathy (HCC)  3. Type 2 diabetes mellitus with hyperglycemia, without long-term current use of insulin (HCC)  4. Acute bilateral low back pain with bilateral sciatica      All medications and routine orders were reviewed and updated as needed.    Plan discussed with: Family member    Chief Complaint     Interim evaluation    History of Present Illness     Patient is seen for interim evaluation.  He will be discharged to home over the weekend.  He will need follow-up with pain management.  He is currently taking tramadol 2 tablets every 6 hours.  This is not completely controlling his pain.  His esophagitis symptoms have dramatically improved.  Bowel habits are stable.  He has no dysuria    The following portions of the patient's history were reviewed and updated as appropriate: current medications, past family history, past medical history, past social history, past surgical history and problem list.    Allergies:  Allergies   Allergen Reactions    Abilify [Aripiprazole] Tremor     Shaking      Cephalexin Diarrhea    Molds & Smuts Allergic Rhinitis     Other Reaction(s): Allergic Rhinitis      Red itchy eye, congestion    Pregabalin Tremor     Lyrica - shaking feeling       Review of Systems     Review of Systems   Constitutional:  Negative for activity change, appetite change, chills, diaphoresis, fatigue and unexpected weight change.   HENT:  Negative for congestion, ear discharge, ear pain, hearing loss, nosebleeds and rhinorrhea.    Eyes:  Negative for pain, redness, itching and visual disturbance.   Respiratory:  Negative for cough, choking, chest tightness and shortness of breath.    Cardiovascular:  Negative for chest pain  and leg swelling.   Gastrointestinal:  Negative for abdominal pain, blood in stool, constipation, diarrhea and nausea.   Endocrine: Negative for cold intolerance, polydipsia and polyphagia.   Genitourinary:  Negative for dysuria, frequency, hematuria and urgency.   Musculoskeletal:  Positive for back pain. Negative for arthralgias, gait problem, joint swelling, neck pain and neck stiffness.   Skin:  Negative for color change and rash.   Allergic/Immunologic: Negative for environmental allergies and food allergies.   Neurological:  Positive for weakness. Negative for dizziness, tremors, seizures, speech difficulty, numbness and headaches.   Hematological:  Negative for adenopathy. Does not bruise/bleed easily.   Psychiatric/Behavioral:  Negative for behavioral problems, dysphoric mood, hallucinations and self-injury.        Medications and orders     All medications reviewed and updated in group home EMR.      Objective     Vitals: per nursing home records    Physical Exam  Constitutional:       General: He is not in acute distress.     Appearance: He is well-developed. He is not diaphoretic.   HENT:      Head: Normocephalic and atraumatic.      Right Ear: External ear normal.      Left Ear: External ear normal.      Nose: Nose normal.      Mouth/Throat:      Pharynx: No oropharyngeal exudate.   Eyes:      General: No scleral icterus.        Right eye: No discharge.         Left eye: No discharge.      Conjunctiva/sclera: Conjunctivae normal.      Pupils: Pupils are equal, round, and reactive to light.   Neck:      Thyroid: No thyromegaly.   Cardiovascular:      Rate and Rhythm: Normal rate and regular rhythm.      Heart sounds: Normal heart sounds. No murmur heard.  Pulmonary:      Effort: Pulmonary effort is normal.      Breath sounds: Normal breath sounds. No wheezing or rales.   Abdominal:      General: Bowel sounds are normal.      Palpations: Abdomen is soft. There is no mass.      Tenderness: There is no  abdominal tenderness. There is no guarding.   Musculoskeletal:         General: Tenderness present. Normal range of motion.      Cervical back: Normal range of motion and neck supple.   Lymphadenopathy:      Cervical: No cervical adenopathy.   Skin:     General: Skin is warm and dry.   Neurological:      Mental Status: He is alert and oriented to person, place, and time.      Sensory: Sensory deficit present.      Motor: Weakness present.      Deep Tendon Reflexes: Reflexes are normal and symmetric.   Psychiatric:         Thought Content: Thought content normal.         Judgment: Judgment normal.         Pertinent Laboratory/Diagnostic Studies:     The following studies were reviewed please see chart or hospital paperwork for details.    Space for lab dictation sugars have been low    - Discharge planning.  We will decrease metformin to once daily.  Increase gabapentin to every 6 hours    Moustapha Wallace DO  3/6/2025 2:25 PM

## 2025-03-06 NOTE — PROGRESS NOTES
Chart review completed.  Email sent to facility requesting update on patient.   This care manager assistant will continue to monitor via chart review throughout SNF/STR Surveillance episode.    Update received the patient has a LCD of 2/7/25.

## 2025-03-06 NOTE — PROGRESS NOTES
Wheelchair ordered via [facility] for pt ambulatory dysfunction upon arriving home from Nursing Home.

## 2025-03-07 LAB

## 2025-03-10 ENCOUNTER — PATIENT OUTREACH (OUTPATIENT)
Dept: CASE MANAGEMENT | Facility: OTHER | Age: 70
End: 2025-03-10

## 2025-03-10 ENCOUNTER — PATIENT OUTREACH (OUTPATIENT)
Dept: CASE MANAGEMENT | Facility: HOSPITAL | Age: 70
End: 2025-03-10

## 2025-03-10 DIAGNOSIS — T84.9XXD FAILURE OF JOINT FUSION, SUBSEQUENT ENCOUNTER: ICD-10-CM

## 2025-03-10 DIAGNOSIS — M54.41 ACUTE BILATERAL LOW BACK PAIN WITH BILATERAL SCIATICA: ICD-10-CM

## 2025-03-10 DIAGNOSIS — G89.4 CHRONIC PAIN SYNDROME: ICD-10-CM

## 2025-03-10 DIAGNOSIS — R26.2 AMBULATORY DYSFUNCTION: Primary | ICD-10-CM

## 2025-03-10 DIAGNOSIS — Z98.1 STATUS POST LUMBAR SPINAL FUSION: ICD-10-CM

## 2025-03-10 DIAGNOSIS — M54.42 ACUTE BILATERAL LOW BACK PAIN WITH BILATERAL SCIATICA: ICD-10-CM

## 2025-03-10 DIAGNOSIS — M46.47 DISCITIS OF LUMBOSACRAL REGION: ICD-10-CM

## 2025-03-10 DIAGNOSIS — M47.816 LUMBAR SPONDYLOSIS: ICD-10-CM

## 2025-03-10 DIAGNOSIS — M54.16 LUMBAR RADICULOPATHY: ICD-10-CM

## 2025-03-10 DIAGNOSIS — M48.061 FORAMINAL STENOSIS OF LUMBAR REGION: ICD-10-CM

## 2025-03-10 NOTE — PROGRESS NOTES
Pt d/c'd from [facility] on 3/8. Request for PT/OT received. Referrals to PT/OT placed. Upcoming surgery with Sergey on 3/21.

## 2025-03-10 NOTE — PROGRESS NOTES
Update obtained from [facility] the patient discharged 3/8/25. I updated the care coordination note, removed myself as the responsible staff, added the appropriate responsible staff.  A email was sent to the facility requesting discharge instructions. When Admin Coordinator has received the Discharge paperwork  Admin Coordinator will attach to this encounter.

## 2025-03-10 NOTE — PROGRESS NOTES
"HRR ref received via report. Pt admitted to [facility] - for LIGIA, then to [facility] -3/8 then dc'd to home.  Chart review performed.    PMH: LIGIA, Esophagitis, Lumbar Spondylosis, HTN, FOUZIA, DM    Outreach call to pt, confirmed , introduced self and described CM services.  Pt's wife answered, number defaulted to her. Tatyana was at work when answered, states pt is doing \"ok\" at moment. Pt has complete weakness in left leg from hip down, multiple falls in nursing home. Pt is set up at home with things he needs to not have to move much, has phone, smart watch, urinal next to him and uses rolling walker when up. Per Tatyana pt has no CP, CT, SOB or swelling, she could not review meds at this time. Pt's wife manages all of pt's healthcare, has medications out for him to take when she is at work. Pt is scheduled for extensive back surgery, anterior placement of cage in pelvis, posterior surgery replacing L1 L2 and old rods and screws, at [facility] on 3/21 then rehab immed after. Pt in need of in home PT/OT until surgery, will message PCP for order. Pt does have upcoming apt on 3/12, wife will discuss then as well.   Pt's wife has no other needs at this time, is aware she can request a CM referral through PCP at any time for future needs.        F/U Apts:  PCP 3/12  Endo 3/18  Back Surgery 3/21/25 [facility]  "

## 2025-03-12 ENCOUNTER — TELEPHONE (OUTPATIENT)
Age: 70
End: 2025-03-12

## 2025-03-12 NOTE — TELEPHONE ENCOUNTER
Patient's wife calling in with update. Patient admitted to hospital and they will keep him until his surgery.    Please call Tatyana with any questions or concerns. Thank you!

## 2025-03-27 ENCOUNTER — NURSING HOME VISIT (OUTPATIENT)
Dept: FAMILY MEDICINE CLINIC | Facility: CLINIC | Age: 70
End: 2025-03-27
Payer: COMMERCIAL

## 2025-03-27 DIAGNOSIS — M54.41 ACUTE BILATERAL LOW BACK PAIN WITH BILATERAL SCIATICA: ICD-10-CM

## 2025-03-27 DIAGNOSIS — K70.30 ALCOHOLIC CIRRHOSIS, UNSPECIFIED WHETHER ASCITES PRESENT (HCC): ICD-10-CM

## 2025-03-27 DIAGNOSIS — I10 PRIMARY HYPERTENSION: ICD-10-CM

## 2025-03-27 DIAGNOSIS — R26.2 AMBULATORY DYSFUNCTION: ICD-10-CM

## 2025-03-27 DIAGNOSIS — M54.42 ACUTE BILATERAL LOW BACK PAIN WITH BILATERAL SCIATICA: ICD-10-CM

## 2025-03-27 DIAGNOSIS — M47.816 LUMBAR SPONDYLOSIS: Primary | ICD-10-CM

## 2025-03-27 DIAGNOSIS — D50.9 IRON DEFICIENCY ANEMIA, UNSPECIFIED IRON DEFICIENCY ANEMIA TYPE: ICD-10-CM

## 2025-03-27 DIAGNOSIS — F10.21 ALCOHOLISM IN REMISSION (HCC): ICD-10-CM

## 2025-03-27 DIAGNOSIS — F13.20 BENZODIAZEPINE DEPENDENCE (HCC): ICD-10-CM

## 2025-03-27 DIAGNOSIS — F31.81 BIPOLAR II DISORDER (HCC): ICD-10-CM

## 2025-03-27 DIAGNOSIS — E11.65 TYPE 2 DIABETES MELLITUS WITH HYPERGLYCEMIA, WITHOUT LONG-TERM CURRENT USE OF INSULIN (HCC): ICD-10-CM

## 2025-03-27 PROCEDURE — 99306 1ST NF CARE HIGH MDM 50: CPT | Performed by: FAMILY MEDICINE

## 2025-03-27 NOTE — PROGRESS NOTES
[facility]  [address] [place] [address]  Facility: [facility]    NAME: Juan San  AGE: 69 y.o. SEX: male    DATE OF ENCOUNTER: 3/27/2025    Code status:  DNR w/ Hospitalization    Assessment and Plan     1. Lumbar spondylosis  2. Acute bilateral low back pain with bilateral sciatica  3. Primary hypertension  4. Alcoholic cirrhosis, unspecified whether ascites present (HCC)  5. Type 2 diabetes mellitus with hyperglycemia, without long-term current use of insulin (HCC)  6. Alcoholism in remission (HCC)  7. Bipolar II disorder (HCC)  8. Benzodiazepine dependence ([facility])  9. Iron deficiency anemia, unspecified iron deficiency anemia type  10. Ambulatory dysfunction      All medications and routine orders were reviewed and updated as needed.    Plan discussed with: Patient    Chief Complaint     Seen for admission at Nursing Home    History of Present Illness     69-year-old male returns after hospitalization at the [facility] for spinal surgery.  He left this facility approximately 3 weeks ago.  He returns for physical therapy after undergoing laminectomy and fusion.  Patient has a history of chronic back pain and has undergone an extensive workup.  He reports that his pain is slightly improved since having surgery.  He continues to have oxycodone available at 15 mg every 4 hours as needed in addition to gabapentin.  His bowel habits are stable.  He denies having dysuria.  There is a history of BPH and a slow urinary stream.    HISTORY:  Past Medical History:   Diagnosis Date    Allergic     Anemia     Anxiety     Arthritis     Cancer (HCC)     Chronic back pain     Depression     Diabetes mellitus (HCC)     Hypertension     Liver disease     Mitral valve prolapse     Peripheral neuropathy     Neuropathy    PONV (postoperative nausea and vomiting)     Thyroid disease      Past Surgical History:   Procedure Laterality Date    COLONOSCOPY      INCISION AND DRAINAGE OF  WOUND Left 2022    Procedure: INCISION AND DRAINAGE (I&D) EXTREMITY;  Surgeon: Moustapha Cote DPM;  Location: [facility] MAIN OR;  Service: Podiatry    IR BIOPSY SPINE  2024    IR BIOPSY SPINE  2024    IR PICC PLACEMENT SINGLE LUMEN  2024    JOINT REPLACEMENT Left 2022    Left TSA    AZ ARTHRODESIS POSTERIOR/PSTLAT TQ 1NTRSPC LUMBAR Bilateral 2023    Procedure: L1-S1 navigated posterior decompression with instrumented fixation fusion;  Surgeon: James Moraes MD;  Location: [facility] MAIN OR;  Service: Neurosurgery    THYROID SURGERY      remove cancer     Family History   Problem Relation Age of Onset    Hypertension Father     Dementia Mother     Diabetes Mother     Colon cancer Neg Hx      Social History     Socioeconomic History    Marital status: /Civil Union     Spouse name: None    Number of children: None    Years of education: None    Highest education level: None   Occupational History    None   Tobacco Use    Smoking status: Former     Current packs/day: 0.00     Average packs/day: 0.3 packs/day for 5.9 years (1.5 ttl pk-yrs)     Types: Cigarettes     Start date: 1975     Quit date: 1981     Years since quittin.8    Smokeless tobacco: Never    Tobacco comments:     quit ; smokes when in pain as of 26   Vaping Use    Vaping status: Former    Substances: THC, CBD   Substance and Sexual Activity    Alcohol use: Not Currently     Alcohol/week: 4.0 - 7.0 standard drinks of alcohol     Types: 1 - 2 Glasses of wine, 2 - 3 Cans of beer, 1 - 2 Shots of liquor per week     Comment: only on special occasions    Drug use: Not Currently     Frequency: 7.0 times per week     Types: Marijuana     Comment: Medical Marijuana    Sexual activity: Yes     Partners: Female     Birth control/protection: None   Other Topics Concern    None   Social History Narrative    None     Social Drivers of Health     Financial Resource Strain: Not on file   Food Insecurity: Patient Unable  To Answer (2025)    Nursing - Inadequate Food Risk Classification     Worried About Running Out of Food in the Last Year: Never true     Ran Out of Food in the Last Year: Never true     Ran Out of Food in the Last Year: Patient unable to answer   Transportation Needs: Patient Unable To Answer (2025)    Nursing - Transportation Risk Classification     Lack of Transportation: Not on file     Lack of Transportation: Patient unable to answer   Physical Activity: Not on file   Stress: Not on file   Social Connections: Not on file   Intimate Partner Violence: Patient Unable To Answer (2025)    Nursing IPS     Feels Physically and Emotionally Safe: Not on file     Physically Hurt by Someone: Not on file     Humiliated or Emotionally Abused by Someone: Not on file     Physically Hurt by Someone: Patient unable to answer     Hurt or Threatened by Someone: Patient unable to answer   Housing Stability: Patient Unable To Answer (2025)    Nursing: Inadequate Housing Risk Classification     Has Housing: Not on file     Worried About Losing Housing: Not on file     Unable to Get Utilities: Not on file     Unable to Pay for Housing in the Last Year: Patient unable to answer     Has Housin       Allergies:  Allergies   Allergen Reactions    Abilify [Aripiprazole] Tremor     Shaking      Cephalexin Diarrhea    Molds & Smuts Allergic Rhinitis     Other Reaction(s): Allergic Rhinitis      Red itchy eye, congestion    Pregabalin Tremor     Lyrica - shaking feeling       Review of Systems     Review of Systems   Constitutional:  Negative for activity change, appetite change, chills, diaphoresis, fatigue and unexpected weight change.   HENT:  Negative for congestion, ear discharge, ear pain, hearing loss, nosebleeds and rhinorrhea.    Eyes:  Negative for pain, redness, itching and visual disturbance.   Respiratory:  Negative for cough, choking, chest tightness and shortness of breath.    Cardiovascular:  Positive  for leg swelling.   Gastrointestinal:  Negative for abdominal pain, blood in stool, constipation, diarrhea and nausea.   Endocrine: Negative for cold intolerance, polydipsia and polyphagia.   Genitourinary:  Negative for dysuria, frequency, hematuria and urgency.   Musculoskeletal:  Positive for back pain and gait problem. Negative for arthralgias, joint swelling, neck pain and neck stiffness.   Skin:  Negative for color change and rash.   Allergic/Immunologic: Negative for environmental allergies and food allergies.   Neurological:  Positive for weakness. Negative for dizziness, tremors, seizures, speech difficulty, numbness and headaches.   Hematological:  Negative for adenopathy. Does not bruise/bleed easily.   Psychiatric/Behavioral:  Negative for behavioral problems, dysphoric mood, hallucinations and self-injury.        Medications and orders     All medications reviewed and updated in jail EMR.      Objective     Vitals: per nursing home record    Physical Exam  Constitutional:       General: He is not in acute distress.     Appearance: He is well-developed. He is not diaphoretic.   HENT:      Head: Normocephalic and atraumatic.      Right Ear: External ear normal.      Left Ear: External ear normal.      Nose: Nose normal.      Mouth/Throat:      Pharynx: No oropharyngeal exudate.   Eyes:      General: No scleral icterus.        Right eye: No discharge.         Left eye: No discharge.      Conjunctiva/sclera: Conjunctivae normal.      Pupils: Pupils are equal, round, and reactive to light.   Neck:      Thyroid: No thyromegaly.   Cardiovascular:      Rate and Rhythm: Normal rate. Rhythm irregular.      Heart sounds: Normal heart sounds. No murmur heard.  Pulmonary:      Effort: Pulmonary effort is normal.      Breath sounds: Normal breath sounds. No wheezing or rales.   Abdominal:      General: Bowel sounds are normal.      Palpations: Abdomen is soft. There is no mass.      Tenderness: There is no  abdominal tenderness. There is no guarding.   Musculoskeletal:         General: Tenderness present. Normal range of motion.      Cervical back: Normal range of motion and neck supple.      Right lower leg: Edema present.      Left lower leg: Edema present.   Lymphadenopathy:      Cervical: No cervical adenopathy.   Skin:     General: Skin is warm and dry.   Neurological:      Mental Status: He is alert and oriented to person, place, and time.      Motor: Weakness present.      Deep Tendon Reflexes: Reflexes are normal and symmetric.   Psychiatric:         Mood and Affect: Mood normal.         Behavior: Behavior normal.         Thought Content: Thought content normal.         Judgment: Judgment normal.         Pertinent Laboratory/Diagnostic Studies:   The following labs/studies were reviewed please see chart or hospital paperwork for details.  Records from [facility] were reviewed    - Readmit for PT OT and medical therapy.  We will monitor his labs.  He will continue with the current medical regimen including gabapentin 400 mg 3 times daily and as needed oxycodone.    Moustapha Wallace,   3/27/2025 12:59 PM

## 2025-03-31 ENCOUNTER — NURSING HOME VISIT (OUTPATIENT)
Dept: FAMILY MEDICINE CLINIC | Facility: CLINIC | Age: 70
End: 2025-03-31
Payer: COMMERCIAL

## 2025-03-31 DIAGNOSIS — F10.21 ALCOHOLISM IN REMISSION (HCC): ICD-10-CM

## 2025-03-31 DIAGNOSIS — M54.41 ACUTE BILATERAL LOW BACK PAIN WITH BILATERAL SCIATICA: ICD-10-CM

## 2025-03-31 DIAGNOSIS — Z98.1 STATUS POST LUMBAR SPINAL FUSION: Primary | ICD-10-CM

## 2025-03-31 DIAGNOSIS — M54.42 ACUTE BILATERAL LOW BACK PAIN WITH BILATERAL SCIATICA: ICD-10-CM

## 2025-03-31 DIAGNOSIS — R26.2 AMBULATORY DYSFUNCTION: ICD-10-CM

## 2025-03-31 DIAGNOSIS — M47.816 LUMBAR SPONDYLOSIS: ICD-10-CM

## 2025-03-31 DIAGNOSIS — R60.0 LOWER EXTREMITY EDEMA: ICD-10-CM

## 2025-03-31 PROCEDURE — 99308 SBSQ NF CARE LOW MDM 20: CPT | Performed by: FAMILY MEDICINE

## 2025-03-31 NOTE — PROGRESS NOTES
[facility]  [address] [place] [address]  Facility: [facility]    NAME: Juan San  AGE: 69 y.o. SEX: male    DATE OF ENCOUNTER: 3/31/2025    Code status:  Full Code    Assessment and Plan     1. Status post lumbar spinal fusion  2. Lumbar spondylosis  3. Acute bilateral low back pain with bilateral sciatica  4. Alcoholism in remission (HCC)  5. Ambulatory dysfunction  6. Lower extremity edema      All medications and routine orders were reviewed and updated as needed.    Plan discussed with: Patient    Chief Complaint     Interim evaluation    History of Present Illness     Patient is seen for interim evaluation.  Continues to have pain in his back and also in the anterior abdomen.  Bowel habits are stable.  He is complaining of increased swelling in his bilateral ankles.  He is attempting to keep his feet elevated.  Denies any dysuria.  Giving a good effort with therapy    The following portions of the patient's history were reviewed and updated as appropriate: current medications, past family history, past medical history, past social history, past surgical history and problem list.    Allergies:  Allergies   Allergen Reactions    Abilify [Aripiprazole] Tremor     Shaking      Cephalexin Diarrhea    Molds & Smuts Allergic Rhinitis     Other Reaction(s): Allergic Rhinitis      Red itchy eye, congestion    Pregabalin Tremor     Lyrica - shaking feeling       Review of Systems     Review of Systems   Constitutional:  Negative for activity change, appetite change, chills, diaphoresis, fatigue and unexpected weight change.   HENT:  Negative for congestion, ear discharge, ear pain, hearing loss, nosebleeds and rhinorrhea.    Eyes:  Negative for pain, redness, itching and visual disturbance.   Respiratory:  Negative for cough, choking, chest tightness and shortness of breath.    Cardiovascular:  Positive for leg swelling. Negative for chest pain.   Gastrointestinal:  Negative  for abdominal pain, blood in stool, constipation, diarrhea and nausea.   Endocrine: Negative for cold intolerance, polydipsia and polyphagia.   Genitourinary:  Negative for dysuria, frequency, hematuria and urgency.   Musculoskeletal:  Positive for back pain and gait problem. Negative for arthralgias, joint swelling, neck pain and neck stiffness.   Skin:  Negative for color change and rash.   Allergic/Immunologic: Negative for environmental allergies and food allergies.   Neurological:  Positive for weakness. Negative for dizziness, tremors, seizures, speech difficulty, numbness and headaches.   Hematological:  Negative for adenopathy. Does not bruise/bleed easily.   Psychiatric/Behavioral:  Negative for behavioral problems, dysphoric mood, hallucinations and self-injury.        Medications and orders     All medications reviewed and updated in long term EMR.      Objective     Vitals: per nursing home records    Physical Exam  Constitutional:       General: He is not in acute distress.     Appearance: He is well-developed. He is not diaphoretic.   HENT:      Head: Normocephalic and atraumatic.      Right Ear: External ear normal.      Left Ear: External ear normal.      Nose: Nose normal.      Mouth/Throat:      Pharynx: No oropharyngeal exudate.   Eyes:      General: No scleral icterus.        Right eye: No discharge.         Left eye: No discharge.      Conjunctiva/sclera: Conjunctivae normal.      Pupils: Pupils are equal, round, and reactive to light.   Neck:      Thyroid: No thyromegaly.   Cardiovascular:      Rate and Rhythm: Normal rate. Rhythm irregular.      Heart sounds: Normal heart sounds. No murmur heard.  Pulmonary:      Effort: Pulmonary effort is normal.      Breath sounds: Normal breath sounds. No wheezing or rales.   Abdominal:      General: Bowel sounds are normal.      Palpations: Abdomen is soft. There is no mass.      Tenderness: There is no abdominal tenderness. There is no guarding.    Musculoskeletal:         General: Tenderness present. Normal range of motion.      Cervical back: Normal range of motion and neck supple.   Lymphadenopathy:      Cervical: No cervical adenopathy.   Skin:     General: Skin is warm and dry.   Neurological:      Mental Status: He is alert and oriented to person, place, and time.      Motor: Weakness present.      Deep Tendon Reflexes: Reflexes are normal and symmetric.   Psychiatric:         Thought Content: Thought content normal.         Judgment: Judgment normal.         Pertinent Laboratory/Diagnostic Studies:     The following studies were reviewed please see chart or hospital paperwork for details.    Space for lab dictation no new diagnostics    - Continue the current therapy plan and medication regimen.  Follow-up with spine surgery later this week    Moustapha Wallace DO  3/31/2025 12:52 PM

## 2025-04-03 ENCOUNTER — NURSING HOME VISIT (OUTPATIENT)
Dept: FAMILY MEDICINE CLINIC | Facility: CLINIC | Age: 70
End: 2025-04-03
Payer: COMMERCIAL

## 2025-04-03 DIAGNOSIS — G89.4 CHRONIC PAIN SYNDROME: ICD-10-CM

## 2025-04-03 DIAGNOSIS — M47.816 LUMBAR SPONDYLOSIS: ICD-10-CM

## 2025-04-03 DIAGNOSIS — R26.2 AMBULATORY DYSFUNCTION: ICD-10-CM

## 2025-04-03 DIAGNOSIS — T14.8XXA WOUND INFECTION: Primary | ICD-10-CM

## 2025-04-03 DIAGNOSIS — F10.21 ALCOHOLISM IN REMISSION (HCC): ICD-10-CM

## 2025-04-03 DIAGNOSIS — L08.9 WOUND INFECTION: Primary | ICD-10-CM

## 2025-04-03 PROCEDURE — 99309 SBSQ NF CARE MODERATE MDM 30: CPT | Performed by: FAMILY MEDICINE

## 2025-04-03 NOTE — PROGRESS NOTES
[facility]  [address] [place] [address]  Facility: [facility]    NAME: Juan San  AGE: 69 y.o. SEX: male    DATE OF ENCOUNTER: 4/3/2025    Code status:  Full Code    Assessment and Plan     1. Wound infection  2. Lumbar spondylosis  3. Alcoholism in remission (HCC)  4. Ambulatory dysfunction  5. Chronic pain syndrome      All medications and routine orders were reviewed and updated as needed.    Plan discussed with: Patient    Chief Complaint     Interim evaluation    History of Present Illness     The patient is seen for interim evaluation.  He has tenderness in his anterior abdomen at the incision site.  It appears as though this is becoming infected.  He also notes an adequate pain control.  He has not been asking for the as needed oxycodone.    The following portions of the patient's history were reviewed and updated as appropriate: current medications, past family history, past medical history, past social history, past surgical history and problem list.    Allergies:  Allergies   Allergen Reactions    Abilify [Aripiprazole] Tremor     Shaking      Cephalexin Diarrhea    Molds & Smuts Allergic Rhinitis     Other Reaction(s): Allergic Rhinitis      Red itchy eye, congestion    Pregabalin Tremor     Lyrica - shaking feeling       Review of Systems     Review of Systems   Constitutional:  Negative for activity change, appetite change, chills, diaphoresis, fatigue and unexpected weight change.   HENT:  Negative for congestion, ear discharge, ear pain, hearing loss, nosebleeds and rhinorrhea.    Eyes:  Negative for pain, redness, itching and visual disturbance.   Respiratory:  Negative for cough, choking, chest tightness and shortness of breath.    Cardiovascular:  Negative for chest pain and leg swelling.   Gastrointestinal:  Positive for abdominal pain. Negative for blood in stool, constipation, diarrhea and nausea.   Endocrine: Negative for cold intolerance, polydipsia  and polyphagia.   Genitourinary:  Negative for dysuria, frequency, hematuria and urgency.   Musculoskeletal:  Positive for back pain. Negative for arthralgias, gait problem, joint swelling, neck pain and neck stiffness.   Skin:  Positive for wound. Negative for color change and rash.   Allergic/Immunologic: Negative for environmental allergies and food allergies.   Neurological:  Negative for dizziness, tremors, seizures, speech difficulty, numbness and headaches.   Hematological:  Negative for adenopathy. Does not bruise/bleed easily.   Psychiatric/Behavioral:  Negative for behavioral problems, dysphoric mood, hallucinations and self-injury.        Medications and orders     All medications reviewed and updated in [facility] EMR.      Objective     Vitals: per nursing home records    Physical Exam  Constitutional:       General: He is not in acute distress.     Appearance: He is well-developed. He is not diaphoretic.   HENT:      Head: Normocephalic and atraumatic.      Right Ear: External ear normal.      Left Ear: External ear normal.      Nose: Nose normal.      Mouth/Throat:      Pharynx: No oropharyngeal exudate.   Eyes:      General: No scleral icterus.        Right eye: No discharge.         Left eye: No discharge.      Conjunctiva/sclera: Conjunctivae normal.      Pupils: Pupils are equal, round, and reactive to light.   Neck:      Thyroid: No thyromegaly.   Cardiovascular:      Rate and Rhythm: Normal rate. Rhythm irregular.      Heart sounds: Normal heart sounds. No murmur heard.  Pulmonary:      Effort: Pulmonary effort is normal.      Breath sounds: Normal breath sounds. No wheezing or rales.   Abdominal:      General: Bowel sounds are normal.      Palpations: Abdomen is soft. There is no mass.      Tenderness: There is abdominal tenderness. There is no guarding.   Musculoskeletal:         General: Tenderness present. Normal range of motion.      Cervical back: Normal range of motion and neck supple.    Lymphadenopathy:      Cervical: No cervical adenopathy.   Skin:     General: Skin is warm and dry.      Findings: Erythema present.   Neurological:      Mental Status: He is alert and oriented to person, place, and time.      Deep Tendon Reflexes: Reflexes are normal and symmetric.   Psychiatric:         Thought Content: Thought content normal.         Judgment: Judgment normal.         Pertinent Laboratory/Diagnostic Studies:     The following studies were reviewed please see chart or hospital paperwork for details.    Space for lab dictation labs show anemia    - Start clindamycin for the next 7 days.  Recheck labs.  Schedule oxycodone 15 mg at 9 1 and 5 and every 4 hours thereafter as needed    Moustapha Wallace,   4/3/2025 2:16 PM

## 2025-04-07 ENCOUNTER — NURSING HOME VISIT (OUTPATIENT)
Dept: FAMILY MEDICINE CLINIC | Facility: CLINIC | Age: 70
End: 2025-04-07
Payer: COMMERCIAL

## 2025-04-07 DIAGNOSIS — I10 PRIMARY HYPERTENSION: ICD-10-CM

## 2025-04-07 DIAGNOSIS — M54.42 ACUTE BILATERAL LOW BACK PAIN WITH BILATERAL SCIATICA: ICD-10-CM

## 2025-04-07 DIAGNOSIS — E11.65 TYPE 2 DIABETES MELLITUS WITH HYPERGLYCEMIA, WITHOUT LONG-TERM CURRENT USE OF INSULIN (HCC): ICD-10-CM

## 2025-04-07 DIAGNOSIS — E11.42 DIABETIC PERIPHERAL NEUROPATHY (HCC): ICD-10-CM

## 2025-04-07 DIAGNOSIS — M54.41 ACUTE BILATERAL LOW BACK PAIN WITH BILATERAL SCIATICA: ICD-10-CM

## 2025-04-07 DIAGNOSIS — M47.816 LUMBAR SPONDYLOSIS: ICD-10-CM

## 2025-04-07 DIAGNOSIS — L08.9 WOUND INFECTION: Primary | ICD-10-CM

## 2025-04-07 DIAGNOSIS — T14.8XXA WOUND INFECTION: Primary | ICD-10-CM

## 2025-04-07 PROCEDURE — 99308 SBSQ NF CARE LOW MDM 20: CPT | Performed by: FAMILY MEDICINE

## 2025-04-07 NOTE — PROGRESS NOTES
[facility]  [address] [place] [address]  Facility: [facility]    NAME: Juan San  AGE: 69 y.o. SEX: male    DATE OF ENCOUNTER: 4/7/2025    Code status:  DNR w/ Hospitalization    Assessment and Plan     1. Wound infection  2. Lumbar spondylosis  3. Acute bilateral low back pain with bilateral sciatica  4. Type 2 diabetes mellitus with hyperglycemia, without long-term current use of insulin (HCC)  5. Diabetic peripheral neuropathy (HCC)  6. Primary hypertension      All medications and routine orders were reviewed and updated as needed.    Plan discussed with: Family member    Chief Complaint     Interim evaluation    History of Present Illness     The patient is seen for interim evaluation.  His abdominal wound is looking better.  There has been a scant amount of purulent drainage there.  His back incision is healed and looks great.  He continues to complain of pain.  He is receiving regular scheduled oxycodone at 15 mg.  His bowels are moving.  He has had some increased swelling in his legs and we have restarted him on some Lasix    The following portions of the patient's history were reviewed and updated as appropriate: current medications, past family history, past medical history, past social history, past surgical history and problem list.    Allergies:  Allergies   Allergen Reactions    Abilify [Aripiprazole] Tremor     Shaking      Cephalexin Diarrhea    Molds & Smuts Allergic Rhinitis     Other Reaction(s): Allergic Rhinitis      Red itchy eye, congestion    Pregabalin Tremor     Lyrica - shaking feeling       Review of Systems     Review of Systems   Constitutional:  Negative for activity change, appetite change, chills, diaphoresis, fatigue and unexpected weight change.   HENT:  Negative for congestion, ear discharge, ear pain, hearing loss, nosebleeds and rhinorrhea.    Eyes:  Negative for pain, redness, itching and visual disturbance.   Respiratory:  Negative  for cough, choking, chest tightness and shortness of breath.    Cardiovascular:  Positive for leg swelling. Negative for chest pain.   Gastrointestinal:  Negative for abdominal pain, blood in stool, constipation, diarrhea and nausea.   Endocrine: Negative for cold intolerance, polydipsia and polyphagia.   Genitourinary:  Negative for dysuria, frequency, hematuria and urgency.   Musculoskeletal:  Positive for back pain and gait problem. Negative for arthralgias, joint swelling, neck pain and neck stiffness.   Skin:  Negative for color change and rash.   Allergic/Immunologic: Negative for environmental allergies and food allergies.   Neurological:  Positive for weakness. Negative for dizziness, tremors, seizures, speech difficulty, numbness and headaches.   Hematological:  Negative for adenopathy. Does not bruise/bleed easily.   Psychiatric/Behavioral:  Negative for behavioral problems, dysphoric mood, hallucinations and self-injury.        Medications and orders     All medications reviewed and updated in [facility]f EMR.      Objective     Vitals: per nursing home records    Physical Exam  Constitutional:       General: He is not in acute distress.     Appearance: He is well-developed. He is not diaphoretic.   HENT:      Head: Normocephalic and atraumatic.      Right Ear: External ear normal.      Left Ear: External ear normal.      Nose: Nose normal.      Mouth/Throat:      Pharynx: No oropharyngeal exudate.   Eyes:      General: No scleral icterus.        Right eye: No discharge.         Left eye: No discharge.      Conjunctiva/sclera: Conjunctivae normal.      Pupils: Pupils are equal, round, and reactive to light.   Neck:      Thyroid: No thyromegaly.   Cardiovascular:      Rate and Rhythm: Normal rate and regular rhythm.      Heart sounds: Normal heart sounds. No murmur heard.  Pulmonary:      Effort: Pulmonary effort is normal.      Breath sounds: Normal breath sounds. No wheezing or rales.   Abdominal:       General: Bowel sounds are normal.      Palpations: Abdomen is soft. There is no mass.      Tenderness: There is no abdominal tenderness. There is no guarding.   Musculoskeletal:         General: Tenderness present. Normal range of motion.      Cervical back: Normal range of motion and neck supple.      Right lower leg: Edema present.      Left lower leg: Edema present.   Lymphadenopathy:      Cervical: No cervical adenopathy.   Skin:     General: Skin is warm and dry.      Findings: Erythema present.   Neurological:      Mental Status: He is alert and oriented to person, place, and time.      Motor: Weakness present.      Deep Tendon Reflexes: Reflexes are normal and symmetric.   Psychiatric:         Thought Content: Thought content normal.         Judgment: Judgment normal.         Pertinent Laboratory/Diagnostic Studies:     The following studies were reviewed please see chart or hospital paperwork for details.    Space for lab dictation no new diagnostics    - Finish the clindamycin.  Continue the current therapy plan and medication regimen    Moustapha Wallace DO  4/7/2025 12:54 PM

## 2025-04-10 ENCOUNTER — NURSING HOME VISIT (OUTPATIENT)
Dept: FAMILY MEDICINE CLINIC | Facility: CLINIC | Age: 70
End: 2025-04-10
Payer: COMMERCIAL

## 2025-04-10 DIAGNOSIS — M54.42 ACUTE BILATERAL LOW BACK PAIN WITH BILATERAL SCIATICA: ICD-10-CM

## 2025-04-10 DIAGNOSIS — E11.65 TYPE 2 DIABETES MELLITUS WITH HYPERGLYCEMIA, WITHOUT LONG-TERM CURRENT USE OF INSULIN (HCC): ICD-10-CM

## 2025-04-10 DIAGNOSIS — G89.4 CHRONIC PAIN SYNDROME: ICD-10-CM

## 2025-04-10 DIAGNOSIS — M47.816 LUMBAR SPONDYLOSIS: Primary | ICD-10-CM

## 2025-04-10 DIAGNOSIS — M54.41 ACUTE BILATERAL LOW BACK PAIN WITH BILATERAL SCIATICA: ICD-10-CM

## 2025-04-10 DIAGNOSIS — Z98.1 STATUS POST LUMBAR SPINAL FUSION: ICD-10-CM

## 2025-04-10 DIAGNOSIS — T14.8XXA WOUND INFECTION: ICD-10-CM

## 2025-04-10 DIAGNOSIS — L08.9 WOUND INFECTION: ICD-10-CM

## 2025-04-10 PROCEDURE — 99315 NF DSCHRG MGMT 30 MIN/LESS: CPT | Performed by: FAMILY MEDICINE

## 2025-04-10 NOTE — PROGRESS NOTES
[facility]  [address] [place] [address]  Facility: [facility]    NAME: Juan San  AGE: 69 y.o. SEX: male    DATE OF ENCOUNTER: 4/10/2025    Code status:  DNR w/ Hospitalization    Assessment and Plan     1. Lumbar spondylosis  2. Wound infection  3. Status post lumbar spinal fusion  4. Chronic pain syndrome  5. Acute bilateral low back pain with bilateral sciatica  6. Type 2 diabetes mellitus with hyperglycemia, without long-term current use of insulin (HCC)      All medications and routine orders were reviewed and updated as needed.    Plan discussed with: Family member    Chief Complaint     Interim evaluation    History of Present Illness     The patient will be discharging over the next couple days to home.  He made nice progress with his therapy and is ad [place]. for the most part.  His abdominal incision continues to look better.  His back incision is perfect.  He continues to complain of pain which I suspect will be an ongoing issue for him.    The following portions of the patient's history were reviewed and updated as appropriate: current medications, past family history, past medical history, past social history, past surgical history and problem list.    Allergies:  Allergies   Allergen Reactions    Abilify [Aripiprazole] Tremor     Shaking      Cephalexin Diarrhea    Molds & Smuts Allergic Rhinitis     Other Reaction(s): Allergic Rhinitis      Red itchy eye, congestion    Pregabalin Tremor     Lyrica - shaking feeling       Review of Systems     Review of Systems   Constitutional:  Negative for activity change, appetite change, chills, diaphoresis, fatigue and unexpected weight change.   HENT:  Negative for congestion, ear discharge, ear pain, hearing loss, nosebleeds and rhinorrhea.    Eyes:  Negative for pain, redness, itching and visual disturbance.   Respiratory:  Negative for cough, choking, chest tightness and shortness of breath.    Cardiovascular:   Negative for chest pain and leg swelling.   Gastrointestinal:  Negative for abdominal pain, blood in stool, constipation, diarrhea and nausea.   Endocrine: Negative for cold intolerance, polydipsia and polyphagia.   Genitourinary:  Negative for dysuria, frequency, hematuria and urgency.   Musculoskeletal:  Positive for back pain. Negative for arthralgias, gait problem, joint swelling, neck pain and neck stiffness.   Skin:  Positive for wound. Negative for color change and rash.   Allergic/Immunologic: Negative for environmental allergies and food allergies.   Neurological:  Positive for weakness. Negative for dizziness, tremors, seizures, speech difficulty, numbness and headaches.   Hematological:  Negative for adenopathy. Does not bruise/bleed easily.   Psychiatric/Behavioral:  Negative for behavioral problems, dysphoric mood, hallucinations and self-injury.        Medications and orders     All medications reviewed and updated in longterm EMR.      Objective     Vitals: per nursing home records    Physical Exam  Constitutional:       General: He is not in acute distress.     Appearance: He is well-developed. He is not diaphoretic.   HENT:      Head: Normocephalic and atraumatic.      Right Ear: External ear normal.      Left Ear: External ear normal.      Nose: Nose normal.      Mouth/Throat:      Pharynx: No oropharyngeal exudate.   Eyes:      General: No scleral icterus.        Right eye: No discharge.         Left eye: No discharge.      Conjunctiva/sclera: Conjunctivae normal.      Pupils: Pupils are equal, round, and reactive to light.   Neck:      Thyroid: No thyromegaly.   Cardiovascular:      Rate and Rhythm: Normal rate. Rhythm irregular.      Heart sounds: Normal heart sounds. No murmur heard.  Pulmonary:      Effort: Pulmonary effort is normal.      Breath sounds: Normal breath sounds. No wheezing or rales.   Abdominal:      General: Bowel sounds are normal.      Palpations: Abdomen is soft. There  is no mass.      Tenderness: There is no abdominal tenderness. There is no guarding.   Musculoskeletal:         General: Tenderness present. Normal range of motion.      Cervical back: Normal range of motion and neck supple.   Lymphadenopathy:      Cervical: No cervical adenopathy.   Skin:     General: Skin is warm and dry.      Findings: Erythema present.   Neurological:      Mental Status: He is alert and oriented to person, place, and time.      Motor: Weakness present.      Deep Tendon Reflexes: Reflexes are normal and symmetric.   Psychiatric:         Thought Content: Thought content normal.         Judgment: Judgment normal.         Pertinent Laboratory/Diagnostic Studies:     The following studies were reviewed please see chart or hospital paperwork for details.    Space for lab dictation no new diagnostics    - Discharge planning    Moustapha Wallace DO  4/10/2025 1:06 PM

## 2025-04-14 ENCOUNTER — HOME CARE VISIT (OUTPATIENT)
Dept: HOME HEALTH SERVICES | Facility: HOME HEALTHCARE | Age: 70
End: 2025-04-14

## 2025-04-22 ENCOUNTER — HOME HEALTH ADMISSION (OUTPATIENT)
Dept: HOME HEALTH SERVICES | Facility: HOME HEALTHCARE | Age: 70
End: 2025-04-22
Payer: COMMERCIAL

## 2025-04-23 ENCOUNTER — HOME CARE VISIT (OUTPATIENT)
Dept: HOME HEALTH SERVICES | Facility: HOME HEALTHCARE | Age: 70
End: 2025-04-23

## 2025-04-23 ENCOUNTER — HOME CARE VISIT (OUTPATIENT)
Dept: HOME HEALTH SERVICES | Facility: HOME HEALTHCARE | Age: 70
End: 2025-04-23
Attending: NURSE PRACTITIONER
Payer: COMMERCIAL

## 2025-04-23 VITALS
WEIGHT: 175.25 LBS | TEMPERATURE: 97.2 F | BODY MASS INDEX: 26.56 KG/M2 | HEART RATE: 84 BPM | SYSTOLIC BLOOD PRESSURE: 102 MMHG | HEIGHT: 68 IN | OXYGEN SATURATION: 98 % | DIASTOLIC BLOOD PRESSURE: 50 MMHG | RESPIRATION RATE: 20 BRPM

## 2025-04-23 PROCEDURE — G0299 HHS/HOSPICE OF RN EA 15 MIN: HCPCS

## 2025-04-25 ENCOUNTER — HOME CARE VISIT (OUTPATIENT)
Dept: HOME HEALTH SERVICES | Facility: HOME HEALTHCARE | Age: 70
End: 2025-04-25
Payer: COMMERCIAL

## 2025-04-25 VITALS
HEART RATE: 82 BPM | RESPIRATION RATE: 19 BRPM | SYSTOLIC BLOOD PRESSURE: 108 MMHG | DIASTOLIC BLOOD PRESSURE: 52 MMHG | OXYGEN SATURATION: 98 % | TEMPERATURE: 97 F

## 2025-04-25 PROCEDURE — G0299 HHS/HOSPICE OF RN EA 15 MIN: HCPCS

## 2025-04-28 ENCOUNTER — HOME CARE VISIT (OUTPATIENT)
Dept: HOME HEALTH SERVICES | Facility: HOME HEALTHCARE | Age: 70
End: 2025-04-28
Payer: COMMERCIAL

## 2025-04-28 VITALS
SYSTOLIC BLOOD PRESSURE: 132 MMHG | DIASTOLIC BLOOD PRESSURE: 60 MMHG | RESPIRATION RATE: 16 BRPM | OXYGEN SATURATION: 93 % | TEMPERATURE: 97 F | HEART RATE: 86 BPM

## 2025-04-28 PROCEDURE — G0299 HHS/HOSPICE OF RN EA 15 MIN: HCPCS

## 2025-04-28 PROCEDURE — G0151 HHCP-SERV OF PT,EA 15 MIN: HCPCS

## 2025-04-29 ENCOUNTER — OFFICE VISIT (OUTPATIENT)
Dept: FAMILY MEDICINE CLINIC | Facility: CLINIC | Age: 70
End: 2025-04-29
Payer: COMMERCIAL

## 2025-04-29 VITALS
OXYGEN SATURATION: 98 % | SYSTOLIC BLOOD PRESSURE: 142 MMHG | HEIGHT: 68 IN | WEIGHT: 169.2 LBS | BODY MASS INDEX: 25.64 KG/M2 | RESPIRATION RATE: 16 BRPM | HEART RATE: 118 BPM | TEMPERATURE: 98.5 F | DIASTOLIC BLOOD PRESSURE: 80 MMHG

## 2025-04-29 DIAGNOSIS — F41.9 ANXIETY AND DEPRESSION: ICD-10-CM

## 2025-04-29 DIAGNOSIS — M54.16 LUMBAR RADICULOPATHY: ICD-10-CM

## 2025-04-29 DIAGNOSIS — M79.89 LEFT LEG SWELLING: ICD-10-CM

## 2025-04-29 DIAGNOSIS — J41.1 BRONCHITIS, MUCOPURULENT RECURRENT (HCC): ICD-10-CM

## 2025-04-29 DIAGNOSIS — Z98.1 STATUS POST LUMBAR SPINAL FUSION: ICD-10-CM

## 2025-04-29 DIAGNOSIS — C73 THYROID CANCER (HCC): ICD-10-CM

## 2025-04-29 DIAGNOSIS — Z09 FOLLOW-UP EXAM: Primary | ICD-10-CM

## 2025-04-29 DIAGNOSIS — K70.30 ALCOHOLIC CIRRHOSIS OF LIVER WITHOUT ASCITES (HCC): ICD-10-CM

## 2025-04-29 DIAGNOSIS — E11.65 TYPE 2 DIABETES MELLITUS WITH HYPERGLYCEMIA, WITHOUT LONG-TERM CURRENT USE OF INSULIN (HCC): ICD-10-CM

## 2025-04-29 DIAGNOSIS — F32.A ANXIETY AND DEPRESSION: ICD-10-CM

## 2025-04-29 DIAGNOSIS — L97.521 DIABETIC ULCER OF TOE OF LEFT FOOT ASSOCIATED WITH TYPE 2 DIABETES MELLITUS, LIMITED TO BREAKDOWN OF SKIN (HCC): ICD-10-CM

## 2025-04-29 DIAGNOSIS — D50.9 IRON DEFICIENCY ANEMIA, UNSPECIFIED IRON DEFICIENCY ANEMIA TYPE: ICD-10-CM

## 2025-04-29 DIAGNOSIS — I10 PRIMARY HYPERTENSION: ICD-10-CM

## 2025-04-29 DIAGNOSIS — F31.81 BIPOLAR II DISORDER (HCC): ICD-10-CM

## 2025-04-29 DIAGNOSIS — F13.20 BENZODIAZEPINE DEPENDENCE (HCC): ICD-10-CM

## 2025-04-29 DIAGNOSIS — R26.2 AMBULATORY DYSFUNCTION: ICD-10-CM

## 2025-04-29 DIAGNOSIS — E11.621 DIABETIC ULCER OF TOE OF LEFT FOOT ASSOCIATED WITH TYPE 2 DIABETES MELLITUS, LIMITED TO BREAKDOWN OF SKIN (HCC): ICD-10-CM

## 2025-04-29 PROCEDURE — G2211 COMPLEX E/M VISIT ADD ON: HCPCS

## 2025-04-29 PROCEDURE — 99215 OFFICE O/P EST HI 40 MIN: CPT

## 2025-04-29 RX ORDER — ENOXAPARIN SODIUM 100 MG/ML
40 INJECTION SUBCUTANEOUS DAILY
COMMUNITY
Start: 2025-03-26

## 2025-04-29 RX ORDER — LOSARTAN POTASSIUM AND HYDROCHLOROTHIAZIDE 25; 100 MG/1; MG/1
1 TABLET ORAL DAILY
COMMUNITY
Start: 2025-03-10 | End: 2025-04-30 | Stop reason: SDUPTHER

## 2025-04-29 RX ORDER — FERROUS SULFATE 325(65) MG
1 TABLET ORAL EVERY OTHER DAY
COMMUNITY
Start: 2025-03-26

## 2025-04-29 RX ORDER — FUROSEMIDE 20 MG/1
20 TABLET ORAL DAILY PRN
Qty: 90 TABLET | Refills: 1 | Status: SHIPPED | OUTPATIENT
Start: 2025-04-29

## 2025-04-29 NOTE — ASSESSMENT & PLAN NOTE
S/P L5-S1 ALIF and redo T10-P fusion with Dr. Call on 3/21/25 through [facility]. Pt continues to follow with Neurosurgery. Spine incision looks great and is healing nicely. Abdominal incision healing, no drainage. He is doing well but is having pain. Rehab record review shows his pain was well controlled with oxycodone 15 mg. PDMP reviewed. No red flags. A controlled substance agreement was signed today. We discussed the importance of limiting medication use, risk for dependence, tolerance, safety, and other specifics outlined in the agreement. Oxycodone 15 mg q4hrs ordered today. Pt instructed on limiting use. Will try to wean narcotic medications in the future. Pt is agreeable to this. Referral to Spine and Pain today. Continue Tylenol PRN, heat, ice, lidocaine patches. Currently completing home PT/OT and has [facility] for wound care.   Orders:  •  Ambulatory referral to Spine & Pain Management; Future  •  oxyCODONE (Roxicodone) 5 immediate release tablet; Take 3 tablets (15 mg total) by mouth every 4 (four) hours as needed for severe pain Max Daily Amount: 90 mg

## 2025-04-29 NOTE — ASSESSMENT & PLAN NOTE
S/P partial thyroidectomy. No current medications. Follows with Oncology through [facility].

## 2025-04-29 NOTE — ASSESSMENT & PLAN NOTE
March2025 labs reviewed. Currently well-controlled. Pt w/an upcoming Endocrinology appt in June. Continue metformin 1000 mg daily and Jardiance 10 mg. Refill today. Continue diabetic dietary modifications.  Lab Results   Component Value Date    HGBA1C 5.8 (H) 03/11/2025     Orders:  •  metFORMIN (GLUCOPHAGE) 1000 MG tablet; Take 1 tablet (1,000 mg total) by mouth daily with breakfast  •  Jardiance 10 MG TABS tablet; Take 1 tablet (10 mg total) by mouth every morning

## 2025-04-29 NOTE — PROGRESS NOTES
Name: Juan San      : 1955      MRN: [id number]  Encounter Provider: RUIZ Hanks  Encounter Date: 2025   Encounter department: [facility]  :  Assessment & Plan  Follow-up exam         Status post lumbar spinal fusion  Juan looks great today! He is visibly feeling better, sitting up taller. Pt is S/P L5-S1 ALIF and redo T10-P fusion with Dr. Call on 3/21/25 through [facility]. Pt continues to follow with Neurosurgery. He is doing well but continues to have pain. Rehab record review shows his pain was well controlled with oxycodone 15 mg. PDMP reviewed. No red flags. A controlled substance agreement was signed today. We discussed the importance of limiting medication use, risk for dependence, tolerance, safety, and other specifics outlined in the agreement. Oxycodone 15 mg q4hrs ordered today. Pt instructed on limiting use. Will try to wean narcotic medications in the future. Pt is agreeable to this. Referral to Spine and Pain today. Continue Tylenol PRN, heat, ice, lidocaine patches. Currently completing home PT/OT and has VNA for wound care.   Orders:  •  Ambulatory referral to Spine & Pain Management; Future  •  oxyCODONE (Roxicodone) 5 immediate release tablet; Take 3 tablets (15 mg total) by mouth every 4 (four) hours as needed for severe pain Max Daily Amount: 90 mg    Primary hypertension  Has followed with Dr. Wisdom in the past, but records unavailable from any recent visits. Previous PCP prescribing HTN medications. BP currently controlled. Continue nifedipine 120 mg, losartan-hydrochlorothiazide 100-25 mg, and metoprolol 50 mg BID. Continue heart healthy dietary modifications. Refill today. Continue home BP monitoring with parameters to call if consistently >140/90.  Orders:  •  losartan-hydrochlorothiazide (HYZAAR) 100-25 MG per tablet; Take 1 tablet by mouth in the morning    Type 2 diabetes mellitus with hyperglycemia, without long-term  current use of insulin (HCC)  March2025 labs reviewed. Currently well-controlled. Pt w/an upcoming Endocrinology appt in June. Continue metformin 1000 mg daily and Jardiance 10 mg. Refill today. Continue diabetic dietary modifications.  Lab Results   Component Value Date    HGBA1C 5.8 (H) 03/11/2025     Orders:  •  metFORMIN (GLUCOPHAGE) 1000 MG tablet; Take 1 tablet (1,000 mg total) by mouth daily with breakfast  •  Jardiance 10 MG TABS tablet; Take 1 tablet (10 mg total) by mouth every morning    Thyroid cancer (HCC)  S/P partial thyroidectomy. No current medications. Follows with Oncology through [place].       Anxiety and depression  Previously followed with Psych. Stopped d/t insurance issues. Currently well controlled. Continue buspirone 5 mg BID, escitalopram 20 mg, hydroxyzinge 50 mg BID, and clonazepam 1 mg BID. PDMP reviewed. No red flags. A controlled substance agreement was signed today. We discussed the importance of limiting medication use, risk for dependence, tolerance, safety, and other specifics outlined in the agreement. Consider Psych referral in future.         Bipolar II disorder ([facility])  Previously followed with Psych. Currently well controlled. Continue mirtazapine 45 mg. Consider Psych referral in future.       Ambulatory dysfunction  Continue use of walker for ambulation assistance.  Orders:  •  Ambulatory referral to Spine & Pain Management; Future    Lumbar radiculopathy  S/P L5-S1 ALIF and redo T10-P fusion with Dr. Call on 3/21/25 through [facility]. Pt continues to follow with Neurosurgery. Spine incision looks great and is healing nicely. Abdominal incision healing, no drainage. He is doing well but is having pain. Rehab record review shows his pain was well controlled with oxycodone 15 mg. PDMP reviewed. No red flags. A controlled substance agreement was signed today. We discussed the importance of limiting medication use, risk for dependence, tolerance, safety, and other specifics  outlined in the agreement. Oxycodone 15 mg q4hrs ordered today. Pt instructed on limiting use. Will try to wean narcotic medications in the future. Pt is agreeable to this. Referral to Spine and Pain today. Continue Tylenol PRN, heat, ice, lidocaine patches. Currently completing home PT/OT and has VNA for wound care.   Orders:  •  Ambulatory referral to Spine & Pain Management; Future  •  oxyCODONE (Roxicodone) 5 immediate release tablet; Take 3 tablets (15 mg total) by mouth every 4 (four) hours as needed for severe pain Max Daily Amount: 90 mg    Left leg swelling  Continue furosemide 20 mg. Refill today.  Orders:  •  furosemide (LASIX) 20 mg tablet; Take 1 tablet (20 mg total) by mouth daily as needed (for swelling) take 1 tab by mouth daily as needed for weight gain more than 3lbs overnight or more than 5lb in a week, or LE swelling.    Iron deficiency anemia, unspecified iron deficiency anemia type  March2025 labs reviewed. Pt w/moderate anemia. Continue ferrous sulfate 325 mg. Will recheck labs. The patient will obtain the lab work ordered today. The patient will be notified of results when available and also any further recommendations. May need iron infusion.  Lab Results   Component Value Date    WBC 7.46 02/20/2025    HGB 8.1 (L) 02/20/2025    HCT 24.0 (L) 02/20/2025    MCV 77 (L) 02/20/2025     02/20/2025     Orders:  •  CBC and differential; Future  •  Comprehensive metabolic panel; Future    Benzodiazepine dependence (HCC)  Pt continues use of clonazepam 1 mg BID. Discussed tapering off in the future. Pt is agreeable. PDMP reviewed. No red flags. A controlled substance agreement was signed today. We discussed the importance of limiting medication use, risk for dependence, tolerance, safety, and other specifics outlined in the agreement. Narcan ordered.  Orders:  •  naloxone (NARCAN) 4 mg/0.1 mL nasal spray; 0.1 mL (4 mg total) into each nostril every 3 (three) minutes as needed for opioid  reversal or respiratory depression    Alcoholic cirrhosis of liver without ascites (HCC)  Stable. Pt continues with long-term alcohol cessation.       Bronchitis, mucopurulent recurrent (HCC)  Stable. No current concerns.       Diabetic ulcer of toe of left foot associated with type 2 diabetes mellitus, limited to breakdown of skin (HCC)  Pt follows with Podiatry. Possible pressure type ulcer beginning on second toe of left foot. Pt receiving home wound care following spinal surgery. Pt encouraged to follow up with his Podiatry provider and wound care. Pt counseled on ulcer care.      Lab Results   Component Value Date    HGBA1C 5.8 (H) 03/11/2025              Follow up in 3 months for Medicare AWV or sooner if needed. Pt has a complicated medical history and requires 45 minute appointments.     History of Present Illness   Juan San is a 69 y.o. year old male who presents today for follow up after back surgery in March and a stay in rehab at [facility]. He also has a concern of intermittent left foot swelling. Reports an injury on his foot a long time ago. Swelling started on and off last August after a wound. Just got home from rehab at [facility] on 4/13/2025. Wound care and therapy coming 2-3x for incisions - abdominal and back    Had a wound on his heal that he picked a piece of skin off and it had some drainage - appears to be healing. Using triple antibiotic ointment with improvement.   Specialists - Cardiology - [facility] - Dr. Wisdom, Neurosurgery  Will be seeing Endocrinology  Sees Podiatry      Review of Systems   Constitutional: Negative.  Negative for chills, fatigue and fever.   HENT: Negative.  Negative for congestion, ear pain, rhinorrhea and sore throat.    Eyes: Negative.  Negative for pain and visual disturbance.   Respiratory: Negative.  Negative for cough and shortness of breath.    Cardiovascular: Negative.  Negative for chest pain, palpitations and leg swelling.   Gastrointestinal:  Negative for  "abdominal pain, constipation, diarrhea, nausea and vomiting.   Endocrine: Negative.    Genitourinary: Negative.  Negative for dysuria, frequency and urgency.   Musculoskeletal: Negative.  Negative for back pain and myalgias.   Skin: Negative.  Negative for rash.   Allergic/Immunologic: Negative.    Neurological: Negative.  Negative for dizziness, weakness, light-headedness and headaches.   Hematological: Negative.    Psychiatric/Behavioral: Negative.         Objective   /80 (BP Location: Left arm, Patient Position: Sitting, Cuff Size: Standard)   Pulse (!) 118   Temp 98.5 °F (36.9 °C) (Tympanic)   Resp 16   Ht 5' 8\" (1.727 m)   Wt 76.7 kg (169 lb 3.2 oz)   SpO2 98%   BMI 25.73 kg/m²      Physical Exam  Vitals and nursing note reviewed.   Constitutional:       General: He is not in acute distress.     Appearance: Normal appearance. He is not ill-appearing.   HENT:      Head: Normocephalic and atraumatic.      Right Ear: External ear normal.      Left Ear: External ear normal.      Nose: Nose normal.      Mouth/Throat:      Mouth: Mucous membranes are moist.      Pharynx: Oropharynx is clear.   Eyes:      Extraocular Movements: Extraocular movements intact.      Conjunctiva/sclera: Conjunctivae normal.      Pupils: Pupils are equal, round, and reactive to light.   Cardiovascular:      Rate and Rhythm: Normal rate and regular rhythm.      Pulses: no weak pulses.           Dorsalis pedis pulses are 1+ on the right side and 1+ on the left side.        Posterior tibial pulses are 1+ on the right side and 1+ on the left side.      Heart sounds: Normal heart sounds. No murmur heard.  Pulmonary:      Effort: Pulmonary effort is normal. No respiratory distress.      Breath sounds: Normal breath sounds. No wheezing.   Abdominal:      General: Abdomen is flat. Bowel sounds are normal.      Palpations: Abdomen is soft.      Tenderness: There is no abdominal tenderness.   Musculoskeletal:         General: Normal " range of motion.      Cervical back: Normal range of motion.      Right foot: Normal range of motion. No deformity, bunion, Charcot foot, foot drop or prominent metatarsal heads.      Left foot: Normal range of motion. No deformity, bunion, Charcot foot, foot drop or prominent metatarsal heads.        Feet:    Feet:      Right foot:      Protective Sensation: 8 sites tested.  3 sites sensed.      Skin integrity: Callus, dry skin and fissure (between toes) present. No ulcer, skin breakdown, erythema or warmth.      Toenail Condition: Fungal disease present.     Left foot:      Protective Sensation: 8 sites tested.  3 sites sensed.      Skin integrity: Ulcer, callus and dry skin present. No skin breakdown, erythema, warmth or fissure.      Toenail Condition: Left toenails are long. Fungal disease present.  Skin:     General: Skin is warm and dry.      Capillary Refill: Capillary refill takes less than 2 seconds.   Neurological:      General: No focal deficit present.      Mental Status: He is alert and oriented to person, place, and time.   Psychiatric:         Mood and Affect: Mood normal.         Behavior: Behavior normal.         Thought Content: Thought content normal.     Diabetic Foot Exam    Patient's shoes and socks removed.    Right Foot/Ankle   Right Foot Inspection  Skin Exam: skin normal, skin intact, dry skin, callus and callus. No warmth, no erythema, no maceration, no abnormal color, no pre-ulcer and no ulcer.     Toe Exam: ROM and strength within normal limits.     Sensory   Monofilament testing: diminished    Vascular  Capillary refills: < 3 seconds  The right DP pulse is 1+. The right PT pulse is 1+.     Right Toe  - Comprehensive Exam  Hallux valgus: no      Left Foot/Ankle  Left Foot Inspection  Skin Exam: skin normal, skin intact, dry skin, ulcer and callus. No warmth, no erythema, no maceration, normal color and no pre-ulcer.     Toe Exam: ROM and strength within normal limits.     Sensory    Monofilament testing: diminished    Vascular  Capillary refills: < 3 seconds  The left DP pulse is 1+. The left PT pulse is 1+.     Left Toe  - Comprehensive Exam  Hallux valgus: no      Assign Risk Category  No deformity present  Loss of protective sensation  No weak pulses  Risk: 1        Administrative Statements   I have spent a total time of 85 minutes in caring for this patient on the day of the visit/encounter including Instructions for management, Patient and family education, Importance of tx compliance, Risk factor reductions, Impressions, Counseling / Coordination of care, Documenting in the medical record, Reviewing/placing orders in the medical record (including tests, medications, and/or procedures), and Obtaining or reviewing history  .

## 2025-04-29 NOTE — ASSESSMENT & PLAN NOTE
Has followed with Dr. Wisdom in the past, but records unavailable from any recent visits. Previous PCP prescribing HTN medications. BP currently controlled. Continue nifedipine 120 mg, losartan-hydrochlorothiazide 100-25 mg, and metoprolol 50 mg BID. Continue heart healthy dietary modifications. Refill today. Continue home BP monitoring with parameters to call if consistently >140/90.  Orders:  •  losartan-hydrochlorothiazide (HYZAAR) 100-25 MG per tablet; Take 1 tablet by mouth in the morning

## 2025-04-29 NOTE — ASSESSMENT & PLAN NOTE
Pt continues use of clonazepam 1 mg BID. Discussed tapering off in the future. Pt is agreeable. PDMP reviewed. No red flags. A controlled substance agreement was signed today. We discussed the importance of limiting medication use, risk for dependence, tolerance, safety, and other specifics outlined in the agreement. Narcan ordered.  Orders:  •  naloxone (NARCAN) 4 mg/0.1 mL nasal spray; 0.1 mL (4 mg total) into each nostril every 3 (three) minutes as needed for opioid reversal or respiratory depression

## 2025-04-29 NOTE — ASSESSMENT & PLAN NOTE
Continue use of walker for ambulation assistance.  Orders:  •  Ambulatory referral to Spine & Pain Management; Future

## 2025-04-29 NOTE — ASSESSMENT & PLAN NOTE
March2025 labs reviewed. Pt w/moderate anemia. Continue ferrous sulfate 325 mg. Will recheck labs. The patient will obtain the lab work ordered today. The patient will be notified of results when available and also any further recommendations. May need iron infusion.  Lab Results   Component Value Date    WBC 7.46 02/20/2025    HGB 8.1 (L) 02/20/2025    HCT 24.0 (L) 02/20/2025    MCV 77 (L) 02/20/2025     02/20/2025     Orders:  •  CBC and differential; Future  •  Comprehensive metabolic panel; Future

## 2025-04-29 NOTE — ASSESSMENT & PLAN NOTE
Previously followed with Psych. Stopped d/t insurance issues. Currently well controlled. Continue buspirone 5 mg BID, escitalopram 20 mg, hydroxyzinge 50 mg BID, and clonazepam 1 mg BID. PDMP reviewed. No red flags. A controlled substance agreement was signed today. We discussed the importance of limiting medication use, risk for dependence, tolerance, safety, and other specifics outlined in the agreement. Consider Psych referral in future.

## 2025-04-29 NOTE — ASSESSMENT & PLAN NOTE
Previously followed with Psych. Currently well controlled. Continue mirtazapine 45 mg. Consider Psych referral in future.

## 2025-04-29 NOTE — ASSESSMENT & PLAN NOTE
Juan looks great today! He is visibly feeling better, sitting up taller. Pt is S/P L5-S1 ALIF and redo T10-P fusion with Dr. Call on 3/21/25 through [facility]. Pt continues to follow with Neurosurgery. He is doing well but continues to have pain. Rehab record review shows his pain was well controlled with oxycodone 15 mg. PDMP reviewed. No red flags. A controlled substance agreement was signed today. We discussed the importance of limiting medication use, risk for dependence, tolerance, safety, and other specifics outlined in the agreement. Oxycodone 15 mg q4hrs ordered today. Pt instructed on limiting use. Will try to wean narcotic medications in the future. Pt is agreeable to this. Referral to Spine and Pain today. Continue Tylenol PRN, heat, ice, lidocaine patches. Currently completing home PT/OT and has VNA for wound care.   Orders:  •  Ambulatory referral to Spine & Pain Management; Future  •  oxyCODONE (Roxicodone) 5 immediate release tablet; Take 3 tablets (15 mg total) by mouth every 4 (four) hours as needed for severe pain Max Daily Amount: 90 mg

## 2025-04-30 ENCOUNTER — HOME CARE VISIT (OUTPATIENT)
Dept: HOME HEALTH SERVICES | Facility: HOME HEALTHCARE | Age: 70
End: 2025-04-30
Payer: COMMERCIAL

## 2025-04-30 DIAGNOSIS — M54.16 LUMBAR RADICULOPATHY: ICD-10-CM

## 2025-04-30 DIAGNOSIS — Z98.1 STATUS POST LUMBAR SPINAL FUSION: ICD-10-CM

## 2025-04-30 RX ORDER — OXYCODONE HYDROCHLORIDE 5 MG/1
15 TABLET ORAL EVERY 4 HOURS PRN
Qty: 60 TABLET | Refills: 0 | Status: SHIPPED | OUTPATIENT
Start: 2025-04-30 | End: 2025-05-02 | Stop reason: SDUPTHER

## 2025-04-30 RX ORDER — EMPAGLIFLOZIN 10 MG/1
10 TABLET, FILM COATED ORAL EVERY MORNING
Start: 2025-04-30

## 2025-04-30 RX ORDER — LOSARTAN POTASSIUM AND HYDROCHLOROTHIAZIDE 25; 100 MG/1; MG/1
1 TABLET ORAL DAILY
Qty: 90 TABLET | Refills: 1 | Status: SHIPPED | OUTPATIENT
Start: 2025-04-30

## 2025-05-01 ENCOUNTER — HOME CARE VISIT (OUTPATIENT)
Dept: HOME HEALTH SERVICES | Facility: HOME HEALTHCARE | Age: 70
End: 2025-05-01
Payer: COMMERCIAL

## 2025-05-01 VITALS
DIASTOLIC BLOOD PRESSURE: 70 MMHG | OXYGEN SATURATION: 99 % | HEART RATE: 72 BPM | RESPIRATION RATE: 19 BRPM | SYSTOLIC BLOOD PRESSURE: 138 MMHG | TEMPERATURE: 97.2 F

## 2025-05-01 VITALS
DIASTOLIC BLOOD PRESSURE: 63 MMHG | SYSTOLIC BLOOD PRESSURE: 112 MMHG | HEART RATE: 74 BPM | OXYGEN SATURATION: 97 % | TEMPERATURE: 96.7 F

## 2025-05-01 PROCEDURE — G0299 HHS/HOSPICE OF RN EA 15 MIN: HCPCS

## 2025-05-01 PROCEDURE — G0157 HHC PT ASSISTANT EA 15: HCPCS

## 2025-05-02 ENCOUNTER — TELEPHONE (OUTPATIENT)
Age: 70
End: 2025-05-02

## 2025-05-02 DIAGNOSIS — Z98.1 STATUS POST LUMBAR SPINAL FUSION: ICD-10-CM

## 2025-05-02 DIAGNOSIS — M54.16 LUMBAR RADICULOPATHY: ICD-10-CM

## 2025-05-02 PROCEDURE — 10330064 CLEANSER, WND SEA-CLEANS 6OZ  COLPLT

## 2025-05-02 PROCEDURE — 10330064 SPONGE, GAUZE 8PLY N/S 4X4" (200/PK 20P"

## 2025-05-02 RX ORDER — OXYCODONE HYDROCHLORIDE 5 MG/1
TABLET ORAL
Qty: 60 TABLET | Refills: 0 | OUTPATIENT
Start: 2025-05-02

## 2025-05-02 RX ORDER — OXYCODONE HYDROCHLORIDE 5 MG/1
15 TABLET ORAL EVERY 4 HOURS PRN
Qty: 60 TABLET | Refills: 0 | Status: SHIPPED | OUTPATIENT
Start: 2025-05-02

## 2025-05-02 NOTE — PROGRESS NOTES
Rehab record review shows his pain was well controlled with oxycodone 15 mg. Pt called requesting pharmacy change to [facility] in [place].  Oxycodone 15 mg ordered and sent to requested pharmacy.

## 2025-05-05 ENCOUNTER — HOME CARE VISIT (OUTPATIENT)
Dept: HOME HEALTH SERVICES | Facility: HOME HEALTHCARE | Age: 70
End: 2025-05-05
Payer: COMMERCIAL

## 2025-05-05 VITALS
RESPIRATION RATE: 20 BRPM | SYSTOLIC BLOOD PRESSURE: 104 MMHG | TEMPERATURE: 97.7 F | OXYGEN SATURATION: 93 % | DIASTOLIC BLOOD PRESSURE: 60 MMHG | HEART RATE: 72 BPM

## 2025-05-05 DIAGNOSIS — E11.65 TYPE 2 DIABETES MELLITUS WITH HYPERGLYCEMIA, WITHOUT LONG-TERM CURRENT USE OF INSULIN (HCC): Primary | ICD-10-CM

## 2025-05-05 PROCEDURE — G0299 HHS/HOSPICE OF RN EA 15 MIN: HCPCS

## 2025-05-05 RX ORDER — BLOOD SUGAR DIAGNOSTIC
STRIP MISCELLANEOUS
Qty: 400 EACH | Refills: 3 | Status: SHIPPED | OUTPATIENT
Start: 2025-05-05

## 2025-05-05 RX ORDER — LANCETS 33 GAUGE
EACH MISCELLANEOUS
Qty: 400 EACH | Refills: 3 | Status: SHIPPED | OUTPATIENT
Start: 2025-05-05

## 2025-05-05 RX ORDER — BLOOD-GLUCOSE METER
KIT MISCELLANEOUS
Qty: 1 KIT | Refills: 0 | Status: SHIPPED | OUTPATIENT
Start: 2025-05-05

## 2025-05-06 ENCOUNTER — HOME CARE VISIT (OUTPATIENT)
Dept: HOME HEALTH SERVICES | Facility: HOME HEALTHCARE | Age: 70
End: 2025-05-06
Payer: COMMERCIAL

## 2025-05-06 VITALS
OXYGEN SATURATION: 92 % | HEART RATE: 89 BPM | DIASTOLIC BLOOD PRESSURE: 59 MMHG | TEMPERATURE: 97.1 F | SYSTOLIC BLOOD PRESSURE: 119 MMHG

## 2025-05-06 PROCEDURE — G0157 HHC PT ASSISTANT EA 15: HCPCS

## 2025-05-08 ENCOUNTER — HOME CARE VISIT (OUTPATIENT)
Dept: HOME HEALTH SERVICES | Facility: HOME HEALTHCARE | Age: 70
End: 2025-05-08
Payer: COMMERCIAL

## 2025-05-08 ENCOUNTER — APPOINTMENT (OUTPATIENT)
Dept: LAB | Facility: CLINIC | Age: 70
DRG: 291 | End: 2025-05-08
Payer: COMMERCIAL

## 2025-05-08 VITALS
HEART RATE: 88 BPM | OXYGEN SATURATION: 92 % | DIASTOLIC BLOOD PRESSURE: 78 MMHG | SYSTOLIC BLOOD PRESSURE: 150 MMHG | TEMPERATURE: 98.3 F | RESPIRATION RATE: 16 BRPM

## 2025-05-08 DIAGNOSIS — D50.9 IRON DEFICIENCY ANEMIA, UNSPECIFIED IRON DEFICIENCY ANEMIA TYPE: ICD-10-CM

## 2025-05-08 LAB
ALBUMIN SERPL BCG-MCNC: 3.6 G/DL (ref 3.5–5)
ALP SERPL-CCNC: 83 U/L (ref 34–104)
ALT SERPL W P-5'-P-CCNC: 8 U/L (ref 7–52)
ANION GAP SERPL CALCULATED.3IONS-SCNC: 11 MMOL/L (ref 4–13)
AST SERPL W P-5'-P-CCNC: 13 U/L (ref 13–39)
BASOPHILS # BLD AUTO: 0.08 THOUSANDS/ÂΜL (ref 0–0.1)
BASOPHILS NFR BLD AUTO: 1 % (ref 0–1)
BILIRUB SERPL-MCNC: 0.46 MG/DL (ref 0.2–1)
BUN SERPL-MCNC: 30 MG/DL (ref 5–25)
CALCIUM SERPL-MCNC: 9.1 MG/DL (ref 8.4–10.2)
CHLORIDE SERPL-SCNC: 101 MMOL/L (ref 96–108)
CO2 SERPL-SCNC: 25 MMOL/L (ref 21–32)
CREAT SERPL-MCNC: 1.1 MG/DL (ref 0.6–1.3)
EOSINOPHIL # BLD AUTO: 0.19 THOUSAND/ÂΜL (ref 0–0.61)
EOSINOPHIL NFR BLD AUTO: 2 % (ref 0–6)
ERYTHROCYTE [DISTWIDTH] IN BLOOD BY AUTOMATED COUNT: 17.1 % (ref 11.6–15.1)
GFR SERPL CREATININE-BSD FRML MDRD: 69 ML/MIN/1.73SQ M
GLUCOSE P FAST SERPL-MCNC: 93 MG/DL (ref 65–99)
HCT VFR BLD AUTO: 34.9 % (ref 36.5–49.3)
HGB BLD-MCNC: 10.1 G/DL (ref 12–17)
IMM GRANULOCYTES # BLD AUTO: 0.05 THOUSAND/UL (ref 0–0.2)
IMM GRANULOCYTES NFR BLD AUTO: 1 % (ref 0–2)
LYMPHOCYTES # BLD AUTO: 1.58 THOUSANDS/ÂΜL (ref 0.6–4.47)
LYMPHOCYTES NFR BLD AUTO: 17 % (ref 14–44)
MCH RBC QN AUTO: 27.2 PG (ref 26.8–34.3)
MCHC RBC AUTO-ENTMCNC: 28.9 G/DL (ref 31.4–37.4)
MCV RBC AUTO: 94 FL (ref 82–98)
MONOCYTES # BLD AUTO: 0.65 THOUSAND/ÂΜL (ref 0.17–1.22)
MONOCYTES NFR BLD AUTO: 7 % (ref 4–12)
NEUTROPHILS # BLD AUTO: 6.8 THOUSANDS/ÂΜL (ref 1.85–7.62)
NEUTS SEG NFR BLD AUTO: 72 % (ref 43–75)
NRBC BLD AUTO-RTO: 0 /100 WBCS
PLATELET # BLD AUTO: 356 THOUSANDS/UL (ref 149–390)
PMV BLD AUTO: 11.1 FL (ref 8.9–12.7)
POTASSIUM SERPL-SCNC: 4.3 MMOL/L (ref 3.5–5.3)
PROT SERPL-MCNC: 6.6 G/DL (ref 6.4–8.4)
RBC # BLD AUTO: 3.72 MILLION/UL (ref 3.88–5.62)
SODIUM SERPL-SCNC: 137 MMOL/L (ref 135–147)
WBC # BLD AUTO: 9.35 THOUSAND/UL (ref 4.31–10.16)

## 2025-05-08 PROCEDURE — G0157 HHC PT ASSISTANT EA 15: HCPCS

## 2025-05-08 PROCEDURE — 85025 COMPLETE CBC W/AUTO DIFF WBC: CPT

## 2025-05-08 PROCEDURE — 36415 COLL VENOUS BLD VENIPUNCTURE: CPT

## 2025-05-08 PROCEDURE — G0299 HHS/HOSPICE OF RN EA 15 MIN: HCPCS

## 2025-05-08 PROCEDURE — 80053 COMPREHEN METABOLIC PANEL: CPT

## 2025-05-09 ENCOUNTER — APPOINTMENT (EMERGENCY)
Dept: CT IMAGING | Facility: HOSPITAL | Age: 70
DRG: 291 | End: 2025-05-09
Payer: COMMERCIAL

## 2025-05-09 ENCOUNTER — RESULTS FOLLOW-UP (OUTPATIENT)
Dept: FAMILY MEDICINE CLINIC | Facility: CLINIC | Age: 70
End: 2025-05-09

## 2025-05-09 ENCOUNTER — HOSPITAL ENCOUNTER (INPATIENT)
Facility: HOSPITAL | Age: 70
LOS: 2 days | Discharge: HOME HEALTH | DRG: 291 | End: 2025-05-11
Attending: EMERGENCY MEDICINE
Payer: COMMERCIAL

## 2025-05-09 ENCOUNTER — APPOINTMENT (EMERGENCY)
Dept: RADIOLOGY | Facility: HOSPITAL | Age: 70
DRG: 291 | End: 2025-05-09
Payer: COMMERCIAL

## 2025-05-09 VITALS
SYSTOLIC BLOOD PRESSURE: 118 MMHG | HEART RATE: 87 BPM | OXYGEN SATURATION: 97 % | TEMPERATURE: 97.6 F | DIASTOLIC BLOOD PRESSURE: 63 MMHG

## 2025-05-09 DIAGNOSIS — R26.2 AMBULATORY DYSFUNCTION: ICD-10-CM

## 2025-05-09 DIAGNOSIS — I50.33 ACUTE ON CHRONIC DIASTOLIC HEART FAILURE (HCC): ICD-10-CM

## 2025-05-09 DIAGNOSIS — R09.02 HYPOXIA: Primary | ICD-10-CM

## 2025-05-09 DIAGNOSIS — M79.89 LEFT LEG SWELLING: ICD-10-CM

## 2025-05-09 PROBLEM — J96.01 ACUTE HYPOXIC RESPIRATORY FAILURE (HCC): Status: ACTIVE | Noted: 2025-05-09

## 2025-05-09 LAB
2HR DELTA HS TROPONIN: 0 NG/L
ALBUMIN SERPL BCG-MCNC: 3.9 G/DL (ref 3.5–5)
ALP SERPL-CCNC: 85 U/L (ref 34–104)
ALT SERPL W P-5'-P-CCNC: 8 U/L (ref 7–52)
ANION GAP SERPL CALCULATED.3IONS-SCNC: 9 MMOL/L (ref 4–13)
APTT PPP: 31 SECONDS (ref 23–34)
AST SERPL W P-5'-P-CCNC: 10 U/L (ref 13–39)
BACTERIA UR QL AUTO: ABNORMAL /HPF
BASOPHILS # BLD AUTO: 0.06 THOUSANDS/ÂΜL (ref 0–0.1)
BASOPHILS NFR BLD AUTO: 1 % (ref 0–1)
BILIRUB SERPL-MCNC: 0.32 MG/DL (ref 0.2–1)
BILIRUB UR QL STRIP: NEGATIVE
BNP SERPL-MCNC: 436 PG/ML (ref 0–100)
BUN SERPL-MCNC: 28 MG/DL (ref 5–25)
CALCIUM SERPL-MCNC: 9 MG/DL (ref 8.4–10.2)
CARDIAC TROPONIN I PNL SERPL HS: 6 NG/L (ref ?–50)
CARDIAC TROPONIN I PNL SERPL HS: 6 NG/L (ref ?–50)
CHLORIDE SERPL-SCNC: 102 MMOL/L (ref 96–108)
CLARITY UR: CLEAR
CO2 SERPL-SCNC: 26 MMOL/L (ref 21–32)
COLOR UR: YELLOW
CREAT SERPL-MCNC: 1.09 MG/DL (ref 0.6–1.3)
D DIMER PPP FEU-MCNC: 1.98 UG/ML FEU
EOSINOPHIL # BLD AUTO: 0.1 THOUSAND/ÂΜL (ref 0–0.61)
EOSINOPHIL NFR BLD AUTO: 1 % (ref 0–6)
ERYTHROCYTE [DISTWIDTH] IN BLOOD BY AUTOMATED COUNT: 16.7 % (ref 11.6–15.1)
FLUAV RNA RESP QL NAA+PROBE: NEGATIVE
FLUBV RNA RESP QL NAA+PROBE: NEGATIVE
GFR SERPL CREATININE-BSD FRML MDRD: 69 ML/MIN/1.73SQ M
GLUCOSE SERPL-MCNC: 184 MG/DL (ref 65–140)
GLUCOSE UR STRIP-MCNC: ABNORMAL MG/DL
GRAN CASTS #/AREA URNS LPF: ABNORMAL /[LPF]
HCT VFR BLD AUTO: 33.1 % (ref 36.5–49.3)
HGB BLD-MCNC: 9.8 G/DL (ref 12–17)
HGB UR QL STRIP.AUTO: NEGATIVE
IMM GRANULOCYTES # BLD AUTO: 0.07 THOUSAND/UL (ref 0–0.2)
IMM GRANULOCYTES NFR BLD AUTO: 1 % (ref 0–2)
INR PPP: 1.08 (ref 0.85–1.19)
KETONES UR STRIP-MCNC: NEGATIVE MG/DL
LACTATE SERPL-SCNC: 0.9 MMOL/L (ref 0.5–2)
LACTATE SERPL-SCNC: 2.1 MMOL/L (ref 0.5–2)
LEUKOCYTE ESTERASE UR QL STRIP: NEGATIVE
LYMPHOCYTES # BLD AUTO: 0.97 THOUSANDS/ÂΜL (ref 0.6–4.47)
LYMPHOCYTES NFR BLD AUTO: 9 % (ref 14–44)
MAGNESIUM SERPL-MCNC: 1.8 MG/DL (ref 1.9–2.7)
MCH RBC QN AUTO: 27.1 PG (ref 26.8–34.3)
MCHC RBC AUTO-ENTMCNC: 29.6 G/DL (ref 31.4–37.4)
MCV RBC AUTO: 92 FL (ref 82–98)
MONOCYTES # BLD AUTO: 0.6 THOUSAND/ÂΜL (ref 0.17–1.22)
MONOCYTES NFR BLD AUTO: 5 % (ref 4–12)
MUCOUS THREADS UR QL AUTO: ABNORMAL
NEUTROPHILS # BLD AUTO: 9.4 THOUSANDS/ÂΜL (ref 1.85–7.62)
NEUTS SEG NFR BLD AUTO: 83 % (ref 43–75)
NITRITE UR QL STRIP: NEGATIVE
NON-SQ EPI CELLS URNS QL MICRO: ABNORMAL /HPF
NRBC BLD AUTO-RTO: 0 /100 WBCS
PH UR STRIP.AUTO: 5.5 [PH]
PLATELET # BLD AUTO: 337 THOUSANDS/UL (ref 149–390)
PMV BLD AUTO: 9.9 FL (ref 8.9–12.7)
POTASSIUM SERPL-SCNC: 4 MMOL/L (ref 3.5–5.3)
PROCALCITONIN SERPL-MCNC: 0.14 NG/ML
PROT SERPL-MCNC: 7 G/DL (ref 6.4–8.4)
PROT UR STRIP-MCNC: ABNORMAL MG/DL
PROTHROMBIN TIME: 14.6 SECONDS (ref 12.3–15)
RBC # BLD AUTO: 3.61 MILLION/UL (ref 3.88–5.62)
RBC #/AREA URNS AUTO: ABNORMAL /HPF
RSV RNA RESP QL NAA+PROBE: NEGATIVE
SARS-COV-2 RNA RESP QL NAA+PROBE: NEGATIVE
SODIUM SERPL-SCNC: 137 MMOL/L (ref 135–147)
SP GR UR STRIP.AUTO: 1.02 (ref 1–1.03)
UROBILINOGEN UR STRIP-ACNC: <2 MG/DL
WBC # BLD AUTO: 11.2 THOUSAND/UL (ref 4.31–10.16)
WBC #/AREA URNS AUTO: ABNORMAL /HPF

## 2025-05-09 PROCEDURE — 71045 X-RAY EXAM CHEST 1 VIEW: CPT

## 2025-05-09 PROCEDURE — 84145 PROCALCITONIN (PCT): CPT

## 2025-05-09 PROCEDURE — 85379 FIBRIN DEGRADATION QUANT: CPT

## 2025-05-09 PROCEDURE — 85610 PROTHROMBIN TIME: CPT

## 2025-05-09 PROCEDURE — 99285 EMERGENCY DEPT VISIT HI MDM: CPT

## 2025-05-09 PROCEDURE — 96365 THER/PROPH/DIAG IV INF INIT: CPT

## 2025-05-09 PROCEDURE — 99222 1ST HOSP IP/OBS MODERATE 55: CPT

## 2025-05-09 PROCEDURE — 36415 COLL VENOUS BLD VENIPUNCTURE: CPT

## 2025-05-09 PROCEDURE — 94760 N-INVAS EAR/PLS OXIMETRY 1: CPT

## 2025-05-09 PROCEDURE — 71275 CT ANGIOGRAPHY CHEST: CPT

## 2025-05-09 PROCEDURE — 80053 COMPREHEN METABOLIC PANEL: CPT

## 2025-05-09 PROCEDURE — 85730 THROMBOPLASTIN TIME PARTIAL: CPT

## 2025-05-09 PROCEDURE — 84484 ASSAY OF TROPONIN QUANT: CPT

## 2025-05-09 PROCEDURE — 87040 BLOOD CULTURE FOR BACTERIA: CPT

## 2025-05-09 PROCEDURE — 0241U HB NFCT DS VIR RESP RNA 4 TRGT: CPT

## 2025-05-09 PROCEDURE — 83605 ASSAY OF LACTIC ACID: CPT

## 2025-05-09 PROCEDURE — 83880 ASSAY OF NATRIURETIC PEPTIDE: CPT

## 2025-05-09 PROCEDURE — 81001 URINALYSIS AUTO W/SCOPE: CPT

## 2025-05-09 PROCEDURE — 83735 ASSAY OF MAGNESIUM: CPT

## 2025-05-09 PROCEDURE — 85025 COMPLETE CBC W/AUTO DIFF WBC: CPT

## 2025-05-09 RX ORDER — CEFTRIAXONE 2 G/50ML
2000 INJECTION, SOLUTION INTRAVENOUS ONCE
Status: COMPLETED | OUTPATIENT
Start: 2025-05-09 | End: 2025-05-09

## 2025-05-09 RX ORDER — GABAPENTIN 400 MG/1
400 CAPSULE ORAL 2 TIMES DAILY
Status: DISCONTINUED | OUTPATIENT
Start: 2025-05-09 | End: 2025-05-11 | Stop reason: HOSPADM

## 2025-05-09 RX ORDER — TAMSULOSIN HYDROCHLORIDE 0.4 MG/1
0.4 CAPSULE ORAL
Status: DISCONTINUED | OUTPATIENT
Start: 2025-05-10 | End: 2025-05-11 | Stop reason: HOSPADM

## 2025-05-09 RX ORDER — ESCITALOPRAM OXALATE 20 MG/1
20 TABLET ORAL DAILY
Status: DISCONTINUED | OUTPATIENT
Start: 2025-05-10 | End: 2025-05-11 | Stop reason: HOSPADM

## 2025-05-09 RX ORDER — SENNOSIDES 8.6 MG
8.6 TABLET ORAL 2 TIMES DAILY
Status: DISCONTINUED | OUTPATIENT
Start: 2025-05-09 | End: 2025-05-11 | Stop reason: HOSPADM

## 2025-05-09 RX ORDER — INSULIN LISPRO 100 [IU]/ML
1-6 INJECTION, SOLUTION INTRAVENOUS; SUBCUTANEOUS
Status: DISCONTINUED | OUTPATIENT
Start: 2025-05-10 | End: 2025-05-11 | Stop reason: HOSPADM

## 2025-05-09 RX ORDER — PRAVASTATIN SODIUM 20 MG
20 TABLET ORAL
Status: DISCONTINUED | OUTPATIENT
Start: 2025-05-10 | End: 2025-05-11 | Stop reason: HOSPADM

## 2025-05-09 RX ORDER — ENOXAPARIN SODIUM 100 MG/ML
40 INJECTION SUBCUTANEOUS DAILY
Status: DISCONTINUED | OUTPATIENT
Start: 2025-05-10 | End: 2025-05-11 | Stop reason: HOSPADM

## 2025-05-09 RX ORDER — PANTOPRAZOLE SODIUM 40 MG/1
40 TABLET, DELAYED RELEASE ORAL 2 TIMES DAILY
Status: DISCONTINUED | OUTPATIENT
Start: 2025-05-09 | End: 2025-05-11 | Stop reason: HOSPADM

## 2025-05-09 RX ORDER — CLONAZEPAM 1 MG/1
1 TABLET ORAL 2 TIMES DAILY
Status: DISCONTINUED | OUTPATIENT
Start: 2025-05-09 | End: 2025-05-11 | Stop reason: HOSPADM

## 2025-05-09 RX ORDER — NIFEDIPINE 30 MG/1
90 TABLET, EXTENDED RELEASE ORAL DAILY
Status: DISCONTINUED | OUTPATIENT
Start: 2025-05-10 | End: 2025-05-11 | Stop reason: HOSPADM

## 2025-05-09 RX ORDER — METOCLOPRAMIDE 10 MG/1
10 TABLET ORAL
Status: DISCONTINUED | OUTPATIENT
Start: 2025-05-10 | End: 2025-05-11 | Stop reason: HOSPADM

## 2025-05-09 RX ORDER — BUSPIRONE HYDROCHLORIDE 5 MG/1
5 TABLET ORAL 2 TIMES DAILY
Status: DISCONTINUED | OUTPATIENT
Start: 2025-05-09 | End: 2025-05-11 | Stop reason: HOSPADM

## 2025-05-09 RX ORDER — MIRTAZAPINE 15 MG/1
45 TABLET, FILM COATED ORAL
Status: DISCONTINUED | OUTPATIENT
Start: 2025-05-09 | End: 2025-05-11 | Stop reason: HOSPADM

## 2025-05-09 RX ORDER — ACETAMINOPHEN 325 MG/1
325 TABLET ORAL EVERY 8 HOURS PRN
Status: DISCONTINUED | OUTPATIENT
Start: 2025-05-09 | End: 2025-05-11 | Stop reason: HOSPADM

## 2025-05-09 RX ORDER — CYCLOBENZAPRINE HCL 5 MG
5 TABLET ORAL 3 TIMES DAILY PRN
Status: DISCONTINUED | OUTPATIENT
Start: 2025-05-09 | End: 2025-05-11 | Stop reason: HOSPADM

## 2025-05-09 RX ORDER — FUROSEMIDE 10 MG/ML
40 INJECTION INTRAMUSCULAR; INTRAVENOUS
Status: DISCONTINUED | OUTPATIENT
Start: 2025-05-09 | End: 2025-05-10

## 2025-05-09 RX ORDER — MAGNESIUM SULFATE HEPTAHYDRATE 40 MG/ML
2 INJECTION, SOLUTION INTRAVENOUS ONCE
Status: COMPLETED | OUTPATIENT
Start: 2025-05-09 | End: 2025-05-09

## 2025-05-09 RX ORDER — METOPROLOL SUCCINATE 50 MG/1
50 TABLET, EXTENDED RELEASE ORAL 2 TIMES DAILY
Status: DISCONTINUED | OUTPATIENT
Start: 2025-05-09 | End: 2025-05-11 | Stop reason: HOSPADM

## 2025-05-09 RX ORDER — FINASTERIDE 5 MG/1
5 TABLET, FILM COATED ORAL DAILY
Status: DISCONTINUED | OUTPATIENT
Start: 2025-05-10 | End: 2025-05-11 | Stop reason: HOSPADM

## 2025-05-09 RX ORDER — DOXYCYCLINE 100 MG/1
100 CAPSULE ORAL 2 TIMES DAILY
Status: DISCONTINUED | OUTPATIENT
Start: 2025-05-09 | End: 2025-05-11 | Stop reason: HOSPADM

## 2025-05-09 RX ORDER — POLYETHYLENE GLYCOL 3350 17 G/17G
17 POWDER, FOR SOLUTION ORAL DAILY
Status: DISCONTINUED | OUTPATIENT
Start: 2025-05-10 | End: 2025-05-11 | Stop reason: HOSPADM

## 2025-05-09 RX ORDER — NIFEDIPINE 30 MG/1
30 TABLET, EXTENDED RELEASE ORAL DAILY
Status: DISCONTINUED | OUTPATIENT
Start: 2025-05-10 | End: 2025-05-11 | Stop reason: HOSPADM

## 2025-05-09 RX ORDER — FOLIC ACID 1 MG/1
2000 TABLET ORAL DAILY
Status: DISCONTINUED | OUTPATIENT
Start: 2025-05-10 | End: 2025-05-11 | Stop reason: HOSPADM

## 2025-05-09 RX ADMIN — SENNOSIDES 8.6 MG: 8.6 TABLET, FILM COATED ORAL at 23:02

## 2025-05-09 RX ADMIN — BUSPIRONE HYDROCHLORIDE 5 MG: 5 TABLET ORAL at 23:01

## 2025-05-09 RX ADMIN — IOHEXOL 100 ML: 350 INJECTION, SOLUTION INTRAVENOUS at 19:53

## 2025-05-09 RX ADMIN — MIRTAZAPINE 45 MG: 15 TABLET, FILM COATED ORAL at 23:01

## 2025-05-09 RX ADMIN — CLONAZEPAM 1 MG: 1 TABLET ORAL at 23:01

## 2025-05-09 RX ADMIN — GABAPENTIN 400 MG: 400 CAPSULE ORAL at 23:01

## 2025-05-09 RX ADMIN — AZITHROMYCIN MONOHYDRATE 500 MG: 500 INJECTION, POWDER, LYOPHILIZED, FOR SOLUTION INTRAVENOUS at 21:10

## 2025-05-09 RX ADMIN — PANTOPRAZOLE SODIUM 40 MG: 40 TABLET, DELAYED RELEASE ORAL at 23:01

## 2025-05-09 RX ADMIN — FUROSEMIDE 40 MG: 10 INJECTION, SOLUTION INTRAVENOUS at 23:10

## 2025-05-09 RX ADMIN — DOXYCYCLINE 100 MG: 100 CAPSULE ORAL at 23:01

## 2025-05-09 RX ADMIN — METOPROLOL SUCCINATE 50 MG: 50 TABLET, EXTENDED RELEASE ORAL at 23:01

## 2025-05-09 RX ADMIN — MAGNESIUM SULFATE HEPTAHYDRATE 2 G: 40 INJECTION, SOLUTION INTRAVENOUS at 19:25

## 2025-05-09 RX ADMIN — ACETAMINOPHEN 325 MG: 325 TABLET, FILM COATED ORAL at 23:01

## 2025-05-09 RX ADMIN — CEFTRIAXONE 2000 MG: 2 INJECTION, SOLUTION INTRAVENOUS at 21:10

## 2025-05-10 PROBLEM — R65.10 SIRS (SYSTEMIC INFLAMMATORY RESPONSE SYNDROME) (HCC): Status: RESOLVED | Noted: 2024-10-06 | Resolved: 2025-05-10

## 2025-05-10 PROBLEM — I50.33 ACUTE ON CHRONIC DIASTOLIC HEART FAILURE (HCC): Status: ACTIVE | Noted: 2025-05-10

## 2025-05-10 LAB
ANION GAP SERPL CALCULATED.3IONS-SCNC: 10 MMOL/L (ref 4–13)
BUN SERPL-MCNC: 23 MG/DL (ref 5–25)
CALCIUM SERPL-MCNC: 9.1 MG/DL (ref 8.4–10.2)
CHLORIDE SERPL-SCNC: 102 MMOL/L (ref 96–108)
CO2 SERPL-SCNC: 28 MMOL/L (ref 21–32)
CREAT SERPL-MCNC: 0.97 MG/DL (ref 0.6–1.3)
ERYTHROCYTE [DISTWIDTH] IN BLOOD BY AUTOMATED COUNT: 17.1 % (ref 11.6–15.1)
GFR SERPL CREATININE-BSD FRML MDRD: 80 ML/MIN/1.73SQ M
GLUCOSE SERPL-MCNC: 105 MG/DL (ref 65–140)
GLUCOSE SERPL-MCNC: 131 MG/DL (ref 65–140)
GLUCOSE SERPL-MCNC: 169 MG/DL (ref 65–140)
GLUCOSE SERPL-MCNC: 90 MG/DL (ref 65–140)
HCT VFR BLD AUTO: 34.2 % (ref 36.5–49.3)
HGB BLD-MCNC: 10.5 G/DL (ref 12–17)
MAGNESIUM SERPL-MCNC: 2 MG/DL (ref 1.9–2.7)
MCH RBC QN AUTO: 27.6 PG (ref 26.8–34.3)
MCHC RBC AUTO-ENTMCNC: 30.7 G/DL (ref 31.4–37.4)
MCV RBC AUTO: 90 FL (ref 82–98)
PLATELET # BLD AUTO: 338 THOUSANDS/UL (ref 149–390)
PMV BLD AUTO: 10.2 FL (ref 8.9–12.7)
POTASSIUM SERPL-SCNC: 3.8 MMOL/L (ref 3.5–5.3)
RBC # BLD AUTO: 3.8 MILLION/UL (ref 3.88–5.62)
SODIUM SERPL-SCNC: 140 MMOL/L (ref 135–147)
WBC # BLD AUTO: 8.3 THOUSAND/UL (ref 4.31–10.16)

## 2025-05-10 PROCEDURE — 83735 ASSAY OF MAGNESIUM: CPT | Performed by: INTERNAL MEDICINE

## 2025-05-10 PROCEDURE — 80048 BASIC METABOLIC PNL TOTAL CA: CPT | Performed by: INTERNAL MEDICINE

## 2025-05-10 PROCEDURE — 97166 OT EVAL MOD COMPLEX 45 MIN: CPT

## 2025-05-10 PROCEDURE — 99232 SBSQ HOSP IP/OBS MODERATE 35: CPT | Performed by: INTERNAL MEDICINE

## 2025-05-10 PROCEDURE — 94760 N-INVAS EAR/PLS OXIMETRY 1: CPT

## 2025-05-10 PROCEDURE — 94762 N-INVAS EAR/PLS OXIMTRY CONT: CPT

## 2025-05-10 PROCEDURE — 85027 COMPLETE CBC AUTOMATED: CPT | Performed by: INTERNAL MEDICINE

## 2025-05-10 PROCEDURE — 82948 REAGENT STRIP/BLOOD GLUCOSE: CPT

## 2025-05-10 RX ORDER — FUROSEMIDE 10 MG/ML
20 INJECTION INTRAMUSCULAR; INTRAVENOUS ONCE
Status: DISCONTINUED | OUTPATIENT
Start: 2025-05-10 | End: 2025-05-10

## 2025-05-10 RX ORDER — GUAIFENESIN 600 MG/1
600 TABLET, EXTENDED RELEASE ORAL EVERY 12 HOURS SCHEDULED
Status: DISCONTINUED | OUTPATIENT
Start: 2025-05-10 | End: 2025-05-11 | Stop reason: HOSPADM

## 2025-05-10 RX ORDER — FUROSEMIDE 10 MG/ML
40 INJECTION INTRAMUSCULAR; INTRAVENOUS ONCE
Status: COMPLETED | OUTPATIENT
Start: 2025-05-10 | End: 2025-05-10

## 2025-05-10 RX ADMIN — GUAIFENESIN 600 MG: 600 TABLET ORAL at 10:02

## 2025-05-10 RX ADMIN — CYCLOBENZAPRINE HYDROCHLORIDE 5 MG: 5 TABLET, FILM COATED ORAL at 03:47

## 2025-05-10 RX ADMIN — INSULIN LISPRO 1 UNITS: 100 INJECTION, SOLUTION INTRAVENOUS; SUBCUTANEOUS at 12:18

## 2025-05-10 RX ADMIN — DOXYCYCLINE 100 MG: 100 CAPSULE ORAL at 09:12

## 2025-05-10 RX ADMIN — FUROSEMIDE 40 MG: 10 INJECTION, SOLUTION INTRAVENOUS at 16:29

## 2025-05-10 RX ADMIN — GABAPENTIN 400 MG: 400 CAPSULE ORAL at 21:21

## 2025-05-10 RX ADMIN — ESCITALOPRAM OXALATE 20 MG: 20 TABLET ORAL at 09:13

## 2025-05-10 RX ADMIN — METOPROLOL SUCCINATE 50 MG: 50 TABLET, EXTENDED RELEASE ORAL at 21:19

## 2025-05-10 RX ADMIN — FUROSEMIDE 40 MG: 10 INJECTION, SOLUTION INTRAVENOUS at 09:14

## 2025-05-10 RX ADMIN — DOXYCYCLINE 100 MG: 100 CAPSULE ORAL at 18:56

## 2025-05-10 RX ADMIN — PANTOPRAZOLE SODIUM 40 MG: 40 TABLET, DELAYED RELEASE ORAL at 21:19

## 2025-05-10 RX ADMIN — POLYETHYLENE GLYCOL 3350 17 G: 17 POWDER, FOR SOLUTION ORAL at 12:18

## 2025-05-10 RX ADMIN — GABAPENTIN 400 MG: 400 CAPSULE ORAL at 09:12

## 2025-05-10 RX ADMIN — PRAVASTATIN SODIUM 20 MG: 20 TABLET ORAL at 16:30

## 2025-05-10 RX ADMIN — METOCLOPRAMIDE 10 MG: 10 TABLET ORAL at 16:30

## 2025-05-10 RX ADMIN — NIFEDIPINE 30 MG: 30 TABLET, FILM COATED, EXTENDED RELEASE ORAL at 09:13

## 2025-05-10 RX ADMIN — CLONAZEPAM 1 MG: 1 TABLET ORAL at 18:56

## 2025-05-10 RX ADMIN — OXYCODONE HYDROCHLORIDE 15 MG: 5 TABLET ORAL at 19:22

## 2025-05-10 RX ADMIN — GUAIFENESIN 600 MG: 600 TABLET ORAL at 00:21

## 2025-05-10 RX ADMIN — SENNOSIDES 8.6 MG: 8.6 TABLET, FILM COATED ORAL at 09:12

## 2025-05-10 RX ADMIN — GUAIFENESIN 600 MG: 600 TABLET ORAL at 21:19

## 2025-05-10 RX ADMIN — FOLIC ACID 2000 MCG: 1 TABLET ORAL at 09:13

## 2025-05-10 RX ADMIN — OXYCODONE HYDROCHLORIDE 15 MG: 5 TABLET ORAL at 01:04

## 2025-05-10 RX ADMIN — ENOXAPARIN SODIUM 40 MG: 40 INJECTION SUBCUTANEOUS at 09:14

## 2025-05-10 RX ADMIN — BUSPIRONE HYDROCHLORIDE 5 MG: 5 TABLET ORAL at 09:12

## 2025-05-10 RX ADMIN — PANTOPRAZOLE SODIUM 40 MG: 40 TABLET, DELAYED RELEASE ORAL at 09:12

## 2025-05-10 RX ADMIN — TAMSULOSIN HYDROCHLORIDE 0.4 MG: 0.4 CAPSULE ORAL at 16:29

## 2025-05-10 RX ADMIN — MIRTAZAPINE 45 MG: 15 TABLET, FILM COATED ORAL at 21:19

## 2025-05-10 RX ADMIN — METOPROLOL SUCCINATE 50 MG: 50 TABLET, EXTENDED RELEASE ORAL at 09:13

## 2025-05-10 RX ADMIN — BUSPIRONE HYDROCHLORIDE 5 MG: 5 TABLET ORAL at 18:56

## 2025-05-10 RX ADMIN — OXYCODONE HYDROCHLORIDE 15 MG: 5 TABLET ORAL at 23:04

## 2025-05-10 RX ADMIN — CLONAZEPAM 1 MG: 1 TABLET ORAL at 09:13

## 2025-05-10 RX ADMIN — METOCLOPRAMIDE 10 MG: 10 TABLET ORAL at 05:36

## 2025-05-10 RX ADMIN — FINASTERIDE 5 MG: 5 TABLET, FILM COATED ORAL at 09:13

## 2025-05-10 RX ADMIN — NIFEDIPINE 90 MG: 30 TABLET, FILM COATED, EXTENDED RELEASE ORAL at 09:12

## 2025-05-10 NOTE — ASSESSMENT & PLAN NOTE
"Lab Results   Component Value Date    HGBA1C 5.8 (H) 03/11/2025       No results for input(s): \"POCGLU\" in the last 72 hours.    Blood Sugar Average: Last 72 hrs:  -Well-controlled on home regimen including Jardiance 10, metformin 1000 daily  Monitor on SSI and-Controlled diet    "

## 2025-05-10 NOTE — ASSESSMENT & PLAN NOTE
- Presented meeting SIRS criteria with tachypnea respiratory rate 20, white count of 11.2  - Blood cultures drawn on arrival, given ceftriaxone azithromycin concern for possible pneumonia  - Initial lactate of 2.1 repeat of 0.9  - Pro-Aj of 0.14  - UA showed no nitrates leukocytes, WBCs  - Leukocytosis of 11  -CT chest findings likely consistent with fluid overload versus viral infection    -Will hold off on any fluids given concern for CHF exacerbation causing presenting symptoms  - Will monitor off antibiotics as no obvious signs of infection  - Continue monitoring white count and fever curve  - Will reassess in 24 hours of ceftriaxone is to be restarted, continue home doxycycline

## 2025-05-10 NOTE — PROGRESS NOTES
Progress Note - Hospitalist   Name: Juan San 69 y.o. male I MRN: [id number]  Unit/Bed#: -01 I Date of Admission: 5/9/2025   Date of Service: 5/10/2025 I Hospital Day: 1    Assessment & Plan  Acute hypoxic respiratory failure (HCC)  Presented with oxygen level of 70%, was placed on nasal cannula oxygen  Likely in the setting of acute on chronic CHF  This morning patient saturating at 97% on 2 L nasal cannula oxygen  Wean off to maintain saturation greater than 90%  Diabetic peripheral neuropathy (HCC)  Lab Results   Component Value Date    HGBA1C 5.8 (H) 03/11/2025       Recent Labs     05/10/25  0726 05/10/25  1137   POCGLU 90 169*       Blood Sugar Average: Last 72 hrs:  (P) 129.5  Continue home gabapentin  Continue SSI  Lumbar spondylosis  s/p T12-S1 fusion (Dr. Moraes 4/11/2023), c/b L L5-S1 disc/abscess washout 6/14/24   L5-S1 ALIF and redo T10-P fusion with Dr. Call on 3/21/25   Continue home pain regimen oxycodone 15 every 4 hours as needed  Continue home doxycycline 100 twice daily  PT OT  Thyroid cancer (HCC)   Status post partial thyroidectomy  Anxiety and depression  Continue home regimen BuSpar 5 twice daily, Klonopin 1 twice daily, Lexapro, Remeron 45  Hypertension  Home regimen includes losartan hydrochlorothiazide 100-25 combo medication however patient states is not taking  Will continue rest of home regimen including metoprolol succinate 50 twice daily, Procardia  daily  Type 2 diabetes mellitus with hyperglycemia, without long-term current use of insulin (HCC)  Lab Results   Component Value Date    HGBA1C 5.8 (H) 03/11/2025       Recent Labs     05/10/25  0726 05/10/25  1137   POCGLU 90 169*       Blood Sugar Average: Last 72 hrs:  (P) 129.5  Well-controlled on home regimen including Jardiance 10, metformin 1000 daily  Monitor on SSI and-Controlled diet    SIRS (systemic inflammatory response syndrome) (HCC) (Resolved: 5/10/2025)  Presented with tachypnea, tachycardia   Most likely  in the setting of CHF.  CT chest showed CHF versus may be inflammatory pathology however unlikely patient has pneumonia.  SIRS now resolved.  Patient on doxycycline for the disc abscess    Acute on chronic diastolic heart failure (HCC)  Wt Readings from Last 3 Encounters:   05/10/25 88 kg (194 lb 0.1 oz)   04/29/25 76.7 kg (169 lb 3.2 oz)   04/23/25 79.5 kg (175 lb 4 oz)     Presented with shortness of breath  CT chest showed interstitial and alveolar pulmonary edema with moderate bilateral pleural effusions  BNP mildly elevated at 436  Troponin negative, no CP   Patient was placed on nasal cannula oxygen given saturation in the 70s  Last echo 7/15/2024 showed 65% ejection fraction with diastolic dysfunction 1.  Patient has history of lower extremity edema and had Lasix as needed however he did not take any.   Started on Lasix 40 mg IV twice daily, so far had very good urine output and his oxygen saturation much improved.  Continue 40 IV Lasix twice daily today only.  Likely transition to oral diuretics tomorrow  Intake/output, daily weights  Patient would like to be discharged tomorrow.      VTE Pharmacologic Prophylaxis: VTE Score: 6 High Risk (Score >/= 5) - Pharmacological DVT Prophylaxis Ordered: enoxaparin (Lovenox). Sequential Compression Devices Ordered.    Mobility:   Basic Mobility Inpatient Raw Score: 16  JH-HLM Goal: 5: Stand one or more mins  JH-HLM Achieved: 5: Stand (1 or more minutes)  JH-HLM Goal achieved. Continue to encourage appropriate mobility.    Patient Centered Rounds: I performed bedside rounds with nursing staff today.   Discussions with Specialists or Other Care Team Provider:     Education and Discussions with Family / Patient: Updated  (wife) via phone.    Current Length of Stay: 1 day(s)  Current Patient Status: Inpatient   Certification Statement: The patient will continue to require additional inpatient hospital stay due to chf  Discharge Plan: Anticipate discharge  tomorrow to home.    Code Status: Level 1 - Full Code    Subjective     Is feeling better, no sob, no chest pain.     Objective :  Temp:  [97.7 °F (36.5 °C)-98.2 °F (36.8 °C)] 97.7 °F (36.5 °C)  HR:  [76-93] 88  BP: (126-157)/() 156/79  Resp:  [18-20] 18  SpO2:  [70 %-97 %] 96 %  O2 Device: Nasal cannula  Nasal Cannula O2 Flow Rate (L/min):  [2 L/min-10 L/min] 2 L/min    Body mass index is 29.5 kg/m².     Input and Output Summary (last 24 hours):     Intake/Output Summary (Last 24 hours) at 5/10/2025 1404  Last data filed at 5/10/2025 1350  Gross per 24 hour   Intake 1180 ml   Output 6900 ml   Net -5720 ml       Physical Exam  Vitals and nursing note reviewed.   Constitutional:       General: He is not in acute distress.     Appearance: He is not diaphoretic.   HENT:      Head: Normocephalic.   Eyes:      General:         Right eye: No discharge.         Left eye: No discharge.   Cardiovascular:      Rate and Rhythm: Normal rate and regular rhythm.   Pulmonary:      Effort: Pulmonary effort is normal. No respiratory distress.      Breath sounds: No wheezing, rhonchi or rales.   Abdominal:      General: There is no distension.      Palpations: Abdomen is soft.      Tenderness: There is no abdominal tenderness. There is no guarding or rebound.   Musculoskeletal:      Cervical back: Normal range of motion.      Right lower leg: Edema (trace) present.      Left lower leg: Edema (trace) present.   Skin:     General: Skin is warm.   Neurological:      Mental Status: He is alert and oriented to person, place, and time.   Psychiatric:         Mood and Affect: Mood normal.         Behavior: Behavior normal.         Thought Content: Thought content normal.         Judgment: Judgment normal.           Lines/Drains:              Lab Results: I have reviewed the following results:   Results from last 7 days   Lab Units 05/10/25  0357 05/09/25  1837   WBC Thousand/uL 8.30 11.20*   HEMOGLOBIN g/dL 10.5* 9.8*   HEMATOCRIT %  34.2* 33.1*   PLATELETS Thousands/uL 338 337   SEGS PCT %  --  83*   LYMPHO PCT %  --  9*   MONO PCT %  --  5   EOS PCT %  --  1     Results from last 7 days   Lab Units 05/10/25  0357 05/09/25  1837   SODIUM mmol/L 140 137   POTASSIUM mmol/L 3.8 4.0   CHLORIDE mmol/L 102 102   CO2 mmol/L 28 26   BUN mg/dL 23 28*   CREATININE mg/dL 0.97 1.09   ANION GAP mmol/L 10 9   CALCIUM mg/dL 9.1 9.0   ALBUMIN g/dL  --  3.9   TOTAL BILIRUBIN mg/dL  --  0.32   ALK PHOS U/L  --  85   ALT U/L  --  8   AST U/L  --  10*   GLUCOSE RANDOM mg/dL 105 184*     Results from last 7 days   Lab Units 05/09/25  1837   INR  1.08     Results from last 7 days   Lab Units 05/10/25  1137 05/10/25  0726   POC GLUCOSE mg/dl 169* 90         Results from last 7 days   Lab Units 05/09/25  2036 05/09/25  1837   LACTIC ACID mmol/L 0.9 2.1*   PROCALCITONIN ng/ml  --  0.14       Recent Cultures (last 7 days):   Results from last 7 days   Lab Units 05/09/25  1926 05/09/25  1837   BLOOD CULTURE  Received in Microbiology Lab. Culture in Progress. Received in Microbiology Lab. Culture in Progress.       Imaging Results Review: I reviewed radiology reports from this admission including: chest xray.  Other Study Results Review: EKG was reviewed.  EKG was personally reviewed and my interpretation is: Personally Reviewed..    Last 24 Hours Medication List:     Current Facility-Administered Medications:     acetaminophen (TYLENOL) tablet 325 mg, Q8H PRN    busPIRone (BUSPAR) tablet 5 mg, BID    clonazePAM (KlonoPIN) tablet 1 mg, BID    cyclobenzaprine (FLEXERIL) tablet 5 mg, TID PRN    doxycycline hyclate (VIBRAMYCIN) capsule 100 mg, BID    enoxaparin (LOVENOX) subcutaneous injection 40 mg, Daily    escitalopram (LEXAPRO) tablet 20 mg, Daily    finasteride (PROSCAR) tablet 5 mg, Daily    folic acid (FOLVITE) tablet 2,000 mcg, Daily    furosemide (LASIX) injection 40 mg, Once    gabapentin (NEURONTIN) capsule 400 mg, BID    guaiFENesin (MUCINEX) 12 hr tablet 600 mg,  Q12H CHRISTIE    insulin lispro (HumALOG/ADMELOG) 100 units/mL subcutaneous injection 1-6 Units, TID AC **AND** Fingerstick Glucose (POCT), TID AC    metoclopramide (REGLAN) tablet 10 mg, BID AC    metoprolol succinate (TOPROL-XL) 24 hr tablet 50 mg, BID    mirtazapine (REMERON) tablet 45 mg, HS    NIFEdipine (PROCARDIA XL) 24 hr tablet 30 mg, Daily    NIFEdipine (PROCARDIA XL) 24 hr tablet 90 mg, Daily    oxyCODONE (ROXICODONE) IR tablet 15 mg, Q4H PRN    pantoprazole (PROTONIX) EC tablet 40 mg, BID    polyethylene glycol (MIRALAX) packet 17 g, Daily    pravastatin (PRAVACHOL) tablet 20 mg, Daily With Dinner    senna (SENOKOT) tablet 8.6 mg, BID    tamsulosin (FLOMAX) capsule 0.4 mg, Daily With Dinner    Administrative Statements   Today, Patient Was Seen By: Leah He MD      **Please Note: This note may have been constructed using a voice recognition system.**

## 2025-05-10 NOTE — CASE MANAGEMENT
Case Management Assessment & Discharge Planning Note    Patient name Juan San  Location /-01 MRN [id number]  : 1955 Date 5/10/2025       Current Admission Date: 2025  Current Admission Diagnosis:Acute hypoxic respiratory failure (HCC)   Patient Active Problem List    Diagnosis Date Noted Date Diagnosed    Acute on chronic diastolic heart failure (HCC) 05/10/2025     Acute hypoxic respiratory failure (HCC) 2025     Wound infection 2025     Esophagitis 2025     PONV (postoperative nausea and vomiting)      Chronic diarrhea 2025     Decreased oral intake 2025     Bipolar II disorder (HCC) 2024     Elevated troponin I level 10/06/2024     Ambulatory dysfunction 10/06/2024     Hypokalemia 10/06/2024     Metabolic acidosis, increased anion gap 10/06/2024     Prostatitis 2024     Lower extremity edema 2024     Venous stasis ulcers ([facility]) 2024     Acute kidney injury (HCC) 2024     Failure of joint fusion ([facility]) 2024     Lactic acidosis 2024     Type 2 diabetes mellitus with hyperglycemia, without long-term current use of insulin ([facility]) 2024     Foraminal stenosis of lumbar region 2024     Discitis of lumbosacral region 2024     Iron deficiency anemia 10/10/2023     Acute on chronic bilateral low back pain with sciatica 10/09/2023     Anxiety and depression 10/09/2023     Hypertension 10/09/2023     Benign prostatic hyperplasia with urinary frequency 2023     Scrotal pain 2023     Lower urinary tract symptoms 2023     Status post lumbar spinal fusion 2023     Hyponatremia 2023     Gallstones 2023     Anemia 2023     Urinary retention 2023     Nausea and vomiting      Medical marijuana use      Chronic bilateral low back pain without sciatica 2023     Lumbar radiculopathy      Chronic pain syndrome 2022     Liver disease 08/10/2022      Hypertensive urgency 08/09/2022     Bronchitis, mucopurulent recurrent (HCC) 07/29/2022     Cirrhosis, alcoholic (HCC) 07/29/2022     Diabetic peripheral neuropathy (HCC) 07/29/2022     Herniated nucleus pulposus of lumbosacral region 07/29/2022     Thyroid cancer (HCC) 08/19/2021     Alcoholism in remission (HCC) 07/30/2021     Benzodiazepine dependence (HCC) 07/30/2021     Bilateral adhesive capsulitis of shoulders 07/30/2021     DM (diabetes mellitus) (HCC) 07/30/2021     Hypercholesterolemia 07/30/2021     Lumbar spondylosis 07/30/2021       LOS (days): 1  Geometric Mean LOS (GMLOS) (days): 4  Days to GMLOS:3.3     OBJECTIVE:    Risk of Unplanned Readmission Score: 47.39         Current admission status: Inpatient       Preferred Pharmacy:   [facility] [address] [place] [address] [address] [facility]  [address] [facility] [address]  Phone: [phone] Fax: [phone]    [facility] - [place], [place] - [address]  [address]  [place] [address]  Phone: [phone] Fax: [phone]    Primary Care Provider: RUIZ Hanks    Primary Insurance: [facility]  Secondary Insurance: [facility]    ASSESSMENT:  Active Health Care Proxies       Tatyana San [facility] Care Representative - Spouse   Primary Phone: [phone] (Mobile)                                Patient Information  Admitted from:: Home  Mental Status: Alert  During Assessment patient was accompanied by: Not accompanied during assessment  Assessment information provided by:: Patient  Primary Caregiver: Self  Support Systems: Self, Spouse/significant other  County of Residence: [place]  What city do you live in?: [place]  Home entry access options. Select all that apply.: Stairs  Number of steps to enter home.: 2  Do the steps have railings?: No  Type of Current Residence: [facility]  Living Arrangements: Lives w/ Spouse/significant other  Is patient a  ?: Yes  Is patient active with [facility] ([employer])?: No    Activities of Daily Living Prior to Admission  Functional Status: Independent  Completes ADLs independently?: Yes  Ambulates independently?: Yes  Does patient use assisted devices?: Yes  Assisted Devices (DME) used: Walker  Does patient currently own DME?: Yes  What DME does the patient currently own?: Walker  Does patient have a history of Outpatient Therapy (PT/OT)?: No  Does the patient have a history of Short-Term Rehab?: Yes  Does patient have a history of HHC?: Yes  Does patient currently have HHC?: Yes    Current Home Health Care  Type of Current Home Care Services: Nurse visit, Home PT, Home health aide  Home Health Agency Name:: [facility]  Current Home Health Follow-Up Provider:: PCP    Patient Information Continued  Income Source: Pension/intermediate  Does patient have prescription coverage?: Yes  Can the patient afford their medications and any related supplies (such as glucometers or test strips)?: Yes  Does patient receive dialysis treatments?: No  Does patient have a history of substance abuse?: No  Does patient have a history of Mental Health Diagnosis?: No         Means of Transportation  Means of Transport to Appts:: Family transport          DISCHARGE DETAILS:    Discharge planning discussed with:: Pt's wife  Freedom of Choice: Yes     CM contacted family/caregiver?: Yes  Were Treatment Team discharge recommendations reviewed with patient/caregiver?: Yes  Did patient/caregiver verbalize understanding of patient care needs?: Yes  Were patient/caregiver advised of the risks associated with not following Treatment Team discharge recommendations?: Yes    Contacts  Patient Contacts: Tatyana San, wife  Relationship to Patient:: Family  Contact Method: Phone  Phone Number: [phone]  Reason/Outcome: Continuity of Care, Emergency Contact, Discharge Planning    Requested Home Health Care         Is the patient interested in HHC at  discharge?: Yes  Home Health Discipline requested:: Nursing, Occupational Therapy, Physical Therapy  Home Health Agency Name:: [facility]  HHA External Referral Reason (only applicable if external HHA name selected): Patient has established relationship with provider  Home Health Follow-Up Provider:: PCP  Home Health Services Needed:: Evaluate Functional Status and Safety, Gait/ADL Training, Heart Failure Management  Oxygen LPM Ordered (if applicable based on home health services needed):: 2 LPM  Homebound Criteria Met:: Uses an Assist Device (i.e. cane, walker, etc)  Supporting Clincal Findings:: Requires Oxygen                                                                Additional Comments: Spoke with the pt's wife to review CM role and possible discharge planning needs. Pt lives with his wife in an [facility] 2 steps to enter. Pt uses a walker to ambulate and does need some assistance with his ADLs. Pt has been active with [facility] for RN PT OT and has a hx at [facility] for rehab. Pt has no hx for D&A or  Inpt stays. Reviewed the PT OT Recommendation of HHC with the pt's wife. Pt's wife agreeable to resume [facility] for care. CM placed updated order for [facility] in [facility]. Pt is on 4L of O2 and has no O2 prior to admission. Pt's wife to provide transport to home.

## 2025-05-10 NOTE — ASSESSMENT & PLAN NOTE
Wt Readings from Last 3 Encounters:   05/10/25 88 kg (194 lb 0.1 oz)   04/29/25 76.7 kg (169 lb 3.2 oz)   04/23/25 79.5 kg (175 lb 4 oz)     Presented with shortness of breath  CT chest showed interstitial and alveolar pulmonary edema with moderate bilateral pleural effusions  BNP mildly elevated at 436  Troponin negative, no CP   Patient was placed on nasal cannula oxygen given saturation in the 70s  Last echo 7/15/2024 showed 65% ejection fraction with diastolic dysfunction 1.  Patient has history of lower extremity edema and had Lasix as needed however he did not take any.   Started on Lasix 40 mg IV twice daily, so far had very good urine output and his oxygen saturation much improved.  Continue 40 IV Lasix twice daily today only.  Likely transition to oral diuretics tomorrow  Intake/output, daily weights  Patient would like to be discharged tomorrow.

## 2025-05-10 NOTE — ASSESSMENT & PLAN NOTE
-Presented with 2 days of shortness of breath requiring 8 L mid flow on arrival  - CT shows no PE findings of interstitial and alveolar pulmonary edema, compressive atelectasis with possible superimposed infection  - Reports lower extreme edema since surgery in March, recommend outpatient take Lasix for leg swelling however has not been taking, previous hospitalization for lower extremity edema as well  -Based on CT findings and findings of fluid overload on exam concerning more for CHF exacerbation  - Last echo 7/2024 showed EF 65% grade 1 diastolic dysfunction  -BNP elevated to 436  -Leukocytosis of 11.2  - Pro-Aj of 0.14    -Will start diuresis with Lasix 40 IV twice daily  - Monitor off antibiotics at this time especially given negative Pro-Aj consider redosing ceftriaxone 24 hours if concern for infection with worsening leukocytosis or fevers, continue home doxycycline  - incentive spirometry for compressive atelectasis on CT  -Accurate I's and O's  -Daily weights  - Can consider repeat echo  - Cardiovascular diet

## 2025-05-10 NOTE — ASSESSMENT & PLAN NOTE
"Lab Results   Component Value Date    HGBA1C 5.8 (H) 03/11/2025       No results for input(s): \"POCGLU\" in the last 72 hours.    Blood Sugar Average: Last 72 hrs:    - Continue home gabapentin  "

## 2025-05-10 NOTE — ASSESSMENT & PLAN NOTE
- s/p T12-S1 fusion (Dr. Moraes 4/11/2023), c/b L L5-S1 disc/abscess washout 6/14/24   - L5-S1 ALIF and redo T10-P fusion with Dr. Call on 3/21/25   -Continue home pain regimen oxycodone 15 every 4 hours as needed  - Continue home doxycycline 100 twice daily  -PT OT

## 2025-05-10 NOTE — ASSESSMENT & PLAN NOTE
Presented with oxygen level of 70%, was placed on nasal cannula oxygen  Likely in the setting of acute on chronic CHF  This morning patient saturating at 97% on 2 L nasal cannula oxygen  Wean off to maintain saturation greater than 90%

## 2025-05-10 NOTE — H&P
"H&P - Hospitalist   Name: Juan San 69 y.o. male I MRN: [id number]  Unit/Bed#: -01 I Date of Admission: 5/9/2025   Date of Service: 5/9/2025 I Hospital Day: 0     Assessment & Plan  Acute hypoxic respiratory failure (HCC)  -Presented with 2 days of shortness of breath requiring 8 L mid flow on arrival  - CT shows no PE findings of interstitial and alveolar pulmonary edema, compressive atelectasis with possible superimposed infection  - Reports lower extreme edema since surgery in March, recommend outpatient take Lasix for leg swelling however has not been taking, previous hospitalization for lower extremity edema as well  -Based on CT findings and findings of fluid overload on exam concerning more for CHF exacerbation  - Last echo 7/2024 showed EF 65% grade 1 diastolic dysfunction  -BNP elevated to 436  -Leukocytosis of 11.2  - Pro-Aj of 0.14    -Will start diuresis with Lasix 40 IV twice daily  - Monitor off antibiotics at this time especially given negative Pro-Aj consider redosing ceftriaxone 24 hours if concern for infection with worsening leukocytosis or fevers, continue home doxycycline  - incentive spirometry for compressive atelectasis on CT  -Accurate I's and O's  -Daily weights  - Can consider repeat echo  - Cardiovascular diet  Diabetic peripheral neuropathy (HCC)  Lab Results   Component Value Date    HGBA1C 5.8 (H) 03/11/2025       No results for input(s): \"POCGLU\" in the last 72 hours.    Blood Sugar Average: Last 72 hrs:    - Continue home gabapentin  Lumbar spondylosis  - s/p T12-S1 fusion (Dr. Moraes 4/11/2023), c/b L L5-S1 disc/abscess washout 6/14/24   - L5-S1 ALIF and redo T10-P fusion with Dr. Call on 3/21/25   -Continue home pain regimen oxycodone 15 every 4 hours as needed  - Continue home doxycycline 100 twice daily  -PT OT  Thyroid cancer (HCC)  - Status post partial thyroidectomy  Anxiety and depression  - Continue home regimen BuSpar 5 twice daily, Klonopin 1 twice daily, " "Lexapro, Remeron 45  Hypertension  - Home regimen includes losartan hydrochlorothiazide 100-25 combo medication however patient states is not taking  -Will continue rest of home regimen including metoprolol succinate 50 twice daily, Procardia  daily  Type 2 diabetes mellitus with hyperglycemia, without long-term current use of insulin ([facility])  Lab Results   Component Value Date    HGBA1C 5.8 (H) 03/11/2025       No results for input(s): \"POCGLU\" in the last 72 hours.    Blood Sugar Average: Last 72 hrs:  -Well-controlled on home regimen including Jardiance 10, metformin 1000 daily  Monitor on SSI and-Controlled diet    SIRS (systemic inflammatory response syndrome) (HCC)  - Presented meeting SIRS criteria with tachypnea respiratory rate 20, white count of 11.2  - Blood cultures drawn on arrival, given ceftriaxone azithromycin concern for possible pneumonia  - Initial lactate of 2.1 repeat of 0.9  - Pro-Aj of 0.14  - UA showed no nitrates leukocytes, WBCs  - Leukocytosis of 11  -CT chest findings likely consistent with fluid overload versus viral infection    -Will hold off on any fluids given concern for CHF exacerbation causing presenting symptoms  - Will monitor off antibiotics as no obvious signs of infection  - Continue monitoring white count and fever curve  - Will reassess in 24 hours of ceftriaxone is to be restarted, continue home doxycycline      VTE Pharmacologic Prophylaxis: VTE Score: 6 High Risk (Score >/= 5) - Pharmacological DVT Prophylaxis Ordered: enoxaparin (Lovenox). Sequential Compression Devices Ordered.  Code Status: Level 1 - Full Code per pt  Discussion with family: Updated  (significant other) at bedside.    Anticipated Length of Stay: Patient will be admitted on an inpatient basis with an anticipated length of stay of greater than 2 midnights secondary to hypoxia.    History of Present Illness   Chief Complaint: BRIA San is a 69 y.o. male with a PMH of type 2 " diabetes, hypertension, thyroid cancer status post partial thyroidectomy, anxiety/depression, recent spine surgery who presents with 2-day history of shortness of breath.  He states he has started having allergy symptoms for the past 2 days including runny nose and coughing.  He states when his visiting nurse came there today he was hypoxic to 86 but improved after some deep breaths.  He denies fevers, chills, headaches.  Wife is at bedside and reviewed medications.  He also states that after discharge from the hospital for spine surgery he has had leg swelling that is slowly increased.  He was prescribed Lasix for leg swelling but has not been taking.  He ambulates with a walker.    Upon arrival to the ED patient was afebrile pulse of 87, respiratory of 2077% on room air requiring a 5 L mid flow, blood pressure 126/61.  Initial CMP grossly unremarkable initial troponin of 6, lactate of 2.1, Pro-Aj 0.14, , WBC of 11.2, hemoglobin stable at 9.8, negative COVID flu RSV.  Blood cultures drawn on arrival.  CT PE study showed moderate bilateral pleural effusions, compressive atelectasis.  He was given ceftriaxone azithromycin in ED.    Review of Systems   Constitutional:  Negative for chills and fever.   HENT:  Positive for rhinorrhea.    Respiratory:  Positive for shortness of breath. Negative for cough and wheezing.    Gastrointestinal:  Negative for abdominal pain, constipation, diarrhea, nausea and vomiting.   Genitourinary:  Negative for difficulty urinating.   Neurological:  Negative for dizziness.       Historical Information   Past Medical History:   Diagnosis Date    Allergic     Anemia     Anxiety     Arthritis     Cancer (HCC)     Chronic back pain     Depression     Diabetes mellitus (HCC)     Hypertension     Liver disease     Mitral valve prolapse     Peripheral neuropathy     Neuropathy    PONV (postoperative nausea and vomiting)     Thyroid disease      Past Surgical History:   Procedure  Laterality Date    COLONOSCOPY      INCISION AND DRAINAGE OF WOUND Left 2022    Procedure: INCISION AND DRAINAGE (I&D) EXTREMITY;  Surgeon: Moustapha Cote DPM;  Location:  MAIN OR;  Service: Podiatry    IR BIOPSY SPINE  2024    IR BIOPSY SPINE  2024    IR PICC PLACEMENT SINGLE LUMEN  2024    JOINT REPLACEMENT Left 2022    Left TSA    ID ARTHRODESIS POSTERIOR/PSTLAT TQ 1NTRSPC LUMBAR Bilateral 2023    Procedure: L1-S1 navigated posterior decompression with instrumented fixation fusion;  Surgeon: James Moraes MD;  Location:  MAIN OR;  Service: Neurosurgery    THYROID SURGERY      remove cancer     Social History     Tobacco Use    Smoking status: Former     Current packs/day: 0.00     Average packs/day: 0.3 packs/day for 5.9 years (1.5 ttl pk-yrs)     Types: Cigarettes     Start date: 1975     Quit date: 1981     Years since quittin.9    Smokeless tobacco: Never    Tobacco comments:     quit ; smokes when in pain as of 26   Vaping Use    Vaping status: Former    Substances: THC, CBD   Substance and Sexual Activity    Alcohol use: Not Currently     Alcohol/week: 4.0 - 7.0 standard drinks of alcohol     Types: 1 - 2 Glasses of wine, 2 - 3 Cans of beer, 1 - 2 Shots of liquor per week     Comment: only on special occasions    Drug use: Not Currently     Frequency: 7.0 times per week     Types: Marijuana     Comment: Medical Marijuana    Sexual activity: Yes     Partners: Female     Birth control/protection: None     E-Cigarette/Vaping    E-Cigarette Use Former User     Comments medical marijuana - occ      E-Cigarette/Vaping Substances    Nicotine No     THC Yes     CBD Yes     Flavoring No     Other No     Unknown No        Social History:  Marital Status: /Civil Union   Patient Pre-hospital Living Situation: Home  Patient Pre-hospital Level of Mobility: walks with walker  Patient Pre-hospital Diet Restrictions: none    Meds/Allergies   I have  reviewed home medications with patient personally.  Prior to Admission medications    Medication Sig Start Date End Date Taking? Authorizing Provider   ACCU-CHEK FASTCLIX LANCETS MISC 4 (four) times a day 12/22/18   Historical Provider, MD   acetaminophen (TYLENOL) 500 mg tablet Take 2 tablets (1,000 mg total) by mouth every 8 (eight) hours 500 mg tablet 5/21/24   Janee Zuleta PA-C   Blood Glucose Monitoring Suppl (OneTouch Verio Reflect) w/Device KIT Check blood sugars four times daily. Please substitute with appropriate alternative as covered by patient's insurance. Dx: E11.65 5/5/25   RUIZ Barragan   busPIRone (BUSPAR) 5 mg tablet Take 5 mg by mouth 2 (two) times a day as needed 9/23/24   Historical Provider, MD   CANNABIDIOL PO medical marijuana as needed per latest dci  Indications: .    Historical Provider, MD   clonazePAM (KlonoPIN) 1 mg tablet Take 1 tablet (1 mg total) by mouth 2 (two) times a day & 3rd pill PRN for anxiety 2/20/25   Sintia Soto PA-C   cyclobenzaprine (FLEXERIL) 5 mg tablet Take 1 tablet (5 mg total) by mouth 3 (three) times a day as needed for muscle spasms 1/20/25   RUIZ Barragan   doxycycline hyclate (VIBRAMYCIN) 100 mg capsule Take 100 mg by mouth 2 (two) times a day Do not start before August 10, 2024. 8/10/24   Historical Provider, MD   enoxaparin (LOVENOX) 40 mg/0.4 mL Inject 40 mg under the skin daily 3/26/25   Historical Provider, MD   escitalopram (Lexapro) 20 mg tablet Take 20 mg by mouth daily. Indications: depression 5/29/24   Sabra Magaña DO   ferrous sulfate 325 (65 Fe) mg tablet Take 1 tablet by mouth every other day 3/26/25   Historical Provider, MD   finasteride (PROSCAR) 5 mg tablet Take 1 tablet (5 mg total) by mouth daily 2/4/25   Shirley Cordova PA-C   folic acid (FOLVITE) 1 mg tablet Take 2,000 mcg by mouth daily 12/17/18   Historical Provider, MD   furosemide (LASIX) 20 mg tablet Take 1 tablet (20 mg total) by mouth daily as needed (for  swelling) take 1 tab by mouth daily as needed for weight gain more than 3lbs overnight or more than 5lb in a week, or LE swelling. 4/29/25   RUIZ Barragan   gabapentin (NEURONTIN) 400 mg capsule Take 1 capsule by mouth twice daily 2/11/25   RUIZ Barragan   glucose blood (OneTouch Verio) test strip Check blood sugars four times daily. Please substitute with appropriate alternative as covered by patient's insurance. Dx: E11.65 5/5/25   RUIZ Barragan   hydrOXYzine pamoate (VISTARIL) 50 mg capsule Take 1 capsule (50 mg total) by mouth 2 (two) times a day 2/10/25   RUIZ Barragan   Jardiance 10 MG TABS tablet Take 1 tablet (10 mg total) by mouth every morning 4/30/25   RUIZ Barragan   ketoconazole (NIZORAL) 2 % shampoo Apply 1 Application topically 2 (two) times a week Use as directed 1/16/25   RUIZ Barragan   Lactobacillus Rhamnosus, GG, (Culturelle) CAPS Take 1 capsule by mouth daily 7/30/24   Historical Provider, MD   lidocaine (LIDODERM) 5 % Apply 1 patch topically over 12 hours daily Remove & Discard patch within 12 hours or as directed by MD 2/27/24   Harinder Marin MD   losartan-hydrochlorothiazide (HYZAAR) 100-25 MG per tablet Take 1 tablet by mouth in the morning 4/30/25   RUIZ Barragan   lovastatin (MEVACOR) 20 mg tablet Take 1 tablet (20 mg total) by mouth every morning 1/2/25   RUIZ Barragan   metFORMIN (GLUCOPHAGE) 1000 MG tablet Take 1 tablet (1,000 mg total) by mouth daily with breakfast 4/30/25   RUIZ Barragan   metoclopramide (REGLAN) 10 mg tablet Take 10 mg by mouth 2 (two) times a day before meals 11/16/24   Historical Provider, MD   metoprolol succinate (TOPROL-XL) 50 mg 24 hr tablet Take 50 mg by mouth 2 (two) times a day 4/3/24   Historical Provider, MD   mirtazapine (REMERON) 45 MG tablet TAKE 1 TABLET BY MOUTH ONCE DAILY AT BEDTIME 2/27/25   RUIZ Barragan   naloxone (NARCAN) 4 mg/0.1 mL nasal spray 0.1 mL (4 mg total) into each nostril every 3 (three)  minutes as needed for opioid reversal or respiratory depression 4/29/25   RUIZ Barragan   NIFEdipine (ADALAT CC) 90 mg 24 hr tablet Take 90 mg by mouth daily 11/20/24   Historical Provider, MD   NIFEdipine (PROCARDIA XL) 30 mg 24 hr tablet Take 1 tablet (30 mg total) by mouth daily With nifedipine 90 mg to total 120 mg daily 1/22/25 7/21/25  RUIZ Barragan   ondansetron (Zofran ODT) 4 mg disintegrating tablet Take 1 tablet (4 mg total) by mouth every 6 (six) hours as needed for nausea or vomiting 5/21/24   Janee Zuleta PA-C   OneTouch Delica Lancets 33G MISC Check blood sugars four times daily. Please substitute with appropriate alternative as covered by patient's insurance. Dx: E11.65 5/5/25   RUIZ Barragan   oxyCODONE (Roxicodone) 5 immediate release tablet Take 3 tablets (15 mg total) by mouth every 4 (four) hours as needed for severe pain Max Daily Amount: 90 mg 5/2/25   RUIZ Barragan   pantoprazole (PROTONIX) 40 mg tablet Take 1 tablet (40 mg total) by mouth 2 (two) times a day 2/20/25   Sintia Soto PA-C   patient supplied medication medical marijuana vape as needed per latest dci  Indications: .    Historical Provider, MD   polyethylene glycol (MIRALAX) 17 g packet Take 17 g by mouth 6/18/24   Historical Provider, MD   senna (SENOKOT) 8.6 mg Take 1 tablet (8.6 mg total) by mouth 2 (two) times a day 2 tabs at bedtime as needed for constipation per latest dci 5/21/24   Janee Zuleta PA-C   tamsulosin (FLOMAX) 0.4 mg TAKE 1 CAPSULE BY MOUTH DAILY WITH DINNER 8/13/24   Benedicto Rice MD   triamcinolone (KENALOG) 0.1 % ointment Apply topically 2 (two) times a day 12/17/24   RUIZ Barragan   ARIPiprazole (ABILIFY) 30 mg tablet Take 30 mg by mouth daily at bedtime  Patient not taking: Reported on 7/29/2022 12/17/18 8/9/22  Historical Provider, MD   DIGOX 250 MCG tablet TAKE 1 TABLET BY MOUTH EVERY DAY BUT DO NOT TAKE ON SUNDAY OR WEDNESDAY  Patient not taking: Reported on 7/29/2022  12/17/18 8/9/22  Historical Provider, MD   glipiZIDE (GLUCOTROL XL) 10 mg 24 hr tablet Take 10 mg by mouth 2 (two) times a day. Indications: Type 2 Diabetes 9/8/22 9/8/22  Sabra Magaña DO   HUMULIN N 100 UNIT/ML subcutaneous injection INJECT 40 UNITS IN THE MORNING AND 6 UNITS in THE IN THE EVENING AS DIRECTED  Patient not taking: Reported on 8/9/2022 12/27/18 8/9/22  Historical Provider, MD   ondansetron (ZOFRAN) 4 mg tablet Take 1 tablet (4 mg total) by mouth every 6 (six) hours as needed for nausea or vomiting  Patient not taking: Reported on 7/29/2022 4/29/22 8/9/22  Stefany Urbina DO   propranolol (INDERAL LA) 160 mg Take 160 mg by mouth daily 12/17/18 9/16/22  Historical Provider, MD     Allergies   Allergen Reactions    Abilify [Aripiprazole] Tremor     Shaking      Cephalexin Diarrhea    Molds & Smuts Allergic Rhinitis     Other Reaction(s): Allergic Rhinitis      Red itchy eye, congestion    Pregabalin Tremor     Lyrica - shaking feeling       Objective :  Temp:  [97.7 °F (36.5 °C)-98.2 °F (36.8 °C)] 97.7 °F (36.5 °C)  HR:  [76-93] 83  BP: (126-155)/(61-75) 139/75  Resp:  [18-20] 18  SpO2:  [70 %-96 %] 92 %  O2 Device: Mid flow nasal cannula  Nasal Cannula O2 Flow Rate (L/min):  [4 L/min-10 L/min] 10 L/min    Physical Exam  Constitutional:       General: He is not in acute distress.     Appearance: Normal appearance. He is not ill-appearing or toxic-appearing.   HENT:      Head: Normocephalic and atraumatic.   Cardiovascular:      Rate and Rhythm: Normal rate and regular rhythm.      Heart sounds: No murmur heard.     No gallop.   Pulmonary:      Effort: Pulmonary effort is normal. No respiratory distress.      Breath sounds: Rales present. No wheezing.   Abdominal:      General: Abdomen is flat. There is no distension.      Tenderness: There is no abdominal tenderness. There is no guarding.   Musculoskeletal:      Right lower leg: No edema.      Left lower leg: No edema.   Neurological:       Mental Status: He is alert and oriented to person, place, and time.   Psychiatric:         Mood and Affect: Mood normal.         Behavior: Behavior normal.          Lines/Drains:            Lab Results: I have reviewed the following results:  Results from last 7 days   Lab Units 05/09/25  1837   WBC Thousand/uL 11.20*   HEMOGLOBIN g/dL 9.8*   HEMATOCRIT % 33.1*   PLATELETS Thousands/uL 337   SEGS PCT % 83*   LYMPHO PCT % 9*   MONO PCT % 5   EOS PCT % 1     Results from last 7 days   Lab Units 05/09/25  1837   SODIUM mmol/L 137   POTASSIUM mmol/L 4.0   CHLORIDE mmol/L 102   CO2 mmol/L 26   BUN mg/dL 28*   CREATININE mg/dL 1.09   ANION GAP mmol/L 9   CALCIUM mg/dL 9.0   ALBUMIN g/dL 3.9   TOTAL BILIRUBIN mg/dL 0.32   ALK PHOS U/L 85   ALT U/L 8   AST U/L 10*   GLUCOSE RANDOM mg/dL 184*     Results from last 7 days   Lab Units 05/09/25  1837   INR  1.08         Lab Results   Component Value Date    HGBA1C 5.8 (H) 03/11/2025    HGBA1C 6.4 (H) 12/17/2024    HGBA1C 6.5 (H) 12/12/2024     Results from last 7 days   Lab Units 05/09/25 2036 05/09/25  1837   LACTIC ACID mmol/L 0.9 2.1*   PROCALCITONIN ng/ml  --  0.14       Imaging Results Review: I reviewed radiology reports from this admission including: CT chest.  Other Study Results Review: EKG was reviewed.     Administrative Statements   I have spent a total time of 35 minutes in caring for this patient on the day of the visit/encounter including Diagnostic results, Instructions for management, Patient and family education, Risk factor reductions, Impressions, Documenting in the medical record, Reviewing/placing orders in the medical record (including tests, medications, and/or procedures), Obtaining or reviewing history  , and Communicating with other healthcare professionals .    ** Please Note: This note has been constructed using a voice recognition system. **

## 2025-05-10 NOTE — PLAN OF CARE
Problem: OCCUPATIONAL THERAPY ADULT  Goal: Performs self-care activities at highest level of function for planned discharge setting.  See evaluation for individualized goals.  Description: Treatment Interventions: ADL retraining, UE strengthening/ROM, Functional transfer training, Endurance training, Cognitive reorientation, Equipment evaluation/education, Patient/family training, Neuromuscular reeducation, Compensatory technique education, Continued evaluation, Energy conservation, Activityengagement          See flowsheet documentation for full assessment, interventions and recommendations.   Note: Limitation: Decreased ADL status, Decreased UE strength, Decreased Safe judgement during ADL, Decreased endurance, Decreased high-level ADLs  Prognosis: Good  Assessment: Pt is a 69 y.o. male seen for OT evaluation at [facility]. Pt admit AHRF, PMH thyroid cancer, anxiety, depression, lumbar spondylosis s/p L5-S1 ALIF and redo T10-P fusion March 2025, T2DM. PTA, pt states IND with ADL, A with IADL. Pt lives with wife and recently hired part time caregiver to assist with IADL while wife is at work. Pt has FF[employer]. OT completed extensive review of pt's medical and social history. Personal factors affecting pt at time of IE include: difficulty performing ADLS and limited insight into deficits. Upon evaluation: Pt requires SUP bed mobility, SUP fxl mobility with RW, SUP sit<>stand with RW, SUP UB ADLs and modA for LB ADLS 2* the following deficits impacting occupational performance: weakness, decreased strength, decreased balance, decreased tolerance, and decreased safety awareness. Full objective findings from OT assessment regarding body systems outlined above. Pt to benefit from continued skilled OT tx while in the hospital to address deficits as defined above and maximize level of functional independence w/ ADL's and functional mobility. Occupational Performance areas to address include: bathing/shower, toilet  hygiene, dressing, health maintenance, and functional mobility. Based on findings, pt is of high complexity. The patient's raw score on the AM-PAC Daily Activity inpatient short form is  18, standardized score is 38.66 , less than 39.4. Patients at this level are likely to benefit from DC to post-acute rehabilitation services. However, please refer to therapist recommendation for discharge planning given other factors that may influence destination. At this time, OT recommendations at time of discharge are DC with Level III - Low Rehab Resource Intensity resources.     Rehab Resource Intensity Level, OT: III (Minimum Resource Intensity)

## 2025-05-10 NOTE — ASSESSMENT & PLAN NOTE
- Home regimen includes losartan hydrochlorothiazide 100-25 combo medication however patient states is not taking  -Will continue rest of home regimen including metoprolol succinate 50 twice daily, Procardia  daily

## 2025-05-10 NOTE — ASSESSMENT & PLAN NOTE
Lab Results   Component Value Date    HGBA1C 5.8 (H) 03/11/2025       Recent Labs     05/10/25  0726 05/10/25  1137   POCGLU 90 169*       Blood Sugar Average: Last 72 hrs:  (P) 129.5  Continue home gabapentin  Continue SSI

## 2025-05-10 NOTE — ASSESSMENT & PLAN NOTE
Home regimen includes losartan hydrochlorothiazide 100-25 combo medication however patient states is not taking  Will continue rest of home regimen including metoprolol succinate 50 twice daily, Procardia  daily

## 2025-05-10 NOTE — ED PROVIDER NOTES
Time reflects when diagnosis was documented in both MDM as applicable and the Disposition within this note       Time User Action Codes Description Comment    5/9/2025  8:58 PM Jennifer Barnes Add [R09.02] Hypoxia           ED Disposition       ED Disposition   Admit    Condition   Stable    Date/Time   Fri May 9, 2025  8:58 PM    Comment   Case was discussed with JOANNA and the patient's admission status was agreed to be Admission Status: inpatient status to the service of Dr. Mckeon .               Assessment & Plan       Medical Decision Making  The patient is a 69-year-old male who is presenting to the emergency department with hypoxia, progressive dyspnea and leg swelling.  Differential diagnosis includes, but is not limited to, CHF exacerbation, volume overload, community-acquired pneumonia, pulmonary embolism, etc.    Patient was afebrile with SpO2 of 77% on RA requiring 8 L mid flow on arrival, with improvement. CT with no PE findings however notes interstitial and alveolar pulmonary edema with moderate bilateral pleural effusions and compressive atelectasis of the lower lobes. CT also noted superimposed infection is possible, patient received IV antibiotics in the ED. Will admit to internal medicine.  Patient agreeable.    Amount and/or Complexity of Data Reviewed  Labs: ordered. Decision-making details documented in ED Course.  Radiology: ordered.    Risk  Prescription drug management.  Decision regarding hospitalization.        ED Course as of 05/10/25 1457   Fri May 09, 2025   1907 MAGNESIUM(!): 1.8  Will replenish   1907 LACTIC ACID(!): 2.1  Will wait for BNP   2001 BNP(!): 436  Increased since previous   2050 Message send to slim       Medications   enoxaparin (LOVENOX) subcutaneous injection 40 mg (has no administration in time range)   insulin lispro (HumALOG/ADMELOG) 100 units/mL subcutaneous injection 1-6 Units (has no administration in time range)   acetaminophen (TYLENOL) tablet 325 mg (has no  administration in time range)   busPIRone (BUSPAR) tablet 5 mg (has no administration in time range)   clonazePAM (KlonoPIN) tablet 1 mg (has no administration in time range)   cyclobenzaprine (FLEXERIL) tablet 5 mg (has no administration in time range)   doxycycline hyclate (VIBRAMYCIN) capsule 100 mg (has no administration in time range)   escitalopram (LEXAPRO) tablet 20 mg (has no administration in time range)   finasteride (PROSCAR) tablet 5 mg (has no administration in time range)   folic acid (FOLVITE) tablet 2,000 mcg (has no administration in time range)   gabapentin (NEURONTIN) capsule 400 mg (has no administration in time range)   pravastatin (PRAVACHOL) tablet 20 mg (has no administration in time range)   metoclopramide (REGLAN) tablet 10 mg (has no administration in time range)   metoprolol succinate (TOPROL-XL) 24 hr tablet 50 mg (has no administration in time range)   mirtazapine (REMERON) tablet 45 mg (has no administration in time range)   NIFEdipine (PROCARDIA XL) 24 hr tablet 90 mg (has no administration in time range)   NIFEdipine (PROCARDIA XL) 24 hr tablet 30 mg (has no administration in time range)   pantoprazole (PROTONIX) EC tablet 40 mg (has no administration in time range)   polyethylene glycol (MIRALAX) packet 17 g (has no administration in time range)   senna (SENOKOT) tablet 8.6 mg (has no administration in time range)   tamsulosin (FLOMAX) capsule 0.4 mg (has no administration in time range)   magnesium sulfate 2 g/50 mL IVPB (premix) 2 g (0 g Intravenous Stopped 5/9/25 2040)   iohexol (OMNIPAQUE) 350 MG/ML injection (MULTI-DOSE) 100 mL (100 mL Intravenous Given 5/9/25 1953)   azithromycin (ZITHROMAX) 500 mg in sodium chloride 0.9% 250mL IVPB 500 mg (500 mg Intravenous New Bag 5/9/25 2110)   cefTRIAXone (ROCEPHIN) IVPB (premix in dextrose) 2,000 mg 50 mL (2,000 mg Intravenous New Bag 5/9/25 2110)       ED Risk Strat Scores                    No data recorded        SBIRT 20yo+       Flowsheet Row Most Recent Value   Initial Alcohol Screen: US AUDIT-C     1. How often do you have a drink containing alcohol? 0 Filed at: 05/09/2025 1809   2. How many drinks containing alcohol do you have on a typical day you are drinking?  0 Filed at: 05/09/2025 1809   3a. Male UNDER 65: How often do you have five or more drinks on one occasion? 0 Filed at: 05/09/2025 1809   3b. FEMALE Any Age, or MALE 65+: How often do you have 4 or more drinks on one occassion? 0 Filed at: 05/09/2025 1809   Audit-C Score 0 Filed at: 05/09/2025 1809   JARON: How many times in the past year have you...    Used an illegal drug or used a prescription medication for non-medical reasons? Never Filed at: 05/09/2025 1809                            History of Present Illness       Chief Complaint   Patient presents with    Shortness of Breath       Past Medical History:   Diagnosis Date    Allergic     Anemia     Anxiety     Arthritis     Cancer (HCC)     Chronic back pain     Depression     Diabetes mellitus (HCC)     Hypertension     Liver disease     Mitral valve prolapse     Peripheral neuropathy     Neuropathy    PONV (postoperative nausea and vomiting)     Thyroid disease       Past Surgical History:   Procedure Laterality Date    COLONOSCOPY      INCISION AND DRAINAGE OF WOUND Left 08/12/2022    Procedure: INCISION AND DRAINAGE (I&D) EXTREMITY;  Surgeon: Moustapha Cote DPM;  Location:  MAIN OR;  Service: Podiatry    IR BIOPSY SPINE  1/30/2024    IR BIOPSY SPINE  2/1/2024    IR PICC PLACEMENT SINGLE LUMEN  2/6/2024    JOINT REPLACEMENT Left 03/11/2022    Left TSA    NE ARTHRODESIS POSTERIOR/PSTLAT TQ 1NTRSPC LUMBAR Bilateral 04/11/2023    Procedure: L1-S1 navigated posterior decompression with instrumented fixation fusion;  Surgeon: James Moraes MD;  Location:  MAIN OR;  Service: Neurosurgery    THYROID SURGERY  2021    remove cancer      Family History   Problem Relation Age of Onset    Hypertension Father     Dementia  Mother     Diabetes Mother     Colon cancer Neg Hx       Social History     Tobacco Use    Smoking status: Former     Current packs/day: 0.00     Average packs/day: 0.3 packs/day for 5.9 years (1.5 ttl pk-yrs)     Types: Cigarettes     Start date: 1975     Quit date: 1981     Years since quittin.9    Smokeless tobacco: Never    Tobacco comments:     quit ; smokes when in pain as of 26   Vaping Use    Vaping status: Former    Substances: THC, CBD   Substance Use Topics    Alcohol use: Not Currently     Alcohol/week: 4.0 - 7.0 standard drinks of alcohol     Types: 1 - 2 Glasses of wine, 2 - 3 Cans of beer, 1 - 2 Shots of liquor per week     Comment: only on special occasions    Drug use: Not Currently     Frequency: 7.0 times per week     Types: Marijuana     Comment: Medical Marijuana      E-Cigarette/Vaping    E-Cigarette Use Former User     Comments medical marijuana - occ       E-Cigarette/Vaping Substances    Nicotine No     THC Yes     CBD Yes     Flavoring No     Other No     Unknown No       I have reviewed and agree with the history as documented.     The patient is a 69-year-old male with a history of diabetes, hypertension, chronic back pain, status post recent spine surgery on 3/21, presenting to the emergency department with shortness of breath and hypoxia for the past 2 days along with rhinorrhea and cough.  Patient reports that his facility doctors noted his oxygen saturation to be 86% on RA, which improved with deep breathing exercises.  He denies fever, chills, headaches. The patient also reports progressive bilateral lower extremity swelling since hospital discharge following his back surgery, however he reports nonadherence to Lasix.      Shortness of Breath  Associated symptoms: cough    Associated symptoms: no abdominal pain, no chest pain, no ear pain, no fever, no rash, no sore throat and no vomiting        Review of Systems   Constitutional:  Negative for chills and  fever.   HENT:  Negative for ear pain and sore throat.    Eyes:  Negative for pain and visual disturbance.   Respiratory:  Positive for cough and shortness of breath.    Cardiovascular:  Negative for chest pain and palpitations.   Gastrointestinal:  Negative for abdominal pain and vomiting.   Genitourinary:  Negative for dysuria and hematuria.   Musculoskeletal:  Negative for arthralgias and back pain.   Skin:  Negative for color change and rash.   Neurological:  Negative for seizures and syncope.   All other systems reviewed and are negative.          Objective       ED Triage Vitals   Temperature Pulse Blood Pressure Respirations SpO2 Patient Position - Orthostatic VS   05/09/25 1805 05/09/25 1805 05/09/25 1805 05/09/25 1805 05/09/25 1805 05/09/25 1805   98.2 °F (36.8 °C) 87 126/61 20 (!) 77 % Sitting      Temp Source Heart Rate Source BP Location FiO2 (%) Pain Score    05/09/25 1805 05/10/25 0727 05/09/25 1805 -- 05/09/25 2137    Temporal Monitor Left arm  10 - Worst Possible Pain      Vitals      Date and Time Temp Pulse SpO2 Resp BP Pain Score FACES Pain Rating User   05/10/25 1122 -- -- 96 % -- -- -- -- MG   05/10/25 0915 -- -- 96 % -- -- No Pain --    05/10/25 0825 -- -- -- -- -- 10 - Worst Possible Pain -- RK   05/10/25 0733 -- 88 96 % -- 156/79 -- -- DII   05/10/25 0727 -- -- -- 18 -- -- --    05/10/25 0727 97.7 °F (36.5 °C) 84 97 % -- 157/124 -- -- DII   05/10/25 0104 -- -- -- -- -- 10 - Worst Possible Pain -- CR   05/09/25 2301 -- -- -- -- -- 9 -- CR   05/09/25 2148 97.7 °F (36.5 °C) 83 92 % -- 139/75 -- -- DII   05/09/25 2140 -- -- 94 % -- -- -- -- CW   05/09/25 2137 -- -- -- -- -- 10 - Worst Possible Pain -- CR   05/09/25 2030 -- 76 96 % 18 140/73 -- --    05/09/25 2006 -- -- 96 % -- -- -- --    05/09/25 1930 -- 84 94 % 20 143/70 -- --    05/09/25 1845 -- 83 94 % 18 -- -- --    05/09/25 1830 -- 93  70 % -- 155/70 -- --    05/09/25 1810 -- -- 91 % -- -- -- --    05/09/25 1805 98.2 °F  (36.8 °C) 87 77 % 20 126/61 -- -- KP            Physical Exam  Vitals and nursing note reviewed.   Constitutional:       General: He is not in acute distress.     Appearance: He is well-developed.   HENT:      Head: Normocephalic and atraumatic.   Eyes:      Conjunctiva/sclera: Conjunctivae normal.   Cardiovascular:      Rate and Rhythm: Normal rate and regular rhythm.      Heart sounds: No murmur heard.  Pulmonary:      Effort: Pulmonary effort is normal. No tachypnea, accessory muscle usage or respiratory distress.      Breath sounds: Rales present.      Comments: Patient arrived 77% on RA, improved with nasal cannula  Abdominal:      Palpations: Abdomen is soft.      Tenderness: There is no abdominal tenderness.   Musculoskeletal:         General: No swelling.      Cervical back: Neck supple.      Right lower leg: Edema present.      Left lower leg: Edema present.   Skin:     General: Skin is warm and dry.      Capillary Refill: Capillary refill takes less than 2 seconds.   Neurological:      Mental Status: He is alert.   Psychiatric:         Mood and Affect: Mood normal.         Results Reviewed       Procedure Component Value Units Date/Time    Blood culture #2 [[id number]] Collected: 05/09/25 1837    Lab Status: Preliminary result Specimen: Blood from Arm, Right Updated: 05/10/25 0035     Blood Culture Received in Microbiology Lab. Culture in Progress.    Blood culture #1 [[id number]] Collected: 05/09/25 1926    Lab Status: Preliminary result Specimen: Blood from Hand, Right Updated: 05/10/25 0034     Blood Culture Received in Microbiology Lab. Culture in Progress.    HS Troponin I 2hr [[id number]]  (Normal) Collected: 05/09/25 2036    Lab Status: Final result Specimen: Blood from Arm, Right Updated: 05/09/25 2105     hs TnI 2hr 6 ng/L      Delta 2hr hsTnI 0 ng/L     Lactic acid 2 Hours [[id number]]  (Normal) Collected: 05/09/25 2036    Lab Status: Final result Specimen: Blood from Arm, Right Updated: 05/09/25  2057     LACTIC ACID 0.9 mmol/L     Narrative:      Result may be elevated if tourniquet was used during collection.    B-Type Natriuretic Peptide(BNP) [[id number]]  (Abnormal) Collected: 05/09/25 1837    Lab Status: Final result Specimen: Blood from Arm, Right Updated: 05/09/25 1959      pg/mL     Urine Microscopic [[id number]]  (Abnormal) Collected: 05/09/25 1924    Lab Status: Final result Specimen: Urine, Clean Catch Updated: 05/09/25 1947     RBC, UA 0-1 /hpf      WBC, UA 0-1 /hpf      Epithelial Cells Occasional /hpf      Bacteria, UA Occasional /hpf      MUCUS THREADS Occasional     Granular Casts, UA 0-3    UA w Reflex to Microscopic w Reflex to Culture [[id number]]  (Abnormal) Collected: 05/09/25 1924    Lab Status: Final result Specimen: Urine, Clean Catch Updated: 05/09/25 1946     Color, UA Yellow     Clarity, UA Clear     Specific Gravity, UA 1.020     pH, UA 5.5     Leukocytes, UA Negative     Nitrite, UA Negative     Protein, UA Trace mg/dl      Glucose,  (3/10%) mg/dl      Ketones, UA Negative mg/dl      Urobilinogen, UA <2.0 mg/dl      Bilirubin, UA Negative     Occult Blood, UA Negative    FLU/RSV/COVID - if FLU/RSV clinically relevant (2hr TAT) [[id number]]  (Normal) Collected: 05/09/25 1839    Lab Status: Final result Specimen: Nares from Nose Updated: 05/09/25 1925     SARS-CoV-2 Negative     INFLUENZA A PCR Negative     INFLUENZA B PCR Negative     RSV PCR Negative    Narrative:      This test has been performed using the CoV-2/Flu/RSV plus assay on the [employer] GeneXpert platform. This test has been validated by the  and verified by the performing laboratory.     This test is designed to amplify and detect the following: nucleocapsid (N), envelope (E), and RNA-dependent RNA polymerase (RdRP) genes of the SARS-CoV-2 genome; matrix (M), basic polymerase (PB2), and acidic protein (PA) segments of the influenza A genome; matrix (M) and non-structural protein (NS) segments of  the influenza B genome, and the nucleocapsid genes of RSV A and RSV B.     Positive results are indicative of the presence of Flu A, Flu B, RSV, and/or SARS-CoV-2 RNA. Positive results for SARS-CoV-2 or suspected novel influenza should be reported to state, local, or federal health departments according to local reporting requirements.      All results should be assessed in conjunction with clinical presentation and other laboratory markers for clinical management.     FOR PEDIATRIC PATIENTS - copy/paste COVID Guidelines URL to browser: [email or web address]       Procalcitonin [[id number]]  (Normal) Collected: 05/09/25 1837    Lab Status: Final result Specimen: Blood from Arm, Right Updated: 05/09/25 1915     Procalcitonin 0.14 ng/ml     D-dimer, quantitative [[id number]]  (Abnormal) Collected: 05/09/25 1837    Lab Status: Final result Specimen: Blood from Arm, Right Updated: 05/09/25 1915     D-Dimer, Quant 1.98 ug/ml FEU     Narrative:      In the evaluation for possible pulmonary embolism, in the appropriate (Well's Score of 4 or less) patient, the age adjusted d-dimer cutoff for this patient can be calculated as:    Age x 0.01 (in ug/mL) for Age-adjusted D-dimer exclusion threshold for a patient over 50 years.    HS Troponin 0hr (reflex protocol) [[id number]]  (Normal) Collected: 05/09/25 1837    Lab Status: Final result Specimen: Blood from Arm, Right Updated: 05/09/25 1909     hs TnI 0hr 6 ng/L     Lactic acid [[id number]]  (Abnormal) Collected: 05/09/25 1837    Lab Status: Final result Specimen: Blood from Arm, Right Updated: 05/09/25 1906     LACTIC ACID 2.1 mmol/L     Narrative:      Result may be elevated if tourniquet was used during collection.    Comprehensive metabolic panel [[id number]]  (Abnormal) Collected: 05/09/25 1837    Lab Status: Final result Specimen: Blood from Arm, Right Updated: 05/09/25 1906     Sodium 137 mmol/L      Potassium 4.0 mmol/L       Chloride 102 mmol/L      CO2 26 mmol/L      ANION GAP 9 mmol/L      BUN 28 mg/dL      Creatinine 1.09 mg/dL      Glucose 184 mg/dL      Calcium 9.0 mg/dL      AST 10 U/L      ALT 8 U/L      Alkaline Phosphatase 85 U/L      Total Protein 7.0 g/dL      Albumin 3.9 g/dL      Total Bilirubin 0.32 mg/dL      eGFR 69 ml/min/1.73sq m     Narrative:      National Kid[employer] Disease [employer] guidelines for Chronic Kidney Disease (CKD):     Stage 1 with normal or high GFR (GFR > 90 mL/min/1.73 square meters)    Stage 2 Mild CKD (GFR = 60-89 mL/min/1.73 square meters)    Stage 3A Moderate CKD (GFR = 45-59 mL/min/1.73 square meters)    Stage 3B Moderate CKD (GFR = 30-44 mL/min/1.73 square meters)    Stage 4 Severe CKD (GFR = 15-29 mL/min/1.73 square meters)    Stage 5 End Stage CKD (GFR <15 mL/min/1.73 square meters)  Note: GFR calculation is accurate only with a steady state creatinine    Magnesium [[id number]]  (Abnormal) Collected: 05/09/25 1837    Lab Status: Final result Specimen: Blood from Arm, Right Updated: 05/09/25 1906     Magnesium 1.8 mg/dL     Protime-INR [[id number]]  (Normal) Collected: 05/09/25 1837    Lab Status: Final result Specimen: Blood from Arm, Right Updated: 05/09/25 1900     Protime 14.6 seconds      INR 1.08    Narrative:      INR Therapeutic Range    Indication                                             INR Range      Atrial Fibrillation                                               2.0-3.0  Hypercoagulable State                                    2.0.2.3  Left Ventricular Asist Device                            2.0-3.0  Mechanical Heart Valve                                  -    Aortic(with afib, MI, embolism, HF, LA enlargement,    and/or coagulopathy)                                     2.0-3.0 (2.5-3.5)     Mitral                                                             2.5-3.5  Prosthetic/Bioprosthetic Heart Valve               2.0-3.0  Venous thromboembolism (VTE: VT, PE        2.0-3.0     APTT [[id number]]  (Normal) Collected: 05/09/25 1837    Lab Status: Final result Specimen: Blood from Arm, Right Updated: 05/09/25 1900     PTT 31 seconds     CBC and differential [[id number]]  (Abnormal) Collected: 05/09/25 1837    Lab Status: Final result Specimen: Blood from Arm, Right Updated: 05/09/25 1848     WBC 11.20 Thousand/uL      RBC 3.61 Million/uL      Hemoglobin 9.8 g/dL      Hematocrit 33.1 %      MCV 92 fL      MCH 27.1 pg      MCHC 29.6 g/dL      RDW 16.7 %      MPV 9.9 fL      Platelets 337 Thousands/uL      nRBC 0 /100 WBCs      Segmented % 83 %      Immature Grans % 1 %      Lymphocytes % 9 %      Monocytes % 5 %      Eosinophils Relative 1 %      Basophils Relative 1 %      Absolute Neutrophils 9.40 Thousands/µL      Absolute Immature Grans 0.07 Thousand/uL      Absolute Lymphocytes 0.97 Thousands/µL      Absolute Monocytes 0.60 Thousand/µL      Eosinophils Absolute 0.10 Thousand/µL      Basophils Absolute 0.06 Thousands/µL             CTA chest pe study   Final Interpretation by Tommy Noonan MD (05/09 2013)      1.  No evidence of pulmonary embolism.   2.  Interstitial and alveolar pulmonary edema with moderate bilateral pleural effusions and compressive atelectasis of the lower lobes. A component of superimposed infection is possible given the groundglass but alveolar edema alone could have this    appearance.      The study was marked in EPIC for immediate notification.      Workstation performed: [id number]         XR chest portable   Final Interpretation by Redd Hill MD (05/10 0818)      Pulmonary interstitial edema, better characterized on subsequent same day CT study. Superimposed infection could be considered in the appropriate clinical context. Please refer to full CT report for further discussion.            Resident: Nicolas Maddox I, the attending radiologist, have reviewed the images and agree with the final report above.      Workstation performed: [id number]              ECG 12 Lead Documentation Only    Date/Time: 2025 6:40 PM    Performed by: Jennifer Barnes PA-C  Authorized by: Jennifer Barnes PA-C    Indications / Diagnosis:  Hypoxia  ECG reviewed by me, the ED Provider: yes    Patient location:  ED  Rate:     ECG rate:  85    ECG rate assessment: normal    Rhythm:     Rhythm: sinus rhythm    T waves:     T waves: non-specific        ED Medication and Procedure Management   Prior to Admission Medications   Prescriptions Last Dose Informant Patient Reported? Taking?   ACCU-CHEK FASTCLIX LANCETS MISC Not Taking Spouse/Significant Other, Self Yes No   Si (four) times a day   Patient not taking: Reported on 2025   Blood Glucose Monitoring Suppl (OneTouch Verio Reflect) w/Device KIT Not Taking  No No   Sig: Check blood sugars four times daily. Please substitute with appropriate alternative as covered by patient's insurance. Dx: E11.65   Patient not taking: Reported on 2025   CANNABIDIOL PO 2025 Morning Spouse/Significant Other, Self Yes Yes   Sig: medical marijuana as needed per latest dci  Indications: .   Jardiance 10 MG TABS tablet 2025 Morning  No Yes   Sig: Take 1 tablet (10 mg total) by mouth every morning   Lactobacillus Rhamnosus, GG, (Culturelle) CAPS Unknown Spouse/Significant Other, Self Yes No   Sig: Take 1 capsule by mouth daily   NIFEdipine (ADALAT CC) 90 mg 24 hr tablet 2025 Morning Spouse/Significant Other, Self Yes Yes   Sig: Take 90 mg by mouth daily   NIFEdipine (PROCARDIA XL) 30 mg 24 hr tablet 2025 Morning Self, Spouse/Significant Other No Yes   Sig: Take 1 tablet (30 mg total) by mouth daily With nifedipine 90 mg to total 120 mg daily   OneTouch Delica Lancets 33G MISC Not Taking  No No   Sig: Check blood sugars four times daily. Please substitute with appropriate alternative as covered by patient's insurance. Dx: E11.65   Patient not taking: Reported on 2025   acetaminophen (TYLENOL) 500 mg tablet Unknown Spouse/Significant  Other, Self No No   Sig: Take 2 tablets (1,000 mg total) by mouth every 8 (eight) hours 500 mg tablet   busPIRone (BUSPAR) 5 mg tablet 5/9/2025 Morning Spouse/Significant Other, Self Yes Yes   Sig: Take 5 mg by mouth 2 (two) times a day as needed   clonazePAM (KlonoPIN) 1 mg tablet 5/8/2025 Bedtime Self, Spouse/Significant Other No Yes   Sig: Take 1 tablet (1 mg total) by mouth 2 (two) times a day & 3rd pill PRN for anxiety   cyclobenzaprine (FLEXERIL) 5 mg tablet 5/9/2025 Morning Self, Spouse/Significant Other No Yes   Sig: Take 1 tablet (5 mg total) by mouth 3 (three) times a day as needed for muscle spasms   doxycycline hyclate (VIBRAMYCIN) 100 mg capsule 5/9/2025 Morning Spouse/Significant Other, Self Yes Yes   Sig: Take 100 mg by mouth 2 (two) times a day Do not start before August 10, 2024.   enoxaparin (LOVENOX) 40 mg/0.4 mL Not Taking  Yes No   Sig: Inject 40 mg under the skin daily   Patient not taking: Reported on 5/9/2025   escitalopram (Lexapro) 20 mg tablet 5/9/2025 Morning Spouse/Significant Other, Self Yes Yes   Sig: Take 20 mg by mouth daily. Indications: depression   ferrous sulfate 325 (65 Fe) mg tablet 5/9/2025 Morning  Yes Yes   Sig: Take 1 tablet by mouth every other day   finasteride (PROSCAR) 5 mg tablet 5/9/2025 Morning Self, Spouse/Significant Other No Yes   Sig: Take 1 tablet (5 mg total) by mouth daily   folic acid (FOLVITE) 1 mg tablet 5/9/2025 Morning Spouse/Significant Other, Self Yes Yes   Sig: Take 2,000 mcg by mouth daily   furosemide (LASIX) 20 mg tablet 5/9/2025 Morning  No Yes   Sig: Take 1 tablet (20 mg total) by mouth daily as needed (for swelling) take 1 tab by mouth daily as needed for weight gain more than 3lbs overnight or more than 5lb in a week, or LE swelling.   gabapentin (NEURONTIN) 400 mg capsule 5/9/2025 Self, Spouse/Significant Other No Yes   Sig: Take 1 capsule by mouth twice daily   glucose blood (OneTouch Verio) test strip Not Taking  No No   Sig: Check blood  sugars four times daily. Please substitute with appropriate alternative as covered by patient's insurance. Dx: E11.65   Patient not taking: Reported on 2025   hydrOXYzine pamoate (VISTARIL) 50 mg capsule 2025 Morning Self, Spouse/Significant Other No Yes   Sig: Take 1 capsule (50 mg total) by mouth 2 (two) times a day   ketoconazole (NIZORAL) 2 % shampoo 2025 Evening Self, Spouse/Significant Other No Yes   Sig: Apply 1 Application topically 2 (two) times a week Use as directed   lidocaine (LIDODERM) 5 % Not Taking Spouse/Significant Other, Self No No   Sig: Apply 1 patch topically over 12 hours daily Remove & Discard patch within 12 hours or as directed by MD   Patient not taking: Reported on 2025   losartan-hydrochlorothiazide (HYZAAR) 100-25 MG per tablet 2025 Morning  No Yes   Sig: Take 1 tablet by mouth in the morning   lovastatin (MEVACOR) 20 mg tablet 2025 Morning Self, Spouse/Significant Other No Yes   Sig: Take 1 tablet (20 mg total) by mouth every morning   metFORMIN (GLUCOPHAGE) 1000 MG tablet 2025 Morning  No Yes   Sig: Take 1 tablet (1,000 mg total) by mouth daily with breakfast   metoclopramide (REGLAN) 10 mg tablet 2025 Morning Spouse/Significant Other, Self Yes Yes   Sig: Take 10 mg by mouth 2 (two) times a day before meals   metoprolol succinate (TOPROL-XL) 50 mg 24 hr tablet 2025 Morning Spouse/Significant Other, Self Yes Yes   Sig: Take 50 mg by mouth 2 (two) times a day   mirtazapine (REMERON) 45 MG tablet 2025 Bedtime Self, Spouse/Significant Other No Yes   Sig: TAKE 1 TABLET BY MOUTH ONCE DAILY AT BEDTIME   naloxone (NARCAN) 4 mg/0.1 mL nasal spray Not Taking  No No   Si.1 mL (4 mg total) into each nostril every 3 (three) minutes as needed for opioid reversal or respiratory depression   Patient not taking: Reported on 2025   ondansetron (Zofran ODT) 4 mg disintegrating tablet Unknown Spouse/Significant Other, Self No No   Sig: Take 1 tablet (4 mg  total) by mouth every 6 (six) hours as needed for nausea or vomiting   oxyCODONE (Roxicodone) 5 immediate release tablet 5/8/2025 Bedtime  No Yes   Sig: Take 3 tablets (15 mg total) by mouth every 4 (four) hours as needed for severe pain Max Daily Amount: 90 mg   pantoprazole (PROTONIX) 40 mg tablet 5/9/2025 Morning Self, Spouse/Significant Other No Yes   Sig: Take 1 tablet (40 mg total) by mouth 2 (two) times a day   patient supplied medication  Spouse/Significant Other, Self Yes No   Sig: medical marijuana vape as needed per latest dci  Indications: .   polyethylene glycol (MIRALAX) 17 g packet Not Taking Spouse/Significant Other, Self Yes No   Sig: Take 17 g by mouth   Patient not taking: Reported on 5/9/2025   senna (SENOKOT) 8.6 mg Unknown Spouse/Significant Other, Self No No   Sig: Take 1 tablet (8.6 mg total) by mouth 2 (two) times a day 2 tabs at bedtime as needed for constipation per latest dci   tamsulosin (FLOMAX) 0.4 mg 5/8/2025 Evening Spouse/Significant Other, Self No Yes   Sig: TAKE 1 CAPSULE BY MOUTH DAILY WITH DINNER   triamcinolone (KENALOG) 0.1 % ointment Not Taking Self, Spouse/Significant Other No No   Sig: Apply topically 2 (two) times a day   Patient not taking: Reported on 5/9/2025      Facility-Administered Medications: None     Current Discharge Medication List        CONTINUE these medications which have NOT CHANGED    Details   busPIRone (BUSPAR) 5 mg tablet Take 5 mg by mouth 2 (two) times a day as needed      CANNABIDIOL PO medical marijuana as needed per latest dci  Indications: .      clonazePAM (KlonoPIN) 1 mg tablet Take 1 tablet (1 mg total) by mouth 2 (two) times a day & 3rd pill PRN for anxiety  Qty: 20 tablet, Refills: 0    Associated Diagnoses: Anxiety and depression      cyclobenzaprine (FLEXERIL) 5 mg tablet Take 1 tablet (5 mg total) by mouth 3 (three) times a day as needed for muscle spasms  Qty: 60 tablet, Refills: 0    Associated Diagnoses: Lumbar radiculopathy; Muscle  spasm      doxycycline hyclate (VIBRAMYCIN) 100 mg capsule Take 100 mg by mouth 2 (two) times a day Do not start before August 10, 2024.      escitalopram (Lexapro) 20 mg tablet Take 20 mg by mouth daily. Indications: depression      ferrous sulfate 325 (65 Fe) mg tablet Take 1 tablet by mouth every other day      finasteride (PROSCAR) 5 mg tablet Take 1 tablet (5 mg total) by mouth daily  Qty: 100 tablet, Refills: 0    Comments: This prescription was filled on 5/7/2024. Any refills authorized will be placed on file.  Associated Diagnoses: Urinary retention      folic acid (FOLVITE) 1 mg tablet Take 2,000 mcg by mouth daily  Refills: 6      furosemide (LASIX) 20 mg tablet Take 1 tablet (20 mg total) by mouth daily as needed (for swelling) take 1 tab by mouth daily as needed for weight gain more than 3lbs overnight or more than 5lb in a week, or LE swelling.  Qty: 90 tablet, Refills: 1    Associated Diagnoses: Left leg swelling      gabapentin (NEURONTIN) 400 mg capsule Take 1 capsule by mouth twice daily  Qty: 60 capsule, Refills: 5    Associated Diagnoses: Diabetic peripheral neuropathy (HCC); Lumbar radiculopathy      hydrOXYzine pamoate (VISTARIL) 50 mg capsule Take 1 capsule (50 mg total) by mouth 2 (two) times a day  Qty: 60 capsule, Refills: 3    Associated Diagnoses: Anxiety and depression      Jardiance 10 MG TABS tablet Take 1 tablet (10 mg total) by mouth every morning    Associated Diagnoses: Type 2 diabetes mellitus with hyperglycemia, without long-term current use of insulin (HCC)      ketoconazole (NIZORAL) 2 % shampoo Apply 1 Application topically 2 (two) times a week Use as directed  Qty: 120 mL, Refills: 1    Associated Diagnoses: Seborrheic dermatitis      losartan-hydrochlorothiazide (HYZAAR) 100-25 MG per tablet Take 1 tablet by mouth in the morning  Qty: 90 tablet, Refills: 1    Associated Diagnoses: Primary hypertension      lovastatin (MEVACOR) 20 mg tablet Take 1 tablet (20 mg total) by mouth  every morning  Qty: 90 tablet, Refills: 1    Associated Diagnoses: Hypercholesterolemia      metFORMIN (GLUCOPHAGE) 1000 MG tablet Take 1 tablet (1,000 mg total) by mouth daily with breakfast  Qty: 90 tablet, Refills: 1    Associated Diagnoses: Type 2 diabetes mellitus with hyperglycemia, without long-term current use of insulin ([facility])      metoclopramide (REGLAN) 10 mg tablet Take 10 mg by mouth 2 (two) times a day before meals      metoprolol succinate (TOPROL-XL) 50 mg 24 hr tablet Take 50 mg by mouth 2 (two) times a day      mirtazapine (REMERON) 45 MG tablet TAKE 1 TABLET BY MOUTH ONCE DAILY AT BEDTIME  Qty: 30 tablet, Refills: 5    Associated Diagnoses: Bipolar II disorder ([facility])      !! NIFEdipine (ADALAT CC) 90 mg 24 hr tablet Take 90 mg by mouth daily      !! NIFEdipine (PROCARDIA XL) 30 mg 24 hr tablet Take 1 tablet (30 mg total) by mouth daily With nifedipine 90 mg to total 120 mg daily  Qty: 90 tablet, Refills: 1    Associated Diagnoses: Primary hypertension      oxyCODONE (Roxicodone) 5 immediate release tablet Take 3 tablets (15 mg total) by mouth every 4 (four) hours as needed for severe pain Max Daily Amount: 90 mg  Qty: 60 tablet, Refills: 0    Associated Diagnoses: Status post lumbar spinal fusion; Lumbar radiculopathy      pantoprazole (PROTONIX) 40 mg tablet Take 1 tablet (40 mg total) by mouth 2 (two) times a day    Associated Diagnoses: Esophagitis      tamsulosin (FLOMAX) 0.4 mg TAKE 1 CAPSULE BY MOUTH DAILY WITH DINNER  Qty: 90 capsule, Refills: 1    Associated Diagnoses: S/P lumbar fusion      !! ACCU-CHEK FASTCLIX LANCETS MISC 4 (four) times a day  Refills: 1      acetaminophen (TYLENOL) 500 mg tablet Take 2 tablets (1,000 mg total) by mouth every 8 (eight) hours 500 mg tablet    Associated Diagnoses: S/P lumbar fusion      Blood Glucose Monitoring Suppl (OneTouch Verio Reflect) w/Device KIT Check blood sugars four times daily. Please substitute with appropriate alternative as covered by  patient's insurance. Dx: E11.65  Qty: 1 kit, Refills: 0    Comments: Please substitute with appropriate alternative as covered by patient's insurance  Associated Diagnoses: Type 2 diabetes mellitus with hyperglycemia, without long-term current use of insulin (HCC)      enoxaparin (LOVENOX) 40 mg/0.4 mL Inject 40 mg under the skin daily      glucose blood (OneTouch Verio) test strip Check blood sugars four times daily. Please substitute with appropriate alternative as covered by patient's insurance. Dx: E11.65  Qty: 400 each, Refills: 3    Comments: Please substitute with appropriate alternative as covered by patient's insurance  Associated Diagnoses: Type 2 diabetes mellitus with hyperglycemia, without long-term current use of insulin (HCC)      Lactobacillus Rhamnosus, GG, (Culturelle) CAPS Take 1 capsule by mouth daily      lidocaine (LIDODERM) 5 % Apply 1 patch topically over 12 hours daily Remove & Discard patch within 12 hours or as directed by MD  Qty: 15 patch, Refills: 0    Associated Diagnoses: S/P lumbar fusion      naloxone (NARCAN) 4 mg/0.1 mL nasal spray 0.1 mL (4 mg total) into each nostril every 3 (three) minutes as needed for opioid reversal or respiratory depression  Qty: 1 each, Refills: 1    Associated Diagnoses: Benzodiazepine dependence ([facility])      ondansetron (Zofran ODT) 4 mg disintegrating tablet Take 1 tablet (4 mg total) by mouth every 6 (six) hours as needed for nausea or vomiting  Qty: 30 tablet, Refills: 0    Associated Diagnoses: Viral gastroenteritis      !! OneTouch Delica Lancets 33G MISC Check blood sugars four times daily. Please substitute with appropriate alternative as covered by patient's insurance. Dx: E11.65  Qty: 400 each, Refills: 3    Comments: Please substitute with appropriate alternative as covered by patient's insurance  Associated Diagnoses: Type 2 diabetes mellitus with hyperglycemia, without long-term current use of insulin (HCC)      patient supplied medication  medical marijuana vape as needed per latest dci  Indications: .      polyethylene glycol (MIRALAX) 17 g packet Take 17 g by mouth      senna (SENOKOT) 8.6 mg Take 1 tablet (8.6 mg total) by mouth 2 (two) times a day 2 tabs at bedtime as needed for constipation per latest dci    Associated Diagnoses: Chronic bilateral low back pain with bilateral sciatica      triamcinolone (KENALOG) 0.1 % ointment Apply topically 2 (two) times a day  Qty: 30 g, Refills: 1    Associated Diagnoses: Eczema, unspecified type       !! - Potential duplicate medications found. Please discuss with provider.        No discharge procedures on file.  ED SEPSIS DOCUMENTATION   Time reflects when diagnosis was documented in both MDM as applicable and the Disposition within this note       Time User Action Codes Description Comment    5/9/2025  8:58 PM Jennifer Barnes Add [R09.02] Hypoxia                  Jennifer Barnes PA-C  05/10/25 1457

## 2025-05-10 NOTE — PLAN OF CARE
Problem: Potential for Falls  Goal: Patient will remain free of falls  Description: INTERVENTIONS:- Educate patient/family on patient safety including physical limitations- Instruct patient to call for assistance with activity - Consult OT/PT to assist with strengthening/mobility - Keep Call bell within reach- Keep bed low and locked with side rails adjusted as appropriate- Keep care items and personal belongings within reach- Initiate and maintain comfort rounds- Make Fall Risk Sign visible to staff- Offer Toileting every 2 Hours, in advance of need- Initiate/Maintain alarm- Obtain necessary fall risk management equipment: - Apply yellow socks and bracelet for high fall risk patients- Consider moving patient to room near nurses station  Outcome: Progressing

## 2025-05-10 NOTE — OCCUPATIONAL THERAPY NOTE
Occupational Therapy Evaluation     Patient Name: Juan San  Today's Date: 5/10/2025  Problem List  Principal Problem:    Acute hypoxic respiratory failure (HCC)  Active Problems:    Diabetic peripheral neuropathy (HCC)    Lumbar spondylosis    Thyroid cancer (HCC)    Anxiety and depression    Hypertension    Type 2 diabetes mellitus with hyperglycemia, without long-term current use of insulin (HCC)    SIRS (systemic inflammatory response syndrome) (HCC)    Past Medical History  Past Medical History:   Diagnosis Date    Allergic     Anemia     Anxiety     Arthritis     Cancer (HCC)     Chronic back pain     Depression     Diabetes mellitus (HCC)     Hypertension     Liver disease     Mitral valve prolapse     Peripheral neuropathy     Neuropathy    PONV (postoperative nausea and vomiting)     Thyroid disease      Past Surgical History  Past Surgical History:   Procedure Laterality Date    COLONOSCOPY      INCISION AND DRAINAGE OF WOUND Left 08/12/2022    Procedure: INCISION AND DRAINAGE (I&D) EXTREMITY;  Surgeon: Moustapha Cote DPM;  Location:  MAIN OR;  Service: Podiatry    IR BIOPSY SPINE  1/30/2024    IR BIOPSY SPINE  2/1/2024    IR PICC PLACEMENT SINGLE LUMEN  2/6/2024    JOINT REPLACEMENT Left 03/11/2022    Left TSA    AZ ARTHRODESIS POSTERIOR/PSTLAT TQ 1NTRSPC LUMBAR Bilateral 04/11/2023    Procedure: L1-S1 navigated posterior decompression with instrumented fixation fusion;  Surgeon: James Moraes MD;  Location:  MAIN OR;  Service: Neurosurgery    THYROID SURGERY  2021    remove cancer         05/10/25 0825   OT Last Visit   OT Visit Date 05/10/25   Note Type   Note type Evaluation   Pain Assessment   Pain Assessment Tool 0-10   Pain Score 10 - Worst Possible Pain   Pain Location/Orientation Orientation: Lower;Location: Back   Hospital Pain Intervention(s) Repositioned;Ambulation/increased activity   Multiple Pain Sites No   Restrictions/Precautions   Weight Bearing Precautions Per Order No  "  Other Precautions Contact/isolation;Chair Alarm;Multiple lines;Fall Risk;O2  (on 6L NC)   Home Living   Type of Home House   Home Layout One level   Bathroom Shower/Tub Walk-in shower   Bathroom Toilet Standard   Bathroom Equipment Shower chair;Grab bars in shower   Home Equipment Walker;Cane;Wheelchair-manual   Prior Function   Level of [place] Independent with ADLs;Independent with functional mobility;Needs assistance with IADLS   Lives With Spouse  (spouse works throughout the day)   Receives Help From Family   IADLs Family/Friend/Other provides transportation;Family/Friend/Other provides meals;Family/Friend/Other provides medication management   Falls in the last 6 months 5 to 10   Vocational Retired   Comments has aide that comes now to supervise ADLs, helps with IADLs   General   Additional Pertinent History Pt admit AHRF, PMH thyroid cancer, anxiety, depression, lumbar spondylosis s/p L5-S1 ALIF and redo T10-P fusion March 2025, T2DM.   Subjective   Subjective \"I always have pain\"   ADL   Eating Assistance 7  Independent   Grooming Assistance 7  Independent   UB Bathing Assistance 5  Supervision/Setup   UB Bathing Deficit Verbal cueing;Supervision/safety;Increased time to complete   LB Bathing Assistance 3  Moderate Assistance   LB Bathing Deficit Verbal cueing;Supervision/safety;Increased time to complete   UB Dressing Assistance 5  Supervision/Setup   UB Dressing Deficit Verbal cueing;Supervision/safety;Increased time to complete   LB Dressing Assistance 3  Moderate Assistance   LB Dressing Deficit Supervision/safety;Verbal cueing;Steadying;Requires assistive device for steadying;Thread RLE into pants;Thread LLE into pants;Thread RLE into underwear;Thread LLE into underwear;Pull up over hips   Toileting Assistance  4  Minimal Assistance   Toileting Deficit Verbal cueing;Steadying;Impulsive;Supervison/safety;Increased time to complete   Bed Mobility   Supine to Sit 5  Supervision   Additional items " Assist x 1;Increased time required;Verbal cues;Bedrails   Transfers   Sit to Stand 5  Supervision   Additional items Assist x 1;Bedrails;Verbal cues   Stand to Sit 5  Supervision   Additional items Assist x 1;Armrests;Increased time required;Verbal cues   Functional Mobility   Functional Mobility 5  Supervision   Additional items Rolling walker   Balance   Static Sitting Good   Dynamic Sitting Good   Static Standing Fair +   Dynamic Standing Fair +   Activity Tolerance   Activity Tolerance Patient tolerated treatment well;Patient limited by fatigue;Patient limited by pain   Nurse Made Aware RN   RUE Assessment   RUE Assessment WFL   LUE Assessment   LUE Assessment WFL   Hand Function   Gross Motor Coordination Functional   Fine Motor Coordination Functional   Psychosocial   Psychosocial (WDL) WDL   Cognition   Overall Cognitive Status WFL   Arousal/Participation Alert;Responsive;Cooperative   Attention Within functional limits   Orientation Level Oriented to person;Oriented to place;Disoriented to time;Oriented to situation  (stated it was February)   Memory Within functional limits   Following Commands Follows one step commands without difficulty   Assessment   Limitation Decreased ADL status;Decreased UE strength;Decreased Safe judgement during ADL;Decreased endurance;Decreased high-level ADLs   Prognosis Good   Assessment Pt is a 69 y.o. male seen for OT evaluation at [facility]. Pt admit AHRF, PMH thyroid cancer, anxiety, depression, lumbar spondylosis s/p L5-S1 ALIF and redo T10-P fusion March 2025, T2DM. PTA, pt states IND with ADL, A with IADL. Pt lives with wife and recently hired part time caregiver to assist with IADL while wife is at work. Pt has FFSU. OT completed extensive review of pt's medical and social history. Personal factors affecting pt at time of IE include: difficulty performing ADLS and limited insight into deficits. Upon evaluation: Pt requires SUP bed mobility, SUP fxl mobility  with RW, SUP sit<>stand with RW, SUP UB ADLs and modA for LB ADLS 2* the following deficits impacting occupational performance: weakness, decreased strength, decreased balance, decreased tolerance, and decreased safety awareness. Full objective findings from OT assessment regarding body systems outlined above. Pt to benefit from continued skilled OT tx while in the hospital to address deficits as defined above and maximize level of functional independence w/ ADL's and functional mobility. Occupational Performance areas to address include: bathing/shower, toilet hygiene, dressing, health maintenance, and functional mobility. Based on findings, pt is of moderate complexity. The patient's raw score on the AM-PAC Daily Activity inpatient short form is  18, standardized score is 38.66 , less than 39.4. Patients at this level are likely to benefit from DC to post-acute rehabilitation services. However, please refer to therapist recommendation for discharge planning given other factors that may influence destination. At this time, OT recommendations at time of discharge are DC with Level III - Low Rehab Resource Intensity resources.   Plan   Treatment Interventions ADL retraining;UE strengthening/ROM;Functional transfer training;Endurance training;Cognitive reorientation;Equipment evaluation/education;Patient/family training;Neuromuscular reeducation;Compensatory technique education;Continued evaluation;Energy conservation;Activityengagement   Goal Expiration Date 05/20/25   OT Frequency 1-2x/wk   Discharge Recommendation   Rehab Resource Intensity Level, OT III (Minimum Resource Intensity)   AM-PAC Daily Activity Inpatient   Lower Body Dressing 2   Bathing 3   Toileting 3   Upper Body Dressing 3   Grooming 3   Eating 4   Daily Activity Raw Score 18   Daily Activity Standardized Score (Calc for Raw Score >=11) 38.66   AM-PAC Applied Cognition Inpatient   Following a Speech/Presentation 4   Understanding Ordinary  Conversation 4   Taking Medications 3   Remembering Where Things Are Placed or Put Away 3   Remembering List of 4-5 Errands 3   Taking Care of Complicated Tasks 3   Applied Cognition Raw Score 20   Applied Cognition Standardized Score 41.76   End of Consult   Education Provided Yes   Patient Position at End of Consult Bedside chair;Bed/Chair alarm activated;All needs within reach   Nurse Communication Nurse aware of consult     Pt will achieve the following goals within 10 days.    *Pt will demonstrate IND/[place] for all ADLs with use of LRAD to promote safe functional independence with daily routine.     *Pt will participate in UE therapeutic exercise in order to maximize strength for ADL transfers.    *Pt will demonstrate improved BUE strength to 5 /5 MMT grade to enhance ADLS and functional transfers.    *Pt will complete bed mobility independently, with bed flat and no side rail to prep for purposeful tasks.    *Pt will perform functional transfers with on/off all surfaces with mod I using DME as needed w/ G balance/safety.    *Pt will improve functional mobility during ADL/IADL/leisure tasks to mod I using DME as needed w/ G balance/safety.    *Pt will demonstrate increased activity tolerance to > 15 min of sustained functional tasks to increase participation in basic self-care and decrease assistance level.     *Pt will verbalize and demonstrate use of energy conservation/ deep breathing technique and work simplification skills during functional activity with no verbal cues.    *Assess DME needs    Marvin Hackett OT 05/10/25 9:59 AM

## 2025-05-10 NOTE — PLAN OF CARE
Problem: Potential for Falls  Goal: Patient will remain free of falls  Description: INTERVENTIONS:- Educate patient/family on patient safety including physical limitations- Instruct patient to call for assistance with activity - Consult OT/PT to assist with strengthening/mobility - Keep Call bell within reach- Keep bed low and locked with side rails adjusted as appropriate- Keep care items and personal belongings within reach- Initiate and maintain comfort rounds- Make Fall Risk Sign visible to staff- Offer Toileting every 2 Hours, in advance of need- Initiate/Maintain bed/chair alarm- Obtain necessary fall risk management equipment: walker- Apply yellow socks and bracelet for high fall risk patients- Consider moving patient to room near nurses station  Outcome: Progressing     Problem: PAIN - ADULT  Goal: Verbalizes/displays adequate comfort level or baseline comfort level  Description: Interventions:- Encourage patient to monitor pain and request assistance- Assess pain using appropriate pain scale- Administer analgesics based on type and severity of pain and evaluate response- Implement non-pharmacological measures as appropriate and evaluate response- Consider cultural and social influences on pain and pain management- Notify physician/advanced practitioner if interventions unsuccessful or patient reports new pain  Outcome: Progressing     Problem: INFECTION - ADULT  Goal: Absence or prevention of progression during hospitalization  Description: INTERVENTIONS:- Assess and monitor for signs and symptoms of infection- Monitor lab/diagnostic results- Monitor all insertion sites, i.e. indwelling lines, tubes, and drains- Monitor endotracheal if appropriate and nasal secretions for changes in amount and color- [place] appropriate cooling/warming therapies per order- Administer medications as ordered- Instruct and encourage patient and family to use good hand hygiene technique- Identify and instruct in appropriate  isolation precautions for identified infection/condition  Outcome: Progressing     Problem: RESPIRATORY - ADULT  Goal: Achieves optimal ventilation and oxygenation  Description: INTERVENTIONS:- Assess for changes in respiratory status- Assess for changes in mentation and behavior- Position to facilitate oxygenation and minimize respiratory effort- Oxygen administered by appropriate delivery if ordered- Initiate smoking cessation education as indicated- Encourage broncho-pulmonary hygiene including cough, deep breathe, Incentive Spirometry- Assess the need for suctioning and aspirate as needed- Assess and instruct to report SOB or any respiratory difficulty- Respiratory Therapy support as indicated  Outcome: Progressing

## 2025-05-10 NOTE — ASSESSMENT & PLAN NOTE
Presented with tachypnea, tachycardia   Most likely in the setting of CHF.  CT chest showed CHF versus may be inflammatory pathology however unlikely patient has pneumonia.  SIRS now resolved.  Patient on doxycycline for the disc abscess

## 2025-05-10 NOTE — ASSESSMENT & PLAN NOTE
Lab Results   Component Value Date    HGBA1C 5.8 (H) 03/11/2025       Recent Labs     05/10/25  0726 05/10/25  1137   POCGLU 90 169*       Blood Sugar Average: Last 72 hrs:  (P) 129.5  Well-controlled on home regimen including Jardiance 10, metformin 1000 daily  Monitor on SSI and-Controlled diet

## 2025-05-10 NOTE — ASSESSMENT & PLAN NOTE
s/p T12-S1 fusion (Dr. Moraes 4/11/2023), c/b L L5-S1 disc/abscess washout 6/14/24   L5-S1 ALIF and redo T10-P fusion with Dr. Call on 3/21/25   Continue home pain regimen oxycodone 15 every 4 hours as needed  Continue home doxycycline 100 twice daily  PT OT

## 2025-05-11 ENCOUNTER — HOME CARE VISIT (OUTPATIENT)
Dept: HOME HEALTH SERVICES | Facility: HOME HEALTHCARE | Age: 70
End: 2025-05-11
Payer: COMMERCIAL

## 2025-05-11 VITALS
OXYGEN SATURATION: 91 % | WEIGHT: 182.54 LBS | SYSTOLIC BLOOD PRESSURE: 160 MMHG | HEART RATE: 78 BPM | RESPIRATION RATE: 16 BRPM | DIASTOLIC BLOOD PRESSURE: 83 MMHG | TEMPERATURE: 97.7 F | BODY MASS INDEX: 27.76 KG/M2

## 2025-05-11 PROBLEM — J96.01 ACUTE HYPOXIC RESPIRATORY FAILURE (HCC): Status: RESOLVED | Noted: 2025-05-09 | Resolved: 2025-05-11

## 2025-05-11 LAB
ANION GAP SERPL CALCULATED.3IONS-SCNC: 7 MMOL/L (ref 4–13)
BUN SERPL-MCNC: 20 MG/DL (ref 5–25)
CALCIUM SERPL-MCNC: 9 MG/DL (ref 8.4–10.2)
CHLORIDE SERPL-SCNC: 97 MMOL/L (ref 96–108)
CO2 SERPL-SCNC: 35 MMOL/L (ref 21–32)
CREAT SERPL-MCNC: 1.04 MG/DL (ref 0.6–1.3)
ERYTHROCYTE [DISTWIDTH] IN BLOOD BY AUTOMATED COUNT: 16.8 % (ref 11.6–15.1)
GFR SERPL CREATININE-BSD FRML MDRD: 73 ML/MIN/1.73SQ M
GLUCOSE SERPL-MCNC: 111 MG/DL (ref 65–140)
GLUCOSE SERPL-MCNC: 132 MG/DL (ref 65–140)
GLUCOSE SERPL-MCNC: 197 MG/DL (ref 65–140)
HCT VFR BLD AUTO: 35.6 % (ref 36.5–49.3)
HGB BLD-MCNC: 10.6 G/DL (ref 12–17)
MCH RBC QN AUTO: 26.6 PG (ref 26.8–34.3)
MCHC RBC AUTO-ENTMCNC: 29.8 G/DL (ref 31.4–37.4)
MCV RBC AUTO: 89 FL (ref 82–98)
PLATELET # BLD AUTO: 358 THOUSANDS/UL (ref 149–390)
PMV BLD AUTO: 10.1 FL (ref 8.9–12.7)
POTASSIUM SERPL-SCNC: 3.6 MMOL/L (ref 3.5–5.3)
RBC # BLD AUTO: 3.99 MILLION/UL (ref 3.88–5.62)
SODIUM SERPL-SCNC: 139 MMOL/L (ref 135–147)
WBC # BLD AUTO: 7.57 THOUSAND/UL (ref 4.31–10.16)

## 2025-05-11 PROCEDURE — 80048 BASIC METABOLIC PNL TOTAL CA: CPT | Performed by: INTERNAL MEDICINE

## 2025-05-11 PROCEDURE — 99239 HOSP IP/OBS DSCHRG MGMT >30: CPT | Performed by: INTERNAL MEDICINE

## 2025-05-11 PROCEDURE — 85027 COMPLETE CBC AUTOMATED: CPT | Performed by: INTERNAL MEDICINE

## 2025-05-11 PROCEDURE — 82948 REAGENT STRIP/BLOOD GLUCOSE: CPT

## 2025-05-11 RX ORDER — FUROSEMIDE 10 MG/ML
40 INJECTION INTRAMUSCULAR; INTRAVENOUS
Status: DISCONTINUED | OUTPATIENT
Start: 2025-05-11 | End: 2025-05-11 | Stop reason: HOSPADM

## 2025-05-11 RX ORDER — POTASSIUM CHLORIDE 1500 MG/1
40 TABLET, EXTENDED RELEASE ORAL ONCE
Status: COMPLETED | OUTPATIENT
Start: 2025-05-11 | End: 2025-05-11

## 2025-05-11 RX ORDER — FUROSEMIDE 20 MG/1
20 TABLET ORAL DAILY
Qty: 30 TABLET | Refills: 0 | Status: SHIPPED | OUTPATIENT
Start: 2025-05-11

## 2025-05-11 RX ADMIN — FINASTERIDE 5 MG: 5 TABLET, FILM COATED ORAL at 08:33

## 2025-05-11 RX ADMIN — POLYETHYLENE GLYCOL 3350 17 G: 17 POWDER, FOR SOLUTION ORAL at 08:38

## 2025-05-11 RX ADMIN — CLONAZEPAM 1 MG: 1 TABLET ORAL at 08:37

## 2025-05-11 RX ADMIN — BUSPIRONE HYDROCHLORIDE 5 MG: 5 TABLET ORAL at 08:35

## 2025-05-11 RX ADMIN — POTASSIUM CHLORIDE 40 MEQ: 1500 TABLET, EXTENDED RELEASE ORAL at 08:33

## 2025-05-11 RX ADMIN — ENOXAPARIN SODIUM 40 MG: 40 INJECTION SUBCUTANEOUS at 08:38

## 2025-05-11 RX ADMIN — FUROSEMIDE 40 MG: 10 INJECTION, SOLUTION INTRAVENOUS at 09:03

## 2025-05-11 RX ADMIN — GUAIFENESIN 600 MG: 600 TABLET ORAL at 08:33

## 2025-05-11 RX ADMIN — INSULIN LISPRO 2 UNITS: 100 INJECTION, SOLUTION INTRAVENOUS; SUBCUTANEOUS at 11:53

## 2025-05-11 RX ADMIN — SENNOSIDES 8.6 MG: 8.6 TABLET, FILM COATED ORAL at 08:43

## 2025-05-11 RX ADMIN — NIFEDIPINE 30 MG: 30 TABLET, FILM COATED, EXTENDED RELEASE ORAL at 08:37

## 2025-05-11 RX ADMIN — FOLIC ACID 2000 MCG: 1 TABLET ORAL at 08:41

## 2025-05-11 RX ADMIN — NIFEDIPINE 90 MG: 30 TABLET, FILM COATED, EXTENDED RELEASE ORAL at 08:33

## 2025-05-11 RX ADMIN — PANTOPRAZOLE SODIUM 40 MG: 40 TABLET, DELAYED RELEASE ORAL at 08:32

## 2025-05-11 RX ADMIN — DOXYCYCLINE 100 MG: 100 CAPSULE ORAL at 08:34

## 2025-05-11 RX ADMIN — METOCLOPRAMIDE 10 MG: 10 TABLET ORAL at 08:32

## 2025-05-11 RX ADMIN — OXYCODONE HYDROCHLORIDE 15 MG: 5 TABLET ORAL at 02:41

## 2025-05-11 RX ADMIN — GABAPENTIN 400 MG: 400 CAPSULE ORAL at 08:34

## 2025-05-11 RX ADMIN — ESCITALOPRAM OXALATE 20 MG: 20 TABLET ORAL at 08:34

## 2025-05-11 RX ADMIN — METOPROLOL SUCCINATE 50 MG: 50 TABLET, EXTENDED RELEASE ORAL at 08:35

## 2025-05-11 NOTE — ASSESSMENT & PLAN NOTE
s/p T12-S1 fusion (Dr. Moraes 4/11/2023), c/b L L5-S1 disc/abscess washout 6/14/24   L5-S1 ALIF and redo T10-P fusion with Dr. Call on 3/21/25   Continue home pain regimen oxycodone 15 every 4 hours as needed  Continue home doxycycline 100 twice daily  Patient ambulating with a walker which is his baseline.  Will have PT/OT outpatient as before.

## 2025-05-11 NOTE — ASSESSMENT & PLAN NOTE
Lab Results   Component Value Date    HGBA1C 5.8 (H) 03/11/2025       Recent Labs     05/10/25  1137 05/10/25  1648 05/11/25  0812 05/11/25  1055   POCGLU 169* 131 111 197*       Blood Sugar Average: Last 72 hrs:  (P) 139.6  Continue home gabapentin  Continue home medication

## 2025-05-11 NOTE — ASSESSMENT & PLAN NOTE
Presented with oxygen level of 70%, was placed on nasal cannula oxygen  Likely in the setting of acute on chronic CHF  This morning patient saturating at 97% at room air.  His oxygen saturation remained greater than 92% with ambulation.

## 2025-05-11 NOTE — ASSESSMENT & PLAN NOTE
Wt Readings from Last 3 Encounters:   05/11/25 82.8 kg (182 lb 8.7 oz)   04/29/25 76.7 kg (169 lb 3.2 oz)   04/23/25 79.5 kg (175 lb 4 oz)     Presented with shortness of breath  CT chest showed interstitial and alveolar pulmonary edema with moderate bilateral pleural effusions  BNP mildly elevated at 436  Troponin negative, no CP   Patient was placed on nasal cannula oxygen given saturation in the 70s  Last echo 7/15/2024 showed 65% ejection fraction with diastolic dysfunction 1.  Patient has history of lower extremity edema and had Lasix as needed however he did not take any.   Per wife he had recently back surgery and he became more short of breath and with lower extremity edema.  Suspect may be iatrogenic fluid overload and salt indiscretions.  Started on Lasix 40 mg IV twice daily, with good urine output and about 12 pounds weight loss.  Patient with clear lungs, no significant lower extremity edema and with stable oxygen saturation at room air  Will discharge on Lasix 20 mg daily.  Follow-up with PCP and follow-up with cardiology.

## 2025-05-11 NOTE — ASSESSMENT & PLAN NOTE
Lab Results   Component Value Date    HGBA1C 5.8 (H) 03/11/2025       Recent Labs     05/10/25  1137 05/10/25  1648 05/11/25  0812 05/11/25  1055   POCGLU 169* 131 111 197*       Blood Sugar Average: Last 72 hrs:  (P) 139.6  Well-controlled on home regimen including Jardiance 10, metformin 1000 daily  Continue

## 2025-05-11 NOTE — UTILIZATION REVIEW
Initial Clinical Review    Admission: Date/Time/Statement:   Admission Orders (From admission, onward)       Ordered        05/09/25 2059  INPATIENT ADMISSION  Once                          Orders Placed This Encounter   Procedures    INPATIENT ADMISSION     Standing Status:   Standing     Number of Occurrences:   1     Level of Care:   Med Surg [16]     Estimated length of stay:   More than 2 Midnights     Certification:   I certify that inpatient services are medically necessary for this patient for a duration of greater than two midnights. See H&P and MD Progress Notes for additional information about the patient's course of treatment.     ED Arrival Information       Expected   -    Arrival   5/9/2025 17:45    Acuity   Emergent              Means of arrival   Wheelchair    Escorted by   Spouse    Service   Hospitalist    Admission type   Emergency              Arrival complaint   sob             Chief Complaint   Patient presents with    Shortness of Breath       Initial Presentation: 69 y.o. male to the ED from home with complaints of shortness of breath. Admitted to inpatient for acute hypoxic respiratory failure.  H/O type 2 diabetes, hypertension, thyroid cancer status post partial thyroidectomy, anxiety/depression, recent spine surgery.  ON arrival to the ED, lactate 2.1, wbcs 11.2, pulse ox 77% requiring 8L midflow.  Mag 1.8. CT findings and findings of fluid overload on exam concerning more for CHF exacerbation.  Give IV lasix.  CT chest findings likely consistent with fluid overload versus viral infection.  Rales heard in lungs.        Anticipated Length of Stay/Certification Statement:  Patient will be admitted on an inpatient basis with an anticipated length of stay of greater than 2 midnights secondary to hypoxia.     Date: 5/10   Day 2: Weaned oxygen to 2LNC.  Continue to wean as able.  Continue lasix.  Bilateral lower extremity edema present. PT/OT recommending VNA.  Will require home O2 on dc.      Date: 5/11  Day 3: Has surpassed a 2nd midnight with active treatments and services.Initially admitted for acute hypoxic respi failure, has been weaned to 2LNc and treated with IV lasix. .  Case management for safe dc planning.           ED Treatment-Medication Administration from 05/09/2025 1745 to 05/09/2025 2133         Date/Time Order Dose Route Action     05/09/2025 1925 magnesium sulfate 2 g/50 mL IVPB (premix) 2 g 2 g Intravenous New Bag     05/09/2025 1953 iohexol (OMNIPAQUE) 350 MG/ML injection (MULTI-DOSE) 100 mL 100 mL Intravenous Given     05/09/2025 2110 azithromycin (ZITHROMAX) 500 mg in sodium chloride 0.9% 250mL IVPB 500 mg 500 mg Intravenous New Bag     05/09/2025 2110 cefTRIAXone (ROCEPHIN) IVPB (premix in dextrose) 2,000 mg 50 mL 2,000 mg Intravenous New Bag            Scheduled Medications:  busPIRone, 5 mg, Oral, BID  clonazePAM, 1 mg, Oral, BID  doxycycline hyclate, 100 mg, Oral, BID  enoxaparin, 40 mg, Subcutaneous, Daily  escitalopram, 20 mg, Oral, Daily  finasteride, 5 mg, Oral, Daily  folic acid, 2,000 mcg, Oral, Daily  furosemide, 40 mg, Intravenous, BID (diuretic)  gabapentin, 400 mg, Oral, BID  guaiFENesin, 600 mg, Oral, Q12H CHRISTIE  insulin lispro, 1-6 Units, Subcutaneous, TID AC  metoclopramide, 10 mg, Oral, BID AC  metoprolol succinate, 50 mg, Oral, BID  mirtazapine, 45 mg, Oral, HS  NIFEdipine, 30 mg, Oral, Daily  NIFEdipine, 90 mg, Oral, Daily  pantoprazole, 40 mg, Oral, BID  polyethylene glycol, 17 g, Oral, Daily  pravastatin, 20 mg, Oral, Daily With Dinner  senna, 8.6 mg, Oral, BID  tamsulosin, 0.4 mg, Oral, Daily With Dinner      Continuous IV Infusions:     PRN Meds:  acetaminophen, 325 mg, Oral, Q8H PRN  cyclobenzaprine, 5 mg, Oral, TID PRN  oxyCODONE, 15 mg, Oral, Q4H PRN      ED Triage Vitals   Temperature Pulse Respirations Blood Pressure SpO2 Pain Score   05/09/25 1805 05/09/25 1805 05/09/25 1805 05/09/25 1805 05/09/25 1805 05/09/25 2137   98.2 °F (36.8 °C) 87 20 126/61  (!) 77 % 10 - Worst Possible Pain     Weight (last 2 days)       Date/Time Weight    05/11/25 0557 82.8 (182.54)    05/10/25 0600 88 (194.01)            Vital Signs (last 3 days)       Date/Time Temp Pulse Resp BP MAP (mmHg) SpO2 Calculated FIO2 (%) - Nasal Cannula O2 Flow Rate (L/min) Nasal Cannula O2 Flow Rate (L/min) O2 Device O2 Interface Device Patient Position - Orthostatic VS Pain    05/11/25 0830 -- -- -- -- -- 91 % -- -- -- None (Room air) -- -- 6    05/11/25 08:14:03 97.7 °F (36.5 °C) 78 -- 160/83 109 93 % -- -- -- -- -- -- --    05/11/25 0241 -- -- -- -- -- -- -- -- -- -- -- -- 10 - Worst Possible Pain    05/11/25 0100 -- -- -- -- -- -- 24 -- 1 L/min Nasal cannula -- -- 10 - Worst Possible Pain    05/10/25 2304 -- -- -- -- -- -- -- -- -- -- -- -- 10 - Worst Possible Pain    05/10/25 21:28:53 -- 84 -- 124/70 88 92 % -- -- -- -- -- -- --    05/10/25 2119 -- 97 -- 124/70 -- -- -- -- -- -- -- -- --    05/10/25 1922 -- -- -- -- -- -- -- -- -- -- -- -- 10 - Worst Possible Pain    05/10/25 15:18:40 97.9 °F (36.6 °C) 83 16 133/68 90 91 % -- -- -- -- -- Lying --    05/10/25 1300 -- -- -- -- -- -- 24 1 L/min 1 L/min Nasal cannula -- -- --    05/10/25 1246 -- -- -- -- -- -- 28 -- 2 L/min Nasal cannula -- -- --    05/10/25 1122 -- -- -- -- -- 96 % -- -- -- -- MFNC prongs -- --    05/10/25 1118 -- -- -- -- -- -- 36 4 L/min 4 L/min Nasal cannula -- -- --    05/10/25 0915 -- -- -- -- -- 96 % 36 4 L/min 4 L/min Mid flow nasal cannula -- -- No Pain    05/10/25 0825 -- -- -- -- -- -- -- -- -- -- -- -- 10 - Worst Possible Pain    05/10/25 07:33:56 -- 88 -- 156/79 105 96 % 36 -- 4 L/min Mid flow nasal cannula NC prongs -- --    05/10/25 07:27:35 97.7 °F (36.5 °C) 84 18 157/124 135 97 % 44 -- 6 L/min Mid flow nasal cannula -- Lying --    05/10/25 0340 -- -- -- -- -- -- 44 -- 6 L/min Mid flow nasal cannula MFNC prongs -- --    05/10/25 0204 -- -- -- -- -- -- 44 -- 6 L/min Mid flow nasal cannula -- -- --    05/10/25 0104 --  -- -- -- -- -- -- -- -- -- -- -- 10 - Worst Possible Pain    05/09/25 2301 -- -- -- -- -- -- -- -- -- -- -- -- 9 05/09/25 21:48:15 97.7 °F (36.5 °C) 83 -- 139/75 96 92 % 60 -- 10 L/min Mid flow nasal cannula -- Lying --    05/09/25 2140 -- -- -- -- -- 94 % 60 -- 10 L/min Mid flow nasal cannula -- -- --    05/09/25 2137 -- -- -- -- -- -- -- -- -- -- -- -- 10 - Worst Possible Pain    05/09/25 2030 -- 76 18 140/73 -- 96 % -- -- -- -- -- -- --    05/09/25 2006 -- -- -- -- -- 96 % 52 -- 8 L/min Mid flow nasal cannula MFNC prongs -- --    05/09/25 1934 -- -- -- -- -- -- -- 10 L/min -- Mid flow nasal cannula -- -- --    05/09/25 1930 -- 84 20 143/70 99 94 % -- -- -- -- -- -- --    05/09/25 1850 -- -- -- -- -- -- 60 -- 10 L/min Mid flow nasal cannula -- -- --    05/09/25 1845 -- 83 18 -- -- 94 % 52 -- 8 L/min Nasal cannula -- -- --    05/09/25 1830 -- 93 -- 155/70 105 70 %  -- -- -- None (Room air) -- -- --    05/09/25 1810 -- -- -- -- -- 91 % 36 -- 4 L/min Nasal cannula -- -- --    05/09/25 1805 98.2 °F (36.8 °C) 87 20 126/61 -- 77 % -- -- -- None (Room air) -- Sitting --              Pertinent Labs/Diagnostic Test Results:   Radiology:  CTA chest pe study   Final Interpretation by Tommy Noonan MD (05/09 2013)      1.  No evidence of pulmonary embolism.   2.  Interstitial and alveolar pulmonary edema with moderate bilateral pleural effusions and compressive atelectasis of the lower lobes. A component of superimposed infection is possible given the groundglass but alveolar edema alone could have this    appearance.      The study was marked in EPIC for immediate notification.      Workstation performed: [id number]         XR chest portable   Final Interpretation by Redd Hill MD (05/10 0818)      Pulmonary interstitial edema, better characterized on subsequent same day CT study. Superimposed infection could be considered in the appropriate clinical context. Please refer to full CT report for further discussion.             Resident: Nicolas Maddox I, the attending radiologist, have reviewed the images and agree with the final report above.      Workstation performed: [id number]           Cardiology:    Results from last 7 days   Lab Units 05/09/25  1839   SARS-COV-2  Negative     Results from last 7 days   Lab Units 05/11/25  0246 05/10/25  0357 05/09/25 1837 05/08/25  1042   WBC Thousand/uL 7.57 8.30 11.20* 9.35   HEMOGLOBIN g/dL 10.6* 10.5* 9.8* 10.1*   HEMATOCRIT % 35.6* 34.2* 33.1* 34.9*   PLATELETS Thousands/uL 358 338 337 356   TOTAL NEUT ABS Thousands/µL  --   --  9.40* 6.80         Results from last 7 days   Lab Units 05/11/25  0246 05/10/25  0357 05/09/25  1837 05/08/25  1042   SODIUM mmol/L 139 140 137 137   POTASSIUM mmol/L 3.6 3.8 4.0 4.3   CHLORIDE mmol/L 97 102 102 101   CO2 mmol/L 35* 28 26 25   ANION GAP mmol/L 7 10 9 11   BUN mg/dL 20 23 28* 30*   CREATININE mg/dL 1.04 0.97 1.09 1.10   EGFR ml/min/1.73sq m 73 80 69 69   CALCIUM mg/dL 9.0 9.1 9.0 9.1   MAGNESIUM mg/dL  --  2.0 1.8*  --      Results from last 7 days   Lab Units 05/09/25 1837 05/08/25  1042   AST U/L 10* 13   ALT U/L 8 8   ALK PHOS U/L 85 83   TOTAL PROTEIN g/dL 7.0 6.6   ALBUMIN g/dL 3.9 3.6   TOTAL BILIRUBIN mg/dL 0.32 0.46     Results from last 7 days   Lab Units 05/11/25  1055 05/11/25  0812 05/10/25  1648 05/10/25  1137 05/10/25  0726   POC GLUCOSE mg/dl 197* 111 131 169* 90     Results from last 7 days   Lab Units 05/11/25  0246 05/10/25  0357 05/09/25  1837   GLUCOSE RANDOM mg/dL 132 105 184*             Beta- Hydroxybutyrate   Date Value Ref Range Status   10/06/2024 2.04 (H) 0.02 - 0.27 mmol/L Final        Results from last 7 days   Lab Units 05/09/25 2036 05/09/25 1837   HS TNI 0HR ng/L  --  6   HS TNI 2HR ng/L 6  --    HSTNI D2 ng/L 0  --      Results from last 7 days   Lab Units 05/09/25 1837   D-DIMER QUANTITATIVE ug/ml FEU 1.98*     Results from last 7 days   Lab Units 05/09/25  1837   PROTIME seconds 14.6   INR  1.08   PTT  seconds 31         Results from last 7 days   Lab Units 05/09/25  1837   PROCALCITONIN ng/ml 0.14     Results from last 7 days   Lab Units 05/09/25 2036 05/09/25 1837   LACTIC ACID mmol/L 0.9 2.1*             Results from last 7 days   Lab Units 05/09/25 1837   BNP pg/mL 436*           Results from last 7 days   Lab Units 05/09/25  1924   CLARITY UA  Clear   COLOR UA  Yellow   SPEC GRAV UA  1.020   PH UA  5.5   GLUCOSE UA mg/dl 300 (3/10%)*   KETONES UA mg/dl Negative   BLOOD UA  Negative   PROTEIN UA mg/dl Trace*   NITRITE UA  Negative   BILIRUBIN UA  Negative   UROBILINOGEN UA (BE) mg/dl <2.0   LEUKOCYTES UA  Negative   WBC UA /hpf 0-1   RBC UA /hpf 0-1   BACTERIA UA /hpf Occasional   EPITHELIAL CELLS WET PREP /hpf Occasional   MUCUS THREADS  Occasional*     Results from last 7 days   Lab Units 05/09/25 1839   INFLUENZA A PCR  Negative   INFLUENZA B PCR  Negative   RSV PCR  Negative       Results from last 7 days   Lab Units 05/09/25 1926 05/09/25 1837   BLOOD CULTURE  No Growth at 24 hrs. No Growth at 24 hrs.       Past Medical History:   Diagnosis Date    Allergic     Anemia     Anxiety     Arthritis     Cancer (HCC)     Chronic back pain     Depression     Diabetes mellitus (HCC)     Hypertension     Liver disease     Mitral valve prolapse     Peripheral neuropathy     Neuropathy    PONV (postoperative nausea and vomiting)     Thyroid disease      Present on Admission:   Thyroid cancer (HCC)   Type 2 diabetes mellitus with hyperglycemia, without long-term current use of insulin (HCC)   Hypertension   Diabetic peripheral neuropathy (HCC)   Lumbar spondylosis   Anxiety and depression   (Resolved) SIRS (systemic inflammatory response syndrome) (HCC)      Admitting Diagnosis: SOB (shortness of breath) [R06.02]  Hypoxia [R09.02]  Age/Sex: 69 y.o. male    Network Utilization Review Department  ATTENTION: Please call with any questions or concerns to [phone] and carefully listen to the prompts so that you  are directed to the right person. All voicemails are confidential.   For Discharge needs, contact Care Management DC Support Team at [phone] opt. 2  Send all requests for admission clinical reviews, approved or denied determinations and any other requests to dedicated fax number below belonging to the campus where the patient is receiving treatment. List of dedicated fax numbers for the Facilities:  FACILITY NAME UR FAX NUMBER   ADMISSION DENIALS (Administrative/Medical Necessity) [phone]   DISCHARGE SUPPORT TEAM (NETWORK) [phone]   PARENT CHILD HEALTH (Maternity/NICU/Pediatrics) [phone]   [facility] [phone]   [facility] [phone]   [facility] [phone]   [facility] [phone]   [facility] [phone]   [facility] [phone]   [facility] [phone]   [facility] [phone]   [facility] [phone]   [facility] [phone]   [facility] [phone]   [facility] [phone]

## 2025-05-11 NOTE — UTILIZATION REVIEW
NOTIFICATION OF INPATIENT ADMISSION   AUTHORIZATION REQUEST   SERVICING FACILITY:   [place] [address]  Tax ID: [id number]  NPI: [id number] ATTENDING PROVIDER:  Attending Name and NPI#: Leah He Md [[id number]]  Address: [address] [place] [address]  Phone: [phone]   ADMISSION INFORMATION:  Place of Service: Inpatient [facility]  Place of Service Code: 21  Inpatient Admission Date/Time: 5/9/25  8:59 PM  Discharge Date/Time: No discharge date for patient encounter.  Admitting Diagnosis Code/Description:  SOB (shortness of breath) [R06.02]  Hypoxia [R09.02]     UTILIZATION REVIEW CONTACT:  Anaid Guardado, Utilization   Network Utilization Review Department  Phone: [phone]  Fax: [phone]  Email: [email or web address]  Contact for approvals/pending authorizations, clinical reviews, and discharge.     PHYSICIAN ADVISORY SERVICES:  Medical Necessity Denial & Gejp-ps-Crfb Review  Phone: [phone]  Fax: [phone]  Email: [email or web address]     DISCHARGE SUPPORT TEAM:  For Patients Discharge Needs & Updates  Phone: [phone] opt. 2 Fax: [phone]  Email: [email or web address]

## 2025-05-11 NOTE — PLAN OF CARE
Problem: Potential for Falls  Goal: Patient will remain free of falls  Description: INTERVENTIONS:- Educate patient/family on patient safety including physical limitations- Instruct patient to call for assistance with activity - Consult OT/PT to assist with strengthening/mobility - Keep Call bell within reach- Keep bed low and locked with side rails adjusted as appropriate- Keep care items and personal belongings within reach- Initiate and maintain comfort rounds- Make Fall Risk Sign visible to staff- Offer Toileting every  Hours, in advance of need- Initiate/Maintain alarm- Obtain necessary fall risk management equipment: - Apply yellow socks and bracelet for high fall risk patients- Consider moving patient to room near nurses station  Outcome: Progressing     Problem: PAIN - ADULT  Goal: Verbalizes/displays adequate comfort level or baseline comfort level  Description: Interventions:- Encourage patient to monitor pain and request assistance- Assess pain using appropriate pain scale- Administer analgesics based on type and severity of pain and evaluate response- Implement non-pharmacological measures as appropriate and evaluate response- Consider cultural and social influences on pain and pain management- Notify physician/advanced practitioner if interventions unsuccessful or patient reports new pain  Outcome: Progressing     Problem: INFECTION - ADULT  Goal: Absence or prevention of progression during hospitalization  Description: INTERVENTIONS:- Assess and monitor for signs and symptoms of infection- Monitor lab/diagnostic results- Monitor all insertion sites, i.e. indwelling lines, tubes, and drains- Monitor endotracheal if appropriate and nasal secretions for changes in amount and color- [place] appropriate cooling/warming therapies per order- Administer medications as ordered- Instruct and encourage patient and family to use good hand hygiene technique- Identify and instruct in appropriate isolation  precautions for identified infection/condition  Outcome: Progressing  Goal: Absence of fever/infection during neutropenic period  Description: INTERVENTIONS:- Monitor WBC  Outcome: Progressing     Problem: RESPIRATORY - ADULT  Goal: Achieves optimal ventilation and oxygenation  Description: INTERVENTIONS:- Assess for changes in respiratory status- Assess for changes in mentation and behavior- Position to facilitate oxygenation and minimize respiratory effort- Oxygen administered by appropriate delivery if ordered- Initiate smoking cessation education as indicated- Encourage broncho-pulmonary hygiene including cough, deep breathe, Incentive Spirometry- Assess the need for suctioning and aspirate as needed- Assess and instruct to report SOB or any respiratory difficulty- Respiratory Therapy support as indicated  Outcome: Progressing

## 2025-05-12 ENCOUNTER — TRANSITIONAL CARE MANAGEMENT (OUTPATIENT)
Dept: FAMILY MEDICINE CLINIC | Facility: CLINIC | Age: 70
End: 2025-05-12

## 2025-05-12 ENCOUNTER — HOME CARE VISIT (OUTPATIENT)
Dept: HOME HEALTH SERVICES | Facility: HOME HEALTHCARE | Age: 70
End: 2025-05-12
Payer: COMMERCIAL

## 2025-05-12 DIAGNOSIS — F32.A ANXIETY AND DEPRESSION: ICD-10-CM

## 2025-05-12 DIAGNOSIS — F41.9 ANXIETY AND DEPRESSION: ICD-10-CM

## 2025-05-12 RX ORDER — CLONAZEPAM 1 MG/1
1 TABLET ORAL 2 TIMES DAILY
Qty: 60 TABLET | Refills: 0 | Status: SHIPPED | OUTPATIENT
Start: 2025-05-12

## 2025-05-12 NOTE — TELEPHONE ENCOUNTER
Prescribed by old PCP  Reason for call:   [x] Refill   [] Prior Auth  [] Other:     Office:   [x] PCP/Provider -  [facility]  Authorized By: RUIZ Barragan    [] Specialty/Provider -     Medication: clonazePAM (KlonoPIN) 1 mg tablet    Dose/Frequency: Take 1 tablet (1 mg total) by mouth 2 (two) times a day & 3rd pill PRN for anxiety,    Quantity: 60    Pharmacy: [address]   Does the patient have enough for 3 days?   [x] Yes   [] No - Send as HP to POD    Mail Away Pharmacy   Does the patient have enough for 10 days?   [] Yes   [] No - Send as HP to POD

## 2025-05-12 NOTE — UTILIZATION REVIEW
NOTIFICATION OF ADMISSION DISCHARGE   This is a Notification of Discharge from [facility]. Please be advised that this patient has been discharge from our facility. Below you will find the admission and discharge date and time including the patient’s disposition.   UTILIZATION REVIEW CONTACT:  Utilization Review Assistants  Network Utilization Review Department  Phone: [phone] x carefully listen to the prompts. All voicemails are confidential.  Email: [email or web address]     ADMISSION INFORMATION  PRESENTATION DATE: 5/9/2025  6:22 PM  OBERVATION ADMISSION DATE: N/A  INPATIENT ADMISSION DATE: 5/9/25  8:59 PM   DISCHARGE DATE: 5/11/2025  2:24 PM   DISPOSITION:Home/Self Care    Network Utilization Review Department  ATTENTION: Please call with any questions or concerns to [phone] and carefully listen to the prompts so that you are directed to the right person. All voicemails are confidential.   For Discharge needs, contact Care Management DC Support Team at [phone] opt. 2  Send all requests for admission clinical reviews, approved or denied determinations and any other requests to dedicated fax number below belonging to the campus where the patient is receiving treatment. List of dedicated fax numbers for the Facilities:  FACILITY NAME UR FAX NUMBER   ADMISSION DENIALS (Administrative/Medical Necessity) [phone]   DISCHARGE SUPPORT TEAM ([facility]) [phone]   PARENT CHILD HEALTH (Maternity/NICU/Pediatrics) [phone]   [facility] [phone]   [facility] [phone]   [facility] [phone]   [facility] [phone]   [facility] [phone]   [facility] [phone]   [facility] [phone]   [facility] [phone]   [facility]  [facility] [phone]   [facility] [phone]   [facility] [phone]   [facility] [phone]

## 2025-05-13 ENCOUNTER — HOME CARE VISIT (OUTPATIENT)
Dept: HOME HEALTH SERVICES | Facility: HOME HEALTHCARE | Age: 70
End: 2025-05-13
Attending: INTERNAL MEDICINE
Payer: COMMERCIAL

## 2025-05-13 VITALS
RESPIRATION RATE: 20 BRPM | WEIGHT: 184 LBS | BODY MASS INDEX: 27.89 KG/M2 | SYSTOLIC BLOOD PRESSURE: 130 MMHG | TEMPERATURE: 98.6 F | HEIGHT: 68 IN | HEART RATE: 68 BPM | OXYGEN SATURATION: 97 % | DIASTOLIC BLOOD PRESSURE: 64 MMHG

## 2025-05-13 PROCEDURE — G0299 HHS/HOSPICE OF RN EA 15 MIN: HCPCS

## 2025-05-14 LAB
BACTERIA BLD CULT: NORMAL
BACTERIA BLD CULT: NORMAL

## 2025-05-14 NOTE — PROGRESS NOTES
Patient ID: Juan San is a 69 y.o. male Date of Birth 1955       Chief Complaint   Patient presents with    [facility]             Diagnosis:  1. Diabetic polyneuropathy associated with type 2 diabetes mellitus (HCC)  -     Diabetic Shoe  2. Onychomycosis  3. Hyperkeratosis  4. Edema of both lower legs       Annual  Diabetic Foot exam with socks and shoes removed bilaterally.  I personally viewed patient's medical records, PCP notes, endocrinology notes, recent hospitalization at [facility], I reviewed pertinent blood work and imaging.  High risk  foot precautions reviewed with patient including the need to wear protective shoegear at all times when walking including in the home, the need to check feet all surfaces daily with a mirror and report and skin breaks to podiatry, the need to apply an emollient to skin of feet daily.  We discussed proper glycemic control and the risk of pedal complications with hyperglycemia.   Toenails were debrided manually mechanically x 10 without incident.  Hyperkeratosis x 1 was trimmed without incident.  Patient was given a prescription for 1 pair diabetic shoes, 3 pair custom molded inserts, he might require custom as he does have significant left foot deformity.  Patient with bilateral lower extremity edema, I have advised him he needs to wear his compression stockings during all waking hours.  He was recently hospitalized for acute on chronic CHF at [facility], have advised him he needs to weigh himself every day, if there is a greater than 5 pound weight gain in 1 week he needs to call his cardiologist to adjust his dose of diuretic ASAP.  Patient to moisturize top and bottom of feet, avoid in between the toes, petroleum jelly at night to the left heel area of hyperkeratosis.  Patient wife understand and agree with the plan will follow-up in 6 months.        Subjective:   5/15/25 - Juan presents today for annual diabetic foot  "examination, he is known to me from recent hospitalization and [facility] stasis ulcerations.  He is a type II diabetic with most recent HbA1c was 5.8 on 3/11/2025.  FBS 2 days ago was 220.  Patient presents today wearing slippers which are rubber with no support.  Most recent visit with PCP was 4/29/2025.          The following portions of the patient's history were reviewed and updated as appropriate: allergies, current medications, past family history, past medical history, past social history, past surgical history, and problem list.        Objective:  Ht 5' 8\" (1.727 m) Comment: VERBAL  Wt 82.6 kg (182 lb)   BMI 27.67 kg/m²     Review of Systems   Constitutional:  Negative for chills and fever.   HENT:  Negative for ear pain and sore throat.    Eyes:  Negative for pain and visual disturbance.   Respiratory:  Negative for cough and shortness of breath.    Cardiovascular:  Negative for chest pain and palpitations.   Gastrointestinal:  Negative for abdominal pain and vomiting.   Genitourinary:  Negative for dysuria and hematuria.   Musculoskeletal:  Positive for gait problem. Negative for arthralgias and back pain.   Skin:  Negative for color change and rash.   Neurological:  Positive for numbness. Negative for seizures and syncope.   All other systems reviewed and are negative.      Physical Exam  Constitutional:       Appearance: Normal appearance. He is normal weight.   HENT:      Head: Normocephalic and atraumatic.      Right Ear: External ear normal.      Left Ear: External ear normal.      Nose: Nose normal.      Mouth/Throat:      Mouth: Mucous membranes are moist.      Pharynx: Oropharynx is clear.     Eyes:      Conjunctiva/sclera: Conjunctivae normal.      Pupils: Pupils are equal, round, and reactive to light.       Cardiovascular:      Pulses: Normal pulses. no weak pulses.           Dorsalis pedis pulses are 2+ on the right side and 2+ on the left side.        " Posterior tibial pulses are 2+ on the right side and 2+ on the left side.   Pulmonary:      Effort: Pulmonary effort is normal.     Musculoskeletal:      Cervical back: Normal range of motion.      Right lower le+ Edema present.      Left lower le+ Edema present.      Left foot: Deformity present.   Feet:      Right foot:      Protective Sensation: 10 sites tested.  0 sites sensed.      Skin integrity: Skin integrity normal. No ulcer, skin breakdown, erythema, warmth, callus or dry skin.      Toenail Condition: Right toenails are abnormally thick and long. Fungal disease present.     Left foot:      Protective Sensation: 10 sites tested.  0 sites sensed.      Skin integrity: Callus present. No ulcer, skin breakdown, erythema, warmth or dry skin.      Toenail Condition: Left toenails are abnormally thick and long. Fungal disease present.    Skin:     General: Skin is warm and dry.      Capillary Refill: Capillary refill takes less than 2 seconds.     Neurological:      General: No focal deficit present.      Mental Status: He is alert and oriented to person, place, and time. Mental status is at baseline.     Psychiatric:         Mood and Affect: Mood normal.         Behavior: Behavior normal.         Thought Content: Thought content normal.         Judgment: Judgment normal.          Diabetic Foot Exam    Patient's shoes and socks removed.    Right Foot/Ankle   Right Foot Inspection  Skin Exam: skin normal and skin intact. No dry skin, no warmth, no callus, no erythema, no maceration, no abnormal color, no pre-ulcer, no ulcer and no callus.     Toe Exam: ROM and strength within normal limits.     Sensory   Vibration: absent  Proprioception: absent  Monofilament testing: absent    Vascular  Capillary refills: < 3 seconds  The right DP pulse is 2+. The right PT pulse is 2+.     Left Foot/Ankle  Left Foot Inspection  Skin Exam: skin normal, skin intact and callus. No dry skin, no warmth, no erythema, no  "maceration, normal color, no pre-ulcer and no ulcer.     Toe Exam: ROM and strength within normal limits.     Sensory   Vibration: absent  Proprioception: absent  Monofilament testing: absent    Vascular  Capillary refills: < 3 seconds  The left DP pulse is 2+. The left PT pulse is 2+.     Assign Risk Category  Deformity present  Loss of protective sensation  No weak pulses  Risk: 2          No pertinent results found.      Yoly Ward, DPM, DPM, FACFAS    Portions of the record may have been created with voice recognition software. Occasional wrong word or \"sound a like\" substitutions may have occurred due to the inherent limitations of voice recognition software. Read the chart carefully and recognize, using context, where substitutions have occurred.  "

## 2025-05-15 ENCOUNTER — PROCEDURE VISIT (OUTPATIENT)
Dept: PODIATRY | Facility: CLINIC | Age: 70
End: 2025-05-15
Payer: COMMERCIAL

## 2025-05-15 ENCOUNTER — HOME CARE VISIT (OUTPATIENT)
Dept: HOME HEALTH SERVICES | Facility: HOME HEALTHCARE | Age: 70
End: 2025-05-15
Payer: COMMERCIAL

## 2025-05-15 VITALS — HEART RATE: 82 BPM | OXYGEN SATURATION: 94 % | SYSTOLIC BLOOD PRESSURE: 122 MMHG | DIASTOLIC BLOOD PRESSURE: 60 MMHG

## 2025-05-15 VITALS
BODY MASS INDEX: 27.98 KG/M2 | SYSTOLIC BLOOD PRESSURE: 118 MMHG | WEIGHT: 184 LBS | TEMPERATURE: 97.5 F | HEART RATE: 78 BPM | RESPIRATION RATE: 18 BRPM | OXYGEN SATURATION: 92 % | DIASTOLIC BLOOD PRESSURE: 60 MMHG

## 2025-05-15 VITALS — BODY MASS INDEX: 27.58 KG/M2 | HEIGHT: 68 IN | WEIGHT: 182 LBS

## 2025-05-15 DIAGNOSIS — E11.42 DIABETIC POLYNEUROPATHY ASSOCIATED WITH TYPE 2 DIABETES MELLITUS (HCC): Primary | ICD-10-CM

## 2025-05-15 DIAGNOSIS — B35.1 ONYCHOMYCOSIS: ICD-10-CM

## 2025-05-15 DIAGNOSIS — L85.9 HYPERKERATOSIS: ICD-10-CM

## 2025-05-15 DIAGNOSIS — R60.0 EDEMA OF BOTH LOWER LEGS: ICD-10-CM

## 2025-05-15 PROCEDURE — 99214 OFFICE O/P EST MOD 30 MIN: CPT | Performed by: PODIATRIST

## 2025-05-15 PROCEDURE — 11055 PARING/CUTG B9 HYPRKER LES 1: CPT | Performed by: PODIATRIST

## 2025-05-15 PROCEDURE — G0151 HHCP-SERV OF PT,EA 15 MIN: HCPCS

## 2025-05-15 PROCEDURE — G0299 HHS/HOSPICE OF RN EA 15 MIN: HCPCS

## 2025-05-15 PROCEDURE — 11721 DEBRIDE NAIL 6 OR MORE: CPT | Performed by: PODIATRIST

## 2025-05-16 DIAGNOSIS — Z98.1 STATUS POST LUMBAR SPINAL FUSION: Primary | ICD-10-CM

## 2025-05-16 DIAGNOSIS — M54.16 LUMBAR RADICULOPATHY: ICD-10-CM

## 2025-05-16 DIAGNOSIS — R05.9 COUGH, UNSPECIFIED TYPE: Primary | ICD-10-CM

## 2025-05-16 RX ORDER — TRAMADOL HYDROCHLORIDE 50 MG/1
50 TABLET ORAL EVERY 6 HOURS PRN
Qty: 30 TABLET | Refills: 0 | Status: SHIPPED | OUTPATIENT
Start: 2025-05-16

## 2025-05-16 RX ORDER — GUAIFENESIN 200 MG/10ML
200 LIQUID ORAL 3 TIMES DAILY PRN
Qty: 120 ML | Refills: 0 | Status: SHIPPED | OUTPATIENT
Start: 2025-05-16

## 2025-05-16 NOTE — PROGRESS NOTES
Pt reports no longer taking oxycodone. Will step down to tramadol 50 mg as ordered. Will continue to taper off narcotic medication.

## 2025-05-19 ENCOUNTER — TELEPHONE (OUTPATIENT)
Age: 70
End: 2025-05-19

## 2025-05-19 NOTE — TELEPHONE ENCOUNTER
[facility] pharmacy following up on Tramadol prescription. ICD 10 codes confirmed. Last visit and future visit dates confirmed.

## 2025-05-20 ENCOUNTER — RESULTS FOLLOW-UP (OUTPATIENT)
Dept: FAMILY MEDICINE CLINIC | Facility: CLINIC | Age: 70
End: 2025-05-20

## 2025-05-20 ENCOUNTER — HOME CARE VISIT (OUTPATIENT)
Dept: HOME HEALTH SERVICES | Facility: HOME HEALTHCARE | Age: 70
End: 2025-05-20
Payer: COMMERCIAL

## 2025-05-20 ENCOUNTER — APPOINTMENT (OUTPATIENT)
Dept: LAB | Facility: CLINIC | Age: 70
End: 2025-05-20
Payer: COMMERCIAL

## 2025-05-20 VITALS — DIASTOLIC BLOOD PRESSURE: 70 MMHG | SYSTOLIC BLOOD PRESSURE: 120 MMHG | HEART RATE: 72 BPM | OXYGEN SATURATION: 94 %

## 2025-05-20 VITALS
SYSTOLIC BLOOD PRESSURE: 120 MMHG | HEART RATE: 72 BPM | OXYGEN SATURATION: 96 % | TEMPERATURE: 98.5 F | BODY MASS INDEX: 28.49 KG/M2 | DIASTOLIC BLOOD PRESSURE: 72 MMHG | RESPIRATION RATE: 19 BRPM | WEIGHT: 187.4 LBS

## 2025-05-20 DIAGNOSIS — I50.33 ACUTE ON CHRONIC DIASTOLIC HEART FAILURE (HCC): ICD-10-CM

## 2025-05-20 LAB
ANION GAP SERPL CALCULATED.3IONS-SCNC: 14 MMOL/L (ref 4–13)
BUN SERPL-MCNC: 18 MG/DL (ref 5–25)
CALCIUM SERPL-MCNC: 9.3 MG/DL (ref 8.4–10.2)
CHLORIDE SERPL-SCNC: 100 MMOL/L (ref 96–108)
CO2 SERPL-SCNC: 27 MMOL/L (ref 21–32)
CREAT SERPL-MCNC: 0.86 MG/DL (ref 0.6–1.3)
GFR SERPL CREATININE-BSD FRML MDRD: 89 ML/MIN/1.73SQ M
GLUCOSE SERPL-MCNC: 103 MG/DL (ref 65–140)
POTASSIUM SERPL-SCNC: 3.8 MMOL/L (ref 3.5–5.3)
SODIUM SERPL-SCNC: 141 MMOL/L (ref 135–147)

## 2025-05-20 PROCEDURE — G0299 HHS/HOSPICE OF RN EA 15 MIN: HCPCS

## 2025-05-20 PROCEDURE — G0151 HHCP-SERV OF PT,EA 15 MIN: HCPCS

## 2025-05-20 PROCEDURE — 80048 BASIC METABOLIC PNL TOTAL CA: CPT

## 2025-05-20 PROCEDURE — 36415 COLL VENOUS BLD VENIPUNCTURE: CPT

## 2025-05-22 ENCOUNTER — HOME CARE VISIT (OUTPATIENT)
Dept: HOME HEALTH SERVICES | Facility: HOME HEALTHCARE | Age: 70
End: 2025-05-22
Payer: COMMERCIAL

## 2025-05-22 VITALS
BODY MASS INDEX: 28.37 KG/M2 | OXYGEN SATURATION: 96 % | SYSTOLIC BLOOD PRESSURE: 112 MMHG | DIASTOLIC BLOOD PRESSURE: 58 MMHG | RESPIRATION RATE: 17 BRPM | WEIGHT: 186.6 LBS | HEART RATE: 83 BPM | TEMPERATURE: 98 F

## 2025-05-22 DIAGNOSIS — M47.816 LUMBAR SPONDYLOSIS: Primary | ICD-10-CM

## 2025-05-22 DIAGNOSIS — G89.4 CHRONIC PAIN SYNDROME: ICD-10-CM

## 2025-05-22 DIAGNOSIS — Z98.1 STATUS POST LUMBAR SPINAL FUSION: ICD-10-CM

## 2025-05-22 DIAGNOSIS — M54.41 ACUTE BILATERAL LOW BACK PAIN WITH BILATERAL SCIATICA: ICD-10-CM

## 2025-05-22 DIAGNOSIS — M54.42 ACUTE BILATERAL LOW BACK PAIN WITH BILATERAL SCIATICA: ICD-10-CM

## 2025-05-22 DIAGNOSIS — M54.16 LUMBAR RADICULOPATHY: ICD-10-CM

## 2025-05-22 PROCEDURE — G0299 HHS/HOSPICE OF RN EA 15 MIN: HCPCS

## 2025-05-23 ENCOUNTER — HOME CARE VISIT (OUTPATIENT)
Dept: HOME HEALTH SERVICES | Facility: HOME HEALTHCARE | Age: 70
End: 2025-05-23
Payer: COMMERCIAL

## 2025-05-23 ENCOUNTER — TELEPHONE (OUTPATIENT)
Age: 70
End: 2025-05-23

## 2025-05-23 PROCEDURE — G0151 HHCP-SERV OF PT,EA 15 MIN: HCPCS

## 2025-05-23 NOTE — TELEPHONE ENCOUNTER
Patient was weighed by visiting nurse yesterday and weight came as 186 lbs  and today at the therapy it is 194 lbs.  Top of his feet are really red. He is on Lasix 20 mg once a day. Patient's wife also sent the same message to his cardiologist Dr. Wisdom ([place]). Please call her to advise. Patient stated that he does not have any other symptoms.  Thank you!!

## 2025-05-24 VITALS — OXYGEN SATURATION: 97 % | HEART RATE: 90 BPM | WEIGHT: 194 LBS | BODY MASS INDEX: 29.5 KG/M2

## 2025-05-27 ENCOUNTER — HOME CARE VISIT (OUTPATIENT)
Dept: HOME HEALTH SERVICES | Facility: HOME HEALTHCARE | Age: 70
End: 2025-05-27
Payer: COMMERCIAL

## 2025-05-27 VITALS
HEART RATE: 81 BPM | WEIGHT: 187.5 LBS | SYSTOLIC BLOOD PRESSURE: 119 MMHG | OXYGEN SATURATION: 95 % | BODY MASS INDEX: 28.51 KG/M2 | DIASTOLIC BLOOD PRESSURE: 66 MMHG

## 2025-05-27 VITALS
SYSTOLIC BLOOD PRESSURE: 105 MMHG | RESPIRATION RATE: 17 BRPM | TEMPERATURE: 97.3 F | HEART RATE: 87 BPM | DIASTOLIC BLOOD PRESSURE: 60 MMHG | BODY MASS INDEX: 28.55 KG/M2 | WEIGHT: 187.8 LBS | OXYGEN SATURATION: 98 %

## 2025-05-27 PROCEDURE — G0157 HHC PT ASSISTANT EA 15: HCPCS

## 2025-05-27 PROCEDURE — G0299 HHS/HOSPICE OF RN EA 15 MIN: HCPCS

## 2025-05-29 ENCOUNTER — HOME CARE VISIT (OUTPATIENT)
Dept: HOME HEALTH SERVICES | Facility: HOME HEALTHCARE | Age: 70
End: 2025-05-29
Payer: COMMERCIAL

## 2025-05-29 ENCOUNTER — APPOINTMENT (OUTPATIENT)
Dept: LAB | Facility: CLINIC | Age: 70
End: 2025-05-29
Attending: INTERNAL MEDICINE
Payer: COMMERCIAL

## 2025-05-29 VITALS
HEART RATE: 87 BPM | DIASTOLIC BLOOD PRESSURE: 69 MMHG | OXYGEN SATURATION: 96 % | RESPIRATION RATE: 16 BRPM | SYSTOLIC BLOOD PRESSURE: 110 MMHG

## 2025-05-29 VITALS
RESPIRATION RATE: 19 BRPM | TEMPERATURE: 98.3 F | DIASTOLIC BLOOD PRESSURE: 58 MMHG | OXYGEN SATURATION: 98 % | SYSTOLIC BLOOD PRESSURE: 110 MMHG | HEART RATE: 87 BPM

## 2025-05-29 DIAGNOSIS — I50.33 ACUTE ON CHRONIC DIASTOLIC HEART FAILURE (HCC): ICD-10-CM

## 2025-05-29 LAB
ANION GAP SERPL CALCULATED.3IONS-SCNC: 11 MMOL/L (ref 4–13)
BNP SERPL-MCNC: 255 PG/ML (ref 0–100)
BUN SERPL-MCNC: 17 MG/DL (ref 5–25)
CALCIUM SERPL-MCNC: 9.5 MG/DL (ref 8.4–10.2)
CHLORIDE SERPL-SCNC: 99 MMOL/L (ref 96–108)
CO2 SERPL-SCNC: 28 MMOL/L (ref 21–32)
CREAT SERPL-MCNC: 1.11 MG/DL (ref 0.6–1.3)
GFR SERPL CREATININE-BSD FRML MDRD: 68 ML/MIN/1.73SQ M
GLUCOSE SERPL-MCNC: 284 MG/DL (ref 65–140)
POTASSIUM SERPL-SCNC: 4.3 MMOL/L (ref 3.5–5.3)
SODIUM SERPL-SCNC: 138 MMOL/L (ref 135–147)

## 2025-05-29 PROCEDURE — 36415 COLL VENOUS BLD VENIPUNCTURE: CPT

## 2025-05-29 PROCEDURE — G0157 HHC PT ASSISTANT EA 15: HCPCS

## 2025-05-29 PROCEDURE — 80048 BASIC METABOLIC PNL TOTAL CA: CPT

## 2025-05-29 PROCEDURE — 83880 ASSAY OF NATRIURETIC PEPTIDE: CPT

## 2025-05-29 PROCEDURE — G0299 HHS/HOSPICE OF RN EA 15 MIN: HCPCS

## 2025-05-30 DIAGNOSIS — M54.16 LUMBAR RADICULOPATHY: ICD-10-CM

## 2025-05-30 DIAGNOSIS — Z98.1 STATUS POST LUMBAR SPINAL FUSION: ICD-10-CM

## 2025-05-30 NOTE — TELEPHONE ENCOUNTER
Reason for call:   [x] Refill   [] Prior Auth  [] Other:     Office:   [x] PCP/Provider -   [] Specialty/Provider -     Medication: traMADol (Ultram) 50 mg tablet     Dose/Frequency: Take 1 tablet (50 mg total) by mouth every 6 (six) hours as needed     Quantity: 30 tablet    Pharmacy: [address] -     Local Pharmacy   Does the patient have enough for 3 days?   [x] Yes   [] No - Send as HP to POD    Mail Away Pharmacy   Does the patient have enough for 10 days?   [] Yes   [] No - Send as HP to POD

## 2025-06-02 ENCOUNTER — HOME CARE VISIT (OUTPATIENT)
Dept: HOME HEALTH SERVICES | Facility: HOME HEALTHCARE | Age: 70
End: 2025-06-02
Payer: COMMERCIAL

## 2025-06-02 VITALS — OXYGEN SATURATION: 98 % | DIASTOLIC BLOOD PRESSURE: 65 MMHG | HEART RATE: 112 BPM | SYSTOLIC BLOOD PRESSURE: 115 MMHG

## 2025-06-02 PROCEDURE — G0157 HHC PT ASSISTANT EA 15: HCPCS

## 2025-06-02 RX ORDER — TRAMADOL HYDROCHLORIDE 50 MG/1
50 TABLET ORAL EVERY 6 HOURS PRN
Qty: 30 TABLET | Refills: 0 | Status: SHIPPED | OUTPATIENT
Start: 2025-06-02 | End: 2025-06-11 | Stop reason: SDUPTHER

## 2025-06-03 ENCOUNTER — HOME CARE VISIT (OUTPATIENT)
Dept: HOME HEALTH SERVICES | Facility: HOME HEALTHCARE | Age: 70
End: 2025-06-03
Payer: COMMERCIAL

## 2025-06-03 VITALS
SYSTOLIC BLOOD PRESSURE: 113 MMHG | TEMPERATURE: 97.8 F | BODY MASS INDEX: 27.92 KG/M2 | OXYGEN SATURATION: 98 % | DIASTOLIC BLOOD PRESSURE: 62 MMHG | WEIGHT: 184.25 LBS | HEART RATE: 74 BPM | HEIGHT: 68 IN | RESPIRATION RATE: 16 BRPM

## 2025-06-03 PROCEDURE — G0299 HHS/HOSPICE OF RN EA 15 MIN: HCPCS

## 2025-06-04 ENCOUNTER — HOME CARE VISIT (OUTPATIENT)
Dept: HOME HEALTH SERVICES | Facility: HOME HEALTHCARE | Age: 70
End: 2025-06-04
Payer: COMMERCIAL

## 2025-06-04 DIAGNOSIS — E11.65 TYPE 2 DIABETES MELLITUS WITH HYPERGLYCEMIA, WITHOUT LONG-TERM CURRENT USE OF INSULIN (HCC): ICD-10-CM

## 2025-06-04 DIAGNOSIS — K20.90 ESOPHAGITIS: ICD-10-CM

## 2025-06-04 DIAGNOSIS — F32.A ANXIETY AND DEPRESSION: Primary | ICD-10-CM

## 2025-06-04 DIAGNOSIS — F41.9 ANXIETY AND DEPRESSION: Primary | ICD-10-CM

## 2025-06-04 PROCEDURE — G0151 HHCP-SERV OF PT,EA 15 MIN: HCPCS

## 2025-06-04 RX ORDER — PANTOPRAZOLE SODIUM 40 MG/1
40 TABLET, DELAYED RELEASE ORAL 2 TIMES DAILY
Qty: 180 TABLET | Refills: 0 | Status: SHIPPED | OUTPATIENT
Start: 2025-06-04

## 2025-06-04 RX ORDER — BUSPIRONE HYDROCHLORIDE 5 MG/1
5 TABLET ORAL 2 TIMES DAILY PRN
Qty: 180 TABLET | Refills: 0 | Status: SHIPPED | OUTPATIENT
Start: 2025-06-04

## 2025-06-04 RX ORDER — BLOOD SUGAR DIAGNOSTIC
STRIP MISCELLANEOUS
Qty: 400 EACH | Refills: 0 | Status: CANCELLED | OUTPATIENT
Start: 2025-06-04

## 2025-06-04 NOTE — TELEPHONE ENCOUNTER
Reason for call:   [x] Refill   [] Prior Auth  [] Other:     Office: [facility]  [x] PCP/Provider - Kamila Reyes   [] Specialty/Provider -     Medication: buspar, pantoprazole     Dose/Frequency: 5 mg, 40 mg/ twice daily PRN, twice daily     Quantity: 90 day supply     Pharmacy: [facility] in [place]     Local Pharmacy   Does the patient have enough for 3 days?   [] Yes   [x] No - Send as HP to POD- out completely.     Mail Away Pharmacy   Does the patient have enough for 10 days?   [] Yes   [] No - Send as HP to POD

## 2025-06-05 VITALS — DIASTOLIC BLOOD PRESSURE: 66 MMHG | SYSTOLIC BLOOD PRESSURE: 114 MMHG | HEART RATE: 77 BPM | OXYGEN SATURATION: 98 %

## 2025-06-06 DIAGNOSIS — M54.42 ACUTE BILATERAL LOW BACK PAIN WITH BILATERAL SCIATICA: Primary | ICD-10-CM

## 2025-06-06 DIAGNOSIS — M54.41 ACUTE BILATERAL LOW BACK PAIN WITH BILATERAL SCIATICA: Primary | ICD-10-CM

## 2025-06-10 ENCOUNTER — HOME CARE VISIT (OUTPATIENT)
Dept: HOME HEALTH SERVICES | Facility: HOME HEALTHCARE | Age: 70
End: 2025-06-10
Payer: COMMERCIAL

## 2025-06-11 DIAGNOSIS — M54.16 LUMBAR RADICULOPATHY: ICD-10-CM

## 2025-06-11 DIAGNOSIS — Z98.1 STATUS POST LUMBAR SPINAL FUSION: ICD-10-CM

## 2025-06-11 RX ORDER — TRAMADOL HYDROCHLORIDE 50 MG/1
50 TABLET ORAL EVERY 6 HOURS PRN
Qty: 30 TABLET | Refills: 0 | Status: SHIPPED | OUTPATIENT
Start: 2025-06-11

## 2025-06-11 NOTE — TELEPHONE ENCOUNTER
Reason for call:   [x] Refill   [] Prior Auth  [] Other:     Office:   [x] PCP/Provider -   [] Specialty/Provider -     traMADol (Ultram) 50 mg tablet 50 mg, Oral, Every 6 hours PRN       Quantity: 30    Pharmacy: [facility]   Does the patient have enough for 3 days?   [] Yes   [x] No - Send as HP to POD    Mail Away Pharmacy   Does the patient have enough for 10 days?   [] Yes   [] No - Send as HP to POD

## 2025-06-12 ENCOUNTER — HOME CARE VISIT (OUTPATIENT)
Dept: HOME HEALTH SERVICES | Facility: HOME HEALTHCARE | Age: 70
End: 2025-06-12
Payer: COMMERCIAL

## 2025-06-12 VITALS
DIASTOLIC BLOOD PRESSURE: 60 MMHG | SYSTOLIC BLOOD PRESSURE: 102 MMHG | TEMPERATURE: 97.5 F | HEART RATE: 78 BPM | OXYGEN SATURATION: 92 % | RESPIRATION RATE: 17 BRPM

## 2025-06-12 PROCEDURE — G0299 HHS/HOSPICE OF RN EA 15 MIN: HCPCS

## 2025-06-17 DIAGNOSIS — F41.9 ANXIETY AND DEPRESSION: ICD-10-CM

## 2025-06-17 DIAGNOSIS — F32.A ANXIETY AND DEPRESSION: ICD-10-CM

## 2025-06-17 RX ORDER — CLONAZEPAM 1 MG/1
1 TABLET ORAL 2 TIMES DAILY
Qty: 60 TABLET | Refills: 0 | Status: SHIPPED | OUTPATIENT
Start: 2025-06-17

## 2025-06-22 ENCOUNTER — APPOINTMENT (EMERGENCY)
Dept: CT IMAGING | Facility: HOSPITAL | Age: 70
End: 2025-06-22
Payer: COMMERCIAL

## 2025-06-22 ENCOUNTER — HOSPITAL ENCOUNTER (EMERGENCY)
Facility: HOSPITAL | Age: 70
Discharge: HOME | End: 2025-06-22
Attending: EMERGENCY MEDICINE | Admitting: EMERGENCY MEDICINE
Payer: COMMERCIAL

## 2025-06-22 VITALS
HEART RATE: 101 BPM | RESPIRATION RATE: 18 BRPM | OXYGEN SATURATION: 95 % | DIASTOLIC BLOOD PRESSURE: 82 MMHG | TEMPERATURE: 98.3 F | SYSTOLIC BLOOD PRESSURE: 173 MMHG

## 2025-06-22 DIAGNOSIS — R11.2 NAUSEA & VOMITING: ICD-10-CM

## 2025-06-22 DIAGNOSIS — R93.7 ABNORMAL CT OF SPINE: ICD-10-CM

## 2025-06-22 DIAGNOSIS — M54.50 LOWER BACK PAIN: Primary | ICD-10-CM

## 2025-06-22 LAB
ALBUMIN SERPL BCG-MCNC: 4.9 G/DL (ref 3.5–5)
ALP SERPL-CCNC: 120 U/L (ref 34–104)
ALT SERPL W P-5'-P-CCNC: 14 U/L (ref 7–52)
ANION GAP SERPL CALCULATED.3IONS-SCNC: 14 MMOL/L (ref 4–13)
ANISOCYTOSIS BLD QL SMEAR: PRESENT
AST SERPL W P-5'-P-CCNC: 19 U/L (ref 13–39)
BACTERIA UR QL AUTO: NORMAL /HPF
BASOPHILS # BLD MANUAL: 0 THOUSAND/UL (ref 0–0.1)
BASOPHILS NFR MAR MANUAL: 0 % (ref 0–1)
BILIRUB SERPL-MCNC: 0.46 MG/DL (ref 0.2–1)
BILIRUB UR QL STRIP: NEGATIVE
BUN SERPL-MCNC: 19 MG/DL (ref 5–25)
CALCIUM SERPL-MCNC: 10.1 MG/DL (ref 8.4–10.2)
CHLORIDE SERPL-SCNC: 99 MMOL/L (ref 96–108)
CLARITY UR: CLEAR
CO2 SERPL-SCNC: 27 MMOL/L (ref 21–32)
COLOR UR: ABNORMAL
CREAT SERPL-MCNC: 0.95 MG/DL (ref 0.6–1.3)
EOSINOPHIL # BLD MANUAL: 0 THOUSAND/UL (ref 0–0.4)
EOSINOPHIL NFR BLD MANUAL: 0 % (ref 0–6)
ERYTHROCYTE [DISTWIDTH] IN BLOOD BY AUTOMATED COUNT: 17.2 % (ref 11.6–15.1)
ERYTHROCYTE [SEDIMENTATION RATE] IN BLOOD: 17 MM/HOUR (ref 0–19)
GFR SERPL CREATININE-BSD FRML MDRD: 82 ML/MIN/1.73SQ M
GLUCOSE SERPL-MCNC: 244 MG/DL (ref 65–140)
GLUCOSE UR STRIP-MCNC: ABNORMAL MG/DL
HCT VFR BLD AUTO: 45 % (ref 36.5–49.3)
HGB BLD-MCNC: 14.1 G/DL (ref 12–17)
HGB UR QL STRIP.AUTO: ABNORMAL
HOLD SPECIMEN: NORMAL
KETONES UR STRIP-MCNC: ABNORMAL MG/DL
LACTATE SERPL-SCNC: 1.1 MMOL/L (ref 0.5–2)
LACTATE SERPL-SCNC: 2.3 MMOL/L (ref 0.5–2)
LEUKOCYTE ESTERASE UR QL STRIP: NEGATIVE
LIPASE SERPL-CCNC: 14 U/L (ref 11–82)
LYMPHOCYTES # BLD AUTO: 0.55 THOUSAND/UL (ref 0.6–4.47)
LYMPHOCYTES # BLD AUTO: 2 % (ref 14–44)
MCH RBC QN AUTO: 26.6 PG (ref 26.8–34.3)
MCHC RBC AUTO-ENTMCNC: 31.3 G/DL (ref 31.4–37.4)
MCV RBC AUTO: 85 FL (ref 82–98)
MONOCYTES # BLD AUTO: 0.27 THOUSAND/UL (ref 0–1.22)
MONOCYTES NFR BLD: 2 % (ref 4–12)
NEUTROPHILS # BLD MANUAL: 12.87 THOUSAND/UL (ref 1.85–7.62)
NEUTS SEG NFR BLD AUTO: 94 % (ref 43–75)
NITRITE UR QL STRIP: NEGATIVE
NON-SQ EPI CELLS URNS QL MICRO: NORMAL /HPF
PH UR STRIP.AUTO: 7.5 [PH]
PLATELET # BLD AUTO: 256 THOUSANDS/UL (ref 149–390)
PLATELET BLD QL SMEAR: ADEQUATE
PMV BLD AUTO: 10.3 FL (ref 8.9–12.7)
POTASSIUM SERPL-SCNC: 3.8 MMOL/L (ref 3.5–5.3)
PROCALCITONIN SERPL-MCNC: 0.1 NG/ML
PROT SERPL-MCNC: 8.1 G/DL (ref 6.4–8.4)
PROT UR STRIP-MCNC: ABNORMAL MG/DL
RBC # BLD AUTO: 5.31 MILLION/UL (ref 3.88–5.62)
RBC #/AREA URNS AUTO: NORMAL /HPF
RBC MORPH BLD: PRESENT
SODIUM SERPL-SCNC: 140 MMOL/L (ref 135–147)
SP GR UR STRIP.AUTO: 1.01 (ref 1–1.03)
UROBILINOGEN UR STRIP-ACNC: <2 MG/DL
VARIANT LYMPHS # BLD AUTO: 2 %
WBC # BLD AUTO: 13.69 THOUSAND/UL (ref 4.31–10.16)
WBC #/AREA URNS AUTO: NORMAL /HPF

## 2025-06-22 PROCEDURE — 96376 TX/PRO/DX INJ SAME DRUG ADON: CPT

## 2025-06-22 PROCEDURE — 74177 CT ABD & PELVIS W/CONTRAST: CPT

## 2025-06-22 PROCEDURE — 96360 HYDRATION IV INFUSION INIT: CPT

## 2025-06-22 PROCEDURE — 99284 EMERGENCY DEPT VISIT MOD MDM: CPT

## 2025-06-22 PROCEDURE — 84145 PROCALCITONIN (PCT): CPT | Performed by: EMERGENCY MEDICINE

## 2025-06-22 PROCEDURE — 81001 URINALYSIS AUTO W/SCOPE: CPT

## 2025-06-22 PROCEDURE — 99285 EMERGENCY DEPT VISIT HI MDM: CPT | Performed by: EMERGENCY MEDICINE

## 2025-06-22 PROCEDURE — 87040 BLOOD CULTURE FOR BACTERIA: CPT | Performed by: EMERGENCY MEDICINE

## 2025-06-22 PROCEDURE — 80053 COMPREHEN METABOLIC PANEL: CPT

## 2025-06-22 PROCEDURE — 85027 COMPLETE CBC AUTOMATED: CPT

## 2025-06-22 PROCEDURE — 96366 THER/PROPH/DIAG IV INF ADDON: CPT

## 2025-06-22 PROCEDURE — 85652 RBC SED RATE AUTOMATED: CPT | Performed by: EMERGENCY MEDICINE

## 2025-06-22 PROCEDURE — 83605 ASSAY OF LACTIC ACID: CPT | Performed by: EMERGENCY MEDICINE

## 2025-06-22 PROCEDURE — 96375 TX/PRO/DX INJ NEW DRUG ADDON: CPT

## 2025-06-22 PROCEDURE — 36415 COLL VENOUS BLD VENIPUNCTURE: CPT | Performed by: EMERGENCY MEDICINE

## 2025-06-22 PROCEDURE — 96365 THER/PROPH/DIAG IV INF INIT: CPT

## 2025-06-22 PROCEDURE — 83690 ASSAY OF LIPASE: CPT

## 2025-06-22 PROCEDURE — 96361 HYDRATE IV INFUSION ADD-ON: CPT

## 2025-06-22 PROCEDURE — 85007 BL SMEAR W/DIFF WBC COUNT: CPT

## 2025-06-22 RX ORDER — ONDANSETRON 4 MG/1
4 TABLET, ORALLY DISINTEGRATING ORAL ONCE
Status: COMPLETED | OUTPATIENT
Start: 2025-06-22 | End: 2025-06-22

## 2025-06-22 RX ORDER — PANTOPRAZOLE SODIUM 40 MG/10ML
40 INJECTION, POWDER, LYOPHILIZED, FOR SOLUTION INTRAVENOUS ONCE
Status: COMPLETED | OUTPATIENT
Start: 2025-06-22 | End: 2025-06-22

## 2025-06-22 RX ORDER — KETOROLAC TROMETHAMINE 30 MG/ML
15 INJECTION, SOLUTION INTRAMUSCULAR; INTRAVENOUS ONCE
Status: COMPLETED | OUTPATIENT
Start: 2025-06-22 | End: 2025-06-22

## 2025-06-22 RX ORDER — OXYCODONE AND ACETAMINOPHEN 5; 325 MG/1; MG/1
1 TABLET ORAL EVERY 8 HOURS PRN
Qty: 6 TABLET | Refills: 0 | Status: SHIPPED | OUTPATIENT
Start: 2025-06-22 | End: 2025-07-02

## 2025-06-22 RX ORDER — ONDANSETRON 2 MG/ML
4 INJECTION INTRAMUSCULAR; INTRAVENOUS ONCE
Status: COMPLETED | OUTPATIENT
Start: 2025-06-22 | End: 2025-06-22

## 2025-06-22 RX ORDER — OXYCODONE AND ACETAMINOPHEN 5; 325 MG/1; MG/1
1 TABLET ORAL ONCE
Refills: 0 | Status: COMPLETED | OUTPATIENT
Start: 2025-06-22 | End: 2025-06-22

## 2025-06-22 RX ORDER — METOPROLOL SUCCINATE 50 MG/1
50 TABLET, EXTENDED RELEASE ORAL ONCE
Status: COMPLETED | OUTPATIENT
Start: 2025-06-22 | End: 2025-06-22

## 2025-06-22 RX ORDER — HYDROMORPHONE HCL/PF 1 MG/ML
0.5 SYRINGE (ML) INJECTION ONCE
Refills: 0 | Status: COMPLETED | OUTPATIENT
Start: 2025-06-22 | End: 2025-06-22

## 2025-06-22 RX ORDER — CLONAZEPAM 0.5 MG/1
1 TABLET ORAL ONCE
Status: COMPLETED | OUTPATIENT
Start: 2025-06-22 | End: 2025-06-22

## 2025-06-22 RX ORDER — ONDANSETRON 4 MG/1
4 TABLET, ORALLY DISINTEGRATING ORAL EVERY 8 HOURS PRN
Qty: 10 TABLET | Refills: 0 | Status: SHIPPED | OUTPATIENT
Start: 2025-06-22 | End: 2025-06-27

## 2025-06-22 RX ADMIN — HYDROMORPHONE HYDROCHLORIDE 0.5 MG: 1 INJECTION, SOLUTION INTRAMUSCULAR; INTRAVENOUS; SUBCUTANEOUS at 19:06

## 2025-06-22 RX ADMIN — VANCOMYCIN HYDROCHLORIDE 2000 MG: 5 INJECTION, POWDER, LYOPHILIZED, FOR SOLUTION INTRAVENOUS at 20:15

## 2025-06-22 RX ADMIN — ONDANSETRON 4 MG: 4 TABLET, ORALLY DISINTEGRATING ORAL at 22:51

## 2025-06-22 RX ADMIN — ONDANSETRON 4 MG: 2 INJECTION INTRAMUSCULAR; INTRAVENOUS at 19:08

## 2025-06-22 RX ADMIN — METOPROLOL SUCCINATE 50 MG: 50 TABLET, EXTENDED RELEASE ORAL at 20:57

## 2025-06-22 RX ADMIN — KETOROLAC TROMETHAMINE 15 MG: 30 INJECTION, SOLUTION INTRAMUSCULAR; INTRAVENOUS at 18:10

## 2025-06-22 RX ADMIN — HYDROMORPHONE HYDROCHLORIDE 0.5 MG: 1 INJECTION, SOLUTION INTRAMUSCULAR; INTRAVENOUS; SUBCUTANEOUS at 18:08

## 2025-06-22 RX ADMIN — OXYCODONE HYDROCHLORIDE AND ACETAMINOPHEN 1 TABLET: 5; 325 TABLET ORAL at 22:51

## 2025-06-22 RX ADMIN — SODIUM CHLORIDE 500 ML: 0.9 INJECTION, SOLUTION INTRAVENOUS at 20:18

## 2025-06-22 RX ADMIN — PANTOPRAZOLE SODIUM 40 MG: 40 INJECTION, POWDER, FOR SOLUTION INTRAVENOUS at 19:02

## 2025-06-22 RX ADMIN — CLONAZEPAM 1 MG: 0.5 TABLET ORAL at 20:57

## 2025-06-22 RX ADMIN — ONDANSETRON 4 MG: 2 INJECTION INTRAMUSCULAR; INTRAVENOUS at 18:12

## 2025-06-22 RX ADMIN — IOHEXOL 100 ML: 350 INJECTION, SOLUTION INTRAVENOUS at 18:26

## 2025-06-22 NOTE — ED PROVIDER NOTES
Time reflects when diagnosis was documented in both MDM as applicable and the Disposition within this note       Time User Action Codes Description Comment    6/22/2025  7:53 PM Osmar Roque Add [M86.9] Osteomyelitis (HCC)     6/22/2025  7:53 PM Osmar Roque Modify [M86.9] Osteomyelitis (HCC) discitis spine acute exacerbation    6/22/2025 10:35 PM Kassie Pham Remove [M86.9] Osteomyelitis (HCC) discitis spine acute exacerbation    6/22/2025 10:35 PM Kassie Pham Add [M54.50] Lower back pain     6/22/2025 10:35 PM Kassie Pham Add [R11.2] Nausea & vomiting     6/22/2025 10:36 PM Kassie Pham Add [R93.7] Abnormal CT of spine           ED Disposition       ED Disposition   Discharge    Condition   Stable    Date/Time   Sun Jun 22, 2025 10:35 PM    Comment   [facility] discharge to home/self care.                   Assessment & Plan       Medical Decision Making  Diff includes exacerbation chronic back pain, at risk for intra-abdominal infection, back infection/ UTI, kidney stone, sciatica      Patient feeling better in ER after meds - Dr. Nas MITCHELL at [facility] recommended home with follow-up 9 am with Dr. Call in the morning as scheduled.      Amount and/or Complexity of Data Reviewed  Labs: ordered.  Radiology: ordered.    Risk  Prescription drug management.        ED Course as of 06/24/25 1250   Sun Jun 22, 2025   1957 Feels better BP improved.  Dose have SIRS with some inflammatory  elevations on cbc - giving IV abx, lactic acid pending.     2022 S/o to [facility] - severe left low back pain vomiting today, hx of osteomyelitis hardware revision lumbar spine 3-25 [facility]  - SIRS, with elevated lactic acid and abs neutrophils.  Ct with osteomyelitis, discitis similar to prior.  Giving iv abx and fluids, sepsis alert called.  Waiting for call back from [facility] covering for Dr. Call for transfer       Medications   ketorolac (TORADOL) injection 15 mg (15 mg Intravenous Given 6/22/25 1810)   ondansetron  (ZOFRAN) injection 4 mg (4 mg Intravenous Given 6/22/25 1812)   HYDROmorphone (DILAUDID) injection 0.5 mg (0.5 mg Intravenous Given 6/22/25 1808)   iohexol (OMNIPAQUE) 350 MG/ML injection (MULTI-DOSE) 100 mL (100 mL Intravenous Given 6/22/25 1826)   HYDROmorphone (DILAUDID) injection 0.5 mg (0.5 mg Intravenous Given 6/22/25 1906)   ondansetron (ZOFRAN) injection 4 mg (4 mg Intravenous Given 6/22/25 1908)   pantoprazole (PROTONIX) injection 40 mg (40 mg Intravenous Given 6/22/25 1902)   vancomycin (VANCOCIN) 2000 mg in sodium chloride 0.9% 500 mL IVPB (0 mg Intravenous Stopped 6/22/25 2238)   sodium chloride 0.9 % bolus 500 mL (0 mL Intravenous Stopped 6/22/25 2149)   clonazePAM (KlonoPIN) tablet 1 mg (1 mg Oral Given 6/22/25 2057)   metoprolol succinate (TOPROL-XL) 24 hr tablet 50 mg (50 mg Oral Given 6/22/25 2057)   oxyCODONE-acetaminophen (PERCOCET) 5-325 mg per tablet 1 tablet (1 tablet Oral Given 6/22/25 2251)   ondansetron (ZOFRAN-ODT) dispersible tablet 4 mg (4 mg Oral Given 6/22/25 2251)       ED Risk Strat Scores                    No data recorded        SBIRT 22yo+      Flowsheet Row Most Recent Value   Initial Alcohol Screen: US AUDIT-C     1. How often do you have a drink containing alcohol? 0 Filed at: 06/22/2025 1703   2. How many drinks containing alcohol do you have on a typical day you are drinking?  0 Filed at: 06/22/2025 1703   3a. Male UNDER 65: How often do you have five or more drinks on one occasion? 0 Filed at: 06/22/2025 1703   3b. FEMALE Any Age, or MALE 65+: How often do you have 4 or more drinks on one occassion? 0 Filed at: 06/22/2025 1703   Audit-C Score 0 Filed at: 06/22/2025 1703   JARON: How many times in the past year have you...    Used an illegal drug or used a prescription medication for non-medical reasons? Never Filed at: 06/22/2025 5289                            History of Present Illness       Chief Complaint   Patient presents with    Abdominal Pain     Pt states he has an  upset stomach and lower left back pain. Patient had back surgery 2 months ago. Patient took prontonix, zofran and reglan PTA       Past Medical History[1]   Past Surgical History[2]   Family History[3]   Social History[4]   E-Cigarette/Vaping    E-Cigarette Use Former User     Comments medical marijuana - occ       E-Cigarette/Vaping Substances    Nicotine No     THC Yes     CBD Yes     Flavoring No     Other No     Unknown No       I have reviewed and agree with the history as documented.     Hx from patient and wife, med records [facility] chronic back pain, hx of extensive back surgery at [facility]  s/p T12-S1 fusion (Dr. Moraes 4/11/2023), s/p L L5-S1 disc/abscess washout 6/14/24 L5-S1 ALIF, E54-ildful fusion Dr. Call 3-21-25 anterior and posterior approach with removal and insertion new hardware, also hx of CHF, DM, HTN, anxiety.    Patient developed more severe pain left lower back, left abdomen with nausea, vomiting today.  No new injuries. Tried reglan tramadol, protonic zofran, without relief.  Denies fever, CP, SOB.  Wife concerned with infection with elevated serum WBC.  No radiation of pain, no new numbness or weakness.  No saddle anesthesia or loss of bowel/ bladder control.        Review of Systems   Constitutional:  Negative for chills and fever.   HENT:  Negative for rhinorrhea and sore throat.    Respiratory:  Negative for shortness of breath.    Cardiovascular:  Negative for chest pain.   Gastrointestinal:  Positive for abdominal pain, nausea and vomiting. Negative for constipation and diarrhea.   Genitourinary:  Negative for dysuria and frequency.   Musculoskeletal:  Positive for back pain.   Skin:  Negative for rash.   Neurological:  Negative for weakness and numbness.   All other systems reviewed and are negative.          Objective       ED Triage Vitals [06/22/25 1703]   Temperature Pulse Blood Pressure Respirations SpO2 Patient Position - Orthostatic VS   98.3 °F (36.8 °C) 103 (!) 191/93 18 100 % Sitting       Temp Source Heart Rate Source BP Location FiO2 (%) Pain Score    Temporal Monitor Left arm -- 10 - Worst Possible Pain      Vitals      Date and Time Temp Pulse SpO2 Resp BP Pain Score FACES Pain Rating User   06/22/25 2251 -- -- -- -- -- 10 - Worst Possible Pain -- CK   06/22/25 2100 -- 101 95 % 18 173/82 -- -- CK   06/22/25 2000 -- 99 98 % 18 176/92 -- -- CK   06/22/25 1906 -- -- -- -- -- 10 - Worst Possible Pain -- CK   06/22/25 1900 -- 112 97 % 18 210/107 -- -- CK   06/22/25 1808 -- -- -- -- -- 10 - Worst Possible Pain -- CK   06/22/25 1745 -- -- -- 18 207/93 -- -- CK   06/22/25 1703 98.3 °F (36.8 °C) 103 100 % 18 191/93 10 - Worst Possible Pain -- AM            Physical Exam  Vitals and nursing note reviewed.   Constitutional:       Appearance: He is well-developed.   HENT:      Head: Normocephalic and atraumatic.      Right Ear: External ear normal.      Left Ear: External ear normal.      Nose: Nose normal.     Eyes:      Conjunctiva/sclera: Conjunctivae normal.      Pupils: Pupils are equal, round, and reactive to light.       Cardiovascular:      Rate and Rhythm: Normal rate and regular rhythm.      Heart sounds: Normal heart sounds.   Pulmonary:      Effort: Pulmonary effort is normal. No respiratory distress.      Breath sounds: Normal breath sounds. No wheezing.   Abdominal:      General: Bowel sounds are normal. There is no distension.      Palpations: Abdomen is soft.      Tenderness: There is no abdominal tenderness.     Musculoskeletal:         General: No deformity. Normal range of motion.      Cervical back: Normal range of motion and neck supple. No bony tenderness. No spinous process tenderness.      Thoracic back: No bony tenderness.      Lumbar back: No bony tenderness. Negative right straight leg raise test and negative left straight leg raise test.        Back:      Skin:     General: Skin is warm and dry.      Findings: No rash.     Neurological:      General: No focal deficit present.       Mental Status: He is alert.      GCS: GCS eye subscore is 4. GCS verbal subscore is 5. GCS motor subscore is 6.      Sensory: No sensory deficit.      Motor: No weakness.     Psychiatric:         Mood and Affect: Mood normal.         Results Reviewed       Procedure Component Value Units Date/Time    Blood culture #1 [[id number]] Collected: 06/22/25 1951    Lab Status: Preliminary result Specimen: Blood from Arm, Right Updated: 06/24/25 0701     Blood Culture No Growth at 24 hrs.    Blood culture #2 [[id number]] Collected: 06/22/25 1951    Lab Status: Preliminary result Specimen: Blood from Arm, Left Updated: 06/24/25 0701     Blood Culture No Growth at 24 hrs.    Lactic acid 2 Hours [[id number]]  (Normal) Collected: 06/22/25 2154    Lab Status: Final result Specimen: Blood from Arm, Right Updated: 06/22/25 2214     LACTIC ACID 1.1 mmol/L     Narrative:      Result may be elevated if tourniquet was used during collection.    Sedimentation rate, automated [[id number]]  (Normal) Collected: 06/22/25 1706    Lab Status: Final result Specimen: Blood from Arm, Left Updated: 06/22/25 2022     Sed Rate 17 mm/hour     Lactic acid, plasma (w/reflex if result > 2.0) [[id number]]  (Abnormal) Collected: 06/22/25 1951    Lab Status: Final result Specimen: Blood from Arm, Left Updated: 06/22/25 2014     LACTIC ACID 2.3 mmol/L     Narrative:      Result may be elevated if tourniquet was used during collection.    Procalcitonin [[id number]]  (Normal) Collected: 06/22/25 1706    Lab Status: Final result Specimen: Blood from Arm, Left Updated: 06/22/25 1941     Procalcitonin 0.10 ng/ml     Urine Microscopic [[id number]]  (Normal) Collected: 06/22/25 1826    Lab Status: Final result Specimen: Urine, Other Updated: 06/22/25 1925     RBC, UA 1-2 /hpf      WBC, UA 0-1 /hpf      Epithelial Cells Occasional /hpf      Bacteria, UA None Seen /hpf     [place] draw [[id number]] Collected: 06/22/25 1706    Lab Status: Final result Specimen: Blood  from Arm, Left Updated: 06/22/25 1902    Narrative:      The following orders were created for panel order [place] draw.  Procedure                               Abnormality         Status                     ---------                               -----------         ------                     Light Blue Top on hold[[id number]]                           Final result               Gold top on hold[[id number]]                                 Final result               Green / Black tube on hold[[id number]]                       Final result                 Please view results for these tests on the individual orders.    UA w Reflex to Microscopic w Reflex to Culture [[id number]]  (Abnormal) Collected: 06/22/25 1826    Lab Status: Final result Specimen: Urine, Other Updated: 06/22/25 1845     Color, UA Light Yellow     Clarity, UA Clear     Specific Gravity, UA 1.015     pH, UA 7.5     Leukocytes, UA Negative     Nitrite, UA Negative     Protein,  (2+) mg/dl      Glucose, UA 1000 (1%) mg/dl      Ketones, UA 10 (1+) mg/dl      Urobilinogen, UA <2.0 mg/dl      Bilirubin, UA Negative     Occult Blood, UA Trace    Manual Differential(PHLEBS Do Not Order) [[id number]]  (Abnormal) Collected: 06/22/25 1706    Lab Status: Final result Specimen: Blood from Arm, Left Updated: 06/22/25 1837     Segmented % 94 %      Lymphocytes % 2 %      Monocytes % 2 %      Eosinophils % 0 %      Basophils % 0 %      Atypical Lymphocytes % 2 %      Absolute Neutrophils 12.87 Thousand/uL      Absolute Lymphocytes 0.55 Thousand/uL      Absolute Monocytes 0.27 Thousand/uL      Absolute Eosinophils 0.00 Thousand/uL      Absolute Basophils 0.00 Thousand/uL      Total Counted --     RBC Morphology Present     Platelet Estimate Adequate     Anisocytosis Present    Comprehensive metabolic panel [[id number]]  (Abnormal) Collected: 06/22/25 1706    Lab Status: Final result Specimen: Blood from Arm, Left Updated: 06/22/25 1729     Sodium 140 mmol/L       Potassium 3.8 mmol/L      Chloride 99 mmol/L      CO2 27 mmol/L      ANION GAP 14 mmol/L      BUN 19 mg/dL      Creatinine 0.95 mg/dL      Glucose 244 mg/dL      Calcium 10.1 mg/dL      AST 19 U/L      ALT 14 U/L      Alkaline Phosphatase 120 U/L      Total Protein 8.1 g/dL      Albumin 4.9 g/dL      Total Bilirubin 0.46 mg/dL      eGFR 82 ml/min/1.73sq m     Narrative:      National Kidney Disease Foundation guidelines for Chronic Kidney Disease (CKD):     Stage 1 with normal or high GFR (GFR > 90 mL/min/1.73 square meters)    Stage 2 Mild CKD (GFR = 60-89 mL/min/1.73 square meters)    Stage 3A Moderate CKD (GFR = 45-59 mL/min/1.73 square meters)    Stage 3B Moderate CKD (GFR = 30-44 mL/min/1.73 square meters)    Stage 4 Severe CKD (GFR = 15-29 mL/min/1.73 square meters)    Stage 5 End Stage CKD (GFR <15 mL/min/1.73 square meters)  Note: GFR calculation is accurate only with a steady state creatinine    Lipase [[id number]]  (Normal) Collected: 06/22/25 1706    Lab Status: Final result Specimen: Blood from Arm, Left Updated: 06/22/25 1729     Lipase 14 u/L     CBC and differential [[id number]]  (Abnormal) Collected: 06/22/25 1706    Lab Status: Final result Specimen: Blood from Arm, Left Updated: 06/22/25 1715     WBC 13.69 Thousand/uL      RBC 5.31 Million/uL      Hemoglobin 14.1 g/dL      Hematocrit 45.0 %      MCV 85 fL      MCH 26.6 pg      MCHC 31.3 g/dL      RDW 17.2 %      MPV 10.3 fL      Platelets 256 Thousands/uL             CT abdomen pelvis with contrast   Final Interpretation by Devin Taylor MD (06/22 1936)      No acute inflammatory process identified in the abdomen or pelvis.      Stable gallstones without surrounding inflammation.      Stable prostatomegaly.      Interval placement of a prosthetic disc component at L5-S1 with improved retrolisthesis. Posterior fusion hardware noted from T10-L5 appears intact. Multilevel discitis/osteomyelitis changes appear grossly similar.         Workstation  performed: [id number]             Procedures    ED Medication and Procedure Management   Prior to Admission Medications   Prescriptions Last Dose Informant Patient Reported? Taking?   ACCU-CHEK FASTCLIX LANCETS MISC  Spouse/Significant Other, Self Yes No   Si (four) times a day   Patient not taking: Reported on 2025   Blood Glucose Monitoring Suppl (OneTouch Verio Reflect) w/Device KIT   No No   Sig: Check blood sugars four times daily. Please substitute with appropriate alternative as covered by patient's insurance. Dx: E11.65   Patient not taking: Reported on 2025   CANNABIDIOL PO  Spouse/Significant Other, Self Yes No   Jardiance 10 MG TABS tablet   No No   Sig: Take 1 tablet (10 mg total) by mouth every morning   Lactobacillus Rhamnosus, GG, (Culturelle) CAPS  Spouse/Significant Other, Self Yes No   Sig: Take 1 capsule by mouth in the morning.   NIFEdipine (ADALAT CC) 90 mg 24 hr tablet  Spouse/Significant Other, Self Yes No   Sig: Take 90 mg by mouth in the morning.   NIFEdipine (PROCARDIA XL) 30 mg 24 hr tablet  Self, Spouse/Significant Other No No   Sig: Take 1 tablet (30 mg total) by mouth daily With nifedipine 90 mg to total 120 mg daily   OneTouch Delica Lancets 33G MISC   No No   Sig: Check blood sugars four times daily. Please substitute with appropriate alternative as covered by patient's insurance. Dx: E11.65   Patient not taking: Reported on 2025   acetaminophen (TYLENOL) 500 mg tablet  Spouse/Significant Other, Self No No   Sig: Take 2 tablets (1,000 mg total) by mouth every 8 (eight) hours 500 mg tablet   busPIRone (BUSPAR) 5 mg tablet   No No   Sig: Take 1 tablet (5 mg total) by mouth as needed in the morning and 1 tablet (5 mg total) as needed in the evening (Anxiety).   clonazePAM (KlonoPIN) 1 mg tablet   No No   Sig: Take 1 tablet (1 mg total) by mouth 2 (two) times a day & 3rd pill PRN for anxiety   cyclobenzaprine (FLEXERIL) 5 mg tablet  Self, Spouse/Significant Other No No    Sig: Take 1 tablet (5 mg total) by mouth 3 (three) times a day as needed for muscle spasms   doxycycline hyclate (VIBRAMYCIN) 100 mg capsule  Spouse/Significant Other, Self Yes No   Sig: Take 100 mg by mouth in the morning and 100 mg in the evening.   enoxaparin (LOVENOX) 40 mg/0.4 mL   Yes No   Sig: Inject 40 mg under the skin daily   Patient not taking: Reported on 5/9/2025   escitalopram (Lexapro) 20 mg tablet  Spouse/Significant Other, Self Yes No   Sig: Take 20 mg by mouth in the morning.   ferrous sulfate 325 (65 Fe) mg tablet   Yes No   Sig: Take 1 tablet by mouth every other day   finasteride (PROSCAR) 5 mg tablet  Self, Spouse/Significant Other No No   Sig: Take 1 tablet (5 mg total) by mouth daily   folic acid (FOLVITE) 1 mg tablet  Spouse/Significant Other, Self Yes No   Sig: Take 2,000 mcg by mouth in the morning.   furosemide (LASIX) 20 mg tablet   No No   Sig: Take 1 tablet (20 mg total) by mouth daily take 1 tab by mouth daily as needed for weight gain more than 3lbs overnight or more than 5lb in a week, or LE swelling.   gabapentin (NEURONTIN) 400 mg capsule  Self, Spouse/Significant Other No No   Sig: Take 1 capsule by mouth twice daily   glucose blood (OneTouch Verio) test strip   No No   Sig: Check blood sugars four times daily. Please substitute with appropriate alternative as covered by patient's insurance. Dx: E11.65   Patient not taking: Reported on 5/9/2025   guaiFENesin (ROBITUSSIN) 100 MG/5ML oral liquid   No No   Sig: Take 10 mL (200 mg total) by mouth 3 (three) times a day as needed for cough   hydrOXYzine pamoate (VISTARIL) 50 mg capsule  Self, Spouse/Significant Other No No   Sig: Take 1 capsule (50 mg total) by mouth 2 (two) times a day   ketoconazole (NIZORAL) 2 % shampoo  Self, Spouse/Significant Other No No   Sig: Apply 1 Application topically 2 (two) times a week Use as directed   lidocaine (LIDODERM) 5 %  Spouse/Significant Other, Self No No   Sig: Apply 1 patch topically over  12 hours daily Remove & Discard patch within 12 hours or as directed by MD   Patient not taking: Reported on 2025   losartan-hydrochlorothiazide (HYZAAR) 100-25 MG per tablet   No No   Sig: Take 1 tablet by mouth in the morning   lovastatin (MEVACOR) 20 mg tablet  Self, Spouse/Significant Other No No   Sig: Take 1 tablet (20 mg total) by mouth every morning   metFORMIN (GLUCOPHAGE) 1000 MG tablet   No No   Sig: Take 1 tablet (1,000 mg total) by mouth daily with breakfast   metoclopramide (REGLAN) 10 mg tablet  Spouse/Significant Other, Self Yes No   Sig: Take 10 mg by mouth in the morning and 10 mg in the evening. Take before meals.   metoprolol succinate (TOPROL-XL) 50 mg 24 hr tablet  Spouse/Significant Other, Self Yes No   Sig: Take 50 mg by mouth in the morning and 50 mg before bedtime.   mirtazapine (REMERON) 45 MG tablet  Self, Spouse/Significant Other No No   Sig: TAKE 1 TABLET BY MOUTH ONCE DAILY AT BEDTIME   naloxone (NARCAN) 4 mg/0.1 mL nasal spray   No No   Si.1 mL (4 mg total) into each nostril every 3 (three) minutes as needed for opioid reversal or respiratory depression   Patient not taking: Reported on 2025   ondansetron (Zofran ODT) 4 mg disintegrating tablet  Spouse/Significant Other, Self No No   Sig: Take 1 tablet (4 mg total) by mouth every 6 (six) hours as needed for nausea or vomiting   pantoprazole (PROTONIX) 40 mg tablet   No No   Sig: Take 1 tablet (40 mg total) by mouth in the morning and 1 tablet (40 mg total) before bedtime.   polyethylene glycol (MIRALAX) 17 g packet  Spouse/Significant Other, Self Yes No   Sig: Take 17 g by mouth   Patient not taking: Reported on 2025   senna (SENOKOT) 8.6 mg  Spouse/Significant Other, Self No No   Sig: Take 1 tablet (8.6 mg total) by mouth 2 (two) times a day 2 tabs at bedtime as needed for constipation per latest dci   tamsulosin (FLOMAX) 0.4 mg  Spouse/Significant Other, Self No No   Sig: TAKE 1 CAPSULE BY MOUTH DAILY WITH DINNER    traMADol (Ultram) 50 mg tablet   No No   Sig: Take 1 tablet (50 mg total) by mouth every 6 (six) hours as needed for moderate pain   triamcinolone (KENALOG) 0.1 % ointment  Self, Spouse/Significant Other No No   Sig: Apply topically 2 (two) times a day   Patient not taking: Reported on 5/9/2025      Facility-Administered Medications: None     Discharge Medication List as of 6/22/2025 10:39 PM        START taking these medications    Details   !! ondansetron (ZOFRAN-ODT) 4 mg disintegrating tablet Take 1 tablet (4 mg total) by mouth every 8 (eight) hours as needed for nausea or vomiting for up to 5 days, Starting Sun 6/22/2025, Until Fri 6/27/2025 at 2359, Normal      oxyCODONE-acetaminophen (Percocet) 5-325 mg per tablet Take 1 tablet by mouth every 8 (eight) hours as needed for moderate pain for up to 10 days Max Daily Amount: 3 tablets, Starting Sun 6/22/2025, Until Wed 7/2/2025 at 2359, Normal       !! - Potential duplicate medications found. Please discuss with provider.        CONTINUE these medications which have NOT CHANGED    Details   !! ACCU-CHEK FASTCLIX LANCETS MISC 4 (four) times a day, Starting Sat 12/22/2018, Historical Med      acetaminophen (TYLENOL) 500 mg tablet Take 2 tablets (1,000 mg total) by mouth every 8 (eight) hours 500 mg tablet, Starting Tue 5/21/2024, No Print      Blood Glucose Monitoring Suppl (OneTouch Verio Reflect) w/Device KIT Check blood sugars four times daily. Please substitute with appropriate alternative as covered by patient's insurance. Dx: E11.65, Normal      busPIRone (BUSPAR) 5 mg tablet Take 1 tablet (5 mg total) by mouth as needed in the morning and 1 tablet (5 mg total) as needed in the evening (Anxiety)., Starting Wed 6/4/2025, Normal      CANNABIDIOL PO Historical Med      clonazePAM (KlonoPIN) 1 mg tablet Take 1 tablet (1 mg total) by mouth 2 (two) times a day & 3rd pill PRN for anxiety, Starting Tue 6/17/2025, Normal      cyclobenzaprine (FLEXERIL) 5 mg tablet  Take 1 tablet (5 mg total) by mouth 3 (three) times a day as needed for muscle spasms, Starting Mon 1/20/2025, Normal      doxycycline hyclate (VIBRAMYCIN) 100 mg capsule Take 100 mg by mouth in the morning and 100 mg in the evening., Starting Sat 8/10/2024, Historical Med      enoxaparin (LOVENOX) 40 mg/0.4 mL Inject 40 mg under the skin daily, Starting Wed 3/26/2025, Historical Med      escitalopram (Lexapro) 20 mg tablet Take 20 mg by mouth in the morning., Starting Wed 5/29/2024, Historical Med      ferrous sulfate 325 (65 Fe) mg tablet Take 1 tablet by mouth every other day, Starting Wed 3/26/2025, Historical Med      finasteride (PROSCAR) 5 mg tablet Take 1 tablet (5 mg total) by mouth daily, Starting Tue 2/4/2025, Normal      folic acid (FOLVITE) 1 mg tablet Take 2,000 mcg by mouth in the morning., Starting Mon 12/17/2018, Historical Med      furosemide (LASIX) 20 mg tablet Take 1 tablet (20 mg total) by mouth daily take 1 tab by mouth daily as needed for weight gain more than 3lbs overnight or more than 5lb in a week, or LE swelling., Starting Sun 5/11/2025, Normal      gabapentin (NEURONTIN) 400 mg capsule Take 1 capsule by mouth twice daily, Starting Tue 2/11/2025, Normal      glucose blood (OneTouch Verio) test strip Check blood sugars four times daily. Please substitute with appropriate alternative as covered by patient's insurance. Dx: E11.65, Normal      guaiFENesin (ROBITUSSIN) 100 MG/5ML oral liquid Take 10 mL (200 mg total) by mouth 3 (three) times a day as needed for cough, Starting Fri 5/16/2025, Normal      hydrOXYzine pamoate (VISTARIL) 50 mg capsule Take 1 capsule (50 mg total) by mouth 2 (two) times a day, Starting Mon 2/10/2025, Normal      Jardiance 10 MG TABS tablet Take 1 tablet (10 mg total) by mouth every morning, Starting Wed 4/30/2025, No Print      ketoconazole (NIZORAL) 2 % shampoo Apply 1 Application topically 2 (two) times a week Use as directed, Starting Thu 1/16/2025, Normal       Lactobacillus Rhamnosus, GG, (Culturelle) CAPS Take 1 capsule by mouth in the morning., Starting Tue 7/30/2024, Historical Med      lidocaine (LIDODERM) 5 % Apply 1 patch topically over 12 hours daily Remove & Discard patch within 12 hours or as directed by MD, Starting Tue 2/27/2024, Normal      losartan-hydrochlorothiazide (HYZAAR) 100-25 MG per tablet Take 1 tablet by mouth in the morning, Starting Wed 4/30/2025, Normal      lovastatin (MEVACOR) 20 mg tablet Take 1 tablet (20 mg total) by mouth every morning, Starting Thu 1/2/2025, Normal      metFORMIN (GLUCOPHAGE) 1000 MG tablet Take 1 tablet (1,000 mg total) by mouth daily with breakfast, Starting Wed 4/30/2025, Normal      metoclopramide (REGLAN) 10 mg tablet Take 10 mg by mouth in the morning and 10 mg in the evening. Take before meals., Starting Sat 11/16/2024, Historical Med      metoprolol succinate (TOPROL-XL) 50 mg 24 hr tablet Take 50 mg by mouth in the morning and 50 mg before bedtime., Starting Wed 4/3/2024, Historical Med      mirtazapine (REMERON) 45 MG tablet TAKE 1 TABLET BY MOUTH ONCE DAILY AT BEDTIME, Starting Thu 2/27/2025, Normal      naloxone (NARCAN) 4 mg/0.1 mL nasal spray 0.1 mL (4 mg total) into each nostril every 3 (three) minutes as needed for opioid reversal or respiratory depression, Starting Tue 4/29/2025, Normal      !! NIFEdipine (ADALAT CC) 90 mg 24 hr tablet Take 90 mg by mouth in the morning., Starting Wed 11/20/2024, Historical Med      !! NIFEdipine (PROCARDIA XL) 30 mg 24 hr tablet Take 1 tablet (30 mg total) by mouth daily With nifedipine 90 mg to total 120 mg daily, Starting Wed 1/22/2025, Until Mon 7/21/2025, Normal      !! ondansetron (Zofran ODT) 4 mg disintegrating tablet Take 1 tablet (4 mg total) by mouth every 6 (six) hours as needed for nausea or vomiting, Starting Tue 5/21/2024, Normal      !! OneTouch Delica Lancets 33G MISC Check blood sugars four times daily. Please substitute with appropriate alternative as  covered by patient's insurance. Dx: E11.65, Normal      pantoprazole (PROTONIX) 40 mg tablet Take 1 tablet (40 mg total) by mouth in the morning and 1 tablet (40 mg total) before bedtime., Starting Wed 6/4/2025, Normal      polyethylene glycol (MIRALAX) 17 g packet Take 17 g by mouth, Starting Tue 6/18/2024, Historical Med      senna (SENOKOT) 8.6 mg Take 1 tablet (8.6 mg total) by mouth 2 (two) times a day 2 tabs at bedtime as needed for constipation per latest dci, Starting Tue 5/21/2024, No Print      tamsulosin (FLOMAX) 0.4 mg TAKE 1 CAPSULE BY MOUTH DAILY WITH DINNER, Starting Tue 8/13/2024, Normal      traMADol (Ultram) 50 mg tablet Take 1 tablet (50 mg total) by mouth every 6 (six) hours as needed for moderate pain, Starting Wed 6/11/2025, Normal      triamcinolone (KENALOG) 0.1 % ointment Apply topically 2 (two) times a day, Starting Tue 12/17/2024, Normal       !! - Potential duplicate medications found. Please discuss with provider.        No discharge procedures on file.  ED SEPSIS DOCUMENTATION   Time reflects when diagnosis was documented in both MDM as applicable and the Disposition within this note       Time User Action Codes Description Comment    6/22/2025  7:53 PM Osmar Roque Add [M86.9] Osteomyelitis (HCC)     6/22/2025  7:53 PM Osmar Roque Modify [M86.9] Osteomyelitis (HCC) discitis spine acute exacerbation    6/22/2025 10:35 PM Kassie Pham Remove [M86.9] Osteomyelitis (HCC) discitis spine acute exacerbation    6/22/2025 10:35 PM Kassie Pham Add [M54.50] Lower back pain     6/22/2025 10:35 PM Kassie Pham Add [R11.2] Nausea & vomiting     6/22/2025 10:36 PM Kassie Pham Add [R93.7] Abnormal CT of spine            Initial Sepsis Screening       Row Name 06/22/25 2020                Is the patient's history suggestive of a new or worsening infection? Yes (Proceed)  -MANISH        Suspected source of infection infected joint  -MANISH        Indicate SIRS criteria Tachycardia > 90  "bpm;Leukocytosis (WBC > 18829 IJL) OR Leukopenia (WBC <4000 IJL) OR Bandemia (WBC >10% bands)  -MANISH        Are two or more of the above signs & symptoms of infection both present and new to the patient? Yes (Proceed)  -MANISH        Assess for evidence of organ dysfunction: Are any of the below criteria present within 6 hours of suspected infection and SIRS criteria that are NOT considered to be chronic conditions? Lactate > 2.0  -MANISH        Date of presentation of severe sepsis 06/22/25  -MANISH        Time of presentation of severe sepsis 2020  -MANISH        Sepsis Note: Click \"NEXT\" below (NOT \"close\") to generate sepsis note based on above information. YES (proceed by clicking \"NEXT\")  -MANISH                  User Key  (r) = Recorded By, (t) = Taken By, (c) = Cosigned By      Initials Name Provider Type    MANISH Roque DO Physician                  Default Flowsheet Data (Last 720 Hours)       Sepsis Reassess       Row Name 06/22/25 2030                   Repeat Volume Status and Tissue Perfusion Assessment Performed    Date of Reassessment: 06/22/25  -MANISH        Time of Reassessment: 2031 -MANISH        Sepsis Reassessment Note: Click \"NEXT\" below (NOT \"close\") to generate sepsis reassessment note. YES (proceed by clicking \"NEXT\")  -MANISH        Repeat Volume Status and Tissue Perfusion Assessment Performed --                  User Key  (r) = Recorded By, (t) = Taken By, (c) = Cosigned By      Initials Name Provider Type    MANISH Roque DO Physician                         [1]   Past Medical History:  Diagnosis Date    Allergic     Anemia     Anxiety     Arthritis     Cancer (HCC)     CHF (congestive heart failure) (HCC)     Chronic back pain     Depression     Diabetes mellitus (HCC)     Hypertension     Liver disease     Low back pain     Extreme Pain    Mitral valve prolapse     Peripheral neuropathy     Neuropathy    PONV (postoperative nausea and vomiting)     Thyroid disease    [2]   Past Surgical History:  Procedure " Laterality Date    COLONOSCOPY      INCISION AND DRAINAGE OF WOUND Left 2022    Procedure: INCISION AND DRAINAGE (I&D) EXTREMITY;  Surgeon: Moustapha Cote DPM;  Location: [facility];  Service: Podiatry    IR BIOPSY SPINE  2024    IR BIOPSY SPINE  2024    IR PICC PLACEMENT SINGLE LUMEN  2024    JOINT REPLACEMENT Left 2022    Left TSA    VT ARTHRODESIS POSTERIOR/PSTLAT TQ 1NTRSPC LUMBAR Bilateral 2023    Procedure: L1-S1 navigated posterior decompression with instrumented fixation fusion;  Surgeon: James Moraes MD;  Location:  MAIN OR;  Service: Neurosurgery    THYROID SURGERY      remove cancer   [3]   Family History  Problem Relation Name Age of Onset    Hypertension Father Perry San     Diabetes type II Father Perry San     Neuropathy Father Perry San     Dementia Mother Susan Jaswinder     Diabetes Mother Susanlazaro San     Diabetes type II Mother Susan San     Neuropathy Mother Susan San     Colon cancer Neg Hx     [4]   Social History  Tobacco Use    Smoking status: Former     Current packs/day: 0.00     Average packs/day: 0.3 packs/day for 5.9 years (1.5 ttl pk-yrs)     Types: Cigarettes     Start date: 1975     Quit date: 1981     Years since quittin.0    Smokeless tobacco: Never    Tobacco comments:     quit ; smokes when in pain as of 26   Vaping Use    Vaping status: Former    Substances: THC, CBD   Substance Use Topics    Alcohol use: Not Currently     Alcohol/week: 4.0 - 7.0 standard drinks of alcohol     Types: 1 - 2 Glasses of wine, 2 - 3 Cans of beer, 1 - 2 Shots of liquor per week     Comment: only on special occasions    Drug use: Not Currently     Frequency: 7.0 times per week     Types: Marijuana     Comment: Medical Marijuana        Osmar Roque,   [phone]

## 2025-06-23 ENCOUNTER — VBI (OUTPATIENT)
Dept: FAMILY MEDICINE CLINIC | Facility: CLINIC | Age: 70
End: 2025-06-23

## 2025-06-23 ENCOUNTER — PATIENT OUTREACH (OUTPATIENT)
Dept: CASE MANAGEMENT | Facility: OTHER | Age: 70
End: 2025-06-23

## 2025-06-23 NOTE — TELEPHONE ENCOUNTER
06/23/25 11:01 AM    Patient contacted post ED visit, first outreach attempt made. Message was left for patient to return a call to the VBI Department at [facility]: Phone [phone].    Thank you.  NICOLLE FONTANEZ MA  [facility]

## 2025-06-23 NOTE — DISCHARGE INSTRUCTIONS
Please stop ULTRAM for the next 2 days while you take as needed Percocet, discuss with your neurosurgeon Dr Oneyda Lowery at scheduled appointment next step.

## 2025-06-23 NOTE — SEPSIS NOTE
"Sepsis Note   Juan San 69 y.o. male MRN: [id number]  Unit/Bed#: ED 11 Encounter: [id number]       Initial Sepsis Screening       Row Name 06/22/25 2020                Is the patient's history suggestive of a new or worsening infection? Yes (Proceed)  -MANISH        Suspected source of infection infected joint  -MANISH        Indicate SIRS criteria Tachycardia > 90 bpm;Leukocytosis (WBC > 19529 IJL) OR Leukopenia (WBC <4000 IJL) OR Bandemia (WBC >10% bands)  -MANISH        Are two or more of the above signs & symptoms of infection both present and new to the patient? Yes (Proceed)  -MANISH        Assess for evidence of organ dysfunction: Are any of the below criteria present within 6 hours of suspected infection and SIRS criteria that are NOT considered to be chronic conditions? Lactate > 2.0  -MANISH        Date of presentation of severe sepsis 06/22/25  -MANISH        Time of presentation of severe sepsis 2020  -MANISH        Sepsis Note: Click \"NEXT\" below (NOT \"close\") to generate sepsis note based on above information. YES (proceed by clicking \"NEXT\")  -MANISH                  User Key  (r) = Recorded By, (t) = Taken By, (c) = Cosigned By      Initials Name Provider Type    MANISH Osmar Roque, DO Physician                   Initial Sepsis Screening       Row Name 06/22/25 2020                   Initial Sepsis Assessment    Is the patient's history suggestive of a new or worsening infection? Yes (Proceed)  -MANISH        Suspected source of infection infected joint  -MANISH        Indicate SIRS criteria Tachycardia > 90 bpm;Leukocytosis (WBC > 44608 IJL) OR Leukopenia (WBC <4000 IJL) OR Bandemia (WBC >10% bands)  -MANISH        Are two or more of the above signs & symptoms of infection both present and new to the patient? Yes (Proceed)  -MANISH           If the answer is yes to both above questions, suspicion of sepsis is present. Proceed with algorithm to determine if severe sepsis or septic shock criteria are met.    Assess for evidence of organ " "dysfunction: Are any of the below criteria present within 6 hours of suspected infection and SIRS criteria that are NOT considered to be chronic conditions? Lactate > 2.0  -MANISH           Based on the above information, the patient meets criteria for SEVERE sepsis. CALL A SEPSIS ALERT. Use sepsis order set.     Date of presentation of severe sepsis 06/22/25  -MANISH        Time of presentation of severe sepsis 2020 -JD                  User Key  (r) = Recorded By, (t) = Taken By, (c) = Cosigned By      Initials Name Provider Type    MANISH Roque DO Physician                     Default Flowsheet Data (Last 720 Hours)       Sepsis Reassess       Row Name 06/22/25 2030                   Repeat Volume Status and Tissue Perfusion Assessment Performed    Date of Reassessment: 06/22/25  -MANISH        Time of Reassessment: 2031 -JD        Sepsis Reassessment Note: Click \"NEXT\" below (NOT \"close\") to generate sepsis reassessment note. YES (proceed by clicking \"NEXT\")  -MANISH        Repeat Volume Status and Tissue Perfusion Assessment Performed --                  User Key  (r) = Recorded By, (t) = Taken By, (c) = Cosigned By      Initials Name Provider Type    MANISH Roque DO Physician                    There is no height or weight on file to calculate BMI.  Wt Readings from Last 1 Encounters:   06/03/25 83.6 kg (184 lb 4 oz)        Ideal body weight: 68.4 kg (150 lb 12.7 oz)  Adjusted ideal body weight: 74.5 kg (164 lb 2.8 oz)    "

## 2025-06-23 NOTE — SEPSIS NOTE
"Sepsis Note   Juan San 69 y.o. male MRN: [id number]  Unit/Bed#: ED 11 Encounter: [id number]       Initial Sepsis Screening       Row Name 06/22/25 2020                Is the patient's history suggestive of a new or worsening infection? Yes (Proceed)  -MANISH        Suspected source of infection infected joint  -MANISH        Indicate SIRS criteria Tachycardia > 90 bpm;Leukocytosis (WBC > 45980 IJL) OR Leukopenia (WBC <4000 IJL) OR Bandemia (WBC >10% bands)  -MANISH        Are two or more of the above signs & symptoms of infection both present and new to the patient? Yes (Proceed)  -MANISH        Assess for evidence of organ dysfunction: Are any of the below criteria present within 6 hours of suspected infection and SIRS criteria that are NOT considered to be chronic conditions? Lactate > 2.0  -MANISH        Date of presentation of severe sepsis 06/22/25  -MANISH        Time of presentation of severe sepsis 2020  -MANISH        Sepsis Note: Click \"NEXT\" below (NOT \"close\") to generate sepsis note based on above information. YES (proceed by clicking \"NEXT\")  -MANISH                  User Key  (r) = Recorded By, (t) = Taken By, (c) = Cosigned By      Initials Name Provider Type    MANISH Osmar Roque, DO Physician                   Initial Sepsis Screening       Row Name 06/22/25 2020                   Initial Sepsis Assessment    Is the patient's history suggestive of a new or worsening infection? Yes (Proceed)  -MANISH        Suspected source of infection infected joint  -MANISH        Indicate SIRS criteria Tachycardia > 90 bpm;Leukocytosis (WBC > 44422 IJL) OR Leukopenia (WBC <4000 IJL) OR Bandemia (WBC >10% bands)  -MANISH        Are two or more of the above signs & symptoms of infection both present and new to the patient? Yes (Proceed)  -MANISH           If the answer is yes to both above questions, suspicion of sepsis is present. Proceed with algorithm to determine if severe sepsis or septic shock criteria are met.    Assess for evidence of organ " dysfunction: Are any of the below criteria present within 6 hours of suspected infection and SIRS criteria that are NOT considered to be chronic conditions? Lactate > 2.0  -MANISH           Based on the above information, the patient meets criteria for SEVERE sepsis. CALL A SEPSIS ALERT. Use sepsis order set.     Date of presentation of severe sepsis 06/22/25  -MANISH        Time of presentation of severe sepsis 2020  -MANISH                  User Key  (r) = Recorded By, (t) = Taken By, (c) = Cosigned By      Initials Name Provider Type    MANISH Osmar Roque DO Physician                         There is no height or weight on file to calculate BMI.  Wt Readings from Last 1 Encounters:   06/03/25 83.6 kg (184 lb 4 oz)        Ideal body weight: 68.4 kg (150 lb 12.7 oz)  Adjusted ideal body weight: 74.5 kg (164 lb 2.8 oz)

## 2025-06-24 ENCOUNTER — PATIENT OUTREACH (OUTPATIENT)
Dept: CASE MANAGEMENT | Facility: OTHER | Age: 70
End: 2025-06-24

## 2025-06-24 ENCOUNTER — HOSPITAL ENCOUNTER (EMERGENCY)
Facility: HOSPITAL | Age: 70
Discharge: HOME | End: 2025-06-24
Attending: EMERGENCY MEDICINE | Admitting: EMERGENCY MEDICINE
Payer: COMMERCIAL

## 2025-06-24 VITALS
RESPIRATION RATE: 14 BRPM | BODY MASS INDEX: 27.98 KG/M2 | WEIGHT: 184 LBS | DIASTOLIC BLOOD PRESSURE: 67 MMHG | OXYGEN SATURATION: 98 % | SYSTOLIC BLOOD PRESSURE: 139 MMHG | TEMPERATURE: 98.5 F | HEART RATE: 70 BPM

## 2025-06-24 DIAGNOSIS — M54.16 CHRONIC RADICULAR LUMBAR PAIN: Primary | ICD-10-CM

## 2025-06-24 DIAGNOSIS — G89.29 CHRONIC RADICULAR LUMBAR PAIN: Primary | ICD-10-CM

## 2025-06-24 LAB
ALBUMIN SERPL BCG-MCNC: 4.6 G/DL (ref 3.5–5)
ALP SERPL-CCNC: 99 U/L (ref 34–104)
ALT SERPL W P-5'-P-CCNC: 15 U/L (ref 7–52)
ANION GAP SERPL CALCULATED.3IONS-SCNC: 12 MMOL/L (ref 4–13)
AST SERPL W P-5'-P-CCNC: 25 U/L (ref 13–39)
BASOPHILS # BLD AUTO: 0.02 THOUSANDS/ÂΜL (ref 0–0.1)
BASOPHILS NFR BLD AUTO: 0 % (ref 0–1)
BILIRUB SERPL-MCNC: 0.58 MG/DL (ref 0.2–1)
BUN SERPL-MCNC: 21 MG/DL (ref 5–25)
CALCIUM SERPL-MCNC: 9.4 MG/DL (ref 8.4–10.2)
CHLORIDE SERPL-SCNC: 98 MMOL/L (ref 96–108)
CO2 SERPL-SCNC: 27 MMOL/L (ref 21–32)
CREAT SERPL-MCNC: 1.19 MG/DL (ref 0.6–1.3)
CRP SERPL QL: 3.1 MG/L
EOSINOPHIL # BLD AUTO: 0 THOUSAND/ÂΜL (ref 0–0.61)
EOSINOPHIL NFR BLD AUTO: 0 % (ref 0–6)
ERYTHROCYTE [DISTWIDTH] IN BLOOD BY AUTOMATED COUNT: 17.1 % (ref 11.6–15.1)
ERYTHROCYTE [SEDIMENTATION RATE] IN BLOOD: 12 MM/HOUR (ref 0–19)
GFR SERPL CREATININE-BSD FRML MDRD: 62 ML/MIN/1.73SQ M
GLUCOSE SERPL-MCNC: 219 MG/DL (ref 65–140)
HCT VFR BLD AUTO: 44.7 % (ref 36.5–49.3)
HGB BLD-MCNC: 13.8 G/DL (ref 12–17)
IMM GRANULOCYTES # BLD AUTO: 0.05 THOUSAND/UL (ref 0–0.2)
IMM GRANULOCYTES NFR BLD AUTO: 0 % (ref 0–2)
LYMPHOCYTES # BLD AUTO: 1.54 THOUSANDS/ÂΜL (ref 0.6–4.47)
LYMPHOCYTES NFR BLD AUTO: 13 % (ref 14–44)
MCH RBC QN AUTO: 26.2 PG (ref 26.8–34.3)
MCHC RBC AUTO-ENTMCNC: 30.9 G/DL (ref 31.4–37.4)
MCV RBC AUTO: 85 FL (ref 82–98)
MONOCYTES # BLD AUTO: 0.74 THOUSAND/ÂΜL (ref 0.17–1.22)
MONOCYTES NFR BLD AUTO: 6 % (ref 4–12)
NEUTROPHILS # BLD AUTO: 9.19 THOUSANDS/ÂΜL (ref 1.85–7.62)
NEUTS SEG NFR BLD AUTO: 81 % (ref 43–75)
NRBC BLD AUTO-RTO: 0 /100 WBCS
PLATELET # BLD AUTO: 274 THOUSANDS/UL (ref 149–390)
PMV BLD AUTO: 10.2 FL (ref 8.9–12.7)
POTASSIUM SERPL-SCNC: 3.4 MMOL/L (ref 3.5–5.3)
PROT SERPL-MCNC: 7.4 G/DL (ref 6.4–8.4)
RBC # BLD AUTO: 5.26 MILLION/UL (ref 3.88–5.62)
SODIUM SERPL-SCNC: 137 MMOL/L (ref 135–147)
WBC # BLD AUTO: 11.54 THOUSAND/UL (ref 4.31–10.16)

## 2025-06-24 PROCEDURE — 80053 COMPREHEN METABOLIC PANEL: CPT | Performed by: PHYSICIAN ASSISTANT

## 2025-06-24 PROCEDURE — 96375 TX/PRO/DX INJ NEW DRUG ADDON: CPT

## 2025-06-24 PROCEDURE — 93005 ELECTROCARDIOGRAM TRACING: CPT

## 2025-06-24 PROCEDURE — 99284 EMERGENCY DEPT VISIT MOD MDM: CPT

## 2025-06-24 PROCEDURE — 85025 COMPLETE CBC W/AUTO DIFF WBC: CPT | Performed by: PHYSICIAN ASSISTANT

## 2025-06-24 PROCEDURE — 85652 RBC SED RATE AUTOMATED: CPT | Performed by: PHYSICIAN ASSISTANT

## 2025-06-24 PROCEDURE — 96374 THER/PROPH/DIAG INJ IV PUSH: CPT

## 2025-06-24 PROCEDURE — 86140 C-REACTIVE PROTEIN: CPT | Performed by: PHYSICIAN ASSISTANT

## 2025-06-24 PROCEDURE — 36415 COLL VENOUS BLD VENIPUNCTURE: CPT | Performed by: PHYSICIAN ASSISTANT

## 2025-06-24 PROCEDURE — 96376 TX/PRO/DX INJ SAME DRUG ADON: CPT

## 2025-06-24 PROCEDURE — 99285 EMERGENCY DEPT VISIT HI MDM: CPT | Performed by: PHYSICIAN ASSISTANT

## 2025-06-24 RX ORDER — ONDANSETRON 2 MG/ML
4 INJECTION INTRAMUSCULAR; INTRAVENOUS ONCE
Status: COMPLETED | OUTPATIENT
Start: 2025-06-24 | End: 2025-06-24

## 2025-06-24 RX ORDER — KETOROLAC TROMETHAMINE 30 MG/ML
15 INJECTION, SOLUTION INTRAMUSCULAR; INTRAVENOUS ONCE
Status: COMPLETED | OUTPATIENT
Start: 2025-06-24 | End: 2025-06-24

## 2025-06-24 RX ORDER — HYDROMORPHONE HCL/PF 1 MG/ML
0.5 SYRINGE (ML) INJECTION ONCE
Status: COMPLETED | OUTPATIENT
Start: 2025-06-24 | End: 2025-06-24

## 2025-06-24 RX ORDER — POTASSIUM CHLORIDE 1500 MG/1
20 TABLET, EXTENDED RELEASE ORAL ONCE
Status: COMPLETED | OUTPATIENT
Start: 2025-06-24 | End: 2025-06-24

## 2025-06-24 RX ADMIN — HYDROMORPHONE HYDROCHLORIDE 0.5 MG: 1 INJECTION, SOLUTION INTRAMUSCULAR; INTRAVENOUS; SUBCUTANEOUS at 16:53

## 2025-06-24 RX ADMIN — ONDANSETRON 4 MG: 2 INJECTION, SOLUTION INTRAMUSCULAR; INTRAVENOUS at 15:31

## 2025-06-24 RX ADMIN — HYDROMORPHONE HYDROCHLORIDE 0.5 MG: 1 INJECTION, SOLUTION INTRAMUSCULAR; INTRAVENOUS; SUBCUTANEOUS at 15:31

## 2025-06-24 RX ADMIN — POTASSIUM CHLORIDE 20 MEQ: 1500 TABLET, EXTENDED RELEASE ORAL at 16:52

## 2025-06-24 RX ADMIN — KETOROLAC TROMETHAMINE 15 MG: 30 INJECTION, SOLUTION INTRAMUSCULAR; INTRAVENOUS at 15:31

## 2025-06-24 NOTE — DISCHARGE INSTRUCTIONS
"Patient Education     Low back pain - Discharge instructions   The Basics   Written by the doctors and editors at [facility]   What are discharge instructions? -- Discharge instructions are information about how to take care of yourself after getting medical care for a health problem.  What is low back pain? -- Low back pain is pain or discomfort in the lower part of your back. Many people have low back pain at some point, and it most often gets better on its own. Many different things can cause low back pain. Most of the time, doctors do not know the exact cause.  Back pain can happen if you strain a muscle. This is often what has happened when a person \"throws out\" their back. This refers to pain that starts suddenly after physical activity, like lifting something heavy or bending the back.  Back pain can also happen if you have (figure 1):   Damaged, bulging, or torn discs   Arthritis affecting the joints of the spine   Bony growths on the vertebrae that crowd nearby nerves   A \"compression fracture\" due to osteoporosis (a condition that makes your bones weak)   A vertebra out of place   Narrowing in the spinal canal   A tumor or infection (but this is very rare)  How do I care for myself at home? -- Ask the doctor or nurse what you should do when you go home. Make sure that you understand exactly what you need to do to care for yourself. Ask questions if there is anything you do not understand.  You should also:   Use heat on your back for short periods of time, if it helps with pain. Put a heating pad (on the low setting) on your back for 20 minutes at a time a few times each day. Never go to sleep with heat on your back.   Try to stay as active as you can without causing too much pain, if your doctor or nurse said that it is OK. If your pain is severe, you might need to rest for a day or 2. But it's important to get back to walking and moving as soon as possible. Try to keep doing your normal daily activities. " "Get up and move around gently during the day as you are able.   Slowly start to increase your activity level as you are able to. If something causes your pain to come back or get worse, stop and go back to doing easier activities that did not hurt.   Avoid sitting or standing in 1 position for a long time. You might want to sleep with a pillow under or between your knees if this eases your pain.   Take a medicine like ibuprofen (sample brand names: Advil, Motrin) or naproxen (brand name: Aleve) for pain, if needed. These are nonsteroidal antiinflammatory drugs (\"NSAIDs\"). They might work better than acetaminophen for low back pain.   Talk to your doctor or nurse before trying any of the following. These treatments might help you feel better for a little while:   Spinal manipulation - This is when a chiropractor, physical therapist, or other professional moves or \"adjusts\" the joints of your back.   Acupuncture - This is when someone who knows traditional Chinese medicine inserts tiny needles through your skin to block pain signals.   Massage therapy - The massage therapist manipulates your muscles and soft tissues to decrease muscle tension and increase relaxation.  What follow-up care do I need? -- Your doctor or nurse will tell you if you need to make a follow-up appointment. If so, make sure that you know when and where to go. The doctor might suggest that you see a physical therapist to learn exercises to help with your back pain.  When should I call the doctor? -- Call for emergency help right away (in the [place] and [place], call 9-1-1) if:   You are unable to walk, or cannot control your bowels or bladder.   You develop a fever of 100.4°F (38°C) or higher, chills, or night sweats.  Call your doctor for advice if:   Your legs are numb, weak, or tingly.   Your pain is getting worse, even with medicines and rest.   You develop a new rash.  All topics are updated as new evidence becomes available and our peer review " process is complete.  This topic retrieved from [employer] on: May 15, 2024.  Topic 775773 Version 1.0  Release: 32.4.3 - C32.134  © 2024 UpToDate, Inc. and/or its affiliates. All rights reserved.  figure 1: Anatomy of the back     This drawing shows the different parts of the back. Back pain can be caused by problems with the muscles, ligaments, discs, bones (vertebrae), or nerves.  Graphic 64568 Version 6.0  Consumer Information Use and Disclaimer   Disclaimer: This generalized information is a limited summary of diagnosis, treatment, and/or medication information. It is not meant to be comprehensive and should be used as a tool to help the user understand and/or assess potential diagnostic and treatment options. It does NOT include all information about conditions, treatments, medications, side effects, or risks that may apply to a specific patient. It is not intended to be medical advice or a substitute for the medical advice, diagnosis, or treatment of a health care provider based on the health care provider's examination and assessment of a patient's specific and unique circumstances. Patients must speak with a health care provider for complete information about their health, medical questions, and treatment options, including any risks or benefits regarding use of medications. This information does not endorse any treatments or medications as safe, effective, or approved for treating a specific patient. UpToDate, Inc. and its affiliates disclaim any warranty or liability relating to this information or the use thereof.The use of this information is governed by the Terms of Use, available at https://www.wolterskluwer.com/en/know/clinical-effectiveness-terms. 2024© UpToDate, Inc. and its affiliates and/or licensors. All rights reserved.  Copyright   © 2024 UpToDate, Inc. and/or its affiliates. All rights reserved.

## 2025-06-24 NOTE — TELEPHONE ENCOUNTER
06/24/25 9:33 AM    Patient contacted post ED visit, second outreach attempt made. Message was left for patient to return a call to the VBI Department at [facility]: Phone [phone].    Thank you.  NICOLLE FONTANEZ MA  [facility]

## 2025-06-24 NOTE — ED PROVIDER NOTES
Time reflects when diagnosis was documented in both MDM as applicable and the Disposition within this note       Time User Action Codes Description Comment    6/24/2025  5:14 PM Kuldip Gallo Add [M54.16,  G89.29] Chronic radicular lumbar pain           ED Disposition       ED Disposition   Discharge    Condition   Stable    Date/Time   Tue Jun 24, 2025  5:14 PM    Comment   Juan San discharge to home/self care.                   Assessment & Plan       Medical Decision Making    Patient is a 69-year-old male with a history of osteomyelitis, discitis, CHF, diabetes mellitus, hypertension, peripheral neuropathy, and no significant past surgical history that presents to the Emergency Department with acute on chronic dull aching left back pain with radiation to left hamstring region for 1 day.    Patient hemodynamically stable and afebrile  No sirs  No red flag symptoms to suggest cauda equina syndrome  Improving CRP 3.1, improving sed rate 12, slight leukocytosis 11.54, no bandemia  Hypokalemia 3.4, repleted yesterday  ECG normal sinus rhythm  Glucose 219 history of diabetes mellitus  Delivered total pain control in the emergency department; patient demonstrates decrease in presenting left lower back pain ED symptomatology status post medication delivery  ECG normal sinus rhythm  Will treat for acute on chronic back pain  Will have patient follow-up with pain control for better management of back pain so that he can successfully make neurosurgery appointment as the office is in [place]   Follow-up with PCP  Follow up with emergency department if symptoms persist or exacerbate  Patient demonstrates verbal understanding of all clinical laboratory and imaging findings, discharge instructions, follow-up, and verbally agrees with current treatment plan with teach back    *Due to voice recognition software, sound alike and misspelled words may be contained in the documentation*    Amount and/or Complexity of Data  "Reviewed  External Data Reviewed: radiology.     Details: 6/22/2022  CT A/P - \"CT ABDOMEN AND PELVIS WITH IV CONTRAST     INDICATION: Right BACK PAIN, VOMITING. .     COMPARISON: CT scan 10/6/2024. Small hiatal hernia. No pericardial or pleural effusion.     TECHNIQUE: CT examination of the abdomen and pelvis was performed. Multiplanar 2D reformatted images were created from the source data.     This examination, like all CT scans performed in the [facility], was performed utilizing techniques to minimize radiation dose exposure, including the use of iterative reconstruction and automated exposure control. Radiation dose length   product (DLP) for this visit: 551 mGy-cm.     IV Contrast: 100 mL of iohexol (OMNIPAQUE)  Enteric Contrast: Not administered.     FINDINGS:     ABDOMEN     LOWER CHEST: No clinically significant abnormality in the visualized lower chest.     LIVER/BILIARY TREE: Unremarkable.     GALLBLADDER: Cholelithiasis without findings of acute cholecystitis.     SPLEEN: Unremarkable.     PANCREAS: Unremarkable.     ADRENAL GLANDS: Unremarkable.     KIDNEYS/URETERS: Bilateral lobulated contours and subcentimeter renal hypodensities unchanged. No hydronephrosis.     STOMACH AND BOWEL: Colonic diverticulosis without findings of acute diverticulitis. No bowel obstruction.     APPENDIX: No findings to suggest appendicitis.     ABDOMINOPELVIC CAVITY: No ascites. No pneumoperitoneum. No lymphadenopathy.     VESSELS: Unremarkable for patient's age.     PELVIS     REPRODUCTIVE ORGANS: Stable prostatomegaly.     URINARY BLADDER: Unremarkable.     ABDOMINAL WALL/INGUINAL REGIONS: Unremarkable.     BONES: Interval placement of a prosthetic disc component at L5-S1 with improved retrolisthesis. Hardware noted from T10-L5 appears intact. Multilevel discitis/osteomyelitis changes appear grossly similar.     IMPRESSION:     No acute inflammatory process identified in the abdomen or pelvis.     Stable " "gallstones without surrounding inflammation.     Stable prostatomegaly.     Interval placement of a prosthetic disc component at L5-S1 with improved retrolisthesis. Posterior fusion hardware noted from T10-L5 appears intact. Multilevel discitis/osteomyelitis changes appear grossly similar.        Workstation performed: [id number]  Labs: ordered. Decision-making details documented in ED Course.  ECG/medicine tests: ordered and independent interpretation performed. Decision-making details documented in ED Course.    Risk  Prescription drug management.        ED Course as of 06/24/25 1935 Tue Jun 24, 2025   1546 WBC(!): 11.54       Medications   ketorolac (TORADOL) injection 15 mg (15 mg Intravenous Given 6/24/25 1531)   HYDROmorphone (DILAUDID) injection 0.5 mg (0.5 mg Intravenous Given 6/24/25 1531)   ondansetron (ZOFRAN) injection 4 mg (4 mg Intravenous Given 6/24/25 1531)   HYDROmorphone (DILAUDID) injection 0.5 mg (0.5 mg Intravenous Given 6/24/25 1653)   potassium chloride (Klor-Con M20) CR tablet 20 mEq (20 mEq Oral Given 6/24/25 1652)       ED Risk Strat Scores                    No data recorded                            History of Present Illness       Chief Complaint   Patient presents with    Nausea     Pt c/o nausea and uncontrolled back pain since last night, seen 2 day ago for same. Was on the way on the to [place] today to see neuro surgery and couldn't tolerate the car ride.      Patient is a 69-year-old male with a history of osteomyelitis, discitis, CHF, diabetes mellitus, hypertension, peripheral neuropathy, and no significant past surgical history that presents to the Emergency Department with acute on chronic dull aching left back pain with radiation to left hamstring region for 1 day.  Patient denies associated symptoms.  Patient was on his way to [facility] today to see his spine specialist and came to the ED because he was currently in too much pain.  PDMP Rx aware oxycodone acetaminophen " 5-3 25, filled on 6/23/2025 for 2 days; patient was recently seen in the emergency department on 6/22/2025 for lower back pain, with patient having SIRS at the time, IV antibiotics, lactic acid elevated, and abs neutrophils CT with osteomyelitis, discitis similar to prior, contacted neurosurgery physician covering for Dr. Call, neurosurgery, with patient patient feeling better in ED after meds, Dr. Nas MITCHELL at [place] commended home follow-up 9 AM with STEPHEN Edwards per physician note on 6/22/2025 in ED.  Patient denies anesthesias and bowel bladder retention.  Patient states that he walks with a walker at baseline.  Patient denies noneffective or provocative actions of pressure to left lower back.  Patient not noneffective treatment.  Patient denies fevers, chills, vomiting, diarrhea, constipation, urinary symptoms.  Patient denies any rash or skin changes.  Patient denies recent falls or trauma.  Patient denies numbness, or loss of power in any oral extremities.  Patient denies chest pain, shortness breath, and abdominal pain.    Past Medical History[1]   Past Surgical History[2]   Family History[3]   Social History[4]   E-Cigarette/Vaping    E-Cigarette Use Former User     Comments medical marijuana - occ       E-Cigarette/Vaping Substances    Nicotine No     THC Yes     CBD Yes     Flavoring No     Other No     Unknown No       I have reviewed and agree with the history as documented.       Nausea  The primary symptoms include nausea. Primary symptoms do not include fever, abdominal pain, vomiting, diarrhea, dysuria, arthralgias or rash.   The illness is also significant for back pain. The illness does not include chills or constipation.       Review of Systems   Constitutional:  Negative for activity change, appetite change, chills and fever.   HENT:  Negative for congestion, ear pain, postnasal drip, rhinorrhea, sinus pressure, sinus pain, sore throat and tinnitus.    Eyes:  Negative for photophobia, pain and  visual disturbance.   Respiratory:  Negative for cough, chest tightness and shortness of breath.    Cardiovascular:  Negative for chest pain and palpitations.   Gastrointestinal:  Positive for nausea. Negative for abdominal pain, constipation, diarrhea and vomiting.   Genitourinary:  Negative for difficulty urinating, dysuria, flank pain, frequency, hematuria and urgency.   Musculoskeletal:  Positive for back pain. Negative for arthralgias, gait problem, neck pain and neck stiffness.   Skin:  Negative for color change, pallor and rash.   Allergic/Immunologic: Negative for environmental allergies and food allergies.   Neurological:  Negative for dizziness, seizures, syncope, weakness, numbness and headaches.   Psychiatric/Behavioral:  Negative for confusion.    All other systems reviewed and are negative.          Objective       ED Triage Vitals   Temperature Pulse Blood Pressure Respirations SpO2 Patient Position - Orthostatic VS   06/24/25 1509 06/24/25 1124 06/24/25 1124 06/24/25 1124 06/24/25 1124 06/24/25 1124   98.5 °F (36.9 °C) 104 132/57 18 100 % Sitting      Temp Source Heart Rate Source BP Location FiO2 (%) Pain Score    06/24/25 1509 06/24/25 1124 06/24/25 1124 -- 06/24/25 1124    Oral Monitor Left arm  9      Vitals      Date and Time Temp Pulse SpO2 Resp BP Pain Score FACES Pain Rating User   06/24/25 1700 -- 70 98 % 14 139/67 -- --    06/24/25 1653 -- -- -- -- -- 10 - Worst Possible Pain --    06/24/25 1630 -- 68 96 % 13 131/61 -- --    06/24/25 1600 -- 76 99 % 17 131/70 -- --    06/24/25 1531 -- -- -- -- -- 7 -- AD   06/24/25 1515 -- 84 99 % 17 133/71 -- --    06/24/25 1509 98.5 °F (36.9 °C) 81 98 % 16 137/67 -- -- CC   06/24/25 1124 -- 104 100 % 18 132/57 9 -- LISA            Physical Exam  Vitals and nursing note reviewed.   Constitutional:       General: He is awake.      Appearance: Normal appearance. He is well-developed and normal weight. He is not ill-appearing, toxic-appearing or  diaphoretic.      Comments: /67   Pulse 70   Temp 98.5 °F (36.9 °C) (Oral)   Resp 14   Wt 83.5 kg (184 lb)   SpO2 98%   BMI 27.98 kg/m²      HENT:      Head: Normocephalic and atraumatic.      Jaw: There is normal jaw occlusion.      Right Ear: Hearing and external ear normal. No decreased hearing noted. No drainage, swelling or tenderness. No mastoid tenderness.      Left Ear: Hearing and external ear normal. No decreased hearing noted. No drainage, swelling or tenderness. No mastoid tenderness.      Nose: Nose normal.      Mouth/Throat:      Lips: Pink.      Mouth: Mucous membranes are moist.      Pharynx: Oropharynx is clear. Uvula midline.     Eyes:      General: Lids are normal. Vision grossly intact. Gaze aligned appropriately.         Right eye: No discharge.         Left eye: No discharge.      Extraocular Movements: Extraocular movements intact.      Conjunctiva/sclera: Conjunctivae normal.      Pupils: Pupils are equal, round, and reactive to light.     Neck:      Vascular: No JVD.      Trachea: Trachea and phonation normal. No tracheal tenderness or tracheal deviation.     Cardiovascular:      Rate and Rhythm: Normal rate and regular rhythm.      Pulses: Normal pulses.           Radial pulses are 2+ on the right side and 2+ on the left side.        Dorsalis pedis pulses are 2+ on the right side and 2+ on the left side.        Posterior tibial pulses are 2+ on the right side and 2+ on the left side.      Heart sounds: Normal heart sounds.   Pulmonary:      Effort: Pulmonary effort is normal.      Breath sounds: Normal breath sounds and air entry. No stridor. No decreased breath sounds, wheezing, rhonchi or rales.   Chest:      Chest wall: No tenderness.   Abdominal:      General: Abdomen is flat. Bowel sounds are normal. There is no distension.      Palpations: Abdomen is soft. Abdomen is not rigid.      Tenderness: There is no abdominal tenderness. There is no guarding or rebound.      Musculoskeletal:         General: Normal range of motion.        Arms:       Cervical back: Full passive range of motion without pain, normal range of motion and neck supple. No rigidity. No spinous process tenderness or muscular tenderness. Normal range of motion.      Comments: Passive ROM intact  Upper and lower extremity 5/5 bilaterally  Neurovascularly intact  No grinding or clicking of joints       Feet:      Right foot:      Toenail Condition: Right toenails are normal.      Left foot:      Toenail Condition: Left toenails are normal.   Lymphadenopathy:      Head:      Right side of head: No submental, submandibular, tonsillar, preauricular, posterior auricular or occipital adenopathy.      Left side of head: No submental, submandibular, tonsillar, preauricular, posterior auricular or occipital adenopathy.      Cervical: No cervical adenopathy.      Right cervical: No superficial, deep or posterior cervical adenopathy.     Left cervical: No superficial, deep or posterior cervical adenopathy.     Skin:     General: Skin is warm.      Capillary Refill: Capillary refill takes less than 2 seconds.      Findings: No rash.     Neurological:      General: No focal deficit present.      Mental Status: He is alert and oriented to person, place, and time. Mental status is at baseline.      GCS: GCS eye subscore is 4. GCS verbal subscore is 5. GCS motor subscore is 6.      Cranial Nerves: Cranial nerves 2-12 are intact.      Sensory: No sensory deficit.      Deep Tendon Reflexes: Reflexes are normal and symmetric.      Reflex Scores:       Patellar reflexes are 2+ on the right side and 2+ on the left side.    Psychiatric:         Attention and Perception: Attention normal.         Mood and Affect: Mood normal.         Speech: Speech normal.         Behavior: Behavior normal. Behavior is cooperative.         Thought Content: Thought content normal.         Judgment: Judgment normal.         Results Reviewed        Procedure Component Value Units Date/Time    C-reactive protein [[id number]]  (Abnormal) Collected: 06/24/25 1527    Lab Status: Final result Specimen: Blood from Arm, Right Updated: 06/24/25 1613     CRP 3.1 mg/L     Comprehensive metabolic panel [[id number]]  (Abnormal) Collected: 06/24/25 1527    Lab Status: Final result Specimen: Blood from Arm, Right Updated: 06/24/25 1613     Sodium 137 mmol/L      Potassium 3.4 mmol/L      Chloride 98 mmol/L      CO2 27 mmol/L      ANION GAP 12 mmol/L      BUN 21 mg/dL      Creatinine 1.19 mg/dL      Glucose 219 mg/dL      Calcium 9.4 mg/dL      AST 25 U/L      ALT 15 U/L      Alkaline Phosphatase 99 U/L      Total Protein 7.4 g/dL      Albumin 4.6 g/dL      Total Bilirubin 0.58 mg/dL      eGFR 62 ml/min/1.73sq m     Narrative:      National Kidney Disease Foundation guidelines for Chronic Kidney Disease (CKD):     Stage 1 with normal or high GFR (GFR > 90 mL/min/1.73 square meters)    Stage 2 Mild CKD (GFR = 60-89 mL/min/1.73 square meters)    Stage 3A Moderate CKD (GFR = 45-59 mL/min/1.73 square meters)    Stage 3B Moderate CKD (GFR = 30-44 mL/min/1.73 square meters)    Stage 4 Severe CKD (GFR = 15-29 mL/min/1.73 square meters)    Stage 5 End Stage CKD (GFR <15 mL/min/1.73 square meters)  Note: GFR calculation is accurate only with a steady state creatinine    Sedimentation rate, automated [[id number]]  (Normal) Collected: 06/24/25 1527    Lab Status: Final result Specimen: Blood from Arm, Right Updated: 06/24/25 1556     Sed Rate 12 mm/hour     CBC and differential [[id number]]  (Abnormal) Collected: 06/24/25 1527    Lab Status: Final result Specimen: Blood from Arm, Right Updated: 06/24/25 1543     WBC 11.54 Thousand/uL      RBC 5.26 Million/uL      Hemoglobin 13.8 g/dL      Hematocrit 44.7 %      MCV 85 fL      MCH 26.2 pg      MCHC 30.9 g/dL      RDW 17.1 %      MPV 10.2 fL      Platelets 274 Thousands/uL      nRBC 0 /100 WBCs      Segmented % 81 %      Immature Grans % 0 %       Lymphocytes % 13 %      Monocytes % 6 %      Eosinophils Relative 0 %      Basophils Relative 0 %      Absolute Neutrophils 9.19 Thousands/µL      Absolute Immature Grans 0.05 Thousand/uL      Absolute Lymphocytes 1.54 Thousands/µL      Absolute Monocytes 0.74 Thousand/µL      Eosinophils Absolute 0.00 Thousand/µL      Basophils Absolute 0.02 Thousands/µL             No orders to display       ECG 12 Lead Documentation Only    Date/Time: 2025 3:55 PM    Performed by: Kuldip Gallo PA-C  Authorized by: Kuldip Gallo PA-C    Indications / Diagnosis:  Nausea  ECG reviewed by me, the ED Provider: yes    Patient location:  ED  Previous ECG:     Previous ECG:  Compared to current    Comparison ECG info:  When compared with ECG on 2025, no sick significant changes were noted.    Similarity:  No change    Comparison to cardiac monitor: Yes    Interpretation:     Interpretation: normal    Rate:     ECG rate:  78    ECG rate assessment: normal    Rhythm:     Rhythm: sinus rhythm    Ectopy:     Ectopy: none    QRS:     QRS axis:  Normal    QRS intervals:  Normal  Conduction:     Conduction: normal    ST segments:     ST segments:  Normal  T waves:     T waves: normal        ED Medication and Procedure Management   Prior to Admission Medications   Prescriptions Last Dose Informant Patient Reported? Taking?   ACCU-CHEK FASTCLIX LANCETS MISC  Spouse/Significant Other, Self Yes No   Si (four) times a day   Patient not taking: Reported on 2025   Blood Glucose Monitoring Suppl (OneTouch Verio Reflect) w/Device KIT   No No   Sig: Check blood sugars four times daily. Please substitute with appropriate alternative as covered by patient's insurance. Dx: E11.65   Patient not taking: Reported on 2025   CANNABIDIOL PO  Spouse/Significant Other, Self Yes No   Jardiance 10 MG TABS tablet   No No   Sig: Take 1 tablet (10 mg total) by mouth every morning   Lactobacillus Rhamnosus, GG, (Culturelle) CAPS   Spouse/Significant Other, Self Yes No   Sig: Take 1 capsule by mouth in the morning.   NIFEdipine (ADALAT CC) 90 mg 24 hr tablet  Spouse/Significant Other, Self Yes No   Sig: Take 90 mg by mouth in the morning.   NIFEdipine (PROCARDIA XL) 30 mg 24 hr tablet  Self, Spouse/Significant Other No No   Sig: Take 1 tablet (30 mg total) by mouth daily With nifedipine 90 mg to total 120 mg daily   OneTouch Delanna Lancets 33G MISC   No No   Sig: Check blood sugars four times daily. Please substitute with appropriate alternative as covered by patient's insurance. Dx: E11.65   Patient not taking: Reported on 5/9/2025   acetaminophen (TYLENOL) 500 mg tablet  Spouse/Significant Other, Self No No   Sig: Take 2 tablets (1,000 mg total) by mouth every 8 (eight) hours 500 mg tablet   busPIRone (BUSPAR) 5 mg tablet   No No   Sig: Take 1 tablet (5 mg total) by mouth as needed in the morning and 1 tablet (5 mg total) as needed in the evening (Anxiety).   clonazePAM (KlonoPIN) 1 mg tablet   No No   Sig: Take 1 tablet (1 mg total) by mouth 2 (two) times a day & 3rd pill PRN for anxiety   cyclobenzaprine (FLEXERIL) 5 mg tablet  Self, Spouse/Significant Other No No   Sig: Take 1 tablet (5 mg total) by mouth 3 (three) times a day as needed for muscle spasms   doxycycline hyclate (VIBRAMYCIN) 100 mg capsule  Spouse/Significant Other, Self Yes No   Sig: Take 100 mg by mouth in the morning and 100 mg in the evening.   enoxaparin (LOVENOX) 40 mg/0.4 mL   Yes No   Sig: Inject 40 mg under the skin daily   Patient not taking: Reported on 5/9/2025   escitalopram (Lexapro) 20 mg tablet  Spouse/Significant Other, Self Yes No   Sig: Take 20 mg by mouth in the morning.   ferrous sulfate 325 (65 Fe) mg tablet   Yes No   Sig: Take 1 tablet by mouth every other day   finasteride (PROSCAR) 5 mg tablet  Self, Spouse/Significant Other No No   Sig: Take 1 tablet (5 mg total) by mouth daily   folic acid (FOLVITE) 1 mg tablet  Spouse/Significant Other, Self Yes  No   Sig: Take 2,000 mcg by mouth in the morning.   furosemide (LASIX) 20 mg tablet   No No   Sig: Take 1 tablet (20 mg total) by mouth daily take 1 tab by mouth daily as needed for weight gain more than 3lbs overnight or more than 5lb in a week, or LE swelling.   gabapentin (NEURONTIN) 400 mg capsule  Self, Spouse/Significant Other No No   Sig: Take 1 capsule by mouth twice daily   glucose blood (OneTouch Verio) test strip   No No   Sig: Check blood sugars four times daily. Please substitute with appropriate alternative as covered by patient's insurance. Dx: E11.65   Patient not taking: Reported on 5/9/2025   guaiFENesin (ROBITUSSIN) 100 MG/5ML oral liquid   No No   Sig: Take 10 mL (200 mg total) by mouth 3 (three) times a day as needed for cough   hydrOXYzine pamoate (VISTARIL) 50 mg capsule  Self, Spouse/Significant Other No No   Sig: Take 1 capsule (50 mg total) by mouth 2 (two) times a day   ketoconazole (NIZORAL) 2 % shampoo  Self, Spouse/Significant Other No No   Sig: Apply 1 Application topically 2 (two) times a week Use as directed   lidocaine (LIDODERM) 5 %  Spouse/Significant Other, Self No No   Sig: Apply 1 patch topically over 12 hours daily Remove & Discard patch within 12 hours or as directed by MD   Patient not taking: Reported on 5/9/2025   losartan-hydrochlorothiazide (HYZAAR) 100-25 MG per tablet   No No   Sig: Take 1 tablet by mouth in the morning   lovastatin (MEVACOR) 20 mg tablet  Self, Spouse/Significant Other No No   Sig: Take 1 tablet (20 mg total) by mouth every morning   metFORMIN (GLUCOPHAGE) 1000 MG tablet   No No   Sig: Take 1 tablet (1,000 mg total) by mouth daily with breakfast   metoclopramide (REGLAN) 10 mg tablet  Spouse/Significant Other, Self Yes No   Sig: Take 10 mg by mouth in the morning and 10 mg in the evening. Take before meals.   metoprolol succinate (TOPROL-XL) 50 mg 24 hr tablet  Spouse/Significant Other, Self Yes No   Sig: Take 50 mg by mouth in the morning and 50 mg  before bedtime.   mirtazapine (REMERON) 45 MG tablet  Self, Spouse/Significant Other No No   Sig: TAKE 1 TABLET BY MOUTH ONCE DAILY AT BEDTIME   naloxone (NARCAN) 4 mg/0.1 mL nasal spray   No No   Si.1 mL (4 mg total) into each nostril every 3 (three) minutes as needed for opioid reversal or respiratory depression   Patient not taking: Reported on 2025   ondansetron (ZOFRAN-ODT) 4 mg disintegrating tablet   No No   Sig: Take 1 tablet (4 mg total) by mouth every 8 (eight) hours as needed for nausea or vomiting for up to 5 days   ondansetron (Zofran ODT) 4 mg disintegrating tablet  Spouse/Significant Other, Self No No   Sig: Take 1 tablet (4 mg total) by mouth every 6 (six) hours as needed for nausea or vomiting   oxyCODONE-acetaminophen (Percocet) 5-325 mg per tablet   No No   Sig: Take 1 tablet by mouth every 8 (eight) hours as needed for moderate pain for up to 10 days Max Daily Amount: 3 tablets   pantoprazole (PROTONIX) 40 mg tablet   No No   Sig: Take 1 tablet (40 mg total) by mouth in the morning and 1 tablet (40 mg total) before bedtime.   polyethylene glycol (MIRALAX) 17 g packet  Spouse/Significant Other, Self Yes No   Sig: Take 17 g by mouth   Patient not taking: Reported on 2025   senna (SENOKOT) 8.6 mg  Spouse/Significant Other, Self No No   Sig: Take 1 tablet (8.6 mg total) by mouth 2 (two) times a day 2 tabs at bedtime as needed for constipation per latest dci   tamsulosin (FLOMAX) 0.4 mg  Spouse/Significant Other, Self No No   Sig: TAKE 1 CAPSULE BY MOUTH DAILY WITH DINNER   traMADol (Ultram) 50 mg tablet   No No   Sig: Take 1 tablet (50 mg total) by mouth every 6 (six) hours as needed for moderate pain   triamcinolone (KENALOG) 0.1 % ointment  Self, Spouse/Significant Other No No   Sig: Apply topically 2 (two) times a day   Patient not taking: Reported on 2025      Facility-Administered Medications: None     Discharge Medication List as of 2025  5:17 PM        CONTINUE these  medications which have NOT CHANGED    Details   !! ACCU-CHEK FASTCLIX LANCETS MISC 4 (four) times a day, Starting Sat 12/22/2018, Historical Med      acetaminophen (TYLENOL) 500 mg tablet Take 2 tablets (1,000 mg total) by mouth every 8 (eight) hours 500 mg tablet, Starting Tue 5/21/2024, No Print      Blood Glucose Monitoring Suppl (OneTouch Verio Reflect) w/Device KIT Check blood sugars four times daily. Please substitute with appropriate alternative as covered by patient's insurance. Dx: E11.65, Normal      busPIRone (BUSPAR) 5 mg tablet Take 1 tablet (5 mg total) by mouth as needed in the morning and 1 tablet (5 mg total) as needed in the evening (Anxiety)., Starting Wed 6/4/2025, Normal      CANNABIDIOL PO Historical Med      clonazePAM (KlonoPIN) 1 mg tablet Take 1 tablet (1 mg total) by mouth 2 (two) times a day & 3rd pill PRN for anxiety, Starting Tue 6/17/2025, Normal      cyclobenzaprine (FLEXERIL) 5 mg tablet Take 1 tablet (5 mg total) by mouth 3 (three) times a day as needed for muscle spasms, Starting Mon 1/20/2025, Normal      doxycycline hyclate (VIBRAMYCIN) 100 mg capsule Take 100 mg by mouth in the morning and 100 mg in the evening., Starting Sat 8/10/2024, Historical Med      enoxaparin (LOVENOX) 40 mg/0.4 mL Inject 40 mg under the skin daily, Starting Wed 3/26/2025, Historical Med      escitalopram (Lexapro) 20 mg tablet Take 20 mg by mouth in the morning., Starting Wed 5/29/2024, Historical Med      ferrous sulfate 325 (65 Fe) mg tablet Take 1 tablet by mouth every other day, Starting Wed 3/26/2025, Historical Med      finasteride (PROSCAR) 5 mg tablet Take 1 tablet (5 mg total) by mouth daily, Starting Tue 2/4/2025, Normal      folic acid (FOLVITE) 1 mg tablet Take 2,000 mcg by mouth in the morning., Starting Mon 12/17/2018, Historical Med      furosemide (LASIX) 20 mg tablet Take 1 tablet (20 mg total) by mouth daily take 1 tab by mouth daily as needed for weight gain more than 3lbs overnight  or more than 5lb in a week, or LE swelling., Starting Sun 5/11/2025, Normal      gabapentin (NEURONTIN) 400 mg capsule Take 1 capsule by mouth twice daily, Starting Tue 2/11/2025, Normal      glucose blood (OneTouch Verio) test strip Check blood sugars four times daily. Please substitute with appropriate alternative as covered by patient's insurance. Dx: E11.65, Normal      guaiFENesin (ROBITUSSIN) 100 MG/5ML oral liquid Take 10 mL (200 mg total) by mouth 3 (three) times a day as needed for cough, Starting Fri 5/16/2025, Normal      hydrOXYzine pamoate (VISTARIL) 50 mg capsule Take 1 capsule (50 mg total) by mouth 2 (two) times a day, Starting Mon 2/10/2025, Normal      Jardiance 10 MG TABS tablet Take 1 tablet (10 mg total) by mouth every morning, Starting Wed 4/30/2025, No Print      ketoconazole (NIZORAL) 2 % shampoo Apply 1 Application topically 2 (two) times a week Use as directed, Starting Thu 1/16/2025, Normal      Lactobacillus Rhamnosus, GG, (Culturelle) CAPS Take 1 capsule by mouth in the morning., Starting Tue 7/30/2024, Historical Med      lidocaine (LIDODERM) 5 % Apply 1 patch topically over 12 hours daily Remove & Discard patch within 12 hours or as directed by MD, Starting Tue 2/27/2024, Normal      losartan-hydrochlorothiazide (HYZAAR) 100-25 MG per tablet Take 1 tablet by mouth in the morning, Starting Wed 4/30/2025, Normal      lovastatin (MEVACOR) 20 mg tablet Take 1 tablet (20 mg total) by mouth every morning, Starting Thu 1/2/2025, Normal      metFORMIN (GLUCOPHAGE) 1000 MG tablet Take 1 tablet (1,000 mg total) by mouth daily with breakfast, Starting Wed 4/30/2025, Normal      metoclopramide (REGLAN) 10 mg tablet Take 10 mg by mouth in the morning and 10 mg in the evening. Take before meals., Starting Sat 11/16/2024, Historical Med      metoprolol succinate (TOPROL-XL) 50 mg 24 hr tablet Take 50 mg by mouth in the morning and 50 mg before bedtime., Starting Wed 4/3/2024, Historical Med       mirtazapine (REMERON) 45 MG tablet TAKE 1 TABLET BY MOUTH ONCE DAILY AT BEDTIME, Starting Thu 2/27/2025, Normal      naloxone (NARCAN) 4 mg/0.1 mL nasal spray 0.1 mL (4 mg total) into each nostril every 3 (three) minutes as needed for opioid reversal or respiratory depression, Starting Tue 4/29/2025, Normal      !! NIFEdipine (ADALAT CC) 90 mg 24 hr tablet Take 90 mg by mouth in the morning., Starting Wed 11/20/2024, Historical Med      !! NIFEdipine (PROCARDIA XL) 30 mg 24 hr tablet Take 1 tablet (30 mg total) by mouth daily With nifedipine 90 mg to total 120 mg daily, Starting Wed 1/22/2025, Until Mon 7/21/2025, Normal      !! ondansetron (Zofran ODT) 4 mg disintegrating tablet Take 1 tablet (4 mg total) by mouth every 6 (six) hours as needed for nausea or vomiting, Starting Tue 5/21/2024, Normal      !! ondansetron (ZOFRAN-ODT) 4 mg disintegrating tablet Take 1 tablet (4 mg total) by mouth every 8 (eight) hours as needed for nausea or vomiting for up to 5 days, Starting Sun 6/22/2025, Until Fri 6/27/2025 at 2359, Normal      !! OneTouch Delica Lancets 33G MISC Check blood sugars four times daily. Please substitute with appropriate alternative as covered by patient's insurance. Dx: E11.65, Normal      oxyCODONE-acetaminophen (Percocet) 5-325 mg per tablet Take 1 tablet by mouth every 8 (eight) hours as needed for moderate pain for up to 10 days Max Daily Amount: 3 tablets, Starting Sun 6/22/2025, Until Wed 7/2/2025 at 2359, Normal      pantoprazole (PROTONIX) 40 mg tablet Take 1 tablet (40 mg total) by mouth in the morning and 1 tablet (40 mg total) before bedtime., Starting Wed 6/4/2025, Normal      polyethylene glycol (MIRALAX) 17 g packet Take 17 g by mouth, Starting Tue 6/18/2024, Historical Med      senna (SENOKOT) 8.6 mg Take 1 tablet (8.6 mg total) by mouth 2 (two) times a day 2 tabs at bedtime as needed for constipation per latest dci, Starting Tue 5/21/2024, No Print      tamsulosin (FLOMAX) 0.4 mg TAKE  1 CAPSULE BY MOUTH DAILY WITH DINNER, Starting Tue 8/13/2024, Normal      traMADol (Ultram) 50 mg tablet Take 1 tablet (50 mg total) by mouth every 6 (six) hours as needed for moderate pain, Starting Wed 6/11/2025, Normal      triamcinolone (KENALOG) 0.1 % ointment Apply topically 2 (two) times a day, Starting Tue 12/17/2024, Normal       !! - Potential duplicate medications found. Please discuss with provider.        No discharge procedures on file.  ED SEPSIS DOCUMENTATION   Time reflects when diagnosis was documented in both MDM as applicable and the Disposition within this note       Time User Action Codes Description Comment    6/24/2025  5:14 PM Kuldip Gallo Add [M54.16,  G89.29] Chronic radicular lumbar pain                      [1]   Past Medical History:  Diagnosis Date    Allergic     Anemia     Anxiety     Arthritis     Cancer (HCC)     CHF (congestive heart failure) (HCC)     Chronic back pain     Depression     Diabetes mellitus (HCC)     Hypertension     Liver disease     Low back pain     Extreme Pain    Mitral valve prolapse     Peripheral neuropathy     Neuropathy    PONV (postoperative nausea and vomiting)     Thyroid disease    [2]   Past Surgical History:  Procedure Laterality Date    COLONOSCOPY      INCISION AND DRAINAGE OF WOUND Left 08/12/2022    Procedure: INCISION AND DRAINAGE (I&D) EXTREMITY;  Surgeon: Moustapha Cote DPM;  Location:  MAIN OR;  Service: Podiatry    IR BIOPSY SPINE  1/30/2024    IR BIOPSY SPINE  2/1/2024    IR PICC PLACEMENT SINGLE LUMEN  2/6/2024    JOINT REPLACEMENT Left 03/11/2022    Left TSA    [place] ARTHRODESIS POSTERIOR/PSTLAT TQ 1NTRSPC LUMBAR Bilateral 04/11/2023    Procedure: L1-S1 navigated posterior decompression with instrumented fixation fusion;  Surgeon: James Moraes MD;  Location:  MAIN OR;  Service: Neurosurgery    THYROID SURGERY  2021    remove cancer   [3]   Family History  Problem Relation Name Age of Onset    Hypertension Father Perry San      Diabetes type II Father Perry San     Neuropathy Father Perry San     Dementia Mother Susan San     Diabetes Mother Susan San     Diabetes type II Mother Susan San     Neuropathy Mother Susan San     Colon cancer Neg Hx     [4]   Social History  Tobacco Use    Smoking status: Former     Current packs/day: 0.00     Average packs/day: 0.3 packs/day for 5.9 years (1.5 ttl pk-yrs)     Types: Cigarettes     Start date: 1975     Quit date: 1981     Years since quittin.0    Smokeless tobacco: Never    Tobacco comments:     quit ; smokes when in pain as of 26   Vaping Use    Vaping status: Former    Substances: THC, CBD   Substance Use Topics    Alcohol use: Not Currently     Alcohol/week: 4.0 - 7.0 standard drinks of alcohol     Types: 1 - 2 Glasses of wine, 2 - 3 Cans of beer, 1 - 2 Shots of liquor per week     Comment: only on special occasions    Drug use: Not Currently     Frequency: 7.0 times per week     Types: Marijuana     Comment: Medical Marijuana        Kuldip Gallo PA-C  27

## 2025-06-24 NOTE — PROGRESS NOTES
Outpatient Care Management Note:  Referral for care management received after recent ER visit.  Chart review completed.     History includes HTN, HF, alcoholic cirrhosis, Dm with recent A1C of 5.8, thyroid cancer, chronic back pain, lumbar spine fusion, bipolar, anxiety and depression.     6/22/2025-Presented to ED with lower back pain, nausea and vomiting.  Given Zofran, dilaudid, toradol,  and Percocet in ER.  Imaging shows post operative changes with no concerning findings. WBC elevated at 13.69, given IV antibiotics for same.  Discharged to home with instructions to follow up with neurosurgery (Sergey) at scheduled appointment.     Outreach call attempted to Mr. San.  Message left for patient to please return call.  Contact information left on message.     Second call attempt scheduled for 6/26/2025.  In basket sent to primary RN YANN.

## 2025-06-25 ENCOUNTER — VBI (OUTPATIENT)
Dept: FAMILY MEDICINE CLINIC | Facility: CLINIC | Age: 70
End: 2025-06-25

## 2025-06-25 ENCOUNTER — HOSPITAL ENCOUNTER (EMERGENCY)
Facility: HOSPITAL | Age: 70
Discharge: HOME | End: 2025-06-25
Attending: EMERGENCY MEDICINE | Admitting: EMERGENCY MEDICINE
Payer: COMMERCIAL

## 2025-06-25 VITALS
SYSTOLIC BLOOD PRESSURE: 117 MMHG | HEART RATE: 90 BPM | DIASTOLIC BLOOD PRESSURE: 59 MMHG | TEMPERATURE: 98.1 F | RESPIRATION RATE: 17 BRPM | OXYGEN SATURATION: 99 %

## 2025-06-25 DIAGNOSIS — M54.9 BACK PAIN: Primary | ICD-10-CM

## 2025-06-25 DIAGNOSIS — Z98.1 STATUS POST LUMBAR SPINAL FUSION: ICD-10-CM

## 2025-06-25 DIAGNOSIS — E78.00 HYPERCHOLESTEROLEMIA: ICD-10-CM

## 2025-06-25 DIAGNOSIS — M54.16 LUMBAR RADICULOPATHY: ICD-10-CM

## 2025-06-25 LAB
ALBUMIN SERPL BCG-MCNC: 5 G/DL (ref 3.5–5)
ALP SERPL-CCNC: 107 U/L (ref 34–104)
ALT SERPL W P-5'-P-CCNC: 22 U/L (ref 7–52)
ANION GAP SERPL CALCULATED.3IONS-SCNC: 11 MMOL/L (ref 4–13)
AST SERPL W P-5'-P-CCNC: 43 U/L (ref 13–39)
ATRIAL RATE: 78 BPM
BASOPHILS # BLD AUTO: 0.04 THOUSANDS/ÂΜL (ref 0–0.1)
BASOPHILS NFR BLD AUTO: 0 % (ref 0–1)
BILIRUB SERPL-MCNC: 0.72 MG/DL (ref 0.2–1)
BUN SERPL-MCNC: 22 MG/DL (ref 5–25)
CALCIUM SERPL-MCNC: 9.8 MG/DL (ref 8.4–10.2)
CHLORIDE SERPL-SCNC: 96 MMOL/L (ref 96–108)
CO2 SERPL-SCNC: 29 MMOL/L (ref 21–32)
CREAT SERPL-MCNC: 1.27 MG/DL (ref 0.6–1.3)
CRP SERPL QL: 2.4 MG/L
EOSINOPHIL # BLD AUTO: 0.02 THOUSAND/ÂΜL (ref 0–0.61)
EOSINOPHIL NFR BLD AUTO: 0 % (ref 0–6)
ERYTHROCYTE [DISTWIDTH] IN BLOOD BY AUTOMATED COUNT: 16.9 % (ref 11.6–15.1)
ERYTHROCYTE [SEDIMENTATION RATE] IN BLOOD: 11 MM/HOUR (ref 0–19)
GFR SERPL CREATININE-BSD FRML MDRD: 58 ML/MIN/1.73SQ M
GLUCOSE SERPL-MCNC: 192 MG/DL (ref 65–140)
HCT VFR BLD AUTO: 47.8 % (ref 36.5–49.3)
HGB BLD-MCNC: 14.8 G/DL (ref 12–17)
IMM GRANULOCYTES # BLD AUTO: 0.06 THOUSAND/UL (ref 0–0.2)
IMM GRANULOCYTES NFR BLD AUTO: 1 % (ref 0–2)
LYMPHOCYTES # BLD AUTO: 1.93 THOUSANDS/ÂΜL (ref 0.6–4.47)
LYMPHOCYTES NFR BLD AUTO: 16 % (ref 14–44)
MCH RBC QN AUTO: 26.2 PG (ref 26.8–34.3)
MCHC RBC AUTO-ENTMCNC: 31 G/DL (ref 31.4–37.4)
MCV RBC AUTO: 85 FL (ref 82–98)
MONOCYTES # BLD AUTO: 0.7 THOUSAND/ÂΜL (ref 0.17–1.22)
MONOCYTES NFR BLD AUTO: 6 % (ref 4–12)
NEUTROPHILS # BLD AUTO: 9.48 THOUSANDS/ÂΜL (ref 1.85–7.62)
NEUTS SEG NFR BLD AUTO: 77 % (ref 43–75)
NRBC BLD AUTO-RTO: 0 /100 WBCS
P AXIS: 30 DEGREES
PLATELET # BLD AUTO: 272 THOUSANDS/UL (ref 149–390)
PMV BLD AUTO: 10.4 FL (ref 8.9–12.7)
POTASSIUM SERPL-SCNC: 3.9 MMOL/L (ref 3.5–5.3)
PR INTERVAL: 126 MS
PROT SERPL-MCNC: 8.2 G/DL (ref 6.4–8.4)
QRS AXIS: 52 DEGREES
QRSD INTERVAL: 82 MS
QT INTERVAL: 406 MS
QTC INTERVAL: 462 MS
RBC # BLD AUTO: 5.64 MILLION/UL (ref 3.88–5.62)
SODIUM SERPL-SCNC: 136 MMOL/L (ref 135–147)
T WAVE AXIS: 57 DEGREES
VENTRICULAR RATE: 78 BPM
WBC # BLD AUTO: 12.23 THOUSAND/UL (ref 4.31–10.16)

## 2025-06-25 PROCEDURE — 80053 COMPREHEN METABOLIC PANEL: CPT | Performed by: EMERGENCY MEDICINE

## 2025-06-25 PROCEDURE — 86140 C-REACTIVE PROTEIN: CPT | Performed by: EMERGENCY MEDICINE

## 2025-06-25 PROCEDURE — 99284 EMERGENCY DEPT VISIT MOD MDM: CPT | Performed by: EMERGENCY MEDICINE

## 2025-06-25 PROCEDURE — 85652 RBC SED RATE AUTOMATED: CPT | Performed by: EMERGENCY MEDICINE

## 2025-06-25 PROCEDURE — NC001 PR NO CHARGE: Performed by: EMERGENCY MEDICINE

## 2025-06-25 PROCEDURE — 85025 COMPLETE CBC W/AUTO DIFF WBC: CPT | Performed by: EMERGENCY MEDICINE

## 2025-06-25 PROCEDURE — 96374 THER/PROPH/DIAG INJ IV PUSH: CPT

## 2025-06-25 PROCEDURE — 36415 COLL VENOUS BLD VENIPUNCTURE: CPT | Performed by: EMERGENCY MEDICINE

## 2025-06-25 PROCEDURE — 93010 ELECTROCARDIOGRAM REPORT: CPT | Performed by: INTERNAL MEDICINE

## 2025-06-25 PROCEDURE — 99283 EMERGENCY DEPT VISIT LOW MDM: CPT

## 2025-06-25 RX ORDER — HYDROMORPHONE HCL/PF 1 MG/ML
1 SYRINGE (ML) INJECTION ONCE
Status: COMPLETED | OUTPATIENT
Start: 2025-06-25 | End: 2025-06-25

## 2025-06-25 RX ORDER — TRAMADOL HYDROCHLORIDE 50 MG/1
50 TABLET ORAL EVERY 6 HOURS PRN
Qty: 30 TABLET | Refills: 0 | Status: SHIPPED | OUTPATIENT
Start: 2025-06-25

## 2025-06-25 RX ORDER — LOVASTATIN 20 MG/1
20 TABLET ORAL EVERY MORNING
Qty: 90 TABLET | Refills: 0 | Status: SHIPPED | OUTPATIENT
Start: 2025-06-25

## 2025-06-25 RX ADMIN — HYDROMORPHONE HYDROCHLORIDE 1 MG: 1 INJECTION, SOLUTION INTRAMUSCULAR; INTRAVENOUS; SUBCUTANEOUS at 15:04

## 2025-06-25 NOTE — TELEPHONE ENCOUNTER
Reason for call:   [x] Refill   [] Prior Auth  [] Other:     Office:   [x] PCP/Provider -   [] Specialty/Provider -     Medication: traMADol (Ultram) 50 mg     Dose/Frequency: Take 1 tablet (50 mg total) by mouth every 6 (six) hours as needed     Quantity: 30    Pharmacy: [facility] [address] [place] [address] - [address] [place] [address] Pharmacy   Does the patient have enough for 3 days?   [] Yes   [x] No - Send as HP to POD    Mail Away Pharmacy   Does the patient have enough for 10 days?   [] Yes   [] No - Send as HP to POD

## 2025-06-25 NOTE — TELEPHONE ENCOUNTER
06/25/25 9:15 AM    Patient contacted post ED visit, [facility] department spoke with patient/caregiver and outreach was successful.    Thank you.  NICOLLE FONTANEZ MA  [facility]

## 2025-06-25 NOTE — ED PROVIDER NOTES
Time reflects when diagnosis was documented in both MDM as applicable and the Disposition within this note       Time User Action Codes Description Comment    6/25/2025  4:04 PM Willian Perry Add [M54.9] Back pain           ED Disposition       ED Disposition   Discharge    Condition   Stable    Date/Time   Wed Jun 25, 2025  4:04 PM    Comment   Juan Jaswinder discharge to home/self care.                   Assessment & Plan       Medical Decision Making  69-year-old male with a history of osteomyelitis/discitis presenting with chronic low back pain.  Will repeat labs, inflammatory markers.  Pain control as needed.    Upon reassessment, patient with significant permanent symptoms.  Discussed all results.  Was able to make appointment with pain management.  Advised to follow-up as scheduled and also with PCP.  Return precautions discussed.    Amount and/or Complexity of Data Reviewed  Labs: ordered.    Risk  Prescription drug management.             Medications   HYDROmorphone (DILAUDID) injection 1 mg (1 mg Intravenous Given 6/25/25 1504)       ED Risk Strat Scores                    (ISAR) Identification of Seniors at Risk  Before the illness or injury that brought you to the Emergency, did you need someone to help you on a regular basis?: 0  In the last 24 hours, have you needed more help than usual?: 1  Have you been hospitalized for one or more nights during the past 6 months?: 1  In general, do you see well?: 0  In general, do you have serious problems with your memory?: 0  Do you take more than three different medications every day?: 1  ISAR Score: 3            SBIRT 20yo+      Flowsheet Row Most Recent Value   Initial Alcohol Screen: US AUDIT-C     1. How often do you have a drink containing alcohol? 0 Filed at: 06/25/2025 1316   2. How many drinks containing alcohol do you have on a typical day you are drinking?  0 Filed at: 06/25/2025 1316   3a. Male UNDER 65: How often do you have five or more drinks on one  occasion? 0 Filed at: 06/25/2025 1316   3b. FEMALE Any Age, or MALE 65+: How often do you have 4 or more drinks on one occassion? 0 Filed at: 06/25/2025 1316   Audit-C Score 0 Filed at: 06/25/2025 1316   JARON: How many times in the past year have you...    Used an illegal drug or used a prescription medication for non-medical reasons? Never Filed at: 06/25/2025 1316                            History of Present Illness       Chief Complaint   Patient presents with    Back Pain     Chronic left lower back pain down into left leg. Pt was evaluated her on the 20th and yesterday for same thing.  Per wife pt needs to be admitted ASAP to controll his pain. Per pt it is unbearable and I cannot take this.        Past Medical History[1]   Past Surgical History[2]   Family History[3]   Social History[4]   E-Cigarette/Vaping    E-Cigarette Use Former User     Comments medical marijuana - occ       E-Cigarette/Vaping Substances    Nicotine No     THC Yes     CBD Yes     Flavoring No     Other No     Unknown No       I have reviewed and agree with the history as documented.     69-year-old male presents for evaluation of left-sided acute on chronic low back pain.  Intermittent radiation down lateral leg.  No evidence of numbness, tingling.  Denies saddle anesthesia or bowel or bladder incontinence.  Able to ambulate without difficulty.  Prescription for oxycodone provided yesterday with improvement of symptoms.  Presents today because pain has returned.        Review of Systems   Constitutional:  Negative for fever.   Musculoskeletal:  Positive for back pain.           Objective       ED Triage Vitals   Temperature Pulse Blood Pressure Respirations SpO2 Patient Position - Orthostatic VS   06/25/25 1317 06/25/25 1317 06/25/25 1317 06/25/25 1317 06/25/25 1317 --   98.1 °F (36.7 °C) 90 117/59 17 99 %       Temp Source Heart Rate Source BP Location FiO2 (%) Pain Score    06/25/25 1317 06/25/25 1317 -- -- 06/25/25 1504    Oral  Monitor   10 - Worst Possible Pain      Vitals      Date and Time Temp Pulse SpO2 Resp BP Pain Score FACES Pain Rating User   06/25/25 1504 -- -- -- -- -- 10 - Worst Possible Pain -- WM   06/25/25 1317 98.1 °F (36.7 °C) 90 99 % 17 117/59 -- -- LK            Physical Exam  Vitals and nursing note reviewed.   Constitutional:       General: He is not in acute distress.     Appearance: He is well-developed.   HENT:      Head: Normocephalic and atraumatic.      Right Ear: External ear normal.      Left Ear: External ear normal.      Nose: Nose normal.     Eyes:      General: No scleral icterus.    Pulmonary:      Effort: Pulmonary effort is normal. No respiratory distress.   Abdominal:      General: There is no distension.      Palpations: Abdomen is soft.     Musculoskeletal:         General: Tenderness present. No deformity. Normal range of motion.      Cervical back: Normal range of motion and neck supple.      Comments: Tender to palpation of left posterior lateral hip.  5/5 strength of bilateral upper and lower extremities.  Normal plantar and dorsiflexion of ankles.  No evidence of saddle anesthesia.  Negative straight leg exam.  No midline CTL spine tenderness     Skin:     General: Skin is warm.      Findings: No rash.     Neurological:      General: No focal deficit present.      Mental Status: He is alert.      Gait: Gait normal.     Psychiatric:         Mood and Affect: Mood normal.         Results Reviewed       Procedure Component Value Units Date/Time    Comprehensive metabolic panel [[id number]]  (Abnormal) Collected: 06/25/25 1504    Lab Status: Final result Specimen: Blood from Arm, Right Updated: 06/25/25 1532     Sodium 136 mmol/L      Potassium 3.9 mmol/L      Chloride 96 mmol/L      CO2 29 mmol/L      ANION GAP 11 mmol/L      BUN 22 mg/dL      Creatinine 1.27 mg/dL      Glucose 192 mg/dL      Calcium 9.8 mg/dL      AST 43 U/L      ALT 22 U/L      Alkaline Phosphatase 107 U/L      Total Protein 8.2  g/dL      Albumin 5.0 g/dL      Total Bilirubin 0.72 mg/dL      eGFR 58 ml/min/1.73sq m     Narrative:      National Kid[employer] Foundation guidelines for Chronic Kidney Disease (CKD):     Stage 1 with normal or high GFR (GFR > 90 mL/min/1.73 square meters)    Stage 2 Mild CKD (GFR = 60-89 mL/min/1.73 square meters)    Stage 3A Moderate CKD (GFR = 45-59 mL/min/1.73 square meters)    Stage 3B Moderate CKD (GFR = 30-44 mL/min/1.73 square meters)    Stage 4 Severe CKD (GFR = 15-29 mL/min/1.73 square meters)    Stage 5 End Stage CKD (GFR <15 mL/min/1.73 square meters)  Note: GFR calculation is accurate only with a steady state creatinine    C-reactive protein [[id number]]  (Normal) Collected: 06/25/25 1504    Lab Status: Final result Specimen: Blood from Arm, Right Updated: 06/25/25 1532     CRP 2.4 mg/L     Sedimentation rate, automated [[id number]]  (Normal) Collected: 06/25/25 1504    Lab Status: Final result Specimen: Blood from Arm, Right Updated: 06/25/25 1522     Sed Rate 11 mm/hour     CBC and differential [[id number]]  (Abnormal) Collected: 06/25/25 1504    Lab Status: Final result Specimen: Blood from Arm, Right Updated: 06/25/25 1512     WBC 12.23 Thousand/uL      RBC 5.64 Million/uL      Hemoglobin 14.8 g/dL      Hematocrit 47.8 %      MCV 85 fL      MCH 26.2 pg      MCHC 31.0 g/dL      RDW 16.9 %      MPV 10.4 fL      Platelets 272 Thousands/uL      nRBC 0 /100 WBCs      Segmented % 77 %      Immature Grans % 1 %      Lymphocytes % 16 %      Monocytes % 6 %      Eosinophils Relative 0 %      Basophils Relative 0 %      Absolute Neutrophils 9.48 Thousands/µL      Absolute Immature Grans 0.06 Thousand/uL      Absolute Lymphocytes 1.93 Thousands/µL      Absolute Monocytes 0.70 Thousand/µL      Eosinophils Absolute 0.02 Thousand/µL      Basophils Absolute 0.04 Thousands/µL             No orders to display       Procedures    ED Medication and Procedure Management   Prior to Admission Medications   Prescriptions  Last Dose Informant Patient Reported? Taking?   ACCU-CHEK FASTCLIX LANCETS MISC  Spouse/Significant Other, Self Yes No   Si (four) times a day   Patient not taking: Reported on 2025   Blood Glucose Monitoring Suppl (OneTouch Verio Reflect) w/Device KIT   No No   Sig: Check blood sugars four times daily. Please substitute with appropriate alternative as covered by patient's insurance. Dx: E11.65   Patient not taking: Reported on 2025   CANNABIDIOL PO  Spouse/Significant Other, Self Yes No   Jardiance 10 MG TABS tablet   No No   Sig: Take 1 tablet (10 mg total) by mouth every morning   Lactobacillus Rhamnosus, GG, (Culturelle) CAPS  Spouse/Significant Other, Self Yes No   Sig: Take 1 capsule by mouth in the morning.   NIFEdipine (ADALAT CC) 90 mg 24 hr tablet  Spouse/Significant Other, Self Yes No   Sig: Take 90 mg by mouth in the morning.   NIFEdipine (PROCARDIA XL) 30 mg 24 hr tablet  Self, Spouse/Significant Other No No   Sig: Take 1 tablet (30 mg total) by mouth daily With nifedipine 90 mg to total 120 mg daily   OneTouch Delica Lancets 33G MISC   No No   Sig: Check blood sugars four times daily. Please substitute with appropriate alternative as covered by patient's insurance. Dx: E11.65   Patient not taking: Reported on 2025   acetaminophen (TYLENOL) 500 mg tablet  Spouse/Significant Other, Self No No   Sig: Take 2 tablets (1,000 mg total) by mouth every 8 (eight) hours 500 mg tablet   busPIRone (BUSPAR) 5 mg tablet   No No   Sig: Take 1 tablet (5 mg total) by mouth as needed in the morning and 1 tablet (5 mg total) as needed in the evening (Anxiety).   clonazePAM (KlonoPIN) 1 mg tablet   No No   Sig: Take 1 tablet (1 mg total) by mouth 2 (two) times a day & 3rd pill PRN for anxiety   cyclobenzaprine (FLEXERIL) 5 mg tablet  Self, Spouse/Significant Other No No   Sig: Take 1 tablet (5 mg total) by mouth 3 (three) times a day as needed for muscle spasms   doxycycline hyclate (VIBRAMYCIN) 100 mg  capsule  Spouse/Significant Other, Self Yes No   Sig: Take 100 mg by mouth in the morning and 100 mg in the evening.   enoxaparin (LOVENOX) 40 mg/0.4 mL   Yes No   Sig: Inject 40 mg under the skin daily   Patient not taking: Reported on 5/9/2025   escitalopram (Lexapro) 20 mg tablet  Spouse/Significant Other, Self Yes No   Sig: Take 20 mg by mouth in the morning.   ferrous sulfate 325 (65 Fe) mg tablet   Yes No   Sig: Take 1 tablet by mouth every other day   finasteride (PROSCAR) 5 mg tablet  Self, Spouse/Significant Other No No   Sig: Take 1 tablet (5 mg total) by mouth daily   folic acid (FOLVITE) 1 mg tablet  Spouse/Significant Other, Self Yes No   Sig: Take 2,000 mcg by mouth in the morning.   furosemide (LASIX) 20 mg tablet   No No   Sig: Take 1 tablet (20 mg total) by mouth daily take 1 tab by mouth daily as needed for weight gain more than 3lbs overnight or more than 5lb in a week, or LE swelling.   gabapentin (NEURONTIN) 400 mg capsule  Self, Spouse/Significant Other No No   Sig: Take 1 capsule by mouth twice daily   glucose blood (OneTouch Verio) test strip   No No   Sig: Check blood sugars four times daily. Please substitute with appropriate alternative as covered by patient's insurance. Dx: E11.65   Patient not taking: Reported on 5/9/2025   guaiFENesin (ROBITUSSIN) 100 MG/5ML oral liquid   No No   Sig: Take 10 mL (200 mg total) by mouth 3 (three) times a day as needed for cough   hydrOXYzine pamoate (VISTARIL) 50 mg capsule  Self, Spouse/Significant Other No No   Sig: Take 1 capsule (50 mg total) by mouth 2 (two) times a day   ketoconazole (NIZORAL) 2 % shampoo  Self, Spouse/Significant Other No No   Sig: Apply 1 Application topically 2 (two) times a week Use as directed   lidocaine (LIDODERM) 5 %  Spouse/Significant Other, Self No No   Sig: Apply 1 patch topically over 12 hours daily Remove & Discard patch within 12 hours or as directed by MD   Patient not taking: Reported on 5/9/2025    losartan-hydrochlorothiazide (HYZAAR) 100-25 MG per tablet   No No   Sig: Take 1 tablet by mouth in the morning   lovastatin (MEVACOR) 20 mg tablet   No No   Sig: TAKE 1 TABLET BY MOUTH ONCE DAILY IN THE MORNING   metFORMIN (GLUCOPHAGE) 1000 MG tablet   No No   Sig: Take 1 tablet (1,000 mg total) by mouth daily with breakfast   metoclopramide (REGLAN) 10 mg tablet  Spouse/Significant Other, Self Yes No   Sig: Take 10 mg by mouth in the morning and 10 mg in the evening. Take before meals.   metoprolol succinate (TOPROL-XL) 50 mg 24 hr tablet  Spouse/Significant Other, Self Yes No   Sig: Take 50 mg by mouth in the morning and 50 mg before bedtime.   mirtazapine (REMERON) 45 MG tablet  Self, Spouse/Significant Other No No   Sig: TAKE 1 TABLET BY MOUTH ONCE DAILY AT BEDTIME   naloxone (NARCAN) 4 mg/0.1 mL nasal spray   No No   Si.1 mL (4 mg total) into each nostril every 3 (three) minutes as needed for opioid reversal or respiratory depression   Patient not taking: Reported on 2025   ondansetron (ZOFRAN-ODT) 4 mg disintegrating tablet   No No   Sig: Take 1 tablet (4 mg total) by mouth every 8 (eight) hours as needed for nausea or vomiting for up to 5 days   ondansetron (Zofran ODT) 4 mg disintegrating tablet  Spouse/Significant Other, Self No No   Sig: Take 1 tablet (4 mg total) by mouth every 6 (six) hours as needed for nausea or vomiting   oxyCODONE-acetaminophen (Percocet) 5-325 mg per tablet   No No   Sig: Take 1 tablet by mouth every 8 (eight) hours as needed for moderate pain for up to 10 days Max Daily Amount: 3 tablets   pantoprazole (PROTONIX) 40 mg tablet   No No   Sig: Take 1 tablet (40 mg total) by mouth in the morning and 1 tablet (40 mg total) before bedtime.   polyethylene glycol (MIRALAX) 17 g packet  Spouse/Significant Other, Self Yes No   Sig: Take 17 g by mouth   Patient not taking: Reported on 2025   senna (SENOKOT) 8.6 mg  Spouse/Significant Other, Self No No   Sig: Take 1 tablet (8.6  mg total) by mouth 2 (two) times a day 2 tabs at bedtime as needed for constipation per latest dci   tamsulosin (FLOMAX) 0.4 mg  Spouse/Significant Other, Self No No   Sig: TAKE 1 CAPSULE BY MOUTH DAILY WITH DINNER   traMADol (Ultram) 50 mg tablet   No No   Sig: Take 1 tablet (50 mg total) by mouth every 6 (six) hours as needed for moderate pain   triamcinolone (KENALOG) 0.1 % ointment  Self, Spouse/Significant Other No No   Sig: Apply topically 2 (two) times a day   Patient not taking: Reported on 5/9/2025      Facility-Administered Medications: None     Discharge Medication List as of 6/25/2025  4:06 PM        START taking these medications    Details   lovastatin (MEVACOR) 20 mg tablet TAKE 1 TABLET BY MOUTH ONCE DAILY IN THE MORNING, Starting Wed 6/25/2025, Normal      traMADol (Ultram) 50 mg tablet Take 1 tablet (50 mg total) by mouth every 6 (six) hours as needed for moderate pain, Starting Wed 6/25/2025, Normal           CONTINUE these medications which have NOT CHANGED    Details   !! ACCU-CHEK FASTCLIX LANCETS MISC 4 (four) times a day, Starting Sat 12/22/2018, Historical Med      acetaminophen (TYLENOL) 500 mg tablet Take 2 tablets (1,000 mg total) by mouth every 8 (eight) hours 500 mg tablet, Starting Tue 5/21/2024, No Print      Blood Glucose Monitoring Suppl (OneTouch Verio Reflect) w/Device KIT Check blood sugars four times daily. Please substitute with appropriate alternative as covered by patient's insurance. Dx: E11.65, Normal      busPIRone (BUSPAR) 5 mg tablet Take 1 tablet (5 mg total) by mouth as needed in the morning and 1 tablet (5 mg total) as needed in the evening (Anxiety)., Starting Wed 6/4/2025, Normal      CANNABIDIOL PO Historical Med      clonazePAM (KlonoPIN) 1 mg tablet Take 1 tablet (1 mg total) by mouth 2 (two) times a day & 3rd pill PRN for anxiety, Starting Tue 6/17/2025, Normal      cyclobenzaprine (FLEXERIL) 5 mg tablet Take 1 tablet (5 mg total) by mouth 3 (three) times a day  as needed for muscle spasms, Starting Mon 1/20/2025, Normal      doxycycline hyclate (VIBRAMYCIN) 100 mg capsule Take 100 mg by mouth in the morning and 100 mg in the evening., Starting Sat 8/10/2024, Historical Med      enoxaparin (LOVENOX) 40 mg/0.4 mL Inject 40 mg under the skin daily, Starting Wed 3/26/2025, Historical Med      escitalopram (Lexapro) 20 mg tablet Take 20 mg by mouth in the morning., Starting Wed 5/29/2024, Historical Med      ferrous sulfate 325 (65 Fe) mg tablet Take 1 tablet by mouth every other day, Starting Wed 3/26/2025, Historical Med      finasteride (PROSCAR) 5 mg tablet Take 1 tablet (5 mg total) by mouth daily, Starting Tue 2/4/2025, Normal      folic acid (FOLVITE) 1 mg tablet Take 2,000 mcg by mouth in the morning., Starting Mon 12/17/2018, Historical Med      furosemide (LASIX) 20 mg tablet Take 1 tablet (20 mg total) by mouth daily take 1 tab by mouth daily as needed for weight gain more than 3lbs overnight or more than 5lb in a week, or LE swelling., Starting Sun 5/11/2025, Normal      gabapentin (NEURONTIN) 400 mg capsule Take 1 capsule by mouth twice daily, Starting Tue 2/11/2025, Normal      glucose blood (OneTouch Verio) test strip Check blood sugars four times daily. Please substitute with appropriate alternative as covered by patient's insurance. Dx: E11.65, Normal      guaiFENesin (ROBITUSSIN) 100 MG/5ML oral liquid Take 10 mL (200 mg total) by mouth 3 (three) times a day as needed for cough, Starting Fri 5/16/2025, Normal      hydrOXYzine pamoate (VISTARIL) 50 mg capsule Take 1 capsule (50 mg total) by mouth 2 (two) times a day, Starting Mon 2/10/2025, Normal      Jardiance 10 MG TABS tablet Take 1 tablet (10 mg total) by mouth every morning, Starting Wed 4/30/2025, No Print      ketoconazole (NIZORAL) 2 % shampoo Apply 1 Application topically 2 (two) times a week Use as directed, Starting Thu 1/16/2025, Normal      Lactobacillus Rhamnosus, GG, (Culturelle) CAPS Take 1  capsule by mouth in the morning., Starting Tue 7/30/2024, Historical Med      lidocaine (LIDODERM) 5 % Apply 1 patch topically over 12 hours daily Remove & Discard patch within 12 hours or as directed by MD, Starting Tue 2/27/2024, Normal      losartan-hydrochlorothiazide (HYZAAR) 100-25 MG per tablet Take 1 tablet by mouth in the morning, Starting Wed 4/30/2025, Normal      metFORMIN (GLUCOPHAGE) 1000 MG tablet Take 1 tablet (1,000 mg total) by mouth daily with breakfast, Starting Wed 4/30/2025, Normal      metoclopramide (REGLAN) 10 mg tablet Take 10 mg by mouth in the morning and 10 mg in the evening. Take before meals., Starting Sat 11/16/2024, Historical Med      metoprolol succinate (TOPROL-XL) 50 mg 24 hr tablet Take 50 mg by mouth in the morning and 50 mg before bedtime., Starting Wed 4/3/2024, Historical Med      mirtazapine (REMERON) 45 MG tablet TAKE 1 TABLET BY MOUTH ONCE DAILY AT BEDTIME, Starting Thu 2/27/2025, Normal      naloxone (NARCAN) 4 mg/0.1 mL nasal spray 0.1 mL (4 mg total) into each nostril every 3 (three) minutes as needed for opioid reversal or respiratory depression, Starting Tue 4/29/2025, Normal      !! NIFEdipine (ADALAT CC) 90 mg 24 hr tablet Take 90 mg by mouth in the morning., Starting Wed 11/20/2024, Historical Med      !! NIFEdipine (PROCARDIA XL) 30 mg 24 hr tablet Take 1 tablet (30 mg total) by mouth daily With nifedipine 90 mg to total 120 mg daily, Starting Wed 1/22/2025, Until Mon 7/21/2025, Normal      !! ondansetron (Zofran ODT) 4 mg disintegrating tablet Take 1 tablet (4 mg total) by mouth every 6 (six) hours as needed for nausea or vomiting, Starting Tue 5/21/2024, Normal      !! ondansetron (ZOFRAN-ODT) 4 mg disintegrating tablet Take 1 tablet (4 mg total) by mouth every 8 (eight) hours as needed for nausea or vomiting for up to 5 days, Starting Sun 6/22/2025, Until Fri 6/27/2025 at 2359, Normal      !! OneTouch Delica Lancets 33G MISC Check blood sugars four times  daily. Please substitute with appropriate alternative as covered by patient's insurance. Dx: E11.65, Normal      oxyCODONE-acetaminophen (Percocet) 5-325 mg per tablet Take 1 tablet by mouth every 8 (eight) hours as needed for moderate pain for up to 10 days Max Daily Amount: 3 tablets, Starting Sun 6/22/2025, Until Wed 7/2/2025 at 2359, Normal      pantoprazole (PROTONIX) 40 mg tablet Take 1 tablet (40 mg total) by mouth in the morning and 1 tablet (40 mg total) before bedtime., Starting Wed 6/4/2025, Normal      polyethylene glycol (MIRALAX) 17 g packet Take 17 g by mouth, Starting Tue 6/18/2024, Historical Med      senna (SENOKOT) 8.6 mg Take 1 tablet (8.6 mg total) by mouth 2 (two) times a day 2 tabs at bedtime as needed for constipation per latest dci, Starting Tue 5/21/2024, No Print      tamsulosin (FLOMAX) 0.4 mg TAKE 1 CAPSULE BY MOUTH DAILY WITH DINNER, Starting Tue 8/13/2024, Normal      triamcinolone (KENALOG) 0.1 % ointment Apply topically 2 (two) times a day, Starting Tue 12/17/2024, Normal       !! - Potential duplicate medications found. Please discuss with provider.        No discharge procedures on file.  ED SEPSIS DOCUMENTATION   Time reflects when diagnosis was documented in both MDM as applicable and the Disposition within this note       Time User Action Codes Description Comment    6/25/2025  4:04 PM Willian Perry Add [M54.9] Back pain                    [1]   Past Medical History:  Diagnosis Date    Allergic     Anemia     Anxiety     Arthritis     Cancer (HCC)     CHF (congestive heart failure) (HCC)     Chronic back pain     Depression     Diabetes mellitus (HCC)     Hypertension     Liver disease     Low back pain     Extreme Pain    Mitral valve prolapse     Peripheral neuropathy     Neuropathy    PONV (postoperative nausea and vomiting)     Thyroid disease    [2]   Past Surgical History:  Procedure Laterality Date    COLONOSCOPY      INCISION AND DRAINAGE OF WOUND Left 08/12/2022     Procedure: INCISION AND DRAINAGE (I&D) EXTREMITY;  Surgeon: Moustapha Cote DPM;  Location: [facility];  Service: Podiatry    IR BIOPSY SPINE  2024    IR BIOPSY SPINE  2024    IR PICC PLACEMENT SINGLE LUMEN  2024    JOINT REPLACEMENT Left 2022    Left TSA    OK ARTHRODESIS POSTERIOR/PSTLAT TQ 1NTRSPC LUMBAR Bilateral 2023    Procedure: L1-S1 navigated posterior decompression with instrumented fixation fusion;  Surgeon: James Moraes MD;  Location: [facility];  Service: Neurosurgery    THYROID SURGERY      remove cancer   [3]   Family History  Problem Relation Name Age of Onset    Hypertension Father Perry San     Diabetes type II Father Perry Jaswinder     Neuropathy Father Perry Jaswinder     Dementia Mother Susan Jaswinder     Diabetes Mother Susanlazaro San     Diabetes type II Mother Susanlazaro San     Neuropathy Mother Susan San     Colon cancer Neg Hx     [4]   Social History  Tobacco Use    Smoking status: Former     Current packs/day: 0.00     Average packs/day: 0.3 packs/day for 5.9 years (1.5 ttl pk-yrs)     Types: Cigarettes     Start date: 1975     Quit date: 1981     Years since quittin.0    Smokeless tobacco: Never    Tobacco comments:     quit ; smokes when in pain as of 26   Vaping Use    Vaping status: Former    Substances: THC, CBD   Substance Use Topics    Alcohol use: Not Currently     Alcohol/week: 4.0 - 7.0 standard drinks of alcohol     Types: 1 - 2 Glasses of wine, 2 - 3 Cans of beer, 1 - 2 Shots of liquor per week     Comment: only on special occasions    Drug use: Not Currently     Frequency: 7.0 times per week     Types: Marijuana     Comment: Medical Marijuana        Willian Perry DO  27

## 2025-06-26 ENCOUNTER — VBI (OUTPATIENT)
Dept: FAMILY MEDICINE CLINIC | Facility: CLINIC | Age: 70
End: 2025-06-26

## 2025-06-26 ENCOUNTER — PATIENT OUTREACH (OUTPATIENT)
Dept: CASE MANAGEMENT | Facility: OTHER | Age: 70
End: 2025-06-26

## 2025-06-26 DIAGNOSIS — R33.9 URINARY RETENTION: ICD-10-CM

## 2025-06-26 RX ORDER — FINASTERIDE 5 MG/1
5 TABLET, FILM COATED ORAL DAILY
Qty: 100 TABLET | Refills: 1 | Status: SHIPPED | OUTPATIENT
Start: 2025-06-26

## 2025-06-26 RX ORDER — METHYLPREDNISOLONE 4 MG/1
4 TABLET ORAL SEE ADMIN INSTRUCTIONS
Qty: 21 TABLET | Refills: 0 | Status: SHIPPED | OUTPATIENT
Start: 2025-06-26

## 2025-06-26 NOTE — TELEPHONE ENCOUNTER
06/26/25 2:52 PM    Patient contacted post ED visit, first outreach attempt made. Message was left for patient to return a call to the [facility] Department at [facility]: Phone [phone].    Thank you.  [facility] Mark MA  [facility]

## 2025-06-26 NOTE — ED PROVIDER NOTES
6-26-25 930 am    Wife called asking for steroid prescription that has helped in the past - I put script for medrol dose pack.  Patient has apt with specialist coming up soon         Osmar Roque, DO  06/26/25 1206

## 2025-06-26 NOTE — LETTER
Date: 06/26/25    Dear Juan San,   My name is Diana; I am a registered nurse care manager working with [facility].    I have not been able to reach you and would like to set a time that I can talk with you over the phone.  My work is to help patients that have complex medical conditions get the care they need. This includes patients who may have been in the hospital or emergency room.     Please call me at [phone] with any questions you may have. I look forward to hearing from you.  Sincerely,  Diana Jimenez RN  Outpatient Care Manager

## 2025-06-26 NOTE — PROGRESS NOTES
Second attempt to contact patient for f/u post ED visits for back pain.  Message left on vm.  Unable to reach letter sent.  Will close referral at this time.

## 2025-06-26 NOTE — LETTER
[facility]  [address]  [place] [address] [phone]    Date: 06/30/25    Juan San  [address]  [place] [address]    Dear Juan:                                                                                                                                Thank you for choosing [facility] emergency department for care.  Your primary care provider wants to make sure that your ongoing medical care is being addressed. If you require follow up care as a result of your emergency department visit, there are a few things the practice would like you to know.                As part of the network's continuing commitment to caring for our patients, we have added more same day appointments and have extended office hours to meet your medical needs. After hours, on-call physicians are available via your primary care provider's main office line.               We encourage you to contact our office prior to seeking treatment to discuss your symptoms with the medical staff.  Together, we can determine the correct course of action.  A majority of non-emergent conditions such as: common cold, flu-like symptoms, fevers, strains/sprains, dislocations, minor burns, cuts and animal bites can be treated at [facility] facilities. Diagnostic testing is available at some sites.               Of course, if you are experiencing a life threatening medical emergency call 911 or proceed directly to the nearest emergency room.    Your nearest [facility] facility is conveniently located at:    [facility]  [address] [place] [address]  [phone]  SKIP THE WAIT  Conveniently offered at most [facility] locations  [place] your spot online at [email or web address]/locations or on the [facility]    Sincerely,    [facility]  Dept: [phone]

## 2025-06-27 NOTE — TELEPHONE ENCOUNTER
06/27/25 9:52 AM    Patient contacted post ED visit, second outreach attempt made. Message was left for patient to return a call to the [facility] Department at Bi: Phone [phone].    Thank you.  Bi Mark MA  [facility]

## 2025-06-28 LAB
BACTERIA BLD CULT: NORMAL
BACTERIA BLD CULT: NORMAL

## 2025-06-30 NOTE — TELEPHONE ENCOUNTER
06/30/25 11:52 AM    Patient contacted post ED visit, phone outreaches were unsuccessful and a MyChart letter has been sent to the patient as follow-up.    Thank you.  Bi Mark MA  [facility]

## 2025-07-08 DIAGNOSIS — Z98.1 STATUS POST LUMBAR SPINAL FUSION: ICD-10-CM

## 2025-07-08 DIAGNOSIS — M54.16 LUMBAR RADICULOPATHY: ICD-10-CM

## 2025-07-08 RX ORDER — TRAMADOL HYDROCHLORIDE 50 MG/1
50 TABLET ORAL EVERY 6 HOURS PRN
Qty: 30 TABLET | Refills: 0 | Status: SHIPPED | OUTPATIENT
Start: 2025-07-08 | End: 2025-07-15 | Stop reason: SDUPTHER

## 2025-07-08 NOTE — TELEPHONE ENCOUNTER
Reason for call: Patient has upcoming appointment with Pain Management 7/15/25. Patient out of medication today      [x] Refill   [] Prior Auth  [] Other:     Office:   [x] PCP/Provider - RUIZ Barragan   [] Specialty/Provider -      Medication: traMADol (Ultram)    Dose/Frequency: 50mg            Every 6 hours PRN     Quantity: 30    Pharmacy: [id number]   Does the patient have enough for 3 days?   [] Yes   [x] No - Send as HP to POD    Mail Away Pharmacy   Does the patient have enough for 10 days?   [] Yes   [] No - Send as HP to POD

## 2025-07-15 ENCOUNTER — OFFICE VISIT (OUTPATIENT)
Dept: PAIN MEDICINE | Facility: CLINIC | Age: 70
End: 2025-07-15
Payer: COMMERCIAL

## 2025-07-15 VITALS — HEART RATE: 93 BPM | HEIGHT: 68 IN | BODY MASS INDEX: 27.89 KG/M2 | WEIGHT: 184 LBS | TEMPERATURE: 97.6 F

## 2025-07-15 DIAGNOSIS — Z98.1 STATUS POST LUMBAR SPINAL FUSION: ICD-10-CM

## 2025-07-15 DIAGNOSIS — T84.9XXD FAILURE OF JOINT FUSION, SUBSEQUENT ENCOUNTER: ICD-10-CM

## 2025-07-15 DIAGNOSIS — E11.42 DIABETIC POLYNEUROPATHY ASSOCIATED WITH TYPE 2 DIABETES MELLITUS (HCC): Primary | ICD-10-CM

## 2025-07-15 DIAGNOSIS — Z98.1 HISTORY OF LUMBAR FUSION: ICD-10-CM

## 2025-07-15 DIAGNOSIS — M54.16 LUMBAR RADICULOPATHY: ICD-10-CM

## 2025-07-15 PROCEDURE — 99214 OFFICE O/P EST MOD 30 MIN: CPT | Performed by: ANESTHESIOLOGY

## 2025-07-15 RX ORDER — TRAMADOL HYDROCHLORIDE 50 MG/1
50 TABLET ORAL EVERY 6 HOURS PRN
Qty: 30 TABLET | Refills: 0 | Status: SHIPPED | OUTPATIENT
Start: 2025-07-15 | End: 2025-07-22 | Stop reason: SDUPTHER

## 2025-07-21 DIAGNOSIS — I10 PRIMARY HYPERTENSION: ICD-10-CM

## 2025-07-22 DIAGNOSIS — F32.A ANXIETY AND DEPRESSION: ICD-10-CM

## 2025-07-22 DIAGNOSIS — I10 PRIMARY HYPERTENSION: Primary | ICD-10-CM

## 2025-07-22 DIAGNOSIS — F41.9 ANXIETY AND DEPRESSION: ICD-10-CM

## 2025-07-22 DIAGNOSIS — Z98.1 STATUS POST LUMBAR SPINAL FUSION: ICD-10-CM

## 2025-07-22 DIAGNOSIS — M54.16 LUMBAR RADICULOPATHY: ICD-10-CM

## 2025-07-22 RX ORDER — ESCITALOPRAM OXALATE 20 MG/1
20 TABLET ORAL DAILY
Qty: 90 TABLET | Refills: 3 | Status: SHIPPED | OUTPATIENT
Start: 2025-07-22

## 2025-07-22 RX ORDER — TRAMADOL HYDROCHLORIDE 50 MG/1
50 TABLET ORAL EVERY 6 HOURS PRN
Qty: 30 TABLET | Refills: 0 | Status: SHIPPED | OUTPATIENT
Start: 2025-07-22

## 2025-07-22 RX ORDER — NIFEDIPINE 90 MG/1
90 TABLET, FILM COATED, EXTENDED RELEASE ORAL DAILY
Qty: 90 TABLET | Refills: 1 | Status: SHIPPED | OUTPATIENT
Start: 2025-07-22

## 2025-07-22 RX ORDER — CLONAZEPAM 1 MG/1
1 TABLET ORAL 2 TIMES DAILY
Qty: 60 TABLET | Refills: 0 | Status: SHIPPED | OUTPATIENT
Start: 2025-07-22

## 2025-07-22 NOTE — TELEPHONE ENCOUNTER
Reason for call:   [x] Refill   [] Prior Auth  [] Other:     Office:   [x] PCP/Provider -   [] Specialty/Provider -     Medication: NIFEdipine (ADALAT CC) 90 mg 24 hr tablet Take 90 mg by mouth in the morning     escitalopram (Lexapro) 20 mg tablet Take 20 mg by mouth in the morning     clonazePAM (KlonoPIN) 1 mg tablet Take 1 tablet (1 mg total) by mouth 2 (two) times a day & 3rd pill PRN for anxiety     traMADol (Ultram) 50 mg tablet Take 1 tablet (50 mg total) by mouth every 6 (six) hours as needed for moderate pain       Pharmacy: [facility] [address] [place] [address] - [address] [place] [address] Pharmacy   Does the patient have enough for 3 days?   [] Yes   [x] No - Send as HP to POD    Mail Away Pharmacy   Does the patient have enough for 10 days?   [] Yes   [] No - Send as HP to POD

## 2025-07-23 RX ORDER — NIFEDIPINE 30 MG/1
TABLET, EXTENDED RELEASE ORAL
Qty: 90 TABLET | Refills: 1 | Status: SHIPPED | OUTPATIENT
Start: 2025-07-23

## 2025-07-29 ENCOUNTER — HOSPITAL ENCOUNTER (OUTPATIENT)
Age: 70
Discharge: HOME | End: 2025-07-29
Attending: NEUROLOGICAL SURGERY
Payer: COMMERCIAL

## 2025-07-29 DIAGNOSIS — S32.009K PSEUDOARTHROSIS OF LUMBAR SPINE: ICD-10-CM

## 2025-07-29 PROCEDURE — 72148 MRI LUMBAR SPINE W/O DYE: CPT

## 2025-08-02 DIAGNOSIS — M54.16 LUMBAR RADICULOPATHY: ICD-10-CM

## 2025-08-02 DIAGNOSIS — E11.42 DIABETIC PERIPHERAL NEUROPATHY (HCC): ICD-10-CM

## 2025-08-04 RX ORDER — GABAPENTIN 400 MG/1
400 CAPSULE ORAL 2 TIMES DAILY
Qty: 60 CAPSULE | Refills: 5 | Status: SHIPPED | OUTPATIENT
Start: 2025-08-04

## 2025-08-05 DIAGNOSIS — M79.89 LEFT LEG SWELLING: ICD-10-CM

## 2025-08-06 RX ORDER — FUROSEMIDE 20 MG/1
20 TABLET ORAL DAILY
Qty: 30 TABLET | Refills: 5 | Status: SHIPPED | OUTPATIENT
Start: 2025-08-06

## 2025-08-07 RX ORDER — FUROSEMIDE 20 MG/1
20 TABLET ORAL DAILY
Qty: 30 TABLET | Refills: 0 | OUTPATIENT
Start: 2025-08-07

## 2025-08-09 ENCOUNTER — HOSPITAL ENCOUNTER (EMERGENCY)
Facility: HOSPITAL | Age: 70
Discharge: HOME | End: 2025-08-09
Attending: EMERGENCY MEDICINE | Admitting: EMERGENCY MEDICINE
Payer: COMMERCIAL

## 2025-08-11 ENCOUNTER — VBI (OUTPATIENT)
Dept: FAMILY MEDICINE CLINIC | Facility: CLINIC | Age: 70
End: 2025-08-11

## 2025-08-17 DIAGNOSIS — F31.81 BIPOLAR II DISORDER (HCC): ICD-10-CM

## 2025-08-19 RX ORDER — MIRTAZAPINE 45 MG/1
45 TABLET, FILM COATED ORAL
Qty: 90 TABLET | Refills: 1 | Status: ON HOLD | OUTPATIENT
Start: 2025-08-19

## 2025-08-22 PROBLEM — S32.009K PSEUDOARTHROSIS OF LUMBAR SPINE: Status: ACTIVE | Noted: 2025-08-22

## 2025-08-22 PROBLEM — Z78.9 DEFICIT IN ACTIVITIES OF DAILY LIVING (ADL): Status: ACTIVE | Noted: 2025-08-22

## 2025-08-22 PROBLEM — M48.07 FORAMINAL STENOSIS OF LUMBOSACRAL REGION: Status: ACTIVE | Noted: 2025-08-22

## 2025-08-22 PROBLEM — D62 ACUTE BLOOD LOSS AS CAUSE OF POSTOPERATIVE ANEMIA: Status: ACTIVE | Noted: 2025-08-22

## 2025-08-26 PROBLEM — R63.0 DECREASED APPETITE: Status: ACTIVE | Noted: 2025-08-26

## (undated) DEVICE — JACKSON TABLE FOAM POSITIONING KIT: Brand: CARDINAL HEALTH

## (undated) DEVICE — ELECTRODE BLADE MOD E-Z CLEAN 4IN -0014AM

## (undated) DEVICE — DRAIN SPONGES,6 PLY: Brand: EXCILON

## (undated) DEVICE — STRL PENROSE DRAIN 18" X 1/4": Brand: CARDINAL HEALTH

## (undated) DEVICE — SPONGE 4 X 4 XRAY 16 PLY STRL LF RFD

## (undated) DEVICE — INTENDED FOR TISSUE SEPARATION, AND OTHER PROCEDURES THAT REQUIRE A SHARP SURGICAL BLADE TO PUNCTURE OR CUT.: Brand: BARD-PARKER SAFETY BLADES SIZE 15, STERILE

## (undated) DEVICE — BETHLEHEM UNIVERSAL SPINE, KIT: Brand: CARDINAL HEALTH

## (undated) DEVICE — DRAPE SHEET THREE QUARTER

## (undated) DEVICE — ACE WRAP 4 IN UNSTERILE

## (undated) DEVICE — HEMOSTATIC MATRIX SURGIFLO 8ML W/THROMBIN

## (undated) DEVICE — INTENDED FOR TISSUE SEPARATION, AND OTHER PROCEDURES THAT REQUIRE A SHARP SURGICAL BLADE TO PUNCTURE OR CUT.: Brand: BARD-PARKER ® CARBON RIB-BACK BLADES

## (undated) DEVICE — PROXIMATE SKIN STAPLERS (35 WIDE) CONTAINS 35 STAINLESS STEEL STAPLES (FIXED HEAD): Brand: PROXIMATE

## (undated) DEVICE — CAST PADDING 3 IN UNSTERILE

## (undated) DEVICE — ASTOUND STANDARD SURGICAL GOWN, XXL: Brand: CONVERTORS

## (undated) DEVICE — ANTIBACTERIAL VIOLET BRAIDED (POLYGLACTIN 910), SYNTHETIC ABSORBABLE SUTURE: Brand: COATED VICRYL

## (undated) DEVICE — NEURO PATTIES 1/2 X 1 1/2

## (undated) DEVICE — DRESSING, ALGINATE, MAXORB II, 4"X4": Brand: MEDLINE INDUSTRIES, INC.

## (undated) DEVICE — DRESSING MEPILEX AG BORDER 4 X 8 IN

## (undated) DEVICE — BIPOLAR SEALER 23-113-1 AQM 2.3: Brand: AQUAMANTYS™

## (undated) DEVICE — JACKSON-PRATT 100CC BULB RESERVOIR: Brand: CARDINAL HEALTH

## (undated) DEVICE — IODOFORM PACKING STRIP: Brand: CURITY

## (undated) DEVICE — PREP SURGICAL PURPREP 26ML

## (undated) DEVICE — CURITY NON-ADHERENT STRIPS: Brand: CURITY

## (undated) DEVICE — GLOVE SRG BIOGEL 9

## (undated) DEVICE — INTENDED FOR TISSUE SEPARATION, AND OTHER PROCEDURES THAT REQUIRE A SHARP SURGICAL BLADE TO PUNCTURE OR CUT.: Brand: BARD-PARKER SAFETY BLADES SIZE 10, STERILE

## (undated) DEVICE — JP PERF DRN SIL FLT 7MM FULL: Brand: CARDINAL HEALTH

## (undated) DEVICE — DRAPE SURGIKIT SADDLE BAG

## (undated) DEVICE — SPONGE PVP SCRUB WING STERILE

## (undated) DEVICE — GLOVE INDICATOR PI UNDERGLOVE SZ 8 BLUE

## (undated) DEVICE — LIGHT HANDLE COVER SLEEVE DISP BLUE STELLAR

## (undated) DEVICE — CURITY IDOFORM PACKING STRIP: Brand: CURITY

## (undated) DEVICE — NEPTUNE E-SEP SMOKE EVACUATION PENCIL, COATED, 70MM BLADE, PUSH BUTTON SWITCH: Brand: NEPTUNE E-SEP

## (undated) DEVICE — THE SIMPULSE SOLO SYSTEM WITH ULTREX RETRACTABLE SPLASH SHIELD TIP: Brand: SIMPULSE SOLO

## (undated) DEVICE — BOWL ASSY BM210 DUAL BLADE DISPOSABLE: Brand: MIDAS REX™

## (undated) DEVICE — PENCIL ELECTROSURG E-Z CLEAN -0035H

## (undated) DEVICE — BETHLEHEM UNIVERSAL  MIONR EXT: Brand: CARDINAL HEALTH

## (undated) DEVICE — DRESSING MEPILEX AG BORDER POST-OP 4 X 12 IN

## (undated) DEVICE — TOOL 14MH30 LEGEND 14CM 3MM: Brand: MIDAS REX ™

## (undated) DEVICE — MARKER REFLECTIVE RADIOPAQUE SPHERE

## (undated) DEVICE — SUT ETHILON 3-0 PS-1 18 IN 1663H

## (undated) DEVICE — DRAPE LAPAROTOMY W/POUCHES

## (undated) DEVICE — TRAY FOLEY 16FR URIMETER SURESTEP

## (undated) DEVICE — GLOVE SRG BIOGEL 8

## (undated) DEVICE — TOOL 15BA50 LEGEND 15CM 5MM BA: Brand: MIDAS REX™

## (undated) DEVICE — 3M™ TEGADERM™ TRANSPARENT FILM DRESSING FRAME STYLE, 1626W, 4 IN X 4-3/4 IN (10 CM X 12 CM), 50/CT 4CT/CASE: Brand: 3M™ TEGADERM™

## (undated) DEVICE — WET SKIN PREP TRAY: Brand: MEDLINE INDUSTRIES, INC.